# Patient Record
Sex: FEMALE | Race: WHITE | NOT HISPANIC OR LATINO | Employment: OTHER | URBAN - METROPOLITAN AREA
[De-identification: names, ages, dates, MRNs, and addresses within clinical notes are randomized per-mention and may not be internally consistent; named-entity substitution may affect disease eponyms.]

---

## 2023-04-07 LAB
BLOOD CULTURE: NORMAL
BLOOD CULTURE: NORMAL

## 2023-06-29 PROCEDURE — 99233 SBSQ HOSP IP/OBS HIGH 50: CPT | Performed by: INTERNAL MEDICINE

## 2023-06-30 PROCEDURE — 99233 SBSQ HOSP IP/OBS HIGH 50: CPT | Performed by: INTERNAL MEDICINE

## 2023-07-30 LAB
BLOOD CULTURE: NORMAL
BLOOD CULTURE: NORMAL

## 2023-09-30 ENCOUNTER — PHARMACY VISIT (OUTPATIENT)
Dept: PHARMACY | Facility: CLINIC | Age: 48
End: 2023-09-30

## 2023-11-09 PROBLEM — I25.10 CORONARY ARTERY DISEASE: Status: ACTIVE | Noted: 2023-11-09

## 2023-11-09 PROBLEM — J90 PLEURAL EFFUSION: Status: ACTIVE | Noted: 2023-11-09

## 2023-11-09 PROBLEM — F41.8 DEPRESSION WITH ANXIETY: Status: ACTIVE | Noted: 2023-11-09

## 2023-11-09 PROBLEM — R56.9 SEIZURE (MULTI): Status: ACTIVE | Noted: 2023-11-09

## 2023-11-09 PROBLEM — I72.9 PSEUDOANEURYSM (CMS-HCC): Status: ACTIVE | Noted: 2023-11-09

## 2023-11-09 PROBLEM — N28.9 ABNORMAL RENAL FUNCTION: Status: ACTIVE | Noted: 2023-11-09

## 2023-11-09 PROBLEM — I10 BENIGN ESSENTIAL HYPERTENSION: Status: ACTIVE | Noted: 2023-11-09

## 2023-11-09 PROBLEM — I38 ENDOCARDITIS: Status: ACTIVE | Noted: 2023-11-09

## 2023-11-09 PROBLEM — N18.6 END-STAGE RENAL DISEASE ON HEMODIALYSIS (MULTI): Status: ACTIVE | Noted: 2023-11-09

## 2023-11-09 PROBLEM — A41.9 SEPSIS (MULTI): Status: ACTIVE | Noted: 2023-11-09

## 2023-11-09 PROBLEM — K92.1 MELENA: Status: ACTIVE | Noted: 2023-11-09

## 2023-11-09 PROBLEM — D63.1 ANEMIA SECONDARY TO RENAL FAILURE: Status: ACTIVE | Noted: 2023-11-09

## 2023-11-09 PROBLEM — D63.8 ANEMIA OF CHRONIC DISEASE: Status: ACTIVE | Noted: 2023-11-09

## 2023-11-09 PROBLEM — I95.9 LOW BLOOD PRESSURE: Status: ACTIVE | Noted: 2023-11-09

## 2023-11-09 PROBLEM — E87.1 HYPONATREMIA: Status: ACTIVE | Noted: 2023-11-09

## 2023-11-09 PROBLEM — F41.9 ANXIETY: Status: ACTIVE | Noted: 2023-11-09

## 2023-11-09 PROBLEM — I10 HYPERTENSION: Status: ACTIVE | Noted: 2023-11-09

## 2023-11-09 PROBLEM — N18.9 ANEMIA SECONDARY TO RENAL FAILURE: Status: ACTIVE | Noted: 2023-11-09

## 2023-11-09 PROBLEM — E87.8 ELECTROLYTE IMBALANCE: Status: ACTIVE | Noted: 2023-11-09

## 2023-11-09 PROBLEM — Z95.2 S/P AORTIC VALVE REPLACEMENT: Status: ACTIVE | Noted: 2023-11-09

## 2023-11-09 PROBLEM — Z95.1 HISTORY OF CORONARY ARTERY BYPASS SURGERY: Status: ACTIVE | Noted: 2023-11-09

## 2023-11-09 PROBLEM — E87.5 HYPERKALEMIA: Status: ACTIVE | Noted: 2023-11-09

## 2023-11-09 PROBLEM — Z95.2 PRESENCE OF PROSTHETIC HEART VALVE: Status: ACTIVE | Noted: 2023-11-09

## 2023-11-09 PROBLEM — I25.810 ARTERIOSCLEROSIS OF CORONARY ARTERY BYPASS GRAFT: Status: ACTIVE | Noted: 2023-11-09

## 2023-11-09 PROBLEM — Z99.2 DEPENDENCE ON RENAL DIALYSIS (CMS-HCC): Status: ACTIVE | Noted: 2023-11-09

## 2023-11-09 PROBLEM — Z95.0 CARDIAC PACEMAKER IN SITU: Status: ACTIVE | Noted: 2023-11-09

## 2023-11-09 PROBLEM — R53.1 ASTHENIA: Status: ACTIVE | Noted: 2023-11-09

## 2023-11-09 PROBLEM — E83.51 HYPOCALCEMIA: Status: ACTIVE | Noted: 2023-11-09

## 2023-11-09 PROBLEM — Z99.2 END-STAGE RENAL DISEASE ON HEMODIALYSIS (MULTI): Status: ACTIVE | Noted: 2023-11-09

## 2023-11-09 PROBLEM — N18.6 END STAGE RENAL DISEASE (MULTI): Status: ACTIVE | Noted: 2023-11-09

## 2023-11-09 PROBLEM — D50.9 IRON DEFICIENCY ANEMIA: Status: ACTIVE | Noted: 2023-11-09

## 2023-11-09 PROBLEM — F33.3 MAJOR DEPRESSIVE DISORDER, RECURRENT, SEVERE WITH PSYCHOTIC SYMPTOMS (MULTI): Status: ACTIVE | Noted: 2023-11-09

## 2023-11-09 PROBLEM — I77.0 ARTERIOVENOUS FISTULA (CMS-HCC): Status: ACTIVE | Noted: 2023-11-09

## 2023-11-09 PROBLEM — G47.00 INSOMNIA: Status: ACTIVE | Noted: 2023-11-09

## 2023-11-09 PROBLEM — A49.02 MRSA (METHICILLIN RESISTANT STAPHYLOCOCCUS AUREUS) INFECTION: Status: ACTIVE | Noted: 2023-11-09

## 2023-11-09 PROBLEM — Z95.2 S/P MVR (MITRAL VALVE REPLACEMENT): Status: ACTIVE | Noted: 2023-11-09

## 2023-11-09 PROBLEM — I48.0 PAROXYSMAL ATRIAL FIBRILLATION (MULTI): Status: ACTIVE | Noted: 2023-11-09

## 2023-11-09 RX ORDER — ERGOCALCIFEROL 1.25 MG/1
1 CAPSULE ORAL
COMMUNITY
Start: 2023-08-08

## 2023-11-09 RX ORDER — PREGABALIN 25 MG/1
CAPSULE ORAL 2 TIMES DAILY
COMMUNITY
Start: 2023-05-03 | End: 2024-04-06 | Stop reason: HOSPADM

## 2023-11-09 RX ORDER — CLONIDINE HYDROCHLORIDE 0.3 MG/1
0.1 TABLET ORAL
COMMUNITY
End: 2024-01-15 | Stop reason: HOSPADM

## 2023-11-09 RX ORDER — ESZOPICLONE 1 MG/1
1 TABLET, FILM COATED ORAL NIGHTLY
COMMUNITY
End: 2024-01-15 | Stop reason: HOSPADM

## 2023-11-09 RX ORDER — CALCITRIOL 0.25 UG/1
0.5 CAPSULE ORAL DAILY
COMMUNITY
Start: 2023-04-26

## 2023-11-09 RX ORDER — ASPIRIN 81 MG/1
81 TABLET ORAL DAILY
COMMUNITY
Start: 2023-01-10 | End: 2024-01-08 | Stop reason: ALTCHOICE

## 2023-11-09 RX ORDER — OXYCODONE AND ACETAMINOPHEN 5; 325 MG/1; MG/1
1 TABLET ORAL 3 TIMES DAILY
COMMUNITY
Start: 2023-10-20 | End: 2023-11-19

## 2023-11-09 RX ORDER — HYDRALAZINE HYDROCHLORIDE 50 MG/1
50 TABLET, FILM COATED ORAL 3 TIMES DAILY
COMMUNITY

## 2023-11-09 RX ORDER — CARVEDILOL 12.5 MG/1
12.5 TABLET ORAL 2 TIMES DAILY
COMMUNITY
Start: 2023-01-10 | End: 2024-01-08 | Stop reason: SDUPTHER

## 2023-11-09 RX ORDER — ATORVASTATIN CALCIUM 40 MG/1
40 TABLET, FILM COATED ORAL DAILY
COMMUNITY
Start: 2023-01-10

## 2023-11-09 RX ORDER — BUPROPION HYDROCHLORIDE 100 MG/1
100 TABLET ORAL EVERY OTHER DAY
COMMUNITY
Start: 2023-04-26 | End: 2024-01-08 | Stop reason: ALTCHOICE

## 2023-11-09 RX ORDER — LEVETIRACETAM 500 MG/1
750 TABLET ORAL NIGHTLY
COMMUNITY

## 2023-11-09 RX ORDER — PROMETHAZINE HYDROCHLORIDE 25 MG/1
25 TABLET ORAL DAILY PRN
COMMUNITY
Start: 2023-08-30

## 2024-01-08 ENCOUNTER — APPOINTMENT (OUTPATIENT)
Dept: CARDIOLOGY | Facility: HOSPITAL | Age: 49
DRG: 698 | End: 2024-01-08
Payer: MEDICARE

## 2024-01-08 ENCOUNTER — APPOINTMENT (OUTPATIENT)
Dept: DIALYSIS | Facility: HOSPITAL | Age: 49
End: 2024-01-08
Payer: MEDICARE

## 2024-01-08 ENCOUNTER — HOSPITAL ENCOUNTER (INPATIENT)
Facility: HOSPITAL | Age: 49
LOS: 5 days | Discharge: HOME | DRG: 698 | End: 2024-01-15
Attending: EMERGENCY MEDICINE | Admitting: HOSPITALIST
Payer: MEDICARE

## 2024-01-08 ENCOUNTER — APPOINTMENT (OUTPATIENT)
Dept: RADIOLOGY | Facility: HOSPITAL | Age: 49
DRG: 698 | End: 2024-01-08
Payer: MEDICARE

## 2024-01-08 DIAGNOSIS — Z99.2 END-STAGE RENAL DISEASE ON HEMODIALYSIS (MULTI): ICD-10-CM

## 2024-01-08 DIAGNOSIS — D64.9 ANEMIA, UNSPECIFIED TYPE: ICD-10-CM

## 2024-01-08 DIAGNOSIS — I33.0 BACTERIAL ENDOCARDITIS, UNSPECIFIED CHRONICITY (HHS-HCC): ICD-10-CM

## 2024-01-08 DIAGNOSIS — B95.62 MRSA BACTEREMIA: Primary | ICD-10-CM

## 2024-01-08 DIAGNOSIS — Z99.2 DEPENDENCE ON RENAL DIALYSIS (CMS-HCC): ICD-10-CM

## 2024-01-08 DIAGNOSIS — I10 HYPERTENSION, UNSPECIFIED TYPE: ICD-10-CM

## 2024-01-08 DIAGNOSIS — B33.21: ICD-10-CM

## 2024-01-08 DIAGNOSIS — R78.81 MRSA BACTEREMIA: Primary | ICD-10-CM

## 2024-01-08 DIAGNOSIS — N18.6 END-STAGE RENAL DISEASE ON HEMODIALYSIS (MULTI): ICD-10-CM

## 2024-01-08 DIAGNOSIS — E87.5 HYPERKALEMIA: ICD-10-CM

## 2024-01-08 DIAGNOSIS — Z95.2 S/P MVR (MITRAL VALVE REPLACEMENT): ICD-10-CM

## 2024-01-08 DIAGNOSIS — Z95.2 S/P AORTIC VALVE REPLACEMENT: ICD-10-CM

## 2024-01-08 LAB
ALBUMIN SERPL-MCNC: 4 G/DL (ref 3.5–5)
ALP BLD-CCNC: 70 U/L (ref 35–125)
ALT SERPL-CCNC: 6 U/L (ref 5–40)
ANION GAP SERPL CALC-SCNC: >19 MMOL/L
ANION GAP SERPL CALC-SCNC: >19 MMOL/L
AST SERPL-CCNC: 14 U/L (ref 5–40)
ATRIAL RATE: 69 BPM
BASOPHILS # BLD AUTO: 0.04 X10*3/UL (ref 0–0.1)
BASOPHILS NFR BLD AUTO: 0.5 %
BILIRUB SERPL-MCNC: 0.3 MG/DL (ref 0.1–1.2)
BUN SERPL-MCNC: 56 MG/DL (ref 8–25)
BUN SERPL-MCNC: 59 MG/DL (ref 8–25)
CALCIUM SERPL-MCNC: 7 MG/DL (ref 8.5–10.4)
CALCIUM SERPL-MCNC: 7.2 MG/DL (ref 8.5–10.4)
CHLORIDE SERPL-SCNC: 88 MMOL/L (ref 97–107)
CHLORIDE SERPL-SCNC: 92 MMOL/L (ref 97–107)
CO2 SERPL-SCNC: 21 MMOL/L (ref 24–31)
CO2 SERPL-SCNC: 21 MMOL/L (ref 24–31)
CREAT SERPL-MCNC: 10.3 MG/DL (ref 0.4–1.6)
CREAT SERPL-MCNC: 10.7 MG/DL (ref 0.4–1.6)
EGFRCR SERPLBLD CKD-EPI 2021: 4 ML/MIN/1.73M*2
EOSINOPHIL # BLD AUTO: 0.32 X10*3/UL (ref 0–0.7)
EOSINOPHIL NFR BLD AUTO: 4.4 %
ERYTHROCYTE [DISTWIDTH] IN BLOOD BY AUTOMATED COUNT: 17.3 % (ref 11.5–14.5)
GFR SERPL CREATININE-BSD FRML MDRD: 4 ML/MIN/1.73M*2
GLUCOSE BLD MANUAL STRIP-MCNC: 71 MG/DL (ref 74–99)
GLUCOSE SERPL-MCNC: 101 MG/DL (ref 65–99)
GLUCOSE SERPL-MCNC: 26 MG/DL (ref 65–99)
HCT VFR BLD AUTO: 26.2 % (ref 36–46)
HGB BLD-MCNC: 7.7 G/DL (ref 12–16)
IMM GRANULOCYTES # BLD AUTO: 0.01 X10*3/UL (ref 0–0.7)
IMM GRANULOCYTES NFR BLD AUTO: 0.1 % (ref 0–0.9)
LYMPHOCYTES # BLD AUTO: 0.84 X10*3/UL (ref 1.2–4.8)
LYMPHOCYTES NFR BLD AUTO: 11.4 %
MCH RBC QN AUTO: 23.5 PG (ref 26–34)
MCHC RBC AUTO-ENTMCNC: 29.4 G/DL (ref 32–36)
MCV RBC AUTO: 80 FL (ref 80–100)
MONOCYTES # BLD AUTO: 0.42 X10*3/UL (ref 0.1–1)
MONOCYTES NFR BLD AUTO: 5.7 %
NEUTROPHILS # BLD AUTO: 5.71 X10*3/UL (ref 1.2–7.7)
NEUTROPHILS NFR BLD AUTO: 77.9 %
NRBC BLD-RTO: 0 /100 WBCS (ref 0–0)
P AXIS: 68 DEGREES
P OFFSET: 186 MS
P ONSET: 135 MS
PLATELET # BLD AUTO: 240 X10*3/UL (ref 150–450)
POTASSIUM SERPL-SCNC: 5 MMOL/L (ref 3.4–5.1)
POTASSIUM SERPL-SCNC: 6.9 MMOL/L (ref 3.4–5.1)
PR INTERVAL: 176 MS
PROT SERPL-MCNC: 8.2 G/DL (ref 5.9–7.9)
Q ONSET: 223 MS
QRS COUNT: 11 BEATS
QRS DURATION: 72 MS
QT INTERVAL: 418 MS
QTC CALCULATION(BAZETT): 447 MS
QTC FREDERICIA: 438 MS
R AXIS: 16 DEGREES
RBC # BLD AUTO: 3.28 X10*6/UL (ref 4–5.2)
SARS-COV-2 RNA RESP QL NAA+PROBE: NOT DETECTED
SODIUM SERPL-SCNC: 133 MMOL/L (ref 133–145)
SODIUM SERPL-SCNC: 136 MMOL/L (ref 133–145)
T AXIS: 80 DEGREES
T OFFSET: 432 MS
VENTRICULAR RATE: 69 BPM
WBC # BLD AUTO: 7.3 X10*3/UL (ref 4.4–11.3)

## 2024-01-08 PROCEDURE — 71046 X-RAY EXAM CHEST 2 VIEWS: CPT | Mod: FOREIGN READ | Performed by: RADIOLOGY

## 2024-01-08 PROCEDURE — 96366 THER/PROPH/DIAG IV INF ADDON: CPT

## 2024-01-08 PROCEDURE — 36415 COLL VENOUS BLD VENIPUNCTURE: CPT | Performed by: EMERGENCY MEDICINE

## 2024-01-08 PROCEDURE — 96365 THER/PROPH/DIAG IV INF INIT: CPT

## 2024-01-08 PROCEDURE — 87040 BLOOD CULTURE FOR BACTERIA: CPT | Mod: WESLAB | Performed by: HOSPITALIST

## 2024-01-08 PROCEDURE — 2500000001 HC RX 250 WO HCPCS SELF ADMINISTERED DRUGS (ALT 637 FOR MEDICARE OP): Performed by: HOSPITALIST

## 2024-01-08 PROCEDURE — 99285 EMERGENCY DEPT VISIT HI MDM: CPT | Performed by: EMERGENCY MEDICINE

## 2024-01-08 PROCEDURE — 96375 TX/PRO/DX INJ NEW DRUG ADDON: CPT

## 2024-01-08 PROCEDURE — 82947 ASSAY GLUCOSE BLOOD QUANT: CPT

## 2024-01-08 PROCEDURE — 36415 COLL VENOUS BLD VENIPUNCTURE: CPT | Performed by: HOSPITALIST

## 2024-01-08 PROCEDURE — 8010000001 HC DIALYSIS - HEMODIALYSIS PER DAY

## 2024-01-08 PROCEDURE — 2500000004 HC RX 250 GENERAL PHARMACY W/ HCPCS (ALT 636 FOR OP/ED)

## 2024-01-08 PROCEDURE — 2500000004 HC RX 250 GENERAL PHARMACY W/ HCPCS (ALT 636 FOR OP/ED): Performed by: INTERNAL MEDICINE

## 2024-01-08 PROCEDURE — 2500000004 HC RX 250 GENERAL PHARMACY W/ HCPCS (ALT 636 FOR OP/ED): Performed by: HOSPITALIST

## 2024-01-08 PROCEDURE — 93005 ELECTROCARDIOGRAM TRACING: CPT

## 2024-01-08 PROCEDURE — G0378 HOSPITAL OBSERVATION PER HR: HCPCS

## 2024-01-08 PROCEDURE — 2500000004 HC RX 250 GENERAL PHARMACY W/ HCPCS (ALT 636 FOR OP/ED): Performed by: EMERGENCY MEDICINE

## 2024-01-08 PROCEDURE — 5A1D70Z PERFORMANCE OF URINARY FILTRATION, INTERMITTENT, LESS THAN 6 HOURS PER DAY: ICD-10-PCS | Performed by: INTERNAL MEDICINE

## 2024-01-08 PROCEDURE — 85025 COMPLETE CBC W/AUTO DIFF WBC: CPT | Performed by: EMERGENCY MEDICINE

## 2024-01-08 PROCEDURE — 99223 1ST HOSP IP/OBS HIGH 75: CPT | Performed by: NURSE PRACTITIONER

## 2024-01-08 PROCEDURE — 87635 SARS-COV-2 COVID-19 AMP PRB: CPT | Performed by: EMERGENCY MEDICINE

## 2024-01-08 PROCEDURE — 80053 COMPREHEN METABOLIC PANEL: CPT | Performed by: EMERGENCY MEDICINE

## 2024-01-08 PROCEDURE — 2500000002 HC RX 250 W HCPCS SELF ADMINISTERED DRUGS (ALT 637 FOR MEDICARE OP, ALT 636 FOR OP/ED)

## 2024-01-08 PROCEDURE — 2500000005 HC RX 250 GENERAL PHARMACY W/O HCPCS: Performed by: EMERGENCY MEDICINE

## 2024-01-08 PROCEDURE — 71046 X-RAY EXAM CHEST 2 VIEWS: CPT | Mod: FR

## 2024-01-08 PROCEDURE — 96376 TX/PRO/DX INJ SAME DRUG ADON: CPT

## 2024-01-08 PROCEDURE — 84520 ASSAY OF UREA NITROGEN: CPT | Performed by: HOSPITALIST

## 2024-01-08 RX ORDER — CALCITRIOL 0.25 UG/1
0.5 CAPSULE ORAL DAILY
Status: DISCONTINUED | OUTPATIENT
Start: 2024-01-08 | End: 2024-01-15 | Stop reason: HOSPADM

## 2024-01-08 RX ORDER — OXYCODONE AND ACETAMINOPHEN 5; 325 MG/1; MG/1
1 TABLET ORAL EVERY 6 HOURS PRN
Status: DISCONTINUED | OUTPATIENT
Start: 2024-01-08 | End: 2024-01-15 | Stop reason: HOSPADM

## 2024-01-08 RX ORDER — METOPROLOL SUCCINATE 50 MG/1
50 TABLET, EXTENDED RELEASE ORAL 2 TIMES DAILY
Status: DISCONTINUED | OUTPATIENT
Start: 2024-01-08 | End: 2024-01-15 | Stop reason: HOSPADM

## 2024-01-08 RX ORDER — BUPROPION HYDROCHLORIDE 100 MG/1
100 TABLET ORAL EVERY OTHER DAY
Status: DISCONTINUED | OUTPATIENT
Start: 2024-01-09 | End: 2024-01-15 | Stop reason: HOSPADM

## 2024-01-08 RX ORDER — HYDRALAZINE HYDROCHLORIDE 50 MG/1
50 TABLET, FILM COATED ORAL 3 TIMES DAILY
Status: DISCONTINUED | OUTPATIENT
Start: 2024-01-08 | End: 2024-01-15 | Stop reason: HOSPADM

## 2024-01-08 RX ORDER — HEPARIN SODIUM 1000 [USP'U]/ML
1000 INJECTION, SOLUTION INTRAVENOUS; SUBCUTANEOUS
Status: DISCONTINUED | OUTPATIENT
Start: 2024-01-08 | End: 2024-01-15 | Stop reason: ALTCHOICE

## 2024-01-08 RX ORDER — HEPARIN SODIUM 5000 [USP'U]/ML
5000 INJECTION, SOLUTION INTRAVENOUS; SUBCUTANEOUS EVERY 8 HOURS SCHEDULED
Status: DISCONTINUED | OUTPATIENT
Start: 2024-01-08 | End: 2024-01-15 | Stop reason: HOSPADM

## 2024-01-08 RX ORDER — DIPHENHYDRAMINE HYDROCHLORIDE 50 MG/ML
50 INJECTION INTRAMUSCULAR; INTRAVENOUS
Status: COMPLETED | OUTPATIENT
Start: 2024-01-08 | End: 2024-01-08

## 2024-01-08 RX ORDER — CALCIUM GLUCONATE 20 MG/ML
1 INJECTION, SOLUTION INTRAVENOUS ONCE
Status: COMPLETED | OUTPATIENT
Start: 2024-01-08 | End: 2024-01-08

## 2024-01-08 RX ORDER — MORPHINE SULFATE 2 MG/ML
2 INJECTION, SOLUTION INTRAMUSCULAR; INTRAVENOUS EVERY 4 HOURS PRN
Status: DISCONTINUED | OUTPATIENT
Start: 2024-01-08 | End: 2024-01-15 | Stop reason: HOSPADM

## 2024-01-08 RX ORDER — ATORVASTATIN CALCIUM 40 MG/1
40 TABLET, FILM COATED ORAL NIGHTLY
Status: DISCONTINUED | OUTPATIENT
Start: 2024-01-08 | End: 2024-01-15 | Stop reason: HOSPADM

## 2024-01-08 RX ORDER — CALCIUM GLUCONATE 98 MG/ML
1 INJECTION, SOLUTION INTRAVENOUS ONCE
Status: DISCONTINUED | OUTPATIENT
Start: 2024-01-08 | End: 2024-01-08

## 2024-01-08 RX ORDER — PREGABALIN 25 MG/1
25 CAPSULE ORAL 2 TIMES DAILY
Status: DISCONTINUED | OUTPATIENT
Start: 2024-01-08 | End: 2024-01-15 | Stop reason: HOSPADM

## 2024-01-08 RX ORDER — CARVEDILOL 12.5 MG/1
12.5 TABLET ORAL 2 TIMES DAILY
Status: DISCONTINUED | OUTPATIENT
Start: 2024-01-08 | End: 2024-01-08

## 2024-01-08 RX ORDER — DEXTROSE 50 % IN WATER (D50W) INTRAVENOUS SYRINGE
25 ONCE
Status: COMPLETED | OUTPATIENT
Start: 2024-01-08 | End: 2024-01-08

## 2024-01-08 RX ORDER — MORPHINE SULFATE 2 MG/ML
2 INJECTION, SOLUTION INTRAMUSCULAR; INTRAVENOUS ONCE
Status: COMPLETED | OUTPATIENT
Start: 2024-01-08 | End: 2024-01-08

## 2024-01-08 RX ORDER — HYDROCODONE BITARTRATE AND ACETAMINOPHEN 5; 325 MG/1; MG/1
1 TABLET ORAL ONCE
Status: DISCONTINUED | OUTPATIENT
Start: 2024-01-08 | End: 2024-01-08

## 2024-01-08 RX ORDER — ASPIRIN 81 MG/1
81 TABLET ORAL DAILY
Status: DISCONTINUED | OUTPATIENT
Start: 2024-01-08 | End: 2024-01-15 | Stop reason: HOSPADM

## 2024-01-08 RX ORDER — ACETAMINOPHEN 325 MG/1
650 TABLET ORAL EVERY 6 HOURS PRN
Status: DISCONTINUED | OUTPATIENT
Start: 2024-01-08 | End: 2024-01-15 | Stop reason: HOSPADM

## 2024-01-08 RX ORDER — CLONIDINE HYDROCHLORIDE 0.1 MG/1
0.1 TABLET ORAL EVERY 8 HOURS SCHEDULED
Status: DISCONTINUED | OUTPATIENT
Start: 2024-01-08 | End: 2024-01-15 | Stop reason: HOSPADM

## 2024-01-08 RX ORDER — ACETAMINOPHEN 325 MG/1
650 TABLET ORAL ONCE
Status: COMPLETED | OUTPATIENT
Start: 2024-01-08 | End: 2024-01-08

## 2024-01-08 RX ORDER — LEVETIRACETAM 500 MG/1
500 TABLET ORAL NIGHTLY
Status: DISCONTINUED | OUTPATIENT
Start: 2024-01-08 | End: 2024-01-15 | Stop reason: HOSPADM

## 2024-01-08 RX ADMIN — INSULIN HUMAN 10 UNITS: 100 INJECTION, SOLUTION PARENTERAL at 08:39

## 2024-01-08 RX ADMIN — MORPHINE SULFATE 2 MG: 2 INJECTION, SOLUTION INTRAMUSCULAR; INTRAVENOUS at 10:34

## 2024-01-08 RX ADMIN — HEPARIN SODIUM 2100 UNITS: 1000 INJECTION INTRAVENOUS; SUBCUTANEOUS at 17:01

## 2024-01-08 RX ADMIN — METOPROLOL SUCCINATE 50 MG: 50 TABLET, EXTENDED RELEASE ORAL at 22:21

## 2024-01-08 RX ADMIN — LEVETIRACETAM 500 MG: 500 TABLET, FILM COATED ORAL at 22:21

## 2024-01-08 RX ADMIN — PREGABALIN 25 MG: 25 CAPSULE ORAL at 22:22

## 2024-01-08 RX ADMIN — DEXTROSE MONOHYDRATE 25 ML: 25 INJECTION, SOLUTION INTRAVENOUS at 08:38

## 2024-01-08 RX ADMIN — HYDRALAZINE HYDROCHLORIDE 50 MG: 50 TABLET ORAL at 18:06

## 2024-01-08 RX ADMIN — METOPROLOL SUCCINATE 50 MG: 50 TABLET, EXTENDED RELEASE ORAL at 11:42

## 2024-01-08 RX ADMIN — HYDRALAZINE HYDROCHLORIDE 50 MG: 50 TABLET ORAL at 11:42

## 2024-01-08 RX ADMIN — MORPHINE SULFATE 2 MG: 2 INJECTION, SOLUTION INTRAMUSCULAR; INTRAVENOUS at 22:21

## 2024-01-08 RX ADMIN — PROMETHAZINE HYDROCHLORIDE 12.5 MG: 50 INJECTION, SOLUTION INTRAMUSCULAR; INTRAVENOUS at 21:52

## 2024-01-08 RX ADMIN — CALCITRIOL CAPSULES 0.25 MCG 0.5 MCG: 0.25 CAPSULE ORAL at 12:24

## 2024-01-08 RX ADMIN — CALCIUM GLUCONATE 1 G: 20 INJECTION, SOLUTION INTRAVENOUS at 08:38

## 2024-01-08 RX ADMIN — ACETAMINOPHEN 650 MG: 325 TABLET ORAL at 07:11

## 2024-01-08 RX ADMIN — DIPHENHYDRAMINE HYDROCHLORIDE 50 MG: 50 INJECTION, SOLUTION INTRAMUSCULAR; INTRAVENOUS at 15:14

## 2024-01-08 RX ADMIN — CLONIDINE HYDROCHLORIDE 0.1 MG: 0.1 TABLET ORAL at 18:06

## 2024-01-08 RX ADMIN — ASPIRIN 81 MG: 81 TABLET, COATED ORAL at 11:42

## 2024-01-08 RX ADMIN — CLONIDINE HYDROCHLORIDE 0.1 MG: 0.1 TABLET ORAL at 22:22

## 2024-01-08 RX ADMIN — MORPHINE SULFATE 2 MG: 2 INJECTION, SOLUTION INTRAMUSCULAR; INTRAVENOUS at 18:06

## 2024-01-08 SDOH — ECONOMIC STABILITY: FOOD INSECURITY: WITHIN THE PAST 12 MONTHS, THE FOOD YOU BOUGHT JUST DIDN'T LAST AND YOU DIDN'T HAVE MONEY TO GET MORE.: NEVER TRUE

## 2024-01-08 SDOH — SOCIAL STABILITY: SOCIAL NETWORK: ARE YOU MARRIED, WIDOWED, DIVORCED, SEPARATED, NEVER MARRIED, OR LIVING WITH A PARTNER?: DIVORCED

## 2024-01-08 SDOH — SOCIAL STABILITY: SOCIAL INSECURITY: DO YOU FEEL UNSAFE GOING BACK TO THE PLACE WHERE YOU ARE LIVING?: NO

## 2024-01-08 SDOH — SOCIAL STABILITY: SOCIAL INSECURITY: ABUSE: ADULT

## 2024-01-08 SDOH — SOCIAL STABILITY: SOCIAL INSECURITY
WITHIN THE LAST YEAR, HAVE TO BEEN RAPED OR FORCED TO HAVE ANY KIND OF SEXUAL ACTIVITY BY YOUR PARTNER OR EX-PARTNER?: NO

## 2024-01-08 SDOH — SOCIAL STABILITY: SOCIAL INSECURITY: DO YOU FEEL ANYONE HAS EXPLOITED OR TAKEN ADVANTAGE OF YOU FINANCIALLY OR OF YOUR PERSONAL PROPERTY?: NO

## 2024-01-08 SDOH — SOCIAL STABILITY: SOCIAL INSECURITY: WITHIN THE LAST YEAR, HAVE YOU BEEN HUMILIATED OR EMOTIONALLY ABUSED IN OTHER WAYS BY YOUR PARTNER OR EX-PARTNER?: NO

## 2024-01-08 SDOH — SOCIAL STABILITY: SOCIAL INSECURITY
WITHIN THE LAST YEAR, HAVE YOU BEEN KICKED, HIT, SLAPPED, OR OTHERWISE PHYSICALLY HURT BY YOUR PARTNER OR EX-PARTNER?: NO

## 2024-01-08 SDOH — SOCIAL STABILITY: SOCIAL INSECURITY: WITHIN THE LAST YEAR, HAVE YOU BEEN AFRAID OF YOUR PARTNER OR EX-PARTNER?: NO

## 2024-01-08 SDOH — HEALTH STABILITY: MENTAL HEALTH
STRESS IS WHEN SOMEONE FEELS TENSE, NERVOUS, ANXIOUS, OR CAN'T SLEEP AT NIGHT BECAUSE THEIR MIND IS TROUBLED. HOW STRESSED ARE YOU?: NOT AT ALL

## 2024-01-08 SDOH — SOCIAL STABILITY: SOCIAL INSECURITY: HAS ANYONE EVER THREATENED TO HURT YOUR FAMILY OR YOUR PETS?: NO

## 2024-01-08 SDOH — SOCIAL STABILITY: SOCIAL INSECURITY: WERE YOU ABLE TO COMPLETE ALL THE BEHAVIORAL HEALTH SCREENINGS?: YES

## 2024-01-08 SDOH — ECONOMIC STABILITY: INCOME INSECURITY: IN THE PAST 12 MONTHS, HAS THE ELECTRIC, GAS, OIL, OR WATER COMPANY THREATENED TO SHUT OFF SERVICE IN YOUR HOME?: NO

## 2024-01-08 SDOH — SOCIAL STABILITY: SOCIAL NETWORK: HOW OFTEN DO YOU ATTENT MEETINGS OF THE CLUB OR ORGANIZATION YOU BELONG TO?: NEVER

## 2024-01-08 SDOH — SOCIAL STABILITY: SOCIAL NETWORK: HOW OFTEN DO YOU GET TOGETHER WITH FRIENDS OR RELATIVES?: MORE THAN THREE TIMES A WEEK

## 2024-01-08 SDOH — SOCIAL STABILITY: SOCIAL NETWORK
DO YOU BELONG TO ANY CLUBS OR ORGANIZATIONS SUCH AS CHURCH GROUPS UNIONS, FRATERNAL OR ATHLETIC GROUPS, OR SCHOOL GROUPS?: NO

## 2024-01-08 SDOH — SOCIAL STABILITY: SOCIAL NETWORK: HOW OFTEN DO YOU ATTEND CHURCH OR RELIGIOUS SERVICES?: MORE THAN 4 TIMES PER YEAR

## 2024-01-08 SDOH — SOCIAL STABILITY: SOCIAL INSECURITY: DOES ANYONE TRY TO KEEP YOU FROM HAVING/CONTACTING OTHER FRIENDS OR DOING THINGS OUTSIDE YOUR HOME?: NO

## 2024-01-08 SDOH — ECONOMIC STABILITY: FOOD INSECURITY: WITHIN THE PAST 12 MONTHS, YOU WORRIED THAT YOUR FOOD WOULD RUN OUT BEFORE YOU GOT MONEY TO BUY MORE.: NEVER TRUE

## 2024-01-08 SDOH — SOCIAL STABILITY: SOCIAL NETWORK
IN A TYPICAL WEEK, HOW MANY TIMES DO YOU TALK ON THE PHONE WITH FAMILY, FRIENDS, OR NEIGHBORS?: MORE THAN THREE TIMES A WEEK

## 2024-01-08 SDOH — SOCIAL STABILITY: SOCIAL INSECURITY: ARE YOU OR HAVE YOU BEEN THREATENED OR ABUSED PHYSICALLY, EMOTIONALLY, OR SEXUALLY BY ANYONE?: NO

## 2024-01-08 SDOH — SOCIAL STABILITY: SOCIAL INSECURITY: ARE THERE ANY APPARENT SIGNS OF INJURIES/BEHAVIORS THAT COULD BE RELATED TO ABUSE/NEGLECT?: NO

## 2024-01-08 SDOH — SOCIAL STABILITY: SOCIAL INSECURITY: HAVE YOU HAD THOUGHTS OF HARMING ANYONE ELSE?: NO

## 2024-01-08 ASSESSMENT — PAIN SCALES - GENERAL
PAINLEVEL_OUTOF10: 8
PAINLEVEL_OUTOF10: 8
PAINLEVEL_OUTOF10: 9
PAINLEVEL_OUTOF10: 0 - NO PAIN

## 2024-01-08 ASSESSMENT — COGNITIVE AND FUNCTIONAL STATUS - GENERAL
DRESSING REGULAR LOWER BODY CLOTHING: TOTAL
PATIENT BASELINE BEDBOUND: NO
HELP NEEDED FOR BATHING: TOTAL
TOILETING: TOTAL
STANDING UP FROM CHAIR USING ARMS: TOTAL
MOVING FROM LYING ON BACK TO SITTING ON SIDE OF FLAT BED WITH BEDRAILS: TOTAL
PERSONAL GROOMING: TOTAL
WALKING IN HOSPITAL ROOM: TOTAL
EATING MEALS: TOTAL
TURNING FROM BACK TO SIDE WHILE IN FLAT BAD: TOTAL
MOBILITY SCORE: 6
CLIMB 3 TO 5 STEPS WITH RAILING: TOTAL
DAILY ACTIVITIY SCORE: 6
DRESSING REGULAR UPPER BODY CLOTHING: TOTAL
MOVING TO AND FROM BED TO CHAIR: TOTAL

## 2024-01-08 ASSESSMENT — ACTIVITIES OF DAILY LIVING (ADL)
HEARING - RIGHT EAR: FUNCTIONAL
ADEQUATE_TO_COMPLETE_ADL: YES
FEEDING YOURSELF: INDEPENDENT
WALKS IN HOME: INDEPENDENT
LACK_OF_TRANSPORTATION: NO
GROOMING: INDEPENDENT
HEARING - LEFT EAR: FUNCTIONAL
TOILETING: INDEPENDENT
JUDGMENT_ADEQUATE_SAFELY_COMPLETE_DAILY_ACTIVITIES: YES
PATIENT'S MEMORY ADEQUATE TO SAFELY COMPLETE DAILY ACTIVITIES?: YES
DRESSING YOURSELF: INDEPENDENT
BATHING: INDEPENDENT

## 2024-01-08 ASSESSMENT — COLUMBIA-SUICIDE SEVERITY RATING SCALE - C-SSRS
6. HAVE YOU EVER DONE ANYTHING, STARTED TO DO ANYTHING, OR PREPARED TO DO ANYTHING TO END YOUR LIFE?: NO
2. HAVE YOU ACTUALLY HAD ANY THOUGHTS OF KILLING YOURSELF?: NO
1. IN THE PAST MONTH, HAVE YOU WISHED YOU WERE DEAD OR WISHED YOU COULD GO TO SLEEP AND NOT WAKE UP?: NO
1. IN THE PAST MONTH, HAVE YOU WISHED YOU WERE DEAD OR WISHED YOU COULD GO TO SLEEP AND NOT WAKE UP?: NO
2. HAVE YOU ACTUALLY HAD ANY THOUGHTS OF KILLING YOURSELF?: NO
6. HAVE YOU EVER DONE ANYTHING, STARTED TO DO ANYTHING, OR PREPARED TO DO ANYTHING TO END YOUR LIFE?: NO

## 2024-01-08 ASSESSMENT — PAIN - FUNCTIONAL ASSESSMENT
PAIN_FUNCTIONAL_ASSESSMENT: 0-10

## 2024-01-08 ASSESSMENT — LIFESTYLE VARIABLES
HOW MANY STANDARD DRINKS CONTAINING ALCOHOL DO YOU HAVE ON A TYPICAL DAY: PATIENT DOES NOT DRINK
AUDIT-C TOTAL SCORE: 0
HOW OFTEN DO YOU HAVE A DRINK CONTAINING ALCOHOL: NEVER
HOW OFTEN DO YOU HAVE 6 OR MORE DRINKS ON ONE OCCASION: NEVER
PRESCIPTION_ABUSE_PAST_12_MONTHS: NO
SKIP TO QUESTIONS 9-10: 1
SUBSTANCE_ABUSE_PAST_12_MONTHS: NO
AUDIT-C TOTAL SCORE: 0

## 2024-01-08 ASSESSMENT — PAIN DESCRIPTION - ORIENTATION: ORIENTATION: LEFT

## 2024-01-08 ASSESSMENT — PAIN DESCRIPTION - LOCATION: LOCATION: OTHER (COMMENT)

## 2024-01-08 ASSESSMENT — PATIENT HEALTH QUESTIONNAIRE - PHQ9
SUM OF ALL RESPONSES TO PHQ9 QUESTIONS 1 & 2: 0
1. LITTLE INTEREST OR PLEASURE IN DOING THINGS: NOT AT ALL
2. FEELING DOWN, DEPRESSED OR HOPELESS: NOT AT ALL

## 2024-01-08 ASSESSMENT — PAIN DESCRIPTION - FREQUENCY: FREQUENCY: CONSTANT/CONTINUOUS

## 2024-01-08 ASSESSMENT — PAIN DESCRIPTION - ONSET: ONSET: AWAKENED FROM SLEEP

## 2024-01-08 ASSESSMENT — PAIN DESCRIPTION - PROGRESSION: CLINICAL_PROGRESSION: NOT CHANGED

## 2024-01-08 ASSESSMENT — PAIN DESCRIPTION - DESCRIPTORS: DESCRIPTORS: ACHING

## 2024-01-08 ASSESSMENT — PAIN DESCRIPTION - PAIN TYPE: TYPE: ACUTE PAIN

## 2024-01-08 NOTE — H&P
History Of Present Illness  Batsheva Page is a 48 y.o. female presenting with history of ESRD on HD, HTN, CAD, recurrent endocarditis who presents to ED with dislodged tunneled dialysis catheter. Patient says she did not know there was a problem with her dialysis catheter. Says that when she went to HD today (for her regularly scheduled MWF HD), the dialysis tech accessed her line, then said it was too 'loose' to use. She was disconnected and sent to ED.    Patient reports her chest is now hurting at the site of the catheter. She also complains of pains in her BL legs. No fevers/chills, SOB, n/v.      Past Medical History  She has a past medical history of Aortic valve replaced (2022), Chronic pain, ESRD (end stage renal disease) (CMS/[facility]), H/O mitral valve replacement (2013), Heart disease, Hypertension, Mitral valve regurgitation, and Seizures (CMS/[facility]).    Surgical History  She has a past surgical history that includes IR CVC tunneled (09/09/2022); IR CVC tunneled (12/28/2022); US guided percutaneous placement (07/14/2022); Parathyroidectomy; Coronary artery bypass graft; Mitral valve replacement (2013); Aortic valve replacement (2020); and Mitral valve replacement (2020).     Social History  She reports that she has never smoked. She has never used smokeless tobacco. No history on file for alcohol use and drug use.    Family History  No family history on file.     Allergies  Kayexalate, Metoclopramide hcl, Prochlorperazine, and Ondansetron    Review of Systems     General: no fatigue, no malaise, no fevers/chills   HENT: no rhinorrhea, no sore throat, no ear pain   Eyes: no change in vision, denies eye pain or discharge   Lungs: no SOB, no cough, no hemoptysis   CV: no chest pain, no palpitations, no leg edema   Abd: no nausea/vomiting, no constipation/diarrhea, no abdominal pain   : no dysuria, no frequency, no nocturia, no flank pain   Endocrine: no polydipsia/polyuria, no hot or cold intolerance    Neuro: no headaches, no syncope, no seizures   MSK: no back pain, no neck pain, no joint problems   Psych: no anxiety, no depression, no hallucinations    Physical Exam  General: alert, no diaphoresis   HENT: mucous membranes moist, external ears normal, no rhinorrhea   Eyes: no icterus or injection, no discharge   Lungs: CTA BL   Heart: RRR, no LE edema BL   GI: abdomen soft, nontender, nondistended, BS present   MSK: no joint effusion or deformity   Skin: scabs noted on BL legs and pitting scars noted on UE and LE   Neuro: grossly normal cognition, motor strength, sensation       Last Recorded Vitals  BP (!) 196/52   Pulse 83   Temp 36.8 °C (98.2 °F) (Tympanic)   Resp 18   Wt 65 kg (143 lb 4.8 oz)   SpO2 97%     Relevant Results      Results for orders placed or performed during the hospital encounter of 01/08/24 (from the past 24 hour(s))   CBC and Auto Differential   Result Value Ref Range    WBC 7.3 4.4 - 11.3 x10*3/uL    nRBC 0.0 0.0 - 0.0 /100 WBCs    RBC 3.28 (L) 4.00 - 5.20 x10*6/uL    Hemoglobin 7.7 (L) 12.0 - 16.0 g/dL    Hematocrit 26.2 (L) 36.0 - 46.0 %    MCV 80 80 - 100 fL    MCH 23.5 (L) 26.0 - 34.0 pg    MCHC 29.4 (L) 32.0 - 36.0 g/dL    RDW 17.3 (H) 11.5 - 14.5 %    Platelets 240 150 - 450 x10*3/uL    Neutrophils % 77.9 40.0 - 80.0 %    Immature Granulocytes %, Automated 0.1 0.0 - 0.9 %    Lymphocytes % 11.4 13.0 - 44.0 %    Monocytes % 5.7 2.0 - 10.0 %    Eosinophils % 4.4 0.0 - 6.0 %    Basophils % 0.5 0.0 - 2.0 %    Neutrophils Absolute 5.71 1.20 - 7.70 x10*3/uL    Immature Granulocytes Absolute, Automated 0.01 0.00 - 0.70 x10*3/uL    Lymphocytes Absolute 0.84 (L) 1.20 - 4.80 x10*3/uL    Monocytes Absolute 0.42 0.10 - 1.00 x10*3/uL    Eosinophils Absolute 0.32 0.00 - 0.70 x10*3/uL    Basophils Absolute 0.04 0.00 - 0.10 x10*3/uL   Comprehensive metabolic panel   Result Value Ref Range    Glucose 101 (H) 65 - 99 mg/dL    Sodium 133 133 - 145 mmol/L    Potassium 6.9 (HH) 3.4 - 5.1 mmol/L     Chloride 88 (L) 97 - 107 mmol/L    Bicarbonate 21 (L) 24 - 31 mmol/L    Urea Nitrogen 56 (H) 8 - 25 mg/dL    Creatinine 10.30 (H) 0.40 - 1.60 mg/dL    eGFR 4 (L) >60 mL/min/1.73m*2    Calcium 7.2 (L) 8.5 - 10.4 mg/dL    Albumin 4.0 3.5 - 5.0 g/dL    Alkaline Phosphatase 70 35 - 125 U/L    Total Protein 8.2 (H) 5.9 - 7.9 g/dL    AST 14 5 - 40 U/L    Bilirubin, Total 0.3 0.1 - 1.2 mg/dL    ALT 6 5 - 40 U/L    Anion Gap >19 (H) <=19 mmol/L   SARS-CoV-2 RT PCR   Result Value Ref Range    Coronavirus 2019, PCR Not Detected Not Detected   ECG 12 lead   Result Value Ref Range    Ventricular Rate 69 BPM    Atrial Rate 69 BPM    MN Interval 176 ms    QRS Duration 72 ms    QT Interval 418 ms    QTC Calculation(Bazett) 447 ms    P Axis 68 degrees    R Axis 16 degrees    T Axis 80 degrees    QRS Count 11 beats    Q Onset 223 ms    P Onset 135 ms    P Offset 186 ms    T Offset 432 ms    QTC Fredericia 438 ms              Assessment/Plan   Principal Problem:    Hyperkalemia    Hyperkalemia  ESRD  Dislodged permacath  - Vascular surgery consulted, they stabilized the line and said okay to use. Recommend IR placing new HD line  - discussed with Dr. Pedersen. Patient needs fairly urgent HD. Can use the line now per vascular and then place the new line    H/o endocarditis  - with bioprosthetic valves, recently abx stopped. Normally follows with Dr. Soto.   - will send a set of surveillance blood cultures now-- patient with no signs/symptoms of bacteremia/endocarditis at the moment  - if shows signs of sepsis or has positive blood cultures-- will start abx and consult Dr. Soto    HTN  - continue home meds    Depression  - continue buproprion    CAD  - statin, aspirin, beta blocker    Chronic pain  - continue patient's normal pain meds    DVT ppx    CCT: 35 min         Kinza Weiner DO

## 2024-01-08 NOTE — CONSULTS
Reason for Consult   Dialysis catheter displacement  History Of Present Illness    This is a 48-year-old female with past medical history of end-stage renal disease on dialysis, hypertension, MRSA bacteremia associated with infected vascular catheters and endocarditis who presented to the emergency department for displacement of left chest dialysis catheter.  Patient seen at bedside in the emergency department with Dr. Rogers.  She went to undergo dialysis this morning and the dialysis tech noticed that the catheter was displaced, loose and falling out.  They told her they were unable to dialyze due to concern of displacement.  Dr. Rogers evaluated the catheter which does not have any sutures in place, patient states they were removed by the dialysis tech due to pain.  She endorses left chest pain secondary to dialysis and overall generalized muscle aches.  Denies chest pain or shortness of breath  No nausea, vomiting fever or chills.  Dr. Rogers evaluated the catheter and stated it is okay for use with dialysis, recommends IR placed dialysis catheter with sedation.     Past Medical History  No past medical history on file.      Surgical History  Past Surgical History:   Procedure Laterality Date    IR CVC TUNNELED  9/9/2022    IR CVC TUNNELED 9/9/2022 [facility] INPATIENT LEGACY    IR CVC TUNNELED  12/28/2022    IR CVC TUNNELED 12/28/2022 [facility] INPATIENT LEGACY    US GUIDED PERCUTANEOUS PLACEMENT  7/14/2022    US GUIDED PERCUTANEOUS PLACEMENT [facility] EMERGENCY LEGACY          Social History  Social History     Socioeconomic History    Marital status:      Spouse name: Not on file    Number of children: Not on file    Years of education: Not on file    Highest education level: Not on file   Occupational History    Not on file   Tobacco Use    Smoking status: Not on file    Smokeless tobacco: Not on file   Substance and Sexual Activity    Alcohol use: Not on file    Drug use: Not on file    Sexual activity: Not on file   Other  "Topics Concern    Not on file   Social History Narrative    Not on file     Social Determinants of Health     Financial Resource Strain: Not on file   Food Insecurity: Not on file   Transportation Needs: Not on file   Physical Activity: Not on file   Stress: Not on file   Social Connections: Not on file   Intimate Partner Violence: Not on file   Housing Stability: Not on file         Family History  No family history on file.       Allergies  Allergies   Allergen Reactions    Kayexalate Seizure    Metoclopramide Hcl Other     anxious, agitated, \"skin crawl    Prochlorperazine Other     anxious, agitated, \"skin crawl    Ondansetron Other and Headache         Relevant Results  Results for orders placed or performed during the hospital encounter of 01/08/24 (from the past 24 hour(s))   CBC and Auto Differential   Result Value Ref Range    WBC 7.3 4.4 - 11.3 x10*3/uL    nRBC 0.0 0.0 - 0.0 /100 WBCs    RBC 3.28 (L) 4.00 - 5.20 x10*6/uL    Hemoglobin 7.7 (L) 12.0 - 16.0 g/dL    Hematocrit 26.2 (L) 36.0 - 46.0 %    MCV 80 80 - 100 fL    MCH 23.5 (L) 26.0 - 34.0 pg    MCHC 29.4 (L) 32.0 - 36.0 g/dL    RDW 17.3 (H) 11.5 - 14.5 %    Platelets 240 150 - 450 x10*3/uL    Neutrophils % 77.9 40.0 - 80.0 %    Immature Granulocytes %, Automated 0.1 0.0 - 0.9 %    Lymphocytes % 11.4 13.0 - 44.0 %    Monocytes % 5.7 2.0 - 10.0 %    Eosinophils % 4.4 0.0 - 6.0 %    Basophils % 0.5 0.0 - 2.0 %    Neutrophils Absolute 5.71 1.20 - 7.70 x10*3/uL    Immature Granulocytes Absolute, Automated 0.01 0.00 - 0.70 x10*3/uL    Lymphocytes Absolute 0.84 (L) 1.20 - 4.80 x10*3/uL    Monocytes Absolute 0.42 0.10 - 1.00 x10*3/uL    Eosinophils Absolute 0.32 0.00 - 0.70 x10*3/uL    Basophils Absolute 0.04 0.00 - 0.10 x10*3/uL   Comprehensive metabolic panel   Result Value Ref Range    Glucose 101 (H) 65 - 99 mg/dL    Sodium 133 133 - 145 mmol/L    Potassium 6.9 (HH) 3.4 - 5.1 mmol/L    Chloride 88 (L) 97 - 107 mmol/L    Bicarbonate 21 (L) 24 - 31 mmol/L "    Urea Nitrogen 56 (H) 8 - 25 mg/dL    Creatinine 10.30 (H) 0.40 - 1.60 mg/dL    eGFR 4 (L) >60 mL/min/1.73m*2    Calcium 7.2 (L) 8.5 - 10.4 mg/dL    Albumin 4.0 3.5 - 5.0 g/dL    Alkaline Phosphatase 70 35 - 125 U/L    Total Protein 8.2 (H) 5.9 - 7.9 g/dL    AST 14 5 - 40 U/L    Bilirubin, Total 0.3 0.1 - 1.2 mg/dL    ALT 6 5 - 40 U/L    Anion Gap >19 (H) <=19 mmol/L   SARS-CoV-2 RT PCR   Result Value Ref Range    Coronavirus 2019, PCR Not Detected Not Detected   ECG 12 lead   Result Value Ref Range    Ventricular Rate 69 BPM    Atrial Rate 69 BPM    N[place] Interval 176 ms    QRS Duration 72 ms    QT Interval 418 ms    QTC Calculation(Bazett) 447 ms    P Axis 68 degrees    R Axis 16 degrees    T Axis 80 degrees    QRS Count 11 beats    Q Onset 223 ms    P Onset 135 ms    P Offset 186 ms    T Offset 432 ms    QTC Fredericia 438 ms     ECG 12 lead    Result Date: 1/8/2024  Sinus rhythm with Premature supraventricular complexes Otherwise normal ECG When compared with ECG of 01-JAN-2023 10:57, Nonspecific T wave abnormality no longer evident in Inferior leads Nonspecific T wave abnormality no longer evident in Lateral leads    XR chest 2 views    Result Date: 1/8/2024  STUDY: Chest Radiographs;  [INSERT month/day/year xx/x/xxxx and Time x:xx using AM or PM)] INDICATION: Portacath dislodgement, pain COMPARISON: 9/12/2023 XR Chest ACCESSION NUMBER(S): [id number] ORDERING CLINICIAN: SILVANO LOVE TECHNIQUE:  Frontal and lateral chest. FINDINGS: Both lungs are clear. The heart and mediastinum are of normal size and contour.  No pleural effusion.  No pneumothorax. No acute bony abnormality identified. Cerclage wires within the sternum. Aortic valve replacement. LEFT dialysis catheter tips RIGHT atrium. Vascular stents bilateral axilla. Retained pericardial leads.    LEFT dialysis catheter tips RIGHT atrium. No findings of an acute cardiopulmonary process. Signed by Washington Goldstein MD      Physical exam  Constitutional:   Alert and oriented to person, place, date/time in no acute distress.  HEENT:  Atraumatic, normocephalic. PERRL. EOMI.  Nares patent.  Mucous membranes moist.    Neck:  Trachea midline.  Respiratory:  Clear to auscultation.  Cardiac:  Regular rate and rhythm.  Left chest dialysis catheter, cuff is at skin wall, no surrounding periwound erythema, no drainage, catheter is functioning.  Cardiovascular:  No edema of the extremities.  Pulse exam: Radial and femoral pulses palpable bilateral.  Scars noted to bilateral upper extremities and left thigh secondary to multiple AV fistulas placements, nonfunctioning.  Diffuse scars noted to chest wall secondary to dialysis catheter placements.  Abdominal:  Soft, nontender, nondistended, bowel sounds present.  Musculoskeletal:  Moves extremities freely.  Dermatological: Clean and dry   Neurological: Alert and oriented to person, place, date/time  Psych:  Calm, cooperative    Assessment and Plan    Dialysis catheter displacement  End-stage renal disease on dialysis  Hyperkalemia  Mitral valve endocarditis, MRSA bacteremia    1.  Dialysis catheter displacement: Line secured with Steri-Strips, russell to use per Dr. Rogers who reached out to the nephrologist.  Dr. Rogers recommends catheter placement by IR under sedation.   Defer to primary to schedule.    2.  End-stage renal disease on dialysis: As per Dr. Rogers on 1/8 russell to use left chest tunneled dialysis catheter.    3.  Hyperkalemia: Nephrology following, needs dialysis    4.  Mitral valve endocarditis, MRSA bacteremia: Patient followed by infectious disease, Dr. Soto.  She completed IV antibiotics with dialysis.  Per Dr. Soto's note in September he advised likely plan for lifelong suppressive therapy.  I am not seeing an oral antibiotic as an outpatient, listed in the reconciliation .  Consider ID consult or discussion with ID regarding this.

## 2024-01-08 NOTE — Clinical Note
Patient Clipped and Prepped: chest. Prepped with ChloraPrep, a minimum of 3 minute dry time, longer if needed, no pooling noted, patient draped in sterile fashion.

## 2024-01-08 NOTE — ED PROVIDER NOTES
HPI   Chief Complaint   Patient presents with    Vascular Access Problem     Pt catheter is falling out       HPI                    No data recorded                Patient History   No past medical history on file.  Past Surgical History:   Procedure Laterality Date    IR CVC TUNNELED  9/9/2022    IR CVC TUNN[place] 9[place]20[place] [facility] [place]    IR CVC TUNNELED  12/28/2022    IR CVC TUNNELED 12[place]28/2022 [facility] [place]    US GUIDED PERCUTANEOUS PLACEMENT  7/14/2022    US GUIDED PERCUTANEOUS PLACEMENT [facility] EMERGENCY LEGACY     No family history on file.  Social History     Tobacco Use    Smoking status: Not on file    Smokeless tobacco: Not on file   Substance Use Topics    Alcohol use: Not on file    Drug use: Not on file       Physical Exam   ED Triage Vitals [01/08/24 0548]   Temp Heart Rate Resp BP   36.8 °C (98.2 °F) 83 18 --      SpO2 Temp Source Heart Rate Source Patient Position   97 % Tympanic Brachial Sitting      BP Location FiO2 (%)     Left leg --       Physical Exam    ED Course & MDM   Diagnoses as of 01/08/24 1623   End-stage renal disease on hemodialysis (CMS/[facility])   Hyperkalemia   Anemia, unspecified type   Hypertension, unspecified type       Medical Decision Making      The patient is a 48-year-old female presenting to the emergency department for evaluation of vascular access issues.  The patient does have a left internal jugular tunneled catheter for dialysis.  It was placed in June 2023 by Dr. Rogers, vascular surgery, she states that when she went to dialysis this morning they noticed it was loose and falling out.  She states that they sent her to the emergency room to have a replacement of the catheter.  She states that she has some pain just around the catheter site but does not have any other symptoms.  She does not recall any specific injury or trauma that caused the catheter did dislodge.  She denies any headache or visual changes.  No neck or back pain.  No palpitations or shortness of  breath.  No abdominal pain.  No nausea or vomiting.  No diarrhea or constipation.  No urinary complaints.  No focal weakness or numbness.  No vaginal discharge.  No fever or chills.  No cough or congestion.  All pertinent positives and negatives are recorded above.  All other systems reviewed and otherwise negative.  Vital signs with hypertension but otherwise within normal limits.  Physical exam with a well-nourished well-developed female in no acute distress.  HEENT exam within normal limits.  She has no evidence of airway compromise or respiratory distress.  Abdominal exam is benign.  She has no gross motor, neurologic or vascular deficits on exam.      EKG with sinus rhythm at 69 bpm, normal axis, normal voltage, normal ST segment, normal T waves      Oral acetaminophen and IV morphine ordered      Diagnostic labs with evidence of chronic renal failure, hyperkalemia and anemia but otherwise unremarkable.      IV calcium, IV insulin and IV dextrose ordered for treatment of her hyperkalemia      XR chest 2 views   Final Result   LEFT dialysis catheter tips RIGHT atrium.   No findings of an acute cardiopulmonary process.   Signed by Washington Goldstein MD             The patient does not have any evidence of ischemia on EKG.  No events on telemetry.  The diagnostic labs do show evidence of chronic renal failure with hyperkalemia and anemia.  Chest x-ray does not show any evidence of pneumonia or pneumothorax.  No evidence of CHF.      The case was discussed with vascular surgery, Dr. Rogers, he will arrange for replacement of the tunneled catheter and/or dialysis port placement.      Dr. Pedersen, nephrology, will arrange for dialysis      Patient was admitted for further management of her symptoms and for further management of her hyperkalemia.        Impression/diagnosis  End-stage renal disease on hemodialysis  Dislodgment of the left internal jugular tunneled catheter  3.  Hypertension, unspecified  4.  Anemia,  unspecified  5.  Electrolyte imbalance with hyperkalemia      Critical care time of  31 minutes billed for management of hyperkalemia with monitoring patient to telemetry, consultation with nephrology, consultation with vascular surgery, consultation with accepting provider, consultation with the patient regarding her results, initiation of IV dextrose, and IV insulin, and IV calcium..  This time excludes time for billable procedures.      I reviewed the results of the diagnostic labs and diagnostic imaging.  Formal radiology reading was completed by the radiologist    Procedure  Procedures     Africa Mcgowan MD  01/08/24 6916       Africa Mcgowan MD  01/08/24 8777

## 2024-01-08 NOTE — CONSULTS
.Reason For Consult  End-stage kidney disease and hyperkalemia    History Of Present Illness  Batsheva Page is a 48 y.o. female who is known to my service with underlying end-stage kidney disease on dialysis on Monday Wednesday and Friday went to have her dialysis early this morning got her dialysis catheter was partially dislodged so she was sent to the emergency room emergency room had contacted vascular surgery who looked at the catheter and stated that that catheter could be used until he fix this later the patient clinically feels fine however she was found to have severe hyperkalemia for which she was given IV calcium gluconate IV dextrose and insulin and emergency dialysis was scheduled     Review of Systems  10 points review of system was done all negative except was positive for the history of present illness    Past Medical History  She has a past medical history of Aortic valve replaced (2022), Chronic pain, ESRD (end stage renal disease) (CMS/[facility]), H/O mitral valve replacement (2013), Heart disease, Hypertension, Mitral valve regurgitation, and Seizures (CMS/[facility]).    Surgical History  She has a past surgical history that includes IR CVC tunneled (09/09/2022); IR CVC tunneled (12/28/2022); US guided percutaneous placement (07/14/2022); Parathyroidectomy; Coronary artery bypass graft; Mitral valve replacement (2013); Aortic valve replacement (2020); and Mitral valve replacement (2020).     Social History  She reports that she has never smoked. She has never used smokeless tobacco. No history on file for alcohol use and drug use.    Family History  No family history on file.     Current Facility-Administered Medications:     acetaminophen (Tylenol) tablet 650 mg, 650 mg, oral, q6h PRN, Kinza Weiner DO    aspirin EC tablet 81 mg, 81 mg, oral, Daily, Kinza Weiner DO, 81 mg at 01/08/24 1142    atorvastatin (Lipitor) tablet 40 mg, 40 mg, oral, Nightly, Kinza Weiner DO    [START ON  1/9/2024] buPROPion (Wellbutrin) tablet 100 mg, 100 mg, oral, Every other day, Kinza Weiner DO    calcitriol (Rocaltrol) capsule 0.5 mcg, 0.5 mcg, oral, Daily, Kinza Weiner DO, 0.5 mcg at 01/08/24 1224    cloNIDine (Catapres) tablet 0.1 mg, 0.1 mg, oral, q8h KIMBERLY, Kinza Weiner DO, 0.1 mg at 01/08/24 1806    heparin (porcine) injection 5,000 Units, 5,000 Units, subcutaneous, q8h KIMBERLY, Kinza Weiner DO    heparin 1,000 unit/mL injection 1,000 Units, 1,000 Units, intra-catheter, After Dialysis, Zhou Pedersen MD, 2,100 Units at 01/08/24 1701    heparin 1,000 unit/mL injection 1,000 Units, 1,000 Units, intra-catheter, After Dialysis, Zhou Pedersen MD, 2,100 Units at 01/08/24 1701    hydrALAZINE (Apresoline) tablet 50 mg, 50 mg, oral, TID, Kinza Weiner DO, 50 mg at 01/08/24 1806    levETIRAcetam (Keppra) tablet 500 mg, 500 mg, oral, Nightly, Kinza Weiner DO    metoprolol succinate XL (Toprol-XL) 24 hr tablet 50 mg, 50 mg, oral, BID, Kinza Weiner DO, 50 mg at 01/08/24 1142    morphine injection 2 mg, 2 mg, intravenous, q4h PRN, Kinza Weiner DO, 2 mg at 01/08/24 1806    oxyCODONE-acetaminophen (Percocet) 5-325 mg per tablet 1 tablet, 1 tablet, oral, q6h PRN, Kinza Weiner DO    pregabalin (Lyrica) capsule 25 mg, 25 mg, oral, BID, Kinza Weiner DO    promethazine (Phenergan) 12.5 mg in sodium chloride 0.9% 50 mL IV, 12.5 mg, intravenous, q6h PRN, Kinza Weiner DO    Current Outpatient Medications:     aspirin 81 mg EC tablet, Take 1 tablet (81 mg) by mouth once daily., Disp: , Rfl:     atorvastatin (Lipitor) 40 mg tablet, Take 1 tablet (40 mg) by mouth once daily., Disp: , Rfl:     B complex-vitamin C-folic acid (Nephrocaps) 1 mg capsule, Take 1 capsule by mouth once daily., Disp: , Rfl:     buPROPion (Wellbutrin) 100 mg tablet, Take 1 tablet (100 mg) by mouth every other day., Disp: , Rfl:     calcitriol (Rocaltrol) 0.25 mcg capsule, Take 2 capsules (0.5  mcg) by mouth once daily., Disp: , Rfl:     cloNIDine (Catapres) 0.3 mg tablet, Take 0.1 mg by mouth. FOUR TIMES DAILY ON DIALYSIS DAYS AND 3 TABLETS ON NON TREATMENT DAYS, Disp: , Rfl:     ergocalciferol (Vitamin D-2) 1.25 MG (20274 UT) capsule, Take 1 capsule (1,250 mcg) by mouth 1 (one) time per week., Disp: , Rfl:     eszopiclone (Lunesta) 1 mg tablet, Take 1 tablet (1 mg) by mouth once daily at bedtime., Disp: , Rfl:     hydrALAZINE (Apresoline) 50 mg tablet, Take 1 tablet (50 mg) by mouth 3 times a day., Disp: , Rfl:     levETIRAcetam (Keppra) 500 mg tablet, Take 1 tablet (500 mg) by mouth once daily at bedtime., Disp: , Rfl:     metoprolol succinate XL (Toprol-XL) 50 mg 24 hr tablet, TAKE 1 TABLET BY MOUTH TWICE DAILY, Disp: 60 tablet, Rfl: 0    pregabalin (Lyrica) 25 mg capsule, Take by mouth 2 times a day. at 7AM and 10PM, Disp: , Rfl:     promethazine (Phenergan) 25 mg tablet, Take 1 tablet (25 mg) by mouth once daily as needed., Disp: , Rfl:     sodium zirconium cyclosilicate (Lokelma) 10 gram packet, DISSOLVE 1 PACKET INTO 45ML OF WATER TAKE DAILY BEFORE DINNER. ADMINSTER OTHER MEDICTIONS AT LEAST 2 HOURS BEFORE OR 2 HOURS AFTER LOKELMA, Disp: 30 each, Rfl: 0   Allergies  Kayexalate, Metoclopramide hcl, Prochlorperazine, and Ondansetron         Physical Exam  Physical Exam  Constitutional:       General: She is not in acute distress.     Appearance: She is not toxic-appearing.   HENT:      Head: Normocephalic and atraumatic.   Eyes:      Extraocular Movements: Extraocular movements intact.      Pupils: Pupils are equal, round, and reactive to light.   Neck:      Vascular: No carotid bruit.   Cardiovascular:      Rate and Rhythm: Normal rate and regular rhythm.   Pulmonary:      Effort: No respiratory distress.      Breath sounds: No stridor. No wheezing, rhonchi or rales.   Chest:      Chest wall: No tenderness.   Abdominal:      General: There is no distension.      Palpations: There is no mass.       "Tenderness: There is no abdominal tenderness. There is no right CVA tenderness, left CVA tenderness or guarding.      Hernia: No hernia is present.   Musculoskeletal:         General: No swelling or tenderness.      Cervical back: No rigidity.      Right lower leg: No edema.      Left lower leg: No edema.   Lymphadenopathy:      Cervical: No cervical adenopathy.   Skin:     General: Skin is warm and dry.      Coloration: Skin is not jaundiced or pale.      Findings: No bruising or erythema.   Neurological:      General: No focal deficit present.      Mental Status: She is alert and oriented to person, place, and time.   Psychiatric:         Mood and Affect: Mood normal.         Behavior: Behavior normal.              I&O 24HR    Intake/Output Summary (Last 24 hours) at 1/8/2024 1859  Last data filed at 1/8/2024 1718  Gross per 24 hour   Intake 1200 ml   Output 3708 ml   Net -2508 ml       Vitals 24HR  Heart Rate:  [74-83]   Temp:  [35.9 °C (96.6 °F)-36.8 °C (98.2 °F)]   Resp:  [17-18]   BP: (114-196)/(47-52)   Height:  [167.6 cm (5' 6\")]   Weight:  [65 kg (143 lb 4.8 oz)]   SpO2:  [97 %-99 %]     Relevant Results        Results for orders placed or performed during the hospital encounter of 01/08/24 (from the past 96 hour(s))   CBC and Auto Differential   Result Value Ref Range    WBC 7.3 4.4 - 11.3 x10*3/uL    nRBC 0.0 0.0 - 0.0 /100 WBCs    RBC 3.28 (L) 4.00 - 5.20 x10*6/uL    Hemoglobin 7.7 (L) 12.0 - 16.0 g/dL    Hematocrit 26.2 (L) 36.0 - 46.0 %    MCV 80 80 - 100 fL    MCH 23.5 (L) 26.0 - 34.0 pg    MCHC 29.4 (L) 32.0 - 36.0 g/dL    RDW 17.3 (H) 11.5 - 14.5 %    Platelets 240 150 - 450 x10*3/uL    Neutrophils % 77.9 40.0 - 80.0 %    Immature Granulocytes %, Automated 0.1 0.0 - 0.9 %    Lymphocytes % 11.4 13.0 - 44.0 %    Monocytes % 5.7 2.0 - 10.0 %    Eosinophils % 4.4 0.0 - 6.0 %    Basophils % 0.5 0.0 - 2.0 %    Neutrophils Absolute 5.71 1.20 - 7.70 x10*3/uL    Immature Granulocytes Absolute, Automated 0.01 " 0.00 - 0.70 x10*3/uL    Lymphocytes Absolute 0.84 (L) 1.20 - 4.80 x10*3/uL    Monocytes Absolute 0.42 0.10 - 1.00 x10*3/uL    Eosinophils Absolute 0.32 0.00 - 0.70 x10*3/uL    Basophils Absolute 0.04 0.00 - 0.10 x10*3/uL   Comprehensive metabolic panel   Result Value Ref Range    Glucose 101 (H) 65 - 99 mg/dL    Sodium 133 133 - 145 mmol/L    Potassium 6.9 (HH) 3.4 - 5.1 mmol/L    Chloride 88 (L) 97 - 107 mmol/L    Bicarbonate 21 (L) 24 - 31 mmol/L    Urea Nitrogen 56 (H) 8 - 25 mg/dL    Creatinine 10.30 (H) 0.40 - 1.60 mg/dL    eGFR 4 (L) >60 mL/min/1.73m*2    Calcium 7.2 (L) 8.5 - 10.4 mg/dL    Albumin 4.0 3.5 - 5.0 g/dL    Alkaline Phosphatase 70 35 - 125 U/L    Total Protein 8.2 (H) 5.9 - 7.9 g/dL    AST 14 5 - 40 U/L    Bilirubin, Total 0.3 0.1 - 1.2 mg/dL    ALT 6 5 - 40 U/L    Anion Gap >19 (H) <=19 mmol/L   SARS-CoV-2 RT PCR   Result Value Ref Range    Coronavirus 2019, PCR Not Detected Not Detected   ECG 12 lead   Result Value Ref Range    Ventricular Rate 69 BPM    Atrial Rate 69 BPM    [place] Interval 176 ms    QRS Duration 72 ms    QT Interval 418 ms    QTC Calculation(Bazett) 447 ms    P Axis 68 degrees    R Axis 16 degrees    T Axis 80 degrees    QRS Count 11 beats    Q Onset 223 ms    P Onset 135 ms    P Offset 186 ms    T Offset 432 ms    QTC Fredericia 438 ms   Basic metabolic panel   Result Value Ref Range    Glucose 26 (LL) 65 - 99 mg/dL    Sodium 136 133 - 145 mmol/L    Potassium 5.0 3.4 - 5.1 mmol/L    Chloride 92 (L) 97 - 107 mmol/L    Bicarbonate 21 (L) 24 - 31 mmol/L    Urea Nitrogen 59 (H) 8 - 25 mg/dL    Creatinine 10.70 (H) 0.40 - 1.60 mg/dL    eGFR 4 (L) >60 mL/min/1.73m*2    Calcium 7.0 (L) 8.5 - 10.4 mg/dL    Anion Gap >19 (H) <=19 mmol/L   POCT GLUCOSE   Result Value Ref Range    POCT Glucose 71 (L) 74 - 99 mg/dL          Assessment/Plan     ECG 12 lead    Result Date: 1/8/2024  Sinus rhythm with Premature supraventricular complexes Otherwise normal ECG When compared with ECG of  01-JAN-2023 10:57, Nonspecific T wave abnormality no longer evident in Inferior leads Nonspecific T wave abnormality no longer evident in Lateral leads    XR chest 2 views    Result Date: 1/8/2024  STUDY: Chest Radiographs;  [INSERT month/day/year xx/x/xxxx and Time x:xx using AM or PM)] INDICATION: Portacath dislodgement, pain COMPARISON: 9/12/2023 XR Chest ACCESSION NUMBER(S): [id number] ORDERING CLINICIAN: SILVANO LOVE TECHNIQUE:  Frontal and lateral chest. FINDINGS: Both lungs are clear. The heart and mediastinum are of normal size and contour.  No pleural effusion.  No pneumothorax. No acute bony abnormality identified. Cerclage wires within the sternum. Aortic valve replacement. LEFT dialysis catheter tips RIGHT atrium. Vascular stents bilateral axilla. Retained pericardial leads.    LEFT dialysis catheter tips RIGHT atrium. No findings of an acute cardiopulmonary process. Signed by Washington Goldstein MD    Impression:  End-stage kidney disease  Hyperkalemia  Malfunctioning dialysis catheter  Hypertension  Anemia of chronic kidney disease  History of recurrent endocarditis    Recommendations:  Emergency dialysis  Vascular to change her dialysis catheter  Renal diet with low potassium diet  Resume home medications  Erythropoietin therapy for anemia        Zhou Pedersen, MDConsults

## 2024-01-08 NOTE — PROCEDURES
Report received from FARAZ Osorio  Patient transported to hemodialysis room via ED cart   Name and  verified verbally by patient.   Bed weight unable to obtain, no scale on cart. Weight reported as 65kg in ED today.  Patient is on ROOM AIR  Prescribed Hemodialysis orders are verified in the machine.  Hemodialysis orders are for 2 Liters of fluid removal.    Patient has a LEFT SUBCLAVIAN CVC  Dressing is CLEAN DRY AND INTACT.   Pt presented to ED today from dialysis center for catheter coming out. Dr. Rogers gave OK to use for dialysis after confirming placement with X ray. Catheter secured in place with steri strips and island dressing. Dressing not removed.     CVC accessed with aseptic technique.   Caps removed.  Arterial and venous lumens aspirated without difficulty.  Patency of both lumens checked with 2 (10) ml normal saline flushes.    Treatment initiated @ 1359    HD ended 30min early @ 1700 per pt demand. Dr. LIBIA Pedersen notified.   Net fluid loss of 1654cc post dialysis.  All blood returned. Catheter ports flushed with NS, 2.1cc heparin dwell instilled per MAR, ports capped securely.  VS WNL, 148/43 HR 75. Patient denies complaints.  Report given to FARAZ Osorio via secure chat, unable to reach by phone.  Patient returned to ED 06 via transport team.

## 2024-01-09 ENCOUNTER — APPOINTMENT (OUTPATIENT)
Dept: CARDIOLOGY | Facility: HOSPITAL | Age: 49
DRG: 698 | End: 2024-01-09
Payer: MEDICARE

## 2024-01-09 ENCOUNTER — APPOINTMENT (OUTPATIENT)
Dept: DIALYSIS | Facility: HOSPITAL | Age: 49
End: 2024-01-09
Payer: MEDICARE

## 2024-01-09 LAB
ANION GAP SERPL CALC-SCNC: 15 MMOL/L
BUN SERPL-MCNC: 27 MG/DL (ref 8–25)
CALCIUM SERPL-MCNC: 7.2 MG/DL (ref 8.5–10.4)
CHLORIDE SERPL-SCNC: 94 MMOL/L (ref 97–107)
CO2 SERPL-SCNC: 25 MMOL/L (ref 24–31)
CREAT SERPL-MCNC: 5.9 MG/DL (ref 0.4–1.6)
EGFRCR SERPLBLD CKD-EPI 2021: 8 ML/MIN/1.73M*2
ERYTHROCYTE [DISTWIDTH] IN BLOOD BY AUTOMATED COUNT: 17.3 % (ref 11.5–14.5)
GLUCOSE SERPL-MCNC: 92 MG/DL (ref 65–99)
HCT VFR BLD AUTO: 24.5 % (ref 36–46)
HGB BLD-MCNC: 7.4 G/DL (ref 12–16)
MCH RBC QN AUTO: 23.2 PG (ref 26–34)
MCHC RBC AUTO-ENTMCNC: 30.2 G/DL (ref 32–36)
MCV RBC AUTO: 77 FL (ref 80–100)
NRBC BLD-RTO: 0 /100 WBCS (ref 0–0)
PLATELET # BLD AUTO: 211 X10*3/UL (ref 150–450)
POTASSIUM SERPL-SCNC: 5 MMOL/L (ref 3.4–5.1)
POTASSIUM SERPL-SCNC: 6.3 MMOL/L (ref 3.4–5.1)
RBC # BLD AUTO: 3.19 X10*6/UL (ref 4–5.2)
SODIUM SERPL-SCNC: 134 MMOL/L (ref 133–145)
WBC # BLD AUTO: 6.7 X10*3/UL (ref 4.4–11.3)

## 2024-01-09 PROCEDURE — 96376 TX/PRO/DX INJ SAME DRUG ADON: CPT | Mod: 59

## 2024-01-09 PROCEDURE — 93005 ELECTROCARDIOGRAM TRACING: CPT

## 2024-01-09 PROCEDURE — G0378 HOSPITAL OBSERVATION PER HR: HCPCS

## 2024-01-09 PROCEDURE — 85027 COMPLETE CBC AUTOMATED: CPT | Performed by: HOSPITALIST

## 2024-01-09 PROCEDURE — 84132 ASSAY OF SERUM POTASSIUM: CPT | Performed by: HOSPITALIST

## 2024-01-09 PROCEDURE — 80048 BASIC METABOLIC PNL TOTAL CA: CPT | Performed by: HOSPITALIST

## 2024-01-09 PROCEDURE — 87040 BLOOD CULTURE FOR BACTERIA: CPT | Mod: WESLAB | Performed by: INTERNAL MEDICINE

## 2024-01-09 PROCEDURE — 2500000004 HC RX 250 GENERAL PHARMACY W/ HCPCS (ALT 636 FOR OP/ED)

## 2024-01-09 PROCEDURE — 36415 COLL VENOUS BLD VENIPUNCTURE: CPT | Performed by: INTERNAL MEDICINE

## 2024-01-09 PROCEDURE — 2500000004 HC RX 250 GENERAL PHARMACY W/ HCPCS (ALT 636 FOR OP/ED): Performed by: INTERNAL MEDICINE

## 2024-01-09 PROCEDURE — 36415 COLL VENOUS BLD VENIPUNCTURE: CPT | Performed by: HOSPITALIST

## 2024-01-09 PROCEDURE — 2500000004 HC RX 250 GENERAL PHARMACY W/ HCPCS (ALT 636 FOR OP/ED): Performed by: HOSPITALIST

## 2024-01-09 PROCEDURE — 2500000001 HC RX 250 WO HCPCS SELF ADMINISTERED DRUGS (ALT 637 FOR MEDICARE OP): Performed by: INTERNAL MEDICINE

## 2024-01-09 PROCEDURE — 2500000001 HC RX 250 WO HCPCS SELF ADMINISTERED DRUGS (ALT 637 FOR MEDICARE OP): Performed by: HOSPITALIST

## 2024-01-09 PROCEDURE — 90937 HEMODIALYSIS REPEATED EVAL: CPT

## 2024-01-09 RX ORDER — DIPHENHYDRAMINE HYDROCHLORIDE 50 MG/ML
50 INJECTION INTRAMUSCULAR; INTRAVENOUS ONCE
Status: COMPLETED | OUTPATIENT
Start: 2024-01-09 | End: 2024-01-09

## 2024-01-09 RX ORDER — VANCOMYCIN HYDROCHLORIDE 1 G/20ML
INJECTION, POWDER, LYOPHILIZED, FOR SOLUTION INTRAVENOUS DAILY PRN
Status: DISCONTINUED | OUTPATIENT
Start: 2024-01-10 | End: 2024-01-11

## 2024-01-09 RX ORDER — DIPHENHYDRAMINE HYDROCHLORIDE 50 MG/ML
25 INJECTION INTRAMUSCULAR; INTRAVENOUS EVERY 6 HOURS
Status: DISCONTINUED | OUTPATIENT
Start: 2024-01-09 | End: 2024-01-10

## 2024-01-09 RX ORDER — VANCOMYCIN 1 G/200ML
1000 INJECTION, SOLUTION INTRAVENOUS ONCE
Status: COMPLETED | OUTPATIENT
Start: 2024-01-09 | End: 2024-01-09

## 2024-01-09 RX ORDER — VANCOMYCIN 1 G/200ML
1 INJECTION, SOLUTION INTRAVENOUS ONCE
Status: COMPLETED | OUTPATIENT
Start: 2024-01-09 | End: 2024-01-09

## 2024-01-09 RX ORDER — HEPARIN SODIUM 1000 [USP'U]/ML
1000 INJECTION, SOLUTION INTRAVENOUS; SUBCUTANEOUS
Status: CANCELLED | OUTPATIENT
Start: 2024-01-09

## 2024-01-09 RX ORDER — RIFAMPIN 300 MG/1
300 CAPSULE ORAL EVERY 8 HOURS
Status: DISCONTINUED | OUTPATIENT
Start: 2024-01-09 | End: 2024-01-15 | Stop reason: HOSPADM

## 2024-01-09 RX ADMIN — DIPHENHYDRAMINE HYDROCHLORIDE 50 MG: 50 INJECTION, SOLUTION INTRAMUSCULAR; INTRAVENOUS at 10:35

## 2024-01-09 RX ADMIN — MORPHINE SULFATE 2 MG: 2 INJECTION, SOLUTION INTRAMUSCULAR; INTRAVENOUS at 02:32

## 2024-01-09 RX ADMIN — HEPARIN SODIUM 2100 UNITS: 1000 INJECTION INTRAVENOUS; SUBCUTANEOUS at 12:00

## 2024-01-09 RX ADMIN — CLONIDINE HYDROCHLORIDE 0.1 MG: 0.1 TABLET ORAL at 13:22

## 2024-01-09 RX ADMIN — VANCOMYCIN 1000 MG: 1 INJECTION, SOLUTION INTRAVENOUS at 12:10

## 2024-01-09 RX ADMIN — MORPHINE SULFATE 2 MG: 2 INJECTION, SOLUTION INTRAMUSCULAR; INTRAVENOUS at 06:34

## 2024-01-09 RX ADMIN — RIFAMPIN 300 MG: 300 CAPSULE ORAL at 17:21

## 2024-01-09 RX ADMIN — HYDRALAZINE HYDROCHLORIDE 50 MG: 50 TABLET ORAL at 14:07

## 2024-01-09 RX ADMIN — DIPHENHYDRAMINE HYDROCHLORIDE 25 MG: 50 INJECTION, SOLUTION INTRAMUSCULAR; INTRAVENOUS at 13:22

## 2024-01-09 RX ADMIN — MORPHINE SULFATE 2 MG: 2 INJECTION, SOLUTION INTRAMUSCULAR; INTRAVENOUS at 18:31

## 2024-01-09 RX ADMIN — HEPARIN SODIUM 2100 UNITS: 1000 INJECTION INTRAVENOUS; SUBCUTANEOUS at 12:01

## 2024-01-09 RX ADMIN — METOPROLOL SUCCINATE 50 MG: 50 TABLET, EXTENDED RELEASE ORAL at 21:58

## 2024-01-09 RX ADMIN — MORPHINE SULFATE 2 MG: 2 INJECTION, SOLUTION INTRAMUSCULAR; INTRAVENOUS at 13:22

## 2024-01-09 RX ADMIN — MORPHINE SULFATE 2 MG: 2 INJECTION, SOLUTION INTRAMUSCULAR; INTRAVENOUS at 22:43

## 2024-01-09 RX ADMIN — DIPHENHYDRAMINE HYDROCHLORIDE 25 MG: 50 INJECTION, SOLUTION INTRAMUSCULAR; INTRAVENOUS at 18:32

## 2024-01-09 RX ADMIN — VANCOMYCIN 1 G: 1 INJECTION, SOLUTION INTRAVENOUS at 17:21

## 2024-01-09 RX ADMIN — CLONIDINE HYDROCHLORIDE 0.1 MG: 0.1 TABLET ORAL at 06:40

## 2024-01-09 SDOH — SOCIAL STABILITY: SOCIAL INSECURITY: DO YOU FEEL UNSAFE GOING BACK TO THE PLACE WHERE YOU ARE LIVING?: NO

## 2024-01-09 SDOH — SOCIAL STABILITY: SOCIAL INSECURITY: ARE THERE ANY APPARENT SIGNS OF INJURIES/BEHAVIORS THAT COULD BE RELATED TO ABUSE/NEGLECT?: NO

## 2024-01-09 SDOH — SOCIAL STABILITY: SOCIAL INSECURITY: WERE YOU ABLE TO COMPLETE ALL THE BEHAVIORAL HEALTH SCREENINGS?: YES

## 2024-01-09 SDOH — SOCIAL STABILITY: SOCIAL INSECURITY: ARE YOU OR HAVE YOU BEEN THREATENED OR ABUSED PHYSICALLY, EMOTIONALLY, OR SEXUALLY BY ANYONE?: NO

## 2024-01-09 SDOH — SOCIAL STABILITY: SOCIAL INSECURITY: DO YOU FEEL ANYONE HAS EXPLOITED OR TAKEN ADVANTAGE OF YOU FINANCIALLY OR OF YOUR PERSONAL PROPERTY?: NO

## 2024-01-09 SDOH — SOCIAL STABILITY: SOCIAL INSECURITY: HAS ANYONE EVER THREATENED TO HURT YOUR FAMILY OR YOUR PETS?: NO

## 2024-01-09 SDOH — SOCIAL STABILITY: SOCIAL INSECURITY: ABUSE: ADULT

## 2024-01-09 SDOH — SOCIAL STABILITY: SOCIAL INSECURITY: HAVE YOU HAD THOUGHTS OF HARMING ANYONE ELSE?: NO

## 2024-01-09 SDOH — SOCIAL STABILITY: SOCIAL INSECURITY: DOES ANYONE TRY TO KEEP YOU FROM HAVING/CONTACTING OTHER FRIENDS OR DOING THINGS OUTSIDE YOUR HOME?: NO

## 2024-01-09 ASSESSMENT — ACTIVITIES OF DAILY LIVING (ADL)
HEARING - LEFT EAR: FUNCTIONAL
JUDGMENT_ADEQUATE_SAFELY_COMPLETE_DAILY_ACTIVITIES: YES
HEARING - RIGHT EAR: FUNCTIONAL
ADEQUATE_TO_COMPLETE_ADL: YES
BATHING: INDEPENDENT
WALKS IN HOME: INDEPENDENT
TOILETING: INDEPENDENT
DRESSING YOURSELF: INDEPENDENT
PATIENT'S MEMORY ADEQUATE TO SAFELY COMPLETE DAILY ACTIVITIES?: YES
FEEDING YOURSELF: INDEPENDENT
GROOMING: INDEPENDENT

## 2024-01-09 ASSESSMENT — LIFESTYLE VARIABLES
SUBSTANCE_ABUSE_PAST_12_MONTHS: NO
EVER FELT BAD OR GUILTY ABOUT YOUR DRINKING: NO
HAVE YOU EVER FELT YOU SHOULD CUT DOWN ON YOUR DRINKING: NO
REASON UNABLE TO ASSESS: NO
HOW OFTEN DO YOU HAVE A DRINK CONTAINING ALCOHOL: NEVER
HOW OFTEN DO YOU HAVE 6 OR MORE DRINKS ON ONE OCCASION: NEVER
SKIP TO QUESTIONS 9-10: 1
HOW MANY STANDARD DRINKS CONTAINING ALCOHOL DO YOU HAVE ON A TYPICAL DAY: PATIENT DOES NOT DRINK
AUDIT-C TOTAL SCORE: 0
PRESCIPTION_ABUSE_PAST_12_MONTHS: NO
HAVE PEOPLE ANNOYED YOU BY CRITICIZING YOUR DRINKING: NO
EVER HAD A DRINK FIRST THING IN THE MORNING TO STEADY YOUR NERVES TO GET RID OF A HANGOVER: NO
AUDIT-C TOTAL SCORE: 0

## 2024-01-09 ASSESSMENT — PATIENT HEALTH QUESTIONNAIRE - PHQ9
SUM OF ALL RESPONSES TO PHQ9 QUESTIONS 1 & 2: 0
2. FEELING DOWN, DEPRESSED OR HOPELESS: NOT AT ALL
1. LITTLE INTEREST OR PLEASURE IN DOING THINGS: NOT AT ALL

## 2024-01-09 ASSESSMENT — PAIN - FUNCTIONAL ASSESSMENT
PAIN_FUNCTIONAL_ASSESSMENT: 0-10

## 2024-01-09 ASSESSMENT — COGNITIVE AND FUNCTIONAL STATUS - GENERAL
CLIMB 3 TO 5 STEPS WITH RAILING: A LITTLE
MOBILITY SCORE: 23
DAILY ACTIVITIY SCORE: 24

## 2024-01-09 ASSESSMENT — PAIN DESCRIPTION - ORIENTATION: ORIENTATION: LEFT

## 2024-01-09 ASSESSMENT — PAIN SCALES - GENERAL
PAINLEVEL_OUTOF10: 0 - NO PAIN
PAINLEVEL_OUTOF10: 7
PAINLEVEL_OUTOF10: 8
PAINLEVEL_OUTOF10: 3
PAINLEVEL_OUTOF10: 9
PAINLEVEL_OUTOF10: 0 - NO PAIN

## 2024-01-09 ASSESSMENT — PAIN DESCRIPTION - LOCATION: LOCATION: OTHER (COMMENT)

## 2024-01-09 NOTE — PROGRESS NOTES
"Batsheva Page is a 48 y.o. female on day 0 of admission presenting with Hyperkalemia.    Subjective   Patient was admitted yesterday with malfunctioning dialysis catheter since she has severe hyperkalemia she was dialyzed yesterday however her potassium did jump up to 6.2 again today she feels fine she just had a positive blood cultures 1 g of vancomycin is ordered and ID was consulted       Objective     Physical Exam  Neck:      Vascular: No carotid bruit.   Cardiovascular:      Rate and Rhythm: Normal rate and regular rhythm.      Heart sounds: No murmur heard.     No friction rub. No gallop.   Pulmonary:      Breath sounds: No wheezing, rhonchi or rales.   Chest:      Chest wall: No tenderness.   Abdominal:      General: There is no distension.      Tenderness: There is no abdominal tenderness. There is no guarding or rebound.   Musculoskeletal:         General: No swelling or tenderness.      Cervical back: Neck supple.      Right lower leg: No edema.      Left lower leg: No edema.   Lymphadenopathy:      Cervical: No cervical adenopathy.         Last Recorded Vitals  Blood pressure (!) 90/47, pulse 64, temperature 35.8 °C (96.4 °F), temperature source Temporal, resp. rate 17, height 1.676 m (5' 5.98\"), weight 65 kg (143 lb 4.8 oz), SpO2 98 %.    Intake/Output last 3 Shifts:  I/O last 3 completed shifts:  In: 1200 (18.5 mL/kg) [I.V.:800 (12.3 mL/kg); Other:400]  Out: 3708 (57 mL/kg) [Other:3708]  Weight: 65 kg     Current Facility-Administered Medications:     acetaminophen (Tylenol) tablet 650 mg, 650 mg, oral, q6h PRN, Kinza Weiner DO    aspirin EC tablet 81 mg, 81 mg, oral, Daily, Kinza Weiner DO, 81 mg at 01/08/24 1142    atorvastatin (Lipitor) tablet 40 mg, 40 mg, oral, Nightly, Kinza Weiner DO    buPROPion (Wellbutrin) tablet 100 mg, 100 mg, oral, Every other day, Kinza Weiner DO    calcitriol (Rocaltrol) capsule 0.5 mcg, 0.5 mcg, oral, Daily, Kinza Weiner DO, 0.5 " mcg at 01/08/24 1224    cloNIDine (Catapres) tablet 0.1 mg, 0.1 mg, oral, q8h [facility], Kinza Weiner DO, 0.1 mg at 01/09/24 0640    diphenhydrAMINE (BENADryl) injection 50 mg, 50 mg, intravenous, Once, Zhou Pedersen MD    heparin (porcine) injection 5,000 Units, 5,000 Units, subcutaneous, q8h [facility], Kinza Weiner DO    heparin 1,000 unit/mL injection 1,000 Units, 1,000 Units, intra-catheter, After Dialysis, Zhou Pedersen MD, 2,100 Units at 01/08/24 1701    heparin 1,000 unit/mL injection 1,000 Units, 1,000 Units, intra-catheter, After Dialysis, Zhou Pedersen MD, 2,100 Units at 01/08/24 1701    hydrALAZINE (Apresoline) tablet 50 mg, 50 mg, oral, TID, Kinza Weiner DO, 50 mg at 01/08/24 1806    levETIRAcetam (Keppra) tablet 500 mg, 500 mg, oral, Nightly, Kinza Weiner DO, 500 mg at 01/08/24 2221    metoprolol succinate XL (Toprol-XL) 24 hr tablet 50 mg, 50 mg, oral, BID, Kinza Weiner DO, 50 mg at 01/08/24 2221    morphine injection 2 mg, 2 mg, intravenous, q4h PRN, Kinza Weiner DO, 2 mg at 01/09/24 0634    oxyCODONE-acetaminophen (Percocet) 5-325 mg per tablet 1 tablet, 1 tablet, oral, q6h PRN, Kinza Weiner DO    pregabalin (Lyrica) capsule 25 mg, 25 mg, oral, BID, Kinza Weiner DO, 25 mg at 01/08/24 2222    promethazine (Phenergan) 12.5 mg in sodium chloride 0.9% 50 mL IV, 12.5 mg, intravenous, q6h PRN, Kinza Weiner DO, 12.5 mg at 01/08/24 2152    vancomycin (Xellia) 1 g in 200 mL (Xellia) IVPB 1,000 mg, 1,000 mg, intravenous, Once, Kinza Weiner,     Current Outpatient Medications:     atorvastatin (Lipitor) 40 mg tablet, Take 1 tablet (40 mg) by mouth once daily., Disp: , Rfl:     calcitriol (Rocaltrol) 0.25 mcg capsule, Take 2 capsules (0.5 mcg) by mouth once daily., Disp: , Rfl:     cloNIDine (Catapres) 0.3 mg tablet, Take 0.1 mg by mouth. FOUR TIMES DAILY ON DIALYSIS DAYS AND 3 TABLETS ON NON TREATMENT DAYS, Disp: , Rfl:     ergocalciferol (Vitamin D-2)  1.25 MG (59935 UT) capsule, Take 1 capsule (1,250 mcg) by mouth 1 (one) time per week., Disp: , Rfl:     eszopiclone (Lunesta) 1 mg tablet, Take 1 tablet (1 mg) by mouth once daily at bedtime., Disp: , Rfl:     hydrALAZINE (Apresoline) 50 mg tablet, Take 1 tablet (50 mg) by mouth 3 times a day., Disp: , Rfl:     levETIRAcetam (Keppra) 500 mg tablet, Take 1 tablet (500 mg) by mouth once daily at bedtime., Disp: , Rfl:     metoprolol succinate XL (Toprol-XL) 50 mg 24 hr tablet, TAKE 1 TABLET BY MOUTH TWICE DAILY, Disp: 60 tablet, Rfl: 0    pregabalin (Lyrica) 25 mg capsule, Take by mouth 2 times a day. at 7AM and 10PM, Disp: , Rfl:     promethazine (Phenergan) 25 mg tablet, Take 1 tablet (25 mg) by mouth once daily as needed., Disp: , Rfl:     sodium zirconium cyclosilicate (Lokelma) 10 gram packet, DISSOLVE 1 PACKET INTO 45ML OF WATER TAKE DAILY BEFORE DINNER. ADMINSTER OTHER MEDICTIONS AT LEAST 2 HOURS BEFORE OR 2 HOURS AFTER LOKELMA, Disp: 30 each, Rfl: 0   Relevant Results    Results for orders placed or performed during the hospital encounter of 01/08/24 (from the past 96 hour(s))   CBC and Auto Differential   Result Value Ref Range    WBC 7.3 4.4 - 11.3 x10*3/uL    nRBC 0.0 0.0 - 0.0 /100 WBCs    RBC 3.28 (L) 4.00 - 5.20 x10*6/uL    Hemoglobin 7.7 (L) 12.0 - 16.0 g/dL    Hematocrit 26.2 (L) 36.0 - 46.0 %    MCV 80 80 - 100 fL    MCH 23.5 (L) 26.0 - 34.0 pg    MCHC 29.4 (L) 32.0 - 36.0 g/dL    RDW 17.3 (H) 11.5 - 14.5 %    Platelets 240 150 - 450 x10*3/uL    Neutrophils % 77.9 40.0 - 80.0 %    Immature Granulocytes %, Automated 0.1 0.0 - 0.9 %    Lymphocytes % 11.4 13.0 - 44.0 %    Monocytes % 5.7 2.0 - 10.0 %    Eosinophils % 4.4 0.0 - 6.0 %    Basophils % 0.5 0.0 - 2.0 %    Neutrophils Absolute 5.71 1.20 - 7.70 x10*3/uL    Immature Granulocytes Absolute, Automated 0.01 0.00 - 0.70 x10*3/uL    Lymphocytes Absolute 0.84 (L) 1.20 - 4.80 x10*3/uL    Monocytes Absolute 0.42 0.10 - 1.00 x10*3/uL    Eosinophils  Absolute 0.32 0.00 - 0.70 x10*3/uL    Basophils Absolute 0.04 0.00 - 0.10 x10*3/uL   Comprehensive metabolic panel   Result Value Ref Range    Glucose 101 (H) 65 - 99 mg/dL    Sodium 133 133 - 145 mmol/L    Potassium 6.9 (HH) 3.4 - 5.1 mmol/L    Chloride 88 (L) 97 - 107 mmol/L    Bicarbonate 21 (L) 24 - 31 mmol/L    Urea Nitrogen 56 (H) 8 - 25 mg/dL    Creatinine 10.30 (H) 0.40 - 1.60 mg/dL    eGFR 4 (L) >60 mL/min/1.73m*2    Calcium 7.2 (L) 8.5 - 10.4 mg/dL    Albumin 4.0 3.5 - 5.0 g/dL    Alkaline Phosphatase 70 35 - 125 U/L    Total Protein 8.2 (H) 5.9 - 7.9 g/dL    AST 14 5 - 40 U/L    Bilirubin, Total 0.3 0.1 - 1.2 mg/dL    ALT 6 5 - 40 U/L    Anion Gap >19 (H) <=19 mmol/L   SARS-CoV-2 RT PCR   Result Value Ref Range    Coronavirus 2019, PCR Not Detected Not Detected   ECG 12 lead   Result Value Ref Range    Ventricular Rate 69 BPM    Atrial Rate 69 BPM    FL Interval 176 ms    QRS Duration 72 ms    QT Interval 418 ms    QTC Calculation(Bazett) 447 ms    P Axis 68 degrees    R Axis 16 degrees    T Axis 80 degrees    QRS Count 11 beats    Q Onset 223 ms    P Onset 135 ms    P Offset 186 ms    T Offset 432 ms    QTC Fredericia 438 ms   Blood Culture    Specimen: Peripheral Venipuncture; Blood culture   Result Value Ref Range    Blood Culture       Identification and susceptibility testing to follow    Gram Stain Gram positive cocci, clusters (AA)    Blood Culture    Specimen: Peripheral Venipuncture; Blood culture   Result Value Ref Range    Blood Culture       Identification and susceptibility testing to follow    Gram Stain Gram positive cocci, clusters (AA)    Basic metabolic panel   Result Value Ref Range    Glucose 26 (LL) 65 - 99 mg/dL    Sodium 136 133 - 145 mmol/L    Potassium 5.0 3.4 - 5.1 mmol/L    Chloride 92 (L) 97 - 107 mmol/L    Bicarbonate 21 (L) 24 - 31 mmol/L    Urea Nitrogen 59 (H) 8 - 25 mg/dL    Creatinine 10.70 (H) 0.40 - 1.60 mg/dL    eGFR 4 (L) >60 mL/min/1.73m*2    Calcium 7.0 (L) 8.5 -  10.4 mg/dL    Anion Gap >19 (H) <=19 mmol/L   POCT GLUCOSE   Result Value Ref Range    POCT Glucose 71 (L) 74 - 99 mg/dL   Basic Metabolic Panel   Result Value Ref Range    Glucose 92 65 - 99 mg/dL    Sodium 134 133 - 145 mmol/L    Potassium 6.3 (HH) 3.4 - 5.1 mmol/L    Chloride 94 (L) 97 - 107 mmol/L    Bicarbonate 25 24 - 31 mmol/L    Urea Nitrogen 27 (H) 8 - 25 mg/dL    Creatinine 5.90 (H) 0.40 - 1.60 mg/dL    eGFR 8 (L) >60 mL/min/1.73m*2    Calcium 7.2 (L) 8.5 - 10.4 mg/dL    Anion Gap 15 <=19 mmol/L   CBC   Result Value Ref Range    WBC 6.7 4.4 - 11.3 x10*3/uL    nRBC 0.0 0.0 - 0.0 /100 WBCs    RBC 3.19 (L) 4.00 - 5.20 x10*6/uL    Hemoglobin 7.4 (L) 12.0 - 16.0 g/dL    Hematocrit 24.5 (L) 36.0 - 46.0 %    MCV 77 (L) 80 - 100 fL    MCH 23.2 (L) 26.0 - 34.0 pg    MCHC 30.2 (L) 32.0 - 36.0 g/dL    RDW 17.3 (H) 11.5 - 14.5 %    Platelets 211 150 - 450 x10*3/uL     Impression and plan;    End-stage kidney disease  Hyperkalemia  Malfunctioning dialysis catheter  Hypertension  Anemia of chronic kidney disease  History of recurrent endocarditis    Plan:  Proceed with dialysis today stat, consult infectious disease,  IV vancomycin until ID sees the patient, will need change of hemodialysis catheter                   Zhou Pedersen MD

## 2024-01-09 NOTE — ED NOTES
MD Pedersen and Husam notified of gram positive blood cultures and high K+ levels.      Catina Wu RN  01/09/24 0914

## 2024-01-09 NOTE — ED NOTES
Report has been given to dialysis nurse. She is leaving off the floor for treatment.      Catina Wu RN  01/09/24 0082

## 2024-01-09 NOTE — PROGRESS NOTES
Vancomycin Dosing by Pharmacy- INITIAL (HEMODIALYSIS)    Batsheva Page is a 48 y.o. year old female who Pharmacy has been consulted for vancomycin dosing for Vancomycin Indications: Endocarditis. Based on the patient's indication and renal status this patient will be dosed based on a Pre-HD level of 20-25 mcg/mL.     Patient is currently on hemodialysis.    Initial Loading Dosing:has already been given a loading dose of 1000 mg in ED 1/9 1200 yielding ~ 15 mg/kg/dose    Visit Vitals  /62   Pulse 73   Temp 35.6 °C (96.1 °F) (Temporal)   Resp 17           Lab Results   Component Value Date    CREATININE 5.90 (H) 01/09/2024    CREATININE 10.70 (H) 01/08/2024    CREATININE 10.30 (H) 01/08/2024    CREATININE 8.9 (H) 09/22/2023    CREATININE 7.4 (H) 09/21/2023    CREATININE 5.9 (H) 09/20/2023    CREATININE 8.8 (H) 09/19/2023       I/O last 3 completed shifts:  In: 1200 (18.5 mL/kg) [I.V.:800 (12.3 mL/kg); Other:400]  Out: 3708 (57 mL/kg) [Other:3708]  Weight: 65 kg     Assessment/Plan     Given proximity of dialysis following one-time, subtherapeutic vancomycin dose given today in ED, will add an addtl 1 gm this afternoon to ensure pt achieves ~25 mg/kg loading dose per  Vancomycin Dosing Guidelines.  Plan for 500 mg maintenance dose after each dialysis session, pending pre-HD level result.  Dialysis schedule unknown at this time. Spoke with RN in ED whom stated pt's outpt schedule is M/W/F. Dialyzed today due to hyperkalemia. Will assume dialysis scheduled for tomorrow and order pre-HD level to be obtained on 1/10 with AM labs. May be obtained sooner if clinically indicated.   Will continue to monitor renal function daily while on vancomycin and order serum creatinine at least every 48 hours if not already ordered.  Follow for continued vancomycin needs, clinical response, and signs/symptoms of toxicity.     Adama Bonner, PharmD

## 2024-01-09 NOTE — ED NOTES
Pt have refused all medications this morning. Stated she did not want to go to dialysis. She goes every other day stated she had a treatment yesterday. Md notified of abnormal morning results and low B/P.      Catina Wu RN  01/09/24 0823       Catina Wu RN  01/09/24 0828

## 2024-01-09 NOTE — ED NOTES
Pt came back to ER from dialysis 30mins early from treatment because she stated she felt weak. I have obtained VS and administered vanco per orders. Pt denied tylenol      Catina Wu RN  01/09/24 1209       Catina Wu RN  01/09/24 1215

## 2024-01-09 NOTE — ED NOTES
MD has been notified of new morning lab results. No new orders at this time. Will get dialysis today.      Catina Wu RN  01/09/24 0752

## 2024-01-09 NOTE — ED NOTES
Notified of Blood cultures. New orders are placed. Will not get a new dialysis catheter placed today until we can fix the infection. IR has been called and updated.      Catina Wu RN  01/09/24 1016

## 2024-01-09 NOTE — PROGRESS NOTES
Batsheva Page is a 48 y.o. female on day 0 of admission presenting with Hyperkalemia.      Subjective   Patient reports she has been having chills and body aches. Still with pain in left side chest wall at site of permacath.  Reports itching with vancomycin and requests benadryl IV.    Blood cultures positive       Objective     Last Recorded Vitals  /62   Pulse 73   Temp 35.6 °C (96.1 °F) (Temporal)   Resp 17   Wt 65 kg (143 lb 4.8 oz)   SpO2 97%   Intake/Output last 3 Shifts:    Intake/Output Summary (Last 24 hours) at 1/9/2024 1825  Last data filed at 1/9/2024 1150  Gross per 24 hour   Intake 1600 ml   Output 2349 ml   Net -749 ml       Admission Weight  Weight: 65 kg (143 lb 4.8 oz) (01/08/24 0548)    Daily Weight  01/08/24 : 65 kg (143 lb 4.8 oz)    Image Results  ECG 12 lead  Sinus rhythm with Premature supraventricular complexes  Otherwise normal ECG  When compared with ECG of 01-JAN-2023 10:57,  Nonspecific T wave abnormality no longer evident in Inferior leads  Nonspecific T wave abnormality no longer evident in Lateral leads  Confirmed by Tico Marie ([id number]) on 1/8/2024 10:28:06 PM  XR chest 2 views  Narrative: STUDY:  Chest Radiographs;  [INSERT month/day/year xx/x/xxxx and Time x:xx  using AM or PM)]  INDICATION:  Portacath dislodgement, pain  COMPARISON:  9/12/2023 XR Chest  ACCESSION NUMBER(S):  [id number]  ORDERING CLINICIAN:  SILVANO LOVE  TECHNIQUE:  Frontal and lateral chest.   FINDINGS:  Both lungs are clear. The heart and mediastinum are of normal size and  contour.  No pleural effusion.  No pneumothorax. No acute bony  abnormality identified. Cerclage wires within the sternum. Aortic  valve replacement. LEFT dialysis catheter tips RIGHT atrium. Vascular  stents bilateral axilla. Retained pericardial leads.  Impression: LEFT dialysis catheter tips RIGHT atrium.  No findings of an acute cardiopulmonary process.  Signed by Washington Goldstein MD      Physical Exam  General:  alert, no diaphoresis   Lungs: CTA BL   Heart: RRR, +murmur ,no LE edema BL   GI: abdomen soft, nontender, nondistended, BS present   MSK: no joint effusion or deformity   Skin: scabs noted on BL legs and pitting scars noted on UE and LE    Neuro: grossly normal cognition, motor strength, sensation    Relevant Results             Results for orders placed or performed during the hospital encounter of 01/08/24 (from the past 24 hour(s))   Basic Metabolic Panel   Result Value Ref Range    Glucose 92 65 - 99 mg/dL    Sodium 134 133 - 145 mmol/L    Potassium 6.3 (HH) 3.4 - 5.1 mmol/L    Chloride 94 (L) 97 - 107 mmol/L    Bicarbonate 25 24 - 31 mmol/L    Urea Nitrogen 27 (H) 8 - 25 mg/dL    Creatinine 5.90 (H) 0.40 - 1.60 mg/dL    eGFR 8 (L) >60 mL/min/1.73m*2    Calcium 7.2 (L) 8.5 - 10.4 mg/dL    Anion Gap 15 <=19 mmol/L   CBC   Result Value Ref Range    WBC 6.7 4.4 - 11.3 x10*3/uL    nRBC 0.0 0.0 - 0.0 /100 WBCs    RBC 3.19 (L) 4.00 - 5.20 x10*6/uL    Hemoglobin 7.4 (L) 12.0 - 16.0 g/dL    Hematocrit 24.5 (L) 36.0 - 46.0 %    MCV 77 (L) 80 - 100 fL    MCH 23.2 (L) 26.0 - 34.0 pg    MCHC 30.2 (L) 32.0 - 36.0 g/dL    RDW 17.3 (H) 11.5 - 14.5 %    Platelets 211 150 - 450 x10*3/uL     Scheduled medications  aspirin, 81 mg, oral, Daily  atorvastatin, 40 mg, oral, Nightly  buPROPion, 100 mg, oral, Every other day  calcitriol, 0.5 mcg, oral, Daily  cloNIDine, 0.1 mg, oral, q8h KIMBERLY  diphenhydrAMINE, 25 mg, intravenous, q6h  heparin (porcine), 5,000 Units, subcutaneous, q8h KIMBERLY  heparin, 1,000 Units, intra-catheter, After Dialysis  heparin, 1,000 Units, intra-catheter, After Dialysis  hydrALAZINE, 50 mg, oral, TID  levETIRAcetam, 500 mg, oral, Nightly  metoprolol succinate XL, 50 mg, oral, BID  pregabalin, 25 mg, oral, BID  rifAMPin, 300 mg, oral, q8h      Continuous medications     PRN medications  PRN medications: acetaminophen, morphine, oxyCODONE-acetaminophen, promethazine, [START ON 1/10/2024]  vancomycin      Assessment/Plan                  Principal Problem:    Hyperkalemia      Staph aureus bacteremia  - cultures positive this am and gave patient a dose of vanco. Vanco ordered. Now growing back Staph aureus. Discussed with Dr. Soto and Dr. Pedersen.   - Dr. Soto has added rifampin.  - consult cardiology for LAURA, patient with history of endocarditis of bioprosthetic valves    Hyperkalemia  ESRD  Dislodged permacath  - Vascular surgery consulted, they stabilized the line and said okay to use. Recommend IR placing new HD line  - Got tired of the Staph aureus bacteremia patient hyperkalemic again today.  She had another stat dialysis this morning.  Concernedly her blood cultures are positive and she has very limited access sites.  We will have to time appropriately when and if we can get the permacath out and changed to a new line.  Discussed extensively with Dr. Soto and Dr. Pedersen.     H/o endocarditis-- see above  - with bioprosthetic valves, recently abx stopped. Normally follows with Dr. Soto.   - will send a set of surveillance blood cultures now-- patient with no signs/symptoms of bacteremia/endocarditis at the moment     HTN  - continue home meds     Depression  - continue buproprion     CAD  - statin, aspirin, beta blocker     Chronic pain  - continue patient's normal pain meds    Itching  - benadryl PRN     DVT ppx              Kinza Weiner DO

## 2024-01-09 NOTE — ED NOTES
MD notified of blood culture results. No new orders at this time      Catina Wu, FARAZ  01/09/24 1856

## 2024-01-10 ENCOUNTER — APPOINTMENT (OUTPATIENT)
Dept: CARDIOLOGY | Facility: HOSPITAL | Age: 49
DRG: 698 | End: 2024-01-10
Payer: MEDICARE

## 2024-01-10 LAB
ABO GROUP (TYPE) IN BLOOD: NORMAL
ANION GAP SERPL CALC-SCNC: 15 MMOL/L
ANTIBODY SCREEN: NORMAL
BUN SERPL-MCNC: 22 MG/DL (ref 8–25)
CALCIUM SERPL-MCNC: 8.1 MG/DL (ref 8.5–10.4)
CHLORIDE SERPL-SCNC: 96 MMOL/L (ref 97–107)
CO2 SERPL-SCNC: 23 MMOL/L (ref 24–31)
CREAT SERPL-MCNC: 4.6 MG/DL (ref 0.4–1.6)
EGFRCR SERPLBLD CKD-EPI 2021: 11 ML/MIN/1.73M*2
ERYTHROCYTE [DISTWIDTH] IN BLOOD BY AUTOMATED COUNT: 17.3 % (ref 11.5–14.5)
GLUCOSE SERPL-MCNC: 93 MG/DL (ref 65–99)
HCT VFR BLD AUTO: 22.9 % (ref 36–46)
HGB BLD-MCNC: 6.7 G/DL (ref 12–16)
MCH RBC QN AUTO: 22.8 PG (ref 26–34)
MCHC RBC AUTO-ENTMCNC: 29.3 G/DL (ref 32–36)
MCV RBC AUTO: 78 FL (ref 80–100)
NRBC BLD-RTO: 0 /100 WBCS (ref 0–0)
PLATELET # BLD AUTO: 194 X10*3/UL (ref 150–450)
POTASSIUM SERPL-SCNC: 5 MMOL/L (ref 3.4–5.1)
RBC # BLD AUTO: 2.94 X10*6/UL (ref 4–5.2)
RH FACTOR (ANTIGEN D): NORMAL
SODIUM SERPL-SCNC: 134 MMOL/L (ref 133–145)
VANCOMYCIN SERPL-MCNC: 40.8 UG/ML (ref 10–20)
WBC # BLD AUTO: 6.1 X10*3/UL (ref 4.4–11.3)

## 2024-01-10 PROCEDURE — 99222 1ST HOSP IP/OBS MODERATE 55: CPT

## 2024-01-10 PROCEDURE — 2500000001 HC RX 250 WO HCPCS SELF ADMINISTERED DRUGS (ALT 637 FOR MEDICARE OP): Performed by: HOSPITALIST

## 2024-01-10 PROCEDURE — 86920 COMPATIBILITY TEST SPIN: CPT

## 2024-01-10 PROCEDURE — 86901 BLOOD TYPING SEROLOGIC RH(D): CPT | Performed by: INTERNAL MEDICINE

## 2024-01-10 PROCEDURE — 2500000001 HC RX 250 WO HCPCS SELF ADMINISTERED DRUGS (ALT 637 FOR MEDICARE OP): Performed by: INTERNAL MEDICINE

## 2024-01-10 PROCEDURE — 80048 BASIC METABOLIC PNL TOTAL CA: CPT | Performed by: HOSPITALIST

## 2024-01-10 PROCEDURE — 36415 COLL VENOUS BLD VENIPUNCTURE: CPT | Performed by: INTERNAL MEDICINE

## 2024-01-10 PROCEDURE — 2500000004 HC RX 250 GENERAL PHARMACY W/ HCPCS (ALT 636 FOR OP/ED): Performed by: HOSPITALIST

## 2024-01-10 PROCEDURE — 80202 ASSAY OF VANCOMYCIN: CPT

## 2024-01-10 PROCEDURE — 93306 TTE W/DOPPLER COMPLETE: CPT | Performed by: INTERNAL MEDICINE

## 2024-01-10 PROCEDURE — 6350000001 HC RX 635 EPOETIN >10,000 UNITS: Mod: JZ | Performed by: INTERNAL MEDICINE

## 2024-01-10 PROCEDURE — 93306 TTE W/DOPPLER COMPLETE: CPT

## 2024-01-10 PROCEDURE — 85027 COMPLETE CBC AUTOMATED: CPT | Performed by: HOSPITALIST

## 2024-01-10 PROCEDURE — 2060000001 HC INTERMEDIATE ICU ROOM DAILY

## 2024-01-10 PROCEDURE — 36415 COLL VENOUS BLD VENIPUNCTURE: CPT | Performed by: HOSPITALIST

## 2024-01-10 PROCEDURE — 99233 SBSQ HOSP IP/OBS HIGH 50: CPT | Performed by: NURSE PRACTITIONER

## 2024-01-10 RX ORDER — HEPARIN SODIUM 1000 [USP'U]/ML
1000 INJECTION, SOLUTION INTRAVENOUS; SUBCUTANEOUS
Status: CANCELLED | OUTPATIENT
Start: 2024-01-11

## 2024-01-10 RX ORDER — DIPHENHYDRAMINE HYDROCHLORIDE 50 MG/ML
25 INJECTION INTRAMUSCULAR; INTRAVENOUS EVERY 6 HOURS PRN
Status: DISCONTINUED | OUTPATIENT
Start: 2024-01-10 | End: 2024-01-10

## 2024-01-10 RX ORDER — DIPHENHYDRAMINE HYDROCHLORIDE 50 MG/ML
25 INJECTION INTRAMUSCULAR; INTRAVENOUS EVERY 4 HOURS PRN
Status: DISCONTINUED | OUTPATIENT
Start: 2024-01-10 | End: 2024-01-15 | Stop reason: HOSPADM

## 2024-01-10 RX ADMIN — DIPHENHYDRAMINE HYDROCHLORIDE 25 MG: 50 INJECTION, SOLUTION INTRAMUSCULAR; INTRAVENOUS at 20:48

## 2024-01-10 RX ADMIN — MORPHINE SULFATE 2 MG: 2 INJECTION, SOLUTION INTRAMUSCULAR; INTRAVENOUS at 20:49

## 2024-01-10 RX ADMIN — LEVETIRACETAM 500 MG: 500 TABLET, FILM COATED ORAL at 20:49

## 2024-01-10 RX ADMIN — DIPHENHYDRAMINE HYDROCHLORIDE 25 MG: 50 INJECTION, SOLUTION INTRAMUSCULAR; INTRAVENOUS at 12:08

## 2024-01-10 RX ADMIN — MORPHINE SULFATE 2 MG: 2 INJECTION, SOLUTION INTRAMUSCULAR; INTRAVENOUS at 15:51

## 2024-01-10 RX ADMIN — ATORVASTATIN CALCIUM 40 MG: 40 TABLET, FILM COATED ORAL at 20:49

## 2024-01-10 RX ADMIN — RIFAMPIN 300 MG: 300 CAPSULE ORAL at 00:19

## 2024-01-10 RX ADMIN — HYDRALAZINE HYDROCHLORIDE 50 MG: 50 TABLET ORAL at 20:50

## 2024-01-10 RX ADMIN — RIFAMPIN 300 MG: 300 CAPSULE ORAL at 23:36

## 2024-01-10 RX ADMIN — PREGABALIN 25 MG: 25 CAPSULE ORAL at 21:39

## 2024-01-10 RX ADMIN — DIPHENHYDRAMINE HYDROCHLORIDE 25 MG: 50 INJECTION, SOLUTION INTRAMUSCULAR; INTRAVENOUS at 15:51

## 2024-01-10 RX ADMIN — RIFAMPIN 300 MG: 300 CAPSULE ORAL at 15:48

## 2024-01-10 RX ADMIN — CLONIDINE HYDROCHLORIDE 0.1 MG: 0.1 TABLET ORAL at 21:39

## 2024-01-10 RX ADMIN — CALCITRIOL CAPSULES 0.25 MCG 0.5 MCG: 0.25 CAPSULE ORAL at 08:12

## 2024-01-10 RX ADMIN — RIFAMPIN 300 MG: 300 CAPSULE ORAL at 08:11

## 2024-01-10 RX ADMIN — MORPHINE SULFATE 2 MG: 2 INJECTION, SOLUTION INTRAMUSCULAR; INTRAVENOUS at 12:08

## 2024-01-10 RX ADMIN — METOPROLOL SUCCINATE 50 MG: 50 TABLET, EXTENDED RELEASE ORAL at 20:49

## 2024-01-10 RX ADMIN — MORPHINE SULFATE 2 MG: 2 INJECTION, SOLUTION INTRAMUSCULAR; INTRAVENOUS at 04:02

## 2024-01-10 RX ADMIN — DIPHENHYDRAMINE HYDROCHLORIDE 25 MG: 50 INJECTION, SOLUTION INTRAMUSCULAR; INTRAVENOUS at 00:19

## 2024-01-10 RX ADMIN — DIPHENHYDRAMINE HYDROCHLORIDE 25 MG: 50 INJECTION, SOLUTION INTRAMUSCULAR; INTRAVENOUS at 06:52

## 2024-01-10 RX ADMIN — MORPHINE SULFATE 2 MG: 2 INJECTION, SOLUTION INTRAMUSCULAR; INTRAVENOUS at 08:12

## 2024-01-10 RX ADMIN — EPOETIN ALFA-EPBX 6000 UNITS: 20000 INJECTION, SOLUTION INTRAVENOUS; SUBCUTANEOUS at 11:30

## 2024-01-10 ASSESSMENT — COGNITIVE AND FUNCTIONAL STATUS - GENERAL
DAILY ACTIVITIY SCORE: 24
MOBILITY SCORE: 23
CLIMB 3 TO 5 STEPS WITH RAILING: A LITTLE
CLIMB 3 TO 5 STEPS WITH RAILING: A LITTLE
MOBILITY SCORE: 23
DAILY ACTIVITIY SCORE: 24

## 2024-01-10 ASSESSMENT — PAIN SCALES - GENERAL
PAINLEVEL_OUTOF10: 4
PAINLEVEL_OUTOF10: 9
PAINLEVEL_OUTOF10: 0 - NO PAIN
PAINLEVEL_OUTOF10: 9
PAINLEVEL_OUTOF10: 9

## 2024-01-10 ASSESSMENT — PAIN - FUNCTIONAL ASSESSMENT
PAIN_FUNCTIONAL_ASSESSMENT: 0-10
PAIN_FUNCTIONAL_ASSESSMENT: 0-10
PAIN_FUNCTIONAL_ASSESSMENT: FLACC (FACE, LEGS, ACTIVITY, CRY, CONSOLABILITY)
PAIN_FUNCTIONAL_ASSESSMENT: 0-10

## 2024-01-10 ASSESSMENT — ENCOUNTER SYMPTOMS
CHILLS: 1
PALPITATIONS: 0
DYSPNEA ON EXERTION: 0

## 2024-01-10 ASSESSMENT — PAIN DESCRIPTION - LOCATION
LOCATION: LEG
LOCATION: GENERALIZED

## 2024-01-10 NOTE — CONSULTS
"Inpatient consult to Cardiology  Consult performed by: Jennifer Graham, ORI-CNP  Consult ordered by: Kinza Weiner DO  Reason for consult: Concern for Endocarditis        History Of Present Illness:    Batsheva Page is a 48 y.o. female presenting with displacement of her dialysis permacath.  Patient has a significant past medical history including MRSA septicemia, end-stage renal disease on dialysis, history of endocarditis, hypertension, coronary artery disease, bioprosthetic aortic and mitral valve replacements in September of 2022.  Patient presented the emergency department yesterday after her dialysis nurse stated that she could not proceed with dialysis as the patient's line was \"too far out\". Patient states that she has been having some discomfort around the catheter site, but no other symptoms.  Lab work in the emergency department showed sodium 133, very elevated potassium of 6.9, elevated creatinine of 10.30 and hemoglobin of 7.7.  Chest x-ray completed in the emergency department showed no acute cardiopulmonary processes.  EKG completed in the emergency department showed normal sinus rhythm with occasional PVCs but no acute ST or T wave changes.  Patient was started on IV vancomycin and was admitted for further assessment and management.    Review of Systems   Constitutional: Positive for chills.   Cardiovascular:  Negative for chest pain, dyspnea on exertion, leg swelling and palpitations.   All other systems reviewed and are negative.        Last Recorded Vitals:  Vitals:    01/09/24 2100 01/10/24 0025 01/10/24 0346 01/10/24 0906   BP: (!) 112/40 (!) 110/36 (!) 102/23 (!) 104/41   BP Location: Right leg Left leg Left leg Left arm   Patient Position: Lying Lying Lying Lying   Pulse: 71 65 61 62   Resp: 18 18 18 18   Temp: 37.4 °C (99.3 °F) 36.5 °C (97.7 °F) 36.5 °C (97.7 °F) 36.4 °C (97.5 °F)   TempSrc: Temporal Temporal Temporal Temporal   SpO2: 100% 100% 99% 100%   Weight: 64.4 kg (141 lb " "15.6 oz)      Height: 1.651 m (5' 5\")          Last Labs:  CBC - 1/10/2024:  4:29 AM  6.1 6.7 194    22.9      CMP - 1/10/2024:  4:29 AM  8.1 8.2 14 --- 0.3   5.8 4.0 6 70      PTT - 6/20/2023:  8:36 AM  1.1   11.7 24.6     Hemoglobin A1C   Date/Time Value Ref Range Status   12/23/2022 05:12 PM 4.6 % Final     Comment:          Diagnosis of Diabetes-Adults   Non-Diabetic: < or = 5.6%   Increased risk for developing diabetes: 5.7-6.4%   Diagnostic of diabetes: > or = 6.5%  .       Monitoring of Diabetes                Age (y)     Therapeutic Goal (%)   Adults:          >18           <7.0   Pediatrics:    13-18           <7.5                   7-12           <8.0                   0- 6            7.5-8.5   American Diabetes Association. Diabetes Care 33(S1), Jan 2010.     09/05/2022 03:04 AM 4.2 % Final     Comment:          Diagnosis of Diabetes-Adults   Non-Diabetic: < or = 5.6%   Increased risk for developing diabetes: 5.7-6.4%   Diagnostic of diabetes: > or = 6.5%  .       Monitoring of Diabetes                Age (y)     Therapeutic Goal (%)   Adults:          >18           <7.0   Pediatrics:    13-18           <7.5                   7-12           <8.0                   0- 6            7.5-8.5   American Diabetes Association. Diabetes Care 33(S1), Jan 2010.       VLDL   Date/Time Value Ref Range Status   09/05/2022 03:04 AM 45 (H) 0 - 40 mg/dL Final      Last I/O:  I/O last 3 completed shifts:  In: 1600 (24.8 mL/kg) [I.V.:1200 (18.6 mL/kg); Other:400]  Out: 2349 (36.5 mL/kg) [Other:2349]  Weight: 64.4 kg     Past Cardiology Tests (Last 3 Years):  EKG:  ECG 12 lead 01/08/2024    Echo:  No results found for this or any previous visit from the past 1095 days.    Ejection Fractions:  No results found for: \"EF\"  Cath:  No results found for this or any previous visit from the past 1095 days.    Stress Test:  No results found for this or any previous visit from the past 1095 days.    Cardiac Imaging:  No results found " for this or any previous visit from the past 1095 days.      Past Medical History:  She has a past medical history of Aortic valve replaced (2022), CHF (congestive heart failure) (CMS/[facility]), Chronic pain, Coronary artery disease, Disease of thyroid gland, ESRD (end stage renal disease) (CMS/[facility]), H/O mitral valve replacement (2013), Heart disease, History of transfusion, Hypertension, Mitral valve regurgitation, Seizures (CMS/[facility]), and Stroke (CMS/[facility]).    Past Surgical History:  She has a past surgical history that includes IR CVC tunneled (09/09/2022); IR CVC tunneled (12/28/2022); US guided percutaneous placement (07/14/2022); Parathyroidectomy; Coronary artery bypass graft; Mitral valve replacement (2013); Aortic valve replacement (2020); and Mitral valve replacement (2020).      Social History:  She reports that she has never smoked. She has never used smokeless tobacco. She reports that she does not drink alcohol and does not use drugs.    Family History:  Family History   Problem Relation Name Age of Onset    No Known Problems Mother      No Known Problems Father          Allergies:  Kayexalate, Metoclopramide hcl, Prochlorperazine, and Ondansetron    Inpatient Medications:  Scheduled medications   Medication Dose Route Frequency    aspirin  81 mg oral Daily    atorvastatin  40 mg oral Nightly    buPROPion  100 mg oral Every other day    calcitriol  0.5 mcg oral Daily    cloNIDine  0.1 mg oral q8h KIMBERLY    diphenhydrAMINE  25 mg intravenous q6h    heparin (porcine)  5,000 Units subcutaneous q8h KIMBERLY    heparin  1,000 Units intra-catheter After Dialysis    heparin  1,000 Units intra-catheter After Dialysis    hydrALAZINE  50 mg oral TID    levETIRAcetam  500 mg oral Nightly    metoprolol succinate XL  50 mg oral BID    pregabalin  25 mg oral BID    rifAMPin  300 mg oral q8h     PRN medications   Medication    acetaminophen    morphine    oxyCODONE-acetaminophen    promethazine    vancomycin     Continuous  Medications   Medication Dose Last Rate     Outpatient Medications:  Current Outpatient Medications   Medication Instructions    atorvastatin (LIPITOR) 40 mg, oral, Daily    calcitriol (ROCALTROL) 0.5 mcg, oral, Daily    cloNIDine (CATAPRES) 0.1 mg, oral, FOUR TIMES DAILY ON DIALYSIS DAYS AND 3 TABLETS ON NON TREATMENT DAYS<BR>    ergocalciferol (Vitamin D-2) 1.25 MG (05375 UT) capsule 1 capsule, oral, Weekly    eszopiclone (LUNESTA) 1 mg, oral, Nightly    hydrALAZINE (APRESOLINE) 50 mg, oral, 3 times daily    levETIRAcetam (KEPPRA) 500 mg, oral, Nightly    metoprolol succinate XL (Toprol-XL) 50 mg 24 hr tablet TAKE 1 TABLET BY MOUTH TWICE DAILY    pregabalin (Lyrica) 25 mg capsule oral, 2 times daily, at 7AM and 10PM    promethazine (PHENERGAN) 25 mg, oral, Daily PRN    sodium zirconium cyclosilicate (Lokelma) 10 gram packet DISSOLVE 1 PACKET INTO 45ML OF WATER TAKE DAILY BEFORE DINNER. ADMINSTER OTHER MEDICTIONS AT LEAST 2 HOURS BEFORE OR 2 HOURS AFTER LOKELMA       Physical Exam  Cardiovascular:      Rate and Rhythm: Normal rate and regular rhythm.      Heart sounds: Murmur heard.      Systolic murmur is present.   Pulmonary:      Effort: Pulmonary effort is normal.      Breath sounds: Normal breath sounds. No wheezing, rhonchi or rales.   Abdominal:      General: Bowel sounds are normal.   Musculoskeletal:      Right lower leg: No edema.      Left lower leg: No edema.   Skin:     General: Skin is warm and dry.   Neurological:      Mental Status: She is alert and oriented to person, place, and time.           Assessment/Plan   Hx of Endocarditis: with bioprosthetic valve replacements  Staph Aureus Bacteremia  Hyperkalemia  End Stage Renal Disease on Dialysis  Hypertensive Disorder  Depression  Coronary Artery Disease    Impression and Plan: 1/10 Presented the emergency department with concerns for displacement of her dialysis permacath.  Patient reports some pain at the site of the catheter but denies any other  symptoms.  Chest x-ray in the emergency department shows no acute cardiopulmonary processes.  Her EKG was a normal sinus rhythm with PVCs and no acute T wave or ST changes.  Blood cultures were drawn in the emergency department which were positive x 2 for gram-positive cocci.  Patient was started on vancomycin.  Patient does have a history of MRSA septicemia and was planned to be started on maintenance antibiotics however patient stopped taking her rifampin several weeks ago.  At the time of my exam patient is alert and oriented.  She is breathing comfortably on room air with pulse oximetry of 100%.  She sinus rhythm on telemetry without significant ectopy.  Blood pressures are low normal at 104/41.  Labs morning shows sodium 134, potassium 5.0, creatinine 4.60.  White blood cells are normal at 6.1.  Patient appears euvolemic on examination.  We were consulted to see the patient with concern for endocarditis and need for repeat LAURA.  Patient did have a recent LAURA in September 2023 when she presented to the hospital with similar symptoms.  This LAURA showed a normal ejection fraction of 55 to 60%.  Vegetation was noted on the mitral valve.  No aortic valve vegetation was visualized at that time. I will discuss the possibility of LAURA this admission further with Dr. Skaggs. I have also ordered a transthoracic echocardiogram to be completed today. At this time patient is refusing repeat valve replacements. Will continue to follow.     Peripheral IV 01/08/24 20 G Proximal;Right Forearm (Active)   Site Assessment Clean;Dry;Intact 01/10/24 0734   Dressing Status Clean;Dry;Occlusive 01/10/24 0734   Number of days: 2       Hemodialysis Cath Left Tunneled catheter Subclavian (Active)   Line Necessity Hemodialysis 01/10/24 0800   Site Assessment Clean;Dry;Intact 01/10/24 0800   Dressing Status Clean;Dry;Occlusive 01/10/24 0800   Number of days: 2       Code Status:  Full Code    I spent 60 minutes in the professional and  overall care of this patient.        Jennfier Graham, APRN-CNP

## 2024-01-10 NOTE — ED NOTES
Report called to floor. Will transport patient up to room.      Becky Cervantes RN  01/09/24 3902

## 2024-01-10 NOTE — PROGRESS NOTES
Batsheva Page is a 48 y.o. female on day 0 of admission presenting with Hyperkalemia.      Subjective   Patient reports she is feeling okay. Says benadryl is helping her itching. Pain well controlled. Very worried about her infection and what needs to be done next.       Objective     Last Recorded Vitals  BP (!) 114/44 (BP Location: Left leg, Patient Position: Lying)   Pulse 68   Temp 36.5 °C (97.7 °F) (Temporal)   Resp 18   Wt 64.4 kg (141 lb 15.6 oz)   SpO2 98%   Intake/Output last 3 Shifts:    Intake/Output Summary (Last 24 hours) at 1/10/2024 1508  Last data filed at 1/10/2024 1244  Gross per 24 hour   Intake 480 ml   Output --   Net 480 ml         Admission Weight  Weight: 65 kg (143 lb 4.8 oz) (01/08/24 0548)    Daily Weight  01/09/24 : 64.4 kg (141 lb 15.6 oz)    Image Results  ECG 12 lead  Sinus rhythm with Premature supraventricular complexes  Otherwise normal ECG  When compared with ECG of 01-JAN-2023 10:57,  Nonspecific T wave abnormality no longer evident in Inferior leads  Nonspecific T wave abnormality no longer evident in Lateral leads  Confirmed by Tico Marie ([id number]) on 1/8/2024 10:28:06 PM  XR chest 2 views  Narrative: STUDY:  Chest Radiographs;  [INSERT month/day/year xx/x/xxxx and Time x:xx  using AM or PM)]  INDICATION:  Portacath dislodgement, pain  COMPARISON:  9/12/2023 XR Chest  ACCESSION NUMBER(S):  [id number]  ORDERING CLINICIAN:  SILVANO LOVE  TECHNIQUE:  Frontal and lateral chest.   FINDINGS:  Both lungs are clear. The heart and mediastinum are of normal size and  contour.  No pleural effusion.  No pneumothorax. No acute bony  abnormality identified. Cerclage wires within the sternum. Aortic  valve replacement. LEFT dialysis catheter tips RIGHT atrium. Vascular  stents bilateral axilla. Retained pericardial leads.  Impression: LEFT dialysis catheter tips RIGHT atrium.  No findings of an acute cardiopulmonary process.  Signed by Washington Goldstein MD      Physical  Exam  General: alert, no diaphoresis   Lungs: CTA BL   Heart: RRR, +murmur ,no LE edema BL   GI: abdomen soft, nontender, nondistended, BS present   MSK: no joint effusion or deformity   Skin: scabs noted on BL legs and pitting scars noted on UE and LE    Neuro: grossly normal cognition, motor strength, sensation    Relevant Results             Results for orders placed or performed during the hospital encounter of 01/08/24 (from the past 24 hour(s))   Potassium   Result Value Ref Range    Potassium 5.0 3.4 - 5.1 mmol/L   Basic Metabolic Panel   Result Value Ref Range    Glucose 93 65 - 99 mg/dL    Sodium 134 133 - 145 mmol/L    Potassium 5.0 3.4 - 5.1 mmol/L    Chloride 96 (L) 97 - 107 mmol/L    Bicarbonate 23 (L) 24 - 31 mmol/L    Urea Nitrogen 22 8 - 25 mg/dL    Creatinine 4.60 (H) 0.40 - 1.60 mg/dL    eGFR 11 (L) >60 mL/min/1.73m*2    Calcium 8.1 (L) 8.5 - 10.4 mg/dL    Anion Gap 15 <=19 mmol/L   CBC   Result Value Ref Range    WBC 6.1 4.4 - 11.3 x10*3/uL    nRBC 0.0 0.0 - 0.0 /100 WBCs    RBC 2.94 (L) 4.00 - 5.20 x10*6/uL    Hemoglobin 6.7 (L) 12.0 - 16.0 g/dL    Hematocrit 22.9 (L) 36.0 - 46.0 %    MCV 78 (L) 80 - 100 fL    MCH 22.8 (L) 26.0 - 34.0 pg    MCHC 29.3 (L) 32.0 - 36.0 g/dL    RDW 17.3 (H) 11.5 - 14.5 %    Platelets 194 150 - 450 x10*3/uL   Vancomycin   Result Value Ref Range    Vancomycin 40.8 (H) 10.0 - 20.0 ug/mL   Prepare RBC: 1 Units   Result Value Ref Range    PRODUCT CODE S9198U11     Unit Number [id number]     Unit ABO A     Unit RH NEG     XM INTEP COMP     Dispense Status XM     Blood Expiration Date January 24, 2024 23:59 EST     PRODUCT BLOOD TYPE 0600     UNIT VOLUME 350    Type and screen   Result Value Ref Range    ABO TYPE A     Rh TYPE NEG     ANTIBODY SCREEN NEG    Transthoracic Echo (TTE) Complete   Result Value Ref Range    BSA 1.72 m2     Scheduled medications  aspirin, 81 mg, oral, Daily  atorvastatin, 40 mg, oral, Nightly  buPROPion, 100 mg, oral, Every other  day  calcitriol, 0.5 mcg, oral, Daily  cloNIDine, 0.1 mg, oral, q8h KIMBERLY  epoetin brandy or biosimilar, 100 Units/kg, subcutaneous, Once per day on Mon Wed Fri  heparin (porcine), 5,000 Units, subcutaneous, q8h KIMBERLY  heparin, 1,000 Units, intra-catheter, After Dialysis  heparin, 1,000 Units, intra-catheter, After Dialysis  hydrALAZINE, 50 mg, oral, TID  levETIRAcetam, 500 mg, oral, Nightly  metoprolol succinate XL, 50 mg, oral, BID  pregabalin, 25 mg, oral, BID  rifAMPin, 300 mg, oral, q8h  sulfur hexafluoride microsphr, 2 mL, intravenous, Once      Continuous medications     PRN medications  PRN medications: acetaminophen, diphenhydrAMINE, morphine, oxyCODONE-acetaminophen, promethazine, vancomycin      Assessment/Plan                  Principal Problem:    Hyperkalemia  Active Problems:    End-stage renal disease on hemodialysis (CMS/[facility])      MRSA bacteremia  - positive cultures 1/8 and 1/9  - concern for relapse of prosthetic valve endocarditis or recurrent infection of dialysis catheter, or both  - Dr. Soto has added rifampin. On vancomycin  - TTE pending. Discussed with Dr. Skaggs. Patient previously refused repeat prosthetic valve surgery during last episode of endocarditis, although she also admits to needing more information to make a decision about what to do next during this hospital stay  - permacath infected, will need changed out but timing complicated by her poor vascular access history. She has been told previously she has no other sites to use and if we were to pull this permacath, it would likely need exchanged over guidewire- however this does not allow good line clearance to try to clear blood cultures in meantime. Patient has had issues with PD in past, although unclear if candidate at this time. Timing of permacath replacement ongoing discussion between nephrology, ID, and vascular surgery/IR.    Hyperkalemia  ESRD  Dislodged permacath  - Vascular surgery consulted, they stabilized the line and  said okay to use. Recommend IR placing new HD line  - Got tired of the Staph aureus bacteremia patient hyperkalemic again today.  She had another stat dialysis this morning.  Concernedly her blood cultures are positive and she has very limited access sites.  We will have to time appropriately when and if we can get the permacath out and changed to a new line.       H/o endocarditis-- see above  - with bioprosthetic valves, recently abx stopped. Normally follows with Dr. Soto.   - will send a set of surveillance blood cultures now-- patient with no signs/symptoms of bacteremia/endocarditis at the moment     HTN  - continue home meds     Depression  - continue buproprion     CAD  - statin, aspirin, beta blocker     Chronic pain  - continue patient's normal pain meds    Itching  - benadryl PRN     Acute on chronic anemia of chronic renal disease  - 1 unit prbc with HD tomorrow    DVT ppx       TTE today       Kinza Weiner DO

## 2024-01-10 NOTE — PROGRESS NOTES
"Batsheva Page is a 48 y.o. female on day 0 of admission presenting with Hyperkalemia.      Subjective   Patient with no complaints, hemoglobin is down to 6.7 however she denies any bleeding or black stools.       Objective          Vitals 24HR  Heart Rate:  [61-78]   Temp:  [35.6 °C (96.1 °F)-37.4 °C (99.3 °F)]   Resp:  [12-18]   BP: (102-128)/(23-75)   Height:  [165.1 cm (5' 5\")]   Weight:  [64.4 kg (141 lb 15.6 oz)]   SpO2:  [96 %-100 %]       Intake/Output last 3 Shifts:    Intake/Output Summary (Last 24 hours) at 1/10/2024 1121  Last data filed at 1/10/2024 0906  Gross per 24 hour   Intake 1240 ml   Output 2349 ml   Net -1109 ml       Physical Exam  Constitutional:       General: She is awake. She is not in acute distress.  Neck:      Comments: Left IJ tunneled dialysis catheter with no erythema or drainage  Cardiovascular:      Rate and Rhythm: Regular rhythm.      Heart sounds:      No friction rub.   Pulmonary:      Effort: Pulmonary effort is normal.      Breath sounds: Normal breath sounds.   Abdominal:      General: Bowel sounds are normal.      Palpations: Abdomen is soft.      Tenderness: There is no guarding or rebound.   Musculoskeletal:      Right lower leg: No edema.      Left lower leg: No edema.   Neurological:      Mental Status: She is alert.         Relevant Results  Results for orders placed or performed during the hospital encounter of 01/08/24 (from the past 24 hour(s))   Blood Culture    Specimen: Peripheral Venipuncture; Blood culture   Result Value Ref Range    Blood Culture       Identification and susceptibility testing to follow    Gram Stain Gram positive cocci, clusters (AA)    Blood Culture    Specimen: Peripheral Venipuncture; Blood culture   Result Value Ref Range    Blood Culture       Identification and susceptibility testing to follow    Gram Stain Gram positive cocci, clusters (AA)    Potassium   Result Value Ref Range    Potassium 5.0 3.4 - 5.1 mmol/L   Basic Metabolic " Panel   Result Value Ref Range    Glucose 93 65 - 99 mg/dL    Sodium 134 133 - 145 mmol/L    Potassium 5.0 3.4 - 5.1 mmol/L    Chloride 96 (L) 97 - 107 mmol/L    Bicarbonate 23 (L) 24 - 31 mmol/L    Urea Nitrogen 22 8 - 25 mg/dL    Creatinine 4.60 (H) 0.40 - 1.60 mg/dL    eGFR 11 (L) >60 mL/min/1.73m*2    Calcium 8.1 (L) 8.5 - 10.4 mg/dL    Anion Gap 15 <=19 mmol/L   CBC   Result Value Ref Range    WBC 6.1 4.4 - 11.3 x10*3/uL    nRBC 0.0 0.0 - 0.0 /100 WBCs    RBC 2.94 (L) 4.00 - 5.20 x10*6/uL    Hemoglobin 6.7 (L) 12.0 - 16.0 g/dL    Hematocrit 22.9 (L) 36.0 - 46.0 %    MCV 78 (L) 80 - 100 fL    MCH 22.8 (L) 26.0 - 34.0 pg    MCHC 29.3 (L) 32.0 - 36.0 g/dL    RDW 17.3 (H) 11.5 - 14.5 %    Platelets 194 150 - 450 x10*3/uL   Vancomycin   Result Value Ref Range    Vancomycin 40.8 (H) 10.0 - 20.0 ug/mL            Assessment/Plan     End-stage kidney disease  Hyperkalemia  Malfunctioning dialysis catheter  Hypertension  Anemia of chronic kidney disease  History of recurrent endocarditis     Plan:  Next dialysis tomorrow, will transfuse 1 unit of blood and start Epogen.  Infectious disease on board  Antibiotics per infectious disease, will likely need exchange of dialysis catheter, follow-up on ID recommendations    Vu Pedersen MD

## 2024-01-10 NOTE — PROGRESS NOTES
"INFECTIOUS DISEASES PROGRESS NOTE    Consulted / following patient for:  Recurrent Staph aureus septicemia    Subjective   Interval History:   Feels okay, no acute complaints  She recognizes that the current dialysis catheter must be removed, that there is a risk of continuing or worsening prosthetic valve endocarditis and that this could be life-threatening and could optimally require surgical intervention.  She has previously stated that she is categorically opposed to having any further valvular surgery but today she seems a little more equivocal.  She is also willing to consider peritoneal dialysis as an option, although she had what she describes as \"life threatening\" complications of peritoneal dialysis previously.  Tolerating oral rifampin without difficulty       Objective   PHYSICAL EXAMINATION  Vital signs:  Visit Vitals  BP (!) 104/41 (BP Location: Left arm, Patient Position: Lying)   Pulse 62   Temp 36.4 °C (97.5 °F) (Temporal)   Resp 18      General: Alert, not toxic, no acute distress  HEENT:  No scleral icterus or conjunctival suffusion, oral mucosa moist  Lungs:  Clear to auscultation  Heart:  S1, S2 normal, no change in systolic murmur  Abdomen:  Soft, nontender. No palpable organs or masses  Skin and mucosa: No peripheral signs of infectious endocarditis      Relevant Results  WBC: 6100     Results from last 72 hours   Lab Units 01/10/24  0429   CREATININE mg/dL 4.60*   ANION GAP mmol/L 15   EGFR mL/min/1.73m*2 11*     Results from last 72 hours   Lab Units 01/08/24  0654   AST U/L 14   ALT U/L 6   ALK PHOS U/L 70   BILIRUBIN TOTAL mg/dL 0.3     Microbiology:  Blood (1/8): MRSA X2  Blood (1/9): GPC clusters X2    Imaging:  TTE: Pending      ASSESSMENT:  MRSA bacteremia  Patient has recurrent MRSA septicemia which either represents a relapse of her prosthetic valve endocarditis, recurrent infection of her dialysis catheter, or both.  There is no evidence for heart failure or peripheral embolic " disease and she is hemodynamically stable.  The Interventionalist has made the statement in the past that this catheter will have to be changed over a wire, which is obviously not optimal, and I defer to Nephrology and Interventional to discuss that further.  The possible option of switching to peritoneal dialysis also needs to be given serious consideration.  Finally, although the patient has stated categorically in the past that she would never consider further cardiac valvular surgery, today she is recognizing that she might have to re-think that position if we discover that she has recurrent prosthetic valve endocarditis     Prosthetic mitral valve endocarditis, September 2023  See above.  Rule out recurrence     Chronic renal failure  Patient is running out of viable options for placement of new dialysis catheters, and placement of an AV fistula is not a possibility.  Discussed in detail with Dr. AURE Pedersen        PLANS:  -   Vancomycin on board, being dosed by pharmacy  -   Continue rifampin 300 mg p.o. every 8 hours  -   Await TTE  -   Await dialysis catheter removal  -   Await further consideration of the option of peritoneal dialysis       Adama Soto MD  [facility]  Office:  [phone]

## 2024-01-10 NOTE — NURSING NOTE
Informed pt that she has an order for 1 unit blood transfusion. Pt stated that she wants to wait until tomorrow during dialysis. She states that Dr Pedersen also told her that was the plan. She is not willing to have blood transfusion today.

## 2024-01-10 NOTE — PROGRESS NOTES
Vancomycin Dosing by Pharmacy- FOLLOW-UP (HEMODIALYSIS)    aBtsheva Page is a 48 y.o. year old female who Pharmacy has been consulted for vancomycin dosing for Vancomycin Indications: Endocarditis. Based on the patient's indication and renal status this patient will be dosed based on a Pre-HD level of 20-25 mcg/mL.     Patient is currently on hemodialysis.    1/10 AM vancomycin level = 40.8      Visit Vitals  BP (!) 102/23 (BP Location: Left leg, Patient Position: Lying)   Pulse 61   Temp 36.5 °C (97.7 °F) (Temporal)   Resp 18           Lab Results   Component Value Date    CREATININE 4.60 (H) 01/10/2024    CREATININE 5.90 (H) 01/09/2024    CREATININE 10.70 (H) 01/08/2024    CREATININE 10.30 (H) 01/08/2024       I/O last 3 completed shifts:  In: 1600 (24.8 mL/kg) [I.V.:1200 (18.6 mL/kg); Other:400]  Out: 2349 (36.5 mL/kg) [Other:2349]  Weight: 64.4 kg     Assessment/Plan     Above goal random/trough level. Vancomycin will be held until next random level is obtained.  Level is above goal, random level with AM labs 1/10 will be obtained.   Will continue to monitor renal function daily while on vancomycin and order serum creatinine at least every 48 hours if not already ordered.  Follow for continued vancomycin needs, clinical response, and signs/symptoms of toxicity.     Radha Fallon, PharmD

## 2024-01-10 NOTE — PROGRESS NOTES
[facility] spoke with the pt at bedside. Pt lives with her friend in a one story house, she is independent for mobility, wears glasses. Pt does not drive, states her family provides transportation. Pt receives dialysis at [facility] M,W,F at 5am. Pt states she has a hx of a little anxiety however states it is under control. Denies smoking or drinking alcohol. Pt states she utilizes PCP services through her nephrologist Dr Zhou Pedersen, prescriptions filled through [facility] in [place]. Discussion around dc planning with pt denying any needs at dc.      01/10/24 4330   Discharge Planning   Patient expects to be discharged to: Home

## 2024-01-10 NOTE — CARE PLAN
The patient's goals for the shift include none    The clinical goals for the shift include monitor labs    Over the shift, the patient did not make progress toward the following goals. Barriers to progression include NA, pt new admission, potassium level improved since arrival. Recommendations to address these barriers include renal diet per order.

## 2024-01-10 NOTE — NURSING NOTE
Pt refuses several scheduled medications - see MAR. Pt also refuses available pain medication, states only morphine helps.

## 2024-01-10 NOTE — CARE PLAN
The patient's goals for the shift include none    The clinical goals for the shift include monitor labs    Over the shift, the patient did not make progress toward the following goals. Barriers to progression include n/a. Recommendations to address these barriers include n/a.

## 2024-01-11 ENCOUNTER — APPOINTMENT (OUTPATIENT)
Dept: DIALYSIS | Facility: HOSPITAL | Age: 49
End: 2024-01-11
Payer: MEDICARE

## 2024-01-11 LAB
ANION GAP SERPL CALC-SCNC: >19 MMOL/L
AORTIC VALVE MEAN GRADIENT: 19
AORTIC VALVE PEAK VELOCITY: 2.88
AV PEAK GRADIENT: 33.2
AVA (PEAK VEL): 1.22
AVA (VTI): 1.06
BACTERIA BLD AEROBE CULT: ABNORMAL
BACTERIA BLD CULT: ABNORMAL
BUN SERPL-MCNC: 36 MG/DL (ref 8–25)
CALCIUM SERPL-MCNC: 8.5 MG/DL (ref 8.5–10.4)
CHLORIDE SERPL-SCNC: 93 MMOL/L (ref 97–107)
CO2 SERPL-SCNC: 20 MMOL/L (ref 24–31)
CREAT SERPL-MCNC: 6.4 MG/DL (ref 0.4–1.6)
EGFRCR SERPLBLD CKD-EPI 2021: 7 ML/MIN/1.73M*2
EJECTION FRACTION APICAL 4 CHAMBER: 60.9
EJECTION FRACTION: 63
ERYTHROCYTE [DISTWIDTH] IN BLOOD BY AUTOMATED COUNT: 17.2 % (ref 11.5–14.5)
GLUCOSE SERPL-MCNC: 88 MG/DL (ref 65–99)
GRAM STN SPEC: ABNORMAL
HCT VFR BLD AUTO: 28.1 % (ref 36–46)
HGB BLD-MCNC: 8.3 G/DL (ref 12–16)
LEFT VENTRICLE INTERNAL DIMENSION DIASTOLE: 3.87 (ref 3.5–6)
LEFT VENTRICULAR OUTFLOW TRACT DIAMETER: 2
MCH RBC QN AUTO: 23.1 PG (ref 26–34)
MCHC RBC AUTO-ENTMCNC: 29.5 G/DL (ref 32–36)
MCV RBC AUTO: 78 FL (ref 80–100)
MITRAL VALVE E/A RATIO: 1.85
NRBC BLD-RTO: 0 /100 WBCS (ref 0–0)
PLATELET # BLD AUTO: 200 X10*3/UL (ref 150–450)
POTASSIUM SERPL-SCNC: 5.4 MMOL/L (ref 3.4–5.1)
RBC # BLD AUTO: 3.6 X10*6/UL (ref 4–5.2)
RIGHT VENTRICLE PEAK SYSTOLIC PRESSURE: 35.3
SODIUM SERPL-SCNC: 135 MMOL/L (ref 133–145)
VANCOMYCIN SERPL-MCNC: 26.1 UG/ML (ref 10–20)
VANCOMYCIN SERPL-MCNC: 40.4 UG/ML (ref 10–20)
WBC # BLD AUTO: 7.2 X10*3/UL (ref 4.4–11.3)

## 2024-01-11 PROCEDURE — 80048 BASIC METABOLIC PNL TOTAL CA: CPT | Performed by: HOSPITALIST

## 2024-01-11 PROCEDURE — 2500000002 HC RX 250 W HCPCS SELF ADMINISTERED DRUGS (ALT 637 FOR MEDICARE OP, ALT 636 FOR OP/ED): Performed by: HOSPITALIST

## 2024-01-11 PROCEDURE — 2060000001 HC INTERMEDIATE ICU ROOM DAILY

## 2024-01-11 PROCEDURE — 36415 COLL VENOUS BLD VENIPUNCTURE: CPT | Performed by: INTERNAL MEDICINE

## 2024-01-11 PROCEDURE — 80202 ASSAY OF VANCOMYCIN: CPT | Performed by: INTERNAL MEDICINE

## 2024-01-11 PROCEDURE — 2500000001 HC RX 250 WO HCPCS SELF ADMINISTERED DRUGS (ALT 637 FOR MEDICARE OP): Performed by: HOSPITALIST

## 2024-01-11 PROCEDURE — 2500000004 HC RX 250 GENERAL PHARMACY W/ HCPCS (ALT 636 FOR OP/ED): Performed by: HOSPITALIST

## 2024-01-11 PROCEDURE — 36415 COLL VENOUS BLD VENIPUNCTURE: CPT | Performed by: HOSPITALIST

## 2024-01-11 PROCEDURE — 85027 COMPLETE CBC AUTOMATED: CPT | Performed by: HOSPITALIST

## 2024-01-11 PROCEDURE — 99232 SBSQ HOSP IP/OBS MODERATE 35: CPT | Performed by: NURSE PRACTITIONER

## 2024-01-11 PROCEDURE — 87040 BLOOD CULTURE FOR BACTERIA: CPT | Mod: WESLAB | Performed by: INTERNAL MEDICINE

## 2024-01-11 PROCEDURE — 8010000001 HC DIALYSIS - HEMODIALYSIS PER DAY

## 2024-01-11 PROCEDURE — 2500000004 HC RX 250 GENERAL PHARMACY W/ HCPCS (ALT 636 FOR OP/ED): Performed by: INTERNAL MEDICINE

## 2024-01-11 PROCEDURE — 2500000001 HC RX 250 WO HCPCS SELF ADMINISTERED DRUGS (ALT 637 FOR MEDICARE OP): Performed by: INTERNAL MEDICINE

## 2024-01-11 RX ORDER — ZOLPIDEM TARTRATE 5 MG/1
5 TABLET ORAL NIGHTLY PRN
Status: DISCONTINUED | OUTPATIENT
Start: 2024-01-11 | End: 2024-01-15 | Stop reason: HOSPADM

## 2024-01-11 RX ADMIN — MORPHINE SULFATE 2 MG: 2 INJECTION, SOLUTION INTRAMUSCULAR; INTRAVENOUS at 03:18

## 2024-01-11 RX ADMIN — HYDRALAZINE HYDROCHLORIDE 50 MG: 50 TABLET ORAL at 21:26

## 2024-01-11 RX ADMIN — MORPHINE SULFATE 2 MG: 2 INJECTION, SOLUTION INTRAMUSCULAR; INTRAVENOUS at 16:02

## 2024-01-11 RX ADMIN — LEVETIRACETAM 500 MG: 500 TABLET, FILM COATED ORAL at 21:26

## 2024-01-11 RX ADMIN — METOPROLOL SUCCINATE 50 MG: 50 TABLET, EXTENDED RELEASE ORAL at 21:27

## 2024-01-11 RX ADMIN — PREGABALIN 25 MG: 25 CAPSULE ORAL at 21:27

## 2024-01-11 RX ADMIN — MORPHINE SULFATE 2 MG: 2 INJECTION, SOLUTION INTRAMUSCULAR; INTRAVENOUS at 21:07

## 2024-01-11 RX ADMIN — DIPHENHYDRAMINE HYDROCHLORIDE 25 MG: 50 INJECTION, SOLUTION INTRAMUSCULAR; INTRAVENOUS at 03:18

## 2024-01-11 RX ADMIN — MORPHINE SULFATE 2 MG: 2 INJECTION, SOLUTION INTRAMUSCULAR; INTRAVENOUS at 07:43

## 2024-01-11 RX ADMIN — ATORVASTATIN CALCIUM 40 MG: 40 TABLET, FILM COATED ORAL at 21:26

## 2024-01-11 RX ADMIN — HEPARIN SODIUM 2100 UNITS: 1000 INJECTION INTRAVENOUS; SUBCUTANEOUS at 11:48

## 2024-01-11 RX ADMIN — MORPHINE SULFATE 2 MG: 2 INJECTION, SOLUTION INTRAMUSCULAR; INTRAVENOUS at 11:58

## 2024-01-11 RX ADMIN — RIFAMPIN 300 MG: 300 CAPSULE ORAL at 23:05

## 2024-01-11 RX ADMIN — HEPARIN SODIUM 2100 UNITS: 1000 INJECTION INTRAVENOUS; SUBCUTANEOUS at 11:47

## 2024-01-11 RX ADMIN — CLONIDINE HYDROCHLORIDE 0.1 MG: 0.1 TABLET ORAL at 21:27

## 2024-01-11 RX ADMIN — DIPHENHYDRAMINE HYDROCHLORIDE 25 MG: 50 INJECTION, SOLUTION INTRAMUSCULAR; INTRAVENOUS at 21:08

## 2024-01-11 RX ADMIN — DIPHENHYDRAMINE HYDROCHLORIDE 25 MG: 50 INJECTION, SOLUTION INTRAMUSCULAR; INTRAVENOUS at 11:58

## 2024-01-11 RX ADMIN — CLONIDINE HYDROCHLORIDE 0.1 MG: 0.1 TABLET ORAL at 05:47

## 2024-01-11 RX ADMIN — DIPHENHYDRAMINE HYDROCHLORIDE 25 MG: 50 INJECTION, SOLUTION INTRAMUSCULAR; INTRAVENOUS at 16:02

## 2024-01-11 RX ADMIN — PREGABALIN 25 MG: 25 CAPSULE ORAL at 06:12

## 2024-01-11 RX ADMIN — ZOLPIDEM TARTRATE 5 MG: 5 TABLET ORAL at 23:05

## 2024-01-11 RX ADMIN — DIPHENHYDRAMINE HYDROCHLORIDE 25 MG: 50 INJECTION, SOLUTION INTRAMUSCULAR; INTRAVENOUS at 07:43

## 2024-01-11 RX ADMIN — RIFAMPIN 300 MG: 300 CAPSULE ORAL at 16:02

## 2024-01-11 ASSESSMENT — PAIN SCALES - GENERAL
PAINLEVEL_OUTOF10: 4
PAINLEVEL_OUTOF10: 4
PAINLEVEL_OUTOF10: 9
PAINLEVEL_OUTOF10: 0 - NO PAIN
PAINLEVEL_OUTOF10: 9

## 2024-01-11 ASSESSMENT — PAIN DESCRIPTION - LOCATION: LOCATION: LEG

## 2024-01-11 ASSESSMENT — PAIN - FUNCTIONAL ASSESSMENT
PAIN_FUNCTIONAL_ASSESSMENT: 0-10

## 2024-01-11 NOTE — PROGRESS NOTES
Vancomycin Dosing by Pharmacy- FOLLOW-UP (HEMODIALYSIS)    Batsheva Page is a 48 y.o. year old female who Pharmacy has been consulted for vancomycin dosing for Vancomycin Indications: Endocarditis. Based on the patient's indication and renal status this patient will be dosed based on a pre-HD level of 20-25 mcg/mL.     Patient is currently on hemodialysis.    Vancomycin maintenance dose: 500 mg after each dialysis session     1/11 vanco random level = 40.4    Visit Vitals  BP (!) 113/31 (BP Location: Left leg, Patient Position: Lying)   Pulse 64   Temp 35.8 °C (96.4 °F) (Temporal)   Resp 16           Lab Results   Component Value Date    CREATININE 6.40 (H) 01/11/2024    CREATININE 4.60 (H) 01/10/2024    CREATININE 5.90 (H) 01/09/2024    CREATININE 10.70 (H) 01/08/2024       I/O last 3 completed shifts:  In: 720 (11.2 mL/kg) [P.O.:720]  Out: - (0 mL/kg)   Weight: 64.4 kg     Assessment/Plan     Repeat random vanco level is 40.4, patient having dialysis today, will order to have 500 mg maintenance dose to be given after dialysis and order a pre-HD (AM) level for Saturday (1/13)  Follow for continued vancomycin needs, clinical response, and signs/symptoms of toxicity.     Radha Fallon, PharmD

## 2024-01-11 NOTE — PROGRESS NOTES
INFECTIOUS DISEASES PROGRESS NOTE    Consulted / following patient for:  Recurrent Staph aureus septicemia    Subjective   Interval History:   Feels okay, no acute complaints  She understands the plans for LAURA tomorrow, and is willing to consider repeat cardiac valvular surgery if that should prove necessary, with the understanding that this would be done at [facility] rather than        Objective   PHYSICAL EXAMINATION  Vital signs:  Visit Vitals  BP (!) 136/43 (BP Location: Left leg, Patient Position: Lying)   Pulse 77   Temp 36.5 °C (97.7 °F) (Temporal)   Resp 16      General: Alert, not toxic, no acute distress  HEENT:  No scleral icterus or conjunctival suffusion, oral mucosa moist  Lungs:  Clear to auscultation  Heart:  S1, S2 normal, no change in systolic murmur  Abdomen:  Soft, nontender. No palpable organs or masses  Skin and mucosa: No peripheral signs of infectious endocarditis      Relevant Results  WBC: 7200   Random vancomycin level 1/11: 40  Results from last 72 hours   Lab Units 01/11/24  0427   CREATININE mg/dL 6.40*   ANION GAP mmol/L >19*   EGFR mL/min/1.73m*2 7*           Microbiology:  Blood (1/8): MRSA X2  Blood (1/9): MRSA X2  Blood (1/11, peripheral): Ordered X2 for this evening    Imaging:  TTE: No vegetations  LAURA: Planned for 1/12      ASSESSMENT:  MRSA bacteremia  Patient has recurrent MRSA septicemia which either represents a relapse of her prosthetic valve endocarditis, recurrent infection of her dialysis catheter, or both.  There is no evidence for heart failure or peripheral embolic disease and she is hemodynamically stable.  Blood cultures remain positive.  Vascular Surgery has been consulted, and I have discussed in detail with Mr. Fitzgerald, as I believe that it is critical that this dialysis catheter be removed.  The possible option of switching to peritoneal dialysis also needs to be given serious consideration, but until the bloodstream is clear a peritoneal dialysis catheter cannot be  placed.       Prosthetic mitral valve endocarditis, September 2023  See above.  Rule out recurrence     Chronic renal failure  Patient is running out of viable options for placement of new dialysis catheters, and vascular surgery consultation is pending as is ongoing consideration for possible transition to peritoneal dialysis        PLANS:  -   Vancomycin on board, post-dialysis level ordered for 8 PM this evening  -   Continue rifampin 300 mg p.o. every 8 hours  -   Peripheral blood cultures, drawn at separate sites, at 8 PM this evening  -   Await LAURA  -   Await dialysis catheter removal  -   Await further consideration of the option of peritoneal dialysis    Discussed in detail with Dr. LIBIA Pedersen and with Mr. Amilcar Soto MD  [facility] [employer]  Office:  [phone]

## 2024-01-11 NOTE — CARE PLAN
The patient's goals for the shift include none    The clinical goals for the shift include pain management    Over the shift, the patient did not make progress toward the following goals. Barriers to progression include n/a. Recommendations to address these barriers include rest.

## 2024-01-11 NOTE — PROGRESS NOTES
Batsheva Page is a 48 y.o. female on day 1 of admission presenting with Hyperkalemia.      Subjective   Seen in dialysis today. She says she feels okay. Pain well controlled. Benadryl helping with the itching.           Objective     Last Recorded Vitals  BP (!) 113/31 (BP Location: Left leg, Patient Position: Lying)   Pulse 64   Temp 35.8 °C (96.4 °F) (Temporal)   Resp 16   Wt 64.4 kg (141 lb 15.6 oz)   SpO2 99%   Intake/Output last 3 Shifts:    Intake/Output Summary (Last 24 hours) at 1/11/2024 1043  Last data filed at 1/11/2024 1030  Gross per 24 hour   Intake 680 ml   Output --   Net 680 ml         Admission Weight  Weight: 65 kg (143 lb 4.8 oz) (01/08/24 0548)    Daily Weight  01/09/24 : 64.4 kg (141 lb 15.6 oz)    Image Results  ECG 12 lead  Sinus rhythm with Premature supraventricular complexes  Otherwise normal ECG  When compared with ECG of 01-JAN-2023 10:57,  Nonspecific T wave abnormality no longer evident in Inferior leads  Nonspecific T wave abnormality no longer evident in Lateral leads  Confirmed by Tico Marie ([id number]) on 1/8/2024 10:28:06 PM  XR chest 2 views  Narrative: STUDY:  Chest Radiographs;  [INSERT month/day/year xx/x/xxxx and Time x:xx  using AM or PM)]  INDICATION:  Portacath dislodgement, pain  COMPARISON:  9/12/2023 XR Chest  ACCESSION NUMBER(S):  [id number]  ORDERING CLINICIAN:  SILVANO LOVE  TECHNIQUE:  Frontal and lateral chest.   FINDINGS:  Both lungs are clear. The heart and mediastinum are of normal size and  contour.  No pleural effusion.  No pneumothorax. No acute bony  abnormality identified. Cerclage wires within the sternum. Aortic  valve replacement. LEFT dialysis catheter tips RIGHT atrium. Vascular  stents bilateral axilla. Retained pericardial leads.  Impression: LEFT dialysis catheter tips RIGHT atrium.  No findings of an acute cardiopulmonary process.  Signed by Washington Goldstein MD      Physical Exam  General: alert, no diaphoresis   Lungs: CTA BL   Heart:  RRR, +murmur ,no LE edema BL   GI: abdomen soft, nontender, nondistended, BS present   MSK: no joint effusion or deformity   Skin: scabs noted on BL legs and pitting scars noted on UE and LE    Neuro: grossly normal cognition, motor strength, sensation    Relevant Results             Results for orders placed or performed during the hospital encounter of 01/08/24 (from the past 24 hour(s))   Prepare RBC: 1 Units   Result Value Ref Range    PRODUCT CODE U8554S11     Unit Number [id number]     Unit ABO A     Unit RH NEG     XM INTEP COMP     Dispense Status XM     Blood Expiration Date January 24, 2024 23:59 EST     PRODUCT BLOOD TYPE 0600     UNIT VOLUME 350    Type and screen   Result Value Ref Range    ABO TYPE A     Rh TYPE NEG     ANTIBODY SCREEN NEG    Transthoracic Echo (TTE) Complete   Result Value Ref Range    BSA 1.72 m2   Basic Metabolic Panel   Result Value Ref Range    Glucose 88 65 - 99 mg/dL    Sodium 135 133 - 145 mmol/L    Potassium 5.4 (H) 3.4 - 5.1 mmol/L    Chloride 93 (L) 97 - 107 mmol/L    Bicarbonate 20 (L) 24 - 31 mmol/L    Urea Nitrogen 36 (H) 8 - 25 mg/dL    Creatinine 6.40 (H) 0.40 - 1.60 mg/dL    eGFR 7 (L) >60 mL/min/1.73m*2    Calcium 8.5 8.5 - 10.4 mg/dL    Anion Gap >19 (H) <=19 mmol/L   CBC   Result Value Ref Range    WBC 7.2 4.4 - 11.3 x10*3/uL    nRBC 0.0 0.0 - 0.0 /100 WBCs    RBC 3.60 (L) 4.00 - 5.20 x10*6/uL    Hemoglobin 8.3 (L) 12.0 - 16.0 g/dL    Hematocrit 28.1 (L) 36.0 - 46.0 %    MCV 78 (L) 80 - 100 fL    MCH 23.1 (L) 26.0 - 34.0 pg    MCHC 29.5 (L) 32.0 - 36.0 g/dL    RDW 17.2 (H) 11.5 - 14.5 %    Platelets 200 150 - 450 x10*3/uL   Vancomycin   Result Value Ref Range    Vancomycin 40.4 (H) 10.0 - 20.0 ug/mL     Scheduled medications  aspirin, 81 mg, oral, Daily  atorvastatin, 40 mg, oral, Nightly  buPROPion, 100 mg, oral, Every other day  calcitriol, 0.5 mcg, oral, Daily  cloNIDine, 0.1 mg, oral, q8h KIMBERLY  epoetin brandy or biosimilar, 100 Units/kg, subcutaneous, Once per  day on Mon Wed Fri  heparin (porcine), 5,000 Units, subcutaneous, q8h KIMBERLY  heparin, 1,000 Units, intra-catheter, After Dialysis  heparin, 1,000 Units, intra-catheter, After Dialysis  hydrALAZINE, 50 mg, oral, TID  levETIRAcetam, 500 mg, oral, Nightly  metoprolol succinate XL, 50 mg, oral, BID  pregabalin, 25 mg, oral, BID  rifAMPin, 300 mg, oral, q8h  sulfur hexafluoride microsphr, 2 mL, intravenous, Once      Continuous medications     PRN medications  PRN medications: acetaminophen, diphenhydrAMINE, morphine, oxyCODONE-acetaminophen, promethazine      Assessment/Plan                  Principal Problem:    Hyperkalemia  Active Problems:    End-stage renal disease on hemodialysis (CMS/[facility])      MRSA bacteremia  - positive cultures 1/8 and 1/9  - concern for relapse of prosthetic valve endocarditis or recurrent infection of dialysis catheter, or both  - Dr. Soto has added rifampin. On vancomycin IV  - TTE results still pending. Discussed with Dr. Skaggs. Patient has been refusing repeat prosthetic valve surgery   - permacath infected, will need changed out but timing complicated by her poor vascular access history. She has been told previously she has no other sites to use and if we were to pull this permacath, it would likely need exchanged over guidewire- however this does not allow good line clearance to try to clear blood cultures in meantime. Patient has had issues with PD in past, although unclear if candidate at this time. Timing of permacath replacement ongoing discussion between nephrology, ID, and vascular surgery/IR.    Hyperkalemia  ESRD  Dislodged permacath  - Vascular surgery consulted, they stabilized the line and said okay to use. Recommend IR placing new HD line but not sure what will happen give concern for infection of the line  - Got tired of the Staph aureus bacteremia patient hyperkalemic again today.  She had another stat dialysis this morning.  Concernedly her blood cultures are positive and  she has very limited access sites.  We will have to time appropriately when and if we can get the permacath out and changed to a new line.       H/o endocarditis-- see above  - with bioprosthetic valves, recently abx stopped. Normally follows with Dr. Soto.   - will send a set of surveillance blood cultures now-- patient with no signs/symptoms of bacteremia/endocarditis at the moment     HTN  - continue home meds-- add parameters today     Depression  - continue buproprion     CAD  - statin, aspirin, beta blocker     Chronic pain  - continue patient's normal pain meds    Itching  - benadryl PRN     Acute on chronic anemia of chronic renal disease  - s/p 1 unit prbc 1/10 with good improvement of hemoglobin    DVT ppx       TTE today       Kinza Weiner DO

## 2024-01-11 NOTE — NURSING NOTE
Assumed care of pt. Pt sleeping in bed. NAD. No signs of pain per the FLACC scale. RA. BSSR completed

## 2024-01-11 NOTE — PROGRESS NOTES
"Batsheva Page is a 48 y.o. female on day 1 of admission presenting with Hyperkalemia.    Subjective   The patient admitted to hospital with malfunctioning dialysis catheter now she has MRSA in the blood she had LAURA which was negative for vegetations she is on IV antibiotics discussed with the ID this catheter should come out vascular is consulted to see the patient today patient feels fine she was dialyzed yesterday without any issues       Objective     Physical Exam  Neck:      Vascular: No carotid bruit.   Cardiovascular:      Rate and Rhythm: Normal rate and regular rhythm.      Heart sounds: No murmur heard.     No friction rub. No gallop.   Pulmonary:      Breath sounds: No wheezing, rhonchi or rales.   Chest:      Chest wall: No tenderness.   Abdominal:      General: There is no distension.      Tenderness: There is no abdominal tenderness. There is no guarding or rebound.   Musculoskeletal:         General: No swelling or tenderness.      Cervical back: Neck supple.      Right lower leg: No edema.      Left lower leg: No edema.   Lymphadenopathy:      Cervical: No cervical adenopathy.         Last Recorded Vitals  Blood pressure (!) 136/43, pulse 77, temperature 36.5 °C (97.7 °F), temperature source Temporal, resp. rate 16, height 1.651 m (5' 5\"), weight 64.4 kg (141 lb 15.6 oz), SpO2 99 %.    Intake/Output last 3 Shifts:  I/O last 3 completed shifts:  In: 720 (11.2 mL/kg) [P.O.:720]  Out: - (0 mL/kg)   Weight: 64.4 kg     Current Facility-Administered Medications:     acetaminophen (Tylenol) tablet 650 mg, 650 mg, oral, q6h PRN, Kinza Weiner DO    aspirin EC tablet 81 mg, 81 mg, oral, Daily, Kinza Weiner DO, 81 mg at 01/08/24 1142    atorvastatin (Lipitor) tablet 40 mg, 40 mg, oral, Nightly, Kinza Weiner DO, 40 mg at 01/10/24 2049    buPROPion (Wellbutrin) tablet 100 mg, 100 mg, oral, Every other day, Kinza Weiner DO    calcitriol (Rocaltrol) capsule 0.5 mcg, 0.5 mcg, " oral, Daily, Kinza Weiner DO, 0.5 mcg at 01/10/24 0812    cloNIDine (Catapres) tablet 0.1 mg, 0.1 mg, oral, q8h [facility], Kinza Weiner DO, 0.1 mg at 01/11/24 0547    diphenhydrAMINE (BENADryl) injection 25 mg, 25 mg, intravenous, q4h PRN, Kinza Weiner DO, 25 mg at 01/11/24 1158    epoetin brandy-epbx (RETACRIT) injection 6,000 Units, 100 Units/kg, subcutaneous, Once per day on Mon Wed Fri, Vu Pedersen MD, 6,000 Units at 01/10/24 1130    heparin (porcine) injection 5,000 Units, 5,000 Units, subcutaneous, q8h [facility], Kinza Weiner DO    heparin 1,000 unit/mL injection 1,000 Units, 1,000 Units, intra-catheter, After Dialysis, Zhou Pedersen MD, 2,100 Units at 01/11/24 1148    heparin 1,000 unit/mL injection 1,000 Units, 1,000 Units, intra-catheter, After Dialysis, Zhou Pedersen MD, 2,100 Units at 01/11/24 1147    hydrALAZINE (Apresoline) tablet 50 mg, 50 mg, oral, TID, Kinza Weiner DO, 50 mg at 01/10/24 2050    levETIRAcetam (Keppra) tablet 500 mg, 500 mg, oral, Nightly, Kinza Weiner DO, 500 mg at 01/10/24 2049    metoprolol succinate XL (Toprol-XL) 24 hr tablet 50 mg, 50 mg, oral, BID, Kinza Weiner DO, 50 mg at 01/10/24 2049    morphine injection 2 mg, 2 mg, intravenous, q4h PRN, Kinza Weiner DO, 2 mg at 01/11/24 1158    oxyCODONE-acetaminophen (Percocet) 5-325 mg per tablet 1 tablet, 1 tablet, oral, q6h PRN, Kinza Weiner DO    pregabalin (Lyrica) capsule 25 mg, 25 mg, oral, BID, Kinza Weiner DO, 25 mg at 01/11/24 0612    promethazine (Phenergan) 12.5 mg in sodium chloride 0.9% 50 mL IV, 12.5 mg, intravenous, q6h PRN, Kinza Weiner DO, 12.5 mg at 01/08/24 2152    rifAMPin (Rifadin) capsule 300 mg, 300 mg, oral, q8h, Adama Soto MD, 300 mg at 01/10/24 2336    sulfur hexafluoride microsphr (Lumason) injection 24.28 mg, 2 mL, intravenous, Once, Jennifer Graham, APRN-CNP   Relevant Results    Results for orders placed or performed during the hospital  encounter of 01/08/24 (from the past 96 hour(s))   CBC and Auto Differential   Result Value Ref Range    WBC 7.3 4.4 - 11.3 x10*3/uL    nRBC 0.0 0.0 - 0.0 /100 WBCs    RBC 3.28 (L) 4.00 - 5.20 x10*6/uL    Hemoglobin 7.7 (L) 12.0 - 16.0 g/dL    Hematocrit 26.2 (L) 36.0 - 46.0 %    MCV 80 80 - 100 fL    MCH 23.5 (L) 26.0 - 34.0 pg    MCHC 29.4 (L) 32.0 - 36.0 g/dL    RDW 17.3 (H) 11.5 - 14.5 %    Platelets 240 150 - 450 x10*3/uL    Neutrophils % 77.9 40.0 - 80.0 %    Immature Granulocytes %, Automated 0.1 0.0 - 0.9 %    Lymphocytes % 11.4 13.0 - 44.0 %    Monocytes % 5.7 2.0 - 10.0 %    Eosinophils % 4.4 0.0 - 6.0 %    Basophils % 0.5 0.0 - 2.0 %    Neutrophils Absolute 5.71 1.20 - 7.70 x10*3/uL    Immature Granulocytes Absolute, Automated 0.01 0.00 - 0.70 x10*3/uL    Lymphocytes Absolute 0.84 (L) 1.20 - 4.80 x10*3/uL    Monocytes Absolute 0.42 0.10 - 1.00 x10*3/uL    Eosinophils Absolute 0.32 0.00 - 0.70 x10*3/uL    Basophils Absolute 0.04 0.00 - 0.10 x10*3/uL   Comprehensive metabolic panel   Result Value Ref Range    Glucose 101 (H) 65 - 99 mg/dL    Sodium 133 133 - 145 mmol/L    Potassium 6.9 (HH) 3.4 - 5.1 mmol/L    Chloride 88 (L) 97 - 107 mmol/L    Bicarbonate 21 (L) 24 - 31 mmol/L    Urea Nitrogen 56 (H) 8 - 25 mg/dL    Creatinine 10.30 (H) 0.40 - 1.60 mg/dL    eGFR 4 (L) >60 mL/min/1.73m*2    Calcium 7.2 (L) 8.5 - 10.4 mg/dL    Albumin 4.0 3.5 - 5.0 g/dL    Alkaline Phosphatase 70 35 - 125 U/L    Total Protein 8.2 (H) 5.9 - 7.9 g/dL    AST 14 5 - 40 U/L    Bilirubin, Total 0.3 0.1 - 1.2 mg/dL    ALT 6 5 - 40 U/L    Anion Gap >19 (H) <=19 mmol/L   SARS-CoV-2 RT PCR   Result Value Ref Range    Coronavirus 2019, PCR Not Detected Not Detected   ECG 12 lead   Result Value Ref Range    Ventricular Rate 69 BPM    Atrial Rate 69 BPM    WI Interval 176 ms    QRS Duration 72 ms    QT Interval 418 ms    QTC Calculation(Bazett) 447 ms    P Axis 68 degrees    R Axis 16 degrees    T Axis 80 degrees    QRS Count 11 beats     Q Onset 223 ms    P Onset 135 ms    P Offset 186 ms    T Offset 432 ms    QTC Fredericia 438 ms   Blood Culture    Specimen: Peripheral Venipuncture; Blood culture   Result Value Ref Range    Blood Culture Staphylococcus aureus (AA)     BLOOD CULTURE BOTTLE  Positive Anaerobic Bottle     Gram Stain Gram positive cocci, clusters (AA)     Gram Stain Gram positive cocci, clusters (AA)    Blood Culture    Specimen: Peripheral Venipuncture; Blood culture   Result Value Ref Range    Blood Culture Methicillin Resistant Staphylococcus aureus (MRSA) (AA)     BLOOD CULTURE BOTTLE  Positive Aerobic and Anaerobic Bottle     Gram Stain Gram positive cocci, clusters (AA)        Susceptibility    Methicillin Resistant Staphylococcus aureus (MRSA) - MICROSCAN     Clindamycin  Susceptible      Erythromycin  Susceptible      Oxacillin  Resistant      Tetracycline  Susceptible      Trimethoprim/Sulfamethoxazole  Susceptible      Vancomycin  Susceptible    Basic metabolic panel   Result Value Ref Range    Glucose 26 (LL) 65 - 99 mg/dL    Sodium 136 133 - 145 mmol/L    Potassium 5.0 3.4 - 5.1 mmol/L    Chloride 92 (L) 97 - 107 mmol/L    Bicarbonate 21 (L) 24 - 31 mmol/L    Urea Nitrogen 59 (H) 8 - 25 mg/dL    Creatinine 10.70 (H) 0.40 - 1.60 mg/dL    eGFR 4 (L) >60 mL/min/1.73m*2    Calcium 7.0 (L) 8.5 - 10.4 mg/dL    Anion Gap >19 (H) <=19 mmol/L   POCT GLUCOSE   Result Value Ref Range    POCT Glucose 71 (L) 74 - 99 mg/dL   Basic Metabolic Panel   Result Value Ref Range    Glucose 92 65 - 99 mg/dL    Sodium 134 133 - 145 mmol/L    Potassium 6.3 (HH) 3.4 - 5.1 mmol/L    Chloride 94 (L) 97 - 107 mmol/L    Bicarbonate 25 24 - 31 mmol/L    Urea Nitrogen 27 (H) 8 - 25 mg/dL    Creatinine 5.90 (H) 0.40 - 1.60 mg/dL    eGFR 8 (L) >60 mL/min/1.73m*2    Calcium 7.2 (L) 8.5 - 10.4 mg/dL    Anion Gap 15 <=19 mmol/L   CBC   Result Value Ref Range    WBC 6.7 4.4 - 11.3 x10*3/uL    nRBC 0.0 0.0 - 0.0 /100 WBCs    RBC 3.19 (L) 4.00 - 5.20 x10*6/uL     Hemoglobin 7.4 (L) 12.0 - 16.0 g/dL    Hematocrit 24.5 (L) 36.0 - 46.0 %    MCV 77 (L) 80 - 100 fL    MCH 23.2 (L) 26.0 - 34.0 pg    MCHC 30.2 (L) 32.0 - 36.0 g/dL    RDW 17.3 (H) 11.5 - 14.5 %    Platelets 211 150 - 450 x10*3/uL   Blood Culture    Specimen: Peripheral Venipuncture; Blood culture   Result Value Ref Range    Blood Culture Methicillin Resistant Staphylococcus aureus (MRSA) (AA)     BLOOD CULTURE BOTTLE  Positive Aerobic and Anaerobic Bottle     Gram Stain Gram positive cocci, clusters (AA)        Susceptibility    Methicillin Resistant Staphylococcus aureus (MRSA) - MICROSCAN     Clindamycin  Susceptible      Erythromycin  Susceptible      Oxacillin  Resistant      Tetracycline  Susceptible      Trimethoprim/Sulfamethoxazole  Susceptible      Vancomycin  Susceptible    Blood Culture    Specimen: Peripheral Venipuncture; Blood culture   Result Value Ref Range    Blood Culture       Identification and susceptibility testing to follow    Blood Culture Staphylococcus aureus (AA)     BLOOD CULTURE BOTTLE  Positive Aerobic Bottle     Gram Stain Gram positive cocci, clusters (AA)     Gram Stain Gram positive cocci, clusters (AA)    Potassium   Result Value Ref Range    Potassium 5.0 3.4 - 5.1 mmol/L   Basic Metabolic Panel   Result Value Ref Range    Glucose 93 65 - 99 mg/dL    Sodium 134 133 - 145 mmol/L    Potassium 5.0 3.4 - 5.1 mmol/L    Chloride 96 (L) 97 - 107 mmol/L    Bicarbonate 23 (L) 24 - 31 mmol/L    Urea Nitrogen 22 8 - 25 mg/dL    Creatinine 4.60 (H) 0.40 - 1.60 mg/dL    eGFR 11 (L) >60 mL/min/1.73m*2    Calcium 8.1 (L) 8.5 - 10.4 mg/dL    Anion Gap 15 <=19 mmol/L   CBC   Result Value Ref Range    WBC 6.1 4.4 - 11.3 x10*3/uL    nRBC 0.0 0.0 - 0.0 /100 WBCs    RBC 2.94 (L) 4.00 - 5.20 x10*6/uL    Hemoglobin 6.7 (L) 12.0 - 16.0 g/dL    Hematocrit 22.9 (L) 36.0 - 46.0 %    MCV 78 (L) 80 - 100 fL    MCH 22.8 (L) 26.0 - 34.0 pg    MCHC 29.3 (L) 32.0 - 36.0 g/dL    RDW 17.3 (H) 11.5 - 14.5 %     Platelets 194 150 - 450 x10*3/uL   Vancomycin   Result Value Ref Range    Vancomycin 40.8 (H) 10.0 - 20.0 ug/mL   Prepare RBC: 1 Units   Result Value Ref Range    PRODUCT CODE [id number]     Unit Number [id number]     Unit ABO A     Unit RH NEG     XM INTEP COMP     Dispense Status XM     Blood Expiration Date January 24, 2024 23:59 EST     PRODUCT BLOOD TYPE 0600     UNIT VOLUME 350    Type and screen   Result Value Ref Range    ABO TYPE A     Rh TYPE NEG     ANTIBODY SCREEN NEG    Transthoracic Echo (TTE) Complete   Result Value Ref Range    AV pk peggy 2.88     LVOT diam 2.00     AV mn grad 19.0     MV E/A ratio 1.85     LV biplane EF 63     RVSP 35.3     LVIDd 3.87     AV pk grad 33.2     Aortic Valve Area by Continuity of VTI 1.06     Aortic Valve Area by Continuity of Peak Velocity 1.22     LV A4C EF 60.9    Basic Metabolic Panel   Result Value Ref Range    Glucose 88 65 - 99 mg/dL    Sodium 135 133 - 145 mmol/L    Potassium 5.4 (H) 3.4 - 5.1 mmol/L    Chloride 93 (L) 97 - 107 mmol/L    Bicarbonate 20 (L) 24 - 31 mmol/L    Urea Nitrogen 36 (H) 8 - 25 mg/dL    Creatinine 6.40 (H) 0.40 - 1.60 mg/dL    eGFR 7 (L) >60 mL/min/1.73m*2    Calcium 8.5 8.5 - 10.4 mg/dL    Anion Gap >19 (H) <=19 mmol/L   CBC   Result Value Ref Range    WBC 7.2 4.4 - 11.3 x10*3/uL    nRBC 0.0 0.0 - 0.0 /100 WBCs    RBC 3.60 (L) 4.00 - 5.20 x10*6/uL    Hemoglobin 8.3 (L) 12.0 - 16.0 g/dL    Hematocrit 28.1 (L) 36.0 - 46.0 %    MCV 78 (L) 80 - 100 fL    MCH 23.1 (L) 26.0 - 34.0 pg    MCHC 29.5 (L) 32.0 - 36.0 g/dL    RDW 17.2 (H) 11.5 - 14.5 %    Platelets 200 150 - 450 x10*3/uL   Vancomycin   Result Value Ref Range    Vancomycin 40.4 (H) 10.0 - 20.0 ug/mL     Impression and plan;    End-stage kidney disease   MRSA bacteremia secondary to line infection  Hyperkalemia  Malfunctioning dialysis catheter  Hypertension  Anemia of chronic kidney disease  History of recurrent endocarditis    Plan:   continue antibiotics per infectious disease,  vascular consult is obtained to remove this dialysis catheter                    Zhou Pedersen MD

## 2024-01-11 NOTE — PROGRESS NOTES
"Batsheva Page is a 48 y.o. female on day 2 of admission presenting with Hyperkalemia.    Subjective   Alert and oriented x 3.  Denies complaints chest pain or pressure, palpitations or feeling rapid heart rate       Objective     Physical Exam  Vitals and nursing note reviewed.   Constitutional:       General: She is not in acute distress.     Appearance: She is ill-appearing. She is not toxic-appearing.   HENT:      Head: Normocephalic and atraumatic.      Nose: Nose normal.      Mouth/Throat:      Mouth: Mucous membranes are moist.   Cardiovascular:      Rate and Rhythm: Normal rate and regular rhythm.      Heart sounds: Murmur heard.      Systolic murmur is present with a grade of 1/6.   Pulmonary:      Effort: Pulmonary effort is normal.      Breath sounds: Normal breath sounds. No wheezing, rhonchi or rales.   Abdominal:      General: Abdomen is flat. Bowel sounds are normal.      Palpations: Abdomen is soft.   Musculoskeletal:      Right lower leg: No edema.      Left lower leg: No edema.   Skin:     General: Skin is warm.      Capillary Refill: Capillary refill takes less than 2 seconds.   Neurological:      Mental Status: She is alert and oriented to person, place, and time. Mental status is at baseline.   Psychiatric:         Mood and Affect: Mood normal.         Behavior: Behavior normal.         Thought Content: Thought content normal.         Judgment: Judgment normal.         Last Recorded Vitals  Blood pressure (!) 113/31, pulse 64, temperature 35.8 °C (96.4 °F), temperature source Temporal, resp. rate 16, height 1.651 m (5' 5\"), weight 64.4 kg (141 lb 15.6 oz), SpO2 99 %.  Intake/Output last 3 Shifts:  I/O last 3 completed shifts:  In: 720 (11.2 mL/kg) [P.O.:720]  Out: - (0 mL/kg)   Weight: 64.4 kg     Relevant Results  Results for orders placed or performed during the hospital encounter of 01/08/24 (from the past 24 hour(s))   Prepare RBC: 1 Units   Result Value Ref Range    PRODUCT CODE " [id number]     Unit Number [id number]     Unit ABO A     Unit RH NEG     XM INTEP COMP     Dispense Status XM     Blood Expiration Date January 24, 2024 23:59 EST     PRODUCT BLOOD TYPE 0600     UNIT VOLUME 350    Type and screen   Result Value Ref Range    ABO TYPE A     Rh TYPE NEG     ANTIBODY SCREEN NEG    Transthoracic Echo (TTE) Complete   Result Value Ref Range    BSA 1.72 m2   Basic Metabolic Panel   Result Value Ref Range    Glucose 88 65 - 99 mg/dL    Sodium 135 133 - 145 mmol/L    Potassium 5.4 (H) 3.4 - 5.1 mmol/L    Chloride 93 (L) 97 - 107 mmol/L    Bicarbonate 20 (L) 24 - 31 mmol/L    Urea Nitrogen 36 (H) 8 - 25 mg/dL    Creatinine 6.40 (H) 0.40 - 1.60 mg/dL    eGFR 7 (L) >60 mL/min/1.73m*2    Calcium 8.5 8.5 - 10.4 mg/dL    Anion Gap >19 (H) <=19 mmol/L   CBC   Result Value Ref Range    WBC 7.2 4.4 - 11.3 x10*3/uL    nRBC 0.0 0.0 - 0.0 /100 WBCs    RBC 3.60 (L) 4.00 - 5.20 x10*6/uL    Hemoglobin 8.3 (L) 12.0 - 16.0 g/dL    Hematocrit 28.1 (L) 36.0 - 46.0 %    MCV 78 (L) 80 - 100 fL    MCH 23.1 (L) 26.0 - 34.0 pg    MCHC 29.5 (L) 32.0 - 36.0 g/dL    RDW 17.2 (H) 11.5 - 14.5 %    Platelets 200 150 - 450 x10*3/uL   Vancomycin   Result Value Ref Range    Vancomycin 40.4 (H) 10.0 - 20.0 ug/mL       Assessment/Plan   Principal Problem:    Hyperkalemia  Active Problems:    End-stage renal disease on hemodialysis (CMS/HCC)    Hx of Endocarditis: with bioprosthetic valve replacements  Staph Aureus Bacteremia  Hyperkalemia  End Stage Renal Disease on Dialysis  Hypertensive Disorder  Depression  Coronary Artery Disease     Impression and Plan:     1/10: Presented the emergency department with concerns for displacement of her dialysis permacath.  Patient reports some pain at the site of the catheter but denies any other symptoms.  Chest x-ray in the emergency department shows no acute cardiopulmonary processes.  Her EKG was a normal sinus rhythm with PVCs and no acute T wave or ST changes.  Blood cultures  were drawn in the emergency department which were positive x 2 for gram-positive cocci.  Patient was started on vancomycin.  Patient does have a history of MRSA septicemia and was planned to be started on maintenance antibiotics however patient stopped taking her rifampin several weeks ago.  At the time of my exam patient is alert and oriented.  She is breathing comfortably on room air with pulse oximetry of 100%.  She sinus rhythm on telemetry without significant ectopy.  Blood pressures are low normal at 104/41.  Labs morning shows sodium 134, potassium 5.0, creatinine 4.60.  White blood cells are normal at 6.1.  Patient appears euvolemic on examination.  We were consulted to see the patient with concern for endocarditis and need for repeat LAURA.  Patient did have a recent LAURA in September 2023 when she presented to the hospital with similar symptoms.  This LAURA showed a normal ejection fraction of 55 to 60%.  Vegetation was noted on the mitral valve.  No aortic valve vegetation was visualized at that time. I will discuss the possibility of LAURA this admission further with Dr. Skaggs. I have also ordered a transthoracic echocardiogram to be completed today. At this time patient is refusing repeat valve replacements. Will continue to follow.     1/11: No significant changes over past 24 hours.  Patient was assessed at dialysis this morning.  He is chest pain-free and euvolemic on examination.  Breathing comfortably on room air.  Did undergo transthoracic echo yesterday, will be interpreted by cardiology team this morning.  Her blood pressure has remained stable with most recent 113/31.  Creatinine has risen to 6.4 and she is currently undergoing dialysis now.  Remains  anemic but stable with current hemoglobin of 8.3.  She continues on IV vancomycin as directed by infectious disease.  Dr. Skaggs will discuss with infectious disease and the necessity of LAURA.  Will follow with you.        I spent 35 minutes in the  professional and overall care of this patient.      Arsen Barclay, APRN-CNP

## 2024-01-11 NOTE — CONSULTS
Vancomycin Dosing by Pharmacy- Cessation of Therapy    Consult to pharmacy for vancomycin dosing has been discontinued by the prescriber, pharmacy will sign off at this time.    Please call pharmacy if there are further questions or re-enter a consult if vancomycin is resumed.     Radha Fallon, PharmD

## 2024-01-11 NOTE — NURSING NOTE
Report from Sending RN:    Report From: Hawa  Recent Surgery of Procedure: No  Baseline Level of Consciousness (LOC): A&O X's 4  Oxygen Use: No  Type: room air  Diabetic: No  Last BP Med Given Day of Dialysis: see EMAR  Last Pain Med Given: see EMAR  Lab Tests to be Obtained with Dialysis: No  Blood Transfusion to be Given During Dialysis: Yes  Available IV Access: No  Medications to be Administered During Dialysis: No  Continuous IV Infusion Running: No  Restraints on Currently or in the Last 24 Hours: No  Hand-Off Communication: Uneventful night.  Ready for dialysis

## 2024-01-12 ENCOUNTER — APPOINTMENT (OUTPATIENT)
Dept: CARDIOLOGY | Facility: HOSPITAL | Age: 49
DRG: 698 | End: 2024-01-12
Payer: MEDICARE

## 2024-01-12 ENCOUNTER — ANESTHESIA EVENT (OUTPATIENT)
Dept: CARDIOLOGY | Facility: HOSPITAL | Age: 49
DRG: 698 | End: 2024-01-12
Payer: MEDICARE

## 2024-01-12 ENCOUNTER — ANESTHESIA (OUTPATIENT)
Dept: CARDIOLOGY | Facility: HOSPITAL | Age: 49
DRG: 698 | End: 2024-01-12
Payer: MEDICARE

## 2024-01-12 ENCOUNTER — PREP FOR PROCEDURE (OUTPATIENT)
Dept: RADIOLOGY | Facility: HOSPITAL | Age: 49
End: 2024-01-12

## 2024-01-12 LAB
ANION GAP SERPL CALC-SCNC: 18 MMOL/L
BACTERIA BLD AEROBE CULT: ABNORMAL
BACTERIA BLD AEROBE CULT: ABNORMAL
BACTERIA BLD CULT: ABNORMAL
BACTERIA BLD CULT: ABNORMAL
BUN SERPL-MCNC: 22 MG/DL (ref 8–25)
CALCIUM SERPL-MCNC: 8.4 MG/DL (ref 8.5–10.4)
CHLORIDE SERPL-SCNC: 96 MMOL/L (ref 97–107)
CO2 SERPL-SCNC: 25 MMOL/L (ref 24–31)
CREAT SERPL-MCNC: 4.3 MG/DL (ref 0.4–1.6)
EGFRCR SERPLBLD CKD-EPI 2021: 12 ML/MIN/1.73M*2
ERYTHROCYTE [DISTWIDTH] IN BLOOD BY AUTOMATED COUNT: 17.1 % (ref 11.5–14.5)
GLUCOSE SERPL-MCNC: 97 MG/DL (ref 65–99)
GRAM STN SPEC: ABNORMAL
HCT VFR BLD AUTO: 28.1 % (ref 36–46)
HGB BLD-MCNC: 8.1 G/DL (ref 12–16)
MCH RBC QN AUTO: 22.8 PG (ref 26–34)
MCHC RBC AUTO-ENTMCNC: 28.8 G/DL (ref 32–36)
MCV RBC AUTO: 79 FL (ref 80–100)
NRBC BLD-RTO: 0 /100 WBCS (ref 0–0)
PLATELET # BLD AUTO: 205 X10*3/UL (ref 150–450)
POTASSIUM SERPL-SCNC: 4.3 MMOL/L (ref 3.4–5.1)
RBC # BLD AUTO: 3.56 X10*6/UL (ref 4–5.2)
SODIUM SERPL-SCNC: 139 MMOL/L (ref 133–145)
WBC # BLD AUTO: 5.7 X10*3/UL (ref 4.4–11.3)

## 2024-01-12 PROCEDURE — 2500000005 HC RX 250 GENERAL PHARMACY W/O HCPCS: Performed by: INTERNAL MEDICINE

## 2024-01-12 PROCEDURE — 2500000005 HC RX 250 GENERAL PHARMACY W/O HCPCS: Performed by: NURSE ANESTHETIST, CERTIFIED REGISTERED

## 2024-01-12 PROCEDURE — 36415 COLL VENOUS BLD VENIPUNCTURE: CPT | Performed by: HOSPITALIST

## 2024-01-12 PROCEDURE — 93318 ECHO TRANSESOPHAGEAL INTRAOP: CPT | Performed by: INTERNAL MEDICINE

## 2024-01-12 PROCEDURE — 0J2TXYZ CHANGE OTHER DEVICE IN TRUNK SUBCUTANEOUS TISSUE AND FASCIA, EXTERNAL APPROACH: ICD-10-PCS | Performed by: RADIOLOGY

## 2024-01-12 PROCEDURE — 2500000004 HC RX 250 GENERAL PHARMACY W/ HCPCS (ALT 636 FOR OP/ED): Performed by: NURSE ANESTHETIST, CERTIFIED REGISTERED

## 2024-01-12 PROCEDURE — B24BZZ4 ULTRASONOGRAPHY OF HEART WITH AORTA, TRANSESOPHAGEAL: ICD-10-PCS | Performed by: INTERNAL MEDICINE

## 2024-01-12 PROCEDURE — C1894 INTRO/SHEATH, NON-LASER: HCPCS | Performed by: RADIOLOGY

## 2024-01-12 PROCEDURE — 6350000001 HC RX 635 EPOETIN >10,000 UNITS: Mod: JW | Performed by: INTERNAL MEDICINE

## 2024-01-12 PROCEDURE — 2060000001 HC INTERMEDIATE ICU ROOM DAILY

## 2024-01-12 PROCEDURE — 93320 DOPPLER ECHO COMPLETE: CPT

## 2024-01-12 PROCEDURE — A93312 PR ECHO HEART,TRANSESOPHAGEAL,COMPLETE: Performed by: ANESTHESIOLOGY

## 2024-01-12 PROCEDURE — 2500000001 HC RX 250 WO HCPCS SELF ADMINISTERED DRUGS (ALT 637 FOR MEDICARE OP): Performed by: INTERNAL MEDICINE

## 2024-01-12 PROCEDURE — 2500000005 HC RX 250 GENERAL PHARMACY W/O HCPCS

## 2024-01-12 PROCEDURE — A36558 PR INSERT TUNNELED CV CATH W/O PORT OR PUMP: Performed by: NURSE ANESTHETIST, CERTIFIED REGISTERED

## 2024-01-12 PROCEDURE — 36556 INSERT NON-TUNNEL CV CATH: CPT | Performed by: RADIOLOGY

## 2024-01-12 PROCEDURE — 36573 INSJ PICC RS&I 5 YR+: CPT | Performed by: RADIOLOGY

## 2024-01-12 PROCEDURE — 2500000004 HC RX 250 GENERAL PHARMACY W/ HCPCS (ALT 636 FOR OP/ED): Performed by: INTERNAL MEDICINE

## 2024-01-12 PROCEDURE — A93312 PR ECHO HEART,TRANSESOPHAGEAL,COMPLETE: Performed by: NURSE ANESTHETIST, CERTIFIED REGISTERED

## 2024-01-12 PROCEDURE — 85027 COMPLETE CBC AUTOMATED: CPT | Performed by: HOSPITALIST

## 2024-01-12 PROCEDURE — 93312 ECHO TRANSESOPHAGEAL: CPT | Performed by: INTERNAL MEDICINE

## 2024-01-12 PROCEDURE — 93320 DOPPLER ECHO COMPLETE: CPT | Performed by: INTERNAL MEDICINE

## 2024-01-12 PROCEDURE — C1769 GUIDE WIRE: HCPCS | Performed by: RADIOLOGY

## 2024-01-12 PROCEDURE — 3700000001 HC GENERAL ANESTHESIA TIME - INITIAL BASE CHARGE: Performed by: RADIOLOGY

## 2024-01-12 PROCEDURE — 93325 DOPPLER ECHO COLOR FLOW MAPG: CPT | Performed by: INTERNAL MEDICINE

## 2024-01-12 PROCEDURE — 2720000007 HC OR 272 NO HCPCS: Performed by: RADIOLOGY

## 2024-01-12 PROCEDURE — 93318 ECHO TRANSESOPHAGEAL INTRAOP: CPT

## 2024-01-12 PROCEDURE — 80048 BASIC METABOLIC PNL TOTAL CA: CPT | Performed by: HOSPITALIST

## 2024-01-12 PROCEDURE — 2550000001 HC RX 255 CONTRASTS: Performed by: RADIOLOGY

## 2024-01-12 PROCEDURE — 2500000005 HC RX 250 GENERAL PHARMACY W/O HCPCS: Performed by: RADIOLOGY

## 2024-01-12 PROCEDURE — 2500000004 HC RX 250 GENERAL PHARMACY W/ HCPCS (ALT 636 FOR OP/ED): Performed by: HOSPITALIST

## 2024-01-12 PROCEDURE — A36558 PR INSERT TUNNELED CV CATH W/O PORT OR PUMP: Performed by: ANESTHESIOLOGY

## 2024-01-12 PROCEDURE — 2500000004 HC RX 250 GENERAL PHARMACY W/ HCPCS (ALT 636 FOR OP/ED): Performed by: ANESTHESIOLOGY

## 2024-01-12 PROCEDURE — 3700000001 HC GENERAL ANESTHESIA TIME - INITIAL BASE CHARGE

## 2024-01-12 PROCEDURE — 77001 FLUOROGUIDE FOR VEIN DEVICE: CPT | Performed by: RADIOLOGY

## 2024-01-12 PROCEDURE — 3700000002 HC GENERAL ANESTHESIA TIME - EACH INCREMENTAL 1 MINUTE

## 2024-01-12 PROCEDURE — 76937 US GUIDE VASCULAR ACCESS: CPT | Performed by: RADIOLOGY

## 2024-01-12 PROCEDURE — 3700000002 HC GENERAL ANESTHESIA TIME - EACH INCREMENTAL 1 MINUTE: Performed by: RADIOLOGY

## 2024-01-12 RX ORDER — OXYCODONE HYDROCHLORIDE 5 MG/1
5 TABLET ORAL EVERY 4 HOURS PRN
Status: DISPENSED | OUTPATIENT
Start: 2024-01-12 | End: 2024-01-12

## 2024-01-12 RX ORDER — LIDOCAINE HYDROCHLORIDE 10 MG/ML
INJECTION, SOLUTION EPIDURAL; INFILTRATION; INTRACAUDAL; PERINEURAL AS NEEDED
Status: DISCONTINUED | OUTPATIENT
Start: 2024-01-12 | End: 2024-01-15 | Stop reason: HOSPADM

## 2024-01-12 RX ORDER — PROPOFOL 10 MG/ML
INJECTION, EMULSION INTRAVENOUS AS NEEDED
Status: DISCONTINUED | OUTPATIENT
Start: 2024-01-12 | End: 2024-01-12

## 2024-01-12 RX ORDER — SODIUM CHLORIDE, SODIUM LACTATE, POTASSIUM CHLORIDE, CALCIUM CHLORIDE 600; 310; 30; 20 MG/100ML; MG/100ML; MG/100ML; MG/100ML
INJECTION, SOLUTION INTRAVENOUS CONTINUOUS PRN
Status: DISCONTINUED | OUTPATIENT
Start: 2024-01-12 | End: 2024-01-12

## 2024-01-12 RX ORDER — PROPOFOL 10 MG/ML
INJECTION, EMULSION INTRAVENOUS CONTINUOUS PRN
Status: DISCONTINUED | OUTPATIENT
Start: 2024-01-12 | End: 2024-01-12

## 2024-01-12 RX ORDER — FENTANYL CITRATE 50 UG/ML
50 INJECTION, SOLUTION INTRAMUSCULAR; INTRAVENOUS EVERY 5 MIN PRN
Status: DISCONTINUED | OUTPATIENT
Start: 2024-01-12 | End: 2024-01-15

## 2024-01-12 RX ORDER — FENTANYL CITRATE 50 UG/ML
25 INJECTION, SOLUTION INTRAMUSCULAR; INTRAVENOUS EVERY 5 MIN PRN
Status: ACTIVE | OUTPATIENT
Start: 2024-01-12 | End: 2024-01-12

## 2024-01-12 RX ORDER — FENTANYL CITRATE 50 UG/ML
INJECTION, SOLUTION INTRAMUSCULAR; INTRAVENOUS AS NEEDED
Status: DISCONTINUED | OUTPATIENT
Start: 2024-01-12 | End: 2024-01-12

## 2024-01-12 RX ORDER — VANCOMYCIN 750 MG/150ML
750 INJECTION, SOLUTION INTRAVENOUS
Status: DISCONTINUED | OUTPATIENT
Start: 2024-01-13 | End: 2024-01-15 | Stop reason: HOSPADM

## 2024-01-12 RX ORDER — LIDOCAINE HYDROCHLORIDE 10 MG/ML
0.1 INJECTION INFILTRATION; PERINEURAL ONCE
Status: DISCONTINUED | OUTPATIENT
Start: 2024-01-12 | End: 2024-01-15

## 2024-01-12 RX ORDER — SODIUM CHLORIDE, SODIUM LACTATE, POTASSIUM CHLORIDE, CALCIUM CHLORIDE 600; 310; 30; 20 MG/100ML; MG/100ML; MG/100ML; MG/100ML
100 INJECTION, SOLUTION INTRAVENOUS CONTINUOUS
Status: DISCONTINUED | OUTPATIENT
Start: 2024-01-12 | End: 2024-01-15 | Stop reason: HOSPADM

## 2024-01-12 RX ORDER — LIDOCAINE HYDROCHLORIDE 10 MG/ML
INJECTION INFILTRATION; PERINEURAL AS NEEDED
Status: DISCONTINUED | OUTPATIENT
Start: 2024-01-12 | End: 2024-01-12

## 2024-01-12 RX ORDER — LABETALOL HYDROCHLORIDE 5 MG/ML
5 INJECTION, SOLUTION INTRAVENOUS ONCE AS NEEDED
Status: DISCONTINUED | OUTPATIENT
Start: 2024-01-12 | End: 2024-01-15

## 2024-01-12 RX ORDER — MIDAZOLAM HYDROCHLORIDE 1 MG/ML
INJECTION, SOLUTION INTRAMUSCULAR; INTRAVENOUS AS NEEDED
Status: DISCONTINUED | OUTPATIENT
Start: 2024-01-12 | End: 2024-01-12

## 2024-01-12 RX ORDER — IODIXANOL 320 MG/ML
INJECTION, SOLUTION INTRAVASCULAR AS NEEDED
Status: DISCONTINUED | OUTPATIENT
Start: 2024-01-12 | End: 2024-01-15 | Stop reason: HOSPADM

## 2024-01-12 RX ORDER — KETAMINE HYDROCHLORIDE 10 MG/ML
INJECTION, SOLUTION INTRAMUSCULAR; INTRAVENOUS AS NEEDED
Status: DISCONTINUED | OUTPATIENT
Start: 2024-01-12 | End: 2024-01-12

## 2024-01-12 RX ORDER — SODIUM CHLORIDE 9 MG/ML
INJECTION, SOLUTION INTRAVENOUS CONTINUOUS PRN
Status: DISCONTINUED | OUTPATIENT
Start: 2024-01-12 | End: 2024-01-12

## 2024-01-12 RX ADMIN — CLONIDINE HYDROCHLORIDE 0.1 MG: 0.1 TABLET ORAL at 21:20

## 2024-01-12 RX ADMIN — HYDRALAZINE HYDROCHLORIDE 50 MG: 50 TABLET ORAL at 21:20

## 2024-01-12 RX ADMIN — MIDAZOLAM 2 MG: 1 INJECTION INTRAMUSCULAR; INTRAVENOUS at 16:28

## 2024-01-12 RX ADMIN — MORPHINE SULFATE 2 MG: 2 INJECTION, SOLUTION INTRAMUSCULAR; INTRAVENOUS at 06:55

## 2024-01-12 RX ADMIN — FENTANYL CITRATE 50 MCG: 0.05 INJECTION, SOLUTION INTRAMUSCULAR; INTRAVENOUS at 17:28

## 2024-01-12 RX ADMIN — FENTANYL CITRATE 50 MCG: 0.05 INJECTION, SOLUTION INTRAMUSCULAR; INTRAVENOUS at 17:19

## 2024-01-12 RX ADMIN — DIPHENHYDRAMINE HYDROCHLORIDE 25 MG: 50 INJECTION, SOLUTION INTRAMUSCULAR; INTRAVENOUS at 18:36

## 2024-01-12 RX ADMIN — MIDAZOLAM 2 MG: 1 INJECTION INTRAMUSCULAR; INTRAVENOUS at 08:35

## 2024-01-12 RX ADMIN — PROPOFOL 50 MG: 10 INJECTION, EMULSION INTRAVENOUS at 08:47

## 2024-01-12 RX ADMIN — LIDOCAINE HYDROCHLORIDE 5 ML: 10 INJECTION, SOLUTION EPIDURAL; INFILTRATION; INTRACAUDAL; PERINEURAL at 16:35

## 2024-01-12 RX ADMIN — MORPHINE SULFATE 2 MG: 2 INJECTION, SOLUTION INTRAMUSCULAR; INTRAVENOUS at 10:40

## 2024-01-12 RX ADMIN — PROPOFOL 50 MG: 10 INJECTION, EMULSION INTRAVENOUS at 08:35

## 2024-01-12 RX ADMIN — MORPHINE SULFATE 2 MG: 2 INJECTION, SOLUTION INTRAMUSCULAR; INTRAVENOUS at 18:43

## 2024-01-12 RX ADMIN — LIDOCAINE HYDROCHLORIDE 50 MG: 10 INJECTION, SOLUTION INFILTRATION; PERINEURAL at 16:28

## 2024-01-12 RX ADMIN — FENTANYL CITRATE 50 MCG: 50 INJECTION, SOLUTION INTRAMUSCULAR; INTRAVENOUS at 16:32

## 2024-01-12 RX ADMIN — RIFAMPIN 300 MG: 300 CAPSULE ORAL at 21:23

## 2024-01-12 RX ADMIN — SODIUM CHLORIDE, POTASSIUM CHLORIDE, SODIUM LACTATE AND CALCIUM CHLORIDE: 600; 310; 30; 20 INJECTION, SOLUTION INTRAVENOUS at 16:19

## 2024-01-12 RX ADMIN — MORPHINE SULFATE 2 MG: 2 INJECTION, SOLUTION INTRAMUSCULAR; INTRAVENOUS at 23:22

## 2024-01-12 RX ADMIN — PROPOFOL 50 MG: 10 INJECTION, EMULSION INTRAVENOUS at 08:42

## 2024-01-12 RX ADMIN — IODIXANOL 10 ML: 320 INJECTION, SOLUTION INTRAVASCULAR at 16:59

## 2024-01-12 RX ADMIN — LIDOCAINE HYDROCHLORIDE 5 ML: 10 INJECTION, SOLUTION INFILTRATION; PERINEURAL at 08:35

## 2024-01-12 RX ADMIN — MORPHINE SULFATE 2 MG: 2 INJECTION, SOLUTION INTRAMUSCULAR; INTRAVENOUS at 00:59

## 2024-01-12 RX ADMIN — DIPHENHYDRAMINE HYDROCHLORIDE 25 MG: 50 INJECTION, SOLUTION INTRAMUSCULAR; INTRAVENOUS at 23:22

## 2024-01-12 RX ADMIN — DIPHENHYDRAMINE HYDROCHLORIDE 25 MG: 50 INJECTION, SOLUTION INTRAMUSCULAR; INTRAVENOUS at 01:08

## 2024-01-12 RX ADMIN — Medication 3 L/MIN: at 08:31

## 2024-01-12 RX ADMIN — FENTANYL CITRATE 50 MCG: 50 INJECTION, SOLUTION INTRAMUSCULAR; INTRAVENOUS at 16:27

## 2024-01-12 RX ADMIN — SODIUM CHLORIDE: 9 INJECTION, SOLUTION INTRAVENOUS at 08:17

## 2024-01-12 RX ADMIN — PROPOFOL 75 MCG/KG/MIN: 10 INJECTION, EMULSION INTRAVENOUS at 16:26

## 2024-01-12 RX ADMIN — KETAMINE HYDROCHLORIDE 10 MG: 10 INJECTION INTRAMUSCULAR; INTRAVENOUS at 08:35

## 2024-01-12 RX ADMIN — DIPHENHYDRAMINE HYDROCHLORIDE 25 MG: 50 INJECTION, SOLUTION INTRAMUSCULAR; INTRAVENOUS at 09:33

## 2024-01-12 SDOH — HEALTH STABILITY: MENTAL HEALTH: CURRENT SMOKER: 1

## 2024-01-12 SDOH — HEALTH STABILITY: MENTAL HEALTH: CURRENT SMOKER: 0

## 2024-01-12 ASSESSMENT — COGNITIVE AND FUNCTIONAL STATUS - GENERAL
DAILY ACTIVITIY SCORE: 24
DAILY ACTIVITIY SCORE: 24
MOBILITY SCORE: 23
MOBILITY SCORE: 24
CLIMB 3 TO 5 STEPS WITH RAILING: A LITTLE

## 2024-01-12 ASSESSMENT — PAIN SCALES - GENERAL
PAINLEVEL_OUTOF10: 9
PAINLEVEL_OUTOF10: 9
PAINLEVEL_OUTOF10: 6
PAINLEVEL_OUTOF10: 7
PAINLEVEL_OUTOF10: 3
PAINLEVEL_OUTOF10: 0 - NO PAIN
PAINLEVEL_OUTOF10: 9

## 2024-01-12 ASSESSMENT — PAIN DESCRIPTION - LOCATION
LOCATION: LEG
LOCATION: LEG

## 2024-01-12 ASSESSMENT — PAIN - FUNCTIONAL ASSESSMENT
PAIN_FUNCTIONAL_ASSESSMENT: 0-10

## 2024-01-12 NOTE — POST-PROCEDURE NOTE
Physician Transition of Care Summary  Invasive Cardiovascular Lab    Procedure Date: 1/12/2024  Attending: * Surgery not found *  Resident/Fellow/Other Assistant: * Surgery not found *    Indications:   Persistent bacteremia    Post-procedure diagnosis:   Prosthetic mitral valve vegetation    Procedure(s):   Transesophageal echocardiogram    Procedure Findings:   The patient was brought to the Cath Lab.  Sedation was administered via anesthesia.  We were able to pass the probe without difficulty on first attempt.  All the views were taken at the mid esophageal level.  It is quite clear that there is a persistent mitral valve vegetation.  In comparison to the study in September it is smaller in size.  There was also a vegetation on the ventricular side of the valve seen in the September study that is no longer there.  There is a small perforation of the valve associated with the vegetation with very minimal/trace regurgitation.  Please see report for full details.    Complications:   None    Stents/Implants:   Cardiovascular Implants       Pacemaker    Lead, Pacing, Cardiac, Temporary, W/Suture, Flexon, 2-0, 24 Case [id number] - Implanted        Model/Cat number: [id number] : [facility] E-House    Implant Date: 9/26/2022 Description: Converted from [facility]. Please see archived information for full log information.      As of 9/1/2023       Status: Unknown                      Lead, Pacing, Myocardial, Bipolar, Temporary Case [id number] - Implanted        Model/Cat number: [id number] : [employer]    Implant Date: 9/26/2022 Description: Converted from [facility]. Please see archived information for full log information.      As of 9/1/2023       Status: Unknown                      Other Cardiac Implant    Valve, Aortic Inspiris Resilia 21mm Case [id number] - Implanted        Model/Cat number: [id number] Serial number: [id number]    : [employer]        As of 9/1/2023        Status: Unknown                      Valve, Mitral Epic, [employer] [place], 27mm Case [id number] - Implanted        Model/Cat number: [id number] Serial number: [id number]    : [employer]        As of 9/1/2023       Status: Unknown                              Anticoagulation/Antiplatelet Plan:   None    Estimated Blood Loss:   None  Disposition:   Return to floor      Electronically signed by: Fabian Skaggs DO, 1/12/2024 11:57 AM

## 2024-01-12 NOTE — ANESTHESIA PREPROCEDURE EVALUATION
Patient: Batsheva Page    Procedure Information       Date/Time: 01/12/24 1430    Procedure: IR CVC EXCHANGE    Location: [facility]            Relevant Problems   Cardiovascular   (+) Arteriosclerosis of coronary artery bypass graft   (+) Benign essential hypertension   (+) Cardiac pacemaker in situ   (+) Coronary artery disease   (+) Hypertension   (+) Paroxysmal atrial fibrillation (CMS/HCC)      Endocrine   (+) Hyponatremia      /Renal   (+) Anemia secondary to renal failure   (+) Dependence on renal dialysis (CMS/HCC)   (+) End stage renal disease (CMS/HCC)   (+) End-stage renal disease on hemodialysis (CMS/HCC)      Neuro/Psych   (+) Anxiety   (+) Depression with anxiety   (+) Major depressive disorder, recurrent, severe with psychotic symptoms (CMS/[facility])   (+) Seizure (CMS/[facility])      Hematology   (+) Anemia of chronic disease   (+) Anemia secondary to renal failure   (+) Iron deficiency anemia      Infectious Disease   (+) MRSA (methicillin resistant Staphylococcus aureus) infection       Clinical information reviewed:   Tobacco  Allergies  Meds  Problems  Med Hx  Surg Hx  OB Status    Fam Hx  Soc Hx        NPO Detail:  NPO/Void Status  Date of Last Liquid: 01/11/24  Time of Last Liquid: 0000  Date of Last Solid: 01/11/24         Physical Exam    Airway  Mallampati: II  TM distance: >3 FB  Neck ROM: full     Cardiovascular    Dental    Pulmonary    Abdominal            Anesthesia Plan    History of general anesthesia?: yes  History of complications of general anesthesia?: no    ASA 3     MAC     The patient is not a current smoker.    intravenous induction   Anesthetic plan and risks discussed with patient.  Use of blood products discussed with patient who.    Plan discussed with CRNA and attending.

## 2024-01-12 NOTE — PROGRESS NOTES
Pt with LAURA this morning.      01/12/24 5922   Discharge Planning   Patient expects to be discharged to: Home

## 2024-01-12 NOTE — PROGRESS NOTES
"Batsheva Page is a 48 y.o. female on day 2 of admission presenting with Hyperkalemia.    Subjective   Is doing well she has no fever or chills most recent blood cultures are still pending she is going down for dialysis catheter exchange today has no symptoms       Objective     Physical Exam  Neck:      Vascular: No carotid bruit.   Cardiovascular:      Rate and Rhythm: Normal rate and regular rhythm.      Heart sounds: No murmur heard.     No friction rub. No gallop.   Pulmonary:      Breath sounds: No wheezing, rhonchi or rales.   Chest:      Chest wall: No tenderness.   Abdominal:      General: There is no distension.      Tenderness: There is no abdominal tenderness. There is no guarding or rebound.   Musculoskeletal:         General: No swelling or tenderness.      Cervical back: Neck supple.      Right lower leg: No edema.      Left lower leg: No edema.   Lymphadenopathy:      Cervical: No cervical adenopathy.         Last Recorded Vitals  Blood pressure 140/53, pulse 91, temperature 36.1 °C (97 °F), temperature source Oral, resp. rate 18, height 1.651 m (5' 5\"), weight 64.4 kg (141 lb 15.6 oz), SpO2 100 %.    Intake/Output last 3 Shifts:  I/O last 3 completed shifts:  In: 1066 (16.6 mL/kg) [P.O.:666; I.V.:400 (6.2 mL/kg)]  Out: 1500 (23.3 mL/kg) [Other:1500]  Weight: 64.4 kg     Current Facility-Administered Medications:     acetaminophen (Tylenol) tablet 650 mg, 650 mg, oral, q6h PRN, Fabian Skaggs DO    aspirin EC tablet 81 mg, 81 mg, oral, Daily, Fabian Skaggs DO, 81 mg at 01/08/24 1142    atorvastatin (Lipitor) tablet 40 mg, 40 mg, oral, Nightly, Fabian Skaggs DO, 40 mg at 01/11/24 2126    buPROPion (Wellbutrin) tablet 100 mg, 100 mg, oral, Every other day, Fabian Skaggs DO    butamben-tetracaine-benzocaine (Cetacaine) spray, , , PRN, Fabian Skaggs DO, 1 spray at 01/12/24 0834    calcitriol (Rocaltrol) capsule 0.5 mcg, 0.5 mcg, oral, Daily, Fabian Skaggs DO, 0.5 mcg at 01/10/24 0812    " cloNIDine (Catapres) tablet 0.1 mg, 0.1 mg, oral, q8h KIMBERLY, Fabian Skaggs DO, 0.1 mg at 01/11/24 2127    diphenhydrAMINE (BENADryl) injection 25 mg, 25 mg, intravenous, q4h PRN, Fabian Skaggs DO, 25 mg at 01/12/24 0933    epoetin brandy-epbx (RETACRIT) injection 6,000 Units, 100 Units/kg, subcutaneous, Once per day on Mon Wed Fri, Fabian Skaggs DO, 6,000 Units at 01/10/24 1130    heparin (porcine) injection 5,000 Units, 5,000 Units, subcutaneous, q8h KIMBERLY, Fabian Skaggs DO    heparin 1,000 unit/mL injection 1,000 Units, 1,000 Units, intra-catheter, After Dialysis, Fabian Skaggs DO, 2,100 Units at 01/11/24 1148    heparin 1,000 unit/mL injection 1,000 Units, 1,000 Units, intra-catheter, After Dialysis, Fabian Skaggs DO, 2,100 Units at 01/11/24 1147    hydrALAZINE (Apresoline) tablet 50 mg, 50 mg, oral, TID, Fabian Skaggs DO, 50 mg at 01/11/24 2126    levETIRAcetam (Keppra) tablet 500 mg, 500 mg, oral, Nightly, Fabian Skaggs DO, 500 mg at 01/11/24 2126    metoprolol succinate XL (Toprol-XL) 24 hr tablet 50 mg, 50 mg, oral, BID, Fabian Skaggs DO, 50 mg at 01/11/24 2127    morphine injection 2 mg, 2 mg, intravenous, q4h PRN, Fabian Skaggs DO, 2 mg at 01/12/24 0655    oxyCODONE-acetaminophen (Percocet) 5-325 mg per tablet 1 tablet, 1 tablet, oral, q6h PRN, Fabian Skaggs DO    oxygen (O2) therapy, , , Continuous PRN, Fabian Skaggs DO, Last Rate: 900,000 mL/hr at 01/12/24 0839, 15 L/min at 01/12/24 0839    pregabalin (Lyrica) capsule 25 mg, 25 mg, oral, BID, Fabian Skaggs DO, 25 mg at 01/11/24 2127    promethazine (Phenergan) 12.5 mg in sodium chloride 0.9% 50 mL IV, 12.5 mg, intravenous, q6h PRN, Fabian Skaggs DO, 12.5 mg at 01/08/24 2152    rifAMPin (Rifadin) capsule 300 mg, 300 mg, oral, q8h, Fabian Skaggs DO, 300 mg at 01/11/24 2305    [START ON 1/13/2024] vancomycin (Vancocin) in dextrose 5 % water (D5W) 150 mL  mg, 750 mg, intravenous, After Dialysis, MD negrito Welshm  (Ambien) tablet 5 mg, 5 mg, oral, Nightly PRN, Fabian Skaggs, , 5 mg at 01/11/24 1173   Relevant Results    Results for orders placed or performed during the hospital encounter of 01/08/24 (from the past 96 hour(s))   Basic Metabolic Panel   Result Value Ref Range    Glucose 92 65 - 99 mg/dL    Sodium 134 133 - 145 mmol/L    Potassium 6.3 (HH) 3.4 - 5.1 mmol/L    Chloride 94 (L) 97 - 107 mmol/L    Bicarbonate 25 24 - 31 mmol/L    Urea Nitrogen 27 (H) 8 - 25 mg/dL    Creatinine 5.90 (H) 0.40 - 1.60 mg/dL    eGFR 8 (L) >60 mL/min/1.73m*2    Calcium 7.2 (L) 8.5 - 10.4 mg/dL    Anion Gap 15 <=19 mmol/L   CBC   Result Value Ref Range    WBC 6.7 4.4 - 11.3 x10*3/uL    nRBC 0.0 0.0 - 0.0 /100 WBCs    RBC 3.19 (L) 4.00 - 5.20 x10*6/uL    Hemoglobin 7.4 (L) 12.0 - 16.0 g/dL    Hematocrit 24.5 (L) 36.0 - 46.0 %    MCV 77 (L) 80 - 100 fL    MCH 23.2 (L) 26.0 - 34.0 pg    MCHC 30.2 (L) 32.0 - 36.0 g/dL    RDW 17.3 (H) 11.5 - 14.5 %    Platelets 211 150 - 450 x10*3/uL   Blood Culture    Specimen: Peripheral Venipuncture; Blood culture   Result Value Ref Range    Blood Culture Methicillin Resistant Staphylococcus aureus (MRSA) (AA)     BLOOD CULTURE BOTTLE  Positive Aerobic and Anaerobic Bottle     Gram Stain Gram positive cocci, clusters (AA)        Susceptibility    Methicillin Resistant Staphylococcus aureus (MRSA) - MICROSCAN     Clindamycin  Susceptible      Erythromycin  Susceptible      Oxacillin  Resistant      Tetracycline  Susceptible      Trimethoprim/Sulfamethoxazole  Susceptible      Vancomycin  Susceptible    Blood Culture    Specimen: Peripheral Venipuncture; Blood culture   Result Value Ref Range    Blood Culture       Identification and susceptibility testing to follow    Blood Culture Staphylococcus aureus (AA)     BLOOD CULTURE BOTTLE  Positive Aerobic Bottle     Gram Stain Gram positive cocci, clusters (AA)     Gram Stain Gram positive cocci, clusters (AA)    Potassium   Result Value Ref Range    Potassium  5.0 3.4 - 5.1 mmol/L   Basic Metabolic Panel   Result Value Ref Range    Glucose 93 65 - 99 mg/dL    Sodium 134 133 - 145 mmol/L    Potassium 5.0 3.4 - 5.1 mmol/L    Chloride 96 (L) 97 - 107 mmol/L    Bicarbonate 23 (L) 24 - 31 mmol/L    Urea Nitrogen 22 8 - 25 mg/dL    Creatinine 4.60 (H) 0.40 - 1.60 mg/dL    eGFR 11 (L) >60 mL/min/1.73m*2    Calcium 8.1 (L) 8.5 - 10.4 mg/dL    Anion Gap 15 <=19 mmol/L   CBC   Result Value Ref Range    WBC 6.1 4.4 - 11.3 x10*3/uL    nRBC 0.0 0.0 - 0.0 /100 WBCs    RBC 2.94 (L) 4.00 - 5.20 x10*6/uL    Hemoglobin 6.7 (L) 12.0 - 16.0 g/dL    Hematocrit 22.9 (L) 36.0 - 46.0 %    MCV 78 (L) 80 - 100 fL    MCH 22.8 (L) 26.0 - 34.0 pg    MCHC 29.3 (L) 32.0 - 36.0 g/dL    RDW 17.3 (H) 11.5 - 14.5 %    Platelets 194 150 - 450 x10*3/uL   Vancomycin   Result Value Ref Range    Vancomycin 40.8 (H) 10.0 - 20.0 ug/mL   Prepare RBC: 1 Units   Result Value Ref Range    PRODUCT CODE [id number]     Unit Number [id number]     Unit ABO A     Unit RH NEG     XM INTEP COMP     Dispense Status XM     Blood Expiration Date January 24, 2024 23:59 EST     PRODUCT BLOOD TYPE 0600     UNIT VOLUME 350    Type and screen   Result Value Ref Range    ABO TYPE A     Rh TYPE NEG     ANTIBODY SCREEN NEG    Transthoracic Echo (TTE) Complete   Result Value Ref Range    AV pk peggy 2.88     LVOT diam 2.00     AV mn grad 19.0     MV E/A ratio 1.85     LV biplane EF 63     RVSP 35.3     LVIDd 3.87     AV pk grad 33.2     Aortic Valve Area by Continuity of VTI 1.06     Aortic Valve Area by Continuity of Peak Velocity 1.22     LV A4C EF 60.9    Basic Metabolic Panel   Result Value Ref Range    Glucose 88 65 - 99 mg/dL    Sodium 135 133 - 145 mmol/L    Potassium 5.4 (H) 3.4 - 5.1 mmol/L    Chloride 93 (L) 97 - 107 mmol/L    Bicarbonate 20 (L) 24 - 31 mmol/L    Urea Nitrogen 36 (H) 8 - 25 mg/dL    Creatinine 6.40 (H) 0.40 - 1.60 mg/dL    eGFR 7 (L) >60 mL/min/1.73m*2    Calcium 8.5 8.5 - 10.4 mg/dL    Anion Gap >19 (H) <=19  mmol/L   CBC   Result Value Ref Range    WBC 7.2 4.4 - 11.3 x10*3/uL    nRBC 0.0 0.0 - 0.0 /100 WBCs    RBC 3.60 (L) 4.00 - 5.20 x10*6/uL    Hemoglobin 8.3 (L) 12.0 - 16.0 g/dL    Hematocrit 28.1 (L) 36.0 - 46.0 %    MCV 78 (L) 80 - 100 fL    MCH 23.1 (L) 26.0 - 34.0 pg    MCHC 29.5 (L) 32.0 - 36.0 g/dL    RDW 17.2 (H) 11.5 - 14.5 %    Platelets 200 150 - 450 x10*3/uL   Vancomycin   Result Value Ref Range    Vancomycin 40.4 (H) 10.0 - 20.0 ug/mL   Vancomycin   Result Value Ref Range    Vancomycin 26.1 (H) 10.0 - 20.0 ug/mL   Blood Culture    Specimen: Peripheral Venipuncture; Blood culture   Result Value Ref Range    Blood Culture Loaded on Instrument - Culture in progress    Blood Culture    Specimen: Peripheral Venipuncture; Blood culture   Result Value Ref Range    Blood Culture Loaded on Instrument - Culture in progress    Basic Metabolic Panel   Result Value Ref Range    Glucose 97 65 - 99 mg/dL    Sodium 139 133 - 145 mmol/L    Potassium 4.3 3.4 - 5.1 mmol/L    Chloride 96 (L) 97 - 107 mmol/L    Bicarbonate 25 24 - 31 mmol/L    Urea Nitrogen 22 8 - 25 mg/dL    Creatinine 4.30 (H) 0.40 - 1.60 mg/dL    eGFR 12 (L) >60 mL/min/1.73m*2    Calcium 8.4 (L) 8.5 - 10.4 mg/dL    Anion Gap 18 <=19 mmol/L   CBC   Result Value Ref Range    WBC 5.7 4.4 - 11.3 x10*3/uL    nRBC 0.0 0.0 - 0.0 /100 WBCs    RBC 3.56 (L) 4.00 - 5.20 x10*6/uL    Hemoglobin 8.1 (L) 12.0 - 16.0 g/dL    Hematocrit 28.1 (L) 36.0 - 46.0 %    MCV 79 (L) 80 - 100 fL    MCH 22.8 (L) 26.0 - 34.0 pg    MCHC 28.8 (L) 32.0 - 36.0 g/dL    RDW 17.1 (H) 11.5 - 14.5 %    Platelets 205 150 - 450 x10*3/uL     Impression and plan;    End-stage kidney disease   MRSA bacteremia secondary to line infection  Hyperkalemia  Malfunctioning dialysis catheter  Hypertension  Anemia of chronic kidney disease  History of recurrent endocarditis    Plan:  Dialysis tomorrow morning hopefully the dialysis catheter will be exchanged today awaiting interventional radiology input  continue antibiotic therapy, discussed with ID, check the LAURA results                   Zhou Pedersen MD

## 2024-01-12 NOTE — ANESTHESIA PREPROCEDURE EVALUATION
Patient: Batsheva Page    Procedure Information       Date/Time: 01/12/24 0800    Scheduled providers: Masoud Serrato MD; Fabian Skaggs DO; ORI Luz-CRNA    Procedure: TRANSESOPHAGEAL ECHO (LAURA)    [facility]: [facility]; [facility]            Relevant Problems   Cardiovascular   (+) Arteriosclerosis of coronary artery bypass graft   (+) Benign essential hypertension   (+) Cardiac pacemaker in situ   (+) Coronary artery disease   (+) Hypertension   (+) Paroxysmal atrial fibrillation (CMS/HCC)      Endocrine   (+) Hyponatremia      /Renal   (+) Anemia secondary to renal failure   (+) Dependence on renal dialysis (CMS/HCC)   (+) End stage renal disease (CMS/HCC)   (+) End-stage renal disease on hemodialysis (CMS/[facility])      Neuro/Psych   (+) Anxiety   (+) Depression with anxiety   (+) Major depressive disorder, recurrent, severe with psychotic symptoms (CMS/[facility])   (+) Seizure (CMS/[facility])      Hematology   (+) Anemia of chronic disease   (+) Anemia secondary to renal failure   (+) Iron deficiency anemia      Infectious Disease   (+) MRSA (methicillin resistant Staphylococcus aureus) infection     EF 60-65% per echo 1/10/24    Clinical information reviewed:   Tobacco  Allergies  Meds   Med Hx  Surg Hx  OB Status  Fam Hx  Soc   Hx        NPO Detail:  NPO/Void Status  Date of Last Liquid: 01/12/24  Time of Last Liquid: 0000  Date of Last Solid: 01/11/24         Physical Exam    Airway  Mallampati: II  TM distance: >3 FB  Neck ROM: full     Cardiovascular    Dental    Pulmonary    Abdominal            Anesthesia Plan    History of general anesthesia?: yes  History of complications of general anesthesia?: no    ASA 3     MAC     The patient is a current smoker.    intravenous induction   Anesthetic plan and risks discussed with patient.  Use of blood products discussed with patient who.    Plan discussed with CRNA and attending.

## 2024-01-12 NOTE — NURSING NOTE
"Pt back from scheduled LAURA, \"wants her benadryl and wants to eat.\"  Able to swallow without difficulty, alert   "

## 2024-01-12 NOTE — ANESTHESIA POSTPROCEDURE EVALUATION
Patient: Batsheva Page    Procedure Summary       Date: 01/12/24 Room / Location: [facility]    Anesthesia Start: 1626 Anesthesia Stop: 1718    Procedure: IR CVC EXCHANGE Diagnosis: (Infected dialysis catheter)    Scheduled Providers: Chris Lott MD Responsible Provider: Masoud Serrato MD    Anesthesia Type: MAC ASA Status: 3            Anesthesia Type: MAC    Vitals Value Taken Time   /67 01/12/24 1719   Temp 36.3 01/12/24 1720   Pulse 86 01/12/24 1719   Resp 13 01/12/24 1719   SpO2 100 % 01/12/24 1719   Vitals shown include unvalidated device data.    Anesthesia Post Evaluation    Patient location during evaluation: PACU  Patient participation: complete - patient participated  Level of consciousness: awake  Pain management: adequate  Airway patency: patent  Cardiovascular status: acceptable  Respiratory status: acceptable  Hydration status: acceptable  Postoperative Nausea and Vomiting: none        There were no known notable events for this encounter.

## 2024-01-12 NOTE — PROGRESS NOTES
Late entry from 1/11/2024.  Had extensive discussions with Dr. Soto, Dr. Pedersen and Dr. Rogers regarding this patient.  Patient has a history of recurrent staph bacteremia.  Patient has a tunneled dialysis catheter that will  need to be removed given her most recent positive blood cultures.  Given difficulty of access for dialysis, Dr. Rogers recommends IR exchanging the current catheter with a temporary dialysis catheter over a wire.  Once blood cultures have cleared, exchange temporary dialysis catheter with a tunneled permanent dialysis catheter over a wire.      John Fitzgerald, APRN-CNP

## 2024-01-12 NOTE — CARE PLAN
The patient's goals for the shift include none    The clinical goals for the shift include rest    Over the shift, the patient did not make progress toward the following goals. Barriers to progression include n/a. Recommendations to address these barriers include rest.

## 2024-01-12 NOTE — ANESTHESIA POSTPROCEDURE EVALUATION
Patient: Batsheva Page    Procedure Summary       Date: 01/12/24 Room / [facility]    Anesthesia Start: 0811 Anesthesia Stop: 0910    Procedure: TRANSESOPHAGEAL ECHO (LAURA) Diagnosis:       Bacterial endocarditis, unspecified chronicity      (Prosthetic Infectious Endocarditis)    Scheduled Providers: Masoud Serrato MD; Fabian Skagsg DO; ORI Luz-CRNA Responsible Provider: Masoud Serrato MD    Anesthesia Type: MAC ASA Status: 3            Anesthesia Type: MAC    Vitals Value Taken Time   /47 01/12/24 0909   Temp 36.0 01/12/24 1055   Pulse 87 01/12/24 1054   Resp 18 01/12/24 0909   SpO2 100 % 01/12/24 0909   Vitals shown include unvalidated device data.    Anesthesia Post Evaluation    Patient location during evaluation: PACU  Patient participation: complete - patient participated  Level of consciousness: awake  Pain management: adequate  Airway patency: patent  Cardiovascular status: acceptable  Respiratory status: acceptable  Hydration status: acceptable  Postoperative Nausea and Vomiting: none        There were no known notable events for this encounter.

## 2024-01-12 NOTE — PROGRESS NOTES
Batsheva Temple is a 48 y.o. female on day 2 of admission presenting with Hyperkalemia.      Subjective   Seen after LAURA. She is wondering about IR procedure today.  No other complaints.            Objective     Last Recorded Vitals  /58 (BP Location: Left arm, Patient Position: Lying)   Pulse 85   Temp 36 °C (96.8 °F) (Temporal)   Resp 18   Wt 64.4 kg (141 lb 15.6 oz)   SpO2 100%   Intake/Output last 3 Shifts:    Intake/Output Summary (Last 24 hours) at 1/12/2024 1301  Last data filed at 1/12/2024 0905  Gross per 24 hour   Intake 272 ml   Output 0 ml   Net 272 ml         Admission Weight  Weight: 65 kg (143 lb 4.8 oz) (01/08/24 0548)    Daily Weight  01/09/24 : 64.4 kg (141 lb 15.6 oz)    Image Results  Transthoracic Echo (TTE) [place] [address]             Phone [phone]    TRANSTHORACIC ECHOCARDIOGRAM REPORT       Patient Name:     BATSHEVA TEMPLE Reading Physician:   [id number] Skaggs DO  Study Date:       1/10/2024            Ordering Provider:   [id number] MASSIEL NARANJO  MRN/PID:          [id number]             Fellow:  Accession#:       [id number]         Nurse:  Date of           1975 / [birth year]      Sonographer:         Lety Ivy RDCS  Birth/Age:        years  Gender:           F                    Additional Staff:  Height:           165.10 cm            Admit Date:  Weight:           63.96 kg             Admission Status:    Inpatient - Routine  BSA:              1.71 m2              Department Location: [facility]  Blood Pressure: 110 /36 mmHg    Study Type:    TRANSTHORACIC ECHO (TTE) COMPLETE  Diagnosis/ICD: Presence of prosthetic heart valve-Z95.2  Indication:    bacteremia  CPT Codes:     Echo Complete w Full Doppler-85052    Patient History:  Pacer/Defib:       Permanent pacemaker  Pertinent History: HTN. s/p MVR, s/p AVR,bacteremia,  PAF,ESRD, Anemia,                     CABG,endocarditis.    Study Detail: The following Echo studies were performed: 2D, M-Mode, color flow                and Doppler. Technically challenging study due to dialysis cath lt                parasternal.       PHYSICIAN INTERPRETATION:  Left Ventricle: Left ventricular systolic function is normal, with an estimated ejection fraction of 60-65%. There are no regional wall motion abnormalities. The left ventricular cavity size is normal. Spectral Doppler shows a normal pattern of left ventricular diastolic filling.  Left Atrium: The left atrium is normal in size.  Right Ventricle: The right ventricle is normal in size. There is normal right ventricular global systolic function.  Right Atrium: The right atrium is normal in size.  Aortic Valve: There is no visualized aortic valve vegetation. There is a prosthetic aortic valve present. There is bioprosthetic aortic valve. There is no evidence of aortic valve regurgitation. The peak instantaneous gradient of the aortic valve is 33.2 mmHg. The mean gradient of the aortic valve is 19.0 mmHg.  Mitral Valve: There is a prosthetic mitral valve present. There is a normally functioning mitral valve prosthesis. There was no mitral valve vegetation visualized. There is trace mitral valve regurgitation.  Tricuspid Valve: The tricuspid valve is structurally normal. There is trace tricuspid regurgitation.  Pulmonic Valve: The pulmonic valve is not well visualized. The pulmonic valve regurgitation was not well visualized.  Pericardium: There is no pericardial effusion noted.  Aorta: The aortic root is normal.       CONCLUSIONS:   1. Left ventricular systolic function is normal with a 60-65% estimated ejection fraction.   2. No mitral valve vegetation visualized.   3. There is a normally functioning mitral valve prosthesis.   4. No aortic valve vegetation visualized.   5. There is a bioprosthetic aortic valve.    QUANTITATIVE DATA  SUMMARY:  2D MEASUREMENTS:                           Normal Ranges:  IVSd:          0.85 cm   (0.6-1.1cm)  LVPWd:         0.72 cm   (0.6-1.1cm)  LVIDd:         3.87 cm   (3.9-5.9cm)  LV Mass Index: 50.5 g/m2    LV SYSTOLIC FUNCTION BY 2D PLANIMETRY (MOD):                      Normal Ranges:  EF-A4C View: 60.9 % (>=55%)  EF-A2C View: 65.0 %  EF-Biplane:  62.9 %    LV DIASTOLIC FUNCTION:                      Normal Ranges:  MV Peak E: 1.87 m/s (0.7-1.2 m/s)  MV Peak A: 1.01 m/s (0.42-0.7 m/s)  E/A Ratio: 1.85     (1.0-2.2)    MITRAL VALVE:                        Normal Ranges:  MV Vmax:    1.78 m/s  (<=1.3m/s)  MV peak P.7 mmHg (<5mmHg)  MV mean P.0 mmHg  (<48mmHg)  MV DT:      320 msec  (150-240msec)    AORTIC VALVE:                                     Normal Ranges:  AoV Vmax:                2.88 m/s  (<=1.7m/s)  AoV Peak P.2 mmHg (<20mmHg)  AoV Mean P.0 mmHg (1.7-11.5mmHg)  LVOT Max Jamarcus:            1.12 m/s  (<=1.1m/s)  AoV VTI:                 62.50 cm  (18-25cm)  LVOT VTI:                21.00 cm  LVOT Diameter:           2.00 cm   (1.8-2.4cm)  AoV Area, VTI:           1.06 cm2  (2.5-5.5cm2)  AoV Area,Vmax:           1.22 cm2  (2.5-4.5cm2)  AoV Dimensionless Index: 0.34    TRICUSPID VALVE/RVSP:                              Normal Ranges:  Peak TR Velocity: 2.84 m/s  RV Syst Pressure: 35.3 mmHg (< 30mmHg)  IVC Diam:         1.28 cm       [id number] Fabian Skaggs DO  Electronically signed on 2024 at 11:38:48 AM       ** Final **      Physical Exam  General: alert, no diaphoresis   Lungs: CTA BL   Heart: RRR, +murmur ,no LE edema BL   GI: abdomen soft, nontender, nondistended, BS present   MSK: no joint effusion or deformity   Skin: scabs noted on BL legs and pitting scars noted on UE and LE    Neuro: grossly normal cognition, motor strength, sensation    Relevant Results             Results for orders placed or performed during the hospital encounter of 24 (from the  past 24 hour(s))   Vancomycin   Result Value Ref Range    Vancomycin 26.1 (H) 10.0 - 20.0 ug/mL   Blood Culture    Specimen: Peripheral Venipuncture; Blood culture   Result Value Ref Range    Blood Culture Loaded on Instrument - Culture in progress    Blood Culture    Specimen: Peripheral Venipuncture; Blood culture   Result Value Ref Range    Blood Culture Loaded on Instrument - Culture in progress    Basic Metabolic Panel   Result Value Ref Range    Glucose 97 65 - 99 mg/dL    Sodium 139 133 - 145 mmol/L    Potassium 4.3 3.4 - 5.1 mmol/L    Chloride 96 (L) 97 - 107 mmol/L    Bicarbonate 25 24 - 31 mmol/L    Urea Nitrogen 22 8 - 25 mg/dL    Creatinine 4.30 (H) 0.40 - 1.60 mg/dL    eGFR 12 (L) >60 mL/min/1.73m*2    Calcium 8.4 (L) 8.5 - 10.4 mg/dL    Anion Gap 18 <=19 mmol/L   CBC   Result Value Ref Range    WBC 5.7 4.4 - 11.3 x10*3/uL    nRBC 0.0 0.0 - 0.0 /100 WBCs    RBC 3.56 (L) 4.00 - 5.20 x10*6/uL    Hemoglobin 8.1 (L) 12.0 - 16.0 g/dL    Hematocrit 28.1 (L) 36.0 - 46.0 %    MCV 79 (L) 80 - 100 fL    MCH 22.8 (L) 26.0 - 34.0 pg    MCHC 28.8 (L) 32.0 - 36.0 g/dL    RDW 17.1 (H) 11.5 - 14.5 %    Platelets 205 150 - 450 x10*3/uL   Transesophageal Echo (LAURA)   Result Value Ref Range    BSA 1.72 m2     Scheduled medications  aspirin, 81 mg, oral, Daily  atorvastatin, 40 mg, oral, Nightly  buPROPion, 100 mg, oral, Every other day  calcitriol, 0.5 mcg, oral, Daily  cloNIDine, 0.1 mg, oral, q8h KIMBERLY  epoetin brandy or biosimilar, 100 Units/kg, subcutaneous, Once per day on Mon Wed Fri  heparin (porcine), 5,000 Units, subcutaneous, q8h KIMBERLY  heparin, 1,000 Units, intra-catheter, After Dialysis  heparin, 1,000 Units, intra-catheter, After Dialysis  hydrALAZINE, 50 mg, oral, TID  levETIRAcetam, 500 mg, oral, Nightly  metoprolol succinate XL, 50 mg, oral, BID  pregabalin, 25 mg, oral, BID  rifAMPin, 300 mg, oral, q8h  [START ON 1/13/2024] vancomycin, 750 mg, intravenous, After Dialysis      Continuous medications  oxygen, ,  Last Rate: 15 L/min (01/12/24 0852)    PRN medications  PRN medications: acetaminophen, butamben-tetracaine-benzocaine, diphenhydrAMINE, morphine, oxyCODONE-acetaminophen, oxygen, promethazine, zolpidem      Assessment/Plan                  Principal Problem:    Hyperkalemia  Active Problems:    End-stage renal disease on hemodialysis (CMS/[facility])      MRSA bacteremia  - positive cultures 1/8 and 1/9  - concern for relapse of prosthetic valve endocarditis or recurrent infection of dialysis catheter, or both  - Dr. Soto has added rifampin. On vancomycin IV  - discussed with Dr. Skaggs; LAURA today showing improved vegetation size from prior LAURA; there was previously another vegetation on the ventricular side of the valve seen in September that is now gone. Small perforation of the valve associated with the vegetation with minimal regurgitation.  - permacath infected, will need changed out but timing complicated by her poor vascular access history. She has been told previously she has no other sites to use and if we were to pull this permacath, it would likely need exchanged over guidewire- however this does not allow good line clearance to try to clear blood cultures in meantime. Nephrology, ID, IR, vascular surgery have been in communication with plan for IR procedure today for removal of dialysis line vs replacement with a temporary'      Hyperkalemia  ESRD  Dislodged permacath  - Vascular surgery consulted, they stabilized the line and said okay to use.   - plans for new line with IR, as above  - Dr. Pedersen managing     H/o endocarditis-- see above  - with bioprosthetic valves, recently abx stopped. Normally follows with Dr. Soto.      HTN  - continue home meds-- add parameters today     Depression  - continue buproprion     CAD  - statin, aspirin, beta blocker     Chronic pain  - continue patient's normal pain meds    Itching  - benadryl PRN     Acute on chronic anemia of chronic renal disease  - s/p 1 unit prbc 1/10  with good improvement of hemoglobin    DVT ppx              Kinza Weiner DO

## 2024-01-12 NOTE — PROGRESS NOTES
INFECTIOUS DISEASES PROGRESS NOTE    Consulted / following patient for:  Recurrent Staph aureus septicemia    Subjective   Interval History:   Feels okay, only complaint is pruritus, which she attributes to rifampin, but it is manageable with diphenhydramine and she does not want to stop rifampin       Objective   PHYSICAL EXAMINATION  Vital signs:  Visit Vitals  BP (!) 118/47 (BP Location: Left leg, Patient Position: Lying) Comment: Simultaneous filing. User may not have seen previous data.   Pulse 91 Comment: Simultaneous filing. User may not have seen previous data.   Temp 36.3 °C (97.3 °F) (Temporal)   Resp 18 Comment: Simultaneous filing. User may not have seen previous data.      General: Alert, not toxic, no acute distress  HEENT:  No scleral icterus or conjunctival suffusion, oral mucosa moist  Lungs:  Clear to auscultation  Heart:  S1, S2 normal, no change in systolic murmur  Abdomen:  Soft, nontender. No palpable organs or masses  Skin and mucosa: No peripheral signs of infectious endocarditis      Relevant Results  WBC: 7200   Post-dialysis vancomycin level 1/11: 26.7  Results from last 72 hours   Lab Units 01/12/24  0452   CREATININE mg/dL 4.30*   ANION GAP mmol/L 18   EGFR mL/min/1.73m*2 12*           Microbiology:  Blood (1/8): MRSA X2  Blood (1/9): MRSA X2  Blood (1/11, peripheral): Pending X2    Imaging:  TTE: No vegetations  LAURA: Preliminary per Dr. Skaggs no vegetation visualized on the dialysis catheter, some thickening of the mitral valve annulus but no definitive vegetation      ASSESSMENT:  MRSA bacteremia  Patient has recurrent MRSA septicemia which either represents a relapse of her prosthetic valve endocarditis, recurrent infection of her dialysis catheter, or both.  There is no evidence for heart failure or peripheral embolic disease and she is hemodynamically stable.  Blood cultures remain positive.  Vascular Surgery has been consulted, and I have discussed in detail with Mr. Fitzgerald, as I  believe that it is critical that this dialysis catheter be removed.  The possible option of switching to peritoneal dialysis also needs to be given serious consideration, but until the bloodstream is clear a peritoneal dialysis catheter cannot be placed.       Prosthetic mitral valve endocarditis, September 2023  See above.  No definitive evidence for recurrence, awaiting final LAURA interpretation and awaiting further incubation of peripheral blood cultures drawn on 1/11     Chronic renal failure  Patient is running out of viable options for placement of new dialysis catheters, and vascular surgery consultation is pending (regarding potential creation of an AV fistula) as is ongoing consideration for possible transition to peritoneal dialysis        PLANS:  -   Vancomycin 750 mg after every dialysis  -   Continue rifampin 300 mg p.o. every 8 hours  -   Wait further incubation of peripheral blood cultures drawn 1/11  -   Await final LAURA interpretation  -   Await dialysis catheter removal, will consult IR  -   Await further consideration of the option of peritoneal dialysis    Discussed in detail with Dr. Giles Soto MD  [facility] [employer]  Office:  [phone]

## 2024-01-13 ENCOUNTER — APPOINTMENT (OUTPATIENT)
Dept: DIALYSIS | Facility: HOSPITAL | Age: 49
End: 2024-01-13
Payer: MEDICARE

## 2024-01-13 LAB
ANION GAP SERPL CALC-SCNC: 18 MMOL/L
BACTERIA BLD AEROBE CULT: ABNORMAL
BACTERIA BLD CULT: ABNORMAL
BUN SERPL-MCNC: 30 MG/DL (ref 8–25)
CALCIUM SERPL-MCNC: 7.9 MG/DL (ref 8.5–10.4)
CHLORIDE SERPL-SCNC: 96 MMOL/L (ref 97–107)
CO2 SERPL-SCNC: 24 MMOL/L (ref 24–31)
CREAT SERPL-MCNC: 6.4 MG/DL (ref 0.4–1.6)
EGFRCR SERPLBLD CKD-EPI 2021: 7 ML/MIN/1.73M*2
ERYTHROCYTE [DISTWIDTH] IN BLOOD BY AUTOMATED COUNT: 17.2 % (ref 11.5–14.5)
GLUCOSE SERPL-MCNC: 90 MG/DL (ref 65–99)
GRAM STN SPEC: ABNORMAL
GRAM STN SPEC: ABNORMAL
HCT VFR BLD AUTO: 27.3 % (ref 36–46)
HGB BLD-MCNC: 7.7 G/DL (ref 12–16)
MCH RBC QN AUTO: 22.8 PG (ref 26–34)
MCHC RBC AUTO-ENTMCNC: 28.2 G/DL (ref 32–36)
MCV RBC AUTO: 81 FL (ref 80–100)
NRBC BLD-RTO: 0 /100 WBCS (ref 0–0)
PLATELET # BLD AUTO: 204 X10*3/UL (ref 150–450)
POTASSIUM SERPL-SCNC: 4.3 MMOL/L (ref 3.4–5.1)
RBC # BLD AUTO: 3.38 X10*6/UL (ref 4–5.2)
SODIUM SERPL-SCNC: 138 MMOL/L (ref 133–145)
WBC # BLD AUTO: 6.9 X10*3/UL (ref 4.4–11.3)

## 2024-01-13 PROCEDURE — 36415 COLL VENOUS BLD VENIPUNCTURE: CPT | Performed by: HOSPITALIST

## 2024-01-13 PROCEDURE — 85027 COMPLETE CBC AUTOMATED: CPT | Performed by: HOSPITALIST

## 2024-01-13 PROCEDURE — 99232 SBSQ HOSP IP/OBS MODERATE 35: CPT | Performed by: NURSE PRACTITIONER

## 2024-01-13 PROCEDURE — 36415 COLL VENOUS BLD VENIPUNCTURE: CPT | Performed by: INTERNAL MEDICINE

## 2024-01-13 PROCEDURE — 8010000001 HC DIALYSIS - HEMODIALYSIS PER DAY

## 2024-01-13 PROCEDURE — 2500000004 HC RX 250 GENERAL PHARMACY W/ HCPCS (ALT 636 FOR OP/ED): Performed by: INTERNAL MEDICINE

## 2024-01-13 PROCEDURE — 87040 BLOOD CULTURE FOR BACTERIA: CPT | Mod: WESLAB | Performed by: INTERNAL MEDICINE

## 2024-01-13 PROCEDURE — 1200000002 HC GENERAL ROOM WITH TELEMETRY DAILY

## 2024-01-13 PROCEDURE — 2500000001 HC RX 250 WO HCPCS SELF ADMINISTERED DRUGS (ALT 637 FOR MEDICARE OP): Performed by: INTERNAL MEDICINE

## 2024-01-13 PROCEDURE — 2500000004 HC RX 250 GENERAL PHARMACY W/ HCPCS (ALT 636 FOR OP/ED): Performed by: HOSPITALIST

## 2024-01-13 PROCEDURE — 80048 BASIC METABOLIC PNL TOTAL CA: CPT | Performed by: HOSPITALIST

## 2024-01-13 RX ORDER — HEPARIN SODIUM 1000 [USP'U]/ML
1000 INJECTION, SOLUTION INTRAVENOUS; SUBCUTANEOUS
Status: DISCONTINUED | OUTPATIENT
Start: 2024-01-13 | End: 2024-01-15 | Stop reason: ALTCHOICE

## 2024-01-13 RX ADMIN — MORPHINE SULFATE 2 MG: 2 INJECTION, SOLUTION INTRAMUSCULAR; INTRAVENOUS at 12:09

## 2024-01-13 RX ADMIN — MORPHINE SULFATE 2 MG: 2 INJECTION, SOLUTION INTRAMUSCULAR; INTRAVENOUS at 03:38

## 2024-01-13 RX ADMIN — DIPHENHYDRAMINE HYDROCHLORIDE 25 MG: 50 INJECTION, SOLUTION INTRAMUSCULAR; INTRAVENOUS at 08:22

## 2024-01-13 RX ADMIN — PREGABALIN 25 MG: 25 CAPSULE ORAL at 22:58

## 2024-01-13 RX ADMIN — MORPHINE SULFATE 2 MG: 2 INJECTION, SOLUTION INTRAMUSCULAR; INTRAVENOUS at 08:22

## 2024-01-13 RX ADMIN — ATORVASTATIN CALCIUM 40 MG: 40 TABLET, FILM COATED ORAL at 22:53

## 2024-01-13 RX ADMIN — DIPHENHYDRAMINE HYDROCHLORIDE 25 MG: 50 INJECTION, SOLUTION INTRAMUSCULAR; INTRAVENOUS at 03:38

## 2024-01-13 RX ADMIN — DIPHENHYDRAMINE HYDROCHLORIDE 25 MG: 50 INJECTION, SOLUTION INTRAMUSCULAR; INTRAVENOUS at 12:09

## 2024-01-13 RX ADMIN — DIPHENHYDRAMINE HYDROCHLORIDE 25 MG: 50 INJECTION, SOLUTION INTRAMUSCULAR; INTRAVENOUS at 19:53

## 2024-01-13 RX ADMIN — VANCOMYCIN 750 MG: 750 INJECTION, SOLUTION INTRAVENOUS at 20:24

## 2024-01-13 RX ADMIN — LEVETIRACETAM 500 MG: 500 TABLET, FILM COATED ORAL at 22:53

## 2024-01-13 RX ADMIN — MORPHINE SULFATE 2 MG: 2 INJECTION, SOLUTION INTRAMUSCULAR; INTRAVENOUS at 19:53

## 2024-01-13 RX ADMIN — METOPROLOL SUCCINATE 50 MG: 50 TABLET, EXTENDED RELEASE ORAL at 22:53

## 2024-01-13 ASSESSMENT — COGNITIVE AND FUNCTIONAL STATUS - GENERAL
MOBILITY SCORE: 24
MOBILITY SCORE: 24
DAILY ACTIVITIY SCORE: 24
DAILY ACTIVITIY SCORE: 24

## 2024-01-13 ASSESSMENT — PAIN - FUNCTIONAL ASSESSMENT
PAIN_FUNCTIONAL_ASSESSMENT: 0-10
PAIN_FUNCTIONAL_ASSESSMENT: FLACC (FACE, LEGS, ACTIVITY, CRY, CONSOLABILITY)

## 2024-01-13 ASSESSMENT — PAIN SCALES - GENERAL
PAINLEVEL_OUTOF10: 0 - NO PAIN
PAINLEVEL_OUTOF10: 9

## 2024-01-13 ASSESSMENT — PAIN DESCRIPTION - DESCRIPTORS: DESCRIPTORS: ACHING;SORE

## 2024-01-13 ASSESSMENT — PAIN DESCRIPTION - LOCATION: LOCATION: GENERALIZED

## 2024-01-13 NOTE — PROGRESS NOTES
INFECTIOUS DISEASES PROGRESS NOTE    Consulted / following patient for:  Recurrent Staph aureus septicemia    Subjective   Interval History:   Feels okay, no complaints       Objective   PHYSICAL EXAMINATION  Vital signs:  Visit Vitals  BP (!) 143/42 (BP Location: Left leg, Patient Position: Lying)   Pulse 88   Temp 36 °C (96.8 °F) (Temporal)   Resp 15      General: Alert, not toxic, no acute distress  Chest: Tunneled dialysis catheter was replaced over a wire on 1/12.  Site unremarkable.  Lungs clear  Heart:  S1, S2 normal, no change in systolic murmur  Abdomen:  Soft, nontender. No palpable organs or masses  Skin and mucosa: No peripheral signs of infectious endocarditis      Relevant Results  WBC: 6900   Post-dialysis vancomycin level 1/11: 26.7  Results from last 72 hours   Lab Units 01/13/24  0443   CREATININE mg/dL 6.40*   ANION GAP mmol/L 18   EGFR mL/min/1.73m*2 7*           Microbiology:  Blood (1/8): MRSA X2  Blood (1/9): MRSA X2  Blood (1/11, peripheral): Negative X2  Dialysis catheter tip (1/12): I requested that this be submitted for culture but it appears that was not done    Imaging:  TTE: No vegetations  LAURA: There is a mobile echodensity at the base of the anterior leaflet suspicious for vegetation, decreased in size in comparison to 9/2023 study. There is a small perforation of this leaflet associated but overall the valve integrity is fairly well preserved.  No aortic valve vegetation visualized.      ASSESSMENT:  MRSA bacteremia  Patient has recurrent MRSA septicemia which either represents a relapse of her prosthetic valve endocarditis, recurrent infection of her dialysis catheter, or both.  There is no evidence for heart failure or peripheral embolic disease and she is hemodynamically stable.  Blood cultures elected peripherally on 1/11 are sterile thus far.  Dialysis catheter was changed over a wire on 1/12.  At this point we need to determine whether we have achieved sterility of her  "bloodstream     Prosthetic mitral valve endocarditis, September 2023  See above.  Dynamically stable    Chronic renal failure  Patient is running out of viable options for placement of new dialysis catheters, and once we are able to demonstrate bloodstream sterility, would like to consider transition to peritoneal dialysis        PLANS:  -   Vancomycin 750 mg after every dialysis  -   Continue rifampin 300 mg p.o. every 8 hours  -   Wait further incubation of peripheral blood cultures drawn 1/11  -   Repeat blood cultures 1/13 through the \"new\" dialysis catheter  -   Await further consideration of the option of peritoneal dialysis     Adama Soto MD  [facility] [employer]  Office:  [phone]  "

## 2024-01-13 NOTE — CARE PLAN
Problem: Pain  Goal: Takes deep breaths with improved pain control throughout the shift  Outcome: Progressing  Goal: Turns in bed with improved pain control throughout the shift  Outcome: Progressing  Goal: Walks with improved pain control throughout the shift  Outcome: Progressing  Goal: Performs ADL's with improved pain control throughout shift  Outcome: Progressing  Goal: Participates in PT with improved pain control throughout the shift  Outcome: Progressing  Goal: Free from opioid side effects throughout the shift  Outcome: Progressing  Goal: Free from acute confusion related to pain meds throughout the shift  Outcome: Progressing     Problem: ESRD: Dialysis, CAPD  Goal: Electrolytes restored to baseline  Outcome: Progressing  Goal: Fatigue of chronic illness managed  Outcome: Progressing  Goal: Follows dialysis treatment plan  Outcome: Progressing  Goal: Follows fluid restrictions  Outcome: Progressing  Goal: Increase self care/family involvement  Outcome: Progressing  Goal: Participates in bowel regimen (CAPD)  Outcome: Progressing  Goal: Reports no abdominal pain/distension  Outcome: Progressing  Goal: Reports no shortness of breath  Outcome: Progressing     Problem: Pain - Adult  Goal: Verbalizes/displays adequate comfort level or baseline comfort level  Outcome: Progressing     Problem: Safety - Adult  Goal: Free from fall injury  Outcome: Progressing     Problem: Discharge Planning  Goal: Discharge to home or other facility with appropriate resources  Outcome: Progressing     Problem: Chronic Conditions and Co-morbidities  Goal: Patient's chronic conditions and co-morbidity symptoms are monitored and maintained or improved  Outcome: Progressing   The patient's goals for the shift include none    The clinical goals for the shift include rest    Over the shift, the patient did not make progress toward the following goals. Barriers to progression include n/a. Recommendations to address these barriers  include n/a.

## 2024-01-13 NOTE — NURSING NOTE
Assumed care of pt. Pt alert X4. Asking for her PRN pain meds, which she has been told they are not due until 0745. RA. NAD. No other needs. BSSR completed

## 2024-01-13 NOTE — PROCEDURES
Report from Sending RN:    Report From: JENNIFER MORENO  Recent Surgery of Procedure: Yes,   Baseline Level of Consciousness (LOC): A&OX4  Oxygen Use: No  Diabetic: No  Last BP Med Given Day of Dialysis:   Last Pain Med Given: MORPHINE BENADRYL  Lab Tests to be Obtained with Dialysis: Yes  Blood Transfusion to be Given During Dialysis: No  Available IV Access: Yes  Medications to be Administered During Dialysis: No  Continuous IV Infusion Running: No  Restraints on Currently or in the Last 24 Hours: No  Hand-Off Communication: Patient has a temp line that was placed yesterday, ok to come to dialysis

## 2024-01-13 NOTE — PROGRESS NOTES
Batsheva Temple is a 48 y.o. female on day 3 of admission presenting with Hyperkalemia.      Subjective   No complaints today. Chest a little sore after procedure. Had questions about LAURA results-- answered. No other complaints.           Objective     Last Recorded Vitals  /54 (BP Location: Left leg, Patient Position: Lying)   Pulse 86   Temp 35.6 °C (96.1 °F) (Temporal)   Resp 15   Wt 64.4 kg (141 lb 15.6 oz)   SpO2 99%   Intake/Output last 3 Shifts:    Intake/Output Summary (Last 24 hours) at 1/13/2024 1156  Last data filed at 1/12/2024 1704  Gross per 24 hour   Intake 300 ml   Output 15 ml   Net 285 ml         Admission Weight  Weight: 65 kg (143 lb 4.8 oz) (01/08/24 0548)    Daily Weight  01/09/24 : 64.4 kg (141 lb 15.6 oz)    Image Results  Transesophageal Echo (LAURA)             [place] [address]             Phone [phone]    TRANSESOPHAGEAL ECHOCARDIOGRAM REPORT       Patient Name:     BATSHEVA TEMPLE Reading Physician:   [id number] Flores DO  Study Date:       1/12/2024            Ordering Provider:   [id number] FLORES  MRN/PID:          [id number]             Fellow:  Accession#:       [id number]         Nurse:  Date of           1975 / [birth year]      Sonographer:         Dionte Vasquez RDCS  Birth/Age:        years  Gender:           F                    Additional Staff:  Height:           165.00 cm            Admit Date:  Weight:           65.00 kg             Admission Status:    Inpatient - Routine  BSA:              1.72 m2              Department Location: [facility]  Blood Pressure: 142 /50 mmHg    Study Type:    TRANSESOPHAGEAL ECHO (LAURA)  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 ESRD, Pacemaker, s/p                      Redo-sternotomy with TVr-ring repair, AVR #21 Inspirus, MVR                     #27 [facility] [employer] Epic Bioprosthesis.    Study Detail: The following Echo studies were performed: 2D, color flow and                Doppler.       PHYSICIAN INTERPRETATION:  LAURA Details: The LAURA probe used was [id number]. Technically adequate omniplane transesophageal echocardiogram performed. Color flow Doppler echo was performed to assess for the presence of a patent foramen ovale.  LAURA Medication: The patient was sedated by Anesthesia; please refer to anesthesia flow sheet for medications used.  LAURA Procedure: The probe was passed without difficulty.  Left Ventricle: Left ventricular systolic function is normal. There are no regional wall motion abnormalities. The left ventricular cavity size is normal. Left ventricular diastolic filling was not assessed.  Left Atrium: The left atrium is normal in size. There is a normal sized left atrial appendage, the left atrial appendage Doppler velocities are normal and there is no thrombus visualized in the left atrial appendage.  Right Ventricle: The right ventricle is normal in size. There is normal right ventricular global systolic function.  Right Atrium: The right atrium is normal in size.  Aortic Valve: There is no visualized aortic valve vegetation. There is a prosthetic aortic valve present. There is bioprosthetic aortic valve. There is no evidence of aortic valve regurgitation.  Mitral Valve: There is a prosthetic mitral valve present. There is a mitral valve bioprosthesis. There is trace mitral valve regurgitation. There is a mobile echodensity at the base of the anterior leaflet suspicious for vegetation, decreased in size in comparison to 9/2023 study. There is a small perforation of this leaflet associated but overall the valve integrity is fairly well preserved.  Tricuspid Valve: The tricuspid valve is structurally normal. There is trace tricuspid regurgitation.  Pulmonic Valve: The pulmonic  valve was not assessed. The pulmonic valve regurgitation was not assessed.  Pericardium: There is no pericardial effusion noted.  Aorta: The aortic root is normal.       CONCLUSIONS:   1. Left ventricular systolic function is normal.   2. There is a mobile echodensity at the base of the anterior leaflet suspicious for vegetation, decreased in size in comparison to 9/2023 study. There is a small perforation of this leaflet associated but overall the valve integrity is fairly well preserved.   3. No aortic valve vegetation visualized.   4. There is a bioprosthetic aortic valve.    QUANTITATIVE DATA SUMMARY:     [id number] Fabian Skaggs DO  Electronically signed on 1/12/2024 at 1:32:47 PM       ** Final **      Physical Exam  General: alert, no diaphoresis   Lungs: CTA BL   Heart: RRR, +murmur ,no LE edema BL   GI: abdomen soft, nontender, nondistended, BS present   MSK: no joint effusion or deformity   Skin: scabs noted on BL legs and pitting scars noted on UE and LE    Neuro: grossly normal cognition, motor strength, sensation    Relevant Results             Results for orders placed or performed during the hospital encounter of 01/08/24 (from the past 24 hour(s))   Basic Metabolic Panel   Result Value Ref Range    Glucose 90 65 - 99 mg/dL    Sodium 138 133 - 145 mmol/L    Potassium 4.3 3.4 - 5.1 mmol/L    Chloride 96 (L) 97 - 107 mmol/L    Bicarbonate 24 24 - 31 mmol/L    Urea Nitrogen 30 (H) 8 - 25 mg/dL    Creatinine 6.40 (H) 0.40 - 1.60 mg/dL    eGFR 7 (L) >60 mL/min/1.73m*2    Calcium 7.9 (L) 8.5 - 10.4 mg/dL    Anion Gap 18 <=19 mmol/L   CBC   Result Value Ref Range    WBC 6.9 4.4 - 11.3 x10*3/uL    nRBC 0.0 0.0 - 0.0 /100 WBCs    RBC 3.38 (L) 4.00 - 5.20 x10*6/uL    Hemoglobin 7.7 (L) 12.0 - 16.0 g/dL    Hematocrit 27.3 (L) 36.0 - 46.0 %    MCV 81 80 - 100 fL    MCH 22.8 (L) 26.0 - 34.0 pg    MCHC 28.2 (L) 32.0 - 36.0 g/dL    RDW 17.2 (H) 11.5 - 14.5 %    Platelets 204 150 - 450 x10*3/uL     Scheduled  medications  aspirin, 81 mg, oral, Daily  atorvastatin, 40 mg, oral, Nightly  buPROPion, 100 mg, oral, Every other day  calcitriol, 0.5 mcg, oral, Daily  cloNIDine, 0.1 mg, oral, q8h KIMBERLY  epoetin brandy or biosimilar, 100 Units/kg, subcutaneous, Once per day on Mon Wed Fri  heparin (porcine), 5,000 Units, subcutaneous, q8h KIMBERLY  heparin, 1,000 Units, intra-catheter, After Dialysis  heparin, 1,000 Units, intra-catheter, After Dialysis  heparin, 1,000 Units, intra-catheter, After Dialysis  heparin, 1,000 Units, intra-catheter, After Dialysis  hydrALAZINE, 50 mg, oral, TID  levETIRAcetam, 500 mg, oral, Nightly  lidocaine, 0.1 mL, subcutaneous, Once  metoprolol succinate XL, 50 mg, oral, BID  pregabalin, 25 mg, oral, BID  rifAMPin, 300 mg, oral, q8h  vancomycin, 750 mg, intravenous, After Dialysis      Continuous medications  lactated Ringer's, 100 mL/hr  oxygen, , Last Rate: 15 L/min (01/12/24 0839)    PRN medications  PRN medications: acetaminophen, butamben-tetracaine-benzocaine, diphenhydrAMINE, fentaNYL PF, iodixanol, labetaloL, lidocaine PF, morphine, oxyCODONE-acetaminophen, oxygen, promethazine, zolpidem      Assessment/Plan                  Principal Problem:    Hyperkalemia  Active Problems:    End-stage renal disease on hemodialysis (CMS/[facility])      MRSA bacteremia  - positive cultures 1/8 and 1/9. 1/11 NGTD. Repeat cultures to be drawn today from the new temporary line.  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 Soto has added rifampin. On vancomycin IV  - discussed with Dr. Skaggs; LAURA today showing improved vegetation size from prior LAURA; there was previously another vegetation on the ventricular side of the valve seen in September that is now gone. Small perforation of the valve associated with the vegetation with minimal regurgitation.  - patient's prior permacath was changed out 1/12 over guidewire, now with temporary catheter in that  location. New cultures to be done today.        Hyperkalemia  ESRD  - now with temporary dialysis line  - Dr. Pedersen managing     H/o endocarditis-- see above  - with bioprosthetic valves, recently abx stopped. Normally follows with Dr. Soto.      HTN  - continue home meds-- add parameters today     Depression  - continue buproprion     CAD  - statin, aspirin, beta blocker     Chronic pain  - continue patient's normal pain meds    Itching  - benadryl PRN     Acute on chronic anemia of chronic renal disease  - never received blood transfusion; her hemoglobin stabilized without transfusion. Consent in chart.    DVT ppx              Kinza Weiner DO

## 2024-01-13 NOTE — NURSING NOTE
Patient arrived to unit from dialysis. Patient is a transfer from [facility]. Patient admitted to 428B. Fall precautions reviewed and implemented. Bed brakes locked, bed in lowest position, call light within reach, bed alarm activated. Will continue to monitor patient to ensure patient safety. Patient care passed on to oncoming shift.

## 2024-01-13 NOTE — PROGRESS NOTES
"Batsheva Page is a 48 y.o. female on day 3 of admission presenting with Hyperkalemia.    Subjective   Patient seen for end-stage kidney disease she was admitted with bacteremia with MRSA which was a line infection yesterday she had to change her catheter for guidewire done by interventional radiology she has no fever she feels well going for dialysis today       Objective     Physical Exam  Neck:      Vascular: No carotid bruit.   Cardiovascular:      Rate and Rhythm: Normal rate and regular rhythm.      Heart sounds: No murmur heard.     No friction rub. No gallop.   Pulmonary:      Breath sounds: No wheezing, rhonchi or rales.   Chest:      Chest wall: No tenderness.   Abdominal:      General: There is no distension.      Tenderness: There is no abdominal tenderness. There is no guarding or rebound.   Musculoskeletal:         General: No swelling or tenderness.      Cervical back: Neck supple.      Right lower leg: No edema.      Left lower leg: No edema.   Lymphadenopathy:      Cervical: No cervical adenopathy.         Last Recorded Vitals  Blood pressure (!) 117/40, pulse 90, temperature 37.6 °C (99.7 °F), temperature source Temporal, resp. rate 16, height 1.651 m (5' 5\"), weight 64.4 kg (141 lb 15.6 oz), SpO2 100 %.    Intake/Output last 3 Shifts:  I/O last 3 completed shifts:  In: 572 (8.9 mL/kg) [P.O.:222; I.V.:350 (5.4 mL/kg)]  Out: 15 (0.2 mL/kg) [Blood:15]  Weight: 64.4 kg     Current Facility-Administered Medications:     acetaminophen (Tylenol) tablet 650 mg, 650 mg, oral, q6h PRN, Fabian Skaggs DO    aspirin EC tablet 81 mg, 81 mg, oral, Daily, Fabian Skaggs DO, 81 mg at 01/08/24 1142    atorvastatin (Lipitor) tablet 40 mg, 40 mg, oral, Nightly, Fabian Skaggs DO, 40 mg at 01/11/24 2126    buPROPion (Wellbutrin) tablet 100 mg, 100 mg, oral, Every other day, Fabian Skaggs DO    butamben-tetracaine-benzocaine (Cetacaine) spray, , , PRN, Fabian Skaggs DO, 1 spray at 01/12/24 0834    " calcitriol (Rocaltrol) capsule 0.5 mcg, 0.5 mcg, oral, Daily, Fabian Skaggs DO, 0.5 mcg at 01/10/24 0812    cloNIDine (Catapres) tablet 0.1 mg, 0.1 mg, oral, q8h KIMBERLY, Fabian Skaggs DO, 0.1 mg at 01/12/24 2120    diphenhydrAMINE (BENADryl) injection 25 mg, 25 mg, intravenous, q4h PRN, Fabian Skaggs DO, 25 mg at 01/13/24 1209    epoetin brandy-epbx (RETACRIT) injection 6,000 Units, 100 Units/kg, subcutaneous, Once per day on Mon Wed Fri, Fabian Skaggs DO, 6,000 Units at 01/10/24 1130    fentaNYL PF (Sublimaze) injection 50 mcg, 50 mcg, intravenous, q5 min PRN, Masoud Serrato MD, 50 mcg at 01/12/24 1728    heparin (porcine) injection 5,000 Units, 5,000 Units, subcutaneous, q8h KIMBERLY, Fabian Skaggs DO    heparin 1,000 unit/mL injection 1,000 Units, 1,000 Units, intra-catheter, After Dialysis, Fabian Skaggs DO, 2,100 Units at 01/11/24 1148    heparin 1,000 unit/mL injection 1,000 Units, 1,000 Units, intra-catheter, After Dialysis, Fabian Skaggs DO, 2,100 Units at 01/11/24 1147    heparin 1,000 unit/mL injection 1,000 Units, 1,000 Units, intra-catheter, After Dialysis, Zhou Pedersen MD    heparin 1,000 unit/mL injection 1,000 Units, 1,000 Units, intra-catheter, After Dialysis, Zhou Pedersen MD    hydrALAZINE (Apresoline) tablet 50 mg, 50 mg, oral, TID, Fabian Skaggs DO, 50 mg at 01/12/24 2120    iodixanol (VISIPaque) 320 mg iodine/mL injection, , , PRN, Chris Lott MD, 10 mL at 01/12/24 1659    labetaloL (Normodyne,Trandate) injection 5 mg, 5 mg, intravenous, Once PRN, Masoud Serrato MD    lactated Ringer's infusion, 100 mL/hr, intravenous, Continuous, Masoud Serrato MD    levETIRAcetam (Keppra) tablet 500 mg, 500 mg, oral, Nightly, Fabian Skaggs DO, 500 mg at 01/11/24 2126    lidocaine (Xylocaine) 10 mg/mL (1 %) injection 1 mg, 0.1 mL, subcutaneous, Once, Masoud Serrato MD    lidocaine PF (Xylocaine) 10 mg/mL (1 %) injection, , , PRN, Chris Lott MD, 5 mL at 01/12/24 7294     metoprolol succinate XL (Toprol-XL) 24 hr tablet 50 mg, 50 mg, oral, BID, Fabian Skaggs DO, 50 mg at 01/11/24 2127    morphine injection 2 mg, 2 mg, intravenous, q4h PRN, Fabian Skaggs DO, 2 mg at 01/13/24 1209    oxyCODONE-acetaminophen (Percocet) 5-325 mg per tablet 1 tablet, 1 tablet, oral, q6h PRN, Fabian Skaggs DO    oxygen (O2) therapy, , , Continuous PRN, Fabian Skaggs DO, Last Rate: 900,000 mL/hr at 01/12/24 0839, 15 L/min at 01/12/24 0839    pregabalin (Lyrica) capsule 25 mg, 25 mg, oral, BID, Fabian Skaggs DO, 25 mg at 01/11/24 2127    promethazine (Phenergan) 12.5 mg in sodium chloride 0.9% 50 mL IV, 12.5 mg, intravenous, q6h PRN, Fabian Skaggs DO, 12.5 mg at 01/08/24 2152    rifAMPin (Rifadin) capsule 300 mg, 300 mg, oral, q8h, Fabian Skaggs DO, 300 mg at 01/12/24 2123    vancomycin-diluent combo no.1 (Xellia) IVPB 750 mg, 750 mg, intravenous, After Dialysis, Kinza Weiner,     zolpidem (Ambien) tablet 5 mg, 5 mg, oral, Nightly PRN, Fabian Skaggs DO, 5 mg at 01/11/24 2305   Relevant Results    Results for orders placed or performed during the hospital encounter of 01/08/24 (from the past 96 hour(s))   Blood Culture    Specimen: Peripheral Venipuncture; Blood culture   Result Value Ref Range    Blood Culture Methicillin Resistant Staphylococcus aureus (MRSA) (AA)     BLOOD CULTURE BOTTLE  Positive Aerobic and Anaerobic Bottle     Gram Stain Gram positive cocci, clusters (AA)        Susceptibility    Methicillin Resistant Staphylococcus aureus (MRSA) - MICROSCAN     Clindamycin  Susceptible      Erythromycin  Susceptible      Oxacillin  Resistant      Tetracycline  Susceptible      Trimethoprim/Sulfamethoxazole  Susceptible      Vancomycin  Susceptible    Blood Culture    Specimen: Peripheral Venipuncture; Blood culture   Result Value Ref Range    Blood Culture       Identification and susceptibility testing to follow    Blood Culture Staphylococcus aureus (AA)     BLOOD CULTURE  BOTTLE  Positive Aerobic Bottle     Gram Stain Gram positive cocci, clusters (AA)     Gram Stain Gram positive cocci, clusters (AA)    Potassium   Result Value Ref Range    Potassium 5.0 3.4 - 5.1 mmol/L   Basic Metabolic Panel   Result Value Ref Range    Glucose 93 65 - 99 mg/dL    Sodium 134 133 - 145 mmol/L    Potassium 5.0 3.4 - 5.1 mmol/L    Chloride 96 (L) 97 - 107 mmol/L    Bicarbonate 23 (L) 24 - 31 mmol/L    Urea Nitrogen 22 8 - 25 mg/dL    Creatinine 4.60 (H) 0.40 - 1.60 mg/dL    eGFR 11 (L) >60 mL/min/1.73m*2    Calcium 8.1 (L) 8.5 - 10.4 mg/dL    Anion Gap 15 <=19 mmol/L   CBC   Result Value Ref Range    WBC 6.1 4.4 - 11.3 x10*3/uL    nRBC 0.0 0.0 - 0.0 /100 WBCs    RBC 2.94 (L) 4.00 - 5.20 x10*6/uL    Hemoglobin 6.7 (L) 12.0 - 16.0 g/dL    Hematocrit 22.9 (L) 36.0 - 46.0 %    MCV 78 (L) 80 - 100 fL    MCH 22.8 (L) 26.0 - 34.0 pg    MCHC 29.3 (L) 32.0 - 36.0 g/dL    RDW 17.3 (H) 11.5 - 14.5 %    Platelets 194 150 - 450 x10*3/uL   Vancomycin   Result Value Ref Range    Vancomycin 40.8 (H) 10.0 - 20.0 ug/mL   Prepare RBC: 1 Units   Result Value Ref Range    PRODUCT CODE E2854W16     Unit Number [id number]     Unit ABO A     Unit RH NEG     XM INTEP COMP     Dispense Status XM     Blood Expiration Date January 24, 2024 23:59 EST     PRODUCT BLOOD TYPE 0600     UNIT VOLUME 350    Type and screen   Result Value Ref Range    ABO TYPE A     Rh TYPE NEG     ANTIBODY SCREEN NEG    Transthoracic Echo (TTE) Complete   Result Value Ref Range    AV pk peggy 2.88     LVOT diam 2.00     AV mn grad 19.0     MV E/A ratio 1.85     LV biplane EF 63     RVSP 35.3     LVIDd 3.87     AV pk grad 33.2     Aortic Valve Area by Continuity of VTI 1.06     Aortic Valve Area by Continuity of Peak Velocity 1.22     LV A4C EF 60.9    Basic Metabolic Panel   Result Value Ref Range    Glucose 88 65 - 99 mg/dL    Sodium 135 133 - 145 mmol/L    Potassium 5.4 (H) 3.4 - 5.1 mmol/L    Chloride 93 (L) 97 - 107 mmol/L    Bicarbonate 20 (L)  24 - 31 mmol/L    Urea Nitrogen 36 (H) 8 - 25 mg/dL    Creatinine 6.40 (H) 0.40 - 1.60 mg/dL    eGFR 7 (L) >60 mL/min/1.73m*2    Calcium 8.5 8.5 - 10.4 mg/dL    Anion Gap >19 (H) <=19 mmol/L   CBC   Result Value Ref Range    WBC 7.2 4.4 - 11.3 x10*3/uL    nRBC 0.0 0.0 - 0.0 /100 WBCs    RBC 3.60 (L) 4.00 - 5.20 x10*6/uL    Hemoglobin 8.3 (L) 12.0 - 16.0 g/dL    Hematocrit 28.1 (L) 36.0 - 46.0 %    MCV 78 (L) 80 - 100 fL    MCH 23.1 (L) 26.0 - 34.0 pg    MCHC 29.5 (L) 32.0 - 36.0 g/dL    RDW 17.2 (H) 11.5 - 14.5 %    Platelets 200 150 - 450 x10*3/uL   Vancomycin   Result Value Ref Range    Vancomycin 40.4 (H) 10.0 - 20.0 ug/mL   Vancomycin   Result Value Ref Range    Vancomycin 26.1 (H) 10.0 - 20.0 ug/mL   Blood Culture    Specimen: Peripheral Venipuncture; Blood culture   Result Value Ref Range    Blood Culture No growth at 1 day    Blood Culture    Specimen: Peripheral Venipuncture; Blood culture   Result Value Ref Range    Blood Culture No growth at 1 day    Basic Metabolic Panel   Result Value Ref Range    Glucose 97 65 - 99 mg/dL    Sodium 139 133 - 145 mmol/L    Potassium 4.3 3.4 - 5.1 mmol/L    Chloride 96 (L) 97 - 107 mmol/L    Bicarbonate 25 24 - 31 mmol/L    Urea Nitrogen 22 8 - 25 mg/dL    Creatinine 4.30 (H) 0.40 - 1.60 mg/dL    eGFR 12 (L) >60 mL/min/1.73m*2    Calcium 8.4 (L) 8.5 - 10.4 mg/dL    Anion Gap 18 <=19 mmol/L   CBC   Result Value Ref Range    WBC 5.7 4.4 - 11.3 x10*3/uL    nRBC 0.0 0.0 - 0.0 /100 WBCs    RBC 3.56 (L) 4.00 - 5.20 x10*6/uL    Hemoglobin 8.1 (L) 12.0 - 16.0 g/dL    Hematocrit 28.1 (L) 36.0 - 46.0 %    MCV 79 (L) 80 - 100 fL    MCH 22.8 (L) 26.0 - 34.0 pg    MCHC 28.8 (L) 32.0 - 36.0 g/dL    RDW 17.1 (H) 11.5 - 14.5 %    Platelets 205 150 - 450 x10*3/uL   Basic Metabolic Panel   Result Value Ref Range    Glucose 90 65 - 99 mg/dL    Sodium 138 133 - 145 mmol/L    Potassium 4.3 3.4 - 5.1 mmol/L    Chloride 96 (L) 97 - 107 mmol/L    Bicarbonate 24 24 - 31 mmol/L    Urea Nitrogen 30  (H) 8 - 25 mg/dL    Creatinine 6.40 (H) 0.40 - 1.60 mg/dL    eGFR 7 (L) >60 mL/min/1.73m*2    Calcium 7.9 (L) 8.5 - 10.4 mg/dL    Anion Gap 18 <=19 mmol/L   CBC   Result Value Ref Range    WBC 6.9 4.4 - 11.3 x10*3/uL    nRBC 0.0 0.0 - 0.0 /100 WBCs    RBC 3.38 (L) 4.00 - 5.20 x10*6/uL    Hemoglobin 7.7 (L) 12.0 - 16.0 g/dL    Hematocrit 27.3 (L) 36.0 - 46.0 %    MCV 81 80 - 100 fL    MCH 22.8 (L) 26.0 - 34.0 pg    MCHC 28.2 (L) 32.0 - 36.0 g/dL    RDW 17.2 (H) 11.5 - 14.5 %    Platelets 204 150 - 450 x10*3/uL     Impression and plan;    End-stage kidney disease    MRSA bacteremia secondary to line infection  Hyperkalemia  Malfunctioning dialysis catheter  Hypertension  Anemia of chronic kidney disease  History of recurrent endocarditis    Plan:  Continue antibiotics per infectious disease we will draw 2 sets of blood cultures today on dialysis, if these are negative she could be discharged to continue antibiotics as an outpatient had spoken to her extensively regarding peritoneal dialysis since she is getting recurrent episodes of bacteremia from the lines and also she has no veins to create AV fistula or graft                   Zhou Pedersen MD

## 2024-01-13 NOTE — NURSING NOTE
Patient resting comfortably in bed at this time, no signs of pain and or discomfort observed. No need for interventions at this time.

## 2024-01-13 NOTE — NURSING NOTE
Assumed care of patient from dayshift nurse. Patient resting comfortably in bed at this time with no complaints of pain and or discomfort. Dialysis catheter replaced in upper left chest by IR today. Dressing in place with no signs of bleeding and or infection. Bed in lowest position and locked and call bell and personal belongings within reach. No further interventions at this time.

## 2024-01-13 NOTE — NURSING NOTE
Pt being transferred to room 428. Dialysis is complete, report given. 1L removed, as pt had asked to cut her treatment 1 hour short. Blood cultures were obtained during dialysis as ordered by Brittany. Belongings sent to room 428 and report given to RN.

## 2024-01-13 NOTE — PROGRESS NOTES
"Batsheva Page is a 48 y.o. female on day 3 of admission presenting with Hyperkalemia.    Subjective   Alert and oriented x 3.  Denies complaints chest pain or pressure, palpitations or feeling rapid heart rate.  Repeat LAURA reveals improvement in previous vegetation on mitral valve.       Objective     Physical Exam  Vitals and nursing note reviewed.   Constitutional:       Appearance: She is normal weight. She is ill-appearing.   HENT:      Head: Normocephalic and atraumatic.      Nose: Nose normal.      Mouth/Throat:      Mouth: Mucous membranes are moist.   Cardiovascular:      Rate and Rhythm: Regular rhythm. Tachycardia present.      Pulses: Normal pulses.   Pulmonary:      Effort: Pulmonary effort is normal.      Breath sounds: Normal breath sounds.   Abdominal:      General: Abdomen is flat.      Palpations: Abdomen is soft.   Musculoskeletal:      Cervical back: Normal range of motion.      Right lower leg: No edema.      Left lower leg: No edema.   Skin:     Capillary Refill: Capillary refill takes less than 2 seconds.   Neurological:      Mental Status: She is alert and oriented to person, place, and time.   Psychiatric:         Mood and Affect: Mood normal.         Behavior: Behavior normal.         Thought Content: Thought content normal.         Judgment: Judgment normal.         Last Recorded Vitals  Blood pressure (!) 117/40, pulse 90, temperature 37.6 °C (99.7 °F), temperature source Temporal, resp. rate 16, height 1.651 m (5' 5\"), weight 64.4 kg (141 lb 15.6 oz), SpO2 100 %.  Intake/Output last 3 Shifts:  I/O last 3 completed shifts:  In: 572 (8.9 mL/kg) [P.O.:222; I.V.:350 (5.4 mL/kg)]  Out: 15 (0.2 mL/kg) [Blood:15]  Weight: 64.4 kg     Relevant Results  Results for orders placed or performed during the hospital encounter of 01/08/24 (from the past 24 hour(s))   Basic Metabolic Panel   Result Value Ref Range    Glucose 90 65 - 99 mg/dL    Sodium 138 133 - 145 mmol/L    Potassium 4.3 3.4 - " 5.1 mmol/L    Chloride 96 (L) 97 - 107 mmol/L    Bicarbonate 24 24 - 31 mmol/L    Urea Nitrogen 30 (H) 8 - 25 mg/dL    Creatinine 6.40 (H) 0.40 - 1.60 mg/dL    eGFR 7 (L) >60 mL/min/1.73m*2    Calcium 7.9 (L) 8.5 - 10.4 mg/dL    Anion Gap 18 <=19 mmol/L   CBC   Result Value Ref Range    WBC 6.9 4.4 - 11.3 x10*3/uL    nRBC 0.0 0.0 - 0.0 /100 WBCs    RBC 3.38 (L) 4.00 - 5.20 x10*6/uL    Hemoglobin 7.7 (L) 12.0 - 16.0 g/dL    Hematocrit 27.3 (L) 36.0 - 46.0 %    MCV 81 80 - 100 fL    MCH 22.8 (L) 26.0 - 34.0 pg    MCHC 28.2 (L) 32.0 - 36.0 g/dL    RDW 17.2 (H) 11.5 - 14.5 %    Platelets 204 150 - 450 x10*3/uL     Transesophageal Echo (LAURA)    Result Date: 1/12/2024           [place] [address]            Phone [phone] TRANSESOPHAGEAL ECHOCARDIOGRAM REPORT  Patient Name:     RITA TEMPLE Reading Physician:   [id number] Skaggs DO Study Date:       1/12/2024            Ordering Provider:   [id number] SKAGGS MRN/PID:          [id number]             Fellow: Accession#:       [id number]         Nurse: Date of           1975 / [birth year]      Sonographer:         Dionte Vasquez RDCS Birth/Age:        years Gender:           F                    Additional Staff: Height:           165.00 cm            Admit Date: Weight:           65.00 kg             Admission Status:    Inpatient - Routine BSA:              1.72 m2              Department Location: [facility] Blood Pressure: 142 /50 mmHg Study Type:    TRANSESOPHAGEAL ECHO (LAURA) Diagnosis/ICD: Viral endocarditis-B33.21 Indication:    Endocarditis CPT Codes:     LAURA Complete-[id number] Patient History: Pertinent History: HTN, Hyperlipidemia, CAD and CHF. ESRD, Pacemaker, s/p                    Redo-sternotomy with TVr-ring repair, AVR #21 Inspirus, MVR                    #27 [address] [employer] Epic Bioprosthesis. Study Detail: The following Echo studies were  performed: 2D, color flow and               Doppler.  PHYSICIAN INTERPRETATION: LAURA Details: The LAURA probe used was [id number]. Technically adequate omniplane transesophageal echocardiogram performed. Color flow Doppler echo was performed to assess for the presence of a patent foramen ovale. LAURA Medication: The patient was sedated by Anesthesia; please refer to anesthesia flow sheet for medications used. LAURA Procedure: The probe was passed without difficulty. Left Ventricle: Left ventricular systolic function is normal. There are no regional wall motion abnormalities. The left ventricular cavity size is normal. Left ventricular diastolic filling was not assessed. Left Atrium: The left atrium is normal in size. There is a normal sized left atrial appendage, the left atrial appendage Doppler velocities are normal and there is no thrombus visualized in the left atrial appendage. Right Ventricle: The right ventricle is normal in size. There is normal right ventricular global systolic function. Right Atrium: The right atrium is normal in size. Aortic Valve: There is no visualized aortic valve vegetation. There is a prosthetic aortic valve present. There is bioprosthetic aortic valve. There is no evidence of aortic valve regurgitation. Mitral Valve: There is a prosthetic mitral valve present. There is a mitral valve bioprosthesis. There is trace mitral valve regurgitation. There is a mobile echodensity at the base of the anterior leaflet suspicious for vegetation, decreased in size in comparison to 9/2023 study. There is a small perforation of this leaflet associated but overall the valve integrity is fairly well preserved. Tricuspid Valve: The tricuspid valve is structurally normal. There is trace tricuspid regurgitation. Pulmonic Valve: The pulmonic valve was not assessed. The pulmonic valve regurgitation was not assessed. Pericardium: There is no pericardial effusion noted. Aorta: The aortic root is normal.  CONCLUSIONS:   1. Left ventricular systolic function is normal.  2. There is a mobile echodensity at the base of the anterior leaflet suspicious for vegetation, decreased in size in comparison to 9/2023 study. There is a small perforation of this leaflet associated but overall the valve integrity is fairly well preserved.  3. No aortic valve vegetation visualized.  4. There is a bioprosthetic aortic valve. QUANTITATIVE DATA SUMMARY:  [id number] Fabian Skaggs DO Electronically signed on 1/12/2024 at 1:32:47 PM  ** Final **     Transthoracic Echo (TTE) Complete    Result Date: 1/11/2024           [place] [address]            Phone [phone] TRANSTHORACIC ECHOCARDIOGRAM REPORT  Patient Name:     RITA SAMY TEMPLE Reading Physician:   [id number] Fabian Skaggs DO Study Date:       1/10/2024            Ordering Provider:   [id number] MASSIEL NARANJO MRN/PID:          [id number]             Fellow: Accession#:       [id number]         Nurse: Date of           1975 / 48      Sonographer:         Lety Ivy RDCS Birth/Age:        years Gender:           F                    Additional Staff: Height:           165.10 cm            Admit Date: Weight:           63.96 kg             Admission Status:    Inpatient - Routine BSA:              1.71 m2              Department Location: [facility] Blood Pressure: 110 /36 mmHg Study Type:    TRANSTHORACIC ECHO (TTE) COMPLETE Diagnosis/ICD: Presence of prosthetic heart valve-Z95.2 Indication:    bacteremia CPT Codes:     Echo Complete w Full Doppler-[id number] Patient History: Pacer/Defib:       Permanent pacemaker Pertinent History: HTN. s/p MVR, s/p AVR,bacteremia, PAF,ESRD, Anemia,                    CABG,endocarditis. Study Detail: The following Echo studies were performed: 2D, M-Mode, color flow               and Doppler. Technically challenging study due to dialysis cath lt                parasternal.  PHYSICIAN INTERPRETATION: Left Ventricle: Left ventricular systolic function is normal, with an estimated ejection fraction of 60-65%. There are no regional wall motion abnormalities. The left ventricular cavity size is normal. Spectral Doppler shows a normal pattern of left ventricular diastolic filling. Left Atrium: The left atrium is normal in size. Right Ventricle: The right ventricle is normal in size. There is normal right ventricular global systolic function. Right Atrium: The right atrium is normal in size. Aortic Valve: There is no visualized aortic valve vegetation. There is a prosthetic aortic valve present. There is bioprosthetic aortic valve. There is no evidence of aortic valve regurgitation. The peak instantaneous gradient of the aortic valve is 33.2 mmHg. The mean gradient of the aortic valve is 19.0 mmHg. Mitral Valve: There is a prosthetic mitral valve present. There is a normally functioning mitral valve prosthesis. There was no mitral valve vegetation visualized. There is trace mitral valve regurgitation. Tricuspid Valve: The tricuspid valve is structurally normal. There is trace tricuspid regurgitation. Pulmonic Valve: The pulmonic valve is not well visualized. The pulmonic valve regurgitation was not well visualized. Pericardium: There is no pericardial effusion noted. Aorta: The aortic root is normal.  CONCLUSIONS:  1. Left ventricular systolic function is normal with a 60-65% estimated ejection fraction.  2. No mitral valve vegetation visualized.  3. There is a normally functioning mitral valve prosthesis.  4. No aortic valve vegetation visualized.  5. There is a bioprosthetic aortic valve. QUANTITATIVE DATA SUMMARY: 2D MEASUREMENTS:                          Normal Ranges: IVSd:          0.85 cm   (0.6-1.1cm) LVPWd:         0.72 cm   (0.6-1.1cm) LVIDd:         3.87 cm   (3.9-5.9cm) LV Mass Index: 50.5 g/m2 LV SYSTOLIC FUNCTION BY 2D PLANIMETRY (MOD):                     Normal  Ranges: EF-A4C View: 60.9 % (>=55%) EF-A2C View: 65.0 % EF-Biplane:  62.9 % LV DIASTOLIC FUNCTION:                     Normal Ranges: MV Peak E: 1.87 m/s (0.7-1.2 m/s) MV Peak A: 1.01 m/s (0.42-0.7 m/s) E/A Ratio: 1.85     (1.0-2.2) MITRAL VALVE:                       Normal Ranges: MV Vmax:    1.78 m/s  (<=1.3m/s) MV peak P.7 mmHg (<5mmHg) MV mean P.0 mmHg  (<48mmHg) MV DT:      320 msec  (150-240msec) AORTIC VALVE:                                    Normal Ranges: AoV Vmax:                2.88 m/s  (<=1.7m/s) AoV Peak P.2 mmHg (<20mmHg) AoV Mean P.0 mmHg (1.7-11.5mmHg) LVOT Max Jamarcus:            1.12 m/s  (<=1.1m/s) AoV VTI:                 62.50 cm  (18-25cm) LVOT VTI:                21.00 cm LVOT Diameter:           2.00 cm   (1.8-2.4cm) AoV Area, VTI:           1.06 cm2  (2.5-5.5cm2) AoV Area,Vmax:           1.22 cm2  (2.5-4.5cm2) AoV Dimensionless Index: 0.34 TRICUSPID VALVE/RVSP:                             Normal Ranges: Peak TR Velocity: 2.84 m/s RV Syst Pressure: 35.3 mmHg (< 30mmHg) IVC Diam:         1.28 cm  [id number] Fabian Skaggs DO Electronically signed on 2024 at 11:38:48 AM  ** Final **        Assessment/Plan   Principal Problem:    Hyperkalemia  Active Problems:    End-stage renal disease on hemodialysis (CMS/HCC)    Hx of Endocarditis: with bioprosthetic valve replacements  Staph Aureus Bacteremia  Hyperkalemia  End Stage Renal Disease on Dialysis  Hypertensive Disorder  Depression  Coronary Artery Disease     Impression and Plan:      1/10: Presented the emergency department with concerns for displacement of her dialysis permacath.  Patient reports some pain at the site of the catheter but denies any other symptoms.  Chest x-ray in the emergency department shows no acute cardiopulmonary processes.  Her EKG was a normal sinus rhythm with PVCs and no acute T wave or ST changes.  Blood cultures were drawn in the emergency department which were positive x 2  for gram-positive cocci.  Patient was started on vancomycin.  Patient does have a history of MRSA septicemia and was planned to be started on maintenance antibiotics however patient stopped taking her rifampin several weeks ago.  At the time of my exam patient is alert and oriented.  She is breathing comfortably on room air with pulse oximetry of 100%.  She sinus rhythm on telemetry without significant ectopy.  Blood pressures are low normal at 104/41.  Labs morning shows sodium 134, potassium 5.0, creatinine 4.60.  White blood cells are normal at 6.1.  Patient appears euvolemic on examination.  We were consulted to see the patient with concern for endocarditis and need for repeat LAURA.  Patient did have a recent LAURA in September 2023 when she presented to the hospital with similar symptoms.  This LAURA showed a normal ejection fraction of 55 to 60%.  Vegetation was noted on the mitral valve.  No aortic valve vegetation was visualized at that time. I will discuss the possibility of LAURA this admission further with Dr. Skaggs. I have also ordered a transthoracic echocardiogram to be completed today. At this time patient is refusing repeat valve replacements. Will continue to follow.      1/11: No significant changes over past 24 hours.  Patient was assessed at dialysis this morning.  He is chest pain-free and euvolemic on examination.  Breathing comfortably on room air.  Did undergo transthoracic echo yesterday, will be interpreted by cardiology team this morning.  Her blood pressure has remained stable with most recent 113/31.  Creatinine has risen to 6.4 and she is currently undergoing dialysis now.  Remains  anemic but stable with current hemoglobin of 8.3.  She continues on IV vancomycin as directed by infectious disease.  Dr. Skaggs will discuss with infectious disease and the necessity of LAURA.  Will follow with you.       1/13: Stable overnight.  Denies complaints chest pain or pressure, palpitations or feeling  rapid heart rate.  Patient did undergo LAURA yesterday which did reveal improvement in previously known vegetation on mitral valve.  Some vegetation still persist.  Overall valve function is generally well-preserved. She is euvolemic on examination.  Will go for dialysis today.  Mildly  tachycardic this morning.  Does have some palpitations however she remained in a sinus rhythm without significant ectopy otherwise.  Pressure is stable with most recent of 117/40.  Patient is anemic which is likely contributing to her tachycardia.  Overall generally stable from a cardiac perspective.  Continues to receive IV antibiotics as directed by infectious disease      I spent 35 minutes in the professional and overall care of this patient.      Arsen Barclay, APRN-CNP

## 2024-01-13 NOTE — CARE PLAN
The patient's goals for the shift include none    The clinical goals for the shift include rest    Over the shift, the patient did not make progress toward the following goals. Barriers to progression include . Recommendations to address these barriers include .

## 2024-01-13 NOTE — NURSING NOTE
Rounded on pt. Pt resting in bed. No needs. NAD. No changes. Made aware that dialysis will take place early afternoon

## 2024-01-13 NOTE — CARE PLAN
The patient's goals for the shift include none    The clinical goals for the shift include rest    Over the shift, the patient did not make progress toward the following goals. Barriers to progression include n/a. Recommendations to address these barriers include n/a.

## 2024-01-13 NOTE — NURSING NOTE
"Cared for pt today, had LAURA and dialysis catheter exchanged for new one.  Attempted to give pt medications several times today, the only thing pt took for this nurse is morphine and IV benadryl, she \"wanted to hold off on taking other meds.\"  "

## 2024-01-14 LAB
BLOOD EXPIRATION DATE: NORMAL
DISPENSE STATUS: NORMAL
PRODUCT BLOOD TYPE: 600
PRODUCT CODE: NORMAL
UNIT ABO: NORMAL
UNIT NUMBER: NORMAL
UNIT RH: NORMAL
UNIT VOLUME: 350
XM INTEP: NORMAL

## 2024-01-14 PROCEDURE — 2500000004 HC RX 250 GENERAL PHARMACY W/ HCPCS (ALT 636 FOR OP/ED): Performed by: INTERNAL MEDICINE

## 2024-01-14 PROCEDURE — 2500000001 HC RX 250 WO HCPCS SELF ADMINISTERED DRUGS (ALT 637 FOR MEDICARE OP): Performed by: INTERNAL MEDICINE

## 2024-01-14 PROCEDURE — 99232 SBSQ HOSP IP/OBS MODERATE 35: CPT | Performed by: NURSE PRACTITIONER

## 2024-01-14 PROCEDURE — 1200000002 HC GENERAL ROOM WITH TELEMETRY DAILY

## 2024-01-14 RX ORDER — DIPHENHYDRAMINE HYDROCHLORIDE 50 MG/ML
50 INJECTION INTRAMUSCULAR; INTRAVENOUS DAILY PRN
Status: DISCONTINUED | OUTPATIENT
Start: 2024-01-14 | End: 2024-01-15 | Stop reason: HOSPADM

## 2024-01-14 RX ADMIN — DIPHENHYDRAMINE HYDROCHLORIDE 25 MG: 50 INJECTION, SOLUTION INTRAMUSCULAR; INTRAVENOUS at 21:51

## 2024-01-14 RX ADMIN — MORPHINE SULFATE 2 MG: 2 INJECTION, SOLUTION INTRAMUSCULAR; INTRAVENOUS at 08:54

## 2024-01-14 RX ADMIN — METOPROLOL SUCCINATE 50 MG: 50 TABLET, EXTENDED RELEASE ORAL at 22:30

## 2024-01-14 RX ADMIN — MORPHINE SULFATE 2 MG: 2 INJECTION, SOLUTION INTRAMUSCULAR; INTRAVENOUS at 13:00

## 2024-01-14 RX ADMIN — RIFAMPIN 300 MG: 300 CAPSULE ORAL at 08:57

## 2024-01-14 RX ADMIN — MORPHINE SULFATE 2 MG: 2 INJECTION, SOLUTION INTRAMUSCULAR; INTRAVENOUS at 17:23

## 2024-01-14 RX ADMIN — DIPHENHYDRAMINE HYDROCHLORIDE 25 MG: 50 INJECTION, SOLUTION INTRAMUSCULAR; INTRAVENOUS at 08:54

## 2024-01-14 RX ADMIN — DIPHENHYDRAMINE HYDROCHLORIDE 25 MG: 50 INJECTION, SOLUTION INTRAMUSCULAR; INTRAVENOUS at 04:21

## 2024-01-14 RX ADMIN — CLONIDINE HYDROCHLORIDE 0.1 MG: 0.1 TABLET ORAL at 22:30

## 2024-01-14 RX ADMIN — PREGABALIN 25 MG: 25 CAPSULE ORAL at 06:36

## 2024-01-14 RX ADMIN — RIFAMPIN 300 MG: 300 CAPSULE ORAL at 00:29

## 2024-01-14 RX ADMIN — MORPHINE SULFATE 2 MG: 2 INJECTION, SOLUTION INTRAMUSCULAR; INTRAVENOUS at 04:20

## 2024-01-14 RX ADMIN — CLONIDINE HYDROCHLORIDE 0.1 MG: 0.1 TABLET ORAL at 13:01

## 2024-01-14 RX ADMIN — MORPHINE SULFATE 2 MG: 2 INJECTION, SOLUTION INTRAMUSCULAR; INTRAVENOUS at 00:31

## 2024-01-14 RX ADMIN — DIPHENHYDRAMINE HYDROCHLORIDE 25 MG: 50 INJECTION, SOLUTION INTRAMUSCULAR; INTRAVENOUS at 17:24

## 2024-01-14 RX ADMIN — DIPHENHYDRAMINE HYDROCHLORIDE 25 MG: 50 INJECTION, SOLUTION INTRAMUSCULAR; INTRAVENOUS at 13:00

## 2024-01-14 RX ADMIN — DIPHENHYDRAMINE HYDROCHLORIDE 25 MG: 50 INJECTION, SOLUTION INTRAMUSCULAR; INTRAVENOUS at 00:34

## 2024-01-14 RX ADMIN — LEVETIRACETAM 500 MG: 500 TABLET, FILM COATED ORAL at 22:30

## 2024-01-14 RX ADMIN — HYDRALAZINE HYDROCHLORIDE 50 MG: 50 TABLET ORAL at 22:30

## 2024-01-14 RX ADMIN — PREGABALIN 25 MG: 25 CAPSULE ORAL at 22:30

## 2024-01-14 RX ADMIN — RIFAMPIN 300 MG: 300 CAPSULE ORAL at 17:24

## 2024-01-14 RX ADMIN — MORPHINE SULFATE 2 MG: 2 INJECTION, SOLUTION INTRAMUSCULAR; INTRAVENOUS at 21:51

## 2024-01-14 RX ADMIN — ATORVASTATIN CALCIUM 40 MG: 40 TABLET, FILM COATED ORAL at 22:30

## 2024-01-14 RX ADMIN — METOPROLOL SUCCINATE 50 MG: 50 TABLET, EXTENDED RELEASE ORAL at 08:55

## 2024-01-14 RX ADMIN — CLONIDINE HYDROCHLORIDE 0.1 MG: 0.1 TABLET ORAL at 06:36

## 2024-01-14 ASSESSMENT — COGNITIVE AND FUNCTIONAL STATUS - GENERAL
DAILY ACTIVITIY SCORE: 24
MOBILITY SCORE: 24

## 2024-01-14 ASSESSMENT — PAIN - FUNCTIONAL ASSESSMENT
PAIN_FUNCTIONAL_ASSESSMENT: FLACC (FACE, LEGS, ACTIVITY, CRY, CONSOLABILITY)
PAIN_FUNCTIONAL_ASSESSMENT: 0-10
PAIN_FUNCTIONAL_ASSESSMENT: FLACC (FACE, LEGS, ACTIVITY, CRY, CONSOLABILITY)
PAIN_FUNCTIONAL_ASSESSMENT: 0-10
PAIN_FUNCTIONAL_ASSESSMENT: 0-10

## 2024-01-14 ASSESSMENT — PAIN SCALES - GENERAL
PAINLEVEL_OUTOF10: 9
PAINLEVEL_OUTOF10: 0 - NO PAIN
PAINLEVEL_OUTOF10: 8
PAINLEVEL_OUTOF10: 0 - NO PAIN
PAINLEVEL_OUTOF10: 8
PAINLEVEL_OUTOF10: 0 - NO PAIN
PAINLEVEL_OUTOF10: 9
PAINLEVEL_OUTOF10: 9
PAINLEVEL_OUTOF10: 0 - NO PAIN
PAINLEVEL_OUTOF10: 0 - NO PAIN
PAINLEVEL_OUTOF10: 9

## 2024-01-14 NOTE — PROGRESS NOTES
Batsheva Temple is a 48 y.o. female on day 4 of admission presenting with Hyperkalemia.      Subjective   Patient reports that she is very itchy with vancomycin even with the Benadryl.  Asking for stronger dose of Benadryl.  When she gets her vancomycin.  No complaints of pain.  No other concerns.           Objective     Last Recorded Vitals  BP (!) 129/41 (BP Location: Left leg, Patient Position: Lying)   Pulse 95   Temp 37 °C (98.6 °F) (Oral)   Resp 16   Wt 64.4 kg (141 lb 15.6 oz)   SpO2 100%   Intake/Output last 3 Shifts:    Intake/Output Summary (Last 24 hours) at 1/14/2024 1328  Last data filed at 1/13/2024 2024  Gross per 24 hour   Intake 1750 ml   Output 2800 ml   Net -1050 ml         Admission Weight  Weight: 65 kg (143 lb 4.8 oz) (01/08/24 0548)    Daily Weight  01/09/24 : 64.4 kg (141 lb 15.6 oz)    Image Results  Transesophageal Echo (LAURA)             [place] [address]             Phone [phone]    TRANSESOPHAGEAL ECHOCARDIOGRAM REPORT       Patient Name:     BATSHEVA TEMPLE Reading Physician:   [id number] Flores DO  Study Date:       1/12/2024            Ordering Provider:   Magdaleno FLORES  MRN/PID:          [id number]             Fellow:  Accession#:       [id number]         Nurse:  Date of           1975 / 48      Sonographer:         Dionte Vasquez RDCS  Birth/Age:        years  Gender:           F                    Additional Staff:  Height:           165.00 cm            Admit Date:  Weight:           65.00 kg             Admission Status:    Inpatient - Routine  BSA:              1.72 m2              Department Location: [facility]  Blood Pressure: 142 /50 mmHg    Study Type:    TRANSESOPHAGEAL ECHO (LAURA)  Diagnosis/ICD: Viral endocarditis-B33.21  Indication:    Endocarditis  CPT Codes:     LAURA Complete-38643    Patient History:  Pertinent History:  HTN, Hyperlipidemia, CAD and CHF. ESRD, Pacemaker, s/p                     Redo-sternotomy with TVr-ring repair, AVR #21 Inspirus, MVR                     #27 [employer] Epic Bioprosthesis.    Study Detail: The following Echo studies were performed: 2D, color flow and                Doppler.       PHYSICIAN INTERPRETATION:  LAURA Details: The LAURA probe used was [id number]. Technically adequate omniplane transesophageal echocardiogram performed. Color flow Doppler echo was performed to assess for the presence of a patent foramen ovale.  LAURA Medication: The patient was sedated by Anesthesia; please refer to anesthesia flow sheet for medications used.  LAURA Procedure: The probe was passed without difficulty.  Left Ventricle: Left ventricular systolic function is normal. There are no regional wall motion abnormalities. The left ventricular cavity size is normal. Left ventricular diastolic filling was not assessed.  Left Atrium: The left atrium is normal in size. There is a normal sized left atrial appendage, the left atrial appendage Doppler velocities are normal and there is no thrombus visualized in the left atrial appendage.  Right Ventricle: The right ventricle is normal in size. There is normal right ventricular global systolic function.  Right Atrium: The right atrium is normal in size.  Aortic Valve: There is no visualized aortic valve vegetation. There is a prosthetic aortic valve present. There is bioprosthetic aortic valve. There is no evidence of aortic valve regurgitation.  Mitral Valve: There is a prosthetic mitral valve present. There is a mitral valve bioprosthesis. There is trace mitral valve regurgitation. There is a mobile echodensity at the base of the anterior leaflet suspicious for vegetation, decreased in size in comparison to 9/2023 study. There is a small perforation of this leaflet associated but overall the valve integrity is fairly well preserved.  Tricuspid Valve: The tricuspid valve is structurally  normal. There is trace tricuspid regurgitation.  Pulmonic Valve: The pulmonic valve was not assessed. The pulmonic valve regurgitation was not assessed.  Pericardium: There is no pericardial effusion noted.  Aorta: The aortic root is normal.       CONCLUSIONS:   1. Left ventricular systolic function is normal.   2. There is a mobile echodensity at the base of the anterior leaflet suspicious for vegetation, decreased in size in comparison to 9/2023 study. There is a small perforation of this leaflet associated but overall the valve integrity is fairly well preserved.   3. No aortic valve vegetation visualized.   4. There is a bioprosthetic aortic valve.    QUANTITATIVE DATA SUMMARY:     [id number] Fabian Skaggs DO  Electronically signed on 1/12/2024 at 1:32:47 PM       ** Final **      Physical Exam  General: alert, no diaphoresis   Lungs: CTA BL   Heart: RRR, +murmur ,no LE edema BL   GI: abdomen soft, nontender, nondistended, BS present   MSK: no joint effusion or deformity   Skin: scabs noted on BL legs and pitting scars noted on UE and LE    Neuro: grossly normal cognition, motor strength, sensation    Relevant Results             Results for orders placed or performed during the hospital encounter of 01/08/24 (from the past 24 hour(s))   Blood Culture    Specimen: Dialysis; Blood culture   Result Value Ref Range    Blood Culture Loaded on Instrument - Culture in progress    Blood Culture    Specimen: Dialysis; Blood culture   Result Value Ref Range    Blood Culture Loaded on Instrument - Culture in progress      Scheduled medications  aspirin, 81 mg, oral, Daily  atorvastatin, 40 mg, oral, Nightly  buPROPion, 100 mg, oral, Every other day  calcitriol, 0.5 mcg, oral, Daily  cloNIDine, 0.1 mg, oral, q8h KIMBERLY  epoetin brandy or biosimilar, 100 Units/kg, subcutaneous, Once per day on Mon Wed Fri  heparin (porcine), 5,000 Units, subcutaneous, q8h KIMBERLY  heparin, 1,000 Units, intra-catheter, After Dialysis  heparin, 1,000 Units,  intra-catheter, After Dialysis  heparin, 1,000 Units, intra-catheter, After Dialysis  heparin, 1,000 Units, intra-catheter, After Dialysis  hydrALAZINE, 50 mg, oral, TID  levETIRAcetam, 500 mg, oral, Nightly  lidocaine, 0.1 mL, subcutaneous, Once  metoprolol succinate XL, 50 mg, oral, BID  pregabalin, 25 mg, oral, BID  rifAMPin, 300 mg, oral, q8h  vancomycin, 750 mg, intravenous, After Dialysis      Continuous medications  lactated Ringer's, 100 mL/hr  oxygen, , Last Rate: 15 L/min (01/12/24 0839)    PRN medications  PRN medications: acetaminophen, butamben-tetracaine-benzocaine, diphenhydrAMINE, diphenhydrAMINE, fentaNYL PF, iodixanol, labetaloL, lidocaine PF, morphine, oxyCODONE-acetaminophen, oxygen, promethazine, zolpidem      Assessment/Plan                  Principal Problem:    Hyperkalemia  Active Problems:    End-stage renal disease on hemodialysis (CMS/[facility])      MRSA bacteremia  - positive cultures 1/8 and 1/9. 1/11, 1/13 NGTD.   - Dr. Soto has added rifampin. On vancomycin IV and rifampin  - discussed with Dr. Skaggs; LAURA today showing improved vegetation size from prior LAURA; there was previously another vegetation on the ventricular side of the valve seen in September that is now gone. Small perforation of the valve associated with the vegetation with minimal regurgitation.  - patient's prior permacath was changed out 1/12 over guidewire, now with temporary catheter in that location.  -Can have her temporary dialysis line replaced with a new permacath once cultures are clear and okay with ID.  Some discussion of possibly transitioning patient to peritoneal dialysis so that she can get better lying clearance given her recurrent bacteremia and recurrent endocarditis    Hyperkalemia  ESRD  - now with temporary dialysis line  - Dr. Pedersen managing     H/o endocarditis-- see above  - with bioprosthetic valves, recently abx stopped. Normally follows with Dr. Soto.      HTN  - continue home meds-- add parameters  today     Depression  - continue buproprion     CAD  - statin, aspirin, beta blocker     Chronic pain  - continue patient's normal pain meds    Itching  - benadryl PRN.  Will give a stronger dose when she gets vancomycin     Acute on chronic anemia of chronic renal disease  - never received blood transfusion; her hemoglobin stabilized without transfusion. Consent in chart.    DVT ppx              Kinza Weiner DO

## 2024-01-14 NOTE — CARE PLAN
The patient's goals for the shift include none    The clinical goals for the shift include Adequate sleep, pain control    Over the shift, the patient did not make progress toward the following goals. Barriers to progression include . Recommendations to address these barriers include .\      Problem: ESRD: Dialysis, CAPD  Goal: Electrolytes restored to baseline  Flowsheets (Taken 1/14/2024 0650)  Electolytes restored to baseline:   Monitor drug/nephrotic toxicity   Monitor electrolyte/acid base imbalance   Monitor I&O, weight, dietary intake  Goal: Fatigue of chronic illness managed  Flowsheets (Taken 1/14/2024 0650)  Fatigue of chronic illness managed:   Assess site/manage complications   Encourage activity/cluster to conserve energy  Goal: Follows dialysis treatment plan  Flowsheets (Taken 1/14/2024 0650)  Follows dialysis treatment plan:   Assess site/manage complications   Monitor drug/nephrotic toxicity   Monitor for infection  Goal: Follows fluid restrictions  Flowsheets (Taken 1/14/2024 0650)  Follows fluid restrictions: Adjust/anticipate fluid restriction/replacement  Goal: Increase self care/family involvement  Flowsheets (Taken 1/14/2024 0650)  Increase self care/family involvement:   Encourage activity/cluster to conserve energy   Encourage self mgt/monitor comorbid conditions  Goal: Participates in bowel regimen (CAPD)  Flowsheets (Taken 1/14/2024 0650)  Participates in bowel regimen (CAPD): Monitor daily BM for CAPD patient  Goal: Reports no abdominal pain/distension  Flowsheets (Taken 1/14/2024 0650)  Reports no abdominal pain/distension:   Monitor drug/nephrotic toxicity   Monitor for infection  Goal: Reports no shortness of breath  Flowsheets (Taken 1/14/2024 0650)  Reports no shortness of breath: Assess presence of edema     Problem: Pain - Adult  Goal: Verbalizes/displays adequate comfort level or baseline comfort level  Flowsheets (Taken 1/14/2024 0650)  Verbalizes/displays adequate comfort level  or baseline comfort level:   Encourage patient to monitor pain and request assistance   Administer analgesics based on type and severity of pain and evaluate response   Assess pain using appropriate pain scale   Implement non-pharmacological measures as appropriate and evaluate response     Problem: Safety - Adult  Goal: Free from fall injury  Flowsheets (Taken 1/14/2024 0650)  Free from fall injury: Instruct family/caregiver on patient safety     Problem: Discharge Planning  Goal: Discharge to home or other facility with appropriate resources  Flowsheets (Taken 1/14/2024 0650)  Discharge to home or other facility with appropriate resources:   Identify barriers to discharge with patient and caregiver   Identify discharge learning needs (meds, wound care, etc)   Refer to discharge planning if patient needs post-hospital services based on physician order or complex needs related to functional status, cognitive ability or social support system   Arrange for needed discharge resources and transportation as appropriate   Arrange for interpreters to assist at discharge as needed     Problem: Chronic Conditions and Co-morbidities  Goal: Patient's chronic conditions and co-morbidity symptoms are monitored and maintained or improved  Flowsheets (Taken 1/14/2024 0650)  Care Plan - Patient's Chronic Conditions and Co-Morbidity Symptoms are Monitored and Maintained or Improved:   Monitor and assess patient's chronic conditions and comorbid symptoms for stability, deterioration, or improvement   Update acute care plan with appropriate goals if chronic or comorbid symptoms are exacerbated and prevent overall improvement and discharge   Collaborate with multidisciplinary team to address chronic and comorbid conditions and prevent exacerbation or deterioration

## 2024-01-14 NOTE — NURSING NOTE
Patient alert and oriented x 4. Patient is in bed, watching television. Patient assessed at this time. Patient has clear lung sounds upon auscultation. Patient is on room air. Patient is on TELE. Patients abdomen is soft, non distended, non tender with active bowel sounds. Patient has weakness to bilateral lower extremities. Patient does not appear to be in any distress. Fall precautions reviewed and implemented. Bed brakes locked, bed in lowest position, call light within reach, bed alarm activated. Will continue to monitor patient to ensure patient safety.    Tarsorrhaphy Text: A tarsorrhaphy was performed using Frost sutures.

## 2024-01-14 NOTE — PROGRESS NOTES
"Batsheva Page is a 48 y.o. female on day 4 of admission presenting with Hyperkalemia.    Subjective   Patient seen for end-stage kidney disease she is being treated for staph aureus bacteremia methicillin-resistant feels fine no complaints on IV antibiotics per recommendation of ID       Objective     Physical Exam  Neck:      Vascular: No carotid bruit.   Cardiovascular:      Rate and Rhythm: Normal rate and regular rhythm.      Heart sounds: No murmur heard.     No friction rub. No gallop.   Pulmonary:      Breath sounds: No wheezing, rhonchi or rales.   Chest:      Chest wall: No tenderness.   Abdominal:      General: There is no distension.      Tenderness: There is no abdominal tenderness. There is no guarding or rebound.   Musculoskeletal:         General: No swelling or tenderness.      Cervical back: Neck supple.      Right lower leg: No edema.      Left lower leg: No edema.   Lymphadenopathy:      Cervical: No cervical adenopathy.         Last Recorded Vitals  Blood pressure (!) 122/43, pulse 90, temperature 37.1 °C (98.8 °F), temperature source Oral, resp. rate 16, height 1.651 m (5' 5\"), weight 64.4 kg (141 lb 15.6 oz), SpO2 100 %.    Intake/Output last 3 Shifts:  I/O last 3 completed shifts:  In: 1972 (30.6 mL/kg) [P.O.:222; I.V.:1200 (18.6 mL/kg); Other:400; IV Piggyback:150]  Out: 2800 (43.5 mL/kg) [Other:2800]  Weight: 64.4 kg     Current Facility-Administered Medications:     acetaminophen (Tylenol) tablet 650 mg, 650 mg, oral, q6h PRN, Fabian Skaggs DO    aspirin EC tablet 81 mg, 81 mg, oral, Daily, Fabian Skaggs DO, 81 mg at 01/08/24 1142    atorvastatin (Lipitor) tablet 40 mg, 40 mg, oral, Nightly, Fabian Skaggs DO, 40 mg at 01/13/24 2253    buPROPion (Wellbutrin) tablet 100 mg, 100 mg, oral, Every other day, Fabian Skaggs DO    butamben-tetracaine-benzocaine (Cetacaine) spray, , , PRN, Fabian Skaggs DO, 1 spray at 01/12/24 0834    calcitriol (Rocaltrol) capsule 0.5 mcg, 0.5 mcg, " oral, Daily, Fabian Skaggs DO, 0.5 mcg at 01/10/24 0812    cloNIDine (Catapres) tablet 0.1 mg, 0.1 mg, oral, q8h KIMBERLY, Fabian Skaggs DO, 0.1 mg at 01/14/24 0636    diphenhydrAMINE (BENADryl) injection 25 mg, 25 mg, intravenous, q4h PRN, Fabian Skaggs DO, 25 mg at 01/14/24 0854    epoetin brandy-epbx (RETACRIT) injection 6,000 Units, 100 Units/kg, subcutaneous, Once per day on Mon Wed Fri, Fabian Skaggs DO, 6,000 Units at 01/10/24 1130    fentaNYL PF (Sublimaze) injection 50 mcg, 50 mcg, intravenous, q5 min PRN, Masoud Serrato MD, 50 mcg at 01/12/24 1728    heparin (porcine) injection 5,000 Units, 5,000 Units, subcutaneous, q8h [facility], Fabian Skaggs DO    heparin 1,000 unit/mL injection 1,000 Units, 1,000 Units, intra-catheter, After Dialysis, Fabian Skaggs DO, 2,100 Units at 01/11/24 1148    heparin 1,000 unit/mL injection 1,000 Units, 1,000 Units, intra-catheter, After Dialysis, Fabian Skaggs DO, 2,100 Units at 01/11/24 1147    heparin 1,000 unit/mL injection 1,000 Units, 1,000 Units, intra-catheter, After Dialysis, Zhou Pedersen MD    heparin 1,000 unit/mL injection 1,000 Units, 1,000 Units, intra-catheter, After Dialysis, Zhou Pedersen MD    hydrALAZINE (Apresoline) tablet 50 mg, 50 mg, oral, TID, Fabian Skaggs DO, 50 mg at 01/12/24 2120    iodixanol (VISIPaque) 320 mg iodine/mL injection, , , PRN, Chris Lott MD, 10 mL at 01/12/24 1659    labetaloL (Normodyne,Trandate) injection 5 mg, 5 mg, intravenous, Once PRN, Masoud Serrato MD    lactated Ringer's infusion, 100 mL/hr, intravenous, Continuous, Masoud Serrato MD    levETIRAcetam (Keppra) tablet 500 mg, 500 mg, oral, Nightly, Fabian Skaggs DO, 500 mg at 01/13/24 2253    lidocaine (Xylocaine) 10 mg/mL (1 %) injection 1 mg, 0.1 mL, subcutaneous, Once, Masoud Serrato MD    lidocaine PF (Xylocaine) 10 mg/mL (1 %) injection, , , PRN, Chris Lott MD, 5 mL at 01/12/24 1635    metoprolol succinate XL (Toprol-XL) 24 hr tablet 50  mg, 50 mg, oral, BID, Fabian Skaggs DO, 50 mg at 01/14/24 0855    morphine injection 2 mg, 2 mg, intravenous, q4h PRN, Fabian Skaggs DO, 2 mg at 01/14/24 0854    oxyCODONE-acetaminophen (Percocet) 5-325 mg per tablet 1 tablet, 1 tablet, oral, q6h PRN, Fabian Skaggs DO    oxygen (O2) therapy, , , Continuous PRN, Fabian Skaggs DO, Last Rate: 900,000 mL/hr at 01/12/24 0839, 15 L/min at 01/12/24 0839    pregabalin (Lyrica) capsule 25 mg, 25 mg, oral, BID, Fabian Skaggs DO, 25 mg at 01/14/24 0636    promethazine (Phenergan) 12.5 mg in sodium chloride 0.9% 50 mL IV, 12.5 mg, intravenous, q6h PRN, Fabian Skaggs DO, 12.5 mg at 01/08/24 2152    rifAMPin (Rifadin) capsule 300 mg, 300 mg, oral, q8h, Fabian Skaggs DO, 300 mg at 01/14/24 0857    vancomycin-diluent combo no.1 (Xellia) IVPB 750 mg, 750 mg, intravenous, After Dialysis, Kinza Weiner DO, Stopped at 01/13/24 2109    zolpidem (Ambien) tablet 5 mg, 5 mg, oral, Nightly PRN, Fabian Skaggs DO, 5 mg at 01/11/24 2305   Relevant Results    Results for orders placed or performed during the hospital encounter of 01/08/24 (from the past 96 hour(s))   Prepare RBC: 1 Units   Result Value Ref Range    PRODUCT CODE U9016M50     Unit Number [id number]     Unit ABO A     Unit RH NEG     XM INTEP COMP     Dispense Status RE     Blood Expiration Date January 24, 2024 23:59 EST     PRODUCT BLOOD TYPE 0600     UNIT VOLUME 350    Type and screen   Result Value Ref Range    ABO TYPE A     Rh TYPE NEG     ANTIBODY SCREEN NEG    Transthoracic Echo (TTE) Complete   Result Value Ref Range    AV pk peggy 2.88     LVOT diam 2.00     AV mn grad 19.0     MV E/A ratio 1.85     LV biplane EF 63     RVSP 35.3     LVIDd 3.87     AV pk grad 33.2     Aortic Valve Area by Continuity of VTI 1.06     Aortic Valve Area by Continuity of Peak Velocity 1.22     LV A4C EF 60.9    Basic Metabolic Panel   Result Value Ref Range    Glucose 88 65 - 99 mg/dL    Sodium 135 133 - 145 mmol/L     Potassium 5.4 (H) 3.4 - 5.1 mmol/L    Chloride 93 (L) 97 - 107 mmol/L    Bicarbonate 20 (L) 24 - 31 mmol/L    Urea Nitrogen 36 (H) 8 - 25 mg/dL    Creatinine 6.40 (H) 0.40 - 1.60 mg/dL    eGFR 7 (L) >60 mL/min/1.73m*2    Calcium 8.5 8.5 - 10.4 mg/dL    Anion Gap >19 (H) <=19 mmol/L   CBC   Result Value Ref Range    WBC 7.2 4.4 - 11.3 x10*3/uL    nRBC 0.0 0.0 - 0.0 /100 WBCs    RBC 3.60 (L) 4.00 - 5.20 x10*6/uL    Hemoglobin 8.3 (L) 12.0 - 16.0 g/dL    Hematocrit 28.1 (L) 36.0 - 46.0 %    MCV 78 (L) 80 - 100 fL    MCH 23.1 (L) 26.0 - 34.0 pg    MCHC 29.5 (L) 32.0 - 36.0 g/dL    RDW 17.2 (H) 11.5 - 14.5 %    Platelets 200 150 - 450 x10*3/uL   Vancomycin   Result Value Ref Range    Vancomycin 40.4 (H) 10.0 - 20.0 ug/mL   Vancomycin   Result Value Ref Range    Vancomycin 26.1 (H) 10.0 - 20.0 ug/mL   Blood Culture    Specimen: Peripheral Venipuncture; Blood culture   Result Value Ref Range    Blood Culture No growth at 1 day    Blood Culture    Specimen: Peripheral Venipuncture; Blood culture   Result Value Ref Range    Blood Culture No growth at 1 day    Basic Metabolic Panel   Result Value Ref Range    Glucose 97 65 - 99 mg/dL    Sodium 139 133 - 145 mmol/L    Potassium 4.3 3.4 - 5.1 mmol/L    Chloride 96 (L) 97 - 107 mmol/L    Bicarbonate 25 24 - 31 mmol/L    Urea Nitrogen 22 8 - 25 mg/dL    Creatinine 4.30 (H) 0.40 - 1.60 mg/dL    eGFR 12 (L) >60 mL/min/1.73m*2    Calcium 8.4 (L) 8.5 - 10.4 mg/dL    Anion Gap 18 <=19 mmol/L   CBC   Result Value Ref Range    WBC 5.7 4.4 - 11.3 x10*3/uL    nRBC 0.0 0.0 - 0.0 /100 WBCs    RBC 3.56 (L) 4.00 - 5.20 x10*6/uL    Hemoglobin 8.1 (L) 12.0 - 16.0 g/dL    Hematocrit 28.1 (L) 36.0 - 46.0 %    MCV 79 (L) 80 - 100 fL    MCH 22.8 (L) 26.0 - 34.0 pg    MCHC 28.8 (L) 32.0 - 36.0 g/dL    RDW 17.1 (H) 11.5 - 14.5 %    Platelets 205 150 - 450 x10*3/uL   Basic Metabolic Panel   Result Value Ref Range    Glucose 90 65 - 99 mg/dL    Sodium 138 133 - 145 mmol/L    Potassium 4.3 3.4 - 5.1  mmol/L    Chloride 96 (L) 97 - 107 mmol/L    Bicarbonate 24 24 - 31 mmol/L    Urea Nitrogen 30 (H) 8 - 25 mg/dL    Creatinine 6.40 (H) 0.40 - 1.60 mg/dL    eGFR 7 (L) >60 mL/min/1.73m*2    Calcium 7.9 (L) 8.5 - 10.4 mg/dL    Anion Gap 18 <=19 mmol/L   CBC   Result Value Ref Range    WBC 6.9 4.4 - 11.3 x10*3/uL    nRBC 0.0 0.0 - 0.0 /100 WBCs    RBC 3.38 (L) 4.00 - 5.20 x10*6/uL    Hemoglobin 7.7 (L) 12.0 - 16.0 g/dL    Hematocrit 27.3 (L) 36.0 - 46.0 %    MCV 81 80 - 100 fL    MCH 22.8 (L) 26.0 - 34.0 pg    MCHC 28.2 (L) 32.0 - 36.0 g/dL    RDW 17.2 (H) 11.5 - 14.5 %    Platelets 204 150 - 450 x10*3/uL   Blood Culture    Specimen: Dialysis; Blood culture   Result Value Ref Range    Blood Culture Loaded on Instrument - Culture in progress    Blood Culture    Specimen: Dialysis; Blood culture   Result Value Ref Range    Blood Culture Loaded on Instrument - Culture in progress      Impression and plan;    End-stage kidney disease    MRSA bacteremia secondary to line infection  Hyperkalemia  Malfunctioning dialysis catheter  Hypertension  Anemia of chronic kidney disease  History of recurrent endocarditis    Plan:  Schedule dialysis tomorrow morning, continue antibiotics per infectious ID awaiting clearance to place a permacat                   Zhou Pedersen MD

## 2024-01-14 NOTE — NURSING NOTE
Noted patient's Dialysis catheter dressing fell off.  Sterile dressing done.  Patient well tolerated.   Kept patient comfortable on bed.  Safety measures ensured and call light within reach.  Plan of care ongoing.

## 2024-01-14 NOTE — PROGRESS NOTES
INFECTIOUS DISEASES PROGRESS NOTE    Consulted / following patient for:  Recurrent Staph aureus septicemia    Subjective   Interval History:   (Sleeping soundly, not awakened)       Objective   PHYSICAL EXAMINATION  Vital signs:  Visit Vitals  /57 (BP Location: Left leg)   Pulse 91   Temp 37 °C (98.6 °F) (Oral)   Resp 17      General: Sleep, not toxic, no acute distress  Chest: Tunneled dialysis catheter was replaced over a wire on 1/12.  Site unremarkable.  Lungs clear  Heart:  S1, S2 normal, no change in systolic murmur  Skin and mucosa: No peripheral signs of infectious endocarditis      Relevant Results  WBC: 6900   Post-dialysis vancomycin level 1/11: 26.7  Results from last 72 hours   Lab Units 01/13/24  0443   CREATININE mg/dL 6.40*   ANION GAP mmol/L 18   EGFR mL/min/1.73m*2 7*           Microbiology:  Blood (1/8): MRSA X2  Blood (1/9): MRSA X2  Blood (1/11, peripheral): Negative X2  Blood (1/13, dialysis): Pending X2  Dialysis catheter tip (1/12): I requested that this be submitted for culture but it appears that was not done    Imaging:  TTE: No vegetations  LAURA: There is a mobile echodensity at the base of the anterior leaflet suspicious for vegetation, decreased in size in comparison to 9/2023 study. There is a small perforation of this leaflet associated but overall the valve integrity is fairly well preserved.  No aortic valve vegetation visualized.      ASSESSMENT:  MRSA bacteremia  Patient has recurrent MRSA septicemia which either represents a relapse of her prosthetic valve endocarditis, recurrent infection of her dialysis catheter, or both.  There is no evidence for heart failure or peripheral embolic disease and she is hemodynamically stable.  Blood cultures elected peripherally on 1/11 remain sterile thus far.  Dialysis catheter was changed over a wire on 1/12, and thus far blood cultures collected on dialysis 1/13 are sterile.  We need to determine whether we have achieved sterility of  her bloodstream     Prosthetic mitral valve endocarditis, September 2023  See above.  Hemodynamically stable    Chronic renal failure  Patient is running out of viable options for placement of new dialysis catheters, and once we are able to demonstrate bloodstream sterility, would like to consider transition to peritoneal dialysis        PLANS:  -   Vancomycin 750 mg after every dialysis  -   Continue rifampin 300 mg p.o. every 8 hours  -   Wait further incubation of peripheral blood cultures drawn 1/11 and 1/13  -   Await further consideration of the option of peritoneal dialysis     Adama Soto MD  [facility]  Office:  [phone]

## 2024-01-14 NOTE — PROGRESS NOTES
"Batsheva Page is a 48 y.o. female on day 4 of admission presenting with Hyperkalemia.    Subjective   Alert and oriented x 3, denies complaints chest pain or pressure, palpitations or feeling rapid heart rate.        Objective     Physical Exam  Vitals and nursing note reviewed.   Constitutional:       General: She is not in acute distress.     Appearance: She is ill-appearing. She is not toxic-appearing.   HENT:      Head: Normocephalic and atraumatic.      Nose: Nose normal.      Mouth/Throat:      Mouth: Mucous membranes are moist.   Cardiovascular:      Rate and Rhythm: Normal rate and regular rhythm.      Pulses: Normal pulses.      Heart sounds: Normal heart sounds. No murmur heard.     No friction rub. No gallop.   Pulmonary:      Effort: Pulmonary effort is normal.      Breath sounds: Normal breath sounds.   Abdominal:      General: Abdomen is flat. Bowel sounds are normal.      Palpations: Abdomen is soft.   Musculoskeletal:      Cervical back: Normal range of motion.      Right lower leg: No edema.      Left lower leg: No edema.   Skin:     Capillary Refill: Capillary refill takes less than 2 seconds.   Neurological:      Mental Status: She is alert and oriented to person, place, and time.   Psychiatric:         Mood and Affect: Mood normal.         Behavior: Behavior normal.         Thought Content: Thought content normal.         Judgment: Judgment normal.         Last Recorded Vitals  Blood pressure (!) 122/43, pulse 90, temperature 37.1 °C (98.8 °F), temperature source Oral, resp. rate 16, height 1.651 m (5' 5\"), weight 64.4 kg (141 lb 15.6 oz), SpO2 100 %.  Intake/Output last 3 Shifts:  I/O last 3 completed shifts:  In: 1972 (30.6 mL/kg) [P.O.:222; I.V.:1200 (18.6 mL/kg); Other:400; IV Piggyback:150]  Out: 2800 (43.5 mL/kg) [Other:2800]  Weight: 64.4 kg     Relevant Results  Results for orders placed or performed during the hospital encounter of 01/08/24 (from the past 24 hour(s))   Blood " Culture    Specimen: Dialysis; Blood culture   Result Value Ref Range    Blood Culture Loaded on Instrument - Culture in progress    Blood Culture    Specimen: Dialysis; Blood culture   Result Value Ref Range    Blood Culture Loaded on Instrument - Culture in progress          Assessment/Plan   Principal Problem:    Hyperkalemia  Active Problems:    End-stage renal disease on hemodialysis (CMS/HCC)    Hx of Endocarditis: with bioprosthetic valve replacements  Staph Aureus Bacteremia  Hyperkalemia  End Stage Renal Disease on Dialysis  Hypertensive Disorder  Depression  Coronary Artery Disease     Impression and Plan:      1/10: Presented the emergency department with concerns for displacement of her dialysis permacath.  Patient reports some pain at the site of the catheter but denies any other symptoms.  Chest x-ray in the emergency department shows no acute cardiopulmonary processes.  Her EKG was a normal sinus rhythm with PVCs and no acute T wave or ST changes.  Blood cultures were drawn in the emergency department which were positive x 2 for gram-positive cocci.  Patient was started on vancomycin.  Patient does have a history of MRSA septicemia and was planned to be started on maintenance antibiotics however patient stopped taking her rifampin several weeks ago.  At the time of my exam patient is alert and oriented.  She is breathing comfortably on room air with pulse oximetry of 100%.  She sinus rhythm on telemetry without significant ectopy.  Blood pressures are low normal at 104/41.  Labs morning shows sodium 134, potassium 5.0, creatinine 4.60.  White blood cells are normal at 6.1.  Patient appears euvolemic on examination.  We were consulted to see the patient with concern for endocarditis and need for repeat LAURA.  Patient did have a recent LAURA in September 2023 when she presented to the hospital with similar symptoms.  This LAURA showed a normal ejection fraction of 55 to 60%.  Vegetation was noted on the  mitral valve.  No aortic valve vegetation was visualized at that time. I will discuss the possibility of LAURA this admission further with Dr. Skaggs. I have also ordered a transthoracic echocardiogram to be completed today. At this time patient is refusing repeat valve replacements. Will continue to follow.      1/11: No significant changes over past 24 hours.  Patient was assessed at dialysis this morning.  He is chest pain-free and euvolemic on examination.  Breathing comfortably on room air.  Did undergo transthoracic echo yesterday, will be interpreted by cardiology team this morning.  Her blood pressure has remained stable with most recent 113/31.  Creatinine has risen to 6.4 and she is currently undergoing dialysis now.  Remains  anemic but stable with current hemoglobin of 8.3.  She continues on IV vancomycin as directed by infectious disease.  Dr. Skaggs will discuss with infectious disease and the necessity of LAURA.  Will follow with you.        1/13: Stable overnight.  Denies complaints chest pain or pressure, palpitations or feeling rapid heart rate.  Patient did undergo LAURA yesterday which did reveal improvement in previously known vegetation on mitral valve.  Some vegetation still persist.  Overall valve function is generally well-preserved. She is euvolemic on examination.  Will go for dialysis today.  Mildly  tachycardic this morning.  Does have some palpitations however she remained in a sinus rhythm without significant ectopy otherwise.  Pressure is stable with most recent of 117/40.  Patient is anemic which is likely contributing to her tachycardia.  Overall generally stable from a cardiac perspective.  Continues to receive IV antibiotics as directed by infectious disease    1/14: No significant change over the past 24 hours.  Chest pain-free.  Euvolemic on examination.  Breathing comfortably room air.  she did have a mild sinus tachycardia yesterday which resolved after dialysis.  Blood pressure  remained stable with most recent of 122/43.  Room air pulse ox 100%.  Patient continues on IV antibiotics as directed by infectious disease.  No further recommendations at this time from a cardiac perspective.  Will sign off.  Patient to follow-up with Dr. Skaggs in the outpatient setting per current outpatient schedule.  Please feel free to reach out for further or new concerns.       I spent 35 minutes in the professional and overall care of this patient.      Arsen Barclay, APRN-CNP

## 2024-01-14 NOTE — CARE PLAN
Problem: Pain  Goal: Takes deep breaths with improved pain control throughout the shift  Outcome: Progressing  Goal: Turns in bed with improved pain control throughout the shift  Outcome: Progressing  Goal: Walks with improved pain control throughout the shift  Outcome: Progressing  Goal: Performs ADL's with improved pain control throughout shift  Outcome: Progressing  Goal: Participates in PT with improved pain control throughout the shift  Outcome: Progressing  Goal: Free from opioid side effects throughout the shift  Outcome: Progressing  Goal: Free from acute confusion related to pain meds throughout the shift  Outcome: Progressing     Problem: ESRD: Dialysis, CAPD  Goal: Electrolytes restored to baseline  Outcome: Progressing  Goal: Fatigue of chronic illness managed  Outcome: Progressing  Goal: Follows dialysis treatment plan  Outcome: Progressing  Goal: Follows fluid restrictions  Outcome: Progressing  Goal: Increase self care/family involvement  Outcome: Progressing  Goal: Participates in bowel regimen (CAPD)  Outcome: Progressing  Goal: Reports no abdominal pain/distension  Outcome: Progressing  Goal: Reports no shortness of breath  Outcome: Progressing     Problem: Pain - Adult  Goal: Verbalizes/displays adequate comfort level or baseline comfort level  Outcome: Progressing     Problem: Safety - Adult  Goal: Free from fall injury  Outcome: Progressing     Problem: Discharge Planning  Goal: Discharge to home or other facility with appropriate resources  Outcome: Progressing     Problem: Chronic Conditions and Co-morbidities  Goal: Patient's chronic conditions and co-morbidity symptoms are monitored and maintained or improved  Outcome: Progressing   The patient's goals for the shift include none    The clinical goals for the shift include Adequate sleep, pain control

## 2024-01-15 ENCOUNTER — APPOINTMENT (OUTPATIENT)
Dept: DIALYSIS | Facility: HOSPITAL | Age: 49
End: 2024-01-15
Payer: MEDICARE

## 2024-01-15 ENCOUNTER — PHARMACY VISIT (OUTPATIENT)
Dept: PHARMACY | Facility: CLINIC | Age: 49
End: 2024-01-15
Payer: COMMERCIAL

## 2024-01-15 VITALS
DIASTOLIC BLOOD PRESSURE: 75 MMHG | HEIGHT: 65 IN | BODY MASS INDEX: 23.65 KG/M2 | HEART RATE: 100 BPM | TEMPERATURE: 98.8 F | WEIGHT: 141.98 LBS | SYSTOLIC BLOOD PRESSURE: 117 MMHG | OXYGEN SATURATION: 95 % | RESPIRATION RATE: 18 BRPM

## 2024-01-15 PROBLEM — B95.62 MRSA BACTEREMIA: Status: ACTIVE | Noted: 2024-01-15

## 2024-01-15 PROBLEM — R78.81 MRSA BACTEREMIA: Status: ACTIVE | Noted: 2024-01-15

## 2024-01-15 PROCEDURE — RXMED WILLOW AMBULATORY MEDICATION CHARGE

## 2024-01-15 PROCEDURE — 97165 OT EVAL LOW COMPLEX 30 MIN: CPT | Mod: GO

## 2024-01-15 PROCEDURE — 2500000004 HC RX 250 GENERAL PHARMACY W/ HCPCS (ALT 636 FOR OP/ED): Performed by: INTERNAL MEDICINE

## 2024-01-15 PROCEDURE — 2500000001 HC RX 250 WO HCPCS SELF ADMINISTERED DRUGS (ALT 637 FOR MEDICARE OP): Performed by: INTERNAL MEDICINE

## 2024-01-15 PROCEDURE — 97161 PT EVAL LOW COMPLEX 20 MIN: CPT | Mod: GP

## 2024-01-15 PROCEDURE — 6350000001 HC RX 635 EPOETIN >10,000 UNITS: Mod: JW | Performed by: HOSPITALIST

## 2024-01-15 PROCEDURE — 2500000001 HC RX 250 WO HCPCS SELF ADMINISTERED DRUGS (ALT 637 FOR MEDICARE OP): Performed by: HOSPITALIST

## 2024-01-15 PROCEDURE — 2500000004 HC RX 250 GENERAL PHARMACY W/ HCPCS (ALT 636 FOR OP/ED): Performed by: HOSPITALIST

## 2024-01-15 PROCEDURE — 8010000001 HC DIALYSIS - HEMODIALYSIS PER DAY

## 2024-01-15 RX ORDER — HEPARIN SODIUM 1000 [USP'U]/ML
1000 INJECTION, SOLUTION INTRAVENOUS; SUBCUTANEOUS
Status: COMPLETED | OUTPATIENT
Start: 2024-01-15 | End: 2024-01-15

## 2024-01-15 RX ORDER — ACETAMINOPHEN 325 MG/1
650 TABLET ORAL EVERY 6 HOURS PRN
Qty: 30 TABLET | Refills: 0 | Status: SHIPPED | OUTPATIENT
Start: 2024-01-15

## 2024-01-15 RX ORDER — ASPIRIN 81 MG/1
81 TABLET ORAL DAILY
Qty: 30 TABLET | Refills: 2 | Status: SHIPPED | OUTPATIENT
Start: 2024-01-15

## 2024-01-15 RX ORDER — CLONIDINE HYDROCHLORIDE 0.1 MG/1
0.1 TABLET ORAL EVERY 8 HOURS SCHEDULED
Qty: 90 TABLET | Refills: 3 | Status: SHIPPED | OUTPATIENT
Start: 2024-01-15

## 2024-01-15 RX ORDER — BUPROPION HYDROCHLORIDE 100 MG/1
100 TABLET ORAL EVERY OTHER DAY
Qty: 30 TABLET | Refills: 3 | Status: SHIPPED | OUTPATIENT
Start: 2024-01-15 | End: 2024-05-07 | Stop reason: HOSPADM

## 2024-01-15 RX ORDER — RIFAMPIN 300 MG/1
300 CAPSULE ORAL EVERY 8 HOURS
Qty: 90 CAPSULE | Refills: 1 | Status: SHIPPED | OUTPATIENT
Start: 2024-01-15 | End: 2024-03-15

## 2024-01-15 RX ORDER — OXYCODONE AND ACETAMINOPHEN 5; 325 MG/1; MG/1
1 TABLET ORAL EVERY 6 HOURS PRN
Qty: 5 TABLET | Refills: 0 | Status: SHIPPED | OUTPATIENT
Start: 2024-01-15

## 2024-01-15 RX ADMIN — VANCOMYCIN 750 MG: 750 INJECTION, SOLUTION INTRAVENOUS at 13:31

## 2024-01-15 RX ADMIN — CLONIDINE HYDROCHLORIDE 0.1 MG: 0.1 TABLET ORAL at 06:09

## 2024-01-15 RX ADMIN — PREGABALIN 25 MG: 25 CAPSULE ORAL at 06:09

## 2024-01-15 RX ADMIN — DIPHENHYDRAMINE HYDROCHLORIDE 25 MG: 50 INJECTION, SOLUTION INTRAMUSCULAR; INTRAVENOUS at 13:31

## 2024-01-15 RX ADMIN — HEPARIN SODIUM 2100 UNITS: 1000 INJECTION INTRAVENOUS; SUBCUTANEOUS at 11:58

## 2024-01-15 RX ADMIN — HEPARIN SODIUM 2100 UNITS: 1000 INJECTION INTRAVENOUS; SUBCUTANEOUS at 11:59

## 2024-01-15 RX ADMIN — MORPHINE SULFATE 2 MG: 2 INJECTION, SOLUTION INTRAMUSCULAR; INTRAVENOUS at 05:13

## 2024-01-15 RX ADMIN — CLONIDINE HYDROCHLORIDE 0.1 MG: 0.1 TABLET ORAL at 13:31

## 2024-01-15 RX ADMIN — MORPHINE SULFATE 2 MG: 2 INJECTION, SOLUTION INTRAMUSCULAR; INTRAVENOUS at 13:31

## 2024-01-15 RX ADMIN — MORPHINE SULFATE 2 MG: 2 INJECTION, SOLUTION INTRAMUSCULAR; INTRAVENOUS at 09:29

## 2024-01-15 RX ADMIN — DIPHENHYDRAMINE HYDROCHLORIDE 25 MG: 50 INJECTION, SOLUTION INTRAMUSCULAR; INTRAVENOUS at 05:12

## 2024-01-15 RX ADMIN — EPOETIN ALFA-EPBX 6000 UNITS: 20000 INJECTION, SOLUTION INTRAVENOUS; SUBCUTANEOUS at 12:14

## 2024-01-15 RX ADMIN — DIPHENHYDRAMINE HYDROCHLORIDE 50 MG: 50 INJECTION, SOLUTION INTRAMUSCULAR; INTRAVENOUS at 09:28

## 2024-01-15 ASSESSMENT — PAIN SCALES - GENERAL
PAINLEVEL_OUTOF10: 6
PAINLEVEL_OUTOF10: 8
PAINLEVEL_OUTOF10: 4
PAINLEVEL_OUTOF10: 8
PAINLEVEL_OUTOF10: 8
PAINLEVEL_OUTOF10: 10 - WORST POSSIBLE PAIN
PAINLEVEL_OUTOF10: 4

## 2024-01-15 ASSESSMENT — COGNITIVE AND FUNCTIONAL STATUS - GENERAL
MOBILITY SCORE: 24
DAILY ACTIVITIY SCORE: 24
MOBILITY SCORE: 24
DAILY ACTIVITIY SCORE: 24
MOBILITY SCORE: 24
DAILY ACTIVITIY SCORE: 24

## 2024-01-15 ASSESSMENT — PAIN DESCRIPTION - DESCRIPTORS
DESCRIPTORS: SORE
DESCRIPTORS: ACHING;SORE

## 2024-01-15 ASSESSMENT — PAIN DESCRIPTION - LOCATION: LOCATION: GENERALIZED

## 2024-01-15 ASSESSMENT — ACTIVITIES OF DAILY LIVING (ADL)
ADLS_ADDRESSED: NO
ADL_ASSISTANCE: INDEPENDENT
ADL_ASSISTANCE: INDEPENDENT
BATHING_ASSISTANCE: INDEPENDENT

## 2024-01-15 ASSESSMENT — PAIN - FUNCTIONAL ASSESSMENT
PAIN_FUNCTIONAL_ASSESSMENT: 0-10
PAIN_FUNCTIONAL_ASSESSMENT: FLACC (FACE, LEGS, ACTIVITY, CRY, CONSOLABILITY)
PAIN_FUNCTIONAL_ASSESSMENT: 0-10

## 2024-01-15 NOTE — NURSING NOTE
Patient discharged home via wheelchair. Escorted patient out. VSS at time of discharge. IV out with tip intact. All belongings accounted for, discharge papers in hand, and medications brought up from pharmacy.

## 2024-01-15 NOTE — PROGRESS NOTES
INFECTIOUS DISEASES PROGRESS NOTE    Consulted / following patient for:  Recurrent Staph aureus septicemia    Subjective   Interval History:   Feels better  Tolerating rifampin  Hoping to be discharged soon    Objective   PHYSICAL EXAMINATION  Vital signs:  Visit Vitals  BP (!) 145/35 (BP Location: Left leg, Patient Position: Lying)   Pulse 89   Temp 35.9 °C (96.6 °F) (Temporal)   Resp 18      General: Awake and alert, not toxic, no distress  Chest: Tunneled dialysis catheter was replaced over a wire on 1/12.  Site unremarkable.  Lungs clear  Heart:  S1, S2 normal, no change in systolic murmur  Skin and mucosa: No peripheral signs of infectious endocarditis    Microbiology:  Blood (1/8): MRSA X2  Blood (1/9): MRSA X2  Blood (1/11, peripheral): Negative X2  Blood (1/13, dialysis): Negative X2  Dialysis catheter tip (1/12): I requested that this be submitted for culture but it appears that was not done    Imaging:  TTE: No vegetations  LAURA: There is a mobile echodensity at the base of the anterior leaflet suspicious for vegetation, decreased in size in comparison to 9/2023 study. There is a small perforation of this leaflet associated but overall the valve integrity is fairly well preserved.  No aortic valve vegetation visualized.      ASSESSMENT:  MRSA bacteremia  Patient has recurrent MRSA septicemia which either represents a relapse of her prosthetic valve endocarditis, recurrent infection of her dialysis catheter, or both.  There is no evidence for heart failure or peripheral embolic disease and she is hemodynamically stable.  Blood cultures elected peripherally on 1/11 remain sterile thus far.  Dialysis catheter was changed over a wire on 1/12, and blood cultures collected on dialysis 1/13 are sterile.       Prosthetic mitral valve endocarditis, September 2023  See above.  Hemodynamically stable, no embolic disease or significant valve dysfunction    Chronic renal failure  Patient is running out of viable options  "for placement of new dialysis catheters.  It appears that we have again achieved bloodstream sterility and we have a tunneled catheter in place.  Dr. LIBIA Pedersen and I are in agreement that the best option moving forward would be to transition to peritoneal dialysis, and the patient is generally agreeable and says that she has to \"get used to the idea.\"  I have spoken in detail with Dr. Pedersen regarding the plan and timing       PLANS:  -   Vancomycin 750 mg after every dialysis through 2/21/2024 (approximately 6 weeks since the last positive blood culture)  -   Continue rifampin 300 mg p.o. every 8 hours through 2/21/2024 (I will send this prescription to her community pharmacy)  -   Have asked Dr. Pedersen to arrange for a pre-dialysis vancomycin level on 1/19 to be faxed to me at [phone], to assure adequacy of vancomycin regimen  -   Hopefully, placement of a peritoneal dialysis catheter and arrangement for initiation of peritoneal dialysis can occur during the following 6 weeks while she is receiving intravenous vancomycin and oral rifampin    Dissipate discharge soon, follow-up with me 2/21/2024 at 10 AM     Adama Soto MD  [facility]  Office:  [phone]  "

## 2024-01-15 NOTE — NURSING NOTE
Report to Receiving RN:    Report To: Farheen  Time Report Called: [id number]  Hand-Off Communication: Patient cut Tx be 30 minutes and Dr. Nuvia jimenez  Removed 1.2 liters of fluid  Complications During Treatment: No  Ultrafiltration Treatment: No  Medications Administered During Dialysis: No  Blood Products Administered During Dialysis: No  Labs Sent During Dialysis: No  Heparin Drip Rate Changes: No    Electronic Signatures:  Deya Marley RN (Signed )   Authored:    (Signed )   Authored:     Last Updated: 12:52 PM by DEYA MARLEY

## 2024-01-15 NOTE — NURSING NOTE
Assumed care of pt, bedside nurse shift report received, pt alert and oriented,intact skin, watching TV in bed, no complain of discomfort, call light within reach.

## 2024-01-15 NOTE — PROGRESS NOTES
"Batsheva Page is a 48 y.o. female on day 5 of admission presenting with MRSA bacteremia.    Subjective   Seen and examined undergoing hemodialysis doing well blood cultures are reported as negative but waiting for final culture report today anticipated discharge tomorrow with ongoing post hemodialysis antibiotics per infectious disease have communicated with both nephrology and infectious disease for antibiotic regime at discharge       Objective     Physical Exam  Vitals and nursing note reviewed.   Constitutional:       Appearance: Normal appearance.   HENT:      Head: Normocephalic and atraumatic.      Nose: Nose normal.      Mouth/Throat:      Mouth: Mucous membranes are moist.   Eyes:      Extraocular Movements: Extraocular movements intact.      Pupils: Pupils are equal, round, and reactive to light.   Cardiovascular:      Rate and Rhythm: Normal rate and regular rhythm.      Pulses: Normal pulses.      Heart sounds: Normal heart sounds.   Pulmonary:      Effort: Pulmonary effort is normal.      Breath sounds: Normal breath sounds.   Abdominal:      Palpations: Abdomen is soft.   Musculoskeletal:         General: Normal range of motion.      Cervical back: Normal range of motion and neck supple.   Skin:     General: Skin is warm and dry.      Capillary Refill: Capillary refill takes less than 2 seconds.   Neurological:      General: No focal deficit present.      Mental Status: She is alert and oriented to person, place, and time. Mental status is at baseline.   Psychiatric:         Mood and Affect: Mood normal.         Last Recorded Vitals  Blood pressure (!) 145/35, pulse 78, temperature 36.3 °C (97.3 °F), temperature source Temporal, resp. rate 18, height 1.651 m (5' 5\"), weight 64.4 kg (141 lb 15.6 oz), SpO2 100 %.  Intake/Output last 3 Shifts:  I/O last 3 completed shifts:  In: 390 (6.1 mL/kg) [P.O.:240; IV Piggyback:150]  Out: - (0 mL/kg)   Weight: 64.4 kg     Relevant Results         No results " found for this or any previous visit (from the past 24 hour(s)).  Susceptibility data from last 90 days.  Collected Specimen Info Organism Clindamycin Erythromycin Oxacillin Tetracycline Trimethoprim/Sulfamethoxazole Vancomycin   01/09/24 Blood culture from Peripheral Venipuncture Staphylococcus aureus         01/09/24 Blood culture from Peripheral Venipuncture Methicillin Resistant Staphylococcus aureus (MRSA) S S R S S S   01/08/24 Blood culture from Peripheral Venipuncture Staphylococcus aureus         01/08/24 Blood culture from Peripheral Venipuncture Methicillin Resistant Staphylococcus aureus (MRSA) S S R S S S                         Assessment/Plan   Principal Problem:    MRSA bacteremia  Active Problems:    End-stage renal disease on hemodialysis (CMS/[facility])    Hyperkalemia    Discharge planning for outpatient IV antibiotics following hemodialysis discharge plan for tomorrow       I spent 30 minutes in the professional and overall care of this patient.      Washington Perdue DO

## 2024-01-15 NOTE — PROGRESS NOTES
MSW was informed pt is ready for dc and will need IV abx at dialysis. MSW spoke with Dr. Pedersen who stated he will arrange for that. Pt can dc home safely. MSW updated RN.    Safe dc plan secured.    Latricia CAMPOSA, LSW

## 2024-01-15 NOTE — PROGRESS NOTES
Dc plan remains home no needs. TCC to follow if pt will need IV abx.     Latricia Noriega MSSA, LSW

## 2024-01-15 NOTE — PROGRESS NOTES
"Batsheva Page is a 48 y.o. female on day 5 of admission presenting with Hyperkalemia.    Subjective   Patient seen and examined on dialysis therapy she feels fine she has no fever chills feels well no other symptoms rating procedure well and her dialysis catheter is working fairly well       Objective     Physical Exam  Neck:      Vascular: No carotid bruit.   Cardiovascular:      Rate and Rhythm: Normal rate and regular rhythm.      Heart sounds: No murmur heard.     No friction rub. No gallop.   Pulmonary:      Breath sounds: No wheezing, rhonchi or rales.   Chest:      Chest wall: No tenderness.   Abdominal:      General: There is no distension.      Tenderness: There is no abdominal tenderness. There is no guarding or rebound.   Musculoskeletal:         General: No swelling or tenderness.      Cervical back: Neck supple.      Right lower leg: No edema.      Left lower leg: No edema.   Lymphadenopathy:      Cervical: No cervical adenopathy.         Last Recorded Vitals  Blood pressure (!) 145/35, pulse 78, temperature 36.3 °C (97.3 °F), temperature source Temporal, resp. rate 18, height 1.651 m (5' 5\"), weight 64.4 kg (141 lb 15.6 oz), SpO2 100 %.    Intake/Output last 3 Shifts:  I/O last 3 completed shifts:  In: 390 (6.1 mL/kg) [P.O.:240; IV Piggyback:150]  Out: - (0 mL/kg)   Weight: 64.4 kg     Current Facility-Administered Medications:     acetaminophen (Tylenol) tablet 650 mg, 650 mg, oral, q6h PRN, Fabian Skaggs DO    aspirin EC tablet 81 mg, 81 mg, oral, Daily, Fabian Skaggs DO, 81 mg at 01/08/24 1142    atorvastatin (Lipitor) tablet 40 mg, 40 mg, oral, Nightly, Fabian Skaggs DO, 40 mg at 01/14/24 2230    buPROPion (Wellbutrin) tablet 100 mg, 100 mg, oral, Every other day, Fabian Skaggs DO    butamben-tetracaine-benzocaine (Cetacaine) spray, , , PRN, Fabian Skaggs DO, 1 spray at 01/12/24 0834    calcitriol (Rocaltrol) capsule 0.5 mcg, 0.5 mcg, oral, Daily, Fabian Skaggs DO, 0.5 mcg at " 01/10/24 0812    cloNIDine (Catapres) tablet 0.1 mg, 0.1 mg, oral, q8h [facility], Fabian Skaggs DO, 0.1 mg at 01/15/24 0609    diphenhydrAMINE (BENADryl) injection 25 mg, 25 mg, intravenous, q4h PRN, Fabian Skaggs DO, 25 mg at 01/15/24 0512    diphenhydrAMINE (BENADryl) injection 50 mg, 50 mg, intravenous, Daily PRN, Kinza Weiner DO, 50 mg at 01/15/24 0928    epoetin brandy-epbx (RETACRIT) injection 6,000 Units, 100 Units/kg, subcutaneous, Once per day on Mon Wed Fri, Fabian Skaggs DO, 6,000 Units at 01/10/24 1130    fentaNYL PF (Sublimaze) injection 50 mcg, 50 mcg, intravenous, q5 min PRN, Masoud Serrato MD, 50 mcg at 01/12/24 1728    heparin (porcine) injection 5,000 Units, 5,000 Units, subcutaneous, q8h [facility], Fabian Skaggs DO    heparin 1,000 unit/mL injection 1,000 Units, 1,000 Units, intra-catheter, After Dialysis, Fabian Skaggs DO, 2,100 Units at 01/11/24 1148    heparin 1,000 unit/mL injection 1,000 Units, 1,000 Units, intra-catheter, After Dialysis, Fabian Skaggs DO, 2,100 Units at 01/11/24 1147    heparin 1,000 unit/mL injection 1,000 Units, 1,000 Units, intra-catheter, After Dialysis, Zhou Pedersen MD    heparin 1,000 unit/mL injection 1,000 Units, 1,000 Units, intra-catheter, After Dialysis, Zhou Pedersen MD    hydrALAZINE (Apresoline) tablet 50 mg, 50 mg, oral, TID, Fabian Skaggs DO, 50 mg at 01/14/24 2230    iodixanol (VISIPaque) 320 mg iodine/mL injection, , , PRN, Chris Lott MD, 10 mL at 01/12/24 1659    labetaloL (Normodyne,Trandate) injection 5 mg, 5 mg, intravenous, Once PRN, Masoud Serrato MD    lactated Ringer's infusion, 100 mL/hr, intravenous, Continuous, Masoud Serrato MD    levETIRAcetam (Keppra) tablet 500 mg, 500 mg, oral, Nightly, Fabian Skaggs DO, 500 mg at 01/14/24 2230    lidocaine (Xylocaine) 10 mg/mL (1 %) injection 1 mg, 0.1 mL, subcutaneous, Once, Masoud Serrato MD    lidocaine PF (Xylocaine) 10 mg/mL (1 %) injection, , , PRN, Chris Lott,  MD, 5 mL at 01/12/24 1635    metoprolol succinate XL (Toprol-XL) 24 hr tablet 50 mg, 50 mg, oral, BID, Fabian Skaggs DO, 50 mg at 01/14/24 2230    morphine injection 2 mg, 2 mg, intravenous, q4h PRN, Fabian Skaggs DO, 2 mg at 01/15/24 0929    oxyCODONE-acetaminophen (Percocet) 5-325 mg per tablet 1 tablet, 1 tablet, oral, q6h PRN, Fabian Skaggs DO    oxygen (O2) therapy, , , Continuous PRN, Fabian Skaggs DO, Last Rate: 900,000 mL/hr at 01/12/24 0839, 15 L/min at 01/12/24 0839    pregabalin (Lyrica) capsule 25 mg, 25 mg, oral, BID, Fabian Skaggs DO, 25 mg at 01/15/24 0609    promethazine (Phenergan) 12.5 mg in sodium chloride 0.9% 50 mL IV, 12.5 mg, intravenous, q6h PRN, Fabian Skaggs DO, 12.5 mg at 01/08/24 2152    rifAMPin (Rifadin) capsule 300 mg, 300 mg, oral, q8h, Fabian Skaggs DO, 300 mg at 01/14/24 1724    vancomycin-diluent combo no.1 (Xellia) IVPB 750 mg, 750 mg, intravenous, After Dialysis, Kinza Weiner DO, Stopped at 01/13/24 2109    zolpidem (Ambien) tablet 5 mg, 5 mg, oral, Nightly PRN, Fabian Skaggs DO, 5 mg at 01/11/24 2305   Relevant Results    Results for orders placed or performed during the hospital encounter of 01/08/24 (from the past 96 hour(s))   Vancomycin   Result Value Ref Range    Vancomycin 26.1 (H) 10.0 - 20.0 ug/mL   Blood Culture    Specimen: Peripheral Venipuncture; Blood culture   Result Value Ref Range    Blood Culture No growth at 2 days    Blood Culture    Specimen: Peripheral Venipuncture; Blood culture   Result Value Ref Range    Blood Culture No growth at 2 days    Basic Metabolic Panel   Result Value Ref Range    Glucose 97 65 - 99 mg/dL    Sodium 139 133 - 145 mmol/L    Potassium 4.3 3.4 - 5.1 mmol/L    Chloride 96 (L) 97 - 107 mmol/L    Bicarbonate 25 24 - 31 mmol/L    Urea Nitrogen 22 8 - 25 mg/dL    Creatinine 4.30 (H) 0.40 - 1.60 mg/dL    eGFR 12 (L) >60 mL/min/1.73m*2    Calcium 8.4 (L) 8.5 - 10.4 mg/dL    Anion Gap 18 <=19 mmol/L   CBC   Result  Value Ref Range    WBC 5.7 4.4 - 11.3 x10*3/uL    nRBC 0.0 0.0 - 0.0 /100 WBCs    RBC 3.56 (L) 4.00 - 5.20 x10*6/uL    Hemoglobin 8.1 (L) 12.0 - 16.0 g/dL    Hematocrit 28.1 (L) 36.0 - 46.0 %    MCV 79 (L) 80 - 100 fL    MCH 22.8 (L) 26.0 - 34.0 pg    MCHC 28.8 (L) 32.0 - 36.0 g/dL    RDW 17.1 (H) 11.5 - 14.5 %    Platelets 205 150 - 450 x10*3/uL   Basic Metabolic Panel   Result Value Ref Range    Glucose 90 65 - 99 mg/dL    Sodium 138 133 - 145 mmol/L    Potassium 4.3 3.4 - 5.1 mmol/L    Chloride 96 (L) 97 - 107 mmol/L    Bicarbonate 24 24 - 31 mmol/L    Urea Nitrogen 30 (H) 8 - 25 mg/dL    Creatinine 6.40 (H) 0.40 - 1.60 mg/dL    eGFR 7 (L) >60 mL/min/1.73m*2    Calcium 7.9 (L) 8.5 - 10.4 mg/dL    Anion Gap 18 <=19 mmol/L   CBC   Result Value Ref Range    WBC 6.9 4.4 - 11.3 x10*3/uL    nRBC 0.0 0.0 - 0.0 /100 WBCs    RBC 3.38 (L) 4.00 - 5.20 x10*6/uL    Hemoglobin 7.7 (L) 12.0 - 16.0 g/dL    Hematocrit 27.3 (L) 36.0 - 46.0 %    MCV 81 80 - 100 fL    MCH 22.8 (L) 26.0 - 34.0 pg    MCHC 28.2 (L) 32.0 - 36.0 g/dL    RDW 17.2 (H) 11.5 - 14.5 %    Platelets 204 150 - 450 x10*3/uL   Blood Culture    Specimen: Dialysis; Blood culture   Result Value Ref Range    Blood Culture No growth at 1 day    Blood Culture    Specimen: Dialysis; Blood culture   Result Value Ref Range    Blood Culture No growth at 1 day      Impression and plan;    End-stage kidney disease   MRSA bacteremia secondary to line infection  Hyperkalemia which is resolved  Malfunctioning dialysis catheter  Hypertension  Anemia of chronic kidney disease  History of recurrent endocarditis    Plan:    We will continue antibiotic therapy per infectious disease, far her blood cultures are negative if the blood cultures are still negative by tomorrow possible discharge to receive IV antibiotics as an outpatient will pursue peritoneal dialysis options with the patient                 Zhou Pedersen MD

## 2024-01-15 NOTE — PROGRESS NOTES
Occupational Therapy    Evaluation    Patient Name: Batsheva Page  MRN: [id number]  Today's Date: 1/15/2024  Time Calculation  Start Time: 1340  Stop Time: 1355  Time Calculation (min): 15 min      Assessment:  OT Assessment: OT order received, chart reviewed, evaluation completed, pt demonstrsted independence in ADLs no acute OT needs identified.  Medical Staff Made Aware: Yes  End of Session Communication: Bedside nurse  End of Session Patient Position: Bed, 2 rail up, Alarm off, not on at start of session (needs in reach)  Medical Staff Made Aware: Yes  Plan:  No Skilled OT: Independent with ADLs  OT Frequency: OT eval only  OT Discharge Recommendations: No further acute OT  OT - OK to Discharge: Yes       Subjective     General:  General  Reason for Referral: activities of daily living  Referred By: Nette MENDEZ  Past Medical History Relevant to Rehab: ESRD on dialysis, HTN, Mitral valve replacement x2, seizure, aortic valve replacement, parathyroidectomy, CABG  Co-Treatment: PT  Co-Treatment Reason: optimize patient outcomes  Prior to Session Communication: Bedside nurse  Patient Position Received: Bed, 2 rail up, Alarm off, not on at start of session  Preferred Learning Style: verbal  Precautions:  Hearing/Visual Limitations: wears glasses  Medical Precautions: Fall precautions    Pain:  Pain Assessment  Pain Assessment: 0-10  Pain Score: 4  Pain Type: Chronic pain  Pain Location: Leg    Objective   Cognition:  Overall Cognitive Status: Within Functional Limits     Home Living:  Type of Home: House  Lives With: Alone  Home Adaptive Equipment: None  Home Layout: Two level, Able to live on main level with bedroom/bathroom  Home Access: Stairs to enter without rails  Entrance Stairs-Rails: None  Entrance Stairs-Number of Steps: 5  Home Living Comments: able to stay on first floor  Prior Function:  Level of [place]: Independent with ADLs and functional transfers  ADL Assistance: Independent  Homemaking  Assistance: Independent  Ambulatory Assistance: Independent    ADL:  Eating Assistance: Independent  Grooming Assistance: Independent  Bathing Assistance: Independent  UE Dressing Assistance: Independent  LE Dressing Assistance: Independent  LE Dressing Deficit: Don/doff R shoe, Don/doff L shoe  Toileting Assistance with Device: Not performed (pt declined)  Activity Tolerance:  Endurance: Decreased tolerance for upright activites  Bed Mobility/Transfers: Bed Mobility  Bed Mobility: Yes  Bed Mobility 1  Bed Mobility 1: Supine to sitting  Level of Assistance 1: Modified independent  Bed Mobility Comments 1: HOB elevated and use of bed rail  Bed Mobility 2  Bed Mobility  2: Sitting to supine  Level of Assistance 2: Modified independent  Bed Mobility Comments 2: returned to supine needs in reach    Transfers  Transfer: Yes  Transfer 1  Transfer From 1: Sit to  Transfer to 1: Stand  Technique 1: Sit to stand  Transfer Level of Assistance 1: Independent  Trials/Comments 1: Pt stood from bed independently and performed functional mobility extended household distance, mild balance deficits noted, pt reports this is baseline and has chronic leg issues  Transfers 2  Transfer From 2: Stand to  Transfer to 2: Sit  Technique 2: Stand to sit  Transfer Level of Assistance 2: Independent     Strength:  Strength Comments: grossly 4/5 B UEs observed functionally    Coordination:  Movements are Fluid and Coordinated: Yes (for upper body)   Hand Function:  Gross Grasp: Functional  Coordination: Impaired  Extremities: RUE   RUE : Within Functional Limits    Outcome Measures:[facility] Daily Activity  Putting on and taking off regular lower body clothing: None  Bathing (including washing, rinsing, drying): None  Putting on and taking off regular upper body clothing: None  Toileting, which includes using toilet, bedpan or urinal: None  Taking care of personal grooming such as brushing teeth: None  Eating Meals: None  Daily Activity - Total  Score: 24    Education Documentation  ADL Training, taught by Darcie Williamson OT at 1/15/2024  2:27 PM.  Learner: Patient  Readiness: Acceptance  Method: Explanation  Response: Verbalizes Understanding  Comment: edu on safety precautions and no OT needs    Education Comments  No comments found.

## 2024-01-15 NOTE — NURSING NOTE
Report received. Patient resting in bed. No distress noted. Denies needs. Call light within reach.

## 2024-01-15 NOTE — NURSING NOTE
Report from Sending RN:    Report From: Farheen Amaya  Recent Surgery of Procedure: No  Baseline Level of Consciousness (LOC): A&O X's 4  Oxygen Use: No  Type: room air  Diabetic: No  Last BP Med Given Day of Dialysis: see EMAR  Last Pain Med Given: see EMAR  Lab Tests to be Obtained with Dialysis: No  Blood Transfusion to be Given During Dialysis: No  Available IV Access: Yes  Medications to be Administered During Dialysis: No  Continuous IV Infusion Running: No  Restraints on Currently or in the Last 24 Hours: No  Hand-Off Communication: Uneventful night.  Ready for treatment.

## 2024-01-15 NOTE — NURSING NOTE
Patient back from dialysis. Patient requesting vancomycin to be given when she can have her benadryl and pain medication. Patient was given 50mg IV benadryl in dialysis. She does have 25mg IV q4hr PRN that she can have as well but she would like it with her pain medication. Retimed vanc.

## 2024-01-15 NOTE — CARE PLAN
Problem: Pain  Goal: Takes deep breaths with improved pain control throughout the shift  Outcome: Met  Goal: Turns in bed with improved pain control throughout the shift  Outcome: Met  Goal: Walks with improved pain control throughout the shift  Outcome: Met  Goal: Performs ADL's with improved pain control throughout shift  Outcome: Met  Goal: Participates in PT with improved pain control throughout the shift  Outcome: Met  Goal: Free from opioid side effects throughout the shift  Outcome: Met  Goal: Free from acute confusion related to pain meds throughout the shift  Outcome: Met     Problem: ESRD: Dialysis, CAPD  Goal: Electrolytes restored to baseline  Outcome: Met  Goal: Fatigue of chronic illness managed  Outcome: Met  Goal: Follows dialysis treatment plan  Outcome: Met  Goal: Follows fluid restrictions  Outcome: Met  Goal: Increase self care/family involvement  Outcome: Met  Goal: Participates in bowel regimen (CAPD)  Outcome: Met  Goal: Reports no abdominal pain/distension  Outcome: Met  Goal: Reports no shortness of breath  Outcome: Met     Problem: Pain - Adult  Goal: Verbalizes/displays adequate comfort level or baseline comfort level  Outcome: Met     Problem: Safety - Adult  Goal: Free from fall injury  Outcome: Met     Problem: Discharge Planning  Goal: Discharge to home or other facility with appropriate resources  Outcome: Met     Problem: Chronic Conditions and Co-morbidities  Goal: Patient's chronic conditions and co-morbidity symptoms are monitored and maintained or improved  Outcome: Met       Patient to be discharged today.

## 2024-01-15 NOTE — DISCHARGE SUMMARY
Discharge Diagnosis  MRSA bacteremia    Issues Requiring Follow-Up  Outpatient antibiotics vancomycin Monday Wednesday Friday at dialysis    Discharge Meds     Your medication list        START taking these medications        Instructions Last Dose Given Next Dose Due   acetaminophen 325 mg tablet  Commonly known as: Tylenol      Take 2 tablets (650 mg) by mouth every 6 hours if needed for mild pain (1 - 3).       epoetin brandy-epbx 20,000 unit/mL solution  Commonly known as: RETACRIT  Start taking on: January 17, 2024      Inject 6,440 Units under the skin 3 times a week. Do not start before January 17, 2024.       oxyCODONE-acetaminophen 5-325 mg tablet  Commonly known as: Percocet      Take 1 tablet by mouth every 6 hours if needed for moderate pain (4 - 6).       rifAMPin 300 mg capsule  Commonly known as: Rifadin      Take 1 capsule (300 mg) by mouth every 8 hours.              CHANGE how you take these medications        Instructions Last Dose Given Next Dose Due   cloNIDine 0.1 mg tablet  Commonly known as: Catapres  What changed:   medication strength  when to take this  additional instructions      Take 1 tablet (0.1 mg) by mouth every 8 hours.              CONTINUE taking these medications        Instructions Last Dose Given Next Dose Due   aspirin 81 mg EC tablet      Take 1 tablet (81 mg) by mouth once daily.       atorvastatin 40 mg tablet  Commonly known as: Lipitor           buPROPion 100 mg tablet  Commonly known as: Wellbutrin      Take 1 tablet (100 mg) by mouth every other day.       calcitriol 0.25 mcg capsule  Commonly known as: Rocaltrol           ergocalciferol 1.25 MG (45375 UT) capsule  Commonly known as: Vitamin D-2           hydrALAZINE 50 mg tablet  Commonly known as: Apresoline           levETIRAcetam 500 mg tablet  Commonly known as: Keppra           Lokelma 10 gram packet  Generic drug: sodium zirconium cyclosilicate      DISSOLVE 1 PACKET INTO 45ML OF WATER TAKE DAILY BEFORE DINNER.  ADMINSTER OTHER MEDICTIONS AT LEAST 2 HOURS BEFORE OR 2 HOURS AFTER LOKELMA       metoprolol succinate XL 50 mg 24 hr tablet  Commonly known as: Toprol-XL      TAKE 1 TABLET BY MOUTH TWICE DAILY       pregabalin 25 mg capsule  Commonly known as: Lyrica           promethazine 25 mg tablet  Commonly known as: Phenergan                  STOP taking these medications      Lunesta 1 mg tablet  Generic drug: eszopiclone                  Where to Get Your Medications        These medications were sent to [facility]  [id number] [address] [address]      Hours: 9 AM to 6 PM Mon-Fri, 9 AM to 1 PM Sat Phone: [phone]   acetaminophen 325 mg tablet  aspirin 81 mg EC tablet  buPROPion 100 mg tablet  cloNIDine 0.1 mg tablet  epoetin brandy-epbx 20,000 unit/mL solution  oxyCODONE-acetaminophen 5-325 mg tablet       These medications [id number] - [place], [place] - [address] AT [address] [address] [address] [address]      Phone: [phone]   rifAMPin 300 mg capsule         Test Results Pending At Discharge  Pending Labs       Order Current Status    Blood Culture Preliminary result    Blood Culture Preliminary result    Blood Culture Preliminary result    Blood Culture Preliminary result            Hospital Course   48-year-old  female with end-stage renal disease receiving hemodialysis presents with lethargy fatigue found to have MRSA bacteremia endocarditis evaluation was negative her dialysis catheter was subsequently removed new tunneled catheter was placed after sterilization of her blood with combination of IV vancomycin and oral rifampin infectious disease was consulted she is to continue on oral rifampin through the 21st with ongoing IV vancomycin following hemodialysis Monday Wednesday and Friday to complete a total of 6 weeks of therapy outpatient follow-up with infectious disease and nephrology with plans to eventually  transition to possible peritoneal dialysis.  Patient was evaluated by physical and Occupational Therapy she is medically stable for discharge with her antibiotics as previously detailed    Pertinent Physical Exam At Time of Discharge  Physical Exam  Vitals and nursing note reviewed.   Constitutional:       Appearance: Normal appearance.   HENT:      Head: Normocephalic and atraumatic.      Mouth/Throat:      Mouth: Mucous membranes are moist.   Eyes:      Extraocular Movements: Extraocular movements intact.      Pupils: Pupils are equal, round, and reactive to light.   Cardiovascular:      Rate and Rhythm: Normal rate and regular rhythm.      Pulses: Normal pulses.      Heart sounds: Normal heart sounds.   Pulmonary:      Effort: Pulmonary effort is normal.      Breath sounds: Normal breath sounds.   Abdominal:      General: Abdomen is flat.      Palpations: Abdomen is soft.   Musculoskeletal:         General: Normal range of motion.      Cervical back: Normal range of motion and neck supple.   Skin:     General: Skin is warm and dry.      Capillary Refill: Capillary refill takes less than 2 seconds.   Neurological:      General: No focal deficit present.      Mental Status: She is alert and oriented to person, place, and time. Mental status is at baseline.   Psychiatric:         Mood and Affect: Mood normal.         Outpatient Follow-Up  No future appointments.      Washington Perdue DO

## 2024-01-15 NOTE — PROGRESS NOTES
Physical Therapy    Physical Therapy Evaluation    Patient Name: Batsheva Page  MRN: [id number]  Today's Date: 1/15/2024   Time Calculation  Start Time: 1341  Stop Time: 1355  Time Calculation (min): 14 min    Assessment/Plan   PT Assessment  PT Assessment Results: Decreased endurance  Rehab Prognosis: Good  Barriers to Discharge: none  Evaluation/Treatment Tolerance: Patient tolerated treatment well  Medical Staff Made Aware: Yes  Strengths: Ability to acquire knowledge, Premorbid level of function  Barriers to Participation: Comorbidities  End of Session Communication: Bedside nurse  Assessment Comment: pt is functioning independently and does not demonstrate any further need for acute skilled physicla therapy services. Will discontinue PT order at this time.  End of Session Patient Position: Bed, 2 rail up, Alarm off, not on at start of session (needs in reach)  IP OR SWING BED PT PLAN  Inpatient or Swing Bed: Inpatient  PT Plan  PT Plan: PT Eval only  PT Eval Only Reason: No acute PT needs identified  PT Frequency: PT eval only  PT Discharge Recommendations: No further acute PT  PT Recommended Transfer Status: Independent  PT - OK to Discharge: Yes    Subjective   General Visit Information:  General  Reason for Referral: Impaired Mobility  Referred By: Washington Perdue DO  Past Medical History Relevant to Rehab: ESRD on dialysis, HTN, Mitral valve replacement x2, seizure, aortic valve replacement, parathyroidectomy, CABG  Family/Caregiver Present: No  Co-Treatment: OT  Co-Treatment Reason: optimize patient outcomes  Prior to Session Communication: Bedside nurse  Patient Position Received: Bed, 2 rail up, Alarm off, not on at start of session  Preferred Learning Style: verbal  Home Living:  Home Living  Type of Home: House  Lives With: Alone  Home Adaptive Equipment: None  Home Layout: Two level, Able to live on main level with bedroom/bathroom  Home Access: Stairs to enter without rails  Entrance  Stairs-Rails: None  Entrance Stairs-Number of Steps: 5  Home Living Comments: able to stay on first floo  Prior Level of Function:  Prior Function Per Pt/Caregiver Report  Level of [place]: Independent with ADLs and functional transfers  ADL Assistance: Independent  Homemaking Assistance: Independent  Ambulatory Assistance: Independent  Prior Function Comments: denies falls  Precautions:  Precautions  Hearing/Visual Limitations: wears glasses  Medical Precautions: Fall precautions    Objective   Pain:  Pain Assessment  Pain Assessment: FLACC (Face, Legs, Activity, Cry, Consolability)  Pain Score: 4  Pain Type: Chronic pain  Pain Location: Generalized  Cognition:  Cognition  Overall Cognitive Status: Within Functional Limits    General Assessments:  General Observation  General Observation: visible skin intact, dialysis access site not visualized.    Activity Tolerance  Endurance: Decreased tolerance for upright activites  Activity Tolerance Comments: fair    Sensation  Sensation Comment: denies paresthesias    Strength  Strength Comments: B LEs equal in strength, 4/5  Coordination  Coordination Comment: grossly intact, slightly increased time to complete activities, pt did have dialysis earlier    Postural Control  Posture Comment: WFL    Static Sitting Balance  Static Sitting-Balance Support: Feet supported  Static Sitting-Level of Assistance: Independent    Static Standing Balance  Static Standing-Balance Support: No upper extremity supported  Static Standing-Level of Assistance: Independent  Functional Assessments:  ADL  ADL's Addressed: No    Bed Mobility  Bed Mobility:  (supine<->sit with HOB elevated modified independent, no difficulty with transfer.)    Transfers  Transfer:  (sit<>stand independent, steady, no LOB.)    Ambulation/Gait Training  Ambulation/Gait Training Performed:  (25'x2, no AD, wide GIULIANA with decreased knee flexion bilaterally with ambulation.  Slightly antalgic gait noted with increased  single leg stance time on R LE.)    Stairs  Stairs: No  Extremity/Trunk Assessments:  RLE   RLE : Within Functional Limits  LLE   LLE : Within Functional Limits  Outcome Measures:  [facility] Basic Mobility  Turning from your back to your side while in a flat bed without using bedrails: None  Moving from lying on your back to sitting on the side of a flat bed without using bedrails: None  Moving to and from bed to chair (including a wheelchair): None  Standing up from a chair using your arms (e.g. wheelchair or bedside chair): None  To walk in hospital room: None  Climbing 3-5 steps with railing: None  Basic Mobility - Total Score: 24    Education Documentation  Mobility Training, taught by Katharine Medrano PT at 1/15/2024  2:01 PM.  Learner: Patient  Readiness: Acceptance  Method: Explanation  Response: Verbalizes Understanding    Education Comments  No comments found.

## 2024-01-16 LAB
BACTERIA BLD CULT: NORMAL
BACTERIA BLD CULT: NORMAL

## 2024-01-18 LAB
BACTERIA BLD CULT: NORMAL
BACTERIA BLD CULT: NORMAL

## 2024-02-08 ENCOUNTER — HOSPITAL ENCOUNTER (EMERGENCY)
Facility: HOSPITAL | Age: 49
Discharge: HOME | End: 2024-02-09
Payer: MEDICARE

## 2024-02-08 DIAGNOSIS — N18.6 END STAGE RENAL DISEASE (MULTI): Primary | ICD-10-CM

## 2024-02-08 DIAGNOSIS — D64.9 ANEMIA, UNSPECIFIED TYPE: ICD-10-CM

## 2024-02-08 LAB
BASOPHILS # BLD AUTO: 0.05 X10*3/UL (ref 0–0.1)
BASOPHILS NFR BLD AUTO: 0.6 %
EOSINOPHIL # BLD AUTO: 0.5 X10*3/UL (ref 0–0.7)
EOSINOPHIL NFR BLD AUTO: 6.5 %
ERYTHROCYTE [DISTWIDTH] IN BLOOD BY AUTOMATED COUNT: 17.9 % (ref 11.5–14.5)
HCT VFR BLD AUTO: 26.3 % (ref 36–46)
HGB BLD-MCNC: 7.6 G/DL (ref 12–16)
IMM GRANULOCYTES # BLD AUTO: 0.02 X10*3/UL (ref 0–0.7)
IMM GRANULOCYTES NFR BLD AUTO: 0.3 % (ref 0–0.9)
LYMPHOCYTES # BLD AUTO: 1.46 X10*3/UL (ref 1.2–4.8)
LYMPHOCYTES NFR BLD AUTO: 18.8 %
MCH RBC QN AUTO: 22.8 PG (ref 26–34)
MCHC RBC AUTO-ENTMCNC: 28.9 G/DL (ref 32–36)
MCV RBC AUTO: 79 FL (ref 80–100)
MONOCYTES # BLD AUTO: 0.6 X10*3/UL (ref 0.1–1)
MONOCYTES NFR BLD AUTO: 7.7 %
NEUTROPHILS # BLD AUTO: 5.12 X10*3/UL (ref 1.2–7.7)
NEUTROPHILS NFR BLD AUTO: 66.1 %
NRBC BLD-RTO: 0 /100 WBCS (ref 0–0)
PLATELET # BLD AUTO: 241 X10*3/UL (ref 150–450)
RBC # BLD AUTO: 3.33 X10*6/UL (ref 4–5.2)
WBC # BLD AUTO: 7.8 X10*3/UL (ref 4.4–11.3)

## 2024-02-08 PROCEDURE — 80048 BASIC METABOLIC PNL TOTAL CA: CPT

## 2024-02-08 PROCEDURE — 99283 EMERGENCY DEPT VISIT LOW MDM: CPT

## 2024-02-08 PROCEDURE — 86901 BLOOD TYPING SEROLOGIC RH(D): CPT

## 2024-02-08 PROCEDURE — 36415 COLL VENOUS BLD VENIPUNCTURE: CPT

## 2024-02-08 PROCEDURE — 85025 COMPLETE CBC W/AUTO DIFF WBC: CPT

## 2024-02-08 ASSESSMENT — COLUMBIA-SUICIDE SEVERITY RATING SCALE - C-SSRS
6. HAVE YOU EVER DONE ANYTHING, STARTED TO DO ANYTHING, OR PREPARED TO DO ANYTHING TO END YOUR LIFE?: NO
1. IN THE PAST MONTH, HAVE YOU WISHED YOU WERE DEAD OR WISHED YOU COULD GO TO SLEEP AND NOT WAKE UP?: NO
2. HAVE YOU ACTUALLY HAD ANY THOUGHTS OF KILLING YOURSELF?: NO

## 2024-02-08 ASSESSMENT — LIFESTYLE VARIABLES
EVER FELT BAD OR GUILTY ABOUT YOUR DRINKING: NO
HAVE PEOPLE ANNOYED YOU BY CRITICIZING YOUR DRINKING: NO
HAVE YOU EVER FELT YOU SHOULD CUT DOWN ON YOUR DRINKING: NO
EVER HAD A DRINK FIRST THING IN THE MORNING TO STEADY YOUR NERVES TO GET RID OF A HANGOVER: NO

## 2024-02-08 ASSESSMENT — PAIN SCALES - GENERAL: PAINLEVEL_OUTOF10: 8

## 2024-02-08 ASSESSMENT — PAIN - FUNCTIONAL ASSESSMENT: PAIN_FUNCTIONAL_ASSESSMENT: 0-10

## 2024-02-08 ASSESSMENT — PAIN DESCRIPTION - LOCATION: LOCATION: LEG

## 2024-02-09 VITALS
WEIGHT: 136.69 LBS | OXYGEN SATURATION: 99 % | DIASTOLIC BLOOD PRESSURE: 69 MMHG | HEART RATE: 87 BPM | RESPIRATION RATE: 17 BRPM | BODY MASS INDEX: 20.72 KG/M2 | TEMPERATURE: 99.3 F | HEIGHT: 68 IN | SYSTOLIC BLOOD PRESSURE: 138 MMHG

## 2024-02-09 LAB
ABO GROUP (TYPE) IN BLOOD: NORMAL
ANION GAP SERPL CALC-SCNC: >19 MMOL/L
ANTIBODY SCREEN: NORMAL
BUN SERPL-MCNC: 47 MG/DL (ref 8–25)
CALCIUM SERPL-MCNC: 7.8 MG/DL (ref 8.5–10.4)
CHLORIDE SERPL-SCNC: 88 MMOL/L (ref 97–107)
CO2 SERPL-SCNC: 26 MMOL/L (ref 24–31)
CREAT SERPL-MCNC: 8.2 MG/DL (ref 0.4–1.6)
EGFRCR SERPLBLD CKD-EPI 2021: 6 ML/MIN/1.73M*2
GLUCOSE SERPL-MCNC: 101 MG/DL (ref 65–99)
POTASSIUM SERPL-SCNC: 5.3 MMOL/L (ref 3.4–5.1)
RH FACTOR (ANTIGEN D): NORMAL
SODIUM SERPL-SCNC: 134 MMOL/L (ref 133–145)

## 2024-02-09 ASSESSMENT — PAIN SCALES - GENERAL: PAINLEVEL_OUTOF10: 8

## 2024-02-09 NOTE — ED PROVIDER NOTES
HPI   Chief Complaint   Patient presents with    Abnormal Lab     Pt reports low blood counts per Dr. Pedersen       Patient is a 48-year-old female with a history of end-stage renal disease on hemodialysis presenting to the emergency department for evaluation of abnormal labs.  Patient states she had lab work done and was called by Dr. Pedersen's nurse practitioner and advised to come to the emergency department due to a decrease in her hemoglobin.  Patient states she has been feeling tired over the past week as well as generalized weakness and pain in her legs bilaterally.  She denies any other symptoms.  She denies chest pain, shortness of breath, fever, chills, nausea, vomiting, abdominal pain, recent travel, recent illness, cough, congestion, headaches, numbness, tingling, constipation, diarrhea.                          [place] Coma Scale Score: 15                     Patient History   Past Medical History:   Diagnosis Date    Aortic valve replaced 2022    CHF (congestive heart failure) (CMS/[facility]ly [facility])     Chronic pain     Coronary artery disease     Disease of thyroid gland     ESRD (end stage renal disease) (CMS/[facility]ly [facility])     H/O mitral valve replacement 2013    and 2022    Heart disease     History of transfusion     Hypertension     Mitral valve regurgitation     Seizures (CMS/[facility])     Stroke (CMS/[facility])      Past Surgical History:   Procedure Laterality Date    AORTIC VALVE REPLACEMENT  2020    bioprosthetic    CORONARY ARTERY BYPASS GRAFT      IR CVC TUNNELED  09/09/2022    IR CVC TUNNELED 9/9/2022 [facility] INPATIENT LEGAC[facility]    IR CVC TUNNELED  12/28/2022    IR CVC TUNNELED 12/28/2022 [facility] INPATIENT LEGACY    MITRAL VALVE REPLACEMENT  2013    bioprosthetic    MITRAL VALVE REPLACEMENT  2020    bioprosthetic    PARATHYROIDECTOMY      US GUIDED PERCUTANEOUS PLACEMENT  07/14/2022    US GUIDED PERCUTANEOUS PLACEMENT [facility] EMERGENCY LEGACY     Family History   Problem Relation Name Age of Onset    No Known Problems Mother      No Known  Problems Father       Social History     Tobacco Use    Smoking status: Never    Smokeless tobacco: Never   Vaping Use    Vaping Use: Never used   Substance Use Topics    Alcohol use: Never    Drug use: Never       Physical Exam   ED Triage Vitals [02/08/24 2253]   Temperature Heart Rate Respirations BP   37.4 °C (99.3 °F) (!) 105 18 (!) 100/41      Pulse Ox Temp Source Heart Rate Source Patient Position   99 % Temporal -- Sitting      BP Location FiO2 (%)     Right leg --       Physical Exam  Vitals and nursing note reviewed.   Constitutional:       Appearance: Normal appearance. She is normal weight.   HENT:      Head: Normocephalic and atraumatic.      Nose: Nose normal.      Mouth/Throat:      Mouth: Mucous membranes are moist.   Eyes:      Extraocular Movements: Extraocular movements intact.      Conjunctiva/sclera: Conjunctivae normal.      Pupils: Pupils are equal, round, and reactive to light.   Cardiovascular:      Rate and Rhythm: Normal rate and regular rhythm.      Pulses: Normal pulses.      Heart sounds: Normal heart sounds. No murmur heard.     No friction rub. No gallop.   Pulmonary:      Effort: Pulmonary effort is normal.      Breath sounds: Normal breath sounds. No wheezing, rhonchi or rales.   Abdominal:      General: Abdomen is flat.      Palpations: Abdomen is soft.      Tenderness: There is no abdominal tenderness. There is no guarding or rebound.   Musculoskeletal:         General: No swelling, tenderness or deformity. Normal range of motion.      Right lower leg: No edema.      Left lower leg: No edema.   Skin:     General: Skin is warm and dry.   Neurological:      General: No focal deficit present.      Mental Status: She is alert and oriented to person, place, and time.   Psychiatric:         Mood and Affect: Mood normal.         Behavior: Behavior normal.         ED Course & MDM   ED Course as of 02/09/24 0051   Fri Feb 09, 2024   0044 Spoke with Dr. Pedersen and he said a transfusion is not  needed at this time as patient had a hemoglobin of 6.5 at dialysis and it is now 7.6.  [AJ]      ED Course User Index  [AJ] Taryn Pressley PA-C         Diagnoses as of 02/09/24 0051   End stage renal disease (CMS/HCC)   Anemia, unspecified type       Medical Decision Making  Parts of this chart have been completed using voice recognition software. Please excuse any errors of transcription. Despite the medical decision making time stamp above-my medical decision making has taken place during the patient's entire visit. My thought process and reason for plan has been formulated from the time that I saw the patient until the time of disposition and is not specific to one specific moment during their visit and furthermore my MDM encompasses this entire chart and not only this text box.    Evaluated this patient independently and my supervising physician was available for consultation.    HPI: Detailed above.    Exam: A medically appropriate exam performed, outlined above, given the known history and presentation.    History obtained from: Patient    Labs/Diagnostics:  Labs Reviewed   CBC WITH AUTO DIFFERENTIAL - Abnormal       Result Value    WBC 7.8      nRBC 0.0      RBC 3.33 (*)     Hemoglobin 7.6 (*)     Hematocrit 26.3 (*)     MCV 79 (*)     MCH 22.8 (*)     MCHC 28.9 (*)     RDW 17.9 (*)     Platelets 241      Neutrophils % 66.1      Immature Granulocytes %, Automated 0.3      Lymphocytes % 18.8      Monocytes % 7.7      Eosinophils % 6.5      Basophils % 0.6      Neutrophils Absolute 5.12      Immature Granulocytes Absolute, Automated 0.02      Lymphocytes Absolute 1.46      Monocytes Absolute 0.60      Eosinophils Absolute 0.50      Basophils Absolute 0.05     BASIC METABOLIC PANEL - Abnormal    Glucose 101 (*)     Sodium 134      Potassium 5.3 (*)     Chloride 88 (*)     Bicarbonate 26      Urea Nitrogen 47 (*)     Creatinine 8.20 (*)     eGFR 6 (*)     Calcium 7.8 (*)     Anion Gap >19 (*)    TYPE AND SCREEN  "   ABO TYPE A      Rh TYPE NEG      ANTIBODY SCREEN NEG      Narrative:     Review your Rh Negative female patient's potential need for Rh Immune Globulin (RhIg)administration.     EMERGENCY DEPARTMENT COURSE and DIFFERENTIAL DIAGNOSIS/MDM:  Patient is a 48-year-old female presenting to the emergency department for evaluation of decreased hemoglobin.  On physical exam vital signs remarkable for hypotension but otherwise stable and patient is in no acute distress.  Diagnostic labs ordered.  BMP remarkable for potassium of 5.3 and a chloride of 88 as well as a creatinine of 8.2 and EGFR of 6 however this is consistent with patient's end-stage renal disease.  She has dialysis tomorrow morning.  CBC showed a hemoglobin of 7.6 and therefore no transfusion necessary at this time.  Spoke with  who agreed with discharging the patient.  She will go to dialysis tomorrow as scheduled which will correct her potassium.  Patient discharged in stable condition.  She will follow-up with Dr. Pedersen outpatient.  She will return to the emergency department with any new or worsening symptoms.    Vitals:    Vitals:    02/08/24 2253 02/09/24 0044   BP: (!) 100/41 138/69   BP Location: Right leg    Patient Position: Sitting    Pulse: (!) 105 87   Resp: 18 17   Temp: 37.4 °C (99.3 °F)    TempSrc: Temporal    SpO2: 99%    Weight: 62 kg (136 lb 11 oz)    Height: 1.727 m (5' 8\")      History Limited by:    None    Independent history obtained from:    None      External records reviewed:    Outpatient Note nephrology      Diagnostics interpreted by me:    None      Discussions with other clinicians:    Nephrology Dr. Pedersen      Chronic conditions impacting care:    End-stage renal disease      Social determinants of health affecting care:    None    ED Medications managed:    Medications - No data to display      Prescription drugs considered:    None    Procedure  Procedures     Taryn Pressley PA-C  02/09/24 0052    "

## 2024-02-09 NOTE — ED NOTES
Per patient she states that Dr. Pedersen wanted her to come in for low blood counts. Patient states bilateral leg pain and feeling more tired than normal. Patient states she goes to dialysis MW and hasn't missed any.      Kory Mukherjee RN  02/08/24 3288

## 2024-02-09 NOTE — DISCHARGE INSTRUCTIONS
The emergency department today for evaluation of low hemoglobin.  Your hemoglobin today is 7.6.  We recommend you go to dialysis tomorrow.  I also recommend repeat labs within the next week.  Return to the emergency department at anytime with any new or worsening symptoms.  Follow-up with  within the next 1 to 2 days.

## 2024-04-04 ENCOUNTER — APPOINTMENT (OUTPATIENT)
Dept: CARDIOLOGY | Facility: HOSPITAL | Age: 49
DRG: 640 | End: 2024-04-04
Payer: MEDICARE

## 2024-04-04 ENCOUNTER — APPOINTMENT (OUTPATIENT)
Dept: DIALYSIS | Facility: HOSPITAL | Age: 49
End: 2024-04-04
Payer: MEDICARE

## 2024-04-04 ENCOUNTER — HOSPITAL ENCOUNTER (INPATIENT)
Facility: HOSPITAL | Age: 49
LOS: 2 days | Discharge: HOME | DRG: 640 | End: 2024-04-06
Attending: EMERGENCY MEDICINE | Admitting: INTERNAL MEDICINE
Payer: MEDICARE

## 2024-04-04 DIAGNOSIS — F41.8 DEPRESSION WITH ANXIETY: ICD-10-CM

## 2024-04-04 DIAGNOSIS — E87.5 HYPERKALEMIA: Primary | ICD-10-CM

## 2024-04-04 LAB
ALBUMIN SERPL-MCNC: 4.1 G/DL (ref 3.5–5)
ALP BLD-CCNC: 67 U/L (ref 35–125)
ALT SERPL-CCNC: 6 U/L (ref 5–40)
ANION GAP SERPL CALC-SCNC: 13 MMOL/L
AST SERPL-CCNC: 14 U/L (ref 5–40)
ATRIAL RATE: 60 BPM
BASOPHILS # BLD AUTO: 0.06 X10*3/UL (ref 0–0.1)
BASOPHILS NFR BLD AUTO: 0.9 %
BILIRUB SERPL-MCNC: 0.3 MG/DL (ref 0.1–1.2)
BUN SERPL-MCNC: 26 MG/DL (ref 8–25)
CALCIUM SERPL-MCNC: 8.6 MG/DL (ref 8.5–10.4)
CHLORIDE SERPL-SCNC: 89 MMOL/L (ref 97–107)
CO2 SERPL-SCNC: 29 MMOL/L (ref 24–31)
CREAT SERPL-MCNC: 6.1 MG/DL (ref 0.4–1.6)
EGFRCR SERPLBLD CKD-EPI 2021: 8 ML/MIN/1.73M*2
EOSINOPHIL # BLD AUTO: 0.38 X10*3/UL (ref 0–0.7)
EOSINOPHIL NFR BLD AUTO: 5.6 %
ERYTHROCYTE [DISTWIDTH] IN BLOOD BY AUTOMATED COUNT: 22.7 % (ref 11.5–14.5)
FLUAV RNA RESP QL NAA+PROBE: NOT DETECTED
FLUBV RNA RESP QL NAA+PROBE: NOT DETECTED
GLUCOSE BLD MANUAL STRIP-MCNC: 102 MG/DL (ref 74–99)
GLUCOSE SERPL-MCNC: 102 MG/DL (ref 65–99)
HCT VFR BLD AUTO: 31 % (ref 36–46)
HGB BLD-MCNC: 9.2 G/DL (ref 12–16)
HYPOCHROMIA BLD QL SMEAR: NORMAL
IMM GRANULOCYTES # BLD AUTO: 0.01 X10*3/UL (ref 0–0.7)
IMM GRANULOCYTES NFR BLD AUTO: 0.1 % (ref 0–0.9)
LACTATE BLDV-SCNC: 1.2 MMOL/L (ref 0.4–2)
LYMPHOCYTES # BLD AUTO: 1.22 X10*3/UL (ref 1.2–4.8)
LYMPHOCYTES NFR BLD AUTO: 18.1 %
MCH RBC QN AUTO: 26.8 PG (ref 26–34)
MCHC RBC AUTO-ENTMCNC: 29.7 G/DL (ref 32–36)
MCV RBC AUTO: 90 FL (ref 80–100)
MONOCYTES # BLD AUTO: 0.5 X10*3/UL (ref 0.1–1)
MONOCYTES NFR BLD AUTO: 7.4 %
NEUTROPHILS # BLD AUTO: 4.58 X10*3/UL (ref 1.2–7.7)
NEUTROPHILS NFR BLD AUTO: 67.9 %
NRBC BLD-RTO: 0 /100 WBCS (ref 0–0)
P AXIS: 110 DEGREES
P OFFSET: 191 MS
P ONSET: 136 MS
PLATELET # BLD AUTO: 207 X10*3/UL (ref 150–450)
POTASSIUM SERPL-SCNC: 6.3 MMOL/L (ref 3.4–5.1)
PR INTERVAL: 176 MS
PROT SERPL-MCNC: 8.2 G/DL (ref 5.9–7.9)
Q ONSET: 224 MS
QRS COUNT: 10 BEATS
QRS DURATION: 68 MS
QT INTERVAL: 418 MS
QTC CALCULATION(BAZETT): 418 MS
QTC FREDERICIA: 418 MS
R AXIS: 178 DEGREES
RBC # BLD AUTO: 3.43 X10*6/UL (ref 4–5.2)
RBC MORPH BLD: NORMAL
SARS-COV-2 RNA RESP QL NAA+PROBE: NOT DETECTED
SODIUM SERPL-SCNC: 131 MMOL/L (ref 133–145)
STOMATOCYTES BLD QL SMEAR: NORMAL
T AXIS: 130 DEGREES
T OFFSET: 433 MS
VENTRICULAR RATE: 60 BPM
WBC # BLD AUTO: 6.8 X10*3/UL (ref 4.4–11.3)

## 2024-04-04 PROCEDURE — 2500000004 HC RX 250 GENERAL PHARMACY W/ HCPCS (ALT 636 FOR OP/ED): Performed by: INTERNAL MEDICINE

## 2024-04-04 PROCEDURE — 2500000002 HC RX 250 W HCPCS SELF ADMINISTERED DRUGS (ALT 637 FOR MEDICARE OP, ALT 636 FOR OP/ED): Performed by: EMERGENCY MEDICINE

## 2024-04-04 PROCEDURE — 2500000005 HC RX 250 GENERAL PHARMACY W/O HCPCS: Performed by: EMERGENCY MEDICINE

## 2024-04-04 PROCEDURE — 36415 COLL VENOUS BLD VENIPUNCTURE: CPT | Performed by: EMERGENCY MEDICINE

## 2024-04-04 PROCEDURE — 85025 COMPLETE CBC W/AUTO DIFF WBC: CPT | Performed by: EMERGENCY MEDICINE

## 2024-04-04 PROCEDURE — 87040 BLOOD CULTURE FOR BACTERIA: CPT | Mod: WESLAB | Performed by: EMERGENCY MEDICINE

## 2024-04-04 PROCEDURE — 90937 HEMODIALYSIS REPEATED EVAL: CPT

## 2024-04-04 PROCEDURE — 2500000001 HC RX 250 WO HCPCS SELF ADMINISTERED DRUGS (ALT 637 FOR MEDICARE OP): Performed by: INTERNAL MEDICINE

## 2024-04-04 PROCEDURE — 2500000004 HC RX 250 GENERAL PHARMACY W/ HCPCS (ALT 636 FOR OP/ED): Performed by: EMERGENCY MEDICINE

## 2024-04-04 PROCEDURE — 96375 TX/PRO/DX INJ NEW DRUG ADDON: CPT | Mod: 59

## 2024-04-04 PROCEDURE — 3E1M39Z IRRIGATION OF PERITONEAL CAVITY USING DIALYSATE, PERCUTANEOUS APPROACH: ICD-10-PCS | Performed by: INTERNAL MEDICINE

## 2024-04-04 PROCEDURE — 83605 ASSAY OF LACTIC ACID: CPT | Performed by: EMERGENCY MEDICINE

## 2024-04-04 PROCEDURE — 93005 ELECTROCARDIOGRAM TRACING: CPT

## 2024-04-04 PROCEDURE — 2500000004 HC RX 250 GENERAL PHARMACY W/ HCPCS (ALT 636 FOR OP/ED): Performed by: HOSPITALIST

## 2024-04-04 PROCEDURE — 99291 CRITICAL CARE FIRST HOUR: CPT

## 2024-04-04 PROCEDURE — 8010000001 HC DIALYSIS - HEMODIALYSIS PER DAY

## 2024-04-04 PROCEDURE — 82947 ASSAY GLUCOSE BLOOD QUANT: CPT

## 2024-04-04 PROCEDURE — 2500000001 HC RX 250 WO HCPCS SELF ADMINISTERED DRUGS (ALT 637 FOR MEDICARE OP): Performed by: EMERGENCY MEDICINE

## 2024-04-04 PROCEDURE — 87636 SARSCOV2 & INF A&B AMP PRB: CPT | Performed by: EMERGENCY MEDICINE

## 2024-04-04 PROCEDURE — 80053 COMPREHEN METABOLIC PANEL: CPT | Performed by: EMERGENCY MEDICINE

## 2024-04-04 PROCEDURE — 2060000001 HC INTERMEDIATE ICU ROOM DAILY

## 2024-04-04 PROCEDURE — 96374 THER/PROPH/DIAG INJ IV PUSH: CPT | Mod: 59

## 2024-04-04 RX ORDER — ONDANSETRON 4 MG/1
4 TABLET, ORALLY DISINTEGRATING ORAL 4 TIMES DAILY PRN
Status: DISCONTINUED | OUTPATIENT
Start: 2024-04-04 | End: 2024-04-06 | Stop reason: HOSPADM

## 2024-04-04 RX ORDER — DIPHENHYDRAMINE HYDROCHLORIDE 50 MG/ML
25 INJECTION INTRAMUSCULAR; INTRAVENOUS ONCE
Status: COMPLETED | OUTPATIENT
Start: 2024-04-04 | End: 2024-04-04

## 2024-04-04 RX ORDER — DIPHENHYDRAMINE HCL 25 MG
25 TABLET ORAL EVERY 6 HOURS PRN
Status: DISCONTINUED | OUTPATIENT
Start: 2024-04-04 | End: 2024-04-04

## 2024-04-04 RX ORDER — ACETAMINOPHEN 325 MG/1
650 TABLET ORAL EVERY 6 HOURS PRN
Status: DISCONTINUED | OUTPATIENT
Start: 2024-04-04 | End: 2024-04-06 | Stop reason: HOSPADM

## 2024-04-04 RX ORDER — OXYCODONE AND ACETAMINOPHEN 5; 325 MG/1; MG/1
1 TABLET ORAL EVERY 6 HOURS PRN
Status: DISCONTINUED | OUTPATIENT
Start: 2024-04-04 | End: 2024-04-06 | Stop reason: HOSPADM

## 2024-04-04 RX ORDER — DEXTROSE 50 % IN WATER (D50W) INTRAVENOUS SYRINGE
25 ONCE
Status: COMPLETED | OUTPATIENT
Start: 2024-04-04 | End: 2024-04-04

## 2024-04-04 RX ORDER — HEPARIN SODIUM 1000 [USP'U]/ML
1000 INJECTION, SOLUTION INTRAVENOUS; SUBCUTANEOUS
Status: DISCONTINUED | OUTPATIENT
Start: 2024-04-05 | End: 2024-04-06 | Stop reason: HOSPADM

## 2024-04-04 RX ORDER — MORPHINE SULFATE 4 MG/ML
4 INJECTION, SOLUTION INTRAMUSCULAR; INTRAVENOUS ONCE
Status: COMPLETED | OUTPATIENT
Start: 2024-04-04 | End: 2024-04-04

## 2024-04-04 RX ORDER — VANCOMYCIN 1 G/200ML
1000 INJECTION, SOLUTION INTRAVENOUS ONCE
Status: DISCONTINUED | OUTPATIENT
Start: 2024-04-04 | End: 2024-04-05

## 2024-04-04 RX ORDER — CALCITRIOL 0.25 UG/1
0.5 CAPSULE ORAL DAILY
Status: DISCONTINUED | OUTPATIENT
Start: 2024-04-04 | End: 2024-04-06 | Stop reason: HOSPADM

## 2024-04-04 RX ORDER — BUPROPION HYDROCHLORIDE 100 MG/1
100 TABLET ORAL EVERY OTHER DAY
Status: DISCONTINUED | OUTPATIENT
Start: 2024-04-05 | End: 2024-04-06 | Stop reason: HOSPADM

## 2024-04-04 RX ORDER — PREGABALIN 25 MG/1
25 CAPSULE ORAL 2 TIMES DAILY
Status: DISCONTINUED | OUTPATIENT
Start: 2024-04-04 | End: 2024-04-06 | Stop reason: HOSPADM

## 2024-04-04 RX ORDER — CLONIDINE HYDROCHLORIDE 0.1 MG/1
0.1 TABLET ORAL EVERY 8 HOURS SCHEDULED
Status: DISCONTINUED | OUTPATIENT
Start: 2024-04-04 | End: 2024-04-06 | Stop reason: HOSPADM

## 2024-04-04 RX ORDER — HEPARIN SODIUM 5000 [USP'U]/ML
5000 INJECTION, SOLUTION INTRAVENOUS; SUBCUTANEOUS EVERY 12 HOURS
Status: DISCONTINUED | OUTPATIENT
Start: 2024-04-04 | End: 2024-04-06 | Stop reason: HOSPADM

## 2024-04-04 RX ORDER — DIPHENHYDRAMINE HYDROCHLORIDE 50 MG/ML
25 INJECTION INTRAMUSCULAR; INTRAVENOUS EVERY 6 HOURS
Status: DISCONTINUED | OUTPATIENT
Start: 2024-04-04 | End: 2024-04-06 | Stop reason: HOSPADM

## 2024-04-04 RX ORDER — HEPARIN SODIUM 1000 [USP'U]/ML
1000 INJECTION, SOLUTION INTRAVENOUS; SUBCUTANEOUS ONCE
Status: COMPLETED | OUTPATIENT
Start: 2024-04-04 | End: 2024-04-04

## 2024-04-04 RX ORDER — METOPROLOL SUCCINATE 50 MG/1
50 TABLET, EXTENDED RELEASE ORAL 2 TIMES DAILY
Status: DISCONTINUED | OUTPATIENT
Start: 2024-04-04 | End: 2024-04-06 | Stop reason: HOSPADM

## 2024-04-04 RX ORDER — MORPHINE SULFATE 2 MG/ML
2 INJECTION, SOLUTION INTRAMUSCULAR; INTRAVENOUS EVERY 4 HOURS PRN
Status: DISCONTINUED | OUTPATIENT
Start: 2024-04-04 | End: 2024-04-06 | Stop reason: HOSPADM

## 2024-04-04 RX ORDER — SODIUM BICARBONATE 1 MEQ/ML
50 SYRINGE (ML) INTRAVENOUS ONCE
Status: COMPLETED | OUTPATIENT
Start: 2024-04-04 | End: 2024-04-04

## 2024-04-04 RX ORDER — ATORVASTATIN CALCIUM 40 MG/1
40 TABLET, FILM COATED ORAL DAILY
Status: DISCONTINUED | OUTPATIENT
Start: 2024-04-04 | End: 2024-04-06 | Stop reason: HOSPADM

## 2024-04-04 RX ORDER — GUAIFENESIN 100 MG/5ML
200 SOLUTION ORAL EVERY 4 HOURS PRN
Status: DISCONTINUED | OUTPATIENT
Start: 2024-04-04 | End: 2024-04-06 | Stop reason: HOSPADM

## 2024-04-04 RX ORDER — OXYCODONE AND ACETAMINOPHEN 5; 325 MG/1; MG/1
1 TABLET ORAL ONCE
Status: COMPLETED | OUTPATIENT
Start: 2024-04-04 | End: 2024-04-04

## 2024-04-04 RX ORDER — ASPIRIN 81 MG/1
81 TABLET ORAL DAILY
Status: DISCONTINUED | OUTPATIENT
Start: 2024-04-04 | End: 2024-04-06 | Stop reason: HOSPADM

## 2024-04-04 RX ORDER — LEVETIRACETAM 500 MG/1
500 TABLET ORAL NIGHTLY
Status: DISCONTINUED | OUTPATIENT
Start: 2024-04-04 | End: 2024-04-05

## 2024-04-04 RX ORDER — ERGOCALCIFEROL 1.25 MG/1
1250 CAPSULE ORAL
Status: DISCONTINUED | OUTPATIENT
Start: 2024-04-10 | End: 2024-04-06 | Stop reason: HOSPADM

## 2024-04-04 RX ORDER — ACETAMINOPHEN 500 MG
5 TABLET ORAL NIGHTLY PRN
Status: DISCONTINUED | OUTPATIENT
Start: 2024-04-04 | End: 2024-04-06 | Stop reason: HOSPADM

## 2024-04-04 RX ORDER — DIPHENHYDRAMINE HYDROCHLORIDE 50 MG/ML
50 INJECTION INTRAMUSCULAR; INTRAVENOUS ONCE
Status: COMPLETED | OUTPATIENT
Start: 2024-04-04 | End: 2024-04-04

## 2024-04-04 RX ORDER — DIPHENHYDRAMINE HYDROCHLORIDE 50 MG/ML
25 INJECTION INTRAMUSCULAR; INTRAVENOUS ONCE
Status: DISCONTINUED | OUTPATIENT
Start: 2024-04-04 | End: 2024-04-04

## 2024-04-04 RX ORDER — ONDANSETRON HYDROCHLORIDE 2 MG/ML
4 INJECTION, SOLUTION INTRAVENOUS EVERY 6 HOURS PRN
Status: DISCONTINUED | OUTPATIENT
Start: 2024-04-04 | End: 2024-04-06 | Stop reason: HOSPADM

## 2024-04-04 RX ORDER — HYDRALAZINE HYDROCHLORIDE 50 MG/1
50 TABLET, FILM COATED ORAL 3 TIMES DAILY
Status: DISCONTINUED | OUTPATIENT
Start: 2024-04-04 | End: 2024-04-06 | Stop reason: HOSPADM

## 2024-04-04 RX ADMIN — MORPHINE SULFATE 2 MG: 2 INJECTION, SOLUTION INTRAMUSCULAR; INTRAVENOUS at 16:32

## 2024-04-04 RX ADMIN — DIPHENHYDRAMINE HYDROCHLORIDE 50 MG: 50 INJECTION, SOLUTION INTRAMUSCULAR; INTRAVENOUS at 07:32

## 2024-04-04 RX ADMIN — CLONIDINE HYDROCHLORIDE 0.1 MG: 0.1 TABLET ORAL at 21:07

## 2024-04-04 RX ADMIN — PREGABALIN 25 MG: 25 CAPSULE ORAL at 21:08

## 2024-04-04 RX ADMIN — OXYCODONE AND ACETAMINOPHEN 1 TABLET: 5; 325 TABLET ORAL at 15:21

## 2024-04-04 RX ADMIN — ATORVASTATIN CALCIUM 40 MG: 40 TABLET, FILM COATED ORAL at 12:08

## 2024-04-04 RX ADMIN — INSULIN HUMAN 5 UNITS: 100 INJECTION, SOLUTION PARENTERAL at 05:32

## 2024-04-04 RX ADMIN — HEPARIN SODIUM 2100 UNITS: 1000 INJECTION INTRAVENOUS; SUBCUTANEOUS at 10:46

## 2024-04-04 RX ADMIN — SODIUM BICARBONATE 50 MEQ: 84 INJECTION INTRAVENOUS at 05:32

## 2024-04-04 RX ADMIN — MORPHINE SULFATE 2 MG: 2 INJECTION, SOLUTION INTRAMUSCULAR; INTRAVENOUS at 21:10

## 2024-04-04 RX ADMIN — PIPERACILLIN SODIUM AND TAZOBACTAM SODIUM 2.25 G: 2; .25 INJECTION, SOLUTION INTRAVENOUS at 15:47

## 2024-04-04 RX ADMIN — DIPHENHYDRAMINE HYDROCHLORIDE 25 MG: 50 INJECTION, SOLUTION INTRAMUSCULAR; INTRAVENOUS at 21:10

## 2024-04-04 RX ADMIN — MORPHINE SULFATE 2 MG: 2 INJECTION, SOLUTION INTRAMUSCULAR; INTRAVENOUS at 12:07

## 2024-04-04 RX ADMIN — LEVETIRACETAM 500 MG: 500 TABLET, FILM COATED ORAL at 21:08

## 2024-04-04 RX ADMIN — MORPHINE SULFATE 4 MG: 4 INJECTION, SOLUTION INTRAMUSCULAR; INTRAVENOUS at 05:32

## 2024-04-04 RX ADMIN — METOPROLOL SUCCINATE 50 MG: 50 TABLET, EXTENDED RELEASE ORAL at 21:08

## 2024-04-04 RX ADMIN — CALCITRIOL CAPSULES 0.25 MCG 0.5 MCG: 0.25 CAPSULE ORAL at 12:08

## 2024-04-04 RX ADMIN — PIPERACILLIN SODIUM AND TAZOBACTAM SODIUM 2.25 G: 2; .25 INJECTION, SOLUTION INTRAVENOUS at 23:49

## 2024-04-04 RX ADMIN — DIPHENHYDRAMINE HYDROCHLORIDE 25 MG: 50 INJECTION, SOLUTION INTRAMUSCULAR; INTRAVENOUS at 15:47

## 2024-04-04 RX ADMIN — DEXTROSE MONOHYDRATE 25 G: 25 INJECTION, SOLUTION INTRAVENOUS at 05:32

## 2024-04-04 RX ADMIN — ASPIRIN 81 MG: 81 TABLET, COATED ORAL at 12:08

## 2024-04-04 RX ADMIN — OXYCODONE AND ACETAMINOPHEN 1 TABLET: 5; 325 TABLET ORAL at 04:04

## 2024-04-04 RX ADMIN — HYDRALAZINE HYDROCHLORIDE 50 MG: 50 TABLET ORAL at 21:16

## 2024-04-04 SDOH — SOCIAL STABILITY: SOCIAL INSECURITY: DOES ANYONE TRY TO KEEP YOU FROM HAVING/CONTACTING OTHER FRIENDS OR DOING THINGS OUTSIDE YOUR HOME?: NO

## 2024-04-04 SDOH — SOCIAL STABILITY: SOCIAL INSECURITY: DO YOU FEEL UNSAFE GOING BACK TO THE PLACE WHERE YOU ARE LIVING?: NO

## 2024-04-04 SDOH — SOCIAL STABILITY: SOCIAL INSECURITY: ARE THERE ANY APPARENT SIGNS OF INJURIES/BEHAVIORS THAT COULD BE RELATED TO ABUSE/NEGLECT?: NO

## 2024-04-04 SDOH — SOCIAL STABILITY: SOCIAL INSECURITY: ABUSE: ADULT

## 2024-04-04 SDOH — SOCIAL STABILITY: SOCIAL INSECURITY: HAVE YOU HAD THOUGHTS OF HARMING ANYONE ELSE?: NO

## 2024-04-04 SDOH — SOCIAL STABILITY: SOCIAL INSECURITY: DO YOU FEEL ANYONE HAS EXPLOITED OR TAKEN ADVANTAGE OF YOU FINANCIALLY OR OF YOUR PERSONAL PROPERTY?: NO

## 2024-04-04 SDOH — SOCIAL STABILITY: SOCIAL INSECURITY: HAS ANYONE EVER THREATENED TO HURT YOUR FAMILY OR YOUR PETS?: NO

## 2024-04-04 SDOH — SOCIAL STABILITY: SOCIAL INSECURITY: ARE YOU OR HAVE YOU BEEN THREATENED OR ABUSED PHYSICALLY, EMOTIONALLY, OR SEXUALLY BY ANYONE?: NO

## 2024-04-04 SDOH — SOCIAL STABILITY: SOCIAL INSECURITY: WERE YOU ABLE TO COMPLETE ALL THE BEHAVIORAL HEALTH SCREENINGS?: YES

## 2024-04-04 ASSESSMENT — LIFESTYLE VARIABLES
HOW MANY STANDARD DRINKS CONTAINING ALCOHOL DO YOU HAVE ON A TYPICAL DAY: PATIENT DOES NOT DRINK
HOW OFTEN DO YOU HAVE A DRINK CONTAINING ALCOHOL: NEVER
PRESCIPTION_ABUSE_PAST_12_MONTHS: NO
EVER HAD A DRINK FIRST THING IN THE MORNING TO STEADY YOUR NERVES TO GET RID OF A HANGOVER: NO
HAVE PEOPLE ANNOYED YOU BY CRITICIZING YOUR DRINKING: NO
TOTAL SCORE: 0
HOW OFTEN DO YOU HAVE A DRINK CONTAINING ALCOHOL: NEVER
AUDIT-C TOTAL SCORE: 0
SKIP TO QUESTIONS 9-10: 1
HOW OFTEN DO YOU HAVE 6 OR MORE DRINKS ON ONE OCCASION: NEVER
AUDIT-C TOTAL SCORE: 0
HOW MANY STANDARD DRINKS CONTAINING ALCOHOL DO YOU HAVE ON A TYPICAL DAY: PATIENT DOES NOT DRINK
AUDIT-C TOTAL SCORE: 0
SKIP TO QUESTIONS 9-10: 1
AUDIT-C TOTAL SCORE: 0
HAVE YOU EVER FELT YOU SHOULD CUT DOWN ON YOUR DRINKING: NO
HOW OFTEN DO YOU HAVE 6 OR MORE DRINKS ON ONE OCCASION: NEVER
EVER FELT BAD OR GUILTY ABOUT YOUR DRINKING: NO
SUBSTANCE_ABUSE_PAST_12_MONTHS: NO

## 2024-04-04 ASSESSMENT — ACTIVITIES OF DAILY LIVING (ADL)
DRESSING YOURSELF: INDEPENDENT
BATHING: INDEPENDENT
FEEDING YOURSELF: INDEPENDENT
PATIENT'S MEMORY ADEQUATE TO SAFELY COMPLETE DAILY ACTIVITIES?: YES
TOILETING: INDEPENDENT
GROOMING: INDEPENDENT
ASSISTIVE_DEVICE: CANE;EYEGLASSES
HEARING - RIGHT EAR: FUNCTIONAL
HEARING - LEFT EAR: FUNCTIONAL
LACK_OF_TRANSPORTATION: NO
JUDGMENT_ADEQUATE_SAFELY_COMPLETE_DAILY_ACTIVITIES: YES
WALKS IN HOME: INDEPENDENT
ADEQUATE_TO_COMPLETE_ADL: YES

## 2024-04-04 ASSESSMENT — COGNITIVE AND FUNCTIONAL STATUS - GENERAL
PATIENT BASELINE BEDBOUND: NO
MOBILITY SCORE: 24
DAILY ACTIVITIY SCORE: 24

## 2024-04-04 ASSESSMENT — PAIN SCALES - GENERAL
PAINLEVEL_OUTOF10: 6
PAINLEVEL_OUTOF10: 10 - WORST POSSIBLE PAIN
PAINLEVEL_OUTOF10: 7
PAINLEVEL_OUTOF10: 9
PAINLEVEL_OUTOF10: 10 - WORST POSSIBLE PAIN
PAINLEVEL_OUTOF10: 6
PAINLEVEL_OUTOF10: 0 - NO PAIN
PAINLEVEL_OUTOF10: 6
PAINLEVEL_OUTOF10: 6
PAINLEVEL_OUTOF10: 0 - NO PAIN

## 2024-04-04 ASSESSMENT — PATIENT HEALTH QUESTIONNAIRE - PHQ9
1. LITTLE INTEREST OR PLEASURE IN DOING THINGS: SEVERAL DAYS
SUM OF ALL RESPONSES TO PHQ9 QUESTIONS 1 & 2: 2
SUM OF ALL RESPONSES TO PHQ9 QUESTIONS 1 & 2: 2
1. LITTLE INTEREST OR PLEASURE IN DOING THINGS: SEVERAL DAYS
2. FEELING DOWN, DEPRESSED OR HOPELESS: SEVERAL DAYS
2. FEELING DOWN, DEPRESSED OR HOPELESS: SEVERAL DAYS

## 2024-04-04 ASSESSMENT — PAIN - FUNCTIONAL ASSESSMENT
PAIN_FUNCTIONAL_ASSESSMENT: FLACC (FACE, LEGS, ACTIVITY, CRY, CONSOLABILITY)
PAIN_FUNCTIONAL_ASSESSMENT: 0-10
PAIN_FUNCTIONAL_ASSESSMENT: FLACC (FACE, LEGS, ACTIVITY, CRY, CONSOLABILITY)
PAIN_FUNCTIONAL_ASSESSMENT: 0-10

## 2024-04-04 ASSESSMENT — ENCOUNTER SYMPTOMS
DIARRHEA: 0
NAUSEA: 1
ABDOMINAL PAIN: 0
ACTIVITY CHANGE: 1
BLOOD IN STOOL: 0
COUGH: 0
FATIGUE: 1
APPETITE CHANGE: 1
WHEEZING: 0
PALPITATIONS: 0
CONSTIPATION: 0
BACK PAIN: 0
FEVER: 0
HEADACHES: 0
SHORTNESS OF BREATH: 0
HEMATURIA: 0
NECK PAIN: 0
CHEST TIGHTNESS: 0
CHILLS: 0
VOMITING: 0
MYALGIAS: 1

## 2024-04-04 ASSESSMENT — PAIN DESCRIPTION - ORIENTATION: ORIENTATION: LEFT;RIGHT

## 2024-04-04 ASSESSMENT — PAIN DESCRIPTION - LOCATION
LOCATION: GENERALIZED
LOCATION: LEG

## 2024-04-04 NOTE — PROGRESS NOTES
.Reason For Consult  End stage kidney disease and dialysis therapy    History Of Present Illness  Batsheva Page is a 48 y.o. female who is very well-known to my practice with underlying end-stage kidney disease on dialysis therapy on Monday Wednesday Friday had recurrent mid dialysis catheter infections also bacteremia and history of endocarditis refused surgery in the past, history of neuropathy hypertension who basically presented to the emergency room with generalized weakness severe pain in lower extremities and not feeling right over she did not have any fever chills any shortness of breath chest pain she was found to be hyperkalemic patient was dialyzed this morning     Review of Systems  10 point review system was done all negative except was positive for the history of present illness    Past Medical History  She has a past medical history of Aortic valve replaced (2022), CHF (congestive heart failure) (CMS/[facility]ly [facility]), Chronic pain, Coronary artery disease, Disease of thyroid gland, ESRD (end stage renal disease) (CMS/[facility]), H/O mitral valve replacement (2013), Heart disease, History of transfusion, Hypertension, Mitral valve regurgitation, Seizures (CMS/[facility]ly [facility]), and Stroke (CMS/[facility]).    Surgical History  She has a past surgical history that includes IR CVC tunneled (09/09/2022); IR CVC tunneled (12/28/2022); US guided percutaneous placement (07/14/2022); Parathyroidectomy; Coronary artery bypass graft; Mitral valve replacement (2013); Aortic valve replacement (2020); and Mitral valve replacement (2020).     Social History  She reports that she has never smoked. She has never used smokeless tobacco. She reports that she does not drink alcohol and does not use drugs.    Family History  Family History   Problem Relation Name Age of Onset    No Known Problems Mother      No Known Problems Father          Current Facility-Administered Medications:     acetaminophen (Tylenol) tablet 650 mg, 650 mg, oral, q6h PRN,  Pamela Saldana MD    aspirin EC tablet 81 mg, 81 mg, oral, Daily, Pamela Saldana MD, 81 mg at 04/04/24 1208    atorvastatin (Lipitor) tablet 40 mg, 40 mg, oral, Daily, Pamela Saldana MD, 40 mg at 04/04/24 1208    [START ON 4/5/2024] buPROPion (Wellbutrin) tablet 100 mg, 100 mg, oral, Every other day, Pamela Saldana MD    calcitriol (Rocaltrol) capsule 0.5 mcg, 0.5 mcg, oral, Daily, Pamela Saldana MD, 0.5 mcg at 04/04/24 1208    cloNIDine (Catapres) tablet 0.1 mg, 0.1 mg, oral, q8h KIMBERLY, Pamela Saldana MD    [START ON 4/10/2024] ergocalciferol (Vitamin D-2) capsule 1,250 mcg, 1,250 mcg, oral, Weekly, Pamela Saldana MD    guaiFENesin (Robitussin) 100 mg/5 mL syrup 200 mg, 200 mg, oral, q4h PRN, Pamela Saldana MD    heparin (porcine) injection 5,000 Units, 5,000 Units, subcutaneous, q12h, Pamela Saldana MD    hydrALAZINE (Apresoline) tablet 50 mg, 50 mg, oral, TID, Pamela Saldana MD    levETIRAcetam (Keppra) tablet 500 mg, 500 mg, oral, Nightly, Pamela Saldana MD    melatonin tablet 5 mg, 5 mg, oral, Nightly PRN, Pamela Saldana MD    metoprolol succinate XL (Toprol-XL) 24 hr tablet 50 mg, 50 mg, oral, BID, Pamela Saldana MD    morphine injection 2 mg, 2 mg, intravenous, q4h PRN, Pamela Saldana MD, 2 mg at 04/04/24 1207    ondansetron (Zofran) injection 4 mg, 4 mg, intravenous, q6h PRN, Pamela Saldana MD    ondansetron ODT (Zofran-ODT) disintegrating tablet 4 mg, 4 mg, oral, 4x daily PRN, Pamela Saldana MD    oxyCODONE-acetaminophen (Percocet) 5-325 mg per tablet 1 tablet, 1 tablet, oral, q6h PRN, Pamela Saldana MD    piperacillin-tazobactam-dextrose (Zosyn) IV 2.25 g, 2.25 g, intravenous, Once, Regla Spaulding DO    piperacillin-tazobactam-dextrose (Zosyn) IV 2.25 g, 2.25 g, intravenous, q8h, Adama Soto MD    pregabalin (Lyrica) capsule 25 mg, 25 mg, oral, BID, Pamela Saldana MD    sodium chloride 0.9 % bolus 200 mL, 200 mL,  intravenous, q1h PRN, Vu Pedersen MD    sodium zirconium cyclosilicate (Lokelma) packet 10 g, 10 g, oral, Daily, Pamela Saldana MD    vancomycin (Xellia) 1 g in 200 mL (Xellia) IVPB 1,000 mg, 1,000 mg, intravenous, Once, Regla Spaulding, DO   Allergies  Kayexalate, Metoclopramide hcl, Prochlorperazine, Zofran [ondansetron hcl], and Ondansetron         Physical Exam  Physical Exam  Constitutional:       General: She is not in acute distress.     Appearance: She is not toxic-appearing.   HENT:      Head: Normocephalic and atraumatic.   Eyes:      Extraocular Movements: Extraocular movements intact.      Pupils: Pupils are equal, round, and reactive to light.   Neck:      Vascular: No carotid bruit.   Cardiovascular:      Rate and Rhythm: Normal rate and regular rhythm.   Pulmonary:      Effort: No respiratory distress.      Breath sounds: No stridor. No wheezing, rhonchi or rales.   Chest:      Chest wall: No tenderness.   Abdominal:      General: There is no distension.      Palpations: There is no mass.      Tenderness: There is no abdominal tenderness. There is no right CVA tenderness, left CVA tenderness or guarding.      Hernia: No hernia is present.   Musculoskeletal:         General: No swelling or tenderness.      Cervical back: No rigidity.      Right lower leg: No edema.      Left lower leg: No edema.   Lymphadenopathy:      Cervical: No cervical adenopathy.   Skin:     General: Skin is warm and dry.      Coloration: Skin is not jaundiced or pale.      Findings: No bruising or erythema.   Neurological:      General: No focal deficit present.      Mental Status: She is alert and oriented to person, place, and time.              I&O 24HR    Intake/Output Summary (Last 24 hours) at 4/4/2024 1308  Last data filed at 4/4/2024 1050  Gross per 24 hour   Intake 1400 ml   Output 3028 ml   Net -1628 ml       Vitals 24HR  Heart Rate:  [62-76]   Temp:  [35.7 °C (96.3 °F)-36.6 °C (97.9 °F)]   Resp:  [16]   BP:  "()/(41-65)   Height:  [172.7 cm (5' 8\")]   Weight:  [61.7 kg (136 lb)]   SpO2:  [100 %]     Relevant Results        Results for orders placed or performed during the hospital encounter of 04/04/24 (from the past 96 hour(s))   CBC and Auto Differential   Result Value Ref Range    WBC 6.8 4.4 - 11.3 x10*3/uL    nRBC 0.0 0.0 - 0.0 /100 WBCs    RBC 3.43 (L) 4.00 - 5.20 x10*6/uL    Hemoglobin 9.2 (L) 12.0 - 16.0 g/dL    Hematocrit 31.0 (L) 36.0 - 46.0 %    MCV 90 80 - 100 fL    MCH 26.8 26.0 - 34.0 pg    MCHC 29.7 (L) 32.0 - 36.0 g/dL    RDW 22.7 (H) 11.5 - 14.5 %    Platelets 207 150 - 450 x10*3/uL    Neutrophils % 67.9 40.0 - 80.0 %    Immature Granulocytes %, Automated 0.1 0.0 - 0.9 %    Lymphocytes % 18.1 13.0 - 44.0 %    Monocytes % 7.4 2.0 - 10.0 %    Eosinophils % 5.6 0.0 - 6.0 %    Basophils % 0.9 0.0 - 2.0 %    Neutrophils Absolute 4.58 1.20 - 7.70 x10*3/uL    Immature Granulocytes Absolute, Automated 0.01 0.00 - 0.70 x10*3/uL    Lymphocytes Absolute 1.22 1.20 - 4.80 x10*3/uL    Monocytes Absolute 0.50 0.10 - 1.00 x10*3/uL    Eosinophils Absolute 0.38 0.00 - 0.70 x10*3/uL    Basophils Absolute 0.06 0.00 - 0.10 x10*3/uL   Comprehensive metabolic panel   Result Value Ref Range    Glucose 102 (H) 65 - 99 mg/dL    Sodium 131 (L) 133 - 145 mmol/L    Potassium 6.3 (HH) 3.4 - 5.1 mmol/L    Chloride 89 (L) 97 - 107 mmol/L    Bicarbonate 29 24 - 31 mmol/L    Urea Nitrogen 26 (H) 8 - 25 mg/dL    Creatinine 6.10 (H) 0.40 - 1.60 mg/dL    eGFR 8 (L) >60 mL/min/1.73m*2    Calcium 8.6 8.5 - 10.4 mg/dL    Albumin 4.1 3.5 - 5.0 g/dL    Alkaline Phosphatase 67 35 - 125 U/L    Total Protein 8.2 (H) 5.9 - 7.9 g/dL    AST 14 5 - 40 U/L    Bilirubin, Total 0.3 0.1 - 1.2 mg/dL    ALT 6 5 - 40 U/L    Anion Gap 13 <=19 mmol/L   Blood Gas Lactic Acid, Venous   Result Value Ref Range    POCT Lactate, Venous 1.2 0.4 - 2.0 mmol/L   Blood Culture    Specimen: Peripheral Venipuncture; Blood culture   Result Value Ref Range    Blood " Culture Loaded on Instrument - Culture in progress    Blood Culture    Specimen: Peripheral Venipuncture; Blood culture   Result Value Ref Range    Blood Culture Loaded on Instrument - Culture in progress    Morphology   Result Value Ref Range    RBC Morphology See Below     Hypochromia Mild     Stomatocytes Few    Sars-CoV-2 and Influenza A/B PCR   Result Value Ref Range    Flu A Result Not Detected Not Detected    Flu B Result Not Detected Not Detected    Coronavirus 2019, PCR Not Detected Not Detected   ECG 12 lead   Result Value Ref Range    Ventricular Rate 60 BPM    Atrial Rate 60 BPM    VT Interval 176 ms    QRS Duration 68 ms    QT Interval 418 ms    QTC Calculation(Bazett) 418 ms    P Axis 110 degrees    R Axis 178 degrees    T Axis 130 degrees    QRS Count 10 beats    Q Onset 224 ms    P Onset 136 ms    P Offset 191 ms    T Offset 433 ms    QTC Fredericia 418 ms   POCT GLUCOSE   Result Value Ref Range    POCT Glucose 102 (H) 74 - 99 mg/dL   Blood Culture    Specimen: Central Line/Catheter (Specify below); Blood culture   Result Value Ref Range    Blood Culture Loaded on Instrument - Culture in progress           Assessment/Plan     ECG 12 lead    Result Date: 4/4/2024   Suspect arm lead reversal, interpretation assumes no reversal Normal sinus rhythm Left posterior fascicular block Abnormal ECG When compared with ECG of 08-JAN-2024 06:56, Premature supraventricular complexes are no longer Present Left posterior fascicular block is now Present T wave inversion now evident in Lateral leads Confirmed by Chava Zaidi ([id number]) on 4/4/2024 10:45:46 AM    Impression:  End-stage kidney disease  Hyperkalemia  Hypertension  Anemia of chronic kidney disease  Rule out infection which I doubt  Permacath    Recommendations:  Continue dialysis Monday Wednesday Friday I will schedule for dialysis again tomorrow to get her back on schedule  Blood cultures x 2  Epogen for anemia  Renal diet  Resume home  medications          Zhou Pedersen MD

## 2024-04-04 NOTE — NURSING NOTE
Assume care of patient. Pt lying quietly in bed, spouse at bedside. Pt denies pain or any needs at this time. Pt still working on eating dinner, pt informed that we can heat it up if she needs. Call light within reach. Continue plan of care.

## 2024-04-04 NOTE — POST-PROCEDURE NOTE
Report to Receiving RN:    Report To: Jesenia CALIX RN  Time Report Called: 1050  Hand-Off Communication: Treatment ended 42 minutes early, per patient request. 1.1 liters of fluid removed last bp 111/67 hr 62. A heparin dwell was administered. Caps secured.  Complications During Treatment: No  Ultrafiltration Treatment: Yes  Medications Administered During Dialysis: No  Blood Products Administered During Dialysis: No  Labs Sent During Dialysis: No  Heparin Drip Rate Changes: N/A  Dialysis Catheter Dressing: CHANGED TODAY DATED AND SIGNED 4/4/2024 NEEDS CHANGED 4/11/2024          Last Updated: 10:50 AM by DAVON WALSH

## 2024-04-04 NOTE — ED NOTES
Notified provider of critical lab value,potassium 6.3. Provider at patient bedside     Suleiman Yancey LPN  04/04/24 0513

## 2024-04-04 NOTE — H&P
history Of Present Illness  Batsheva Page is a 48 y.o. female presenting with malaise, generalized weakness, nausea.  Patient is an unfortunate 48-year-old white female well-known to our service.  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  She was treated with IV vancomycin and rifampin.  Patient had endocarditis of mitral and aortic valve.  Please see detailed note by Dr. Soto.  Currently, patient is being dialyzed through permacath located in the left upper chest.  Past, we had problems trying to find another vessel suitable for dialysis access.  Unfortunately we had to place permacath in the same location where she had infected catheter after he cultures were negative.   Patient presents with couple days of generalized weakness and malaise.  She is afebrile.  In the emergency department she had blood cultures drawn and she was started on vancomycin and Zosyn.  Patient was found to have potassium of 6.3 so she received medications for hyperkalemia and she already finished an emergent dialysis.  Past Medical History  She has a past medical history of Aortic valve replaced (2022), CHF (congestive heart failure) (CMS/[facility]), Chronic pain, Coronary artery disease, Disease of thyroid gland, ESRD (end stage renal disease) (CMS/[facility]), H/O mitral valve replacement (2013), Heart disease, History of transfusion, Hypertension, Mitral valve regurgitation, Seizures (CMS/[facility]), and Stroke (CMS/[facility]).  Recurrent MRSA sepsis/septicemia the most recent episode in January 2024.  Recurrent factious endocarditis  Surgical History  She has a past surgical history that includes IR CVC tunneled (09/09/2022); IR CVC tunneled (12/28/2022); US guided percutaneous placement (07/14/2022); Parathyroidectomy; Coronary artery bypass graft; Mitral valve replacement (2013); Aortic valve replacement (2020); and Mitral valve replacement  (2020).  IR CVC tunneled catheter placement in January 2024  Social History  She reports that she has never smoked. She has never used smokeless tobacco. She reports that she does not drink alcohol and does not use drugs.    Family History  Family History   Problem Relation Name Age of Onset    No Known Problems Mother      No Known Problems Father          Allergies  Kayexalate, Metoclopramide hcl, Prochlorperazine, Zofran [ondansetron hcl], and Ondansetron    Review of Systems   Constitutional:  Positive for activity change, appetite change and fatigue. Negative for chills and fever.   Respiratory:  Negative for cough, chest tightness, shortness of breath and wheezing.    Cardiovascular:  Negative for chest pain, palpitations and leg swelling.   Gastrointestinal:  Positive for nausea. Negative for abdominal pain, blood in stool, constipation, diarrhea and vomiting.   Genitourinary:  Negative for hematuria.   Musculoskeletal:  Positive for myalgias. Negative for back pain and neck pain.   Neurological:  Negative for syncope and headaches.        Physical Exam  Location: Room 446  Pi is in no apparent distress.   Cooperative with exam.  Nontoxic-appearing.  Comfortable at rest on room air.  Skin is clean, dry.  No skin lesions, rashes, ecchymosis.  Head is atraumatic, normocephalic.  Mouth mucosa is pink and moist.  No mucosal lesions.  No nasal discharge.  Musculoskeletal: No deformities, no muscle swelling.  No point tenderness to palpation.  Neck is supple, no JVD, no carotid bruits.  No lymphadenopathy.  Chest is atraumatic.  No tenderness to palpation.  There is a dialysis catheter in the left upper chest, no surrounding erythema, no crepitus, no tenderness to palpation.  Lungs are clear to auscultation bilaterally.  No wheezes, no rales, no rhonchi.  Heart: Regular rate and rhythm S1-S2.  Systolic murmur peripheral pulses are palpable in all extremities, equal.  Abdomen is soft, not tender, not distended.   Bowel sounds positive in all quadrants.  No rebound, no guarding.  Full exam deferred.  Extremities: No peripheral edema, cords, cyanosis, varices.  Neuro: Patient is alert, oriented x3.  Moves all extremities.  No gross focal neurological deficits.  Face is symmetrical.  Tongue is midline.  No visual abnormalities.  No dysarthria or aphasia.  Psychiatric: Patient is cooperative with exam, maintains good eye contact.  No evidence of psychosis, suicidal ideation or depression.   Last Recorded Vitals  BP (!) 120/41 (BP Location: Right leg, Patient Position: Lying)   Pulse 67   Temp 36.1 °C (97 °F) (Temporal)   Resp 16   Wt 61.7 kg (136 lb)   SpO2 100%     Relevant Results      Results for orders placed or performed during the hospital encounter of 04/04/24 (from the past 24 hour(s))   CBC and Auto Differential   Result Value Ref Range    WBC 6.8 4.4 - 11.3 x10*3/uL    nRBC 0.0 0.0 - 0.0 /100 WBCs    RBC 3.43 (L) 4.00 - 5.20 x10*6/uL    Hemoglobin 9.2 (L) 12.0 - 16.0 g/dL    Hematocrit 31.0 (L) 36.0 - 46.0 %    MCV 90 80 - 100 fL    MCH 26.8 26.0 - 34.0 pg    MCHC 29.7 (L) 32.0 - 36.0 g/dL    RDW 22.7 (H) 11.5 - 14.5 %    Platelets 207 150 - 450 x10*3/uL    Neutrophils % 67.9 40.0 - 80.0 %    Immature Granulocytes %, Automated 0.1 0.0 - 0.9 %    Lymphocytes % 18.1 13.0 - 44.0 %    Monocytes % 7.4 2.0 - 10.0 %    Eosinophils % 5.6 0.0 - 6.0 %    Basophils % 0.9 0.0 - 2.0 %    Neutrophils Absolute 4.58 1.20 - 7.70 x10*3/uL    Immature Granulocytes Absolute, Automated 0.01 0.00 - 0.70 x10*3/uL    Lymphocytes Absolute 1.22 1.20 - 4.80 x10*3/uL    Monocytes Absolute 0.50 0.10 - 1.00 x10*3/uL    Eosinophils Absolute 0.38 0.00 - 0.70 x10*3/uL    Basophils Absolute 0.06 0.00 - 0.10 x10*3/uL   Comprehensive metabolic panel   Result Value Ref Range    Glucose 102 (H) 65 - 99 mg/dL    Sodium 131 (L) 133 - 145 mmol/L    Potassium 6.3 (HH) 3.4 - 5.1 mmol/L    Chloride 89 (L) 97 - 107 mmol/L    Bicarbonate 29 24 - 31 mmol/L     Urea Nitrogen 26 (H) 8 - 25 mg/dL    Creatinine 6.10 (H) 0.40 - 1.60 mg/dL    eGFR 8 (L) >60 mL/min/1.73m*2    Calcium 8.6 8.5 - 10.4 mg/dL    Albumin 4.1 3.5 - 5.0 g/dL    Alkaline Phosphatase 67 35 - 125 U/L    Total Protein 8.2 (H) 5.9 - 7.9 g/dL    AST 14 5 - 40 U/L    Bilirubin, Total 0.3 0.1 - 1.2 mg/dL    ALT 6 5 - 40 U/L    Anion Gap 13 <=19 mmol/L   Blood Gas Lactic Acid, Venous   Result Value Ref Range    POCT Lactate, Venous 1.2 0.4 - 2.0 mmol/L   Blood Culture    Specimen: Peripheral Venipuncture; Blood culture   Result Value Ref Range    Blood Culture Loaded on Instrument - Culture in progress    Blood Culture    Specimen: Peripheral Venipuncture; Blood culture   Result Value Ref Range    Blood Culture Loaded on Instrument - Culture in progress    Morphology   Result Value Ref Range    RBC Morphology See Below     Hypochromia Mild     Stomatocytes Few    Sars-CoV-2 and Influenza A/B PCR   Result Value Ref Range    Flu A Result Not Detected Not Detected    Flu B Result Not Detected Not Detected    Coronavirus 2019, PCR Not Detected Not Detected   ECG 12 lead   Result Value Ref Range    Ventricular Rate 60 BPM    Atrial Rate 60 BPM    [place] Interval 176 ms    QRS Duration 68 ms    QT Interval 418 ms    QTC Calculation(Bazett) 418 ms    P Axis 110 degrees    R Axis 178 degrees    T Axis 130 degrees    QRS Count 10 beats    Q Onset 224 ms    P Onset 136 ms    P Offset 191 ms    T Offset 433 ms    QTC Fredericia 418 ms   POCT GLUCOSE   Result Value Ref Range    POCT Glucose 102 (H) 74 - 99 mg/dL   Blood Culture    Specimen: Central Line/Catheter (Specify below); Blood culture   Result Value Ref Range    Blood Culture Loaded on Instrument - Culture in progress         ECG 12 lead    Result Date: 4/4/2024   Suspect arm lead reversal, interpretation assumes no reversal Normal sinus rhythm Left posterior fascicular block Abnormal ECG When compared with ECG of 08-JAN-2024 06:56, Premature supraventricular complexes  are no longer Present Left posterior fascicular block is now Present T wave inversion now evident in Lateral leads Confirmed by Chava Zaidi ([id number]) on 4/4/2024 10:45:46 AM       Assessment/Plan   Principal Problem:    Hyperkalemia      Hyperkalemia.  Patient has a tendency to hyperkalemia.  She already had hemodialysis today.  Nephrology consulted and follows.  Low potassium diet.  Continue Lokelma.  Monitor electrolytes closely    2.  Patient has a history of recurrent MRSA septicemia/endocarditis.  With his symptoms we will address as possibility of recurrent infection.  Will treat with wide spectrum antibiotics, monitor cultures.  Discussed with Dr. Soto    3.   CODE STATUS, discussed with patient DNR okay for intubation.  No resuscitation    4.   Hypertension.  Continue medications    5.    DVT prophylaxis.  Heparin subcu      Critical care time spent with patient 52 minutes.    Pamela Saldana MD

## 2024-04-04 NOTE — ED PROVIDER NOTES
HPI   Chief Complaint   Patient presents with    Weakness, Gen       HPI  48-year-old female presents with complaint of not feeling well.  The patient is a dialysis patient.  States for the last couple days she has had generalized bodyaches, nausea.  She feels like there is an odor from her dialysis catheter in the left chest however she has not noticed any drainage.  No known fevers or chills.  She had dialysis yesterday without complication.  She contacted her nephrologist who recommended she come to the emergency department.  No vomiting or diarrhea.  No abdominal pain.  No dizziness or lightheadedness.  The patient does suffer from hypokalemia.  No other complaints.                  Anderson Coma Scale Score: 15                     Patient History   Past Medical History:   Diagnosis Date    Aortic valve replaced 2022    CHF (congestive heart failure) (CMS/[facility])     Chronic pain     Coronary artery disease     Disease of thyroid gland     ESRD (end stage renal disease) (CMS/[facility])     H/O mitral valve replacement 2013    and 2022    Heart disease     History of transfusion     Hypertension     Mitral valve regurgitation     Seizures (CMS/[facility])     Stroke (CMS/[facility])      Past Surgical History:   Procedure Laterality Date    AORTIC VALVE REPLACEMENT  2020    bioprosthetic    CORONARY ARTERY BYPASS GRAFT      IR CVC TUNNELED  09/09/2022    IR CVC TUNNELED 9/9/2022 [facility] INPATIENT LEGAC[facility]    IR CVC TUNNELED  12/28/2022    IR CVC TUNNELED 12/28/2022 [facility] INPATIENT LEGACY    MITRAL VALVE REPLACEMENT  2013    bioprosthetic    MITRAL VALVE REPLACEMENT  2020    bioprosthetic    PARATHYROIDECTOMY      US GUIDED PERCUTANEOUS PLACEMENT  07/14/2022    US GUIDED PERCUTANEOUS PLACEMENT [facility]     Family History   Problem Relation Name Age of Onset    No Known Problems Mother      No Known Problems Father       Social History     Tobacco Use    Smoking status: Never    Smokeless tobacco: Never   Vaping Use    Vaping Use:  Never used   Substance Use Topics    Alcohol use: Never    Drug use: Never       Physical Exam   ED Triage Vitals   Temperature Heart Rate Respirations BP   04/04/24 0242 04/04/24 0240 04/04/24 0240 04/04/24 0240   36.6 °C (97.9 °F) 76 16 108/65      Pulse Ox Temp Source Heart Rate Source Patient Position   04/04/24 0240 04/04/24 0242 04/04/24 0240 04/04/24 0240   100 % Temporal Monitor Sitting      BP Location FiO2 (%)     04/04/24 0240 --     Right arm        Physical Exam  General:  Awake, alert, no acute distress.  Head: Normocephalic, Atraumatic  Neck: Supple, trachea midline, no stridor  Skin: Warm and dry, no rashes.  Examination dialysis catheter left upper chest this is clear with no drainage, erythema, swelling, or any other signs of infection.  Lungs: Clear to auscultation bilaterally no acute respiratory distress, speaking in full sentences without difficulty  CV: Regular Rate Rhythm with no obvious murmurs gallops rubs noted, no jugular venous distention, no pedal edema   Abdomen: Soft, nontender, nondistended, positive bowel sounds, no peritoneal signs  Neuro:  No gross focal neurologic deficits, NIH is 0  Musculoskeletal:  Full range of motion in all 4 extremities  Psychiatric:  Alert oriented x 3, Good insight into condition.  ED Course & MDM   ED Course as of 04/04/24 2240   Thu Apr 04, 2024 0517 My EKG interpretation:  Sinus rhythm 60 bpm normal axis CO interval 176 QTc 418 no ectopy or acute ischemic changes noted [KW]      ED Course User Index  [KW] Chencho Gomez DO         Diagnoses as of 04/04/24 2240   Hyperkalemia       Medical Decision Making  Patient was treated with Percocet, morphine for pain.  She just had dialysis yesterday and her potassium is 6.3.  She has no acute EKG changes.  She was ordered insulin, D50, sodium bicarb.  I contacted her nephrologist Dr. Pedersen who will stat dialyze her.  I discussed the findings with the patient at bedside.  Contacted the hospitalist for  admission.    35 minutes of total critical care time includes review of laboratory data, radiology results, discussion with consultants, and monitoring for potential decompensation.  Interventions performed as documented above.  Total care time is exclusive of any separately billable procedures    Procedure  Procedures     Chencho Gomez,   04/04/24 9630       Chencho Gomez,   04/04/24 1472

## 2024-04-04 NOTE — NURSING NOTE
Pt A&O x3 arrived to floor from dialysis.  Complaints of generalized pain or discomfort noted.  Pt orientated to room, call light, and phone. Call light within reach.

## 2024-04-04 NOTE — PRE-PROCEDURE NOTE
Report from Sending RN:    Report From: Demetria Hector RN  Recent Surgery of Procedure: No  Baseline Level of Consciousness (LOC): A&Ox4  Oxygen Use: No  Type: n/a  Diabetic: No  Last BP Med Given Day of Dialysis: see EMAR  Last Pain Med Given: see EMAR, percocet and morphine  Lab Tests to be Obtained with Dialysis: Blood cultures x1 set  Blood Transfusion to be Given During Dialysis: No  Available IV Access: Yes  Medications to be Administered During Dialysis: No  Continuous IV Infusion Running: No  Restraints on Currently or in the Last 24 Hours: No  Hand-Off Communication: Pt c/o weakness, odor at catheter site, blood cultures already drawn, K of 6.3 but received K cocktail and stable vitals. Ok for stat treatment in dialysis room per Dr. LIBIA Pedersen  Dialysis Catheter Dressing: will assess when patient arrives  Last Dressing Change: will assess when patient arrives

## 2024-04-04 NOTE — ED NOTES
Pt presents through triage with c/o generalized weakness and pain. Pt states this started several days ago. Pt is on hemodialysis and states she noticed a bad odor coming from her port approx 2 days ago.      Xochitl Mir RN  04/04/24 0241

## 2024-04-04 NOTE — CONSULTS
"INFECTIOUS DISEASES CONSULTATION NOTE      Referred by Pamela Saldana MD    Reason For Consult  Rule out infected dialysis catheter    History Of Present Illness  Batsheva Page is a 48 y.o. female presenting with generalized malaise of several days duration    I have been closely involved in her care with recurrent episodes of MRSA septicemia.  She has chronic renal failure, previously had life-threatening peritonitis when she was on CAPD, has no available site for placement of an AV fistula, and has had recurrent episodes of MRSA bacteremia associated with infected hemodialysis catheters.  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  Since that time I been involved in her care twice for recurrent MRSA bacteremia, June 2023 in September 2023 in this hospital.  During the September 2023 admission, Dr. Skaggs performed a LAURA which demonstrated a new vegetation on the mitral valve.  Patient categorically refused any consideration for repeat cardiac valve surgery.  We are able to achieve bloodstream sterility with a combination of vancomycin and rifampin.  She tolerated rifampin after an initial period of pruritus, without any elevation of liver enzymes.  She completed 8 weeks of that regimen and the plan was for her to follow-up with me, and to consider a long-term suppressive prophylactic regimen, perhaps a \"vancomycin \"lock.\"  For various logistical reasons regarding her transportation, she was never able to keep several scheduled follow-up appointments with me, and she stopped taking rifampin.    She was admitted here again in January 2024 with another episode of sustained MRSA bacteremia and the dialysis catheter infection.  TTE showed a mobile density at the base of the anterior leaflet of the mitral valve, decreased in size compared with the September 2023 study and noting a small perforation of " "this leaflet but with preserved valve integrity.  No aortic valve vegetation.  Patient was not felt to be a candidate for valvular surgery, bloodstream sterility was achieved, and we had a lengthy conversation about initiating peritoneal dialysis.  The patient was reluctant and ultimately a new dialysis catheter was inserted over a wire in the left chest and she was discharged with the plan that she would begin peritoneal dialysis.  However, she is reluctant to pursue peritoneal dialysis and therefore, when she finished her course of vancomycin and rifampin, she was switched to a regimen of a \"vancomycin lock\" at the end of every dialysis session.  She has been doing that without difficulty for the last several weeks.    She underwent uncomplicated hemodialysis on schedule yesterday and 3 days ago.  No fever.  Her only complaint is a malodor from the dialysis catheter which she thinks might be related to the vancomycin lock.  However, she developed generalized myalgia and nausea and fatigue without fever and this has been going on for the last several days and she was directed to the emergency room.  She presented there early this morning and had no fever or leukocytosis or obvious drainage or malposition of the dialysis catheter, but because of her complex history and the recurrent episodes of catheter infection, she was admitted and started on broad-spectrum antibiotics following the collection of cultures.     Past Medical History  She has a past medical history of Aortic valve replaced (2022), CHF (congestive heart failure) (CMS/[facility]), Chronic pain, Coronary artery disease, Disease of thyroid gland, ESRD (end stage renal disease) (CMS/[facility]), H/O mitral valve replacement (2013), Heart disease, History of transfusion, Hypertension, Mitral valve regurgitation, Seizures (CMS/[facility]), and Stroke (CMS/[facility]).  Aortic and mitral valve replacements  Coronary artery disease   History of seizures  Chronic renal failure   "   Social History  Does not abuse tobacco or alcohol    Family History  Not pertinent to the current question    Allergies  Kayexalate, Metoclopramide hcl, Prochlorperazine, Zofran [ondansetron hcl], and Ondansetron     Review of Systems  Detailed review of systems completed.  No significant additional positives beyond what is mentioned above    Physical Exam  Vital signs:  Visit Vitals  /65 (BP Location: Right arm, Patient Position: Sitting)   Pulse 76   Temp 35.7 °C (96.3 °F) (Temporal)   Resp 16   Seen and examined in the hemodialysis unit  General: Awake, alert, not toxic  HEENT:  No scleral icterus or conjunctival suffusion, oral mucosa moist  Nodes:  Negative  Chest: Lungs clear to auscultation.  Tunneled dialysis catheter in the left chest without erythema or drainage or tenderness  Breasts:  Not examined  Heart:  S1, S2 normal, soft rumbling systolic ejection murmur.  Sharp prosthetic valve sounds.  No diastolic murmur  Abdomen:  Soft, nontender. No palpable organs or masses  Back:  No spinal or CVA tenderness  Genitalia:  Not examined  Extremities:  No cords, phlebitis, cellulitis  Neurologic:  Alert.  Grossly non-focal.  No meningismus  Skin: No mucocutaneous signs of infectious endocarditis    Relevant Results  Results from last 72 hours   Lab Units 04/04/24  0417   WBC AUTO x10*3/uL 6.8   HEMOGLOBIN g/dL 9.2*   HEMATOCRIT % 31.0*   PLATELETS AUTO x10*3/uL 207   NEUTROS PCT AUTO % 67.9   LYMPHS PCT AUTO % 18.1   MONOS PCT AUTO % 7.4   EOS PCT AUTO % 5.6     Results from last 72 hours   Lab Units 04/04/24  0417   CREATININE mg/dL 6.10*   ANION GAP mmol/L 13   EGFR mL/min/1.73m*2 8*     Results from last 72 hours   Lab Units 04/04/24  0417   AST U/L 14   ALT U/L 6   ALK PHOS U/L 67   BILIRUBIN TOTAL mg/dL 0.3     Microbiology:  Blood (4/4): 1 peripheral, 1 drawn on hemodialysis, pending  Imaging:        ASSESSMENT:  Rule out recurrent dialysis catheter infection  The patient is afebrile with a normal  white blood cell count, and an unremarkable examination of her dialysis catheter, and without any physical signs of infectious endocarditis.  Nonetheless, whenever she has systemic symptoms there must be a very high degree of suspicion for recurrent dialysis catheter infection and/or infectious endocarditis.  Cultures are pending      PLANS:  -   Will maintain therapeutic vancomycin and Zosyn pending further observation and incubation of blood cultures     THANK YOU FOR ASKING ME TO ASSIST YOU IN THE CARE OF YOUR PATIENT    Adama Soto MD  [facility]  Office:  [phone]

## 2024-04-05 ENCOUNTER — APPOINTMENT (OUTPATIENT)
Dept: DIALYSIS | Facility: HOSPITAL | Age: 49
End: 2024-04-05
Payer: MEDICARE

## 2024-04-05 LAB
ANION GAP SERPL CALC-SCNC: 14 MMOL/L
BUN SERPL-MCNC: 25 MG/DL (ref 8–25)
CALCIUM SERPL-MCNC: 7.4 MG/DL (ref 8.5–10.4)
CHLORIDE SERPL-SCNC: 93 MMOL/L (ref 97–107)
CO2 SERPL-SCNC: 26 MMOL/L (ref 24–31)
CREAT SERPL-MCNC: 4.9 MG/DL (ref 0.4–1.6)
EGFRCR SERPLBLD CKD-EPI 2021: 10 ML/MIN/1.73M*2
ERYTHROCYTE [DISTWIDTH] IN BLOOD BY AUTOMATED COUNT: 22.5 % (ref 11.5–14.5)
GLUCOSE SERPL-MCNC: 109 MG/DL (ref 65–99)
HCT VFR BLD AUTO: 28.8 % (ref 36–46)
HGB BLD-MCNC: 8.3 G/DL (ref 12–16)
MCH RBC QN AUTO: 26.3 PG (ref 26–34)
MCHC RBC AUTO-ENTMCNC: 28.8 G/DL (ref 32–36)
MCV RBC AUTO: 91 FL (ref 80–100)
NRBC BLD-RTO: 0 /100 WBCS (ref 0–0)
PLATELET # BLD AUTO: 193 X10*3/UL (ref 150–450)
POTASSIUM SERPL-SCNC: 6.3 MMOL/L (ref 3.4–5.1)
RBC # BLD AUTO: 3.16 X10*6/UL (ref 4–5.2)
SODIUM SERPL-SCNC: 133 MMOL/L (ref 133–145)
WBC # BLD AUTO: 6.3 X10*3/UL (ref 4.4–11.3)

## 2024-04-05 PROCEDURE — 2500000001 HC RX 250 WO HCPCS SELF ADMINISTERED DRUGS (ALT 637 FOR MEDICARE OP): Performed by: INTERNAL MEDICINE

## 2024-04-05 PROCEDURE — 2500000004 HC RX 250 GENERAL PHARMACY W/ HCPCS (ALT 636 FOR OP/ED): Performed by: HOSPITALIST

## 2024-04-05 PROCEDURE — 6350000001 HC RX 635 EPOETIN >10,000 UNITS: Mod: JZ | Performed by: INTERNAL MEDICINE

## 2024-04-05 PROCEDURE — 2500000004 HC RX 250 GENERAL PHARMACY W/ HCPCS (ALT 636 FOR OP/ED): Performed by: INTERNAL MEDICINE

## 2024-04-05 PROCEDURE — 8010000001 HC DIALYSIS - HEMODIALYSIS PER DAY

## 2024-04-05 PROCEDURE — 80048 BASIC METABOLIC PNL TOTAL CA: CPT | Performed by: INTERNAL MEDICINE

## 2024-04-05 PROCEDURE — 85027 COMPLETE CBC AUTOMATED: CPT | Performed by: INTERNAL MEDICINE

## 2024-04-05 PROCEDURE — 36415 COLL VENOUS BLD VENIPUNCTURE: CPT | Performed by: INTERNAL MEDICINE

## 2024-04-05 PROCEDURE — 2060000001 HC INTERMEDIATE ICU ROOM DAILY

## 2024-04-05 RX ORDER — DIPHENHYDRAMINE HCL 50 MG
50 CAPSULE ORAL ONCE
Status: DISCONTINUED | OUTPATIENT
Start: 2024-04-05 | End: 2024-04-06 | Stop reason: HOSPADM

## 2024-04-05 RX ORDER — LEVETIRACETAM 500 MG/1
500 TABLET ORAL NIGHTLY
Status: DISCONTINUED | OUTPATIENT
Start: 2024-04-06 | End: 2024-04-05

## 2024-04-05 RX ORDER — LEVETIRACETAM 500 MG/1
500 TABLET ORAL NIGHTLY
Status: DISCONTINUED | OUTPATIENT
Start: 2024-04-06 | End: 2024-04-06 | Stop reason: HOSPADM

## 2024-04-05 RX ORDER — DIPHENHYDRAMINE HYDROCHLORIDE 50 MG/ML
25 INJECTION INTRAMUSCULAR; INTRAVENOUS ONCE
Status: COMPLETED | OUTPATIENT
Start: 2024-04-05 | End: 2024-04-05

## 2024-04-05 RX ADMIN — DIPHENHYDRAMINE HYDROCHLORIDE 25 MG: 50 INJECTION, SOLUTION INTRAMUSCULAR; INTRAVENOUS at 03:00

## 2024-04-05 RX ADMIN — HEPARIN SODIUM 2100 UNITS: 1000 INJECTION INTRAVENOUS; SUBCUTANEOUS at 11:56

## 2024-04-05 RX ADMIN — HYDRALAZINE HYDROCHLORIDE 50 MG: 50 TABLET ORAL at 21:21

## 2024-04-05 RX ADMIN — PREGABALIN 25 MG: 25 CAPSULE ORAL at 21:36

## 2024-04-05 RX ADMIN — HYDRALAZINE HYDROCHLORIDE 50 MG: 50 TABLET ORAL at 14:34

## 2024-04-05 RX ADMIN — CALCITRIOL CAPSULES 0.25 MCG 0.5 MCG: 0.25 CAPSULE ORAL at 14:34

## 2024-04-05 RX ADMIN — DIPHENHYDRAMINE HYDROCHLORIDE 25 MG: 50 INJECTION, SOLUTION INTRAMUSCULAR; INTRAVENOUS at 07:55

## 2024-04-05 RX ADMIN — ATORVASTATIN CALCIUM 40 MG: 40 TABLET, FILM COATED ORAL at 14:34

## 2024-04-05 RX ADMIN — MORPHINE SULFATE 2 MG: 2 INJECTION, SOLUTION INTRAMUSCULAR; INTRAVENOUS at 21:19

## 2024-04-05 RX ADMIN — PIPERACILLIN SODIUM AND TAZOBACTAM SODIUM 2.25 G: 2; .25 INJECTION, SOLUTION INTRAVENOUS at 07:36

## 2024-04-05 RX ADMIN — DIPHENHYDRAMINE HYDROCHLORIDE 25 MG: 50 INJECTION, SOLUTION INTRAMUSCULAR; INTRAVENOUS at 21:20

## 2024-04-05 RX ADMIN — MORPHINE SULFATE 2 MG: 2 INJECTION, SOLUTION INTRAMUSCULAR; INTRAVENOUS at 16:56

## 2024-04-05 RX ADMIN — MORPHINE SULFATE 2 MG: 2 INJECTION, SOLUTION INTRAMUSCULAR; INTRAVENOUS at 01:24

## 2024-04-05 RX ADMIN — BUPROPION HYDROCHLORIDE 100 MG: 100 TABLET, FILM COATED ORAL at 14:33

## 2024-04-05 RX ADMIN — CLONIDINE HYDROCHLORIDE 0.1 MG: 0.1 TABLET ORAL at 05:52

## 2024-04-05 RX ADMIN — EPOETIN ALFA-EPBX 10000 UNITS: 10000 INJECTION, SOLUTION INTRAVENOUS; SUBCUTANEOUS at 14:32

## 2024-04-05 RX ADMIN — MORPHINE SULFATE 2 MG: 2 INJECTION, SOLUTION INTRAMUSCULAR; INTRAVENOUS at 05:52

## 2024-04-05 RX ADMIN — OXYCODONE AND ACETAMINOPHEN 1 TABLET: 5; 325 TABLET ORAL at 14:34

## 2024-04-05 RX ADMIN — PREGABALIN 25 MG: 25 CAPSULE ORAL at 14:33

## 2024-04-05 RX ADMIN — CLONIDINE HYDROCHLORIDE 0.1 MG: 0.1 TABLET ORAL at 21:21

## 2024-04-05 RX ADMIN — LEVETIRACETAM 500 MG: 500 TABLET, FILM COATED ORAL at 22:24

## 2024-04-05 RX ADMIN — CLONIDINE HYDROCHLORIDE 0.1 MG: 0.1 TABLET ORAL at 14:33

## 2024-04-05 RX ADMIN — DIPHENHYDRAMINE HYDROCHLORIDE 25 MG: 50 INJECTION, SOLUTION INTRAMUSCULAR; INTRAVENOUS at 14:32

## 2024-04-05 RX ADMIN — METOPROLOL SUCCINATE 50 MG: 50 TABLET, EXTENDED RELEASE ORAL at 21:21

## 2024-04-05 RX ADMIN — ASPIRIN 81 MG: 81 TABLET, COATED ORAL at 14:33

## 2024-04-05 RX ADMIN — MORPHINE SULFATE 2 MG: 2 INJECTION, SOLUTION INTRAMUSCULAR; INTRAVENOUS at 10:57

## 2024-04-05 ASSESSMENT — PAIN SCALES - GENERAL
PAINLEVEL_OUTOF10: 10 - WORST POSSIBLE PAIN
PAINLEVEL_OUTOF10: 7
PAINLEVEL_OUTOF10: 5 - MODERATE PAIN
PAINLEVEL_OUTOF10: 10 - WORST POSSIBLE PAIN
PAINLEVEL_OUTOF10: 0 - NO PAIN
PAINLEVEL_OUTOF10: 7
PAINLEVEL_OUTOF10: 0 - NO PAIN
PAINLEVEL_OUTOF10: 10 - WORST POSSIBLE PAIN

## 2024-04-05 ASSESSMENT — PAIN DESCRIPTION - DESCRIPTORS
DESCRIPTORS: BURNING;THROBBING
DESCRIPTORS: BURNING

## 2024-04-05 ASSESSMENT — PAIN DESCRIPTION - LOCATION
LOCATION: LEG
LOCATION: GENERALIZED
LOCATION: GENERALIZED

## 2024-04-05 NOTE — NURSING NOTE
Assume care of patient. Pt lying quietly in bed watching tv. Visitor at bedside. Pt states she has no needs at this time. Call light within reach. Continue with plan of care.

## 2024-04-05 NOTE — NURSING NOTE
Pt is back from dialysis. A&Ox4. Administered her morning meds that she had missed, along with medications that are due around this time. Pt stated she was starting to feel itchy and reports 10/10 generalized pain - medicated per orders.   Pt is requesting to talk about pain management options for when she is able to be discharged.  Pt denies any other needs at the moment. Belongings and call light within reach. Will be continuing to monitor

## 2024-04-05 NOTE — PROGRESS NOTES
"INFECTIOUS DISEASES PROGRESS NOTE    Consulted / following patient for:  Rule out recurrent dialysis catheter sepsis    Subjective   Interval History:   Still feels poorly in a nonspecific way, improving  Still complains of malodor which she thinks is coming from the region of her left chest tunneled dialysis catheter.  No tenderness or drainage there     Objective   PHYSICAL EXAMINATION  Vital signs:  Visit Vitals  BP (!) 108/48 (BP Location: Right leg, Patient Position: Lying) Comment (BP Location): thigh   Pulse 62   Temp 36.9 °C (98.4 °F) (Temporal)   Resp 16      General: Nontoxic, no distress  Lungs:  Clear to auscultation  Chest: Tunneled dialysis catheter left chest without erythema, suppuration, tenderness  Heart:  S1, S2 normal, soft rumbling systolic ejection murmur.  Sharp prosthetic valve sounds.  No diastolic murmur  Abdomen:  Soft, nontender. No palpable organs or masses  Extremities:  No cords, phlebitis, cellulitis  Neurologic:  Alert.  Grossly non-focal.  Skin: No mucocutaneous signs of infectious endocarditis    Relevant Results  WBC: 6300     Microbiology:  Blood (4/4: Negative X3, 2 peripheral and 1 through dialysis catheter    Imaging:        ASSESSMENT:  Rule out recurrent dialysis catheter infection  The patient is afebrile with a normal white blood cell count, and an unremarkable examination of her dialysis catheter, and without any physical signs of infectious endocarditis.  Nonetheless, whenever she has systemic symptoms there must be a very high degree of suspicion for recurrent dialysis catheter infection and/or infectious endocarditis.  Cultures are pending        PLANS:  -   Stop systemic antibiotics and monitor progress  -   Resume \"vancomycin lock\" with next hemodialysis session  -   Anticipate discharge soon    Discussed with Dr. LIBIA Pedersen  My colleagues will cover 4/6-4/14, and will see prn your call    Adama Soto MD  [facility]  Office:  [phone]  "

## 2024-04-05 NOTE — CARE PLAN
The patient's goals for the shift include feel better    The clinical goals for the shift include pain control    Over the shift, the patient did not make progress toward the following goals. Barriers to progression include chronic pain. Recommendations to address these barriers include administer medications/interventions as ordered.

## 2024-04-05 NOTE — NURSING NOTE
Assumed care of pt. Pt is scheduled for dialysis today. Denies any needs at the moment. Will continue to monitor

## 2024-04-05 NOTE — CARE PLAN
The patient's goals for the shift include feel better    The clinical goals for the shift include Pain management, dialysis    Over the shift, the patient did make progress toward the following goals. Pt went to dialysis today. Pt stated she wanted to talk about pain management options for when she is discharged to hopefully find a solution to keep the chronic pain level minimal

## 2024-04-05 NOTE — PRE-PROCEDURE NOTE
Report from Sending RN:    Report From: Chelle Rodriguez RN  Recent Surgery of Procedure: No  Baseline Level of Consciousness (LOC): A&Ox4  Oxygen Use: No  Type: n/a  Diabetic: No  Last BP Med Given Day of Dialysis: see EMAR  Last Pain Med Given: see EMAR  Lab Tests to be Obtained with Dialysis: Yes, pt refused morning labs, will be drawn with dialysis  Blood Transfusion to be Given During Dialysis: No  Available IV Access: Yes  Medications to be Administered During Dialysis: No  Continuous IV Infusion Running: No  Restraints on Currently or in the Last 24 Hours: No  Hand-Off Communication: Stable and ready for dialysis in HD room  Dialysis Catheter Dressing: will assess when patient arrives  Last Dressing Change: will assess when patient arrives

## 2024-04-05 NOTE — POST-PROCEDURE NOTE
Report to Receiving RN:    Report To: Chelle Rodriguez RN  Time Report Called: 1200  Hand-Off Communication: Dialysis complete. Last /55 HR 62. Removed 30cc. Catheter ports dwelled with 2.1cc heparin in each port. Capped securely with red curos caps.   Complications During Treatment: Yes, intradialytic hypotension, resolved  Ultrafiltration Treatment: No  Medications Administered During Dialysis: Yes, morphine given by FARAZ Chadwick  Blood Products Administered During Dialysis: No  Labs Sent During Dialysis: Yes  Heparin Drip Rate Changes: No  Dialysis Catheter Dressing: dry and intact  Last Dressing Change: changed today; dated, timed, initialed    Electronic Signatures:  Heidy HANNA

## 2024-04-05 NOTE — PROGRESS NOTES
Batsheva Page is a 48 y.o. female on day 1 of admission presenting with Hyperkalemia.      Subjective   No active complaints.  No fever or chills.       Objective     Last Recorded Vitals  BP (!) 108/48 (BP Location: Right leg, Patient Position: Lying) Comment (BP Location): thigh  Pulse 70   Temp 36.5 °C (97.7 °F) (Temporal)   Resp 16   Wt 69.8 kg (153 lb 14.1 oz)   SpO2 100%   Intake/Output last 3 Shifts:    Intake/Output Summary (Last 24 hours) at 4/5/2024 1206  Last data filed at 4/5/2024 1154  Gross per 24 hour   Intake 1100 ml   Output --   Net 1100 ml       Admission Weight  Weight: 61.7 kg (136 lb) (04/04/24 0240)    Daily Weight  04/05/24 : 69.8 kg (153 lb 14.1 oz)    Image Results      Physical Exam  Seen during hemodialysis.  Patient is stable, in no apparent distress.  Lungs are clear, diminished bilaterally.  Heart:: Regular S1-S2 with systolic murmur.  Abdomen is soft.  Bowel sounds positive.  Extremities: No peripheral edema, cords, cyanosis.  Face is symmetrical, moves all extremities.  Relevant Results             Results for orders placed or performed during the hospital encounter of 04/04/24 (from the past 24 hour(s))   Basic Metabolic Panel   Result Value Ref Range    Glucose 109 (H) 65 - 99 mg/dL    Sodium 133 133 - 145 mmol/L    Potassium 6.3 (HH) 3.4 - 5.1 mmol/L    Chloride 93 (L) 97 - 107 mmol/L    Bicarbonate 26 24 - 31 mmol/L    Urea Nitrogen 25 8 - 25 mg/dL    Creatinine 4.90 (H) 0.40 - 1.60 mg/dL    eGFR 10 (L) >60 mL/min/1.73m*2    Calcium 7.4 (L) 8.5 - 10.4 mg/dL    Anion Gap 14 <=19 mmol/L   CBC   Result Value Ref Range    WBC 6.3 4.4 - 11.3 x10*3/uL    nRBC 0.0 0.0 - 0.0 /100 WBCs    RBC 3.16 (L) 4.00 - 5.20 x10*6/uL    Hemoglobin 8.3 (L) 12.0 - 16.0 g/dL    Hematocrit 28.8 (L) 36.0 - 46.0 %    MCV 91 80 - 100 fL    MCH 26.3 26.0 - 34.0 pg    MCHC 28.8 (L) 32.0 - 36.0 g/dL    RDW 22.5 (H) 11.5 - 14.5 %    Platelets 193 150 - 450 x10*3/uL      Assessment/Plan                   Principal Problem:    Hyperkalemia    Hyperkalemia.  Patient has end-stage renal disease she has tendency to hyperkalemia.  She was dialyzed yesterday and potassium was 6.3 this morning, dialysis today.  Nephrology follows  History of recurrent MRSA infections in the bloodstream and infected cath in the past.  So far, blood cultures are negative.  Continue wide spectrum antibiotics for now.  ID follows  Hypertension, continue medications  DVT prophylaxis, heparin              Pamela Sadlana MD

## 2024-04-05 NOTE — PROGRESS NOTES
"Batsheva Page is a 48 y.o. female on day 1 of admission presenting with Hyperkalemia.    Subjective   Patient was admitted yesterday with hyperkalemia end-stage kidney disease generalized weakness she is seen and examined on dialysis therapy she feels little bit better no fever no chills blood cultures so far negative       Objective     Physical Exam  Constitutional:       General: She is not in acute distress.     Appearance: Normal appearance. She is not toxic-appearing.   Neck:      Vascular: No carotid bruit.   Cardiovascular:      Heart sounds: No murmur heard.     No friction rub. No gallop.   Pulmonary:      Breath sounds: No wheezing, rhonchi or rales.   Abdominal:      Tenderness: There is no abdominal tenderness. There is no right CVA tenderness, left CVA tenderness or rebound.   Musculoskeletal:         General: No tenderness.      Cervical back: Neck supple.      Right lower leg: No edema.      Left lower leg: No edema.   Lymphadenopathy:      Cervical: No cervical adenopathy.         Last Recorded Vitals  Blood pressure 112/58, pulse 71, temperature 37.1 °C (98.8 °F), temperature source Temporal, resp. rate 16, height 1.727 m (5' 8\"), weight 69.8 kg (153 lb 14.1 oz), SpO2 99 %.    Intake/Output last 3 Shifts:  I/O last 3 completed shifts:  In: 1700 (24.4 mL/kg) [P.O.:200; I.V.:1000 (14.3 mL/kg); Other:400; IV Piggyback:100]  Out: 3028 (43.4 mL/kg) [Other:3028]  Weight: 69.8 kg     Current Facility-Administered Medications:     acetaminophen (Tylenol) tablet 650 mg, 650 mg, oral, q6h PRN, Pamela Saldana MD    aspirin EC tablet 81 mg, 81 mg, oral, Daily, Pamela Saldana MD, 81 mg at 04/05/24 1433    atorvastatin (Lipitor) tablet 40 mg, 40 mg, oral, Daily, Pamela Saldana MD, 40 mg at 04/05/24 1434    buPROPion (Wellbutrin) tablet 100 mg, 100 mg, oral, Every other day, Pamela Saldana MD, 100 mg at 04/05/24 1433    calcitriol (Rocaltrol) capsule 0.5 mcg, 0.5 mcg, oral, Daily, " Pamela Saldana MD, 0.5 mcg at 04/05/24 1434    cloNIDine (Catapres) tablet 0.1 mg, 0.1 mg, oral, q8h KIMBERLY, Pamela Saldana MD, 0.1 mg at 04/05/24 1433    diphenhydrAMINE (BENADryl) capsule 50 mg, 50 mg, oral, Once, Pamela Saldana MD    diphenhydrAMINE (BENADryl) injection 25 mg, 25 mg, intravenous, q6h, Kinza Weiner DO, 25 mg at 04/05/24 1432    epoetin brandy-epbx (Retacrit) injection 10,000 Units, 10,000 Units, subcutaneous, Once per day on Mon Wed Fri, Zhou Pedersen MD, 10,000 Units at 04/05/24 1432    [START ON 4/10/2024] ergocalciferol (Vitamin D-2) capsule 1,250 mcg, 1,250 mcg, oral, Weekly, Pamela Saldana MD    guaiFENesin (Robitussin) 100 mg/5 mL syrup 200 mg, 200 mg, oral, q4h PRN, Pamela Saldana MD    heparin (porcine) injection 5,000 Units, 5,000 Units, subcutaneous, q12h, Pamela Saldana MD    heparin 1,000 unit/mL injection 1,000 Units, 1,000 Units, intra-catheter, After Dialysis, Zhou Pedersen MD, 2,100 Units at 04/05/24 1156    heparin 1,000 unit/mL injection 1,000 Units, 1,000 Units, intra-catheter, After Dialysis, Zhou Pedersen MD, 2,100 Units at 04/05/24 1156    hydrALAZINE (Apresoline) tablet 50 mg, 50 mg, oral, TID, Pamela Saldana MD, 50 mg at 04/05/24 1434    levETIRAcetam (Keppra) tablet 500 mg, 500 mg, oral, Nightly, Pamela Saldana MD, 500 mg at 04/04/24 2108    melatonin tablet 5 mg, 5 mg, oral, Nightly PRN, Pamela Saldana MD    metoprolol succinate XL (Toprol-XL) 24 hr tablet 50 mg, 50 mg, oral, BID, Pamela Saldana MD, 50 mg at 04/04/24 2108    morphine injection 2 mg, 2 mg, intravenous, q4h PRN, Pamela Saldana MD, 2 mg at 04/05/24 1057    ondansetron (Zofran) injection 4 mg, 4 mg, intravenous, q6h PRN, Pamela Saldana MD    ondansetron ODT (Zofran-ODT) disintegrating tablet 4 mg, 4 mg, oral, 4x daily PRN, Pamela Saldana MD    oxyCODONE-acetaminophen (Percocet) 5-325 mg per tablet 1 tablet, 1 tablet, oral, q6h PRN, Pamela  MD Shana, 1 tablet at 04/05/24 1434    piperacillin-tazobactam-dextrose (Zosyn) IV 2.25 g, 2.25 g, intravenous, Once, Regla Spaulding DO    pregabalin (Lyrica) capsule 25 mg, 25 mg, oral, BID, Pamela Saldana MD, 25 mg at 04/05/24 1433    sodium chloride 0.9 % bolus 200 mL, 200 mL, intravenous, q1h PRN, Vu Pedersen MD    sodium zirconium cyclosilicate (Lokelma) packet 10 g, 10 g, oral, Daily, Pamela Saldana MD   Relevant Results    Results for orders placed or performed during the hospital encounter of 04/04/24 (from the past 96 hour(s))   CBC and Auto Differential   Result Value Ref Range    WBC 6.8 4.4 - 11.3 x10*3/uL    nRBC 0.0 0.0 - 0.0 /100 WBCs    RBC 3.43 (L) 4.00 - 5.20 x10*6/uL    Hemoglobin 9.2 (L) 12.0 - 16.0 g/dL    Hematocrit 31.0 (L) 36.0 - 46.0 %    MCV 90 80 - 100 fL    MCH 26.8 26.0 - 34.0 pg    MCHC 29.7 (L) 32.0 - 36.0 g/dL    RDW 22.7 (H) 11.5 - 14.5 %    Platelets 207 150 - 450 x10*3/uL    Neutrophils % 67.9 40.0 - 80.0 %    Immature Granulocytes %, Automated 0.1 0.0 - 0.9 %    Lymphocytes % 18.1 13.0 - 44.0 %    Monocytes % 7.4 2.0 - 10.0 %    Eosinophils % 5.6 0.0 - 6.0 %    Basophils % 0.9 0.0 - 2.0 %    Neutrophils Absolute 4.58 1.20 - 7.70 x10*3/uL    Immature Granulocytes Absolute, Automated 0.01 0.00 - 0.70 x10*3/uL    Lymphocytes Absolute 1.22 1.20 - 4.80 x10*3/uL    Monocytes Absolute 0.50 0.10 - 1.00 x10*3/uL    Eosinophils Absolute 0.38 0.00 - 0.70 x10*3/uL    Basophils Absolute 0.06 0.00 - 0.10 x10*3/uL   Comprehensive metabolic panel   Result Value Ref Range    Glucose 102 (H) 65 - 99 mg/dL    Sodium 131 (L) 133 - 145 mmol/L    Potassium 6.3 (HH) 3.4 - 5.1 mmol/L    Chloride 89 (L) 97 - 107 mmol/L    Bicarbonate 29 24 - 31 mmol/L    Urea Nitrogen 26 (H) 8 - 25 mg/dL    Creatinine 6.10 (H) 0.40 - 1.60 mg/dL    eGFR 8 (L) >60 mL/min/1.73m*2    Calcium 8.6 8.5 - 10.4 mg/dL    Albumin 4.1 3.5 - 5.0 g/dL    Alkaline Phosphatase 67 35 - 125 U/L    Total Protein 8.2 (H)  5.9 - 7.9 g/dL    AST 14 5 - 40 U/L    Bilirubin, Total 0.3 0.1 - 1.2 mg/dL    ALT 6 5 - 40 U/L    Anion Gap 13 <=19 mmol/L   Blood Gas Lactic Acid, Venous   Result Value Ref Range    POCT Lactate, Venous 1.2 0.4 - 2.0 mmol/L   Blood Culture    Specimen: Peripheral Venipuncture; Blood culture   Result Value Ref Range    Blood Culture No growth at 1 day    Blood Culture    Specimen: Peripheral Venipuncture; Blood culture   Result Value Ref Range    Blood Culture No growth at 1 day    Morphology   Result Value Ref Range    RBC Morphology See Below     Hypochromia Mild     Stomatocytes Few    Sars-CoV-2 and Influenza A/B PCR   Result Value Ref Range    Flu A Result Not Detected Not Detected    Flu B Result Not Detected Not Detected    Coronavirus 2019, PCR Not Detected Not Detected   ECG 12 lead   Result Value Ref Range    Ventricular Rate 60 BPM    Atrial Rate 60 BPM    FL Interval 176 ms    QRS Duration 68 ms    QT Interval 418 ms    QTC Calculation(Bazett) 418 ms    P Axis 110 degrees    R Axis 178 degrees    T Axis 130 degrees    QRS Count 10 beats    Q Onset 224 ms    P Onset 136 ms    P Offset 191 ms    T Offset 433 ms    QTC Fredericia 418 ms   POCT GLUCOSE   Result Value Ref Range    POCT Glucose 102 (H) 74 - 99 mg/dL   Blood Culture    Specimen: Central Line/Catheter (Specify below); Blood culture   Result Value Ref Range    Blood Culture No growth at 1 day    Basic Metabolic Panel   Result Value Ref Range    Glucose 109 (H) 65 - 99 mg/dL    Sodium 133 133 - 145 mmol/L    Potassium 6.3 (HH) 3.4 - 5.1 mmol/L    Chloride 93 (L) 97 - 107 mmol/L    Bicarbonate 26 24 - 31 mmol/L    Urea Nitrogen 25 8 - 25 mg/dL    Creatinine 4.90 (H) 0.40 - 1.60 mg/dL    eGFR 10 (L) >60 mL/min/1.73m*2    Calcium 7.4 (L) 8.5 - 10.4 mg/dL    Anion Gap 14 <=19 mmol/L   CBC   Result Value Ref Range    WBC 6.3 4.4 - 11.3 x10*3/uL    nRBC 0.0 0.0 - 0.0 /100 WBCs    RBC 3.16 (L) 4.00 - 5.20 x10*6/uL    Hemoglobin 8.3 (L) 12.0 - 16.0 g/dL     Hematocrit 28.8 (L) 36.0 - 46.0 %    MCV 91 80 - 100 fL    MCH 26.3 26.0 - 34.0 pg    MCHC 28.8 (L) 32.0 - 36.0 g/dL    RDW 22.5 (H) 11.5 - 14.5 %    Platelets 193 150 - 450 x10*3/uL       Assessment/Plan   End-stage kidney disease continue dialysis Monday Wednesday Friday  Hyperkalemia  Hypertension  Anemia of chronic kidney disease  Rule out infection which I doubt  Permacath           Zhou Pedersen MD

## 2024-04-05 NOTE — PROGRESS NOTES
Pt lives in a one story house with a friend, she is independent for mobility. Pts family provides transportation. Pt wears glasses. Pt receives dialysis at [facility] M,W,F at 5am. Pt states she has a hx of a little anxiety however states it is under control. Denies smoking or drinking alcohol. Pt states she utilizes PCP services through her nephrologist Dr Zhou Pedersen, prescriptions filled through [facility] in [place].      04/05/24 6866   Discharge Planning   Patient expects to be discharged to: Home

## 2024-04-06 VITALS
WEIGHT: 153.88 LBS | BODY MASS INDEX: 23.32 KG/M2 | HEART RATE: 54 BPM | HEIGHT: 68 IN | TEMPERATURE: 97.9 F | OXYGEN SATURATION: 100 % | RESPIRATION RATE: 16 BRPM | SYSTOLIC BLOOD PRESSURE: 126 MMHG | DIASTOLIC BLOOD PRESSURE: 71 MMHG

## 2024-04-06 PROBLEM — E87.5 HYPERKALEMIA: Status: RESOLVED | Noted: 2023-11-09 | Resolved: 2024-04-06

## 2024-04-06 LAB
ANION GAP SERPL CALC-SCNC: 13 MMOL/L
BUN SERPL-MCNC: 21 MG/DL (ref 8–25)
CALCIUM SERPL-MCNC: 8.2 MG/DL (ref 8.5–10.4)
CHLORIDE SERPL-SCNC: 95 MMOL/L (ref 97–107)
CO2 SERPL-SCNC: 28 MMOL/L (ref 24–31)
CREAT SERPL-MCNC: 3.9 MG/DL (ref 0.4–1.6)
EGFRCR SERPLBLD CKD-EPI 2021: 14 ML/MIN/1.73M*2
GLUCOSE SERPL-MCNC: 78 MG/DL (ref 65–99)
HOLD SPECIMEN: NORMAL
POTASSIUM SERPL-SCNC: 4.4 MMOL/L (ref 3.4–5.1)
SODIUM SERPL-SCNC: 136 MMOL/L (ref 133–145)

## 2024-04-06 PROCEDURE — 2500000004 HC RX 250 GENERAL PHARMACY W/ HCPCS (ALT 636 FOR OP/ED): Performed by: INTERNAL MEDICINE

## 2024-04-06 PROCEDURE — 2500000001 HC RX 250 WO HCPCS SELF ADMINISTERED DRUGS (ALT 637 FOR MEDICARE OP): Performed by: INTERNAL MEDICINE

## 2024-04-06 PROCEDURE — 36415 COLL VENOUS BLD VENIPUNCTURE: CPT | Performed by: INTERNAL MEDICINE

## 2024-04-06 PROCEDURE — 80048 BASIC METABOLIC PNL TOTAL CA: CPT | Performed by: INTERNAL MEDICINE

## 2024-04-06 PROCEDURE — 2500000004 HC RX 250 GENERAL PHARMACY W/ HCPCS (ALT 636 FOR OP/ED): Performed by: HOSPITALIST

## 2024-04-06 RX ORDER — PREGABALIN 25 MG/1
50 CAPSULE ORAL 2 TIMES DAILY
Qty: 120 CAPSULE | Refills: 0 | Status: SHIPPED | OUTPATIENT
Start: 2024-04-06 | End: 2024-05-06

## 2024-04-06 RX ADMIN — MORPHINE SULFATE 2 MG: 2 INJECTION, SOLUTION INTRAMUSCULAR; INTRAVENOUS at 05:41

## 2024-04-06 RX ADMIN — DIPHENHYDRAMINE HYDROCHLORIDE 25 MG: 50 INJECTION, SOLUTION INTRAMUSCULAR; INTRAVENOUS at 03:36

## 2024-04-06 RX ADMIN — DIPHENHYDRAMINE HYDROCHLORIDE 25 MG: 50 INJECTION, SOLUTION INTRAMUSCULAR; INTRAVENOUS at 09:57

## 2024-04-06 RX ADMIN — MORPHINE SULFATE 2 MG: 2 INJECTION, SOLUTION INTRAMUSCULAR; INTRAVENOUS at 10:07

## 2024-04-06 RX ADMIN — CLONIDINE HYDROCHLORIDE 0.1 MG: 0.1 TABLET ORAL at 05:41

## 2024-04-06 RX ADMIN — ASPIRIN 81 MG: 81 TABLET, COATED ORAL at 09:57

## 2024-04-06 RX ADMIN — ATORVASTATIN CALCIUM 40 MG: 40 TABLET, FILM COATED ORAL at 09:57

## 2024-04-06 RX ADMIN — HYDRALAZINE HYDROCHLORIDE 50 MG: 50 TABLET ORAL at 09:58

## 2024-04-06 RX ADMIN — CALCITRIOL CAPSULES 0.25 MCG 0.5 MCG: 0.25 CAPSULE ORAL at 09:58

## 2024-04-06 RX ADMIN — METOPROLOL SUCCINATE 50 MG: 50 TABLET, EXTENDED RELEASE ORAL at 09:58

## 2024-04-06 RX ADMIN — PREGABALIN 25 MG: 25 CAPSULE ORAL at 09:58

## 2024-04-06 RX ADMIN — MORPHINE SULFATE 2 MG: 2 INJECTION, SOLUTION INTRAMUSCULAR; INTRAVENOUS at 01:29

## 2024-04-06 ASSESSMENT — COGNITIVE AND FUNCTIONAL STATUS - GENERAL
MOBILITY SCORE: 24
DAILY ACTIVITIY SCORE: 24

## 2024-04-06 ASSESSMENT — PAIN SCALES - GENERAL
PAINLEVEL_OUTOF10: 5 - MODERATE PAIN
PAINLEVEL_OUTOF10: 10 - WORST POSSIBLE PAIN
PAINLEVEL_OUTOF10: 10 - WORST POSSIBLE PAIN
PAINLEVEL_OUTOF10: 7
PAINLEVEL_OUTOF10: 0 - NO PAIN
PAINLEVEL_OUTOF10: 10 - WORST POSSIBLE PAIN

## 2024-04-06 ASSESSMENT — PAIN DESCRIPTION - DESCRIPTORS
DESCRIPTORS: BURNING

## 2024-04-06 ASSESSMENT — PAIN DESCRIPTION - ORIENTATION: ORIENTATION: RIGHT;LEFT

## 2024-04-06 ASSESSMENT — PAIN DESCRIPTION - LOCATION: LOCATION: LEG

## 2024-04-06 NOTE — NURSING NOTE
Assumed care of pt at this time. Resting comfortably in bed, no signs of distress. Denies any needs right now

## 2024-04-06 NOTE — PROGRESS NOTES
ID Update    Patient remains afebrile overnight.  Blood cultures x 3 sets drawn on 4/4 remain sterile.  She is off all antibiotics.  Would be okay for discharge anytime from an ID standpoint as it does not appear to be any evidence of bloodstream infection this admission.    Esa Hi MD  [facility]  [phone]

## 2024-04-06 NOTE — CARE PLAN
The patient's goals for the shift include feel better    The clinical goals for the shift include manage pain    Over the shift, the patient did make progress toward the following goals. Barriers to progression include -. Recommendations to address these barriers include -.    Pt getting discharged today. Pain management consult will be dealt with in outpatient setting after dc.

## 2024-04-06 NOTE — DISCHARGE SUMMARY
Discharge Diagnosis  Hyperkalemia    Issues Requiring Follow-Up  Hyperkalemia  End-stage renal disease  Chronic pain syndrome    Discharge Meds     Your medication list        CHANGE how you take these medications        Instructions Last Dose Given Next Dose Due   pregabalin 25 mg capsule  Commonly known as: Lyrica  What changed:   how much to take  additional instructions      Take 2 capsules (50 mg) by mouth 2 times a day.              CONTINUE taking these medications        Instructions Last Dose Given Next Dose Due   acetaminophen 325 mg tablet  Commonly known as: Tylenol      Take 2 tablets (650 mg) by mouth every 6 hours as needed for mild pain (1 - 3).       aspirin 81 mg EC tablet      Take 1 tablet (81 mg) by mouth once daily.       atorvastatin 40 mg tablet  Commonly known as: Lipitor           buPROPion 100 mg tablet  Commonly known as: Wellbutrin      Take 1 tablet (100 mg) by mouth every other day.       calcitriol 0.25 mcg capsule  Commonly known as: Rocaltrol           cloNIDine 0.1 mg tablet  Commonly known as: Catapres      Take 1 tablet (0.1 mg) by mouth every 8 hours.       epoetin brandy-epbx 20,000 unit/mL solution  Commonly known as: RETACRIT      Inject 6,440 Units under the skin 3 times a week. Do not start before January 17, 2024.       ergocalciferol 1.25 MG (45525 UT) capsule  Commonly known as: Vitamin D-2           hydrALAZINE 50 mg tablet  Commonly known as: Apresoline           levETIRAcetam 500 mg tablet  Commonly known as: Keppra           Lokelma 10 gram packet  Generic drug: sodium zirconium cyclosilicate      DISSOLVE 1 PACKET INTO 45ML OF WATER TAKE DAILY BEFORE DINNER. ADMINSTER OTHER MEDICTIONS AT LEAST 2 HOURS BEFORE OR 2 HOURS AFTER LOKELMA       metoprolol succinate XL 50 mg 24 hr tablet  Commonly known as: Toprol-XL      TAKE 1 TABLET BY MOUTH TWICE DAILY       oxyCODONE-acetaminophen 5-325 mg tablet  Commonly known as: Percocet      Take 1 tablet by mouth every 6 hours as  needed for moderate pain (4 - 6).       promethazine 25 mg tablet  Commonly known as: Phenergan                     Where to Get Your Medications        You can get these medications from any pharmacy    Bring a paper prescription for each of these medications  pregabalin 25 mg capsule         Test Results Pending At Discharge  Pending Labs       Order Current Status    Blood Culture Preliminary result    Blood Culture Preliminary result    Blood Culture Preliminary result            Hospital Course   History:Batsheva Page is a 48 y.o. female presenting with malaise, generalized weakness, nausea.  Patient is an unfortunate 48-year-old white female well-known to our service.  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  She was treated with IV vancomycin and rifampin.  Patient had endocarditis of mitral and aortic valve.  Please see detailed note by Dr. Soto.  Currently, patient is being dialyzed through permacath located in the left upper chest.  Past, we had problems trying to find another vessel suitable for dialysis access.  Unfortunately we had to place permacath in the same location where she had infected catheter after he cultures were negative.   Patient presents with couple days of generalized weakness and malaise.  She is afebrile.  In the emergency department she had blood cultures drawn and she was started on vancomycin and Zosyn.  Patient was found to have potassium of 6.3 so she received medications for hyperkalemia and she already finished an emergent dialysis.    Patient was admitted to the hospital, she was dialyzed twice on April 4 and 5.  Today Potassium level is 4.4.  Patient is stable hemodynamically.  No evidence of volume overload.  With regards to history of MRSA septicemia and endocarditis in the past, patient was treated with broad-spectrum antibiotics.  She was consulted by Dr. BILL  Brittany.  3 sets of blood cultures remained negative.  Patient was afebrile with normal white count.  We discontinued antibiotics yesterday and patient remained stable.  Impression was that patient did not have any evidence of septicemia at this time and she can be safely discharged home without antibiotics.  They will continue doing vancomycin lock after each dialysis at [facility] center.  Patient addressed increased pain in his legs especially during dialysis.  She wants to follow with pain management.  I will increase Lyrica to 50 mg twice a day.  Total time spent with this patient today coordinating discharge including exam, discussion, dictation, paperwork 31 minutes.  Patient is being discharged home in stable condition.    Pertinent Physical Exam At Time of Discharge  Physical Exam  Pt is NAD.  Cooperative with exam.  In no distress at rest.  A, Ox3.  Face is symmetrical.  Skin - no lesions.  Lungs: clear to auscultations B/L. No wheezes, rales, rhonchi.  Heart: regular S1S2.  Abdomen: soft, NT, ND. BS positive.  Extr.: no edema, cords, cyanosis.  Moves all extr.   Outpatient Follow-Up  No future appointments.  PCP  Nephrologist  Pain management    Pamela Saldana MD

## 2024-04-06 NOTE — PROGRESS NOTES
Spiritual Care Visit    Clinical Encounter Type  Visited With: Patient  Routine Visit: Introduction  Continue Visiting: Yes         Values/Beliefs  Spiritual Requests During Hospitalization: Anointed today    Sacramental Encounters  Sacrament of Sick-Anointing: Anointed     David Peguero

## 2024-04-06 NOTE — CARE PLAN
The patient's goals for the shift include feel better    The clinical goals for the shift include pain management    Over the shift, the patient did not make progress toward the following goals. Barriers to progression include chronic pain. Recommendations to address these barriers include administer medications/interventions as ordered.

## 2024-04-08 LAB
BACTERIA BLD CULT: NORMAL

## 2024-04-27 ENCOUNTER — APPOINTMENT (OUTPATIENT)
Dept: RADIOLOGY | Facility: HOSPITAL | Age: 49
DRG: 314 | End: 2024-04-27
Payer: MEDICARE

## 2024-04-27 ENCOUNTER — HOSPITAL ENCOUNTER (INPATIENT)
Facility: HOSPITAL | Age: 49
LOS: 9 days | Discharge: HOME | DRG: 314 | End: 2024-05-07
Attending: EMERGENCY MEDICINE | Admitting: INTERNAL MEDICINE
Payer: MEDICARE

## 2024-04-27 DIAGNOSIS — A41.9 SEPSIS, DUE TO UNSPECIFIED ORGANISM, UNSPECIFIED WHETHER ACUTE ORGAN DYSFUNCTION PRESENT (MULTI): Primary | ICD-10-CM

## 2024-04-27 DIAGNOSIS — A49.02 MRSA (METHICILLIN RESISTANT STAPHYLOCOCCUS AUREUS) INFECTION: ICD-10-CM

## 2024-04-27 DIAGNOSIS — I33.0 CHRONIC BACTERIAL ENDOCARDITIS (HHS-HCC): ICD-10-CM

## 2024-04-27 DIAGNOSIS — B95.62 MRSA BACTEREMIA: ICD-10-CM

## 2024-04-27 DIAGNOSIS — I38 ENDOCARDITIS, VALVE UNSPECIFIED: ICD-10-CM

## 2024-04-27 DIAGNOSIS — R78.81 MRSA BACTEREMIA: ICD-10-CM

## 2024-04-27 DIAGNOSIS — I33.0 ACUTE BACTERIAL ENDOCARDITIS (HHS-HCC): ICD-10-CM

## 2024-04-27 DIAGNOSIS — F41.9 ANXIETY: ICD-10-CM

## 2024-04-27 DIAGNOSIS — I95.9 HYPOTENSION, UNSPECIFIED HYPOTENSION TYPE: ICD-10-CM

## 2024-04-27 DIAGNOSIS — A41.9 SEPSIS (MULTI): ICD-10-CM

## 2024-04-27 DIAGNOSIS — I48.0 PAROXYSMAL ATRIAL FIBRILLATION (MULTI): ICD-10-CM

## 2024-04-27 DIAGNOSIS — N18.6 END STAGE RENAL DISEASE ON DIALYSIS (MULTI): ICD-10-CM
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
## 2024-04-27 DIAGNOSIS — Z99.2 END STAGE RENAL DISEASE ON DIALYSIS (MULTI): ICD-10-CM

## 2024-04-27 DIAGNOSIS — J18.9 HOSPITAL-ACQUIRED PNEUMONIA: ICD-10-CM

## 2024-04-27 DIAGNOSIS — E87.5 HYPERKALEMIA: ICD-10-CM

## 2024-04-27 DIAGNOSIS — B33.21: ICD-10-CM

## 2024-04-27 LAB
ALBUMIN SERPL-MCNC: 4.1 G/DL (ref 3.5–5)
ALP BLD-CCNC: 100 U/L (ref 35–125)
ALT SERPL-CCNC: 5 U/L (ref 5–40)
ANION GAP SERPL CALC-SCNC: >19 MMOL/L
AST SERPL-CCNC: 17 U/L (ref 5–40)
BASOPHILS # BLD AUTO: 0.06 X10*3/UL (ref 0–0.1)
BASOPHILS NFR BLD AUTO: 0.3 %
BILIRUB SERPL-MCNC: 0.4 MG/DL (ref 0.1–1.2)
BUN SERPL-MCNC: 31 MG/DL (ref 8–25)
CALCIUM SERPL-MCNC: 8.5 MG/DL (ref 8.5–10.4)
CHLORIDE SERPL-SCNC: 83 MMOL/L (ref 97–107)
CO2 SERPL-SCNC: 24 MMOL/L (ref 24–31)
CREAT SERPL-MCNC: 7.7 MG/DL (ref 0.4–1.6)
EGFRCR SERPLBLD CKD-EPI 2021: 6 ML/MIN/1.73M*2
EOSINOPHIL # BLD AUTO: 0.08 X10*3/UL (ref 0–0.7)
EOSINOPHIL NFR BLD AUTO: 0.4 %
ERYTHROCYTE [DISTWIDTH] IN BLOOD BY AUTOMATED COUNT: 18.9 % (ref 11.5–14.5)
FLUAV RNA RESP QL NAA+PROBE: NOT DETECTED
FLUBV RNA RESP QL NAA+PROBE: NOT DETECTED
GLUCOSE SERPL-MCNC: 99 MG/DL (ref 65–99)
HCT VFR BLD AUTO: 32.4 % (ref 36–46)
HGB BLD-MCNC: 9.6 G/DL (ref 12–16)
IMM GRANULOCYTES # BLD AUTO: 0.13 X10*3/UL (ref 0–0.7)
IMM GRANULOCYTES NFR BLD AUTO: 0.6 % (ref 0–0.9)
LACTATE BLDV-SCNC: 2.2 MMOL/L (ref 0.4–2)
LACTATE BLDV-SCNC: 2.5 MMOL/L (ref 0.4–2)
LYMPHOCYTES # BLD AUTO: 0.66 X10*3/UL (ref 1.2–4.8)
LYMPHOCYTES NFR BLD AUTO: 3.1 %
MCH RBC QN AUTO: 25.8 PG (ref 26–34)
MCHC RBC AUTO-ENTMCNC: 29.6 G/DL (ref 32–36)
MCV RBC AUTO: 87 FL (ref 80–100)
MONOCYTES # BLD AUTO: 0.87 X10*3/UL (ref 0.1–1)
MONOCYTES NFR BLD AUTO: 4 %
NEUTROPHILS # BLD AUTO: 19.74 X10*3/UL (ref 1.2–7.7)
NEUTROPHILS NFR BLD AUTO: 91.6 %
NRBC BLD-RTO: 0 /100 WBCS (ref 0–0)
PLATELET # BLD AUTO: 253 X10*3/UL (ref 150–450)
POTASSIUM SERPL-SCNC: 6.6 MMOL/L (ref 3.4–5.1)
PROT SERPL-MCNC: 9 G/DL (ref 5.9–7.9)
RBC # BLD AUTO: 3.72 X10*6/UL (ref 4–5.2)
SARS-COV-2 RNA RESP QL NAA+PROBE: NOT DETECTED
SODIUM SERPL-SCNC: 128 MMOL/L (ref 133–145)
WBC # BLD AUTO: 21.5 X10*3/UL (ref 4.4–11.3)

## 2024-04-27 PROCEDURE — 36415 COLL VENOUS BLD VENIPUNCTURE: CPT | Performed by: PHYSICIAN ASSISTANT

## 2024-04-27 PROCEDURE — 83605 ASSAY OF LACTIC ACID: CPT | Performed by: PHYSICIAN ASSISTANT

## 2024-04-27 PROCEDURE — 2500000004 HC RX 250 GENERAL PHARMACY W/ HCPCS (ALT 636 FOR OP/ED): Performed by: EMERGENCY MEDICINE

## 2024-04-27 PROCEDURE — 87040 BLOOD CULTURE FOR BACTERIA: CPT | Mod: 91,WESLAB | Performed by: PHYSICIAN ASSISTANT

## 2024-04-27 PROCEDURE — 85025 COMPLETE CBC W/AUTO DIFF WBC: CPT | Performed by: PHYSICIAN ASSISTANT

## 2024-04-27 PROCEDURE — 80053 COMPREHEN METABOLIC PANEL: CPT | Performed by: PHYSICIAN ASSISTANT

## 2024-04-27 PROCEDURE — 87636 SARSCOV2 & INF A&B AMP PRB: CPT | Performed by: PHYSICIAN ASSISTANT

## 2024-04-27 PROCEDURE — 96374 THER/PROPH/DIAG INJ IV PUSH: CPT

## 2024-04-27 PROCEDURE — 71046 X-RAY EXAM CHEST 2 VIEWS: CPT | Performed by: RADIOLOGY

## 2024-04-27 PROCEDURE — 96375 TX/PRO/DX INJ NEW DRUG ADDON: CPT

## 2024-04-27 PROCEDURE — 99291 CRITICAL CARE FIRST HOUR: CPT | Performed by: EMERGENCY MEDICINE

## 2024-04-27 PROCEDURE — 71046 X-RAY EXAM CHEST 2 VIEWS: CPT

## 2024-04-27 RX ORDER — ACETAMINOPHEN 325 MG/1
975 TABLET ORAL ONCE
Status: COMPLETED | OUTPATIENT
Start: 2024-04-27 | End: 2024-04-28

## 2024-04-27 RX ORDER — VANCOMYCIN 1.5 G/300ML
1.5 INJECTION, SOLUTION INTRAVENOUS ONCE
Status: DISCONTINUED | OUTPATIENT
Start: 2024-04-27 | End: 2024-04-28

## 2024-04-27 RX ORDER — NOREPINEPHRINE BITARTRATE/D5W 8 MG/250ML
.01-.5 PLASTIC BAG, INJECTION (ML) INTRAVENOUS CONTINUOUS
Status: DISCONTINUED | OUTPATIENT
Start: 2024-04-28 | End: 2024-04-29

## 2024-04-27 RX ORDER — CALCIUM GLUCONATE 20 MG/ML
2 INJECTION, SOLUTION INTRAVENOUS ONCE
Status: COMPLETED | OUTPATIENT
Start: 2024-04-27 | End: 2024-04-28

## 2024-04-27 RX ADMIN — PIPERACILLIN SODIUM AND TAZOBACTAM SODIUM 4.5 G: 4; .5 INJECTION, SOLUTION INTRAVENOUS at 23:10

## 2024-04-27 RX ADMIN — CALCIUM GLUCONATE 2 G: 20 INJECTION, SOLUTION INTRAVENOUS at 23:36

## 2024-04-27 RX ADMIN — SODIUM CHLORIDE 250 ML: 9 INJECTION, SOLUTION INTRAVENOUS at 23:10

## 2024-04-27 ASSESSMENT — PAIN DESCRIPTION - PAIN TYPE: TYPE: ACUTE PAIN

## 2024-04-27 ASSESSMENT — PAIN DESCRIPTION - ONSET: ONSET: ONGOING

## 2024-04-27 ASSESSMENT — PAIN DESCRIPTION - PROGRESSION: CLINICAL_PROGRESSION: NOT CHANGED

## 2024-04-27 ASSESSMENT — PAIN SCALES - GENERAL: PAINLEVEL_OUTOF10: 8

## 2024-04-27 ASSESSMENT — PAIN - FUNCTIONAL ASSESSMENT: PAIN_FUNCTIONAL_ASSESSMENT: 0-10

## 2024-04-27 ASSESSMENT — PAIN DESCRIPTION - DESCRIPTORS: DESCRIPTORS: ACHING

## 2024-04-27 ASSESSMENT — PAIN DESCRIPTION - FREQUENCY: FREQUENCY: CONSTANT/CONTINUOUS

## 2024-04-27 ASSESSMENT — COLUMBIA-SUICIDE SEVERITY RATING SCALE - C-SSRS
6. HAVE YOU EVER DONE ANYTHING, STARTED TO DO ANYTHING, OR PREPARED TO DO ANYTHING TO END YOUR LIFE?: NO
2. HAVE YOU ACTUALLY HAD ANY THOUGHTS OF KILLING YOURSELF?: NO
1. IN THE PAST MONTH, HAVE YOU WISHED YOU WERE DEAD OR WISHED YOU COULD GO TO SLEEP AND NOT WAKE UP?: NO

## 2024-04-27 ASSESSMENT — PAIN DESCRIPTION - LOCATION: LOCATION: GENERALIZED

## 2024-04-27 NOTE — Clinical Note
Patient Clipped and Prepped: left chest and left neck. Prepped with Betadine and draped in sterile fashion.

## 2024-04-27 NOTE — Clinical Note
[facility]    Medicine Progress Note - Hospitalist Service       Date of Admission:  2/17/2021  Assessment & Plan       Jose Vazquez is a 72-year-old male with a medical history of recent stroke who presented to the emergency department on 2/17/2021 after he was found in the community down after a fall.    Fall, likely in the setting of physical deconditioning  Patient not able to give any significant collateral history.  Based on his weakness and PT eval's, likely this was due to physical deconditioning.  -TTE without any valvular abnormalities, telemetry NSR, laboratory work-up negative including TSH, CK, LFTs, troponin  - tele NSR, discontinue  - working with OT/PT    Concern for cognitive impairment vs Acute encephalopathy (post stroke vs TBI vs other)  Concern for vulnerable adult  Patient previously living independently prior to his recent stroke.  After that he was discharged to a TCU which he left AMA from.  He does not recall the events leading to his AMA discharge.  Although he is oriented at times, significant concerns related to his decision-making ability. Unclear cause for this, likely progressive dementia with recent acute stroke worsening the situation.   This is a difficult situation with patient deemed not decisional on intial evaluation by psych and no next of kin or other surrogate decision makers. Patient states he has a neighbor who helps him, but is clear he has never had a medical power of . If he needed a surrogate decision maker, he would like it to be his neighbor.  -OT for cognitive eval (SLUMS 6/30)  -Social work consult, they will file vulnerable adult  -Psych consult 2/19 felt he was not decisional   -Re-consult psych to assist with level of decision making ability. Patient remains adamant he would like to discharge home, but can't safely weigh risk benefit of this. As he has no next of kin, concern may have to pursue commitment. Patient is not  Universal procedure checklist and airway assessment completed. decisional. If he attempts to leave, he is holdable.     Addendum:  Dr Ramos Spoke with neighbor Trent Escobar at [phone] on 2/22/21 . He is the son of neighbor Barron. Jose usually goes over to Barron's house daily to chat and will often spend most of the day there. Trent visits his dad every day and is primary caretaker for him. Trent wonders if Jose could ever come to live with his dad in the future, but would like him to complete a stay at TCU first. Trent did not fully commit to this plan, but seemed open to it. Trent will call Jose on 2/23 to encourage him to work with therapies and accept TCU placement. Discussed with care coordinator. If psych believes him decisional, may be able to appoint Trent as decision maker for major decisions and then arrange discharge to TCU.      Reconsult to Psychiatry 2/2321 indicates per Psychiatry,  felt patient had capacity of self decision making.  However Psychiatry note indicates that patient elects neighbor Barron as who he would want to assist with medical decisions.  Clarified with  2/24/2021 that patient elects the neighbor's son Trent as healthcare appointee to assist with decisions.  During this encounter with  present patient clearly identified Trent as who he would want to assist with medical decisions.  In addition patient acknowledged and was receptive to discharge to TCU.   to proceed with documentations regarding these plans.  Further discussed with  who  discussed execution of Patient's directive. See  note.   stated she would further discuss with Psychiatry to clarify who Patient directed as healthcare appointee  -Discussed with  today who states OT felt discharged home appropriate with Home Health Care based on current functon.  Patient takes only 2 medications dosed daily.   will speak with Health Care appointee;  Trent Escobar regarding these  plans.  Discussion with Trent previously was for patient to be discharged to TCU.    Hematuria  Noted on urinalysis for many years  -Follow-up with outpatient urology discharge    Concern for UTI  -Patient denies urinary symptoms  -Urine culture with no organisms  -Discontinue ceftriaxone    HTN  -Hold PTA amlodipine  -Blood pressure somewhat erratic, in general WNL.    -As needed's hydralazine and metoprolol available    Subacute left basal ganglia stroke with possible petechial hemorrhage within the above infarct  Chronic-appearing infarct in the inferior medial right cerebellum  Multiple probable chronic microhemorrhages within both cerebral hemispheres and cerebellum  Right carotid stenosis  Presented on 1/14/2021 with speech/language impairment and gait disturbance.  On review of chart patient was then offered TPA, he had declined.   A1c 5.6%, LDL 93. TTE - EF normal, normal sized atria  -Restart PTA aspirin 325 mg oral daily  -outpatient f/u with neurology for carotid stenosis  - resume statin     THADDEUS, pre-renal, resolved  During admission last month noted to have a creatinine of 1.41 that improved to 1.01.  Have again presents with creatinine of 1.22.  Avoid nephrotoxic medications.   -Last CR WNL.  Eating, taking in fluids.    Chronic obstructive pulmonary disease history  He smoked for over 40 years, a pack a day, but quit a few years ago.  Not requiring any inhalers.  Monitor        Diet: Combination Diet Low Saturated Fat Na <2400mg Diet    DVT Prophylaxis: Pneumatic Compression Devices  Shen Catheter: not present  Code Status: Full Code           Disposition Plan   Expected discharge: TBD, given complex neurocognitive/disposition issues needing HealthCare appointee to assist with medical decision making and discharge plananing see above.   Entered: Louis Delgado MD 02/25/2021, 1:12 PM    ______________________________________________________________________    Interval History   Patient was more  alert today, interactive, could answer his question appropriately.  Oriented to person and place, not time.  Nursing notes no acute behavioral issues.    Data reviewed today: I reviewed all medications, new labs and imaging results over the last 24 hours.     Physical Exam   Vital Signs: Temp: 97.4  F (36.3  C) Temp src: Oral BP: 119/82 Pulse: 75   Resp: 16 SpO2: 96 % O2 Device: None (Room air)    Weight: 171 lbs 4.76 oz  Constitutional:    NAD, alert, calm, cooperative    Eyes: Conjunctiva and pupils examined and normal.  HEENT: Moist mucous membranes, normal dentition.  Respiratory: Clear to auscultation bilaterally, no crackles or wheezing.  Cardiovascular: Regular rate and rhythm, normal S1 and S2, and no murmur noted.  GI: Soft, non-distended, non-tender, normal bowel sounds.  Skin: No rashes, no cyanosis, no edema noted on exposed skin.  Musculoskeletal: No joint swelling, erythema or tenderness. No gross bony abnormalities  Neuro.  Gross motor tested, nonfocal, sensory intact  Psych oriented, affect calm     I spent >35 minutes on the unit/floor in management of care today.  Over 50% of my time was spent  Coordinating Care and plan with Nursing and SW regarding behaviors s management and surveillance and discharge planning, see above..     Medications       aspirin  325 mg Oral or NG Tube Daily     atorvastatin  40 mg Oral QPM

## 2024-04-27 NOTE — Clinical Note
Patient Clipped and Prepped: left chest, left neck and left subclavian. Prepped with ChloraPrep, a minimum of 3 minute dry time, longer if needed, no pooling noted, patient draped in sterile fashion.

## 2024-04-27 NOTE — Clinical Note
Patient unable to continue procedure due to pain. Procedure stopped and will plan for anesthesia tomorrow

## 2024-04-28 PROBLEM — A41.9 SEPSIS, DUE TO UNSPECIFIED ORGANISM, UNSPECIFIED WHETHER ACUTE ORGAN DYSFUNCTION PRESENT (MULTI): Status: ACTIVE | Noted: 2024-04-28

## 2024-04-28 LAB
ALBUMIN SERPL-MCNC: 3.1 G/DL (ref 3.5–5)
ALP BLD-CCNC: 69 U/L (ref 35–125)
ALT SERPL-CCNC: <5 U/L (ref 5–40)
ANION GAP BLDV CALCULATED.4IONS-SCNC: 18 MMOL/L (ref 10–25)
ANION GAP SERPL CALC-SCNC: >19 MMOL/L
ANION GAP SERPL CALC-SCNC: >19 MMOL/L
APTT PPP: 32.6 SECONDS (ref 22–32.5)
AST SERPL-CCNC: 12 U/L (ref 5–40)
BASE EXCESS BLDV CALC-SCNC: -3.7 MMOL/L (ref -2–3)
BASOPHILS # BLD AUTO: 0.05 X10*3/UL (ref 0–0.1)
BASOPHILS NFR BLD AUTO: 0.4 %
BILIRUB SERPL-MCNC: 0.4 MG/DL (ref 0.1–1.2)
BODY TEMPERATURE: 37 DEGREES CELSIUS
BUN SERPL-MCNC: 35 MG/DL (ref 8–25)
BUN SERPL-MCNC: 40 MG/DL (ref 8–25)
CA-I BLDV-SCNC: 1.16 MMOL/L (ref 1.1–1.33)
CALCIUM SERPL-MCNC: 7.8 MG/DL (ref 8.5–10.4)
CALCIUM SERPL-MCNC: 8.9 MG/DL (ref 8.5–10.4)
CHLORIDE BLDV-SCNC: 93 MMOL/L (ref 98–107)
CHLORIDE SERPL-SCNC: 87 MMOL/L (ref 97–107)
CHLORIDE SERPL-SCNC: 88 MMOL/L (ref 97–107)
CK SERPL-CCNC: 47 U/L (ref 24–195)
CO2 SERPL-SCNC: 20 MMOL/L (ref 24–31)
CO2 SERPL-SCNC: 21 MMOL/L (ref 24–31)
CREAT SERPL-MCNC: 7.8 MG/DL (ref 0.4–1.6)
CREAT SERPL-MCNC: 8.4 MG/DL (ref 0.4–1.6)
EGFRCR SERPLBLD CKD-EPI 2021: 5 ML/MIN/1.73M*2
EGFRCR SERPLBLD CKD-EPI 2021: 6 ML/MIN/1.73M*2
EOSINOPHIL # BLD AUTO: 0.19 X10*3/UL (ref 0–0.7)
EOSINOPHIL NFR BLD AUTO: 1.3 %
ERYTHROCYTE [DISTWIDTH] IN BLOOD BY AUTOMATED COUNT: 19 % (ref 11.5–14.5)
GLUCOSE BLD MANUAL STRIP-MCNC: 119 MG/DL (ref 74–99)
GLUCOSE BLD MANUAL STRIP-MCNC: 75 MG/DL (ref 74–99)
GLUCOSE BLD MANUAL STRIP-MCNC: 77 MG/DL (ref 74–99)
GLUCOSE BLD MANUAL STRIP-MCNC: 83 MG/DL (ref 74–99)
GLUCOSE BLDV-MCNC: 84 MG/DL (ref 74–99)
GLUCOSE SERPL-MCNC: 60 MG/DL (ref 65–99)
GLUCOSE SERPL-MCNC: 90 MG/DL (ref 65–99)
HCO3 BLDV-SCNC: 22.2 MMOL/L (ref 22–26)
HCT VFR BLD AUTO: 26.7 % (ref 36–46)
HCT VFR BLD EST: 25 % (ref 36–46)
HGB BLD-MCNC: 7.9 G/DL (ref 12–16)
HGB BLDV-MCNC: 8.4 G/DL (ref 12–16)
IMM GRANULOCYTES # BLD AUTO: 0.08 X10*3/UL (ref 0–0.7)
IMM GRANULOCYTES NFR BLD AUTO: 0.6 % (ref 0–0.9)
INHALED O2 CONCENTRATION: 21 %
INR PPP: 1.4 (ref 0.9–1.2)
LACTATE BLDV-SCNC: 0.9 MMOL/L (ref 0.4–2)
LYMPHOCYTES # BLD AUTO: 0.77 X10*3/UL (ref 1.2–4.8)
LYMPHOCYTES NFR BLD AUTO: 5.5 %
MAGNESIUM SERPL-MCNC: 1.5 MG/DL (ref 1.6–3.1)
MAGNESIUM SERPL-MCNC: 2.4 MG/DL (ref 1.6–3.1)
MCH RBC QN AUTO: 25.9 PG (ref 26–34)
MCHC RBC AUTO-ENTMCNC: 29.6 G/DL (ref 32–36)
MCV RBC AUTO: 88 FL (ref 80–100)
MONOCYTES # BLD AUTO: 0.99 X10*3/UL (ref 0.1–1)
MONOCYTES NFR BLD AUTO: 7 %
MRSA DNA SPEC QL NAA+PROBE: DETECTED
NEUTROPHILS # BLD AUTO: 12.01 X10*3/UL (ref 1.2–7.7)
NEUTROPHILS NFR BLD AUTO: 85.2 %
NRBC BLD-RTO: 0 /100 WBCS (ref 0–0)
OXYHGB MFR BLDV: 69.4 % (ref 45–75)
PCO2 BLDV: 43 MM HG (ref 41–51)
PH BLDV: 7.32 PH (ref 7.33–7.43)
PHOSPHATE SERPL-MCNC: 2.7 MG/DL (ref 2.5–4.5)
PLATELET # BLD AUTO: 191 X10*3/UL (ref 150–450)
PO2 BLDV: 42 MM HG (ref 35–45)
POTASSIUM BLDV-SCNC: 5.7 MMOL/L (ref 3.5–5.3)
POTASSIUM SERPL-SCNC: 4.7 MMOL/L (ref 3.4–5.1)
POTASSIUM SERPL-SCNC: 5.7 MMOL/L (ref 3.4–5.1)
PROT SERPL-MCNC: 7.2 G/DL (ref 5.9–7.9)
PROTHROMBIN TIME: 14 SECONDS (ref 9.3–12.7)
RBC # BLD AUTO: 3.05 X10*6/UL (ref 4–5.2)
SAO2 % BLDV: 72 % (ref 45–75)
SODIUM BLDV-SCNC: 127 MMOL/L (ref 136–145)
SODIUM SERPL-SCNC: 133 MMOL/L (ref 133–145)
SODIUM SERPL-SCNC: 133 MMOL/L (ref 133–145)
T4 FREE SERPL-MCNC: 0.9 NG/DL (ref 0.9–1.7)
TROPONIN T SERPL-MCNC: 71 NG/L
TSH SERPL DL<=0.05 MIU/L-ACNC: 2.12 MIU/L (ref 0.27–4.2)
VANCOMYCIN SERPL-MCNC: 43.9 UG/ML (ref 10–20)
WBC # BLD AUTO: 14.1 X10*3/UL (ref 4.4–11.3)

## 2024-04-28 PROCEDURE — 2500000004 HC RX 250 GENERAL PHARMACY W/ HCPCS (ALT 636 FOR OP/ED): Performed by: INTERNAL MEDICINE

## 2024-04-28 PROCEDURE — 87641 MR-STAPH DNA AMP PROBE: CPT

## 2024-04-28 PROCEDURE — 85025 COMPLETE CBC W/AUTO DIFF WBC: CPT

## 2024-04-28 PROCEDURE — 36415 COLL VENOUS BLD VENIPUNCTURE: CPT | Performed by: STUDENT IN AN ORGANIZED HEALTH CARE EDUCATION/TRAINING PROGRAM

## 2024-04-28 PROCEDURE — 84075 ASSAY ALKALINE PHOSPHATASE: CPT | Performed by: STUDENT IN AN ORGANIZED HEALTH CARE EDUCATION/TRAINING PROGRAM

## 2024-04-28 PROCEDURE — 2500000004 HC RX 250 GENERAL PHARMACY W/ HCPCS (ALT 636 FOR OP/ED): Performed by: STUDENT IN AN ORGANIZED HEALTH CARE EDUCATION/TRAINING PROGRAM

## 2024-04-28 PROCEDURE — 80202 ASSAY OF VANCOMYCIN: CPT

## 2024-04-28 PROCEDURE — 2020000001 HC ICU ROOM DAILY

## 2024-04-28 PROCEDURE — 5A1D70Z PERFORMANCE OF URINARY FILTRATION, INTERMITTENT, LESS THAN 6 HOURS PER DAY: ICD-10-PCS | Performed by: STUDENT IN AN ORGANIZED HEALTH CARE EDUCATION/TRAINING PROGRAM

## 2024-04-28 PROCEDURE — 2500000004 HC RX 250 GENERAL PHARMACY W/ HCPCS (ALT 636 FOR OP/ED)

## 2024-04-28 PROCEDURE — 84132 ASSAY OF SERUM POTASSIUM: CPT | Mod: 91

## 2024-04-28 PROCEDURE — 84100 ASSAY OF PHOSPHORUS: CPT

## 2024-04-28 PROCEDURE — 99223 1ST HOSP IP/OBS HIGH 75: CPT | Performed by: STUDENT IN AN ORGANIZED HEALTH CARE EDUCATION/TRAINING PROGRAM

## 2024-04-28 PROCEDURE — 2500000006 HC RX 250 W HCPCS SELF ADMINISTERED DRUGS (ALT 637 FOR ALL PAYERS)

## 2024-04-28 PROCEDURE — 2500000005 HC RX 250 GENERAL PHARMACY W/O HCPCS

## 2024-04-28 PROCEDURE — 82947 ASSAY GLUCOSE BLOOD QUANT: CPT

## 2024-04-28 PROCEDURE — 36415 COLL VENOUS BLD VENIPUNCTURE: CPT

## 2024-04-28 PROCEDURE — 02HV33Z INSERTION OF INFUSION DEVICE INTO SUPERIOR VENA CAVA, PERCUTANEOUS APPROACH: ICD-10-PCS | Performed by: STUDENT IN AN ORGANIZED HEALTH CARE EDUCATION/TRAINING PROGRAM

## 2024-04-28 PROCEDURE — 0J2TXYZ CHANGE OTHER DEVICE IN TRUNK SUBCUTANEOUS TISSUE AND FASCIA, EXTERNAL APPROACH: ICD-10-PCS | Performed by: STUDENT IN AN ORGANIZED HEALTH CARE EDUCATION/TRAINING PROGRAM

## 2024-04-28 PROCEDURE — 84439 ASSAY OF FREE THYROXINE: CPT

## 2024-04-28 PROCEDURE — 82550 ASSAY OF CK (CPK): CPT

## 2024-04-28 PROCEDURE — 36556 INSERT NON-TUNNEL CV CATH: CPT | Performed by: STUDENT IN AN ORGANIZED HEALTH CARE EDUCATION/TRAINING PROGRAM

## 2024-04-28 PROCEDURE — 2500000002 HC RX 250 W HCPCS SELF ADMINISTERED DRUGS (ALT 637 FOR MEDICARE OP, ALT 636 FOR OP/ED)

## 2024-04-28 PROCEDURE — 84443 ASSAY THYROID STIM HORMONE: CPT

## 2024-04-28 PROCEDURE — 0JH63XZ INSERTION OF TUNNELED VASCULAR ACCESS DEVICE INTO CHEST SUBCUTANEOUS TISSUE AND FASCIA, PERCUTANEOUS APPROACH: ICD-10-PCS | Performed by: STUDENT IN AN ORGANIZED HEALTH CARE EDUCATION/TRAINING PROGRAM

## 2024-04-28 PROCEDURE — 99291 CRITICAL CARE FIRST HOUR: CPT

## 2024-04-28 PROCEDURE — 84484 ASSAY OF TROPONIN QUANT: CPT

## 2024-04-28 PROCEDURE — 83735 ASSAY OF MAGNESIUM: CPT | Mod: 91

## 2024-04-28 PROCEDURE — 37799 UNLISTED PX VASCULAR SURGERY: CPT | Performed by: STUDENT IN AN ORGANIZED HEALTH CARE EDUCATION/TRAINING PROGRAM

## 2024-04-28 PROCEDURE — 83735 ASSAY OF MAGNESIUM: CPT

## 2024-04-28 PROCEDURE — 85610 PROTHROMBIN TIME: CPT | Performed by: STUDENT IN AN ORGANIZED HEALTH CARE EDUCATION/TRAINING PROGRAM

## 2024-04-28 RX ORDER — HEPARIN SODIUM 5000 [USP'U]/ML
5000 INJECTION, SOLUTION INTRAVENOUS; SUBCUTANEOUS EVERY 8 HOURS SCHEDULED
Status: DISCONTINUED | OUTPATIENT
Start: 2024-04-28 | End: 2024-05-08 | Stop reason: HOSPADM

## 2024-04-28 RX ORDER — VANCOMYCIN 1.75 G/350ML
1250 INJECTION, SOLUTION INTRAVENOUS ONCE
Status: COMPLETED | OUTPATIENT
Start: 2024-04-28 | End: 2024-04-28

## 2024-04-28 RX ORDER — DEXTROSE 50 % IN WATER (D50W) INTRAVENOUS SYRINGE
12.5
Status: DISCONTINUED | OUTPATIENT
Start: 2024-04-28 | End: 2024-05-08 | Stop reason: HOSPADM

## 2024-04-28 RX ORDER — DIPHENHYDRAMINE HYDROCHLORIDE 50 MG/ML
25 INJECTION INTRAMUSCULAR; INTRAVENOUS ONCE
Status: COMPLETED | OUTPATIENT
Start: 2024-04-28 | End: 2024-04-28

## 2024-04-28 RX ORDER — HEPARIN SODIUM 1000 [USP'U]/ML
1000 INJECTION, SOLUTION INTRAVENOUS; SUBCUTANEOUS
Status: DISCONTINUED | OUTPATIENT
Start: 2024-04-28 | End: 2024-05-08 | Stop reason: HOSPADM

## 2024-04-28 RX ORDER — PROMETHAZINE HYDROCHLORIDE 25 MG/1
25 TABLET ORAL DAILY PRN
Status: DISCONTINUED | OUTPATIENT
Start: 2024-04-28 | End: 2024-05-08 | Stop reason: HOSPADM

## 2024-04-28 RX ORDER — OXYCODONE AND ACETAMINOPHEN 5; 325 MG/1; MG/1
1 TABLET ORAL EVERY 6 HOURS PRN
Status: DISCONTINUED | OUTPATIENT
Start: 2024-04-28 | End: 2024-05-01

## 2024-04-28 RX ORDER — LEVETIRACETAM 500 MG/1
500 TABLET ORAL NIGHTLY
Status: DISCONTINUED | OUTPATIENT
Start: 2024-04-28 | End: 2024-04-28

## 2024-04-28 RX ORDER — VANCOMYCIN HYDROCHLORIDE 1 G/20ML
INJECTION, POWDER, LYOPHILIZED, FOR SOLUTION INTRAVENOUS DAILY PRN
Status: DISCONTINUED | OUTPATIENT
Start: 2024-04-28 | End: 2024-04-29

## 2024-04-28 RX ORDER — METOPROLOL TARTRATE 1 MG/ML
5 INJECTION, SOLUTION INTRAVENOUS ONCE
Status: DISCONTINUED | OUTPATIENT
Start: 2024-04-28 | End: 2024-04-28

## 2024-04-28 RX ORDER — BUPROPION HYDROCHLORIDE 100 MG/1
100 TABLET ORAL EVERY OTHER DAY
Status: DISCONTINUED | OUTPATIENT
Start: 2024-04-28 | End: 2024-05-02

## 2024-04-28 RX ORDER — ACETAMINOPHEN 325 MG/1
650 TABLET ORAL EVERY 6 HOURS PRN
Status: DISCONTINUED | OUTPATIENT
Start: 2024-04-28 | End: 2024-05-08 | Stop reason: HOSPADM

## 2024-04-28 RX ORDER — PREGABALIN 50 MG/1
50 CAPSULE ORAL 2 TIMES DAILY
Status: DISCONTINUED | OUTPATIENT
Start: 2024-04-28 | End: 2024-05-08 | Stop reason: HOSPADM

## 2024-04-28 RX ORDER — LEVETIRACETAM 5 MG/ML
500 INJECTION INTRAVASCULAR NIGHTLY
Status: DISCONTINUED | OUTPATIENT
Start: 2024-04-28 | End: 2024-04-30

## 2024-04-28 RX ORDER — ASPIRIN 81 MG/1
81 TABLET ORAL DAILY
Status: DISCONTINUED | OUTPATIENT
Start: 2024-04-28 | End: 2024-05-08 | Stop reason: HOSPADM

## 2024-04-28 RX ORDER — METOPROLOL TARTRATE 1 MG/ML
5 INJECTION, SOLUTION INTRAVENOUS ONCE
Status: COMPLETED | OUTPATIENT
Start: 2024-04-28 | End: 2024-04-28

## 2024-04-28 RX ORDER — DIPHENHYDRAMINE HYDROCHLORIDE 50 MG/ML
50 INJECTION INTRAMUSCULAR; INTRAVENOUS ONCE
Status: DISCONTINUED | OUTPATIENT
Start: 2024-04-28 | End: 2024-04-30

## 2024-04-28 RX ORDER — MAGNESIUM SULFATE HEPTAHYDRATE 40 MG/ML
2 INJECTION, SOLUTION INTRAVENOUS ONCE
Status: COMPLETED | OUTPATIENT
Start: 2024-04-28 | End: 2024-04-28

## 2024-04-28 RX ORDER — ATORVASTATIN CALCIUM 40 MG/1
40 TABLET, FILM COATED ORAL NIGHTLY
Status: DISCONTINUED | OUTPATIENT
Start: 2024-04-28 | End: 2024-05-08 | Stop reason: HOSPADM

## 2024-04-28 RX ORDER — DEXTROSE 50 % IN WATER (D50W) INTRAVENOUS SYRINGE
25
Status: DISCONTINUED | OUTPATIENT
Start: 2024-04-28 | End: 2024-05-08 | Stop reason: HOSPADM

## 2024-04-28 RX ORDER — CALCITRIOL 0.25 UG/1
0.5 CAPSULE ORAL DAILY
Status: DISCONTINUED | OUTPATIENT
Start: 2024-04-28 | End: 2024-05-08 | Stop reason: HOSPADM

## 2024-04-28 RX ORDER — CALCIUM GLUCONATE 20 MG/ML
2 INJECTION, SOLUTION INTRAVENOUS ONCE
Status: COMPLETED | OUTPATIENT
Start: 2024-04-28 | End: 2024-04-28

## 2024-04-28 RX ORDER — LORAZEPAM 2 MG/ML
0.5 INJECTION INTRAMUSCULAR ONCE
Status: DISCONTINUED | OUTPATIENT
Start: 2024-04-28 | End: 2024-04-28

## 2024-04-28 RX ORDER — CALCIUM CHLORIDE INJECTION 100 MG/ML
1 INJECTION, SOLUTION INTRAVENOUS ONCE
Status: DISCONTINUED | OUTPATIENT
Start: 2024-04-28 | End: 2024-04-28

## 2024-04-28 RX ORDER — DEXTROSE 50 % IN WATER (D50W) INTRAVENOUS SYRINGE
25 ONCE
Status: COMPLETED | OUTPATIENT
Start: 2024-04-28 | End: 2024-04-28

## 2024-04-28 RX ORDER — DEXTROSE MONOHYDRATE 100 MG/ML
50 INJECTION, SOLUTION INTRAVENOUS CONTINUOUS
Status: DISCONTINUED | OUTPATIENT
Start: 2024-04-28 | End: 2024-04-28

## 2024-04-28 RX ORDER — METOPROLOL TARTRATE 1 MG/ML
10 INJECTION, SOLUTION INTRAVENOUS ONCE
Status: DISCONTINUED | OUTPATIENT
Start: 2024-04-28 | End: 2024-04-28

## 2024-04-28 RX ORDER — ERGOCALCIFEROL 1.25 MG/1
1250 CAPSULE ORAL
Status: DISCONTINUED | OUTPATIENT
Start: 2024-04-28 | End: 2024-05-08 | Stop reason: HOSPADM

## 2024-04-28 RX ORDER — POLYETHYLENE GLYCOL 3350 17 G/17G
17 POWDER, FOR SOLUTION ORAL DAILY PRN
Status: DISCONTINUED | OUTPATIENT
Start: 2024-04-28 | End: 2024-05-08 | Stop reason: HOSPADM

## 2024-04-28 RX ORDER — CALCIUM CHLORIDE INJECTION 100 MG/ML
0.5 INJECTION, SOLUTION INTRAVENOUS ONCE
Status: DISCONTINUED | OUTPATIENT
Start: 2024-04-28 | End: 2024-04-28

## 2024-04-28 RX ADMIN — METOPROLOL TARTRATE 5 MG: 5 INJECTION, SOLUTION INTRAVENOUS at 06:45

## 2024-04-28 RX ADMIN — DEXTROSE MONOHYDRATE 25 G: 25 INJECTION, SOLUTION INTRAVENOUS at 03:06

## 2024-04-28 RX ADMIN — DIPHENHYDRAMINE HYDROCHLORIDE 25 MG: 50 INJECTION INTRAMUSCULAR; INTRAVENOUS at 21:28

## 2024-04-28 RX ADMIN — PIPERACILLIN SODIUM AND TAZOBACTAM SODIUM 2.25 G: 2; .25 INJECTION, SOLUTION INTRAVENOUS at 06:06

## 2024-04-28 RX ADMIN — HYDROMORPHONE HYDROCHLORIDE 0.2 MG: 1 INJECTION, SOLUTION INTRAMUSCULAR; INTRAVENOUS; SUBCUTANEOUS at 18:14

## 2024-04-28 RX ADMIN — VANCOMYCIN 1250 MG: 1.75 INJECTION, SOLUTION INTRAVENOUS at 02:40

## 2024-04-28 RX ADMIN — PIPERACILLIN SODIUM AND TAZOBACTAM SODIUM 2.25 G: 2; .25 INJECTION, SOLUTION INTRAVENOUS at 14:44

## 2024-04-28 RX ADMIN — MAGNESIUM SULFATE HEPTAHYDRATE 2 G: 40 INJECTION, SOLUTION INTRAVENOUS at 06:44

## 2024-04-28 RX ADMIN — METOPROLOL TARTRATE 5 MG: 5 INJECTION INTRAVENOUS at 23:06

## 2024-04-28 RX ADMIN — LEVETIRACETAM 500 MG: 5 INJECTION INTRAVENOUS at 23:36

## 2024-04-28 RX ADMIN — HYDROMORPHONE HYDROCHLORIDE 0.2 MG: 1 INJECTION, SOLUTION INTRAMUSCULAR; INTRAVENOUS; SUBCUTANEOUS at 04:46

## 2024-04-28 RX ADMIN — HEPARIN SODIUM 1500 UNITS: 1000 INJECTION INTRAVENOUS; SUBCUTANEOUS at 22:28

## 2024-04-28 RX ADMIN — PROMETHAZINE HYDROCHLORIDE 12.5 MG: 25 INJECTION INTRAMUSCULAR; INTRAVENOUS at 05:30

## 2024-04-28 RX ADMIN — PIPERACILLIN SODIUM AND TAZOBACTAM SODIUM 2.25 G: 2; .25 INJECTION, SOLUTION INTRAVENOUS at 21:35

## 2024-04-28 RX ADMIN — DIPHENHYDRAMINE HYDROCHLORIDE 25 MG: 50 INJECTION, SOLUTION INTRAMUSCULAR; INTRAVENOUS at 04:46

## 2024-04-28 RX ADMIN — HYDROMORPHONE HYDROCHLORIDE 0.2 MG: 1 INJECTION, SOLUTION INTRAMUSCULAR; INTRAVENOUS; SUBCUTANEOUS at 11:51

## 2024-04-28 RX ADMIN — CALCIUM CHLORIDE 0.5 G: 100 INJECTION, SOLUTION INTRAVENOUS at 06:00

## 2024-04-28 RX ADMIN — HEPARIN SODIUM 1500 UNITS: 1000 INJECTION INTRAVENOUS; SUBCUTANEOUS at 22:27

## 2024-04-28 RX ADMIN — CALCIUM GLUCONATE 2 G: 20 INJECTION, SOLUTION INTRAVENOUS at 06:31

## 2024-04-28 RX ADMIN — HYDROMORPHONE HYDROCHLORIDE 0.2 MG: 1 INJECTION, SOLUTION INTRAMUSCULAR; INTRAVENOUS; SUBCUTANEOUS at 02:42

## 2024-04-28 RX ADMIN — ACETAMINOPHEN 975 MG: 325 TABLET ORAL at 02:39

## 2024-04-28 RX ADMIN — DIPHENHYDRAMINE HYDROCHLORIDE 25 MG: 50 INJECTION, SOLUTION INTRAMUSCULAR; INTRAVENOUS at 17:51

## 2024-04-28 RX ADMIN — INSULIN HUMAN 10 UNITS: 100 INJECTION, SOLUTION PARENTERAL at 03:05

## 2024-04-28 RX ADMIN — NOREPINEPHRINE BITARTRATE 0.01 MCG/KG/MIN: 8 INJECTION, SOLUTION INTRAVENOUS at 03:45

## 2024-04-28 SDOH — SOCIAL STABILITY: SOCIAL INSECURITY: HAS ANYONE EVER THREATENED TO HURT YOUR FAMILY OR YOUR PETS?: NO

## 2024-04-28 SDOH — SOCIAL STABILITY: SOCIAL INSECURITY: HAVE YOU HAD THOUGHTS OF HARMING ANYONE ELSE?: NO

## 2024-04-28 SDOH — SOCIAL STABILITY: SOCIAL INSECURITY: ARE THERE ANY APPARENT SIGNS OF INJURIES/BEHAVIORS THAT COULD BE RELATED TO ABUSE/NEGLECT?: NO

## 2024-04-28 SDOH — SOCIAL STABILITY: SOCIAL INSECURITY: WERE YOU ABLE TO COMPLETE ALL THE BEHAVIORAL HEALTH SCREENINGS?: YES

## 2024-04-28 SDOH — SOCIAL STABILITY: SOCIAL INSECURITY: HAVE YOU HAD ANY THOUGHTS OF HARMING ANYONE ELSE?: NO

## 2024-04-28 SDOH — SOCIAL STABILITY: SOCIAL INSECURITY: DO YOU FEEL ANYONE HAS EXPLOITED OR TAKEN ADVANTAGE OF YOU FINANCIALLY OR OF YOUR PERSONAL PROPERTY?: NO

## 2024-04-28 SDOH — SOCIAL STABILITY: SOCIAL INSECURITY: ABUSE: ADULT

## 2024-04-28 SDOH — SOCIAL STABILITY: SOCIAL INSECURITY: DOES ANYONE TRY TO KEEP YOU FROM HAVING/CONTACTING OTHER FRIENDS OR DOING THINGS OUTSIDE YOUR HOME?: NO

## 2024-04-28 SDOH — SOCIAL STABILITY: SOCIAL INSECURITY: ARE YOU OR HAVE YOU BEEN THREATENED OR ABUSED PHYSICALLY, EMOTIONALLY, OR SEXUALLY BY ANYONE?: NO

## 2024-04-28 SDOH — SOCIAL STABILITY: SOCIAL INSECURITY: DO YOU FEEL UNSAFE GOING BACK TO THE PLACE WHERE YOU ARE LIVING?: NO

## 2024-04-28 ASSESSMENT — ENCOUNTER SYMPTOMS
APPETITE CHANGE: 1
DIARRHEA: 0
SEIZURES: 0
SHORTNESS OF BREATH: 0
NAUSEA: 1
ARTHRALGIAS: 0
SINUS PRESSURE: 0
WOUND: 0
ADENOPATHY: 0
PHOTOPHOBIA: 0
COUGH: 0
SPEECH DIFFICULTY: 0
PALPITATIONS: 0
JOINT SWELLING: 0
FEVER: 1
BACK PAIN: 0
TREMORS: 0
NECK PAIN: 0
ABDOMINAL PAIN: 0
APNEA: 0
POLYPHAGIA: 0
CHILLS: 1
DYSURIA: 0
SLEEP DISTURBANCE: 0
SORE THROAT: 0
WHEEZING: 0
CONFUSION: 0
RECTAL PAIN: 0
HEADACHES: 0
MYALGIAS: 0
LIGHT-HEADEDNESS: 0
WEAKNESS: 0
HALLUCINATIONS: 0
NUMBNESS: 0
FREQUENCY: 0
BLOOD IN STOOL: 0
DIZZINESS: 0
BRUISES/BLEEDS EASILY: 0
COLOR CHANGE: 0
FATIGUE: 1
POLYDIPSIA: 0
VOMITING: 0
CONSTIPATION: 0
HEMATURIA: 0

## 2024-04-28 ASSESSMENT — ACTIVITIES OF DAILY LIVING (ADL)
GROOMING: INDEPENDENT
HEARING - RIGHT EAR: FUNCTIONAL
WALKS IN HOME: INDEPENDENT
DRESSING YOURSELF: INDEPENDENT
PATIENT'S MEMORY ADEQUATE TO SAFELY COMPLETE DAILY ACTIVITIES?: YES
BATHING: INDEPENDENT
HEARING - LEFT EAR: FUNCTIONAL
ASSISTIVE_DEVICE: EYEGLASSES;CANE
ADEQUATE_TO_COMPLETE_ADL: YES
TOILETING: INDEPENDENT
JUDGMENT_ADEQUATE_SAFELY_COMPLETE_DAILY_ACTIVITIES: YES
FEEDING YOURSELF: INDEPENDENT

## 2024-04-28 ASSESSMENT — PAIN DESCRIPTION - LOCATION
LOCATION: GENERALIZED

## 2024-04-28 ASSESSMENT — PATIENT HEALTH QUESTIONNAIRE - PHQ9
1. LITTLE INTEREST OR PLEASURE IN DOING THINGS: NOT AT ALL
2. FEELING DOWN, DEPRESSED OR HOPELESS: NOT AT ALL
SUM OF ALL RESPONSES TO PHQ9 QUESTIONS 1 & 2: 0
2. FEELING DOWN, DEPRESSED OR HOPELESS: NOT AT ALL
1. LITTLE INTEREST OR PLEASURE IN DOING THINGS: NOT AT ALL
SUM OF ALL RESPONSES TO PHQ9 QUESTIONS 1 & 2: 0

## 2024-04-28 ASSESSMENT — LIFESTYLE VARIABLES
HOW OFTEN DO YOU HAVE A DRINK CONTAINING ALCOHOL: NEVER
AUDIT-C TOTAL SCORE: 0
HOW OFTEN DO YOU HAVE 6 OR MORE DRINKS ON ONE OCCASION: NEVER
SUBSTANCE_ABUSE_PAST_12_MONTHS: NO
SKIP TO QUESTIONS 9-10: 1
AUDIT-C TOTAL SCORE: 0
HOW OFTEN DO YOU HAVE A DRINK CONTAINING ALCOHOL: NEVER
PRESCIPTION_ABUSE_PAST_12_MONTHS: NO
HOW OFTEN DO YOU HAVE 6 OR MORE DRINKS ON ONE OCCASION: NEVER
AUDIT-C TOTAL SCORE: 0
HOW MANY STANDARD DRINKS CONTAINING ALCOHOL DO YOU HAVE ON A TYPICAL DAY: PATIENT DOES NOT DRINK
SKIP TO QUESTIONS 9-10: 1
AUDIT-C TOTAL SCORE: 0
SUBSTANCE_ABUSE_PAST_12_MONTHS: NO
HOW MANY STANDARD DRINKS CONTAINING ALCOHOL DO YOU HAVE ON A TYPICAL DAY: PATIENT DOES NOT DRINK
PRESCIPTION_ABUSE_PAST_12_MONTHS: NO

## 2024-04-28 ASSESSMENT — COGNITIVE AND FUNCTIONAL STATUS - GENERAL
MOBILITY SCORE: 24
PATIENT BASELINE BEDBOUND: NO
DAILY ACTIVITIY SCORE: 24

## 2024-04-28 ASSESSMENT — PAIN SCALES - GENERAL
PAINLEVEL_OUTOF10: 10 - WORST POSSIBLE PAIN
PAINLEVEL_OUTOF10: 10 - WORST POSSIBLE PAIN
PAINLEVEL_OUTOF10: 5 - MODERATE PAIN
PAINLEVEL_OUTOF10: 10 - WORST POSSIBLE PAIN
PAINLEVEL_OUTOF10: 7
PAINLEVEL_OUTOF10: 5 - MODERATE PAIN
PAINLEVEL_OUTOF10: 2
PAINLEVEL_OUTOF10: 10 - WORST POSSIBLE PAIN
PAINLEVEL_OUTOF10: 0 - NO PAIN
PAINLEVEL_OUTOF10: 10 - WORST POSSIBLE PAIN

## 2024-04-28 ASSESSMENT — PAIN SCALES - WONG BAKER: WONGBAKER_NUMERICALRESPONSE: NO HURT

## 2024-04-28 ASSESSMENT — PAIN - FUNCTIONAL ASSESSMENT
PAIN_FUNCTIONAL_ASSESSMENT: 0-10

## 2024-04-28 ASSESSMENT — COLUMBIA-SUICIDE SEVERITY RATING SCALE - C-SSRS
1. IN THE PAST MONTH, HAVE YOU WISHED YOU WERE DEAD OR WISHED YOU COULD GO TO SLEEP AND NOT WAKE UP?: NO
2. HAVE YOU ACTUALLY HAD ANY THOUGHTS OF KILLING YOURSELF?: NO
6. HAVE YOU EVER DONE ANYTHING, STARTED TO DO ANYTHING, OR PREPARED TO DO ANYTHING TO END YOUR LIFE?: NO

## 2024-04-28 ASSESSMENT — PAIN DESCRIPTION - DESCRIPTORS
DESCRIPTORS: ACHING

## 2024-04-28 NOTE — CONSULTS
"Vancomycin Dosing by Pharmacy- INITIAL    Batsheva Page is a 48 y.o. year old female who Pharmacy has been consulted for vancomycin dosing for pneumonia. Based on the patient's indication and renal status this patient will be dosed based on a goal trough/random level of 15-20. Pr is HD patient.    Renal function is currently declining.    Visit Vitals  BP (!) 79/37 (BP Location: Right leg)   Pulse (!) 101   Temp 38 °C (100.4 °F)   Resp 18        Lab Results   Component Value Date    CREATININE 7.70 (H) 04/27/2024    CREATININE 3.90 (H) 04/06/2024    CREATININE 4.90 (H) 04/05/2024    CREATININE 6.10 (H) 04/04/2024        Patient weight is No results found for: \"PTWEIGHT\"    No results found for: \"CULTURE\"     No intake/output data recorded.  [unfilled]    Lab Results   Component Value Date    PATIENTTEMP 37.0 12/30/2022    PATIENTTEMP 37.0 09/27/2022    PATIENTTEMP 37.0 09/27/2022          Assessment/Plan     Patient will be given a loading dose of 1250 mg.    This dosing regimen is predicted by [employer] to result in the following pharmacokinetic parameters:      Follow-up level will be ordered on 04/28/24 at 05:00 unless clinically indicated sooner.  Will continue to monitor renal function daily while on vancomycin and order serum creatinine at least every 48 hours if not already ordered.  Follow for continued vancomycin needs, clinical response, and signs/symptoms of toxicity.       Sarah Norton, [facility]       "

## 2024-04-28 NOTE — CARE PLAN
Spoke with Dr Pedersen.  Per nephrology Dr Pedersen who also spoke with ID Dr Soto, winacath will remain in for today. Patient has end stage access. Will await blood culture results and reassess tomorrow. Dr Chew (intensivist) aware and also spoke with nephrology.  IR was called by myself to inform them line does not need removed today.

## 2024-04-28 NOTE — PROCEDURES
"TRIALYSIS CVC INSERTION    Site: Right femoral vein      Pre-procedure diagnosis: Hyperkalemia, urgent dialysis  Post-procedure diagnosis: Same  Indication(s):  Inadequate IV access, HD  Performed by:  Me  Assistant(s):  None  EBL:   Min    Consent obtained: Yes  Patient identified: Yes  Timeout performed: Yes  Sedation / analgesia: None  Sterility:   Hat & mask on myself and assistant(s).  Site prepped with 2% CHG and 70% IPA solution using applicator.  Sterile gloves, gown, and drape.  Landmarks identified: Yes  Ultrasound guided: Yes, with sterile probe cover.  Local anesthetic: 5 mL 1% lidocaine using supplied 22 or 25 Ga. hypodermic needle.    Vein punctured with echogenic 18 Ga. x 2.5\" XTW introducer needle.  Supplied 0.032\" diameter spring guidewire thread easily through introducer needle and needle removed.  Small skin incision made adjacent to guidewire using #11 scalpel.  Site dilated using serial tissue dilators over guidewire.  24 cm trialysis catheter then thready easily over guidewire and guidewire removed.  Ports aspirated to remove air, then flushed with sterile NS.  Catheter secured with suture x 2.    No complications.  Other details: None.  Chest x-ray: Not needed            Lane Haney MD   "

## 2024-04-28 NOTE — PROGRESS NOTES
"[facility]       Date:  4/28/2024  Patient:  Batsheva Page  YOB: 1975  MRN:  [id number]   Admit Date:  4/27/2024    Chief Complaint   Patient presents with    Fever     High fever, chills, thinks she has a blood infection. Hx of many blood infections. 102.7 F at home 1/2 hour ago. Reports taking a couple Asprin, but no Tylenol. Dialysis pt. Last dialysis was yesterday. Symptoms started around Tuesday. Pt reports that it's hard to get a good BP reading on her, dialysis center always has a hard time with it.    Flu Symptoms       Patient is a 48 y.o. female admitted on 4/27/2024  8:50 PM with the following indication(s) for ICU care Septic shock.   Hospital day 0 ICU day 9h     History of Present Illness:  48 y.o. 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   Patient also complained of generalized myalgias, pain at the cath insertion site and malodor from the cath site. Patient described her outpatient dialysis center as \"filthy\" and noted improper care and access of her HD cath on multiple occasions by the staff there.     ED course:  Patient was hypotensive in the ER 76/36, was administered 250 ml of crystalloids, and a loading dose of vancomycin and zosyn. 2 sets of blood cultures were drawn.     Labs: WBC 21K with Left shift. Creatinine 7, Lactate 2.4, K- 6.6  She was sent to the MICU with a peripheral IV but no central catheters and no vasopressors.  Nephrology consulted for urgent dialysis.     Interval ICU Events:  4/28: Patient went into a narrow complex tachycardia this AM with rates up to 200's.  Norepinephrine gtt had been started for hypotension, turned off when patient became " tachycardic.  IV metoprolol given.  Patient currently in NSR with MAP>60 off vasopressors.  Mg was 1.5, replaced and redraw pending.  K 4.7 this morning, repeat pending.     Medical History:  Past Medical History:   Diagnosis Date    Aortic valve replaced 2022    CHF (congestive heart failure) (Multi)     Chronic pain     Coronary artery disease     Disease of thyroid gland     ESRD (end stage renal disease) (Multi)     H/O mitral valve replacement 2013    and 2022    Heart disease     History of transfusion     Hypertension     Mitral valve regurgitation     Seizures (Multi)     Stroke (Multi)      Past Surgical History:   Procedure Laterality Date    AORTIC VALVE REPLACEMENT  2020    bioprosthetic    CORONARY ARTERY BYPASS GRAFT      IR CVC TUNNELED  09/09/2022    IR CVC TUNNELED 9/9/2022 [facility]    IR CVC TUNNELED  12/28/2022    IR CVC TUNNELED 12/28/2022 [facility] [place] INPATIENT [facility]GAC[facility]    MITRAL VALVE REPLACEMENT  2013    bioprosthetic    MITRAL VALVE REPLACEMENT  2020    bioprosthetic    PARATHYROIDECTOMY      US GUIDED PERCUTANEOUS PLACEMENT  07/14/2022    US GUIDED PERCUTANEOUS PLACEMENT [facility]     Medications Prior to Admission   Medication Sig Dispense Refill Last Dose    aspirin 81 mg EC tablet Take 1 tablet (81 mg) by mouth once daily. 30 tablet 2 4/27/2024    atorvastatin (Lipitor) 40 mg tablet Take 1 tablet (40 mg) by mouth once daily.   4/27/2024    buPROPion (Wellbutrin) 100 mg tablet Take 1 tablet (100 mg) by mouth every other day. 30 tablet 3 Past Week    calcitriol (Rocaltrol) 0.25 mcg capsule Take 2 capsules (0.5 mcg) by mouth once daily.   4/27/2024    cloNIDine (Catapres) 0.1 mg tablet Take 1 tablet (0.1 mg) by mouth every 8 hours. 90 tablet 3 4/27/2024    epoetin brandy-epbx (RETACRIT) 20,000 unit/mL solution Inject 6,440 Units under the skin 3 times a week. Do not start before January 17, 2024. 30 mL 2 Past Week    ergocalciferol (Vitamin D-2) 1.25 MG (24256 UT) capsule Take 1  capsule (1,250 mcg) by mouth 1 (one) time per week.   Past Week    hydrALAZINE (Apresoline) 50 mg tablet Take 1 tablet (50 mg) by mouth 3 times a day.   4/27/2024    levETIRAcetam (Keppra) 500 mg tablet Take 1.5 tablets (750 mg) by mouth once daily at bedtime.   4/27/2024    metoprolol succinate XL (Toprol-XL) 50 mg 24 hr tablet TAKE 1 TABLET BY MOUTH TWICE DAILY 60 tablet 0 4/27/2024    oxyCODONE-acetaminophen (Percocet) 5-325 mg tablet Take 1 tablet by mouth every 6 hours as needed for moderate pain (4 - 6). 5 tablet 0 4/27/2024    pregabalin (Lyrica) 25 mg capsule Take 2 capsules (50 mg) by mouth 2 times a day. 120 capsule 0 4/27/2024    promethazine (Phenergan) 25 mg tablet Take 1 tablet (25 mg) by mouth once daily as needed.   4/27/2024    sodium zirconium cyclosilicate (Lokelma) 10 gram packet DISSOLVE 1 PACKET INTO 45ML OF WATER TAKE DAILY BEFORE DINNER. ADMINSTER OTHER MEDICTIONS AT LEAST 2 HOURS BEFORE OR 2 HOURS AFTER LOKELMA 30 each 0 Past Month    acetaminophen (Tylenol) 325 mg tablet Take 2 tablets (650 mg) by mouth every 6 hours as needed for mild pain (1 - 3). 30 tablet 0 Unknown     Kayexalate, Metoclopramide hcl, Prochlorperazine, Zofran [ondansetron hcl], and Ondansetron  Social History     Tobacco Use    Smoking status: Never    Smokeless tobacco: Never   Vaping Use    Vaping status: Never Used   Substance Use Topics    Alcohol use: Never    Drug use: Never     Family History   Problem Relation Name Age of Onset    No Known Problems Mother      No Known Problems Father         Hospital Medications:    norepinephrine, 0.01-0.5 mcg/kg/min (Ideal), Last Rate: Stopped (04/28/24 0644)          Current Facility-Administered Medications:     acetaminophen (Tylenol) tablet 650 mg, 650 mg, oral, q6h PRN, Alex E Hipps, PA-C    aspirin EC tablet 81 mg, 81 mg, oral, Daily, Alex E Hipps, PA-C    atorvastatin (Lipitor) tablet 40 mg, 40 mg, oral, Nightly, Alex E Hipps, PA-C    buPROPion (Wellbutrin) tablet 100  mg, 100 mg, oral, Every other day, Alex Alcocer PA-C    calcitriol (Rocaltrol) capsule 0.5 mcg, 0.5 mcg, oral, Daily, Alex Alcocer PA-C    dextrose 50 % injection 12.5 g, 12.5 g, intravenous, q15 min PRN, ANGELA Zarate    dextrose 50 % injection 25 g, 25 g, intravenous, q15 min PRN, ANGELA Zarate    [START ON 4/29/2024] epoetin brandy-epbx (RETACRIT) injection 6,500 Units, 6,500 Units, subcutaneous, Once per day on Monday Wednesday Friday, Alex Alcocer PA-C    ergocalciferol (Vitamin D-2) capsule 1,250 mcg, 1,250 mcg, oral, Every Sunday, Alex Alcocer PA-C    glucagon (Glucagen) injection 1 mg, 1 mg, intramuscular, q15 min PRN, ANGELA Zarate    glucagon (Glucagen) injection 1 mg, 1 mg, intramuscular, q15 min PRN, ANGELA Zarate    heparin (porcine) injection 5,000 Units, 5,000 Units, subcutaneous, q8h KIMBERLY, Alex Alcocer PA-C    HYDROmorphone (Dilaudid) injection 0.2 mg, 0.2 mg, intravenous, q3h PRN, Lane Haney MD, 0.2 mg at 04/28/24 0242    levETIRAcetam (Keppra) tablet 500 mg, 500 mg, oral, Nightly, Alex Alcocer PA-C    norepinephrine (Levophed) 8 mg in dextrose 5% 250 mL (0.032 mg/mL) infusion (premix), 0.01-0.5 mcg/kg/min (Ideal), intravenous, Continuous, Alex Alcocer PA-C, Stopped at 04/28/24 0644    oxyCODONE-acetaminophen (Percocet) 5-325 mg per tablet 1 tablet, 1 tablet, oral, q6h PRN, Alex Alcocer PA-C    piperacillin-tazobactam-dextrose (Zosyn) IV 2.25 g, 2.25 g, intravenous, q8h, ROSA Jacinto-C, Stopped at 04/28/24 0636    pregabalin (Lyrica) capsule 50 mg, 50 mg, oral, BID, Alxe Alcocer PA-C    promethazine (Phenergan) tablet 25 mg, 25 mg, oral, Daily PRN, Alex Alcocer PA-C    sodium zirconium cyclosilicate (Lokelma) packet 10 g, 10 g, oral, q8h, Alex Alcocer PA-C    vancomycin (Vancocin) pharmacy to dose - pharmacy monitoring, , miscellaneous, Daily PRN, Alex Alcocer PA-C    Review of Systems:  14 point review of systems was completed  "and negative except for those specially mention in my HPI    Physical Exam:    Heart Rate:  []   Temp:  [36.8 °C (98.2 °F)-38.4 °C (101.1 °F)]   Resp:  [16-38]   BP: (58-98)/(25-62)   Height:  [172.7 cm (5' 8\")]   Weight:  [63.7 kg (140 lb 6.9 oz)-70.6 kg (155 lb 10.3 oz)]   SpO2:  [58 %-97 %]     Physical Exam  Constitutional:       General: She is not in acute distress.  HENT:      Head: Normocephalic and atraumatic.   Eyes:      Extraocular Movements: Extraocular movements intact.      Conjunctiva/sclera: Conjunctivae normal.   Cardiovascular:      Rate and Rhythm: Normal rate and regular rhythm.      Pulses:           Radial pulses are 2+ on the right side and 2+ on the left side.        Dorsalis pedis pulses are 1+ on the right side and 1+ on the left side.   Pulmonary:      Breath sounds: Normal breath sounds and air entry.   Abdominal:      General: Bowel sounds are normal.      Palpations: Abdomen is soft.   Musculoskeletal:      Cervical back: Normal range of motion and neck supple.      Right lower leg: No edema.      Left lower leg: No edema.      Comments: 5/5 strength to all extremities   Neurological:      Mental Status: She is oriented to person, place, and time. She is lethargic.      GCS: GCS eye subscore is 3. GCS verbal subscore is 5. GCS motor subscore is 6.      Cranial Nerves: Cranial nerves 2-12 are intact.   Psychiatric:         Behavior: Behavior is cooperative.         Objective:    I have reviewed all medications, laboratory results, and imaging pertinent for today's encounter.           Intake/Output Summary (Last 24 hours) at 4/28/2024 1038  Last data filed at 4/28/2024 0909  Gross per 24 hour   Intake 154.16 ml   Output --   Net 154.16 ml       Results for orders placed or performed during the hospital encounter of 04/27/24 (from the past 24 hour(s))   CBC and Auto Differential   Result Value Ref Range    WBC 21.5 (H) 4.4 - 11.3 x10*3/uL    nRBC 0.0 0.0 - 0.0 /100 WBCs    RBC 3.72 " (L) 4.00 - 5.20 x10*6/uL    Hemoglobin 9.6 (L) 12.0 - 16.0 g/dL    Hematocrit 32.4 (L) 36.0 - 46.0 %    MCV 87 80 - 100 fL    MCH 25.8 (L) 26.0 - 34.0 pg    MCHC 29.6 (L) 32.0 - 36.0 g/dL    RDW 18.9 (H) 11.5 - 14.5 %    Platelets 253 150 - 450 x10*3/uL    Neutrophils % 91.6 40.0 - 80.0 %    Immature Granulocytes %, Automated 0.6 0.0 - 0.9 %    Lymphocytes % 3.1 13.0 - 44.0 %    Monocytes % 4.0 2.0 - 10.0 %    Eosinophils % 0.4 0.0 - 6.0 %    Basophils % 0.3 0.0 - 2.0 %    Neutrophils Absolute 19.74 (H) 1.20 - 7.70 x10*3/uL    Immature Granulocytes Absolute, Automated 0.13 0.00 - 0.70 x10*3/uL    Lymphocytes Absolute 0.66 (L) 1.20 - 4.80 x10*3/uL    Monocytes Absolute 0.87 0.10 - 1.00 x10*3/uL    Eosinophils Absolute 0.08 0.00 - 0.70 x10*3/uL    Basophils Absolute 0.06 0.00 - 0.10 x10*3/uL   Comprehensive metabolic panel   Result Value Ref Range    Glucose 99 65 - 99 mg/dL    Sodium 128 (L) 133 - 145 mmol/L    Potassium 6.6 (HH) 3.4 - 5.1 mmol/L    Chloride 83 (L) 97 - 107 mmol/L    Bicarbonate 24 24 - 31 mmol/L    Urea Nitrogen 31 (H) 8 - 25 mg/dL    Creatinine 7.70 (H) 0.40 - 1.60 mg/dL    eGFR 6 (L) >60 mL/min/1.73m*2    Calcium 8.5 8.5 - 10.4 mg/dL    Albumin 4.1 3.5 - 5.0 g/dL    Alkaline Phosphatase 100 35 - 125 U/L    Total Protein 9.0 (H) 5.9 - 7.9 g/dL    AST 17 5 - 40 U/L    Bilirubin, Total 0.4 0.1 - 1.2 mg/dL    ALT 5 5 - 40 U/L    Anion Gap >19 (H) <=19 mmol/L   Blood Gas Lactic Acid, Venous   Result Value Ref Range    POCT Lactate, Venous 2.5 (H) 0.4 - 2.0 mmol/L   Sars-CoV-2 and Influenza A/B PCR   Result Value Ref Range    Flu A Result Not Detected Not Detected    Flu B Result Not Detected Not Detected    Coronavirus 2019, PCR Not Detected Not Detected   Blood Gas Lactic Acid, Venous   Result Value Ref Range    POCT Lactate, Venous 2.2 (H) 0.4 - 2.0 mmol/L   POCT GLUCOSE   Result Value Ref Range    POCT Glucose 119 (H) 74 - 99 mg/dL   CBC and Auto Differential   Result Value Ref Range    WBC 14.1 (H)  4.4 - 11.3 x10*3/uL    nRBC 0.0 0.0 - 0.0 /100 WBCs    RBC 3.05 (L) 4.00 - 5.20 x10*6/uL    Hemoglobin 7.9 (L) 12.0 - 16.0 g/dL    Hematocrit 26.7 (L) 36.0 - 46.0 %    MCV 88 80 - 100 fL    MCH 25.9 (L) 26.0 - 34.0 pg    MCHC 29.6 (L) 32.0 - 36.0 g/dL    RDW 19.0 (H) 11.5 - 14.5 %    Platelets 191 150 - 450 x10*3/uL    Neutrophils % 85.2 40.0 - 80.0 %    Immature Granulocytes %, Automated 0.6 0.0 - 0.9 %    Lymphocytes % 5.5 13.0 - 44.0 %    Monocytes % 7.0 2.0 - 10.0 %    Eosinophils % 1.3 0.0 - 6.0 %    Basophils % 0.4 0.0 - 2.0 %    Neutrophils Absolute 12.01 (H) 1.20 - 7.70 x10*3/uL    Immature Granulocytes Absolute, Automated 0.08 0.00 - 0.70 x10*3/uL    Lymphocytes Absolute 0.77 (L) 1.20 - 4.80 x10*3/uL    Monocytes Absolute 0.99 0.10 - 1.00 x10*3/uL    Eosinophils Absolute 0.19 0.00 - 0.70 x10*3/uL    Basophils Absolute 0.05 0.00 - 0.10 x10*3/uL   Magnesium   Result Value Ref Range    Magnesium 1.50 (L) 1.60 - 3.10 mg/dL   Vancomycin   Result Value Ref Range    Vancomycin 43.9 (H) 10.0 - 20.0 ug/mL   Comprehensive metabolic panel   Result Value Ref Range    Glucose 60 (L) 65 - 99 mg/dL    Sodium 133 133 - 145 mmol/L    Potassium 4.7 3.4 - 5.1 mmol/L    Chloride 88 (L) 97 - 107 mmol/L    Bicarbonate 21 (L) 24 - 31 mmol/L    Urea Nitrogen 35 (H) 8 - 25 mg/dL    Creatinine 7.80 (H) 0.40 - 1.60 mg/dL    eGFR 6 (L) >60 mL/min/1.73m*2    Calcium 7.8 (L) 8.5 - 10.4 mg/dL    Albumin 3.1 (L) 3.5 - 5.0 g/dL    Alkaline Phosphatase 69 35 - 125 U/L    Total Protein 7.2 5.9 - 7.9 g/dL    AST 12 5 - 40 U/L    Bilirubin, Total 0.4 0.1 - 1.2 mg/dL    ALT <5 (L) 5 - 40 U/L    Anion Gap >19 (H) <=19 mmol/L   Phosphorus   Result Value Ref Range    Phosphorus 2.7 2.5 - 4.5 mg/dL   Coagulation Screen   Result Value Ref Range    Protime 14.0 (H) 9.3 - 12.7 seconds    INR 1.4 (H) 0.9 - 1.2    aPTT 32.6 (H) 22.0 - 32.5 seconds   POCT GLUCOSE   Result Value Ref Range    POCT Glucose 77 74 - 99 mg/dL   Troponin T   Result Value Ref  Range    Troponin T, High Sensitivity 71 (HH) <=14 ng/L   Creatine Kinase   Result Value Ref Range    Creatine Kinase 47 24 - 195 U/L   MRSA Surveillance for Vancomycin De-escalation, PCR    Specimen: Anterior Nares; Swab   Result Value Ref Range    MRSA PCR Detected (A) Not Detected   BLOOD GAS VENOUS FULL PANEL   Result Value Ref Range    POCT pH, Venous 7.32 (L) 7.33 - 7.43 pH    POCT pCO2, Venous 43 41 - 51 mm Hg    POCT pO2, Venous 42 35 - 45 mm Hg    POCT SO2, Venous 72 45 - 75 %    POCT Oxy Hemoglobin, Venous 69.4 45.0 - 75.0 %    POCT Hematocrit Calculated, Venous 25.0 (L) 36.0 - 46.0 %    POCT Sodium, Venous 127 (L) 136 - 145 mmol/L    POCT Potassium, Venous 5.7 (H) 3.5 - 5.3 mmol/L    POCT Chloride, Venous 93 (L) 98 - 107 mmol/L    POCT Ionized Calicum, Venous 1.16 1.10 - 1.33 mmol/L    POCT Glucose, Venous 84 74 - 99 mg/dL    POCT Lactate, Venous 0.9 0.4 - 2.0 mmol/L    POCT Base Excess, Venous -3.7 (L) -2.0 - 3.0 mmol/L    POCT HCO3 Calculated, Venous 22.2 22.0 - 26.0 mmol/L    POCT Hemoglobin, Venous 8.4 (L) 12.0 - 16.0 g/dL    POCT Anion Gap, Venous 18.0 10.0 - 25.0 mmol/L    Patient Temperature 37.0 degrees Celsius    FiO2 21 %       Assessment/Plan:    I am currently managing this critically ill patient for the following problems:    Neuro/Psych/Pain Ctrl/Sedation:  #Chronic pain  #Hx seizures  -Neuro checks per protocol  -Patient is A&Ox's 3  -Continue home lyrica and PRN percocet  -PRN dilaudid if unable to take PO  -Continue home keppra  -Monitor CAM-ICU    Respiratory/ENT:  -Currently on RA  -Continuous pulse oximetry, maintain SPO2>92%  -Encourage IS  -OOB to chair    Cardiovascular:  #Septic shock  #Narrow complex tachycardia, likely SVT  #Hx endocarditis with aortic (2020) and mitral (2013/2020) valve replacements  #Hx HTN  -Continuous cardiac monitoring  -Maintain goal MAP>60  -Currently off pressors  -Currently in sinus rhythm  -ECHO ordered  -Monitor and optimize electrolytes, keep K>4,  Mg>2  -Home hydralazine, metoprolol, and clonidine on hold  -PRN IV metoprolol if tachycardia reoccurs  -Continue home Asa and statin    GI:  -Diet: NPO  -GI prophylaxis: none  -Bowel regimen: Miralax PRN    Renal/Volume Status (Intra & Extravascular):  #ESRD on HD  #Hyperkalemia  #AG metabolic acidosis 2/2 lactic acidosis  -Nephrology consulted for urgent dialysis  -Renal function: 35/7.80  -K 6.6>4.7>5.7  -Daily RFP, Mg, Phos  -Replace electrolytes PRN    Endocrine  #Hypoglycemia  -BS 60 this morning  -Monitor blood glucose Q4 while NPO  -Goal BS<180  -Hypoglycemic protocol  -TSH/T4 pending    Infectious Disease:  #Leukocytosis  #Hx MRSA dialysis catheter infection/endocarditis  -Tmax 38.0  -WBC 21.5>14.1  -Continue IV vanco/zosyn (4/28-**)  -BC pending  -ID consulted, appreciate recs  -Tunneled line to be removed today    Heme/Onc:  #Chronic anemia  -HH: 7.9/26.7, appears to be around baseline  -Daily CBC  -Continue home epoetin  -PT/INR 14/1.4 on admission  -Monitor for s/s of bleeding  -Transfuse of Hg <7  -VTE prophylaxis: Heparin SQ    Derm/MSK:  -PT/OT when stable  -ICU skin protocol    Ethics/Code Status:  -DNR/DNI    :  DVT Prophylaxis: Heparin SQ  GI Prophylaxis: none  Bowel Regimen: Miralax PRN  Diet: NPO  CVC: Rt groin trialysis, left subclavian tunneled line to be removed today by WALKER Archuleta: no  Mckinney: no  Restraints: no  Dispo: ICU    Plan discussed with: Dr Chew  Critical Care Time:  60 minutes  Ivory Puente, APRN-CNP  Pulmonary and Critical Care Medicine

## 2024-04-28 NOTE — ED PROVIDER NOTES
HPI   Chief Complaint   Patient presents with    Fever     High fever, chills, thinks she has a blood infection. Hx of many blood infections. 102.7 F at home 1/2 hour ago. Reports taking a couple Asprin, but no Tylenol. Dialysis pt. Last dialysis was yesterday. Symptoms started around Tuesday. Pt reports that it's hard to get a good BP reading on her, dialysis center always has a hard time with it.    Flu Symptoms       48-year-old female with a history of end-stage renal disease, CHF, chronic pain, CAD, and seizures is brought to the emergency department with a chief complaint of flulike symptoms including high fever, chills, thinks she has a blood infection. Hx of many blood infections. 102.7 F at home 1/2 hour ago. Reports taking a couple Asprin, but no Tylenol. Dialysis pt. Last dialysis was yesterday. Symptoms started around Tuesday. Pt reports that it's hard to get a good BP reading on her, dialysis center always has a hard time with it.  She states that she takes clonidine 3 times daily and that if she does not take it she gets the shakes and starts to withdraw.  She does not take this for withdrawal from opioids or alcohol.  She denies history of smoking.  She denies any sick contacts.  Patient took an aspirin in the early afternoon with minimal symptom relief.      History provided by:  Patient and significant other   used: No                        Anderson Coma Scale Score: 15                     Patient History   Past Medical History:   Diagnosis Date    Aortic valve replaced 2022    CHF (congestive heart failure) (Multi)     Chronic pain     Coronary artery disease     Disease of thyroid gland     ESRD (end stage renal disease) (Multi)     H/O mitral valve replacement 2013    and 2022    Heart disease     History of transfusion     Hypertension     Mitral valve regurgitation     Seizures (Multi)     Stroke (Multi)      Past Surgical History:   Procedure Laterality Date    AORTIC VALVE  REPLACEMENT  2020    bioprosthetic    CORONARY ARTERY BYPASS GRAFT      IR CVC TUNNELED  09/09/2022    IR CVC TUNNELED 9/9/2022 [facility] [place] [facility]    IR CVC TUNNELED  12/28/2022    IR CVC TUNNELED 12/28/2022 [facility] [place] [facility]    MITRAL VALVE REPLACEMENT  2013    bioprosthetic    MITRAL VALVE REPLACEMENT  2020    bioprosthetic    PARATHYROIDECTOMY      US GUIDED PERCUTANEOUS PLACEMENT  07/14/2022    US GUIDED PERCUTANEOUS PLACEMENT [facility]     Family History   Problem Relation Name Age of Onset    No Known Problems Mother      No Known Problems Father       Social History     Tobacco Use    Smoking status: Never    Smokeless tobacco: Never   Vaping Use    Vaping status: Never Used   Substance Use Topics    Alcohol use: Never    Drug use: Never       Physical Exam   ED Triage Vitals [04/27/24 2039]   Temperature Heart Rate Respirations BP   37.6 °C (99.7 °F) (!) 108 18 76/60      Pulse Ox Temp Source Heart Rate Source Patient Position   94 % Oral -- Sitting      BP Location FiO2 (%)     Left leg --       Physical Exam  Vitals and nursing note reviewed.   Constitutional:       General: She is not in acute distress.     Appearance: She is well-developed and normal weight. She is ill-appearing.   HENT:      Head: Normocephalic and atraumatic.      Mouth/Throat:      Mouth: Mucous membranes are dry.   Eyes:      Extraocular Movements: Extraocular movements intact.      Conjunctiva/sclera: Conjunctivae normal.      Pupils: Pupils are equal, round, and reactive to light.   Cardiovascular:      Rate and Rhythm: Regular rhythm. Tachycardia present.      Heart sounds: Murmur heard.   Pulmonary:      Effort: Pulmonary effort is normal. No respiratory distress.      Breath sounds: No stridor. Rhonchi and rales present. No wheezing.   Abdominal:      Palpations: Abdomen is soft.      Tenderness: There is no abdominal tenderness.   Musculoskeletal:         General: No swelling.      Cervical back: Neck supple.    Skin:     General: Skin is warm and dry.      Capillary Refill: Capillary refill takes less than 2 seconds.   Neurological:      General: No focal deficit present.      Mental Status: She is alert.   Psychiatric:         Mood and Affect: Mood normal.         ED Course & MDM   ED Course as of 04/28/24 0823   Sat Apr 27, 2024   2300 POTASSIUM(!!): 6.6 [EG]   2306 XR chest 2 views  MPRESSION:  Mild left basilar opacity and mild basilar infiltrate not excluded.   [EG]      ED Course User Index  [EG] Neha Mcleod MD         Diagnoses as of 04/28/24 0823   Sepsis, due to unspecified organism, unspecified whether acute organ dysfunction present (Multi)   End stage renal disease on dialysis (Multi)   Hyperkalemia   Hospital-acquired pneumonia   Hypotension, unspecified hypotension type       Medical Decision Making    HPI:  As Above  PMHx/PSHx/Meds/Allergies/SH/FH as per nursing documentation and reviewed.  Review of systems: Total of 10 systems reviewed and otherwise negative except as noted elsewhere    DDX: As described in MDM    If performed, radiology listed above interpreted by me and confirmed by the Radiologist.  Medications administered during this visit (name and route): see MAR  Social determinants of health considered for this visit: lives at home  If performed, EKG interpreted by me and detailed above    MDM Summary/considerations:  See ED course for interpreted studies.    48-year-old female presenting with fever and tachycardia and concern for sepsis.  Her workup is showing evidence of pneumonia.  She had a recent hospitalization for hyperkalemia and is being treated with IV antibiotics to cover for hospital-acquired pneumonia.  Although the patient has a history of hypotension, she has not had maps greater than 65.  She is alert and talkative.  She is ordered Levophed as aggressive IV fluid hydration could yield to flash pulmonary edema and worsening respiratory status.  Given the hypotension  and meeting SIRS criteria with a focus of hospital-acquired pneumonia and need for dialysis as she has intolerance to Kayexalate and does not produce urine and elimination cannot be safely done without dialysis.  Patient improved while in the emergency department and her pulse upon dispo is less than 90 but continues to have tachypnea.  She will be admitted to the ICU.    The patient was seen and triaged by our nursing/medic staff, their vitals were taken and the staff notes were reviewed.  If the patient arrived by an EMS squad or an outside agency, we discussed the case with transporting EMS medic, police, or other historians. My initial assessment was attention to their airway, breathing, and circulatory status.  We addressed any immediate or life threatening findings and completed a medical history and a physical exam if the patient or those legally responsible were in agreement with this.     **Disclaimer:  This note was dictated by speech recognition technology.  Minor errors in transcription may be present.  Please contact for clarification or corrections.      Amount and/or Complexity of Data Reviewed  External Data Reviewed: labs, radiology, ECG and notes.  Labs: ordered. Decision-making details documented in ED Course.  Radiology: ordered and independent interpretation performed. Decision-making details documented in ED Course.        Procedure  Critical Care    Performed by: Neha Mcleod MD  Authorized by: Neha Mcleod MD    Critical care provider statement:     Critical care time (minutes):  45    Critical care time was exclusive of:  Separately billable procedures and treating other patients    Critical care was necessary to treat or prevent imminent or life-threatening deterioration of the following conditions:  Shock and sepsis    Critical care was time spent personally by me on the following activities:  Development of treatment plan with patient or surrogate, discussions with  consultants, discussions with primary provider, evaluation of patient's response to treatment, examination of patient, obtaining history from patient or surrogate, ordering and performing treatments and interventions, ordering and review of laboratory studies, ordering and review of radiographic studies, pulse oximetry, re-evaluation of patient's condition and review of old charts    Care discussed with: admitting provider         Neha Mcleod MD  04/28/24 8567

## 2024-04-28 NOTE — CONSULTS
"INFECTIOUS DISEASES CONSULTATION NOTE      Referred by SANCHO Haney MD    Reason For Consult  EVAR, septic shock, leukocytosis, presumed recurrent Staph aureus bacteremia    History Of Present Illness  Batsheva Page is a 48 y.o. female with end-stage renal disease on chronic hemodialysis, presenting with several days of chills, fever, and weakness.  I am very well acquainted with her from multiple previous admissions to this hospital.  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  At the last occurrence, in January 2024 in this hospital, there were as such severe limitation of options for vascular access that the tunneled dialysis catheter in the left chest had to be replaced over a wire.  At that time there were extensive conversations with the patient about the advisability of transition to peritoneal dialysis, which she refused.  She was therefore begun on a regimen of \"vancomycin lock\" after each hemodialysis.    5 or 6 days ago she began having her typical symptoms of myalgia, discomfort at the catheter site, fever, weakness, and chills.  For several months she has been complaining of malodor or coming from the dialysis catheter, although she complained of that when she was admitted briefly here earlier this month with negative blood cultures and no sign of active infection at that time.  She ultimately presented to the emergency room last night and was hypotensive, febrile, and had marked leukocytosis.  Cultures were collected and she was started on vancomycin and Zosyn with plans for urgent dialysis because of hyperkalemia     Past Medical History  Aortic and mitral valve replacements  Coronary artery disease   History of seizures  Chronic renal failure     Social History  Does not abuse tobacco or alcohol     Family History  Not pertinent to the " current question     Allergies  Kayexalate, Metoclopramide hcl, Prochlorperazine, Zofran [ondansetron hcl], and Ondansetron      Review of Systems  Detailed review of systems completed.  No significant additional positives beyond what is mentioned above      Physical Exam  Vital signs:  Visit Vitals  BP 86/57   Pulse 93   Temp 36.8 °C (98.2 °F)   Resp 23      General: Appears tired and uncomfortable but not toxic, breathing comfortably on room air  HEENT:  No scleral icterus or conjunctival suffusion, oral mucosa moist  Nodes:  Negative  Chest: Tunneled dialysis catheter in the left subclavian position without erythema, suppuration, or tenderness.  Lungs clear to auscultation  Breasts:  Not examined  Heart:  S1, S2 crisp.  Prominent systolic ejection murmur both at the apex and the base.  No diastolic murmur appreciated  Abdomen:  Soft, nontender. No palpable organs or masses  Back:  No spinal or CVA tenderness  Genitalia:  Not examined  Extremities:  No cords, phlebitis, cellulitis.  Few excoriations in the distal left pretibial skin without cellulitis.  Triple-lumen dialysis catheter in the right groin, peripheral IV in the right antecubital fossa  Neurologic:  Alert.  Grossly non-focal.  No meningismus  Skin: No mucocutaneous signs of infectious endocarditis    Relevant Results  WBC 21,500 ---> 14,100  Hemoglobin: 7.9  Platelets: 191,000  Potassium: 6.6  Creatinine: 7.7  Anion gap: > 19    Results from last 72 hours   Lab Units 04/27/24  2058   AST U/L 17   ALT U/L 5   ALK PHOS U/L 100   BILIRUBIN TOTAL mg/dL 0.4     Microbiology:  Blood (4/27): Pending X2  Imaging:  CXR images personally reviewed: Vague density at the left heart border, doubt pneumonia      ASSESSMENT:  Fever/leukocytosis/history of MRSA dialysis catheter infection  Patient is admitted with fever, leukocytosis, hypotension, likely from recurrent dialysis catheter infection and bacteremia.  Remains at high risk for recurrent infectious  endocarditis, but no overt evidence at the current time.  Extremely limited options for dialysis access, as discussed in detail in multiple previous notes from prior admissions.        PLANS:  -   Empiric therapy with vancomycin and Zosyn  -   TTE  -   Await further observation and incubation of cultures     THANK YOU FOR ASKING ME TO ASSIST YOU AGAIN IN THE CARE OF YOUR PATIENT    Adama Soto MD  [facility] [employer]  Office:  [phone]

## 2024-04-28 NOTE — CARE PLAN
Problem: Pain  Goal: My pain/discomfort is manageable  Outcome: Progressing     Problem: Safety  Goal: Patient will be injury free during hospitalization  Outcome: Progressing  Goal: I will remain free of falls  Outcome: Progressing     Problem: Daily Care  Goal: Daily care needs are met  Outcome: Progressing     Problem: Psychosocial Needs  Goal: Demonstrates ability to cope with hospitalization/illness  Outcome: Progressing  Goal: Collaborate with me, my family, and caregiver to identify my specific goals  Outcome: Progressing     Problem: Discharge Barriers  Goal: My discharge needs are met  Outcome: Progressing     Problem: Skin  Goal: Decreased wound size/increased tissue granulation at next dressing change  Outcome: Progressing  Goal: Participates in plan/prevention/treatment measures  Outcome: Progressing  Goal: Prevent/manage excess moisture  Outcome: Progressing  Goal: Prevent/minimize sheer/friction injuries  Outcome: Progressing  Goal: Promote/optimize nutrition  Outcome: Progressing  Goal: Promote skin healing  Outcome: Progressing     Problem: Pain  Goal: Takes deep breaths with improved pain control throughout the shift  Outcome: Progressing  Goal: Turns in bed with improved pain control throughout the shift  Outcome: Progressing  Goal: Walks with improved pain control throughout the shift  Outcome: Progressing  Goal: Performs ADL's with improved pain control throughout shift  Outcome: Progressing  Goal: Participates in PT with improved pain control throughout the shift  Outcome: Progressing  Goal: Free from opioid side effects throughout the shift  Outcome: Progressing  Goal: Free from acute confusion related to pain meds throughout the shift  Outcome: Progressing     Problem: Fall/Injury  Goal: Not fall by end of shift  Outcome: Progressing  Goal: Be free from injury by end of the shift  Outcome: Progressing  Goal: Verbalize understanding of personal risk factors for fall in the hospital  Outcome:  Progressing  Goal: Verbalize understanding of risk factor reduction measures to prevent injury from fall in the home  Outcome: Progressing  Goal: Use assistive devices by end of the shift  Outcome: Progressing  Goal: Pace activities to prevent fatigue by end of the shift  Outcome: Progressing     Problem: Infection related to problem list condition  Goal: Infection will resolve through treatment  Outcome: Progressing   The patient's goals for the shift include      The clinical goals for the shift include Pt to remain hemodynamically stable    Over the shift, the patient did not make progress toward the following goals. Barriers to progression include pt having low blood pressures. Recommendations to address these barriers include monitor vitals and may have to add in medications.

## 2024-04-28 NOTE — H&P
"Critical Care Medicine:  History & Physical    History of Present Illness     Chief complaint: High grade fevers, chills, generalized weakness,  \"I think I have a blood infection\", hyperkalemia    48 y.o. 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   Patient also complained of generalized myalgias, pain at the cath insertion site and malodor from the cath site. Patient described her outpatient dialysis center as \"filthy\" and noted improper care and access of her HD cath on multiple occasions by the staff there.    ED course:  Patient was hypotensive in the ER 76/36, was administered 250 ml of crystalloids, and a loading dose of vancomycin and zosyn. 2 sets of blood cultures were drawn.    Labs: WBC 21K with Left shift. Creatinine 7, Lactate 2.4, K- 6.6  She was sent to the MICU with a peripheral IV but no central catheters and no vasopressors.  Nephrology consulted for urgent dialysis. Scheduled for am      Relevant Past history:  Patient has a history of recurrent MRSA CVC infections. She was previously treated with IV vancomycin and rifampin.  Patient also had endocarditis of mitral and aortic valves with a Aortic valve replacement in 2022.  Please see detailed note by Dr. Soto.  Currently, patient is being dialyzed through permacath located in the left upper chest.  In the past, treating teams had problems trying to find another vessel suitable for dialysis access.  Unfortunately they had to place permacath in the same location where she had infected catheter after the cultures were negative.       PMH:  Aortic valve replaced (2022), CHF , Chronic pain, Coronary artery disease, ESRD (end stage renal disease),  H/O mitral valve " "replacement (2013), Hypertension, Mitral valve regurgitation, Seizures, and Stroke, Recurrent MRSA sepsis/septicemia the most recent episode in January 2024.    PSH:  IR CVC tunneled (09/09/2022); IR CVC tunneled (12/28/2022); US guided percutaneous placement (07/14/2022); Parathyroidectomy; Coronary artery bypass graft; Mitral valve replacement (2013); Aortic valve replacement (2020); and Mitral valve replacement (2020).  IR CVC tunneled catheter placement in January 2024    SH:  never smoked. She has never used smokeless tobacco. She reports that she does not drink alcohol and does not use drugs.    FH: No relevant history      ROS:  Constitutional:  + chills and fever, + malaise, + decreased activity  Respiratory:  + shortness of breath and wheezing.    Cardiovascular:  Negative for chest pain, palpitations and leg swelling.   Gastrointestinal:  Positive for nausea. Negative for abdominal pain, blood in stool, constipation, diarrhea and vomiting.   Genitourinary:  Negative for hematuria.   Musculoskeletal:  + myalgias. Negative for back pain and neck pain.   Neurological:  Negative for syncope and headaches.     Objective   Previous History     Medical History  As above.    Surgical History  Past Surgical History:   Procedure Laterality Date    AORTIC VALVE REPLACEMENT  2020    bioprosthetic    CORONARY ARTERY BYPASS GRAFT      IR CVC TUNNELED  09/09/2022    IR CVC TUNNELED 9/9/2022 [facility]    IR CVC TUNNELED  12/28/2022    IR CVC TUNNELED 12/28/2022 [facility]    MITRAL VALVE REPLACEMENT  2013    bioprosthetic    MITRAL VALVE REPLACEMENT  2020    bioprosthetic    PARATHYROIDECTOMY      US GUIDED PERCUTANEOUS PLACEMENT  07/14/2022    US GUIDED PERCUTANEOUS PLACEMENT [facility]     Allergies  Allergies   Allergen Reactions    Kayexalate Seizure    Metoclopramide Hcl Other     anxious, agitated, \"skin crawl    Prochlorperazine Other     anxious, agitated, \"skin crawl    Zofran " [Ondansetron Hcl] Headache    Ondansetron Other and Headache       Home Medications  Current Outpatient Medications   Medication Instructions    acetaminophen (Tylenol) 325 mg tablet Take 2 tablets (650 mg) by mouth every 6 hours as needed for mild pain (1 - 3).    aspirin 81 mg, oral, Daily    atorvastatin (LIPITOR) 40 mg, oral, Daily    buPROPion (WELLBUTRIN) 100 mg, oral, Every other day    calcitriol (ROCALTROL) 0.5 mcg, oral, Daily    cloNIDine (CATAPRES) 0.1 mg, oral, Every 8 hours scheduled    epoetin brandy-epbx (RETACRIT) 100 Units/kg, subcutaneous, 3 times weekly    ergocalciferol (Vitamin D-2) 1.25 MG (29740 UT) capsule 1 capsule, oral, Once Weekly    hydrALAZINE (APRESOLINE) 50 mg, oral, 3 times daily    levETIRAcetam (KEPPRA) 500 mg, oral, Nightly    metoprolol succinate XL (Toprol-XL) 50 mg 24 hr tablet TAKE 1 TABLET BY MOUTH TWICE DAILY    oxyCODONE-acetaminophen (Percocet) 5-325 mg tablet Take 1 tablet by mouth every 6 hours as needed for moderate pain (4 - 6).    pregabalin (LYRICA) 50 mg, oral, 2 times daily    promethazine (PHENERGAN) 25 mg, oral, Daily PRN    sodium zirconium cyclosilicate (Lokelma) 10 gram packet DISSOLVE 1 PACKET INTO 45ML OF WATER TAKE DAILY BEFORE DINNER. ADMINSTER OTHER MEDICTIONS AT LEAST 2 HOURS BEFORE OR 2 HOURS AFTER LOKELMA     Social History  Social History    Tobacco Use      Smoking status: Never      Smokeless tobacco: Never      reports no history of alcohol use.    reports no history of drug use.     Family History  family history includes No Known Problems in her father and mother.    Objective     Vitals:    04/27/24 2039   Weight: 63.7 kg (140 lb 6.9 oz)   Body mass index is 21.35 kg/m².        4/5/2024     3:00 PM 4/5/2024     7:37 PM 4/6/2024    12:24 AM 4/6/2024     4:39 AM 4/6/2024     7:25 AM 4/27/2024     8:39 PM 4/27/2024    11:45 PM   Vitals   Systolic 112 102 109 117 126 76 75   Diastolic 58 58 56 62 71 60 34   Heart Rate 71 66  55 54 108 101   Temp 37.1  "°C (98.8 °F) 36.8 °C (98.2 °F) 36.6 °C (97.9 °F) 36.5 °C (97.7 °F) 36.6 °C (97.9 °F) 37.6 °C (99.7 °F)    Resp 16 18 16 16 16 18    Height (in)      1.727 m (5' 8\")    Weight (lb)      140.43    BMI      21.35 kg/m2    BSA (m2)      1.75 m2         Vent settings:     No intake or output data in the 24 hours ending 04/28/24 0126    Physical Examination  --------------------------  Cooperative with exam.  Nontoxic-appearing.  Comfortable at rest on room air.  Skin is clean, dry.  No skin lesions, rashes, ecchymosis.  Head is atraumatic, normocephalic.  Mouth mucosa is pink and moist.  No mucosal lesions.  No nasal discharge.  Musculoskeletal: No deformities, no muscle swelling.  No point tenderness to palpation.  Neck is supple, no JVD, no carotid bruits.  No lymphadenopathy.  Chest is atraumatic.  No tenderness to palpation.  There is a dialysis catheter in the left upper chest, no surrounding erythema, no crepitus, no tenderness to palpation.  Lungs are clear to auscultation bilaterally.  No wheezes, no rales, no rhonchi.  Heart: Regular rate and rhythm S1-S2.  Systolic murmur peripheral pulses are palpable in all extremities, equal.  Abdomen is soft, not tender, not distended.  Bowel sounds positive in all quadrants.  No rebound, no guarding.  Full exam deferred.  Extremities: No peripheral edema, cords, cyanosis, varices.  Neuro: Patient is alert, oriented x3.  Moves all extremities.  No gross focal neurological deficits.  Face is symmetrical.  Tongue is midline.  No visual abnormalities.  No dysarthria or aphasia.  Psychiatric: Patient is cooperative with exam, maintains good eye contact.  No evidence of psychosis, suicidal ideation or depression.     Medications     Scheduled Medications:   acetaminophen, 975 mg, oral, Once  vancomycin, 1.5 g, intravenous, Once       Continuous Medications:   norepinephrine, 0.01-0.5 mcg/kg/min (Ideal)       PRN Medications:     Labs     Results from last 72 hours   Lab Units " "04/27/24 2058   GLUCOSE mg/dL 99   SODIUM mmol/L 128*   POTASSIUM mmol/L 6.6*   CHLORIDE mmol/L 83*   CO2 mmol/L 24   BUN mg/dL 31*   CREATININE mg/dL 7.70*   EGFR mL/min/1.73m*2 6*   CALCIUM mg/dL 8.5   ALBUMIN g/dL 4.1     Results from last 72 hours   Lab Units 04/27/24 2058   ALK PHOS U/L 100   ALT U/L 5   AST U/L 17   BILIRUBIN TOTAL mg/dL 0.4   PROTEIN TOTAL g/dL 9.0*             Results from last 72 hours   Lab Units 04/27/24 2058   WBC AUTO x10*3/uL 21.5*   NRBC AUTO /100 WBCs 0.0   RBC AUTO x10*6/uL 3.72*   HEMOGLOBIN g/dL 9.6*   HEMATOCRIT % 32.4*   MCV fL 87   MCH pg 25.8*   MCHC g/dL 29.6*   RDW % 18.9*   PLATELETS AUTO x10*3/uL 253         Lab Results   Component Value Date    BLOODCULT No growth at 4 days -  FINAL REPORT 04/04/2024    GRAMSTAIN Gram positive cocci, clusters (AA) 01/09/2024    GRAMSTAIN Gram positive cocci, clusters (AA) 01/09/2024     No results found for: \"URINECULTURE\"    Imaging and Diagnostic Studies     Lab/Radiology/Diagnostic Review:  Results for orders placed or performed during the hospital encounter of 04/27/24 (from the past 24 hour(s))   CBC and Auto Differential   Result Value Ref Range    WBC 21.5 (H) 4.4 - 11.3 x10*3/uL    nRBC 0.0 0.0 - 0.0 /100 WBCs    RBC 3.72 (L) 4.00 - 5.20 x10*6/uL    Hemoglobin 9.6 (L) 12.0 - 16.0 g/dL    Hematocrit 32.4 (L) 36.0 - 46.0 %    MCV 87 80 - 100 fL    MCH 25.8 (L) 26.0 - 34.0 pg    MCHC 29.6 (L) 32.0 - 36.0 g/dL    RDW 18.9 (H) 11.5 - 14.5 %    Platelets 253 150 - 450 x10*3/uL    Neutrophils % 91.6 40.0 - 80.0 %    Immature Granulocytes %, Automated 0.6 0.0 - 0.9 %    Lymphocytes % 3.1 13.0 - 44.0 %    Monocytes % 4.0 2.0 - 10.0 %    Eosinophils % 0.4 0.0 - 6.0 %    Basophils % 0.3 0.0 - 2.0 %    Neutrophils Absolute 19.74 (H) 1.20 - 7.70 x10*3/uL    Immature Granulocytes Absolute, Automated 0.13 0.00 - 0.70 x10*3/uL    Lymphocytes Absolute 0.66 (L) 1.20 - 4.80 x10*3/uL    Monocytes Absolute 0.87 0.10 - 1.00 x10*3/uL    Eosinophils " Absolute 0.08 0.00 - 0.70 x10*3/uL    Basophils Absolute 0.06 0.00 - 0.10 x10*3/uL   Comprehensive metabolic panel   Result Value Ref Range    Glucose 99 65 - 99 mg/dL    Sodium 128 (L) 133 - 145 mmol/L    Potassium 6.6 (HH) 3.4 - 5.1 mmol/L    Chloride 83 (L) 97 - 107 mmol/L    Bicarbonate 24 24 - 31 mmol/L    Urea Nitrogen 31 (H) 8 - 25 mg/dL    Creatinine 7.70 (H) 0.40 - 1.60 mg/dL    eGFR 6 (L) >60 mL/min/1.73m*2    Calcium 8.5 8.5 - 10.4 mg/dL    Albumin 4.1 3.5 - 5.0 g/dL    Alkaline Phosphatase 100 35 - 125 U/L    Total Protein 9.0 (H) 5.9 - 7.9 g/dL    AST 17 5 - 40 U/L    Bilirubin, Total 0.4 0.1 - 1.2 mg/dL    ALT 5 5 - 40 U/L    Anion Gap >19 (H) <=19 mmol/L   Blood Gas Lactic Acid, Venous   Result Value Ref Range    POCT Lactate, Venous 2.5 (H) 0.4 - 2.0 mmol/L   Sars-CoV-2 and Influenza A/B PCR   Result Value Ref Range    Flu A Result Not Detected Not Detected    Flu B Result Not Detected Not Detected    Coronavirus 2019, PCR Not Detected Not Detected   Blood Gas Lactic Acid, Venous   Result Value Ref Range    POCT Lactate, Venous 2.2 (H) 0.4 - 2.0 mmol/L     XR chest 2 views    Result Date: 4/27/2024  Interpreted By:  Shane Townsend, STUDY: XR CHEST 2 VIEWS;  4/27/2024 8:54 pm   INDICATION: Signs/Symptoms:fever.   COMPARISON: 1/8/2024   ACCESSION NUMBER(S): [id number]   ORDERING CLINICIAN: LEIGH ANN SILVA   FINDINGS: Postsurgical change with sternotomy wires and cardiac valve prosthesis. There is left central venous catheter tip terminates at the level of the right atrium. The cardiac silhouette is stable in size. Mild left basilar opacity in basilar infiltrate not excluded. No significant pleural effusion. No pneumothorax.       Mild left basilar opacity and mild basilar infiltrate not excluded.   MACRO: None   Signed by: Shane Townsend 4/27/2024 9:20 PM Dictation workstation:   [id number]    ECG 12 lead    Result Date: 4/4/2024  Suspect arm lead reversal, interpretation assumes no reversal Normal sinus  rhythm Left posterior fascicular block Abnormal ECG When compared with ECG of 08-JAN-2024 06:56, Premature supraventricular complexes are no longer Present Left posterior fascicular block is now Present T wave inversion now evident in Lateral leads Confirmed by Chava Zaidi ([id number]) on 4/4/2024 10:45:46 AM          Assessment / Plan     48 y.o. 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      PROBLEM LIST:  ------------------------  - Septic shock sec to gram positive blood stream infection.  - Severe hyperkalemia needing urgent dialysis  - gram positive bacteremia  - ESRD with lt tunneled HD cath   - At risk for hemodynamic instability needing close ICU monitoring  - Lactic acidosis  - Pain management  - Subclavian stenosis and poor vascular access needing IR on previous admissions.      MANAGEMENT PLAN:  -----------------------------  - Needs medical management of hyperkalemia, received calcium gluconate, insulin, dextrose and lokelma  - Repeat CMP in 2 hours.  - POCUS exam : EF normal, hyperdynamic LV, thickened Mitral and aortic annulus, mild AR, no pericardial effusion, IVC Dmax 1.7 mm, Dmin 6 mm, >50% collapse with inspiration.  - Will bolus 1 L NS and reassess.  - Will place Trialysis cath in the right femoral site.   - Nephrology consulted for urgent HD  - IR consulted for tunneled CVC removal  - ID consult with Dr Soto for continuity of care  - Echocardiogram TTE, but will likely need LAURA if hemodynamics do not improve or with persistent bacteremia.  - Keep MAPS> 65  - Continue to renally dose vancomycin and continue Zosyn  - IV Dilaudid 0.2 q4h as needed for pain (7-10), percocet for mild to mod pain.    I have personally  interviewed and examined the patient.  I have personally verified elements of the exam listed above. Interval changes or irregularities are as noted.  I have personally and independently reviewed laboratory, radiographic and procedural data.  I have personally reviewed the problem list above and concur. Changes, if any, are noted.  I have personally reviewed the plan list above and concur. Changes, if any, are noted.    The patient has high probability of compromise including but not limited to organ system failure, mechanical respiratory and circulatory support, cardiac arrest and death. I have discussed this in detail with the family / caregivers.    I have personally spent 45 minutes of face to face critical care time (not including billable procedures billed separately) in taking care of the patient.  I have personally answered all questions to the satisfaction of the patient and / or family members to their satisfaction.        Lane Haney MD    This patient is critically ill/injured due to acute impairment in one or more vital organ systems, such that there is a probably of imminent or life-threatening deterioration of the patient's condition.  I spent 45 minutes in the direct delivery of medical care that involves high complexity decision making to treat single or multiple vital organ system failure and/or prevent further life-threatening deterioration of the patient's condition.  This time does not include separately billable procedures.

## 2024-04-28 NOTE — SIGNIFICANT EVENT
Pt asked for a call to be made to Chava [phone] and give an update of what was going on.  States Chava is a friend in which she lives with, and is okay with us telling him what is going on at the moment.

## 2024-04-28 NOTE — CONSULTS
.Reason For Consult  End-stage kidney disease and dialysis therapy    History Of Present Illness  Batsheva Page is a 48 y.o. female who is very well-known to my practice she has end-stage kidney disease she is on dialysis on Monday Wednesday Friday, she is known to have multiple episodes of bacteremia with MRSA fact she had a history of endocarditis in the past she has no vascular access left except for the permacath the patient had had seen and evaluated by multiple vascular surgeons in the past and no success in creating an AV fistula or graft patient recently had MRSA bacteremia from infected line that was cured with antibiotic therapy and changing the dialysis catheter and some affect we have been using vancomycin catheter lock for this patient apparently the patient presented again to the emergency room with severe fever chills nausea not feeling well patient was admitted to the intensive care unit because of hypotension and sepsis a right femoral dialysis catheter was placed by the intensivist overnight and there was an order from the intensivist for IR to remove the tunneled cath dialysis catheter she is awake and responsive     Review of Systems  Review of Systems   Constitutional:  Positive for appetite change, chills, fatigue and fever.   HENT:  Negative for sinus pressure, sore throat and tinnitus.    Eyes:  Negative for photophobia and visual disturbance.   Respiratory:  Negative for apnea, cough, shortness of breath and wheezing.    Cardiovascular:  Negative for chest pain, palpitations and leg swelling.   Gastrointestinal:  Positive for nausea. Negative for abdominal pain, blood in stool, constipation, diarrhea, rectal pain and vomiting.   Endocrine: Negative for cold intolerance, heat intolerance, polydipsia, polyphagia and polyuria.   Genitourinary:  Negative for decreased urine volume, dysuria, frequency, hematuria and urgency.   Musculoskeletal:  Negative for arthralgias, back pain, joint  swelling, myalgias and neck pain.   Skin:  Negative for color change, pallor, rash and wound.   Neurological:  Negative for dizziness, tremors, seizures, syncope, speech difficulty, weakness, light-headedness, numbness and headaches.   Hematological:  Negative for adenopathy. Does not bruise/bleed easily.   Psychiatric/Behavioral:  Negative for confusion, hallucinations, sleep disturbance and suicidal ideas.         Past Medical History  She has a past medical history of Aortic valve replaced (2022), CHF (congestive heart failure) (Multi), Chronic pain, Coronary artery disease, Disease of thyroid gland, ESRD (end stage renal disease) (Multi), H/O mitral valve replacement (2013), Heart disease, History of transfusion, Hypertension, Mitral valve regurgitation, Seizures (Multi), and Stroke (Multi).    Surgical History  She has a past surgical history that includes IR CVC tunneled (09/09/2022); IR CVC tunneled (12/28/2022); US guided percutaneous placement (07/14/2022); Parathyroidectomy; Coronary artery bypass graft; Mitral valve replacement (2013); Aortic valve replacement (2020); and Mitral valve replacement (2020).     Social History  She reports that she has never smoked. She has never used smokeless tobacco. She reports that she does not drink alcohol and does not use drugs.    Family History  Family History   Problem Relation Name Age of Onset    No Known Problems Mother      No Known Problems Father          Current Facility-Administered Medications:     acetaminophen (Tylenol) tablet 650 mg, 650 mg, oral, q6h PRN, Alex E Zachs, PA-C    aspirin EC tablet 81 mg, 81 mg, oral, Daily, Alxe E Zachs, PA-C    atorvastatin (Lipitor) tablet 40 mg, 40 mg, oral, Nightly, Alex E Hipps, PA-C    buPROPion (Wellbutrin) tablet 100 mg, 100 mg, oral, Every other day, Alex Alcocer, PA-C    calcitriol (Rocaltrol) capsule 0.5 mcg, 0.5 mcg, oral, Daily, Alex Alcocer, PA-C    dextrose 50 % injection 12.5 g, 12.5 g, intravenous, q15  min PRN, ANGELA Zarate    dextrose 50 % injection 25 g, 25 g, intravenous, q15 min PRN, ANGELA Zarate    diphenhydrAMINE (BENADryl) injection 25 mg, 25 mg, intravenous, Once, Zhou Pedersen MD    [START ON 4/29/2024] epoetin brandy-epbx (RETACRIT) injection 6,500 Units, 6,500 Units, subcutaneous, Once per day on Monday Wednesday Friday, Alex Alcocer PA-C    ergocalciferol (Vitamin D-2) capsule 1,250 mcg, 1,250 mcg, oral, Every Sunday, Alex Alcocer PA-C    glucagon (Glucagen) injection 1 mg, 1 mg, intramuscular, q15 min PRN, ANGELA Zarate    glucagon (Glucagen) injection 1 mg, 1 mg, intramuscular, q15 min PRN, ANGELA Zarate    heparin (porcine) injection 5,000 Units, 5,000 Units, subcutaneous, q8h KIMBERLY, Alex Alcocer PA-C    HYDROmorphone (Dilaudid) injection 0.2 mg, 0.2 mg, intravenous, q3h PRN, Lane Haney MD, 0.2 mg at 04/28/24 1151    levETIRAcetam (Keppra) tablet 500 mg, 500 mg, oral, Nightly, Alex Alcocer PA-C    norepinephrine (Levophed) 8 mg in dextrose 5% 250 mL (0.032 mg/mL) infusion (premix), 0.01-0.5 mcg/kg/min (Ideal), intravenous, Continuous, Alex Alcocer PA-C, Stopped at 04/28/24 0644    oxyCODONE-acetaminophen (Percocet) 5-325 mg per tablet 1 tablet, 1 tablet, oral, q6h PRN, Alex Alcocer PA-C    piperacillin-tazobactam-dextrose (Zosyn) IV 2.25 g, 2.25 g, intravenous, q8h, Alex Alcocer PA-C, Stopped at 04/28/24 0636    polyethylene glycol (Glycolax, Miralax) packet 17 g, 17 g, oral, Daily PRN, ANGELA Zarate    pregabalin (Lyrica) capsule 50 mg, 50 mg, oral, BID, Alex Alcocer PA-C    promethazine (Phenergan) tablet 25 mg, 25 mg, oral, Daily PRN, ROSA Jacinto-AIYANA    sodium zirconium cyclosilicate (Lokelma) packet 10 g, 10 g, oral, q8h, Alex Alcocer PA-C    vancomycin (Vancocin) pharmacy to dose - pharmacy monitoring, , miscellaneous, Daily PRN, ROSA Jacinto-AIYANA   Allergies  Kayexalate, Metoclopramide hcl, Prochlorperazine,  "Zofran [ondansetron hcl], and Ondansetron         Physical Exam  Physical Exam  Constitutional:       General: She is not in acute distress.     Appearance: She is not toxic-appearing.   HENT:      Head: Normocephalic and atraumatic.   Eyes:      Extraocular Movements: Extraocular movements intact.      Pupils: Pupils are equal, round, and reactive to light.   Neck:      Vascular: No carotid bruit.   Cardiovascular:      Rate and Rhythm: Normal rate and regular rhythm.      Heart sounds: Murmur heard.   Pulmonary:      Effort: No respiratory distress.      Breath sounds: No stridor. No wheezing, rhonchi or rales.   Chest:      Chest wall: No tenderness.   Abdominal:      General: There is no distension.      Palpations: There is no mass.      Tenderness: There is no abdominal tenderness. There is no right CVA tenderness, left CVA tenderness or guarding.      Hernia: No hernia is present.   Musculoskeletal:         General: No swelling or tenderness.      Cervical back: No rigidity.      Right lower leg: No edema.      Left lower leg: No edema.   Lymphadenopathy:      Cervical: No cervical adenopathy.   Skin:     General: Skin is warm and dry.      Coloration: Skin is not jaundiced or pale.      Findings: No bruising or erythema.   Neurological:      General: No focal deficit present.      Mental Status: She is alert and oriented to person, place, and time.   Psychiatric:         Mood and Affect: Mood normal.         Behavior: Behavior normal.              I&O 24HR    Intake/Output Summary (Last 24 hours) at 4/28/2024 1303  Last data filed at 4/28/2024 0909  Gross per 24 hour   Intake 154.16 ml   Output --   Net 154.16 ml       Vitals 24HR  Heart Rate:  []   Temp:  [36.8 °C (98.2 °F)-38.4 °C (101.1 °F)]   Resp:  [16-38]   BP: (58-98)/(25-62)   Height:  [172.7 cm (5' 8\")]   Weight:  [63.7 kg (140 lb 6.9 oz)-70.6 kg (155 lb 10.3 oz)]   SpO2:  [58 %-97 %]     Relevant Results        Results for orders placed or " performed during the hospital encounter of 04/27/24 (from the past 96 hour(s))   CBC and Auto Differential   Result Value Ref Range    WBC 21.5 (H) 4.4 - 11.3 x10*3/uL    nRBC 0.0 0.0 - 0.0 /100 WBCs    RBC 3.72 (L) 4.00 - 5.20 x10*6/uL    Hemoglobin 9.6 (L) 12.0 - 16.0 g/dL    Hematocrit 32.4 (L) 36.0 - 46.0 %    MCV 87 80 - 100 fL    MCH 25.8 (L) 26.0 - 34.0 pg    MCHC 29.6 (L) 32.0 - 36.0 g/dL    RDW 18.9 (H) 11.5 - 14.5 %    Platelets 253 150 - 450 x10*3/uL    Neutrophils % 91.6 40.0 - 80.0 %    Immature Granulocytes %, Automated 0.6 0.0 - 0.9 %    Lymphocytes % 3.1 13.0 - 44.0 %    Monocytes % 4.0 2.0 - 10.0 %    Eosinophils % 0.4 0.0 - 6.0 %    Basophils % 0.3 0.0 - 2.0 %    Neutrophils Absolute 19.74 (H) 1.20 - 7.70 x10*3/uL    Immature Granulocytes Absolute, Automated 0.13 0.00 - 0.70 x10*3/uL    Lymphocytes Absolute 0.66 (L) 1.20 - 4.80 x10*3/uL    Monocytes Absolute 0.87 0.10 - 1.00 x10*3/uL    Eosinophils Absolute 0.08 0.00 - 0.70 x10*3/uL    Basophils Absolute 0.06 0.00 - 0.10 x10*3/uL   Comprehensive metabolic panel   Result Value Ref Range    Glucose 99 65 - 99 mg/dL    Sodium 128 (L) 133 - 145 mmol/L    Potassium 6.6 (HH) 3.4 - 5.1 mmol/L    Chloride 83 (L) 97 - 107 mmol/L    Bicarbonate 24 24 - 31 mmol/L    Urea Nitrogen 31 (H) 8 - 25 mg/dL    Creatinine 7.70 (H) 0.40 - 1.60 mg/dL    eGFR 6 (L) >60 mL/min/1.73m*2    Calcium 8.5 8.5 - 10.4 mg/dL    Albumin 4.1 3.5 - 5.0 g/dL    Alkaline Phosphatase 100 35 - 125 U/L    Total Protein 9.0 (H) 5.9 - 7.9 g/dL    AST 17 5 - 40 U/L    Bilirubin, Total 0.4 0.1 - 1.2 mg/dL    ALT 5 5 - 40 U/L    Anion Gap >19 (H) <=19 mmol/L   Blood Gas Lactic Acid, Venous   Result Value Ref Range    POCT Lactate, Venous 2.5 (H) 0.4 - 2.0 mmol/L   Sars-CoV-2 and Influenza A/B PCR   Result Value Ref Range    Flu A Result Not Detected Not Detected    Flu B Result Not Detected Not Detected    Coronavirus 2019, PCR Not Detected Not Detected   Blood Gas Lactic Acid, Venous   Result  Value Ref Range    POCT Lactate, Venous 2.2 (H) 0.4 - 2.0 mmol/L   POCT GLUCOSE   Result Value Ref Range    POCT Glucose 119 (H) 74 - 99 mg/dL   CBC and Auto Differential   Result Value Ref Range    WBC 14.1 (H) 4.4 - 11.3 x10*3/uL    nRBC 0.0 0.0 - 0.0 /100 WBCs    RBC 3.05 (L) 4.00 - 5.20 x10*6/uL    Hemoglobin 7.9 (L) 12.0 - 16.0 g/dL    Hematocrit 26.7 (L) 36.0 - 46.0 %    MCV 88 80 - 100 fL    MCH 25.9 (L) 26.0 - 34.0 pg    MCHC 29.6 (L) 32.0 - 36.0 g/dL    RDW 19.0 (H) 11.5 - 14.5 %    Platelets 191 150 - 450 x10*3/uL    Neutrophils % 85.2 40.0 - 80.0 %    Immature Granulocytes %, Automated 0.6 0.0 - 0.9 %    Lymphocytes % 5.5 13.0 - 44.0 %    Monocytes % 7.0 2.0 - 10.0 %    Eosinophils % 1.3 0.0 - 6.0 %    Basophils % 0.4 0.0 - 2.0 %    Neutrophils Absolute 12.01 (H) 1.20 - 7.70 x10*3/uL    Immature Granulocytes Absolute, Automated 0.08 0.00 - 0.70 x10*3/uL    Lymphocytes Absolute 0.77 (L) 1.20 - 4.80 x10*3/uL    Monocytes Absolute 0.99 0.10 - 1.00 x10*3/uL    Eosinophils Absolute 0.19 0.00 - 0.70 x10*3/uL    Basophils Absolute 0.05 0.00 - 0.10 x10*3/uL   Magnesium   Result Value Ref Range    Magnesium 1.50 (L) 1.60 - 3.10 mg/dL   Vancomycin   Result Value Ref Range    Vancomycin 43.9 (H) 10.0 - 20.0 ug/mL   Comprehensive metabolic panel   Result Value Ref Range    Glucose 60 (L) 65 - 99 mg/dL    Sodium 133 133 - 145 mmol/L    Potassium 4.7 3.4 - 5.1 mmol/L    Chloride 88 (L) 97 - 107 mmol/L    Bicarbonate 21 (L) 24 - 31 mmol/L    Urea Nitrogen 35 (H) 8 - 25 mg/dL    Creatinine 7.80 (H) 0.40 - 1.60 mg/dL    eGFR 6 (L) >60 mL/min/1.73m*2    Calcium 7.8 (L) 8.5 - 10.4 mg/dL    Albumin 3.1 (L) 3.5 - 5.0 g/dL    Alkaline Phosphatase 69 35 - 125 U/L    Total Protein 7.2 5.9 - 7.9 g/dL    AST 12 5 - 40 U/L    Bilirubin, Total 0.4 0.1 - 1.2 mg/dL    ALT <5 (L) 5 - 40 U/L    Anion Gap >19 (H) <=19 mmol/L   Phosphorus   Result Value Ref Range    Phosphorus 2.7 2.5 - 4.5 mg/dL   Coagulation Screen   Result Value Ref  Range    Protime 14.0 (H) 9.3 - 12.7 seconds    INR 1.4 (H) 0.9 - 1.2    aPTT 32.6 (H) 22.0 - 32.5 seconds   POCT GLUCOSE   Result Value Ref Range    POCT Glucose 77 74 - 99 mg/dL   Troponin T   Result Value Ref Range    Troponin T, High Sensitivity 71 (HH) <=14 ng/L   Creatine Kinase   Result Value Ref Range    Creatine Kinase 47 24 - 195 U/L   MRSA Surveillance for Vancomycin De-escalation, PCR    Specimen: Anterior Nares; Swab   Result Value Ref Range    MRSA PCR Detected (A) Not Detected   BLOOD GAS VENOUS FULL PANEL   Result Value Ref Range    POCT pH, Venous 7.32 (L) 7.33 - 7.43 pH    POCT pCO2, Venous 43 41 - 51 mm Hg    POCT pO2, Venous 42 35 - 45 mm Hg    POCT SO2, Venous 72 45 - 75 %    POCT Oxy Hemoglobin, Venous 69.4 45.0 - 75.0 %    POCT Hematocrit Calculated, Venous 25.0 (L) 36.0 - 46.0 %    POCT Sodium, Venous 127 (L) 136 - 145 mmol/L    POCT Potassium, Venous 5.7 (H) 3.5 - 5.3 mmol/L    POCT Chloride, Venous 93 (L) 98 - 107 mmol/L    POCT Ionized Calicum, Venous 1.16 1.10 - 1.33 mmol/L    POCT Glucose, Venous 84 74 - 99 mg/dL    POCT Lactate, Venous 0.9 0.4 - 2.0 mmol/L    POCT Base Excess, Venous -3.7 (L) -2.0 - 3.0 mmol/L    POCT HCO3 Calculated, Venous 22.2 22.0 - 26.0 mmol/L    POCT Hemoglobin, Venous 8.4 (L) 12.0 - 16.0 g/dL    POCT Anion Gap, Venous 18.0 10.0 - 25.0 mmol/L    Patient Temperature 37.0 degrees Celsius    FiO2 21 %   Magnesium   Result Value Ref Range    Magnesium 2.40 1.60 - 3.10 mg/dL   Basic metabolic panel   Result Value Ref Range    Glucose 90 65 - 99 mg/dL    Sodium 133 133 - 145 mmol/L    Potassium 5.7 (H) 3.4 - 5.1 mmol/L    Chloride 87 (L) 97 - 107 mmol/L    Bicarbonate 20 (L) 24 - 31 mmol/L    Urea Nitrogen 40 (H) 8 - 25 mg/dL    Creatinine 8.40 (H) 0.40 - 1.60 mg/dL    eGFR 5 (L) >60 mL/min/1.73m*2    Calcium 8.9 8.5 - 10.4 mg/dL    Anion Gap >19 (H) <=19 mmol/L   TSH   Result Value Ref Range    Thyroid Stimulating Hormone 2.12 0.27 - 4.20 mIU/L   T4, free   Result  Value Ref Range    Thyroxine, Free 0.90 0.90 - 1.70 ng/dL   POCT GLUCOSE   Result Value Ref Range    POCT Glucose 83 74 - 99 mg/dL        XR chest 2 views    Result Date: 4/27/2024  Interpreted By:  Shane Townsend, STUDY: XR CHEST 2 VIEWS;  4/27/2024 8:54 pm   INDICATION: Signs/Symptoms:fever.   COMPARISON: 1/8/2024   ACCESSION NUMBER(S): [id number]   ORDERING CLINICIAN: LEIGH ANN SILVA   FINDINGS: Postsurgical change with sternotomy wires and cardiac valve prosthesis. There is left central venous catheter tip terminates at the level of the right atrium. The cardiac silhouette is stable in size. Mild left basilar opacity in basilar infiltrate not excluded. No significant pleural effusion. No pneumothorax.       Mild left basilar opacity and mild basilar infiltrate not excluded.   MACRO: None   Signed by: Shane Townsend 4/27/2024 9:20 PM Dictation workstation:   [id number]     Impression:  End Stage kidney disease  Hyperkalemia  Septic shock  Most likely line infection in her dialysis catheter  Anemia of chronic kidney disease  Hypertension  Aortic and mitral valve replacements    Recommendations:  Agree with IV Zosyn and vancomycin  ID consult  Dialyze the patient today because of hyperkalemia  Discussed with infectious disease in details patient basically has very poor veins and even placing a new dialysis catheter in the IJ or subclavian veins is extremely difficult the only way we can get into the vein and place another dialysis catheter per IR recommendations is change the catheter through a guidewire we will reevaluate the clinical condition and iron replacement tomorrow morning hopefully the blood cultures results will be back  Discussed the case in details with the intensivist and the  Infectious disease    I spent 45 minutes in the professional and overall care of this patient.      Zhou Pedersen MDInpatient consult to Nephrology  Consult performed by: Zhou Pedersen MD  Consult ordered by: Alex Alcocer,  GELACIO

## 2024-04-29 ENCOUNTER — APPOINTMENT (OUTPATIENT)
Dept: CARDIOLOGY | Facility: HOSPITAL | Age: 49
DRG: 314 | End: 2024-04-29
Payer: MEDICARE

## 2024-04-29 LAB
ALBUMIN SERPL-MCNC: 3.3 G/DL (ref 3.5–5)
ANION GAP SERPL CALC-SCNC: 13 MMOL/L
ANION GAP SERPL CALC-SCNC: 18 MMOL/L
AORTIC VALVE MEAN GRADIENT: 27 MMHG
AORTIC VALVE PEAK VELOCITY: 3.56 M/S
AV PEAK GRADIENT: 50.7 MMHG
BASOPHILS # BLD AUTO: 0.02 X10*3/UL (ref 0–0.1)
BASOPHILS # BLD AUTO: 0.03 X10*3/UL (ref 0–0.1)
BASOPHILS NFR BLD AUTO: 0.3 %
BASOPHILS NFR BLD AUTO: 0.4 %
BUN SERPL-MCNC: 13 MG/DL (ref 8–25)
BUN SERPL-MCNC: 19 MG/DL (ref 8–25)
CALCIUM SERPL-MCNC: 8.3 MG/DL (ref 8.5–10.4)
CALCIUM SERPL-MCNC: 9 MG/DL (ref 8.5–10.4)
CHLORIDE SERPL-SCNC: 92 MMOL/L (ref 97–107)
CHLORIDE SERPL-SCNC: 94 MMOL/L (ref 97–107)
CO2 SERPL-SCNC: 22 MMOL/L (ref 24–31)
CO2 SERPL-SCNC: 25 MMOL/L (ref 24–31)
CREAT SERPL-MCNC: 3.2 MG/DL (ref 0.4–1.6)
CREAT SERPL-MCNC: 4 MG/DL (ref 0.4–1.6)
EGFRCR SERPLBLD CKD-EPI 2021: 13 ML/MIN/1.73M*2
EGFRCR SERPLBLD CKD-EPI 2021: 17 ML/MIN/1.73M*2
EJECTION FRACTION APICAL 4 CHAMBER: 59.6
EOSINOPHIL # BLD AUTO: 0 X10*3/UL (ref 0–0.7)
EOSINOPHIL # BLD AUTO: 0.03 X10*3/UL (ref 0–0.7)
EOSINOPHIL NFR BLD AUTO: 0 %
EOSINOPHIL NFR BLD AUTO: 0.4 %
ERYTHROCYTE [DISTWIDTH] IN BLOOD BY AUTOMATED COUNT: 18.7 % (ref 11.5–14.5)
ERYTHROCYTE [DISTWIDTH] IN BLOOD BY AUTOMATED COUNT: 18.9 % (ref 11.5–14.5)
GLUCOSE BLD MANUAL STRIP-MCNC: 109 MG/DL (ref 74–99)
GLUCOSE BLD MANUAL STRIP-MCNC: 111 MG/DL (ref 74–99)
GLUCOSE BLD MANUAL STRIP-MCNC: 113 MG/DL (ref 74–99)
GLUCOSE BLD MANUAL STRIP-MCNC: 126 MG/DL (ref 74–99)
GLUCOSE BLD MANUAL STRIP-MCNC: 129 MG/DL (ref 74–99)
GLUCOSE BLD MANUAL STRIP-MCNC: 88 MG/DL (ref 74–99)
GLUCOSE BLD MANUAL STRIP-MCNC: 97 MG/DL (ref 74–99)
GLUCOSE SERPL-MCNC: 110 MG/DL (ref 65–99)
GLUCOSE SERPL-MCNC: 130 MG/DL (ref 65–99)
HCT VFR BLD AUTO: 24.9 % (ref 36–46)
HCT VFR BLD AUTO: 26.7 % (ref 36–46)
HGB BLD-MCNC: 7.4 G/DL (ref 12–16)
HGB BLD-MCNC: 8 G/DL (ref 12–16)
IMM GRANULOCYTES # BLD AUTO: 0.02 X10*3/UL (ref 0–0.7)
IMM GRANULOCYTES # BLD AUTO: 0.02 X10*3/UL (ref 0–0.7)
IMM GRANULOCYTES NFR BLD AUTO: 0.3 % (ref 0–0.9)
IMM GRANULOCYTES NFR BLD AUTO: 0.3 % (ref 0–0.9)
LEFT ATRIUM VOLUME AREA LENGTH INDEX BSA: 26.2 ML/M2
LEFT VENTRICLE INTERNAL DIMENSION DIASTOLE: 3.93 CM (ref 3.5–6)
LV EJECTION FRACTION BIPLANE: 62 %
LYMPHOCYTES # BLD AUTO: 0.43 X10*3/UL (ref 1.2–4.8)
LYMPHOCYTES # BLD AUTO: 0.66 X10*3/UL (ref 1.2–4.8)
LYMPHOCYTES NFR BLD AUTO: 6.5 %
LYMPHOCYTES NFR BLD AUTO: 9.4 %
MAGNESIUM SERPL-MCNC: 1.9 MG/DL (ref 1.6–3.1)
MAGNESIUM SERPL-MCNC: 2.4 MG/DL (ref 1.6–3.1)
MCH RBC QN AUTO: 25.5 PG (ref 26–34)
MCH RBC QN AUTO: 25.7 PG (ref 26–34)
MCHC RBC AUTO-ENTMCNC: 29.7 G/DL (ref 32–36)
MCHC RBC AUTO-ENTMCNC: 30 G/DL (ref 32–36)
MCV RBC AUTO: 85 FL (ref 80–100)
MCV RBC AUTO: 87 FL (ref 80–100)
MITRAL VALVE E/A RATIO: 0.99
MONOCYTES # BLD AUTO: 0.51 X10*3/UL (ref 0.1–1)
MONOCYTES # BLD AUTO: 0.81 X10*3/UL (ref 0.1–1)
MONOCYTES NFR BLD AUTO: 11.6 %
MONOCYTES NFR BLD AUTO: 7.7 %
NEUTROPHILS # BLD AUTO: 5.45 X10*3/UL (ref 1.2–7.7)
NEUTROPHILS # BLD AUTO: 5.66 X10*3/UL (ref 1.2–7.7)
NEUTROPHILS NFR BLD AUTO: 77.9 %
NEUTROPHILS NFR BLD AUTO: 85.2 %
NRBC BLD-RTO: 0 /100 WBCS (ref 0–0)
NRBC BLD-RTO: 0 /100 WBCS (ref 0–0)
PHOSPHATE SERPL-MCNC: 2.8 MG/DL (ref 2.5–4.5)
PHOSPHATE SERPL-MCNC: 4.3 MG/DL (ref 2.5–4.5)
PLATELET # BLD AUTO: 145 X10*3/UL (ref 150–450)
PLATELET # BLD AUTO: 151 X10*3/UL (ref 150–450)
POTASSIUM SERPL-SCNC: 3.7 MMOL/L (ref 3.4–5.1)
POTASSIUM SERPL-SCNC: 4.3 MMOL/L (ref 3.4–5.1)
RBC # BLD AUTO: 2.88 X10*6/UL (ref 4–5.2)
RBC # BLD AUTO: 3.14 X10*6/UL (ref 4–5.2)
RIGHT VENTRICLE FREE WALL PEAK S': 12.5 CM/S
RIGHT VENTRICLE PEAK SYSTOLIC PRESSURE: 29.6 MMHG
SODIUM SERPL-SCNC: 132 MMOL/L (ref 133–145)
SODIUM SERPL-SCNC: 132 MMOL/L (ref 133–145)
TRICUSPID ANNULAR PLANE SYSTOLIC EXCURSION: 1.8 CM
VANCOMYCIN SERPL-MCNC: 17.1 UG/ML (ref 10–20)
WBC # BLD AUTO: 6.6 X10*3/UL (ref 4.4–11.3)
WBC # BLD AUTO: 7 X10*3/UL (ref 4.4–11.3)

## 2024-04-29 PROCEDURE — 36415 COLL VENOUS BLD VENIPUNCTURE: CPT

## 2024-04-29 PROCEDURE — 93306 TTE W/DOPPLER COMPLETE: CPT

## 2024-04-29 PROCEDURE — 93005 ELECTROCARDIOGRAM TRACING: CPT

## 2024-04-29 PROCEDURE — 93306 TTE W/DOPPLER COMPLETE: CPT | Performed by: INTERNAL MEDICINE

## 2024-04-29 PROCEDURE — 85025 COMPLETE CBC W/AUTO DIFF WBC: CPT

## 2024-04-29 PROCEDURE — 80048 BASIC METABOLIC PNL TOTAL CA: CPT

## 2024-04-29 PROCEDURE — 2500000004 HC RX 250 GENERAL PHARMACY W/ HCPCS (ALT 636 FOR OP/ED): Performed by: STUDENT IN AN ORGANIZED HEALTH CARE EDUCATION/TRAINING PROGRAM

## 2024-04-29 PROCEDURE — 82947 ASSAY GLUCOSE BLOOD QUANT: CPT

## 2024-04-29 PROCEDURE — 83735 ASSAY OF MAGNESIUM: CPT | Mod: 91

## 2024-04-29 PROCEDURE — 83735 ASSAY OF MAGNESIUM: CPT

## 2024-04-29 PROCEDURE — 82947 ASSAY GLUCOSE BLOOD QUANT: CPT | Mod: 91,MUE

## 2024-04-29 PROCEDURE — 6350000001 HC RX 635 EPOETIN >10,000 UNITS

## 2024-04-29 PROCEDURE — 2500000004 HC RX 250 GENERAL PHARMACY W/ HCPCS (ALT 636 FOR OP/ED): Performed by: INTERNAL MEDICINE

## 2024-04-29 PROCEDURE — 85025 COMPLETE CBC W/AUTO DIFF WBC: CPT | Mod: 91

## 2024-04-29 PROCEDURE — 2500000001 HC RX 250 WO HCPCS SELF ADMINISTERED DRUGS (ALT 637 FOR MEDICARE OP)

## 2024-04-29 PROCEDURE — 2500000005 HC RX 250 GENERAL PHARMACY W/O HCPCS

## 2024-04-29 PROCEDURE — 2500000004 HC RX 250 GENERAL PHARMACY W/ HCPCS (ALT 636 FOR OP/ED): Mod: JZ

## 2024-04-29 PROCEDURE — 99291 CRITICAL CARE FIRST HOUR: CPT

## 2024-04-29 PROCEDURE — 84100 ASSAY OF PHOSPHORUS: CPT

## 2024-04-29 PROCEDURE — 2020000001 HC ICU ROOM DAILY

## 2024-04-29 PROCEDURE — 2500000002 HC RX 250 W HCPCS SELF ADMINISTERED DRUGS (ALT 637 FOR MEDICARE OP, ALT 636 FOR OP/ED)

## 2024-04-29 PROCEDURE — 80202 ASSAY OF VANCOMYCIN: CPT | Performed by: INTERNAL MEDICINE

## 2024-04-29 PROCEDURE — 36415 COLL VENOUS BLD VENIPUNCTURE: CPT | Performed by: INTERNAL MEDICINE

## 2024-04-29 PROCEDURE — 2500000004 HC RX 250 GENERAL PHARMACY W/ HCPCS (ALT 636 FOR OP/ED)

## 2024-04-29 PROCEDURE — 80048 BASIC METABOLIC PNL TOTAL CA: CPT | Mod: CCI

## 2024-04-29 RX ORDER — METOPROLOL TARTRATE 1 MG/ML
10 INJECTION, SOLUTION INTRAVENOUS ONCE
Status: COMPLETED | OUTPATIENT
Start: 2024-04-29 | End: 2024-04-29

## 2024-04-29 RX ORDER — MAGNESIUM SULFATE 1 G/100ML
1 INJECTION INTRAVENOUS ONCE
Status: COMPLETED | OUTPATIENT
Start: 2024-04-29 | End: 2024-04-29

## 2024-04-29 RX ORDER — CALCIUM GLUCONATE 20 MG/ML
2 INJECTION, SOLUTION INTRAVENOUS ONCE
Status: COMPLETED | OUTPATIENT
Start: 2024-04-29 | End: 2024-04-29

## 2024-04-29 RX ORDER — DIPHENHYDRAMINE HYDROCHLORIDE 50 MG/ML
25 INJECTION INTRAMUSCULAR; INTRAVENOUS EVERY 6 HOURS PRN
Status: DISCONTINUED | OUTPATIENT
Start: 2024-04-29 | End: 2024-05-03

## 2024-04-29 RX ORDER — VANCOMYCIN HYDROCHLORIDE 1 G/20ML
INJECTION, POWDER, LYOPHILIZED, FOR SOLUTION INTRAVENOUS DAILY PRN
Status: DISCONTINUED | OUTPATIENT
Start: 2024-04-29 | End: 2024-05-08 | Stop reason: HOSPADM

## 2024-04-29 RX ORDER — METHOCARBAMOL 500 MG/1
500 TABLET, FILM COATED ORAL ONCE
Status: COMPLETED | OUTPATIENT
Start: 2024-04-29 | End: 2024-04-29

## 2024-04-29 RX ADMIN — PROMETHAZINE HYDROCHLORIDE 25 MG: 25 TABLET ORAL at 18:09

## 2024-04-29 RX ADMIN — PIPERACILLIN SODIUM AND TAZOBACTAM SODIUM 2.25 G: 2; .25 INJECTION, SOLUTION INTRAVENOUS at 21:00

## 2024-04-29 RX ADMIN — METHOCARBAMOL 500 MG: 500 TABLET ORAL at 04:28

## 2024-04-29 RX ADMIN — HEPARIN SODIUM 5000 UNITS: 5000 INJECTION, SOLUTION INTRAVENOUS; SUBCUTANEOUS at 21:00

## 2024-04-29 RX ADMIN — HYDROMORPHONE HYDROCHLORIDE 0.2 MG: 1 INJECTION, SOLUTION INTRAMUSCULAR; INTRAVENOUS; SUBCUTANEOUS at 14:15

## 2024-04-29 RX ADMIN — HYDROMORPHONE HYDROCHLORIDE 0.2 MG: 1 INJECTION, SOLUTION INTRAMUSCULAR; INTRAVENOUS; SUBCUTANEOUS at 00:17

## 2024-04-29 RX ADMIN — EPOETIN ALFA-EPBX 6500 UNITS: 20000 INJECTION, SOLUTION INTRAVENOUS; SUBCUTANEOUS at 09:00

## 2024-04-29 RX ADMIN — CALCITRIOL CAPSULES 0.25 MCG 0.5 MCG: 0.25 CAPSULE ORAL at 09:42

## 2024-04-29 RX ADMIN — DIPHENHYDRAMINE HYDROCHLORIDE 25 MG: 50 INJECTION, SOLUTION INTRAMUSCULAR; INTRAVENOUS at 16:06

## 2024-04-29 RX ADMIN — PREGABALIN 50 MG: 50 CAPSULE ORAL at 20:35

## 2024-04-29 RX ADMIN — PREGABALIN 50 MG: 50 CAPSULE ORAL at 09:44

## 2024-04-29 RX ADMIN — ASPIRIN 81 MG: 81 TABLET, COATED ORAL at 09:44

## 2024-04-29 RX ADMIN — CALCIUM GLUCONATE 2 G: 20 INJECTION, SOLUTION INTRAVENOUS at 06:23

## 2024-04-29 RX ADMIN — LEVETIRACETAM 500 MG: 5 INJECTION INTRAVENOUS at 20:35

## 2024-04-29 RX ADMIN — PIPERACILLIN SODIUM AND TAZOBACTAM SODIUM 2.25 G: 2; .25 INJECTION, SOLUTION INTRAVENOUS at 05:22

## 2024-04-29 RX ADMIN — DIPHENHYDRAMINE HYDROCHLORIDE 25 MG: 50 INJECTION, SOLUTION INTRAMUSCULAR; INTRAVENOUS at 09:43

## 2024-04-29 RX ADMIN — HEPARIN SODIUM 2100 UNITS: 1000 INJECTION INTRAVENOUS; SUBCUTANEOUS at 00:24

## 2024-04-29 RX ADMIN — PROMETHAZINE HYDROCHLORIDE 12.5 MG: 25 INJECTION INTRAMUSCULAR; INTRAVENOUS at 10:28

## 2024-04-29 RX ADMIN — HYDROMORPHONE HYDROCHLORIDE 0.2 MG: 1 INJECTION, SOLUTION INTRAMUSCULAR; INTRAVENOUS; SUBCUTANEOUS at 17:27

## 2024-04-29 RX ADMIN — ATORVASTATIN CALCIUM 40 MG: 40 TABLET, FILM COATED ORAL at 20:35

## 2024-04-29 RX ADMIN — HYDROMORPHONE HYDROCHLORIDE 0.2 MG: 1 INJECTION, SOLUTION INTRAMUSCULAR; INTRAVENOUS; SUBCUTANEOUS at 03:23

## 2024-04-29 RX ADMIN — MAGNESIUM SULFATE HEPTAHYDRATE 1 G: 1 INJECTION, SOLUTION INTRAVENOUS at 03:38

## 2024-04-29 RX ADMIN — METOPROLOL TARTRATE 10 MG: 5 INJECTION INTRAVENOUS at 05:57

## 2024-04-29 RX ADMIN — HEPARIN SODIUM 2100 UNITS: 1000 INJECTION INTRAVENOUS; SUBCUTANEOUS at 00:27

## 2024-04-29 RX ADMIN — OXYCODONE AND ACETAMINOPHEN 1 TABLET: 325; 5 TABLET ORAL at 02:27

## 2024-04-29 RX ADMIN — HYDROMORPHONE HYDROCHLORIDE 0.2 MG: 1 INJECTION, SOLUTION INTRAMUSCULAR; INTRAVENOUS; SUBCUTANEOUS at 07:50

## 2024-04-29 RX ADMIN — HYDROMORPHONE HYDROCHLORIDE 0.2 MG: 1 INJECTION, SOLUTION INTRAMUSCULAR; INTRAVENOUS; SUBCUTANEOUS at 11:07

## 2024-04-29 RX ADMIN — PIPERACILLIN SODIUM AND TAZOBACTAM SODIUM 2.25 G: 2; .25 INJECTION, SOLUTION INTRAVENOUS at 14:09

## 2024-04-29 RX ADMIN — HEPARIN SODIUM 1500 UNITS: 1000 INJECTION INTRAVENOUS; SUBCUTANEOUS at 08:14

## 2024-04-29 SDOH — ECONOMIC STABILITY: INCOME INSECURITY: IN THE LAST 12 MONTHS, WAS THERE A TIME WHEN YOU WERE NOT ABLE TO PAY THE MORTGAGE OR RENT ON TIME?: NO

## 2024-04-29 SDOH — ECONOMIC STABILITY: TRANSPORTATION INSECURITY
IN THE PAST 12 MONTHS, HAS LACK OF TRANSPORTATION KEPT YOU FROM MEETINGS, WORK, OR FROM GETTING THINGS NEEDED FOR DAILY LIVING?: NO

## 2024-04-29 SDOH — SOCIAL STABILITY: SOCIAL INSECURITY: WITHIN THE LAST YEAR, HAVE YOU BEEN HUMILIATED OR EMOTIONALLY ABUSED IN OTHER WAYS BY YOUR PARTNER OR EX-PARTNER?: NO

## 2024-04-29 SDOH — SOCIAL STABILITY: SOCIAL INSECURITY: WITHIN THE LAST YEAR, HAVE YOU BEEN AFRAID OF YOUR PARTNER OR EX-PARTNER?: NO

## 2024-04-29 SDOH — SOCIAL STABILITY: SOCIAL NETWORK: HOW OFTEN DO YOU ATTENT MEETINGS OF THE CLUB OR ORGANIZATION YOU BELONG TO?: NEVER

## 2024-04-29 SDOH — HEALTH STABILITY: MENTAL HEALTH: HOW MANY STANDARD DRINKS CONTAINING ALCOHOL DO YOU HAVE ON A TYPICAL DAY?: PATIENT DOES NOT DRINK

## 2024-04-29 SDOH — ECONOMIC STABILITY: FOOD INSECURITY: WITHIN THE PAST 12 MONTHS, YOU WORRIED THAT YOUR FOOD WOULD RUN OUT BEFORE YOU GOT MONEY TO BUY MORE.: NEVER TRUE

## 2024-04-29 SDOH — ECONOMIC STABILITY: HOUSING INSECURITY
IN THE LAST 12 MONTHS, WAS THERE A TIME WHEN YOU DID NOT HAVE A STEADY PLACE TO SLEEP OR SLEPT IN A SHELTER (INCLUDING NOW)?: NO

## 2024-04-29 SDOH — ECONOMIC STABILITY: FOOD INSECURITY: WITHIN THE PAST 12 MONTHS, THE FOOD YOU BOUGHT JUST DIDN'T LAST AND YOU DIDN'T HAVE MONEY TO GET MORE.: NEVER TRUE

## 2024-04-29 SDOH — ECONOMIC STABILITY: TRANSPORTATION INSECURITY
IN THE PAST 12 MONTHS, HAS THE LACK OF TRANSPORTATION KEPT YOU FROM MEDICAL APPOINTMENTS OR FROM GETTING MEDICATIONS?: NO

## 2024-04-29 SDOH — SOCIAL STABILITY: SOCIAL NETWORK: ARE YOU MARRIED, WIDOWED, DIVORCED, SEPARATED, NEVER MARRIED, OR LIVING WITH A PARTNER?: DIVORCED

## 2024-04-29 SDOH — SOCIAL STABILITY: SOCIAL NETWORK: HOW OFTEN DO YOU ATTEND CHURCH OR RELIGIOUS SERVICES?: MORE THAN 4 TIMES PER YEAR

## 2024-04-29 SDOH — ECONOMIC STABILITY: HOUSING INSECURITY: IN THE LAST 12 MONTHS, HOW MANY PLACES HAVE YOU LIVED?: 1

## 2024-04-29 SDOH — HEALTH STABILITY: MENTAL HEALTH: HOW OFTEN DO YOU HAVE 6 OR MORE DRINKS ON ONE OCCASION?: NEVER

## 2024-04-29 SDOH — ECONOMIC STABILITY: INCOME INSECURITY: HOW HARD IS IT FOR YOU TO PAY FOR THE VERY BASICS LIKE FOOD, HOUSING, MEDICAL CARE, AND HEATING?: NOT VERY HARD

## 2024-04-29 SDOH — HEALTH STABILITY: MENTAL HEALTH: HOW OFTEN DO YOU HAVE A DRINK CONTAINING ALCOHOL?: NEVER

## 2024-04-29 SDOH — HEALTH STABILITY: PHYSICAL HEALTH: ON AVERAGE, HOW MANY MINUTES DO YOU ENGAGE IN EXERCISE AT THIS LEVEL?: 0 MIN

## 2024-04-29 SDOH — ECONOMIC STABILITY: INCOME INSECURITY: IN THE PAST 12 MONTHS, HAS THE ELECTRIC, GAS, OIL, OR WATER COMPANY THREATENED TO SHUT OFF SERVICE IN YOUR HOME?: NO

## 2024-04-29 SDOH — HEALTH STABILITY: MENTAL HEALTH
HOW OFTEN DO YOU NEED TO HAVE SOMEONE HELP YOU WHEN YOU READ INSTRUCTIONS, PAMPHLETS, OR OTHER WRITTEN MATERIAL FROM YOUR DOCTOR OR PHARMACY?: NEVER

## 2024-04-29 SDOH — SOCIAL STABILITY: SOCIAL NETWORK: HOW OFTEN DO YOU GET TOGETHER WITH FRIENDS OR RELATIVES?: MORE THAN THREE TIMES A WEEK

## 2024-04-29 SDOH — ECONOMIC STABILITY: INCOME INSECURITY: HOW HARD IS IT FOR YOU TO PAY FOR THE VERY BASICS LIKE FOOD, HOUSING, MEDICAL CARE, AND HEATING?: NOT HARD AT ALL

## 2024-04-29 SDOH — HEALTH STABILITY: PHYSICAL HEALTH: ON AVERAGE, HOW MANY DAYS PER WEEK DO YOU ENGAGE IN MODERATE TO STRENUOUS EXERCISE (LIKE A BRISK WALK)?: 0 DAYS

## 2024-04-29 ASSESSMENT — PAIN - FUNCTIONAL ASSESSMENT

## 2024-04-29 ASSESSMENT — LIFESTYLE VARIABLES
AUDIT-C TOTAL SCORE: 0
SKIP TO QUESTIONS 9-10: 1

## 2024-04-29 ASSESSMENT — PAIN DESCRIPTION - DESCRIPTORS
DESCRIPTORS: ACHING

## 2024-04-29 ASSESSMENT — PAIN DESCRIPTION - LOCATION
LOCATION: GENERALIZED

## 2024-04-29 ASSESSMENT — PAIN SCALES - GENERAL
PAINLEVEL_OUTOF10: 8
PAINLEVEL_OUTOF10: 10 - WORST POSSIBLE PAIN
PAINLEVEL_OUTOF10: 6
PAINLEVEL_OUTOF10: 2
PAINLEVEL_OUTOF10: 10 - WORST POSSIBLE PAIN
PAINLEVEL_OUTOF10: 6
PAINLEVEL_OUTOF10: 7
PAINLEVEL_OUTOF10: 6
PAINLEVEL_OUTOF10: 8
PAINLEVEL_OUTOF10: 7
PAINLEVEL_OUTOF10: 10 - WORST POSSIBLE PAIN
PAINLEVEL_OUTOF10: 7
PAINLEVEL_OUTOF10: 6
PAINLEVEL_OUTOF10: 10 - WORST POSSIBLE PAIN
PAINLEVEL_OUTOF10: 7
PAINLEVEL_OUTOF10: 10 - WORST POSSIBLE PAIN
PAINLEVEL_OUTOF10: 0 - NO PAIN
PAINLEVEL_OUTOF10: 8

## 2024-04-29 ASSESSMENT — PAIN DESCRIPTION - ORIENTATION
ORIENTATION: RIGHT;LEFT
ORIENTATION: RIGHT;LEFT;OTHER (COMMENT)
ORIENTATION: RIGHT;LEFT

## 2024-04-29 ASSESSMENT — ACTIVITIES OF DAILY LIVING (ADL): LACK_OF_TRANSPORTATION: NO

## 2024-04-29 ASSESSMENT — PAIN SCALES - WONG BAKER: WONGBAKER_NUMERICALRESPONSE: NO HURT

## 2024-04-29 NOTE — PROGRESS NOTES
"INFECTIOUS DISEASES PROGRESS NOTE    Consulted / following patient for:  Fever, sepsis, presumed dialysis catheter infection    Subjective   Interval History:   Patient states she still feels \"terrible,\" with myalgias, chills and intermittent fever.  Denies dyspnea or abdominal pain     Objective   PHYSICAL EXAMINATION  Vital signs:  Visit Vitals  BP (!) 141/102   Pulse 69   Temp 36.4 °C (97.5 °F) (Temporal)   Resp 14      General: Looks less toxic than she did on 4/28  HEENT:  No scleral icterus or conjunctival suffusion, oral mucosa moist  Nodes:  Negative  Chest: Tunneled dialysis catheter in the left subclavian position without erythema, suppuration, or tenderness.  Lungs clear to auscultation  Heart:  S1, S2 crisp.  Prominent systolic ejection murmur both at the apex and the base.  No diastolic murmur appreciated  Abdomen:  Soft, nontender. No palpable organs or masses  Extremities:  No cords, phlebitis, cellulitis.  Few excoriations in the distal left pretibial skin without cellulitis.  Triple-lumen dialysis catheter in the right groin  Neurologic:  Alert.  Grossly non-focal.   Skin: No mucocutaneous signs of infectious endocarditis    Relevant Results  WBC 21,500 ---> 14,100 ---> 6600  Hemoglobin: 7.9 ---> 7.4  Platelets: 191,000 ---> 145,000    Results from last 72 hours   Lab Units 04/28/24  0507   AST U/L 12   ALT U/L <5*   ALK PHOS U/L 69   BILIRUBIN TOTAL mg/dL 0.4     Microbiology:  Blood (4/27): Pending X2  Imaging:  CXR images personally reviewed: Vague density at the left heart border, doubt pneumonia  TTE: Pending        ASSESSMENT:  Fever/leukocytosis/history of MRSA dialysis catheter infection  Patient is admitted with fever, leukocytosis, hypotension, likely from recurrent dialysis catheter infection and bacteremia.  Remains at high risk for recurrent infectious endocarditis, but no overt evidence at the current time.  Extremely limited options for dialysis access, as discussed in detail in " multiple previous notes from prior admissions.  Temperature curve has improved, leukocytosis has resolved, and although she says she feels no better, she certainly looks better overall than she did 24 hours ago.  Awaiting further incubation of cultures.  Discussed in detail on 4/28 with Dr. LIBIA Pedersen        PLANS:  -   Empiric therapy with vancomycin and Zosyn  -   Await TTE  -   Await further observation and incubation of cultures    Adama Soto MD  [facility]  Office:  [phone]

## 2024-04-29 NOTE — NURSING NOTE
On rounding, R chest tunneled catheter dressing is current, dry and intact; decision to come regarding removal as a possible source of infection.  R femoral trialysis catheter dressing is current, dry and intact; pigtail flushes easily with brisk blood return; observed ICU RN drawing blood from the line. Infusion in progress was resumed with no acute issues reported or observed.

## 2024-04-29 NOTE — PROGRESS NOTES
"Batsheva Page is a 48 y.o. female on day 1 of admission presenting with Sepsis, due to unspecified organism, unspecified whether acute organ dysfunction present (Multi).    Subjective   Patient is admitted to hospital with fever chills 1 out of 4 blood cultures  is positive for gram-positive cocci the patient clinically is stable and she feels about the same no further fever or chills       Objective     Physical Exam  Neck:      Vascular: No carotid bruit.   Cardiovascular:      Rate and Rhythm: Normal rate and regular rhythm.      Heart sounds: Murmur heard.      No friction rub. No gallop.   Pulmonary:      Breath sounds: No wheezing, rhonchi or rales.   Chest:      Chest wall: No tenderness.   Abdominal:      General: There is no distension.      Tenderness: There is no abdominal tenderness. There is no guarding or rebound.   Musculoskeletal:         General: No swelling or tenderness.      Cervical back: Neck supple.      Right lower leg: No edema.      Left lower leg: No edema.   Lymphadenopathy:      Cervical: No cervical adenopathy.         Last Recorded Vitals  Blood pressure (!) 97/32, pulse 95, temperature 36.4 °C (97.5 °F), temperature source Temporal, resp. rate 18, height 1.727 m (5' 8\"), weight 68.1 kg (150 lb 2.1 oz), SpO2 98%.    Intake/Output last 3 Shifts:  I/O last 3 completed shifts:  In: 454.2 (6.6 mL/kg) [I.V.:54.2 (0.8 mL/kg); IV Piggyback:400]  Out: - (0 mL/kg)   Weight: 68.7 kg     Current Facility-Administered Medications:     acetaminophen (Tylenol) tablet 650 mg, 650 mg, oral, q6h PRN, Alex E Hipps, PA-C    aspirin EC tablet 81 mg, 81 mg, oral, Daily, Alex E Hipps, PA-C, 81 mg at 04/29/24 0944    atorvastatin (Lipitor) tablet 40 mg, 40 mg, oral, Nightly, Alex E Hipps, PA-C    buPROPion (Wellbutrin) tablet 100 mg, 100 mg, oral, Every other day, Alex E Hipps, PA-C    calcitriol (Rocaltrol) capsule 0.5 mcg, 0.5 mcg, oral, Daily, Alex E Hipps, PA-C, 0.5 mcg at 04/29/24 0942    " dextrose 50 % injection 12.5 g, 12.5 g, intravenous, q15 min PRN, ORI Zarate-CNP    dextrose 50 % injection 25 g, 25 g, intravenous, q15 min PRN, ORI Zarate-SAL    diphenhydrAMINE (BENADryl) injection 25 mg, 25 mg, intravenous, q6h PRN, ANGELA Zarate, 25 mg at 04/29/24 0943    diphenhydrAMINE (BENADryl) injection 50 mg, 50 mg, intravenous, Once, Zhou Pedersen MD    epoetin brandy-epbx (RETACRIT) injection 6,500 Units, 6,500 Units, subcutaneous, Once per day on Monday Wednesday Friday, Alex Alcocer PA-C, 6,500 Units at 04/29/24 0900    ergocalciferol (Vitamin D-2) capsule 1,250 mcg, 1,250 mcg, oral, Every Sunday, Alex Alcocer PA-C    glucagon (Glucagen) injection 1 mg, 1 mg, intramuscular, q15 min PRN, ANGELA Zarate    glucagon (Glucagen) injection 1 mg, 1 mg, intramuscular, q15 min PRN, ANGELA Zarate    heparin (porcine) injection 5,000 Units, 5,000 Units, subcutaneous, q8h KIMBERLY, Alex Alcocer PA-C    heparin 1,000 unit/mL injection 1,000 Units, 1,000 Units, intra-catheter, After Dialysis, Zhou Pedersen MD, 2,100 Units at 04/29/24 0027    heparin 1,000 unit/mL injection 1,000 Units, 1,000 Units, intra-catheter, After Dialysis, Zhou Pedersen MD, 1,500 Units at 04/29/24 0814    HYDROmorphone (Dilaudid) injection 0.2 mg, 0.2 mg, intravenous, q3h PRN, Lane Haney MD, 0.2 mg at 04/29/24 1107    levETIRAcetam in NaCl (iso-os) (Keppra)  mg, 500 mg, intravenous, Nightly, Alex Alcocer PA-C, Stopped at 04/28/24 2351    oxyCODONE-acetaminophen (Percocet) 5-325 mg per tablet 1 tablet, 1 tablet, oral, q6h PRN, Alex Alcocer PA-C, 1 tablet at 04/29/24 0227    piperacillin-tazobactam-dextrose (Zosyn) IV 2.25 g, 2.25 g, intravenous, q8h, Alex Alcocer PA-C, Stopped at 04/29/24 0552    polyethylene glycol (Glycolax, Miralax) packet 17 g, 17 g, oral, Daily PRN, ORI Zarate-CNP    pregabalin (Lyrica) capsule 50 mg, 50 mg, oral, BID, Alex Alcocer,  GELACIO, 50 mg at 04/29/24 0944    promethazine (Phenergan) 12.5 mg in sodium chloride 0.9% 50 mL IV, 12.5 mg, intravenous, q6h PRN, ORI Zarate-CNP, 12.5 mg at 04/29/24 1028    promethazine (Phenergan) tablet 25 mg, 25 mg, oral, Daily PRN, Alex Alcocer PA-C    sodium zirconium cyclosilicate (Lokelma) packet 10 g, 10 g, oral, q8h, Alex Alcocer PA-C    vancomycin (Vancocin) pharmacy to dose - pharmacy monitoring, , miscellaneous, Daily PRN, Adama Soto MD   Relevant Results    Results for orders placed or performed during the hospital encounter of 04/27/24 (from the past 96 hour(s))   CBC and Auto Differential   Result Value Ref Range    WBC 21.5 (H) 4.4 - 11.3 x10*3/uL    nRBC 0.0 0.0 - 0.0 /100 WBCs    RBC 3.72 (L) 4.00 - 5.20 x10*6/uL    Hemoglobin 9.6 (L) 12.0 - 16.0 g/dL    Hematocrit 32.4 (L) 36.0 - 46.0 %    MCV 87 80 - 100 fL    MCH 25.8 (L) 26.0 - 34.0 pg    MCHC 29.6 (L) 32.0 - 36.0 g/dL    RDW 18.9 (H) 11.5 - 14.5 %    Platelets 253 150 - 450 x10*3/uL    Neutrophils % 91.6 40.0 - 80.0 %    Immature Granulocytes %, Automated 0.6 0.0 - 0.9 %    Lymphocytes % 3.1 13.0 - 44.0 %    Monocytes % 4.0 2.0 - 10.0 %    Eosinophils % 0.4 0.0 - 6.0 %    Basophils % 0.3 0.0 - 2.0 %    Neutrophils Absolute 19.74 (H) 1.20 - 7.70 x10*3/uL    Immature Granulocytes Absolute, Automated 0.13 0.00 - 0.70 x10*3/uL    Lymphocytes Absolute 0.66 (L) 1.20 - 4.80 x10*3/uL    Monocytes Absolute 0.87 0.10 - 1.00 x10*3/uL    Eosinophils Absolute 0.08 0.00 - 0.70 x10*3/uL    Basophils Absolute 0.06 0.00 - 0.10 x10*3/uL   Comprehensive metabolic panel   Result Value Ref Range    Glucose 99 65 - 99 mg/dL    Sodium 128 (L) 133 - 145 mmol/L    Potassium 6.6 (HH) 3.4 - 5.1 mmol/L    Chloride 83 (L) 97 - 107 mmol/L    Bicarbonate 24 24 - 31 mmol/L    Urea Nitrogen 31 (H) 8 - 25 mg/dL    Creatinine 7.70 (H) 0.40 - 1.60 mg/dL    eGFR 6 (L) >60 mL/min/1.73m*2    Calcium 8.5 8.5 - 10.4 mg/dL    Albumin 4.1 3.5 - 5.0 g/dL    Alkaline  Phosphatase 100 35 - 125 U/L    Total Protein 9.0 (H) 5.9 - 7.9 g/dL    AST 17 5 - 40 U/L    Bilirubin, Total 0.4 0.1 - 1.2 mg/dL    ALT 5 5 - 40 U/L    Anion Gap >19 (H) <=19 mmol/L   Blood Culture    Specimen: Peripheral Venipuncture; Blood culture   Result Value Ref Range    Blood Culture       Identification and susceptibility testing to follow    Gram Stain Gram positive cocci, clusters (AA)    Blood Gas Lactic Acid, Venous   Result Value Ref Range    POCT Lactate, Venous 2.5 (H) 0.4 - 2.0 mmol/L   Blood Culture    Specimen: Peripheral Venipuncture; Blood culture   Result Value Ref Range    Blood Culture       Identification and susceptibility testing to follow    Gram Stain Gram positive cocci, clusters (AA)    Sars-CoV-2 and Influenza A/B PCR   Result Value Ref Range    Flu A Result Not Detected Not Detected    Flu B Result Not Detected Not Detected    Coronavirus 2019, PCR Not Detected Not Detected   Blood Gas Lactic Acid, Venous   Result Value Ref Range    POCT Lactate, Venous 2.2 (H) 0.4 - 2.0 mmol/L   POCT GLUCOSE   Result Value Ref Range    POCT Glucose 119 (H) 74 - 99 mg/dL   CBC and Auto Differential   Result Value Ref Range    WBC 14.1 (H) 4.4 - 11.3 x10*3/uL    nRBC 0.0 0.0 - 0.0 /100 WBCs    RBC 3.05 (L) 4.00 - 5.20 x10*6/uL    Hemoglobin 7.9 (L) 12.0 - 16.0 g/dL    Hematocrit 26.7 (L) 36.0 - 46.0 %    MCV 88 80 - 100 fL    MCH 25.9 (L) 26.0 - 34.0 pg    MCHC 29.6 (L) 32.0 - 36.0 g/dL    RDW 19.0 (H) 11.5 - 14.5 %    Platelets 191 150 - 450 x10*3/uL    Neutrophils % 85.2 40.0 - 80.0 %    Immature Granulocytes %, Automated 0.6 0.0 - 0.9 %    Lymphocytes % 5.5 13.0 - 44.0 %    Monocytes % 7.0 2.0 - 10.0 %    Eosinophils % 1.3 0.0 - 6.0 %    Basophils % 0.4 0.0 - 2.0 %    Neutrophils Absolute 12.01 (H) 1.20 - 7.70 x10*3/uL    Immature Granulocytes Absolute, Automated 0.08 0.00 - 0.70 x10*3/uL    Lymphocytes Absolute 0.77 (L) 1.20 - 4.80 x10*3/uL    Monocytes Absolute 0.99 0.10 - 1.00 x10*3/uL     Eosinophils Absolute 0.19 0.00 - 0.70 x10*3/uL    Basophils Absolute 0.05 0.00 - 0.10 x10*3/uL   Magnesium   Result Value Ref Range    Magnesium 1.50 (L) 1.60 - 3.10 mg/dL   Vancomycin   Result Value Ref Range    Vancomycin 43.9 (H) 10.0 - 20.0 ug/mL   Comprehensive metabolic panel   Result Value Ref Range    Glucose 60 (L) 65 - 99 mg/dL    Sodium 133 133 - 145 mmol/L    Potassium 4.7 3.4 - 5.1 mmol/L    Chloride 88 (L) 97 - 107 mmol/L    Bicarbonate 21 (L) 24 - 31 mmol/L    Urea Nitrogen 35 (H) 8 - 25 mg/dL    Creatinine 7.80 (H) 0.40 - 1.60 mg/dL    eGFR 6 (L) >60 mL/min/1.73m*2    Calcium 7.8 (L) 8.5 - 10.4 mg/dL    Albumin 3.1 (L) 3.5 - 5.0 g/dL    Alkaline Phosphatase 69 35 - 125 U/L    Total Protein 7.2 5.9 - 7.9 g/dL    AST 12 5 - 40 U/L    Bilirubin, Total 0.4 0.1 - 1.2 mg/dL    ALT <5 (L) 5 - 40 U/L    Anion Gap >19 (H) <=19 mmol/L   Phosphorus   Result Value Ref Range    Phosphorus 2.7 2.5 - 4.5 mg/dL   Coagulation Screen   Result Value Ref Range    Protime 14.0 (H) 9.3 - 12.7 seconds    INR 1.4 (H) 0.9 - 1.2    aPTT 32.6 (H) 22.0 - 32.5 seconds   POCT GLUCOSE   Result Value Ref Range    POCT Glucose 77 74 - 99 mg/dL   Troponin T   Result Value Ref Range    Troponin T, High Sensitivity 71 (HH) <=14 ng/L   Creatine Kinase   Result Value Ref Range    Creatine Kinase 47 24 - 195 U/L   MRSA Surveillance for Vancomycin De-escalation, PCR    Specimen: Anterior Nares; Swab   Result Value Ref Range    MRSA PCR Detected (A) Not Detected   BLOOD GAS VENOUS FULL PANEL   Result Value Ref Range    POCT pH, Venous 7.32 (L) 7.33 - 7.43 pH    POCT pCO2, Venous 43 41 - 51 mm Hg    POCT pO2, Venous 42 35 - 45 mm Hg    POCT SO2, Venous 72 45 - 75 %    POCT Oxy Hemoglobin, Venous 69.4 45.0 - 75.0 %    POCT Hematocrit Calculated, Venous 25.0 (L) 36.0 - 46.0 %    POCT Sodium, Venous 127 (L) 136 - 145 mmol/L    POCT Potassium, Venous 5.7 (H) 3.5 - 5.3 mmol/L    POCT Chloride, Venous 93 (L) 98 - 107 mmol/L    POCT Ionized  Calicum, Venous 1.16 1.10 - 1.33 mmol/L    POCT Glucose, Venous 84 74 - 99 mg/dL    POCT Lactate, Venous 0.9 0.4 - 2.0 mmol/L    POCT Base Excess, Venous -3.7 (L) -2.0 - 3.0 mmol/L    POCT HCO3 Calculated, Venous 22.2 22.0 - 26.0 mmol/L    POCT Hemoglobin, Venous 8.4 (L) 12.0 - 16.0 g/dL    POCT Anion Gap, Venous 18.0 10.0 - 25.0 mmol/L    Patient Temperature 37.0 degrees Celsius    FiO2 21 %   Magnesium   Result Value Ref Range    Magnesium 2.40 1.60 - 3.10 mg/dL   Basic metabolic panel   Result Value Ref Range    Glucose 90 65 - 99 mg/dL    Sodium 133 133 - 145 mmol/L    Potassium 5.7 (H) 3.4 - 5.1 mmol/L    Chloride 87 (L) 97 - 107 mmol/L    Bicarbonate 20 (L) 24 - 31 mmol/L    Urea Nitrogen 40 (H) 8 - 25 mg/dL    Creatinine 8.40 (H) 0.40 - 1.60 mg/dL    eGFR 5 (L) >60 mL/min/1.73m*2    Calcium 8.9 8.5 - 10.4 mg/dL    Anion Gap >19 (H) <=19 mmol/L   TSH   Result Value Ref Range    Thyroid Stimulating Hormone 2.12 0.27 - 4.20 mIU/L   T4, free   Result Value Ref Range    Thyroxine, Free 0.90 0.90 - 1.70 ng/dL   POCT GLUCOSE   Result Value Ref Range    POCT Glucose 83 74 - 99 mg/dL   POCT GLUCOSE   Result Value Ref Range    POCT Glucose 75 74 - 99 mg/dL   POCT GLUCOSE   Result Value Ref Range    POCT Glucose 88 74 - 99 mg/dL   CBC and Auto Differential   Result Value Ref Range    WBC 6.6 4.4 - 11.3 x10*3/uL    nRBC 0.0 0.0 - 0.0 /100 WBCs    RBC 2.88 (L) 4.00 - 5.20 x10*6/uL    Hemoglobin 7.4 (L) 12.0 - 16.0 g/dL    Hematocrit 24.9 (L) 36.0 - 46.0 %    MCV 87 80 - 100 fL    MCH 25.7 (L) 26.0 - 34.0 pg    MCHC 29.7 (L) 32.0 - 36.0 g/dL    RDW 18.7 (H) 11.5 - 14.5 %    Platelets 145 (L) 150 - 450 x10*3/uL    Neutrophils % 85.2 40.0 - 80.0 %    Immature Granulocytes %, Automated 0.3 0.0 - 0.9 %    Lymphocytes % 6.5 13.0 - 44.0 %    Monocytes % 7.7 2.0 - 10.0 %    Eosinophils % 0.0 0.0 - 6.0 %    Basophils % 0.3 0.0 - 2.0 %    Neutrophils Absolute 5.66 1.20 - 7.70 x10*3/uL    Immature Granulocytes Absolute, Automated  0.02 0.00 - 0.70 x10*3/uL    Lymphocytes Absolute 0.43 (L) 1.20 - 4.80 x10*3/uL    Monocytes Absolute 0.51 0.10 - 1.00 x10*3/uL    Eosinophils Absolute 0.00 0.00 - 0.70 x10*3/uL    Basophils Absolute 0.02 0.00 - 0.10 x10*3/uL   Basic Metabolic Panel   Result Value Ref Range    Glucose 130 (H) 65 - 99 mg/dL    Sodium 132 (L) 133 - 145 mmol/L    Potassium 3.7 3.4 - 5.1 mmol/L    Chloride 94 (L) 97 - 107 mmol/L    Bicarbonate 25 24 - 31 mmol/L    Urea Nitrogen 13 8 - 25 mg/dL    Creatinine 3.20 (H) 0.40 - 1.60 mg/dL    eGFR 17 (L) >60 mL/min/1.73m*2    Calcium 8.3 (L) 8.5 - 10.4 mg/dL    Anion Gap 13 <=19 mmol/L   Magnesium   Result Value Ref Range    Magnesium 1.90 1.60 - 3.10 mg/dL   Phosphorus   Result Value Ref Range    Phosphorus 2.8 2.5 - 4.5 mg/dL   POCT GLUCOSE   Result Value Ref Range    POCT Glucose 109 (H) 74 - 99 mg/dL   Vancomycin   Result Value Ref Range    Vancomycin 17.1 10.0 - 20.0 ug/mL   POCT GLUCOSE   Result Value Ref Range    POCT Glucose 97 74 - 99 mg/dL   CBC and Auto Differential   Result Value Ref Range    WBC 7.0 4.4 - 11.3 x10*3/uL    nRBC 0.0 0.0 - 0.0 /100 WBCs    RBC 3.14 (L) 4.00 - 5.20 x10*6/uL    Hemoglobin 8.0 (L) 12.0 - 16.0 g/dL    Hematocrit 26.7 (L) 36.0 - 46.0 %    MCV 85 80 - 100 fL    MCH 25.5 (L) 26.0 - 34.0 pg    MCHC 30.0 (L) 32.0 - 36.0 g/dL    RDW 18.9 (H) 11.5 - 14.5 %    Platelets 151 150 - 450 x10*3/uL    Neutrophils % 77.9 40.0 - 80.0 %    Immature Granulocytes %, Automated 0.3 0.0 - 0.9 %    Lymphocytes % 9.4 13.0 - 44.0 %    Monocytes % 11.6 2.0 - 10.0 %    Eosinophils % 0.4 0.0 - 6.0 %    Basophils % 0.4 0.0 - 2.0 %    Neutrophils Absolute 5.45 1.20 - 7.70 x10*3/uL    Immature Granulocytes Absolute, Automated 0.02 0.00 - 0.70 x10*3/uL    Lymphocytes Absolute 0.66 (L) 1.20 - 4.80 x10*3/uL    Monocytes Absolute 0.81 0.10 - 1.00 x10*3/uL    Eosinophils Absolute 0.03 0.00 - 0.70 x10*3/uL    Basophils Absolute 0.03 0.00 - 0.10 x10*3/uL   Renal function panel   Result  Value Ref Range    Glucose 110 (H) 65 - 99 mg/dL    Sodium 132 (L) 133 - 145 mmol/L    Potassium 4.3 3.4 - 5.1 mmol/L    Chloride 92 (L) 97 - 107 mmol/L    Bicarbonate 22 (L) 24 - 31 mmol/L    Urea Nitrogen 19 8 - 25 mg/dL    Creatinine 4.00 (H) 0.40 - 1.60 mg/dL    eGFR 13 (L) >60 mL/min/1.73m*2    Calcium 9.0 8.5 - 10.4 mg/dL    Phosphorus 4.3 2.5 - 4.5 mg/dL    Albumin 3.3 (L) 3.5 - 5.0 g/dL    Anion Gap 18 <=19 mmol/L   Magnesium   Result Value Ref Range    Magnesium 2.40 1.60 - 3.10 mg/dL   Transthoracic Echo (TTE) Complete   Result Value Ref Range    BSA 1.82 m2   POCT GLUCOSE   Result Value Ref Range    POCT Glucose 111 (H) 74 - 99 mg/dL       Assessment/Plan   Stage kidney disease I will schedule for dialysis tomorrow morning at the tablo  machine  Hyperkalemia resolved  Septic shock improving continue antibiotic therapy per infectious disease we will make a decision regarding removing catheters tomorrow when the final cultures are back also awaiting LAURA  Most likely line infection in her dialysis catheter  Anemia of chronic kidney disease  Hypertension  Aortic and mitral valve replacements                  Zhou Pedersen MD

## 2024-04-29 NOTE — NURSING NOTE
Pt's tablo alarming low arterial pressure.  Lines flushed and blood returned obtained.  Lines switched and tablo restarted.  Still alarming low arterial pressures.  Pt unable to lay flat or on her side.  Unable to get tablo to run without alarms.  Dr. LIBIA lane, aware of difficulty with line and tablo.  Will attempt to use pt's permacath.

## 2024-04-29 NOTE — CARE PLAN
Problem: Pain  Goal: My pain/discomfort is manageable  Outcome: Progressing     Problem: Safety  Goal: Patient will be injury free during hospitalization  Outcome: Progressing  Goal: I will remain free of falls  Outcome: Progressing     Problem: Daily Care  Goal: Daily care needs are met  Outcome: Progressing     Problem: Psychosocial Needs  Goal: Demonstrates ability to cope with hospitalization/illness  Outcome: Progressing  Goal: Collaborate with me, my family, and caregiver to identify my specific goals  Outcome: Progressing     Problem: Discharge Barriers  Goal: My discharge needs are met  Outcome: Progressing     Problem: Skin  Goal: Decreased wound size/increased tissue granulation at next dressing change  Outcome: Progressing  Flowsheets (Taken 4/29/2024 0410)  Decreased wound size/increased tissue granulation at next dressing change: Protective dressings over bony prominences  Goal: Participates in plan/prevention/treatment measures  Outcome: Progressing  Flowsheets (Taken 4/29/2024 0410)  Participates in plan/prevention/treatment measures: Elevate heels  Goal: Prevent/manage excess moisture  Outcome: Progressing  Flowsheets (Taken 4/29/2024 0410)  Prevent/manage excess moisture:   Moisturize dry skin   Monitor for/manage infection if present   Cleanse incontinence/protect with barrier cream  Goal: Prevent/minimize sheer/friction injuries  Outcome: Progressing  Flowsheets (Taken 4/29/2024 0410)  Prevent/minimize sheer/friction injuries:   Complete micro-shifts as needed if patient unable. Adjust patient position to relieve pressure points, not a full turn   Use pull sheet   HOB 30 degrees or less  Goal: Promote/optimize nutrition  Outcome: Progressing  Flowsheets (Taken 4/29/2024 0410)  Promote/optimize nutrition:   Monitor/record intake including meals   Offer water/supplements/favorite foods  Goal: Promote skin healing  Outcome: Progressing  Flowsheets (Taken 4/29/2024 0410)  Promote skin healing:    Protective dressings over bony prominences   Turn/reposition every 2 hours/use positioning/transfer devices     Problem: Pain  Goal: Takes deep breaths with improved pain control throughout the shift  Outcome: Progressing  Goal: Turns in bed with improved pain control throughout the shift  Outcome: Progressing  Goal: Walks with improved pain control throughout the shift  Outcome: Progressing  Goal: Performs ADL's with improved pain control throughout shift  Outcome: Progressing  Goal: Participates in PT with improved pain control throughout the shift  Outcome: Progressing  Goal: Free from opioid side effects throughout the shift  Outcome: Progressing  Goal: Free from acute confusion related to pain meds throughout the shift  Outcome: Progressing     Problem: Infection related to problem list condition  Goal: Infection will resolve through treatment  Outcome: Progressing     Problem: Fall/Injury  Goal: Not fall by end of shift  Outcome: Progressing  Goal: Be free from injury by end of the shift  Outcome: Progressing  Goal: Verbalize understanding of personal risk factors for fall in the hospital  Outcome: Progressing  Goal: Verbalize understanding of risk factor reduction measures to prevent injury from fall in the home  Outcome: Progressing  Goal: Use assistive devices by end of the shift  Outcome: Progressing  Goal: Pace activities to prevent fatigue by end of the shift  Outcome: Progressing   The patient's goals for the shift include  finish dislysis(Tablo)    The clinical goals for the shift include Remain hemodynaically stable    Over the shift, the patient did not make progress toward the following goals. Barriers to progression include. Recommendations to address these barriers include.

## 2024-04-29 NOTE — PROGRESS NOTES
04/29/24 1536   [facility] Disability Status   Are you deaf or do you have serious difficulty hearing? N   Are you blind or do you have serious difficulty seeing, even when wearing glasses? N   Because of a physical, mental, or emotional condition, do you have serious difficulty concentrating, remembering, or making decisions? (5 years old or older) N   Do you have serious difficulty walking or climbing stairs? N   Do you have serious difficulty dressing or bathing? N   Because of a physical, mental, or emotional condition, do you have serious difficulty doing errands alone such as visiting the doctor? N

## 2024-04-29 NOTE — PROGRESS NOTES
"[facility] Critical Care Medicine       Date:  4/29/2024  Patient:  Batsheva Page  YOB: 1975  MRN:  [id number]   Admit Date:  4/27/2024    Chief Complaint   Patient presents with    Fever     High fever, chills, thinks she has a blood infection. Hx of many blood infections. 102.7 F at home 1/2 hour ago. Reports taking a couple Asprin, but no Tylenol. Dialysis pt. Last dialysis was yesterday. Symptoms started around Tuesday. Pt reports that it's hard to get a good BP reading on her, dialysis center always has a hard time with it.    Flu Symptoms       Patient is a 48 y.o. female admitted on 4/27/2024  8:50 PM with the following indication(s) for ICU care Septic shock.   Hospital day 1 ICU day 1d 8h     History of Present Illness:  48 y.o. 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   Patient also complained of generalized myalgias, pain at the cath insertion site and malodor from the cath site. Patient described her outpatient dialysis center as \"filthy\" and noted improper care and access of her HD cath on multiple occasions by the staff there.     ED course:  Patient was hypotensive in the ER 76/36, was administered 250 ml of crystalloids, and a loading dose of vancomycin and zosyn. 2 sets of blood cultures were drawn.     Labs: WBC 21K with Left shift. Creatinine 7, Lactate 2.4, K- 6.6  She was sent to the MICU with a peripheral IV but no central catheters and no vasopressors.  Nephrology consulted for urgent dialysis.     Interval ICU Events:  4/28: Patient went into a narrow complex tachycardia this AM with rates up to 200's.  Norepinephrine gtt had been started for hypotension, turned off when patient " became tachycardic.  IV metoprolol given.  Patient currently in NSR with MAP>60 off vasopressors.  Mg was 1.5, replaced and redraw pending.  K 4.7 this morning, repeat pending.     4/29: Patient tolerated dialysis yesterday.  Remains off pressors with improvement in blood pressures.  Did have a couple episodes of non sustained SVT overnight. She is still having chronic pain issues, discussed medication regimen with patient and bedside RN, will adjust as needed. Spoke with ID Dr Soto this morning.  Will continue Vanco/Zosyn, monitor cultures, and await ECHO before making decision on tunneled catheter.     Medical History:  Past Medical History:   Diagnosis Date    Aortic valve replaced 2022    CHF (congestive heart failure) (Multi)     Chronic pain     Coronary artery disease     Disease of thyroid gland     ESRD (end stage renal disease) (Multi)     H/O mitral valve replacement 2013    and 2022    Heart disease     History of transfusion     Hypertension     Mitral valve regurgitation     Seizures (Multi)     Stroke (Multi)      Past Surgical History:   Procedure Laterality Date    AORTIC VALVE REPLACEMENT  2020    bioprosthetic    CORONARY ARTERY BYPASS GRAFT      IR CVC TUNNELED  09/09/2022    [place]UNN[place] [facility] [place]    IR CVC TUNNELED  12/28/2022    IR CVC TUNN[place] [facility] [place]    MITRAL VALVE REPLACEMENT  2013    bioprosthetic    MITRAL VALVE REPLACEMENT  2020    bioprosthetic    PARATHYROIDECTOMY      US GUIDED PERCUTANEOUS PLACEMENT  07/14/2022    US GUIDED PERCUTANEOUS PLACEMENT [facility]     Medications Prior to Admission   Medication Sig Dispense Refill Last Dose    aspirin 81 mg EC tablet Take 1 tablet (81 mg) by mouth once daily. 30 tablet 2 4/27/2024    atorvastatin (Lipitor) 40 mg tablet Take 1 tablet (40 mg) by mouth once daily.   4/27/2024    buPROPion (Wellbutrin) 100 mg tablet Take 1 tablet (100 mg) by mouth every other day. 30 tablet 3 Past Week     calcitriol (Rocaltrol) 0.25 mcg capsule Take 2 capsules (0.5 mcg) by mouth once daily.   4/27/2024    cloNIDine (Catapres) 0.1 mg tablet Take 1 tablet (0.1 mg) by mouth every 8 hours. 90 tablet 3 4/27/2024    epoetin brandy-epbx (RETACRIT) 20,000 unit/mL solution Inject 6,440 Units under the skin 3 times a week. Do not start before January 17, 2024. 30 mL 2 Past Week    ergocalciferol (Vitamin D-2) 1.25 MG (34202 UT) capsule Take 1 capsule (1,250 mcg) by mouth 1 (one) time per week.   Past Week    hydrALAZINE (Apresoline) 50 mg tablet Take 1 tablet (50 mg) by mouth 3 times a day.   4/27/2024    levETIRAcetam (Keppra) 500 mg tablet Take 1.5 tablets (750 mg) by mouth once daily at bedtime.   4/27/2024    metoprolol succinate XL (Toprol-XL) 50 mg 24 hr tablet TAKE 1 TABLET BY MOUTH TWICE DAILY 60 tablet 0 4/27/2024    oxyCODONE-acetaminophen (Percocet) 5-325 mg tablet Take 1 tablet by mouth every 6 hours as needed for moderate pain (4 - 6). 5 tablet 0 4/27/2024    pregabalin (Lyrica) 25 mg capsule Take 2 capsules (50 mg) by mouth 2 times a day. 120 capsule 0 4/27/2024    promethazine (Phenergan) 25 mg tablet Take 1 tablet (25 mg) by mouth once daily as needed.   4/27/2024    sodium zirconium cyclosilicate (Lokelma) 10 gram packet DISSOLVE 1 PACKET INTO 45ML OF WATER TAKE DAILY BEFORE DINNER. ADMINSTER OTHER MEDICTIONS AT LEAST 2 HOURS BEFORE OR 2 HOURS AFTER LOKELMA 30 each 0 Past Month    acetaminophen (Tylenol) 325 mg tablet Take 2 tablets (650 mg) by mouth every 6 hours as needed for mild pain (1 - 3). 30 tablet 0 Unknown     Kayexalate, Metoclopramide hcl, Prochlorperazine, Zofran [ondansetron hcl], and Ondansetron  Social History     Tobacco Use    Smoking status: Never    Smokeless tobacco: Never   Vaping Use    Vaping status: Never Used   Substance Use Topics    Alcohol use: Never    Drug use: Never     Family History   Problem Relation Name Age of Onset    No Known Problems Mother      No Known Problems Father          Hospital Medications:             Current Facility-Administered Medications:     acetaminophen (Tylenol) tablet 650 mg, 650 mg, oral, q6h PRN, Alex Alcocer PA-C    aspirin EC tablet 81 mg, 81 mg, oral, Daily, ROSA Jacinto-C, 81 mg at 04/29/24 0944    atorvastatin (Lipitor) tablet 40 mg, 40 mg, oral, Nightly, Alex Alcocer PA-C    buPROPion (Wellbutrin) tablet 100 mg, 100 mg, oral, Every other day, Alex Alcocer PA-C    calcitriol (Rocaltrol) capsule 0.5 mcg, 0.5 mcg, oral, Daily, ROSA Jacinto-C, 0.5 mcg at 04/29/24 0942    dextrose 50 % injection 12.5 g, 12.5 g, intravenous, q15 min PRN, ANGELA Zarate    dextrose 50 % injection 25 g, 25 g, intravenous, q15 min PRN, ANGELA Zarate    diphenhydrAMINE (BENADryl) injection 25 mg, 25 mg, intravenous, q6h PRN, ANGELA Zarate, 25 mg at 04/29/24 0943    diphenhydrAMINE (BENADryl) injection 50 mg, 50 mg, intravenous, Once, Zhou Pedersen MD    epoetin brandy-epbx (RETACRIT) injection 6,500 Units, 6,500 Units, subcutaneous, Once per day on Monday Wednesday Friday, Alex Alcocer PA-C, 6,500 Units at 04/29/24 0900    ergocalciferol (Vitamin D-2) capsule 1,250 mcg, 1,250 mcg, oral, Every Sunday, Alex Alcocer PA-C    glucagon (Glucagen) injection 1 mg, 1 mg, intramuscular, q15 min PRN, ANGELA Zarate    glucagon (Glucagen) injection 1 mg, 1 mg, intramuscular, q15 min PRN, ANGELA Zarate    heparin (porcine) injection 5,000 Units, 5,000 Units, subcutaneous, q8h KIMBERLY, Alex E Hipps, PA-C    heparin 1,000 unit/mL injection 1,000 Units, 1,000 Units, intra-catheter, After Dialysis, Zhou Pedersen MD, 2,100 Units at 04/29/24 0027    heparin 1,000 unit/mL injection 1,000 Units, 1,000 Units, intra-catheter, After Dialysis, Zhou Pedersen MD, 1,500 Units at 04/29/24 0814    HYDROmorphone (Dilaudid) injection 0.2 mg, 0.2 mg, intravenous, q3h PRN, Lane Haney MD, 0.2 mg at 04/29/24 0750    levETIRAcetam in NaCl  (iso-os) (Keppra)  mg, 500 mg, intravenous, Nightly, Alex Serranos, PA-C, Stopped at 04/28/24 2351    oxyCODONE-acetaminophen (Percocet) 5-325 mg per tablet 1 tablet, 1 tablet, oral, q6h PRN, Alex E Hipps, PA-C, 1 tablet at 04/29/24 0227    piperacillin-tazobactam-dextrose (Zosyn) IV 2.25 g, 2.25 g, intravenous, q8h, Alex Serranos, PA-C, Stopped at 04/29/24 0552    polyethylene glycol (Glycolax, Miralax) packet 17 g, 17 g, oral, Daily PRN, ORI Zarate-CNP    pregabalin (Lyrica) capsule 50 mg, 50 mg, oral, BID, Alex Serranos, PA-C, 50 mg at 04/29/24 0944    promethazine (Phenergan) 12.5 mg in sodium chloride 0.9% 50 mL IV, 12.5 mg, intravenous, q6h PRN, ORI Zarate-CNP    promethazine (Phenergan) tablet 25 mg, 25 mg, oral, Daily PRN, Alex Serranos, PA-C    sodium zirconium cyclosilicate (Lokelma) packet 10 g, 10 g, oral, q8h, Alex E Hipps, PA-C    vancomycin (Vancocin) pharmacy to dose - pharmacy monitoring, , miscellaneous, Daily PRN, Adama Soto MD    Review of Systems:  14 point review of systems was completed and negative except for those specially mention in my HPI    Physical Exam:    Heart Rate:  []   Temp:  [36.4 °C (97.5 °F)-37.3 °C (99.1 °F)]   Resp:  [11-26]   BP: ()/()   Weight:  [68.7 kg (151 lb 7.3 oz)-70.6 kg (155 lb 10.3 oz)]   SpO2:  [61 %-100 %]     Physical Exam  Constitutional:       General: She is not in acute distress.  HENT:      Head: Normocephalic and atraumatic.   Eyes:      Extraocular Movements: Extraocular movements intact.      Conjunctiva/sclera: Conjunctivae normal.   Cardiovascular:      Rate and Rhythm: Normal rate and regular rhythm.      Pulses:           Radial pulses are 2+ on the right side and 2+ on the left side.        Dorsalis pedis pulses are 1+ on the right side and 1+ on the left side.   Pulmonary:      Breath sounds: Normal breath sounds and air entry.   Abdominal:      General: Bowel sounds are normal.      Palpations: Abdomen  is soft.   Musculoskeletal:      Cervical back: Normal range of motion and neck supple.      Right lower leg: No edema.      Left lower leg: No edema.      Comments: 5/5 strength to all extremities   Skin:     General: Skin is warm and dry.      Capillary Refill: Capillary refill takes less than 2 seconds.   Neurological:      Mental Status: She is alert and oriented to person, place, and time.      GCS: GCS eye subscore is 4. GCS verbal subscore is 5. GCS motor subscore is 6.      Cranial Nerves: Cranial nerves 2-12 are intact.   Psychiatric:         Behavior: Behavior is cooperative.         Objective:    I have reviewed all medications, laboratory results, and imaging pertinent for today's encounter.           Intake/Output Summary (Last 24 hours) at 4/29/2024 0957  Last data filed at 4/29/2024 0723  Gross per 24 hour   Intake 400 ml   Output --   Net 400 ml       Results for orders placed or performed during the hospital encounter of 04/27/24 (from the past 24 hour(s))   BLOOD GAS VENOUS FULL PANEL   Result Value Ref Range    POCT pH, Venous 7.32 (L) 7.33 - 7.43 pH    POCT pCO2, Venous 43 41 - 51 mm Hg    POCT pO2, Venous 42 35 - 45 mm Hg    POCT SO2, Venous 72 45 - 75 %    POCT Oxy Hemoglobin, Venous 69.4 45.0 - 75.0 %    POCT Hematocrit Calculated, Venous 25.0 (L) 36.0 - 46.0 %    POCT Sodium, Venous 127 (L) 136 - 145 mmol/L    POCT Potassium, Venous 5.7 (H) 3.5 - 5.3 mmol/L    POCT Chloride, Venous 93 (L) 98 - 107 mmol/L    POCT Ionized Calicum, Venous 1.16 1.10 - 1.33 mmol/L    POCT Glucose, Venous 84 74 - 99 mg/dL    POCT Lactate, Venous 0.9 0.4 - 2.0 mmol/L    POCT Base Excess, Venous -3.7 (L) -2.0 - 3.0 mmol/L    POCT HCO3 Calculated, Venous 22.2 22.0 - 26.0 mmol/L    POCT Hemoglobin, Venous 8.4 (L) 12.0 - 16.0 g/dL    POCT Anion Gap, Venous 18.0 10.0 - 25.0 mmol/L    Patient Temperature 37.0 degrees Celsius    FiO2 21 %   Magnesium   Result Value Ref Range    Magnesium 2.40 1.60 - 3.10 mg/dL   Basic  metabolic panel   Result Value Ref Range    Glucose 90 65 - 99 mg/dL    Sodium 133 133 - 145 mmol/L    Potassium 5.7 (H) 3.4 - 5.1 mmol/L    Chloride 87 (L) 97 - 107 mmol/L    Bicarbonate 20 (L) 24 - 31 mmol/L    Urea Nitrogen 40 (H) 8 - 25 mg/dL    Creatinine 8.40 (H) 0.40 - 1.60 mg/dL    eGFR 5 (L) >60 mL/min/1.73m*2    Calcium 8.9 8.5 - 10.4 mg/dL    Anion Gap >19 (H) <=19 mmol/L   TSH   Result Value Ref Range    Thyroid Stimulating Hormone 2.12 0.27 - 4.20 mIU/L   T4, free   Result Value Ref Range    Thyroxine, Free 0.90 0.90 - 1.70 ng/dL   POCT GLUCOSE   Result Value Ref Range    POCT Glucose 83 74 - 99 mg/dL   POCT GLUCOSE   Result Value Ref Range    POCT Glucose 75 74 - 99 mg/dL   POCT GLUCOSE   Result Value Ref Range    POCT Glucose 88 74 - 99 mg/dL   CBC and Auto Differential   Result Value Ref Range    WBC 6.6 4.4 - 11.3 x10*3/uL    nRBC 0.0 0.0 - 0.0 /100 WBCs    RBC 2.88 (L) 4.00 - 5.20 x10*6/uL    Hemoglobin 7.4 (L) 12.0 - 16.0 g/dL    Hematocrit 24.9 (L) 36.0 - 46.0 %    MCV 87 80 - 100 fL    MCH 25.7 (L) 26.0 - 34.0 pg    MCHC 29.7 (L) 32.0 - 36.0 g/dL    RDW 18.7 (H) 11.5 - 14.5 %    Platelets 145 (L) 150 - 450 x10*3/uL    Neutrophils % 85.2 40.0 - 80.0 %    Immature Granulocytes %, Automated 0.3 0.0 - 0.9 %    Lymphocytes % 6.5 13.0 - 44.0 %    Monocytes % 7.7 2.0 - 10.0 %    Eosinophils % 0.0 0.0 - 6.0 %    Basophils % 0.3 0.0 - 2.0 %    Neutrophils Absolute 5.66 1.20 - 7.70 x10*3/uL    Immature Granulocytes Absolute, Automated 0.02 0.00 - 0.70 x10*3/uL    Lymphocytes Absolute 0.43 (L) 1.20 - 4.80 x10*3/uL    Monocytes Absolute 0.51 0.10 - 1.00 x10*3/uL    Eosinophils Absolute 0.00 0.00 - 0.70 x10*3/uL    Basophils Absolute 0.02 0.00 - 0.10 x10*3/uL   Basic Metabolic Panel   Result Value Ref Range    Glucose 130 (H) 65 - 99 mg/dL    Sodium 132 (L) 133 - 145 mmol/L    Potassium 3.7 3.4 - 5.1 mmol/L    Chloride 94 (L) 97 - 107 mmol/L    Bicarbonate 25 24 - 31 mmol/L    Urea Nitrogen 13 8 - 25 mg/dL     Creatinine 3.20 (H) 0.40 - 1.60 mg/dL    eGFR 17 (L) >60 mL/min/1.73m*2    Calcium 8.3 (L) 8.5 - 10.4 mg/dL    Anion Gap 13 <=19 mmol/L   Magnesium   Result Value Ref Range    Magnesium 1.90 1.60 - 3.10 mg/dL   Phosphorus   Result Value Ref Range    Phosphorus 2.8 2.5 - 4.5 mg/dL   POCT GLUCOSE   Result Value Ref Range    POCT Glucose 109 (H) 74 - 99 mg/dL   Vancomycin   Result Value Ref Range    Vancomycin 17.1 10.0 - 20.0 ug/mL   POCT GLUCOSE   Result Value Ref Range    POCT Glucose 97 74 - 99 mg/dL       Assessment/Plan:    I am currently managing this critically ill patient for the following problems:    Neuro/Psych/Pain Ctrl/Sedation:  #Chronic pain  #Hx seizures  -Neuro checks per protocol  -Patient is A&Ox's 3  -Continue home lyrica and PRN percocet  -PRN dilaudid   -Continue home keppra  -Monitor CAM-ICU    Respiratory/ENT:  -Currently on RA  -Continuous pulse oximetry, maintain SPO2>92%  -Encourage IS  -OOB to chair    Cardiovascular:  #Septic shock  #Narrow complex tachycardia, likely SVT  #Hx endocarditis with aortic (2020) and mitral (2013/2020) valve replacements  #Hx HTN  -Continuous cardiac monitoring  -Maintain goal MAP>60  -Currently off pressors  -Currently in sinus rhythm  -ECHO ordered  -Monitor and optimize electrolytes, keep K>4, Mg>2  -Home hydralazine, metoprolol, and clonidine on hold  -PRN IV metoprolol if tachycardia reoccurs  -Continue home Asa and statin    GI:  -Diet: Clear liquids  -PRN phenergan for nausea  -GI prophylaxis: none  -Bowel regimen: Miralax PRN  -BM today    Renal/Volume Status (Intra & Extravascular):  #ESRD on HD  #Hyperkalemia  #AG metabolic acidosis 2/2 lactic acidosis resovled  -Nephrology consulted for RRT  -Renal function: 35/7.80  -K 6.6>4.7>5.7>3.7  -Daily RFP, Mg, Phos  -Replace electrolytes PRN    Endocrine  #Hypoglycemia improving  -BS 60 this morning  -Monitor blood glucose Q4   -Goal BS<180  -Hypoglycemic protocol  -TSH/T4 WNL    Infectious  Disease:  #Leukocytosis  #Hx MRSA dialysis catheter infection/endocarditis  -Tmax 37.3  -WBC 21.5>14.1>7  -Continue IV vanco/zosyn (4/28-**)  -BC pending  -ID consulted, appreciate recs  -Per ID we will continue anbx as above, follow cultures, and await ECHO results. Tunneled line will remain in place for now  -Trialysis line inserted 4/28 due to pressor requirement, need for access    Heme/Onc:  #Chronic anemia  -HH: 8.0/26.7, appears to be around baseline  -Daily CBC  -Continue home epoetin  -PT/INR 14/1.4 on admission  -Monitor for s/s of bleeding  -Transfuse of Hg <7  -VTE prophylaxis: Heparin SQ    Derm/MSK:  -PT/OT when stable  -ICU skin protocol    Ethics/Code Status:  -DNR/DNI    :  DVT Prophylaxis: Heparin SQ  GI Prophylaxis: none  Bowel Regimen: Miralax PRN  Diet: NPO  CVC: Rt groin trialysis, left subclavian tunneled line  Kathe: no  Mckinney: no  Restraints: no  Dispo: ICU    Plan discussed with: Dr Tolentino  Critical Care Time:  45 minutes  Ivory Puente, APRN-CNP  Pulmonary and Critical Care Medicine

## 2024-04-29 NOTE — PROGRESS NOTES
Vancomycin Dosing by Pharmacy- FOLLOW-UP (HEMODIALYSIS)    Batsheva Page is a 48 y.o. year old female who Pharmacy has been consulted for vancomycin dosing for Vancomycin Indications: Endocarditis. Based on the patient's indication and renal status this patient will be dosed based on a pre-HD level of 20-25 mcg/mL.     Patient is currently on hemodialysis.    No vancomycin today. Random level = 17.1. Will re-assess on 4/30 based on dialysis plan.       Lab Results   Component Value Date    VANCORANDOM 17.1 04/29/2024    VANCOTROUGH 27.9 (HH) 09/26/2022       Visit Vitals  BP (!) 141/102   Pulse 69   Temp 36.4 °C (97.5 °F) (Temporal)   Resp 14        Lab Results   Component Value Date    CREATININE 3.20 (H) 04/29/2024    CREATININE 8.40 (H) 04/28/2024    CREATININE 7.80 (H) 04/28/2024    CREATININE 7.70 (H) 04/27/2024       I/O last 3 completed shifts:  In: 454.2 (6.6 mL/kg) [I.V.:54.2 (0.8 mL/kg); IV Piggyback:400]  Out: - (0 mL/kg)   Weight: 68.7 kg     Assessment/Plan     No vancomycin today. Will continue to follow based on dialysis plan.  Will continue to monitor renal function daily while on vancomycin and order serum creatinine at least every 48 hours if not already ordered.  Follow for continued vancomycin needs, clinical response, and signs/symptoms of toxicity.     Charlie Hannah, PharmD

## 2024-04-29 NOTE — PROGRESS NOTES
TCC met with patient. Assessment complete. Patient lives with a friend. Patient is independent. Patient does dialysis at [facility] in [place] on MWF. Patient uses Dr. Zhou Pedersen as nephrologist, no PCP. Patient fills prescriptions at [facility] in [place]. At the time of discharge, patient will return home. Will follow.      04/29/24 2587   Discharge Planning   Living Arrangements Friends   Support Systems Friends/neighbors   Type of Residence Private residence   Home or Post Acute Services None   Patient expects to be discharged to: Home   Does the patient need discharge transport arranged? No   Financial Resource Strain   How hard is it for you to pay for the very basics like food, housing, medical care, and heating? Not very   Housing Stability   In the last 12 months, was there a time when you were not able to pay the mortgage or rent on time? N   In the last 12 months, how many places have you lived? 1   In the last 12 months, was there a time when you did not have a steady place to sleep or slept in a shelter (including now)? N   Transportation Needs   In the past 12 months, has lack of transportation kept you from medical appointments or from getting medications? no   In the past 12 months, has lack of transportation kept you from meetings, work, or from getting things needed for daily living? No

## 2024-04-29 NOTE — PROGRESS NOTES
04/29/24 1532   Physical Activity   On average, how many days per week do you engage in moderate to strenuous exercise (like a brisk walk)? 0 days   On average, how many minutes do you engage in exercise at this level? 0 min   Financial Resource Strain   How hard is it for you to pay for the very basics like food, housing, medical care, and heating? Not very   Housing Stability   In the last 12 months, was there a time when you were not able to pay the mortgage or rent on time? N   In the last 12 months, how many places have you lived? 1   In the last 12 months, was there a time when you did not have a steady place to sleep or slept in a shelter (including now)? N   Transportation Needs   In the past 12 months, has lack of transportation kept you from medical appointments or from getting medications? no   In the past 12 months, has lack of transportation kept you from meetings, work, or from getting things needed for daily living? No   Food Insecurity   Within the past 12 months, you worried that your food would run out before you got the money to buy more. Never true   Within the past 12 months, the food you bought just didn't last and you didn't have money to get more. Never true   Stress   Do you feel stress - tense, restless, nervous, or anxious, or unable to sleep at night because your mind is troubled all the time - these days? Not at all   Social Connections   In a typical week, how many times do you talk on the phone with family, friends, or neighbors? More than 3   How often do you get together with friends or relatives? More than 3   How often do you attend [employer] or [employer] services? More than 4   Do you belong to any clubs or organizations such as [employer] groups, unions, fraternal or athletic groups, or school groups? No   How often do you attend meetings of the clubs or organizations you belong to? Never   Are you , , , , never , or living with a partner?     Intimate Partner Violence   Within the last year, have you been afraid of your partner or ex-partner? No   Within the last year, have you been humiliated or emotionally abused in other ways by your partner or ex-partner? No   Within the last year, have you been kicked, hit, slapped, or otherwise physically hurt by your partner or ex-partner? No   Within the last year, have you been raped or forced to have any kind of sexual activity by your partner or ex-partner? No   Alcohol Use   Q1: How often do you have a drink containing alcohol? Never   Q2: How many drinks containing alcohol do you have on a typical day when you are drinking? None   Q3: How often do you have six or more drinks on one occasion? Never   Utilities   In the past 12 months has the electric, gas, oil, or water company threatened to shut off services in your home? No   Health Literacy   How often do you need to have someone help you when you read instructions, pamphlets, or other written material from your doctor or pharmacy? Never

## 2024-04-30 ENCOUNTER — APPOINTMENT (OUTPATIENT)
Dept: CARDIOLOGY | Facility: HOSPITAL | Age: 49
DRG: 314 | End: 2024-04-30
Payer: MEDICARE

## 2024-04-30 LAB
ABO GROUP (TYPE) IN BLOOD: NORMAL
ANION GAP SERPL CALC-SCNC: 15 MMOL/L
ANTIBODY SCREEN: NORMAL
BASOPHILS # BLD AUTO: 0.03 X10*3/UL (ref 0–0.1)
BASOPHILS NFR BLD AUTO: 0.6 %
BLOOD EXPIRATION DATE: NORMAL
BUN SERPL-MCNC: 32 MG/DL (ref 8–25)
CA-I BLD-SCNC: 1.09 MMOL/L (ref 1.1–1.33)
CALCIUM SERPL-MCNC: 8.4 MG/DL (ref 8.5–10.4)
CHLORIDE SERPL-SCNC: 93 MMOL/L (ref 97–107)
CO2 SERPL-SCNC: 23 MMOL/L (ref 24–31)
CREAT SERPL-MCNC: 5.5 MG/DL (ref 0.4–1.6)
DISPENSE STATUS: NORMAL
EGFRCR SERPLBLD CKD-EPI 2021: 9 ML/MIN/1.73M*2
EOSINOPHIL # BLD AUTO: 0.18 X10*3/UL (ref 0–0.7)
EOSINOPHIL NFR BLD AUTO: 3.3 %
ERYTHROCYTE [DISTWIDTH] IN BLOOD BY AUTOMATED COUNT: 18.8 % (ref 11.5–14.5)
GLUCOSE BLD MANUAL STRIP-MCNC: 104 MG/DL (ref 74–99)
GLUCOSE BLD MANUAL STRIP-MCNC: 115 MG/DL (ref 74–99)
GLUCOSE SERPL-MCNC: 107 MG/DL (ref 65–99)
HCT VFR BLD AUTO: 23.8 % (ref 36–46)
HGB BLD-MCNC: 6.9 G/DL (ref 12–16)
HYPOCHROMIA BLD QL SMEAR: NORMAL
IMM GRANULOCYTES # BLD AUTO: 0.02 X10*3/UL (ref 0–0.7)
IMM GRANULOCYTES NFR BLD AUTO: 0.4 % (ref 0–0.9)
LYMPHOCYTES # BLD AUTO: 0.6 X10*3/UL (ref 1.2–4.8)
LYMPHOCYTES NFR BLD AUTO: 11.1 %
MAGNESIUM SERPL-MCNC: 2.3 MG/DL (ref 1.6–3.1)
MCH RBC QN AUTO: 25.6 PG (ref 26–34)
MCHC RBC AUTO-ENTMCNC: 29 G/DL (ref 32–36)
MCV RBC AUTO: 88 FL (ref 80–100)
MONOCYTES # BLD AUTO: 0.59 X10*3/UL (ref 0.1–1)
MONOCYTES NFR BLD AUTO: 10.9 %
NEUTROPHILS # BLD AUTO: 3.97 X10*3/UL (ref 1.2–7.7)
NEUTROPHILS NFR BLD AUTO: 73.7 %
NRBC BLD-RTO: 0 /100 WBCS (ref 0–0)
PHOSPHATE SERPL-MCNC: 3.9 MG/DL (ref 2.5–4.5)
PLATELET # BLD AUTO: 143 X10*3/UL (ref 150–450)
POLYCHROMASIA BLD QL SMEAR: NORMAL
POTASSIUM SERPL-SCNC: 4.4 MMOL/L (ref 3.4–5.1)
PRODUCT BLOOD TYPE: 600
PRODUCT CODE: NORMAL
RBC # BLD AUTO: 2.7 X10*6/UL (ref 4–5.2)
RBC MORPH BLD: NORMAL
RH FACTOR (ANTIGEN D): NORMAL
SODIUM SERPL-SCNC: 131 MMOL/L (ref 133–145)
UNIT ABO: NORMAL
UNIT NUMBER: NORMAL
UNIT RH: NORMAL
UNIT VOLUME: 350
WBC # BLD AUTO: 5.4 X10*3/UL (ref 4.4–11.3)
XM INTEP: NORMAL

## 2024-04-30 PROCEDURE — 2500000001 HC RX 250 WO HCPCS SELF ADMINISTERED DRUGS (ALT 637 FOR MEDICARE OP)

## 2024-04-30 PROCEDURE — 2500000004 HC RX 250 GENERAL PHARMACY W/ HCPCS (ALT 636 FOR OP/ED)

## 2024-04-30 PROCEDURE — 2500000004 HC RX 250 GENERAL PHARMACY W/ HCPCS (ALT 636 FOR OP/ED): Performed by: STUDENT IN AN ORGANIZED HEALTH CARE EDUCATION/TRAINING PROGRAM

## 2024-04-30 PROCEDURE — 99291 CRITICAL CARE FIRST HOUR: CPT

## 2024-04-30 PROCEDURE — 2500000004 HC RX 250 GENERAL PHARMACY W/ HCPCS (ALT 636 FOR OP/ED): Mod: JZ | Performed by: INTERNAL MEDICINE

## 2024-04-30 PROCEDURE — 36430 TRANSFUSION BLD/BLD COMPNT: CPT

## 2024-04-30 PROCEDURE — 82330 ASSAY OF CALCIUM: CPT

## 2024-04-30 PROCEDURE — 2500000004 HC RX 250 GENERAL PHARMACY W/ HCPCS (ALT 636 FOR OP/ED): Performed by: INTERNAL MEDICINE

## 2024-04-30 PROCEDURE — 84100 ASSAY OF PHOSPHORUS: CPT

## 2024-04-30 PROCEDURE — 86920 COMPATIBILITY TEST SPIN: CPT

## 2024-04-30 PROCEDURE — 2500000001 HC RX 250 WO HCPCS SELF ADMINISTERED DRUGS (ALT 637 FOR MEDICARE OP): Performed by: INTERNAL MEDICINE

## 2024-04-30 PROCEDURE — 83735 ASSAY OF MAGNESIUM: CPT

## 2024-04-30 PROCEDURE — 2020000001 HC ICU ROOM DAILY

## 2024-04-30 PROCEDURE — 36415 COLL VENOUS BLD VENIPUNCTURE: CPT

## 2024-04-30 PROCEDURE — P9040 RBC LEUKOREDUCED IRRADIATED: HCPCS

## 2024-04-30 PROCEDURE — 85025 COMPLETE CBC W/AUTO DIFF WBC: CPT

## 2024-04-30 PROCEDURE — 87040 BLOOD CULTURE FOR BACTERIA: CPT | Mod: WESLAB | Performed by: INTERNAL MEDICINE

## 2024-04-30 PROCEDURE — 80048 BASIC METABOLIC PNL TOTAL CA: CPT

## 2024-04-30 PROCEDURE — 99291 CRITICAL CARE FIRST HOUR: CPT | Performed by: INTERNAL MEDICINE

## 2024-04-30 PROCEDURE — 82947 ASSAY GLUCOSE BLOOD QUANT: CPT

## 2024-04-30 PROCEDURE — 86901 BLOOD TYPING SEROLOGIC RH(D): CPT

## 2024-04-30 RX ORDER — METOPROLOL TARTRATE 25 MG/1
12.5 TABLET, FILM COATED ORAL 2 TIMES DAILY
Status: DISCONTINUED | OUTPATIENT
Start: 2024-04-30 | End: 2024-05-02

## 2024-04-30 RX ORDER — RIFAMPIN 300 MG/1
300 CAPSULE ORAL EVERY 8 HOURS
Status: DISCONTINUED | OUTPATIENT
Start: 2024-04-30 | End: 2024-05-08 | Stop reason: HOSPADM

## 2024-04-30 RX ORDER — LEVETIRACETAM 500 MG/1
500 TABLET ORAL NIGHTLY
Status: DISCONTINUED | OUTPATIENT
Start: 2024-04-30 | End: 2024-05-08 | Stop reason: HOSPADM

## 2024-04-30 RX ORDER — CALCIUM GLUCONATE 20 MG/ML
2 INJECTION, SOLUTION INTRAVENOUS ONCE
Status: COMPLETED | OUTPATIENT
Start: 2024-04-30 | End: 2024-04-30

## 2024-04-30 RX ORDER — VANCOMYCIN 750 MG/150ML
750 INJECTION, SOLUTION INTRAVENOUS ONCE
Status: COMPLETED | OUTPATIENT
Start: 2024-04-30 | End: 2024-04-30

## 2024-04-30 RX ADMIN — CALCITRIOL CAPSULES 0.25 MCG 0.5 MCG: 0.25 CAPSULE ORAL at 10:04

## 2024-04-30 RX ADMIN — PREGABALIN 50 MG: 50 CAPSULE ORAL at 21:40

## 2024-04-30 RX ADMIN — DIPHENHYDRAMINE HYDROCHLORIDE 25 MG: 50 INJECTION, SOLUTION INTRAMUSCULAR; INTRAVENOUS at 21:40

## 2024-04-30 RX ADMIN — HEPARIN SODIUM 4200 UNITS: 1000 INJECTION INTRAVENOUS; SUBCUTANEOUS at 17:19

## 2024-04-30 RX ADMIN — HYDROMORPHONE HYDROCHLORIDE 0.2 MG: 1 INJECTION, SOLUTION INTRAMUSCULAR; INTRAVENOUS; SUBCUTANEOUS at 17:23

## 2024-04-30 RX ADMIN — BUPROPION HYDROCHLORIDE 100 MG: 100 TABLET, FILM COATED ORAL at 10:04

## 2024-04-30 RX ADMIN — HYDROMORPHONE HYDROCHLORIDE 0.2 MG: 1 INJECTION, SOLUTION INTRAMUSCULAR; INTRAVENOUS; SUBCUTANEOUS at 21:40

## 2024-04-30 RX ADMIN — HYDROMORPHONE HYDROCHLORIDE 0.2 MG: 1 INJECTION, SOLUTION INTRAMUSCULAR; INTRAVENOUS; SUBCUTANEOUS at 08:47

## 2024-04-30 RX ADMIN — RIFAMPIN 300 MG: 300 CAPSULE ORAL at 14:09

## 2024-04-30 RX ADMIN — ATORVASTATIN CALCIUM 40 MG: 40 TABLET, FILM COATED ORAL at 21:40

## 2024-04-30 RX ADMIN — PIPERACILLIN SODIUM AND TAZOBACTAM SODIUM 2.25 G: 2; .25 INJECTION, SOLUTION INTRAVENOUS at 05:35

## 2024-04-30 RX ADMIN — ASPIRIN 81 MG: 81 TABLET, COATED ORAL at 10:04

## 2024-04-30 RX ADMIN — LEVETIRACETAM 500 MG: 500 TABLET, FILM COATED ORAL at 21:40

## 2024-04-30 RX ADMIN — PREGABALIN 50 MG: 50 CAPSULE ORAL at 10:04

## 2024-04-30 RX ADMIN — HEPARIN SODIUM 3000 UNITS: 1000 INJECTION INTRAVENOUS; SUBCUTANEOUS at 12:37

## 2024-04-30 RX ADMIN — HEPARIN SODIUM 5000 UNITS: 5000 INJECTION, SOLUTION INTRAVENOUS; SUBCUTANEOUS at 05:35

## 2024-04-30 RX ADMIN — CALCIUM GLUCONATE 2 G: 20 INJECTION, SOLUTION INTRAVENOUS at 04:35

## 2024-04-30 RX ADMIN — DIPHENHYDRAMINE HYDROCHLORIDE 25 MG: 50 INJECTION, SOLUTION INTRAMUSCULAR; INTRAVENOUS at 10:05

## 2024-04-30 RX ADMIN — RIFAMPIN 300 MG: 300 CAPSULE ORAL at 21:40

## 2024-04-30 RX ADMIN — VANCOMYCIN 750 MG: 750 INJECTION, SOLUTION INTRAVENOUS at 17:16

## 2024-04-30 ASSESSMENT — PAIN SCALES - GENERAL
PAINLEVEL_OUTOF10: 10 - WORST POSSIBLE PAIN
PAINLEVEL_OUTOF10: 7
PAINLEVEL_OUTOF10: 7
PAINLEVEL_OUTOF10: 10 - WORST POSSIBLE PAIN
PAINLEVEL_OUTOF10: 7
PAINLEVEL_OUTOF10: 0 - NO PAIN
PAINLEVEL_OUTOF10: 0 - NO PAIN
PAINLEVEL_OUTOF10: 10 - WORST POSSIBLE PAIN

## 2024-04-30 ASSESSMENT — PAIN - FUNCTIONAL ASSESSMENT
PAIN_FUNCTIONAL_ASSESSMENT: 0-10

## 2024-04-30 ASSESSMENT — PAIN DESCRIPTION - LOCATION
LOCATION: GENERALIZED
LOCATION: GENERALIZED

## 2024-04-30 NOTE — CARE PLAN
Problem: Pain - Adult  Goal: Verbalizes/displays adequate comfort level or baseline comfort level  4/30/2024 1431 by Aracelis Cruz RN  Outcome: Progressing  Flowsheets (Taken 4/30/2024 1426)  Verbalizes/displays adequate comfort level or baseline comfort level:   Encourage patient to monitor pain and request assistance   Assess pain using appropriate pain scale   Administer analgesics based on type and severity of pain and evaluate response   Implement non-pharmacological measures as appropriate and evaluate response   Consider cultural and social influences on pain and pain management   Notify Licensed Independent Practitioner if interventions unsuccessful or patient reports new pain  4/30/2024 1426 by Aracelis Cruz RN  Outcome: Progressing  Flowsheets (Taken 4/30/2024 1426)  Verbalizes/displays adequate comfort level or baseline comfort level:   Encourage patient to monitor pain and request assistance   Assess pain using appropriate pain scale   Administer analgesics based on type and severity of pain and evaluate response   Implement non-pharmacological measures as appropriate and evaluate response   Consider cultural and social influences on pain and pain management   Notify Licensed Independent Practitioner if interventions unsuccessful or patient reports new pain     Problem: Safety - Adult  Goal: Free from fall injury  4/30/2024 1431 by Aracelis Cruz RN  Outcome: Progressing  Flowsheets (Taken 4/30/2024 1426)  Free from fall injury:   Instruct family/caregiver on patient safety   Based on caregiver fall risk screen, instruct family/caregiver to ask for assistance with transferring infant if caregiver noted to have fall risk factors  4/30/2024 1426 by Aracelis Cruz RN  Outcome: Progressing  Flowsheets (Taken 4/30/2024 1426)  Free from fall injury:   Instruct family/caregiver on patient safety   Based on caregiver fall risk screen, instruct family/caregiver to ask for assistance with transferring  infant if caregiver noted to have fall risk factors     Problem: Discharge Planning  Goal: Discharge to home or other facility with appropriate resources  4/30/2024 1431 by Aracelis Cruz RN  Outcome: Progressing  Flowsheets (Taken 4/30/2024 1426)  Discharge to home or other facility with appropriate resources:   Identify barriers to discharge with patient and caregiver   Arrange for needed discharge resources and transportation as appropriate   Identify discharge learning needs (meds, wound care, etc)   Refer to discharge planning if patient needs post-hospital services based on physician order or complex needs related to functional status, cognitive ability or social support system   Arrange for interpreters to assist at discharge as needed  4/30/2024 1426 by Aracelis Cruz RN  Outcome: Progressing  Flowsheets (Taken 4/30/2024 1426)  Discharge to home or other facility with appropriate resources:   Identify barriers to discharge with patient and caregiver   Arrange for needed discharge resources and transportation as appropriate   Identify discharge learning needs (meds, wound care, etc)   Refer to discharge planning if patient needs post-hospital services based on physician order or complex needs related to functional status, cognitive ability or social support system   Arrange for interpreters to assist at discharge as needed     Problem: Chronic Conditions and Co-morbidities  Goal: Patient's chronic conditions and co-morbidity symptoms are monitored and maintained or improved  4/30/2024 1431 by Aracelis Cruz RN  Outcome: Progressing  Flowsheets (Taken 4/30/2024 1426)  Care Plan - Patient's Chronic Conditions and Co-Morbidity Symptoms are Monitored and Maintained or Improved:   Monitor and assess patient's chronic conditions and comorbid symptoms for stability, deterioration, or improvement   Collaborate with multidisciplinary team to address chronic and comorbid conditions and prevent exacerbation or  deterioration   Update acute care plan with appropriate goals if chronic or comorbid symptoms are exacerbated and prevent overall improvement and discharge  4/30/2024 1426 by Aracelis Cruz, RN  Outcome: Progressing  Flowsheets (Taken 4/30/2024 1426)  Care Plan - Patient's Chronic Conditions and Co-Morbidity Symptoms are Monitored and Maintained or Improved:   Monitor and assess patient's chronic conditions and comorbid symptoms for stability, deterioration, or improvement   Collaborate with multidisciplinary team to address chronic and comorbid conditions and prevent exacerbation or deterioration   Update acute care plan with appropriate goals if chronic or comorbid symptoms are exacerbated and prevent overall improvement and discharge     Problem: Skin  Goal: Decreased wound size/increased tissue granulation at next dressing change  4/30/2024 1431 by Aracelis Cruz RN  Outcome: Progressing  Flowsheets (Taken 4/29/2024 1431)  Decreased wound size/increased tissue granulation at next dressing change:   Promote sleep for wound healing   Utilize specialty bed per algorithm   Protective dressings over bony prominences  4/30/2024 1426 by Aracelis Cruz RN  Outcome: Progressing  Flowsheets (Taken 4/30/2024 1426)  Decreased wound size/increased tissue granulation at next dressing change:   Promote sleep for wound healing   Utilize specialty bed per algorithm   Protective dressings over bony prominences  Goal: Participates in plan/prevention/treatment measures  4/30/2024 1431 by Aracelis Cruz RN  Outcome: Progressing  Flowsheets (Taken 4/30/2024 1426)  Participates in plan/prevention/treatment measures:   Elevate heels   Increase activity/out of bed for meals  4/30/2024 1426 by Aracelis Cruz, RN  Outcome: Progressing  Flowsheets (Taken 4/30/2024 1426)  Participates in plan/prevention/treatment measures:   Elevate heels   Increase activity/out of bed for meals  Goal: Prevent/manage excess moisture  4/30/2024 1431 by  Aracelis Cruz RN  Outcome: Progressing  Flowsheets (Taken 4/30/2024 1426)  Prevent/manage excess moisture:   Cleanse incontinence/protect with barrier cream   Moisturize dry skin   Follow provider orders for dressing changes   Monitor for/manage infection if present  4/30/2024 1426 by Aracelis Cruz RN  Outcome: Progressing  Flowsheets (Taken 4/30/2024 1426)  Prevent/manage excess moisture:   Cleanse incontinence/protect with barrier cream   Moisturize dry skin   Follow provider orders for dressing changes   Monitor for/manage infection if present  Goal: Prevent/minimize sheer/friction injuries  4/30/2024 1431 by Aracelis Cruz RN  Outcome: Progressing  Flowsheets (Taken 4/30/2024 1426)  Prevent/minimize sheer/friction injuries:   Complete micro-shifts as needed if patient unable. Adjust patient position to relieve pressure points, not a full turn   Increase activity/out of bed for meals   Use pull sheet   HOB 30 degrees or less   Turn/reposition every 2 hours/use positioning/transfer devices   Utilize specialty bed per algorithm  4/30/2024 1426 by Aracelis Cruz RN  Outcome: Progressing  Flowsheets (Taken 4/30/2024 1426)  Prevent/minimize sheer/friction injuries:   Complete micro-shifts as needed if patient unable. Adjust patient position to relieve pressure points, not a full turn   Increase activity/out of bed for meals   Use pull sheet   HOB 30 degrees or less   Turn/reposition every 2 hours/use positioning/transfer devices   Utilize specialty bed per algorithm  Goal: Promote/optimize nutrition  4/30/2024 1431 by Aracelis Cruz RN  Outcome: Progressing  Flowsheets (Taken 4/30/2024 1426)  Promote/optimize nutrition:   Monitor/record intake including meals   Offer water/supplements/favorite foods   Reassess MST if dietician not consulted  4/30/2024 1426 by Aracelis Cruz RN  Outcome: Progressing  Flowsheets (Taken 4/30/2024 1426)  Promote/optimize nutrition:   Monitor/record intake including meals   Offer  water/supplements/favorite foods   Reassess MST if dietician not consulted  Goal: Promote skin healing  4/30/2024 1431 by Aracelis Cruz RN  Outcome: Progressing  Flowsheets (Taken 4/30/2024 1426)  Promote skin healing:   Assess skin/pad under line(s)/device(s)   Protective dressings over bony prominences   Turn/reposition every 2 hours/use positioning/transfer devices   Ensure correct size (line/device) and apply per  instructions   Rotate device position/do not position patient on device  4/30/2024 1426 by Aracelis Cruz RN  Outcome: Progressing  Flowsheets (Taken 4/30/2024 1426)  Promote skin healing:   Assess skin/pad under line(s)/device(s)   Protective dressings over bony prominences   Turn/reposition every 2 hours/use positioning/transfer devices   Ensure correct size (line/device) and apply per  instructions   Rotate device position/do not position patient on device     Problem: Pain  Goal: Takes deep breaths with improved pain control throughout the shift  4/30/2024 1431 by Aracelis Cruz RN  Outcome: Progressing  4/30/2024 1426 by Aracelis Cruz RN  Outcome: Progressing  Goal: Turns in bed with improved pain control throughout the shift  4/30/2024 1431 by Aracelis Cruz RN  Outcome: Progressing  4/30/2024 1426 by Aracelis Cruz RN  Outcome: Progressing  Goal: Walks with improved pain control throughout the shift  4/30/2024 1431 by Aracelis Cruz RN  Outcome: Progressing  4/30/2024 1426 by Aracelis Cruz RN  Outcome: Progressing  Goal: Performs ADL's with improved pain control throughout shift  4/30/2024 1431 by Aracelis Cruz RN  Outcome: Progressing  4/30/2024 1426 by Aracelis Cruz RN  Outcome: Progressing  Goal: Participates in PT with improved pain control throughout the shift  4/30/2024 1431 by Aracelis Cruz RN  Outcome: Progressing  4/30/2024 1426 by Aracelis Cruz RN  Outcome: Progressing  Goal: Free from opioid side effects throughout the shift  4/30/2024 1431  by Aracelis M Cruz, RN  Outcome: Progressing  4/30/2024 1426 by Aracelis Cruz RN  Outcome: Progressing  Goal: Free from acute confusion related to pain meds throughout the shift  4/30/2024 1431 by Aracelis Cruz RN  Outcome: Progressing  4/30/2024 1426 by Aracelis Cruz RN  Outcome: Progressing   The patient's goals for the shift include      The clinical goals for the shift include Over the shift, the patient did not make progress toward the following goals. Barriers to progression include uncontrolled pain, refusal of treatment plan.  Recommendations to address these barriers include education

## 2024-04-30 NOTE — PROGRESS NOTES
"[facility] Critical Care Medicine       Date:  4/30/2024  Patient:  Batsheva Page  YOB: 1975  MRN:  [id number]   Admit Date:  4/27/2024    Chief Complaint   Patient presents with    Fever     High fever, chills, thinks she has a blood infection. Hx of many blood infections. 102.7 F at home 1/2 hour ago. Reports taking a couple Asprin, but no Tylenol. Dialysis pt. Last dialysis was yesterday. Symptoms started around Tuesday. Pt reports that it's hard to get a good BP reading on her, dialysis center always has a hard time with it.    Flu Symptoms       Patient is a 48 y.o. female admitted on 4/27/2024  8:50 PM with the following indication(s) for ICU care Septic shock.   Hospital day 2 ICU day 2d 8h     History of Present Illness:  48 y.o. 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   Patient also complained of generalized myalgias, pain at the cath insertion site and malodor from the cath site. Patient described her outpatient dialysis center as \"filthy\" and noted improper care and access of her HD cath on multiple occasions by the staff there.     ED course:  Patient was hypotensive in the ER 76/36, was administered 250 ml of crystalloids, and a loading dose of vancomycin and zosyn. 2 sets of blood cultures were drawn.     Labs: WBC 21K with Left shift. Creatinine 7, Lactate 2.4, K- 6.6  She was sent to the MICU with a peripheral IV but no central catheters and no vasopressors.  Nephrology consulted for urgent dialysis.     Interval ICU Events:  4/28: Patient went into a narrow complex tachycardia this AM with rates up to 200's.  Norepinephrine gtt had been started for hypotension, turned off when patient " became tachycardic.  IV metoprolol given.  Patient currently in NSR with MAP>60 off vasopressors.  Mg was 1.5, replaced and redraw pending.  K 4.7 this morning, repeat pending.     4/29: Patient tolerated dialysis yesterday.  Remains off pressors with improvement in blood pressures.  Did have a couple episodes of non sustained SVT overnight. She is still having chronic pain issues, discussed medication regimen with patient and bedside RN, will adjust as needed. Spoke with ID Dr Soto this morning.  Will continue Vanco/Zosyn, monitor cultures, and await ECHO before making decision on tunneled catheter.     4/30: No acute events overnight.  HDS.  Hg 6.9 today, will transfuse 1 unit PRBC. BC 2/2 positive for MRSA. LAURA ordered. Spoke with ID who will reach out to IR regarding removing tunneled line. Tablo unable to be run through femoral line.  Spoke with nephrology who states to use permacath in left subclavian for dialysis today.    Medical History:  Past Medical History:   Diagnosis Date    Aortic valve replaced 2022    CHF (congestive heart failure) (Multi)     Chronic pain     Coronary artery disease     Disease of thyroid gland     ESRD (end stage renal disease) (Multi)     H/O mitral valve replacement 2013    and 2022    Heart disease     History of transfusion     Hypertension     Mitral valve regurgitation     Seizures (Multi)     Stroke (Multi)      Past Surgical History:   Procedure Laterality Date    AORTIC VALVE REPLACEMENT  2020    bioprosthetic    CORONARY ARTERY BYPASS GRAFT      IR CVC TUNNELED  09/09/2022    IR CVC TUNNELED 9/9/2022 [facility] [place] INPATIENT LEGAC[facility]    IR CVC TUNNELED  12/28/2022    IR CVC TUNNELED 12/28/2022 [facility] [place] INPATIENT LEGACY    MITRAL VALVE REPLACEMENT  2013    bioprosthetic    MITRAL VALVE REPLACEMENT  2020    bioprosthetic    PARATHYROIDECTOMY      US GUIDED PERCUTANEOUS PLACEMENT  07/14/2022    US GUIDED PERCUTANEOUS PLACEMENT [facility]     Medications Prior to Admission    Medication Sig Dispense Refill Last Dose    aspirin 81 mg EC tablet Take 1 tablet (81 mg) by mouth once daily. 30 tablet 2 4/27/2024    atorvastatin (Lipitor) 40 mg tablet Take 1 tablet (40 mg) by mouth once daily.   4/27/2024    buPROPion (Wellbutrin) 100 mg tablet Take 1 tablet (100 mg) by mouth every other day. 30 tablet 3 Past Week    calcitriol (Rocaltrol) 0.25 mcg capsule Take 2 capsules (0.5 mcg) by mouth once daily.   4/27/2024    cloNIDine (Catapres) 0.1 mg tablet Take 1 tablet (0.1 mg) by mouth every 8 hours. 90 tablet 3 4/27/2024    epoetin brandy-epbx (RETACRIT) 20,000 unit/mL solution Inject 6,440 Units under the skin 3 times a week. Do not start before January 17, 2024. 30 mL 2 Past Week    ergocalciferol (Vitamin D-2) 1.25 MG (53494 UT) capsule Take 1 capsule (1,250 mcg) by mouth 1 (one) time per week.   Past Week    hydrALAZINE (Apresoline) 50 mg tablet Take 1 tablet (50 mg) by mouth 3 times a day.   4/27/2024    levETIRAcetam (Keppra) 500 mg tablet Take 1.5 tablets (750 mg) by mouth once daily at bedtime.   4/27/2024    metoprolol succinate XL (Toprol-XL) 50 mg 24 hr tablet TAKE 1 TABLET BY MOUTH TWICE DAILY 60 tablet 0 4/27/2024    oxyCODONE-acetaminophen (Percocet) 5-325 mg tablet Take 1 tablet by mouth every 6 hours as needed for moderate pain (4 - 6). 5 tablet 0 4/27/2024    pregabalin (Lyrica) 25 mg capsule Take 2 capsules (50 mg) by mouth 2 times a day. 120 capsule 0 4/27/2024    promethazine (Phenergan) 25 mg tablet Take 1 tablet (25 mg) by mouth once daily as needed.   4/27/2024    sodium zirconium cyclosilicate (Lokelma) 10 gram packet DISSOLVE 1 PACKET INTO 45ML OF WATER TAKE DAILY BEFORE DINNER. ADMINSTER OTHER MEDICTIONS AT LEAST 2 HOURS BEFORE OR 2 HOURS AFTER LOKELMA 30 each 0 Past Month    acetaminophen (Tylenol) 325 mg tablet Take 2 tablets (650 mg) by mouth every 6 hours as needed for mild pain (1 - 3). 30 tablet 0 Unknown     Kayexalate, Metoclopramide hcl, Prochlorperazine, Zofran  [ondansetron hcl], and Ondansetron  Social History     Tobacco Use    Smoking status: Never    Smokeless tobacco: Never   Vaping Use    Vaping status: Never Used   Substance Use Topics    Alcohol use: Never    Drug use: Never     Family History   Problem Relation Name Age of Onset    No Known Problems Mother      No Known Problems Father         Hospital Medications:             Current Facility-Administered Medications:     acetaminophen (Tylenol) tablet 650 mg, 650 mg, oral, q6h PRN, Alex Alcocer, PA-C    aspirin EC tablet 81 mg, 81 mg, oral, Daily, Alex Serranos, PA-C, 81 mg at 04/29/24 0944    atorvastatin (Lipitor) tablet 40 mg, 40 mg, oral, Nightly, Alex Serranos, PA-C, 40 mg at 04/29/24 2035    buPROPion (Wellbutrin) tablet 100 mg, 100 mg, oral, Every other day, Alex Alcocer PA-C    calcitriol (Rocaltrol) capsule 0.5 mcg, 0.5 mcg, oral, Daily, Alex Alcocer PA-C, 0.5 mcg at 04/29/24 0942    dextrose 50 % injection 12.5 g, 12.5 g, intravenous, q15 min PRN, ORI Zarate-CNP    dextrose 50 % injection 25 g, 25 g, intravenous, q15 min PRN, ORI Zarate-SAL    diphenhydrAMINE (BENADryl) injection 25 mg, 25 mg, intravenous, q6h PRN, ORI Zarate-CNP, 25 mg at 04/29/24 1606    epoetin brandy-epbx (RETACRIT) injection 6,500 Units, 6,500 Units, subcutaneous, Once per day on Monday Wednesday Friday, ROSA Jacinto-C, 6,500 Units at 04/29/24 0900    ergocalciferol (Vitamin D-2) capsule 1,250 mcg, 1,250 mcg, oral, Every Sunday, Alex Alcocer PA-C    glucagon (Glucagen) injection 1 mg, 1 mg, intramuscular, q15 min PRN, ANGELA Zarate    glucagon (Glucagen) injection 1 mg, 1 mg, intramuscular, q15 min PRN, Ivory Puente APRN-CNP    heparin (porcine) injection 5,000 Units, 5,000 Units, subcutaneous, q8h [facility], Alex Alcocer PA-C, 5,000 Units at 04/30/24 0535    heparin 1,000 unit/mL injection 1,000 Units, 1,000 Units, intra-catheter, After Dialysis, Zhou Pedersen MD, 2,100 Units at  04/29/24 0027    heparin 1,000 unit/mL injection 1,000 Units, 1,000 Units, intra-catheter, After Dialysis, Zhou Pedersen MD, 1,500 Units at 04/29/24 0814    HYDROmorphone (Dilaudid) injection 0.2 mg, 0.2 mg, intravenous, q4h PRN, ANGELA Zarate    levETIRAcetam (Keppra) tablet 500 mg, 500 mg, oral, Nightly, ANGELA Zarate    metoprolol tartrate (Lopressor) tablet 12.5 mg, 12.5 mg, oral, BID, ANGELA Zarate    oxyCODONE-acetaminophen (Percocet) 5-325 mg per tablet 1 tablet, 1 tablet, oral, q6h PRN, Alex E Hipps, PA-C, 1 tablet at 04/29/24 0227    piperacillin-tazobactam-dextrose (Zosyn) IV 2.25 g, 2.25 g, intravenous, q8h, Alex E Hipps, PA-C, Stopped at 04/30/24 0605    polyethylene glycol (Glycolax, Miralax) packet 17 g, 17 g, oral, Daily PRN, ANGELA Zarate    pregabalin (Lyrica) capsule 50 mg, 50 mg, oral, BID, Alex E Hipps, PA-C, 50 mg at 04/29/24 2035    promethazine (Phenergan) tablet 25 mg, 25 mg, oral, Daily PRN, Alex E Hipps, PA-C, 25 mg at 04/29/24 1809    vancomycin (Vancocin) pharmacy to dose - pharmacy monitoring, , miscellaneous, Daily PRN, Adama Soto MD    vancomycin-diluent combo no.1 (Xellia) IVPB 750 mg, 750 mg, intravenous, Once, Adama Soto MD    Review of Systems:  14 point review of systems was completed and negative except for those specially mention in my HPI    Physical Exam:    Heart Rate:  [66-97]   Temp:  [36.7 °C (98.1 °F)-37.3 °C (99.1 °F)]   Resp:  [10-26]   BP: ()/(29-77)   Weight:  [69.4 kg (153 lb)-69.9 kg (154 lb 1.6 oz)]   SpO2:  [79 %-100 %]     Physical Exam  Constitutional:       General: She is not in acute distress.  HENT:      Head: Normocephalic and atraumatic.   Eyes:      Extraocular Movements: Extraocular movements intact.      Conjunctiva/sclera: Conjunctivae normal.   Cardiovascular:      Rate and Rhythm: Normal rate and regular rhythm.      Pulses:           Radial pulses are 2+ on the right side and 2+ on the left  side.        Dorsalis pedis pulses are 1+ on the right side and 1+ on the left side.   Pulmonary:      Breath sounds: Normal breath sounds and air entry.   Abdominal:      General: Bowel sounds are normal.      Palpations: Abdomen is soft.   Musculoskeletal:      Cervical back: Normal range of motion and neck supple.      Right lower leg: No edema.      Left lower leg: No edema.      Comments: 5/5 strength to all extremities   Skin:     General: Skin is warm and dry.      Capillary Refill: Capillary refill takes less than 2 seconds.   Neurological:      Mental Status: She is alert and oriented to person, place, and time.      GCS: GCS eye subscore is 4. GCS verbal subscore is 5. GCS motor subscore is 6.      Cranial Nerves: Cranial nerves 2-12 are intact.   Psychiatric:         Behavior: Behavior is cooperative.         Objective:    I have reviewed all medications, laboratory results, and imaging pertinent for today's encounter.           Intake/Output Summary (Last 24 hours) at 4/30/2024 0946  Last data filed at 4/29/2024 1439  Gross per 24 hour   Intake 220.5 ml   Output --   Net 220.5 ml       Results for orders placed or performed during the hospital encounter of 04/27/24 (from the past 24 hour(s))   Transthoracic Echo (TTE) Complete   Result Value Ref Range    AV pk peggy 3.56 m/s    AV mn grad 27.0 mmHg    MV E/A ratio 0.99     Tricuspid annular plane systolic excursion 1.8 cm    LV Biplane EF 62 %    LA vol index A/L 26.2 ml/m2    RV free wall pk S' 12.50 cm/s    RVSP 29.6 mmHg    LVIDd 3.93 cm    AV pk grad 50.7 mmHg    LV A4C EF 59.6    POCT GLUCOSE   Result Value Ref Range    POCT Glucose 111 (H) 74 - 99 mg/dL   POCT GLUCOSE   Result Value Ref Range    POCT Glucose 113 (H) 74 - 99 mg/dL   POCT GLUCOSE   Result Value Ref Range    POCT Glucose 126 (H) 74 - 99 mg/dL   POCT GLUCOSE   Result Value Ref Range    POCT Glucose 129 (H) 74 - 99 mg/dL   POCT GLUCOSE   Result Value Ref Range    POCT Glucose 115 (H) 74 -  99 mg/dL   CBC and Auto Differential   Result Value Ref Range    WBC 5.4 4.4 - 11.3 x10*3/uL    nRBC 0.0 0.0 - 0.0 /100 WBCs    RBC 2.70 (L) 4.00 - 5.20 x10*6/uL    Hemoglobin 6.9 (L) 12.0 - 16.0 g/dL    Hematocrit 23.8 (L) 36.0 - 46.0 %    MCV 88 80 - 100 fL    MCH 25.6 (L) 26.0 - 34.0 pg    MCHC 29.0 (L) 32.0 - 36.0 g/dL    RDW 18.8 (H) 11.5 - 14.5 %    Platelets 143 (L) 150 - 450 x10*3/uL    Neutrophils % 73.7 40.0 - 80.0 %    Immature Granulocytes %, Automated 0.4 0.0 - 0.9 %    Lymphocytes % 11.1 13.0 - 44.0 %    Monocytes % 10.9 2.0 - 10.0 %    Eosinophils % 3.3 0.0 - 6.0 %    Basophils % 0.6 0.0 - 2.0 %    Neutrophils Absolute 3.97 1.20 - 7.70 x10*3/uL    Immature Granulocytes Absolute, Automated 0.02 0.00 - 0.70 x10*3/uL    Lymphocytes Absolute 0.60 (L) 1.20 - 4.80 x10*3/uL    Monocytes Absolute 0.59 0.10 - 1.00 x10*3/uL    Eosinophils Absolute 0.18 0.00 - 0.70 x10*3/uL    Basophils Absolute 0.03 0.00 - 0.10 x10*3/uL   Basic Metabolic Panel   Result Value Ref Range    Glucose 107 (H) 65 - 99 mg/dL    Sodium 131 (L) 133 - 145 mmol/L    Potassium 4.4 3.4 - 5.1 mmol/L    Chloride 93 (L) 97 - 107 mmol/L    Bicarbonate 23 (L) 24 - 31 mmol/L    Urea Nitrogen 32 (H) 8 - 25 mg/dL    Creatinine 5.50 (H) 0.40 - 1.60 mg/dL    eGFR 9 (L) >60 mL/min/1.73m*2    Calcium 8.4 (L) 8.5 - 10.4 mg/dL    Anion Gap 15 <=19 mmol/L   Magnesium   Result Value Ref Range    Magnesium 2.30 1.60 - 3.10 mg/dL   Phosphorus   Result Value Ref Range    Phosphorus 3.9 2.5 - 4.5 mg/dL   Calcium, ionized   Result Value Ref Range    POCT Calcium, Ionized 1.09 (L) 1.1 - 1.33 mmol/L   Morphology   Result Value Ref Range    RBC Morphology See Below     Polychromasia Mild     Hypochromia Mild    Type and screen   Result Value Ref Range    ABO TYPE A     Rh TYPE NEG     ANTIBODY SCREEN NEG    POCT GLUCOSE   Result Value Ref Range    POCT Glucose 104 (H) 74 - 99 mg/dL       Assessment/Plan:    I am currently managing this critically ill patient for  the following problems:    Neuro/Psych/Pain Ctrl/Sedation:  #Chronic pain  #Hx seizures  -Neuro checks per protocol  -Patient is A&Ox's 3  -Continue home lyrica and PRN percocet  -PRN dilaudid   -Continue home keppra  -Monitor CAM-ICU    Respiratory/ENT:  -Currently on RA  -Continuous pulse oximetry, maintain SPO2>92%  -Encourage IS  -OOB to chair    Cardiovascular:  #Septic shock resolving  #Narrow complex tachycardia, likely SVT  #Hx endocarditis with aortic (2020) and mitral (2013/2020) valve replacements  #Hx HTN  -ECHO 4/29:  1. Left ventricular systolic function is normal with a 65-70% estimated ejection fraction.   2. Spectral Doppler shows an impaired relaxation pattern of left ventricular diastolic filling.   3. There is a normally functioning mitral valve prosthesis.   4. There is a bioprosthetic aortic valve.  -Continuous cardiac monitoring  -Maintain goal MAP>60  -Currently off pressors  -Currently in sinus rhythm  -LAURA ordered  -Anbx as below  -Monitor and optimize electrolytes, keep K>4, Mg>2  -Start metoprolol tartrate at 12.5mg BID (takes 50XL BID at home)  -Home hydralazine and clonidine on hold  -Continue home Asa and statin    GI:  -Diet: Regular   -PRN phenergan for nausea  -GI prophylaxis: none  -Bowel regimen: Miralax PRN  -BM 4/29    Renal/Volume Status (Intra & Extravascular):  #ESRD on HD  #Hyperkalemia K 6.6 on admission  #AG metabolic acidosis 2/2 lactic acidosis resovled  -Nephrology consulted for RRT, okay to use tunneled line for dialysis per nephrology  -Renal function: 32/5.50  -K 4.4 today  -Daily RFP, Mg, Phos  -Replace electrolytes PRN    Endocrine  #Hypoglycemia improved  -Monitor blood glucose daily with labs and PRN  -Goal BS<180  -Hypoglycemic protocol  -TSH/T4 WNL    Infectious Disease:  #Leukocytosis  #Hx MRSA dialysis catheter infection/endocarditis  -Afebrile  -WBC 21.5>14.1>7  -Continue IV vanco/zosyn (4/28-**)  -BC pending  -ID consulted, appreciate recs  -Per ID we will  continue anbx as above, LAURA ordered, and ID reaching out to IR to make plan for tunneled line removal  -Trialysis line inserted 4/28 due to pressor requirement, need for access    Heme/Onc:  #Chronic anemia  -Baseline Hg~8.0  -HH: 6.9 today, 1 unit PRBCs ordered  -Daily CBC  -Continue home epoetin  -PT/INR 14/1.4 on admission  -Monitor for s/s of bleeding  -Transfuse of Hg <7  -VTE prophylaxis: Heparin SQ    Derm/MSK:  -PT/OT when stable  -ICU skin protocol    Ethics/Code Status:  -DNR/DNI    :  DVT Prophylaxis: Heparin SQ  GI Prophylaxis: none  Bowel Regimen: Miralax PRN  Diet: regular  CVC: Rt groin trialysis, left subclavian tunneled line  [place]: no  [place]: no  Restraints: no  Dispo: ICU    Plan discussed with: Dr Tolentino  Critical Care Time:  45 minutes  Ivory Puente APRN-CNP  Pulmonary and Critical Care Medicine

## 2024-04-30 NOTE — PROGRESS NOTES
Patient not medically clear. Patient followed by ID, cardiology and nephrology. Cultures positive for MRSA. At this time the discharge plan is to return home with no skilled services. Will follow.      04/30/24 6397   Discharge Planning   Home or Post Acute Services None   Patient expects to be discharged to: Home   Does the patient need discharge transport arranged? No

## 2024-04-30 NOTE — PROGRESS NOTES
INFECTIOUS DISEASES PROGRESS NOTE    Consulted / following patient for:  Fever, sepsis, presumed dialysis catheter infection    Subjective   Interval History:   Patient states she feels slightly better, still fatigued and generalized myalgia.  Is willing to once again consider peritoneal dialysis, but remains very reluctant.    Objective   PHYSICAL EXAMINATION  Vital signs:  Visit Vitals  /65   Pulse 68   Temp 36.5 °C (97.7 °F) (Oral)   Resp 15   No vasopressor agents in use  General: Not toxic, no acute distress  HEENT:  No scleral icterus or conjunctival suffusion, oral mucosa moist  Nodes:  Negative  Chest: Tunneled dialysis catheter in the left subclavian position without erythema, suppuration, or tenderness.  Lungs clear to auscultation  Heart:  S1, S2 crisp.  Prominent systolic ejection murmur both at the apex and the base.  No diastolic murmur appreciated  Abdomen:  Soft, nontender. No palpable organs or masses  Extremities:  No cords, phlebitis, cellulitis.  Triple-lumen dialysis catheter in the right groin  Neurologic:  Alert.  Grossly non-focal.   Skin: No mucocutaneous signs of infectious endocarditis    Relevant Results  WBC 21,500 ---> 14,100 ---> 6600 ---> 5400    Results from last 72 hours   Lab Units 04/28/24  0507   AST U/L 12   ALT U/L <5*   ALK PHOS U/L 69   BILIRUBIN TOTAL mg/dL 0.4     Microbiology:  Blood (4/27): MRSA X2  Blood (4/30): Ordered X2  Imaging:  CXR images personally reviewed: Vague density at the left heart border, doubt pneumonia  TTE: No mention of valvular vegetations  LAURA: Ordered        ASSESSMENT:  MRSA septicemia/history of MRSA dialysis catheter infection  As expected, blood cultures are once again growing MRSA.  LAURA has been ordered although there are no physical stigmata of infectious endocarditis.  Patient says she will consider peritoneal dialysis, but remains very reluctant because of remote adverse experience.  Achievement of vascular access for dialysis has  been extremely challenging, and I would like to temporize until Dr. Lott is available to discuss and explore alternatives.  At this point she is hemodynamically stable, afebrile, with resolved leukocytosis, and will add rifampin to her vancomycin and repeat blood cultures during dialysis today.  Plan discussed in detail with Ms Puente and Dr. Zhou Pedersen        PLANS:  -   Stop Zosyn  -   Continue vancomycin, pharmacy dosing  -   Add oral rifampin for synergy  -   Await LAURA  -   Repeat blood cultures on dialysis today, 1 from the right groin and one from the chest catheter  -    Will review 5/1 with Dr. Oren Soto MD  [facility]  Office:  [phone]

## 2024-04-30 NOTE — CONSULTS
Inpatient consult to Cardiology  Consult performed by: Fabian Skaggs DO  Consult ordered by: ORI Zarate-CNP  Reason for consult: Infectious endocarditis        History Of Present Illness:    Batsheva Page is a 48 y.o. female presenting with generalized malaise/fatigue.  She is well-known to our practice with bioprosthetic mitral and aortic valves and history of prosthetic mitral valve endocarditis treated with long-term antibiotics. She has previously been adamantly against a repeat sternotomy. She has a history of end-stage renal disease and is on hemodialysis.  She was hospitalized in January, had second thoughts about valve replacement if necessary.  She was here with a MRSA bacteremia thought to been from her dialysis line.  Unfortunately due to poor vascular access this line had to be replaced over a wire versus a clean stick.  We did a transesophageal echocardiogram which did show a small mobile echodensity on the mitral valve associated with a microperforation.  We thought this to be remnants of previous infection.  She was treated with antibiotics empirically.  She presented earlier this month with generalized malaise and fatigue, blood cultures were negative.  After electrolyte disturbances were corrected she was discharged.  She presents back with similar symptoms only more severe.  Her presentation was 1 of septic shock, blood cultures positive for MRSA.  Source is presumed to be the dialysis catheter.  We have been asked to see her to consider transesophageal echocardiogram     Last Recorded Vitals:  Vitals:    04/30/24 1345 04/30/24 1400 04/30/24 1415 04/30/24 1430   BP: 115/68 125/55 (!) 132/45 144/67   BP Location:       Patient Position:       Pulse: 73 73 82 85   Resp: 18 25 20 17   Temp:   36.7 °C (98.1 °F)    TempSrc:   Oral    SpO2: 99% 97% 99% 100%   Weight:       Height:           Last Labs:  CBC - 4/30/2024:  3:50 AM  5.4 6.9 143    23.8      CMP - 4/30/2024:  3:50 AM  8.4  7.2 12 --- 0.4   3.9 3.3 <5 69      PTT - 4/28/2024:  5:07 AM  1.4   14.0 32.6     Hemoglobin A1C   Date/Time Value Ref Range Status   12/23/2022 05:12 PM 4.6 % Final     Comment:          Diagnosis of Diabetes-Adults   Non-Diabetic: < or = 5.6%   Increased risk for developing diabetes: 5.7-6.4%   Diagnostic of diabetes: > or = 6.5%  .       Monitoring of Diabetes                Age (y)     Therapeutic Goal (%)   Adults:          >18           <7.0   Pediatrics:    13-18           <7.5                   7-12           <8.0                   0- 6            7.5-8.5   American Diabetes Association. Diabetes Care 33(S1), Jan 2010.     09/05/2022 03:04 AM 4.2 % Final     Comment:          Diagnosis of Diabetes-Adults   Non-Diabetic: < or = 5.6%   Increased risk for developing diabetes: 5.7-6.4%   Diagnostic of diabetes: > or = 6.5%  .       Monitoring of Diabetes                Age (y)     Therapeutic Goal (%)   Adults:          >18           <7.0   Pediatrics:    13-18           <7.5                   7-12           <8.0                   0- 6            7.5-8.5   American Diabetes Association. Diabetes Care 33(S1), Jan 2010.       VLDL   Date/Time Value Ref Range Status   09/05/2022 03:04 AM 45 (H) 0 - 40 mg/dL Final      Last I/O:  I/O last 3 completed shifts:  In: 520.5 (7.5 mL/kg) [P.O.:120; IV Piggyback:400.5]  Out: - (0 mL/kg)   Weight: 69.4 kg     Past Cardiology Tests (Last 3 Years):  EKG:  ECG 12 lead 04/04/2024      ECG 12 lead 01/08/2024    Echo:  Transthoracic Echo (TTE) Complete 04/29/2024      Transesophageal Echo (LAURA) 01/12/2024      Transthoracic Echo (TTE) Complete 01/10/2024    Ejection Fractions:  EF   Date/Time Value Ref Range Status   01/10/2024 02:35 PM 63       Cath:  No results found for this or any previous visit from the past 1095 days.    Stress Test:  No results found for this or any previous visit from the past 1095 days.    Cardiac Imaging:  No results found for this or any previous visit  from the past 1095 days.      Past Medical History:  She has a past medical history of Aortic valve replaced (2022), CHF (congestive heart failure) (Multi), Chronic pain, Coronary artery disease, Disease of thyroid gland, ESRD (end stage renal disease) (Multi), H/O mitral valve replacement (2013), Heart disease, History of transfusion, Hypertension, Mitral valve regurgitation, Seizures (Multi), and Stroke (Multi).    Past Surgical History:  She has a past surgical history that includes IR CVC tunneled (09/09/2022); IR CVC tunneled (12/28/2022); US guided percutaneous placement (07/14/2022); Parathyroidectomy; Coronary artery bypass graft; Mitral valve replacement (2013); Aortic valve replacement (2020); and Mitral valve replacement (2020).      Social History:  She reports that she has never smoked. She has never used smokeless tobacco. She reports that she does not drink alcohol and does not use drugs.    Family History:  Family History   Problem Relation Name Age of Onset    No Known Problems Mother      No Known Problems Father          Allergies:  Kayexalate, Metoclopramide hcl, Prochlorperazine, Zofran [ondansetron hcl], and Ondansetron    Inpatient Medications:  Scheduled medications   Medication Dose Route Frequency    aspirin  81 mg oral Daily    atorvastatin  40 mg oral Nightly    buPROPion  100 mg oral Every other day    calcitriol  0.5 mcg oral Daily    epoetin brandy-epbx  6,500 Units subcutaneous Once per day on Monday Wednesday Friday    ergocalciferol  1,250 mcg oral Every Sunday    heparin (porcine)  5,000 Units subcutaneous q8h [facility]    heparin  1,000 Units intra-catheter After Dialysis    heparin  1,000 Units intra-catheter After Dialysis    levETIRAcetam  500 mg oral Nightly    metoprolol tartrate  12.5 mg oral BID    pregabalin  50 mg oral BID    rifAMPin  300 mg oral q8h    vancomycin-diluent combo no.1  750 mg intravenous Once     PRN medications   Medication    acetaminophen    dextrose     dextrose    diphenhydrAMINE    glucagon    glucagon    HYDROmorphone    oxyCODONE-acetaminophen    polyethylene glycol    promethazine    vancomycin     Continuous Medications   Medication Dose Last Rate     Outpatient Medications:  Current Outpatient Medications   Medication Instructions    acetaminophen (Tylenol) 325 mg tablet Take 2 tablets (650 mg) by mouth every 6 hours as needed for mild pain (1 - 3).    aspirin 81 mg, oral, Daily    atorvastatin (LIPITOR) 40 mg, oral, Daily    buPROPion (WELLBUTRIN) 100 mg, oral, Every other day    calcitriol (ROCALTROL) 0.5 mcg, oral, Daily    cloNIDine (CATAPRES) 0.1 mg, oral, Every 8 hours scheduled    epoetin brandy-epbx (RETACRIT) 100 Units/kg, subcutaneous, 3 times weekly    ergocalciferol (Vitamin D-2) 1.25 MG (73855 UT) capsule 1 capsule, oral, Once Weekly    hydrALAZINE (APRESOLINE) 50 mg, oral, 3 times daily    levETIRAcetam (KEPPRA) 750 mg, oral, Nightly    metoprolol succinate XL (Toprol-XL) 50 mg 24 hr tablet TAKE 1 TABLET BY MOUTH TWICE DAILY    oxyCODONE-acetaminophen (Percocet) 5-325 mg tablet Take 1 tablet by mouth every 6 hours as needed for moderate pain (4 - 6).    pregabalin (LYRICA) 50 mg, oral, 2 times daily    promethazine (PHENERGAN) 25 mg, oral, Daily PRN    sodium zirconium cyclosilicate (Lokelma) 10 gram packet DISSOLVE 1 PACKET INTO 45ML OF WATER TAKE DAILY BEFORE DINNER. ADMINSTER OTHER MEDICTIONS AT LEAST 2 HOURS BEFORE OR 2 HOURS AFTER LOKELMA       Physical Exam:   Gen: NAD   Neck: no JVD, carotid upstroke is brisk and without delay   Heart: rrr, s1s2+ no mrg   Lungs: CTA   Ext: warm no edema     Assessment/Plan   Septic shock  MRSA bacteremia  End-stage renal disease on hemodialysis  Bioprosthetic MVR/AVR  History of prosthetic mitral valve endocarditis    Reasonable to consider another transesophageal echocardiogram to assess for bioprosthetic involvement.  Historically she has required general anesthesia for this.  She is unsure whether or  not she wants to go through with this again and wishes to think about it.  Will make her n.p.o. preemptively.  Will start to work on the logistics.  Site Assessment Clean;Dry;Intact 04/30/24 1200   Proximal Lumen Status Heparin locked;Capped 04/30/24 1200   Distal Lumen Status Heparin locked;Capped 04/30/24 1200   Dressing Type Antimicrobial patch;Transparent 04/30/24 1200   Dressing Status Clean;Dry;Occlusive 04/30/24 1200   Dressing Change Due 05/04/24 04/30/24 1200   Number of days: 113       Hemodialysis Cath 04/28/24 Triple lumen Right Femoral (Active)   Site Assessment Clean;Dry;Intact 04/30/24 1200   Proximal Lumen Status Heparin locked;Capped 04/30/24 1200   Medial Lumen Status Blood return noted;Flushed;Normal saline locked;Capped 04/30/24 1200   Distal Lumen Status Heparin locked;Capped 04/30/24 1200   Dressing Type Antimicrobial patch;Transparent 04/30/24 1200   Dressing Status Clean;Dry;Occlusive 04/30/24 1200   Dressing Change Due 05/05/24 04/30/24 1200   Number of days: 2       Code Status:  DNR and No Intubation    I spent 45 minutes in the professional and overall care of this patient.        Fabian Skaggs,

## 2024-04-30 NOTE — PROGRESS NOTES
"Batsheva Page is a 48 y.o. female on day 2 of admission presenting with Sepsis, due to unspecified organism, unspecified whether acute organ dysfunction present (Multi).    Subjective   Patient seen for end-stage kidney disease and dialysis therapy blood cultures came back MRSA clinically feels fine she is off pressors no fever no chills overnight events noted       Objective     Physical Exam  Neck:      Vascular: No carotid bruit.   Cardiovascular:      Rate and Rhythm: Normal rate and regular rhythm.      Heart sounds: Murmur heard.      No friction rub. No gallop.   Pulmonary:      Breath sounds: No wheezing, rhonchi or rales.   Chest:      Chest wall: No tenderness.   Abdominal:      General: There is no distension.      Tenderness: There is no abdominal tenderness. There is no guarding or rebound.   Musculoskeletal:         General: No swelling or tenderness.      Cervical back: Neck supple.      Right lower leg: No edema.      Left lower leg: No edema.   Lymphadenopathy:      Cervical: No cervical adenopathy.         Last Recorded Vitals  Blood pressure 115/65, pulse 68, temperature 36.5 °C (97.7 °F), temperature source Oral, resp. rate 15, height 1.727 m (5' 8\"), weight 69.9 kg (154 lb 1.6 oz), SpO2 100%.    Intake/Output last 3 Shifts:  I/O last 3 completed shifts:  In: 520.5 (7.5 mL/kg) [P.O.:120; IV Piggyback:400.5]  Out: - (0 mL/kg)   Weight: 69.4 kg     Current Facility-Administered Medications:     acetaminophen (Tylenol) tablet 650 mg, 650 mg, oral, q6h PRN, Alex E Hipps, PA-C    aspirin EC tablet 81 mg, 81 mg, oral, Daily, Alex E Hipps, PA-C, 81 mg at 04/30/24 1004    atorvastatin (Lipitor) tablet 40 mg, 40 mg, oral, Nightly, Alex E Hipps, PA-C, 40 mg at 04/29/24 2035    buPROPion (Wellbutrin) tablet 100 mg, 100 mg, oral, Every other day, Alex E Hipps, PA-C, 100 mg at 04/30/24 1004    calcitriol (Rocaltrol) capsule 0.5 mcg, 0.5 mcg, oral, Daily, Alex E Hipps, PA-C, 0.5 mcg at 04/30/24 1004    " dextrose 50 % injection 12.5 g, 12.5 g, intravenous, q15 min PRN, ANGELA Zarate    dextrose 50 % injection 25 g, 25 g, intravenous, q15 min PRN, ANGELA Zarate    diphenhydrAMINE (BENADryl) injection 25 mg, 25 mg, intravenous, q6h PRN, ANGELA Zarate, 25 mg at 04/30/24 1005    epoetin brandy-epbx (RETACRIT) injection 6,500 Units, 6,500 Units, subcutaneous, Once per day on Monday Wednesday Friday, Alex Alcocer PA-C, 6,500 Units at 04/29/24 0900    ergocalciferol (Vitamin D-2) capsule 1,250 mcg, 1,250 mcg, oral, Every Sunday, Alex Alcocer PA-C    glucagon (Glucagen) injection 1 mg, 1 mg, intramuscular, q15 min PRN, ANGELA Zarate    glucagon (Glucagen) injection 1 mg, 1 mg, intramuscular, q15 min PRN, ANGELA Zarate    heparin (porcine) injection 5,000 Units, 5,000 Units, subcutaneous, q8h KIMBERLY, Alex Alcocer PA-C, 5,000 Units at 04/30/24 0535    heparin 1,000 unit/mL injection 1,000 Units, 1,000 Units, intra-catheter, After Dialysis, Zhou Pedersen MD, 2,100 Units at 04/29/24 0027    heparin 1,000 unit/mL injection 1,000 Units, 1,000 Units, intra-catheter, After Dialysis, Zhou Pedersen MD, 3,000 Units at 04/30/24 1237    HYDROmorphone (Dilaudid) injection 0.2 mg, 0.2 mg, intravenous, q4h PRN, ANGELA Zarate    levETIRAcetam (Keppra) tablet 500 mg, 500 mg, oral, Nightly, ANGELA Zarate    metoprolol tartrate (Lopressor) tablet 12.5 mg, 12.5 mg, oral, BID, ORI Zarate-SAL    oxyCODONE-acetaminophen (Percocet) 5-325 mg per tablet 1 tablet, 1 tablet, oral, q6h PRN, Alex Alcocer PA-C, 1 tablet at 04/29/24 0227    polyethylene glycol (Glycolax, Miralax) packet 17 g, 17 g, oral, Daily PRN, ORI Zarate-CNP    pregabalin (Lyrica) capsule 50 mg, 50 mg, oral, BID, Alex Alcocer PA-C, 50 mg at 04/30/24 1004    promethazine (Phenergan) tablet 25 mg, 25 mg, oral, Daily PRN, Alex Alcocer PA-C, 25 mg at 04/29/24 1809    rifAMPin  (Rifadin) capsule 300 mg, 300 mg, oral, q8h, Adama Soto MD    vancomycin (Vancocin) pharmacy to dose - pharmacy monitoring, , miscellaneous, Daily PRN, Adama Soto MD    vancomycin-diluent combo no.1 (Xellia) IVPB 750 mg, 750 mg, intravenous, Once, Adama Soto MD   Relevant Results    Results for orders placed or performed during the hospital encounter of 04/27/24 (from the past 96 hour(s))   CBC and Auto Differential   Result Value Ref Range    WBC 21.5 (H) 4.4 - 11.3 x10*3/uL    nRBC 0.0 0.0 - 0.0 /100 WBCs    RBC 3.72 (L) 4.00 - 5.20 x10*6/uL    Hemoglobin 9.6 (L) 12.0 - 16.0 g/dL    Hematocrit 32.4 (L) 36.0 - 46.0 %    MCV 87 80 - 100 fL    MCH 25.8 (L) 26.0 - 34.0 pg    MCHC 29.6 (L) 32.0 - 36.0 g/dL    RDW 18.9 (H) 11.5 - 14.5 %    Platelets 253 150 - 450 x10*3/uL    Neutrophils % 91.6 40.0 - 80.0 %    Immature Granulocytes %, Automated 0.6 0.0 - 0.9 %    Lymphocytes % 3.1 13.0 - 44.0 %    Monocytes % 4.0 2.0 - 10.0 %    Eosinophils % 0.4 0.0 - 6.0 %    Basophils % 0.3 0.0 - 2.0 %    Neutrophils Absolute 19.74 (H) 1.20 - 7.70 x10*3/uL    Immature Granulocytes Absolute, Automated 0.13 0.00 - 0.70 x10*3/uL    Lymphocytes Absolute 0.66 (L) 1.20 - 4.80 x10*3/uL    Monocytes Absolute 0.87 0.10 - 1.00 x10*3/uL    Eosinophils Absolute 0.08 0.00 - 0.70 x10*3/uL    Basophils Absolute 0.06 0.00 - 0.10 x10*3/uL   Comprehensive metabolic panel   Result Value Ref Range    Glucose 99 65 - 99 mg/dL    Sodium 128 (L) 133 - 145 mmol/L    Potassium 6.6 (HH) 3.4 - 5.1 mmol/L    Chloride 83 (L) 97 - 107 mmol/L    Bicarbonate 24 24 - 31 mmol/L    Urea Nitrogen 31 (H) 8 - 25 mg/dL    Creatinine 7.70 (H) 0.40 - 1.60 mg/dL    eGFR 6 (L) >60 mL/min/1.73m*2    Calcium 8.5 8.5 - 10.4 mg/dL    Albumin 4.1 3.5 - 5.0 g/dL    Alkaline Phosphatase 100 35 - 125 U/L    Total Protein 9.0 (H) 5.9 - 7.9 g/dL    AST 17 5 - 40 U/L    Bilirubin, Total 0.4 0.1 - 1.2 mg/dL    ALT 5 5 - 40 U/L    Anion Gap >19 (H) <=19 mmol/L   Blood Culture     Specimen: Peripheral Venipuncture; Blood culture   Result Value Ref Range    Blood Culture Methicillin Resistant Staphylococcus aureus (MRSA) (AA)     BLOOD CULTURE BOTTLE  Positive Aerobic Bottle     Gram Stain Gram positive cocci, clusters (AA)        Susceptibility    Methicillin Resistant Staphylococcus aureus (MRSA) - MICROSCAN     Clindamycin  Susceptible      Erythromycin  Susceptible      Oxacillin  Resistant      Tetracycline  Susceptible      Trimethoprim/Sulfamethoxazole  Susceptible      Vancomycin  Susceptible    Blood Gas Lactic Acid, Venous   Result Value Ref Range    POCT Lactate, Venous 2.5 (H) 0.4 - 2.0 mmol/L   Blood Culture    Specimen: Peripheral Venipuncture; Blood culture   Result Value Ref Range    Blood Culture       Identification and susceptibility testing to follow    Blood Culture Staphylococcus aureus (AA)     BLOOD CULTURE BOTTLE  Positive Aerobic Bottle     Gram Stain Gram positive cocci, clusters (AA)     Gram Stain Gram positive cocci, clusters (AA)    Sars-CoV-2 and Influenza A/B PCR   Result Value Ref Range    Flu A Result Not Detected Not Detected    Flu B Result Not Detected Not Detected    Coronavirus 2019, PCR Not Detected Not Detected   Blood Gas Lactic Acid, Venous   Result Value Ref Range    POCT Lactate, Venous 2.2 (H) 0.4 - 2.0 mmol/L   POCT GLUCOSE   Result Value Ref Range    POCT Glucose 119 (H) 74 - 99 mg/dL   CBC and Auto Differential   Result Value Ref Range    WBC 14.1 (H) 4.4 - 11.3 x10*3/uL    nRBC 0.0 0.0 - 0.0 /100 WBCs    RBC 3.05 (L) 4.00 - 5.20 x10*6/uL    Hemoglobin 7.9 (L) 12.0 - 16.0 g/dL    Hematocrit 26.7 (L) 36.0 - 46.0 %    MCV 88 80 - 100 fL    MCH 25.9 (L) 26.0 - 34.0 pg    MCHC 29.6 (L) 32.0 - 36.0 g/dL    RDW 19.0 (H) 11.5 - 14.5 %    Platelets 191 150 - 450 x10*3/uL    Neutrophils % 85.2 40.0 - 80.0 %    Immature Granulocytes %, Automated 0.6 0.0 - 0.9 %    Lymphocytes % 5.5 13.0 - 44.0 %    Monocytes % 7.0 2.0 - 10.0 %    Eosinophils % 1.3 0.0 -  6.0 %    Basophils % 0.4 0.0 - 2.0 %    Neutrophils Absolute 12.01 (H) 1.20 - 7.70 x10*3/uL    Immature Granulocytes Absolute, Automated 0.08 0.00 - 0.70 x10*3/uL    Lymphocytes Absolute 0.77 (L) 1.20 - 4.80 x10*3/uL    Monocytes Absolute 0.99 0.10 - 1.00 x10*3/uL    Eosinophils Absolute 0.19 0.00 - 0.70 x10*3/uL    Basophils Absolute 0.05 0.00 - 0.10 x10*3/uL   Magnesium   Result Value Ref Range    Magnesium 1.50 (L) 1.60 - 3.10 mg/dL   Vancomycin   Result Value Ref Range    Vancomycin 43.9 (H) 10.0 - 20.0 ug/mL   Comprehensive metabolic panel   Result Value Ref Range    Glucose 60 (L) 65 - 99 mg/dL    Sodium 133 133 - 145 mmol/L    Potassium 4.7 3.4 - 5.1 mmol/L    Chloride 88 (L) 97 - 107 mmol/L    Bicarbonate 21 (L) 24 - 31 mmol/L    Urea Nitrogen 35 (H) 8 - 25 mg/dL    Creatinine 7.80 (H) 0.40 - 1.60 mg/dL    eGFR 6 (L) >60 mL/min/1.73m*2    Calcium 7.8 (L) 8.5 - 10.4 mg/dL    Albumin 3.1 (L) 3.5 - 5.0 g/dL    Alkaline Phosphatase 69 35 - 125 U/L    Total Protein 7.2 5.9 - 7.9 g/dL    AST 12 5 - 40 U/L    Bilirubin, Total 0.4 0.1 - 1.2 mg/dL    ALT <5 (L) 5 - 40 U/L    Anion Gap >19 (H) <=19 mmol/L   Phosphorus   Result Value Ref Range    Phosphorus 2.7 2.5 - 4.5 mg/dL   Coagulation Screen   Result Value Ref Range    Protime 14.0 (H) 9.3 - 12.7 seconds    INR 1.4 (H) 0.9 - 1.2    aPTT 32.6 (H) 22.0 - 32.5 seconds   POCT GLUCOSE   Result Value Ref Range    POCT Glucose 77 74 - 99 mg/dL   Troponin T   Result Value Ref Range    Troponin T, High Sensitivity 71 (HH) <=14 ng/L   Creatine Kinase   Result Value Ref Range    Creatine Kinase 47 24 - 195 U/L   MRSA Surveillance for Vancomycin De-escalation, PCR    Specimen: Anterior Nares; Swab   Result Value Ref Range    MRSA PCR Detected (A) Not Detected   BLOOD GAS VENOUS FULL PANEL   Result Value Ref Range    POCT pH, Venous 7.32 (L) 7.33 - 7.43 pH    POCT pCO2, Venous 43 41 - 51 mm Hg    POCT pO2, Venous 42 35 - 45 mm Hg    POCT SO2, Venous 72 45 - 75 %    POCT Oxy  Hemoglobin, Venous 69.4 45.0 - 75.0 %    POCT Hematocrit Calculated, Venous 25.0 (L) 36.0 - 46.0 %    POCT Sodium, Venous 127 (L) 136 - 145 mmol/L    POCT Potassium, Venous 5.7 (H) 3.5 - 5.3 mmol/L    POCT Chloride, Venous 93 (L) 98 - 107 mmol/L    POCT Ionized Calicum, Venous 1.16 1.10 - 1.33 mmol/L    POCT Glucose, Venous 84 74 - 99 mg/dL    POCT Lactate, Venous 0.9 0.4 - 2.0 mmol/L    POCT Base Excess, Venous -3.7 (L) -2.0 - 3.0 mmol/L    POCT HCO3 Calculated, Venous 22.2 22.0 - 26.0 mmol/L    POCT Hemoglobin, Venous 8.4 (L) 12.0 - 16.0 g/dL    POCT Anion Gap, Venous 18.0 10.0 - 25.0 mmol/L    Patient Temperature 37.0 degrees Celsius    FiO2 21 %   Magnesium   Result Value Ref Range    Magnesium 2.40 1.60 - 3.10 mg/dL   Basic metabolic panel   Result Value Ref Range    Glucose 90 65 - 99 mg/dL    Sodium 133 133 - 145 mmol/L    Potassium 5.7 (H) 3.4 - 5.1 mmol/L    Chloride 87 (L) 97 - 107 mmol/L    Bicarbonate 20 (L) 24 - 31 mmol/L    Urea Nitrogen 40 (H) 8 - 25 mg/dL    Creatinine 8.40 (H) 0.40 - 1.60 mg/dL    eGFR 5 (L) >60 mL/min/1.73m*2    Calcium 8.9 8.5 - 10.4 mg/dL    Anion Gap >19 (H) <=19 mmol/L   TSH   Result Value Ref Range    Thyroid Stimulating Hormone 2.12 0.27 - 4.20 mIU/L   T4, free   Result Value Ref Range    Thyroxine, Free 0.90 0.90 - 1.70 ng/dL   POCT GLUCOSE   Result Value Ref Range    POCT Glucose 83 74 - 99 mg/dL   POCT GLUCOSE   Result Value Ref Range    POCT Glucose 75 74 - 99 mg/dL   POCT GLUCOSE   Result Value Ref Range    POCT Glucose 88 74 - 99 mg/dL   CBC and Auto Differential   Result Value Ref Range    WBC 6.6 4.4 - 11.3 x10*3/uL    nRBC 0.0 0.0 - 0.0 /100 WBCs    RBC 2.88 (L) 4.00 - 5.20 x10*6/uL    Hemoglobin 7.4 (L) 12.0 - 16.0 g/dL    Hematocrit 24.9 (L) 36.0 - 46.0 %    MCV 87 80 - 100 fL    MCH 25.7 (L) 26.0 - 34.0 pg    MCHC 29.7 (L) 32.0 - 36.0 g/dL    RDW 18.7 (H) 11.5 - 14.5 %    Platelets 145 (L) 150 - 450 x10*3/uL    Neutrophils % 85.2 40.0 - 80.0 %    Immature  Granulocytes %, Automated 0.3 0.0 - 0.9 %    Lymphocytes % 6.5 13.0 - 44.0 %    Monocytes % 7.7 2.0 - 10.0 %    Eosinophils % 0.0 0.0 - 6.0 %    Basophils % 0.3 0.0 - 2.0 %    Neutrophils Absolute 5.66 1.20 - 7.70 x10*3/uL    Immature Granulocytes Absolute, Automated 0.02 0.00 - 0.70 x10*3/uL    Lymphocytes Absolute 0.43 (L) 1.20 - 4.80 x10*3/uL    Monocytes Absolute 0.51 0.10 - 1.00 x10*3/uL    Eosinophils Absolute 0.00 0.00 - 0.70 x10*3/uL    Basophils Absolute 0.02 0.00 - 0.10 x10*3/uL   Basic Metabolic Panel   Result Value Ref Range    Glucose 130 (H) 65 - 99 mg/dL    Sodium 132 (L) 133 - 145 mmol/L    Potassium 3.7 3.4 - 5.1 mmol/L    Chloride 94 (L) 97 - 107 mmol/L    Bicarbonate 25 24 - 31 mmol/L    Urea Nitrogen 13 8 - 25 mg/dL    Creatinine 3.20 (H) 0.40 - 1.60 mg/dL    eGFR 17 (L) >60 mL/min/1.73m*2    Calcium 8.3 (L) 8.5 - 10.4 mg/dL    Anion Gap 13 <=19 mmol/L   Magnesium   Result Value Ref Range    Magnesium 1.90 1.60 - 3.10 mg/dL   Phosphorus   Result Value Ref Range    Phosphorus 2.8 2.5 - 4.5 mg/dL   POCT GLUCOSE   Result Value Ref Range    POCT Glucose 109 (H) 74 - 99 mg/dL   Vancomycin   Result Value Ref Range    Vancomycin 17.1 10.0 - 20.0 ug/mL   POCT GLUCOSE   Result Value Ref Range    POCT Glucose 97 74 - 99 mg/dL   CBC and Auto Differential   Result Value Ref Range    WBC 7.0 4.4 - 11.3 x10*3/uL    nRBC 0.0 0.0 - 0.0 /100 WBCs    RBC 3.14 (L) 4.00 - 5.20 x10*6/uL    Hemoglobin 8.0 (L) 12.0 - 16.0 g/dL    Hematocrit 26.7 (L) 36.0 - 46.0 %    MCV 85 80 - 100 fL    MCH 25.5 (L) 26.0 - 34.0 pg    MCHC 30.0 (L) 32.0 - 36.0 g/dL    RDW 18.9 (H) 11.5 - 14.5 %    Platelets 151 150 - 450 x10*3/uL    Neutrophils % 77.9 40.0 - 80.0 %    Immature Granulocytes %, Automated 0.3 0.0 - 0.9 %    Lymphocytes % 9.4 13.0 - 44.0 %    Monocytes % 11.6 2.0 - 10.0 %    Eosinophils % 0.4 0.0 - 6.0 %    Basophils % 0.4 0.0 - 2.0 %    Neutrophils Absolute 5.45 1.20 - 7.70 x10*3/uL    Immature Granulocytes Absolute,  Automated 0.02 0.00 - 0.70 x10*3/uL    Lymphocytes Absolute 0.66 (L) 1.20 - 4.80 x10*3/uL    Monocytes Absolute 0.81 0.10 - 1.00 x10*3/uL    Eosinophils Absolute 0.03 0.00 - 0.70 x10*3/uL    Basophils Absolute 0.03 0.00 - 0.10 x10*3/uL   Renal function panel   Result Value Ref Range    Glucose 110 (H) 65 - 99 mg/dL    Sodium 132 (L) 133 - 145 mmol/L    Potassium 4.3 3.4 - 5.1 mmol/L    Chloride 92 (L) 97 - 107 mmol/L    Bicarbonate 22 (L) 24 - 31 mmol/L    Urea Nitrogen 19 8 - 25 mg/dL    Creatinine 4.00 (H) 0.40 - 1.60 mg/dL    eGFR 13 (L) >60 mL/min/1.73m*2    Calcium 9.0 8.5 - 10.4 mg/dL    Phosphorus 4.3 2.5 - 4.5 mg/dL    Albumin 3.3 (L) 3.5 - 5.0 g/dL    Anion Gap 18 <=19 mmol/L   Magnesium   Result Value Ref Range    Magnesium 2.40 1.60 - 3.10 mg/dL   Transthoracic Echo (TTE) Complete   Result Value Ref Range    AV pk peggy 3.56 m/s    AV mn grad 27.0 mmHg    MV E/A ratio 0.99     Tricuspid annular plane systolic excursion 1.8 cm    LV Biplane EF 62 %    LA vol index A/L 26.2 ml/m2    RV free wall pk S' 12.50 cm/s    RVSP 29.6 mmHg    LVIDd 3.93 cm    AV pk grad 50.7 mmHg    LV A4C EF 59.6    POCT GLUCOSE   Result Value Ref Range    POCT Glucose 111 (H) 74 - 99 mg/dL   POCT GLUCOSE   Result Value Ref Range    POCT Glucose 113 (H) 74 - 99 mg/dL   POCT GLUCOSE   Result Value Ref Range    POCT Glucose 126 (H) 74 - 99 mg/dL   POCT GLUCOSE   Result Value Ref Range    POCT Glucose 129 (H) 74 - 99 mg/dL   POCT GLUCOSE   Result Value Ref Range    POCT Glucose 115 (H) 74 - 99 mg/dL   CBC and Auto Differential   Result Value Ref Range    WBC 5.4 4.4 - 11.3 x10*3/uL    nRBC 0.0 0.0 - 0.0 /100 WBCs    RBC 2.70 (L) 4.00 - 5.20 x10*6/uL    Hemoglobin 6.9 (L) 12.0 - 16.0 g/dL    Hematocrit 23.8 (L) 36.0 - 46.0 %    MCV 88 80 - 100 fL    MCH 25.6 (L) 26.0 - 34.0 pg    MCHC 29.0 (L) 32.0 - 36.0 g/dL    RDW 18.8 (H) 11.5 - 14.5 %    Platelets 143 (L) 150 - 450 x10*3/uL    Neutrophils % 73.7 40.0 - 80.0 %    Immature Granulocytes  %, Automated 0.4 0.0 - 0.9 %    Lymphocytes % 11.1 13.0 - 44.0 %    Monocytes % 10.9 2.0 - 10.0 %    Eosinophils % 3.3 0.0 - 6.0 %    Basophils % 0.6 0.0 - 2.0 %    Neutrophils Absolute 3.97 1.20 - 7.70 x10*3/uL    Immature Granulocytes Absolute, Automated 0.02 0.00 - 0.70 x10*3/uL    Lymphocytes Absolute 0.60 (L) 1.20 - 4.80 x10*3/uL    Monocytes Absolute 0.59 0.10 - 1.00 x10*3/uL    Eosinophils Absolute 0.18 0.00 - 0.70 x10*3/uL    Basophils Absolute 0.03 0.00 - 0.10 x10*3/uL   Basic Metabolic Panel   Result Value Ref Range    Glucose 107 (H) 65 - 99 mg/dL    Sodium 131 (L) 133 - 145 mmol/L    Potassium 4.4 3.4 - 5.1 mmol/L    Chloride 93 (L) 97 - 107 mmol/L    Bicarbonate 23 (L) 24 - 31 mmol/L    Urea Nitrogen 32 (H) 8 - 25 mg/dL    Creatinine 5.50 (H) 0.40 - 1.60 mg/dL    eGFR 9 (L) >60 mL/min/1.73m*2    Calcium 8.4 (L) 8.5 - 10.4 mg/dL    Anion Gap 15 <=19 mmol/L   Magnesium   Result Value Ref Range    Magnesium 2.30 1.60 - 3.10 mg/dL   Phosphorus   Result Value Ref Range    Phosphorus 3.9 2.5 - 4.5 mg/dL   Calcium, ionized   Result Value Ref Range    POCT Calcium, Ionized 1.09 (L) 1.1 - 1.33 mmol/L   Morphology   Result Value Ref Range    RBC Morphology See Below     Polychromasia Mild     Hypochromia Mild    Type and screen   Result Value Ref Range    ABO TYPE A     Rh TYPE NEG     ANTIBODY SCREEN NEG    POCT GLUCOSE   Result Value Ref Range    POCT Glucose 104 (H) 74 - 99 mg/dL   Prepare RBC: 1 Units   Result Value Ref Range    PRODUCT CODE Y3088C53     Unit Number [id number]     Unit ABO A     Unit RH NEG     XM INTEP COMP     Dispense Status IS     Blood Expiration Date May 02, 2024 23:59 EDT     PRODUCT BLOOD TYPE 0600     UNIT VOLUME 350        Assessment/Plan   And stage kidney disease continue dialysis Tuesday Thursday Saturday  Hyperkalemia resolved  Septic shock improving continue antibiotic therapy per infectious disease we will make a decision regarding removing catheters tomorrow when the  final cultures are back also awaiting LAURA  Most likely line infection in her dialysis catheter  Anemia of chronic kidney disease  Hypertension  Aortic and mitral valve replacements                  Zhou Peedrsen MD

## 2024-04-30 NOTE — CARE PLAN
The patient's goals for the shift include      The clinical goals for the shift include Patient will tolerate TABLO without complications    Over the shift, the patient did make progress toward the following goals.   Problem: Pain - Adult  Goal: Verbalizes/displays adequate comfort level or baseline comfort level  Outcome: Progressing  Flowsheets (Taken 4/30/2024 1426)  Verbalizes/displays adequate comfort level or baseline comfort level:   Encourage patient to monitor pain and request assistance   Assess pain using appropriate pain scale   Administer analgesics based on type and severity of pain and evaluate response   Implement non-pharmacological measures as appropriate and evaluate response   Consider cultural and social influences on pain and pain management   Notify Licensed Independent Practitioner if interventions unsuccessful or patient reports new pain     Problem: Safety - Adult  Goal: Free from fall injury  Outcome: Progressing  Flowsheets (Taken 4/30/2024 1426)  Free from fall injury:   Instruct family/caregiver on patient safety   Based on caregiver fall risk screen, instruct family/caregiver to ask for assistance with transferring infant if caregiver noted to have fall risk factors     Problem: Discharge Planning  Goal: Discharge to home or other facility with appropriate resources  Outcome: Progressing  Flowsheets (Taken 4/30/2024 1426)  Discharge to home or other facility with appropriate resources:   Identify barriers to discharge with patient and caregiver   Arrange for needed discharge resources and transportation as appropriate   Identify discharge learning needs (meds, wound care, etc)   Refer to discharge planning if patient needs post-hospital services based on physician order or complex needs related to functional status, cognitive ability or social support system   Arrange for interpreters to assist at discharge as needed     Problem: Chronic Conditions and Co-morbidities  Goal: Patient's  chronic conditions and co-morbidity symptoms are monitored and maintained or improved  Outcome: Progressing  Flowsheets (Taken 4/30/2024 1426)  Care Plan - Patient's Chronic Conditions and Co-Morbidity Symptoms are Monitored and Maintained or Improved:   Monitor and assess patient's chronic conditions and comorbid symptoms for stability, deterioration, or improvement   Collaborate with multidisciplinary team to address chronic and comorbid conditions and prevent exacerbation or deterioration   Update acute care plan with appropriate goals if chronic or comorbid symptoms are exacerbated and prevent overall improvement and discharge     Problem: Skin  Goal: Decreased wound size/increased tissue granulation at next dressing change  Outcome: Progressing  Flowsheets (Taken 4/30/2024 1426)  Decreased wound size/increased tissue granulation at next dressing change:   Promote sleep for wound healing   Utilize specialty bed per algorithm   Protective dressings over bony prominences  Goal: Participates in plan/prevention/treatment measures  Outcome: Progressing  Flowsheets (Taken 4/30/2024 1426)  Participates in plan/prevention/treatment measures:   Elevate heels   Increase activity/out of bed for meals  Goal: Prevent/manage excess moisture  Outcome: Progressing  Flowsheets (Taken 4/30/2024 1426)  Prevent/manage excess moisture:   Cleanse incontinence/protect with barrier cream   Moisturize dry skin   Follow provider orders for dressing changes   Monitor for/manage infection if present  Goal: Prevent/minimize sheer/friction injuries  Outcome: Progressing  Flowsheets (Taken 4/30/2024 1426)  Prevent/minimize sheer/friction injuries:   Complete micro-shifts as needed if patient unable. Adjust patient position to relieve pressure points, not a full turn   Increase activity/out of bed for meals   Use pull sheet   HOB 30 degrees or less   Turn/reposition every 2 hours/use positioning/transfer devices   Utilize specialty bed per  algorithm  Goal: Promote/optimize nutrition  Outcome: Progressing  Flowsheets (Taken 4/30/2024 1426)  Promote/optimize nutrition:   Monitor/record intake including meals   Offer water/supplements/favorite foods   Reassess MST if dietician not consulted  Goal: Promote skin healing  Outcome: Progressing  Flowsheets (Taken 4/30/2024 1426)  Promote skin healing:   Assess skin/pad under line(s)/device(s)   Protective dressings over bony prominences   Turn/reposition every 2 hours/use positioning/transfer devices   Ensure correct size (line/device) and apply per  instructions   Rotate device position/do not position patient on device     Problem: Pain  Goal: Takes deep breaths with improved pain control throughout the shift  Outcome: Progressing  Goal: Turns in bed with improved pain control throughout the shift  Outcome: Progressing  Goal: Walks with improved pain control throughout the shift  Outcome: Progressing  Goal: Performs ADL's with improved pain control throughout shift  Outcome: Progressing  Goal: Participates in PT with improved pain control throughout the shift  Outcome: Progressing  Goal: Free from opioid side effects throughout the shift  Outcome: Progressing  Goal: Free from acute confusion related to pain meds throughout the shift  Outcome: Progressing

## 2024-05-01 ENCOUNTER — ANESTHESIA EVENT (OUTPATIENT)
Dept: CARDIOLOGY | Facility: HOSPITAL | Age: 49
DRG: 314 | End: 2024-05-01
Payer: MEDICARE

## 2024-05-01 ENCOUNTER — ANESTHESIA (OUTPATIENT)
Dept: CARDIOLOGY | Facility: HOSPITAL | Age: 49
DRG: 314 | End: 2024-05-01
Payer: MEDICARE

## 2024-05-01 ENCOUNTER — APPOINTMENT (OUTPATIENT)
Dept: CARDIOLOGY | Facility: HOSPITAL | Age: 49
DRG: 314 | End: 2024-05-01
Payer: MEDICARE

## 2024-05-01 LAB
ANION GAP SERPL CALC-SCNC: 14 MMOL/L
BASOPHILS # BLD AUTO: 0.02 X10*3/UL (ref 0–0.1)
BASOPHILS NFR BLD AUTO: 0.4 %
BUN SERPL-MCNC: 11 MG/DL (ref 8–25)
CALCIUM SERPL-MCNC: 8.5 MG/DL (ref 8.5–10.4)
CHLORIDE SERPL-SCNC: 96 MMOL/L (ref 97–107)
CO2 SERPL-SCNC: 24 MMOL/L (ref 24–31)
CREAT SERPL-MCNC: 3 MG/DL (ref 0.4–1.6)
EGFRCR SERPLBLD CKD-EPI 2021: 19 ML/MIN/1.73M*2
EOSINOPHIL # BLD AUTO: 0.19 X10*3/UL (ref 0–0.7)
EOSINOPHIL NFR BLD AUTO: 4 %
ERYTHROCYTE [DISTWIDTH] IN BLOOD BY AUTOMATED COUNT: 19.7 % (ref 11.5–14.5)
GLUCOSE SERPL-MCNC: 113 MG/DL (ref 65–99)
HCT VFR BLD AUTO: 27.2 % (ref 36–46)
HGB BLD-MCNC: 8.2 G/DL (ref 12–16)
IMM GRANULOCYTES # BLD AUTO: 0.03 X10*3/UL (ref 0–0.7)
IMM GRANULOCYTES NFR BLD AUTO: 0.6 % (ref 0–0.9)
LYMPHOCYTES # BLD AUTO: 0.74 X10*3/UL (ref 1.2–4.8)
LYMPHOCYTES NFR BLD AUTO: 15.4 %
MAGNESIUM SERPL-MCNC: 2 MG/DL (ref 1.6–3.1)
MCH RBC QN AUTO: 25.3 PG (ref 26–34)
MCHC RBC AUTO-ENTMCNC: 30.1 G/DL (ref 32–36)
MCV RBC AUTO: 84 FL (ref 80–100)
MONOCYTES # BLD AUTO: 0.67 X10*3/UL (ref 0.1–1)
MONOCYTES NFR BLD AUTO: 14 %
NEUTROPHILS # BLD AUTO: 3.14 X10*3/UL (ref 1.2–7.7)
NEUTROPHILS NFR BLD AUTO: 65.6 %
NRBC BLD-RTO: 0 /100 WBCS (ref 0–0)
PHOSPHATE SERPL-MCNC: 2.2 MG/DL (ref 2.5–4.5)
PLATELET # BLD AUTO: 151 X10*3/UL (ref 150–450)
POTASSIUM SERPL-SCNC: 3.6 MMOL/L (ref 3.4–5.1)
RBC # BLD AUTO: 3.24 X10*6/UL (ref 4–5.2)
SODIUM SERPL-SCNC: 134 MMOL/L (ref 133–145)
TROPONIN T SERPL-MCNC: 41 NG/L
TROPONIN T SERPL-MCNC: 42 NG/L
WBC # BLD AUTO: 4.8 X10*3/UL (ref 4.4–11.3)

## 2024-05-01 PROCEDURE — 2500000004 HC RX 250 GENERAL PHARMACY W/ HCPCS (ALT 636 FOR OP/ED)

## 2024-05-01 PROCEDURE — 93320 DOPPLER ECHO COMPLETE: CPT

## 2024-05-01 PROCEDURE — 80048 BASIC METABOLIC PNL TOTAL CA: CPT

## 2024-05-01 PROCEDURE — 2500000005 HC RX 250 GENERAL PHARMACY W/O HCPCS: Performed by: ANESTHESIOLOGIST ASSISTANT

## 2024-05-01 PROCEDURE — 99497 ADVNCD CARE PLAN 30 MIN: CPT | Performed by: NURSE PRACTITIONER

## 2024-05-01 PROCEDURE — 84100 ASSAY OF PHOSPHORUS: CPT

## 2024-05-01 PROCEDURE — 85025 COMPLETE CBC W/AUTO DIFF WBC: CPT

## 2024-05-01 PROCEDURE — 2500000001 HC RX 250 WO HCPCS SELF ADMINISTERED DRUGS (ALT 637 FOR MEDICARE OP): Performed by: INTERNAL MEDICINE

## 2024-05-01 PROCEDURE — 2500000001 HC RX 250 WO HCPCS SELF ADMINISTERED DRUGS (ALT 637 FOR MEDICARE OP)

## 2024-05-01 PROCEDURE — 36415 COLL VENOUS BLD VENIPUNCTURE: CPT

## 2024-05-01 PROCEDURE — A93312 PR ECHO HEART,TRANSESOPHAGEAL,COMPLETE: Performed by: ANESTHESIOLOGIST ASSISTANT

## 2024-05-01 PROCEDURE — 83735 ASSAY OF MAGNESIUM: CPT

## 2024-05-01 PROCEDURE — 3700000001 HC GENERAL ANESTHESIA TIME - INITIAL BASE CHARGE

## 2024-05-01 PROCEDURE — 3700000002 HC GENERAL ANESTHESIA TIME - EACH INCREMENTAL 1 MINUTE

## 2024-05-01 PROCEDURE — 2500000004 HC RX 250 GENERAL PHARMACY W/ HCPCS (ALT 636 FOR OP/ED): Performed by: ANESTHESIOLOGIST ASSISTANT

## 2024-05-01 PROCEDURE — A93312 PR ECHO HEART,TRANSESOPHAGEAL,COMPLETE: Performed by: ANESTHESIOLOGY

## 2024-05-01 PROCEDURE — 2060000001 HC INTERMEDIATE ICU ROOM DAILY

## 2024-05-01 PROCEDURE — 84484 ASSAY OF TROPONIN QUANT: CPT

## 2024-05-01 PROCEDURE — 93312 ECHO TRANSESOPHAGEAL: CPT | Performed by: INTERNAL MEDICINE

## 2024-05-01 PROCEDURE — 99223 1ST HOSP IP/OBS HIGH 75: CPT | Performed by: NURSE PRACTITIONER

## 2024-05-01 PROCEDURE — 6350000001 HC RX 635 EPOETIN >10,000 UNITS

## 2024-05-01 PROCEDURE — 93005 ELECTROCARDIOGRAM TRACING: CPT

## 2024-05-01 PROCEDURE — 99291 CRITICAL CARE FIRST HOUR: CPT

## 2024-05-01 RX ORDER — SODIUM CHLORIDE 9 MG/ML
INJECTION, SOLUTION INTRAVENOUS CONTINUOUS PRN
Status: DISCONTINUED | OUTPATIENT
Start: 2024-05-01 | End: 2024-05-01

## 2024-05-01 RX ORDER — HYDRALAZINE HYDROCHLORIDE 50 MG/1
50 TABLET, FILM COATED ORAL 3 TIMES DAILY
Status: DISCONTINUED | OUTPATIENT
Start: 2024-05-01 | End: 2024-05-08 | Stop reason: HOSPADM

## 2024-05-01 RX ORDER — PROPOFOL 10 MG/ML
INJECTION, EMULSION INTRAVENOUS AS NEEDED
Status: DISCONTINUED | OUTPATIENT
Start: 2024-05-01 | End: 2024-05-01

## 2024-05-01 RX ORDER — MIDAZOLAM HYDROCHLORIDE 1 MG/ML
INJECTION, SOLUTION INTRAMUSCULAR; INTRAVENOUS AS NEEDED
Status: DISCONTINUED | OUTPATIENT
Start: 2024-05-01 | End: 2024-05-01

## 2024-05-01 RX ORDER — VANCOMYCIN 750 MG/150ML
750 INJECTION, SOLUTION INTRAVENOUS
Status: DISCONTINUED | OUTPATIENT
Start: 2024-05-02 | End: 2024-05-04

## 2024-05-01 RX ORDER — OXYCODONE AND ACETAMINOPHEN 5; 325 MG/1; MG/1
1 TABLET ORAL EVERY 6 HOURS PRN
Status: DISCONTINUED | OUTPATIENT
Start: 2024-05-01 | End: 2024-05-08 | Stop reason: HOSPADM

## 2024-05-01 RX ORDER — HYDROXYZINE PAMOATE 25 MG/1
25 CAPSULE ORAL EVERY 8 HOURS PRN
Status: DISCONTINUED | OUTPATIENT
Start: 2024-05-01 | End: 2024-05-08 | Stop reason: HOSPADM

## 2024-05-01 RX ORDER — LORATADINE 10 MG/1
10 TABLET ORAL
Status: DISCONTINUED | OUTPATIENT
Start: 2024-05-01 | End: 2024-05-08 | Stop reason: HOSPADM

## 2024-05-01 RX ORDER — CLONIDINE HYDROCHLORIDE 0.1 MG/1
0.1 TABLET ORAL EVERY 8 HOURS SCHEDULED
Status: DISCONTINUED | OUTPATIENT
Start: 2024-05-01 | End: 2024-05-04

## 2024-05-01 RX ORDER — PRAMOXINE HYDROCHLORIDE 10 MG/ML
LOTION TOPICAL EVERY 8 HOURS PRN
Status: DISCONTINUED | OUTPATIENT
Start: 2024-05-01 | End: 2024-05-08 | Stop reason: HOSPADM

## 2024-05-01 RX ADMIN — ASPIRIN 81 MG: 81 TABLET, COATED ORAL at 08:31

## 2024-05-01 RX ADMIN — METOPROLOL TARTRATE 12.5 MG: 25 TABLET, FILM COATED ORAL at 21:04

## 2024-05-01 RX ADMIN — DIPHENHYDRAMINE HYDROCHLORIDE 25 MG: 50 INJECTION, SOLUTION INTRAMUSCULAR; INTRAVENOUS at 04:12

## 2024-05-01 RX ADMIN — PROPOFOL 30 MG: 10 INJECTION, EMULSION INTRAVENOUS at 14:55

## 2024-05-01 RX ADMIN — BENZOCAINE 1 SPRAY: 200 SPRAY DENTAL; ORAL; PERIODONTAL at 14:58

## 2024-05-01 RX ADMIN — OXYCODONE AND ACETAMINOPHEN 1 TABLET: 325; 5 TABLET ORAL at 08:31

## 2024-05-01 RX ADMIN — PROPOFOL 80 MG: 10 INJECTION, EMULSION INTRAVENOUS at 14:50

## 2024-05-01 RX ADMIN — PREGABALIN 50 MG: 50 CAPSULE ORAL at 21:03

## 2024-05-01 RX ADMIN — PREGABALIN 50 MG: 50 CAPSULE ORAL at 08:31

## 2024-05-01 RX ADMIN — DIPHENHYDRAMINE HYDROCHLORIDE 25 MG: 50 INJECTION, SOLUTION INTRAMUSCULAR; INTRAVENOUS at 21:18

## 2024-05-01 RX ADMIN — CLONIDINE HYDROCHLORIDE 0.1 MG: 0.1 TABLET ORAL at 21:03

## 2024-05-01 RX ADMIN — RIFAMPIN 300 MG: 300 CAPSULE ORAL at 06:17

## 2024-05-01 RX ADMIN — HYDRALAZINE HYDROCHLORIDE 50 MG: 50 TABLET ORAL at 21:03

## 2024-05-01 RX ADMIN — METOPROLOL TARTRATE 12.5 MG: 25 TABLET, FILM COATED ORAL at 08:31

## 2024-05-01 RX ADMIN — MIDAZOLAM HYDROCHLORIDE 2 MG: 1 INJECTION, SOLUTION INTRAMUSCULAR; INTRAVENOUS at 14:50

## 2024-05-01 RX ADMIN — EPOETIN ALFA-EPBX 6500 UNITS: 20000 INJECTION, SOLUTION INTRAVENOUS; SUBCUTANEOUS at 08:32

## 2024-05-01 RX ADMIN — HYDROMORPHONE HYDROCHLORIDE 0.2 MG: 1 INJECTION, SOLUTION INTRAMUSCULAR; INTRAVENOUS; SUBCUTANEOUS at 09:51

## 2024-05-01 RX ADMIN — HYDROMORPHONE HYDROCHLORIDE 0.2 MG: 1 INJECTION, SOLUTION INTRAMUSCULAR; INTRAVENOUS; SUBCUTANEOUS at 04:36

## 2024-05-01 RX ADMIN — HYDROMORPHONE HYDROCHLORIDE 0.2 MG: 1 INJECTION, SOLUTION INTRAMUSCULAR; INTRAVENOUS; SUBCUTANEOUS at 21:06

## 2024-05-01 RX ADMIN — CALCITRIOL CAPSULES 0.25 MCG 0.5 MCG: 0.25 CAPSULE ORAL at 08:31

## 2024-05-01 RX ADMIN — OXYCODONE AND ACETAMINOPHEN 1 TABLET: 325; 5 TABLET ORAL at 19:51

## 2024-05-01 RX ADMIN — ATORVASTATIN CALCIUM 40 MG: 40 TABLET, FILM COATED ORAL at 21:18

## 2024-05-01 RX ADMIN — LEVETIRACETAM 500 MG: 500 TABLET, FILM COATED ORAL at 21:00

## 2024-05-01 RX ADMIN — HYDROMORPHONE HYDROCHLORIDE 0.2 MG: 1 INJECTION, SOLUTION INTRAMUSCULAR; INTRAVENOUS; SUBCUTANEOUS at 01:45

## 2024-05-01 RX ADMIN — SODIUM CHLORIDE: 900 INJECTION, SOLUTION INTRAVENOUS at 14:39

## 2024-05-01 RX ADMIN — RIFAMPIN 300 MG: 300 CAPSULE ORAL at 21:02

## 2024-05-01 RX ADMIN — MIDAZOLAM HYDROCHLORIDE 2 MG: 1 INJECTION, SOLUTION INTRAMUSCULAR; INTRAVENOUS at 14:38

## 2024-05-01 RX ADMIN — Medication 20 MG: at 14:50

## 2024-05-01 SDOH — HEALTH STABILITY: MENTAL HEALTH: CURRENT SMOKER: 1

## 2024-05-01 ASSESSMENT — PAIN SCALES - GENERAL
PAINLEVEL_OUTOF10: 0 - NO PAIN
PAINLEVEL_OUTOF10: 10 - WORST POSSIBLE PAIN
PAINLEVEL_OUTOF10: 7
PAINLEVEL_OUTOF10: 10 - WORST POSSIBLE PAIN
PAINLEVEL_OUTOF10: 7
PAINLEVEL_OUTOF10: 10 - WORST POSSIBLE PAIN
PAINLEVEL_OUTOF10: 8
PAIN_LEVEL: 2
PAINLEVEL_OUTOF10: 8
PAINLEVEL_OUTOF10: 10 - WORST POSSIBLE PAIN

## 2024-05-01 ASSESSMENT — PAIN DESCRIPTION - LOCATION
LOCATION: GENERALIZED

## 2024-05-01 ASSESSMENT — PAIN DESCRIPTION - ORIENTATION: ORIENTATION: RIGHT;LEFT

## 2024-05-01 ASSESSMENT — PAIN DESCRIPTION - DESCRIPTORS
DESCRIPTORS: ACHING

## 2024-05-01 NOTE — NURSING NOTE
Pt son, Santos, called facility for update. He states he is supposed to be medical decision maker but there is no paperwork. Explained to son that without legal paperwork, decision making would default to patient's spouse, Rodrigo. Son states dad previously had stroke and does not understand enough to make decisions. I explained that dad would have to talk to a physician and defer decision making to son if that is what he wished to do. Son voices understanding

## 2024-05-01 NOTE — ANESTHESIA PREPROCEDURE EVALUATION
Patient: Batsheva Page    Procedure Information       Date/Time: 05/01/24 1400    Procedure: TRANSESOPHAGEAL ECHO (LAURA)    Location: [facility]            Relevant Problems   Anesthesia (within normal limits)      Cardiac  endocrditis   (+) Arteriosclerosis of coronary artery bypass graft   (+) Benign essential hypertension   (+) Cardiac pacemaker in situ   (+) Coronary artery disease   (+) Hypertension   (+) Paroxysmal atrial fibrillation (Multi)      Pulmonary (within normal limits)      Neuro   (+) Anxiety   (+) Depression with anxiety   (+) Major depressive disorder, recurrent, severe with psychotic symptoms (Multi)   (+) Seizure (Multi)      GI (within normal limits)      /Renal   (+) End stage renal disease (Multi)   (+) End-stage renal disease on hemodialysis (Multi)   (+) Hyponatremia      Liver (within normal limits)      Endocrine (within normal limits)      Hematology   (+) Anemia of chronic disease   (+) Anemia secondary to renal failure   (+) Iron deficiency anemia      Musculoskeletal (within normal limits)      HEENT (within normal limits)      ID   (+) MRSA (methicillin resistant Staphylococcus aureus) infection   (+) MRSA bacteremia      Skin (within normal limits)      GYN (within normal limits)       Clinical information reviewed:    Allergies  Meds               NPO Detail:  No data recorded     Physical Exam    Airway  Mallampati: II  TM distance: >3 FB  Neck ROM: full     Cardiovascular - normal exam     Dental   Comments: Implants   Pulmonary - normal exam     Abdominal - normal exam             Anesthesia Plan    History of general anesthesia?: yes  History of complications of general anesthesia?: no    ASA 3     MAC     The patient is a current smoker.  Patient was previously instructed to abstain from smoking on day of procedure.  Patient did not smoke on day of procedure.  Education provided regarding risk of obstructive sleep apnea.  intravenous induction    Anesthetic plan and risks discussed with patient.    Plan discussed with CRNA and CAA.

## 2024-05-01 NOTE — NURSING NOTE
Patient's  called for update and notified me that he wants his son, Santos, to be the primary decision maker for patient. Update provided and notified  that he would need to speak to a physician regarding this decision. Phone number verified and given to Dr. Tolentino so that he can contact .

## 2024-05-01 NOTE — NURSING NOTE
Wound rounds completed. Pt's skin intact aside from scattered scabs to BLE. Appetite is poor. Mepilex borders in place to bilateral heels and sacrum. Walks occasionally for short distances and is able to turn self in bed. Waffle overlay in place.

## 2024-05-01 NOTE — NURSING NOTE
Pt has a L chest dialysis catheter, drsg dry and intact (dated 4/30) bunched up slightly pt states drsg is in the way of her bra, without any redness, swelling or drainage. Pt also has a R femoral Mahurkar, drsg dry and intact, it does have dried up blood on top of drsg, RN states it may be removed today, if not she states she will change drsg. Pigtail has brisk blood return and flushes easily with NS, clamped and curos cap applied.

## 2024-05-01 NOTE — PROGRESS NOTES
Spiritual Care Visit    Clinical Encounter Type  Visited With: Patient  Routine Visit: Introduction  Continue Visiting: No         Values/Beliefs  Spiritual Requests During Hospitalization: [place] only    Sacramental Encounters  Communion: Does not want communion  Communion Given Indicator: No  Sacrament of Sick-Anointing: Patient declined anointing     David Peguero

## 2024-05-01 NOTE — PROGRESS NOTES
Patient not medically clear. Patient to have dialysis catheter removed tomorrow and will have a new one placed after a short break. At the time of discharge, patient will return home. TCC to assist with getting POA. Patient reports that there is one but at this time there is not one imaged into EPIC. Will follow.      05/01/24 8249   Discharge Planning   Home or Post Acute Services None   Patient expects to be discharged to: Home   Does the patient need discharge transport arranged? No

## 2024-05-01 NOTE — POST-PROCEDURE NOTE
Physician Transition of Care Summary  LAURA    Procedure Date: 5/1/2024  Attending: Fabian Skaggs DO  Resident/Fellow/Other Assistant: None    Indications:   Bacteremia    Post-procedure diagnosis:   Well seated AV and MV prostheses without evidence of vegetation    Procedure(s):   LAURA    Procedure Findings:   See above    Description of the Procedure:   The procedure was done at the bedside in the intensive care unit.  Sedation was administered via anesthesia.  A deep lidocaine spray was used for posterior oropharyngeal anesthesia.  The probe was passed without difficulty on the first attempt.  All of the necessary images were taken and mid esophageal view.  The probe was withdrawn the procedure was well-tolerated.    Complications:   None    Stents/Implants:   * No surgery found *    Anticoagulation/Antiplatelet Plan:   N/A    Estimated Blood Loss:   * No surgery found *    Anesthesia: * No surgery found * Anesthesia Staff: Anesthesia staff cannot be found from this context.    Any Specimen(s) Removed:   * Cannot find OR log *    Disposition:   ICU      Electronically signed by: Fabian Skaggs DO, 5/1/2024 3:09 PM

## 2024-05-01 NOTE — PROGRESS NOTES
"Batsheva Page is a 48 y.o. female on day 3 of admission presenting with Sepsis, due to unspecified organism, unspecified whether acute organ dysfunction present (Multi).    Subjective   Patient seen for end-stage kidney disease patient is admitted with MRSA bacteremia he is on antibiotic therapy she was dialyzed yesterday she feels okay today she is off pressors no fever no chills       Objective     Physical Exam  Neck:      Vascular: No carotid bruit.   Cardiovascular:      Rate and Rhythm: Normal rate and regular rhythm.      Heart sounds: Murmur heard.      No friction rub. No gallop.   Pulmonary:      Breath sounds: No wheezing, rhonchi or rales.   Chest:      Chest wall: No tenderness.   Abdominal:      General: There is no distension.      Tenderness: There is no abdominal tenderness. There is no guarding or rebound.   Musculoskeletal:         General: No swelling or tenderness.      Cervical back: Neck supple.      Right lower leg: No edema.      Left lower leg: No edema.   Lymphadenopathy:      Cervical: No cervical adenopathy.         Last Recorded Vitals  Blood pressure 172/76, pulse 76, temperature 36.6 °C (97.9 °F), temperature source Temporal, resp. rate 17, height 1.727 m (5' 8\"), weight 65.3 kg (143 lb 15.4 oz), SpO2 96%.    Intake/Output last 3 Shifts:  I/O last 3 completed shifts:  In: 906.7 (13 mL/kg) [P.O.:240; Blood:316.7; IV Piggyback:350]  Out: 1500 (21.6 mL/kg) [Other:1500]  Weight: 69.5 kg     Current Facility-Administered Medications:     acetaminophen (Tylenol) tablet 650 mg, 650 mg, oral, q6h PRN, Alex E Hipps, PA-C    aspirin EC tablet 81 mg, 81 mg, oral, Daily, Alex E Hipps, PA-C, 81 mg at 05/01/24 0831    atorvastatin (Lipitor) tablet 40 mg, 40 mg, oral, Nightly, Alex E Hipps, PA-C, 40 mg at 04/30/24 2140    buPROPion (Wellbutrin) tablet 100 mg, 100 mg, oral, Every other day, Alex E Hipps, PA-C, 100 mg at 04/30/24 1004    calcitriol (Rocaltrol) capsule 0.5 mcg, 0.5 mcg, oral, " Daily, Alex Alcocer PA-C, 0.5 mcg at 05/01/24 0831    dextrose 50 % injection 12.5 g, 12.5 g, intravenous, q15 min PRN, ANGELA Zarate    dextrose 50 % injection 25 g, 25 g, intravenous, q15 min PRN, ANGELA Zarate    diphenhydrAMINE (BENADryl) injection 25 mg, 25 mg, intravenous, q6h PRN, ANGELA Zarate, 25 mg at 05/01/24 0412    epoetin brandy-epbx (RETACRIT) injection 6,500 Units, 6,500 Units, subcutaneous, Once per day on Monday Wednesday Friday, Alex Alcocer PA-C, 6,500 Units at 05/01/24 0832    ergocalciferol (Vitamin D-2) capsule 1,250 mcg, 1,250 mcg, oral, Every Sunday, Alex Alcocer PA-C    glucagon (Glucagen) injection 1 mg, 1 mg, intramuscular, q15 min PRN, ANGELA Zarate    glucagon (Glucagen) injection 1 mg, 1 mg, intramuscular, q15 min PRN, ANGELA Zarate    heparin (porcine) injection 5,000 Units, 5,000 Units, subcutaneous, q8h KIMBERLY, Alex Alcocer PA-C, 5,000 Units at 04/30/24 0535    heparin 1,000 unit/mL injection 1,000 Units, 1,000 Units, intra-catheter, After Dialysis, Zhou Pedersen MD, 2,100 Units at 04/29/24 0027    heparin 1,000 unit/mL injection 1,000 Units, 1,000 Units, intra-catheter, After Dialysis, Zhou Pedersen MD, 4,200 Units at 04/30/24 1719    HYDROmorphone (Dilaudid) injection 0.2 mg, 0.2 mg, intravenous, q4h PRN, Brittany Krueger PA-C, 0.2 mg at 05/01/24 0951    hydrOXYzine pamoate (Vistaril) capsule 25 mg, 25 mg, oral, q8h PRN, ANGELA Douglass    levETIRAcetam (Keppra) tablet 500 mg, 500 mg, oral, Nightly, ANGELA Zarate, 500 mg at 04/30/24 2140    loratadine (Claritin) tablet 10 mg, 10 mg, oral, q48h PRN, ANGELA Douglass    metoprolol tartrate (Lopressor) tablet 12.5 mg, 12.5 mg, oral, BID, ANGELA Zarate, 12.5 mg at 05/01/24 0831    oxyCODONE-acetaminophen (Percocet) 5-325 mg per tablet 1 tablet, 1 tablet, oral, q6h PRN, Brittany Krueger PA-C, 1 tablet at 05/01/24 0831    polyethylene glycol  (Glycolax, Miralax) packet 17 g, 17 g, oral, Daily PRN, Ivory Puente, APRN-CNP    pramoxine (Sarna Sensitive) 1 % lotion, , Topical, q8h PRN, ORI Douglass-CNP    pregabalin (Lyrica) capsule 50 mg, 50 mg, oral, BID, Alex Serranos, PA-C, 50 mg at 05/01/24 0831    promethazine (Phenergan) tablet 25 mg, 25 mg, oral, Daily PRN, Alex Serranos, PA-C, 25 mg at 04/29/24 1809    rifAMPin (Rifadin) capsule 300 mg, 300 mg, oral, q8h, Adama Soto MD, 300 mg at 05/01/24 0617    vancomycin (Vancocin) pharmacy to dose - pharmacy monitoring, , miscellaneous, Daily PRN, Adama Soto MD    [START ON 5/2/2024] vancomycin-diluent combo no.1 (Xellia) IVPB 750 mg, 750 mg, intravenous, Once per day on Tuesday Thursday Saturday, Adama Soto MD   Relevant Results    Results for orders placed or performed during the hospital encounter of 04/27/24 (from the past 96 hour(s))   CBC and Auto Differential   Result Value Ref Range    WBC 21.5 (H) 4.4 - 11.3 x10*3/uL    nRBC 0.0 0.0 - 0.0 /100 WBCs    RBC 3.72 (L) 4.00 - 5.20 x10*6/uL    Hemoglobin 9.6 (L) 12.0 - 16.0 g/dL    Hematocrit 32.4 (L) 36.0 - 46.0 %    MCV 87 80 - 100 fL    MCH 25.8 (L) 26.0 - 34.0 pg    MCHC 29.6 (L) 32.0 - 36.0 g/dL    RDW 18.9 (H) 11.5 - 14.5 %    Platelets 253 150 - 450 x10*3/uL    Neutrophils % 91.6 40.0 - 80.0 %    Immature Granulocytes %, Automated 0.6 0.0 - 0.9 %    Lymphocytes % 3.1 13.0 - 44.0 %    Monocytes % 4.0 2.0 - 10.0 %    Eosinophils % 0.4 0.0 - 6.0 %    Basophils % 0.3 0.0 - 2.0 %    Neutrophils Absolute 19.74 (H) 1.20 - 7.70 x10*3/uL    Immature Granulocytes Absolute, Automated 0.13 0.00 - 0.70 x10*3/uL    Lymphocytes Absolute 0.66 (L) 1.20 - 4.80 x10*3/uL    Monocytes Absolute 0.87 0.10 - 1.00 x10*3/uL    Eosinophils Absolute 0.08 0.00 - 0.70 x10*3/uL    Basophils Absolute 0.06 0.00 - 0.10 x10*3/uL   Comprehensive metabolic panel   Result Value Ref Range    Glucose 99 65 - 99 mg/dL    Sodium 128 (L) 133 - 145 mmol/L    Potassium 6.6 (HH)  3.4 - 5.1 mmol/L    Chloride 83 (L) 97 - 107 mmol/L    Bicarbonate 24 24 - 31 mmol/L    Urea Nitrogen 31 (H) 8 - 25 mg/dL    Creatinine 7.70 (H) 0.40 - 1.60 mg/dL    eGFR 6 (L) >60 mL/min/1.73m*2    Calcium 8.5 8.5 - 10.4 mg/dL    Albumin 4.1 3.5 - 5.0 g/dL    Alkaline Phosphatase 100 35 - 125 U/L    Total Protein 9.0 (H) 5.9 - 7.9 g/dL    AST 17 5 - 40 U/L    Bilirubin, Total 0.4 0.1 - 1.2 mg/dL    ALT 5 5 - 40 U/L    Anion Gap >19 (H) <=19 mmol/L   Blood Culture    Specimen: Peripheral Venipuncture; Blood culture   Result Value Ref Range    Blood Culture Methicillin Resistant Staphylococcus aureus (MRSA) (AA)     BLOOD CULTURE BOTTLE  Positive Aerobic and Anaerobic Bottle     Gram Stain Gram positive cocci, clusters (AA)     Gram Stain Gram positive cocci, clusters (AA)        Susceptibility    Methicillin Resistant Staphylococcus aureus (MRSA) - MICROSCAN     Clindamycin  Susceptible      Erythromycin  Susceptible      Oxacillin  Resistant      Tetracycline  Susceptible      Trimethoprim/Sulfamethoxazole  Susceptible      Vancomycin  Susceptible    Blood Gas Lactic Acid, Venous   Result Value Ref Range    POCT Lactate, Venous 2.5 (H) 0.4 - 2.0 mmol/L   Blood Culture    Specimen: Peripheral Venipuncture; Blood culture   Result Value Ref Range    Blood Culture Staphylococcus aureus (AA)     BLOOD CULTURE BOTTLE  Positive Aerobic and Anaerobic Bottle     Gram Stain Gram positive cocci, clusters (AA)     Gram Stain Gram positive cocci, clusters (AA)    Sars-CoV-2 and Influenza A/B PCR   Result Value Ref Range    Flu A Result Not Detected Not Detected    Flu B Result Not Detected Not Detected    Coronavirus 2019, PCR Not Detected Not Detected   Blood Gas Lactic Acid, Venous   Result Value Ref Range    POCT Lactate, Venous 2.2 (H) 0.4 - 2.0 mmol/L   POCT GLUCOSE   Result Value Ref Range    POCT Glucose 119 (H) 74 - 99 mg/dL   CBC and Auto Differential   Result Value Ref Range    WBC 14.1 (H) 4.4 - 11.3 x10*3/uL    nRBC  0.0 0.0 - 0.0 /100 WBCs    RBC 3.05 (L) 4.00 - 5.20 x10*6/uL    Hemoglobin 7.9 (L) 12.0 - 16.0 g/dL    Hematocrit 26.7 (L) 36.0 - 46.0 %    MCV 88 80 - 100 fL    MCH 25.9 (L) 26.0 - 34.0 pg    MCHC 29.6 (L) 32.0 - 36.0 g/dL    RDW 19.0 (H) 11.5 - 14.5 %    Platelets 191 150 - 450 x10*3/uL    Neutrophils % 85.2 40.0 - 80.0 %    Immature Granulocytes %, Automated 0.6 0.0 - 0.9 %    Lymphocytes % 5.5 13.0 - 44.0 %    Monocytes % 7.0 2.0 - 10.0 %    Eosinophils % 1.3 0.0 - 6.0 %    Basophils % 0.4 0.0 - 2.0 %    Neutrophils Absolute 12.01 (H) 1.20 - 7.70 x10*3/uL    Immature Granulocytes Absolute, Automated 0.08 0.00 - 0.70 x10*3/uL    Lymphocytes Absolute 0.77 (L) 1.20 - 4.80 x10*3/uL    Monocytes Absolute 0.99 0.10 - 1.00 x10*3/uL    Eosinophils Absolute 0.19 0.00 - 0.70 x10*3/uL    Basophils Absolute 0.05 0.00 - 0.10 x10*3/uL   Magnesium   Result Value Ref Range    Magnesium 1.50 (L) 1.60 - 3.10 mg/dL   Vancomycin   Result Value Ref Range    Vancomycin 43.9 (H) 10.0 - 20.0 ug/mL   Comprehensive metabolic panel   Result Value Ref Range    Glucose 60 (L) 65 - 99 mg/dL    Sodium 133 133 - 145 mmol/L    Potassium 4.7 3.4 - 5.1 mmol/L    Chloride 88 (L) 97 - 107 mmol/L    Bicarbonate 21 (L) 24 - 31 mmol/L    Urea Nitrogen 35 (H) 8 - 25 mg/dL    Creatinine 7.80 (H) 0.40 - 1.60 mg/dL    eGFR 6 (L) >60 mL/min/1.73m*2    Calcium 7.8 (L) 8.5 - 10.4 mg/dL    Albumin 3.1 (L) 3.5 - 5.0 g/dL    Alkaline Phosphatase 69 35 - 125 U/L    Total Protein 7.2 5.9 - 7.9 g/dL    AST 12 5 - 40 U/L    Bilirubin, Total 0.4 0.1 - 1.2 mg/dL    ALT <5 (L) 5 - 40 U/L    Anion Gap >19 (H) <=19 mmol/L   Phosphorus   Result Value Ref Range    Phosphorus 2.7 2.5 - 4.5 mg/dL   Coagulation Screen   Result Value Ref Range    Protime 14.0 (H) 9.3 - 12.7 seconds    INR 1.4 (H) 0.9 - 1.2    aPTT 32.6 (H) 22.0 - 32.5 seconds   POCT GLUCOSE   Result Value Ref Range    POCT Glucose 77 74 - 99 mg/dL   Troponin T   Result Value Ref Range    Troponin T, High  Sensitivity 71 (HH) <=14 ng/L   Creatine Kinase   Result Value Ref Range    Creatine Kinase 47 24 - 195 U/L   MRSA Surveillance for Vancomycin De-escalation, PCR    Specimen: Anterior Nares; Swab   Result Value Ref Range    MRSA PCR Detected (A) Not Detected   BLOOD GAS VENOUS FULL PANEL   Result Value Ref Range    POCT pH, Venous 7.32 (L) 7.33 - 7.43 pH    POCT pCO2, Venous 43 41 - 51 mm Hg    POCT pO2, Venous 42 35 - 45 mm Hg    POCT SO2, Venous 72 45 - 75 %    POCT Oxy Hemoglobin, Venous 69.4 45.0 - 75.0 %    POCT Hematocrit Calculated, Venous 25.0 (L) 36.0 - 46.0 %    POCT Sodium, Venous 127 (L) 136 - 145 mmol/L    POCT Potassium, Venous 5.7 (H) 3.5 - 5.3 mmol/L    POCT Chloride, Venous 93 (L) 98 - 107 mmol/L    POCT Ionized Calicum, Venous 1.16 1.10 - 1.33 mmol/L    POCT Glucose, Venous 84 74 - 99 mg/dL    POCT Lactate, Venous 0.9 0.4 - 2.0 mmol/L    POCT Base Excess, Venous -3.7 (L) -2.0 - 3.0 mmol/L    POCT HCO3 Calculated, Venous 22.2 22.0 - 26.0 mmol/L    POCT Hemoglobin, Venous 8.4 (L) 12.0 - 16.0 g/dL    POCT Anion Gap, Venous 18.0 10.0 - 25.0 mmol/L    Patient Temperature 37.0 degrees Celsius    FiO2 21 %   Magnesium   Result Value Ref Range    Magnesium 2.40 1.60 - 3.10 mg/dL   Basic metabolic panel   Result Value Ref Range    Glucose 90 65 - 99 mg/dL    Sodium 133 133 - 145 mmol/L    Potassium 5.7 (H) 3.4 - 5.1 mmol/L    Chloride 87 (L) 97 - 107 mmol/L    Bicarbonate 20 (L) 24 - 31 mmol/L    Urea Nitrogen 40 (H) 8 - 25 mg/dL    Creatinine 8.40 (H) 0.40 - 1.60 mg/dL    eGFR 5 (L) >60 mL/min/1.73m*2    Calcium 8.9 8.5 - 10.4 mg/dL    Anion Gap >19 (H) <=19 mmol/L   TSH   Result Value Ref Range    Thyroid Stimulating Hormone 2.12 0.27 - 4.20 mIU/L   T4, free   Result Value Ref Range    Thyroxine, Free 0.90 0.90 - 1.70 ng/dL   POCT GLUCOSE   Result Value Ref Range    POCT Glucose 83 74 - 99 mg/dL   POCT GLUCOSE   Result Value Ref Range    POCT Glucose 75 74 - 99 mg/dL   POCT GLUCOSE   Result Value Ref  Range    POCT Glucose 88 74 - 99 mg/dL   CBC and Auto Differential   Result Value Ref Range    WBC 6.6 4.4 - 11.3 x10*3/uL    nRBC 0.0 0.0 - 0.0 /100 WBCs    RBC 2.88 (L) 4.00 - 5.20 x10*6/uL    Hemoglobin 7.4 (L) 12.0 - 16.0 g/dL    Hematocrit 24.9 (L) 36.0 - 46.0 %    MCV 87 80 - 100 fL    MCH 25.7 (L) 26.0 - 34.0 pg    MCHC 29.7 (L) 32.0 - 36.0 g/dL    RDW 18.7 (H) 11.5 - 14.5 %    Platelets 145 (L) 150 - 450 x10*3/uL    Neutrophils % 85.2 40.0 - 80.0 %    Immature Granulocytes %, Automated 0.3 0.0 - 0.9 %    Lymphocytes % 6.5 13.0 - 44.0 %    Monocytes % 7.7 2.0 - 10.0 %    Eosinophils % 0.0 0.0 - 6.0 %    Basophils % 0.3 0.0 - 2.0 %    Neutrophils Absolute 5.66 1.20 - 7.70 x10*3/uL    Immature Granulocytes Absolute, Automated 0.02 0.00 - 0.70 x10*3/uL    Lymphocytes Absolute 0.43 (L) 1.20 - 4.80 x10*3/uL    Monocytes Absolute 0.51 0.10 - 1.00 x10*3/uL    Eosinophils Absolute 0.00 0.00 - 0.70 x10*3/uL    Basophils Absolute 0.02 0.00 - 0.10 x10*3/uL   Basic Metabolic Panel   Result Value Ref Range    Glucose 130 (H) 65 - 99 mg/dL    Sodium 132 (L) 133 - 145 mmol/L    Potassium 3.7 3.4 - 5.1 mmol/L    Chloride 94 (L) 97 - 107 mmol/L    Bicarbonate 25 24 - 31 mmol/L    Urea Nitrogen 13 8 - 25 mg/dL    Creatinine 3.20 (H) 0.40 - 1.60 mg/dL    eGFR 17 (L) >60 mL/min/1.73m*2    Calcium 8.3 (L) 8.5 - 10.4 mg/dL    Anion Gap 13 <=19 mmol/L   Magnesium   Result Value Ref Range    Magnesium 1.90 1.60 - 3.10 mg/dL   Phosphorus   Result Value Ref Range    Phosphorus 2.8 2.5 - 4.5 mg/dL   POCT GLUCOSE   Result Value Ref Range    POCT Glucose 109 (H) 74 - 99 mg/dL   Vancomycin   Result Value Ref Range    Vancomycin 17.1 10.0 - 20.0 ug/mL   POCT GLUCOSE   Result Value Ref Range    POCT Glucose 97 74 - 99 mg/dL   CBC and Auto Differential   Result Value Ref Range    WBC 7.0 4.4 - 11.3 x10*3/uL    nRBC 0.0 0.0 - 0.0 /100 WBCs    RBC 3.14 (L) 4.00 - 5.20 x10*6/uL    Hemoglobin 8.0 (L) 12.0 - 16.0 g/dL    Hematocrit 26.7 (L) 36.0  - 46.0 %    MCV 85 80 - 100 fL    MCH 25.5 (L) 26.0 - 34.0 pg    MCHC 30.0 (L) 32.0 - 36.0 g/dL    RDW 18.9 (H) 11.5 - 14.5 %    Platelets 151 150 - 450 x10*3/uL    Neutrophils % 77.9 40.0 - 80.0 %    Immature Granulocytes %, Automated 0.3 0.0 - 0.9 %    Lymphocytes % 9.4 13.0 - 44.0 %    Monocytes % 11.6 2.0 - 10.0 %    Eosinophils % 0.4 0.0 - 6.0 %    Basophils % 0.4 0.0 - 2.0 %    Neutrophils Absolute 5.45 1.20 - 7.70 x10*3/uL    Immature Granulocytes Absolute, Automated 0.02 0.00 - 0.70 x10*3/uL    Lymphocytes Absolute 0.66 (L) 1.20 - 4.80 x10*3/uL    Monocytes Absolute 0.81 0.10 - 1.00 x10*3/uL    Eosinophils Absolute 0.03 0.00 - 0.70 x10*3/uL    Basophils Absolute 0.03 0.00 - 0.10 x10*3/uL   Renal function panel   Result Value Ref Range    Glucose 110 (H) 65 - 99 mg/dL    Sodium 132 (L) 133 - 145 mmol/L    Potassium 4.3 3.4 - 5.1 mmol/L    Chloride 92 (L) 97 - 107 mmol/L    Bicarbonate 22 (L) 24 - 31 mmol/L    Urea Nitrogen 19 8 - 25 mg/dL    Creatinine 4.00 (H) 0.40 - 1.60 mg/dL    eGFR 13 (L) >60 mL/min/1.73m*2    Calcium 9.0 8.5 - 10.4 mg/dL    Phosphorus 4.3 2.5 - 4.5 mg/dL    Albumin 3.3 (L) 3.5 - 5.0 g/dL    Anion Gap 18 <=19 mmol/L   Magnesium   Result Value Ref Range    Magnesium 2.40 1.60 - 3.10 mg/dL   Transthoracic Echo (TTE) Complete   Result Value Ref Range    AV pk peggy 3.56 m/s    AV mn grad 27.0 mmHg    MV E/A ratio 0.99     Tricuspid annular plane systolic excursion 1.8 cm    LV Biplane EF 62 %    LA vol index A/L 26.2 ml/m2    RV free wall pk S' 12.50 cm/s    RVSP 29.6 mmHg    LVIDd 3.93 cm    AV pk grad 50.7 mmHg    LV A4C EF 59.6    POCT GLUCOSE   Result Value Ref Range    POCT Glucose 111 (H) 74 - 99 mg/dL   POCT GLUCOSE   Result Value Ref Range    POCT Glucose 113 (H) 74 - 99 mg/dL   POCT GLUCOSE   Result Value Ref Range    POCT Glucose 126 (H) 74 - 99 mg/dL   POCT GLUCOSE   Result Value Ref Range    POCT Glucose 129 (H) 74 - 99 mg/dL   POCT GLUCOSE   Result Value Ref Range    POCT Glucose  115 (H) 74 - 99 mg/dL   CBC and Auto Differential   Result Value Ref Range    WBC 5.4 4.4 - 11.3 x10*3/uL    nRBC 0.0 0.0 - 0.0 /100 WBCs    RBC 2.70 (L) 4.00 - 5.20 x10*6/uL    Hemoglobin 6.9 (L) 12.0 - 16.0 g/dL    Hematocrit 23.8 (L) 36.0 - 46.0 %    MCV 88 80 - 100 fL    MCH 25.6 (L) 26.0 - 34.0 pg    MCHC 29.0 (L) 32.0 - 36.0 g/dL    RDW 18.8 (H) 11.5 - 14.5 %    Platelets 143 (L) 150 - 450 x10*3/uL    Neutrophils % 73.7 40.0 - 80.0 %    Immature Granulocytes %, Automated 0.4 0.0 - 0.9 %    Lymphocytes % 11.1 13.0 - 44.0 %    Monocytes % 10.9 2.0 - 10.0 %    Eosinophils % 3.3 0.0 - 6.0 %    Basophils % 0.6 0.0 - 2.0 %    Neutrophils Absolute 3.97 1.20 - 7.70 x10*3/uL    Immature Granulocytes Absolute, Automated 0.02 0.00 - 0.70 x10*3/uL    Lymphocytes Absolute 0.60 (L) 1.20 - 4.80 x10*3/uL    Monocytes Absolute 0.59 0.10 - 1.00 x10*3/uL    Eosinophils Absolute 0.18 0.00 - 0.70 x10*3/uL    Basophils Absolute 0.03 0.00 - 0.10 x10*3/uL   Basic Metabolic Panel   Result Value Ref Range    Glucose 107 (H) 65 - 99 mg/dL    Sodium 131 (L) 133 - 145 mmol/L    Potassium 4.4 3.4 - 5.1 mmol/L    Chloride 93 (L) 97 - 107 mmol/L    Bicarbonate 23 (L) 24 - 31 mmol/L    Urea Nitrogen 32 (H) 8 - 25 mg/dL    Creatinine 5.50 (H) 0.40 - 1.60 mg/dL    eGFR 9 (L) >60 mL/min/1.73m*2    Calcium 8.4 (L) 8.5 - 10.4 mg/dL    Anion Gap 15 <=19 mmol/L   Magnesium   Result Value Ref Range    Magnesium 2.30 1.60 - 3.10 mg/dL   Phosphorus   Result Value Ref Range    Phosphorus 3.9 2.5 - 4.5 mg/dL   Calcium, ionized   Result Value Ref Range    POCT Calcium, Ionized 1.09 (L) 1.1 - 1.33 mmol/L   Morphology   Result Value Ref Range    RBC Morphology See Below     Polychromasia Mild     Hypochromia Mild    Type and screen   Result Value Ref Range    ABO TYPE A     Rh TYPE NEG     ANTIBODY SCREEN NEG    POCT GLUCOSE   Result Value Ref Range    POCT Glucose 104 (H) 74 - 99 mg/dL   Prepare RBC: 1 Units   Result Value Ref Range    PRODUCT CODE  [id number]     Unit Number [id number]     Unit ABO A     Unit RH NEG     XM INTEP COMP     Dispense Status TR     Blood Expiration Date May 02, 2024 23:59 EDT     PRODUCT BLOOD TYPE 0600     UNIT VOLUME 350    Blood Culture    Specimen: Dialysis; Blood culture   Result Value Ref Range    Blood Culture Loaded on Instrument - Culture in progress    Blood Culture    Specimen: Central Line/Catheter (Specify below); Blood culture   Result Value Ref Range    Blood Culture Loaded on Instrument - Culture in progress    CBC and Auto Differential   Result Value Ref Range    WBC 4.8 4.4 - 11.3 x10*3/uL    nRBC 0.0 0.0 - 0.0 /100 WBCs    RBC 3.24 (L) 4.00 - 5.20 x10*6/uL    Hemoglobin 8.2 (L) 12.0 - 16.0 g/dL    Hematocrit 27.2 (L) 36.0 - 46.0 %    MCV 84 80 - 100 fL    MCH 25.3 (L) 26.0 - 34.0 pg    MCHC 30.1 (L) 32.0 - 36.0 g/dL    RDW 19.7 (H) 11.5 - 14.5 %    Platelets 151 150 - 450 x10*3/uL    Neutrophils % 65.6 40.0 - 80.0 %    Immature Granulocytes %, Automated 0.6 0.0 - 0.9 %    Lymphocytes % 15.4 13.0 - 44.0 %    Monocytes % 14.0 2.0 - 10.0 %    Eosinophils % 4.0 0.0 - 6.0 %    Basophils % 0.4 0.0 - 2.0 %    Neutrophils Absolute 3.14 1.20 - 7.70 x10*3/uL    Immature Granulocytes Absolute, Automated 0.03 0.00 - 0.70 x10*3/uL    Lymphocytes Absolute 0.74 (L) 1.20 - 4.80 x10*3/uL    Monocytes Absolute 0.67 0.10 - 1.00 x10*3/uL    Eosinophils Absolute 0.19 0.00 - 0.70 x10*3/uL    Basophils Absolute 0.02 0.00 - 0.10 x10*3/uL   Basic Metabolic Panel   Result Value Ref Range    Glucose 113 (H) 65 - 99 mg/dL    Sodium 134 133 - 145 mmol/L    Potassium 3.6 3.4 - 5.1 mmol/L    Chloride 96 (L) 97 - 107 mmol/L    Bicarbonate 24 24 - 31 mmol/L    Urea Nitrogen 11 8 - 25 mg/dL    Creatinine 3.00 (H) 0.40 - 1.60 mg/dL    eGFR 19 (L) >60 mL/min/1.73m*2    Calcium 8.5 8.5 - 10.4 mg/dL    Anion Gap 14 <=19 mmol/L   Magnesium   Result Value Ref Range    Magnesium 2.00 1.60 - 3.10 mg/dL   Phosphorus   Result Value Ref Range     Phosphorus 2.2 (L) 2.5 - 4.5 mg/dL   Troponin T   Result Value Ref Range    Troponin T, High Sensitivity 42 (HH) <=14 ng/L   Troponin T   Result Value Ref Range    Troponin T, High Sensitivity 41 (HH) <=14 ng/L       Assessment/Plan   End stage kidney disease we will plan dialysis through the permacath tomorrow morning before removal of the catheter  Hyperkalemia resolved  Septic shock improved hemodynamically continue antibiotics per infectious disease I had a long discussion with the infectious disease and interventional radiology we will plan on removing the catheter tomorrow leave guidewire in place give line holiday for few days and then reinsert another permacath since this is the only location at this time for her vascular access  Anemia of chronic kidney disease  Hypertension  Aortic and mitral valve replacements                   Zhou Pedersen MD

## 2024-05-01 NOTE — PROGRESS NOTES
Vancomycin Dosing by Pharmacy- FOLLOW-UP (HEMODIALYSIS)    Batsheva Page is a 48 y.o. year old female who Pharmacy has been consulted for vancomycin dosing for Vancomycin Indications: Endocarditis. Based on the patient's indication and renal status this patient will be dosed based on a pre-HD level of 20-25 mcg/mL.     Patient is currently on hemodialysis.    Vancomycin maintenance dose: 750 mg after each dialysis session Tues/Thurs/Sat     Vancomycin pre-HD level to be drawn 5/2 AM.     Lab Results   Component Value Date    VANCORANDOM 17.1 04/29/2024    VANCOTROUGH 27.9 (HH) 09/26/2022       Visit Vitals  BP (!) 126/47 (BP Location: Right leg, Patient Position: Lying)   Pulse 86   Temp 37.1 °C (98.7 °F) (Oral)   Resp 12        Lab Results   Component Value Date    CREATININE 3.00 (H) 05/01/2024    CREATININE 5.50 (H) 04/30/2024    CREATININE 4.00 (H) 04/29/2024    CREATININE 3.20 (H) 04/29/2024       I/O last 3 completed shifts:  In: 906.7 (13 mL/kg) [P.O.:240; Blood:316.7; IV Piggyback:350]  Out: 1500 (21.6 mL/kg) [Other:1500]  Weight: 69.5 kg     Assessment/Plan     Pre-HD level will be obtained on 5/2 at AM. May be obtained sooner if clinically indicated. If level is above goal, random level with AM labs the next morning will be obtained.   Will continue to monitor renal function daily while on vancomycin and order serum creatinine at least every 48 hours if not already ordered.  Follow for continued vancomycin needs, clinical response, and signs/symptoms of toxicity.     Radha Fallon, PharmD

## 2024-05-01 NOTE — CONSULTS
Inpatient consult to Palliative Care  Consult performed by: ORI Douglass-CNP  Consult ordered by: Alex Alcocer PA-C        History Of Present Illness  Batsheva Page is a 48 y.o. female with a very complex past medical history of end-stage renal disease on hemodialysis CABG aortic valve replacement 2020 mitral valve replacement 2013 and 2020 recurrent MRSA bacteremia is extremely poor vasculature for failed vascular accesses prior history of endocarditis.  Comes in with an acute worsening about 5 to 6 days ago started with general weakness had fever and chills for couple of days her left IJ hemodialysis permacath began hurting her she said there is drainage and malodor.    She is followed very closely by Dr. Pedersen and Dr. Soto.  Last hospitalization she was treated for MRSA septicemia her vascular access tunneled dialysis catheter in her left chest had to be replaced over wire.  Has been prior conversations about transitioning back to peritoneal dialysis she declined as she had very bad experience in the past.  And has been maintained on hemodialysis now for many years.    She tells me she was in her usual state of chronic health up until about 5 to 6 days ago she typically becomes very sick quickly when she has these staph bacteremias.  With systemic manifestations of body wide aching and pain chest discomfort feelings of malaise coupled with the aforementioned fever and chills.    She required urgent dialysis shortly after admit because of hyperkalemia.    This morning I had an extensive lengthy conversation with Dr. Pedersen regarding the patient's history and current predicament.    She is being treated for MRSA septicemia.  To be seen by interventional radiology this afternoon for hemodialysis catheter changed out over guidewire because the likelihood of obtaining another vascular access is nonexistent.  She then is to have a LAURA looking at her heart valves for possible vegetations.     Most recent lab  work reviewed showing a white count of 4.8 hemoglobin 8.2 hematocrit 27.2 platelets 251,000  Sodium 134 potassium 3.6 chloride 96 CO2 24 BUN 11 creatinine 3 gap 14 glucose 113 calcium 8.5 phosphorus 2.2 magnesium 2.0    Outpatient pain regimen consists of Percocet and Lyrica  OARRS was reviewed    She tells me she has been through multiple surgical procedures and has chronic pain as well as neuropathy of the lower extremities she has significant restless leg as well.  On a good day her symptoms are middle-of-the-road pain is 4-5 which is pretty good for her now significantly more which is very typical when she has these infections.    Palliative medicine has been consulted to assist with goals of care conversations per my conversation.  With the ICU nurse Aracelis she had a really rough night and was very stressed out borderline tearful about wanting to give up because she has been through so much.    This afternoon when I came to see her she was a little bit better.  Agreeable to continuing the treatment model of care for the time being waiting to obtain some other data.  She does not at all want to have any part of any further valve surgeries she tells me very clearly.  Remains open to ICU antibiotics and anything needed to attenuate the situation otherwise with the exception of CPR and intubation.     Symptoms (0 - 10, Best to Worst)  [place] Symptom Assessment System  Pain Score: 8    BM in last 48 hours? yes    Emotional/Psychological/Spiritual Needs  Over the past two weeks, how often has the patient been bothered by having little interest or pleasure in doing things?  occurrence (last two weeks): several days    Over the past two weeks, how often has the patient been bothered by feeling down, depressed, or hopeless?  occurrence (last two weeks): several days    Screening for spiritual needs?  No  Anglican and importance of Restorationist: no  Source for spiritual support/meaning: no    Spiritual Hx:  Are you  spiritual or Temple?   What’s your lidya background?  During difficult times in your life, what have you relied on for strength?    Music Hx:   Do you enjoy music? What type of music do you enjoy? na     Serious Illness Conversation  What is your understanding now of where you are with your illness: Very good understanding of her current illness  How much information about what is likely to be ahead with your illness  would you like from me: Full disclosure  What are your most important goals if your health situation worsens: Unclear at the moment requests better symptom control of her pain anxiety and itching  What are your biggest fears and worries about the future with your health: Finding the infection and my heart valves again  What gives you strength as you think about the future with your illness: Not sure  What abilities are so critical to your life that you can’t imagine living without them: Not sure  If you become sicker, how much are you willing to go through for the possibility of gaining more time: Unclear right now but most certainly does not want to go through heart valve surgery given she tells me that she would rather pass away then go through this  How much does your family know about your priorities and wishes: Has been there her significant other Chava as well as her daughter are aware of her wishes but she has to talk with them further    Personal/Social History   She reports that she has never smoked. She has never used smokeless tobacco. She reports that she does not drink alcohol and does not use drugs.    Functional Status  Up until recently at her usual functional capacity going to dialysis ADLs and IADLs    Caregiving/Caregiver Support  Does the patient require assistance in some or all components of his care, including coordination of medical care? Yes  If Yes, which person serves that role?  Daughter and significant other  Caregiver emotional or practical needs: Support as she is  anxious and depressed    Past Medical History  She has a past medical history of Aortic valve replaced (2022), CHF (congestive heart failure) (Multi), Chronic pain, Coronary artery disease, Disease of thyroid gland, ESRD (end stage renal disease) (Multi), H/O mitral valve replacement (2013), Heart disease, History of transfusion, Hypertension, Mitral valve regurgitation, Seizures (Multi), and Stroke (Multi).    Surgical History  She has a past surgical history that includes IR CVC tunneled (09/09/2022); IR CVC tunneled (12/28/2022); US guided percutaneous placement (07/14/2022); Parathyroidectomy; Coronary artery bypass graft; Mitral valve replacement (2013); Aortic valve replacement (2020); and Mitral valve replacement (2020).     Family History  Family History   Problem Relation Name Age of Onset    No Known Problems Mother      No Known Problems Father       Allergies  Kayexalate, Metoclopramide hcl, Prochlorperazine, Zofran [ondansetron hcl], and Ondansetron    Review of Systems  As per the HPI otherwise -10 systems reviewed     Physical Exam  Vitals and nursing note reviewed.   Constitutional:       General: She is not in acute distress.     Appearance: She is ill-appearing. She is not toxic-appearing or diaphoretic.   HENT:      Head: Normocephalic and atraumatic.      Nose: No congestion or rhinorrhea.      Mouth/Throat:      Mouth: Mucous membranes are moist.      Pharynx: No oropharyngeal exudate or posterior oropharyngeal erythema.   Eyes:      General: No scleral icterus.        Right eye: No discharge.         Left eye: No discharge.      Extraocular Movements: Extraocular movements intact.      Conjunctiva/sclera: Conjunctivae normal.      Pupils: Pupils are equal, round, and reactive to light.   Neck:      Vascular: No carotid bruit.      Comments: Seems to have faint transmitted murmur to the carotids  Cardiovascular:      Rate and Rhythm: Normal rate and regular rhythm.      Pulses: Normal pulses.       Heart sounds: Murmur heard.      No friction rub. No gallop.   Pulmonary:      Effort: Pulmonary effort is normal. No respiratory distress.      Breath sounds: Normal breath sounds. No stridor. No wheezing, rhonchi or rales.      Comments: No accessory muscle use no conversational dyspnea  Chest:      Chest wall: Tenderness (Somewhat reproducible mostly sternal little bit of radiation out from middle) present.      Comments: The upper chest permacath there looks to be a little bit of redness at the site no purulence at the time I thought however it is tender very slightly warm compared to surrounding skin  Abdominal:      General: There is no distension.      Palpations: There is no mass.      Tenderness: There is no abdominal tenderness. There is no right CVA tenderness, left CVA tenderness, guarding or rebound.      Hernia: No hernia is present.   Genitourinary:     Comments: Deferred    Anuric  Musculoskeletal:         General: No swelling, tenderness, deformity or signs of injury.      Cervical back: No rigidity or tenderness.      Right lower leg: No edema.      Left lower leg: No edema.      Comments: Supple calves no Homans' sign does have some chronic apathy to the lower extremities   Lymphadenopathy:      Cervical: No cervical adenopathy.   Skin:     General: Skin is warm and dry.      Capillary Refill: Capillary refill takes less than 2 seconds.      Coloration: Skin is not jaundiced or pale.      Findings: No bruising, erythema, lesion or rash.      Comments: Does not seem to have peripheral stigmata of endocarditis    Multiple areas of scarring on her arms and legs from sores due to chronic itching   Neurological:      General: No focal deficit present.      Mental Status: She is alert and oriented to person, place, and time.      Cranial Nerves: No cranial nerve deficit.      Sensory: Sensory deficit (Chronic neuropathy in her extremities, restless legs) present.      Comments: Able to move all  "extremities no focal weakness grasps are strong 5 out of 5 pedal push pulls 5 out of 5.  No facial asymmetry tongue protrudes midline no dysarthria equal shoulder shrug gait was not observed    Cranial nerves otherwise seem to be grossly intact to the extent that I can test hearing and vision are adequate   Psychiatric:      Comments: Admits some depression no SI or HI  Admits to anxiety and occasional attacks    Judgment is normal insight is very good  She admits to being overwhelmed at times given her \"terrible health\"         Last Recorded Vitals  Blood pressure 172/76, pulse 76, temperature 36.6 °C (97.9 °F), temperature source Temporal, resp. rate 17, height 1.727 m (5' 8\"), weight 65.3 kg (143 lb 15.4 oz), SpO2 96%.    Relevant Results  Results for orders placed or performed during the hospital encounter of 04/27/24 (from the past 24 hour(s))   Blood Culture    Specimen: Dialysis; Blood culture   Result Value Ref Range    Blood Culture Loaded on Instrument - Culture in progress    Blood Culture    Specimen: Central Line/Catheter (Specify below); Blood culture   Result Value Ref Range    Blood Culture Loaded on Instrument - Culture in progress    CBC and Auto Differential   Result Value Ref Range    WBC 4.8 4.4 - 11.3 x10*3/uL    nRBC 0.0 0.0 - 0.0 /100 WBCs    RBC 3.24 (L) 4.00 - 5.20 x10*6/uL    Hemoglobin 8.2 (L) 12.0 - 16.0 g/dL    Hematocrit 27.2 (L) 36.0 - 46.0 %    MCV 84 80 - 100 fL    MCH 25.3 (L) 26.0 - 34.0 pg    MCHC 30.1 (L) 32.0 - 36.0 g/dL    RDW 19.7 (H) 11.5 - 14.5 %    Platelets 151 150 - 450 x10*3/uL    Neutrophils % 65.6 40.0 - 80.0 %    Immature Granulocytes %, Automated 0.6 0.0 - 0.9 %    Lymphocytes % 15.4 13.0 - 44.0 %    Monocytes % 14.0 2.0 - 10.0 %    Eosinophils % 4.0 0.0 - 6.0 %    Basophils % 0.4 0.0 - 2.0 %    Neutrophils Absolute 3.14 1.20 - 7.70 x10*3/uL    Immature Granulocytes Absolute, Automated 0.03 0.00 - 0.70 x10*3/uL    Lymphocytes Absolute 0.74 (L) 1.20 - 4.80 x10*3/uL "    Monocytes Absolute 0.67 0.10 - 1.00 x10*3/uL    Eosinophils Absolute 0.19 0.00 - 0.70 x10*3/uL    Basophils Absolute 0.02 0.00 - 0.10 x10*3/uL   Basic Metabolic Panel   Result Value Ref Range    Glucose 113 (H) 65 - 99 mg/dL    Sodium 134 133 - 145 mmol/L    Potassium 3.6 3.4 - 5.1 mmol/L    Chloride 96 (L) 97 - 107 mmol/L    Bicarbonate 24 24 - 31 mmol/L    Urea Nitrogen 11 8 - 25 mg/dL    Creatinine 3.00 (H) 0.40 - 1.60 mg/dL    eGFR 19 (L) >60 mL/min/1.73m*2    Calcium 8.5 8.5 - 10.4 mg/dL    Anion Gap 14 <=19 mmol/L   Magnesium   Result Value Ref Range    Magnesium 2.00 1.60 - 3.10 mg/dL   Phosphorus   Result Value Ref Range    Phosphorus 2.2 (L) 2.5 - 4.5 mg/dL   Troponin T   Result Value Ref Range    Troponin T, High Sensitivity 42 (HH) <=14 ng/L   Troponin T   Result Value Ref Range    Troponin T, High Sensitivity 41 (HH) <=14 ng/L     No results found.  Transthoracic Echo (TTE) Complete    Result Date: 4/29/2024           [place] [address]            Phone [phone] TRANSTHORACIC ECHOCARDIOGRAM REPORT  Patient Name:      RITA PABLO MAVERICK Aguiar Physician:    [id number] Skaggs DO Study Date:        4/29/2024             Ordering Provider:    [id number] LIZAMA MRN/PID:           [id number]              Fellow: Accession#:        [id number]          Nurse: Date of Birth/Age: 1975 / 48 years Sonographer:          Dionte Vasquez RDCS Gender:            F                     Additional Staff: Height:            170.00 cm             Admit Date: Weight:            69.00 kg              Admission Status:     Inpatient -                                                                Routine BSA / BMI:         1.80 m2 / 23.88 kg/m2 Department Location:   [facility] ICU Blood Pressure: 144 /36 mmHg Study Type:    TRANSTHORACIC ECHO (TTE) COMPLETE Diagnosis/ICD: Endocarditis, valve unspecified-I38 Indication:    Endocarditis CPT Codes:     Echo Complete w Full Doppler-15309 Patient History: Pertinent History: CAD, Chest Pain, CHF, HTN and Hyperlipidemia. Pacemaker,                    ESRD, MRSA, TVr-2013/Ring, MVR-2022/#27 [facility], AVR-2022/#21                    Insp[employer]. Study Detail: The following Echo studies were performed: 2D, M-Mode, Doppler and               color flow. Technically challenging study due to poor acoustic               windows and patient lying in supine position.  PHYSICIAN INTERPRETATION: Left Ventricle: Left ventricular systolic function is normal, with an estimated ejection fraction of 65-70%. There are no regional wall motion abnormalities. The left ventricular cavity size is normal. Spectral Doppler shows an impaired relaxation pattern of left ventricular diastolic filling. Left Atrium: The left atrium is normal in size. Right Ventricle: The right ventricle is normal in size. There is normal right ventricular global systolic function. Right Atrium: The right atrium is normal in size. Aortic Valve: There is a prosthetic aortic valve present. There is bioprosthetic aortic valve. Echo findings are consistent with normal aortic valve prosthesis structure and function. There is no evidence of aortic valve regurgitation. The peak instantaneous gradient of the aortic valve is 50.7 mmHg. The mean gradient of the aortic valve is 27.0 mmHg. Mitral Valve: There is a prosthetic mitral valve present. There is a normally functioning mitral valve prosthesis. There is no evidence of mitral valve regurgitation. Tricuspid Valve: The tricuspid valve is structurally normal. There is trace tricuspid regurgitation. Pulmonic Valve: The pulmonic valve is not well visualized. The pulmonic valve regurgitation was not well visualized. Pericardium: There is no  pericardial effusion noted. Aorta: The aortic root is normal.  CONCLUSIONS:  1. Left ventricular systolic function is normal with a 65-70% estimated ejection fraction.  2. Spectral Doppler shows an impaired relaxation pattern of left ventricular diastolic filling.  3. There is a normally functioning mitral valve prosthesis.  4. There is a bioprosthetic aortic valve. QUANTITATIVE DATA SUMMARY: 2D MEASUREMENTS:                          Normal Ranges: LAs:           3.30 cm   (2.7-4.0cm) IVSd:          0.99 cm   (0.6-1.1cm) LVPWd:         0.96 cm   (0.6-1.1cm) LVIDd:         3.93 cm   (3.9-5.9cm) LVIDs:         2.52 cm LV Mass Index: 66.4 g/m2 LV % FS        35.9 % LA VOLUME:                               Normal Ranges: LA Vol A4C:        53.4 ml    (22+/-6mL/m2) LA Vol A2C:        38.7 ml LA Vol BP:         47.0 ml LA Vol Index A4C:  29.7ml/m2 LA Vol Index A2C:  21.5 ml/m2 LA Vol Index BP:   26.2 ml/m2 LA Area A4C:       17.0 cm2 LA Area A2C:       14.0 cm2 LA Major Axis A4C: 4.6 cm LA Major Axis A2C: 4.3 cm LA Volume Index:   25.0 ml/m2 LA Vol A4C:        49.0 ml LA Vol A2C:        37.0 ml RA VOLUME BY A/L METHOD:                       Normal Ranges: RA Area A4C: 16.0 cm2 LV SYSTOLIC FUNCTION BY 2D PLANIMETRY (MOD):                     Normal Ranges: EF-A4C View: 59.6 % (>=55%) EF-A2C View: 65.1 % EF-Biplane:  61.8 % LV DIASTOLIC FUNCTION:                     Normal Ranges: MV Peak E: 1.56 m/s (0.7-1.2 m/s) MV Peak A: 1.58 m/s (0.42-0.7 m/s) E/A Ratio: 0.99     (1.0-2.2) MITRAL VALVE:                       Normal Ranges: MV Vmax:    1.66 m/s  (<=1.3m/s) MV peak P.0 mmHg (<5mmHg) MV mean P.0 mmHg  (<48mmHg) MV DT:      254 msec  (150-240msec) AORTIC VALVE:                                    Normal Ranges: AoV Vmax:                3.56 m/s  (<=1.7m/s) AoV Peak P.7 mmHg (<20mmHg) AoV Mean P.0 mmHg (1.7-11.5mmHg) LVOT Max Jamarcus:            1.41 m/s  (<=1.1m/s) AoV VTI:                  64.40 cm  (18-25cm) LVOT VTI:                30.60 cm AoV Dimensionless Index: 0.48  RIGHT VENTRICLE: RV Basal 3.39 cm RV Mid   2.40 cm RV Major 6.2 cm TAPSE:   17.5 mm RV s'    0.12 m/s TRICUSPID VALVE/RVSP:                             Normal Ranges: Peak TR Velocity: 2.58 m/s RV Syst Pressure: 29.6 mmHg (< 30mmHg) IVC Diam:         1.41 cm PULMONIC VALVE:                         Normal Ranges: PV Accel Time: 92 msec  (>120ms) PV Max Jamarcus:    1.2 m/s  (0.6-0.9m/s) PV Max P.9 mmHg  [id number] Flores DO Electronically signed on 2024 at 2:07:09 PM  ** Final **     Transesophageal Echo (LAURA)    Result Date: 2024           [place] [address]            Phone [phone] TRANSESOPHAGEAL ECHOCARDIOGRAM REPORT  Patient Name:     RITA TEMPLE Reading Physician:   Magdaleno Flores DO Study Date:       2024            Ordering Provider:   [id number] FLORES MRN/PID:          [id number]             Fellow: Accession#:       [id number]         Nurse: Date of           1975      Sonographer:         Dionte Vasquez RDCS Birth/Age:        years Gender:           F                    Additional Staff: Height:           165.00 cm            Admit Date: Weight:           65.00 kg             Admission Status:    Inpatient - Routine BSA:              1.72 m2              Department Location: [facility] Lab Blood Pressure: 142 /50 mmHg Study Type:    TRANSESOPHAGEAL ECHO (LAURA) Diagnosis/ICD: Viral endocarditis-B33.21 Indication:    Endocarditis CPT Codes:     LAURA Complete-84189 Patient History: Pertinent History: HTN, Hyperlipidemia, CAD and CHF. ESRD, Pacemaker, s/p                    Redo-sternotomy with TVr-ring repair, AVR #21 Inspirus, MVR                    #27 [facility] [employer] Epic Bioprosthesis. Study Detail: The following Echo studies were performed: 2D, color flow and                Doppler.  PHYSICIAN INTERPRETATION: LAURA Details: The LAURA probe used was [id number]. Technically adequate omniplane transesophageal echocardiogram performed. Color flow Doppler echo was performed to assess for the presence of a patent foramen ovale. LAURA Medication: The patient was sedated by Anesthesia; please refer to anesthesia flow sheet for medications used. LAURA Procedure: The probe was passed without difficulty. Left Ventricle: Left ventricular systolic function is normal. There are no regional wall motion abnormalities. The left ventricular cavity size is normal. Left ventricular diastolic filling was not assessed. Left Atrium: The left atrium is normal in size. There is a normal sized left atrial appendage, the left atrial appendage Doppler velocities are normal and there is no thrombus visualized in the left atrial appendage. Right Ventricle: The right ventricle is normal in size. There is normal right ventricular global systolic function. Right Atrium: The right atrium is normal in size. Aortic Valve: There is no visualized aortic valve vegetation. There is a prosthetic aortic valve present. There is bioprosthetic aortic valve. There is no evidence of aortic valve regurgitation. Mitral Valve: There is a prosthetic mitral valve present. There is a mitral valve bioprosthesis. There is trace mitral valve regurgitation. There is a mobile echodensity at the base of the anterior leaflet suspicious for vegetation, decreased in size in comparison to 9/2023 study. There is a small perforation of this leaflet associated but overall the valve integrity is fairly well preserved. Tricuspid Valve: The tricuspid valve is structurally normal. There is trace tricuspid regurgitation. Pulmonic Valve: The pulmonic valve was not assessed. The pulmonic valve regurgitation was not assessed. Pericardium: There is no pericardial effusion noted. Aorta: The aortic root is normal.  CONCLUSIONS:  1. Left ventricular systolic function  is normal.  2. There is a mobile echodensity at the base of the anterior leaflet suspicious for vegetation, decreased in size in comparison to 9/2023 study. There is a small perforation of this leaflet associated but overall the valve integrity is fairly well preserved.  3. No aortic valve vegetation visualized.  4. There is a bioprosthetic aortic valve. QUANTITATIVE DATA SUMMARY:  [id number] Fabian Skaggs DO Electronically signed on 1/12/2024 at 1:32:47 PM  ** Final **     Transthoracic Echo (TTE) Complete    Result Date: 1/11/2024           [place] [address]            Phone [phone] TRANSTHORACIC ECHOCARDIOGRAM REPORT  Patient Name:     RITA SAMY TEMPLE Reading Physician:   [id number] Fabian Skaggs DO Study Date:       1/10/2024            Ordering Provider:   [id number] MASSIEL NARANJO MRN/PID:          [id number]             Fellow: Accession#:       [id number]         Nurse: Date of           1975 / 48      Sonographer:         Lety Ivy RDCS Birth/Age:        years Gender:           F                    Additional Staff: Height:           165.10 cm            Admit Date: Weight:           63.96 kg             Admission Status:    Inpatient - Routine BSA:              1.71 m2              Department Location: [facility] Blood Pressure: 110 /36 mmHg Study Type:    TRANSTHORACIC ECHO (TTE) COMPLETE Diagnosis/ICD: Presence of prosthetic heart valve-Z95.2 Indication:    bacteremia CPT Codes:     Echo Complete w Full Doppler-13517 Patient History: Pacer/Defib:       Permanent pacemaker Pertinent History: HTN. s/p MVR, s/p AVR,bacteremia, PAF,ESRD, Anemia,                    CABG,endocarditis. Study Detail: The following Echo studies were performed: 2D, M-Mode, color flow               and Doppler. Technically challenging study due to dialysis cath lt               parasternal.  PHYSICIAN INTERPRETATION:  Left Ventricle: Left ventricular systolic function is normal, with an estimated ejection fraction of 60-65%. There are no regional wall motion abnormalities. The left ventricular cavity size is normal. Spectral Doppler shows a normal pattern of left ventricular diastolic filling. Left Atrium: The left atrium is normal in size. Right Ventricle: The right ventricle is normal in size. There is normal right ventricular global systolic function. Right Atrium: The right atrium is normal in size. Aortic Valve: There is no visualized aortic valve vegetation. There is a prosthetic aortic valve present. There is bioprosthetic aortic valve. There is no evidence of aortic valve regurgitation. The peak instantaneous gradient of the aortic valve is 33.2 mmHg. The mean gradient of the aortic valve is 19.0 mmHg. Mitral Valve: There is a prosthetic mitral valve present. There is a normally functioning mitral valve prosthesis. There was no mitral valve vegetation visualized. There is trace mitral valve regurgitation. Tricuspid Valve: The tricuspid valve is structurally normal. There is trace tricuspid regurgitation. Pulmonic Valve: The pulmonic valve is not well visualized. The pulmonic valve regurgitation was not well visualized. Pericardium: There is no pericardial effusion noted. Aorta: The aortic root is normal.  CONCLUSIONS:  1. Left ventricular systolic function is normal with a 60-65% estimated ejection fraction.  2. No mitral valve vegetation visualized.  3. There is a normally functioning mitral valve prosthesis.  4. No aortic valve vegetation visualized.  5. There is a bioprosthetic aortic valve. QUANTITATIVE DATA SUMMARY: 2D MEASUREMENTS:                          Normal Ranges: IVSd:          0.85 cm   (0.6-1.1cm) LVPWd:         0.72 cm   (0.6-1.1cm) LVIDd:         3.87 cm   (3.9-5.9cm) LV Mass Index: 50.5 g/m2 LV SYSTOLIC FUNCTION BY 2D PLANIMETRY (MOD):                     Normal Ranges: EF-A4C View: 60.9 % (>=55%)  EF-A2C View: 65.0 % EF-Biplane:  62.9 % LV DIASTOLIC FUNCTION:                     Normal Ranges: MV Peak E: 1.87 m/s (0.7-1.2 m/s) MV Peak A: 1.01 m/s (0.42-0.7 m/s) E/A Ratio: 1.85     (1.0-2.2) MITRAL VALVE:                       Normal Ranges: MV Vmax:    1.78 m/s  (<=1.3m/s) MV peak P.7 mmHg (<5mmHg) MV mean P.0 mmHg  (<48mmHg) MV DT:      320 msec  (150-240msec) AORTIC VALVE:                                    Normal Ranges: AoV Vmax:                2.88 m/s  (<=1.7m/s) AoV Peak P.2 mmHg (<20mmHg) AoV Mean P.0 mmHg (1.7-11.5mmHg) LVOT Max Jamarcus:            1.12 m/s  (<=1.1m/s) AoV VTI:                 62.50 cm  (18-25cm) LVOT VTI:                21.00 cm LVOT Diameter:           2.00 cm   (1.8-2.4cm) AoV Area, VTI:           1.06 cm2  (2.5-5.5cm2) AoV Area,Vmax:           1.22 cm2  (2.5-4.5cm2) AoV Dimensionless Index: 0.34 TRICUSPID VALVE/RVSP:                             Normal Ranges: Peak TR Velocity: 2.84 m/s RV Syst Pressure: 35.3 mmHg (< 30mmHg) IVC Diam:         1.28 cm  [id number] Fabian Skaggs DO Electronically signed on 2024 at 11:38:48 AM  ** Final **      Current Facility-Administered Medications:     acetaminophen (Tylenol) tablet 650 mg, 650 mg, oral, q6h PRN, Alex E Hipps, PA-C    aspirin EC tablet 81 mg, 81 mg, oral, Daily, Alex E Hipps, PA-C, 81 mg at 24 0831    atorvastatin (Lipitor) tablet 40 mg, 40 mg, oral, Nightly, Alex E Hipps, PA-C, 40 mg at 24 2140    buPROPion (Wellbutrin) tablet 100 mg, 100 mg, oral, Every other day, ROSA Jacinto-C, 100 mg at 24 1004    calcitriol (Rocaltrol) capsule 0.5 mcg, 0.5 mcg, oral, Daily, ROSA Jacinto-C, 0.5 mcg at 24 0831    dextrose 50 % injection 12.5 g, 12.5 g, intravenous, q15 min PRN, ORI Zarate-CNP    dextrose 50 % injection 25 g, 25 g, intravenous, q15 min PRN, ORI Zarate-SAL    diphenhydrAMINE (BENADryl) injection 25 mg, 25 mg, intravenous, q6h PRN, Ivory  L Krihwan, APRN-CNP, 25 mg at 05/01/24 0412    epoetin brandy-epbx (RETACRIT) injection 6,500 Units, 6,500 Units, subcutaneous, Once per day on Monday Wednesday Friday, Alex Alcocer PA-C, 6,500 Units at 05/01/24 0832    ergocalciferol (Vitamin D-2) capsule 1,250 mcg, 1,250 mcg, oral, Every Sunday, Alex Alcocer PA-C    glucagon (Glucagen) injection 1 mg, 1 mg, intramuscular, q15 min PRN, ANGELA Zarate    glucagon (Glucagen) injection 1 mg, 1 mg, intramuscular, q15 min PRN, ANGELA Zarate    heparin (porcine) injection 5,000 Units, 5,000 Units, subcutaneous, q8h KIMBERLY, Alex Alcocer PA-C, 5,000 Units at 04/30/24 0535    heparin 1,000 unit/mL injection 1,000 Units, 1,000 Units, intra-catheter, After Dialysis, Zhou Pedersen MD, 2,100 Units at 04/29/24 0027    heparin 1,000 unit/mL injection 1,000 Units, 1,000 Units, intra-catheter, After Dialysis, Zhou Pedersen MD, 4,200 Units at 04/30/24 1719    HYDROmorphone (Dilaudid) injection 0.2 mg, 0.2 mg, intravenous, q4h PRN, Brittany Krueger PA-C, 0.2 mg at 05/01/24 0951    hydrOXYzine pamoate (Vistaril) capsule 25 mg, 25 mg, oral, q8h PRN, ANGELA Douglass    levETIRAcetam (Keppra) tablet 500 mg, 500 mg, oral, Nightly, ANGELA Zarate, 500 mg at 04/30/24 2140    loratadine (Claritin) tablet 10 mg, 10 mg, oral, q48h PRN, ANGELA Douglass    metoprolol tartrate (Lopressor) tablet 12.5 mg, 12.5 mg, oral, BID, ANGELA Zarate, 12.5 mg at 05/01/24 0831    oxyCODONE-acetaminophen (Percocet) 5-325 mg per tablet 1 tablet, 1 tablet, oral, q6h PRN, Brittany Krueger PA-C, 1 tablet at 05/01/24 0831    polyethylene glycol (Glycolax, Miralax) packet 17 g, 17 g, oral, Daily PRN, ANGELA Zarate    pramoxine (Sarna Sensitive) 1 % lotion, , Topical, q8h PRN, ANGELA Douglass    pregabalin (Lyrica) capsule 50 mg, 50 mg, oral, BID, Alex Alcocer PA-C, 50 mg at 05/01/24 0831    promethazine (Phenergan) tablet 25 mg, 25 mg,  oral, Daily PRN, Alex Alcocer PA-C, 25 mg at 04/29/24 1809    rifAMPin (Rifadin) capsule 300 mg, 300 mg, oral, q8h, Adama Soto MD, 300 mg at 05/01/24 0617    vancomycin (Vancocin) pharmacy to dose - pharmacy monitoring, , miscellaneous, Daily PRN, Adama Soto MD    [START ON 5/2/2024] vancomycin-diluent combo no.1 (Xellia) IVPB 750 mg, 750 mg, intravenous, Once per day on Tuesday Thursday Saturday, Adama Soto MD    Susceptibility data from last 90 days.  Collected Specimen Info Organism Clindamycin Erythromycin Oxacillin Tetracycline Trimethoprim/Sulfamethoxazole Vancomycin   04/27/24 Blood culture from Peripheral Venipuncture Methicillin Resistant Staphylococcus aureus (MRSA) S S R S S S   04/27/24 Blood culture from Peripheral Venipuncture Staphylococcus aureus             Symptom Management  Pain: Generalized yes worse when she is acutely sick  Medications recommended for pain?  No changes to the current pain regimen  Tiredness: Yes  Nausea: No  Depression: Yes  Anxiety: Yes with acute attacks   Drowsiness: Denied  Appetite: Fair  Wellbeing: poor  Dyspnea: Denied  Intervention recommended for dyspnea?  No  Other: Not applicable  Intervention recommended for constipation?  No    Provider estimate of survival: unknown entirely unclear awaiting more data  Patient Prognostic Awareness: Yes - congruent with provider estimation    Patient/proxy preference for information  Prefers full information    Goals of Care  Right now staying in the aggressive disease modifying model of care    Is the patient hospice-eligible?   No not in the present model of care  Was a discussion held re hospice services?   yes patient had questions about what it would look like if she decided to stop aggressive interventions we did explore this.  Right now staying in the aggressive treatment model of care  Was a decision made re hospice services?  No    Assessment/Plan   MRSA septicemia  -History of MRSA dialysis catheter  infection  ESRD on hemodialysis  Hyperkalemia, resolved  Chronic pain  Anemia of chronic disease  History of Bioprosthetic aortic and mitral valve replacements  Depression and anxiety  Palliative care  ACP conversations    CODE STATUS DNR CCA/DNI  Patient fully capable decision maker at this point states that her daughter Xiao is her primary healthcare POA with her significant other Chava being her alternate.   Will have care coordination obtain these documents for records.    Very medically complicated young lady with end-stage renal disease requiring hemodialysis with multiple MRSA bloodstream infections who presents again with the same constellation of symptoms blood cultures are positive being treated for MRSA septicemia she has extremely poor vasculature going this afternoon for IR permacath exchange over guidewire she is extremely high risk to lose us access with basically no chance of obtaining further skin or access for dialysis.  As per my conversation with the nurses then she is going to have a transesophageal echo trying to rule out valve involvement.     Lengthy goals of care ACP conversation with the patient today she had been having a really rough night last night was very conflicted about going forward but after speaking with myself Dr. Brittany Pedersen and Dr. Skaggs and the ICU team she wishes to continue in the present aggressive disease modifying model of care within the confines of the above specified DNR.  She does request to speak to psychiatry given her anxiety and depression which is longstanding but made worse by yet another hospitalization serious bout of septic shock from MRSA.    I have added as needed Vistaril as she is having anxiety attacks off of her smoldering anxiety.  Her typical pain regimen does seem to be working but her pain is usually worse when she is in the hospital I am leaving the IV Dilaudid as it is as this seems to be helping.  Continue with Lyrica and Percocet as  currently prescribed.  Added every 48 hour Claritin to see if we can help with her significant itching symptoms noted that she is also getting as needed Benadryl I think this is okay for now she does not seem to be having any untoward effects.  I have also added Sarna lotion topically to see if we can help these very bothersome symptoms.    I have placed a consult for psychiatric team to speak with the patient I did touch base with him personally this afternoon inform them of the consult.    Thank you very much for the consultation palliative medicine will follow again on goals of care and symptom management.  Presently no changes to the treatment plan we are waiting on obtaining more data.    Did bring up the topic of potentially withdrawing support which she wants to hold off on however she had many questions regarding hospice symptom control, where the services could be offered etc.    Would suggest we give it at least a couple of days before making any changes in terms of plan of care but it may not be unreasonable to set up an informational hospice meeting toward the end of the week.    She very emphatically tells me she is adamant that she will never undergoing valve surgery or heart surgery again, she feels that she has been through so much and that she has a little quality of life at the present, very low threshold to make a transition she tells me.  Doing that as it may she is okay with being aggressive for the time being.    I am hopeful since her white count dropped and clinically she is feeling much better from a couple days ago that we are at least treating infection to suppress it hopefully nothing serious shows up transesophageal echo can continue moving forward getting this woman better.    Total time was well over 120 minutes with 23 minutes of this directly on ACP.  Extensive conversation with the ICU team Dr. Pedersen early this morning.  I secure chatted of the ICU team as well regarding these  updates.    Thank very much for the consultation    ORI Douglass-CNP

## 2024-05-01 NOTE — ANESTHESIA POSTPROCEDURE EVALUATION
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    Anesthesia Post Evaluation    Patient location during evaluation: ICU  Patient participation: complete - patient participated  Level of consciousness: awake and alert  Pain score: 2  Pain management: adequate  Airway patency: patent  Cardiovascular status: acceptable  Respiratory status: acceptable and face mask  Hydration status: acceptable  Postoperative Nausea and Vomiting: none        No notable events documented.

## 2024-05-01 NOTE — PROGRESS NOTES
"[facility] Critical Care Medicine       Date:  5/1/2024  Patient:  Batsheva Page  YOB: 1975  MRN:  [id number]   Admit Date:  4/27/2024    Chief Complaint   Patient presents with    Fever     High fever, chills, thinks she has a blood infection. Hx of many blood infections. 102.7 F at home 1/2 hour ago. Reports taking a couple Asprin, but no Tylenol. Dialysis pt. Last dialysis was yesterday. Symptoms started around Tuesday. Pt reports that it's hard to get a good BP reading on her, dialysis center always has a hard time with it.    Flu Symptoms       Patient is a 48 y.o. female admitted on 4/27/2024  8:50 PM with the following indication(s) for ICU care Septic shock.   Hospital day 3 ICU day 3d 6h     History of Present Illness:  48 y.o. 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   Patient also complained of generalized myalgias, pain at the cath insertion site and malodor from the cath site. Patient described her outpatient dialysis center as \"filthy\" and noted improper care and access of her HD cath on multiple occasions by the staff there.     ED course:  Patient was hypotensive in the ER 76/36, was administered 250 ml of crystalloids, and a loading dose of vancomycin and zosyn. 2 sets of blood cultures were drawn.     Labs: WBC 21K with Left shift. Creatinine 7, Lactate 2.4, K- 6.6  She was sent to the MICU with a peripheral IV but no central catheters and no vasopressors.  Nephrology consulted for urgent dialysis.     Interval ICU Events:  4/28: Patient went into a narrow complex tachycardia this AM with rates up to 200's.  Norepinephrine gtt had been started for hypotension, turned off when patient " became tachycardic.  IV metoprolol given.  Patient currently in NSR with MAP>60 off vasopressors.  Mg was 1.5, replaced and redraw pending.  K 4.7 this morning, repeat pending.     4/29: Patient tolerated dialysis yesterday.  Remains off pressors with improvement in blood pressures.  Did have a couple episodes of non sustained SVT overnight. She is still having chronic pain issues, discussed medication regimen with patient and bedside RN, will adjust as needed. Spoke with ID Dr Soto this morning.  Will continue Vanco/Zosyn, monitor cultures, and await ECHO before making decision on tunneled catheter.     4/30: No acute events overnight.  HDS.  Hg 6.9 today, will transfuse 1 unit PRBC. BC 2/2 positive for MRSA. LAURA ordered. Spoke with ID who will reach out to IR regarding removing tunneled line. Tablo unable to be run through femoral line.  Spoke with nephrology who states to use permacath in left subclavian for dialysis today.    5/1: LAURA to be done today. Will obtain more IV access to remove femoral central line. Cont vanc and zosyn. ID, IR and neph following. If patient is still stable will transfer to step down.     Medical History:  Past Medical History:   Diagnosis Date    Aortic valve replaced 2022    CHF (congestive heart failure) (Multi)     Chronic pain     Coronary artery disease     Disease of thyroid gland     ESRD (end stage renal disease) (Multi)     H/O mitral valve replacement 2013    and 2022    Heart disease     History of transfusion     Hypertension     Mitral valve regurgitation     Seizures (Multi)     Stroke (Multi)      Past Surgical History:   Procedure Laterality Date    AORTIC VALVE REPLACEMENT  2020    bioprosthetic    CORONARY ARTERY BYPASS GRAFT      IR CVC TUNNELED  09/09/2022    IR CVC TUNNELED 9/9/2022 [facility] IN[facility]IENT LEGACY    IR CVC TUNNELED  12/28/2022    IR CVC TUNNELED 12/28/2022 [facility] INPATIENT LEGACY    MITRAL VALVE REPLACEMENT  2013    bioprosthetic    MITRAL VALVE REPLACEMENT   2020    bioprosthetic    PARATHYROIDECTOMY      US GUIDED PERCUTANEOUS PLACEMENT  07/14/2022    US GUIDED PERCUTANEOUS PLACEMENT [facility]     Medications Prior to Admission   Medication Sig Dispense Refill Last Dose    aspirin 81 mg EC tablet Take 1 tablet (81 mg) by mouth once daily. 30 tablet 2 4/27/2024    atorvastatin (Lipitor) 40 mg tablet Take 1 tablet (40 mg) by mouth once daily.   4/27/2024    buPROPion (Wellbutrin) 100 mg tablet Take 1 tablet (100 mg) by mouth every other day. 30 tablet 3 Past Week    calcitriol (Rocaltrol) 0.25 mcg capsule Take 2 capsules (0.5 mcg) by mouth once daily.   4/27/2024    cloNIDine (Catapres) 0.1 mg tablet Take 1 tablet (0.1 mg) by mouth every 8 hours. 90 tablet 3 4/27/2024    epoetin brandy-epbx (RETACRIT) 20,000 unit/mL solution Inject 6,440 Units under the skin 3 times a week. Do not start before January 17, 2024. 30 mL 2 Past Week    ergocalciferol (Vitamin D-2) 1.25 MG (86435 UT) capsule Take 1 capsule (1,250 mcg) by mouth 1 (one) time per week.   Past Week    hydrALAZINE (Apresoline) 50 mg tablet Take 1 tablet (50 mg) by mouth 3 times a day.   4/27/2024    levETIRAcetam (Keppra) 500 mg tablet Take 1.5 tablets (750 mg) by mouth once daily at bedtime.   4/27/2024    metoprolol succinate XL (Toprol-XL) 50 mg 24 hr tablet TAKE 1 TABLET BY MOUTH TWICE DAILY 60 tablet 0 4/27/2024    oxyCODONE-acetaminophen (Percocet) 5-325 mg tablet Take 1 tablet by mouth every 6 hours as needed for moderate pain (4 - 6). 5 tablet 0 4/27/2024    pregabalin (Lyrica) 25 mg capsule Take 2 capsules (50 mg) by mouth 2 times a day. 120 capsule 0 4/27/2024    promethazine (Phenergan) 25 mg tablet Take 1 tablet (25 mg) by mouth once daily as needed.   4/27/2024    sodium zirconium cyclosilicate (Lokelma) 10 gram packet DISSOLVE 1 PACKET INTO 45ML OF WATER TAKE DAILY BEFORE DINNER. ADMINSTER OTHER MEDICTIONS AT LEAST 2 HOURS BEFORE OR 2 HOURS AFTER LOKELMA 30 each 0 Past Month    acetaminophen  (Tylenol) 325 mg tablet Take 2 tablets (650 mg) by mouth every 6 hours as needed for mild pain (1 - 3). 30 tablet 0 Unknown     Kayexalate, Metoclopramide hcl, Prochlorperazine, Zofran [ondansetron hcl], and Ondansetron  Social History     Tobacco Use    Smoking status: Never    Smokeless tobacco: Never   Vaping Use    Vaping status: Never Used   Substance Use Topics    Alcohol use: Never    Drug use: Never     Family History   Problem Relation Name Age of Onset    No Known Problems Mother      No Known Problems Father         Hospital Medications:             Current Facility-Administered Medications:     acetaminophen (Tylenol) tablet 650 mg, 650 mg, oral, q6h PRN, Alex CONTI Hipps, PA-C    aspirin EC tablet 81 mg, 81 mg, oral, Daily, Alex E Hipps, PA-C, 81 mg at 04/30/24 1004    atorvastatin (Lipitor) tablet 40 mg, 40 mg, oral, Nightly, Alex CONTI Hipps, PA-C, 40 mg at 04/30/24 2140    buPROPion (Wellbutrin) tablet 100 mg, 100 mg, oral, Every other day, Alex E Hipps, PA-C, 100 mg at 04/30/24 1004    calcitriol (Rocaltrol) capsule 0.5 mcg, 0.5 mcg, oral, Daily, Alex E Zachs, PA-C, 0.5 mcg at 04/30/24 1004    dextrose 50 % injection 12.5 g, 12.5 g, intravenous, q15 min PRN, Ivory Puente APRN-CNP    dextrose 50 % injection 25 g, 25 g, intravenous, q15 min PRN, Ivory Puente APRN-CNP    diphenhydrAMINE (BENADryl) injection 25 mg, 25 mg, intravenous, q6h PRN, ORI Zarate-CNP, 25 mg at 05/01/24 0412    epoetin brandy-epbx (RETACRIT) injection 6,500 Units, 6,500 Units, subcutaneous, Once per day on Monday Wednesday Friday, Alex Alcocer, PA-C, 6,500 Units at 04/29/24 0900    ergocalciferol (Vitamin D-2) capsule 1,250 mcg, 1,250 mcg, oral, Every Sunday, Alex Alcocer PA-C    glucagon (Glucagen) injection 1 mg, 1 mg, intramuscular, q15 min PRN, ANGELA Zarate    glucagon (Glucagen) injection 1 mg, 1 mg, intramuscular, q15 min PRN, ANGELA Zarate    heparin (porcine) injection 5,000 Units, 5,000  Units, subcutaneous, q8h KIMBERLY, Alex Alcocer PA-C, 5,000 Units at 04/30/24 0535    heparin 1,000 unit/mL injection 1,000 Units, 1,000 Units, intra-catheter, After Dialysis, Zhou Pedersen MD, 2,100 Units at 04/29/24 0027    heparin 1,000 unit/mL injection 1,000 Units, 1,000 Units, intra-catheter, After Dialysis, Zhou Pedersen MD, 4,200 Units at 04/30/24 1719    HYDROmorphone (Dilaudid) injection 0.2 mg, 0.2 mg, intravenous, q4h PRN, ANGELA Zarate, 0.2 mg at 05/01/24 0145    levETIRAcetam (Keppra) tablet 500 mg, 500 mg, oral, Nightly, ANGELA Zarate, 500 mg at 04/30/24 2140    metoprolol tartrate (Lopressor) tablet 12.5 mg, 12.5 mg, oral, BID, ORI Zarate-SAL    oxyCODONE-acetaminophen (Percocet) 5-325 mg per tablet 1 tablet, 1 tablet, oral, q6h PRN, Alex Alcocer PA-C, 1 tablet at 04/29/24 0227    polyethylene glycol (Glycolax, Miralax) packet 17 g, 17 g, oral, Daily PRN, ANGELA Zarate    pregabalin (Lyrica) capsule 50 mg, 50 mg, oral, BID, Alex Alcocer, PA-C, 50 mg at 04/30/24 2140    promethazine (Phenergan) tablet 25 mg, 25 mg, oral, Daily PRN, Alex Alcocer, PA-C, 25 mg at 04/29/24 1809    rifAMPin (Rifadin) capsule 300 mg, 300 mg, oral, q8h, Adama Soto MD, 300 mg at 05/01/24 0617    vancomycin (Vancocin) pharmacy to dose - pharmacy monitoring, , miscellaneous, Daily PRN, Adama Soto MD    Review of Systems:  14 point review of systems was completed and negative except for those specially mention in my HPI    Physical Exam:    Heart Rate:  []   Temp:  [36.5 °C (97.7 °F)-37.3 °C (99.2 °F)]   Resp:  [14-33]   BP: ()/()   Weight:  [69.5 kg (153 lb 3.5 oz)-69.9 kg (154 lb 1.6 oz)]   SpO2:  [71 %-100 %]     Physical Exam  Constitutional:       General: She is not in acute distress.  HENT:      Head: Normocephalic and atraumatic.   Eyes:      Extraocular Movements: Extraocular movements intact.      Conjunctiva/sclera: Conjunctivae normal.    Cardiovascular:      Rate and Rhythm: Normal rate and regular rhythm.      Pulses:           Radial pulses are 2+ on the right side and 2+ on the left side.        Dorsalis pedis pulses are 1+ on the right side and 1+ on the left side.   Pulmonary:      Breath sounds: Normal breath sounds and air entry.   Abdominal:      General: Bowel sounds are normal.      Palpations: Abdomen is soft.   Musculoskeletal:      Cervical back: Normal range of motion and neck supple.      Right lower leg: No edema.      Left lower leg: No edema.      Comments: 5/5 strength to all extremities   Skin:     General: Skin is warm and dry.      Capillary Refill: Capillary refill takes less than 2 seconds.   Neurological:      Mental Status: She is alert and oriented to person, place, and time.      GCS: GCS eye subscore is 4. GCS verbal subscore is 5. GCS motor subscore is 6.      Cranial Nerves: Cranial nerves 2-12 are intact.   Psychiatric:         Behavior: Behavior is cooperative.       Objective:    I have reviewed all medications, laboratory results, and imaging pertinent for today's encounter.           Intake/Output Summary (Last 24 hours) at 5/1/2024 0728  Last data filed at 4/30/2024 1801  Gross per 24 hour   Intake 906.67 ml   Output 1500 ml   Net -593.33 ml       Results for orders placed or performed during the hospital encounter of 04/27/24 (from the past 24 hour(s))   POCT GLUCOSE   Result Value Ref Range    POCT Glucose 104 (H) 74 - 99 mg/dL   Prepare RBC: 1 Units   Result Value Ref Range    PRODUCT CODE N1386Q02     Unit Number [id number]     Unit ABO A     Unit RH NEG     XM INTEP COMP     Dispense Status TR     Blood Expiration Date May 02, 2024 23:59 EDT     PRODUCT BLOOD TYPE 0600     UNIT VOLUME 350    Blood Culture    Specimen: Dialysis; Blood culture   Result Value Ref Range    Blood Culture Loaded on Instrument - Culture in progress    Blood Culture    Specimen: Central Line/Catheter (Specify below); Blood  culture   Result Value Ref Range    Blood Culture Loaded on Instrument - Culture in progress    CBC and Auto Differential   Result Value Ref Range    WBC 4.8 4.4 - 11.3 x10*3/uL    nRBC 0.0 0.0 - 0.0 /100 WBCs    RBC 3.24 (L) 4.00 - 5.20 x10*6/uL    Hemoglobin 8.2 (L) 12.0 - 16.0 g/dL    Hematocrit 27.2 (L) 36.0 - 46.0 %    MCV 84 80 - 100 fL    MCH 25.3 (L) 26.0 - 34.0 pg    MCHC 30.1 (L) 32.0 - 36.0 g/dL    RDW 19.7 (H) 11.5 - 14.5 %    Platelets 151 150 - 450 x10*3/uL    Neutrophils % 65.6 40.0 - 80.0 %    Immature Granulocytes %, Automated 0.6 0.0 - 0.9 %    Lymphocytes % 15.4 13.0 - 44.0 %    Monocytes % 14.0 2.0 - 10.0 %    Eosinophils % 4.0 0.0 - 6.0 %    Basophils % 0.4 0.0 - 2.0 %    Neutrophils Absolute 3.14 1.20 - 7.70 x10*3/uL    Immature Granulocytes Absolute, Automated 0.03 0.00 - 0.70 x10*3/uL    Lymphocytes Absolute 0.74 (L) 1.20 - 4.80 x10*3/uL    Monocytes Absolute 0.67 0.10 - 1.00 x10*3/uL    Eosinophils Absolute 0.19 0.00 - 0.70 x10*3/uL    Basophils Absolute 0.02 0.00 - 0.10 x10*3/uL   Basic Metabolic Panel   Result Value Ref Range    Glucose 113 (H) 65 - 99 mg/dL    Sodium 134 133 - 145 mmol/L    Potassium 3.6 3.4 - 5.1 mmol/L    Chloride 96 (L) 97 - 107 mmol/L    Bicarbonate 24 24 - 31 mmol/L    Urea Nitrogen 11 8 - 25 mg/dL    Creatinine 3.00 (H) 0.40 - 1.60 mg/dL    eGFR 19 (L) >60 mL/min/1.73m*2    Calcium 8.5 8.5 - 10.4 mg/dL    Anion Gap 14 <=19 mmol/L   Magnesium   Result Value Ref Range    Magnesium 2.00 1.60 - 3.10 mg/dL   Phosphorus   Result Value Ref Range    Phosphorus 2.2 (L) 2.5 - 4.5 mg/dL   Troponin T   Result Value Ref Range    Troponin T, High Sensitivity 42 (HH) <=14 ng/L   Troponin T   Result Value Ref Range    Troponin T, High Sensitivity 41 (HH) <=14 ng/L       Assessment/Plan:    I am currently managing this critically ill patient for the following problems:    Neuro/Psych/Pain Ctrl/Sedation:  #Chronic pain  #Hx seizures  -Neuro checks per protocol  -Patient is A&Ox's  3  -Continue home lyrica and PRN percocet  -PRN dilaudid   -Continue home keppra  -Monitor CAM-ICU    Respiratory/ENT:  -Currently on RA  -Continuous pulse oximetry, maintain SPO2>92%  -Encourage IS  -OOB to chair    Cardiovascular:  #Septic shock resolving  #Narrow complex tachycardia, likely SVT  #Hx endocarditis with aortic (2020) and mitral (2013/2020) valve replacements  #Hx HTN  -ECHO 4/29:  1. Left ventricular systolic function is normal with a 65-70% estimated ejection fraction.   2. Spectral Doppler shows an impaired relaxation pattern of left ventricular diastolic filling.   3. There is a normally functioning mitral valve prosthesis.   4. There is a bioprosthetic aortic valve.  -Continuous cardiac monitoring  -Maintain goal MAP>60  -Currently off pressors  -Currently in sinus rhythm  -LAURA today  -Anbx as below  -Monitor and optimize electrolytes, keep K>4, Mg>2  -Start metoprolol tartrate at 12.5mg BID (takes 50XL BID at home)  -Home hydralazine and clonidine on hold  -Continue home Asa and statin    GI:  -Diet: Regular   -PRN phenergan for nausea  -GI prophylaxis: none  -Bowel regimen: Miralax PRN    Renal/Volume Status (Intra & Extravascular):  #ESRD on HD  #Hyperkalemia K 6.6 on admission  #AG metabolic acidosis 2/2 lactic acidosis resovled  -Nephrology consulted for RRT, okay to use tunneled line for dialysis per nephrology  -Renal function: 11/3.0  -K 3.6 today  -Daily RFP, Mg, Phos  -Replace electrolytes PRN    Endocrine  #Hypoglycemia improved  -Monitor blood glucose daily with labs and PRN  -Goal BS<180  -Hypoglycemic protocol  -TSH/T4 WNL    Infectious Disease:  #Leukocytosis  #Hx MRSA dialysis catheter infection/endocarditis  -Afebrile  -WBC 21.5>14.1>7  -Continue IV vanco/zosyn (4/28-**)  -Repeat BC pending  --- MRSA 4/27  -ID consulted, appreciate recs  -Per ID we will continue anbx as above, LUARA ordered, and ID reaching out to IR to make plan for tunneled line removal    Heme/Onc:  #Chronic  anemia  -Baseline Hg~8.0  -HH: 8.2  -Daily CBC  -Continue home epoetin  -PT/INR 14/1.4 on admission  -Monitor for s/s of bleeding  -Transfuse of Hg <7  -VTE prophylaxis: Heparin SQ    Derm/MSK:  -PT/OT when stable  -ICU skin protocol    Ethics/Code Status:  -DNR/DNI    :  DVT Prophylaxis: Heparin SQ  GI Prophylaxis: none  Bowel Regimen: Miralax PRN  Diet: regular  CVC: Rt groin trialysis (removing 5/1) , left subclavian tunneled line  [place]: no  [place]: no  Restraints: no  Dispo: DC to step down    Plan discussed with: Dr Tolentino  Critical Care Time:  45 minutes  Brittany Krueger PA-C  Pulmonary and Critical Care Medicine

## 2024-05-01 NOTE — CARE PLAN
Problem: Skin  Goal: Decreased wound size/increased tissue granulation at next dressing change  Outcome: Progressing  Goal: Participates in plan/prevention/treatment measures  Outcome: Progressing  Goal: Prevent/manage excess moisture  Outcome: Progressing  Goal: Prevent/minimize sheer/friction injuries  Outcome: Progressing  Goal: Promote/optimize nutrition  Outcome: Progressing  Goal: Promote skin healing  Outcome: Progressing     Problem: Pain  Goal: Takes deep breaths with improved pain control throughout the shift  Outcome: Progressing  Goal: Turns in bed with improved pain control throughout the shift  Outcome: Progressing  Goal: Walks with improved pain control throughout the shift  Outcome: Progressing  Goal: Performs ADL's with improved pain control throughout shift  Outcome: Progressing  Goal: Participates in PT with improved pain control throughout the shift  Outcome: Progressing  Goal: Free from opioid side effects throughout the shift  Outcome: Progressing  Goal: Free from acute confusion related to pain meds throughout the shift  Outcome: Progressing     Problem: Pain - Adult  Goal: Verbalizes/displays adequate comfort level or baseline comfort level  Outcome: Progressing     Problem: Safety - Adult  Goal: Free from fall injury  Outcome: Progressing     Problem: Discharge Planning  Goal: Discharge to home or other facility with appropriate resources  Outcome: Progressing     Problem: Chronic Conditions and Co-morbidities  Goal: Patient's chronic conditions and co-morbidity symptoms are monitored and maintained or improved  Outcome: Progressing   The patient's goals for the shift include      The clinical goals for the shift include Pt will report decrease in pain and use alternatives interventions to pain medicine.

## 2024-05-01 NOTE — PROGRESS NOTES
INFECTIOUS DISEASES PROGRESS NOTE    Consulted / following patient for:  MRSA sepsis, presumed dialysis catheter infection    Subjective   Interval History:   Patient stated on 4/30 that she was ready to discontinue all hemodialysis.  I had a lengthy conversation with her today.  She is despondent with her multiple episodes of catheter infection and hospital admission, but today acknowledges that she is not prepared to die and again indicates that she is willing to consider peritoneal dialysis.      I reviewed the available medical record from [facility].  She was on hemodialysis and was having a lot of difficulty with migraine headaches and gastroparesis and the decision was made to try peritoneal dialysis in December 2006.  She had multiple admissions in early 2007 with abdominal pain and one episode of possible peritonitis.  She then had a parathyroidectomy and subsequently developed severe hypocalcemia and associated metabolic problems, and therefore resumed hemodialysis late in 2007.    She has consented to a LAURA to be done this afternoon    Objective   PHYSICAL EXAMINATION  Vital signs:  Visit Vitals  BP (!) 126/47 (BP Location: Right leg, Patient Position: Lying)   Pulse 86   Temp 37.1 °C (98.7 °F) (Oral)   Resp 12   No vasopressor agents in use  General: Not toxic  HEENT:  No scleral icterus or conjunctival suffusion, oral mucosa moist  Nodes:  Negative  Chest: Tunneled dialysis catheter in the left subclavian position without erythema, suppuration, or tenderness.  Lungs clear to auscultation  Heart:  S1, S2 crisp.  Prominent systolic ejection murmur both at the apex and the base.  No diastolic murmur appreciated  Abdomen:  Soft, nontender. No palpable organs or masses  Extremities:  No cords, phlebitis, cellulitis.  Triple-lumen dialysis catheter in the right groin  Neurologic:  Alert.  Grossly non-focal.   Skin: No mucocutaneous signs of infectious endocarditis    Relevant Results  WBC 21,500 ---> ---> 4800           Microbiology:  Blood (4/27): MRSA X2  Blood (4/30): Pending X2  Imaging:  CXR images personally reviewed: Vague density at the left heart border, doubt pneumonia  TTE: No mention of valvular vegetations  LAURA: To be done today        ASSESSMENT:  MRSA septicemia/history of MRSA dialysis catheter infection  As expected, blood cultures are once again growing MRSA.  LAURA has been ordered although there are no physical stigmata of infectious endocarditis.  Patient says she will consider peritoneal dialysis, but remains very reluctant because of remote adverse experience.  Achievement of vascular access for dialysis has been extremely challenging, and Dr. Lott will be available at noon today and I will review the options with him.  Suspect our best option will be to change this tunneled dialysis catheter over a wire, and work toward peritoneal dialysis.  At this point she is hemodynamically stable, afebrile, with resolved leukocytosis, and is tolerating rifampin and vancomycin      PLANS:  -   Continue vancomycin (pharmacy dosing) and rifampin  -   Await LAURA  -   Await further incubation of blood cultures done during dialysis 4/30, 1 from the right groin and one from the chest catheter  -    Will review with Dr. Lott this afternoon.  Remove/replace tunneled catheter?    Discussed in detail with Dr. Zhou Soto MD  [facility]  Office:  [phone]

## 2024-05-01 NOTE — CARE PLAN
The patient's goals for the shift include      The clinical goals for the shift include Patient will have adequate pain control    Over the shift, the patient did not make progress toward the following goals. Barriers to progression include persistent chronic pain and refusal to try additional interventions.  Recommendations to address these barriers include education, reinforcement by medical team   Problem: Pain - Adult  Goal: Verbalizes/displays adequate comfort level or baseline comfort level  Outcome: Progressing  Flowsheets (Taken 4/30/2024 1426)  Verbalizes/displays adequate comfort level or baseline comfort level:   Encourage patient to monitor pain and request assistance   Assess pain using appropriate pain scale   Administer analgesics based on type and severity of pain and evaluate response   Implement non-pharmacological measures as appropriate and evaluate response   Consider cultural and social influences on pain and pain management   Notify Licensed Independent Practitioner if interventions unsuccessful or patient reports new pain     Problem: Safety - Adult  Goal: Free from fall injury  Outcome: Progressing  Flowsheets (Taken 4/30/2024 1426)  Free from fall injury:   Instruct family/caregiver on patient safety   Based on caregiver fall risk screen, instruct family/caregiver to ask for assistance with transferring infant if caregiver noted to have fall risk factors     Problem: Discharge Planning  Goal: Discharge to home or other facility with appropriate resources  Outcome: Progressing  Flowsheets (Taken 4/30/2024 1426)  Discharge to home or other facility with appropriate resources:   Identify barriers to discharge with patient and caregiver   Arrange for needed discharge resources and transportation as appropriate   Identify discharge learning needs (meds, wound care, etc)   Refer to discharge planning if patient needs post-hospital services based on physician order or complex needs related to  functional status, cognitive ability or social support system   Arrange for interpreters to assist at discharge as needed     Problem: Chronic Conditions and Co-morbidities  Goal: Patient's chronic conditions and co-morbidity symptoms are monitored and maintained or improved  Outcome: Progressing  Flowsheets (Taken 4/30/2024 1426)  Care Plan - Patient's Chronic Conditions and Co-Morbidity Symptoms are Monitored and Maintained or Improved:   Monitor and assess patient's chronic conditions and comorbid symptoms for stability, deterioration, or improvement   Collaborate with multidisciplinary team to address chronic and comorbid conditions and prevent exacerbation or deterioration   Update acute care plan with appropriate goals if chronic or comorbid symptoms are exacerbated and prevent overall improvement and discharge     Problem: Skin  Goal: Decreased wound size/increased tissue granulation at next dressing change  Outcome: Progressing  Flowsheets (Taken 5/1/2024 1724)  Decreased wound size/increased tissue granulation at next dressing change:   Promote sleep for wound healing   Utilize specialty bed per algorithm   Protective dressings over bony prominences  Goal: Participates in plan/prevention/treatment measures  Outcome: Progressing  Flowsheets (Taken 5/1/2024 1724)  Participates in plan/prevention/treatment measures:   Elevate heels   Increase activity/out of bed for meals  Goal: Prevent/manage excess moisture  Outcome: Progressing  Flowsheets (Taken 5/1/2024 1724)  Prevent/manage excess moisture:   Cleanse incontinence/protect with barrier cream   Moisturize dry skin   Follow provider orders for dressing changes   Monitor for/manage infection if present  Goal: Prevent/minimize sheer/friction injuries  Outcome: Progressing  Flowsheets (Taken 5/1/2024 1724)  Prevent/minimize sheer/friction injuries:   Complete micro-shifts as needed if patient unable. Adjust patient position to relieve pressure points, not a  full turn   Increase activity/out of bed for meals   Use pull sheet   HOB 30 degrees or less   Turn/reposition every 2 hours/use positioning/transfer devices   Utilize specialty bed per algorithm  Goal: Promote/optimize nutrition  Outcome: Progressing  Flowsheets (Taken 5/1/2024 1724)  Promote/optimize nutrition:   Assist with feeding   Monitor/record intake including meals   Consume > 50% meals/supplements   Offer water/supplements/favorite foods   Reassess MST if dietician not consulted  Goal: Promote skin healing  Outcome: Progressing  Flowsheets (Taken 5/1/2024 1724)  Promote skin healing:   Assess skin/pad under line(s)/device(s)   Protective dressings over bony prominences   Turn/reposition every 2 hours/use positioning/transfer devices   Ensure correct size (line/device) and apply per  instructions   Rotate device position/do not position patient on device     Problem: Pain  Goal: Takes deep breaths with improved pain control throughout the shift  Outcome: Progressing  Goal: Turns in bed with improved pain control throughout the shift  Outcome: Progressing  Goal: Walks with improved pain control throughout the shift  Outcome: Progressing  Goal: Performs ADL's with improved pain control throughout shift  Outcome: Progressing  Goal: Participates in PT with improved pain control throughout the shift  Outcome: Progressing  Goal: Free from opioid side effects throughout the shift  Outcome: Progressing  Goal: Free from acute confusion related to pain meds throughout the shift  Outcome: Progressing     Problem: Fall/Injury  Goal: Not fall by end of shift  Outcome: Progressing  Goal: Be free from injury by end of the shift  Outcome: Progressing  Goal: Verbalize understanding of personal risk factors for fall in the hospital  Outcome: Progressing  Goal: Verbalize understanding of risk factor reduction measures to prevent injury from fall in the home  Outcome: Progressing  Goal: Pace activities to prevent  fatigue by end of the shift  Outcome: Progressing

## 2024-05-02 ENCOUNTER — APPOINTMENT (OUTPATIENT)
Dept: CARDIOLOGY | Facility: HOSPITAL | Age: 49
DRG: 314 | End: 2024-05-02
Payer: MEDICARE

## 2024-05-02 ENCOUNTER — APPOINTMENT (OUTPATIENT)
Dept: DIALYSIS | Facility: HOSPITAL | Age: 49
End: 2024-05-02
Payer: MEDICARE

## 2024-05-02 LAB
ANION GAP SERPL CALC-SCNC: 18 MMOL/L
BACTERIA BLD AEROBE CULT: ABNORMAL
BACTERIA BLD AEROBE CULT: ABNORMAL
BACTERIA BLD CULT: ABNORMAL
BACTERIA BLD CULT: ABNORMAL
BASOPHILS # BLD AUTO: 0.02 X10*3/UL (ref 0–0.1)
BASOPHILS NFR BLD AUTO: 0.3 %
BUN SERPL-MCNC: 17 MG/DL (ref 8–25)
CALCIUM SERPL-MCNC: 8.7 MG/DL (ref 8.5–10.4)
CHLORIDE SERPL-SCNC: 96 MMOL/L (ref 97–107)
CO2 SERPL-SCNC: 22 MMOL/L (ref 24–31)
CREAT SERPL-MCNC: 5.3 MG/DL (ref 0.4–1.6)
EGFRCR SERPLBLD CKD-EPI 2021: 9 ML/MIN/1.73M*2
EOSINOPHIL # BLD AUTO: 0.29 X10*3/UL (ref 0–0.7)
EOSINOPHIL NFR BLD AUTO: 4.9 %
ERYTHROCYTE [DISTWIDTH] IN BLOOD BY AUTOMATED COUNT: 19.9 % (ref 11.5–14.5)
GLUCOSE SERPL-MCNC: 121 MG/DL (ref 65–99)
GRAM STN SPEC: ABNORMAL
HCT VFR BLD AUTO: 31.8 % (ref 36–46)
HGB BLD-MCNC: 9.2 G/DL (ref 12–16)
IMM GRANULOCYTES # BLD AUTO: 0.08 X10*3/UL (ref 0–0.7)
IMM GRANULOCYTES NFR BLD AUTO: 1.3 % (ref 0–0.9)
LYMPHOCYTES # BLD AUTO: 0.63 X10*3/UL (ref 1.2–4.8)
LYMPHOCYTES NFR BLD AUTO: 10.6 %
MAGNESIUM SERPL-MCNC: 2.5 MG/DL (ref 1.6–3.1)
MCH RBC QN AUTO: 25.1 PG (ref 26–34)
MCHC RBC AUTO-ENTMCNC: 28.9 G/DL (ref 32–36)
MCV RBC AUTO: 87 FL (ref 80–100)
MONOCYTES # BLD AUTO: 0.59 X10*3/UL (ref 0.1–1)
MONOCYTES NFR BLD AUTO: 9.9 %
NEUTROPHILS # BLD AUTO: 4.35 X10*3/UL (ref 1.2–7.7)
NEUTROPHILS NFR BLD AUTO: 73 %
NRBC BLD-RTO: 0 /100 WBCS (ref 0–0)
PHOSPHATE SERPL-MCNC: 2.8 MG/DL (ref 2.5–4.5)
PLATELET # BLD AUTO: 179 X10*3/UL (ref 150–450)
POTASSIUM SERPL-SCNC: 3.4 MMOL/L (ref 3.4–5.1)
RBC # BLD AUTO: 3.67 X10*6/UL (ref 4–5.2)
SODIUM SERPL-SCNC: 136 MMOL/L (ref 133–145)
VANCOMYCIN SERPL-MCNC: 25.3 UG/ML (ref 10–20)
WBC # BLD AUTO: 6 X10*3/UL (ref 4.4–11.3)

## 2024-05-02 PROCEDURE — 80048 BASIC METABOLIC PNL TOTAL CA: CPT

## 2024-05-02 PROCEDURE — 84100 ASSAY OF PHOSPHORUS: CPT

## 2024-05-02 PROCEDURE — 2500000004 HC RX 250 GENERAL PHARMACY W/ HCPCS (ALT 636 FOR OP/ED): Performed by: STUDENT IN AN ORGANIZED HEALTH CARE EDUCATION/TRAINING PROGRAM

## 2024-05-02 PROCEDURE — 2500000001 HC RX 250 WO HCPCS SELF ADMINISTERED DRUGS (ALT 637 FOR MEDICARE OP)

## 2024-05-02 PROCEDURE — 8010000001 HC DIALYSIS - HEMODIALYSIS PER DAY

## 2024-05-02 PROCEDURE — 2500000004 HC RX 250 GENERAL PHARMACY W/ HCPCS (ALT 636 FOR OP/ED): Performed by: RADIOLOGY

## 2024-05-02 PROCEDURE — 85025 COMPLETE CBC W/AUTO DIFF WBC: CPT

## 2024-05-02 PROCEDURE — 36415 COLL VENOUS BLD VENIPUNCTURE: CPT | Performed by: HOSPITALIST

## 2024-05-02 PROCEDURE — 2720000007 HC OR 272 NO HCPCS

## 2024-05-02 PROCEDURE — 2500000004 HC RX 250 GENERAL PHARMACY W/ HCPCS (ALT 636 FOR OP/ED)

## 2024-05-02 PROCEDURE — C1769 GUIDE WIRE: HCPCS

## 2024-05-02 PROCEDURE — 83735 ASSAY OF MAGNESIUM: CPT

## 2024-05-02 PROCEDURE — 2060000001 HC INTERMEDIATE ICU ROOM DAILY

## 2024-05-02 PROCEDURE — 2500000005 HC RX 250 GENERAL PHARMACY W/O HCPCS: Performed by: RADIOLOGY

## 2024-05-02 PROCEDURE — 99223 1ST HOSP IP/OBS HIGH 75: CPT

## 2024-05-02 PROCEDURE — 2500000004 HC RX 250 GENERAL PHARMACY W/ HCPCS (ALT 636 FOR OP/ED): Performed by: HOSPITALIST

## 2024-05-02 PROCEDURE — 2500000005 HC RX 250 GENERAL PHARMACY W/O HCPCS: Performed by: INTERNAL MEDICINE

## 2024-05-02 PROCEDURE — C1887 CATHETER, GUIDING: HCPCS

## 2024-05-02 PROCEDURE — 99152 MOD SED SAME PHYS/QHP 5/>YRS: CPT

## 2024-05-02 PROCEDURE — G0316 PR PROLONGED INPATIENT/OBSERVATION EM SVC EA 15 MIN: HCPCS

## 2024-05-02 PROCEDURE — 80202 ASSAY OF VANCOMYCIN: CPT | Performed by: INTERNAL MEDICINE

## 2024-05-02 PROCEDURE — 2780000003 HC OR 278 NO HCPCS

## 2024-05-02 PROCEDURE — 36589 REMOVAL TUNNELED CV CATH: CPT | Performed by: RADIOLOGY

## 2024-05-02 PROCEDURE — 2500000001 HC RX 250 WO HCPCS SELF ADMINISTERED DRUGS (ALT 637 FOR MEDICARE OP): Performed by: INTERNAL MEDICINE

## 2024-05-02 PROCEDURE — 99232 SBSQ HOSP IP/OBS MODERATE 35: CPT | Performed by: NURSE PRACTITIONER

## 2024-05-02 PROCEDURE — 2500000004 HC RX 250 GENERAL PHARMACY W/ HCPCS (ALT 636 FOR OP/ED): Mod: JZ

## 2024-05-02 PROCEDURE — 87186 SC STD MICRODIL/AGAR DIL: CPT

## 2024-05-02 PROCEDURE — 87070 CULTURE OTHR SPECIMN AEROBIC: CPT

## 2024-05-02 PROCEDURE — 36581 REPLACE TUNNELED CV CATH: CPT | Performed by: RADIOLOGY

## 2024-05-02 PROCEDURE — 2500000002 HC RX 250 W HCPCS SELF ADMINISTERED DRUGS (ALT 637 FOR MEDICARE OP, ALT 636 FOR OP/ED)

## 2024-05-02 PROCEDURE — 99153 MOD SED SAME PHYS/QHP EA: CPT

## 2024-05-02 PROCEDURE — 36415 COLL VENOUS BLD VENIPUNCTURE: CPT

## 2024-05-02 PROCEDURE — 2500000001 HC RX 250 WO HCPCS SELF ADMINISTERED DRUGS (ALT 637 FOR MEDICARE OP): Performed by: HOSPITALIST

## 2024-05-02 RX ORDER — CYANOCOBALAMIN (VITAMIN B-12) 500 MCG
1 TABLET ORAL NIGHTLY
Status: DISCONTINUED | OUTPATIENT
Start: 2024-05-02 | End: 2024-05-08 | Stop reason: HOSPADM

## 2024-05-02 RX ORDER — FENTANYL CITRATE 50 UG/ML
INJECTION, SOLUTION INTRAMUSCULAR; INTRAVENOUS AS NEEDED
Status: DISCONTINUED | OUTPATIENT
Start: 2024-05-02 | End: 2024-05-08 | Stop reason: HOSPADM

## 2024-05-02 RX ORDER — METOPROLOL TARTRATE 1 MG/ML
10 INJECTION, SOLUTION INTRAVENOUS ONCE
Status: COMPLETED | OUTPATIENT
Start: 2024-05-02 | End: 2024-05-02

## 2024-05-02 RX ORDER — POTASSIUM CHLORIDE 14.9 MG/ML
20 INJECTION INTRAVENOUS ONCE
Status: COMPLETED | OUTPATIENT
Start: 2024-05-02 | End: 2024-05-02

## 2024-05-02 RX ORDER — DIPHENHYDRAMINE HYDROCHLORIDE 50 MG/ML
INJECTION INTRAMUSCULAR; INTRAVENOUS AS NEEDED
Status: DISCONTINUED | OUTPATIENT
Start: 2024-05-02 | End: 2024-05-08 | Stop reason: HOSPADM

## 2024-05-02 RX ORDER — HEPARIN SODIUM 1000 [USP'U]/ML
1000 INJECTION, SOLUTION INTRAVENOUS; SUBCUTANEOUS
Status: DISCONTINUED | OUTPATIENT
Start: 2024-05-02 | End: 2024-05-08 | Stop reason: HOSPADM

## 2024-05-02 RX ORDER — MIRTAZAPINE 15 MG/1
15 TABLET, FILM COATED ORAL NIGHTLY
Status: DISCONTINUED | OUTPATIENT
Start: 2024-05-02 | End: 2024-05-08 | Stop reason: HOSPADM

## 2024-05-02 RX ORDER — POTASSIUM CHLORIDE 1.5 G/1.58G
40 POWDER, FOR SOLUTION ORAL ONCE
Status: DISCONTINUED | OUTPATIENT
Start: 2024-05-02 | End: 2024-05-02

## 2024-05-02 RX ORDER — METOPROLOL TARTRATE 25 MG/1
25 TABLET, FILM COATED ORAL 2 TIMES DAILY
Status: DISCONTINUED | OUTPATIENT
Start: 2024-05-02 | End: 2024-05-08 | Stop reason: HOSPADM

## 2024-05-02 RX ORDER — CALCIUM GLUCONATE 20 MG/ML
2 INJECTION, SOLUTION INTRAVENOUS ONCE
Status: DISCONTINUED | OUTPATIENT
Start: 2024-05-02 | End: 2024-05-02

## 2024-05-02 RX ORDER — MAGNESIUM SULFATE HEPTAHYDRATE 40 MG/ML
2 INJECTION, SOLUTION INTRAVENOUS ONCE
Status: DISCONTINUED | OUTPATIENT
Start: 2024-05-02 | End: 2024-05-02

## 2024-05-02 RX ORDER — MIDAZOLAM HYDROCHLORIDE 1 MG/ML
INJECTION, SOLUTION INTRAMUSCULAR; INTRAVENOUS AS NEEDED
Status: DISCONTINUED | OUTPATIENT
Start: 2024-05-02 | End: 2024-05-08 | Stop reason: HOSPADM

## 2024-05-02 RX ORDER — DIPHENHYDRAMINE HYDROCHLORIDE 50 MG/ML
25 INJECTION INTRAMUSCULAR; INTRAVENOUS ONCE
Status: COMPLETED | OUTPATIENT
Start: 2024-05-02 | End: 2024-05-02

## 2024-05-02 RX ORDER — CALCIUM GLUCONATE 98 MG/ML
2 INJECTION, SOLUTION INTRAVENOUS ONCE
Status: DISCONTINUED | OUTPATIENT
Start: 2024-05-02 | End: 2024-05-02 | Stop reason: CLARIF

## 2024-05-02 RX ADMIN — HYDRALAZINE HYDROCHLORIDE 50 MG: 50 TABLET ORAL at 21:34

## 2024-05-02 RX ADMIN — DIPHENHYDRAMINE HYDROCHLORIDE 25 MG: 50 INJECTION, SOLUTION INTRAMUSCULAR; INTRAVENOUS at 05:09

## 2024-05-02 RX ADMIN — CALCITRIOL CAPSULES 0.25 MCG 0.5 MCG: 0.25 CAPSULE ORAL at 11:53

## 2024-05-02 RX ADMIN — RIFAMPIN 300 MG: 300 CAPSULE ORAL at 13:36

## 2024-05-02 RX ADMIN — HEPARIN SODIUM 2100 UNITS: 1000 INJECTION INTRAVENOUS; SUBCUTANEOUS at 11:33

## 2024-05-02 RX ADMIN — PROMETHAZINE HYDROCHLORIDE 25 MG: 25 TABLET ORAL at 12:23

## 2024-05-02 RX ADMIN — CLONIDINE HYDROCHLORIDE 0.1 MG: 0.1 TABLET ORAL at 21:34

## 2024-05-02 RX ADMIN — MIDAZOLAM 3 MG: 1 INJECTION INTRAMUSCULAR; INTRAVENOUS at 15:35

## 2024-05-02 RX ADMIN — DIPHENHYDRAMINE HYDROCHLORIDE 25 MG: 50 INJECTION, SOLUTION INTRAMUSCULAR; INTRAVENOUS at 22:33

## 2024-05-02 RX ADMIN — HYDROMORPHONE HYDROCHLORIDE 0.2 MG: 1 INJECTION, SOLUTION INTRAMUSCULAR; INTRAVENOUS; SUBCUTANEOUS at 05:13

## 2024-05-02 RX ADMIN — HYDRALAZINE HYDROCHLORIDE 50 MG: 50 TABLET ORAL at 16:26

## 2024-05-02 RX ADMIN — RIFAMPIN 300 MG: 300 CAPSULE ORAL at 05:06

## 2024-05-02 RX ADMIN — CLONIDINE HYDROCHLORIDE 0.1 MG: 0.1 TABLET ORAL at 13:36

## 2024-05-02 RX ADMIN — HYDROMORPHONE HYDROCHLORIDE 0.2 MG: 1 INJECTION, SOLUTION INTRAMUSCULAR; INTRAVENOUS; SUBCUTANEOUS at 01:28

## 2024-05-02 RX ADMIN — DIPHENHYDRAMINE HYDROCHLORIDE 50 MG: 50 INJECTION, SOLUTION INTRAMUSCULAR; INTRAVENOUS at 15:37

## 2024-05-02 RX ADMIN — MAGNESIUM SULFATE HEPTAHYDRATE 2 G: 40 INJECTION, SOLUTION INTRAVENOUS at 03:47

## 2024-05-02 RX ADMIN — ATORVASTATIN CALCIUM 40 MG: 40 TABLET, FILM COATED ORAL at 21:34

## 2024-05-02 RX ADMIN — VANCOMYCIN 750 MG: 750 INJECTION, SOLUTION INTRAVENOUS at 18:40

## 2024-05-02 RX ADMIN — METOPROLOL TARTRATE 25 MG: 25 TABLET, FILM COATED ORAL at 21:35

## 2024-05-02 RX ADMIN — PREGABALIN 50 MG: 50 CAPSULE ORAL at 11:53

## 2024-05-02 RX ADMIN — METOPROLOL TARTRATE 12.5 MG: 25 TABLET, FILM COATED ORAL at 11:53

## 2024-05-02 RX ADMIN — Medication 3 L/MIN: at 15:05

## 2024-05-02 RX ADMIN — RIFAMPIN 300 MG: 300 CAPSULE ORAL at 21:34

## 2024-05-02 RX ADMIN — HYDRALAZINE HYDROCHLORIDE 50 MG: 50 TABLET ORAL at 11:53

## 2024-05-02 RX ADMIN — HYDROMORPHONE HYDROCHLORIDE 0.2 MG: 1 INJECTION, SOLUTION INTRAMUSCULAR; INTRAVENOUS; SUBCUTANEOUS at 13:36

## 2024-05-02 RX ADMIN — ASPIRIN 81 MG: 81 TABLET, COATED ORAL at 11:54

## 2024-05-02 RX ADMIN — BUPROPION HYDROCHLORIDE 100 MG: 100 TABLET, FILM COATED ORAL at 11:53

## 2024-05-02 RX ADMIN — FENTANYL CITRATE 50 MCG: 50 INJECTION, SOLUTION INTRAMUSCULAR; INTRAVENOUS at 15:38

## 2024-05-02 RX ADMIN — POTASSIUM CHLORIDE 20 MEQ: 14.9 INJECTION, SOLUTION INTRAVENOUS at 03:47

## 2024-05-02 RX ADMIN — HYDROMORPHONE HYDROCHLORIDE 0.2 MG: 1 INJECTION, SOLUTION INTRAMUSCULAR; INTRAVENOUS; SUBCUTANEOUS at 09:30

## 2024-05-02 RX ADMIN — DIPHENHYDRAMINE HYDROCHLORIDE 25 MG: 50 INJECTION, SOLUTION INTRAMUSCULAR; INTRAVENOUS at 08:07

## 2024-05-02 RX ADMIN — FENTANYL CITRATE 50 MCG: 50 INJECTION, SOLUTION INTRAMUSCULAR; INTRAVENOUS at 15:35

## 2024-05-02 RX ADMIN — METOPROLOL TARTRATE 10 MG: 5 INJECTION INTRAVENOUS at 02:42

## 2024-05-02 RX ADMIN — CLONIDINE HYDROCHLORIDE 0.1 MG: 0.1 TABLET ORAL at 05:06

## 2024-05-02 RX ADMIN — Medication 1 MG: at 21:35

## 2024-05-02 RX ADMIN — PREGABALIN 50 MG: 50 CAPSULE ORAL at 21:35

## 2024-05-02 RX ADMIN — DIPHENHYDRAMINE HYDROCHLORIDE 25 MG: 50 INJECTION, SOLUTION INTRAMUSCULAR; INTRAVENOUS at 08:05

## 2024-05-02 RX ADMIN — HYDROMORPHONE HYDROCHLORIDE 0.2 MG: 1 INJECTION, SOLUTION INTRAMUSCULAR; INTRAVENOUS; SUBCUTANEOUS at 22:23

## 2024-05-02 RX ADMIN — MIDAZOLAM 2 MG: 1 INJECTION INTRAMUSCULAR; INTRAVENOUS at 15:43

## 2024-05-02 RX ADMIN — HYDROMORPHONE HYDROCHLORIDE 0.2 MG: 1 INJECTION, SOLUTION INTRAMUSCULAR; INTRAVENOUS; SUBCUTANEOUS at 18:39

## 2024-05-02 RX ADMIN — LEVETIRACETAM 500 MG: 500 TABLET, FILM COATED ORAL at 21:35

## 2024-05-02 RX ADMIN — MIRTAZAPINE 15 MG: 15 TABLET, FILM COATED ORAL at 21:35

## 2024-05-02 RX ADMIN — METOPROLOL TARTRATE 10 MG: 5 INJECTION INTRAVENOUS at 06:20

## 2024-05-02 RX ADMIN — OXYCODONE AND ACETAMINOPHEN 1 TABLET: 325; 5 TABLET ORAL at 07:03

## 2024-05-02 ASSESSMENT — PAIN - FUNCTIONAL ASSESSMENT
PAIN_FUNCTIONAL_ASSESSMENT: 0-10
PAIN_FUNCTIONAL_ASSESSMENT: 0-10
PAIN_FUNCTIONAL_ASSESSMENT: CPOT (CRITICAL CARE PAIN OBSERVATION TOOL)
PAIN_FUNCTIONAL_ASSESSMENT: CPOT (CRITICAL CARE PAIN OBSERVATION TOOL)
PAIN_FUNCTIONAL_ASSESSMENT: 0-10
PAIN_FUNCTIONAL_ASSESSMENT: CPOT (CRITICAL CARE PAIN OBSERVATION TOOL)
PAIN_FUNCTIONAL_ASSESSMENT: 0-10
PAIN_FUNCTIONAL_ASSESSMENT: CPOT (CRITICAL CARE PAIN OBSERVATION TOOL)

## 2024-05-02 ASSESSMENT — ENCOUNTER SYMPTOMS
MYALGIAS: 1
SEIZURES: 1
NERVOUS/ANXIOUS: 1
HALLUCINATIONS: 0
FATIGUE: 1
CONFUSION: 0
ARTHRALGIAS: 1
HYPERACTIVE: 0
AGITATION: 0
WEAKNESS: 1
ACTIVITY CHANGE: 1
DECREASED CONCENTRATION: 0
SLEEP DISTURBANCE: 1
NAUSEA: 1
DYSPHORIC MOOD: 1

## 2024-05-02 ASSESSMENT — PAIN SCALES - GENERAL
PAINLEVEL_OUTOF10: 0 - NO PAIN
PAINLEVEL_OUTOF10: 10 - WORST POSSIBLE PAIN
PAINLEVEL_OUTOF10: 7
PAINLEVEL_OUTOF10: 7
PAINLEVEL_OUTOF10: 10 - WORST POSSIBLE PAIN
PAINLEVEL_OUTOF10: 10 - WORST POSSIBLE PAIN
PAINLEVEL_OUTOF10: 7
PAINLEVEL_OUTOF10: 0 - NO PAIN
PAINLEVEL_OUTOF10: 7
PAINLEVEL_OUTOF10: 0 - NO PAIN
PAINLEVEL_OUTOF10: 10 - WORST POSSIBLE PAIN

## 2024-05-02 ASSESSMENT — PAIN DESCRIPTION - LOCATION
LOCATION: GENERALIZED

## 2024-05-02 ASSESSMENT — PAIN DESCRIPTION - ORIENTATION
ORIENTATION: RIGHT;LEFT

## 2024-05-02 ASSESSMENT — PAIN SCALES - WONG BAKER
WONGBAKER_NUMERICALRESPONSE: NO HURT
WONGBAKER_NUMERICALRESPONSE: NO HURT

## 2024-05-02 ASSESSMENT — PAIN DESCRIPTION - DESCRIPTORS
DESCRIPTORS: ACHING

## 2024-05-02 NOTE — PROGRESS NOTES
Batsheva Temple is a 48 y.o. female on day 4 of admission presenting with Sepsis, due to unspecified organism, unspecified whether acute organ dysfunction present (Multi).      Subjective   Patient with palpitations this morning. Had several episodes of rapid HR that were short lived. Read as a mix of VT and SVT on the monitor. No change in BP's. Got metoprolol for this, 10 mg IV once. Also received some IV K for this by ICU physician. IV mag ordered but stopped.     Patient reports she had palpitations with this. Says her pain has been bad with the infection but currently well controlled. Requests IV benadryl this morning with HD.       Objective     Last Recorded Vitals  /75   Pulse (!) 136   Temp 37.5 °C (99.5 °F) (Temporal)   Resp 21   Wt 67.8 kg (149 lb 7.6 oz)   SpO2 96%   Intake/Output last 3 Shifts:    Intake/Output Summary (Last 24 hours) at 5/2/2024 0754  Last data filed at 5/1/2024 1655  Gross per 24 hour   Intake 200 ml   Output 0 ml   Net 200 ml       Admission Weight  Weight: 63.7 kg (140 lb 6.9 oz) (04/27/24 2039)    Daily Weight  05/02/24 : 67.8 kg (149 lb 7.6 oz)    Image Results  Transesophageal Echo ([facility])             [place] [address]             Phone [phone]    TRANSESOPHAGEAL ECHOCARDIOGRAM REPORT       Patient Name:     BATSHEVA TEMPLE Reading Physician:   [id number] Flores DO  Study Date:       5/1/2024             Ordering Provider:   [id number] FLORES  MRN/PID:          [id number]             Fellow:  Accession#:       [id number]         Nurse:  Date of           1975 / [birth year]      Sonographer:         Dionte Vasquez RDCS  Birth/Age:        years  Gender:           F                    Additional Staff:  Height:           172.00 cm            Admit Date:  Weight:           70.31 kg             Admission Status:    Inpatient - Routine  BSA / BMI:         1.83 m2 / 23.77      Department Location: [facility] ICU                    kg/m2  Blood Pressure: 153 /72 mmHg    Study Type:    TRANSESOPHAGEAL ECHO ([facility])  Diagnosis/ICD: Viral endocarditis-B33.21  Indication:    Endocarditis  CPT Codes:     [facility] Complete-23133   Study Detail: The following Echo studies were performed: 2D, M-Mode and color                flow.       PHYSICIAN INTERPRETATION:  [facility] Details: The [facility]URA probe used was B21HOD. Technically adequate omniplane transesophageal echocardiogram performed.  [facility] Medication: The patient was sedated by Anesthesia; please refer to anesthesia flow sheet for medications used.  LAURA Procedure: The probe was passed without difficulty. The following complication was encountered during the procedure: Patient tolerated the procedure well without any apparent complications.  Left Ventricle: Left ventricular systolic function is normal. There are no regional wall motion abnormalities. The left ventricular cavity size is normal. Left ventricular diastolic filling was indeterminate.  Left Atrium: The left atrium is normal in size. There is a normal sized left atrial appendage, there is no thrombus visualized in the left atrial appendage and the left atrial appendage Doppler velocities are normal.  Right Ventricle: The right ventricle is normal in size. There is normal right ventricular global systolic function.  Right Atrium: The right atrium was not well visualized.  Aortic Valve: There is a prosthetic aortic valve present. Echo findings are consistent with normal aortic valve prosthesis structure and function. There is no evidence of aortic valve regurgitation.  Mitral Valve: There is a prosthetic mitral valve present. Echo findings are consistent with normal mitral valve prosthesis structure and function. There is trace mitral valve regurgitation. Trace prosthetic mitral regurgitation. Mobile echodensity previously seen is not visualized.  Tricuspid Valve: The tricuspid valve  was not well visualized. Tricuspid regurgitation was not assessed.  Pulmonic Valve: The pulmonic valve is not well visualized. The pulmonic valve regurgitation was not well visualized.  Pericardium: There is no pericardial effusion noted.  Aorta: The aortic root is normal.       CONCLUSIONS:   1. Left ventricular systolic function is normal.    QUANTITATIVE DATA SUMMARY:     [id number] Fabian Skaggs DO  Electronically signed on 5/1/2024 at 3:47:59 PM       ** Final **      Physical Exam  General: alert, no diaphoresis   Lungs: CTA BL   Heart: regular and tachy with ectopy, no LE edema BL   GI: abdomen soft, nontender, nondistended, BS present   MSK: no joint effusion or deformity   Skin: scabbed lesions noted on BL legs. No concerning areas of erythema   Neuro: grossly normal cognition, motor strength, sensation      Relevant Results               Assessment/Plan                  Principal Problem:    Sepsis, due to unspecified organism, unspecified whether acute organ dysfunction present (Multi)    MRSA bacteremia  Septic shock-- now resolved  - with history of recurrent MRSA bacteremia and infective endocarditis (aortic valve in 2020 and mitral in 2013 and 2020 replacements)  - on vancomycin- antibiotic course per Dr. Soto  - had a temporary dialysis line placed on admission in R groin- this was removed yesterday. She has a permacath in left chest wall but she has scarce venous access, and IR/vascular surgery have said that if we lose this line we will likely not be able to get a new one in. After dialysis it seems plan is to place a wire through permacath, remove the current permacath, and leave the wire in place. She will get a few days of line holiday and then new permacath to be placed over the guidewire.  - LAURA shows no vegetations  - rifampin    ESRD on HD  - as above-- HD today.     SVT  - runs of tachycardia early this am. Got IV lopressor with improvement. These cause palpitations for patient. Discussed with  Dr. Skaggs this morning, he reviewed tele and recommends increasing metoprolol-- I increased to 25 BID with plan to keep increasing if necessary  - patient with borderline hypokalemia and did get a dose of IV K this am but headed to dialysis shortly    Myalgias, body pain  - prn dilaudid which is helping    Pruritus  - continue benadryl, claritin, topicals    Anemia of chronic renal disease  - stable, no bleeding    Depression  - continue bupropion    HTN  - continue clonidine, hydralazine, PO lopressor    DVT ppx              Kinza Weiner,

## 2024-05-02 NOTE — PROGRESS NOTES
"Batsheva Page is a 48 y.o. female on day 4 of admission presenting with Sepsis, due to unspecified organism, unspecified whether acute organ dysfunction present (Multi).    Subjective   Patient seen for end-stage kidney disease patient is admitted with MRSA bacteremia he is on antibiotic therapy she is seen and examined on dialysis therapy tolerating procedure very well patient to go to interventional radiology department for removal of her dialysis catheter this afternoon fever no chills       Objective     Physical Exam  Neck:      Vascular: No carotid bruit.   Cardiovascular:      Rate and Rhythm: Normal rate and regular rhythm.      Heart sounds: Murmur heard.      No friction rub. No gallop.   Pulmonary:      Breath sounds: No wheezing, rhonchi or rales.   Chest:      Chest wall: No tenderness.   Abdominal:      General: There is no distension.      Tenderness: There is no abdominal tenderness. There is no guarding or rebound.   Musculoskeletal:         General: No swelling or tenderness.      Cervical back: Neck supple.      Right lower leg: No edema.      Left lower leg: No edema.   Lymphadenopathy:      Cervical: No cervical adenopathy.         Last Recorded Vitals  Blood pressure 170/90, pulse 83, temperature 36.1 °C (97 °F), temperature source Temporal, resp. rate 20, height 1.727 m (5' 8\"), weight 67.8 kg (149 lb 7.6 oz), SpO2 98%.    Intake/Output last 3 Shifts:  I/O last 3 completed shifts:  In: 200 (2.9 mL/kg) [I.V.:200 (2.9 mL/kg)]  Out: 0 (0 mL/kg)   Weight: 67.8 kg     Current Facility-Administered Medications:     acetaminophen (Tylenol) tablet 650 mg, 650 mg, oral, q6h PRN, Brittany Krueger PA-C    aspirin EC tablet 81 mg, 81 mg, oral, Daily, Brittany Krueger PA-C, 81 mg at 05/02/24 1154    atorvastatin (Lipitor) tablet 40 mg, 40 mg, oral, Nightly, Brittany Krueger PA-C, 40 mg at 05/01/24 2118    buPROPion (Wellbutrin) tablet 100 mg, 100 mg, oral, Every other day, Brittany Krueger PA-C, 100 mg " at 05/02/24 1153    calcitriol (Rocaltrol) capsule 0.5 mcg, 0.5 mcg, oral, Daily, Brittany Krueger PA-C, 0.5 mcg at 05/02/24 1153    cloNIDine (Catapres) tablet 0.1 mg, 0.1 mg, oral, q8h KIMBERLY, Brittany Krueger PA-C, 0.1 mg at 05/02/24 0506    dextrose 50 % injection 12.5 g, 12.5 g, intravenous, q15 min PRN, Brittany Krueger PA-C    dextrose 50 % injection 25 g, 25 g, intravenous, q15 min PRN, Brittany Krueger PA-C    diphenhydrAMINE (BENADryl) injection 25 mg, 25 mg, intravenous, q6h PRN, Brittany Krueger PA-C, 25 mg at 05/02/24 0805    epoetin brandy-epbx (RETACRIT) injection 6,500 Units, 6,500 Units, subcutaneous, Once per day on Monday Wednesday Friday, Brittany Krueger PA-C, 6,500 Units at 05/01/24 0832    ergocalciferol (Vitamin D-2) capsule 1,250 mcg, 1,250 mcg, oral, Every Sunday, Brittany Krueger PA-C    glucagon (Glucagen) injection 1 mg, 1 mg, intramuscular, q15 min PRN, Brittany Krueger PA-C    glucagon (Glucagen) injection 1 mg, 1 mg, intramuscular, q15 min PRN, Brittany Krueger PA-C    heparin (porcine) injection 5,000 Units, 5,000 Units, subcutaneous, q8h KIMBERLY, Brittany Krueger PA-C, 5,000 Units at 04/30/24 0535    heparin 1,000 unit/mL injection 1,000 Units, 1,000 Units, intra-catheter, After Dialysis, Brittany Krueger PA-C, 2,100 Units at 05/02/24 1133    heparin 1,000 unit/mL injection 1,000 Units, 1,000 Units, intra-catheter, After Dialysis, Brittany Krueger PA-C, 2,100 Units at 05/02/24 1133    heparin 1,000 unit/mL injection 1,000 Units, 1,000 Units, intra-catheter, After Dialysis, Kinza Weiner DO    heparin 1,000 unit/mL injection 1,000 Units, 1,000 Units, intra-catheter, After Dialysis, Kinza Weiner DO    hydrALAZINE (Apresoline) tablet 50 mg, 50 mg, oral, TID, Brittany Krueger PA-C, 50 mg at 05/02/24 1153    HYDROmorphone (Dilaudid) injection 0.2 mg, 0.2 mg, intravenous, q4h PRN, Brittany Krueger PA-C, 0.2 mg at 05/02/24 0930    hydrOXYzine pamoate (Vistaril) capsule 25 mg, 25 mg, oral, q8h PRN,  Brittany Krueger PA-C    levETIRAcetam (Keppra) tablet 500 mg, 500 mg, oral, Nightly, Brittany Krueger PA-C, 500 mg at 05/01/24 2100    loratadine (Claritin) tablet 10 mg, 10 mg, oral, q48h PRN, Brittany Krueger PA-C    metoprolol tartrate (Lopressor) tablet 12.5 mg, 12.5 mg, oral, BID, Brittany Krueger PA-C, 12.5 mg at 05/02/24 1153    oxyCODONE-acetaminophen (Percocet) 5-325 mg per tablet 1 tablet, 1 tablet, oral, q6h PRN, Brittany Krueger PA-C, 1 tablet at 05/02/24 0703    polyethylene glycol (Glycolax, Miralax) packet 17 g, 17 g, oral, Daily PRN, Brittany Krueger PA-C    pramoxine (Sarna Sensitive) 1 % lotion, , Topical, q8h PRN, Brittany Krueger PA-C    pregabalin (Lyrica) capsule 50 mg, 50 mg, oral, BID, Brittany Krueger PA-C, 50 mg at 05/02/24 1153    promethazine (Phenergan) tablet 25 mg, 25 mg, oral, Daily PRN, Brittany Krueger PA-C, 25 mg at 05/02/24 1223    rifAMPin (Rifadin) capsule 300 mg, 300 mg, oral, q8h, Brittany Krueger PA-C, 300 mg at 05/02/24 0506    vancomycin (Vancocin) pharmacy to dose - pharmacy monitoring, , miscellaneous, Daily PRN, Brittany Krueger PA-C    vancomycin-diluent combo no.1 (Xellia) IVPB 750 mg, 750 mg, intravenous, Once per day on Tuesday Thursday Saturday, Brittany Krueger PA-C   Relevant Results    Results for orders placed or performed during the hospital encounter of 04/27/24 (from the past 96 hour(s))   POCT GLUCOSE   Result Value Ref Range    POCT Glucose 75 74 - 99 mg/dL   POCT GLUCOSE   Result Value Ref Range    POCT Glucose 88 74 - 99 mg/dL   CBC and Auto Differential   Result Value Ref Range    WBC 6.6 4.4 - 11.3 x10*3/uL    nRBC 0.0 0.0 - 0.0 /100 WBCs    RBC 2.88 (L) 4.00 - 5.20 x10*6/uL    Hemoglobin 7.4 (L) 12.0 - 16.0 g/dL    Hematocrit 24.9 (L) 36.0 - 46.0 %    MCV 87 80 - 100 fL    MCH 25.7 (L) 26.0 - 34.0 pg    MCHC 29.7 (L) 32.0 - 36.0 g/dL    RDW 18.7 (H) 11.5 - 14.5 %    Platelets 145 (L) 150 - 450 x10*3/uL    Neutrophils % 85.2 40.0 - 80.0 %    Immature Granulocytes  %, Automated 0.3 0.0 - 0.9 %    Lymphocytes % 6.5 13.0 - 44.0 %    Monocytes % 7.7 2.0 - 10.0 %    Eosinophils % 0.0 0.0 - 6.0 %    Basophils % 0.3 0.0 - 2.0 %    Neutrophils Absolute 5.66 1.20 - 7.70 x10*3/uL    Immature Granulocytes Absolute, Automated 0.02 0.00 - 0.70 x10*3/uL    Lymphocytes Absolute 0.43 (L) 1.20 - 4.80 x10*3/uL    Monocytes Absolute 0.51 0.10 - 1.00 x10*3/uL    Eosinophils Absolute 0.00 0.00 - 0.70 x10*3/uL    Basophils Absolute 0.02 0.00 - 0.10 x10*3/uL   Basic Metabolic Panel   Result Value Ref Range    Glucose 130 (H) 65 - 99 mg/dL    Sodium 132 (L) 133 - 145 mmol/L    Potassium 3.7 3.4 - 5.1 mmol/L    Chloride 94 (L) 97 - 107 mmol/L    Bicarbonate 25 24 - 31 mmol/L    Urea Nitrogen 13 8 - 25 mg/dL    Creatinine 3.20 (H) 0.40 - 1.60 mg/dL    eGFR 17 (L) >60 mL/min/1.73m*2    Calcium 8.3 (L) 8.5 - 10.4 mg/dL    Anion Gap 13 <=19 mmol/L   Magnesium   Result Value Ref Range    Magnesium 1.90 1.60 - 3.10 mg/dL   Phosphorus   Result Value Ref Range    Phosphorus 2.8 2.5 - 4.5 mg/dL   POCT GLUCOSE   Result Value Ref Range    POCT Glucose 109 (H) 74 - 99 mg/dL   Vancomycin   Result Value Ref Range    Vancomycin 17.1 10.0 - 20.0 ug/mL   POCT GLUCOSE   Result Value Ref Range    POCT Glucose 97 74 - 99 mg/dL   CBC and Auto Differential   Result Value Ref Range    WBC 7.0 4.4 - 11.3 x10*3/uL    nRBC 0.0 0.0 - 0.0 /100 WBCs    RBC 3.14 (L) 4.00 - 5.20 x10*6/uL    Hemoglobin 8.0 (L) 12.0 - 16.0 g/dL    Hematocrit 26.7 (L) 36.0 - 46.0 %    MCV 85 80 - 100 fL    MCH 25.5 (L) 26.0 - 34.0 pg    MCHC 30.0 (L) 32.0 - 36.0 g/dL    RDW 18.9 (H) 11.5 - 14.5 %    Platelets 151 150 - 450 x10*3/uL    Neutrophils % 77.9 40.0 - 80.0 %    Immature Granulocytes %, Automated 0.3 0.0 - 0.9 %    Lymphocytes % 9.4 13.0 - 44.0 %    Monocytes % 11.6 2.0 - 10.0 %    Eosinophils % 0.4 0.0 - 6.0 %    Basophils % 0.4 0.0 - 2.0 %    Neutrophils Absolute 5.45 1.20 - 7.70 x10*3/uL    Immature Granulocytes Absolute, Automated 0.02  0.00 - 0.70 x10*3/uL    Lymphocytes Absolute 0.66 (L) 1.20 - 4.80 x10*3/uL    Monocytes Absolute 0.81 0.10 - 1.00 x10*3/uL    Eosinophils Absolute 0.03 0.00 - 0.70 x10*3/uL    Basophils Absolute 0.03 0.00 - 0.10 x10*3/uL   Renal function panel   Result Value Ref Range    Glucose 110 (H) 65 - 99 mg/dL    Sodium 132 (L) 133 - 145 mmol/L    Potassium 4.3 3.4 - 5.1 mmol/L    Chloride 92 (L) 97 - 107 mmol/L    Bicarbonate 22 (L) 24 - 31 mmol/L    Urea Nitrogen 19 8 - 25 mg/dL    Creatinine 4.00 (H) 0.40 - 1.60 mg/dL    eGFR 13 (L) >60 mL/min/1.73m*2    Calcium 9.0 8.5 - 10.4 mg/dL    Phosphorus 4.3 2.5 - 4.5 mg/dL    Albumin 3.3 (L) 3.5 - 5.0 g/dL    Anion Gap 18 <=19 mmol/L   Magnesium   Result Value Ref Range    Magnesium 2.40 1.60 - 3.10 mg/dL   Transthoracic Echo (TTE) Complete   Result Value Ref Range    AV pk peggy 3.56 m/s    AV mn grad 27.0 mmHg    MV E/A ratio 0.99     Tricuspid annular plane systolic excursion 1.8 cm    LV Biplane EF 62 %    LA vol index A/L 26.2 ml/m2    RV free wall pk S' 12.50 cm/s    RVSP 29.6 mmHg    LVIDd 3.93 cm    AV pk grad 50.7 mmHg    LV A4C EF 59.6    POCT GLUCOSE   Result Value Ref Range    POCT Glucose 111 (H) 74 - 99 mg/dL   POCT GLUCOSE   Result Value Ref Range    POCT Glucose 113 (H) 74 - 99 mg/dL   POCT GLUCOSE   Result Value Ref Range    POCT Glucose 126 (H) 74 - 99 mg/dL   POCT GLUCOSE   Result Value Ref Range    POCT Glucose 129 (H) 74 - 99 mg/dL   POCT GLUCOSE   Result Value Ref Range    POCT Glucose 115 (H) 74 - 99 mg/dL   CBC and Auto Differential   Result Value Ref Range    WBC 5.4 4.4 - 11.3 x10*3/uL    nRBC 0.0 0.0 - 0.0 /100 WBCs    RBC 2.70 (L) 4.00 - 5.20 x10*6/uL    Hemoglobin 6.9 (L) 12.0 - 16.0 g/dL    Hematocrit 23.8 (L) 36.0 - 46.0 %    MCV 88 80 - 100 fL    MCH 25.6 (L) 26.0 - 34.0 pg    MCHC 29.0 (L) 32.0 - 36.0 g/dL    RDW 18.8 (H) 11.5 - 14.5 %    Platelets 143 (L) 150 - 450 x10*3/uL    Neutrophils % 73.7 40.0 - 80.0 %    Immature Granulocytes %, Automated  0.4 0.0 - 0.9 %    Lymphocytes % 11.1 13.0 - 44.0 %    Monocytes % 10.9 2.0 - 10.0 %    Eosinophils % 3.3 0.0 - 6.0 %    Basophils % 0.6 0.0 - 2.0 %    Neutrophils Absolute 3.97 1.20 - 7.70 x10*3/uL    Immature Granulocytes Absolute, Automated 0.02 0.00 - 0.70 x10*3/uL    Lymphocytes Absolute 0.60 (L) 1.20 - 4.80 x10*3/uL    Monocytes Absolute 0.59 0.10 - 1.00 x10*3/uL    Eosinophils Absolute 0.18 0.00 - 0.70 x10*3/uL    Basophils Absolute 0.03 0.00 - 0.10 x10*3/uL   Basic Metabolic Panel   Result Value Ref Range    Glucose 107 (H) 65 - 99 mg/dL    Sodium 131 (L) 133 - 145 mmol/L    Potassium 4.4 3.4 - 5.1 mmol/L    Chloride 93 (L) 97 - 107 mmol/L    Bicarbonate 23 (L) 24 - 31 mmol/L    Urea Nitrogen 32 (H) 8 - 25 mg/dL    Creatinine 5.50 (H) 0.40 - 1.60 mg/dL    eGFR 9 (L) >60 mL/min/1.73m*2    Calcium 8.4 (L) 8.5 - 10.4 mg/dL    Anion Gap 15 <=19 mmol/L   Magnesium   Result Value Ref Range    Magnesium 2.30 1.60 - 3.10 mg/dL   Phosphorus   Result Value Ref Range    Phosphorus 3.9 2.5 - 4.5 mg/dL   Calcium, ionized   Result Value Ref Range    POCT Calcium, Ionized 1.09 (L) 1.1 - 1.33 mmol/L   Morphology   Result Value Ref Range    RBC Morphology See Below     Polychromasia Mild     Hypochromia Mild    Type and screen   Result Value Ref Range    ABO TYPE A     Rh TYPE NEG     ANTIBODY SCREEN NEG    POCT GLUCOSE   Result Value Ref Range    POCT Glucose 104 (H) 74 - 99 mg/dL   Prepare RBC: 1 Units   Result Value Ref Range    PRODUCT CODE V5028G83     Unit Number [id number]     Unit ABO A     Unit RH NEG     XM INTEP COMP     Dispense Status TR     Blood Expiration Date May 02, 2024 23:59 EDT     PRODUCT BLOOD TYPE 0600     UNIT VOLUME 350    Blood Culture    Specimen: Dialysis; Blood culture   Result Value Ref Range    Blood Culture No growth at 1 day    Blood Culture    Specimen: Central Line/Catheter (Specify below); Blood culture   Result Value Ref Range    Blood Culture No growth at 1 day    CBC and Auto  Differential   Result Value Ref Range    WBC 4.8 4.4 - 11.3 x10*3/uL    nRBC 0.0 0.0 - 0.0 /100 WBCs    RBC 3.24 (L) 4.00 - 5.20 x10*6/uL    Hemoglobin 8.2 (L) 12.0 - 16.0 g/dL    Hematocrit 27.2 (L) 36.0 - 46.0 %    MCV 84 80 - 100 fL    MCH 25.3 (L) 26.0 - 34.0 pg    MCHC 30.1 (L) 32.0 - 36.0 g/dL    RDW 19.7 (H) 11.5 - 14.5 %    Platelets 151 150 - 450 x10*3/uL    Neutrophils % 65.6 40.0 - 80.0 %    Immature Granulocytes %, Automated 0.6 0.0 - 0.9 %    Lymphocytes % 15.4 13.0 - 44.0 %    Monocytes % 14.0 2.0 - 10.0 %    Eosinophils % 4.0 0.0 - 6.0 %    Basophils % 0.4 0.0 - 2.0 %    Neutrophils Absolute 3.14 1.20 - 7.70 x10*3/uL    Immature Granulocytes Absolute, Automated 0.03 0.00 - 0.70 x10*3/uL    Lymphocytes Absolute 0.74 (L) 1.20 - 4.80 x10*3/uL    Monocytes Absolute 0.67 0.10 - 1.00 x10*3/uL    Eosinophils Absolute 0.19 0.00 - 0.70 x10*3/uL    Basophils Absolute 0.02 0.00 - 0.10 x10*3/uL   Basic Metabolic Panel   Result Value Ref Range    Glucose 113 (H) 65 - 99 mg/dL    Sodium 134 133 - 145 mmol/L    Potassium 3.6 3.4 - 5.1 mmol/L    Chloride 96 (L) 97 - 107 mmol/L    Bicarbonate 24 24 - 31 mmol/L    Urea Nitrogen 11 8 - 25 mg/dL    Creatinine 3.00 (H) 0.40 - 1.60 mg/dL    eGFR 19 (L) >60 mL/min/1.73m*2    Calcium 8.5 8.5 - 10.4 mg/dL    Anion Gap 14 <=19 mmol/L   Magnesium   Result Value Ref Range    Magnesium 2.00 1.60 - 3.10 mg/dL   Phosphorus   Result Value Ref Range    Phosphorus 2.2 (L) 2.5 - 4.5 mg/dL   Troponin T   Result Value Ref Range    Troponin T, High Sensitivity 42 (HH) <=14 ng/L   Troponin T   Result Value Ref Range    Troponin T, High Sensitivity 41 (HH) <=14 ng/L   CBC and Auto Differential   Result Value Ref Range    WBC 6.0 4.4 - 11.3 x10*3/uL    nRBC 0.0 0.0 - 0.0 /100 WBCs    RBC 3.67 (L) 4.00 - 5.20 x10*6/uL    Hemoglobin 9.2 (L) 12.0 - 16.0 g/dL    Hematocrit 31.8 (L) 36.0 - 46.0 %    MCV 87 80 - 100 fL    MCH 25.1 (L) 26.0 - 34.0 pg    MCHC 28.9 (L) 32.0 - 36.0 g/dL    RDW 19.9  (H) 11.5 - 14.5 %    Platelets 179 150 - 450 x10*3/uL    Neutrophils % 73.0 40.0 - 80.0 %    Immature Granulocytes %, Automated 1.3 (H) 0.0 - 0.9 %    Lymphocytes % 10.6 13.0 - 44.0 %    Monocytes % 9.9 2.0 - 10.0 %    Eosinophils % 4.9 0.0 - 6.0 %    Basophils % 0.3 0.0 - 2.0 %    Neutrophils Absolute 4.35 1.20 - 7.70 x10*3/uL    Immature Granulocytes Absolute, Automated 0.08 0.00 - 0.70 x10*3/uL    Lymphocytes Absolute 0.63 (L) 1.20 - 4.80 x10*3/uL    Monocytes Absolute 0.59 0.10 - 1.00 x10*3/uL    Eosinophils Absolute 0.29 0.00 - 0.70 x10*3/uL    Basophils Absolute 0.02 0.00 - 0.10 x10*3/uL   Basic Metabolic Panel   Result Value Ref Range    Glucose 121 (H) 65 - 99 mg/dL    Sodium 136 133 - 145 mmol/L    Potassium 3.4 3.4 - 5.1 mmol/L    Chloride 96 (L) 97 - 107 mmol/L    Bicarbonate 22 (L) 24 - 31 mmol/L    Urea Nitrogen 17 8 - 25 mg/dL    Creatinine 5.30 (H) 0.40 - 1.60 mg/dL    eGFR 9 (L) >60 mL/min/1.73m*2    Calcium 8.7 8.5 - 10.4 mg/dL    Anion Gap 18 <=19 mmol/L   Magnesium   Result Value Ref Range    Magnesium 2.50 1.60 - 3.10 mg/dL   Phosphorus   Result Value Ref Range    Phosphorus 2.8 2.5 - 4.5 mg/dL   Vancomycin   Result Value Ref Range    Vancomycin 25.3 (H) 10.0 - 20.0 ug/mL       Assessment/Plan   End stage kidney disease continue dialysis on Tuesday Thursday Saturday  Hyperkalemia resolved  Septic shock improved going for removal of her dialysis catheter today continue antibiotics per infectious disease  Anemia of chronic kidney disease  Hypertension  Aortic and mitral valve replacements                   Zhou Pedersen MD

## 2024-05-02 NOTE — PROGRESS NOTES
"Palliative Care Progress Note    Date of Admission: 4/27/2024    Patient is a 48 y.o. female admitted with Sepsis, due to unspecified organism, unspecified whether acute organ dysfunction present (Multi). Had a long night, bouts of palpitations, read as mix of SVT and VT on monitor. Resolved with IV lopressor and potassium. Feeling tired today as a result. Planned for dialysis catheter exchange this afternoon.     Mental/Cognitive Status: anxious    Respiratory Status: denies difficulty with breathing    Pain Assessment: \"all over\" but states adequately controlled with prn Dilaudid    Pertinent Symptoms: anxiety    Diet/Nutrition: poor appetite    Bowel Regimen: prn Miralax    Patient's current condition/Anticipated Prognosis: poor.  Family/Healthcare Proxy involvement: pt capable, daughter Xiao is stated POA.    Scheduled medications  aspirin, 81 mg, oral, Daily  atorvastatin, 40 mg, oral, Nightly  buPROPion, 100 mg, oral, Every other day  calcitriol, 0.5 mcg, oral, Daily  cloNIDine, 0.1 mg, oral, q8h KIMBERLY  epoetin brandy-epbx, 6,500 Units, subcutaneous, Once per day on Monday Wednesday Friday  ergocalciferol, 1,250 mcg, oral, Every Sunday  heparin (porcine), 5,000 Units, subcutaneous, q8h KIMBERLY  heparin, 1,000 Units, intra-catheter, After Dialysis  heparin, 1,000 Units, intra-catheter, After Dialysis  heparin, 1,000 Units, intra-catheter, After Dialysis  heparin, 1,000 Units, intra-catheter, After Dialysis  hydrALAZINE, 50 mg, oral, TID  levETIRAcetam, 500 mg, oral, Nightly  metoprolol tartrate, 12.5 mg, oral, BID  pregabalin, 50 mg, oral, BID  rifAMPin, 300 mg, oral, q8h  vancomycin-diluent combo no.1, 750 mg, intravenous, Once per day on Tuesday Thursday Saturday      Continuous medications     PRN medications  PRN medications: acetaminophen, dextrose, dextrose, diphenhydrAMINE, glucagon, glucagon, HYDROmorphone, hydrOXYzine pamoate, loratadine, oxyCODONE-acetaminophen, polyethylene glycol, pramoxine, " promethazine, vancomycin     Results for orders placed or performed during the hospital encounter of 04/27/24 (from the past 24 hour(s))   CBC and Auto Differential   Result Value Ref Range    WBC 6.0 4.4 - 11.3 x10*3/uL    nRBC 0.0 0.0 - 0.0 /100 WBCs    RBC 3.67 (L) 4.00 - 5.20 x10*6/uL    Hemoglobin 9.2 (L) 12.0 - 16.0 g/dL    Hematocrit 31.8 (L) 36.0 - 46.0 %    MCV 87 80 - 100 fL    MCH 25.1 (L) 26.0 - 34.0 pg    MCHC 28.9 (L) 32.0 - 36.0 g/dL    RDW 19.9 (H) 11.5 - 14.5 %    Platelets 179 150 - 450 x10*3/uL    Neutrophils % 73.0 40.0 - 80.0 %    Immature Granulocytes %, Automated 1.3 (H) 0.0 - 0.9 %    Lymphocytes % 10.6 13.0 - 44.0 %    Monocytes % 9.9 2.0 - 10.0 %    Eosinophils % 4.9 0.0 - 6.0 %    Basophils % 0.3 0.0 - 2.0 %    Neutrophils Absolute 4.35 1.20 - 7.70 x10*3/uL    Immature Granulocytes Absolute, Automated 0.08 0.00 - 0.70 x10*3/uL    Lymphocytes Absolute 0.63 (L) 1.20 - 4.80 x10*3/uL    Monocytes Absolute 0.59 0.10 - 1.00 x10*3/uL    Eosinophils Absolute 0.29 0.00 - 0.70 x10*3/uL    Basophils Absolute 0.02 0.00 - 0.10 x10*3/uL   Basic Metabolic Panel   Result Value Ref Range    Glucose 121 (H) 65 - 99 mg/dL    Sodium 136 133 - 145 mmol/L    Potassium 3.4 3.4 - 5.1 mmol/L    Chloride 96 (L) 97 - 107 mmol/L    Bicarbonate 22 (L) 24 - 31 mmol/L    Urea Nitrogen 17 8 - 25 mg/dL    Creatinine 5.30 (H) 0.40 - 1.60 mg/dL    eGFR 9 (L) >60 mL/min/1.73m*2    Calcium 8.7 8.5 - 10.4 mg/dL    Anion Gap 18 <=19 mmol/L   Magnesium   Result Value Ref Range    Magnesium 2.50 1.60 - 3.10 mg/dL   Phosphorus   Result Value Ref Range    Phosphorus 2.8 2.5 - 4.5 mg/dL   Vancomycin   Result Value Ref Range    Vancomycin 25.3 (H) 10.0 - 20.0 ug/mL        Transesophageal Echo (LAURA)  Result Date: 5/1/2024  PHYSICIAN INTERPRETATION: LAURA Details: The LAURA probe used was [id number]. Technically adequate omniplane transesophageal echocardiogram performed. LAURA Medication: The patient was sedated by Anesthesia; please refer to  anesthesia flow sheet for medications used. LAURA Procedure: The probe was passed without difficulty. The following complication was encountered during the procedure: Patient tolerated the procedure well without any apparent complications. Left Ventricle: Left ventricular systolic function is normal. There are no regional wall motion abnormalities. The left ventricular cavity size is normal. Left ventricular diastolic filling was indeterminate. Left Atrium: The left atrium is normal in size. There is a normal sized left atrial appendage, there is no thrombus visualized in the left atrial appendage and the left atrial appendage Doppler velocities are normal. Right Ventricle: The right ventricle is normal in size. There is normal right ventricular global systolic function. Right Atrium: The right atrium was not well visualized. Aortic Valve: There is a prosthetic aortic valve present. Echo findings are consistent with normal aortic valve prosthesis structure and function. There is no evidence of aortic valve regurgitation. Mitral Valve: There is a prosthetic mitral valve present. Echo findings are consistent with normal mitral valve prosthesis structure and function. There is trace mitral valve regurgitation. Trace prosthetic mitral regurgitation. Mobile echodensity previously seen is not visualized. Tricuspid Valve: The tricuspid valve was not well visualized. Tricuspid regurgitation was not assessed. Pulmonic Valve: The pulmonic valve is not well visualized. The pulmonic valve regurgitation was not well visualized. Pericardium: There is no pericardial effusion noted. Aorta: The aortic root is normal.  CONCLUSIONS:  1. Left ventricular systolic function is normal. QUANTITATIVE DATA SUMMARY:  [id number] Fabian Skaggs DO Electronically signed on 5/1/2024 at 3:47:59 PM  ** Final **     Transthoracic Echo (TTE) Complete  Result Date: 4/29/2024  PHYSICIAN INTERPRETATION: Left Ventricle: Left ventricular systolic function is  normal, with an estimated ejection fraction of 65-70%. There are no regional wall motion abnormalities. The left ventricular cavity size is normal. Spectral Doppler shows an impaired relaxation pattern of left ventricular diastolic filling. Left Atrium: The left atrium is normal in size. Right Ventricle: The right ventricle is normal in size. There is normal right ventricular global systolic function. Right Atrium: The right atrium is normal in size. Aortic Valve: There is a prosthetic aortic valve present. There is bioprosthetic aortic valve. Echo findings are consistent with normal aortic valve prosthesis structure and function. There is no evidence of aortic valve regurgitation. The peak instantaneous gradient of the aortic valve is 50.7 mmHg. The mean gradient of the aortic valve is 27.0 mmHg. Mitral Valve: There is a prosthetic mitral valve present. There is a normally functioning mitral valve prosthesis. There is no evidence of mitral valve regurgitation. Tricuspid Valve: The tricuspid valve is structurally normal. There is trace tricuspid regurgitation. Pulmonic Valve: The pulmonic valve is not well visualized. The pulmonic valve regurgitation was not well visualized. Pericardium: There is no pericardial effusion noted. Aorta: The aortic root is normal.  CONCLUSIONS:  1. Left ventricular systolic function is normal with a 65-70% estimated ejection fraction.  2. Spectral Doppler shows an impaired relaxation pattern of left ventricular diastolic filling.  3. There is a normally functioning mitral valve prosthesis.  4. There is a bioprosthetic aortic valve.     Vitals:    05/02/24 1336   BP: 153/77   Pulse:    Resp:    Temp:    SpO2:        Physical Exam  Vitals and nursing note reviewed.   Constitutional:       General: She is not in acute distress.     Appearance: Normal appearance. She is not toxic-appearing.   HENT:      Head: Normocephalic and atraumatic.      Mouth/Throat:      Mouth: Mucous membranes are  moist.      Pharynx: Oropharynx is clear.   Eyes:      General: No scleral icterus.     Extraocular Movements: Extraocular movements intact.      Conjunctiva/sclera: Conjunctivae normal.   Neck:      Comments: Neck symmetrical  Cardiovascular:      Rate and Rhythm: Normal rate and regular rhythm.      Comments: On tele  Pulmonary:      Effort: Pulmonary effort is normal. No respiratory distress.      Breath sounds: No wheezing.   Skin:     General: Skin is warm and dry.   Neurological:      General: No focal deficit present.      Mental Status: She is alert and oriented to person, place, and time.   Psychiatric:         Mood and Affect: Mood normal.         Behavior: Behavior normal.         Thought Content: Thought content normal.         Judgment: Judgment normal.          Assessment/Plan   Sepsis, due to unspecified organism, unspecified whether acute organ dysfunction present (Multi)   IMP:    MRSA septicemia - 2/2 HD cath infected line. ID is following. LAURA negative vegetation. She is going down later today for exchange of tunneled catheter.  ESRD on HD - dialysis Tues/Thurs/Sat, Dr. Pedersen following  Chronic pain - prn dilaudid has been effective per the patient adequately controlled  Anxiety - has order for Vistaril PRN, she has not used. Encouraged her to use if she feels needed. Cont bupropion; BH consult ordered, await their input  Valve disease - history of replacement aortic and mitral valves    Palliative Care Encounter  DNR-CCA-DNI  Pt is capable decision maker.  Daughter, Xiao is hc-POA but we do not have this documentation in record. Care coordination to facilitate for chart entry.  Pt wishes to continue in aggressive disease modifying model of care at this time with plan for IR today for dialysis catheter exchange.   No changes today from palliative standpoint.    Advance Directives Info: No copy of hc-POA in EMR.  Discharge Planning: not stable for discharge at this time  Palliative Care Team  will continue to follow patient.      Karina Wu, APRN-CNP

## 2024-05-02 NOTE — CONSULTS
Inpatient consult to Psychiatry  Consult performed by: ORI Samuels-SAL  Consult ordered by: Brittany Krueger PA-C  Reason for consult: depression anxiety coping very medically complicated          History Of Present Illness  Batsheva Page is a 48 y.o. female presenting with Sepsis, due to unspecified organism, unspecified whether acute organ dysfunction present (Multi).      During today's face-to-face interaction with the patient, we obtained permission and explained that any statements that could jeopardize patient safety or the safety of others would need to be reported appropriately, otherwise the interaction would remain confidential. The patient appeared to be alert and oriented times four with no apparent distress (NAD).    The patient, who has been on dialysis for 20 years, expressed that everything that could go wrong has gone wrong in her health journey. She mentioned that her condition has worsened due to an infection and shared that she has had two open-heart surgeries on top of that. Additionally, she disclosed that she has experienced a stroke.    The patient expressed that she is currently experiencing difficulty walking and that treatments have been taking a toll on her body, resulting in overall pain. She reported feeling frustrated with a pain clinic physician who was unable to provide her with sufficient help for pain management. This frustration, coupled with ongoing feelings of hopelessness, has contributed to her depressive state which has been present for the past six months. However, the patient emphasized that she will never do anything to harm her daughter, whom she loves, or her partner, with whom she has been together for 20 years.    The patient denied having mood swings or feelings of euphoria, but did report irritability, especially regarding cardiac issues and lab work at night. She shared that she has considered discontinuing dialysis due to feeling like a burden to her  family. Seizures have caused her to stop driving, which has made her dependent on others, leading to concerns about exhausting her loved ones. The patient expressed high levels of anxiety, but no panic attacks or phobias were observed.    Sleep quality was reported as poor, with instances of sleeping only two hours or even going without sleep for four consecutive days. The patient stated that her appetite is fine and denied any significant weight gain or loss, although her weight fluctuates. She reported low energy levels but demonstrated good concentration and impulse control. The patient denied having any auditory or visual hallucinations, as well as any current suicidal or homicidal ideation or plans. There was no history of past suicide attempts.    The patient described her pain as constant and rated it as 10 out of 10, with a generalized distribution throughout her body. However, she expressed appreciation for the management of her pain while here. Nausea was also reported, and the nurse provided treatment for it towards the end of the interview. The patient lives with her boyfriend of 20 years named Chava, states she feels safe and that he looks after her and states when she is very sick he even carries her and makes sure she does not miss her medical appointments.  She denies smoking, alcohol or drug use. There was no history of psychiatric issues, abuse, or trauma, whether physical, sexual, or emotional. The patient stated that she feels safe at home and has access to firearms, but has never had any trouble with the law.    We explained to the patient that will try Remeron as Wellbutrin is contraindicated due to history of seizures . We also educated the patient to inform the nurse if her anxiety worsens, as she has hydroxyzine prescribed as needed every eight hours. Additionally, we offered to start the patient on melatonin after providing proper education. The patient was given the opportunity to ask  questions and voice concerns, which we believe were addressed satisfactorily. We assured the patient that we would continue to follow up with her again tomorrow.    Past Medical History  She has a past medical history of Aortic valve replaced (2022), CHF (congestive heart failure) (Multi), Chronic pain, Coronary artery disease, Disease of thyroid gland, ESRD (end stage renal disease) (Multi), H/O mitral valve replacement (2013), Heart disease, History of transfusion, Hypertension, Mitral valve regurgitation, Seizures (Multi), and Stroke (Multi).    Surgical History  She has a past surgical history that includes IR CVC tunneled (09/09/2022); IR CVC tunneled (12/28/2022); US guided percutaneous placement (07/14/2022); Parathyroidectomy; Coronary artery bypass graft; Mitral valve replacement (2013); Aortic valve replacement (2020); and Mitral valve replacement (2020).     Social History  She reports that she has never smoked. She has never used smokeless tobacco. She reports that she does not drink alcohol and does not use drugs.    Abuse/Trauma  none    Past Treatment   Inpatient: None    Outpatient: medication      Family Psychiatric History  There has been no family history of psychological problems    Past Medications  Wellbutrin     Substance Abuse  Denied by patient        Access to Fire Arms and/or Weapons: Denies    Legal Issues: Denies    Allergies  Kayexalate, Metoclopramide hcl, Prochlorperazine, Zofran [ondansetron hcl], and Ondansetron    Review of Systems   Constitutional:  Positive for activity change and fatigue.   Gastrointestinal:  Positive for nausea.   Musculoskeletal:  Positive for arthralgias and myalgias.   Neurological:  Positive for seizures and weakness.   Psychiatric/Behavioral:  Positive for dysphoric mood and sleep disturbance. Negative for agitation, behavioral problems, confusion, decreased concentration, hallucinations, self-injury and suicidal ideas. The patient is nervous/anxious. The  patient is not hyperactive.          Mental Status Exam  General: alert   Appearance: Well groomed, appropriate eye contact.  Attitude/Behavior:Cooperative, conversant, engaged, and with good eye contact.  Motor Activity: No psychomotor agitation or retardation, no tremor or other abnormal movements.  Speech: normal rate, tone and rhythm  Mood: anxious, depressed, and irritable  Affect: normal and mood-congruent  Thought Process: linear, goal directed and circumstantial  Thought Content: Within normal limits  Thought Perception: No perceptual abnormalities noted  Cognition: Cognitively intact to conversational testing with respect to attention, orientation, fund of knowledge, recent and remote memory, and language.  Insight: intact  Judgement: Intact    Psychiatric Risk Assessment  Violence Risk Assessment: none  Acute Risk of Harm to Others is Considered: low   Suicide Risk Assessment: , chronic medical illness, and chronic pain  Protective Factors against Suicide: adherence to  treatment, marriage/partnership, moral objections to suicide, positive family relationships, and sense of responsibility toward family  Acute Risk of Harm to Self is Considered: low    Current Medications    Scheduled medications  aspirin, 81 mg, oral, Daily  atorvastatin, 40 mg, oral, Nightly  buPROPion, 100 mg, oral, Every other day  calcitriol, 0.5 mcg, oral, Daily  cloNIDine, 0.1 mg, oral, q8h KIMBERLY  epoetin brandy-epbx, 6,500 Units, subcutaneous, Once per day on Monday Wednesday Friday  ergocalciferol, 1,250 mcg, oral, Every Sunday  heparin (porcine), 5,000 Units, subcutaneous, q8h KIMBERLY  heparin, 1,000 Units, intra-catheter, After Dialysis  heparin, 1,000 Units, intra-catheter, After Dialysis  heparin, 1,000 Units, intra-catheter, After Dialysis  heparin, 1,000 Units, intra-catheter, After Dialysis  hydrALAZINE, 50 mg, oral, TID  levETIRAcetam, 500 mg, oral, Nightly  metoprolol tartrate, 25 mg, oral, BID  pregabalin, 50 mg, oral,  BID  rifAMPin, 300 mg, oral, q8h  vancomycin-diluent combo no.1, 750 mg, intravenous, Once per day on Tuesday Thursday Saturday      Continuous medications     PRN medications  PRN medications: acetaminophen, dextrose, dextrose, diphenhydrAMINE, glucagon, glucagon, HYDROmorphone, hydrOXYzine pamoate, loratadine, oxyCODONE-acetaminophen, polyethylene glycol, pramoxine, promethazine, vancomycin         Relevant Results        @[facility]@    Relevant Results  Results for orders placed or performed during the hospital encounter of 04/27/24 (from the past 24 hour(s))   CBC and Auto Differential   Result Value Ref Range    WBC 6.0 4.4 - 11.3 x10*3/uL    nRBC 0.0 0.0 - 0.0 /100 WBCs    RBC 3.67 (L) 4.00 - 5.20 x10*6/uL    Hemoglobin 9.2 (L) 12.0 - 16.0 g/dL    Hematocrit 31.8 (L) 36.0 - 46.0 %    MCV 87 80 - 100 fL    MCH 25.1 (L) 26.0 - 34.0 pg    MCHC 28.9 (L) 32.0 - 36.0 g/dL    RDW 19.9 (H) 11.5 - 14.5 %    Platelets 179 150 - 450 x10*3/uL    Neutrophils % 73.0 40.0 - 80.0 %    Immature Granulocytes %, Automated 1.3 (H) 0.0 - 0.9 %    Lymphocytes % 10.6 13.0 - 44.0 %    Monocytes % 9.9 2.0 - 10.0 %    Eosinophils % 4.9 0.0 - 6.0 %    Basophils % 0.3 0.0 - 2.0 %    Neutrophils Absolute 4.35 1.20 - 7.70 x10*3/uL    Immature Granulocytes Absolute, Automated 0.08 0.00 - 0.70 x10*3/uL    Lymphocytes Absolute 0.63 (L) 1.20 - 4.80 x10*3/uL    Monocytes Absolute 0.59 0.10 - 1.00 x10*3/uL    Eosinophils Absolute 0.29 0.00 - 0.70 x10*3/uL    Basophils Absolute 0.02 0.00 - 0.10 x10*3/uL   Basic Metabolic Panel   Result Value Ref Range    Glucose 121 (H) 65 - 99 mg/dL    Sodium 136 133 - 145 mmol/L    Potassium 3.4 3.4 - 5.1 mmol/L    Chloride 96 (L) 97 - 107 mmol/L    Bicarbonate 22 (L) 24 - 31 mmol/L    Urea Nitrogen 17 8 - 25 mg/dL    Creatinine 5.30 (H) 0.40 - 1.60 mg/dL    eGFR 9 (L) >60 mL/min/1.73m*2    Calcium 8.7 8.5 - 10.4 mg/dL    Anion Gap 18 <=19 mmol/L   Magnesium   Result Value Ref Range    Magnesium 2.50 1.60 - 3.10  mg/dL   Phosphorus   Result Value Ref Range    Phosphorus 2.8 2.5 - 4.5 mg/dL   Vancomycin   Result Value Ref Range    Vancomycin 25.3 (H) 10.0 - 20.0 ug/mL      Reviewed     Assessment/Plan   Principal Problem:    Sepsis, due to unspecified organism, unspecified whether acute organ dysfunction present (Multi)    I spent 90 minutes in the professional and overall care of this patient.    Plan/Recommendations    Depression    - Discontinue Wellbutrin 100 mg PO Daily, Contraindicated due to history of seizures.   - Start Remeron 15 mg PO at bedtime   2.   Anxiety             - Continue Hydroxyzine 25 mg PO PRN every 8 hours  3.   Insomnia   - Sleep hygiene education provided   - Start Melatonin 1 mg PO at bedtime     The patient does not meet the criteria for the inpatient psychiatric treatment. Appreciate Discharge Dispo or next steps from the Care Coordinator. Thank you for allowing us to participate in the care of this patient. We will continue to follow.  Medication Consent    Medication Consent: risks, benefits, side effects reviewed for all ordered meds and patient expressed understanding and consent obtained    Parts of this chart have been completed using voice recognition software.  Please excuse any errors of transcription.  Despite the medical decision making time stamp, my medical decision making has taken place during the patient's entire visit.  Thought process and reason for plan has been formulated from the time that I saw the patient until the time of disposition and is not specific to one specific moment during their visit and furthermore the medical decision making encompasses the entire chart and not only that represented in this note.    Elva Mireles, ORI-CNP

## 2024-05-02 NOTE — CARE PLAN
Problem: Skin  Goal: Decreased wound size/increased tissue granulation at next dressing change  Recent Flowsheet Documentation  Taken 5/2/2024 0033 by Karlo Hurley RN  Decreased wound size/increased tissue granulation at next dressing change: Protective dressings over bony prominences  Goal: Participates in plan/prevention/treatment measures  Recent Flowsheet Documentation  Taken 5/2/2024 0033 by Karlo Hurley RN  Participates in plan/prevention/treatment measures: Elevate heels  Goal: Prevent/manage excess moisture  Recent Flowsheet Documentation  Taken 5/2/2024 0033 by Karlo Hurley RN  Prevent/manage excess moisture:   Moisturize dry skin   Monitor for/manage infection if present  Goal: Prevent/minimize sheer/friction injuries  Recent Flowsheet Documentation  Taken 5/2/2024 0033 by Karlo Hurley RN  Prevent/minimize sheer/friction injuries:   Complete micro-shifts as needed if patient unable. Adjust patient position to relieve pressure points, not a full turn   Use pull sheet   HOB 30 degrees or less  Goal: Promote/optimize nutrition  Recent Flowsheet Documentation  Taken 5/2/2024 0033 by Karlo Hurley RN  Promote/optimize nutrition:   Offer water/supplements/favorite foods   Monitor/record intake including meals  Goal: Promote skin healing  Recent Flowsheet Documentation  Taken 5/2/2024 0033 by Karlo Hurley RN  Promote skin healing: Protective dressings over bony prominences     Problem: Skin  Goal: Decreased wound size/increased tissue granulation at next dressing change  Outcome: Progressing  Flowsheets (Taken 5/2/2024 0033)  Decreased wound size/increased tissue granulation at next dressing change: Protective dressings over bony prominences  Goal: Participates in plan/prevention/treatment measures  Outcome: Progressing  Flowsheets (Taken 5/2/2024 0033)  Participates in plan/prevention/treatment measures: Elevate heels  Goal: Prevent/manage excess moisture  Outcome: Progressing  Flowsheets (Taken 5/2/2024  0033)  Prevent/manage excess moisture:   Moisturize dry skin   Monitor for/manage infection if present  Goal: Prevent/minimize sheer/friction injuries  Outcome: Progressing  Flowsheets (Taken 5/2/2024 0033)  Prevent/minimize sheer/friction injuries:   Complete micro-shifts as needed if patient unable. Adjust patient position to relieve pressure points, not a full turn   Use pull sheet   HOB 30 degrees or less  Goal: Promote/optimize nutrition  Outcome: Progressing  Flowsheets (Taken 5/2/2024 0033)  Promote/optimize nutrition:   Offer water/supplements/favorite foods   Monitor/record intake including meals  Goal: Promote skin healing  Outcome: Progressing  Flowsheets (Taken 5/2/2024 0033)  Promote skin healing: Protective dressings over bony prominences     Problem: Pain  Goal: Takes deep breaths with improved pain control throughout the shift  Outcome: Progressing  Goal: Turns in bed with improved pain control throughout the shift  Outcome: Progressing  Goal: Walks with improved pain control throughout the shift  Outcome: Progressing  Goal: Performs ADL's with improved pain control throughout shift  Outcome: Progressing  Goal: Participates in PT with improved pain control throughout the shift  Outcome: Progressing  Goal: Free from opioid side effects throughout the shift  Outcome: Progressing  Goal: Free from acute confusion related to pain meds throughout the shift  Outcome: Progressing     Problem: Fall/Injury  Goal: Not fall by end of shift  Outcome: Progressing  Goal: Be free from injury by end of the shift  Outcome: Progressing  Goal: Verbalize understanding of personal risk factors for fall in the hospital  Outcome: Progressing  Goal: Verbalize understanding of risk factor reduction measures to prevent injury from fall in the home  Outcome: Progressing  Goal: Pace activities to prevent fatigue by end of the shift  Outcome: Progressing     Problem: Pain - Adult  Goal: Verbalizes/displays adequate comfort level  or baseline comfort level  Outcome: Progressing     Problem: Safety - Adult  Goal: Free from fall injury  Outcome: Progressing     Problem: Discharge Planning  Goal: Discharge to home or other facility with appropriate resources  Outcome: Progressing     Problem: Chronic Conditions and Co-morbidities  Goal: Patient's chronic conditions and co-morbidity symptoms are monitored and maintained or improved  Outcome: Progressing   The patient's goals for the shift include  manage pain    The clinical goals for the shift include Remain hemidynamically stable    Over the shift, the patient did not make progress toward the following goals. Barriers to progression include. Recommendations to address these barriers include.

## 2024-05-02 NOTE — PROGRESS NOTES
Vancomycin Dosing by Pharmacy- FOLLOW-UP (HEMODIALYSIS)    Batsheva Page is a 48 y.o. year old female who Pharmacy has been consulted for vancomycin dosing for Vancomycin Indications: Bacteremia (MRSA), infected dialysis catheter. Based on the patient's indication and renal status this patient will be dosed based on a pre-HD level of 20-25 mcg/mL.     Patient is currently on hemodialysis.    Vancomycin maintenance dose: 750 mg after each dialysis session Tues/Thurs/Sat     Vancomycin pre-HD level 25.3    Lab Results   Component Value Date    VANCORANDOM 25.3 (H) 05/02/2024    VANCOTROUGH 27.9 (HH) 09/26/2022       Visit Vitals  /69   Pulse 92   Temp 37.2 °C (99 °F) (Temporal)   Resp 17        Lab Results   Component Value Date    CREATININE 5.30 (H) 05/02/2024    CREATININE 3.00 (H) 05/01/2024    CREATININE 5.50 (H) 04/30/2024    CREATININE 4.00 (H) 04/29/2024       I/O last 3 completed shifts:  In: 200 (2.9 mL/kg) [I.V.:200 (2.9 mL/kg)]  Out: 0 (0 mL/kg)   Weight: 67.8 kg     Assessment/Plan     Within goal pre-HD level  Pre-HD level will be obtained on 5/4 at AM labs. May be obtained sooner if clinically indicated. If level is above goal, random level with AM labs the next morning will be obtained.   Will continue to monitor renal function daily while on vancomycin and order serum creatinine at least every 48 hours if not already ordered.  Follow for continued vancomycin needs, clinical response, and signs/symptoms of toxicity.     Radha Fallon, PharmD

## 2024-05-02 NOTE — CARE PLAN
Problem: Pain - Adult  Goal: Verbalizes/displays adequate comfort level or baseline comfort level  Outcome: Progressing     Problem: Safety - Adult  Goal: Free from fall injury  Outcome: Progressing     Problem: Discharge Planning  Goal: Discharge to home or other facility with appropriate resources  Outcome: Progressing     Problem: Chronic Conditions and Co-morbidities  Goal: Patient's chronic conditions and co-morbidity symptoms are monitored and maintained or improved  Outcome: Progressing     Problem: Skin  Goal: Decreased wound size/increased tissue granulation at next dressing change  Outcome: Progressing  Goal: Participates in plan/prevention/treatment measures  Outcome: Progressing  Goal: Prevent/manage excess moisture  Outcome: Progressing  Goal: Prevent/minimize sheer/friction injuries  Outcome: Progressing  Goal: Promote/optimize nutrition  Outcome: Progressing  Goal: Promote skin healing  Outcome: Progressing     Problem: Pain  Goal: Takes deep breaths with improved pain control throughout the shift  Outcome: Progressing  Goal: Turns in bed with improved pain control throughout the shift  Outcome: Progressing  Goal: Walks with improved pain control throughout the shift  Outcome: Progressing  Goal: Performs ADL's with improved pain control throughout shift  Outcome: Progressing  Goal: Participates in PT with improved pain control throughout the shift  Outcome: Progressing  Goal: Free from opioid side effects throughout the shift  Outcome: Progressing  Goal: Free from acute confusion related to pain meds throughout the shift  Outcome: Progressing     Problem: Fall/Injury  Goal: Not fall by end of shift  Outcome: Progressing  Goal: Be free from injury by end of the shift  Outcome: Progressing  Goal: Verbalize understanding of personal risk factors for fall in the hospital  Outcome: Progressing  Goal: Verbalize understanding of risk factor reduction measures to prevent injury from fall in the  home  Outcome: Progressing  Goal: Pace activities to prevent fatigue by end of the shift  Outcome: Progressing           The patient's goals for the shift include      The clinical goals for the shift include remain hemodynamically stable

## 2024-05-02 NOTE — PRE-PROCEDURE NOTE
Report from Sending RN:    Report From: RN Karlo Hurley/FARAZ Srivastava  Recent Surgery of Procedure: Yes, TDC to be removed post dialysis  Baseline Level of Consciousness (LOC): A&Ox4  Oxygen Use: No  Type: n/a  Diabetic: No  Last BP Med Given Day of Dialysis: see EMAR  Last Pain Med Given: see EMAR  Lab Tests to be Obtained with Dialysis: No  Blood Transfusion to be Given During Dialysis: No  Available IV Access: Yes  Medications to be Administered During Dialysis: No  Continuous IV Infusion Running: No  Restraints on Currently or in the Last 24 Hours: No  Hand-Off Communication: stable and ready for HD in dialysis room  Dialysis Catheter Dressing: will assess when patient arrives  Last Dressing Change: will assess when patient arrives

## 2024-05-02 NOTE — PROGRESS NOTES
"INFECTIOUS DISEASES PROGRESS NOTE    Consulted / following patient for:  MRSA sepsis, presumed dialysis catheter infection    Subjective   Interval History:   Agreed with LAURA and plan for hemodialysis through tunneled catheter after the current one is replaced and sterility of bloodstream assured.    Discussed in detail 5/1 with Dr Lott and Dr LIBIA Pedersen, plan for removal of catheter after dialysis today, with guidewire left in place.    Objective   PHYSICAL EXAMINATION  Vital signs:  Visit Vitals  /90   Pulse 83   Temp 36.1 °C (97 °F) (Temporal)   Resp 20   General: Not toxic.  Seen and examined during dialysis  HEENT:  No scleral icterus or conjunctival suffusion, oral mucosa moist  Chest: Tunneled dialysis catheter in the left subclavian position without erythema, suppuration, or tenderness.  Lungs clear to auscultation  Heart:  S1, S2 crisp.  Prominent systolic ejection murmur both at the apex and the base.  No diastolic murmur appreciated  Abdomen:  Soft, nontender. No palpable organs or masses  Extremities:  No cords, phlebitis, cellulitis.  Triple-lumen dialysis catheter removed from the right groin  Neurologic:  Alert.  Grossly non-focal.   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 Lott will remove this catheter later today, leave a guidewire in place, and replace when sterility of bloodstream is assured.  Patient says she will " consider peritoneal dialysis, but remains very reluctant because of remote adverse experience.      PLANS:  -   Continue vancomycin (pharmacy dosing) and rifampin  -   Await further incubation of blood cultures done during dialysis 4/30, 1 from the right groin and one from the chest catheter  -    Dr. Lott to remove dialysis catheter today, leaving guidewire in place.   -    If blood cultures from 4/30 remain sterile 5/3, OK for placement of new tunneled catheter    Reviewed with Dr LIBIA Soto MD  [facility]  Office:  [phone]

## 2024-05-02 NOTE — POST-PROCEDURE NOTE
Report to Receiving RN:    Report To: FARAZ Srivastava  Time Report Called: 1136  Hand-Off Communication: Tolerated 3.0hr dialysis treatment without difficulty. BP rising at end of treatment, 181/76  HR 83. 2L removed. Catheter ports heparinized per MAR, 2.1cc each port, capped securely.  Complications During Treatment: No  Ultrafiltration Treatment: No  Medications Administered During Dialysis: Yes, by floor nurse Paty CHUN  Blood Products Administered During Dialysis: No  Labs Sent During Dialysis: No  Heparin Drip Rate Changes: N/A  Dialysis Catheter Dressing: dry and intact  Last Dressing Change: 4/30/24    Electronic Signatures:  Heidy Brown

## 2024-05-02 NOTE — SIGNIFICANT EVENT
Called by the nurse: Patient has frequent episodes of runs of narrow complex tachycardia with heart rate up to 118 terminating spontaneously.  During exam, patient complains of palpitations.  Monitor demonstrates irregular narrow complex rhythm, either A-fib with RVR versus multifocal atrial extrasystole with occasional ventricular extrasystole.  Patient is alert, her blood pressure is stable.  Heart rate sustained above 125.  Ordered 1 dose of 10 mg of IV metoprolol.  Patient tolerated medicine well.  She remains in regular rate and more likely resembling sinus rhythm with frequent multifocal PACs with heart rate mostly between 101 10.

## 2024-05-03 ENCOUNTER — LAB REQUISITION (OUTPATIENT)
Dept: LAB | Facility: HOSPITAL | Age: 49
DRG: 314 | End: 2024-05-03
Payer: MEDICARE

## 2024-05-03 LAB
ANION GAP SERPL CALC-SCNC: 17 MMOL/L
BASOPHILS # BLD AUTO: 0.03 X10*3/UL (ref 0–0.1)
BASOPHILS NFR BLD AUTO: 0.5 %
BUN SERPL-MCNC: 13 MG/DL (ref 8–25)
CALCIUM SERPL-MCNC: 8.4 MG/DL (ref 8.5–10.4)
CHLORIDE SERPL-SCNC: 98 MMOL/L (ref 97–107)
CO2 SERPL-SCNC: 23 MMOL/L (ref 24–31)
CREAT SERPL-MCNC: 4.2 MG/DL (ref 0.4–1.6)
EGFRCR SERPLBLD CKD-EPI 2021: 12 ML/MIN/1.73M*2
EOSINOPHIL # BLD AUTO: 0.24 X10*3/UL (ref 0–0.7)
EOSINOPHIL NFR BLD AUTO: 3.6 %
ERYTHROCYTE [DISTWIDTH] IN BLOOD BY AUTOMATED COUNT: 20 % (ref 11.5–14.5)
GLUCOSE SERPL-MCNC: 102 MG/DL (ref 65–99)
HCT VFR BLD AUTO: 35.5 % (ref 36–46)
HGB BLD-MCNC: 10.3 G/DL (ref 12–16)
IMM GRANULOCYTES # BLD AUTO: 0.12 X10*3/UL (ref 0–0.7)
IMM GRANULOCYTES NFR BLD AUTO: 1.8 % (ref 0–0.9)
LYMPHOCYTES # BLD AUTO: 0.9 X10*3/UL (ref 1.2–4.8)
LYMPHOCYTES NFR BLD AUTO: 13.6 %
MAGNESIUM SERPL-MCNC: 2.1 MG/DL (ref 1.6–3.1)
MCH RBC QN AUTO: 25.4 PG (ref 26–34)
MCHC RBC AUTO-ENTMCNC: 29 G/DL (ref 32–36)
MCV RBC AUTO: 88 FL (ref 80–100)
MONOCYTES # BLD AUTO: 0.55 X10*3/UL (ref 0.1–1)
MONOCYTES NFR BLD AUTO: 8.3 %
NEUTROPHILS # BLD AUTO: 4.8 X10*3/UL (ref 1.2–7.7)
NEUTROPHILS NFR BLD AUTO: 72.2 %
NRBC BLD-RTO: 0 /100 WBCS (ref 0–0)
PHOSPHATE SERPL-MCNC: 2.1 MG/DL (ref 2.5–4.5)
PLATELET # BLD AUTO: 231 X10*3/UL (ref 150–450)
POTASSIUM SERPL-SCNC: 3.6 MMOL/L (ref 3.4–5.1)
RBC # BLD AUTO: 4.05 X10*6/UL (ref 4–5.2)
SARS-COV-2 RNA RESP QL NAA+PROBE: NOT DETECTED
SODIUM SERPL-SCNC: 138 MMOL/L (ref 133–145)
WBC # BLD AUTO: 6.6 X10*3/UL (ref 4.4–11.3)

## 2024-05-03 PROCEDURE — 99232 SBSQ HOSP IP/OBS MODERATE 35: CPT | Performed by: NURSE PRACTITIONER

## 2024-05-03 PROCEDURE — 36415 COLL VENOUS BLD VENIPUNCTURE: CPT

## 2024-05-03 PROCEDURE — 99232 SBSQ HOSP IP/OBS MODERATE 35: CPT

## 2024-05-03 PROCEDURE — 82374 ASSAY BLOOD CARBON DIOXIDE: CPT

## 2024-05-03 PROCEDURE — 84100 ASSAY OF PHOSPHORUS: CPT

## 2024-05-03 PROCEDURE — 2060000001 HC INTERMEDIATE ICU ROOM DAILY

## 2024-05-03 PROCEDURE — 2500000001 HC RX 250 WO HCPCS SELF ADMINISTERED DRUGS (ALT 637 FOR MEDICARE OP)

## 2024-05-03 PROCEDURE — 87635 SARS-COV-2 COVID-19 AMP PRB: CPT | Performed by: HOSPITALIST

## 2024-05-03 PROCEDURE — 6350000001 HC RX 635 EPOETIN >10,000 UNITS

## 2024-05-03 PROCEDURE — 2500000001 HC RX 250 WO HCPCS SELF ADMINISTERED DRUGS (ALT 637 FOR MEDICARE OP): Performed by: HOSPITALIST

## 2024-05-03 PROCEDURE — 83735 ASSAY OF MAGNESIUM: CPT

## 2024-05-03 PROCEDURE — 85025 COMPLETE CBC W/AUTO DIFF WBC: CPT

## 2024-05-03 PROCEDURE — 2500000004 HC RX 250 GENERAL PHARMACY W/ HCPCS (ALT 636 FOR OP/ED)

## 2024-05-03 PROCEDURE — 2500000004 HC RX 250 GENERAL PHARMACY W/ HCPCS (ALT 636 FOR OP/ED): Performed by: HOSPITALIST

## 2024-05-03 RX ORDER — DIPHENHYDRAMINE HYDROCHLORIDE 50 MG/ML
25 INJECTION INTRAMUSCULAR; INTRAVENOUS EVERY 4 HOURS PRN
Status: DISCONTINUED | OUTPATIENT
Start: 2024-05-03 | End: 2024-05-08 | Stop reason: HOSPADM

## 2024-05-03 RX ADMIN — EPOETIN ALFA-EPBX 6500 UNITS: 20000 INJECTION, SOLUTION INTRAVENOUS; SUBCUTANEOUS at 15:36

## 2024-05-03 RX ADMIN — RIFAMPIN 300 MG: 300 CAPSULE ORAL at 21:36

## 2024-05-03 RX ADMIN — DIPHENHYDRAMINE HYDROCHLORIDE 25 MG: 50 INJECTION, SOLUTION INTRAMUSCULAR; INTRAVENOUS at 19:46

## 2024-05-03 RX ADMIN — ACETAMINOPHEN 650 MG: 325 TABLET ORAL at 08:54

## 2024-05-03 RX ADMIN — HYDRALAZINE HYDROCHLORIDE 50 MG: 50 TABLET ORAL at 21:36

## 2024-05-03 RX ADMIN — DIPHENHYDRAMINE HYDROCHLORIDE 25 MG: 50 INJECTION, SOLUTION INTRAMUSCULAR; INTRAVENOUS at 15:18

## 2024-05-03 RX ADMIN — RIFAMPIN 300 MG: 300 CAPSULE ORAL at 06:15

## 2024-05-03 RX ADMIN — HYDRALAZINE HYDROCHLORIDE 50 MG: 50 TABLET ORAL at 15:36

## 2024-05-03 RX ADMIN — CLONIDINE HYDROCHLORIDE 0.1 MG: 0.1 TABLET ORAL at 15:36

## 2024-05-03 RX ADMIN — CLONIDINE HYDROCHLORIDE 0.1 MG: 0.1 TABLET ORAL at 21:36

## 2024-05-03 RX ADMIN — HYDROMORPHONE HYDROCHLORIDE 0.2 MG: 1 INJECTION, SOLUTION INTRAMUSCULAR; INTRAVENOUS; SUBCUTANEOUS at 15:21

## 2024-05-03 RX ADMIN — HYDROMORPHONE HYDROCHLORIDE 0.2 MG: 1 INJECTION, SOLUTION INTRAMUSCULAR; INTRAVENOUS; SUBCUTANEOUS at 02:20

## 2024-05-03 RX ADMIN — HYDROMORPHONE HYDROCHLORIDE 0.2 MG: 1 INJECTION, SOLUTION INTRAMUSCULAR; INTRAVENOUS; SUBCUTANEOUS at 06:15

## 2024-05-03 RX ADMIN — CALCITRIOL CAPSULES 0.25 MCG 0.5 MCG: 0.25 CAPSULE ORAL at 08:54

## 2024-05-03 RX ADMIN — HYDROMORPHONE HYDROCHLORIDE 0.2 MG: 1 INJECTION, SOLUTION INTRAMUSCULAR; INTRAVENOUS; SUBCUTANEOUS at 10:18

## 2024-05-03 RX ADMIN — DIPHENHYDRAMINE HYDROCHLORIDE 25 MG: 50 INJECTION, SOLUTION INTRAMUSCULAR; INTRAVENOUS at 10:22

## 2024-05-03 RX ADMIN — ATORVASTATIN CALCIUM 40 MG: 40 TABLET, FILM COATED ORAL at 21:36

## 2024-05-03 RX ADMIN — RIFAMPIN 300 MG: 300 CAPSULE ORAL at 15:36

## 2024-05-03 RX ADMIN — METOPROLOL TARTRATE 25 MG: 25 TABLET, FILM COATED ORAL at 21:36

## 2024-05-03 RX ADMIN — ASPIRIN 81 MG: 81 TABLET, COATED ORAL at 08:54

## 2024-05-03 RX ADMIN — PREGABALIN 50 MG: 50 CAPSULE ORAL at 21:36

## 2024-05-03 RX ADMIN — DIPHENHYDRAMINE HYDROCHLORIDE 25 MG: 50 INJECTION, SOLUTION INTRAMUSCULAR; INTRAVENOUS at 23:48

## 2024-05-03 RX ADMIN — HYDRALAZINE HYDROCHLORIDE 50 MG: 50 TABLET ORAL at 08:54

## 2024-05-03 RX ADMIN — MIRTAZAPINE 15 MG: 15 TABLET, FILM COATED ORAL at 21:36

## 2024-05-03 RX ADMIN — METOPROLOL TARTRATE 25 MG: 25 TABLET, FILM COATED ORAL at 08:54

## 2024-05-03 RX ADMIN — HYDROMORPHONE HYDROCHLORIDE 0.2 MG: 1 INJECTION, SOLUTION INTRAMUSCULAR; INTRAVENOUS; SUBCUTANEOUS at 23:48

## 2024-05-03 RX ADMIN — HYDROMORPHONE HYDROCHLORIDE 0.2 MG: 1 INJECTION, SOLUTION INTRAMUSCULAR; INTRAVENOUS; SUBCUTANEOUS at 19:46

## 2024-05-03 RX ADMIN — PREGABALIN 50 MG: 50 CAPSULE ORAL at 08:54

## 2024-05-03 RX ADMIN — DIPHENHYDRAMINE HYDROCHLORIDE 25 MG: 50 INJECTION, SOLUTION INTRAMUSCULAR; INTRAVENOUS at 06:28

## 2024-05-03 RX ADMIN — CLONIDINE HYDROCHLORIDE 0.1 MG: 0.1 TABLET ORAL at 06:15

## 2024-05-03 ASSESSMENT — COGNITIVE AND FUNCTIONAL STATUS - GENERAL
DAILY ACTIVITIY SCORE: 24
WALKING IN HOSPITAL ROOM: A LITTLE
CLIMB 3 TO 5 STEPS WITH RAILING: A LITTLE
MOVING FROM LYING ON BACK TO SITTING ON SIDE OF FLAT BED WITH BEDRAILS: TOTAL
STANDING UP FROM CHAIR USING ARMS: A LITTLE
MOBILITY SCORE: 21

## 2024-05-03 ASSESSMENT — PAIN DESCRIPTION - LOCATION
LOCATION: GENERALIZED
LOCATION: GENERALIZED

## 2024-05-03 ASSESSMENT — PAIN SCALES - GENERAL
PAINLEVEL_OUTOF10: 7
PAINLEVEL_OUTOF10: 10 - WORST POSSIBLE PAIN
PAINLEVEL_OUTOF10: 10 - WORST POSSIBLE PAIN
PAINLEVEL_OUTOF10: 5 - MODERATE PAIN
PAINLEVEL_OUTOF10: 7
PAINLEVEL_OUTOF10: 10 - WORST POSSIBLE PAIN
PAINLEVEL_OUTOF10: 7
PAINLEVEL_OUTOF10: 10 - WORST POSSIBLE PAIN
PAINLEVEL_OUTOF10: 0 - NO PAIN
PAINLEVEL_OUTOF10: 7
PAINLEVEL_OUTOF10: 10 - WORST POSSIBLE PAIN
PAINLEVEL_OUTOF10: 10 - WORST POSSIBLE PAIN

## 2024-05-03 ASSESSMENT — PAIN DESCRIPTION - DESCRIPTORS
DESCRIPTORS: ACHING

## 2024-05-03 ASSESSMENT — PAIN - FUNCTIONAL ASSESSMENT
PAIN_FUNCTIONAL_ASSESSMENT: 0-10
PAIN_FUNCTIONAL_ASSESSMENT: CPOT (CRITICAL CARE PAIN OBSERVATION TOOL)
PAIN_FUNCTIONAL_ASSESSMENT: 0-10

## 2024-05-03 ASSESSMENT — PAIN SCALES - WONG BAKER: WONGBAKER_NUMERICALRESPONSE: NO HURT

## 2024-05-03 ASSESSMENT — PAIN DESCRIPTION - ORIENTATION
ORIENTATION: RIGHT;LEFT
ORIENTATION: RIGHT;LEFT

## 2024-05-03 NOTE — PROGRESS NOTES
"Batsheva Page is a 48 y.o. female on day 5 of admission presenting with Sepsis, due to unspecified organism, unspecified whether acute organ dysfunction present (Multi).      Subjective     During today's interaction with the patient, prior to meeting with them, we reviewed their charts and discussed the case with FARAZ Moody, who was also assigned to the same patient yesterday. No issues were reported today, and the patient's behavior remained appropriate. There was a slight change in their overall mood, as they stated feeling less depressed or anxious today.    We discussed with the patient the medication change and offered them the opportunity to ask questions or voice any concerns. The patient mentioned that the Remeron medication had been beneficial, and they have already noticed an improvement in their appetite. They also reported having a good night's sleep. The patient denied experiencing any auditory or visual hallucinations, as well as any suicidal or homicidal thoughts. They mentioned feeling slightly better today compared to when we met with them yesterday. We assured the patient that they could call us or let RN know if they needed anything further from us.    We appreciate being involved in the patient's care and will continue to follow up with them when our service returns on Monday. If the patient remains in the hospital in the following days next week, we may consider increasing the dose of Remeron. However, we will advise to avoid restarting Wellbutrin, as it could potentially exacerbate their seizures. The patient expressed agreement with the current plan.       Objective     Last Recorded Vitals  Blood pressure (!) 101/30, pulse 87, temperature 36.4 °C (97.5 °F), temperature source Temporal, resp. rate 19, height 1.727 m (5' 8\"), weight 66.2 kg (145 lb 15.1 oz), SpO2 97%.    Review of Systems  Constitutional:  Positive for activity change and fatigue.   Gastrointestinal:  Positive for " "nausea.   Musculoskeletal:  Positive for arthralgias and myalgias.   Neurological:  Positive for seizures and weakness.   Psychiatric/Behavioral:  Positive for dysphoric mood and sleep disturbance. Negative for agitation, behavioral problems, confusion, decreased concentration, hallucinations, self-injury and suicidal ideas. The patient is nervous/anxious. The patient is not hyperactive.      Psychiatric ROS - Adult  Anxiety: decreased anxiety  Depression: energy   Delirium: negative  Psychosis: negative  Suzanne: negative  Safety Issues: none  Psychiatric ROS Comment: As noted      Mental Status Exam  General: alert  and awake in hospital attire  Appearance: Well groomed, appropriate eye contact.  Attitude/Behavior:Cooperative, conversant, engaged, and with good eye contact.  Motor Activity: No psychomotor agitation or retardation, no tremor or other abnormal movements.  Speech: normal rate, tone and rhythm  Mood: \"better  Affect: normal and mood-congruent  Thought Process: linear, goal directed and circumstantial  Thought Content: Within normal limits  Thought Perception: No perceptual abnormalities noted  Cognition: Cognitively intact to conversational testing with respect to attention, orientation, fund of knowledge, recent and remote memory, and language.  Insight: intact  Judgement: Intact     Psychiatric Risk Assessment  Violence Risk Assessment: none  Acute Risk of Harm to Others is Considered: low   Suicide Risk Assessment: , chronic medical illness, and chronic pain  Protective Factors against Suicide: adherence to  treatment, marriage/partnership, moral objections to suicide, positive family relationships, and sense of responsibility toward family  Acute Risk of Harm to Self is Considered: low    Current Medications    Scheduled medications  aspirin, 81 mg, oral, Daily  atorvastatin, 40 mg, oral, Nightly  calcitriol, 0.5 mcg, oral, Daily  cloNIDine, 0.1 mg, oral, q8h KIMBERLY  epoetin brandy-epbx, 6,500 " Units, subcutaneous, Once per day on Monday Wednesday Friday  ergocalciferol, 1,250 mcg, oral, Every Sunday  heparin (porcine), 5,000 Units, subcutaneous, q8h KIMBERLY  heparin, 1,000 Units, intra-catheter, After Dialysis  heparin, 1,000 Units, intra-catheter, After Dialysis  heparin, 1,000 Units, intra-catheter, After Dialysis  heparin, 1,000 Units, intra-catheter, After Dialysis  hydrALAZINE, 50 mg, oral, TID  levETIRAcetam, 500 mg, oral, Nightly  melatonin, 1 mg, oral, Nightly  metoprolol tartrate, 25 mg, oral, BID  mirtazapine, 15 mg, oral, Nightly  pregabalin, 50 mg, oral, BID  rifAMPin, 300 mg, oral, q8h  vancomycin-diluent combo no.1, 750 mg, intravenous, Once per day on Tuesday Thursday Saturday      Continuous medications  oxygen, , Last Rate: 1 L/min (05/02/24 1521)      PRN medications  PRN medications: acetaminophen, dextrose, dextrose, diphenhydrAMINE, diphenhydrAMINE, fentaNYL PF, glucagon, glucagon, HYDROmorphone, hydrOXYzine pamoate, loratadine, midazolam, oxyCODONE-acetaminophen, oxygen, polyethylene glycol, pramoxine, promethazine, vancomycin       Medication efficacy: Yes  Patient reporting any side effects? No  Any observed side effects? No      Relevant Results  Results for orders placed or performed during the hospital encounter of 04/27/24 (from the past 24 hour(s))   CBC and Auto Differential   Result Value Ref Range    WBC 6.6 4.4 - 11.3 x10*3/uL    nRBC 0.0 0.0 - 0.0 /100 WBCs    RBC 4.05 4.00 - 5.20 x10*6/uL    Hemoglobin 10.3 (L) 12.0 - 16.0 g/dL    Hematocrit 35.5 (L) 36.0 - 46.0 %    MCV 88 80 - 100 fL    MCH 25.4 (L) 26.0 - 34.0 pg    MCHC 29.0 (L) 32.0 - 36.0 g/dL    RDW 20.0 (H) 11.5 - 14.5 %    Platelets 231 150 - 450 x10*3/uL    Neutrophils % 72.2 40.0 - 80.0 %    Immature Granulocytes %, Automated 1.8 (H) 0.0 - 0.9 %    Lymphocytes % 13.6 13.0 - 44.0 %    Monocytes % 8.3 2.0 - 10.0 %    Eosinophils % 3.6 0.0 - 6.0 %    Basophils % 0.5 0.0 - 2.0 %    Neutrophils Absolute 4.80 1.20 -  7.70 x10*3/uL    Immature Granulocytes Absolute, Automated 0.12 0.00 - 0.70 x10*3/uL    Lymphocytes Absolute 0.90 (L) 1.20 - 4.80 x10*3/uL    Monocytes Absolute 0.55 0.10 - 1.00 x10*3/uL    Eosinophils Absolute 0.24 0.00 - 0.70 x10*3/uL    Basophils Absolute 0.03 0.00 - 0.10 x10*3/uL   Basic Metabolic Panel   Result Value Ref Range    Glucose 102 (H) 65 - 99 mg/dL    Sodium 138 133 - 145 mmol/L    Potassium 3.6 3.4 - 5.1 mmol/L    Chloride 98 97 - 107 mmol/L    Bicarbonate 23 (L) 24 - 31 mmol/L    Urea Nitrogen 13 8 - 25 mg/dL    Creatinine 4.20 (H) 0.40 - 1.60 mg/dL    eGFR 12 (L) >60 mL/min/1.73m*2    Calcium 8.4 (L) 8.5 - 10.4 mg/dL    Anion Gap 17 <=19 mmol/L   Magnesium   Result Value Ref Range    Magnesium 2.10 1.60 - 3.10 mg/dL   Phosphorus   Result Value Ref Range    Phosphorus 2.1 (L) 2.5 - 4.5 mg/dL   Sars-CoV-2 PCR   Result Value Ref Range    Coronavirus 2019, PCR Not Detected Not Detected               Reviewed    Assessment/Plan   Principal Problem:    Sepsis, due to unspecified organism, unspecified whether acute organ dysfunction present (Multi)    Plan/Recommendations     Depression              - Discontinue Wellbutrin 100 mg PO Daily, Contraindicated due to history of seizures.   - Continue  Remeron 15 mg PO at bedtime   2.   Anxiety             - Continue Hydroxyzine 25 mg PO PRN every 8 hours  3.   Insomnia             - Continue Melatonin 1 mg PO at bedtime      The patient does not meet the criteria for the inpatient psychiatric treatment. Appreciate Discharge Dispo or next steps from the Care Coordinator. Thank you for allowing us to participate in the care of this patient. We will continue to follow.          I spent 35 minutes in the professional and overall care of this patient.    Parts of this chart have been completed using voice recognition software.  Please excuse any errors of transcription.  Despite the medical decision making time stamp, my medical decision making has taken place  during the patient's entire visit.  Thought process and reason for plan has been formulated from the time that I saw the patient until the time of disposition and is not specific to one specific moment during their visit and furthermore the medical decision making encompasses the entire chart and not only that represented in this note.     Elva Mireles, APRN-CNP

## 2024-05-03 NOTE — PROGRESS NOTES
"INFECTIOUS DISEASES PROGRESS NOTE    Consulted / following patient for:  MRSA sepsis, presumed dialysis catheter infection    Subjective   Interval History:   Reported overnight development of cough rhinorrhea and sore throat.  Not dyspneic.  Being tested for influenza and COVID-19    Objective   PHYSICAL EXAMINATION  Vital signs:  Visit Vitals  /79   Pulse 100   Temp 37.4 °C (99.3 °F) (Temporal)   Resp 23   General: Not toxic.   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 Lott removed the tunneled catheter on 5/2 and left a guidewire in place.  Blood cultures remain sterile and she is it is okay for placement of a new tunneled catheter over the guidewire.  Patient says she will consider peritoneal dialysis, but remains very reluctant because of remote adverse experience.      Cough and nasal congestion  Influenza and COVID-19 testing ordered    PLANS:  -   Continue vancomycin (pharmacy dosing) and rifampin  -   From ID viewpoint, okay for placement of a new tunneled catheter for dialysis  -   Await testing for influenza and COVID-19    Adama Soto MD  [facility]  Office:  [phone]  "

## 2024-05-03 NOTE — CARE PLAN
Problem: Skin  Goal: Decreased wound size/increased tissue granulation at next dressing change  Recent Flowsheet Documentation  Taken 5/2/2024 2031 by Annamaria Lynne RN  Decreased wound size/increased tissue granulation at next dressing change:   Promote sleep for wound healing   Utilize specialty bed per algorithm   Protective dressings over bony prominences  Goal: Participates in plan/prevention/treatment measures  Recent Flowsheet Documentation  Taken 5/2/2024 2031 by Annamaria Lynne RN  Participates in plan/prevention/treatment measures:   Discuss with provider PT/OT consult   Elevate heels   Increase activity/out of bed for meals  Goal: Prevent/manage excess moisture  Recent Flowsheet Documentation  Taken 5/2/2024 2031 by Annamaria Lynne RN  Prevent/manage excess moisture:   Cleanse incontinence/protect with barrier cream   Moisturize dry skin   Monitor for/manage infection if present  Goal: Prevent/minimize sheer/friction injuries  Recent Flowsheet Documentation  Taken 5/2/2024 2031 by Annamaria Lynne RN  Prevent/minimize sheer/friction injuries:   Use pull sheet   Turn/reposition every 2 hours/use positioning/transfer devices   HOB 30 degrees or less  Goal: Promote/optimize nutrition  Recent Flowsheet Documentation  Taken 5/2/2024 2031 by Annamaria Lynne RN  Promote/optimize nutrition:   Offer water/supplements/favorite foods   Monitor/record intake including meals  Goal: Promote skin healing  Recent Flowsheet Documentation  Taken 5/2/2024 2031 by Annamaria Lynne RN  Promote skin healing:   Assess skin/pad under line(s)/device(s)   Protective dressings over bony prominences     Problem: Pain - Adult  Goal: Verbalizes/displays adequate comfort level or baseline comfort level  Recent Flowsheet Documentation  Taken 5/2/2024 2031 by Annamaria Lynne RN  Verbalizes/displays adequate comfort level or baseline comfort level:   Assess pain using appropriate pain scale   Administer analgesics based on  type and severity of pain and evaluate response   Implement non-pharmacological measures as appropriate and evaluate response   Consider cultural and social influences on pain and pain management     Problem: Safety - Adult  Goal: Free from fall injury  Recent Flowsheet Documentation  Taken 5/2/2024 2031 by Annamraia Lynne RN  Free from fall injury:   Instruct family/caregiver on patient safety   Based on caregiver fall risk screen, instruct family/caregiver to ask for assistance with transferring infant if caregiver noted to have fall risk factors     Problem: Discharge Planning  Goal: Discharge to home or other facility with appropriate resources  Recent Flowsheet Documentation  Taken 5/2/2024 2031 by Annamaria Lynne RN  Discharge to home or other facility with appropriate resources:   Arrange for needed discharge resources and transportation as appropriate   Identify discharge learning needs (meds, wound care, etc)   Arrange for interpreters to assist at discharge as needed     Problem: Chronic Conditions and Co-morbidities  Goal: Patient's chronic conditions and co-morbidity symptoms are monitored and maintained or improved  Recent Flowsheet Documentation  Taken 5/2/2024 2031 by Annamaria Lynne RN  Care Plan - Patient's Chronic Conditions and Co-Morbidity Symptoms are Monitored and Maintained or Improved:   Monitor and assess patient's chronic conditions and comorbid symptoms for stability, deterioration, or improvement   Collaborate with multidisciplinary team to address chronic and comorbid conditions and prevent exacerbation or deterioration   Update acute care plan with appropriate goals if chronic or comorbid symptoms are exacerbated and prevent overall improvement and discharge     Problem: Pain - Adult  Goal: Verbalizes/displays adequate comfort level or baseline comfort level  Flowsheets (Taken 5/2/2024 2031)  Verbalizes/displays adequate comfort level or baseline comfort level:   Assess pain using  appropriate pain scale   Administer analgesics based on type and severity of pain and evaluate response   Implement non-pharmacological measures as appropriate and evaluate response   Consider cultural and social influences on pain and pain management     Problem: Safety - Adult  Goal: Free from fall injury  Flowsheets (Taken 5/2/2024 2031)  Free from fall injury:   Instruct family/caregiver on patient safety   Based on caregiver fall risk screen, instruct family/caregiver to ask for assistance with transferring infant if caregiver noted to have fall risk factors     Problem: Discharge Planning  Goal: Discharge to home or other facility with appropriate resources  Flowsheets (Taken 5/2/2024 2031)  Discharge to home or other facility with appropriate resources:   Arrange for needed discharge resources and transportation as appropriate   Identify discharge learning needs (meds, wound care, etc)   Arrange for interpreters to assist at discharge as needed     Problem: Chronic Conditions and Co-morbidities  Goal: Patient's chronic conditions and co-morbidity symptoms are monitored and maintained or improved  Flowsheets (Taken 5/2/2024 2031)  Care Plan - Patient's Chronic Conditions and Co-Morbidity Symptoms are Monitored and Maintained or Improved:   Monitor and assess patient's chronic conditions and comorbid symptoms for stability, deterioration, or improvement   Collaborate with multidisciplinary team to address chronic and comorbid conditions and prevent exacerbation or deterioration   Update acute care plan with appropriate goals if chronic or comorbid symptoms are exacerbated and prevent overall improvement and discharge     Problem: Skin  Goal: Decreased wound size/increased tissue granulation at next dressing change  Flowsheets (Taken 5/2/2024 2031)  Decreased wound size/increased tissue granulation at next dressing change:   Promote sleep for wound healing   Utilize specialty bed per algorithm   Protective  dressings over bony prominences  Goal: Participates in plan/prevention/treatment measures  Flowsheets (Taken 5/2/2024 2031)  Participates in plan/prevention/treatment measures:   Discuss with provider PT/OT consult   Elevate heels   Increase activity/out of bed for meals  Goal: Prevent/manage excess moisture  Flowsheets (Taken 5/2/2024 2031)  Prevent/manage excess moisture:   Cleanse incontinence/protect with barrier cream   Moisturize dry skin   Monitor for/manage infection if present  Goal: Prevent/minimize sheer/friction injuries  Flowsheets (Taken 5/2/2024 2031)  Prevent/minimize sheer/friction injuries:   Use pull sheet   Turn/reposition every 2 hours/use positioning/transfer devices   HOB 30 degrees or less  Goal: Promote/optimize nutrition  Flowsheets (Taken 5/2/2024 2031)  Promote/optimize nutrition:   Offer water/supplements/favorite foods   Monitor/record intake including meals  Goal: Promote skin healing  Flowsheets (Taken 5/2/2024 2031)  Promote skin healing:   Assess skin/pad under line(s)/device(s)   Protective dressings over bony prominences   The patient's goals for the shift include      The clinical goals for the shift include remain hemodynamically stable    Over the shift, the patient did not make progress toward the following goals. Barriers to progression include . Recommendations to address these barriers include .

## 2024-05-03 NOTE — PROGRESS NOTES
"Batsheva Page is a 48 y.o. female on day 5 of admission presenting with Sepsis, due to unspecified organism, unspecified whether acute organ dysfunction present (Multi).    Subjective   Patient seen for end-stage kidney disease patient is admitted with MRSA bacteremia he is on antibiotic therapy patient dialysis catheter was removed by interventional radiology yesterday a guidewire was left place for future dialysis catheter today she is complaining of flulike symptoms nasal congestion and cough she is being tested for COVID-19 and influenza       Objective     Physical Exam  Neck:      Vascular: No carotid bruit.   Cardiovascular:      Rate and Rhythm: Normal rate and regular rhythm.      Heart sounds: Murmur heard.      No friction rub. No gallop.   Pulmonary:      Breath sounds: No wheezing, rhonchi or rales.   Chest:      Chest wall: No tenderness.   Abdominal:      General: There is no distension.      Tenderness: There is no abdominal tenderness. There is no guarding or rebound.   Musculoskeletal:         General: No swelling or tenderness.      Cervical back: Neck supple.      Right lower leg: No edema.      Left lower leg: No edema.   Lymphadenopathy:      Cervical: No cervical adenopathy.         Last Recorded Vitals  Blood pressure 113/79, pulse 100, temperature 37.4 °C (99.3 °F), temperature source Temporal, resp. rate 23, height 1.727 m (5' 8\"), weight 66.2 kg (145 lb 15.1 oz), SpO2 92%.    Intake/Output last 3 Shifts:  I/O last 3 completed shifts:  In: 1500 (22.7 mL/kg) [I.V.:800 (12.1 mL/kg); Other:400; IV Piggyback:300]  Out: 4403 (66.5 mL/kg) [Other:4400; Blood:3]  Weight: 66.2 kg     Current Facility-Administered Medications:     acetaminophen (Tylenol) tablet 650 mg, 650 mg, oral, q6h PRN, Brittany Krueger PA-C, 650 mg at 05/03/24 0854    aspirin EC tablet 81 mg, 81 mg, oral, Daily, Brittany Krueger PA-C, 81 mg at 05/03/24 0854    atorvastatin (Lipitor) tablet 40 mg, 40 mg, oral, Nightly, " Brittany Krueger PA-C, 40 mg at 05/02/24 2134    calcitriol (Rocaltrol) capsule 0.5 mcg, 0.5 mcg, oral, Daily, Brittany Krueger PA-C, 0.5 mcg at 05/03/24 0854    cloNIDine (Catapres) tablet 0.1 mg, 0.1 mg, oral, q8h KIMBERLY, Brittany Krueger PA-C, 0.1 mg at 05/03/24 0615    dextrose 50 % injection 12.5 g, 12.5 g, intravenous, q15 min PRN, Brittany Krueger PA-C    dextrose 50 % injection 25 g, 25 g, intravenous, q15 min PRN, Brittany Krueger PA-C    diphenhydrAMINE (BENADryl) injection 25 mg, 25 mg, intravenous, q4h PRN, Kinza Weiner DO    diphenhydrAMINE (BENADryl) injection, , , PRN, Chris Lott MD, 50 mg at 05/02/24 1537    epoetin brandy-epbx (RETACRIT) injection 6,500 Units, 6,500 Units, subcutaneous, Once per day on Monday Wednesday Friday, Britatny Krueger PA-C, 6,500 Units at 05/03/24 0854    ergocalciferol (Vitamin D-2) capsule 1,250 mcg, 1,250 mcg, oral, Every Sunday, Brittany Krueger PA-C    fentaNYL PF (Sublimaze) injection, , , PRN, Chris Lott MD, 50 mcg at 05/02/24 1538    glucagon (Glucagen) injection 1 mg, 1 mg, intramuscular, q15 min PRN, Brittany Krueger PA-C    glucagon (Glucagen) injection 1 mg, 1 mg, intramuscular, q15 min PRN, Brittany Krueger PA-C    heparin (porcine) injection 5,000 Units, 5,000 Units, subcutaneous, q8h KIMBERLY, Brittany Krueger PA-C, 5,000 Units at 04/30/24 0535    heparin 1,000 unit/mL injection 1,000 Units, 1,000 Units, intra-catheter, After Dialysis, Brittany Krueger PA-C, 2,100 Units at 05/02/24 1133    heparin 1,000 unit/mL injection 1,000 Units, 1,000 Units, intra-catheter, After Dialysis, Brittany Krueger PA-C, 2,100 Units at 05/02/24 1133    heparin 1,000 unit/mL injection 1,000 Units, 1,000 Units, intra-catheter, After Dialysis, Kinza Weiner DO    heparin 1,000 unit/mL injection 1,000 Units, 1,000 Units, intra-catheter, After Dialysis, Kinza Weiner DO    hydrALAZINE (Apresoline) tablet 50 mg, 50 mg, oral, TID, Brittany Krueger PA-C, 50 mg at 05/03/24 0891     HYDROmorphone (Dilaudid) injection 0.2 mg, 0.2 mg, intravenous, q4h PRN, Brittany Krueger PA-C, 0.2 mg at 05/03/24 0615    hydrOXYzine pamoate (Vistaril) capsule 25 mg, 25 mg, oral, q8h PRN, Brittany Krueger PA-C    levETIRAcetam (Keppra) tablet 500 mg, 500 mg, oral, Nightly, Brittany Krueger PA-C, 500 mg at 05/02/24 2135    loratadine (Claritin) tablet 10 mg, 10 mg, oral, q48h PRN, Brittany Krueger PA-C    melatonin tablet 1 mg, 1 mg, oral, Nightly, Ariol TafaORI-CNP, 1 mg at 05/02/24 2135    metoprolol tartrate (Lopressor) tablet 25 mg, 25 mg, oral, BID, Kinza Weiner DO, 25 mg at 05/03/24 0854    midazolam (Versed) injection, , , PRN, Chris Lott MD, 2 mg at 05/02/24 1543    mirtazapine (Remeron) tablet 15 mg, 15 mg, oral, Nightly, Ariol TafORI jc-CNP, 15 mg at 05/02/24 2135    oxyCODONE-acetaminophen (Percocet) 5-325 mg per tablet 1 tablet, 1 tablet, oral, q6h PRN, Brittany Krueger PA-C, 1 tablet at 05/02/24 0703    oxygen (O2) therapy, , , Continuous PRN, Chris Lott MD, Last Rate: 60,000 mL/hr at 05/02/24 1521, 1 L/min at 05/02/24 1521    polyethylene glycol (Glycolax, Miralax) packet 17 g, 17 g, oral, Daily PRN, Brittany Krueger PA-C    pramoxine (Sarna Sensitive) 1 % lotion, , Topical, q8h PRN, Brittany Krueger PA-C    pregabalin (Lyrica) capsule 50 mg, 50 mg, oral, BID, Brittany Krueger PA-C, 50 mg at 05/03/24 0854    promethazine (Phenergan) tablet 25 mg, 25 mg, oral, Daily PRN, Brittany Krueger PA-C, 25 mg at 05/02/24 1223    rifAMPin (Rifadin) capsule 300 mg, 300 mg, oral, q8h, Brittany rKueger PA-C, 300 mg at 05/03/24 0615    vancomycin (Vancocin) pharmacy to dose - pharmacy monitoring, , miscellaneous, Daily PRN, Brittany Krueger PA-C    vancomycin-diluent combo no.1 (Xellia) IVPB 750 mg, 750 mg, intravenous, Once per day on Tuesday Thursday Saturday, Brittany Krueger PA-C, Stopped at 05/02/24 1925   Relevant Results    Results for orders placed or performed during the hospital encounter of  04/27/24 (from the past 96 hour(s))   CBC and Auto Differential   Result Value Ref Range    WBC 7.0 4.4 - 11.3 x10*3/uL    nRBC 0.0 0.0 - 0.0 /100 WBCs    RBC 3.14 (L) 4.00 - 5.20 x10*6/uL    Hemoglobin 8.0 (L) 12.0 - 16.0 g/dL    Hematocrit 26.7 (L) 36.0 - 46.0 %    MCV 85 80 - 100 fL    MCH 25.5 (L) 26.0 - 34.0 pg    MCHC 30.0 (L) 32.0 - 36.0 g/dL    RDW 18.9 (H) 11.5 - 14.5 %    Platelets 151 150 - 450 x10*3/uL    Neutrophils % 77.9 40.0 - 80.0 %    Immature Granulocytes %, Automated 0.3 0.0 - 0.9 %    Lymphocytes % 9.4 13.0 - 44.0 %    Monocytes % 11.6 2.0 - 10.0 %    Eosinophils % 0.4 0.0 - 6.0 %    Basophils % 0.4 0.0 - 2.0 %    Neutrophils Absolute 5.45 1.20 - 7.70 x10*3/uL    Immature Granulocytes Absolute, Automated 0.02 0.00 - 0.70 x10*3/uL    Lymphocytes Absolute 0.66 (L) 1.20 - 4.80 x10*3/uL    Monocytes Absolute 0.81 0.10 - 1.00 x10*3/uL    Eosinophils Absolute 0.03 0.00 - 0.70 x10*3/uL    Basophils Absolute 0.03 0.00 - 0.10 x10*3/uL   Renal function panel   Result Value Ref Range    Glucose 110 (H) 65 - 99 mg/dL    Sodium 132 (L) 133 - 145 mmol/L    Potassium 4.3 3.4 - 5.1 mmol/L    Chloride 92 (L) 97 - 107 mmol/L    Bicarbonate 22 (L) 24 - 31 mmol/L    Urea Nitrogen 19 8 - 25 mg/dL    Creatinine 4.00 (H) 0.40 - 1.60 mg/dL    eGFR 13 (L) >60 mL/min/1.73m*2    Calcium 9.0 8.5 - 10.4 mg/dL    Phosphorus 4.3 2.5 - 4.5 mg/dL    Albumin 3.3 (L) 3.5 - 5.0 g/dL    Anion Gap 18 <=19 mmol/L   Magnesium   Result Value Ref Range    Magnesium 2.40 1.60 - 3.10 mg/dL   Transthoracic Echo (TTE) Complete   Result Value Ref Range    AV pk peggy 3.56 m/s    AV mn grad 27.0 mmHg    MV E/A ratio 0.99     Tricuspid annular plane systolic excursion 1.8 cm    LV Biplane EF 62 %    LA vol index A/L 26.2 ml/m2    RV free wall pk S' 12.50 cm/s    RVSP 29.6 mmHg    LVIDd 3.93 cm    AV pk grad 50.7 mmHg    LV A4C EF 59.6    POCT GLUCOSE   Result Value Ref Range    POCT Glucose 111 (H) 74 - 99 mg/dL   POCT GLUCOSE   Result Value Ref  Range    POCT Glucose 113 (H) 74 - 99 mg/dL   POCT GLUCOSE   Result Value Ref Range    POCT Glucose 126 (H) 74 - 99 mg/dL   POCT GLUCOSE   Result Value Ref Range    POCT Glucose 129 (H) 74 - 99 mg/dL   POCT GLUCOSE   Result Value Ref Range    POCT Glucose 115 (H) 74 - 99 mg/dL   CBC and Auto Differential   Result Value Ref Range    WBC 5.4 4.4 - 11.3 x10*3/uL    nRBC 0.0 0.0 - 0.0 /100 WBCs    RBC 2.70 (L) 4.00 - 5.20 x10*6/uL    Hemoglobin 6.9 (L) 12.0 - 16.0 g/dL    Hematocrit 23.8 (L) 36.0 - 46.0 %    MCV 88 80 - 100 fL    MCH 25.6 (L) 26.0 - 34.0 pg    MCHC 29.0 (L) 32.0 - 36.0 g/dL    RDW 18.8 (H) 11.5 - 14.5 %    Platelets 143 (L) 150 - 450 x10*3/uL    Neutrophils % 73.7 40.0 - 80.0 %    Immature Granulocytes %, Automated 0.4 0.0 - 0.9 %    Lymphocytes % 11.1 13.0 - 44.0 %    Monocytes % 10.9 2.0 - 10.0 %    Eosinophils % 3.3 0.0 - 6.0 %    Basophils % 0.6 0.0 - 2.0 %    Neutrophils Absolute 3.97 1.20 - 7.70 x10*3/uL    Immature Granulocytes Absolute, Automated 0.02 0.00 - 0.70 x10*3/uL    Lymphocytes Absolute 0.60 (L) 1.20 - 4.80 x10*3/uL    Monocytes Absolute 0.59 0.10 - 1.00 x10*3/uL    Eosinophils Absolute 0.18 0.00 - 0.70 x10*3/uL    Basophils Absolute 0.03 0.00 - 0.10 x10*3/uL   Basic Metabolic Panel   Result Value Ref Range    Glucose 107 (H) 65 - 99 mg/dL    Sodium 131 (L) 133 - 145 mmol/L    Potassium 4.4 3.4 - 5.1 mmol/L    Chloride 93 (L) 97 - 107 mmol/L    Bicarbonate 23 (L) 24 - 31 mmol/L    Urea Nitrogen 32 (H) 8 - 25 mg/dL    Creatinine 5.50 (H) 0.40 - 1.60 mg/dL    eGFR 9 (L) >60 mL/min/1.73m*2    Calcium 8.4 (L) 8.5 - 10.4 mg/dL    Anion Gap 15 <=19 mmol/L   Magnesium   Result Value Ref Range    Magnesium 2.30 1.60 - 3.10 mg/dL   Phosphorus   Result Value Ref Range    Phosphorus 3.9 2.5 - 4.5 mg/dL   Calcium, ionized   Result Value Ref Range    POCT Calcium, Ionized 1.09 (L) 1.1 - 1.33 mmol/L   Morphology   Result Value Ref Range    RBC Morphology See Below     Polychromasia Mild      Hypochromia Mild    Type and screen   Result Value Ref Range    ABO TYPE A     Rh TYPE NEG     ANTIBODY SCREEN NEG    POCT GLUCOSE   Result Value Ref Range    POCT Glucose 104 (H) 74 - 99 mg/dL   Prepare RBC: 1 Units   Result Value Ref Range    PRODUCT CODE I0853I44     Unit Number [id number]     Unit ABO A     Unit RH NEG     XM INTEP COMP     Dispense Status TR     Blood Expiration Date May 02, 2024 23:59 EDT     PRODUCT BLOOD TYPE 0600     UNIT VOLUME 350    Blood Culture    Specimen: Dialysis; Blood culture   Result Value Ref Range    Blood Culture No growth at 2 days    Blood Culture    Specimen: Central Line/Catheter (Specify below); Blood culture   Result Value Ref Range    Blood Culture No growth at 2 days    CBC and Auto Differential   Result Value Ref Range    WBC 4.8 4.4 - 11.3 x10*3/uL    nRBC 0.0 0.0 - 0.0 /100 WBCs    RBC 3.24 (L) 4.00 - 5.20 x10*6/uL    Hemoglobin 8.2 (L) 12.0 - 16.0 g/dL    Hematocrit 27.2 (L) 36.0 - 46.0 %    MCV 84 80 - 100 fL    MCH 25.3 (L) 26.0 - 34.0 pg    MCHC 30.1 (L) 32.0 - 36.0 g/dL    RDW 19.7 (H) 11.5 - 14.5 %    Platelets 151 150 - 450 x10*3/uL    Neutrophils % 65.6 40.0 - 80.0 %    Immature Granulocytes %, Automated 0.6 0.0 - 0.9 %    Lymphocytes % 15.4 13.0 - 44.0 %    Monocytes % 14.0 2.0 - 10.0 %    Eosinophils % 4.0 0.0 - 6.0 %    Basophils % 0.4 0.0 - 2.0 %    Neutrophils Absolute 3.14 1.20 - 7.70 x10*3/uL    Immature Granulocytes Absolute, Automated 0.03 0.00 - 0.70 x10*3/uL    Lymphocytes Absolute 0.74 (L) 1.20 - 4.80 x10*3/uL    Monocytes Absolute 0.67 0.10 - 1.00 x10*3/uL    Eosinophils Absolute 0.19 0.00 - 0.70 x10*3/uL    Basophils Absolute 0.02 0.00 - 0.10 x10*3/uL   Basic Metabolic Panel   Result Value Ref Range    Glucose 113 (H) 65 - 99 mg/dL    Sodium 134 133 - 145 mmol/L    Potassium 3.6 3.4 - 5.1 mmol/L    Chloride 96 (L) 97 - 107 mmol/L    Bicarbonate 24 24 - 31 mmol/L    Urea Nitrogen 11 8 - 25 mg/dL    Creatinine 3.00 (H) 0.40 - 1.60 mg/dL     eGFR 19 (L) >60 mL/min/1.73m*2    Calcium 8.5 8.5 - 10.4 mg/dL    Anion Gap 14 <=19 mmol/L   Magnesium   Result Value Ref Range    Magnesium 2.00 1.60 - 3.10 mg/dL   Phosphorus   Result Value Ref Range    Phosphorus 2.2 (L) 2.5 - 4.5 mg/dL   Troponin T   Result Value Ref Range    Troponin T, High Sensitivity 42 (HH) <=14 ng/L   Troponin T   Result Value Ref Range    Troponin T, High Sensitivity 41 (HH) <=14 ng/L   CBC and Auto Differential   Result Value Ref Range    WBC 6.0 4.4 - 11.3 x10*3/uL    nRBC 0.0 0.0 - 0.0 /100 WBCs    RBC 3.67 (L) 4.00 - 5.20 x10*6/uL    Hemoglobin 9.2 (L) 12.0 - 16.0 g/dL    Hematocrit 31.8 (L) 36.0 - 46.0 %    MCV 87 80 - 100 fL    MCH 25.1 (L) 26.0 - 34.0 pg    MCHC 28.9 (L) 32.0 - 36.0 g/dL    RDW 19.9 (H) 11.5 - 14.5 %    Platelets 179 150 - 450 x10*3/uL    Neutrophils % 73.0 40.0 - 80.0 %    Immature Granulocytes %, Automated 1.3 (H) 0.0 - 0.9 %    Lymphocytes % 10.6 13.0 - 44.0 %    Monocytes % 9.9 2.0 - 10.0 %    Eosinophils % 4.9 0.0 - 6.0 %    Basophils % 0.3 0.0 - 2.0 %    Neutrophils Absolute 4.35 1.20 - 7.70 x10*3/uL    Immature Granulocytes Absolute, Automated 0.08 0.00 - 0.70 x10*3/uL    Lymphocytes Absolute 0.63 (L) 1.20 - 4.80 x10*3/uL    Monocytes Absolute 0.59 0.10 - 1.00 x10*3/uL    Eosinophils Absolute 0.29 0.00 - 0.70 x10*3/uL    Basophils Absolute 0.02 0.00 - 0.10 x10*3/uL   Basic Metabolic Panel   Result Value Ref Range    Glucose 121 (H) 65 - 99 mg/dL    Sodium 136 133 - 145 mmol/L    Potassium 3.4 3.4 - 5.1 mmol/L    Chloride 96 (L) 97 - 107 mmol/L    Bicarbonate 22 (L) 24 - 31 mmol/L    Urea Nitrogen 17 8 - 25 mg/dL    Creatinine 5.30 (H) 0.40 - 1.60 mg/dL    eGFR 9 (L) >60 mL/min/1.73m*2    Calcium 8.7 8.5 - 10.4 mg/dL    Anion Gap 18 <=19 mmol/L   Magnesium   Result Value Ref Range    Magnesium 2.50 1.60 - 3.10 mg/dL   Phosphorus   Result Value Ref Range    Phosphorus 2.8 2.5 - 4.5 mg/dL   Vancomycin   Result Value Ref Range    Vancomycin 25.3 (H) 10.0 - 20.0  ug/mL   CBC and Auto Differential   Result Value Ref Range    WBC 6.6 4.4 - 11.3 x10*3/uL    nRBC 0.0 0.0 - 0.0 /100 WBCs    RBC 4.05 4.00 - 5.20 x10*6/uL    Hemoglobin 10.3 (L) 12.0 - 16.0 g/dL    Hematocrit 35.5 (L) 36.0 - 46.0 %    MCV 88 80 - 100 fL    MCH 25.4 (L) 26.0 - 34.0 pg    MCHC 29.0 (L) 32.0 - 36.0 g/dL    RDW 20.0 (H) 11.5 - 14.5 %    Platelets 231 150 - 450 x10*3/uL    Neutrophils % 72.2 40.0 - 80.0 %    Immature Granulocytes %, Automated 1.8 (H) 0.0 - 0.9 %    Lymphocytes % 13.6 13.0 - 44.0 %    Monocytes % 8.3 2.0 - 10.0 %    Eosinophils % 3.6 0.0 - 6.0 %    Basophils % 0.5 0.0 - 2.0 %    Neutrophils Absolute 4.80 1.20 - 7.70 x10*3/uL    Immature Granulocytes Absolute, Automated 0.12 0.00 - 0.70 x10*3/uL    Lymphocytes Absolute 0.90 (L) 1.20 - 4.80 x10*3/uL    Monocytes Absolute 0.55 0.10 - 1.00 x10*3/uL    Eosinophils Absolute 0.24 0.00 - 0.70 x10*3/uL    Basophils Absolute 0.03 0.00 - 0.10 x10*3/uL   Basic Metabolic Panel   Result Value Ref Range    Glucose 102 (H) 65 - 99 mg/dL    Sodium 138 133 - 145 mmol/L    Potassium 3.6 3.4 - 5.1 mmol/L    Chloride 98 97 - 107 mmol/L    Bicarbonate 23 (L) 24 - 31 mmol/L    Urea Nitrogen 13 8 - 25 mg/dL    Creatinine 4.20 (H) 0.40 - 1.60 mg/dL    eGFR 12 (L) >60 mL/min/1.73m*2    Calcium 8.4 (L) 8.5 - 10.4 mg/dL    Anion Gap 17 <=19 mmol/L   Magnesium   Result Value Ref Range    Magnesium 2.10 1.60 - 3.10 mg/dL   Phosphorus   Result Value Ref Range    Phosphorus 2.1 (L) 2.5 - 4.5 mg/dL       Assessment/Plan   End stage kidney disease next dialysis will be when the new catheter is placed  Hyperkalemia resolved  Septic shock improved going for removal of her dialysis catheter today continue antibiotics per infectious disease  Anemia of chronic kidney disease  Hypertension  Aortic and mitral valve replacements                   Zhou Pedersen MD

## 2024-05-03 NOTE — PROGRESS NOTES
"Batsheva Page is a 48 y.o. female on day 5 of admission presenting with Sepsis, due to unspecified organism, unspecified whether acute organ dysfunction present (Multi).    Subjective   Symptoms (0 - 10, Best to Worst)  [place] Symptom Assessment System  Pain Score: 0 - No pain  No acute events overnight. Guidewire placed through Left chestwall permacath, then permacath removed. Guidewire remains in place for line holiday. Resting comfortably in bed currently.  No further runs of SVT.        Objective     Physical Exam    Last Recorded Vitals  Blood pressure (!) 101/30, pulse 87, temperature 36.4 °C (97.5 °F), temperature source Temporal, resp. rate 19, height 1.727 m (5' 8\"), weight 66.2 kg (145 lb 15.1 oz), SpO2 97%.  Intake/Output last 3 Shifts:  I/O last 3 completed shifts:  In: 1500 (22.7 mL/kg) [I.V.:800 (12.1 mL/kg); Other:400; IV Piggyback:300]  Out: 4403 (66.5 mL/kg) [Other:4400; Blood:3]  Weight: 66.2 kg     Relevant Results                 Assessment/Plan   IMP:    MRSA septicemia - 2/2 HD cath infected line. ID is following. LAURA negative vegetation. Guidewire in place for line holiday.   ESRD on HD - dialysis Tues/Thurs/Sat, Dr. Pedersen following  Chronic pain - prn dilaudid has been effective per the patient adequately controlled  Anxiety - has order for Vistaril PRN, she has not used. Encouraged her to use if she feels needed. Cont bupropion;  consult ordered, await their input  Valve disease - history of replacement aortic and mitral valves     Palliative Care Encounter  DNR-CCA-DNI  Pt is capable decision maker.  Daughter, Xiao is hc-POA but we do not have this documentation in record.     5/3  Treatment model of care, pain improved with dilaudid.  following for anxiety. No discharge plans at this time. NO changes to plan from palliative perspective.     I spent 20 minutes in the professional and overall care of this patient.      Elzbieta Wood, APRN-CNP      "

## 2024-05-03 NOTE — CARE PLAN
Problem: Pain - Adult  Goal: Verbalizes/displays adequate comfort level or baseline comfort level  Outcome: Progressing     Problem: Safety - Adult  Goal: Free from fall injury  Outcome: Progressing     Problem: Discharge Planning  Goal: Discharge to home or other facility with appropriate resources  Outcome: Progressing     Problem: Chronic Conditions and Co-morbidities  Goal: Patient's chronic conditions and co-morbidity symptoms are monitored and maintained or improved  Outcome: Progressing     Problem: Skin  Goal: Decreased wound size/increased tissue granulation at next dressing change  Outcome: Progressing  Goal: Participates in plan/prevention/treatment measures  Outcome: Progressing  Goal: Prevent/manage excess moisture  Outcome: Progressing  Goal: Prevent/minimize sheer/friction injuries  Outcome: Progressing  Goal: Promote/optimize nutrition  Outcome: Progressing  Goal: Promote skin healing  Outcome: Progressing     Problem: Pain  Goal: Takes deep breaths with improved pain control throughout the shift  Outcome: Progressing  Goal: Turns in bed with improved pain control throughout the shift  Outcome: Progressing  Goal: Walks with improved pain control throughout the shift  Outcome: Progressing  Goal: Performs ADL's with improved pain control throughout shift  Outcome: Progressing  Goal: Participates in PT with improved pain control throughout the shift  Outcome: Progressing  Goal: Free from opioid side effects throughout the shift  Outcome: Progressing  Goal: Free from acute confusion related to pain meds throughout the shift  Outcome: Progressing     Problem: Fall/Injury  Goal: Not fall by end of shift  Outcome: Progressing  Goal: Be free from injury by end of the shift  Outcome: Progressing  Goal: Verbalize understanding of personal risk factors for fall in the hospital  Outcome: Progressing  Goal: Verbalize understanding of risk factor reduction measures to prevent injury from fall in the  home  Outcome: Progressing  Goal: Pace activities to prevent fatigue by end of the shift  Outcome: Progressing           The patient's goals for the shift include      The clinical goals for the shift include pain control, remain hemodynamically stable

## 2024-05-03 NOTE — PROGRESS NOTES
Batsheva Page is a 48 y.o. female on day 5 of admission presenting with Sepsis, due to unspecified organism, unspecified whether acute organ dysfunction present (Multi).      Subjective   Patient reports that she feels sick today. Says she has a runny nose, sore throat, cough. Says this started overnight. Otherwise, she slept well and pain is fairly well controlled. She is requesting benadryl to be q4 with her dilaudid.       Objective     Last Recorded Vitals  /57   Pulse 99   Temp 37.4 °C (99.3 °F) (Temporal)   Resp 14   Wt 66.2 kg (145 lb 15.1 oz)   SpO2 96%   Intake/Output last 3 Shifts:    Intake/Output Summary (Last 24 hours) at 5/3/2024 0825  Last data filed at 5/2/2024 1925  Gross per 24 hour   Intake 1300 ml   Output 4403 ml   Net -3103 ml       Admission Weight  Weight: 63.7 kg (140 lb 6.9 oz) (04/27/24 2039)    Daily Weight  05/03/24 : 66.2 kg (145 lb 15.1 oz)    Image Results  IR CVC exchange  Narrative: Interpreted By:  Chris Lott,   STUDY:  IR CVC EXCHANGE; 5/2/2024 4:13 pm      INDICATION:  Septic shock due to MRSA bacteremia. Referred for removal of tunneled  central venous catheter. Poor upper body central venous access.      COMPARISON:  None      ACCESSION NUMBER(S):  [id number]      ORDERING CLINICIAN:  MARCIO BECERRA      TECHNIQUE:  Conscious sedation: 30 minutes.  Exchange of tunneled central venous catheter.  Fluoroscopy time 1.1 minute, images 3, dose 4.59 mGy air kerma.      FINDINGS:  Informed consent obtained. Patient positioned supine. All elements of  maximal sterile barrier were utilized including cap, mask, sterile  gown, sterile gloves, large sterile drape, hand scrub and 2%  chlorhexidine for cleaning. Left upper chest wall and indwelling  catheter prepped and draped.      Indwelling tunneled central venous hemodialysis catheter was removed  over Amplatz wire. A 4 [place] Leija catheter was advanced over  the wire to retained access for subsequent  reinsertion of  hemodialysis catheter, few days after continued clear blood cultures.  This was secured with sutures and covered with sterile dressing. The  tip of the removed catheter was sent for culture. Patient tolerated  procedure well.      Impression: Removal of tunneled central venous dialysis catheter over wire. A  small caliber catheter was left within the tunneled tract/central  venous system to retain access for subsequent reinsertion of  hemodialysis catheter, given patient's lack of patent central venous  access via upper chest.          Signed by: Chris Lott 5/3/2024 8:20 AM  Dictation workstation:   [id number]      Physical Exam  General: alert, no diaphoresis   Lungs: CTA BL   Heart: RRR with ectopy, no LE edema BL   GI: abdomen soft, nontender, nondistended, BS present   MSK: no joint effusion or deformity   Skin: scabbed lesions noted on BL legs. No concerning areas of erythema   Neuro: grossly normal cognition, motor strength, sensation      Relevant Results               Assessment/Plan                  Principal Problem:    Sepsis, due to unspecified organism, unspecified whether acute organ dysfunction present (Multi)    MRSA bacteremia  Septic shock-- now resolved  - with history of recurrent MRSA bacteremia and infective endocarditis (aortic valve in 2020 and mitral in 2013 and 2020 replacements)  - on vancomycin- antibiotic course per Dr. Soto  - had a temporary dialysis line placed on admission in R groin- this was removed yesterday. She has a permacath in left chest wall but she has scarce venous access, and IR/vascular surgery have said that if we lose this line we will likely not be able to get a new one in. Yesterday a guidewire was placed through her left chest wall permacath, and the permacath was removed. Guidewire remains in place for a 'line holiday'.   - LAURA shows no vegetations  - rifampin    ESRD on HD  - as above-- HD today.     SVT  - improved with increased dose of  metoprolol. We can continue to increase metoprolol if further issues but currently rates are better and less issues with arrhythmia.    Myalgias, body pain  - prn dilaudid which is helping    Pruritus  - continue benadryl, claritin, topicals    Anemia of chronic renal disease  - stable, no bleeding    Depression  - continue bupropion    HTN  - continue clonidine, hydralazine, PO lopressor    Cold symptoms  - currently refusing prn meds.  - check covid 19    DVT ppx              Kinza Weiner DO

## 2024-05-04 LAB
ANION GAP SERPL CALC-SCNC: 18 MMOL/L
BACTERIA BLD CULT: NORMAL
BACTERIA BLD CULT: NORMAL
BASOPHILS # BLD AUTO: 0.02 X10*3/UL (ref 0–0.1)
BASOPHILS NFR BLD AUTO: 0.3 %
BUN SERPL-MCNC: 30 MG/DL (ref 8–25)
CALCIUM SERPL-MCNC: 7.7 MG/DL (ref 8.5–10.4)
CHLORIDE SERPL-SCNC: 98 MMOL/L (ref 97–107)
CO2 SERPL-SCNC: 20 MMOL/L (ref 24–31)
CREAT SERPL-MCNC: 6.7 MG/DL (ref 0.4–1.6)
EGFRCR SERPLBLD CKD-EPI 2021: 7 ML/MIN/1.73M*2
EOSINOPHIL # BLD AUTO: 0.15 X10*3/UL (ref 0–0.7)
EOSINOPHIL NFR BLD AUTO: 2.6 %
ERYTHROCYTE [DISTWIDTH] IN BLOOD BY AUTOMATED COUNT: 19.9 % (ref 11.5–14.5)
GLUCOSE SERPL-MCNC: 113 MG/DL (ref 65–99)
HCT VFR BLD AUTO: 30.2 % (ref 36–46)
HGB BLD-MCNC: 9 G/DL (ref 12–16)
IMM GRANULOCYTES # BLD AUTO: 0.11 X10*3/UL (ref 0–0.7)
IMM GRANULOCYTES NFR BLD AUTO: 1.9 % (ref 0–0.9)
LYMPHOCYTES # BLD AUTO: 1.03 X10*3/UL (ref 1.2–4.8)
LYMPHOCYTES NFR BLD AUTO: 17.9 %
MCH RBC QN AUTO: 25.5 PG (ref 26–34)
MCHC RBC AUTO-ENTMCNC: 29.8 G/DL (ref 32–36)
MCV RBC AUTO: 86 FL (ref 80–100)
MONOCYTES # BLD AUTO: 0.42 X10*3/UL (ref 0.1–1)
MONOCYTES NFR BLD AUTO: 7.3 %
NEUTROPHILS # BLD AUTO: 4.01 X10*3/UL (ref 1.2–7.7)
NEUTROPHILS NFR BLD AUTO: 70 %
NRBC BLD-RTO: 0 /100 WBCS (ref 0–0)
PLATELET # BLD AUTO: 250 X10*3/UL (ref 150–450)
POTASSIUM SERPL-SCNC: 3.5 MMOL/L (ref 3.4–5.1)
RBC # BLD AUTO: 3.53 X10*6/UL (ref 4–5.2)
SODIUM SERPL-SCNC: 136 MMOL/L (ref 133–145)
VANCOMYCIN SERPL-MCNC: 28.2 UG/ML (ref 10–20)
WBC # BLD AUTO: 5.7 X10*3/UL (ref 4.4–11.3)

## 2024-05-04 PROCEDURE — 2500000001 HC RX 250 WO HCPCS SELF ADMINISTERED DRUGS (ALT 637 FOR MEDICARE OP): Performed by: HOSPITALIST

## 2024-05-04 PROCEDURE — 2500000004 HC RX 250 GENERAL PHARMACY W/ HCPCS (ALT 636 FOR OP/ED)

## 2024-05-04 PROCEDURE — 80202 ASSAY OF VANCOMYCIN: CPT | Performed by: INTERNAL MEDICINE

## 2024-05-04 PROCEDURE — 36415 COLL VENOUS BLD VENIPUNCTURE: CPT

## 2024-05-04 PROCEDURE — 2500000004 HC RX 250 GENERAL PHARMACY W/ HCPCS (ALT 636 FOR OP/ED): Performed by: HOSPITALIST

## 2024-05-04 PROCEDURE — 2060000001 HC INTERMEDIATE ICU ROOM DAILY

## 2024-05-04 PROCEDURE — 2500000001 HC RX 250 WO HCPCS SELF ADMINISTERED DRUGS (ALT 637 FOR MEDICARE OP)

## 2024-05-04 PROCEDURE — 85025 COMPLETE CBC W/AUTO DIFF WBC: CPT

## 2024-05-04 PROCEDURE — 80048 BASIC METABOLIC PNL TOTAL CA: CPT

## 2024-05-04 PROCEDURE — 99232 SBSQ HOSP IP/OBS MODERATE 35: CPT | Performed by: NURSE PRACTITIONER

## 2024-05-04 RX ORDER — CLONIDINE HYDROCHLORIDE 0.1 MG/1
0.1 TABLET ORAL EVERY 12 HOURS
Status: DISCONTINUED | OUTPATIENT
Start: 2024-05-04 | End: 2024-05-08 | Stop reason: HOSPADM

## 2024-05-04 RX ADMIN — HYDRALAZINE HYDROCHLORIDE 50 MG: 50 TABLET ORAL at 08:35

## 2024-05-04 RX ADMIN — DIPHENHYDRAMINE HYDROCHLORIDE 25 MG: 50 INJECTION, SOLUTION INTRAMUSCULAR; INTRAVENOUS at 21:15

## 2024-05-04 RX ADMIN — HYDROMORPHONE HYDROCHLORIDE 0.2 MG: 1 INJECTION, SOLUTION INTRAMUSCULAR; INTRAVENOUS; SUBCUTANEOUS at 08:36

## 2024-05-04 RX ADMIN — HYDROMORPHONE HYDROCHLORIDE 0.2 MG: 1 INJECTION, SOLUTION INTRAMUSCULAR; INTRAVENOUS; SUBCUTANEOUS at 16:57

## 2024-05-04 RX ADMIN — DIPHENHYDRAMINE HYDROCHLORIDE 25 MG: 50 INJECTION, SOLUTION INTRAMUSCULAR; INTRAVENOUS at 16:57

## 2024-05-04 RX ADMIN — MIRTAZAPINE 15 MG: 15 TABLET, FILM COATED ORAL at 21:02

## 2024-05-04 RX ADMIN — DIPHENHYDRAMINE HYDROCHLORIDE 25 MG: 50 INJECTION, SOLUTION INTRAMUSCULAR; INTRAVENOUS at 12:47

## 2024-05-04 RX ADMIN — ASPIRIN 81 MG: 81 TABLET, COATED ORAL at 08:35

## 2024-05-04 RX ADMIN — METOPROLOL TARTRATE 25 MG: 25 TABLET, FILM COATED ORAL at 08:35

## 2024-05-04 RX ADMIN — LEVETIRACETAM 500 MG: 500 TABLET, FILM COATED ORAL at 21:01

## 2024-05-04 RX ADMIN — CALCITRIOL CAPSULES 0.25 MCG 0.5 MCG: 0.25 CAPSULE ORAL at 08:35

## 2024-05-04 RX ADMIN — HYDRALAZINE HYDROCHLORIDE 50 MG: 50 TABLET ORAL at 21:00

## 2024-05-04 RX ADMIN — DIPHENHYDRAMINE HYDROCHLORIDE 25 MG: 50 INJECTION, SOLUTION INTRAMUSCULAR; INTRAVENOUS at 08:35

## 2024-05-04 RX ADMIN — RIFAMPIN 300 MG: 300 CAPSULE ORAL at 21:05

## 2024-05-04 RX ADMIN — CLONIDINE HYDROCHLORIDE 0.1 MG: 0.1 TABLET ORAL at 06:14

## 2024-05-04 RX ADMIN — PREGABALIN 50 MG: 50 CAPSULE ORAL at 21:02

## 2024-05-04 RX ADMIN — RIFAMPIN 300 MG: 300 CAPSULE ORAL at 06:14

## 2024-05-04 RX ADMIN — HYDROMORPHONE HYDROCHLORIDE 0.2 MG: 1 INJECTION, SOLUTION INTRAMUSCULAR; INTRAVENOUS; SUBCUTANEOUS at 12:47

## 2024-05-04 RX ADMIN — Medication 1 MG: at 21:01

## 2024-05-04 RX ADMIN — HYDROMORPHONE HYDROCHLORIDE 0.2 MG: 1 INJECTION, SOLUTION INTRAMUSCULAR; INTRAVENOUS; SUBCUTANEOUS at 03:54

## 2024-05-04 RX ADMIN — HYDROMORPHONE HYDROCHLORIDE 0.2 MG: 1 INJECTION, SOLUTION INTRAMUSCULAR; INTRAVENOUS; SUBCUTANEOUS at 21:15

## 2024-05-04 RX ADMIN — CLONIDINE HYDROCHLORIDE 0.1 MG: 0.1 TABLET ORAL at 17:00

## 2024-05-04 RX ADMIN — RIFAMPIN 300 MG: 300 CAPSULE ORAL at 14:57

## 2024-05-04 RX ADMIN — ATORVASTATIN CALCIUM 40 MG: 40 TABLET, FILM COATED ORAL at 21:00

## 2024-05-04 RX ADMIN — PREGABALIN 50 MG: 50 CAPSULE ORAL at 08:35

## 2024-05-04 RX ADMIN — DIPHENHYDRAMINE HYDROCHLORIDE 25 MG: 50 INJECTION, SOLUTION INTRAMUSCULAR; INTRAVENOUS at 03:54

## 2024-05-04 RX ADMIN — HEPARIN SODIUM 5000 UNITS: 5000 INJECTION, SOLUTION INTRAVENOUS; SUBCUTANEOUS at 21:02

## 2024-05-04 ASSESSMENT — PAIN SCALES - PAIN ASSESSMENT IN ADVANCED DEMENTIA (PAINAD)
TOTALSCORE: 0
BODYLANGUAGE: RELAXED
BREATHING: NORMAL
TOTALSCORE: MEDICATION (SEE MAR);REPOSITIONED
CONSOLABILITY: NO NEED TO CONSOLE
FACIALEXPRESSION: SMILING OR INEXPRESSIVE

## 2024-05-04 ASSESSMENT — PAIN SCALES - GENERAL
PAINLEVEL_OUTOF10: 7
PAINLEVEL_OUTOF10: 10 - WORST POSSIBLE PAIN
PAINLEVEL_OUTOF10: 8
PAINLEVEL_OUTOF10: 0 - NO PAIN
PAINLEVEL_OUTOF10: 10 - WORST POSSIBLE PAIN
PAINLEVEL_OUTOF10: 6
PAINLEVEL_OUTOF10: 0 - NO PAIN
PAINLEVEL_OUTOF10: 7
PAINLEVEL_OUTOF10: 6

## 2024-05-04 ASSESSMENT — COGNITIVE AND FUNCTIONAL STATUS - GENERAL
MOBILITY SCORE: 21
DAILY ACTIVITIY SCORE: 24
MOBILITY SCORE: 21
CLIMB 3 TO 5 STEPS WITH RAILING: A LITTLE
WALKING IN HOSPITAL ROOM: A LITTLE
STANDING UP FROM CHAIR USING ARMS: A LITTLE
DAILY ACTIVITIY SCORE: 24
CLIMB 3 TO 5 STEPS WITH RAILING: A LITTLE
WALKING IN HOSPITAL ROOM: A LITTLE
STANDING UP FROM CHAIR USING ARMS: A LITTLE

## 2024-05-04 ASSESSMENT — PAIN DESCRIPTION - DESCRIPTORS
DESCRIPTORS: ACHING

## 2024-05-04 ASSESSMENT — PAIN SCALES - WONG BAKER: WONGBAKER_NUMERICALRESPONSE: HURTS LITTLE MORE

## 2024-05-04 ASSESSMENT — PAIN DESCRIPTION - ORIENTATION: ORIENTATION: RIGHT;LEFT

## 2024-05-04 NOTE — PROGRESS NOTES
"Batsheva Page is a 48 y.o. female on day 6 of admission presenting with Sepsis, due to unspecified organism, unspecified whether acute organ dysfunction present (Multi).    Subjective   Symptoms (0 - 10, Best to Worst)  [place] Symptom Assessment System  Pain Score: 8  Worried about catheter replacement on Monday. Severe anticipatory anxiety with any procedures. NO pain currently. Afebrile. Appetite decent.        Objective     Physical Exam    Last Recorded Vitals  Blood pressure (!) 93/45, pulse 86, temperature 36.7 °C (98.1 °F), temperature source Temporal, resp. rate (!) 27, height 1.727 m (5' 8\"), weight 67.4 kg (148 lb 9.4 oz), SpO2 96%.  Intake/Output last 3 Shifts:  I/O last 3 completed shifts:  In: 850 (12.7 mL/kg) [P.O.:700; IV Piggyback:150]  Out: - (0 mL/kg)   Weight: 67 kg     Relevant Results  Results for orders placed or performed during the hospital encounter of 04/27/24 (from the past 24 hour(s))   Vancomycin   Result Value Ref Range    Vancomycin 28.2 (H) 10.0 - 20.0 ug/mL   CBC and Auto Differential   Result Value Ref Range    WBC 5.7 4.4 - 11.3 x10*3/uL    nRBC 0.0 0.0 - 0.0 /100 WBCs    RBC 3.53 (L) 4.00 - 5.20 x10*6/uL    Hemoglobin 9.0 (L) 12.0 - 16.0 g/dL    Hematocrit 30.2 (L) 36.0 - 46.0 %    MCV 86 80 - 100 fL    MCH 25.5 (L) 26.0 - 34.0 pg    MCHC 29.8 (L) 32.0 - 36.0 g/dL    RDW 19.9 (H) 11.5 - 14.5 %    Platelets 250 150 - 450 x10*3/uL    Neutrophils % 70.0 40.0 - 80.0 %    Immature Granulocytes %, Automated 1.9 (H) 0.0 - 0.9 %    Lymphocytes % 17.9 13.0 - 44.0 %    Monocytes % 7.3 2.0 - 10.0 %    Eosinophils % 2.6 0.0 - 6.0 %    Basophils % 0.3 0.0 - 2.0 %    Neutrophils Absolute 4.01 1.20 - 7.70 x10*3/uL    Immature Granulocytes Absolute, Automated 0.11 0.00 - 0.70 x10*3/uL    Lymphocytes Absolute 1.03 (L) 1.20 - 4.80 x10*3/uL    Monocytes Absolute 0.42 0.10 - 1.00 x10*3/uL    Eosinophils Absolute 0.15 0.00 - 0.70 x10*3/uL    Basophils Absolute 0.02 0.00 - 0.10 x10*3/uL "   Basic Metabolic Panel   Result Value Ref Range    Glucose 113 (H) 65 - 99 mg/dL    Sodium 136 133 - 145 mmol/L    Potassium 3.5 3.4 - 5.1 mmol/L    Chloride 98 97 - 107 mmol/L    Bicarbonate 20 (L) 24 - 31 mmol/L    Urea Nitrogen 30 (H) 8 - 25 mg/dL    Creatinine 6.70 (H) 0.40 - 1.60 mg/dL    eGFR 7 (L) >60 mL/min/1.73m*2    Calcium 7.7 (L) 8.5 - 10.4 mg/dL    Anion Gap 18 <=19 mmol/L          Assessment/Plan   IMP:    MRSA septicemia - 2/2 HD cath infected line. ID is following. LAURA negative vegetation. Guidewire in place for line holiday.   ESRD on HD - dialysis Tues/Thurs/Sat, Dr. Pedersen following  Chronic pain - prn dilaudid has been effective per the patient adequately controlled  Anxiety -   Valve disease - history of replacement aortic and mitral valves     Palliative Care Encounter  DNR-CCA-DNI  Pt is capable decision maker.  Daughter, Xiao is hc-POA but we do not have this documentation in record.     5/3  Treatment model of care, pain improved with dilaudid.  following for anxiety. No discharge plans at this time. NO changes to plan from palliative perspective.     5/4  Pain controlled,  following for anxiety. IR to address meds during catheter replacement for anxiety. Patient did again state that she has been through so much and is not sure how much more she can handle. We discussed, and she is not ready at this time to consider hospice involvement but stated that she feels that her goals are slowly starting to focus more on quality of life and symptom control. She wants to continue all treatments at this time however.     I spent 30 minutes in the professional and overall care of this patient.      Elzbieta Wood, APRN-CNP       today

## 2024-05-04 NOTE — PROGRESS NOTES
Batsheva Page is a 48 y.o. female on day 6 of admission presenting with Sepsis, due to unspecified organism, unspecified whether acute organ dysfunction present (Multi).      Subjective   Patient reports soreness around the site of the guidewire otherwise she has no complaints.       Objective          Vitals 24HR  Heart Rate:  []   Temp:  [36.5 °C (97.7 °F)-36.9 °C (98.5 °F)]   Resp:  [19-34]   BP: ()/()   Weight:  [67 kg (147 lb 11.3 oz)-67.4 kg (148 lb 9.4 oz)]   SpO2:  [87 %-98 %]       Intake/Output last 3 Shifts:    Intake/Output Summary (Last 24 hours) at 5/4/2024 1132  Last data filed at 5/4/2024 0802  Gross per 24 hour   Intake 700 ml   Output 0 ml   Net 700 ml       Physical Exam  Constitutional:       General: She is awake. She is not in acute distress.  Cardiovascular:      Rate and Rhythm: Regular rhythm.      Heart sounds:      No friction rub.   Pulmonary:      Effort: Pulmonary effort is normal.      Comments: Diminished breath sounds in the left lung field anteriorly  Abdominal:      General: Bowel sounds are normal.      Palpations: Abdomen is soft.      Tenderness: There is no guarding or rebound.   Musculoskeletal:      Right lower leg: No edema.      Left lower leg: No edema.   Neurological:      Mental Status: She is alert.         Relevant Results  Results for orders placed or performed during the hospital encounter of 04/27/24 (from the past 24 hour(s))   Vancomycin   Result Value Ref Range    Vancomycin 28.2 (H) 10.0 - 20.0 ug/mL   CBC and Auto Differential   Result Value Ref Range    WBC 5.7 4.4 - 11.3 x10*3/uL    nRBC 0.0 0.0 - 0.0 /100 WBCs    RBC 3.53 (L) 4.00 - 5.20 x10*6/uL    Hemoglobin 9.0 (L) 12.0 - 16.0 g/dL    Hematocrit 30.2 (L) 36.0 - 46.0 %    MCV 86 80 - 100 fL    MCH 25.5 (L) 26.0 - 34.0 pg    MCHC 29.8 (L) 32.0 - 36.0 g/dL    RDW 19.9 (H) 11.5 - 14.5 %    Platelets 250 150 - 450 x10*3/uL    Neutrophils % 70.0 40.0 - 80.0 %    Immature Granulocytes %,  Automated 1.9 (H) 0.0 - 0.9 %    Lymphocytes % 17.9 13.0 - 44.0 %    Monocytes % 7.3 2.0 - 10.0 %    Eosinophils % 2.6 0.0 - 6.0 %    Basophils % 0.3 0.0 - 2.0 %    Neutrophils Absolute 4.01 1.20 - 7.70 x10*3/uL    Immature Granulocytes Absolute, Automated 0.11 0.00 - 0.70 x10*3/uL    Lymphocytes Absolute 1.03 (L) 1.20 - 4.80 x10*3/uL    Monocytes Absolute 0.42 0.10 - 1.00 x10*3/uL    Eosinophils Absolute 0.15 0.00 - 0.70 x10*3/uL    Basophils Absolute 0.02 0.00 - 0.10 x10*3/uL   Basic Metabolic Panel   Result Value Ref Range    Glucose 113 (H) 65 - 99 mg/dL    Sodium 136 133 - 145 mmol/L    Potassium 3.5 3.4 - 5.1 mmol/L    Chloride 98 97 - 107 mmol/L    Bicarbonate 20 (L) 24 - 31 mmol/L    Urea Nitrogen 30 (H) 8 - 25 mg/dL    Creatinine 6.70 (H) 0.40 - 1.60 mg/dL    eGFR 7 (L) >60 mL/min/1.73m*2    Calcium 7.7 (L) 8.5 - 10.4 mg/dL    Anion Gap 18 <=19 mmol/L            Assessment/Plan   End stage kidney disease -noted she has been cleared for new tunneled dialysis catheter by infectious disease, will plan for this on Monday morning by interventional radiology and then dialysis afterwards on Monday  Hyperkalemia resolved  Septic shock improved, has been on line holiday, antibiotics per infectious disease, see plan above  Anemia of chronic kidney disease - on Epogen  Hypertension  Aortic and mitral valve replacements     Vu Pedersen MD

## 2024-05-04 NOTE — PROGRESS NOTES
"INFECTIOUS DISEASES PROGRESS NOTE    Consulted / following patient for:  MRSA sepsis, presumed dialysis catheter infection    Subjective   Interval History:   (Sleeping soundly, not awakened)  Nurse reports she has had a quiet night, no new complaints, does not seem to be complaining of rhinorrhea or cough    Objective   PHYSICAL EXAMINATION  Vital signs:  Visit Vitals  /52   Pulse 90   Temp 36.5 °C (97.7 °F) (Temporal)   Resp 25   General: Sleeping soundly  Lungs: Clear  Chest: Dressing over prior dialysis catheter site  Heart: S1, S2 normal  Abdomen: Soft    Relevant Results  WBC     Microbiology:  Blood (4/27): MRSA X2  Blood (4/30): Negative X2  COVID-19 PCR (5/3): Negative  (Not tested for influenza)  Imaging:  CXR images personally reviewed: Vague density at the left heart border, doubt pneumonia  TTE: No mention of valvular vegetations  LAURA: No vegetations        ASSESSMENT:  MRSA septicemia/history of MRSA dialysis catheter infection  Recurrent MRSA septicemia, no evidence for recurrent endocarditis, likely due to infected dialysis catheter despite use of \"vancomycin lock.\"  Achievement of vascular access for dialysis has been extremely challenging, and Dr. Lott removed the tunneled catheter on 5/2 and left a guidewire in place.  Blood cultures remain sterile and she is it is okay for placement of a new tunneled catheter over the guidewire, which apparently is now planned for Monday 5/6.  Patient says she will consider peritoneal dialysis, but remains very reluctant because of remote adverse experience.      Cough and nasal congestion  COVID-19 negative, influenza not tested but clinically and epidemiologically seems unlikely    PLANS:  -   Continue vancomycin (pharmacy dosing) and rifampin  -   From ID viewpoint, okay for placement of a new tunneled catheter for dialysis    I will see 5/5 prn your call    Adama Soto MD  [facility]  Office:  [phone]  "

## 2024-05-04 NOTE — PROGRESS NOTES
Batsheva Page is a 48 y.o. female on day 6 of admission presenting with Sepsis, due to unspecified organism, unspecified whether acute organ dysfunction present (Multi).      Subjective   Patient reports that the guidewire is very painful. Still with myalgias. No SOB. Still with coughing and stuffy nose.     Objective     Last Recorded Vitals  /51   Pulse 88   Temp 36.6 °C (97.9 °F) (Temporal)   Resp 22   Wt 67 kg (147 lb 11.3 oz)   SpO2 90%   Intake/Output last 3 Shifts:    Intake/Output Summary (Last 24 hours) at 5/4/2024 0830  Last data filed at 5/4/2024 0400  Gross per 24 hour   Intake 700 ml   Output --   Net 700 ml       Admission Weight  Weight: 63.7 kg (140 lb 6.9 oz) (04/27/24 2039)    Daily Weight  05/04/24 : 67 kg (147 lb 11.3 oz)    Image Results  IR CVC exchange  Narrative: Interpreted By:  Chris Lott,   STUDY:  IR CVC EXCHANGE; 5/2/2024 4:13 pm      INDICATION:  Septic shock due to MRSA bacteremia. Referred for removal of tunneled  central venous catheter. Poor upper body central venous access.      COMPARISON:  None      ACCESSION NUMBER(S):  [id number]      ORDERING CLINICIAN:  MARCIO BECERRA      TECHNIQUE:  Conscious sedation: 30 minutes.  Exchange of tunneled central venous catheter.  Fluoroscopy time 1.1 minute, images 3, dose 4.59 mGy air kerma.      FINDINGS:  Informed consent obtained. Patient positioned supine. All elements of  maximal sterile barrier were utilized including cap, mask, sterile  gown, sterile gloves, large sterile drape, hand scrub and 2%  chlorhexidine for cleaning. Left upper chest wall and indwelling  catheter prepped and draped.      Indwelling tunneled central venous hemodialysis catheter was removed  over Amplatz wire. A 4 [place] Leija catheter was advanced over  the wire to retained access for subsequent reinsertion of  hemodialysis catheter, few days after continued clear blood cultures.  This was secured with sutures and covered with  sterile dressing. The  tip of the removed catheter was sent for culture. Patient tolerated  procedure well.      Impression: Removal of tunneled central venous dialysis catheter over wire. A  small caliber catheter was left within the tunneled tract/central  venous system to retain access for subsequent reinsertion of  hemodialysis catheter, given patient's lack of patent central venous  access via upper chest.          Signed by: Chris Lott 5/3/2024 8:20 AM  Dictation workstation:   [id number]      Physical Exam  General: alert, no diaphoresis   Lungs: CTA BL   Heart: RRR, trace LE edema BL   GI: abdomen soft, nontender, nondistended, BS present   MSK: no joint effusion or deformity   Skin: scabbed lesions noted on BL legs. No concerning areas of erythema   Neuro: grossly normal cognition, motor strength, sensation      Relevant Results               Assessment/Plan                  Principal Problem:    Sepsis, due to unspecified organism, unspecified whether acute organ dysfunction present (Multi)    MRSA bacteremia  Septic shock-- now resolved  - with history of recurrent MRSA bacteremia and infective endocarditis (aortic valve in 2020 and mitral in 2013 and 2020 replacements)  - on vancomycin- antibiotic course per Dr. Soto  - had a temporary dialysis line placed on admission in R groin- this was removed yesterday. She has a permacath in left chest wall but she has scarce venous access, and IR/vascular surgery have said that if we lose this line we will likely not be able to get a new one in. Thursday a guidewire was placed through her left chest wall permacath, and the permacath was removed. Guidewire remains in place for a 'line holiday'. Plan for permacath placement Monday.  - LAURA shows no vegetations  - rifampin    ESRD on HD  - as above-- HD today.     SVT  - improved with increased dose of metoprolol. We can continue to increase metoprolol if further issues but currently rates are better and less  issues with arrhythmia.    Myalgias, body pain  - prn dilaudid which is helping    Pruritus  - continue benadryl, claritin, topicals    Anemia of chronic renal disease  - stable, no bleeding    Depression  - continue bupropion    HTN  - continue clonidine, hydralazine, PO lopressor    Cold symptoms  - currently refusing prn meds right now.  - check covid 19    DVT ppx              Kinza Weiner DO

## 2024-05-04 NOTE — CARE PLAN
The patient's goals for the shift include  control pain    The clinical goals for the shift include control pain and remain hemodynamicallty stable        Problem: Pain - Adult  Goal: Verbalizes/displays adequate comfort level or baseline comfort level  Outcome: Progressing     Problem: Safety - Adult  Goal: Free from fall injury  Outcome: Progressing     Problem: Discharge Planning  Goal: Discharge to home or other facility with appropriate resources  Outcome: Progressing     Problem: Chronic Conditions and Co-morbidities  Goal: Patient's chronic conditions and co-morbidity symptoms are monitored and maintained or improved  Outcome: Progressing     Problem: Skin  Goal: Decreased wound size/increased tissue granulation at next dressing change  Outcome: Progressing  Flowsheets (Taken 5/3/2024 2204)  Decreased wound size/increased tissue granulation at next dressing change:   Promote sleep for wound healing   Protective dressings over bony prominences   Utilize specialty bed per algorithm  Goal: Participates in plan/prevention/treatment measures  Outcome: Progressing  Flowsheets (Taken 5/3/2024 2204)  Participates in plan/prevention/treatment measures:   Discuss with provider PT/OT consult   Elevate heels   Increase activity/out of bed for meals  Goal: Prevent/manage excess moisture  Outcome: Progressing  Flowsheets (Taken 5/3/2024 2204)  Prevent/manage excess moisture:   Cleanse incontinence/protect with barrier cream   Monitor for/manage infection if present   Moisturize dry skin  Goal: Prevent/minimize sheer/friction injuries  Outcome: Progressing  Flowsheets (Taken 5/3/2024 2204)  Prevent/minimize sheer/friction injuries:   Complete micro-shifts as needed if patient unable. Adjust patient position to relieve pressure points, not a full turn   Increase activity/out of bed for meals   Use pull sheet   Utilize specialty bed per algorithm  Goal: Promote/optimize nutrition  Outcome: Progressing  Flowsheets (Taken  5/3/2024 2204)  Promote/optimize nutrition:   Offer water/supplements/favorite foods   Monitor/record intake including meals   Consume > 50% meals/supplements  Goal: Promote skin healing  Outcome: Progressing  Flowsheets (Taken 5/3/2024 2204)  Promote skin healing:   Assess skin/pad under line(s)/device(s)   Protective dressings over bony prominences   Turn/reposition every 2 hours/use positioning/transfer devices     Problem: Pain  Goal: Takes deep breaths with improved pain control throughout the shift  Outcome: Progressing  Goal: Turns in bed with improved pain control throughout the shift  Outcome: Progressing  Goal: Walks with improved pain control throughout the shift  Outcome: Progressing  Goal: Performs ADL's with improved pain control throughout shift  Outcome: Progressing  Goal: Participates in PT with improved pain control throughout the shift  Outcome: Progressing  Goal: Free from opioid side effects throughout the shift  Outcome: Progressing  Goal: Free from acute confusion related to pain meds throughout the shift  Outcome: Progressing     Problem: Fall/Injury  Goal: Not fall by end of shift  Outcome: Progressing  Goal: Be free from injury by end of the shift  Outcome: Progressing  Goal: Verbalize understanding of personal risk factors for fall in the hospital  Outcome: Progressing  Goal: Verbalize understanding of risk factor reduction measures to prevent injury from fall in the home  Outcome: Progressing  Goal: Pace activities to prevent fatigue by end of the shift  Outcome: Progressing

## 2024-05-04 NOTE — PROGRESS NOTES
Vancomycin Dosing by Pharmacy- FOLLOW-UP (HEMODIALYSIS)    Batsheva Page is a 48 y.o. year old female who Pharmacy has been consulted for vancomycin dosing for Vancomycin Indications: Bacteremia, MRSA infected dialysis cath. Based on the patient's indication and renal status this patient will be dosed based on a pre-HD level of 20-25 mcg/mL.     Patient is currently on hemodialysis.    Vancomycin maintenance dose: 750 mg after each dialysis session Tues/Thurs/Sat, order is stopped for now, will re-evaluate on Monday 5/6     Vancomycin pre-HD level 28.2, will re-draw pre-HD Monday 5-6    Lab Results   Component Value Date    VANCORANDOM 28.2 (H) 05/04/2024    VANCOTROUGH 27.9 (HH) 09/26/2022       Visit Vitals  BP (!) 106/40   Pulse 88   Temp 36.6 °C (97.9 °F) (Temporal)   Resp 19        Lab Results   Component Value Date    CREATININE 6.70 (H) 05/04/2024    CREATININE 4.20 (H) 05/03/2024    CREATININE 5.30 (H) 05/02/2024    CREATININE 3.00 (H) 05/01/2024       I/O last 3 completed shifts:  In: 850 (12.7 mL/kg) [P.O.:700; IV Piggyback:150]  Out: - (0 mL/kg)   Weight: 67 kg     Assessment/Plan     Above goal random/trough level. Vancomycin will be held until next random level is obtained.  Pre-HD level will be obtained on 5/6 at 5:00. May be obtained sooner if clinically indicated. If level is above goal, random level with AM labs the next morning will be obtained.   Will continue to monitor renal function daily while on vancomycin and order serum creatinine at least every 48 hours if not already ordered.  Follow for continued vancomycin needs, clinical response, and signs/symptoms of toxicity.     Maurice García, CezarD

## 2024-05-05 LAB
ANION GAP SERPL CALC-SCNC: >19 MMOL/L
BASOPHILS # BLD AUTO: 0.02 X10*3/UL (ref 0–0.1)
BASOPHILS NFR BLD AUTO: 0.4 %
BUN SERPL-MCNC: 41 MG/DL (ref 8–25)
CALCIUM SERPL-MCNC: 7.8 MG/DL (ref 8.5–10.4)
CHLORIDE SERPL-SCNC: 96 MMOL/L (ref 97–107)
CO2 SERPL-SCNC: 19 MMOL/L (ref 24–31)
CREAT SERPL-MCNC: 8.8 MG/DL (ref 0.4–1.6)
EGFRCR SERPLBLD CKD-EPI 2021: 5 ML/MIN/1.73M*2
EOSINOPHIL # BLD AUTO: 0.38 X10*3/UL (ref 0–0.7)
EOSINOPHIL NFR BLD AUTO: 7.2 %
ERYTHROCYTE [DISTWIDTH] IN BLOOD BY AUTOMATED COUNT: 19.4 % (ref 11.5–14.5)
GLUCOSE SERPL-MCNC: 104 MG/DL (ref 65–99)
HCT VFR BLD AUTO: 30.6 % (ref 36–46)
HGB BLD-MCNC: 8.9 G/DL (ref 12–16)
IMM GRANULOCYTES # BLD AUTO: 0.07 X10*3/UL (ref 0–0.7)
IMM GRANULOCYTES NFR BLD AUTO: 1.3 % (ref 0–0.9)
LYMPHOCYTES # BLD AUTO: 1.02 X10*3/UL (ref 1.2–4.8)
LYMPHOCYTES NFR BLD AUTO: 19.4 %
MCH RBC QN AUTO: 25 PG (ref 26–34)
MCHC RBC AUTO-ENTMCNC: 29.1 G/DL (ref 32–36)
MCV RBC AUTO: 86 FL (ref 80–100)
MONOCYTES # BLD AUTO: 0.33 X10*3/UL (ref 0.1–1)
MONOCYTES NFR BLD AUTO: 6.3 %
NEUTROPHILS # BLD AUTO: 3.45 X10*3/UL (ref 1.2–7.7)
NEUTROPHILS NFR BLD AUTO: 65.4 %
NRBC BLD-RTO: 0 /100 WBCS (ref 0–0)
PLATELET # BLD AUTO: 285 X10*3/UL (ref 150–450)
POTASSIUM SERPL-SCNC: 3.8 MMOL/L (ref 3.4–5.1)
RBC # BLD AUTO: 3.56 X10*6/UL (ref 4–5.2)
SODIUM SERPL-SCNC: 137 MMOL/L (ref 133–145)
WBC # BLD AUTO: 5.3 X10*3/UL (ref 4.4–11.3)

## 2024-05-05 PROCEDURE — 36415 COLL VENOUS BLD VENIPUNCTURE: CPT | Performed by: HOSPITALIST

## 2024-05-05 PROCEDURE — 2500000001 HC RX 250 WO HCPCS SELF ADMINISTERED DRUGS (ALT 637 FOR MEDICARE OP): Performed by: HOSPITALIST

## 2024-05-05 PROCEDURE — 2500000004 HC RX 250 GENERAL PHARMACY W/ HCPCS (ALT 636 FOR OP/ED): Performed by: HOSPITALIST

## 2024-05-05 PROCEDURE — 80048 BASIC METABOLIC PNL TOTAL CA: CPT | Performed by: HOSPITALIST

## 2024-05-05 PROCEDURE — 85025 COMPLETE CBC W/AUTO DIFF WBC: CPT | Performed by: HOSPITALIST

## 2024-05-05 PROCEDURE — 2060000001 HC INTERMEDIATE ICU ROOM DAILY

## 2024-05-05 PROCEDURE — 99232 SBSQ HOSP IP/OBS MODERATE 35: CPT | Performed by: NURSE PRACTITIONER

## 2024-05-05 RX ORDER — HEPARIN SODIUM 1000 [USP'U]/ML
1000 INJECTION, SOLUTION INTRAVENOUS; SUBCUTANEOUS
Status: CANCELLED | OUTPATIENT
Start: 2024-05-06

## 2024-05-05 RX ADMIN — DIPHENHYDRAMINE HYDROCHLORIDE 25 MG: 50 INJECTION, SOLUTION INTRAMUSCULAR; INTRAVENOUS at 14:04

## 2024-05-05 RX ADMIN — METOPROLOL TARTRATE 25 MG: 25 TABLET, FILM COATED ORAL at 20:41

## 2024-05-05 RX ADMIN — HYDROMORPHONE HYDROCHLORIDE 0.2 MG: 1 INJECTION, SOLUTION INTRAMUSCULAR; INTRAVENOUS; SUBCUTANEOUS at 14:00

## 2024-05-05 RX ADMIN — PREGABALIN 50 MG: 50 CAPSULE ORAL at 09:09

## 2024-05-05 RX ADMIN — HYDROMORPHONE HYDROCHLORIDE 0.2 MG: 1 INJECTION, SOLUTION INTRAMUSCULAR; INTRAVENOUS; SUBCUTANEOUS at 05:44

## 2024-05-05 RX ADMIN — DIPHENHYDRAMINE HYDROCHLORIDE 25 MG: 50 INJECTION, SOLUTION INTRAMUSCULAR; INTRAVENOUS at 09:58

## 2024-05-05 RX ADMIN — ATORVASTATIN CALCIUM 40 MG: 40 TABLET, FILM COATED ORAL at 20:41

## 2024-05-05 RX ADMIN — PREGABALIN 50 MG: 50 CAPSULE ORAL at 20:41

## 2024-05-05 RX ADMIN — CLONIDINE HYDROCHLORIDE 0.1 MG: 0.1 TABLET ORAL at 18:12

## 2024-05-05 RX ADMIN — DIPHENHYDRAMINE HYDROCHLORIDE 25 MG: 50 INJECTION, SOLUTION INTRAMUSCULAR; INTRAVENOUS at 05:43

## 2024-05-05 RX ADMIN — RIFAMPIN 300 MG: 300 CAPSULE ORAL at 23:00

## 2024-05-05 RX ADMIN — HYDROMORPHONE HYDROCHLORIDE 0.2 MG: 1 INJECTION, SOLUTION INTRAMUSCULAR; INTRAVENOUS; SUBCUTANEOUS at 09:59

## 2024-05-05 RX ADMIN — RIFAMPIN 300 MG: 300 CAPSULE ORAL at 05:52

## 2024-05-05 RX ADMIN — ASPIRIN 81 MG: 81 TABLET, COATED ORAL at 09:09

## 2024-05-05 RX ADMIN — HYDROMORPHONE HYDROCHLORIDE 0.2 MG: 1 INJECTION, SOLUTION INTRAMUSCULAR; INTRAVENOUS; SUBCUTANEOUS at 18:12

## 2024-05-05 RX ADMIN — HEPARIN SODIUM 5000 UNITS: 5000 INJECTION, SOLUTION INTRAVENOUS; SUBCUTANEOUS at 05:32

## 2024-05-05 RX ADMIN — ERGOCALCIFEROL 1250 MCG: 1.25 CAPSULE ORAL at 09:09

## 2024-05-05 RX ADMIN — HYDRALAZINE HYDROCHLORIDE 50 MG: 50 TABLET ORAL at 09:09

## 2024-05-05 RX ADMIN — METOPROLOL TARTRATE 25 MG: 25 TABLET, FILM COATED ORAL at 09:09

## 2024-05-05 RX ADMIN — Medication 1 MG: at 20:41

## 2024-05-05 RX ADMIN — MIRTAZAPINE 15 MG: 15 TABLET, FILM COATED ORAL at 20:42

## 2024-05-05 RX ADMIN — CLONIDINE HYDROCHLORIDE 0.1 MG: 0.1 TABLET ORAL at 05:31

## 2024-05-05 RX ADMIN — RIFAMPIN 300 MG: 300 CAPSULE ORAL at 14:00

## 2024-05-05 RX ADMIN — HYDROMORPHONE HYDROCHLORIDE 0.2 MG: 1 INJECTION, SOLUTION INTRAMUSCULAR; INTRAVENOUS; SUBCUTANEOUS at 01:35

## 2024-05-05 RX ADMIN — DIPHENHYDRAMINE HYDROCHLORIDE 25 MG: 50 INJECTION, SOLUTION INTRAMUSCULAR; INTRAVENOUS at 23:14

## 2024-05-05 RX ADMIN — HYDRALAZINE HYDROCHLORIDE 50 MG: 50 TABLET ORAL at 20:42

## 2024-05-05 RX ADMIN — HYDROMORPHONE HYDROCHLORIDE 0.2 MG: 1 INJECTION, SOLUTION INTRAMUSCULAR; INTRAVENOUS; SUBCUTANEOUS at 23:14

## 2024-05-05 RX ADMIN — DIPHENHYDRAMINE HYDROCHLORIDE 25 MG: 50 INJECTION, SOLUTION INTRAMUSCULAR; INTRAVENOUS at 18:12

## 2024-05-05 RX ADMIN — CALCITRIOL CAPSULES 0.25 MCG 0.5 MCG: 0.25 CAPSULE ORAL at 09:09

## 2024-05-05 RX ADMIN — DIPHENHYDRAMINE HYDROCHLORIDE 25 MG: 50 INJECTION, SOLUTION INTRAMUSCULAR; INTRAVENOUS at 01:34

## 2024-05-05 ASSESSMENT — COGNITIVE AND FUNCTIONAL STATUS - GENERAL
MOBILITY SCORE: 21
CLIMB 3 TO 5 STEPS WITH RAILING: A LITTLE
MOBILITY SCORE: 21
STANDING UP FROM CHAIR USING ARMS: A LITTLE
WALKING IN HOSPITAL ROOM: A LITTLE
WALKING IN HOSPITAL ROOM: A LITTLE
STANDING UP FROM CHAIR USING ARMS: A LITTLE
DAILY ACTIVITIY SCORE: 24
CLIMB 3 TO 5 STEPS WITH RAILING: A LITTLE
DAILY ACTIVITIY SCORE: 24

## 2024-05-05 ASSESSMENT — PAIN SCALES - PAIN ASSESSMENT IN ADVANCED DEMENTIA (PAINAD)
CONSOLABILITY: NO NEED TO CONSOLE
TOTALSCORE: 0
TOTALSCORE: MEDICATION (SEE MAR);REPOSITIONED
TOTALSCORE: MEDICATION (SEE MAR);REPOSITIONED
FACIALEXPRESSION: SMILING OR INEXPRESSIVE
BODYLANGUAGE: RELAXED
CONSOLABILITY: NO NEED TO CONSOLE
BREATHING: NORMAL
BREATHING: NORMAL
BODYLANGUAGE: RELAXED

## 2024-05-05 ASSESSMENT — PAIN DESCRIPTION - LOCATION
LOCATION: GENERALIZED
LOCATION: OTHER (COMMENT)

## 2024-05-05 ASSESSMENT — PAIN - FUNCTIONAL ASSESSMENT
PAIN_FUNCTIONAL_ASSESSMENT: 0-10
PAIN_FUNCTIONAL_ASSESSMENT: FLACC (FACE, LEGS, ACTIVITY, CRY, CONSOLABILITY)
PAIN_FUNCTIONAL_ASSESSMENT: 0-10
PAIN_FUNCTIONAL_ASSESSMENT: FLACC (FACE, LEGS, ACTIVITY, CRY, CONSOLABILITY)
PAIN_FUNCTIONAL_ASSESSMENT: 0-10
PAIN_FUNCTIONAL_ASSESSMENT: 0-10

## 2024-05-05 ASSESSMENT — PAIN SCALES - GENERAL
PAINLEVEL_OUTOF10: 8
PAINLEVEL_OUTOF10: 7
PAINLEVEL_OUTOF10: 0 - NO PAIN
PAINLEVEL_OUTOF10: 2
PAINLEVEL_OUTOF10: 0 - NO PAIN
PAINLEVEL_OUTOF10: 2
PAINLEVEL_OUTOF10: 7
PAINLEVEL_OUTOF10: 10 - WORST POSSIBLE PAIN

## 2024-05-05 ASSESSMENT — PAIN SCALES - WONG BAKER
WONGBAKER_NUMERICALRESPONSE: HURTS LITTLE MORE
WONGBAKER_NUMERICALRESPONSE: HURTS LITTLE BIT

## 2024-05-05 NOTE — PROGRESS NOTES
BRIEF  ID  CHART  REVIEW  NOTE      Chart reviewed  Vital signs stable, transferred to SDU  April 30 blood cultures remain negative  Plan for new tunneled catheter 5/6  Would still consider transition to peritoneal dialysis  Will reassess 5/6, please call sooner prn    Adama Soto MD  [facility] [facility]  Office:  [phone]

## 2024-05-05 NOTE — PROGRESS NOTES
Batsheva Page is a 48 y.o. female on day 7 of admission presenting with Sepsis, due to unspecified organism, unspecified whether acute organ dysfunction present (Multi).      Subjective   Still very uncomfortable regarding the guidewire.    No other complaints. Cold symptoms a little better.     Says she is looking forward to having new permacath placed.    Objective     Last Recorded Vitals  BP (!) 102/42 (BP Location: Right leg, Patient Position: Lying)   Pulse 75   Temp 36.8 °C (98.2 °F) (Oral)   Resp 18   Wt 64.8 kg (142 lb 13.7 oz)   SpO2 97%   Intake/Output last 3 Shifts:    Intake/Output Summary (Last 24 hours) at 5/5/2024 0909  Last data filed at 5/5/2024 0400  Gross per 24 hour   Intake 220 ml   Output 1 ml   Net 219 ml       Admission Weight  Weight: 63.7 kg (140 lb 6.9 oz) (04/27/24 2039)    Daily Weight  05/05/24 : 64.8 kg (142 lb 13.7 oz)    Image Results  IR CVC exchange  Narrative: Interpreted By:  Chris Lott,   STUDY:  IR CVC EXCHANGE; 5/2/2024 4:13 pm      INDICATION:  Septic shock due to MRSA bacteremia. Referred for removal of tunneled  central venous catheter. Poor upper body central venous access.      COMPARISON:  None      ACCESSION NUMBER(S):  [id number]      ORDERING CLINICIAN:  MARCIO BECERRA      TECHNIQUE:  Conscious sedation: 30 minutes.  Exchange of tunneled central venous catheter.  Fluoroscopy time 1.1 minute, images 3, dose 4.59 mGy air kerma.      FINDINGS:  Informed consent obtained. Patient positioned supine. All elements of  maximal sterile barrier were utilized including cap, mask, sterile  gown, sterile gloves, large sterile drape, hand scrub and 2%  chlorhexidine for cleaning. Left upper chest wall and indwelling  catheter prepped and draped.      Indwelling tunneled central venous hemodialysis catheter was removed  over Amplatz wire. A 4 Persian Leija catheter was advanced over  the wire to retained access for subsequent reinsertion of  hemodialysis  catheter, few days after continued clear blood cultures.  This was secured with sutures and covered with sterile dressing. The  tip of the removed catheter was sent for culture. Patient tolerated  procedure well.      Impression: Removal of tunneled central venous dialysis catheter over wire. A  small caliber catheter was left within the tunneled tract/central  venous system to retain access for subsequent reinsertion of  hemodialysis catheter, given patient's lack of patent central venous  access via upper chest.          Signed by: Chris Lott 5/3/2024 8:20 AM  Dictation workstation:   [id number]      Physical Exam  General: alert, no diaphoresis   Lungs: CTA BL   Heart: RRR, trace LE edema BL   GI: abdomen soft, nontender, nondistended, BS present   MSK: no joint effusion or deformity   Skin: scabbed lesions noted on BL legs. No concerning areas of erythema   Neuro: grossly normal cognition, motor strength, sensation      Relevant Results               Assessment/Plan                  Principal Problem:    Sepsis, due to unspecified organism, unspecified whether acute organ dysfunction present (Multi)    MRSA bacteremia  Septic shock-- now resolved  - with history of recurrent MRSA bacteremia and infective endocarditis (aortic valve in 2020 and mitral in 2013 and 2020 replacements)  - on vancomycin- antibiotic course per Dr. Soto; and rifampin  - had a temporary dialysis line placed on admission in R groin- this was removed yesterday. She has a permacath in left chest wall but she has scarce venous access, and IR/vascular surgery have said that if we lose this line we will likely not be able to get a new one in. Thursday a guidewire was placed through her left chest wall permacath, and the permacath was removed. Guidewire remains in place for a 'line holiday'. Plan for permacath placement Monday.  - LAURA shows no vegetations    ESRD on HD  - as above-- HD today.   - Dr Soto recommending peritoneal dialysis. I  had a long conversation with the patient today regarding PD. She says that she had been on it many years ago, when she was just starting out with ESRD, and under a different nephrologist's care. Says she got very sick and was hospitalized, though it turned out her dialysate solution had been incorrect a tthe time. During hospitalization she was switched to HD, and she has never been back on PD since. She says she would consider PD if she was a candidate for it; says she'll keep talking to Dr. Pedersen about it.     SVT  - no further issues on this dose of lopressor. Vitals recorded show bradycardia, but I reviewed all of telemetry from overnight and there is no documented bradycardia. Will continue to monitor.    Myalgias, body pain  - prn dilaudid which is helping    Pruritus  - continue benadryl, claritin, topicals    Anemia of chronic renal disease  - stable, no bleeding    Depression  - continue bupropion    HTN  - continue clonidine, hydralazine, PO lopressor    Cold symptoms  - currently refusing prn meds  - check covid 19    DVT ppx              Kinza Weiner DO

## 2024-05-05 NOTE — CARE PLAN
Problem: Pain - Adult  Goal: Verbalizes/displays adequate comfort level or baseline comfort level  5/5/2024 0030 by Ruthie Ayoub RN  Outcome: Progressing  5/5/2024 0029 by Ruthie Ayoub RN  Outcome: Progressing     Problem: Safety - Adult  Goal: Free from fall injury  5/5/2024 0030 by Ruthie Ayoub RN  Outcome: Progressing  5/5/2024 0029 by Ruthie Ayoub RN  Outcome: Progressing     Problem: Discharge Planning  Goal: Discharge to home or other facility with appropriate resources  5/5/2024 0030 by Ruthie Ayoub RN  Outcome: Progressing  5/5/2024 0029 by Ruthie Ayoub RN  Outcome: Progressing     Problem: Chronic Conditions and Co-morbidities  Goal: Patient's chronic conditions and co-morbidity symptoms are monitored and maintained or improved  5/5/2024 0030 by Ruthie Ayoub RN  Outcome: Progressing  5/5/2024 0029 by Ruthie Ayoub RN  Outcome: Progressing     Problem: Skin  Goal: Decreased wound size/increased tissue granulation at next dressing change  5/5/2024 0030 by Ruthie Ayoub RN  Outcome: Progressing  Flowsheets (Taken 5/4/2024 1945)  Decreased wound size/increased tissue granulation at next dressing change:   Promote sleep for wound healing   Protective dressings over bony prominences   Utilize specialty bed per algorithm  5/5/2024 0029 by Ruthie Ayoub RN  Outcome: Progressing  Flowsheets (Taken 5/4/2024 1945)  Decreased wound size/increased tissue granulation at next dressing change:   Promote sleep for wound healing   Protective dressings over bony prominences   Utilize specialty bed per algorithm  Goal: Participates in plan/prevention/treatment measures  5/5/2024 0030 by Ruthie Ayoub RN  Outcome: Progressing  Flowsheets (Taken 5/4/2024 1945)  Participates in plan/prevention/treatment measures: Discuss with provider PT/OT consult  5/5/2024 0029 by Ruthie Ayoub RN  Outcome:  Progressing  Flowsheets (Taken 5/4/2024 1945)  Participates in plan/prevention/treatment measures: Discuss with provider PT/OT consult  Goal: Prevent/manage excess moisture  5/5/2024 0030 by Ruthie Ayoub RN  Outcome: Progressing  Flowsheets (Taken 5/4/2024 1945)  Prevent/manage excess moisture:   Cleanse incontinence/protect with barrier cream   Monitor for/manage infection if present   Moisturize dry skin  5/5/2024 0029 by Ruthie Ayoub RN  Outcome: Progressing  Flowsheets (Taken 5/4/2024 1945)  Prevent/manage excess moisture:   Cleanse incontinence/protect with barrier cream   Monitor for/manage infection if present   Moisturize dry skin  Goal: Prevent/minimize sheer/friction injuries  5/5/2024 0030 by Ruthie Ayoub RN  Outcome: Progressing  Flowsheets (Taken 5/4/2024 1945)  Prevent/minimize sheer/friction injuries:   Increase activity/out of bed for meals   Use pull sheet   HOB 30 degrees or less   Turn/reposition every 2 hours/use positioning/transfer devices  5/5/2024 0029 by Ruthie Ayoub RN  Outcome: Progressing  Flowsheets (Taken 5/4/2024 1945)  Prevent/minimize sheer/friction injuries:   Increase activity/out of bed for meals   Use pull sheet   HOB 30 degrees or less   Turn/reposition every 2 hours/use positioning/transfer devices  Goal: Promote/optimize nutrition  5/5/2024 0030 by Ruthie Ayoub RN  Outcome: Progressing  Flowsheets (Taken 5/4/2024 1945)  Promote/optimize nutrition: Monitor/record intake including meals  5/5/2024 0029 by Ruthie Ayoub RN  Outcome: Progressing  Flowsheets (Taken 5/4/2024 1945)  Promote/optimize nutrition: Monitor/record intake including meals  Goal: Promote skin healing  5/5/2024 0030 by Ruthie Ayoub RN  Outcome: Progressing  Flowsheets (Taken 5/4/2024 1945)  Promote skin healing:   Assess skin/pad under line(s)/device(s)   Protective dressings over bony prominences   Turn/reposition every 2 hours/use  positioning/transfer devices  5/5/2024 0029 by Ruthie Ayoub RN  Outcome: Progressing  Flowsheets (Taken 5/4/2024 1945)  Promote skin healing:   Assess skin/pad under line(s)/device(s)   Protective dressings over bony prominences   Turn/reposition every 2 hours/use positioning/transfer devices     Problem: Pain  Goal: Takes deep breaths with improved pain control throughout the shift  5/5/2024 0030 by Ruthie Ayoub RN  Outcome: Progressing  5/5/2024 0029 by Ruthie Ayoub RN  Outcome: Progressing  Goal: Turns in bed with improved pain control throughout the shift  5/5/2024 0030 by Ruthie Ayoub RN  Outcome: Progressing  5/5/2024 0029 by Ruthie Ayoub RN  Outcome: Progressing  Goal: Walks with improved pain control throughout the shift  5/5/2024 0030 by Ruthie Ayoub, RN  Outcome: Progressing  5/5/2024 0029 by Ruthie Ayoub RN  Outcome: Progressing  Goal: Performs ADL's with improved pain control throughout shift  5/5/2024 0030 by Ruthie Ayoub, RN  Outcome: Progressing  5/5/2024 0029 by Ruthie Ayoub RN  Outcome: Progressing  Goal: Participates in PT with improved pain control throughout the shift  5/5/2024 0030 by Ruthie Ayoub, RN  Outcome: Progressing  5/5/2024 0029 by Ruthie Ayoub RN  Outcome: Progressing  Goal: Free from opioid side effects throughout the shift  5/5/2024 0030 by Ruthie Ayoub RN  Outcome: Progressing  5/5/2024 0029 by Ruthie Ayoub RN  Outcome: Progressing  Goal: Free from acute confusion related to pain meds throughout the shift  5/5/2024 0030 by Ruthie Ayoub, RN  Outcome: Progressing  5/5/2024 0029 by Ruthie Ayoub RN  Outcome: Progressing     Problem: Fall/Injury  Goal: Not fall by end of shift  5/5/2024 0030 by Ruthie Ayoub RN  Outcome: Progressing  5/5/2024 0029 by Ruthie Ayoub RN  Outcome: Progressing  Goal: Be free from  injury by end of the shift  5/5/2024 0030 by Ruthie Ayoub RN  Outcome: Progressing  5/5/2024 0029 by Ruthie Ayoub RN  Outcome: Progressing  Goal: Verbalize understanding of personal risk factors for fall in the hospital  5/5/2024 0030 by Ruthie Ayoub RN  Outcome: Progressing  5/5/2024 0029 by Ruthie Ayoub RN  Outcome: Progressing  Goal: Verbalize understanding of risk factor reduction measures to prevent injury from fall in the home  5/5/2024 0030 by Ruthie Ayoub RN  Outcome: Progressing  5/5/2024 0029 by Ruthie Ayoub RN  Outcome: Progressing  Goal: Pace activities to prevent fatigue by end of the shift  5/5/2024 0030 by Ruthie Ayoub RN  Outcome: Progressing  5/5/2024 0029 by Ruthie Ayoub RN  Outcome: Progressing  Goal: Use assistive devices by end of the shift  5/5/2024 0030 by Ruthie Ayoub RN  Outcome: Progressing  5/5/2024 0029 by Ruthie Ayoub RN  Outcome: Progressing   The patient's goals for the shift include      The clinical goals for the shift include Patient will have adequate pain control    Over the shift, the patient did not make progress toward the following goals. Barriers to progression include . Recommendations to address these barriers include .

## 2024-05-05 NOTE — CARE PLAN
Problem: Pain - Adult  Goal: Verbalizes/displays adequate comfort level or baseline comfort level  Outcome: Progressing     Problem: Safety - Adult  Goal: Free from fall injury  Outcome: Progressing     Problem: Discharge Planning  Goal: Discharge to home or other facility with appropriate resources  Outcome: Progressing     Problem: Chronic Conditions and Co-morbidities  Goal: Patient's chronic conditions and co-morbidity symptoms are monitored and maintained or improved  Outcome: Progressing     Problem: Skin  Goal: Decreased wound size/increased tissue granulation at next dressing change  Outcome: Progressing  Goal: Participates in plan/prevention/treatment measures  Outcome: Progressing  Goal: Prevent/manage excess moisture  Outcome: Progressing  Goal: Prevent/minimize sheer/friction injuries  Outcome: Progressing  Goal: Promote/optimize nutrition  Outcome: Progressing  Goal: Promote skin healing  Outcome: Progressing     Problem: Pain  Goal: Takes deep breaths with improved pain control throughout the shift  Outcome: Progressing  Goal: Turns in bed with improved pain control throughout the shift  Outcome: Progressing  Goal: Walks with improved pain control throughout the shift  Outcome: Progressing  Goal: Performs ADL's with improved pain control throughout shift  Outcome: Progressing  Goal: Participates in PT with improved pain control throughout the shift  Outcome: Progressing  Goal: Free from opioid side effects throughout the shift  Outcome: Progressing  Goal: Free from acute confusion related to pain meds throughout the shift  Outcome: Progressing     Problem: Fall/Injury  Goal: Not fall by end of shift  Outcome: Progressing  Goal: Be free from injury by end of the shift  Outcome: Progressing  Goal: Verbalize understanding of personal risk factors for fall in the hospital  Outcome: Progressing  Goal: Verbalize understanding of risk factor reduction measures to prevent injury from fall in the  home  Outcome: Progressing  Goal: Pace activities to prevent fatigue by end of the shift  Outcome: Progressing  Goal: Use assistive devices by end of the shift  Outcome: Progressing   The patient's goals for the shift include      The clinical goals for the shift include Patient will have adequate pain control    Over the shift, the patient did not make progress toward the following goals. Barriers to progression include . Recommendations to address these barriers include .

## 2024-05-05 NOTE — CARE PLAN
The patient's goals for the shift include      The clinical goals for the shift include Pain Free, No Falls        Problem: Pain - Adult  Goal: Verbalizes/displays adequate comfort level or baseline comfort level  Outcome: Progressing     Problem: Safety - Adult  Goal: Free from fall injury  Outcome: Progressing     Problem: Discharge Planning  Goal: Discharge to home or other facility with appropriate resources  Outcome: Progressing     Problem: Chronic Conditions and Co-morbidities  Goal: Patient's chronic conditions and co-morbidity symptoms are monitored and maintained or improved  Outcome: Progressing     Problem: Skin  Goal: Decreased wound size/increased tissue granulation at next dressing change  Outcome: Progressing  Goal: Participates in plan/prevention/treatment measures  Outcome: Progressing  Goal: Prevent/manage excess moisture  Outcome: Progressing  Goal: Prevent/minimize sheer/friction injuries  Outcome: Progressing  Goal: Promote/optimize nutrition  Outcome: Progressing  Goal: Promote skin healing  Outcome: Progressing     Problem: Pain  Goal: Takes deep breaths with improved pain control throughout the shift  Outcome: Progressing  Goal: Turns in bed with improved pain control throughout the shift  Outcome: Progressing  Goal: Walks with improved pain control throughout the shift  Outcome: Progressing  Goal: Performs ADL's with improved pain control throughout shift  Outcome: Progressing  Goal: Participates in PT with improved pain control throughout the shift  Outcome: Progressing  Goal: Free from opioid side effects throughout the shift  Outcome: Progressing  Goal: Free from acute confusion related to pain meds throughout the shift  Outcome: Progressing     Problem: Fall/Injury  Goal: Not fall by end of shift  Outcome: Progressing  Goal: Be free from injury by end of the shift  Outcome: Progressing  Goal: Verbalize understanding of personal risk factors for fall in the hospital  Outcome:  Progressing  Goal: Verbalize understanding of risk factor reduction measures to prevent injury from fall in the home  Outcome: Progressing  Goal: Pace activities to prevent fatigue by end of the shift  Outcome: Progressing  Goal: Use assistive devices by end of the shift  Outcome: Progressing

## 2024-05-05 NOTE — PROGRESS NOTES
Batsheva Page is a 48 y.o. female on day 7 of admission presenting with Sepsis, due to unspecified organism, unspecified whether acute organ dysfunction present (Multi).    Subjective   Symptoms (0 - 10, Best to Worst)  [facility] Symptom Assessment System  Pain Score: 2  No acute events overnight. Pain controlled on current regimen. Some discomfort from guidewire L CW.        Objective     Physical Exam  Vitals and nursing note reviewed.   Constitutional:       General: She is not in acute distress.     Appearance: She is not ill-appearing.   HENT:      Head: Normocephalic and atraumatic.      Nose: Nose normal.      Mouth/Throat:      Mouth: Mucous membranes are moist.      Pharynx: Oropharynx is clear.   Eyes:      Extraocular Movements: Extraocular movements intact.      Pupils: Pupils are equal, round, and reactive to light.   Cardiovascular:      Rate and Rhythm: Normal rate and regular rhythm.      Pulses: Normal pulses.      Heart sounds: No murmur heard.     Comments: LCW guidewire with dressing intact.   Pulmonary:      Effort: Pulmonary effort is normal. No respiratory distress.      Breath sounds: Normal breath sounds.   Abdominal:      General: Abdomen is flat. Bowel sounds are normal. There is no distension.      Palpations: Abdomen is soft.      Tenderness: There is no abdominal tenderness.   Musculoskeletal:         General: No swelling, tenderness, deformity or signs of injury. Normal range of motion.      Cervical back: Normal range of motion and neck supple.   Skin:     General: Skin is warm and dry.      Capillary Refill: Capillary refill takes 2 to 3 seconds.   Neurological:      General: No focal deficit present.      Mental Status: She is alert and oriented to person, place, and time.   Psychiatric:         Mood and Affect: Mood normal.         Last Recorded Vitals  Blood pressure 90/54, pulse 81, temperature 36.9 °C (98.4 °F), temperature source Oral, resp. rate 17, height 1.727 m (5'  "8\"), weight 64.8 kg (142 lb 13.7 oz), SpO2 95%.  Intake/Output last 3 Shifts:  I/O last 3 completed shifts:  In: 820 (12.7 mL/kg) [P.O.:820]  Out: 1 (0 mL/kg) [Urine:1 (0 mL/kg/hr)]  Weight: 64.8 kg     Relevant Results  Results for orders placed or performed during the hospital encounter of 04/27/24 (from the past 24 hour(s))   CBC and Auto Differential   Result Value Ref Range    WBC 5.3 4.4 - 11.3 x10*3/uL    nRBC 0.0 0.0 - 0.0 /100 WBCs    RBC 3.56 (L) 4.00 - 5.20 x10*6/uL    Hemoglobin 8.9 (L) 12.0 - 16.0 g/dL    Hematocrit 30.6 (L) 36.0 - 46.0 %    MCV 86 80 - 100 fL    MCH 25.0 (L) 26.0 - 34.0 pg    MCHC 29.1 (L) 32.0 - 36.0 g/dL    RDW 19.4 (H) 11.5 - 14.5 %    Platelets 285 150 - 450 x10*3/uL    Neutrophils % 65.4 40.0 - 80.0 %    Immature Granulocytes %, Automated 1.3 (H) 0.0 - 0.9 %    Lymphocytes % 19.4 13.0 - 44.0 %    Monocytes % 6.3 2.0 - 10.0 %    Eosinophils % 7.2 0.0 - 6.0 %    Basophils % 0.4 0.0 - 2.0 %    Neutrophils Absolute 3.45 1.20 - 7.70 x10*3/uL    Immature Granulocytes Absolute, Automated 0.07 0.00 - 0.70 x10*3/uL    Lymphocytes Absolute 1.02 (L) 1.20 - 4.80 x10*3/uL    Monocytes Absolute 0.33 0.10 - 1.00 x10*3/uL    Eosinophils Absolute 0.38 0.00 - 0.70 x10*3/uL    Basophils Absolute 0.02 0.00 - 0.10 x10*3/uL   Basic Metabolic Panel   Result Value Ref Range    Glucose 104 (H) 65 - 99 mg/dL    Sodium 137 133 - 145 mmol/L    Potassium 3.8 3.4 - 5.1 mmol/L    Chloride 96 (L) 97 - 107 mmol/L    Bicarbonate 19 (L) 24 - 31 mmol/L    Urea Nitrogen 41 (H) 8 - 25 mg/dL    Creatinine 8.80 (H) 0.40 - 1.60 mg/dL    eGFR 5 (L) >60 mL/min/1.73m*2    Calcium 7.8 (L) 8.5 - 10.4 mg/dL    Anion Gap >19 (H) <=19 mmol/L          Assessment/Plan   IMP:    MRSA septicemia - 2/2 HD cath infected line. ID is following. LAURA negative vegetation. Guidewire in place for line holiday.   ESRD on HD - dialysis Tues/Thurs/Sat, Dr. Pedersen following  Chronic pain - prn dilaudid has been effective per the patient adequately " controlled  Anxiety -   Valve disease - history of replacement aortic and mitral valves     Palliative Care Encounter  DNR-CCA-DNI  Pt is capable decision maker.  Daughter, Xiao is hc-POA but we do not have this documentation in record.     5/3  Treatment model of care, pain improved with dilaudid.  following for anxiety. No discharge plans at this time. NO changes to plan from palliative perspective.     5/4  Pain controlled,  following for anxiety. IR to address meds during catheter replacement for anxiety. Patient did again state that she has been through so much and is not sure how much more she can handle. We discussed, and she is not ready at this time to consider hospice involvement but stated that she feels that her goals are slowly starting to focus more on quality of life and symptom control. She wants to continue all treatments at this time however.     5/5  Symptoms stable currently. Goals established, no acute palliative needs, Will sign off at this time.     I spent 20 minutes in the professional and overall care of this patient.      Elzbieta Wood, APRN-CNP

## 2024-05-06 ENCOUNTER — APPOINTMENT (OUTPATIENT)
Dept: CARDIOLOGY | Facility: HOSPITAL | Age: 49
DRG: 314 | End: 2024-05-06
Payer: MEDICARE

## 2024-05-06 LAB
ANION GAP SERPL CALC-SCNC: >19 MMOL/L
BACTERIA CATH TIP CULT: ABNORMAL
BASOPHILS # BLD AUTO: 0.02 X10*3/UL (ref 0–0.1)
BASOPHILS NFR BLD AUTO: 0.3 %
BUN SERPL-MCNC: 55 MG/DL (ref 8–25)
CALCIUM SERPL-MCNC: 7.8 MG/DL (ref 8.5–10.4)
CHLORIDE SERPL-SCNC: 97 MMOL/L (ref 97–107)
CO2 SERPL-SCNC: 19 MMOL/L (ref 24–31)
CREAT SERPL-MCNC: 10.3 MG/DL (ref 0.4–1.6)
EGFRCR SERPLBLD CKD-EPI 2021: 4 ML/MIN/1.73M*2
EOSINOPHIL # BLD AUTO: 0.73 X10*3/UL (ref 0–0.7)
EOSINOPHIL NFR BLD AUTO: 11.9 %
ERYTHROCYTE [DISTWIDTH] IN BLOOD BY AUTOMATED COUNT: 19.5 % (ref 11.5–14.5)
GLUCOSE SERPL-MCNC: 102 MG/DL (ref 65–99)
HCT VFR BLD AUTO: 30 % (ref 36–46)
HGB BLD-MCNC: 8.8 G/DL (ref 12–16)
IMM GRANULOCYTES # BLD AUTO: 0.05 X10*3/UL (ref 0–0.7)
IMM GRANULOCYTES NFR BLD AUTO: 0.8 % (ref 0–0.9)
LYMPHOCYTES # BLD AUTO: 1.21 X10*3/UL (ref 1.2–4.8)
LYMPHOCYTES NFR BLD AUTO: 19.7 %
MCH RBC QN AUTO: 25.2 PG (ref 26–34)
MCHC RBC AUTO-ENTMCNC: 29.3 G/DL (ref 32–36)
MCV RBC AUTO: 86 FL (ref 80–100)
MONOCYTES # BLD AUTO: 0.32 X10*3/UL (ref 0.1–1)
MONOCYTES NFR BLD AUTO: 5.2 %
NEUTROPHILS # BLD AUTO: 3.8 X10*3/UL (ref 1.2–7.7)
NEUTROPHILS NFR BLD AUTO: 62.1 %
NRBC BLD-RTO: 0 /100 WBCS (ref 0–0)
PLATELET # BLD AUTO: 331 X10*3/UL (ref 150–450)
POTASSIUM SERPL-SCNC: 4 MMOL/L (ref 3.4–5.1)
RBC # BLD AUTO: 3.49 X10*6/UL (ref 4–5.2)
SODIUM SERPL-SCNC: 137 MMOL/L (ref 133–145)
VANCOMYCIN SERPL-MCNC: 26 UG/ML (ref 10–20)
WBC # BLD AUTO: 6.1 X10*3/UL (ref 4.4–11.3)

## 2024-05-06 PROCEDURE — 2720000007 HC OR 272 NO HCPCS

## 2024-05-06 PROCEDURE — 2500000001 HC RX 250 WO HCPCS SELF ADMINISTERED DRUGS (ALT 637 FOR MEDICARE OP): Performed by: STUDENT IN AN ORGANIZED HEALTH CARE EDUCATION/TRAINING PROGRAM

## 2024-05-06 PROCEDURE — C1894 INTRO/SHEATH, NON-LASER: HCPCS

## 2024-05-06 PROCEDURE — 2550000001 HC RX 255 CONTRASTS: Performed by: RADIOLOGY

## 2024-05-06 PROCEDURE — 2500000005 HC RX 250 GENERAL PHARMACY W/O HCPCS: Performed by: RADIOLOGY

## 2024-05-06 PROCEDURE — 6350000001 HC RX 635 EPOETIN >10,000 UNITS: Performed by: INTERNAL MEDICINE

## 2024-05-06 PROCEDURE — 2500000004 HC RX 250 GENERAL PHARMACY W/ HCPCS (ALT 636 FOR OP/ED): Performed by: HOSPITALIST

## 2024-05-06 PROCEDURE — 1200000002 HC GENERAL ROOM WITH TELEMETRY DAILY

## 2024-05-06 PROCEDURE — 37248 TRLUML BALO ANGIOP 1ST VEIN: CPT

## 2024-05-06 PROCEDURE — 75820 VEIN X-RAY ARM/LEG: CPT

## 2024-05-06 PROCEDURE — 2500000004 HC RX 250 GENERAL PHARMACY W/ HCPCS (ALT 636 FOR OP/ED): Performed by: STUDENT IN AN ORGANIZED HEALTH CARE EDUCATION/TRAINING PROGRAM

## 2024-05-06 PROCEDURE — 2500000004 HC RX 250 GENERAL PHARMACY W/ HCPCS (ALT 636 FOR OP/ED): Performed by: RADIOLOGY

## 2024-05-06 PROCEDURE — 99153 MOD SED SAME PHYS/QHP EA: CPT

## 2024-05-06 PROCEDURE — 77001 FLUOROGUIDE FOR VEIN DEVICE: CPT

## 2024-05-06 PROCEDURE — 99152 MOD SED SAME PHYS/QHP 5/>YRS: CPT

## 2024-05-06 PROCEDURE — 85025 COMPLETE CBC W/AUTO DIFF WBC: CPT | Performed by: HOSPITALIST

## 2024-05-06 PROCEDURE — 2500000001 HC RX 250 WO HCPCS SELF ADMINISTERED DRUGS (ALT 637 FOR MEDICARE OP): Performed by: HOSPITALIST

## 2024-05-06 PROCEDURE — 36415 COLL VENOUS BLD VENIPUNCTURE: CPT | Performed by: HOSPITALIST

## 2024-05-06 PROCEDURE — 80202 ASSAY OF VANCOMYCIN: CPT | Performed by: HOSPITALIST

## 2024-05-06 PROCEDURE — 36581 REPLACE TUNNELED CV CATH: CPT | Performed by: RADIOLOGY

## 2024-05-06 PROCEDURE — 80048 BASIC METABOLIC PNL TOTAL CA: CPT | Performed by: HOSPITALIST

## 2024-05-06 PROCEDURE — C1725 CATH, TRANSLUMIN NON-LASER: HCPCS

## 2024-05-06 PROCEDURE — C1769 GUIDE WIRE: HCPCS

## 2024-05-06 PROCEDURE — 75820 VEIN X-RAY ARM/LEG: CPT | Performed by: RADIOLOGY

## 2024-05-06 PROCEDURE — 99232 SBSQ HOSP IP/OBS MODERATE 35: CPT

## 2024-05-06 RX ORDER — LIDOCAINE HYDROCHLORIDE 10 MG/ML
INJECTION, SOLUTION EPIDURAL; INFILTRATION; INTRACAUDAL; PERINEURAL AS NEEDED
Status: DISCONTINUED | OUTPATIENT
Start: 2024-05-06 | End: 2024-05-08 | Stop reason: HOSPADM

## 2024-05-06 RX ADMIN — DIPHENHYDRAMINE HYDROCHLORIDE 25 MG: 50 INJECTION, SOLUTION INTRAMUSCULAR; INTRAVENOUS at 08:02

## 2024-05-06 RX ADMIN — MIDAZOLAM 2 MG: 1 INJECTION INTRAMUSCULAR; INTRAVENOUS at 16:04

## 2024-05-06 RX ADMIN — Medication 3 L/MIN: at 15:07

## 2024-05-06 RX ADMIN — MIRTAZAPINE 15 MG: 15 TABLET, FILM COATED ORAL at 20:38

## 2024-05-06 RX ADMIN — HYDROMORPHONE HYDROCHLORIDE 0.2 MG: 1 INJECTION, SOLUTION INTRAMUSCULAR; INTRAVENOUS; SUBCUTANEOUS at 21:11

## 2024-05-06 RX ADMIN — MIDAZOLAM 1 MG: 1 INJECTION INTRAMUSCULAR; INTRAVENOUS at 15:12

## 2024-05-06 RX ADMIN — DIPHENHYDRAMINE HYDROCHLORIDE 50 MG: 50 INJECTION, SOLUTION INTRAMUSCULAR; INTRAVENOUS at 15:07

## 2024-05-06 RX ADMIN — MIDAZOLAM 1 MG: 1 INJECTION INTRAMUSCULAR; INTRAVENOUS at 15:57

## 2024-05-06 RX ADMIN — HYDRALAZINE HYDROCHLORIDE 50 MG: 50 TABLET ORAL at 20:38

## 2024-05-06 RX ADMIN — DIPHENHYDRAMINE HYDROCHLORIDE 25 MG: 50 INJECTION, SOLUTION INTRAMUSCULAR; INTRAVENOUS at 21:11

## 2024-05-06 RX ADMIN — MIDAZOLAM 1 MG: 1 INJECTION INTRAMUSCULAR; INTRAVENOUS at 15:25

## 2024-05-06 RX ADMIN — FENTANYL CITRATE 50 MCG: 50 INJECTION, SOLUTION INTRAMUSCULAR; INTRAVENOUS at 15:21

## 2024-05-06 RX ADMIN — DIPHENHYDRAMINE HYDROCHLORIDE 25 MG: 50 INJECTION, SOLUTION INTRAMUSCULAR; INTRAVENOUS at 15:21

## 2024-05-06 RX ADMIN — HYDROMORPHONE HYDROCHLORIDE 0.2 MG: 1 INJECTION, SOLUTION INTRAMUSCULAR; INTRAVENOUS; SUBCUTANEOUS at 17:05

## 2024-05-06 RX ADMIN — ATORVASTATIN CALCIUM 40 MG: 40 TABLET, FILM COATED ORAL at 20:38

## 2024-05-06 RX ADMIN — HYDROMORPHONE HYDROCHLORIDE 0.2 MG: 1 INJECTION, SOLUTION INTRAMUSCULAR; INTRAVENOUS; SUBCUTANEOUS at 03:42

## 2024-05-06 RX ADMIN — HYDROMORPHONE HYDROCHLORIDE 0.2 MG: 1 INJECTION, SOLUTION INTRAMUSCULAR; INTRAVENOUS; SUBCUTANEOUS at 08:03

## 2024-05-06 RX ADMIN — DIPHENHYDRAMINE HYDROCHLORIDE 50 MG: 50 INJECTION, SOLUTION INTRAMUSCULAR; INTRAVENOUS at 16:12

## 2024-05-06 RX ADMIN — MIDAZOLAM 3 MG: 1 INJECTION INTRAMUSCULAR; INTRAVENOUS at 15:06

## 2024-05-06 RX ADMIN — RIFAMPIN 300 MG: 300 CAPSULE ORAL at 21:12

## 2024-05-06 RX ADMIN — FENTANYL CITRATE 50 MCG: 50 INJECTION, SOLUTION INTRAMUSCULAR; INTRAVENOUS at 15:06

## 2024-05-06 RX ADMIN — IODIXANOL 10 ML: 320 INJECTION, SOLUTION INTRAVASCULAR at 16:00

## 2024-05-06 RX ADMIN — DIPHENHYDRAMINE HYDROCHLORIDE 25 MG: 50 INJECTION, SOLUTION INTRAMUSCULAR; INTRAVENOUS at 15:30

## 2024-05-06 RX ADMIN — FENTANYL CITRATE 50 MCG: 50 INJECTION, SOLUTION INTRAMUSCULAR; INTRAVENOUS at 15:44

## 2024-05-06 RX ADMIN — CLONIDINE HYDROCHLORIDE 0.1 MG: 0.1 TABLET ORAL at 17:05

## 2024-05-06 RX ADMIN — METOPROLOL TARTRATE 25 MG: 25 TABLET, FILM COATED ORAL at 20:39

## 2024-05-06 RX ADMIN — FENTANYL CITRATE 50 MCG: 50 INJECTION, SOLUTION INTRAMUSCULAR; INTRAVENOUS at 15:12

## 2024-05-06 RX ADMIN — LIDOCAINE HYDROCHLORIDE 10 ML: 10 INJECTION, SOLUTION EPIDURAL; INFILTRATION; INTRACAUDAL; PERINEURAL at 15:15

## 2024-05-06 RX ADMIN — DIPHENHYDRAMINE HYDROCHLORIDE 25 MG: 50 INJECTION, SOLUTION INTRAMUSCULAR; INTRAVENOUS at 03:43

## 2024-05-06 RX ADMIN — PREGABALIN 50 MG: 50 CAPSULE ORAL at 20:38

## 2024-05-06 ASSESSMENT — COGNITIVE AND FUNCTIONAL STATUS - GENERAL
STANDING UP FROM CHAIR USING ARMS: A LITTLE
DAILY ACTIVITIY SCORE: 24
WALKING IN HOSPITAL ROOM: A LITTLE
MOBILITY SCORE: 21
CLIMB 3 TO 5 STEPS WITH RAILING: A LITTLE

## 2024-05-06 ASSESSMENT — PAIN - FUNCTIONAL ASSESSMENT
PAIN_FUNCTIONAL_ASSESSMENT: 0-10
PAIN_FUNCTIONAL_ASSESSMENT: 0-10
PAIN_FUNCTIONAL_ASSESSMENT: FLACC (FACE, LEGS, ACTIVITY, CRY, CONSOLABILITY)
PAIN_FUNCTIONAL_ASSESSMENT: 0-10
PAIN_FUNCTIONAL_ASSESSMENT: 0-10
PAIN_FUNCTIONAL_ASSESSMENT: FLACC (FACE, LEGS, ACTIVITY, CRY, CONSOLABILITY)

## 2024-05-06 ASSESSMENT — PAIN DESCRIPTION - DESCRIPTORS
DESCRIPTORS: ACHING
DESCRIPTORS: ACHING

## 2024-05-06 ASSESSMENT — PAIN DESCRIPTION - LOCATION
LOCATION: OTHER (COMMENT)
LOCATION: CHEST
LOCATION: OTHER (COMMENT)

## 2024-05-06 ASSESSMENT — PAIN SCALES - GENERAL
PAINLEVEL_OUTOF10: 10 - WORST POSSIBLE PAIN
PAINLEVEL_OUTOF10: 10 - WORST POSSIBLE PAIN
PAINLEVEL_OUTOF10: 4
PAINLEVEL_OUTOF10: 2
PAINLEVEL_OUTOF10: 3
PAINLEVEL_OUTOF10: 10 - WORST POSSIBLE PAIN

## 2024-05-06 ASSESSMENT — PAIN SCALES - PAIN ASSESSMENT IN ADVANCED DEMENTIA (PAINAD): TOTALSCORE: MEDICATION (SEE MAR)

## 2024-05-06 ASSESSMENT — PAIN DESCRIPTION - ORIENTATION: ORIENTATION: LEFT

## 2024-05-06 NOTE — PROGRESS NOTES
Patient has been cleared by ID for new tunneled dialysis catheter. IR consulted for placement of catheter. Next HD tomorrow after catheter placement.    Vu Pedersen MD

## 2024-05-06 NOTE — PROGRESS NOTES
Batsheva Page is a 48 y.o. female on day 8 of admission presenting with Sepsis, due to unspecified organism, unspecified whether acute organ dysfunction present (Multi).      Subjective   States she feels okay this morning. Anxious to get her catheter replaced today. Tolerating PO but had decreased appetite yesterday. Last BM was 2 days ago. Does not feel constipated.        Objective     Last Recorded Vitals  BP (!) 109/91 (BP Location: Right leg, Patient Position: Lying)   Pulse 83   Temp 37.4 °C (99.3 °F) (Oral)   Resp 17   Wt 68.1 kg (150 lb 2.1 oz)   SpO2 99%   Intake/Output last 3 Shifts:    Intake/Output Summary (Last 24 hours) at 5/6/2024 1132  Last data filed at 5/6/2024 0815  Gross per 24 hour   Intake 0 ml   Output 0 ml   Net 0 ml       Admission Weight  Weight: 63.7 kg (140 lb 6.9 oz) (04/27/24 2039)    Daily Weight  05/06/24 : 68.1 kg (150 lb 2.1 oz)    Image Results  IR CVC exchange  Narrative: Interpreted By:  Chris Lott,   STUDY:  IR CVC EXCHANGE; 5/2/2024 4:13 pm      INDICATION:  Septic shock due to MRSA bacteremia. Referred for removal of tunneled  central venous catheter. Poor upper body central venous access.      COMPARISON:  None      ACCESSION NUMBER(S):  [id number]      ORDERING CLINICIAN:  MARCIO BECERRA      TECHNIQUE:  Conscious sedation: 30 minutes.  Exchange of tunneled central venous catheter.  Fluoroscopy time 1.1 minute, images 3, dose 4.59 mGy air kerma.      FINDINGS:  Informed consent obtained. Patient positioned supine. All elements of  maximal sterile barrier were utilized including cap, mask, sterile  gown, sterile gloves, large sterile drape, hand scrub and 2%  chlorhexidine for cleaning. Left upper chest wall and indwelling  catheter prepped and draped.      Indwelling tunneled central venous hemodialysis catheter was removed  over Amplatz wire. A 4 [place] Leija catheter was advanced over  the wire to retained access for subsequent reinsertion  of  hemodialysis catheter, few days after continued clear blood cultures.  This was secured with sutures and covered with sterile dressing. The  tip of the removed catheter was sent for culture. Patient tolerated  procedure well.      Impression: Removal of tunneled central venous dialysis catheter over wire. A  small caliber catheter was left within the tunneled tract/central  venous system to retain access for subsequent reinsertion of  hemodialysis catheter, given patient's lack of patent central venous  access via upper chest.          Signed by: Chris Lott 5/3/2024 8:20 AM  Dictation workstation:   [id number]      Physical Exam  Constitutional:       General: She is not in acute distress.     Appearance: She is not toxic-appearing.   HENT:      Head: Normocephalic.      Mouth/Throat:      Pharynx: Oropharynx is clear.   Eyes:      General: No scleral icterus.  Cardiovascular:      Rate and Rhythm: Normal rate.   Pulmonary:      Effort: No respiratory distress.      Breath sounds: No wheezing.   Abdominal:      General: There is no distension.      Palpations: Abdomen is soft.   Musculoskeletal:      Right lower leg: No edema.      Left lower leg: No edema.   Neurological:      Mental Status: She is alert.   Psychiatric:         Behavior: Behavior normal.         Relevant Results               Assessment/Plan        Principal Problem:    Sepsis, due to unspecified organism, unspecified whether acute organ dysfunction present (Multi)    MRSA bacteremia  Septic shock, resolved  Hx of recurrent MRSA bacteremia and infective endocarditis (s/p aortic valve 2020 and mitral 2013 and 2020 replacements)  ID following  LAURA negative for vegetations  IR consulted for permacath replacement today  Blood cultures 4/30/24 negative  Continue vancomycin and rifampin per ID    ESRD on HD  Nephrology following  Dr. Soto recommending peritoneal dialysis   Will defer further discussions to nephrology    SVT  Continue  lopressor    Myalgias  Continue dilaudid PRN    Pruritus   Continue benadryl, claritin, and topicals    Anemia of chronic renal disease  H&H stable  No overt signs of bleeding  Transfuse Hgb <7    Depression  Continue bupropion    HTN  Continue clonidine, hydralazine, and lopressor      Dispo: awaiting permacath replacement by IR today. Will need dialysis following line replacement. Awaiting final antibiotic recommendations. Anticipate discharge home +/- C when medically cleared.             Keeley Murdock MD

## 2024-05-06 NOTE — PROGRESS NOTES
Vancomycin Dosing by Pharmacy- FOLLOW-UP (HEMODIALYSIS)    Batsheva Page is a 48 y.o. year old female who Pharmacy has been consulted for vancomycin dosing for Vancomycin Indications: Other: infected dialysis catheter . Based on the patient's indication and renal status this patient will be dosed based on a pre-HD level of 20-25 mcg/mL.     Patient is currently on hemodialysis.    Vancomycin maintenance dose: 750 mg after each dialysis session User Schedule (TBD)- usually Tues, Thurs, Sat but HD scheduled for Mon     Vancomycin pre-HD level 26    Lab Results   Component Value Date    VANCORANDOM 26.0 (H) 05/06/2024    VANCOTROUGH 27.9 (HH) 09/26/2022       Visit Vitals  BP (!) 109/91 (BP Location: Right leg, Patient Position: Lying)   Pulse 83   Temp 37.4 °C (99.3 °F) (Oral)   Resp 17        Lab Results   Component Value Date    CREATININE 10.30 (H) 05/06/2024    CREATININE 8.80 (H) 05/05/2024    CREATININE 6.70 (H) 05/04/2024    CREATININE 4.20 (H) 05/03/2024       I/O last 3 completed shifts:  In: 220 (3.2 mL/kg) [P.O.:220]  Out: 1 (0 mL/kg) [Urine:1 (0 mL/kg/hr)]  Weight: 68.1 kg     Assessment/Plan     Above goal random/trough level. Vancomycin will be held until next random level is obtained.  Pre-HD level will be obtained prior to next HD (possibly Tuesday)?. May be obtained sooner if clinically indicated. If level is above goal, random level with AM labs the next morning will be obtained.   Will continue to monitor renal function daily while on vancomycin and order serum creatinine at least every 48 hours if not already ordered.  Follow for continued vancomycin needs, clinical response, and signs/symptoms of toxicity.     BROCK LANDIS, PharmD

## 2024-05-06 NOTE — PROGRESS NOTES
"BRIEF  ID  CHART  REVIEW  NOTE      Chart reviewed  Vital signs stable  Patient is off the floor getting a new tunneled dialysis catheter    Anticipate that she will be discharged on a regimen of post-dialysis vancomycin 750 mg after every dialysis through 6/7 or 6/8, continuing the \"vancomycin lock\" and followed by the collection of 2 blood cultures on 6/14 or 6/15  Again, would urge the patient to consider beginning peritoneal dialysis during the next 6 weeks    Will reassess 5/7, please call sooner pralejandro Soto MD  [facility]  Office:  [phone]   "

## 2024-05-06 NOTE — CARE PLAN
The patient's goals for the shift include      The clinical goals for the shift include maintain hemodynamic stability

## 2024-05-06 NOTE — PROGRESS NOTES
"Batsheva Page is a 48 y.o. female on day 8 of admission presenting with Sepsis, due to unspecified organism, unspecified whether acute organ dysfunction present (Multi).      Subjective     The patient's charts have been reviewed, and the case has been discussed with the assigned RN. The patient getting a new dialysis catheter today, which means the patient may be going home after and that  has resulted in improved mood. The patient denies any suicidal or homicidal ideation (SI/HI) and auditory or visual hallucinations (AVH). Furthermore, their appetite and sleep are showing signs of improvement. The patient reports no side effects from the recent medication change. The weekend overall was good and uneventful.  We appreciate being involved in the patient's care. We will now sign off. Please feel free to reach out if there is anything else we can be off assistance.    Objective     Last Recorded Vitals  Blood pressure 167/50, pulse 80, temperature 37.1 °C (98.8 °F), temperature source Oral, resp. rate 18, height 1.727 m (5' 8\"), weight 68.1 kg (150 lb 2.1 oz), SpO2 96%.    Review of Systems  Constitutional:  Positive for activity change and fatigue.   Gastrointestinal:  Positive for nausea.   Musculoskeletal:  Positive for arthralgias and myalgias.   Neurological:  Positive for seizures and weakness.   Psychiatric/Behavioral:  Positive for dysphoric mood and sleep disturbance. Negative for agitation, behavioral problems, confusion, decreased concentration, hallucinations, self-injury and suicidal ideas. The patient is nervous/anxious. The patient is not hyperactive.      Psychiatric ROS - Adult  Anxiety: decreased anxiety  Depression: energy   Delirium: negative  Psychosis: negative  Suzanne: negative  Safety Issues: none  Psychiatric ROS Comment: As noted      Mental Status Exam  General: alert  and awake in hospital attire  Appearance: Well groomed, appropriate eye contact.  Attitude/Behavior:Cooperative, " "conversant, engaged, and with good eye contact.  Motor Activity: No psychomotor agitation or retardation, no tremor or other abnormal movements.  Speech: normal rate, tone and rhythm  Mood: \"better  Affect: normal and mood-congruent  Thought Process: linear, goal directed and circumstantial  Thought Content: Within normal limits  Thought Perception: No perceptual abnormalities noted  Cognition: Cognitively intact to conversational testing with respect to attention, orientation, fund of knowledge, recent and remote memory, and language.  Insight: intact  Judgement: Intact     Psychiatric Risk Assessment  Violence Risk Assessment: none  Acute Risk of Harm to Others is Considered: low   Suicide Risk Assessment: , chronic medical illness, and chronic pain  Protective Factors against Suicide: adherence to  treatment, marriage/partnership, moral objections to suicide, positive family relationships, and sense of responsibility toward family  Acute Risk of Harm to Self is Considered: low    Current Medications    Scheduled medications  aspirin, 81 mg, oral, Daily  atorvastatin, 40 mg, oral, Nightly  calcitriol, 0.5 mcg, oral, Daily  cloNIDine, 0.1 mg, oral, q12h  epoetin brandy-epbx, 6,500 Units, subcutaneous, Once per day on Monday Wednesday Friday  ergocalciferol, 1,250 mcg, oral, Every Sunday  heparin (porcine), 5,000 Units, subcutaneous, q8h KIMBERLY  heparin, 1,000 Units, intra-catheter, After Dialysis  heparin, 1,000 Units, intra-catheter, After Dialysis  heparin, 1,000 Units, intra-catheter, After Dialysis  heparin, 1,000 Units, intra-catheter, After Dialysis  hydrALAZINE, 50 mg, oral, TID  levETIRAcetam, 500 mg, oral, Nightly  melatonin, 1 mg, oral, Nightly  metoprolol tartrate, 25 mg, oral, BID  mirtazapine, 15 mg, oral, Nightly  pregabalin, 50 mg, oral, BID  rifAMPin, 300 mg, oral, q8h      Continuous medications  oxygen, , Last Rate: 1 L/min (05/02/24 1521)      PRN medications  PRN medications: acetaminophen, " dextrose, dextrose, diphenhydrAMINE, diphenhydrAMINE, fentaNYL PF, glucagon, glucagon, HYDROmorphone, hydrOXYzine pamoate, loratadine, midazolam, oxyCODONE-acetaminophen, oxygen, polyethylene glycol, pramoxine, promethazine, vancomycin       Medication efficacy: Yes  Patient reporting any side effects? No  Any observed side effects? No      Relevant Results  Results for orders placed or performed during the hospital encounter of 04/27/24 (from the past 24 hour(s))   CBC and Auto Differential   Result Value Ref Range    WBC 6.1 4.4 - 11.3 x10*3/uL    nRBC 0.0 0.0 - 0.0 /100 WBCs    RBC 3.49 (L) 4.00 - 5.20 x10*6/uL    Hemoglobin 8.8 (L) 12.0 - 16.0 g/dL    Hematocrit 30.0 (L) 36.0 - 46.0 %    MCV 86 80 - 100 fL    MCH 25.2 (L) 26.0 - 34.0 pg    MCHC 29.3 (L) 32.0 - 36.0 g/dL    RDW 19.5 (H) 11.5 - 14.5 %    Platelets 331 150 - 450 x10*3/uL    Neutrophils % 62.1 40.0 - 80.0 %    Immature Granulocytes %, Automated 0.8 0.0 - 0.9 %    Lymphocytes % 19.7 13.0 - 44.0 %    Monocytes % 5.2 2.0 - 10.0 %    Eosinophils % 11.9 0.0 - 6.0 %    Basophils % 0.3 0.0 - 2.0 %    Neutrophils Absolute 3.80 1.20 - 7.70 x10*3/uL    Immature Granulocytes Absolute, Automated 0.05 0.00 - 0.70 x10*3/uL    Lymphocytes Absolute 1.21 1.20 - 4.80 x10*3/uL    Monocytes Absolute 0.32 0.10 - 1.00 x10*3/uL    Eosinophils Absolute 0.73 (H) 0.00 - 0.70 x10*3/uL    Basophils Absolute 0.02 0.00 - 0.10 x10*3/uL   Basic Metabolic Panel   Result Value Ref Range    Glucose 102 (H) 65 - 99 mg/dL    Sodium 137 133 - 145 mmol/L    Potassium 4.0 3.4 - 5.1 mmol/L    Chloride 97 97 - 107 mmol/L    Bicarbonate 19 (L) 24 - 31 mmol/L    Urea Nitrogen 55 (H) 8 - 25 mg/dL    Creatinine 10.30 (H) 0.40 - 1.60 mg/dL    eGFR 4 (L) >60 mL/min/1.73m*2    Calcium 7.8 (L) 8.5 - 10.4 mg/dL    Anion Gap >19 (H) <=19 mmol/L   Vancomycin   Result Value Ref Range    Vancomycin 26.0 (H) 10.0 - 20.0 ug/mL               Reviewed    Assessment/Plan   Principal Problem:    Sepsis, due  to unspecified organism, unspecified whether acute organ dysfunction present (Multi)    Plan/Recommendations     Depression              - Continue  Remeron 15 mg PO at bedtime   2.   Anxiety             - Continue Hydroxyzine 25 mg PO PRN every 8 hours  3.   Insomnia             - Continue Melatonin 1 mg PO at bedtime      The patient does not meet the criteria for the inpatient psychiatric treatment. Appreciate Discharge Dispo or next steps from the Care Coordinator. Thank you for allowing us to participate in the care of this patient. We will sign off for now pl,ease re consult if necessary.         I spent 25 minutes in the professional and overall care of this patient.    Parts of this chart have been completed using voice recognition software.  Please excuse any errors of transcription.  Despite the medical decision making time stamp, my medical decision making has taken place during the patient's entire visit.  Thought process and reason for plan has been formulated from the time that I saw the patient until the time of disposition and is not specific to one specific moment during their visit and furthermore the medical decision making encompasses the entire chart and not only that represented in this note.     Elva Mireles, APRN-CNP

## 2024-05-06 NOTE — PROGRESS NOTES
"Batsheva Page is a 48 y.o. female on day 8 of admission presenting with Sepsis, due to unspecified organism, unspecified whether acute organ dysfunction present (Multi).    Subjective   Patient seen for end-stage kidney disease patient is admitted with MRSA bacteremia he is on antibiotic therapy patient dialysis catheter was removed by interventional radiology patient is resting comfortably she has no new complaints for insertion of a new permacath today       Objective     Physical Exam  Neck:      Vascular: No carotid bruit.   Cardiovascular:      Rate and Rhythm: Normal rate and regular rhythm.      Heart sounds: Murmur heard.      No friction rub. No gallop.   Pulmonary:      Breath sounds: No wheezing, rhonchi or rales.   Chest:      Chest wall: No tenderness.   Abdominal:      General: There is no distension.      Tenderness: There is no abdominal tenderness. There is no guarding or rebound.   Musculoskeletal:         General: No swelling or tenderness.      Cervical back: Neck supple.      Right lower leg: No edema.      Left lower leg: No edema.   Lymphadenopathy:      Cervical: No cervical adenopathy.         Last Recorded Vitals  Blood pressure (!) 109/91, pulse 83, temperature 37.4 °C (99.3 °F), temperature source Oral, resp. rate 17, height 1.727 m (5' 8\"), weight 68.1 kg (150 lb 2.1 oz), SpO2 99%.    Intake/Output last 3 Shifts:  I/O last 3 completed shifts:  In: 220 (3.2 mL/kg) [P.O.:220]  Out: 1 (0 mL/kg) [Urine:1 (0 mL/kg/hr)]  Weight: 68.1 kg     Current Facility-Administered Medications:     acetaminophen (Tylenol) tablet 650 mg, 650 mg, oral, q6h PRN, Kinza Cruzich, DO, 650 mg at 05/03/24 0854    aspirin EC tablet 81 mg, 81 mg, oral, Daily, Kinzamatt Weiner, DO, 81 mg at 05/05/24 0909    atorvastatin (Lipitor) tablet 40 mg, 40 mg, oral, Nightly, Kinzamatt Weiner, DO, 40 mg at 05/05/24 2041    calcitriol (Rocaltrol) capsule 0.5 mcg, 0.5 mcg, oral, Daily, Kinza Weiner, DO, 0.5 " mcg at 05/05/24 0909    cloNIDine (Catapres) tablet 0.1 mg, 0.1 mg, oral, q12h, Kinza Weiner DO, 0.1 mg at 05/05/24 1812    dextrose 50 % injection 12.5 g, 12.5 g, intravenous, q15 min PRN, Kinza Weiner DO    dextrose 50 % injection 25 g, 25 g, intravenous, q15 min PRN, Kinza Weiner DO    diphenhydrAMINE (BENADryl) injection 25 mg, 25 mg, intravenous, q4h PRN, Kinza Weiner DO, 25 mg at 05/06/24 0802    diphenhydrAMINE (BENADryl) injection, , , PRN, Chris Lott MD, 50 mg at 05/02/24 1537    epoetin brandy-epbx (RETACRIT) injection 6,500 Units, 6,500 Units, subcutaneous, Once per day on Monday Wednesday Friday, Vu Pedersen MD, 6,500 Units at 05/03/24 1536    ergocalciferol (Vitamin D-2) capsule 1,250 mcg, 1,250 mcg, oral, Every Sunday, Kinza Weiner DO, 1,250 mcg at 05/05/24 0909    fentaNYL PF (Sublimaze) injection, , , PRN, Chris Lott MD, 50 mcg at 05/02/24 1538    glucagon (Glucagen) injection 1 mg, 1 mg, intramuscular, q15 min PRN, Kinza Weiner DO    glucagon (Glucagen) injection 1 mg, 1 mg, intramuscular, q15 min PRN, Kinza Weiner DO    heparin (porcine) injection 5,000 Units, 5,000 Units, subcutaneous, q8h KIMBERLY, Kinza Weiner DO, 5,000 Units at 05/05/24 0532    heparin 1,000 unit/mL injection 1,000 Units, 1,000 Units, intra-catheter, After Dialysis, Kinza Weiner DO, 2,100 Units at 05/02/24 1133    heparin 1,000 unit/mL injection 1,000 Units, 1,000 Units, intra-catheter, After Dialysis, Kinza Weiner DO, 2,100 Units at 05/02/24 1133    heparin 1,000 unit/mL injection 1,000 Units, 1,000 Units, intra-catheter, After Dialysis, Kinza Weiner DO    heparin 1,000 unit/mL injection 1,000 Units, 1,000 Units, intra-catheter, After Dialysis, Kinza Weiner DO    hydrALAZINE (Apresoline) tablet 50 mg, 50 mg, oral, TID, Kinza Weiner DO, 50 mg at 05/05/24 2042    HYDROmorphone (Dilaudid) injection 0.2 mg, 0.2 mg, intravenous, q4h PRN,  Kinza Weiner, DO, 0.2 mg at 05/06/24 0803    hydrOXYzine pamoate (Vistaril) capsule 25 mg, 25 mg, oral, q8h PRN, Kinza Weiner DO    levETIRAcetam (Keppra) tablet 500 mg, 500 mg, oral, Nightly, Kinza Weiner, DO, 500 mg at 05/04/24 2101    loratadine (Claritin) tablet 10 mg, 10 mg, oral, q48h PRN, Kinza Weiner DO    melatonin tablet 1 mg, 1 mg, oral, Nightly, Kinza Weiner, DO, 1 mg at 05/05/24 2041    metoprolol tartrate (Lopressor) tablet 25 mg, 25 mg, oral, BID, Kinza Weiner, , 25 mg at 05/05/24 2041    midazolam (Versed) injection, , , PRN, Chris Lott MD, 2 mg at 05/02/24 1543    mirtazapine (Remeron) tablet 15 mg, 15 mg, oral, Nightly, Kinza Weiner DO, 15 mg at 05/05/24 2042    oxyCODONE-acetaminophen (Percocet) 5-325 mg per tablet 1 tablet, 1 tablet, oral, q6h PRN, Kinza Weiner DO, 1 tablet at 05/02/24 0703    oxygen (O2) therapy, , , Continuous PRN, Chris Lott MD, Last Rate: 60,000 mL/hr at 05/02/24 1521, 1 L/min at 05/02/24 1521    polyethylene glycol (Glycolax, Miralax) packet 17 g, 17 g, oral, Daily PRN, Kinza Weiner DO    pramoxine (Sarna Sensitive) 1 % lotion, , Topical, q8h PRN, Kinza Weiner DO    pregabalin (Lyrica) capsule 50 mg, 50 mg, oral, BID, Kinza Weiner, , 50 mg at 05/05/24 2041    promethazine (Phenergan) tablet 25 mg, 25 mg, oral, Daily PRN, Kinza Weiner DO, 25 mg at 05/02/24 1223    rifAMPin (Rifadin) capsule 300 mg, 300 mg, oral, q8h, Kinza Weiner DO, 300 mg at 05/05/24 2300    vancomycin (Vancocin) pharmacy to dose - pharmacy monitoring, , miscellaneous, Daily PRN, Kinza Weiner DO   Relevant Results    Results for orders placed or performed during the hospital encounter of 04/27/24 (from the past 96 hour(s))   CBC and Auto Differential   Result Value Ref Range    WBC 6.6 4.4 - 11.3 x10*3/uL    nRBC 0.0 0.0 - 0.0 /100 WBCs    RBC 4.05 4.00 - 5.20 x10*6/uL    Hemoglobin 10.3 (L) 12.0 - 16.0  g/dL    Hematocrit 35.5 (L) 36.0 - 46.0 %    MCV 88 80 - 100 fL    MCH 25.4 (L) 26.0 - 34.0 pg    MCHC 29.0 (L) 32.0 - 36.0 g/dL    RDW 20.0 (H) 11.5 - 14.5 %    Platelets 231 150 - 450 x10*3/uL    Neutrophils % 72.2 40.0 - 80.0 %    Immature Granulocytes %, Automated 1.8 (H) 0.0 - 0.9 %    Lymphocytes % 13.6 13.0 - 44.0 %    Monocytes % 8.3 2.0 - 10.0 %    Eosinophils % 3.6 0.0 - 6.0 %    Basophils % 0.5 0.0 - 2.0 %    Neutrophils Absolute 4.80 1.20 - 7.70 x10*3/uL    Immature Granulocytes Absolute, Automated 0.12 0.00 - 0.70 x10*3/uL    Lymphocytes Absolute 0.90 (L) 1.20 - 4.80 x10*3/uL    Monocytes Absolute 0.55 0.10 - 1.00 x10*3/uL    Eosinophils Absolute 0.24 0.00 - 0.70 x10*3/uL    Basophils Absolute 0.03 0.00 - 0.10 x10*3/uL   Basic Metabolic Panel   Result Value Ref Range    Glucose 102 (H) 65 - 99 mg/dL    Sodium 138 133 - 145 mmol/L    Potassium 3.6 3.4 - 5.1 mmol/L    Chloride 98 97 - 107 mmol/L    Bicarbonate 23 (L) 24 - 31 mmol/L    Urea Nitrogen 13 8 - 25 mg/dL    Creatinine 4.20 (H) 0.40 - 1.60 mg/dL    eGFR 12 (L) >60 mL/min/1.73m*2    Calcium 8.4 (L) 8.5 - 10.4 mg/dL    Anion Gap 17 <=19 mmol/L   Magnesium   Result Value Ref Range    Magnesium 2.10 1.60 - 3.10 mg/dL   Phosphorus   Result Value Ref Range    Phosphorus 2.1 (L) 2.5 - 4.5 mg/dL   Sars-CoV-2 PCR   Result Value Ref Range    Coronavirus 2019, PCR Not Detected Not Detected   Vancomycin   Result Value Ref Range    Vancomycin 28.2 (H) 10.0 - 20.0 ug/mL   CBC and Auto Differential   Result Value Ref Range    WBC 5.7 4.4 - 11.3 x10*3/uL    nRBC 0.0 0.0 - 0.0 /100 WBCs    RBC 3.53 (L) 4.00 - 5.20 x10*6/uL    Hemoglobin 9.0 (L) 12.0 - 16.0 g/dL    Hematocrit 30.2 (L) 36.0 - 46.0 %    MCV 86 80 - 100 fL    MCH 25.5 (L) 26.0 - 34.0 pg    MCHC 29.8 (L) 32.0 - 36.0 g/dL    RDW 19.9 (H) 11.5 - 14.5 %    Platelets 250 150 - 450 x10*3/uL    Neutrophils % 70.0 40.0 - 80.0 %    Immature Granulocytes %, Automated 1.9 (H) 0.0 - 0.9 %    Lymphocytes % 17.9  13.0 - 44.0 %    Monocytes % 7.3 2.0 - 10.0 %    Eosinophils % 2.6 0.0 - 6.0 %    Basophils % 0.3 0.0 - 2.0 %    Neutrophils Absolute 4.01 1.20 - 7.70 x10*3/uL    Immature Granulocytes Absolute, Automated 0.11 0.00 - 0.70 x10*3/uL    Lymphocytes Absolute 1.03 (L) 1.20 - 4.80 x10*3/uL    Monocytes Absolute 0.42 0.10 - 1.00 x10*3/uL    Eosinophils Absolute 0.15 0.00 - 0.70 x10*3/uL    Basophils Absolute 0.02 0.00 - 0.10 x10*3/uL   Basic Metabolic Panel   Result Value Ref Range    Glucose 113 (H) 65 - 99 mg/dL    Sodium 136 133 - 145 mmol/L    Potassium 3.5 3.4 - 5.1 mmol/L    Chloride 98 97 - 107 mmol/L    Bicarbonate 20 (L) 24 - 31 mmol/L    Urea Nitrogen 30 (H) 8 - 25 mg/dL    Creatinine 6.70 (H) 0.40 - 1.60 mg/dL    eGFR 7 (L) >60 mL/min/1.73m*2    Calcium 7.7 (L) 8.5 - 10.4 mg/dL    Anion Gap 18 <=19 mmol/L   CBC and Auto Differential   Result Value Ref Range    WBC 5.3 4.4 - 11.3 x10*3/uL    nRBC 0.0 0.0 - 0.0 /100 WBCs    RBC 3.56 (L) 4.00 - 5.20 x10*6/uL    Hemoglobin 8.9 (L) 12.0 - 16.0 g/dL    Hematocrit 30.6 (L) 36.0 - 46.0 %    MCV 86 80 - 100 fL    MCH 25.0 (L) 26.0 - 34.0 pg    MCHC 29.1 (L) 32.0 - 36.0 g/dL    RDW 19.4 (H) 11.5 - 14.5 %    Platelets 285 150 - 450 x10*3/uL    Neutrophils % 65.4 40.0 - 80.0 %    Immature Granulocytes %, Automated 1.3 (H) 0.0 - 0.9 %    Lymphocytes % 19.4 13.0 - 44.0 %    Monocytes % 6.3 2.0 - 10.0 %    Eosinophils % 7.2 0.0 - 6.0 %    Basophils % 0.4 0.0 - 2.0 %    Neutrophils Absolute 3.45 1.20 - 7.70 x10*3/uL    Immature Granulocytes Absolute, Automated 0.07 0.00 - 0.70 x10*3/uL    Lymphocytes Absolute 1.02 (L) 1.20 - 4.80 x10*3/uL    Monocytes Absolute 0.33 0.10 - 1.00 x10*3/uL    Eosinophils Absolute 0.38 0.00 - 0.70 x10*3/uL    Basophils Absolute 0.02 0.00 - 0.10 x10*3/uL   Basic Metabolic Panel   Result Value Ref Range    Glucose 104 (H) 65 - 99 mg/dL    Sodium 137 133 - 145 mmol/L    Potassium 3.8 3.4 - 5.1 mmol/L    Chloride 96 (L) 97 - 107 mmol/L    Bicarbonate 19  (L) 24 - 31 mmol/L    Urea Nitrogen 41 (H) 8 - 25 mg/dL    Creatinine 8.80 (H) 0.40 - 1.60 mg/dL    eGFR 5 (L) >60 mL/min/1.73m*2    Calcium 7.8 (L) 8.5 - 10.4 mg/dL    Anion Gap >19 (H) <=19 mmol/L   CBC and Auto Differential   Result Value Ref Range    WBC 6.1 4.4 - 11.3 x10*3/uL    nRBC 0.0 0.0 - 0.0 /100 WBCs    RBC 3.49 (L) 4.00 - 5.20 x10*6/uL    Hemoglobin 8.8 (L) 12.0 - 16.0 g/dL    Hematocrit 30.0 (L) 36.0 - 46.0 %    MCV 86 80 - 100 fL    MCH 25.2 (L) 26.0 - 34.0 pg    MCHC 29.3 (L) 32.0 - 36.0 g/dL    RDW 19.5 (H) 11.5 - 14.5 %    Platelets 331 150 - 450 x10*3/uL    Neutrophils % 62.1 40.0 - 80.0 %    Immature Granulocytes %, Automated 0.8 0.0 - 0.9 %    Lymphocytes % 19.7 13.0 - 44.0 %    Monocytes % 5.2 2.0 - 10.0 %    Eosinophils % 11.9 0.0 - 6.0 %    Basophils % 0.3 0.0 - 2.0 %    Neutrophils Absolute 3.80 1.20 - 7.70 x10*3/uL    Immature Granulocytes Absolute, Automated 0.05 0.00 - 0.70 x10*3/uL    Lymphocytes Absolute 1.21 1.20 - 4.80 x10*3/uL    Monocytes Absolute 0.32 0.10 - 1.00 x10*3/uL    Eosinophils Absolute 0.73 (H) 0.00 - 0.70 x10*3/uL    Basophils Absolute 0.02 0.00 - 0.10 x10*3/uL   Basic Metabolic Panel   Result Value Ref Range    Glucose 102 (H) 65 - 99 mg/dL    Sodium 137 133 - 145 mmol/L    Potassium 4.0 3.4 - 5.1 mmol/L    Chloride 97 97 - 107 mmol/L    Bicarbonate 19 (L) 24 - 31 mmol/L    Urea Nitrogen 55 (H) 8 - 25 mg/dL    Creatinine 10.30 (H) 0.40 - 1.60 mg/dL    eGFR 4 (L) >60 mL/min/1.73m*2    Calcium 7.8 (L) 8.5 - 10.4 mg/dL    Anion Gap >19 (H) <=19 mmol/L   Vancomycin   Result Value Ref Range    Vancomycin 26.0 (H) 10.0 - 20.0 ug/mL       Assessment/Plan   End stage kidney disease patient to be dialyzed today after insertion of a new dialysis catheter by interventional radiology today  Hyperkalemia resolved  Septic shock improved going for removal of her dialysis catheter today continue antibiotics per infectious disease  Anemia of chronic kidney disease  Hypertension  Aortic  and mitral valve replacements                   Zhou Pedersen MD

## 2024-05-06 NOTE — HOSPITAL COURSE
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 Found to be hypotensive in the ED. Labs notable for leukocytosis, hyperkalemia, and elevated lactic acid. Admitted to the ICU for septic shock and severe hyperkalemia requiring emergent dialysis. Nephrology and ID were consulted. Blood cultures positive for MRSA. TTE (4/29) and LAURA (5/1) negative for endocarditis. Palliative care consulted - GOC and code status discussed - patient agreeable to aggressive treatment but does not want to undergo another valve/heart surgery ever again. DNRCCA, DNI, ok for ICU transfer with low threshold for hospice consult if she were to decline. Transferred out of the ICU on 5/2/24. Hospital course complicated by an episode of SVT which was treated with metoprolol. Given difficulty with vascular access, patient's left permacath was unable to be removed; however was exchanged by IR on 5/6/24. Repeat blood cultures NGTD. Given recurrent line infections, ID recommended switching from HD to peritoneal dialysis; however, patient remains unsure about this decision. Will discharge on IV vancomycin 750mg after every dialysis session through 6/7 or 6/8 with repeat blood cultures on 6/14 or 6/15 per ID recommendations.

## 2024-05-06 NOTE — PROGRESS NOTES
Music Therapy Note    Batsheva Page was referred by     Therapy Session  Referral Type: New referral this admission  Visit Type: New visit  Session Start Time: 1313  Conflict of Service: Off unit               Treatment/Interventions                 Education Documentation  No documentation found.

## 2024-05-06 NOTE — CARE PLAN
The clinical goals for the shift include maintain hemodynamic stability    Over the shift, no significant assessment changes were identified. Pain control was achieved through use of PRN medication as ordered. See MAR for details. No significant vital signs changes were assessed. The patient was NPO since midnight per order.  Over the shift, the patient did make progress toward the following goals.        Problem: Pain - Adult  Goal: Verbalizes/displays adequate comfort level or baseline comfort level  Outcome: Progressing     Problem: Safety - Adult  Goal: Free from fall injury  Outcome: Progressing     Problem: Discharge Planning  Goal: Discharge to home or other facility with appropriate resources  Outcome: Progressing     Problem: Skin  Goal: Promote skin healing  Outcome: Progressing     Problem: Pain  Goal: Free from opioid side effects throughout the shift  Outcome: Progressing  Goal: Free from acute confusion related to pain meds throughout the shift  Outcome: Progressing     Problem: Fall/Injury  Goal: Not fall by end of shift  Outcome: Progressing  Goal: Be free from injury by end of the shift  Outcome: Progressing  Goal: Verbalize understanding of personal risk factors for fall in the hospital  Outcome: Progressing

## 2024-05-07 ENCOUNTER — ANESTHESIA (OUTPATIENT)
Dept: CARDIOLOGY | Facility: HOSPITAL | Age: 49
DRG: 314 | End: 2024-05-07
Payer: MEDICARE

## 2024-05-07 ENCOUNTER — APPOINTMENT (OUTPATIENT)
Dept: CARDIOLOGY | Facility: HOSPITAL | Age: 49
DRG: 314 | End: 2024-05-07
Payer: MEDICARE

## 2024-05-07 ENCOUNTER — APPOINTMENT (OUTPATIENT)
Dept: DIALYSIS | Facility: HOSPITAL | Age: 49
End: 2024-05-07
Payer: MEDICARE

## 2024-05-07 ENCOUNTER — PHARMACY VISIT (OUTPATIENT)
Dept: PHARMACY | Facility: CLINIC | Age: 49
End: 2024-05-07
Payer: COMMERCIAL

## 2024-05-07 ENCOUNTER — ANESTHESIA EVENT (OUTPATIENT)
Dept: CARDIOLOGY | Facility: HOSPITAL | Age: 49
DRG: 314 | End: 2024-05-07
Payer: MEDICARE

## 2024-05-07 VITALS
OXYGEN SATURATION: 92 % | TEMPERATURE: 97.7 F | SYSTOLIC BLOOD PRESSURE: 169 MMHG | RESPIRATION RATE: 18 BRPM | DIASTOLIC BLOOD PRESSURE: 69 MMHG | WEIGHT: 145.94 LBS | BODY MASS INDEX: 22.12 KG/M2 | HEART RATE: 81 BPM | HEIGHT: 68 IN

## 2024-05-07 PROCEDURE — C1769 GUIDE WIRE: HCPCS

## 2024-05-07 PROCEDURE — A36581 PR REPLACE CV CATH, COMPLETE, TUNNELED, W/O SUBQ PORT OR PUMP: Performed by: STUDENT IN AN ORGANIZED HEALTH CARE EDUCATION/TRAINING PROGRAM

## 2024-05-07 PROCEDURE — 2500000004 HC RX 250 GENERAL PHARMACY W/ HCPCS (ALT 636 FOR OP/ED): Performed by: ANESTHESIOLOGIST ASSISTANT

## 2024-05-07 PROCEDURE — 2720000007 HC OR 272 NO HCPCS

## 2024-05-07 PROCEDURE — 2500000004 HC RX 250 GENERAL PHARMACY W/ HCPCS (ALT 636 FOR OP/ED): Mod: JZ | Performed by: STUDENT IN AN ORGANIZED HEALTH CARE EDUCATION/TRAINING PROGRAM

## 2024-05-07 PROCEDURE — 2500000005 HC RX 250 GENERAL PHARMACY W/O HCPCS: Performed by: ANESTHESIOLOGIST ASSISTANT

## 2024-05-07 PROCEDURE — 2500000004 HC RX 250 GENERAL PHARMACY W/ HCPCS (ALT 636 FOR OP/ED): Performed by: INTERNAL MEDICINE

## 2024-05-07 PROCEDURE — 2500000004 HC RX 250 GENERAL PHARMACY W/ HCPCS (ALT 636 FOR OP/ED): Performed by: RADIOLOGY

## 2024-05-07 PROCEDURE — 2500000001 HC RX 250 WO HCPCS SELF ADMINISTERED DRUGS (ALT 637 FOR MEDICARE OP): Performed by: STUDENT IN AN ORGANIZED HEALTH CARE EDUCATION/TRAINING PROGRAM

## 2024-05-07 PROCEDURE — 2500000004 HC RX 250 GENERAL PHARMACY W/ HCPCS (ALT 636 FOR OP/ED): Performed by: STUDENT IN AN ORGANIZED HEALTH CARE EDUCATION/TRAINING PROGRAM

## 2024-05-07 PROCEDURE — 3700000001 HC GENERAL ANESTHESIA TIME - INITIAL BASE CHARGE

## 2024-05-07 PROCEDURE — RXMED WILLOW AMBULATORY MEDICATION CHARGE

## 2024-05-07 PROCEDURE — C1894 INTRO/SHEATH, NON-LASER: HCPCS

## 2024-05-07 PROCEDURE — A36581 PR REPLACE CV CATH, COMPLETE, TUNNELED, W/O SUBQ PORT OR PUMP: Performed by: ANESTHESIOLOGIST ASSISTANT

## 2024-05-07 PROCEDURE — 36581 REPLACE TUNNELED CV CATH: CPT

## 2024-05-07 PROCEDURE — 77001 FLUOROGUIDE FOR VEIN DEVICE: CPT

## 2024-05-07 PROCEDURE — 36581 REPLACE TUNNELED CV CATH: CPT | Performed by: RADIOLOGY

## 2024-05-07 PROCEDURE — 8010000001 HC DIALYSIS - HEMODIALYSIS PER DAY

## 2024-05-07 PROCEDURE — 2780000003 HC OR 278 NO HCPCS

## 2024-05-07 PROCEDURE — 3700000002 HC GENERAL ANESTHESIA TIME - EACH INCREMENTAL 1 MINUTE

## 2024-05-07 PROCEDURE — C1887 CATHETER, GUIDING: HCPCS

## 2024-05-07 RX ORDER — HEPARIN SODIUM 1000 [USP'U]/ML
2100 INJECTION, SOLUTION INTRAVENOUS; SUBCUTANEOUS
Status: DISCONTINUED | OUTPATIENT
Start: 2024-05-07 | End: 2024-05-08 | Stop reason: HOSPADM

## 2024-05-07 RX ORDER — DEXTROSE MONOHYDRATE AND SODIUM CHLORIDE 5; .9 G/100ML; G/100ML
INJECTION, SOLUTION INTRAVENOUS CONTINUOUS PRN
Status: DISCONTINUED | OUTPATIENT
Start: 2024-05-07 | End: 2024-05-07

## 2024-05-07 RX ORDER — IODIXANOL 320 MG/ML
INJECTION, SOLUTION INTRAVASCULAR AS NEEDED
Status: DISCONTINUED | OUTPATIENT
Start: 2024-05-06 | End: 2024-05-08 | Stop reason: HOSPADM

## 2024-05-07 RX ORDER — HEPARIN SODIUM 1000 [USP'U]/ML
1000 INJECTION, SOLUTION INTRAVENOUS; SUBCUTANEOUS
OUTPATIENT
Start: 2024-05-07

## 2024-05-07 RX ORDER — ACETAMINOPHEN 325 MG/1
650 TABLET ORAL EVERY 4 HOURS PRN
Status: DISPENSED | OUTPATIENT
Start: 2024-05-07 | End: 2024-05-07

## 2024-05-07 RX ORDER — METOPROLOL TARTRATE 25 MG/1
25 TABLET, FILM COATED ORAL 2 TIMES DAILY
Qty: 60 TABLET | Refills: 0 | Status: SHIPPED | OUTPATIENT
Start: 2024-05-07

## 2024-05-07 RX ORDER — MIRTAZAPINE 15 MG/1
15 TABLET, FILM COATED ORAL NIGHTLY
Qty: 30 TABLET | Refills: 0 | Status: SHIPPED | OUTPATIENT
Start: 2024-05-07

## 2024-05-07 RX ORDER — SODIUM CHLORIDE, SODIUM LACTATE, POTASSIUM CHLORIDE, CALCIUM CHLORIDE 600; 310; 30; 20 MG/100ML; MG/100ML; MG/100ML; MG/100ML
100 INJECTION, SOLUTION INTRAVENOUS CONTINUOUS
Status: ACTIVE | OUTPATIENT
Start: 2024-05-07 | End: 2024-05-07

## 2024-05-07 RX ORDER — PROPOFOL 10 MG/ML
INJECTION, EMULSION INTRAVENOUS CONTINUOUS PRN
Status: DISCONTINUED | OUTPATIENT
Start: 2024-05-07 | End: 2024-05-07

## 2024-05-07 RX ORDER — OXYCODONE HYDROCHLORIDE 5 MG/1
5 TABLET ORAL EVERY 4 HOURS PRN
Status: DISPENSED | OUTPATIENT
Start: 2024-05-07 | End: 2024-05-07

## 2024-05-07 RX ORDER — VANCOMYCIN 750 MG/150ML
750 INJECTION, SOLUTION INTRAVENOUS
Status: DISCONTINUED | OUTPATIENT
Start: 2024-05-07 | End: 2024-05-08 | Stop reason: HOSPADM

## 2024-05-07 RX ORDER — ALBUTEROL SULFATE 0.83 MG/ML
2.5 SOLUTION RESPIRATORY (INHALATION) ONCE AS NEEDED
Status: DISCONTINUED | OUTPATIENT
Start: 2024-05-07 | End: 2024-05-07 | Stop reason: HOSPADM

## 2024-05-07 RX ORDER — RIFAMPIN 300 MG/1
300 CAPSULE ORAL EVERY 8 HOURS
Qty: 96 CAPSULE | Refills: 0 | Status: SHIPPED | OUTPATIENT
Start: 2024-05-07 | End: 2024-06-08

## 2024-05-07 RX ORDER — OXYCODONE HYDROCHLORIDE 5 MG/1
10 TABLET ORAL EVERY 4 HOURS PRN
Status: DISPENSED | OUTPATIENT
Start: 2024-05-07 | End: 2024-05-07

## 2024-05-07 RX ORDER — DIPHENHYDRAMINE HYDROCHLORIDE 50 MG/ML
50 INJECTION INTRAMUSCULAR; INTRAVENOUS ONCE
Status: COMPLETED | OUTPATIENT
Start: 2024-05-07 | End: 2024-05-07

## 2024-05-07 RX ADMIN — HYDROMORPHONE HYDROCHLORIDE 0.2 MG: 1 INJECTION, SOLUTION INTRAMUSCULAR; INTRAVENOUS; SUBCUTANEOUS at 05:17

## 2024-05-07 RX ADMIN — HYDROMORPHONE HYDROCHLORIDE 0.2 MG: 0.2 INJECTION, SOLUTION INTRAMUSCULAR; INTRAVENOUS; SUBCUTANEOUS at 11:23

## 2024-05-07 RX ADMIN — HEPARIN SODIUM 1000 UNITS: 1000 INJECTION INTRAVENOUS; SUBCUTANEOUS at 16:28

## 2024-05-07 RX ADMIN — DIPHENHYDRAMINE HYDROCHLORIDE 50 MG: 50 INJECTION, SOLUTION INTRAMUSCULAR; INTRAVENOUS at 12:40

## 2024-05-07 RX ADMIN — RIFAMPIN 300 MG: 300 CAPSULE ORAL at 05:25

## 2024-05-07 RX ADMIN — DIPHENHYDRAMINE HYDROCHLORIDE 25 MG: 50 INJECTION, SOLUTION INTRAMUSCULAR; INTRAVENOUS at 05:18

## 2024-05-07 RX ADMIN — DIPHENHYDRAMINE HYDROCHLORIDE 25 MG: 50 INJECTION, SOLUTION INTRAMUSCULAR; INTRAVENOUS at 01:01

## 2024-05-07 RX ADMIN — HYDROMORPHONE HYDROCHLORIDE 0.2 MG: 1 INJECTION, SOLUTION INTRAMUSCULAR; INTRAVENOUS; SUBCUTANEOUS at 01:01

## 2024-05-07 RX ADMIN — Medication 20 MG: at 10:38

## 2024-05-07 RX ADMIN — CLONIDINE HYDROCHLORIDE 0.1 MG: 0.1 TABLET ORAL at 17:06

## 2024-05-07 RX ADMIN — DIPHENHYDRAMINE HYDROCHLORIDE 50 MG: 50 INJECTION, SOLUTION INTRAMUSCULAR; INTRAVENOUS at 17:30

## 2024-05-07 RX ADMIN — Medication 20 MG: at 10:48

## 2024-05-07 RX ADMIN — Medication 10 MG: at 10:44

## 2024-05-07 RX ADMIN — DEXTROSE AND SODIUM CHLORIDE: 5; 900 INJECTION, SOLUTION INTRAVENOUS at 10:35

## 2024-05-07 RX ADMIN — HYDRALAZINE HYDROCHLORIDE 50 MG: 50 TABLET ORAL at 17:05

## 2024-05-07 RX ADMIN — HYDROMORPHONE HYDROCHLORIDE 0.2 MG: 1 INJECTION, SOLUTION INTRAMUSCULAR; INTRAVENOUS; SUBCUTANEOUS at 17:11

## 2024-05-07 RX ADMIN — VANCOMYCIN 750 MG: 750 INJECTION, SOLUTION INTRAVENOUS at 17:07

## 2024-05-07 RX ADMIN — CLONIDINE HYDROCHLORIDE 0.1 MG: 0.1 TABLET ORAL at 05:25

## 2024-05-07 RX ADMIN — PROPOFOL 100 MCG/KG/MIN: 10 INJECTION, EMULSION INTRAVENOUS at 10:29

## 2024-05-07 ASSESSMENT — PAIN DESCRIPTION - ORIENTATION
ORIENTATION: LEFT
ORIENTATION: LEFT

## 2024-05-07 ASSESSMENT — PAIN SCALES - GENERAL
PAINLEVEL_OUTOF10: 1
PAINLEVEL_OUTOF10: 5 - MODERATE PAIN
PAINLEVEL_OUTOF10: 10 - WORST POSSIBLE PAIN
PAINLEVEL_OUTOF10: 0 - NO PAIN
PAIN_LEVEL: 2
PAINLEVEL_OUTOF10: 6
PAINLEVEL_OUTOF10: 10 - WORST POSSIBLE PAIN
PAINLEVEL_OUTOF10: 2
PAINLEVEL_OUTOF10: 5 - MODERATE PAIN
PAINLEVEL_OUTOF10: 10 - WORST POSSIBLE PAIN

## 2024-05-07 ASSESSMENT — PAIN DESCRIPTION - DESCRIPTORS
DESCRIPTORS: ACHING
DESCRIPTORS: SHARP;ACHING

## 2024-05-07 ASSESSMENT — PAIN - FUNCTIONAL ASSESSMENT
PAIN_FUNCTIONAL_ASSESSMENT: 0-10
PAIN_FUNCTIONAL_ASSESSMENT: CPOT (CRITICAL CARE PAIN OBSERVATION TOOL)
PAIN_FUNCTIONAL_ASSESSMENT: CPOT (CRITICAL CARE PAIN OBSERVATION TOOL)
PAIN_FUNCTIONAL_ASSESSMENT: 0-10

## 2024-05-07 ASSESSMENT — PAIN DESCRIPTION - LOCATION
LOCATION: CHEST
LOCATION: CHEST

## 2024-05-07 NOTE — DISCHARGE INSTRUCTIONS
The following medication changes were made during your hospital stay:    START: metoprolol (lopressor) 25mg twice a daily. This medication is to help with your heart rate.   START: mirtazapine (remeron) 15mg nightly. This medication is to help with anxiety/depression.   START: rifampin 300mg every 8 hours until 6/8/24. This medication is to prevent infective endocarditis.     STOP: wellbutrin. This medication is not recommended in patient who have hx of seizures.   STOP: metoprolol succinate (toprol XL). You were switched to a different version of metoprolol.     You will need to get repeat blood cultures drawn on 6/15/24.

## 2024-05-07 NOTE — NURSING NOTE
Pt continues to refuse percocet for pain management, pain protocol and medications discussed with pt, will update md regarding first line medication

## 2024-05-07 NOTE — NURSING NOTE
Received bedside shift report, patient sitting up in bed, visitor at bedside, left chest site with guidewire in place, dressing c/d/I, vss, denies any needs at this time, call light within reach

## 2024-05-07 NOTE — PROGRESS NOTES
Vancomycin Dosing by Pharmacy- FOLLOW-UP (HEMODIALYSIS)    Batsheva Page is a 48 y.o. year old female who Pharmacy has been consulted for vancomycin dosing for Bacteremia. Based on the patient's indication and renal status this patient will be dosed based on a pre-HD level of 20-25 mcg/mL.     Patient is currently on hemodialysis.    Vancomycin maintenance dose: 750 mg after each dialysis session Tues/Thurs/Sat     Vancomycin pre-HD level 26 -Level taken 5/6    Lab Results   Component Value Date    VANCORANDOM 26.0 (H) 05/06/2024    VANCOTROUGH 27.9 (HH) 09/26/2022       Visit Vitals  /69 (BP Location: Right leg, Patient Position: Lying)   Pulse 84   Temp 36.4 °C (97.5 °F) (Temporal)   Resp 18        Lab Results   Component Value Date    CREATININE 10.30 (H) 05/06/2024    CREATININE 8.80 (H) 05/05/2024    CREATININE 6.70 (H) 05/04/2024    CREATININE 4.20 (H) 05/03/2024       I/O last 3 completed shifts:  In: 390 (5.9 mL/kg) [P.O.:390]  Out: 15 (0.2 mL/kg) [Blood:15]  Weight: 66.2 kg     Assessment/Plan     Vancomycin level slightly above goal and taken day before today's HD  Pre-HD level will be obtained on 5/9 at 0500. May be obtained sooner if clinically indicated. If level is above goal, random level with AM labs the next morning will be obtained.   Will continue to monitor renal function daily while on vancomycin and order serum creatinine at least every 48 hours if not already ordered.  Follow for continued vancomycin needs, clinical response, and signs/symptoms of toxicity.     Hiro Wallis [facility]

## 2024-05-07 NOTE — CARE PLAN
The patient's goals for the shift include      The clinical goals for the shift include pain management    Over the shift, the patient did make progress toward the following goals.   Problem: Safety - Adult  Goal: Free from fall injury  Outcome: Progressing     Problem: Chronic Conditions and Co-morbidities  Goal: Patient's chronic conditions and co-morbidity symptoms are monitored and maintained or improved  Outcome: Progressing     Problem: Pain  Goal: Takes deep breaths with improved pain control throughout the shift  Outcome: Progressing     Problem: Pain  Goal: Turns in bed with improved pain control throughout the shift  Outcome: Progressing     Problem: Pain  Goal: Walks with improved pain control throughout the shift  Outcome: Progressing     Problem: Pain  Goal: Performs ADL's with improved pain control throughout shift  Outcome: Progressing     Problem: Pain  Goal: Free from opioid side effects throughout the shift  Outcome: Progressing     Problem: Pain  Goal: Free from acute confusion related to pain meds throughout the shift  Outcome: Progressing

## 2024-05-07 NOTE — DISCHARGE SUMMARY
Discharge Diagnosis  Sepsis, due to unspecified organism, unspecified whether acute organ dysfunction present (Multi)    Issues Requiring Follow-Up  MRSA bacteremia    Discharge Meds     Your medication list        ASK your doctor about these medications        Instructions Last Dose Given Next Dose Due   acetaminophen 325 mg tablet  Commonly known as: Tylenol      Take 2 tablets (650 mg) by mouth every 6 hours as needed for mild pain (1 - 3).       aspirin 81 mg EC tablet      Take 1 tablet (81 mg) by mouth once daily.       atorvastatin 40 mg tablet  Commonly known as: Lipitor           buPROPion 100 mg tablet  Commonly known as: Wellbutrin      Take 1 tablet (100 mg) by mouth every other day.       calcitriol 0.25 mcg capsule  Commonly known as: Rocaltrol           cloNIDine 0.1 mg tablet  Commonly known as: Catapres      Take 1 tablet (0.1 mg) by mouth every 8 hours.       epoetin brandy-epbx 20,000 unit/mL solution  Commonly known as: RETACRIT      Inject 6,440 Units under the skin 3 times a week. Do not start before January 17, 2024.       ergocalciferol 1.25 MG (89874 UT) capsule  Commonly known as: Vitamin D-2           hydrALAZINE 50 mg tablet  Commonly known as: Apresoline           levETIRAcetam 500 mg tablet  Commonly known as: Keppra           Lokelma 10 gram packet  Generic drug: sodium zirconium cyclosilicate      DISSOLVE 1 PACKET INTO 45ML OF WATER TAKE DAILY BEFORE DINNER. ADMINSTER OTHER MEDICTIONS AT LEAST 2 HOURS BEFORE OR 2 HOURS AFTER LOKELMA       metoprolol succinate XL 50 mg 24 hr tablet  Commonly known as: Toprol-XL      TAKE 1 TABLET BY MOUTH TWICE DAILY       oxyCODONE-acetaminophen 5-325 mg tablet  Commonly known as: Percocet      Take 1 tablet by mouth every 6 hours as needed for moderate pain (4 - 6).       pregabalin 25 mg capsule  Commonly known as: Lyrica      Take 2 capsules (50 mg) by mouth 2 times a day.       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 Found to be hypotensive in the ED. Labs notable for leukocytosis, hyperkalemia, and elevated lactic acid. Admitted to the ICU for septic shock and severe hyperkalemia requiring emergent dialysis. Nephrology and ID were consulted. Blood cultures positive for MRSA. TTE (4/29) and LAURA (5/1) negative for endocarditis. Palliative care consulted - GOC and code status discussed - patient agreeable to aggressive treatment but does not want to undergo another valve/heart surgery ever again. DNRCCA, DNI, ok for ICU transfer with low threshold for hospice consult if she were to decline. Transferred out of the ICU on 5/2/24. Hospital course complicated by an episode of SVT which was treated with metoprolol. Given difficulty with vascular access, patient's left permacath was unable to be removed; however was exchanged by IR on 5/7/24. Repeat blood cultures NGTD. Given recurrent line infections, ID recommended switching from HD to peritoneal dialysis; however, patient remains unsure about this decision. Will discharge on IV vancomycin 750mg after every dialysis session and rifampin through 6/8 with repeat blood cultures on 6/15 per ID recommendations. Patient will need to follow up with ID in 4-6 weeks.     Pertinent Physical Exam At Time of Discharge  Physical Exam    Outpatient Follow-Up  No future appointments.    Time spent on discharge: 35 minutes    Keeley Murdock MD

## 2024-05-07 NOTE — ANESTHESIA PREPROCEDURE EVALUATION
"Patient: Batsheva Page    Procedure Information       Date/Time: 05/07/24 1015    Scheduled providers: Chris Lott MD    Procedure: IR CVC EXCHANGE    Location: [facility]          Past Medical History:   Diagnosis Date    Aortic valve replaced 2022    CHF (congestive heart failure) (Multi)     Chronic pain     Coronary artery disease     Disease of thyroid gland     ESRD (end stage renal disease) (Multi)     H/O mitral valve replacement 2013    and 2022    Heart disease     History of transfusion     Hypertension     Mitral valve regurgitation     Seizures (Multi)     Stroke (Multi)      Past Surgical History:   Procedure Laterality Date    AORTIC VALVE REPLACEMENT  2020    bioprosthetic    CORONARY ARTERY BYPASS GRAFT      IR CVC TUNNELED  09/09/2022    IR CVC TUNNELED 9/9/2022 [facility]GAC[facility]    IR CVC TUNNELED  12/28/2022    IR CVC TUNNELED 12/28/2022 [facility] INPATIENT LEGACY    MITRAL VALVE REPLACEMENT  2013    bioprosthetic    MITRAL VALVE REPLACEMENT  2020    bioprosthetic    PARATHYROIDECTOMY      US GUIDED PERCUTANEOUS PLACEMENT  07/14/2022    US GUIDED PERCUTANEOUS PLACEMENT [facility]     Allergies   Allergen Reactions    Kayexalate Seizure    Metoclopramide Hcl Other     anxious, agitated, \"skin crawl    Prochlorperazine Other     anxious, agitated, \"skin crawl    Zofran [Ondansetron Hcl] Headache    Ondansetron Other and Headache       Relevant Problems   Cardiac   (+) Arteriosclerosis of coronary artery bypass graft   (+) Benign essential hypertension   (+) Cardiac pacemaker in situ   (+) Coronary artery disease   (+) Hypertension   (+) Paroxysmal atrial fibrillation (Multi)      Neuro   (+) Anxiety   (+) Depression with anxiety   (+) Major depressive disorder, recurrent, severe with psychotic symptoms (Multi)   (+) Seizure (Multi)      /Renal   (+) End stage renal disease (Multi)   (+) End-stage renal disease on hemodialysis (Multi)   (+) Hyponatremia    "   Hematology   (+) Anemia of chronic disease   (+) Anemia secondary to renal failure   (+) Iron deficiency anemia      ID   (+) MRSA (methicillin resistant Staphylococcus aureus) infection   (+) MRSA bacteremia       Clinical information reviewed:    Allergies  Meds               NPO Detail:  NPO/Void Status  Date of Last Liquid: 05/05/24  Time of Last Liquid: 1900  Date of Last Solid: 05/05/24  Time of Last Solid: 1900         Physical Exam    Airway  Mallampati: II  TM distance: >3 FB  Neck ROM: full     Cardiovascular   Comments: deferred   Dental    Pulmonary   Comments: deferred   Abdominal            Anesthesia Plan    History of general anesthesia?: yes  History of complications of general anesthesia?: no    ASA 3     MAC     intravenous induction   Postoperative administration of opioids is intended.  Anesthetic plan and risks discussed with patient.  Use of blood products discussed with patient who.

## 2024-05-07 NOTE — NURSING NOTE
Assumed care of pt, pt is awake lying in bed, HR is 82 SR on tele, pt c/o pain at her cath site, on RA, bedside shift report given by FARAZ Lamb, call light w/in reach.

## 2024-05-07 NOTE — PROGRESS NOTES
Batsheva Page is a 48 y.o. female on day 9 of admission presenting with Sepsis, due to unspecified organism, unspecified whether acute organ dysfunction present (Multi).      Subjective   Received permacath exchange this morning with IR. Seen while in dialysis. States she feels okay, tired from the procedure. Would like to go home today if possible.        Objective     Last Recorded Vitals  /69 (BP Location: Right leg, Patient Position: Lying)   Pulse 82   Temp 36 °C (96.8 °F) (Temporal)   Resp 18   Wt 66.2 kg (145 lb 15.1 oz)   SpO2 92%   Intake/Output last 3 Shifts:    Intake/Output Summary (Last 24 hours) at 5/7/2024 1244  Last data filed at 5/7/2024 1105  Gross per 24 hour   Intake 590 ml   Output 1 ml   Net 589 ml       Admission Weight  Weight: 63.7 kg (140 lb 6.9 oz) (04/27/24 2039)    Daily Weight  05/07/24 : 66.2 kg (145 lb 15.1 oz)    Image Results  IR CVC tunneled  Narrative: Interpreted By:  Chris Lott,   STUDY:  IR CVC TUNNELED; 5/6/2024 4:57 pm      INDICATION:  End-stage renal disease on hemodialysis. Presents for insertion of  tunneled central venous catheter      COMPARISON:  None      ACCESSION NUMBER(S):  [id number]      ORDERING CLINICIAN:  CATE BAUTISTA      TECHNIQUE:  Conscious sedation: 86 minutes  Insertion of tunneled central venous catheter via existing tract  (exchange of tunneled central venous catheter) Central (left  brachiocephalic/SVC) venogram Fluoroscopy time: 6.26 minutes  Images: 3  Dose: 193.9 mm air kerma      FINDINGS:  Informed consent obtained. Patient positioned supine and connected  physiologic monitoring. Conscious sedation provided with Versed,  fentanyl and Benadryl. Left upper chest and indwelling 4 Croatian  Leija catheter were prepped and draped. The indwelling small  caliber catheter was removed over Amplatz wire. The subcutaneous  tract was serially dilated to 12 Croatian. An attempt at advancing a  14.5 Croatian dual-lumen hemodialysis catheter  over the wire resulted  in resistance at the near the level of the clavicular head. Venogram  demonstrates narrowed brachiocephalic vein with fibrin sheath.  Despite large amounts of side of medications, the patient could not  tolerate the completion of exam due to extreme anxiety and pain upon  trying to further dilate the tract. Decision was made to defer  procedure till next day with anesthesia. An 11 [place] Islander sheath was left  to maintain central venous access, secured with suture and covered  with sterile dressing.      Impression: Left brachiocephalic venogram revealing narrowed vein with diffuse  fibrin sheath. Patient could not tolerate advancement of a tunneled  central venous catheter over the wire with conscious sedation.  Procedure will be rescheduled with monitored anesthesia care.      Signed by: Chris Lott 5/7/2024 9:12 AM  Dictation workstation:   [id number]      Physical Exam  Constitutional:       General: She is not in acute distress.     Appearance: She is not toxic-appearing.   HENT:      Head: Normocephalic.      Mouth/Throat:      Pharynx: Oropharynx is clear.   Eyes:      General: No scleral icterus.  Cardiovascular:      Rate and Rhythm: Normal rate.   Pulmonary:      Effort: No respiratory distress.      Breath sounds: No wheezing.   Abdominal:      General: There is no distension.      Palpations: Abdomen is soft.   Musculoskeletal:      Right lower leg: No edema.      Left lower leg: No edema.   Neurological:      Mental Status: She is alert.   Psychiatric:         Behavior: Behavior normal.         Relevant Results               Assessment/Plan        Principal Problem:    Sepsis, due to unspecified organism, unspecified whether acute organ dysfunction present (Multi)    MRSA bacteremia  Septic shock, resolved  Hx of recurrent MRSA bacteremia and infective endocarditis (s/p aortic valve 2020 and mitral 2013 and 2020 replacements)  ID following  LAURA negative for vegetations  S/p  permacath exchange 5/7/24  Blood cultures 4/30/24 negative  Continue vancomycin and rifampin until 6/8/24 per ID  Will need repeat blood cultures on 6/15/24     ESRD on HD  Nephrology following  Dr. Soto recommending peritoneal dialysis   Will defer further discussions to nephrology     SVT  Continue lopressor     Myalgias  Continue dilaudid PRN     Pruritus   Continue benadryl, claritin, and topicals     Anemia of chronic renal disease  H&H stable  No overt signs of bleeding  Transfuse Hgb <7     Depression  Continue bupropion     HTN  Continue clonidine, hydralazine, and lopressor     Case discussed with Dr. Pedersen and Dr. Soto - plan for discharge home later today. IV vanc will be arranged by Dr. Pedersen. Continue IV vanc and rifampin until 6/8/24 per Dr. Soto. Will need repeat blood cultures drawn on 6/15/24.      Dispo: discharge home today.               Keeley Murdock MD

## 2024-05-07 NOTE — CARE PLAN
The patient's goals for the shift include      The clinical goals for the shift include pain management      Problem: Pain - Adult  Goal: Verbalizes/displays adequate comfort level or baseline comfort level  Outcome: Progressing     Problem: Safety - Adult  Goal: Free from fall injury  Outcome: Progressing     Problem: Discharge Planning  Goal: Discharge to home or other facility with appropriate resources  Outcome: Progressing     Problem: Chronic Conditions and Co-morbidities  Goal: Patient's chronic conditions and co-morbidity symptoms are monitored and maintained or improved  Outcome: Progressing     Problem: Skin  Goal: Decreased wound size/increased tissue granulation at next dressing change  Outcome: Progressing  Goal: Participates in plan/prevention/treatment measures  Outcome: Progressing  Goal: Prevent/manage excess moisture  Outcome: Progressing  Goal: Prevent/minimize sheer/friction injuries  Outcome: Progressing  Goal: Promote/optimize nutrition  Outcome: Progressing  Goal: Promote skin healing  Outcome: Progressing     Problem: Pain  Goal: Takes deep breaths with improved pain control throughout the shift  Outcome: Progressing  Goal: Turns in bed with improved pain control throughout the shift  Outcome: Progressing  Goal: Walks with improved pain control throughout the shift  Outcome: Progressing  Goal: Performs ADL's with improved pain control throughout shift  Outcome: Progressing  Goal: Participates in PT with improved pain control throughout the shift  Outcome: Progressing  Goal: Free from opioid side effects throughout the shift  Outcome: Progressing  Goal: Free from acute confusion related to pain meds throughout the shift  Outcome: Progressing     Problem: Fall/Injury  Goal: Not fall by end of shift  Outcome: Progressing  Goal: Be free from injury by end of the shift  Outcome: Progressing  Goal: Verbalize understanding of personal risk factors for fall in the hospital  Outcome:  Progressing  Goal: Verbalize understanding of risk factor reduction measures to prevent injury from fall in the home  Outcome: Progressing  Goal: Pace activities to prevent fatigue by end of the shift  Outcome: Progressing  Goal: Use assistive devices by end of the shift  Outcome: Progressing

## 2024-05-07 NOTE — NURSING NOTE
Pt refusing percocet for pain management, pt only agreeable to take dilaudid stating that percocet only works for her restless leg syndrome but not incisional pain

## 2024-05-07 NOTE — NURSING NOTE
Report from Sending RN:    Report From: Kerry in PACU  Recent Surgery of Procedure: Yes  Baseline Level of Consciousness (LOC): A&OX's 4  Oxygen Use: No  Type: room air  Diabetic: No  Last BP Med Given Day of Dialysis: see EMAR  Last Pain Med Given: 0.2 dialudid  Lab Tests to be Obtained with Dialysis: No  Blood Transfusion to be Given During Dialysis: No  Available IV Access: Yes  Medications to be Administered During Dialysis: No  Continuous IV Infusion Running: No  Restraints on Currently or in the Last 24 Hours: No  Hand-Off Communication: Patient tolerated procedure of having left tunnel cath placed.  Patient refusing any oxygen.    Dialysis Catheter Dressing: will access when patient arrives  Last Dressing Change: will access when patient arrives

## 2024-05-07 NOTE — PROGRESS NOTES
"Batsheva Page is a 48 y.o. female on day 9 of admission presenting with Sepsis, due to unspecified organism, unspecified whether acute organ dysfunction present (Multi).    Subjective   Patient seen for end-stage kidney disease admitted with MRSA bacteremia she just had her catheter changed by interventional etiology seen and examined on dialysis okay no new complaints       Objective     Physical Exam  Neck:      Vascular: No carotid bruit.   Cardiovascular:      Rate and Rhythm: Normal rate and regular rhythm.      Heart sounds: Murmur heard.      No friction rub. No gallop.   Pulmonary:      Breath sounds: No wheezing, rhonchi or rales.   Chest:      Chest wall: No tenderness.   Abdominal:      General: There is no distension.      Tenderness: There is no abdominal tenderness. There is no guarding or rebound.   Musculoskeletal:         General: No swelling or tenderness.      Cervical back: Neck supple.      Right lower leg: No edema.      Left lower leg: No edema.   Lymphadenopathy:      Cervical: No cervical adenopathy.         Last Recorded Vitals  Blood pressure 169/69, pulse 82, temperature 36 °C (96.8 °F), temperature source Temporal, resp. rate 18, height 1.727 m (5' 8\"), weight 66.2 kg (145 lb 15.1 oz), SpO2 92%.    Intake/Output last 3 Shifts:  I/O last 3 completed shifts:  In: 390 (5.9 mL/kg) [P.O.:390]  Out: 0 (0 mL/kg)   Weight: 66.2 kg     Current Facility-Administered Medications:     acetaminophen (Tylenol) tablet 650 mg, 650 mg, oral, q6h PRN, Keeley Murdock MD, 650 mg at 05/03/24 0854    acetaminophen (Tylenol) tablet 650 mg, 650 mg, oral, q4h PRN, Nohemy Boyd MD    albuterol 2.5 mg /3 mL (0.083 %) nebulizer solution 2.5 mg, 2.5 mg, nebulization, Once PRN, Nohemy Boyd MD    aspirin EC tablet 81 mg, 81 mg, oral, Daily, Keeley Murdock MD, 81 mg at 05/05/24 0909    atorvastatin (Lipitor) tablet 40 mg, 40 mg, oral, Nightly, Keeley Murdock MD, 40 mg at 05/06/24 2038    calcitriol " (Rocaltrol) capsule 0.5 mcg, 0.5 mcg, oral, Daily, Keeley Murdock MD, 0.5 mcg at 05/05/24 0909    cloNIDine (Catapres) tablet 0.1 mg, 0.1 mg, oral, q12h, Keeley Murdock MD, 0.1 mg at 05/07/24 0525    dextrose 50 % injection 12.5 g, 12.5 g, intravenous, q15 min PRN, Keeley Murdock MD    dextrose 50 % injection 25 g, 25 g, intravenous, q15 min PRN, Keeley Murdock MD    diphenhydrAMINE (BENADryl) injection 25 mg, 25 mg, intravenous, q4h PRN, Keeley Murdock MD, 25 mg at 05/07/24 0518    diphenhydrAMINE (BENADryl) injection 50 mg, 50 mg, intravenous, Once, Zhou Pedersen MD    diphenhydrAMINE (BENADryl) injection, , , PRN, Chris Lott MD, 50 mg at 05/06/24 1612    epoetin brandy-epbx (RETACRIT) injection 6,500 Units, 6,500 Units, subcutaneous, Once per day on Monday Wednesday Friday, Keeley Murdock MD, 6,500 Units at 05/03/24 1536    ergocalciferol (Vitamin D-2) capsule 1,250 mcg, 1,250 mcg, oral, Every Sunday, Keeley Murdock MD, 1,250 mcg at 05/05/24 0909    fentaNYL PF (Sublimaze) injection, , , PRN, Chris Lott MD, 50 mcg at 05/06/24 1544    glucagon (Glucagen) injection 1 mg, 1 mg, intramuscular, q15 min PRN, Keeley Murdock MD    glucagon (Glucagen) injection 1 mg, 1 mg, intramuscular, q15 min PRN, Keeley Murdock MD    heparin (porcine) injection 5,000 Units, 5,000 Units, subcutaneous, q8h KIMBERLY, Keeley Murdock MD, 5,000 Units at 05/05/24 0532    heparin 1,000 unit/mL injection 1,000 Units, 1,000 Units, intra-catheter, After Dialysis, Keeley Murdock MD, 2,100 Units at 05/02/24 1133    heparin 1,000 unit/mL injection 1,000 Units, 1,000 Units, intra-catheter, After Dialysis, Keeley Murdock MD, 2,100 Units at 05/02/24 1133    heparin 1,000 unit/mL injection 1,000 Units, 1,000 Units, intra-catheter, After Dialysis, Keeley Murdock MD    heparin 1,000 unit/mL injection 1,000 Units, 1,000 Units, intra-catheter, After Dialysis, Keeley Murdcok MD    hydrALAZINE (Apresoline) tablet 50 mg, 50 mg, oral, TID,  Keeley Murdock MD, 50 mg at 05/06/24 2038    HYDROmorphone (Dilaudid) injection 0.2 mg, 0.2 mg, intravenous, q4h PRN, Keeley Murdock MD, 0.2 mg at 05/07/24 0517    HYDROmorphone (Dilaudid) injection 0.4 mg, 0.4 mg, intravenous, q5 min PRN, Nohemy Boyd MD    HYDROmorphone PF (Dilaudid) injection 0.2 mg, 0.2 mg, intravenous, q5 min PRN, Nohemy Boyd MD, 0.2 mg at 05/07/24 1123    hydrOXYzine pamoate (Vistaril) capsule 25 mg, 25 mg, oral, q8h PRN, Keeley Murdock MD    lactated Ringer's infusion, 100 mL/hr, intravenous, Continuous, Nohemy Boyd MD, Last Rate: 100 mL/hr at 05/07/24 1145, 100 mL/hr at 05/07/24 1145    levETIRAcetam (Keppra) tablet 500 mg, 500 mg, oral, Nightly, Keeley Murdock MD, 500 mg at 05/04/24 2101    lidocaine PF (Xylocaine) 10 mg/mL (1 %) injection, , , PRN, Chris Lott MD, 10 mL at 05/06/24 1515    loratadine (Claritin) tablet 10 mg, 10 mg, oral, q48h PRN, Keeley Murdock MD    melatonin tablet 1 mg, 1 mg, oral, Nightly, Keeley Murdock MD, 1 mg at 05/05/24 2041    metoprolol tartrate (Lopressor) tablet 25 mg, 25 mg, oral, BID, Keeley Murdock MD, 25 mg at 05/06/24 2039    midazolam (Versed) injection, , , PRN, Chris Lott MD, 2 mg at 05/06/24 1604    mirtazapine (Remeron) tablet 15 mg, 15 mg, oral, Nightly, Keeley Murdock MD, 15 mg at 05/06/24 2038    oxyCODONE (Roxicodone) immediate release tablet 10 mg, 10 mg, oral, q4h PRN, Nohemy Boyd MD    oxyCODONE (Roxicodone) immediate release tablet 5 mg, 5 mg, oral, q4h PRN, Nohemy Boyd MD    oxyCODONE-acetaminophen (Percocet) 5-325 mg per tablet 1 tablet, 1 tablet, oral, q6h PRN, Keeley Murdock MD, 1 tablet at 05/02/24 0703    oxygen (O2) therapy, , , Continuous PRN, Chris Lott MD, Stopped at 05/06/24 1633    oxygen (O2) therapy, , inhalation, Continuous PRN - O2/gases, Nohemy Boyd MD    polyethylene glycol (Glycolax, Miralax) packet 17 g, 17 g, oral, Daily PRN, Keeley Murdock MD    pramoxine (Sarna  Sensitive) 1 % lotion, , Topical, q8h PRN, Keeley Murdock MD    pregabalin (Lyrica) capsule 50 mg, 50 mg, oral, BID, Keeley Murdock MD, 50 mg at 05/06/24 2038    promethazine (Phenergan) tablet 25 mg, 25 mg, oral, Daily PRN, Keeley Murdock MD, 25 mg at 05/02/24 1223    rifAMPin (Rifadin) capsule 300 mg, 300 mg, oral, q8h, Keeley Murdock MD, 300 mg at 05/07/24 0525    vancomycin (Vancocin) pharmacy to dose - pharmacy monitoring, , miscellaneous, Daily PRN, Keeley Murdock MD   Relevant Results    Results for orders placed or performed during the hospital encounter of 04/27/24 (from the past 96 hour(s))   Vancomycin   Result Value Ref Range    Vancomycin 28.2 (H) 10.0 - 20.0 ug/mL   CBC and Auto Differential   Result Value Ref Range    WBC 5.7 4.4 - 11.3 x10*3/uL    nRBC 0.0 0.0 - 0.0 /100 WBCs    RBC 3.53 (L) 4.00 - 5.20 x10*6/uL    Hemoglobin 9.0 (L) 12.0 - 16.0 g/dL    Hematocrit 30.2 (L) 36.0 - 46.0 %    MCV 86 80 - 100 fL    MCH 25.5 (L) 26.0 - 34.0 pg    MCHC 29.8 (L) 32.0 - 36.0 g/dL    RDW 19.9 (H) 11.5 - 14.5 %    Platelets 250 150 - 450 x10*3/uL    Neutrophils % 70.0 40.0 - 80.0 %    Immature Granulocytes %, Automated 1.9 (H) 0.0 - 0.9 %    Lymphocytes % 17.9 13.0 - 44.0 %    Monocytes % 7.3 2.0 - 10.0 %    Eosinophils % 2.6 0.0 - 6.0 %    Basophils % 0.3 0.0 - 2.0 %    Neutrophils Absolute 4.01 1.20 - 7.70 x10*3/uL    Immature Granulocytes Absolute, Automated 0.11 0.00 - 0.70 x10*3/uL    Lymphocytes Absolute 1.03 (L) 1.20 - 4.80 x10*3/uL    Monocytes Absolute 0.42 0.10 - 1.00 x10*3/uL    Eosinophils Absolute 0.15 0.00 - 0.70 x10*3/uL    Basophils Absolute 0.02 0.00 - 0.10 x10*3/uL   Basic Metabolic Panel   Result Value Ref Range    Glucose 113 (H) 65 - 99 mg/dL    Sodium 136 133 - 145 mmol/L    Potassium 3.5 3.4 - 5.1 mmol/L    Chloride 98 97 - 107 mmol/L    Bicarbonate 20 (L) 24 - 31 mmol/L    Urea Nitrogen 30 (H) 8 - 25 mg/dL    Creatinine 6.70 (H) 0.40 - 1.60 mg/dL    eGFR 7 (L) >60 mL/min/1.73m*2     Calcium 7.7 (L) 8.5 - 10.4 mg/dL    Anion Gap 18 <=19 mmol/L   CBC and Auto Differential   Result Value Ref Range    WBC 5.3 4.4 - 11.3 x10*3/uL    nRBC 0.0 0.0 - 0.0 /100 WBCs    RBC 3.56 (L) 4.00 - 5.20 x10*6/uL    Hemoglobin 8.9 (L) 12.0 - 16.0 g/dL    Hematocrit 30.6 (L) 36.0 - 46.0 %    MCV 86 80 - 100 fL    MCH 25.0 (L) 26.0 - 34.0 pg    MCHC 29.1 (L) 32.0 - 36.0 g/dL    RDW 19.4 (H) 11.5 - 14.5 %    Platelets 285 150 - 450 x10*3/uL    Neutrophils % 65.4 40.0 - 80.0 %    Immature Granulocytes %, Automated 1.3 (H) 0.0 - 0.9 %    Lymphocytes % 19.4 13.0 - 44.0 %    Monocytes % 6.3 2.0 - 10.0 %    Eosinophils % 7.2 0.0 - 6.0 %    Basophils % 0.4 0.0 - 2.0 %    Neutrophils Absolute 3.45 1.20 - 7.70 x10*3/uL    Immature Granulocytes Absolute, Automated 0.07 0.00 - 0.70 x10*3/uL    Lymphocytes Absolute 1.02 (L) 1.20 - 4.80 x10*3/uL    Monocytes Absolute 0.33 0.10 - 1.00 x10*3/uL    Eosinophils Absolute 0.38 0.00 - 0.70 x10*3/uL    Basophils Absolute 0.02 0.00 - 0.10 x10*3/uL   Basic Metabolic Panel   Result Value Ref Range    Glucose 104 (H) 65 - 99 mg/dL    Sodium 137 133 - 145 mmol/L    Potassium 3.8 3.4 - 5.1 mmol/L    Chloride 96 (L) 97 - 107 mmol/L    Bicarbonate 19 (L) 24 - 31 mmol/L    Urea Nitrogen 41 (H) 8 - 25 mg/dL    Creatinine 8.80 (H) 0.40 - 1.60 mg/dL    eGFR 5 (L) >60 mL/min/1.73m*2    Calcium 7.8 (L) 8.5 - 10.4 mg/dL    Anion Gap >19 (H) <=19 mmol/L   CBC and Auto Differential   Result Value Ref Range    WBC 6.1 4.4 - 11.3 x10*3/uL    nRBC 0.0 0.0 - 0.0 /100 WBCs    RBC 3.49 (L) 4.00 - 5.20 x10*6/uL    Hemoglobin 8.8 (L) 12.0 - 16.0 g/dL    Hematocrit 30.0 (L) 36.0 - 46.0 %    MCV 86 80 - 100 fL    MCH 25.2 (L) 26.0 - 34.0 pg    MCHC 29.3 (L) 32.0 - 36.0 g/dL    RDW 19.5 (H) 11.5 - 14.5 %    Platelets 331 150 - 450 x10*3/uL    Neutrophils % 62.1 40.0 - 80.0 %    Immature Granulocytes %, Automated 0.8 0.0 - 0.9 %    Lymphocytes % 19.7 13.0 - 44.0 %    Monocytes % 5.2 2.0 - 10.0 %    Eosinophils %  11.9 0.0 - 6.0 %    Basophils % 0.3 0.0 - 2.0 %    Neutrophils Absolute 3.80 1.20 - 7.70 x10*3/uL    Immature Granulocytes Absolute, Automated 0.05 0.00 - 0.70 x10*3/uL    Lymphocytes Absolute 1.21 1.20 - 4.80 x10*3/uL    Monocytes Absolute 0.32 0.10 - 1.00 x10*3/uL    Eosinophils Absolute 0.73 (H) 0.00 - 0.70 x10*3/uL    Basophils Absolute 0.02 0.00 - 0.10 x10*3/uL   Basic Metabolic Panel   Result Value Ref Range    Glucose 102 (H) 65 - 99 mg/dL    Sodium 137 133 - 145 mmol/L    Potassium 4.0 3.4 - 5.1 mmol/L    Chloride 97 97 - 107 mmol/L    Bicarbonate 19 (L) 24 - 31 mmol/L    Urea Nitrogen 55 (H) 8 - 25 mg/dL    Creatinine 10.30 (H) 0.40 - 1.60 mg/dL    eGFR 4 (L) >60 mL/min/1.73m*2    Calcium 7.8 (L) 8.5 - 10.4 mg/dL    Anion Gap >19 (H) <=19 mmol/L   Vancomycin   Result Value Ref Range    Vancomycin 26.0 (H) 10.0 - 20.0 ug/mL       Assessment/Plan   End stage kidney disease dialysis today if catheter works fine she probably could be discharged home tomorrow to receive IV antibiotics as an outpatient  Hyperkalemia resolved  Septic shock improved going for removal of her dialysis catheter today continue antibiotics per infectious disease  Anemia of chronic kidney disease  Hypertension  Aortic and mitral valve replacements                   Zhou Pedersen MD

## 2024-05-07 NOTE — NURSING NOTE
Report to Receiving RN:    Report To: Kayy Molina  Time Report Called: 0103  Hand-Off Communication: Mrs Young completed a 3.5 hour treatment. Her catheter worked great. She removed 2000cc andlast BP was 181/57   Complications During Treatment: No  Ultrafiltration Treatment: No  Medications Administered During Dialysis: No  Blood Products Administered During Dialysis: No  Labs Sent During Dialysis: No  Heparin Drip Rate Changes: No  Dialysis Catheter Dressing: Clean dry and intact  Last Dressing Change: 5/7/2024    Electronic Signatures:  Deya Marley RN       Last Updated: 4:32 PM by DEYA MARLEY

## 2024-05-07 NOTE — PROGRESS NOTES
"INFECTIOUS DISEASES PROGRESS NOTE    Consulted / following patient for:  MRSA sepsis, presumed dialysis catheter infection    Subjective   Interval History:   Complains of severe pain during the attempted insertion of a new tunneled catheter on 5/6.  Planning for line replacement under anesthesia today.  States that she will speak with Dr. Pedersen regarding transition to peritoneal dialysis    Objective   PHYSICAL EXAMINATION  Vital signs:  Visit Vitals  BP (!) 136/43 (BP Location: Left leg)   Pulse 80   Temp 36.9 °C (98.4 °F) (Oral)   Resp 18   General: Not toxic  Chest: Guidewire present under dressing over the left chest  Heart: S1, S2 with sharp prosthetic valve sounds.  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  Lanzer apparently for placement of a new tunneled catheter over the guidewire under anesthesia later today.    Cough and nasal congestion  Resolving    PLANS:  -   Continue vancomycin (pharmacy dosing) and rifampin  -   Anticipate that she will be discharged on a regimen of post-dialysis vancomycin 750 mg after every dialysis through 6/7 or 6/8, continuing the \"vancomycin lock\" and followed by the collection of 2 blood cultures on 6/14 or 6/15  -    Again, would urge the patient to consider beginning peritoneal dialysis during the next 6 weeks    Adama Soto MD  [facility]  Office:  [phone]  "

## 2024-05-07 NOTE — ANESTHESIA POSTPROCEDURE EVALUATION
Patient: Batsheva Page    Procedure Summary       Date: 05/07/24 Room / Location: [facility]    Anesthesia Start: 1021 Anesthesia Stop: 1115    Procedure: IR CVC EXCHANGE Diagnosis: (ESRD)    Scheduled Providers: Chris Lott MD Responsible Provider: Nohemy Boyd MD    Anesthesia Type: MAC ASA Status: 3            Anesthesia Type: MAC    Vitals Value Taken Time   /48 05/07/24 1116   Temp 35.8 °C (96.4 °F) 05/07/24 1115   Pulse 85 05/07/24 1118   Resp 16 05/07/24 1118   SpO2 94 % 05/07/24 1118   Vitals shown include unfiled device data.    Anesthesia Post Evaluation    Patient location during evaluation: PACU  Patient participation: complete - patient participated  Level of consciousness: awake and alert  Pain score: 2  Pain management: adequate  Airway patency: patent  Cardiovascular status: acceptable  Respiratory status: acceptable  Hydration status: acceptable  Postoperative Nausea and Vomiting: none        No notable events documented.

## 2024-05-08 ENCOUNTER — PATIENT OUTREACH (OUTPATIENT)
Dept: CARE COORDINATION | Facility: CLINIC | Age: 49
End: 2024-05-08
Payer: MEDICARE

## 2024-05-08 NOTE — PROGRESS NOTES
[facility]  Admitted 4/27/2024  Discharged 5/7/2024  Dx: Sepsis, MRSA Bacteremia, SVT, ESRD-HD-MWF  Required emergent dialysis     Outreach call to patient to support a smooth transition of care from recent admission.  Patient states, she id doing okay. States, she had chills and then was hot last night but okay today. Denies fever. Denies palpitations. Reviewed discharge medications, discharge instructions, assessed social needs, and provided education on importance of follow-up appointment with provider.      Discussed scheduling follow up with Infectious Disease, Nephrology, and PCP    Patient to have blood cultures drawn on 6/15/2024    Will continue to monitor through transition period.    Engagement  Call Start Time: 1022 (5/8/2024 10:22 AM)    Medications  Medications reviewed with patient/caregiver?: Yes (5/8/2024 10:22 AM)  Is the patient having any side effects they believe may be caused by any medication additions or changes?: No (5/8/2024 10:22 AM)  Does the patient have all medications ordered at discharge?: Yes (5/8/2024 10:22 AM)  Care Management Interventions: No intervention needed (5/8/2024 10:22 AM)  Prescription Comments: Discharged on po Metoprolol, po Mirtazepine, po Rifampin, IV Vancomycin after dialysis (5/8/2024 10:22 AM)  Is the patient taking all medications as directed (includes completed medication regime)?: Yes (5/8/2024 10:22 AM)    Appointments  Does the patient have a primary care provider?: Yes (5/8/2024 10:22 AM)  Care Management Interventions: Educated patient on importance of making appointment; Advised patient to make appointment (5/8/2024 10:22 AM)  Care Management Interventions: Advised to schedule with specialist (Patient to follow up with infectious disease and nephrologist) (5/8/2024 10:22 AM)    Self Management  What is the home health agency?: not applicable (5/8/2024 10:22 AM)  What Durable Medical Equipment (DME) was ordered?: not applicable (5/8/2024  10:22 AM)    Patient Teaching  Does the patient have access to their discharge instructions?: Yes (5/8/2024 10:22 AM)  Care Management Interventions: Reviewed instructions with patient (5/8/2024 10:22 AM)  What is the patient's perception of their health status since discharge?: Improving (5/8/2024 10:22 AM)  Is the patient/caregiver able to teach back the hierarchy of who to call/visit for symptoms/problems? PCP, Specialist, Home Health nurse, Urgent Care, ED, 911: Yes (5/8/2024 10:22 AM)    Demetria Martinez RN/CM  [facility]  [phone]

## 2024-05-24 ENCOUNTER — PATIENT OUTREACH (OUTPATIENT)
Dept: CARE COORDINATION | Facility: CLINIC | Age: 49
End: 2024-05-24
Payer: MEDICARE

## 2024-05-24 NOTE — PROGRESS NOTES
Outreach call to patient to check in 2 weeks after hospital discharge to support smooth transition of care. No answer and unable to leave a message, mailbox is full.  Will continue to follow.      Demetria Martinez RN/CM  [facility]  [phone]

## 2024-06-20 ENCOUNTER — PATIENT OUTREACH (OUTPATIENT)
Dept: CARE COORDINATION | Facility: CLINIC | Age: 49
End: 2024-06-20
Payer: MEDICARE

## 2024-06-20 NOTE — PROGRESS NOTES
Outreach call to patient to check in 30 days after hospital discharge to support smooth transition of care. Mailbox if full and unable to leave a message. No additional outreach needed at this time.     Second outreach attempt with mailbox being full.  CM will close case    Demetria Martinez RN/CM  [facility]  [phone]

## 2024-08-18 ENCOUNTER — HOSPITAL ENCOUNTER (INPATIENT)
Facility: HOSPITAL | Age: 49
LOS: 11 days | Discharge: HOME | DRG: 314 | End: 2024-08-29
Attending: INTERNAL MEDICINE | Admitting: INTERNAL MEDICINE
Payer: MEDICARE

## 2024-08-18 DIAGNOSIS — L02.91 ABSCESS: Primary | ICD-10-CM

## 2024-08-18 DIAGNOSIS — N18.6 ESRD (END STAGE RENAL DISEASE) (MULTI): ICD-10-CM

## 2024-08-18 LAB
ALBUMIN SERPL-MCNC: 3.5 G/DL (ref 3.5–5)
ALP BLD-CCNC: 82 U/L (ref 35–125)
ALT SERPL-CCNC: 7 U/L (ref 5–40)
ANION GAP SERPL CALC-SCNC: >19 MMOL/L
AST SERPL-CCNC: 15 U/L (ref 5–40)
BASOPHILS # BLD AUTO: 0.02 X10*3/UL (ref 0–0.1)
BASOPHILS NFR BLD AUTO: 0.3 %
BILIRUB SERPL-MCNC: 0.4 MG/DL (ref 0.1–1.2)
BUN SERPL-MCNC: 54 MG/DL (ref 8–25)
CALCIUM SERPL-MCNC: 8.1 MG/DL (ref 8.5–10.4)
CHLORIDE SERPL-SCNC: 84 MMOL/L (ref 97–107)
CO2 SERPL-SCNC: 21 MMOL/L (ref 24–31)
CREAT SERPL-MCNC: 9.7 MG/DL (ref 0.4–1.6)
EGFRCR SERPLBLD CKD-EPI 2021: 5 ML/MIN/1.73M*2
EOSINOPHIL # BLD AUTO: 0.12 X10*3/UL (ref 0–0.7)
EOSINOPHIL NFR BLD AUTO: 1.6 %
ERYTHROCYTE [DISTWIDTH] IN BLOOD BY AUTOMATED COUNT: 19.6 % (ref 11.5–14.5)
GLUCOSE SERPL-MCNC: 104 MG/DL (ref 65–99)
HCT VFR BLD AUTO: 31.4 % (ref 36–46)
HGB BLD-MCNC: 9.8 G/DL (ref 12–16)
IMM GRANULOCYTES # BLD AUTO: 0.02 X10*3/UL (ref 0–0.7)
IMM GRANULOCYTES NFR BLD AUTO: 0.3 % (ref 0–0.9)
LACTATE BLDV-SCNC: 1.8 MMOL/L (ref 0.4–2)
LYMPHOCYTES # BLD AUTO: 0.78 X10*3/UL (ref 1.2–4.8)
LYMPHOCYTES NFR BLD AUTO: 10.3 %
MAGNESIUM SERPL-MCNC: 1.9 MG/DL (ref 1.6–3.1)
MCH RBC QN AUTO: 27.7 PG (ref 26–34)
MCHC RBC AUTO-ENTMCNC: 31.2 G/DL (ref 32–36)
MCV RBC AUTO: 89 FL (ref 80–100)
MONOCYTES # BLD AUTO: 0.7 X10*3/UL (ref 0.1–1)
MONOCYTES NFR BLD AUTO: 9.3 %
NEUTROPHILS # BLD AUTO: 5.91 X10*3/UL (ref 1.2–7.7)
NEUTROPHILS NFR BLD AUTO: 78.2 %
NRBC BLD-RTO: 0 /100 WBCS (ref 0–0)
PLATELET # BLD AUTO: 165 X10*3/UL (ref 150–450)
POTASSIUM SERPL-SCNC: 3.7 MMOL/L (ref 3.4–5.1)
PROT SERPL-MCNC: 7.8 G/DL (ref 5.9–7.9)
RBC # BLD AUTO: 3.54 X10*6/UL (ref 4–5.2)
SARS-COV-2 RNA RESP QL NAA+PROBE: NOT DETECTED
SODIUM SERPL-SCNC: 130 MMOL/L (ref 133–145)
WBC # BLD AUTO: 7.6 X10*3/UL (ref 4.4–11.3)

## 2024-08-18 PROCEDURE — 87635 SARS-COV-2 COVID-19 AMP PRB: CPT | Performed by: INTERNAL MEDICINE

## 2024-08-18 PROCEDURE — 99285 EMERGENCY DEPT VISIT HI MDM: CPT

## 2024-08-18 PROCEDURE — 85025 COMPLETE CBC W/AUTO DIFF WBC: CPT

## 2024-08-18 PROCEDURE — 36415 COLL VENOUS BLD VENIPUNCTURE: CPT

## 2024-08-18 PROCEDURE — 83605 ASSAY OF LACTIC ACID: CPT

## 2024-08-18 PROCEDURE — 2500000001 HC RX 250 WO HCPCS SELF ADMINISTERED DRUGS (ALT 637 FOR MEDICARE OP)

## 2024-08-18 PROCEDURE — 1200000002 HC GENERAL ROOM WITH TELEMETRY DAILY

## 2024-08-18 PROCEDURE — 96365 THER/PROPH/DIAG IV INF INIT: CPT

## 2024-08-18 PROCEDURE — 87040 BLOOD CULTURE FOR BACTERIA: CPT | Mod: WESLAB

## 2024-08-18 PROCEDURE — 83735 ASSAY OF MAGNESIUM: CPT

## 2024-08-18 PROCEDURE — 80053 COMPREHEN METABOLIC PANEL: CPT

## 2024-08-18 PROCEDURE — 2500000004 HC RX 250 GENERAL PHARMACY W/ HCPCS (ALT 636 FOR OP/ED): Performed by: INTERNAL MEDICINE

## 2024-08-18 PROCEDURE — 96375 TX/PRO/DX INJ NEW DRUG ADDON: CPT

## 2024-08-18 PROCEDURE — 2500000004 HC RX 250 GENERAL PHARMACY W/ HCPCS (ALT 636 FOR OP/ED)

## 2024-08-18 RX ORDER — ACETAMINOPHEN 160 MG/5ML
650 SOLUTION ORAL EVERY 4 HOURS PRN
Status: DISCONTINUED | OUTPATIENT
Start: 2024-08-18 | End: 2024-08-28

## 2024-08-18 RX ORDER — HEPARIN SODIUM 5000 [USP'U]/ML
5000 INJECTION, SOLUTION INTRAVENOUS; SUBCUTANEOUS EVERY 8 HOURS
Status: DISCONTINUED | OUTPATIENT
Start: 2024-08-18 | End: 2024-08-29 | Stop reason: HOSPADM

## 2024-08-18 RX ORDER — METOPROLOL TARTRATE 25 MG/1
25 TABLET, FILM COATED ORAL 2 TIMES DAILY
Status: DISCONTINUED | OUTPATIENT
Start: 2024-08-18 | End: 2024-08-29 | Stop reason: HOSPADM

## 2024-08-18 RX ORDER — ACETAMINOPHEN 650 MG/1
650 SUPPOSITORY RECTAL EVERY 4 HOURS PRN
Status: DISCONTINUED | OUTPATIENT
Start: 2024-08-18 | End: 2024-08-28

## 2024-08-18 RX ORDER — HYDROMORPHONE HYDROCHLORIDE 1 MG/ML
1 INJECTION, SOLUTION INTRAMUSCULAR; INTRAVENOUS; SUBCUTANEOUS ONCE
Status: COMPLETED | OUTPATIENT
Start: 2024-08-18 | End: 2024-08-18

## 2024-08-18 RX ORDER — ATORVASTATIN CALCIUM 40 MG/1
40 TABLET, FILM COATED ORAL NIGHTLY
Status: DISCONTINUED | OUTPATIENT
Start: 2024-08-18 | End: 2024-08-29 | Stop reason: HOSPADM

## 2024-08-18 RX ORDER — GUAIFENESIN 600 MG/1
600 TABLET, EXTENDED RELEASE ORAL EVERY 12 HOURS PRN
Status: DISCONTINUED | OUTPATIENT
Start: 2024-08-18 | End: 2024-08-29 | Stop reason: HOSPADM

## 2024-08-18 RX ORDER — VANCOMYCIN HYDROCHLORIDE 1 G/20ML
INJECTION, POWDER, LYOPHILIZED, FOR SOLUTION INTRAVENOUS DAILY PRN
Status: DISCONTINUED | OUTPATIENT
Start: 2024-08-18 | End: 2024-08-29 | Stop reason: HOSPADM

## 2024-08-18 RX ORDER — CALCITRIOL 0.25 UG/1
0.5 CAPSULE ORAL DAILY
Status: DISCONTINUED | OUTPATIENT
Start: 2024-08-19 | End: 2024-08-29 | Stop reason: HOSPADM

## 2024-08-18 RX ORDER — LEVETIRACETAM 750 MG/1
750 TABLET ORAL NIGHTLY
Status: DISCONTINUED | OUTPATIENT
Start: 2024-08-18 | End: 2024-08-29 | Stop reason: HOSPADM

## 2024-08-18 RX ORDER — VANCOMYCIN 2 G/400ML
2 INJECTION, SOLUTION INTRAVENOUS ONCE
Status: COMPLETED | OUTPATIENT
Start: 2024-08-18 | End: 2024-08-18

## 2024-08-18 RX ORDER — OXYCODONE AND ACETAMINOPHEN 5; 325 MG/1; MG/1
2 TABLET ORAL ONCE
Status: COMPLETED | OUTPATIENT
Start: 2024-08-18 | End: 2024-08-18

## 2024-08-18 RX ORDER — MIRTAZAPINE 15 MG/1
15 TABLET, FILM COATED ORAL NIGHTLY
Status: DISCONTINUED | OUTPATIENT
Start: 2024-08-18 | End: 2024-08-29 | Stop reason: HOSPADM

## 2024-08-18 RX ORDER — ONDANSETRON 4 MG/1
4 TABLET, ORALLY DISINTEGRATING ORAL EVERY 8 HOURS PRN
Status: DISCONTINUED | OUTPATIENT
Start: 2024-08-18 | End: 2024-08-29 | Stop reason: HOSPADM

## 2024-08-18 RX ORDER — ONDANSETRON HYDROCHLORIDE 2 MG/ML
4 INJECTION, SOLUTION INTRAVENOUS EVERY 8 HOURS PRN
Status: DISCONTINUED | OUTPATIENT
Start: 2024-08-18 | End: 2024-08-29 | Stop reason: HOSPADM

## 2024-08-18 RX ORDER — ACETAMINOPHEN 500 MG
5 TABLET ORAL NIGHTLY PRN
Status: DISCONTINUED | OUTPATIENT
Start: 2024-08-18 | End: 2024-08-29 | Stop reason: HOSPADM

## 2024-08-18 RX ORDER — GUAIFENESIN/DEXTROMETHORPHAN 100-10MG/5
5 SYRUP ORAL EVERY 4 HOURS PRN
Status: DISCONTINUED | OUTPATIENT
Start: 2024-08-18 | End: 2024-08-29 | Stop reason: HOSPADM

## 2024-08-18 RX ORDER — PREGABALIN 25 MG/1
50 CAPSULE ORAL 2 TIMES DAILY
Status: DISCONTINUED | OUTPATIENT
Start: 2024-08-18 | End: 2024-08-29 | Stop reason: HOSPADM

## 2024-08-18 RX ORDER — ACETAMINOPHEN 325 MG/1
650 TABLET ORAL EVERY 4 HOURS PRN
Status: DISCONTINUED | OUTPATIENT
Start: 2024-08-18 | End: 2024-08-28

## 2024-08-18 RX ADMIN — HYDROMORPHONE HYDROCHLORIDE 1 MG: 1 INJECTION, SOLUTION INTRAMUSCULAR; INTRAVENOUS; SUBCUTANEOUS at 20:53

## 2024-08-18 RX ADMIN — VANCOMYCIN 2 G: 2 INJECTION, SOLUTION INTRAVENOUS at 20:56

## 2024-08-18 RX ADMIN — OXYCODONE HYDROCHLORIDE AND ACETAMINOPHEN 2 TABLET: 5; 325 TABLET ORAL at 20:04

## 2024-08-18 RX ADMIN — HYDROMORPHONE HYDROCHLORIDE 0.4 MG: 1 INJECTION, SOLUTION INTRAMUSCULAR; INTRAVENOUS; SUBCUTANEOUS at 23:22

## 2024-08-18 RX ADMIN — PIPERACILLIN SODIUM AND TAZOBACTAM SODIUM 4.5 G: 4; .5 INJECTION, SOLUTION INTRAVENOUS at 20:04

## 2024-08-18 SDOH — ECONOMIC STABILITY: HOUSING INSECURITY: DO YOU FEEL UNSAFE GOING BACK TO THE PLACE WHERE YOU LIVE?: NO

## 2024-08-18 SDOH — SOCIAL STABILITY: SOCIAL INSECURITY: HAVE YOU HAD ANY THOUGHTS OF HARMING ANYONE ELSE?: NO

## 2024-08-18 SDOH — SOCIAL STABILITY: SOCIAL INSECURITY: ARE THERE ANY APPARENT SIGNS OF INJURIES/BEHAVIORS THAT COULD BE RELATED TO ABUSE/NEGLECT?: NO

## 2024-08-18 SDOH — SOCIAL STABILITY: SOCIAL INSECURITY: DOES ANYONE TRY TO KEEP YOU FROM HAVING/CONTACTING OTHER FRIENDS OR DOING THINGS OUTSIDE YOUR HOME?: NO

## 2024-08-18 SDOH — SOCIAL STABILITY: SOCIAL INSECURITY: DO YOU FEEL UNSAFE GOING BACK TO THE PLACE WHERE YOU ARE LIVING?: NO

## 2024-08-18 SDOH — SOCIAL STABILITY: SOCIAL INSECURITY: DO YOU FEEL ANYONE HAS EXPLOITED OR TAKEN ADVANTAGE OF YOU FINANCIALLY OR OF YOUR PERSONAL PROPERTY?: NO

## 2024-08-18 SDOH — SOCIAL STABILITY: SOCIAL INSECURITY: ABUSE: ADULT

## 2024-08-18 SDOH — SOCIAL STABILITY: SOCIAL INSECURITY: HAS ANYONE EVER THREATENED TO HURT YOUR FAMILY OR YOUR PETS?: NO

## 2024-08-18 SDOH — SOCIAL STABILITY: SOCIAL INSECURITY: WERE YOU ABLE TO COMPLETE ALL THE BEHAVIORAL HEALTH SCREENINGS?: YES

## 2024-08-18 SDOH — SOCIAL STABILITY: SOCIAL INSECURITY: HAVE YOU HAD THOUGHTS OF HARMING ANYONE ELSE?: NO

## 2024-08-18 SDOH — SOCIAL STABILITY: SOCIAL INSECURITY: ARE YOU OR HAVE YOU BEEN THREATENED OR ABUSED PHYSICALLY, EMOTIONALLY, OR SEXUALLY BY ANYONE?: NO

## 2024-08-18 ASSESSMENT — ACTIVITIES OF DAILY LIVING (ADL)
HEARING - LEFT EAR: FUNCTIONAL
BATHING: INDEPENDENT
DRESSING YOURSELF: INDEPENDENT
LACK_OF_TRANSPORTATION: NO
GROOMING: INDEPENDENT
TOILETING: INDEPENDENT
JUDGMENT_ADEQUATE_SAFELY_COMPLETE_DAILY_ACTIVITIES: YES
ASSISTIVE_DEVICE: EYEGLASSES;CANE
WALKS IN HOME: INDEPENDENT
PATIENT'S MEMORY ADEQUATE TO SAFELY COMPLETE DAILY ACTIVITIES?: YES
FEEDING YOURSELF: INDEPENDENT
HEARING - RIGHT EAR: FUNCTIONAL
ADEQUATE_TO_COMPLETE_ADL: YES

## 2024-08-18 ASSESSMENT — COLUMBIA-SUICIDE SEVERITY RATING SCALE - C-SSRS
2. HAVE YOU ACTUALLY HAD ANY THOUGHTS OF KILLING YOURSELF?: NO
6. HAVE YOU EVER DONE ANYTHING, STARTED TO DO ANYTHING, OR PREPARED TO DO ANYTHING TO END YOUR LIFE?: NO

## 2024-08-18 ASSESSMENT — COGNITIVE AND FUNCTIONAL STATUS - GENERAL
PATIENT BASELINE BEDBOUND: NO
DAILY ACTIVITIY SCORE: 24
MOBILITY SCORE: 24

## 2024-08-18 ASSESSMENT — LIFESTYLE VARIABLES
HOW OFTEN DO YOU HAVE 6 OR MORE DRINKS ON ONE OCCASION: NEVER
AUDIT-C TOTAL SCORE: 0
HAVE PEOPLE ANNOYED YOU BY CRITICIZING YOUR DRINKING: NO
TOTAL SCORE: 0
HOW OFTEN DO YOU HAVE A DRINK CONTAINING ALCOHOL: NEVER
AUDIT-C TOTAL SCORE: 0
EVER FELT BAD OR GUILTY ABOUT YOUR DRINKING: NO
HOW MANY STANDARD DRINKS CONTAINING ALCOHOL DO YOU HAVE ON A TYPICAL DAY: PATIENT DOES NOT DRINK
SKIP TO QUESTIONS 9-10: 1
HAVE YOU EVER FELT YOU SHOULD CUT DOWN ON YOUR DRINKING: NO
EVER HAD A DRINK FIRST THING IN THE MORNING TO STEADY YOUR NERVES TO GET RID OF A HANGOVER: NO

## 2024-08-18 ASSESSMENT — PAIN SCALES - GENERAL
PAINLEVEL_OUTOF10: 10 - WORST POSSIBLE PAIN
PAINLEVEL_OUTOF10: 6
PAINLEVEL_OUTOF10: 10 - WORST POSSIBLE PAIN

## 2024-08-18 ASSESSMENT — PAIN - FUNCTIONAL ASSESSMENT
PAIN_FUNCTIONAL_ASSESSMENT: 0-10

## 2024-08-18 ASSESSMENT — PATIENT HEALTH QUESTIONNAIRE - PHQ9
2. FEELING DOWN, DEPRESSED OR HOPELESS: NOT AT ALL
1. LITTLE INTEREST OR PLEASURE IN DOING THINGS: NOT AT ALL
SUM OF ALL RESPONSES TO PHQ9 QUESTIONS 1 & 2: 0

## 2024-08-18 ASSESSMENT — PAIN DESCRIPTION - LOCATION
LOCATION: CHEST
LOCATION: CHEST

## 2024-08-18 ASSESSMENT — PAIN DESCRIPTION - ORIENTATION
ORIENTATION: LEFT
ORIENTATION: LEFT

## 2024-08-18 ASSESSMENT — PAIN DESCRIPTION - DESCRIPTORS: DESCRIPTORS: ACHING;SHARP;SORE

## 2024-08-18 ASSESSMENT — PAIN DESCRIPTION - PAIN TYPE: TYPE: ACUTE PAIN

## 2024-08-18 NOTE — ED PROVIDER NOTES
HPI   Chief Complaint   Patient presents with    infected catheter     Pt stated her left chest dialysis catheter has been infected with pain since Thursday. Site is red, pussy and POP.       HPI  Patient is a 48-year-old female with history of ESRD, sepsis, recurrent line infections who presents to ED for infected left-sided dialysis catheter.  Patient states catheter was removed since the pain started.  She complains of severe pain, redness, purulent discharge.  She denies any fevers.  Denies any headache or dizziness, cough congestion, chest pain or shortness of breath, abdominal pain or NVD, urinary symptoms.  Patient was recently hospitalized in May for sepsis.  Patient does have a history of recurrent line infections.  She does have a large abscess on the left side of her chest.  She is otherwise not ill-appearing and in no acute distress.      Patient History   Past Medical History:   Diagnosis Date    Aortic valve replaced 2022    CHF (congestive heart failure) (Multi)     Chronic pain     Coronary artery disease     Disease of thyroid gland     ESRD (end stage renal disease) (Multi)     H/O mitral valve replacement 2013    and 2022    Heart disease     History of transfusion     Hypertension     Mitral valve regurgitation     Seizures (Multi)     Stroke (Multi)      Past Surgical History:   Procedure Laterality Date    AORTIC VALVE REPLACEMENT  2020    bioprosthetic    CORONARY ARTERY BYPASS GRAFT      IR CVC TUNNELED  09/09/2022    IR CVC TUNNELED 9/9/2022 [facility]    IR CVC TUNNELED  12/28/2022    IR CVC TUNNELED 12/28/2022 [facility]    MITRAL VALVE REPLACEMENT  2013    bioprosthetic    MITRAL VALVE REPLACEMENT  2020    bioprosthetic    PARATHYROIDECTOMY      US GUIDED PERCUTANEOUS PLACEMENT  07/14/2022    US GUIDED PERCUTANEOUS PLACEMENT [facility]     Family History   Problem Relation Name Age of Onset    No Known Problems Mother      No Known Problems Father        Social History     Tobacco Use    Smoking status: Never    Smokeless tobacco: Never   Vaping Use    Vaping status: Never Used   Substance Use Topics    Alcohol use: Never    Drug use: Never       Physical Exam   ED Triage Vitals [08/18/24 1910]   Temperature Heart Rate Respirations BP   36.8 °C (98.2 °F) (!) 109 18 126/63      Pulse Ox Temp Source Heart Rate Source Patient Position   100 % Oral -- --      BP Location FiO2 (%)     -- --       Physical Exam  Vitals reviewed.   Constitutional:       General: She is not in acute distress.     Appearance: Normal appearance. She is not ill-appearing.   HENT:      Head: Normocephalic and atraumatic.   Eyes:      Extraocular Movements: Extraocular movements intact.   Cardiovascular:      Rate and Rhythm: Normal rate and regular rhythm.   Pulmonary:      Effort: Pulmonary effort is normal.      Breath sounds: Normal breath sounds.   Abdominal:      General: Abdomen is flat.      Palpations: Abdomen is soft.      Tenderness: There is no abdominal tenderness.   Musculoskeletal:         General: Normal range of motion.      Cervical back: Normal range of motion and neck supple.   Skin:     Findings: Lesion (Large abscess on the left subclavian region of the chest) present.   Neurological:      General: No focal deficit present.      Mental Status: She is alert and oriented to person, place, and time.   Psychiatric:         Mood and Affect: Mood normal.         Behavior: Behavior normal.           ED Course & MDM   Diagnoses as of 08/18/24 2224   Abscess                 No data recorded                                 Medical Decision Making  Parts of this chart have been completed using voice recognition software. Please excuse any errors of transcription.  My thought process and reason for plan has been formulated from the time that I saw the patient until the time of disposition and is not specific to one specific moment during their visit and furthermore my MDM  encompasses this entire chart and not only this text box.    HPI:   Detailed above.    Exam:   A medically appropriate exam performed, outlined above, given the known history and presentation.    History obtained from:   Patient    EKG/Cardiac monitor:     Social Determinants of Health considered during this visit:       Medications given during visit:  Medications   vancomycin (Xellia) 2 g in diluent combination  mL (2 g intravenous New Bag 8/18/24 2056)   oxyCODONE-acetaminophen (Percocet) 5-325 mg per tablet 2 tablet (2 tablets oral Given 8/18/24 2004)   piperacillin-tazobactam (Zosyn) 4.5 g in dextrose (iso)  mL (0 g intravenous Stopped 8/18/24 2034)   HYDROmorphone (Dilaudid) injection 1 mg (1 mg intravenous Given 8/18/24 2053)        Diagnostic/tests:  Labs Reviewed   CBC WITH AUTO DIFFERENTIAL - Abnormal       Result Value    WBC 7.6      nRBC 0.0      RBC 3.54 (*)     Hemoglobin 9.8 (*)     Hematocrit 31.4 (*)     MCV 89      MCH 27.7      MCHC 31.2 (*)     RDW 19.6 (*)     Platelets 165      Neutrophils % 78.2      Immature Granulocytes %, Automated 0.3      Lymphocytes % 10.3      Monocytes % 9.3      Eosinophils % 1.6      Basophils % 0.3      Neutrophils Absolute 5.91      Immature Granulocytes Absolute, Automated 0.02      Lymphocytes Absolute 0.78 (*)     Monocytes Absolute 0.70      Eosinophils Absolute 0.12      Basophils Absolute 0.02     COMPREHENSIVE METABOLIC PANEL - Abnormal    Glucose 104 (*)     Sodium 130 (*)     Potassium 3.7      Chloride 84 (*)     Bicarbonate 21 (*)     Urea Nitrogen 54 (*)     Creatinine 9.70 (*)     eGFR 5 (*)     Calcium 8.1 (*)     Albumin 3.5      Alkaline Phosphatase 82      Total Protein 7.8      AST 15      Bilirubin, Total 0.4      ALT 7      Anion Gap >19 (*)    MAGNESIUM - Normal    Magnesium 1.90     BLOOD GAS LACTIC ACID, VENOUS - Normal    POCT Lactate, Venous 1.8     BLOOD CULTURE   BLOOD CULTURE   BLOOD CULTURE   SARS-COV-2 PCR   RENAL FUNCTION  PANEL   CBC WITH AUTO DIFFERENTIAL      No orders to display        Differential Diagnosis:       Consultations:      Procedures:      Critical Care:      Referrals:      Discharge Prescriptions:      MDM Summary:  Patient will require admission inpatient medical service for further management of her dialysis catheter infection.  Patient is currently not meeting sepsis criteria at this time but will empirically start her on vancomycin and Zosyn.  Vital signs are all within normal limits, patient is afebrile.  There is no leukocytosis.  Patient's hemoglobin is 9.8 today.  No major electrolyte abnormality.  Patient's lactic acid is within normal limits.  Patient treated for pain with oxycodone, she reported little to no relief of pain, patient was given Dilaudid and reports significant relief of pain.  I spoke with the admitting physician Dr. Lopez. We thoroughly discussed the patient's medical history, physical exam, laboratory and imaging studies, as well as, emergency department course. This also includes the patient's comorbidities, surgical history, medications, and living situation. Based upon that discussion, we've decided to admit for further management and evaluation of their infection. The patient will be admitted to RNF unit as determined by myself and the admitting physician. The admitting physician has been fully informed regarding this case and agreed to place the admission order for the patient. Please see the admission H&P authored by Dr. Lopez for further detail.    Procedure  Procedures     John Garcia PA-C  08/18/24 6659

## 2024-08-18 NOTE — Clinical Note
Patient Clipped and Prepped: chest and neck. Prepped with ChloraPrep, a minimum of 3 minute dry time, longer if needed, no pooling noted, patient draped in sterile fashion.

## 2024-08-19 ENCOUNTER — APPOINTMENT (OUTPATIENT)
Dept: DIALYSIS | Facility: HOSPITAL | Age: 49
End: 2024-08-19
Payer: MEDICARE

## 2024-08-19 LAB
ALBUMIN SERPL-MCNC: 3.3 G/DL (ref 3.5–5)
ANION GAP SERPL CALC-SCNC: >19 MMOL/L
BASOPHILS # BLD AUTO: 0.03 X10*3/UL (ref 0–0.1)
BASOPHILS NFR BLD AUTO: 0.5 %
BUN SERPL-MCNC: 58 MG/DL (ref 8–25)
CALCIUM SERPL-MCNC: 8 MG/DL (ref 8.5–10.4)
CHLORIDE SERPL-SCNC: 87 MMOL/L (ref 97–107)
CO2 SERPL-SCNC: 19 MMOL/L (ref 24–31)
CREAT SERPL-MCNC: 10.4 MG/DL (ref 0.4–1.6)
EGFRCR SERPLBLD CKD-EPI 2021: 4 ML/MIN/1.73M*2
EOSINOPHIL # BLD AUTO: 0.29 X10*3/UL (ref 0–0.7)
EOSINOPHIL NFR BLD AUTO: 4.4 %
ERYTHROCYTE [DISTWIDTH] IN BLOOD BY AUTOMATED COUNT: 19.9 % (ref 11.5–14.5)
GLUCOSE SERPL-MCNC: 99 MG/DL (ref 65–99)
HCT VFR BLD AUTO: 32.3 % (ref 36–46)
HGB BLD-MCNC: 9.8 G/DL (ref 12–16)
IMM GRANULOCYTES # BLD AUTO: 0.01 X10*3/UL (ref 0–0.7)
IMM GRANULOCYTES NFR BLD AUTO: 0.2 % (ref 0–0.9)
LYMPHOCYTES # BLD AUTO: 1.23 X10*3/UL (ref 1.2–4.8)
LYMPHOCYTES NFR BLD AUTO: 18.6 %
MCH RBC QN AUTO: 27.4 PG (ref 26–34)
MCHC RBC AUTO-ENTMCNC: 30.3 G/DL (ref 32–36)
MCV RBC AUTO: 90 FL (ref 80–100)
MONOCYTES # BLD AUTO: 0.75 X10*3/UL (ref 0.1–1)
MONOCYTES NFR BLD AUTO: 11.3 %
NEUTROPHILS # BLD AUTO: 4.32 X10*3/UL (ref 1.2–7.7)
NEUTROPHILS NFR BLD AUTO: 65 %
NRBC BLD-RTO: 0 /100 WBCS (ref 0–0)
PHOSPHATE SERPL-MCNC: 6.1 MG/DL (ref 2.5–4.5)
PLATELET # BLD AUTO: 165 X10*3/UL (ref 150–450)
POTASSIUM SERPL-SCNC: 4.2 MMOL/L (ref 3.4–5.1)
RBC # BLD AUTO: 3.58 X10*6/UL (ref 4–5.2)
SODIUM SERPL-SCNC: 132 MMOL/L (ref 133–145)
WBC # BLD AUTO: 6.6 X10*3/UL (ref 4.4–11.3)

## 2024-08-19 PROCEDURE — 85025 COMPLETE CBC W/AUTO DIFF WBC: CPT | Performed by: INTERNAL MEDICINE

## 2024-08-19 PROCEDURE — 2500000004 HC RX 250 GENERAL PHARMACY W/ HCPCS (ALT 636 FOR OP/ED): Performed by: INTERNAL MEDICINE

## 2024-08-19 PROCEDURE — 36415 COLL VENOUS BLD VENIPUNCTURE: CPT | Performed by: INTERNAL MEDICINE

## 2024-08-19 PROCEDURE — 1200000002 HC GENERAL ROOM WITH TELEMETRY DAILY

## 2024-08-19 PROCEDURE — 2500000001 HC RX 250 WO HCPCS SELF ADMINISTERED DRUGS (ALT 637 FOR MEDICARE OP): Performed by: INTERNAL MEDICINE

## 2024-08-19 PROCEDURE — 87040 BLOOD CULTURE FOR BACTERIA: CPT | Mod: WESLAB | Performed by: INTERNAL MEDICINE

## 2024-08-19 PROCEDURE — 80069 RENAL FUNCTION PANEL: CPT | Performed by: INTERNAL MEDICINE

## 2024-08-19 PROCEDURE — 87070 CULTURE OTHR SPECIMN AEROBIC: CPT | Mod: WESLAB | Performed by: INTERNAL MEDICINE

## 2024-08-19 PROCEDURE — 99232 SBSQ HOSP IP/OBS MODERATE 35: CPT | Performed by: INTERNAL MEDICINE

## 2024-08-19 PROCEDURE — 6350000001 HC RX 635 EPOETIN >10,000 UNITS: Performed by: INTERNAL MEDICINE

## 2024-08-19 PROCEDURE — 8010000001 HC DIALYSIS - HEMODIALYSIS PER DAY

## 2024-08-19 RX ORDER — DIPHENHYDRAMINE HCL 25 MG
25 TABLET ORAL EVERY 6 HOURS PRN
Status: DISCONTINUED | OUTPATIENT
Start: 2024-08-19 | End: 2024-08-19

## 2024-08-19 RX ORDER — DIPHENHYDRAMINE HYDROCHLORIDE 50 MG/ML
25 INJECTION INTRAMUSCULAR; INTRAVENOUS EVERY 6 HOURS PRN
Status: DISCONTINUED | OUTPATIENT
Start: 2024-08-19 | End: 2024-08-26

## 2024-08-19 RX ORDER — DIPHENHYDRAMINE HYDROCHLORIDE 50 MG/ML
50 INJECTION INTRAMUSCULAR; INTRAVENOUS ONCE
Status: COMPLETED | OUTPATIENT
Start: 2024-08-19 | End: 2024-08-19

## 2024-08-19 RX ADMIN — DIPHENHYDRAMINE HYDROCHLORIDE 25 MG: 50 INJECTION, SOLUTION INTRAMUSCULAR; INTRAVENOUS at 17:42

## 2024-08-19 RX ADMIN — PREGABALIN 50 MG: 25 CAPSULE ORAL at 08:46

## 2024-08-19 RX ADMIN — DIPHENHYDRAMINE HYDROCHLORIDE 25 MG: 50 INJECTION, SOLUTION INTRAMUSCULAR; INTRAVENOUS at 11:26

## 2024-08-19 RX ADMIN — HYDROMORPHONE HYDROCHLORIDE 0.4 MG: 1 INJECTION, SOLUTION INTRAMUSCULAR; INTRAVENOUS; SUBCUTANEOUS at 16:01

## 2024-08-19 RX ADMIN — CALCITRIOL CAPSULES 0.25 MCG 0.5 MCG: 0.25 CAPSULE ORAL at 08:46

## 2024-08-19 RX ADMIN — VANCOMYCIN HYDROCHLORIDE 500 MG: 500 INJECTION, POWDER, LYOPHILIZED, FOR SOLUTION INTRAVENOUS at 17:42

## 2024-08-19 RX ADMIN — PIPERACILLIN SODIUM AND TAZOBACTAM SODIUM 2.25 G: 2; .25 INJECTION, SOLUTION INTRAVENOUS at 05:39

## 2024-08-19 RX ADMIN — HYDROMORPHONE HYDROCHLORIDE 0.4 MG: 1 INJECTION, SOLUTION INTRAMUSCULAR; INTRAVENOUS; SUBCUTANEOUS at 11:26

## 2024-08-19 RX ADMIN — HYDROMORPHONE HYDROCHLORIDE 0.4 MG: 1 INJECTION, SOLUTION INTRAMUSCULAR; INTRAVENOUS; SUBCUTANEOUS at 03:30

## 2024-08-19 RX ADMIN — HYDROMORPHONE HYDROCHLORIDE 0.4 MG: 1 INJECTION, SOLUTION INTRAMUSCULAR; INTRAVENOUS; SUBCUTANEOUS at 07:22

## 2024-08-19 RX ADMIN — HYDROMORPHONE HYDROCHLORIDE 0.4 MG: 1 INJECTION, SOLUTION INTRAMUSCULAR; INTRAVENOUS; SUBCUTANEOUS at 20:53

## 2024-08-19 RX ADMIN — EPOETIN ALFA-EPBX 6500 UNITS: 20000 INJECTION, SOLUTION INTRAVENOUS; SUBCUTANEOUS at 09:00

## 2024-08-19 RX ADMIN — PIPERACILLIN SODIUM AND TAZOBACTAM SODIUM 2.25 G: 2; .25 INJECTION, SOLUTION INTRAVENOUS at 11:26

## 2024-08-19 RX ADMIN — DIPHENHYDRAMINE HYDROCHLORIDE 50 MG: 50 INJECTION, SOLUTION INTRAMUSCULAR; INTRAVENOUS at 01:35

## 2024-08-19 RX ADMIN — PIPERACILLIN SODIUM AND TAZOBACTAM SODIUM 2.25 G: 2; .25 INJECTION, SOLUTION INTRAVENOUS at 20:57

## 2024-08-19 ASSESSMENT — PAIN SCALES - GENERAL
PAINLEVEL_OUTOF10: 0 - NO PAIN
PAINLEVEL_OUTOF10: 0 - NO PAIN
PAINLEVEL_OUTOF10: 10 - WORST POSSIBLE PAIN
PAINLEVEL_OUTOF10: 6
PAINLEVEL_OUTOF10: 10 - WORST POSSIBLE PAIN
PAINLEVEL_OUTOF10: 6
PAINLEVEL_OUTOF10: 0 - NO PAIN

## 2024-08-19 ASSESSMENT — COGNITIVE AND FUNCTIONAL STATUS - GENERAL
DAILY ACTIVITIY SCORE: 24
DAILY ACTIVITIY SCORE: 24
MOBILITY SCORE: 24
MOBILITY SCORE: 24

## 2024-08-19 ASSESSMENT — PAIN DESCRIPTION - ORIENTATION
ORIENTATION: LEFT
ORIENTATION: LEFT
ORIENTATION: LEFT;UPPER
ORIENTATION: LEFT;UPPER
ORIENTATION: LEFT

## 2024-08-19 ASSESSMENT — PAIN - FUNCTIONAL ASSESSMENT
PAIN_FUNCTIONAL_ASSESSMENT: 0-10

## 2024-08-19 ASSESSMENT — PAIN DESCRIPTION - LOCATION
LOCATION: CHEST

## 2024-08-19 ASSESSMENT — PAIN DESCRIPTION - DESCRIPTORS
DESCRIPTORS: ACHING
DESCRIPTORS: ACHING
DESCRIPTORS: ACHING;SORE
DESCRIPTORS: SHARP
DESCRIPTORS: ACHING

## 2024-08-19 ASSESSMENT — PAIN SCALES - PAIN ASSESSMENT IN ADVANCED DEMENTIA (PAINAD): TOTALSCORE: MEDICATION (SEE MAR);REPOSITIONED

## 2024-08-19 NOTE — PROGRESS NOTES
"Batsheva Page is a 48 y.o. female on day 1 of admission presenting drainage from dialysis catheter site on the left side of the chest.     Subjective   She reports pain at the dialysis catheter site. She was last dialyzed last Friday 8/16.  He has had previous dialysis catheter infections in the past and also has a history of mitral and aortic valve endocarditis requiring mitral and aortic valve replacements.  She had had a catheter exchanged over a guidewire on 8/14.    Objective     Physical Exam  General: awake, alert, oriented, responsive  Cardiovascular: regular, normal S1 and S2  Chest: there was tenderness and redness at the left chest dialysis catheter insertion site.   Lungs: good air entry bilaterally, clear to auscultation  Abdomen: soft, nontender, bowel sounds present, normoactive  Extremities: no peripheral cyanosis, no pedal edema  Neuro: alert, oriented x 3, no focal weakness      Last Recorded Vitals  Blood pressure (!) 87/40, pulse 65, temperature 36.5 °C (97.7 °F), temperature source Oral, resp. rate 14, height 1.727 m (5' 8\"), weight 70 kg (154 lb 5.2 oz), SpO2 100%.  Intake/Output last 3 Shifts:  I/O last 3 completed shifts:  In: 150 (2.1 mL/kg) [IV Piggyback:150]  Out: - (0 mL/kg)   Weight: 70 kg     Relevant Results  Lab Results   Component Value Date    WBC 6.6 08/19/2024    HGB 9.8 (L) 08/19/2024    HCT 32.3 (L) 08/19/2024    MCV 90 08/19/2024     08/19/2024     Lab Results   Component Value Date    GLUCOSE 99 08/19/2024    CALCIUM 8.0 (L) 08/19/2024     (L) 08/19/2024    K 4.2 08/19/2024    CO2 19 (L) 08/19/2024    CL 87 (L) 08/19/2024    BUN 58 (H) 08/19/2024    CREATININE 10.40 (H) 08/19/2024     Scheduled medications  atorvastatin, 40 mg, oral, Nightly  calcitriol, 0.5 mcg, oral, Daily  epoetin brandy-epbx, 6,500 Units, subcutaneous, Once per day on Monday Wednesday Friday  heparin (porcine), 5,000 Units, subcutaneous, q8h  levETIRAcetam, 750 mg, oral, " Nightly  metoprolol tartrate, 25 mg, oral, BID  mirtazapine, 15 mg, oral, Nightly  piperacillin-tazobactam, 2.25 g, intravenous, q8h  pregabalin, 50 mg, oral, BID  vancomycin (Vancocin) 500 mg in dextrose 5% 100 mL IV, 500 mg, intravenous, Every Mon/Wed/Fri      Continuous medications     PRN medications  PRN medications: acetaminophen **OR** acetaminophen **OR** acetaminophen, benzocaine-menthol, dextromethorphan-guaifenesin, diphenhydrAMINE, guaiFENesin, HYDROmorphone, HYDROmorphone, melatonin, ondansetron ODT **OR** ondansetron, vancomycin      Assessment/Plan   Tunneled dialysis catheter site infection, 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   Recurrent event.  She was admitted in 5/2024 with a dialysis catheter associated infection.  She has very difficult IV access.  The dialysis catheter was removed at that time over a guidewire which was left in place until a new catheter was placed.  Blood cultures are in progress  She is on IV Zosyn and vancomycin.  ID following.    End-stage renal disease  On hemodialysis per nephrology.    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    Seizure disorder  On Keppra.      Jamil Steinberg MD

## 2024-08-19 NOTE — PROGRESS NOTES
Renal addendum    IR is not able to remove dialysis catheter today here at [facility] I discussed the case with the interventional radiologist on-call Dr. Chava Phillip who also said it is impossible to pull the catheter out today and place a small catheter to access her vein and he recommended that patient to be transferred downtown to the [facility] so he can take care of that discussed the case with Dr. May we will set up for transfer [facility] to [facility] I did emphasize to IR that the catheter needs to be removed as soon as possible to avoid septic shock and endocarditis

## 2024-08-19 NOTE — POST-PROCEDURE NOTE
..Report to Receiving RN:    Report To: Kareen NAVA RN  Time Report Called: 2204  Hand-Off Communication: Pt requested to end dialysis early due to pain at catheter site, Dr. Nuvia chew, pt removed 0.8L fluid, Post /37 HR 65.  Complications During Treatment: No  Ultrafiltration Treatment: No  Medications Administered During Dialysis: No  Blood Products Administered During Dialysis: No  Labs Sent During Dialysis: Yes, blood cultures  Heparin Drip Rate Changes: No  Dialysis Catheter Dressing:  yellow drainage noted  Last Dressing Change: 8/19/24    Electronic Signatures:   (Signed)   Authored:    (Signed )   Authored:     Last Updated: 3:41 PM by GERARD FRANKLIN

## 2024-08-19 NOTE — CARE PLAN
I was called by Dr. Nuvia pinedo on the case to initiate transfer of the patient to [facility].  Transfer center was called.  Discussed the case in detail with internal medicine Dr. Manuel Schaefer.  He wanted to discuss the case with Dr. Pedersen, transfer center try to reach Dr. Pedersen on the phone unsuccessfully.  [facility] IR Dr. Chava Phillip was called by transfer center to discuss the case with Dr. Manuel Schaefer.  Plan was to transfer the patient to [facility] to place small catheter for IV access and dialysis as well.  Patient had history of endocarditis.  Both physicians think case is not emergent to be transfer to [facility] despite our concern.  Dr. Phillip  is aware there is no interventional radiologist at [facility] [place] on 8/20/2024 to remove dialysis access.I talked to Dr. Pedersen nephjacob on case.  He is aware patient will not get transferred tonight.

## 2024-08-19 NOTE — H&P
History Of Present Illness  Batsheva Page is a 48 y.o. female presenting with left-sided tunneled HD line pain.  Patient has a history of multiple episodes of sepsis, most recently earlier this spring, in fact being in septic shock requiring ICU care; blood cultures from April 27 grew MRSA at this point.  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  On August 14, the patient had a line exchange with Dr. Boyd at the access center, and she states that it felt a little bit different going in the normal.  The day after, she noticed more pain at the site, severe enough that she discussed with her nephrologist Dr. LIBIA Pedersen who ordered vancomycin for her normal Friday dialysis.  She has had worsening pain in the area with drainage which is malodorous.  Chills yesterday.  No objective fever.  Slight nausea.  In the emergency department, she was given vancomycin and Zosyn.     Past Medical History  She has a past medical history of Aortic valve replaced (2022), CHF (congestive heart failure) (Multi), Chronic pain, Coronary artery disease, Disease of thyroid gland, ESRD (end stage renal disease) (Multi), H/O mitral valve replacement (2013), Heart disease, History of transfusion, Hypertension, Mitral valve regurgitation, Seizures (Multi), and Stroke (Multi).    Surgical History  She has a past surgical history that includes IR CVC tunneled (09/09/2022); IR CVC tunneled (12/28/2022); US guided percutaneous placement (07/14/2022); Parathyroidectomy; Coronary artery bypass graft; Mitral valve replacement (2013); Aortic valve replacement (2020); and Mitral valve replacement (2020).     Social History  She reports that she has never smoked. She has never used smokeless tobacco. She reports that she does not drink alcohol and does not use drugs.    Family History  Family History   Problem Relation Name Age of Onset    No Known Problems Mother      No Known  Problems Father          Allergies  Kayexalate, Metoclopramide hcl, Prochlorperazine, Zofran [ondansetron hcl], and Ondansetron    Review of Systems  Per HPI  Physical Exam  General: [place] female, nontoxic  Eyes: Clear sclera neck: No JVD  Cardio: Regular rate rhythm  Respiratory: Clear to auscultation abdomen: Soft nondistended extremities: No extremity edema currently.  Neuro: Oriented sensory  Skin: Area of erythema edema over the left chest tunneled HD line, very tender to touch.  ENT: Moist mucous membranes  Psychiatric: Appropriate mood and affect  Last Recorded Vitals  /63   Pulse 87   Temp 36.8 °C (98.2 °F) (Oral)   Resp 16   Wt 65 kg (143 lb 4.8 oz)   SpO2 99%     Relevant Results        Results for orders placed or performed during the hospital encounter of 08/18/24 (from the past 24 hour(s))   Magnesium   Result Value Ref Range    Magnesium 1.90 1.60 - 3.10 mg/dL   Blood Gas Lactic Acid, Venous   Result Value Ref Range    POCT Lactate, Venous 1.8 0.4 - 2.0 mmol/L   CBC and Auto Differential   Result Value Ref Range    WBC 7.6 4.4 - 11.3 x10*3/uL    nRBC 0.0 0.0 - 0.0 /100 WBCs    RBC 3.54 (L) 4.00 - 5.20 x10*6/uL    Hemoglobin 9.8 (L) 12.0 - 16.0 g/dL    Hematocrit 31.4 (L) 36.0 - 46.0 %    MCV 89 80 - 100 fL    MCH 27.7 26.0 - 34.0 pg    MCHC 31.2 (L) 32.0 - 36.0 g/dL    RDW 19.6 (H) 11.5 - 14.5 %    Platelets 165 150 - 450 x10*3/uL    Neutrophils % 78.2 40.0 - 80.0 %    Immature Granulocytes %, Automated 0.3 0.0 - 0.9 %    Lymphocytes % 10.3 13.0 - 44.0 %    Monocytes % 9.3 2.0 - 10.0 %    Eosinophils % 1.6 0.0 - 6.0 %    Basophils % 0.3 0.0 - 2.0 %    Neutrophils Absolute 5.91 1.20 - 7.70 x10*3/uL    Immature Granulocytes Absolute, Automated 0.02 0.00 - 0.70 x10*3/uL    Lymphocytes Absolute 0.78 (L) 1.20 - 4.80 x10*3/uL    Monocytes Absolute 0.70 0.10 - 1.00 x10*3/uL    Eosinophils Absolute 0.12 0.00 - 0.70 x10*3/uL    Basophils Absolute 0.02 0.00 - 0.10 x10*3/uL   Comprehensive metabolic  panel   Result Value Ref Range    Glucose 104 (H) 65 - 99 mg/dL    Sodium 130 (L) 133 - 145 mmol/L    Potassium 3.7 3.4 - 5.1 mmol/L    Chloride 84 (L) 97 - 107 mmol/L    Bicarbonate 21 (L) 24 - 31 mmol/L    Urea Nitrogen 54 (H) 8 - 25 mg/dL    Creatinine 9.70 (H) 0.40 - 1.60 mg/dL    eGFR 5 (L) >60 mL/min/1.73m*2    Calcium 8.1 (L) 8.5 - 10.4 mg/dL    Albumin 3.5 3.5 - 5.0 g/dL    Alkaline Phosphatase 82 35 - 125 U/L    Total Protein 7.8 5.9 - 7.9 g/dL    AST 15 5 - 40 U/L    Bilirubin, Total 0.4 0.1 - 1.2 mg/dL    ALT 7 5 - 40 U/L    Anion Gap >19 (H) <=19 mmol/L         Assessment/Plan   Assessment & Plan  Abscess  Physical exam of the site is consistent with infection  Blood cultures have been drawn.  However, 1 was not drawn from the HD line I have ordereded.   Continue vancomycin pharmacy dosing  [facility]  Infectious disease consult    ESRD (end stage renal disease) (Multi)  Monday Wednesday Friday  Labs reviewed, no emergent need for dialysis tonight  Dr. BRITTANY Pedersen is aware of the patient already per ER  Hyponatremia  Expect to improve with dialysis tomorrow  HTN (hypertension)  With the patient's history of septic shock and somewhat borderline low blood pressure currently, I will continue the metoprolol but hold hydralazine and clonidine    Discussed patient's DO NOT RESUSCITATE DO NOT INTUBATE which was established last time, and she again affirms that she does not want to be resuscitated or intubated.  Order has been placed       Everett Lopez, DO

## 2024-08-19 NOTE — CARE PLAN
Problem: Pain - Adult  Goal: Verbalizes/displays adequate comfort level or baseline comfort level  Outcome: Progressing     Problem: Safety - Adult  Goal: Free from fall injury  Outcome: Progressing     Problem: Chronic Conditions and Co-morbidities  Goal: Patient's chronic conditions and co-morbidity symptoms are monitored and maintained or improved  Outcome: Progressing     Problem: Fall/Injury  Goal: Not fall by end of shift  Outcome: Progressing  Goal: Be free from injury by end of the shift  Outcome: Progressing  Goal: Verbalize understanding of personal risk factors for fall in the hospital  Outcome: Progressing  Goal: Verbalize understanding of risk factor reduction measures to prevent injury from fall in the home  Outcome: Progressing  Goal: Use assistive devices by end of the shift  Outcome: Progressing  Goal: Pace activities to prevent fatigue by end of the shift  Outcome: Progressing     Problem: Pain  Goal: Takes deep breaths with improved pain control throughout the shift  Outcome: Progressing  Goal: Turns in bed with improved pain control throughout the shift  Outcome: Progressing  Goal: Walks with improved pain control throughout the shift  Outcome: Progressing  Goal: Performs ADL's with improved pain control throughout shift  Outcome: Progressing  Goal: Participates in PT with improved pain control throughout the shift  Outcome: Progressing  Goal: Free from opioid side effects throughout the shift  Outcome: Progressing  Goal: Free from acute confusion related to pain meds throughout the shift  Outcome: Progressing   The patient's goals for the shift include      The clinical goals for the shift include pain management

## 2024-08-19 NOTE — CONSULTS
INFECTIOUS DISEASES CONSULT NOTE    Referring Physician: Jamil Steinberg  Reason For Consult: TDC tunnel infection  Date of Consult: 8/19/24 at 1033    History Of Present Illness  Patient is a 48-year-old female with end-stage renal disease on chronic hemodialysis through a left IJ TDC which is her last remaining IV access.  Patient has extensive history of recurrent MRSA septicemia related to this dialysis catheter as well as mitral and aortic valve endocarditis requiring aortic and mitral valve replacements.  Patient states on 8/14/24 she had her left TDC exchanged over a guidewire.  Within 24 hours she began to develop increasing pain and swelling and redness over the tunnel site.  Over the weekend she began draining gross pus from the tunnel site and presented to the [facility] emergency room on 8/18/2024.  On arrival she was afebrile at 36.8 with a white blood cell count of 6.6.  Blood cultures x 2 sets were obtained peripherally and 1 set was drawn from her line.  No swab culture was obtained from drainage around the tunnel site.  Patient was started on Zosyn and vancomycin.     Past Medical History  Recurrent MRSA line associated septicemia  Aortic and mitral valve replacements  Coronary artery disease   History of seizures  Chronic renal failure with extremely limited IV access    Surgical History  She has a past surgical history that includes IR CVC tunneled (09/09/2022); IR CVC tunneled (12/28/2022); US guided percutaneous placement (07/14/2022); Parathyroidectomy; Coronary artery bypass graft; Mitral valve replacement (2013); Aortic valve replacement (2020); and Mitral valve replacement (2020).     Social History  Denies smoking, alcohol, drug use    Family History  No family exposure to known communicable illnesses  No family history of tuberculosis    Allergies  Kayexalate, Metoclopramide hcl, Prochlorperazine, Zofran [ondansetron hcl], and Ondansetron     Medications  Zosyn D1  Vanco D1  See Epic  for remaining medications.    Review of Systems  Detailed review of systems completed.  No significant additional positives beyond what is mentioned above    Physical Exam  Vital signs:  Visit Vitals  BP (!) 87/40 (BP Location: Right leg, Patient Position: Lying) Comment: nurse notified   Pulse 65   Temp 36.5 °C (97.7 °F) (Oral)   Resp 14      General: Middle-age female resting comfortably no distress.  HEENT:  No scleral icterus or conjunctival suffusion, throat clear, neck supple, no cervical LAD.  Left IJ TDC with significant erythema and tenderness.  There is gross purulence draining from the tunnel site.  Lungs:  Clear to auscultation bilaterally  Heart:  RRR, S1, S2 normal, no extra sounds or murmurs.  Abdomen:  Normoactive BS, soft, NT/ND. No palpable organs or masses.  No peritoneal signs.  Extremities:  No erythema/rash    Relevant Lab Results  Results from last 72 hours   Lab Units 08/19/24  0548   WBC AUTO x10*3/uL 6.6   HEMOGLOBIN g/dL 9.8*   HEMATOCRIT % 32.3*   PLATELETS AUTO x10*3/uL 165     Results from last 72 hours   Lab Units 08/19/24  0548   CREATININE mg/dL 10.40*   EGFR mL/min/1.73m*2 4*     Results from last 72 hours   Lab Units 08/18/24  2058   AST U/L 15   ALT U/L 7   ALK PHOS U/L 82   BILIRUBIN TOTAL mg/dL 0.4     Cultures  Blood culture (8/18): X 2 sets peripherally)-NGTD  Blood culture (8/18 from TDC): X 1 set-pending    Impression:  1.  TDC associated tunnel infection  -- Patient recently had her TDC changed over guidewire on 8/14/2024.  Shortly thereafter she began to develop increasing pain, erythema and more recently gross pus draining from the tunnel site.  -- This is the patient's last available access site for dialysis catheter.    Plan:  1.  Will continue Zosyn.  Monitor for adverse antibiotic events.  2.  Continue vancomycin with dialysis.  Monitor creatinine and vancomycin levels toxicity.  3.  Will obtain a swab culture of gross purulence from the TDC tunnel site.  4.  Patient  will need removal of TDC today with irrigation of tunnel site given gross purulent drainage.  5.  Await pending blood cultures  6.  Patient's condition is guarded given lack of IV access sites for ongoing dialysis and remaining site is grossly infected at this time.  Discussed at length with Dr. Zhou Pedersen.  Will follow.      Esa Hi MD  [facility]  [phone]

## 2024-08-19 NOTE — ASSESSMENT & PLAN NOTE
With the patient's history of septic shock and somewhat borderline low blood pressure currently, I will continue the metoprolol but hold hydralazine and clonidine

## 2024-08-19 NOTE — CONSULTS
Vancomycin Dosing by Pharmacy- INITIAL (HEMODIALYSIS)    Batsheva Page is a 48 y.o. year old female who Pharmacy has been consulted for vancomycin dosing for Vancomycin Indications: Other: line infection . Based on the patient's indication and renal status this patient will be dosed based on a Pre-HD level of 20-25 mcg/mL.     Patient is currently on hemodialysis MWF     Visit Vitals  /65   Pulse 87   Temp 36.8 °C (98.2 °F) (Oral)   Resp 16           Lab Results   Component Value Date    CREATININE 9.70 (H) 08/18/2024    CREATININE 10.30 (H) 05/06/2024    CREATININE 8.80 (H) 05/05/2024    CREATININE 6.70 (H) 05/04/2024       No intake/output data recorded.    Assessment/Plan     Initial Loading Dosing:has already been given a loading dose of 2000 mg   Vancomycin maintenance dose: 500 mg after each dialysis session MWF  Pre-HD level will be obtained on 8/21/24 at 0500. May be obtained sooner if clinically indicated.   Will continue to monitor renal function daily while on vancomycin and order serum creatinine at least every 48 hours if not already ordered.  Follow for continued vancomycin needs, clinical response, and signs/symptoms of toxicity.     Sarah Norton, [facility]

## 2024-08-19 NOTE — ASSESSMENT & PLAN NOTE
Detail Level: Zone Physical exam of the site is consistent with infection  Blood cultures have been drawn.  However, 1 was not drawn from the HD line I have ordereded.   Continue vancomycin pharmacy dosing  [facility]  Infectious disease consult

## 2024-08-19 NOTE — ASSESSMENT & PLAN NOTE
Monday Wednesday Friday  Labs reviewed, no emergent need for dialysis tonight  Dr. BRITTANY Pedersen is aware of the patient already per ER

## 2024-08-19 NOTE — PRE-PROCEDURE NOTE
..Report from Sending RN:    Report From: Kareen NAVA RN  Recent Surgery of Procedure: No  Baseline Level of Consciousness (LOC): A&O  Oxygen Use: No  Type: n/a  Diabetic: No  Last BP Med Given Day of Dialysis: see MAR  Last Pain Med Given: see MAR  Lab Tests to be Obtained with Dialysis: yes  Blood Transfusion to be Given During Dialysis: No  Available IV Access: Yes  Medications to be Administered During Dialysis: No  Continuous IV Infusion Running: No  Restraints on Currently or in the Last 24 Hours: No  Hand-Off Communication: pt ok to come for dialysis  Dialysis Catheter Dressing: will access  Last Dressing Change: will access

## 2024-08-19 NOTE — CONSULTS
.Reason For Consult  End-stage kidney disease and dialysis therapy    History Of Present Illness  Batsheva Page is a 48 y.o. female who is very known to my service with end-stage kidney disease on dialysis Monday Wednesday Friday, she also history of aortic valve replacement, congestive heart failure and coronary artery disease, she had multiple admissions to the hospital because of bacteremia secondary to line infection unfortunately all her veins are not valid for use anymore as far as dialysis catheters insertions she only has left IJ left for catheter insertion and dialysis currently the patient had pulled the half of the catheter out went for a catheter exchange on Thursday she had a gram of vancomycin afterwards however she noted that the site was becoming swollen red and started draining pus she called me yesterday to her immediately go to the emergency room started on IV antibiotics ID consult was obtained the patient is complaining of severe pain on the side of the catheter however there is no fever no chills and no leukocytosis     Review of Systems  10 point review of system was done all negative except was medical history present illness    Past Medical History  She has a past medical history of Aortic valve replaced (2022), CHF (congestive heart failure) (Multi), Chronic pain, Coronary artery disease, Disease of thyroid gland, ESRD (end stage renal disease) (Multi), H/O mitral valve replacement (2013), Heart disease, History of transfusion, Hypertension, Mitral valve regurgitation, Seizures (Multi), and Stroke (Multi).    Surgical History  She has a past surgical history that includes IR CVC tunneled (09/09/2022); IR CVC tunneled (12/28/2022); US guided percutaneous placement (07/14/2022); Parathyroidectomy; Coronary artery bypass graft; Mitral valve replacement (2013); Aortic valve replacement (2020); and Mitral valve replacement (2020).     Social History  She reports that she has never smoked.  She has never used smokeless tobacco. She reports that she does not drink alcohol and does not use drugs.    Family History  Family History   Problem Relation Name Age of Onset    No Known Problems Mother      No Known Problems Father          Current Facility-Administered Medications:     acetaminophen (Tylenol) tablet 650 mg, 650 mg, oral, q4h PRN **OR** acetaminophen (Tylenol) oral liquid 650 mg, 650 mg, nasogastric tube, q4h PRN **OR** acetaminophen (Tylenol) suppository 650 mg, 650 mg, rectal, q4h PRN, Everett Lopez DO    atorvastatin (Lipitor) tablet 40 mg, 40 mg, oral, Nightly, Everett Lopez DO    benzocaine-menthol (Cepastat Sore Throat) lozenge 1 lozenge, 1 lozenge, Mouth/Throat, q2h PRN, Everett Lopez DO    calcitriol (Rocaltrol) capsule 0.5 mcg, 0.5 mcg, oral, Daily, Everett Lopez DO, 0.5 mcg at 08/19/24 0846    dextromethorphan-guaifenesin (Robitussin DM)  mg/5 mL oral liquid 5 mL, 5 mL, oral, q4h PRN, Everett Lopez DO    diphenhydrAMINE (BENADryl) injection 25 mg, 25 mg, intravenous, q6h PRN, Jamil Steinberg MD, 25 mg at 08/19/24 1126    epoetin brandy-epbx (RETACRIT) injection 6,500 Units, 6,500 Units, subcutaneous, Once per day on Monday Wednesday Friday, Everett Lopez DO, 6,500 Units at 08/19/24 0900    guaiFENesin (Mucinex) 12 hr tablet 600 mg, 600 mg, oral, q12h PRN, Everett Lopez DO    heparin (porcine) injection 5,000 Units, 5,000 Units, subcutaneous, q8h, Everett Lopez DO    HYDROmorphone (Dilaudid) injection 0.2 mg, 0.2 mg, intravenous, q3h PRN, Everett Lopez DO    HYDROmorphone (Dilaudid) injection 0.4 mg, 0.4 mg, intravenous, q3h PRN, Everett Lopez, , 0.4 mg at 08/19/24 1126    levETIRAcetam (Keppra) tablet 750 mg, 750 mg, oral, Nightly, Everett Lopez,     melatonin tablet 5 mg, 5 mg, oral, Nightly PRN, Everett Lopez, DO    metoprolol tartrate (Lopressor) tablet 25 mg, 25 mg, oral, BID, Everett Lopez,  DO    mirtazapine (Remeron) tablet 15 mg, 15 mg, oral, Nightly, Everett Manleyte, DO    ondansetron ODT (Zofran-ODT) disintegrating tablet 4 mg, 4 mg, oral, q8h PRN **OR** ondansetron (Zofran) injection 4 mg, 4 mg, intravenous, q8h PRN, Everett Manleyte, DO    piperacillin-tazobactam (Zosyn) 2.25 g in dextrose (iso) IV 50 mL, 2.25 g, intravenous, q8h, Everett Cabellolotte, DO, Last Rate: 0 mL/hr at 08/19/24 0609, 2.25 g at 08/19/24 1126    pregabalin (Lyrica) capsule 50 mg, 50 mg, oral, BID, Everett Lopez, DO, 50 mg at 08/19/24 0846    vancomycin (Vancocin) 500 mg in dextrose 5% 100 mL IV, 500 mg, intravenous, Every Mon/Wed/Fri, Everett Lopez, DO    vancomycin (Vancocin) pharmacy to dose - pharmacy monitoring, , miscellaneous, Daily PRN, Everett Manleyte, DO   Allergies  Kayexalate, Metoclopramide hcl, Prochlorperazine, Zofran [ondansetron hcl], and Ondansetron         Physical Exam  Physical Exam  Constitutional:       General: She is not in acute distress.     Appearance: She is not toxic-appearing.   HENT:      Head: Normocephalic and atraumatic.   Eyes:      Extraocular Movements: Extraocular movements intact.      Pupils: Pupils are equal, round, and reactive to light.   Neck:      Vascular: No carotid bruit.   Cardiovascular:      Rate and Rhythm: Normal rate and regular rhythm.   Pulmonary:      Effort: No respiratory distress.      Breath sounds: No stridor. No wheezing, rhonchi or rales.   Chest:      Chest wall: No tenderness.   Abdominal:      General: There is no distension.      Palpations: There is no mass.      Tenderness: There is no abdominal tenderness. There is no right CVA tenderness, left CVA tenderness or guarding.      Hernia: No hernia is present.   Musculoskeletal:         General: No swelling or tenderness.      Cervical back: No rigidity.      Right lower leg: No edema.      Left lower leg: No edema.   Lymphadenopathy:      Cervical: No cervical adenopathy.   Skin:      "General: Skin is warm and dry.      Coloration: Skin is not jaundiced or pale.      Findings: No bruising or erythema.      Comments: The skin over the dialysis catheter is red and there is a purulent drainage and tender   Neurological:      General: No focal deficit present.      Mental Status: She is alert.              I&O 24HR    Intake/Output Summary (Last 24 hours) at 8/19/2024 1132  Last data filed at 8/19/2024 1033  Gross per 24 hour   Intake 270 ml   Output --   Net 270 ml       Vitals 24HR  Heart Rate:  []   Temp:  [36.4 °C (97.5 °F)-36.8 °C (98.2 °F)]   Resp:  [14-18]   BP: ()/(37-65)   Height:  [172.7 cm (5' 8\")]   Weight:  [65 kg (143 lb 4.8 oz)-70 kg (154 lb 5.2 oz)]   SpO2:  [99 %-100 %]     Relevant Results        Results for orders placed or performed during the hospital encounter of 08/18/24 (from the past 96 hour(s))   Magnesium   Result Value Ref Range    Magnesium 1.90 1.60 - 3.10 mg/dL   Blood Gas Lactic Acid, Venous   Result Value Ref Range    POCT Lactate, Venous 1.8 0.4 - 2.0 mmol/L   Blood Culture    Specimen: Peripheral Venipuncture; Blood culture   Result Value Ref Range    Blood Culture Loaded on Instrument - Culture in progress    Blood Culture    Specimen: Peripheral Venipuncture; Blood culture   Result Value Ref Range    Blood Culture Loaded on Instrument - Culture in progress    Sars-CoV-2 PCR   Result Value Ref Range    Coronavirus 2019, PCR Not Detected Not Detected   CBC and Auto Differential   Result Value Ref Range    WBC 7.6 4.4 - 11.3 x10*3/uL    nRBC 0.0 0.0 - 0.0 /100 WBCs    RBC 3.54 (L) 4.00 - 5.20 x10*6/uL    Hemoglobin 9.8 (L) 12.0 - 16.0 g/dL    Hematocrit 31.4 (L) 36.0 - 46.0 %    MCV 89 80 - 100 fL    MCH 27.7 26.0 - 34.0 pg    MCHC 31.2 (L) 32.0 - 36.0 g/dL    RDW 19.6 (H) 11.5 - 14.5 %    Platelets 165 150 - 450 x10*3/uL    Neutrophils % 78.2 40.0 - 80.0 %    Immature Granulocytes %, Automated 0.3 0.0 - 0.9 %    Lymphocytes % 10.3 13.0 - 44.0 %    " Monocytes % 9.3 2.0 - 10.0 %    Eosinophils % 1.6 0.0 - 6.0 %    Basophils % 0.3 0.0 - 2.0 %    Neutrophils Absolute 5.91 1.20 - 7.70 x10*3/uL    Immature Granulocytes Absolute, Automated 0.02 0.00 - 0.70 x10*3/uL    Lymphocytes Absolute 0.78 (L) 1.20 - 4.80 x10*3/uL    Monocytes Absolute 0.70 0.10 - 1.00 x10*3/uL    Eosinophils Absolute 0.12 0.00 - 0.70 x10*3/uL    Basophils Absolute 0.02 0.00 - 0.10 x10*3/uL   Comprehensive metabolic panel   Result Value Ref Range    Glucose 104 (H) 65 - 99 mg/dL    Sodium 130 (L) 133 - 145 mmol/L    Potassium 3.7 3.4 - 5.1 mmol/L    Chloride 84 (L) 97 - 107 mmol/L    Bicarbonate 21 (L) 24 - 31 mmol/L    Urea Nitrogen 54 (H) 8 - 25 mg/dL    Creatinine 9.70 (H) 0.40 - 1.60 mg/dL    eGFR 5 (L) >60 mL/min/1.73m*2    Calcium 8.1 (L) 8.5 - 10.4 mg/dL    Albumin 3.5 3.5 - 5.0 g/dL    Alkaline Phosphatase 82 35 - 125 U/L    Total Protein 7.8 5.9 - 7.9 g/dL    AST 15 5 - 40 U/L    Bilirubin, Total 0.4 0.1 - 1.2 mg/dL    ALT 7 5 - 40 U/L    Anion Gap >19 (H) <=19 mmol/L   Renal Function Panel   Result Value Ref Range    Glucose 99 65 - 99 mg/dL    Sodium 132 (L) 133 - 145 mmol/L    Potassium 4.2 3.4 - 5.1 mmol/L    Chloride 87 (L) 97 - 107 mmol/L    Bicarbonate 19 (L) 24 - 31 mmol/L    Urea Nitrogen 58 (H) 8 - 25 mg/dL    Creatinine 10.40 (H) 0.40 - 1.60 mg/dL    eGFR 4 (L) >60 mL/min/1.73m*2    Calcium 8.0 (L) 8.5 - 10.4 mg/dL    Phosphorus 6.1 (H) 2.5 - 4.5 mg/dL    Albumin 3.3 (L) 3.5 - 5.0 g/dL    Anion Gap >19 (H) <=19 mmol/L   CBC and Auto Differential   Result Value Ref Range    WBC 6.6 4.4 - 11.3 x10*3/uL    nRBC 0.0 0.0 - 0.0 /100 WBCs    RBC 3.58 (L) 4.00 - 5.20 x10*6/uL    Hemoglobin 9.8 (L) 12.0 - 16.0 g/dL    Hematocrit 32.3 (L) 36.0 - 46.0 %    MCV 90 80 - 100 fL    MCH 27.4 26.0 - 34.0 pg    MCHC 30.3 (L) 32.0 - 36.0 g/dL    RDW 19.9 (H) 11.5 - 14.5 %    Platelets 165 150 - 450 x10*3/uL    Neutrophils % 65.0 40.0 - 80.0 %    Immature Granulocytes %, Automated 0.2 0.0 - 0.9 %     Lymphocytes % 18.6 13.0 - 44.0 %    Monocytes % 11.3 2.0 - 10.0 %    Eosinophils % 4.4 0.0 - 6.0 %    Basophils % 0.5 0.0 - 2.0 %    Neutrophils Absolute 4.32 1.20 - 7.70 x10*3/uL    Immature Granulocytes Absolute, Automated 0.01 0.00 - 0.70 x10*3/uL    Lymphocytes Absolute 1.23 1.20 - 4.80 x10*3/uL    Monocytes Absolute 0.75 0.10 - 1.00 x10*3/uL    Eosinophils Absolute 0.29 0.00 - 0.70 x10*3/uL    Basophils Absolute 0.03 0.00 - 0.10 x10*3/uL      Impression:  End-stage kidney disease dialysis Monday Wednesday Friday  Infected dialysis catheter with abscess formation  Status post aortic valve placement  History of endocarditis  History of recurrent admissions for bacteremia secondary to line infection  Anemia of chronic kidney disease  Hypertension    Recommendations:  Dialysis catheter has to be removed today I will consult interventional radiology Dr. Soler who is very familiar with her case because if she needs her catheter removed she needs a guidewire to be placed because that is the only access the patient could have for dialysis  I will dialyze the patient today for 3 hours then remove the catheter and give a line holiday for 3 to 4 days  Continue home medications  Epogen therapy  Infectious disease to handle antibiotic treatment cussed with Dr. Michelle covering for Dr. Brittany Pedersen MDInpatient consult to [facility]  Consult performed by: Zhou Pedersen MD  Consult ordered by: Everett Lopez DO

## 2024-08-19 NOTE — CARE PLAN
Interventional Radiology:  I was consulted to remove infected dialysis catheter on this patient for infection. Patient is currently clinically and hemodynamically stable, not currently septic although at high risk for that based on medical history. Purulent discharge is noted for dialysis catheter insertion site.   Patient also is known to have difficult IV access. Discussed case with Dr. Zhou Pedersen, the nephrologist taking care for patient and we agree that best plan for patient is to exchange dialysis catheter for a small bore catheter while on IV antibiotics to address infection and also maintain IV access for future dialysis catheter replacement.  We put patient on call although patient needs MAC anesthesia and is on dialysis for the next 2 hours.   Dr. Zhou Pedersen think thinks this needs to be emergently done as per medical history and doesn't think we should postpone which is understandable. I will sign out case Dr. Siddhartha Phillip, the IR on call to be addressed after hours.     Thank you again for consultation and thanks for allowing me to participate in [facility] care of this patient.

## 2024-08-19 NOTE — CARE PLAN
The patient's goals for the shift include      The clinical goals for the shift include pain management    Over the shift, the patient did not make progress toward the following goals.   Problem: Pain - Adult  Goal: Verbalizes/displays adequate comfort level or baseline comfort level  Outcome: Progressing     Problem: Safety - Adult  Goal: Free from fall injury  Outcome: Progressing     Problem: Discharge Planning  Goal: Discharge to home or other facility with appropriate resources  Outcome: Progressing     Problem: Chronic Conditions and Co-morbidities  Goal: Patient's chronic conditions and co-morbidity symptoms are monitored and maintained or improved  Outcome: Progressing     Problem: Fall/Injury  Goal: Not fall by end of shift  Outcome: Progressing  Goal: Be free from injury by end of the shift  Outcome: Progressing  Goal: Verbalize understanding of personal risk factors for fall in the hospital  Outcome: Progressing  Goal: Verbalize understanding of risk factor reduction measures to prevent injury from fall in the home  Outcome: Progressing  Goal: Use assistive devices by end of the shift  Outcome: Progressing  Goal: Pace activities to prevent fatigue by end of the shift  Outcome: Progressing     Problem: Pain  Goal: Takes deep breaths with improved pain control throughout the shift  Outcome: Progressing  Goal: Turns in bed with improved pain control throughout the shift  Outcome: Progressing  Goal: Walks with improved pain control throughout the shift  Outcome: Progressing  Goal: Performs ADL's with improved pain control throughout shift  Outcome: Progressing  Goal: Participates in PT with improved pain control throughout the shift  Outcome: Progressing  Goal: Free from opioid side effects throughout the shift  Outcome: Progressing  Goal: Free from acute confusion related to pain meds throughout the shift  Outcome: Progressing

## 2024-08-20 ENCOUNTER — APPOINTMENT (OUTPATIENT)
Dept: CARDIOLOGY | Facility: HOSPITAL | Age: 49
DRG: 314 | End: 2024-08-20
Payer: MEDICARE

## 2024-08-20 ENCOUNTER — ANESTHESIA EVENT (OUTPATIENT)
Dept: CARDIOLOGY | Facility: HOSPITAL | Age: 49
End: 2024-08-20
Payer: MEDICARE

## 2024-08-20 ENCOUNTER — ANESTHESIA (OUTPATIENT)
Dept: CARDIOLOGY | Facility: HOSPITAL | Age: 49
End: 2024-08-20
Payer: MEDICARE

## 2024-08-20 LAB
ALBUMIN SERPL-MCNC: 3.3 G/DL (ref 3.5–5)
ANION GAP SERPL CALC-SCNC: >19 MMOL/L
BASOPHILS # BLD AUTO: 0.04 X10*3/UL (ref 0–0.1)
BASOPHILS NFR BLD AUTO: 0.7 %
BUN SERPL-MCNC: 40 MG/DL (ref 8–25)
CALCIUM SERPL-MCNC: 8.5 MG/DL (ref 8.5–10.4)
CHLORIDE SERPL-SCNC: 93 MMOL/L (ref 97–107)
CO2 SERPL-SCNC: 22 MMOL/L (ref 24–31)
CREAT SERPL-MCNC: 7.9 MG/DL (ref 0.4–1.6)
EGFRCR SERPLBLD CKD-EPI 2021: 6 ML/MIN/1.73M*2
EOSINOPHIL # BLD AUTO: 0.33 X10*3/UL (ref 0–0.7)
EOSINOPHIL NFR BLD AUTO: 6.1 %
ERYTHROCYTE [DISTWIDTH] IN BLOOD BY AUTOMATED COUNT: 19.9 % (ref 11.5–14.5)
GLUCOSE SERPL-MCNC: 91 MG/DL (ref 65–99)
HCT VFR BLD AUTO: 34.8 % (ref 36–46)
HGB BLD-MCNC: 10.9 G/DL (ref 12–16)
IMM GRANULOCYTES # BLD AUTO: 0.02 X10*3/UL (ref 0–0.7)
IMM GRANULOCYTES NFR BLD AUTO: 0.4 % (ref 0–0.9)
LYMPHOCYTES # BLD AUTO: 0.93 X10*3/UL (ref 1.2–4.8)
LYMPHOCYTES NFR BLD AUTO: 17.1 %
MCH RBC QN AUTO: 27.9 PG (ref 26–34)
MCHC RBC AUTO-ENTMCNC: 31.3 G/DL (ref 32–36)
MCV RBC AUTO: 89 FL (ref 80–100)
MONOCYTES # BLD AUTO: 0.61 X10*3/UL (ref 0.1–1)
MONOCYTES NFR BLD AUTO: 11.2 %
NEUTROPHILS # BLD AUTO: 3.52 X10*3/UL (ref 1.2–7.7)
NEUTROPHILS NFR BLD AUTO: 64.5 %
NRBC BLD-RTO: 0 /100 WBCS (ref 0–0)
PHOSPHATE SERPL-MCNC: 5.3 MG/DL (ref 2.5–4.5)
PLATELET # BLD AUTO: 159 X10*3/UL (ref 150–450)
POTASSIUM SERPL-SCNC: 5 MMOL/L (ref 3.4–5.1)
RBC # BLD AUTO: 3.91 X10*6/UL (ref 4–5.2)
SODIUM SERPL-SCNC: 136 MMOL/L (ref 133–145)
WBC # BLD AUTO: 5.5 X10*3/UL (ref 4.4–11.3)

## 2024-08-20 PROCEDURE — 3700000001 HC GENERAL ANESTHESIA TIME - INITIAL BASE CHARGE

## 2024-08-20 PROCEDURE — 36556 INSERT NON-TUNNEL CV CATH: CPT

## 2024-08-20 PROCEDURE — 2500000005 HC RX 250 GENERAL PHARMACY W/O HCPCS: Performed by: STUDENT IN AN ORGANIZED HEALTH CARE EDUCATION/TRAINING PROGRAM

## 2024-08-20 PROCEDURE — 36589 REMOVAL TUNNELED CV CATH: CPT | Performed by: STUDENT IN AN ORGANIZED HEALTH CARE EDUCATION/TRAINING PROGRAM

## 2024-08-20 PROCEDURE — 99232 SBSQ HOSP IP/OBS MODERATE 35: CPT | Performed by: INTERNAL MEDICINE

## 2024-08-20 PROCEDURE — 3700000002 HC GENERAL ANESTHESIA TIME - EACH INCREMENTAL 1 MINUTE

## 2024-08-20 PROCEDURE — 2500000004 HC RX 250 GENERAL PHARMACY W/ HCPCS (ALT 636 FOR OP/ED): Performed by: INTERNAL MEDICINE

## 2024-08-20 PROCEDURE — 2780000003 HC OR 278 NO HCPCS

## 2024-08-20 PROCEDURE — 7100000002 HC RECOVERY ROOM TIME - EACH INCREMENTAL 1 MINUTE

## 2024-08-20 PROCEDURE — 2500000002 HC RX 250 W HCPCS SELF ADMINISTERED DRUGS (ALT 637 FOR MEDICARE OP, ALT 636 FOR OP/ED): Performed by: INTERNAL MEDICINE

## 2024-08-20 PROCEDURE — 36415 COLL VENOUS BLD VENIPUNCTURE: CPT | Performed by: INTERNAL MEDICINE

## 2024-08-20 PROCEDURE — 36590 REMOVAL TUNNELED CV CATH: CPT | Performed by: STUDENT IN AN ORGANIZED HEALTH CARE EDUCATION/TRAINING PROGRAM

## 2024-08-20 PROCEDURE — 1200000002 HC GENERAL ROOM WITH TELEMETRY DAILY

## 2024-08-20 PROCEDURE — 36558 INSERT TUNNELED CV CATH: CPT | Performed by: STUDENT IN AN ORGANIZED HEALTH CARE EDUCATION/TRAINING PROGRAM

## 2024-08-20 PROCEDURE — 85025 COMPLETE CBC W/AUTO DIFF WBC: CPT | Performed by: INTERNAL MEDICINE

## 2024-08-20 PROCEDURE — 2720000007 HC OR 272 NO HCPCS

## 2024-08-20 PROCEDURE — 2500000004 HC RX 250 GENERAL PHARMACY W/ HCPCS (ALT 636 FOR OP/ED): Performed by: ANESTHESIOLOGIST ASSISTANT

## 2024-08-20 PROCEDURE — 77001 FLUOROGUIDE FOR VEIN DEVICE: CPT

## 2024-08-20 PROCEDURE — 7100000001 HC RECOVERY ROOM TIME - INITIAL BASE CHARGE

## 2024-08-20 PROCEDURE — 36556 INSERT NON-TUNNEL CV CATH: CPT | Performed by: STUDENT IN AN ORGANIZED HEALTH CARE EDUCATION/TRAINING PROGRAM

## 2024-08-20 PROCEDURE — C1752 CATH,HEMODIALYSIS,SHORT-TERM: HCPCS

## 2024-08-20 PROCEDURE — 2500000004 HC RX 250 GENERAL PHARMACY W/ HCPCS (ALT 636 FOR OP/ED): Performed by: NURSE PRACTITIONER

## 2024-08-20 PROCEDURE — 87070 CULTURE OTHR SPECIMN AEROBIC: CPT | Mod: WESLAB | Performed by: INTERNAL MEDICINE

## 2024-08-20 PROCEDURE — 2500000005 HC RX 250 GENERAL PHARMACY W/O HCPCS: Performed by: ANESTHESIOLOGIST ASSISTANT

## 2024-08-20 PROCEDURE — 76937 US GUIDE VASCULAR ACCESS: CPT | Performed by: STUDENT IN AN ORGANIZED HEALTH CARE EDUCATION/TRAINING PROGRAM

## 2024-08-20 PROCEDURE — C1894 INTRO/SHEATH, NON-LASER: HCPCS

## 2024-08-20 PROCEDURE — 77001 FLUOROGUIDE FOR VEIN DEVICE: CPT | Performed by: STUDENT IN AN ORGANIZED HEALTH CARE EDUCATION/TRAINING PROGRAM

## 2024-08-20 PROCEDURE — 2500000001 HC RX 250 WO HCPCS SELF ADMINISTERED DRUGS (ALT 637 FOR MEDICARE OP): Performed by: INTERNAL MEDICINE

## 2024-08-20 PROCEDURE — 2500000004 HC RX 250 GENERAL PHARMACY W/ HCPCS (ALT 636 FOR OP/ED): Performed by: STUDENT IN AN ORGANIZED HEALTH CARE EDUCATION/TRAINING PROGRAM

## 2024-08-20 PROCEDURE — 80069 RENAL FUNCTION PANEL: CPT | Performed by: INTERNAL MEDICINE

## 2024-08-20 PROCEDURE — 36590 REMOVAL TUNNELED CV CATH: CPT

## 2024-08-20 PROCEDURE — 76937 US GUIDE VASCULAR ACCESS: CPT

## 2024-08-20 PROCEDURE — C1769 GUIDE WIRE: HCPCS

## 2024-08-20 RX ORDER — HYDROMORPHONE HYDROCHLORIDE 0.2 MG/ML
0.2 INJECTION INTRAMUSCULAR; INTRAVENOUS; SUBCUTANEOUS EVERY 5 MIN PRN
Status: DISCONTINUED | OUTPATIENT
Start: 2024-08-20 | End: 2024-08-21 | Stop reason: WASHOUT

## 2024-08-20 RX ORDER — FENTANYL CITRATE 50 UG/ML
50 INJECTION, SOLUTION INTRAMUSCULAR; INTRAVENOUS EVERY 5 MIN PRN
Status: DISCONTINUED | OUTPATIENT
Start: 2024-08-20 | End: 2024-08-21 | Stop reason: WASHOUT

## 2024-08-20 RX ORDER — ALBUTEROL SULFATE 0.83 MG/ML
2.5 SOLUTION RESPIRATORY (INHALATION) EVERY 30 MIN PRN
Status: DISCONTINUED | OUTPATIENT
Start: 2024-08-20 | End: 2024-08-21 | Stop reason: WASHOUT

## 2024-08-20 RX ORDER — IPRATROPIUM BROMIDE 0.5 MG/2.5ML
500 SOLUTION RESPIRATORY (INHALATION) EVERY 30 MIN PRN
Status: DISCONTINUED | OUTPATIENT
Start: 2024-08-20 | End: 2024-08-21 | Stop reason: WASHOUT

## 2024-08-20 RX ORDER — LABETALOL HYDROCHLORIDE 5 MG/ML
5 INJECTION, SOLUTION INTRAVENOUS EVERY 5 MIN PRN
Status: DISCONTINUED | OUTPATIENT
Start: 2024-08-20 | End: 2024-08-21 | Stop reason: WASHOUT

## 2024-08-20 RX ORDER — PROPOFOL 10 MG/ML
INJECTION, EMULSION INTRAVENOUS CONTINUOUS PRN
Status: DISCONTINUED | OUTPATIENT
Start: 2024-08-20 | End: 2024-08-20

## 2024-08-20 RX ORDER — PHENYLEPHRINE HCL IN 0.9% NACL 1 MG/10 ML
SYRINGE (ML) INTRAVENOUS AS NEEDED
Status: DISCONTINUED | OUTPATIENT
Start: 2024-08-20 | End: 2024-08-20

## 2024-08-20 RX ORDER — MIDAZOLAM HYDROCHLORIDE 1 MG/ML
INJECTION, SOLUTION INTRAMUSCULAR; INTRAVENOUS AS NEEDED
Status: DISCONTINUED | OUTPATIENT
Start: 2024-08-20 | End: 2024-08-20

## 2024-08-20 RX ORDER — LIDOCAINE HYDROCHLORIDE 10 MG/ML
INJECTION, SOLUTION EPIDURAL; INFILTRATION; INTRACAUDAL; PERINEURAL AS NEEDED
Status: DISCONTINUED | OUTPATIENT
Start: 2024-08-20 | End: 2024-08-29 | Stop reason: HOSPADM

## 2024-08-20 RX ORDER — DEXTROSE MONOHYDRATE AND SODIUM CHLORIDE 5; .45 G/100ML; G/100ML
50 INJECTION, SOLUTION INTRAVENOUS CONTINUOUS
Status: DISCONTINUED | OUTPATIENT
Start: 2024-08-20 | End: 2024-08-21 | Stop reason: WASHOUT

## 2024-08-20 RX ORDER — HEPARIN SODIUM 1000 [USP'U]/ML
INJECTION, SOLUTION INTRAVENOUS; SUBCUTANEOUS AS NEEDED
Status: DISCONTINUED | OUTPATIENT
Start: 2024-08-20 | End: 2024-08-29 | Stop reason: HOSPADM

## 2024-08-20 RX ORDER — SODIUM CHLORIDE 9 MG/ML
INJECTION, SOLUTION INTRAVENOUS CONTINUOUS PRN
Status: DISCONTINUED | OUTPATIENT
Start: 2024-08-20 | End: 2024-08-20

## 2024-08-20 RX ORDER — SODIUM CHLORIDE, SODIUM LACTATE, POTASSIUM CHLORIDE, CALCIUM CHLORIDE 600; 310; 30; 20 MG/100ML; MG/100ML; MG/100ML; MG/100ML
40 INJECTION, SOLUTION INTRAVENOUS CONTINUOUS
Status: DISCONTINUED | OUTPATIENT
Start: 2024-08-20 | End: 2024-08-20

## 2024-08-20 RX ADMIN — LIDOCAINE HYDROCHLORIDE 10 ML: 10 INJECTION, SOLUTION EPIDURAL; INFILTRATION; INTRACAUDAL; PERINEURAL at 11:51

## 2024-08-20 RX ADMIN — HYDROMORPHONE HYDROCHLORIDE 0.4 MG: 1 INJECTION, SOLUTION INTRAMUSCULAR; INTRAVENOUS; SUBCUTANEOUS at 23:37

## 2024-08-20 RX ADMIN — DIPHENHYDRAMINE HYDROCHLORIDE 25 MG: 50 INJECTION, SOLUTION INTRAMUSCULAR; INTRAVENOUS at 14:19

## 2024-08-20 RX ADMIN — HYDROMORPHONE HYDROCHLORIDE 0.4 MG: 1 INJECTION, SOLUTION INTRAMUSCULAR; INTRAVENOUS; SUBCUTANEOUS at 00:33

## 2024-08-20 RX ADMIN — PIPERACILLIN SODIUM AND TAZOBACTAM SODIUM 2.25 G: 2; .25 INJECTION, SOLUTION INTRAVENOUS at 20:31

## 2024-08-20 RX ADMIN — DIPHENHYDRAMINE HYDROCHLORIDE 25 MG: 50 INJECTION, SOLUTION INTRAMUSCULAR; INTRAVENOUS at 20:30

## 2024-08-20 RX ADMIN — PIPERACILLIN SODIUM AND TAZOBACTAM SODIUM 2.25 G: 2; .25 INJECTION, SOLUTION INTRAVENOUS at 05:44

## 2024-08-20 RX ADMIN — DIPHENHYDRAMINE HYDROCHLORIDE 25 MG: 50 INJECTION, SOLUTION INTRAMUSCULAR; INTRAVENOUS at 00:06

## 2024-08-20 RX ADMIN — HYDROMORPHONE HYDROCHLORIDE 0.2 MG: 1 INJECTION, SOLUTION INTRAMUSCULAR; INTRAVENOUS; SUBCUTANEOUS at 09:56

## 2024-08-20 RX ADMIN — HYDROMORPHONE HYDROCHLORIDE 0.4 MG: 1 INJECTION, SOLUTION INTRAMUSCULAR; INTRAVENOUS; SUBCUTANEOUS at 05:43

## 2024-08-20 RX ADMIN — HYDROMORPHONE HYDROCHLORIDE 0.4 MG: 1 INJECTION, SOLUTION INTRAMUSCULAR; INTRAVENOUS; SUBCUTANEOUS at 16:41

## 2024-08-20 RX ADMIN — METOPROLOL TARTRATE 25 MG: 25 TABLET, FILM COATED ORAL at 08:21

## 2024-08-20 RX ADMIN — CALCITRIOL CAPSULES 0.25 MCG 0.5 MCG: 0.25 CAPSULE ORAL at 08:21

## 2024-08-20 RX ADMIN — HEPARIN SODIUM 2000 UNITS: 1000 INJECTION INTRAVENOUS; SUBCUTANEOUS at 12:17

## 2024-08-20 RX ADMIN — METOPROLOL TARTRATE 25 MG: 25 TABLET, FILM COATED ORAL at 20:30

## 2024-08-20 RX ADMIN — DIPHENHYDRAMINE HYDROCHLORIDE 25 MG: 50 INJECTION, SOLUTION INTRAMUSCULAR; INTRAVENOUS at 08:21

## 2024-08-20 RX ADMIN — HYDROMORPHONE HYDROCHLORIDE 0.4 MG: 1 INJECTION, SOLUTION INTRAMUSCULAR; INTRAVENOUS; SUBCUTANEOUS at 20:30

## 2024-08-20 RX ADMIN — PREGABALIN 50 MG: 25 CAPSULE ORAL at 08:21

## 2024-08-20 RX ADMIN — HYDROMORPHONE HYDROCHLORIDE 0.4 MG: 1 INJECTION, SOLUTION INTRAMUSCULAR; INTRAVENOUS; SUBCUTANEOUS at 13:33

## 2024-08-20 RX ADMIN — PIPERACILLIN SODIUM AND TAZOBACTAM SODIUM 2.25 G: 2; .25 INJECTION, SOLUTION INTRAVENOUS at 13:32

## 2024-08-20 RX ADMIN — Medication 21 PERCENT: at 13:55

## 2024-08-20 RX ADMIN — SODIUM ZIRCONIUM CYCLOSILICATE 10 G: 10 POWDER, FOR SUSPENSION ORAL at 16:40

## 2024-08-20 SDOH — HEALTH STABILITY: MENTAL HEALTH: CURRENT SMOKER: 0

## 2024-08-20 ASSESSMENT — PAIN - FUNCTIONAL ASSESSMENT
PAIN_FUNCTIONAL_ASSESSMENT: FLACC (FACE, LEGS, ACTIVITY, CRY, CONSOLABILITY)
PAIN_FUNCTIONAL_ASSESSMENT: FLACC (FACE, LEGS, ACTIVITY, CRY, CONSOLABILITY)
PAIN_FUNCTIONAL_ASSESSMENT: 0-10
PAIN_FUNCTIONAL_ASSESSMENT: FLACC (FACE, LEGS, ACTIVITY, CRY, CONSOLABILITY)
PAIN_FUNCTIONAL_ASSESSMENT: FLACC (FACE, LEGS, ACTIVITY, CRY, CONSOLABILITY)
PAIN_FUNCTIONAL_ASSESSMENT: 0-10
PAIN_FUNCTIONAL_ASSESSMENT: CPOT (CRITICAL CARE PAIN OBSERVATION TOOL)
PAIN_FUNCTIONAL_ASSESSMENT: 0-10
PAIN_FUNCTIONAL_ASSESSMENT: CPOT (CRITICAL CARE PAIN OBSERVATION TOOL)
PAIN_FUNCTIONAL_ASSESSMENT: 0-10

## 2024-08-20 ASSESSMENT — PAIN DESCRIPTION - DESCRIPTORS
DESCRIPTORS: BURNING

## 2024-08-20 ASSESSMENT — COGNITIVE AND FUNCTIONAL STATUS - GENERAL
DAILY ACTIVITIY SCORE: 24
MOBILITY SCORE: 22
CLIMB 3 TO 5 STEPS WITH RAILING: A LITTLE
MOBILITY SCORE: 24
WALKING IN HOSPITAL ROOM: A LITTLE
DAILY ACTIVITIY SCORE: 24

## 2024-08-20 ASSESSMENT — PAIN SCALES - GENERAL
PAINLEVEL_OUTOF10: 5 - MODERATE PAIN
PAINLEVEL_OUTOF10: 0 - NO PAIN
PAINLEVEL_OUTOF10: 0 - NO PAIN
PAINLEVEL_OUTOF10: 10 - WORST POSSIBLE PAIN
PAINLEVEL_OUTOF10: 10 - WORST POSSIBLE PAIN
PAINLEVEL_OUTOF10: 9
PAINLEVEL_OUTOF10: 0 - NO PAIN
PAINLEVEL_OUTOF10: 0 - NO PAIN
PAIN_LEVEL: 2
PAINLEVEL_OUTOF10: 6
PAINLEVEL_OUTOF10: 10 - WORST POSSIBLE PAIN
PAINLEVEL_OUTOF10: 10 - WORST POSSIBLE PAIN
PAINLEVEL_OUTOF10: 9
PAINLEVEL_OUTOF10: 10 - WORST POSSIBLE PAIN
PAINLEVEL_OUTOF10: 0 - NO PAIN

## 2024-08-20 ASSESSMENT — PAIN DESCRIPTION - LOCATION
LOCATION: CHEST

## 2024-08-20 ASSESSMENT — PAIN DESCRIPTION - ORIENTATION
ORIENTATION: LEFT
ORIENTATION: LEFT

## 2024-08-20 NOTE — PROGRESS NOTES
Seeing patient for left TDC tunnel site infection and complex end-stage renal disease patient on hemodialysis.  Her current access site is her only remaining IV access.  IR was unable to remove catheter yesterday.  There are no IR services at [facility] today.    36.4   36.2  Chest: Left IJ TDC with continued erythema, tenderness, purulent drainage    WBC: 5.5  Creatinine: 7.9    Blood culture (8/19 peripherally): X 2-NGTD  Blood culture (8/19 TDC): X 1-NGTD  Tunnel abscess site culture (8/19): Pending    Impression:  1.  TDC associated tunnel infection  -- Patient recently had her TDC changed over guidewire on 8/14/2024.  Shortly thereafter she began to develop increasing pain, erythema and more recently gross pus draining from the tunnel site.  -- This is the patient's last available access site for dialysis catheter.  -- IR unable to remove catheter yesterday.  Apparently there is no IR services available today at the hospital.    Plan:  1.  Continue Zosyn.  Monitor for adverse antibiotic events.  2.  Continue vancomycin with dialysis.  3.  Await pending blood and tunnel site culture results.  4.  Patient needs TDC removed ASAP.  Per discussion with Dr. Zhou Pedersen plan is for catheter removal today at 11 AM at [facility].    Esa Hi MD  [facility]  [phone]

## 2024-08-20 NOTE — PROGRESS NOTES
"Batsheva Page is a 48 y.o. female on day 2 of admission presenting with Abscess.    Subjective    patient was admitted to hospital with infected dialysis catheter with pus coming out the patient just came back after she had the catheter removed she is awake and responsive no fever       Objective     Physical Exam  Neck:      Vascular: No carotid bruit.   Cardiovascular:      Rate and Rhythm: Normal rate and regular rhythm.      Heart sounds: No murmur heard.     No friction rub. No gallop.   Pulmonary:      Breath sounds: No wheezing, rhonchi or rales.   Chest:      Chest wall: No tenderness.   Abdominal:      General: There is no distension.      Tenderness: There is no abdominal tenderness. There is no guarding or rebound.   Musculoskeletal:         General: No swelling or tenderness.      Cervical back: Neck supple.      Right lower leg: No edema.      Left lower leg: No edema.   Lymphadenopathy:      Cervical: No cervical adenopathy.         Last Recorded Vitals  Blood pressure 138/50, pulse 74, temperature 36.1 °C (97 °F), temperature source Temporal, resp. rate 18, height 1.676 m (5' 6\"), weight 65 kg (143 lb 4.8 oz), SpO2 100%.    Intake/Output last 3 Shifts:  I/O last 3 completed shifts:  In: 1270 (18.1 mL/kg) [P.O.:120; I.V.:600 (8.6 mL/kg); Other:400; IV Piggyback:150]  Out: 1220 (17.4 mL/kg) [Other:1220]  Weight: 70 kg     Current Facility-Administered Medications:     acetaminophen (Tylenol) tablet 650 mg, 650 mg, oral, q4h PRN **OR** acetaminophen (Tylenol) oral liquid 650 mg, 650 mg, nasogastric tube, q4h PRN **OR** acetaminophen (Tylenol) suppository 650 mg, 650 mg, rectal, q4h PRN, Everett Lopez,     albuterol 2.5 mg /3 mL (0.083 %) nebulizer solution 2.5 mg, 2.5 mg, nebulization, q30 min PRN, Lane Jean, DO    atorvastatin (Lipitor) tablet 40 mg, 40 mg, oral, Nightly, Everett Lopez DO    benzocaine-menthol (Cepastat Sore Throat) lozenge 1 lozenge, 1 lozenge, " Mouth/Throat, q2h PRN, Everett Lopez DO    calcitriol (Rocaltrol) capsule 0.5 mcg, 0.5 mcg, oral, Daily, Everett Lopez DO, 0.5 mcg at 08/20/24 0821    dextromethorphan-guaifenesin (Robitussin DM)  mg/5 mL oral liquid 5 mL, 5 mL, oral, q4h PRN, Everett Lopez DO    dextrose 5%-0.45 % sodium chloride infusion, 50 mL/hr, intravenous, Continuous, Anjana Vee, APRN-CNP    diphenhydrAMINE (BENADryl) injection 25 mg, 25 mg, intravenous, q6h PRN, Jamil Steinberg MD, 25 mg at 08/20/24 0821    epoetin brandy-epbx (RETACRIT) injection 6,500 Units, 6,500 Units, subcutaneous, Once per day on Monday Wednesday Friday, Everett Lopez DO, 6,500 Units at 08/19/24 0900    fentaNYL PF (Sublimaze) injection 50 mcg, 50 mcg, intravenous, q5 min PRN, Lane Jean DO    guaiFENesin (Mucinex) 12 hr tablet 600 mg, 600 mg, oral, q12h PRN, Everett Lopez DO    heparin (porcine) injection 5,000 Units, 5,000 Units, subcutaneous, q8h, Everett Lopez DO    heparin 1,000 unit/mL injection, , , PRN, Melvin Medina MD, 2,000 Units at 08/20/24 1217    HYDROmorphone (Dilaudid) injection 0.2 mg, 0.2 mg, intravenous, q3h PRN, Everett Lopez DO, 0.2 mg at 08/20/24 0956    HYDROmorphone (Dilaudid) injection 0.4 mg, 0.4 mg, intravenous, q3h PRN, Everett Lopez DO, 0.4 mg at 08/20/24 1333    HYDROmorphone PF (Dilaudid) injection 0.2 mg, 0.2 mg, intravenous, q5 min PRN, Lane Jean DO    ipratropium (Atrovent) 0.02 % nebulizer solution 500 mcg, 500 mcg, nebulization, q30 min PRN, Lane eJan DO    labetaloL (Normodyne,Trandate) injection 5 mg, 5 mg, intravenous, q5 min PRN, Lane Jean DO    lactated Ringer's infusion, 40 mL/hr, intravenous, Continuous, Lane Jean, , Stopped at 08/20/24 1330    levETIRAcetam (Keppra) tablet 750 mg, 750 mg, oral, Nightly, Everett Lopez,     lidocaine PF (Xylocaine) 10 mg/mL (1 %) injection, , , PRN, Melvin REZA  MD Adam, 10 mL at 08/20/24 1151    melatonin tablet 5 mg, 5 mg, oral, Nightly PRN, Everett Lopez DO    meperidine (PF) (Demerol) injection 12.5 mg, 12.5 mg, intravenous, q10 min PRN, Lane Jean DO    metoprolol tartrate (Lopressor) tablet 25 mg, 25 mg, oral, BID, Everett Lopez DO, 25 mg at 08/20/24 0821    mirtazapine (Remeron) tablet 15 mg, 15 mg, oral, Nightly, Everett Lopez DO    ondansetron ODT (Zofran-ODT) disintegrating tablet 4 mg, 4 mg, oral, q8h PRN **OR** ondansetron (Zofran) injection 4 mg, 4 mg, intravenous, q8h PRN, Everett Lopez DO    oxygen (O2) therapy, , inhalation, Continuous - 02/gases, Lane Jean DO    piperacillin-tazobactam (Zosyn) 2.25 g in dextrose (iso) IV 50 mL, 2.25 g, intravenous, q8h, Everett Lopez DO, Stopped at 08/20/24 1402    pregabalin (Lyrica) capsule 50 mg, 50 mg, oral, BID, Everett Lopez DO, 50 mg at 08/20/24 0821    vancomycin (Vancocin) 500 mg in dextrose 5% 100 mL IV, 500 mg, intravenous, Every Mon/Wed/Fri, Everett Lopez DO, Stopped at 08/19/24 1812    vancomycin (Vancocin) pharmacy to dose - pharmacy monitoring, , miscellaneous, Daily PRN, Everett Lopez DO   Relevant Results    Results for orders placed or performed during the hospital encounter of 08/18/24 (from the past 96 hour(s))   Magnesium   Result Value Ref Range    Magnesium 1.90 1.60 - 3.10 mg/dL   Blood Gas Lactic Acid, Venous   Result Value Ref Range    POCT Lactate, Venous 1.8 0.4 - 2.0 mmol/L   Blood Culture    Specimen: Peripheral Venipuncture; Blood culture   Result Value Ref Range    Blood Culture No growth at 1 day    Blood Culture    Specimen: Peripheral Venipuncture; Blood culture   Result Value Ref Range    Blood Culture No growth at 1 day    Sars-CoV-2 PCR   Result Value Ref Range    Coronavirus 2019, PCR Not Detected Not Detected   CBC and Auto Differential   Result Value Ref Range    WBC 7.6 4.4 - 11.3 x10*3/uL    nRBC 0.0 0.0 -  0.0 /100 WBCs    RBC 3.54 (L) 4.00 - 5.20 x10*6/uL    Hemoglobin 9.8 (L) 12.0 - 16.0 g/dL    Hematocrit 31.4 (L) 36.0 - 46.0 %    MCV 89 80 - 100 fL    MCH 27.7 26.0 - 34.0 pg    MCHC 31.2 (L) 32.0 - 36.0 g/dL    RDW 19.6 (H) 11.5 - 14.5 %    Platelets 165 150 - 450 x10*3/uL    Neutrophils % 78.2 40.0 - 80.0 %    Immature Granulocytes %, Automated 0.3 0.0 - 0.9 %    Lymphocytes % 10.3 13.0 - 44.0 %    Monocytes % 9.3 2.0 - 10.0 %    Eosinophils % 1.6 0.0 - 6.0 %    Basophils % 0.3 0.0 - 2.0 %    Neutrophils Absolute 5.91 1.20 - 7.70 x10*3/uL    Immature Granulocytes Absolute, Automated 0.02 0.00 - 0.70 x10*3/uL    Lymphocytes Absolute 0.78 (L) 1.20 - 4.80 x10*3/uL    Monocytes Absolute 0.70 0.10 - 1.00 x10*3/uL    Eosinophils Absolute 0.12 0.00 - 0.70 x10*3/uL    Basophils Absolute 0.02 0.00 - 0.10 x10*3/uL   Comprehensive metabolic panel   Result Value Ref Range    Glucose 104 (H) 65 - 99 mg/dL    Sodium 130 (L) 133 - 145 mmol/L    Potassium 3.7 3.4 - 5.1 mmol/L    Chloride 84 (L) 97 - 107 mmol/L    Bicarbonate 21 (L) 24 - 31 mmol/L    Urea Nitrogen 54 (H) 8 - 25 mg/dL    Creatinine 9.70 (H) 0.40 - 1.60 mg/dL    eGFR 5 (L) >60 mL/min/1.73m*2    Calcium 8.1 (L) 8.5 - 10.4 mg/dL    Albumin 3.5 3.5 - 5.0 g/dL    Alkaline Phosphatase 82 35 - 125 U/L    Total Protein 7.8 5.9 - 7.9 g/dL    AST 15 5 - 40 U/L    Bilirubin, Total 0.4 0.1 - 1.2 mg/dL    ALT 7 5 - 40 U/L    Anion Gap >19 (H) <=19 mmol/L   Renal Function Panel   Result Value Ref Range    Glucose 99 65 - 99 mg/dL    Sodium 132 (L) 133 - 145 mmol/L    Potassium 4.2 3.4 - 5.1 mmol/L    Chloride 87 (L) 97 - 107 mmol/L    Bicarbonate 19 (L) 24 - 31 mmol/L    Urea Nitrogen 58 (H) 8 - 25 mg/dL    Creatinine 10.40 (H) 0.40 - 1.60 mg/dL    eGFR 4 (L) >60 mL/min/1.73m*2    Calcium 8.0 (L) 8.5 - 10.4 mg/dL    Phosphorus 6.1 (H) 2.5 - 4.5 mg/dL    Albumin 3.3 (L) 3.5 - 5.0 g/dL    Anion Gap >19 (H) <=19 mmol/L   CBC and Auto Differential   Result Value Ref Range    WBC  6.6 4.4 - 11.3 x10*3/uL    nRBC 0.0 0.0 - 0.0 /100 WBCs    RBC 3.58 (L) 4.00 - 5.20 x10*6/uL    Hemoglobin 9.8 (L) 12.0 - 16.0 g/dL    Hematocrit 32.3 (L) 36.0 - 46.0 %    MCV 90 80 - 100 fL    MCH 27.4 26.0 - 34.0 pg    MCHC 30.3 (L) 32.0 - 36.0 g/dL    RDW 19.9 (H) 11.5 - 14.5 %    Platelets 165 150 - 450 x10*3/uL    Neutrophils % 65.0 40.0 - 80.0 %    Immature Granulocytes %, Automated 0.2 0.0 - 0.9 %    Lymphocytes % 18.6 13.0 - 44.0 %    Monocytes % 11.3 2.0 - 10.0 %    Eosinophils % 4.4 0.0 - 6.0 %    Basophils % 0.5 0.0 - 2.0 %    Neutrophils Absolute 4.32 1.20 - 7.70 x10*3/uL    Immature Granulocytes Absolute, Automated 0.01 0.00 - 0.70 x10*3/uL    Lymphocytes Absolute 1.23 1.20 - 4.80 x10*3/uL    Monocytes Absolute 0.75 0.10 - 1.00 x10*3/uL    Eosinophils Absolute 0.29 0.00 - 0.70 x10*3/uL    Basophils Absolute 0.03 0.00 - 0.10 x10*3/uL   Tissue/Wound Culture/Smear    Specimen: Other (specify in comments); Tissue/Biopsy   Result Value Ref Range    Tissue/Wound Culture/Smear Culture in progress    Blood Culture    Specimen: Dialysis; Blood culture   Result Value Ref Range    Blood Culture Loaded on Instrument - Culture in progress    Renal Function Panel   Result Value Ref Range    Glucose 91 65 - 99 mg/dL    Sodium 136 133 - 145 mmol/L    Potassium 5.0 3.4 - 5.1 mmol/L    Chloride 93 (L) 97 - 107 mmol/L    Bicarbonate 22 (L) 24 - 31 mmol/L    Urea Nitrogen 40 (H) 8 - 25 mg/dL    Creatinine 7.90 (H) 0.40 - 1.60 mg/dL    eGFR 6 (L) >60 mL/min/1.73m*2    Calcium 8.5 8.5 - 10.4 mg/dL    Phosphorus 5.3 (H) 2.5 - 4.5 mg/dL    Albumin 3.3 (L) 3.5 - 5.0 g/dL    Anion Gap >19 (H) <=19 mmol/L   CBC and Auto Differential   Result Value Ref Range    WBC 5.5 4.4 - 11.3 x10*3/uL    nRBC 0.0 0.0 - 0.0 /100 WBCs    RBC 3.91 (L) 4.00 - 5.20 x10*6/uL    Hemoglobin 10.9 (L) 12.0 - 16.0 g/dL    Hematocrit 34.8 (L) 36.0 - 46.0 %    MCV 89 80 - 100 fL    MCH 27.9 26.0 - 34.0 pg    MCHC 31.3 (L) 32.0 - 36.0 g/dL    RDW 19.9 (H)  11.5 - 14.5 %    Platelets 159 150 - 450 x10*3/uL    Neutrophils % 64.5 40.0 - 80.0 %    Immature Granulocytes %, Automated 0.4 0.0 - 0.9 %    Lymphocytes % 17.1 13.0 - 44.0 %    Monocytes % 11.2 2.0 - 10.0 %    Eosinophils % 6.1 0.0 - 6.0 %    Basophils % 0.7 0.0 - 2.0 %    Neutrophils Absolute 3.52 1.20 - 7.70 x10*3/uL    Immature Granulocytes Absolute, Automated 0.02 0.00 - 0.70 x10*3/uL    Lymphocytes Absolute 0.93 (L) 1.20 - 4.80 x10*3/uL    Monocytes Absolute 0.61 0.10 - 1.00 x10*3/uL    Eosinophils Absolute 0.33 0.00 - 0.70 x10*3/uL    Basophils Absolute 0.04 0.00 - 0.10 x10*3/uL       Assessment/Plan   End-stage kidney disease  dialysis catheter is removed because of infection for few days we have to monitor her potassium very carefully  Infected dialysis catheter with abscess formation catheter was discontinued by IR continue antibiotic therapy per infectious disease  Status post aortic valve placement  History of endocarditis  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

## 2024-08-20 NOTE — ANESTHESIA POSTPROCEDURE EVALUATION
Patient: Batsheva Page    Procedure Summary       Date: 08/20/24 Room / Location: [facility]    Anesthesia Start: 1120 Anesthesia Stop: 1247    Procedure: IR CVC REMOVAL Diagnosis: (Infected tunneled dialysis catheter with abscess needs to remove catheter as soon as possible and place a guidewire)    Scheduled Providers: Melvin Medina MD Responsible Provider: Lane Jean DO    Anesthesia Type: MAC ASA Status: 4 - Emergent            Anesthesia Type: MAC    Vitals Value Taken Time   /61 08/20/24 1244   Temp 36.1 °C (97 °F) 08/20/24 1244   Pulse 81 08/20/24 1244   Resp 13 08/20/24 1244   SpO2 100 % 08/20/24 1244       Anesthesia Post Evaluation    Patient location during evaluation: PACU  Patient participation: complete - patient participated  Level of consciousness: awake and alert  Pain score: 2  Pain management: adequate  Airway patency: patent  Cardiovascular status: acceptable  Respiratory status: acceptable  Hydration status: acceptable  Postoperative Nausea and Vomiting: none    No notable events documented.

## 2024-08-20 NOTE — PRE-PROCEDURE NOTE
Interventional Radiology Preprocedure Note    Indication for procedure: The encounter diagnosis was Abscess.    Relevant review of systems: NA    Relevant Labs:   Lab Results   Component Value Date    CREATININE 7.90 (H) 08/20/2024    EGFR 6 (L) 08/20/2024    INR 1.4 (H) 04/28/2024    PROTIME 14.0 (H) 04/28/2024       Planned Sedation/Anesthesia: Deep    Airway assessment: normal    Directed physical examination:    GENERAL: awake, no acute distress  HEENT: AT/NC  NECK: swelling and redness overlying the internal jugular venotomy site  CV: RRR  PULM: non-labored breathing  ABDOMEN: soft, ND  NEURO: AAOx3  MSK: full ROM  SKIN: swelling redness and pus at the skin entry site of the catheter    Benefits, risks and alternatives of procedure and planned sedation have been discussed with the patient and/or their representative. All questions answered and they agree to proceed.

## 2024-08-20 NOTE — PROGRESS NOTES
"Batsheva Page is a 48 y.o. female on day 2 of admission presenting drainage from dialysis catheter site on the left side of the chest.     Subjective   She reports pain at the dialysis catheter site. She was last dialyzed last Friday 8/16.  He has had previous dialysis catheter infections in the past and also has a history of mitral and aortic valve endocarditis requiring mitral and aortic valve replacements.  She had had a catheter exchanged over a guidewire on 8/14.  There has been difficulty obtaining IV access for dialysis.  She was to have her dialysis catheter removed yesterday, this could not be accomplished.  She has pain at the dialysis catheter insertion site, relieved by pain medication    Objective     Physical Exam  General: awake, alert, oriented, responsive  Cardiovascular: regular, normal S1 and S2  Chest: there was tenderness and redness at the left chest dialysis catheter insertion site.   Lungs: good air entry bilaterally, clear to auscultation  Abdomen: soft, nontender, bowel sounds present, normoactive  Extremities: no peripheral cyanosis, no pedal edema  Neuro: alert, oriented x 3, no focal weakness      Last Recorded Vitals  Blood pressure 122/68, pulse 75, temperature 36.6 °C (97.9 °F), temperature source Oral, resp. rate 18, height 1.727 m (5' 8\"), weight 70 kg (154 lb 5.2 oz), SpO2 98%.  Intake/Output last 3 Shifts:  I/O last 3 completed shifts:  In: 1270 (18.1 mL/kg) [P.O.:120; I.V.:600 (8.6 mL/kg); Other:400; IV Piggyback:150]  Out: 1220 (17.4 mL/kg) [Other:1220]  Weight: 70 kg     Relevant Results  Lab Results   Component Value Date    WBC 5.5 08/20/2024    HGB 10.9 (L) 08/20/2024    HCT 34.8 (L) 08/20/2024    MCV 89 08/20/2024     08/20/2024     Lab Results   Component Value Date    GLUCOSE 91 08/20/2024    CALCIUM 8.5 08/20/2024     08/20/2024    K 5.0 08/20/2024    CO2 22 (L) 08/20/2024    CL 93 (L) 08/20/2024    BUN 40 (H) 08/20/2024    CREATININE 7.90 (H) " 08/20/2024     Scheduled medications  atorvastatin, 40 mg, oral, Nightly  calcitriol, 0.5 mcg, oral, Daily  epoetin brandy-epbx, 6,500 Units, subcutaneous, Once per day on Monday Wednesday Friday  heparin (porcine), 5,000 Units, subcutaneous, q8h  levETIRAcetam, 750 mg, oral, Nightly  metoprolol tartrate, 25 mg, oral, BID  mirtazapine, 15 mg, oral, Nightly  piperacillin-tazobactam, 2.25 g, intravenous, q8h  pregabalin, 50 mg, oral, BID  vancomycin (Vancocin) 500 mg in dextrose 5% 100 mL IV, 500 mg, intravenous, Every Mon/Wed/Fri      Continuous medications     PRN medications  PRN medications: acetaminophen **OR** acetaminophen **OR** acetaminophen, benzocaine-menthol, dextromethorphan-guaifenesin, diphenhydrAMINE, guaiFENesin, HYDROmorphone, HYDROmorphone, melatonin, ondansetron ODT **OR** ondansetron, vancomycin      Assessment/Plan   Tunneled dialysis catheter site infection, 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   Recurrent event.  She was admitted in 5/2024 with a dialysis catheter associated infection.  She has very difficult IV access.  The dialysis catheter was removed at that time over a guidewire which was left in place until a new catheter was placed.  Blood cultures from 8/18 are in progress  Blood culture from 8/19 is in progress  She is on IV Zosyn and vancomycin.  ID following.    End-stage renal disease  On hemodialysis per nephrology.    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    Seizure disorder  On Keppra.    Plan  Awaiting removal of dialysis catheter  Antibiotics per ID    Jamil Steinberg MD

## 2024-08-20 NOTE — ANESTHESIA PREPROCEDURE EVALUATION
"Patient: Batsheva Page    Procedure Information       Date/Time: 05/07/24 1015    Scheduled providers: Chris Lott MD    Procedure: IR CVC EXCHANGE    Location: [facility]          Past Medical History:   Diagnosis Date    Aortic valve replaced 2022    CHF (congestive heart failure) (Multi)     Chronic pain     Coronary artery disease     Disease of thyroid gland     ESRD (end stage renal disease) (Multi)     H/O mitral valve replacement 2013    and 2022    Heart disease     History of transfusion     Hypertension     Mitral valve regurgitation     Seizures (Multi)     Stroke (Multi)      Past Surgical History:   Procedure Laterality Date    AORTIC VALVE REPLACEMENT  2020    bioprosthetic    CORONARY ARTERY BYPASS GRAFT      IR CVC TUNNELED  09/09/2022    IR CVC TUNNELED 9/9/2022 [facility] [place] [facility]    IR CVC TUNNELED  12/28/2022    IR CVC TUNNELED 12/28/2022 [facility] [place] [facility]    MITRAL VALVE REPLACEMENT  2013    bioprosthetic    MITRAL VALVE REPLACEMENT  2020    bioprosthetic    PARATHYROIDECTOMY      US GUIDED PERCUTANEOUS PLACEMENT  07/14/2022    US GUIDED PERCUTANEOUS PLACEMENT [facility]     Allergies   Allergen Reactions    Kayexalate Seizure    Metoclopramide Hcl Other     anxious, agitated, \"skin crawl    Prochlorperazine Other     anxious, agitated, \"skin crawl    Zofran [Ondansetron Hcl] Headache    Ondansetron Other and Headache       Relevant Problems   Anesthesia (within normal limits)      Cardiac   (+) Arteriosclerosis of coronary artery bypass graft   (+) Benign essential hypertension   (+) Cardiac pacemaker in situ   (+) Coronary artery disease   (+) HTN (hypertension)   (+) Paroxysmal atrial fibrillation (Multi)      Neuro   (+) Anxiety   (+) Depression with anxiety   (+) Major depressive disorder, recurrent, severe with psychotic symptoms (Multi)   (+) Seizure (Multi)      /Renal   (+) ESRD (end stage renal disease) (Multi)   (+) End-stage renal disease " on hemodialysis (Multi)   (+) Hyponatremia      Hematology   (+) Anemia of chronic disease   (+) Anemia secondary to renal failure   (+) Iron deficiency anemia      ID   (+) MRSA (methicillin resistant Staphylococcus aureus) infection   (+) MRSA bacteremia       Clinical information reviewed:   Tobacco  Allergies  Meds   Med Hx  Surg Hx   Fam Hx  Soc Hx        NPO Detail:  NPO/Void Status  Date of Last Liquid: 08/19/24  Date of Last Solid: 08/19/24         Physical Exam    Airway  Mallampati: II  TM distance: >3 FB  Neck ROM: full     Cardiovascular   Comments: deferred   Dental    Pulmonary   Comments: deferred   Abdominal            Anesthesia Plan    History of general anesthesia?: yes  History of complications of general anesthesia?: no    ASA 4     MAC   (Had discussion with patient regarding perioperative code status.  She said she was ok with intubation and resuscitation if needed perioperatively.)  The patient is not a current smoker.  Patient was not previously instructed to abstain from smoking on day of procedure.  Patient did not smoke on day of procedure.  Education provided regarding risk of obstructive sleep apnea.  intravenous induction   Postoperative administration of opioids is intended.  Anesthetic plan and risks discussed with patient.  Use of blood products discussed with patient who consented to blood products.    Plan discussed with CRNA and CAA.

## 2024-08-20 NOTE — POST-PROCEDURE NOTE
Interventional Radiology Brief Postprocedure Note    Attending: Manuel Medina    Diagnosis: Superficial infection involving the left internal jugular tunneled catheter    Description of procedure: Placement of nontunneled right EJ dialysis catheter, Removal of left internal jugular TDC     Anesthesia:  MAC    Complications: None    Estimated Blood Loss: minimal    Medications  As of 08/20/24 1232      oxyCODONE-acetaminophen (Percocet) 5-325 mg per tablet 2 tablet (tablet) Total dose:  2 tablet Dosing weight:  65      Date/Time Rate/Dose/Volume Action       08/18/24 2004 2 tablet Given               piperacillin-tazobactam (Zosyn) 4.5 g in dextrose (iso)  mL (g) Total dose:  4.5 g* Dosing weight:  65   *From user-documented volume     Date/Time Rate/Dose/Volume Action       08/18/24 2004 4.5 g (over 30 min) New Bag      2034 100 mL Stopped               vancomycin (Xellia) 2 g in diluent combination  mL (mL/hr) Total volume:  Not documented* Dosing weight:  65   *Total volume has not been documented. View each administration to see the amount administered.     Date/Time Rate/Dose/Volume Action       08/18/24 2056 2 g - 200 mL/hr (over 120 min) New Bag      2256  (over 120 min) Stopped               HYDROmorphone (Dilaudid) injection 1 mg (mg) Total dose:  1 mg Dosing weight:  65      Date/Time Rate/Dose/Volume Action       08/18/24 2053 1 mg Given               atorvastatin (Lipitor) tablet 40 mg (mg) Total dose:  0 mg*   *Administration not included in total     Date/Time Rate/Dose/Volume Action       08/18/24  2315 *40 mg Missed     08/19/24  2100 *40 mg Missed               calcitriol (Rocaltrol) capsule 0.5 mcg (mcg) Total dose:  1 mcg      Date/Time Rate/Dose/Volume Action       08/19/24  0846 0.5 mcg Given     08/20/24  0821 0.5 mcg Given               epoetin brandy-epbx (RETACRIT) injection 6,500 Units (Units) Total dose:  6,500 Units Dosing weight:  70      Date/Time Rate/Dose/Volume Action        08/19/24  0900 6,500 Units Given               levETIRAcetam (Keppra) tablet 750 mg (mg) Total dose:  0 mg*   *Administration not included in total     Date/Time Rate/Dose/Volume Action       08/18/24  2330 *750 mg Missed     08/19/24  2100 *750 mg Missed               metoprolol tartrate (Lopressor) tablet 25 mg (mg) Total dose:  25 mg* Dosing weight:  65   *Administration not included in total     Date/Time Rate/Dose/Volume Action       08/18/24  2330 *25 mg Missed     08/19/24  0846 *25 mg Missed      2100 *25 mg Missed     08/20/24  0821 25 mg Given               mirtazapine (Remeron) tablet 15 mg (mg) Total dose:  0 mg* Dosing weight:  65   *Administration not included in total     Date/Time Rate/Dose/Volume Action       08/18/24  2330 *15 mg Missed     08/19/24  2100 *15 mg Missed               pregabalin (Lyrica) capsule 50 mg (mg) Total dose:  100 mg*   *Administration not included in total     Date/Time Rate/Dose/Volume Action       08/18/24  2330 *50 mg Missed     08/19/24  0846 50 mg Given      2100 *50 mg Missed     08/20/24  0821 50 mg Given               heparin (porcine) injection 5,000 Units (Units) Total dose:  0 Units* Dosing weight:  65   *Administration not included in total     Date/Time Rate/Dose/Volume Action       08/19/24  0600 *5,000 Units Missed      1600 *5,000 Units Missed      2200 *5,000 Units Missed     08/20/24  0600 *5,000 Units Missed               piperacillin-tazobactam (Zosyn) 2.25 g in dextrose (iso) IV 50 mL (g) Total dose:  9 g* Dosing weight:  65   *From user-documented volume     Date/Time Rate/Dose/Volume Action       08/19/24  0539 2.25 g (over 30 min) New Bag      0609 50 mL Stopped      1126 2.25 g (over 30 min) New Bag      1156  (over 30 min) Stopped      2057 2.25 g (over 30 min) New Bag      2127  (over 30 min) Stopped     08/20/24  0544 2.25 g (over 30 min) New Bag      0614  (over 30 min) Stopped      1200 150 mL                HYDROmorphone (Dilaudid)  injection 0.2 mg (mg) Total dose:  0.2 mg Dosing weight:  65      Date/Time Rate/Dose/Volume Action       08/20/24  0956 0.2 mg Given               HYDROmorphone (Dilaudid) injection 0.4 mg (mg) Total dose:  3.2 mg Dosing weight:  65      Date/Time Rate/Dose/Volume Action       08/18/24  2322 0.4 mg Given     08/19/24  0330 0.4 mg Given      0722 0.4 mg Given      1126 0.4 mg Given      1601 0.4 mg Given      2053 0.4 mg Given     08/20/24  0033 0.4 mg Given      0543 0.4 mg Given               vancomycin (Vancocin) 500 mg in dextrose 5% 100 mL IV (mL/hr) Total dose:  500 mg* Dosing weight:  65   *From user-documented volume     Date/Time Rate/Dose/Volume Action       08/19/24  0609 0 mL [vol]       1742 500 mg - 200 mL/hr (over 30 min) New Bag      1812  (over 30 min) Stopped     08/20/24  1200 100 mL [vol]                diphenhydrAMINE (BENADryl) injection 50 mg (mg) Total dose:  50 mg Dosing weight:  70      Date/Time Rate/Dose/Volume Action       08/19/24  0135 50 mg Given               diphenhydrAMINE (BENADryl) injection 25 mg (mg) Total dose:  100 mg Dosing weight:  70      Date/Time Rate/Dose/Volume Action       08/19/24  1126 25 mg Given      1742 25 mg Given     08/20/24  0006 25 mg Given      0821 25 mg Given               dextrose 5%-0.45 % sodium chloride infusion (mL/hr) Total volume:  0 mL* Dosing weight:  65   *From user-documented volume     Date/Time Rate/Dose/Volume Action       08/20/24  1200 0 mL                lidocaine PF (Xylocaine) 10 mg/mL (1 %) injection (mL) Total volume:  10 mL      Date/Time Rate/Dose/Volume Action       08/20/24  1151 10 mL Given               heparin 1,000 unit/mL injection (Units) Total dose:  2,000 Units      Date/Time Rate/Dose/Volume Action       08/20/24  1217 2,000 Units Given                   Pus from left internal jugular venotomy site, Catheter tip sent for culture      See detailed result report with images in PACS.    The patient tolerated the procedure  well without incident or complication and is in stable condition.

## 2024-08-20 NOTE — PROGRESS NOTES
08/20/24 1226   Discharge Planning   Who is requesting discharge planning? Provider   Home or Post Acute Services None   Expected Discharge Disposition Home     No identified dc needs at this time. [facility] will continue to follow ongoing medical workup.    Safe dc plan secured home at this time.

## 2024-08-20 NOTE — CARE PLAN
Problem: Pain - Adult  Goal: Verbalizes/displays adequate comfort level or baseline comfort level  Outcome: Progressing     Problem: Safety - Adult  Goal: Free from fall injury  Outcome: Progressing     Problem: Discharge Planning  Goal: Discharge to home or other facility with appropriate resources  Outcome: Progressing     Problem: Chronic Conditions and Co-morbidities  Goal: Patient's chronic conditions and co-morbidity symptoms are monitored and maintained or improved  Outcome: Progressing     Problem: Fall/Injury  Goal: Not fall by end of shift  Outcome: Progressing  Goal: Be free from injury by end of the shift  Outcome: Progressing  Goal: Verbalize understanding of personal risk factors for fall in the hospital  Outcome: Progressing  Goal: Verbalize understanding of risk factor reduction measures to prevent injury from fall in the home  Outcome: Progressing  Goal: Use assistive devices by end of the shift  Outcome: Progressing  Goal: Pace activities to prevent fatigue by end of the shift  Outcome: Progressing   The patient's goals for the shift include      The clinical goals for the shift include pain management

## 2024-08-21 ENCOUNTER — APPOINTMENT (OUTPATIENT)
Dept: CARDIOLOGY | Facility: HOSPITAL | Age: 49
DRG: 314 | End: 2024-08-21
Payer: MEDICARE

## 2024-08-21 ENCOUNTER — APPOINTMENT (OUTPATIENT)
Dept: DIALYSIS | Facility: HOSPITAL | Age: 49
End: 2024-08-21
Payer: MEDICARE

## 2024-08-21 LAB
ALBUMIN SERPL-MCNC: 3.3 G/DL (ref 3.5–5)
ANION GAP SERPL CALC-SCNC: >19 MMOL/L
BASOPHILS # BLD AUTO: 0.02 X10*3/UL (ref 0–0.1)
BASOPHILS NFR BLD AUTO: 0.4 %
BUN SERPL-MCNC: 48 MG/DL (ref 8–25)
CALCIUM SERPL-MCNC: 7.8 MG/DL (ref 8.5–10.4)
CHLORIDE SERPL-SCNC: 95 MMOL/L (ref 97–107)
CO2 SERPL-SCNC: 22 MMOL/L (ref 24–31)
CREAT SERPL-MCNC: 9.3 MG/DL (ref 0.4–1.6)
EGFRCR SERPLBLD CKD-EPI 2021: 5 ML/MIN/1.73M*2
EOSINOPHIL # BLD AUTO: 0.36 X10*3/UL (ref 0–0.7)
EOSINOPHIL NFR BLD AUTO: 7.8 %
ERYTHROCYTE [DISTWIDTH] IN BLOOD BY AUTOMATED COUNT: 19.8 % (ref 11.5–14.5)
GLUCOSE SERPL-MCNC: 132 MG/DL (ref 65–99)
HCT VFR BLD AUTO: 32.2 % (ref 36–46)
HGB BLD-MCNC: 9.6 G/DL (ref 12–16)
IMM GRANULOCYTES # BLD AUTO: 0.02 X10*3/UL (ref 0–0.7)
IMM GRANULOCYTES NFR BLD AUTO: 0.4 % (ref 0–0.9)
LYMPHOCYTES # BLD AUTO: 0.93 X10*3/UL (ref 1.2–4.8)
LYMPHOCYTES NFR BLD AUTO: 20 %
MCH RBC QN AUTO: 27.5 PG (ref 26–34)
MCHC RBC AUTO-ENTMCNC: 29.8 G/DL (ref 32–36)
MCV RBC AUTO: 92 FL (ref 80–100)
MONOCYTES # BLD AUTO: 0.35 X10*3/UL (ref 0.1–1)
MONOCYTES NFR BLD AUTO: 7.5 %
NEUTROPHILS # BLD AUTO: 2.96 X10*3/UL (ref 1.2–7.7)
NEUTROPHILS NFR BLD AUTO: 63.9 %
NRBC BLD-RTO: 0 /100 WBCS (ref 0–0)
PHOSPHATE SERPL-MCNC: 6.2 MG/DL (ref 2.5–4.5)
PLATELET # BLD AUTO: 165 X10*3/UL (ref 150–450)
POTASSIUM SERPL-SCNC: 4.1 MMOL/L (ref 3.4–5.1)
RBC # BLD AUTO: 3.49 X10*6/UL (ref 4–5.2)
SODIUM SERPL-SCNC: 137 MMOL/L (ref 133–145)
VANCOMYCIN SERPL-MCNC: 47.5 UG/ML (ref 10–20)
WBC # BLD AUTO: 4.6 X10*3/UL (ref 4.4–11.3)

## 2024-08-21 PROCEDURE — 85025 COMPLETE CBC W/AUTO DIFF WBC: CPT | Performed by: INTERNAL MEDICINE

## 2024-08-21 PROCEDURE — 36415 COLL VENOUS BLD VENIPUNCTURE: CPT | Performed by: INTERNAL MEDICINE

## 2024-08-21 PROCEDURE — 93005 ELECTROCARDIOGRAM TRACING: CPT

## 2024-08-21 PROCEDURE — 99232 SBSQ HOSP IP/OBS MODERATE 35: CPT | Performed by: INTERNAL MEDICINE

## 2024-08-21 PROCEDURE — 6350000001 HC RX 635 EPOETIN >10,000 UNITS: Performed by: INTERNAL MEDICINE

## 2024-08-21 PROCEDURE — 2500000004 HC RX 250 GENERAL PHARMACY W/ HCPCS (ALT 636 FOR OP/ED): Performed by: INTERNAL MEDICINE

## 2024-08-21 PROCEDURE — 8010000001 HC DIALYSIS - HEMODIALYSIS PER DAY

## 2024-08-21 PROCEDURE — 2500000001 HC RX 250 WO HCPCS SELF ADMINISTERED DRUGS (ALT 637 FOR MEDICARE OP): Performed by: INTERNAL MEDICINE

## 2024-08-21 PROCEDURE — 2500000002 HC RX 250 W HCPCS SELF ADMINISTERED DRUGS (ALT 637 FOR MEDICARE OP, ALT 636 FOR OP/ED): Performed by: INTERNAL MEDICINE

## 2024-08-21 PROCEDURE — 1200000002 HC GENERAL ROOM WITH TELEMETRY DAILY

## 2024-08-21 PROCEDURE — 80069 RENAL FUNCTION PANEL: CPT | Performed by: INTERNAL MEDICINE

## 2024-08-21 PROCEDURE — 80202 ASSAY OF VANCOMYCIN: CPT | Performed by: INTERNAL MEDICINE

## 2024-08-21 RX ORDER — DIPHENHYDRAMINE HYDROCHLORIDE 50 MG/ML
50 INJECTION INTRAMUSCULAR; INTRAVENOUS ONCE
Status: COMPLETED | OUTPATIENT
Start: 2024-08-21 | End: 2024-08-21

## 2024-08-21 RX ORDER — HEPARIN SODIUM 1000 [USP'U]/ML
1000 INJECTION, SOLUTION INTRAVENOUS; SUBCUTANEOUS
Status: DISCONTINUED | OUTPATIENT
Start: 2024-08-21 | End: 2024-08-28 | Stop reason: SDUPTHER

## 2024-08-21 RX ADMIN — HYDROMORPHONE HYDROCHLORIDE 0.4 MG: 1 INJECTION, SOLUTION INTRAMUSCULAR; INTRAVENOUS; SUBCUTANEOUS at 20:37

## 2024-08-21 RX ADMIN — HYDROMORPHONE HYDROCHLORIDE 0.4 MG: 1 INJECTION, SOLUTION INTRAMUSCULAR; INTRAVENOUS; SUBCUTANEOUS at 23:34

## 2024-08-21 RX ADMIN — DIPHENHYDRAMINE HYDROCHLORIDE 25 MG: 50 INJECTION, SOLUTION INTRAMUSCULAR; INTRAVENOUS at 21:57

## 2024-08-21 RX ADMIN — EPOETIN ALFA-EPBX 6500 UNITS: 20000 INJECTION, SOLUTION INTRAVENOUS; SUBCUTANEOUS at 10:02

## 2024-08-21 RX ADMIN — DIPHENHYDRAMINE HYDROCHLORIDE 50 MG: 50 INJECTION, SOLUTION INTRAMUSCULAR; INTRAVENOUS at 15:55

## 2024-08-21 RX ADMIN — METOPROLOL TARTRATE 25 MG: 25 TABLET, FILM COATED ORAL at 09:25

## 2024-08-21 RX ADMIN — PIPERACILLIN SODIUM AND TAZOBACTAM SODIUM 2.25 G: 2; .25 INJECTION, SOLUTION INTRAVENOUS at 12:57

## 2024-08-21 RX ADMIN — HEPARIN SODIUM 1200 UNITS: 1000 INJECTION, SOLUTION INTRAVENOUS; SUBCUTANEOUS at 19:22

## 2024-08-21 RX ADMIN — SODIUM ZIRCONIUM CYCLOSILICATE 10 G: 10 POWDER, FOR SUSPENSION ORAL at 09:25

## 2024-08-21 RX ADMIN — HYDROMORPHONE HYDROCHLORIDE 0.4 MG: 1 INJECTION, SOLUTION INTRAMUSCULAR; INTRAVENOUS; SUBCUTANEOUS at 15:55

## 2024-08-21 RX ADMIN — HYDROMORPHONE HYDROCHLORIDE 0.4 MG: 1 INJECTION, SOLUTION INTRAMUSCULAR; INTRAVENOUS; SUBCUTANEOUS at 02:30

## 2024-08-21 RX ADMIN — HEPARIN SODIUM 1200 UNITS: 1000 INJECTION, SOLUTION INTRAVENOUS; SUBCUTANEOUS at 19:23

## 2024-08-21 RX ADMIN — HYDROMORPHONE HYDROCHLORIDE 0.4 MG: 1 INJECTION, SOLUTION INTRAMUSCULAR; INTRAVENOUS; SUBCUTANEOUS at 05:41

## 2024-08-21 RX ADMIN — PIPERACILLIN SODIUM AND TAZOBACTAM SODIUM 2.25 G: 2; .25 INJECTION, SOLUTION INTRAVENOUS at 20:35

## 2024-08-21 RX ADMIN — HYDROMORPHONE HYDROCHLORIDE 0.4 MG: 1 INJECTION, SOLUTION INTRAMUSCULAR; INTRAVENOUS; SUBCUTANEOUS at 08:59

## 2024-08-21 RX ADMIN — HYDROMORPHONE HYDROCHLORIDE 0.4 MG: 1 INJECTION, SOLUTION INTRAMUSCULAR; INTRAVENOUS; SUBCUTANEOUS at 12:56

## 2024-08-21 RX ADMIN — CALCITRIOL CAPSULES 0.25 MCG 0.5 MCG: 0.25 CAPSULE ORAL at 09:24

## 2024-08-21 RX ADMIN — PIPERACILLIN SODIUM AND TAZOBACTAM SODIUM 2.25 G: 2; .25 INJECTION, SOLUTION INTRAVENOUS at 04:49

## 2024-08-21 RX ADMIN — DIPHENHYDRAMINE HYDROCHLORIDE 25 MG: 50 INJECTION, SOLUTION INTRAMUSCULAR; INTRAVENOUS at 08:59

## 2024-08-21 RX ADMIN — ATORVASTATIN CALCIUM 40 MG: 40 TABLET, FILM COATED ORAL at 20:37

## 2024-08-21 RX ADMIN — METOPROLOL TARTRATE 25 MG: 25 TABLET, FILM COATED ORAL at 20:37

## 2024-08-21 ASSESSMENT — PAIN SCALES - GENERAL
PAINLEVEL_OUTOF10: 7
PAINLEVEL_OUTOF10: 10 - WORST POSSIBLE PAIN
PAINLEVEL_OUTOF10: 7
PAINLEVEL_OUTOF10: 6
PAINLEVEL_OUTOF10: 6
PAINLEVEL_OUTOF10: 10 - WORST POSSIBLE PAIN
PAINLEVEL_OUTOF10: 7
PAINLEVEL_OUTOF10: 7
PAINLEVEL_OUTOF10: 10 - WORST POSSIBLE PAIN
PAINLEVEL_OUTOF10: 7

## 2024-08-21 ASSESSMENT — COGNITIVE AND FUNCTIONAL STATUS - GENERAL
MOBILITY SCORE: 24
DAILY ACTIVITIY SCORE: 24
MOBILITY SCORE: 24
DAILY ACTIVITIY SCORE: 24

## 2024-08-21 ASSESSMENT — PAIN DESCRIPTION - DESCRIPTORS
DESCRIPTORS: BURNING;ACHING
DESCRIPTORS: ACHING;BURNING
DESCRIPTORS: ACHING
DESCRIPTORS: BURNING;ACHING
DESCRIPTORS: ACHING;SORE
DESCRIPTORS: ACHING;BURNING
DESCRIPTORS: BURNING
DESCRIPTORS: ACHING;BURNING

## 2024-08-21 ASSESSMENT — PAIN DESCRIPTION - ORIENTATION
ORIENTATION: LEFT
ORIENTATION: RIGHT;LEFT
ORIENTATION: LEFT
ORIENTATION: RIGHT;LEFT

## 2024-08-21 ASSESSMENT — PAIN DESCRIPTION - LOCATION
LOCATION: CHEST
LOCATION: CHEST
LOCATION: GENERALIZED

## 2024-08-21 NOTE — PROGRESS NOTES
Spiritual Care Visit    Clinical Encounter Type  Visited With: Patient  Routine Visit: Introduction  Continue Visiting: No         Values/Beliefs  Spiritual Requests During Hospitalization: No sacraments wanted by the patient or vsits    Sacramental Encounters  Communion: Does not want communion  Communion Given Indicator: No  Sacrament of Sick-Anointing: Patient declined anointing     David Peguero

## 2024-08-21 NOTE — PROGRESS NOTES
Vancomycin Dosing by Pharmacy- FOLLOW-UP (HEMODIALYSIS)    Batsheva Page is a 48 y.o. year old female who Pharmacy has been consulted for vancomycin dosing for Vancomycin Indications: Other: line infection . Based on the patient's indication and renal status this patient will be dosed based on a pre-HD level of 20-25 mcg/mL.     Patient is currently on hemodialysis MWF.    Current vancomycin regimen or maintenance dose:  500 mg after each dialysis session     Vancomycin pre-HD level 47.5 mcg/mL    Lab Results   Component Value Date    VANCORANDOM 47.5 (H) 08/21/2024    VANCOTROUGH 27.9 (HH) 09/26/2022       Visit Vitals  /70 (BP Location: Left arm, Patient Position: Lying)   Pulse 57   Temp 36.7 °C (98.1 °F) (Oral)   Resp 16        Lab Results   Component Value Date    CREATININE  08/21/2024      Comment:      Pending.    CREATININE 7.90 (H) 08/20/2024    CREATININE 10.40 (H) 08/19/2024    CREATININE 9.70 (H) 08/18/2024       I/O last 3 completed shifts:  In: 606.9 (9.3 mL/kg) [P.O.:265; I.V.:41.9 (0.6 mL/kg); IV Piggyback:300]  Out: 10 (0.2 mL/kg) [Blood:10]  Weight: 65 kg     Assessment/Plan     Level is above target trough goal  Hold post-HD Vancomycin dose today  Next pre-HD level will be obtained on 8/23 at 0500. May be obtained sooner if clinically indicated.    Will continue to monitor renal function daily while on vancomycin and order serum creatinine at least every 48 hours if not already ordered.  Follow for continued vancomycin needs, clinical response, and signs/symptoms of toxicity.     Michelle Patel, [facility]

## 2024-08-21 NOTE — PROGRESS NOTES
"Batsheva Page is a 48 y.o. female on day 3 of admission presenting drainage from dialysis catheter site on the left side of the chest.     Subjective   She had no acute complaints today.  A right neck dialysis catheter was placed yesterday and the dialysis catheter in the left side of the chest was removed.    Objective     Physical Exam  General: awake, alert, oriented, responsive  Cardiovascular: regular, normal S1 and S2  Neck: there was right neck dialysis catheter in place.   Abdomen: soft, nontender, bowel sounds present, normoactive  Extremities: no peripheral cyanosis, no pedal edema  Neuro: alert, oriented x 3, no focal weakness      Last Recorded Vitals  Blood pressure 124/70, pulse 57, temperature 36.7 °C (98.1 °F), temperature source Oral, resp. rate 16, height 1.676 m (5' 6\"), weight 65 kg (143 lb 4.8 oz), SpO2 100%.  Intake/Output last 3 Shifts:  I/O last 3 completed shifts:  In: 606.9 (9.3 mL/kg) [P.O.:265; I.V.:41.9 (0.6 mL/kg); IV Piggyback:300]  Out: 10 (0.2 mL/kg) [Blood:10]  Weight: 65 kg     Relevant Results  Lab Results   Component Value Date    WBC 4.6 08/21/2024    HGB 9.6 (L) 08/21/2024    HCT 32.2 (L) 08/21/2024    MCV 92 08/21/2024     08/21/2024     Lab Results   Component Value Date    GLUCOSE 132 (H) 08/21/2024    CALCIUM 7.8 (L) 08/21/2024     08/21/2024    K 4.1 08/21/2024    CO2 22 (L) 08/21/2024    CL 95 (L) 08/21/2024    BUN 48 (H) 08/21/2024    CREATININE 9.30 (H) 08/21/2024     Scheduled medications  atorvastatin, 40 mg, oral, Nightly  calcitriol, 0.5 mcg, oral, Daily  epoetin brandy-epbx, 6,500 Units, subcutaneous, Once per day on Monday Wednesday Friday  heparin (porcine), 5,000 Units, subcutaneous, q8h  levETIRAcetam, 750 mg, oral, Nightly  metoprolol tartrate, 25 mg, oral, BID  mirtazapine, 15 mg, oral, Nightly  piperacillin-tazobactam, 2.25 g, intravenous, q8h  pregabalin, 50 mg, oral, BID  sodium zirconium cyclosilicate, 10 g, oral, Daily      Continuous " medications  dextrose 5%-0.45 % sodium chloride, 50 mL/hr      PRN medications  PRN medications: acetaminophen **OR** acetaminophen **OR** acetaminophen, albuterol, benzocaine-menthol, dextromethorphan-guaifenesin, diphenhydrAMINE, fentaNYL PF, guaiFENesin, heparin, HYDROmorphone, HYDROmorphone, HYDROmorphone, ipratropium, labetaloL, lidocaine PF, melatonin, meperidine, ondansetron ODT **OR** ondansetron, oxygen, vancomycin      Assessment/Plan   Tunneled dialysis catheter site infection, 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   Right neck dialysis catheter was placed by IR on 8/20 and the left sided dialysis catheter was removed.  Blood cultures from 8/18 are in progress  Blood culture from 8/19 is in progress  She is on IV Zosyn and vancomycin.  ID following.    End-stage renal disease  On hemodialysis per nephrology.    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    Seizure disorder  On Keppra.    Plan  Antibiotics per ID  Dialysis per nephrology    Jamil Steinberg MD

## 2024-08-21 NOTE — NURSING NOTE
Patient with Rt internal jugular leenaurkar, dressing D&I, pigtail flushes easily and with positive blood return, clamped and curos cap applied.

## 2024-08-21 NOTE — POST-PROCEDURE NOTE
.Report to Receiving RN:    Report To:Mariam Oconnor    Time Report Called: 1925  Hand-Off Communication: Pt treatment ended 14 minutes early to complaint of pain in her neck from catheter placement. Pt rated the pain a 10 from scale 1-10. Pt removed 1.3 liters of fluid. Post vitals are 147/75 HR 54 temp 36.0.   Complications During Treatment: No  Ultrafiltration Treatment: No  Medications Administered During Dialysis: No  Blood Products Administered During Dialysis: No  Labs Sent During Dialysis: No  Heparin Drip Rate Changes: No  Dialysis Catheter Dressing: Yes  Last Dressing Change: 8/21/2024    Electronic Signatures:   (Signed Jeferson Fairbanks    Last Updated: 7:25 PM by JEFERSON FAIRBANKS

## 2024-08-21 NOTE — CARE PLAN
The patient's goals for the shift include      The clinical goals for the shift include control pain      Problem: Pain - Adult  Goal: Verbalizes/displays adequate comfort level or baseline comfort level  Flowsheets (Taken 8/20/2024 2243)  Verbalizes/displays adequate comfort level or baseline comfort level:   Encourage patient to monitor pain and request assistance   Assess pain using appropriate pain scale   Administer analgesics based on type and severity of pain and evaluate response   Implement non-pharmacological measures as appropriate and evaluate response     Problem: Safety - Adult  Goal: Free from fall injury  Flowsheets (Taken 8/20/2024 2243)  Free from fall injury:   Instruct family/caregiver on patient safety   Based on caregiver fall risk screen, instruct family/caregiver to ask for assistance with transferring infant if caregiver noted to have fall risk factors     Problem: Discharge Planning  Goal: Discharge to home or other facility with appropriate resources  Flowsheets (Taken 8/20/2024 2243)  Discharge to home or other facility with appropriate resources:   Identify barriers to discharge with patient and caregiver   Identify discharge learning needs (meds, wound care, etc)   Refer to discharge planning if patient needs post-hospital services based on physician order or complex needs related to functional status, cognitive ability or social support system     Problem: Chronic Conditions and Co-morbidities  Goal: Patient's chronic conditions and co-morbidity symptoms are monitored and maintained or improved  Flowsheets (Taken 8/20/2024 2243)  Care Plan - Patient's Chronic Conditions and Co-Morbidity Symptoms are Monitored and Maintained or Improved:   Monitor and assess patient's chronic conditions and comorbid symptoms for stability, deterioration, or improvement   Collaborate with multidisciplinary team to address chronic and comorbid conditions and prevent exacerbation or deterioration   Update  acute care plan with appropriate goals if chronic or comorbid symptoms are exacerbated and prevent overall improvement and discharge

## 2024-08-21 NOTE — PROCEDURES
Report from Sending RN:    Report From: Alex David  Recent Surgery of Procedure: Yes, Old dialysis catheter removed and a temporary dialysis catheter placed yesterday.   Baseline Level of Consciousness (LOC): A&Ox4  Oxygen Use: No  Type: RA  Diabetic: No  Last BP Med Given Day of Dialysis: See MAR  Last Pain Med Given: See MAR  Lab Tests to be Obtained with Dialysis: No  Blood Transfusion to be Given During Dialysis: No  Available IV Access: Yes  Medications to be Administered During Dialysis: No  Continuous IV Infusion Running: No  Restraints on Currently or in the Last 24 Hours: No  Hand-Off Communication: DNR DNI; Floor RN to give her pain meds pre HD, Pt refusing blood thinners.

## 2024-08-21 NOTE — PROGRESS NOTES
Seeing patient for left TDC tunnel site infection in complex end-stage renal disease patient on hemodialysis.  IR removed the infected left neck TDC on 8/20/24 and a temporary dialysis line was able to be place in the right internal jugular position.  She is resting comfortably today.     36.7   36.7  Chest: Left IJ TDC removed with clean dressing.  Still some left chest wall tendereness and erythema.  New temporary dialysis catheter in the right internal jugular vein.     WBC: 4.6  Creatinine: 7.9     Blood culture (8/19 peripherally): X 2-NGTD  Blood culture (8/19 TDC): X 1-NGTD  Tunnel abscess site culture (8/19): NGTD     Impression:  1.  TDC associated tunnel infection  -- Patient recently had her TDC changed over guidewire on 8/14/2024.  Shortly thereafter she began to develop increasing pain, erythema and more recently gross pus draining from the tunnel site.  -- This is the patient's last available access site for dialysis catheter.  -- IR removed left internal jugular TDC and placed new right temporary internal jugular dialysis catheter on 8/20/24.     Plan:  1.  Continue Zosyn.  Monitor for adverse antibiotic events.  2.  Continue vancomycin with dialysis.  3.  Await pending blood and tunnel site culture results.  4.  If cultures remain sterile tomorrow would be OK with placing new right chest TDC.  Discussed with Dr. BRITTANY Pedersen.    Dr. Soto to assume care on 8/22/24.     Esa Hi MD  [facility]  [phone]

## 2024-08-21 NOTE — PROGRESS NOTES
08/21/24 1338   Discharge Planning   Expected Discharge Disposition Home     On IV antibiotics. Cultures pending. ID and nephrology are following.  Will need a new tunneled dialysis catheter placed prior to discharge. [facility] score is 24/24.  Will discharge home no skilled needs when medically ready. Will continue to follow.  Please notify Care Coordination if needs arise.

## 2024-08-21 NOTE — CARE PLAN
The patient's goals for the shift include      The clinical goals for the shift include pain control    Over the shift, the patient did not make progress toward the following goals. Barriers to progression include timely administration of pain meds. Recommendations to address these barriers include hourly rounding.

## 2024-08-22 ENCOUNTER — DOCUMENTATION (OUTPATIENT)
Dept: RESEARCH | Age: 49
End: 2024-08-22
Payer: MEDICARE

## 2024-08-22 LAB
ALBUMIN SERPL-MCNC: 3.1 G/DL (ref 3.5–5)
ANION GAP SERPL CALC-SCNC: 15 MMOL/L
B-LACTAMASE ORGANISM ISLT: POSITIVE
BACTERIA SPEC CULT: ABNORMAL
BACTERIA SPEC CULT: ABNORMAL
BASOPHILS # BLD AUTO: 0.02 X10*3/UL (ref 0–0.1)
BASOPHILS NFR BLD AUTO: 0.6 %
BUN SERPL-MCNC: 20 MG/DL (ref 8–25)
CALCIUM SERPL-MCNC: 7.8 MG/DL (ref 8.5–10.4)
CHLORIDE SERPL-SCNC: 98 MMOL/L (ref 97–107)
CO2 SERPL-SCNC: 25 MMOL/L (ref 24–31)
CREAT SERPL-MCNC: 5.2 MG/DL (ref 0.4–1.6)
EGFRCR SERPLBLD CKD-EPI 2021: 10 ML/MIN/1.73M*2
EOSINOPHIL # BLD AUTO: 0.37 X10*3/UL (ref 0–0.7)
EOSINOPHIL NFR BLD AUTO: 10.6 %
ERYTHROCYTE [DISTWIDTH] IN BLOOD BY AUTOMATED COUNT: 19.5 % (ref 11.5–14.5)
GLUCOSE SERPL-MCNC: 121 MG/DL (ref 65–99)
GRAM STN SPEC: ABNORMAL
GRAM STN SPEC: ABNORMAL
HCT VFR BLD AUTO: 31.9 % (ref 36–46)
HGB BLD-MCNC: 9.5 G/DL (ref 12–16)
IMM GRANULOCYTES # BLD AUTO: 0.01 X10*3/UL (ref 0–0.7)
IMM GRANULOCYTES NFR BLD AUTO: 0.3 % (ref 0–0.9)
LYMPHOCYTES # BLD AUTO: 0.89 X10*3/UL (ref 1.2–4.8)
LYMPHOCYTES NFR BLD AUTO: 25.6 %
MCH RBC QN AUTO: 27.5 PG (ref 26–34)
MCHC RBC AUTO-ENTMCNC: 29.8 G/DL (ref 32–36)
MCV RBC AUTO: 93 FL (ref 80–100)
MONOCYTES # BLD AUTO: 0.23 X10*3/UL (ref 0.1–1)
MONOCYTES NFR BLD AUTO: 6.6 %
NEUTROPHILS # BLD AUTO: 1.96 X10*3/UL (ref 1.2–7.7)
NEUTROPHILS NFR BLD AUTO: 56.3 %
NRBC BLD-RTO: 0 /100 WBCS (ref 0–0)
PHOSPHATE SERPL-MCNC: 4.4 MG/DL (ref 2.5–4.5)
PLATELET # BLD AUTO: 168 X10*3/UL (ref 150–450)
POTASSIUM SERPL-SCNC: 3.6 MMOL/L (ref 3.4–5.1)
RBC # BLD AUTO: 3.45 X10*6/UL (ref 4–5.2)
SODIUM SERPL-SCNC: 138 MMOL/L (ref 133–145)
WBC # BLD AUTO: 3.5 X10*3/UL (ref 4.4–11.3)

## 2024-08-22 PROCEDURE — 99232 SBSQ HOSP IP/OBS MODERATE 35: CPT | Performed by: INTERNAL MEDICINE

## 2024-08-22 PROCEDURE — 2500000004 HC RX 250 GENERAL PHARMACY W/ HCPCS (ALT 636 FOR OP/ED): Performed by: INTERNAL MEDICINE

## 2024-08-22 PROCEDURE — 2500000001 HC RX 250 WO HCPCS SELF ADMINISTERED DRUGS (ALT 637 FOR MEDICARE OP): Performed by: INTERNAL MEDICINE

## 2024-08-22 PROCEDURE — 1200000002 HC GENERAL ROOM WITH TELEMETRY DAILY

## 2024-08-22 PROCEDURE — 85025 COMPLETE CBC W/AUTO DIFF WBC: CPT | Performed by: INTERNAL MEDICINE

## 2024-08-22 PROCEDURE — 84100 ASSAY OF PHOSPHORUS: CPT | Performed by: INTERNAL MEDICINE

## 2024-08-22 PROCEDURE — 36415 COLL VENOUS BLD VENIPUNCTURE: CPT | Performed by: INTERNAL MEDICINE

## 2024-08-22 PROCEDURE — 2500000002 HC RX 250 W HCPCS SELF ADMINISTERED DRUGS (ALT 637 FOR MEDICARE OP, ALT 636 FOR OP/ED): Performed by: INTERNAL MEDICINE

## 2024-08-22 RX ORDER — HEPARIN SODIUM 1000 [USP'U]/ML
1000 INJECTION, SOLUTION INTRAVENOUS; SUBCUTANEOUS
Status: CANCELLED | OUTPATIENT
Start: 2024-08-22

## 2024-08-22 RX ORDER — DIPHENHYDRAMINE HYDROCHLORIDE 50 MG/ML
50 INJECTION INTRAMUSCULAR; INTRAVENOUS ONCE
Status: COMPLETED | OUTPATIENT
Start: 2024-08-23 | End: 2024-08-23

## 2024-08-22 RX ADMIN — PREGABALIN 50 MG: 25 CAPSULE ORAL at 21:47

## 2024-08-22 RX ADMIN — CALCITRIOL CAPSULES 0.25 MCG 0.5 MCG: 0.25 CAPSULE ORAL at 09:38

## 2024-08-22 RX ADMIN — PIPERACILLIN SODIUM AND TAZOBACTAM SODIUM 2.25 G: 2; .25 INJECTION, SOLUTION INTRAVENOUS at 11:18

## 2024-08-22 RX ADMIN — DIPHENHYDRAMINE HYDROCHLORIDE 25 MG: 50 INJECTION, SOLUTION INTRAMUSCULAR; INTRAVENOUS at 23:58

## 2024-08-22 RX ADMIN — METOPROLOL TARTRATE 25 MG: 25 TABLET, FILM COATED ORAL at 09:39

## 2024-08-22 RX ADMIN — PREGABALIN 50 MG: 25 CAPSULE ORAL at 09:39

## 2024-08-22 RX ADMIN — HYDROMORPHONE HYDROCHLORIDE 0.4 MG: 1 INJECTION, SOLUTION INTRAMUSCULAR; INTRAVENOUS; SUBCUTANEOUS at 11:18

## 2024-08-22 RX ADMIN — MIRTAZAPINE 15 MG: 15 TABLET, FILM COATED ORAL at 21:47

## 2024-08-22 RX ADMIN — PIPERACILLIN SODIUM AND TAZOBACTAM SODIUM 2.25 G: 2; .25 INJECTION, SOLUTION INTRAVENOUS at 04:19

## 2024-08-22 RX ADMIN — ATORVASTATIN CALCIUM 40 MG: 40 TABLET, FILM COATED ORAL at 21:48

## 2024-08-22 RX ADMIN — HYDROMORPHONE HYDROCHLORIDE 0.4 MG: 1 INJECTION, SOLUTION INTRAMUSCULAR; INTRAVENOUS; SUBCUTANEOUS at 21:23

## 2024-08-22 RX ADMIN — LEVETIRACETAM 750 MG: 750 TABLET, FILM COATED ORAL at 21:00

## 2024-08-22 RX ADMIN — METOPROLOL TARTRATE 25 MG: 25 TABLET, FILM COATED ORAL at 21:47

## 2024-08-22 RX ADMIN — DIPHENHYDRAMINE HYDROCHLORIDE 25 MG: 50 INJECTION, SOLUTION INTRAMUSCULAR; INTRAVENOUS at 08:00

## 2024-08-22 RX ADMIN — DIPHENHYDRAMINE HYDROCHLORIDE 25 MG: 50 INJECTION, SOLUTION INTRAMUSCULAR; INTRAVENOUS at 17:12

## 2024-08-22 RX ADMIN — HYDROMORPHONE HYDROCHLORIDE 0.4 MG: 1 INJECTION, SOLUTION INTRAMUSCULAR; INTRAVENOUS; SUBCUTANEOUS at 15:47

## 2024-08-22 RX ADMIN — SODIUM ZIRCONIUM CYCLOSILICATE 10 G: 10 POWDER, FOR SUSPENSION ORAL at 09:39

## 2024-08-22 RX ADMIN — HYDROMORPHONE HYDROCHLORIDE 0.4 MG: 1 INJECTION, SOLUTION INTRAMUSCULAR; INTRAVENOUS; SUBCUTANEOUS at 08:00

## 2024-08-22 ASSESSMENT — PAIN - FUNCTIONAL ASSESSMENT
PAIN_FUNCTIONAL_ASSESSMENT: 0-10
PAIN_FUNCTIONAL_ASSESSMENT: 0-10
PAIN_FUNCTIONAL_ASSESSMENT: FLACC (FACE, LEGS, ACTIVITY, CRY, CONSOLABILITY)

## 2024-08-22 ASSESSMENT — PAIN SCALES - GENERAL
PAINLEVEL_OUTOF10: 10 - WORST POSSIBLE PAIN
PAINLEVEL_OUTOF10: 7
PAINLEVEL_OUTOF10: 7
PAINLEVEL_OUTOF10: 10 - WORST POSSIBLE PAIN
PAINLEVEL_OUTOF10: 10 - WORST POSSIBLE PAIN

## 2024-08-22 ASSESSMENT — COGNITIVE AND FUNCTIONAL STATUS - GENERAL
MOBILITY SCORE: 24
DAILY ACTIVITIY SCORE: 24

## 2024-08-22 ASSESSMENT — PAIN DESCRIPTION - LOCATION
LOCATION: NECK
LOCATION: NECK

## 2024-08-22 ASSESSMENT — PAIN DESCRIPTION - ORIENTATION
ORIENTATION: RIGHT;LEFT
ORIENTATION: RIGHT;LEFT

## 2024-08-22 ASSESSMENT — PAIN DESCRIPTION - DESCRIPTORS: DESCRIPTORS: ACHING

## 2024-08-22 NOTE — PROGRESS NOTES
Seeing patient for left TDC tunnel site infection in complex end-stage renal disease patient on hemodialysis.  IR removed the infected left neck TDC on 8/20/24 and a temporary dialysis line was able to be place in the right internal jugular position.  She is resting comfortably today.     Examination:  Afebrile, VSS  Chest: Left chest wall erythema and tenderness have resolved.  Minimal drainage.  New temporary dialysis catheter in the right internal jugular vein.     Laboratory:  WBC: 3500     Blood culture (8/19 peripherally): X 2-NGTD  Blood culture (8/19 TDC): X 1-NGTD  Tunnel abscess site culture (8/19): Rare Staph aureus     Impression:  1.  TDC associated tunnel infection  -- Patient recently had her TDC changed over guidewire on 8/14/2024.  Shortly thereafter she began to develop increasing pain, erythema and more recently gross pus draining from the tunnel site.  IR removed left internal jugular TDC and placed new right temporary internal jugular dialysis catheter on 8/20/24.  Cultures remain sterile and the wound is growing Staph aureus, CYNTHIA pending     Plan:  1.  Stop Zosyn  2.  Continue vancomycin with dialysis.  3.  Await final blood and tunnel site culture results.  4.  OK for placement of new tunneled dialysis catheter     Adama Soto MD  [facility]  [phone]

## 2024-08-22 NOTE — CARE PLAN
The patient's goals for the shift include      The clinical goals for the shift include pain management    Problem: Pain - Adult  Goal: Verbalizes/displays adequate comfort level or baseline comfort level  Outcome: Progressing     Problem: Safety - Adult  Goal: Free from fall injury  Outcome: Progressing     Problem: Discharge Planning  Goal: Discharge to home or other facility with appropriate resources  Outcome: Progressing     Problem: Chronic Conditions and Co-morbidities  Goal: Patient's chronic conditions and co-morbidity symptoms are monitored and maintained or improved  Outcome: Progressing     Problem: Fall/Injury  Goal: Not fall by end of shift  Outcome: Progressing  Goal: Be free from injury by end of the shift  Outcome: Progressing  Goal: Verbalize understanding of personal risk factors for fall in the hospital  Outcome: Progressing  Goal: Verbalize understanding of risk factor reduction measures to prevent injury from fall in the home  Outcome: Progressing  Goal: Use assistive devices by end of the shift  Outcome: Progressing  Goal: Pace activities to prevent fatigue by end of the shift  Outcome: Progressing     Problem: Pain  Goal: Takes deep breaths with improved pain control throughout the shift  Outcome: Progressing  Goal: Turns in bed with improved pain control throughout the shift  Outcome: Progressing  Goal: Walks with improved pain control throughout the shift  Outcome: Progressing  Goal: Performs ADL's with improved pain control throughout shift  Outcome: Progressing  Goal: Participates in PT with improved pain control throughout the shift  Outcome: Progressing  Goal: Free from opioid side effects throughout the shift  Outcome: Progressing  Goal: Free from acute confusion related to pain meds throughout the shift  Outcome: Progressing

## 2024-08-22 NOTE — PROGRESS NOTES
08/22/24 1435   Discharge Planning   Expected Discharge Disposition Home     Anticipate discharge soon. ID and nephrology following.  Needs a new tunneled dialysis catheter placed prior to discharge. IV antibiotics discontinued.  Will receive Vancomycin with dialysis. Will discharge home with no skilled needs.  Will continue to follow. Please notify Care Coordination if needs arise.

## 2024-08-22 NOTE — PROGRESS NOTES
"Batsheva Page is a 48 y.o. female on day 4 of admission presenting drainage from dialysis catheter site on the left side of the chest.     Subjective   She still has pain at the left chest previous dialysis catheter site and has some discomfort on the right neck site.     Objective     Physical Exam  General: awake, alert, oriented, responsive  Cardiovascular: regular, normal S1 and S2  Neck: there was right neck dialysis catheter in place.. No erythema or bleeding or warmth.  There was some tenderness at the site, no swelling  Chest: There was a dressing over the left upper chest previous dialysis catheter site.  No bleeding or drainage noted.  Extremities: no peripheral cyanosis, no pedal edema  Neuro: alert, oriented x 3, no focal weakness      Last Recorded Vitals  Blood pressure 147/55, pulse 57, temperature 36.7 °C (98.1 °F), temperature source Oral, resp. rate 16, height 1.676 m (5' 6\"), weight 65 kg (143 lb 4.8 oz), SpO2 100%.  Intake/Output last 3 Shifts:  I/O last 3 completed shifts:  In: 2150 (33.1 mL/kg) [P.O.:850; I.V.:800 (12.3 mL/kg); Other:400; IV Piggyback:100]  Out: 1756 (27 mL/kg) [Other:1756]  Weight: 65 kg     Relevant Results  Lab Results   Component Value Date    WBC 3.5 (L) 08/22/2024    HGB 9.5 (L) 08/22/2024    HCT 31.9 (L) 08/22/2024    MCV 93 08/22/2024     08/22/2024     Lab Results   Component Value Date    GLUCOSE 121 (H) 08/22/2024    CALCIUM 7.8 (L) 08/22/2024     08/22/2024    K 3.6 08/22/2024    CO2 25 08/22/2024    CL 98 08/22/2024    BUN 20 08/22/2024    CREATININE 5.20 (H) 08/22/2024     Scheduled medications  atorvastatin, 40 mg, oral, Nightly  calcitriol, 0.5 mcg, oral, Daily  epoetin brandy-epbx, 6,500 Units, subcutaneous, Once per day on Monday Wednesday Friday  heparin (porcine), 5,000 Units, subcutaneous, q8h  heparin, 1,000 Units, intra-catheter, After Dialysis  heparin, 1,000 Units, intra-catheter, After Dialysis  levETIRAcetam, 750 mg, oral, " Nightly  metoprolol tartrate, 25 mg, oral, BID  mirtazapine, 15 mg, oral, Nightly  piperacillin-tazobactam, 2.25 g, intravenous, q8h  pregabalin, 50 mg, oral, BID  sodium zirconium cyclosilicate, 10 g, oral, Daily      Continuous medications       PRN medications  PRN medications: acetaminophen **OR** acetaminophen **OR** acetaminophen, benzocaine-menthol, dextromethorphan-guaifenesin, diphenhydrAMINE, guaiFENesin, heparin, HYDROmorphone, HYDROmorphone, lidocaine PF, melatonin, ondansetron ODT **OR** ondansetron, oxygen, vancomycin      Assessment/Plan   Tunneled dialysis catheter site infection, 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   Right neck dialysis catheter was placed by IR on 8/20 and the left sided dialysis catheter was removed.  Blood cultures from 8/18 are in progress  Blood culture from 8/19 is in progress  Wound culture from 8/19 was positive for rare Staphylococcus aureus  She is on IV Zosyn and vancomycin.  ID following.  Pain medication as needed.    End-stage renal disease  On hemodialysis per nephrology.    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    Seizure disorder  On Keppra.    Plan  Antibiotics per ID  Dialysis per nephrology  New tunneled dialysis catheter to be placed when okay with ID.    Jamil Steinberg MD

## 2024-08-22 NOTE — CARE PLAN
Problem: Pain - Adult  Goal: Verbalizes/displays adequate comfort level or baseline comfort level  Outcome: Progressing     Problem: Safety - Adult  Goal: Free from fall injury  Outcome: Progressing     Problem: Discharge Planning  Goal: Discharge to home or other facility with appropriate resources  Outcome: Progressing     Problem: Chronic Conditions and Co-morbidities  Goal: Patient's chronic conditions and co-morbidity symptoms are monitored and maintained or improved  Outcome: Progressing     Problem: Fall/Injury  Goal: Not fall by end of shift  Outcome: Progressing  Goal: Be free from injury by end of the shift  Outcome: Progressing  Goal: Verbalize understanding of personal risk factors for fall in the hospital  Outcome: Progressing  Goal: Verbalize understanding of risk factor reduction measures to prevent injury from fall in the home  Outcome: Progressing  Goal: Use assistive devices by end of the shift  Outcome: Progressing  Goal: Pace activities to prevent fatigue by end of the shift  Outcome: Progressing     Problem: Pain  Goal: Takes deep breaths with improved pain control throughout the shift  Outcome: Progressing  Goal: Turns in bed with improved pain control throughout the shift  Outcome: Progressing  Goal: Walks with improved pain control throughout the shift  Outcome: Progressing  Goal: Performs ADL's with improved pain control throughout shift  Outcome: Progressing  Goal: Participates in PT with improved pain control throughout the shift  Outcome: Progressing  Goal: Free from opioid side effects throughout the shift  Outcome: Progressing  Goal: Free from acute confusion related to pain meds throughout the shift  Outcome: Progressing   The patient's goals for the shift include      The clinical goals for the shift include adequate pain control    Over the shift, the patient did not make progress toward the following goals. Barriers to progression include . Recommendations to address these  barriers include .

## 2024-08-22 NOTE — PROGRESS NOTES
"Batsheva Page is a 48 y.o. female on day 4 of admission presenting with Abscess.    Subjective    patient was admitted to hospital with infected dialysis catheter which was removed dialyzed yesterday tolerated procedure well she feels well no fever no chills       Objective     Physical Exam  Neck:      Vascular: No carotid bruit.   Cardiovascular:      Rate and Rhythm: Normal rate and regular rhythm.      Heart sounds: No murmur heard.     No friction rub. No gallop.   Pulmonary:      Breath sounds: No wheezing, rhonchi or rales.   Chest:      Chest wall: No tenderness.   Abdominal:      General: There is no distension.      Tenderness: There is no abdominal tenderness. There is no guarding or rebound.   Musculoskeletal:         General: No swelling or tenderness.      Cervical back: Neck supple.      Right lower leg: No edema.      Left lower leg: No edema.   Lymphadenopathy:      Cervical: No cervical adenopathy.         Last Recorded Vitals  Blood pressure (!) 124/39, pulse 55, temperature 36.9 °C (98.4 °F), temperature source Oral, resp. rate 17, height 1.676 m (5' 6\"), weight 65 kg (143 lb 4.8 oz), SpO2 99%.    Intake/Output last 3 Shifts:  I/O last 3 completed shifts:  In: 2150 (33.1 mL/kg) [P.O.:850; I.V.:800 (12.3 mL/kg); Other:400; IV Piggyback:100]  Out: 1756 (27 mL/kg) [Other:1756]  Weight: 65 kg     Current Facility-Administered Medications:     acetaminophen (Tylenol) tablet 650 mg, 650 mg, oral, q4h PRN **OR** acetaminophen (Tylenol) oral liquid 650 mg, 650 mg, nasogastric tube, q4h PRN **OR** acetaminophen (Tylenol) suppository 650 mg, 650 mg, rectal, q4h PRN, Everett Lopez DO    atorvastatin (Lipitor) tablet 40 mg, 40 mg, oral, Nightly, Everett Lopez DO, 40 mg at 08/21/24 2037    benzocaine-menthol (Cepastat Sore Throat) lozenge 1 lozenge, 1 lozenge, Mouth/Throat, q2h PRN, Everett Lopez DO    calcitriol (Rocaltrol) capsule 0.5 mcg, 0.5 mcg, oral, Daily, Everett Lopez, " DO, 0.5 mcg at 08/22/24 0938    dextromethorphan-guaifenesin (Robitussin DM)  mg/5 mL oral liquid 5 mL, 5 mL, oral, q4h PRN, Everett Lopez DO    diphenhydrAMINE (BENADryl) injection 25 mg, 25 mg, intravenous, q6h PRN, Jamil Steinberg MD, 25 mg at 08/22/24 0800    epoetin brandy-epbx (RETACRIT) injection 6,500 Units, 6,500 Units, subcutaneous, Once per day on Monday Wednesday Friday, Everett Lopez DO, 6,500 Units at 08/21/24 1002    guaiFENesin (Mucinex) 12 hr tablet 600 mg, 600 mg, oral, q12h PRN, Everett Lopez DO    heparin (porcine) injection 5,000 Units, 5,000 Units, subcutaneous, q8h, Everett Lopez DO    heparin 1,000 unit/mL injection 1,000 Units, 1,000 Units, intra-catheter, After Dialysis, Zhou Pedersen MD, 1,200 Units at 08/21/24 1923    heparin 1,000 unit/mL injection 1,000 Units, 1,000 Units, intra-catheter, After Dialysis, Zhou Pedersen MD, 1,200 Units at 08/21/24 1922    heparin 1,000 unit/mL injection, , , PRN, Melvin Medina MD, 2,000 Units at 08/20/24 1217    HYDROmorphone (Dilaudid) injection 0.2 mg, 0.2 mg, intravenous, q3h PRN, Everett Lopez DO, 0.2 mg at 08/20/24 0956    HYDROmorphone (Dilaudid) injection 0.4 mg, 0.4 mg, intravenous, q3h PRN, Everett Lopez DO, 0.4 mg at 08/22/24 1118    levETIRAcetam (Keppra) tablet 750 mg, 750 mg, oral, Nightly, Everett Lopez DO    lidocaine PF (Xylocaine) 10 mg/mL (1 %) injection, , , PRN, Melvin Medina MD, 10 mL at 08/20/24 1151    melatonin tablet 5 mg, 5 mg, oral, Nightly PRN, Everett Lopez DO    metoprolol tartrate (Lopressor) tablet 25 mg, 25 mg, oral, BID, Everett Lopez DO, 25 mg at 08/22/24 0939    mirtazapine (Remeron) tablet 15 mg, 15 mg, oral, Nightly, Everett Lopez,     ondansetron ODT (Zofran-ODT) disintegrating tablet 4 mg, 4 mg, oral, q8h PRN **OR** ondansetron (Zofran) injection 4 mg, 4 mg, intravenous, q8h PRN, Everett Lopez DO    oxygen (O2) therapy, ,  inhalation, Continuous PRN - O2/gases, Everett Lopez DO    piperacillin-tazobactam (Zosyn) 2.25 g in dextrose (iso) IV 50 mL, 2.25 g, intravenous, q8h, Everett Lopez DO, Stopped at 08/22/24 1148    pregabalin (Lyrica) capsule 50 mg, 50 mg, oral, BID, Everett Lopez DO, 50 mg at 08/22/24 0939    sodium zirconium cyclosilicate (Lokelma) packet 10 g, 10 g, oral, Daily, Zhou Pedersen MD, 10 g at 08/22/24 0939    vancomycin (Vancocin) pharmacy to dose - pharmacy monitoring, , miscellaneous, Daily PRN, Everett Lopez DO   Relevant Results    Results for orders placed or performed during the hospital encounter of 08/18/24 (from the past 96 hour(s))   Magnesium   Result Value Ref Range    Magnesium 1.90 1.60 - 3.10 mg/dL   Blood Gas Lactic Acid, Venous   Result Value Ref Range    POCT Lactate, Venous 1.8 0.4 - 2.0 mmol/L   Blood Culture    Specimen: Peripheral Venipuncture; Blood culture   Result Value Ref Range    Blood Culture No growth at 3 days    Blood Culture    Specimen: Peripheral Venipuncture; Blood culture   Result Value Ref Range    Blood Culture No growth at 3 days    Sars-CoV-2 PCR   Result Value Ref Range    Coronavirus 2019, PCR Not Detected Not Detected   CBC and Auto Differential   Result Value Ref Range    WBC 7.6 4.4 - 11.3 x10*3/uL    nRBC 0.0 0.0 - 0.0 /100 WBCs    RBC 3.54 (L) 4.00 - 5.20 x10*6/uL    Hemoglobin 9.8 (L) 12.0 - 16.0 g/dL    Hematocrit 31.4 (L) 36.0 - 46.0 %    MCV 89 80 - 100 fL    MCH 27.7 26.0 - 34.0 pg    MCHC 31.2 (L) 32.0 - 36.0 g/dL    RDW 19.6 (H) 11.5 - 14.5 %    Platelets 165 150 - 450 x10*3/uL    Neutrophils % 78.2 40.0 - 80.0 %    Immature Granulocytes %, Automated 0.3 0.0 - 0.9 %    Lymphocytes % 10.3 13.0 - 44.0 %    Monocytes % 9.3 2.0 - 10.0 %    Eosinophils % 1.6 0.0 - 6.0 %    Basophils % 0.3 0.0 - 2.0 %    Neutrophils Absolute 5.91 1.20 - 7.70 x10*3/uL    Immature Granulocytes Absolute, Automated 0.02 0.00 - 0.70 x10*3/uL    Lymphocytes Absolute  0.78 (L) 1.20 - 4.80 x10*3/uL    Monocytes Absolute 0.70 0.10 - 1.00 x10*3/uL    Eosinophils Absolute 0.12 0.00 - 0.70 x10*3/uL    Basophils Absolute 0.02 0.00 - 0.10 x10*3/uL   Comprehensive metabolic panel   Result Value Ref Range    Glucose 104 (H) 65 - 99 mg/dL    Sodium 130 (L) 133 - 145 mmol/L    Potassium 3.7 3.4 - 5.1 mmol/L    Chloride 84 (L) 97 - 107 mmol/L    Bicarbonate 21 (L) 24 - 31 mmol/L    Urea Nitrogen 54 (H) 8 - 25 mg/dL    Creatinine 9.70 (H) 0.40 - 1.60 mg/dL    eGFR 5 (L) >60 mL/min/1.73m*2    Calcium 8.1 (L) 8.5 - 10.4 mg/dL    Albumin 3.5 3.5 - 5.0 g/dL    Alkaline Phosphatase 82 35 - 125 U/L    Total Protein 7.8 5.9 - 7.9 g/dL    AST 15 5 - 40 U/L    Bilirubin, Total 0.4 0.1 - 1.2 mg/dL    ALT 7 5 - 40 U/L    Anion Gap >19 (H) <=19 mmol/L   Renal Function Panel   Result Value Ref Range    Glucose 99 65 - 99 mg/dL    Sodium 132 (L) 133 - 145 mmol/L    Potassium 4.2 3.4 - 5.1 mmol/L    Chloride 87 (L) 97 - 107 mmol/L    Bicarbonate 19 (L) 24 - 31 mmol/L    Urea Nitrogen 58 (H) 8 - 25 mg/dL    Creatinine 10.40 (H) 0.40 - 1.60 mg/dL    eGFR 4 (L) >60 mL/min/1.73m*2    Calcium 8.0 (L) 8.5 - 10.4 mg/dL    Phosphorus 6.1 (H) 2.5 - 4.5 mg/dL    Albumin 3.3 (L) 3.5 - 5.0 g/dL    Anion Gap >19 (H) <=19 mmol/L   CBC and Auto Differential   Result Value Ref Range    WBC 6.6 4.4 - 11.3 x10*3/uL    nRBC 0.0 0.0 - 0.0 /100 WBCs    RBC 3.58 (L) 4.00 - 5.20 x10*6/uL    Hemoglobin 9.8 (L) 12.0 - 16.0 g/dL    Hematocrit 32.3 (L) 36.0 - 46.0 %    MCV 90 80 - 100 fL    MCH 27.4 26.0 - 34.0 pg    MCHC 30.3 (L) 32.0 - 36.0 g/dL    RDW 19.9 (H) 11.5 - 14.5 %    Platelets 165 150 - 450 x10*3/uL    Neutrophils % 65.0 40.0 - 80.0 %    Immature Granulocytes %, Automated 0.2 0.0 - 0.9 %    Lymphocytes % 18.6 13.0 - 44.0 %    Monocytes % 11.3 2.0 - 10.0 %    Eosinophils % 4.4 0.0 - 6.0 %    Basophils % 0.5 0.0 - 2.0 %    Neutrophils Absolute 4.32 1.20 - 7.70 x10*3/uL    Immature Granulocytes Absolute, Automated 0.01 0.00  - 0.70 x10*3/uL    Lymphocytes Absolute 1.23 1.20 - 4.80 x10*3/uL    Monocytes Absolute 0.75 0.10 - 1.00 x10*3/uL    Eosinophils Absolute 0.29 0.00 - 0.70 x10*3/uL    Basophils Absolute 0.03 0.00 - 0.10 x10*3/uL   Tissue/Wound Culture/Smear    Specimen: Other (specify in comments); Tissue/Biopsy   Result Value Ref Range    Tissue/Wound Culture/Smear (2+) Few Mixed Aerobic and Anaerobic Bacteria     Beta Lactamase (Cefinase) Positive     Tissue/Wound Culture/Smear (1+) Rare Staphylococcus aureus (A)     Gram Stain No polymorphonuclear leukocytes seen     Gram Stain No organisms seen    Blood Culture    Specimen: Dialysis; Blood culture   Result Value Ref Range    Blood Culture No growth at 2 days    Renal Function Panel   Result Value Ref Range    Glucose 91 65 - 99 mg/dL    Sodium 136 133 - 145 mmol/L    Potassium 5.0 3.4 - 5.1 mmol/L    Chloride 93 (L) 97 - 107 mmol/L    Bicarbonate 22 (L) 24 - 31 mmol/L    Urea Nitrogen 40 (H) 8 - 25 mg/dL    Creatinine 7.90 (H) 0.40 - 1.60 mg/dL    eGFR 6 (L) >60 mL/min/1.73m*2    Calcium 8.5 8.5 - 10.4 mg/dL    Phosphorus 5.3 (H) 2.5 - 4.5 mg/dL    Albumin 3.3 (L) 3.5 - 5.0 g/dL    Anion Gap >19 (H) <=19 mmol/L   CBC and Auto Differential   Result Value Ref Range    WBC 5.5 4.4 - 11.3 x10*3/uL    nRBC 0.0 0.0 - 0.0 /100 WBCs    RBC 3.91 (L) 4.00 - 5.20 x10*6/uL    Hemoglobin 10.9 (L) 12.0 - 16.0 g/dL    Hematocrit 34.8 (L) 36.0 - 46.0 %    MCV 89 80 - 100 fL    MCH 27.9 26.0 - 34.0 pg    MCHC 31.3 (L) 32.0 - 36.0 g/dL    RDW 19.9 (H) 11.5 - 14.5 %    Platelets 159 150 - 450 x10*3/uL    Neutrophils % 64.5 40.0 - 80.0 %    Immature Granulocytes %, Automated 0.4 0.0 - 0.9 %    Lymphocytes % 17.1 13.0 - 44.0 %    Monocytes % 11.2 2.0 - 10.0 %    Eosinophils % 6.1 0.0 - 6.0 %    Basophils % 0.7 0.0 - 2.0 %    Neutrophils Absolute 3.52 1.20 - 7.70 x10*3/uL    Immature Granulocytes Absolute, Automated 0.02 0.00 - 0.70 x10*3/uL    Lymphocytes Absolute 0.93 (L) 1.20 - 4.80 x10*3/uL     Monocytes Absolute 0.61 0.10 - 1.00 x10*3/uL    Eosinophils Absolute 0.33 0.00 - 0.70 x10*3/uL    Basophils Absolute 0.04 0.00 - 0.10 x10*3/uL   Renal Function Panel   Result Value Ref Range    Glucose 132 (H) 65 - 99 mg/dL    Sodium 137 133 - 145 mmol/L    Potassium 4.1 3.4 - 5.1 mmol/L    Chloride 95 (L) 97 - 107 mmol/L    Bicarbonate 22 (L) 24 - 31 mmol/L    Urea Nitrogen 48 (H) 8 - 25 mg/dL    Creatinine 9.30 (H) 0.40 - 1.60 mg/dL    eGFR 5 (L) >60 mL/min/1.73m*2    Calcium 7.8 (L) 8.5 - 10.4 mg/dL    Phosphorus 6.2 (H) 2.5 - 4.5 mg/dL    Albumin 3.3 (L) 3.5 - 5.0 g/dL    Anion Gap >19 (H) <=19 mmol/L   CBC and Auto Differential   Result Value Ref Range    WBC 4.6 4.4 - 11.3 x10*3/uL    nRBC 0.0 0.0 - 0.0 /100 WBCs    RBC 3.49 (L) 4.00 - 5.20 x10*6/uL    Hemoglobin 9.6 (L) 12.0 - 16.0 g/dL    Hematocrit 32.2 (L) 36.0 - 46.0 %    MCV 92 80 - 100 fL    MCH 27.5 26.0 - 34.0 pg    MCHC 29.8 (L) 32.0 - 36.0 g/dL    RDW 19.8 (H) 11.5 - 14.5 %    Platelets 165 150 - 450 x10*3/uL    Neutrophils % 63.9 40.0 - 80.0 %    Immature Granulocytes %, Automated 0.4 0.0 - 0.9 %    Lymphocytes % 20.0 13.0 - 44.0 %    Monocytes % 7.5 2.0 - 10.0 %    Eosinophils % 7.8 0.0 - 6.0 %    Basophils % 0.4 0.0 - 2.0 %    Neutrophils Absolute 2.96 1.20 - 7.70 x10*3/uL    Immature Granulocytes Absolute, Automated 0.02 0.00 - 0.70 x10*3/uL    Lymphocytes Absolute 0.93 (L) 1.20 - 4.80 x10*3/uL    Monocytes Absolute 0.35 0.10 - 1.00 x10*3/uL    Eosinophils Absolute 0.36 0.00 - 0.70 x10*3/uL    Basophils Absolute 0.02 0.00 - 0.10 x10*3/uL   Vancomycin   Result Value Ref Range    Vancomycin 47.5 (H) 10.0 - 20.0 ug/mL   Renal Function Panel   Result Value Ref Range    Glucose 121 (H) 65 - 99 mg/dL    Sodium 138 133 - 145 mmol/L    Potassium 3.6 3.4 - 5.1 mmol/L    Chloride 98 97 - 107 mmol/L    Bicarbonate 25 24 - 31 mmol/L    Urea Nitrogen 20 8 - 25 mg/dL    Creatinine 5.20 (H) 0.40 - 1.60 mg/dL    eGFR 10 (L) >60 mL/min/1.73m*2    Calcium 7.8 (L)  8.5 - 10.4 mg/dL    Phosphorus 4.4 2.5 - 4.5 mg/dL    Albumin 3.1 (L) 3.5 - 5.0 g/dL    Anion Gap 15 <=19 mmol/L   CBC and Auto Differential   Result Value Ref Range    WBC 3.5 (L) 4.4 - 11.3 x10*3/uL    nRBC 0.0 0.0 - 0.0 /100 WBCs    RBC 3.45 (L) 4.00 - 5.20 x10*6/uL    Hemoglobin 9.5 (L) 12.0 - 16.0 g/dL    Hematocrit 31.9 (L) 36.0 - 46.0 %    MCV 93 80 - 100 fL    MCH 27.5 26.0 - 34.0 pg    MCHC 29.8 (L) 32.0 - 36.0 g/dL    RDW 19.5 (H) 11.5 - 14.5 %    Platelets 168 150 - 450 x10*3/uL    Neutrophils % 56.3 40.0 - 80.0 %    Immature Granulocytes %, Automated 0.3 0.0 - 0.9 %    Lymphocytes % 25.6 13.0 - 44.0 %    Monocytes % 6.6 2.0 - 10.0 %    Eosinophils % 10.6 0.0 - 6.0 %    Basophils % 0.6 0.0 - 2.0 %    Neutrophils Absolute 1.96 1.20 - 7.70 x10*3/uL    Immature Granulocytes Absolute, Automated 0.01 0.00 - 0.70 x10*3/uL    Lymphocytes Absolute 0.89 (L) 1.20 - 4.80 x10*3/uL    Monocytes Absolute 0.23 0.10 - 1.00 x10*3/uL    Eosinophils Absolute 0.37 0.00 - 0.70 x10*3/uL    Basophils Absolute 0.02 0.00 - 0.10 x10*3/uL       Assessment/Plan   End-stage kidney disease phosphorus tomorrow morning  Infected dialysis catheter with abscess formation catheter was discontinued by IR continue antibiotic therapy per infectious disease  Status post aortic valve placement  History of endocarditis  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

## 2024-08-23 ENCOUNTER — APPOINTMENT (OUTPATIENT)
Dept: DIALYSIS | Facility: HOSPITAL | Age: 49
End: 2024-08-23
Payer: MEDICARE

## 2024-08-23 LAB
ALBUMIN SERPL-MCNC: 3.2 G/DL (ref 3.5–5)
ANION GAP SERPL CALC-SCNC: 16 MMOL/L
BACTERIA BLD CULT: NORMAL
BASOPHILS # BLD AUTO: 0.02 X10*3/UL (ref 0–0.1)
BASOPHILS NFR BLD AUTO: 0.5 %
BUN SERPL-MCNC: 30 MG/DL (ref 8–25)
CALCIUM SERPL-MCNC: 7.9 MG/DL (ref 8.5–10.4)
CHLORIDE SERPL-SCNC: 99 MMOL/L (ref 97–107)
CO2 SERPL-SCNC: 23 MMOL/L (ref 24–31)
CREAT SERPL-MCNC: 7 MG/DL (ref 0.4–1.6)
EGFRCR SERPLBLD CKD-EPI 2021: 7 ML/MIN/1.73M*2
EOSINOPHIL # BLD AUTO: 0.32 X10*3/UL (ref 0–0.7)
EOSINOPHIL NFR BLD AUTO: 8.6 %
ERYTHROCYTE [DISTWIDTH] IN BLOOD BY AUTOMATED COUNT: 19.1 % (ref 11.5–14.5)
GLUCOSE SERPL-MCNC: 110 MG/DL (ref 65–99)
HCT VFR BLD AUTO: 32.5 % (ref 36–46)
HGB BLD-MCNC: 9.8 G/DL (ref 12–16)
IMM GRANULOCYTES # BLD AUTO: 0.02 X10*3/UL (ref 0–0.7)
IMM GRANULOCYTES NFR BLD AUTO: 0.5 % (ref 0–0.9)
LYMPHOCYTES # BLD AUTO: 1.05 X10*3/UL (ref 1.2–4.8)
LYMPHOCYTES NFR BLD AUTO: 28.4 %
MCH RBC QN AUTO: 28 PG (ref 26–34)
MCHC RBC AUTO-ENTMCNC: 30.2 G/DL (ref 32–36)
MCV RBC AUTO: 93 FL (ref 80–100)
MONOCYTES # BLD AUTO: 0.26 X10*3/UL (ref 0.1–1)
MONOCYTES NFR BLD AUTO: 7 %
NEUTROPHILS # BLD AUTO: 2.03 X10*3/UL (ref 1.2–7.7)
NEUTROPHILS NFR BLD AUTO: 55 %
NRBC BLD-RTO: 0 /100 WBCS (ref 0–0)
PHOSPHATE SERPL-MCNC: 6.1 MG/DL (ref 2.5–4.5)
PLATELET # BLD AUTO: 182 X10*3/UL (ref 150–450)
POTASSIUM SERPL-SCNC: 3.7 MMOL/L (ref 3.4–5.1)
RBC # BLD AUTO: 3.5 X10*6/UL (ref 4–5.2)
SODIUM SERPL-SCNC: 138 MMOL/L (ref 133–145)
VANCOMYCIN SERPL-MCNC: 32 UG/ML (ref 10–20)
WBC # BLD AUTO: 3.7 X10*3/UL (ref 4.4–11.3)

## 2024-08-23 PROCEDURE — 1200000002 HC GENERAL ROOM WITH TELEMETRY DAILY

## 2024-08-23 PROCEDURE — 99232 SBSQ HOSP IP/OBS MODERATE 35: CPT | Performed by: INTERNAL MEDICINE

## 2024-08-23 PROCEDURE — 2500000004 HC RX 250 GENERAL PHARMACY W/ HCPCS (ALT 636 FOR OP/ED): Performed by: INTERNAL MEDICINE

## 2024-08-23 PROCEDURE — 84100 ASSAY OF PHOSPHORUS: CPT | Performed by: INTERNAL MEDICINE

## 2024-08-23 PROCEDURE — 0JH63XZ INSERTION OF TUNNELED VASCULAR ACCESS DEVICE INTO CHEST SUBCUTANEOUS TISSUE AND FASCIA, PERCUTANEOUS APPROACH: ICD-10-PCS | Performed by: FAMILY MEDICINE

## 2024-08-23 PROCEDURE — 6350000001 HC RX 635 EPOETIN >10,000 UNITS: Performed by: INTERNAL MEDICINE

## 2024-08-23 PROCEDURE — 5A1D70Z PERFORMANCE OF URINARY FILTRATION, INTERMITTENT, LESS THAN 6 HOURS PER DAY: ICD-10-PCS | Performed by: FAMILY MEDICINE

## 2024-08-23 PROCEDURE — 8010000001 HC DIALYSIS - HEMODIALYSIS PER DAY

## 2024-08-23 PROCEDURE — 80202 ASSAY OF VANCOMYCIN: CPT

## 2024-08-23 PROCEDURE — 2500000001 HC RX 250 WO HCPCS SELF ADMINISTERED DRUGS (ALT 637 FOR MEDICARE OP): Performed by: INTERNAL MEDICINE

## 2024-08-23 PROCEDURE — 2500000002 HC RX 250 W HCPCS SELF ADMINISTERED DRUGS (ALT 637 FOR MEDICARE OP, ALT 636 FOR OP/ED): Performed by: INTERNAL MEDICINE

## 2024-08-23 PROCEDURE — 36415 COLL VENOUS BLD VENIPUNCTURE: CPT | Performed by: INTERNAL MEDICINE

## 2024-08-23 PROCEDURE — 85025 COMPLETE CBC W/AUTO DIFF WBC: CPT | Performed by: INTERNAL MEDICINE

## 2024-08-23 PROCEDURE — 02HV33Z INSERTION OF INFUSION DEVICE INTO SUPERIOR VENA CAVA, PERCUTANEOUS APPROACH: ICD-10-PCS | Performed by: FAMILY MEDICINE

## 2024-08-23 RX ORDER — OXYCODONE HYDROCHLORIDE 5 MG/1
5 TABLET ORAL EVERY 4 HOURS PRN
Status: DISCONTINUED | OUTPATIENT
Start: 2024-08-23 | End: 2024-08-29 | Stop reason: HOSPADM

## 2024-08-23 RX ADMIN — SODIUM ZIRCONIUM CYCLOSILICATE 10 G: 10 POWDER, FOR SUSPENSION ORAL at 12:20

## 2024-08-23 RX ADMIN — ATORVASTATIN CALCIUM 40 MG: 40 TABLET, FILM COATED ORAL at 20:21

## 2024-08-23 RX ADMIN — CALCITRIOL CAPSULES 0.25 MCG 0.5 MCG: 0.25 CAPSULE ORAL at 12:21

## 2024-08-23 RX ADMIN — METOPROLOL TARTRATE 25 MG: 25 TABLET, FILM COATED ORAL at 12:21

## 2024-08-23 RX ADMIN — HEPARIN SODIUM 1000 UNITS: 1000 INJECTION, SOLUTION INTRAVENOUS; SUBCUTANEOUS at 11:35

## 2024-08-23 RX ADMIN — HYDROMORPHONE HYDROCHLORIDE 0.4 MG: 1 INJECTION, SOLUTION INTRAMUSCULAR; INTRAVENOUS; SUBCUTANEOUS at 23:33

## 2024-08-23 RX ADMIN — HYDROMORPHONE HYDROCHLORIDE 0.4 MG: 1 INJECTION, SOLUTION INTRAMUSCULAR; INTRAVENOUS; SUBCUTANEOUS at 20:21

## 2024-08-23 RX ADMIN — HYDROMORPHONE HYDROCHLORIDE 0.4 MG: 1 INJECTION, SOLUTION INTRAMUSCULAR; INTRAVENOUS; SUBCUTANEOUS at 02:34

## 2024-08-23 RX ADMIN — EPOETIN ALFA-EPBX 6500 UNITS: 20000 INJECTION, SOLUTION INTRAVENOUS; SUBCUTANEOUS at 12:29

## 2024-08-23 RX ADMIN — DIPHENHYDRAMINE HYDROCHLORIDE 25 MG: 50 INJECTION, SOLUTION INTRAMUSCULAR; INTRAVENOUS at 22:29

## 2024-08-23 RX ADMIN — PREGABALIN 50 MG: 25 CAPSULE ORAL at 12:20

## 2024-08-23 RX ADMIN — MIRTAZAPINE 15 MG: 15 TABLET, FILM COATED ORAL at 20:21

## 2024-08-23 RX ADMIN — HEPARIN SODIUM 1000 UNITS: 1000 INJECTION, SOLUTION INTRAVENOUS; SUBCUTANEOUS at 11:36

## 2024-08-23 RX ADMIN — METOPROLOL TARTRATE 25 MG: 25 TABLET, FILM COATED ORAL at 20:21

## 2024-08-23 RX ADMIN — DIPHENHYDRAMINE HYDROCHLORIDE 50 MG: 50 INJECTION, SOLUTION INTRAMUSCULAR; INTRAVENOUS at 08:32

## 2024-08-23 RX ADMIN — PREGABALIN 50 MG: 25 CAPSULE ORAL at 20:20

## 2024-08-23 RX ADMIN — HYDROMORPHONE HYDROCHLORIDE 0.4 MG: 1 INJECTION, SOLUTION INTRAMUSCULAR; INTRAVENOUS; SUBCUTANEOUS at 16:41

## 2024-08-23 RX ADMIN — HYDROMORPHONE HYDROCHLORIDE 0.4 MG: 1 INJECTION, SOLUTION INTRAMUSCULAR; INTRAVENOUS; SUBCUTANEOUS at 12:22

## 2024-08-23 RX ADMIN — DIPHENHYDRAMINE HYDROCHLORIDE 25 MG: 50 INJECTION, SOLUTION INTRAMUSCULAR; INTRAVENOUS at 15:38

## 2024-08-23 ASSESSMENT — PAIN - FUNCTIONAL ASSESSMENT
PAIN_FUNCTIONAL_ASSESSMENT: 0-10
PAIN_FUNCTIONAL_ASSESSMENT: NO/DENIES PAIN
PAIN_FUNCTIONAL_ASSESSMENT: 0-10

## 2024-08-23 ASSESSMENT — PAIN SCALES - GENERAL
PAINLEVEL_OUTOF10: 7
PAINLEVEL_OUTOF10: 0 - NO PAIN
PAINLEVEL_OUTOF10: 10 - WORST POSSIBLE PAIN
PAINLEVEL_OUTOF10: 0 - NO PAIN
PAINLEVEL_OUTOF10: 10 - WORST POSSIBLE PAIN
PAINLEVEL_OUTOF10: 0 - NO PAIN
PAINLEVEL_OUTOF10: 7

## 2024-08-23 ASSESSMENT — PAIN DESCRIPTION - DESCRIPTORS
DESCRIPTORS: ACHING

## 2024-08-23 ASSESSMENT — PAIN DESCRIPTION - LOCATION
LOCATION: NECK

## 2024-08-23 ASSESSMENT — PAIN DESCRIPTION - ORIENTATION
ORIENTATION: RIGHT;LEFT
ORIENTATION: LEFT;RIGHT

## 2024-08-23 NOTE — PROGRESS NOTES
Reason for consultation:  Left TDC tunnel site infection     Subjective:  Feels okay  Continues to complain of tenderness over the clavicular insertion of the left sternocleidomastoid muscle  Anxious about placement of a new tunneled catheter, which she has been told will take place on Monday 8/26    Examination:  Afebrile, VSS  Chest: Left chest wall erythema resolved, remains tender over the clavicular insertion of the left sternocleidomastoid muscle.  No drainage.  Temporary dialysis catheter in the right internal jugular vein.     Laboratory:  WBC: 3700     Blood culture (8/19 peripherally): X 2-NGTD  Blood culture (8/19 TDC): X 1-NGTD  Tunnel abscess site culture (8/19): Rare MRSA     Impression:  1.  TDC associated tunnel infection  -- Patient recently had her TDC changed over guidewire on 8/14/2024.  Shortly thereafter she began to develop increasing pain, erythema and more recently gross pus draining from the tunnel site.  IR removed left internal jugular TDC and placed new right temporary internal jugular dialysis catheter on 8/20/24.  Cultures remain sterile and the wound is growing rare MRSA      Plan:  1.  Continue vancomycin with dialysis.  2.  Await final blood and tunnel site culture results.  3.  OK for placement of new tunneled dialysis catheter, apparently will be done by Vascular Surgery on 8/26    I will reassess 8/26, and will see sooner prn your call     Adama Soto MD  [facility]  [phone]

## 2024-08-23 NOTE — PRE-PROCEDURE NOTE
..Report from Sending RN:    Report From: Bethanie Jaimes RN  Recent Surgery of Procedure: No  Baseline Level of Consciousness (LOC): A&Ox4  Oxygen Use: No  Type: room air  Diabetic: No  Last BP Med Given Day of Dialysis: see MAR  Last Pain Med Given: see MAR  Lab Tests to be Obtained with Dialysis: No  Blood Transfusion to be Given During Dialysis: No  Available IV Access: Yes  Medications to be Administered During Dialysis: No  Continuous IV Infusion Running: No  Restraints on Currently or in the Last 24 Hours: No  Hand-Off Communication: pt is cleared to come to dialysis.  Dialysis Catheter Dressing: will access  Last Dressing Change: will access

## 2024-08-23 NOTE — PROGRESS NOTES
08/23/24 1842   Discharge Planning   Expected Discharge Disposition Home     Plan is for a new tunneled dialysis catheter to be placed on 8/26.  Patient receives dialysis at [facility] on M-W-F.   Will need to confirm with [facility] prior to discharge that order for [facility] with dialysis has been received .Will discharge home with no skilled needs.

## 2024-08-23 NOTE — POST-PROCEDURE NOTE
..Report to Receiving RN:    Report To: Bethanie Jaimes RN  Time Report Called: 1138  Hand-Off Communication: Pt removed 1L fluid, post /83, HR 64, pt tolerated dialysis with no issues.  Complications During Treatment: No  Ultrafiltration Treatment: No  Medications Administered During Dialysis: Yes, pt received 50mg diphenhydramine  Blood Products Administered During Dialysis: No  Labs Sent During Dialysis: No  Heparin Drip Rate Changes: No  Dialysis Catheter Dressing: clean, dry and intact  Last Dressing Change: 8/21/24    Electronic Signatures:   (Signed )   Authored:    (Signed )   Authored:     Last Updated: 11:39 AM by GERARD FRANKLIN

## 2024-08-23 NOTE — CARE PLAN
The patient's goals for the shift include      The clinical goals for the shift include pain management    Over the shift, the patient did not make progress toward the following goals. Barriers to progression include . Recommendations to address these barriers include .    Problem: Pain - Adult  Goal: Verbalizes/displays adequate comfort level or baseline comfort level  Outcome: Progressing     Problem: Safety - Adult  Goal: Free from fall injury  Outcome: Progressing     Problem: Discharge Planning  Goal: Discharge to home or other facility with appropriate resources  Outcome: Progressing     Problem: Chronic Conditions and Co-morbidities  Goal: Patient's chronic conditions and co-morbidity symptoms are monitored and maintained or improved  Outcome: Progressing     Problem: Fall/Injury  Goal: Not fall by end of shift  Outcome: Progressing  Goal: Be free from injury by end of the shift  Outcome: Progressing  Goal: Verbalize understanding of personal risk factors for fall in the hospital  Outcome: Progressing  Goal: Verbalize understanding of risk factor reduction measures to prevent injury from fall in the home  Outcome: Progressing  Goal: Use assistive devices by end of the shift  Outcome: Progressing  Goal: Pace activities to prevent fatigue by end of the shift  Outcome: Progressing     Problem: Pain  Goal: Takes deep breaths with improved pain control throughout the shift  Outcome: Progressing  Goal: Turns in bed with improved pain control throughout the shift  Outcome: Progressing  Goal: Walks with improved pain control throughout the shift  Outcome: Progressing  Goal: Performs ADL's with improved pain control throughout shift  Outcome: Progressing  Goal: Participates in PT with improved pain control throughout the shift  Outcome: Progressing  Goal: Free from opioid side effects throughout the shift  Outcome: Progressing  Goal: Free from acute confusion related to pain meds throughout the shift  Outcome:  Progressing

## 2024-08-23 NOTE — RESEARCH NOTES
Artificial Intelligence Monitoring in Nursing (AIMS Nursing) Study    Principle Investigator - Dr. Jcarlos Wright  Research Coordinator - JAIME Perez     Patient Name - Batsheva Page  Date - 8/22/2024 10:55 PM  Location - [facility]    Batsheva Page was approached by JAIME Perez to talk about participating in the AIMS Nursing Study. The patient declined to participate in the study. Study protocol was followed and patient was given study contact information.     JAIME Perez      Reason for Call:  Medication or medication refill:    Do you use a [facility]?  Name of the pharmacy and phone number for the current request:     [id number] - [place] [id number] AT [facility] Otilia Barron

## 2024-08-23 NOTE — NURSING NOTE
Upon rounding right internal jugular Mahurkar with current CHG dressing dry and intact, pigtail with brisk blood return and flushes easily, clamped and Curos cap intact. PIV removed.

## 2024-08-23 NOTE — PROGRESS NOTES
"Batsheva Page is a 48 y.o. female on day 5 of admission presenting drainage from dialysis catheter site on the left side of the chest.     Subjective   She was seen in dialysis. She was awake and alert    Objective     Physical Exam  General: awake, alert, oriented, responsive  Cardiovascular: regular, normal S1 and S2  Neck: there was right neck dialysis catheter in place.. No erythema or bleeding or warmth.    Chest: The left side of the chest at the previous dialysis catheter site showed no bleeding or drainage.   Extremities: no peripheral cyanosis, no pedal edema  Neuro: alert, oriented x 3, no focal weakness      Last Recorded Vitals  Blood pressure 144/61, pulse 64, temperature 36.1 °C (97 °F), temperature source Tympanic, resp. rate 15, height 1.676 m (5' 6\"), weight 65 kg (143 lb 4.8 oz), SpO2 100%.  Intake/Output last 3 Shifts:  I/O last 3 completed shifts:  In: 1950 (30 mL/kg) [P.O.:1000; I.V.:400 (6.2 mL/kg); Other:400; IV Piggyback:150]  Out: 1757 (27 mL/kg) [Urine:1 (0 mL/kg/hr); Other:1756]  Weight: 65 kg     Relevant Results  Lab Results   Component Value Date    WBC 3.7 (L) 08/23/2024    HGB 9.8 (L) 08/23/2024    HCT 32.5 (L) 08/23/2024    MCV 93 08/23/2024     08/23/2024     Lab Results   Component Value Date    GLUCOSE 110 (H) 08/23/2024    CALCIUM 7.9 (L) 08/23/2024     08/23/2024    K 3.7 08/23/2024    CO2 23 (L) 08/23/2024    CL 99 08/23/2024    BUN 30 (H) 08/23/2024    CREATININE 7.00 (H) 08/23/2024     Scheduled medications  atorvastatin, 40 mg, oral, Nightly  calcitriol, 0.5 mcg, oral, Daily  epoetin brandy-epbx, 6,500 Units, subcutaneous, Once per day on Monday Wednesday Friday  heparin (porcine), 5,000 Units, subcutaneous, q8h  heparin, 1,000 Units, intra-catheter, After Dialysis  heparin, 1,000 Units, intra-catheter, After Dialysis  levETIRAcetam, 750 mg, oral, Nightly  metoprolol tartrate, 25 mg, oral, BID  mirtazapine, 15 mg, oral, Nightly  pregabalin, 50 mg, oral, " BID  sodium zirconium cyclosilicate, 10 g, oral, Daily      Continuous medications       PRN medications  PRN medications: acetaminophen **OR** acetaminophen **OR** acetaminophen, benzocaine-menthol, dextromethorphan-guaifenesin, diphenhydrAMINE, guaiFENesin, heparin, HYDROmorphone, HYDROmorphone, lidocaine PF, melatonin, ondansetron ODT **OR** ondansetron, oxyCODONE, oxygen, vancomycin      Assessment/Plan   Tunneled dialysis catheter site infection, 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   Right neck dialysis catheter was placed by IR on 8/20 and the left sided dialysis catheter was removed.  Blood cultures from 8/18 are in progress  Blood culture from 8/19 is in progress  Wound culture from 8/19 was positive for rare Staphylococcus aureus  She is on IV vancomycin.  ID following.  Pain medication as needed.    End-stage renal disease  On hemodialysis per nephrology.    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    Seizure disorder  On Keppra.    Plan  Antibiotics per ID  Pain medication as needed  Dialysis per nephrology  New tunneled dialysis catheter to be placed     Jamil Steinberg MD

## 2024-08-23 NOTE — PROGRESS NOTES
Vancomycin Dosing by Pharmacy- FOLLOW-UP (HEMODIALYSIS)    Batsheva Page is a 48 y.o. year old female who Pharmacy has been consulted for vancomycin dosing for line infection. Based on the patient's indication and renal status this patient will be dosed based on a pre-HD level of 20-25 mcg/mL.     Patient is currently on hemodialysis MWF.    Current vancomycin regimen or maintenance dose:  500 mg after each dialysis session     Vancomycin pre-HD level 27.9 mcg/mL    Lab Results   Component Value Date    VANCORANDOM 32.0 (H) 08/23/2024    VANCOTROUGH 27.9 (HH) 09/26/2022       Visit Vitals  /61 (BP Location: Left leg, Patient Position: Lying)   Pulse 60   Temp 35.9 °C (96.6 °F) (Tympanic)   Resp 15        Lab Results   Component Value Date    CREATININE 7.00 (H) 08/23/2024    CREATININE 5.20 (H) 08/22/2024    CREATININE 9.30 (H) 08/21/2024    CREATININE 7.90 (H) 08/20/2024       I/O last 3 completed shifts:  In: 1950 (30 mL/kg) [P.O.:1000; I.V.:400 (6.2 mL/kg); Other:400; IV Piggyback:150]  Out: 1757 (27 mL/kg) [Urine:1 (0 mL/kg/hr); Other:1756]  Weight: 65 kg     Assessment/Plan     Level is above target trough goal  Do not administer after dialysis today. Will re-draw level prior to next dialysis  Next pre-HD level will be obtained on 8/26 at 0500. May be obtained sooner if clinically indicated.    Will continue to monitor renal function daily while on vancomycin and order serum creatinine at least every 48 hours if not already ordered.  Follow for continued vancomycin needs, clinical response, and signs/symptoms of toxicity.     Amy Castro, PharmD

## 2024-08-23 NOTE — PROGRESS NOTES
"Batsheva Page is a 48 y.o. female on day 5 of admission presenting with Abscess.    Subjective    patient was admitted to hospital with infected dialysis catheter which was removed patient is seen and examined on dialysis therapy she is tolerating procedure very well no fever or chills    Objective     Physical Exam  Neck:      Vascular: No carotid bruit.   Cardiovascular:      Rate and Rhythm: Normal rate and regular rhythm.      Heart sounds: No murmur heard.     No friction rub. No gallop.   Pulmonary:      Breath sounds: No wheezing, rhonchi or rales.   Chest:      Chest wall: No tenderness.   Abdominal:      General: There is no distension.      Tenderness: There is no abdominal tenderness. There is no guarding or rebound.   Musculoskeletal:         General: No swelling or tenderness.      Cervical back: Neck supple.      Right lower leg: No edema.      Left lower leg: No edema.   Lymphadenopathy:      Cervical: No cervical adenopathy.         Last Recorded Vitals  Blood pressure 144/61, pulse 60, temperature 35.9 °C (96.6 °F), temperature source Tympanic, resp. rate 15, height 1.676 m (5' 6\"), weight 65 kg (143 lb 4.8 oz), SpO2 96%.    Intake/Output last 3 Shifts:  I/O last 3 completed shifts:  In: 1950 (30 mL/kg) [P.O.:1000; I.V.:400 (6.2 mL/kg); Other:400; IV Piggyback:150]  Out: 1757 (27 mL/kg) [Urine:1 (0 mL/kg/hr); Other:1756]  Weight: 65 kg     Current Facility-Administered Medications:     acetaminophen (Tylenol) tablet 650 mg, 650 mg, oral, q4h PRN **OR** acetaminophen (Tylenol) oral liquid 650 mg, 650 mg, nasogastric tube, q4h PRN **OR** acetaminophen (Tylenol) suppository 650 mg, 650 mg, rectal, q4h PRN, Everett Lopez DO    atorvastatin (Lipitor) tablet 40 mg, 40 mg, oral, Nightly, Everett Lopez DO, 40 mg at 08/22/24 2148    benzocaine-menthol (Cepastat Sore Throat) lozenge 1 lozenge, 1 lozenge, Mouth/Throat, q2h PRN, Everett R Scharlotte, DO    calcitriol (Rocaltrol) capsule 0.5 " mcg, 0.5 mcg, oral, Daily, Everett Lopez DO, 0.5 mcg at 08/22/24 0938    dextromethorphan-guaifenesin (Robitussin DM)  mg/5 mL oral liquid 5 mL, 5 mL, oral, q4h PRN, Everett Manleyte, DO    diphenhydrAMINE (BENADryl) injection 25 mg, 25 mg, intravenous, q6h PRN, Jamil Steinberg MD, 25 mg at 08/22/24 2358    epoetin brandy-epbx (RETACRIT) injection 6,500 Units, 6,500 Units, subcutaneous, Once per day on Monday Wednesday Friday, Everett Lopez DO, 6,500 Units at 08/21/24 1002    guaiFENesin (Mucinex) 12 hr tablet 600 mg, 600 mg, oral, q12h PRN, Everett Lopez DO    heparin (porcine) injection 5,000 Units, 5,000 Units, subcutaneous, q8h, Everett Lopez DO    heparin 1,000 unit/mL injection 1,000 Units, 1,000 Units, intra-catheter, After Dialysis, Zhou Pedersen MD, 1,200 Units at 08/21/24 1923    heparin 1,000 unit/mL injection 1,000 Units, 1,000 Units, intra-catheter, After Dialysis, Zhou Pedersen MD, 1,200 Units at 08/21/24 1922    heparin 1,000 unit/mL injection, , , PRN, Melvin Medina MD, 2,000 Units at 08/20/24 1217    HYDROmorphone (Dilaudid) injection 0.2 mg, 0.2 mg, intravenous, q3h PRN, Everett Lopez DO, 0.2 mg at 08/20/24 0956    HYDROmorphone (Dilaudid) injection 0.4 mg, 0.4 mg, intravenous, q3h PRN, Everett Lopez DO, 0.4 mg at 08/23/24 0234    levETIRAcetam (Keppra) tablet 750 mg, 750 mg, oral, Nightly, Everett Lopez DO, 750 mg at 08/22/24 2100    lidocaine PF (Xylocaine) 10 mg/mL (1 %) injection, , , PRN, Melvin Medina MD, 10 mL at 08/20/24 1151    melatonin tablet 5 mg, 5 mg, oral, Nightly PRN, Everett Lopez DO    metoprolol tartrate (Lopressor) tablet 25 mg, 25 mg, oral, BID, Everett Lopez DO, 25 mg at 08/22/24 2147    mirtazapine (Remeron) tablet 15 mg, 15 mg, oral, Nightly, Everett Lopez DO, 15 mg at 08/22/24 2147    ondansetron ODT (Zofran-ODT) disintegrating tablet 4 mg, 4 mg, oral, q8h PRN **OR** ondansetron (Zofran)  injection 4 mg, 4 mg, intravenous, q8h PRN, Everett Lopez DO    oxyCODONE (Roxicodone) immediate release tablet 5 mg, 5 mg, oral, q4h PRN, Jamil Steinberg MD    oxygen (O2) therapy, , inhalation, Continuous PRN - O2/gases, Everett Lopez DO    pregabalin (Lyrica) capsule 50 mg, 50 mg, oral, BID, Everett Lopez DO, 50 mg at 08/22/24 2145    sodium zirconium cyclosilicate (Lokelma) packet 10 g, 10 g, oral, Daily, Zhou Pedersen MD, 10 g at 08/22/24 0939    vancomycin (Vancocin) pharmacy to dose - pharmacy monitoring, , miscellaneous, Daily PRN, Everett Lopez DO   Relevant Results    Results for orders placed or performed during the hospital encounter of 08/18/24 (from the past 96 hour(s))   Tissue/Wound Culture/Smear    Specimen: Other (specify in comments); Tissue/Biopsy   Result Value Ref Range    Tissue/Wound Culture/Smear (2+) Few Mixed Aerobic and Anaerobic Bacteria     Beta Lactamase (Cefinase) Positive     Tissue/Wound Culture/Smear (A)      (1+) Rare Methicillin Resistant Staphylococcus aureus (MRSA)    Gram Stain No polymorphonuclear leukocytes seen     Gram Stain No organisms seen        Susceptibility    Methicillin Resistant Staphylococcus aureus (MRSA) - MICROSCAN     Clindamycin  Susceptible ug/mL     Erythromycin  Susceptible ug/mL     Oxacillin  Resistant ug/mL     Tetracycline  Susceptible ug/mL     Trimethoprim/Sulfamethoxazole  Susceptible ug/mL     Vancomycin  Susceptible ug/mL   Blood Culture    Specimen: Dialysis; Blood culture   Result Value Ref Range    Blood Culture No growth at 3 days    Renal Function Panel   Result Value Ref Range    Glucose 91 65 - 99 mg/dL    Sodium 136 133 - 145 mmol/L    Potassium 5.0 3.4 - 5.1 mmol/L    Chloride 93 (L) 97 - 107 mmol/L    Bicarbonate 22 (L) 24 - 31 mmol/L    Urea Nitrogen 40 (H) 8 - 25 mg/dL    Creatinine 7.90 (H) 0.40 - 1.60 mg/dL    eGFR 6 (L) >60 mL/min/1.73m*2    Calcium 8.5 8.5 - 10.4 mg/dL    Phosphorus 5.3 (H) 2.5 -  4.5 mg/dL    Albumin 3.3 (L) 3.5 - 5.0 g/dL    Anion Gap >19 (H) <=19 mmol/L   CBC and Auto Differential   Result Value Ref Range    WBC 5.5 4.4 - 11.3 x10*3/uL    nRBC 0.0 0.0 - 0.0 /100 WBCs    RBC 3.91 (L) 4.00 - 5.20 x10*6/uL    Hemoglobin 10.9 (L) 12.0 - 16.0 g/dL    Hematocrit 34.8 (L) 36.0 - 46.0 %    MCV 89 80 - 100 fL    MCH 27.9 26.0 - 34.0 pg    MCHC 31.3 (L) 32.0 - 36.0 g/dL    RDW 19.9 (H) 11.5 - 14.5 %    Platelets 159 150 - 450 x10*3/uL    Neutrophils % 64.5 40.0 - 80.0 %    Immature Granulocytes %, Automated 0.4 0.0 - 0.9 %    Lymphocytes % 17.1 13.0 - 44.0 %    Monocytes % 11.2 2.0 - 10.0 %    Eosinophils % 6.1 0.0 - 6.0 %    Basophils % 0.7 0.0 - 2.0 %    Neutrophils Absolute 3.52 1.20 - 7.70 x10*3/uL    Immature Granulocytes Absolute, Automated 0.02 0.00 - 0.70 x10*3/uL    Lymphocytes Absolute 0.93 (L) 1.20 - 4.80 x10*3/uL    Monocytes Absolute 0.61 0.10 - 1.00 x10*3/uL    Eosinophils Absolute 0.33 0.00 - 0.70 x10*3/uL    Basophils Absolute 0.04 0.00 - 0.10 x10*3/uL   Renal Function Panel   Result Value Ref Range    Glucose 132 (H) 65 - 99 mg/dL    Sodium 137 133 - 145 mmol/L    Potassium 4.1 3.4 - 5.1 mmol/L    Chloride 95 (L) 97 - 107 mmol/L    Bicarbonate 22 (L) 24 - 31 mmol/L    Urea Nitrogen 48 (H) 8 - 25 mg/dL    Creatinine 9.30 (H) 0.40 - 1.60 mg/dL    eGFR 5 (L) >60 mL/min/1.73m*2    Calcium 7.8 (L) 8.5 - 10.4 mg/dL    Phosphorus 6.2 (H) 2.5 - 4.5 mg/dL    Albumin 3.3 (L) 3.5 - 5.0 g/dL    Anion Gap >19 (H) <=19 mmol/L   CBC and Auto Differential   Result Value Ref Range    WBC 4.6 4.4 - 11.3 x10*3/uL    nRBC 0.0 0.0 - 0.0 /100 WBCs    RBC 3.49 (L) 4.00 - 5.20 x10*6/uL    Hemoglobin 9.6 (L) 12.0 - 16.0 g/dL    Hematocrit 32.2 (L) 36.0 - 46.0 %    MCV 92 80 - 100 fL    MCH 27.5 26.0 - 34.0 pg    MCHC 29.8 (L) 32.0 - 36.0 g/dL    RDW 19.8 (H) 11.5 - 14.5 %    Platelets 165 150 - 450 x10*3/uL    Neutrophils % 63.9 40.0 - 80.0 %    Immature Granulocytes %, Automated 0.4 0.0 - 0.9 %     Lymphocytes % 20.0 13.0 - 44.0 %    Monocytes % 7.5 2.0 - 10.0 %    Eosinophils % 7.8 0.0 - 6.0 %    Basophils % 0.4 0.0 - 2.0 %    Neutrophils Absolute 2.96 1.20 - 7.70 x10*3/uL    Immature Granulocytes Absolute, Automated 0.02 0.00 - 0.70 x10*3/uL    Lymphocytes Absolute 0.93 (L) 1.20 - 4.80 x10*3/uL    Monocytes Absolute 0.35 0.10 - 1.00 x10*3/uL    Eosinophils Absolute 0.36 0.00 - 0.70 x10*3/uL    Basophils Absolute 0.02 0.00 - 0.10 x10*3/uL   Vancomycin   Result Value Ref Range    Vancomycin 47.5 (H) 10.0 - 20.0 ug/mL   Renal Function Panel   Result Value Ref Range    Glucose 121 (H) 65 - 99 mg/dL    Sodium 138 133 - 145 mmol/L    Potassium 3.6 3.4 - 5.1 mmol/L    Chloride 98 97 - 107 mmol/L    Bicarbonate 25 24 - 31 mmol/L    Urea Nitrogen 20 8 - 25 mg/dL    Creatinine 5.20 (H) 0.40 - 1.60 mg/dL    eGFR 10 (L) >60 mL/min/1.73m*2    Calcium 7.8 (L) 8.5 - 10.4 mg/dL    Phosphorus 4.4 2.5 - 4.5 mg/dL    Albumin 3.1 (L) 3.5 - 5.0 g/dL    Anion Gap 15 <=19 mmol/L   CBC and Auto Differential   Result Value Ref Range    WBC 3.5 (L) 4.4 - 11.3 x10*3/uL    nRBC 0.0 0.0 - 0.0 /100 WBCs    RBC 3.45 (L) 4.00 - 5.20 x10*6/uL    Hemoglobin 9.5 (L) 12.0 - 16.0 g/dL    Hematocrit 31.9 (L) 36.0 - 46.0 %    MCV 93 80 - 100 fL    MCH 27.5 26.0 - 34.0 pg    MCHC 29.8 (L) 32.0 - 36.0 g/dL    RDW 19.5 (H) 11.5 - 14.5 %    Platelets 168 150 - 450 x10*3/uL    Neutrophils % 56.3 40.0 - 80.0 %    Immature Granulocytes %, Automated 0.3 0.0 - 0.9 %    Lymphocytes % 25.6 13.0 - 44.0 %    Monocytes % 6.6 2.0 - 10.0 %    Eosinophils % 10.6 0.0 - 6.0 %    Basophils % 0.6 0.0 - 2.0 %    Neutrophils Absolute 1.96 1.20 - 7.70 x10*3/uL    Immature Granulocytes Absolute, Automated 0.01 0.00 - 0.70 x10*3/uL    Lymphocytes Absolute 0.89 (L) 1.20 - 4.80 x10*3/uL    Monocytes Absolute 0.23 0.10 - 1.00 x10*3/uL    Eosinophils Absolute 0.37 0.00 - 0.70 x10*3/uL    Basophils Absolute 0.02 0.00 - 0.10 x10*3/uL   Renal Function Panel   Result Value Ref  Range    Glucose 110 (H) 65 - 99 mg/dL    Sodium 138 133 - 145 mmol/L    Potassium 3.7 3.4 - 5.1 mmol/L    Chloride 99 97 - 107 mmol/L    Bicarbonate 23 (L) 24 - 31 mmol/L    Urea Nitrogen 30 (H) 8 - 25 mg/dL    Creatinine 7.00 (H) 0.40 - 1.60 mg/dL    eGFR 7 (L) >60 mL/min/1.73m*2    Calcium 7.9 (L) 8.5 - 10.4 mg/dL    Phosphorus 6.1 (H) 2.5 - 4.5 mg/dL    Albumin 3.2 (L) 3.5 - 5.0 g/dL    Anion Gap 16 <=19 mmol/L   CBC and Auto Differential   Result Value Ref Range    WBC 3.7 (L) 4.4 - 11.3 x10*3/uL    nRBC 0.0 0.0 - 0.0 /100 WBCs    RBC 3.50 (L) 4.00 - 5.20 x10*6/uL    Hemoglobin 9.8 (L) 12.0 - 16.0 g/dL    Hematocrit 32.5 (L) 36.0 - 46.0 %    MCV 93 80 - 100 fL    MCH 28.0 26.0 - 34.0 pg    MCHC 30.2 (L) 32.0 - 36.0 g/dL    RDW 19.1 (H) 11.5 - 14.5 %    Platelets 182 150 - 450 x10*3/uL    Neutrophils % 55.0 40.0 - 80.0 %    Immature Granulocytes %, Automated 0.5 0.0 - 0.9 %    Lymphocytes % 28.4 13.0 - 44.0 %    Monocytes % 7.0 2.0 - 10.0 %    Eosinophils % 8.6 0.0 - 6.0 %    Basophils % 0.5 0.0 - 2.0 %    Neutrophils Absolute 2.03 1.20 - 7.70 x10*3/uL    Immature Granulocytes Absolute, Automated 0.02 0.00 - 0.70 x10*3/uL    Lymphocytes Absolute 1.05 (L) 1.20 - 4.80 x10*3/uL    Monocytes Absolute 0.26 0.10 - 1.00 x10*3/uL    Eosinophils Absolute 0.32 0.00 - 0.70 x10*3/uL    Basophils Absolute 0.02 0.00 - 0.10 x10*3/uL   Vancomycin   Result Value Ref Range    Vancomycin 32.0 (H) 10.0 - 20.0 ug/mL       Assessment/Plan   End-stage kidney disease continue dialysis on Monday Wednesday Friday  Infected dialysis catheter with abscess formation catheter was discontinued by IR continue antibiotic therapy per infectious disease  Status post aortic valve placement  History of endocarditis  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

## 2024-08-24 LAB
ALBUMIN SERPL-MCNC: 3.3 G/DL (ref 3.5–5)
ANION GAP SERPL CALC-SCNC: 13 MMOL/L
BASOPHILS # BLD AUTO: 0.03 X10*3/UL (ref 0–0.1)
BASOPHILS NFR BLD AUTO: 0.6 %
BUN SERPL-MCNC: 24 MG/DL (ref 8–25)
CALCIUM SERPL-MCNC: 8.1 MG/DL (ref 8.5–10.4)
CHLORIDE SERPL-SCNC: 100 MMOL/L (ref 97–107)
CO2 SERPL-SCNC: 25 MMOL/L (ref 24–31)
CREAT SERPL-MCNC: 5.2 MG/DL (ref 0.4–1.6)
EGFRCR SERPLBLD CKD-EPI 2021: 10 ML/MIN/1.73M*2
EOSINOPHIL # BLD AUTO: 0.3 X10*3/UL (ref 0–0.7)
EOSINOPHIL NFR BLD AUTO: 5.9 %
ERYTHROCYTE [DISTWIDTH] IN BLOOD BY AUTOMATED COUNT: 18.9 % (ref 11.5–14.5)
GLUCOSE SERPL-MCNC: 119 MG/DL (ref 65–99)
HCT VFR BLD AUTO: 33.3 % (ref 36–46)
HGB BLD-MCNC: 9.9 G/DL (ref 12–16)
IMM GRANULOCYTES # BLD AUTO: 0.05 X10*3/UL (ref 0–0.7)
IMM GRANULOCYTES NFR BLD AUTO: 1 % (ref 0–0.9)
LYMPHOCYTES # BLD AUTO: 1.12 X10*3/UL (ref 1.2–4.8)
LYMPHOCYTES NFR BLD AUTO: 22.1 %
MCH RBC QN AUTO: 27.5 PG (ref 26–34)
MCHC RBC AUTO-ENTMCNC: 29.7 G/DL (ref 32–36)
MCV RBC AUTO: 93 FL (ref 80–100)
MONOCYTES # BLD AUTO: 0.32 X10*3/UL (ref 0.1–1)
MONOCYTES NFR BLD AUTO: 6.3 %
NEUTROPHILS # BLD AUTO: 3.24 X10*3/UL (ref 1.2–7.7)
NEUTROPHILS NFR BLD AUTO: 64.1 %
NRBC BLD-RTO: 0 /100 WBCS (ref 0–0)
PHOSPHATE SERPL-MCNC: 4.7 MG/DL (ref 2.5–4.5)
PLATELET # BLD AUTO: 202 X10*3/UL (ref 150–450)
POTASSIUM SERPL-SCNC: 3.4 MMOL/L (ref 3.4–5.1)
RBC # BLD AUTO: 3.6 X10*6/UL (ref 4–5.2)
SODIUM SERPL-SCNC: 138 MMOL/L (ref 133–145)
WBC # BLD AUTO: 5.1 X10*3/UL (ref 4.4–11.3)

## 2024-08-24 PROCEDURE — 36415 COLL VENOUS BLD VENIPUNCTURE: CPT | Performed by: INTERNAL MEDICINE

## 2024-08-24 PROCEDURE — 1200000002 HC GENERAL ROOM WITH TELEMETRY DAILY

## 2024-08-24 PROCEDURE — 80069 RENAL FUNCTION PANEL: CPT | Performed by: INTERNAL MEDICINE

## 2024-08-24 PROCEDURE — 85025 COMPLETE CBC W/AUTO DIFF WBC: CPT | Performed by: INTERNAL MEDICINE

## 2024-08-24 PROCEDURE — 2500000001 HC RX 250 WO HCPCS SELF ADMINISTERED DRUGS (ALT 637 FOR MEDICARE OP): Performed by: INTERNAL MEDICINE

## 2024-08-24 PROCEDURE — 2500000004 HC RX 250 GENERAL PHARMACY W/ HCPCS (ALT 636 FOR OP/ED): Performed by: INTERNAL MEDICINE

## 2024-08-24 PROCEDURE — 99232 SBSQ HOSP IP/OBS MODERATE 35: CPT | Performed by: INTERNAL MEDICINE

## 2024-08-24 RX ORDER — POTASSIUM CHLORIDE 1.5 G/1.58G
40 POWDER, FOR SOLUTION ORAL ONCE
Status: COMPLETED | OUTPATIENT
Start: 2024-08-24 | End: 2024-08-24

## 2024-08-24 RX ADMIN — METOPROLOL TARTRATE 25 MG: 25 TABLET, FILM COATED ORAL at 20:27

## 2024-08-24 RX ADMIN — CALCITRIOL CAPSULES 0.25 MCG 0.5 MCG: 0.25 CAPSULE ORAL at 08:09

## 2024-08-24 RX ADMIN — DIPHENHYDRAMINE HYDROCHLORIDE 25 MG: 50 INJECTION, SOLUTION INTRAMUSCULAR; INTRAVENOUS at 08:04

## 2024-08-24 RX ADMIN — ATORVASTATIN CALCIUM 40 MG: 40 TABLET, FILM COATED ORAL at 20:26

## 2024-08-24 RX ADMIN — PREGABALIN 50 MG: 25 CAPSULE ORAL at 08:09

## 2024-08-24 RX ADMIN — POTASSIUM CHLORIDE 40 MEQ: 1.5 FOR SOLUTION ORAL at 08:09

## 2024-08-24 RX ADMIN — HYDROMORPHONE HYDROCHLORIDE 0.4 MG: 1 INJECTION, SOLUTION INTRAMUSCULAR; INTRAVENOUS; SUBCUTANEOUS at 09:03

## 2024-08-24 RX ADMIN — HYDROMORPHONE HYDROCHLORIDE 0.4 MG: 1 INJECTION, SOLUTION INTRAMUSCULAR; INTRAVENOUS; SUBCUTANEOUS at 18:16

## 2024-08-24 RX ADMIN — HYDROMORPHONE HYDROCHLORIDE 0.4 MG: 1 INJECTION, SOLUTION INTRAMUSCULAR; INTRAVENOUS; SUBCUTANEOUS at 05:58

## 2024-08-24 RX ADMIN — DIPHENHYDRAMINE HYDROCHLORIDE 25 MG: 50 INJECTION, SOLUTION INTRAMUSCULAR; INTRAVENOUS at 20:23

## 2024-08-24 RX ADMIN — HYDROMORPHONE HYDROCHLORIDE 0.4 MG: 1 INJECTION, SOLUTION INTRAMUSCULAR; INTRAVENOUS; SUBCUTANEOUS at 15:16

## 2024-08-24 RX ADMIN — HYDROMORPHONE HYDROCHLORIDE 0.4 MG: 1 INJECTION, SOLUTION INTRAMUSCULAR; INTRAVENOUS; SUBCUTANEOUS at 11:55

## 2024-08-24 RX ADMIN — HYDROMORPHONE HYDROCHLORIDE 0.4 MG: 1 INJECTION, SOLUTION INTRAMUSCULAR; INTRAVENOUS; SUBCUTANEOUS at 21:34

## 2024-08-24 RX ADMIN — PREGABALIN 50 MG: 25 CAPSULE ORAL at 20:26

## 2024-08-24 RX ADMIN — DIPHENHYDRAMINE HYDROCHLORIDE 25 MG: 50 INJECTION, SOLUTION INTRAMUSCULAR; INTRAVENOUS at 14:06

## 2024-08-24 RX ADMIN — METOPROLOL TARTRATE 25 MG: 25 TABLET, FILM COATED ORAL at 08:09

## 2024-08-24 ASSESSMENT — PAIN DESCRIPTION - DESCRIPTORS
DESCRIPTORS: ACHING

## 2024-08-24 ASSESSMENT — PAIN SCALES - GENERAL
PAINLEVEL_OUTOF10: 7
PAINLEVEL_OUTOF10: 10 - WORST POSSIBLE PAIN
PAINLEVEL_OUTOF10: 0 - NO PAIN
PAINLEVEL_OUTOF10: 7
PAINLEVEL_OUTOF10: 10 - WORST POSSIBLE PAIN
PAINLEVEL_OUTOF10: 6
PAINLEVEL_OUTOF10: 10 - WORST POSSIBLE PAIN
PAINLEVEL_OUTOF10: 7
PAINLEVEL_OUTOF10: 10 - WORST POSSIBLE PAIN
PAINLEVEL_OUTOF10: 7
PAINLEVEL_OUTOF10: 7

## 2024-08-24 ASSESSMENT — COGNITIVE AND FUNCTIONAL STATUS - GENERAL
DAILY ACTIVITIY SCORE: 24
MOBILITY SCORE: 24

## 2024-08-24 ASSESSMENT — PAIN DESCRIPTION - ORIENTATION
ORIENTATION: RIGHT;LEFT

## 2024-08-24 ASSESSMENT — PAIN DESCRIPTION - LOCATION
LOCATION: SHOULDER
LOCATION: SHOULDER
LOCATION: NECK
LOCATION: SHOULDER
LOCATION: SHOULDER

## 2024-08-24 NOTE — NURSING NOTE
Assumed care of patient, patient is in bed awake and Is having pain 10/10 in neck will be giving IV dilaudid and patient has call light and possessions within reach.

## 2024-08-24 NOTE — CARE PLAN
The patient's goals for the shift include no pain  Problem: Pain - Adult  Goal: Verbalizes/displays adequate comfort level or baseline comfort level  Outcome: Progressing     Problem: Safety - Adult  Goal: Free from fall injury  Outcome: Progressing     Problem: Discharge Planning  Goal: Discharge to home or other facility with appropriate resources  Outcome: Progressing     Problem: Chronic Conditions and Co-morbidities  Goal: Patient's chronic conditions and co-morbidity symptoms are monitored and maintained or improved  Outcome: Progressing     Problem: Fall/Injury  Goal: Not fall by end of shift  Outcome: Progressing  Goal: Be free from injury by end of the shift  Outcome: Progressing  Goal: Verbalize understanding of personal risk factors for fall in the hospital  Outcome: Progressing  Goal: Verbalize understanding of risk factor reduction measures to prevent injury from fall in the home  Outcome: Progressing  Goal: Use assistive devices by end of the shift  Outcome: Progressing  Goal: Pace activities to prevent fatigue by end of the shift  Outcome: Progressing     Problem: Pain  Goal: Takes deep breaths with improved pain control throughout the shift  Outcome: Progressing  Goal: Turns in bed with improved pain control throughout the shift  Outcome: Progressing  Goal: Walks with improved pain control throughout the shift  Outcome: Progressing  Goal: Performs ADL's with improved pain control throughout shift  Outcome: Progressing  Goal: Participates in PT with improved pain control throughout the shift  Outcome: Progressing  Goal: Free from opioid side effects throughout the shift  Outcome: Progressing  Goal: Free from acute confusion related to pain meds throughout the shift  Outcome: Progressing       The clinical goals for the shift include pain management

## 2024-08-24 NOTE — CARE PLAN
The patient's goals for the shift include  pain control    The clinical goals for the shift include control pain

## 2024-08-24 NOTE — PROGRESS NOTES
"Batsheva Page is a 48 y.o. female on day 6 of admission presenting drainage from dialysis catheter site on the left side of the chest.     Subjective   She still reports some pain in the right side of the neck.  She has temporary dialysis catheter in place.  There is no swelling or redness or drainage at the insertion site.    Objective     Physical Exam  General: awake, alert, oriented, responsive  Cardiovascular: regular, normal S1 and S2  Neck: there was right neck dialysis catheter in place.. No erythema or bleeding or warmth or drainage  Chest: The left side of the chest at the previous dialysis catheter site showed no bleeding or drainage.   Extremities: no peripheral cyanosis, no pedal edema  Neuro: alert, oriented x 3, no focal weakness      Last Recorded Vitals  Blood pressure 143/86, pulse 64, temperature 36.5 °C (97.7 °F), temperature source Oral, resp. rate 17, height 1.676 m (5' 6\"), weight 65 kg (143 lb 4.8 oz), SpO2 92%.  Intake/Output last 3 Shifts:  I/O last 3 completed shifts:  In: 1000 (15.4 mL/kg) [I.V.:600 (9.2 mL/kg); Other:400]  Out: 1900 (29.2 mL/kg) [Other:1900]  Weight: 65 kg     Relevant Results  Lab Results   Component Value Date    WBC 5.1 08/24/2024    HGB 9.9 (L) 08/24/2024    HCT 33.3 (L) 08/24/2024    MCV 93 08/24/2024     08/24/2024     Lab Results   Component Value Date    GLUCOSE 119 (H) 08/24/2024    CALCIUM 8.1 (L) 08/24/2024     08/24/2024    K 3.4 08/24/2024    CO2 25 08/24/2024     08/24/2024    BUN 24 08/24/2024    CREATININE 5.20 (H) 08/24/2024     Scheduled medications  atorvastatin, 40 mg, oral, Nightly  calcitriol, 0.5 mcg, oral, Daily  epoetin brandy-epbx, 6,500 Units, subcutaneous, Once per day on Monday Wednesday Friday  heparin (porcine), 5,000 Units, subcutaneous, q8h  heparin, 1,000 Units, intra-catheter, After Dialysis  heparin, 1,000 Units, intra-catheter, After Dialysis  levETIRAcetam, 750 mg, oral, Nightly  metoprolol tartrate, 25 mg, " oral, BID  mirtazapine, 15 mg, oral, Nightly  pregabalin, 50 mg, oral, BID  [Held by provider] sodium zirconium cyclosilicate, 10 g, oral, Daily      Continuous medications       PRN medications  PRN medications: acetaminophen **OR** acetaminophen **OR** acetaminophen, benzocaine-menthol, dextromethorphan-guaifenesin, diphenhydrAMINE, guaiFENesin, heparin, HYDROmorphone, HYDROmorphone, lidocaine PF, melatonin, ondansetron ODT **OR** ondansetron, oxyCODONE, oxygen, vancomycin      Assessment/Plan   Tunneled dialysis catheter site infection, 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   Right neck dialysis catheter was placed by IR on 8/20 and the left sided dialysis catheter was removed.  Blood cultures from 8/18 are in progress  Blood culture from 8/19 is in progress  Wound culture from 8/19 was positive for rare MRSA  She is on IV vancomycin.  ID following.  Pain medication as needed.    End-stage renal disease  On hemodialysis per nephrology.    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    Seizure disorder  On Keppra.    Plan  Antibiotics per ID  Pain medication as needed  Dialysis per nephrology  New tunneled dialysis catheter to be placed Monday 8/26    Jamil Steinberg MD

## 2024-08-25 LAB
ALBUMIN SERPL-MCNC: 3.1 G/DL (ref 3.5–5)
ANION GAP SERPL CALC-SCNC: 15 MMOL/L
BASOPHILS # BLD AUTO: 0.04 X10*3/UL (ref 0–0.1)
BASOPHILS NFR BLD AUTO: 0.6 %
BUN SERPL-MCNC: 38 MG/DL (ref 8–25)
CALCIUM SERPL-MCNC: 8.1 MG/DL (ref 8.5–10.4)
CHLORIDE SERPL-SCNC: 101 MMOL/L (ref 97–107)
CO2 SERPL-SCNC: 22 MMOL/L (ref 24–31)
CREAT SERPL-MCNC: 6.6 MG/DL (ref 0.4–1.6)
EGFRCR SERPLBLD CKD-EPI 2021: 7 ML/MIN/1.73M*2
EOSINOPHIL # BLD AUTO: 0.42 X10*3/UL (ref 0–0.7)
EOSINOPHIL NFR BLD AUTO: 6.6 %
ERYTHROCYTE [DISTWIDTH] IN BLOOD BY AUTOMATED COUNT: 18.6 % (ref 11.5–14.5)
GLUCOSE SERPL-MCNC: 132 MG/DL (ref 65–99)
HCT VFR BLD AUTO: 33.1 % (ref 36–46)
HGB BLD-MCNC: 9.6 G/DL (ref 12–16)
IMM GRANULOCYTES # BLD AUTO: 0.07 X10*3/UL (ref 0–0.7)
IMM GRANULOCYTES NFR BLD AUTO: 1.1 % (ref 0–0.9)
LYMPHOCYTES # BLD AUTO: 1.32 X10*3/UL (ref 1.2–4.8)
LYMPHOCYTES NFR BLD AUTO: 20.6 %
MCH RBC QN AUTO: 27.7 PG (ref 26–34)
MCHC RBC AUTO-ENTMCNC: 29 G/DL (ref 32–36)
MCV RBC AUTO: 95 FL (ref 80–100)
MONOCYTES # BLD AUTO: 0.37 X10*3/UL (ref 0.1–1)
MONOCYTES NFR BLD AUTO: 5.8 %
NEUTROPHILS # BLD AUTO: 4.18 X10*3/UL (ref 1.2–7.7)
NEUTROPHILS NFR BLD AUTO: 65.3 %
NRBC BLD-RTO: 0 /100 WBCS (ref 0–0)
PHOSPHATE SERPL-MCNC: 5.7 MG/DL (ref 2.5–4.5)
PLATELET # BLD AUTO: 217 X10*3/UL (ref 150–450)
POTASSIUM SERPL-SCNC: 3.5 MMOL/L (ref 3.4–5.1)
RBC # BLD AUTO: 3.47 X10*6/UL (ref 4–5.2)
SODIUM SERPL-SCNC: 138 MMOL/L (ref 133–145)
WBC # BLD AUTO: 6.4 X10*3/UL (ref 4.4–11.3)

## 2024-08-25 PROCEDURE — 80069 RENAL FUNCTION PANEL: CPT | Performed by: INTERNAL MEDICINE

## 2024-08-25 PROCEDURE — 36415 COLL VENOUS BLD VENIPUNCTURE: CPT | Performed by: INTERNAL MEDICINE

## 2024-08-25 PROCEDURE — 85025 COMPLETE CBC W/AUTO DIFF WBC: CPT | Performed by: INTERNAL MEDICINE

## 2024-08-25 PROCEDURE — 2500000004 HC RX 250 GENERAL PHARMACY W/ HCPCS (ALT 636 FOR OP/ED): Performed by: INTERNAL MEDICINE

## 2024-08-25 PROCEDURE — 1200000002 HC GENERAL ROOM WITH TELEMETRY DAILY

## 2024-08-25 PROCEDURE — 2500000001 HC RX 250 WO HCPCS SELF ADMINISTERED DRUGS (ALT 637 FOR MEDICARE OP): Performed by: INTERNAL MEDICINE

## 2024-08-25 PROCEDURE — 99232 SBSQ HOSP IP/OBS MODERATE 35: CPT | Performed by: INTERNAL MEDICINE

## 2024-08-25 RX ORDER — HEPARIN SODIUM 1000 [USP'U]/ML
1000 INJECTION, SOLUTION INTRAVENOUS; SUBCUTANEOUS
OUTPATIENT
Start: 2024-08-26

## 2024-08-25 RX ADMIN — PREGABALIN 50 MG: 25 CAPSULE ORAL at 20:39

## 2024-08-25 RX ADMIN — HYDROMORPHONE HYDROCHLORIDE 0.4 MG: 1 INJECTION, SOLUTION INTRAMUSCULAR; INTRAVENOUS; SUBCUTANEOUS at 11:00

## 2024-08-25 RX ADMIN — DIPHENHYDRAMINE HYDROCHLORIDE 25 MG: 50 INJECTION, SOLUTION INTRAMUSCULAR; INTRAVENOUS at 02:30

## 2024-08-25 RX ADMIN — METOPROLOL TARTRATE 25 MG: 25 TABLET, FILM COATED ORAL at 20:47

## 2024-08-25 RX ADMIN — METOPROLOL TARTRATE 25 MG: 25 TABLET, FILM COATED ORAL at 08:01

## 2024-08-25 RX ADMIN — HYDROMORPHONE HYDROCHLORIDE 0.4 MG: 1 INJECTION, SOLUTION INTRAMUSCULAR; INTRAVENOUS; SUBCUTANEOUS at 04:50

## 2024-08-25 RX ADMIN — ATORVASTATIN CALCIUM 40 MG: 40 TABLET, FILM COATED ORAL at 20:39

## 2024-08-25 RX ADMIN — DIPHENHYDRAMINE HYDROCHLORIDE 25 MG: 50 INJECTION, SOLUTION INTRAMUSCULAR; INTRAVENOUS at 08:49

## 2024-08-25 RX ADMIN — PREGABALIN 50 MG: 25 CAPSULE ORAL at 08:01

## 2024-08-25 RX ADMIN — DIPHENHYDRAMINE HYDROCHLORIDE 25 MG: 50 INJECTION, SOLUTION INTRAMUSCULAR; INTRAVENOUS at 23:10

## 2024-08-25 RX ADMIN — CALCITRIOL CAPSULES 0.25 MCG 0.5 MCG: 0.25 CAPSULE ORAL at 08:01

## 2024-08-25 RX ADMIN — HYDROMORPHONE HYDROCHLORIDE 0.4 MG: 1 INJECTION, SOLUTION INTRAMUSCULAR; INTRAVENOUS; SUBCUTANEOUS at 08:00

## 2024-08-25 RX ADMIN — HYDROMORPHONE HYDROCHLORIDE 0.4 MG: 1 INJECTION, SOLUTION INTRAMUSCULAR; INTRAVENOUS; SUBCUTANEOUS at 00:28

## 2024-08-25 RX ADMIN — HYDROMORPHONE HYDROCHLORIDE 0.4 MG: 1 INJECTION, SOLUTION INTRAMUSCULAR; INTRAVENOUS; SUBCUTANEOUS at 14:00

## 2024-08-25 RX ADMIN — HYDROMORPHONE HYDROCHLORIDE 0.4 MG: 1 INJECTION, SOLUTION INTRAMUSCULAR; INTRAVENOUS; SUBCUTANEOUS at 20:38

## 2024-08-25 RX ADMIN — HYDROMORPHONE HYDROCHLORIDE 0.4 MG: 1 INJECTION, SOLUTION INTRAMUSCULAR; INTRAVENOUS; SUBCUTANEOUS at 17:00

## 2024-08-25 RX ADMIN — Medication 5 MG: at 20:39

## 2024-08-25 RX ADMIN — DIPHENHYDRAMINE HYDROCHLORIDE 25 MG: 50 INJECTION, SOLUTION INTRAMUSCULAR; INTRAVENOUS at 16:45

## 2024-08-25 RX ADMIN — MIRTAZAPINE 15 MG: 15 TABLET, FILM COATED ORAL at 20:39

## 2024-08-25 RX ADMIN — HYDROMORPHONE HYDROCHLORIDE 0.4 MG: 1 INJECTION, SOLUTION INTRAMUSCULAR; INTRAVENOUS; SUBCUTANEOUS at 23:10

## 2024-08-25 ASSESSMENT — PAIN - FUNCTIONAL ASSESSMENT
PAIN_FUNCTIONAL_ASSESSMENT: 0-10

## 2024-08-25 ASSESSMENT — PAIN SCALES - GENERAL
PAINLEVEL_OUTOF10: 10 - WORST POSSIBLE PAIN
PAINLEVEL_OUTOF10: 5 - MODERATE PAIN
PAINLEVEL_OUTOF10: 7
PAINLEVEL_OUTOF10: 10 - WORST POSSIBLE PAIN
PAINLEVEL_OUTOF10: 7
PAINLEVEL_OUTOF10: 10 - WORST POSSIBLE PAIN
PAINLEVEL_OUTOF10: 7
PAINLEVEL_OUTOF10: 10 - WORST POSSIBLE PAIN
PAINLEVEL_OUTOF10: 8
PAINLEVEL_OUTOF10: 10 - WORST POSSIBLE PAIN
PAINLEVEL_OUTOF10: 7
PAINLEVEL_OUTOF10: 0 - NO PAIN
PAINLEVEL_OUTOF10: 8
PAINLEVEL_OUTOF10: 10 - WORST POSSIBLE PAIN
PAINLEVEL_OUTOF10: 0 - NO PAIN

## 2024-08-25 ASSESSMENT — COGNITIVE AND FUNCTIONAL STATUS - GENERAL
DAILY ACTIVITIY SCORE: 24
MOBILITY SCORE: 24

## 2024-08-25 ASSESSMENT — PAIN DESCRIPTION - DESCRIPTORS
DESCRIPTORS: ACHING

## 2024-08-25 ASSESSMENT — PAIN DESCRIPTION - LOCATION
LOCATION: SHOULDER

## 2024-08-25 ASSESSMENT — PAIN DESCRIPTION - ORIENTATION
ORIENTATION: RIGHT;LEFT

## 2024-08-25 NOTE — CARE PLAN
The patient's goals for the shift include patient pain control under 3/10    The clinical goals for the shift include no fall or injury by the end of shift    Problem: Pain - Adult  Goal: Verbalizes/displays adequate comfort level or baseline comfort level  Outcome: Progressing     Problem: Safety - Adult  Goal: Free from fall injury  Outcome: Progressing     Problem: Fall/Injury  Goal: Not fall by end of shift  Outcome: Progressing     Problem: Fall/Injury  Goal: Verbalize understanding of personal risk factors for fall in the hospital  Outcome: Progressing

## 2024-08-25 NOTE — PROGRESS NOTES
"Batsheva Page is a 48 y.o. female on day 7 of admission presenting drainage from dialysis catheter site on the left side of the chest.     Subjective   She has no acute complaints.  There is some discomfort on the right side of the neck where she has a temporary dialysis catheter.  No associated swelling, redness, drainage or bleeding    Objective     Physical Exam  General: awake, alert, oriented, responsive  Cardiovascular: regular, normal S1 and S2  Neck: there was right neck dialysis catheter in place.. No erythema or bleeding or warmth or drainage  Chest: The left side of the chest at the previous dialysis catheter site showed no bleeding or drainage.   Extremities: no peripheral cyanosis, no pedal edema  Neuro: alert, oriented x 3, no focal weakness      Last Recorded Vitals  Blood pressure 107/77, pulse 59, temperature 36.6 °C (97.9 °F), temperature source Oral, resp. rate 16, height 1.676 m (5' 6\"), weight 65 kg (143 lb 4.8 oz), SpO2 100%.  Intake/Output last 3 Shifts:  I/O last 3 completed shifts:  In: 840 (12.9 mL/kg) [P.O.:840]  Out: 0 (0 mL/kg)   Weight: 65 kg     Relevant Results  Lab Results   Component Value Date    WBC 6.4 08/25/2024    HGB 9.6 (L) 08/25/2024    HCT 33.1 (L) 08/25/2024    MCV 95 08/25/2024     08/25/2024     Lab Results   Component Value Date    GLUCOSE 132 (H) 08/25/2024    CALCIUM 8.1 (L) 08/25/2024     08/25/2024    K 3.5 08/25/2024    CO2 22 (L) 08/25/2024     08/25/2024    BUN 38 (H) 08/25/2024    CREATININE 6.60 (H) 08/25/2024     Scheduled medications  atorvastatin, 40 mg, oral, Nightly  calcitriol, 0.5 mcg, oral, Daily  epoetin brandy-epbx, 6,500 Units, subcutaneous, Once per day on Monday Wednesday Friday  heparin (porcine), 5,000 Units, subcutaneous, q8h  heparin, 1,000 Units, intra-catheter, After Dialysis  heparin, 1,000 Units, intra-catheter, After Dialysis  levETIRAcetam, 750 mg, oral, Nightly  metoprolol tartrate, 25 mg, oral, BID  mirtazapine, " 15 mg, oral, Nightly  pregabalin, 50 mg, oral, BID  [Held by provider] sodium zirconium cyclosilicate, 10 g, oral, Daily      Continuous medications       PRN medications  PRN medications: acetaminophen **OR** acetaminophen **OR** acetaminophen, benzocaine-menthol, dextromethorphan-guaifenesin, diphenhydrAMINE, guaiFENesin, heparin, HYDROmorphone, HYDROmorphone, lidocaine PF, melatonin, ondansetron ODT **OR** ondansetron, oxyCODONE, oxygen, vancomycin      Assessment/Plan   Tunneled dialysis catheter site infection, 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   Right neck dialysis catheter was placed by IR on 8/20 and the left sided dialysis catheter was removed.  Blood cultures from 8/18 are in progress  Blood culture from 8/19 is in progress  Wound culture from 8/19 was positive for rare MRSA  She is on IV vancomycin.  ID following.  Pain medication as needed.    End-stage renal disease  On hemodialysis per nephrology.    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    Seizure disorder  On Keppra.    Plan  Antibiotics per ID  Pain medication as needed  Dialysis per nephrology  New tunneled dialysis catheter to be placed Monday 8/26    Jamil Steinberg MD

## 2024-08-26 ENCOUNTER — ANESTHESIA (OUTPATIENT)
Dept: CARDIOLOGY | Facility: HOSPITAL | Age: 49
DRG: 314 | End: 2024-08-26
Payer: MEDICARE

## 2024-08-26 ENCOUNTER — ANESTHESIA EVENT (OUTPATIENT)
Dept: CARDIOLOGY | Facility: HOSPITAL | Age: 49
DRG: 314 | End: 2024-08-26
Payer: MEDICARE

## 2024-08-26 ENCOUNTER — APPOINTMENT (OUTPATIENT)
Dept: CARDIOLOGY | Facility: HOSPITAL | Age: 49
DRG: 314 | End: 2024-08-26
Payer: MEDICARE

## 2024-08-26 ENCOUNTER — APPOINTMENT (OUTPATIENT)
Dept: DIALYSIS | Facility: HOSPITAL | Age: 49
End: 2024-08-26
Payer: MEDICARE

## 2024-08-26 PROBLEM — Z86.79 HISTORY OF ENDOCARDITIS: Status: ACTIVE | Noted: 2024-08-26

## 2024-08-26 LAB
ANION GAP SERPL CALC-SCNC: 15 MMOL/L
BUN SERPL-MCNC: 50 MG/DL (ref 8–25)
CALCIUM SERPL-MCNC: 8.2 MG/DL (ref 8.5–10.4)
CHLORIDE SERPL-SCNC: 100 MMOL/L (ref 97–107)
CO2 SERPL-SCNC: 21 MMOL/L (ref 24–31)
CREAT SERPL-MCNC: 8.2 MG/DL (ref 0.4–1.6)
EGFRCR SERPLBLD CKD-EPI 2021: 6 ML/MIN/1.73M*2
ERYTHROCYTE [DISTWIDTH] IN BLOOD BY AUTOMATED COUNT: 18.6 % (ref 11.5–14.5)
GLUCOSE BLD MANUAL STRIP-MCNC: 103 MG/DL (ref 74–99)
GLUCOSE SERPL-MCNC: 111 MG/DL (ref 65–99)
HCT VFR BLD AUTO: 34 % (ref 36–46)
HGB BLD-MCNC: 10.2 G/DL (ref 12–16)
MCH RBC QN AUTO: 27.6 PG (ref 26–34)
MCHC RBC AUTO-ENTMCNC: 30 G/DL (ref 32–36)
MCV RBC AUTO: 92 FL (ref 80–100)
NRBC BLD-RTO: 0 /100 WBCS (ref 0–0)
PLATELET # BLD AUTO: 235 X10*3/UL (ref 150–450)
POTASSIUM SERPL-SCNC: 3.9 MMOL/L (ref 3.4–5.1)
RBC # BLD AUTO: 3.7 X10*6/UL (ref 4–5.2)
SODIUM SERPL-SCNC: 136 MMOL/L (ref 133–145)
VANCOMYCIN SERPL-MCNC: 21.9 UG/ML (ref 10–20)
WBC # BLD AUTO: 9.2 X10*3/UL (ref 4.4–11.3)

## 2024-08-26 PROCEDURE — 82374 ASSAY BLOOD CARBON DIOXIDE: CPT | Performed by: INTERNAL MEDICINE

## 2024-08-26 PROCEDURE — 7100000001 HC RECOVERY ROOM TIME - INITIAL BASE CHARGE

## 2024-08-26 PROCEDURE — 3700000002 HC GENERAL ANESTHESIA TIME - EACH INCREMENTAL 1 MINUTE

## 2024-08-26 PROCEDURE — 36558 INSERT TUNNELED CV CATH: CPT

## 2024-08-26 PROCEDURE — 6350000001 HC RX 635 EPOETIN >10,000 UNITS: Performed by: INTERNAL MEDICINE

## 2024-08-26 PROCEDURE — 85027 COMPLETE CBC AUTOMATED: CPT | Performed by: INTERNAL MEDICINE

## 2024-08-26 PROCEDURE — 2500000001 HC RX 250 WO HCPCS SELF ADMINISTERED DRUGS (ALT 637 FOR MEDICARE OP): Performed by: INTERNAL MEDICINE

## 2024-08-26 PROCEDURE — 7100000002 HC RECOVERY ROOM TIME - EACH INCREMENTAL 1 MINUTE

## 2024-08-26 PROCEDURE — 2500000004 HC RX 250 GENERAL PHARMACY W/ HCPCS (ALT 636 FOR OP/ED): Performed by: NURSE PRACTITIONER

## 2024-08-26 PROCEDURE — 93010 ELECTROCARDIOGRAM REPORT: CPT | Performed by: INTERNAL MEDICINE

## 2024-08-26 PROCEDURE — 76937 US GUIDE VASCULAR ACCESS: CPT

## 2024-08-26 PROCEDURE — A36558 PR INSERT TUNNELED CV CATH W/O PORT OR PUMP: Performed by: ANESTHESIOLOGIST ASSISTANT

## 2024-08-26 PROCEDURE — 1200000002 HC GENERAL ROOM WITH TELEMETRY DAILY

## 2024-08-26 PROCEDURE — 2500000004 HC RX 250 GENERAL PHARMACY W/ HCPCS (ALT 636 FOR OP/ED): Performed by: ANESTHESIOLOGIST ASSISTANT

## 2024-08-26 PROCEDURE — 2720000007 HC OR 272 NO HCPCS

## 2024-08-26 PROCEDURE — A36558 PR INSERT TUNNELED CV CATH W/O PORT OR PUMP: Performed by: ANESTHESIOLOGY

## 2024-08-26 PROCEDURE — 77001 FLUOROGUIDE FOR VEIN DEVICE: CPT | Performed by: STUDENT IN AN ORGANIZED HEALTH CARE EDUCATION/TRAINING PROGRAM

## 2024-08-26 PROCEDURE — 2500000004 HC RX 250 GENERAL PHARMACY W/ HCPCS (ALT 636 FOR OP/ED): Performed by: INTERNAL MEDICINE

## 2024-08-26 PROCEDURE — 36558 INSERT TUNNELED CV CATH: CPT | Performed by: STUDENT IN AN ORGANIZED HEALTH CARE EDUCATION/TRAINING PROGRAM

## 2024-08-26 PROCEDURE — 76937 US GUIDE VASCULAR ACCESS: CPT | Performed by: STUDENT IN AN ORGANIZED HEALTH CARE EDUCATION/TRAINING PROGRAM

## 2024-08-26 PROCEDURE — 99232 SBSQ HOSP IP/OBS MODERATE 35: CPT | Performed by: INTERNAL MEDICINE

## 2024-08-26 PROCEDURE — 2500000005 HC RX 250 GENERAL PHARMACY W/O HCPCS: Performed by: ANESTHESIOLOGIST ASSISTANT

## 2024-08-26 PROCEDURE — 2500000005 HC RX 250 GENERAL PHARMACY W/O HCPCS: Performed by: STUDENT IN AN ORGANIZED HEALTH CARE EDUCATION/TRAINING PROGRAM

## 2024-08-26 PROCEDURE — 82947 ASSAY GLUCOSE BLOOD QUANT: CPT

## 2024-08-26 PROCEDURE — C1769 GUIDE WIRE: HCPCS

## 2024-08-26 PROCEDURE — 77001 FLUOROGUIDE FOR VEIN DEVICE: CPT

## 2024-08-26 PROCEDURE — 3700000001 HC GENERAL ANESTHESIA TIME - INITIAL BASE CHARGE

## 2024-08-26 PROCEDURE — 36415 COLL VENOUS BLD VENIPUNCTURE: CPT | Performed by: INTERNAL MEDICINE

## 2024-08-26 PROCEDURE — 80202 ASSAY OF VANCOMYCIN: CPT | Performed by: INTERNAL MEDICINE

## 2024-08-26 RX ORDER — FENTANYL CITRATE 50 UG/ML
50 INJECTION, SOLUTION INTRAMUSCULAR; INTRAVENOUS EVERY 5 MIN PRN
Status: DISCONTINUED | OUTPATIENT
Start: 2024-08-26 | End: 2024-08-27

## 2024-08-26 RX ORDER — ALBUTEROL SULFATE 0.83 MG/ML
2.5 SOLUTION RESPIRATORY (INHALATION) ONCE AS NEEDED
Status: DISCONTINUED | OUTPATIENT
Start: 2024-08-26 | End: 2024-08-27

## 2024-08-26 RX ORDER — MIDAZOLAM HYDROCHLORIDE 1 MG/ML
INJECTION, SOLUTION INTRAMUSCULAR; INTRAVENOUS AS NEEDED
Status: DISCONTINUED | OUTPATIENT
Start: 2024-08-26 | End: 2024-08-26

## 2024-08-26 RX ORDER — SODIUM CHLORIDE, SODIUM LACTATE, POTASSIUM CHLORIDE, CALCIUM CHLORIDE 600; 310; 30; 20 MG/100ML; MG/100ML; MG/100ML; MG/100ML
100 INJECTION, SOLUTION INTRAVENOUS CONTINUOUS
Status: DISCONTINUED | OUTPATIENT
Start: 2024-08-26 | End: 2024-08-27

## 2024-08-26 RX ORDER — IPRATROPIUM BROMIDE 0.5 MG/2.5ML
500 SOLUTION RESPIRATORY (INHALATION) ONCE AS NEEDED
Status: DISCONTINUED | OUTPATIENT
Start: 2024-08-26 | End: 2024-08-27

## 2024-08-26 RX ORDER — MEPERIDINE HYDROCHLORIDE 25 MG/ML
12.5 INJECTION INTRAMUSCULAR; INTRAVENOUS; SUBCUTANEOUS EVERY 10 MIN PRN
Status: DISCONTINUED | OUTPATIENT
Start: 2024-08-26 | End: 2024-08-27

## 2024-08-26 RX ORDER — DEXTROSE MONOHYDRATE AND SODIUM CHLORIDE 5; .45 G/100ML; G/100ML
25 INJECTION, SOLUTION INTRAVENOUS CONTINUOUS
Status: DISCONTINUED | OUTPATIENT
Start: 2024-08-26 | End: 2024-08-27

## 2024-08-26 RX ORDER — DIPHENHYDRAMINE HCL 25 MG
25 TABLET ORAL EVERY 6 HOURS PRN
Status: DISCONTINUED | OUTPATIENT
Start: 2024-08-26 | End: 2024-08-27

## 2024-08-26 RX ORDER — HYDROMORPHONE HYDROCHLORIDE 2 MG/1
4 TABLET ORAL EVERY 4 HOURS PRN
Status: DISCONTINUED | OUTPATIENT
Start: 2024-08-26 | End: 2024-08-27

## 2024-08-26 RX ORDER — HYDROMORPHONE HYDROCHLORIDE 2 MG/1
2 TABLET ORAL EVERY 4 HOURS PRN
Status: DISCONTINUED | OUTPATIENT
Start: 2024-08-26 | End: 2024-08-26

## 2024-08-26 RX ORDER — PROPOFOL 10 MG/ML
INJECTION, EMULSION INTRAVENOUS CONTINUOUS PRN
Status: DISCONTINUED | OUTPATIENT
Start: 2024-08-26 | End: 2024-08-26

## 2024-08-26 RX ORDER — ONDANSETRON HYDROCHLORIDE 2 MG/ML
4 INJECTION, SOLUTION INTRAVENOUS ONCE AS NEEDED
Status: DISCONTINUED | OUTPATIENT
Start: 2024-08-26 | End: 2024-08-27

## 2024-08-26 RX ADMIN — HYDROMORPHONE HYDROCHLORIDE 0.4 MG: 1 INJECTION, SOLUTION INTRAMUSCULAR; INTRAVENOUS; SUBCUTANEOUS at 09:57

## 2024-08-26 RX ADMIN — PREGABALIN 50 MG: 25 CAPSULE ORAL at 09:49

## 2024-08-26 RX ADMIN — METOPROLOL TARTRATE 25 MG: 25 TABLET, FILM COATED ORAL at 09:49

## 2024-08-26 RX ADMIN — HYDROMORPHONE HYDROCHLORIDE 0.4 MG: 1 INJECTION, SOLUTION INTRAMUSCULAR; INTRAVENOUS; SUBCUTANEOUS at 03:19

## 2024-08-26 RX ADMIN — DIPHENHYDRAMINE HYDROCHLORIDE 25 MG: 50 INJECTION, SOLUTION INTRAMUSCULAR; INTRAVENOUS at 05:04

## 2024-08-26 RX ADMIN — HYDROMORPHONE HYDROCHLORIDE 4 MG: 2 TABLET ORAL at 22:43

## 2024-08-26 RX ADMIN — LIDOCAINE HYDROCHLORIDE 5 ML: 10 INJECTION, SOLUTION EPIDURAL; INFILTRATION; INTRACAUDAL; PERINEURAL at 14:24

## 2024-08-26 RX ADMIN — EPOETIN ALFA-EPBX 6500 UNITS: 20000 INJECTION, SOLUTION INTRAVENOUS; SUBCUTANEOUS at 09:00

## 2024-08-26 RX ADMIN — DIPHENHYDRAMINE HYDROCHLORIDE 25 MG: 50 INJECTION, SOLUTION INTRAMUSCULAR; INTRAVENOUS at 11:51

## 2024-08-26 RX ADMIN — HYDROMORPHONE HYDROCHLORIDE 0.4 MG: 1 INJECTION, SOLUTION INTRAMUSCULAR; INTRAVENOUS; SUBCUTANEOUS at 12:38

## 2024-08-26 RX ADMIN — CALCITRIOL CAPSULES 0.25 MCG 0.5 MCG: 0.25 CAPSULE ORAL at 09:49

## 2024-08-26 RX ADMIN — VANCOMYCIN HYDROCHLORIDE 500 MG: 500 INJECTION, POWDER, LYOPHILIZED, FOR SOLUTION INTRAVENOUS at 16:11

## 2024-08-26 RX ADMIN — HYDROMORPHONE HYDROCHLORIDE 0.4 MG: 1 INJECTION, SOLUTION INTRAMUSCULAR; INTRAVENOUS; SUBCUTANEOUS at 06:26

## 2024-08-26 SDOH — HEALTH STABILITY: MENTAL HEALTH: CURRENT SMOKER: 0

## 2024-08-26 ASSESSMENT — PAIN SCALES - GENERAL
PAINLEVEL_OUTOF10: 4
PAINLEVEL_OUTOF10: 10 - WORST POSSIBLE PAIN
PAINLEVEL_OUTOF10: 0 - NO PAIN
PAINLEVEL_OUTOF10: 0 - NO PAIN
PAINLEVEL_OUTOF10: 10 - WORST POSSIBLE PAIN
PAINLEVEL_OUTOF10: 7
PAINLEVEL_OUTOF10: 10 - WORST POSSIBLE PAIN
PAIN_LEVEL: 0
PAINLEVEL_OUTOF10: 10 - WORST POSSIBLE PAIN
PAINLEVEL_OUTOF10: 10 - WORST POSSIBLE PAIN

## 2024-08-26 ASSESSMENT — COGNITIVE AND FUNCTIONAL STATUS - GENERAL
MOBILITY SCORE: 23
CLIMB 3 TO 5 STEPS WITH RAILING: A LITTLE
MOBILITY SCORE: 24
DAILY ACTIVITIY SCORE: 24
DAILY ACTIVITIY SCORE: 24

## 2024-08-26 ASSESSMENT — PAIN - FUNCTIONAL ASSESSMENT
PAIN_FUNCTIONAL_ASSESSMENT: CPOT (CRITICAL CARE PAIN OBSERVATION TOOL)
PAIN_FUNCTIONAL_ASSESSMENT: 0-10
PAIN_FUNCTIONAL_ASSESSMENT: 0-10
PAIN_FUNCTIONAL_ASSESSMENT: CPOT (CRITICAL CARE PAIN OBSERVATION TOOL)
PAIN_FUNCTIONAL_ASSESSMENT: 0-10
PAIN_FUNCTIONAL_ASSESSMENT: 0-10
PAIN_FUNCTIONAL_ASSESSMENT: WONG-BAKER FACES
PAIN_FUNCTIONAL_ASSESSMENT: 0-10
PAIN_FUNCTIONAL_ASSESSMENT: WONG-BAKER FACES
PAIN_FUNCTIONAL_ASSESSMENT: 0-10
PAIN_FUNCTIONAL_ASSESSMENT: CPOT (CRITICAL CARE PAIN OBSERVATION TOOL)
PAIN_FUNCTIONAL_ASSESSMENT: 0-10
PAIN_FUNCTIONAL_ASSESSMENT: CPOT (CRITICAL CARE PAIN OBSERVATION TOOL)

## 2024-08-26 ASSESSMENT — ACTIVITIES OF DAILY LIVING (ADL)
EFFECT OF PAIN ON DAILY ACTIVITIES: YES

## 2024-08-26 ASSESSMENT — COLUMBIA-SUICIDE SEVERITY RATING SCALE - C-SSRS
2. HAVE YOU ACTUALLY HAD ANY THOUGHTS OF KILLING YOURSELF?: NO
6. HAVE YOU EVER DONE ANYTHING, STARTED TO DO ANYTHING, OR PREPARED TO DO ANYTHING TO END YOUR LIFE?: NO
1. IN THE PAST MONTH, HAVE YOU WISHED YOU WERE DEAD OR WISHED YOU COULD GO TO SLEEP AND NOT WAKE UP?: NO

## 2024-08-26 ASSESSMENT — PAIN DESCRIPTION - LOCATION
LOCATION: CHEST
LOCATION: NECK
LOCATION: NECK

## 2024-08-26 ASSESSMENT — PAIN DESCRIPTION - DESCRIPTORS
DESCRIPTORS: ACHING
DESCRIPTORS: STABBING

## 2024-08-26 ASSESSMENT — PAIN DESCRIPTION - ORIENTATION
ORIENTATION: RIGHT;LEFT
ORIENTATION: RIGHT;LEFT

## 2024-08-26 ASSESSMENT — PAIN SCALES - WONG BAKER
WONGBAKER_NUMERICALRESPONSE: NO HURT
WONGBAKER_NUMERICALRESPONSE: NO HURT

## 2024-08-26 NOTE — NURSING NOTE
:I attempted to talk to pt about pain meds and she said she is leaving bcz they changed her IV meds to PO and removed IV access.

## 2024-08-26 NOTE — NURSING NOTE
Patient with Rt internal jugular dialysis catheter, dressing D&I, pigtail flushes easily and with positive blood return, clamped and curos cap applied.

## 2024-08-26 NOTE — CARE PLAN
The patient's goals for the shift include      The clinical goals for the shift include have adequate pain control    Over the shift, the patient did not make progress toward the following goals. Barriers to progression include   Problem: Pain - Adult  Goal: Verbalizes/displays adequate comfort level or baseline comfort level  Outcome: Progressing     Problem: Chronic Conditions and Co-morbidities  Goal: Patient's chronic conditions and co-morbidity symptoms are monitored and maintained or improved  Outcome: Progressing   . Recommendations to address these barriers include .

## 2024-08-26 NOTE — PRE-PROCEDURE NOTE
..Report from Sending RN:    Report From: Erlinda Jara RN  Recent Surgery of Procedure: Yes, tunnel catheter placed 8/26/24  Baseline Level of Consciousness (LOC): A7Ox4  Oxygen Use: No  Type: room air  Diabetic: No  Last BP Med Given Day of Dialysis: see MAR  Last Pain Med Given: see MAR  Lab Tests to be Obtained with Dialysis: No  Blood Transfusion to be Given During Dialysis: No  Available IV Access: Yes  Medications to be Administered During Dialysis: No  Continuous IV Infusion Running: No  Restraints on Currently or in the Last 24 Hours: No  Hand-Off Communication: pt is stable to come for dialysis.  Dialysis Catheter Dressing: will access upon arrival  Last Dressing Change: 8/26/24

## 2024-08-26 NOTE — ANESTHESIA POSTPROCEDURE EVALUATION
Patient: Batsheva Page    Procedure Summary       Date: 08/26/24 Room / Location: [facility]    Anesthesia Start: 1407 Anesthesia Stop: 1450    Procedure: IR CVC TUNNELED Diagnosis: (Insertion of a permanent tunneled dialysis catheter and removal of the temporary catheter)    Scheduled Providers: Thu Perdomo MD; Akua Denton MD; DANIELLA Giang Responsible Provider: Karlo De La Vega MD    Anesthesia Type: MAC ASA Status: 4            Anesthesia Type: MAC    Vitals Value Taken Time   /76 08/26/24 1455   Temp 36.5 °C (97.7 °F) 08/26/24 1449   Pulse 73 08/26/24 1455   Resp 19 08/26/24 1455   SpO2 100 % 08/26/24 1455       Anesthesia Post Evaluation    Patient location during evaluation: PACU  Patient participation: complete - patient participated  Level of consciousness: awake  Pain score: 0  Pain management: adequate  Multimodal analgesia pain management approach  Airway patency: patent  Two or more strategies used to mitigate risk of obstructive sleep apnea  Cardiovascular status: acceptable  Respiratory status: acceptable  Hydration status: acceptable  Postoperative Nausea and Vomiting: none        There were no known notable events for this encounter.

## 2024-08-26 NOTE — POST-PROCEDURE NOTE
Interventional Radiology Brief Postprocedure Note    Attending: Thu Perdomo MD      Assistant: none    Diagnosis: renal failure    Description of procedure: conversion of right temporary dialysis catheter for a new tunneled dialysis catheter     Anesthesia:  MAC    Complications: None    Estimated Blood Loss: none    Medications  As of 08/26/24 1400      oxyCODONE-acetaminophen (Percocet) 5-325 mg per tablet 2 tablet (tablet) Total dose:  2 tablet Dosing weight:  65      Date/Time Rate/Dose/Volume Action       08/18/24 2004 2 tablet Given               piperacillin-tazobactam (Zosyn) 4.5 g in dextrose (iso)  mL (g) Total dose:  4.5 g* Dosing weight:  65   *From user-documented volume     Date/Time Rate/Dose/Volume Action       08/18/24 2004 4.5 g (over 30 min) New Bag      2034 100 mL Stopped               vancomycin (Xellia) 2 g in diluent combination  mL (mL/hr) Total volume:  Not documented* Dosing weight:  65   *Total volume has not been documented. View each administration to see the amount administered.     Date/Time Rate/Dose/Volume Action       08/18/24 2056 2 g - 200 mL/hr (over 120 min) New Bag      2256  (over 120 min) Stopped               HYDROmorphone (Dilaudid) injection 1 mg (mg) Total dose:  1 mg Dosing weight:  65      Date/Time Rate/Dose/Volume Action       08/18/24 2053 1 mg Given               atorvastatin (Lipitor) tablet 40 mg (mg) Total dose:  200 mg*   *Administration not included in total     Date/Time Rate/Dose/Volume Action       08/18/24  2315 *40 mg Missed     08/19/24  2100 *40 mg Missed     08/20/24  2100 *40 mg Missed     08/21/24 2037 40 mg Given     08/22/24 2148 40 mg Given     08/23/24 2021 40 mg Given     08/24/24 2026 40 mg Given     08/25/24 2039 40 mg Given               calcitriol (Rocaltrol) capsule 0.5 mcg (mcg) Total dose:  4 mcg      Date/Time Rate/Dose/Volume Action       08/19/24  0846 0.5 mcg Given     08/20/24  0821 0.5 mcg Given      08/21/24  0924 0.5 mcg Given     08/22/24  0938 0.5 mcg Given     08/23/24  1221 0.5 mcg Given     08/24/24  0809 0.5 mcg Given     08/25/24  0801 0.5 mcg Given     08/26/24  0949 0.5 mcg Given               epoetin brandy-epbx (RETACRIT) injection 6,500 Units (Units) Total dose:  26,000 Units Dosing weight:  70      Date/Time Rate/Dose/Volume Action       08/19/24  0900 6,500 Units Given     08/21/24  1002 6,500 Units Given     08/23/24  1229 6,500 Units Given     08/26/24  0900 6,500 Units Given               levETIRAcetam (Keppra) tablet 750 mg (mg) Total dose:  750 mg*   *Administration not included in total     Date/Time Rate/Dose/Volume Action       08/18/24  2330 *750 mg Missed     08/19/24  2100 *750 mg Missed     08/20/24  2100 *750 mg Missed     08/21/24  2100 *750 mg Missed     08/22/24  2100 750 mg Given During Downtime     08/23/24 2022 *750 mg Missed     08/24/24 2027 *750 mg Missed     08/25/24  2100 *750 mg Missed               metoprolol tartrate (Lopressor) tablet 25 mg (mg) Total dose:  325 mg* Dosing weight:  65   *Administration not included in total     Date/Time Rate/Dose/Volume Action       08/18/24  2330 *25 mg Missed     08/19/24  0846 *25 mg Missed      2100 *25 mg Missed     08/20/24  0821 25 mg Given      2030 25 mg Given     08/21/24  0925 25 mg Given      2037 25 mg Given     08/22/24  0939 25 mg Given      2147 25 mg Given     08/23/24  1221 25 mg Given      2021 25 mg Given     08/24/24  0809 25 mg Given      2027 25 mg Given     08/25/24  0801 25 mg Given      2047 25 mg Given     08/26/24  0949 25 mg Given               mirtazapine (Remeron) tablet 15 mg (mg) Total dose:  45 mg* Dosing weight:  65   *Administration not included in total     Date/Time Rate/Dose/Volume Action       08/18/24  2330 *15 mg Missed     08/19/24  2100 *15 mg Missed     08/20/24  2100 *15 mg Missed     08/21/24 2100 *15 mg Missed     08/22/24 2147 15 mg Given     08/23/24 2021 15 mg Given     08/24/24   2026 *15 mg Missed     08/25/24 2039 15 mg Given               pregabalin (Lyrica) capsule 50 mg (mg) Total dose:  550 mg*   *Administration not included in total     Date/Time Rate/Dose/Volume Action       08/18/24  2330 *50 mg Missed     08/19/24  0846 50 mg Given      2100 *50 mg Missed     08/20/24  0821 50 mg Given      2100 *50 mg Missed     08/21/24  0900 *50 mg Missed      2100 *50 mg Missed     08/22/24  0939 50 mg Given      2147 50 mg Given     08/23/24  1220 50 mg Given      2020 50 mg Given     08/24/24  0809 50 mg Given      2026 50 mg Given     08/25/24  0801 50 mg Given      2039 50 mg Given     08/26/24  0949 50 mg Given               heparin (porcine) injection 5,000 Units (Units) Total dose:  0 Units* Dosing weight:  65   *Administration not included in total     Date/Time Rate/Dose/Volume Action       08/19/24  0600 *5,000 Units Missed      1600 *5,000 Units Missed      2200 *5,000 Units Missed     08/20/24  0600 *5,000 Units Missed      1400 *5,000 Units Missed      2200 *5,000 Units Missed     08/21/24  0600 *5,000 Units Missed      1400 *5,000 Units Missed      2200 *5,000 Units Missed     08/22/24  0600 *5,000 Units Missed      1400 *5,000 Units Missed      2147 *5,000 Units Missed     08/23/24  0600 *5,000 Units Missed      1400 *5,000 Units Missed      2200 *5,000 Units Missed     08/24/24  0600 *5,000 Units Missed      1400 *5,000 Units Missed      2134 *5,000 Units Missed     08/25/24  0600 *5,000 Units Missed      1400 *5,000 Units Missed      2200 *5,000 Units Missed     08/26/24  0600 *5,000 Units Missed               melatonin tablet 5 mg (mg) Total dose:  5 mg* Dosing weight:  65   *Administration not included in total     Date/Time Rate/Dose/Volume Action       08/23/24  2015 *5 mg Missed     08/25/24 2039 5 mg Given               piperacillin-tazobactam (Zosyn) 2.25 g in dextrose (iso) IV 50 mL (g) Total dose:  18 g* Dosing weight:  65   *From user-documented volume     Date/Time  Rate/Dose/Volume Action       08/19/24  0539 2.25 g (over 30 min) New Bag      0609 50 mL Stopped      1126 2.25 g (over 30 min) New Bag      1156  (over 30 min) Stopped      2057 2.25 g (over 30 min) New Bag      2127  (over 30 min) Stopped     08/20/24  0544 2.25 g (over 30 min) New Bag      0614  (over 30 min) Stopped      1200 150 mL       1332 2.25 g (over 30 min) - 50 mL New Bag      1402  (over 30 min) Stopped      2031 2.25 g (over 30 min) New Bag      2101  (over 30 min) Stopped     08/21/24  0449 2.25 g (over 30 min) New Bag      0519  (over 30 min) Stopped      1257 2.25 g (over 30 min) New Bag      1327  (over 30 min) Stopped      2035 2.25 g (over 30 min) New Bag      2105 50 mL Stopped     08/22/24  0419 2.25 g (over 30 min) New Bag      0449 50 mL Stopped      1118 2.25 g (over 30 min) - 50 mL New Bag      1148  (over 30 min) Stopped               HYDROmorphone (Dilaudid) injection 0.2 mg (mg) Total dose:  0.2 mg* Dosing weight:  65   *Administration not included in total     Date/Time Rate/Dose/Volume Action       08/20/24  0956 0.2 mg Given     08/21/24  2324 *0.2 mg Missed               HYDROmorphone (Dilaudid) injection 0.4 mg (mg) Total dose:  18.4 mg* Dosing weight:  65   *Administration not included in total     Date/Time Rate/Dose/Volume Action       08/18/24  2322 0.4 mg Given     08/19/24  0330 0.4 mg Given      0722 0.4 mg Given      1126 0.4 mg Given      1601 0.4 mg Given      2053 0.4 mg Given     08/20/24  0033 0.4 mg Given      0543 0.4 mg Given      1333 0.4 mg Given      1641 0.4 mg Given      2030 0.4 mg Given      2337 0.4 mg Given     08/21/24  0230 0.4 mg Given      0541 0.4 mg Given      0859 0.4 mg Given      1256 0.4 mg Given      1555 0.4 mg Given      2037 0.4 mg Given      2334 0.4 mg Given     08/22/24  0800 0.4 mg Given      1059 *0.4 mg Missed      1118 0.4 mg Given      1547 0.4 mg Given      2123 0.4 mg Given     08/23/24  0234 0.4 mg Given      1222 0.4 mg Given       1641 0.4 mg Given      2021 0.4 mg Given      2333 0.4 mg Given     08/24/24  0558 0.4 mg Given      0903 0.4 mg Given      1155 0.4 mg Given      1516 0.4 mg Given      1816 0.4 mg Given      2134 0.4 mg Given     08/25/24  0028 0.4 mg Given      0450 0.4 mg Given      0800 0.4 mg Given      1100 0.4 mg Given      1400 0.4 mg Given      1700 0.4 mg Given      2038 0.4 mg Given      2310 0.4 mg Given     08/26/24  0319 0.4 mg Given      0626 0.4 mg Given      0957 0.4 mg Given      1238 0.4 mg Given               vancomycin (Vancocin) 500 mg in dextrose 5% 100 mL IV (mL/hr) Total dose:  500 mg* Dosing weight:  65   *From user-documented volume     Date/Time Rate/Dose/Volume Action       08/19/24  0609 0 mL [vol]       1742 500 mg - 200 mL/hr (over 30 min) New Bag      1812  (over 30 min) Stopped     08/20/24  1200 100 mL [vol]                diphenhydrAMINE (BENADryl) injection 50 mg (mg) Total dose:  50 mg Dosing weight:  70      Date/Time Rate/Dose/Volume Action       08/19/24  0135 50 mg Given               diphenhydrAMINE (BENADryl) injection 25 mg (mg) Total dose:  550 mg* Dosing weight:  70   *Administration not included in total     Date/Time Rate/Dose/Volume Action       08/19/24  1126 25 mg Given      1742 25 mg Given     08/20/24  0006 25 mg Given      0821 25 mg Given      1419 25 mg Given      2030 25 mg Given     08/21/24  0859 25 mg Given      2157 25 mg Given     08/22/24  0800 25 mg Given      1059 *25 mg Missed      1712 25 mg Given      2358 25 mg Given     08/23/24  1538 25 mg Given      2229 25 mg Given     08/24/24  0804 25 mg Given      1406 25 mg Given      2023 25 mg Given     08/25/24  0230 25 mg Given      0849 25 mg Given      1645 25 mg Given      2310 25 mg Given     08/26/24  0504 25 mg Given      1151 25 mg Given               diphenhydrAMINE (BENADryl) injection 50 mg (mg) Total dose:  50 mg Dosing weight:  65      Date/Time Rate/Dose/Volume Action       08/21/24  1555 50 mg Given                diphenhydrAMINE (BENADryl) injection 50 mg (mg) Total dose:  50 mg Dosing weight:  65      Date/Time Rate/Dose/Volume Action       08/23/24  0832 50 mg Given               dextrose 5%-0.45 % sodium chloride infusion (mL/hr) Total volume:  0 mL* Dosing weight:  65   *From user-documented volume     Date/Time Rate/Dose/Volume Action       08/20/24  1200 0 mL       1332 *50 mL/hr Missed               lidocaine PF (Xylocaine) 10 mg/mL (1 %) injection (mL) Total volume:  10 mL      Date/Time Rate/Dose/Volume Action       08/20/24  1151 10 mL Given               heparin 1,000 unit/mL injection (Units) Total dose:  2,000 Units      Date/Time Rate/Dose/Volume Action       08/20/24  1217 2,000 Units Given               heparin 1,000 unit/mL injection 1,000 Units (Units) Total dose:  2,200 Units* Dosing weight:  65   *Administration not included in total     Date/Time Rate/Dose/Volume Action       08/21/24  1923 1,200 Units Given     08/22/24  1715 *1,000 Units Missed     08/23/24  1136 1,000 Units Given      1715 *1,000 Units Missed     08/24/24  1715 *1,000 Units Missed     08/25/24  1715 *1,000 Units Missed               heparin 1,000 unit/mL injection 1,000 Units (Units) Total dose:  2,200 Units* Dosing weight:  65   *Administration not included in total     Date/Time Rate/Dose/Volume Action       08/21/24  1922 1,200 Units Given     08/22/24  1715 *1,000 Units Missed     08/23/24  1135 1,000 Units Given      1715 *1,000 Units Missed     08/24/24  1715 *1,000 Units Missed     08/25/24  1715 *1,000 Units Missed               oxygen (O2) therapy (percent) Total dose:  21 percent Dosing weight:  65      Date/Time Rate/Dose/Volume Action       08/20/24  1329  Stopped      1341  Stopped      1355 21 percent Start               lactated Ringer's infusion (mL/hr) Total volume:  Not documented* Dosing weight:  65   *Total volume has not been documented. View each administration to see the amount administered.      Date/Time Rate/Dose/Volume Action       08/20/24  1330  Stopped               sodium zirconium cyclosilicate (Lokelma) packet 10 g (g) Total dose:  40 g* Dosing weight:  65   *Administration not included in total     Date/Time Rate/Dose/Volume Action       08/20/24  1640 10 g Given     08/21/24  0925 10 g Given     08/22/24  0939 10 g Given     08/23/24  1220 10 g Given     08/24/24  0757 *Not included in total Held by provider      0900 *10 g Missed     08/25/24  0900 *10 g Missed     08/26/24  0900 *Not included in total Automatically Held               oxyCODONE (Roxicodone) immediate release tablet 5 mg (mg) Total dose:  0 mg* Dosing weight:  65   *Administration not included in total     Date/Time Rate/Dose/Volume Action       08/23/24  1152 *5 mg Missed               potassium chloride (Klor-Con) packet 40 mEq (mEq) Total dose:  40 mEq Dosing weight:  65      Date/Time Rate/Dose/Volume Action       08/24/24  0809 40 mEq Given                   No specimens collected      See detailed result report with images in PACS.    The patient tolerated the procedure well without incident or complication and is in stable condition.

## 2024-08-26 NOTE — ANESTHESIA PREPROCEDURE EVALUATION
Patient: Batsheva Page    Procedure Information       Date/Time: 08/26/24 1300    Scheduled providers: Thu Perdomo MD; Akua Denton MD; DANIELLA Giang    Procedure: IR CVC TUNNELED    Location: [facility]            Relevant Problems   Cardiac   (+) HTN (hypertension)   (+) Paroxysmal atrial fibrillation (Multi)      Neuro   (+) Anxiety   (+) Depression with anxiety   (+) Major depressive disorder, recurrent, severe with psychotic symptoms (Multi)   (+) Seizure (Multi)      /Renal   (+) End-stage renal disease on hemodialysis (Multi)      Hematology   (+) Anemia secondary to renal failure      ID   (+) MRSA (methicillin resistant Staphylococcus aureus) infection      Circulatory   (+) Arteriovenous fistula (CMS-HCC)   (+) S/P MVR (mitral valve replacement)   (+) S/P aortic valve replacement      Cardiac and Vasculature   (+) History of endocarditis     Pt with hx of recurrent line infections and sepsis, has had multiple CVCs placed and removed.   Past Surgical History:   Procedure Laterality Date   • AORTIC VALVE REPLACEMENT  09/26/2022    bioprosthetic   • CORONARY ARTERY BYPASS GRAFT     • IR CVC  01/12/2024    exchange   • IR CVC  05/07/2024    exchange   • IR CVC  08/20/2024    removal   • IR CVC TUNNELED  09/09/2022    IR CVC TUNNELED 9/9/2022 [facility] INPATIENT LEGAC[facility]   • IR CVC TUNNELED  12/28/2022    IR CVC TUNNELED 12/28/2022 [facility] INPATIENT LEGACY   • MITRAL VALVE REPAIR  2013   • MITRAL VALVE REPLACEMENT  09/26/2022    bioprosthetic   • PARATHYROIDECTOMY     • TRICUSPID VALVULOPLASTY  2013   • US GUIDED PERCUTANEOUS PLACEMENT  07/14/2022    US GUIDED PERCUTANEOUS PLACEMENT [facility]         Clinical information reviewed:                   NPO Detail:  No data recorded     Physical Exam    Airway  Mallampati: IV  TM distance: >3 FB  Neck ROM: limited     Cardiovascular    Dental    Pulmonary    Abdominal        Anesthesia Plan    History of general  anesthesia?: yes  History of complications of general anesthesia?: no    ASA 4     MAC     The patient is not a current smoker.  Education provided regarding risk of obstructive sleep apnea.  intravenous induction   Anesthetic plan and risks discussed with patient.    Plan discussed with [facility]AA.

## 2024-08-26 NOTE — PROGRESS NOTES
Vancomycin Dosing by Pharmacy- FOLLOW-UP (HEMODIALYSIS)    Batsheva Page is a 48 y.o. year old female who Pharmacy has been consulted for vancomycin dosing for line infection. Based on the patient's indication and renal status this patient will be dosed based on a pre-HD level of 20-25 mcg/mL.     Patient is currently on hemodialysis MWF.    Current vancomycin regimen or maintenance dose: 500mg post-dialysis     Vancomycin pre-HD level 21 mcg/mL    Lab Results   Component Value Date    VANCORANDOM 21.9 (H) 08/26/2024    VANCOTROUGH 27.9 (HH) 09/26/2022       Visit Vitals  /68 (BP Location: Left leg, Patient Position: Lying)   Pulse 65   Temp 36.8 °C (98.2 °F) (Oral)   Resp 18        Lab Results   Component Value Date    CREATININE 8.20 (H) 08/26/2024    CREATININE 6.60 (H) 08/25/2024    CREATININE 5.20 (H) 08/24/2024    CREATININE 7.00 (H) 08/23/2024       No intake/output data recorded.    Assessment/Plan     Level is within target trough goal  Continue current regimen  Next pre-HD level will be obtained on 8/28 at 0500. May be obtained sooner if clinically indicated.    Will continue to monitor renal function daily while on vancomycin and order serum creatinine at least every 48 hours if not already ordered.  Follow for continued vancomycin needs, clinical response, and signs/symptoms of toxicity.     Amy Castro, PharmD

## 2024-08-26 NOTE — PROGRESS NOTES
Plan is for a new tunneled dialysis catheter to be today.  Patient receives dialysis at [facility] on M-W-F.   Will need to confirm with [facility] prior to discharge that order for Vanco with dialysis has been received .Will discharge home with no skilled needs.        08/26/24 1044   Current Planned Discharge Disposition   Current Planned Discharge Disposition Home

## 2024-08-26 NOTE — NURSING NOTE
The patient is in her room. She is crying. She said she wants to go home, she doesn't want to be here anymore. The new dialysis catheter does not work and they were unable to do her dialysis. Notified attending and consults.

## 2024-08-26 NOTE — PROGRESS NOTES
Spiritual Care Visit     Annotation: Volunteers met with patient today to pray with them in their room.      David Peguero

## 2024-08-26 NOTE — NURSING NOTE
"Notified by dialysis RN patient requesting Dilaudid and Benadryl. Report to dialysis with oral Dilaudid and oral Benadryl. Patient asks, \"What's this?\" Inform patient I have brought the Dilaudid and Benadryl she requested. Patient states she wants the IV form. Inform patient that she no longer has IV access and her physician ordered the medications in oral tablets. Patient states she does not want the tablets and asks if I can administer the IV meds through her dialysis catheter. Inform the patient that I cannot administer these meds through her dialysis catheter. Patient states she will talk to the doctor about that. Inform the patient that the doctor is aware she has no IV access, discontinued the IV Dilaudid and Benadryl and ordered these meds to be given orally. Patient states she does not want to take the medications orally and she will wait to talk to the doctor.  "

## 2024-08-26 NOTE — PROGRESS NOTES
Reason for consultation:  Left TDC tunnel site infection     Subjective:  Feels okay  Continues to complain of tenderness over the clavicular insertion of the left sternocleidomastoid muscle, and along the track of the recent tunneled catheter    Examination:  Afebrile, VSS  Chest: Left chest wall erythema resolved, remains tender over the clavicular insertion of the left sternocleidomastoid muscle and along the track of the recent tunneled catheter.  No drainage.  Temporary dialysis catheter in the right internal jugular vein.     Laboratory:  WBC: 9200     Blood culture (8/19 peripherally): X 2-NGTD  Blood culture (8/19 TDC): X 1-NGTD  Tunnel abscess site culture (8/19): Rare MRSA     Impression:  1.  TDC associated tunnel infection  -- Patient recently had her TDC changed over guidewire on 8/14/2024.  Shortly thereafter she began to develop increasing pain, erythema and more recently gross pus draining from the tunnel site.  IR removed left internal jugular TDC and placed new right temporary internal jugular dialysis catheter on 8/20/24.  Cultures remain sterile and the wound is growing rare MRSA.  There is no longer any purulent drainage, but the patient remains quite tender along the track of the recent cath     Plan:  1.  Continue vancomycin with dialysis, suggest continuing this for 2 weeks after placement of a new tunneled catheter.  2.  Await placement of new tunneled dialysis catheter and exploration of old track for retained pus, per Vascular Surgery      Adama Soto MD  [facility]  [phone]

## 2024-08-26 NOTE — PROGRESS NOTES
Batsheva Page is a 48 y.o. female on day 8 of admission presenting with Abscess.      Subjective   No complaints, to have new tunneled dialysis catheter placed today then HD this afternoon.        Objective          Vitals 24HR  Heart Rate:  [58-87]   Temp:  [36.5 °C (97.7 °F)-36.8 °C (98.2 °F)]   Resp:  [16-18]   BP: (123-186)/(43-71)   SpO2:  [93 %-100 %]       Intake/Output last 3 Shifts:    Intake/Output Summary (Last 24 hours) at 8/26/2024 1330  Last data filed at 8/26/2024 0949  Gross per 24 hour   Intake 120 ml   Output 0 ml   Net 120 ml       Physical Exam  Constitutional:       General: She is awake. She is not in acute distress.  Neck:      Comments: Right internal jugular temporary dialysis catheter present  Cardiovascular:      Rate and Rhythm: Regular rhythm.      Heart sounds:      No friction rub.   Pulmonary:      Effort: Pulmonary effort is normal.      Comments: Mildly diminished breath sounds left lung field  Abdominal:      General: Bowel sounds are normal.      Palpations: Abdomen is soft.      Tenderness: There is no guarding or rebound.   Musculoskeletal:      Right lower leg: No edema.      Left lower leg: No edema.   Neurological:      Mental Status: She is alert.         Relevant Results  Results for orders placed or performed during the hospital encounter of 08/18/24 (from the past 24 hour(s))   Vancomycin   Result Value Ref Range    Vancomycin 21.9 (H) 10.0 - 20.0 ug/mL   Basic Metabolic Panel   Result Value Ref Range    Glucose 111 (H) 65 - 99 mg/dL    Sodium 136 133 - 145 mmol/L    Potassium 3.9 3.4 - 5.1 mmol/L    Chloride 100 97 - 107 mmol/L    Bicarbonate 21 (L) 24 - 31 mmol/L    Urea Nitrogen 50 (H) 8 - 25 mg/dL    Creatinine 8.20 (H) 0.40 - 1.60 mg/dL    eGFR 6 (L) >60 mL/min/1.73m*2    Calcium 8.2 (L) 8.5 - 10.4 mg/dL    Anion Gap 15 <=19 mmol/L   CBC   Result Value Ref Range    WBC 9.2 4.4 - 11.3 x10*3/uL    nRBC 0.0 0.0 - 0.0 /100 WBCs    RBC 3.70 (L) 4.00 - 5.20 x10*6/uL     Hemoglobin 10.2 (L) 12.0 - 16.0 g/dL    Hematocrit 34.0 (L) 36.0 - 46.0 %    MCV 92 80 - 100 fL    MCH 27.6 26.0 - 34.0 pg    MCHC 30.0 (L) 32.0 - 36.0 g/dL    RDW 18.6 (H) 11.5 - 14.5 %    Platelets 235 150 - 450 x10*3/uL            Assessment/Plan   End-stage kidney disease - to have dialysis today after new tunneled cathter placement, continue dialysis on Monday Wednesday Friday  Infected dialysis catheter with abscess formation - continue antibiotic therapy per infectious disease  Status post aortic valve placement  History of endocarditis  History of recurrent admissions for bacteremia secondary to line infection  Anemia of chronic kidney disease  Hypertension    Vu Pedersen MD

## 2024-08-26 NOTE — PROGRESS NOTES
Batsheva Page is a 48 y.o. female on day 8 of admission presenting with Abscess.      Subjective   Patient was seen and examined at bedside.  Patient had her dialysis catheter replaced today under anesthesia and lost IV access     Objective     Last Recorded Vitals  /81   Pulse 70   Temp 36.5 °C (97.7 °F) (Temporal)   Resp 16   Wt 65 kg (143 lb 4.8 oz)   SpO2 96%   Intake/Output last 3 Shifts:    Intake/Output Summary (Last 24 hours) at 8/26/2024 1626  Last data filed at 8/26/2024 1450  Gross per 24 hour   Intake 170 ml   Output 10 ml   Net 160 ml       Admission Weight  Weight: 65 kg (143 lb 4.8 oz) (08/18/24 1910)    Daily Weight  08/20/24 : 65 kg (143 lb 4.8 oz)    Image Results  IR CVC tunneled  Narrative: Interpreted By:  Thu Perdomo,   STUDY:  IR CVC TUNNELED;  8/26/2024 2:51 pm      INDICATION:  Signs/Symptoms:Insertion of a permanent tunneled dialysis catheter  and removal of the temporary catheter.      COMPARISON:  None.      ACCESSION NUMBER(S):  [id number]      ORDERING CLINICIAN:  SADIE BAUTISTA      TECHNIQUE:  INTERVENTIONALIST(S):  Thu Perdomo MD      CONSENT:  The patient was informed of the nature of the proposed procedure. The  purposes, alternatives, risks, and benefits were explained and  discussed. All questions were answered and consent was obtained.      RADIATION EXPOSURE:  Fluoroscopy time: 0.1 min.  Dose: 0.12 mGy.  Dose Area Product (DAP): 522      Anesthesia:  Mac sedation by anesthesiology.      MEDICATION/CONTRAST:  No additional      TIME OUT:  A time out was performed immediately prior to procedure start with  the interventional team, correctly identifying the patient name, date  of birth, MRN, procedure, anatomy (including marking of site and  side), patient position, procedure consent form, relevant laboratory  and imaging test results, antibiotic administration, safety  precautions, and procedure-specific equipment needs.      COMPLICATIONS:  No  immediate adverse events identified.      FINDINGS:  In the recombinant position, the patient was positioned on the  angiography table.  The patient has an existing  right internal  jugular venous temporary  dialysis catheter in place.  The adjacent  cutaneous tissues and existing catheter were prepared and draped in  usual sterile manner.      After appropriate subcutaneous instillation of Lidocaine 1% local  anesthesia, the securing sutures of the existing temporary dialysis  catheter were removed.  The loaded heparin was aspirated from the  catheter ports.  A [address] guidewire was inserted through the  existing  dialysis catheter.  Utilizing intermittent fluoroscopic  guidance, the guidewire was advanced into the inferior vena cava to  secure access.  The existing catheter was removed with the guidewire  left in place.      Local anesthesia was achieved with subcutaneous Lidocaine 1%.  A  tunnel tract was created from the  right infraclavicular chest to the  existing venotomy site.      A  15 Ethiopian x 23 cm  dual-lumen catheter was selected for placement.  Venotomy site soft tissue dilation was performed to eventual  accommodation of a  15-Ethiopian dilator peel-away coaxial sheath  system.  The catheter was then advanced over the guidewire with the  tips to reside at the caval-atrial junction.  The ports were  aspirated without resistance and flushed with normal saline.  Heparinized saline was then loaded into the catheter ports.  The  external portions of the catheter were secured in place with  polypropylene suture.  The venotomy and tunnel sites were closed with  discontinuous and pursestring sutures, respectively.  Sterile  dressings were then applied.      The patient tolerated the procedure without complication.      Impression: 1. Technically successful conversion and placement of a  15 Ethiopian x  23 cm indwelling  hemodialysis catheter from a temporary  hemodialysis catheter.  2. The catheter tip  overlies the cavoatrial junction and is ready for  immediate use.      I was present for and/or performed the critical portions of the  procedure and immediately available throughout the entire procedure.      I personally reviewed the image(s) / study and resident  interpretation. I agree with the findings as stated.      Dictated at [facility].      MACRO:  None      Signed by: Thu Perdomo 8/26/2024 3:02 PM  Dictation workstation:   [id number]      Physical Exam  General: awake, alert, oriented, responsive  Cardiovascular: regular, normal S1 and S2  Neck: there was right neck dialysis catheter in place.. No erythema or bleeding or warmth or drainage  Chest: The left side of the chest at the previous dialysis catheter site showed no bleeding or drainage.   Extremities: no peripheral cyanosis, no pedal edema  Neuro: alert, oriented x 3, no focal weakness  Relevant Results      Assessment/Plan   Tunneled dialysis catheter site infection, suspected line sepsis/Right neck dialysis catheter was placed by IR on 8/20 and the left sided dialysis catheter was removed.        1. Blood cultures finalized with no growth         2. Tunneled placed today         3. ID recs vancomycin for 2 weeks with dialysis        2. End-stage renal disease      1. on hemodialysis per nephrology.     3. Hypertension      1. On metoprolol 25 mg bid     4. Status post aortic and mitral valve replacements  Of aortic and mitral valve endocarditis.     5. Seizure disorder      1. on Keppra.     Disposition: possible dc later today       Shy Remy DO

## 2024-08-26 NOTE — CARE PLAN
The patient's goals for the shift include REMOVE INFECTED DIALYSIS CATHETER.     The clinical goals for the shift include MANAGE PAIN    Over the shift, the patient did make progress toward the PLAN OF CARE goals.

## 2024-08-26 NOTE — NURSING NOTE
"Received handoff report via telephone from RN in the cath lab. I was informed the new dialysis catheter was placed in the same vessel path as the dialysis catheter removed today. I asked about a pigtail IV access on the new dialysis catheter for drawing labs and administering IV medications. The cath lab RN informed me the IR physician did not provide this access as they were told the patient was being discharged today and would not need IV access. Verified with cath lab RN that patient does not have IV access other than the dialysis catheter and received verbal confirmation of this. Cath lab RN then informs me patient is being transported to PACU at this time to recover prior to returned to the regular nursing unit as patient was \"heavily sedated\" at this time. Notify the cath lab RN that patient needs to be transported to dialysis from PACU.  Telephone call to attending physician, Dr. Hu, and inform her of the no IV access conversation with the cath lab RN. Dr. Hu states she will address this. Inform Dr. Hu patient being transported to PACU then to dialysis once recovered.  "

## 2024-08-27 ENCOUNTER — APPOINTMENT (OUTPATIENT)
Dept: CARDIOLOGY | Facility: HOSPITAL | Age: 49
DRG: 314 | End: 2024-08-27
Payer: MEDICARE

## 2024-08-27 LAB
ALBUMIN SERPL-MCNC: 3 G/DL (ref 3.5–5)
ANION GAP SERPL CALC-SCNC: >19 MMOL/L
BASOPHILS # BLD AUTO: 0.04 X10*3/UL (ref 0–0.1)
BASOPHILS NFR BLD AUTO: 0.4 %
BUN SERPL-MCNC: 54 MG/DL (ref 8–25)
CALCIUM SERPL-MCNC: 7.9 MG/DL (ref 8.5–10.4)
CHLORIDE SERPL-SCNC: 99 MMOL/L (ref 97–107)
CO2 SERPL-SCNC: 13 MMOL/L (ref 24–31)
CREAT SERPL-MCNC: 10.1 MG/DL (ref 0.4–1.6)
EGFRCR SERPLBLD CKD-EPI 2021: 4 ML/MIN/1.73M*2
EOSINOPHIL # BLD AUTO: 0.07 X10*3/UL (ref 0–0.7)
EOSINOPHIL NFR BLD AUTO: 0.6 %
ERYTHROCYTE [DISTWIDTH] IN BLOOD BY AUTOMATED COUNT: 18.6 % (ref 11.5–14.5)
GLUCOSE SERPL-MCNC: 92 MG/DL (ref 65–99)
HCT VFR BLD AUTO: 34.9 % (ref 36–46)
HGB BLD-MCNC: 10.2 G/DL (ref 12–16)
IMM GRANULOCYTES # BLD AUTO: 0.05 X10*3/UL (ref 0–0.7)
IMM GRANULOCYTES NFR BLD AUTO: 0.5 % (ref 0–0.9)
LYMPHOCYTES # BLD AUTO: 0.64 X10*3/UL (ref 1.2–4.8)
LYMPHOCYTES NFR BLD AUTO: 5.8 %
MAGNESIUM SERPL-MCNC: 1.9 MG/DL (ref 1.6–3.1)
MCH RBC QN AUTO: 28.3 PG (ref 26–34)
MCHC RBC AUTO-ENTMCNC: 29.2 G/DL (ref 32–36)
MCV RBC AUTO: 97 FL (ref 80–100)
MONOCYTES # BLD AUTO: 0.41 X10*3/UL (ref 0.1–1)
MONOCYTES NFR BLD AUTO: 3.7 %
NEUTROPHILS # BLD AUTO: 9.75 X10*3/UL (ref 1.2–7.7)
NEUTROPHILS NFR BLD AUTO: 89 %
NRBC BLD-RTO: 0 /100 WBCS (ref 0–0)
PHOSPHATE SERPL-MCNC: 6.4 MG/DL (ref 2.5–4.5)
PLATELET # BLD AUTO: 221 X10*3/UL (ref 150–450)
POTASSIUM SERPL-SCNC: 3.9 MMOL/L (ref 3.4–5.1)
RBC # BLD AUTO: 3.61 X10*6/UL (ref 4–5.2)
SODIUM SERPL-SCNC: 135 MMOL/L (ref 133–145)
WBC # BLD AUTO: 11 X10*3/UL (ref 4.4–11.3)

## 2024-08-27 PROCEDURE — 84100 ASSAY OF PHOSPHORUS: CPT | Performed by: INTERNAL MEDICINE

## 2024-08-27 PROCEDURE — 82310 ASSAY OF CALCIUM: CPT | Performed by: INTERNAL MEDICINE

## 2024-08-27 PROCEDURE — 36415 COLL VENOUS BLD VENIPUNCTURE: CPT | Performed by: INTERNAL MEDICINE

## 2024-08-27 PROCEDURE — 99233 SBSQ HOSP IP/OBS HIGH 50: CPT | Performed by: INTERNAL MEDICINE

## 2024-08-27 PROCEDURE — 2500000004 HC RX 250 GENERAL PHARMACY W/ HCPCS (ALT 636 FOR OP/ED): Performed by: INTERNAL MEDICINE

## 2024-08-27 PROCEDURE — 2500000001 HC RX 250 WO HCPCS SELF ADMINISTERED DRUGS (ALT 637 FOR MEDICARE OP): Performed by: INTERNAL MEDICINE

## 2024-08-27 PROCEDURE — 85025 COMPLETE CBC W/AUTO DIFF WBC: CPT | Performed by: INTERNAL MEDICINE

## 2024-08-27 PROCEDURE — 83735 ASSAY OF MAGNESIUM: CPT | Performed by: INTERNAL MEDICINE

## 2024-08-27 PROCEDURE — 1200000002 HC GENERAL ROOM WITH TELEMETRY DAILY

## 2024-08-27 RX ORDER — HYDRALAZINE HYDROCHLORIDE 50 MG/1
50 TABLET, FILM COATED ORAL 3 TIMES DAILY
Status: DISCONTINUED | OUTPATIENT
Start: 2024-08-27 | End: 2024-08-27

## 2024-08-27 RX ORDER — HYDROMORPHONE HYDROCHLORIDE 1 MG/ML
0.6 INJECTION, SOLUTION INTRAMUSCULAR; INTRAVENOUS; SUBCUTANEOUS
Status: DISCONTINUED | OUTPATIENT
Start: 2024-08-27 | End: 2024-08-29 | Stop reason: HOSPADM

## 2024-08-27 RX ORDER — OXYCODONE AND ACETAMINOPHEN 5; 325 MG/1; MG/1
1 TABLET ORAL EVERY 6 HOURS PRN
Status: DISCONTINUED | OUTPATIENT
Start: 2024-08-27 | End: 2024-08-29 | Stop reason: HOSPADM

## 2024-08-27 RX ORDER — DIPHENHYDRAMINE HYDROCHLORIDE 50 MG/ML
12.5 INJECTION INTRAMUSCULAR; INTRAVENOUS EVERY 6 HOURS PRN
Status: DISCONTINUED | OUTPATIENT
Start: 2024-08-27 | End: 2024-08-29 | Stop reason: HOSPADM

## 2024-08-27 RX ORDER — LORAZEPAM 0.5 MG/1
0.5 TABLET ORAL EVERY 6 HOURS PRN
Status: DISCONTINUED | OUTPATIENT
Start: 2024-08-27 | End: 2024-08-29 | Stop reason: HOSPADM

## 2024-08-27 RX ORDER — CLONIDINE HYDROCHLORIDE 0.1 MG/1
0.1 TABLET ORAL EVERY 8 HOURS SCHEDULED
Status: DISCONTINUED | OUTPATIENT
Start: 2024-08-27 | End: 2024-08-29 | Stop reason: HOSPADM

## 2024-08-27 RX ADMIN — HYDRALAZINE HYDROCHLORIDE 75 MG: 50 TABLET ORAL at 20:30

## 2024-08-27 RX ADMIN — METOPROLOL TARTRATE 25 MG: 25 TABLET, FILM COATED ORAL at 15:32

## 2024-08-27 RX ADMIN — CLONIDINE HYDROCHLORIDE 0.1 MG: 0.1 TABLET ORAL at 20:51

## 2024-08-27 RX ADMIN — PREGABALIN 50 MG: 25 CAPSULE ORAL at 20:29

## 2024-08-27 RX ADMIN — HYDROMORPHONE HYDROCHLORIDE 4 MG: 2 TABLET ORAL at 09:22

## 2024-08-27 RX ADMIN — HYDROMORPHONE HYDROCHLORIDE 0.6 MG: 1 INJECTION, SOLUTION INTRAMUSCULAR; INTRAVENOUS; SUBCUTANEOUS at 13:40

## 2024-08-27 RX ADMIN — HYDROMORPHONE HYDROCHLORIDE 0.6 MG: 1 INJECTION, SOLUTION INTRAMUSCULAR; INTRAVENOUS; SUBCUTANEOUS at 16:48

## 2024-08-27 RX ADMIN — MIRTAZAPINE 15 MG: 15 TABLET, FILM COATED ORAL at 21:00

## 2024-08-27 RX ADMIN — DIPHENHYDRAMINE HYDROCHLORIDE 12.5 MG: 50 INJECTION, SOLUTION INTRAMUSCULAR; INTRAVENOUS at 23:50

## 2024-08-27 RX ADMIN — HYDROMORPHONE HYDROCHLORIDE 4 MG: 2 TABLET ORAL at 03:41

## 2024-08-27 RX ADMIN — DIPHENHYDRAMINE HYDROCHLORIDE 12.5 MG: 50 INJECTION, SOLUTION INTRAMUSCULAR; INTRAVENOUS at 18:05

## 2024-08-27 RX ADMIN — ATORVASTATIN CALCIUM 40 MG: 40 TABLET, FILM COATED ORAL at 20:30

## 2024-08-27 RX ADMIN — DIPHENHYDRAMINE HYDROCHLORIDE 12.5 MG: 50 INJECTION, SOLUTION INTRAMUSCULAR; INTRAVENOUS at 11:46

## 2024-08-27 RX ADMIN — METOPROLOL TARTRATE 25 MG: 25 TABLET, FILM COATED ORAL at 20:30

## 2024-08-27 RX ADMIN — HYDROMORPHONE HYDROCHLORIDE 0.6 MG: 1 INJECTION, SOLUTION INTRAMUSCULAR; INTRAVENOUS; SUBCUTANEOUS at 20:29

## 2024-08-27 RX ADMIN — Medication 5 MG: at 20:30

## 2024-08-27 RX ADMIN — HYDROMORPHONE HYDROCHLORIDE 0.6 MG: 1 INJECTION, SOLUTION INTRAMUSCULAR; INTRAVENOUS; SUBCUTANEOUS at 23:50

## 2024-08-27 ASSESSMENT — COGNITIVE AND FUNCTIONAL STATUS - GENERAL
MOBILITY SCORE: 24
DAILY ACTIVITIY SCORE: 24

## 2024-08-27 ASSESSMENT — PAIN SCALES - GENERAL
PAINLEVEL_OUTOF10: 10 - WORST POSSIBLE PAIN
PAINLEVEL_OUTOF10: 8
PAINLEVEL_OUTOF10: 10 - WORST POSSIBLE PAIN
PAINLEVEL_OUTOF10: 4
PAINLEVEL_OUTOF10: 10 - WORST POSSIBLE PAIN
PAINLEVEL_OUTOF10: 0 - NO PAIN

## 2024-08-27 ASSESSMENT — PAIN DESCRIPTION - DESCRIPTORS
DESCRIPTORS: ACHING
DESCRIPTORS: ACHING
DESCRIPTORS: DISCOMFORT;ACHING
DESCRIPTORS: ACHING
DESCRIPTORS: DISCOMFORT
DESCRIPTORS: ACHING;DISCOMFORT

## 2024-08-27 ASSESSMENT — PAIN - FUNCTIONAL ASSESSMENT
PAIN_FUNCTIONAL_ASSESSMENT: 0-10

## 2024-08-27 ASSESSMENT — PAIN DESCRIPTION - LOCATION
LOCATION: CHEST
LOCATION: CHEST

## 2024-08-27 NOTE — CARE PLAN
The patient's goals for the shift include      The clinical goals for the shift include NO PAIN      Problem: Pain - Adult  Goal: Verbalizes/displays adequate comfort level or baseline comfort level  Outcome: Progressing     Problem: Safety - Adult  Goal: Free from fall injury  Outcome: Progressing     Problem: Discharge Planning  Goal: Discharge to home or other facility with appropriate resources  Outcome: Progressing     Problem: Chronic Conditions and Co-morbidities  Goal: Patient's chronic conditions and co-morbidity symptoms are monitored and maintained or improved  Outcome: Progressing     Problem: Fall/Injury  Goal: Not fall by end of shift  Outcome: Progressing  Goal: Be free from injury by end of the shift  Outcome: Progressing  Goal: Verbalize understanding of personal risk factors for fall in the hospital  Outcome: Progressing  Goal: Verbalize understanding of risk factor reduction measures to prevent injury from fall in the home  Outcome: Progressing  Goal: Pace activities to prevent fatigue by end of the shift  Outcome: Progressing     Problem: Pain  Goal: Takes deep breaths with improved pain control throughout the shift  Outcome: Progressing  Goal: Turns in bed with improved pain control throughout the shift  Outcome: Progressing  Goal: Walks with improved pain control throughout the shift  Outcome: Progressing  Goal: Performs ADL's with improved pain control throughout shift  Outcome: Progressing  Goal: Free from opioid side effects throughout the shift  Outcome: Progressing  Goal: Free from acute confusion related to pain meds throughout the shift  Outcome: Progressing

## 2024-08-27 NOTE — NURSING NOTE
Pt has a R chest dialysis catheter, drsg intact, there is a moderate amt of dried bloody drainage under drsg. Pt is getting the dialysis catheter replaced today, is not functioning.

## 2024-08-27 NOTE — NURSING NOTE
"Patient extremely anxious. Patient states she is \"swelling up\". Patient does have swelling in her bilateral upper extremities. Patient upset about dialysis catheter not functioning and need for replacement catheter. Contacted hospitalist team regarding swelling and to please come to the bedside. Vital signs obtained.   "

## 2024-08-27 NOTE — NURSING NOTE
Hospitalist team and Dr Pedersen notified that [facility] unable to exchange dialysis catheter due to shortage of anesthesia staff. Patient is very upset.

## 2024-08-27 NOTE — PROGRESS NOTES
Batsheva Page is a 48 y.o. female on day 9 of admission presenting with Abscess.      Subjective   Patient reports feeling puffy, otherwise no specific symptoms however yesterday her dialysis catheter was not functioning properly and she has been very worried and anxious about this. She is scheduled this afternoon to have a new tunneled dialysis catheter placed.       Objective          Vitals 24HR  Heart Rate:  [68-97]   Temp:  [36.5 °C (97.7 °F)-37.3 °C (99.1 °F)]   Resp:  [13-20]   BP: (117-186)/()   SpO2:  [93 %-100 %]       Intake/Output last 3 Shifts:    Intake/Output Summary (Last 24 hours) at 8/27/2024 1138  Last data filed at 8/27/2024 0926  Gross per 24 hour   Intake 156.42 ml   Output 10 ml   Net 146.42 ml       Physical Exam  Constitutional:       General: She is awake. She is not in acute distress.  Neck:      Comments: Right internal jugular tunneled dialysis catheter present  Cardiovascular:      Rate and Rhythm: Regular rhythm.      Heart sounds:      No friction rub.   Pulmonary:      Effort: Pulmonary effort is normal.      Comments: Clear breath sounds   Abdominal:      General: Bowel sounds are normal.      Palpations: Abdomen is soft.      Tenderness: There is no guarding or rebound.   Musculoskeletal:      Right lower leg: No edema.      Left lower leg: No edema.     Relevant Results  Results for orders placed or performed during the hospital encounter of 08/18/24 (from the past 24 hour(s))   POCT GLUCOSE   Result Value Ref Range    POCT Glucose 103 (H) 74 - 99 mg/dL   CBC and Auto Differential   Result Value Ref Range    WBC 11.0 4.4 - 11.3 x10*3/uL    nRBC 0.0 0.0 - 0.0 /100 WBCs    RBC 3.61 (L) 4.00 - 5.20 x10*6/uL    Hemoglobin 10.2 (L) 12.0 - 16.0 g/dL    Hematocrit 34.9 (L) 36.0 - 46.0 %    MCV 97 80 - 100 fL    MCH 28.3 26.0 - 34.0 pg    MCHC 29.2 (L) 32.0 - 36.0 g/dL    RDW 18.6 (H) 11.5 - 14.5 %    Platelets 221 150 - 450 x10*3/uL    Neutrophils % 89.0 40.0 - 80.0 %     Immature Granulocytes %, Automated 0.5 0.0 - 0.9 %    Lymphocytes % 5.8 13.0 - 44.0 %    Monocytes % 3.7 2.0 - 10.0 %    Eosinophils % 0.6 0.0 - 6.0 %    Basophils % 0.4 0.0 - 2.0 %    Neutrophils Absolute 9.75 (H) 1.20 - 7.70 x10*3/uL    Immature Granulocytes Absolute, Automated 0.05 0.00 - 0.70 x10*3/uL    Lymphocytes Absolute 0.64 (L) 1.20 - 4.80 x10*3/uL    Monocytes Absolute 0.41 0.10 - 1.00 x10*3/uL    Eosinophils Absolute 0.07 0.00 - 0.70 x10*3/uL    Basophils Absolute 0.04 0.00 - 0.10 x10*3/uL   Renal Function Panel   Result Value Ref Range    Glucose 92 65 - 99 mg/dL    Sodium 135 133 - 145 mmol/L    Potassium 3.9 3.4 - 5.1 mmol/L    Chloride 99 97 - 107 mmol/L    Bicarbonate 13 (L) 24 - 31 mmol/L    Urea Nitrogen 54 (H) 8 - 25 mg/dL    Creatinine 10.10 (H) 0.40 - 1.60 mg/dL    eGFR 4 (L) >60 mL/min/1.73m*2    Calcium 7.9 (L) 8.5 - 10.4 mg/dL    Phosphorus 6.4 (H) 2.5 - 4.5 mg/dL    Albumin 3.0 (L) 3.5 - 5.0 g/dL    Anion Gap >19 (H) <=19 mmol/L   Magnesium   Result Value Ref Range    Magnesium 1.90 1.60 - 3.10 mg/dL            Assessment/Plan   End-stage kidney disease - schedule dialysis today after new tunneled catheter placement since she was not able to have dialysis yesterday due to malfunctioning catheter, then will eventually get her back on her regular Monday Wednesday Friday schedule  Infected dialysis catheter with abscess formation - continue antibiotic therapy per infectious disease  Status post aortic valve placement  History of endocarditis  History of recurrent admissions for bacteremia secondary to line infection  Anemia of chronic kidney disease  Hypertension    Vu Pedersen MD

## 2024-08-27 NOTE — NURSING NOTE
Patient returned from [facility] - they were unable to exchange dialysis cath at this time. Hospitalist contacted for diet order. Metoprolol administered for elevated heart rate.

## 2024-08-27 NOTE — PROGRESS NOTES
Spiritual Care Visit    Clinical Encounter Type  Routine Visit: Follow-up  Continue Visiting: Yes         Values/Beliefs  Spiritual Requests During Hospitalization: Tried to met with Batsheva today. [place] from someone there may have been a death in her afmily and wanted to talk to her about this. Will do this tomorrow.     David Peguero

## 2024-08-27 NOTE — CARE PLAN
Problem: Pain - Adult  Goal: Verbalizes/displays adequate comfort level or baseline comfort level  Outcome: Progressing     Problem: Safety - Adult  Goal: Free from fall injury  Outcome: Progressing     Problem: Discharge Planning  Goal: Discharge to home or other facility with appropriate resources  Outcome: Progressing     Problem: Chronic Conditions and Co-morbidities  Goal: Patient's chronic conditions and co-morbidity symptoms are monitored and maintained or improved  Outcome: Progressing     Problem: Fall/Injury  Goal: Not fall by end of shift  Outcome: Progressing  Goal: Be free from injury by end of the shift  Outcome: Progressing  Goal: Verbalize understanding of personal risk factors for fall in the hospital  Outcome: Progressing  Goal: Verbalize understanding of risk factor reduction measures to prevent injury from fall in the home  Outcome: Progressing  Goal: Pace activities to prevent fatigue by end of the shift  Outcome: Progressing     Problem: Pain  Goal: Takes deep breaths with improved pain control throughout the shift  Outcome: Progressing  Goal: Turns in bed with improved pain control throughout the shift  Outcome: Progressing  Goal: Walks with improved pain control throughout the shift  Outcome: Progressing  Goal: Performs ADL's with improved pain control throughout shift  Outcome: Progressing  Goal: Free from opioid side effects throughout the shift  Outcome: Progressing  Goal: Free from acute confusion related to pain meds throughout the shift  Outcome: Progressing   The patient's goals for the shift include      The clinical goals for the shift include NO PAIN    Over the shift, the patient did not make progress toward the following goals. Barriers to progression include . Recommendations to address these barriers include .

## 2024-08-27 NOTE — PROGRESS NOTES
Pt had dialysis catheter placed yesterday. Not functioning properly this morning. Pt was taken to [facility] to have replacement dialysis cath but was unsuccessful. Patient receives dialysis at [facility] on M-W-F. Will need to confirm with [facility] prior to discharge that order for Vanco with dialysis has been received .Will discharge home with no skilled needs. [facility] following.     DISCHARGE PLAN TO RETURN HOME NO SKILLED NEEDS WHEN MED READY.        08/27/24 1612   Current Planned Discharge Disposition   Current Planned Discharge Disposition Home

## 2024-08-27 NOTE — PROGRESS NOTES
Batsheva Page is a 48 y.o. female on day 9 of admission presenting with Abscess.      Subjective   Patient is lying on the bed, complaining of generalized pain and also generalized swelling.  No fever chills or rigors.  No other significant overnight events.    Objective     Last Recorded Vitals  /74 (BP Location: Right leg, Patient Position: Lying)   Pulse 94   Temp 36.7 °C (98.1 °F) (Oral)   Resp 20   Wt 65 kg (143 lb 4.8 oz)   SpO2 97%   Intake/Output last 3 Shifts:    Intake/Output Summary (Last 24 hours) at 8/27/2024 1213  Last data filed at 8/27/2024 0926  Gross per 24 hour   Intake 156.42 ml   Output 10 ml   Net 146.42 ml       Admission Weight  Weight: 65 kg (143 lb 4.8 oz) (08/18/24 1910)    Daily Weight  08/20/24 : 65 kg (143 lb 4.8 oz)      Physical Exam  General: No distress.  Neck: Right internal dialysis catheter is in place, tender around the catheter.  HEENT: Normocephalic atraumatic pupils equal acuity  Heart: S1-S2 heard, tachycardic  Lungs: Clear to auscultation bilaterally  Neuro: No focal deficits    Relevant Results  Scheduled medications  atorvastatin, 40 mg, oral, Nightly  calcitriol, 0.5 mcg, oral, Daily  cloNIDine, 0.1 mg, oral, q8h KIMBERLY  epoetin brandy-epbx, 6,500 Units, subcutaneous, Once per day on Monday Wednesday Friday  heparin (porcine), 5,000 Units, subcutaneous, q8h  heparin, 1,000 Units, intra-catheter, After Dialysis  heparin, 1,000 Units, intra-catheter, After Dialysis  hydrALAZINE, 75 mg, oral, TID  levETIRAcetam, 750 mg, oral, Nightly  metoprolol tartrate, 25 mg, oral, BID  mirtazapine, 15 mg, oral, Nightly  pregabalin, 50 mg, oral, BID  [Held by provider] sodium zirconium cyclosilicate, 10 g, oral, Daily  vancomycin, 500 mg, intravenous, Once per day on Monday Wednesday Friday      Continuous medications     PRN medications  PRN medications: acetaminophen **OR** acetaminophen **OR** acetaminophen, benzocaine-menthol, dextromethorphan-guaifenesin,  diphenhydrAMINE, diphenhydrAMINE, guaiFENesin, heparin, HYDROmorphone, lidocaine PF, LORazepam, melatonin, ondansetron ODT **OR** ondansetron, oxyCODONE, oxyCODONE-acetaminophen, oxygen, oxygen, oxygen, vancomycin    Results for orders placed or performed during the hospital encounter of 08/18/24 (from the past 24 hour(s))   POCT GLUCOSE   Result Value Ref Range    POCT Glucose 103 (H) 74 - 99 mg/dL   CBC and Auto Differential   Result Value Ref Range    WBC 11.0 4.4 - 11.3 x10*3/uL    nRBC 0.0 0.0 - 0.0 /100 WBCs    RBC 3.61 (L) 4.00 - 5.20 x10*6/uL    Hemoglobin 10.2 (L) 12.0 - 16.0 g/dL    Hematocrit 34.9 (L) 36.0 - 46.0 %    MCV 97 80 - 100 fL    MCH 28.3 26.0 - 34.0 pg    MCHC 29.2 (L) 32.0 - 36.0 g/dL    RDW 18.6 (H) 11.5 - 14.5 %    Platelets 221 150 - 450 x10*3/uL    Neutrophils % 89.0 40.0 - 80.0 %    Immature Granulocytes %, Automated 0.5 0.0 - 0.9 %    Lymphocytes % 5.8 13.0 - 44.0 %    Monocytes % 3.7 2.0 - 10.0 %    Eosinophils % 0.6 0.0 - 6.0 %    Basophils % 0.4 0.0 - 2.0 %    Neutrophils Absolute 9.75 (H) 1.20 - 7.70 x10*3/uL    Immature Granulocytes Absolute, Automated 0.05 0.00 - 0.70 x10*3/uL    Lymphocytes Absolute 0.64 (L) 1.20 - 4.80 x10*3/uL    Monocytes Absolute 0.41 0.10 - 1.00 x10*3/uL    Eosinophils Absolute 0.07 0.00 - 0.70 x10*3/uL    Basophils Absolute 0.04 0.00 - 0.10 x10*3/uL   Renal Function Panel   Result Value Ref Range    Glucose 92 65 - 99 mg/dL    Sodium 135 133 - 145 mmol/L    Potassium 3.9 3.4 - 5.1 mmol/L    Chloride 99 97 - 107 mmol/L    Bicarbonate 13 (L) 24 - 31 mmol/L    Urea Nitrogen 54 (H) 8 - 25 mg/dL    Creatinine 10.10 (H) 0.40 - 1.60 mg/dL    eGFR 4 (L) >60 mL/min/1.73m*2    Calcium 7.9 (L) 8.5 - 10.4 mg/dL    Phosphorus 6.4 (H) 2.5 - 4.5 mg/dL    Albumin 3.0 (L) 3.5 - 5.0 g/dL    Anion Gap >19 (H) <=19 mmol/L   Magnesium   Result Value Ref Range    Magnesium 1.90 1.60 - 3.10 mg/dL           Assessment/Plan   Sepsis.  Infected tunneled dialysis catheter    History  of:  End-stage renal disease on hemodialysis Monday Wednesday Friday.  Endocarditis.  Status post aortic and mitral valve replacement.  Seizure disorder.  Hypertension    Plan:  Signs of sepsis improved.  Status post removal of left-sided dialysis catheter and temporary right side hemodialysis catheter was placed on 8/20.  S/p conversion of temporary dialysis catheter to a tunneled dialysis catheter on 8/26.  Unable to perform hemodialysis on 8/26 due to malfunction of the catheter.  Patient is scheduled for revision/exchange of right tunneled dialysis catheter today.  Nephrology is planning for hemodialysis after the procedure  Infectious disease recommended to continue vancomycin for 2 more weeks with the dialysis.  Blood pressure is controlled after resuming home medications per  Continue Keppra.  Follow-up with CBC BMP ordered.    DVT prophylaxis:  Heparin subcu.    Disposition:  Possible discharge in next 24 to 48 hours, After the procedure and hemodialysis.

## 2024-08-27 NOTE — PROGRESS NOTES
BRIEF  ID  CHART  REVIEW  NOTE      Chart reviewed  Patient is off the unit undergoing placement of a new dialysis catheter    Suggest maintaining post-dialysis vancomycin for total of 2 weeks, pharmacy dosing, discussed with Radha Fallon, Pharm.D.    Will reassess 8/28, please call sooner nelly Soto MD  [facility] [employer]  Office:  [phone]

## 2024-08-27 NOTE — NURSING NOTE
Physician notified of patient concern regarding cath. They are aware and have seen patient and address her concerns multiple times will continue to monitor.

## 2024-08-28 ENCOUNTER — ANESTHESIA EVENT (OUTPATIENT)
Dept: CARDIOLOGY | Facility: HOSPITAL | Age: 49
DRG: 314 | End: 2024-08-28
Payer: MEDICARE

## 2024-08-28 ENCOUNTER — APPOINTMENT (OUTPATIENT)
Dept: DIALYSIS | Facility: HOSPITAL | Age: 49
End: 2024-08-28
Payer: MEDICARE

## 2024-08-28 ENCOUNTER — ANESTHESIA (OUTPATIENT)
Dept: CARDIOLOGY | Facility: HOSPITAL | Age: 49
DRG: 314 | End: 2024-08-28
Payer: MEDICARE

## 2024-08-28 ENCOUNTER — APPOINTMENT (OUTPATIENT)
Dept: CARDIOLOGY | Facility: HOSPITAL | Age: 49
DRG: 314 | End: 2024-08-28
Payer: MEDICARE

## 2024-08-28 LAB
ALBUMIN SERPL-MCNC: 3.2 G/DL (ref 3.5–5)
ANION GAP SERPL CALC-SCNC: 17 MMOL/L
BUN SERPL-MCNC: 53 MG/DL (ref 8–25)
CALCIUM SERPL-MCNC: 7.8 MG/DL (ref 8.5–10.4)
CHLORIDE SERPL-SCNC: 98 MMOL/L (ref 97–107)
CO2 SERPL-SCNC: 20 MMOL/L (ref 24–31)
CREAT SERPL-MCNC: 9.2 MG/DL (ref 0.4–1.6)
EGFRCR SERPLBLD CKD-EPI 2021: 5 ML/MIN/1.73M*2
ERYTHROCYTE [DISTWIDTH] IN BLOOD BY AUTOMATED COUNT: 18.8 % (ref 11.5–14.5)
GLUCOSE SERPL-MCNC: 97 MG/DL (ref 65–99)
HCT VFR BLD AUTO: 30.8 % (ref 36–46)
HGB BLD-MCNC: 9.6 G/DL (ref 12–16)
HOLD SPECIMEN: NORMAL
MCH RBC QN AUTO: 28.1 PG (ref 26–34)
MCHC RBC AUTO-ENTMCNC: 31.2 G/DL (ref 32–36)
MCV RBC AUTO: 90 FL (ref 80–100)
NRBC BLD-RTO: 0 /100 WBCS (ref 0–0)
PHOSPHATE SERPL-MCNC: 5.9 MG/DL (ref 2.5–4.5)
PLATELET # BLD AUTO: 224 X10*3/UL (ref 150–450)
POTASSIUM SERPL-SCNC: 4.3 MMOL/L (ref 3.4–5.1)
RBC # BLD AUTO: 3.42 X10*6/UL (ref 4–5.2)
SODIUM SERPL-SCNC: 135 MMOL/L (ref 133–145)
VANCOMYCIN SERPL-MCNC: 19.1 UG/ML (ref 10–20)
WBC # BLD AUTO: 7.6 X10*3/UL (ref 4.4–11.3)

## 2024-08-28 PROCEDURE — 99233 SBSQ HOSP IP/OBS HIGH 50: CPT | Performed by: FAMILY MEDICINE

## 2024-08-28 PROCEDURE — 1200000002 HC GENERAL ROOM WITH TELEMETRY DAILY

## 2024-08-28 PROCEDURE — 36597 REPOSITION VENOUS CATHETER: CPT

## 2024-08-28 PROCEDURE — 36581 REPLACE TUNNELED CV CATH: CPT | Performed by: RADIOLOGY

## 2024-08-28 PROCEDURE — 76937 US GUIDE VASCULAR ACCESS: CPT | Performed by: RADIOLOGY

## 2024-08-28 PROCEDURE — C1769 GUIDE WIRE: HCPCS

## 2024-08-28 PROCEDURE — 84100 ASSAY OF PHOSPHORUS: CPT | Performed by: INTERNAL MEDICINE

## 2024-08-28 PROCEDURE — 2500000005 HC RX 250 GENERAL PHARMACY W/O HCPCS: Performed by: ANESTHESIOLOGIST ASSISTANT

## 2024-08-28 PROCEDURE — 2500000004 HC RX 250 GENERAL PHARMACY W/ HCPCS (ALT 636 FOR OP/ED): Performed by: INTERNAL MEDICINE

## 2024-08-28 PROCEDURE — 36415 COLL VENOUS BLD VENIPUNCTURE: CPT | Performed by: INTERNAL MEDICINE

## 2024-08-28 PROCEDURE — 2720000007 HC OR 272 NO HCPCS

## 2024-08-28 PROCEDURE — 85027 COMPLETE CBC AUTOMATED: CPT | Performed by: INTERNAL MEDICINE

## 2024-08-28 PROCEDURE — 2500000005 HC RX 250 GENERAL PHARMACY W/O HCPCS: Performed by: INTERNAL MEDICINE

## 2024-08-28 PROCEDURE — C1894 INTRO/SHEATH, NON-LASER: HCPCS

## 2024-08-28 PROCEDURE — 3700000002 HC GENERAL ANESTHESIA TIME - EACH INCREMENTAL 1 MINUTE

## 2024-08-28 PROCEDURE — 2500000001 HC RX 250 WO HCPCS SELF ADMINISTERED DRUGS (ALT 637 FOR MEDICARE OP): Performed by: INTERNAL MEDICINE

## 2024-08-28 PROCEDURE — 7100000002 HC RECOVERY ROOM TIME - EACH INCREMENTAL 1 MINUTE

## 2024-08-28 PROCEDURE — 2780000003 HC OR 278 NO HCPCS

## 2024-08-28 PROCEDURE — 7100000001 HC RECOVERY ROOM TIME - INITIAL BASE CHARGE

## 2024-08-28 PROCEDURE — 8010000001 HC DIALYSIS - HEMODIALYSIS PER DAY

## 2024-08-28 PROCEDURE — 3700000001 HC GENERAL ANESTHESIA TIME - INITIAL BASE CHARGE

## 2024-08-28 PROCEDURE — 2500000004 HC RX 250 GENERAL PHARMACY W/ HCPCS (ALT 636 FOR OP/ED): Performed by: STUDENT IN AN ORGANIZED HEALTH CARE EDUCATION/TRAINING PROGRAM

## 2024-08-28 PROCEDURE — 2500000004 HC RX 250 GENERAL PHARMACY W/ HCPCS (ALT 636 FOR OP/ED): Performed by: ANESTHESIOLOGIST ASSISTANT

## 2024-08-28 PROCEDURE — 76937 US GUIDE VASCULAR ACCESS: CPT

## 2024-08-28 PROCEDURE — 2500000005 HC RX 250 GENERAL PHARMACY W/O HCPCS: Performed by: STUDENT IN AN ORGANIZED HEALTH CARE EDUCATION/TRAINING PROGRAM

## 2024-08-28 PROCEDURE — 6350000001 HC RX 635 EPOETIN >10,000 UNITS: Performed by: INTERNAL MEDICINE

## 2024-08-28 PROCEDURE — 76000 FLUOROSCOPY <1 HR PHYS/QHP: CPT

## 2024-08-28 PROCEDURE — 80202 ASSAY OF VANCOMYCIN: CPT | Performed by: INTERNAL MEDICINE

## 2024-08-28 PROCEDURE — 36597 REPOSITION VENOUS CATHETER: CPT | Performed by: RADIOLOGY

## 2024-08-28 PROCEDURE — 1210000001 HC SEMI-PRIVATE ROOM DAILY

## 2024-08-28 RX ORDER — PROPOFOL 10 MG/ML
INJECTION, EMULSION INTRAVENOUS CONTINUOUS PRN
Status: DISCONTINUED | OUTPATIENT
Start: 2024-08-28 | End: 2024-08-28

## 2024-08-28 RX ORDER — HYDRALAZINE HYDROCHLORIDE 20 MG/ML
5 INJECTION INTRAMUSCULAR; INTRAVENOUS EVERY 30 MIN PRN
Status: ACTIVE | OUTPATIENT
Start: 2024-08-28 | End: 2024-08-28

## 2024-08-28 RX ORDER — ACETAMINOPHEN 160 MG/5ML
650 SOLUTION ORAL EVERY 4 HOURS PRN
Status: DISCONTINUED | OUTPATIENT
Start: 2024-08-28 | End: 2024-08-29 | Stop reason: HOSPADM

## 2024-08-28 RX ORDER — HEPARIN SODIUM 1000 [USP'U]/ML
1000 INJECTION, SOLUTION INTRAVENOUS; SUBCUTANEOUS
Status: DISCONTINUED | OUTPATIENT
Start: 2024-08-28 | End: 2024-08-29 | Stop reason: HOSPADM

## 2024-08-28 RX ORDER — DEXMEDETOMIDINE HYDROCHLORIDE 100 UG/ML
INJECTION, SOLUTION INTRAVENOUS AS NEEDED
Status: DISCONTINUED | OUTPATIENT
Start: 2024-08-28 | End: 2024-08-28

## 2024-08-28 RX ORDER — ALBUTEROL SULFATE 0.83 MG/ML
2.5 SOLUTION RESPIRATORY (INHALATION) ONCE AS NEEDED
Status: ACTIVE | OUTPATIENT
Start: 2024-08-28 | End: 2024-08-28

## 2024-08-28 RX ORDER — ACETAMINOPHEN 650 MG/1
650 SUPPOSITORY RECTAL EVERY 4 HOURS PRN
Status: DISCONTINUED | OUTPATIENT
Start: 2024-08-28 | End: 2024-08-29 | Stop reason: HOSPADM

## 2024-08-28 RX ORDER — SODIUM CHLORIDE, SODIUM LACTATE, POTASSIUM CHLORIDE, CALCIUM CHLORIDE 600; 310; 30; 20 MG/100ML; MG/100ML; MG/100ML; MG/100ML
100 INJECTION, SOLUTION INTRAVENOUS CONTINUOUS
Status: ACTIVE | OUTPATIENT
Start: 2024-08-28 | End: 2024-08-28

## 2024-08-28 RX ORDER — MIDAZOLAM HYDROCHLORIDE 1 MG/ML
INJECTION, SOLUTION INTRAMUSCULAR; INTRAVENOUS AS NEEDED
Status: DISCONTINUED | OUTPATIENT
Start: 2024-08-28 | End: 2024-08-28

## 2024-08-28 RX ORDER — LIDOCAINE HYDROCHLORIDE 10 MG/ML
0.1 INJECTION INFILTRATION; PERINEURAL ONCE
Status: DISCONTINUED | OUTPATIENT
Start: 2024-08-28 | End: 2024-08-29 | Stop reason: HOSPADM

## 2024-08-28 RX ORDER — ACETAMINOPHEN 325 MG/1
650 TABLET ORAL EVERY 4 HOURS PRN
Status: DISCONTINUED | OUTPATIENT
Start: 2024-08-28 | End: 2024-08-29 | Stop reason: HOSPADM

## 2024-08-28 RX ADMIN — CLONIDINE HYDROCHLORIDE 0.1 MG: 0.1 TABLET ORAL at 06:06

## 2024-08-28 RX ADMIN — METOPROLOL TARTRATE 25 MG: 25 TABLET, FILM COATED ORAL at 20:24

## 2024-08-28 RX ADMIN — HYDROMORPHONE HYDROCHLORIDE 0.6 MG: 1 INJECTION, SOLUTION INTRAMUSCULAR; INTRAVENOUS; SUBCUTANEOUS at 04:25

## 2024-08-28 RX ADMIN — OXYCODONE HYDROCHLORIDE 5 MG: 5 TABLET ORAL at 07:26

## 2024-08-28 RX ADMIN — OXYCODONE HYDROCHLORIDE 5 MG: 5 TABLET ORAL at 17:48

## 2024-08-28 RX ADMIN — HEPARIN SODIUM 3800 UNITS: 1000 INJECTION INTRAVENOUS; SUBCUTANEOUS at 11:53

## 2024-08-28 RX ADMIN — METOPROLOL TARTRATE 25 MG: 25 TABLET, FILM COATED ORAL at 08:02

## 2024-08-28 RX ADMIN — ONDANSETRON 4 MG: 4 TABLET, ORALLY DISINTEGRATING ORAL at 13:57

## 2024-08-28 RX ADMIN — DIPHENHYDRAMINE HYDROCHLORIDE 12.5 MG: 50 INJECTION, SOLUTION INTRAMUSCULAR; INTRAVENOUS at 06:07

## 2024-08-28 RX ADMIN — HYDROMORPHONE HYDROCHLORIDE 0.6 MG: 1 INJECTION, SOLUTION INTRAMUSCULAR; INTRAVENOUS; SUBCUTANEOUS at 23:34

## 2024-08-28 RX ADMIN — ATORVASTATIN CALCIUM 40 MG: 40 TABLET, FILM COATED ORAL at 20:25

## 2024-08-28 RX ADMIN — LIDOCAINE HYDROCHLORIDE 5 ML: 10 INJECTION, SOLUTION EPIDURAL; INFILTRATION; INTRACAUDAL; PERINEURAL at 11:27

## 2024-08-28 RX ADMIN — HYDROMORPHONE HYDROCHLORIDE 0.6 MG: 1 INJECTION, SOLUTION INTRAMUSCULAR; INTRAVENOUS; SUBCUTANEOUS at 07:28

## 2024-08-28 RX ADMIN — HYDRALAZINE HYDROCHLORIDE 75 MG: 50 TABLET ORAL at 20:24

## 2024-08-28 RX ADMIN — HEPARIN SODIUM 1900 UNITS: 1000 INJECTION, SOLUTION INTRAVENOUS; SUBCUTANEOUS at 17:37

## 2024-08-28 RX ADMIN — ONDANSETRON 4 MG: 4 TABLET, ORALLY DISINTEGRATING ORAL at 07:26

## 2024-08-28 RX ADMIN — HYDROMORPHONE HYDROCHLORIDE 0.6 MG: 1 INJECTION, SOLUTION INTRAMUSCULAR; INTRAVENOUS; SUBCUTANEOUS at 20:23

## 2024-08-28 RX ADMIN — PREGABALIN 50 MG: 25 CAPSULE ORAL at 08:02

## 2024-08-28 RX ADMIN — HEPARIN SODIUM 1900 UNITS: 1000 INJECTION, SOLUTION INTRAVENOUS; SUBCUTANEOUS at 17:36

## 2024-08-28 RX ADMIN — CALCITRIOL CAPSULES 0.25 MCG 0.5 MCG: 0.25 CAPSULE ORAL at 08:03

## 2024-08-28 RX ADMIN — DIPHENHYDRAMINE HYDROCHLORIDE 12.5 MG: 50 INJECTION, SOLUTION INTRAMUSCULAR; INTRAVENOUS at 20:25

## 2024-08-28 RX ADMIN — HYDROMORPHONE HYDROCHLORIDE 0.6 MG: 1 INJECTION, SOLUTION INTRAMUSCULAR; INTRAVENOUS; SUBCUTANEOUS at 16:38

## 2024-08-28 RX ADMIN — VANCOMYCIN HYDROCHLORIDE 500 MG: 500 INJECTION, POWDER, LYOPHILIZED, FOR SOLUTION INTRAVENOUS at 17:48

## 2024-08-28 RX ADMIN — EPOETIN ALFA-EPBX 6500 UNITS: 20000 INJECTION, SOLUTION INTRAVENOUS; SUBCUTANEOUS at 08:03

## 2024-08-28 RX ADMIN — HYDRALAZINE HYDROCHLORIDE 75 MG: 50 TABLET ORAL at 08:02

## 2024-08-28 RX ADMIN — ACETAMINOPHEN 650 MG: 325 TABLET ORAL at 23:51

## 2024-08-28 RX ADMIN — LORAZEPAM 0.5 MG: 0.5 TABLET ORAL at 07:33

## 2024-08-28 RX ADMIN — OXYCODONE HYDROCHLORIDE 5 MG: 5 TABLET ORAL at 13:57

## 2024-08-28 RX ADMIN — DIPHENHYDRAMINE HYDROCHLORIDE 12.5 MG: 50 INJECTION, SOLUTION INTRAMUSCULAR; INTRAVENOUS at 13:59

## 2024-08-28 RX ADMIN — CLONIDINE HYDROCHLORIDE 0.1 MG: 0.1 TABLET ORAL at 16:38

## 2024-08-28 RX ADMIN — PREGABALIN 50 MG: 25 CAPSULE ORAL at 20:24

## 2024-08-28 RX ADMIN — HYDRALAZINE HYDROCHLORIDE 75 MG: 50 TABLET ORAL at 16:38

## 2024-08-28 RX ADMIN — MIRTAZAPINE 15 MG: 15 TABLET, FILM COATED ORAL at 20:25

## 2024-08-28 SDOH — HEALTH STABILITY: MENTAL HEALTH: CURRENT SMOKER: 0

## 2024-08-28 ASSESSMENT — COGNITIVE AND FUNCTIONAL STATUS - GENERAL
DAILY ACTIVITIY SCORE: 24
DAILY ACTIVITIY SCORE: 24
MOBILITY SCORE: 24
MOBILITY SCORE: 24

## 2024-08-28 ASSESSMENT — PAIN SCALES - GENERAL
PAINLEVEL_OUTOF10: 10 - WORST POSSIBLE PAIN
PAINLEVEL_OUTOF10: 10 - WORST POSSIBLE PAIN
PAINLEVEL_OUTOF10: 9
PAINLEVEL_OUTOF10: 8
PAINLEVEL_OUTOF10: 7
PAINLEVEL_OUTOF10: 7
PAINLEVEL_OUTOF10: 10 - WORST POSSIBLE PAIN
PAINLEVEL_OUTOF10: 10 - WORST POSSIBLE PAIN
PAINLEVEL_OUTOF10: 0 - NO PAIN
PAINLEVEL_OUTOF10: 0 - NO PAIN
PAINLEVEL_OUTOF10: 10 - WORST POSSIBLE PAIN
PAINLEVEL_OUTOF10: 7
PAINLEVEL_OUTOF10: 3
PAINLEVEL_OUTOF10: 8
PAINLEVEL_OUTOF10: 10 - WORST POSSIBLE PAIN

## 2024-08-28 ASSESSMENT — PAIN - FUNCTIONAL ASSESSMENT
PAIN_FUNCTIONAL_ASSESSMENT: 0-10
PAIN_FUNCTIONAL_ASSESSMENT: CPOT (CRITICAL CARE PAIN OBSERVATION TOOL)
PAIN_FUNCTIONAL_ASSESSMENT: 0-10
PAIN_FUNCTIONAL_ASSESSMENT: CPOT (CRITICAL CARE PAIN OBSERVATION TOOL)
PAIN_FUNCTIONAL_ASSESSMENT: 0-10
PAIN_FUNCTIONAL_ASSESSMENT: CPOT (CRITICAL CARE PAIN OBSERVATION TOOL)
PAIN_FUNCTIONAL_ASSESSMENT: 0-10
PAIN_FUNCTIONAL_ASSESSMENT: CPOT (CRITICAL CARE PAIN OBSERVATION TOOL)
PAIN_FUNCTIONAL_ASSESSMENT: 0-10

## 2024-08-28 ASSESSMENT — PAIN DESCRIPTION - ORIENTATION
ORIENTATION: RIGHT;LEFT
ORIENTATION: RIGHT;LEFT
ORIENTATION: LEFT;RIGHT
ORIENTATION: LEFT;RIGHT
ORIENTATION: MID
ORIENTATION: RIGHT;LEFT

## 2024-08-28 ASSESSMENT — PAIN DESCRIPTION - LOCATION
LOCATION: CHEST

## 2024-08-28 ASSESSMENT — PAIN DESCRIPTION - DESCRIPTORS
DESCRIPTORS: DISCOMFORT
DESCRIPTORS: ACHING;SHARP;SORE
DESCRIPTORS: DISCOMFORT
DESCRIPTORS: ACHING;SHARP;SORE
DESCRIPTORS: ACHING;SHARP
DESCRIPTORS: ACHING;HEAVINESS
DESCRIPTORS: ACHING;SHARP;SORE
DESCRIPTORS: ACHING;SHARP
DESCRIPTORS: ACHING;SORE
DESCRIPTORS: ACHING;SHARP
DESCRIPTORS: ACHING;SHARP;SORE

## 2024-08-28 NOTE — PROGRESS NOTES
Batsheva Page is a 48 y.o. female on day 10 of admission presenting with Abscess.      Subjective   Patient is lying on the bed, complaining of generalized pain   No fever chills or rigors.  No other significant overnight events.    Objective     Last Recorded Vitals  /86   Pulse 67   Temp 36 °C (96.8 °F) (Temporal)   Resp 12   Wt 65 kg (143 lb 4.8 oz)   SpO2 98%   Intake/Output last 3 Shifts:    Intake/Output Summary (Last 24 hours) at 8/28/2024 1248  Last data filed at 8/28/2024 1200  Gross per 24 hour   Intake 730 ml   Output 5 ml   Net 725 ml       Admission Weight  Weight: 65 kg (143 lb 4.8 oz) (08/18/24 1910)    Daily Weight  08/20/24 : 65 kg (143 lb 4.8 oz)      Physical Exam  General: No distress.  Neck: Right internal dialysis catheter is in place, tender around the catheter.  HEENT: Normocephalic atraumatic pupils equal acuity  Heart: S1-S2 heard, tachycardic  Lungs: Clear to auscultation bilaterally  Neuro: No focal deficits    Relevant Results  Scheduled medications  atorvastatin, 40 mg, oral, Nightly  calcitriol, 0.5 mcg, oral, Daily  cloNIDine, 0.1 mg, oral, q8h KIMBERLY  epoetin brandy-epbx, 6,500 Units, subcutaneous, Once per day on Monday Wednesday Friday  heparin (porcine), 5,000 Units, subcutaneous, q8h  heparin, 1,000 Units, intra-catheter, After Dialysis  heparin, 1,000 Units, intra-catheter, After Dialysis  hydrALAZINE, 75 mg, oral, TID  levETIRAcetam, 750 mg, oral, Nightly  lidocaine, 0.1 mL, subcutaneous, Once  metoprolol tartrate, 25 mg, oral, BID  mirtazapine, 15 mg, oral, Nightly  pregabalin, 50 mg, oral, BID  [Held by provider] sodium zirconium cyclosilicate, 10 g, oral, Daily  vancomycin, 500 mg, intravenous, Once per day on Monday Wednesday Friday      Continuous medications  lactated Ringer's, 100 mL/hr      PRN medications  PRN medications: acetaminophen **OR** acetaminophen **OR** acetaminophen, albuterol, benzocaine-menthol, dextromethorphan-guaifenesin, diphenhydrAMINE,  guaiFENesin, heparin, hydrALAZINE, HYDROmorphone, HYDROmorphone, HYDROmorphone, lidocaine PF, LORazepam, melatonin, ondansetron ODT **OR** ondansetron, oxyCODONE, oxyCODONE-acetaminophen, oxygen, oxygen, oxygen, oxygen, vancomycin    Results for orders placed or performed during the hospital encounter of 08/18/24 (from the past 24 hour(s))   Lavender Top   Result Value Ref Range    Extra Tube Hold for add-ons.    Vancomycin   Result Value Ref Range    Vancomycin 19.1 10.0 - 20.0 ug/mL           Assessment/Plan   Sepsis.  Infected tunneled dialysis catheter    History of:  End-stage renal disease on hemodialysis Monday Wednesday Friday.  Endocarditis.  Status post aortic and mitral valve replacement.  Seizure disorder.  Hypertension    Plan:  She had dialysis catheter exchange today  She will have hemodialysis today  Infectious disease recommended to continue vancomycin for total two weeks with the dialysis.  Blood pressure is controlled after resuming home medications per  Continue Keppra.  CBC BMP tomorrow    DVT prophylaxis:  Heparin subcu.    Disposition:  Anticipate discharge tomorrow morning

## 2024-08-28 NOTE — CONSULTS
"Nutrition Assessement Note    Nutrition Assessment    Reason for Assessment: Length of stay    Pt is scheduled for TDR line placement today. Pt had left dialysis catheter removed and temporary catheter placed on right arm on 8/20. Temporary catheter was converted into tunneled dialysis catheter on 8/26. Will continue to monitor PO intakes.    Reason for Hospital Admission:  Batsheva Page is a 48 y.o. female who is admitted for Abscess.    Past Medical History:   Diagnosis Date    Aortic valve replaced 2022    CHF (congestive heart failure) (Multi)     Chronic pain     Coronary artery disease     Disease of thyroid gland     ESRD (end stage renal disease) (Multi)     H/O mitral valve replacement 2013    and 2022    Heart disease     History of transfusion     Hypertension     Mitral valve regurgitation     Seizures (Multi)     Stroke (Multi)       Past Surgical History:   Procedure Laterality Date    AORTIC VALVE REPLACEMENT  09/26/2022    bioprosthetic    CORONARY ARTERY BYPASS GRAFT      IR CVC  01/12/2024    exchange    IR CVC  05/07/2024    exchange    IR CVC  08/20/2024    removal    IR CVC TUNNELED  09/09/2022    IR CVC TUNNELED 9/9/2022 [facility]PATIENT LEGACY    IR CVC TUNNELED  12/28/2022    IR CVC TUNNELED 12/28/2022 [facility] INPATIENT LEGAC[facility]    MITRAL VALVE REPAIR  2013    MITRAL VALVE REPLACEMENT  09/26/2022    bioprosthetic    PARATHYROIDECTOMY      TRICUSPID VALVULOPLASTY  2013    US GUIDED PERCUTANEOUS PLACEMENT  07/14/2022    US GUIDED PERCUTANEOUS PLACEMENT [facility]       Nutrition History:     Energy Intake: Good > 75 % (one meal documented at 100% consumed)  Food Allergies/Intolerances:  None      Anthropometrics:  Ht: 167.6 cm (5' 6\"), Wt: 65 kg (143 lb 4.8 oz), BMI: 23.14             Weight Change:  Daily Weight  08/20/24 : 65 kg (143 lb 4.8 oz)  05/07/24 : 66.2 kg (145 lb 15.1 oz)  04/05/24 : 69.8 kg (153 lb 14.1 oz)  02/08/24 : 62 kg (136 lb 11 oz)  01/09/24 : 64.4 kg (141 lb 15.6 " oz)     Weight History / % Weight Change: weight has remained stable since January 2024             Nutrition Focused Physical Exam Findings:                       Nutrition Significant Labs:  Lab Results   Component Value Date    WBC 11.0 08/27/2024    HGB 10.2 (L) 08/27/2024    HCT 34.9 (L) 08/27/2024     08/27/2024    CHOL 168 09/05/2022    TRIG 71 12/31/2022    HDL 35.9 (A) 09/05/2022    ALT 7 08/18/2024    AST 15 08/18/2024     08/27/2024    K 3.9 08/27/2024    CL 99 08/27/2024    CREATININE 10.10 (H) 08/27/2024    BUN 54 (H) 08/27/2024    CO2 13 (L) 08/27/2024    TSH 2.12 04/28/2024    INR 1.4 (H) 04/28/2024    HGBA1C 4.6 12/23/2022     Nutrition Specific Medications:  atorvastatin, 40 mg, oral, Nightly  calcitriol, 0.5 mcg, oral, Daily  cloNIDine, 0.1 mg, oral, q8h KIMBERLY  epoetin brandy-epbx, 6,500 Units, subcutaneous, Once per day on Monday Wednesday Friday  heparin (porcine), 5,000 Units, subcutaneous, q8h  heparin, 1,000 Units, intra-catheter, After Dialysis  heparin, 1,000 Units, intra-catheter, After Dialysis  hydrALAZINE, 75 mg, oral, TID  levETIRAcetam, 750 mg, oral, Nightly  metoprolol tartrate, 25 mg, oral, BID  mirtazapine, 15 mg, oral, Nightly  pregabalin, 50 mg, oral, BID  [Held by provider] sodium zirconium cyclosilicate, 10 g, oral, Daily  vancomycin, 500 mg, intravenous, Once per day on Monday Wednesday Friday        Dietary Orders (From admission, onward)       Start     Ordered    08/28/24 0957  Adult diet Regular, Renal; Potassium Restricted 2 gm (50mEq); 2 - 3 grams Sodium; 1500 mL fluid  Diet effective now        Question Answer Comment   Diet type Regular    Diet type Renal    Potassium restriction: Potassium Restricted 2 gm (50mEq)    Sodium restriction: 2 - 3 grams Sodium    Dietary fluid restriction / 24h: 1500 mL fluid        08/28/24 0958    08/18/24 2341  May Participate in Room Service  Once        Question:  .  Answer:  Yes    08/18/24 2513                     Estimated  Needs:   Estimated Energy Needs  Total Energy Estimated Needs (kCal):  (9956-3658)  Total Estimated Energy Need per Day (kCal/kg):  (30-35)  Method for Estimating Needs: actual wt    Estimated Protein Needs  Total Protein Estimated Needs (g):  (78-85)  Total Protein Estimated Needs (g/kg):  (1.2-1.3)  Method for Estimating Needs: actual wt    Estimated Fluid Needs  Total Fluid Estimated Needs (mL): 1500 mL  Method for Estimating Needs: fluid restriction        Nutrition Diagnosis   Nutrition Diagnosis:       Nutrition Diagnosis  Patient has Nutrition Diagnosis: Yes  Diagnosis Status (1): New  Nutrition Diagnosis 1: Increased nutrient needs  Related to (1): increased demand for nutrient  As Evidenced by (1): hemodialysis       Nutrition Interventions/Recommendations   Nutrition Interventions and Recommendations:    Nutrition Prescription:  Individualized Nutrition Prescription Provided for : 3937-8423 kcals, 78-85 g protein via diet    Nutrition Interventions:   Food and/or Nutrient Delivery Interventions  Interventions: Meals and snacks  Meals and Snacks: Mineral-modified diet  Goal: provide diet as ordered    Education Documentation  No documentation found.           Nutrition Monitoring and Evaluation   Monitoring/Evaluation:   Food/Nutrient Related History Monitoring  Monitoring and Evaluation Plan: Energy intake  Energy Intake: Estimated energy intake  Criteria: pt to consume >/= 75% estimated needs         Time Spent/Follow-up:   Follow Up  Time Spent (min): 30 minutes  Last Date of Nutrition Visit: 08/28/24  Nutrition Follow-Up Needed?: 5-7 days  Follow up Comment: 9/3/24

## 2024-08-28 NOTE — ANESTHESIA PREPROCEDURE EVALUATION
Patient: Batsheva Page    Procedure Information       Date/Time: 08/28/24 1015    Scheduled providers: Marc Rosa MD; Chris Lott MD; DANIELLA Ritter    Procedure: IR CVC EXCHANGE    Location: [facility]          Past Medical History:   Diagnosis Date    Aortic valve replaced 2022    CHF (congestive heart failure) (Multi)     Chronic pain     Coronary artery disease     Disease of thyroid gland     ESRD (end stage renal disease) (Multi)     H/O mitral valve replacement 2013    and 2022    Heart disease     History of transfusion     Hypertension     Mitral valve regurgitation     Seizures (Multi)     Stroke (Multi)         Relevant Problems   Cardiac   (+) Arteriosclerosis of coronary artery bypass graft   (+) Benign essential hypertension   (+) HTN (hypertension)   (+) Paroxysmal atrial fibrillation (Multi)      Neuro   (+) Anxiety   (+) Depression with anxiety   (+) Major depressive disorder, recurrent, severe with psychotic symptoms (Multi)   (+) Seizure (Multi)      /Renal   (+) ESRD (end stage renal disease) (Multi)   (+) End-stage renal disease on hemodialysis (Multi)   (+) Hyponatremia      Hematology   (+) Anemia of chronic disease   (+) Anemia secondary to renal failure   (+) Iron deficiency anemia      ID   (+) MRSA (methicillin resistant Staphylococcus aureus) infection   (+) MRSA bacteremia     Past Surgical History:   Procedure Laterality Date    AORTIC VALVE REPLACEMENT  09/26/2022    bioprosthetic    CORONARY ARTERY BYPASS GRAFT      IR CVC  01/12/2024    exchange    IR CVC  05/07/2024    exchange    IR CVC  08/20/2024    removal    IR CVC TUNNELED  09/09/2022    IR CVC TUNNELED 9/9/2022 [facility] INPATIENT LEGACY    IR CVC TUNNELED  12/28/2022    IR CVC TUNNELED 12/28/2022 [facility] INPATIENT LEGACY    MITRAL VALVE REPAIR  2013    MITRAL VALVE REPLACEMENT  09/26/2022    bioprosthetic    PARATHYROIDECTOMY      TRICUSPID VALVULOPLASTY  2013    US GUIDED PERCUTANEOUS  PLACEMENT  07/14/2022    US GUIDED PERCUTANEOUS PLACEMENT [facility]      Clinical information reviewed:   Tobacco  Allergies  Meds   Med Hx  Surg Hx   Fam Hx  Soc Hx        NPO Detail:  NPO/Void Status  Date of Last Liquid: 08/27/24  Date of Last Solid: 08/27/24         Physical Exam    Airway  Mallampati: II  TM distance: >3 FB  Neck ROM: full     Cardiovascular    Dental    Pulmonary    Abdominal            Anesthesia Plan    History of general anesthesia?: yes  History of complications of general anesthesia?: no    ASA 3     MAC     The patient is not a current smoker.  Patient was not previously instructed to abstain from smoking on day of procedure.  Patient did not smoke on day of procedure.    intravenous induction   Postoperative administration of opioids is intended.  Anesthetic plan and risks discussed with patient.    Plan discussed with attending.

## 2024-08-28 NOTE — ANESTHESIA POSTPROCEDURE EVALUATION
Patient: Batsheva Page    Procedure Summary       Date: 08/28/24 Room / Location: [facility]    Anesthesia Start: 1045 Anesthesia Stop: 1210    Procedure: IR CVC EXCHANGE Diagnosis: (EXCHANGE TDC)    Scheduled Providers: Marc Rosa MD; Chris Lott MD; DANIELLA Ritter Responsible Provider: Marc Rosa MD    Anesthesia Type: MAC ASA Status: 3            Anesthesia Type: MAC    Vitals Value Taken Time   /88 08/28/24 1225   Temp 36 °C (96.8 °F) 08/28/24 1210   Pulse 69 08/28/24 1225   Resp 15 08/28/24 1225   SpO2 96 % 08/28/24 1225       Anesthesia Post Evaluation    Patient location during evaluation: PACU  Patient participation: complete - patient participated  Level of consciousness: awake and alert  Pain management: adequate  Airway patency: patent  Cardiovascular status: acceptable  Respiratory status: acceptable  Hydration status: acceptable  Postoperative Nausea and Vomiting: none        There were no known notable events for this encounter.

## 2024-08-28 NOTE — PROGRESS NOTES
I attempted to see the patient today however she was not in the room, is down having a new tunneled dialysis catheter placed as the prior catheter was nonfunctional.  Dialysis orders in place, she is to have dialysis this afternoon.    Vu Pedersen MD

## 2024-08-28 NOTE — PROGRESS NOTES
Spiritual Care Visit    Clinical Encounter Type  Visited With: Patient not available  Routine Visit: Follow-up  Continue Visiting: Yes     David Peguero

## 2024-08-28 NOTE — PROGRESS NOTES
08/28/24 1501   Discharge Planning   Expected Discharge Disposition Home     New tunneled dialysis catheter placed today.  Currently in dialysis. Anticipate discharge tomorrow.  Will confirm with [facility] tomorrow morning that Vanco order has been received.

## 2024-08-28 NOTE — PRE-PROCEDURE NOTE
Report from Sending RN:    Report From: Alex DAILEY  Recent Surgery of Procedure: TDC line placement schedule at 930 am   Baseline Level of Consciousness (LOC): 4  Oxygen Use: No  Type: room air  Diabetic: No  Last BP Med Given Day of Dialysis: mar   Last Pain Med Given: mar  Lab Tests to be Obtained with Dialysis: No  Blood Transfusion to be Given During Dialysis: No  Available IV Access: Yes  Medications to be Administered During Dialysis: benadryl and pain medication at start of HD   Continuous IV Infusion Running: No  Restraints on Currently or in the Last 24 Hours: No  Hand-Off Communication: stable for hd rn aware to have cathlab place ok to use line when they call report   Dialysis Catheter Dressing: will assess   Last Dressing Change:      Report to Receiving RN:    Report To: Alex Quintero Report Called: [id number]  Hand-Off Communication: mango signed catheter ran well , elevated /102/93  Complications During Treatment: No  Ultrafiltration Treatment: No  Medications Administered During Dialysis: Yes rn medicated   Blood Products Administered During Dialysis: No  Labs Sent During Dialysis: Yes,   Heparin Drip Rate Changes: n/a  Dialysis Catheter Dressing: dry intact   Last Dressing Change: 8/28/24    Electronic Signatures:  Jesenia rizzo     Last Updated: 5:13 PM by JESENIA RIZZO

## 2024-08-28 NOTE — PROGRESS NOTES
Reason for consultation:  Left TDC tunnel site infection     Subjective:  (Sleeping soundly on hemodialysis, not awakened)    Examination:  Afebrile, VSS  Sleeping soundly, undergoing hemodialysis  New tunneled dialysis catheter functioning in the right chest  Decreased cord over the site of the prior tunneled catheter in the left chest     Laboratory:  WBC: 7600     Blood culture (8/19 peripherally): X 2-NGTD  Blood culture (8/19 TDC): X 1-NGTD  Tunnel abscess site culture (8/19): Rare MRSA     Impression:  1.  TDC associated tunnel infection  -- Patient recently had her TDC changed over guidewire on 8/14/2024.  Shortly thereafter she began to develop increasing pain, erythema and more recently gross pus draining from the tunnel site.  IR removed left internal jugular TDC and placed new right temporary internal jugular dialysis catheter on 8/20/24.  Cultures remain sterile and the wound is growing rare MRSA.  Has finally undergone insertion of a new tunneled catheter in the right chest     Plan:  1.  Continue vancomycin with dialysis, suggest continuing this for 2 additional weeks      Adama Soto MD  [facility]  [phone]

## 2024-08-28 NOTE — CARE PLAN
The patient's goals for the shift include      The clinical goals for the shift include pain management and safety    Over the shift, the patient did not make progress toward the following goals. Barriers to progression include . Recommendations to address these barriers include   Problem: Pain - Adult  Goal: Verbalizes/displays adequate comfort level or baseline comfort level  Outcome: Progressing     Problem: Safety - Adult  Goal: Free from fall injury  Outcome: Progressing     Problem: Discharge Planning  Goal: Discharge to home or other facility with appropriate resources  Outcome: Progressing     Problem: Chronic Conditions and Co-morbidities  Goal: Patient's chronic conditions and co-morbidity symptoms are monitored and maintained or improved  Outcome: Progressing     Problem: Fall/Injury  Goal: Not fall by end of shift  Outcome: Progressing  Goal: Be free from injury by end of the shift  Outcome: Progressing  Goal: Verbalize understanding of personal risk factors for fall in the hospital  Outcome: Progressing  Goal: Verbalize understanding of risk factor reduction measures to prevent injury from fall in the home  Outcome: Progressing  Goal: Pace activities to prevent fatigue by end of the shift  Outcome: Progressing     Problem: Pain  Goal: Takes deep breaths with improved pain control throughout the shift  Outcome: Progressing  Goal: Turns in bed with improved pain control throughout the shift  Outcome: Progressing  Goal: Walks with improved pain control throughout the shift  Outcome: Progressing  Goal: Performs ADL's with improved pain control throughout shift  Outcome: Progressing  Goal: Free from opioid side effects throughout the shift  Outcome: Progressing  Goal: Free from acute confusion related to pain meds throughout the shift  Outcome: Progressing   .

## 2024-08-29 VITALS
DIASTOLIC BLOOD PRESSURE: 48 MMHG | WEIGHT: 143.3 LBS | RESPIRATION RATE: 18 BRPM | SYSTOLIC BLOOD PRESSURE: 150 MMHG | HEIGHT: 66 IN | OXYGEN SATURATION: 99 % | TEMPERATURE: 97.7 F | HEART RATE: 68 BPM | BODY MASS INDEX: 23.03 KG/M2

## 2024-08-29 LAB
ALBUMIN SERPL-MCNC: 2.9 G/DL (ref 3.5–5)
ANION GAP SERPL CALC-SCNC: 16 MMOL/L
ATRIAL RATE: 84 BPM
BUN SERPL-MCNC: 32 MG/DL (ref 8–25)
CALCIUM SERPL-MCNC: 7.8 MG/DL (ref 8.5–10.4)
CHLORIDE SERPL-SCNC: 99 MMOL/L (ref 97–107)
CO2 SERPL-SCNC: 22 MMOL/L (ref 24–31)
CREAT SERPL-MCNC: 6.2 MG/DL (ref 0.4–1.6)
EGFRCR SERPLBLD CKD-EPI 2021: 8 ML/MIN/1.73M*2
ERYTHROCYTE [DISTWIDTH] IN BLOOD BY AUTOMATED COUNT: 19 % (ref 11.5–14.5)
GLUCOSE SERPL-MCNC: 111 MG/DL (ref 65–99)
HCT VFR BLD AUTO: 31 % (ref 36–46)
HGB BLD-MCNC: 9.6 G/DL (ref 12–16)
MCH RBC QN AUTO: 27.9 PG (ref 26–34)
MCHC RBC AUTO-ENTMCNC: 31 G/DL (ref 32–36)
MCV RBC AUTO: 90 FL (ref 80–100)
NRBC BLD-RTO: 0 /100 WBCS (ref 0–0)
P AXIS: 58 DEGREES
P OFFSET: 202 MS
P ONSET: 152 MS
PHOSPHATE SERPL-MCNC: 4.5 MG/DL (ref 2.5–4.5)
PLATELET # BLD AUTO: 217 X10*3/UL (ref 150–450)
POTASSIUM SERPL-SCNC: 4.2 MMOL/L (ref 3.4–5.1)
PR INTERVAL: 144 MS
Q ONSET: 224 MS
QRS COUNT: 14 BEATS
QRS DURATION: 64 MS
QT INTERVAL: 364 MS
QTC CALCULATION(BAZETT): 430 MS
QTC FREDERICIA: 407 MS
R AXIS: -4 DEGREES
RBC # BLD AUTO: 3.44 X10*6/UL (ref 4–5.2)
SODIUM SERPL-SCNC: 137 MMOL/L (ref 133–145)
T AXIS: 50 DEGREES
T OFFSET: 406 MS
VENTRICULAR RATE: 84 BPM
WBC # BLD AUTO: 7.7 X10*3/UL (ref 4.4–11.3)

## 2024-08-29 PROCEDURE — 2500000001 HC RX 250 WO HCPCS SELF ADMINISTERED DRUGS (ALT 637 FOR MEDICARE OP): Performed by: INTERNAL MEDICINE

## 2024-08-29 PROCEDURE — 36415 COLL VENOUS BLD VENIPUNCTURE: CPT | Performed by: INTERNAL MEDICINE

## 2024-08-29 PROCEDURE — 85027 COMPLETE CBC AUTOMATED: CPT | Performed by: INTERNAL MEDICINE

## 2024-08-29 PROCEDURE — 80069 RENAL FUNCTION PANEL: CPT | Performed by: INTERNAL MEDICINE

## 2024-08-29 PROCEDURE — 2500000004 HC RX 250 GENERAL PHARMACY W/ HCPCS (ALT 636 FOR OP/ED): Performed by: INTERNAL MEDICINE

## 2024-08-29 PROCEDURE — 99239 HOSP IP/OBS DSCHRG MGMT >30: CPT | Performed by: FAMILY MEDICINE

## 2024-08-29 RX ADMIN — DIPHENHYDRAMINE HYDROCHLORIDE 12.5 MG: 50 INJECTION, SOLUTION INTRAMUSCULAR; INTRAVENOUS at 06:02

## 2024-08-29 RX ADMIN — HYDROMORPHONE HYDROCHLORIDE 0.6 MG: 1 INJECTION, SOLUTION INTRAMUSCULAR; INTRAVENOUS; SUBCUTANEOUS at 09:51

## 2024-08-29 RX ADMIN — PREGABALIN 50 MG: 25 CAPSULE ORAL at 09:50

## 2024-08-29 RX ADMIN — HYDROMORPHONE HYDROCHLORIDE 0.6 MG: 1 INJECTION, SOLUTION INTRAMUSCULAR; INTRAVENOUS; SUBCUTANEOUS at 05:50

## 2024-08-29 RX ADMIN — HYDRALAZINE HYDROCHLORIDE 75 MG: 50 TABLET ORAL at 09:50

## 2024-08-29 RX ADMIN — CLONIDINE HYDROCHLORIDE 0.1 MG: 0.1 TABLET ORAL at 05:50

## 2024-08-29 RX ADMIN — METOPROLOL TARTRATE 25 MG: 25 TABLET, FILM COATED ORAL at 09:50

## 2024-08-29 ASSESSMENT — PAIN DESCRIPTION - DESCRIPTORS
DESCRIPTORS: DISCOMFORT;HEAVINESS
DESCRIPTORS: SHARP
DESCRIPTORS: DISCOMFORT

## 2024-08-29 ASSESSMENT — PAIN SCALES - GENERAL
PAINLEVEL_OUTOF10: 10 - WORST POSSIBLE PAIN
PAINLEVEL_OUTOF10: 0 - NO PAIN
PAINLEVEL_OUTOF10: 0 - NO PAIN
PAINLEVEL_OUTOF10: 3
PAINLEVEL_OUTOF10: 10 - WORST POSSIBLE PAIN
PAINLEVEL_OUTOF10: 7

## 2024-08-29 ASSESSMENT — PAIN DESCRIPTION - LOCATION
LOCATION: CHEST
LOCATION: SHOULDER

## 2024-08-29 ASSESSMENT — PAIN - FUNCTIONAL ASSESSMENT
PAIN_FUNCTIONAL_ASSESSMENT: 0-10
PAIN_FUNCTIONAL_ASSESSMENT: FLACC (FACE, LEGS, ACTIVITY, CRY, CONSOLABILITY)
PAIN_FUNCTIONAL_ASSESSMENT: 0-10
PAIN_FUNCTIONAL_ASSESSMENT: 0-10

## 2024-08-29 ASSESSMENT — PAIN DESCRIPTION - ORIENTATION
ORIENTATION: RIGHT
ORIENTATION: RIGHT;LEFT

## 2024-08-29 NOTE — PROGRESS NOTES
Batsheva Page is a 48 y.o. female on day 11 of admission presenting with Abscess.      Subjective   Patient is doing well today, her new dialysis catheter worked yesterday and she is being discharged today.       Objective          Vitals 24HR  Heart Rate:  []   Temp:  [36 °C (96.8 °F)-38.7 °C (101.7 °F)]   Resp:  [11-22]   BP: (111-175)/(48-89)   SpO2:  [90 %-100 %]       Intake/Output last 3 Shifts:    Intake/Output Summary (Last 24 hours) at 8/29/2024 1132  Last data filed at 8/28/2024 1920  Gross per 24 hour   Intake 1390 ml   Output 1505 ml   Net -115 ml       Physical Exam  Constitutional:       General: She is awake. She is not in acute distress.  Neck:      Comments: Tunneled dialysis catheter present  Cardiovascular:      Rate and Rhythm: Regular rhythm.      Heart sounds:      No friction rub.   Pulmonary:      Effort: Pulmonary effort is normal.      Breath sounds: Normal breath sounds.   Abdominal:      General: Bowel sounds are normal.      Palpations: Abdomen is soft.      Tenderness: There is no guarding or rebound.   Musculoskeletal:      Right lower leg: No edema.      Left lower leg: No edema.   Neurological:      Mental Status: She is alert.         Relevant Results  Results for orders placed or performed during the hospital encounter of 08/18/24 (from the past 24 hour(s))   Renal Function Panel   Result Value Ref Range    Glucose 97 65 - 99 mg/dL    Sodium 135 133 - 145 mmol/L    Potassium 4.3 3.4 - 5.1 mmol/L    Chloride 98 97 - 107 mmol/L    Bicarbonate 20 (L) 24 - 31 mmol/L    Urea Nitrogen 53 (H) 8 - 25 mg/dL    Creatinine 9.20 (H) 0.40 - 1.60 mg/dL    eGFR 5 (L) >60 mL/min/1.73m*2    Calcium 7.8 (L) 8.5 - 10.4 mg/dL    Phosphorus 5.9 (H) 2.5 - 4.5 mg/dL    Albumin 3.2 (L) 3.5 - 5.0 g/dL    Anion Gap 17 <=19 mmol/L   CBC   Result Value Ref Range    WBC 7.6 4.4 - 11.3 x10*3/uL    nRBC 0.0 0.0 - 0.0 /100 WBCs    RBC 3.42 (L) 4.00 - 5.20 x10*6/uL    Hemoglobin 9.6 (L) 12.0 - 16.0  g/dL    Hematocrit 30.8 (L) 36.0 - 46.0 %    MCV 90 80 - 100 fL    MCH 28.1 26.0 - 34.0 pg    MCHC 31.2 (L) 32.0 - 36.0 g/dL    RDW 18.8 (H) 11.5 - 14.5 %    Platelets 224 150 - 450 x10*3/uL   Renal Function Panel   Result Value Ref Range    Glucose 111 (H) 65 - 99 mg/dL    Sodium 137 133 - 145 mmol/L    Potassium 4.2 3.4 - 5.1 mmol/L    Chloride 99 97 - 107 mmol/L    Bicarbonate 22 (L) 24 - 31 mmol/L    Urea Nitrogen 32 (H) 8 - 25 mg/dL    Creatinine 6.20 (H) 0.40 - 1.60 mg/dL    eGFR 8 (L) >60 mL/min/1.73m*2    Calcium 7.8 (L) 8.5 - 10.4 mg/dL    Phosphorus 4.5 2.5 - 4.5 mg/dL    Albumin 2.9 (L) 3.5 - 5.0 g/dL    Anion Gap 16 <=19 mmol/L   CBC   Result Value Ref Range    WBC 7.7 4.4 - 11.3 x10*3/uL    nRBC 0.0 0.0 - 0.0 /100 WBCs    RBC 3.44 (L) 4.00 - 5.20 x10*6/uL    Hemoglobin 9.6 (L) 12.0 - 16.0 g/dL    Hematocrit 31.0 (L) 36.0 - 46.0 %    MCV 90 80 - 100 fL    MCH 27.9 26.0 - 34.0 pg    MCHC 31.0 (L) 32.0 - 36.0 g/dL    RDW 19.0 (H) 11.5 - 14.5 %    Platelets 217 150 - 450 x10*3/uL            Assessment/Plan   End-stage kidney disease -underwent dialysis yesterday after new tunneled catheter placement and she is being discharged today, to go back to her regular Monday Wednesday Friday schedule.  Infected dialysis catheter with abscess formation - continue antibiotic therapy per infectious disease.  I did call in the IV vancomycin 500 mg with each dialysis to her outpatient dialysis facility [facility] for 2 additional weeks per Dr. 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

## 2024-08-29 NOTE — PROGRESS NOTES
08/29/24 1831   Discharge Planning   Expected Discharge Disposition Home     Confirmed with Sylvia at the [facility] that [facility] has received Vanco orders. Patient receives dialysis M-W-F at the [facility].

## 2024-08-29 NOTE — PROGRESS NOTES
Reason for consultation:  Left TDC tunnel site infection     Subjective:  Feels better and is anticipating discharge today  Decreasing tenderness over the track from the prior left-sided tunneled dialysis catheter    Examination:  Afebrile, VSS  Nontoxic, no distress  New tunneled dialysis catheter functioning in the right chest  Decreased cord and tenderness over the site of the prior tunneled catheter in the left chest, no drainage     Laboratory:  WBC: 7700     Blood culture (8/19 peripherally): X 2-NGTD  Blood culture (8/19 TDC): X 1-NGTD  Tunnel abscess site culture (8/19): Rare MRSA     Impression:  1.  TDC associated tunnel infection  -- Patient recently had her TDC changed over guidewire on 8/14/2024.  Shortly thereafter she began to develop increasing pain, erythema and more recently gross pus draining from the tunnel site.  IR removed left internal jugular TDC and placed new right temporary internal jugular dialysis catheter on 8/20/24.  Cultures remain sterile and the wound is growing rare MRSA.  Has finally undergone insertion of a new tunneled catheter in the right chest, and appears ready for discharge     Plan:  Continue vancomycin with dialysis, suggest continuing this for 2 additional weeks     No further suggestions  Follow-up with me prn    WILL SIGN OFF.  PLEASE RE-CONSULT PRN.  THANK YOU.     Adama Soto MD  [facility]  [phone]

## 2024-08-29 NOTE — CARE PLAN
The patient's goals for the shift include      The clinical goals for the shift include pain management    Over the shift, the patient did not make progress toward the following goals. Barriers to progression include . Recommendations to address these barriers include   Problem: Pain - Adult  Goal: Verbalizes/displays adequate comfort level or baseline comfort level  Outcome: Progressing     Problem: Safety - Adult  Goal: Free from fall injury  Outcome: Progressing     Problem: Discharge Planning  Goal: Discharge to home or other facility with appropriate resources  Outcome: Progressing     Problem: Chronic Conditions and Co-morbidities  Goal: Patient's chronic conditions and co-morbidity symptoms are monitored and maintained or improved  Outcome: Progressing     Problem: Fall/Injury  Goal: Not fall by end of shift  Outcome: Progressing  Goal: Be free from injury by end of the shift  Outcome: Progressing  Goal: Verbalize understanding of personal risk factors for fall in the hospital  Outcome: Progressing  Goal: Verbalize understanding of risk factor reduction measures to prevent injury from fall in the home  Outcome: Progressing  Goal: Pace activities to prevent fatigue by end of the shift  Outcome: Progressing     Problem: Pain  Goal: Takes deep breaths with improved pain control throughout the shift  Outcome: Progressing  Goal: Turns in bed with improved pain control throughout the shift  Outcome: Progressing  Goal: Walks with improved pain control throughout the shift  Outcome: Progressing  Goal: Performs ADL's with improved pain control throughout shift  Outcome: Progressing  Goal: Free from opioid side effects throughout the shift  Outcome: Progressing  Goal: Free from acute confusion related to pain meds throughout the shift  Outcome: Progressing     Problem: Nutrition  Goal: Oral intake greater 75%  Outcome: Progressing   .

## 2024-08-29 NOTE — DISCHARGE SUMMARY
Discharge Diagnosis  End-stage renal disease on hemodialysis Monday Wednesday Friday.  TDC associated tunnel infection   Status post aortic and mitral valve replacement.  Seizure disorder.  Hypertension    Discharge Meds     Your medication list        CONTINUE taking these medications        Instructions Last Dose Given Next Dose Due   acetaminophen 325 mg tablet  Commonly known as: Tylenol      Take 2 tablets (650 mg) by mouth every 6 hours as needed for mild pain (1 - 3).       aspirin 81 mg EC tablet      Take 1 tablet (81 mg) by mouth once daily.       atorvastatin 40 mg tablet  Commonly known as: Lipitor           calcitriol 0.25 mcg capsule  Commonly known as: Rocaltrol           cloNIDine 0.1 mg tablet  Commonly known as: Catapres      Take 1 tablet (0.1 mg) by mouth every 8 hours.       epoetin brandy-epbx 20,000 unit/mL solution  Commonly known as: RETACRIT      Inject 6,440 Units under the skin 3 times a week. Do not start before January 17, 2024.       ergocalciferol 1.25 MG (24360 UT) capsule  Commonly known as: Vitamin D-2           hydrALAZINE 50 mg tablet  Commonly known as: Apresoline           levETIRAcetam 500 mg tablet  Commonly known as: Keppra           metoprolol tartrate 25 mg tablet  Commonly known as: Lopressor      Take 1 tablet (25 mg) by mouth 2 times a day.       mirtazapine 15 mg tablet  Commonly known as: Remeron      Take 1 tablet (15 mg) by mouth once daily at bedtime.       oxyCODONE-acetaminophen 5-325 mg tablet  Commonly known as: Percocet      Take 1 tablet by mouth every 6 hours as needed for moderate pain (4 - 6).       pregabalin 25 mg capsule  Commonly known as: Lyrica      Take 2 capsules (50 mg) by mouth 2 times a day.       promethazine 25 mg tablet  Commonly known as: Phenergan                    Test Results Pending At Discharge  Pending Labs       No current pending labs.            Hospital Course  Batsheva PABLO Depparishwiadriel is a 48 y.o. female presenting with left-sided  tunneled HD line pain.  Patient has a history of multiple episodes of sepsis, most recently earlier this spring, in fact being in septic shock requiring ICU care; blood cultures from April 27 grew MRSA at this point.  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  On August 14, the patient had her TDC changed over guidewire on 8/14/2024.  Shortly thereafter she began to develop increasing pain, erythema and more recently gross pus draining from the tunnel site.  IR removed left internal jugular TDC and placed new right temporary internal jugular dialysis catheter on 8/20/24.  Cultures remain sterile and the wound is growing rare MRSA.  Has finally undergone insertion of a new tunneled catheter in the right chest, and appears ready for discharge.  She will be receiving vancomycin with dialysis sessions for 2 more weeks.  She is discharged in a stable condition with close follow-up with nephrology    Pertinent Physical Exam At Time of Discharge  Physical Exam  General: No distress.  Neck: Right internal dialysis catheter is in place, tender around the catheter.  HEENT: Normocephalic atraumatic pupils equal acuity  Heart: S1-S2 heard, tachycardic  Lungs: Clear to auscultation bilaterally  Neuro: No focal deficits    Total time spent on discharge was 35 minutes    Andrew Jalloh MD

## 2024-08-29 NOTE — CARE PLAN
The patient's goals for the shift include  discharge during shift    The clinical goals for the shift include pain control

## 2024-08-30 ENCOUNTER — PATIENT OUTREACH (OUTPATIENT)
Dept: CARE COORDINATION | Facility: CLINIC | Age: 49
End: 2024-08-30
Payer: MEDICARE

## 2024-08-30 NOTE — PROGRESS NOTES
[facility]  Admitted 8/18/2024  Discharged 8/29/2024  Dx: Tunneled Dialysis Catheter infection, ESRD-HD-MWF  S/P: removal of TDC  S/p : new right side chest unneled Dialysis Catheter placed    Outreach call to patient to support a smooth transition of care from recent admission.  Spoke with daughter Xiao, Batsheva is not available. CM advised calling to check in on Batsheva to see how things are going. Left message my contact information. Will continue to monitor through transition period.    Demetria Martinez RN/CM   [facility]  [phone]

## 2024-09-19 ENCOUNTER — PRE-ADMISSION TESTING (OUTPATIENT)
Dept: PREADMISSION TESTING | Facility: HOSPITAL | Age: 49
End: 2024-09-19
Payer: MEDICARE

## 2024-09-19 ENCOUNTER — TELEPHONE (OUTPATIENT)
Dept: CARDIOLOGY | Facility: HOSPITAL | Age: 49
End: 2024-09-19

## 2024-09-19 ENCOUNTER — PATIENT OUTREACH (OUTPATIENT)
Dept: CARE COORDINATION | Facility: CLINIC | Age: 49
End: 2024-09-19

## 2024-09-19 VITALS
SYSTOLIC BLOOD PRESSURE: 134 MMHG | HEART RATE: 70 BPM | BODY MASS INDEX: 25.05 KG/M2 | TEMPERATURE: 98.6 F | WEIGHT: 155.9 LBS | OXYGEN SATURATION: 98 % | HEIGHT: 66 IN | DIASTOLIC BLOOD PRESSURE: 92 MMHG

## 2024-09-19 PROCEDURE — 99203 OFFICE O/P NEW LOW 30 MIN: CPT

## 2024-09-19 ASSESSMENT — DUKE ACTIVITY SCORE INDEX (DASI)
DASI METS SCORE: 4.7
CAN YOU HAVE SEXUAL RELATIONS: YES
CAN YOU DO HEAVY WORK AROUND THE HOUSE LIKE SCRUBBING FLOORS OR LIFTING AND MOVING HEAVY FURNITURE: NO
CAN YOU TAKE CARE OF YOURSELF (EAT, DRESS, BATHE, OR USE TOILET): YES
TOTAL_SCORE: 15.95
CAN YOU PARTICIPATE IN STRENOUS SPORTS LIKE SWIMMING, SINGLES TENNIS, FOOTBALL, BASKETBALL, OR SKIING: NO
CAN YOU PARTICIPATE IN MODERATE RECREATIONAL ACTIVITIES LIKE GOLF, BOWLING, DANCING, DOUBLES TENNIS OR THROWING A BASEBALL OR FOOTBALL: NO
CAN YOU WALK INDOORS, SUCH AS AROUND YOUR HOUSE: YES
CAN YOU WALK A BLOCK OR TWO ON LEVEL GROUND: NO
CAN YOU CLIMB A FLIGHT OF STAIRS OR WALK UP A HILL: NO
CAN YOU RUN A SHORT DISTANCE: NO
CAN YOU DO YARD WORK LIKE RAKING LEAVES, WEEDING OR PUSHING A MOWER: NO
CAN YOU DO MODERATE WORK AROUND THE HOUSE LIKE VACUUMING, SWEEPING FLOORS OR CARRYING GROCERIES: YES
CAN YOU DO LIGHT WORK AROUND THE HOUSE LIKE DUSTING OR WASHING DISHES: YES

## 2024-09-19 ASSESSMENT — PAIN SCALES - GENERAL: PAINLEVEL_OUTOF10: 0 - NO PAIN

## 2024-09-19 ASSESSMENT — ENCOUNTER SYMPTOMS
ALLERGIC/IMMUNOLOGIC NEGATIVE: 1
ENDOCRINE NEGATIVE: 1
FATIGUE: 1
MUSCULOSKELETAL NEGATIVE: 1
EYES NEGATIVE: 1
PSYCHIATRIC NEGATIVE: 1
RESPIRATORY NEGATIVE: 1
HEMATOLOGIC/LYMPHATIC NEGATIVE: 1
NEUROLOGICAL NEGATIVE: 1
ABDOMINAL DISTENTION: 1

## 2024-09-19 ASSESSMENT — PAIN - FUNCTIONAL ASSESSMENT: PAIN_FUNCTIONAL_ASSESSMENT: 0-10

## 2024-09-19 NOTE — CPM/PAT H&P
CPM/PAT Evaluation       Name: Batsheva Page (Batsheva Page)  /Age: 1975/48 y.o.     In-Person       Chief Complaint: ESRD    HPI: Batsheva Page is a 48 year old female with an extensive past medical history. She has ESRD and has been on dialysis for 20 years. She has a history of two failed fistulas. She was recently admitted 3 weeks ago for an infected tunneled dialysis catheter. She states her dialysis days are Mon, Wed, Fri. She states she received her last dose of antibiotics last Friday at dialysis and states Friday was her last dialysis. She is scheduled for a CVC exchange 24 and the plan is for her to have dialysis after. She denies fever, chills, nausea, vomiting, chest pain, sob, dizziness, or palpitations.    Past Medical History:   Diagnosis Date    Aortic valve replaced     CHF (congestive heart failure) (Multi)     Chronic pain     Coronary artery disease     Disease of thyroid gland     ESRD (end stage renal disease) (Multi)     H/O mitral valve replacement     and     Heart disease     History of transfusion     Hypertension     Mitral valve regurgitation     Seizures (Multi)     Stroke (Multi)        Past Surgical History:   Procedure Laterality Date    AORTIC VALVE REPLACEMENT  2022    bioprosthetic    CORONARY ARTERY BYPASS GRAFT      IR CVC  2024    exchange    IR CVC  2024    exchange    IR CVC  2024    removal    IR CVC TUNNELED  2022    IR CVC TUNNELED 2022 [facility]    IR CVC TUNNELED  2022    IR CVC TUNNELED 2022 [facility]    MITRAL VALVE REPAIR  2013    MITRAL VALVE REPLACEMENT  2022    bioprosthetic    PARATHYROIDECTOMY      TRICUSPID VALVULOPLASTY  2013    US GUIDED PERCUTANEOUS PLACEMENT  2022    US GUIDED PERCUTANEOUS PLACEMENT [facility]       Social History     Tobacco Use    Smoking status: Never    Smokeless tobacco: Never   Substance Use Topics     "Alcohol use: Never       Social History     Substance and Sexual Activity   Drug Use Never         Family History   Problem Relation Name Age of Onset    No Known Problems Mother      No Known Problems Father         Allergies   Allergen Reactions    Kayexalate Seizure    Metoclopramide Hcl Other     anxious, agitated, \"skin crawl    Prochlorperazine Other     anxious, agitated, \"skin crawl    Zofran [Ondansetron Hcl] Headache    Ondansetron Other and Headache       Current Outpatient Medications   Medication Sig Dispense Refill    acetaminophen (Tylenol) 325 mg tablet Take 2 tablets (650 mg) by mouth every 6 hours as needed for mild pain (1 - 3). 30 tablet 0    aspirin 81 mg EC tablet Take 1 tablet (81 mg) by mouth once daily. 30 tablet 2    atorvastatin (Lipitor) 40 mg tablet Take 1 tablet (40 mg) by mouth once daily.      calcitriol (Rocaltrol) 0.25 mcg capsule Take 2 capsules (0.5 mcg) by mouth once daily.      cloNIDine (Catapres) 0.1 mg tablet Take 1 tablet (0.1 mg) by mouth every 8 hours. 90 tablet 3    epoetin brandy-epbx (RETACRIT) 20,000 unit/mL solution Inject 6,440 Units under the skin 3 times a week. Do not start before January 17, 2024. 30 mL 2    ergocalciferol (Vitamin D-2) 1.25 MG (82897 UT) capsule Take 1 capsule (1,250 mcg) by mouth 1 (one) time per week.      hydrALAZINE (Apresoline) 50 mg tablet Take 1 tablet (50 mg) by mouth 3 times a day.      levETIRAcetam (Keppra) 500 mg tablet Take 1.5 tablets (750 mg) by mouth once daily at bedtime.      metoprolol tartrate (Lopressor) 25 mg tablet Take 1 tablet (25 mg) by mouth 2 times a day. 60 tablet 0    mirtazapine (Remeron) 15 mg tablet Take 1 tablet (15 mg) by mouth once daily at bedtime. (Patient not taking: Reported on 9/19/2024) 30 tablet 0    oxyCODONE-acetaminophen (Percocet) 5-325 mg tablet Take 1 tablet by mouth every 6 hours as needed for moderate pain (4 - 6). 5 tablet 0    pregabalin (Lyrica) 25 mg capsule Take 2 capsules (50 mg) by mouth 2 " times a day. 120 capsule 0    promethazine (Phenergan) 25 mg tablet Take 1 tablet (25 mg) by mouth once daily as needed.       No current facility-administered medications for this visit.         Review of Systems   Constitutional:  Positive for fatigue.   HENT: Negative.     Eyes: Negative.    Respiratory: Negative.     Cardiovascular:  Positive for leg swelling.   Gastrointestinal:  Positive for abdominal distention.   Endocrine: Negative.    Genitourinary:         ESRD   Musculoskeletal: Negative.    Skin: Negative.    Allergic/Immunologic: Negative.    Neurological: Negative.    Hematological: Negative.    Psychiatric/Behavioral: Negative.                Physical Exam  Vitals reviewed.   Constitutional:       Appearance: She is ill-appearing.   HENT:      Head: Normocephalic and atraumatic.      Nose: Nose normal.      Mouth/Throat:      Mouth: Mucous membranes are moist.      Pharynx: Oropharynx is clear.   Eyes:      Extraocular Movements: Extraocular movements intact.      Conjunctiva/sclera: Conjunctivae normal.   Cardiovascular:      Rate and Rhythm: Normal rate and regular rhythm.      Pulses: Normal pulses.      Heart sounds: Normal heart sounds.   Pulmonary:      Effort: Pulmonary effort is normal.      Breath sounds: Normal breath sounds.   Abdominal:      General: Bowel sounds are normal.      Palpations: Abdomen is soft.   Genitourinary:     Comments: Assessment referred to physician  Musculoskeletal:         General: Swelling present. Normal range of motion.      Cervical back: Normal range of motion and neck supple.      Right lower leg: Edema present.      Left lower leg: Edema present.   Skin:     General: Skin is warm and dry.   Neurological:      General: No focal deficit present.      Mental Status: She is alert and oriented to person, place, and time.   Psychiatric:         Mood and Affect: Mood normal.         Behavior: Behavior normal.         Thought Content: Thought content normal.          "Judgment: Judgment normal.          PAT AIRWAY:   Airway:     Mallampati::  III    TM distance::  >3 FB    Neck ROM::  Full   implants    BP (!) 134/92   Pulse 70   Temp 37 °C (98.6 °F) (Temporal)   Ht 1.676 m (5' 6\")   Wt 70.7 kg (155 lb 14.4 oz)   SpO2 98%   BMI 25.16 kg/m²     ASA: III  HAYDEE: 8.5%  RCRI: 6.6%      DASI Risk Score      Flowsheet Row Most Recent Value   DASI SCORE 15.95   METS Score (Will be calculated only when all the questions are answered) 4.7          Caprini DVT Assessment    No data to display       Modified Frailty Index    No data to display       CHADS2 Stroke Risk  Current as of 13 minutes ago        5.9% 3 to 100%: High Risk   2 to < 3%: Medium Risk   0 to < 2%: Low Risk     Last Change:           This score determines the patient's risk of having a stroke if the patient has atrial fibrillation.          Points Metrics   0 Has Congestive Heart Failure:  No     Patients with congestive heart failure get 1 point.    Current as of 13 minutes ago   1 Has Hypertension:  Yes     Patients with hypertension get 1 point.    Current as of 13 minutes ago   0 Age:  48     Patients who are 75 years of age or older get 1 point.    Current as of 13 minutes ago   0 Has Diabetes:  No     Patients with diabetes get 1 point.    Current as of 13 minutes ago   2 Had Stroke:  Yes  Had TIA:  No  Had Thromboembolism:  No     Patients who have had a stroke, TIA, or thromboembolism get 2 points.    Current as of 13 minutes ago             Revised Cardiac Risk Index      Flowsheet Row Most Recent Value   Revised Cardiac Risk Calculator 2          Apfel Simplified Score    No data to display       Risk Analysis Index Results This Encounter    No data found in the last 1 encounters.       Stop Bang Score      Flowsheet Row Most Recent Value   Do you snore loudly? 0   Do you often feel tired or fatigued after your sleep? 0   Has anyone ever observed you stop breathing in your sleep? 0   Do you have or are you " being treated for high blood pressure? 1   Recent BMI (Calculated) 25.2   Is BMI greater than 35 kg/m2? 0=No   Age older than 50 years old? 0=No   Is your neck circumference greater than 17 inches (Male) or 16 inches (Female)? 0   Gender - Male 0=No   STOP-BANG Total Score 1            Assessment and Plan:     ESRD: Dialysis   CAD: hx of mitral and aortic valve replacement  HTN  Hx of Stroke  CHF      Paty Hinds, APRN-CNP

## 2024-09-19 NOTE — PREPROCEDURE INSTRUCTIONS
Medication List            Accurate as of September 19, 2024  7:31 AM. Always use your most recent med list.                acetaminophen 325 mg tablet  Commonly known as: Tylenol  Take 2 tablets (650 mg) by mouth every 6 hours as needed for mild pain (1 - 3).     aspirin 81 mg EC tablet  Take 1 tablet (81 mg) by mouth once daily.     atorvastatin 40 mg tablet  Commonly known as: Lipitor     calcitriol 0.25 mcg capsule  Commonly known as: Rocaltrol     cloNIDine 0.1 mg tablet  Commonly known as: Catapres  Take 1 tablet (0.1 mg) by mouth every 8 hours.     epoetin brandy-epbx 20,000 unit/mL solution  Commonly known as: RETACRIT  Inject 6,440 Units under the skin 3 times a week. Do not start before January 17, 2024.     ergocalciferol 1.25 MG (24462 UT) capsule  Commonly known as: Vitamin D-2     hydrALAZINE 50 mg tablet  Commonly known as: Apresoline     levETIRAcetam 500 mg tablet  Commonly known as: Keppra     metoprolol tartrate 25 mg tablet  Commonly known as: Lopressor  Take 1 tablet (25 mg) by mouth 2 times a day.     mirtazapine 15 mg tablet  Commonly known as: Remeron  Take 1 tablet (15 mg) by mouth once daily at bedtime.     oxyCODONE-acetaminophen 5-325 mg tablet  Commonly known as: Percocet  Take 1 tablet by mouth every 6 hours as needed for moderate pain (4 - 6).     pregabalin 25 mg capsule  Commonly known as: Lyrica  Take 2 capsules (50 mg) by mouth 2 times a day.     promethazine 25 mg tablet  Commonly known as: Phenergan                              NPO Instructions:    Do not eat any food after midnight the night before your surgery/procedure.    Additional Instructions:     Day of Surgery:  Review your medication instructions, take indicated medications  Wear  comfortable loose fitting clothing  Do not use moisturizers, creams, lotions or perfume  All jewelry and valuables should be left at home          Why must I stop eating and drinking near surgery time?  With sedation, food or liquid in your  stomach can enter your lungs causing serious complications  Increases nausea and vomiting    When do I need to stop eating and drinking before my surgery?   Do not eat or drink after midnight the night before your surgery/procedure.  You may have small sips of water to take your medication.      FOLLOW ALL INSTRUCTIONS PROVIDED BY INTERVENTIONAL RADIOLOGY      CALL HEART AND VASCULAR DEPARTMENT 2ND FLOOR WITH ANY QUESTIONS OR CONCERNS  [phone]

## 2024-09-19 NOTE — PROGRESS NOTES
Outreach call to patient to check in after hospital discharge to support smooth transition of care. Patient states, she is doing good. She completed the IV Vancomycin. Patient states, she is scheduled to get her dialysis cath placed tomorrow. Patient with no questions at this time.  Will continue to follow.    .    Demetria Martinez RN/CM  [facility]  [phone]

## 2024-09-19 NOTE — H&P (VIEW-ONLY)
CPM/PAT Evaluation       Name: Batsheva Page (Batsheva Page)  /Age: 1975/48 y.o.     In-Person       Chief Complaint: ESRD    HPI: Batsheva Page is a 48 year old female with an extensive past medical history. She has ESRD and has been on dialysis for 20 years. She has a history of two failed fistulas. She was recently admitted 3 weeks ago for an infected tunneled dialysis catheter. She states her dialysis days are Mon, Wed, Fri. She states she received her last dose of antibiotics last Friday at dialysis and states Friday was her last dialysis. She is scheduled for a CVC exchange 24 and the plan is for her to have dialysis after. She denies fever, chills, nausea, vomiting, chest pain, sob, dizziness, or palpitations.    Past Medical History:   Diagnosis Date    Aortic valve replaced     CHF (congestive heart failure) (Multi)     Chronic pain     Coronary artery disease     Disease of thyroid gland     ESRD (end stage renal disease) (Multi)     H/O mitral valve replacement     and     Heart disease     History of transfusion     Hypertension     Mitral valve regurgitation     Seizures (Multi)     Stroke (Multi)        Past Surgical History:   Procedure Laterality Date    AORTIC VALVE REPLACEMENT  2022    bioprosthetic    CORONARY ARTERY BYPASS GRAFT      IR CVC  2024    exchange    IR CVC  2024    exchange    IR CVC  2024    removal    IR CVC TUNNELED  2022    IR CVC TUNNELED 2022 [facility] [place] [place] INPATIENT LEGACY    IR CVC TUNNELED  2022    IR CVC TUNNELED 2022 [facility] [place] [place] INPATIENT LEGACY    MITRAL VALVE REPAIR  2013    MITRAL VALVE REPLACEMENT  2022    bioprosthetic    PARATHYROIDECTOMY      TRICUSPID VALVULOPLASTY  2013    US GUIDED PERCUTANEOUS PLACEMENT  2022    US GUIDED PERCUTANEOUS PLACEMENT [facility]       Social History     Tobacco Use    Smoking status: Never    Smokeless tobacco: Never   Substance Use Topics     "Alcohol use: Never       Social History     Substance and Sexual Activity   Drug Use Never         Family History   Problem Relation Name Age of Onset    No Known Problems Mother      No Known Problems Father         Allergies   Allergen Reactions    Kayexalate Seizure    Metoclopramide Hcl Other     anxious, agitated, \"skin crawl    Prochlorperazine Other     anxious, agitated, \"skin crawl    Zofran [Ondansetron Hcl] Headache    Ondansetron Other and Headache       Current Outpatient Medications   Medication Sig Dispense Refill    acetaminophen (Tylenol) 325 mg tablet Take 2 tablets (650 mg) by mouth every 6 hours as needed for mild pain (1 - 3). 30 tablet 0    aspirin 81 mg EC tablet Take 1 tablet (81 mg) by mouth once daily. 30 tablet 2    atorvastatin (Lipitor) 40 mg tablet Take 1 tablet (40 mg) by mouth once daily.      calcitriol (Rocaltrol) 0.25 mcg capsule Take 2 capsules (0.5 mcg) by mouth once daily.      cloNIDine (Catapres) 0.1 mg tablet Take 1 tablet (0.1 mg) by mouth every 8 hours. 90 tablet 3    epoetin brandy-epbx (RETACRIT) 20,000 unit/mL solution Inject 6,440 Units under the skin 3 times a week. Do not start before January 17, 2024. 30 mL 2    ergocalciferol (Vitamin D-2) 1.25 MG (70522 UT) capsule Take 1 capsule (1,250 mcg) by mouth 1 (one) time per week.      hydrALAZINE (Apresoline) 50 mg tablet Take 1 tablet (50 mg) by mouth 3 times a day.      levETIRAcetam (Keppra) 500 mg tablet Take 1.5 tablets (750 mg) by mouth once daily at bedtime.      metoprolol tartrate (Lopressor) 25 mg tablet Take 1 tablet (25 mg) by mouth 2 times a day. 60 tablet 0    mirtazapine (Remeron) 15 mg tablet Take 1 tablet (15 mg) by mouth once daily at bedtime. (Patient not taking: Reported on 9/19/2024) 30 tablet 0    oxyCODONE-acetaminophen (Percocet) 5-325 mg tablet Take 1 tablet by mouth every 6 hours as needed for moderate pain (4 - 6). 5 tablet 0    pregabalin (Lyrica) 25 mg capsule Take 2 capsules (50 mg) by mouth 2 " times a day. 120 capsule 0    promethazine (Phenergan) 25 mg tablet Take 1 tablet (25 mg) by mouth once daily as needed.       No current facility-administered medications for this visit.         Review of Systems   Constitutional:  Positive for fatigue.   HENT: Negative.     Eyes: Negative.    Respiratory: Negative.     Cardiovascular:  Positive for leg swelling.   Gastrointestinal:  Positive for abdominal distention.   Endocrine: Negative.    Genitourinary:         ESRD   Musculoskeletal: Negative.    Skin: Negative.    Allergic/Immunologic: Negative.    Neurological: Negative.    Hematological: Negative.    Psychiatric/Behavioral: Negative.                Physical Exam  Vitals reviewed.   Constitutional:       Appearance: She is ill-appearing.   HENT:      Head: Normocephalic and atraumatic.      Nose: Nose normal.      Mouth/Throat:      Mouth: Mucous membranes are moist.      Pharynx: Oropharynx is clear.   Eyes:      Extraocular Movements: Extraocular movements intact.      Conjunctiva/sclera: Conjunctivae normal.   Cardiovascular:      Rate and Rhythm: Normal rate and regular rhythm.      Pulses: Normal pulses.      Heart sounds: Normal heart sounds.   Pulmonary:      Effort: Pulmonary effort is normal.      Breath sounds: Normal breath sounds.   Abdominal:      General: Bowel sounds are normal.      Palpations: Abdomen is soft.   Genitourinary:     Comments: Assessment referred to physician  Musculoskeletal:         General: Swelling present. Normal range of motion.      Cervical back: Normal range of motion and neck supple.      Right lower leg: Edema present.      Left lower leg: Edema present.   Skin:     General: Skin is warm and dry.   Neurological:      General: No focal deficit present.      Mental Status: She is alert and oriented to person, place, and time.   Psychiatric:         Mood and Affect: Mood normal.         Behavior: Behavior normal.         Thought Content: Thought content normal.          "Judgment: Judgment normal.          PAT AIRWAY:   Airway:     Mallampati::  III    TM distance::  >3 FB    Neck ROM::  Full   implants    BP (!) 134/92   Pulse 70   Temp 37 °C (98.6 °F) (Temporal)   Ht 1.676 m (5' 6\")   Wt 70.7 kg (155 lb 14.4 oz)   SpO2 98%   BMI 25.16 kg/m²     ASA: III  HAYDEE: 8.5%  RCRI: 6.6%      DASI Risk Score      Flowsheet Row Most Recent Value   DASI SCORE 15.95   METS Score (Will be calculated only when all the questions are answered) 4.7          Caprini DVT Assessment    No data to display       Modified Frailty Index    No data to display       CHADS2 Stroke Risk  Current as of 13 minutes ago        5.9% 3 to 100%: High Risk   2 to < 3%: Medium Risk   0 to < 2%: Low Risk     Last Change:           This score determines the patient's risk of having a stroke if the patient has atrial fibrillation.          Points Metrics   0 Has Congestive Heart Failure:  No     Patients with congestive heart failure get 1 point.    Current as of 13 minutes ago   1 Has Hypertension:  Yes     Patients with hypertension get 1 point.    Current as of 13 minutes ago   0 Age:  48     Patients who are 75 years of age or older get 1 point.    Current as of 13 minutes ago   0 Has Diabetes:  No     Patients with diabetes get 1 point.    Current as of 13 minutes ago   2 Had Stroke:  Yes  Had TIA:  No  Had Thromboembolism:  No     Patients who have had a stroke, TIA, or thromboembolism get 2 points.    Current as of 13 minutes ago             Revised Cardiac Risk Index      Flowsheet Row Most Recent Value   Revised Cardiac Risk Calculator 2          Apfel Simplified Score    No data to display       Risk Analysis Index Results This Encounter    No data found in the last 1 encounters.       Stop Bang Score      Flowsheet Row Most Recent Value   Do you snore loudly? 0   Do you often feel tired or fatigued after your sleep? 0   Has anyone ever observed you stop breathing in your sleep? 0   Do you have or are you " being treated for high blood pressure? 1   Recent BMI (Calculated) 25.2   Is BMI greater than 35 kg/m2? 0=No   Age older than 50 years old? 0=No   Is your neck circumference greater than 17 inches (Male) or 16 inches (Female)? 0   Gender - Male 0=No   STOP-BANG Total Score 1            Assessment and Plan:     ESRD: Dialysis   CAD: hx of mitral and aortic valve replacement  HTN  Hx of Stroke  CHF      Paty Hinds, APRN-CNP

## 2024-09-20 ENCOUNTER — ANESTHESIA (OUTPATIENT)
Dept: CARDIOLOGY | Facility: HOSPITAL | Age: 49
End: 2024-09-20
Payer: MEDICARE

## 2024-09-20 ENCOUNTER — HOSPITAL ENCOUNTER (OUTPATIENT)
Dept: CARDIOLOGY | Facility: HOSPITAL | Age: 49
Discharge: HOME | End: 2024-09-20
Payer: MEDICARE

## 2024-09-20 ENCOUNTER — ANESTHESIA EVENT (OUTPATIENT)
Dept: CARDIOLOGY | Facility: HOSPITAL | Age: 49
End: 2024-09-20
Payer: MEDICARE

## 2024-09-20 VITALS
RESPIRATION RATE: 16 BRPM | OXYGEN SATURATION: 94 % | DIASTOLIC BLOOD PRESSURE: 57 MMHG | HEART RATE: 71 BPM | TEMPERATURE: 96.8 F | SYSTOLIC BLOOD PRESSURE: 151 MMHG

## 2024-09-20 DIAGNOSIS — N18.6 END STAGE RENAL DISEASE (MULTI): ICD-10-CM

## 2024-09-20 LAB
GLUCOSE BLD MANUAL STRIP-MCNC: 110 MG/DL (ref 74–99)
GLUCOSE BLD MANUAL STRIP-MCNC: 77 MG/DL (ref 74–99)
POTASSIUM SERPL-SCNC: 3.9 MMOL/L (ref 3.5–5.3)
POTASSIUM SERPL-SCNC: 5.9 MMOL/L (ref 3.5–5.3)

## 2024-09-20 PROCEDURE — C1769 GUIDE WIRE: HCPCS

## 2024-09-20 PROCEDURE — 77001 FLUOROGUIDE FOR VEIN DEVICE: CPT

## 2024-09-20 PROCEDURE — 2500000004 HC RX 250 GENERAL PHARMACY W/ HCPCS (ALT 636 FOR OP/ED)

## 2024-09-20 PROCEDURE — 3700000002 HC GENERAL ANESTHESIA TIME - EACH INCREMENTAL 1 MINUTE

## 2024-09-20 PROCEDURE — 84132 ASSAY OF SERUM POTASSIUM: CPT | Performed by: STUDENT IN AN ORGANIZED HEALTH CARE EDUCATION/TRAINING PROGRAM

## 2024-09-20 PROCEDURE — 7100000009 HC PHASE TWO TIME - INITIAL BASE CHARGE

## 2024-09-20 PROCEDURE — 36415 COLL VENOUS BLD VENIPUNCTURE: CPT | Performed by: STUDENT IN AN ORGANIZED HEALTH CARE EDUCATION/TRAINING PROGRAM

## 2024-09-20 PROCEDURE — 2500000004 HC RX 250 GENERAL PHARMACY W/ HCPCS (ALT 636 FOR OP/ED): Performed by: STUDENT IN AN ORGANIZED HEALTH CARE EDUCATION/TRAINING PROGRAM

## 2024-09-20 PROCEDURE — 2500000002 HC RX 250 W HCPCS SELF ADMINISTERED DRUGS (ALT 637 FOR MEDICARE OP, ALT 636 FOR OP/ED): Performed by: STUDENT IN AN ORGANIZED HEALTH CARE EDUCATION/TRAINING PROGRAM

## 2024-09-20 PROCEDURE — 2500000005 HC RX 250 GENERAL PHARMACY W/O HCPCS: Performed by: STUDENT IN AN ORGANIZED HEALTH CARE EDUCATION/TRAINING PROGRAM

## 2024-09-20 PROCEDURE — 2780000003 HC OR 278 NO HCPCS

## 2024-09-20 PROCEDURE — 7100000010 HC PHASE TWO TIME - EACH INCREMENTAL 1 MINUTE

## 2024-09-20 PROCEDURE — 2500000005 HC RX 250 GENERAL PHARMACY W/O HCPCS: Performed by: RADIOLOGY

## 2024-09-20 PROCEDURE — 3700000001 HC GENERAL ANESTHESIA TIME - INITIAL BASE CHARGE

## 2024-09-20 PROCEDURE — 93005 ELECTROCARDIOGRAM TRACING: CPT

## 2024-09-20 PROCEDURE — 36581 REPLACE TUNNELED CV CATH: CPT

## 2024-09-20 PROCEDURE — 2720000007 HC OR 272 NO HCPCS

## 2024-09-20 PROCEDURE — 82947 ASSAY GLUCOSE BLOOD QUANT: CPT

## 2024-09-20 RX ORDER — MIDAZOLAM HYDROCHLORIDE 1 MG/ML
INJECTION, SOLUTION INTRAMUSCULAR; INTRAVENOUS AS NEEDED
Status: DISCONTINUED | OUTPATIENT
Start: 2024-09-20 | End: 2024-09-20

## 2024-09-20 RX ORDER — SODIUM CHLORIDE 9 MG/ML
100 INJECTION, SOLUTION INTRAVENOUS CONTINUOUS
Status: DISCONTINUED | OUTPATIENT
Start: 2024-09-20 | End: 2024-09-21 | Stop reason: HOSPADM

## 2024-09-20 RX ORDER — LIDOCAINE HYDROCHLORIDE 10 MG/ML
INJECTION, SOLUTION EPIDURAL; INFILTRATION; INTRACAUDAL; PERINEURAL AS NEEDED
Status: DISCONTINUED | OUTPATIENT
Start: 2024-09-20 | End: 2024-09-21 | Stop reason: HOSPADM

## 2024-09-20 RX ORDER — ALBUTEROL SULFATE 0.83 MG/ML
20 SOLUTION RESPIRATORY (INHALATION) ONCE
Status: DISCONTINUED | OUTPATIENT
Start: 2024-09-20 | End: 2024-09-20

## 2024-09-20 RX ORDER — DEXTROSE MONOHYDRATE 100 MG/ML
50 INJECTION, SOLUTION INTRAVENOUS CONTINUOUS
Status: ACTIVE | OUTPATIENT
Start: 2024-09-20 | End: 2024-09-20

## 2024-09-20 RX ORDER — KETAMINE HYDROCHLORIDE 50 MG/ML
INJECTION, SOLUTION INTRAMUSCULAR; INTRAVENOUS AS NEEDED
Status: DISCONTINUED | OUTPATIENT
Start: 2024-09-20 | End: 2024-09-20

## 2024-09-20 RX ORDER — ALBUTEROL SULFATE 0.83 MG/ML
2.5 SOLUTION RESPIRATORY (INHALATION) ONCE
Status: DISCONTINUED | OUTPATIENT
Start: 2024-09-20 | End: 2024-09-21 | Stop reason: HOSPADM

## 2024-09-20 RX ORDER — DEXTROSE 50 % IN WATER (D50W) INTRAVENOUS SYRINGE
25 ONCE
Status: COMPLETED | OUTPATIENT
Start: 2024-09-20 | End: 2024-09-20

## 2024-09-20 SDOH — HEALTH STABILITY: MENTAL HEALTH: CURRENT SMOKER: 0

## 2024-09-20 ASSESSMENT — PAIN SCALES - GENERAL: PAINLEVEL_OUTOF10: 0 - NO PAIN

## 2024-09-20 ASSESSMENT — PAIN - FUNCTIONAL ASSESSMENT: PAIN_FUNCTIONAL_ASSESSMENT: 0-10

## 2024-09-20 NOTE — DISCHARGE INSTRUCTIONS
Follow these Guidelines for a Safer Recovery:    Activity:  ? Rest by sitting up for 4 - 6 hours.  ? Limit activity for 24 hours after the procedure.  ? No driving for 24 hours.  ? Do not do any heavy lifting, over 10 pounds, for 48 hours.  ? Avoid intense exercise and contact sports for 48 hours.    Diet:  ? You may resume your normal diet.    Medicines:  ? If you take blood thinning medications, ask your doctor when you can take these again.  ? You may take your other medicines as ordered by your doctor.  ? Bleeding from the procedure site.    If you go home after the procedure:  ? You must have someone drive you home after the test. You will not be allowed to drive  or travel home alone in a cab or on a bus. You should not drive for 24 hours after the  test.  ? Someone should stay with you through the night.  ? Resume your regular diet and medicines.  ? Rest until morning and avoid strenuous activities for the next 2 days.  ? Avoid getting the site wet if it is accessed with a needle or not completely healed after the  procedure.    Call Your Doctor Right Away:  ? Bleeding from the procedure site.  ? Shortness of breath or trouble breathing.  ? Increase in pain at the site.  ? Dizziness or fainting.  ? Shortness of breath or trouble breathing.  ? Increased pain at the procedure site.  ? Dizziness or fainting,  If you are not able to contact your doctor, call 911 and go to the nearest Emergency Room.    Also, Call your Doctor:  ? If the end caps come off. Be sure the line is clamped.  ? If there is any bleeding around the tube.  ? If there is any signs of infection including:  o Redness, swelling or pus-like drainage at the biopsy site.  o Night sweats.  o Pain or tenderness at the procedure site.  o Fever of 100.4 degrees F or higher.  o Shaking or chills.  ? If the tube is damaged.  ? If you develop swelling in the arm, chest or neck on the tube side.  ? If the tube comes out.  o Do not try to push it back in.  Apply pressure for 10 minutes where the tube came  out with a clean gauze or clean towel. If the bleeding does not stop, continue to  apply pressure and have someone take you to the nearest emergency room. If you  have trouble, call 911 and lie on your left side with your head down.  ? If you have any questions.

## 2024-09-20 NOTE — ANESTHESIA PREPROCEDURE EVALUATION
"Patient: Batsheva Page    Procedure Information       Date/Time: 05/07/24 1015    Scheduled providers: Chris Lott MD    Procedure: IR CVC EXCHANGE    Location: [facility]          Past Medical History:   Diagnosis Date    Aortic valve replaced 2022    CHF (congestive heart failure) (Multi)     Chronic pain     Coronary artery disease     Disease of thyroid gland     ESRD (end stage renal disease) (Multi)     H/O mitral valve replacement 2013    and 2022    Heart disease     History of transfusion     Hypertension     Mitral valve regurgitation     Seizures (Multi)     Stroke (Multi)      Past Surgical History:   Procedure Laterality Date    AORTIC VALVE REPLACEMENT  09/26/2022    bioprosthetic    CORONARY ARTERY BYPASS GRAFT      IR CVC  01/12/2024    exchange    IR CVC  05/07/2024    exchange    IR CVC  08/20/2024    removal    IR CVC TUNNELED  09/09/2022    IR CVC TUNNELED 9/9/2022 [facility] [place] [facility]    IR CVC TUNNELED  12/28/2022    IR CVC TUNNELED 12/28/2022 [facility] [place] [facility]    MITRAL VALVE REPAIR  2013    MITRAL VALVE REPLACEMENT  09/26/2022    bioprosthetic    PARATHYROIDECTOMY      TRICUSPID VALVULOPLASTY  2013    US GUIDED PERCUTANEOUS PLACEMENT  07/14/2022    US GUIDED PERCUTANEOUS PLACEMENT [facility]     Allergies   Allergen Reactions    Kayexalate Seizure    Metoclopramide Hcl Other     anxious, agitated, \"skin crawl    Prochlorperazine Other     anxious, agitated, \"skin crawl    Zofran [Ondansetron Hcl] Headache    Ondansetron Other and Headache       Relevant Problems   Anesthesia (within normal limits)      Cardiac   (+) Arteriosclerosis of coronary artery bypass graft   (+) Benign essential hypertension   (+) HTN (hypertension)   (+) Paroxysmal atrial fibrillation (Multi)      Neuro   (+) Anxiety   (+) Depression with anxiety   (+) Major depressive disorder, recurrent, severe with psychotic symptoms (Multi)   (+) Seizure (Multi)      /Renal  Has been 1 " week since last HD.  They refused to do HD with her HD line.  K elevated 5.9   (+) ESRD (end stage renal disease) (Multi)   (+) End-stage renal disease on hemodialysis (Multi)   (+) Hyponatremia      Hematology   (+) Anemia of chronic disease   (+) Anemia secondary to renal failure   (+) Iron deficiency anemia      ID   (+) MRSA (methicillin resistant Staphylococcus aureus) infection   (+) MRSA bacteremia       Clinical information reviewed:                   NPO Detail:  No data recorded       Physical Exam    Airway  Mallampati: II  TM distance: >3 FB  Neck ROM: full     Cardiovascular   Comments: deferred   Dental    Pulmonary   Comments: deferred   Abdominal            Anesthesia Plan    History of general anesthesia?: yes  History of complications of general anesthesia?: no    ASA 4     MAC   (Pt refusing local anesthesia.  Discussion with patient and surgeon regarding risks of proceeding with sedation while hyperkalemic.  Discussed possibility of cardiac arrest with patient.  She wants to proceed with sedation.  Informed patient, I would only be able to give minimal sedation.  Preop EKG shows no peaked T waves, or other signs of hyperkalemia.  Will administer preop albuterol, and insulin.)  The patient is not a current smoker.  Patient was not previously instructed to abstain from smoking on day of procedure.  Patient did not smoke on day of procedure.  Education provided regarding risk of obstructive sleep apnea.  intravenous induction   Postoperative administration of opioids is intended.  Anesthetic plan and risks discussed with patient.  Use of blood products discussed with patient who consented to blood products.    Plan discussed with CRNA and CAA.

## 2024-09-20 NOTE — ANESTHESIA POSTPROCEDURE EVALUATION
Patient: Batsheva Page    Procedure Summary       Date: 09/20/24 Room / Location: [facility]    Anesthesia Start: 1450 Anesthesia Stop: 1535    Procedures:       IR CVC EXCHANGE      ECG 12-LEAD Diagnosis:       End stage renal disease (Multi)      (DIALYSIS CATHETER EXCHANGE WITH MAC ANESTHESIA)      (hyperkalemia)    Scheduled Providers: Lane Jean DO; Chris Lott MD; DANIELLA Madrigal Responsible Provider: Lane Jean DO    Anesthesia Type: MAC ASA Status: 4            Anesthesia Type: MAC    Vitals Value Taken Time   /100 09/20/24 1537   Temp 36 09/20/24 1537   Pulse 83 09/20/24 1537   Resp 16 09/20/24 1537   SpO2 98 09/20/24 1537       Anesthesia Post Evaluation    Patient location during evaluation: bedside  Patient participation: complete - patient participated  Level of consciousness: awake and alert  Pain management: adequate  Multimodal analgesia pain management approach  Airway patency: patent  Two or more strategies used to mitigate risk of obstructive sleep apnea  Cardiovascular status: acceptable  Respiratory status: acceptable  Hydration status: acceptable  Postoperative Nausea and Vomiting: none    Discussion was had with Dr Lott about admitting the patient for urgent HD due to elevated potassium.  The patient states that she has an appointment to get HD at 1715 this evening.  Dr Lott called Dr Pedersen who said it would be ok to discharge her to keep her HD appointment rather than admit her for HD.  Dr Lott agrees to discharge her to obtain her HD today.  Pt will have potassium tested prior to discharge.  Will not be discharge if potassium is higher than it was preprocedure.  Patient made aware of risks including death due to hyperkalemia if she does not obtain urgent HD.  Patient understand and wants to be discharged to keep her appointment.  Repeat K was 3.9.  Of note prior K of 5.9 was hydrolyzed.  Pt will be discharged for her scheduled  HD today.    There were no known notable events for this encounter.

## 2024-09-20 NOTE — Clinical Note
Anesthesia monitoring patient Pap/HPV collected  Mammogram ordered  Colonoscopy current    Vulvovaginal atrophy - otc lubricants vs vaginal estrogen discussed  Safe and effective use of estrogen discussed  Encourage healthy diet, exercise, Calcium 1200mg per day and at least 800 iu Vitamin D daily

## 2024-09-26 LAB
ATRIAL RATE: 63 BPM
P AXIS: 52 DEGREES
P OFFSET: 188 MS
P ONSET: 133 MS
PR INTERVAL: 176 MS
Q ONSET: 221 MS
QRS COUNT: 10 BEATS
QRS DURATION: 74 MS
QT INTERVAL: 428 MS
QTC CALCULATION(BAZETT): 437 MS
QTC FREDERICIA: 435 MS
R AXIS: -12 DEGREES
T AXIS: 40 DEGREES
T OFFSET: 435 MS
VENTRICULAR RATE: 63 BPM

## 2024-10-03 ENCOUNTER — PATIENT OUTREACH (OUTPATIENT)
Dept: CARE COORDINATION | Facility: CLINIC | Age: 49
End: 2024-10-03
Payer: MEDICARE

## 2024-10-03 NOTE — PROGRESS NOTES
Outreach call to patient to check in 30 days after hospital discharge to support smooth transition of care. Patient states, she is doing good. Patient with no additional needs noted. No additional outreach needed at this time. CM will close case.    Demetria Martinez RN/BRENDA  [facility] [place] [facility]  [phone]]

## 2024-12-02 ENCOUNTER — LAB REQUISITION (OUTPATIENT)
Dept: LAB | Facility: HOSPITAL | Age: 49
End: 2024-12-02
Payer: MEDICARE

## 2024-12-02 ENCOUNTER — APPOINTMENT (OUTPATIENT)
Dept: CARDIOLOGY | Facility: HOSPITAL | Age: 49
End: 2024-12-02
Payer: MEDICARE

## 2024-12-02 ENCOUNTER — APPOINTMENT (OUTPATIENT)
Dept: RADIOLOGY | Facility: HOSPITAL | Age: 49
End: 2024-12-02
Payer: MEDICARE

## 2024-12-02 ENCOUNTER — HOSPITAL ENCOUNTER (EMERGENCY)
Facility: HOSPITAL | Age: 49
Discharge: ANOTHER HOSPITAL | End: 2024-12-02
Attending: STUDENT IN AN ORGANIZED HEALTH CARE EDUCATION/TRAINING PROGRAM
Payer: MEDICARE

## 2024-12-02 DIAGNOSIS — J18.9 PNEUMONIA OF RIGHT LOWER LOBE DUE TO INFECTIOUS ORGANISM: Primary | ICD-10-CM

## 2024-12-02 LAB
ALBUMIN SERPL BCP-MCNC: 3.9 G/DL (ref 3.4–5)
ALP SERPL-CCNC: 106 U/L (ref 33–110)
ALT SERPL W P-5'-P-CCNC: 8 U/L (ref 7–45)
ANION GAP SERPL CALCULATED.3IONS-SCNC: 19 MMOL/L (ref 10–20)
AST SERPL W P-5'-P-CCNC: 18 U/L (ref 9–39)
B-HCG SERPL-ACNC: 3 MIU/ML
BASOPHILS # BLD AUTO: 0.02 X10*3/UL (ref 0–0.1)
BASOPHILS NFR BLD AUTO: 0.1 %
BILIRUB SERPL-MCNC: 0.7 MG/DL (ref 0–1.2)
BUN SERPL-MCNC: 36 MG/DL (ref 6–23)
CALCIUM SERPL-MCNC: 9 MG/DL (ref 8.6–10.3)
CHLORIDE SERPL-SCNC: 90 MMOL/L (ref 98–107)
CO2 SERPL-SCNC: 26 MMOL/L (ref 21–32)
CREAT SERPL-MCNC: 6.82 MG/DL (ref 0.5–1.05)
EGFRCR SERPLBLD CKD-EPI 2021: 7 ML/MIN/1.73M*2
EOSINOPHIL # BLD AUTO: 0 X10*3/UL (ref 0–0.7)
EOSINOPHIL NFR BLD AUTO: 0 %
ERYTHROCYTE [DISTWIDTH] IN BLOOD BY AUTOMATED COUNT: 17.2 % (ref 11.5–14.5)
FLUAV RNA RESP QL NAA+PROBE: NOT DETECTED
FLUBV RNA RESP QL NAA+PROBE: NOT DETECTED
GLUCOSE SERPL-MCNC: 106 MG/DL (ref 74–99)
HCT VFR BLD AUTO: 35.9 % (ref 36–46)
HGB BLD-MCNC: 11.1 G/DL (ref 12–16)
IMM GRANULOCYTES # BLD AUTO: 0.22 X10*3/UL (ref 0–0.7)
IMM GRANULOCYTES NFR BLD AUTO: 1.2 % (ref 0–0.9)
LACTATE SERPL-SCNC: 1.4 MMOL/L (ref 0.4–2)
LIPASE SERPL-CCNC: <3 U/L (ref 9–82)
LYMPHOCYTES # BLD AUTO: 0.43 X10*3/UL (ref 1.2–4.8)
LYMPHOCYTES NFR BLD AUTO: 2.4 %
MAGNESIUM SERPL-MCNC: 1.64 MG/DL (ref 1.6–2.4)
MCH RBC QN AUTO: 28.6 PG (ref 26–34)
MCHC RBC AUTO-ENTMCNC: 30.9 G/DL (ref 32–36)
MCV RBC AUTO: 93 FL (ref 80–100)
MONOCYTES # BLD AUTO: 0.63 X10*3/UL (ref 0.1–1)
MONOCYTES NFR BLD AUTO: 3.6 %
NEUTROPHILS # BLD AUTO: 16.43 X10*3/UL (ref 1.2–7.7)
NEUTROPHILS NFR BLD AUTO: 92.7 %
NRBC BLD-RTO: 0 /100 WBCS (ref 0–0)
PLATELET # BLD AUTO: 188 X10*3/UL (ref 150–450)
POTASSIUM SERPL-SCNC: 4.1 MMOL/L (ref 3.5–5.3)
PROT SERPL-MCNC: 8.5 G/DL (ref 6.4–8.2)
RBC # BLD AUTO: 3.88 X10*6/UL (ref 4–5.2)
SARS-COV-2 RNA RESP QL NAA+PROBE: NOT DETECTED
SODIUM SERPL-SCNC: 131 MMOL/L (ref 136–145)
WBC # BLD AUTO: 17.7 X10*3/UL (ref 4.4–11.3)

## 2024-12-02 PROCEDURE — 96365 THER/PROPH/DIAG IV INF INIT: CPT

## 2024-12-02 PROCEDURE — 84702 CHORIONIC GONADOTROPIN TEST: CPT | Performed by: PHYSICIAN ASSISTANT

## 2024-12-02 PROCEDURE — 2500000001 HC RX 250 WO HCPCS SELF ADMINISTERED DRUGS (ALT 637 FOR MEDICARE OP): Performed by: PHYSICIAN ASSISTANT

## 2024-12-02 PROCEDURE — 87077 CULTURE AEROBIC IDENTIFY: CPT | Mod: WESLAB | Performed by: PHYSICIAN ASSISTANT

## 2024-12-02 PROCEDURE — 99285 EMERGENCY DEPT VISIT HI MDM: CPT | Mod: 25 | Performed by: STUDENT IN AN ORGANIZED HEALTH CARE EDUCATION/TRAINING PROGRAM

## 2024-12-02 PROCEDURE — 2500000004 HC RX 250 GENERAL PHARMACY W/ HCPCS (ALT 636 FOR OP/ED): Mod: JZ | Performed by: STUDENT IN AN ORGANIZED HEALTH CARE EDUCATION/TRAINING PROGRAM

## 2024-12-02 PROCEDURE — 87040 BLOOD CULTURE FOR BACTERIA: CPT

## 2024-12-02 PROCEDURE — 87075 CULTR BACTERIA EXCEPT BLOOD: CPT

## 2024-12-02 PROCEDURE — 83690 ASSAY OF LIPASE: CPT | Performed by: PHYSICIAN ASSISTANT

## 2024-12-02 PROCEDURE — 87077 CULTURE AEROBIC IDENTIFY: CPT

## 2024-12-02 PROCEDURE — 36415 COLL VENOUS BLD VENIPUNCTURE: CPT | Performed by: PHYSICIAN ASSISTANT

## 2024-12-02 PROCEDURE — 83605 ASSAY OF LACTIC ACID: CPT | Performed by: PHYSICIAN ASSISTANT

## 2024-12-02 PROCEDURE — 71045 X-RAY EXAM CHEST 1 VIEW: CPT

## 2024-12-02 PROCEDURE — 2500000004 HC RX 250 GENERAL PHARMACY W/ HCPCS (ALT 636 FOR OP/ED): Performed by: EMERGENCY MEDICINE

## 2024-12-02 PROCEDURE — 85025 COMPLETE CBC W/AUTO DIFF WBC: CPT | Performed by: PHYSICIAN ASSISTANT

## 2024-12-02 PROCEDURE — 2500000004 HC RX 250 GENERAL PHARMACY W/ HCPCS (ALT 636 FOR OP/ED): Performed by: PHYSICIAN ASSISTANT

## 2024-12-02 PROCEDURE — 87186 SC STD MICRODIL/AGAR DIL: CPT

## 2024-12-02 PROCEDURE — 96375 TX/PRO/DX INJ NEW DRUG ADDON: CPT

## 2024-12-02 PROCEDURE — 74176 CT ABD & PELVIS W/O CONTRAST: CPT

## 2024-12-02 PROCEDURE — 71045 X-RAY EXAM CHEST 1 VIEW: CPT | Performed by: RADIOLOGY

## 2024-12-02 PROCEDURE — 96366 THER/PROPH/DIAG IV INF ADDON: CPT

## 2024-12-02 PROCEDURE — 87205 SMEAR GRAM STAIN: CPT

## 2024-12-02 PROCEDURE — 96361 HYDRATE IV INFUSION ADD-ON: CPT

## 2024-12-02 PROCEDURE — 87636 SARSCOV2 & INF A&B AMP PRB: CPT | Performed by: PHYSICIAN ASSISTANT

## 2024-12-02 PROCEDURE — 83735 ASSAY OF MAGNESIUM: CPT | Performed by: PHYSICIAN ASSISTANT

## 2024-12-02 PROCEDURE — 80053 COMPREHEN METABOLIC PANEL: CPT | Performed by: PHYSICIAN ASSISTANT

## 2024-12-02 PROCEDURE — 93005 ELECTROCARDIOGRAM TRACING: CPT

## 2024-12-02 PROCEDURE — 96376 TX/PRO/DX INJ SAME DRUG ADON: CPT

## 2024-12-02 PROCEDURE — 74176 CT ABD & PELVIS W/O CONTRAST: CPT | Performed by: STUDENT IN AN ORGANIZED HEALTH CARE EDUCATION/TRAINING PROGRAM

## 2024-12-02 RX ORDER — VANCOMYCIN 1.25 G/250ML
1750 INJECTION, SOLUTION INTRAVENOUS ONCE
Status: COMPLETED | OUTPATIENT
Start: 2024-12-02 | End: 2024-12-02

## 2024-12-02 RX ORDER — MORPHINE SULFATE 2 MG/ML
2 INJECTION, SOLUTION INTRAMUSCULAR; INTRAVENOUS ONCE
Status: COMPLETED | OUTPATIENT
Start: 2024-12-02 | End: 2024-12-02

## 2024-12-02 RX ORDER — VANCOMYCIN HYDROCHLORIDE 1 G/20ML
INJECTION, POWDER, LYOPHILIZED, FOR SOLUTION INTRAVENOUS ONCE
Status: DISCONTINUED | OUTPATIENT
Start: 2024-12-02 | End: 2024-12-02

## 2024-12-02 RX ORDER — ACETAMINOPHEN 500 MG
1000 TABLET ORAL ONCE
Status: COMPLETED | OUTPATIENT
Start: 2024-12-02 | End: 2024-12-02

## 2024-12-02 RX ORDER — ORPHENADRINE CITRATE 30 MG/ML
60 INJECTION INTRAMUSCULAR; INTRAVENOUS ONCE
Status: DISCONTINUED | OUTPATIENT
Start: 2024-12-02 | End: 2024-12-03 | Stop reason: HOSPADM

## 2024-12-02 RX ORDER — DIAZEPAM 5 MG/ML
5 INJECTION, SOLUTION INTRAMUSCULAR; INTRAVENOUS ONCE
Status: COMPLETED | OUTPATIENT
Start: 2024-12-02 | End: 2024-12-02

## 2024-12-02 RX ORDER — VANCOMYCIN 1 G/200ML
1 INJECTION, SOLUTION INTRAVENOUS ONCE
Status: DISCONTINUED | OUTPATIENT
Start: 2024-12-02 | End: 2024-12-02

## 2024-12-02 ASSESSMENT — LIFESTYLE VARIABLES
EVER HAD A DRINK FIRST THING IN THE MORNING TO STEADY YOUR NERVES TO GET RID OF A HANGOVER: NO
EVER FELT BAD OR GUILTY ABOUT YOUR DRINKING: NO
HAVE PEOPLE ANNOYED YOU BY CRITICIZING YOUR DRINKING: NO
HAVE YOU EVER FELT YOU SHOULD CUT DOWN ON YOUR DRINKING: NO
TOTAL SCORE: 0

## 2024-12-02 ASSESSMENT — PAIN DESCRIPTION - PAIN TYPE: TYPE: ACUTE PAIN

## 2024-12-02 ASSESSMENT — PAIN DESCRIPTION - PROGRESSION: CLINICAL_PROGRESSION: GRADUALLY IMPROVING

## 2024-12-02 ASSESSMENT — PAIN SCALES - GENERAL
PAINLEVEL_OUTOF10: 10 - WORST POSSIBLE PAIN
PAINLEVEL_OUTOF10: 8

## 2024-12-02 ASSESSMENT — PAIN - FUNCTIONAL ASSESSMENT
PAIN_FUNCTIONAL_ASSESSMENT: 0-10
PAIN_FUNCTIONAL_ASSESSMENT: 0-10

## 2024-12-02 NOTE — CONSULTS
Vancomycin consult in ED - one time dose of 25 mg/kg since hemodialysis patient for loading dose of 1750 mg x 1 in ER only.   Jesenia Lee, CezarD

## 2024-12-02 NOTE — ED PROVIDER NOTES
HPI   Chief Complaint   Patient presents with    Flu Symptoms     Body aches, chills, low grade fever, confusion, fatigue x 1 week. Pt dialysis M-W-F, pt had dialysis today. Pt alert and oriented x3       This is a 49-year-old female with end-stage renal disease presenting to the emergency department due to subjective fever, chills, and bodyaches x 1 day.  Patient states yesterday she was feeling mild bodyaches and today this has worsened.  She denies any coughing, headache, nasal congestion, vomiting, diarrhea, chest pain, shortness of breath, and she does endorse diffuse mild abdominal pain.  Patient did have dialysis today.  She does have a port for dialysis.  She states that she has had infections/sepsis prior due to her port.      Please see HPI for pertinent positive and negative ROS.         Patient History   Past Medical History:   Diagnosis Date    Aortic valve replaced 2022    CHF (congestive heart failure) (Multi)     Chronic pain     Coronary artery disease     Disease of thyroid gland     ESRD (end stage renal disease) (Multi)     H/O mitral valve replacement 2013    and 2022    Heart disease     History of transfusion     Hypertension     Mitral valve regurgitation     Seizures (Multi)     Stroke (Multi)      Past Surgical History:   Procedure Laterality Date    AORTIC VALVE REPLACEMENT  09/26/2022    bioprosthetic    CORONARY ARTERY BYPASS GRAFT      IR CVC  01/12/2024    exchange    IR CVC  05/07/2024    exchange    IR CVC  08/20/2024    removal    IR CVC TUNNELED  09/09/2022    IR CVC TUNNELED 9/9/2022 [facility]    IR CVC TUNNELED  12/28/2022    IR CVC TUNNELED 12/28/2022 [facility] INPATIENT [facility]GAC[facility]    MITRAL VALVE REPAIR  2013    MITRAL VALVE REPLACEMENT  09/26/2022    bioprosthetic    PARATHYROIDECTOMY      TRICUSPID VALVULOPLASTY  2013    US GUIDED PERCUTANEOUS PLACEMENT  07/14/2022    US GUIDED PERCUTANEOUS PLACEMENT [facility]     Family History   Problem Relation Name Age of  Onset    No Known Problems Mother      No Known Problems Father       Social History     Tobacco Use    Smoking status: Never    Smokeless tobacco: Never   Vaping Use    Vaping status: Never Used   Substance Use Topics    Alcohol use: Never    Drug use: Never       Physical Exam   ED Triage Vitals [12/02/24 1409]   Temperature Heart Rate Respirations BP   36.7 °C (98.1 °F) 82 16 82/53      Pulse Ox Temp Source Heart Rate Source Patient Position   98 % Temporal -- Sitting      BP Location FiO2 (%)     Right leg --       Physical Exam  GENERAL APPEARANCE: Awake and alert. No acute respiratory distress.   VITAL SIGNS: As per the nurses' triage record.  HEENT: Normocephalic, atraumatic.   NECK: Soft, nontender and supple  CHEST: Nontender to palpation. Clear to auscultation bilaterally. No rales, rhonchi, or wheezing. Symmetric rise and fall of chest wall.   HEART: Clear S1 and S2. Regular rate and rhythm.  Strong and equal pulses in the extremities.  ABDOMEN: Soft, diffuse tenderness to palpation, no guarding, rigidity, or rebound tenderness to suggest acute abdomen nondistended, positive bowel sounds, no palpable masses.  MUSCULOSKELETAL: The calves are nontender to palpation. Full gross active range of motion. Ambulating on own with no acute difficulties  NEUROLOGICAL: Awake, alert and oriented x 3. Motor power intact in the upper and lower extremities. Sensation is intact to light touch in the upper and lower extremities. Patient answering questions appropriately.   IMMUNOLOGICAL: No lymphatic streaking noted  DERMATOLOGIC: Warm and dry   PYSCH: Cooperative with appropriate mood and affect.    ED Course & MDM   ED Course as of 12/04/24 1748   Mon Dec 02, 2024   1502 EKG on my interpretation shows normal sinus rhythm, rate of 71 beats minute.  Normal axis.  QTc 456 ms, [place] interval 176.  No ST elevation or depression, no acute ischemic pattern.  No STEMI.  Normal EKG. [NT]      ED Course User Index  [NT] Antonio WOMACK  DO Isidoro         Diagnoses as of 12/04/24 1748   Pneumonia of right lower lobe due to infectious organism                 No data recorded     [place] Coma Scale Score: 15 (12/02/24 1405 : Christa Ocampo RN)                           Medical Decision Making  Parts of this chart have been completed using voice recognition software. Please excuse any errors of transcription.  My thought process and reason for plan has been formulated from the time that I saw the patient until the time of disposition and is not specific to one specific moment during their visit and furthermore my MDM encompasses this entire chart and not only this text box.      HPI: Detailed above.    Exam: A medically appropriate exam performed, outlined above, given the known history and presentation.    History obtained from: Patient    EKG: See my supervising physician's EKG interpretation    Medications given during visit:  Medications   vancomycin (Xellia) 1,750 mg in diluent combination  mL (1,750 mg intravenous New Bag 12/2/24 1703)   sodium chloride 0.9 % bolus 2,040 mL (2,000 mL intravenous New Bag 12/2/24 1557)   piperacillin-tazobactam (Zosyn) 3.375 g in dextrose (iso) IV 50 mL (0 g intravenous Stopped 12/2/24 1644)   acetaminophen (Tylenol) tablet 1,000 mg (1,000 mg oral Given 12/2/24 1606)        Diagnostic/tests  Labs Reviewed   CBC WITH AUTO DIFFERENTIAL - Abnormal       Result Value    WBC 17.7 (*)     nRBC 0.0      RBC 3.88 (*)     Hemoglobin 11.1 (*)     Hematocrit 35.9 (*)     MCV 93      MCH 28.6      MCHC 30.9 (*)     RDW 17.2 (*)     Platelets 188      Neutrophils % 92.7      Immature Granulocytes %, Automated 1.2 (*)     Lymphocytes % 2.4      Monocytes % 3.6      Eosinophils % 0.0      Basophils % 0.1      Neutrophils Absolute 16.43 (*)     Immature Granulocytes Absolute, Automated 0.22      Lymphocytes Absolute 0.43 (*)     Monocytes Absolute 0.63      Eosinophils Absolute 0.00      Basophils Absolute 0.02      COMPREHENSIVE METABOLIC PANEL - Abnormal    Glucose 106 (*)     Sodium 131 (*)     Potassium 4.1      Chloride 90 (*)     Bicarbonate 26      Anion Gap 19      Urea Nitrogen 36 (*)     Creatinine 6.82 (*)     eGFR 7 (*)     Calcium 9.0      Albumin 3.9      Alkaline Phosphatase 106      Total Protein 8.5 (*)     AST 18      Bilirubin, Total 0.7      ALT 8     LIPASE - Abnormal    Lipase <3 (*)     Narrative:     Venipuncture immediately after or during the administration of Metamizole may lead to falsely low results. Testing should be performed immediately prior to Metamizole dosing.   MAGNESIUM - Normal    Magnesium 1.64     HUMAN CHORIONIC GONADOTROPIN, SERUM QUANTITATIVE - Normal    HCG, Beta-Quantitative 3      Narrative:      Total HCG measurement is performed using the [employer] Access   Immunoassay which detects intact HCG and free beta HCG subunit.    This test is not indicated for use as a tumor marker.   HCG testing is performed using a different test methodology at [facility] than other [facility]. Direct result comparison   should only be made within the same method.       SARS-COV-2 PCR - Normal    Coronavirus 2019, PCR Not Detected      Narrative:     This assay has received FDA Emergency Use Authorization (EUA) and is only authorized for the duration of time that circumstances exist to justify the authorization of the emergency use of in vitro diagnostic tests for the detection of SARS-CoV-2 virus and/or diagnosis of COVID-19 infection under section 564(b)(1) of the Act, 21 U.S.C. 360bbb-3(b)(1). This assay is an in vitro diagnostic nucleic acid amplification test for the qualitative detection of SARS-CoV-2 from nasopharyngeal specimens and has been validated for use at [facility]. Negative results do not preclude COVID-19 infections and should not be used as the sole basis for diagnosis, treatment, or other management decisions.     INFLUENZA A  AND B PCR - Normal    Flu A Result Not Detected      Flu B Result Not Detected      Narrative:     This assay is an in vitro diagnostic multiplex nucleic acid amplification test for the detection and discrimination of Influenza A & B from nasopharyngeal specimens, and has been validated for use at [facility]. Negative results do not preclude Influenza A/B infections, and should not be used as the sole basis for diagnosis, treatment, or other management decisions. If Influenza A/B and RSV PCR results are negative, testing for Parainfluenza virus, Adenovirus and Metapneumovirus is routinely performed for [facility] pediatric oncology and intensive care inpatients, and is available on other patients by placing an add-on request.   LACTATE - Normal    Lactate 1.4      Narrative:     Venipuncture immediately after or during the administration of Metamizole may lead to falsely low results. Testing should be performed immediately prior to Metamizole dosing.   BLOOD CULTURE   BLOOD CULTURE   URINALYSIS WITH REFLEX CULTURE AND MICROSCOPIC    Narrative:     The following orders were created for panel order Urinalysis with Reflex Culture and Microscopic.  Procedure                               Abnormality         Status                     ---------                               -----------         ------                     Urinalysis with Reflex C...[[id number]]                                                 Extra Urine Gray Tube[[id number]]                                                         Please view results for these tests on the individual orders.   URINALYSIS WITH REFLEX CULTURE AND MICROSCOPIC   EXTRA URINE GRAY TUBE      XR chest 1 view   Final Result   1.  Developing right basal infiltrate. See discussion above. Possible   developing retrocardiac left basal infiltrate.                  MACRO:   None        Signed by: Joseph Schoenberger 12/2/2024 3:59 PM   Dictation workstation:   [id number]       CT abdomen pelvis wo IV contrast    (Results Pending)        Considerations/further MDM:  Patient was seen in conjucntion with my supervising physician,  Dr. Chaudhry. Please refer to his note.    Differential diagnosis includes was not limited to sepsis versus electrolyte disturbance versus viral syndrome    30 mL/kg IV fluid bolus ordered, IV Zosyn, IV vancomycin, lactate, and blood cultures x 2.  Chest x-ray does reveal right basilar infiltrate.  CBC does show leukocytosis.  CMP does show renal function consistent with end-stage renal disease.  Patient did receive dialysis today.  Negative viral swabs.  Lactate 1.4.  At the end my shift, patient CT abdomen pelvis without IV contrast was pending.  Plan for admission after CT imaging was back.  Please refer to my supervising physician for further management and disposition of this patient while she remained in the emergency department.  End of my shift 12/2/2024 at 1800.      Procedure  Procedures     Farideh Seo PA-C  12/04/24 9099

## 2024-12-03 ENCOUNTER — HOSPITAL ENCOUNTER (INPATIENT)
Facility: HOSPITAL | Age: 49
End: 2024-12-03
Attending: INTERNAL MEDICINE | Admitting: INTERNAL MEDICINE
Payer: MEDICARE

## 2024-12-03 VITALS
TEMPERATURE: 98.1 F | HEART RATE: 80 BPM | SYSTOLIC BLOOD PRESSURE: 118 MMHG | DIASTOLIC BLOOD PRESSURE: 69 MMHG | WEIGHT: 149.91 LBS | RESPIRATION RATE: 20 BRPM | OXYGEN SATURATION: 96 % | HEIGHT: 66 IN | BODY MASS INDEX: 24.09 KG/M2

## 2024-12-03 DIAGNOSIS — Z86.79 HISTORY OF ENDOCARDITIS: ICD-10-CM

## 2024-12-03 DIAGNOSIS — B95.62 MRSA BACTEREMIA: ICD-10-CM

## 2024-12-03 DIAGNOSIS — A41.9 SEPSIS (MULTI): Primary | ICD-10-CM

## 2024-12-03 DIAGNOSIS — R78.81 BACTEREMIA ASSOCIATED WITH INTRAVASCULAR LINE, INITIAL ENCOUNTER (CMS-HCC): ICD-10-CM

## 2024-12-03 DIAGNOSIS — R78.81 MRSA BACTEREMIA: ICD-10-CM

## 2024-12-03 DIAGNOSIS — T82.7XXA BACTEREMIA ASSOCIATED WITH INTRAVASCULAR LINE, INITIAL ENCOUNTER (CMS-HCC): ICD-10-CM

## 2024-12-03 LAB
ANION GAP SERPL CALC-SCNC: 22 MMOL/L (ref 10–20)
BUN SERPL-MCNC: 42 MG/DL (ref 6–23)
CALCIUM SERPL-MCNC: 7.6 MG/DL (ref 8.6–10.3)
CHLORIDE SERPL-SCNC: 95 MMOL/L (ref 98–107)
CO2 SERPL-SCNC: 20 MMOL/L (ref 21–32)
CREAT SERPL-MCNC: 7.6 MG/DL (ref 0.5–1.05)
CRP SERPL-MCNC: 27.39 MG/DL
EGFRCR SERPLBLD CKD-EPI 2021: 6 ML/MIN/1.73M*2
ERYTHROCYTE [DISTWIDTH] IN BLOOD BY AUTOMATED COUNT: 17.4 % (ref 11.5–14.5)
ERYTHROCYTE [SEDIMENTATION RATE] IN BLOOD BY WESTERGREN METHOD: 48 MM/H (ref 0–20)
GLUCOSE SERPL-MCNC: 108 MG/DL (ref 74–99)
HCT VFR BLD AUTO: 33.2 % (ref 36–46)
HGB BLD-MCNC: 9.7 G/DL (ref 12–16)
MCH RBC QN AUTO: 28 PG (ref 26–34)
MCHC RBC AUTO-ENTMCNC: 29.2 G/DL (ref 32–36)
MCV RBC AUTO: 96 FL (ref 80–100)
NRBC BLD-RTO: 0 /100 WBCS (ref 0–0)
PLATELET # BLD AUTO: 142 X10*3/UL (ref 150–450)
POTASSIUM SERPL-SCNC: 4.5 MMOL/L (ref 3.5–5.3)
RBC # BLD AUTO: 3.47 X10*6/UL (ref 4–5.2)
SODIUM SERPL-SCNC: 132 MMOL/L (ref 136–145)
WBC # BLD AUTO: 13.3 X10*3/UL (ref 4.4–11.3)

## 2024-12-03 PROCEDURE — 85027 COMPLETE CBC AUTOMATED: CPT | Performed by: INTERNAL MEDICINE

## 2024-12-03 PROCEDURE — 36415 COLL VENOUS BLD VENIPUNCTURE: CPT | Performed by: INTERNAL MEDICINE

## 2024-12-03 PROCEDURE — 87077 CULTURE AEROBIC IDENTIFY: CPT | Mod: GEALAB | Performed by: STUDENT IN AN ORGANIZED HEALTH CARE EDUCATION/TRAINING PROGRAM

## 2024-12-03 PROCEDURE — 99223 1ST HOSP IP/OBS HIGH 75: CPT | Performed by: INTERNAL MEDICINE

## 2024-12-03 PROCEDURE — 87081 CULTURE SCREEN ONLY: CPT | Mod: GEALAB | Performed by: INTERNAL MEDICINE

## 2024-12-03 PROCEDURE — 80048 BASIC METABOLIC PNL TOTAL CA: CPT | Performed by: INTERNAL MEDICINE

## 2024-12-03 PROCEDURE — 86140 C-REACTIVE PROTEIN: CPT | Performed by: INTERNAL MEDICINE

## 2024-12-03 PROCEDURE — 2500000001 HC RX 250 WO HCPCS SELF ADMINISTERED DRUGS (ALT 637 FOR MEDICARE OP): Performed by: INTERNAL MEDICINE

## 2024-12-03 PROCEDURE — 2500000004 HC RX 250 GENERAL PHARMACY W/ HCPCS (ALT 636 FOR OP/ED): Performed by: INTERNAL MEDICINE

## 2024-12-03 PROCEDURE — 2500000004 HC RX 250 GENERAL PHARMACY W/ HCPCS (ALT 636 FOR OP/ED)

## 2024-12-03 PROCEDURE — 2060000001 HC INTERMEDIATE ICU ROOM DAILY

## 2024-12-03 PROCEDURE — 85652 RBC SED RATE AUTOMATED: CPT | Performed by: INTERNAL MEDICINE

## 2024-12-03 RX ORDER — DOCUSATE SODIUM 100 MG/1
100 CAPSULE, LIQUID FILLED ORAL 2 TIMES DAILY
Status: DISPENSED | OUTPATIENT
Start: 2024-12-03

## 2024-12-03 RX ORDER — HYDROXYZINE HYDROCHLORIDE 25 MG/1
25 TABLET, FILM COATED ORAL EVERY 6 HOURS PRN
Status: ACTIVE | OUTPATIENT
Start: 2024-12-03

## 2024-12-03 RX ORDER — DIPHENHYDRAMINE HYDROCHLORIDE 50 MG/ML
INJECTION INTRAMUSCULAR; INTRAVENOUS
Status: COMPLETED
Start: 2024-12-03 | End: 2024-12-03

## 2024-12-03 RX ORDER — LACTULOSE 10 G/15ML
20 SOLUTION ORAL 3 TIMES DAILY
Status: DISPENSED | OUTPATIENT
Start: 2024-12-03

## 2024-12-03 RX ORDER — DIPHENHYDRAMINE HYDROCHLORIDE 50 MG/ML
25 INJECTION INTRAMUSCULAR; INTRAVENOUS EVERY 6 HOURS PRN
Status: DISCONTINUED | OUTPATIENT
Start: 2024-12-03 | End: 2024-12-04

## 2024-12-03 RX ORDER — LEVETIRACETAM 500 MG/1
750 TABLET ORAL NIGHTLY
Status: DISPENSED | OUTPATIENT
Start: 2024-12-03

## 2024-12-03 RX ORDER — ASPIRIN 81 MG/1
81 TABLET ORAL DAILY
Status: DISPENSED | OUTPATIENT
Start: 2024-12-03

## 2024-12-03 RX ORDER — DIPHENHYDRAMINE HCL 25 MG
25 CAPSULE ORAL EVERY 6 HOURS PRN
Status: DISCONTINUED | OUTPATIENT
Start: 2024-12-03 | End: 2024-12-03

## 2024-12-03 RX ORDER — ACETAMINOPHEN 325 MG/1
650 TABLET ORAL EVERY 6 HOURS PRN
Status: DISCONTINUED | OUTPATIENT
Start: 2024-12-03 | End: 2024-12-05

## 2024-12-03 RX ORDER — GUAIFENESIN 600 MG/1
600 TABLET, EXTENDED RELEASE ORAL EVERY 12 HOURS PRN
Status: ACTIVE | OUTPATIENT
Start: 2024-12-03

## 2024-12-03 RX ORDER — DIPHENHYDRAMINE HYDROCHLORIDE 50 MG/ML
25 INJECTION INTRAMUSCULAR; INTRAVENOUS ONCE
Status: COMPLETED | OUTPATIENT
Start: 2024-12-03 | End: 2024-12-03

## 2024-12-03 RX ORDER — ATORVASTATIN CALCIUM 40 MG/1
40 TABLET, FILM COATED ORAL DAILY
Status: DISPENSED | OUTPATIENT
Start: 2024-12-03

## 2024-12-03 RX ORDER — VANCOMYCIN HYDROCHLORIDE 750 MG/150ML
750 INJECTION, SOLUTION INTRAVENOUS
Status: DISCONTINUED | OUTPATIENT
Start: 2024-12-04 | End: 2024-12-05

## 2024-12-03 RX ORDER — VANCOMYCIN HYDROCHLORIDE 1 G/20ML
INJECTION, POWDER, LYOPHILIZED, FOR SOLUTION INTRAVENOUS DAILY PRN
Status: DISCONTINUED | OUTPATIENT
Start: 2024-12-03 | End: 2024-12-05

## 2024-12-03 RX ORDER — PROMETHAZINE HYDROCHLORIDE 25 MG/1
25 SUPPOSITORY RECTAL EVERY 12 HOURS PRN
Status: ACTIVE | OUTPATIENT
Start: 2024-12-03

## 2024-12-03 RX ORDER — PROMETHAZINE HYDROCHLORIDE 25 MG/1
25 TABLET ORAL EVERY 6 HOURS PRN
Status: DISPENSED | OUTPATIENT
Start: 2024-12-03

## 2024-12-03 RX ORDER — ERGOCALCIFEROL 1.25 MG/1
1250 CAPSULE ORAL
Status: DISPENSED | OUTPATIENT
Start: 2024-12-08

## 2024-12-03 RX ORDER — OXYCODONE AND ACETAMINOPHEN 5; 325 MG/1; MG/1
1 TABLET ORAL EVERY 6 HOURS PRN
Status: DISCONTINUED | OUTPATIENT
Start: 2024-12-03 | End: 2024-12-05

## 2024-12-03 RX ORDER — MORPHINE SULFATE 2 MG/ML
1 INJECTION, SOLUTION INTRAMUSCULAR; INTRAVENOUS
Status: DISCONTINUED | OUTPATIENT
Start: 2024-12-03 | End: 2024-12-05

## 2024-12-03 RX ORDER — CALCITRIOL 0.25 UG/1
0.5 CAPSULE ORAL DAILY
Status: DISPENSED | OUTPATIENT
Start: 2024-12-03

## 2024-12-03 RX ORDER — PREGABALIN 50 MG/1
50 CAPSULE ORAL 2 TIMES DAILY
Status: DISPENSED | OUTPATIENT
Start: 2024-12-03

## 2024-12-03 RX ORDER — GUAIFENESIN/DEXTROMETHORPHAN 100-10MG/5
5 SYRUP ORAL EVERY 4 HOURS PRN
Status: ACTIVE | OUTPATIENT
Start: 2024-12-03

## 2024-12-03 SDOH — SOCIAL STABILITY: SOCIAL INSECURITY: WERE YOU ABLE TO COMPLETE ALL THE BEHAVIORAL HEALTH SCREENINGS?: YES

## 2024-12-03 SDOH — ECONOMIC STABILITY: INCOME INSECURITY: IN THE PAST 12 MONTHS HAS THE ELECTRIC, GAS, OIL, OR WATER COMPANY THREATENED TO SHUT OFF SERVICES IN YOUR HOME?: NO

## 2024-12-03 SDOH — SOCIAL STABILITY: SOCIAL INSECURITY: ARE THERE ANY APPARENT SIGNS OF INJURIES/BEHAVIORS THAT COULD BE RELATED TO ABUSE/NEGLECT?: NO

## 2024-12-03 SDOH — HEALTH STABILITY: MENTAL HEALTH
DO YOU FEEL STRESS - TENSE, RESTLESS, NERVOUS, OR ANXIOUS, OR UNABLE TO SLEEP AT NIGHT BECAUSE YOUR MIND IS TROUBLED ALL THE TIME - THESE DAYS?: NOT AT ALL

## 2024-12-03 SDOH — ECONOMIC STABILITY: FOOD INSECURITY: WITHIN THE PAST 12 MONTHS, THE FOOD YOU BOUGHT JUST DIDN'T LAST AND YOU DIDN'T HAVE MONEY TO GET MORE.: NEVER TRUE

## 2024-12-03 SDOH — SOCIAL STABILITY: SOCIAL INSECURITY: DO YOU FEEL UNSAFE GOING BACK TO THE PLACE WHERE YOU ARE LIVING?: NO

## 2024-12-03 SDOH — SOCIAL STABILITY: SOCIAL INSECURITY: WITHIN THE LAST YEAR, HAVE YOU BEEN AFRAID OF YOUR PARTNER OR EX-PARTNER?: NO

## 2024-12-03 SDOH — SOCIAL STABILITY: SOCIAL INSECURITY: ABUSE: ADULT

## 2024-12-03 SDOH — SOCIAL STABILITY: SOCIAL INSECURITY: HAVE YOU HAD ANY THOUGHTS OF HARMING ANYONE ELSE?: NO

## 2024-12-03 SDOH — SOCIAL STABILITY: SOCIAL INSECURITY: HAVE YOU HAD THOUGHTS OF HARMING ANYONE ELSE?: NO

## 2024-12-03 SDOH — HEALTH STABILITY: PHYSICAL HEALTH: ON AVERAGE, HOW MANY DAYS PER WEEK DO YOU ENGAGE IN MODERATE TO STRENUOUS EXERCISE (LIKE A BRISK WALK)?: 0 DAYS

## 2024-12-03 SDOH — ECONOMIC STABILITY: HOUSING INSECURITY: IN THE LAST 12 MONTHS, WAS THERE A TIME WHEN YOU WERE NOT ABLE TO PAY THE MORTGAGE OR RENT ON TIME?: NO

## 2024-12-03 SDOH — SOCIAL STABILITY: SOCIAL INSECURITY
WITHIN THE LAST YEAR, HAVE YOU BEEN RAPED OR FORCED TO HAVE ANY KIND OF SEXUAL ACTIVITY BY YOUR PARTNER OR EX-PARTNER?: NO

## 2024-12-03 SDOH — ECONOMIC STABILITY: FOOD INSECURITY: WITHIN THE PAST 12 MONTHS, YOU WORRIED THAT YOUR FOOD WOULD RUN OUT BEFORE YOU GOT THE MONEY TO BUY MORE.: NEVER TRUE

## 2024-12-03 SDOH — ECONOMIC STABILITY: FOOD INSECURITY: HOW HARD IS IT FOR YOU TO PAY FOR THE VERY BASICS LIKE FOOD, HOUSING, MEDICAL CARE, AND HEATING?: NOT VERY HARD

## 2024-12-03 SDOH — ECONOMIC STABILITY: TRANSPORTATION INSECURITY: IN THE PAST 12 MONTHS, HAS LACK OF TRANSPORTATION KEPT YOU FROM MEDICAL APPOINTMENTS OR FROM GETTING MEDICATIONS?: NO

## 2024-12-03 SDOH — ECONOMIC STABILITY: HOUSING INSECURITY: AT ANY TIME IN THE PAST 12 MONTHS, WERE YOU HOMELESS OR LIVING IN A SHELTER (INCLUDING NOW)?: NO

## 2024-12-03 SDOH — HEALTH STABILITY: PHYSICAL HEALTH: ON AVERAGE, HOW MANY MINUTES DO YOU ENGAGE IN EXERCISE AT THIS LEVEL?: 0 MIN

## 2024-12-03 SDOH — SOCIAL STABILITY: SOCIAL INSECURITY: ARE YOU OR HAVE YOU BEEN THREATENED OR ABUSED PHYSICALLY, EMOTIONALLY, OR SEXUALLY BY ANYONE?: NO

## 2024-12-03 SDOH — SOCIAL STABILITY: SOCIAL INSECURITY: DOES ANYONE TRY TO KEEP YOU FROM HAVING/CONTACTING OTHER FRIENDS OR DOING THINGS OUTSIDE YOUR HOME?: NO

## 2024-12-03 SDOH — SOCIAL STABILITY: SOCIAL INSECURITY: WITHIN THE LAST YEAR, HAVE YOU BEEN HUMILIATED OR EMOTIONALLY ABUSED IN OTHER WAYS BY YOUR PARTNER OR EX-PARTNER?: NO

## 2024-12-03 SDOH — SOCIAL STABILITY: SOCIAL INSECURITY: DO YOU FEEL ANYONE HAS EXPLOITED OR TAKEN ADVANTAGE OF YOU FINANCIALLY OR OF YOUR PERSONAL PROPERTY?: NO

## 2024-12-03 SDOH — SOCIAL STABILITY: SOCIAL INSECURITY: HAS ANYONE EVER THREATENED TO HURT YOUR FAMILY OR YOUR PETS?: NO

## 2024-12-03 SDOH — ECONOMIC STABILITY: HOUSING INSECURITY: IN THE PAST 12 MONTHS, HOW MANY TIMES HAVE YOU MOVED WHERE YOU WERE LIVING?: 0

## 2024-12-03 ASSESSMENT — PAIN SCALES - GENERAL
PAINLEVEL_OUTOF10: 10 - WORST POSSIBLE PAIN
PAINLEVEL_OUTOF10: 8
PAINLEVEL_OUTOF10: 8
PAINLEVEL_OUTOF10: 10 - WORST POSSIBLE PAIN
PAINLEVEL_OUTOF10: 10 - WORST POSSIBLE PAIN
PAINLEVEL_OUTOF10: 9
PAINLEVEL_OUTOF10: 4
PAINLEVEL_OUTOF10: 10 - WORST POSSIBLE PAIN

## 2024-12-03 ASSESSMENT — ENCOUNTER SYMPTOMS
CONFUSION: 1
APPETITE CHANGE: 1
FATIGUE: 0
DIFFICULTY URINATING: 0
HEMATOLOGIC/LYMPHATIC NEGATIVE: 1
MYALGIAS: 1
FEVER: 0
ALLERGIC/IMMUNOLOGIC NEGATIVE: 1
CHILLS: 1
ABDOMINAL PAIN: 0
VOMITING: 1
EYES NEGATIVE: 1
FEVER: 1
FATIGUE: 1
NAUSEA: 1
RESPIRATORY NEGATIVE: 1
GASTROINTESTINAL NEGATIVE: 1
ENDOCRINE NEGATIVE: 1
CARDIOVASCULAR NEGATIVE: 1
SHORTNESS OF BREATH: 0

## 2024-12-03 ASSESSMENT — ACTIVITIES OF DAILY LIVING (ADL)
TOILETING: INDEPENDENT
HEARING - RIGHT EAR: FUNCTIONAL
FEEDING YOURSELF: INDEPENDENT
LACK_OF_TRANSPORTATION: NO
BATHING: INDEPENDENT
PATIENT'S MEMORY ADEQUATE TO SAFELY COMPLETE DAILY ACTIVITIES?: YES
HEARING - LEFT EAR: FUNCTIONAL
ASSISTIVE_DEVICE: EYEGLASSES;CANE
LACK_OF_TRANSPORTATION: NO
WALKS IN HOME: INDEPENDENT
JUDGMENT_ADEQUATE_SAFELY_COMPLETE_DAILY_ACTIVITIES: YES
GROOMING: INDEPENDENT
DRESSING YOURSELF: INDEPENDENT
ADEQUATE_TO_COMPLETE_ADL: YES

## 2024-12-03 ASSESSMENT — PAIN - FUNCTIONAL ASSESSMENT
PAIN_FUNCTIONAL_ASSESSMENT: 0-10
PAIN_FUNCTIONAL_ASSESSMENT: CPOT (CRITICAL CARE PAIN OBSERVATION TOOL)

## 2024-12-03 ASSESSMENT — COGNITIVE AND FUNCTIONAL STATUS - GENERAL
MOBILITY SCORE: 18
PERSONAL GROOMING: A LITTLE
HELP NEEDED FOR BATHING: A LITTLE
PATIENT BASELINE BEDBOUND: NO
TURNING FROM BACK TO SIDE WHILE IN FLAT BAD: A LITTLE
STANDING UP FROM CHAIR USING ARMS: A LITTLE
HELP NEEDED FOR BATHING: A LITTLE
STANDING UP FROM CHAIR USING ARMS: A LITTLE
MOVING FROM LYING ON BACK TO SITTING ON SIDE OF FLAT BED WITH BEDRAILS: A LITTLE
WALKING IN HOSPITAL ROOM: A LITTLE
DAILY ACTIVITIY SCORE: 19
MOVING FROM LYING ON BACK TO SITTING ON SIDE OF FLAT BED WITH BEDRAILS: A LITTLE
TOILETING: A LITTLE
CLIMB 3 TO 5 STEPS WITH RAILING: A LITTLE
DRESSING REGULAR LOWER BODY CLOTHING: A LITTLE
DRESSING REGULAR LOWER BODY CLOTHING: A LITTLE
DRESSING REGULAR UPPER BODY CLOTHING: A LITTLE
MOBILITY SCORE: 18
TOILETING: A LITTLE
DRESSING REGULAR UPPER BODY CLOTHING: A LITTLE
MOVING TO AND FROM BED TO CHAIR: A LITTLE
WALKING IN HOSPITAL ROOM: A LITTLE
MOVING TO AND FROM BED TO CHAIR: A LITTLE
CLIMB 3 TO 5 STEPS WITH RAILING: A LITTLE
DAILY ACTIVITIY SCORE: 19
PERSONAL GROOMING: A LITTLE
TURNING FROM BACK TO SIDE WHILE IN FLAT BAD: A LITTLE

## 2024-12-03 ASSESSMENT — LIFESTYLE VARIABLES
AUDIT-C TOTAL SCORE: 0
HOW OFTEN DO YOU HAVE 6 OR MORE DRINKS ON ONE OCCASION: NEVER
SKIP TO QUESTIONS 9-10: 1
AUDIT-C TOTAL SCORE: 0
HOW OFTEN DO YOU HAVE A DRINK CONTAINING ALCOHOL: NEVER
HOW MANY STANDARD DRINKS CONTAINING ALCOHOL DO YOU HAVE ON A TYPICAL DAY: PATIENT DOES NOT DRINK

## 2024-12-03 ASSESSMENT — PAIN DESCRIPTION - LOCATION
LOCATION: LEG
LOCATION: GENERALIZED

## 2024-12-03 ASSESSMENT — PAIN DESCRIPTION - ORIENTATION: ORIENTATION: RIGHT;LEFT

## 2024-12-03 NOTE — Clinical Note
Sheath was exchanged in the right femoral vein with CATHETER SET, HEMODIALYSIS, RAMIREZ SPLIT II, DOUBLE LUMEN, W/CUFF, 14 FR, 32 CM. Peel away from TDC kit

## 2024-12-03 NOTE — H&P
History Of Present Illness  Batsheva Page is a 49 y.o. female with history of multiple medical problems including end-stage renal disease on hemodialysis: Monday, Wednesday, and Friday presenting with mental status changes, fever, and chills. She reports developing confusion with difficulty remembering things, disorientation, fever (Tmax 102), chills, malaise and generalized body aches 3-4 days ago. Her last HD session was yesterday and the dialysis center enoch blood cultures (currently showing gram +ve cocci in clusters) and wanted to send her directly to the ED of nearby [facility] but she declined because she preferred to go to [facility] hospital. Pt called EMS upon getting home and she was taken to [facility]. Her labs in the ED were remarkable mainly for a wbc of 17.7. CXR was read as showing a developing right basal infiltrate.  She was given a dose each of vancomycin and zosyn and was transferred to [facility] for further care and management.     Past Medical History  Past Medical History:   Diagnosis Date    Aortic valve replaced 2022    CHF (congestive heart failure) (Multi)     Chronic pain     Coronary artery disease     Disease of thyroid gland     ESRD (end stage renal disease) (Multi)     H/O mitral valve replacement 2013    and 2022    Heart disease     History of transfusion     Hypertension     Mitral valve regurgitation     Seizures (Multi)     Stroke (Multi)        Past Surgical History  Past Surgical History:   Procedure Laterality Date    AORTIC VALVE REPLACEMENT  09/26/2022    bioprosthetic    CORONARY ARTERY BYPASS GRAFT      IR CVC  01/12/2024    exchange    IR CVC  05/07/2024    exchange    IR CVC  08/20/2024    removal    IR CVC TUNNELED  09/09/2022    IR CVC TUNNELED 9/9/2022 [facility]    IR CVC TUNNELED  12/28/2022    IR CVC TUNNELED 12/28/2022 [facility]    MITRAL VALVE REPAIR  2013    MITRAL VALVE REPLACEMENT  09/26/2022     bioprosthetic    PARATHYROIDECTOMY      TRICUSPID VALVULOPLASTY  2013    US GUIDED PERCUTANEOUS PLACEMENT  07/14/2022    US GUIDED PERCUTANEOUS PLACEMENT [facility]        Social History  She reports that she has never smoked. She has never used smokeless tobacco. She reports that she does not drink alcohol and does not use drugs.    Family History  Family History   Problem Relation Name Age of Onset    No Known Problems Mother      No Known Problems Father          Allergies  Kayexalate, Metoclopramide hcl, Prochlorperazine, Zofran [ondansetron hcl], and Ondansetron    Medications  Current Facility-Administered Medications on File Prior to Encounter   Medication Dose Route Frequency Provider Last Rate Last Admin    [COMPLETED] acetaminophen (Tylenol) tablet 1,000 mg  1,000 mg oral Once Farideh LANETTE GELACIO Seo   1,000 mg at 12/02/24 1606    [COMPLETED] diazePAM (Valium) injection 5 mg  5 mg intravenous Once Chencho Gomez, DO   5 mg at 12/02/24 2351    [COMPLETED] morphine injection 2 mg  2 mg intravenous Once Antonio Chaudhry DO   2 mg at 12/02/24 1719    [COMPLETED] morphine injection 2 mg  2 mg intravenous Once Antonio Chaudhry, DO   2 mg at 12/02/24 2220    [COMPLETED] piperacillin-tazobactam (Zosyn) 3.375 g in dextrose (iso) IV 50 mL  3.375 g intravenous Once Farideh Seo PA-C   Stopped at 12/02/24 1644    [COMPLETED] sodium chloride 0.9 % bolus 2,040 mL  30 mL/kg intravenous Once Faridehbabita Seo PA-C   Stopped at 12/02/24 1849    [COMPLETED] sodium chloride 0.9 % bolus 500 mL  500 mL intravenous Once Antonio Chaudhry  mL/hr at 12/02/24 2156 500 mL at 12/02/24 2156    [COMPLETED] vancomycin (Xellia) 1,750 mg in diluent combination  mL  1,750 mg intravenous Once Antonio VU Ramirezzeciak DO   Stopped at 12/02/24 1850    [DISCONTINUED] orphenadrine (Norflex) injection 60 mg  60 mg intravenous Once Antonio VU Chatmanciak, DO        [DISCONTINUED] vancomycin (Vancocin) pharmacy to dose -  pharmacy monitoring   miscellaneous Once Farideh Seo PA-C        [DISCONTINUED] vancomycin (Xellia) 1 g in diluent combination  mL  1 g intravenous Once Antonio Chaudhry, DO         Current Outpatient Medications on File Prior to Encounter   Medication Sig Dispense Refill    acetaminophen (Tylenol) 325 mg tablet Take 2 tablets (650 mg) by mouth every 6 hours as needed for mild pain (1 - 3). 30 tablet 0    aspirin 81 mg EC tablet Take 1 tablet (81 mg) by mouth once daily. 30 tablet 2    atorvastatin (Lipitor) 40 mg tablet Take 1 tablet (40 mg) by mouth once daily.      calcitriol (Rocaltrol) 0.25 mcg capsule Take 2 capsules (0.5 mcg) by mouth once daily.      cloNIDine (Catapres) 0.1 mg tablet Take 1 tablet (0.1 mg) by mouth every 8 hours. 90 tablet 3    epoetin brandy-epbx (RETACRIT) 20,000 unit/mL solution Inject 6,440 Units under the skin 3 times a week. Do not start before January 17, 2024. 30 mL 2    ergocalciferol (Vitamin D-2) 1.25 MG (29662 UT) capsule Take 1 capsule (1,250 mcg) by mouth 1 (one) time per week.      hydrALAZINE (Apresoline) 50 mg tablet Take 1 tablet (50 mg) by mouth 3 times a day.      levETIRAcetam (Keppra) 500 mg tablet Take 1.5 tablets (750 mg) by mouth once daily at bedtime.      metoprolol tartrate (Lopressor) 25 mg tablet Take 1 tablet (25 mg) by mouth 2 times a day. 60 tablet 0    mirtazapine (Remeron) 15 mg tablet Take 1 tablet (15 mg) by mouth once daily at bedtime. (Patient not taking: Reported on 9/19/2024) 30 tablet 0    oxyCODONE-acetaminophen (Percocet) 5-325 mg tablet Take 1 tablet by mouth every 6 hours as needed for moderate pain (4 - 6). 5 tablet 0    pregabalin (Lyrica) 25 mg capsule Take 2 capsules (50 mg) by mouth 2 times a day. 120 capsule 0    promethazine (Phenergan) 25 mg tablet Take 1 tablet (25 mg) by mouth once daily as needed.         Review of Systems   Constitutional:  Positive for appetite change, chills, fatigue and fever.        See HPI   HENT:  Negative.     Eyes: Negative.    Respiratory: Negative.     Cardiovascular: Negative.    Gastrointestinal: Negative.    Endocrine: Negative.    Genitourinary:         Has ESRD   Musculoskeletal:  Positive for myalgias.   Skin:  Positive for rash.   Allergic/Immunologic: Negative.    Neurological:         See HPI   Hematological: Negative.    Psychiatric/Behavioral:  Positive for confusion.         Physical Exam  Constitutional:       Appearance: She is ill-appearing. She is not toxic-appearing or diaphoretic.   HENT:      Head: Normocephalic and atraumatic.      Right Ear: External ear normal.      Left Ear: External ear normal.      Nose: Nose normal.      Mouth/Throat:      Mouth: Mucous membranes are dry.      Pharynx: Oropharynx is clear. No oropharyngeal exudate or posterior oropharyngeal erythema.   Eyes:      General: No scleral icterus.        Right eye: No discharge.         Left eye: No discharge.      Conjunctiva/sclera: Conjunctivae normal.   Cardiovascular:      Rate and Rhythm: Normal rate and regular rhythm.      Heart sounds: Murmur heard.      Comments: Loud systolic murmur  Pulmonary:      Breath sounds: No wheezing, rhonchi or rales.   Abdominal:      Palpations: There is no mass.      Tenderness: There is no abdominal tenderness. There is no right CVA tenderness, left CVA tenderness, guarding or rebound.   Musculoskeletal:      Cervical back: Neck supple.      Right lower leg: No edema.      Left lower leg: No edema.   Lymphadenopathy:      Cervical: No cervical adenopathy.   Skin:     General: Skin is warm and dry.      Findings: Rash present.      Comments: Areas of excoriation and scabbing on both LE   Neurological:      General: No focal deficit present.      Mental Status: She is alert and oriented to person, place, and time.   Psychiatric:         Mood and Affect: Mood normal.         Behavior: Behavior normal.          Last Recorded Vitals  Blood pressure (!) 87/46, pulse 78, temperature  "(!) 39.4 °C (103 °F), temperature source Temporal, resp. rate 22, height 1.676 m (5' 6\"), weight 71.5 kg (157 lb 10.1 oz), SpO2 99%.    Relevant Results   Latest Reference Range & Units 12/02/24 14:48 12/02/24 16:00   GLUCOSE 74 - 99 mg/dL 106 (H)    SODIUM 136 - 145 mmol/L 131 (L)    POTASSIUM 3.5 - 5.3 mmol/L 4.1    CHLORIDE 98 - 107 mmol/L 90 (L)    Bicarbonate 21 - 32 mmol/L 26    Anion Gap 10 - 20 mmol/L 19    Blood Urea Nitrogen 6 - 23 mg/dL 36 (H)    Creatinine 0.50 - 1.05 mg/dL 6.82 (H)    EGFR >60 mL/min/1.73m*2 7 (L)    Calcium 8.6 - 10.3 mg/dL 9.0    Albumin 3.4 - 5.0 g/dL 3.9    Alkaline Phosphatase 33 - 110 U/L 106    ALT 7 - 45 U/L 8    AST 9 - 39 U/L 18    Bilirubin Total 0.0 - 1.2 mg/dL 0.7    Total Protein 6.4 - 8.2 g/dL 8.5 (H)    MAGNESIUM 1.60 - 2.40 mg/dL 1.64    Lactate 0.4 - 2.0 mmol/L  1.4   LIPASE 9 - 82 U/L <3 (L)    HCG, Beta-Quantitative <5 mIU/mL 3    WBC 4.4 - 11.3 x10*3/uL 17.7 (H)    nRBC 0.0 - 0.0 /100 WBCs 0.0    RBC 4.00 - 5.20 x10*6/uL 3.88 (L)    HEMOGLOBIN 12.0 - 16.0 g/dL 11.1 (L)    HEMATOCRIT 36.0 - 46.0 % 35.9 (L)    MCV 80 - 100 fL 93    MCH 26.0 - 34.0 pg 28.6    MCHC 32.0 - 36.0 g/dL 30.9 (L)    RED CELL DISTRIBUTION WIDTH 11.5 - 14.5 % 17.2 (H)    Platelets 150 - 450 x10*3/uL 188    Neutrophils % 40.0 - 80.0 % 92.7    Immature Granulocytes %, Automated 0.0 - 0.9 % 1.2 (H)    Lymphocytes % 13.0 - 44.0 % 2.4    Monocytes % 2.0 - 10.0 % 3.6    Eosinophils % 0.0 - 6.0 % 0.0    Basophils % 0.0 - 2.0 % 0.1    Neutrophils Absolute 1.20 - 7.70 x10*3/uL 16.43 (H)    Immature Granulocytes Absolute, Automated 0.00 - 0.70 x10*3/uL 0.22    Lymphocytes Absolute 1.20 - 4.80 x10*3/uL 0.43 (L)    Monocytes Absolute 0.10 - 1.00 x10*3/uL 0.63    Eosinophils Absolute 0.00 - 0.70 x10*3/uL 0.00    Basophils Absolute 0.00 - 0.10 x10*3/uL 0.02    BLOOD CULTURE   Rpt (P)  Rpt (P)   Flu A Result Not Detected  Not Detected    Flu B Result Not Detected  Not Detected    Coronavirus 2019, PCR Not " Detected  Not Detected    (H): Data is abnormally high  (L): Data is abnormally low  (P): Preliminary  Rpt: View report in Results Review for more information    XR CHEST:     IMPRESSION:  1.  Developing right basal infiltrate. See discussion above. Possible  developing retrocardiac left basal infiltrate.    CT ABD/PELVIS:     IMPRESSION:  1.  Large amount of formed stool throughout the colon without wall  thickening or inflammatory change suggestive of constipation.  Clinical correlation recommended.  2. Nonspecific gallbladder distention without radiopaque calculi.  Unchanged mild splenomegaly. Correlation with liver enzymes is  recommended.  3. Unchanged 4.4 x 3.9 cm right ovarian cyst. Follow-up outpatient  ultrasound recommended.     Assessment/Plan   Acute metabolic encephalopathy  - Most likely secondary to bacteremia with line sepsis  - Treatment of underlying etiology  - Her mental status is currently improved  - Monitor    Sepsis  - Pt with gram +ve cocci bacteremia and likely line sepsis (has tunneled left subclavian HD catheter)  - Continue empiric antibiotic therapy with vancomycin and zosyn  - Volume resuscitation  - ID consulted    Possible pneumonia  - CXR read as showing a developing right basal infiltrate and a possible developing retrocardiac left basal infiltrate  - Pt has no respiratory symptoms  - Will await final blood culture ID and sensitivity and will check urine for legionella and strep antigens  - On vancomycin and zosyn    ESRD on HD  - Pt is on the Monday, Wednesday, Friday schedule  - Nephrology consulted    Hypertension  - BP meds will be held for now because BP is currently low due to #2  - Monitor     Constipation/fecal impaction  - Stool softeners and laxatives       I spent 75 minutes in the professional and overall care of this patient.      Roselia Farais MD

## 2024-12-03 NOTE — PROGRESS NOTES
Vancomycin Dosing by Pharmacy- INITIAL    Batsheva Page is a 49 y.o. year old female who Pharmacy has been consulted for vancomycin dosing for line infections. Based on the patient's indication and renal status this patient will be dosed based on a goal pre-HD level of 20-25.     Renal function is currently declining.    Visit Vitals  BP (!) 87/46 (BP Location: Left leg, Patient Position: Lying) Comment: rn aware   Pulse 78   Temp (!) 39.4 °C (103 °F) (Temporal) Comment: rn aware   Resp 22        Lab Results   Component Value Date    CREATININE 6.82 (H) 2024    CREATININE 6.20 (H) 2024    CREATININE 9.20 (H) 2024    CREATININE 10.10 (H) 2024        Patient weight is as follows:   Vitals:    24 0102   Weight: 71.5 kg (157 lb 10.1 oz)       Cultures:  No results found for the encounter in last 14 days.        No intake/output data recorded.  I/O during current shift:  No intake/output data recorded.    Temp (24hrs), Av.6 °C (99.7 °F), Min:36.7 °C (98.1 °F), Max:39.4 °C (103 °F)         Assessment/Plan     Patient has already been given a loading dose of 1750 mg.  Will initiate vancomycin maintenance, 750mg after dialysis.  Follow-up level will be ordered on  at 5a unless clinically indicated sooner.  Will continue to monitor renal function daily while on vancomycin and order serum creatinine at least every 48 hours if not already ordered.  Follow for continued vancomycin needs, clinical response, and signs/symptoms of toxicity.       Hilario Razo, PharmD

## 2024-12-03 NOTE — PROGRESS NOTES
12/03/24 0953   Discharge Planning   Living Arrangements Spouse/significant other   Support Systems Spouse/significant other;Children;Family members   Assistance Needed Patient is A&OX3, independent in ADLs, ambulates without assistive devices unless a lot of walking will use a cane, does not drive but family able to transport, goes to dialysis M, W, F at 0500 at [facility] [place], no active HC agency, No O2, CPAP or Bipap at baseline.   Type of Residence Private residence   Number of Stairs to Enter Residence 4   Number of Stairs Within Residence 0   Do you have animals or pets at home? No   Who is requesting discharge planning? Provider   Home or Post Acute Services None   Expected Discharge Disposition Home  (Home, No needs-denied need for HHC)   Does the patient need discharge transport arranged? No   Financial Resource Strain   How hard is it for you to pay for the very basics like food, housing, medical care, and heating? Not very   Housing Stability   In the last 12 months, was there a time when you were not able to pay the mortgage or rent on time? N   In the past 12 months, how many times have you moved where you were living? 0   At any time in the past 12 months, were you homeless or living in a shelter (including now)? N   Transportation Needs   In the past 12 months, has lack of transportation kept you from medical appointments or from getting medications? no   In the past 12 months, has lack of transportation kept you from meetings, work, or from getting things needed for daily living? No   Stroke Family Assessment   Stroke Family Assessment Needed No   Intensity of Service   Intensity of Service 0-30 min

## 2024-12-03 NOTE — ED PROVIDER NOTES
This is a 49-year-old female with a past medical history of ESRD on dialysis, HLD, seizure disorder, chronic anemia, anxiety and depression, history of endocarditis and multiple prior hospital admissions for dialysis line infections and bacteremia presenting to the ED today for evaluation of bodyaches, chills, fatigue for 1 week.  She has dialysis Monday Wednesday and Friday and had dialysis this morning.  Patient's  states that she seemed confused this morning which has since resolved, she complains of diffuse bodyaches and general weakness, intermittent subjective chills and fevers but no measured fevers at home.  She denies any bleeding or drainage, redness around the dialysis site which she has had previously with dialysis port infections.  She denies any vomiting or diarrhea, any chest pain or shortness of breath associated with her symptoms.  She has had a cough which has been nonproductive for the past few days as well.    Patient arrives to the ED with borderline blood pressures in the 80s over 50s, she states her baseline blood pressures are usually in the 100s systolic.  Given her body aches and systemic signs of illness, sepsis workup was ordered and she was given 30 cc/KG sepsis fluid bolus.  Her blood pressure did improve with systolic BPs in the 100s after IV fluids.  During her stay in the ED she did get an additional 500 cc of fluid when her blood pressure dipped again with improvement back up into the 1 teens.    She was treated twice with IV morphine 2 mg for severe diffuse bodyaches.  She was covered empirically with Zosyn for antibiotic coverage and was found to have leukocytosis of 17 with evidence of pneumonia on chest x-ray.  I personally reviewed the chest x-ray showing right lower lobe infiltrate and radiology was concerned for possible retrocardiac infiltrate developing as well.  Given that she was complaining of some diffuse abdominal cramping and abdominal CT was obtained as well  in the ED which was unremarkable for constipation, chronic and unchanged right ovarian cyst and splenomegaly, nonspecific gallbladder distention without radiopaque calculi.  She has no significant right upper quadrant tenderness, no rigidity or guarding on exam, no elevation in LFTs or bilirubin.  I do not feel that this likely represents cholecystitis clinically.    Sepsis reperfusion exam was performed at 20:26 after her initial fluid bolus with improvement of her blood pressure. Given lack of bed availability at this hospital she was transferred to [facility] for further treatment of pneumonia with concern for possible underlying bacteremia given her history.    ED Course as of 12/03/24 1512   Mon Dec 02, 2024   1502 EKG on my interpretation shows normal sinus rhythm, rate of 71 beats minute.  Normal axis.  QTc 456 ms, TN interval 176.  No ST elevation or depression, no acute ischemic pattern.  No STEMI.  Normal EKG. [NT]      ED Course User Index  [NT] Antonio Chaudhry DO         Diagnoses as of 12/03/24 1512   Pneumonia of right lower lobe due to infectious organism         Physical Exam  General: well developed, well nourished 49-year-old female who is awake and alert, oriented x 4, ill-appearing, in no apparent distress.  Eyes: sclera clear bilaterally, PERRL, EOMI  HENT: normocephalic, atraumatic. Pharynx without erythema or exudates, uvula midline.  CV: regular rate and rhythm, no murmur, no gallops, or rubs.  Patient has bilateral upper extremity fistulas.  Resp: clear to ascultation bilaterally, no wheezes, rales, or rhonchi  GI: abdomen soft, mild diffuse tenderness to palpation without rigidity or guarding, no peritoneal signs, abdomen is nondistended, no masses palpated  MSK: no swelling of the extremities.  Psych: appropriate mood and affect, cooperative with exam.  Dialysis catheter in right upper chest wall is without drainage or erythema, without tenderness surrounding the area.  Clean dressing  is and placed on my evaluation.  Skin: warm, dry, without evidence of rash or abrasions    CT abdomen pelvis wo IV contrast   Final Result   1.  Large amount of formed stool throughout the colon without wall   thickening or inflammatory change suggestive of constipation.   Clinical correlation recommended.   2. Nonspecific gallbladder distention without radiopaque calculi.   Unchanged mild splenomegaly. Correlation with liver enzymes is   recommended.   3. Unchanged 4.4 x 3.9 cm right ovarian cyst. Follow-up outpatient   ultrasound recommended.             Signed by: Harinder De La Rosa 12/2/2024 8:03 PM   Dictation workstation:   [id number]      XR chest 1 view   Final Result   1.  Developing right basal infiltrate. See discussion above. Possible   developing retrocardiac left basal infiltrate.                  MACRO:   None        Signed by: Joseph Schoenberger 12/2/2024 3:59 PM   Dictation workstation:   [id number]        Labs Reviewed   BLOOD CULTURE - Abnormal       Result Value    Blood Culture        Value: Identification and susceptibility testing to follow    Gram Stain Gram positive cocci, clusters (*)    BLOOD CULTURE - Abnormal    Blood Culture        Value: Identification and susceptibility testing to follow    Gram Stain Gram positive cocci, clusters (*)    CBC WITH AUTO DIFFERENTIAL - Abnormal    WBC 17.7 (*)     nRBC 0.0      RBC 3.88 (*)     Hemoglobin 11.1 (*)     Hematocrit 35.9 (*)     MCV 93      MCH 28.6      MCHC 30.9 (*)     RDW 17.2 (*)     Platelets 188      Neutrophils % 92.7      Immature Granulocytes %, Automated 1.2 (*)     Lymphocytes % 2.4      Monocytes % 3.6      Eosinophils % 0.0      Basophils % 0.1      Neutrophils Absolute 16.43 (*)     Immature Granulocytes Absolute, Automated 0.22      Lymphocytes Absolute 0.43 (*)     Monocytes Absolute 0.63      Eosinophils Absolute 0.00      Basophils Absolute 0.02     COMPREHENSIVE METABOLIC PANEL - Abnormal    Glucose 106 (*)     Sodium 131  (*)     Potassium 4.1      Chloride 90 (*)     Bicarbonate 26      Anion Gap 19      Urea Nitrogen 36 (*)     Creatinine 6.82 (*)     eGFR 7 (*)     Calcium 9.0      Albumin 3.9      Alkaline Phosphatase 106      Total Protein 8.5 (*)     AST 18      Bilirubin, Total 0.7      ALT 8     LIPASE - Abnormal    Lipase <3 (*)     Narrative:     Venipuncture immediately after or during the administration of Metamizole may lead to falsely low results. Testing should be performed immediately prior to Metamizole dosing.   MAGNESIUM - Normal    Magnesium 1.64     HUMAN CHORIONIC GONADOTROPIN, SERUM QUANTITATIVE - Normal    HCG, Beta-Quantitative 3      Narrative:      Total HCG measurement is performed using the [employer] Access   Immunoassay which detects intact HCG and free beta HCG subunit.    This test is not indicated for use as a tumor marker.   HCG testing is performed using a different test methodology at [facility] than other [facility]. Direct result comparison   should only be made within the same method.       SARS-COV-2 PCR - Normal    Coronavirus 2019, PCR Not Detected      Narrative:     This assay has received FDA Emergency Use Authorization (EUA) and is only authorized for the duration of time that circumstances exist to justify the authorization of the emergency use of in vitro diagnostic tests for the detection of SARS-CoV-2 virus and/or diagnosis of COVID-19 infection under section 564(b)(1) of the Act, 21 U.S.C. 360bbb-3(b)(1). This assay is an in vitro diagnostic nucleic acid amplification test for the qualitative detection of SARS-CoV-2 from nasopharyngeal specimens and has been validated for use at [facility]. Negative results do not preclude COVID-19 infections and should not be used as the sole basis for diagnosis, treatment, or other management decisions.     INFLUENZA A AND B PCR - Normal    Flu A Result Not Detected      Flu B Result Not Detected       Narrative:     This assay is an in vitro diagnostic multiplex nucleic acid amplification test for the detection and discrimination of Influenza A & B from nasopharyngeal specimens, and has been validated for use at [facility]. Negative results do not preclude Influenza A/B infections, and should not be used as the sole basis for diagnosis, treatment, or other management decisions. If Influenza A/B and RSV PCR results are negative, testing for Parainfluenza virus, Adenovirus and Metapneumovirus is routinely performed for [facility] pediatric oncology and intensive care inpatients, and is available on other patients by placing an add-on request.   LACTATE - Normal    Lactate 1.4      Narrative:     Venipuncture immediately after or during the administration of Metamizole may lead to falsely low results. Testing should be performed immediately prior to Metamizole dosing.   BLOOD CULTURE          Antonio Chaudhry DO  12/03/24 1514

## 2024-12-03 NOTE — CONSULTS
Consults    Reason For Consult  CLABSI    History Of Present Illness  Batsheva Page is a 49 y.o. female w/ PMH of ESRD on HD (M,W,F), multiple line associated infections and endocarditis s/p bioprosthetic valve replacements presented with acute mental status changes, fever, chills, nausea/vomiting, malaise and generalized body aches. Blood cultures show gram +ve cocci in clusters. She is currently on Vancomycin and Zosyn being treated for CLABSI. CXR also suggests developing pneumonia but is less concerning. No chest pain, SOB, abdominal pain, diarrhea, constipation or urinary symptoms.     Past Medical History  She has a past medical history of Aortic valve replaced (2022), CHF (congestive heart failure), Chronic pain, Coronary artery disease, Disease of thyroid gland, ESRD (end stage renal disease) (Multi), H/O mitral valve replacement (2013), Heart disease, History of transfusion, Hypertension, Mitral valve regurgitation, Seizures (Multi), and Stroke (Multi).    Surgical History  She has a past surgical history that includes IR CVC tunneled (09/09/2022); IR CVC tunneled (12/28/2022); US guided percutaneous placement (07/14/2022); Parathyroidectomy; Coronary artery bypass graft; Aortic valve replacement (09/26/2022); Mitral valve replacement (09/26/2022); Mitral valve repair (2013); Tricuspid valvuloplasty (2013); IR CVC (01/12/2024); IR CVC (05/07/2024); and IR CVC (08/20/2024).     Social History  She reports that she has never smoked. She has never used smokeless tobacco. She reports that she does not drink alcohol and does not use drugs.  Influenza vaccine is planned  Depression screen is negative    Family History  Family History   Problem Relation Name Age of Onset    No Known Problems Mother      No Known Problems Father          Allergies  Kayexalate, Metoclopramide hcl, Prochlorperazine, Zofran [ondansetron hcl], and Ondansetron    Review of Systems  Review of Systems   Constitutional:  Negative  for fatigue and fever.   Respiratory:  Negative for shortness of breath.    Cardiovascular:  Negative for chest pain.   Gastrointestinal:  Positive for nausea and vomiting. Negative for abdominal pain.   Genitourinary:  Negative for difficulty urinating.      Physical Exam  Vitals and nursing note reviewed.   HENT:      Head: Normocephalic.      Nose: Nose normal.      Mouth/Throat:      Mouth: Mucous membranes are moist.   Eyes:      Pupils: Pupils are equal, round, and reactive to light.   Cardiovascular:      Rate and Rhythm: Normal rate and regular rhythm.      Pulses: Normal pulses.      Heart sounds: Murmur heard.   Pulmonary:      Effort: Pulmonary effort is normal.   Abdominal:      General: Bowel sounds are normal.      Palpations: Abdomen is soft.   Skin:     General: Skin is warm and dry.      Findings: Rash present.      Comments: Areas of excoriation and scabbing on both LE   Neurological:      General: No focal deficit present.      Mental Status: She is alert.       Last Recorded Vitals  /88 (BP Location: Right leg, Patient Position: Lying)   Pulse 61   Temp 35.8 °C (96.5 °F) (Temporal)   Resp 19   Wt 71.5 kg (157 lb 10.1 oz)   SpO2 98%   Nutritional consult    Relevant Results  Results from last 7 days   Lab Units 12/03/24  0533   WBC AUTO x10*3/uL 13.3*   HEMOGLOBIN g/dL 9.7*   HEMATOCRIT % 33.2*   PLATELETS AUTO x10*3/uL 142*     Results from last 7 days   Lab Units 12/03/24  0533   SODIUM mmol/L 132*   POTASSIUM mmol/L 4.5   CHLORIDE mmol/L 95*   CO2 mmol/L 20*   BUN mg/dL 42*   CREATININE mg/dL 7.60*   EGFR mL/min/1.73m*2 6*   GLUCOSE mg/dL 108*   CALCIUM mg/dL 7.6*     Results from last 7 days   Lab Units 12/02/24  1448   ALK PHOS U/L 106   BILIRUBIN TOTAL mg/dL 0.7   PROTEIN TOTAL g/dL 8.5*   ALT U/L 8   AST U/L 18        Assessment/Plan   49 y.o. female w/ PMH of ESRD on HD (M,W,F), multiple line associated infections and endocarditis s/p bioprosthetic valve replacements presented  with symptoms of sepsis likely 2/2 CLABSI. Blood cultures show gram positive cocci in clusters. Currently on Vancomycin and Zosyn but can deescalate to just Vancomycin. Less concern for pneumonia on review on CXR. CT does not show concerns for pneumonia in lower lobes.    #Acute Metabolic Encephalopathy  #Sepsis  #CLABSI  - Continue Vancomycin  - Reports itching due to vancomycin - Areas of excoriation and scabbing noted in bilateral lower extremities. Can continue Benadryl which patient usually takes   - Discontinue Zosyn as Vancomycin coverage is sufficient    Rachid Lee MD  Internal Medicine, PGY-2          Attending note : the patient was evaluated, the note was reviewed and updated  Sepsis / bacteremia, likely dialysis line related, she has AVR / MVR  Atelectasis  Recommendations :  Continue Vancomycin  Repeat the BC  Echo  Chest PT  Inflammatory markers    Richard Gardiner M.D

## 2024-12-03 NOTE — CARE PLAN
The patient's goals for the shift include      The clinical goals for the shift include remain hds    Over the shift, the patient did not make progress toward the following goals. Barriers to progression include patient has a fever and is currently being treated for sepsis. Recommendations to address these barriers include continue with plan of care.

## 2024-12-03 NOTE — CONSULTS
Reason For Consult  ESRD management    History Of Present Illness  Batsheva Page is a 49 y.o. female presenting with symptoms of sepsis concerning for CLABSI-recurrent issues.    Patient was seen and examined today in her room.  She is TTS schedule under Dr. Ronny macias.  Did not miss dialysis.  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  Very hard stick.  Anuric.  Not again for kidney transplantation     NEPHROLOGY NEW CONSULT NOTE     Patient ID: Batsheva Page is a 49 y.o. female.     Reason for consult: ESRD-HD    No chief complaint on file.       HPI  Batsheva Page is a 49 y.o. female   - With past medical Hx as below   - Admitted for sepsis/infection  - Nephrology was consulted for ESRD management     Past Medical History:   Diagnosis Date    Aortic valve replaced 2022    CHF (congestive heart failure)     Chronic pain     Coronary artery disease     Disease of thyroid gland     ESRD (end stage renal disease) (Multi)     H/O mitral valve replacement 2013    and 2022    Heart disease     History of transfusion     Hypertension     Mitral valve regurgitation     Seizures (Multi)     Stroke (Multi)       Past Surgical History:   Procedure Laterality Date    AORTIC VALVE REPLACEMENT  09/26/2022    bioprosthetic    CORONARY ARTERY BYPASS GRAFT      IR CVC  01/12/2024    exchange    IR CVC  05/07/2024    exchange    IR CVC  08/20/2024    removal    IR CVC TUNNELED  09/09/2022    IR CVC TUNNELED 9/9/2022 [facility]    IR CVC TUNNELED  12/28/2022    IR CVC TUNNELED 12/28/2022 [facility] [place]    MITRAL VALVE REPAIR  2013    MITRAL VALVE REPLACEMENT  09/26/2022    bioprosthetic    PARATHYROIDECTOMY      TRICUSPID VALVULOPLASTY  2013    US GUIDED PERCUTANEOUS PLACEMENT  07/14/2022    US GUIDED PERCUTANEOUS PLACEMENT [facility]      Family History   Problem Relation Name Age of Onset    No Known Problems Mother      No Known Problems Father       Social History      Socioeconomic History    Marital status:      Spouse name: Not on file    Number of children: Not on file    Years of education: Not on file    Highest education level: Not on file   Occupational History    Not on file   Tobacco Use    Smoking status: Never    Smokeless tobacco: Never   Vaping Use    Vaping status: Never Used   Substance and Sexual Activity    Alcohol use: Never    Drug use: Never    Sexual activity: Not on file     Comment: post menopausal from dialysys   Other Topics Concern    Not on file   Social History Narrative    Not on file     Social Drivers of Health     Financial Resource Strain: Low Risk  (12/3/2024)    Overall Financial Resource Strain (CARDIA)     Difficulty of Paying Living Expenses: Not very hard   Food Insecurity: No Food Insecurity (12/3/2024)    Hunger Vital Sign     Worried About Running Out of Food in the Last Year: Never true     Ran Out of Food in the Last Year: Never true   Transportation Needs: No Transportation Needs (12/3/2024)    PRAPARE - Transportation     Lack of Transportation (Medical): No     Lack of Transportation (Non-Medical): No   Physical Activity: Inactive (12/3/2024)    Exercise Vital Sign     Days of Exercise per Week: 0 days     Minutes of Exercise per Session: 0 min   Stress: No Stress Concern Present (12/3/2024)    [employer] [place] [employer] - Occupational Stress Questionnaire     Feeling of Stress : Not at all   Social Connections: Moderately Isolated (4/29/2024)    Social Connection and Isolation Panel [NHANES]     Frequency of Communication with Friends and Family: More than three times a week     Frequency of Social Gatherings with Friends and Family: More than three times a week     Attends Church Services: More than 4 times per year     Active Member of Clubs or Organizations: No     Attends Club or Organization Meetings: Never     Marital Status:    Intimate Partner Violence: Not At Risk (12/3/2024)     "Humiliation, Afraid, Rape, and Kick questionnaire     Fear of Current or Ex-Partner: No     Emotionally Abused: No     Physically Abused: No     Sexually Abused: No   Housing Stability: Low Risk  (12/3/2024)    Housing Stability Vital Sign     Unable to Pay for Housing in the Last Year: No     Number of Times Moved in the Last Year: 0     Homeless in the Last Year: No     Allergies   Allergen Reactions    Kayexalate Seizure    Metoclopramide Hcl Other     anxious, agitated, \"skin crawl    Prochlorperazine Other     anxious, agitated, \"skin crawl    Zofran [Ondansetron Hcl] Headache    Ondansetron Other and Headache        Scheduled medications  aspirin, 81 mg, oral, Daily  atorvastatin, 40 mg, oral, Daily  calcitriol, 0.5 mcg, oral, Daily  docusate sodium, 100 mg, oral, BID  [START ON 12/8/2024] ergocalciferol, 1,250 mcg, oral, Every Sunday  lactulose, 20 g, oral, TID  levETIRAcetam, 750 mg, oral, Nightly  piperacillin-tazobactam, 2.25 g, intravenous, q8h  pregabalin, 50 mg, oral, BID  [START ON 12/4/2024] vancomycin, 750 mg, intravenous, Every Mon/Wed/Fri      Continuous medications     PRN medications  PRN medications: acetaminophen, dextromethorphan-guaifenesin, diphenhydrAMINE, guaiFENesin, hydrOXYzine HCL, morphine, oxyCODONE-acetaminophen, promethazine **OR** promethazine, vancomycin   Meds:   aspirin, 81 mg, Daily  atorvastatin, 40 mg, Daily  calcitriol, 0.5 mcg, Daily  docusate sodium, 100 mg, BID  [START ON 12/8/2024] ergocalciferol, 1,250 mcg, Every Sunday  lactulose, 20 g, TID  levETIRAcetam, 750 mg, Nightly  piperacillin-tazobactam, 2.25 g, q8h  pregabalin, 50 mg, BID  [START ON 12/4/2024] vancomycin, 750 mg, Every Mon/Wed/Fri         acetaminophen, 650 mg, q6h PRN  dextromethorphan-guaifenesin, 5 mL, q4h PRN  diphenhydrAMINE, 25 mg, q6h PRN  guaiFENesin, 600 mg, q12h PRN  hydrOXYzine HCL, 25 mg, q6h PRN  morphine, 1 mg, q3h PRN  oxyCODONE-acetaminophen, 1 tablet, q6h PRN  promethazine, 25 mg, q6h PRN   " "Or  promethazine, 25 mg, q12h PRN  vancomycin, , Daily PRN        Heart Rate:  [59-80]   Temp:  [35.8 °C (96.5 °F)-39.4 °C (103 °F)]   Resp:  [16-22]   BP: ()/(46-88)   Height:  [167.6 cm (5' 6\")]   Weight:  [71.5 kg (157 lb 10.1 oz)]   SpO2:  [94 %-100 %]    Weight: 71.5 kg (157 lb 10.1 oz)       General appearance: Awake and alert, oriented, . No distress  HEENT: supple, moist oral mucosa, no mouth ulcers  Neck: No JVD  Skin: no apparent rash  Heart: heart sounds 1 & 2 present and normal, no murmurs heard or friction rub  Lungs: Adequate air entry,  no wheezing/crackles  Abdomen: soft, non tender, no masses palpated, no flank tenderness  Extremities: No  edema, no joint swelling,  : deferred  Neuro: No FND, no asterixis   ACCESS: Tunneled hemodialysis catheter in place           Results from last 72 hours   Lab Units 12/03/24  0533 12/02/24  1448   SODIUM mmol/L 132* 131*   POTASSIUM mmol/L 4.5 4.1   CO2 mmol/L 20* 26   BUN mg/dL 42* 36*   CREATININE mg/dL 7.60* 6.82*   CALCIUM mg/dL 7.6* 9.0   ALBUMIN g/dL  --  3.9   GLUCOSE mg/dL 108* 106*   WBC AUTO x10*3/uL 13.3* 17.7*      Results for orders placed or performed during the hospital encounter of 12/03/24 (from the past 24 hours)   Basic metabolic panel   Result Value Ref Range    Glucose 108 (H) 74 - 99 mg/dL    Sodium 132 (L) 136 - 145 mmol/L    Potassium 4.5 3.5 - 5.3 mmol/L    Chloride 95 (L) 98 - 107 mmol/L    Bicarbonate 20 (L) 21 - 32 mmol/L    Anion Gap 22 (H) 10 - 20 mmol/L    Urea Nitrogen 42 (H) 6 - 23 mg/dL    Creatinine 7.60 (H) 0.50 - 1.05 mg/dL    eGFR 6 (L) >60 mL/min/1.73m*2    Calcium 7.6 (L) 8.6 - 10.3 mg/dL   CBC   Result Value Ref Range    WBC 13.3 (H) 4.4 - 11.3 x10*3/uL    nRBC 0.0 0.0 - 0.0 /100 WBCs    RBC 3.47 (L) 4.00 - 5.20 x10*6/uL    Hemoglobin 9.7 (L) 12.0 - 16.0 g/dL    Hematocrit 33.2 (L) 36.0 - 46.0 %    MCV 96 80 - 100 fL    MCH 28.0 26.0 - 34.0 pg    MCHC 29.2 (L) 32.0 - 36.0 g/dL    RDW 17.4 (H) 11.5 - 14.5 %    " Platelets 142 (L) 150 - 450 x10*3/uL       Results from last 7 days   Lab Units 12/03/24  0533 12/02/24  1448   WBC AUTO x10*3/uL 13.3* 17.7*   HEMOGLOBIN g/dL 9.7* 11.1*   HEMATOCRIT % 33.2* 35.9*   PLATELETS AUTO x10*3/uL 142* 188         A/P  ESRD on TTS chedule, @[facility], under care of Dr Jefferson  -HD admitted with CLABSI/sepsis.  Currently on vancomycin.  ID on board.  Plan for hemodialysis catheter exchange-discussed with ID today possibly over a guidewire as she is a very hard stick.  Pending repeat blood cultures    Plan HD tomorrow per submitted orders, UF   as tolerated  MBD -not on phosphate binders anemia of CKD: EPO to be given as an outpatient x 3 per week   Access: Infection  BP: acceptable during treatment   Renal Diet   Daily renal MVI   Continue 3 x per week hemodialysis; SW to ensure availability of o/p HD chair at discharge  Please communicate any antibiotic needs prior to discharge directly with the dialysis unit    Magdi Dahl MD

## 2024-12-03 NOTE — PROGRESS NOTES
[facility] Hospitalist Progress Note       [id number]: Please page me for patient care issues.  [phone]: Please page night hospitalist for any issues.     Batsheva Page  :  1975(49 y.o.)  MRN:  [id number]  PCP: Zhou Pedersen MD      Principal Problem:    Sepsis (Multi)      Assessment and Plan:     Batsheva Page is a 49 y.o. 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 Patient presented to the ED due to AMS fever (102oF) and rigor. Patient's  last HD session was 24 and the dialysis center enoch blood cultures was positive for gram +ve cocci in clusters  In the ED were remarkable mainly for a wbc of 17.7. CXR was read as showing a developing right basal infiltrate.  She was given a dose each of vancomycin and zosyn and was transferred to [facility] for further care and management.     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  #ESRD on HD (MWF)   #RLL infiltrate on CXR, asymptomatic  #Acute metabolic encephalopathy, resolved  #Overweight BMI 25-29.9, Body mass index is 25.44 kg/m².  -Vanc/zosyn->vanc pending repeat Bcx  -resume rest of home meds as indicated  -CS notes reviewed and recs appreciated: ID, nephrology     Disposition: await test results, await consultant recommendations, and await clinical improvement    DVT Prophylaxis: Subq Heparin    Code status: Full Code  Diet: Adult diet Regular, Renal; Potassium Restricted 2 gm (50mEq); 2 - 3 grams Sodium    Level of MDM:  High    patient and family updated, I personally examined the patient, and I personally reviewed chart, data, labs radiology reports    Family Communication  Number :   Name of Designated Family Representative:       Total time spent: 35 minutes, of total time providing  "counseling or in coordination of care. Total time on this day of visit includes record and documentation review before and after visit including documentation and time not explicitly included on EMR time stamp      Subjective:   Interval History:  HPI  The patient complains of bacteremia  The patient feels their symptoms areunchanged  no events or new concerns    Scheduled Meds:aspirin, 81 mg, oral, Daily  atorvastatin, 40 mg, oral, Daily  calcitriol, 0.5 mcg, oral, Daily  docusate sodium, 100 mg, oral, BID  [START ON 2024] ergocalciferol, 1,250 mcg, oral, Every   lactulose, 20 g, oral, TID  levETIRAcetam, 750 mg, oral, Nightly  piperacillin-tazobactam, 2.25 g, intravenous, q8h  pregabalin, 50 mg, oral, BID  [START ON 2024] vancomycin, 750 mg, intravenous, Every Mon/Wed/Fri      Continuous Infusions:   PRN Meds:PRN medications: acetaminophen, dextromethorphan-guaifenesin, diphenhydrAMINE, guaiFENesin, hydrOXYzine HCL, morphine, oxyCODONE-acetaminophen, promethazine **OR** promethazine, vancomycin    Review of Systems   All other systems reviewed and are negative.    Interval Pertinent History:  Social History     Tobacco Use    Smoking status: Never    Smokeless tobacco: Never   Substance Use Topics    Alcohol use: Never         Objective:   Patient Vitals for the past 24 hrs:   BP Temp Temp src Pulse Resp SpO2 Height Weight   24 1356 116/70 35.8 °C (96.5 °F) Temporal 62 18 96 % -- --   24 1004 135/88 35.8 °C (96.5 °F) Temporal 61 19 98 % -- --   24 0900 136/73 35.9 °C (96.6 °F) -- 63 18 97 % -- --   24 0520 119/59 36.9 °C (98.4 °F) -- 69 18 96 % -- --   24 0317 104/68 -- -- -- -- -- -- --   24 0245 (!) 85/48 -- -- -- -- -- -- --   24 0102 (!) 87/46 (!) 39.4 °C (103 °F) Temporal 78 22 99 % 1.676 m (5' 6\") 71.5 kg (157 lb 10.1 oz)       Average, Min, and Max for last 24 hours Vitals:  TEMPERATURE:  Temp  Av.8 °C (98.2 °F)  Min: 35.8 °C (96.5 °F)  Max: " 39.4 °C (103 °F)    RESPIRATIONS RANGE: Resp  Av.8  Min: 16  Max: 22    PULSE RANGE: Pulse  Av.1  Min: 59  Max: 80    BLOOD PRESSURE RANGE:  Systolic (24hrs), Av , Min:75 , Max:136   ; Diastolic (24hrs), Av, Min:46, Max:88      PULSE OXIMETRY RANGE: SpO2  Av.5 %  Min: 94 %  Max: 100 %  Body mass index is 25.44 kg/m².    No intake/output data recorded.  Weight change:      Physical Exam  Vitals and nursing note reviewed.   Constitutional:       Appearance: Normal appearance.   HENT:      Head: Normocephalic and atraumatic.      Right Ear: External ear normal.      Left Ear: External ear normal.      Nose: Nose normal.      Mouth/Throat:      Mouth: Mucous membranes are moist.   Eyes:      General: No scleral icterus.        Right eye: No discharge.         Left eye: No discharge.      Extraocular Movements: Extraocular movements intact.      Conjunctiva/sclera: Conjunctivae normal.      Pupils: Pupils are equal, round, and reactive to light.   Cardiovascular:      Rate and Rhythm: Normal rate and regular rhythm.   Pulmonary:      Effort: Pulmonary effort is normal.      Breath sounds: Normal breath sounds.   Chest:      Comments: Right anterior chest wall dialysis cath in place  Abdominal:      General: Abdomen is flat. Bowel sounds are normal.      Palpations: Abdomen is soft.   Musculoskeletal:         General: Normal range of motion.      Right lower leg: No edema.      Left lower leg: No edema.   Skin:     General: Skin is warm and dry.      Capillary Refill: Capillary refill takes less than 2 seconds.   Neurological:      General: No focal deficit present.   Psychiatric:         Mood and Affect: Mood normal.         Thought Content: Thought content normal.         Judgment: Judgment normal.         Lab Results   Component Value Date     (L) 2024    K 4.5 2024    CL 95 (L) 2024    CO2 20 (L) 2024    BUN 42 (H) 2024    CREATININE 7.60 (H) 2024     GLUCOSE 108 (H) 12/03/2024    CALCIUM 7.6 (L) 12/03/2024    PROT 8.5 (H) 12/02/2024    BILITOT 0.7 12/02/2024    ALKPHOS 106 12/02/2024    AST 18 12/02/2024    ALT 8 12/02/2024    GLOB 4.1 (H) 09/21/2023       Lab Results   Component Value Date    WBC 13.3 (H) 12/03/2024    HGB 9.7 (L) 12/03/2024    HCT 33.2 (L) 12/03/2024    MCV 96 12/03/2024     (L) 12/03/2024    LYMPHOPCT 2.4 12/02/2024    RBC 3.47 (L) 12/03/2024    MCH 28.0 12/03/2024    MCHC 29.2 (L) 12/03/2024    RDW 17.4 (H) 12/03/2024    CRP 9.90 (A) 12/25/2022       Lab Results   Component Value Date    TSH 2.12 04/28/2024     Lab Results   Component Value Date    LACTATE 1.4 12/02/2024    DDIMER 3.81 (H) 10/26/2022    INR 1.4 (H) 04/28/2024       IMAGES:  Encounter Date: 12/02/24   ECG 12 Lead   Result Value    Ventricular Rate 71    Atrial Rate 71    NJ Interval 176    QRS Duration 72    QT Interval 420    QTC Calculation(Bazett) 456    P Axis 65    R Axis 50    T Axis 55    QRS Count 12    Q Onset 221    P Onset 133    P Offset 190    T Offset 431    QTC Fredericia 444    Narrative    Normal sinus rhythm  Normal ECG  When compared with ECG of 20-SEP-2024 14:11,  Minimal criteria for Inferior infarct are no longer Present  T wave amplitude has decreased in Anterolateral leads        Transesophageal Echo (LAURA)    Result Date: 5/1/2024           [place] [address]            Phone [phone] TRANSESOPHAGEAL ECHOCARDIOGRAM REPORT  Patient Name:     RITASUSHILA Aguiar Physician:   [id number] Flores DO Study Date:       5/1/2024             Ordering Provider:   [id number] FLORES MRN/PID:          [id number]             Fellow: Accession#:       [id number]         Nurse: Date of           1975 / 48      Sonographer:         Dionte Vasquez RDCS Birth/Age:        years Gender:           F                    Additional Staff:  Height:           172.00 cm            Admit Date: Weight:           70.31 kg             Admission Status:    Inpatient - Routine BSA / BMI:        1.83 m2 / 23.77      Department Location: [facility] ICU                   kg/m2 Blood Pressure: 153 /72 mmHg Study Type:    TRANSESOPHAGEAL ECHO (LAURA) Diagnosis/ICD: Viral endocarditis-B33.21 Indication:    Endocarditis CPT Codes:     LAURA Complete-22116  Study Detail: The following Echo studies were performed: 2D, M-Mode and color               flow.  PHYSICIAN INTERPRETATION: LAURA Details: The LAURA probe used was [id number]. Technically adequate omniplane transesophageal echocardiogram performed. LAURA Medication: The patient was sedated by Anesthesia; please refer to anesthesia flow sheet for medications used. LAURA Procedure: The probe was passed without difficulty. The following complication was encountered during the procedure: Patient tolerated the procedure well without any apparent complications. Left Ventricle: Left ventricular systolic function is normal. There are no regional wall motion abnormalities. The left ventricular cavity size is normal. Left ventricular diastolic filling was indeterminate. Left Atrium: The left atrium is normal in size. There is a normal sized left atrial appendage, there is no thrombus visualized in the left atrial appendage and the left atrial appendage Doppler velocities are normal. Right Ventricle: The right ventricle is normal in size. There is normal right ventricular global systolic function. Right Atrium: The right atrium was not well visualized. Aortic Valve: There is a prosthetic aortic valve present. Echo findings are consistent with normal aortic valve prosthesis structure and function. There is no evidence of aortic valve regurgitation. Mitral Valve: There is a prosthetic mitral valve present. Echo findings are consistent with normal mitral valve prosthesis structure and function. There is trace mitral valve regurgitation. Trace  prosthetic mitral regurgitation. Mobile echodensity previously seen is not visualized. Tricuspid Valve: The tricuspid valve was not well visualized. Tricuspid regurgitation was not assessed. Pulmonic Valve: The pulmonic valve is not well visualized. The pulmonic valve regurgitation was not well visualized. Pericardium: There is no pericardial effusion noted. Aorta: The aortic root is normal.  CONCLUSIONS:  1. Left ventricular systolic function is normal. QUANTITATIVE DATA SUMMARY:  [id number] Fabian Skaggs DO Electronically signed on 5/1/2024 at 3:47:59 PM  ** Final **     Transthoracic Echo (TTE) Complete    Result Date: 4/29/2024           [place] [address]            Phone [phone] TRANSTHORACIC ECHOCARDIOGRAM REPORT  Patient Name:      RITA SAMY TEMPLE  Reading Physician:    [id number] Fabian Skaggs DO Study Date:        4/29/2024             Ordering Provider:    [id number] LIZAMA MRN/PID:           [id number]              Fellow: Accession#:        [id number]          Nurse: Date of Birth/Age: 1975 / 48 years Sonographer:          Dionte Vasquez RDCS Gender:            F                     Additional Staff: Height:            170.00 cm             Admit Date: Weight:            69.00 kg              Admission Status:     Inpatient -                                                                Routine BSA / BMI:         1.80 m2 / 23.88 kg/m2 Department Location:  [facility] ICU Blood Pressure: 144 /36 mmHg Study Type:    TRANSTHORACIC ECHO (TTE) COMPLETE Diagnosis/ICD: Endocarditis, valve unspecified-I38 Indication:    Endocarditis CPT Codes:     Echo Complete w Full Doppler-00576 Patient History: Pertinent History: CAD, Chest Pain, CHF, HTN and Hyperlipidemia.  Pacemaker,                    ESRD, MRSA, TVr-2013/Ring, MVR-2022/#27 [facility] [employer], AVR-2022/#21                    Insp[employer]. Study Detail: The following Echo studies were performed: 2D, M-Mode, Doppler and               color flow. Technically challenging study due to poor acoustic               windows and patient lying in supine position.  PHYSICIAN INTERPRETATION: Left Ventricle: Left ventricular systolic function is normal, with an estimated ejection fraction of 65-70%. There are no regional wall motion abnormalities. The left ventricular cavity size is normal. Spectral Doppler shows an impaired relaxation pattern of left ventricular diastolic filling. Left Atrium: The left atrium is normal in size. Right Ventricle: The right ventricle is normal in size. There is normal right ventricular global systolic function. Right Atrium: The right atrium is normal in size. Aortic Valve: There is a prosthetic aortic valve present. There is bioprosthetic aortic valve. Echo findings are consistent with normal aortic valve prosthesis structure and function. There is no evidence of aortic valve regurgitation. The peak instantaneous gradient of the aortic valve is 50.7 mmHg. The mean gradient of the aortic valve is 27.0 mmHg. Mitral Valve: There is a prosthetic mitral valve present. There is a normally functioning mitral valve prosthesis. There is no evidence of mitral valve regurgitation. Tricuspid Valve: The tricuspid valve is structurally normal. There is trace tricuspid regurgitation. Pulmonic Valve: The pulmonic valve is not well visualized. The pulmonic valve regurgitation was not well visualized. Pericardium: There is no pericardial effusion noted. Aorta: The aortic root is normal.  CONCLUSIONS:  1. Left ventricular systolic function is normal with a 65-70% estimated ejection fraction.  2. Spectral Doppler shows an impaired relaxation pattern of left ventricular diastolic filling.  3. There is a normally functioning mitral  valve prosthesis.  4. There is a bioprosthetic aortic valve. QUANTITATIVE DATA SUMMARY: 2D MEASUREMENTS:                          Normal Ranges: LAs:           3.30 cm   (2.7-4.0cm) IVSd:          0.99 cm   (0.6-1.1cm) LVPWd:         0.96 cm   (0.6-1.1cm) LVIDd:         3.93 cm   (3.9-5.9cm) LVIDs:         2.52 cm LV Mass Index: 66.4 g/m2 LV % FS        35.9 % LA VOLUME:                               Normal Ranges: LA Vol A4C:        53.4 ml    (22+/-6mL/m2) LA Vol A2C:        38.7 ml LA Vol BP:         47.0 ml LA Vol Index A4C:  29.7ml/m2 LA Vol Index A2C:  21.5 ml/m2 LA Vol Index BP:   26.2 ml/m2 LA Area A4C:       17.0 cm2 LA Area A2C:       14.0 cm2 LA Major Axis A4C: 4.6 cm LA Major Axis A2C: 4.3 cm LA Volume Index:   25.0 ml/m2 LA Vol A4C:        49.0 ml LA Vol A2C:        37.0 ml RA VOLUME BY A/L METHOD:                       Normal Ranges: RA Area A4C: 16.0 cm2 LV SYSTOLIC FUNCTION BY 2D PLANIMETRY (MOD):                     Normal Ranges: EF-A4C View: 59.6 % (>=55%) EF-A2C View: 65.1 % EF-Biplane:  61.8 % LV DIASTOLIC FUNCTION:                     Normal Ranges: MV Peak E: 1.56 m/s (0.7-1.2 m/s) MV Peak A: 1.58 m/s (0.42-0.7 m/s) E/A Ratio: 0.99     (1.0-2.2) MITRAL VALVE:                       Normal Ranges: MV Vmax:    1.66 m/s  (<=1.3m/s) MV peak P.0 mmHg (<5mmHg) MV mean P.0 mmHg  (<48mmHg) MV DT:      254 msec  (150-240msec) AORTIC VALVE:                                    Normal Ranges: AoV Vmax:                3.56 m/s  (<=1.7m/s) AoV Peak P.7 mmHg (<20mmHg) AoV Mean P.0 mmHg (1.7-11.5mmHg) LVOT Max Jamarcus:            1.41 m/s  (<=1.1m/s) AoV VTI:                 64.40 cm  (18-25cm) LVOT VTI:                30.60 cm AoV Dimensionless Index: 0.48  RIGHT VENTRICLE: RV Basal 3.39 cm RV Mid   2.40 cm RV Major 6.2 cm TAPSE:   17.5 mm RV s'    0.12 m/s TRICUSPID VALVE/RVSP:                             Normal Ranges: Peak TR Velocity: 2.58 m/s RV Syst Pressure: 29.6  mmHg (< 30mmHg) IVC Diam:         1.41 cm PULMONIC VALVE:                         Normal Ranges: PV Accel Time: 92 msec  (>120ms) PV Max Jamarcus:    1.2 m/s  (0.6-0.9m/s) PV Max P.9 mmHg  [id number] Flores DO Electronically signed on 2024 at 2:07:09 PM  ** Final **     Transesophageal Echo (LAURA)    Result Date: 2024           [place] [id number]            Phone [phone] TRANSESOPHAGEAL ECHOCARDIOGRAM REPORT  Patient Name:     RITA PABLO MAVERICK Reading Physician:   [id number] Flores DO Study Date:       2024            Ordering Provider:   [id number] FLORES MRN/PID:          [id number]             Fellow: Accession#:       [id number]         Nurse: Date of           1975      Sonographer:         Dionte Vasquez RDCS Birth/Age:        years Gender:           F                    Additional Staff: Height:           165.00 cm            Admit Date: Weight:           65.00 kg             Admission Status:    Inpatient - Routine BSA:              1.72 m2              Department Location: [facility] Blood Pressure: 142 /50 mmHg Study Type:    TRANSESOPHAGEAL ECHO (LAURA) Diagnosis/ICD: Viral endocarditis-B33.21 Indication:    Endocarditis CPT Codes:     LAURA Complete-32215 Patient History: Pertinent History: HTN, Hyperlipidemia, CAD and CHF. ESRD, Pacemaker, s/p                    Redo-sternotomy with TVr-ring repair, AVR #21 Inspirus, MVR                    #27 [facility] [employer] Epic Bioprosthesis. Study Detail: The following Echo studies were performed: 2D, color flow and               Doppler.  PHYSICIAN INTERPRETATION: LAURA Details: The LAURA probe used was [id number]. Technically adequate omniplane transesophageal echocardiogram performed. Color flow Doppler echo was performed to assess for the presence of a patent foramen ovale. LAURA Medication: The patient was sedated by Anesthesia;  please refer to anesthesia flow sheet for medications used. LAURA Procedure: The probe was passed without difficulty. Left Ventricle: Left ventricular systolic function is normal. There are no regional wall motion abnormalities. The left ventricular cavity size is normal. Left ventricular diastolic filling was not assessed. Left Atrium: The left atrium is normal in size. There is a normal sized left atrial appendage, the left atrial appendage Doppler velocities are normal and there is no thrombus visualized in the left atrial appendage. Right Ventricle: The right ventricle is normal in size. There is normal right ventricular global systolic function. Right Atrium: The right atrium is normal in size. Aortic Valve: There is no visualized aortic valve vegetation. There is a prosthetic aortic valve present. There is bioprosthetic aortic valve. There is no evidence of aortic valve regurgitation. Mitral Valve: There is a prosthetic mitral valve present. There is a mitral valve bioprosthesis. There is trace mitral valve regurgitation. There is a mobile echodensity at the base of the anterior leaflet suspicious for vegetation, decreased in size in comparison to 9/2023 study. There is a small perforation of this leaflet associated but overall the valve integrity is fairly well preserved. Tricuspid Valve: The tricuspid valve is structurally normal. There is trace tricuspid regurgitation. Pulmonic Valve: The pulmonic valve was not assessed. The pulmonic valve regurgitation was not assessed. Pericardium: There is no pericardial effusion noted. Aorta: The aortic root is normal.  CONCLUSIONS:  1. Left ventricular systolic function is normal.  2. There is a mobile echodensity at the base of the anterior leaflet suspicious for vegetation, decreased in size in comparison to 9/2023 study. There is a small perforation of this leaflet associated but overall the valve integrity is fairly well preserved.  3. No aortic valve vegetation  visualized.  4. There is a bioprosthetic aortic valve. QUANTITATIVE DATA SUMMARY:  [id number] Fabian Skaggs DO Electronically signed on 1/12/2024 at 1:32:47 PM  ** Final **     Transthoracic Echo (TTE) Complete    Result Date: 1/11/2024           [place] [id number]            Phone [phone] TRANSTHORACIC ECHOCARDIOGRAM REPORT  Patient Name:     RITA PABLO MAVERICK Reading Physician:   [id number] Fabian Skaggs DO Study Date:       1/10/2024            Ordering Provider:   [id number] MASSIEL NARANJO MRN/PID:          [id number]             Fellow: Accession#:       [id number]         Nurse: Date of           1975 / 48      Sonographer:         Lety Ivy RDCS Birth/Age:        years Gender:           F                    Additional Staff: Height:           165.10 cm            Admit Date: Weight:           63.96 kg             Admission Status:    Inpatient - Routine BSA:              1.71 m2              Department Location: [facility] Blood Pressure: 110 /36 mmHg Study Type:    TRANSTHORACIC ECHO (TTE) COMPLETE Diagnosis/ICD: Presence of prosthetic heart valve-Z95.2 Indication:    bacteremia CPT Codes:     Echo Complete w Full Doppler-87918 Patient History: Pacer/Defib:       Permanent pacemaker Pertinent History: HTN. s/p MVR, s/p AVR,bacteremia, PAF,ESRD, Anemia,                    CABG,endocarditis. Study Detail: The following Echo studies were performed: 2D, M-Mode, color flow               and Doppler. Technically challenging study due to dialysis cath lt               parasternal.  PHYSICIAN INTERPRETATION: Left Ventricle: Left ventricular systolic function is normal, with an estimated ejection fraction of 60-65%. There are no regional wall motion abnormalities. The left ventricular cavity size is normal. Spectral Doppler shows a normal pattern of left ventricular diastolic filling. Left Atrium: The left  atrium is normal in size. Right Ventricle: The right ventricle is normal in size. There is normal right ventricular global systolic function. Right Atrium: The right atrium is normal in size. Aortic Valve: There is no visualized aortic valve vegetation. There is a prosthetic aortic valve present. There is bioprosthetic aortic valve. There is no evidence of aortic valve regurgitation. The peak instantaneous gradient of the aortic valve is 33.2 mmHg. The mean gradient of the aortic valve is 19.0 mmHg. Mitral Valve: There is a prosthetic mitral valve present. There is a normally functioning mitral valve prosthesis. There was no mitral valve vegetation visualized. There is trace mitral valve regurgitation. Tricuspid Valve: The tricuspid valve is structurally normal. There is trace tricuspid regurgitation. Pulmonic Valve: The pulmonic valve is not well visualized. The pulmonic valve regurgitation was not well visualized. Pericardium: There is no pericardial effusion noted. Aorta: The aortic root is normal.  CONCLUSIONS:  1. Left ventricular systolic function is normal with a 60-65% estimated ejection fraction.  2. No mitral valve vegetation visualized.  3. There is a normally functioning mitral valve prosthesis.  4. No aortic valve vegetation visualized.  5. There is a bioprosthetic aortic valve. QUANTITATIVE DATA SUMMARY: 2D MEASUREMENTS:                          Normal Ranges: IVSd:          0.85 cm   (0.6-1.1cm) LVPWd:         0.72 cm   (0.6-1.1cm) LVIDd:         3.87 cm   (3.9-5.9cm) LV Mass Index: 50.5 g/m2 LV SYSTOLIC FUNCTION BY 2D PLANIMETRY (MOD):                     Normal Ranges: EF-A4C View: 60.9 % (>=55%) EF-A2C View: 65.0 % EF-Biplane:  62.9 % LV DIASTOLIC FUNCTION:                     Normal Ranges: MV Peak E: 1.87 m/s (0.7-1.2 m/s) MV Peak A: 1.01 m/s (0.42-0.7 m/s) E/A Ratio: 1.85     (1.0-2.2) MITRAL VALVE:                       Normal Ranges: MV Vmax:    1.78 m/s  (<=1.3m/s) MV peak P.7 mmHg  (<5mmHg) MV mean P.0 mmHg  (<48mmHg) MV DT:      320 msec  (150-240msec) AORTIC VALVE:                                    Normal Ranges: AoV Vmax:                2.88 m/s  (<=1.7m/s) AoV Peak P.2 mmHg (<20mmHg) AoV Mean P.0 mmHg (1.7-11.5mmHg) LVOT Max Jamarcus:            1.12 m/s  (<=1.1m/s) AoV VTI:                 62.50 cm  (18-25cm) LVOT VTI:                21.00 cm LVOT Diameter:           2.00 cm   (1.8-2.4cm) AoV Area, VTI:           1.06 cm2  (2.5-5.5cm2) AoV Area,Vmax:           1.22 cm2  (2.5-4.5cm2) AoV Dimensionless Index: 0.34 TRICUSPID VALVE/RVSP:                             Normal Ranges: Peak TR Velocity: 2.84 m/s RV Syst Pressure: 35.3 mmHg (< 30mmHg) IVC Diam:         1.28 cm  [id number] Fabian Skaggs DO Electronically signed on 2024 at 11:38:48 AM  ** Final **    === 24 ===    XR CHEST 1 VIEW    - Impression -  1.  Developing right basal infiltrate. See discussion above. Possible  developing retrocardiac left basal infiltrate.        MACRO:  None    Signed by: Joseph Schoenberger 2024 3:59 PM  Dictation workstation:   [id number]  === 24 ===    CT ABDOMEN PELVIS WO IV CONTRAST    - Impression -  1.  Large amount of formed stool throughout the colon without wall  thickening or inflammatory change suggestive of constipation.  Clinical correlation recommended.  2. Nonspecific gallbladder distention without radiopaque calculi.  Unchanged mild splenomegaly. Correlation with liver enzymes is  recommended.  3. Unchanged 4.4 x 3.9 cm right ovarian cyst. Follow-up outpatient  ultrasound recommended.      Signed by: Harinder De La Rosa 2024 8:03 PM  Dictation workstation:   [id number]  === 24 ===    XR CHEST 1 VIEW    - Impression -  1.  Developing right basal infiltrate. See discussion above. Possible  developing retrocardiac left basal infiltrate.        MACRO:  None    Signed by: Joseph Schoenberger 2024 3:59 PM  Dictation workstation:    [id number]      Additional results of the last 24 hours have been reviewed.    Dictated using [employer] Version 2.4  Proof read however unrecognized voice recognition errors may have occurred     Electronically signed by Blayne East DO on 12/03/24 at 2:21 PM

## 2024-12-03 NOTE — ED PROCEDURE NOTE
Procedure  Critical Care    Performed by: Antonio Chaudhry DO  Authorized by: Antonio Chaudhry DO    Critical care provider statement:     Critical care time (minutes):  26    Critical care time was exclusive of:  Separately billable procedures and treating other patients    Critical care was necessary to treat or prevent imminent or life-threatening deterioration of the following conditions:  Sepsis    Critical care was time spent personally by me on the following activities:  Ordering and review of laboratory studies, ordering and review of radiographic studies, ordering and performing treatments and interventions, re-evaluation of patient's condition, review of old charts, evaluation of patient's response to treatment, development of treatment plan with patient or surrogate, obtaining history from patient or surrogate and examination of patient    Care discussed with: accepting provider at another facility                 Antonio Chaudhry DO  12/03/24 [id number]

## 2024-12-04 ENCOUNTER — APPOINTMENT (OUTPATIENT)
Dept: DIALYSIS | Facility: HOSPITAL | Age: 49
End: 2024-12-04
Payer: MEDICARE

## 2024-12-04 ENCOUNTER — APPOINTMENT (OUTPATIENT)
Dept: CARDIOLOGY | Facility: HOSPITAL | Age: 49
End: 2024-12-04
Payer: MEDICARE

## 2024-12-04 LAB
ALBUMIN SERPL BCP-MCNC: 3.1 G/DL (ref 3.4–5)
ANION GAP SERPL CALC-SCNC: 26 MMOL/L (ref 10–20)
AORTIC VALVE MEAN GRADIENT: 18 MMHG
AORTIC VALVE PEAK VELOCITY: 2.87 M/S
AV PEAK GRADIENT: 33 MMHG
AVA (PEAK VEL): 1 CM2
AVA (VTI): 1.11 CM2
BASOPHILS # BLD AUTO: 0.03 X10*3/UL (ref 0–0.1)
BASOPHILS NFR BLD AUTO: 0.4 %
BUN SERPL-MCNC: 65 MG/DL (ref 6–23)
CALCIUM SERPL-MCNC: 7.5 MG/DL (ref 8.6–10.3)
CHLORIDE SERPL-SCNC: 94 MMOL/L (ref 98–107)
CO2 SERPL-SCNC: 18 MMOL/L (ref 21–32)
CREAT SERPL-MCNC: 9.33 MG/DL (ref 0.5–1.05)
EGFRCR SERPLBLD CKD-EPI 2021: 5 ML/MIN/1.73M*2
EJECTION FRACTION APICAL 4 CHAMBER: 61.7
EJECTION FRACTION: 68 %
EOSINOPHIL # BLD AUTO: 0.28 X10*3/UL (ref 0–0.7)
EOSINOPHIL NFR BLD AUTO: 3.7 %
ERYTHROCYTE [DISTWIDTH] IN BLOOD BY AUTOMATED COUNT: 17.5 % (ref 11.5–14.5)
GLUCOSE SERPL-MCNC: 91 MG/DL (ref 74–99)
HCT VFR BLD AUTO: 34 % (ref 36–46)
HGB BLD-MCNC: 10.6 G/DL (ref 12–16)
IMM GRANULOCYTES # BLD AUTO: 0.02 X10*3/UL (ref 0–0.7)
IMM GRANULOCYTES NFR BLD AUTO: 0.3 % (ref 0–0.9)
LEFT ATRIUM VOLUME AREA LENGTH INDEX BSA: 17.6 ML/M2
LEFT VENTRICLE INTERNAL DIMENSION DIASTOLE: 3.23 CM (ref 3.5–6)
LEFT VENTRICULAR OUTFLOW TRACT DIAMETER: 1.76 CM
LYMPHOCYTES # BLD AUTO: 0.76 X10*3/UL (ref 1.2–4.8)
LYMPHOCYTES NFR BLD AUTO: 10.1 %
MAGNESIUM SERPL-MCNC: 2.02 MG/DL (ref 1.6–2.4)
MCH RBC QN AUTO: 28.2 PG (ref 26–34)
MCHC RBC AUTO-ENTMCNC: 31.2 G/DL (ref 32–36)
MCV RBC AUTO: 90 FL (ref 80–100)
MITRAL VALVE E/A RATIO: 1.9
MONOCYTES # BLD AUTO: 0.58 X10*3/UL (ref 0.1–1)
MONOCYTES NFR BLD AUTO: 7.7 %
NEUTROPHILS # BLD AUTO: 5.83 X10*3/UL (ref 1.2–7.7)
NEUTROPHILS NFR BLD AUTO: 77.8 %
NRBC BLD-RTO: 0 /100 WBCS (ref 0–0)
PHOSPHATE SERPL-MCNC: 5.5 MG/DL (ref 2.5–4.9)
PLATELET # BLD AUTO: 146 X10*3/UL (ref 150–450)
POTASSIUM SERPL-SCNC: 4.7 MMOL/L (ref 3.5–5.3)
RBC # BLD AUTO: 3.76 X10*6/UL (ref 4–5.2)
RIGHT VENTRICLE FREE WALL PEAK S': 10 CM/S
RIGHT VENTRICLE PEAK SYSTOLIC PRESSURE: 35.5 MMHG
SODIUM SERPL-SCNC: 133 MMOL/L (ref 136–145)
TRICUSPID ANNULAR PLANE SYSTOLIC EXCURSION: 1.3 CM
WBC # BLD AUTO: 7.5 X10*3/UL (ref 4.4–11.3)

## 2024-12-04 PROCEDURE — 93005 ELECTROCARDIOGRAM TRACING: CPT

## 2024-12-04 PROCEDURE — 36415 COLL VENOUS BLD VENIPUNCTURE: CPT | Performed by: INTERNAL MEDICINE

## 2024-12-04 PROCEDURE — 2060000001 HC INTERMEDIATE ICU ROOM DAILY

## 2024-12-04 PROCEDURE — 5A1D70Z PERFORMANCE OF URINARY FILTRATION, INTERMITTENT, LESS THAN 6 HOURS PER DAY: ICD-10-PCS | Performed by: INTERNAL MEDICINE

## 2024-12-04 PROCEDURE — 2500000001 HC RX 250 WO HCPCS SELF ADMINISTERED DRUGS (ALT 637 FOR MEDICARE OP): Performed by: INTERNAL MEDICINE

## 2024-12-04 PROCEDURE — 2500000004 HC RX 250 GENERAL PHARMACY W/ HCPCS (ALT 636 FOR OP/ED): Performed by: INTERNAL MEDICINE

## 2024-12-04 PROCEDURE — 93306 TTE W/DOPPLER COMPLETE: CPT

## 2024-12-04 PROCEDURE — 85025 COMPLETE CBC W/AUTO DIFF WBC: CPT | Performed by: INTERNAL MEDICINE

## 2024-12-04 PROCEDURE — 83735 ASSAY OF MAGNESIUM: CPT | Performed by: INTERNAL MEDICINE

## 2024-12-04 PROCEDURE — 2500000004 HC RX 250 GENERAL PHARMACY W/ HCPCS (ALT 636 FOR OP/ED): Performed by: NURSE PRACTITIONER

## 2024-12-04 PROCEDURE — 93010 ELECTROCARDIOGRAM REPORT: CPT | Performed by: INTERNAL MEDICINE

## 2024-12-04 PROCEDURE — 93306 TTE W/DOPPLER COMPLETE: CPT | Performed by: STUDENT IN AN ORGANIZED HEALTH CARE EDUCATION/TRAINING PROGRAM

## 2024-12-04 PROCEDURE — 99232 SBSQ HOSP IP/OBS MODERATE 35: CPT | Performed by: INTERNAL MEDICINE

## 2024-12-04 PROCEDURE — 87040 BLOOD CULTURE FOR BACTERIA: CPT | Mod: GEALAB | Performed by: INTERNAL MEDICINE

## 2024-12-04 PROCEDURE — 87077 CULTURE AEROBIC IDENTIFY: CPT | Mod: GEALAB | Performed by: INTERNAL MEDICINE

## 2024-12-04 PROCEDURE — 8010000001 HC DIALYSIS - HEMODIALYSIS PER DAY

## 2024-12-04 PROCEDURE — 84100 ASSAY OF PHOSPHORUS: CPT | Performed by: INTERNAL MEDICINE

## 2024-12-04 RX ORDER — DIPHENHYDRAMINE HYDROCHLORIDE 50 MG/ML
25 INJECTION INTRAMUSCULAR; INTRAVENOUS ONCE
Status: COMPLETED | OUTPATIENT
Start: 2024-12-04 | End: 2024-12-04

## 2024-12-04 RX ORDER — MORPHINE SULFATE 2 MG/ML
1 INJECTION, SOLUTION INTRAMUSCULAR; INTRAVENOUS ONCE
Status: COMPLETED | OUTPATIENT
Start: 2024-12-04 | End: 2024-12-04

## 2024-12-04 RX ORDER — DIPHENHYDRAMINE HYDROCHLORIDE 50 MG/ML
50 INJECTION INTRAMUSCULAR; INTRAVENOUS DAILY PRN
Status: DISCONTINUED | OUTPATIENT
Start: 2024-12-04 | End: 2024-12-05

## 2024-12-04 RX ORDER — HEPARIN SODIUM 1000 [USP'U]/ML
1900 INJECTION, SOLUTION INTRAVENOUS; SUBCUTANEOUS
Status: DISPENSED | OUTPATIENT
Start: 2024-12-04

## 2024-12-04 RX ORDER — DIPHENHYDRAMINE HCL 25 MG
50 CAPSULE ORAL AS NEEDED
Status: DISCONTINUED | OUTPATIENT
Start: 2024-12-04 | End: 2024-12-04

## 2024-12-04 RX ORDER — DIPHENHYDRAMINE HCL 25 MG
25 CAPSULE ORAL ONCE
Status: DISCONTINUED | OUTPATIENT
Start: 2024-12-04 | End: 2024-12-04

## 2024-12-04 ASSESSMENT — PAIN SCALES - GENERAL
PAINLEVEL_OUTOF10: 9
PAINLEVEL_OUTOF10: 8
PAINLEVEL_OUTOF10: 10 - WORST POSSIBLE PAIN
PAINLEVEL_OUTOF10: 9
PAINLEVEL_OUTOF10: 10 - WORST POSSIBLE PAIN
PAINLEVEL_OUTOF10: 3
PAINLEVEL_OUTOF10: 8

## 2024-12-04 ASSESSMENT — COGNITIVE AND FUNCTIONAL STATUS - GENERAL
DAILY ACTIVITIY SCORE: 24
DRESSING REGULAR UPPER BODY CLOTHING: A LITTLE
TURNING FROM BACK TO SIDE WHILE IN FLAT BAD: A LITTLE
WALKING IN HOSPITAL ROOM: A LITTLE
MOVING TO AND FROM BED TO CHAIR: A LITTLE
MOVING FROM LYING ON BACK TO SITTING ON SIDE OF FLAT BED WITH BEDRAILS: A LITTLE
DRESSING REGULAR LOWER BODY CLOTHING: A LITTLE
TOILETING: A LITTLE
CLIMB 3 TO 5 STEPS WITH RAILING: A LITTLE
CLIMB 3 TO 5 STEPS WITH RAILING: A LITTLE
PERSONAL GROOMING: A LITTLE
HELP NEEDED FOR BATHING: A LITTLE
MOBILITY SCORE: 23
STANDING UP FROM CHAIR USING ARMS: A LITTLE
DAILY ACTIVITIY SCORE: 19
MOBILITY SCORE: 18

## 2024-12-04 ASSESSMENT — PAIN - FUNCTIONAL ASSESSMENT
PAIN_FUNCTIONAL_ASSESSMENT: 0-10

## 2024-12-04 ASSESSMENT — PAIN DESCRIPTION - LOCATION
LOCATION: GENERALIZED
LOCATION: LEG
LOCATION: LEG

## 2024-12-04 ASSESSMENT — PAIN DESCRIPTION - DESCRIPTORS: DESCRIPTORS: DISCOMFORT

## 2024-12-04 ASSESSMENT — PAIN DESCRIPTION - ORIENTATION
ORIENTATION: RIGHT;LEFT
ORIENTATION: LEFT;RIGHT

## 2024-12-04 NOTE — PROGRESS NOTES
Batsheva Page is a 49 y.o. female on day 1 of admission presenting with Sepsis (Multi).      Subjective   No acute overnight events. Still feels fatigued. Overall feels the same but able to think and talk coherently. Reduced appetite, regular BM's, no urinary symptoms.    Objective     Last Recorded Vitals  /74 (BP Location: Left leg, Patient Position: Lying)   Pulse 63   Temp 35.7 °C (96.3 °F) (Temporal)   Resp 16   Wt 74 kg (163 lb 3.2 oz)   SpO2 98%   Intake/Output last 3 Shifts:    Intake/Output Summary (Last 24 hours) at 12/4/2024 1303  Last data filed at 12/3/2024 1900  Gross per 24 hour   Intake 60 ml   Output --   Net 60 ml       Admission Weight  Weight: 71.5 kg (157 lb 10.1 oz) (12/03/24 0102)    Daily Weight  12/04/24 : 74 kg (163 lb 3.2 oz)    Image Results  Transthoracic Echo (TTE) [facility], [address] [place] [address]                Tel [phone] and Fax [phone]    TRANSTHORACIC ECHOCARDIOGRAM REPORT       Patient Name:       BATSHEVA PABLO          [facility] Physician:    [id number] Lyndsey Moncada MD  Study Date:         12/4/2024           Ordering Provider:    [id number] VICENTE FRANCO  MRN/PID:            [id number]            Fellow:  Accession#:         [id number]        Nurse:  Date of Birth/Age:  1975 / 49     Sonographer:          Yun hirsch RDCS  Gender assigned at  F                   Additional Staff:  Birth:  Height:             167.64 cm           Admit Date:           12/3/2024  Weight:             71.21 kg            Admission Status:     Inpatient -                                                                Routine  BSA / BMI:          1.80 m2 / 25.34     Encounter#:           [id number]                      kg/m2  Blood Pressure:      145/74 mmHg         Department Location:  [facility] Non                                                                Invasive    Study Type:    TRANSTHORACIC ECHO (TTE) COMPLETE  Diagnosis/ICD: 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  Pertinent History: Fever, CAD and ESRD,AV= Inspiris 21mm, MV= EPIC [place]                     27mm.    Study Detail: The following Echo studies were performed: 2D, M-Mode, Doppler and                color flow. Patient has a pacemaker.                The patient was awake.       PHYSICIAN INTERPRETATION:  Left Ventricle: The left ventricular systolic function is normal, with a visually estimated ejection fraction of 65-70%. The left ventricular cavity size is normal. There is mildly increased septal and mildly increased posterior left ventricular wall thickness. There is left ventricular concentric remodeling. Abnormal (paradoxical) septal motion consistent with post-operative status. Left ventricular diastolic filling cannot be determined, due to mitral valve repair/replacement.  Left Atrium: The left atrium was not well visualized.  Right Ventricle: The right ventricle is mildly enlarged. There is normal right ventricular global systolic function. RV free wall is not well visualized.  Right Atrium: The right atrium is normal in size.  Aortic Valve: There is a prosthetic aortic valve present. The aortic valve dimensionless index is 0.46. There is Inspiris bioprosthetic type aortic valve bioprosthesis with a 21 mm reported size. Echo findings are  consistent with normal aortic valve prosthesis structure and function. There is no evidence of aortic valve regurgitation. The peak instantaneous gradient of the aortic valve is 33 mmHg. The mean gradient of the aortic valve is 18 mmHg. The valve is not well visualized in the short axis views. No obvious vegetation.  Mitral Valve: There is a prosthetic mitral valve present. There is a [place]. Devonte bioprosthetic type mitral valve prosthesis with a 27 mm reported size. The peak and mean gradients are 13 mmHg and 6 mmHg respectively. The peak instantaneous gradient of the mitral valve is 13 mmHg. Echo findings are consistent with normal mitral valve prosthesis structure and function. There is trace mitral valve regurgitation. No obvious vegetation.  Tricuspid Valve: Status post tricuspid valve repair. There is trace to mild tricuspid regurgitation. The Doppler estimated RVSP is mildly elevated at 35.5 mmHg. Mean gradient is 2mmHg. No tricuspid stenosis. No obvious vegetation.  Pulmonic Valve: The pulmonic valve is not well visualized. There is physiologic pulmonic valve regurgitation.  Pericardium: There is no pericardial effusion noted.  Aorta: The aortic root is normal.  Systemic Veins: The inferior vena cava appears small in size, with IVC inspiratory collapse greater than 50%.       CONCLUSIONS:   1. Poorly visualized anatomical structures due to suboptimal image quality.   2. The left ventricular systolic function is normal, with a visually estimated ejection fraction of 65-70%.   3. Abnormal septal motion consistent with post-operative status.   4. There is normal right ventricular global systolic function.   5. Mildly enlarged right ventricle.   6. There is a [place]. Devonte bioprosthetic type mitral valve prosthesis with a 27 mm reported size. The peak and mean gradients are 13 mmHg and 6 mmHg respectively.   7. Status post tricuspid valve repair.   8. Mildly elevated right ventricular systolic pressure.   9. There is  Inspiris bioprosthetic type aortic valve bioprosthesis with a 21 mm reported size.  10. Echo findings are consistent with normal aortic valve prosthesis structure and function.  11. Echo findings are consistent with normal mitral valve prosthesis structure and function.    QUANTITATIVE DATA SUMMARY:     2D MEASUREMENTS:          Normal Ranges:  IVSd:            0.98 cm  (0.6-1.1cm)  LVPWd:           1.02 cm  (0.6-1.1cm)  LVIDd:           3.23 cm  (3.9-5.9cm)  LVIDs:           2.33 cm  LV Mass Index:   51 g/m2  LVEDV Index:     41 ml/m2  LV % FS          27.9 %       LA VOLUME:                    Normal Ranges:  LA Vol A4C:        32.3 ml    (22+/-6mL/m2)  LA Vol A2C:        25.1 ml  LA Vol BP:         31.8 ml  LA Vol Index A4C:  17.9 ml/m2  LA Vol Index A2C:  13.9 ml/m2  LA Vol Index BP:   17.6 ml/m2  LA Area A4C:       13.5 cm2  LA Area A2C:       10.7 cm2  LA Major Axis A4C: 4.8 cm  LA Major Axis A2C: 3.8 cm  LA Vol A4C:        27.3 ml  LA Vol A2C:        22.0 ml  LA Vol Index BSA:  13.7 ml/m2       RA VOLUME BY A/L METHOD:          Normal Ranges:  RA Area A4C:             16.4 cm2       AORTA MEASUREMENTS:         Normal Ranges:  Ao Sinus, d:        2.00 cm (2.1-3.5cm)  Asc Ao, d:          2.60 cm (2.1-3.4cm)       LV SYSTOLIC FUNCTION BY 2D PLANIMETRY (MOD):                       Normal Ranges:  EF-A4C View:    62 % (>=55%)  EF-A2C View:    72 %  EF-Biplane:     67 %  EF-Visual:      68 %  LV EF Reported: 68 %       LV DIASTOLIC FUNCTION:             Normal Ranges:  MV Peak E:             1.92 m/s    (0.7-1.2 m/s)  MV Peak A:             1.01 m/s    (0.42-0.7 m/s)  E/A Ratio:             1.90        (1.0-2.2)  MV e'                  0.060 m/s   (>8.0)  MV lateral e'          0.06 m/s  MV medial e'           0.06 m/s  MV A Dur:              117.03 msec  E/e' Ratio:            32.04       (<8.0)       MITRAL VALVE:           Normal Ranges:  MV Vmax:      1.79 m/s  (<=1.3m/s)  MV peak P.9 mmHg (<5mmHg)  MV  mean P.8 mmHg  (<2mmHg)  MV VTI:       49.37 cm  (10-13cm)  MV DT:        238 msec  (150-240msec)       AORTIC VALVE:                      Normal Ranges:  AoV Vmax:                2.87 m/s  (<=1.7m/s)  AoV Peak P.8 mmHg (<20mmHg)  AoV Mean P.2 mmHg (1.7-11.5mmHg)  LVOT Max Jamarcus:            1.18 m/s  (<=1.1m/s)  AoV VTI:                 53.97 cm  (18-25cm)  LVOT VTI:                24.65 cm  LVOT Diameter:           1.76 cm   (1.8-2.4cm)  AoV Area, VTI:           1.11 cm2  (2.5-5.5cm2)  AoV Area,Vmax:           1.00 cm2  (2.5-4.5cm2)  AoV Dimensionless Index: 0.46       RIGHT VENTRICLE:  RV Basal 4.20 cm  RV Mid   3.40 cm  RV Major 8.7 cm  TAPSE:   13.0 mm  RV s'    0.10 m/s       TRICUSPID VALVE/RVSP:          Normal Ranges:  Peak TR Velocity:     2.85 m/s  RV Syst Pressure:     36 mmHg  (< 30mmHg)  IVC Diam:             1.00 cm       PULMONIC VALVE:          Normal Ranges:  PV Max Jamarcus:     1.0 m/s  (0.6-0.9m/s)  PV Max PG:      3.8 mmHg       AORTA:  Asc Ao Diam 2.55 cm       [id number] Lyndsey Moncada MD  Electronically signed on 2024 at 12:54:13 PM       ** Final **      Physical Exam  Vitals and nursing note reviewed.   Constitutional:       Appearance: She is ill-appearing.   HENT:      Head: Normocephalic.      Nose: Nose normal.      Mouth/Throat:      Mouth: Mucous membranes are moist.   Eyes:      Pupils: Pupils are equal, round, and reactive to light.   Cardiovascular:      Rate and Rhythm: Normal rate and regular rhythm.      Pulses: Normal pulses.      Heart sounds: Murmur heard.   Pulmonary:      Effort: Pulmonary effort is normal.   Abdominal:      General: Bowel sounds are normal.      Palpations: Abdomen is soft.   Skin:     General: Skin is warm and dry.   Neurological:      General: No focal deficit present.      Mental Status: She is alert.         Relevant Results  Results from last 7 days   Lab Units 12/04/24  0623   WBC AUTO x10*3/uL 7.5   HEMOGLOBIN g/dL  10.6*   HEMATOCRIT % 34.0*   PLATELETS AUTO x10*3/uL 146*     Results from last 7 days   Lab Units 12/04/24  0623   SODIUM mmol/L 133*   POTASSIUM mmol/L 4.7   CHLORIDE mmol/L 94*   CO2 mmol/L 18*   BUN mg/dL 65*   CREATININE mg/dL 9.33*   EGFR mL/min/1.73m*2 5*   GLUCOSE mg/dL 91   CALCIUM mg/dL 7.5*   PHOSPHORUS mg/dL 5.5*     Results from last 7 days   Lab Units 12/02/24  1448   ALK PHOS U/L 106   BILIRUBIN TOTAL mg/dL 0.7   PROTEIN TOTAL g/dL 8.5*   ALT U/L 8   AST U/L 18     Assessment/Plan      49 y.o. 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 Blood cultures show gram positive cocci in clusters. Currently on Vancomycin.     #Acute Metabolic Encephalopathy  #Sepsis  #CLABSI  - Continue Vancomycin  - Cultures NGTD x 2 days  - Hemodialysis scheduled today  - Echo pending    Rachid Lee MD  Internal Medicine, PGY-2          Attending note : the patient was evaluated, the note was reviewed and updated  Sepsis / bacteremia, likely dialysis line related, she has AVR / MVR, MRSA, the repeat BC is positive, TTE without vegetations  Atelectasis  Recommendations :  Continue Vancomycin  Will likely need a LAURA  Discussed with the medical team     Richard Gardiner M.D

## 2024-12-04 NOTE — PRE-PROCEDURE NOTE
..Report from Sending RN:    Report From: FARAZ Rojas  Recent Surgery of Procedure: No  Baseline Level of Consciousness (LOC): x4  Oxygen Use: No  Type: room air  Diabetic: No  Last BP Med Given Day of Dialysis: SEE MAR  Last Pain Med Given: SEE MAR  Lab Tests to be Obtained with Dialysis: Yes, blood cultures  Blood Transfusion to be Given During Dialysis: No  Available IV Access: Yes  Medications to be Administered During Dialysis: No  Continuous IV Infusion Running: No  Restraints on Currently or in the Last 24 Hours: No  Hand-Off Communication: patient is stable and ready for tx.  Dialysis Catheter Dressing: clean and coming off  Last Dressing Change: 12/03/24

## 2024-12-04 NOTE — PROGRESS NOTES
12/04/24 1305   Discharge Planning   Living Arrangements Spouse/significant other   Support Systems Spouse/significant other;Children;Family members   Assistance Needed Patient is A&OX3, independent in ADLs, ambulates without assistive devices unless a lot of walking will use a cane, does not drive but family able to transport, goes to dialysis M, W, F at 0500 at [facility], no active HC agency, No O2, CPAP or Bipap at baseline.   Type of Residence Private residence   Number of Stairs to Enter Residence 4   Number of Stairs Within Residence 0   Do you have animals or pets at home? No   Who is requesting discharge planning? Provider   Home or Post Acute Services None   Expected Discharge Disposition Home  (denies any [facility] needs, here with sepsis/bacteremia, receiving IV antibiotics, repeat blood cultures pending.)   Does the patient need discharge transport arranged? No   Stroke Family Assessment   Stroke Family Assessment Needed No   Intensity of Service   Intensity of Service 0-30 min

## 2024-12-04 NOTE — POST-PROCEDURE NOTE
..Report to Receiving RN:    Report To: FARAZ Rojas  Time Report Called: 1540  Hand-Off Communication: Patient vitals 170/95 HR 95, temp 99.2 and fluid removal of 1L  Complications During Treatment: No  Ultrafiltration Treatment: No  Medications Administered During Dialysis: Yes, SEE MAR  Blood Products Administered During Dialysis: No  Labs Sent During Dialysis: Yes  Heparin Drip Rate Changes: N/A  Dialysis Catheter Dressing: clean and intact  Last Dressing Change: 12/04/24    Electronic Signatures:   (Signed )   Authored:    (Signed )   Authored:     Last Updated: 5:39 PM by PERFECTO AVILA

## 2024-12-04 NOTE — CARE PLAN
The patient's goals for the shift include      The clinical goals for the shift include Patient will have decreased pain throughout shift

## 2024-12-04 NOTE — PROGRESS NOTES
[facility] Hospitalist Progress Note       [phone]: Please page me for patient care issues.  [phone]: Please page night hospitalist for any issues.     Batsheva Page  :  1975(49 y.o.)  MRN:  [id number]  PCP: Zhou Pedersen MD      Principal Problem:    Sepsis (Multi)      Assessment and Plan:     Batsheva Page is a 49 y.o. 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 Patient presented to the ED due to AMS fever (102oF) and rigor. Patient's  last HD session was 24 and the dialysis center enoch blood cultures was positive for gram +ve cocci in clusters  In the ED were remarkable mainly for a wbc of 17.7. CXR was read as showing a developing right basal infiltrate.  She was given a dose each of vancomycin and zosyn and was transferred to [facility] for further care and management.     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  #ESRD on HD (MWF)   #RLL infiltrate on CXR, asymptomatic  #Acute metabolic encephalopathy, resolved  #Overweight BMI 25-29.9, Body mass index is 26.34 kg/m².  -Vanc/zosyn->vanc pending repeat Bcx  -TTE (24) w/o e/o of endocarditis. Will consider cards CS w/ LAURA if repeat Bcx is positive  -resume rest of home meds as indicated  -CS notes reviewed and recs appreciated: ID, nephrology     Disposition: await test results, await consultant recommendations, and await clinical improvement    DVT Prophylaxis: Subq Heparin    Code status: Full Code  Diet: Adult diet Regular, Renal; Potassium Restricted 2 gm (50mEq); 2 - 3 grams Sodium    Level of MDM:  High    patient and family updated, I personally examined the patient, and I personally reviewed chart, data, labs radiology reports    Family Communication  Number :   Name of  Designated Family Representative:       Total time spent: 35 minutes, of total time providing counseling or in coordination of care. Total time on this day of visit includes record and documentation review before and after visit including documentation and time not explicitly included on EMR time stamp      Subjective:   Interval History:  HPI  The patient complains of bacteremia  The patient feels their symptoms areunchanged  no events or new concerns    Scheduled Meds:aspirin, 81 mg, oral, Daily  atorvastatin, 40 mg, oral, Daily  calcitriol, 0.5 mcg, oral, Daily  docusate sodium, 100 mg, oral, BID  [START ON 2024] ergocalciferol, 1,250 mcg, oral, Every   heparin, 1,900 Units, intra-catheter, After Dialysis  heparin, 1,900 Units, intra-catheter, After Dialysis  lactulose, 20 g, oral, TID  levETIRAcetam, 750 mg, oral, Nightly  pregabalin, 50 mg, oral, BID  vancomycin, 750 mg, intravenous, Every Mon/Wed/Fri      Continuous Infusions:   PRN Meds:PRN medications: acetaminophen, dextromethorphan-guaifenesin, diphenhydrAMINE, guaiFENesin, hydrOXYzine HCL, morphine, oxyCODONE-acetaminophen, promethazine **OR** promethazine, vancomycin    Review of Systems   All other systems reviewed and are negative.    Interval Pertinent History:  Social History     Tobacco Use    Smoking status: Never    Smokeless tobacco: Never   Substance Use Topics    Alcohol use: Never         Objective:   Patient Vitals for the past 24 hrs:   BP Temp Temp src Pulse Resp SpO2 Weight   24 1731 -- 37.3 °C (99.2 °F) Temporal 95 -- -- --   24 1507 -- 37.4 °C (99.3 °F) Temporal 78 -- 98 % --   24 1320 -- 36.8 °C (98.3 °F) Temporal 79 -- -- --   24 0533 145/74 35.7 °C (96.3 °F) Temporal 63 16 -- 74 kg (163 lb 3.2 oz)   24 1900 129/84 36.5 °C (97.7 °F) Temporal 71 18 98 % --       Average, Min, and Max for last 24 hours Vitals:  TEMPERATURE:  Temp  Av.8 °C (98.2 °F)  Min: 35.7 °C (96.3 °F)  Max: 37.4 °C (99.3  °F)    RESPIRATIONS RANGE: Resp  Av  Min: 16  Max: 18    PULSE RANGE: Pulse  Av.2  Min: 63  Max: 95    BLOOD PRESSURE RANGE:  Systolic (24hrs), Av , Min:129 , Max:145   ; Diastolic (24hrs), Av, Min:74, Max:84      PULSE OXIMETRY RANGE: SpO2  Av %  Min: 98 %  Max: 98 %  Body mass index is 26.34 kg/m².    I/O last 3 completed shifts:  In: 180 (2.4 mL/kg) [P.O.:180]  Out: - (0 mL/kg)   Weight: 74 kg   Weight change: 2.527 kg (5 lb 9.1 oz)     Physical Exam  Vitals and nursing note reviewed.   Constitutional:       Appearance: Normal appearance.   HENT:      Head: Normocephalic and atraumatic.      Right Ear: External ear normal.      Left Ear: External ear normal.      Nose: Nose normal.      Mouth/Throat:      Mouth: Mucous membranes are moist.   Eyes:      General: No scleral icterus.        Right eye: No discharge.         Left eye: No discharge.      Extraocular Movements: Extraocular movements intact.      Conjunctiva/sclera: Conjunctivae normal.      Pupils: Pupils are equal, round, and reactive to light.   Cardiovascular:      Rate and Rhythm: Normal rate and regular rhythm.      Heart sounds: Murmur heard.   Pulmonary:      Effort: Pulmonary effort is normal.      Breath sounds: Normal breath sounds.   Chest:      Comments: Right anterior chest wall dialysis cath in place  Abdominal:      General: Abdomen is flat. Bowel sounds are normal.      Palpations: Abdomen is soft.   Musculoskeletal:         General: Normal range of motion.      Right lower leg: No edema.      Left lower leg: No edema.   Skin:     General: Skin is warm and dry.      Capillary Refill: Capillary refill takes less than 2 seconds.   Neurological:      General: No focal deficit present.   Psychiatric:         Mood and Affect: Mood normal.         Thought Content: Thought content normal.         Judgment: Judgment normal.         Lab Results   Component Value Date     (L) 2024    K 4.7 2024    CL 94  (L) 12/04/2024    CO2 18 (L) 12/04/2024    BUN 65 (H) 12/04/2024    CREATININE 9.33 (H) 12/04/2024    GLUCOSE 91 12/04/2024    CALCIUM 7.5 (L) 12/04/2024    PROT 8.5 (H) 12/02/2024    BILITOT 0.7 12/02/2024    ALKPHOS 106 12/02/2024    AST 18 12/02/2024    ALT 8 12/02/2024    GLOB 4.1 (H) 09/21/2023       Lab Results   Component Value Date    WBC 7.5 12/04/2024    HGB 10.6 (L) 12/04/2024    HCT 34.0 (L) 12/04/2024    MCV 90 12/04/2024     (L) 12/04/2024    LYMPHOPCT 10.1 12/04/2024    RBC 3.76 (L) 12/04/2024    MCH 28.2 12/04/2024    MCHC 31.2 (L) 12/04/2024    RDW 17.5 (H) 12/04/2024    CRP 27.39 (H) 12/03/2024       Lab Results   Component Value Date    TSH 2.12 04/28/2024     Lab Results   Component Value Date    LACTATE 1.4 12/02/2024    DDIMER 3.81 (H) 10/26/2022    INR 1.4 (H) 04/28/2024       IMAGES:  Encounter Date: 12/02/24   ECG 12 Lead   Result Value    Ventricular Rate 71    Atrial Rate 71    TN Interval 176    QRS Duration 72    QT Interval 420    QTC Calculation(Bazett) 456    P Axis 65    R Axis 50    T Axis 55    QRS Count 12    Q Onset 221    P Onset 133    P Offset 190    T Offset 431    QTC Fredericia 444    Narrative    Normal sinus rhythm  Normal ECG  When compared with ECG of 20-SEP-2024 14:11,  Minimal criteria for Inferior infarct are no longer Present  T wave amplitude has decreased in Anterolateral leads        Transthoracic Echo (TTE) Complete    Result Date: 12/4/2024   [facility], [address] [address]               Tel [phone] and Fax [phone] TRANSTHORACIC ECHOCARDIOGRAM REPORT  Patient Name:       RITA SAMY Aguiar Physician:    [id number] Lyndsey Moncada MD Study Date:         12/4/2024           Ordering Provider:    [id number] VICENTE FRANCO MRN/PID:            [id number]            Fellow: Accession#:          [id number]        Nurse: Date of Birth/Age:  1975 / 49     Sonographer:          Yun hirsch                                     RDCS Gender assigned at  F                   Additional Staff: Birth: Height:             167.64 cm           Admit Date:           12/3/2024 Weight:             71.21 kg            Admission Status:     Inpatient -                                                               Routine BSA / BMI:          1.80 m2 / 25.34     Encounter#:           [id number]                     kg/m2 Blood Pressure:     145/74 mmHg         Department Location:  [facility] Non                                                               Invasive Study Type:    TRANSTHORACIC ECHO (TTE) COMPLETE Diagnosis/ICD: 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 Pertinent History: Fever, CAD and ESRD,AV= Inspiris 21mm, MV= EPIC St. Devonte                    27mm. Study Detail: The following Echo studies were performed: 2D, M-Mode, Doppler and               color flow. Patient has a pacemaker.               The patient was awake.  PHYSICIAN INTERPRETATION: Left Ventricle: The left ventricular systolic function is normal, with a visually estimated ejection fraction of 65-70%. The left ventricular cavity size is normal. There is mildly increased septal and mildly increased posterior left ventricular wall thickness. There is left ventricular concentric remodeling. Abnormal (paradoxical) septal motion consistent with post-operative status. Left ventricular diastolic  filling cannot be determined, due to mitral valve repair/replacement. Left Atrium: The left atrium was not well visualized. Right Ventricle: The right ventricle is mildly enlarged. There is normal right ventricular global systolic function. RV free wall is not well visualized. Right Atrium: The right atrium is normal in size. Aortic Valve: There is a prosthetic aortic valve present. The aortic valve dimensionless index is 0.46. There is Inspiris bioprosthetic type aortic valve bioprosthesis with a 21 mm reported size. Echo findings are consistent with normal aortic valve prosthesis structure and function. There is no evidence of aortic valve regurgitation. The peak instantaneous gradient of the aortic valve is 33 mmHg. The mean gradient of the aortic valve is 18 mmHg. The valve is not well visualized in the short axis views. No obvious vegetation. Mitral Valve: There is a prosthetic mitral valve present. There is a [place]. Devonte bioprosthetic type mitral valve prosthesis with a 27 mm reported size. The peak and mean gradients are 13 mmHg and 6 mmHg respectively. The peak instantaneous gradient of the mitral valve is 13 mmHg. Echo findings are consistent with normal mitral valve prosthesis structure and function. There is trace mitral valve regurgitation. No obvious vegetation. Tricuspid Valve: Status post tricuspid valve repair. There is trace to mild tricuspid regurgitation. The Doppler estimated RVSP is mildly elevated at 35.5 mmHg. Mean gradient is 2mmHg. No tricuspid stenosis. No obvious vegetation. Pulmonic Valve: The pulmonic valve is not well visualized. There is physiologic pulmonic valve regurgitation. Pericardium: There is no pericardial effusion noted. Aorta: The aortic root is normal. Systemic Veins: The inferior vena cava appears small in size, with IVC inspiratory collapse greater than 50%.  CONCLUSIONS:  1. Poorly visualized anatomical structures due to suboptimal image quality.  2. The left ventricular  systolic function is normal, with a visually estimated ejection fraction of 65-70%.  3. Abnormal septal motion consistent with post-operative status.  4. There is normal right ventricular global systolic function.  5. Mildly enlarged right ventricle.  6. There is a St. Devonte bioprosthetic type mitral valve prosthesis with a 27 mm reported size. The peak and mean gradients are 13 mmHg and 6 mmHg respectively.  7. Status post tricuspid valve repair.  8. Mildly elevated right ventricular systolic pressure.  9. There is Inspiris bioprosthetic type aortic valve bioprosthesis with a 21 mm reported size. 10. Echo findings are consistent with normal aortic valve prosthesis structure and function. 11. Echo findings are consistent with normal mitral valve prosthesis structure and function. QUANTITATIVE DATA SUMMARY:  2D MEASUREMENTS:          Normal Ranges: IVSd:            0.98 cm  (0.6-1.1cm) LVPWd:           1.02 cm  (0.6-1.1cm) LVIDd:           3.23 cm  (3.9-5.9cm) LVIDs:           2.33 cm LV Mass Index:   51 g/m2 LVEDV Index:     41 ml/m2 LV % FS          27.9 %  LA VOLUME:                    Normal Ranges: LA Vol A4C:        32.3 ml    (22+/-6mL/m2) LA Vol A2C:        25.1 ml LA Vol BP:         31.8 ml LA Vol Index A4C:  17.9 ml/m2 LA Vol Index A2C:  13.9 ml/m2 LA Vol Index BP:   17.6 ml/m2 LA Area A4C:       13.5 cm2 LA Area A2C:       10.7 cm2 LA Major Axis A4C: 4.8 cm LA Major Axis A2C: 3.8 cm LA Vol A4C:        27.3 ml LA Vol A2C:        22.0 ml LA Vol Index BSA:  13.7 ml/m2  RA VOLUME BY A/L METHOD:          Normal Ranges: RA Area A4C:             16.4 cm2  AORTA MEASUREMENTS:         Normal Ranges: Ao Sinus, d:        2.00 cm (2.1-3.5cm) Asc Ao, d:          2.60 cm (2.1-3.4cm)  LV SYSTOLIC FUNCTION BY 2D PLANIMETRY (MOD):                      Normal Ranges: EF-A4C View:    62 % (>=55%) EF-A2C View:    72 % EF-Biplane:     67 % EF-Visual:      68 % LV EF Reported: 68 %  LV DIASTOLIC FUNCTION:             Normal  Ranges: MV Peak E:             1.92 m/s    (0.7-1.2 m/s) MV Peak A:             1.01 m/s    (0.42-0.7 m/s) E/A Ratio:             1.90        (1.0-2.2) MV e'                  0.060 m/s   (>8.0) MV lateral e'          0.06 m/s MV medial e'           0.06 m/s MV A Dur:              117.03 msec E/e' Ratio:            32.04       (<8.0)  MITRAL VALVE:           Normal Ranges: MV Vmax:      1.79 m/s  (<=1.3m/s) MV peak P.9 mmHg (<5mmHg) MV mean P.8 mmHg  (<2mmHg) MV VTI:       49.37 cm  (10-13cm) MV DT:        238 msec  (150-240msec)  AORTIC VALVE:                      Normal Ranges: AoV Vmax:                2.87 m/s  (<=1.7m/s) AoV Peak P.8 mmHg (<20mmHg) AoV Mean P.2 mmHg (1.7-11.5mmHg) LVOT Max Jamarcus:            1.18 m/s  (<=1.1m/s) AoV VTI:                 53.97 cm  (18-25cm) LVOT VTI:                24.65 cm LVOT Diameter:           1.76 cm   (1.8-2.4cm) AoV Area, VTI:           1.11 cm2  (2.5-5.5cm2) AoV Area,Vmax:           1.00 cm2  (2.5-4.5cm2) AoV Dimensionless Index: 0.46  RIGHT VENTRICLE: RV Basal 4.20 cm RV Mid   3.40 cm RV Major 8.7 cm TAPSE:   13.0 mm RV s'    0.10 m/s  TRICUSPID VALVE/RVSP:          Normal Ranges: Peak TR Velocity:     2.85 m/s RV Syst Pressure:     36 mmHg  (< 30mmHg) IVC Diam:             1.00 cm  PULMONIC VALVE:          Normal Ranges: PV Max Jamarcus:     1.0 m/s  (0.6-0.9m/s) PV Max PG:      3.8 mmHg  AORTA: Asc Ao Diam 2.55 cm  [id number] Lyndsey Moncada MD Electronically signed on 2024 at 12:54:13 PM  ** Final **     Transesophageal Echo (LAURA)    Result Date: 2024           [place] [address]            Phone [phone] TRANSESOPHAGEAL ECHOCARDIOGRAM REPORT  Patient Name:     RITA SAMY TEMPLE Reading Physician:   [id number] Skaggs DO Study Date:       2024             Ordering Provider:   [id number] SKAGGS MRN/PID:           [id number]             Fellow: Accession#:       [id number]         Nurse: Date of           1975 / 48      Sonographer:         Dionte Vasquez RDCS Birth/Age:        years Gender:           F                    Additional Staff: Height:           172.00 cm            Admit Date: Weight:           70.31 kg             Admission Status:    Inpatient - Routine BSA / BMI:        1.83 m2 / 23.77      Department Location: [facility]                   kg/m2 Blood Pressure: 153 /72 mmHg Study Type:    TRANSESOPHAGEAL ECHO (LAURA) Diagnosis/ICD: Viral endocarditis-B33.21 Indication:    Endocarditis CPT Codes:     LAURA Complete-[id number]  Study Detail: The following Echo studies were performed: 2D, M-Mode and color               flow.  PHYSICIAN INTERPRETATION: LAURA Details: The LAURA probe used was B21HOD. Technically adequate omniplane transesophageal echocardiogram performed. LAURA Medication: The patient was sedated by Anesthesia; please refer to anesthesia flow sheet for medications used. LAURA Procedure: The probe was passed without difficulty. The following complication was encountered during the procedure: Patient tolerated the procedure well without any apparent complications. Left Ventricle: Left ventricular systolic function is normal. There are no regional wall motion abnormalities. The left ventricular cavity size is normal. Left ventricular diastolic filling was indeterminate. Left Atrium: The left atrium is normal in size. There is a normal sized left atrial appendage, there is no thrombus visualized in the left atrial appendage and the left atrial appendage Doppler velocities are normal. Right Ventricle: The right ventricle is normal in size. There is normal right ventricular global systolic function. Right Atrium: The right atrium was not well visualized. Aortic Valve: There is a prosthetic aortic valve present. Echo findings are consistent with normal aortic valve prosthesis structure and function. There is no  evidence of aortic valve regurgitation. Mitral Valve: There is a prosthetic mitral valve present. Echo findings are consistent with normal mitral valve prosthesis structure and function. There is trace mitral valve regurgitation. Trace prosthetic mitral regurgitation. Mobile echodensity previously seen is not visualized. Tricuspid Valve: The tricuspid valve was not well visualized. Tricuspid regurgitation was not assessed. Pulmonic Valve: The pulmonic valve is not well visualized. The pulmonic valve regurgitation was not well visualized. Pericardium: There is no pericardial effusion noted. Aorta: The aortic root is normal.  CONCLUSIONS:  1. Left ventricular systolic function is normal. QUANTITATIVE DATA SUMMARY:  [id number] Fabian Skaggs DO Electronically signed on 5/1/2024 at 3:47:59 PM  ** Final **     Transthoracic Echo (TTE) Complete    Result Date: 4/29/2024           [place] [address]            Phone [phone] TRANSTHORACIC ECHOCARDIOGRAM REPORT  Patient Name:      RITA PABLO MAVERICK  Reading Physician:    [id number] Fabian Skaggs DO Study Date:        4/29/2024             Ordering Provider:    [id number] LIZAMA MRN/PID:           [id number]              Fellow: Accession#:        [id number]          Nurse: Date of Birth/Age: 1975 / 48 years Sonographer:          Dionte Vasquez RDCS Gender:            F                     Additional Staff: Height:            170.00 cm             Admit Date: Weight:            69.00 kg              Admission Status:     Inpatient -                                                                Routine BSA / BMI:         1.80 m2 / 23.88 kg/m2 Department Location:  [facility] Blood Pressure: 144 /36 mmHg Study Type:     TRANSTHORACIC ECHO (TTE) COMPLETE Diagnosis/ICD: Endocarditis, valve unspecified-I38 Indication:    Endocarditis CPT Codes:     Echo Complete w Full Doppler-[id number] Patient History: Pertinent History: CAD, Chest Pain, CHF, HTN and Hyperlipidemia. Pacemaker,                    ESRD, MRSA, TVr-2013/Ring, MVR-2022/#27 St Devonte, AVR-2022/#21                    Inspirus. Study Detail: The following Echo studies were performed: 2D, M-Mode, Doppler and               color flow. Technically challenging study due to poor acoustic               windows and patient lying in supine position.  PHYSICIAN INTERPRETATION: Left Ventricle: Left ventricular systolic function is normal, with an estimated ejection fraction of 65-70%. There are no regional wall motion abnormalities. The left ventricular cavity size is normal. Spectral Doppler shows an impaired relaxation pattern of left ventricular diastolic filling. Left Atrium: The left atrium is normal in size. Right Ventricle: The right ventricle is normal in size. There is normal right ventricular global systolic function. Right Atrium: The right atrium is normal in size. Aortic Valve: There is a prosthetic aortic valve present. There is bioprosthetic aortic valve. Echo findings are consistent with normal aortic valve prosthesis structure and function. There is no evidence of aortic valve regurgitation. The peak instantaneous gradient of the aortic valve is 50.7 mmHg. The mean gradient of the aortic valve is 27.0 mmHg. Mitral Valve: There is a prosthetic mitral valve present. There is a normally functioning mitral valve prosthesis. There is no evidence of mitral valve regurgitation. Tricuspid Valve: The tricuspid valve is structurally normal. There is trace tricuspid regurgitation. Pulmonic Valve: The pulmonic valve is not well visualized. The pulmonic valve regurgitation was not well visualized. Pericardium: There is no pericardial effusion noted. Aorta: The aortic root is normal.   CONCLUSIONS:  1. Left ventricular systolic function is normal with a 65-70% estimated ejection fraction.  2. Spectral Doppler shows an impaired relaxation pattern of left ventricular diastolic filling.  3. There is a normally functioning mitral valve prosthesis.  4. There is a bioprosthetic aortic valve. QUANTITATIVE DATA SUMMARY: 2D MEASUREMENTS:                          Normal Ranges: LAs:           3.30 cm   (2.7-4.0cm) IVSd:          0.99 cm   (0.6-1.1cm) LVPWd:         0.96 cm   (0.6-1.1cm) LVIDd:         3.93 cm   (3.9-5.9cm) LVIDs:         2.52 cm LV Mass Index: 66.4 g/m2 LV % FS        35.9 % LA VOLUME:                               Normal Ranges: LA Vol A4C:        53.4 ml    (22+/-6mL/m2) LA Vol A2C:        38.7 ml LA Vol BP:         47.0 ml LA Vol Index A4C:  29.7ml/m2 LA Vol Index A2C:  21.5 ml/m2 LA Vol Index BP:   26.2 ml/m2 LA Area A4C:       17.0 cm2 LA Area A2C:       14.0 cm2 LA Major Axis A4C: 4.6 cm LA Major Axis A2C: 4.3 cm LA Volume Index:   25.0 ml/m2 LA Vol A4C:        49.0 ml LA Vol A2C:        37.0 ml RA VOLUME BY A/L METHOD:                       Normal Ranges: RA Area A4C: 16.0 cm2 LV SYSTOLIC FUNCTION BY 2D PLANIMETRY (MOD):                     Normal Ranges: EF-A4C View: 59.6 % (>=55%) EF-A2C View: 65.1 % EF-Biplane:  61.8 % LV DIASTOLIC FUNCTION:                     Normal Ranges: MV Peak E: 1.56 m/s (0.7-1.2 m/s) MV Peak A: 1.58 m/s (0.42-0.7 m/s) E/A Ratio: 0.99     (1.0-2.2) MITRAL VALVE:                       Normal Ranges: MV Vmax:    1.66 m/s  (<=1.3m/s) MV peak P.0 mmHg (<5mmHg) MV mean P.0 mmHg  (<48mmHg) MV DT:      254 msec  (150-240msec) AORTIC VALVE:                                    Normal Ranges: AoV Vmax:                3.56 m/s  (<=1.7m/s) AoV Peak P.7 mmHg (<20mmHg) AoV Mean P.0 mmHg (1.7-11.5mmHg) LVOT Max Jamarcus:            1.41 m/s  (<=1.1m/s) AoV VTI:                 64.40 cm  (18-25cm) LVOT VTI:                30.60 cm AoV  Dimensionless Index: 0.48  RIGHT VENTRICLE: RV Basal 3.39 cm RV Mid   2.40 cm RV Major 6.2 cm TAPSE:   17.5 mm RV s'    0.12 m/s TRICUSPID VALVE/RVSP:                             Normal Ranges: Peak TR Velocity: 2.58 m/s RV Syst Pressure: 29.6 mmHg (< 30mmHg) IVC Diam:         1.41 cm PULMONIC VALVE:                         Normal Ranges: PV Accel Time: 92 msec  (>120ms) PV Max Jamarcus:    1.2 m/s  (0.6-0.9m/s) PV Max P.9 mmHg  [id number] Flores DO Electronically signed on 2024 at 2:07:09 PM  ** Final **     Transesophageal Echo (LAURA)    Result Date: 2024           [place] [address]            Phone [phone] TRANSESOPHAGEAL ECHOCARDIOGRAM REPORT  Patient Name:     RITA SAMY TEMPLE Reading Physician:   [id number] Flores DO Study Date:       2024            Ordering Provider:   [id number] FLORES MRN/PID:          [id number]             Fellow: Accession#:       [id number]         Nurse: Date of           1975      Sonographer:         Dionte Vasquez RDCS Birth/Age:        years Gender:           F                    Additional Staff: Height:           165.00 cm            Admit Date: Weight:           65.00 kg             Admission Status:    Inpatient - Routine BSA:              1.72 m2              Department Location: [facility] [facility] Blood Pressure: 142 /50 mmHg Study Type:    TRANSESOPHAGEAL ECHO (LAURA) Diagnosis/ICD: Viral endocarditis-B33.21 Indication:    Endocarditis CPT Codes:     LAURA Complete-[id number] Patient History: Pertinent History: HTN, Hyperlipidemia, CAD and CHF. ESRD, Pacemaker, s/p                    Redo-sternotomy with TVr-ring repair, AVR #21 Inspirus, MVR                    #27 [facility] [place] Epic Bioprosthesis. Study Detail: The following Echo studies were performed: 2D, color flow and               Doppler.  PHYSICIAN INTERPRETATION: LAURA Details: The  LAURA probe used was [id number]. Technically adequate omniplane transesophageal echocardiogram performed. Color flow Doppler echo was performed to assess for the presence of a patent foramen ovale. LAURA Medication: The patient was sedated by Anesthesia; please refer to anesthesia flow sheet for medications used. LAURA Procedure: The probe was passed without difficulty. Left Ventricle: Left ventricular systolic function is normal. There are no regional wall motion abnormalities. The left ventricular cavity size is normal. Left ventricular diastolic filling was not assessed. Left Atrium: The left atrium is normal in size. There is a normal sized left atrial appendage, the left atrial appendage Doppler velocities are normal and there is no thrombus visualized in the left atrial appendage. Right Ventricle: The right ventricle is normal in size. There is normal right ventricular global systolic function. Right Atrium: The right atrium is normal in size. Aortic Valve: There is no visualized aortic valve vegetation. There is a prosthetic aortic valve present. There is bioprosthetic aortic valve. There is no evidence of aortic valve regurgitation. Mitral Valve: There is a prosthetic mitral valve present. There is a mitral valve bioprosthesis. There is trace mitral valve regurgitation. There is a mobile echodensity at the base of the anterior leaflet suspicious for vegetation, decreased in size in comparison to 9/2023 study. There is a small perforation of this leaflet associated but overall the valve integrity is fairly well preserved. Tricuspid Valve: The tricuspid valve is structurally normal. There is trace tricuspid regurgitation. Pulmonic Valve: The pulmonic valve was not assessed. The pulmonic valve regurgitation was not assessed. Pericardium: There is no pericardial effusion noted. Aorta: The aortic root is normal.  CONCLUSIONS:  1. Left ventricular systolic function is normal.  2. There is a mobile echodensity at the base of  the anterior leaflet suspicious for vegetation, decreased in size in comparison to 9/2023 study. There is a small perforation of this leaflet associated but overall the valve integrity is fairly well preserved.  3. No aortic valve vegetation visualized.  4. There is a bioprosthetic aortic valve. QUANTITATIVE DATA SUMMARY:  [id number] Fabian Skaggs DO Electronically signed on 1/12/2024 at 1:32:47 PM  ** Final **     Transthoracic Echo (TTE) Complete    Result Date: 1/11/2024           [place] [id number]            Phone [phone] TRANSTHORACIC ECHOCARDIOGRAM REPORT  Patient Name:     RITA PABLO MAVERICK Reading Physician:   [id number] Fabian Skaggs DO Study Date:       1/10/2024            Ordering Provider:   [id number] MASSIEL NARANJO MRN/PID:          [id number]             Fellow: Accession#:       [id number]         Nurse: Date of           1975 / 48      Sonographer:         Lety Ivy RDCS Birth/Age:        years Gender:           F                    Additional Staff: Height:           165.10 cm            Admit Date: Weight:           63.96 kg             Admission Status:    Inpatient - Routine BSA:              1.71 m2              Department Location: [facility] Blood Pressure: 110 /36 mmHg Study Type:    TRANSTHORACIC ECHO (TTE) COMPLETE Diagnosis/ICD: Presence of prosthetic heart valve-Z95.2 Indication:    bacteremia CPT Codes:     Echo Complete w Full Doppler-[id number] Patient History: Pacer/Defib:       Permanent pacemaker Pertinent History: HTN. s/p MVR, s/p AVR,bacteremia, PAF,ESRD, Anemia,                    CABG,endocarditis. Study Detail: The following Echo studies were performed: 2D, M-Mode, color flow               and Doppler. Technically challenging study due to dialysis cath lt               parasternal.  PHYSICIAN INTERPRETATION: Left Ventricle: Left ventricular systolic function is normal,  with an estimated ejection fraction of 60-65%. There are no regional wall motion abnormalities. The left ventricular cavity size is normal. Spectral Doppler shows a normal pattern of left ventricular diastolic filling. Left Atrium: The left atrium is normal in size. Right Ventricle: The right ventricle is normal in size. There is normal right ventricular global systolic function. Right Atrium: The right atrium is normal in size. Aortic Valve: There is no visualized aortic valve vegetation. There is a prosthetic aortic valve present. There is bioprosthetic aortic valve. There is no evidence of aortic valve regurgitation. The peak instantaneous gradient of the aortic valve is 33.2 mmHg. The mean gradient of the aortic valve is 19.0 mmHg. Mitral Valve: There is a prosthetic mitral valve present. There is a normally functioning mitral valve prosthesis. There was no mitral valve vegetation visualized. There is trace mitral valve regurgitation. Tricuspid Valve: The tricuspid valve is structurally normal. There is trace tricuspid regurgitation. Pulmonic Valve: The pulmonic valve is not well visualized. The pulmonic valve regurgitation was not well visualized. Pericardium: There is no pericardial effusion noted. Aorta: The aortic root is normal.  CONCLUSIONS:  1. Left ventricular systolic function is normal with a 60-65% estimated ejection fraction.  2. No mitral valve vegetation visualized.  3. There is a normally functioning mitral valve prosthesis.  4. No aortic valve vegetation visualized.  5. There is a bioprosthetic aortic valve. QUANTITATIVE DATA SUMMARY: 2D MEASUREMENTS:                          Normal Ranges: IVSd:          0.85 cm   (0.6-1.1cm) LVPWd:         0.72 cm   (0.6-1.1cm) LVIDd:         3.87 cm   (3.9-5.9cm) LV Mass Index: 50.5 g/m2 LV SYSTOLIC FUNCTION BY 2D PLANIMETRY (MOD):                     Normal Ranges: EF-A4C View: 60.9 % (>=55%) EF-A2C View: 65.0 % EF-Biplane:  62.9 % LV DIASTOLIC FUNCTION:                      Normal Ranges: MV Peak E: 1.87 m/s (0.7-1.2 m/s) MV Peak A: 1.01 m/s (0.42-0.7 m/s) E/A Ratio: 1.85     (1.0-2.2) MITRAL VALVE:                       Normal Ranges: MV Vmax:    1.78 m/s  (<=1.3m/s) MV peak P.7 mmHg (<5mmHg) MV mean P.0 mmHg  (<48mmHg) MV DT:      320 msec  (150-240msec) AORTIC VALVE:                                    Normal Ranges: AoV Vmax:                2.88 m/s  (<=1.7m/s) AoV Peak P.2 mmHg (<20mmHg) AoV Mean P.0 mmHg (1.7-11.5mmHg) LVOT Max Jamarcus:            1.12 m/s  (<=1.1m/s) AoV VTI:                 62.50 cm  (18-25cm) LVOT VTI:                21.00 cm LVOT Diameter:           2.00 cm   (1.8-2.4cm) AoV Area, VTI:           1.06 cm2  (2.5-5.5cm2) AoV Area,Vmax:           1.22 cm2  (2.5-4.5cm2) AoV Dimensionless Index: 0.34 TRICUSPID VALVE/RVSP:                             Normal Ranges: Peak TR Velocity: 2.84 m/s RV Syst Pressure: 35.3 mmHg (< 30mmHg) IVC Diam:         1.28 cm  [id number] Fabian Skaggs DO Electronically signed on 2024 at 11:38:48 AM  ** Final **    === 24 ===    XR CHEST 1 VIEW    - Impression -  1.  Developing right basal infiltrate. See discussion above. Possible  developing retrocardiac left basal infiltrate.        MACRO:  None    Signed by: Joseph Schoenberger 2024 3:59 PM  Dictation workstation:   [id number]  === 24 ===    CT ABDOMEN PELVIS WO IV CONTRAST    - Impression -  1.  Large amount of formed stool throughout the colon without wall  thickening or inflammatory change suggestive of constipation.  Clinical correlation recommended.  2. Nonspecific gallbladder distention without radiopaque calculi.  Unchanged mild splenomegaly. Correlation with liver enzymes is  recommended.  3. Unchanged 4.4 x 3.9 cm right ovarian cyst. Follow-up outpatient  ultrasound recommended.      Signed by: Harinder eD La Rosa 2024 8:03 PM  Dictation workstation:   [id number]  === 24 ===    XR CHEST 1 VIEW    -  Impression -  1.  Developing right basal infiltrate. See discussion above. Possible  developing retrocardiac left basal infiltrate.        MACRO:  None    Signed by: Joseph Schoenberger 12/2/2024 3:59 PM  Dictation workstation:   [id number]      Additional results of the last 24 hours have been reviewed.    Dictated using [employer] Version 2.4  Proof read however unrecognized voice recognition errors may have occurred     Electronically signed by Blayne East DO on 12/04/24 at 6:06 PM

## 2024-12-04 NOTE — PROGRESS NOTES
NEPHROLOGY NEW CONSULT NOTE   Patient ID: Batsheva Page is a 49 y.o. female.     Reason for consult: ESRD-HD    No chief complaint on file.       HPI  Batsheva Page is a 49 y.o. female   - With past medical Hx as below   - Admitted for sepsis/possible CLABSI  - Nephrology was consulted for ESRD management     Past Medical History:   Diagnosis Date    Aortic valve replaced 2022    CHF (congestive heart failure)     Chronic pain     Coronary artery disease     Disease of thyroid gland     ESRD (end stage renal disease) (Multi)     H/O mitral valve replacement 2013    and 2022    Heart disease     History of transfusion     Hypertension     Mitral valve regurgitation     Seizures (Multi)     Stroke (Multi)       Past Surgical History:   Procedure Laterality Date    AORTIC VALVE REPLACEMENT  09/26/2022    bioprosthetic    CORONARY ARTERY BYPASS GRAFT      IR CVC  01/12/2024    exchange    IR CVC  05/07/2024    exchange    IR CVC  08/20/2024    removal    IR CVC TUNNELED  09/09/2022    IR CVC TUNNELED 9/9/2022 [facility]    IR CVC TUNNELED  12/28/2022    IR CVC TUNNELED 12/28/2022 [facility] INPATIENT [facility]GAC[facility]    MITRAL VALVE REPAIR  2013    MITRAL VALVE REPLACEMENT  09/26/2022    bioprosthetic    PARATHYROIDECTOMY      TRICUSPID VALVULOPLASTY  2013    US GUIDED PERCUTANEOUS PLACEMENT  07/14/2022    US GUIDED PERCUTANEOUS PLACEMENT [facility]      Family History   Problem Relation Name Age of Onset    No Known Problems Mother      No Known Problems Father       Social History     Socioeconomic History    Marital status:      Spouse name: Not on file    Number of children: Not on file    Years of education: Not on file    Highest education level: Not on file   Occupational History    Not on file   Tobacco Use    Smoking status: Never    Smokeless tobacco: Never   Vaping Use    Vaping status: Never Used   Substance and Sexual Activity    Alcohol use: Never    Drug use: Never    Sexual  activity: Not on file     Comment: post menopausal from dialysys   Other Topics Concern    Not on file   Social History Narrative    Not on file     Social Drivers of Health     Financial Resource Strain: Low Risk  (12/3/2024)    Overall Financial Resource Strain (CARDIA)     Difficulty of Paying Living Expenses: Not very hard   Food Insecurity: No Food Insecurity (12/3/2024)    Hunger Vital Sign     Worried About Running Out of Food in the Last Year: Never true     Ran Out of Food in the Last Year: Never true   Transportation Needs: No Transportation Needs (12/3/2024)    PRAPARE - Transportation     Lack of Transportation (Medical): No     Lack of Transportation (Non-Medical): No   Physical Activity: Inactive (12/3/2024)    Exercise Vital Sign     Days of Exercise per Week: 0 days     Minutes of Exercise per Session: 0 min   Stress: No Stress Concern Present (12/3/2024)    [place] [employer] - Occupational Stress Questionnaire     Feeling of Stress : Not at all   Social Connections: Moderately Isolated (4/29/2024)    Social Connection and Isolation Panel [NHANES]     Frequency of Communication with Friends and Family: More than three times a week     Frequency of Social Gatherings with Friends and Family: More than three times a week     Attends Judaism Services: More than 4 times per year     Active Member of Clubs or Organizations: No     Attends Club or Organization Meetings: Never     Marital Status:    Intimate Partner Violence: Not At Risk (12/3/2024)    Humiliation, Afraid, Rape, and Kick questionnaire     Fear of Current or Ex-Partner: No     Emotionally Abused: No     Physically Abused: No     Sexually Abused: No   Housing Stability: Low Risk  (12/3/2024)    Housing Stability Vital Sign     Unable to Pay for Housing in the Last Year: No     Number of Times Moved in the Last Year: 0     Homeless in the Last Year: No     Allergies   Allergen Reactions    Kayexalate Seizure     "Metoclopramide Hcl Other     anxious, agitated, \"skin crawl    Prochlorperazine Other     anxious, agitated, \"skin crawl    Zofran [Ondansetron Hcl] Headache    Ondansetron Other and Headache        Scheduled medications  aspirin, 81 mg, oral, Daily  atorvastatin, 40 mg, oral, Daily  calcitriol, 0.5 mcg, oral, Daily  docusate sodium, 100 mg, oral, BID  [START ON 12/8/2024] ergocalciferol, 1,250 mcg, oral, Every Sunday  lactulose, 20 g, oral, TID  levETIRAcetam, 750 mg, oral, Nightly  pregabalin, 50 mg, oral, BID  vancomycin, 750 mg, intravenous, Every Mon/Wed/Fri      Continuous medications     PRN medications  PRN medications: acetaminophen, dextromethorphan-guaifenesin, diphenhydrAMINE, guaiFENesin, hydrOXYzine HCL, morphine, oxyCODONE-acetaminophen, promethazine **OR** promethazine, vancomycin   Meds:   aspirin, 81 mg, Daily  atorvastatin, 40 mg, Daily  calcitriol, 0.5 mcg, Daily  docusate sodium, 100 mg, BID  [START ON 12/8/2024] ergocalciferol, 1,250 mcg, Every Sunday  lactulose, 20 g, TID  levETIRAcetam, 750 mg, Nightly  pregabalin, 50 mg, BID  vancomycin, 750 mg, Every Mon/Wed/Fri         acetaminophen, 650 mg, q6h PRN  dextromethorphan-guaifenesin, 5 mL, q4h PRN  diphenhydrAMINE, 50 mg, Daily PRN  guaiFENesin, 600 mg, q12h PRN  hydrOXYzine HCL, 25 mg, q6h PRN  morphine, 1 mg, q3h PRN  oxyCODONE-acetaminophen, 1 tablet, q6h PRN  promethazine, 25 mg, q6h PRN   Or  promethazine, 25 mg, q12h PRN  vancomycin, , Daily PRN        Heart Rate:  [63-79]   Temp:  [35.7 °C (96.3 °F)-37.4 °C (99.3 °F)]   Resp:  [16-18]   BP: (129-145)/(74-84)   Weight:  [74 kg (163 lb 3.2 oz)]   SpO2:  [98 %]    Weight: 71.5 kg (157 lb 10.1 oz)     General appearance: Awake and alert, oriented, . No distress  HEENT: supple, moist oral mucosa, no mouth ulcers  Neck: No JVD  Skin: no apparent rash  Heart: heart sounds 1 & 2 present and normal, no murmurs heard or friction rub  Lungs: Adequate air entry,  no wheezing/crackles  Abdomen: " soft, non tender, no masses palpated, no flank tenderness  Extremities: No  edema, no joint swelling,  : deferred  Neuro: No FND, no asterixis   ACCESS: Tunneled hemodialysis catheter in place      Results from last 72 hours   Lab Units 12/04/24  0623   SODIUM mmol/L 133*   POTASSIUM mmol/L 4.7   CO2 mmol/L 18*   BUN mg/dL 65*   CREATININE mg/dL 9.33*   PHOSPHORUS mg/dL 5.5*   CALCIUM mg/dL 7.5*   ALBUMIN g/dL 3.1*   GLUCOSE mg/dL 91   WBC AUTO x10*3/uL 7.5        Results from last 7 days   Lab Units 12/04/24  0623 12/03/24  0533 12/02/24  1448   WBC AUTO x10*3/uL 7.5 13.3* 17.7*   HEMOGLOBIN g/dL 10.6* 9.7* 11.1*   HEMATOCRIT % 34.0* 33.2* 35.9*   PLATELETS AUTO x10*3/uL 146* 142* 188         A/P  ESRD on TTS chesim, @[facility] [place], under care of Dr Jefferson  -HD admitted with CLABSI/sepsis.  Currently on vancomycin.  ID on board.  Plan for hemodialysis catheter exchange-discussed with ID today possibly over a guidewire as she is a very hard stick.  Pending repeat blood cultures     Plan HD today per submitted orders, UF   as tolerated  MBD -not on phosphate binders anemia of CKD: EPO to be given as an outpatient x 3 per week   Access: Infection  BP: acceptable during treatment   Renal Diet   Daily renal MVI   Continue 3 x per week hemodialysis; SW to ensure availability of o/p HD chair at discharge  Please communicate any antibiotic needs prior to discharge directly with the dialysis unit     Magdi Dahl MD

## 2024-12-04 NOTE — CARE PLAN
The patient's goals for the shift include  get some sleep    The clinical goals for the shift include pt. will have itching and pain controlled overnight    Over the shift, the patient did not make progress toward the following goals. Barriers to progression include frequent checks. Recommendations to address these barriers include grouping nursing care.

## 2024-12-05 ENCOUNTER — APPOINTMENT (OUTPATIENT)
Dept: CARDIOLOGY | Facility: HOSPITAL | Age: 49
End: 2024-12-05
Payer: MEDICARE

## 2024-12-05 ENCOUNTER — APPOINTMENT (OUTPATIENT)
Dept: DIALYSIS | Facility: HOSPITAL | Age: 49
End: 2024-12-05
Payer: MEDICARE

## 2024-12-05 LAB
ALBUMIN SERPL BCP-MCNC: 3.2 G/DL (ref 3.4–5)
ANION GAP SERPL CALC-SCNC: 20 MMOL/L (ref 10–20)
ATRIAL RATE: 71 BPM
ATRIAL RATE: 92 BPM
BACTERIA BLD AEROBE CULT: ABNORMAL
BACTERIA BLD AEROBE CULT: ABNORMAL
BACTERIA BLD CULT: ABNORMAL
BACTERIA BLD CULT: ABNORMAL
BASOPHILS # BLD AUTO: 0.02 X10*3/UL (ref 0–0.1)
BASOPHILS NFR BLD AUTO: 0.5 %
BUN SERPL-MCNC: 28 MG/DL (ref 6–23)
CALCIUM SERPL-MCNC: 7.8 MG/DL (ref 8.6–10.3)
CHLORIDE SERPL-SCNC: 94 MMOL/L (ref 98–107)
CK SERPL-CCNC: 28 U/L (ref 0–215)
CO2 SERPL-SCNC: 25 MMOL/L (ref 21–32)
CREAT SERPL-MCNC: 4.99 MG/DL (ref 0.5–1.05)
EGFRCR SERPLBLD CKD-EPI 2021: 10 ML/MIN/1.73M*2
EOSINOPHIL # BLD AUTO: 0.12 X10*3/UL (ref 0–0.7)
EOSINOPHIL NFR BLD AUTO: 3 %
ERYTHROCYTE [DISTWIDTH] IN BLOOD BY AUTOMATED COUNT: 17.1 % (ref 11.5–14.5)
GLUCOSE SERPL-MCNC: 90 MG/DL (ref 74–99)
GRAM STN SPEC: ABNORMAL
HCT VFR BLD AUTO: 33.7 % (ref 36–46)
HGB BLD-MCNC: 10.1 G/DL (ref 12–16)
IMM GRANULOCYTES # BLD AUTO: 0.01 X10*3/UL (ref 0–0.7)
IMM GRANULOCYTES NFR BLD AUTO: 0.3 % (ref 0–0.9)
LYMPHOCYTES # BLD AUTO: 0.86 X10*3/UL (ref 1.2–4.8)
LYMPHOCYTES NFR BLD AUTO: 21.8 %
MAGNESIUM SERPL-MCNC: 2.03 MG/DL (ref 1.6–2.4)
MCH RBC QN AUTO: 28 PG (ref 26–34)
MCHC RBC AUTO-ENTMCNC: 30 G/DL (ref 32–36)
MCV RBC AUTO: 93 FL (ref 80–100)
MONOCYTES # BLD AUTO: 0.51 X10*3/UL (ref 0.1–1)
MONOCYTES NFR BLD AUTO: 12.9 %
NEUTROPHILS # BLD AUTO: 2.43 X10*3/UL (ref 1.2–7.7)
NEUTROPHILS NFR BLD AUTO: 61.5 %
NRBC BLD-RTO: 0 /100 WBCS (ref 0–0)
P AXIS: 51 DEGREES
P AXIS: 65 DEGREES
P OFFSET: 190 MS
P ONSET: 133 MS
PHOSPHATE SERPL-MCNC: 3.7 MG/DL (ref 2.5–4.9)
PLATELET # BLD AUTO: 142 X10*3/UL (ref 150–450)
POTASSIUM SERPL-SCNC: 4.2 MMOL/L (ref 3.5–5.3)
PR INTERVAL: 144 MS
PR INTERVAL: 176 MS
Q ONSET: 221 MS
Q ONSET: 221 MS
QRS COUNT: 12 BEATS
QRS COUNT: 15 BEATS
QRS DURATION: 72 MS
QRS DURATION: 74 MS
QT INTERVAL: 338 MS
QT INTERVAL: 420 MS
QTC CALCULATION(BAZETT): 417 MS
QTC CALCULATION(BAZETT): 456 MS
QTC FREDERICIA: 389 MS
QTC FREDERICIA: 444 MS
R AXIS: -8 DEGREES
R AXIS: 50 DEGREES
RBC # BLD AUTO: 3.61 X10*6/UL (ref 4–5.2)
SODIUM SERPL-SCNC: 135 MMOL/L (ref 136–145)
STAPHYLOCOCCUS SPEC CULT: ABNORMAL
T AXIS: 53 DEGREES
T AXIS: 55 DEGREES
T OFFSET: 390 MS
T OFFSET: 431 MS
VENTRICULAR RATE: 71 BPM
VENTRICULAR RATE: 92 BPM
WBC # BLD AUTO: 4 X10*3/UL (ref 4.4–11.3)

## 2024-12-05 PROCEDURE — 80069 RENAL FUNCTION PANEL: CPT | Performed by: INTERNAL MEDICINE

## 2024-12-05 PROCEDURE — 2500000004 HC RX 250 GENERAL PHARMACY W/ HCPCS (ALT 636 FOR OP/ED): Performed by: INTERNAL MEDICINE

## 2024-12-05 PROCEDURE — 87040 BLOOD CULTURE FOR BACTERIA: CPT | Mod: GEALAB | Performed by: INTERNAL MEDICINE

## 2024-12-05 PROCEDURE — 2500000001 HC RX 250 WO HCPCS SELF ADMINISTERED DRUGS (ALT 637 FOR MEDICARE OP): Performed by: INTERNAL MEDICINE

## 2024-12-05 PROCEDURE — 83735 ASSAY OF MAGNESIUM: CPT | Performed by: INTERNAL MEDICINE

## 2024-12-05 PROCEDURE — 2060000001 HC INTERMEDIATE ICU ROOM DAILY

## 2024-12-05 PROCEDURE — 36415 COLL VENOUS BLD VENIPUNCTURE: CPT | Performed by: INTERNAL MEDICINE

## 2024-12-05 PROCEDURE — 2500000001 HC RX 250 WO HCPCS SELF ADMINISTERED DRUGS (ALT 637 FOR MEDICARE OP): Performed by: NURSE PRACTITIONER

## 2024-12-05 PROCEDURE — 93005 ELECTROCARDIOGRAM TRACING: CPT

## 2024-12-05 PROCEDURE — 8010000001 HC DIALYSIS - HEMODIALYSIS PER DAY

## 2024-12-05 PROCEDURE — 99232 SBSQ HOSP IP/OBS MODERATE 35: CPT | Performed by: INTERNAL MEDICINE

## 2024-12-05 PROCEDURE — 85025 COMPLETE CBC W/AUTO DIFF WBC: CPT | Performed by: INTERNAL MEDICINE

## 2024-12-05 PROCEDURE — 82550 ASSAY OF CK (CPK): CPT | Performed by: INTERNAL MEDICINE

## 2024-12-05 PROCEDURE — 2500000004 HC RX 250 GENERAL PHARMACY W/ HCPCS (ALT 636 FOR OP/ED): Performed by: STUDENT IN AN ORGANIZED HEALTH CARE EDUCATION/TRAINING PROGRAM

## 2024-12-05 PROCEDURE — 99223 1ST HOSP IP/OBS HIGH 75: CPT | Performed by: STUDENT IN AN ORGANIZED HEALTH CARE EDUCATION/TRAINING PROGRAM

## 2024-12-05 RX ORDER — OXYCODONE HYDROCHLORIDE 5 MG/1
10 TABLET ORAL EVERY 6 HOURS PRN
Status: DISPENSED | OUTPATIENT
Start: 2024-12-05

## 2024-12-05 RX ORDER — DIPHENHYDRAMINE HYDROCHLORIDE 50 MG/ML
12.5 INJECTION INTRAMUSCULAR; INTRAVENOUS AS NEEDED
Status: DISCONTINUED | OUTPATIENT
Start: 2024-12-05 | End: 2024-12-05

## 2024-12-05 RX ORDER — DIPHENHYDRAMINE HYDROCHLORIDE 50 MG/ML
50 INJECTION INTRAMUSCULAR; INTRAVENOUS DAILY PRN
Status: ACTIVE | OUTPATIENT
Start: 2024-12-05

## 2024-12-05 RX ORDER — METOPROLOL TARTRATE 50 MG/1
50 TABLET ORAL 2 TIMES DAILY
Status: DISPENSED | OUTPATIENT
Start: 2024-12-05

## 2024-12-05 RX ORDER — DIPHENHYDRAMINE HYDROCHLORIDE 50 MG/ML
25 INJECTION INTRAMUSCULAR; INTRAVENOUS ONCE AS NEEDED
Status: COMPLETED | OUTPATIENT
Start: 2024-12-05 | End: 2024-12-05

## 2024-12-05 RX ORDER — DIPHENHYDRAMINE HYDROCHLORIDE 50 MG/ML
25 INJECTION INTRAMUSCULAR; INTRAVENOUS ONCE
Status: DISCONTINUED | OUTPATIENT
Start: 2024-12-05 | End: 2024-12-05

## 2024-12-05 RX ORDER — OXYCODONE HYDROCHLORIDE 5 MG/1
5 TABLET ORAL EVERY 6 HOURS PRN
Status: ACTIVE | OUTPATIENT
Start: 2024-12-05

## 2024-12-05 RX ORDER — DIPHENHYDRAMINE HCL 25 MG
25 CAPSULE ORAL EVERY 6 HOURS PRN
Status: DISCONTINUED | OUTPATIENT
Start: 2024-12-05 | End: 2024-12-06

## 2024-12-05 RX ORDER — ACETAMINOPHEN 325 MG/1
975 TABLET ORAL EVERY 8 HOURS
Status: DISPENSED | OUTPATIENT
Start: 2024-12-05

## 2024-12-05 RX ORDER — DIPHENHYDRAMINE HYDROCHLORIDE 50 MG/ML
50 INJECTION INTRAMUSCULAR; INTRAVENOUS AS NEEDED
Status: DISPENSED | OUTPATIENT
Start: 2024-12-05

## 2024-12-05 RX ORDER — DIPHENHYDRAMINE HYDROCHLORIDE 50 MG/ML
25 INJECTION INTRAMUSCULAR; INTRAVENOUS ONCE
Status: COMPLETED | OUTPATIENT
Start: 2024-12-05 | End: 2024-12-05

## 2024-12-05 ASSESSMENT — COGNITIVE AND FUNCTIONAL STATUS - GENERAL
DAILY ACTIVITIY SCORE: 24
CLIMB 3 TO 5 STEPS WITH RAILING: A LITTLE
MOBILITY SCORE: 23

## 2024-12-05 ASSESSMENT — PAIN SCALES - GENERAL
PAINLEVEL_OUTOF10: 10 - WORST POSSIBLE PAIN
PAINLEVEL_OUTOF10: 6
PAINLEVEL_OUTOF10: 10 - WORST POSSIBLE PAIN
PAINLEVEL_OUTOF10: 10 - WORST POSSIBLE PAIN
PAINLEVEL_OUTOF10: 6
PAINLEVEL_OUTOF10: 10 - WORST POSSIBLE PAIN
PAINLEVEL_OUTOF10: 6
PAINLEVEL_OUTOF10: 10 - WORST POSSIBLE PAIN

## 2024-12-05 ASSESSMENT — ENCOUNTER SYMPTOMS
PALPITATIONS: 1
CHEST TIGHTNESS: 1

## 2024-12-05 ASSESSMENT — PAIN - FUNCTIONAL ASSESSMENT
PAIN_FUNCTIONAL_ASSESSMENT: 0-10
PAIN_FUNCTIONAL_ASSESSMENT: NO/DENIES PAIN

## 2024-12-05 ASSESSMENT — PAIN DESCRIPTION - LOCATION: LOCATION: GENERALIZED

## 2024-12-05 ASSESSMENT — PAIN SCALES - PAIN ASSESSMENT IN ADVANCED DEMENTIA (PAINAD): TOTALSCORE: MEDICATION (SEE MAR)

## 2024-12-05 NOTE — PROGRESS NOTES
Vancomycin Dosing by Pharmacy- Cessation of Therapy    Consult to pharmacy for vancomycin dosing has been discontinued by the prescriber, pharmacy will sign off at this time.    Please call pharmacy if there are further questions or re-enter a consult if vancomycin is resumed.     Johanna Escalante, PharmD

## 2024-12-05 NOTE — SIGNIFICANT EVENT
Recurrent episodes of SVT, nonsustained VT and sustained VT throughout the night.  Magnesium is within normal limit.  Patient is a dialysis patient.  She was seen and examined.  She was started on amiodarone drip.  Cardiac arrhythmias improved.  Will follow-up with cardiology team.

## 2024-12-05 NOTE — DOCUMENTATION CLARIFICATION NOTE
"    PATIENT:               RITA TEMPLE  ACCT #:                  [id number]  MRN:                       [id number]  :                       1975  ADMIT DATE:       12/3/2024 12:53 AM  DISCH DATE:  RESPONDING PROVIDER #:        [id number]          PROVIDER RESPONSE TEXT:    Sepsis with neurological organ dysfunction of metabolic encephalopathy    CDI QUERY TEXT:    Clarification    Instruction:    Based on your assessment of the patient and the clinical information, please provide the requested documentation by clicking on the appropriate radio button and enter any additional information if prompted.    Question: Please further clarify if a relationship exists between the Sepsis and acute organ dysfunction    When answering this query, please exercise your independent professional judgment. The fact that a question is being asked, does not imply that any particular answer is desired or expected.    The patient's clinical indicators include:  Clinical Information: 49 year old female, BMI 25.45, presenting with sepsis, likely CLABSI, metabolic encephalopathy, and ESRD on HD.    Clinical Indicators:  Per VS (12/3-):  Temp 39.4  HR 95, 92, 92  RR 22  BP 87/46, 85/48 (12/3)  MAP 60, 60 (12/3)    Per Labs (12/3-):  WBC 13.3, 7.5, 4.0     and  BC: Not yet resulted    Per 12/3 H/P: \"Acute metabolic encephalopathy...Sepsis...Pt with gram +ve cocci bacteremia and likely line sepsis (has tunneled left subclavian HD catheter)\"    Treatment:  ID consult, Vanco 750mg IV qMon/Wed/Fri (), Zosyn 3.375g IV x1 (), Zosyn 2.25g IV q8hrs (12/3), NS IV bolus 2500ml x1 (), NS IV bolus 250ml x1 (12/3), repeated VS, repeated labs, supportive care.    Risk Factors: 49 year old female being treated for sepsis and metabolic encephalopathy.  Options provided:  -- Sepsis with neurological organ dysfunction of metabolic encephalopathy  -- Sepsis with other organ dysfunction- Please Specify, Please specify " sepsis associated organ dysfunction below  -- Other - I will add my own diagnosis  -- Refer to Clinical Documentation Reviewer    Query created by: Courtney Shaffer on 12/5/2024 10:20 AM      Electronically signed by:  LEONARDO HERNANDEZ DO 12/5/2024 12:33 PM

## 2024-12-05 NOTE — PROGRESS NOTES
[facility] Hospitalist Progress Note       [id number]: Please page me for patient care issues.  [phone]: Please page night hospitalist for any issues.     Batsheva Page  :  1975(49 y.o.)  MRN:  [id number]  PCP: Zhou Pedersen MD      Principal Problem:    Sepsis (Multi)      Assessment and Plan:     Batsheva Page is a 49 y.o. 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 Patient presented to the ED due to AMS fever (102oF) and rigor. Patient's  last HD session was 24 and the dialysis center enoch blood cultures was positive for gram +ve cocci in clusters  In the ED were remarkable mainly for a wbc of 17.7. CXR was read as showing a developing right basal infiltrate.  She was given a dose each of vancomycin and zosyn and was transferred to [facility] for further care and management.     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  #ESRD on HD (MWF)   #RLL infiltrate on CXR, asymptomatic  #Acute metabolic encephalopathy, resolved  #Overweight BMI 25-29.9, Body mass index is 26.34 kg/m².  -Vanc/zosyn->vanc->Daptomycin pending repeat Bcx  -TTE (24) w/o e/o of endocarditis. LAURA by cards on 24  -CS surgery for HD line removal under sedation 2/2 severe pain from scarring 2/2 multiple prior HD line. Will need line holiday pending neg repeat Bcx.   -New line to be placed by IR under sedation on the week of  pending neg repeat BCx  -resume rest of home meds as indicated  -CS notes reviewed and recs appreciated: ID, nephrology     Disposition: await test results, await consultant recommendations, and await clinical improvement    DVT Prophylaxis: Subq Heparin    Code status: Full Code  Diet: Adult diet Regular, Renal;  Potassium Restricted 2 gm (50mEq); 2 - 3 grams Sodium  NPO Diet; Effective midnight    Level of MDM:  High    patient and family updated, I personally examined the patient, and I personally reviewed chart, data, labs radiology reports    Family Communication  Number :   Name of Designated Family Representative:       Total time spent: 35 minutes, of total time providing counseling or in coordination of care. Total time on this day of visit includes record and documentation review before and after visit including documentation and time not explicitly included on EMR time stamp      Subjective:   Interval History:  HPI  The patient complains of bacteremia  The patient feels their symptoms areunchanged  no events or new concerns    Scheduled Meds:acetaminophen, 975 mg, oral, q8h  aspirin, 81 mg, oral, Daily  atorvastatin, 40 mg, oral, Daily  calcitriol, 0.5 mcg, oral, Daily  ceftaroline, 200 mg, intravenous, q12h  daptomycin, 6 mg/kg (Adjusted), intravenous, q48h  docusate sodium, 100 mg, oral, BID  [START ON 12/8/2024] ergocalciferol, 1,250 mcg, oral, Every Sunday  heparin, 1,900 Units, intra-catheter, After Dialysis  heparin, 1,900 Units, intra-catheter, After Dialysis  lactulose, 20 g, oral, TID  levETIRAcetam, 750 mg, oral, Nightly  metoprolol tartrate, 50 mg, oral, BID  pregabalin, 50 mg, oral, BID      Continuous Infusions:   PRN Meds:PRN medications: dextromethorphan-guaifenesin, diphenhydrAMINE, guaiFENesin, HYDROmorphone, hydrOXYzine HCL, oxyCODONE **OR** oxyCODONE, promethazine **OR** promethazine    Review of Systems   All other systems reviewed and are negative.    Interval Pertinent History:  Social History     Tobacco Use    Smoking status: Never    Smokeless tobacco: Never   Substance Use Topics    Alcohol use: Never         Objective:   Patient Vitals for the past 24 hrs:   BP Temp Temp src Pulse Resp SpO2   12/05/24 1202 (!) 177/102 36.2 °C (97.1 °F) Temporal 73 16 97 %   12/05/24 0749 (!) 175/95 -- --  -- -- --   24 0618 (!) 168/98 -- -- -- -- --   24 0517 (!) 162/97 36.5 °C (97.7 °F) Temporal 70 18 100 %   24 2344 (!) 151/97 36.9 °C (98.4 °F) Temporal 92 18 96 %   245 115/80 36.3 °C (97.4 °F) Temporal 92 18 96 %   24 1731 -- 37.3 °C (99.2 °F) Temporal 95 -- --       Average, Min, and Max for last 24 hours Vitals:  TEMPERATURE:  Temp  Av.7 °C (98 °F)  Min: 36.2 °C (97.1 °F)  Max: 37.3 °C (99.2 °F)    RESPIRATIONS RANGE: Resp  Av.5  Min: 16  Max: 18    PULSE RANGE: Pulse  Av.4  Min: 70  Max: 95    BLOOD PRESSURE RANGE:  Systolic (24hrs), Av , Min:115 , Max:177   ; Diastolic (24hrs), Av, Min:80, Max:102      PULSE OXIMETRY RANGE: SpO2  Av.3 %  Min: 96 %  Max: 100 %  Body mass index is 26.34 kg/m².    I/O last 3 completed shifts:  In: 1120 (15.1 mL/kg) [P.O.:120; I.V.:600 (8.1 mL/kg); Other:400]  Out: 1400 (18.9 mL/kg) [Other:1400]  Weight: 74 kg   Weight change:      Physical Exam  Vitals and nursing note reviewed.   Constitutional:       Appearance: Normal appearance.   HENT:      Head: Normocephalic and atraumatic.      Right Ear: External ear normal.      Left Ear: External ear normal.      Nose: Nose normal.      Mouth/Throat:      Mouth: Mucous membranes are moist.   Eyes:      General: No scleral icterus.        Right eye: No discharge.         Left eye: No discharge.      Extraocular Movements: Extraocular movements intact.      Conjunctiva/sclera: Conjunctivae normal.      Pupils: Pupils are equal, round, and reactive to light.   Cardiovascular:      Rate and Rhythm: Normal rate and regular rhythm.      Heart sounds: Murmur heard.   Pulmonary:      Effort: Pulmonary effort is normal.      Breath sounds: Normal breath sounds.   Chest:      Comments: Right anterior chest wall dialysis cath in place  Abdominal:      General: Abdomen is flat. Bowel sounds are normal.      Palpations: Abdomen is soft.   Musculoskeletal:         General: Normal range  of motion.      Right lower leg: No edema.      Left lower leg: No edema.   Skin:     General: Skin is warm and dry.      Capillary Refill: Capillary refill takes less than 2 seconds.   Neurological:      General: No focal deficit present.   Psychiatric:         Mood and Affect: Mood normal.         Thought Content: Thought content normal.         Judgment: Judgment normal.         Lab Results   Component Value Date     (L) 12/05/2024    K 4.2 12/05/2024    CL 94 (L) 12/05/2024    CO2 25 12/05/2024    BUN 28 (H) 12/05/2024    CREATININE 4.99 (H) 12/05/2024    GLUCOSE 90 12/05/2024    CALCIUM 7.8 (L) 12/05/2024    PROT 8.5 (H) 12/02/2024    BILITOT 0.7 12/02/2024    ALKPHOS 106 12/02/2024    AST 18 12/02/2024    ALT 8 12/02/2024    GLOB 4.1 (H) 09/21/2023       Lab Results   Component Value Date    WBC 4.0 (L) 12/05/2024    HGB 10.1 (L) 12/05/2024    HCT 33.7 (L) 12/05/2024    MCV 93 12/05/2024     (L) 12/05/2024    LYMPHOPCT 21.8 12/05/2024    RBC 3.61 (L) 12/05/2024    MCH 28.0 12/05/2024    MCHC 30.0 (L) 12/05/2024    RDW 17.1 (H) 12/05/2024    CRP 27.39 (H) 12/03/2024       Lab Results   Component Value Date    TSH 2.12 04/28/2024     Lab Results   Component Value Date    LACTATE 1.4 12/02/2024    DDIMER 3.81 (H) 10/26/2022    INR 1.4 (H) 04/28/2024       IMAGES:  Encounter Date: 12/03/24   ECG 12 lead   Result Value    Ventricular Rate 92    Atrial Rate 92    GA Interval 144    QRS Duration 74    QT Interval 338    QTC Calculation(Bazett) 417    P Axis 51    R Axis -8    T Axis 53    QRS Count 15    Q Onset 221    T Offset 390    QTC Fredericia 389    Narrative    Sinus rhythm with Premature atrial complexes  Possible Inferior infarct , age undetermined  Cannot rule out Anterior infarct , age undetermined  Abnormal ECG  When compared with ECG of 02-DEC-2024 14:59, (unconfirmed)  Premature atrial complexes are now Present  Borderline criteria for Inferior infarct are now Present        Transthoracic  Echo (TTE) Complete    Result Date: 12/4/2024   [facility], [address] [address]               Tel [phone] and Fax [phone] TRANSTHORACIC ECHOCARDIOGRAM REPORT  Patient Name:       RITA PABLO          Cosme Physician:    [id number] Lyndsey Moncada MD Study Date:         12/4/2024           Ordering Provider:    [id number] FRANCO MRN/PID:            [id number]            Fellow: Accession#:         [id number]        Nurse: Date of Birth/Age:  1975 / 49     Sonographer:          Yun hirsch RDCS Gender assigned at  F                   Additional Staff: Birth: Height:             167.64 cm           Admit Date:           12/3/2024 Weight:             71.21 kg            Admission Status:     Inpatient -                                                               Routine BSA / BMI:          1.80 m2 / 25.34     Encounter#:           [id number]                     kg/m2 Blood Pressure:     145/74 mmHg         Department Location:  [facility] Non                                                               Invasive Study Type:    TRANSTHORACIC ECHO (TTE) COMPLETE Diagnosis/ICD: 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 Pertinent History: Fever, CAD and ESRD,AV= Inspiris 21mm, MV= EPIC St. Devonte                    27mm. Study Detail: The following Echo studies were  performed: 2D, M-Mode, Doppler and               color flow. Patient has a pacemaker.               The patient was awake.  PHYSICIAN INTERPRETATION: Left Ventricle: The left ventricular systolic function is normal, with a visually estimated ejection fraction of 65-70%. The left ventricular cavity size is normal. There is mildly increased septal and mildly increased posterior left ventricular wall thickness. There is left ventricular concentric remodeling. Abnormal (paradoxical) septal motion consistent with post-operative status. Left ventricular diastolic filling cannot be determined, due to mitral valve repair/replacement. Left Atrium: The left atrium was not well visualized. Right Ventricle: The right ventricle is mildly enlarged. There is normal right ventricular global systolic function. RV free wall is not well visualized. Right Atrium: The right atrium is normal in size. Aortic Valve: There is a prosthetic aortic valve present. The aortic valve dimensionless index is 0.46. There is Inspiris bioprosthetic type aortic valve bioprosthesis with a 21 mm reported size. Echo findings are consistent with normal aortic valve prosthesis structure and function. There is no evidence of aortic valve regurgitation. The peak instantaneous gradient of the aortic valve is 33 mmHg. The mean gradient of the aortic valve is 18 mmHg. The valve is not well visualized in the short axis views. No obvious vegetation. Mitral Valve: There is a prosthetic mitral valve present. There is a [place]. Devonte bioprosthetic type mitral valve prosthesis with a 27 mm reported size. The peak and mean gradients are 13 mmHg and 6 mmHg respectively. The peak instantaneous gradient of the mitral valve is 13 mmHg. Echo findings are consistent with normal mitral valve prosthesis structure and function. There is trace mitral valve regurgitation. No obvious vegetation. Tricuspid Valve: Status post tricuspid valve repair. There is trace to mild tricuspid  regurgitation. The Doppler estimated RVSP is mildly elevated at 35.5 mmHg. Mean gradient is 2mmHg. No tricuspid stenosis. No obvious vegetation. Pulmonic Valve: The pulmonic valve is not well visualized. There is physiologic pulmonic valve regurgitation. Pericardium: There is no pericardial effusion noted. Aorta: The aortic root is normal. Systemic Veins: The inferior vena cava appears small in size, with IVC inspiratory collapse greater than 50%.  CONCLUSIONS:  1. Poorly visualized anatomical structures due to suboptimal image quality.  2. The left ventricular systolic function is normal, with a visually estimated ejection fraction of 65-70%.  3. Abnormal septal motion consistent with post-operative status.  4. There is normal right ventricular global systolic function.  5. Mildly enlarged right ventricle.  6. There is a St. Devonte bioprosthetic type mitral valve prosthesis with a 27 mm reported size. The peak and mean gradients are 13 mmHg and 6 mmHg respectively.  7. Status post tricuspid valve repair.  8. Mildly elevated right ventricular systolic pressure.  9. There is Inspiris bioprosthetic type aortic valve bioprosthesis with a 21 mm reported size. 10. Echo findings are consistent with normal aortic valve prosthesis structure and function. 11. Echo findings are consistent with normal mitral valve prosthesis structure and function. QUANTITATIVE DATA SUMMARY:  2D MEASUREMENTS:          Normal Ranges: IVSd:            0.98 cm  (0.6-1.1cm) LVPWd:           1.02 cm  (0.6-1.1cm) LVIDd:           3.23 cm  (3.9-5.9cm) LVIDs:           2.33 cm LV Mass Index:   51 g/m2 LVEDV Index:     41 ml/m2 LV % FS          27.9 %  LA VOLUME:                    Normal Ranges: LA Vol A4C:        32.3 ml    (22+/-6mL/m2) LA Vol A2C:        25.1 ml LA Vol BP:         31.8 ml LA Vol Index A4C:  17.9 ml/m2 LA Vol Index A2C:  13.9 ml/m2 LA Vol Index BP:   17.6 ml/m2 LA Area A4C:       13.5 cm2 LA Area A2C:       10.7 cm2 LA Major Axis A4C:  4.8 cm LA Major Axis A2C: 3.8 cm LA Vol A4C:        27.3 ml LA Vol A2C:        22.0 ml LA Vol Index BSA:  13.7 ml/m2  RA VOLUME BY A/L METHOD:          Normal Ranges: RA Area A4C:             16.4 cm2  AORTA MEASUREMENTS:         Normal Ranges: Ao Sinus, d:        2.00 cm (2.1-3.5cm) Asc Ao, d:          2.60 cm (2.1-3.4cm)  LV SYSTOLIC FUNCTION BY 2D PLANIMETRY (MOD):                      Normal Ranges: EF-A4C View:    62 % (>=55%) EF-A2C View:    72 % EF-Biplane:     67 % EF-Visual:      68 % LV EF Reported: 68 %  LV DIASTOLIC FUNCTION:             Normal Ranges: MV Peak E:             1.92 m/s    (0.7-1.2 m/s) MV Peak A:             1.01 m/s    (0.42-0.7 m/s) E/A Ratio:             1.90        (1.0-2.2) MV e'                  0.060 m/s   (>8.0) MV lateral e'          0.06 m/s MV medial e'           0.06 m/s MV A Dur:              117.03 msec E/e' Ratio:            32.04       (<8.0)  MITRAL VALVE:           Normal Ranges: MV Vmax:      1.79 m/s  (<=1.3m/s) MV peak P.9 mmHg (<5mmHg) MV mean P.8 mmHg  (<2mmHg) MV VTI:       49.37 cm  (10-13cm) MV DT:        238 msec  (150-240msec)  AORTIC VALVE:                      Normal Ranges: AoV Vmax:                2.87 m/s  (<=1.7m/s) AoV Peak P.8 mmHg (<20mmHg) AoV Mean P.2 mmHg (1.7-11.5mmHg) LVOT Max Jamarcus:            1.18 m/s  (<=1.1m/s) AoV VTI:                 53.97 cm  (18-25cm) LVOT VTI:                24.65 cm LVOT Diameter:           1.76 cm   (1.8-2.4cm) AoV Area, VTI:           1.11 cm2  (2.5-5.5cm2) AoV Area,Vmax:           1.00 cm2  (2.5-4.5cm2) AoV Dimensionless Index: 0.46  RIGHT VENTRICLE: RV Basal 4.20 cm RV Mid   3.40 cm RV Major 8.7 cm TAPSE:   13.0 mm RV s'    0.10 m/s  TRICUSPID VALVE/RVSP:          Normal Ranges: Peak TR Velocity:     2.85 m/s RV Syst Pressure:     36 mmHg  (< 30mmHg) IVC Diam:             1.00 cm  PULMONIC VALVE:          Normal Ranges: PV Max Jamarcus:     1.0 m/s  (0.6-0.9m/s) PV Max PG:       3.8 mmHg  AORTA: Asc Ao Diam 2.55 cm  [id number] Lyndsey Moncada MD Electronically signed on 12/4/2024 at 12:54:13 PM  ** Final **     Transesophageal Echo (LAURA)    Result Date: 5/1/2024           [address] [id number]            Phone [phone] TRANSESOPHAGEAL ECHOCARDIOGRAM REPORT  Patient Name:     RITA PABLO MAVERICK Reading Physician:   [id number] Flores DO Study Date:       5/1/2024             Ordering Provider:   [id number] FLORES MRN/PID:          [id number]             Fellow: Accession#:       [id number]         Nurse: Date of           1975 / 48      Sonographer:         Dionte Vasquez RDCS Birth/Age:        years Gender:           F                    Additional Staff: Height:           172.00 cm            Admit Date: Weight:           70.31 kg             Admission Status:    Inpatient - Routine BSA / BMI:        1.83 m2 / 23.77      Department Location: [facility] ICU                   kg/m2 Blood Pressure: 153 /72 mmHg Study Type:    TRANSESOPHAGEAL ECHO (LAURA) Diagnosis/ICD: Viral endocarditis-B33.21 Indication:    Endocarditis CPT Codes:     LAURA Complete-[id number]  Study Detail: The following Echo studies were performed: 2D, M-Mode and color               flow.  PHYSICIAN INTERPRETATION: LAURA Details: The LAURA probe used was [id number]. Technically adequate omniplane transesophageal echocardiogram performed. LAURA Medication: The patient was sedated by Anesthesia; please refer to anesthesia flow sheet for medications used. LAURA Procedure: The probe was passed without difficulty. The following complication was encountered during the procedure: Patient tolerated the procedure well without any apparent complications. Left Ventricle: Left ventricular systolic function is normal. There are no regional wall motion abnormalities. The left ventricular cavity size is normal. Left ventricular diastolic filling  was indeterminate. Left Atrium: The left atrium is normal in size. There is a normal sized left atrial appendage, there is no thrombus visualized in the left atrial appendage and the left atrial appendage Doppler velocities are normal. Right Ventricle: The right ventricle is normal in size. There is normal right ventricular global systolic function. Right Atrium: The right atrium was not well visualized. Aortic Valve: There is a prosthetic aortic valve present. Echo findings are consistent with normal aortic valve prosthesis structure and function. There is no evidence of aortic valve regurgitation. Mitral Valve: There is a prosthetic mitral valve present. Echo findings are consistent with normal mitral valve prosthesis structure and function. There is trace mitral valve regurgitation. Trace prosthetic mitral regurgitation. Mobile echodensity previously seen is not visualized. Tricuspid Valve: The tricuspid valve was not well visualized. Tricuspid regurgitation was not assessed. Pulmonic Valve: The pulmonic valve is not well visualized. The pulmonic valve regurgitation was not well visualized. Pericardium: There is no pericardial effusion noted. Aorta: The aortic root is normal.  CONCLUSIONS:  1. Left ventricular systolic function is normal. QUANTITATIVE DATA SUMMARY:  [id number] Fabian Skaggs DO Electronically signed on 5/1/2024 at 3:47:59 PM  ** Final **     Transthoracic Echo (TTE) Complete    Result Date: 4/29/2024           [place] [address]            Phone [phone] TRANSTHORACIC ECHOCARDIOGRAM REPORT  Patient Name:      RITA PABLO MAVERICK  Reading Physician:    [id number] Fabian Skaggs DO Study Date:        4/29/2024             Ordering Provider:    [id number] LIZAMA MRN/PID:           [id number]              Fellow: Accession#:         [id number]          Nurse: Date of Birth/Age: 1975 / 48 years Sonographer:          Dionte Vasquez RDCS Gender:            F                     Additional Staff: Height:            170.00 cm             Admit Date: Weight:            69.00 kg              Admission Status:     Inpatient -                                                                Routine BSA / BMI:         1.80 m2 / 23.88 kg/m2 Department Location:  [facility] Blood Pressure: 144 /36 mmHg Study Type:    TRANSTHORACIC ECHO (TTE) COMPLETE Diagnosis/ICD: Endocarditis, valve unspecified-I38 Indication:    Endocarditis CPT Codes:     Echo Complete w Full Doppler-[id number] Patient History: Pertinent History: CAD, Chest Pain, CHF, HTN and Hyperlipidemia. Pacemaker,                    ESRD, MRSA, TVr-2013/Ring, MVR-2022/#27 St Devonte, AVR-2022/#21                    Inspirus. Study Detail: The following Echo studies were performed: 2D, M-Mode, Doppler and               color flow. Technically challenging study due to poor acoustic               windows and patient lying in supine position.  PHYSICIAN INTERPRETATION: Left Ventricle: Left ventricular systolic function is normal, with an estimated ejection fraction of 65-70%. There are no regional wall motion abnormalities. The left ventricular cavity size is normal. Spectral Doppler shows an impaired relaxation pattern of left ventricular diastolic filling. Left Atrium: The left atrium is normal in size. Right Ventricle: The right ventricle is normal in size. There is normal right ventricular global systolic function. Right Atrium: The right atrium is normal in size. Aortic Valve: There is a prosthetic aortic valve present. There is bioprosthetic aortic valve. Echo findings are consistent with normal aortic valve prosthesis structure and function. There is no evidence of aortic valve regurgitation. The peak instantaneous gradient of the  aortic valve is 50.7 mmHg. The mean gradient of the aortic valve is 27.0 mmHg. Mitral Valve: There is a prosthetic mitral valve present. There is a normally functioning mitral valve prosthesis. There is no evidence of mitral valve regurgitation. Tricuspid Valve: The tricuspid valve is structurally normal. There is trace tricuspid regurgitation. Pulmonic Valve: The pulmonic valve is not well visualized. The pulmonic valve regurgitation was not well visualized. Pericardium: There is no pericardial effusion noted. Aorta: The aortic root is normal.  CONCLUSIONS:  1. Left ventricular systolic function is normal with a 65-70% estimated ejection fraction.  2. Spectral Doppler shows an impaired relaxation pattern of left ventricular diastolic filling.  3. There is a normally functioning mitral valve prosthesis.  4. There is a bioprosthetic aortic valve. QUANTITATIVE DATA SUMMARY: 2D MEASUREMENTS:                          Normal Ranges: LAs:           3.30 cm   (2.7-4.0cm) IVSd:          0.99 cm   (0.6-1.1cm) LVPWd:         0.96 cm   (0.6-1.1cm) LVIDd:         3.93 cm   (3.9-5.9cm) LVIDs:         2.52 cm LV Mass Index: 66.4 g/m2 LV % FS        35.9 % LA VOLUME:                               Normal Ranges: LA Vol A4C:        53.4 ml    (22+/-6mL/m2) LA Vol A2C:        38.7 ml LA Vol BP:         47.0 ml LA Vol Index A4C:  29.7ml/m2 LA Vol Index A2C:  21.5 ml/m2 LA Vol Index BP:   26.2 ml/m2 LA Area A4C:       17.0 cm2 LA Area A2C:       14.0 cm2 LA Major Axis A4C: 4.6 cm LA Major Axis A2C: 4.3 cm LA Volume Index:   25.0 ml/m2 LA Vol A4C:        49.0 ml LA Vol A2C:        37.0 ml RA VOLUME BY A/L METHOD:                       Normal Ranges: RA Area A4C: 16.0 cm2 LV SYSTOLIC FUNCTION BY 2D PLANIMETRY (MOD):                     Normal Ranges: EF-A4C View: 59.6 % (>=55%) EF-A2C View: 65.1 % EF-Biplane:  61.8 % LV DIASTOLIC FUNCTION:                     Normal Ranges: MV Peak E: 1.56 m/s (0.7-1.2 m/s) MV Peak A: 1.58 m/s (0.42-0.7  m/s) E/A Ratio: 0.99     (1.0-2.2) MITRAL VALVE:                       Normal Ranges: MV Vmax:    1.66 m/s  (<=1.3m/s) MV peak P.0 mmHg (<5mmHg) MV mean P.0 mmHg  (<48mmHg) MV DT:      254 msec  (150-240msec) AORTIC VALVE:                                    Normal Ranges: AoV Vmax:                3.56 m/s  (<=1.7m/s) AoV Peak P.7 mmHg (<20mmHg) AoV Mean P.0 mmHg (1.7-11.5mmHg) LVOT Max Jamarcus:            1.41 m/s  (<=1.1m/s) AoV VTI:                 64.40 cm  (18-25cm) LVOT VTI:                30.60 cm AoV Dimensionless Index: 0.48  RIGHT VENTRICLE: RV Basal 3.39 cm RV Mid   2.40 cm RV Major 6.2 cm TAPSE:   17.5 mm RV s'    0.12 m/s TRICUSPID VALVE/RVSP:                             Normal Ranges: Peak TR Velocity: 2.58 m/s RV Syst Pressure: 29.6 mmHg (< 30mmHg) IVC Diam:         1.41 cm PULMONIC VALVE:                         Normal Ranges: PV Accel Time: 92 msec  (>120ms) PV Max Jamarcus:    1.2 m/s  (0.6-0.9m/s) PV Max P.9 mmHg  Magdaleno lFores DO Electronically signed on 2024 at 2:07:09 PM  ** Final **     Transesophageal Echo (LAURA)    Result Date: 2024           [place] [address]            Phone [phone] TRANSESOPHAGEAL ECHOCARDIOGRAM REPORT  Patient Name:     RITA TEMPLE Reading Physician:   Magdaleno Flores DO Study Date:       2024            Ordering Provider:   Magdaleno FLORES MRN/PID:          [id number]             Fellow: Accession#:       [id number]         Nurse: Date of           1975      Sonographer:         Dionte Vasquez RDCS Birth/Age:        years Gender:           F                    Additional Staff: Height:           165.00 cm            Admit Date: Weight:           65.00 kg             Admission Status:    Inpatient - Routine BSA:              1.72 m2              Department Location: [facility] Blood Pressure: 142 /50 mmHg Study Type:    TRANSESOPHAGEAL ECHO (LAURA) Diagnosis/ICD: 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 ESRD, Pacemaker, s/p                    Redo-sternotomy with TVr-ring repair, AVR #21 Inspirus, MVR                    #27 [employer] Devonte Epic Bioprosthesis. Study Detail: The following Echo studies were performed: 2D, color flow and               Doppler.  PHYSICIAN INTERPRETATION: LAURA Details: The LAURA probe used was [id number]. Technically adequate omniplane transesophageal echocardiogram performed. Color flow Doppler echo was performed to assess for the presence of a patent foramen ovale. LAURA Medication: The patient was sedated by Anesthesia; please refer to anesthesia flow sheet for medications used. LAURA Procedure: The probe was passed without difficulty. Left Ventricle: Left ventricular systolic function is normal. There are no regional wall motion abnormalities. The left ventricular cavity size is normal. Left ventricular diastolic filling was not assessed. Left Atrium: The left atrium is normal in size. There is a normal sized left atrial appendage, the left atrial appendage Doppler velocities are normal and there is no thrombus visualized in the left atrial appendage. Right Ventricle: The right ventricle is normal in size. There is normal right ventricular global systolic function. Right Atrium: The right atrium is normal in size. Aortic Valve: There is no visualized aortic valve vegetation. There is a prosthetic aortic valve present. There is bioprosthetic aortic valve. There is no evidence of aortic valve regurgitation. Mitral Valve: There is a prosthetic mitral valve present. There is a mitral valve bioprosthesis. There is trace mitral valve regurgitation. There is a mobile echodensity at the base of the anterior leaflet suspicious for vegetation, decreased in  size in comparison to 9/2023 study. There is a small perforation of this leaflet associated but overall the valve integrity is fairly well preserved. Tricuspid Valve: The tricuspid valve is structurally normal. There is trace tricuspid regurgitation. Pulmonic Valve: The pulmonic valve was not assessed. The pulmonic valve regurgitation was not assessed. Pericardium: There is no pericardial effusion noted. Aorta: The aortic root is normal.  CONCLUSIONS:  1. Left ventricular systolic function is normal.  2. There is a mobile echodensity at the base of the anterior leaflet suspicious for vegetation, decreased in size in comparison to 9/2023 study. There is a small perforation of this leaflet associated but overall the valve integrity is fairly well preserved.  3. No aortic valve vegetation visualized.  4. There is a bioprosthetic aortic valve. QUANTITATIVE DATA SUMMARY:  [id number] Skaggs DO Electronically signed on 1/12/2024 at 1:32:47 PM  ** Final **     Transthoracic Echo (TTE) Complete    Result Date: 1/11/2024           [place] [address]            Phone [phone] TRANSTHORACIC ECHOCARDIOGRAM REPORT  Patient Name:     RITA PABLO MAVERICK Reading Physician:   [id number] Skaggs DO Study Date:       1/10/2024            Ordering Provider:   [id number] MASSIEL NARANJO MRN/PID:          [id number]             Fellow: Accession#:       [id number]         Nurse: Date of           1975 / 48      Sonographer:         Lety Ivy RDCS Birth/Age:        years Gender:           F                    Additional Staff: Height:           165.10 cm            Admit Date: Weight:           63.96 kg             Admission Status:    Inpatient - Routine BSA:              1.71 m2              Department Location: [facility] Blood Pressure: 110 /36 mmHg Study Type:    TRANSTHORACIC ECHO (TTE) COMPLETE Diagnosis/ICD:  Presence of prosthetic heart valve-Z95.2 Indication:    bacteremia CPT Codes:     Echo Complete w Full Doppler-03254 Patient History: Pacer/Defib:       Permanent pacemaker Pertinent History: HTN. s/p MVR, s/p AVR,bacteremia, PAF,ESRD, Anemia,                    CABG,endocarditis. Study Detail: The following Echo studies were performed: 2D, M-Mode, color flow               and Doppler. Technically challenging study due to dialysis cath lt               parasternal.  PHYSICIAN INTERPRETATION: Left Ventricle: Left ventricular systolic function is normal, with an estimated ejection fraction of 60-65%. There are no regional wall motion abnormalities. The left ventricular cavity size is normal. Spectral Doppler shows a normal pattern of left ventricular diastolic filling. Left Atrium: The left atrium is normal in size. Right Ventricle: The right ventricle is normal in size. There is normal right ventricular global systolic function. Right Atrium: The right atrium is normal in size. Aortic Valve: There is no visualized aortic valve vegetation. There is a prosthetic aortic valve present. There is bioprosthetic aortic valve. There is no evidence of aortic valve regurgitation. The peak instantaneous gradient of the aortic valve is 33.2 mmHg. The mean gradient of the aortic valve is 19.0 mmHg. Mitral Valve: There is a prosthetic mitral valve present. There is a normally functioning mitral valve prosthesis. There was no mitral valve vegetation visualized. There is trace mitral valve regurgitation. Tricuspid Valve: The tricuspid valve is structurally normal. There is trace tricuspid regurgitation. Pulmonic Valve: The pulmonic valve is not well visualized. The pulmonic valve regurgitation was not well visualized. Pericardium: There is no pericardial effusion noted. Aorta: The aortic root is normal.  CONCLUSIONS:  1. Left ventricular systolic function is normal with a 60-65% estimated ejection fraction.  2. No mitral valve  vegetation visualized.  3. There is a normally functioning mitral valve prosthesis.  4. No aortic valve vegetation visualized.  5. There is a bioprosthetic aortic valve. QUANTITATIVE DATA SUMMARY: 2D MEASUREMENTS:                          Normal Ranges: IVSd:          0.85 cm   (0.6-1.1cm) LVPWd:         0.72 cm   (0.6-1.1cm) LVIDd:         3.87 cm   (3.9-5.9cm) LV Mass Index: 50.5 g/m2 LV SYSTOLIC FUNCTION BY 2D PLANIMETRY (MOD):                     Normal Ranges: EF-A4C View: 60.9 % (>=55%) EF-A2C View: 65.0 % EF-Biplane:  62.9 % LV DIASTOLIC FUNCTION:                     Normal Ranges: MV Peak E: 1.87 m/s (0.7-1.2 m/s) MV Peak A: 1.01 m/s (0.42-0.7 m/s) E/A Ratio: 1.85     (1.0-2.2) MITRAL VALVE:                       Normal Ranges: MV Vmax:    1.78 m/s  (<=1.3m/s) MV peak P.7 mmHg (<5mmHg) MV mean P.0 mmHg  (<48mmHg) MV DT:      320 msec  (150-240msec) AORTIC VALVE:                                    Normal Ranges: AoV Vmax:                2.88 m/s  (<=1.7m/s) AoV Peak P.2 mmHg (<20mmHg) AoV Mean P.0 mmHg (1.7-11.5mmHg) LVOT Max Jamarcus:            1.12 m/s  (<=1.1m/s) AoV VTI:                 62.50 cm  (18-25cm) LVOT VTI:                21.00 cm LVOT Diameter:           2.00 cm   (1.8-2.4cm) AoV Area, VTI:           1.06 cm2  (2.5-5.5cm2) AoV Area,Vmax:           1.22 cm2  (2.5-4.5cm2) AoV Dimensionless Index: 0.34 TRICUSPID VALVE/RVSP:                             Normal Ranges: Peak TR Velocity: 2.84 m/s RV Syst Pressure: 35.3 mmHg (< 30mmHg) IVC Diam:         1.28 cm  [id number] Fabian Zellers DO Electronically signed on 2024 at 11:38:48 AM  ** Final **    === 24 ===    XR CHEST 1 VIEW    - Impression -  1.  Developing right basal infiltrate. See discussion above. Possible  developing retrocardiac left basal infiltrate.        MACRO:  None    Signed by: Joseph Schoenberger 2024 3:59 PM  Dictation workstation:   [id number]  === 24 ===    CT ABDOMEN PELVIS  WO IV CONTRAST    - Impression -  1.  Large amount of formed stool throughout the colon without wall  thickening or inflammatory change suggestive of constipation.  Clinical correlation recommended.  2. Nonspecific gallbladder distention without radiopaque calculi.  Unchanged mild splenomegaly. Correlation with liver enzymes is  recommended.  3. Unchanged 4.4 x 3.9 cm right ovarian cyst. Follow-up outpatient  ultrasound recommended.      Signed by: Harinder De La Rosa 12/2/2024 8:03 PM  Dictation workstation:   [id number]  === 12/02/24 ===    XR CHEST 1 VIEW    - Impression -  1.  Developing right basal infiltrate. See discussion above. Possible  developing retrocardiac left basal infiltrate.        MACRO:  None    Signed by: Joseph Schoenberger 12/2/2024 3:59 PM  Dictation workstation:   [id number]      Additional results of the last 24 hours have been reviewed.    Dictated using Truver[employer] Version 2.4  Proof read however unrecognized voice recognition errors may have occurred     Electronically signed by Blayne East DO on 12/05/24 at 3:37 PM

## 2024-12-05 NOTE — CONSULTS
Inpatient consult to cardiology  Consult performed by: Danny Chiang MD  Consult ordered by: Yasir Cox, APRN-CNP  Reason for consult: Arrhythmias        History Of Present Illness:    Batsheva Page is a 49 y.o. 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 Patient was admitted for sepsis. BC positive for MRSA. EKG shows NSR with PACs. TTE with LVEF 65-70% and normal aortic and mitral valve prosthesis structure and function.  LHC 9/2022 showed mild nonobstructive CAD.     EP was consulted for episodes of SVT/NSVT. She was started on amiodarone drip. This was later discontinued and metoprolol tartrate 50 mg po BID was started. Potassium and magnesium at normal levels. After reviewing her telemetry strips of the episodes, I observed paroxysmal episodes of SVT at rates of about 200 bpm and at times conducting with aberrancy. The patient reported palpitations correlating with the episodes. She denies associated lightheadedness, near syncope, syncope or CP. She states that these symptoms are not new and always happen during and/or after her dialysis sessions. Her baseline ECG (12/02/2024) is normal.    Last Recorded Vitals:  Vitals:    12/05/24 0618 12/05/24 0749 12/05/24 1202 12/05/24 1544   BP: (!) 168/98 (!) 175/95 (!) 177/102 (!) 181/122   BP Location: Right leg Right arm Right leg Right leg   Patient Position: Lying Lying Lying Lying   Pulse:   73 75   Resp:   16 18   Temp:   36.2 °C (97.1 °F)    TempSrc:   Temporal    SpO2:   97% 94%   Weight:       Height:           Last  Labs:  CBC - 12/5/2024:  6:15 AM  4.0 10.1 142    33.7      CMP - 12/5/2024:  6:15 AM  7.8 8.5 18 --- 0.7   3.7 3.2 8 106      PTT - 4/28/2024:  5:07 AM  1.4   14.0 32.6     Hemoglobin A1C   Date/Time Value Ref Range Status   12/23/2022 05:12 PM 4.6 % Final     Comment:          Diagnosis of Diabetes-Adults   Non-Diabetic: < or = 5.6%   Increased risk for developing diabetes: 5.7-6.4%   Diagnostic of diabetes: > or = 6.5%  .       Monitoring of Diabetes                Age (y)     Therapeutic Goal (%)   Adults:          >18           <7.0   Pediatrics:    13-18           <7.5                   7-12           <8.0                   0- 6            7.5-8.5   American Diabetes Association. Diabetes Care 33(S1), Jan 2010.     09/05/2022 03:04 AM 4.2 % Final     Comment:          Diagnosis of Diabetes-Adults   Non-Diabetic: < or = 5.6%   Increased risk for developing diabetes: 5.7-6.4%   Diagnostic of diabetes: > or = 6.5%  .       Monitoring of Diabetes                Age (y)     Therapeutic Goal (%)   Adults:          >18           <7.0   Pediatrics:    13-18           <7.5                   7-12           <8.0                   0- 6            7.5-8.5   American Diabetes Association. Diabetes Care 33(S1), Jan 2010.       VLDL   Date/Time Value Ref Range Status   09/05/2022 03:04 AM 45 (H) 0 - 40 mg/dL Final      Last I/O:  I/O last 3 completed shifts:  In: 1120 (15.1 mL/kg) [P.O.:120; I.V.:600 (8.1 mL/kg); Other:400]  Out: 1400 (18.9 mL/kg) [Other:1400]  Weight: 74 kg     Past Cardiology Tests (Last 3 Years):      Echo:  Transthoracic Echo (TTE) Complete 12/04/2024    PHYSICIAN INTERPRETATION:  Left Ventricle: The left ventricular systolic function is normal, with a visually estimated ejection fraction of 65-70%. The left ventricular cavity size is normal. There is mildly increased septal and mildly increased posterior left ventricular wall thickness. There is left ventricular concentric remodeling. Abnormal  (paradoxical) septal motion consistent with post-operative status. Left ventricular diastolic filling cannot be determined, due to mitral valve repair/replacement.  Left Atrium: The left atrium was not well visualized.  Right Ventricle: The right ventricle is mildly enlarged. There is normal right ventricular global systolic function. RV free wall is not well visualized.  Right Atrium: The right atrium is normal in size.  Aortic Valve: There is a prosthetic aortic valve present. The aortic valve dimensionless index is 0.46. There is Inspiris bioprosthetic type aortic valve bioprosthesis with a 21 mm reported size. Echo findings are consistent with normal aortic valve prosthesis structure and function. There is no evidence of aortic valve regurgitation. The peak instantaneous gradient of the aortic valve is 33 mmHg. The mean gradient of the aortic valve is 18 mmHg. The valve is not well visualized in the short axis views. No obvious vegetation.  Mitral Valve: There is a prosthetic mitral valve present. There is a [employer]. Devonte bioprosthetic type mitral valve prosthesis with a 27 mm reported size. The peak and mean gradients are 13 mmHg and 6 mmHg respectively. The peak instantaneous gradient of the mitral valve is 13 mmHg. Echo findings are consistent with normal mitral valve prosthesis structure and function. There is trace mitral valve regurgitation. No obvious vegetation.  Tricuspid Valve: Status post tricuspid valve repair. There is trace to mild tricuspid regurgitation. The Doppler estimated RVSP is mildly elevated at 35.5 mmHg. Mean gradient is 2mmHg. No tricuspid stenosis. No obvious vegetation.  Pulmonic Valve: The pulmonic valve is not well visualized. There is physiologic pulmonic valve regurgitation.  Pericardium: There is no pericardial effusion noted.  Aorta: The aortic root is normal.  Systemic Veins: The inferior vena cava appears small in size, with IVC inspiratory collapse greater than 50%.         CONCLUSIONS:   1. Poorly visualized anatomical structures due to suboptimal image quality.   2. The left ventricular systolic function is normal, with a visually estimated ejection fraction of 65-70%.   3. Abnormal septal motion consistent with post-operative status.   4. There is normal right ventricular global systolic function.   5. Mildly enlarged right ventricle.   6. There is a [employer]. Devonte bioprosthetic type mitral valve prosthesis with a 27 mm reported size. The peak and mean gradients are 13 mmHg and 6 mmHg respectively.   7. Status post tricuspid valve repair.   8. Mildly elevated right ventricular systolic pressure.   9. There is Inspiris bioprosthetic type aortic valve bioprosthesis with a 21 mm reported size.  10. Echo findings are consistent with normal aortic valve prosthesis structure and function.  11. Echo findings are consistent with normal mitral valve prosthesis structure and function.    Ejection Fractions:  EF   Date/Time Value Ref Range Status   12/04/2024 11:00 AM 68 %    01/10/2024 02:35 PM 63           Past Medical History:  She has a past medical history of Aortic valve replaced (2022), CHF (congestive heart failure), Chronic pain, Coronary artery disease, Disease of thyroid gland, ESRD (end stage renal disease) (Multi), H/O mitral valve replacement (2013), Heart disease, History of transfusion, Hypertension, Mitral valve regurgitation, Seizures (Multi), and Stroke (Multi).    Past Surgical History:  She has a past surgical history that includes IR CVC tunneled (09/09/2022); IR CVC tunneled (12/28/2022); US guided percutaneous placement (07/14/2022); Parathyroidectomy; Coronary artery bypass graft; Aortic valve replacement (09/26/2022); Mitral valve replacement (09/26/2022); Mitral valve repair (2013); Tricuspid valvuloplasty (2013); IR CVC (01/12/2024); IR CVC (05/07/2024); and IR CVC (08/20/2024).      Social History:  She reports that she has never smoked. She has never used smokeless  tobacco. She reports that she does not drink alcohol and does not use drugs.    Family History:  Family History   Problem Relation Name Age of Onset    No Known Problems Mother      No Known Problems Father          Allergies:  Kayexalate, Metoclopramide hcl, Prochlorperazine, Zofran [ondansetron hcl], and Ondansetron    Inpatient Medications:  Scheduled medications   Medication Dose Route Frequency    acetaminophen  975 mg oral q8h    aspirin  81 mg oral Daily    atorvastatin  40 mg oral Daily    calcitriol  0.5 mcg oral Daily    ceftaroline  200 mg intravenous q12h    daptomycin  6 mg/kg (Adjusted) intravenous q48h    docusate sodium  100 mg oral BID    [START ON 12/8/2024] ergocalciferol  1,250 mcg oral Every Sunday    heparin  1,900 Units intra-catheter After Dialysis    heparin  1,900 Units intra-catheter After Dialysis    lactulose  20 g oral TID    levETIRAcetam  750 mg oral Nightly    metoprolol tartrate  50 mg oral BID    pregabalin  50 mg oral BID     PRN medications   Medication    dextromethorphan-guaifenesin    diphenhydrAMINE    diphenhydrAMINE    guaiFENesin    HYDROmorphone    hydrOXYzine HCL    oxyCODONE    Or    oxyCODONE    promethazine    Or    promethazine     Continuous Medications   Medication Dose Last Rate     Outpatient Medications:  Current Outpatient Medications   Medication Instructions    acetaminophen (Tylenol) 325 mg tablet Take 2 tablets (650 mg) by mouth every 6 hours as needed for mild pain (1 - 3).    aspirin 81 mg, oral, Daily    atorvastatin (LIPITOR) 40 mg, oral, Daily    calcitriol (ROCALTROL) 0.5 mcg, oral, Daily    cloNIDine (CATAPRES) 0.1 mg, oral, Every 8 hours scheduled    epoetin brandy-epbx (RETACRIT) 100 Units/kg, subcutaneous, 3 times weekly    ergocalciferol (Vitamin D-2) 1.25 MG (77379 UT) capsule 1 capsule, oral, Once Weekly    hydrALAZINE (APRESOLINE) 50 mg, oral, 3 times daily    levETIRAcetam (KEPPRA) 750 mg, oral, Nightly    metoprolol tartrate (LOPRESSOR) 25 mg,  oral, 2 times daily    mirtazapine (REMERON) 15 mg, oral, Nightly    oxyCODONE-acetaminophen (Percocet) 5-325 mg tablet Take 1 tablet by mouth every 6 hours as needed for moderate pain (4 - 6).    pregabalin (LYRICA) 50 mg, oral, 2 times daily    promethazine (PHENERGAN) 25 mg, oral, Daily PRN       Physical Exam  Constitutional:       Appearance: She is ill-appearing.   Cardiovascular:      Rate and Rhythm: Normal rate and regular rhythm.      Heart sounds: No murmur heard.     No friction rub. No gallop.   Pulmonary:      Effort: Pulmonary effort is normal.      Breath sounds: Normal breath sounds.   Abdominal:      Palpations: Abdomen is soft.   Musculoskeletal:      Cervical back: Neck supple.   Neurological:      Mental Status: She is alert.   Psychiatric:         Mood and Affect: Mood normal.         Behavior: Behavior normal.           Assessment/Plan   EP was consulted for episodes of SVT/NSVT. She was started on amiodarone drip. This was later discontinued and metoprolol tartrate 50 mg po BID was started. Potassium and magnesium at normal levels. After reviewing her telemetry strips of the episodes, I observed paroxysmal episodes of SVT at rates of about 200 bpm and at times conducting with aberrancy. The patient reported palpitations correlating with the episodes. She denies associated lightheadedness, near syncope, syncope or CP. She states that these symptoms are not new and always happen during and/or after her dialysis sessions.     My impression is that her episodes of SVT are likely being triggered by not only rapid changes in electrolytes and fluid levels, but mainly by the catheter in her right atrium and the direct high flow rates during dialysis. For this reason, I would suggest in her specific situation, if possible, reducing flow rates during dialysis. I agree with beta blockade strategy with metoprolol.    Peripheral IV 12/02/24 20 G Right;Anterior Forearm (Active)   Site Assessment  Clean;Dry;Intact 12/05/24 0900   Dressing Status Clean;Dry 12/05/24 0900   Number of days: 3       Peripheral IV 12/02/24 20 G 5.7 cm Left Antecubital (Active)   Site Assessment Clean;Dry;Intact 12/05/24 0900   Dressing Status Clean;Dry 12/05/24 0900   Number of days: 3       Hemodialysis Cath 08/26/24 Double lumen Right Tunneled catheter Chest (Active)   Site Assessment Clean;Dry;Intact 12/05/24 0900   Dressing Status Clean;Dry;Occlusive 12/05/24 0900   Number of days: 101       Code Status:  Full Code            Danny Tran MD

## 2024-12-05 NOTE — PROGRESS NOTES
Batsheva Page is a 49 y.o. female on day 2 of admission presenting with Sepsis (Multi).    Subjective   Interval History: no fever, she had some SVT, no new complaints        Review of Systems    Objective   Range of Vitals (last 24 hours)  Heart Rate:  [70-95]   Temp:  [36.3 °C (97.4 °F)-37.4 °C (99.3 °F)]   Resp:  [18]   BP: (115-175)/(80-98)   SpO2:  [96 %-100 %]   Daily Weight  12/04/24 : 74 kg (163 lb 3.2 oz)    Body mass index is 26.34 kg/m².    Physical Exam  Constitutional:       Appearance: Normal appearance.   HENT:      Head: Normocephalic and atraumatic.      Mouth/Throat:      Mouth: Mucous membranes are moist.      Pharynx: Oropharynx is clear.   Eyes:      Pupils: Pupils are equal, round, and reactive to light.   Cardiovascular:      Rate and Rhythm: Normal rate and regular rhythm.      Heart sounds: Normal heart sounds.   Pulmonary:      Effort: Pulmonary effort is normal.      Breath sounds: Normal breath sounds.      Comments: Rt sided line without tenderness or drainage  Abdominal:      General: Abdomen is flat. Bowel sounds are normal.      Palpations: Abdomen is soft.   Musculoskeletal:      Cervical back: Normal range of motion.   Neurological:      Mental Status: She is alert.         Antibiotics  vancomycin - 750 mg/150 mL    Relevant Results  Labs  Results from last 72 hours   Lab Units 12/05/24  0615 12/04/24  0623 12/03/24  0533 12/02/24  1448   WBC AUTO x10*3/uL 4.0* 7.5 13.3* 17.7*   HEMOGLOBIN g/dL 10.1* 10.6* 9.7* 11.1*   HEMATOCRIT % 33.7* 34.0* 33.2* 35.9*   PLATELETS AUTO x10*3/uL 142* 146* 142* 188   NEUTROS PCT AUTO % 61.5 77.8  --  92.7   LYMPHS PCT AUTO % 21.8 10.1  --  2.4   MONOS PCT AUTO % 12.9 7.7  --  3.6   EOS PCT AUTO % 3.0 3.7  --  0.0     Results from last 72 hours   Lab Units 12/05/24  0615 12/04/24 0623 12/03/24  0533   SODIUM mmol/L 135* 133* 132*   POTASSIUM mmol/L 4.2 4.7 4.5   CHLORIDE mmol/L 94* 94* 95*   CO2 mmol/L 25 18* 20*   BUN mg/dL 28* 65* 42*    CREATININE mg/dL 4.99* 9.33* 7.60*   GLUCOSE mg/dL 90 91 108*   CALCIUM mg/dL 7.8* 7.5* 7.6*   ANION GAP mmol/L 20 26* 22*   EGFR mL/min/1.73m*2 10* 5* 6*   PHOSPHORUS mg/dL 3.7 5.5*  --      Results from last 72 hours   Lab Units 12/05/24  0615 12/04/24  0623 12/02/24  1448   ALK PHOS U/L  --   --  106   BILIRUBIN TOTAL mg/dL  --   --  0.7   PROTEIN TOTAL g/dL  --   --  8.5*   ALT U/L  --   --  8   AST U/L  --   --  18   ALBUMIN g/dL 3.2* 3.1* 3.9     Estimated Creatinine Clearance: 13.9 mL/min (A) (by C-G formula based on SCr of 4.99 mg/dL (H)).  C-Reactive Protein   Date Value Ref Range Status   12/03/2024 27.39 (H) <1.00 mg/dL Final     CRP   Date Value Ref Range Status   12/25/2022 9.90 (A) mg/dL Final     Comment:     REF VALUE  < 1.00     12/23/2022 23.46 (A) mg/dL Final     Comment:     REF VALUE  < 1.00       Microbiology  Susceptibility data from last 14 days.  Collected Specimen Info Organism Clindamycin Erythromycin Oxacillin Tetracycline Trimethoprim/Sulfamethoxazole Vancomycin   12/03/24 Swab from Anterior Nares Methicillin Susceptible Staphylococcus aureus (MSSA)         12/03/24 Blood culture from Dialysis Staphylococcus aureus         12/02/24 Blood culture from Peripheral Venipuncture Staphylococcus aureus         12/02/24 Blood culture from Peripheral Venipuncture Staphylococcus aureus         12/02/24 Blood culture from Peripheral Venipuncture Staphylococcus aureus         12/02/24 Blood culture from Peripheral Venipuncture Methicillin Resistant Staphylococcus aureus (MRSA)  S  S  R  S  S  S   Imaging  Reviewed       Assessment/Plan   Sepsis / bacteremia, likely dialysis line related, she has AVR / MVR, MRSA, the repeat BC is positive, TTE without vegetations  Atelectasis  Recommendations :  Change the antibiotics to Ceftaroline / Daptomycin  Likely will need the line removed  Discussed with the medical team     I spent minutes in the professional and overall care of this patient.      Richard  MD [place]

## 2024-12-05 NOTE — CONSULTS
Inpatient consult to Cardiology  Consult performed by: ORI Clements-CNP  Consult ordered by: Rosalio Nelson MD        History Of Present Illness:    Batsheva Page is a 49 y.o. 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 In the ED were remarkable mainly for a wbc of 17.7. CXR was read as showing a developing right basal infiltrate.  She was given a dose each of vancomycin and zosyn and was transferred to [facility] for further care and management. EKG shows NSR with PACs. BC positive for MRSA. TTE with LVEF 65-70% and normal aortic and mitral valve prosthesis structure and function. Recurrent episodes of SVT, nonsustained VT throughout the night. She was started on amiodarone drip. Mag WNL. C 9/2022 showed mild nonobstructive CAD.      Review of Systems   Respiratory:  Positive for chest tightness.    Cardiovascular:  Positive for palpitations.   Genitourinary:  Positive for decreased urine volume.   Neurological:  Negative for syncope.   All other systems reviewed and are negative.      Last Recorded Vitals:  Vitals:    12/04/24 2344 12/05/24 0517 12/05/24 0618 12/05/24 0749   BP: (!) 151/97 (!) 162/97 (!) 168/98 (!) 175/95   BP Location: Right leg Right leg Right leg Right arm   Patient Position: Lying Lying Lying Lying   Pulse: 92 70     Resp: 18 18     Temp: 36.9 °C (98.4 °F) 36.5 °C (97.7 °F)     TempSrc: Temporal Temporal     SpO2: 96% 100%     Weight:       Height:           Last Labs:  CBC - 12/5/2024:  6:15 AM  4.0 10.1 142    33.7      CMP - 12/5/2024:  6:15 AM  7.8 8.5 18 --- 0.7   3.7 3.2 8 106      PTT - 4/28/2024:  5:07 AM  1.4   14.0 32.6     Hemoglobin A1C   Date/Time Value Ref Range Status   12/23/2022 05:12 PM 4.6 % Final     Comment:          Diagnosis of Diabetes-Adults   Non-Diabetic: < or = 5.6%    Increased risk for developing diabetes: 5.7-6.4%   Diagnostic of diabetes: > or = 6.5%  .       Monitoring of Diabetes                Age (y)     Therapeutic Goal (%)   Adults:          >18           <7.0   Pediatrics:    13-18           <7.5                   7-12           <8.0                   0- 6            7.5-8.5   American Diabetes Association. Diabetes Care 33(S1), Jan 2010.     09/05/2022 03:04 AM 4.2 % Final     Comment:          Diagnosis of Diabetes-Adults   Non-Diabetic: < or = 5.6%   Increased risk for developing diabetes: 5.7-6.4%   Diagnostic of diabetes: > or = 6.5%  .       Monitoring of Diabetes                Age (y)     Therapeutic Goal (%)   Adults:          >18           <7.0   Pediatrics:    13-18           <7.5                   7-12           <8.0                   0- 6            7.5-8.5   American Diabetes Association. Diabetes Care 33(S1), Jan 2010.       VLDL   Date/Time Value Ref Range Status   09/05/2022 03:04 AM 45 (H) 0 - 40 mg/dL Final      Last I/O:  I/O last 3 completed shifts:  In: 1120 (15.1 mL/kg) [P.O.:120; I.V.:600 (8.1 mL/kg); Other:400]  Out: 1400 (18.9 mL/kg) [Other:1400]  Weight: 74 kg     Past Cardiology Tests (Last 3 Years):  EKG:  ECG 12 Lead 12/02/2024 (Preliminary)      ECG 12 Lead 09/23/2024      Electrocardiogram, 12-lead PRN ACS symptoms 08/21/2024      ECG 12 lead 04/04/2024      ECG 12 lead 01/08/2024    Echo:  Transthoracic Echo (TTE) Complete 12/04/2024  [facility], [id number] [place] [address]                Tel [phone] and Fax [phone]     TRANSTHORACIC ECHOCARDIOGRAM REPORT        Patient Name:       RITA Aguiar Physician:    [id number] Lyndsey Moncada MD  Study Date:         12/4/2024           Ordering Provider:    [id number] VICENTE FRANCO  MRN/PID:            [id number]             Fellow:  Accession#:         [id number]        Nurse:  Date of Birth/Age:  1975 / 49     Sonographer:          Yun hirsch                                     RDCS  Gender assigned at  F                   Additional Staff:  Birth:  Height:             167.64 cm           Admit Date:           12/3/2024  Weight:             71.21 kg            Admission Status:     Inpatient -                                                                Routine  BSA / BMI:          1.80 m2 / 25.34     Encounter#:           [id number]                      kg/m2  Blood Pressure:     145/74 mmHg         Department Location:  [facility] Non                                                                Invasive     Study Type:    TRANSTHORACIC ECHO (TTE) COMPLETE  Diagnosis/ICD: 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  Pertinent History: Fever, CAD and ESRD,AV= Inspiris 21mm, MV= EPIC St. Devonte                     27mm.     Study Detail: The following Echo studies were performed: 2D, M-Mode, Doppler and                color flow. Patient has a pacemaker.                The patient was awake.        PHYSICIAN INTERPRETATION:  Left Ventricle: The left ventricular systolic function is normal, with a visually estimated ejection fraction of 65-70%. The left ventricular cavity size is normal. There is mildly increased septal and mildly increased posterior left ventricular wall thickness. There is left ventricular concentric remodeling. Abnormal (paradoxical) septal motion  consistent with post-operative status. Left ventricular diastolic filling cannot be determined, due to mitral valve repair/replacement.  Left Atrium: The left atrium was not well visualized.  Right Ventricle: The right ventricle is mildly enlarged. There is normal right ventricular global systolic function. RV free wall is not well visualized.  Right Atrium: The right atrium is normal in size.  Aortic Valve: There is a prosthetic aortic valve present. The aortic valve dimensionless index is 0.46. There is Inspiris bioprosthetic type aortic valve bioprosthesis with a 21 mm reported size. Echo findings are consistent with normal aortic valve prosthesis structure and function. There is no evidence of aortic valve regurgitation. The peak instantaneous gradient of the aortic valve is 33 mmHg. The mean gradient of the aortic valve is 18 mmHg. The valve is not well visualized in the short axis views. No obvious vegetation.  Mitral Valve: There is a prosthetic mitral valve present. There is a St. Devonte bioprosthetic type mitral valve prosthesis with a 27 mm reported size. The peak and mean gradients are 13 mmHg and 6 mmHg respectively. The peak instantaneous gradient of the mitral valve is 13 mmHg. Echo findings are consistent with normal mitral valve prosthesis structure and function. There is trace mitral valve regurgitation. No obvious vegetation.  Tricuspid Valve: Status post tricuspid valve repair. There is trace to mild tricuspid regurgitation. The Doppler estimated RVSP is mildly elevated at 35.5 mmHg. Mean gradient is 2mmHg. No tricuspid stenosis. No obvious vegetation.  Pulmonic Valve: The pulmonic valve is not well visualized. There is physiologic pulmonic valve regurgitation.  Pericardium: There is no pericardial effusion noted.  Aorta: The aortic root is normal.  Systemic Veins: The inferior vena cava appears small in size, with IVC inspiratory collapse greater than 50%.        CONCLUSIONS:   1. Poorly visualized  anatomical structures due to suboptimal image quality.   2. The left ventricular systolic function is normal, with a visually estimated ejection fraction of 65-70%.   3. Abnormal septal motion consistent with post-operative status.   4. There is normal right ventricular global systolic function.   5. Mildly enlarged right ventricle.   6. There is a St. Devonte bioprosthetic type mitral valve prosthesis with a 27 mm reported size. The peak and mean gradients are 13 mmHg and 6 mmHg respectively.   7. Status post tricuspid valve repair.   8. Mildly elevated right ventricular systolic pressure.   9. There is Inspiris bioprosthetic type aortic valve bioprosthesis with a 21 mm reported size.  10. Echo findings are consistent with normal aortic valve prosthesis structure and function.  11. Echo findings are consistent with normal mitral valve prosthesis structure and function.    Transesophageal Echo (LAURA) 05/01/2024         [place] [address]             Phone [phone]     TRANSESOPHAGEAL ECHOCARDIOGRAM REPORT        Patient Name:     RITA SAMY TEMPLE Reading Physician:   [id number] Skaggs DO  Study Date:       5/1/2024             Ordering Provider:   [id number] SKAGGS  MRN/PID:          [id number]             Fellow:  Accession#:       [id number]         Nurse:  Date of           1975 / 48      Sonographer:         Dionte Vasquez RDCS  Birth/Age:        years  Gender:           F                    Additional Staff:  Height:           172.00 cm            Admit Date:  Weight:           70.31 kg             Admission Status:    Inpatient - Routine  BSA / BMI:        1.83 m2 / 23.77      Department Location: [facility] ICU                    kg/m2  Blood Pressure: 153 /72 mmHg     Study Type:    TRANSESOPHAGEAL ECHO (LAURA)  Diagnosis/ICD: 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        PHYSICIAN INTERPRETATION:  LAURA Details: The LAURA probe used was B21HOD. Technically adequate omniplane transesophageal echocardiogram performed.  LAURA Medication: The patient was sedated by Anesthesia; please refer to anesthesia flow sheet for medications used.  LAURA Procedure: The probe was passed without difficulty. The following complication was encountered during the procedure: Patient tolerated the procedure well without any apparent complications.  Left Ventricle: Left ventricular systolic function is normal. There are no regional wall motion abnormalities. The left ventricular cavity size is normal. Left ventricular diastolic filling was indeterminate.  Left Atrium: The left atrium is normal in size. There is a normal sized left atrial appendage, there is no thrombus visualized in the left atrial appendage and the left atrial appendage Doppler velocities are normal.  Right Ventricle: The right ventricle is normal in size. There is normal right ventricular global systolic function.  Right Atrium: The right atrium was not well visualized.  Aortic Valve: There is a prosthetic aortic valve present. Echo findings are consistent with normal aortic valve prosthesis structure and function. There is no evidence of aortic valve regurgitation.  Mitral Valve: There is a prosthetic mitral valve present. Echo findings are consistent with normal mitral valve prosthesis structure and function. There is trace mitral valve regurgitation. Trace prosthetic mitral regurgitation. Mobile echodensity previously seen is not visualized.  Tricuspid Valve: The tricuspid valve was not well visualized. Tricuspid regurgitation was not assessed.  Pulmonic Valve: The pulmonic valve is not well visualized. The pulmonic valve regurgitation was not well visualized.  Pericardium: There is no pericardial effusion  noted.  Aorta: The aortic root is normal.        CONCLUSIONS:   1. Left ventricular systolic function is normal.      Ejection Fractions:  EF   Date/Time Value Ref Range Status   12/04/2024 11:00 AM 68 %    01/10/2024 02:35 PM 63       Cath:       [facility], Cath Lab, [address] [place] [address]     Cardiovascular Catheterization Report     Patient Name:     RITA TEMPLE Performing Physician:  [id number] Bro Wren MD  Study Date:       9/12/2022          Verifying Physician:   [id number] Bro Wren MD  MRN/PID:          [id number]           Cardiologist/Co-scrub:  Accession/Order#: [id number]          Fellow:                [id number] Reese Maxwell MD  YOB: 1975         Fellow:  Gender:           F                  Referring Physician:   ALEJANDRA PASCUAL  Admit Date:                          Referring Physician:   [id number] Robbie Powell MD  Surgeon:                             Referring Physician:   ABRAM        Study: Left Heart Catheterization        Indications:  RITA TEMPLE is a 47 year old female who presents with renal failure, hypertension and dyslipidemia. Valvular disease, with an asymptomatic chest pain assessment. Study performed as an elective cath procedure.     Appropriate Use Criteria:  Preoperative assessment before valvular surgery; AUC score = 7.     Medical History:  Stress test performed: No. CTA performed: No. Agatston accessed: No. LVEF Assessed: Yes. LVEF = 65%.     Procedure Description:  After infiltration with 2% Lidocaine, the right radial artery was cannulated with a modified Seldinger technique. Subsequently a 5 Telugu sheath was placed retrograde in the right radial artery. Selective coronary catheterization  was performed using a 5 Fr catheter(s) exchanged over a guide wire to cannulate the coronary arteries. A JR 4 tip catheter was used for right coronary injections.  Additional catheter(s) used to visualize the coronary arteries were: Tiger 4.0. Multiple injections of contrast were made into the left and right coronary arteries with angiograms recorded in multiple projections. After completion of the procedure, the arterial sheath was pulled and a TR Band Radial Compression Device was utilized to obtain patent hemostasis.     Coronary Angiography:  The coronary circulation is co-dominant.     Left Main Coronary Artery:  The left main coronary artery is a normal caliber vessel. The left main arises normally from the left coronary sinus of Valsalva and bifurcates into the LAD and circumflex coronary arteries. The left main coronary artery showed no significant disease or stenosis greater than 30%.     Left Anterior Descending Coronary Artery Distribution:  The left anterior descending coronary artery is a normal caliber vessel. The LAD arises normally from the left main coronary artery. The LAD demonstrated no significant disease or stenosis greater than 30%. The 1st diagonal branch showed no significant disease or stenosis greater than 30%. The 2nd diagonal branch demonstrated no significant disease or stenosis greater than 30%. The 3rd diagonal branch revealed no significant disease or stenosis greater than 30%.     Circumflex Coronary Artery Distribution:  The circumflex coronary artery is a normal caliber vessel. The circumflex arises normally from the left main coronary artery and terminates in the AV groove. The circumflex revealed no significant disease or stenosis greater than 30%. The 1st obtuse marginal branch showed no significant disease or stenosis greater than 30%. The 2nd obtuse marginal branch demonstrated no significant disease or stenosis greater than 30%. The 3rd obtuse marginal branch revealed no  significant disease or stenosis greater than 30%. The left posterolateral branch showed no significant disease or stenosis greater than 30%. The left posterior descending artery showed no significant disease or stenosis greater than 30%.     Ramus Intermedius:  The ramus intermedius arises normally from the left main coronary artery.     Right Coronary Artery Distribution:     The right coronary artery is a normal caliber vessel. The RCA arises normally from the right sinus of Valsalva. The RCA showed no significant disease or stenosis greater than 30%. The acute marginal branch showed no significant disease or stenosis greater than 30%.  The right posterolateral branch showed no significant disease or stenosis greater than 30%. The right posterior descending artery showed no significant disease or stenosis greater than 30%.     Complications:  No in-lab complications observed.     Cardiac Cath Transition of Care Summary:  Post Procedure Diagnosis: Endocarditis.  Findings:                 Grossly normal anatomy.  Blood Loss:               Estimated blood loss during the procedure was 5 mls.  Specimens Removed:        Number of specimen(s) removed: none.        Recommendations:  Furhter management as per primary and CT surgery teams.     ____________________________________________________________________________________  CONCLUSIONS:   1. Mild CAD in a codominant system.    Stress Test:  No results found for this or any previous visit from the past 1095 days.    Cardiac Imaging:  No results found for this or any previous visit from the past 1095 days.      Past Medical History:  She has a past medical history of Aortic valve replaced (2022), CHF (congestive heart failure), Chronic pain, Coronary artery disease, Disease of thyroid gland, ESRD (end stage renal disease) (Multi), H/O mitral valve replacement (2013), Heart disease, History of transfusion, Hypertension, Mitral valve regurgitation, Seizures (Multi), and  Stroke (Multi).    Past Surgical History:  She has a past surgical history that includes IR CVC tunneled (09/09/2022); IR CVC tunneled (12/28/2022); US guided percutaneous placement (07/14/2022); Parathyroidectomy; Coronary artery bypass graft; Aortic valve replacement (09/26/2022); Mitral valve replacement (09/26/2022); Mitral valve repair (2013); Tricuspid valvuloplasty (2013); IR CVC (01/12/2024); IR CVC (05/07/2024); and IR CVC (08/20/2024).      Social History:  She reports that she has never smoked. She has never used smokeless tobacco. She reports that she does not drink alcohol and does not use drugs.    Family History:  Family History   Problem Relation Name Age of Onset    No Known Problems Mother      No Known Problems Father          Allergies:  Kayexalate, Metoclopramide hcl, Prochlorperazine, Zofran [ondansetron hcl], and Ondansetron    Inpatient Medications:  Scheduled medications   Medication Dose Route Frequency    aspirin  81 mg oral Daily    atorvastatin  40 mg oral Daily    calcitriol  0.5 mcg oral Daily    docusate sodium  100 mg oral BID    [START ON 12/8/2024] ergocalciferol  1,250 mcg oral Every Sunday    heparin  1,900 Units intra-catheter After Dialysis    heparin  1,900 Units intra-catheter After Dialysis    lactulose  20 g oral TID    levETIRAcetam  750 mg oral Nightly    pregabalin  50 mg oral BID    vancomycin  750 mg intravenous Every Mon/Wed/Fri     PRN medications   Medication    acetaminophen    dextromethorphan-guaifenesin    diphenhydrAMINE    guaiFENesin    hydrOXYzine HCL    morphine    oxyCODONE-acetaminophen    promethazine    Or    promethazine    vancomycin     Continuous Medications   Medication Dose Last Rate    amiodarone  0.5 mg/min 0.5 mg/min (12/05/24 0845)     Outpatient Medications:  Current Outpatient Medications   Medication Instructions    acetaminophen (Tylenol) 325 mg tablet Take 2 tablets (650 mg) by mouth every 6 hours as needed for mild pain (1 - 3).     aspirin 81 mg, oral, Daily    atorvastatin (LIPITOR) 40 mg, oral, Daily    calcitriol (ROCALTROL) 0.5 mcg, oral, Daily    cloNIDine (CATAPRES) 0.1 mg, oral, Every 8 hours scheduled    epoetin brandy-epbx (RETACRIT) 100 Units/kg, subcutaneous, 3 times weekly    ergocalciferol (Vitamin D-2) 1.25 MG (59518 UT) capsule 1 capsule, oral, Once Weekly    hydrALAZINE (APRESOLINE) 50 mg, oral, 3 times daily    levETIRAcetam (KEPPRA) 750 mg, oral, Nightly    metoprolol tartrate (LOPRESSOR) 25 mg, oral, 2 times daily    mirtazapine (REMERON) 15 mg, oral, Nightly    oxyCODONE-acetaminophen (Percocet) 5-325 mg tablet Take 1 tablet by mouth every 6 hours as needed for moderate pain (4 - 6).    pregabalin (LYRICA) 50 mg, oral, 2 times daily    promethazine (PHENERGAN) 25 mg, oral, Daily PRN     Physical Exam  Vitals reviewed.   Constitutional:       Appearance: Normal appearance. She is normal weight. She is ill-appearing.   HENT:      Head: Normocephalic and atraumatic.   Neck:      Vascular: No carotid bruit or JVD.   Cardiovascular:      Rate and Rhythm: Normal rate and regular rhythm.      Pulses: Normal pulses.      Heart sounds: Murmur heard.      Systolic murmur is present with a grade of 3/6.   Pulmonary:      Effort: Pulmonary effort is normal.      Breath sounds: Normal breath sounds.   Chest:      Chest wall: No tenderness.      Comments: Right sided tunneled dialysis catheter  Abdominal:      General: Abdomen is flat. Bowel sounds are normal.      Palpations: Abdomen is soft.   Skin:     General: Skin is warm and dry.   Neurological:      General: No focal deficit present.      Mental Status: She is alert and oriented to person, place, and time. Mental status is at baseline.   Psychiatric:         Mood and Affect: Mood normal.         Behavior: Behavior normal.       Assessment/Plan   Assessment  49 y.o. 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 EKG shows NSR with PACs. BC positive for MRSA. TTE with LVEF 65-70% and normal aortic and mitral valve prosthesis structure and function. Recurrent episodes of SVT, nonsustained VT throughout the night. She was started on amiodarone drip. Mag WNL. LHC 9/2022 showed mild nonobstructive CAD.     Recurrent MRSA bacteremia - likely related to tunneled dialysis catheter per ID   Bioprosthetic AVR and MVR  Tricuspid valve ring repair  ESRD on hemodialysis  HTN  Paroxysmal SVT  Nonsustained VT    Plan  -TTE (12/4/24) without endocarditis  -NPO after midnight for LAURA tomorrow  -Stop IV amiodarone, begin metoprolol tartrate 50 mg po BID  -EP consult  -Cardiology will follow     Peripheral IV 12/02/24 20 G Right;Anterior Forearm (Active)   Site Assessment Clean;Dry;Intact 12/04/24 2007   Dressing Type Transparent 12/04/24 2007   Line Status Flushed 12/04/24 2007   Dressing Status Clean;Dry;Occlusive 12/04/24 2007   Number of days: 3       Peripheral IV 12/02/24 20 G 5.7 cm Left Antecubital (Active)   Site Assessment Dry;Intact;Clean 12/04/24 2007   Dressing Type Transparent 12/04/24 2007   Line Status Flushed 12/04/24 2007   Dressing Status Clean;Dry;Occlusive 12/04/24 2007   Number of days: 3       Hemodialysis Cath 08/26/24 Double lumen Right Tunneled catheter Chest (Active)   Line Necessity Hemodialysis 12/04/24 2008   Site Assessment Clean;Dry;Intact 12/04/24 2008   Dressing Type Antimicrobial patch 12/04/24 2008   Dressing Status Clean;Dry;Occlusive 12/04/24 2008   Number of days: 101       Code Status:  Full Code    I spent minutes in the professional and overall care of this patient.        Yasir Cox, APRN-CNP

## 2024-12-06 ENCOUNTER — APPOINTMENT (OUTPATIENT)
Dept: CARDIOLOGY | Facility: HOSPITAL | Age: 49
End: 2024-12-06
Payer: MEDICARE

## 2024-12-06 ENCOUNTER — APPOINTMENT (OUTPATIENT)
Dept: RADIOLOGY | Facility: HOSPITAL | Age: 49
End: 2024-12-06
Payer: MEDICARE

## 2024-12-06 LAB
ALBUMIN SERPL BCP-MCNC: 3.5 G/DL (ref 3.4–5)
ANION GAP SERPL CALC-SCNC: 18 MMOL/L (ref 10–20)
BASOPHILS # BLD AUTO: 0.03 X10*3/UL (ref 0–0.1)
BASOPHILS NFR BLD AUTO: 0.7 %
BUN SERPL-MCNC: 15 MG/DL (ref 6–23)
CALCIUM SERPL-MCNC: 8.4 MG/DL (ref 8.6–10.3)
CHLORIDE SERPL-SCNC: 95 MMOL/L (ref 98–107)
CO2 SERPL-SCNC: 24 MMOL/L (ref 21–32)
CREAT SERPL-MCNC: 3.56 MG/DL (ref 0.5–1.05)
EGFRCR SERPLBLD CKD-EPI 2021: 15 ML/MIN/1.73M*2
EJECTION FRACTION: 68 %
EOSINOPHIL # BLD AUTO: 0.21 X10*3/UL (ref 0–0.7)
EOSINOPHIL NFR BLD AUTO: 4.9 %
ERYTHROCYTE [DISTWIDTH] IN BLOOD BY AUTOMATED COUNT: 17 % (ref 11.5–14.5)
GLUCOSE SERPL-MCNC: 82 MG/DL (ref 74–99)
HCT VFR BLD AUTO: 39.9 % (ref 36–46)
HGB BLD-MCNC: 11.5 G/DL (ref 12–16)
IMM GRANULOCYTES # BLD AUTO: 0.02 X10*3/UL (ref 0–0.7)
IMM GRANULOCYTES NFR BLD AUTO: 0.5 % (ref 0–0.9)
LYMPHOCYTES # BLD AUTO: 0.76 X10*3/UL (ref 1.2–4.8)
LYMPHOCYTES NFR BLD AUTO: 17.6 %
MAGNESIUM SERPL-MCNC: 2.06 MG/DL (ref 1.6–2.4)
MCH RBC QN AUTO: 27.3 PG (ref 26–34)
MCHC RBC AUTO-ENTMCNC: 28.8 G/DL (ref 32–36)
MCV RBC AUTO: 95 FL (ref 80–100)
MONOCYTES # BLD AUTO: 0.44 X10*3/UL (ref 0.1–1)
MONOCYTES NFR BLD AUTO: 10.2 %
NEUTROPHILS # BLD AUTO: 2.86 X10*3/UL (ref 1.2–7.7)
NEUTROPHILS NFR BLD AUTO: 66.1 %
NRBC BLD-RTO: 0 /100 WBCS (ref 0–0)
PHOSPHATE SERPL-MCNC: 2.9 MG/DL (ref 2.5–4.9)
PLATELET # BLD AUTO: 163 X10*3/UL (ref 150–450)
POTASSIUM SERPL-SCNC: 4 MMOL/L (ref 3.5–5.3)
RBC # BLD AUTO: 4.22 X10*6/UL (ref 4–5.2)
RIGHT VENTRICLE PEAK SYSTOLIC PRESSURE: 19 MMHG
SODIUM SERPL-SCNC: 133 MMOL/L (ref 136–145)
WBC # BLD AUTO: 4.3 X10*3/UL (ref 4.4–11.3)

## 2024-12-06 PROCEDURE — 99152 MOD SED SAME PHYS/QHP 5/>YRS: CPT

## 2024-12-06 PROCEDURE — 2500000001 HC RX 250 WO HCPCS SELF ADMINISTERED DRUGS (ALT 637 FOR MEDICARE OP): Performed by: INTERNAL MEDICINE

## 2024-12-06 PROCEDURE — 80069 RENAL FUNCTION PANEL: CPT | Performed by: INTERNAL MEDICINE

## 2024-12-06 PROCEDURE — 2500000004 HC RX 250 GENERAL PHARMACY W/ HCPCS (ALT 636 FOR OP/ED): Performed by: STUDENT IN AN ORGANIZED HEALTH CARE EDUCATION/TRAINING PROGRAM

## 2024-12-06 PROCEDURE — 99152 MOD SED SAME PHYS/QHP 5/>YRS: CPT | Performed by: STUDENT IN AN ORGANIZED HEALTH CARE EDUCATION/TRAINING PROGRAM

## 2024-12-06 PROCEDURE — 83735 ASSAY OF MAGNESIUM: CPT | Performed by: INTERNAL MEDICINE

## 2024-12-06 PROCEDURE — 99221 1ST HOSP IP/OBS SF/LOW 40: CPT | Performed by: SURGERY

## 2024-12-06 PROCEDURE — 2500000005 HC RX 250 GENERAL PHARMACY W/O HCPCS: Performed by: STUDENT IN AN ORGANIZED HEALTH CARE EDUCATION/TRAINING PROGRAM

## 2024-12-06 PROCEDURE — 99153 MOD SED SAME PHYS/QHP EA: CPT | Performed by: STUDENT IN AN ORGANIZED HEALTH CARE EDUCATION/TRAINING PROGRAM

## 2024-12-06 PROCEDURE — 93320 DOPPLER ECHO COMPLETE: CPT

## 2024-12-06 PROCEDURE — 99232 SBSQ HOSP IP/OBS MODERATE 35: CPT | Performed by: INTERNAL MEDICINE

## 2024-12-06 PROCEDURE — 2060000001 HC INTERMEDIATE ICU ROOM DAILY

## 2024-12-06 PROCEDURE — 36415 COLL VENOUS BLD VENIPUNCTURE: CPT | Performed by: INTERNAL MEDICINE

## 2024-12-06 PROCEDURE — 71045 X-RAY EXAM CHEST 1 VIEW: CPT

## 2024-12-06 PROCEDURE — 84100 ASSAY OF PHOSPHORUS: CPT | Performed by: INTERNAL MEDICINE

## 2024-12-06 PROCEDURE — 93312 ECHO TRANSESOPHAGEAL: CPT | Performed by: STUDENT IN AN ORGANIZED HEALTH CARE EDUCATION/TRAINING PROGRAM

## 2024-12-06 PROCEDURE — 2500000004 HC RX 250 GENERAL PHARMACY W/ HCPCS (ALT 636 FOR OP/ED): Performed by: INTERNAL MEDICINE

## 2024-12-06 PROCEDURE — 2500000004 HC RX 250 GENERAL PHARMACY W/ HCPCS (ALT 636 FOR OP/ED)

## 2024-12-06 PROCEDURE — 2500000001 HC RX 250 WO HCPCS SELF ADMINISTERED DRUGS (ALT 637 FOR MEDICARE OP): Performed by: NURSE PRACTITIONER

## 2024-12-06 PROCEDURE — 71045 X-RAY EXAM CHEST 1 VIEW: CPT | Performed by: RADIOLOGY

## 2024-12-06 PROCEDURE — 7100000009 HC PHASE TWO TIME - INITIAL BASE CHARGE

## 2024-12-06 PROCEDURE — 93325 DOPPLER ECHO COLOR FLOW MAPG: CPT | Performed by: STUDENT IN AN ORGANIZED HEALTH CARE EDUCATION/TRAINING PROGRAM

## 2024-12-06 PROCEDURE — 99232 SBSQ HOSP IP/OBS MODERATE 35: CPT | Performed by: STUDENT IN AN ORGANIZED HEALTH CARE EDUCATION/TRAINING PROGRAM

## 2024-12-06 PROCEDURE — 2500000004 HC RX 250 GENERAL PHARMACY W/ HCPCS (ALT 636 FOR OP/ED): Mod: JZ | Performed by: INTERNAL MEDICINE

## 2024-12-06 PROCEDURE — B24BZZ4 ULTRASONOGRAPHY OF HEART WITH AORTA, TRANSESOPHAGEAL: ICD-10-PCS | Performed by: STUDENT IN AN ORGANIZED HEALTH CARE EDUCATION/TRAINING PROGRAM

## 2024-12-06 PROCEDURE — 7100000010 HC PHASE TWO TIME - EACH INCREMENTAL 1 MINUTE

## 2024-12-06 PROCEDURE — 85025 COMPLETE CBC W/AUTO DIFF WBC: CPT | Performed by: INTERNAL MEDICINE

## 2024-12-06 PROCEDURE — 93319 3D ECHO IMG CGEN CAR ANOMAL: CPT

## 2024-12-06 PROCEDURE — 93320 DOPPLER ECHO COMPLETE: CPT | Performed by: STUDENT IN AN ORGANIZED HEALTH CARE EDUCATION/TRAINING PROGRAM

## 2024-12-06 PROCEDURE — 99153 MOD SED SAME PHYS/QHP EA: CPT

## 2024-12-06 RX ORDER — MIDAZOLAM HYDROCHLORIDE 1 MG/ML
1 INJECTION, SOLUTION INTRAMUSCULAR; INTRAVENOUS ONCE
Status: DISCONTINUED | OUTPATIENT
Start: 2024-12-06 | End: 2024-12-06

## 2024-12-06 RX ORDER — MIDAZOLAM HYDROCHLORIDE 5 MG/ML
INJECTION INTRAMUSCULAR; INTRAVENOUS
Status: COMPLETED
Start: 2024-12-06 | End: 2024-12-06

## 2024-12-06 RX ORDER — MIDAZOLAM HYDROCHLORIDE 5 MG/ML
5 INJECTION INTRAMUSCULAR; INTRAVENOUS ONCE
Status: COMPLETED | OUTPATIENT
Start: 2024-12-06 | End: 2024-12-06

## 2024-12-06 RX ORDER — MIDAZOLAM HYDROCHLORIDE 1 MG/ML
INJECTION, SOLUTION INTRAMUSCULAR; INTRAVENOUS AS NEEDED
Status: DISCONTINUED | OUTPATIENT
Start: 2024-12-06 | End: 2024-12-06 | Stop reason: HOSPADM

## 2024-12-06 RX ORDER — DIPHENHYDRAMINE HYDROCHLORIDE 50 MG/ML
12.5 INJECTION INTRAMUSCULAR; INTRAVENOUS ONCE
Status: DISPENSED | OUTPATIENT
Start: 2024-12-06

## 2024-12-06 RX ORDER — LIDOCAINE HYDROCHLORIDE 20 MG/ML
SOLUTION OROPHARYNGEAL AS NEEDED
Status: DISCONTINUED | OUTPATIENT
Start: 2024-12-06 | End: 2024-12-06 | Stop reason: HOSPADM

## 2024-12-06 RX ORDER — FENTANYL CITRATE 50 UG/ML
INJECTION, SOLUTION INTRAMUSCULAR; INTRAVENOUS AS NEEDED
Status: DISCONTINUED | OUTPATIENT
Start: 2024-12-06 | End: 2024-12-06 | Stop reason: HOSPADM

## 2024-12-06 RX ORDER — FENTANYL CITRATE 50 UG/ML
50 INJECTION, SOLUTION INTRAMUSCULAR; INTRAVENOUS ONCE
Status: DISCONTINUED | OUTPATIENT
Start: 2024-12-06 | End: 2024-12-06

## 2024-12-06 RX ORDER — DIPHENHYDRAMINE HYDROCHLORIDE 50 MG/ML
12.5 INJECTION INTRAMUSCULAR; INTRAVENOUS EVERY 12 HOURS PRN
Status: DISPENSED | OUTPATIENT
Start: 2024-12-06

## 2024-12-06 ASSESSMENT — PAIN SCALES - GENERAL
PAINLEVEL_OUTOF10: 10 - WORST POSSIBLE PAIN
PAINLEVEL_OUTOF10: 10 - WORST POSSIBLE PAIN
PAINLEVEL_OUTOF10: 0 - NO PAIN
PAINLEVEL_OUTOF10: 10 - WORST POSSIBLE PAIN
PAINLEVEL_OUTOF10: 10 - WORST POSSIBLE PAIN
PAINLEVEL_OUTOF10: 0 - NO PAIN
PAINLEVEL_OUTOF10: 10 - WORST POSSIBLE PAIN
PAINLEVEL_OUTOF10: 0 - NO PAIN

## 2024-12-06 ASSESSMENT — PAIN - FUNCTIONAL ASSESSMENT
PAIN_FUNCTIONAL_ASSESSMENT: 0-10

## 2024-12-06 ASSESSMENT — COGNITIVE AND FUNCTIONAL STATUS - GENERAL
CLIMB 3 TO 5 STEPS WITH RAILING: A LITTLE
DAILY ACTIVITIY SCORE: 24
DAILY ACTIVITIY SCORE: 24
MOBILITY SCORE: 23
CLIMB 3 TO 5 STEPS WITH RAILING: A LITTLE
MOBILITY SCORE: 23

## 2024-12-06 ASSESSMENT — PAIN DESCRIPTION - LOCATION
LOCATION: GENERALIZED
LOCATION: GENERALIZED

## 2024-12-06 ASSESSMENT — PAIN SCALES - PAIN ASSESSMENT IN ADVANCED DEMENTIA (PAINAD): TOTALSCORE: MEDICATION (SEE MAR)

## 2024-12-06 NOTE — H&P
"History Of Present Illness:    Batsheva Page is a 49 y.o. female presenting for ramon   No dysphagia or bleeding       Last Recorded Vitals:  Vitals:    12/05/24 1544 12/05/24 2017 12/05/24 2341 12/06/24 0630   BP: (!) 181/122   127/64   BP Location: Right leg   Right leg   Patient Position: Lying   Lying   Pulse: 75 79 76 64   Resp: 18   18   Temp:  36.5 °C (97.7 °F) 36.2 °C (97.2 °F) 36.6 °C (97.9 °F)   TempSrc:  Temporal Temporal Temporal   SpO2: 94%   100%   Weight:    71.9 kg (158 lb 9.6 oz)   Height:  1.676 m (5' 5.98\")         Last Labs:  CBC - 12/6/2024:  5:58 AM  4.3 11.5 163    39.9      CMP - 12/6/2024:  5:58 AM  8.4 8.5 18 --- 0.7   2.9 3.5 8 106      PTT - 4/28/2024:  5:07 AM  1.4   14.0 32.6     Hemoglobin A1C   Date/Time Value Ref Range Status   12/23/2022 05:12 PM 4.6 % Final     Comment:          Diagnosis of Diabetes-Adults   Non-Diabetic: < or = 5.6%   Increased risk for developing diabetes: 5.7-6.4%   Diagnostic of diabetes: > or = 6.5%  .       Monitoring of Diabetes                Age (y)     Therapeutic Goal (%)   Adults:          >18           <7.0   Pediatrics:    13-18           <7.5                   7-12           <8.0                   0- 6            7.5-8.5   American Diabetes Association. Diabetes Care 33(S1), Jan 2010.     09/05/2022 03:04 AM 4.2 % Final     Comment:          Diagnosis of Diabetes-Adults   Non-Diabetic: < or = 5.6%   Increased risk for developing diabetes: 5.7-6.4%   Diagnostic of diabetes: > or = 6.5%  .       Monitoring of Diabetes                Age (y)     Therapeutic Goal (%)   Adults:          >18           <7.0   Pediatrics:    13-18           <7.5                   7-12           <8.0                   0- 6            7.5-8.5   American Diabetes Association. Diabetes Care 33(S1), Jan 2010.       VLDL   Date/Time Value Ref Range Status   09/05/2022 03:04 AM 45 (H) 0 - 40 mg/dL Final      Last I/O:  I/O last 3 completed shifts:  In: 1440 (20 mL/kg) " [P.O.:240; I.V.:800 (11.1 mL/kg); Other:400]  Out: 2400 (33.4 mL/kg) [Other:2400]  Weight: 71.9 kg     Past Cardiology Tests (Last 3 Years):  EKG:  ECG 12 lead 12/04/2024 (Preliminary)      ECG 12 Lead 12/02/2024      ECG 12 Lead 09/23/2024      Electrocardiogram, 12-lead PRN ACS symptoms 08/21/2024      ECG 12 lead 04/04/2024      ECG 12 lead 01/08/2024    Echo:  Transthoracic Echo (TTE) Complete 12/04/2024      Transesophageal Echo (LAURA) 05/01/2024      Transthoracic Echo (TTE) Complete 04/29/2024      Transesophageal Echo (LAURA) 01/12/2024      Transthoracic Echo (TTE) Complete 01/10/2024    Ejection Fractions:  EF   Date/Time Value Ref Range Status   12/04/2024 11:00 AM 68 %    01/10/2024 02:35 PM 63       Cath:  No results found for this or any previous visit from the past 1095 days.    Stress Test:  No results found for this or any previous visit from the past 1095 days.    Cardiac Imaging:  No results found for this or any previous visit from the past 1095 days.      Past Medical History:  She has a past medical history of Aortic valve replaced (2022), CHF (congestive heart failure), Chronic pain, Coronary artery disease, Disease of thyroid gland, ESRD (end stage renal disease) (Multi), H/O mitral valve replacement (2013), Heart disease, History of transfusion, Hypertension, Mitral valve regurgitation, Seizures (Multi), and Stroke (Multi).    Past Surgical History:  She has a past surgical history that includes IR CVC tunneled (09/09/2022); IR CVC tunneled (12/28/2022); US guided percutaneous placement (07/14/2022); Parathyroidectomy; Coronary artery bypass graft; Aortic valve replacement (09/26/2022); Mitral valve replacement (09/26/2022); Mitral valve repair (2013); Tricuspid valvuloplasty (2013); IR CVC (01/12/2024); IR CVC (05/07/2024); and IR CVC (08/20/2024).      Social History:  She reports that she has never smoked. She has never used smokeless tobacco. She reports that she does not drink alcohol and  does not use drugs.    Family History:  Family History   Problem Relation Name Age of Onset    No Known Problems Mother      No Known Problems Father          Allergies:  Kayexalate, Metoclopramide hcl, Prochlorperazine, Zofran [ondansetron hcl], and Ondansetron    Inpatient Medications:  Scheduled medications   Medication Dose Route Frequency    acetaminophen  975 mg oral q8h    aspirin  81 mg oral Daily    atorvastatin  40 mg oral Daily    calcitriol  0.5 mcg oral Daily    ceftaroline  200 mg intravenous q12h    daptomycin  6 mg/kg (Adjusted) intravenous q48h    docusate sodium  100 mg oral BID    [START ON 12/8/2024] ergocalciferol  1,250 mcg oral Every Sunday    heparin  1,900 Units intra-catheter After Dialysis    heparin  1,900 Units intra-catheter After Dialysis    lactulose  20 g oral TID    levETIRAcetam  750 mg oral Nightly    metoprolol tartrate  50 mg oral BID    pregabalin  50 mg oral BID     PRN medications   Medication    dextromethorphan-guaifenesin    diphenhydrAMINE    diphenhydrAMINE    diphenhydrAMINE    guaiFENesin    HYDROmorphone    hydrOXYzine HCL    oxyCODONE    Or    oxyCODONE    promethazine    Or    promethazine     Continuous Medications   Medication Dose Last Rate     Outpatient Medications:  Current Outpatient Medications   Medication Instructions    acetaminophen (Tylenol) 325 mg tablet Take 2 tablets (650 mg) by mouth every 6 hours as needed for mild pain (1 - 3).    aspirin 81 mg, oral, Daily    atorvastatin (LIPITOR) 40 mg, oral, Daily    calcitriol (ROCALTROL) 0.5 mcg, oral, Daily    cloNIDine (CATAPRES) 0.1 mg, oral, Every 8 hours scheduled    epoetin brandy-epbx (RETACRIT) 100 Units/kg, subcutaneous, 3 times weekly    ergocalciferol (Vitamin D-2) 1.25 MG (20816 UT) capsule 1 capsule, oral, Once Weekly    hydrALAZINE (APRESOLINE) 50 mg, oral, 3 times daily    levETIRAcetam (KEPPRA) 750 mg, oral, Nightly    metoprolol tartrate (LOPRESSOR) 25 mg, oral, 2 times daily    mirtazapine  (REMERON) 15 mg, oral, Nightly    oxyCODONE-acetaminophen (Percocet) 5-325 mg tablet Take 1 tablet by mouth every 6 hours as needed for moderate pain (4 - 6).    pregabalin (LYRICA) 50 mg, oral, 2 times daily    promethazine (PHENERGAN) 25 mg, oral, Daily PRN       Physical Exam:  +ve murmur     Assessment/Plan   Proceed with ramon r/o endocarditis  Peripheral IV 12/02/24 20 G 5.7 cm Left Antecubital (Active)   Site Assessment Clean;Dry;Intact 12/05/24 2027   Dressing Type Transparent 12/05/24 2027   Line Status Flushed 12/05/24 2027   Dressing Status Clean;Dry;Occlusive 12/05/24 2027   Number of days: 4       Hemodialysis Cath 08/26/24 Double lumen Right Tunneled catheter Chest (Active)   Line Necessity Hemodialysis 12/05/24 2341   Site Assessment Clean;Dry;Intact 12/05/24 2341   Proximal Lumen Status Blood return noted;Flushed;Heparin locked;Capped 12/05/24 2341   Distal Lumen Status Blood return noted;Flushed;Heparin locked;Capped 12/05/24 2341   Line Care Connections checked and tightened;Cap changed 12/05/24 2341   Cap Change Cap changed 12/05/24 2341   Dressing Type Antimicrobial patch 12/05/24 2341   Dressing Status Clean;Occlusive;Dry 12/05/24 2341   Dressing Change Due 12/11/24 12/05/24 2341   Number of days: 102       Code Status:  Full Code    I spent  minutes in the professional and overall care of this patient.        Lyndsey Moncada MD

## 2024-12-06 NOTE — CONSULTS
General/Trauma Surgery History and Physical      History Of Present Illness  Batsheva Page is a 49 y.o. female with history of ESRD with multiple episodes of sepsis requiring HD line placements and removals who presented on 12/3/24 for fevers, chills and altered mental status. She reported confusion, difficulty remembering things and a Tmax of 102 for 3-4 days prior to arrival. She had a culture pulled from her HD line prior to presentation and it grew gram positive cocci in clusters. She went to [facility] who then transferred here as there were no bed availability. Echocardiogram done without evidence of endocarditis. Repeated blood cultures again with positive MSSA x2. Surgery is now consulted for a line holiday. Patient has had numerous lines placed and refuses bedside removal due to pain from the last removal.       Past Medical History  ESRD on dialysis MWF  CHF  CAD  Thyroid disease  Hypertension  Aortic and mitral valve bioprosthesis   Endocarditis   MRSA bacteremia history     Surgical History  Aortic and mitral valve bioprosthesis  Tricuspid valvuloplasty  Parathyroidectomy  Multiple tunneled CVC placements and removals        Social History   reports that she has never smoked. She has never used smokeless tobacco. She reports that she does not drink alcohol and does not use drugs. Lives at home.    Family History  family history includes No Known Problems in her father and mother.     Allergies  Kayexalate, Metoclopramide hcl, Prochlorperazine, Zofran [ondansetron hcl], and Ondansetron    Meds  Current Outpatient Medications   Medication Instructions    acetaminophen (Tylenol) 325 mg tablet Take 2 tablets (650 mg) by mouth every 6 hours as needed for mild pain (1 - 3).    aspirin 81 mg, oral, Daily    atorvastatin (LIPITOR) 40 mg, oral, Daily    calcitriol (ROCALTROL) 0.5 mcg, oral, Daily    cloNIDine (CATAPRES) 0.1 mg, oral, Every 8 hours scheduled    epoetin brandy-epbx (RETACRIT) 100 Units/kg,  "subcutaneous, 3 times weekly    ergocalciferol (Vitamin D-2) 1.25 MG (97160 UT) capsule 1 capsule, oral, Once Weekly    hydrALAZINE (APRESOLINE) 50 mg, oral, 3 times daily    levETIRAcetam (KEPPRA) 750 mg, oral, Nightly    metoprolol tartrate (LOPRESSOR) 25 mg, oral, 2 times daily    mirtazapine (REMERON) 15 mg, oral, Nightly    oxyCODONE-acetaminophen (Percocet) 5-325 mg tablet Take 1 tablet by mouth every 6 hours as needed for moderate pain (4 - 6).    pregabalin (LYRICA) 50 mg, oral, 2 times daily    promethazine (PHENERGAN) 25 mg, oral, Daily PRN        Review of Systems   A complete 10 point review of systems was performed and is negative except as noted in the history of present illness.     Last Recorded Vitals  Blood pressure 140/70, pulse 69, temperature 36.7 °C (98.1 °F), temperature source Temporal, resp. rate 15, height 1.676 m (5' 5.98\"), weight 71.9 kg (158 lb 9.6 oz), SpO2 94%.    0-10 (Numeric) Pain Score: 0 - No pain     Physical Exam  Constitutional: No acute distress. Sitting up in bed.  Neuro: Alert, oriented. Follows commands.   Eyes: Extraocular motions intact. No scleral icterus. Conjunctiva pink.   EENT: Mucous membranes moist. Normal dentition. Hears normal speaking voice.  Neck: Supple. No masses. Right internal jugular tunneled CVC with no erythema or drainage.  Cardiovascular: Regular rate. Palpable pulses bilaterally. No pitting edema.   Respiratory: No increased work of breathing or audible wheeze. Equal expansion.  Abdomen: Soft, non obese abdomen.   MSK: Moves all extremities. No atrophy.  Lymphatic: No palpable lymph nodes. No lymphedema.   Skin: Warm, dry, intact. No rashes or lesions.   Psychological: Appropriate mood and behavior.           Relevant Results  Laboratory Results:  CBC:   Lab Results   Component Value Date    WBC 4.3 (L) 12/06/2024    RBC 4.22 12/06/2024    HGB 11.5 (L) 12/06/2024    HCT 39.9 12/06/2024     12/06/2024       RFP:   Lab Results   Component Value " Date     (L) 12/06/2024    K 4.0 12/06/2024    CL 95 (L) 12/06/2024    CO2 24 12/06/2024    BUN 15 12/06/2024    CREATININE 3.56 (H) 12/06/2024    CALCIUM 8.4 (L) 12/06/2024    MG 2.06 12/06/2024    PHOS 2.9 12/06/2024        LFTs:   Lab Results   Component Value Date    ALBUMIN 3.5 12/06/2024         Imaging:  XR chest 1 view 12/02/2024    Narrative  Interpreted By:  Schoenberger, Joseph,  STUDY:  XR CHEST 1 VIEW;  12/2/2024 3:37 pm    INDICATION:  Signs/Symptoms:cough.      COMPARISON:  04/27/2024    ACCESSION NUMBER(S):  [id number]    ORDERING CLINICIAN:  TOMEKA CASON    FINDINGS:  In the interval since the prior exam, the left jugular chronic  hemodialysis catheter has been removed and a right likely external  jugular chronic hemodialysis catheter is been placed. The tip is in  the upper right atrium in good position and there is no pneumothorax.        CARDIOMEDIASTINAL SILHOUETTE:  Cardiomediastinal silhouette is normal in size and configuration.    LUNGS:  There is likely a new mild infiltrate developing inferior to the  right hilum. Of note this region is partially obscured by the hubs of  the patient's dialysis catheter. The there is no boby lobar  consolidation. Additionally, in the retrocardiac medial left lung  base there may be a few air bronchograms indicating developing left  basal infiltrate.    ABDOMEN:  No remarkable upper abdominal findings.    BONES:  No acute osseous changes.    Impression  1.  Developing right basal infiltrate. See discussion above. Possible  developing retrocardiac left basal infiltrate.        MACRO:  None    Signed by: Joseph Schoenberger 12/2/2024 3:59 PM  Dictation workstation:   [id number]         Assessment/Plan   This is a 49 y.o. female with ESRD on HD admitted for bacteremia. History of multiple tunneled CVC lines, scarring. Surgery consulted for HD line removal under sedation for line holiday.    Plan:  -- Will plan to remove in OR under MAC today  -- NPO  for procedure, anticipate late afternoon  -- Plan for replacement next week with IR          Seen and discussed with Dr. Murillo who is in agreement with plan. Please secure chat with questions.    Ivett Bland PA-C

## 2024-12-06 NOTE — PROGRESS NOTES
"[facility] Hospitalist Progress Note       [id number]: Please page me for patient care issues.  [id number]: Please page night hospitalist for any issues.     Batsheva Page  :  1975(49 y.o.)  MRN:  [id number]  PCP: Zhou Pedersen MD      Principal Problem:    Sepsis (Multi)      Assessment and Plan:     Batsheva Page is a 49 y.o. 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 Patient presented to the ED due to AMS fever (102oF) and rigor. Patient's  last HD session was 24 and the dialysis center enoch blood cultures was positive for gram +ve cocci in clusters  In the ED were remarkable mainly for a wbc of 17.7. CXR was read as showing a developing right basal infiltrate.  She was given a dose each of vancomycin and zosyn and was transferred to [facility] for further care and management.     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  #ESRD on HD (MWF)   #RLL infiltrate on CXR, asymptomatic  #Chronic pruritus requiring benadryl  #Acute metabolic encephalopathy, resolved  #Overweight BMI 25-29.9, Body mass index is 25.61 kg/m².  -Vanc/zosyn->vanc->Daptomycin pending repeat Bcx  -TTE (24) w/o e/o of endocarditis.   -s/p LAURA (24) w/o e/o endocarditis but positive for HC cath vegetation  -surgery to remove HD line removal under sedation on 24 2/2 severe pain from scarring 2/2 multiple prior HD line.  -Will need line holiday pending neg repeat Bcx.   -New line to be placed by IR (12/10/24) under sedation on the week of  pending neg repeat (24) Bcx. IR line placement outside of suggested timeframe will require pt to be \"[place]\" to  [place] for line placement  -resume rest of home meds as indicated  -CS " notes reviewed and recs appreciated: ID, nephrology     Disposition: await test results, await consultant recommendations, and await clinical improvement    DVT Prophylaxis: Subq Heparin    Code status: Full Code  Diet: NPO Diet; Effective midnight    Level of MDM:  High    patient and family updated, I personally examined the patient, and I personally reviewed chart, data, labs radiology reports    Family Communication  Number :   Name of Designated Family Representative:       Total time spent: 35 minutes, of total time providing counseling or in coordination of care. Total time on this day of visit includes record and documentation review before and after visit including documentation and time not explicitly included on EMR time stamp      Subjective:   Interval History:  HPI  The patient complains of bacteremia  The patient feels their symptoms areunchanged  no events or new concerns    Scheduled Meds:acetaminophen, 975 mg, oral, q8h  aspirin, 81 mg, oral, Daily  atorvastatin, 40 mg, oral, Daily  calcitriol, 0.5 mcg, oral, Daily  ceftaroline, 200 mg, intravenous, q12h  daptomycin, 6 mg/kg (Adjusted), intravenous, q48h  docusate sodium, 100 mg, oral, BID  [START ON 12/8/2024] ergocalciferol, 1,250 mcg, oral, Every Sunday  heparin, 1,900 Units, intra-catheter, After Dialysis  heparin, 1,900 Units, intra-catheter, After Dialysis  lactulose, 20 g, oral, TID  levETIRAcetam, 750 mg, oral, Nightly  metoprolol tartrate, 50 mg, oral, BID  pregabalin, 50 mg, oral, BID      Continuous Infusions:   PRN Meds:PRN medications: dextromethorphan-guaifenesin, diphenhydrAMINE, diphenhydrAMINE, diphenhydrAMINE, guaiFENesin, HYDROmorphone, hydrOXYzine HCL, oxyCODONE **OR** oxyCODONE, promethazine **OR** promethazine    Review of Systems   All other systems reviewed and are negative.    Interval Pertinent History:  Social History     Tobacco Use    Smoking status: Never    Smokeless tobacco: Never   Substance Use Topics    Alcohol  "use: Never         Objective:   Patient Vitals for the past 24 hrs:   BP Temp Temp src Pulse Resp SpO2 Height Weight   24 0945 127/60 -- -- 71 11 96 % -- --   24 0930 120/50 -- -- 76 13 96 % -- --   24 0917 104/90 -- -- 78 18 94 % -- --   24 0906 122/53 -- -- 76 12 96 % -- --   24 0902 (!) 138/106 -- -- 79 15 99 % -- --   24 0850 100/72 -- -- 82 15 99 % -- --   24 0843 100/84 -- -- 84 16 98 % -- --   24 0630 127/64 36.6 °C (97.9 °F) Temporal 64 18 100 % -- 71.9 kg (158 lb 9.6 oz)   24 -- 36.2 °C (97.2 °F) Temporal 76 -- -- -- --   24 -- 36.5 °C (97.7 °F) Temporal 79 -- -- 1.676 m (5' 5.98\") --   24 1544 (!) 181/122 -- -- 75 18 94 % -- --   24 1202 (!) 177/102 36.2 °C (97.1 °F) Temporal 73 16 97 % -- --       Average, Min, and Max for last 24 hours Vitals:  TEMPERATURE:  Temp  Av.4 °C (97.5 °F)  Min: 36.2 °C (97.1 °F)  Max: 36.6 °C (97.9 °F)    RESPIRATIONS RANGE: Resp  Avg: 15.2  Min: 11  Max: 18    PULSE RANGE: Pulse  Av.1  Min: 64  Max: 84    BLOOD PRESSURE RANGE:  Systolic (24hrs), Av , Min:100 , Max:181   ; Diastolic (24hrs), Av, Min:50, Max:122      PULSE OXIMETRY RANGE: SpO2  Av.9 %  Min: 94 %  Max: 100 %  Body mass index is 25.61 kg/m².    I/O last 3 completed shifts:  In: 1440 (20 mL/kg) [P.O.:240; I.V.:800 (11.1 mL/kg); Other:400]  Out: 2400 (33.4 mL/kg) [Other:2400]  Weight: 71.9 kg   Weight change:      Physical Exam  Vitals and nursing note reviewed.   Constitutional:       Appearance: Normal appearance.   HENT:      Head: Normocephalic and atraumatic.      Right Ear: External ear normal.      Left Ear: External ear normal.      Nose: Nose normal.      Mouth/Throat:      Mouth: Mucous membranes are moist.   Eyes:      General: No scleral icterus.        Right eye: No discharge.         Left eye: No discharge.      Extraocular Movements: Extraocular movements intact.      Conjunctiva/sclera: " Conjunctivae normal.      Pupils: Pupils are equal, round, and reactive to light.   Cardiovascular:      Rate and Rhythm: Normal rate and regular rhythm.      Heart sounds: Murmur heard.   Pulmonary:      Effort: Pulmonary effort is normal.      Breath sounds: Normal breath sounds.   Chest:      Comments: Right anterior chest wall dialysis cath in place  Abdominal:      General: Abdomen is flat. Bowel sounds are normal.      Palpations: Abdomen is soft.   Musculoskeletal:         General: Normal range of motion.      Right lower leg: No edema.      Left lower leg: No edema.   Skin:     General: Skin is warm and dry.      Capillary Refill: Capillary refill takes less than 2 seconds.   Neurological:      General: No focal deficit present.   Psychiatric:         Mood and Affect: Mood normal.         Thought Content: Thought content normal.         Judgment: Judgment normal.         Lab Results   Component Value Date     (L) 12/06/2024    K 4.0 12/06/2024    CL 95 (L) 12/06/2024    CO2 24 12/06/2024    BUN 15 12/06/2024    CREATININE 3.56 (H) 12/06/2024    GLUCOSE 82 12/06/2024    CALCIUM 8.4 (L) 12/06/2024    PROT 8.5 (H) 12/02/2024    BILITOT 0.7 12/02/2024    ALKPHOS 106 12/02/2024    AST 18 12/02/2024    ALT 8 12/02/2024    GLOB 4.1 (H) 09/21/2023       Lab Results   Component Value Date    WBC 4.3 (L) 12/06/2024    HGB 11.5 (L) 12/06/2024    HCT 39.9 12/06/2024    MCV 95 12/06/2024     12/06/2024    LYMPHOPCT 17.6 12/06/2024    RBC 4.22 12/06/2024    MCH 27.3 12/06/2024    MCHC 28.8 (L) 12/06/2024    RDW 17.0 (H) 12/06/2024    CRP 27.39 (H) 12/03/2024       Lab Results   Component Value Date    TSH 2.12 04/28/2024     Lab Results   Component Value Date    LACTATE 1.4 12/02/2024    DDIMER 3.81 (H) 10/26/2022    INR 1.4 (H) 04/28/2024       IMAGES:  Encounter Date: 12/03/24   ECG 12 lead   Result Value    Ventricular Rate 92    Atrial Rate 92    UT Interval 144    QRS Duration 74    QT Interval 338     QTC Calculation(Bazett) 417    P Axis 51    R Axis -8    T Axis 53    QRS Count 15    Q Onset 221    T Offset 390    QTC Fredericia 389    Narrative    Sinus rhythm with Premature atrial complexes  Possible Inferior infarct , age undetermined  Cannot rule out Anterior infarct , age undetermined  Abnormal ECG  When compared with ECG of 02-DEC-2024 14:59, (unconfirmed)  Premature atrial complexes are now Present  Borderline criteria for Inferior infarct are now Present        Transthoracic Echo (TTE) Complete    Result Date: 12/4/2024   [facility], [address] [place] [address]               Tel [phone] and Fax [phone] TRANSTHORACIC ECHOCARDIOGRAM REPORT  Patient Name:       RITA PABLO          [place]me Physician:    [id number] Lyndsey Moncada MD Study Date:         12/4/2024           Ordering Provider:    [id number] VICENTE FRANCO MRN/PID:            [id number]            Fellow: Accession#:         [id number]        Nurse: Date of Birth/Age:  1975 / 49     Sonographer:          Yun hirsch RDCS Gender assigned at  F                   Additional Staff: Birth: Height:             167.64 cm           Admit Date:           12/3/2024 Weight:             71.21 kg            Admission Status:     Inpatient -                                                               Routine BSA / BMI:          1.80 m2 / 25.34     Encounter#:           [id number]                     kg/m2 Blood Pressure:     145/74 mmHg         Department Location:  [facility] Non                                                               Invasive Study Type:    TRANSTHORACIC ECHO (TTE) COMPLETE Diagnosis/ICD: 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 Pertinent History: Fever, CAD and ESRD,AV= Inspiris 21mm, MV= EPIC [place]                    27mm. Study Detail: The following Echo studies were performed: 2D, M-Mode, Doppler and               color flow. Patient has a pacemaker.               The patient was awake.  PHYSICIAN INTERPRETATION: Left Ventricle: The left ventricular systolic function is normal, with a visually estimated ejection fraction of 65-70%. The left ventricular cavity size is normal. There is mildly increased septal and mildly increased posterior left ventricular wall thickness. There is left ventricular concentric remodeling. Abnormal (paradoxical) septal motion consistent with post-operative status. Left ventricular diastolic filling cannot be determined, due to mitral valve repair/replacement. Left Atrium: The left atrium was not well visualized. Right Ventricle: The right ventricle is mildly enlarged. There is normal right ventricular global systolic function. RV free wall is not well visualized. Right Atrium: The right atrium is normal in size. Aortic Valve: There is a prosthetic aortic valve present. The aortic valve dimensionless index is 0.46. There is Inspiris bioprosthetic type aortic valve bioprosthesis with a 21 mm reported size. Echo findings are consistent with normal aortic valve prosthesis structure and function. There is no evidence of aortic valve regurgitation. The peak instantaneous gradient of the aortic valve is 33 mmHg. The mean gradient of the aortic valve is 18 mmHg. The valve is not well visualized in the short axis views. No obvious vegetation. Mitral Valve: There is a prosthetic mitral  valve present. There is a [place] Devonte bioprosthetic type mitral valve prosthesis with a 27 mm reported size. The peak and mean gradients are 13 mmHg and 6 mmHg respectively. The peak instantaneous gradient of the mitral valve is 13 mmHg. Echo findings are consistent with normal mitral valve prosthesis structure and function. There is trace mitral valve regurgitation. No obvious vegetation. Tricuspid Valve: Status post tricuspid valve repair. There is trace to mild tricuspid regurgitation. The Doppler estimated RVSP is mildly elevated at 35.5 mmHg. Mean gradient is 2mmHg. No tricuspid stenosis. No obvious vegetation. Pulmonic Valve: The pulmonic valve is not well visualized. There is physiologic pulmonic valve regurgitation. Pericardium: There is no pericardial effusion noted. Aorta: The aortic root is normal. Systemic Veins: The inferior vena cava appears small in size, with IVC inspiratory collapse greater than 50%.  CONCLUSIONS:  1. Poorly visualized anatomical structures due to suboptimal image quality.  2. The left ventricular systolic function is normal, with a visually estimated ejection fraction of 65-70%.  3. Abnormal septal motion consistent with post-operative status.  4. There is normal right ventricular global systolic function.  5. Mildly enlarged right ventricle.  6. There is a [place]. Devonte bioprosthetic type mitral valve prosthesis with a 27 mm reported size. The peak and mean gradients are 13 mmHg and 6 mmHg respectively.  7. Status post tricuspid valve repair.  8. Mildly elevated right ventricular systolic pressure.  9. There is Inspiris bioprosthetic type aortic valve bioprosthesis with a 21 mm reported size. 10. Echo findings are consistent with normal aortic valve prosthesis structure and function. 11. Echo findings are consistent with normal mitral valve prosthesis structure and function. QUANTITATIVE DATA SUMMARY:  2D MEASUREMENTS:          Normal Ranges: IVSd:            0.98 cm  (0.6-1.1cm) LVPWd:            1.02 cm  (0.6-1.1cm) LVIDd:           3.23 cm  (3.9-5.9cm) LVIDs:           2.33 cm LV Mass Index:   51 g/m2 LVEDV Index:     41 ml/m2 LV % FS          27.9 %  LA VOLUME:                    Normal Ranges: LA Vol A4C:        32.3 ml    (22+/-6mL/m2) LA Vol A2C:        25.1 ml LA Vol BP:         31.8 ml LA Vol Index A4C:  17.9 ml/m2 LA Vol Index A2C:  13.9 ml/m2 LA Vol Index BP:   17.6 ml/m2 LA Area A4C:       13.5 cm2 LA Area A2C:       10.7 cm2 LA Major Axis A4C: 4.8 cm LA Major Axis A2C: 3.8 cm LA Vol A4C:        27.3 ml LA Vol A2C:        22.0 ml LA Vol Index BSA:  13.7 ml/m2  RA VOLUME BY A/L METHOD:          Normal Ranges: RA Area A4C:             16.4 cm2  AORTA MEASUREMENTS:         Normal Ranges: Ao Sinus, d:        2.00 cm (2.1-3.5cm) Asc Ao, d:          2.60 cm (2.1-3.4cm)  LV SYSTOLIC FUNCTION BY 2D PLANIMETRY (MOD):                      Normal Ranges: EF-A4C View:    62 % (>=55%) EF-A2C View:    72 % EF-Biplane:     67 % EF-Visual:      68 % LV EF Reported: 68 %  LV DIASTOLIC FUNCTION:             Normal Ranges: MV Peak E:             1.92 m/s    (0.7-1.2 m/s) MV Peak A:             1.01 m/s    (0.42-0.7 m/s) E/A Ratio:             1.90        (1.0-2.2) MV e'                  0.060 m/s   (>8.0) MV lateral e'          0.06 m/s MV medial e'           0.06 m/s MV A Dur:              117.03 msec E/e' Ratio:            32.04       (<8.0)  MITRAL VALVE:           Normal Ranges: MV Vmax:      1.79 m/s  (<=1.3m/s) MV peak P.9 mmHg (<5mmHg) MV mean P.8 mmHg  (<2mmHg) MV VTI:       49.37 cm  (10-13cm) MV DT:        238 msec  (150-240msec)  AORTIC VALVE:                      Normal Ranges: AoV Vmax:                2.87 m/s  (<=1.7m/s) AoV Peak P.8 mmHg (<20mmHg) AoV Mean P.2 mmHg (1.7-11.5mmHg) LVOT Max Jamarcus:            1.18 m/s  (<=1.1m/s) AoV VTI:                 53.97 cm  (18-25cm) LVOT VTI:                24.65 cm LVOT Diameter:           1.76 cm    (1.8-2.4cm) AoV Area, VTI:           1.11 cm2  (2.5-5.5cm2) AoV Area,Vmax:           1.00 cm2  (2.5-4.5cm2) AoV Dimensionless Index: 0.46  RIGHT VENTRICLE: RV Basal 4.20 cm RV Mid   3.40 cm RV Major 8.7 cm TAPSE:   13.0 mm RV s'    0.10 m/s  TRICUSPID VALVE/RVSP:          Normal Ranges: Peak TR Velocity:     2.85 m/s RV Syst Pressure:     36 mmHg  (< 30mmHg) IVC Diam:             1.00 cm  PULMONIC VALVE:          Normal Ranges: PV Max Jamarcus:     1.0 m/s  (0.6-0.9m/s) PV Max PG:      3.8 mmHg  AORTA: Asc Ao Diam 2.55 cm  [id number] Lyndsey Moncada MD Electronically signed on 12/4/2024 at 12:54:13 PM  ** Final **     Transesophageal Echo (LAURA)    Result Date: 5/1/2024           [place] [address]            Phone [phone] TRANSESOPHAGEAL ECHOCARDIOGRAM REPORT  Patient Name:     RITA PABLO MAVERICK Reading Physician:   [id number] Flores DO Study Date:       5/1/2024             Ordering Provider:   [id number] FLORES MRN/PID:          [id number]             Fellow: Accession#:       [id number]         Nurse: Date of           1975 / 48      Sonographer:         Dionte Vasquez RDCS Birth/Age:        years Gender:           F                    Additional Staff: Height:           172.00 cm            Admit Date: Weight:           70.31 kg             Admission Status:    Inpatient - Routine BSA / BMI:        1.83 m2 / 23.77      Department Location: [facility] ICU                   kg/m2 Blood Pressure: 153 /72 mmHg Study Type:    TRANSESOPHAGEAL ECHO (LAURA) Diagnosis/ICD: Viral endocarditis-B33.21 Indication:    Endocarditis CPT Codes:     LAURA Complete-19413  Study Detail: The following Echo studies were performed: 2D, M-Mode and color               flow.  PHYSICIAN INTERPRETATION: LAURA Details: The LAURA probe used was [id number]. Technically adequate omniplane transesophageal echocardiogram performed. LAURA Medication:  The patient was sedated by Anesthesia; please refer to anesthesia flow sheet for medications used. LAURA Procedure: The probe was passed without difficulty. The following complication was encountered during the procedure: Patient tolerated the procedure well without any apparent complications. Left Ventricle: Left ventricular systolic function is normal. There are no regional wall motion abnormalities. The left ventricular cavity size is normal. Left ventricular diastolic filling was indeterminate. Left Atrium: The left atrium is normal in size. There is a normal sized left atrial appendage, there is no thrombus visualized in the left atrial appendage and the left atrial appendage Doppler velocities are normal. Right Ventricle: The right ventricle is normal in size. There is normal right ventricular global systolic function. Right Atrium: The right atrium was not well visualized. Aortic Valve: There is a prosthetic aortic valve present. Echo findings are consistent with normal aortic valve prosthesis structure and function. There is no evidence of aortic valve regurgitation. Mitral Valve: There is a prosthetic mitral valve present. Echo findings are consistent with normal mitral valve prosthesis structure and function. There is trace mitral valve regurgitation. Trace prosthetic mitral regurgitation. Mobile echodensity previously seen is not visualized. Tricuspid Valve: The tricuspid valve was not well visualized. Tricuspid regurgitation was not assessed. Pulmonic Valve: The pulmonic valve is not well visualized. The pulmonic valve regurgitation was not well visualized. Pericardium: There is no pericardial effusion noted. Aorta: The aortic root is normal.  CONCLUSIONS:  1. Left ventricular systolic function is normal. QUANTITATIVE DATA SUMMARY:  [id number] Fabian Skaggs DO Electronically signed on 5/1/2024 at 3:47:59 PM  ** Final **     Transthoracic Echo (TTE) Complete    Result Date: 4/29/2024           [facility] [address]            Phone [phone] TRANSTHORACIC ECHOCARDIOGRAM REPORT  Patient Name:      RITA PABLO MAVERICK  Reading Physician:    [id number] Skaggs DO Study Date:        4/29/2024             Ordering Provider:    [id number] SHINE                                                                LORIALE LIZAMA MRN/PID:           [id number]              Fellow: Accession#:        [id number]          Nurse: Date of Birth/Age: 1975 / 48 years Sonographer:          Dionte Vasquez RDCS Gender:            F                     Additional Staff: Height:            170.00 cm             Admit Date: Weight:            69.00 kg              Admission Status:     Inpatient -                                                                Routine BSA / BMI:         1.80 m2 / 23.88 kg/m2 Department Location:  [facility] Blood Pressure: 144 /36 mmHg Study Type:    TRANSTHORACIC ECHO (TTE) COMPLETE Diagnosis/ICD: Endocarditis, valve unspecified-I38 Indication:    Endocarditis CPT Codes:     Echo Complete w Full Doppler-40370 Patient History: Pertinent History: CAD, Chest Pain, CHF, HTN and Hyperlipidemia. Pacemaker,                    ESRD, MRSA, TVr-2013/Ring, MVR-2022/#27 St Devonte, AVR-2022/#21                    Inspirus. Study Detail: The following Echo studies were performed: 2D, M-Mode, Doppler and               color flow. Technically challenging study due to poor acoustic               windows and patient lying in supine position.  PHYSICIAN INTERPRETATION: Left Ventricle: Left ventricular systolic function is normal, with an estimated ejection fraction of 65-70%. There are no regional wall motion abnormalities. The left ventricular cavity size is normal. Spectral Doppler shows an impaired relaxation pattern of left ventricular diastolic filling. Left  Atrium: The left atrium is normal in size. Right Ventricle: The right ventricle is normal in size. There is normal right ventricular global systolic function. Right Atrium: The right atrium is normal in size. Aortic Valve: There is a prosthetic aortic valve present. There is bioprosthetic aortic valve. Echo findings are consistent with normal aortic valve prosthesis structure and function. There is no evidence of aortic valve regurgitation. The peak instantaneous gradient of the aortic valve is 50.7 mmHg. The mean gradient of the aortic valve is 27.0 mmHg. Mitral Valve: There is a prosthetic mitral valve present. There is a normally functioning mitral valve prosthesis. There is no evidence of mitral valve regurgitation. Tricuspid Valve: The tricuspid valve is structurally normal. There is trace tricuspid regurgitation. Pulmonic Valve: The pulmonic valve is not well visualized. The pulmonic valve regurgitation was not well visualized. Pericardium: There is no pericardial effusion noted. Aorta: The aortic root is normal.  CONCLUSIONS:  1. Left ventricular systolic function is normal with a 65-70% estimated ejection fraction.  2. Spectral Doppler shows an impaired relaxation pattern of left ventricular diastolic filling.  3. There is a normally functioning mitral valve prosthesis.  4. There is a bioprosthetic aortic valve. QUANTITATIVE DATA SUMMARY: 2D MEASUREMENTS:                          Normal Ranges: LAs:           3.30 cm   (2.7-4.0cm) IVSd:          0.99 cm   (0.6-1.1cm) LVPWd:         0.96 cm   (0.6-1.1cm) LVIDd:         3.93 cm   (3.9-5.9cm) LVIDs:         2.52 cm LV Mass Index: 66.4 g/m2 LV % FS        35.9 % LA VOLUME:                               Normal Ranges: LA Vol A4C:        53.4 ml    (22+/-6mL/m2) LA Vol A2C:        38.7 ml LA Vol BP:         47.0 ml LA Vol Index A4C:  29.7ml/m2 LA Vol Index A2C:  21.5 ml/m2 LA Vol Index BP:   26.2 ml/m2 LA Area A4C:       17.0 cm2 LA Area A2C:       14.0 cm2 LA  Major Axis A4C: 4.6 cm LA Major Axis A2C: 4.3 cm LA Volume Index:   25.0 ml/m2 LA Vol A4C:        49.0 ml LA Vol A2C:        37.0 ml RA VOLUME BY A/L METHOD:                       Normal Ranges: RA Area A4C: 16.0 cm2 LV SYSTOLIC FUNCTION BY 2D PLANIMETRY (MOD):                     Normal Ranges: EF-A4C View: 59.6 % (>=55%) EF-A2C View: 65.1 % EF-Biplane:  61.8 % LV DIASTOLIC FUNCTION:                     Normal Ranges: MV Peak E: 1.56 m/s (0.7-1.2 m/s) MV Peak A: 1.58 m/s (0.42-0.7 m/s) E/A Ratio: 0.99     (1.0-2.2) MITRAL VALVE:                       Normal Ranges: MV Vmax:    1.66 m/s  (<=1.3m/s) MV peak P.0 mmHg (<5mmHg) MV mean P.0 mmHg  (<48mmHg) MV DT:      254 msec  (150-240msec) AORTIC VALVE:                                    Normal Ranges: AoV Vmax:                3.56 m/s  (<=1.7m/s) AoV Peak P.7 mmHg (<20mmHg) AoV Mean P.0 mmHg (1.7-11.5mmHg) LVOT Max Jamarcus:            1.41 m/s  (<=1.1m/s) AoV VTI:                 64.40 cm  (18-25cm) LVOT VTI:                30.60 cm AoV Dimensionless Index: 0.48  RIGHT VENTRICLE: RV Basal 3.39 cm RV Mid   2.40 cm RV Major 6.2 cm TAPSE:   17.5 mm RV s'    0.12 m/s TRICUSPID VALVE/RVSP:                             Normal Ranges: Peak TR Velocity: 2.58 m/s RV Syst Pressure: 29.6 mmHg (< 30mmHg) IVC Diam:         1.41 cm PULMONIC VALVE:                         Normal Ranges: PV Accel Time: 92 msec  (>120ms) PV Max Jamarcus:    1.2 m/s  (0.6-0.9m/s) PV Max P.9 mmHg  [id number] Fabian Skaggs DO Electronically signed on 2024 at 2:07:09 PM  ** Final **     Transesophageal Echo (LAURA)    Result Date: 2024           [place] [address]            Phone [phone] TRANSESOPHAGEAL ECHOCARDIOGRAM REPORT  Patient Name:     RITASUSHILA TEMPLE Reading Physician:   [id number] Fabian Skaggs DO Study Date:       2024             Ordering Provider:   [id number] POLLY FLORES MRN/PID:          [id number]             Fellow: Accession#:       [id number]         Nurse: Date of           1975 / [birth year]      Sonographer:         Dionte Vasquez RDCS Birth/Age:        years Gender:           F                    Additional Staff: Height:           165.00 cm            Admit Date: Weight:           65.00 kg             Admission Status:    Inpatient - Routine BSA:              1.72 m2              Department Location: [facility] Lab Blood Pressure: 142 /50 mmHg Study Type:    TRANSESOPHAGEAL ECHO (LAURA) Diagnosis/ICD: 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 ESRD, Pacemaker, s/p                    Redo-sternotomy with TVr-ring repair, AVR #21 Inspirus, MVR                    #27 [facility] [place] Epic Bioprosthesis. Study Detail: The following Echo studies were performed: 2D, color flow and               Doppler.  PHYSICIAN INTERPRETATION: LAURA Details: The LAURA probe used was [id number]. Technically adequate omniplane transesophageal echocardiogram performed. Color flow Doppler echo was performed to assess for the presence of a patent foramen ovale. LAURA Medication: The patient was sedated by Anesthesia; please refer to anesthesia flow sheet for medications used. LAURA Procedure: The probe was passed without difficulty. Left Ventricle: Left ventricular systolic function is normal. There are no regional wall motion abnormalities. The left ventricular cavity size is normal. Left ventricular diastolic filling was not assessed. Left Atrium: The left atrium is normal in size. There is a normal sized left atrial appendage, the left atrial appendage Doppler velocities are normal and there is no thrombus visualized in the left atrial appendage. Right Ventricle: The right ventricle is normal in size. There is normal right ventricular global systolic function.  Right Atrium: The right atrium is normal in size. Aortic Valve: There is no visualized aortic valve vegetation. There is a prosthetic aortic valve present. There is bioprosthetic aortic valve. There is no evidence of aortic valve regurgitation. Mitral Valve: There is a prosthetic mitral valve present. There is a mitral valve bioprosthesis. There is trace mitral valve regurgitation. There is a mobile echodensity at the base of the anterior leaflet suspicious for vegetation, decreased in size in comparison to 9/2023 study. There is a small perforation of this leaflet associated but overall the valve integrity is fairly well preserved. Tricuspid Valve: The tricuspid valve is structurally normal. There is trace tricuspid regurgitation. Pulmonic Valve: The pulmonic valve was not assessed. The pulmonic valve regurgitation was not assessed. Pericardium: There is no pericardial effusion noted. Aorta: The aortic root is normal.  CONCLUSIONS:  1. Left ventricular systolic function is normal.  2. There is a mobile echodensity at the base of the anterior leaflet suspicious for vegetation, decreased in size in comparison to 9/2023 study. There is a small perforation of this leaflet associated but overall the valve integrity is fairly well preserved.  3. No aortic valve vegetation visualized.  4. There is a bioprosthetic aortic valve. QUANTITATIVE DATA SUMMARY:  [id number] Fabian Skaggs DO Electronically signed on 1/12/2024 at 1:32:47 PM  ** Final **     Transthoracic Echo (TTE) Complete    Result Date: 1/11/2024           [place] [address]            Phone [phone] TRANSTHORACIC ECHOCARDIOGRAM REPORT  Patient Name:     RITA TEMPLE Reading Physician:   [id number] Fabian Skaggs DO Study Date:       1/10/2024            Ordering Provider:   [id number] MASSIEL NARANJO MRN/PID:          [id number]             Fellow: Accession#:        [id number]         Nurse: Date of           1975 / 48      Sonographer:         Lety Ivy ANJANA Birth/Age:        years Gender:           F                    Additional Staff: Height:           165.10 cm            Admit Date: Weight:           63.96 kg             Admission Status:    Inpatient - Routine BSA:              1.71 m2              Department Location: [facility] Blood Pressure: 110 /36 mmHg Study Type:    TRANSTHORACIC ECHO (TTE) COMPLETE Diagnosis/ICD: Presence of prosthetic heart valve-Z95.2 Indication:    bacteremia CPT Codes:     Echo Complete w Full Doppler-52771 Patient History: Pacer/Defib:       Permanent pacemaker Pertinent History: HTN. s/p MVR, s/p AVR,bacteremia, PAF,ESRD, Anemia,                    CABG,endocarditis. Study Detail: The following Echo studies were performed: 2D, M-Mode, color flow               and Doppler. Technically challenging study due to dialysis cath lt               parasternal.  PHYSICIAN INTERPRETATION: Left Ventricle: Left ventricular systolic function is normal, with an estimated ejection fraction of 60-65%. There are no regional wall motion abnormalities. The left ventricular cavity size is normal. Spectral Doppler shows a normal pattern of left ventricular diastolic filling. Left Atrium: The left atrium is normal in size. Right Ventricle: The right ventricle is normal in size. There is normal right ventricular global systolic function. Right Atrium: The right atrium is normal in size. Aortic Valve: There is no visualized aortic valve vegetation. There is a prosthetic aortic valve present. There is bioprosthetic aortic valve. There is no evidence of aortic valve regurgitation. The peak instantaneous gradient of the aortic valve is 33.2 mmHg. The mean gradient of the aortic valve is 19.0 mmHg. Mitral Valve: There is a prosthetic mitral valve present. There is a normally functioning mitral valve prosthesis. There was no mitral valve vegetation  visualized. There is trace mitral valve regurgitation. Tricuspid Valve: The tricuspid valve is structurally normal. There is trace tricuspid regurgitation. Pulmonic Valve: The pulmonic valve is not well visualized. The pulmonic valve regurgitation was not well visualized. Pericardium: There is no pericardial effusion noted. Aorta: The aortic root is normal.  CONCLUSIONS:  1. Left ventricular systolic function is normal with a 60-65% estimated ejection fraction.  2. No mitral valve vegetation visualized.  3. There is a normally functioning mitral valve prosthesis.  4. No aortic valve vegetation visualized.  5. There is a bioprosthetic aortic valve. QUANTITATIVE DATA SUMMARY: 2D MEASUREMENTS:                          Normal Ranges: IVSd:          0.85 cm   (0.6-1.1cm) LVPWd:         0.72 cm   (0.6-1.1cm) LVIDd:         3.87 cm   (3.9-5.9cm) LV Mass Index: 50.5 g/m2 LV SYSTOLIC FUNCTION BY 2D PLANIMETRY (MOD):                     Normal Ranges: EF-A4C View: 60.9 % (>=55%) EF-A2C View: 65.0 % EF-Biplane:  62.9 % LV DIASTOLIC FUNCTION:                     Normal Ranges: MV Peak E: 1.87 m/s (0.7-1.2 m/s) MV Peak A: 1.01 m/s (0.42-0.7 m/s) E/A Ratio: 1.85     (1.0-2.2) MITRAL VALVE:                       Normal Ranges: MV Vmax:    1.78 m/s  (<=1.3m/s) MV peak P.7 mmHg (<5mmHg) MV mean P.0 mmHg  (<48mmHg) MV DT:      320 msec  (150-240msec) AORTIC VALVE:                                    Normal Ranges: AoV Vmax:                2.88 m/s  (<=1.7m/s) AoV Peak P.2 mmHg (<20mmHg) AoV Mean P.0 mmHg (1.7-11.5mmHg) LVOT Max Jamarcus:            1.12 m/s  (<=1.1m/s) AoV VTI:                 62.50 cm  (18-25cm) LVOT VTI:                21.00 cm LVOT Diameter:           2.00 cm   (1.8-2.4cm) AoV Area, VTI:           1.06 cm2  (2.5-5.5cm2) AoV Area,Vmax:           1.22 cm2  (2.5-4.5cm2) AoV Dimensionless Index: 0.34 TRICUSPID VALVE/RVSP:                             Normal Ranges: Peak TR Velocity:  2.84 m/s RV Syst Pressure: 35.3 mmHg (< 30mmHg) IVC Diam:         1.28 cm  [id number] Fabian Skaggs DO Electronically signed on 1/11/2024 at 11:38:48 AM  ** Final **    === 12/02/24 ===    XR CHEST 1 VIEW    - Impression -  1.  Developing right basal infiltrate. See discussion above. Possible  developing retrocardiac left basal infiltrate.        MACRO:  None    Signed by: Joseph Schoenberger 12/2/2024 3:59 PM  Dictation workstation:   [id number]  === 12/02/24 ===    CT ABDOMEN PELVIS WO IV CONTRAST    - Impression -  1.  Large amount of formed stool throughout the colon without wall  thickening or inflammatory change suggestive of constipation.  Clinical correlation recommended.  2. Nonspecific gallbladder distention without radiopaque calculi.  Unchanged mild splenomegaly. Correlation with liver enzymes is  recommended.  3. Unchanged 4.4 x 3.9 cm right ovarian cyst. Follow-up outpatient  ultrasound recommended.      Signed by: Harinder De La Rosa 12/2/2024 8:03 PM  Dictation workstation:   [id number]  === 12/02/24 ===    XR CHEST 1 VIEW    - Impression -  1.  Developing right basal infiltrate. See discussion above. Possible  developing retrocardiac left basal infiltrate.        MACRO:  None    Signed by: Joseph Schoenberger 12/2/2024 3:59 PM  Dictation workstation:   [id number]      Additional results of the last 24 hours have been reviewed.    Dictated using [employer] Version 2.4  Proof read however unrecognized voice recognition errors may have occurred     Electronically signed by Blayne East DO on 12/06/24 at 9:59 AM

## 2024-12-06 NOTE — PRE-PROCEDURE NOTE
.Report from Sending RN:    Report From: FARAZ Maldonado  Recent Surgery of Procedure: No  Baseline Level of Consciousness (LOC): A/O x's 4  Oxygen Use: No  Type: Room air  Diabetic: No  Last BP Med Given Day of Dialysis: See Mar  Last Pain Med Given: See Mar  Lab Tests to be Obtained with Dialysis: No  Blood Transfusion to be Given During Dialysis: No  Available IV Access: Yes  Medications to be Administered During Dialysis: Yes  Continuous IV Infusion Running: No  Restraints on Currently or in the Last 24 Hours: No  Hand-Off Communication: Pt was resting and eating dinner upon arrival. Pt stated she didn't understand why she was getting another treatment because she is M,W, F patient. Waited for start of treatment per [facility] due to her finding out why she needed treatment. Per [facility] patient is getting line removed due to infected catheter and to move forward with treatment due to line holiday.  Dialysis Catheter Dressing: Yes   Last Dressing Change: 12/4/2024

## 2024-12-06 NOTE — PROGRESS NOTES
NEPHROLOGY NEW CONSULT NOTE   Patient ID: Batsheva Page is a 49 y.o. female.     Reason for consult: ESRD-HD    No chief complaint on file.       HPI  Batsheva Page is a 49 y.o. female   - With past medical Hx as below   - Admitted for sepsis/possible CLABSI  - Nephrology was consulted for ESRD management     Past Medical History:   Diagnosis Date    Aortic valve replaced 2022    CHF (congestive heart failure)     Chronic pain     Coronary artery disease     Disease of thyroid gland     ESRD (end stage renal disease) (Multi)     H/O mitral valve replacement 2013    and 2022    Heart disease     History of transfusion     Hypertension     Mitral valve regurgitation     Seizures (Multi)     Stroke (Multi)       Past Surgical History:   Procedure Laterality Date    AORTIC VALVE REPLACEMENT  09/26/2022    bioprosthetic    CORONARY ARTERY BYPASS GRAFT      IR CVC  01/12/2024    exchange    IR CVC  05/07/2024    exchange    IR CVC  08/20/2024    removal    IR CVC TUNNELED  09/09/2022    IR CVC TUNNELED 9/9/2022 [facility]    IR CVC TUNNELED  12/28/2022    IR CVC TUNNELED 12/28/2022 [facility] IN[facility]    MITRAL VALVE REPAIR  2013    MITRAL VALVE REPLACEMENT  09/26/2022    bioprosthetic    PARATHYROIDECTOMY      TRICUSPID VALVULOPLASTY  2013    US GUIDED PERCUTANEOUS PLACEMENT  07/14/2022    US GUIDED PERCUTANEOUS PLACEMENT [facility]      Family History   Problem Relation Name Age of Onset    No Known Problems Mother      No Known Problems Father       Social History     Socioeconomic History    Marital status:      Spouse name: Not on file    Number of children: Not on file    Years of education: Not on file    Highest education level: Not on file   Occupational History    Not on file   Tobacco Use    Smoking status: Never    Smokeless tobacco: Never   Vaping Use    Vaping status: Never Used   Substance and Sexual Activity    Alcohol use: Never    Drug use: Never    Sexual  activity: Not on file     Comment: post menopausal from dialysys   Other Topics Concern    Not on file   Social History Narrative    Not on file     Social Drivers of Health     Financial Resource Strain: Low Risk  (12/3/2024)    Overall Financial Resource Strain (CARDIA)     Difficulty of Paying Living Expenses: Not very hard   Food Insecurity: No Food Insecurity (12/3/2024)    Hunger Vital Sign     Worried About Running Out of Food in the Last Year: Never true     Ran Out of Food in the Last Year: Never true   Transportation Needs: No Transportation Needs (12/3/2024)    PRAPARE - Transportation     Lack of Transportation (Medical): No     Lack of Transportation (Non-Medical): No   Physical Activity: Inactive (12/3/2024)    Exercise Vital Sign     Days of Exercise per Week: 0 days     Minutes of Exercise per Session: 0 min   Stress: No Stress Concern Present (12/3/2024)    [place] [employer] - Occupational Stress Questionnaire     Feeling of Stress : Not at all   Social Connections: Moderately Isolated (4/29/2024)    Social Connection and Isolation Panel [NHANES]     Frequency of Communication with Friends and Family: More than three times a week     Frequency of Social Gatherings with Friends and Family: More than three times a week     Attends Nondenominational Services: More than 4 times per year     Active Member of Clubs or Organizations: No     Attends Club or Organization Meetings: Never     Marital Status:    Intimate Partner Violence: Not At Risk (12/3/2024)    Humiliation, Afraid, Rape, and Kick questionnaire     Fear of Current or Ex-Partner: No     Emotionally Abused: No     Physically Abused: No     Sexually Abused: No   Housing Stability: Low Risk  (12/3/2024)    Housing Stability Vital Sign     Unable to Pay for Housing in the Last Year: No     Number of Times Moved in the Last Year: 0     Homeless in the Last Year: No     Allergies   Allergen Reactions    Kayexalate Seizure     "Metoclopramide Hcl Other     anxious, agitated, \"skin crawl    Prochlorperazine Other     anxious, agitated, \"skin crawl    Zofran [Ondansetron Hcl] Headache    Ondansetron Other and Headache        Scheduled medications  acetaminophen, 975 mg, oral, q8h  aspirin, 81 mg, oral, Daily  atorvastatin, 40 mg, oral, Daily  calcitriol, 0.5 mcg, oral, Daily  ceftaroline, 200 mg, intravenous, q12h  daptomycin, 6 mg/kg (Adjusted), intravenous, q48h  docusate sodium, 100 mg, oral, BID  [START ON 12/8/2024] ergocalciferol, 1,250 mcg, oral, Every Edinson  fentaNYL, 50 mcg, intravenous, Once  heparin, 1,900 Units, intra-catheter, After Dialysis  heparin, 1,900 Units, intra-catheter, After Dialysis  lactulose, 20 g, oral, TID  levETIRAcetam, 750 mg, oral, Nightly  metoprolol tartrate, 50 mg, oral, BID  midazolam, 1 mg, intravenous, Once  midazolam, , ,   pregabalin, 50 mg, oral, BID      Continuous medications     PRN medications  PRN medications: dextromethorphan-guaifenesin, diphenhydrAMINE, diphenhydrAMINE, diphenhydrAMINE, guaiFENesin, HYDROmorphone, hydrOXYzine HCL, midazolam, oxyCODONE **OR** oxyCODONE, promethazine **OR** promethazine   Meds:   acetaminophen, 975 mg, q8h  aspirin, 81 mg, Daily  atorvastatin, 40 mg, Daily  calcitriol, 0.5 mcg, Daily  ceftaroline, 200 mg, q12h  daptomycin, 6 mg/kg (Adjusted), q48h  docusate sodium, 100 mg, BID  [START ON 12/8/2024] ergocalciferol, 1,250 mcg, Every Edinson  fentaNYL, 50 mcg, Once  heparin, 1,900 Units, After Dialysis  heparin, 1,900 Units, After Dialysis  lactulose, 20 g, TID  levETIRAcetam, 750 mg, Nightly  metoprolol tartrate, 50 mg, BID  midazolam, 1 mg, Once  midazolam, ,   pregabalin, 50 mg, BID         dextromethorphan-guaifenesin, 5 mL, q4h PRN  diphenhydrAMINE, 12.5 mg, q12h PRN  diphenhydrAMINE, 50 mg, Daily PRN  diphenhydrAMINE, 50 mg, PRN  guaiFENesin, 600 mg, q12h PRN  HYDROmorphone, 0.2 mg, q3h PRN  hydrOXYzine HCL, 25 mg, q6h PRN  midazolam, ,   oxyCODONE, 5 mg, " "q6h PRN   Or  oxyCODONE, 10 mg, q6h PRN  promethazine, 25 mg, q6h PRN   Or  promethazine, 25 mg, q12h PRN        Heart Rate:  [63-84]   Temp:  [36.2 °C (97.2 °F)-36.9 °C (98.4 °F)]   Resp:  [11-18]   BP: (100-140)/()   Height:  [167.6 cm (5' 5.98\")]   Weight:  [71.9 kg (158 lb 9.6 oz)]   SpO2:  [94 %-100 %]    Weight: 71.5 kg (157 lb 10.1 oz)     General appearance: Awake and alert, oriented, . No distress  HEENT: supple, moist oral mucosa, no mouth ulcers  Neck: No JVD  Skin: no apparent rash  Heart: heart sounds 1 & 2 present and normal, no murmurs heard or friction rub  Lungs: Adequate air entry,  no wheezing/crackles  Abdomen: soft, non tender, no masses palpated, no flank tenderness  Extremities: No  edema, no joint swelling,  : deferred  Neuro: No FND, no asterixis   ACCESS: Tunneled hemodialysis catheter in place      Results from last 72 hours   Lab Units 12/06/24  0558   SODIUM mmol/L 133*   POTASSIUM mmol/L 4.0   CO2 mmol/L 24   BUN mg/dL 15   CREATININE mg/dL 3.56*   PHOSPHORUS mg/dL 2.9   CALCIUM mg/dL 8.4*   ALBUMIN g/dL 3.5   GLUCOSE mg/dL 82   WBC AUTO x10*3/uL 4.3*        Results from last 7 days   Lab Units 12/06/24  0558 12/05/24  0615 12/04/24  0623   WBC AUTO x10*3/uL 4.3* 4.0* 7.5   HEMOGLOBIN g/dL 11.5* 10.1* 10.6*   HEMATOCRIT % 39.9 33.7* 34.0*   PLATELETS AUTO x10*3/uL 163 142* 146*         A/P  ESRD on TTS chedule, @[facility], under care of Dr Jefferson  -HD admitted with CLABSI/sepsis-recurrent.  Currently on vancomycin--> ceftaroline/daptomycin.  ID on board.  Plan for hemodialysis catheter holiday then reinsertion of new hemodialysis catheter.  Noted, patient is hard stick.  We will evaluate daily for any urgent need for dialysis.  Tentatively resume dialysis next week after hemodialysis catheter insertion under sedation per patient preference  MBD -not on phosphate binders  Anemia of CKD: EPO to be given as an outpatient x 3 per week   Access: Infection  BP: acceptable during " treatment   Renal Diet   Daily renal MVI   Continue 3 x per week hemodialysis; SW to ensure availability of o/p HD chair at discharge  Please communicate any antibiotic needs prior to discharge directly with the dialysis unit      Dr. JOHNSON will cover over the weekend for urgent needs      Magdi Dahl MD

## 2024-12-06 NOTE — PROGRESS NOTES
"Batsheva Page is a 49 y.o. female on day 3 of admission presenting with Sepsis (Multi).    Subjective   Interval History: no fever, no new complaints        Review of Systems    Objective   Range of Vitals (last 24 hours)  Heart Rate:  [64-84]   Temp:  [36.2 °C (97.1 °F)-36.6 °C (97.9 °F)]   Resp:  [11-18]   BP: (100-181)/()   Height:  [167.6 cm (5' 5.98\")]   Weight:  [71.9 kg (158 lb 9.6 oz)]   SpO2:  [94 %-100 %]   Daily Weight  12/06/24 : 71.9 kg (158 lb 9.6 oz)    Body mass index is 25.61 kg/m².    Physical Exam  Constitutional:       Appearance: Normal appearance.   HENT:      Head: Normocephalic and atraumatic.      Mouth/Throat:      Mouth: Mucous membranes are moist.      Pharynx: Oropharynx is clear.   Eyes:      Pupils: Pupils are equal, round, and reactive to light.   Cardiovascular:      Rate and Rhythm: Normal rate and regular rhythm.      Heart sounds: Normal heart sounds.   Pulmonary:      Effort: Pulmonary effort is normal.      Breath sounds: Normal breath sounds.      Comments: Rt sided line without tenderness or drainage  Abdominal:      General: Abdomen is flat. Bowel sounds are normal.      Palpations: Abdomen is soft.   Musculoskeletal:      Cervical back: Normal range of motion.   Neurological:      Mental Status: She is alert.         Antibiotics  ceftaroline (Teflaro) IV in 50 mL D5W  DAPTOmycin (Cubicin) IV in 50 mL NS    Relevant Results  Labs  Results from last 72 hours   Lab Units 12/06/24  0558 12/05/24  0615 12/04/24  0623   WBC AUTO x10*3/uL 4.3* 4.0* 7.5   HEMOGLOBIN g/dL 11.5* 10.1* 10.6*   HEMATOCRIT % 39.9 33.7* 34.0*   PLATELETS AUTO x10*3/uL 163 142* 146*   NEUTROS PCT AUTO % 66.1 61.5 77.8   LYMPHS PCT AUTO % 17.6 21.8 10.1   MONOS PCT AUTO % 10.2 12.9 7.7   EOS PCT AUTO % 4.9 3.0 3.7     Results from last 72 hours   Lab Units 12/06/24  0558 12/05/24  0615 12/04/24  0623   SODIUM mmol/L 133* 135* 133*   POTASSIUM mmol/L 4.0 4.2 4.7   CHLORIDE mmol/L 95* 94* 94* "   CO2 mmol/L 24 25 18*   BUN mg/dL 15 28* 65*   CREATININE mg/dL 3.56* 4.99* 9.33*   GLUCOSE mg/dL 82 90 91   CALCIUM mg/dL 8.4* 7.8* 7.5*   ANION GAP mmol/L 18 20 26*   EGFR mL/min/1.73m*2 15* 10* 5*   PHOSPHORUS mg/dL 2.9 3.7 5.5*     Results from last 72 hours   Lab Units 12/06/24  0558 12/05/24  0615 12/04/24  0623   ALBUMIN g/dL 3.5 3.2* 3.1*     Estimated Creatinine Clearance: 19.2 mL/min (A) (by C-G formula based on SCr of 3.56 mg/dL (H)).  C-Reactive Protein   Date Value Ref Range Status   12/03/2024 27.39 (H) <1.00 mg/dL Final     CRP   Date Value Ref Range Status   12/25/2022 9.90 (A) mg/dL Final     Comment:     REF VALUE  < 1.00     12/23/2022 23.46 (A) mg/dL Final     Comment:     REF VALUE  < 1.00       Microbiology  Susceptibility data from last 14 days.  Collected Specimen Info Organism Clindamycin Erythromycin Oxacillin Tetracycline Trimethoprim/Sulfamethoxazole Vancomycin   12/03/24 Swab from Anterior Nares Methicillin Susceptible Staphylococcus aureus (MSSA)         12/03/24 Blood culture from Dialysis Staphylococcus aureus         12/02/24 Blood culture from Peripheral Venipuncture Staphylococcus aureus         12/02/24 Blood culture from Peripheral Venipuncture Staphylococcus aureus         12/02/24 Blood culture from Peripheral Venipuncture Staphylococcus aureus         12/02/24 Blood culture from Peripheral Venipuncture Methicillin Resistant Staphylococcus aureus (MRSA)  S  S  R  S  S  S   Imaging  Reviewed       Assessment/Plan   Sepsis / bacteremia, likely dialysis line related, she has AVR / MVR, MRSA, the repeat BC is positive, TTE without vegetations  Atelectasis  Recommendations :  Continue  Ceftaroline / Daptomycin  Discussed with the medical team     I spent minutes in the professional and overall care of this patient.      Richard Gardiner MD

## 2024-12-06 NOTE — PROGRESS NOTES
Subjective Data:  No events  She feels good  No chest pain or sob  No arrhthymias        Objective Data:  Last Recorded Vitals:  Vitals:    12/06/24 0945 12/06/24 1012 12/06/24 1031 12/06/24 1046   BP: 127/60 125/54 (!) 122/46 140/70   BP Location:  Right leg Right leg    Patient Position:  Lying Lying    Pulse: 71 73 73 69   Resp: 11 16 15 15   Temp:  36.6 °C (97.9 °F) 36.2 °C (97.2 °F) 36.7 °C (98.1 °F)   TempSrc:  Temporal Temporal Temporal   SpO2: 96% 94% 95% 94%   Weight:       Height:           Last Labs:  CBC - 12/6/2024:  5:58 AM  4.3 11.5 163    39.9      CMP - 12/6/2024:  5:58 AM  8.4 8.5 18 --- 0.7   2.9 3.5 8 106      PTT - 4/28/2024:  5:07 AM  1.4   14.0 32.6     HGBA1C   Date/Time Value Ref Range Status   12/23/2022 05:12 PM 4.6 % Final     Comment:          Diagnosis of Diabetes-Adults   Non-Diabetic: < or = 5.6%   Increased risk for developing diabetes: 5.7-6.4%   Diagnostic of diabetes: > or = 6.5%  .       Monitoring of Diabetes                Age (y)     Therapeutic Goal (%)   Adults:          >18           <7.0   Pediatrics:    13-18           <7.5                   7-12           <8.0                   0- 6            7.5-8.5   American Diabetes Association. Diabetes Care 33(S1), Jan 2010.     09/05/2022 03:04 AM 4.2 % Final     Comment:          Diagnosis of Diabetes-Adults   Non-Diabetic: < or = 5.6%   Increased risk for developing diabetes: 5.7-6.4%   Diagnostic of diabetes: > or = 6.5%  .       Monitoring of Diabetes                Age (y)     Therapeutic Goal (%)   Adults:          >18           <7.0   Pediatrics:    13-18           <7.5                   7-12           <8.0                   0- 6            7.5-8.5   American Diabetes Association. Diabetes Care 33(S1), Jan 2010.       VLDL   Date/Time Value Ref Range Status   09/05/2022 03:04 AM 45 0 - 40 mg/dL Final      Last I/O:  I/O last 3 completed shifts:  In: 1440 (20 mL/kg) [P.O.:240; I.V.:800 (11.1 mL/kg); Other:400]  Out: 2400  (33.4 mL/kg) [Other:2400]  Weight: 71.9 kg     Past Cardiology Tests (Last 3 Years):  EKG:  ECG 12 lead 12/04/2024 (Preliminary)      ECG 12 Lead 12/02/2024      ECG 12 Lead 09/23/2024      Electrocardiogram, 12-lead PRN ACS symptoms 08/21/2024      ECG 12 lead 04/04/2024      ECG 12 lead 01/08/2024    Echo:  Transthoracic Echo (TTE) Complete 12/04/2024      Transesophageal Echo (LAURA) 05/01/2024      Transthoracic Echo (TTE) Complete 04/29/2024      Transesophageal Echo (LAURA) 01/12/2024      Transthoracic Echo (TTE) Complete 01/10/2024    Ejection Fractions:  EF   Date/Time Value Ref Range Status   12/04/2024 11:00 AM 68 %    01/10/2024 02:35 PM 63       Cath:  No results found for this or any previous visit from the past 1095 days.    Stress Test:  No results found for this or any previous visit from the past 1095 days.    Cardiac Imaging:  No results found for this or any previous visit from the past 1095 days.      Inpatient Medications:  Scheduled medications   Medication Dose Route Frequency    acetaminophen  975 mg oral q8h    aspirin  81 mg oral Daily    atorvastatin  40 mg oral Daily    calcitriol  0.5 mcg oral Daily    ceftaroline  200 mg intravenous q12h    daptomycin  6 mg/kg (Adjusted) intravenous q48h    docusate sodium  100 mg oral BID    [START ON 12/8/2024] ergocalciferol  1,250 mcg oral Every Sunday    heparin  1,900 Units intra-catheter After Dialysis    heparin  1,900 Units intra-catheter After Dialysis    lactulose  20 g oral TID    levETIRAcetam  750 mg oral Nightly    metoprolol tartrate  50 mg oral BID    pregabalin  50 mg oral BID     PRN medications   Medication    dextromethorphan-guaifenesin    diphenhydrAMINE    diphenhydrAMINE    diphenhydrAMINE    guaiFENesin    HYDROmorphone    hydrOXYzine HCL    oxyCODONE    Or    oxyCODONE    promethazine    Or    promethazine     Continuous Medications   Medication Dose Last Rate       Physical Exam:  Vitals:    12/06/24 1046   BP: 140/70   Pulse:  69   Resp: 15   Temp: 36.7 °C (98.1 °F)   SpO2: 94%     Constitutional:       Appearance: Normal appearance. She is normal weight. She is ill-appearing.   HENT:      Head: Normocephalic and atraumatic.   Neck:      Vascular: No carotid bruit or JVD.   Cardiovascular:      Rate and Rhythm: Normal rate and regular rhythm.      Pulses: Normal pulses.      Heart sounds: Murmur heard.      Systolic murmur is present with a grade of 3/6.   Pulmonary:      Effort: Pulmonary effort is normal.      Breath sounds: Normal breath sounds.   Chest:      Chest wall: No tenderness.      Comments: Right sided tunneled dialysis catheter  Abdominal:      General: Abdomen is flat. Bowel sounds are normal.      Palpations: Abdomen is soft.   Skin:     General: Skin is warm and dry.   Neurological:      General: No focal deficit present.      Mental Status: She is alert and oriented to person, place, and time. Mental status is at baseline.   Psychiatric:         Mood and Affect: Mood normal.         Behavior: Behavior normal.        Assessment/Plan   49 y.o. 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 EKG shows NSR with PACs. BC positive for MRSA. TTE with LVEF 65-70% and normal aortic and mitral valve prosthesis structure and function. Recurrent episodes of SVT, nonsustained VT throughout the night. She was started on amiodarone drip. Mag WNL. C 9/2022 showed mild nonobstructive CAD.      Recurrent MRSA bacteremia - likely related to tunneled dialysis catheter per ID, LAURA today with infection of the HD catheter. No evidence of endocarditis  Bioprosthetic AVR and MVR  Tricuspid valve ring repair  ESRD on hemodialysis  HTN  Paroxysmal SVT  Nonsustained VT    Plan  Would recommend catheter removal.   Rest of management per 1ry team and ID  Continue metoprolol  Regular follow  up with [facility] cardiologist     Will sign off  Thank you      Peripheral IV 12/02/24 20 G 5.7 cm Left Antecubital (Active)   Site Assessment Clean;Dry;Intact 12/06/24 0900   Dressing Status Clean;Dry;Occlusive 12/06/24 0900   Number of days: 4       Hemodialysis Cath 08/26/24 Double lumen Right Tunneled catheter Chest (Active)   Site Assessment Clean;Dry;Intact 12/06/24 0900   Dressing Status Clean;Dry;Occlusive 12/06/24 0900   Number of days: 102       Code Status:  Full Code    I spent  minutes in the professional and overall care of this patient.        Lyndsey Moncada MD

## 2024-12-06 NOTE — PROGRESS NOTES
12/06/24 1236   Discharge Planning   Living Arrangements Spouse/significant other   Support Systems Spouse/significant other;Children;Family members   Assistance Needed Patient is A&OX3, independent in ADLs, ambulates without assistive devices unless a lot of walking will use a cane, does not drive but family able to transport, goes to dialysis M, W, F at 0500 at [facility] [place], no active HC agency, No O2, CPAP or Bipap at baseline.   Type of Residence Private residence   Number of Stairs to Enter Residence 4   Number of Stairs Within Residence 0   Do you have animals or pets at home? No   Who is requesting discharge planning? Provider   Home or Post Acute Services None   Expected Discharge Disposition Home  (denies any [facility] needs)   Does the patient need discharge transport arranged? No   Stroke Family Assessment   Stroke Family Assessment Needed No   Intensity of Service   Intensity of Service 0-30 min

## 2024-12-06 NOTE — CARE PLAN
The patient's goals for the shift include      The clinical goals for the shift include Pt will have pain controlled this shift.    Over the shift, the patient did make progress toward the following goals. Barriers to progression include complications of comorbidities. Recommendations to address these barriers include monitor and medicate as ordered this shift. Pt had HD tunneled cath removed at bedside by Dr Merino. Site and dressing are clean and dry, no sign of hematoma, VS stable. Pt reports slight tenderness at site, as expected. Monitored and medicated as ordered.

## 2024-12-06 NOTE — POST-PROCEDURE NOTE
.Report to Receiving RN:    Report To: FARAZ Maldonado  Time Report Called: [id number]  Hand-Off Communication: Pt removed 2 liters of fluid pt current vitals are 146/58 Hr 76 temp 97.2  Complications During Treatment: No  Ultrafiltration Treatment: No  Medications Administered During Dialysis: No  Blood Products Administered During Dialysis: No  Labs Sent During Dialysis: No  Heparin Drip Rate Changes: No  Dialysis Catheter Dressing: YES   Last Dressing Change: 12/4/2024    Electronic Signatures:  (Signed Jeferson Fairbanks)    Last Updated: 11:50 PM by JEFERSON FAIRBANKS

## 2024-12-06 NOTE — POST-PROCEDURE NOTE
Tunneled catheter removal    Batsheva Page is a 49 y.o. female with recurrent MRSA bacteremia requiring removal of likely infected tunneled central line. Patient requesting anesthesia for procedure as she has had difficult experiences in the past. Discussed with the patient expectations, risks, and benefits of administration of IV sedation in the room. Patient agreeable to removal at bedside.     Patient was in supine position. The right side of the neck and catheter were prepped and draped in typical sterile fashion. Continuous pulse ox, BP, and HR monitoring. Supplemental O2 via nasal cannula. Jesusita Yan CRNA administered 1mg of midazolam x5 over 10 minutes for a total of 5mg to achieve appropriate sedation. After patient was comfortable, 2% lidocaine was injected subcutaneously around the catheter tract. Blunt dissection of the tract was performed and the catheter was then removed without complications.    The patient was then placed in an upright position and pressure was held over the entry site in the right internal jugular vein and hemostasis was achieved. Dressing placed over the skin wound on the subclavicular region. The patient tolerated the procedure well. No immediate complications. Vitals remained stable.     Will order f/u chest xray.

## 2024-12-07 LAB
ALBUMIN SERPL BCP-MCNC: 3.3 G/DL (ref 3.4–5)
ANION GAP SERPL CALC-SCNC: 20 MMOL/L (ref 10–20)
BACTERIA BLD AEROBE CULT: ABNORMAL
BACTERIA BLD AEROBE CULT: ABNORMAL
BACTERIA BLD CULT: ABNORMAL
BACTERIA BLD CULT: ABNORMAL
BASOPHILS # BLD AUTO: 0.03 X10*3/UL (ref 0–0.1)
BASOPHILS NFR BLD AUTO: 0.6 %
BUN SERPL-MCNC: 29 MG/DL (ref 6–23)
CALCIUM SERPL-MCNC: 8.2 MG/DL (ref 8.6–10.3)
CHLORIDE SERPL-SCNC: 98 MMOL/L (ref 98–107)
CO2 SERPL-SCNC: 22 MMOL/L (ref 21–32)
CREAT SERPL-MCNC: 5.46 MG/DL (ref 0.5–1.05)
EGFRCR SERPLBLD CKD-EPI 2021: 9 ML/MIN/1.73M*2
EOSINOPHIL # BLD AUTO: 0.64 X10*3/UL (ref 0–0.7)
EOSINOPHIL NFR BLD AUTO: 12.2 %
ERYTHROCYTE [DISTWIDTH] IN BLOOD BY AUTOMATED COUNT: 16.8 % (ref 11.5–14.5)
GLUCOSE SERPL-MCNC: 98 MG/DL (ref 74–99)
GRAM STN SPEC: ABNORMAL
HCT VFR BLD AUTO: 39 % (ref 36–46)
HGB BLD-MCNC: 11.6 G/DL (ref 12–16)
IMM GRANULOCYTES # BLD AUTO: 0.04 X10*3/UL (ref 0–0.7)
IMM GRANULOCYTES NFR BLD AUTO: 0.8 % (ref 0–0.9)
LYMPHOCYTES # BLD AUTO: 0.92 X10*3/UL (ref 1.2–4.8)
LYMPHOCYTES NFR BLD AUTO: 17.6 %
MAGNESIUM SERPL-MCNC: 1.98 MG/DL (ref 1.6–2.4)
MCH RBC QN AUTO: 27.8 PG (ref 26–34)
MCHC RBC AUTO-ENTMCNC: 29.7 G/DL (ref 32–36)
MCV RBC AUTO: 93 FL (ref 80–100)
MONOCYTES # BLD AUTO: 0.42 X10*3/UL (ref 0.1–1)
MONOCYTES NFR BLD AUTO: 8 %
NEUTROPHILS # BLD AUTO: 3.18 X10*3/UL (ref 1.2–7.7)
NEUTROPHILS NFR BLD AUTO: 60.8 %
NRBC BLD-RTO: 0 /100 WBCS (ref 0–0)
PHOSPHATE SERPL-MCNC: 4 MG/DL (ref 2.5–4.9)
PLATELET # BLD AUTO: 177 X10*3/UL (ref 150–450)
POTASSIUM SERPL-SCNC: 4 MMOL/L (ref 3.5–5.3)
RBC # BLD AUTO: 4.18 X10*6/UL (ref 4–5.2)
SODIUM SERPL-SCNC: 136 MMOL/L (ref 136–145)
WBC # BLD AUTO: 5.2 X10*3/UL (ref 4.4–11.3)

## 2024-12-07 PROCEDURE — 83735 ASSAY OF MAGNESIUM: CPT | Performed by: INTERNAL MEDICINE

## 2024-12-07 PROCEDURE — 2060000001 HC INTERMEDIATE ICU ROOM DAILY

## 2024-12-07 PROCEDURE — 2500000001 HC RX 250 WO HCPCS SELF ADMINISTERED DRUGS (ALT 637 FOR MEDICARE OP): Performed by: NURSE PRACTITIONER

## 2024-12-07 PROCEDURE — 36415 COLL VENOUS BLD VENIPUNCTURE: CPT | Performed by: INTERNAL MEDICINE

## 2024-12-07 PROCEDURE — 2500000004 HC RX 250 GENERAL PHARMACY W/ HCPCS (ALT 636 FOR OP/ED): Performed by: INTERNAL MEDICINE

## 2024-12-07 PROCEDURE — 85025 COMPLETE CBC W/AUTO DIFF WBC: CPT | Performed by: INTERNAL MEDICINE

## 2024-12-07 PROCEDURE — 80069 RENAL FUNCTION PANEL: CPT | Performed by: INTERNAL MEDICINE

## 2024-12-07 PROCEDURE — 94760 N-INVAS EAR/PLS OXIMETRY 1: CPT

## 2024-12-07 PROCEDURE — 99232 SBSQ HOSP IP/OBS MODERATE 35: CPT | Performed by: INTERNAL MEDICINE

## 2024-12-07 PROCEDURE — 2500000001 HC RX 250 WO HCPCS SELF ADMINISTERED DRUGS (ALT 637 FOR MEDICARE OP): Performed by: INTERNAL MEDICINE

## 2024-12-07 ASSESSMENT — COGNITIVE AND FUNCTIONAL STATUS - GENERAL
CLIMB 3 TO 5 STEPS WITH RAILING: A LITTLE
MOBILITY SCORE: 19
MOVING TO AND FROM BED TO CHAIR: A LITTLE
DAILY ACTIVITIY SCORE: 24
STANDING UP FROM CHAIR USING ARMS: A LITTLE
WALKING IN HOSPITAL ROOM: A LITTLE
MOBILITY SCORE: 23
CLIMB 3 TO 5 STEPS WITH RAILING: A LITTLE
DAILY ACTIVITIY SCORE: 24
TURNING FROM BACK TO SIDE WHILE IN FLAT BAD: A LITTLE

## 2024-12-07 ASSESSMENT — PAIN DESCRIPTION - LOCATION
LOCATION: GENERALIZED
LOCATION: GENERALIZED

## 2024-12-07 ASSESSMENT — PAIN SCALES - GENERAL
PAINLEVEL_OUTOF10: 10 - WORST POSSIBLE PAIN

## 2024-12-07 ASSESSMENT — PAIN - FUNCTIONAL ASSESSMENT: PAIN_FUNCTIONAL_ASSESSMENT: 0-10

## 2024-12-07 NOTE — PROGRESS NOTES
Batsheva Page is a 49 y.o. female on day 4 of admission presenting with Sepsis (Multi).    Subjective   Interval History: no fever, no new complaints        Review of Systems    Objective   Range of Vitals (last 24 hours)  Heart Rate:  [62-73]   Temp:  [36.2 °C (97.2 °F)-36.9 °C (98.4 °F)]   Resp:  [15-18]   BP: (120-160)/(46-83)   SpO2:  [94 %-99 %]   Daily Weight  12/06/24 : 71.9 kg (158 lb 9.6 oz)    Body mass index is 25.61 kg/m².    Physical Exam  Constitutional:       Appearance: Normal appearance.   HENT:      Head: Normocephalic and atraumatic.      Mouth/Throat:      Mouth: Mucous membranes are moist.      Pharynx: Oropharynx is clear.   Eyes:      Pupils: Pupils are equal, round, and reactive to light.   Cardiovascular:      Rate and Rhythm: Normal rate and regular rhythm.      Heart sounds: Normal heart sounds.   Pulmonary:      Effort: Pulmonary effort is normal.      Breath sounds: Normal breath sounds.      Comments: Rt sided line was removed  Abdominal:      General: Abdomen is flat. Bowel sounds are normal.      Palpations: Abdomen is soft.   Musculoskeletal:      Cervical back: Normal range of motion.   Neurological:      Mental Status: She is alert.         Antibiotics  ceftaroline (Teflaro) IV in 50 mL D5W  DAPTOmycin (Cubicin) IV in 50 mL NS    Relevant Results  Labs  Results from last 72 hours   Lab Units 12/07/24  0604 12/06/24  0558 12/05/24  0615   WBC AUTO x10*3/uL 5.2 4.3* 4.0*   HEMOGLOBIN g/dL 11.6* 11.5* 10.1*   HEMATOCRIT % 39.0 39.9 33.7*   PLATELETS AUTO x10*3/uL 177 163 142*   NEUTROS PCT AUTO % 60.8 66.1 61.5   LYMPHS PCT AUTO % 17.6 17.6 21.8   MONOS PCT AUTO % 8.0 10.2 12.9   EOS PCT AUTO % 12.2 4.9 3.0     Results from last 72 hours   Lab Units 12/07/24  0604 12/06/24  0558 12/05/24  0615   SODIUM mmol/L 136 133* 135*   POTASSIUM mmol/L 4.0 4.0 4.2   CHLORIDE mmol/L 98 95* 94*   CO2 mmol/L 22 24 25   BUN mg/dL 29* 15 28*   CREATININE mg/dL 5.46* 3.56* 4.99*   GLUCOSE mg/dL  98 82 90   CALCIUM mg/dL 8.2* 8.4* 7.8*   ANION GAP mmol/L 20 18 20   EGFR mL/min/1.73m*2 9* 15* 10*   PHOSPHORUS mg/dL 4.0 2.9 3.7     Results from last 72 hours   Lab Units 12/07/24  0604 12/06/24  0558 12/05/24  0615   ALBUMIN g/dL 3.3* 3.5 3.2*     Estimated Creatinine Clearance: 12.5 mL/min (A) (by C-G formula based on SCr of 5.46 mg/dL (H)).  C-Reactive Protein   Date Value Ref Range Status   12/03/2024 27.39 (H) <1.00 mg/dL Final     CRP   Date Value Ref Range Status   12/25/2022 9.90 (A) mg/dL Final     Comment:     REF VALUE  < 1.00     12/23/2022 23.46 (A) mg/dL Final     Comment:     REF VALUE  < 1.00       Microbiology  Susceptibility data from last 14 days.  Collected Specimen Info Organism Clindamycin Erythromycin Oxacillin Tetracycline Trimethoprim/Sulfamethoxazole Vancomycin   12/03/24 Swab from Anterior Nares Methicillin Susceptible Staphylococcus aureus (MSSA)         12/03/24 Blood culture from Dialysis Staphylococcus aureus         12/02/24 Blood culture from Peripheral Venipuncture Staphylococcus aureus         12/02/24 Blood culture from Peripheral Venipuncture Staphylococcus aureus         12/02/24 Blood culture from Peripheral Venipuncture Staphylococcus aureus         12/02/24 Blood culture from Peripheral Venipuncture Methicillin Resistant Staphylococcus aureus (MRSA)  S  S  R  S  S  S   Imaging  Reviewed       Assessment/Plan   Sepsis / bacteremia, likely dialysis line related, she has AVR / MVR, MRSA, the last positive BC 12/3, TTE without vegetations, LAURA with vegetation on the line   Atelectasis  Recommendations :  Continue  Ceftaroline / Daptomycin  Discussed with the medical team     I spent minutes in the professional and overall care of this patient.      Richard Gardiner MD

## 2024-12-07 NOTE — CARE PLAN
The patient's goals for the shift include      The clinical goals for the shift include will have decreased pain throughout shift      Problem: Pain - Adult  Goal: Verbalizes/displays adequate comfort level or baseline comfort level  Outcome: Progressing

## 2024-12-08 VITALS
BODY MASS INDEX: 25.49 KG/M2 | OXYGEN SATURATION: 96 % | DIASTOLIC BLOOD PRESSURE: 62 MMHG | HEART RATE: 64 BPM | WEIGHT: 158.6 LBS | TEMPERATURE: 98.3 F | SYSTOLIC BLOOD PRESSURE: 143 MMHG | HEIGHT: 66 IN | RESPIRATION RATE: 20 BRPM

## 2024-12-08 LAB
ALBUMIN SERPL BCP-MCNC: 3.2 G/DL (ref 3.4–5)
ANION GAP SERPL CALC-SCNC: 22 MMOL/L (ref 10–20)
BACTERIA BLD AEROBE CULT: ABNORMAL
BACTERIA BLD AEROBE CULT: ABNORMAL
BACTERIA BLD CULT: ABNORMAL
BACTERIA BLD CULT: ABNORMAL
BACTERIA BLD CULT: NORMAL
BASOPHILS # BLD AUTO: 0.03 X10*3/UL (ref 0–0.1)
BASOPHILS NFR BLD AUTO: 0.4 %
BUN SERPL-MCNC: 39 MG/DL (ref 6–23)
CALCIUM SERPL-MCNC: 7.8 MG/DL (ref 8.6–10.3)
CHLORIDE SERPL-SCNC: 97 MMOL/L (ref 98–107)
CO2 SERPL-SCNC: 21 MMOL/L (ref 21–32)
CREAT SERPL-MCNC: 6.89 MG/DL (ref 0.5–1.05)
EGFRCR SERPLBLD CKD-EPI 2021: 7 ML/MIN/1.73M*2
EOSINOPHIL # BLD AUTO: 0.66 X10*3/UL (ref 0–0.7)
EOSINOPHIL NFR BLD AUTO: 9.3 %
ERYTHROCYTE [DISTWIDTH] IN BLOOD BY AUTOMATED COUNT: 16.7 % (ref 11.5–14.5)
GLUCOSE SERPL-MCNC: 92 MG/DL (ref 74–99)
GRAM STN SPEC: ABNORMAL
GRAM STN SPEC: ABNORMAL
HCT VFR BLD AUTO: 40.6 % (ref 36–46)
HGB BLD-MCNC: 12.2 G/DL (ref 12–16)
IMM GRANULOCYTES # BLD AUTO: 0.04 X10*3/UL (ref 0–0.7)
IMM GRANULOCYTES NFR BLD AUTO: 0.6 % (ref 0–0.9)
LYMPHOCYTES # BLD AUTO: 1.01 X10*3/UL (ref 1.2–4.8)
LYMPHOCYTES NFR BLD AUTO: 14.2 %
MAGNESIUM SERPL-MCNC: 1.89 MG/DL (ref 1.6–2.4)
MCH RBC QN AUTO: 27.9 PG (ref 26–34)
MCHC RBC AUTO-ENTMCNC: 30 G/DL (ref 32–36)
MCV RBC AUTO: 93 FL (ref 80–100)
MONOCYTES # BLD AUTO: 0.48 X10*3/UL (ref 0.1–1)
MONOCYTES NFR BLD AUTO: 6.8 %
NEUTROPHILS # BLD AUTO: 4.88 X10*3/UL (ref 1.2–7.7)
NEUTROPHILS NFR BLD AUTO: 68.7 %
NRBC BLD-RTO: 0 /100 WBCS (ref 0–0)
PHOSPHATE SERPL-MCNC: 5 MG/DL (ref 2.5–4.9)
PLATELET # BLD AUTO: 199 X10*3/UL (ref 150–450)
POTASSIUM SERPL-SCNC: 4.4 MMOL/L (ref 3.5–5.3)
RBC # BLD AUTO: 4.38 X10*6/UL (ref 4–5.2)
SODIUM SERPL-SCNC: 136 MMOL/L (ref 136–145)
WBC # BLD AUTO: 7.1 X10*3/UL (ref 4.4–11.3)

## 2024-12-08 PROCEDURE — 2500000004 HC RX 250 GENERAL PHARMACY W/ HCPCS (ALT 636 FOR OP/ED): Mod: JZ | Performed by: INTERNAL MEDICINE

## 2024-12-08 PROCEDURE — 2500000001 HC RX 250 WO HCPCS SELF ADMINISTERED DRUGS (ALT 637 FOR MEDICARE OP): Performed by: INTERNAL MEDICINE

## 2024-12-08 PROCEDURE — 2500000004 HC RX 250 GENERAL PHARMACY W/ HCPCS (ALT 636 FOR OP/ED): Performed by: INTERNAL MEDICINE

## 2024-12-08 PROCEDURE — 2500000001 HC RX 250 WO HCPCS SELF ADMINISTERED DRUGS (ALT 637 FOR MEDICARE OP): Performed by: NURSE PRACTITIONER

## 2024-12-08 PROCEDURE — 36415 COLL VENOUS BLD VENIPUNCTURE: CPT | Performed by: INTERNAL MEDICINE

## 2024-12-08 PROCEDURE — 83735 ASSAY OF MAGNESIUM: CPT | Performed by: INTERNAL MEDICINE

## 2024-12-08 PROCEDURE — 99232 SBSQ HOSP IP/OBS MODERATE 35: CPT | Performed by: INTERNAL MEDICINE

## 2024-12-08 PROCEDURE — 85025 COMPLETE CBC W/AUTO DIFF WBC: CPT | Performed by: INTERNAL MEDICINE

## 2024-12-08 PROCEDURE — 87040 BLOOD CULTURE FOR BACTERIA: CPT | Mod: GEALAB | Performed by: INTERNAL MEDICINE

## 2024-12-08 PROCEDURE — 84100 ASSAY OF PHOSPHORUS: CPT | Performed by: INTERNAL MEDICINE

## 2024-12-08 PROCEDURE — 2060000001 HC INTERMEDIATE ICU ROOM DAILY

## 2024-12-08 PROCEDURE — 87075 CULTR BACTERIA EXCEPT BLOOD: CPT | Mod: GEALAB | Performed by: INTERNAL MEDICINE

## 2024-12-08 PROCEDURE — 94760 N-INVAS EAR/PLS OXIMETRY 1: CPT

## 2024-12-08 ASSESSMENT — COGNITIVE AND FUNCTIONAL STATUS - GENERAL
TURNING FROM BACK TO SIDE WHILE IN FLAT BAD: A LITTLE
PERSONAL GROOMING: A LITTLE
MOVING TO AND FROM BED TO CHAIR: A LITTLE
TOILETING: A LITTLE
DRESSING REGULAR LOWER BODY CLOTHING: A LITTLE
HELP NEEDED FOR BATHING: A LITTLE
MOBILITY SCORE: 18
STANDING UP FROM CHAIR USING ARMS: A LITTLE
MOVING FROM LYING ON BACK TO SITTING ON SIDE OF FLAT BED WITH BEDRAILS: A LITTLE
EATING MEALS: A LITTLE
CLIMB 3 TO 5 STEPS WITH RAILING: A LITTLE
WALKING IN HOSPITAL ROOM: A LITTLE
DRESSING REGULAR UPPER BODY CLOTHING: A LITTLE
DAILY ACTIVITIY SCORE: 18

## 2024-12-08 ASSESSMENT — PAIN SCALES - GENERAL
PAINLEVEL_OUTOF10: 10 - WORST POSSIBLE PAIN

## 2024-12-08 ASSESSMENT — PAIN DESCRIPTION - LOCATION: LOCATION: LEG

## 2024-12-08 NOTE — CARE PLAN
The patient's goals for the shift include      The clinical goals for the shift include patient will report tolerable pain level during shift    Over the shift, the patient did not make progress toward the following goals. Barriers to progression include patient complaining of 10/10 pain during shift. Continued to medicate. Recommendations to address these barriers include continue with plan of care.

## 2024-12-08 NOTE — CARE PLAN
The patient's goals for the shift include      The clinical goals for the shift include patient to report decreased pain throughout shift

## 2024-12-08 NOTE — PROGRESS NOTES
[facility] Hospitalist Progress Note       [id number]: Please page me for patient care issues.  [id number]: Please page night hospitalist for any issues.     Batsheva Page  :  1975(49 y.o.)  MRN:  [id number]  PCP: Zhou Pedersen MD      Principal Problem:    Sepsis (Multi)      Assessment and Plan:     Batsheva Page is a 49 y.o. 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 Patient presented to the ED due to AMS fever (102oF) and rigor. Patient's  last HD session was 24 and the dialysis center enoch blood cultures was positive for gram +ve cocci in clusters  In the ED were remarkable mainly for a wbc of 17.7. CXR was read as showing a developing right basal infiltrate.  She was given a dose each of vancomycin and zosyn and was transferred to [facility] for further care and management.     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  TTE (24) w/o e/o of endocarditis.   -s/p LAURA (24) w/o e/o endocarditis but positive for HC cath vegetation  -surgery to remove HD line removal under sedation on 24 2/2 severe pain from scarring 2/2 multiple prior HD line.  Hemodialysis is on hold.  Scheduled for hemodialysis catheter placement tomorrow, resuming hemodialysis as per nephrology.    #ESRD on HD (MWF)     #RLL infiltrate on CXR, asymptomatic    #Chronic pruritus requiring benadryl    #Acute metabolic encephalopathy, resolved    #Overweight BMI 25-29.9, Body mass index is 25.61 kg/m².      Disposition: Pending hemodialysis catheter placement tomorrow.  Repeat blood cultures results.  Possible discharge in next 1 to 2 days    DVT Prophylaxis: Subq Heparin    Code status: Full Code  Diet: Adult diet Regular, Renal;  Potassium Restricted 2 gm (50mEq); 2 - 3 grams Sodium    Level of MDM:  High    patient and family updated, I personally examined the patient, and I personally reviewed chart, data, labs radiology reports    Family Communication  Number :   Name of Designated Family Representative:       Total time spent: 35 minutes, of total time providing counseling or in coordination of care. Total time on this day of visit includes record and documentation review before and after visit including documentation and time not explicitly included on EMR time stamp      Subjective:   Interval History:  HPI  The patient complains of bacteremia  The patient feels their symptoms areunchanged  no events or new concerns    Scheduled Meds:acetaminophen, 975 mg, oral, q8h  aspirin, 81 mg, oral, Daily  atorvastatin, 40 mg, oral, Daily  calcitriol, 0.5 mcg, oral, Daily  ceftaroline, 200 mg, intravenous, q12h  daptomycin, 6 mg/kg (Adjusted), intravenous, q48h  diphenhydrAMINE, 12.5 mg, intravenous, Once  docusate sodium, 100 mg, oral, BID  ergocalciferol, 1,250 mcg, oral, Every Sunday  heparin, 1,900 Units, intra-catheter, After Dialysis  heparin, 1,900 Units, intra-catheter, After Dialysis  lactulose, 20 g, oral, TID  levETIRAcetam, 750 mg, oral, Nightly  metoprolol tartrate, 50 mg, oral, BID  pregabalin, 50 mg, oral, BID      Continuous Infusions:   PRN Meds:PRN medications: dextromethorphan-guaifenesin, diphenhydrAMINE, diphenhydrAMINE, diphenhydrAMINE, guaiFENesin, HYDROmorphone, hydrOXYzine HCL, oxyCODONE **OR** oxyCODONE, promethazine **OR** promethazine    Review of Systems   All other systems reviewed and are negative.    Interval Pertinent History:  Social History     Tobacco Use    Smoking status: Never    Smokeless tobacco: Never   Substance Use Topics    Alcohol use: Never         Objective:   Patient Vitals for the past 24 hrs:   BP Temp Temp src Pulse Resp SpO2   12/08/24 1024 (!) 141/95 36.7 °C (98.1 °F) Temporal 83 21 95 %    24 0834 163/83 -- -- 66 -- --   24 0500 148/54 36.2 °C (97.2 °F) Temporal 64 20 97 %   24 -- -- -- -- -- 97 %   24 1900 141/68 37 °C (98.6 °F) Temporal 69 20 99 %   24 1404 126/73 36.3 °C (97.3 °F) Temporal 64 20 95 %       Average, Min, and Max for last 24 hours Vitals:  TEMPERATURE:  Temp  Av.6 °C (97.8 °F)  Min: 36.2 °C (97.2 °F)  Max: 37 °C (98.6 °F)    RESPIRATIONS RANGE: Resp  Av.3  Min: 20  Max: 21    PULSE RANGE: Pulse  Av.2  Min: 64  Max: 83    BLOOD PRESSURE RANGE:  Systolic (24hrs), Av , Min:126 , Max:163   ; Diastolic (24hrs), Av, Min:54, Max:95      PULSE OXIMETRY RANGE: SpO2  Av.6 %  Min: 95 %  Max: 99 %  Body mass index is 25.61 kg/m².    I/O last 3 completed shifts:  In: 650 (9 mL/kg) [P.O.:650]  Out: 0 (0 mL/kg)   Weight: 71.9 kg   Weight change:      Physical Exam  Vitals and nursing note reviewed.   Constitutional:       Appearance: Normal appearance.   HENT:      Head: Normocephalic and atraumatic.      Right Ear: External ear normal.      Left Ear: External ear normal.      Nose: Nose normal.      Mouth/Throat:      Mouth: Mucous membranes are moist.   Eyes:      General: No scleral icterus.        Right eye: No discharge.         Left eye: No discharge.      Extraocular Movements: Extraocular movements intact.      Conjunctiva/sclera: Conjunctivae normal.      Pupils: Pupils are equal, round, and reactive to light.   Cardiovascular:      Rate and Rhythm: Normal rate and regular rhythm.      Heart sounds: Murmur heard.   Pulmonary:      Effort: Pulmonary effort is normal.      Breath sounds: Normal breath sounds.   Chest:      Comments: Right anterior chest wall dialysis cath in place  Abdominal:      General: Abdomen is flat. Bowel sounds are normal.      Palpations: Abdomen is soft.   Musculoskeletal:         General: Normal range of motion.      Right lower leg: No edema.      Left lower leg: No edema.   Skin:     General: Skin  is warm and dry.      Capillary Refill: Capillary refill takes less than 2 seconds.   Neurological:      General: No focal deficit present.   Psychiatric:         Mood and Affect: Mood normal.         Thought Content: Thought content normal.         Judgment: Judgment normal.         Lab Results   Component Value Date     12/08/2024    K 4.4 12/08/2024    CL 97 (L) 12/08/2024    CO2 21 12/08/2024    BUN 39 (H) 12/08/2024    CREATININE 6.89 (H) 12/08/2024    GLUCOSE 92 12/08/2024    CALCIUM 7.8 (L) 12/08/2024    PROT 8.5 (H) 12/02/2024    BILITOT 0.7 12/02/2024    ALKPHOS 106 12/02/2024    AST 18 12/02/2024    ALT 8 12/02/2024    GLOB 4.1 (H) 09/21/2023       Lab Results   Component Value Date    WBC 7.1 12/08/2024    HGB 12.2 12/08/2024    HCT 40.6 12/08/2024    MCV 93 12/08/2024     12/08/2024    LYMPHOPCT 14.2 12/08/2024    RBC 4.38 12/08/2024    MCH 27.9 12/08/2024    MCHC 30.0 (L) 12/08/2024    RDW 16.7 (H) 12/08/2024    CRP 27.39 (H) 12/03/2024       Lab Results   Component Value Date    TSH 2.12 04/28/2024     Lab Results   Component Value Date    LACTATE 1.4 12/02/2024    DDIMER 3.81 (H) 10/26/2022    INR 1.4 (H) 04/28/2024       IMAGES:  Encounter Date: 12/03/24   ECG 12 lead   Result Value    Ventricular Rate 92    Atrial Rate 92    VA Interval 144    QRS Duration 74    QT Interval 338    QTC Calculation(Bazett) 417    P Axis 51    R Axis -8    T Axis 53    QRS Count 15    Q Onset 221    T Offset 390    QTC Fredericia 389    Narrative    Sinus rhythm with Premature atrial complexes  Possible Inferior infarct , age undetermined  Cannot rule out Anterior infarct , age undetermined  Abnormal ECG  When compared with ECG of 02-DEC-2024 14:59, (unconfirmed)  Premature atrial complexes are now Present  Borderline criteria for Inferior infarct are now Present        Transesophageal Echo (LAURA)    Result Date: 12/6/2024   [facility], [address] [address]               Tel  [phone] and Fax [phone] TRANSESOPHAGEAL ECHOCARDIOGRAM REPORT  Patient Name:       RITA PABLO          Reading Physician:    [id number] Wilhelm MD Study Date:         12/6/2024           Ordering Provider:    Eden WILHELM MRN/PID:            [id number]            Fellow: Accession#:         [id number]        Nurse:                Luis Taylor RN Date of Birth/Age:  1975 / 49     Sonographer:          Hawa hirsch                                     RDCS Gender assigned at  F                   Additional Staff: Birth: Height:             167.64 cm           Admit Date:           12/3/2024 Weight:             73.94 kg            Admission Status:     Inpatient -                                                               Routine BSA / BMI:          1.83 m2 / 26.31     Encounter#:           [id number]                     kg/m2 Blood Pressure:     162/97 mmHg         Department Location:  [facility]                                                               Lab Study Type:    TRANSESOPHAGEAL ECHO (ALURA) Diagnosis/ICD: Bacteremia-R78.81 Indication:    Bacteremia CPT Code:      LAURA Complete-50583; Doppler Limited-93759; Color Doppler-75273;                Echocardiography, LAURA add on 3D post processing-95044; Moderate                Sedation Services initial 15 minutes patient >5 years-04066;                Moderate Sedation Services 1st additional 15 minutes patient >5                years-86046 Patient History: CABG:              Mulitivessel CABG. Valve Disorders:   Tricuspid Valve Repair. Pertinent History: HTN. Study Detail: The following Echo studies were performed: 2D, Doppler, color flow               and 3D.  PHYSICIAN INTERPRETATION: LAURA Details: The LAURA probe used was Elizabeth X8. Technically adequate omniplane  transesophageal echocardiogram performed. Color flow Doppler echo was performed to assess for the presence of a patent foramen ovale. LAURA Medication: The pharynx was anesthetized with Cetacaine spray and viscous lidocaine. The patient was sedated using moderate sedation. Total intraservice time for moderate sedation was 15 minutes. Midazolam and Fentanyl was used to sedate the patient for this exam. LAURA Procedure: The probe was passed without difficulty. Complications encountered during procedure: Patient tolerated the procedure well without any apparent complications. Left Ventricle: The left ventricular systolic function is normal, with a visually estimated ejection fraction of 65-70%. The left ventricular cavity size was not assessed. Left ventricular diastolic filling was not assessed. Left Atrium: The left atrium is normal in size. There is no thrombus visualized in the left atrial appendage. Interatrial septum is surgically closed. Right Ventricle: The right ventricle was not well visualized. Right ventricular systolic function not assessed. Right Atrium: The right atrium is normal in size. There is a mobile small right atrial mass. Mass seen attached to the SVC catheter, concerning for infection. There is a device seen from the superior vena cava into the right atrium at the superior vena cava and right atrial junction. Aortic Valve: There is a prosthetic aortic valve present. There is Inspirus bioprosthetic type aortic valve bioprosthesis with a 21 mm reported size. Echo findings are consistent with normal aortic valve prosthesis structure and function. There is no evidence of aortic valve regurgitation. No perivalvular leak. No vegetation. Mitral Valve: There is a prosthetic mitral valve present. There is a St. Devonte bioprosthetic type mitral valve prosthesis with a 27 mm reported size. Echo findings are consistent with normal mitral valve prosthesis structure and function. There is trace mitral valve  regurgitation. No perivalvular leak. No vegetation. Tricuspid Valve: Status post tricuspid valve repair. There is mild tricuspid regurgitation. The Doppler estimated RVSP is within normal limits at 19.0 mmHg. No perivalvular leak. Pulmonic Valve: The pulmonic valve is structurally normal. There is physiologic pulmonic valve regurgitation. Pericardium: There is no pericardial effusion noted. Aorta: The aortic root is normal. There is plaque visualized in the descending aorta which is classified as a Grade 2 [mild (focal or diffuse) intimal thickening of 2-3 mm] atherosclerosis. Pulmonary Veins: The pulmonary veins appear normal and return normally to the left atrium.  CONCLUSIONS:  1. The left ventricular systolic function is normal, with a visually estimated ejection fraction of 65-70%.  2. There is a St. Devonte bioprosthetic type mitral valve prosthesis with a 27 mm reported size.  3. Status post tricuspid valve repair.  4. Right ventricular systolic pressure is within normal limits.  5. There is Inspirus bioprosthetic type aortic valve bioprosthesis with a 21 mm reported size.  6. Echo findings are consistent with normal aortic valve prosthesis structure and function.  7. Echo findings are consistent with normal mitral valve prosthesis structure and function.  8. Mass seen attached to the SVC catheter, concerning for infection.  9. There is plaque visualized in the descending aorta. QUANTITATIVE DATA SUMMARY:  LV SYSTOLIC FUNCTION BY 2D PLANIMETRY (MOD):                      Normal Ranges: EF-Visual:      68 % LV EF Reported: 68 %  TRICUSPID VALVE/RVSP:          Normal Ranges: Peak TR Velocity:     2.00 m/s RV Syst Pressure:     19 mmHg  (< 30mmHg)  [id number] Lyndsey Moncada MD Electronically signed on 12/6/2024 at 12:40:24 PM  ** Final (Updated) **     Transthoracic Echo (TTE) Complete    Result Date: 12/4/2024   [facility], [address] [address]               Tel [phone] and Fax  [phone] TRANSTHORACIC ECHOCARDIOGRAM REPORT  Patient Name:       RITA PABLO          Reading Physician:    [id number] Lyndsey Moncada MD Study Date:         12/4/2024           Ordering Provider:    [id number] VICENTE FRANCO MRN/PID:            [id number]            Fellow: Accession#:         [id number]        Nurse: Date of Birth/Age:  1975 / 49     Sonographer:          Yun hirsch                                     RDANJANA Gender assigned at  F                   Additional Staff: Birth: Height:             167.64 cm           Admit Date:           12/3/2024 Weight:             71.21 kg            Admission Status:     Inpatient -                                                               Routine BSA / BMI:          1.80 m2 / 25.34     Encounter#:           [id number]                     kg/m2 Blood Pressure:     145/74 mmHg         Department Location:  [facility]                                                               Invasive Study Type:    TRANSTHORACIC ECHO (TTE) COMPLETE Diagnosis/ICD: 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 Pertinent History: Fever, CAD and ESRD,AV= Inspiris 21mm, MV= EPIC St. Devonte                    27mm. Study Detail: The following Echo studies were performed: 2D, M-Mode, Doppler and               color flow. Patient has a pacemaker.               The patient was awake.  PHYSICIAN INTERPRETATION: Left  Ventricle: The left ventricular systolic function is normal, with a visually estimated ejection fraction of 65-70%. The left ventricular cavity size is normal. There is mildly increased septal and mildly increased posterior left ventricular wall thickness. There is left ventricular concentric remodeling. Abnormal (paradoxical) septal motion consistent with post-operative status. Left ventricular diastolic filling cannot be determined, due to mitral valve repair/replacement. Left Atrium: The left atrium was not well visualized. Right Ventricle: The right ventricle is mildly enlarged. There is normal right ventricular global systolic function. RV free wall is not well visualized. Right Atrium: The right atrium is normal in size. Aortic Valve: There is a prosthetic aortic valve present. The aortic valve dimensionless index is 0.46. There is Inspiris bioprosthetic type aortic valve bioprosthesis with a 21 mm reported size. Echo findings are consistent with normal aortic valve prosthesis structure and function. There is no evidence of aortic valve regurgitation. The peak instantaneous gradient of the aortic valve is 33 mmHg. The mean gradient of the aortic valve is 18 mmHg. The valve is not well visualized in the short axis views. No obvious vegetation. Mitral Valve: There is a prosthetic mitral valve present. There is a St. Devonte bioprosthetic type mitral valve prosthesis with a 27 mm reported size. The peak and mean gradients are 13 mmHg and 6 mmHg respectively. The peak instantaneous gradient of the mitral valve is 13 mmHg. Echo findings are consistent with normal mitral valve prosthesis structure and function. There is trace mitral valve regurgitation. No obvious vegetation. Tricuspid Valve: Status post tricuspid valve repair. There is trace to mild tricuspid regurgitation. The Doppler estimated RVSP is mildly elevated at 35.5 mmHg. Mean gradient is 2mmHg. No tricuspid stenosis. No obvious vegetation. Pulmonic Valve:  The pulmonic valve is not well visualized. There is physiologic pulmonic valve regurgitation. Pericardium: There is no pericardial effusion noted. Aorta: The aortic root is normal. Systemic Veins: The inferior vena cava appears small in size, with IVC inspiratory collapse greater than 50%.  CONCLUSIONS:  1. Poorly visualized anatomical structures due to suboptimal image quality.  2. The left ventricular systolic function is normal, with a visually estimated ejection fraction of 65-70%.  3. Abnormal septal motion consistent with post-operative status.  4. There is normal right ventricular global systolic function.  5. Mildly enlarged right ventricle.  6. There is a St. Devonte bioprosthetic type mitral valve prosthesis with a 27 mm reported size. The peak and mean gradients are 13 mmHg and 6 mmHg respectively.  7. Status post tricuspid valve repair.  8. Mildly elevated right ventricular systolic pressure.  9. There is Inspiris bioprosthetic type aortic valve bioprosthesis with a 21 mm reported size. 10. Echo findings are consistent with normal aortic valve prosthesis structure and function. 11. Echo findings are consistent with normal mitral valve prosthesis structure and function. QUANTITATIVE DATA SUMMARY:  2D MEASUREMENTS:          Normal Ranges: IVSd:            0.98 cm  (0.6-1.1cm) LVPWd:           1.02 cm  (0.6-1.1cm) LVIDd:           3.23 cm  (3.9-5.9cm) LVIDs:           2.33 cm LV Mass Index:   51 g/m2 LVEDV Index:     41 ml/m2 LV % FS          27.9 %  LA VOLUME:                    Normal Ranges: LA Vol A4C:        32.3 ml    (22+/-6mL/m2) LA Vol A2C:        25.1 ml LA Vol BP:         31.8 ml LA Vol Index A4C:  17.9 ml/m2 LA Vol Index A2C:  13.9 ml/m2 LA Vol Index BP:   17.6 ml/m2 LA Area A4C:       13.5 cm2 LA Area A2C:       10.7 cm2 LA Major Axis A4C: 4.8 cm LA Major Axis A2C: 3.8 cm LA Vol A4C:        27.3 ml LA Vol A2C:        22.0 ml LA Vol Index BSA:  13.7 ml/m2  RA VOLUME BY A/L METHOD:          Normal  Ranges: RA Area A4C:             16.4 cm2  AORTA MEASUREMENTS:         Normal Ranges: Ao Sinus, d:        2.00 cm (2.1-3.5cm) Asc Ao, d:          2.60 cm (2.1-3.4cm)  LV SYSTOLIC FUNCTION BY 2D PLANIMETRY (MOD):                      Normal Ranges: EF-A4C View:    62 % (>=55%) EF-A2C View:    72 % EF-Biplane:     67 % EF-Visual:      68 % LV EF Reported: 68 %  LV DIASTOLIC FUNCTION:             Normal Ranges: MV Peak E:             1.92 m/s    (0.7-1.2 m/s) MV Peak A:             1.01 m/s    (0.42-0.7 m/s) E/A Ratio:             1.90        (1.0-2.2) MV e'                  0.060 m/s   (>8.0) MV lateral e'          0.06 m/s MV medial e'           0.06 m/s MV A Dur:              117.03 msec E/e' Ratio:            32.04       (<8.0)  MITRAL VALVE:           Normal Ranges: MV Vmax:      1.79 m/s  (<=1.3m/s) MV peak P.9 mmHg (<5mmHg) MV mean P.8 mmHg  (<2mmHg) MV VTI:       49.37 cm  (10-13cm) MV DT:        238 msec  (150-240msec)  AORTIC VALVE:                      Normal Ranges: AoV Vmax:                2.87 m/s  (<=1.7m/s) AoV Peak P.8 mmHg (<20mmHg) AoV Mean P.2 mmHg (1.7-11.5mmHg) LVOT Max Jamarcus:            1.18 m/s  (<=1.1m/s) AoV VTI:                 53.97 cm  (18-25cm) LVOT VTI:                24.65 cm LVOT Diameter:           1.76 cm   (1.8-2.4cm) AoV Area, VTI:           1.11 cm2  (2.5-5.5cm2) AoV Area,Vmax:           1.00 cm2  (2.5-4.5cm2) AoV Dimensionless Index: 0.46  RIGHT VENTRICLE: RV Basal 4.20 cm RV Mid   3.40 cm RV Major 8.7 cm TAPSE:   13.0 mm RV s'    0.10 m/s  TRICUSPID VALVE/RVSP:          Normal Ranges: Peak TR Velocity:     2.85 m/s RV Syst Pressure:     36 mmHg  (< 30mmHg) IVC Diam:             1.00 cm  PULMONIC VALVE:          Normal Ranges: PV Max Jamarcus:     1.0 m/s  (0.6-0.9m/s) PV Max PG:      3.8 mmHg  AORTA: Asc Ao Diam 2.55 cm  [id number] Lyndsey Moncada MD Electronically signed on 2024 at 12:54:13 PM  ** Final **     Transesophageal Echo  (LAURA)    Result Date: 5/1/2024           [facility] [address] [place], [place] [id number]            Phone [phone] TRANSESOPHAGEAL ECHOCARDIOGRAM REPORT  Patient Name:     RITA PABLO MAVERICK Reading Physician:   [id number] Flores DO Study Date:       5/1/2024             Ordering Provider:   [id number] POLLY FLORES MRN/PID:          [id number]             Fellow: Accession#:       [id number]         Nurse: Date of           1975 / 48      Sonographer:         Dionte Vasquez RDCS Birth/Age:        years Gender:           F                    Additional Staff: Height:           172.00 cm            Admit Date: Weight:           70.31 kg             Admission Status:    Inpatient - Routine BSA / BMI:        1.83 m2 / 23.77      Department Location: [facility]                   kg/m2 Blood Pressure: 153 /72 mmHg Study Type:    TRANSESOPHAGEAL ECHO (LAURA) Diagnosis/ICD: Viral endocarditis-B33.21 Indication:    Endocarditis CPT Codes:     LAURA Complete-61404  Study Detail: The following Echo studies were performed: 2D, M-Mode and color               flow.  PHYSICIAN INTERPRETATION: LAURA Details: The LAURA probe used was [id number]. Technically adequate omniplane transesophageal echocardiogram performed. LAURA Medication: The patient was sedated by Anesthesia; please refer to anesthesia flow sheet for medications used. LAURA Procedure: The probe was passed without difficulty. The following complication was encountered during the procedure: Patient tolerated the procedure well without any apparent complications. Left Ventricle: Left ventricular systolic function is normal. There are no regional wall motion abnormalities. The left ventricular cavity size is normal. Left ventricular diastolic filling was indeterminate. Left Atrium: The left atrium is normal in size. There is a normal sized left atrial appendage, there is no thrombus visualized in  the left atrial appendage and the left atrial appendage Doppler velocities are normal. Right Ventricle: The right ventricle is normal in size. There is normal right ventricular global systolic function. Right Atrium: The right atrium was not well visualized. Aortic Valve: There is a prosthetic aortic valve present. Echo findings are consistent with normal aortic valve prosthesis structure and function. There is no evidence of aortic valve regurgitation. Mitral Valve: There is a prosthetic mitral valve present. Echo findings are consistent with normal mitral valve prosthesis structure and function. There is trace mitral valve regurgitation. Trace prosthetic mitral regurgitation. Mobile echodensity previously seen is not visualized. Tricuspid Valve: The tricuspid valve was not well visualized. Tricuspid regurgitation was not assessed. Pulmonic Valve: The pulmonic valve is not well visualized. The pulmonic valve regurgitation was not well visualized. Pericardium: There is no pericardial effusion noted. Aorta: The aortic root is normal.  CONCLUSIONS:  1. Left ventricular systolic function is normal. QUANTITATIVE DATA SUMMARY:  [id number] Fabian Skaggs DO Electronically signed on 5/1/2024 at 3:47:59 PM  ** Final **     Transthoracic Echo (TTE) Complete    Result Date: 4/29/2024           [place] [id number]            Phone [phone] TRANSTHORACIC ECHOCARDIOGRAM REPORT  Patient Name:      RITA SAMY TEMPLE  Reading Physician:    [id number] Fabian Skaggs DO Study Date:        4/29/2024             Ordering Provider:    [id number] LIZAMA MRN/PID:           [id number]              Fellow: Accession#:        [id number]          Nurse: Date of Birth/Age: 1975 / 48 years Sonographer:          Dionte Vasquez                                                                 [facility] Gender:            F                     Additional Staff: Height:            170.00 cm             Admit Date: Weight:            69.00 kg              Admission Status:     Inpatient -                                                                Routine BSA / BMI:         1.80 m2 / 23.88 kg/m2 Department Location:  [facility] ICU Blood Pressure: 144 /36 mmHg Study Type:    TRANSTHORACIC ECHO (TTE) COMPLETE Diagnosis/ICD: Endocarditis, valve unspecified-I38 Indication:    Endocarditis CPT Codes:     Echo Complete w Full Doppler-[id number] Patient History: Pertinent History: CAD, Chest Pain, CHF, HTN and Hyperlipidemia. Pacemaker,                    ESRD, MRSA, TVr-2013/Ring, MVR-2022/#27 [facility] [employer], AVR-2022/#21                    Insp[employer]. Study Detail: The following Echo studies were performed: 2D, M-Mode, Doppler and               color flow. Technically challenging study due to poor acoustic               windows and patient lying in supine position.  PHYSICIAN INTERPRETATION: Left Ventricle: Left ventricular systolic function is normal, with an estimated ejection fraction of 65-70%. There are no regional wall motion abnormalities. The left ventricular cavity size is normal. Spectral Doppler shows an impaired relaxation pattern of left ventricular diastolic filling. Left Atrium: The left atrium is normal in size. Right Ventricle: The right ventricle is normal in size. There is normal right ventricular global systolic function. Right Atrium: The right atrium is normal in size. Aortic Valve: There is a prosthetic aortic valve present. There is bioprosthetic aortic valve. Echo findings are consistent with normal aortic valve prosthesis structure and function. There is no evidence of aortic valve regurgitation. The peak instantaneous gradient of the aortic valve is 50.7 mmHg. The mean gradient of the aortic valve is 27.0 mmHg. Mitral Valve: There is a prosthetic mitral valve present. There is a  normally functioning mitral valve prosthesis. There is no evidence of mitral valve regurgitation. Tricuspid Valve: The tricuspid valve is structurally normal. There is trace tricuspid regurgitation. Pulmonic Valve: The pulmonic valve is not well visualized. The pulmonic valve regurgitation was not well visualized. Pericardium: There is no pericardial effusion noted. Aorta: The aortic root is normal.  CONCLUSIONS:  1. Left ventricular systolic function is normal with a 65-70% estimated ejection fraction.  2. Spectral Doppler shows an impaired relaxation pattern of left ventricular diastolic filling.  3. There is a normally functioning mitral valve prosthesis.  4. There is a bioprosthetic aortic valve. QUANTITATIVE DATA SUMMARY: 2D MEASUREMENTS:                          Normal Ranges: LAs:           3.30 cm   (2.7-4.0cm) IVSd:          0.99 cm   (0.6-1.1cm) LVPWd:         0.96 cm   (0.6-1.1cm) LVIDd:         3.93 cm   (3.9-5.9cm) LVIDs:         2.52 cm LV Mass Index: 66.4 g/m2 LV % FS        35.9 % LA VOLUME:                               Normal Ranges: LA Vol A4C:        53.4 ml    (22+/-6mL/m2) LA Vol A2C:        38.7 ml LA Vol BP:         47.0 ml LA Vol Index A4C:  29.7ml/m2 LA Vol Index A2C:  21.5 ml/m2 LA Vol Index BP:   26.2 ml/m2 LA Area A4C:       17.0 cm2 LA Area A2C:       14.0 cm2 LA Major Axis A4C: 4.6 cm LA Major Axis A2C: 4.3 cm LA Volume Index:   25.0 ml/m2 LA Vol A4C:        49.0 ml LA Vol A2C:        37.0 ml RA VOLUME BY A/L METHOD:                       Normal Ranges: RA Area A4C: 16.0 cm2 LV SYSTOLIC FUNCTION BY 2D PLANIMETRY (MOD):                     Normal Ranges: EF-A4C View: 59.6 % (>=55%) EF-A2C View: 65.1 % EF-Biplane:  61.8 % LV DIASTOLIC FUNCTION:                     Normal Ranges: MV Peak E: 1.56 m/s (0.7-1.2 m/s) MV Peak A: 1.58 m/s (0.42-0.7 m/s) E/A Ratio: 0.99     (1.0-2.2) MITRAL VALVE:                       Normal Ranges: MV Vmax:    1.66 m/s  (<=1.3m/s) MV peak P.0 mmHg  (<5mmHg) MV mean P.0 mmHg  (<48mmHg) MV DT:      254 msec  (150-240msec) AORTIC VALVE:                                    Normal Ranges: AoV Vmax:                3.56 m/s  (<=1.7m/s) AoV Peak P.7 mmHg (<20mmHg) AoV Mean P.0 mmHg (1.7-11.5mmHg) LVOT Max Jamarcus:            1.41 m/s  (<=1.1m/s) AoV VTI:                 64.40 cm  (18-25cm) LVOT VTI:                30.60 cm AoV Dimensionless Index: 0.48  RIGHT VENTRICLE: RV Basal 3.39 cm RV Mid   2.40 cm RV Major 6.2 cm TAPSE:   17.5 mm RV s'    0.12 m/s TRICUSPID VALVE/RVSP:                             Normal Ranges: Peak TR Velocity: 2.58 m/s RV Syst Pressure: 29.6 mmHg (< 30mmHg) IVC Diam:         1.41 cm PULMONIC VALVE:                         Normal Ranges: PV Accel Time: 92 msec  (>120ms) PV Max Jamarcus:    1.2 m/s  (0.6-0.9m/s) PV Max P.9 mmHg  Magdaleno Flores DO Electronically signed on 2024 at 2:07:09 PM  ** Final **     Transesophageal Echo (LAURA)    Result Date: 2024           [place] [address]            Phone [phone] TRANSESOPHAGEAL ECHOCARDIOGRAM REPORT  Patient Name:     RITA SAMY TEMPLE Reading Physician:   [id number] Flores DO Study Date:       2024            Ordering Provider:   [id number] FLORES MRN/PID:          [id number]             Fellow: Accession#:       [id number]         Nurse: Date of           1975      Sonographer:         Dionte Vasquez RDCS Birth/Age:        years Gender:           F                    Additional Staff: Height:           165.00 cm            Admit Date: Weight:           65.00 kg             Admission Status:    Inpatient - Routine BSA:              1.72 m2              Department Location: [facility] Blood Pressure: 142 /50 mmHg Study Type:    TRANSESOPHAGEAL ECHO (LAURA) Diagnosis/ICD: 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 ESRD, Pacemaker, s/p                    Redo-sternotomy with TVr-ring repair, AVR #21 Inspirus, MVR                    #27 St [employer]onte Epic Bioprosthesis. Study Detail: The following Echo studies were performed: 2D, color flow and               Doppler.  PHYSICIAN INTERPRETATION: LAURA Details: The LAURA probe used was [id number]. Technically adequate omniplane transesophageal echocardiogram performed. Color flow Doppler echo was performed to assess for the presence of a patent foramen ovale. LAURA Medication: The patient was sedated by Anesthesia; please refer to anesthesia flow sheet for medications used. LAURA Procedure: The probe was passed without difficulty. Left Ventricle: Left ventricular systolic function is normal. There are no regional wall motion abnormalities. The left ventricular cavity size is normal. Left ventricular diastolic filling was not assessed. Left Atrium: The left atrium is normal in size. There is a normal sized left atrial appendage, the left atrial appendage Doppler velocities are normal and there is no thrombus visualized in the left atrial appendage. Right Ventricle: The right ventricle is normal in size. There is normal right ventricular global systolic function. Right Atrium: The right atrium is normal in size. Aortic Valve: There is no visualized aortic valve vegetation. There is a prosthetic aortic valve present. There is bioprosthetic aortic valve. There is no evidence of aortic valve regurgitation. Mitral Valve: There is a prosthetic mitral valve present. There is a mitral valve bioprosthesis. There is trace mitral valve regurgitation. There is a mobile echodensity at the base of the anterior leaflet suspicious for vegetation, decreased in size in comparison to 9/2023 study. There is a small perforation of this leaflet associated but overall the valve integrity is fairly well  preserved. Tricuspid Valve: The tricuspid valve is structurally normal. There is trace tricuspid regurgitation. Pulmonic Valve: The pulmonic valve was not assessed. The pulmonic valve regurgitation was not assessed. Pericardium: There is no pericardial effusion noted. Aorta: The aortic root is normal.  CONCLUSIONS:  1. Left ventricular systolic function is normal.  2. There is a mobile echodensity at the base of the anterior leaflet suspicious for vegetation, decreased in size in comparison to 9/2023 study. There is a small perforation of this leaflet associated but overall the valve integrity is fairly well preserved.  3. No aortic valve vegetation visualized.  4. There is a bioprosthetic aortic valve. QUANTITATIVE DATA SUMMARY:  [id number] Fabian Skaggs DO Electronically signed on 1/12/2024 at 1:32:47 PM  ** Final **     Transthoracic Echo (TTE) Complete    Result Date: 1/11/2024           [place] [address]            Phone [phone] TRANSTHORACIC ECHOCARDIOGRAM REPORT  Patient Name:     RITA SAMY TEMPLE Reading Physician:   [id number] Fabian Skaggs DO Study Date:       1/10/2024            Ordering Provider:   [id number] MASSIEL NARANJO MRN/PID:          [id number]             Fellow: Accession#:       [id number]         Nurse: Date of           1975 / 48      Sonographer:         Lety Ivy RDCS Birth/Age:        years Gender:           F                    Additional Staff: Height:           165.10 cm            Admit Date: Weight:           63.96 kg             Admission Status:    Inpatient - Routine BSA:              1.71 m2              Department Location: [facility] Blood Pressure: 110 /36 mmHg Study Type:    TRANSTHORACIC ECHO (TTE) COMPLETE Diagnosis/ICD: Presence of prosthetic heart valve-Z95.2 Indication:    bacteremia CPT Codes:     Echo Complete w Full Doppler-[id number] Patient History:  Pacer/Defib:       Permanent pacemaker Pertinent History: HTN. s/p MVR, s/p AVR,bacteremia, PAF,ESRD, Anemia,                    CABG,endocarditis. Study Detail: The following Echo studies were performed: 2D, M-Mode, color flow               and Doppler. Technically challenging study due to dialysis cath lt               parasternal.  PHYSICIAN INTERPRETATION: Left Ventricle: Left ventricular systolic function is normal, with an estimated ejection fraction of 60-65%. There are no regional wall motion abnormalities. The left ventricular cavity size is normal. Spectral Doppler shows a normal pattern of left ventricular diastolic filling. Left Atrium: The left atrium is normal in size. Right Ventricle: The right ventricle is normal in size. There is normal right ventricular global systolic function. Right Atrium: The right atrium is normal in size. Aortic Valve: There is no visualized aortic valve vegetation. There is a prosthetic aortic valve present. There is bioprosthetic aortic valve. There is no evidence of aortic valve regurgitation. The peak instantaneous gradient of the aortic valve is 33.2 mmHg. The mean gradient of the aortic valve is 19.0 mmHg. Mitral Valve: There is a prosthetic mitral valve present. There is a normally functioning mitral valve prosthesis. There was no mitral valve vegetation visualized. There is trace mitral valve regurgitation. Tricuspid Valve: The tricuspid valve is structurally normal. There is trace tricuspid regurgitation. Pulmonic Valve: The pulmonic valve is not well visualized. The pulmonic valve regurgitation was not well visualized. Pericardium: There is no pericardial effusion noted. Aorta: The aortic root is normal.  CONCLUSIONS:  1. Left ventricular systolic function is normal with a 60-65% estimated ejection fraction.  2. No mitral valve vegetation visualized.  3. There is a normally functioning mitral valve prosthesis.  4. No aortic valve vegetation visualized.  5. There is  a bioprosthetic aortic valve. QUANTITATIVE DATA SUMMARY: 2D MEASUREMENTS:                          Normal Ranges: IVSd:          0.85 cm   (0.6-1.1cm) LVPWd:         0.72 cm   (0.6-1.1cm) LVIDd:         3.87 cm   (3.9-5.9cm) LV Mass Index: 50.5 g/m2 LV SYSTOLIC FUNCTION BY 2D PLANIMETRY (MOD):                     Normal Ranges: EF-A4C View: 60.9 % (>=55%) EF-A2C View: 65.0 % EF-Biplane:  62.9 % LV DIASTOLIC FUNCTION:                     Normal Ranges: MV Peak E: 1.87 m/s (0.7-1.2 m/s) MV Peak A: 1.01 m/s (0.42-0.7 m/s) E/A Ratio: 1.85     (1.0-2.2) MITRAL VALVE:                       Normal Ranges: MV Vmax:    1.78 m/s  (<=1.3m/s) MV peak P.7 mmHg (<5mmHg) MV mean P.0 mmHg  (<48mmHg) MV DT:      320 msec  (150-240msec) AORTIC VALVE:                                    Normal Ranges: AoV Vmax:                2.88 m/s  (<=1.7m/s) AoV Peak P.2 mmHg (<20mmHg) AoV Mean P.0 mmHg (1.7-11.5mmHg) LVOT Max Jamarcus:            1.12 m/s  (<=1.1m/s) AoV VTI:                 62.50 cm  (18-25cm) LVOT VTI:                21.00 cm LVOT Diameter:           2.00 cm   (1.8-2.4cm) AoV Area, VTI:           1.06 cm2  (2.5-5.5cm2) AoV Area,Vmax:           1.22 cm2  (2.5-4.5cm2) AoV Dimensionless Index: 0.34 TRICUSPID VALVE/RVSP:                             Normal Ranges: Peak TR Velocity: 2.84 m/s RV Syst Pressure: 35.3 mmHg (< 30mmHg) IVC Diam:         1.28 cm  [id number] Fabian Skaggs DO Electronically signed on 2024 at 11:38:48 AM  ** Final **    === 24 ===    XR CHEST 1 VIEW    - Impression -  1.  Developing right basal infiltrate. See discussion above. Possible  developing retrocardiac left basal infiltrate.        MACRO:  None    Signed by: Joseph Schoenberger 2024 3:59 PM  Dictation workstation:   [id number]  === 24 ===    CT ABDOMEN PELVIS WO IV CONTRAST    - Impression -  1.  Large amount of formed stool throughout the colon without wall  thickening or inflammatory change  suggestive of constipation.  Clinical correlation recommended.  2. Nonspecific gallbladder distention without radiopaque calculi.  Unchanged mild splenomegaly. Correlation with liver enzymes is  recommended.  3. Unchanged 4.4 x 3.9 cm right ovarian cyst. Follow-up outpatient  ultrasound recommended.      Signed by: Harinder De La Rosa 12/2/2024 8:03 PM  Dictation workstation:   [id number]  === 12/02/24 ===    XR CHEST 1 VIEW    - Impression -  1.  Developing right basal infiltrate. See discussion above. Possible  developing retrocardiac left basal infiltrate.        MACRO:  None    Signed by: Joseph Schoenberger 12/2/2024 3:59 PM  Dictation workstation:   [id number]      Additional results of the last 24 hours have been reviewed.    Dictated using [employer] Version 2.4  Proof read however unrecognized voice recognition errors may have occurred     Electronically signed by David Oconnor MD on 12/08/24 at 1:10 PM

## 2024-12-08 NOTE — PROGRESS NOTES
[facility] Hospitalist Progress Note       [id number]: Please page me for patient care issues.  [phone]: Please page night hospitalist for any issues.     Batsheva Page  :  1975(49 y.o.)  MRN:  [id number]  PCP: Zhou Pedersen MD      Principal Problem:    Sepsis (Multi)      Assessment and Plan:     Batsheva Page is a 49 y.o. 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 Patient presented to the ED due to AMS fever (102oF) and rigor. Patient's  last HD session was 24 and the dialysis center enoch blood cultures was positive for gram +ve cocci in clusters  In the ED were remarkable mainly for a wbc of 17.7. CXR was read as showing a developing right basal infiltrate.  She was given a dose each of vancomycin and zosyn and was transferred to [facility] for further care and management.     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  TTE (24) w/o e/o of endocarditis.   -s/p LAURA (24) w/o e/o endocarditis but positive for HC cath vegetation  -surgery to remove HD line removal under sedation on 24 2/2 severe pain from scarring 2/2 multiple prior HD line.  Hemodialysis is on hold.  Scheduled for hemodialysis catheter placement tomorrow, resuming hemodialysis as per nephrology.    #ESRD on HD (MWF)     #RLL infiltrate on CXR, asymptomatic    #Chronic pruritus requiring benadryl    #Acute metabolic encephalopathy, resolved    #Overweight BMI 25-29.9, Body mass index is 25.61 kg/m².      Disposition: Pending hemodialysis catheter placement tomorrow.  Repeat blood cultures results.  Possible discharge in next 1 to 2 days    DVT Prophylaxis: Subq Heparin    Code status: Full Code  Diet: Adult diet Regular, Renal;  Potassium Restricted 2 gm (50mEq); 2 - 3 grams Sodium    Level of MDM:  High    patient and family updated, I personally examined the patient, and I personally reviewed chart, data, labs radiology reports    Family Communication  Number :   Name of Designated Family Representative:       Total time spent: 35 minutes, of total time providing counseling or in coordination of care. Total time on this day of visit includes record and documentation review before and after visit including documentation and time not explicitly included on EMR time stamp      Subjective:   Interval History:  HPI  The patient complains of bacteremia  The patient feels their symptoms areunchanged  no events or new concerns    Scheduled Meds:acetaminophen, 975 mg, oral, q8h  aspirin, 81 mg, oral, Daily  atorvastatin, 40 mg, oral, Daily  calcitriol, 0.5 mcg, oral, Daily  ceftaroline, 200 mg, intravenous, q12h  daptomycin, 6 mg/kg (Adjusted), intravenous, q48h  diphenhydrAMINE, 12.5 mg, intravenous, Once  docusate sodium, 100 mg, oral, BID  ergocalciferol, 1,250 mcg, oral, Every Sunday  heparin, 1,900 Units, intra-catheter, After Dialysis  heparin, 1,900 Units, intra-catheter, After Dialysis  lactulose, 20 g, oral, TID  levETIRAcetam, 750 mg, oral, Nightly  metoprolol tartrate, 50 mg, oral, BID  pregabalin, 50 mg, oral, BID      Continuous Infusions:   PRN Meds:PRN medications: dextromethorphan-guaifenesin, diphenhydrAMINE, diphenhydrAMINE, diphenhydrAMINE, guaiFENesin, HYDROmorphone, hydrOXYzine HCL, oxyCODONE **OR** oxyCODONE, promethazine **OR** promethazine    Review of Systems   All other systems reviewed and are negative.    Interval Pertinent History:  Social History     Tobacco Use    Smoking status: Never    Smokeless tobacco: Never   Substance Use Topics    Alcohol use: Never         Objective:   Patient Vitals for the past 24 hrs:   BP Temp Temp src Pulse Resp SpO2   12/08/24 1024 (!) 141/95 36.7 °C (98.1 °F) Temporal 83 21 95 %    24 0834 163/83 -- -- 66 -- --   24 0500 148/54 36.2 °C (97.2 °F) Temporal 64 20 97 %   24 -- -- -- -- -- 97 %   24 1900 141/68 37 °C (98.6 °F) Temporal 69 20 99 %   24 1404 126/73 36.3 °C (97.3 °F) Temporal 64 20 95 %       Average, Min, and Max for last 24 hours Vitals:  TEMPERATURE:  Temp  Av.6 °C (97.8 °F)  Min: 36.2 °C (97.2 °F)  Max: 37 °C (98.6 °F)    RESPIRATIONS RANGE: Resp  Av.3  Min: 20  Max: 21    PULSE RANGE: Pulse  Av.2  Min: 64  Max: 83    BLOOD PRESSURE RANGE:  Systolic (24hrs), Av , Min:126 , Max:163   ; Diastolic (24hrs), Av, Min:54, Max:95      PULSE OXIMETRY RANGE: SpO2  Av.6 %  Min: 95 %  Max: 99 %  Body mass index is 25.61 kg/m².    I/O last 3 completed shifts:  In: 650 (9 mL/kg) [P.O.:650]  Out: 0 (0 mL/kg)   Weight: 71.9 kg   Weight change:      Physical Exam  Vitals and nursing note reviewed.   Constitutional:       Appearance: Normal appearance.   HENT:      Head: Normocephalic and atraumatic.      Right Ear: External ear normal.      Left Ear: External ear normal.      Nose: Nose normal.      Mouth/Throat:      Mouth: Mucous membranes are moist.   Eyes:      General: No scleral icterus.        Right eye: No discharge.         Left eye: No discharge.      Extraocular Movements: Extraocular movements intact.      Conjunctiva/sclera: Conjunctivae normal.      Pupils: Pupils are equal, round, and reactive to light.   Cardiovascular:      Rate and Rhythm: Normal rate and regular rhythm.      Heart sounds: Murmur heard.   Pulmonary:      Effort: Pulmonary effort is normal.      Breath sounds: Normal breath sounds.   Chest:      Comments: Right anterior chest wall dialysis cath in place  Abdominal:      General: Abdomen is flat. Bowel sounds are normal.      Palpations: Abdomen is soft.   Musculoskeletal:         General: Normal range of motion.      Right lower leg: No edema.      Left lower leg: No edema.   Skin:     General: Skin  is warm and dry.      Capillary Refill: Capillary refill takes less than 2 seconds.   Neurological:      General: No focal deficit present.   Psychiatric:         Mood and Affect: Mood normal.         Thought Content: Thought content normal.         Judgment: Judgment normal.         Lab Results   Component Value Date     12/08/2024    K 4.4 12/08/2024    CL 97 (L) 12/08/2024    CO2 21 12/08/2024    BUN 39 (H) 12/08/2024    CREATININE 6.89 (H) 12/08/2024    GLUCOSE 92 12/08/2024    CALCIUM 7.8 (L) 12/08/2024    PROT 8.5 (H) 12/02/2024    BILITOT 0.7 12/02/2024    ALKPHOS 106 12/02/2024    AST 18 12/02/2024    ALT 8 12/02/2024    GLOB 4.1 (H) 09/21/2023       Lab Results   Component Value Date    WBC 7.1 12/08/2024    HGB 12.2 12/08/2024    HCT 40.6 12/08/2024    MCV 93 12/08/2024     12/08/2024    LYMPHOPCT 14.2 12/08/2024    RBC 4.38 12/08/2024    MCH 27.9 12/08/2024    MCHC 30.0 (L) 12/08/2024    RDW 16.7 (H) 12/08/2024    CRP 27.39 (H) 12/03/2024       Lab Results   Component Value Date    TSH 2.12 04/28/2024     Lab Results   Component Value Date    LACTATE 1.4 12/02/2024    DDIMER 3.81 (H) 10/26/2022    INR 1.4 (H) 04/28/2024       IMAGES:  Encounter Date: 12/03/24   ECG 12 lead   Result Value    Ventricular Rate 92    Atrial Rate 92    MI Interval 144    QRS Duration 74    QT Interval 338    QTC Calculation(Bazett) 417    P Axis 51    R Axis -8    T Axis 53    QRS Count 15    Q Onset 221    T Offset 390    QTC Fredericia 389    Narrative    Sinus rhythm with Premature atrial complexes  Possible Inferior infarct , age undetermined  Cannot rule out Anterior infarct , age undetermined  Abnormal ECG  When compared with ECG of 02-DEC-2024 14:59, (unconfirmed)  Premature atrial complexes are now Present  Borderline criteria for Inferior infarct are now Present        Transesophageal Echo (LAURA)    Result Date: 12/6/2024   [facility], [address] [address]               Tel  [phone] and Fax [phone] TRANSESOPHAGEAL ECHOCARDIOGRAM REPORT  Patient Name:       RITA PABLO          Reading Physician:    [id number] Wilhelm MD Study Date:         12/6/2024           Ordering Provider:    Eden WILHELM MRN/PID:            [id number]            Fellow: Accession#:         [id number]        Nurse:                Luis Taylor RN Date of Birth/Age:  1975 / 49     Sonographer:          Hawa hirsch                                     RDCS Gender assigned at  F                   Additional Staff: Birth: Height:             167.64 cm           Admit Date:           12/3/2024 Weight:             73.94 kg            Admission Status:     Inpatient -                                                               Routine BSA / BMI:          1.83 m2 / 26.31     Encounter#:           [id number]                     kg/m2 Blood Pressure:     162/97 mmHg         Department Location:  [facility] Cath                                                               Lab Study Type:    TRANSESOPHAGEAL ECHO (LAURA) Diagnosis/ICD: Bacteremia-R78.81 Indication:    Bacteremia CPT Code:      LAURA Complete-99629; Doppler Limited-44620; Color Doppler-76163;                Echocardiography, LAURA add on 3D post processing-33484; Moderate                Sedation Services initial 15 minutes patient >5 years-84271;                Moderate Sedation Services 1st additional 15 minutes patient >5                years-01246 Patient History: CABG:              Mulitivessel CABG. Valve Disorders:   Tricuspid Valve Repair. Pertinent History: HTN. Study Detail: The following Echo studies were performed: 2D, Doppler, color flow               and 3D.  PHYSICIAN INTERPRETATION: LAURA Details: The LAURA probe used was Elizabeth X8. Technically adequate omniplane  transesophageal echocardiogram performed. Color flow Doppler echo was performed to assess for the presence of a patent foramen ovale. LAURA Medication: The pharynx was anesthetized with Cetacaine spray and viscous lidocaine. The patient was sedated using moderate sedation. Total intraservice time for moderate sedation was 15 minutes. Midazolam and Fentanyl was used to sedate the patient for this exam. LAURA Procedure: The probe was passed without difficulty. Complications encountered during procedure: Patient tolerated the procedure well without any apparent complications. Left Ventricle: The left ventricular systolic function is normal, with a visually estimated ejection fraction of 65-70%. The left ventricular cavity size was not assessed. Left ventricular diastolic filling was not assessed. Left Atrium: The left atrium is normal in size. There is no thrombus visualized in the left atrial appendage. Interatrial septum is surgically closed. Right Ventricle: The right ventricle was not well visualized. Right ventricular systolic function not assessed. Right Atrium: The right atrium is normal in size. There is a mobile small right atrial mass. Mass seen attached to the SVC catheter, concerning for infection. There is a device seen from the superior vena cava into the right atrium at the superior vena cava and right atrial junction. Aortic Valve: There is a prosthetic aortic valve present. There is Inspirus bioprosthetic type aortic valve bioprosthesis with a 21 mm reported size. Echo findings are consistent with normal aortic valve prosthesis structure and function. There is no evidence of aortic valve regurgitation. No perivalvular leak. No vegetation. Mitral Valve: There is a prosthetic mitral valve present. There is a St. Devonte bioprosthetic type mitral valve prosthesis with a 27 mm reported size. Echo findings are consistent with normal mitral valve prosthesis structure and function. There is trace mitral valve  regurgitation. No perivalvular leak. No vegetation. Tricuspid Valve: Status post tricuspid valve repair. There is mild tricuspid regurgitation. The Doppler estimated RVSP is within normal limits at 19.0 mmHg. No perivalvular leak. Pulmonic Valve: The pulmonic valve is structurally normal. There is physiologic pulmonic valve regurgitation. Pericardium: There is no pericardial effusion noted. Aorta: The aortic root is normal. There is plaque visualized in the descending aorta which is classified as a Grade 2 [mild (focal or diffuse) intimal thickening of 2-3 mm] atherosclerosis. Pulmonary Veins: The pulmonary veins appear normal and return normally to the left atrium.  CONCLUSIONS:  1. The left ventricular systolic function is normal, with a visually estimated ejection fraction of 65-70%.  2. There is a St. Devonte bioprosthetic type mitral valve prosthesis with a 27 mm reported size.  3. Status post tricuspid valve repair.  4. Right ventricular systolic pressure is within normal limits.  5. There is Inspirus bioprosthetic type aortic valve bioprosthesis with a 21 mm reported size.  6. Echo findings are consistent with normal aortic valve prosthesis structure and function.  7. Echo findings are consistent with normal mitral valve prosthesis structure and function.  8. Mass seen attached to the SVC catheter, concerning for infection.  9. There is plaque visualized in the descending aorta. QUANTITATIVE DATA SUMMARY:  LV SYSTOLIC FUNCTION BY 2D PLANIMETRY (MOD):                      Normal Ranges: EF-Visual:      68 % LV EF Reported: 68 %  TRICUSPID VALVE/RVSP:          Normal Ranges: Peak TR Velocity:     2.00 m/s RV Syst Pressure:     19 mmHg  (< 30mmHg)  [id number] Lyndsey Moncada MD Electronically signed on 12/6/2024 at 12:40:24 PM  ** Final (Updated) **     Transthoracic Echo (TTE) Complete    Result Date: 12/4/2024   [facility], [address] [address]               Tel [phone] and Fax  [phone] TRANSTHORACIC ECHOCARDIOGRAM REPORT  Patient Name:       RITA PABLO          Reading Physician:    [id number] Lyndsey Moncada MD Study Date:         12/4/2024           Ordering Provider:    [id number] VICENTE FRANCO MRN/PID:            [id number]            Fellow: Accession#:         [id number]        Nurse: Date of Birth/Age:  1975 / 49     Sonographer:          Yun hirsch                                     RDANJANA Gender assigned at  F                   Additional Staff: Birth: Height:             167.64 cm           Admit Date:           12/3/2024 Weight:             71.21 kg            Admission Status:     Inpatient -                                                               Routine BSA / BMI:          1.80 m2 / 25.34     Encounter#:           [id number]                     kg/m2 Blood Pressure:     145/74 mmHg         Department Location:  [facility] Non                                                               Invasive Study Type:    TRANSTHORACIC ECHO (TTE) COMPLETE Diagnosis/ICD: 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 Pertinent History: Fever, CAD and ESRD,AV= Inspiris 21mm, MV= EPIC St. Devonte                    27mm. Study Detail: The following Echo studies were performed: 2D, M-Mode, Doppler and               color flow. Patient has a pacemaker.               The patient was awake.  PHYSICIAN INTERPRETATION: Left  Ventricle: The left ventricular systolic function is normal, with a visually estimated ejection fraction of 65-70%. The left ventricular cavity size is normal. There is mildly increased septal and mildly increased posterior left ventricular wall thickness. There is left ventricular concentric remodeling. Abnormal (paradoxical) septal motion consistent with post-operative status. Left ventricular diastolic filling cannot be determined, due to mitral valve repair/replacement. Left Atrium: The left atrium was not well visualized. Right Ventricle: The right ventricle is mildly enlarged. There is normal right ventricular global systolic function. RV free wall is not well visualized. Right Atrium: The right atrium is normal in size. Aortic Valve: There is a prosthetic aortic valve present. The aortic valve dimensionless index is 0.46. There is Inspiris bioprosthetic type aortic valve bioprosthesis with a 21 mm reported size. Echo findings are consistent with normal aortic valve prosthesis structure and function. There is no evidence of aortic valve regurgitation. The peak instantaneous gradient of the aortic valve is 33 mmHg. The mean gradient of the aortic valve is 18 mmHg. The valve is not well visualized in the short axis views. No obvious vegetation. Mitral Valve: There is a prosthetic mitral valve present. There is a St. Devonte bioprosthetic type mitral valve prosthesis with a 27 mm reported size. The peak and mean gradients are 13 mmHg and 6 mmHg respectively. The peak instantaneous gradient of the mitral valve is 13 mmHg. Echo findings are consistent with normal mitral valve prosthesis structure and function. There is trace mitral valve regurgitation. No obvious vegetation. Tricuspid Valve: Status post tricuspid valve repair. There is trace to mild tricuspid regurgitation. The Doppler estimated RVSP is mildly elevated at 35.5 mmHg. Mean gradient is 2mmHg. No tricuspid stenosis. No obvious vegetation. Pulmonic Valve:  The pulmonic valve is not well visualized. There is physiologic pulmonic valve regurgitation. Pericardium: There is no pericardial effusion noted. Aorta: The aortic root is normal. Systemic Veins: The inferior vena cava appears small in size, with IVC inspiratory collapse greater than 50%.  CONCLUSIONS:  1. Poorly visualized anatomical structures due to suboptimal image quality.  2. The left ventricular systolic function is normal, with a visually estimated ejection fraction of 65-70%.  3. Abnormal septal motion consistent with post-operative status.  4. There is normal right ventricular global systolic function.  5. Mildly enlarged right ventricle.  6. There is a St. Devonte bioprosthetic type mitral valve prosthesis with a 27 mm reported size. The peak and mean gradients are 13 mmHg and 6 mmHg respectively.  7. Status post tricuspid valve repair.  8. Mildly elevated right ventricular systolic pressure.  9. There is Inspiris bioprosthetic type aortic valve bioprosthesis with a 21 mm reported size. 10. Echo findings are consistent with normal aortic valve prosthesis structure and function. 11. Echo findings are consistent with normal mitral valve prosthesis structure and function. QUANTITATIVE DATA SUMMARY:  2D MEASUREMENTS:          Normal Ranges: IVSd:            0.98 cm  (0.6-1.1cm) LVPWd:           1.02 cm  (0.6-1.1cm) LVIDd:           3.23 cm  (3.9-5.9cm) LVIDs:           2.33 cm LV Mass Index:   51 g/m2 LVEDV Index:     41 ml/m2 LV % FS          27.9 %  LA VOLUME:                    Normal Ranges: LA Vol A4C:        32.3 ml    (22+/-6mL/m2) LA Vol A2C:        25.1 ml LA Vol BP:         31.8 ml LA Vol Index A4C:  17.9 ml/m2 LA Vol Index A2C:  13.9 ml/m2 LA Vol Index BP:   17.6 ml/m2 LA Area A4C:       13.5 cm2 LA Area A2C:       10.7 cm2 LA Major Axis A4C: 4.8 cm LA Major Axis A2C: 3.8 cm LA Vol A4C:        27.3 ml LA Vol A2C:        22.0 ml LA Vol Index BSA:  13.7 ml/m2  RA VOLUME BY A/L METHOD:          Normal  Ranges: RA Area A4C:             16.4 cm2  AORTA MEASUREMENTS:         Normal Ranges: Ao Sinus, d:        2.00 cm (2.1-3.5cm) Asc Ao, d:          2.60 cm (2.1-3.4cm)  LV SYSTOLIC FUNCTION BY 2D PLANIMETRY (MOD):                      Normal Ranges: EF-A4C View:    62 % (>=55%) EF-A2C View:    72 % EF-Biplane:     67 % EF-Visual:      68 % LV EF Reported: 68 %  LV DIASTOLIC FUNCTION:             Normal Ranges: MV Peak E:             1.92 m/s    (0.7-1.2 m/s) MV Peak A:             1.01 m/s    (0.42-0.7 m/s) E/A Ratio:             1.90        (1.0-2.2) MV e'                  0.060 m/s   (>8.0) MV lateral e'          0.06 m/s MV medial e'           0.06 m/s MV A Dur:              117.03 msec E/e' Ratio:            32.04       (<8.0)  MITRAL VALVE:           Normal Ranges: MV Vmax:      1.79 m/s  (<=1.3m/s) MV peak P.9 mmHg (<5mmHg) MV mean P.8 mmHg  (<2mmHg) MV VTI:       49.37 cm  (10-13cm) MV DT:        238 msec  (150-240msec)  AORTIC VALVE:                      Normal Ranges: AoV Vmax:                2.87 m/s  (<=1.7m/s) AoV Peak P.8 mmHg (<20mmHg) AoV Mean P.2 mmHg (1.7-11.5mmHg) LVOT Max Jamarcus:            1.18 m/s  (<=1.1m/s) AoV VTI:                 53.97 cm  (18-25cm) LVOT VTI:                24.65 cm LVOT Diameter:           1.76 cm   (1.8-2.4cm) AoV Area, VTI:           1.11 cm2  (2.5-5.5cm2) AoV Area,Vmax:           1.00 cm2  (2.5-4.5cm2) AoV Dimensionless Index: 0.46  RIGHT VENTRICLE: RV Basal 4.20 cm RV Mid   3.40 cm RV Major 8.7 cm TAPSE:   13.0 mm RV s'    0.10 m/s  TRICUSPID VALVE/RVSP:          Normal Ranges: Peak TR Velocity:     2.85 m/s RV Syst Pressure:     36 mmHg  (< 30mmHg) IVC Diam:             1.00 cm  PULMONIC VALVE:          Normal Ranges: PV Max Jamarcus:     1.0 m/s  (0.6-0.9m/s) PV Max PG:      3.8 mmHg  AORTA: Asc Ao Diam 2.55 cm  [id number] Lyndsey Moncada MD Electronically signed on 2024 at 12:54:13 PM  ** Final **     Transesophageal Echo  (LAURA)    Result Date: 5/1/2024           [facility] [address] [place], [place] [address]            Phone [phone] TRANSESOPHAGEAL ECHOCARDIOGRAM REPORT  Patient Name:     RITA PABLO MAVERICK Reading Physician:   [id number] Flores DO Study Date:       5/1/2024             Ordering Provider:   [id number] POLLY FLORES MRN/PID:          [id number]             Fellow: Accession#:       [id number]         Nurse: Date of           1975 / 48      Sonographer:         Dionte Vasquez RDCS Birth/Age:        years Gender:           F                    Additional Staff: Height:           172.00 cm            Admit Date: Weight:           70.31 kg             Admission Status:    Inpatient - Routine BSA / BMI:        1.83 m2 / 23.77      Department Location: [facility]                   kg/m2 Blood Pressure: 153 /72 mmHg Study Type:    TRANSESOPHAGEAL ECHO (LAURA) Diagnosis/ICD: Viral endocarditis-B33.21 Indication:    Endocarditis CPT Codes:     LAURA Complete-32624  Study Detail: The following Echo studies were performed: 2D, M-Mode and color               flow.  PHYSICIAN INTERPRETATION: LAURA Details: The LAURA probe used was [id number]. Technically adequate omniplane transesophageal echocardiogram performed. LAURA Medication: The patient was sedated by Anesthesia; please refer to anesthesia flow sheet for medications used. LAURA Procedure: The probe was passed without difficulty. The following complication was encountered during the procedure: Patient tolerated the procedure well without any apparent complications. Left Ventricle: Left ventricular systolic function is normal. There are no regional wall motion abnormalities. The left ventricular cavity size is normal. Left ventricular diastolic filling was indeterminate. Left Atrium: The left atrium is normal in size. There is a normal sized left atrial appendage, there is no thrombus visualized in  the left atrial appendage and the left atrial appendage Doppler velocities are normal. Right Ventricle: The right ventricle is normal in size. There is normal right ventricular global systolic function. Right Atrium: The right atrium was not well visualized. Aortic Valve: There is a prosthetic aortic valve present. Echo findings are consistent with normal aortic valve prosthesis structure and function. There is no evidence of aortic valve regurgitation. Mitral Valve: There is a prosthetic mitral valve present. Echo findings are consistent with normal mitral valve prosthesis structure and function. There is trace mitral valve regurgitation. Trace prosthetic mitral regurgitation. Mobile echodensity previously seen is not visualized. Tricuspid Valve: The tricuspid valve was not well visualized. Tricuspid regurgitation was not assessed. Pulmonic Valve: The pulmonic valve is not well visualized. The pulmonic valve regurgitation was not well visualized. Pericardium: There is no pericardial effusion noted. Aorta: The aortic root is normal.  CONCLUSIONS:  1. Left ventricular systolic function is normal. QUANTITATIVE DATA SUMMARY:  [id number] Fabian Skaggs DO Electronically signed on 5/1/2024 at 3:47:59 PM  ** Final **     Transthoracic Echo (TTE) Complete    Result Date: 4/29/2024           [place] [id number]            Phone [phone] TRANSTHORACIC ECHOCARDIOGRAM REPORT  Patient Name:      RITA SAMY TEMPLE  Reading Physician:    [id number] Fabian Skaggs DO Study Date:        4/29/2024             Ordering Provider:    [id number] LIZAMA MRN/PID:           [id number]              Fellow: Accession#:        [id number]          Nurse: Date of Birth/Age: 1975 / 48 years Sonographer:          Dionte Vasquez                                                                 [facility] Gender:            F                     Additional Staff: Height:            170.00 cm             Admit Date: Weight:            69.00 kg              Admission Status:     Inpatient -                                                                Routine BSA / BMI:         1.80 m2 / 23.88 kg/m2 Department Location:  [facility] ICU Blood Pressure: 144 /36 mmHg Study Type:    TRANSTHORACIC ECHO (TTE) COMPLETE Diagnosis/ICD: Endocarditis, valve unspecified-I38 Indication:    Endocarditis CPT Codes:     Echo Complete w Full Doppler-51884 Patient History: Pertinent History: CAD, Chest Pain, CHF, HTN and Hyperlipidemia. Pacemaker,                    ESRD, MRSA, TVr-2013/Ring, MVR-2022/#27 St [place], AVR-2022/#21                    Inspirus. Study Detail: The following Echo studies were performed: 2D, M-Mode, Doppler and               color flow. Technically challenging study due to poor acoustic               windows and patient lying in supine position.  PHYSICIAN INTERPRETATION: Left Ventricle: Left ventricular systolic function is normal, with an estimated ejection fraction of 65-70%. There are no regional wall motion abnormalities. The left ventricular cavity size is normal. Spectral Doppler shows an impaired relaxation pattern of left ventricular diastolic filling. Left Atrium: The left atrium is normal in size. Right Ventricle: The right ventricle is normal in size. There is normal right ventricular global systolic function. Right Atrium: The right atrium is normal in size. Aortic Valve: There is a prosthetic aortic valve present. There is bioprosthetic aortic valve. Echo findings are consistent with normal aortic valve prosthesis structure and function. There is no evidence of aortic valve regurgitation. The peak instantaneous gradient of the aortic valve is 50.7 mmHg. The mean gradient of the aortic valve is 27.0 mmHg. Mitral Valve: There is a prosthetic mitral valve present. There is a  normally functioning mitral valve prosthesis. There is no evidence of mitral valve regurgitation. Tricuspid Valve: The tricuspid valve is structurally normal. There is trace tricuspid regurgitation. Pulmonic Valve: The pulmonic valve is not well visualized. The pulmonic valve regurgitation was not well visualized. Pericardium: There is no pericardial effusion noted. Aorta: The aortic root is normal.  CONCLUSIONS:  1. Left ventricular systolic function is normal with a 65-70% estimated ejection fraction.  2. Spectral Doppler shows an impaired relaxation pattern of left ventricular diastolic filling.  3. There is a normally functioning mitral valve prosthesis.  4. There is a bioprosthetic aortic valve. QUANTITATIVE DATA SUMMARY: 2D MEASUREMENTS:                          Normal Ranges: LAs:           3.30 cm   (2.7-4.0cm) IVSd:          0.99 cm   (0.6-1.1cm) LVPWd:         0.96 cm   (0.6-1.1cm) LVIDd:         3.93 cm   (3.9-5.9cm) LVIDs:         2.52 cm LV Mass Index: 66.4 g/m2 LV % FS        35.9 % LA VOLUME:                               Normal Ranges: LA Vol A4C:        53.4 ml    (22+/-6mL/m2) LA Vol A2C:        38.7 ml LA Vol BP:         47.0 ml LA Vol Index A4C:  29.7ml/m2 LA Vol Index A2C:  21.5 ml/m2 LA Vol Index BP:   26.2 ml/m2 LA Area A4C:       17.0 cm2 LA Area A2C:       14.0 cm2 LA Major Axis A4C: 4.6 cm LA Major Axis A2C: 4.3 cm LA Volume Index:   25.0 ml/m2 LA Vol A4C:        49.0 ml LA Vol A2C:        37.0 ml RA VOLUME BY A/L METHOD:                       Normal Ranges: RA Area A4C: 16.0 cm2 LV SYSTOLIC FUNCTION BY 2D PLANIMETRY (MOD):                     Normal Ranges: EF-A4C View: 59.6 % (>=55%) EF-A2C View: 65.1 % EF-Biplane:  61.8 % LV DIASTOLIC FUNCTION:                     Normal Ranges: MV Peak E: 1.56 m/s (0.7-1.2 m/s) MV Peak A: 1.58 m/s (0.42-0.7 m/s) E/A Ratio: 0.99     (1.0-2.2) MITRAL VALVE:                       Normal Ranges: MV Vmax:    1.66 m/s  (<=1.3m/s) MV peak P.0 mmHg  (<5mmHg) MV mean P.0 mmHg  (<48mmHg) MV DT:      254 msec  (150-240msec) AORTIC VALVE:                                    Normal Ranges: AoV Vmax:                3.56 m/s  (<=1.7m/s) AoV Peak P.7 mmHg (<20mmHg) AoV Mean P.0 mmHg (1.7-11.5mmHg) LVOT Max Jamarcus:            1.41 m/s  (<=1.1m/s) AoV VTI:                 64.40 cm  (18-25cm) LVOT VTI:                30.60 cm AoV Dimensionless Index: 0.48  RIGHT VENTRICLE: RV Basal 3.39 cm RV Mid   2.40 cm RV Major 6.2 cm TAPSE:   17.5 mm RV s'    0.12 m/s TRICUSPID VALVE/RVSP:                             Normal Ranges: Peak TR Velocity: 2.58 m/s RV Syst Pressure: 29.6 mmHg (< 30mmHg) IVC Diam:         1.41 cm PULMONIC VALVE:                         Normal Ranges: PV Accel Time: 92 msec  (>120ms) PV Max Jamarcus:    1.2 m/s  (0.6-0.9m/s) PV Max P.9 mmHg  Magdaleno Flores DO Electronically signed on 2024 at 2:07:09 PM  ** Final **     Transesophageal Echo (LAURA)    Result Date: 2024           [place] [address]            Phone [phone] TRANSESOPHAGEAL ECHOCARDIOGRAM REPORT  Patient Name:     RITA SAMY TEMPLE Reading Physician:   [id number] Flores DO Study Date:       2024            Ordering Provider:   [id number] FLORES MRN/PID:          [id number]             Fellow: Accession#:       [id number]         Nurse: Date of           1975      Sonographer:         Dionte Vasquez RDCS Birth/Age:        years Gender:           F                    Additional Staff: Height:           165.00 cm            Admit Date: Weight:           65.00 kg             Admission Status:    Inpatient - Routine BSA:              1.72 m2              Department Location: 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 ESRD, Pacemaker, s/p                    Redo-sternotomy with TVr-ring repair, AVR #21 Inspirus, MVR                    #27 St [place]onte Epic Bioprosthesis. Study Detail: The following Echo studies were performed: 2D, color flow and               Doppler.  PHYSICIAN INTERPRETATION: LAURA Details: The LAURA probe used was [id number]. Technically adequate omniplane transesophageal echocardiogram performed. Color flow Doppler echo was performed to assess for the presence of a patent foramen ovale. LAURA Medication: The patient was sedated by Anesthesia; please refer to anesthesia flow sheet for medications used. LAURA Procedure: The probe was passed without difficulty. Left Ventricle: Left ventricular systolic function is normal. There are no regional wall motion abnormalities. The left ventricular cavity size is normal. Left ventricular diastolic filling was not assessed. Left Atrium: The left atrium is normal in size. There is a normal sized left atrial appendage, the left atrial appendage Doppler velocities are normal and there is no thrombus visualized in the left atrial appendage. Right Ventricle: The right ventricle is normal in size. There is normal right ventricular global systolic function. Right Atrium: The right atrium is normal in size. Aortic Valve: There is no visualized aortic valve vegetation. There is a prosthetic aortic valve present. There is bioprosthetic aortic valve. There is no evidence of aortic valve regurgitation. Mitral Valve: There is a prosthetic mitral valve present. There is a mitral valve bioprosthesis. There is trace mitral valve regurgitation. There is a mobile echodensity at the base of the anterior leaflet suspicious for vegetation, decreased in size in comparison to 9/2023 study. There is a small perforation of this leaflet associated but overall the valve integrity is fairly well  preserved. Tricuspid Valve: The tricuspid valve is structurally normal. There is trace tricuspid regurgitation. Pulmonic Valve: The pulmonic valve was not assessed. The pulmonic valve regurgitation was not assessed. Pericardium: There is no pericardial effusion noted. Aorta: The aortic root is normal.  CONCLUSIONS:  1. Left ventricular systolic function is normal.  2. There is a mobile echodensity at the base of the anterior leaflet suspicious for vegetation, decreased in size in comparison to 9/2023 study. There is a small perforation of this leaflet associated but overall the valve integrity is fairly well preserved.  3. No aortic valve vegetation visualized.  4. There is a bioprosthetic aortic valve. QUANTITATIVE DATA SUMMARY:  [id number] Fabian Skaggs DO Electronically signed on 1/12/2024 at 1:32:47 PM  ** Final **     Transthoracic Echo (TTE) Complete    Result Date: 1/11/2024           [place] [address]            Phone [phone] TRANSTHORACIC ECHOCARDIOGRAM REPORT  Patient Name:     RITA SAMY TEMPLE Reading Physician:   [id number] Fabian Skaggs DO Study Date:       1/10/2024            Ordering Provider:   [id number] MASSIEL NARANJO MRN/PID:          [id number]             Fellow: Accession#:       [id number]         Nurse: Date of           1975 / 48      Sonographer:         Lety Ivy RDCS Birth/Age:        years Gender:           F                    Additional Staff: Height:           165.10 cm            Admit Date: Weight:           63.96 kg             Admission Status:    Inpatient - Routine BSA:              1.71 m2              Department Location: [facility] Blood Pressure: 110 /36 mmHg Study Type:    TRANSTHORACIC ECHO (TTE) COMPLETE Diagnosis/ICD: Presence of prosthetic heart valve-Z95.2 Indication:    bacteremia CPT Codes:     Echo Complete w Full Doppler-17236 Patient History:  Pacer/Defib:       Permanent pacemaker Pertinent History: HTN. s/p MVR, s/p AVR,bacteremia, PAF,ESRD, Anemia,                    CABG,endocarditis. Study Detail: The following Echo studies were performed: 2D, M-Mode, color flow               and Doppler. Technically challenging study due to dialysis cath lt               parasternal.  PHYSICIAN INTERPRETATION: Left Ventricle: Left ventricular systolic function is normal, with an estimated ejection fraction of 60-65%. There are no regional wall motion abnormalities. The left ventricular cavity size is normal. Spectral Doppler shows a normal pattern of left ventricular diastolic filling. Left Atrium: The left atrium is normal in size. Right Ventricle: The right ventricle is normal in size. There is normal right ventricular global systolic function. Right Atrium: The right atrium is normal in size. Aortic Valve: There is no visualized aortic valve vegetation. There is a prosthetic aortic valve present. There is bioprosthetic aortic valve. There is no evidence of aortic valve regurgitation. The peak instantaneous gradient of the aortic valve is 33.2 mmHg. The mean gradient of the aortic valve is 19.0 mmHg. Mitral Valve: There is a prosthetic mitral valve present. There is a normally functioning mitral valve prosthesis. There was no mitral valve vegetation visualized. There is trace mitral valve regurgitation. Tricuspid Valve: The tricuspid valve is structurally normal. There is trace tricuspid regurgitation. Pulmonic Valve: The pulmonic valve is not well visualized. The pulmonic valve regurgitation was not well visualized. Pericardium: There is no pericardial effusion noted. Aorta: The aortic root is normal.  CONCLUSIONS:  1. Left ventricular systolic function is normal with a 60-65% estimated ejection fraction.  2. No mitral valve vegetation visualized.  3. There is a normally functioning mitral valve prosthesis.  4. No aortic valve vegetation visualized.  5. There is  a bioprosthetic aortic valve. QUANTITATIVE DATA SUMMARY: 2D MEASUREMENTS:                          Normal Ranges: IVSd:          0.85 cm   (0.6-1.1cm) LVPWd:         0.72 cm   (0.6-1.1cm) LVIDd:         3.87 cm   (3.9-5.9cm) LV Mass Index: 50.5 g/m2 LV SYSTOLIC FUNCTION BY 2D PLANIMETRY (MOD):                     Normal Ranges: EF-A4C View: 60.9 % (>=55%) EF-A2C View: 65.0 % EF-Biplane:  62.9 % LV DIASTOLIC FUNCTION:                     Normal Ranges: MV Peak E: 1.87 m/s (0.7-1.2 m/s) MV Peak A: 1.01 m/s (0.42-0.7 m/s) E/A Ratio: 1.85     (1.0-2.2) MITRAL VALVE:                       Normal Ranges: MV Vmax:    1.78 m/s  (<=1.3m/s) MV peak P.7 mmHg (<5mmHg) MV mean P.0 mmHg  (<48mmHg) MV DT:      320 msec  (150-240msec) AORTIC VALVE:                                    Normal Ranges: AoV Vmax:                2.88 m/s  (<=1.7m/s) AoV Peak P.2 mmHg (<20mmHg) AoV Mean P.0 mmHg (1.7-11.5mmHg) LVOT Max Jamarcus:            1.12 m/s  (<=1.1m/s) AoV VTI:                 62.50 cm  (18-25cm) LVOT VTI:                21.00 cm LVOT Diameter:           2.00 cm   (1.8-2.4cm) AoV Area, VTI:           1.06 cm2  (2.5-5.5cm2) AoV Area,Vmax:           1.22 cm2  (2.5-4.5cm2) AoV Dimensionless Index: 0.34 TRICUSPID VALVE/RVSP:                             Normal Ranges: Peak TR Velocity: 2.84 m/s RV Syst Pressure: 35.3 mmHg (< 30mmHg) IVC Diam:         1.28 cm  [id number] Fabian Skaggs DO Electronically signed on 2024 at 11:38:48 AM  ** Final **    === 24 ===    XR CHEST 1 VIEW    - Impression -  1.  Developing right basal infiltrate. See discussion above. Possible  developing retrocardiac left basal infiltrate.        MACRO:  None    Signed by: Joseph Schoenberger 2024 3:59 PM  Dictation workstation:   [id number]  === 24 ===    CT ABDOMEN PELVIS WO IV CONTRAST    - Impression -  1.  Large amount of formed stool throughout the colon without wall  thickening or inflammatory change  suggestive of constipation.  Clinical correlation recommended.  2. Nonspecific gallbladder distention without radiopaque calculi.  Unchanged mild splenomegaly. Correlation with liver enzymes is  recommended.  3. Unchanged 4.4 x 3.9 cm right ovarian cyst. Follow-up outpatient  ultrasound recommended.      Signed by: Harinder De La Rosa 12/2/2024 8:03 PM  Dictation workstation:   [id number]  === 12/02/24 ===    XR CHEST 1 VIEW    - Impression -  1.  Developing right basal infiltrate. See discussion above. Possible  developing retrocardiac left basal infiltrate.        MACRO:  None    Signed by: Joseph Schoenberger 12/2/2024 3:59 PM  Dictation workstation:   [id number]      Additional results of the last 24 hours have been reviewed.    Dictated using ViewReple Version 2.4  Proof read however unrecognized voice recognition errors may have occurred     Electronically signed by David Oconnor MD on 12/08/24 at 1:13 PM

## 2024-12-08 NOTE — PROGRESS NOTES
Batsheva Page is a 49 y.o. female on day 5 of admission presenting with Sepsis (Multi).    Subjective   Interval History: no fever, no new complaints        Review of Systems    Objective   Range of Vitals (last 24 hours)  Heart Rate:  [64-69]   Temp:  [36.2 °C (97.2 °F)-37 °C (98.6 °F)]   Resp:  [20-21]   BP: (103-163)/(51-83)   SpO2:  [95 %-99 %]   Daily Weight  12/06/24 : 71.9 kg (158 lb 9.6 oz)    Body mass index is 25.61 kg/m².    Physical Exam  Constitutional:       Appearance: Normal appearance.   HENT:      Head: Normocephalic and atraumatic.      Mouth/Throat:      Mouth: Mucous membranes are moist.      Pharynx: Oropharynx is clear.   Eyes:      Pupils: Pupils are equal, round, and reactive to light.   Cardiovascular:      Rate and Rhythm: Normal rate and regular rhythm.      Heart sounds: Normal heart sounds.   Pulmonary:      Effort: Pulmonary effort is normal.      Breath sounds: Normal breath sounds.      Comments: Rt sided line was removed  Abdominal:      General: Abdomen is flat. Bowel sounds are normal.      Palpations: Abdomen is soft.   Musculoskeletal:      Cervical back: Normal range of motion.   Neurological:      Mental Status: She is alert.         Antibiotics  ceftaroline (Teflaro) IV in 50 mL D5W  DAPTOmycin (Cubicin) IV in 50 mL NS    Relevant Results  Labs  Results from last 72 hours   Lab Units 12/08/24  0540 12/07/24  0604 12/06/24  0558   WBC AUTO x10*3/uL 7.1 5.2 4.3*   HEMOGLOBIN g/dL 12.2 11.6* 11.5*   HEMATOCRIT % 40.6 39.0 39.9   PLATELETS AUTO x10*3/uL 199 177 163   NEUTROS PCT AUTO % 68.7 60.8 66.1   LYMPHS PCT AUTO % 14.2 17.6 17.6   MONOS PCT AUTO % 6.8 8.0 10.2   EOS PCT AUTO % 9.3 12.2 4.9     Results from last 72 hours   Lab Units 12/08/24  0540 12/07/24  0604 12/06/24  0558   SODIUM mmol/L 136 136 133*   POTASSIUM mmol/L 4.4 4.0 4.0   CHLORIDE mmol/L 97* 98 95*   CO2 mmol/L 21 22 24   BUN mg/dL 39* 29* 15   CREATININE mg/dL 6.89* 5.46* 3.56*   GLUCOSE mg/dL 92 98  82   CALCIUM mg/dL 7.8* 8.2* 8.4*   ANION GAP mmol/L 22* 20 18   EGFR mL/min/1.73m*2 7* 9* 15*   PHOSPHORUS mg/dL 5.0* 4.0 2.9     Results from last 72 hours   Lab Units 12/08/24  0540 12/07/24  0604 12/06/24  0558   ALBUMIN g/dL 3.2* 3.3* 3.5     Estimated Creatinine Clearance: 9.9 mL/min (A) (by C-G formula based on SCr of 6.89 mg/dL (H)).  C-Reactive Protein   Date Value Ref Range Status   12/03/2024 27.39 (H) <1.00 mg/dL Final     CRP   Date Value Ref Range Status   12/25/2022 9.90 (A) mg/dL Final     Comment:     REF VALUE  < 1.00     12/23/2022 23.46 (A) mg/dL Final     Comment:     REF VALUE  < 1.00       Microbiology  Susceptibility data from last 14 days.  Collected Specimen Info Organism Clindamycin Erythromycin Oxacillin Tetracycline Trimethoprim/Sulfamethoxazole Vancomycin   12/04/24 Blood culture from Dialysis Staphylococcus aureus         12/03/24 Swab from Anterior Nares Methicillin Susceptible Staphylococcus aureus (MSSA)         12/03/24 Blood culture from Dialysis Staphylococcus aureus         12/02/24 Blood culture from Peripheral Venipuncture Staphylococcus aureus         12/02/24 Blood culture from Peripheral Venipuncture Staphylococcus aureus         12/02/24 Blood culture from Peripheral Venipuncture Staphylococcus aureus         12/02/24 Blood culture from Peripheral Venipuncture Methicillin Resistant Staphylococcus aureus (MRSA)  S  S  R  S  S  S   Imaging  Reviewed       Assessment/Plan   Sepsis / bacteremia, likely dialysis line related, she has AVR / MVR, MRSA, the last positive BC 12/4, TTE without vegetations, LAURA with vegetation on the line sp removal   Atelectasis  Recommendations :  Continue  Ceftaroline / Daptomycin  Increase the activity  Discussed with the medical team     I spent minutes in the professional and overall care of this patient.      Richard Gardiner MD

## 2024-12-09 LAB
ALBUMIN SERPL BCP-MCNC: 3.2 G/DL (ref 3.4–5)
ANION GAP SERPL CALC-SCNC: 24 MMOL/L (ref 10–20)
BACTERIA BLD AEROBE CULT: ABNORMAL
BACTERIA BLD CULT: ABNORMAL
BACTERIA BLD CULT: NORMAL
BASOPHILS # BLD AUTO: 0.03 X10*3/UL (ref 0–0.1)
BASOPHILS NFR BLD AUTO: 0.4 %
BUN SERPL-MCNC: 45 MG/DL (ref 6–23)
CALCIUM SERPL-MCNC: 7.9 MG/DL (ref 8.6–10.3)
CHLORIDE SERPL-SCNC: 97 MMOL/L (ref 98–107)
CO2 SERPL-SCNC: 20 MMOL/L (ref 21–32)
CREAT SERPL-MCNC: 8.16 MG/DL (ref 0.5–1.05)
EGFRCR SERPLBLD CKD-EPI 2021: 6 ML/MIN/1.73M*2
EOSINOPHIL # BLD AUTO: 0.59 X10*3/UL (ref 0–0.7)
EOSINOPHIL NFR BLD AUTO: 8 %
ERYTHROCYTE [DISTWIDTH] IN BLOOD BY AUTOMATED COUNT: 16.5 % (ref 11.5–14.5)
GLUCOSE SERPL-MCNC: 79 MG/DL (ref 74–99)
GRAM STN SPEC: ABNORMAL
HCT VFR BLD AUTO: 39.7 % (ref 36–46)
HGB BLD-MCNC: 11.8 G/DL (ref 12–16)
IMM GRANULOCYTES # BLD AUTO: 0.05 X10*3/UL (ref 0–0.7)
IMM GRANULOCYTES NFR BLD AUTO: 0.7 % (ref 0–0.9)
LYMPHOCYTES # BLD AUTO: 1.04 X10*3/UL (ref 1.2–4.8)
LYMPHOCYTES NFR BLD AUTO: 14.1 %
MAGNESIUM SERPL-MCNC: 1.8 MG/DL (ref 1.6–2.4)
MCH RBC QN AUTO: 27.6 PG (ref 26–34)
MCHC RBC AUTO-ENTMCNC: 29.7 G/DL (ref 32–36)
MCV RBC AUTO: 93 FL (ref 80–100)
MONOCYTES # BLD AUTO: 0.42 X10*3/UL (ref 0.1–1)
MONOCYTES NFR BLD AUTO: 5.7 %
NEUTROPHILS # BLD AUTO: 5.23 X10*3/UL (ref 1.2–7.7)
NEUTROPHILS NFR BLD AUTO: 71.1 %
NRBC BLD-RTO: 0 /100 WBCS (ref 0–0)
PHOSPHATE SERPL-MCNC: 5.6 MG/DL (ref 2.5–4.9)
PLATELET # BLD AUTO: 199 X10*3/UL (ref 150–450)
POTASSIUM SERPL-SCNC: 4.5 MMOL/L (ref 3.5–5.3)
RBC # BLD AUTO: 4.27 X10*6/UL (ref 4–5.2)
SODIUM SERPL-SCNC: 136 MMOL/L (ref 136–145)
WBC # BLD AUTO: 7.4 X10*3/UL (ref 4.4–11.3)

## 2024-12-09 PROCEDURE — 2060000001 HC INTERMEDIATE ICU ROOM DAILY

## 2024-12-09 PROCEDURE — 2500000001 HC RX 250 WO HCPCS SELF ADMINISTERED DRUGS (ALT 637 FOR MEDICARE OP): Performed by: NURSE PRACTITIONER

## 2024-12-09 PROCEDURE — 99233 SBSQ HOSP IP/OBS HIGH 50: CPT | Performed by: STUDENT IN AN ORGANIZED HEALTH CARE EDUCATION/TRAINING PROGRAM

## 2024-12-09 PROCEDURE — 84520 ASSAY OF UREA NITROGEN: CPT | Performed by: INTERNAL MEDICINE

## 2024-12-09 PROCEDURE — 36415 COLL VENOUS BLD VENIPUNCTURE: CPT | Performed by: INTERNAL MEDICINE

## 2024-12-09 PROCEDURE — 2500000004 HC RX 250 GENERAL PHARMACY W/ HCPCS (ALT 636 FOR OP/ED): Performed by: INTERNAL MEDICINE

## 2024-12-09 PROCEDURE — 2500000001 HC RX 250 WO HCPCS SELF ADMINISTERED DRUGS (ALT 637 FOR MEDICARE OP): Performed by: INTERNAL MEDICINE

## 2024-12-09 PROCEDURE — 83735 ASSAY OF MAGNESIUM: CPT | Performed by: INTERNAL MEDICINE

## 2024-12-09 PROCEDURE — 2500000004 HC RX 250 GENERAL PHARMACY W/ HCPCS (ALT 636 FOR OP/ED): Mod: JZ | Performed by: INTERNAL MEDICINE

## 2024-12-09 PROCEDURE — 85025 COMPLETE CBC W/AUTO DIFF WBC: CPT | Performed by: INTERNAL MEDICINE

## 2024-12-09 PROCEDURE — 94760 N-INVAS EAR/PLS OXIMETRY 1: CPT

## 2024-12-09 ASSESSMENT — COGNITIVE AND FUNCTIONAL STATUS - GENERAL
MOBILITY SCORE: 24
DAILY ACTIVITIY SCORE: 24
MOBILITY SCORE: 24
DAILY ACTIVITIY SCORE: 24

## 2024-12-09 ASSESSMENT — PAIN SCALES - GENERAL
PAINLEVEL_OUTOF10: 10 - WORST POSSIBLE PAIN
PAINLEVEL_OUTOF10: 8
PAINLEVEL_OUTOF10: 10 - WORST POSSIBLE PAIN

## 2024-12-09 ASSESSMENT — ENCOUNTER SYMPTOMS
ABDOMINAL PAIN: 0
FATIGUE: 0
SHORTNESS OF BREATH: 0
DIFFICULTY URINATING: 0
FEVER: 0

## 2024-12-09 ASSESSMENT — PAIN - FUNCTIONAL ASSESSMENT
PAIN_FUNCTIONAL_ASSESSMENT: 0-10
PAIN_FUNCTIONAL_ASSESSMENT: 0-10

## 2024-12-09 ASSESSMENT — PAIN DESCRIPTION - LOCATION: LOCATION: LEG

## 2024-12-09 NOTE — PROGRESS NOTES
Batsheva Page is a 49 y.o. female on day 6 of admission presenting with Sepsis (Multi).    Subjective   Interval History: no fever, no new complaints        Review of Systems   Constitutional:  Negative for fatigue and fever.   Respiratory:  Negative for shortness of breath.    Cardiovascular:  Negative for chest pain.   Gastrointestinal:  Negative for abdominal pain.   Genitourinary:  Negative for difficulty urinating.       Objective   Range of Vitals (last 24 hours)  Heart Rate:  [61-69]   Temp:  [36.6 °C (97.9 °F)-37 °C (98.6 °F)]   Resp:  [19-21]   BP: ()/(62-74)   SpO2:  [94 %-96 %]   Daily Weight  12/06/24 : 71.9 kg (158 lb 9.6 oz)    Body mass index is 25.61 kg/m².    Physical Exam  Constitutional:       Appearance: Normal appearance.   HENT:      Head: Normocephalic and atraumatic.      Mouth/Throat:      Mouth: Mucous membranes are moist.      Pharynx: Oropharynx is clear.   Eyes:      Pupils: Pupils are equal, round, and reactive to light.   Cardiovascular:      Rate and Rhythm: Normal rate and regular rhythm.      Heart sounds: Normal heart sounds.   Pulmonary:      Effort: Pulmonary effort is normal.      Breath sounds: Normal breath sounds.      Comments: Rt sided line was removed  Abdominal:      General: Abdomen is flat. Bowel sounds are normal.      Palpations: Abdomen is soft.   Musculoskeletal:      Cervical back: Normal range of motion.   Neurological:      Mental Status: She is alert.         Antibiotics  ceftaroline (Teflaro) IV in 50 mL D5W  DAPTOmycin (Cubicin) IV in 50 mL NS    Relevant Results  Labs  Results from last 72 hours   Lab Units 12/09/24  0550 12/08/24  0540 12/07/24  0604   WBC AUTO x10*3/uL 7.4 7.1 5.2   HEMOGLOBIN g/dL 11.8* 12.2 11.6*   HEMATOCRIT % 39.7 40.6 39.0   PLATELETS AUTO x10*3/uL 199 199 177   NEUTROS PCT AUTO % 71.1 68.7 60.8   LYMPHS PCT AUTO % 14.1 14.2 17.6   MONOS PCT AUTO % 5.7 6.8 8.0   EOS PCT AUTO % 8.0 9.3 12.2     Results from last 72 hours    Lab Units 12/09/24  0550 12/08/24  0540 12/07/24  0604   SODIUM mmol/L 136 136 136   POTASSIUM mmol/L 4.5 4.4 4.0   CHLORIDE mmol/L 97* 97* 98   CO2 mmol/L 20* 21 22   BUN mg/dL 45* 39* 29*   CREATININE mg/dL 8.16* 6.89* 5.46*   GLUCOSE mg/dL 79 92 98   CALCIUM mg/dL 7.9* 7.8* 8.2*   ANION GAP mmol/L 24* 22* 20   EGFR mL/min/1.73m*2 6* 7* 9*   PHOSPHORUS mg/dL 5.6* 5.0* 4.0     Results from last 72 hours   Lab Units 12/09/24  0550 12/08/24  0540 12/07/24  0604   ALBUMIN g/dL 3.2* 3.2* 3.3*     Estimated Creatinine Clearance: 8.4 mL/min (A) (by C-G formula based on SCr of 8.16 mg/dL (H)).  C-Reactive Protein   Date Value Ref Range Status   12/03/2024 27.39 (H) <1.00 mg/dL Final     CRP   Date Value Ref Range Status   12/25/2022 9.90 (A) mg/dL Final     Comment:     REF VALUE  < 1.00     12/23/2022 23.46 (A) mg/dL Final     Comment:     REF VALUE  < 1.00       Microbiology  Susceptibility data from last 14 days.  Collected Specimen Info Organism Clindamycin Erythromycin Oxacillin Tetracycline Trimethoprim/Sulfamethoxazole Vancomycin   12/04/24 Blood culture from Dialysis Staphylococcus aureus         12/03/24 Swab from Anterior Nares Methicillin Susceptible Staphylococcus aureus (MSSA)         12/03/24 Blood culture from Dialysis Staphylococcus aureus         12/02/24 Blood culture from Peripheral Venipuncture Staphylococcus aureus         12/02/24 Blood culture from Peripheral Venipuncture Staphylococcus aureus         12/02/24 Blood culture from Peripheral Venipuncture Staphylococcus aureus         12/02/24 Blood culture from Peripheral Venipuncture Methicillin Resistant Staphylococcus aureus (MRSA)  S  S  R  S  S  S   Imaging  Reviewed       Assessment/Plan   49 y.o. 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 Blood cultures show gram positive cocci in clusters. Currently on  Vancomycin.     #Sepsis/bacteremia  #CLABSI  - She has AVR/MVR, MRSA +ve, LAURA w/ vegetation on the line s/p removal  - Continue Ceftaroline / Daptomycin  - Line placement by IR to be done possibly tomorrow    Rachid Lee MD  Internal Medicine, PGY-2          Attending note : the patient was evaluated, the note was reviewed and updated  Sepsis / bacteremia, likely dialysis line related, she has AVR / MVR, MRSA, the last positive BC 12/4, TTE without vegetations, LAURA with vegetation on the line sp removal   Atelectasis  Recommendations :  Continue  Ceftaroline / Daptomycin  Discussed with the medical team        I spent minutes in the professional and overall care of this patient.        Richard Gardiner MD

## 2024-12-09 NOTE — CARE PLAN
The patient's goals for the shift include      The clinical goals for the shift include patient will have better pain control

## 2024-12-09 NOTE — PROGRESS NOTES
"Batsheva Page is a 49 y.o. female on day 6 of admission presenting with Sepsis (Multi).    Subjective   Pt is complaining pain all over. She requesting sedation for dialysis line placement.        Objective     Physical Exam  Vitals and nursing note reviewed.   Constitutional:       General: She is not in acute distress.     Appearance: Normal appearance. She is not ill-appearing or toxic-appearing.   HENT:      Head: Normocephalic and atraumatic.      Nose: Nose normal.      Mouth/Throat:      Mouth: Mucous membranes are moist.   Eyes:      Extraocular Movements: Extraocular movements intact.      Conjunctiva/sclera: Conjunctivae normal.      Pupils: Pupils are equal, round, and reactive to light.   Cardiovascular:      Rate and Rhythm: Normal rate and regular rhythm.      Heart sounds: Murmur heard.      No gallop.   Pulmonary:      Effort: Pulmonary effort is normal. No respiratory distress.      Breath sounds: Normal breath sounds. No wheezing, rhonchi or rales.   Abdominal:      General: Abdomen is flat. Bowel sounds are normal. There is no distension.      Palpations: Abdomen is soft. There is no mass.      Tenderness: There is no abdominal tenderness.   Musculoskeletal:         General: No swelling or tenderness. Normal range of motion.      Cervical back: Normal range of motion and neck supple.   Skin:     General: Skin is warm and dry.   Neurological:      General: No focal deficit present.      Mental Status: She is alert and oriented to person, place, and time. Mental status is at baseline.   Psychiatric:         Mood and Affect: Mood normal.         Behavior: Behavior normal.         Thought Content: Thought content normal.         Judgment: Judgment normal.         Last Recorded Vitals:  /63   Pulse 61   Temp 36.7 °C (98.1 °F) (Temporal)   Resp 20   Ht 1.676 m (5' 5.98\")   Wt 71.9 kg (158 lb 9.6 oz)   SpO2 95%   BMI 25.61 kg/m²      Scheduled medications:  acetaminophen, 975 mg, " oral, q8h  aspirin, 81 mg, oral, Daily  atorvastatin, 40 mg, oral, Daily  calcitriol, 0.5 mcg, oral, Daily  ceftaroline, 200 mg, intravenous, q12h  daptomycin, 6 mg/kg (Adjusted), intravenous, q48h  diphenhydrAMINE, 12.5 mg, intravenous, Once  docusate sodium, 100 mg, oral, BID  ergocalciferol, 1,250 mcg, oral, Every Sunday  heparin, 1,900 Units, intra-catheter, After Dialysis  heparin, 1,900 Units, intra-catheter, After Dialysis  lactulose, 20 g, oral, TID  levETIRAcetam, 750 mg, oral, Nightly  metoprolol tartrate, 50 mg, oral, BID  pregabalin, 50 mg, oral, BID      Continuous medications:     PRN medications:  PRN medications: dextromethorphan-guaifenesin, diphenhydrAMINE, diphenhydrAMINE, diphenhydrAMINE, guaiFENesin, HYDROmorphone, hydrOXYzine HCL, oxyCODONE **OR** oxyCODONE, promethazine **OR** promethazine     Relevant Results:  Results for orders placed or performed during the hospital encounter of 12/03/24 (from the past 24 hours)   Renal Function Panel   Result Value Ref Range    Glucose 79 74 - 99 mg/dL    Sodium 136 136 - 145 mmol/L    Potassium 4.5 3.5 - 5.3 mmol/L    Chloride 97 (L) 98 - 107 mmol/L    Bicarbonate 20 (L) 21 - 32 mmol/L    Anion Gap 24 (H) 10 - 20 mmol/L    Urea Nitrogen 45 (H) 6 - 23 mg/dL    Creatinine 8.16 (H) 0.50 - 1.05 mg/dL    eGFR 6 (L) >60 mL/min/1.73m*2    Calcium 7.9 (L) 8.6 - 10.3 mg/dL    Phosphorus 5.6 (H) 2.5 - 4.9 mg/dL    Albumin 3.2 (L) 3.4 - 5.0 g/dL   Magnesium   Result Value Ref Range    Magnesium 1.80 1.60 - 2.40 mg/dL   CBC and Auto Differential   Result Value Ref Range    WBC 7.4 4.4 - 11.3 x10*3/uL    nRBC 0.0 0.0 - 0.0 /100 WBCs    RBC 4.27 4.00 - 5.20 x10*6/uL    Hemoglobin 11.8 (L) 12.0 - 16.0 g/dL    Hematocrit 39.7 36.0 - 46.0 %    MCV 93 80 - 100 fL    MCH 27.6 26.0 - 34.0 pg    MCHC 29.7 (L) 32.0 - 36.0 g/dL    RDW 16.5 (H) 11.5 - 14.5 %    Platelets 199 150 - 450 x10*3/uL    Neutrophils % 71.1 40.0 - 80.0 %    Immature Granulocytes %, Automated 0.7 0.0 -  0.9 %    Lymphocytes % 14.1 13.0 - 44.0 %    Monocytes % 5.7 2.0 - 10.0 %    Eosinophils % 8.0 0.0 - 6.0 %    Basophils % 0.4 0.0 - 2.0 %    Neutrophils Absolute 5.23 1.20 - 7.70 x10*3/uL    Immature Granulocytes Absolute, Automated 0.05 0.00 - 0.70 x10*3/uL    Lymphocytes Absolute 1.04 (L) 1.20 - 4.80 x10*3/uL    Monocytes Absolute 0.42 0.10 - 1.00 x10*3/uL    Eosinophils Absolute 0.59 0.00 - 0.70 x10*3/uL    Basophils Absolute 0.03 0.00 - 0.10 x10*3/uL       No results found.                   Assessment/Plan   Principal Problem:    Sepsis (Multi)    Batsheva Page is a 49 y.o. 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 Patient presented to the ED due to AMS fever (102oF) and rigor. Patient's  last HD session was 12/2/24 and the dialysis center [facility] blood cultures was positive for gram +ve cocci in clusters  In the ED were remarkable mainly for a wbc of 17.7. CXR was read as showing a developing right basal infiltrate.  She was given a dose each of vancomycin and zosyn and was transferred to [facility] for further care and management.      Recurrent MRSA bacteremia 2/2 infected dialysis line meeting sepsis for metabolic encephalopathy  Back to baseline mentation  -hx recurrent MRSA BSI 2/2 dialysis cath infections and aortic valve endocarditis requiring aortic and mitral valve replacements  -s/p LAURA (12/6/24) without endocarditis but positive for HC cath vegetation  -surgery removed HD line removal under sedation on 12/6/24 2/2 severe pain from scarring 2/2 multiple prior HD line.  - Hemodialysis is on hold.  - consulted IR to place dialysis cath under sedation, Dr Allen can possibly place it tomorrow  - ceftaroline  - daptomycin  - lyrica    ESRD on HD (MWF)   - nephro following  - holding dialysis  - calcitriol    Normocytic anemia  - monitor    RLL infiltrate on CXR,  asymptomatic    HTN  - metoprolol     Seizure  - keppra    HLD  - statin   - ASA    Chronic pruritus   - benadryl     Dispo: monitor clinically, continue IV abx, IR to place dialysis cath tomorrow         Neha Pulido MD  Hospitalist

## 2024-12-09 NOTE — SIGNIFICANT EVENT
Patient requested to be n.p.o. after midnight for dialysis catheter.  She is requesting to be sedated for the procedure.  Will keep her n.p.o.  Will need to verify who was going to place the catheter.

## 2024-12-09 NOTE — PROGRESS NOTES
12/09/24 1157   Discharge Planning   Living Arrangements Spouse/significant other   Support Systems Spouse/significant other;Children;Family members   Assistance Needed Patient is A&OX3, independent in ADLs, ambulates without assistive devices unless a lot of walking will use a cane, does not drive but family able to transport, [facility] W[facility] [place], no active HC agency, No O2, CPAP or Bipap at baseline.   Type of Residence Private residence   Number of Stairs to Enter Residence 4   Number of Stairs Within Residence 0   Do you have animals or pets at home? No   Who is requesting discharge planning? Provider   Home or Post Acute Services None   Expected Discharge Disposition Home  (denies any C needs. Dialysis line has been removed, currently on line holiday, needs dialysis line replaced prior to d/c)   Does the patient need discharge transport arranged? No   Stroke Family Assessment   Stroke Family Assessment Needed No   Intensity of Service   Intensity of Service 0-30 min

## 2024-12-10 ENCOUNTER — ANESTHESIA (OUTPATIENT)
Dept: CARDIOLOGY | Facility: HOSPITAL | Age: 49
End: 2024-12-10
Payer: MEDICARE

## 2024-12-10 ENCOUNTER — ANESTHESIA EVENT (OUTPATIENT)
Dept: CARDIOLOGY | Facility: HOSPITAL | Age: 49
End: 2024-12-10
Payer: MEDICARE

## 2024-12-10 ENCOUNTER — APPOINTMENT (OUTPATIENT)
Dept: CARDIOLOGY | Facility: HOSPITAL | Age: 49
End: 2024-12-10
Payer: MEDICARE

## 2024-12-10 LAB
ALBUMIN SERPL BCP-MCNC: 3.5 G/DL (ref 3.4–5)
ALP SERPL-CCNC: 154 U/L (ref 33–110)
ALT SERPL W P-5'-P-CCNC: 9 U/L (ref 7–45)
ANION GAP SERPL CALC-SCNC: 28 MMOL/L (ref 10–20)
APTT PPP: 39 SECONDS (ref 27–38)
AST SERPL W P-5'-P-CCNC: 15 U/L (ref 9–39)
BACTERIA BLD CULT: NORMAL
BACTERIA BLD CULT: NORMAL
BASOPHILS # BLD AUTO: 0.05 X10*3/UL (ref 0–0.1)
BASOPHILS NFR BLD AUTO: 0.6 %
BILIRUB SERPL-MCNC: 0.5 MG/DL (ref 0–1.2)
BUN SERPL-MCNC: 55 MG/DL (ref 6–23)
CALCIUM SERPL-MCNC: 7.6 MG/DL (ref 8.6–10.3)
CHLORIDE SERPL-SCNC: 97 MMOL/L (ref 98–107)
CO2 SERPL-SCNC: 15 MMOL/L (ref 21–32)
CREAT SERPL-MCNC: 9.33 MG/DL (ref 0.5–1.05)
EGFRCR SERPLBLD CKD-EPI 2021: 5 ML/MIN/1.73M*2
EOSINOPHIL # BLD AUTO: 0.62 X10*3/UL (ref 0–0.7)
EOSINOPHIL NFR BLD AUTO: 7.9 %
ERYTHROCYTE [DISTWIDTH] IN BLOOD BY AUTOMATED COUNT: 16.3 % (ref 11.5–14.5)
ERYTHROCYTE [DISTWIDTH] IN BLOOD BY AUTOMATED COUNT: 16.5 % (ref 11.5–14.5)
GLUCOSE SERPL-MCNC: 78 MG/DL (ref 74–99)
HCT VFR BLD AUTO: 37.7 % (ref 36–46)
HCT VFR BLD AUTO: 39.6 % (ref 36–46)
HGB BLD-MCNC: 11.6 G/DL (ref 12–16)
HGB BLD-MCNC: 11.9 G/DL (ref 12–16)
IMM GRANULOCYTES # BLD AUTO: 0.07 X10*3/UL (ref 0–0.7)
IMM GRANULOCYTES NFR BLD AUTO: 0.9 % (ref 0–0.9)
INR PPP: 1.4 (ref 0.9–1.1)
LYMPHOCYTES # BLD AUTO: 1.02 X10*3/UL (ref 1.2–4.8)
LYMPHOCYTES NFR BLD AUTO: 13 %
MAGNESIUM SERPL-MCNC: 1.87 MG/DL (ref 1.6–2.4)
MCH RBC QN AUTO: 27.4 PG (ref 26–34)
MCH RBC QN AUTO: 27.8 PG (ref 26–34)
MCHC RBC AUTO-ENTMCNC: 30.1 G/DL (ref 32–36)
MCHC RBC AUTO-ENTMCNC: 30.8 G/DL (ref 32–36)
MCV RBC AUTO: 90 FL (ref 80–100)
MCV RBC AUTO: 91 FL (ref 80–100)
MONOCYTES # BLD AUTO: 0.41 X10*3/UL (ref 0.1–1)
MONOCYTES NFR BLD AUTO: 5.2 %
NEUTROPHILS # BLD AUTO: 5.67 X10*3/UL (ref 1.2–7.7)
NEUTROPHILS NFR BLD AUTO: 72.4 %
NRBC BLD-RTO: 0 /100 WBCS (ref 0–0)
NRBC BLD-RTO: 0 /100 WBCS (ref 0–0)
PLATELET # BLD AUTO: 201 X10*3/UL (ref 150–450)
PLATELET # BLD AUTO: 210 X10*3/UL (ref 150–450)
POTASSIUM SERPL-SCNC: 4.8 MMOL/L (ref 3.5–5.3)
PROT SERPL-MCNC: 7.9 G/DL (ref 6.4–8.2)
PROTHROMBIN TIME: 15.6 SECONDS (ref 9.8–12.8)
RBC # BLD AUTO: 4.18 X10*6/UL (ref 4–5.2)
RBC # BLD AUTO: 4.34 X10*6/UL (ref 4–5.2)
SODIUM SERPL-SCNC: 135 MMOL/L (ref 136–145)
WBC # BLD AUTO: 7.8 X10*3/UL (ref 4.4–11.3)
WBC # BLD AUTO: 8.4 X10*3/UL (ref 4.4–11.3)

## 2024-12-10 PROCEDURE — 2500000004 HC RX 250 GENERAL PHARMACY W/ HCPCS (ALT 636 FOR OP/ED): Performed by: INTERNAL MEDICINE

## 2024-12-10 PROCEDURE — C1750 CATH, HEMODIALYSIS,LONG-TERM: HCPCS

## 2024-12-10 PROCEDURE — 36561 INSERT TUNNELED CV CATH: CPT | Mod: RT

## 2024-12-10 PROCEDURE — 99233 SBSQ HOSP IP/OBS HIGH 50: CPT | Performed by: STUDENT IN AN ORGANIZED HEALTH CARE EDUCATION/TRAINING PROGRAM

## 2024-12-10 PROCEDURE — 2500000004 HC RX 250 GENERAL PHARMACY W/ HCPCS (ALT 636 FOR OP/ED): Performed by: ANESTHESIOLOGY

## 2024-12-10 PROCEDURE — 36556 INSERT NON-TUNNEL CV CATH: CPT

## 2024-12-10 PROCEDURE — 83735 ASSAY OF MAGNESIUM: CPT | Performed by: INTERNAL MEDICINE

## 2024-12-10 PROCEDURE — 2060000001 HC INTERMEDIATE ICU ROOM DAILY

## 2024-12-10 PROCEDURE — 80053 COMPREHEN METABOLIC PANEL: CPT | Performed by: STUDENT IN AN ORGANIZED HEALTH CARE EDUCATION/TRAINING PROGRAM

## 2024-12-10 PROCEDURE — 76942 ECHO GUIDE FOR BIOPSY: CPT

## 2024-12-10 PROCEDURE — 36415 COLL VENOUS BLD VENIPUNCTURE: CPT | Performed by: INTERNAL MEDICINE

## 2024-12-10 PROCEDURE — 85027 COMPLETE CBC AUTOMATED: CPT | Performed by: INTERNAL MEDICINE

## 2024-12-10 PROCEDURE — 2720000007 HC OR 272 NO HCPCS

## 2024-12-10 PROCEDURE — 85730 THROMBOPLASTIN TIME PARTIAL: CPT | Performed by: INTERNAL MEDICINE

## 2024-12-10 PROCEDURE — 2500000004 HC RX 250 GENERAL PHARMACY W/ HCPCS (ALT 636 FOR OP/ED): Performed by: RADIOLOGY

## 2024-12-10 PROCEDURE — 2500000001 HC RX 250 WO HCPCS SELF ADMINISTERED DRUGS (ALT 637 FOR MEDICARE OP): Performed by: INTERNAL MEDICINE

## 2024-12-10 PROCEDURE — 3700000001 HC GENERAL ANESTHESIA TIME - INITIAL BASE CHARGE

## 2024-12-10 PROCEDURE — 3700000002 HC GENERAL ANESTHESIA TIME - EACH INCREMENTAL 1 MINUTE

## 2024-12-10 PROCEDURE — 85025 COMPLETE CBC W/AUTO DIFF WBC: CPT | Performed by: INTERNAL MEDICINE

## 2024-12-10 PROCEDURE — 0JHL3XZ INSERTION OF TUNNELED VASCULAR ACCESS DEVICE INTO RIGHT UPPER LEG SUBCUTANEOUS TISSUE AND FASCIA, PERCUTANEOUS APPROACH: ICD-10-PCS | Performed by: RADIOLOGY

## 2024-12-10 PROCEDURE — 2500000001 HC RX 250 WO HCPCS SELF ADMINISTERED DRUGS (ALT 637 FOR MEDICARE OP): Performed by: NURSE PRACTITIONER

## 2024-12-10 PROCEDURE — 85610 PROTHROMBIN TIME: CPT | Performed by: INTERNAL MEDICINE

## 2024-12-10 PROCEDURE — C1769 GUIDE WIRE: HCPCS

## 2024-12-10 PROCEDURE — C1894 INTRO/SHEATH, NON-LASER: HCPCS

## 2024-12-10 PROCEDURE — 94760 N-INVAS EAR/PLS OXIMETRY 1: CPT

## 2024-12-10 PROCEDURE — 36556 INSERT NON-TUNNEL CV CATH: CPT | Performed by: RADIOLOGY

## 2024-12-10 PROCEDURE — 2500000004 HC RX 250 GENERAL PHARMACY W/ HCPCS (ALT 636 FOR OP/ED): Performed by: NURSE ANESTHETIST, CERTIFIED REGISTERED

## 2024-12-10 PROCEDURE — 06H033Z INSERTION OF INFUSION DEVICE INTO INFERIOR VENA CAVA, PERCUTANEOUS APPROACH: ICD-10-PCS | Performed by: RADIOLOGY

## 2024-12-10 RX ORDER — ONDANSETRON HYDROCHLORIDE 2 MG/ML
8 INJECTION, SOLUTION INTRAVENOUS ONCE
Status: DISCONTINUED | OUTPATIENT
Start: 2024-12-10 | End: 2024-12-12 | Stop reason: HOSPADM

## 2024-12-10 RX ORDER — PROPOFOL 10 MG/ML
INJECTION, EMULSION INTRAVENOUS AS NEEDED
Status: DISCONTINUED | OUTPATIENT
Start: 2024-12-10 | End: 2024-12-10

## 2024-12-10 RX ORDER — LIDOCAINE HYDROCHLORIDE 20 MG/ML
INJECTION, SOLUTION INFILTRATION; PERINEURAL AS NEEDED
Status: DISCONTINUED | OUTPATIENT
Start: 2024-12-10 | End: 2024-12-10 | Stop reason: HOSPADM

## 2024-12-10 RX ORDER — HYDRALAZINE HYDROCHLORIDE 20 MG/ML
5 INJECTION INTRAMUSCULAR; INTRAVENOUS ONCE
Status: COMPLETED | OUTPATIENT
Start: 2024-12-10 | End: 2024-12-10

## 2024-12-10 RX ORDER — ALBUTEROL SULFATE 0.83 MG/ML
2.5 SOLUTION RESPIRATORY (INHALATION) ONCE AS NEEDED
Status: DISCONTINUED | OUTPATIENT
Start: 2024-12-10 | End: 2024-12-10

## 2024-12-10 RX ORDER — LIDOCAINE HCL/PF 100 MG/5ML
SYRINGE (ML) INTRAVENOUS AS NEEDED
Status: DISCONTINUED | OUTPATIENT
Start: 2024-12-10 | End: 2024-12-10

## 2024-12-10 RX ORDER — HEPARIN SODIUM 1000 [USP'U]/ML
INJECTION, SOLUTION INTRAVENOUS; SUBCUTANEOUS AS NEEDED
Status: DISCONTINUED | OUTPATIENT
Start: 2024-12-10 | End: 2024-12-10 | Stop reason: HOSPADM

## 2024-12-10 RX ORDER — ACETAMINOPHEN 325 MG/1
650 TABLET ORAL EVERY 4 HOURS PRN
Status: DISCONTINUED | OUTPATIENT
Start: 2024-12-10 | End: 2024-12-12 | Stop reason: HOSPADM

## 2024-12-10 SDOH — HEALTH STABILITY: MENTAL HEALTH: CURRENT SMOKER: 0

## 2024-12-10 ASSESSMENT — COGNITIVE AND FUNCTIONAL STATUS - GENERAL
DAILY ACTIVITIY SCORE: 24
MOBILITY SCORE: 24

## 2024-12-10 ASSESSMENT — PAIN - FUNCTIONAL ASSESSMENT
PAIN_FUNCTIONAL_ASSESSMENT: 0-10

## 2024-12-10 ASSESSMENT — PAIN DESCRIPTION - LOCATION
LOCATION: GENERALIZED

## 2024-12-10 ASSESSMENT — PAIN SCALES - GENERAL
PAINLEVEL_OUTOF10: 10 - WORST POSSIBLE PAIN
PAINLEVEL_OUTOF10: 10 - WORST POSSIBLE PAIN
PAINLEVEL_OUTOF10: 5 - MODERATE PAIN
PAIN_LEVEL: 2
PAINLEVEL_OUTOF10: 10 - WORST POSSIBLE PAIN
PAINLEVEL_OUTOF10: 0 - NO PAIN
PAINLEVEL_OUTOF10: 10 - WORST POSSIBLE PAIN

## 2024-12-10 ASSESSMENT — ENCOUNTER SYMPTOMS
ABDOMINAL PAIN: 0
FATIGUE: 0
SHORTNESS OF BREATH: 0
DIFFICULTY URINATING: 0
FEVER: 0

## 2024-12-10 NOTE — CARE PLAN
Problem: Fall/Injury  Goal: Not fall by end of shift  Outcome: Progressing     Problem: Pain - Adult  Goal: Verbalizes/displays adequate comfort level or baseline comfort level  Outcome: Progressing   The patient's goals for the shift include      The clinical goals for the shift include patient will state a decrease in pain level this shift  Patient alert and oriented, but constantly complaining of generalized pain. Dilaudid given. Patient to be NPO at midnight for HD cath placement in AM

## 2024-12-10 NOTE — NURSING NOTE
Pt noted to have bleeding from R groin HD cath site. Dressing removed, area cleaned and rufino/tegaderm/4x4's with tape applied over site. Will continue to monitor. Dr Pulido made aware.

## 2024-12-10 NOTE — POST-PROCEDURE NOTE
Interventional Radiology Brief Postprocedure Note    Attending: Zachery Allen MD      Assistant: none    Diagnosis: esrd    Description of procedure: tdc placement     Anesthesia:  MAC    Complications: None    Estimated Blood Loss: minimal    No access in the neck identified  Right femoral TDC placed      See detailed result report with images in PACS.    The patient tolerated the procedure well without incident or complication.

## 2024-12-10 NOTE — CARE PLAN
The clinical goals for the shift include Pt will have HD line placed and receive treatment today

## 2024-12-10 NOTE — PROGRESS NOTES
"Batsheva Page is a 49 y.o. female on day 7 of admission presenting with Sepsis (Multi).    Subjective   Pt is complaining pain all over. She is more jittery today.        Objective     Physical Exam  Vitals and nursing note reviewed.   Constitutional:       General: She is not in acute distress.     Appearance: Normal appearance. She is not ill-appearing or toxic-appearing.   HENT:      Head: Normocephalic and atraumatic.      Nose: Nose normal.      Mouth/Throat:      Mouth: Mucous membranes are moist.   Eyes:      Extraocular Movements: Extraocular movements intact.      Conjunctiva/sclera: Conjunctivae normal.      Pupils: Pupils are equal, round, and reactive to light.   Cardiovascular:      Rate and Rhythm: Normal rate and regular rhythm.      Heart sounds: Murmur heard.      No gallop.   Pulmonary:      Effort: Pulmonary effort is normal. No respiratory distress.      Breath sounds: Normal breath sounds. No wheezing, rhonchi or rales.   Abdominal:      General: Abdomen is flat. Bowel sounds are normal. There is no distension.      Palpations: Abdomen is soft. There is no mass.      Tenderness: There is no abdominal tenderness.   Musculoskeletal:         General: No swelling or tenderness. Normal range of motion.      Cervical back: Normal range of motion and neck supple.   Skin:     General: Skin is warm and dry.   Neurological:      Mental Status: She is alert and oriented to person, place, and time.      Comments: Lots of jitter/tremor in all extremities   Psychiatric:         Mood and Affect: Mood normal.         Behavior: Behavior normal.         Thought Content: Thought content normal.         Judgment: Judgment normal.         Last Recorded Vitals:  /75 (BP Location: Right leg, Patient Position: Lying)   Pulse 65   Temp 36.8 °C (98.2 °F) (Temporal)   Resp 18   Ht 1.676 m (5' 5.98\")   Wt 71.9 kg (158 lb 9.6 oz)   SpO2 92%   BMI 25.61 kg/m²      Scheduled medications:  acetaminophen, " 975 mg, oral, q8h  aspirin, 81 mg, oral, Daily  atorvastatin, 40 mg, oral, Daily  calcitriol, 0.5 mcg, oral, Daily  ceftaroline, 200 mg, intravenous, q12h  daptomycin, 6 mg/kg (Adjusted), intravenous, q48h  diphenhydrAMINE, 12.5 mg, intravenous, Once  docusate sodium, 100 mg, oral, BID  ergocalciferol, 1,250 mcg, oral, Every Sunday  heparin, 1,900 Units, intra-catheter, After Dialysis  heparin, 1,900 Units, intra-catheter, After Dialysis  lactulose, 20 g, oral, TID  levETIRAcetam, 750 mg, oral, Nightly  metoprolol tartrate, 50 mg, oral, BID  pregabalin, 50 mg, oral, BID      Continuous medications:     PRN medications:  PRN medications: dextromethorphan-guaifenesin, diphenhydrAMINE, diphenhydrAMINE, diphenhydrAMINE, guaiFENesin, HYDROmorphone, hydrOXYzine HCL, oxyCODONE **OR** oxyCODONE, promethazine **OR** promethazine     Relevant Results:  Results for orders placed or performed during the hospital encounter of 12/03/24 (from the past 24 hours)   Magnesium   Result Value Ref Range    Magnesium 1.87 1.60 - 2.40 mg/dL   CBC and Auto Differential   Result Value Ref Range    WBC 7.8 4.4 - 11.3 x10*3/uL    nRBC 0.0 0.0 - 0.0 /100 WBCs    RBC 4.34 4.00 - 5.20 x10*6/uL    Hemoglobin 11.9 (L) 12.0 - 16.0 g/dL    Hematocrit 39.6 36.0 - 46.0 %    MCV 91 80 - 100 fL    MCH 27.4 26.0 - 34.0 pg    MCHC 30.1 (L) 32.0 - 36.0 g/dL    RDW 16.3 (H) 11.5 - 14.5 %    Platelets 210 150 - 450 x10*3/uL    Neutrophils % 72.4 40.0 - 80.0 %    Immature Granulocytes %, Automated 0.9 0.0 - 0.9 %    Lymphocytes % 13.0 13.0 - 44.0 %    Monocytes % 5.2 2.0 - 10.0 %    Eosinophils % 7.9 0.0 - 6.0 %    Basophils % 0.6 0.0 - 2.0 %    Neutrophils Absolute 5.67 1.20 - 7.70 x10*3/uL    Immature Granulocytes Absolute, Automated 0.07 0.00 - 0.70 x10*3/uL    Lymphocytes Absolute 1.02 (L) 1.20 - 4.80 x10*3/uL    Monocytes Absolute 0.41 0.10 - 1.00 x10*3/uL    Eosinophils Absolute 0.62 0.00 - 0.70 x10*3/uL    Basophils Absolute 0.05 0.00 - 0.10 x10*3/uL    Comprehensive Metabolic Panel   Result Value Ref Range    Glucose 78 74 - 99 mg/dL    Sodium 135 (L) 136 - 145 mmol/L    Potassium 4.8 3.5 - 5.3 mmol/L    Chloride 97 (L) 98 - 107 mmol/L    Bicarbonate 15 (L) 21 - 32 mmol/L    Anion Gap 28 (H) 10 - 20 mmol/L    Urea Nitrogen 55 (H) 6 - 23 mg/dL    Creatinine 9.33 (H) 0.50 - 1.05 mg/dL    eGFR 5 (L) >60 mL/min/1.73m*2    Calcium 7.6 (L) 8.6 - 10.3 mg/dL    Albumin 3.5 3.4 - 5.0 g/dL    Alkaline Phosphatase 154 (H) 33 - 110 U/L    Total Protein 7.9 6.4 - 8.2 g/dL    AST 15 9 - 39 U/L    Bilirubin, Total 0.5 0.0 - 1.2 mg/dL    ALT 9 7 - 45 U/L       No results found.                   Assessment/Plan   Principal Problem:    Sepsis (Multi)    Batsheva Page is a 49 y.o. 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 Patient presented to the ED due to AMS fever (102oF) and rigor. Patient's  last HD session was 12/2/24 and the dialysis center [facility] blood cultures was positive for gram +ve cocci in clusters  In the ED were remarkable mainly for a wbc of 17.7. CXR was read as showing a developing right basal infiltrate.  She was given a dose each of vancomycin and zosyn and was transferred to [facility] for further care and management.      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  - Hemodialysis is on hold until new line placement  - consulted IR to place dialysis cath under sedation, Dr Allen can  possibly place it today  - ceftaroline  - daptomycin  - lyrica    ESRD on HD (MWF)  Cr is worsening  - nephro following  - dialysis when there is a new line  - calcitriol    Normocytic anemia  - monitor    RLL infiltrate on CXR, asymptomatic    HTN  - metoprolol     Seizure  - keppra    HLD  - statin   - ASA    Chronic pruritus   - benadryl     Dispo: monitor clinically, continue IV abx, IR to place dialysis cath today         Neha Pulido MD  Hospitalist

## 2024-12-10 NOTE — ANESTHESIA PROCEDURE NOTES
Airway  Date/Time: 12/10/2024 2:54 PM  Urgency: elective    Airway not difficult    Staffing  Performed: CRNA   Authorized by: Antonio Cr MD    Performed by: ORI Samano-TINO  Patient location during procedure: OR    Indications and Patient Condition  Indications for airway management: anesthesia  Spontaneous Ventilation: absent  Sedation level: deep  Preoxygenated: yes  Mask difficulty assessment: 0 - not attempted    Final Airway Details  Final airway type: supraglottic airway      Successful airway: Supraglottic airway: iGel.  Size 4     Number of attempts at approach: 1

## 2024-12-10 NOTE — PROGRESS NOTES
Batsheva Page is a 49 y.o. female on day 7 of admission presenting with Sepsis (Multi).      Subjective   No new issues. Plan for TDC  today. Reviewed with IR - will require a groin line       Objective   Awake and laert       Vitals 24HR  Heart Rate:  [64-65]   Temp:  [36.5 °C (97.7 °F)-37 °C (98.6 °F)]   Resp:  [18-19]   BP: (136-159)/(67-76)   SpO2:  [92 %-95 %]     Intake/Output last 3 Shifts:    Intake/Output Summary (Last 24 hours) at 12/10/2024 0940  Last data filed at 12/10/2024 0227  Gross per 24 hour   Intake 50 ml   Output --   Net 50 ml       Physical Exam    Relevant Results               Assessment/Plan      ESRD on HD  HTNsive CKD  CLABSI        Assessment & Plan  Sepsis (Multi)    HD today 2hrs then resume MWF. Will need to clarify home going Abx. I will tpuch base with her chronic HD unit re possible dapto          I spent 35 minutes in the professional and overall care of this patient.      Juliane Richardson MD

## 2024-12-10 NOTE — PROGRESS NOTES
12/10/24 1321   Discharge Planning   Expected Discharge Disposition Home  (Home with spouse, denies any HHC needs, Dialysis M/W/F,  new dialysis line to be placed 12/10, ADOD 12/11, A&0x4, room air)

## 2024-12-10 NOTE — PROGRESS NOTES
Batsheva Page is a 49 y.o. female on day 7 of admission presenting with Sepsis (Multi).    Subjective   Interval History: no fever, no new complaints        Review of Systems   Constitutional:  Negative for fatigue and fever.   Respiratory:  Negative for shortness of breath.    Cardiovascular:  Negative for chest pain.   Gastrointestinal:  Negative for abdominal pain.   Genitourinary:  Negative for difficulty urinating.       Objective   Range of Vitals (last 24 hours)  Heart Rate:  [65]   Temp:  [36.5 °C (97.7 °F)-36.9 °C (98.4 °F)]   Resp:  [18]   BP: (144-159)/(67-76)   SpO2:  [92 %-95 %]   Daily Weight  12/06/24 : 71.9 kg (158 lb 9.6 oz)    Body mass index is 25.61 kg/m².    Physical Exam  Constitutional:       Appearance: Normal appearance.   HENT:      Head: Normocephalic and atraumatic.      Mouth/Throat:      Mouth: Mucous membranes are moist.      Pharynx: Oropharynx is clear.   Eyes:      Pupils: Pupils are equal, round, and reactive to light.   Cardiovascular:      Rate and Rhythm: Normal rate and regular rhythm.      Heart sounds: Normal heart sounds.   Pulmonary:      Effort: Pulmonary effort is normal.      Breath sounds: Normal breath sounds.      Comments: Rt sided line was removed  Abdominal:      General: Abdomen is flat. Bowel sounds are normal.      Palpations: Abdomen is soft.   Musculoskeletal:      Cervical back: Normal range of motion.   Neurological:      Mental Status: She is alert.         Antibiotics  ceftaroline (Teflaro) IV in 50 mL D5W  DAPTOmycin (Cubicin) IV in 50 mL NS    Relevant Results  Labs  Results from last 72 hours   Lab Units 12/10/24  0527 12/09/24  0550 12/08/24  0540   WBC AUTO x10*3/uL 7.8 7.4 7.1   HEMOGLOBIN g/dL 11.9* 11.8* 12.2   HEMATOCRIT % 39.6 39.7 40.6   PLATELETS AUTO x10*3/uL 210 199 199   NEUTROS PCT AUTO % 72.4 71.1 68.7   LYMPHS PCT AUTO % 13.0 14.1 14.2   MONOS PCT AUTO % 5.2 5.7 6.8   EOS PCT AUTO % 7.9 8.0 9.3     Results from last 72 hours   Lab  Units 12/10/24  0527 12/09/24  0550 12/08/24  0540   SODIUM mmol/L 135* 136 136   POTASSIUM mmol/L 4.8 4.5 4.4   CHLORIDE mmol/L 97* 97* 97*   CO2 mmol/L 15* 20* 21   BUN mg/dL 55* 45* 39*   CREATININE mg/dL 9.33* 8.16* 6.89*   GLUCOSE mg/dL 78 79 92   CALCIUM mg/dL 7.6* 7.9* 7.8*   ANION GAP mmol/L 28* 24* 22*   EGFR mL/min/1.73m*2 5* 6* 7*   PHOSPHORUS mg/dL  --  5.6* 5.0*     Results from last 72 hours   Lab Units 12/10/24  0527 12/09/24  0550 12/08/24  0540   ALK PHOS U/L 154*  --   --    BILIRUBIN TOTAL mg/dL 0.5  --   --    PROTEIN TOTAL g/dL 7.9  --   --    ALT U/L 9  --   --    AST U/L 15  --   --    ALBUMIN g/dL 3.5 3.2* 3.2*     Estimated Creatinine Clearance: 7.3 mL/min (A) (by C-G formula based on SCr of 9.33 mg/dL (H)).  C-Reactive Protein   Date Value Ref Range Status   12/03/2024 27.39 (H) <1.00 mg/dL Final     CRP   Date Value Ref Range Status   12/25/2022 9.90 (A) mg/dL Final     Comment:     REF VALUE  < 1.00     12/23/2022 23.46 (A) mg/dL Final     Comment:     REF VALUE  < 1.00       Microbiology  Susceptibility data from last 14 days.  Collected Specimen Info Organism Clindamycin Erythromycin Oxacillin Tetracycline Trimethoprim/Sulfamethoxazole Vancomycin   12/04/24 Blood culture from Dialysis Staphylococcus aureus         12/03/24 Swab from Anterior Nares Methicillin Susceptible Staphylococcus aureus (MSSA)         12/03/24 Blood culture from Dialysis Staphylococcus aureus         12/02/24 Blood culture from Peripheral Venipuncture Staphylococcus aureus         12/02/24 Blood culture from Peripheral Venipuncture Staphylococcus aureus         12/02/24 Blood culture from Peripheral Venipuncture Staphylococcus aureus         12/02/24 Blood culture from Peripheral Venipuncture Methicillin Resistant Staphylococcus aureus (MRSA)  S  S  R  S  S  S   Imaging  Reviewed       Assessment/Plan   49 y.o. female w/ PMH of ESRD on HD (M,W,F), multiple line associated infections and endocarditis s/p  bioprosthetic valve replacements presented with symptoms of sepsis likely 2/2 CLABSI. Blood cultures show gram positive cocci in clusters. Currently on Vancomycin.     #Sepsis/bacteremia  #CLABSI  - She has AVR/MVR, MRSA +ve, LAURA w/ vegetation on the line s/p removal  - Continue Ceftaroline / Daptomycin  - Line placement by IR to be done today  - Can be discharged with IV vancomycin after line placement    Rachid Lee MD  Internal Medicine, PGY-2          Attending note : the patient was evaluated, the note was reviewed and updated  Sepsis / bacteremia, likely dialysis line related, she has AVR / MVR, MRSA, the last positive BC 12/4, TTE without vegetations, LAURA with vegetation on the line sp removal   Atelectasis  Recommendations :  Continue Daptomycin, plan on Vancomycin with dialysis for 6 weeks from 12/5  Discussed with the medical team        I spent minutes in the professional and overall care of this patient.        Richard Gardiner MD

## 2024-12-10 NOTE — ANESTHESIA POSTPROCEDURE EVALUATION
Patient: Batsheva Page    Procedure Summary       Date: 12/10/24 Room / Location: [facility]    Anesthesia Start: 1443 Anesthesia Stop: 1532    Procedure: IR CVC TUNNELED Diagnosis: (need dialysis cath)    Scheduled Providers: Zachery Allen MD Responsible Provider: Antonio Cr MD    Anesthesia Type: general ASA Status: 4            Anesthesia Type: general    Vitals Value Taken Time   /64 12/10/24 1550   Temp  12/10/24 1725   Pulse 62 12/10/24 1550   Resp 26 12/10/24 1550   SpO2 93 % 12/10/24 1538       Anesthesia Post Evaluation    Patient location during evaluation: PACU  Patient participation: complete - patient participated  Level of consciousness: awake  Pain score: 2  Pain management: adequate  Multimodal analgesia pain management approach  Airway patency: patent  Two or more strategies used to mitigate risk of obstructive sleep apnea  Cardiovascular status: acceptable  Respiratory status: acceptable  Hydration status: acceptable  Postoperative Nausea and Vomiting: none      No notable events documented.

## 2024-12-10 NOTE — PROGRESS NOTES
Batsheva Page is a 49 y.o. female on day 7 of admission presenting with Sepsis (Multi).      Subjective   Following for ESRD. Here for CLABSI. Gets HD ar [facility] under Dr Pedersen. Has no c/o       Objective   Looks okay. Awake and alert and conversant. No edema       Vitals 24HR  Heart Rate:  [64-65]   Temp:  [36.5 °C (97.7 °F)-37 °C (98.6 °F)]   Resp:  [18-19]   BP: (136-159)/(67-76)   SpO2:  [92 %-95 %]       Intake/Output last 3 Shifts:    Intake/Output Summary (Last 24 hours) at 12/10/2024 0912  Last data filed at 12/10/2024 0227  Gross per 24 hour   Intake 50 ml   Output --   Net 50 ml       Physical Exam    Relevant Results               Assessment/Plan              Assessment & Plan  Sepsis (Multi)    ESRD on HD  HTNsive CKD  CLABSI    - HD when line available - no urgent need today  - D/w Dr Ashok Richardson MD

## 2024-12-11 ENCOUNTER — APPOINTMENT (OUTPATIENT)
Dept: DIALYSIS | Facility: HOSPITAL | Age: 49
End: 2024-12-11
Payer: MEDICARE

## 2024-12-11 LAB
ALBUMIN SERPL BCP-MCNC: 3.5 G/DL (ref 3.4–5)
ALP SERPL-CCNC: 178 U/L (ref 33–110)
ALT SERPL W P-5'-P-CCNC: 10 U/L (ref 7–45)
ANION GAP SERPL CALC-SCNC: 33 MMOL/L (ref 10–20)
AST SERPL W P-5'-P-CCNC: 20 U/L (ref 9–39)
ATRIAL RATE: 92 BPM
BASOPHILS # BLD AUTO: 0.04 X10*3/UL (ref 0–0.1)
BASOPHILS NFR BLD AUTO: 0.4 %
BILIRUB SERPL-MCNC: 0.5 MG/DL (ref 0–1.2)
BUN SERPL-MCNC: 67 MG/DL (ref 6–23)
CALCIUM SERPL-MCNC: 7.3 MG/DL (ref 8.6–10.3)
CHLORIDE SERPL-SCNC: 93 MMOL/L (ref 98–107)
CO2 SERPL-SCNC: 11 MMOL/L (ref 21–32)
CREAT SERPL-MCNC: 10.54 MG/DL (ref 0.5–1.05)
EGFRCR SERPLBLD CKD-EPI 2021: 4 ML/MIN/1.73M*2
EOSINOPHIL # BLD AUTO: 0.21 X10*3/UL (ref 0–0.7)
EOSINOPHIL NFR BLD AUTO: 2.3 %
ERYTHROCYTE [DISTWIDTH] IN BLOOD BY AUTOMATED COUNT: 16.7 % (ref 11.5–14.5)
GLUCOSE SERPL-MCNC: 62 MG/DL (ref 74–99)
HCT VFR BLD AUTO: 38 % (ref 36–46)
HGB BLD-MCNC: 11.4 G/DL (ref 12–16)
IMM GRANULOCYTES # BLD AUTO: 0.06 X10*3/UL (ref 0–0.7)
IMM GRANULOCYTES NFR BLD AUTO: 0.7 % (ref 0–0.9)
LYMPHOCYTES # BLD AUTO: 0.88 X10*3/UL (ref 1.2–4.8)
LYMPHOCYTES NFR BLD AUTO: 9.8 %
MAGNESIUM SERPL-MCNC: 2.29 MG/DL (ref 1.6–2.4)
MCH RBC QN AUTO: 27.5 PG (ref 26–34)
MCHC RBC AUTO-ENTMCNC: 30 G/DL (ref 32–36)
MCV RBC AUTO: 92 FL (ref 80–100)
MONOCYTES # BLD AUTO: 0.43 X10*3/UL (ref 0.1–1)
MONOCYTES NFR BLD AUTO: 4.8 %
NEUTROPHILS # BLD AUTO: 7.39 X10*3/UL (ref 1.2–7.7)
NEUTROPHILS NFR BLD AUTO: 82 %
NRBC BLD-RTO: 0 /100 WBCS (ref 0–0)
P AXIS: 51 DEGREES
PLATELET # BLD AUTO: 205 X10*3/UL (ref 150–450)
POTASSIUM SERPL-SCNC: 6.2 MMOL/L (ref 3.5–5.3)
PR INTERVAL: 144 MS
PROT SERPL-MCNC: 7.6 G/DL (ref 6.4–8.2)
Q ONSET: 221 MS
QRS COUNT: 15 BEATS
QRS DURATION: 74 MS
QT INTERVAL: 338 MS
QTC CALCULATION(BAZETT): 417 MS
QTC FREDERICIA: 389 MS
R AXIS: -8 DEGREES
RBC # BLD AUTO: 4.14 X10*6/UL (ref 4–5.2)
SODIUM SERPL-SCNC: 131 MMOL/L (ref 136–145)
T AXIS: 53 DEGREES
T OFFSET: 390 MS
VENTRICULAR RATE: 92 BPM
WBC # BLD AUTO: 9 X10*3/UL (ref 4.4–11.3)

## 2024-12-11 PROCEDURE — 36415 COLL VENOUS BLD VENIPUNCTURE: CPT | Performed by: INTERNAL MEDICINE

## 2024-12-11 PROCEDURE — 80053 COMPREHEN METABOLIC PANEL: CPT | Performed by: STUDENT IN AN ORGANIZED HEALTH CARE EDUCATION/TRAINING PROGRAM

## 2024-12-11 PROCEDURE — 83735 ASSAY OF MAGNESIUM: CPT | Performed by: INTERNAL MEDICINE

## 2024-12-11 PROCEDURE — 36415 COLL VENOUS BLD VENIPUNCTURE: CPT | Performed by: STUDENT IN AN ORGANIZED HEALTH CARE EDUCATION/TRAINING PROGRAM

## 2024-12-11 PROCEDURE — 2500000004 HC RX 250 GENERAL PHARMACY W/ HCPCS (ALT 636 FOR OP/ED): Mod: JZ | Performed by: INTERNAL MEDICINE

## 2024-12-11 PROCEDURE — 2060000001 HC INTERMEDIATE ICU ROOM DAILY

## 2024-12-11 PROCEDURE — 2500000001 HC RX 250 WO HCPCS SELF ADMINISTERED DRUGS (ALT 637 FOR MEDICARE OP): Performed by: INTERNAL MEDICINE

## 2024-12-11 PROCEDURE — 99232 SBSQ HOSP IP/OBS MODERATE 35: CPT | Performed by: STUDENT IN AN ORGANIZED HEALTH CARE EDUCATION/TRAINING PROGRAM

## 2024-12-11 PROCEDURE — 2500000004 HC RX 250 GENERAL PHARMACY W/ HCPCS (ALT 636 FOR OP/ED): Performed by: INTERNAL MEDICINE

## 2024-12-11 PROCEDURE — 36558 INSERT TUNNELED CV CATH: CPT | Performed by: RADIOLOGY

## 2024-12-11 PROCEDURE — 8010000001 HC DIALYSIS - HEMODIALYSIS PER DAY

## 2024-12-11 PROCEDURE — 85025 COMPLETE CBC W/AUTO DIFF WBC: CPT | Performed by: INTERNAL MEDICINE

## 2024-12-11 PROCEDURE — 2500000002 HC RX 250 W HCPCS SELF ADMINISTERED DRUGS (ALT 637 FOR MEDICARE OP, ALT 636 FOR OP/ED): Performed by: INTERNAL MEDICINE

## 2024-12-11 PROCEDURE — 2500000001 HC RX 250 WO HCPCS SELF ADMINISTERED DRUGS (ALT 637 FOR MEDICARE OP): Performed by: NURSE PRACTITIONER

## 2024-12-11 PROCEDURE — 99232 SBSQ HOSP IP/OBS MODERATE 35: CPT | Performed by: PHYSICIAN ASSISTANT

## 2024-12-11 PROCEDURE — 77001 FLUOROGUIDE FOR VEIN DEVICE: CPT | Performed by: RADIOLOGY

## 2024-12-11 ASSESSMENT — COGNITIVE AND FUNCTIONAL STATUS - GENERAL
DAILY ACTIVITIY SCORE: 24
MOBILITY SCORE: 24
MOBILITY SCORE: 24
DAILY ACTIVITIY SCORE: 24

## 2024-12-11 ASSESSMENT — PAIN - FUNCTIONAL ASSESSMENT
PAIN_FUNCTIONAL_ASSESSMENT: 0-10

## 2024-12-11 ASSESSMENT — PAIN SCALES - WONG BAKER
WONGBAKER_NUMERICALRESPONSE: HURTS LITTLE BIT
WONGBAKER_NUMERICALRESPONSE: NO HURT
WONGBAKER_NUMERICALRESPONSE: HURTS LITTLE BIT

## 2024-12-11 ASSESSMENT — ENCOUNTER SYMPTOMS
FATIGUE: 0
DIFFICULTY URINATING: 0
FEVER: 0
ABDOMINAL PAIN: 0
SHORTNESS OF BREATH: 0

## 2024-12-11 ASSESSMENT — PAIN SCALES - GENERAL
PAINLEVEL_OUTOF10: 10 - WORST POSSIBLE PAIN
PAINLEVEL_OUTOF10: 3
PAINLEVEL_OUTOF10: 10 - WORST POSSIBLE PAIN
PAINLEVEL_OUTOF10: 10 - WORST POSSIBLE PAIN
PAINLEVEL_OUTOF10: 0 - NO PAIN
PAINLEVEL_OUTOF10: 6
PAINLEVEL_OUTOF10: 10 - WORST POSSIBLE PAIN

## 2024-12-11 NOTE — PROGRESS NOTES
Batsheva Page is a 49 y.o. female on day 8 of admission presenting with Sepsis (Multi).      Subjective   Seen on HD. Had leg TDC placed yesterday. Has been having myoclonic jerks. K this am 6.2. On Lyrica       Objective   Sleepy. Has unrelenting myclonic jerks.        Vitals 24HR  Heart Rate:  [62-78]   Temp:  [36 °C (96.8 °F)-37.2 °C (99 °F)]   Resp:  [12-26]   BP: ()/()   SpO2:  [90 %-93 %]       Intake/Output last 3 Shifts:    Intake/Output Summary (Last 24 hours) at 12/11/2024 1216  Last data filed at 12/11/2024 0906  Gross per 24 hour   Intake 100 ml   Output --   Net 100 ml       Physical Exam    Relevant Results               Assessment/Plan   ESRD on HD  HTNsive ESRD  CLABSI  Encephalopathy toxic - likely related to Lyrica and no HD for over a week. Her myoclonus are typically of gabapentin/pregaba toxicity    - Complete HD as ordered  - Will plan for another run of HD tomorrow  - D/w Dr Pulido          Assessment & Plan  Sepsis (Multi)                    Juliane Richardson MD

## 2024-12-11 NOTE — PROGRESS NOTES
Batsheva Page is a 49 y.o. female on day 8 of admission presenting with Sepsis (Multi).    Subjective   Interval History: no fever, no new complaints        Review of Systems   Constitutional:  Negative for fatigue and fever.   Respiratory:  Negative for shortness of breath.    Cardiovascular:  Negative for chest pain.   Gastrointestinal:  Negative for abdominal pain.   Genitourinary:  Negative for difficulty urinating.       Objective   Range of Vitals (last 24 hours)  Heart Rate:  [62-78]   Temp:  [36 °C (96.8 °F)-37.2 °C (99 °F)]   Resp:  [12-26]   BP: ()/()   SpO2:  [90 %-93 %]   Daily Weight  12/06/24 : 71.9 kg (158 lb 9.6 oz)    Body mass index is 25.61 kg/m².    Physical Exam  Constitutional:       Appearance: Normal appearance.   HENT:      Head: Normocephalic and atraumatic.      Mouth/Throat:      Mouth: Mucous membranes are moist.      Pharynx: Oropharynx is clear.   Eyes:      Pupils: Pupils are equal, round, and reactive to light.   Cardiovascular:      Rate and Rhythm: Normal rate and regular rhythm.      Heart sounds: Normal heart sounds.   Pulmonary:      Effort: Pulmonary effort is normal.      Breath sounds: Normal breath sounds.      Comments: Rt sided line was removed  Abdominal:      General: Abdomen is flat. Bowel sounds are normal.      Palpations: Abdomen is soft.   Musculoskeletal:      Cervical back: Normal range of motion.   Neurological:      Mental Status: She is alert.         Antibiotics  DAPTOmycin (Cubicin) IV in 50 mL NS    Relevant Results  Labs  Results from last 72 hours   Lab Units 12/11/24  0658 12/10/24  2120 12/10/24  0527 12/09/24  0550   WBC AUTO x10*3/uL 9.0 8.4 7.8 7.4   HEMOGLOBIN g/dL 11.4* 11.6* 11.9* 11.8*   HEMATOCRIT % 38.0 37.7 39.6 39.7   PLATELETS AUTO x10*3/uL 205 201 210 199   NEUTROS PCT AUTO % 82.0  --  72.4 71.1   LYMPHS PCT AUTO % 9.8  --  13.0 14.1   MONOS PCT AUTO % 4.8  --  5.2 5.7   EOS PCT AUTO % 2.3  --  7.9 8.0     Results from  last 72 hours   Lab Units 12/11/24  0544 12/10/24  0527 12/09/24  0550   SODIUM mmol/L 131* 135* 136   POTASSIUM mmol/L 6.2* 4.8 4.5   CHLORIDE mmol/L 93* 97* 97*   CO2 mmol/L 11* 15* 20*   BUN mg/dL 67* 55* 45*   CREATININE mg/dL 10.54* 9.33* 8.16*   GLUCOSE mg/dL 62* 78 79   CALCIUM mg/dL 7.3* 7.6* 7.9*   ANION GAP mmol/L 33* 28* 24*   EGFR mL/min/1.73m*2 4* 5* 6*   PHOSPHORUS mg/dL  --   --  5.6*     Results from last 72 hours   Lab Units 12/11/24  0544 12/10/24  0527 12/09/24  0550   ALK PHOS U/L 178* 154*  --    BILIRUBIN TOTAL mg/dL 0.5 0.5  --    PROTEIN TOTAL g/dL 7.6 7.9  --    ALT U/L 10 9  --    AST U/L 20 15  --    ALBUMIN g/dL 3.5 3.5 3.2*     Estimated Creatinine Clearance: 6.5 mL/min (A) (by C-G formula based on SCr of 10.54 mg/dL (H)).  C-Reactive Protein   Date Value Ref Range Status   12/03/2024 27.39 (H) <1.00 mg/dL Final     CRP   Date Value Ref Range Status   12/25/2022 9.90 (A) mg/dL Final     Comment:     REF VALUE  < 1.00     12/23/2022 23.46 (A) mg/dL Final     Comment:     REF VALUE  < 1.00       Microbiology  Susceptibility data from last 14 days.  Collected Specimen Info Organism Clindamycin Erythromycin Oxacillin Tetracycline Trimethoprim/Sulfamethoxazole Vancomycin   12/04/24 Blood culture from Dialysis Staphylococcus aureus         12/03/24 Swab from Anterior Nares Methicillin Susceptible Staphylococcus aureus (MSSA)         12/03/24 Blood culture from Dialysis Staphylococcus aureus         12/02/24 Blood culture from Peripheral Venipuncture Staphylococcus aureus         12/02/24 Blood culture from Peripheral Venipuncture Staphylococcus aureus         12/02/24 Blood culture from Peripheral Venipuncture Staphylococcus aureus         12/02/24 Blood culture from Peripheral Venipuncture Methicillin Resistant Staphylococcus aureus (MRSA)  S  S  R  S  S  S   Imaging  Reviewed       Assessment/Plan   49 y.o. female w/ PMH of ESRD on HD (M,W,F), multiple line associated infections and  endocarditis s/p bioprosthetic valve replacements presented with symptoms of sepsis likely 2/2 CLABSI. Blood cultures show gram positive cocci in clusters.    #Sepsis/bacteremia  #CLABSI  - She has AVR/MVR, MRSA +ve, LAURA w/ vegetation on the line s/p removal  - Continue Ceftaroline / Daptomycin  - Line placement done by IR  - Plan on Vancomycin with dialysis for 6 weeks from 12/5    Rachid Lee MD  Internal Medicine, PGY-2        Attending note : the patient was evaluated, the note was reviewed and updated  Sepsis / bacteremia, likely dialysis line related, she has AVR / MVR, MRSA, the last positive BC 12/4, TTE without vegetations, LAURA with vegetation on the line sp removal   Atelectasis  Recommendations :  Continue Daptomycin, plan on Vancomycin with dialysis for 6 weeks from 12/5  Apply pressure on the groin line site until the bleeding is resolved  Discussed with the medical team        I spent minutes in the professional and overall care of this patient.        Richard Gardiner MD

## 2024-12-11 NOTE — CARE PLAN
The patient's goals for the shift include  rest.    The clinical goals for the shift include Pt will remain HDS throughout shift.

## 2024-12-11 NOTE — POST-PROCEDURE NOTE
Report to Receiving RN:    Report To: Tamika RUTH   Time Report Called: 1300  Hand-Off Communication: stable 2.5 liters net removed no issues. Bp hard to get accurate reading due to patients severe body tremors.   Complications During Treatment: No  Ultrafiltration Treatment: No  Medications Administered During Dialysis: No  Blood Products Administered During Dialysis: No  Labs Sent During Dialysis: No  Heparin Drip Rate Changes: No  Dialysis Catheter Dressing: old drainage, clotted blood. Pressure dressing   Last Dressing Change: 12/10/24    Electronic Signatures:  Eliel Macario RN  (Signed )   Authored:    (Signed )   Authored:    Last Updated: 12:59 PM by ELIEL MACARIO

## 2024-12-11 NOTE — PROGRESS NOTES
Batsheva Page is a 49 y.o. female on day 8 of admission presenting with Sepsis (Multi).    Subjective   Per nursing, pt has been oozing blood from her groin cath that was placed since yesterday. They have been putting pressure on it and had changed dressing several times. Pt continues to have pain all over and not feel well.        Objective     Physical Exam  Vitals and nursing note reviewed.   Constitutional:       General: She is not in acute distress.     Appearance: Normal appearance. She is not ill-appearing or toxic-appearing.   HENT:      Head: Normocephalic and atraumatic.      Nose: Nose normal.      Mouth/Throat:      Mouth: Mucous membranes are moist.   Eyes:      Extraocular Movements: Extraocular movements intact.      Conjunctiva/sclera: Conjunctivae normal.      Pupils: Pupils are equal, round, and reactive to light.   Cardiovascular:      Rate and Rhythm: Normal rate and regular rhythm.      Heart sounds: Murmur heard.      No gallop.   Pulmonary:      Effort: Pulmonary effort is normal. No respiratory distress.      Breath sounds: Normal breath sounds. No wheezing, rhonchi or rales.   Abdominal:      General: Abdomen is flat. Bowel sounds are normal. There is no distension.      Palpations: Abdomen is soft. There is no mass.      Tenderness: There is no abdominal tenderness.   Musculoskeletal:         General: No swelling or tenderness. Normal range of motion.      Cervical back: Normal range of motion and neck supple.   Skin:     General: Skin is warm and dry.      Comments: Blood oozing at site of groin catheter   Neurological:      Mental Status: She is alert and oriented to person, place, and time.      Comments: Lots of jitter/tremor in all extremities   Psychiatric:         Mood and Affect: Mood normal.         Behavior: Behavior normal.         Thought Content: Thought content normal.         Judgment: Judgment normal.         Last Recorded Vitals:  BP 97/65   Pulse 78   Temp 36.7  "°C (98 °F)   Resp 20   Ht 1.676 m (5' 5.98\")   Wt 71.9 kg (158 lb 9.6 oz)   SpO2 90%   BMI 25.61 kg/m²      Scheduled medications:  acetaminophen, 975 mg, oral, q8h  aspirin, 81 mg, oral, Daily  atorvastatin, 40 mg, oral, Daily  calcitriol, 0.5 mcg, oral, Daily  ceftaroline, 200 mg, intravenous, q12h  daptomycin, 6 mg/kg (Adjusted), intravenous, q48h  diphenhydrAMINE, 12.5 mg, intravenous, Once  docusate sodium, 100 mg, oral, BID  ergocalciferol, 1,250 mcg, oral, Every Sunday  heparin, 1,900 Units, intra-catheter, After Dialysis  heparin, 1,900 Units, intra-catheter, After Dialysis  lactulose, 20 g, oral, TID  levETIRAcetam, 750 mg, oral, Nightly  metoprolol tartrate, 50 mg, oral, BID  ondansetron, 8 mg, intravenous, Once  pregabalin, 50 mg, oral, BID      Continuous medications:     PRN medications:  PRN medications: acetaminophen, dextromethorphan-guaifenesin, diphenhydrAMINE, diphenhydrAMINE, diphenhydrAMINE, guaiFENesin, HYDROmorphone, HYDROmorphone, HYDROmorphone, hydrOXYzine HCL, oxyCODONE **OR** oxyCODONE, promethazine, promethazine **OR** promethazine     Relevant Results:  Results for orders placed or performed during the hospital encounter of 12/03/24 (from the past 24 hours)   CBC   Result Value Ref Range    WBC 8.4 4.4 - 11.3 x10*3/uL    nRBC 0.0 0.0 - 0.0 /100 WBCs    RBC 4.18 4.00 - 5.20 x10*6/uL    Hemoglobin 11.6 (L) 12.0 - 16.0 g/dL    Hematocrit 37.7 36.0 - 46.0 %    MCV 90 80 - 100 fL    MCH 27.8 26.0 - 34.0 pg    MCHC 30.8 (L) 32.0 - 36.0 g/dL    RDW 16.5 (H) 11.5 - 14.5 %    Platelets 201 150 - 450 x10*3/uL   Protime-INR   Result Value Ref Range    Protime 15.6 (H) 9.8 - 12.8 seconds    INR 1.4 (H) 0.9 - 1.1   APTT   Result Value Ref Range    aPTT 39 (H) 27 - 38 seconds   Magnesium   Result Value Ref Range    Magnesium 2.29 1.60 - 2.40 mg/dL   Comprehensive Metabolic Panel   Result Value Ref Range    Glucose 62 (L) 74 - 99 mg/dL    Sodium 131 (L) 136 - 145 mmol/L    Potassium 6.2 (HH) 3.5 - " 5.3 mmol/L    Chloride 93 (L) 98 - 107 mmol/L    Bicarbonate 11 (L) 21 - 32 mmol/L    Anion Gap 33 (H) 10 - 20 mmol/L    Urea Nitrogen 67 (H) 6 - 23 mg/dL    Creatinine 10.54 (H) 0.50 - 1.05 mg/dL    eGFR 4 (L) >60 mL/min/1.73m*2    Calcium 7.3 (L) 8.6 - 10.3 mg/dL    Albumin 3.5 3.4 - 5.0 g/dL    Alkaline Phosphatase 178 (H) 33 - 110 U/L    Total Protein 7.6 6.4 - 8.2 g/dL    AST 20 9 - 39 U/L    Bilirubin, Total 0.5 0.0 - 1.2 mg/dL    ALT 10 7 - 45 U/L   CBC and Auto Differential   Result Value Ref Range    WBC 9.0 4.4 - 11.3 x10*3/uL    nRBC 0.0 0.0 - 0.0 /100 WBCs    RBC 4.14 4.00 - 5.20 x10*6/uL    Hemoglobin 11.4 (L) 12.0 - 16.0 g/dL    Hematocrit 38.0 36.0 - 46.0 %    MCV 92 80 - 100 fL    MCH 27.5 26.0 - 34.0 pg    MCHC 30.0 (L) 32.0 - 36.0 g/dL    RDW 16.7 (H) 11.5 - 14.5 %    Platelets 205 150 - 450 x10*3/uL    Neutrophils % 82.0 40.0 - 80.0 %    Immature Granulocytes %, Automated 0.7 0.0 - 0.9 %    Lymphocytes % 9.8 13.0 - 44.0 %    Monocytes % 4.8 2.0 - 10.0 %    Eosinophils % 2.3 0.0 - 6.0 %    Basophils % 0.4 0.0 - 2.0 %    Neutrophils Absolute 7.39 1.20 - 7.70 x10*3/uL    Immature Granulocytes Absolute, Automated 0.06 0.00 - 0.70 x10*3/uL    Lymphocytes Absolute 0.88 (L) 1.20 - 4.80 x10*3/uL    Monocytes Absolute 0.43 0.10 - 1.00 x10*3/uL    Eosinophils Absolute 0.21 0.00 - 0.70 x10*3/uL    Basophils Absolute 0.04 0.00 - 0.10 x10*3/uL       No results found.                   Assessment/Plan   Principal Problem:    Sepsis (Multi)    Batsheva Page is a 49 y.o. 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 Patient presented to the ED due to AMS fever (102oF) and rigor. Patient's  last HD session was 12/2/24 and the dialysis center enoch blood cultures was positive for gram +ve cocci in clusters  In the ED were remarkable mainly for a wbc of 17.7. CXR was read as showing a developing  right basal infiltrate.  She was given a dose each of vancomycin and zosyn and was transferred to [facility] for further care and management.      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  - IR placed groin dialysis cath on 12/10  - dialysis per nephro  - daptomycin  - will change to vanc with dialysis upon dc for 6 weeks from 12/5 with end date 1/16  - lyrica    Oozing from groin dialysis cath  - will try to get surg to possibly place a stitch, continue pressure dressing    ESRD on HD (MWF)  Cr is worsening  - nephro following  - dialysis when there is a new line  - calcitriol    Normocytic anemia  - monitor    RLL infiltrate on CXR, asymptomatic    HTN  - metoprolol     Seizure  - keppra    HLD  - statin   - ASA    Chronic pruritus   - benadryl     Dispo: monitor clinically, continue IV abx, get bleeding controlled, see if pt tolerates dialysis  Possible dc in 24-48 hours         Neha Pulido MD  Hospitalist

## 2024-12-11 NOTE — PRE-PROCEDURE NOTE
Could be from meds but usually see diarrhea few more days into treatment    Would cont ab's    Add probiotics tid and progress am   Report from Sending RN:    Report From: Tamika RUTH   Recent Surgery of Procedure: yes hd line placement right fem with bleeding at site. Pressure dressing was applied.   Baseline Level of Consciousness (LOC): x4  Oxygen Use: No  Type: na  Diabetic: No  Last BP Med Given Day of Dialysis: see emar   Last Pain Med Given: see emar   Lab Tests to be Obtained with Dialysis: No  Blood Transfusion to be Given During Dialysis: No  Available IV Access: Yes  Medications to be Administered During Dialysis: No  Continuous IV Infusion Running: No  Restraints on Currently or in the Last 24 Hours: No  Hand-Off Communication: stable for hd no issues  Dialysis Catheter Dressing: bleeding. Old clott formed under bandage. Dont change until later today/tomorrow to help site clot and stop bleeding   Last Dressing Change: 12/10/24

## 2024-12-11 NOTE — PROGRESS NOTES
"General/Trauma Surgery Daily Progress Note    Subjective   Had right femoral tunneled line placed by IR yesterday. Called to bedside for ongoing bleeding overnight at the insertion site. Patient received HD this morning without issue. She has missed several days of dialysis and is more somnolent and twitching compared to exam a few days prior. She does not interact much during exam today.        Objective   Last Recorded Vitals:  Blood pressure 137/79, pulse 70, temperature 36.3 °C (97.4 °F), temperature source Temporal, resp. rate 20, height 1.676 m (5' 5.98\"), weight 71.9 kg (158 lb 9.6 oz), SpO2 93%.    Intake/Output last 3 Shifts:  I/O last 3 completed shifts:  In: 50 (0.7 mL/kg) [IV Piggyback:50]  Out: - (0 mL/kg)   Weight: 71.9 kg     Pain Score:  0-10 (Numeric) Pain Score: 10 - Worst possible pain  Critical-Care Pain Observation Score:  [2]   Snyder-Baker FACES Pain Rating: Hurts little bit     Physical Exam:  Constitutional: No acute distress, whole body twitches throughout exam.  Neuro: Opens eyes to voice on occasional. Difficult to arouse. Localizes to pain.  Eyes:  No scleral icterus. Conjunctiva pink.  ENT: Dry, chapped lips with dark staining from purple pop drink.   Heart: Regular rate.  Respiratory: No increased work of breathing or audible wheeze.  Abdomen: Soft, nondistended.    MSK: MARCUM with twitching.   Vascular: Palpable pulses throughout. Right femoral tunneled HD catheter with saturated dressing of bright red blood and clot. Dressing removed, bright red blood with steady but weak ooze from HD insertion site. Ecchymosis noted to right groin. Insertion incision with glue intact.  Skin: Dry skin throughout. Poor turgor.  Psychological: Somnolent.          Relevant Results  Laboratory Results:  CBC:   Lab Results   Component Value Date    WBC 9.0 12/11/2024    RBC 4.14 12/11/2024    HGB 11.4 (L) 12/11/2024    HCT 38.0 12/11/2024     12/11/2024       RFP:   Lab Results   Component Value Date "     (L) 12/11/2024    K 6.2 (HH) 12/11/2024    CL 93 (L) 12/11/2024    CO2 11 (L) 12/11/2024    BUN 67 (H) 12/11/2024    CREATININE 10.54 (H) 12/11/2024    CALCIUM 7.3 (L) 12/11/2024    MG 2.29 12/11/2024    PHOS 5.6 (H) 12/09/2024        LFTs:   Lab Results   Component Value Date    PROT 7.6 12/11/2024    ALBUMIN 3.5 12/11/2024    BILITOT 0.5 12/11/2024    ALKPHOS 178 (H) 12/11/2024    AST 20 12/11/2024    ALT 10 12/11/2024         Imaging:  IR CVC tunneled    Result Date: 12/11/2024  Interpreted By:  Zachery Allen, STUDY: IR CVC TUNNELED;  12/11/2024 10:54 am   INDICATION: Signs/Symptoms:need dialysis cath.   COMPARISON: None.   ACCESSION NUMBER(S): [id number]   ORDERING CLINICIAN: GOPAL JAVIER   TECHNIQUE: TUNNELLED DIALYSIS CATHETER PLACEMENT WITH ULTRASOUND AND FLUOROSCOPIC GUIDANCE   The history and physical exam pertinent to the procedure were reviewed and no updates were made.   After discussing the procedure and possible complications with the patient, informed consent was obtained.   The patient was placed on the Special Procedures table.  The operative area was sterilely prepared using 2% chlorhexidine and then draped with sterile towels and a body-sized sterile drape.  All personnel in the Special Procedures Room donned caps and surgical masks.  In addition, the  and first assistant donned sterile gowns and gloves after proper hand cleansing.   A pre-procedure time out was performed in order to assure the correct patient and procedure.  Local anesthetic was administered.   The bilateral neck regions were evaluated for suitable vascular access as patient has had many prior catheters and known to have internal jugular vein thrombosis. Both internal jugular veins are thrombosed. A suitable left external jugular vein was not identified. The right superior external jugular vein is patent and dilated, however, there is apparent obstruction just above the clavicle. The right groin was then  evaluated showing a patent common femoral vein. The site was chosen for catheter placement. The right groin was prepped and draped in usual sterile fashion. The skin was anesthetized using lidocaine solution. Small skin nick was made. Micropuncture access was then gained into the right common femoral vein using live ultrasound guidance. Microwire was then advanced into the inferior vena cava under fluoroscopic guidance. A micro introducer was then advanced over the wire to secure positioning within the femoral vein. A skin tract was then anesthetized and formed from the inferolateral position of the access site. A dialysis catheter was then pulled through the tract and exited through the access site. A stiff Glidewire was then advanced through the micro introducer and taken into the inferior vena cava under fluoroscopic guidance. The tract was dilated to accommodate a 14 Turkmen peel-away sheath which was placed. The catheter was then advanced through the peel-away in the peel-away removed. Imaging documents the distal tips of the dialysis catheter to be within the IVC/right common iliac vein. Functionality of the catheter was confirmed and the ports were flushed with saline and loaded with heparin. The access site was secured with skin glue. A single suture was used to secure the external portion of the catheter to the thigh. Sterile dressings applied. No immediate complications encountered.   FINDINGS: As above.       1. Successful right femoral tunneled hemodialysis catheter placement. Catheter ready for immediate use. 2. No suitable access site identified within the neck as above.   MACRO: None.   Signed by: Zachery Allen 12/11/2024 12:01 PM Dictation workstation:   [id number]          Assessment/Plan   This is a 49 y.o. female with ESRD and right femoral tunneled HD line placed by IR yesterday. Received dialysis this morning without issue, but surgery called to bedside for active ooze from catheter site. I was  able to place a 3-0 prolene around the insertion site to offer more compression. I do suspect she will develop a hematoma in the area. I discussed with medicine attending.       Plan:   -- Can use sandbag to offer gentle pressure to groin to help prevent hematoma subQ  -- Prolene 3-0 secured at skin entry site to offer compression to control oozing  -- Hold NSAIDs, blood thinners   -- Keep line covered, at risk for pulling line in current state  -- Dialysis through line as needed      Discussed with Dr. Madrigal who is in agreement with plan. Please secure chat with questions.      Ivett Bland PA-C

## 2024-12-11 NOTE — CARE PLAN
The patient's goals for the shift include      The clinical goals for the shift include Dialysis catheter site will stop bleeding      Problem: Fall/Injury  Goal: Not fall by end of shift  Outcome: Progressing  Goal: Be free from injury by end of the shift  Outcome: Progressing  Goal: Verbalize understanding of personal risk factors for fall in the hospital  Outcome: Progressing  Goal: Verbalize understanding of risk factor reduction measures to prevent injury from fall in the home  Outcome: Progressing  Goal: Use assistive devices by end of the shift  Outcome: Progressing  Goal: Pace activities to prevent fatigue by end of the shift  Outcome: Progressing     Problem: Pain - Adult  Goal: Verbalizes/displays adequate comfort level or baseline comfort level  Outcome: Progressing     Problem: Safety - Adult  Goal: Free from fall injury  Outcome: Progressing     Problem: Discharge Planning  Goal: Discharge to home or other facility with appropriate resources  Outcome: Progressing     Problem: Chronic Conditions and Co-morbidities  Goal: Patient's chronic conditions and co-morbidity symptoms are monitored and maintained or improved  Outcome: Progressing     Problem: Infection related to problem list condition  Goal: Infection will resolve through treatment  Outcome: Progressing     Problem: Pain  Goal: Takes deep breaths with improved pain control throughout the shift  Outcome: Progressing  Goal: Turns in bed with improved pain control throughout the shift  Outcome: Progressing  Goal: Walks with improved pain control throughout the shift  Outcome: Progressing  Goal: Performs ADL's with improved pain control throughout shift  Outcome: Progressing  Goal: Participates in PT with improved pain control throughout the shift  Outcome: Progressing  Goal: Free from opioid side effects throughout the shift  Outcome: Progressing  Goal: Free from acute confusion related to pain meds throughout the shift  Outcome: Progressing      Problem: Skin  Goal: Decreased wound size/increased tissue granulation at next dressing change  Outcome: Progressing  Flowsheets (Taken 12/11/2024 1040)  Decreased wound size/increased tissue granulation at next dressing change: Promote sleep for wound healing  Goal: Participates in plan/prevention/treatment measures  Outcome: Progressing  Flowsheets (Taken 12/11/2024 1040)  Participates in plan/prevention/treatment measures:   Discuss with provider PT/OT consult   Elevate heels   Increase activity/out of bed for meals  Goal: Prevent/manage excess moisture  Outcome: Progressing  Flowsheets (Taken 12/11/2024 1040)  Prevent/manage excess moisture: Monitor for/manage infection if present  Goal: Prevent/minimize sheer/friction injuries  Outcome: Progressing  Flowsheets (Taken 12/11/2024 1040)  Prevent/minimize sheer/friction injuries:   Complete micro-shifts as needed if patient unable. Adjust patient position to relieve pressure points, not a full turn   Increase activity/out of bed for meals   Use pull sheet   HOB 30 degrees or less  Goal: Promote/optimize nutrition  Outcome: Progressing  Flowsheets (Taken 12/11/2024 1040)  Promote/optimize nutrition:   Monitor/record intake including meals   Offer water/supplements/favorite foods  Goal: Promote skin healing  Outcome: Progressing  Flowsheets (Taken 12/10/2024 2304 by Sabrina Gardner RN)  Promote skin healing: Assess skin/pad under line(s)/device(s)

## 2024-12-11 NOTE — PROGRESS NOTES
12/11/24 0849   Discharge Planning   Living Arrangements Spouse/significant other   Support Systems Spouse/significant other;Children;Family members   Assistance Needed Patient is A&OX3, independent in ADLs, ambulates without assistive devices unless a lot of walking will use a cane, does not drive but family able to transport, goes to dialysis M, W, F at 0500 at [facility], no active HC agency, No O2, CPAP or Bipap at baseline-currently room air   Type of Residence Private residence   Number of Stairs to Enter Residence 4   Number of Stairs Within Residence 0   Do you have animals or pets at home? No   Who is requesting discharge planning? Provider   Home or Post Acute Services None   Expected Discharge Disposition Home  (denies any C needs. Dialysis line has been removed, currently on line holiday, needs dialysis line replaced prior to d/c)   Does the patient need discharge transport arranged? Yes   RoundTrip coordination needed? Yes   Has discharge transport been arranged? No

## 2024-12-12 ENCOUNTER — TELEPHONE (OUTPATIENT)
Dept: INTERNAL MEDICINE | Facility: HOSPITAL | Age: 49
End: 2024-12-12

## 2024-12-12 ENCOUNTER — APPOINTMENT (OUTPATIENT)
Dept: DIALYSIS | Facility: HOSPITAL | Age: 49
End: 2024-12-12
Payer: MEDICARE

## 2024-12-12 VITALS
TEMPERATURE: 97.7 F | WEIGHT: 158.6 LBS | OXYGEN SATURATION: 94 % | BODY MASS INDEX: 25.49 KG/M2 | HEART RATE: 68 BPM | HEIGHT: 66 IN | RESPIRATION RATE: 16 BRPM | DIASTOLIC BLOOD PRESSURE: 85 MMHG | SYSTOLIC BLOOD PRESSURE: 149 MMHG

## 2024-12-12 LAB
ALBUMIN SERPL BCP-MCNC: 3.5 G/DL (ref 3.4–5)
ALP SERPL-CCNC: 217 U/L (ref 33–110)
ALT SERPL W P-5'-P-CCNC: 9 U/L (ref 7–45)
ANION GAP SERPL CALC-SCNC: 25 MMOL/L (ref 10–20)
AST SERPL W P-5'-P-CCNC: 15 U/L (ref 9–39)
BACTERIA BLD CULT: NORMAL
BACTERIA BLD CULT: NORMAL
BASOPHILS # BLD AUTO: 0.03 X10*3/UL (ref 0–0.1)
BASOPHILS NFR BLD AUTO: 0.5 %
BILIRUB SERPL-MCNC: 0.6 MG/DL (ref 0–1.2)
BUN SERPL-MCNC: 33 MG/DL (ref 6–23)
CALCIUM SERPL-MCNC: 7.8 MG/DL (ref 8.6–10.3)
CHLORIDE SERPL-SCNC: 92 MMOL/L (ref 98–107)
CO2 SERPL-SCNC: 21 MMOL/L (ref 21–32)
CREAT SERPL-MCNC: 6.28 MG/DL (ref 0.5–1.05)
EGFRCR SERPLBLD CKD-EPI 2021: 8 ML/MIN/1.73M*2
EOSINOPHIL # BLD AUTO: 0.16 X10*3/UL (ref 0–0.7)
EOSINOPHIL NFR BLD AUTO: 2.4 %
ERYTHROCYTE [DISTWIDTH] IN BLOOD BY AUTOMATED COUNT: 16.7 % (ref 11.5–14.5)
GLUCOSE SERPL-MCNC: 70 MG/DL (ref 74–99)
HCT VFR BLD AUTO: 38.1 % (ref 36–46)
HGB BLD-MCNC: 11.7 G/DL (ref 12–16)
IMM GRANULOCYTES # BLD AUTO: 0.03 X10*3/UL (ref 0–0.7)
IMM GRANULOCYTES NFR BLD AUTO: 0.5 % (ref 0–0.9)
LYMPHOCYTES # BLD AUTO: 0.82 X10*3/UL (ref 1.2–4.8)
LYMPHOCYTES NFR BLD AUTO: 12.4 %
MAGNESIUM SERPL-MCNC: 1.99 MG/DL (ref 1.6–2.4)
MCH RBC QN AUTO: 27.6 PG (ref 26–34)
MCHC RBC AUTO-ENTMCNC: 30.7 G/DL (ref 32–36)
MCV RBC AUTO: 90 FL (ref 80–100)
MONOCYTES # BLD AUTO: 0.46 X10*3/UL (ref 0.1–1)
MONOCYTES NFR BLD AUTO: 7 %
NEUTROPHILS # BLD AUTO: 5.1 X10*3/UL (ref 1.2–7.7)
NEUTROPHILS NFR BLD AUTO: 77.2 %
NRBC BLD-RTO: 0 /100 WBCS (ref 0–0)
PLATELET # BLD AUTO: 198 X10*3/UL (ref 150–450)
POTASSIUM SERPL-SCNC: 4.7 MMOL/L (ref 3.5–5.3)
PROT SERPL-MCNC: 8 G/DL (ref 6.4–8.2)
RBC # BLD AUTO: 4.24 X10*6/UL (ref 4–5.2)
SODIUM SERPL-SCNC: 133 MMOL/L (ref 136–145)
WBC # BLD AUTO: 6.6 X10*3/UL (ref 4.4–11.3)

## 2024-12-12 PROCEDURE — 80053 COMPREHEN METABOLIC PANEL: CPT | Performed by: STUDENT IN AN ORGANIZED HEALTH CARE EDUCATION/TRAINING PROGRAM

## 2024-12-12 PROCEDURE — 85025 COMPLETE CBC W/AUTO DIFF WBC: CPT | Performed by: INTERNAL MEDICINE

## 2024-12-12 PROCEDURE — 2500000004 HC RX 250 GENERAL PHARMACY W/ HCPCS (ALT 636 FOR OP/ED): Performed by: INTERNAL MEDICINE

## 2024-12-12 PROCEDURE — 2500000001 HC RX 250 WO HCPCS SELF ADMINISTERED DRUGS (ALT 637 FOR MEDICARE OP): Performed by: INTERNAL MEDICINE

## 2024-12-12 PROCEDURE — 36415 COLL VENOUS BLD VENIPUNCTURE: CPT | Performed by: INTERNAL MEDICINE

## 2024-12-12 PROCEDURE — 99239 HOSP IP/OBS DSCHRG MGMT >30: CPT | Performed by: STUDENT IN AN ORGANIZED HEALTH CARE EDUCATION/TRAINING PROGRAM

## 2024-12-12 PROCEDURE — 8010000001 HC DIALYSIS - HEMODIALYSIS PER DAY

## 2024-12-12 PROCEDURE — 83735 ASSAY OF MAGNESIUM: CPT | Performed by: INTERNAL MEDICINE

## 2024-12-12 PROCEDURE — 2500000001 HC RX 250 WO HCPCS SELF ADMINISTERED DRUGS (ALT 637 FOR MEDICARE OP): Performed by: NURSE PRACTITIONER

## 2024-12-12 ASSESSMENT — ENCOUNTER SYMPTOMS
ABDOMINAL PAIN: 0
SHORTNESS OF BREATH: 0
FATIGUE: 0
DIFFICULTY URINATING: 0
FEVER: 0

## 2024-12-12 ASSESSMENT — COGNITIVE AND FUNCTIONAL STATUS - GENERAL
CLIMB 3 TO 5 STEPS WITH RAILING: A LITTLE
DAILY ACTIVITIY SCORE: 23
TOILETING: A LITTLE
MOBILITY SCORE: 20
MOVING TO AND FROM BED TO CHAIR: A LITTLE
WALKING IN HOSPITAL ROOM: A LITTLE
STANDING UP FROM CHAIR USING ARMS: A LITTLE

## 2024-12-12 ASSESSMENT — PAIN SCALES - GENERAL
PAINLEVEL_OUTOF10: 10 - WORST POSSIBLE PAIN
PAINLEVEL_OUTOF10: 4
PAINLEVEL_OUTOF10: 2
PAINLEVEL_OUTOF10: 10 - WORST POSSIBLE PAIN
PAINLEVEL_OUTOF10: 9
PAINLEVEL_OUTOF10: 10 - WORST POSSIBLE PAIN

## 2024-12-12 ASSESSMENT — PAIN - FUNCTIONAL ASSESSMENT
PAIN_FUNCTIONAL_ASSESSMENT: 0-10

## 2024-12-12 ASSESSMENT — PAIN SCALES - WONG BAKER: WONGBAKER_NUMERICALRESPONSE: HURTS LITTLE BIT

## 2024-12-12 NOTE — PROGRESS NOTES
Batsheva Page is a 49 y.o. female on day 9 of admission presenting with Sepsis (Multi).    Subjective   Interval History: no fever, no new complaints        Review of Systems   Constitutional:  Negative for fatigue and fever.   Respiratory:  Negative for shortness of breath.    Cardiovascular:  Negative for chest pain.   Gastrointestinal:  Negative for abdominal pain.   Genitourinary:  Negative for difficulty urinating.       Objective   Range of Vitals (last 24 hours)  Heart Rate:  [68-76]   Temp:  [35.8 °C (96.4 °F)-36.6 °C (97.9 °F)]   Resp:  [16-20]   BP: (137-170)/(71-85)   SpO2:  [93 %-96 %]   Daily Weight  12/06/24 : 71.9 kg (158 lb 9.6 oz)    Body mass index is 25.61 kg/m².    Physical Exam  Constitutional:       Appearance: Normal appearance.   HENT:      Head: Normocephalic and atraumatic.      Mouth/Throat:      Mouth: Mucous membranes are moist.      Pharynx: Oropharynx is clear.   Eyes:      Pupils: Pupils are equal, round, and reactive to light.   Cardiovascular:      Rate and Rhythm: Normal rate and regular rhythm.      Heart sounds: Normal heart sounds.   Pulmonary:      Effort: Pulmonary effort is normal.      Breath sounds: Normal breath sounds.      Comments: Rt sided line was removed  Abdominal:      General: Abdomen is flat. Bowel sounds are normal.      Palpations: Abdomen is soft.   Musculoskeletal:      Cervical back: Normal range of motion.   Neurological:      Mental Status: She is alert.         Antibiotics  DAPTOmycin (Cubicin) IV in 50 mL NS    Relevant Results  Labs  Results from last 72 hours   Lab Units 12/12/24  0551 12/11/24  0658 12/10/24  2120 12/10/24  0527   WBC AUTO x10*3/uL 6.6 9.0 8.4 7.8   HEMOGLOBIN g/dL 11.7* 11.4* 11.6* 11.9*   HEMATOCRIT % 38.1 38.0 37.7 39.6   PLATELETS AUTO x10*3/uL 198 205 201 210   NEUTROS PCT AUTO % 77.2 82.0  --  72.4   LYMPHS PCT AUTO % 12.4 9.8  --  13.0   MONOS PCT AUTO % 7.0 4.8  --  5.2   EOS PCT AUTO % 2.4 2.3  --  7.9     Results from  last 72 hours   Lab Units 12/12/24  0551 12/11/24  0544 12/10/24  0527   SODIUM mmol/L 133* 131* 135*   POTASSIUM mmol/L 4.7 6.2* 4.8   CHLORIDE mmol/L 92* 93* 97*   CO2 mmol/L 21 11* 15*   BUN mg/dL 33* 67* 55*   CREATININE mg/dL 6.28* 10.54* 9.33*   GLUCOSE mg/dL 70* 62* 78   CALCIUM mg/dL 7.8* 7.3* 7.6*   ANION GAP mmol/L 25* 33* 28*   EGFR mL/min/1.73m*2 8* 4* 5*     Results from last 72 hours   Lab Units 12/12/24  0551 12/11/24  0544 12/10/24  0527   ALK PHOS U/L 217* 178* 154*   BILIRUBIN TOTAL mg/dL 0.6 0.5 0.5   PROTEIN TOTAL g/dL 8.0 7.6 7.9   ALT U/L 9 10 9   AST U/L 15 20 15   ALBUMIN g/dL 3.5 3.5 3.5     Estimated Creatinine Clearance: 10.9 mL/min (A) (by C-G formula based on SCr of 6.28 mg/dL (H)).  C-Reactive Protein   Date Value Ref Range Status   12/03/2024 27.39 (H) <1.00 mg/dL Final     CRP   Date Value Ref Range Status   12/25/2022 9.90 (A) mg/dL Final     Comment:     REF VALUE  < 1.00     12/23/2022 23.46 (A) mg/dL Final     Comment:     REF VALUE  < 1.00       Microbiology  Susceptibility data from last 14 days.  Collected Specimen Info Organism Clindamycin Erythromycin Oxacillin Tetracycline Trimethoprim/Sulfamethoxazole Vancomycin   12/04/24 Blood culture from Dialysis Staphylococcus aureus         12/03/24 Swab from Anterior Nares Methicillin Susceptible Staphylococcus aureus (MSSA)         12/03/24 Blood culture from Dialysis Staphylococcus aureus         12/02/24 Blood culture from Peripheral Venipuncture Staphylococcus aureus         12/02/24 Blood culture from Peripheral Venipuncture Staphylococcus aureus         12/02/24 Blood culture from Peripheral Venipuncture Staphylococcus aureus         12/02/24 Blood culture from Peripheral Venipuncture Methicillin Resistant Staphylococcus aureus (MRSA)  S  S  R  S  S  S   Imaging  Reviewed       Assessment/Plan   49 y.o. female w/ PMH of ESRD on HD (M,W,F), multiple line associated infections and endocarditis s/p bioprosthetic valve replacements  presented with symptoms of sepsis likely 2/2 CLABSI. Blood cultures show gram positive cocci in clusters.    #Sepsis/bacteremia  #CLABSI  - She has AVR/MVR, MRSA +ve, LAURA w/ vegetation on the line s/p removal  - Continue Daptomycin  - Plan on Vancomycin with dialysis for 6 weeks from 12/5  - Communicate with nephrology regarding appropriate antibiotic use during dialysis    Rachid Lee MD  Internal Medicine, PGY-2          Attending note : the patient was evaluated, the note was reviewed and updated  Sepsis / bacteremia, likely dialysis line related, she has AVR / MVR, MRSA, the last positive BC 12/4, TTE without vegetations, LAURA with vegetation on the line sp removal   Atelectasis  Recommendations :  Plan on Vancomycin with dialysis for 6 weeks from 12/5  Discussed with the medical team        I spent minutes in the professional and overall care of this patient.        Richard Gardiner MD

## 2024-12-12 NOTE — PRE-PROCEDURE NOTE
Report from Sending RN:    Report From: Ying Berman  Recent Surgery of Procedure: No  Baseline Level of Consciousness (LOC): A&O X's 3  Oxygen Use: No  Type: room air  Diabetic: No  Last BP Med Given Day of Dialysis: metoprolol  Last Pain Med Given: see EMAR  Lab Tests to be Obtained with Dialysis: No  Blood Transfusion to be Given During Dialysis: No  Available IV Access: Yes  Medications to be Administered During Dialysis: No  Continuous IV Infusion Running: No  Restraints on Currently or in the Last 24 Hours: No  Hand-Off Communication: she is just going to get her metoprolol, BP usually runs high during dialysis. myoclonic jerks that are improving from gabapentin/lyrica toxicity. 20 left AC. room air   Dialysis Catheter Dressing: Will assess upon arrival  Last Dressing Change: Will assess upon arrival

## 2024-12-12 NOTE — DISCHARGE SUMMARY
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   aspirin 81 mg EC tablet; Take 1 tablet (81 mg) by mouth once daily.   atorvastatin 40 mg tablet; Commonly known as: Lipitor   calcitriol 0.25 mcg capsule; Commonly known as: Rocaltrol   cloNIDine 0.1 mg tablet; Commonly known as: Catapres; Take 1 tablet (0.1   mg) by mouth every 8 hours.   epoetin brandy-epbx 20,000 unit/mL solution; Commonly known as: Retacrit;   Inject 6,440 Units under the skin 3 times a week. Do not start before   January 17, 2024.   ergocalciferol 1.25 MG (81498 UT) capsule; Commonly known as: Vitamin   D-2   levETIRAcetam 500 mg tablet; Commonly known as: Keppra   metoprolol tartrate 25 mg tablet; Commonly known as: Lopressor; Take 1   tablet (25 mg) by mouth 2 times a day.   oxyCODONE-acetaminophen 5-325 mg tablet; Commonly known as: Percocet;   Take 1 tablet by mouth every 6 hours as needed for moderate pain (4 - 6).   pregabalin 25 mg capsule; Commonly known as: Lyrica; Take 2 capsules (50   mg) by mouth 2 times a day.   promethazine 25 mg tablet; Commonly known as: Phenergan     STOP taking these medications     hydrALAZINE 50 mg tablet; Commonly known as: Apresoline   mirtazapine 15 mg tablet; Commonly known as: Remeron       Test Results Pending At Discharge  Pending Labs       Order Current Status    Blood Culture Preliminary result    Blood Culture Preliminary result            Hospital Course   Batsheva PABLO Depparishwiadriel is a 49 y.o. 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 Patient  presented to the ED due to AMS fever (102oF) and rigor. Patient's  last HD session was 12/2/24 and the dialysis center [facility] blood cultures was positive for gram +ve cocci in clusters  In the ED were remarkable mainly for a wbc of 17.7. CXR was read as showing a developing right basal infiltrate.  She was given a dose each of vancomycin and zosyn and was transferred to [facility] for further care and management.      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  - IR placed groin dialysis cath on 12/10  - dialysis per nephro  - daptomycin  - will change to vanc with dialysis upon dc for 6 weeks from 12/5 with end date 1/16  - lyrica     Oozing from groin dialysis cath  Sp stitch from surg  - now oozing has resolved    ESRD on HD (MWF)  - nephro following  - dialysis per nephro  - calcitriol     Normocytic anemia  - monitor     RLL infiltrate on CXR, asymptomatic     HTN  - metoprolol     Seizure  - keppra     HLD  - statin   - ASA     Chronic pruritus   - benadryl    Pt is hemodynamically stable for discharge at this time with close outpatient follow ups.     The patient was discharged in satisfactory condition.   Medications and side effect profile reviewed with patient.  More than 30 minutes were spent in coordinating patient discharge     Pertinent Physical Exam At Time of Discharge  Physical Exam  Vitals and nursing note reviewed.   Constitutional:       General: She is not in acute distress.     Appearance: Normal appearance. She is not ill-appearing or  toxic-appearing.   HENT:      Head: Normocephalic and atraumatic.      Nose: Nose normal.      Mouth/Throat:      Mouth: Mucous membranes are moist.   Eyes:      Extraocular Movements: Extraocular movements intact.      Conjunctiva/sclera: Conjunctivae normal.      Pupils: Pupils are equal, round, and reactive to light.   Cardiovascular:      Rate and Rhythm: Normal rate and regular rhythm.      Heart sounds: Murmur heard.      No gallop.   Pulmonary:      Effort: Pulmonary effort is normal. No respiratory distress.      Breath sounds: Normal breath sounds. No wheezing, rhonchi or rales.   Abdominal:      General: Abdomen is flat. Bowel sounds are normal. There is no distension.      Palpations: Abdomen is soft. There is no mass.      Tenderness: There is no abdominal tenderness.   Musculoskeletal:         General: No swelling or tenderness. Normal range of motion.      Cervical back: Normal range of motion and neck supple.   Skin:     General: Skin is warm and dry.      Neurological:      Mental Status: She is alert and oriented to person, place, and time.   Psychiatric:         Mood and Affect: Mood normal.         Behavior: Behavior normal.         Thought Content: Thought content normal.         Judgment: Judgment normal.     Outpatient Follow-Up  No future appointments.      Neha Pulido MD  Hospitalist

## 2024-12-12 NOTE — CARE PLAN
Pt discharged today.  Faxed DC Summary, dialysis info, ID note, and AVS to [facility]. Called to [facility] to verify they are aware of order for vanco with dialysis.  They have the order.

## 2024-12-12 NOTE — POST-PROCEDURE NOTE
Report to Receiving RN:    Report To: Ying Magdalena  Time Report Called: [id number]  Hand-Off Communication: Srinivas Page is returning to you after 1 hour and 40 minutes of treatment. She said she was uncomfortable and that her leg where new catheter was placed was sore. Called Dr Richardson and he agreed to take her of. She had been resting comfortably. No tremors noted. She removed 500 ml and her last BP was 119/741   Complications During Treatment: No  Ultrafiltration Treatment: No  Medications Administered During Dialysis: No  Blood Products Administered During Dialysis: No  Labs Sent During Dialysis: No  Heparin Drip Rate Changes: No  Dialysis Catheter Dressing: Dry and intact  Last Dressing Change: 12/10/2024    Electronic Signatures:  Xochitl Marley RN       Last Updated: 11:28 AM by XOCHITL MARLEY

## 2024-12-13 ENCOUNTER — PATIENT OUTREACH (OUTPATIENT)
Dept: CARE COORDINATION | Facility: CLINIC | Age: 49
End: 2024-12-13
Payer: MEDICARE

## 2024-12-13 NOTE — PROGRESS NOTES
[facility]  Admitted 12/3/2024  Discharged 12/12/2024  Dx: Sepsis, Recurrent MRSA bacteremia 2/2 infected dialysis line, metabolic encephalopathy , ESRD-HD-MWF    12/10/2024 Dialysis catheter placed in the groin    Vancomycin with dialysis days 12/5/2024 - 1/16/2024    Outreach call to patient to support a smooth transition of care from recent admission. No answer and the voicemail box is full. Will continue to monitor through transition period.    Demetria Martinez RN/CM   [facility]  [phone]

## 2024-12-14 ENCOUNTER — APPOINTMENT (OUTPATIENT)
Dept: RADIOLOGY | Facility: HOSPITAL | Age: 49
DRG: 314 | End: 2024-12-14
Payer: MEDICARE

## 2024-12-14 ENCOUNTER — HOSPITAL ENCOUNTER (OUTPATIENT)
Dept: CARDIOLOGY | Facility: HOSPITAL | Age: 49
Discharge: HOME | DRG: 314 | End: 2024-12-14
Payer: MEDICARE

## 2024-12-14 ENCOUNTER — HOSPITAL ENCOUNTER (INPATIENT)
Facility: HOSPITAL | Age: 49
DRG: 314 | End: 2024-12-14
Attending: EMERGENCY MEDICINE | Admitting: SURGERY
Payer: MEDICARE

## 2024-12-14 ENCOUNTER — APPOINTMENT (OUTPATIENT)
Dept: CARDIOLOGY | Facility: HOSPITAL | Age: 49
DRG: 314 | End: 2024-12-14
Payer: MEDICARE

## 2024-12-14 DIAGNOSIS — I48.0 PAROXYSMAL ATRIAL FIBRILLATION (MULTI): ICD-10-CM

## 2024-12-14 DIAGNOSIS — F41.9 ANXIETY: ICD-10-CM

## 2024-12-14 DIAGNOSIS — N18.6 ESRD (END STAGE RENAL DISEASE) (MULTI): ICD-10-CM

## 2024-12-14 DIAGNOSIS — A41.9 SEPTIC SHOCK (MULTI): Primary | ICD-10-CM

## 2024-12-14 DIAGNOSIS — R65.21 SEPTIC SHOCK (MULTI): Primary | ICD-10-CM

## 2024-12-14 LAB
ALBUMIN SERPL BCP-MCNC: 3.1 G/DL (ref 3.4–5)
ALBUMIN SERPL BCP-MCNC: 3.5 G/DL (ref 3.4–5)
ALP SERPL-CCNC: 184 U/L (ref 33–110)
ALP SERPL-CCNC: 233 U/L (ref 33–110)
ALT SERPL W P-5'-P-CCNC: 10 U/L (ref 7–45)
ALT SERPL W P-5'-P-CCNC: 7 U/L (ref 7–45)
ANION GAP SERPL CALCULATED.3IONS-SCNC: 18 MMOL/L (ref 10–20)
ANION GAP SERPL CALCULATED.3IONS-SCNC: 24 MMOL/L (ref 10–20)
AST SERPL W P-5'-P-CCNC: 14 U/L (ref 9–39)
AST SERPL W P-5'-P-CCNC: 19 U/L (ref 9–39)
BASOPHILS # BLD AUTO: 0.08 X10*3/UL (ref 0–0.1)
BASOPHILS NFR BLD AUTO: 0.4 %
BILIRUB SERPL-MCNC: 0.5 MG/DL (ref 0–1.2)
BILIRUB SERPL-MCNC: 0.6 MG/DL (ref 0–1.2)
BUN SERPL-MCNC: 41 MG/DL (ref 6–23)
BUN SERPL-MCNC: 45 MG/DL (ref 6–23)
CALCIUM SERPL-MCNC: 6.8 MG/DL (ref 8.6–10.3)
CALCIUM SERPL-MCNC: 7.9 MG/DL (ref 8.6–10.3)
CHLORIDE SERPL-SCNC: 91 MMOL/L (ref 98–107)
CHLORIDE SERPL-SCNC: 98 MMOL/L (ref 98–107)
CO2 SERPL-SCNC: 21 MMOL/L (ref 21–32)
CO2 SERPL-SCNC: 22 MMOL/L (ref 21–32)
CREAT SERPL-MCNC: 5.92 MG/DL (ref 0.5–1.05)
CREAT SERPL-MCNC: 6.59 MG/DL (ref 0.5–1.05)
EGFRCR SERPLBLD CKD-EPI 2021: 7 ML/MIN/1.73M*2
EGFRCR SERPLBLD CKD-EPI 2021: 8 ML/MIN/1.73M*2
EOSINOPHIL # BLD AUTO: 0.05 X10*3/UL (ref 0–0.7)
EOSINOPHIL NFR BLD AUTO: 0.2 %
ERYTHROCYTE [DISTWIDTH] IN BLOOD BY AUTOMATED COUNT: 16.7 % (ref 11.5–14.5)
ERYTHROCYTE [DISTWIDTH] IN BLOOD BY AUTOMATED COUNT: 16.9 % (ref 11.5–14.5)
GLUCOSE SERPL-MCNC: 116 MG/DL (ref 74–99)
GLUCOSE SERPL-MCNC: 132 MG/DL (ref 74–99)
HCT VFR BLD AUTO: 32.6 % (ref 36–46)
HCT VFR BLD AUTO: 35.3 % (ref 36–46)
HGB BLD-MCNC: 10.9 G/DL (ref 12–16)
HGB BLD-MCNC: 9.9 G/DL (ref 12–16)
IMM GRANULOCYTES # BLD AUTO: 0.2 X10*3/UL (ref 0–0.7)
IMM GRANULOCYTES NFR BLD AUTO: 0.9 % (ref 0–0.9)
LACTATE SERPL-SCNC: 1.2 MMOL/L (ref 0.4–2)
LACTATE SERPL-SCNC: 2.5 MMOL/L (ref 0.4–2)
LYMPHOCYTES # BLD AUTO: 0.34 X10*3/UL (ref 1.2–4.8)
LYMPHOCYTES NFR BLD AUTO: 1.5 %
MAGNESIUM SERPL-MCNC: 1.91 MG/DL (ref 1.6–2.4)
MCH RBC QN AUTO: 28.3 PG (ref 26–34)
MCH RBC QN AUTO: 28.5 PG (ref 26–34)
MCHC RBC AUTO-ENTMCNC: 30.4 G/DL (ref 32–36)
MCHC RBC AUTO-ENTMCNC: 30.9 G/DL (ref 32–36)
MCV RBC AUTO: 92 FL (ref 80–100)
MCV RBC AUTO: 93 FL (ref 80–100)
MONOCYTES # BLD AUTO: 0.44 X10*3/UL (ref 0.1–1)
MONOCYTES NFR BLD AUTO: 2 %
NEUTROPHILS # BLD AUTO: 21.32 X10*3/UL (ref 1.2–7.7)
NEUTROPHILS NFR BLD AUTO: 95 %
NRBC BLD-RTO: 0 /100 WBCS (ref 0–0)
NRBC BLD-RTO: 0 /100 WBCS (ref 0–0)
PHOSPHATE SERPL-MCNC: 4.2 MG/DL (ref 2.5–4.9)
PLATELET # BLD AUTO: 157 X10*3/UL (ref 150–450)
PLATELET # BLD AUTO: 201 X10*3/UL (ref 150–450)
POTASSIUM SERPL-SCNC: 4.2 MMOL/L (ref 3.5–5.3)
POTASSIUM SERPL-SCNC: 4.5 MMOL/L (ref 3.5–5.3)
PROT SERPL-MCNC: 6.6 G/DL (ref 6.4–8.2)
PROT SERPL-MCNC: 8.4 G/DL (ref 6.4–8.2)
RBC # BLD AUTO: 3.5 X10*6/UL (ref 4–5.2)
RBC # BLD AUTO: 3.82 X10*6/UL (ref 4–5.2)
SODIUM SERPL-SCNC: 132 MMOL/L (ref 136–145)
SODIUM SERPL-SCNC: 133 MMOL/L (ref 136–145)
WBC # BLD AUTO: 16.4 X10*3/UL (ref 4.4–11.3)
WBC # BLD AUTO: 22.4 X10*3/UL (ref 4.4–11.3)

## 2024-12-14 PROCEDURE — 2500000004 HC RX 250 GENERAL PHARMACY W/ HCPCS (ALT 636 FOR OP/ED)

## 2024-12-14 PROCEDURE — 2500000001 HC RX 250 WO HCPCS SELF ADMINISTERED DRUGS (ALT 637 FOR MEDICARE OP)

## 2024-12-14 PROCEDURE — 93010 ELECTROCARDIOGRAM REPORT: CPT | Performed by: INTERNAL MEDICINE

## 2024-12-14 PROCEDURE — 70450 CT HEAD/BRAIN W/O DYE: CPT

## 2024-12-14 PROCEDURE — 36415 COLL VENOUS BLD VENIPUNCTURE: CPT | Performed by: EMERGENCY MEDICINE

## 2024-12-14 PROCEDURE — 83605 ASSAY OF LACTIC ACID: CPT | Performed by: EMERGENCY MEDICINE

## 2024-12-14 PROCEDURE — 85027 COMPLETE CBC AUTOMATED: CPT

## 2024-12-14 PROCEDURE — 96365 THER/PROPH/DIAG IV INF INIT: CPT

## 2024-12-14 PROCEDURE — 2500000004 HC RX 250 GENERAL PHARMACY W/ HCPCS (ALT 636 FOR OP/ED): Performed by: EMERGENCY MEDICINE

## 2024-12-14 PROCEDURE — 96375 TX/PRO/DX INJ NEW DRUG ADDON: CPT

## 2024-12-14 PROCEDURE — 99291 CRITICAL CARE FIRST HOUR: CPT

## 2024-12-14 PROCEDURE — 93005 ELECTROCARDIOGRAM TRACING: CPT

## 2024-12-14 PROCEDURE — 2020000001 HC ICU ROOM DAILY

## 2024-12-14 PROCEDURE — 83735 ASSAY OF MAGNESIUM: CPT

## 2024-12-14 PROCEDURE — 71045 X-RAY EXAM CHEST 1 VIEW: CPT

## 2024-12-14 PROCEDURE — 87040 BLOOD CULTURE FOR BACTERIA: CPT | Mod: WESLAB | Performed by: EMERGENCY MEDICINE

## 2024-12-14 PROCEDURE — 96374 THER/PROPH/DIAG INJ IV PUSH: CPT | Mod: 59

## 2024-12-14 PROCEDURE — 2500000005 HC RX 250 GENERAL PHARMACY W/O HCPCS: Performed by: EMERGENCY MEDICINE

## 2024-12-14 PROCEDURE — 80053 COMPREHEN METABOLIC PANEL: CPT | Performed by: EMERGENCY MEDICINE

## 2024-12-14 PROCEDURE — 84075 ASSAY ALKALINE PHOSPHATASE: CPT

## 2024-12-14 PROCEDURE — 99285 EMERGENCY DEPT VISIT HI MDM: CPT | Mod: 25 | Performed by: EMERGENCY MEDICINE

## 2024-12-14 PROCEDURE — 71045 X-RAY EXAM CHEST 1 VIEW: CPT | Performed by: RADIOLOGY

## 2024-12-14 PROCEDURE — 70450 CT HEAD/BRAIN W/O DYE: CPT | Performed by: RADIOLOGY

## 2024-12-14 PROCEDURE — 2500000001 HC RX 250 WO HCPCS SELF ADMINISTERED DRUGS (ALT 637 FOR MEDICARE OP): Performed by: SURGERY

## 2024-12-14 PROCEDURE — 85025 COMPLETE CBC W/AUTO DIFF WBC: CPT | Performed by: EMERGENCY MEDICINE

## 2024-12-14 PROCEDURE — 84100 ASSAY OF PHOSPHORUS: CPT

## 2024-12-14 RX ORDER — HYDROXYZINE HYDROCHLORIDE 25 MG/1
25 TABLET, FILM COATED ORAL EVERY 6 HOURS PRN
Status: DISCONTINUED | OUTPATIENT
Start: 2024-12-14 | End: 2024-12-16 | Stop reason: HOSPADM

## 2024-12-14 RX ORDER — FENTANYL CITRATE 50 UG/ML
50 INJECTION, SOLUTION INTRAMUSCULAR; INTRAVENOUS ONCE
Status: COMPLETED | OUTPATIENT
Start: 2024-12-14 | End: 2024-12-14

## 2024-12-14 RX ORDER — VANCOMYCIN HYDROCHLORIDE 1 G/20ML
INJECTION, POWDER, LYOPHILIZED, FOR SOLUTION INTRAVENOUS DAILY PRN
Status: DISCONTINUED | OUTPATIENT
Start: 2024-12-14 | End: 2024-12-16 | Stop reason: HOSPADM

## 2024-12-14 RX ORDER — METOPROLOL TARTRATE 50 MG/1
50 TABLET ORAL 2 TIMES DAILY
Status: DISCONTINUED | OUTPATIENT
Start: 2024-12-14 | End: 2024-12-16 | Stop reason: HOSPADM

## 2024-12-14 RX ORDER — ERGOCALCIFEROL 1.25 MG/1
1250 CAPSULE ORAL
Status: DISCONTINUED | OUTPATIENT
Start: 2024-12-15 | End: 2024-12-16 | Stop reason: HOSPADM

## 2024-12-14 RX ORDER — DIPHENHYDRAMINE HYDROCHLORIDE 50 MG/ML
50 INJECTION INTRAMUSCULAR; INTRAVENOUS ONCE
Status: COMPLETED | OUTPATIENT
Start: 2024-12-14 | End: 2024-12-14

## 2024-12-14 RX ORDER — ACETAMINOPHEN 325 MG/1
975 TABLET ORAL EVERY 8 HOURS PRN
Status: DISCONTINUED | OUTPATIENT
Start: 2024-12-14 | End: 2024-12-14

## 2024-12-14 RX ORDER — ACETAMINOPHEN 160 MG/5ML
650 SOLUTION ORAL EVERY 4 HOURS PRN
Status: DISCONTINUED | OUTPATIENT
Start: 2024-12-14 | End: 2024-12-16 | Stop reason: HOSPADM

## 2024-12-14 RX ORDER — CLONIDINE HYDROCHLORIDE 0.1 MG/1
0.1 TABLET ORAL EVERY 8 HOURS SCHEDULED
Status: DISCONTINUED | OUTPATIENT
Start: 2024-12-14 | End: 2024-12-16 | Stop reason: HOSPADM

## 2024-12-14 RX ORDER — CETIRIZINE HYDROCHLORIDE 10 MG/1
10 TABLET ORAL ONCE
Status: COMPLETED | OUTPATIENT
Start: 2024-12-14 | End: 2024-12-14

## 2024-12-14 RX ORDER — VANCOMYCIN 2 G/400ML
2 INJECTION, SOLUTION INTRAVENOUS ONCE
Status: DISCONTINUED | OUTPATIENT
Start: 2024-12-14 | End: 2024-12-14

## 2024-12-14 RX ORDER — DOCUSATE SODIUM 100 MG/1
100 CAPSULE, LIQUID FILLED ORAL 2 TIMES DAILY
Status: DISCONTINUED | OUTPATIENT
Start: 2024-12-14 | End: 2024-12-16 | Stop reason: HOSPADM

## 2024-12-14 RX ORDER — HEPARIN SODIUM 1000 [USP'U]/ML
1000 INJECTION, SOLUTION INTRAVENOUS; SUBCUTANEOUS
Status: DISCONTINUED | OUTPATIENT
Start: 2024-12-15 | End: 2024-12-16 | Stop reason: HOSPADM

## 2024-12-14 RX ORDER — ACETAMINOPHEN 325 MG/1
650 TABLET ORAL EVERY 4 HOURS PRN
Status: DISCONTINUED | OUTPATIENT
Start: 2024-12-14 | End: 2024-12-16 | Stop reason: HOSPADM

## 2024-12-14 RX ORDER — ASPIRIN 81 MG/1
81 TABLET ORAL DAILY
Status: DISCONTINUED | OUTPATIENT
Start: 2024-12-14 | End: 2024-12-16 | Stop reason: HOSPADM

## 2024-12-14 RX ORDER — METHOCARBAMOL 500 MG/1
750 TABLET, FILM COATED ORAL EVERY 6 HOURS PRN
Status: DISCONTINUED | OUTPATIENT
Start: 2024-12-14 | End: 2024-12-16 | Stop reason: HOSPADM

## 2024-12-14 RX ORDER — ACETAMINOPHEN 325 MG/1
650 TABLET ORAL EVERY 8 HOURS PRN
Status: DISCONTINUED | OUTPATIENT
Start: 2024-12-14 | End: 2024-12-16 | Stop reason: HOSPADM

## 2024-12-14 RX ORDER — OXYCODONE AND ACETAMINOPHEN 5; 325 MG/1; MG/1
1 TABLET ORAL EVERY 6 HOURS PRN
Status: DISCONTINUED | OUTPATIENT
Start: 2024-12-14 | End: 2024-12-16 | Stop reason: HOSPADM

## 2024-12-14 RX ORDER — CALCITRIOL 0.25 UG/1
0.5 CAPSULE ORAL DAILY
Status: DISCONTINUED | OUTPATIENT
Start: 2024-12-14 | End: 2024-12-16 | Stop reason: HOSPADM

## 2024-12-14 RX ORDER — ATORVASTATIN CALCIUM 40 MG/1
40 TABLET, FILM COATED ORAL NIGHTLY
Status: DISCONTINUED | OUTPATIENT
Start: 2024-12-14 | End: 2024-12-16 | Stop reason: HOSPADM

## 2024-12-14 RX ORDER — ACETAMINOPHEN 325 MG/1
650 TABLET ORAL EVERY 4 HOURS PRN
Status: DISCONTINUED | OUTPATIENT
Start: 2024-12-14 | End: 2024-12-14

## 2024-12-14 RX ORDER — ACETAMINOPHEN 650 MG/1
650 SUPPOSITORY RECTAL EVERY 4 HOURS PRN
Status: DISCONTINUED | OUTPATIENT
Start: 2024-12-14 | End: 2024-12-16 | Stop reason: HOSPADM

## 2024-12-14 RX ORDER — ACETAMINOPHEN 325 MG/1
975 TABLET ORAL EVERY 8 HOURS
Status: DISCONTINUED | OUTPATIENT
Start: 2024-12-14 | End: 2024-12-14

## 2024-12-14 RX ADMIN — FENTANYL CITRATE 50 MCG: 50 INJECTION INTRAMUSCULAR; INTRAVENOUS at 01:37

## 2024-12-14 RX ADMIN — SODIUM CHLORIDE 1000 ML: 900 INJECTION, SOLUTION INTRAVENOUS at 01:25

## 2024-12-14 RX ADMIN — PIPERACILLIN SODIUM AND TAZOBACTAM SODIUM 2.25 G: 2; .25 INJECTION, SOLUTION INTRAVENOUS at 13:39

## 2024-12-14 RX ADMIN — ACETAMINOPHEN 650 MG: 325 TABLET ORAL at 09:46

## 2024-12-14 RX ADMIN — PIPERACILLIN SODIUM AND TAZOBACTAM SODIUM 4.5 G: 4; .5 INJECTION, SOLUTION INTRAVENOUS at 01:29

## 2024-12-14 RX ADMIN — SODIUM CHLORIDE 2070 ML: 9 INJECTION, SOLUTION INTRAVENOUS at 01:49

## 2024-12-14 RX ADMIN — CALCITRIOL CAPSULES 0.25 MCG 0.5 MCG: 0.25 CAPSULE ORAL at 09:46

## 2024-12-14 RX ADMIN — DOCUSATE SODIUM 100 MG: 100 CAPSULE, LIQUID FILLED ORAL at 09:46

## 2024-12-14 RX ADMIN — DOCUSATE SODIUM 100 MG: 100 CAPSULE, LIQUID FILLED ORAL at 20:31

## 2024-12-14 RX ADMIN — METHOCARBAMOL TABLETS 750 MG: 500 TABLET, COATED ORAL at 22:20

## 2024-12-14 RX ADMIN — CETIRIZINE HYDROCHLORIDE 10 MG: 10 TABLET, FILM COATED ORAL at 22:04

## 2024-12-14 RX ADMIN — VANCOMYCIN HYDROCHLORIDE 2 G: 5 INJECTION, POWDER, LYOPHILIZED, FOR SOLUTION INTRAVENOUS at 03:03

## 2024-12-14 RX ADMIN — ATORVASTATIN CALCIUM 40 MG: 40 TABLET, FILM COATED ORAL at 20:31

## 2024-12-14 RX ADMIN — ACETAMINOPHEN 975 MG: 325 TABLET ORAL at 05:15

## 2024-12-14 RX ADMIN — OXYCODONE AND ACETAMINOPHEN 1 TABLET: 5; 325 TABLET ORAL at 18:49

## 2024-12-14 RX ADMIN — DIPHENHYDRAMINE HYDROCHLORIDE 50 MG: 50 INJECTION, SOLUTION INTRAMUSCULAR; INTRAVENOUS at 01:37

## 2024-12-14 RX ADMIN — ACETAMINOPHEN 975 MG: 325 TABLET ORAL at 05:10

## 2024-12-14 RX ADMIN — ASPIRIN 81 MG: 81 TABLET, COATED ORAL at 09:46

## 2024-12-14 RX ADMIN — PIPERACILLIN SODIUM AND TAZOBACTAM SODIUM 2.25 G: 2; .25 INJECTION, SOLUTION INTRAVENOUS at 20:31

## 2024-12-14 RX ADMIN — LEVETIRACETAM 750 MG: 500 TABLET, FILM COATED ORAL at 20:31

## 2024-12-14 SDOH — ECONOMIC STABILITY: INCOME INSECURITY: IN THE PAST 12 MONTHS HAS THE ELECTRIC, GAS, OIL, OR WATER COMPANY THREATENED TO SHUT OFF SERVICES IN YOUR HOME?: NO

## 2024-12-14 SDOH — SOCIAL STABILITY: SOCIAL NETWORK
DO YOU BELONG TO ANY CLUBS OR ORGANIZATIONS SUCH AS CHURCH GROUPS, UNIONS, FRATERNAL OR ATHLETIC GROUPS, OR SCHOOL GROUPS?: NO

## 2024-12-14 SDOH — HEALTH STABILITY: PHYSICAL HEALTH: ON AVERAGE, HOW MANY MINUTES DO YOU ENGAGE IN EXERCISE AT THIS LEVEL?: 0 MIN

## 2024-12-14 SDOH — SOCIAL STABILITY: SOCIAL INSECURITY: ARE YOU OR HAVE YOU BEEN THREATENED OR ABUSED PHYSICALLY, EMOTIONALLY, OR SEXUALLY BY ANYONE?: NO

## 2024-12-14 SDOH — SOCIAL STABILITY: SOCIAL INSECURITY: DO YOU FEEL UNSAFE GOING BACK TO THE PLACE WHERE YOU ARE LIVING?: NO

## 2024-12-14 SDOH — SOCIAL STABILITY: SOCIAL INSECURITY: ARE YOU MARRIED, WIDOWED, DIVORCED, SEPARATED, NEVER MARRIED, OR LIVING WITH A PARTNER?: DIVORCED

## 2024-12-14 SDOH — SOCIAL STABILITY: SOCIAL NETWORK: HOW OFTEN DO YOU ATTEND MEETINGS OF THE CLUBS OR ORGANIZATIONS YOU BELONG TO?: NEVER

## 2024-12-14 SDOH — ECONOMIC STABILITY: FOOD INSECURITY: WITHIN THE PAST 12 MONTHS, YOU WORRIED THAT YOUR FOOD WOULD RUN OUT BEFORE YOU GOT THE MONEY TO BUY MORE.: NEVER TRUE

## 2024-12-14 SDOH — SOCIAL STABILITY: SOCIAL INSECURITY: HAVE YOU HAD THOUGHTS OF HARMING ANYONE ELSE?: NO

## 2024-12-14 SDOH — ECONOMIC STABILITY: FOOD INSECURITY: WITHIN THE PAST 12 MONTHS, THE FOOD YOU BOUGHT JUST DIDN'T LAST AND YOU DIDN'T HAVE MONEY TO GET MORE.: NEVER TRUE

## 2024-12-14 SDOH — SOCIAL STABILITY: SOCIAL INSECURITY: ABUSE: ADULT

## 2024-12-14 SDOH — ECONOMIC STABILITY: HOUSING INSECURITY: AT ANY TIME IN THE PAST 12 MONTHS, WERE YOU HOMELESS OR LIVING IN A SHELTER (INCLUDING NOW)?: NO

## 2024-12-14 SDOH — SOCIAL STABILITY: SOCIAL NETWORK: HOW OFTEN DO YOU ATTEND CHURCH OR RELIGIOUS SERVICES?: MORE THAN 4 TIMES PER YEAR

## 2024-12-14 SDOH — SOCIAL STABILITY: SOCIAL INSECURITY: WITHIN THE LAST YEAR, HAVE YOU BEEN AFRAID OF YOUR PARTNER OR EX-PARTNER?: NO

## 2024-12-14 SDOH — ECONOMIC STABILITY: TRANSPORTATION INSECURITY: IN THE PAST 12 MONTHS, HAS LACK OF TRANSPORTATION KEPT YOU FROM MEDICAL APPOINTMENTS OR FROM GETTING MEDICATIONS?: NO

## 2024-12-14 SDOH — SOCIAL STABILITY: SOCIAL INSECURITY: DO YOU FEEL ANYONE HAS EXPLOITED OR TAKEN ADVANTAGE OF YOU FINANCIALLY OR OF YOUR PERSONAL PROPERTY?: NO

## 2024-12-14 SDOH — SOCIAL STABILITY: SOCIAL INSECURITY: HAS ANYONE EVER THREATENED TO HURT YOUR FAMILY OR YOUR PETS?: NO

## 2024-12-14 SDOH — SOCIAL STABILITY: SOCIAL NETWORK: HOW OFTEN DO YOU GET TOGETHER WITH FRIENDS OR RELATIVES?: MORE THAN THREE TIMES A WEEK

## 2024-12-14 SDOH — SOCIAL STABILITY: SOCIAL INSECURITY: ARE THERE ANY APPARENT SIGNS OF INJURIES/BEHAVIORS THAT COULD BE RELATED TO ABUSE/NEGLECT?: NO

## 2024-12-14 SDOH — SOCIAL STABILITY: SOCIAL INSECURITY: DOES ANYONE TRY TO KEEP YOU FROM HAVING/CONTACTING OTHER FRIENDS OR DOING THINGS OUTSIDE YOUR HOME?: NO

## 2024-12-14 SDOH — ECONOMIC STABILITY: HOUSING INSECURITY: IN THE LAST 12 MONTHS, WAS THERE A TIME WHEN YOU WERE NOT ABLE TO PAY THE MORTGAGE OR RENT ON TIME?: NO

## 2024-12-14 SDOH — SOCIAL STABILITY: SOCIAL INSECURITY: WITHIN THE LAST YEAR, HAVE YOU BEEN HUMILIATED OR EMOTIONALLY ABUSED IN OTHER WAYS BY YOUR PARTNER OR EX-PARTNER?: NO

## 2024-12-14 SDOH — HEALTH STABILITY: PHYSICAL HEALTH: ON AVERAGE, HOW MANY DAYS PER WEEK DO YOU ENGAGE IN MODERATE TO STRENUOUS EXERCISE (LIKE A BRISK WALK)?: 0 DAYS

## 2024-12-14 SDOH — SOCIAL STABILITY: SOCIAL INSECURITY: HAVE YOU HAD ANY THOUGHTS OF HARMING ANYONE ELSE?: NO

## 2024-12-14 SDOH — SOCIAL STABILITY: SOCIAL INSECURITY: WERE YOU ABLE TO COMPLETE ALL THE BEHAVIORAL HEALTH SCREENINGS?: YES

## 2024-12-14 SDOH — ECONOMIC STABILITY: FOOD INSECURITY: HOW HARD IS IT FOR YOU TO PAY FOR THE VERY BASICS LIKE FOOD, HOUSING, MEDICAL CARE, AND HEATING?: NOT HARD AT ALL

## 2024-12-14 SDOH — ECONOMIC STABILITY: HOUSING INSECURITY: IN THE PAST 12 MONTHS, HOW MANY TIMES HAVE YOU MOVED WHERE YOU WERE LIVING?: 0

## 2024-12-14 ASSESSMENT — ACTIVITIES OF DAILY LIVING (ADL)
ASSISTIVE_DEVICE: EYEGLASSES;CANE
LACK_OF_TRANSPORTATION: NO
JUDGMENT_ADEQUATE_SAFELY_COMPLETE_DAILY_ACTIVITIES: YES
FEEDING YOURSELF: INDEPENDENT
GROOMING: INDEPENDENT
PATIENT'S MEMORY ADEQUATE TO SAFELY COMPLETE DAILY ACTIVITIES?: YES
BATHING: INDEPENDENT
LACK_OF_TRANSPORTATION: NO
HEARING - RIGHT EAR: FUNCTIONAL
HEARING - LEFT EAR: FUNCTIONAL
TOILETING: INDEPENDENT
WALKS IN HOME: INDEPENDENT
ADEQUATE_TO_COMPLETE_ADL: YES
DRESSING YOURSELF: INDEPENDENT

## 2024-12-14 ASSESSMENT — COGNITIVE AND FUNCTIONAL STATUS - GENERAL
DAILY ACTIVITIY SCORE: 18
EATING MEALS: A LITTLE
MOVING TO AND FROM BED TO CHAIR: A LITTLE
HELP NEEDED FOR BATHING: A LITTLE
MOBILITY SCORE: 18
STANDING UP FROM CHAIR USING ARMS: A LITTLE
PERSONAL GROOMING: A LITTLE
TURNING FROM BACK TO SIDE WHILE IN FLAT BAD: A LITTLE
TOILETING: A LITTLE
DRESSING REGULAR LOWER BODY CLOTHING: A LITTLE
CLIMB 3 TO 5 STEPS WITH RAILING: A LITTLE
DRESSING REGULAR UPPER BODY CLOTHING: A LITTLE
MOVING FROM LYING ON BACK TO SITTING ON SIDE OF FLAT BED WITH BEDRAILS: A LITTLE
PATIENT BASELINE BEDBOUND: NO
WALKING IN HOSPITAL ROOM: A LITTLE

## 2024-12-14 ASSESSMENT — LIFESTYLE VARIABLES
EVER HAD A DRINK FIRST THING IN THE MORNING TO STEADY YOUR NERVES TO GET RID OF A HANGOVER: NO
AUDIT-C TOTAL SCORE: 0
HOW OFTEN DO YOU HAVE A DRINK CONTAINING ALCOHOL: NEVER
SKIP TO QUESTIONS 9-10: 1
HOW OFTEN DO YOU HAVE 6 OR MORE DRINKS ON ONE OCCASION: NEVER
HAVE PEOPLE ANNOYED YOU BY CRITICIZING YOUR DRINKING: NO
AUDIT-C TOTAL SCORE: 0
HAVE YOU EVER FELT YOU SHOULD CUT DOWN ON YOUR DRINKING: NO
HOW MANY STANDARD DRINKS CONTAINING ALCOHOL DO YOU HAVE ON A TYPICAL DAY: PATIENT DOES NOT DRINK
TOTAL SCORE: 0
EVER FELT BAD OR GUILTY ABOUT YOUR DRINKING: NO

## 2024-12-14 ASSESSMENT — PAIN SCALES - GENERAL
PAINLEVEL_OUTOF10: 10 - WORST POSSIBLE PAIN
PAINLEVEL_OUTOF10: 0 - NO PAIN
PAINLEVEL_OUTOF10: 0 - NO PAIN
PAINLEVEL_OUTOF10: 10 - WORST POSSIBLE PAIN

## 2024-12-14 ASSESSMENT — PAIN - FUNCTIONAL ASSESSMENT
PAIN_FUNCTIONAL_ASSESSMENT: 0-10

## 2024-12-14 ASSESSMENT — PAIN DESCRIPTION - ORIENTATION
ORIENTATION: RIGHT;LEFT
ORIENTATION: RIGHT;LEFT

## 2024-12-14 ASSESSMENT — PAIN DESCRIPTION - DESCRIPTORS
DESCRIPTORS: ACHING

## 2024-12-14 ASSESSMENT — PAIN DESCRIPTION - LOCATION
LOCATION: LEG
LOCATION: LEG

## 2024-12-14 ASSESSMENT — PAIN DESCRIPTION - PROGRESSION: CLINICAL_PROGRESSION: NOT CHANGED

## 2024-12-14 ASSESSMENT — PAIN SCALES - PAIN ASSESSMENT IN ADVANCED DEMENTIA (PAINAD)
TOTALSCORE: MEDICATION (SEE MAR)
TOTALSCORE: MEDICATION (SEE MAR);REPOSITIONED

## 2024-12-14 ASSESSMENT — PAIN DESCRIPTION - PAIN TYPE: TYPE: CHRONIC PAIN

## 2024-12-14 NOTE — PROGRESS NOTES
Occupational Therapy                 Therapy Communication Note    Patient Name: Batsheva Page  MRN: [id number]  Department: [facility] 3 E ICU  Room: 18/18-A  Today's Date: 12/14/2024     Discipline: Occupational Therapy          Missed Visit Reason: Missed Visit Reason: Cancel  Patient awaiting EKG and with low HR this AM in the 40s. Will hold eval.     Missed Time: Cancel    Comment:

## 2024-12-14 NOTE — SIGNIFICANT EVENT
"Attending note    Patient seen and examined.  Vital signs reviewed.  Called by RN regarding complaint of 10 out of 10 pain \"all over\" by patient.  Patient is admitted for suspicion of line sepsis with respect to her dialysis catheter.    Physical exam  BP 92/57   Pulse 51   Temp 36.3 °C (97.4 °F)   Resp 12   Ht 1.651 m (5' 5\")   Wt 66.9 kg (147 lb 7.8 oz)   SpO2 95%   BMI 24.54 kg/m²     Alert and appropriate in conversation  Crackles bilateral bases of lungs  Heart murmur related to replaced valves  Abdomen soft nontender nondistended   Pulses not palpable DP position of feet  Trace to 1+ lower extremity edema    Assessment and plan  49-year-old woman with multiple medical problems, including end-stage renal disease on dialysis, MSSA/MRSA bacteremia, endocarditis status post aortic valve replacement and mitral valve replacement, hypertension, hyperlipidemia, seizures, anemia and hyponatremia who presents with suspected dialysis catheter infection.    Line sepsis: Continue antibiotics. Discuss line management with nephrology. Patient has had difficult access history.  2.   Pain: Will address with prn meds. Starting with tylenol. Will add additional prn medications if needed.  3.   Weakness: Physical therapy consult    Lianne Ibarra MD  12/14/2024  8:57 AM      "

## 2024-12-14 NOTE — CARE PLAN
The patient's goals for the shift include      The clinical goals for the shift include hemodynamically stable      Problem: Pain - Adult  Goal: Verbalizes/displays adequate comfort level or baseline comfort level  Outcome: Progressing     Problem: Safety - Adult  Goal: Free from fall injury  Outcome: Progressing     Problem: Discharge Planning  Goal: Discharge to home or other facility with appropriate resources  Outcome: Progressing     Problem: Chronic Conditions and Co-morbidities  Goal: Patient's chronic conditions and co-morbidity symptoms are monitored and maintained or improved  Outcome: Progressing     Problem: Fall/Injury  Goal: Not fall by end of shift  Outcome: Progressing  Goal: Be free from injury by end of the shift  Outcome: Progressing  Goal: Verbalize understanding of personal risk factors for fall in the hospital  Outcome: Progressing  Goal: Verbalize understanding of risk factor reduction measures to prevent injury from fall in the home  Outcome: Progressing  Goal: Use assistive devices by end of the shift  Outcome: Progressing  Goal: Pace activities to prevent fatigue by end of the shift  Outcome: Progressing     Problem: Skin  Goal: Decreased wound size/increased tissue granulation at next dressing change  Outcome: Progressing  Flowsheets (Taken 12/14/2024 1007)  Decreased wound size/increased tissue granulation at next dressing change:   Promote sleep for wound healing   Protective dressings over bony prominences   Utilize specialty bed per algorithm  Goal: Participates in plan/prevention/treatment measures  Outcome: Progressing  Flowsheets (Taken 12/14/2024 1007)  Participates in plan/prevention/treatment measures:   Discuss with provider PT/OT consult   Elevate heels   Increase activity/out of bed for meals  Goal: Prevent/manage excess moisture  Outcome: Progressing  Flowsheets (Taken 12/14/2024 1007)  Prevent/manage excess moisture:   Cleanse incontinence/protect with barrier cream    Monitor for/manage infection if present   Follow provider orders for dressing changes   Moisturize dry skin  Goal: Prevent/minimize sheer/friction injuries  Outcome: Progressing  Flowsheets (Taken 12/14/2024 1007)  Prevent/minimize sheer/friction injuries:   Complete micro-shifts as needed if patient unable. Adjust patient position to relieve pressure points, not a full turn   Increase activity/out of bed for meals   Use pull sheet   Turn/reposition every 2 hours/use positioning/transfer devices   Utilize specialty bed per algorithm  Goal: Promote/optimize nutrition  Outcome: Progressing  Flowsheets (Taken 12/14/2024 1007)  Promote/optimize nutrition:   Assist with feeding   Monitor/record intake including meals   Consume > 50% meals/supplements   Discuss with provider if NPO > 2 days  Goal: Promote skin healing  Outcome: Progressing  Flowsheets (Taken 12/14/2024 1007)  Promote skin healing:   Turn/reposition every 2 hours/use positioning/transfer devices   Ensure correct size (line/device) and apply per  instructions   Assess skin/pad under line(s)/device(s)   Protective dressings over bony prominences

## 2024-12-14 NOTE — H&P
[facility] Critical Care Medicine       Date:  12/14/2024  Patient:  Batsheva Page  YOB: 1975  MRN:  [id number]   Admit Date:  12/14/2024    Chief Complaint   Patient presents with    Extremity Weakness    Weakness, Gen     Generalized weakness, hot and cold flashes.  Recent surgical procedure. Hx of sepsis.           History of Present Illness:  Batsheva Page is a 49 y.o. year old female patient with Past Medical History of End-stage renal disease on hemodialysis, recurrent MSSA/MRSA bacteremia, endocarditis s/p aortic valve replacement and mitral valve replacement, hypertension, HLD, Seizures, anemia, hyponatremia, presented to the emergency department today with lightheadedness and generalized malaise which she reports is how she feels when she is getting septic.  She was recently admitted to the ICU at [facility] for MRSA bacteremia secondary to infected dialysis line placement.  Her original dialysis line was in her right subclavian, was moved to the right femoral.  Her new dialysis line was placed on 12/10 by IR.  She was discharged with instructions to have vancomycin with dialysis upon discharge for 6 weeks with an end date of 1/16/2025.  The patient was discharged from [facility] on 12/12.  She had dialysis on Wednesday and Thursday, she did not go on Friday which is her normal day.      Upon arrival to the emergency department patient was found to be hypotensive with a systolic blood pressure in the 70s.  She is afebrile at that time.  No significant increase in her leukocytosis from time of discharge.  Her WBCs today are 22.4, hemoglobin stable, lactate 2.5, sodium 132, creatinine 6.59, BUN 45.  She received approximately 3.5 L of fluids in the emergency department as well as vancomycin and Zosyn.      Interval ICU Events:  12/14: Admitted to the ICU for septic shock    Medical History:  Past Medical History:   Diagnosis Date    Aortic valve replaced 2022    CHF (congestive  heart failure)     Chronic pain     Coronary artery disease     Disease of thyroid gland     ESRD (end stage renal disease) (Multi)     H/O mitral valve replacement 2013    and 2022    Heart disease     History of transfusion     Hypertension     Mitral valve regurgitation     Seizures (Multi)     Stroke (Multi)      Past Surgical History:   Procedure Laterality Date    AORTIC VALVE REPLACEMENT  09/26/2022    bioprosthetic    CORONARY ARTERY BYPASS GRAFT      IR CVC  01/12/2024    exchange    IR CVC  05/07/2024    exchange    IR CVC  08/20/2024    removal    IR CVC TUNNELED  09/09/2022    IR CVC TUNNELED 9/9/2022 [facility]    IR CVC TUNNELED  12/28/2022    IR CVC TUNNELED 12/28/2022 [facility] INPATIENT [facility]GAC[facility]    MITRAL VALVE REPAIR  2013    MITRAL VALVE REPLACEMENT  09/26/2022    bioprosthetic    PARATHYROIDECTOMY      TRICUSPID VALVULOPLASTY  2013    US GUIDED PERCUTANEOUS PLACEMENT  07/14/2022    US GUIDED PERCUTANEOUS PLACEMENT [facility]     Medications Prior to Admission   Medication Sig Dispense Refill Last Dose/Taking    acetaminophen (Tylenol) 325 mg tablet Take 2 tablets (650 mg) by mouth every 6 hours as needed for mild pain (1 - 3). 30 tablet 0     aspirin 81 mg EC tablet Take 1 tablet (81 mg) by mouth once daily. 30 tablet 2     atorvastatin (Lipitor) 40 mg tablet Take 1 tablet (40 mg) by mouth once daily.       calcitriol (Rocaltrol) 0.25 mcg capsule Take 2 capsules (0.5 mcg) by mouth once daily.       cloNIDine (Catapres) 0.1 mg tablet Take 1 tablet (0.1 mg) by mouth every 8 hours. 90 tablet 3     epoetin brandy-epbx (RETACRIT) 20,000 unit/mL solution Inject 6,440 Units under the skin 3 times a week. Do not start before January 17, 2024. 30 mL 2     ergocalciferol (Vitamin D-2) 1.25 MG (56093 UT) capsule Take 1 capsule (1,250 mcg) by mouth 1 (one) time per week.       levETIRAcetam (Keppra) 500 mg tablet Take 1.5 tablets (750 mg) by mouth once daily at bedtime.       metoprolol tartrate  "(Lopressor) 25 mg tablet Take 1 tablet (25 mg) by mouth 2 times a day. 60 tablet 0     oxyCODONE-acetaminophen (Percocet) 5-325 mg tablet Take 1 tablet by mouth every 6 hours as needed for moderate pain (4 - 6). 5 tablet 0     pregabalin (Lyrica) 25 mg capsule Take 2 capsules (50 mg) by mouth 2 times a day. 120 capsule 0     promethazine (Phenergan) 25 mg tablet Take 1 tablet (25 mg) by mouth once daily as needed.        Kayexalate, Metoclopramide hcl, Prochlorperazine, Zofran [ondansetron hcl], and Ondansetron  Social History     Tobacco Use    Smoking status: Never    Smokeless tobacco: Never   Vaping Use    Vaping status: Never Used   Substance Use Topics    Alcohol use: Never    Drug use: Never     Family History   Problem Relation Name Age of Onset    No Known Problems Mother      No Known Problems Father         Review of Systems:  14 point review of systems was completed and negative except for those specially mention in my HPI    Physical Exam:    Heart Rate:  [51-82]   Temp:  [36.2 °C (97.2 °F)-36.4 °C (97.5 °F)]   Resp:  [7-18]   BP: (71-91)/(54-66)   Height:  [165.1 cm (5' 5\")]   Weight:  [69 kg (152 lb 1.6 oz)]   SpO2:  [94 %-96 %]     Physical Exam  Vitals reviewed.   Constitutional:       Appearance: She is ill-appearing.   HENT:      Head: Normocephalic and atraumatic.      Right Ear: External ear normal.      Left Ear: External ear normal.      Nose: Nose normal.      Mouth/Throat:      Mouth: Mucous membranes are moist.      Pharynx: Oropharynx is clear.   Eyes:      Pupils: Pupils are equal, round, and reactive to light.      Comments: Blurred vision    Cardiovascular:      Rate and Rhythm: Regular rhythm. Bradycardia present.      Pulses: Normal pulses.      Heart sounds: Murmur heard.   Pulmonary:      Effort: Pulmonary effort is normal.      Breath sounds: Normal breath sounds.   Abdominal:      General: Bowel sounds are decreased.      Palpations: Abdomen is soft.      Tenderness: There is no " abdominal tenderness.   Musculoskeletal:      Right lower leg: No edema.      Left lower leg: No edema.      Comments: Right femoral dialysis access    Skin:     General: Skin is warm and dry.      Capillary Refill: Capillary refill takes less than 2 seconds.   Neurological:      General: No focal deficit present.      Mental Status: She is easily aroused. She is lethargic.   Psychiatric:         Speech: Speech normal.         Behavior: Behavior is cooperative.         Objective:    I have reviewed all medications, laboratory results, and imaging pertinent for today's encounter    Assessment/Plan:    I am currently managing this critically ill patient for the following problems:    Neuro/Psych/Pain Ctrl/Sedation:  #Blurred vision  #Lightheadedness  #Hx seizure  -Obtain CT head  -[facility] ICU/delirium protocol  -Tylenol as needed  -Lightheadedness may be secondary to hypotension  -Continue home Keppra    Respiratory/ENT:  -No acute issues  -Maintain oxygen saturation greater than 92%    Cardiovascular:  #Septic shock  #Hx endocarditis s/p aortic valve replacement and mitral valve replacement  #Hx Hypertension, HLD  -Received approximately 3.5 L in the emergency department  -LUARA from 12/5 with a EF of 65 to 70%.  There was a small mobile right atrial mass seen on exam, that was likely attached to the SVC catheter in place at the time, was concerning for infection.  No vegetation seen on the valves.  -Continuous cardiac monitoring  -Hold BP meds  -MAP greater than 65  -Continue ASA and statin    GI:  -No acute issues  -Renal diet  -Colace bowel regimen    Renal/Volume Status (Intra & Extravascular):  #Hyponatremia  #End-stage renal disease-HD MWF  -Consult nephrology  -Renal doses were indicated  -Strict I & O  -Monitor and replete electrolytes as needed  -CMP    Endocrine  -No acute issues  -Monitor for S/S hypo/hyperglycemia    Infectious Disease:  #Septic shock likely 2/2 MSSA bacteremia  -Pharmacy to dose  vancomycin  -Start Zosyn  -Follow blood cultures  -Consult infectious disease  -Repeat lactate, originally 2.5  -Leukocytosis originally 22.4 improved to 16.4 on repeat labs    Heme/Onc:  #Anemia  -Baseline around 11  -Daily CBC  -Monitor for S/S bleeding    OBGYN/MSK/SKIN:  #Chronic pruritus  -Hydroxyzine as needed  -PT OT evaluation    Ethics/Code Status:  FULL     :  DVT Prophylaxis: SCD  GI Prophylaxis: None  Bowel Regimen: Colace   Diet: Renal  CVC: Dialysis line right femoral   [place]: none  [place]: none  Restraints: none  Dispo: ICU    Critical Care Time:  60 minutes spent in preparing to see patient (I.e. review of medical records), evaluation of diagnostics (I.e. labs, imaging, etc.), documentation, discussing plan of care with patient/ family/ caregiver, and/ or coordination of care with multidisciplinary team. Time does not include completion of procedure time.       ORI Zayas-CNP  Critical Care Medicine  [facility]

## 2024-12-14 NOTE — SIGNIFICANT EVENT
Called to bedside again to speak with patient who now is stating that she would like to leave AMA.  Patient has been complaining that she gets itching with all antibiotics unless she is given benadryl. Additionally, she is  complaining of pain.  Patient cannot state where her pain is, just that it is all over.  After further discussion patient's daughter stated that she is on oxycodone at home.  Patient states she takes oxycodone 5mg about 4 times a day.  It is written as an as needed medication, but it seems as though she is taking it on a regular basis.  The order is in the chart and the nurse had released it, when she called me back to state that patient wants to leave.    Patient states that she does not think she is going to need to get any more antibiotics for a few days and that she can be more comfortable at home and that she will be fine at home and that she is in the hospital frequently and she does not want to be here.  I did explain to the patient that we would be monitoring her cultures and her white count and other things and adjusting medications and antibiotics as needed.  I told her that I did not think it was safe for her to go home.  I told her that I thought she can get sick and die at home.  I also told her that the plan that she had did not align with medical practice.  Patient is A&O x 4 and stated understanding of her condition.    I asked the patient if she had any history of anxiety or was currently getting treatment for anxiety.  She states that she does have anxiety but she is not currently getting treatment.  She did not seem interested in getting treatment, but I did tell her that I think that her anxiety was getting in the way of her medical care and that it is something that should be addressed.    I advised the patient that she should not leave, but if she decided to leave there is paperwork to be signed for her to leave AGAINST MEDICAL ADVICE.    Lianne Ibarra,  MD  12/14/2024  5:45 PM

## 2024-12-14 NOTE — CONSULTS
Vancomycin Dosing by Pharmacy- INITIAL (HEMODIALYSIS)    Batsheva Page is a 49 y.o. year old female who Pharmacy has been consulted for vancomycin dosing for sepsis/line infection. Based on the patient's indication and renal status this patient will be dosed based on a Pre-HD level of 20-25 mcg/mL.     Patient is currently on hemodialysis MWF. They missed their Friday session, so they may receive a session on Saturday or Sunday over the weekend. Will follow the renal plan daily and adjust dosing as needed.    Visit Vitals  BP 98/61   Pulse 52   Temp 36.4 °C (97.5 °F) (Oral)   Resp 11           Lab Results   Component Value Date    CREATININE 6.59 (H) 12/14/2024    CREATININE 6.28 (H) 12/12/2024    CREATININE 10.54 (H) 12/11/2024    CREATININE 9.33 (H) 12/10/2024       No intake/output data recorded.    Assessment/Plan     Initial Loading Dosing: has already been given a loading dose of 2000 mg   Vancomycin maintenance dose: 500 mg after each dialysis session MWF  Pre-HD level will be obtained on 12/16 at 0500. May be obtained sooner if clinically indicated.   Will continue to monitor renal function daily while on vancomycin and order serum creatinine at least every 48 hours if not already ordered.  Follow for continued vancomycin needs, clinical response, and signs/symptoms of toxicity.     Sushil Pressley, PharmD

## 2024-12-14 NOTE — PROGRESS NOTES
Physical Therapy                 Therapy Communication Note    Patient Name: Batsheva Page  MRN: [id number]  Department: [facility] 3 E ICU  Room: 18/18-A  Today's Date: 12/14/2024     Discipline: Physical Therapy    PT Missed Visit: Yes     Missed Visit Reason: Cancel   (Patient awaiting EKG and with low HR this AM in the 40s. Will hold eval.)    Missed Time: Cancel    Comment:

## 2024-12-14 NOTE — ED PROVIDER NOTES
EMERGENCY DEPARTMENT ENCOUNTER      Pt Name: Batsheva Page  MRN: [id number]  Birthdate 1975  Date of evaluation: 12/14/2024  ED Provider: Regla Spaulding DO     CHIEF COMPLAINT       Chief Complaint   Patient presents with    Extremity Weakness    Weakness, Gen     Generalized weakness, hot and cold flashes.  Recent surgical procedure. Hx of sepsis.         HISTORY OF PRESENT ILLNESS    Batsheva Page is a 49 y.o. who presents to the emergency department via private vehicle with concern for recurrent sepsis.  She was recently discharged yesterday, 12/12, from [facility] after being admitted with sepsis from dialysis catheter.  She has a history of frequent bloodstream infections from catheters.  She has been on hemodialysis for greater than the past 20 years secondary to renal failure of uncertain etiology.  She states that she felt well at the time of discharge however later on the day she felt slightly lightheaded.  This morning she had a lightheadedness that worsened significantly with movement.  She began to have foggy vision and generalized malaise which she states happens when she has sepsis.  She did receive a hemodialysis session on Wednesday and Thursday.  She did not go today given the recent sessions.  She denies any shortness of breath or chest pain.  She does have chronic pain at her numerous scarred sites from previous lines.  She had a dialysis catheter in her neck removed and one placed into her groin prior to her discharge.    REVIEW OF SYSTEMS     Focused ROS performed and negative other than as listed in HPI    PAST MEDICAL HISTORY     Past Medical History:   Diagnosis Date    Aortic valve replaced 2022    CHF (congestive heart failure)     Chronic pain     Coronary artery disease     Disease of thyroid gland     ESRD (end stage renal disease) (Multi)     H/O mitral valve replacement 2013    and 2022    Heart disease     History of transfusion     Hypertension     Mitral valve  regurgitation     Seizures (Multi)     Stroke (Multi)        SURGICAL HISTORY       Past Surgical History:   Procedure Laterality Date    AORTIC VALVE REPLACEMENT  09/26/2022    bioprosthetic    CORONARY ARTERY BYPASS GRAFT      IR CVC  01/12/2024    exchange    IR CVC  05/07/2024    exchange    IR CVC  08/20/2024    removal    IR CVC TUNNELED  09/09/2022    [place] CVC TUNNELED 9[place]9/20[place] [facility]    IR CVC TUNNELED  12/28/2022    IR CVC TUNNELED 12/28/2022 [facility]    MITRAL VALVE REPAIR  2013    MITRAL VALVE REPLACEMENT  09/26/2022    bioprosthetic    PARATHYROIDECTOMY      TRICUSPID VALVULOPLASTY  2013    US GUIDED PERCUTANEOUS PLACEMENT  07/14/2022    US GUIDED PERCUTANEOUS PLACEMENT [facility]       CURRENT MEDICATIONS       Previous Medications    ACETAMINOPHEN (TYLENOL) 325 MG TABLET    Take 2 tablets (650 mg) by mouth every 6 hours as needed for mild pain (1 - 3).    ASPIRIN 81 MG EC TABLET    Take 1 tablet (81 mg) by mouth once daily.    ATORVASTATIN (LIPITOR) 40 MG TABLET    Take 1 tablet (40 mg) by mouth once daily.    CALCITRIOL (ROCALTROL) 0.25 MCG CAPSULE    Take 2 capsules (0.5 mcg) by mouth once daily.    CLONIDINE (CATAPRES) 0.1 MG TABLET    Take 1 tablet (0.1 mg) by mouth every 8 hours.    EPOETIN REFUGIO-EPBX (RETACRIT) 20,000 UNIT/ML SOLUTION    Inject 6,440 Units under the skin 3 times a week. Do not start before January 17, 2024.    ERGOCALCIFEROL (VITAMIN D-2) 1.25 MG (58941 UT) CAPSULE    Take 1 capsule (1,250 mcg) by mouth 1 (one) time per week.    LEVETIRACETAM (KEPPRA) 500 MG TABLET    Take 1.5 tablets (750 mg) by mouth once daily at bedtime.    METOPROLOL TARTRATE (LOPRESSOR) 25 MG TABLET    Take 1 tablet (25 mg) by mouth 2 times a day.    OXYCODONE-ACETAMINOPHEN (PERCOCET) 5-325 MG TABLET    Take 1 tablet by mouth every 6 hours as needed for moderate pain (4 - 6).    PREGABALIN (LYRICA) 25 MG CAPSULE    Take 2 capsules (50 mg) by mouth 2 times a day.     PROMETHAZINE (PHENERGAN) 25 MG TABLET    Take 1 tablet (25 mg) by mouth once daily as needed.       ALLERGIES     Kayexalate, Metoclopramide hcl, Prochlorperazine, Zofran [ondansetron hcl], and Ondansetron    FAMILY HISTORY       Family History   Problem Relation Name Age of Onset    No Known Problems Mother      No Known Problems Father          SOCIAL HISTORY       Social History     Socioeconomic History    Marital status:    Tobacco Use    Smoking status: Never    Smokeless tobacco: Never   Vaping Use    Vaping status: Never Used   Substance and Sexual Activity    Alcohol use: Never    Drug use: Never     Social Drivers of Health     Financial Resource Strain: Low Risk  (12/3/2024)    Overall Financial Resource Strain (CARDIA)     Difficulty of Paying Living Expenses: Not very hard   Food Insecurity: No Food Insecurity (12/3/2024)    Hunger Vital Sign     Worried About Running Out of Food in the Last Year: Never true     Ran Out of Food in the Last Year: Never true   Transportation Needs: No Transportation Needs (12/3/2024)    PRAPARE - Transportation     Lack of Transportation (Medical): No     Lack of Transportation (Non-Medical): No   Physical Activity: Inactive (12/3/2024)    Exercise Vital Sign     Days of Exercise per Week: 0 days     Minutes of Exercise per Session: 0 min   Stress: No Stress Concern Present (12/3/2024)    [employer] - Occupational Stress Questionnaire     Feeling of Stress : Not at all   Social Connections: Moderately Isolated (4/29/2024)    Social Connection and Isolation Panel [NHANES]     Frequency of Communication with Friends and Family: More than three times a week     Frequency of Social Gatherings with Friends and Family: More than three times a week     Attends Jain Services: More than 4 times per year     Active Member of Clubs or Organizations: No     Attends Club or Organization Meetings: Never     Marital Status:    Intimate  Partner Violence: Not At Risk (12/3/2024)    Humiliation, Afraid, Rape, and Kick questionnaire     Fear of Current or Ex-Partner: No     Emotionally Abused: No     Physically Abused: No     Sexually Abused: No   Housing Stability: Low Risk  (12/3/2024)    Housing Stability Vital Sign     Unable to Pay for Housing in the Last Year: No     Number of Times Moved in the Last Year: 0     Homeless in the Last Year: No       PHYSICAL EXAM       ED Triage Vitals [12/14/24 0045]   Temperature Heart Rate Respirations BP   36.2 °C (97.2 °F) 82 18 71/60      Pulse Ox Temp Source Heart Rate Source Patient Position   96 % Tympanic -- --      BP Location FiO2 (%)     -- --        General: Appears chronically ill, fatigued, no acute distress, alert  Head: Head atraumatic; normocephalic  Eyes: normal inspection; no icterus  ENT: mucosa moist without lesion  Neck: Normal inspection, no meningeal signs  Resp: Normal breath sounds, no wheeze or crackles; No respiratory distress  Chest Wall: no tenderness or deformity  Heart: Heart rate and rhythm regular; 4 out of 6 systolic murmur left upper sternal border  Abdomen: Soft, Non-tender; No distention, guarding, rigidity, or rebound  MSK: Normal appearance; Moves all extremities; No Pedal edema; dialysis catheter right upper thigh with surrounding ecchymosis without erythema, the area is tender to palpation, there is no fluctuance, firmness, induration  Neuro: Alert; no focal deficits, moves all extremities  Psych: Mood and Affect normal  Skin: Color appropriate; warm; Dry    DIAGNOSTIC RESULTS   Lab and radiology results are independently interpreted unless noted below.  RADIOLOGY (Per Emergency Physician):     Interpretation per the Radiologist below, if available at the time of this note:  XR chest 1 view   Final Result   Cardiomegaly with possible pulmonary vascular congestion.        No focal consolidation identified.        MACRO:   None        Signed by: Mikayla Bautista 12/14/2024  1:57 AM   Dictation workstation:   [id number]      CT head wo IV contrast    (Results Pending)       LABS:  Abnormal Labs Reviewed   CBC WITH AUTO DIFFERENTIAL - Abnormal; Notable for the following components:       Result Value    WBC 22.4 (*)     RBC 3.82 (*)     Hemoglobin 10.9 (*)     Hematocrit 35.3 (*)     MCHC 30.9 (*)     RDW 16.7 (*)     Neutrophils Absolute 21.32 (*)     Lymphocytes Absolute 0.34 (*)     All other components within normal limits   COMPREHENSIVE METABOLIC PANEL - Abnormal; Notable for the following components:    Glucose 132 (*)     Sodium 132 (*)     Chloride 91 (*)     Anion Gap 24 (*)     Urea Nitrogen 45 (*)     Creatinine 6.59 (*)     eGFR 7 (*)     Calcium 7.9 (*)     Alkaline Phosphatase 233 (*)     Total Protein 8.4 (*)     All other components within normal limits   LACTATE - Abnormal; Notable for the following components:    Lactate 2.5 (*)     All other components within normal limits    Narrative:     Venipuncture immediately after or during the administration of Metamizole may lead to falsely low results. Testing should be performed immediately prior to Metamizole dosing.       All other labs were within normal range or not returned as of this dictation.    EKG:  Personally interpreted by Regla Spaulding, DO  0059: Normal sinus rhythm with ventricular at 70 normal axis and intervals no acute ischemic changes    EMERGENCY DEPARTMENT COURSE/MDM   Patient presents with concern for recurrent sepsis.  She did recently have a new hemodialysis catheter placed.  Previous notes are reviewed showing admission for pneumonia and line sepsis with a vegetation on the line visualized in the superior vena cava on LAURA.  No evidence of an acute endocarditis.  She was discharged on vancomycin to be given after dialysis.  Septic workup is initiated.  Her blood pressure is low here.  It is normally in the low 100s per her report.  Will try fluid resuscitation first.  She is empirically started on  antibiotics for line sepsis.  She is agreeable with this plan of care.      ED Course as of 12/14/24 0407   Sat Dec 14, 2024   0240 White count returned elevated from 2 days ago at 22.4.  Lactate is 2.5.  Patient is biochemically stable. [EF]   0338 Patient's blood pressure did improve after the 30 cc/kg bolus.  Though her systolic is still low her MAP is greater than 65.  Will allow her to run low at this time.  Case discussed with the ICU team and they accept the patient for admission.  She is admitted in fair condition. [EF]      ED Course User Index  [EF] Regla Spaulding, DO         Diagnoses as of 12/14/24 0407   Septic shock (Multi)   ESRD (end stage renal disease) (Multi)       Meds Administered:  Medications   vancomycin (Vancocin) 2 g in sodium chloride 0.9% 500 mL IV (2 g intravenous New Bag 12/14/24 0303)   sodium chloride 0.9 % bolus 1,000 mL (0 mL intravenous Stopped 12/14/24 0149)   piperacillin-tazobactam (Zosyn) 4.5 g in dextrose (iso)  mL (0 g intravenous Stopped 12/14/24 0256)   diphenhydrAMINE (BENADryl) injection 50 mg (50 mg intravenous Given 12/14/24 0137)   fentaNYL PF (Sublimaze) injection 50 mcg (50 mcg intravenous Given 12/14/24 0137)   sodium chloride 0.9 % bolus 2,070 mL (2,070 mL intravenous New Bag 12/14/24 0149)       PROCEDURES   Unless otherwise noted below, none  Procedures      FINAL IMPRESSION      1. Septic shock (Multi)    2. ESRD (end stage renal disease) (Multi)          DISPOSITION    Admit 12/14/2024 03:38:44 AM  As a result of their workup, the patient will require admission to the hospital.  The patient was informed of her diagnosis.  The patient was given the opportunity to ask questions and I answered them. The patient agreed to be admitted to the hospital.    Critical Care time:   Critical Care Time   TOTAL CRITICAL CARE TIME (EXCLUDING SEPARATE BILLABLE PROCEDURES): 35 min  CC time assessed for complex medical decision making, coordination of care between providers  and specialists, discussion with family (if patient unable to contribute), documentation of findings, and interpretation of labwork and imaging.       (Comment: Please note this report has been produced using speech recognition software and may contain errors related to that system including errors in grammar, punctuation, and spelling, as well as words and phrases that may be inappropriate.  If there are any questions or concerns please feel free to contact the dictating provider for clarification.)    Regla Spaulding DO (electronically signed)  Emergency Medicine Physician    History provided by: Patient and Family Member  Limitations to History: Altered Mental Status  External Records Reviewed with Brief Summary: Discharge Summary from 12/12/24 which showed admission for pneumonia and line sepsis, discharged on IV vancomycin with dialysis  Social Determinants of Health which Significantly Impact Care: None identified   EKG Independent Interpretation: EKG interpreted by myself. Please see ED Course for full interpretation.  Independent Result Review and Interpretation: Relevant laboratory and radiographic results were reviewed and independently interpreted by myself.  As necessary, they are commented on in the ED Course.  Chronic conditions affecting the patient's care: As documented above in [facility]  The patient was discussed with the following consultants/services:   ICU APPwho accepted the patient for admission on behalf of attending physician Dr Ibarra  Care Considerations: As documented above in [facility]               Regla Spaulding DO  12/14/24 0405

## 2024-12-14 NOTE — PROGRESS NOTES
Spiritual Care Visit  Spiritual Care Request    Reason for Visit:  Routine Visit: Introduction     Request Received From:       Focus of Care:  Visited With: Patient         Refer to :          Spiritual Care Assessment    Spiritual Assessment:                      Care Provided:       Sense of Community and or Episcopal Affiliation:  Evangelical   Values/Beliefs  Spiritual Requests During Hospitalization: I gave Batsheva a blessing today while she was sleeping.     Addressed Needs/Concerns and/or Willie Through:          Outcome:        Advance Directives:         Spiritual Care Annotation    Annotation:  I gave Brie a blessing while she was sleeping today.  David Peguero

## 2024-12-14 NOTE — CARE PLAN
Problem: Pain - Adult  Goal: Verbalizes/displays adequate comfort level or baseline comfort level  Flowsheets (Taken 12/14/2024 0457)  Verbalizes/displays adequate comfort level or baseline comfort level:   Encourage patient to monitor pain and request assistance   Consider cultural and social influences on pain and pain management   Administer analgesics based on type and severity of pain and evaluate response   Assess pain using appropriate pain scale   Implement non-pharmacological measures as appropriate and evaluate response   Notify Licensed Independent Practitioner if interventions unsuccessful or patient reports new pain     Problem: Safety - Adult  Goal: Free from fall injury  Flowsheets (Taken 12/14/2024 0457)  Free from fall injury:   Instruct family/caregiver on patient safety   Based on caregiver fall risk screen, instruct family/caregiver to ask for assistance with transferring infant if caregiver noted to have fall risk factors     Problem: Discharge Planning  Goal: Discharge to home or other facility with appropriate resources  Flowsheets (Taken 12/14/2024 0457)  Discharge to home or other facility with appropriate resources:   Identify barriers to discharge with patient and caregiver   Identify discharge learning needs (meds, wound care, etc)   Refer to discharge planning if patient needs post-hospital services based on physician order or complex needs related to functional status, cognitive ability or social support system   Arrange for interpreters to assist at discharge as needed   Arrange for needed discharge resources and transportation as appropriate     Problem: Chronic Conditions and Co-morbidities  Goal: Patient's chronic conditions and co-morbidity symptoms are monitored and maintained or improved  Flowsheets (Taken 12/14/2024 0457)  Care Plan - Patient's Chronic Conditions and Co-Morbidity Symptoms are Monitored and Maintained or Improved:   Monitor and assess patient's chronic  conditions and comorbid symptoms for stability, deterioration, or improvement   Collaborate with multidisciplinary team to address chronic and comorbid conditions and prevent exacerbation or deterioration   Update acute care plan with appropriate goals if chronic or comorbid symptoms are exacerbated and prevent overall improvement and discharge     Problem: Skin  Goal: Decreased wound size/increased tissue granulation at next dressing change  Flowsheets (Taken 12/14/2024 0457)  Decreased wound size/increased tissue granulation at next dressing change:   Promote sleep for wound healing   Protective dressings over bony prominences   Utilize specialty bed per algorithm  Goal: Participates in plan/prevention/treatment measures  Flowsheets (Taken 12/14/2024 0457)  Participates in plan/prevention/treatment measures:   Elevate heels   Increase activity/out of bed for meals  Goal: Prevent/manage excess moisture  Flowsheets (Taken 12/14/2024 0457)  Prevent/manage excess moisture:   Cleanse incontinence/protect with barrier cream   Use wicking fabric (obtain order)   Follow provider orders for dressing changes   Moisturize dry skin   Monitor for/manage infection if present  Goal: Prevent/minimize sheer/friction injuries  Flowsheets (Taken 12/14/2024 0457)  Prevent/minimize sheer/friction injuries:   Complete micro-shifts as needed if patient unable. Adjust patient position to relieve pressure points, not a full turn   Increase activity/out of bed for meals   Use pull sheet   HOB 30 degrees or less   Turn/reposition every 2 hours/use positioning/transfer devices   Utilize specialty bed per algorithm  Goal: Promote/optimize nutrition  Flowsheets (Taken 12/14/2024 0457)  Promote/optimize nutrition:   Offer water/supplements/favorite foods   Monitor/record intake including meals  Goal: Promote skin healing  Flowsheets (Taken 12/14/2024 0457)  Promote skin healing:   Assess skin/pad under line(s)/device(s)   Protective dressings  over bony prominences   Turn/reposition every 2 hours/use positioning/transfer devices   Ensure correct size (line/device) and apply per  instructions   Rotate device position/do not position patient on device   The patient's goals for the shift include      The clinical goals for the shift include hemodynamically stable    Over the shift, the patient did not make progress toward the following goals. Barriers to progression include . Recommendations to address these barriers include .

## 2024-12-14 NOTE — CONSULTS
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  Patient cleared blood cultures by 12/4/2024 on daptomycin/ceftaroline.  She had a LAURA which showed no vegetations on known prosthetic aortic and mitral valves.  Dialysis catheter was noted to have thrombus/vegetation on tip of the catheter.  This was removed.  Prior to discharge a tunneled right groin TDC was placed on 12/9/2024.  Patient was discharged from the hospital on vancomycin postdialysis with a planned stop date of 1/16/2025 (6 weeks).  Patient now presents to [facility] emergency room on 12/14 with hypotension and.  She is afebrile.  Her white blood cell count is 22.4.  She was pancultured.  Fluid resuscitated.  And started on broad-spectrum antibiotics and admitted to the ICU.     Past Medical History  She has a past medical history of Aortic valve replaced (2022), CHF (congestive heart failure), Chronic pain, Coronary artery disease, Disease of thyroid gland, ESRD (end stage renal disease) (Multi), H/O mitral valve replacement (2013), Heart disease, History of transfusion, Hypertension, Mitral valve regurgitation, Seizures (Multi), and Stroke (Multi).    Surgical History  She has a past surgical history that includes IR CVC tunneled (09/09/2022); IR CVC tunneled (12/28/2022); US guided percutaneous placement (07/14/2022); Parathyroidectomy; Coronary artery bypass graft; Aortic valve replacement (09/26/2022); Mitral valve replacement (09/26/2022); Mitral valve repair (2013); Tricuspid valvuloplasty (2013); IR CVC  (01/12/2024); IR CVC (05/07/2024); and IR CVC (08/20/2024).     Social History  Denies smoking, alcohol or drug use    Family History  No family exposure to known communicable illnesses  No family history of tuberculosis    Allergies  Kayexalate, Metoclopramide hcl, Prochlorperazine, Zofran [ondansetron hcl], and Ondansetron     Medications  Zosyn D1  Vanco D1  See Epic for remaining medications.    Review of Systems  Detailed review of systems completed.  No significant additional positives beyond what is mentioned above    Physical Exam  Vital signs:  Visit Vitals  /59   Pulse (!) 48   Temp 36.4 °C (97.5 °F)   Resp 10      General: Elderly female resting comfortably no distress.  HEENT:  No scleral icterus or conjunctival suffusion, throat clear, neck supple, no cervical LAD  Lungs:  Clear to auscultation bilaterally  Heart:  RRR, S1, S2 normal, no extra sounds or murmurs.  Abdomen:  Normoactive BS, soft, NT/ND. No palpable organs or masses.  No peritoneal signs.  Extremities:  No erythema/rash    Relevant Lab Results  Results from last 72 hours   Lab Units 12/14/24  0503   WBC AUTO x10*3/uL 16.4*   HEMOGLOBIN g/dL 9.9*   HEMATOCRIT % 32.6*   PLATELETS AUTO x10*3/uL 157     Results from last 72 hours   Lab Units 12/14/24  0503   CREATININE mg/dL 5.92*   EGFR mL/min/1.73m*2 8*     Results from last 72 hours   Lab Units 12/14/24  0503   AST U/L 14   ALT U/L 7   ALK PHOS U/L 184*   BILIRUBIN TOTAL mg/dL 0.5       Cultures  Blood culture (12/2): 4/4 sets-MRSA  Blood culture (12/3): 1 set-MRSA  Blood culture (12/4): 1/2 sets-MRSA  Blood culture (12/5): X 2 sets/NGTD  Blood culture (12/8): X 2 sets-NGTD  Blood culture (12/14): X 3 sets-pending    Imaging  Chest x-ray (12/14): No infiltrates    Impression:  1.  Recent MRSA septicemia secondary to dialysis catheter  -- Hospitalized at [facility] from 12/2/2024 through 12/12/2024.  LAURA was negative for endocarditis of prosthetic aortic and mitral valves.   Patient cleared blood cultures on 12/5/2024.  New right groin tunneled dialysis catheter was placed 12/9/2024.  Patient was discharged on postdialysis vancomycin with a planned stop date of 1/16/2025.  2.  Hypotension/leukocytosis  -- Need to rule out underlying sepsis.  Patient is afebrile.  --Blood cultures x 2 sets pending.  Chest x-ray clear.    Plan:  1.  Continue Zosyn.  Monitor for adverse antibiotic events.  2.  Continue vancomycin 500 mg IV after each dialysis with a planned stop date of 1/16/2025.  3.  Await pending blood cultures obtained on this admission.  4.  Following.      Esa Hi MD  [facility]  [phone]

## 2024-12-15 ENCOUNTER — APPOINTMENT (OUTPATIENT)
Dept: DIALYSIS | Facility: HOSPITAL | Age: 49
End: 2024-12-15
Payer: MEDICARE

## 2024-12-15 VITALS
HEIGHT: 65 IN | OXYGEN SATURATION: 100 % | RESPIRATION RATE: 15 BRPM | TEMPERATURE: 97.2 F | BODY MASS INDEX: 25.64 KG/M2 | DIASTOLIC BLOOD PRESSURE: 76 MMHG | HEART RATE: 71 BPM | WEIGHT: 153.88 LBS | SYSTOLIC BLOOD PRESSURE: 167 MMHG

## 2024-12-15 LAB
ALBUMIN SERPL BCP-MCNC: 3.1 G/DL (ref 3.4–5)
ALP SERPL-CCNC: 151 U/L (ref 33–110)
ALT SERPL W P-5'-P-CCNC: 7 U/L (ref 7–45)
ANION GAP SERPL CALCULATED.3IONS-SCNC: 22 MMOL/L (ref 10–20)
AST SERPL W P-5'-P-CCNC: 12 U/L (ref 9–39)
BACTERIA BLD CULT: NORMAL
BACTERIA BLD CULT: NORMAL
BASOPHILS # BLD AUTO: 0.06 X10*3/UL (ref 0–0.1)
BASOPHILS NFR BLD AUTO: 0.8 %
BILIRUB SERPL-MCNC: 0.4 MG/DL (ref 0–1.2)
BUN SERPL-MCNC: 52 MG/DL (ref 6–23)
CALCIUM SERPL-MCNC: 6.9 MG/DL (ref 8.6–10.3)
CHLORIDE SERPL-SCNC: 97 MMOL/L (ref 98–107)
CO2 SERPL-SCNC: 19 MMOL/L (ref 21–32)
CREAT SERPL-MCNC: 7.5 MG/DL (ref 0.5–1.05)
EGFRCR SERPLBLD CKD-EPI 2021: 6 ML/MIN/1.73M*2
EOSINOPHIL # BLD AUTO: 0.47 X10*3/UL (ref 0–0.7)
EOSINOPHIL NFR BLD AUTO: 6.5 %
ERYTHROCYTE [DISTWIDTH] IN BLOOD BY AUTOMATED COUNT: 17.2 % (ref 11.5–14.5)
GLUCOSE SERPL-MCNC: 82 MG/DL (ref 74–99)
HCT VFR BLD AUTO: 31.8 % (ref 36–46)
HGB BLD-MCNC: 9.7 G/DL (ref 12–16)
IMM GRANULOCYTES # BLD AUTO: 0.03 X10*3/UL (ref 0–0.7)
IMM GRANULOCYTES NFR BLD AUTO: 0.4 % (ref 0–0.9)
LYMPHOCYTES # BLD AUTO: 1.3 X10*3/UL (ref 1.2–4.8)
LYMPHOCYTES NFR BLD AUTO: 18.1 %
MAGNESIUM SERPL-MCNC: 1.97 MG/DL (ref 1.6–2.4)
MCH RBC QN AUTO: 28.3 PG (ref 26–34)
MCHC RBC AUTO-ENTMCNC: 30.5 G/DL (ref 32–36)
MCV RBC AUTO: 93 FL (ref 80–100)
MONOCYTES # BLD AUTO: 0.44 X10*3/UL (ref 0.1–1)
MONOCYTES NFR BLD AUTO: 6.1 %
NEUTROPHILS # BLD AUTO: 4.88 X10*3/UL (ref 1.2–7.7)
NEUTROPHILS NFR BLD AUTO: 68.1 %
NRBC BLD-RTO: 0 /100 WBCS (ref 0–0)
PHOSPHATE SERPL-MCNC: 5.3 MG/DL (ref 2.5–4.9)
PLATELET # BLD AUTO: 147 X10*3/UL (ref 150–450)
POTASSIUM SERPL-SCNC: 5.1 MMOL/L (ref 3.5–5.3)
PROT SERPL-MCNC: 7.2 G/DL (ref 6.4–8.2)
RBC # BLD AUTO: 3.43 X10*6/UL (ref 4–5.2)
SODIUM SERPL-SCNC: 133 MMOL/L (ref 136–145)
VANCOMYCIN SERPL-MCNC: 40.1 UG/ML (ref 5–20)
WBC # BLD AUTO: 7.2 X10*3/UL (ref 4.4–11.3)

## 2024-12-15 PROCEDURE — 90937 HEMODIALYSIS REPEATED EVAL: CPT

## 2024-12-15 PROCEDURE — 2500000004 HC RX 250 GENERAL PHARMACY W/ HCPCS (ALT 636 FOR OP/ED)

## 2024-12-15 PROCEDURE — 84100 ASSAY OF PHOSPHORUS: CPT

## 2024-12-15 PROCEDURE — 83735 ASSAY OF MAGNESIUM: CPT

## 2024-12-15 PROCEDURE — 2500000001 HC RX 250 WO HCPCS SELF ADMINISTERED DRUGS (ALT 637 FOR MEDICARE OP): Performed by: INTERNAL MEDICINE

## 2024-12-15 PROCEDURE — 87040 BLOOD CULTURE FOR BACTERIA: CPT | Mod: WESLAB

## 2024-12-15 PROCEDURE — 84075 ASSAY ALKALINE PHOSPHATASE: CPT

## 2024-12-15 PROCEDURE — 36415 COLL VENOUS BLD VENIPUNCTURE: CPT

## 2024-12-15 PROCEDURE — 80202 ASSAY OF VANCOMYCIN: CPT

## 2024-12-15 PROCEDURE — 85025 COMPLETE CBC W/AUTO DIFF WBC: CPT

## 2024-12-15 PROCEDURE — 5A1D70Z PERFORMANCE OF URINARY FILTRATION, INTERMITTENT, LESS THAN 6 HOURS PER DAY: ICD-10-PCS | Performed by: INTERNAL MEDICINE

## 2024-12-15 PROCEDURE — 2060000001 HC INTERMEDIATE ICU ROOM DAILY

## 2024-12-15 PROCEDURE — 87040 BLOOD CULTURE FOR BACTERIA: CPT | Mod: WESLAB | Performed by: SURGERY

## 2024-12-15 PROCEDURE — 99233 SBSQ HOSP IP/OBS HIGH 50: CPT | Performed by: SURGERY

## 2024-12-15 PROCEDURE — 2500000004 HC RX 250 GENERAL PHARMACY W/ HCPCS (ALT 636 FOR OP/ED): Performed by: INTERNAL MEDICINE

## 2024-12-15 PROCEDURE — 2500000001 HC RX 250 WO HCPCS SELF ADMINISTERED DRUGS (ALT 637 FOR MEDICARE OP)

## 2024-12-15 RX ORDER — DIPHENHYDRAMINE HYDROCHLORIDE 50 MG/ML
50 INJECTION INTRAMUSCULAR; INTRAVENOUS ONCE
Status: COMPLETED | OUTPATIENT
Start: 2024-12-15 | End: 2024-12-15

## 2024-12-15 RX ORDER — HEPARIN SODIUM 5000 [USP'U]/ML
5000 INJECTION, SOLUTION INTRAVENOUS; SUBCUTANEOUS EVERY 12 HOURS
Status: DISCONTINUED | OUTPATIENT
Start: 2024-12-15 | End: 2024-12-16 | Stop reason: HOSPADM

## 2024-12-15 RX ORDER — AMOXICILLIN 250 MG
1 CAPSULE ORAL NIGHTLY
Status: DISCONTINUED | OUTPATIENT
Start: 2024-12-15 | End: 2024-12-16 | Stop reason: HOSPADM

## 2024-12-15 RX ADMIN — DIPHENHYDRAMINE HYDROCHLORIDE 50 MG: 50 INJECTION, SOLUTION INTRAMUSCULAR; INTRAVENOUS at 12:47

## 2024-12-15 RX ADMIN — OXYCODONE AND ACETAMINOPHEN 1 TABLET: 5; 325 TABLET ORAL at 00:38

## 2024-12-15 RX ADMIN — HEPARIN SODIUM 1000 UNITS: 1000 INJECTION, SOLUTION INTRAVENOUS; SUBCUTANEOUS at 17:22

## 2024-12-15 RX ADMIN — PIPERACILLIN SODIUM AND TAZOBACTAM SODIUM 2.25 G: 2; .25 INJECTION, SOLUTION INTRAVENOUS at 13:09

## 2024-12-15 RX ADMIN — OXYCODONE AND ACETAMINOPHEN 1 TABLET: 5; 325 TABLET ORAL at 11:55

## 2024-12-15 RX ADMIN — CLONIDINE HYDROCHLORIDE 0.1 MG: 0.1 TABLET ORAL at 20:49

## 2024-12-15 RX ADMIN — DOCUSATE SODIUM 100 MG: 100 CAPSULE, LIQUID FILLED ORAL at 09:07

## 2024-12-15 RX ADMIN — PIPERACILLIN SODIUM AND TAZOBACTAM SODIUM 2.25 G: 2; .25 INJECTION, SOLUTION INTRAVENOUS at 04:08

## 2024-12-15 RX ADMIN — ASPIRIN 81 MG: 81 TABLET, COATED ORAL at 09:06

## 2024-12-15 RX ADMIN — HYDROXYZINE HYDROCHLORIDE 25 MG: 25 TABLET ORAL at 20:48

## 2024-12-15 RX ADMIN — HEPARIN SODIUM 1000 UNITS: 1000 INJECTION, SOLUTION INTRAVENOUS; SUBCUTANEOUS at 17:21

## 2024-12-15 RX ADMIN — OXYCODONE AND ACETAMINOPHEN 1 TABLET: 5; 325 TABLET ORAL at 20:47

## 2024-12-15 RX ADMIN — PIPERACILLIN SODIUM AND TAZOBACTAM SODIUM 2.25 G: 2; .25 INJECTION, SOLUTION INTRAVENOUS at 21:51

## 2024-12-15 RX ADMIN — HYDROXYZINE HYDROCHLORIDE 25 MG: 25 TABLET ORAL at 05:01

## 2024-12-15 RX ADMIN — METOPROLOL TARTRATE 50 MG: 50 TABLET, FILM COATED ORAL at 20:47

## 2024-12-15 RX ADMIN — CALCITRIOL CAPSULES 0.25 MCG 0.5 MCG: 0.25 CAPSULE ORAL at 09:06

## 2024-12-15 ASSESSMENT — PAIN - FUNCTIONAL ASSESSMENT
PAIN_FUNCTIONAL_ASSESSMENT: 0-10
PAIN_FUNCTIONAL_ASSESSMENT: CPOT (CRITICAL CARE PAIN OBSERVATION TOOL)
PAIN_FUNCTIONAL_ASSESSMENT: 0-10
PAIN_FUNCTIONAL_ASSESSMENT: 0-10

## 2024-12-15 ASSESSMENT — COGNITIVE AND FUNCTIONAL STATUS - GENERAL
CLIMB 3 TO 5 STEPS WITH RAILING: A LOT
DAILY ACTIVITIY SCORE: 19
MOVING FROM LYING ON BACK TO SITTING ON SIDE OF FLAT BED WITH BEDRAILS: A LITTLE
MOVING TO AND FROM BED TO CHAIR: A LITTLE
DRESSING REGULAR UPPER BODY CLOTHING: A LITTLE
DRESSING REGULAR LOWER BODY CLOTHING: A LITTLE
PERSONAL GROOMING: A LITTLE
WALKING IN HOSPITAL ROOM: A LOT
STANDING UP FROM CHAIR USING ARMS: A LITTLE
TURNING FROM BACK TO SIDE WHILE IN FLAT BAD: A LITTLE
MOBILITY SCORE: 16
HELP NEEDED FOR BATHING: A LITTLE
TOILETING: A LITTLE

## 2024-12-15 ASSESSMENT — PAIN DESCRIPTION - DESCRIPTORS
DESCRIPTORS: ACHING

## 2024-12-15 ASSESSMENT — PAIN SCALES - GENERAL
PAINLEVEL_OUTOF10: 5 - MODERATE PAIN
PAINLEVEL_OUTOF10: 0 - NO PAIN
PAINLEVEL_OUTOF10: 10 - WORST POSSIBLE PAIN
PAINLEVEL_OUTOF10: 5 - MODERATE PAIN

## 2024-12-15 ASSESSMENT — PAIN SCALES - PAIN ASSESSMENT IN ADVANCED DEMENTIA (PAINAD): TOTALSCORE: MEDICATION (SEE MAR)

## 2024-12-15 ASSESSMENT — PAIN DESCRIPTION - LOCATION: LOCATION: GENERALIZED

## 2024-12-15 NOTE — NURSING NOTE
Patient arrived from ICU. Very pleasant and cooperative. Got to bed from  with standby assist. Call light within reach.

## 2024-12-15 NOTE — CARE PLAN
Problem: Pain - Adult  Goal: Verbalizes/displays adequate comfort level or baseline comfort level  Outcome: Progressing  Flowsheets (Taken 12/14/2024 1957)  Verbalizes/displays adequate comfort level or baseline comfort level:   Encourage patient to monitor pain and request assistance   Assess pain using appropriate pain scale   Administer analgesics based on type and severity of pain and evaluate response   Implement non-pharmacological measures as appropriate and evaluate response   Consider cultural and social influences on pain and pain management   Notify Licensed Independent Practitioner if interventions unsuccessful or patient reports new pain     Problem: Safety - Adult  Goal: Free from fall injury  Outcome: Progressing  Flowsheets (Taken 12/14/2024 1957)  Free from fall injury:   Instruct family/caregiver on patient safety   Based on caregiver fall risk screen, instruct family/caregiver to ask for assistance with transferring infant if caregiver noted to have fall risk factors     Problem: Discharge Planning  Goal: Discharge to home or other facility with appropriate resources  Outcome: Progressing  Flowsheets (Taken 12/14/2024 1957)  Discharge to home or other facility with appropriate resources:   Identify barriers to discharge with patient and caregiver   Arrange for needed discharge resources and transportation as appropriate   Identify discharge learning needs (meds, wound care, etc)   Arrange for interpreters to assist at discharge as needed   Refer to discharge planning if patient needs post-hospital services based on physician order or complex needs related to functional status, cognitive ability or social support system     Problem: Chronic Conditions and Co-morbidities  Goal: Patient's chronic conditions and co-morbidity symptoms are monitored and maintained or improved  Outcome: Progressing  Flowsheets (Taken 12/14/2024 1957)  Care Plan - Patient's Chronic Conditions and Co-Morbidity Symptoms  are Monitored and Maintained or Improved:   Monitor and assess patient's chronic conditions and comorbid symptoms for stability, deterioration, or improvement   Collaborate with multidisciplinary team to address chronic and comorbid conditions and prevent exacerbation or deterioration   Update acute care plan with appropriate goals if chronic or comorbid symptoms are exacerbated and prevent overall improvement and discharge     Problem: Fall/Injury  Goal: Not fall by end of shift  Outcome: Progressing  Goal: Be free from injury by end of the shift  Outcome: Progressing  Goal: Verbalize understanding of personal risk factors for fall in the hospital  Outcome: Progressing  Goal: Verbalize understanding of risk factor reduction measures to prevent injury from fall in the home  Outcome: Progressing  Goal: Use assistive devices by end of the shift  Outcome: Progressing  Goal: Pace activities to prevent fatigue by end of the shift  Outcome: Progressing     Problem: Skin  Goal: Decreased wound size/increased tissue granulation at next dressing change  Outcome: Progressing  Flowsheets (Taken 12/14/2024 1957)  Decreased wound size/increased tissue granulation at next dressing change:   Utilize specialty bed per algorithm   Protective dressings over bony prominences   Promote sleep for wound healing  Goal: Participates in plan/prevention/treatment measures  Outcome: Progressing  Flowsheets (Taken 12/14/2024 1957)  Participates in plan/prevention/treatment measures:   Discuss with provider PT/OT consult   Increase activity/out of bed for meals   Elevate heels  Goal: Prevent/manage excess moisture  Outcome: Progressing  Flowsheets (Taken 12/14/2024 1957)  Prevent/manage excess moisture:   Use wicking fabric (obtain order)   Cleanse incontinence/protect with barrier cream   Moisturize dry skin   Follow provider orders for dressing changes   Monitor for/manage infection if present  Goal: Prevent/minimize sheer/friction  injuries  Outcome: Progressing  Flowsheets (Taken 12/14/2024 1957)  Prevent/minimize sheer/friction injuries:   Complete micro-shifts as needed if patient unable. Adjust patient position to relieve pressure points, not a full turn   HOB 30 degrees or less   Turn/reposition every 2 hours/use positioning/transfer devices   Use pull sheet   Utilize specialty bed per algorithm   Increase activity/out of bed for meals  Goal: Promote/optimize nutrition  Outcome: Progressing  Flowsheets (Taken 12/14/2024 1957)  Promote/optimize nutrition:   Offer water/supplements/favorite foods   Monitor/record intake including meals  Goal: Promote skin healing  Outcome: Progressing  Flowsheets (Taken 12/14/2024 1957)  Promote skin healing:   Assess skin/pad under line(s)/device(s)   Ensure correct size (line/device) and apply per  instructions   Rotate device position/do not position patient on device   Protective dressings over bony prominences   Turn/reposition every 2 hours/use positioning/transfer devices   The patient's goals for the shift include      The clinical goals for the shift include hemodynamically stable    Over the shift, the patient did not make progress toward the following goals. Barriers to progression include . Recommendations to address these barriers include .

## 2024-12-15 NOTE — PROGRESS NOTES
Medical Intensive Care - Daily Progress Note   Subjective    Batsheva Page is a 49 y.o. year old female patient admitted on 12/14/2024 with following ICU needs: Septic shock presumed line sepsis    HPI: Batsheva Page is a 49 y.o. year old female patient with Past Medical History of End-stage renal disease on hemodialysis, recurrent MSSA/MRSA bacteremia, endocarditis s/p aortic valve replacement and mitral valve replacement, hypertension, HLD, Seizures, anemia, hyponatremia, presented to the emergency department today with lightheadedness and generalized malaise which she reports is how she feels when she is getting septic.  She was recently admitted to the ICU at [facility] for MRSA bacteremia secondary to infected dialysis line placement.  Her original dialysis line was in her right subclavian, was moved to the right femoral.  Her new dialysis line was placed on 12/10 by IR.  She was discharged with instructions to have vancomycin with dialysis upon discharge for 6 weeks with an end date of 1/16/2025.  The patient was discharged from [facility] on 12/12.  She had dialysis on Wednesday and Thursday, she did not go on Friday which is her normal day.       Upon arrival to the emergency department patient was found to be hypotensive with a systolic blood pressure in the 70s.  She is afebrile at that time.  No significant increase in her leukocytosis from time of discharge.  Her WBCs today are 22.4, hemoglobin stable, lactate 2.5, sodium 132, creatinine 6.59, BUN 45.  She received approximately 3.5 L of fluids in the emergency department as well as vancomycin and Zosyn.    Interval History:  No issues overnight    Assessment and Plan      Problem/Injury Exam & Plan Resolved   Neuro Hx blurred vision, lightheadedness and seizure  Anxiety Patient was reported to be lethargic on presentation. This resolved on hospital day 1.   Patient restarted on home pain medication regimen  Recommended outpatient counseling  "for anxiety    Pulmonary Crackles Patient is on HD.  She missed her last session.  She will have tablo today    CV HTN  HLD  Hx of AV and MV replacements for endocarditis Home meds    GI No issues Diet as tolerated    /FEN ESRD on HD (MWF)  Hyponatremia  Mild Hyperkalemia Tablo today    ID Septic Shock Patient now with stable vital signs.    Leukocytosis has resolved.    Continue Zosyn at HD doses and vancomycin, pharmacy to dose    Endo  Blood sugars within acceptable limits    Heme Anemia Chronic, though this is slightly lower than usual.  Will monitor    MSK Chronic pruritus  Hydroxyzine. Patient requesting benedryl, stating that she gets itching with all antibiotics.  I did not order at this time.    Prophylaxis  Subcu heparin started.  GI prophylaxis not indicated.    Dispo  Patient will likely be able to leave ICU after she completes her tablo therapy    Radiology Summary of dick imaging results from the last 24 hours  No new images   Lines Line Date Line Date                     Meds    Scheduled medications  aspirin, 81 mg, oral, Daily  atorvastatin, 40 mg, oral, Nightly  calcitriol, 0.5 mcg, oral, Daily  [Held by provider] cloNIDine, 0.1 mg, oral, q8h KIMBERLY  docusate sodium, 100 mg, oral, BID  ergocalciferol, 1,250 mcg, oral, Every Sunday  heparin, 1,000 Units, intra-catheter, After Dialysis  heparin, 1,000 Units, intra-catheter, After Dialysis  levETIRAcetam, 750 mg, oral, Nightly  [Held by provider] metoprolol tartrate, 50 mg, oral, BID  piperacillin-tazobactam, 2.25 g, intravenous, q8h  vancomycin, 500 mg, intravenous, After Dialysis      Continuous medications     PRN medications  PRN medications: acetaminophen **OR** acetaminophen **OR** acetaminophen, acetaminophen, hydrOXYzine HCL, methocarbamol, oxyCODONE-acetaminophen, vancomycin     Objective    Blood pressure 126/72, pulse 57, temperature 36.4 °C (97.5 °F), temperature source Oral, resp. rate 16, height 1.651 m (5' 5\"), weight 66.9 kg (147 lb 7.8 " oz), SpO2 97%.     Physical Exam  Constitutional:       General: She is not in acute distress.     Appearance: Normal appearance. She is normal weight. She is not ill-appearing, toxic-appearing or diaphoretic.   HENT:      Head: Normocephalic and atraumatic.   Eyes:      General: No scleral icterus.  Cardiovascular:      Rate and Rhythm: Normal rate and regular rhythm.      Heart sounds: Murmur heard.      Comments: Patient is status post valve replacement x 2  DP pulses are faint.  Feet are warm.  Pulmonary:      Effort: Pulmonary effort is normal. No respiratory distress.      Breath sounds: Rales present. No wheezing or rhonchi.      Comments: Crackles present at bilateral bases  Abdominal:      General: Abdomen is flat. There is no distension.      Palpations: Abdomen is soft.      Tenderness: There is no abdominal tenderness.   Musculoskeletal:      Right lower leg: No edema.      Left lower leg: No edema.   Skin:     General: Skin is warm and dry.   Neurological:      Mental Status: She is alert.   Psychiatric:         Behavior: Behavior normal.            Intake/Output Summary (Last 24 hours) at 12/15/2024 0825  Last data filed at 12/15/2024 0500  Gross per 24 hour   Intake 520 ml   Output --   Net 520 ml     Labs:   Results from last 72 hours   Lab Units 12/15/24  0427 12/14/24  0503 12/14/24  0125   SODIUM mmol/L 133* 133* 132*   POTASSIUM mmol/L 5.1 4.2 4.5   CHLORIDE mmol/L 97* 98 91*   CO2 mmol/L 19* 21 22   BUN mg/dL 52* 41* 45*   CREATININE mg/dL 7.50* 5.92* 6.59*   GLUCOSE mg/dL 82 116* 132*   CALCIUM mg/dL 6.9* 6.8* 7.9*   ANION GAP mmol/L 22* 18 24*   EGFR mL/min/1.73m*2 6* 8* 7*   PHOSPHORUS mg/dL 5.3* 4.2  --       Results from last 72 hours   Lab Units 12/15/24  0427 12/14/24  0503 12/14/24  0125   WBC AUTO x10*3/uL 7.2 16.4* 22.4*   HEMOGLOBIN g/dL 9.7* 9.9* 10.9*   HEMATOCRIT % 31.8* 32.6* 35.3*   PLATELETS AUTO x10*3/uL 147* 157 201   NEUTROS PCT AUTO % 68.1  --  95.0   LYMPHS PCT AUTO % 18.1   --  1.5   MONOS PCT AUTO % 6.1  --  2.0   EOS PCT AUTO % 6.5  --  0.2                 Micro/ID:     Lab Results   Component Value Date    BLOODCULT Loaded on Instrument - Culture in progress 12/14/2024    BLOODCULT Loaded on Instrument - Culture in progress 12/14/2024         ICU Check List       ICU Liberation: Intervention:   Assess, Prevent, Manage Pain CPOT  Patient can be assessed on 1 to 10 scale   Both SAT and SBT [] SAT  [] SBT 30-60 min [] Extubate to NC  [] Extubate to NIV  [] Discuss Trach   Choice of analgesia and sedation RASS: N/a  none N/a   Delirium: Assess, prevent and manage CAM  N/a   Early Mobility and Exercise  [] PT /OT consult   Family Engagement and Empowerment []Family updated []SW consult     FEN  Fluids: None  Electrolytes: K 5.1  Nutrition: Diet ordered  Prophylaxis:  DVT ppx: start sqh  GI ppx: not indicated  Bowel care: start pericolace  Hardware:         Hemodialysis Cath 12/10/24 Double lumen Right Tunneled catheter Femoral (Active)   Placement Date/Time: 12/10/24 1516   Hand Hygiene Completed: Yes  Site Prep: Chlorhexidine   Site Prep Agent has Completely Dried Before Insertion: Yes  All 5 Sterile Barriers Used (Gloves, Gown, Cap, Mask, Large Sterile Drape): Yes  Local Anesthetic:...   Number of days: 4       Social:  Code: Full Code    HPOA:   Disposition: Patient can be discharged from ICU to regular nursing floor once she has done with Katerine Ibarra MD   12/15/24 at 8:25 AM     Disclaimer: Documentation completed with the information available at the time of input. The times in the chart may not be reflective of actual patient care times, interventions, or procedures. Documentation occurs after the physical care of the patient. This critically ill patient continues to be at-risk for deterioration / failure due to the above  mentioned dysfunctional unstable organ systems.  I have reviewed, evaluated, identified and managed all critical medical problems.  Assessment,  impressions and plans are reflected in the note above as well as the orders.  Critical care time is spent at bedside includes review of diagnostic tests, labs, and radiographs, serial assessments and management of hemodynamics, respiratory status, ventilation and coordination of care.  Teaching and any separately billable procedures are not included in the time calculation.

## 2024-12-15 NOTE — CARE PLAN
The patient's goals for the shift include      The clinical goals for the shift include hemodynamically stable      Problem: Pain - Adult  Goal: Verbalizes/displays adequate comfort level or baseline comfort level  Outcome: Progressing     Problem: Safety - Adult  Goal: Free from fall injury  Outcome: Progressing     Problem: Discharge Planning  Goal: Discharge to home or other facility with appropriate resources  Outcome: Progressing     Problem: Chronic Conditions and Co-morbidities  Goal: Patient's chronic conditions and co-morbidity symptoms are monitored and maintained or improved  Outcome: Progressing     Problem: Fall/Injury  Goal: Not fall by end of shift  Outcome: Progressing  Goal: Be free from injury by end of the shift  Outcome: Progressing  Goal: Verbalize understanding of personal risk factors for fall in the hospital  Outcome: Progressing  Goal: Verbalize understanding of risk factor reduction measures to prevent injury from fall in the home  Outcome: Progressing  Goal: Use assistive devices by end of the shift  Outcome: Progressing  Goal: Pace activities to prevent fatigue by end of the shift  Outcome: Progressing     Problem: Skin  Goal: Decreased wound size/increased tissue granulation at next dressing change  Outcome: Progressing  Flowsheets (Taken 12/15/2024 0955)  Decreased wound size/increased tissue granulation at next dressing change:   Promote sleep for wound healing   Protective dressings over bony prominences   Utilize specialty bed per algorithm  Goal: Participates in plan/prevention/treatment measures  Outcome: Progressing  Flowsheets (Taken 12/15/2024 0955)  Participates in plan/prevention/treatment measures:   Discuss with provider PT/OT consult   Elevate heels   Increase activity/out of bed for meals  Goal: Prevent/manage excess moisture  Outcome: Progressing  Flowsheets (Taken 12/15/2024 0955)  Prevent/manage excess moisture:   Cleanse incontinence/protect with barrier cream    Monitor for/manage infection if present   Follow provider orders for dressing changes  Goal: Prevent/minimize sheer/friction injuries  Outcome: Progressing  Flowsheets (Taken 12/15/2024 0955)  Prevent/minimize sheer/friction injuries:   Complete micro-shifts as needed if patient unable. Adjust patient position to relieve pressure points, not a full turn   Use pull sheet   Increase activity/out of bed for meals   Utilize specialty bed per algorithm  Goal: Promote/optimize nutrition  Outcome: Progressing  Flowsheets (Taken 12/15/2024 0955)  Promote/optimize nutrition:   Offer water/supplements/favorite foods   Monitor/record intake including meals  Goal: Promote skin healing  Outcome: Progressing  Flowsheets (Taken 12/15/2024 0955)  Promote skin healing:   Turn/reposition every 2 hours/use positioning/transfer devices   Protective dressings over bony prominences   Ensure correct size (line/device) and apply per  instructions

## 2024-12-15 NOTE — PROGRESS NOTES
Batsheva Page is a 49 y.o. female on day 1 of admission presenting with Septic shock (Multi).    Patient lives with a friend in a single level house with four steps to enter. She uses a cane as needed. She is independent with ADLs. She does not drive, friend or daughter provide transportation. She goes to [facility] for dialysis. PCP is Dr Zhou Pedersen.  ADOD 12/16/2024  PT/OT cancelled eval yesterday d/t HR in 40s and pending EKG.   blood cultures are pending  Vanco stop date is 12/16/2024  Plan is to discharge home with no needs. Patient states if she needs anything, her friend and daughter are available to assist.     Nerissa Goins RN

## 2024-12-15 NOTE — NURSING NOTE
HD line seeping bloody drainage. Dr. Buckner made aware. Pressure dressing applied. Night Nurse made aware and assessed site.

## 2024-12-15 NOTE — CONSULTS
Reason For Consult  ESRD on HD    History Of Present Illness  Batsheva Page is a 49 y.o. female with a past medical history of end-stage renal disease on hemodialysis Monday Wednesday Friday who was recently admitted and discharged from [facility] with bacteremia secondary to infected dialysis catheter, had her tunneled dialysis catheter removed and then subsequently right femoral tunneled dialysis catheter placed with clear cultures and was discharged to receive vancomycin with dialysis however patient became hypotensive and febrile with elevated white blood cell count and is now readmitted.  She reports she was having diarrhea which improved as well as numbness and weakness in her legs which is also improved otherwise denies any symptoms other than itching she says she gets with antibiotics.  Says her last dialysis session was on Thursday, she said she received dialysis on Wednesday and Thursday.  We are consulted for management of end-stage renal disease.     Past Medical History  She has a past medical history of Aortic valve replaced (2022), CHF (congestive heart failure), Chronic pain, Coronary artery disease, Disease of thyroid gland, ESRD (end stage renal disease) (Multi), H/O mitral valve replacement (2013), Heart disease, History of transfusion, Hypertension, Mitral valve regurgitation, Seizures (Multi), and Stroke (Multi).    Surgical History  She has a past surgical history that includes IR CVC tunneled (09/09/2022); IR CVC tunneled (12/28/2022); US guided percutaneous placement (07/14/2022); Parathyroidectomy; Coronary artery bypass graft; Aortic valve replacement (09/26/2022); Mitral valve replacement (09/26/2022); Mitral valve repair (2013); Tricuspid valvuloplasty (2013); IR CVC (01/12/2024); IR CVC (05/07/2024); and IR CVC (08/20/2024).     Social History  She reports that she has never smoked. She has never used smokeless tobacco. She reports that she does not drink alcohol and does not  "use drugs.    Family History  Family History   Problem Relation Name Age of Onset    No Known Problems Mother      No Known Problems Father          Allergies  Kayexalate, Metoclopramide hcl, Prochlorperazine, Zofran [ondansetron hcl], and Ondansetron    Review of Systems  10 point review of systems was obtained and is negative other than what is indicated above in the HPI     Physical Exam  General: Awake, alert, no acute distress  Head/ears/nose/throat:  Normocephalic, atraumatic  Neck:  No jugular venous distention, neck supple  Respiratory:  Diminished breath sounds left lung field, normal respiratory effort  Cardiovascular:  S1 and S2, soft systolic murmur appreciated, no rubs  Gastrointestinal:  Soft, positive bowel sounds, no rebound or guarding  Extremities:  No edema, cyanosis  Musculoskeletal:  No injury or deformity noted  Neurologic:  Alert, responsive, cooperative with exam  Skin:  No ulcers noted, dry         I&O 24HR    Intake/Output Summary (Last 24 hours) at 12/14/2024 2321  Last data filed at 12/14/2024 2100  Gross per 24 hour   Intake 3720 ml   Output --   Net 3720 ml       Vitals 24HR  Heart Rate:  [48-82]   Temp:  [36.2 °C (97.2 °F)-36.5 °C (97.7 °F)]   Resp:  [7-20]   BP: ()/(54-92)   Height:  [165.1 cm (5' 5\")]   Weight:  [66.9 kg (147 lb 7.8 oz)-69 kg (152 lb 1.6 oz)]   SpO2:  [93 %-100 %]       Relevant Results  Results for orders placed or performed during the hospital encounter of 12/14/24 (from the past 24 hours)   CBC and Auto Differential   Result Value Ref Range    WBC 22.4 (H) 4.4 - 11.3 x10*3/uL    nRBC 0.0 0.0 - 0.0 /100 WBCs    RBC 3.82 (L) 4.00 - 5.20 x10*6/uL    Hemoglobin 10.9 (L) 12.0 - 16.0 g/dL    Hematocrit 35.3 (L) 36.0 - 46.0 %    MCV 92 80 - 100 fL    MCH 28.5 26.0 - 34.0 pg    MCHC 30.9 (L) 32.0 - 36.0 g/dL    RDW 16.7 (H) 11.5 - 14.5 %    Platelets 201 150 - 450 x10*3/uL    Neutrophils % 95.0 40.0 - 80.0 %    Immature Granulocytes %, Automated 0.9 0.0 - 0.9 %    " Lymphocytes % 1.5 13.0 - 44.0 %    Monocytes % 2.0 2.0 - 10.0 %    Eosinophils % 0.2 0.0 - 6.0 %    Basophils % 0.4 0.0 - 2.0 %    Neutrophils Absolute 21.32 (H) 1.20 - 7.70 x10*3/uL    Immature Granulocytes Absolute, Automated 0.20 0.00 - 0.70 x10*3/uL    Lymphocytes Absolute 0.34 (L) 1.20 - 4.80 x10*3/uL    Monocytes Absolute 0.44 0.10 - 1.00 x10*3/uL    Eosinophils Absolute 0.05 0.00 - 0.70 x10*3/uL    Basophils Absolute 0.08 0.00 - 0.10 x10*3/uL   Comprehensive Metabolic Panel   Result Value Ref Range    Glucose 132 (H) 74 - 99 mg/dL    Sodium 132 (L) 136 - 145 mmol/L    Potassium 4.5 3.5 - 5.3 mmol/L    Chloride 91 (L) 98 - 107 mmol/L    Bicarbonate 22 21 - 32 mmol/L    Anion Gap 24 (H) 10 - 20 mmol/L    Urea Nitrogen 45 (H) 6 - 23 mg/dL    Creatinine 6.59 (H) 0.50 - 1.05 mg/dL    eGFR 7 (L) >60 mL/min/1.73m*2    Calcium 7.9 (L) 8.6 - 10.3 mg/dL    Albumin 3.5 3.4 - 5.0 g/dL    Alkaline Phosphatase 233 (H) 33 - 110 U/L    Total Protein 8.4 (H) 6.4 - 8.2 g/dL    AST 19 9 - 39 U/L    Bilirubin, Total 0.6 0.0 - 1.2 mg/dL    ALT 10 7 - 45 U/L   Lactate   Result Value Ref Range    Lactate 2.5 (H) 0.4 - 2.0 mmol/L   Blood Culture    Specimen: Peripheral Venipuncture; Blood culture   Result Value Ref Range    Blood Culture Loaded on Instrument - Culture in progress    Blood Culture    Specimen: Peripheral Venipuncture; Blood culture   Result Value Ref Range    Blood Culture Loaded on Instrument - Culture in progress    Lactate   Result Value Ref Range    Lactate 1.2 0.4 - 2.0 mmol/L   CBC   Result Value Ref Range    WBC 16.4 (H) 4.4 - 11.3 x10*3/uL    nRBC 0.0 0.0 - 0.0 /100 WBCs    RBC 3.50 (L) 4.00 - 5.20 x10*6/uL    Hemoglobin 9.9 (L) 12.0 - 16.0 g/dL    Hematocrit 32.6 (L) 36.0 - 46.0 %    MCV 93 80 - 100 fL    MCH 28.3 26.0 - 34.0 pg    MCHC 30.4 (L) 32.0 - 36.0 g/dL    RDW 16.9 (H) 11.5 - 14.5 %    Platelets 157 150 - 450 x10*3/uL   Comprehensive metabolic panel   Result Value Ref Range    Glucose 116 (H) 74 -  99 mg/dL    Sodium 133 (L) 136 - 145 mmol/L    Potassium 4.2 3.5 - 5.3 mmol/L    Chloride 98 98 - 107 mmol/L    Bicarbonate 21 21 - 32 mmol/L    Anion Gap 18 10 - 20 mmol/L    Urea Nitrogen 41 (H) 6 - 23 mg/dL    Creatinine 5.92 (H) 0.50 - 1.05 mg/dL    eGFR 8 (L) >60 mL/min/1.73m*2    Calcium 6.8 (L) 8.6 - 10.3 mg/dL    Albumin 3.1 (L) 3.4 - 5.0 g/dL    Alkaline Phosphatase 184 (H) 33 - 110 U/L    Total Protein 6.6 6.4 - 8.2 g/dL    AST 14 9 - 39 U/L    Bilirubin, Total 0.5 0.0 - 1.2 mg/dL    ALT 7 7 - 45 U/L   Magnesium   Result Value Ref Range    Magnesium 1.91 1.60 - 2.40 mg/dL   Phosphorus   Result Value Ref Range    Phosphorus 4.2 2.5 - 4.9 mg/dL          Assessment/Plan   ESRD on HD MWF  Recent Bacteremia  Sepsis  Hypocalcemia    Plan: Will plan for tablo dialysis tomorrow in the ICU and eventually get her back on her regular Monday Wednesday Friday schedule.  Monitor culture results, infectious disease on board, renally dose antibiotics for dialysis.  Renal diet.  Thank you for your consultation.    Vu Pedersen MD

## 2024-12-16 ENCOUNTER — APPOINTMENT (OUTPATIENT)
Dept: RADIOLOGY | Facility: HOSPITAL | Age: 49
DRG: 314 | End: 2024-12-16
Payer: MEDICARE

## 2024-12-16 ENCOUNTER — PHARMACY VISIT (OUTPATIENT)
Dept: PHARMACY | Facility: CLINIC | Age: 49
End: 2024-12-16
Payer: COMMERCIAL

## 2024-12-16 VITALS
HEART RATE: 58 BPM | HEIGHT: 65 IN | SYSTOLIC BLOOD PRESSURE: 158 MMHG | TEMPERATURE: 98.2 F | WEIGHT: 149.03 LBS | DIASTOLIC BLOOD PRESSURE: 79 MMHG | OXYGEN SATURATION: 96 % | RESPIRATION RATE: 17 BRPM | BODY MASS INDEX: 24.83 KG/M2

## 2024-12-16 PROBLEM — T82.838A: Status: ACTIVE | Noted: 2024-12-16

## 2024-12-16 PROBLEM — Z99.2 ESRD (END STAGE RENAL DISEASE) ON DIALYSIS (MULTI): Status: ACTIVE | Noted: 2023-11-09

## 2024-12-16 LAB
ALBUMIN SERPL BCP-MCNC: 3.3 G/DL (ref 3.4–5)
ALP SERPL-CCNC: 174 U/L (ref 33–110)
ALT SERPL W P-5'-P-CCNC: 8 U/L (ref 7–45)
ANION GAP SERPL CALCULATED.3IONS-SCNC: 16 MMOL/L (ref 10–20)
AST SERPL W P-5'-P-CCNC: 16 U/L (ref 9–39)
ATRIAL RATE: 70 BPM
BASOPHILS # BLD AUTO: 0.04 X10*3/UL (ref 0–0.1)
BASOPHILS NFR BLD AUTO: 0.7 %
BILIRUB SERPL-MCNC: 0.5 MG/DL (ref 0–1.2)
BUN SERPL-MCNC: 20 MG/DL (ref 6–23)
CALCIUM SERPL-MCNC: 7.6 MG/DL (ref 8.6–10.3)
CHLORIDE SERPL-SCNC: 95 MMOL/L (ref 98–107)
CO2 SERPL-SCNC: 25 MMOL/L (ref 21–32)
CREAT SERPL-MCNC: 4.22 MG/DL (ref 0.5–1.05)
EGFRCR SERPLBLD CKD-EPI 2021: 12 ML/MIN/1.73M*2
EOSINOPHIL # BLD AUTO: 0.29 X10*3/UL (ref 0–0.7)
EOSINOPHIL NFR BLD AUTO: 5.3 %
ERYTHROCYTE [DISTWIDTH] IN BLOOD BY AUTOMATED COUNT: 16.8 % (ref 11.5–14.5)
GLUCOSE SERPL-MCNC: 87 MG/DL (ref 74–99)
HCT VFR BLD AUTO: 33.3 % (ref 36–46)
HGB BLD-MCNC: 10.2 G/DL (ref 12–16)
IMM GRANULOCYTES # BLD AUTO: 0.02 X10*3/UL (ref 0–0.7)
IMM GRANULOCYTES NFR BLD AUTO: 0.4 % (ref 0–0.9)
LYMPHOCYTES # BLD AUTO: 0.78 X10*3/UL (ref 1.2–4.8)
LYMPHOCYTES NFR BLD AUTO: 14.2 %
MAGNESIUM SERPL-MCNC: 1.97 MG/DL (ref 1.6–2.4)
MCH RBC QN AUTO: 27.9 PG (ref 26–34)
MCHC RBC AUTO-ENTMCNC: 30.6 G/DL (ref 32–36)
MCV RBC AUTO: 91 FL (ref 80–100)
MONOCYTES # BLD AUTO: 0.4 X10*3/UL (ref 0.1–1)
MONOCYTES NFR BLD AUTO: 7.3 %
NEUTROPHILS # BLD AUTO: 3.96 X10*3/UL (ref 1.2–7.7)
NEUTROPHILS NFR BLD AUTO: 72.1 %
NRBC BLD-RTO: 0 /100 WBCS (ref 0–0)
P AXIS: 61 DEGREES
P OFFSET: 204 MS
P ONSET: 157 MS
PHOSPHATE SERPL-MCNC: 4.2 MG/DL (ref 2.5–4.9)
PLATELET # BLD AUTO: 156 X10*3/UL (ref 150–450)
POTASSIUM SERPL-SCNC: 3.9 MMOL/L (ref 3.5–5.3)
PR INTERVAL: 130 MS
PROT SERPL-MCNC: 8 G/DL (ref 6.4–8.2)
Q ONSET: 222 MS
QRS COUNT: 11 BEATS
QRS DURATION: 72 MS
QT INTERVAL: 410 MS
QTC CALCULATION(BAZETT): 442 MS
QTC FREDERICIA: 431 MS
R AXIS: 12 DEGREES
RBC # BLD AUTO: 3.65 X10*6/UL (ref 4–5.2)
SODIUM SERPL-SCNC: 132 MMOL/L (ref 136–145)
T AXIS: 97 DEGREES
T OFFSET: 427 MS
VANCOMYCIN SERPL-MCNC: 30 UG/ML (ref 5–20)
VENTRICULAR RATE: 70 BPM
WBC # BLD AUTO: 5.5 X10*3/UL (ref 4.4–11.3)

## 2024-12-16 PROCEDURE — 83735 ASSAY OF MAGNESIUM: CPT | Performed by: INTERNAL MEDICINE

## 2024-12-16 PROCEDURE — 2500000001 HC RX 250 WO HCPCS SELF ADMINISTERED DRUGS (ALT 637 FOR MEDICARE OP): Performed by: INTERNAL MEDICINE

## 2024-12-16 PROCEDURE — 85025 COMPLETE CBC W/AUTO DIFF WBC: CPT | Performed by: INTERNAL MEDICINE

## 2024-12-16 PROCEDURE — 36415 COLL VENOUS BLD VENIPUNCTURE: CPT | Performed by: INTERNAL MEDICINE

## 2024-12-16 PROCEDURE — 6350000001 HC RX 635 EPOETIN >10,000 UNITS: Performed by: INTERNAL MEDICINE

## 2024-12-16 PROCEDURE — 80202 ASSAY OF VANCOMYCIN: CPT | Performed by: INTERNAL MEDICINE

## 2024-12-16 PROCEDURE — 97165 OT EVAL LOW COMPLEX 30 MIN: CPT | Mod: GO

## 2024-12-16 PROCEDURE — 80053 COMPREHEN METABOLIC PANEL: CPT | Performed by: INTERNAL MEDICINE

## 2024-12-16 PROCEDURE — 2500000004 HC RX 250 GENERAL PHARMACY W/ HCPCS (ALT 636 FOR OP/ED): Performed by: INTERNAL MEDICINE

## 2024-12-16 PROCEDURE — RXMED WILLOW AMBULATORY MEDICATION CHARGE

## 2024-12-16 PROCEDURE — 2500000004 HC RX 250 GENERAL PHARMACY W/ HCPCS (ALT 636 FOR OP/ED): Performed by: STUDENT IN AN ORGANIZED HEALTH CARE EDUCATION/TRAINING PROGRAM

## 2024-12-16 PROCEDURE — 36575 REPAIR TUNNELED CV CATH: CPT

## 2024-12-16 PROCEDURE — 36575 REPAIR TUNNELED CV CATH: CPT | Performed by: RADIOLOGY

## 2024-12-16 PROCEDURE — 97161 PT EVAL LOW COMPLEX 20 MIN: CPT | Mod: GP | Performed by: PHYSICAL THERAPIST

## 2024-12-16 PROCEDURE — 99239 HOSP IP/OBS DSCHRG MGMT >30: CPT | Performed by: STUDENT IN AN ORGANIZED HEALTH CARE EDUCATION/TRAINING PROGRAM

## 2024-12-16 PROCEDURE — 84100 ASSAY OF PHOSPHORUS: CPT | Performed by: INTERNAL MEDICINE

## 2024-12-16 RX ORDER — HYDROXYZINE HYDROCHLORIDE 25 MG/1
25 TABLET, FILM COATED ORAL EVERY 6 HOURS PRN
Qty: 28 TABLET | Refills: 0 | Status: SHIPPED | OUTPATIENT
Start: 2024-12-16 | End: 2024-12-23

## 2024-12-16 RX ORDER — HYDROMORPHONE HYDROCHLORIDE 0.2 MG/ML
0.2 INJECTION INTRAMUSCULAR; INTRAVENOUS; SUBCUTANEOUS EVERY 4 HOURS PRN
Status: DISCONTINUED | OUTPATIENT
Start: 2024-12-16 | End: 2024-12-16 | Stop reason: HOSPADM

## 2024-12-16 RX ORDER — METHOCARBAMOL 750 MG/1
750 TABLET, FILM COATED ORAL EVERY 6 HOURS PRN
Qty: 28 TABLET | Refills: 0 | Status: SHIPPED | OUTPATIENT
Start: 2024-12-16 | End: 2024-12-23

## 2024-12-16 RX ADMIN — OXYCODONE AND ACETAMINOPHEN 1 TABLET: 5; 325 TABLET ORAL at 16:07

## 2024-12-16 RX ADMIN — CALCITRIOL CAPSULES 0.25 MCG 0.5 MCG: 0.25 CAPSULE ORAL at 08:27

## 2024-12-16 RX ADMIN — ASPIRIN 81 MG: 81 TABLET, COATED ORAL at 08:27

## 2024-12-16 RX ADMIN — PIPERACILLIN SODIUM AND TAZOBACTAM SODIUM 2.25 G: 2; .25 INJECTION, SOLUTION INTRAVENOUS at 05:00

## 2024-12-16 RX ADMIN — CLONIDINE HYDROCHLORIDE 0.1 MG: 0.1 TABLET ORAL at 05:00

## 2024-12-16 RX ADMIN — OXYCODONE AND ACETAMINOPHEN 1 TABLET: 5; 325 TABLET ORAL at 03:02

## 2024-12-16 RX ADMIN — METHOCARBAMOL TABLETS 750 MG: 500 TABLET, COATED ORAL at 07:37

## 2024-12-16 RX ADMIN — HYDROMORPHONE HYDROCHLORIDE 0.2 MG: 0.2 INJECTION, SOLUTION INTRAMUSCULAR; INTRAVENOUS; SUBCUTANEOUS at 12:28

## 2024-12-16 RX ADMIN — CLONIDINE HYDROCHLORIDE 0.1 MG: 0.1 TABLET ORAL at 14:13

## 2024-12-16 RX ADMIN — EPOETIN ALFA-EPBX 6000 UNITS: 20000 INJECTION, SOLUTION INTRAVENOUS; SUBCUTANEOUS at 14:14

## 2024-12-16 RX ADMIN — HYDROMORPHONE HYDROCHLORIDE 0.2 MG: 0.2 INJECTION, SOLUTION INTRAMUSCULAR; INTRAVENOUS; SUBCUTANEOUS at 08:27

## 2024-12-16 RX ADMIN — HYDROXYZINE HYDROCHLORIDE 25 MG: 25 TABLET ORAL at 04:38

## 2024-12-16 ASSESSMENT — PAIN - FUNCTIONAL ASSESSMENT
PAIN_FUNCTIONAL_ASSESSMENT: 0-10
PAIN_FUNCTIONAL_ASSESSMENT: FLACC (FACE, LEGS, ACTIVITY, CRY, CONSOLABILITY)
PAIN_FUNCTIONAL_ASSESSMENT: 0-10
PAIN_FUNCTIONAL_ASSESSMENT: FLACC (FACE, LEGS, ACTIVITY, CRY, CONSOLABILITY)

## 2024-12-16 ASSESSMENT — COGNITIVE AND FUNCTIONAL STATUS - GENERAL
DRESSING REGULAR LOWER BODY CLOTHING: A LITTLE
STANDING UP FROM CHAIR USING ARMS: A LITTLE
TURNING FROM BACK TO SIDE WHILE IN FLAT BAD: A LITTLE
HELP NEEDED FOR BATHING: A LITTLE
TOILETING: A LITTLE
MOVING TO AND FROM BED TO CHAIR: A LITTLE
DRESSING REGULAR LOWER BODY CLOTHING: A LITTLE
MOVING FROM LYING ON BACK TO SITTING ON SIDE OF FLAT BED WITH BEDRAILS: A LITTLE
CLIMB 3 TO 5 STEPS WITH RAILING: A LITTLE
MOBILITY SCORE: 20
WALKING IN HOSPITAL ROOM: A LITTLE
WALKING IN HOSPITAL ROOM: A LITTLE
DAILY ACTIVITIY SCORE: 21
CLIMB 3 TO 5 STEPS WITH RAILING: A LITTLE
STANDING UP FROM CHAIR USING ARMS: A LITTLE
DRESSING REGULAR UPPER BODY CLOTHING: A LITTLE
MOVING TO AND FROM BED TO CHAIR: A LITTLE
DAILY ACTIVITIY SCORE: 20
DRESSING REGULAR UPPER BODY CLOTHING: A LITTLE
MOBILITY SCORE: 18
HELP NEEDED FOR BATHING: A LITTLE

## 2024-12-16 ASSESSMENT — PAIN SCALES - GENERAL
PAINLEVEL_OUTOF10: 10 - WORST POSSIBLE PAIN
PAINLEVEL_OUTOF10: 2
PAINLEVEL_OUTOF10: 10 - WORST POSSIBLE PAIN
PAINLEVEL_OUTOF10: 0 - NO PAIN
PAINLEVEL_OUTOF10: 10 - WORST POSSIBLE PAIN
PAINLEVEL_OUTOF10: 4
PAINLEVEL_OUTOF10: 0 - NO PAIN
PAINLEVEL_OUTOF10: 2
PAINLEVEL_OUTOF10: 0 - NO PAIN
PAINLEVEL_OUTOF10: 0 - NO PAIN

## 2024-12-16 ASSESSMENT — PAIN DESCRIPTION - ORIENTATION
ORIENTATION: RIGHT

## 2024-12-16 ASSESSMENT — PAIN DESCRIPTION - LOCATION
LOCATION: GROIN

## 2024-12-16 ASSESSMENT — ACTIVITIES OF DAILY LIVING (ADL)
BATHING_ASSISTANCE: MINIMAL
ADL_ASSISTANCE: INDEPENDENT
ADL_ASSISTANCE: INDEPENDENT

## 2024-12-16 ASSESSMENT — PAIN DESCRIPTION - DESCRIPTORS
DESCRIPTORS: JABBING
DESCRIPTORS: ACHING
DESCRIPTORS: ACHING

## 2024-12-16 NOTE — DISCHARGE SUMMARY
Discharge Diagnosis  Septic shock (Multi)    Issues Requiring Follow-Up  Bacteremia     Discharge Meds     Medication List      START taking these medications     hydrOXYzine HCL 25 mg tablet; Commonly known as: Atarax; Take 1 tablet   (25 mg) by mouth every 6 hours if needed for itching (1st line) for up to   7 days.   methocarbamol 750 mg tablet; Commonly known as: Robaxin; Take 1 tablet   (750 mg) by mouth every 6 hours if needed for muscle spasms for up to 7   days.     CONTINUE taking these medications     acetaminophen 325 mg tablet; Commonly known as: Tylenol; Take 2 tablets   (650 mg) by mouth every 6 hours as needed for mild pain (1 - 3).   aspirin 81 mg EC tablet; Take 1 tablet (81 mg) by mouth once daily.   atorvastatin 40 mg tablet; Commonly known as: Lipitor   calcitriol 0.25 mcg capsule; Commonly known as: Rocaltrol   cloNIDine 0.1 mg tablet; Commonly known as: Catapres; Take 1 tablet (0.1   mg) by mouth every 8 hours.   epoetin brandy-epbx 20,000 unit/mL solution; Commonly known as: Retacrit;   Inject 6,440 Units under the skin 3 times a week. Do not start before   January 17, 2024.   ergocalciferol 1.25 MG (16084 UT) capsule; Commonly known as: Vitamin   D-2   levETIRAcetam 500 mg tablet; Commonly known as: Keppra   metoprolol tartrate 25 mg tablet; Commonly known as: Lopressor; Take 1   tablet (25 mg) by mouth 2 times a day.   oxyCODONE-acetaminophen 5-325 mg tablet; Commonly known as: Percocet;   Take 1 tablet by mouth every 6 hours as needed for moderate pain (4 - 6).   pregabalin 25 mg capsule; Commonly known as: Lyrica; Take 2 capsules (50   mg) by mouth 2 times a day.   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 She was recently admitted to the ICU at [facility] for MRSA bacteremia secondary to infected dialysis line placement.  Her original dialysis line was in her right subclavian, was moved to the right femoral.  Her new dialysis line was placed on 12/10 by IR.  She was discharged with instructions to have vancomycin with dialysis upon discharge for 6 weeks with an end date of 1/16/2025.  The patient was discharged from [facility] on 12/12.  She had dialysis on Wednesday and Thursday, she did not go on Friday which is her normal day.       Upon arrival to the emergency department patient was found to be hypotensive with a systolic blood pressure in the 70s.  She is afebrile at that time.  No significant increase in her leukocytosis from time of discharge.  Her WBCs today are 22.4, hemoglobin stable, lactate 2.5, sodium 132, creatinine 6.59, BUN 45.  She received approximately 3.5 L of fluids in the emergency department as well as vancomycin and Zosyn.    Admitted to the ICU for septic shock, likely due to MRSA bacteremia. ID and nephrology consulted. Started on pressor support. Blood cultures negative. Vancomycin continued per ID. Blood pressure stabilized and patient was weaned off pressor support. Hospital course complicated by oozing at the HD site. Evaluated by IR who did not recommend catheter removal and replacement - dressings adjusted and bleeding stopped. Per ID patient is to continue IV vancomycin 750mg after every dialysis through 1/16/25 with repeat blood cultures following final IV vancomycin dose which was arranged with patient's outpatient HD center. Cleared for discharge by ID and nephrology.     Pertinent Physical Exam At Time of Discharge  Physical Exam  Constitutional:       General: She is not in acute  distress.     Appearance: She is not toxic-appearing.   HENT:      Head: Normocephalic.      Mouth/Throat:      Pharynx: Oropharynx is clear.   Eyes:      General: No scleral icterus.  Cardiovascular:      Rate and Rhythm: Normal rate.   Pulmonary:      Effort: No respiratory distress.      Breath sounds: No wheezing.   Abdominal:      General: There is no distension.   Musculoskeletal:      Right lower leg: No edema.      Left lower leg: No edema.   Skin:     Comments: Right fem HD catheter with surrounding bruising    Neurological:      Mental Status: She is alert and oriented to person, place, and time.         Outpatient Follow-Up  No future appointments.    Time spent on discharge: 35 minutes     Keeley Murdock MD

## 2024-12-16 NOTE — PROGRESS NOTES
Batsheva Page is a 49 y.o. female on day 1 of admission presenting with Septic shock (Multi).      Subjective   Patient was undergoing tablo dialysis tolerating well with no complaints.       Objective          Vitals 24HR  Heart Rate:  [51-71]   Temp:  [36.2 °C (97.2 °F)-36.5 °C (97.7 °F)]   Resp:  [7-19]   BP: ()/(64-92)   Weight:  [66.9 kg (147 lb 7.8 oz)-70.4 kg (155 lb 2.6 oz)]   SpO2:  [93 %-100 %]     Intake/Output last 3 Shifts:    Intake/Output Summary (Last 24 hours) at 12/15/2024 1951  Last data filed at 12/15/2024 1700  Gross per 24 hour   Intake 1120 ml   Output 2000 ml   Net -880 ml       Physical Exam  General: Awake, alert, no acute distress  Respiratory:  Clear breath sounds, normal respiratory effort  Cardiovascular: systolic murmur appreciated, no rubs  Gastrointestinal:  Soft, positive bowel sounds, no rebound or guarding  Extremities:  No edema, cyanosis    Relevant Results  Results for orders placed or performed during the hospital encounter of 12/14/24 (from the past 24 hours)   Comprehensive metabolic panel   Result Value Ref Range    Glucose 82 74 - 99 mg/dL    Sodium 133 (L) 136 - 145 mmol/L    Potassium 5.1 3.5 - 5.3 mmol/L    Chloride 97 (L) 98 - 107 mmol/L    Bicarbonate 19 (L) 21 - 32 mmol/L    Anion Gap 22 (H) 10 - 20 mmol/L    Urea Nitrogen 52 (H) 6 - 23 mg/dL    Creatinine 7.50 (H) 0.50 - 1.05 mg/dL    eGFR 6 (L) >60 mL/min/1.73m*2    Calcium 6.9 (L) 8.6 - 10.3 mg/dL    Albumin 3.1 (L) 3.4 - 5.0 g/dL    Alkaline Phosphatase 151 (H) 33 - 110 U/L    Total Protein 7.2 6.4 - 8.2 g/dL    AST 12 9 - 39 U/L    Bilirubin, Total 0.4 0.0 - 1.2 mg/dL    ALT 7 7 - 45 U/L   Magnesium   Result Value Ref Range    Magnesium 1.97 1.60 - 2.40 mg/dL   Phosphorus   Result Value Ref Range    Phosphorus 5.3 (H) 2.5 - 4.9 mg/dL   Vancomycin   Result Value Ref Range    Vancomycin 40.1 (H) 5.0 - 20.0 ug/mL   CBC and Auto Differential   Result Value Ref Range    WBC 7.2 4.4 - 11.3 x10*3/uL     nRBC 0.0 0.0 - 0.0 /100 WBCs    RBC 3.43 (L) 4.00 - 5.20 x10*6/uL    Hemoglobin 9.7 (L) 12.0 - 16.0 g/dL    Hematocrit 31.8 (L) 36.0 - 46.0 %    MCV 93 80 - 100 fL    MCH 28.3 26.0 - 34.0 pg    MCHC 30.5 (L) 32.0 - 36.0 g/dL    RDW 17.2 (H) 11.5 - 14.5 %    Platelets 147 (L) 150 - 450 x10*3/uL    Neutrophils % 68.1 40.0 - 80.0 %    Immature Granulocytes %, Automated 0.4 0.0 - 0.9 %    Lymphocytes % 18.1 13.0 - 44.0 %    Monocytes % 6.1 2.0 - 10.0 %    Eosinophils % 6.5 0.0 - 6.0 %    Basophils % 0.8 0.0 - 2.0 %    Neutrophils Absolute 4.88 1.20 - 7.70 x10*3/uL    Immature Granulocytes Absolute, Automated 0.03 0.00 - 0.70 x10*3/uL    Lymphocytes Absolute 1.30 1.20 - 4.80 x10*3/uL    Monocytes Absolute 0.44 0.10 - 1.00 x10*3/uL    Eosinophils Absolute 0.47 0.00 - 0.70 x10*3/uL    Basophils Absolute 0.06 0.00 - 0.10 x10*3/uL            Assessment/Plan   ESRD on HD MWF  Recent Bacteremia, so far blood cultures on this admission negative at 1 day  Sepsis  Hypocalcemia     Plan: Having TABLO dialysis in the ICU today, next dialysis to be Tuesday and then eventually get her back on her regular Monday Wednesday Friday schedule.  Monitor culture results, infectious disease on board, renally dose antibiotics for dialysis.  Renal diet.      Vu Pedersen MD

## 2024-12-16 NOTE — NURSING NOTE
Assumed care of pt, pt resting quietly in bed, blood oozing at dialysis site rt groin , call light within reach

## 2024-12-16 NOTE — NURSING NOTE
"Vitals obtained at this time. Patient still does not want to take Keppra dose. Sleeping upon arrival to room and appears to be in no distress. Asked patient how often she skips her Keppra dose and patient responded, \"Once in awhile.\" Will reach out to hospitalist to notify.   "

## 2024-12-16 NOTE — PROGRESS NOTES
Occupational Therapy    Evaluation    Patient Name: Batsheva Page  MRN: [id number]  Department: [facility]  Room: [facility]-A  Today's Date: 12/16/2024  Time Calculation  Start Time: 1331  Stop Time: 1341  Time Calculation (min): 10 min    Assessment  IP OT Assessment  OT Assessment: OT order received, chart reviewed, evaluation completed. Pt demonstrated mild ADL deficits due to dialysis catheter in groin. Pt would benefit from acute OT services to facilitate return to [facility].  Prognosis: Good  Barriers to Discharge Home: Physical needs  Physical Needs: Intermittent ADL assistance needed  Evaluation/Treatment Tolerance: Patient tolerated treatment well  Medical Staff Made Aware: Yes  End of Session Communication: Bedside nurse  End of Session Patient Position: Bed, 2 rail up, Alarm off, not on at start of session (RN okay for no alarm, safety plan discussed with pt and call to get up. Pt and RN agreeable.)  Plan:  Treatment Interventions: ADL retraining, Functional transfer training, Endurance training, Patient/family training, Equipment evaluation/education, Compensatory technique education  OT Frequency: One time follow up visit  OT Discharge Recommendations: No OT needed after discharge  OT Recommended Transfer Status: Minimal assist  OT - OK to Discharge: Yes    Subjective   Current Problem:  1. Septic shock (Multi)        2. ESRD (end stage renal disease) (Multi)        3. Paroxysmal atrial fibrillation (Multi)        4. Anxiety  methocarbamol (Robaxin) 750 mg tablet    hydrOXYzine HCL (Atarax) 25 mg tablet        General:  General  Reason for Referral: activities of daily living, This 49 year old was admitted for septic shock on 12/14/24 then developed bleeding at HD catheter site. Of note, she was admitted at [facility] 12/3-12/12/24 for recurrent MRSA bacteremia 2° to infected HD catheter & metabolic encephalopathy. Pt s/p R groin/femroal HD catheter placement on 12/10/24.  Past Medical History Relevant to  Rehab: CAD s/p CABG, chronic pain, CHF, CVA, ESRD on HD (MWF), endocarditis s/p AVR & MVR (2022), HTN, parathyroidectomy, seizures  Family/Caregiver Present: No  Co-Treatment: PT (co-eval for maximizing therapeutic outcomes in a pt with relatively new femoral catheter placement who had desired to leave AMA)  Prior to Session Communication: Bedside nurse  Patient Position Received: Bed, 2 rail up, Alarm off, not on at start of session  General Comment: Cleared by RN for participation. Pt agreed to session and was fully engaged throughout.  Precautions:  Hearing/Visual Limitations: Hearing WFL. Glasses donned.  Medical Precautions: Fall precautions  Precautions Comment: Telemetry. R femoral HD catheter, BUE IV (hep-locked).    Vital Sign (Past 2hrs)                Pain:  Pain Assessment  Pain Assessment: 0-10  0-10 (Numeric) Pain Score: 0 - No pain (at rest. FLACC 1/10 acute pain at R groin catheter site during ambulation.)  Pain Interventions: Repositioned  Response to Interventions: Content/relaxed    Objective   Cognition:  Overall Cognitive Status: Within Functional Limits  Orientation Level: Oriented X4     Home Living:  Type of Home: House  Lives With: Friends  Home Adaptive Equipment: Sock aid, Reacher, Cane  Home Layout: One level, Laundry main level  Entrance Stairs-Number of Steps: 4 stairs with unilatreal rail  Bathroom Shower/Tub: Tub/shower unit  Bathroom Equipment: Grab bars in shower  Home Living Comments: Friend and dtr both worked (different hours) and could provide some physical assist, if needed.   Prior Function:  Level of [place]: Independent with ADLs and functional transfers, Independent with homemaking with ambulation  Receives Help From: Family  ADL Assistance: Independent  Homemaking Assistance: Independent  Ambulatory Assistance:  (Denied any falls within the past 6 months)  Transfers: Independent  Gait: Independent (cane PRN)  Prior Function Comments: (-) driving, friend or dtr  "provided transportation  IADL History:     ADL:  Eating Assistance: Independent  Grooming Assistance: Independent  Bathing Assistance: Minimal  Bathing Deficit:  (Assist due to groin catheter limiting flexion)  UE Dressing Assistance: Minimal  UE Dressing Deficit: Thread RUE, Thread LUE, Pull around back  LE Dressing Assistance: Moderate  LE Dressing Deficit: Don/doff R sock, Don/doff L sock (due to new dialyais catheter in groin)  Toileting Assistance with Device: Stand by  Toileting Deficit:  (projected, declined to perform)  Activity Tolerance:  Endurance: Endurance does not limit participation in activity (pain at R groin catheter site limited ambulation distance)  Bed Mobility/Transfers: Bed Mobility  Bed Mobility: Yes  Bed Mobility 1  Bed Mobility 1: Supine to sitting, Sitting to supine  Level of Assistance 1: Modified independent (HOB elevated >/=40°, toward L side)  Bed Mobility Comments 1: use of bed rail    Transfers  Transfer: Yes  Transfer 1  Technique 1: Sit to stand, Stand to sit  Transfer Level of Assistance 1: Close supervision  Trials/Comments 1: at bedside, close S for safety      Functional Mobility:  Functional Mobility  Functional Mobility Performed: Yes  Functional Mobility 1  Surface 1: Level tile  Device 1: No device  Assistance 1: Close supervision  Comments 1: pt performed functional mobility extended household distance with close S, wide stance and lateral sway due to dialysis catheter \"pinching\" in groin. Returned to bed in room.    Sensation:  Light Touch: No apparent deficits  Sensation Comment: Not tested; pt denied paresthesias now and at baseline  Strength:  Strength Comments: B UEs WFL    Coordination:  Movements are Fluid and Coordinated: Yes   Hand Function:  Hand Function  Gross Grasp: Functional  Coordination: Functional  Extremities: RUE   RUE : Within Functional Limits and LUE   LUE: Within Functional Limits    Outcome Measures: [facility] Daily Activity  Putting on and taking off " regular lower body clothing: A little  Bathing (including washing, rinsing, drying): A little  Putting on and taking off regular upper body clothing: A little  Toileting, which includes using toilet, bedpan or urinal: None  Taking care of personal grooming such as brushing teeth: None  Eating Meals: None  Daily Activity - Total Score: 21      Education Documentation  ADL Training, taught by Darcie Williamson OT at 12/16/2024  2:41 PM.  Learner: Patient  Readiness: Acceptance  Method: Explanation  Response: Verbalizes Understanding  Comment: Pt edu on OT POC    Education Comments  Pt educated on occupational therapy plan of care, safety/fall precautions, call light use.         Goals:   Encounter Problems       Encounter Problems (Active)       OT Goals       ADLs       Start:  12/16/24    Expected End:  12/31/24       Pt will complete ADL tasks with Mod I, using AE as needed, in order to complete self-care tasks.              OT Goals       Functional mobility       Start:  12/16/24    Expected End:  12/31/24       Pt will perform functional mobility household distance independently

## 2024-12-16 NOTE — PROGRESS NOTES
Physical Therapy                 Therapy Communication Note    Patient Name: Batsheva Page  MRN: [id number]  Department: [address]  Room: [address] [place] [address]  Today's Date: 12/16/2024     Discipline: Physical Therapy    PT Missed Visit: Yes     Missed Visit Reason: Cancel    Missed Time: Cancelled at 09:40.    Comment: Order received and chart reviewed. Hold eval per bedside RN due to blood oozing from R femoral HD catheter site. Interventional radiology practitioner currently at bedside.

## 2024-12-16 NOTE — NURSING NOTE
Dr ibanez notified about pts bleeding at dialysis site under rt groin jailene, she will be up to see pt, ir notified as well

## 2024-12-16 NOTE — NURSING NOTE
Called into patients room for bleeding around HD catheter site. Dressings removed, more than 1 noted. Area around catheter site with clotted blood, see media pic for reference. New dressing placed and secure chat sent to Dr. Walls to make aware.

## 2024-12-16 NOTE — PROGRESS NOTES
Batsheva Page is a 49 y.o. female on day 2 of admission presenting with Septic shock (Multi).      Subjective   Patient is being seen and examined in her bed.  She is up at the edge of the bed, she looks much better.  Blood pressure has improved up to 140s to 160s systolic.  She had TABLO HD yesterday so she can go back on IHD.  She is eager to go home.  Next hemodialysis will be on Wednesday.    Was seen by ID and plan is to continue vancomycin 750 mg after each 13/16 in addition to vancomycin catheter lock hemodialysis       Objective          Vitals 24HR  Heart Rate:  [54-71]   Temp:  [36.2 °C (97.2 °F)-36.9 °C (98.5 °F)]   Resp:  [11-17]   BP: (142-168)/(71-87)   Weight:  [67.6 kg (149 lb 0.5 oz)-69.8 kg (153 lb 14.1 oz)]   SpO2:  [94 %-100 %]         Intake/Output last 3 Shifts:    Intake/Output Summary (Last 24 hours) at 12/16/2024 1618  Last data filed at 12/16/2024 1400  Gross per 24 hour   Intake 660 ml   Output 2000 ml   Net -1340 ml       Physical Exam  Constitutional:       Appearance: Normal appearance.   HENT:      Mouth/Throat:      Mouth: Mucous membranes are moist.   Cardiovascular:      Rate and Rhythm: Normal rate and regular rhythm.   Pulmonary:      Effort: Pulmonary effort is normal. No respiratory distress.      Breath sounds: No wheezing or rales.   Abdominal:      General: There is no distension.      Tenderness: There is no abdominal tenderness. There is no guarding.   Musculoskeletal:      Right lower leg: No edema.      Left lower leg: No edema.      Comments: Right femoral tunneled hemodialysis catheter   Skin:     General: Skin is warm and dry.      Coloration: Skin is not pale.   Neurological:      General: No focal deficit present.      Mental Status: She is alert and oriented to person, place, and time.   Psychiatric:         Mood and Affect: Mood normal.         Thought Content: Thought content normal.             Relevant Results    Results for orders placed or performed  during the hospital encounter of 12/14/24 (from the past 24 hours)   Comprehensive metabolic panel   Result Value Ref Range    Glucose 87 74 - 99 mg/dL    Sodium 132 (L) 136 - 145 mmol/L    Potassium 3.9 3.5 - 5.3 mmol/L    Chloride 95 (L) 98 - 107 mmol/L    Bicarbonate 25 21 - 32 mmol/L    Anion Gap 16 10 - 20 mmol/L    Urea Nitrogen 20 6 - 23 mg/dL    Creatinine 4.22 (H) 0.50 - 1.05 mg/dL    eGFR 12 (L) >60 mL/min/1.73m*2    Calcium 7.6 (L) 8.6 - 10.3 mg/dL    Albumin 3.3 (L) 3.4 - 5.0 g/dL    Alkaline Phosphatase 174 (H) 33 - 110 U/L    Total Protein 8.0 6.4 - 8.2 g/dL    AST 16 9 - 39 U/L    Bilirubin, Total 0.5 0.0 - 1.2 mg/dL    ALT 8 7 - 45 U/L   Magnesium   Result Value Ref Range    Magnesium 1.97 1.60 - 2.40 mg/dL   Phosphorus   Result Value Ref Range    Phosphorus 4.2 2.5 - 4.9 mg/dL   Vancomycin   Result Value Ref Range    Vancomycin 30.0 (H) 5.0 - 20.0 ug/mL   CBC and Auto Differential   Result Value Ref Range    WBC 5.5 4.4 - 11.3 x10*3/uL    nRBC 0.0 0.0 - 0.0 /100 WBCs    RBC 3.65 (L) 4.00 - 5.20 x10*6/uL    Hemoglobin 10.2 (L) 12.0 - 16.0 g/dL    Hematocrit 33.3 (L) 36.0 - 46.0 %    MCV 91 80 - 100 fL    MCH 27.9 26.0 - 34.0 pg    MCHC 30.6 (L) 32.0 - 36.0 g/dL    RDW 16.8 (H) 11.5 - 14.5 %    Platelets 156 150 - 450 x10*3/uL    Neutrophils % 72.1 40.0 - 80.0 %    Immature Granulocytes %, Automated 0.4 0.0 - 0.9 %    Lymphocytes % 14.2 13.0 - 44.0 %    Monocytes % 7.3 2.0 - 10.0 %    Eosinophils % 5.3 0.0 - 6.0 %    Basophils % 0.7 0.0 - 2.0 %    Neutrophils Absolute 3.96 1.20 - 7.70 x10*3/uL    Immature Granulocytes Absolute, Automated 0.02 0.00 - 0.70 x10*3/uL    Lymphocytes Absolute 0.78 (L) 1.20 - 4.80 x10*3/uL    Monocytes Absolute 0.40 0.10 - 1.00 x10*3/uL    Eosinophils Absolute 0.29 0.00 - 0.70 x10*3/uL    Basophils Absolute 0.04 0.00 - 0.10 x10*3/uL       Assessment/Plan   ESRD on hemodialysis on MWF schedule at [facility] [place].  Recurrent bacteremia, MRSA related to hemodialysis catheter  infection, patient currently on vancomycin per ID recommendation.  Hypertension: Controlled with ultrafiltration and medications.  Patient on Toprol and clonidine  Anemia and chronic kidney disease: Hemoglobin at target 9.5 to 11 g/dL on Epogen    Would like to go home today.  She had full hemodialysis yesterday and potassium is normal.  Patient can be [facility]  Next hemodialysis will be on Wednesday.  Continue vancomycin 750 mg IV after each hemodialysis till January 16 per ID recommendation.  She also will get vancomycin catheter lock 5 mL after each hemodialysis.  Patient will get surveillance blood culture after 1 week.    Vancomycin order was called to her [facility] [place] dialysis unit.             Linda Robison MD

## 2024-12-16 NOTE — PROGRESS NOTES
"BRIEF  ID  CHART  REVIEW  NOTE      Chart reviewed  Patient seen and examined  Full note to follow  Will discontinue Zosyn and anticipate continuing vancomycin 500 mg with every dialysis through 1/16/2025  Should continue vancomycin \"dwells\" after each dialysis session    Adama Soto MD  [facility]  Office:  [phone]   "

## 2024-12-16 NOTE — PROGRESS NOTES
"INFECTIOUS DISEASES PROGRESS NOTE    Consulted / following patient for:  Recent MRSA catheter-associated bacteremia  Leukocytosis    Subjective   Interval History:   Feels \"back to normal\" and very anxious to be discharged   Complains of some weeping from right femoral dialysis catheter site    Objective   PHYSICAL EXAMINATION  Vital signs:  Visit Vitals  /80 (BP Location: Left arm, Patient Position: Lying)   Pulse 58   Temp 36.9 °C (98.4 °F) (Oral)   Resp 17      General:  No acute distress  HEENT:  No scleral icterus or conjunctival suffusion, oral mucosa moist  Lungs:  Clear to auscultation  Heart:  S1, S2 normal, no pathologic murmur appreciated  Abdomen:  Soft, nontender. No palpable organs or masses  Extremities:  No cords, phlebitis, cellulitis.  Right femoral dialysis catheter, no suppuration  Neurologic:  Alert.  Grossly non-focal.  meningismus  Skin:  No peripheral signs of endocarditis     Relevant Results  WBC:  63950-->53284 --> 7200 --> 5500    Results from last 72 hours   Lab Units 12/16/24  0510   CREATININE mg/dL 4.22*   ANION GAP mmol/L 16   EGFR mL/min/1.73m*2 12*     Results from last 72 hours   Lab Units 12/16/24  0510   AST U/L 16   ALT U/L 8   ALK PHOS U/L 174*   BILIRUBIN TOTAL mg/dL 0.5     Microbiology:  Blood culture (12/2): 4/4 sets-MRSA  Blood culture (12/3): 1 set-MRSA  Blood culture (12/4): 1/2 sets-MRSA  Blood culture (12/5): X 2 sets/NGTD  Blood culture (12/8): X 2 sets-NGTD  Blood culture (12/14): X 2 sets-NGTD  Blood culture (12/15): X 2 sets-NGTD    Imaging:  CXR (12/14(: No infiltrates      ASSESSMENT:  Recent MRSA septicemia secondary to dialysis catheter  -- Hospitalized at [facility] from 12/2/2024 through 12/12/2024.  LAURA was negative for endocarditis of prosthetic aortic and mitral valves.  Patient cleared blood cultures on 12/5/2024.  New right groin tunneled dialysis catheter was placed 12/9/2024.  Patient was discharged on postdialysis vancomycin with a planned " "stop date of 1/16/2025.  No evidence now for recurrent MRSA sepsis  2.  Hypotension/leukocytosis  -- Leukocytosis has resolved and cultures are negative, exam unremarkable, feeling well and anxious for discharge    PLANS:  -   Stop Zosyn  -   Continue vancomycin 750 mg after every dialysis through 1/16/25   -   Continue prior regimen of \"vancomycin lock,\" 5mg/ml solution to dwell in dialysis catheter between dialysis sessions  -   Blood cultures X 2 one week following final IV vancomycin dose    No additional suggestions  Chat message will be sent to primary and nephrology   Follow-up with cindy Soto MD  [email or web address]  Office:  [phone]  "

## 2024-12-16 NOTE — PROGRESS NOTES
Vancomycin Dosing by Pharmacy- FOLLOW-UP (HEMODIALYSIS)    Batsheva Page is a 49 y.o. year old female who Pharmacy has been consulted for vancomycin dosing for sepsis/line infection. Based on the patient's indication and renal status this patient will be dosed based on a pre-HD level of 20-25 mcg/mL.     Patient is currently on hemodialysis User Schedule (TBD). Outpatient schedule MWF but currently off schedule. Received dialysis Sunday, next dialysis will be Tuesday 12/17 per nephrology.    Current vancomycin regimen or maintenance dose: none due to level of 30      Vancomycin pre-HD level 30.0 mcg/mL today but not having dialysis today.    Lab Results   Component Value Date    VANCORANDOM 30.0 (H) 12/16/2024    VANCOTROUGH 27.9 (HH) 09/26/2022       Visit Vitals  /87 (BP Location: Left arm, Patient Position: Lying)   Pulse 54   Temp 36.9 °C (98.5 °F) (Oral)   Resp 16        Lab Results   Component Value Date    CREATININE 4.22 (H) 12/16/2024    CREATININE 7.50 (H) 12/15/2024    CREATININE 5.92 (H) 12/14/2024    CREATININE 6.59 (H) 12/14/2024       I/O last 3 completed shifts:  In: 1220 (18 mL/kg) [P.O.:1020; IV Piggyback:200]  Out: 2000 (29.6 mL/kg) [Other:2000]  Weight: 67.6 kg     Assessment/Plan     Level is above target trough goal  No maintenance dose currently ordered, dose based on pre-HD level 12/17.  Next pre-HD level will be obtained on 12/17 at 0500. May be obtained sooner if clinically indicated.    Will continue to monitor renal function daily while on vancomycin and order serum creatinine at least every 48 hours if not already ordered.  Follow for continued vancomycin needs, clinical response, and signs/symptoms of toxicity.     Jesenia Lee, PharmD

## 2024-12-16 NOTE — ASSESSMENT & PLAN NOTE
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

## 2024-12-16 NOTE — PROGRESS NOTES
Physical Therapy    Physical Therapy Evaluation    Patient Name: Batsheva Page  MRN: [id number]  Department: [facility]  Room: [address] [place] [address]A  Today's Date: 12/16/2024   Time Calculation  Start Time: 1332  Stop Time: 1340  Time Calculation (min): 8 min    Assessment/Plan   PT Assessment  PT Assessment Results: Decreased strength, Decreased mobility  Rehab Prognosis: Good  Strengths: Support of extended family/friends, Premorbid level of function, Living arrangement secure, Attitude of self  Barriers to Participation: Comorbidities  End of Session Communication: Bedside nurse (OT spoke with RN who cleared pt to remain in bed without an alarm.)  Assessment Comment: She presented with the above listed impairments (see Assessment Results). Pt required S level assist during functional mobility compared to her reported baseline of indep. Pt would benefit from 1 additonal  PT visit to ensure maximizing independence and safety prior to discharge. Anticipate no skilled PT needs after d/c.  End of Session Patient Position: Bed, 2 rail up; alarm off, not on at start of session. Call light & needs within reach.  IP OR SWING BED PT PLAN  Inpatient or Swing Bed: Inpatient  PT Plan  Frequency: 1 additional visit the discontinue PT  Treatment/Interventions: Bed mobility, Transfer training, Gait training, Strengthening, Therapeutic exercise, Therapeutic activity  PT Plan: Ongoing PT  PT - OK to Discharge: Yes    Subjective   General Visit Information:  General  Reason for Referral: Impaired functional mobility. This 49 year old was admitted for septic shock on 12/14/24 then developed bleeding at HD catheter site. Of note, she was admitted at [facility] 12/3-12/12/24 for recurrent MRSA bacteremia 2° to infected HD catheter & metabolic encephalopathy. Pt s/p R groin/femroal HD catheter placement on 12/10/24.  Past Medical History Relevant to Rehab: CAD s/p CABG, chronic pain, CHF, CVA, ESRD on HD (MWF), endocarditis s/p AVR & MVR  (2022), HTN, parathyroidectomy, seizures  PT Missed Visit: Yes  Missed Visit Reason: Cancel  Family/Caregiver Present: No  Co-Treatment: OT (co-eval for maximizing therapeutic outcomes in a pt with relatively new femoral catheter placement who had desired to leave AMA)  Prior to Session Communication: Bedside nurse  Patient Position Received: Bed, 2 rail up, Alarm off, not on at start of session  General Comment: Cleared by RN for participation. Pt agreed to session and was fully engaged throughout.    Home Living:  Home Living  Type of Home: House  Lives With: Friends (1 friend)  Home Adaptive Equipment: Sock aid, Reacher (straight cane)  Home Layout: One level, Laundry main level  Entrance Stairs-Number of Steps: 4 stairs with unilatreal rail  Bathroom Shower/Tub: Tub/shower unit  Bathroom Equipment: Grab bars in shower  Home Living Comments: Friend and dtr both worked (different hours) and could provide some physical assist, if needed.    Prior Level of Function:  Prior Function Per Pt/Caregiver Report  ADL Assistance: Independent  Homemaking Assistance: Independent  Ambulatory Assistance:  (Denied any falls within the past 6 months)  Transfers: Independent  Gait: Independent (rare cane use PRN)  Stairs:  (Mod I with rail)  Prior Function Comments: (-) driving, friend or dtr provided transportation    Precautions:  Precautions  Hearing/Visual Limitations: Hearing WFL. Glasses donned.  Medical Precautions: Fall precautions  Precautions Comment: Telemetry. R femoral HD catheter, BUE IV (hep-locked).          Objective   Pain:  Pain Assessment  0-10 (Numeric) Pain Score: 0 - No pain (at rest. FLACC 1/10 acute pain at R groin catheter site during ambulation.)  Pain Interventions: Rest    Cognition:  Cognition  Overall Cognitive Status: Within Functional Limits (per observation. Per chart review A&O x4.)    General Assessments:  Activity Tolerance  Endurance: Endurance does not limit participation in activity (pain at  R groin catheter site limited ambulation distance)    Sensation  Sensation Comment: Not tested; pt denied paresthesias now and at baseline    ROM  BLE AROM: grossly WFL    Strength  Strength Comments: BLE: grossly >/=3+/5    Motor Control   Not formally assessed.  Movements are Fluid and Coordinated: Yes    Postural Control: Within Functional Limits    Static Sitting Balance   (No UE or LE support: indep)  Dynamic Sitting Balance    (No UE or LE support: close S engaging in ADL activity with OT)    Static Standing Balance   No upper extremity supported (indep)  Dynamic Standing Balance  No upper extremity supported (close S)      Functional Assessments:  Bed Mobility  Bed Mobility: Yes  Bed Mobility 1  Bed Mobility 1: Supine to sitting, Sitting to supine  Level of Assistance 1: Modified independent (HOB elevated >/=40°, toward L side)  Bed Mobility 2  Bed Mobility  2: Scooting (fwd while seated EOB)  Level of Assistance 2: Modified independent (used BUE)      Transfers  Transfer: Yes  Transfer 1  Technique 1: Sit to stand, Stand to sit  Transfer Device 1:  (BUE supported on EOB)  Transfer Level of Assistance 1: Close supervision  Trials/Comments 1: x1 trial each      Ambulation/Gait Training  Ambulation/Gait Training Performed: Yes  Ambulation/Gait Training 1  Surface 1: Level tile  Device 1: No device  Assistance 1: Close supervision (progressed from initial CGA.)  Quality of Gait 1: Wide base of support (increased lateral sway she attributed to R groin pain; slowed to adequate velocity; continuous movement.)  Comments/Distance (ft) 1: >/= 100 ft using step through pattern. No threat for LOB.       Outcome Measures:  [facility] Basic Mobility  Turning from your back to your side while in a flat bed without using bedrails: A little  Moving from lying on your back to sitting on the side of a flat bed without using bedrails: A little  Moving to and from bed to chair (including a wheelchair): A little  Standing up from a  chair using your arms (e.g. wheelchair or bedside chair): A little  To walk in hospital room: A little  Climbing 3-5 steps with railing: A little  Basic Mobility - Total Score: 18    Encounter Problems       Encounter Problems (Active)       PT Problem       Pt will transition supine<>sit at a flat bed with mod I.        Start:  12/16/24    Expected End:  12/29/24            Pt will transfer sit<>stand with mod I.        Start:  12/16/24    Expected End:  12/29/24            Pt will ambulate >/=100 ft independently.       Start:  12/16/24    Expected End:  12/29/24                   Education Documentation  Precautions, taught by Allie Escobar, PT at 12/16/2024  2:20 PM.  Learner: Patient  Readiness: Acceptance  Method: Explanation  Response: Verbalizes Understanding, Needs Reinforcement  Comment: Fall precautions.    Mobility Training, taught by Allie Escobar, PT at 12/16/2024  2:20 PM.  Learner: Patient  Readiness: Acceptance  Method: Explanation  Response: Verbalizes Understanding, Needs Reinforcement  Comment: Fall precautions.    Education Comments  -Educated pt in PT role and fall precautions (use of call light for nursing staff assist/supervision with mobility). She verbalized understanding.

## 2024-12-16 NOTE — CARE PLAN
The patient's goals for the shift include      The clinical goals for the shift include Hemodynamically stable    Problem: Pain - Adult  Goal: Verbalizes/displays adequate comfort level or baseline comfort level  Outcome: Progressing     Problem: Safety - Adult  Goal: Free from fall injury  Outcome: Progressing     Problem: Discharge Planning  Goal: Discharge to home or other facility with appropriate resources  Outcome: Progressing     Problem: Chronic Conditions and Co-morbidities  Goal: Patient's chronic conditions and co-morbidity symptoms are monitored and maintained or improved  Outcome: Progressing     Problem: Fall/Injury  Goal: Not fall by end of shift  Outcome: Progressing  Goal: Be free from injury by end of the shift  Outcome: Progressing  Goal: Verbalize understanding of personal risk factors for fall in the hospital  Outcome: Progressing  Goal: Verbalize understanding of risk factor reduction measures to prevent injury from fall in the home  Outcome: Progressing  Goal: Use assistive devices by end of the shift  Outcome: Progressing  Goal: Pace activities to prevent fatigue by end of the shift  Outcome: Progressing     Problem: Skin  Goal: Decreased wound size/increased tissue granulation at next dressing change  Outcome: Progressing  Goal: Participates in plan/prevention/treatment measures  Outcome: Progressing  Goal: Prevent/manage excess moisture  Outcome: Progressing  Goal: Prevent/minimize sheer/friction injuries  Outcome: Progressing  Goal: Promote/optimize nutrition  Outcome: Progressing  Goal: Promote skin healing  Outcome: Progressing

## 2024-12-16 NOTE — NURSING NOTE
"Patient refusing Keppra at this time, stating she feels a little nauseous and would like to wait. Informed patient I could reach out to physician to get her something to help with the nausea but she declined stating, \"I don't think it's that bad.\" Keppra dose moved to later in evening and will attempt to administer then, per patient request.   "

## 2024-12-16 NOTE — NURSING NOTE
Spoke to Dr. Walls regarding the Keppra dose and current refusal. He is willing to give the patient her Keppra dose IV, but patient still refusing. Patient educated on the importance of taking this medication.

## 2024-12-16 NOTE — CARE PLAN
The patient's goals for the shift include      The clinical goals for the shift include stable VS, pain control

## 2024-12-16 NOTE — PROGRESS NOTES
Pt anticipated to dc home without further skilled needs.      12/16/24 2721   Discharge Planning   Expected Discharge Disposition Home

## 2024-12-16 NOTE — PROGRESS NOTES
Batsheva Page is a 49 y.o. female on day 2 of admission presenting with Septic shock (Multi).      Subjective   Having pain and oozing at right fem HD site. Patient states she feels frustrated that she's been in the hospital for so long and really wants to go home.        Objective     Last Recorded Vitals  /80 (BP Location: Left arm, Patient Position: Lying)   Pulse 54   Temp 36.7 °C (98.1 °F) (Oral)   Resp 17   Wt 67.6 kg (149 lb 0.5 oz)   SpO2 98%   Intake/Output last 3 Shifts:    Intake/Output Summary (Last 24 hours) at 12/16/2024 0842  Last data filed at 12/16/2024 0530  Gross per 24 hour   Intake 700 ml   Output 2000 ml   Net -1300 ml       Admission Weight  Weight: 69 kg (152 lb 1.6 oz) (12/14/24 0045)    Daily Weight  12/16/24 : 67.6 kg (149 lb 0.5 oz)    Image Results  ECG 12 Lead  Normal sinus rhythm  Normal ECG  When compared with ECG of 04-DEC-2024 21:19,  Premature atrial complexes are no longer Present  Borderline criteria for Inferior infarct are no longer Present  T wave inversion now evident in Lateral leads  Confirmed by Francis Ramon ([id number]) on 12/16/2024 8:10:28 AM      Physical Exam  Constitutional:       General: She is not in acute distress.     Appearance: She is not toxic-appearing.      Comments: Uncomfortable appearing   HENT:      Head: Normocephalic.      Mouth/Throat:      Pharynx: Oropharynx is clear.   Eyes:      General: No scleral icterus.  Cardiovascular:      Rate and Rhythm: Normal rate.      Heart sounds: Murmur heard.   Pulmonary:      Effort: No respiratory distress.      Breath sounds: No wheezing.   Abdominal:      General: There is no distension.      Palpations: Abdomen is soft.   Musculoskeletal:         General: Tenderness (around right fem HD site) present.      Right lower leg: No edema.      Left lower leg: No edema.   Skin:     Findings: Bruising (right upper thigh around HD site) present.      Comments: Bandaging soaked with blood and oozing  around right fem HD site.    Neurological:      Mental Status: She is alert.         Relevant Results               Assessment/Plan        This patient has a central line   Reason for the central line remaining today? 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 and aortic valve endocarditis s/p aortic and mitral valve replacements   ID following  Continue antibiotics per ID  ESRD (end stage renal disease) on dialysis (Multi)  Nephrology following  HD MWF  Continue calcitriol   Primary hypertension  Continue clonidine and metoprolol  Seizure (Multi)  Continue keppra   Bleeding due to dialysis catheter placement ([facility])  Oozing from right HD fem site  IR consulted   Hold subQ heparin for now     Plan:  Increased oozing and pain around right fem HD site -> likely needs to be removed and re-placed, IR consulted; hold subQ heparin for now  Follow up blood cultures from 12/15, continue antibiotics per ID  HD per nephrology   Anticipate discharge home with no needs when cleared by consultants                 Keeley Murdock MD

## 2024-12-17 ENCOUNTER — PATIENT OUTREACH (OUTPATIENT)
Dept: CARE COORDINATION | Facility: CLINIC | Age: 49
End: 2024-12-17
Payer: MEDICARE

## 2024-12-17 NOTE — PROGRESS NOTES
[facility]  Admitted 12/14/2024  Discharged 12/16/2024  Dx: Septic Shock, Hypotension, MRSA Bacteremia, Metabolic encephalopathy    Continue Vancomycin on dialysis days end date 1/16/2024    Outreach call to patient to support a smooth transition of care from recent admission.  Patient states, she is doing okay. Patient states, she did have symptoms that her BP was low. Patient states, her legs felt like jello but that has resolved. Patient states, she does not check her BP at but it does get checked on dialysis days. Reviewed discharge medications, discharge instructions, assessed social needs, and provided education on importance of follow-up appointment with provider.        Engagement  Call Start Time: 1646 (12/17/2024  4:46 PM)    Medications  Medications reviewed with patient/caregiver?: Yes (12/17/2024  4:46 PM)  Is the patient having any side effects they believe may be caused by any medication additions or changes?: No (12/17/2024  4:46 PM)  Does the patient have all medications ordered at discharge?: Yes (12/17/2024  4:46 PM)  Care Management Interventions: No intervention needed (12/17/2024  4:46 PM)  Prescription Comments: Discharged on Methocarbimol and Hydroxyzine (12/17/2024  4:46 PM)  Is the patient taking all medications as directed (includes completed medication regime)?: Yes (12/17/2024  4:46 PM)    Appointments  Does the patient have a primary care provider?: Yes (12/17/2024  4:46 PM)    Self Management  What is the home health agency?: not applicable (12/17/2024  4:46 PM)  What Durable Medical Equipment (DME) was ordered?: not applicable (12/17/2024  4:46 PM)    Patient Teaching  Care Management Interventions: Reviewed instructions with patient (12/17/2024  4:46 PM)  What is the patient's perception of their health status since discharge?: Improving (12/17/2024  4:46 PM)  Is the patient/caregiver able to teach back the hierarchy of who to call/visit for symptoms/problems? PCP,  Specialist, Home Health nurse, Urgent Care, ED, 911: Yes (12/17/2024  4:46 PM)    Will continue to monitor through transition period.    Demetria Martinez RN/CM  [facility] [place] [facility]  [phone]

## 2024-12-18 LAB
BACTERIA BLD CULT: NORMAL
BACTERIA BLD CULT: NORMAL

## 2024-12-19 LAB
ATRIAL RATE: 51 BPM
P AXIS: 57 DEGREES
P OFFSET: 200 MS
P ONSET: 137 MS
PR INTERVAL: 164 MS
Q ONSET: 219 MS
QRS COUNT: 9 BEATS
QRS DURATION: 78 MS
QT INTERVAL: 526 MS
QTC CALCULATION(BAZETT): 484 MS
QTC FREDERICIA: 498 MS
R AXIS: 6 DEGREES
T AXIS: 76 DEGREES
T OFFSET: 482 MS
VENTRICULAR RATE: 51 BPM

## 2024-12-20 LAB
BACTERIA BLD CULT: NORMAL
BACTERIA BLD CULT: NORMAL

## 2024-12-31 ENCOUNTER — PATIENT OUTREACH (OUTPATIENT)
Dept: CARE COORDINATION | Facility: CLINIC | Age: 49
End: 2024-12-31
Payer: MEDICARE

## 2024-12-31 NOTE — PROGRESS NOTES
Outreach call to patient to check in 2 weeks after hospital discharge to support smooth transition of care.  No answer and mailbox is full.  Will continue to follow.      Demetria Martinez RN/CM  [facility]  [phone]

## 2025-01-15 ENCOUNTER — PATIENT OUTREACH (OUTPATIENT)
Dept: CARE COORDINATION | Facility: CLINIC | Age: 50
End: 2025-01-15
Payer: MEDICARE

## 2025-01-17 ENCOUNTER — PATIENT OUTREACH (OUTPATIENT)
Dept: CARE COORDINATION | Facility: CLINIC | Age: 50
End: 2025-01-17
Payer: MEDICARE

## 2025-01-17 NOTE — PROGRESS NOTES
Outreach call to patient to check in 30 days after hospital discharge to support smooth transition of care.  Patient states, she is doing good. Denies any lightheadedness or dizziness. She states. Dialysis in monitoring her blood pressure. Patient with no additional needs noted. No additional outreach needed at this time. CM will close case.    Demetria Martinez RN/CM  [facility] [place] [facility]  [phone]

## 2025-02-11 NOTE — NURSING NOTE
Pt to be dc home, no bleeding noted to rt groin cath site   Patient/Caregiver requests family/friend to interpret.

## 2025-03-04 LAB
Q ONSET: 221 MS
QRS COUNT: 16 BEATS
QRS DURATION: 76 MS
QT INTERVAL: 320 MS
QTC CALCULATION(BAZETT): 404 MS
QTC FREDERICIA: 374 MS
R AXIS: -5 DEGREES
T AXIS: 35 DEGREES
T OFFSET: 381 MS
VENTRICULAR RATE: 96 BPM

## 2025-04-16 ENCOUNTER — LAB REQUISITION (OUTPATIENT)
Dept: LAB | Facility: HOSPITAL | Age: 50
End: 2025-04-16
Payer: MEDICARE

## 2025-04-16 PROCEDURE — 87075 CULTR BACTERIA EXCEPT BLOOD: CPT

## 2025-04-16 PROCEDURE — 87040 BLOOD CULTURE FOR BACTERIA: CPT

## 2025-04-17 ENCOUNTER — APPOINTMENT (OUTPATIENT)
Dept: DIALYSIS | Facility: HOSPITAL | Age: 50
End: 2025-04-17
Payer: MEDICARE

## 2025-04-17 ENCOUNTER — HOSPITAL ENCOUNTER (INPATIENT)
Facility: HOSPITAL | Age: 50
DRG: 919 | End: 2025-04-17
Attending: EMERGENCY MEDICINE | Admitting: INTERNAL MEDICINE
Payer: MEDICARE

## 2025-04-17 ENCOUNTER — APPOINTMENT (OUTPATIENT)
Dept: CARDIOLOGY | Facility: HOSPITAL | Age: 50
DRG: 919 | End: 2025-04-17
Payer: MEDICARE

## 2025-04-17 DIAGNOSIS — T82.9XXA COMPLICATION ASSOCIATED WITH DIALYSIS CATHETER: ICD-10-CM

## 2025-04-17 DIAGNOSIS — I10 PRIMARY HYPERTENSION: ICD-10-CM

## 2025-04-17 DIAGNOSIS — E87.5 HYPERKALEMIA: Primary | ICD-10-CM

## 2025-04-17 DIAGNOSIS — Z95.2 S/P MVR (MITRAL VALVE REPLACEMENT): ICD-10-CM

## 2025-04-17 DIAGNOSIS — N18.6 ESRD (END STAGE RENAL DISEASE) (MULTI): ICD-10-CM

## 2025-04-17 PROBLEM — L08.9 RIGHT FOOT INFECTION: Status: ACTIVE | Noted: 2025-04-17

## 2025-04-17 LAB
ALBUMIN SERPL BCP-MCNC: 3.8 G/DL (ref 3.4–5)
ALP SERPL-CCNC: 68 U/L (ref 33–110)
ALT SERPL W P-5'-P-CCNC: 9 U/L (ref 7–45)
ANION GAP SERPL CALCULATED.3IONS-SCNC: 19 MMOL/L (ref 10–20)
ANION GAP SERPL CALCULATED.3IONS-SCNC: 19 MMOL/L (ref 10–20)
AST SERPL W P-5'-P-CCNC: 17 U/L (ref 9–39)
BASOPHILS # BLD AUTO: 0.04 X10*3/UL (ref 0–0.1)
BASOPHILS NFR BLD AUTO: 0.7 %
BILIRUB SERPL-MCNC: 0.5 MG/DL (ref 0–1.2)
BUN SERPL-MCNC: 41 MG/DL (ref 6–23)
BUN SERPL-MCNC: 41 MG/DL (ref 6–23)
CALCIUM SERPL-MCNC: 6.9 MG/DL (ref 8.6–10.3)
CALCIUM SERPL-MCNC: 7.2 MG/DL (ref 8.6–10.3)
CHLORIDE SERPL-SCNC: 90 MMOL/L (ref 98–107)
CHLORIDE SERPL-SCNC: 92 MMOL/L (ref 98–107)
CO2 SERPL-SCNC: 29 MMOL/L (ref 21–32)
CO2 SERPL-SCNC: 30 MMOL/L (ref 21–32)
CREAT SERPL-MCNC: 6.94 MG/DL (ref 0.5–1.05)
CREAT SERPL-MCNC: 6.95 MG/DL (ref 0.5–1.05)
EGFRCR SERPLBLD CKD-EPI 2021: 7 ML/MIN/1.73M*2
EGFRCR SERPLBLD CKD-EPI 2021: 7 ML/MIN/1.73M*2
EOSINOPHIL # BLD AUTO: 0.56 X10*3/UL (ref 0–0.7)
EOSINOPHIL NFR BLD AUTO: 9.6 %
ERYTHROCYTE [DISTWIDTH] IN BLOOD BY AUTOMATED COUNT: 16.5 % (ref 11.5–14.5)
GLUCOSE BLD MANUAL STRIP-MCNC: 82 MG/DL (ref 74–99)
GLUCOSE SERPL-MCNC: 82 MG/DL (ref 74–99)
GLUCOSE SERPL-MCNC: 85 MG/DL (ref 74–99)
HCT VFR BLD AUTO: 35.4 % (ref 36–46)
HGB BLD-MCNC: 11.1 G/DL (ref 12–16)
IMM GRANULOCYTES # BLD AUTO: 0.01 X10*3/UL (ref 0–0.7)
IMM GRANULOCYTES NFR BLD AUTO: 0.2 % (ref 0–0.9)
LACTATE SERPL-SCNC: 0.6 MMOL/L (ref 0.4–2)
LYMPHOCYTES # BLD AUTO: 1.09 X10*3/UL (ref 1.2–4.8)
LYMPHOCYTES NFR BLD AUTO: 18.7 %
MCH RBC QN AUTO: 30.8 PG (ref 26–34)
MCHC RBC AUTO-ENTMCNC: 31.4 G/DL (ref 32–36)
MCV RBC AUTO: 98 FL (ref 80–100)
MONOCYTES # BLD AUTO: 0.36 X10*3/UL (ref 0.1–1)
MONOCYTES NFR BLD AUTO: 6.2 %
NEUTROPHILS # BLD AUTO: 3.77 X10*3/UL (ref 1.2–7.7)
NEUTROPHILS NFR BLD AUTO: 64.6 %
NRBC BLD-RTO: 0 /100 WBCS (ref 0–0)
PLATELET # BLD AUTO: 166 X10*3/UL (ref 150–450)
POTASSIUM SERPL-SCNC: 7.6 MMOL/L (ref 3.5–5.3)
POTASSIUM SERPL-SCNC: 8.2 MMOL/L (ref 3.5–5.3)
PROT SERPL-MCNC: 8.6 G/DL (ref 6.4–8.2)
RBC # BLD AUTO: 3.6 X10*6/UL (ref 4–5.2)
SODIUM SERPL-SCNC: 131 MMOL/L (ref 136–145)
SODIUM SERPL-SCNC: 132 MMOL/L (ref 136–145)
WBC # BLD AUTO: 5.8 X10*3/UL (ref 4.4–11.3)

## 2025-04-17 PROCEDURE — 83605 ASSAY OF LACTIC ACID: CPT

## 2025-04-17 PROCEDURE — 80053 COMPREHEN METABOLIC PANEL: CPT

## 2025-04-17 PROCEDURE — 2500000005 HC RX 250 GENERAL PHARMACY W/O HCPCS

## 2025-04-17 PROCEDURE — 96375 TX/PRO/DX INJ NEW DRUG ADDON: CPT

## 2025-04-17 PROCEDURE — 2500000004 HC RX 250 GENERAL PHARMACY W/ HCPCS (ALT 636 FOR OP/ED): Mod: JZ | Performed by: EMERGENCY MEDICINE

## 2025-04-17 PROCEDURE — 99285 EMERGENCY DEPT VISIT HI MDM: CPT | Mod: 25 | Performed by: EMERGENCY MEDICINE

## 2025-04-17 PROCEDURE — 93005 ELECTROCARDIOGRAM TRACING: CPT

## 2025-04-17 PROCEDURE — 80048 BASIC METABOLIC PNL TOTAL CA: CPT | Mod: CCI

## 2025-04-17 PROCEDURE — 99291 CRITICAL CARE FIRST HOUR: CPT | Mod: 25

## 2025-04-17 PROCEDURE — 99291 CRITICAL CARE FIRST HOUR: CPT

## 2025-04-17 PROCEDURE — 36415 COLL VENOUS BLD VENIPUNCTURE: CPT

## 2025-04-17 PROCEDURE — 82947 ASSAY GLUCOSE BLOOD QUANT: CPT

## 2025-04-17 PROCEDURE — 5A1D70Z PERFORMANCE OF URINARY FILTRATION, INTERMITTENT, LESS THAN 6 HOURS PER DAY: ICD-10-PCS | Performed by: INTERNAL MEDICINE

## 2025-04-17 PROCEDURE — 96374 THER/PROPH/DIAG INJ IV PUSH: CPT

## 2025-04-17 PROCEDURE — 2500000001 HC RX 250 WO HCPCS SELF ADMINISTERED DRUGS (ALT 637 FOR MEDICARE OP)

## 2025-04-17 PROCEDURE — 2500000004 HC RX 250 GENERAL PHARMACY W/ HCPCS (ALT 636 FOR OP/ED): Mod: JZ

## 2025-04-17 PROCEDURE — 02HV33Z INSERTION OF INFUSION DEVICE INTO SUPERIOR VENA CAVA, PERCUTANEOUS APPROACH: ICD-10-PCS | Performed by: SURGERY

## 2025-04-17 PROCEDURE — 87081 CULTURE SCREEN ONLY: CPT | Mod: WESLAB | Performed by: SURGERY

## 2025-04-17 PROCEDURE — 2500000002 HC RX 250 W HCPCS SELF ADMINISTERED DRUGS (ALT 637 FOR MEDICARE OP, ALT 636 FOR OP/ED): Performed by: INTERNAL MEDICINE

## 2025-04-17 PROCEDURE — 85025 COMPLETE CBC W/AUTO DIFF WBC: CPT

## 2025-04-17 PROCEDURE — 93010 ELECTROCARDIOGRAM REPORT: CPT | Performed by: INTERNAL MEDICINE

## 2025-04-17 PROCEDURE — 2500000005 HC RX 250 GENERAL PHARMACY W/O HCPCS: Performed by: INTERNAL MEDICINE

## 2025-04-17 PROCEDURE — 87040 BLOOD CULTURE FOR BACTERIA: CPT | Mod: WESLAB

## 2025-04-17 PROCEDURE — 2500000004 HC RX 250 GENERAL PHARMACY W/ HCPCS (ALT 636 FOR OP/ED): Mod: JZ | Performed by: SURGERY

## 2025-04-17 PROCEDURE — 2020000001 HC ICU ROOM DAILY

## 2025-04-17 PROCEDURE — 8010000001 HC DIALYSIS - HEMODIALYSIS PER DAY

## 2025-04-17 PROCEDURE — 36556 INSERT NON-TUNNEL CV CATH: CPT | Performed by: SURGERY

## 2025-04-17 PROCEDURE — 2500000004 HC RX 250 GENERAL PHARMACY W/ HCPCS (ALT 636 FOR OP/ED): Mod: JZ | Performed by: INTERNAL MEDICINE

## 2025-04-17 RX ORDER — ASPIRIN 81 MG/1
81 TABLET ORAL DAILY
Status: DISCONTINUED | OUTPATIENT
Start: 2025-04-17 | End: 2025-04-17 | Stop reason: SDUPTHER

## 2025-04-17 RX ORDER — METHOCARBAMOL 500 MG/1
750 TABLET, FILM COATED ORAL EVERY 6 HOURS PRN
Status: DISCONTINUED | OUTPATIENT
Start: 2025-04-17 | End: 2025-04-25

## 2025-04-17 RX ORDER — LIDOCAINE HYDROCHLORIDE 10 MG/ML
10 INJECTION, SOLUTION EPIDURAL; INFILTRATION; INTRACAUDAL; PERINEURAL ONCE
Status: DISCONTINUED | OUTPATIENT
Start: 2025-04-17 | End: 2025-04-17

## 2025-04-17 RX ORDER — PREGABALIN 50 MG/1
50 CAPSULE ORAL 2 TIMES DAILY
Status: DISCONTINUED | OUTPATIENT
Start: 2025-04-17 | End: 2025-04-27 | Stop reason: HOSPADM

## 2025-04-17 RX ORDER — CALCIUM GLUCONATE 20 MG/ML
2 INJECTION, SOLUTION INTRAVENOUS ONCE
Status: COMPLETED | OUTPATIENT
Start: 2025-04-17 | End: 2025-04-17

## 2025-04-17 RX ORDER — LIDOCAINE HYDROCHLORIDE 10 MG/ML
50 INJECTION, SOLUTION INFILTRATION; PERINEURAL ONCE
Status: COMPLETED | OUTPATIENT
Start: 2025-04-17 | End: 2025-04-17

## 2025-04-17 RX ORDER — HEPARIN SODIUM 1000 [USP'U]/ML
1000 INJECTION, SOLUTION INTRAVENOUS; SUBCUTANEOUS
Status: DISCONTINUED | OUTPATIENT
Start: 2025-04-17 | End: 2025-04-19

## 2025-04-17 RX ORDER — ATORVASTATIN CALCIUM 40 MG/1
40 TABLET, FILM COATED ORAL DAILY
Status: DISCONTINUED | OUTPATIENT
Start: 2025-04-17 | End: 2025-04-17 | Stop reason: SDUPTHER

## 2025-04-17 RX ORDER — LEVETIRACETAM 500 MG/1
750 TABLET ORAL NIGHTLY
Status: DISCONTINUED | OUTPATIENT
Start: 2025-04-17 | End: 2025-04-17 | Stop reason: SDUPTHER

## 2025-04-17 RX ORDER — CLONIDINE HYDROCHLORIDE 0.1 MG/1
0.1 TABLET ORAL EVERY 8 HOURS SCHEDULED
Status: DISCONTINUED | OUTPATIENT
Start: 2025-04-18 | End: 2025-04-22

## 2025-04-17 RX ORDER — DIPHENHYDRAMINE HYDROCHLORIDE 50 MG/ML
50 INJECTION, SOLUTION INTRAMUSCULAR; INTRAVENOUS ONCE
Status: COMPLETED | OUTPATIENT
Start: 2025-04-17 | End: 2025-04-17

## 2025-04-17 RX ORDER — FENTANYL CITRATE 50 UG/ML
50 INJECTION, SOLUTION INTRAMUSCULAR; INTRAVENOUS ONCE
Status: COMPLETED | OUTPATIENT
Start: 2025-04-17 | End: 2025-04-17

## 2025-04-17 RX ORDER — HYDROMORPHONE HYDROCHLORIDE 1 MG/ML
1 INJECTION, SOLUTION INTRAMUSCULAR; INTRAVENOUS; SUBCUTANEOUS ONCE
Status: COMPLETED | OUTPATIENT
Start: 2025-04-17 | End: 2025-04-17

## 2025-04-17 RX ORDER — OXYCODONE AND ACETAMINOPHEN 5; 325 MG/1; MG/1
1 TABLET ORAL EVERY 6 HOURS PRN
Refills: 0 | Status: DISCONTINUED | OUTPATIENT
Start: 2025-04-17 | End: 2025-04-17 | Stop reason: SDUPTHER

## 2025-04-17 RX ORDER — CLONIDINE HYDROCHLORIDE 0.1 MG/1
0.1 TABLET ORAL EVERY 8 HOURS SCHEDULED
Status: DISCONTINUED | OUTPATIENT
Start: 2025-04-17 | End: 2025-04-17 | Stop reason: SDUPTHER

## 2025-04-17 RX ORDER — PREGABALIN 50 MG/1
50 CAPSULE ORAL 2 TIMES DAILY
Status: DISCONTINUED | OUTPATIENT
Start: 2025-04-17 | End: 2025-04-17 | Stop reason: SDUPTHER

## 2025-04-17 RX ORDER — HEPARIN SODIUM 1000 [USP'U]/ML
1000 INJECTION, SOLUTION INTRAVENOUS; SUBCUTANEOUS
Status: CANCELLED | OUTPATIENT
Start: 2025-04-17

## 2025-04-17 RX ORDER — CLONIDINE HYDROCHLORIDE 0.1 MG/1
0.1 TABLET ORAL EVERY 8 HOURS SCHEDULED
Status: DISCONTINUED | OUTPATIENT
Start: 2025-04-17 | End: 2025-04-17

## 2025-04-17 RX ORDER — HYDROXYZINE HYDROCHLORIDE 25 MG/1
25 TABLET, FILM COATED ORAL EVERY 6 HOURS PRN
Status: DISCONTINUED | OUTPATIENT
Start: 2025-04-17 | End: 2025-04-27 | Stop reason: HOSPADM

## 2025-04-17 RX ORDER — HYDROMORPHONE HYDROCHLORIDE 0.2 MG/ML
0.2 INJECTION INTRAMUSCULAR; INTRAVENOUS; SUBCUTANEOUS EVERY 4 HOURS PRN
Status: DISCONTINUED | OUTPATIENT
Start: 2025-04-17 | End: 2025-04-18

## 2025-04-17 RX ORDER — DEXTROSE MONOHYDRATE 100 MG/ML
50 INJECTION, SOLUTION INTRAVENOUS CONTINUOUS
Status: DISCONTINUED | OUTPATIENT
Start: 2025-04-17 | End: 2025-04-17

## 2025-04-17 RX ORDER — ASPIRIN 81 MG/1
81 TABLET ORAL DAILY
Status: DISCONTINUED | OUTPATIENT
Start: 2025-04-18 | End: 2025-04-27 | Stop reason: HOSPADM

## 2025-04-17 RX ORDER — VANCOMYCIN 1.25 G/250ML
1750 INJECTION, SOLUTION INTRAVENOUS ONCE
Status: COMPLETED | OUTPATIENT
Start: 2025-04-17 | End: 2025-04-18

## 2025-04-17 RX ORDER — OXYCODONE AND ACETAMINOPHEN 5; 325 MG/1; MG/1
1 TABLET ORAL EVERY 6 HOURS PRN
Refills: 0 | Status: DISCONTINUED | OUTPATIENT
Start: 2025-04-17 | End: 2025-04-18

## 2025-04-17 RX ORDER — HEPARIN SODIUM 5000 [USP'U]/ML
5000 INJECTION, SOLUTION INTRAVENOUS; SUBCUTANEOUS EVERY 8 HOURS SCHEDULED
Status: DISCONTINUED | OUTPATIENT
Start: 2025-04-17 | End: 2025-04-17

## 2025-04-17 RX ORDER — ACETAMINOPHEN 325 MG/1
650 TABLET ORAL EVERY 6 HOURS PRN
Status: DISCONTINUED | OUTPATIENT
Start: 2025-04-17 | End: 2025-04-18

## 2025-04-17 RX ORDER — SODIUM BICARBONATE 1 MEQ/ML
50 SYRINGE (ML) INTRAVENOUS ONCE
Status: COMPLETED | OUTPATIENT
Start: 2025-04-17 | End: 2025-04-17

## 2025-04-17 RX ORDER — MIDAZOLAM HYDROCHLORIDE 1 MG/ML
2 INJECTION, SOLUTION INTRAMUSCULAR; INTRAVENOUS ONCE
Status: COMPLETED | OUTPATIENT
Start: 2025-04-17 | End: 2025-04-17

## 2025-04-17 RX ORDER — PROMETHAZINE HYDROCHLORIDE 25 MG/1
25 TABLET ORAL DAILY PRN
Status: DISCONTINUED | OUTPATIENT
Start: 2025-04-17 | End: 2025-04-21

## 2025-04-17 RX ORDER — ATORVASTATIN CALCIUM 40 MG/1
40 TABLET, FILM COATED ORAL DAILY
Status: DISCONTINUED | OUTPATIENT
Start: 2025-04-18 | End: 2025-04-27 | Stop reason: HOSPADM

## 2025-04-17 RX ORDER — VANCOMYCIN 1 G/200ML
1000 INJECTION, SOLUTION INTRAVENOUS ONCE
Status: DISCONTINUED | OUTPATIENT
Start: 2025-04-17 | End: 2025-04-17

## 2025-04-17 RX ORDER — METOPROLOL TARTRATE 25 MG/1
25 TABLET, FILM COATED ORAL 2 TIMES DAILY
Status: DISCONTINUED | OUTPATIENT
Start: 2025-04-17 | End: 2025-04-17 | Stop reason: SDUPTHER

## 2025-04-17 RX ORDER — DEXTROSE 50 % IN WATER (D50W) INTRAVENOUS SYRINGE
25 ONCE
Status: COMPLETED | OUTPATIENT
Start: 2025-04-17 | End: 2025-04-17

## 2025-04-17 RX ORDER — DEXTROSE 50 % IN WATER (D50W) INTRAVENOUS SYRINGE
25 ONCE
Status: DISCONTINUED | OUTPATIENT
Start: 2025-04-17 | End: 2025-04-17

## 2025-04-17 RX ORDER — DEXTROSE 50 % IN WATER (D50W) INTRAVENOUS SYRINGE
Status: COMPLETED
Start: 2025-04-17 | End: 2025-04-17

## 2025-04-17 RX ORDER — OXYCODONE AND ACETAMINOPHEN 5; 325 MG/1; MG/1
2 TABLET ORAL ONCE
Refills: 0 | Status: COMPLETED | OUTPATIENT
Start: 2025-04-17 | End: 2025-04-17

## 2025-04-17 RX ORDER — METOPROLOL TARTRATE 25 MG/1
25 TABLET, FILM COATED ORAL 2 TIMES DAILY
Status: DISCONTINUED | OUTPATIENT
Start: 2025-04-17 | End: 2025-04-26

## 2025-04-17 RX ORDER — HYDROMORPHONE HYDROCHLORIDE 0.2 MG/ML
0.2 INJECTION INTRAMUSCULAR; INTRAVENOUS; SUBCUTANEOUS EVERY 4 HOURS PRN
Status: DISCONTINUED | OUTPATIENT
Start: 2025-04-17 | End: 2025-04-17

## 2025-04-17 RX ORDER — CALCITRIOL 0.25 UG/1
0.5 CAPSULE ORAL DAILY
Status: DISCONTINUED | OUTPATIENT
Start: 2025-04-17 | End: 2025-04-17 | Stop reason: SDUPTHER

## 2025-04-17 RX ORDER — ERGOCALCIFEROL 1.25 MG/1
1.25 CAPSULE ORAL
Status: DISCONTINUED | OUTPATIENT
Start: 2025-04-20 | End: 2025-04-27 | Stop reason: HOSPADM

## 2025-04-17 RX ORDER — DIPHENHYDRAMINE HYDROCHLORIDE 50 MG/ML
25 INJECTION, SOLUTION INTRAMUSCULAR; INTRAVENOUS ONCE
Status: COMPLETED | OUTPATIENT
Start: 2025-04-17 | End: 2025-04-17

## 2025-04-17 RX ORDER — CALCITRIOL 0.25 UG/1
0.5 CAPSULE ORAL DAILY
Status: DISCONTINUED | OUTPATIENT
Start: 2025-04-18 | End: 2025-04-27 | Stop reason: HOSPADM

## 2025-04-17 RX ORDER — VANCOMYCIN HYDROCHLORIDE 1 G/20ML
INJECTION, POWDER, LYOPHILIZED, FOR SOLUTION INTRAVENOUS DAILY PRN
Status: DISCONTINUED | OUTPATIENT
Start: 2025-04-17 | End: 2025-04-24

## 2025-04-17 RX ADMIN — MIDAZOLAM HYDROCHLORIDE 2 MG: 1 INJECTION, SOLUTION INTRAMUSCULAR; INTRAVENOUS at 20:09

## 2025-04-17 RX ADMIN — SODIUM BICARBONATE 50 MEQ: 84 INJECTION INTRAVENOUS at 17:24

## 2025-04-17 RX ADMIN — DIPHENHYDRAMINE HYDROCHLORIDE 25 MG: 50 INJECTION, SOLUTION INTRAMUSCULAR; INTRAVENOUS at 14:38

## 2025-04-17 RX ADMIN — DEXTROSE 50 ML/HR: 10 SOLUTION INTRAVENOUS at 17:25

## 2025-04-17 RX ADMIN — HYDROMORPHONE HYDROCHLORIDE 1 MG: 1 INJECTION, SOLUTION INTRAMUSCULAR; INTRAVENOUS; SUBCUTANEOUS at 14:38

## 2025-04-17 RX ADMIN — MIDAZOLAM HYDROCHLORIDE 2 MG: 1 INJECTION, SOLUTION INTRAMUSCULAR; INTRAVENOUS at 20:30

## 2025-04-17 RX ADMIN — INSULIN HUMAN 10 UNITS: 100 INJECTION, SOLUTION PARENTERAL at 17:23

## 2025-04-17 RX ADMIN — DIPHENHYDRAMINE HYDROCHLORIDE 50 MG: 50 INJECTION, SOLUTION INTRAMUSCULAR; INTRAVENOUS at 17:47

## 2025-04-17 RX ADMIN — DEXTROSE 50 % IN WATER (D50W) INTRAVENOUS SYRINGE 25 G: at 17:41

## 2025-04-17 RX ADMIN — PIPERACILLIN SODIUM AND TAZOBACTAM SODIUM 4.5 G: 4; .5 INJECTION, SOLUTION INTRAVENOUS at 14:45

## 2025-04-17 RX ADMIN — FENTANYL CITRATE 50 MCG: 0.05 INJECTION, SOLUTION INTRAMUSCULAR; INTRAVENOUS at 20:16

## 2025-04-17 RX ADMIN — CALCIUM GLUCONATE 2 G: 20 INJECTION, SOLUTION INTRAVENOUS at 17:25

## 2025-04-17 RX ADMIN — DEXTROSE MONOHYDRATE 25 G: 25 INJECTION, SOLUTION INTRAVENOUS at 17:41

## 2025-04-17 RX ADMIN — MIDAZOLAM 2 MG: 1 INJECTION INTRAMUSCULAR; INTRAVENOUS at 19:57

## 2025-04-17 RX ADMIN — LIDOCAINE HYDROCHLORIDE 50 MG: 10 INJECTION, SOLUTION EPIDURAL; INFILTRATION; INTRACAUDAL; PERINEURAL at 20:50

## 2025-04-17 RX ADMIN — HYDROMORPHONE HYDROCHLORIDE 0.2 MG: 0.2 INJECTION, SOLUTION INTRAMUSCULAR; INTRAVENOUS; SUBCUTANEOUS at 17:42

## 2025-04-17 RX ADMIN — HYDROMORPHONE HYDROCHLORIDE 0.2 MG: 0.2 INJECTION, SOLUTION INTRAMUSCULAR; INTRAVENOUS; SUBCUTANEOUS at 21:43

## 2025-04-17 RX ADMIN — DIPHENHYDRAMINE HYDROCHLORIDE 50 MG: 50 INJECTION, SOLUTION INTRAMUSCULAR; INTRAVENOUS at 21:43

## 2025-04-17 RX ADMIN — OXYCODONE HYDROCHLORIDE AND ACETAMINOPHEN 2 TABLET: 5; 325 TABLET ORAL at 13:24

## 2025-04-17 SDOH — SOCIAL STABILITY: SOCIAL INSECURITY: WITHIN THE LAST YEAR, HAVE YOU BEEN AFRAID OF YOUR PARTNER OR EX-PARTNER?: NO

## 2025-04-17 SDOH — ECONOMIC STABILITY: FOOD INSECURITY: WITHIN THE PAST 12 MONTHS, THE FOOD YOU BOUGHT JUST DIDN'T LAST AND YOU DIDN'T HAVE MONEY TO GET MORE.: NEVER TRUE

## 2025-04-17 SDOH — ECONOMIC STABILITY: FOOD INSECURITY: WITHIN THE PAST 12 MONTHS, YOU WORRIED THAT YOUR FOOD WOULD RUN OUT BEFORE YOU GOT THE MONEY TO BUY MORE.: NEVER TRUE

## 2025-04-17 SDOH — HEALTH STABILITY: MENTAL HEALTH: HOW OFTEN DO YOU HAVE SIX OR MORE DRINKS ON ONE OCCASION?: NEVER

## 2025-04-17 SDOH — SOCIAL STABILITY: SOCIAL INSECURITY: WITHIN THE LAST YEAR, HAVE YOU BEEN HUMILIATED OR EMOTIONALLY ABUSED IN OTHER WAYS BY YOUR PARTNER OR EX-PARTNER?: NO

## 2025-04-17 SDOH — SOCIAL STABILITY: SOCIAL INSECURITY: ABUSE: ADULT

## 2025-04-17 SDOH — ECONOMIC STABILITY: HOUSING INSECURITY: AT ANY TIME IN THE PAST 12 MONTHS, WERE YOU HOMELESS OR LIVING IN A SHELTER (INCLUDING NOW)?: NO

## 2025-04-17 SDOH — ECONOMIC STABILITY: TRANSPORTATION INSECURITY: IN THE PAST 12 MONTHS, HAS LACK OF TRANSPORTATION KEPT YOU FROM MEDICAL APPOINTMENTS OR FROM GETTING MEDICATIONS?: NO

## 2025-04-17 SDOH — ECONOMIC STABILITY: HOUSING INSECURITY: IN THE LAST 12 MONTHS, WAS THERE A TIME WHEN YOU WERE NOT ABLE TO PAY THE MORTGAGE OR RENT ON TIME?: NO

## 2025-04-17 SDOH — SOCIAL STABILITY: SOCIAL INSECURITY: HAS ANYONE EVER THREATENED TO HURT YOUR FAMILY OR YOUR PETS?: NO

## 2025-04-17 SDOH — SOCIAL STABILITY: SOCIAL INSECURITY: DO YOU FEEL UNSAFE GOING BACK TO THE PLACE WHERE YOU ARE LIVING?: NO

## 2025-04-17 SDOH — ECONOMIC STABILITY: FOOD INSECURITY: HOW HARD IS IT FOR YOU TO PAY FOR THE VERY BASICS LIKE FOOD, HOUSING, MEDICAL CARE, AND HEATING?: NOT HARD AT ALL

## 2025-04-17 SDOH — ECONOMIC STABILITY: INCOME INSECURITY: IN THE PAST 12 MONTHS HAS THE ELECTRIC, GAS, OIL, OR WATER COMPANY THREATENED TO SHUT OFF SERVICES IN YOUR HOME?: NO

## 2025-04-17 SDOH — SOCIAL STABILITY: SOCIAL INSECURITY: WERE YOU ABLE TO COMPLETE ALL THE BEHAVIORAL HEALTH SCREENINGS?: YES

## 2025-04-17 SDOH — SOCIAL STABILITY: SOCIAL INSECURITY: DO YOU FEEL ANYONE HAS EXPLOITED OR TAKEN ADVANTAGE OF YOU FINANCIALLY OR OF YOUR PERSONAL PROPERTY?: NO

## 2025-04-17 SDOH — HEALTH STABILITY: MENTAL HEALTH: HOW MANY DRINKS CONTAINING ALCOHOL DO YOU HAVE ON A TYPICAL DAY WHEN YOU ARE DRINKING?: PATIENT DOES NOT DRINK

## 2025-04-17 SDOH — HEALTH STABILITY: MENTAL HEALTH: HOW OFTEN DO YOU HAVE A DRINK CONTAINING ALCOHOL?: NEVER

## 2025-04-17 SDOH — SOCIAL STABILITY: SOCIAL INSECURITY: HAVE YOU HAD THOUGHTS OF HARMING ANYONE ELSE?: NO

## 2025-04-17 SDOH — SOCIAL STABILITY: SOCIAL INSECURITY: ARE THERE ANY APPARENT SIGNS OF INJURIES/BEHAVIORS THAT COULD BE RELATED TO ABUSE/NEGLECT?: NO

## 2025-04-17 SDOH — ECONOMIC STABILITY: HOUSING INSECURITY: IN THE PAST 12 MONTHS, HOW MANY TIMES HAVE YOU MOVED WHERE YOU WERE LIVING?: 0

## 2025-04-17 SDOH — SOCIAL STABILITY: SOCIAL INSECURITY: HAVE YOU HAD ANY THOUGHTS OF HARMING ANYONE ELSE?: NO

## 2025-04-17 SDOH — SOCIAL STABILITY: SOCIAL INSECURITY: DOES ANYONE TRY TO KEEP YOU FROM HAVING/CONTACTING OTHER FRIENDS OR DOING THINGS OUTSIDE YOUR HOME?: NO

## 2025-04-17 SDOH — SOCIAL STABILITY: SOCIAL INSECURITY: ARE YOU OR HAVE YOU BEEN THREATENED OR ABUSED PHYSICALLY, EMOTIONALLY, OR SEXUALLY BY ANYONE?: NO

## 2025-04-17 ASSESSMENT — COGNITIVE AND FUNCTIONAL STATUS - GENERAL
DAILY ACTIVITIY SCORE: 24
MOBILITY SCORE: 24
PATIENT BASELINE BEDBOUND: NO

## 2025-04-17 ASSESSMENT — PAIN DESCRIPTION - DESCRIPTORS
DESCRIPTORS: ACHING
DESCRIPTORS: ACHING
DESCRIPTORS: SHARP
DESCRIPTORS: ACHING
DESCRIPTORS: SHARP

## 2025-04-17 ASSESSMENT — PAIN SCALES - GENERAL
PAINLEVEL_OUTOF10: 5 - MODERATE PAIN
PAINLEVEL_OUTOF10: 0 - NO PAIN
PAINLEVEL_OUTOF10: 5 - MODERATE PAIN
PAINLEVEL_OUTOF10: 10 - WORST POSSIBLE PAIN
PAINLEVEL_OUTOF10: 6
PAINLEVEL_OUTOF10: 0 - NO PAIN
PAINLEVEL_OUTOF10: 9
PAINLEVEL_OUTOF10: 6
PAINLEVEL_OUTOF10: 10 - WORST POSSIBLE PAIN

## 2025-04-17 ASSESSMENT — ACTIVITIES OF DAILY LIVING (ADL)
ADEQUATE_TO_COMPLETE_ADL: YES
WALKS IN HOME: INDEPENDENT
LACK_OF_TRANSPORTATION: NO
FEEDING YOURSELF: INDEPENDENT
EFFECT OF PAIN ON DAILY ACTIVITIES: UNABLE TO GET COMFORTABLE
HEARING - LEFT EAR: FUNCTIONAL
TOILETING: INDEPENDENT
EFFECT OF PAIN ON DAILY ACTIVITIES: UNABLE TO REST
DRESSING YOURSELF: INDEPENDENT
JUDGMENT_ADEQUATE_SAFELY_COMPLETE_DAILY_ACTIVITIES: YES
BATHING: INDEPENDENT
HEARING - RIGHT EAR: FUNCTIONAL
GROOMING: INDEPENDENT
PATIENT'S MEMORY ADEQUATE TO SAFELY COMPLETE DAILY ACTIVITIES?: YES

## 2025-04-17 ASSESSMENT — LIFESTYLE VARIABLES
SKIP TO QUESTIONS 9-10: 1
HAVE YOU EVER FELT YOU SHOULD CUT DOWN ON YOUR DRINKING: NO
HOW MANY STANDARD DRINKS CONTAINING ALCOHOL DO YOU HAVE ON A TYPICAL DAY: PATIENT DOES NOT DRINK
HOW OFTEN DO YOU HAVE A DRINK CONTAINING ALCOHOL: NEVER
HOW OFTEN DO YOU HAVE 6 OR MORE DRINKS ON ONE OCCASION: NEVER
HAVE PEOPLE ANNOYED YOU BY CRITICIZING YOUR DRINKING: NO
EVER FELT BAD OR GUILTY ABOUT YOUR DRINKING: NO
AUDIT-C TOTAL SCORE: 0
AUDIT-C TOTAL SCORE: 0
SKIP TO QUESTIONS 9-10: 1
TOTAL SCORE: 0
EVER HAD A DRINK FIRST THING IN THE MORNING TO STEADY YOUR NERVES TO GET RID OF A HANGOVER: NO
AUDIT-C TOTAL SCORE: 0

## 2025-04-17 ASSESSMENT — PATIENT HEALTH QUESTIONNAIRE - PHQ9
2. FEELING DOWN, DEPRESSED OR HOPELESS: NOT AT ALL
SUM OF ALL RESPONSES TO PHQ9 QUESTIONS 1 & 2: 0
1. LITTLE INTEREST OR PLEASURE IN DOING THINGS: NOT AT ALL

## 2025-04-17 ASSESSMENT — PAIN DESCRIPTION - ORIENTATION
ORIENTATION: RIGHT

## 2025-04-17 ASSESSMENT — PAIN DESCRIPTION - LOCATION
LOCATION: GROIN
LOCATION: OTHER (COMMENT)
LOCATION: GROIN

## 2025-04-17 NOTE — POST-PROCEDURE NOTE
..Report to Receiving RN:    Report To: Chelle PETERSON RN  Time Report Called: 1630  Hand-Off Communication: patient only received 33mins dialysis post /84, HR 75  Complications During Treatment: Yes, catheter was only able to run at 200 BFR there was increased arterial blood pressure, catheter wouldn't run regular or reversed, physician aware.  Ultrafiltration Treatment: No  Medications Administered During Dialysis: No  Blood Products Administered During Dialysis: No  Labs Sent During Dialysis: No  Heparin Drip Rate Changes: N/A  Dialysis Catheter Dressing: clean, dry, and intact, new dressing applied.  Last Dressing Change: 4/17/25    Electronic Signatures:   (Signed )   Authored:    (Signed )   Authored:     Last Updated: 4:45 PM by GERARD FRANKLIN

## 2025-04-17 NOTE — CONSULTS
Reason For Consult  End-stage renal disease on hemodialysis    History Of Present Illness  Batsheva Page is a 49 y.o. female with past medical history of end-stage renal disease on hemodialysis, hypertension who presented to the hospital with erythema, tenderness and drainage from her femoral tunneled dialysis catheter site.  The patient denies any other symptoms other than anxiety.  She was found to have potassium of 8.2 even though she was dialyzed yesterday.  She was given hyperkalemia treatment and stat dialysis orders were placed and she was taken to dialysis right away however her dialysis catheter is not functioning at this time.  We are consulted for management of end-stage renal disease.     Past Medical History  She has a past medical history of Aortic valve replaced (2022), CHF (congestive heart failure), Chronic pain, Coronary artery disease, Disease of thyroid gland, ESRD (end stage renal disease) (Multi), H/O mitral valve replacement (2013), Heart disease, History of transfusion, Hypertension, Mitral valve regurgitation, Seizures (Multi), and Stroke (Multi).    Surgical History  She has a past surgical history that includes IR CVC tunneled (09/09/2022); IR CVC tunneled (12/28/2022); US guided percutaneous placement (07/14/2022); Parathyroidectomy; Coronary artery bypass graft; Aortic valve replacement (09/26/2022); Mitral valve replacement (09/26/2022); Mitral valve repair (2013); Tricuspid valvuloplasty (2013); IR CVC (01/12/2024); IR CVC (05/07/2024); and IR CVC (08/20/2024).     Social History  She reports that she has never smoked. She has never used smokeless tobacco. She reports that she does not drink alcohol and does not use drugs.    Family History  Family History[1]     Allergies  Kayexalate, Metoclopramide hcl, Prochlorperazine, Zofran [ondansetron hcl], and Ondansetron    Review of Systems  10 point review of systems was obtained and is negative other than what is indicated above in  "the HPI     Physical Exam  General: Awake, alert, no acute distress  Head/ears/nose/throat:  Normocephalic, atraumatic  Neck:  No jugular venous distention, neck supple  Respiratory:  Clear to auscultation bilaterally, normal respiratory effort  Cardiovascular:  S1 and S2, no rubs  Gastrointestinal:  Soft, positive bowel sounds, no rebound or guarding  Extremities:  Mild edema and erythema of lower extremities. Has R femoral tunneled HD catheter  Musculoskeletal:  No injury or deformity noted  Neurologic:  Alert, responsive, cooperative with exam  Psychiatric: anxious mood and affect         I&O 24HR    Intake/Output Summary (Last 24 hours) at 4/17/2025 1650  Last data filed at 4/17/2025 1634  Gross per 24 hour   Intake 600 ml   Output 267 ml   Net 333 ml       Vitals 24HR  Heart Rate:  [71-76]   Temp:  [36.3 °C (97.3 °F)-36.5 °C (97.7 °F)]   Resp:  [17]   BP: (120-182)/(69-99)   Height:  [165.1 cm (5' 5\")]   Weight:  [67 kg (147 lb 11.3 oz)]   SpO2:  [100 %]       Relevant Results  Results for orders placed or performed during the hospital encounter of 04/17/25 (from the past 24 hours)   CBC and Auto Differential   Result Value Ref Range    WBC 5.8 4.4 - 11.3 x10*3/uL    nRBC 0.0 0.0 - 0.0 /100 WBCs    RBC 3.60 (L) 4.00 - 5.20 x10*6/uL    Hemoglobin 11.1 (L) 12.0 - 16.0 g/dL    Hematocrit 35.4 (L) 36.0 - 46.0 %    MCV 98 80 - 100 fL    MCH 30.8 26.0 - 34.0 pg    MCHC 31.4 (L) 32.0 - 36.0 g/dL    RDW 16.5 (H) 11.5 - 14.5 %    Platelets 166 150 - 450 x10*3/uL    Neutrophils % 64.6 40.0 - 80.0 %    Immature Granulocytes %, Automated 0.2 0.0 - 0.9 %    Lymphocytes % 18.7 13.0 - 44.0 %    Monocytes % 6.2 2.0 - 10.0 %    Eosinophils % 9.6 0.0 - 6.0 %    Basophils % 0.7 0.0 - 2.0 %    Neutrophils Absolute 3.77 1.20 - 7.70 x10*3/uL    Immature Granulocytes Absolute, Automated 0.01 0.00 - 0.70 x10*3/uL    Lymphocytes Absolute 1.09 (L) 1.20 - 4.80 x10*3/uL    Monocytes Absolute 0.36 0.10 - 1.00 x10*3/uL    Eosinophils " Absolute 0.56 0.00 - 0.70 x10*3/uL    Basophils Absolute 0.04 0.00 - 0.10 x10*3/uL   Comprehensive metabolic panel   Result Value Ref Range    Glucose 82 74 - 99 mg/dL    Sodium 131 (L) 136 - 145 mmol/L    Potassium 8.2 (HH) 3.5 - 5.3 mmol/L    Chloride 90 (L) 98 - 107 mmol/L    Bicarbonate 30 21 - 32 mmol/L    Anion Gap 19 10 - 20 mmol/L    Urea Nitrogen 41 (H) 6 - 23 mg/dL    Creatinine 6.94 (H) 0.50 - 1.05 mg/dL    eGFR 7 (L) >60 mL/min/1.73m*2    Calcium 7.2 (L) 8.6 - 10.3 mg/dL    Albumin 3.8 3.4 - 5.0 g/dL    Alkaline Phosphatase 68 33 - 110 U/L    Total Protein 8.6 (H) 6.4 - 8.2 g/dL    AST 17 9 - 39 U/L    Bilirubin, Total 0.5 0.0 - 1.2 mg/dL    ALT 9 7 - 45 U/L   Lactate   Result Value Ref Range    Lactate 0.6 0.4 - 2.0 mmol/L   Basic metabolic panel   Result Value Ref Range    Glucose 85 74 - 99 mg/dL    Sodium 132 (L) 136 - 145 mmol/L    Potassium 7.6 (HH) 3.5 - 5.3 mmol/L    Chloride 92 (L) 98 - 107 mmol/L    Bicarbonate 29 21 - 32 mmol/L    Anion Gap 19 10 - 20 mmol/L    Urea Nitrogen 41 (H) 6 - 23 mg/dL    Creatinine 6.95 (H) 0.50 - 1.05 mg/dL    eGFR 7 (L) >60 mL/min/1.73m*2    Calcium 6.9 (L) 8.6 - 10.3 mg/dL   POCT GLUCOSE   Result Value Ref Range    POCT Glucose 82 74 - 99 mg/dL          Assessment & Plan  Right foot infection    Hyperkalemia    End-stage renal disease on hemodialysis  Hyperkalemia  Infection of dialysis catheter site and malfunctioning dialysis catheter    Plan: Was treated with IV calcium gluconate, dextrose and insulin and she was put on for stat dialysis with a 2K bath however her dialysis catheter is not functioning.  Because of her multiple vascular issues and difficulties in the past with dialysis catheter placement, usually her dialysis catheter issues are handled by IR.  I spoke to Dr. Maurer in the ICU who is going to coordinate stat interventional radiology consult to hopefully get her dialysis catheter exchanged at this time and then she will be admitted to the ICU for  emergent dialysis today.  Monitor culture results.  Will need infectious disease consult.  Renal diet.  Check renal function panel again 1 hour after dialysis complete.  Thank you for your consultation.    Vu Pedersen MD         [1]   Family History  Problem Relation Name Age of Onset    No Known Problems Mother      No Known Problems Father

## 2025-04-17 NOTE — Clinical Note
Letter to patient to inform them of Bloodwork that need to be completed before next visit. Patient ID band present and verified.

## 2025-04-17 NOTE — ED TRIAGE NOTES
Pt states that her dialysis catheter may be infected, pt states the site is hurting, its red, and she notices a drainage

## 2025-04-17 NOTE — PRE-PROCEDURE NOTE
..Report from Sending RN:    Report From: Neha PABLO RN  Recent Surgery of Procedure: No  Baseline Level of Consciousness (LOC): A&Ox4  Oxygen Use: No  Type: room air  Diabetic: Yes  Last BP Med Given Day of Dialysis: see MAR  Last Pain Med Given: see MAR  Lab Tests to be Obtained with Dialysis: No  Blood Transfusion to be Given During Dialysis: No  Available IV Access: Yes  Medications to be Administered During Dialysis: No  Continuous IV Infusion Running: No  Restraints on Currently or in the Last 24 Hours: No  Hand-Off Communication: pt is stable for STAT dialysis treatment  Dialysis Catheter Dressing: will access upon arrival  Last Dressing Change: will access upon arrival

## 2025-04-17 NOTE — ED PROVIDER NOTES
HPI   Chief Complaint   Patient presents with    Vascular Access Problem       HPI  Patient is a 49-year-old female who presents to ED for chief complaint of possible line infection in the right femoral region.  Patient has a dialysis catheter which has purulent drainage and erythema around the insertion site.  Patient denies any fever or chills, nausea or vomiting, chest pain or shortness of breath.  She states she gets dialysis Monday Wednesday Friday and was able to get a session yesterday and received a full session.  She denies any other acute complaints.      Patient History   Medical History[1]  Surgical History[2]  Family History[3]  Social History[4]    Physical Exam   ED Triage Vitals [04/17/25 1226]   Temperature Heart Rate Respirations BP   36.5 °C (97.7 °F) 76 17 157/88      Pulse Ox Temp Source Heart Rate Source Patient Position   100 % Temporal -- Sitting      BP Location FiO2 (%)     Left arm --       Physical Exam  Skin:     Findings: Erythema (See attached photo) present.     Vitals reviewed.   Constitutional:       General: She is not in acute distress.     Appearance: Normal appearance. She is not ill-appearing.   HENT:      Head: Normocephalic and atraumatic.   Eyes:      Extraocular Movements: Extraocular movements intact.   Cardiovascular:      Rate and Rhythm: Normal rate and regular rhythm.      Heart sounds: Normal heart sounds.   Pulmonary:      Effort: Pulmonary effort is normal.      Breath sounds: Normal breath sounds.   Abdominal:      Palpations: Abdomen is soft.      Tenderness: There is no abdominal tenderness.   Musculoskeletal:         General: Normal range of motion.      Cervical back: Normal range of motion and neck supple.   Skin:     General: Skin is warm and dry.   Neurological:      General: No focal deficit present.      Mental Status: She is alert and oriented to person, place, and time.   Psychiatric:         Mood and Affect: Mood normal.         Behavior: Behavior  normal.        ED Course & MDM   ED Course as of 04/19/25 1922   Thu Apr 17, 2025   1455 EKG personally interpreted by me performed at 1435  Normal sinus rhythm with ventricular rate 73  normal axis slight hyperacute T waves no acute ischemic changes [EF]      ED Course User Index  [EF] Regla Spaulding,          Diagnoses as of 04/19/25 1922   Hyperkalemia   ESRD (end stage renal disease) (Multi)   Complication associated with dialysis catheter                 No data recorded                       Parts of this chart have been completed using voice recognition software. Please excuse any errors of transcription.  My thought process and reason for plan has been formulated from the time that I saw the patient until the time of disposition and is not specific to one specific moment during their visit and furthermore my MDM encompasses this entire chart and not only this text box.    HPI:   A medically appropriate HPI was obtained, outlined above.    Batsheva Page is a  49 y.o. female    Chief Complaint   Patient presents with    Vascular Access Problem       Medical History[5]    Surgical History[6]    Social History[7]    Family History[8]    Allergies[9]    Current Outpatient Medications   Medication Instructions    acetaminophen (Tylenol) 325 mg tablet Take 2 tablets (650 mg) by mouth every 6 hours as needed for mild pain (1 - 3).    aspirin 81 mg, oral, Daily    atorvastatin (LIPITOR) 40 mg, Daily    calcitriol (ROCALTROL) 0.5 mcg, Daily    cloNIDine (CATAPRES) 0.1 mg, oral, Every 8 hours scheduled    epoetin brandy-epbx (RETACRIT) 100 Units/kg, subcutaneous, 3 times weekly    ergocalciferol (Vitamin D-2) 1.25 MG (00305 UT) capsule 1 capsule, Once Weekly    hydrOXYzine HCL (ATARAX) 25 mg, oral, Every 6 hours PRN    levETIRAcetam (KEPPRA) 750 mg, Nightly    methocarbamol (ROBAXIN) 750 mg, oral, Every 6 hours PRN    metoprolol tartrate (LOPRESSOR) 25 mg, oral, 2 times daily    oxyCODONE-acetaminophen  (Percocet) 5-325 mg tablet Take 1 tablet by mouth every 6 hours as needed for moderate pain (4 - 6).    pregabalin (LYRICA) 50 mg, oral, 2 times daily    promethazine (PHENERGAN) 25 mg, Daily PRN   for details    Exam:   Patient Vitals for the past 24 hrs:   BP Temp Temp src Pulse Resp SpO2 Weight   04/19/25 1900 144/87 36.8 °C (98.2 °F) Oral -- 17 99 % --   04/19/25 1641 140/77 36.6 °C (97.9 °F) Oral 68 18 96 % --   04/19/25 1302 (!) 140/114 36.5 °C (97.7 °F) Oral 84 19 100 % --   04/19/25 1225 (!) 189/93 36.4 °C (97.5 °F) Temporal 72 -- -- --   04/19/25 1145 (!) 183/91 -- -- 59 -- -- --   04/19/25 1115 164/90 -- -- 60 -- -- --   04/19/25 1045 (!) 178/96 -- -- 58 -- -- --   04/19/25 1015 160/87 -- -- -- -- -- --   04/19/25 0945 146/85 -- -- 62 -- -- --   04/19/25 0915 (!) 156/97 -- -- 64 -- -- --   04/19/25 0900 -- 36.2 °C (97.2 °F) Temporal 68 -- -- --   04/19/25 0830 138/77 36.6 °C (97.9 °F) Oral 63 17 99 % --   04/19/25 0600 -- -- -- -- -- -- 71 kg (156 lb 8.4 oz)   04/19/25 0307 165/80 36.5 °C (97.7 °F) Oral -- 16 100 % --   04/19/25 0035 143/76 36.6 °C (97.9 °F) Oral -- 16 100 % --       A medically appropriate exam performed, outlined above, given the known history and presentation.    EKG/Cardiac monitor:   If EKG was done and, it was interpreted by attending physician, see their note for ED course for more detail.    Medications given during visit:  Medications   sodium zirconium cyclosilicate (Lokelma) packet 10 g (10 g oral Not Given 4/19/25 0903)   aspirin EC tablet 81 mg (81 mg oral Not Given 4/19/25 0903)   atorvastatin (Lipitor) tablet 40 mg (40 mg oral Not Given 4/19/25 0903)   calcitriol (Rocaltrol) capsule 0.5 mcg (0.5 mcg oral Not Given 4/19/25 0903)   ergocalciferol (Vitamin D-2) capsule 1.25 mg ( oral MAR Unhold 4/18/25 1635)   hydrOXYzine HCL (Atarax) tablet 25 mg ( oral MAR Unhold 4/18/25 1635)   levETIRAcetam (Keppra) tablet 750 mg ( oral Not Given 4/19/25 2110)   methocarbamol (Robaxin)  tablet 750 mg ( oral Held by provider 4/17/25 1704)   metoprolol tartrate (Lopressor) tablet 25 mg (25 mg oral Given 4/19/25 2110)   pregabalin (Lyrica) capsule 50 mg (50 mg oral Not Given 4/19/25 2110)   promethazine (Phenergan) tablet 25 mg ( oral MAR Unhold 4/18/25 1635)   epoetin brandy-epbx (Retacrit) injection 6,440 Units (6,440 Units subcutaneous Given 4/18/25 1850)   vancomycin (Vancocin) pharmacy to dose - pharmacy monitoring (has no administration in time range)   piperacillin-tazobactam (Zosyn) 2.25 g in dextrose (iso) IV 50 mL (2.25 g intravenous New Bag 4/19/25 2111)   cloNIDine (Catapres) tablet 0.1 mg (0.1 mg oral Given 4/19/25 2110)   oxyCODONE-acetaminophen (Percocet)  mg per tablet 1 tablet ( oral MAR Unhold 4/18/25 1636)   oxyCODONE-acetaminophen (Percocet) 5-325 mg per tablet 1 tablet ( oral MAR Unhold 4/18/25 1636)   acetaminophen (Tylenol) tablet 650 mg ( oral MAR Unhold 4/18/25 1723)   HYDROmorphone (Dilaudid) injection 0.6 mg (0.6 mg intravenous Given 4/19/25 1930)   diphenhydrAMINE (BENADryl) injection 25 mg (25 mg intravenous Given 4/19/25 1933)   sodium chloride 0.9 % bolus 200 mL (has no administration in time range)   heparin 1,000 unit/mL injection 1,000 Units (has no administration in time range)   heparin 1,000 unit/mL injection 1,000 Units (1,000 Units intra-catheter Not Given 4/19/25 1101)   mirtazapine (Remeron) tablet 7.5 mg (7.5 mg oral Given 4/19/25 2110)   oxyCODONE-acetaminophen (Percocet) 5-325 mg per tablet 2 tablet (2 tablets oral Given 4/17/25 1324)   piperacillin-tazobactam (Zosyn) 4.5 g in dextrose (iso)  mL (0 g intravenous Stopped 4/17/25 1720)   diphenhydrAMINE (BENADryl) injection 25 mg (25 mg intravenous Given 4/17/25 1438)   HYDROmorphone (Dilaudid) injection 1 mg (1 mg intravenous Given 4/17/25 1438)   calcium gluconate 2 g in sodium chloride (iso)  mL (0 g intravenous Stopped 4/17/25 1831)   diphenhydrAMINE (BENADryl) injection 50 mg (50 mg  intravenous Given 4/17/25 1747)   insulin regular (HumuLIN R,NovoLIN R) injection 10 Units (10 Units intravenous Given 4/17/25 1723)     Followed by   dextrose 50 % injection 25 g (25 g intravenous Given 4/17/25 1741)   sodium bicarbonate 8.4 % (1 mEq/mL) 50 mEq (50 mEq intravenous Given 4/17/25 1724)   midazolam (Versed) injection 2 mg (2 mg intravenous Given 4/17/25 1957)   midazolam (Versed) injection 2 mg (2 mg intravenous Given 4/17/25 2009)   fentaNYL PF (Sublimaze) injection 50 mcg (50 mcg intravenous Given 4/17/25 2016)   midazolam (Versed) injection 2 mg (2 mg intravenous Given 4/17/25 2030)   lidocaine (Xylocaine) 10 mg/mL (1 %) injection 50 mg (50 mg infiltration Given 4/17/25 2050)   diphenhydrAMINE (BENADryl) injection 50 mg (50 mg intravenous Given 4/17/25 2143)   vancomycin (Xellia) 1,750 mg in diluent combination  mL (0 mg intravenous Stopped 4/18/25 0234)   promethazine (Phenergan) 12.5 mg in sodium chloride 0.9% 50 mL IV (12.5 mg intravenous Given 4/18/25 1300)   vancomycin (Xellia) 750 mg in diluent combination  mL (0 mg intravenous Stopped 4/19/25 1758)   diphenhydrAMINE (BENADryl) injection 25 mg (25 mg intravenous Given 4/19/25 0916)        Diagnostic/tests:  Labs Reviewed   STAPHYLOCOCCUS AUREUS/MRSA COLONIZATION, CULTURE - Abnormal       Result Value    Staph/MRSA Screen Culture   (*)     Value: Isolated: Methicillin Susceptible Staphylococcus aureus (MSSA)   CBC WITH AUTO DIFFERENTIAL - Abnormal    WBC 5.8      nRBC 0.0      RBC 3.60 (*)     Hemoglobin 11.1 (*)     Hematocrit 35.4 (*)     MCV 98      MCH 30.8      MCHC 31.4 (*)     RDW 16.5 (*)     Platelets 166      Neutrophils % 64.6      Immature Granulocytes %, Automated 0.2      Lymphocytes % 18.7      Monocytes % 6.2      Eosinophils % 9.6      Basophils % 0.7      Neutrophils Absolute 3.77      Immature Granulocytes Absolute, Automated 0.01      Lymphocytes Absolute 1.09 (*)     Monocytes Absolute 0.36      Eosinophils  Absolute 0.56      Basophils Absolute 0.04     COMPREHENSIVE METABOLIC PANEL - Abnormal    Glucose 82      Sodium 131 (*)     Potassium 8.2 (*)     Chloride 90 (*)     Bicarbonate 30      Anion Gap 19      Urea Nitrogen 41 (*)     Creatinine 6.94 (*)     eGFR 7 (*)     Calcium 7.2 (*)     Albumin 3.8      Alkaline Phosphatase 68      Total Protein 8.6 (*)     AST 17      Bilirubin, Total 0.5      ALT 9     BASIC METABOLIC PANEL - Abnormal    Glucose 85      Sodium 132 (*)     Potassium 7.6 (*)     Chloride 92 (*)     Bicarbonate 29      Anion Gap 19      Urea Nitrogen 41 (*)     Creatinine 6.95 (*)     eGFR 7 (*)     Calcium 6.9 (*)    CBC - Abnormal    WBC 5.4      nRBC 0.0      RBC 3.25 (*)     Hemoglobin 10.0 (*)     Hematocrit 31.8 (*)     MCV 98      MCH 30.8      MCHC 31.4 (*)     RDW 16.3 (*)     Platelets 129 (*)    RENAL FUNCTION PANEL - Abnormal    Glucose 117 (*)     Sodium 133 (*)     Potassium 4.2      Chloride 94 (*)     Bicarbonate 29      Anion Gap 14      Urea Nitrogen 9      Creatinine 2.18 (*)     eGFR 27 (*)     Calcium 7.8 (*)     Phosphorus 2.5      Albumin 3.3 (*)    CBC - Abnormal    WBC 4.9      nRBC 0.0      RBC 3.17 (*)     Hemoglobin 9.7 (*)     Hematocrit 31.5 (*)     MCV 99      MCH 30.6      MCHC 30.8 (*)     RDW 16.7 (*)     Platelets 129 (*)    RENAL FUNCTION PANEL - Abnormal    Glucose 84      Sodium 135 (*)     Potassium 6.1 (*)     Chloride 97 (*)     Bicarbonate 26      Anion Gap 18      Urea Nitrogen 26 (*)     Creatinine 4.72 (*)     eGFR 11 (*)     Calcium 6.8 (*)     Phosphorus 5.2 (*)     Albumin 3.1 (*)    BLOOD CULTURE - Normal    Blood Culture No growth at 2 days     BLOOD CULTURE - Normal    Blood Culture No growth at 2 days     LACTATE - Normal    Lactate 0.6      Narrative:     Venipuncture immediately after or during the administration of Metamizole may lead to falsely low results. Testing should be performed immediately prior to Metamizole dosing.   MAGNESIUM -  Normal    Magnesium 1.62     MAGNESIUM - Normal    Magnesium 1.73     POCT GLUCOSE - Normal    POCT Glucose 82     CBC   RENAL FUNCTION PANEL   MAGNESIUM   POCT GLUCOSE METER        Consult to Interventional Radiology    (Results Pending)          MDM Summary:  CMP shows potassium of 8.2, hyperkalemia protocol was initiated and nephrology was consulted, emergent dialysis orders were placed, ICU and general surgery were consulted.  Patient was admitted for further management.  Lab work otherwise shows no significant leukocytosis.  Patient's vital signs are within normal limits.          Medical Decision Making      Procedure  Critical Care    Performed by: John Garcia PA-C  Authorized by: Regla Spaulding DO    Critical care provider statement:     Critical care time (minutes):  45    Critical care time was exclusive of:  Separately billable procedures and treating other patients    Critical care was necessary to treat or prevent imminent or life-threatening deterioration of the following conditions:  Metabolic crisis    Critical care was time spent personally by me on the following activities:  Development of treatment plan with patient or surrogate, discussions with consultants, evaluation of patient's response to treatment, examination of patient, obtaining history from patient or surrogate, ordering and performing treatments and interventions, ordering and review of laboratory studies, ordering and review of radiographic studies, re-evaluation of patient's condition and review of old charts    Care discussed with: admitting provider             [1]   Past Medical History:  Diagnosis Date    Aortic valve replaced 2022    CHF (congestive heart failure)     Chronic pain     Coronary artery disease     Disease of thyroid gland     ESRD (end stage renal disease) (Multi)     H/O mitral valve replacement 2013    and 2022    Heart disease     History of transfusion     Hypertension     Mitral valve regurgitation      Seizures (Multi)     Stroke (Multi)    [2]   Past Surgical History:  Procedure Laterality Date    AORTIC VALVE REPLACEMENT  09/26/2022    bioprosthetic    CORONARY ARTERY BYPASS GRAFT      IR CVC  01/12/2024    exchange    IR CVC  05/07/2024    exchange    IR CVC  08/20/2024    removal    IR CVC TUNNELED  09/09/2022    IR CVC TUNNELED 9/9/2022 [facility] [place] [facility]    IR CVC TUNNELED  12/28/2022    IR CVC TUNNELED 12/28/2022 [facility] [place] [facility]    MITRAL VALVE REPAIR  2013    MITRAL VALVE REPLACEMENT  09/26/2022    bioprosthetic    PARATHYROIDECTOMY      TRICUSPID VALVULOPLASTY  2013    US GUIDED PERCUTANEOUS PLACEMENT  07/14/2022    US GUIDED PERCUTANEOUS PLACEMENT [facility]   [3]   Family History  Problem Relation Name Age of Onset    No Known Problems Mother      No Known Problems Father     [4]   Social History  Tobacco Use    Smoking status: Never    Smokeless tobacco: Never   Vaping Use    Vaping status: Never Used   Substance Use Topics    Alcohol use: Never    Drug use: Never   [5]   Past Medical History:  Diagnosis Date    Aortic valve replaced 2022    CHF (congestive heart failure)     Chronic pain     Coronary artery disease     Disease of thyroid gland     ESRD (end stage renal disease) (Multi)     H/O mitral valve replacement 2013    and 2022    Heart disease     History of transfusion     Hypertension     Mitral valve regurgitation     Seizures (Multi)     Stroke (Multi)    [6]   Past Surgical History:  Procedure Laterality Date    AORTIC VALVE REPLACEMENT  09/26/2022    bioprosthetic    CORONARY ARTERY BYPASS GRAFT      IR CVC  01/12/2024    exchange    IR CVC  05/07/2024    exchange    IR CVC  08/20/2024    removal    IR CVC TUNNELED  09/09/2022    IR CVC TUNNELED 9/9/2022 [facility] [place] [facility]    IR CVC TUNNELED  12/28/2022    IR CVC TUNNELED 12/28/2022 [facility] [place] [facility]    MITRAL VALVE REPAIR  2013    MITRAL VALVE REPLACEMENT  09/26/2022    bioprosthetic    PARATHYROIDECTOMY       "TRICUSPID VALVULOPLASTY  2013    US GUIDED PERCUTANEOUS PLACEMENT  07/14/2022    US GUIDED PERCUTANEOUS PLACEMENT [facility]   [7]   Social History  Tobacco Use    Smoking status: Never    Smokeless tobacco: Never   Vaping Use    Vaping status: Never Used   Substance Use Topics    Alcohol use: Never    Drug use: Never   [8]   Family History  Problem Relation Name Age of Onset    No Known Problems Mother      No Known Problems Father     [9]   Allergies  Allergen Reactions    Kayexalate Seizure    Metoclopramide Hcl Other     anxious, agitated, \"skin crawl    Prochlorperazine Other     anxious, agitated, \"skin crawl    Zofran [Ondansetron Hcl] Headache    Ondansetron Other and Headache        John Garcia PA-C  04/19/25 2135    "

## 2025-04-18 LAB
ALBUMIN SERPL BCP-MCNC: 3.3 G/DL (ref 3.4–5)
ANION GAP SERPL CALCULATED.3IONS-SCNC: 14 MMOL/L (ref 10–20)
ATRIAL RATE: 73 BPM
BUN SERPL-MCNC: 9 MG/DL (ref 6–23)
CALCIUM SERPL-MCNC: 7.8 MG/DL (ref 8.6–10.3)
CHLORIDE SERPL-SCNC: 94 MMOL/L (ref 98–107)
CO2 SERPL-SCNC: 29 MMOL/L (ref 21–32)
CREAT SERPL-MCNC: 2.18 MG/DL (ref 0.5–1.05)
EGFRCR SERPLBLD CKD-EPI 2021: 27 ML/MIN/1.73M*2
ERYTHROCYTE [DISTWIDTH] IN BLOOD BY AUTOMATED COUNT: 16.3 % (ref 11.5–14.5)
GLUCOSE SERPL-MCNC: 117 MG/DL (ref 74–99)
HCT VFR BLD AUTO: 31.8 % (ref 36–46)
HGB BLD-MCNC: 10 G/DL (ref 12–16)
MAGNESIUM SERPL-MCNC: 1.62 MG/DL (ref 1.6–2.4)
MCH RBC QN AUTO: 30.8 PG (ref 26–34)
MCHC RBC AUTO-ENTMCNC: 31.4 G/DL (ref 32–36)
MCV RBC AUTO: 98 FL (ref 80–100)
NRBC BLD-RTO: 0 /100 WBCS (ref 0–0)
P AXIS: 55 DEGREES
P OFFSET: 178 MS
P ONSET: 122 MS
PHOSPHATE SERPL-MCNC: 2.5 MG/DL (ref 2.5–4.9)
PLATELET # BLD AUTO: 129 X10*3/UL (ref 150–450)
POTASSIUM SERPL-SCNC: 4.2 MMOL/L (ref 3.5–5.3)
PR INTERVAL: 202 MS
Q ONSET: 223 MS
QRS COUNT: 13 BEATS
QRS DURATION: 74 MS
QT INTERVAL: 392 MS
QTC CALCULATION(BAZETT): 431 MS
QTC FREDERICIA: 418 MS
R AXIS: -12 DEGREES
RBC # BLD AUTO: 3.25 X10*6/UL (ref 4–5.2)
SODIUM SERPL-SCNC: 133 MMOL/L (ref 136–145)
T AXIS: 55 DEGREES
T OFFSET: 419 MS
VENTRICULAR RATE: 73 BPM
WBC # BLD AUTO: 5.4 X10*3/UL (ref 4.4–11.3)

## 2025-04-18 PROCEDURE — 90792 PSYCH DIAG EVAL W/MED SRVCS: CPT

## 2025-04-18 PROCEDURE — 85027 COMPLETE CBC AUTOMATED: CPT

## 2025-04-18 PROCEDURE — 2500000001 HC RX 250 WO HCPCS SELF ADMINISTERED DRUGS (ALT 637 FOR MEDICARE OP): Performed by: NURSE PRACTITIONER

## 2025-04-18 PROCEDURE — 2500000002 HC RX 250 W HCPCS SELF ADMINISTERED DRUGS (ALT 637 FOR MEDICARE OP, ALT 636 FOR OP/ED): Performed by: INTERNAL MEDICINE

## 2025-04-18 PROCEDURE — 2500000004 HC RX 250 GENERAL PHARMACY W/ HCPCS (ALT 636 FOR OP/ED): Mod: JZ | Performed by: SURGERY

## 2025-04-18 PROCEDURE — 2500000001 HC RX 250 WO HCPCS SELF ADMINISTERED DRUGS (ALT 637 FOR MEDICARE OP): Performed by: INTERNAL MEDICINE

## 2025-04-18 PROCEDURE — 2060000001 HC INTERMEDIATE ICU ROOM DAILY

## 2025-04-18 PROCEDURE — 80069 RENAL FUNCTION PANEL: CPT | Performed by: INTERNAL MEDICINE

## 2025-04-18 PROCEDURE — 2500000004 HC RX 250 GENERAL PHARMACY W/ HCPCS (ALT 636 FOR OP/ED): Mod: JZ | Performed by: STUDENT IN AN ORGANIZED HEALTH CARE EDUCATION/TRAINING PROGRAM

## 2025-04-18 PROCEDURE — 2500000004 HC RX 250 GENERAL PHARMACY W/ HCPCS (ALT 636 FOR OP/ED): Mod: JZ | Performed by: NURSE PRACTITIONER

## 2025-04-18 PROCEDURE — 83735 ASSAY OF MAGNESIUM: CPT

## 2025-04-18 PROCEDURE — 2500000004 HC RX 250 GENERAL PHARMACY W/ HCPCS (ALT 636 FOR OP/ED): Performed by: INTERNAL MEDICINE

## 2025-04-18 PROCEDURE — 2500000004 HC RX 250 GENERAL PHARMACY W/ HCPCS (ALT 636 FOR OP/ED): Performed by: NURSE PRACTITIONER

## 2025-04-18 PROCEDURE — 2500000001 HC RX 250 WO HCPCS SELF ADMINISTERED DRUGS (ALT 637 FOR MEDICARE OP)

## 2025-04-18 PROCEDURE — 6350000001 HC RX 635 EPOETIN >10,000 UNITS: Performed by: NURSE PRACTITIONER

## 2025-04-18 PROCEDURE — 2500000004 HC RX 250 GENERAL PHARMACY W/ HCPCS (ALT 636 FOR OP/ED)

## 2025-04-18 PROCEDURE — 8010000001 HC DIALYSIS - HEMODIALYSIS PER DAY

## 2025-04-18 RX ORDER — HEPARIN SODIUM 1000 [USP'U]/ML
1000 INJECTION, SOLUTION INTRAVENOUS; SUBCUTANEOUS
Status: DISCONTINUED | OUTPATIENT
Start: 2025-04-19 | End: 2025-04-20 | Stop reason: SDUPTHER

## 2025-04-18 RX ORDER — HYDROMORPHONE HYDROCHLORIDE 1 MG/ML
0.6 INJECTION, SOLUTION INTRAMUSCULAR; INTRAVENOUS; SUBCUTANEOUS
Status: DISCONTINUED | OUTPATIENT
Start: 2025-04-18 | End: 2025-04-27 | Stop reason: HOSPADM

## 2025-04-18 RX ORDER — ACETAMINOPHEN 325 MG/1
650 TABLET ORAL EVERY 6 HOURS PRN
Status: DISCONTINUED | OUTPATIENT
Start: 2025-04-18 | End: 2025-04-27 | Stop reason: HOSPADM

## 2025-04-18 RX ORDER — VANCOMYCIN 750 MG/150ML
750 INJECTION, SOLUTION INTRAVENOUS ONCE
Status: COMPLETED | OUTPATIENT
Start: 2025-04-19 | End: 2025-04-19

## 2025-04-18 RX ORDER — VANCOMYCIN 750 MG/150ML
750 INJECTION, SOLUTION INTRAVENOUS
Status: DISCONTINUED | OUTPATIENT
Start: 2025-04-19 | End: 2025-04-18

## 2025-04-18 RX ORDER — DIPHENHYDRAMINE HYDROCHLORIDE 50 MG/ML
25 INJECTION, SOLUTION INTRAMUSCULAR; INTRAVENOUS EVERY 6 HOURS PRN
Status: DISCONTINUED | OUTPATIENT
Start: 2025-04-18 | End: 2025-04-18

## 2025-04-18 RX ORDER — DIPHENHYDRAMINE HYDROCHLORIDE 50 MG/ML
25 INJECTION, SOLUTION INTRAMUSCULAR; INTRAVENOUS
Status: DISCONTINUED | OUTPATIENT
Start: 2025-04-18 | End: 2025-04-20

## 2025-04-18 RX ORDER — HYDROMORPHONE HYDROCHLORIDE 0.2 MG/ML
0.2 INJECTION INTRAMUSCULAR; INTRAVENOUS; SUBCUTANEOUS
Status: DISCONTINUED | OUTPATIENT
Start: 2025-04-18 | End: 2025-04-18

## 2025-04-18 RX ORDER — OXYCODONE AND ACETAMINOPHEN 5; 325 MG/1; MG/1
1 TABLET ORAL EVERY 6 HOURS PRN
Refills: 0 | Status: DISCONTINUED | OUTPATIENT
Start: 2025-04-18 | End: 2025-04-27 | Stop reason: HOSPADM

## 2025-04-18 RX ORDER — OXYCODONE AND ACETAMINOPHEN 10; 325 MG/1; MG/1
1 TABLET ORAL EVERY 6 HOURS PRN
Refills: 0 | Status: DISCONTINUED | OUTPATIENT
Start: 2025-04-18 | End: 2025-04-27 | Stop reason: HOSPADM

## 2025-04-18 RX ORDER — MIRTAZAPINE 7.5 MG/1
7.5 TABLET, FILM COATED ORAL NIGHTLY
Status: DISCONTINUED | OUTPATIENT
Start: 2025-04-18 | End: 2025-04-21

## 2025-04-18 RX ADMIN — HYDROMORPHONE HYDROCHLORIDE 0.6 MG: 1 INJECTION, SOLUTION INTRAMUSCULAR; INTRAVENOUS; SUBCUTANEOUS at 14:33

## 2025-04-18 RX ADMIN — HYDROMORPHONE HYDROCHLORIDE 0.2 MG: 0.2 INJECTION, SOLUTION INTRAMUSCULAR; INTRAVENOUS; SUBCUTANEOUS at 06:53

## 2025-04-18 RX ADMIN — PROMETHAZINE HYDROCHLORIDE 12.5 MG: 25 INJECTION INTRAMUSCULAR; INTRAVENOUS at 13:00

## 2025-04-18 RX ADMIN — PREGABALIN 50 MG: 50 CAPSULE ORAL at 21:25

## 2025-04-18 RX ADMIN — PIPERACILLIN SODIUM AND TAZOBACTAM SODIUM 2.25 G: 2; .25 INJECTION, SOLUTION INTRAVENOUS at 18:21

## 2025-04-18 RX ADMIN — DIPHENHYDRAMINE HYDROCHLORIDE 25 MG: 50 INJECTION, SOLUTION INTRAMUSCULAR; INTRAVENOUS at 18:21

## 2025-04-18 RX ADMIN — HYDROMORPHONE HYDROCHLORIDE 0.6 MG: 1 INJECTION, SOLUTION INTRAMUSCULAR; INTRAVENOUS; SUBCUTANEOUS at 21:30

## 2025-04-18 RX ADMIN — DIPHENHYDRAMINE HYDROCHLORIDE 25 MG: 50 INJECTION, SOLUTION INTRAMUSCULAR; INTRAVENOUS at 08:04

## 2025-04-18 RX ADMIN — METOPROLOL TARTRATE 25 MG: 25 TABLET, FILM COATED ORAL at 21:26

## 2025-04-18 RX ADMIN — OXYCODONE HYDROCHLORIDE AND ACETAMINOPHEN 1 TABLET: 5; 325 TABLET ORAL at 01:58

## 2025-04-18 RX ADMIN — LEVETIRACETAM 750 MG: 500 TABLET, FILM COATED ORAL at 21:25

## 2025-04-18 RX ADMIN — CLONIDINE HYDROCHLORIDE 0.1 MG: 0.2 TABLET ORAL at 05:09

## 2025-04-18 RX ADMIN — HEPARIN SODIUM 1.2 UNITS: 1000 INJECTION, SOLUTION INTRAVENOUS; SUBCUTANEOUS at 01:54

## 2025-04-18 RX ADMIN — MIRTAZAPINE 7.5 MG: 7.5 TABLET, FILM COATED ORAL at 21:25

## 2025-04-18 RX ADMIN — PROMETHAZINE HYDROCHLORIDE 25 MG: 25 TABLET ORAL at 05:14

## 2025-04-18 RX ADMIN — ASPIRIN 81 MG: 81 TABLET, COATED ORAL at 09:41

## 2025-04-18 RX ADMIN — HYDROMORPHONE HYDROCHLORIDE 0.2 MG: 0.2 INJECTION, SOLUTION INTRAMUSCULAR; INTRAVENOUS; SUBCUTANEOUS at 13:00

## 2025-04-18 RX ADMIN — CLONIDINE HYDROCHLORIDE 0.1 MG: 0.2 TABLET ORAL at 21:25

## 2025-04-18 RX ADMIN — EPOETIN ALFA-EPBX 6440 UNITS: 20000 INJECTION, SOLUTION INTRAVENOUS; SUBCUTANEOUS at 18:50

## 2025-04-18 RX ADMIN — VANCOMYCIN 1750 MG: 1.25 INJECTION, SOLUTION INTRAVENOUS at 00:49

## 2025-04-18 RX ADMIN — HYDROMORPHONE HYDROCHLORIDE 0.2 MG: 0.2 INJECTION, SOLUTION INTRAMUSCULAR; INTRAVENOUS; SUBCUTANEOUS at 09:48

## 2025-04-18 RX ADMIN — HYDROMORPHONE HYDROCHLORIDE 0.6 MG: 1 INJECTION, SOLUTION INTRAMUSCULAR; INTRAVENOUS; SUBCUTANEOUS at 18:21

## 2025-04-18 RX ADMIN — DIPHENHYDRAMINE HYDROCHLORIDE 25 MG: 50 INJECTION, SOLUTION INTRAMUSCULAR; INTRAVENOUS at 21:30

## 2025-04-18 RX ADMIN — PREGABALIN 50 MG: 50 CAPSULE ORAL at 09:41

## 2025-04-18 RX ADMIN — DIPHENHYDRAMINE HYDROCHLORIDE 25 MG: 50 INJECTION, SOLUTION INTRAMUSCULAR; INTRAVENOUS at 14:33

## 2025-04-18 RX ADMIN — CLONIDINE HYDROCHLORIDE 0.1 MG: 0.2 TABLET ORAL at 14:34

## 2025-04-18 RX ADMIN — PIPERACILLIN SODIUM AND TAZOBACTAM SODIUM 2.25 G: 2; .25 INJECTION, SOLUTION INTRAVENOUS at 09:43

## 2025-04-18 RX ADMIN — PIPERACILLIN SODIUM AND TAZOBACTAM SODIUM 2.25 G: 2; .25 INJECTION, SOLUTION INTRAVENOUS at 01:50

## 2025-04-18 RX ADMIN — METOPROLOL TARTRATE 25 MG: 25 TABLET, FILM COATED ORAL at 09:41

## 2025-04-18 RX ADMIN — HYDROMORPHONE HYDROCHLORIDE 0.2 MG: 0.2 INJECTION, SOLUTION INTRAMUSCULAR; INTRAVENOUS; SUBCUTANEOUS at 02:40

## 2025-04-18 RX ADMIN — ATORVASTATIN CALCIUM 40 MG: 40 TABLET, FILM COATED ORAL at 09:41

## 2025-04-18 RX ADMIN — DIPHENHYDRAMINE HYDROCHLORIDE 25 MG: 50 INJECTION, SOLUTION INTRAMUSCULAR; INTRAVENOUS at 01:58

## 2025-04-18 RX ADMIN — OXYCODONE AND ACETAMINOPHEN 1 TABLET: 10; 325 TABLET ORAL at 06:32

## 2025-04-18 ASSESSMENT — COGNITIVE AND FUNCTIONAL STATUS - GENERAL
DAILY ACTIVITIY SCORE: 24
MOBILITY SCORE: 24
MOBILITY SCORE: 24
DAILY ACTIVITIY SCORE: 24

## 2025-04-18 ASSESSMENT — PAIN - FUNCTIONAL ASSESSMENT

## 2025-04-18 ASSESSMENT — PAIN SCALES - GENERAL
PAINLEVEL_OUTOF10: 6
PAINLEVEL_OUTOF10: 5 - MODERATE PAIN
PAINLEVEL_OUTOF10: 10 - WORST POSSIBLE PAIN
PAINLEVEL_OUTOF10: 2
PAINLEVEL_OUTOF10: 0 - NO PAIN
PAINLEVEL_OUTOF10: 3
PAINLEVEL_OUTOF10: 5 - MODERATE PAIN
PAINLEVEL_OUTOF10: 10 - WORST POSSIBLE PAIN
PAINLEVEL_OUTOF10: 0 - NO PAIN
PAINLEVEL_OUTOF10: 10 - WORST POSSIBLE PAIN
PAINLEVEL_OUTOF10: 0 - NO PAIN
PAINLEVEL_OUTOF10: 10 - WORST POSSIBLE PAIN

## 2025-04-18 ASSESSMENT — PAIN DESCRIPTION - DESCRIPTORS
DESCRIPTORS: ACHING
DESCRIPTORS: ACHING;DISCOMFORT
DESCRIPTORS: ACHING;DISCOMFORT;SHARP
DESCRIPTORS: DISCOMFORT
DESCRIPTORS: ACHING;DISCOMFORT
DESCRIPTORS: DISCOMFORT;THROBBING
DESCRIPTORS: ACHING;DISCOMFORT
DESCRIPTORS: THROBBING
DESCRIPTORS: ACHING;DISCOMFORT

## 2025-04-18 ASSESSMENT — ACTIVITIES OF DAILY LIVING (ADL)
EFFECT OF PAIN ON DAILY ACTIVITIES: UNABLE TO GET COMFORTABLE
EFFECT OF PAIN ON DAILY ACTIVITIES: UNABLE TO FALL ASLEEP
EFFECT OF PAIN ON DAILY ACTIVITIES: UNABLE TO GET COMFORTABLE

## 2025-04-18 ASSESSMENT — PAIN DESCRIPTION - LOCATION
LOCATION: GENERALIZED
LOCATION: GROIN

## 2025-04-18 ASSESSMENT — PAIN DESCRIPTION - ORIENTATION: ORIENTATION: RIGHT;LEFT;MID

## 2025-04-18 NOTE — NURSING NOTE
Patient arrived to floor. Alert and oriented. Patient has right groin dialysis catheter site with some redness. Has left IV groin dialysis site.

## 2025-04-18 NOTE — PRE-PROCEDURE NOTE
..Report from Sending RN:    Report From: Tayla Lowe Rn  Recent Surgery of Procedure: Yes  Baseline Level of Consciousness (LOC): x4  Oxygen Use: No    Diabetic: No  Last BP Med Given Day of Dialysis: See Mar  Last Pain Med Given: See Mar  Lab Tests to be Obtained with Dialysis: No  Blood Transfusion to be Given During Dialysis: No  Available IV Access: Yes  Medications to be Administered During Dialysis: No  Continuous IV Infusion Running: No  Restraints on Currently or in the Last 24 Hours: No  Hand-Off Communication: Pt. Needs Emergency dialysis  Dialysis Catheter Dressing: Clean dry intact  Last Dressing Change: 4/17/2025

## 2025-04-18 NOTE — H&P
[facility] Critical Care Medicine H&P      Date:  4/17/2025  Patient:  Batsheva Page  YOB: 1975  MRN:  [id number]   Admit Date:  4/17/2025      Chief Complaint   Patient presents with    Vascular Access Problem       History of Present Illness:  Batsheva Page is a 49 y.o. year old female patient with past medical history of ESRD on hemodialysis, recurrent MSSA/MRSA bacteremia, endocarditis s/p aortic valve replacement and mitral valve replacement, HTN, HLD, seizure disorder, anemia, hyponatremia.  Patient presented to [facility], [place], operated by [facility] emergency department 4/17 with complaints that her dialysis catheter may be infected.  She complained of hurting at the site, redness, drainage.  She did report receiving dialysis 4/16, however stated that her catheter has not been working intermittently and her dialysis sessions have been being interrupted.  She denies any significant fevers.  Complains of fatigue.  Denies any chest pain or shortness of breath.  Denies any significant nausea or vomiting.    ED Course:  On arrival to ED patient was afebrile T36.5.  Normotensive.  Normal sinus rhythm.  Oxygen saturation 100% on room air.  CBC unremarkable, no significant leukocytosis.  Chemistry showed elevated creatinine near baseline at 6.95, BUN 41.  Significant hyperkalemia with potassium 8.2.  Lactate normal.  Hyperkalemia was treated with insulin, dextrose, calcium and improved to 7.6.  Patient with right femoral tunneled dialysis catheter, did have redness and drainage at the site concerning for site infection therefore no urgent dialysis was initiated through this catheter.  She was given vancomycin and Zosyn due to concern for infection.  Patient was admitted to the ICU for new temporary dialysis catheter placement and emergent dialysis.    Interval ICU Events:  4/17: Admitted to the ICU with needs for emergent dialysis.  Right femoral tunneled dialysis catheter in place, but reportedly not working  "adequately.  Left femoral temporary dialysis catheter placed by ICU attending.  Patient afebrile, hemodynamically stable.  Possible cellulitis at the right femoral catheter site, but no signs of systemic infection.  Will get emergent dialysis tonight.  Can continue vancomycin and Zosyn for now, follow blood cultures and if negative at 48 hours can likely de-escalate or discontinue antibiotics.  Consult placed to IR for tunneled dialysis catheter exchange.      Medical History:  Medical History[1]  Surgical History[2]  Prescriptions Prior to Admission[3]  Kayexalate, Metoclopramide hcl, Prochlorperazine, Zofran [ondansetron hcl], and Ondansetron  Social History[4]  Family History[5]    Hospital Medications:    Continuous Medications[6]    Current Medications[7]    Review of Systems:  14 point review of systems was completed and negative except for those specially mention in my HPI    Physical Exam:    Heart Rate:  []   Temp:  [36.2 °C (97.2 °F)-36.5 °C (97.7 °F)]   Resp:  [12-18]   BP: (120-182)/(69-99)   Height:  [165.1 cm (5' 5\")-172.7 cm (5' 8\")]   Weight:  [67 kg (147 lb 11.3 oz)-70 kg (154 lb 5.2 oz)]   SpO2:  [99 %-100 %]     Physical Exam  Constitutional:       Appearance: She is ill-appearing.   HENT:      Head: Normocephalic and atraumatic.      Mouth/Throat:      Mouth: Mucous membranes are moist.      Pharynx: Oropharynx is clear. No oropharyngeal exudate.   Eyes:      Extraocular Movements: Extraocular movements intact.      Pupils: Pupils are equal, round, and reactive to light.   Cardiovascular:      Rate and Rhythm: Normal rate and regular rhythm.      Pulses: Normal pulses.      Heart sounds: Normal heart sounds. No murmur heard.     No friction rub.   Pulmonary:      Effort: Pulmonary effort is normal. No respiratory distress.      Breath sounds: Normal breath sounds. No wheezing or rales.   Abdominal:      General: There is no distension.      Palpations: Abdomen is soft.   Skin:     General: " Skin is warm and dry.      Capillary Refill: Capillary refill takes less than 2 seconds.   Neurological:      General: No focal deficit present.      Mental Status: She is alert. Mental status is at baseline.         Objective:    I have reviewed all medications, laboratory results, and imaging pertinent for today's encounter.           Intake/Output Summary (Last 24 hours) at 4/17/2025 2147  Last data filed at 4/17/2025 2110  Gross per 24 hour   Intake 987.5 ml   Output 267 ml   Net 720.5 ml         Assessment/Plan:    Batsheva Page is a 49 y.o. year old female patient with past medical history of ESRD on hemodialysis, recurrent MSSA/MRSA bacteremia, endocarditis s/p aortic valve replacement and mitral valve replacement, HTN, HLD, seizure disorder, anemia, hyponatremia who was admitted to [facility] ICU 4/17 due to malfunctioning right femoral tunneled dialysis catheter.  Left femoral temporary dialysis catheter placed and she underwent emergent dialysis.    Neuro/Psych/Pain Ctrl/Sedation:  #History of seizure disorder  #History of anxiety  -- Neurochecks, CAM assessment, delirium precaution  -- Tylenol, Percocet, Dilaudid for pain control  -- Receives Benadryl prior to HD sessions    Respiratory/ENT:  No acute respiratory concerns, on room air    Cardiovascular:  #History of HTN, HLD  -- Continue home aspirin and statin  -- Continue home metoprolol and clonidine as BP allows    GI:  No acute GI issues  -- Regular diet  -- Bowel regimen as needed    Renal/Volume Status (Intra & Extravascular):  #ESRD on IHD  #Malfunctioning right femoral tunneled dialysis catheter  -- Underwent temporary left femoral HD catheter placement 4/17  -- Stat HD 4/17 for hyperkalemia and then can resume normal schedule  -- Continue home Retacrit, Calcitrol  -- IR consulted for exchange of tunneled dialysis catheter  -- Nephrology following    Endocrine  No history of diabetes or thyroid dysfunction.  Maintain  euglycemia    Infectious Disease:  #Concern for dialysis catheter site infection  -- Right femoral dialysis catheter with redness, pain, drainage.  However afebrile, no leukocytosis, less concern for systemic infection  -- Blood cultures 4/16: NGTD  -- Blood cultures 4/17: Pending  -- Currently on vancomycin and Zosyn, follow cultures, if negative at 48 hours can likely de-escalate or discontinue antibiotics    Heme/Onc:  #Anemia of chronic renal disease  -- Continue home Retacrit  -- Daily CBC, transfuse for Hb >7    ORTHO/MSK:  -- Independent, no PT/OT needs    Ethics/Code Status:  FULL CODE    :  DVT Prophylaxis: Refusing chemical prophylaxis  GI Prophylaxis: No indication  Bowel Regimen: As needed  Diet: Renal  CVC: Right fem tunneled HD catheter, left femoral temp HD catheter  Kathe: no  Mckinney: no  Restraints: no  Dispo: ICU    Critical Care Time:  45 minutes spent in preparing to see patient (I.e. review of medical records), evaluation of diagnostics (I.e. labs, imaging, etc.), documentation, discussing plan of care with patient/ family/ caregiver, and/ or coordination of care with multidisciplinary team. Time does not include completion of procedure time.     Ubaldo Erazo, APRN-CNP  Pulmonary & Critical Care Medicine  [facility]         [1]   Past Medical History:  Diagnosis Date    Aortic valve replaced 2022    CHF (congestive heart failure)     Chronic pain     Coronary artery disease     Disease of thyroid gland     ESRD (end stage renal disease) (Multi)     H/O mitral valve replacement 2013    and 2022    Heart disease     History of transfusion     Hypertension     Mitral valve regurgitation     Seizures (Multi)     Stroke (Multi)    [2]   Past Surgical History:  Procedure Laterality Date    AORTIC VALVE REPLACEMENT  09/26/2022    bioprosthetic    CORONARY ARTERY BYPASS GRAFT      IR CVC  01/12/2024    exchange    IR CVC  05/07/2024    exchange    IR CVC  08/20/2024     removal    IR CVC TUNNELED  09/09/2022    IR CVC TUNNELED 9/9/2022 [facility] INPATIENT LEGAC[facility]    IR CVC TUNNELED  12/28/2022    IR CVC TUNNELED 12/28/2022 [facility] INPATIENT LEGACY    MITRAL VALVE REPAIR  2013    MITRAL VALVE REPLACEMENT  09/26/2022    bioprosthetic    PARATHYROIDECTOMY      TRICUSPID VALVULOPLASTY  2013    US GUIDED PERCUTANEOUS PLACEMENT  07/14/2022    US GUIDED PERCUTANEOUS PLACEMENT [facility]   [3]   Medications Prior to Admission   Medication Sig Dispense Refill Last Dose/Taking    acetaminophen (Tylenol) 325 mg tablet Take 2 tablets (650 mg) by mouth every 6 hours as needed for mild pain (1 - 3). 30 tablet 0 4/17/2025    aspirin 81 mg EC tablet Take 1 tablet (81 mg) by mouth once daily. 30 tablet 2 4/17/2025    atorvastatin (Lipitor) 40 mg tablet Take 1 tablet (40 mg) by mouth once daily.   4/17/2025    calcitriol (Rocaltrol) 0.25 mcg capsule Take 2 capsules (0.5 mcg) by mouth once daily.   4/17/2025    cloNIDine (Catapres) 0.1 mg tablet Take 1 tablet (0.1 mg) by mouth every 8 hours. 90 tablet 3 4/17/2025    epoetin brandy-epbx (RETACRIT) 20,000 unit/mL solution Inject 6,440 Units under the skin 3 times a week. Do not start before January 17, 2024. 30 mL 2 4/17/2025    ergocalciferol (Vitamin D-2) 1.25 MG (88845 UT) capsule Take 1 capsule (1.25 mg) by mouth 1 (one) time per week.   4/17/2025    levETIRAcetam (Keppra) 500 mg tablet Take 1.5 tablets (750 mg) by mouth once daily at bedtime.   4/17/2025    metoprolol tartrate (Lopressor) 25 mg tablet Take 1 tablet (25 mg) by mouth 2 times a day. 60 tablet 0 4/17/2025    oxyCODONE-acetaminophen (Percocet) 5-325 mg tablet Take 1 tablet by mouth every 6 hours as needed for moderate pain (4 - 6). 5 tablet 0 4/17/2025    promethazine (Phenergan) 25 mg tablet Take 1 tablet (25 mg) by mouth once daily as needed.   4/17/2025    hydrOXYzine HCL (Atarax) 25 mg tablet Take 1 tablet (25 mg) by mouth every 6 hours if needed for itching (1st line) for up to 7  days. 28 tablet 0     methocarbamol (Robaxin) 750 mg tablet Take 1 tablet (750 mg) by mouth every 6 hours if needed for muscle spasms for up to 7 days. 28 tablet 0     pregabalin (Lyrica) 25 mg capsule Take 2 capsules (50 mg) by mouth 2 times a day. 120 capsule 0    [4]   Social History  Tobacco Use    Smoking status: Never    Smokeless tobacco: Never   Vaping Use    Vaping status: Never Used   Substance Use Topics    Alcohol use: Never    Drug use: Never   [5]   Family History  Problem Relation Name Age of Onset    No Known Problems Mother      No Known Problems Father     [6]    [7]   Current Facility-Administered Medications:     acetaminophen (Tylenol) tablet 650 mg, 650 mg, oral, q6h PRN, Dean Maurer MD    [START ON 4/18/2025] aspirin EC tablet 81 mg, 81 mg, oral, Daily, Dean Maurer MD    [START ON 4/18/2025] atorvastatin (Lipitor) tablet 40 mg, 40 mg, oral, Daily, Dean Maurer MD    [START ON 4/18/2025] calcitriol (Rocaltrol) capsule 0.5 mcg, 0.5 mcg, oral, Daily, Dean Maurer MD    [Held by provider] cloNIDine (Catapres) tablet 0.1 mg, 0.1 mg, oral, q8h KIMBERLY, Dean Maurer MD    diphenhydrAMINE (BENADryl) injection 50 mg, 50 mg, intravenous, Once, Ubaldo Erazo, APRN-CNP    [START ON 4/18/2025] epoetin brandy-epbx (Retacrit) injection 6,440 Units, 100 Units/kg, subcutaneous, Once per day on Monday Wednesday Friday, Dean Maurer MD    [Held by provider] ergocalciferol (Vitamin D-2) capsule 1.25 mg, 1.25 mg, oral, Every Sunday, Dean Maurer MD    heparin 1,000 unit/mL injection 1,000 Units, 1,000 Units, intra-catheter, After Dialysis, Dean Maurer MD    HYDROmorphone PF (Dilaudid) injection 0.2 mg, 0.2 mg, intravenous, q4h PRN, Lianne Ibarra MD, 0.2 mg at 04/17/25 1742    hydrOXYzine HCL (Atarax) tablet 25 mg, 25 mg, oral, q6h PRN, Dean Maurer MD    [START ON 4/18/2025] levETIRAcetam (Keppra) tablet 750 mg, 750 mg, oral, Nightly, Ubaldo Erazo, APRN-CNP    [Held by  provider] methocarbamol (Robaxin) tablet 750 mg, 750 mg, oral, q6h PRN, Dean Maurer MD    [Held by provider] metoprolol tartrate (Lopressor) tablet 25 mg, 25 mg, oral, BID, Dean Maurer MD    oxyCODONE-acetaminophen (Percocet) 5-325 mg per tablet 1 tablet, 1 tablet, oral, q6h PRN, Dean Maurer MD    piperacillin-tazobactam (Zosyn) 3.375 g in dextrose (iso) IV 50 mL, 3.375 g, intravenous, q8h, ANGELA Padilla    pregabalin (Lyrica) capsule 50 mg, 50 mg, oral, BID, Dean Maurer MD    promethazine (Phenergan) tablet 25 mg, 25 mg, oral, Daily PRN, Dean Maurer MD    sodium zirconium cyclosilicate (Lokelma) packet 10 g, 10 g, oral, q8h, Dean Maurer MD    vancomycin (Vancocin) pharmacy to dose - pharmacy monitoring, , miscellaneous, Daily PRN, ANGELA Padilla    vancomycin (Xellia) 1,750 mg in diluent combination  mL, 1,750 mg, intravenous, Once, ANGELA Padilla

## 2025-04-18 NOTE — CARE PLAN
The patient's goals for the shift include  rest    The clinical goals for the shift include manage pain, remove tunneled HD cath

## 2025-04-18 NOTE — CARE PLAN
Problem: Pain - Adult  Goal: Verbalizes/displays adequate comfort level or baseline comfort level  Outcome: Progressing     Problem: Safety - Adult  Goal: Free from fall injury  Outcome: Progressing     Problem: Discharge Planning  Goal: Discharge to home or other facility with appropriate resources  Outcome: Progressing     Problem: Chronic Conditions and Co-morbidities  Goal: Patient's chronic conditions and co-morbidity symptoms are monitored and maintained or improved  Outcome: Progressing     Problem: Nutrition  Goal: Nutrient intake appropriate for maintaining nutritional needs  Outcome: Progressing     Problem: Pain  Goal: Takes deep breaths with improved pain control throughout the shift  Outcome: Progressing  Goal: Turns in bed with improved pain control throughout the shift  Outcome: Progressing  Goal: Walks with improved pain control throughout the shift  Outcome: Progressing  Goal: Performs ADL's with improved pain control throughout shift  Outcome: Progressing  Goal: Participates in PT with improved pain control throughout the shift  Outcome: Progressing  Goal: Free from opioid side effects throughout the shift  Outcome: Progressing  Goal: Free from acute confusion related to pain meds throughout the shift  Outcome: Progressing   The patient's goals for the shift include  rest, sleep, have less pain    The clinical goals for the shift include Patient will have adequate pain control and no arrythmias, tolerate HD treatment    Over the shift, the patient did make progress toward the following goals.

## 2025-04-18 NOTE — PROGRESS NOTES
Patient has no case management needs at this time. Patient does dialysis at [facility] in [place] on MWF. Patient will return home with no skilled services. If services are needed place consult.       Katharine Gonzalez RN

## 2025-04-18 NOTE — NURSING NOTE
Upon rounding right femoral permacath with current dressing dry and intact. Left femoral dialysis catheter also with current dressing dry and intact, pigtail with IVF infusing.

## 2025-04-18 NOTE — CONSULTS
Vancomycin Dosing by Pharmacy- INITIAL (HEMODIALYSIS)    Batsheva Page is a 49 y.o. year old female who Pharmacy has been consulted for vancomycin dosing for cellulitis/skin and soft tissue infection. Based on the patient's indication and renal status this patient will be dosed based on a Pre-HD level of 20-25 mcg/mL.     Patient currently appears to be on hemodialysis Tues/Thurs/Sat     Visit Vitals  BP (!) 143/95   Pulse 78   Temp 36.2 °C (97.2 °F) (Temporal)   Resp 17           Lab Results   Component Value Date    CREATININE 6.95 (H) 04/17/2025    CREATININE 6.94 (H) 04/17/2025    CREATININE 4.22 (H) 12/16/2024    CREATININE 7.50 (H) 12/15/2024       I/O last 3 completed shifts:  In: 800 (11.9 mL/kg) [I.V.:400 (6 mL/kg); Other:200; IV Piggyback:200]  Out: 267 (4 mL/kg) [Other:267]  Weight: 67 kg     Assessment/Plan     Initial Loading Dosing:will be given a loading dose of 1750 mg   Vancomycin maintenance dose: 750 mg after each dialysis session Tues/Thurs/Sat  Pre-HD level will be obtained on 4/22 at 0500. May be obtained sooner if clinically indicated.   Will continue to monitor renal function daily while on vancomycin and order serum creatinine at least every 48 hours if not already ordered.  Follow for continued vancomycin needs, clinical response, and signs/symptoms of toxicity.     Sushil Pressley, PharmD

## 2025-04-18 NOTE — PROCEDURES
TEMPORARY HEMODIALYSIS CATHETER PLACEMENT     Indication: Acute renal failure with associated or anticipated acid-base and/or electrolyte abnormality.     Site:  - Left femoral vein     Catheter:  -13F, 3 lumen, 15cm Bard power trialysis short-term, curved extension dialysis catheter   Sedation: Total of 5 of versed and 50 of fentanyl. Patient was awake and on an end tidal monitor and pulse ox.  Patient positioned supine   Sterility: Chlorhexidine skin prep. Hat and mask on myself and assistant(s). Antiseptic hand foam. Sterile gown, gloves and drape.  Local anesthesia: 5 mL of 1% lidocaine subcutaneously.  Ultrasound-guided insertion:  -YES (with sterile ultrasound probe cover)     The vessel was accessed on the first attempt however the wire would not thread.  I held pressure in the area.  There was good hemostasis.  I moved about 1-1/2 cm proximal.    -Seldinger technique with 18 Ga x 2.5 in introducer needle. Guidewire thread easily through introducer needle.  -Small skin incision adjacent to guidewire with #11 scalpel.  -Serial tissue dilation with 11 Fr and 13 Fr tissue dilators over guidewire.  Catheter thread easily over guidewire and guidewire removed. All ports aspirated for complete removal of air, then flushed with sterile NS. Catheter secured with sutures.  Transparent film dressing with CHG.  Sterility maintained throughout procedure. No complications. Patient is very anxious and was reassured throughout the procedure.     Other details: None.     Lianne Ibarra MD  4/17/2025  9:00 PM

## 2025-04-18 NOTE — PROGRESS NOTES
"Batsheva Page is a 49 y.o. female on day 1 of admission presenting with Right foot infection.      Subjective   Patient underwent urgent dialysis last night after a temporary dialysis catheter was placed and her potassium normalized.  Today she reports pain at dialysis catheter site, diffuse pain everywhere and anxiety.         Objective          Vitals 24HR  Heart Rate:  []   Temp:  [36.2 °C (97.1 °F)-37.1 °C (98.8 °F)]   Resp:  [11-33]   BP: (122-178)/()   Height:  [172.7 cm (5' 8\")]   Weight:  [70 kg (154 lb 5.2 oz)-70.7 kg (155 lb 13.8 oz)]   SpO2:  [95 %-100 %]     Intake/Output last 3 Shifts:    Intake/Output Summary (Last 24 hours) at 4/18/2025 1654  Last data filed at 4/18/2025 1015  Gross per 24 hour   Intake 1887.5 ml   Output 1900 ml   Net -12.5 ml       Physical Exam  Constitutional:       General: She is awake. She is not in acute distress.  Cardiovascular:      Rate and Rhythm: Regular rhythm.      Heart sounds:      No friction rub.   Pulmonary:      Effort: Pulmonary effort is normal.      Breath sounds: Normal breath sounds.   Abdominal:      General: Bowel sounds are normal.      Palpations: Abdomen is soft.      Tenderness: There is no guarding or rebound.   Musculoskeletal:      Comments: Trace edema   Neurological:      Mental Status: She is alert.         Relevant Results  Results for orders placed or performed during the hospital encounter of 04/17/25 (from the past 24 hours)   CBC   Result Value Ref Range    WBC 5.4 4.4 - 11.3 x10*3/uL    nRBC 0.0 0.0 - 0.0 /100 WBCs    RBC 3.25 (L) 4.00 - 5.20 x10*6/uL    Hemoglobin 10.0 (L) 12.0 - 16.0 g/dL    Hematocrit 31.8 (L) 36.0 - 46.0 %    MCV 98 80 - 100 fL    MCH 30.8 26.0 - 34.0 pg    MCHC 31.4 (L) 32.0 - 36.0 g/dL    RDW 16.3 (H) 11.5 - 14.5 %    Platelets 129 (L) 150 - 450 x10*3/uL   Magnesium   Result Value Ref Range    Magnesium 1.62 1.60 - 2.40 mg/dL   Renal Function Panel   Result Value Ref Range    Glucose 117 (H) 74 - 99 " mg/dL    Sodium 133 (L) 136 - 145 mmol/L    Potassium 4.2 3.5 - 5.3 mmol/L    Chloride 94 (L) 98 - 107 mmol/L    Bicarbonate 29 21 - 32 mmol/L    Anion Gap 14 10 - 20 mmol/L    Urea Nitrogen 9 6 - 23 mg/dL    Creatinine 2.18 (H) 0.50 - 1.05 mg/dL    eGFR 27 (L) >60 mL/min/1.73m*2    Calcium 7.8 (L) 8.6 - 10.3 mg/dL    Phosphorus 2.5 2.5 - 4.9 mg/dL    Albumin 3.3 (L) 3.4 - 5.0 g/dL            Assessment & Plan  Right foot infection    Hyperkalemia    End-stage renal disease on hemodialysis  Hyperkalemia, resolved  Infection of dialysis catheter site and malfunctioning dialysis catheter     Plan: Underwent urgent dialysis overnight in the ICU after a temporary dialysis catheter was placed by the ICU team.  She has a nonfunctional tunneled femoral dialysis catheter which will need to be exchanged by interventional radiology as soon as able.  Infectious disease on consult.  Monitor culture results.  Renal diet.     Vu Pedersen MD

## 2025-04-18 NOTE — CONSULTS
"Inpatient consult to Psychiatry  Consult performed by: ORI Michael-CNP  Consult ordered by: Lianne Ibarra MD  Reason for consult: \"severe anxiety, inhibiting care\"      PSYCHIATRY CONSULT NOTE      Visit type: Face to face evaluation       HISTORY OF PRESENT ILLNESS:     Batsheva Page is a 49 y.o. female with a past psychiatric history of depression and anxiety and a past medical history of ESRD on hemodialysis, recurrent MSSA/MRSA bacteremia, endocarditis s/p aortic valve replacement and mitral valve replacement, HTN, HLD, seizure disorder, anemia, hyponatremia presented to [facility] emergency department 4/17 with complaints that her dialysis catheter may be infected. She complained of hurting at the site, redness, drainage.     Patient's case was discussed with the nursing staff, who noted that patient is currently taking multiple pain medications for her discomfort and has also been experiencing nausea. She is not agitated or combative and remains cooperative with care as well as compliant with her medications.    Patient was evaluated in her assigned room, she was awake, lying in bed. When asked what brought her to the hospital, she stated that she was concerned her dialysis catheter was infected. Upon further questioning regarding her mental health, patient disclosed that she has experienced depression and anxiety for the past few months. She described having minimal sleep--reporting that after dialysis she is able to sleep briefly, but otherwise she struggles with poor sleep. She also noted a poor appetite, low energy, irritability, and a decreased interest in activities she once enjoyed. Patient recognized that her numerous medical issues have contributed to both her physical pain and anxiety. She denied panic attacks, current suicidal or homicidal ideation, auditory or visual hallucinations, and reported no past suicide attempts. She stated that she lives with a friend and feels safe at " "home. Patient expressed willingness to try a medication that may help with her depression while also addressing issues with sleep, appetite, and anxiety. Remeron was discussed as an option, and patient agreed to initiate treatment.    Past Psychiatric History  Current/Previous Diagnoses: Depression, anxiety  Current Psychiatrist/Provider: Denies  Current Therapist: Denies  Other Providers / Agencies: Denies  Outpatient Treatment History: Denies  Past Medication Trials:  Per chart review, Lexapro, Venlafaxine, Remeron, Wellbutrin, atarax  Inpatient Hospitalizations: Denies  Suicide Attempts: Denies  Homicide attempts/Violence: Denies  Self Harm/Self Injurious: Denies    Substance Abuse History  Tobacco use history: Denies  Alcohol use history: Denies  Cannabis use history: Denies  Illicit Drug Use History: Denies    Social History  She reports that she has never smoked. She has never used smokeless tobacco. She reports that she does not drink alcohol and does not use drugs.  Household: lives \"with a friend\"  Legal hx: Denies  History of trauma/abuse: Denies  Weapons at home and access to lethal means: Denies    OARRS REVIEW  OARRS checked: yes   OARRS comments: score 330, Oxycodone-Acetaminophen 5-325 (04/15/2025), Pregabalin 25 Mg Capsule (04/04/2025).    Past Medical History  She has a past medical history of Aortic valve replaced (2022), CHF (congestive heart failure), Chronic pain, Coronary artery disease, Disease of thyroid gland, ESRD (end stage renal disease) (Multi), H/O mitral valve replacement (2013), Heart disease, History of transfusion, Hypertension, Mitral valve regurgitation, Seizures (Multi), and Stroke (Multi).      ALLERGIES  Kayexalate, Metoclopramide hcl, Prochlorperazine, Zofran [ondansetron hcl], and Ondansetron    Surgical History  She has a past surgical history that includes IR CVC tunneled (09/09/2022); IR CVC tunneled (12/28/2022); US guided percutaneous placement (07/14/2022); " "Parathyroidectomy; Coronary artery bypass graft; Aortic valve replacement (09/26/2022); Mitral valve replacement (09/26/2022); Mitral valve repair (2013); Tricuspid valvuloplasty (2013); IR CVC (01/12/2024); IR CVC (05/07/2024); and IR CVC (08/20/2024).    FAMILY HISTORY  Family History[1]   Family Psychiatric History: patient denies    PSYCHIATRIC REVIEW OF SYSTEMS  Depression: fatigue or loss of energy, markedly diminished interest or pleasure in all or most activities, and changes in appetite or weight leading to significant weight loss or gain unintentionally   Anxiety: restlessness or feeling keyed up or on edge and irritability  Suzanne: negative  Psychosis: negative  Delirium: negative   Trauma: negative    OBJECTIVE:     VITALS      4/18/2025     6:00 AM 4/18/2025     6:32 AM 4/18/2025     6:53 AM 4/18/2025     7:00 AM 4/18/2025     8:00 AM 4/18/2025     9:25 AM 4/18/2025    11:35 AM   Vitals   Systolic 164   153 178 158 166   Diastolic 98   87 97 92 96   Heart Rate 85 93 92 90 94 91 83   Temp     36.8 °C (98.3 °F)  36.9 °C (98.4 °F)   Resp 18 15 23 11 33 20 18      Body mass index is 23.7 kg/m².  Facility age limit for growth %marco is 20 years.  Wt Readings from Last 4 Encounters:   04/18/25 70.7 kg (155 lb 13.8 oz)   12/16/24 67.6 kg (149 lb 0.5 oz)   12/06/24 71.9 kg (158 lb 9.6 oz)   12/02/24 68 kg (149 lb 14.6 oz)       Mental Status Exam  General: 49 years old female, lying in bed comfortably during interview.  Appearance: Appeared as age stated; appropriately dressed/groomed.  Attitude:  calm, cooperative  Behavior: Fair EC; overall responding appropriately  Motor Activity: No notable boby PMAR  Speech: Clear, with fair phonation, and no lisp nor dysarthria.   Mood: \"ok\"  Affect: Dysthymic; constricted range/intensity; appropriate and congruent  Thought Process: Linear and logical; not perseverating   Thought Content: Denied SI/HI. Not voicing/endorsing delusions.  Thought Perception: Did not appear to " be responding to internal stimuli. Not endorsing AVH  Cognition: Grossly intact; A&O x4/4 to self, place, date, and context.  Insight: Good as evidenced by patient's awareness and understanding of symptoms and benefit of treatment  Judgement: good, compliant with her scheduled medications    HOME MEDICATIONS  Medication Documentation Review Audit       Reviewed by Dean Maurer MD (Physician) on 25 at 1657      Medication Order Taking? Sig Documenting Provider Last Dose Status   acetaminophen (Tylenol) 325 mg tablet [id number] Yes Take 2 tablets (650 mg) by mouth every 6 hours as needed for mild pain (1 - 3). Washington Perdue DO 2025 Active   aspirin 81 mg EC tablet [id number] Yes Take 1 tablet (81 mg) by mouth once daily. Washington Perdue DO 2025 Active   atorvastatin (Lipitor) 40 mg tablet [id number] Yes Take 1 tablet (40 mg) by mouth once daily. Historical Provider, MD 2025 Active   calcitriol (Rocaltrol) 0.25 mcg capsule [id number] Yes Take 2 capsules (0.5 mcg) by mouth once daily. Historical Provider, MD 2025 Active   cloNIDine (Catapres) 0.1 mg tablet [id number] Yes Take 1 tablet (0.1 mg) by mouth every 8 hours. Washington Perdue DO 2025 Active   epoetin brandy-epbx (RETACRIT) 20,000 unit/mL solution [id number] Yes Inject 6,440 Units under the skin 3 times a week. Do not start before 2024. Washington Perdue DO 2025 Active   ergocalciferol (Vitamin D-2) 1.25 MG (94716 UT) capsule [id number] Yes Take 1 capsule (1.25 mg) by mouth 1 (one) time per week. Historical Provider, MD 2025 Active   hydrOXYzine HCL (Atarax) 25 mg tablet [id number]  Take 1 tablet (25 mg) by mouth every 6 hours if needed for itching (1st line) for up to 7 days. Keeley Murdock MD   24 [id number]   levETIRAcetam (Keppra) 500 mg tablet [id number] Yes Take 1.5 tablets (750 mg) by mouth once daily at bedtime. Historical Provider, MD 2025 Active   methocarbamol (Robaxin)  750 mg tablet [id number]  Take 1 tablet (750 mg) by mouth every 6 hours if needed for muscle spasms for up to 7 days. Keeley Murdock MD   24   metoprolol tartrate (Lopressor) 25 mg tablet [id number] Yes Take 1 tablet (25 mg) by mouth 2 times a day. Keeley Murdock MD 2025 Active   oxyCODONE-acetaminophen (Percocet) 5-325 mg tablet [id number] Yes Take 1 tablet by mouth every 6 hours as needed for moderate pain (4 - 6). Washington Perdue DO 2025 Active   pregabalin (Lyrica) 25 mg capsule [id number]  Take 2 capsules (50 mg) by mouth 2 times a day. Pamela Saldana MD   24   promethazine (Phenergan) 25 mg tablet [id number] Yes Take 1 tablet (25 mg) by mouth once daily as needed. Alfredo Meza MD 2025 Active                     CURRENT MEDICATIONS  Scheduled medications  Scheduled Medications[2]    Continuous medications  Continuous Medications[3]    PRN medications  PRN Medications[4]     LABS  Admission on 2025   Component Date Value Ref Range Status    Blood Culture 2025 Loaded on Instrument - Culture in progress   Preliminary    Blood Culture 2025 Loaded on Instrument - Culture in progress   Preliminary    WBC 2025 5.8  4.4 - 11.3 x10*3/uL Final    nRBC 2025 0.0  0.0 - 0.0 /100 WBCs Final    RBC 2025 3.60 (L)  4.00 - 5.20 x10*6/uL Final    Hemoglobin 2025 11.1 (L)  12.0 - 16.0 g/dL Final    Hematocrit 2025 35.4 (L)  36.0 - 46.0 % Final    MCV 2025 98  80 - 100 fL Final    MCH 2025 30.8  26.0 - 34.0 pg Final    MCHC 2025 31.4 (L)  32.0 - 36.0 g/dL Final    RDW 2025 16.5 (H)  11.5 - 14.5 % Final    Platelets 2025 166  150 - 450 x10*3/uL Final    Neutrophils % 2025 64.6  40.0 - 80.0 % Final    Immature Granulocytes %, Automated 2025 0.2  0.0 - 0.9 % Final    Immature Granulocyte Count (IG) includes promyelocytes, myelocytes and metamyelocytes but does not include bands.  Percent differential counts (%) should be interpreted in the context of the absolute cell counts (cells/UL).    Lymphocytes % 04/17/2025 18.7  13.0 - 44.0 % Final    Monocytes % 04/17/2025 6.2  2.0 - 10.0 % Final    Eosinophils % 04/17/2025 9.6  0.0 - 6.0 % Final    Basophils % 04/17/2025 0.7  0.0 - 2.0 % Final    Neutrophils Absolute 04/17/2025 3.77  1.20 - 7.70 x10*3/uL Final    Percent differential counts (%) should be interpreted in the context of the absolute cell counts (cells/uL).    Immature Granulocytes Absolute, Au* 04/17/2025 0.01  0.00 - 0.70 x10*3/uL Final    Lymphocytes Absolute 04/17/2025 1.09 (L)  1.20 - 4.80 x10*3/uL Final    Monocytes Absolute 04/17/2025 0.36  0.10 - 1.00 x10*3/uL Final    Eosinophils Absolute 04/17/2025 0.56  0.00 - 0.70 x10*3/uL Final    Basophils Absolute 04/17/2025 0.04  0.00 - 0.10 x10*3/uL Final    Glucose 04/17/2025 82  74 - 99 mg/dL Final    Sodium 04/17/2025 131 (L)  136 - 145 mmol/L Final    Potassium 04/17/2025 8.2 (HH)  3.5 - 5.3 mmol/L Final    SPOKE TO GOPAL GUTIÉRREZ ABOUT THE K patient is on dialysis    Chloride 04/17/2025 90 (L)  98 - 107 mmol/L Final    Bicarbonate 04/17/2025 30  21 - 32 mmol/L Final    Anion Gap 04/17/2025 19  10 - 20 mmol/L Final    Urea Nitrogen 04/17/2025 41 (H)  6 - 23 mg/dL Final    Creatinine 04/17/2025 6.94 (H)  0.50 - 1.05 mg/dL Final    eGFR 04/17/2025 7 (L)  >60 mL/min/1.73m*2 Final    Calculations of estimated GFR are performed using the 2021 CKD-EPI Study Refit equation without the race variable for the IDMS-Traceable creatinine methods.  https://jasn.asnjournals.org/content/early/2021/09/22/ASN.7456719597    Calcium 04/17/2025 7.2 (L)  8.6 - 10.3 mg/dL Final    Albumin 04/17/2025 3.8  3.4 - 5.0 g/dL Final    Alkaline Phosphatase 04/17/2025 68  33 - 110 U/L Final    Total Protein 04/17/2025 8.6 (H)  6.4 - 8.2 g/dL Final    AST 04/17/2025 17  9 - 39 U/L Final    Bilirubin, Total 04/17/2025 0.5  0.0 - 1.2 mg/dL Final    ALT 04/17/2025 9   7 - 45 U/L Final    Patients treated with Sulfasalazine may generate falsely decreased results for ALT.    Lactate 04/17/2025 0.6  0.4 - 2.0 mmol/L Final    Glucose 04/17/2025 85  74 - 99 mg/dL Final    Sodium 04/17/2025 132 (L)  136 - 145 mmol/L Final    Potassium 04/17/2025 7.6 (HH)  3.5 - 5.3 mmol/L Final    Chloride 04/17/2025 92 (L)  98 - 107 mmol/L Final    Bicarbonate 04/17/2025 29  21 - 32 mmol/L Final    Anion Gap 04/17/2025 19  10 - 20 mmol/L Final    Urea Nitrogen 04/17/2025 41 (H)  6 - 23 mg/dL Final    Creatinine 04/17/2025 6.95 (H)  0.50 - 1.05 mg/dL Final    eGFR 04/17/2025 7 (L)  >60 mL/min/1.73m*2 Final    Calculations of estimated GFR are performed using the 2021 CKD-EPI Study Refit equation without the race variable for the IDMS-Traceable creatinine methods.  https://jasn.asnjournals.org/content/early/2021/09/22/ASN.8829070962    Calcium 04/17/2025 6.9 (L)  8.6 - 10.3 mg/dL Final    Ventricular Rate 04/17/2025 73  BPM Final    Atrial Rate 04/17/2025 73  BPM Final    WV Interval 04/17/2025 202  ms Final    QRS Duration 04/17/2025 74  ms Final    QT Interval 04/17/2025 392  ms Final    QTC Calculation(Bazett) 04/17/2025 431  ms Final    P Axis 04/17/2025 55  degrees Final    R Axis 04/17/2025 -12  degrees Final    T Axis 04/17/2025 55  degrees Final    QRS Count 04/17/2025 13  beats Final    Q Onset 04/17/2025 223  ms Final    P Onset 04/17/2025 122  ms Final    P Offset 04/17/2025 178  ms Final    T Offset 04/17/2025 419  ms Final    QTC Fredericia 04/17/2025 418  ms Final    POCT Glucose 04/17/2025 82  74 - 99 mg/dL Final    WBC 04/18/2025 5.4  4.4 - 11.3 x10*3/uL Final    nRBC 04/18/2025 0.0  0.0 - 0.0 /100 WBCs Final    RBC 04/18/2025 3.25 (L)  4.00 - 5.20 x10*6/uL Final    Hemoglobin 04/18/2025 10.0 (L)  12.0 - 16.0 g/dL Final    Hematocrit 04/18/2025 31.8 (L)  36.0 - 46.0 % Final    MCV 04/18/2025 98  80 - 100 fL Final    MCH 04/18/2025 30.8  26.0 - 34.0 pg Final    MCHC 04/18/2025 31.4 (L)   32.0 - 36.0 g/dL Final    RDW 04/18/2025 16.3 (H)  11.5 - 14.5 % Final    Platelets 04/18/2025 129 (L)  150 - 450 x10*3/uL Final    Platelet count verified by smear review. Sample integrity checked.    Magnesium 04/18/2025 1.62  1.60 - 2.40 mg/dL Final    Glucose 04/18/2025 117 (H)  74 - 99 mg/dL Final    Sodium 04/18/2025 133 (L)  136 - 145 mmol/L Final    Potassium 04/18/2025 4.2  3.5 - 5.3 mmol/L Final    Chloride 04/18/2025 94 (L)  98 - 107 mmol/L Final    Bicarbonate 04/18/2025 29  21 - 32 mmol/L Final    Anion Gap 04/18/2025 14  10 - 20 mmol/L Final    Urea Nitrogen 04/18/2025 9  6 - 23 mg/dL Final    Creatinine 04/18/2025 2.18 (H)  0.50 - 1.05 mg/dL Final    eGFR 04/18/2025 27 (L)  >60 mL/min/1.73m*2 Final    Calculations of estimated GFR are performed using the 2021 CKD-EPI Study Refit equation without the race variable for the IDMS-Traceable creatinine methods.  https://jasn.asnjournals.org/content/early/2021/09/22/ASN.6353177539    Calcium 04/18/2025 7.8 (L)  8.6 - 10.3 mg/dL Final    Phosphorus 04/18/2025 2.5  2.5 - 4.9 mg/dL Final    The performance characteristics of phosphorus testing in heparinized plasma have been validated by the individual  laboratory site where testing is performed. Testing on heparinized plasma is not approved by the FDA; however, such approval is not necessary.    Albumin 04/18/2025 3.3 (L)  3.4 - 5.0 g/dL Final   Pertinent labs were reviewed      ASSESSMENT:     PSYCHIATRIC RISK ASSESSMENT  Violence Risk Factors:  current psychiatric illness and stress/destabilizers  Acute Risk of Harm to Others is Considered: Low  Suicide Risk Factors: chronic medical illness, chronic pain, and current psychiatric illness  Protective Factors: social support/connectedness and hopefulness/future-orientation  Acute Risk of Harm to Self is Considered: Low    DIAGNOSIS  Assessment & Plan  Right foot infection    Hyperkalemia      Depression  Anxiety    IMPRESSION  49-year-old female with a  complex medical history including ESRD on hemodialysis, multiple cardiac valve replacements, recurrent bacteremia, and seizure disorder, presenting with concern for dialysis catheter infection. She reports worsening depressive and anxiety symptoms over the past several months, including poor sleep, appetite, low energy, and anhedonia. These symptoms appear to be compounded by her chronic medical conditions and ongoing physical discomfort. She denies suicidal or homicidal ideation, hallucinations, or past suicide attempts, and reports feeling safe at home. Patient was previously evaluated by psychiatric services in May 2024 and started on Remeron 15 mg at bedtime. She does not recall taking this medication or the reason for its discontinuation. Given her current symptoms and willingness to engage in treatment, a re-initiation of Remeron was discussed and agreed upon.     PLAN/ RECOMMENDATIONS:     -Start Remeron 7.5 mg po nightly to help with sleep, appetite, and mood/depression. Will titrate up based on response and tolerance   -Atarax 25 mg po every 6 hours as needed ordered for anxiety    - Patient does not currently meet criteria for inpatient psychiatric admission.     Medication Consent  Medication Consent: risks, benefits, side effects reviewed for all ordered meds and patient/caregiver expressed understanding and consent obtained.    -Thank you for allowing us to participate in the care of this patient. Psychiatry will continue to follow.    I personally spent 79 minutes today providing care for this patient, including preparation, face to face time, documentation and other services such as review of medical records, diagnostic result, patient education, counseling, coordination of care as specified in the encounter.            [1]   Family History  Problem Relation Name Age of Onset    No Known Problems Mother      No Known Problems Father     [2] aspirin, 81 mg, oral, Daily  atorvastatin, 40 mg, oral,  Daily  calcitriol, 0.5 mcg, oral, Daily  cloNIDine, 0.1 mg, oral, q8h KIMBERLY  epoetin brandy-epbx, 100 Units/kg, subcutaneous, Once per day on Monday Wednesday Friday  [START ON 4/20/2025] ergocalciferol, 1.25 mg, oral, Every Sunday  heparin, 1,000 Units, intra-catheter, After Dialysis  levETIRAcetam, 750 mg, oral, Nightly  metoprolol tartrate, 25 mg, oral, BID  piperacillin-tazobactam, 2.25 g, intravenous, q8h  pregabalin, 50 mg, oral, BID  sodium zirconium cyclosilicate, 10 g, oral, q8h  [START ON 4/19/2025] vancomycin, 750 mg, intravenous, Once  [3]    [4] PRN medications: acetaminophen, diphenhydrAMINE, HYDROmorphone, hydrOXYzine HCL, [Held by provider] methocarbamol, oxyCODONE-acetaminophen, oxyCODONE-acetaminophen, promethazine, vancomycin

## 2025-04-18 NOTE — POST-PROCEDURE NOTE
..Report to Receiving RN:    Report To: Tayla Lowe Rn.  Time Report given  Hand-Off Communication: Pt. Removed 1.5 liters of fluid, was on machine for 4 hours  Complications During Treatment: No  Ultrafiltration Treatment: No  Medications Administered During Dialysis: Yes  Blood Products Administered During Dialysis: No  Labs Sent During Dialysis: No  Heparin Drip Rate Changes: No  Dialysis Catheter Dressing: clean dry intact  Last Dressing Change: 4/17/2025                Last Updated: 1:32 AM by CAROLINA LOVE

## 2025-04-18 NOTE — PROGRESS NOTES
"Medical Intensive Care - Daily Progress Note   Subjective    Batsheva Page is a 49 y.o. year old female patient admitted on 4/17/2025 with following ICU needs: ***    HPI:     Interval History:  ***    Assessment and Plan      Problem/Injury Exam & Plan Resolved   Neuro Anxiety   Hx seizure disorder Will discuss psychiatric consultation with patient    Pulmonary      CV Hx HTN, HLD     GI      /FEN Severe hyperkalemia  ESRD on HD  Malfunctioning right femoral tunneled dialysis catheter  Concern for dialysis catheter site infection Patient is s/p HD overnight with resolution of hyperkalemia    ID      Endo      Heme Anemia of chronic renal disease     MSK      Prophylaxis      Dispo      Radiology Summary of dick imaging results from the last 24 hours  ***   Lines Line Date Line Date                     Meds    Scheduled medications  Scheduled Medications[1]  Continuous medications  Continuous Medications[2]  PRN medications  PRN Medications[3]     Objective    Blood pressure (!) 164/98, pulse 92, temperature 37.1 °C (98.8 °F), temperature source Temporal, resp. rate 23, height 1.727 m (5' 8\"), weight 70.7 kg (155 lb 13.8 oz), SpO2 99%.     Physical Exam       Intake/Output Summary (Last 24 hours) at 4/18/2025 0809  Last data filed at 4/18/2025 0632  Gross per 24 hour   Intake 2437.5 ml   Output 2167 ml   Net 270.5 ml       CBC, elctrolytes/BMP and pertinant inaging *** reviewed.  Labs:   Results from last 72 hours   Lab Units 04/18/25  0253 04/17/25  1425 04/17/25  1334   SODIUM mmol/L 133* 132* 131*   POTASSIUM mmol/L 4.2 7.6* 8.2*   CHLORIDE mmol/L 94* 92* 90*   CO2 mmol/L 29 29 30   BUN mg/dL 9 41* 41*   CREATININE mg/dL 2.18* 6.95* 6.94*   GLUCOSE mg/dL 117* 85 82   CALCIUM mg/dL 7.8* 6.9* 7.2*   ANION GAP mmol/L 14 19 19   EGFR mL/min/1.73m*2 27* 7* 7*   PHOSPHORUS mg/dL 2.5  --   --       Results from last 72 hours   Lab Units 04/18/25  0253 04/17/25  1334   WBC AUTO x10*3/uL 5.4 5.8   HEMOGLOBIN g/dL " 10.0* 11.1*   HEMATOCRIT % 31.8* 35.4*   PLATELETS AUTO x10*3/uL 129* 166   NEUTROS PCT AUTO %  --  64.6   LYMPHS PCT AUTO %  --  18.7   MONOS PCT AUTO %  --  6.2   EOS PCT AUTO %  --  9.6                 Micro/ID:     Lab Results   Component Value Date    BLOODCULT Loaded on Instrument - Culture in progress 04/17/2025    BLOODCULT Loaded on Instrument - Culture in progress 04/17/2025         ICU Check List       ICU Liberation: Intervention:   Assess, Prevent, Manage Pain CPOT {+/-:43485} ***   Both SAT and SBT [] SAT  [] SBT 30-60 min [] Extubate to NC  [] Extubate to NIV  [] Discuss Trach   Choice of analgesia and sedation RASS: ***  {sedation:10530} ***   Delirium: Assess, prevent and manage CAM {+/-:35881} ***   Early Mobility and Exercise  [] PT /OT consult   Family Engagement and Empowerment []Family updated []SW consult     FEN  Fluids: ***  Electrolytes: ***  Nutrition: ***  Prophylaxis:  DVT ppx: ***  GI ppx: ***  Bowel care: ***  Hardware:         Hemodialysis Cath 12/10/24 Double lumen Right Tunneled catheter Femoral (Active)   Placement Date/Time: 12/10/24 1516   Hand Hygiene Completed: Yes  Site Prep: Chlorhexidine   Site Prep Agent has Completely Dried Before Insertion: Yes  All 5 Sterile Barriers Used (Gloves, Gown, Cap, Mask, Large Sterile Drape): Yes  Local Anesthetic:...   Number of days: 128       Hemodialysis Cath 04/17/25 Left Femoral (Active)   Placement Date/Time: 04/17/25 2050   Hand Hygiene Completed: Yes  Site Prep: Usual sterile procedure followed  Site Prep Agent has Completely Dried Before Insertion: Yes  All 5 Sterile Barriers Used (Gloves, Gown, Cap, Mask, Large Sterile Drape): Yes ...   Number of days: 0       Social:  Code: Full Code    HPOA: ***  Disposition: ***    Lianne Ibarra MD   04/18/25 at 8:09 AM     Disclaimer: Documentation completed with the information available at the time of input. The times in the chart may not be reflective of actual patient care times,  interventions, or procedures. Documentation occurs after the physical care of the patient. This critically ill patient continues to be at-risk for deterioration / failure due to the above  mentioned dysfunctional unstable organ systems.  I have reviewed, evaluated, identified and managed all critical medical problems.  Assessment, impressions and plans are reflected in the note above as well as the orders.  Critical care time is spent at bedside includes review of diagnostic tests, labs, and radiographs, serial assessments and management of hemodynamics, respiratory status, ventilation and coordination of care.  Teaching and any separately billable procedures are not included in the time calculation.     Billing Provider Critical Care Time:  *** minutes           [1] aspirin, 81 mg, oral, Daily  atorvastatin, 40 mg, oral, Daily  calcitriol, 0.5 mcg, oral, Daily  cloNIDine, 0.1 mg, oral, q8h KIMBERLY  epoetin brandy-epbx, 100 Units/kg, subcutaneous, Once per day on Monday Wednesday Friday  [START ON 4/20/2025] ergocalciferol, 1.25 mg, oral, Every Sunday  heparin, 1,000 Units, intra-catheter, After Dialysis  levETIRAcetam, 750 mg, oral, Nightly  metoprolol tartrate, 25 mg, oral, BID  piperacillin-tazobactam, 2.25 g, intravenous, q8h  pregabalin, 50 mg, oral, BID  sodium zirconium cyclosilicate, 10 g, oral, q8h  [START ON 4/19/2025] vancomycin, 750 mg, intravenous, After Dialysis  [2]    [3] PRN medications: acetaminophen, diphenhydrAMINE, HYDROmorphone, hydrOXYzine HCL, [Held by provider] methocarbamol, oxyCODONE-acetaminophen, oxyCODONE-acetaminophen, promethazine, vancomycin

## 2025-04-19 ENCOUNTER — APPOINTMENT (OUTPATIENT)
Dept: DIALYSIS | Facility: HOSPITAL | Age: 50
End: 2025-04-19
Payer: MEDICARE

## 2025-04-19 LAB
ALBUMIN SERPL BCP-MCNC: 3.1 G/DL (ref 3.4–5)
ANION GAP SERPL CALCULATED.3IONS-SCNC: 18 MMOL/L (ref 10–20)
BUN SERPL-MCNC: 26 MG/DL (ref 6–23)
CALCIUM SERPL-MCNC: 6.8 MG/DL (ref 8.6–10.3)
CHLORIDE SERPL-SCNC: 97 MMOL/L (ref 98–107)
CO2 SERPL-SCNC: 26 MMOL/L (ref 21–32)
CREAT SERPL-MCNC: 4.72 MG/DL (ref 0.5–1.05)
EGFRCR SERPLBLD CKD-EPI 2021: 11 ML/MIN/1.73M*2
ERYTHROCYTE [DISTWIDTH] IN BLOOD BY AUTOMATED COUNT: 16.7 % (ref 11.5–14.5)
GLUCOSE SERPL-MCNC: 84 MG/DL (ref 74–99)
HCT VFR BLD AUTO: 31.5 % (ref 36–46)
HGB BLD-MCNC: 9.7 G/DL (ref 12–16)
MAGNESIUM SERPL-MCNC: 1.73 MG/DL (ref 1.6–2.4)
MCH RBC QN AUTO: 30.6 PG (ref 26–34)
MCHC RBC AUTO-ENTMCNC: 30.8 G/DL (ref 32–36)
MCV RBC AUTO: 99 FL (ref 80–100)
NRBC BLD-RTO: 0 /100 WBCS (ref 0–0)
PHOSPHATE SERPL-MCNC: 5.2 MG/DL (ref 2.5–4.9)
PLATELET # BLD AUTO: 129 X10*3/UL (ref 150–450)
POTASSIUM SERPL-SCNC: 6.1 MMOL/L (ref 3.5–5.3)
RBC # BLD AUTO: 3.17 X10*6/UL (ref 4–5.2)
SODIUM SERPL-SCNC: 135 MMOL/L (ref 136–145)
STAPHYLOCOCCUS SPEC CULT: ABNORMAL
WBC # BLD AUTO: 4.9 X10*3/UL (ref 4.4–11.3)

## 2025-04-19 PROCEDURE — 99231 SBSQ HOSP IP/OBS SF/LOW 25: CPT

## 2025-04-19 PROCEDURE — 2500000004 HC RX 250 GENERAL PHARMACY W/ HCPCS (ALT 636 FOR OP/ED): Performed by: NURSE PRACTITIONER

## 2025-04-19 PROCEDURE — 80069 RENAL FUNCTION PANEL: CPT | Performed by: NURSE PRACTITIONER

## 2025-04-19 PROCEDURE — 8010000001 HC DIALYSIS - HEMODIALYSIS PER DAY

## 2025-04-19 PROCEDURE — 2060000001 HC INTERMEDIATE ICU ROOM DAILY

## 2025-04-19 PROCEDURE — 85027 COMPLETE CBC AUTOMATED: CPT | Performed by: NURSE PRACTITIONER

## 2025-04-19 PROCEDURE — 36415 COLL VENOUS BLD VENIPUNCTURE: CPT | Performed by: NURSE PRACTITIONER

## 2025-04-19 PROCEDURE — 99232 SBSQ HOSP IP/OBS MODERATE 35: CPT | Performed by: NURSE PRACTITIONER

## 2025-04-19 PROCEDURE — 83735 ASSAY OF MAGNESIUM: CPT | Performed by: NURSE PRACTITIONER

## 2025-04-19 PROCEDURE — 2500000004 HC RX 250 GENERAL PHARMACY W/ HCPCS (ALT 636 FOR OP/ED): Mod: JZ | Performed by: INTERNAL MEDICINE

## 2025-04-19 PROCEDURE — 2500000001 HC RX 250 WO HCPCS SELF ADMINISTERED DRUGS (ALT 637 FOR MEDICARE OP): Performed by: NURSE PRACTITIONER

## 2025-04-19 PROCEDURE — 2500000002 HC RX 250 W HCPCS SELF ADMINISTERED DRUGS (ALT 637 FOR MEDICARE OP, ALT 636 FOR OP/ED)

## 2025-04-19 PROCEDURE — 2500000001 HC RX 250 WO HCPCS SELF ADMINISTERED DRUGS (ALT 637 FOR MEDICARE OP)

## 2025-04-19 PROCEDURE — 2500000004 HC RX 250 GENERAL PHARMACY W/ HCPCS (ALT 636 FOR OP/ED): Mod: JZ | Performed by: NURSE PRACTITIONER

## 2025-04-19 RX ORDER — DIPHENHYDRAMINE HYDROCHLORIDE 50 MG/ML
50 INJECTION, SOLUTION INTRAMUSCULAR; INTRAVENOUS ONCE
Status: DISCONTINUED | OUTPATIENT
Start: 2025-04-19 | End: 2025-04-19

## 2025-04-19 RX ORDER — HEPARIN SODIUM 1000 [USP'U]/ML
1000 INJECTION, SOLUTION INTRAVENOUS; SUBCUTANEOUS
Status: DISCONTINUED | OUTPATIENT
Start: 2025-04-20 | End: 2025-04-20 | Stop reason: SDUPTHER

## 2025-04-19 RX ORDER — DIPHENHYDRAMINE HYDROCHLORIDE 50 MG/ML
25 INJECTION, SOLUTION INTRAMUSCULAR; INTRAVENOUS ONCE
Status: COMPLETED | OUTPATIENT
Start: 2025-04-19 | End: 2025-04-19

## 2025-04-19 RX ORDER — DEXTROSE 50 % IN WATER (D50W) INTRAVENOUS SYRINGE
25 ONCE
Status: DISCONTINUED | OUTPATIENT
Start: 2025-04-19 | End: 2025-04-19

## 2025-04-19 RX ORDER — CALCIUM GLUCONATE 20 MG/ML
2 INJECTION, SOLUTION INTRAVENOUS ONCE
Status: DISCONTINUED | OUTPATIENT
Start: 2025-04-19 | End: 2025-04-19

## 2025-04-19 RX ADMIN — PIPERACILLIN SODIUM AND TAZOBACTAM SODIUM 2.25 G: 2; .25 INJECTION, SOLUTION INTRAVENOUS at 13:01

## 2025-04-19 RX ADMIN — DIPHENHYDRAMINE HYDROCHLORIDE 25 MG: 50 INJECTION, SOLUTION INTRAMUSCULAR; INTRAVENOUS at 22:30

## 2025-04-19 RX ADMIN — HYDROMORPHONE HYDROCHLORIDE 0.6 MG: 1 INJECTION, SOLUTION INTRAMUSCULAR; INTRAVENOUS; SUBCUTANEOUS at 19:30

## 2025-04-19 RX ADMIN — DIPHENHYDRAMINE HYDROCHLORIDE 25 MG: 50 INJECTION, SOLUTION INTRAMUSCULAR; INTRAVENOUS at 16:16

## 2025-04-19 RX ADMIN — CLONIDINE HYDROCHLORIDE 0.1 MG: 0.2 TABLET ORAL at 06:59

## 2025-04-19 RX ADMIN — HYDROMORPHONE HYDROCHLORIDE 0.6 MG: 1 INJECTION, SOLUTION INTRAMUSCULAR; INTRAVENOUS; SUBCUTANEOUS at 07:51

## 2025-04-19 RX ADMIN — PIPERACILLIN SODIUM AND TAZOBACTAM SODIUM 2.25 G: 2; .25 INJECTION, SOLUTION INTRAVENOUS at 01:05

## 2025-04-19 RX ADMIN — CLONIDINE HYDROCHLORIDE 0.1 MG: 0.2 TABLET ORAL at 21:10

## 2025-04-19 RX ADMIN — VANCOMYCIN 750 MG: 750 INJECTION, SOLUTION INTRAVENOUS at 17:09

## 2025-04-19 RX ADMIN — HYDROMORPHONE HYDROCHLORIDE 0.6 MG: 1 INJECTION, SOLUTION INTRAMUSCULAR; INTRAVENOUS; SUBCUTANEOUS at 00:46

## 2025-04-19 RX ADMIN — CLONIDINE HYDROCHLORIDE 0.1 MG: 0.2 TABLET ORAL at 13:01

## 2025-04-19 RX ADMIN — DIPHENHYDRAMINE HYDROCHLORIDE 25 MG: 50 INJECTION, SOLUTION INTRAMUSCULAR; INTRAVENOUS at 07:51

## 2025-04-19 RX ADMIN — DIPHENHYDRAMINE HYDROCHLORIDE 25 MG: 50 INJECTION, SOLUTION INTRAMUSCULAR; INTRAVENOUS at 19:33

## 2025-04-19 RX ADMIN — DIPHENHYDRAMINE HYDROCHLORIDE 25 MG: 50 INJECTION, SOLUTION INTRAMUSCULAR; INTRAVENOUS at 09:16

## 2025-04-19 RX ADMIN — HYDROMORPHONE HYDROCHLORIDE 0.6 MG: 1 INJECTION, SOLUTION INTRAMUSCULAR; INTRAVENOUS; SUBCUTANEOUS at 16:16

## 2025-04-19 RX ADMIN — DIPHENHYDRAMINE HYDROCHLORIDE 25 MG: 50 INJECTION, SOLUTION INTRAMUSCULAR; INTRAVENOUS at 04:26

## 2025-04-19 RX ADMIN — HYDROMORPHONE HYDROCHLORIDE 0.6 MG: 1 INJECTION, SOLUTION INTRAMUSCULAR; INTRAVENOUS; SUBCUTANEOUS at 22:30

## 2025-04-19 RX ADMIN — DIPHENHYDRAMINE HYDROCHLORIDE 25 MG: 50 INJECTION, SOLUTION INTRAMUSCULAR; INTRAVENOUS at 00:46

## 2025-04-19 RX ADMIN — METOPROLOL TARTRATE 25 MG: 25 TABLET, FILM COATED ORAL at 21:10

## 2025-04-19 RX ADMIN — HYDROMORPHONE HYDROCHLORIDE 0.6 MG: 1 INJECTION, SOLUTION INTRAMUSCULAR; INTRAVENOUS; SUBCUTANEOUS at 13:01

## 2025-04-19 RX ADMIN — MIRTAZAPINE 7.5 MG: 7.5 TABLET, FILM COATED ORAL at 21:10

## 2025-04-19 RX ADMIN — DIPHENHYDRAMINE HYDROCHLORIDE 25 MG: 50 INJECTION, SOLUTION INTRAMUSCULAR; INTRAVENOUS at 13:01

## 2025-04-19 RX ADMIN — PIPERACILLIN SODIUM AND TAZOBACTAM SODIUM 2.25 G: 2; .25 INJECTION, SOLUTION INTRAVENOUS at 21:11

## 2025-04-19 RX ADMIN — HYDROMORPHONE HYDROCHLORIDE 0.6 MG: 1 INJECTION, SOLUTION INTRAMUSCULAR; INTRAVENOUS; SUBCUTANEOUS at 04:26

## 2025-04-19 ASSESSMENT — COGNITIVE AND FUNCTIONAL STATUS - GENERAL
MOBILITY SCORE: 24
DAILY ACTIVITIY SCORE: 24
MOBILITY SCORE: 24
DAILY ACTIVITIY SCORE: 24

## 2025-04-19 ASSESSMENT — PAIN SCALES - GENERAL
PAINLEVEL_OUTOF10: 6
PAINLEVEL_OUTOF10: 10 - WORST POSSIBLE PAIN
PAINLEVEL_OUTOF10: 6
PAINLEVEL_OUTOF10: 10 - WORST POSSIBLE PAIN
PAINLEVEL_OUTOF10: 6
PAINLEVEL_OUTOF10: 10 - WORST POSSIBLE PAIN
PAINLEVEL_OUTOF10: 7
PAINLEVEL_OUTOF10: 7
PAINLEVEL_OUTOF10: 10 - WORST POSSIBLE PAIN
PAINLEVEL_OUTOF10: 5 - MODERATE PAIN
PAINLEVEL_OUTOF10: 10 - WORST POSSIBLE PAIN
PAINLEVEL_OUTOF10: 6

## 2025-04-19 ASSESSMENT — PAIN DESCRIPTION - DESCRIPTORS
DESCRIPTORS: DISCOMFORT
DESCRIPTORS: DISCOMFORT
DESCRIPTORS: ACHING;DISCOMFORT
DESCRIPTORS: DISCOMFORT
DESCRIPTORS: ACHING;DISCOMFORT
DESCRIPTORS: ACHING;DISCOMFORT
DESCRIPTORS: DISCOMFORT

## 2025-04-19 ASSESSMENT — PAIN DESCRIPTION - LOCATION
LOCATION: GENERALIZED
LOCATION: GROIN
LOCATION: GENERALIZED
LOCATION: GROIN
LOCATION: GROIN

## 2025-04-19 ASSESSMENT — PAIN DESCRIPTION - ORIENTATION
ORIENTATION: LEFT;RIGHT
ORIENTATION: LEFT;RIGHT
ORIENTATION: RIGHT;LEFT;MID
ORIENTATION: LEFT;RIGHT

## 2025-04-19 ASSESSMENT — PAIN SCALES - PAIN ASSESSMENT IN ADVANCED DEMENTIA (PAINAD): TOTALSCORE: MEDICATION (SEE MAR)

## 2025-04-19 NOTE — NURSING NOTE
End of shift, bedside shift report given. Patient laying in bed resting comfortably, with call light within reach. Patient verbalizes no new concerns at this time.  Bed at lowest position.

## 2025-04-19 NOTE — POST-PROCEDURE NOTE
Report to Receiving RN:    Report To: Zainab Lu  Time Report Called: [id number]  Hand-Off Communication: Abhi  Complications During Treatment: No  Ultrafiltration Treatment: No  Medications Administered During Dialysis: Yes Benadryl  Blood Products Administered During Dialysis: No  Labs Sent During Dialysis: No  Heparin Drip Rate Changes: No  Dialysis Catheter Dressing: Nereida, dry, and intact  Last Dressing Change: 4/19/2025    Electronic Signatures:  Rhett Santo    Last Updated: 12:52 PM by RHETT SANTO

## 2025-04-19 NOTE — CARE PLAN
Problem: Pain - Adult  Goal: Verbalizes/displays adequate comfort level or baseline comfort level  Outcome: Progressing     Problem: Safety - Adult  Goal: Free from fall injury  Outcome: Progressing     Problem: Discharge Planning  Goal: Discharge to home or other facility with appropriate resources  Outcome: Progressing     Problem: Chronic Conditions and Co-morbidities  Goal: Patient's chronic conditions and co-morbidity symptoms are monitored and maintained or improved  Outcome: Progressing     Problem: Nutrition  Goal: Nutrient intake appropriate for maintaining nutritional needs  Outcome: Progressing     Problem: Pain  Goal: Takes deep breaths with improved pain control throughout the shift  Outcome: Progressing  Goal: Turns in bed with improved pain control throughout the shift  Outcome: Progressing  Goal: Walks with improved pain control throughout the shift  Outcome: Progressing  Goal: Performs ADL's with improved pain control throughout shift  Outcome: Progressing  Goal: Participates in PT with improved pain control throughout the shift  Outcome: Progressing  Goal: Free from opioid side effects throughout the shift  Outcome: Progressing  Goal: Free from acute confusion related to pain meds throughout the shift  Outcome: Progressing   The patient's goals for the shift include      The clinical goals for the shift include manage pain, remove tunneled HD cath

## 2025-04-19 NOTE — PROGRESS NOTES
Batsheva Page is a 49 y.o. female on day 2 of admission with a PMHx of ESRD on dialysis for 21 years, recurrent MSSA/MRSA bacteremia, endocarditis s/p aortic and mitral valve replacement, HTN, HLD, seizure disorder, and hyponatremia presenting with concern of dialysis catheter infection. Patient noted her dialysis catheter has not been working intermittently and her sessions have been being interrupted.     In ED: Patient with significant hyperkalemia, Potassium 8.2 despite being dialyzed yesterday. Afebrile and normotensive. CBC unremarkable, no leukocytosis. Lactate normal. Hyperkalemia treated initially with insulin, dextrose, and calcium and improved to 7.6. Patient taken to dialysis emergently, though could only run for ~30 minutes due to right femoral tunneled line malfunctioning. Vanc/Zosyn given due to concern for infection. Patient admitted to ICU for temporary hemodialysis catheter placement. Consult placed to IR for tunneled dialysis catheter exchange.     4/18: Patient [facility] to [facility].           Subjective   Patient seen and examined, resting in bed after dialysis. Patient reported having mild headache believing to be related to her blood pressure being elevated. Reports her nurse gave her medication for pain recently. Patient describing generalized pain, rating it an 8/10 currently. Reports difficulty sleeping and decreased appetite. Denies fever, chills, nausea, vomiting, diarrhea, chest pain, and shortness of breath.      Objective     Last Recorded Vitals  BP (!) 140/114 (BP Location: Right arm, Patient Position: Lying) Comment: FARAZ Torres aware of patient's high bp reading  Pulse 84   Temp 36.5 °C (97.7 °F) (Oral)   Resp 19   Wt 71 kg (156 lb 8.4 oz)   SpO2 100%   Intake/Output last 3 Shifts:    Intake/Output Summary (Last 24 hours) at 4/19/2025 1433  Last data filed at 4/19/2025 1348  Gross per 24 hour   Intake 1042 ml   Output 2656 ml   Net -1614 ml       Admission Weight  Weight: 67  kg (147 lb 11.3 oz) (04/17/25 1226)    Daily Weight  04/19/25 : 71 kg (156 lb 8.4 oz)    Image Results  ECG 12 lead  Normal sinus rhythm  Anterior infarct , age undetermined  Abnormal ECG  When compared with ECG of 14-DEC-2024 09:53,  Anterior infarct is now Present  T wave amplitude has increased in Lateral leads  Confirmed by Sade Solomon ([id number]) on 4/18/2025 1:03:27 PM      Physical Exam  Constitutional:       Appearance: She is not ill-appearing.   HENT:      Head: Normocephalic and atraumatic.      Mouth/Throat:      Mouth: Mucous membranes are moist.   Eyes:      Extraocular Movements: Extraocular movements intact.      Pupils: Pupils are equal, round, and reactive to light.   Cardiovascular:      Rate and Rhythm: Normal rate and regular rhythm.   Pulmonary:      Effort: Pulmonary effort is normal.      Breath sounds: Normal breath sounds.   Abdominal:      General: Bowel sounds are normal.   Musculoskeletal:         General: Normal range of motion.      Cervical back: Normal range of motion.   Skin:     General: Skin is warm and dry.      Comments: Right groin dialysis catheter with edema, erythema, and tenderness. Left groin HD site without signs of infection   Neurological:      Mental Status: She is alert and oriented to person, place, and time.   Psychiatric:         Mood and Affect: Mood normal.         Behavior: Behavior normal.         Relevant Results  Lab Results   Component Value Date    GLUCOSE 84 04/19/2025    CALCIUM 6.8 (L) 04/19/2025     (L) 04/19/2025    K 6.1 (HH) 04/19/2025    CO2 26 04/19/2025    CL 97 (L) 04/19/2025    BUN 26 (H) 04/19/2025    CREATININE 4.72 (H) 04/19/2025      Lab Results   Component Value Date    WBC 4.9 04/19/2025    HGB 9.7 (L) 04/19/2025    HCT 31.5 (L) 04/19/2025    MCV 99 04/19/2025     (L) 04/19/2025                This patient has a central line   Reason for the central line remaining today? Dialysis/Hemapheresis            Assessment &  Plan  Hyperkalemia    Dialysis catheter site infection  Patient does have a history of bacteremia and endocarditis  Right groin dialysis catheter with erythema, edema, tenderness, drainage  Blood cultures negative to date  Continue IV antibiotics  Follow cultures  Right groin dialysis catheter in place however nonfunctioning and infected, will need removed  Temporary catheter has been placed in the left groin without signs or symptoms of infection currently.  This will need exchanged with permanent dialysis catheter prior to discharge.  ID recommending resuming vancomycin dwell after each dialysis session this  Pain management    ESRD on HD  Right groin dialysis catheter infected and nonfunctioning, will need removed prior to discharge  Left femoral temporary HD catheter placed 4/17  Potassium 6.1 this morning - STAT HD ordered  Continue home Retacrit, Calcitrol  IR consulted for tunneled line exchange  Nephrology consulted  Receives Benadryl prior to HD for itching, continue    HTN  Continue home metoprolol and clonidine  Monitor blood pressure close    Coronary artery disease  Continue aspirin and statin    Seizure  Seizure precautions  Continue Keppra     Anxiety/depression  Psych consulted, appreciate recs  Remeron and Atarax ordered    Anemia secondary to chronic renal disease  Continue home Retacrit  Transfuse for Hgb > 7        PLAN:  DVT prophylaxis: SCDs, patient had declined pharmacologic prophylaxis  Renal diet  Antibiotics  Follow cultures  IR consulted for tunneled line exchange  CBC and BMP in the morning  Monitor on telemetry  Hemodialysis per nephrology    CODE STATUS: FULL CODE      Vania Guevara, RN, APRN student     NP Attestation: I was present with the NPstudent who participated in the documentation of this note. I have personally seen and re-examined the patient and performed the medical decision-making components (assessment and plan of care). I have reviewed the NP student documentation  and verified the findings in the note as written with additions or exceptions as stated in the body of this note.

## 2025-04-19 NOTE — CONSULTS
INFECTIOUS DISEASES CONSULTATION NOTE      Referred by SAMY Greco MD    Reason For Consult  Infected dialysis catheter    History Of Present Illness  Batsheva Page is a 49 y.o. female with whom I am very well acquainted from her multiple previous admissions to this hospital related to infected dialysis catheter and Staph aureus bacteremia.  She has previously undergone both aortic and mitral valve replacement.  She has extremely limited options for vascular access and her most recent episode of catheter sepsis occurred in December 2024 when she was treated at [facility].  At that time a new tunneled dialysis catheter was placed in the right femoral position.  She was admitted to this hospital on 4/17 complaining of pain and swelling around the dialysis catheter and subsequently catheter dysfunction and ineffective dialysis.  She had hyperkalemia.  A new temporary dialysis catheter was placed in the left femoral position.  She remains free of fever and chills and she has not had any positive blood cultures during this admission.  Apparently because of scheduling and logistical issues, the non-functioning right femoral dialysis catheter remains in place.    Past Medical History  Aortic and mitral valve replacements  Coronary artery disease   History of seizures  Chronic renal failure     Social History  Does not abuse alcohol or tobacco    Family History  Not pertinent to the current question    Allergies  Kayexalate, Metoclopramide hcl, Prochlorperazine, Zofran [ondansetron hcl], and Ondansetron     Review of Systems  Detailed review of systems completed.  No significant additional positives beyond what is mentioned above    Physical Exam  Vital signs:  Visit Vitals  /80 (BP Location: Right arm, Patient Position: Lying)   Pulse 77   Temp 36.5 °C (97.7 °F) (Oral)   Resp 16      General: Resting comfortably, no acute distress  HEENT:  No scleral icterus or conjunctival suffusion, oral mucosa  "moist  Nodes:  Negative  Lungs:  Clear to auscultation  Breasts:  Not examined  Heart:  S1, S2 crisp, systolic ejection murmur appreciated at apex and base.  No diastolic murmur  Abdomen:  Soft, nontender. No palpable organs or masses  Back:  No spinal or CVA tenderness  Genitalia:  Not examined  Extremities: Nonfunctioning tunneled dialysis catheter in the right femoral position.  Mild erythema, induration, tenderness.  Temporary left femoral dialysis catheter in place  Neurologic:  Alert.  Grossly non-focal.     Relevant Results  Results from last 72 hours   Lab Units 04/19/25  0430 04/18/25  0253 04/17/25  1334   WBC AUTO x10*3/uL 4.9   < > 5.8   HEMOGLOBIN g/dL 9.7*   < > 11.1*   HEMATOCRIT % 31.5*   < > 35.4*   PLATELETS AUTO x10*3/uL 129*   < > 166   NEUTROS PCT AUTO %  --   --  64.6   LYMPHS PCT AUTO %  --   --  18.7   MONOS PCT AUTO %  --   --  6.2   EOS PCT AUTO %  --   --  9.6    < > = values in this interval not displayed.     Results from last 72 hours   Lab Units 04/19/25  0430   CREATININE mg/dL 4.72*   ANION GAP mmol/L 18   EGFR mL/min/1.73m*2 11*     Results from last 72 hours   Lab Units 04/17/25  1334   AST U/L 17   ALT U/L 9   ALK PHOS U/L 68   BILIRUBIN TOTAL mg/dL 0.5     Microbiology:  Blood (4/16): Negative X2  Blood (4/17): Negative X2      ASSESSMENT:  Nonfunctioning femoral dialysis catheter  Patient has an extensive history of nonfunctioning and infected dialysis catheters and has had multiple episodes of high-grade MRSA bacteremia.  Most recently, she has been treated with a 5 mg/mL vancomycin \"dwell\" after each dialysis session.  At this time there is no evidence for catheter-associated bacteremia    PLANS:  -   Continue Zosyn and post-dialysis vancomycin pending removal of the nonfunctioning right femoral dialysis catheter  -   Suggest continuing vancomycin 5 mg/mL \"12\" after each dialysis session     THANK YOU FOR ASKING ME TO ASSIST YOU AGAIN IN THE CARE OF YOUR PATIENT    Adama PABLO " MD Brittany  [facility] [employer]  Office:  [phone]

## 2025-04-19 NOTE — ASSESSMENT & PLAN NOTE
Dialysis catheter site infection  Patient does have a history of bacteremia and endocarditis  Right groin dialysis catheter with erythema, edema, tenderness, drainage  Blood cultures negative to date  Continue IV antibiotics  Follow cultures  Right groin dialysis catheter in place however nonfunctioning and infected, will need removed  Temporary catheter has been placed in the left groin without signs or symptoms of infection currently.  This will need exchanged with permanent dialysis catheter prior to discharge.  ID recommending resuming vancomycin dwell after each dialysis session this  Pain management    ESRD on HD  Right groin dialysis catheter infected and nonfunctioning, will need removed prior to discharge  Left femoral temporary HD catheter placed 4/17  Potassium 6.1 this morning - STAT HD ordered  Continue home Retacrit, Calcitrol  IR consulted for tunneled line exchange  Nephrology consulted  Receives Benadryl prior to HD for itching, continue    HTN  Continue home metoprolol and clonidine  Monitor blood pressure close    Coronary artery disease  Continue aspirin and statin    Seizure  Seizure precautions  Continue Keppra     Anxiety/depression  Psych consulted, appreciate recs  Remeron and Atarax ordered    Anemia secondary to chronic renal disease  Continue home Retacrit  Transfuse for Hgb > 7        PLAN:  DVT prophylaxis: SCDs, patient had declined pharmacologic prophylaxis  Renal diet  Antibiotics  Follow cultures  IR consulted for tunneled line exchange  CBC and BMP in the morning  Monitor on telemetry  Hemodialysis per nephrology    CODE STATUS: FULL CODE

## 2025-04-19 NOTE — PROGRESS NOTES
"Batsheva Page is a 49 y.o. female on day 2 of admission presenting with Right foot infection.      Subjective   Patient's chart was reviewed and case was discussed with nursing. Nursing reported that patient has to go for stat dialysis because her potassium level was 6.1. Nursing noted that patient is calm, cooperative and compliant with her medications. Patient was seen for follow up while receiving dialysis, lying in bed. Patient continues to deny suicidal/homicidal ideations as well as auditory and visual hallucinations. Her reported mood is \"ok.\" Patient mentioned she slept well last night. No adverse reactions form Remeron reported.        Objective     Last Recorded Vitals  Blood pressure (!) 183/91, pulse 59, temperature 36.2 °C (97.2 °F), temperature source Temporal, resp. rate 17, height 1.727 m (5' 8\"), weight 71 kg (156 lb 8.4 oz), SpO2 99%.    Sleep Log  No Safety Checks orders active in given range     Review of Systems    Psychiatric ROS - Adult  Depression: fatigue or loss of energy, markedly diminished interest or pleasure in all or most activities, and changes in appetite or weight leading to significant weight loss or gain unintentionally   Anxiety: restlessness or feeling keyed up or on edge and irritability  Suzanne: negative  Psychosis: negative  Delirium: negative   Trauma: negative    Physical Exam    Mental Status Exam  General: 49 years old female, lying in bed during interview.  Appearance: Appeared as age stated; appropriately dressed/groomed.  Attitude:  calm, cooperative  Behavior: Fair EC; overall responding appropriately  Motor Activity: No notable boby PMAR  Speech: Clear, with fair phonation, and no lisp nor dysarthria.   Mood: \"ok\"  Affect: Dysthymic; constricted range/intensity; appropriate and congruent  Thought Process: Linear and logical; not perseverating   Thought Content: Denied SI/HI. Not voicing/endorsing delusions.  Thought Perception: Did not appear to be responding " to internal stimuli. Not endorsing AVH  Cognition: Grossly intact; A&O x4/4 to self, place, date, and context.  Insight: Good as evidenced by patient's awareness and understanding of symptoms and benefit of treatment  Judgement: good, compliant with her scheduled medications    Psychiatric Risk Assessment  Violence Risk Factors:  current psychiatric illness and stress/destabilizers  Acute Risk of Harm to Others is Considered: Low  Suicide Risk Factors: chronic medical illness, chronic pain, and current psychiatric illness  Protective Factors: social support/connectedness and hopefulness/future-orientation  Acute Risk of Harm to Self is Considered: Low     Relevant Results  Results for orders placed or performed during the hospital encounter of 04/17/25 (from the past 96 hours)   Blood Culture    Specimen: Peripheral Venipuncture; Blood culture   Result Value Ref Range    Blood Culture No growth at 1 day    Blood Culture    Specimen: Peripheral Venipuncture; Blood culture   Result Value Ref Range    Blood Culture No growth at 1 day    CBC and Auto Differential   Result Value Ref Range    WBC 5.8 4.4 - 11.3 x10*3/uL    nRBC 0.0 0.0 - 0.0 /100 WBCs    RBC 3.60 (L) 4.00 - 5.20 x10*6/uL    Hemoglobin 11.1 (L) 12.0 - 16.0 g/dL    Hematocrit 35.4 (L) 36.0 - 46.0 %    MCV 98 80 - 100 fL    MCH 30.8 26.0 - 34.0 pg    MCHC 31.4 (L) 32.0 - 36.0 g/dL    RDW 16.5 (H) 11.5 - 14.5 %    Platelets 166 150 - 450 x10*3/uL    Neutrophils % 64.6 40.0 - 80.0 %    Immature Granulocytes %, Automated 0.2 0.0 - 0.9 %    Lymphocytes % 18.7 13.0 - 44.0 %    Monocytes % 6.2 2.0 - 10.0 %    Eosinophils % 9.6 0.0 - 6.0 %    Basophils % 0.7 0.0 - 2.0 %    Neutrophils Absolute 3.77 1.20 - 7.70 x10*3/uL    Immature Granulocytes Absolute, Automated 0.01 0.00 - 0.70 x10*3/uL    Lymphocytes Absolute 1.09 (L) 1.20 - 4.80 x10*3/uL    Monocytes Absolute 0.36 0.10 - 1.00 x10*3/uL    Eosinophils Absolute 0.56 0.00 - 0.70 x10*3/uL    Basophils Absolute 0.04  0.00 - 0.10 x10*3/uL   Comprehensive metabolic panel   Result Value Ref Range    Glucose 82 74 - 99 mg/dL    Sodium 131 (L) 136 - 145 mmol/L    Potassium 8.2 (HH) 3.5 - 5.3 mmol/L    Chloride 90 (L) 98 - 107 mmol/L    Bicarbonate 30 21 - 32 mmol/L    Anion Gap 19 10 - 20 mmol/L    Urea Nitrogen 41 (H) 6 - 23 mg/dL    Creatinine 6.94 (H) 0.50 - 1.05 mg/dL    eGFR 7 (L) >60 mL/min/1.73m*2    Calcium 7.2 (L) 8.6 - 10.3 mg/dL    Albumin 3.8 3.4 - 5.0 g/dL    Alkaline Phosphatase 68 33 - 110 U/L    Total Protein 8.6 (H) 6.4 - 8.2 g/dL    AST 17 9 - 39 U/L    Bilirubin, Total 0.5 0.0 - 1.2 mg/dL    ALT 9 7 - 45 U/L   Lactate   Result Value Ref Range    Lactate 0.6 0.4 - 2.0 mmol/L   Basic metabolic panel   Result Value Ref Range    Glucose 85 74 - 99 mg/dL    Sodium 132 (L) 136 - 145 mmol/L    Potassium 7.6 (HH) 3.5 - 5.3 mmol/L    Chloride 92 (L) 98 - 107 mmol/L    Bicarbonate 29 21 - 32 mmol/L    Anion Gap 19 10 - 20 mmol/L    Urea Nitrogen 41 (H) 6 - 23 mg/dL    Creatinine 6.95 (H) 0.50 - 1.05 mg/dL    eGFR 7 (L) >60 mL/min/1.73m*2    Calcium 6.9 (L) 8.6 - 10.3 mg/dL   POCT GLUCOSE   Result Value Ref Range    POCT Glucose 82 74 - 99 mg/dL   ECG 12 lead   Result Value Ref Range    Ventricular Rate 73 BPM    Atrial Rate 73 BPM    CO Interval 202 ms    QRS Duration 74 ms    QT Interval 392 ms    QTC Calculation(Bazett) 431 ms    P Axis 55 degrees    R Axis -12 degrees    T Axis 55 degrees    QRS Count 13 beats    Q Onset 223 ms    P Onset 122 ms    P Offset 178 ms    T Offset 419 ms    QTC Fredericia 418 ms   CBC   Result Value Ref Range    WBC 5.4 4.4 - 11.3 x10*3/uL    nRBC 0.0 0.0 - 0.0 /100 WBCs    RBC 3.25 (L) 4.00 - 5.20 x10*6/uL    Hemoglobin 10.0 (L) 12.0 - 16.0 g/dL    Hematocrit 31.8 (L) 36.0 - 46.0 %    MCV 98 80 - 100 fL    MCH 30.8 26.0 - 34.0 pg    MCHC 31.4 (L) 32.0 - 36.0 g/dL    RDW 16.3 (H) 11.5 - 14.5 %    Platelets 129 (L) 150 - 450 x10*3/uL   Magnesium   Result Value Ref Range    Magnesium 1.62 1.60 -  2.40 mg/dL   Renal Function Panel   Result Value Ref Range    Glucose 117 (H) 74 - 99 mg/dL    Sodium 133 (L) 136 - 145 mmol/L    Potassium 4.2 3.5 - 5.3 mmol/L    Chloride 94 (L) 98 - 107 mmol/L    Bicarbonate 29 21 - 32 mmol/L    Anion Gap 14 10 - 20 mmol/L    Urea Nitrogen 9 6 - 23 mg/dL    Creatinine 2.18 (H) 0.50 - 1.05 mg/dL    eGFR 27 (L) >60 mL/min/1.73m*2    Calcium 7.8 (L) 8.6 - 10.3 mg/dL    Phosphorus 2.5 2.5 - 4.9 mg/dL    Albumin 3.3 (L) 3.4 - 5.0 g/dL   CBC   Result Value Ref Range    WBC 4.9 4.4 - 11.3 x10*3/uL    nRBC 0.0 0.0 - 0.0 /100 WBCs    RBC 3.17 (L) 4.00 - 5.20 x10*6/uL    Hemoglobin 9.7 (L) 12.0 - 16.0 g/dL    Hematocrit 31.5 (L) 36.0 - 46.0 %    MCV 99 80 - 100 fL    MCH 30.6 26.0 - 34.0 pg    MCHC 30.8 (L) 32.0 - 36.0 g/dL    RDW 16.7 (H) 11.5 - 14.5 %    Platelets 129 (L) 150 - 450 x10*3/uL   Renal Function Panel   Result Value Ref Range    Glucose 84 74 - 99 mg/dL    Sodium 135 (L) 136 - 145 mmol/L    Potassium 6.1 (HH) 3.5 - 5.3 mmol/L    Chloride 97 (L) 98 - 107 mmol/L    Bicarbonate 26 21 - 32 mmol/L    Anion Gap 18 10 - 20 mmol/L    Urea Nitrogen 26 (H) 6 - 23 mg/dL    Creatinine 4.72 (H) 0.50 - 1.05 mg/dL    eGFR 11 (L) >60 mL/min/1.73m*2    Calcium 6.8 (L) 8.6 - 10.3 mg/dL    Phosphorus 5.2 (H) 2.5 - 4.9 mg/dL    Albumin 3.1 (L) 3.4 - 5.0 g/dL   Magnesium   Result Value Ref Range    Magnesium 1.73 1.60 - 2.40 mg/dL   Pertinent labs were reviewed    Scheduled medications  Scheduled Medications[1]  Continuous medications  Continuous Medications[2]  PRN medications  PRN Medications[3]    Assessment & Plan  Right foot infection    Hyperkalemia    Depression  Anxiety     IMPRESSION  49-year-old female with a complex medical history including ESRD on hemodialysis, multiple cardiac valve replacements, recurrent bacteremia, and seizure disorder, presenting with concern for dialysis catheter infection. She reports worsening depressive and anxiety symptoms over the past several months,  including poor sleep, appetite, low energy, and anhedonia. These symptoms appear to be compounded by her chronic medical conditions and ongoing physical discomfort. She denies suicidal or homicidal ideation, hallucinations, or past suicide attempts, and reports feeling safe at home. Patient was previously evaluated by psychiatric services in May 2024 and started on Remeron 15 mg at bedtime. She does not recall taking this medication or the reason for its discontinuation. Given her current symptoms and willingness to engage in treatment, a re-initiation of Remeron was discussed and agreed upon.   04/19/25 Denies current SI/HI, AH/VH. Reports sleeping well last night. No adverse reactions from Remeron reported.      PLAN/ RECOMMENDATIONS:      -Continue Remeron 7.5 mg po nightly to help with sleep, appetite, and mood/depression. Will titrate up based on response and tolerance   -Atarax 25 mg po every 6 hours as needed ordered for anxiety     - Patient does not currently meet criteria for inpatient psychiatric admission.      Medication Consent  Medication Consent: risks, benefits, side effects reviewed for all ordered meds and patient/caregiver expressed understanding and consent obtained.     -Thank you for allowing us to participate in the care of this patient. Psychiatry will continue to follow.         I spent 30 minutes in the professional and overall care of this patient.      ORI Michael-CNP           [1] aspirin, 81 mg, oral, Daily  atorvastatin, 40 mg, oral, Daily  calcitriol, 0.5 mcg, oral, Daily  cloNIDine, 0.1 mg, oral, q8h KIMBERLY  epoetin brandy-epbx, 100 Units/kg, subcutaneous, Once per day on Monday Wednesday Friday  [START ON 4/20/2025] ergocalciferol, 1.25 mg, oral, Every Sunday  [START ON 4/20/2025] heparin, 1,000 Units, intra-catheter, After Dialysis  heparin, 1,000 Units, intra-catheter, After Dialysis  levETIRAcetam, 750 mg, oral, Nightly  metoprolol tartrate, 25 mg, oral, BID  mirtazapine, 7.5  mg, oral, Nightly  piperacillin-tazobactam, 2.25 g, intravenous, q8h  pregabalin, 50 mg, oral, BID  sodium zirconium cyclosilicate, 10 g, oral, q8h  vancomycin, 750 mg, intravenous, Once  [2]    [3] PRN medications: acetaminophen, diphenhydrAMINE, HYDROmorphone, hydrOXYzine HCL, [Held by provider] methocarbamol, oxyCODONE-acetaminophen, oxyCODONE-acetaminophen, promethazine, sodium chloride, vancomycin

## 2025-04-19 NOTE — PROGRESS NOTES
"Batsheva Page is a 49 y.o. female on day 2 of admission presenting with Right foot infection.      Subjective   Patient is being seen for ESRD, hyperkalemia and infected femoral tunneled dialysis catheter on admission. Received urgent hemodialysis yesterday, currently also receiving dialysis with no issues. Has no complains.       Objective     Physical Exam  Constitutional:       General: She is awake. She is not in acute distress.  Cardiovascular:      Rate and Rhythm: Regular rhythm.      Heart sounds:      No friction rub.   Pulmonary:      Effort: Pulmonary effort is normal.      Breath sounds: Normal breath sounds.   Abdominal:      General: Bowel sounds are normal.      Palpations: Abdomen is soft.      Tenderness: There is no guarding or rebound.   Musculoskeletal:      Comments: Trace edema   Neurological:      Mental Status: She is alert.        Intake/Output last 3 Shifts:    Intake/Output Summary (Last 24 hours) at 4/19/2025 1023  Last data filed at 4/19/2025 0900  Gross per 24 hour   Intake 622 ml   Output --   Net 622 ml     Visit Vitals  /85   Pulse 62   Temp 36.2 °C (97.2 °F) (Temporal)   Resp 17   Ht 1.727 m (5' 8\")   Wt 71 kg (156 lb 8.4 oz)   SpO2 99%   BMI 23.80 kg/m²   OB Status Menopausal   Smoking Status Never   BSA 1.85 m²      Current Medications[1]     Results for orders placed or performed during the hospital encounter of 04/17/25 (from the past 24 hours)   CBC   Result Value Ref Range    WBC 4.9 4.4 - 11.3 x10*3/uL    nRBC 0.0 0.0 - 0.0 /100 WBCs    RBC 3.17 (L) 4.00 - 5.20 x10*6/uL    Hemoglobin 9.7 (L) 12.0 - 16.0 g/dL    Hematocrit 31.5 (L) 36.0 - 46.0 %    MCV 99 80 - 100 fL    MCH 30.6 26.0 - 34.0 pg    MCHC 30.8 (L) 32.0 - 36.0 g/dL    RDW 16.7 (H) 11.5 - 14.5 %    Platelets 129 (L) 150 - 450 x10*3/uL   Renal Function Panel   Result Value Ref Range    Glucose 84 74 - 99 mg/dL    Sodium 135 (L) 136 - 145 mmol/L    Potassium 6.1 (HH) 3.5 - 5.3 mmol/L    Chloride 97 (L) 98 " - 107 mmol/L    Bicarbonate 26 21 - 32 mmol/L    Anion Gap 18 10 - 20 mmol/L    Urea Nitrogen 26 (H) 6 - 23 mg/dL    Creatinine 4.72 (H) 0.50 - 1.05 mg/dL    eGFR 11 (L) >60 mL/min/1.73m*2    Calcium 6.8 (L) 8.6 - 10.3 mg/dL    Phosphorus 5.2 (H) 2.5 - 4.9 mg/dL    Albumin 3.1 (L) 3.4 - 5.0 g/dL   Magnesium   Result Value Ref Range    Magnesium 1.73 1.60 - 2.40 mg/dL      Assessment & Plan    End-stage renal disease on hemodialysis-  receiving dialysis today. Ensure renal diet.   Hyperkalemia-potassium 6.1 today,, receiving dialysis, also on lokelma 10 grams every eight hours for 6 doses. Monitor levels closely, ensure renal diet.    Infection of dialysis catheter site and malfunctioning dialysis catheter- IR consulted for tunneled dialysis catheter exchange, please exchange as soon as possible         ANGELA Cheatham           [1]   Current Facility-Administered Medications:     acetaminophen (Tylenol) tablet 650 mg, 650 mg, oral, q6h PRN, ANGELA Kincaid    aspirin EC tablet 81 mg, 81 mg, oral, Daily, ORI Kincaid-CNP, 81 mg at 04/18/25 0941    atorvastatin (Lipitor) tablet 40 mg, 40 mg, oral, Daily, ORI Kincaid-CNP, 40 mg at 04/18/25 0941    calcitriol (Rocaltrol) capsule 0.5 mcg, 0.5 mcg, oral, Daily, ORI Kincaid-CNP    cloNIDine (Catapres) tablet 0.1 mg, 0.1 mg, oral, q8h KIMBERLY, ORI Kincaid-CNP, 0.1 mg at 04/19/25 0659    diphenhydrAMINE (BENADryl) injection 25 mg, 25 mg, intravenous, q3h PRN, ORI Kincaid-CNP, 25 mg at 04/19/25 0751    epoetin brandy-epbx (Retacrit) injection 6,440 Units, 100 Units/kg, subcutaneous, Once per day on Monday Wednesday Friday, ANGELA Kincaid, 6,440 Units at 04/18/25 1850    [START ON 4/20/2025] ergocalciferol (Vitamin D-2) capsule 1.25 mg, 1.25 mg, oral, Every Sunday, ANGELA Kincaid    [START ON 4/20/2025] heparin 1,000 unit/mL injection 1,000 Units, 1,000 Units, intra-catheter, After  Dialysis, Vu Pedersen MD    heparin 1,000 unit/mL injection 1,000 Units, 1,000 Units, intra-catheter, After Dialysis, ANGELA Kincaid    HYDROmorphone (Dilaudid) injection 0.6 mg, 0.6 mg, intravenous, q3h PRN, ORI Kincaid-CNP, 0.6 mg at 04/19/25 0751    hydrOXYzine HCL (Atarax) tablet 25 mg, 25 mg, oral, q6h PRN, ANGELA Michael    levETIRAcetam (Keppra) tablet 750 mg, 750 mg, oral, Nightly, ORI Kincaid-CNP, 750 mg at 04/18/25 2125    [Held by provider] methocarbamol (Robaxin) tablet 750 mg, 750 mg, oral, q6h PRN, Gloria Carrion PA-C    metoprolol tartrate (Lopressor) tablet 25 mg, 25 mg, oral, BID, ORI Kincaid-CNP, 25 mg at 04/18/25 2126    mirtazapine (Remeron) tablet 7.5 mg, 7.5 mg, oral, Nightly, ANGELA Michael, 7.5 mg at 04/18/25 2125    oxyCODONE-acetaminophen (Percocet)  mg per tablet 1 tablet, 1 tablet, oral, q6h PRN, ORI Kincaid-CNP, 1 tablet at 04/18/25 0632    oxyCODONE-acetaminophen (Percocet) 5-325 mg per tablet 1 tablet, 1 tablet, oral, q6h PRN, ORI Kincaid-CNP    piperacillin-tazobactam (Zosyn) 2.25 g in dextrose (iso) IV 50 mL, 2.25 g, intravenous, q8h, ORI Kincaid-CNP, Stopped at 04/19/25 0108    pregabalin (Lyrica) capsule 50 mg, 50 mg, oral, BID, ORI Kincaid-CNP, 50 mg at 04/18/25 2125    promethazine (Phenergan) tablet 25 mg, 25 mg, oral, Daily PRN, ANGELA Kincaid, 25 mg at 04/18/25 0514    sodium chloride 0.9 % bolus 200 mL, 200 mL, intravenous, q1h PRN, Vu Pedersen MD    sodium zirconium cyclosilicate (Lokelma) packet 10 g, 10 g, oral, q8h, Gloria Carrion PA-C    vancomycin (Vancocin) pharmacy to dose - pharmacy monitoring, , miscellaneous, Daily PRN, ORI Kincaid-CNP    vancomycin (Xellia) 750 mg in diluent combination  mL, 750 mg, intravenous, Once, ORI Kincaid-SAL     Double Island Pedicle Flap Text: The defect edges were debeveled with a #15 scalpel blade.  Given the location of the defect, shape of the defect and the proximity to free margins a double island pedicle advancement flap was deemed most appropriate.  Using a sterile surgical marker, an appropriate advancement flap was drawn incorporating the defect, outlining the appropriate donor tissue and placing the expected incisions within the relaxed skin tension lines where possible.    The area thus outlined was incised deep to adipose tissue with a #15 scalpel blade.  The skin margins were undermined to an appropriate distance in all directions around the primary defect and laterally outward around the island pedicle utilizing iris scissors.  There was minimal undermining beneath the pedicle flap.

## 2025-04-19 NOTE — PRE-PROCEDURE NOTE
Report from Sending RN:    Report From: Zainab  Recent Surgery of Procedure: No  Baseline Level of Consciousness (LOC): Alert and Dania x4  Oxygen Use: No  Type: NA  Diabetic: No  Last BP Med Given Day of Dialysis: na  Last Pain Med Given: na  Lab Tests to be Obtained with Dialysis: No  Blood Transfusion to be Given During Dialysis: No  Available IV Access: Yes  Medications to be Administered During Dialysis: Yes Benadryl  Continuous IV Infusion Running: No  Restraints on Currently or in the Last 24 Hours: No  Hand-Off Communication: riki  Dialysis Catheter Dressing: Clean, dry and intact  Last Dressing Change: 4/19/2025

## 2025-04-20 VITALS
BODY MASS INDEX: 23.59 KG/M2 | WEIGHT: 155.65 LBS | OXYGEN SATURATION: 98 % | TEMPERATURE: 97.9 F | HEART RATE: 71 BPM | SYSTOLIC BLOOD PRESSURE: 178 MMHG | DIASTOLIC BLOOD PRESSURE: 94 MMHG | RESPIRATION RATE: 18 BRPM | HEIGHT: 68 IN

## 2025-04-20 LAB
ALBUMIN SERPL BCP-MCNC: 3.1 G/DL (ref 3.4–5)
ANION GAP SERPL CALCULATED.3IONS-SCNC: 16 MMOL/L (ref 10–20)
BACTERIA BLD CULT: NORMAL
BUN SERPL-MCNC: 22 MG/DL (ref 6–23)
CALCIUM SERPL-MCNC: 7 MG/DL (ref 8.6–10.3)
CHLORIDE SERPL-SCNC: 98 MMOL/L (ref 98–107)
CO2 SERPL-SCNC: 27 MMOL/L (ref 21–32)
CREAT SERPL-MCNC: 3.9 MG/DL (ref 0.5–1.05)
EGFRCR SERPLBLD CKD-EPI 2021: 13 ML/MIN/1.73M*2
ERYTHROCYTE [DISTWIDTH] IN BLOOD BY AUTOMATED COUNT: 16.8 % (ref 11.5–14.5)
GLUCOSE SERPL-MCNC: 113 MG/DL (ref 74–99)
HCT VFR BLD AUTO: 31 % (ref 36–46)
HGB BLD-MCNC: 9.5 G/DL (ref 12–16)
MAGNESIUM SERPL-MCNC: 1.72 MG/DL (ref 1.6–2.4)
MCH RBC QN AUTO: 30.4 PG (ref 26–34)
MCHC RBC AUTO-ENTMCNC: 30.6 G/DL (ref 32–36)
MCV RBC AUTO: 99 FL (ref 80–100)
NRBC BLD-RTO: 0 /100 WBCS (ref 0–0)
PHOSPHATE SERPL-MCNC: 4.5 MG/DL (ref 2.5–4.9)
PLATELET # BLD AUTO: 135 X10*3/UL (ref 150–450)
POTASSIUM SERPL-SCNC: 5 MMOL/L (ref 3.5–5.3)
RBC # BLD AUTO: 3.12 X10*6/UL (ref 4–5.2)
SODIUM SERPL-SCNC: 136 MMOL/L (ref 136–145)
WBC # BLD AUTO: 5 X10*3/UL (ref 4.4–11.3)

## 2025-04-20 PROCEDURE — 85027 COMPLETE CBC AUTOMATED: CPT | Performed by: NURSE PRACTITIONER

## 2025-04-20 PROCEDURE — 2060000001 HC INTERMEDIATE ICU ROOM DAILY

## 2025-04-20 PROCEDURE — 83735 ASSAY OF MAGNESIUM: CPT | Performed by: NURSE PRACTITIONER

## 2025-04-20 PROCEDURE — 36415 COLL VENOUS BLD VENIPUNCTURE: CPT | Performed by: NURSE PRACTITIONER

## 2025-04-20 PROCEDURE — 99231 SBSQ HOSP IP/OBS SF/LOW 25: CPT

## 2025-04-20 PROCEDURE — 2500000001 HC RX 250 WO HCPCS SELF ADMINISTERED DRUGS (ALT 637 FOR MEDICARE OP)

## 2025-04-20 PROCEDURE — 2500000004 HC RX 250 GENERAL PHARMACY W/ HCPCS (ALT 636 FOR OP/ED): Mod: JZ | Performed by: NURSE PRACTITIONER

## 2025-04-20 PROCEDURE — 80069 RENAL FUNCTION PANEL: CPT | Performed by: NURSE PRACTITIONER

## 2025-04-20 PROCEDURE — 2500000001 HC RX 250 WO HCPCS SELF ADMINISTERED DRUGS (ALT 637 FOR MEDICARE OP): Performed by: NURSE PRACTITIONER

## 2025-04-20 PROCEDURE — 99232 SBSQ HOSP IP/OBS MODERATE 35: CPT | Performed by: NURSE PRACTITIONER

## 2025-04-20 RX ORDER — HEPARIN SODIUM 1000 [USP'U]/ML
1000 INJECTION, SOLUTION INTRAVENOUS; SUBCUTANEOUS
Status: DISCONTINUED | OUTPATIENT
Start: 2025-04-21 | End: 2025-04-27 | Stop reason: HOSPADM

## 2025-04-20 RX ORDER — DIPHENHYDRAMINE HYDROCHLORIDE 50 MG/ML
50 INJECTION, SOLUTION INTRAMUSCULAR; INTRAVENOUS
Status: DISCONTINUED | OUTPATIENT
Start: 2025-04-20 | End: 2025-04-27 | Stop reason: HOSPADM

## 2025-04-20 RX ORDER — VANCOMYCIN 750 MG/150ML
750 INJECTION, SOLUTION INTRAVENOUS ONCE
Status: DISCONTINUED | OUTPATIENT
Start: 2025-04-21 | End: 2025-04-21

## 2025-04-20 RX ADMIN — METOPROLOL TARTRATE 25 MG: 25 TABLET, FILM COATED ORAL at 08:14

## 2025-04-20 RX ADMIN — ASPIRIN 81 MG: 81 TABLET, COATED ORAL at 08:14

## 2025-04-20 RX ADMIN — PIPERACILLIN SODIUM AND TAZOBACTAM SODIUM 2.25 G: 2; .25 INJECTION, SOLUTION INTRAVENOUS at 04:46

## 2025-04-20 RX ADMIN — CLONIDINE HYDROCHLORIDE 0.1 MG: 0.2 TABLET ORAL at 04:46

## 2025-04-20 RX ADMIN — CLONIDINE HYDROCHLORIDE 0.1 MG: 0.2 TABLET ORAL at 22:44

## 2025-04-20 RX ADMIN — PIPERACILLIN SODIUM AND TAZOBACTAM SODIUM 2.25 G: 2; .25 INJECTION, SOLUTION INTRAVENOUS at 22:44

## 2025-04-20 RX ADMIN — DIPHENHYDRAMINE HYDROCHLORIDE 50 MG: 50 INJECTION, SOLUTION INTRAMUSCULAR; INTRAVENOUS at 14:28

## 2025-04-20 RX ADMIN — MIRTAZAPINE 7.5 MG: 7.5 TABLET, FILM COATED ORAL at 20:32

## 2025-04-20 RX ADMIN — ATORVASTATIN CALCIUM 40 MG: 40 TABLET, FILM COATED ORAL at 08:14

## 2025-04-20 RX ADMIN — HYDROMORPHONE HYDROCHLORIDE 0.6 MG: 1 INJECTION, SOLUTION INTRAMUSCULAR; INTRAVENOUS; SUBCUTANEOUS at 11:17

## 2025-04-20 RX ADMIN — METOPROLOL TARTRATE 25 MG: 25 TABLET, FILM COATED ORAL at 20:31

## 2025-04-20 RX ADMIN — CLONIDINE HYDROCHLORIDE 0.1 MG: 0.2 TABLET ORAL at 14:05

## 2025-04-20 RX ADMIN — DIPHENHYDRAMINE HYDROCHLORIDE 25 MG: 50 INJECTION, SOLUTION INTRAMUSCULAR; INTRAVENOUS at 11:17

## 2025-04-20 RX ADMIN — PIPERACILLIN SODIUM AND TAZOBACTAM SODIUM 2.25 G: 2; .25 INJECTION, SOLUTION INTRAVENOUS at 14:05

## 2025-04-20 RX ADMIN — HYDROMORPHONE HYDROCHLORIDE 0.6 MG: 1 INJECTION, SOLUTION INTRAMUSCULAR; INTRAVENOUS; SUBCUTANEOUS at 17:29

## 2025-04-20 RX ADMIN — HYDROMORPHONE HYDROCHLORIDE 0.6 MG: 1 INJECTION, SOLUTION INTRAMUSCULAR; INTRAVENOUS; SUBCUTANEOUS at 20:31

## 2025-04-20 RX ADMIN — CALCITRIOL CAPSULES 0.25 MCG 0.5 MCG: 0.25 CAPSULE ORAL at 08:14

## 2025-04-20 RX ADMIN — DIPHENHYDRAMINE HYDROCHLORIDE 50 MG: 50 INJECTION, SOLUTION INTRAMUSCULAR; INTRAVENOUS at 20:31

## 2025-04-20 RX ADMIN — PREGABALIN 50 MG: 50 CAPSULE ORAL at 08:14

## 2025-04-20 RX ADMIN — HYDROMORPHONE HYDROCHLORIDE 0.6 MG: 1 INJECTION, SOLUTION INTRAMUSCULAR; INTRAVENOUS; SUBCUTANEOUS at 14:28

## 2025-04-20 RX ADMIN — DIPHENHYDRAMINE HYDROCHLORIDE 25 MG: 50 INJECTION, SOLUTION INTRAMUSCULAR; INTRAVENOUS at 01:37

## 2025-04-20 RX ADMIN — DIPHENHYDRAMINE HYDROCHLORIDE 50 MG: 50 INJECTION, SOLUTION INTRAMUSCULAR; INTRAVENOUS at 17:29

## 2025-04-20 RX ADMIN — HYDROMORPHONE HYDROCHLORIDE 0.6 MG: 1 INJECTION, SOLUTION INTRAMUSCULAR; INTRAVENOUS; SUBCUTANEOUS at 08:14

## 2025-04-20 RX ADMIN — HYDROMORPHONE HYDROCHLORIDE 0.6 MG: 1 INJECTION, SOLUTION INTRAMUSCULAR; INTRAVENOUS; SUBCUTANEOUS at 01:37

## 2025-04-20 RX ADMIN — DIPHENHYDRAMINE HYDROCHLORIDE 25 MG: 50 INJECTION, SOLUTION INTRAMUSCULAR; INTRAVENOUS at 08:14

## 2025-04-20 RX ADMIN — HYDROMORPHONE HYDROCHLORIDE 0.6 MG: 1 INJECTION, SOLUTION INTRAMUSCULAR; INTRAVENOUS; SUBCUTANEOUS at 04:45

## 2025-04-20 RX ADMIN — DIPHENHYDRAMINE HYDROCHLORIDE 25 MG: 50 INJECTION, SOLUTION INTRAMUSCULAR; INTRAVENOUS at 04:45

## 2025-04-20 RX ADMIN — DIPHENHYDRAMINE HYDROCHLORIDE 50 MG: 50 INJECTION, SOLUTION INTRAMUSCULAR; INTRAVENOUS at 23:29

## 2025-04-20 RX ADMIN — ERGOCALCIFEROL 1.25 MG: 1.25 CAPSULE ORAL at 08:14

## 2025-04-20 RX ADMIN — HYDROMORPHONE HYDROCHLORIDE 0.6 MG: 1 INJECTION, SOLUTION INTRAMUSCULAR; INTRAVENOUS; SUBCUTANEOUS at 23:29

## 2025-04-20 ASSESSMENT — PAIN DESCRIPTION - LOCATION
LOCATION: GENERALIZED

## 2025-04-20 ASSESSMENT — PAIN DESCRIPTION - DESCRIPTORS
DESCRIPTORS: DISCOMFORT
DESCRIPTORS: ACHING
DESCRIPTORS: ACHING;DISCOMFORT
DESCRIPTORS: DISCOMFORT
DESCRIPTORS: ACHING;DISCOMFORT
DESCRIPTORS: ACHING;DISCOMFORT
DESCRIPTORS: DISCOMFORT
DESCRIPTORS: ACHING
DESCRIPTORS: DISCOMFORT

## 2025-04-20 ASSESSMENT — PAIN - FUNCTIONAL ASSESSMENT
PAIN_FUNCTIONAL_ASSESSMENT: 0-10

## 2025-04-20 ASSESSMENT — PAIN SCALES - GENERAL
PAINLEVEL_OUTOF10: 10 - WORST POSSIBLE PAIN
PAINLEVEL_OUTOF10: 7
PAINLEVEL_OUTOF10: 10 - WORST POSSIBLE PAIN
PAINLEVEL_OUTOF10: 7
PAINLEVEL_OUTOF10: 8
PAINLEVEL_OUTOF10: 7
PAINLEVEL_OUTOF10: 10 - WORST POSSIBLE PAIN
PAINLEVEL_OUTOF10: 8
PAINLEVEL_OUTOF10: 7
PAINLEVEL_OUTOF10: 3
PAINLEVEL_OUTOF10: 10 - WORST POSSIBLE PAIN
PAINLEVEL_OUTOF10: 8
PAINLEVEL_OUTOF10: 4

## 2025-04-20 ASSESSMENT — COGNITIVE AND FUNCTIONAL STATUS - GENERAL
MOBILITY SCORE: 24
MOBILITY SCORE: 24
DAILY ACTIVITIY SCORE: 24
DAILY ACTIVITIY SCORE: 24

## 2025-04-20 NOTE — NURSING NOTE
Patient took pain meds. Had long chat with this nurse regarding dialysis and thoughts of maybe not wanting to continue on. Hoping HD line will be placed in chest but knows unlikely. States she feels like a burden to her family as they have to drive her and pick her up from HD. Sadness over continued blood infections from caths. States she knows she can stop HD anytime and die quickly with dignity but not sure if she's ready. States she has been doing HD over 20 years and other health ailments keep her hospitalized several times a year. Nursing offered emotional support and encouraged patient not to make any decisions while she is not feeling well. Patient agrees she is not suicidal and not at risk of harming herself. Plans to get catheter exchanged Monday as planned but is stating she's starting to think about stopping HD in the future. Call light within reach.

## 2025-04-20 NOTE — CARE PLAN
The patient's goals for the shift include      The clinical goals for the shift include adequate pain control    Over the shift, the patient did not make progress toward the following goals. Barriers to progression include pain . Recommendations to address these barriers include medicate for pain as needed .

## 2025-04-20 NOTE — ASSESSMENT & PLAN NOTE
Dialysis catheter site infection  Patient does have a history of bacteremia and endocarditis  Right groin dialysis catheter with erythema, edema, tenderness, drainage  Blood cultures negative to date  Continue IV antibiotics  Follow cultures  Right groin dialysis catheter in place however nonfunctioning and infected, will need removed  Temporary catheter has been placed in the left groin without signs or symptoms of infection currently.  This will need exchanged with permanent dialysis catheter prior to discharge.  ID recommending resuming vancomycin dwell after each dialysis session this  Pain management    ESRD on HD  Right groin dialysis catheter infected and nonfunctioning, will need removed prior to discharge  Left femoral temporary HD catheter placed 4/17  Potassium 6.1 this morning - STAT HD ordered  Continue home Retacrit, Calcitrol  IR consulted for tunneled line exchange  Nephrology consulted  Receives Benadryl prior to HD for itching, continue    HTN  Continue home metoprolol and clonidine  Monitor blood pressure close    Coronary artery disease  Continue aspirin and statin    Seizure  Seizure precautions  Continue Keppra     Anxiety/depression  Psych consulted, appreciate recs  Remeron and Atarax ordered    Anemia secondary to chronic renal disease  Continue home Retacrit  Transfuse for Hgb > 7        PLAN:  DVT prophylaxis: SCDs, patient had declined pharmacologic prophylaxis  Renal diet  Antibiotics  Follow cultures  IR consulted for tunneled line exchange  Will keep NPO after midnight for anticipated placement of tunneled HD cath tomorrow  CBC, PT, and BMP in the morning  Monitor on telemetry  Hemodialysis per nephrology    Please continue current pain regimen: Dilaudid 0.6mg Q 3 hours, Benadryl 50mg IV Q 3 hours. May be given together. She has been tolerating and this is the regimen that has worked for her on previous admissions.     Can consider pall/med consult if pain becomes unmanageable      CODE STATUS: FULL CODE

## 2025-04-20 NOTE — NURSING NOTE
Brie medicated with dilaudid and benadryl per request  Lengthy conversation expresses frustration with illness support given call light in reach

## 2025-04-20 NOTE — PROGRESS NOTES
BRIEF  ID  CHART  REVIEW  NOTE      Chart reviewed  Vital signs stable  Blood cultures remain negative  Awaiting removal of right femoral dialysis catheter and placement of new tunneled dialysis catheter    Will reassess 4/21, please call sooner prn    Adama Soto MD  [facility]  Office:  [phone]

## 2025-04-20 NOTE — PROGRESS NOTES
Batsheva Page is a 49 y.o. female on day 3 of admission with a PMHx of ESRD on dialysis for 21 years, recurrent MSSA/MRSA bacteremia, endocarditis s/p aortic and mitral valve replacement, HTN, HLD, seizure disorder, and hyponatremia presenting with concern of dialysis catheter infection. Patient noted her dialysis catheter has not been working intermittently and her sessions had been interrupted. Temporary HD cath has been placed, awaiting removal of tunneled catheter and replacement of permanent HD catheter.         Subjective   Patient seen and examined, resting in bed after dialysis. Denies fever, chills, nausea, vomiting, diarrhea, chest pain, and shortness of breath. Still having pain to old HD sight with erythema and swelling, complaining of increased itching with HD. Benadryl dose increased. Patient typically takes 50mg benadryl at home PRN       Objective     Last Recorded Vitals  /85 (BP Location: Right arm, Patient Position: Lying)   Pulse 62   Temp 36.5 °C (97.7 °F) (Oral)   Resp 16   Wt 70.6 kg (155 lb 10.3 oz)   SpO2 100%   Intake/Output last 3 Shifts:    Intake/Output Summary (Last 24 hours) at 4/20/2025 1315  Last data filed at 4/20/2025 0525  Gross per 24 hour   Intake 1490 ml   Output 0 ml   Net 1490 ml       Admission Weight  Weight: 67 kg (147 lb 11.3 oz) (04/17/25 1226)    Daily Weight  04/20/25 : 70.6 kg (155 lb 10.3 oz)    Image Results  ECG 12 lead  Normal sinus rhythm  Anterior infarct , age undetermined  Abnormal ECG  When compared with ECG of 14-DEC-2024 09:53,  Anterior infarct is now Present  T wave amplitude has increased in Lateral leads  Confirmed by Sade Solomon ([id number]) on 4/18/2025 1:03:27 PM      Physical Exam  Constitutional:       Appearance: She is not ill-appearing.   HENT:      Head: Normocephalic and atraumatic.      Mouth/Throat:      Mouth: Mucous membranes are moist.   Eyes:      Extraocular Movements: Extraocular movements intact.      Pupils: Pupils are  equal, round, and reactive to light.   Cardiovascular:      Rate and Rhythm: Normal rate and regular rhythm.   Pulmonary:      Effort: Pulmonary effort is normal.      Breath sounds: Normal breath sounds.   Abdominal:      General: Bowel sounds are normal.   Musculoskeletal:         General: Normal range of motion.      Cervical back: Normal range of motion.   Skin:     General: Skin is warm and dry.      Comments: Right groin dialysis catheter with edema, erythema, and tenderness. Left groin HD site without signs of infection   Neurological:      Mental Status: She is alert and oriented to person, place, and time.   Psychiatric:         Mood and Affect: Mood normal.         Behavior: Behavior normal.         Relevant Results  Lab Results   Component Value Date    GLUCOSE 113 (H) 04/20/2025    CALCIUM 7.0 (L) 04/20/2025     04/20/2025    K 5.0 04/20/2025    CO2 27 04/20/2025    CL 98 04/20/2025    BUN 22 04/20/2025    CREATININE 3.90 (H) 04/20/2025      Lab Results   Component Value Date    WBC 5.0 04/20/2025    HGB 9.5 (L) 04/20/2025    HCT 31.0 (L) 04/20/2025    MCV 99 04/20/2025     (L) 04/20/2025                This patient has a central line   Reason for the central line remaining today? Dialysis/Hemapheresis            Assessment & Plan  Hyperkalemia    Dialysis catheter site infection  Patient does have a history of bacteremia and endocarditis  Right groin dialysis catheter with erythema, edema, tenderness, drainage  Blood cultures negative to date  Continue IV antibiotics  Follow cultures  Right groin dialysis catheter in place however nonfunctioning and infected, will need removed  Temporary catheter has been placed in the left groin without signs or symptoms of infection currently.  This will need exchanged with permanent dialysis catheter prior to discharge.  ID recommending resuming vancomycin dwell after each dialysis session this  Pain management    ESRD on HD  Right groin dialysis  catheter infected and nonfunctioning, will need removed prior to discharge  Left femoral temporary HD catheter placed 4/17  Potassium 6.1 this morning - STAT HD ordered  Continue home Retacrit, Calcitrol  IR consulted for tunneled line exchange  Nephrology consulted  Receives Benadryl prior to HD for itching, continue    HTN  Continue home metoprolol and clonidine  Monitor blood pressure close    Coronary artery disease  Continue aspirin and statin    Seizure  Seizure precautions  Continue Keppra     Anxiety/depression  Psych consulted, appreciate recs  Remeron and Atarax ordered    Anemia secondary to chronic renal disease  Continue home Retacrit  Transfuse for Hgb > 7        PLAN:  DVT prophylaxis: SCDs, patient had declined pharmacologic prophylaxis  Renal diet  Antibiotics  Follow cultures  IR consulted for tunneled line exchange  Will keep NPO after midnight for anticipated placement of tunneled HD cath tomorrow  CBC, PT, and BMP in the morning  Monitor on telemetry  Hemodialysis per nephrology    Please continue current pain regimen: Dilaudid 0.6mg Q 3 hours, Benadryl 50mg IV Q 3 hours. May be given together. She has been tolerating and this is the regimen that has worked for her on previous admissions.     Can consider pall/med consult if pain becomes unmanageable     CODE STATUS: FULL CODE

## 2025-04-20 NOTE — LACTATION NOTE
Assumed care of patient. Patient pleasant and cooperative during BSSR. Asking for pain meds. Will medicate shortly. Call light within reach.

## 2025-04-20 NOTE — PROGRESS NOTES
"Batsheva Page is a 49 y.o. female on day 3 of admission presenting with Right foot infection.      Subjective   Patient's chart was reviewed and case was discussed with nursing. Nursing reported patient is calm, cooperative, compliant with her medications. Patient was seen in her room, watching TV. She was pleasant upon approach. Denies current suicidal/homicidal, auditory/visual hallucinations. Her reported mood is \"good.\"       Objective     Last Recorded Vitals  Blood pressure (!) 170/99, pulse 73, temperature 36.7 °C (98.1 °F), temperature source Oral, resp. rate 16, height 1.727 m (5' 8\"), weight 70.6 kg (155 lb 10.3 oz), SpO2 98%.    Sleep Log  No Safety Checks orders active in given range     Review of Systems    Psychiatric ROS - Adult  Depression: fatigue or loss of energy, markedly diminished interest or pleasure in all or most activities, and changes in appetite or weight leading to significant weight loss or gain unintentionally   Anxiety: restlessness or feeling keyed up or on edge and irritability  Suzanne: negative  Psychosis: negative  Delirium: negative   Trauma: negative     Physical Exam     Mental Status Exam  General: 49 years old female, lying in bed during interview.  Appearance: Appeared as age stated; appropriately dressed/groomed.  Attitude:  calm, cooperative  Behavior: Fair EC; overall responding appropriately  Motor Activity: No notable boby PMAR  Speech: Clear, with fair phonation, and no lisp nor dysarthria.   Mood: \"good\"  Affect: appropriate and mood congruent  Thought Process: Linear and logical; not perseverating   Thought Content: Denied SI/HI. Not voicing/endorsing delusions.  Thought Perception: Did not appear to be responding to internal stimuli. Not endorsing AVH  Cognition: Grossly intact; A&O x4/4 to self, place, date, and context.  Insight: Good as evidenced by patient's awareness and understanding of symptoms and benefit of treatment  Judgement: good, compliant with " her scheduled medications     Psychiatric Risk Assessment  Violence Risk Factors:  current psychiatric illness and stress/destabilizers  Acute Risk of Harm to Others is Considered: Low  Suicide Risk Factors: chronic medical illness, chronic pain, and current psychiatric illness  Protective Factors: social support/connectedness and hopefulness/future-orientation  Acute Risk of Harm to Self is Considered: Low    Relevant Results  Results for orders placed or performed during the hospital encounter of 04/17/25 (from the past 96 hours)   Blood Culture    Specimen: Peripheral Venipuncture; Blood culture   Result Value Ref Range    Blood Culture No growth at 2 days    Blood Culture    Specimen: Peripheral Venipuncture; Blood culture   Result Value Ref Range    Blood Culture No growth at 2 days    CBC and Auto Differential   Result Value Ref Range    WBC 5.8 4.4 - 11.3 x10*3/uL    nRBC 0.0 0.0 - 0.0 /100 WBCs    RBC 3.60 (L) 4.00 - 5.20 x10*6/uL    Hemoglobin 11.1 (L) 12.0 - 16.0 g/dL    Hematocrit 35.4 (L) 36.0 - 46.0 %    MCV 98 80 - 100 fL    MCH 30.8 26.0 - 34.0 pg    MCHC 31.4 (L) 32.0 - 36.0 g/dL    RDW 16.5 (H) 11.5 - 14.5 %    Platelets 166 150 - 450 x10*3/uL    Neutrophils % 64.6 40.0 - 80.0 %    Immature Granulocytes %, Automated 0.2 0.0 - 0.9 %    Lymphocytes % 18.7 13.0 - 44.0 %    Monocytes % 6.2 2.0 - 10.0 %    Eosinophils % 9.6 0.0 - 6.0 %    Basophils % 0.7 0.0 - 2.0 %    Neutrophils Absolute 3.77 1.20 - 7.70 x10*3/uL    Immature Granulocytes Absolute, Automated 0.01 0.00 - 0.70 x10*3/uL    Lymphocytes Absolute 1.09 (L) 1.20 - 4.80 x10*3/uL    Monocytes Absolute 0.36 0.10 - 1.00 x10*3/uL    Eosinophils Absolute 0.56 0.00 - 0.70 x10*3/uL    Basophils Absolute 0.04 0.00 - 0.10 x10*3/uL   Comprehensive metabolic panel   Result Value Ref Range    Glucose 82 74 - 99 mg/dL    Sodium 131 (L) 136 - 145 mmol/L    Potassium 8.2 (HH) 3.5 - 5.3 mmol/L    Chloride 90 (L) 98 - 107 mmol/L    Bicarbonate 30 21 - 32 mmol/L     Anion Gap 19 10 - 20 mmol/L    Urea Nitrogen 41 (H) 6 - 23 mg/dL    Creatinine 6.94 (H) 0.50 - 1.05 mg/dL    eGFR 7 (L) >60 mL/min/1.73m*2    Calcium 7.2 (L) 8.6 - 10.3 mg/dL    Albumin 3.8 3.4 - 5.0 g/dL    Alkaline Phosphatase 68 33 - 110 U/L    Total Protein 8.6 (H) 6.4 - 8.2 g/dL    AST 17 9 - 39 U/L    Bilirubin, Total 0.5 0.0 - 1.2 mg/dL    ALT 9 7 - 45 U/L   Lactate   Result Value Ref Range    Lactate 0.6 0.4 - 2.0 mmol/L   Basic metabolic panel   Result Value Ref Range    Glucose 85 74 - 99 mg/dL    Sodium 132 (L) 136 - 145 mmol/L    Potassium 7.6 (HH) 3.5 - 5.3 mmol/L    Chloride 92 (L) 98 - 107 mmol/L    Bicarbonate 29 21 - 32 mmol/L    Anion Gap 19 10 - 20 mmol/L    Urea Nitrogen 41 (H) 6 - 23 mg/dL    Creatinine 6.95 (H) 0.50 - 1.05 mg/dL    eGFR 7 (L) >60 mL/min/1.73m*2    Calcium 6.9 (L) 8.6 - 10.3 mg/dL   POCT GLUCOSE   Result Value Ref Range    POCT Glucose 82 74 - 99 mg/dL   ECG 12 lead   Result Value Ref Range    Ventricular Rate 73 BPM    Atrial Rate 73 BPM    MD Interval 202 ms    QRS Duration 74 ms    QT Interval 392 ms    QTC Calculation(Bazett) 431 ms    P Axis 55 degrees    R Axis -12 degrees    T Axis 55 degrees    QRS Count 13 beats    Q Onset 223 ms    P Onset 122 ms    P Offset 178 ms    T Offset 419 ms    QTC Fredericia 418 ms   Staphylococcus aureus/MRSA colonization, Culture    Specimen: Anterior Nares; Swab   Result Value Ref Range    Staph/MRSA Screen Culture (A)      Isolated: Methicillin Susceptible Staphylococcus aureus (MSSA)   CBC   Result Value Ref Range    WBC 5.4 4.4 - 11.3 x10*3/uL    nRBC 0.0 0.0 - 0.0 /100 WBCs    RBC 3.25 (L) 4.00 - 5.20 x10*6/uL    Hemoglobin 10.0 (L) 12.0 - 16.0 g/dL    Hematocrit 31.8 (L) 36.0 - 46.0 %    MCV 98 80 - 100 fL    MCH 30.8 26.0 - 34.0 pg    MCHC 31.4 (L) 32.0 - 36.0 g/dL    RDW 16.3 (H) 11.5 - 14.5 %    Platelets 129 (L) 150 - 450 x10*3/uL   Magnesium   Result Value Ref Range    Magnesium 1.62 1.60 - 2.40 mg/dL   Renal Function Panel    Result Value Ref Range    Glucose 117 (H) 74 - 99 mg/dL    Sodium 133 (L) 136 - 145 mmol/L    Potassium 4.2 3.5 - 5.3 mmol/L    Chloride 94 (L) 98 - 107 mmol/L    Bicarbonate 29 21 - 32 mmol/L    Anion Gap 14 10 - 20 mmol/L    Urea Nitrogen 9 6 - 23 mg/dL    Creatinine 2.18 (H) 0.50 - 1.05 mg/dL    eGFR 27 (L) >60 mL/min/1.73m*2    Calcium 7.8 (L) 8.6 - 10.3 mg/dL    Phosphorus 2.5 2.5 - 4.9 mg/dL    Albumin 3.3 (L) 3.4 - 5.0 g/dL   CBC   Result Value Ref Range    WBC 4.9 4.4 - 11.3 x10*3/uL    nRBC 0.0 0.0 - 0.0 /100 WBCs    RBC 3.17 (L) 4.00 - 5.20 x10*6/uL    Hemoglobin 9.7 (L) 12.0 - 16.0 g/dL    Hematocrit 31.5 (L) 36.0 - 46.0 %    MCV 99 80 - 100 fL    MCH 30.6 26.0 - 34.0 pg    MCHC 30.8 (L) 32.0 - 36.0 g/dL    RDW 16.7 (H) 11.5 - 14.5 %    Platelets 129 (L) 150 - 450 x10*3/uL   Renal Function Panel   Result Value Ref Range    Glucose 84 74 - 99 mg/dL    Sodium 135 (L) 136 - 145 mmol/L    Potassium 6.1 (HH) 3.5 - 5.3 mmol/L    Chloride 97 (L) 98 - 107 mmol/L    Bicarbonate 26 21 - 32 mmol/L    Anion Gap 18 10 - 20 mmol/L    Urea Nitrogen 26 (H) 6 - 23 mg/dL    Creatinine 4.72 (H) 0.50 - 1.05 mg/dL    eGFR 11 (L) >60 mL/min/1.73m*2    Calcium 6.8 (L) 8.6 - 10.3 mg/dL    Phosphorus 5.2 (H) 2.5 - 4.9 mg/dL    Albumin 3.1 (L) 3.4 - 5.0 g/dL   Magnesium   Result Value Ref Range    Magnesium 1.73 1.60 - 2.40 mg/dL   CBC   Result Value Ref Range    WBC 5.0 4.4 - 11.3 x10*3/uL    nRBC 0.0 0.0 - 0.0 /100 WBCs    RBC 3.12 (L) 4.00 - 5.20 x10*6/uL    Hemoglobin 9.5 (L) 12.0 - 16.0 g/dL    Hematocrit 31.0 (L) 36.0 - 46.0 %    MCV 99 80 - 100 fL    MCH 30.4 26.0 - 34.0 pg    MCHC 30.6 (L) 32.0 - 36.0 g/dL    RDW 16.8 (H) 11.5 - 14.5 %    Platelets 135 (L) 150 - 450 x10*3/uL   Renal Function Panel   Result Value Ref Range    Glucose 113 (H) 74 - 99 mg/dL    Sodium 136 136 - 145 mmol/L    Potassium 5.0 3.5 - 5.3 mmol/L    Chloride 98 98 - 107 mmol/L    Bicarbonate 27 21 - 32 mmol/L    Anion Gap 16 10 - 20 mmol/L    Urea  Nitrogen 22 6 - 23 mg/dL    Creatinine 3.90 (H) 0.50 - 1.05 mg/dL    eGFR 13 (L) >60 mL/min/1.73m*2    Calcium 7.0 (L) 8.6 - 10.3 mg/dL    Phosphorus 4.5 2.5 - 4.9 mg/dL    Albumin 3.1 (L) 3.4 - 5.0 g/dL   Magnesium   Result Value Ref Range    Magnesium 1.72 1.60 - 2.40 mg/dL   Pertinent labs were reviewed    Scheduled medications  Scheduled Medications[1]  Continuous medications  Continuous Medications[2]  PRN medications  PRN Medications[3]    Assessment & Plan  Right foot infection    Hyperkalemia    Depression  Anxiety     IMPRESSION  49-year-old female with a complex medical history including ESRD on hemodialysis, multiple cardiac valve replacements, recurrent bacteremia, and seizure disorder, presenting with concern for dialysis catheter infection. She reports worsening depressive and anxiety symptoms over the past several months, including poor sleep, appetite, low energy, and anhedonia. These symptoms appear to be compounded by her chronic medical conditions and ongoing physical discomfort. She denies suicidal or homicidal ideation, hallucinations, or past suicide attempts, and reports feeling safe at home. Patient was previously evaluated by psychiatric services in May 2024 and started on Remeron 15 mg at bedtime. She does not recall taking this medication or the reason for its discontinuation. Given her current symptoms and willingness to engage in treatment, a re-initiation of Remeron was discussed and agreed upon.   04/19/25 Denies current SI/HI, AH/VH. Reports sleeping well last night. No adverse reactions from Remeron reported.   04/2025 patient is calm, compliant with meds, pleasant upon approach today. Denies SI/HI, AH/VH.      PLAN/ RECOMMENDATIONS:      -Continue Remeron 7.5 mg po nightly to help with sleep, appetite, and mood/depression. Will titrate up based on response and tolerance   -Atarax 25 mg po every 6 hours as needed ordered for anxiety     - Patient does not currently meet criteria for  inpatient psychiatric admission.      Medication Consent  Medication Consent: risks, benefits, side effects reviewed for all ordered meds and patient/caregiver expressed understanding and consent obtained.     -Thank you for allowing us to participate in the care of this patient. Psychiatry will continue to follow.           I spent 26 minutes in the professional and overall care of this patient.      Sterling Asif, APRN-CNP           [1] aspirin, 81 mg, oral, Daily  atorvastatin, 40 mg, oral, Daily  calcitriol, 0.5 mcg, oral, Daily  cloNIDine, 0.1 mg, oral, q8h KIMBERLY  epoetin brandy-epbx, 100 Units/kg, subcutaneous, Once per day on Monday Wednesday Friday  ergocalciferol, 1.25 mg, oral, Every Sunday  [START ON 4/21/2025] heparin, 1,000 Units, intra-catheter, After Dialysis  levETIRAcetam, 750 mg, oral, Nightly  metoprolol tartrate, 25 mg, oral, BID  mirtazapine, 7.5 mg, oral, Nightly  piperacillin-tazobactam, 2.25 g, intravenous, q8h  pregabalin, 50 mg, oral, BID  [START ON 4/21/2025] vancomycin, 750 mg, intravenous, Once  [2]    [3] PRN medications: acetaminophen, diphenhydrAMINE, HYDROmorphone, hydrOXYzine HCL, [Held by provider] methocarbamol, oxyCODONE-acetaminophen, oxyCODONE-acetaminophen, promethazine, sodium chloride, vancomycin

## 2025-04-20 NOTE — PROGRESS NOTES
"Batsheva Page is a 49 y.o. female on day 3 of admission presenting with Right foot infection.      Subjective   Patient is being seen for ESRD, hyperkalemia and infected right femoral tunneled dialysis catheter on admission. Received urgent hemodialysis and was dialyzed yesterday with no issues, has  no complains today.     Objective     Physical Exam  Constitutional:       General: She is awake. She is not in acute distress.  Cardiovascular:      Rate and Rhythm: Regular rhythm.      Heart sounds:      No friction rub.   Pulmonary:      Effort: Pulmonary effort is normal.      Breath sounds: Normal breath sounds.   Abdominal:      General: Bowel sounds are normal.      Palpations: Abdomen is soft.      Tenderness: There is no guarding or rebound.   Musculoskeletal:      Comments: Trace edema   Neurological:      Mental Status: She is alert.        Intake/Output last 3 Shifts:    Intake/Output Summary (Last 24 hours) at 4/20/2025 1042  Last data filed at 4/20/2025 0525  Gross per 24 hour   Intake 1740 ml   Output 2656 ml   Net -916 ml     Visit Vitals  /85 (BP Location: Right arm, Patient Position: Lying)   Pulse 62   Temp 36.5 °C (97.7 °F) (Oral)   Resp 16   Ht 1.727 m (5' 8\")   Wt 70.6 kg (155 lb 10.3 oz)   SpO2 100%   BMI 23.67 kg/m²   OB Status Menopausal   Smoking Status Never   BSA 1.84 m²      Current Medications[1]     Results for orders placed or performed during the hospital encounter of 04/17/25 (from the past 24 hours)   CBC   Result Value Ref Range    WBC 5.0 4.4 - 11.3 x10*3/uL    nRBC 0.0 0.0 - 0.0 /100 WBCs    RBC 3.12 (L) 4.00 - 5.20 x10*6/uL    Hemoglobin 9.5 (L) 12.0 - 16.0 g/dL    Hematocrit 31.0 (L) 36.0 - 46.0 %    MCV 99 80 - 100 fL    MCH 30.4 26.0 - 34.0 pg    MCHC 30.6 (L) 32.0 - 36.0 g/dL    RDW 16.8 (H) 11.5 - 14.5 %    Platelets 135 (L) 150 - 450 x10*3/uL   Renal Function Panel   Result Value Ref Range    Glucose 113 (H) 74 - 99 mg/dL    Sodium 136 136 - 145 mmol/L    " Potassium 5.0 3.5 - 5.3 mmol/L    Chloride 98 98 - 107 mmol/L    Bicarbonate 27 21 - 32 mmol/L    Anion Gap 16 10 - 20 mmol/L    Urea Nitrogen 22 6 - 23 mg/dL    Creatinine 3.90 (H) 0.50 - 1.05 mg/dL    eGFR 13 (L) >60 mL/min/1.73m*2    Calcium 7.0 (L) 8.6 - 10.3 mg/dL    Phosphorus 4.5 2.5 - 4.9 mg/dL    Albumin 3.1 (L) 3.4 - 5.0 g/dL   Magnesium   Result Value Ref Range    Magnesium 1.72 1.60 - 2.40 mg/dL      Assessment & Plan    End-stage renal disease on hemodialysis-  received HD yesterday with  no issues.  Ensure renal diet. Awaiting IR exchange for tunneled dialysis catheter. Plans for HD tomorrow.   Hyperkalemia-improving, also on lokelma 10 grams every eight hours for 6 doses. Monitor levels closely, ensure renal diet.  Plans for HD tomorrow.   Infection of dialysis catheter site and malfunctioning dialysis catheter- IR consulted for tunneled dialysis catheter exchange, please exchange as soon as possible         ANGELA Cheatham           [1]   Current Facility-Administered Medications:     acetaminophen (Tylenol) tablet 650 mg, 650 mg, oral, q6h PRN, ORI Kincaid-CNP    aspirin EC tablet 81 mg, 81 mg, oral, Daily, ORI Kincaid-CNP, 81 mg at 04/20/25 0814    atorvastatin (Lipitor) tablet 40 mg, 40 mg, oral, Daily, ORI Kincaid-CNP, 40 mg at 04/20/25 0814    calcitriol (Rocaltrol) capsule 0.5 mcg, 0.5 mcg, oral, Daily, ORI Kincaid-CNP, 0.5 mcg at 04/20/25 0814    cloNIDine (Catapres) tablet 0.1 mg, 0.1 mg, oral, q8h KIMBERLY, ORI Kincaid-CNP, 0.1 mg at 04/20/25 0446    diphenhydrAMINE (BENADryl) injection 25 mg, 25 mg, intravenous, q3h PRN, ORI Kincaid-CNP, 25 mg at 04/20/25 0814    epoetin brandy-epbx (Retacrit) injection 6,440 Units, 100 Units/kg, subcutaneous, Once per day on Monday Wednesday Friday, ANGELA Kincaid, 6,440 Units at 04/18/25 1850    ergocalciferol (Vitamin D-2) capsule 1.25 mg, 1.25 mg, oral, Every Sunday, Noelle NAVA  ORI Doss-CNP, 1.25 mg at 04/20/25 0814    heparin 1,000 unit/mL injection 1,000 Units, 1,000 Units, intra-catheter, After Dialysis, Vu Pedersen MD    heparin 1,000 unit/mL injection 1,000 Units, 1,000 Units, intra-catheter, After Dialysis, ANGELA Kincaid    HYDROmorphone (Dilaudid) injection 0.6 mg, 0.6 mg, intravenous, q3h PRN, ORI Kincaid-CNP, 0.6 mg at 04/20/25 0814    hydrOXYzine HCL (Atarax) tablet 25 mg, 25 mg, oral, q6h PRN, ANGELA Michael    levETIRAcetam (Keppra) tablet 750 mg, 750 mg, oral, Nightly, ORI Kincaid-CNP, 750 mg at 04/18/25 2125    [Held by provider] methocarbamol (Robaxin) tablet 750 mg, 750 mg, oral, q6h PRN, Gloria Carrion PA-C    metoprolol tartrate (Lopressor) tablet 25 mg, 25 mg, oral, BID, ANGELA Kincaid, 25 mg at 04/20/25 0814    mirtazapine (Remeron) tablet 7.5 mg, 7.5 mg, oral, Nightly, ANGELA Michael, 7.5 mg at 04/19/25 2110    oxyCODONE-acetaminophen (Percocet)  mg per tablet 1 tablet, 1 tablet, oral, q6h PRN, ORI Kincaid-CNP, 1 tablet at 04/18/25 0632    oxyCODONE-acetaminophen (Percocet) 5-325 mg per tablet 1 tablet, 1 tablet, oral, q6h PRN, ORI Kincaid-CNP    piperacillin-tazobactam (Zosyn) 2.25 g in dextrose (iso) IV 50 mL, 2.25 g, intravenous, q8h, ANGELA Kincaid, Stopped at 04/20/25 0525    pregabalin (Lyrica) capsule 50 mg, 50 mg, oral, BID, ORI Kincaid-CNP, 50 mg at 04/20/25 0814    promethazine (Phenergan) tablet 25 mg, 25 mg, oral, Daily PRN, ANGELA Kincaid, 25 mg at 04/18/25 0514    sodium chloride 0.9 % bolus 200 mL, 200 mL, intravenous, q1h PRN, Vu Pedersen MD    vancomycin (Vancocin) pharmacy to dose - pharmacy monitoring, , miscellaneous, Daily PRN, ORI Kincaid-CNP

## 2025-04-20 NOTE — NURSING NOTE
"Patient medicated for pain per request. Very pleasant. Labs sent. Patient again reports feeling like a burden in asking for pain meds. States she understands she gets high doses frequently and feels some doctors label her a \"drug seeker\". Patient reports she just wants to feel comfortable and requires high doses. States she understands she cannot go home on IV meds but while in hospital and sick, she would like to continue what works and this regimen is working. Nursing listened and offered emotional support. Call light within reach.   "

## 2025-04-21 ENCOUNTER — APPOINTMENT (OUTPATIENT)
Dept: DIALYSIS | Facility: HOSPITAL | Age: 50
End: 2025-04-21
Payer: MEDICARE

## 2025-04-21 LAB
ALBUMIN SERPL BCP-MCNC: 3.3 G/DL (ref 3.4–5)
ANION GAP SERPL CALCULATED.3IONS-SCNC: 20 MMOL/L (ref 10–20)
BACTERIA BLD CULT: NORMAL
BACTERIA BLD CULT: NORMAL
BUN SERPL-MCNC: 31 MG/DL (ref 6–23)
CALCIUM SERPL-MCNC: 7 MG/DL (ref 8.6–10.3)
CHLORIDE SERPL-SCNC: 97 MMOL/L (ref 98–107)
CO2 SERPL-SCNC: 24 MMOL/L (ref 21–32)
CREAT SERPL-MCNC: 5.58 MG/DL (ref 0.5–1.05)
EGFRCR SERPLBLD CKD-EPI 2021: 9 ML/MIN/1.73M*2
ERYTHROCYTE [DISTWIDTH] IN BLOOD BY AUTOMATED COUNT: 16.7 % (ref 11.5–14.5)
GLUCOSE SERPL-MCNC: 97 MG/DL (ref 74–99)
HCT VFR BLD AUTO: 32.4 % (ref 36–46)
HGB BLD-MCNC: 10.1 G/DL (ref 12–16)
INR PPP: 1.1 (ref 0.9–1.2)
MAGNESIUM SERPL-MCNC: 1.74 MG/DL (ref 1.6–2.4)
MCH RBC QN AUTO: 30.6 PG (ref 26–34)
MCHC RBC AUTO-ENTMCNC: 31.2 G/DL (ref 32–36)
MCV RBC AUTO: 98 FL (ref 80–100)
NRBC BLD-RTO: 0 /100 WBCS (ref 0–0)
PHOSPHATE SERPL-MCNC: 5.5 MG/DL (ref 2.5–4.9)
PLATELET # BLD AUTO: 145 X10*3/UL (ref 150–450)
POTASSIUM SERPL-SCNC: 5.3 MMOL/L (ref 3.5–5.3)
PROTHROMBIN TIME: 11.7 SECONDS (ref 9.3–12.7)
RBC # BLD AUTO: 3.3 X10*6/UL (ref 4–5.2)
SODIUM SERPL-SCNC: 136 MMOL/L (ref 136–145)
VANCOMYCIN SERPL-MCNC: 42.6 UG/ML (ref 5–20)
WBC # BLD AUTO: 8.1 X10*3/UL (ref 4.4–11.3)

## 2025-04-21 PROCEDURE — 99231 SBSQ HOSP IP/OBS SF/LOW 25: CPT

## 2025-04-21 PROCEDURE — 99232 SBSQ HOSP IP/OBS MODERATE 35: CPT | Performed by: NURSE PRACTITIONER

## 2025-04-21 PROCEDURE — 2500000004 HC RX 250 GENERAL PHARMACY W/ HCPCS (ALT 636 FOR OP/ED): Performed by: NURSE PRACTITIONER

## 2025-04-21 PROCEDURE — 85610 PROTHROMBIN TIME: CPT | Performed by: NURSE PRACTITIONER

## 2025-04-21 PROCEDURE — 2500000002 HC RX 250 W HCPCS SELF ADMINISTERED DRUGS (ALT 637 FOR MEDICARE OP, ALT 636 FOR OP/ED): Performed by: NURSE PRACTITIONER

## 2025-04-21 PROCEDURE — 2500000004 HC RX 250 GENERAL PHARMACY W/ HCPCS (ALT 636 FOR OP/ED): Mod: JZ | Performed by: NURSE PRACTITIONER

## 2025-04-21 PROCEDURE — 85027 COMPLETE CBC AUTOMATED: CPT | Performed by: NURSE PRACTITIONER

## 2025-04-21 PROCEDURE — 6350000001 HC RX 635 EPOETIN >10,000 UNITS: Performed by: NURSE PRACTITIONER

## 2025-04-21 PROCEDURE — 80069 RENAL FUNCTION PANEL: CPT | Performed by: NURSE PRACTITIONER

## 2025-04-21 PROCEDURE — 2500000001 HC RX 250 WO HCPCS SELF ADMINISTERED DRUGS (ALT 637 FOR MEDICARE OP)

## 2025-04-21 PROCEDURE — 8010000001 HC DIALYSIS - HEMODIALYSIS PER DAY

## 2025-04-21 PROCEDURE — 2500000001 HC RX 250 WO HCPCS SELF ADMINISTERED DRUGS (ALT 637 FOR MEDICARE OP): Performed by: NURSE PRACTITIONER

## 2025-04-21 PROCEDURE — 2060000001 HC INTERMEDIATE ICU ROOM DAILY

## 2025-04-21 PROCEDURE — 2500000005 HC RX 250 GENERAL PHARMACY W/O HCPCS: Performed by: NURSE PRACTITIONER

## 2025-04-21 PROCEDURE — 36415 COLL VENOUS BLD VENIPUNCTURE: CPT | Performed by: NURSE PRACTITIONER

## 2025-04-21 PROCEDURE — 2500000004 HC RX 250 GENERAL PHARMACY W/ HCPCS (ALT 636 FOR OP/ED)

## 2025-04-21 PROCEDURE — 80202 ASSAY OF VANCOMYCIN: CPT | Performed by: INTERNAL MEDICINE

## 2025-04-21 PROCEDURE — 83735 ASSAY OF MAGNESIUM: CPT | Performed by: NURSE PRACTITIONER

## 2025-04-21 RX ORDER — MUPIROCIN 20 MG/G
OINTMENT TOPICAL 3 TIMES DAILY
Status: DISCONTINUED | OUTPATIENT
Start: 2025-04-21 | End: 2025-04-27 | Stop reason: HOSPADM

## 2025-04-21 RX ORDER — MIRTAZAPINE 15 MG/1
15 TABLET, FILM COATED ORAL NIGHTLY
Status: DISCONTINUED | OUTPATIENT
Start: 2025-04-21 | End: 2025-04-27 | Stop reason: HOSPADM

## 2025-04-21 RX ORDER — HEPARIN SODIUM 1000 [USP'U]/ML
1000 INJECTION, SOLUTION INTRAVENOUS; SUBCUTANEOUS
Status: DISCONTINUED | OUTPATIENT
Start: 2025-04-22 | End: 2025-04-27 | Stop reason: HOSPADM

## 2025-04-21 RX ORDER — PROMETHAZINE HYDROCHLORIDE 25 MG/1
25 TABLET ORAL 2 TIMES DAILY PRN
Status: DISCONTINUED | OUTPATIENT
Start: 2025-04-21 | End: 2025-04-21

## 2025-04-21 RX ORDER — PROMETHAZINE HYDROCHLORIDE 25 MG/1
12.5 TABLET ORAL EVERY 6 HOURS PRN
Status: DISCONTINUED | OUTPATIENT
Start: 2025-04-21 | End: 2025-04-27 | Stop reason: HOSPADM

## 2025-04-21 RX ADMIN — HYDROMORPHONE HYDROCHLORIDE 0.6 MG: 1 INJECTION, SOLUTION INTRAMUSCULAR; INTRAVENOUS; SUBCUTANEOUS at 18:34

## 2025-04-21 RX ADMIN — PIPERACILLIN SODIUM AND TAZOBACTAM SODIUM 2.25 G: 2; .25 INJECTION, SOLUTION INTRAVENOUS at 13:31

## 2025-04-21 RX ADMIN — HYDROMORPHONE HYDROCHLORIDE 0.6 MG: 1 INJECTION, SOLUTION INTRAMUSCULAR; INTRAVENOUS; SUBCUTANEOUS at 15:15

## 2025-04-21 RX ADMIN — MUPIROCIN: 20 OINTMENT TOPICAL at 18:01

## 2025-04-21 RX ADMIN — METOPROLOL TARTRATE 25 MG: 25 TABLET, FILM COATED ORAL at 21:40

## 2025-04-21 RX ADMIN — HYDROMORPHONE HYDROCHLORIDE 0.6 MG: 1 INJECTION, SOLUTION INTRAMUSCULAR; INTRAVENOUS; SUBCUTANEOUS at 21:40

## 2025-04-21 RX ADMIN — DIPHENHYDRAMINE HYDROCHLORIDE 50 MG: 50 INJECTION, SOLUTION INTRAMUSCULAR; INTRAVENOUS at 02:33

## 2025-04-21 RX ADMIN — PROMETHAZINE HYDROCHLORIDE 12.5 MG: 25 TABLET ORAL at 20:51

## 2025-04-21 RX ADMIN — HEPARIN SODIUM 1200 UNITS: 1000 INJECTION INTRAVENOUS; SUBCUTANEOUS at 17:42

## 2025-04-21 RX ADMIN — DIPHENHYDRAMINE HYDROCHLORIDE 50 MG: 50 INJECTION, SOLUTION INTRAMUSCULAR; INTRAVENOUS at 21:40

## 2025-04-21 RX ADMIN — DIPHENHYDRAMINE HYDROCHLORIDE 50 MG: 50 INJECTION, SOLUTION INTRAMUSCULAR; INTRAVENOUS at 18:35

## 2025-04-21 RX ADMIN — CLONIDINE HYDROCHLORIDE 0.1 MG: 0.2 TABLET ORAL at 05:31

## 2025-04-21 RX ADMIN — EPOETIN ALFA-EPBX 6440 UNITS: 20000 INJECTION, SOLUTION INTRAVENOUS; SUBCUTANEOUS at 18:42

## 2025-04-21 RX ADMIN — PIPERACILLIN SODIUM AND TAZOBACTAM SODIUM 2.25 G: 2; .25 INJECTION, SOLUTION INTRAVENOUS at 22:23

## 2025-04-21 RX ADMIN — HYDROXYZINE HYDROCHLORIDE 25 MG: 25 TABLET, FILM COATED ORAL at 13:32

## 2025-04-21 RX ADMIN — DIPHENHYDRAMINE HYDROCHLORIDE 50 MG: 50 INJECTION, SOLUTION INTRAMUSCULAR; INTRAVENOUS at 15:15

## 2025-04-21 RX ADMIN — HYDROMORPHONE HYDROCHLORIDE 0.6 MG: 1 INJECTION, SOLUTION INTRAMUSCULAR; INTRAVENOUS; SUBCUTANEOUS at 08:49

## 2025-04-21 RX ADMIN — MUPIROCIN: 20 OINTMENT TOPICAL at 11:00

## 2025-04-21 RX ADMIN — HYDROMORPHONE HYDROCHLORIDE 0.6 MG: 1 INJECTION, SOLUTION INTRAMUSCULAR; INTRAVENOUS; SUBCUTANEOUS at 02:33

## 2025-04-21 RX ADMIN — CLONIDINE HYDROCHLORIDE 0.1 MG: 0.2 TABLET ORAL at 13:13

## 2025-04-21 RX ADMIN — DIPHENHYDRAMINE HYDROCHLORIDE 50 MG: 50 INJECTION, SOLUTION INTRAMUSCULAR; INTRAVENOUS at 05:29

## 2025-04-21 RX ADMIN — DIPHENHYDRAMINE HYDROCHLORIDE 50 MG: 50 INJECTION, SOLUTION INTRAMUSCULAR; INTRAVENOUS at 11:45

## 2025-04-21 RX ADMIN — DIPHENHYDRAMINE HYDROCHLORIDE 50 MG: 50 INJECTION, SOLUTION INTRAMUSCULAR; INTRAVENOUS at 08:49

## 2025-04-21 RX ADMIN — PROMETHAZINE HYDROCHLORIDE 12.5 MG: 25 TABLET ORAL at 13:26

## 2025-04-21 RX ADMIN — CLONIDINE HYDROCHLORIDE 0.1 MG: 0.2 TABLET ORAL at 21:40

## 2025-04-21 RX ADMIN — MIRTAZAPINE 15 MG: 15 TABLET, FILM COATED ORAL at 21:40

## 2025-04-21 RX ADMIN — PROMETHAZINE HYDROCHLORIDE 25 MG: 25 TABLET ORAL at 07:05

## 2025-04-21 RX ADMIN — HYDROMORPHONE HYDROCHLORIDE 0.6 MG: 1 INJECTION, SOLUTION INTRAMUSCULAR; INTRAVENOUS; SUBCUTANEOUS at 05:29

## 2025-04-21 RX ADMIN — HYDROMORPHONE HYDROCHLORIDE 0.6 MG: 1 INJECTION, SOLUTION INTRAMUSCULAR; INTRAVENOUS; SUBCUTANEOUS at 11:45

## 2025-04-21 RX ADMIN — PIPERACILLIN SODIUM AND TAZOBACTAM SODIUM 2.25 G: 2; .25 INJECTION, SOLUTION INTRAVENOUS at 05:31

## 2025-04-21 RX ADMIN — HYDROXYZINE HYDROCHLORIDE 25 MG: 25 TABLET, FILM COATED ORAL at 19:38

## 2025-04-21 ASSESSMENT — COGNITIVE AND FUNCTIONAL STATUS - GENERAL
MOBILITY SCORE: 24
DAILY ACTIVITIY SCORE: 24

## 2025-04-21 ASSESSMENT — PAIN SCALES - GENERAL
PAINLEVEL_OUTOF10: 10 - WORST POSSIBLE PAIN
PAINLEVEL_OUTOF10: 7
PAINLEVEL_OUTOF10: 10 - WORST POSSIBLE PAIN
PAINLEVEL_OUTOF10: 7
PAINLEVEL_OUTOF10: 7
PAINLEVEL_OUTOF10: 4
PAINLEVEL_OUTOF10: 4
PAINLEVEL_OUTOF10: 7
PAINLEVEL_OUTOF10: 10 - WORST POSSIBLE PAIN
PAINLEVEL_OUTOF10: 4
PAINLEVEL_OUTOF10: 10 - WORST POSSIBLE PAIN
PAINLEVEL_OUTOF10: 7

## 2025-04-21 ASSESSMENT — PAIN DESCRIPTION - LOCATION
LOCATION: GENERALIZED
LOCATION: GENERALIZED

## 2025-04-21 ASSESSMENT — PAIN DESCRIPTION - DESCRIPTORS
DESCRIPTORS: ACHING;DISCOMFORT
DESCRIPTORS: BURNING
DESCRIPTORS: ACHING;DISCOMFORT
DESCRIPTORS: BURNING
DESCRIPTORS: ACHING;DISCOMFORT
DESCRIPTORS: ACHING;DISCOMFORT
DESCRIPTORS: BURNING
DESCRIPTORS: ACHING;DISCOMFORT

## 2025-04-21 NOTE — NURSING NOTE
"Melina from IR calls, states they can take the patient now to place dialysis catheter if patient is able to do procedure without anesthesia, nurse spoke with patient, she stated, \"absolutely not.\"  \"That doesn't work for me.\"  The next available date would not be until the 24th, pt was also aware of this and still declined  "

## 2025-04-21 NOTE — TREATMENT PLAN
IR Progress Note    49-year-old female with a complex medical history including ESRD on hemodialysis, multiple cardiac valve replacements, recurrent bacteremia, and seizure disorder who presented with concern for dialysis catheter infection. Area around the right femoral tunneled catheter has chronic skin changes without evidence of tunnelitis. Currently the right TDC does not work and had a nontunneled catheter placed in the left groin while in the ICU. Blood cultures have so far been negative. Tunneled catheter exchange has been attempted to be completed on Friday and today, however patient refuses to complete procedure without anesthesia support. Due to resource availability the soonest cath lab and anesthesia are available for support of the case is 4/24 at 3PM.

## 2025-04-21 NOTE — PROGRESS NOTES
Vancomycin Dosing by Pharmacy- FOLLOW-UP (HEMODIALYSIS)    Batsheva Page is a 49 y.o. year old female who Pharmacy has been consulted for vancomycin dosing for cellulitis/skin and soft tissue infection. Based on the patient's indication and renal status this patient will be dosed based on a pre-HD level of 20-25 mcg/mL.     Patient is currently on hemodialysis User Schedule (TBD).    Current vancomycin regimen or maintenance dose:  750 mg after each dialysis session     Vancomycin pre-HD level 42.6 mcg/mL    Lab Results   Component Value Date    VANCORANDOM 42.6 (H) 04/21/2025    VANCOTROUGH 27.9 (HH) 09/26/2022       Visit Vitals  BP (!) 147/96 (BP Location: Right arm, Patient Position: Lying)   Pulse 73   Temp 36.4 °C (97.5 °F) (Oral)   Resp 19        Lab Results   Component Value Date    CREATININE 5.58 (H) 04/21/2025    CREATININE 3.90 (H) 04/20/2025    CREATININE 4.72 (H) 04/19/2025    CREATININE 2.18 (H) 04/18/2025       I/O last 3 completed shifts:  In: 970 (13.6 mL/kg) [P.O.:720; IV Piggyback:250]  Out: 0 (0 mL/kg)   Weight: 71.4 kg     Assessment/Plan     Level is above target trough goal  Hold Vancomycin today after dialysis  Next pre-HD level will be obtained on 4/22 at 0500. May be obtained sooner if clinically indicated.    Will continue to monitor renal function daily while on vancomycin and order serum creatinine at least every 48 hours if not already ordered.  Follow for continued vancomycin needs, clinical response, and signs/symptoms of toxicity.     Hiro Wallis RP

## 2025-04-21 NOTE — POST-PROCEDURE NOTE
Report to Receiving RN:    Report To: Florida Simpson RN  Time Report Called: [id number]  Hand-Off Communication: 1.1L removed with HD; Post HD BP is 167/85 p70.  Complications During Treatment: No  Ultrafiltration Treatment: No  Medications Administered During Dialysis: Yes  Blood Products Administered During Dialysis: No  Labs Sent During Dialysis: No  Heparin Drip Rate Changes: N/A  Dialysis Catheter Dressing: D&I  Last Dressing Change: 4/21/25    Electronic Signatures:   (Signed Jose Fields)     Last Updated: 5:58 PM by JOSE FIELDS

## 2025-04-21 NOTE — PROGRESS NOTES
"Batsheva Page is a 49 y.o. female on day 4 of admission presenting with Right foot infection.    Subjective   Patient is seen for end-stage kidney disease and hyperkalemia admitted with malfunctioning right femoral dialysis catheter to replace the cath by IR the patient was dialyzed through a left temporary dialysis catheter in the groin she feels well she has no complaints       Objective     Physical Exam  Constitutional:       General: She is not in acute distress.     Appearance: Normal appearance. She is not toxic-appearing.   Neck:      Vascular: No carotid bruit.   Cardiovascular:      Heart sounds: No murmur heard.     No friction rub. No gallop.   Pulmonary:      Breath sounds: No wheezing, rhonchi or rales.   Abdominal:      Tenderness: There is no abdominal tenderness. There is no right CVA tenderness, left CVA tenderness or rebound.   Musculoskeletal:         General: No tenderness.      Cervical back: Neck supple.      Right lower leg: No edema.      Left lower leg: No edema.   Lymphadenopathy:      Cervical: No cervical adenopathy.         Last Recorded Vitals  Blood pressure (!) 147/96, pulse 73, temperature 36.4 °C (97.5 °F), temperature source Oral, resp. rate 19, height 1.727 m (5' 8\"), weight 71.4 kg (157 lb 6.5 oz), SpO2 94%.    Intake/Output last 3 Shifts:  I/O last 3 completed shifts:  In: 970 (13.6 mL/kg) [P.O.:720; IV Piggyback:250]  Out: 0 (0 mL/kg)   Weight: 71.4 kg   Current Medications[1]   Relevant Results    Results for orders placed or performed during the hospital encounter of 04/17/25 (from the past 96 hours)   Blood Culture    Specimen: Peripheral Venipuncture; Blood culture   Result Value Ref Range    Blood Culture No growth at 3 days    Blood Culture    Specimen: Peripheral Venipuncture; Blood culture   Result Value Ref Range    Blood Culture No growth at 3 days    CBC and Auto Differential   Result Value Ref Range    WBC 5.8 4.4 - 11.3 x10*3/uL    nRBC 0.0 0.0 - 0.0 /100 " WBCs    RBC 3.60 (L) 4.00 - 5.20 x10*6/uL    Hemoglobin 11.1 (L) 12.0 - 16.0 g/dL    Hematocrit 35.4 (L) 36.0 - 46.0 %    MCV 98 80 - 100 fL    MCH 30.8 26.0 - 34.0 pg    MCHC 31.4 (L) 32.0 - 36.0 g/dL    RDW 16.5 (H) 11.5 - 14.5 %    Platelets 166 150 - 450 x10*3/uL    Neutrophils % 64.6 40.0 - 80.0 %    Immature Granulocytes %, Automated 0.2 0.0 - 0.9 %    Lymphocytes % 18.7 13.0 - 44.0 %    Monocytes % 6.2 2.0 - 10.0 %    Eosinophils % 9.6 0.0 - 6.0 %    Basophils % 0.7 0.0 - 2.0 %    Neutrophils Absolute 3.77 1.20 - 7.70 x10*3/uL    Immature Granulocytes Absolute, Automated 0.01 0.00 - 0.70 x10*3/uL    Lymphocytes Absolute 1.09 (L) 1.20 - 4.80 x10*3/uL    Monocytes Absolute 0.36 0.10 - 1.00 x10*3/uL    Eosinophils Absolute 0.56 0.00 - 0.70 x10*3/uL    Basophils Absolute 0.04 0.00 - 0.10 x10*3/uL   Comprehensive metabolic panel   Result Value Ref Range    Glucose 82 74 - 99 mg/dL    Sodium 131 (L) 136 - 145 mmol/L    Potassium 8.2 (HH) 3.5 - 5.3 mmol/L    Chloride 90 (L) 98 - 107 mmol/L    Bicarbonate 30 21 - 32 mmol/L    Anion Gap 19 10 - 20 mmol/L    Urea Nitrogen 41 (H) 6 - 23 mg/dL    Creatinine 6.94 (H) 0.50 - 1.05 mg/dL    eGFR 7 (L) >60 mL/min/1.73m*2    Calcium 7.2 (L) 8.6 - 10.3 mg/dL    Albumin 3.8 3.4 - 5.0 g/dL    Alkaline Phosphatase 68 33 - 110 U/L    Total Protein 8.6 (H) 6.4 - 8.2 g/dL    AST 17 9 - 39 U/L    Bilirubin, Total 0.5 0.0 - 1.2 mg/dL    ALT 9 7 - 45 U/L   Lactate   Result Value Ref Range    Lactate 0.6 0.4 - 2.0 mmol/L   Basic metabolic panel   Result Value Ref Range    Glucose 85 74 - 99 mg/dL    Sodium 132 (L) 136 - 145 mmol/L    Potassium 7.6 (HH) 3.5 - 5.3 mmol/L    Chloride 92 (L) 98 - 107 mmol/L    Bicarbonate 29 21 - 32 mmol/L    Anion Gap 19 10 - 20 mmol/L    Urea Nitrogen 41 (H) 6 - 23 mg/dL    Creatinine 6.95 (H) 0.50 - 1.05 mg/dL    eGFR 7 (L) >60 mL/min/1.73m*2    Calcium 6.9 (L) 8.6 - 10.3 mg/dL   POCT GLUCOSE   Result Value Ref Range    POCT Glucose 82 74 - 99 mg/dL   ECG  12 lead   Result Value Ref Range    Ventricular Rate 73 BPM    Atrial Rate 73 BPM    ND Interval 202 ms    QRS Duration 74 ms    QT Interval 392 ms    QTC Calculation(Bazett) 431 ms    P Axis 55 degrees    R Axis -12 degrees    T Axis 55 degrees    QRS Count 13 beats    Q Onset 223 ms    P Onset 122 ms    P Offset 178 ms    T Offset 419 ms    QTC Fredericia 418 ms   Staphylococcus aureus/MRSA colonization, Culture    Specimen: Anterior Nares; Swab   Result Value Ref Range    Staph/MRSA Screen Culture (A)      Isolated: Methicillin Susceptible Staphylococcus aureus (MSSA)   CBC   Result Value Ref Range    WBC 5.4 4.4 - 11.3 x10*3/uL    nRBC 0.0 0.0 - 0.0 /100 WBCs    RBC 3.25 (L) 4.00 - 5.20 x10*6/uL    Hemoglobin 10.0 (L) 12.0 - 16.0 g/dL    Hematocrit 31.8 (L) 36.0 - 46.0 %    MCV 98 80 - 100 fL    MCH 30.8 26.0 - 34.0 pg    MCHC 31.4 (L) 32.0 - 36.0 g/dL    RDW 16.3 (H) 11.5 - 14.5 %    Platelets 129 (L) 150 - 450 x10*3/uL   Magnesium   Result Value Ref Range    Magnesium 1.62 1.60 - 2.40 mg/dL   Renal Function Panel   Result Value Ref Range    Glucose 117 (H) 74 - 99 mg/dL    Sodium 133 (L) 136 - 145 mmol/L    Potassium 4.2 3.5 - 5.3 mmol/L    Chloride 94 (L) 98 - 107 mmol/L    Bicarbonate 29 21 - 32 mmol/L    Anion Gap 14 10 - 20 mmol/L    Urea Nitrogen 9 6 - 23 mg/dL    Creatinine 2.18 (H) 0.50 - 1.05 mg/dL    eGFR 27 (L) >60 mL/min/1.73m*2    Calcium 7.8 (L) 8.6 - 10.3 mg/dL    Phosphorus 2.5 2.5 - 4.9 mg/dL    Albumin 3.3 (L) 3.4 - 5.0 g/dL   CBC   Result Value Ref Range    WBC 4.9 4.4 - 11.3 x10*3/uL    nRBC 0.0 0.0 - 0.0 /100 WBCs    RBC 3.17 (L) 4.00 - 5.20 x10*6/uL    Hemoglobin 9.7 (L) 12.0 - 16.0 g/dL    Hematocrit 31.5 (L) 36.0 - 46.0 %    MCV 99 80 - 100 fL    MCH 30.6 26.0 - 34.0 pg    MCHC 30.8 (L) 32.0 - 36.0 g/dL    RDW 16.7 (H) 11.5 - 14.5 %    Platelets 129 (L) 150 - 450 x10*3/uL   Renal Function Panel   Result Value Ref Range    Glucose 84 74 - 99 mg/dL    Sodium 135 (L) 136 - 145 mmol/L     Potassium 6.1 (HH) 3.5 - 5.3 mmol/L    Chloride 97 (L) 98 - 107 mmol/L    Bicarbonate 26 21 - 32 mmol/L    Anion Gap 18 10 - 20 mmol/L    Urea Nitrogen 26 (H) 6 - 23 mg/dL    Creatinine 4.72 (H) 0.50 - 1.05 mg/dL    eGFR 11 (L) >60 mL/min/1.73m*2    Calcium 6.8 (L) 8.6 - 10.3 mg/dL    Phosphorus 5.2 (H) 2.5 - 4.9 mg/dL    Albumin 3.1 (L) 3.4 - 5.0 g/dL   Magnesium   Result Value Ref Range    Magnesium 1.73 1.60 - 2.40 mg/dL   CBC   Result Value Ref Range    WBC 5.0 4.4 - 11.3 x10*3/uL    nRBC 0.0 0.0 - 0.0 /100 WBCs    RBC 3.12 (L) 4.00 - 5.20 x10*6/uL    Hemoglobin 9.5 (L) 12.0 - 16.0 g/dL    Hematocrit 31.0 (L) 36.0 - 46.0 %    MCV 99 80 - 100 fL    MCH 30.4 26.0 - 34.0 pg    MCHC 30.6 (L) 32.0 - 36.0 g/dL    RDW 16.8 (H) 11.5 - 14.5 %    Platelets 135 (L) 150 - 450 x10*3/uL   Renal Function Panel   Result Value Ref Range    Glucose 113 (H) 74 - 99 mg/dL    Sodium 136 136 - 145 mmol/L    Potassium 5.0 3.5 - 5.3 mmol/L    Chloride 98 98 - 107 mmol/L    Bicarbonate 27 21 - 32 mmol/L    Anion Gap 16 10 - 20 mmol/L    Urea Nitrogen 22 6 - 23 mg/dL    Creatinine 3.90 (H) 0.50 - 1.05 mg/dL    eGFR 13 (L) >60 mL/min/1.73m*2    Calcium 7.0 (L) 8.6 - 10.3 mg/dL    Phosphorus 4.5 2.5 - 4.9 mg/dL    Albumin 3.1 (L) 3.4 - 5.0 g/dL   Magnesium   Result Value Ref Range    Magnesium 1.72 1.60 - 2.40 mg/dL   CBC   Result Value Ref Range    WBC 8.1 4.4 - 11.3 x10*3/uL    nRBC 0.0 0.0 - 0.0 /100 WBCs    RBC 3.30 (L) 4.00 - 5.20 x10*6/uL    Hemoglobin 10.1 (L) 12.0 - 16.0 g/dL    Hematocrit 32.4 (L) 36.0 - 46.0 %    MCV 98 80 - 100 fL    MCH 30.6 26.0 - 34.0 pg    MCHC 31.2 (L) 32.0 - 36.0 g/dL    RDW 16.7 (H) 11.5 - 14.5 %    Platelets 145 (L) 150 - 450 x10*3/uL   Renal Function Panel   Result Value Ref Range    Glucose 97 74 - 99 mg/dL    Sodium 136 136 - 145 mmol/L    Potassium 5.3 3.5 - 5.3 mmol/L    Chloride 97 (L) 98 - 107 mmol/L    Bicarbonate 24 21 - 32 mmol/L    Anion Gap 20 10 - 20 mmol/L    Urea Nitrogen 31 (H) 6 - 23  mg/dL    Creatinine 5.58 (H) 0.50 - 1.05 mg/dL    eGFR 9 (L) >60 mL/min/1.73m*2    Calcium 7.0 (L) 8.6 - 10.3 mg/dL    Phosphorus 5.5 (H) 2.5 - 4.9 mg/dL    Albumin 3.3 (L) 3.4 - 5.0 g/dL   Magnesium   Result Value Ref Range    Magnesium 1.74 1.60 - 2.40 mg/dL   Vancomycin   Result Value Ref Range    Vancomycin 42.6 (H) 5.0 - 20.0 ug/mL   Protime-INR   Result Value Ref Range    Protime 11.7 9.3 - 12.7 seconds    INR 1.1 0.9 - 1.2         Assessment & Plan:     End-stage renal disease to dialyze the patient today through the left femoral temporary dialysis catheter  Hyperkalemia solved  Infection of dialysis catheter site and malfunctioning dialysis catheter- IR consulted for tunneled dialysis catheter exchange    Zhou Pedersen MD         [1]   Current Facility-Administered Medications:     acetaminophen (Tylenol) tablet 650 mg, 650 mg, oral, q6h PRN, Noelle Doss APRN-CNP    aspirin EC tablet 81 mg, 81 mg, oral, Daily, Noelle Doss APRN-CNP, 81 mg at 04/20/25 0814    atorvastatin (Lipitor) tablet 40 mg, 40 mg, oral, Daily, Noelle Doss APRN-CNP, 40 mg at 04/20/25 0814    calcitriol (Rocaltrol) capsule 0.5 mcg, 0.5 mcg, oral, Daily, ORI Kincaid-CNP, 0.5 mcg at 04/20/25 0814    cloNIDine (Catapres) tablet 0.1 mg, 0.1 mg, oral, q8h KIMBERLY, Noelle Doss APRN-CNP, 0.1 mg at 04/21/25 0531    diphenhydrAMINE (BENADryl) injection 50 mg, 50 mg, intravenous, q3h PRN, Noelle Doss APRN-CNP, 50 mg at 04/21/25 0849    epoetin brandy-epbx (Retacrit) injection 6,440 Units, 100 Units/kg, subcutaneous, Once per day on Monday Wednesday Friday, ANGELA Kincaid, 6,440 Units at 04/18/25 1850    ergocalciferol (Vitamin D-2) capsule 1.25 mg, 1.25 mg, oral, Every Sunday, ANGELA Kincaid, 1.25 mg at 04/20/25 0814    heparin 1,000 unit/mL injection 1,000 Units, 1,000 Units, intra-catheter, After Dialysis, ANGELA Cheatham    HYDROmorphone (Dilaudid) injection 0.6 mg, 0.6 mg,  intravenous, q3h PRN, ORI Kincaid-CNP, 0.6 mg at 04/21/25 0849    hydrOXYzine HCL (Atarax) tablet 25 mg, 25 mg, oral, q6h PRN, ANGELA Michael    levETIRAcetam (Keppra) tablet 750 mg, 750 mg, oral, Nightly, ANGELA Kincaid, 750 mg at 04/18/25 2125    [Held by provider] methocarbamol (Robaxin) tablet 750 mg, 750 mg, oral, q6h PRN, Gloria Carrion PA-C    metoprolol tartrate (Lopressor) tablet 25 mg, 25 mg, oral, BID, ANGELA Kincaid, 25 mg at 04/20/25 2031    mirtazapine (Remeron) tablet 7.5 mg, 7.5 mg, oral, Nightly, ANGELA Michael, 7.5 mg at 04/20/25 2032    mupirocin (Bactroban) 2 % ointment, , Topical, TID, ANGELA Huynh    oxyCODONE-acetaminophen (Percocet)  mg per tablet 1 tablet, 1 tablet, oral, q6h PRN, ANGELA Kincaid, 1 tablet at 04/18/25 0632    oxyCODONE-acetaminophen (Percocet) 5-325 mg per tablet 1 tablet, 1 tablet, oral, q6h PRN, ANGELA Kincaid    piperacillin-tazobactam (Zosyn) 2.25 g in dextrose (iso) IV 50 mL, 2.25 g, intravenous, q8h, ANGELA Kincaid, Stopped at 04/21/25 0607    pregabalin (Lyrica) capsule 50 mg, 50 mg, oral, BID, ORI Kincaid-CNP, 50 mg at 04/20/25 0814    promethazine (Phenergan) tablet 25 mg, 25 mg, oral, Daily PRN, ANGELA Kincaid, 25 mg at 04/21/25 0705    sodium chloride 0.9 % bolus 200 mL, 200 mL, intravenous, q1h PRN, Vu Pedersen MD    vancomycin (Vancocin) pharmacy to dose - pharmacy monitoring, , miscellaneous, Daily PRN, Noelle Doss, APRN-CNP

## 2025-04-21 NOTE — PROGRESS NOTES
"Batsheva Page is a 49 y.o. female on day 4 of admission presenting with Right foot infection.    Subjective   Patient resting in bed. Denies chest pain, shortness of breath, abdominal pain. Has pain at dialysis catheter sites. Current regimen is keeping pain manageable.        Objective     Physical Exam  Constitutional:       Appearance: She is ill-appearing.   HENT:      Head: Normocephalic and atraumatic.      Mouth/Throat:      Mouth: Mucous membranes are moist.      Pharynx: Oropharynx is clear.   Eyes:      Extraocular Movements: Extraocular movements intact.      Conjunctiva/sclera: Conjunctivae normal.      Pupils: Pupils are equal, round, and reactive to light.   Cardiovascular:      Rate and Rhythm: Normal rate and regular rhythm.      Pulses: Normal pulses.      Heart sounds: Normal heart sounds.   Pulmonary:      Effort: Pulmonary effort is normal.      Breath sounds: Normal breath sounds.   Abdominal:      General: Bowel sounds are normal.      Palpations: Abdomen is soft.      Tenderness: There is no abdominal tenderness.   Musculoskeletal:         General: Normal range of motion.      Cervical back: Normal range of motion and neck supple.   Skin:     General: Skin is warm and dry.      Capillary Refill: Capillary refill takes less than 2 seconds.   Neurological:      General: No focal deficit present.      Mental Status: She is alert and oriented to person, place, and time.   Psychiatric:         Mood and Affect: Mood normal.         Behavior: Behavior normal.         Last Recorded Vitals  Blood pressure 166/89, pulse 69, temperature 35.7 °C (96.2 °F), temperature source Temporal, resp. rate 20, height 1.727 m (5' 8\"), weight 71.4 kg (157 lb 6.5 oz), SpO2 97%.  Intake/Output last 3 Shifts:  I/O last 3 completed shifts:  In: 970 (13.6 mL/kg) [P.O.:720; IV Piggyback:250]  Out: 0 (0 mL/kg)   Weight: 71.4 kg     Relevant Results  Results for orders placed or performed during the hospital encounter " of 04/17/25 (from the past 24 hours)   CBC   Result Value Ref Range    WBC 8.1 4.4 - 11.3 x10*3/uL    nRBC 0.0 0.0 - 0.0 /100 WBCs    RBC 3.30 (L) 4.00 - 5.20 x10*6/uL    Hemoglobin 10.1 (L) 12.0 - 16.0 g/dL    Hematocrit 32.4 (L) 36.0 - 46.0 %    MCV 98 80 - 100 fL    MCH 30.6 26.0 - 34.0 pg    MCHC 31.2 (L) 32.0 - 36.0 g/dL    RDW 16.7 (H) 11.5 - 14.5 %    Platelets 145 (L) 150 - 450 x10*3/uL   Renal Function Panel   Result Value Ref Range    Glucose 97 74 - 99 mg/dL    Sodium 136 136 - 145 mmol/L    Potassium 5.3 3.5 - 5.3 mmol/L    Chloride 97 (L) 98 - 107 mmol/L    Bicarbonate 24 21 - 32 mmol/L    Anion Gap 20 10 - 20 mmol/L    Urea Nitrogen 31 (H) 6 - 23 mg/dL    Creatinine 5.58 (H) 0.50 - 1.05 mg/dL    eGFR 9 (L) >60 mL/min/1.73m*2    Calcium 7.0 (L) 8.6 - 10.3 mg/dL    Phosphorus 5.5 (H) 2.5 - 4.9 mg/dL    Albumin 3.3 (L) 3.4 - 5.0 g/dL   Magnesium   Result Value Ref Range    Magnesium 1.74 1.60 - 2.40 mg/dL   Vancomycin   Result Value Ref Range    Vancomycin 42.6 (H) 5.0 - 20.0 ug/mL   Protime-INR   Result Value Ref Range    Protime 11.7 9.3 - 12.7 seconds    INR 1.1 0.9 - 1.2         Assessment & Plan  Hyperkalemia    Dialysis catheter site infection  Patient does have a history of bacteremia and endocarditis  Right groin dialysis catheter with erythema, edema, tenderness, drainage  Blood cultures negative to date  Continue IV antibiotics - per ID recs   Follow cultures - blood cultures negative to date   Right groin dialysis catheter in place however nonfunctioning and infected, will need removed  Temporary catheter has been placed in the left groin without signs or symptoms of infection currently.  This will need exchanged with permanent dialysis catheter prior to discharge.  - Anesthesia coverage available in IR on Thursday 4/24. Plan for procedure to remove and replace dialysis cath   ID recommending resuming vancomycin dwell after each dialysis session this  Pain management    ESRD on HD  Right groin  dialysis catheter infected and nonfunctioning, will need removed prior to discharge  Left femoral temporary HD catheter placed 4/17  Potassium 6.1 on arrival - stat dialysis done   - improved   Continue home Retacrit, Calcitrol  IR consulted for tunneled line exchange  Nephrology consulted  Receives Benadryl prior to HD for itching, continue    HTN  Continue home metoprolol and clonidine  Monitor blood pressure close    Coronary artery disease  Continue aspirin and statin    Seizure  Seizure precautions  Continue Keppra     Anxiety/depression  Psych consulted, appreciate recs  Remeron and Atarax ordered    Anemia secondary to chronic renal disease  Continue home Retacrit  Transfuse for Hgb > 7        PLAN:  DVT prophylaxis: SCDs, patient had declined pharmacologic prophylaxis  Renal diet  Antibiotics  Follow cultures  IR consulted for tunneled line exchange  Plan for NPO after midnight 4/24  CBC, PT, and BMP in the morning  Monitor on telemetry  Hemodialysis per nephrology    Please continue current pain regimen: Dilaudid 0.6mg Q 3 hours, Benadryl 50mg IV Q 3 hours. May be given together. She has been tolerating and this is the regimen that has worked for her on previous admissions.     Can consider pall/med consult if pain becomes unmanageable     CODE STATUS: FULL CODE        Vania Escalante, APRN-CNP

## 2025-04-21 NOTE — NURSING NOTE
Vascular access note    Patient with Lt femoral gustavo, dressing changed today, blood noted on chg disc, pigtail flushes easily and with positive blood return, clamped and curos cap applied.

## 2025-04-21 NOTE — PROGRESS NOTES
"INFECTIOUS DISEASES PROGRESS NOTE    Consulted / following patient for:  Infected vascular catheter    Subjective   Interval History:   Complains of pain at the nonfunctioning right femoral dialysis catheter site.  Exasperated by the need to wait several more days in order to have the procedure done under general anesthesia, and declines to have it done without general anesthesia     Objective   PHYSICAL EXAMINATION  Vital signs:  Visit Vitals  /77 (BP Location: Right arm, Patient Position: Lying)   Pulse 69   Temp 36.3 °C (97.3 °F) (Oral)   Resp 19      General: Not toxic  Extremities: No change in the appearance of the nonfunctioning right femoral dialysis catheter.  Temporary left femoral dialysis catheter in place    Relevant Results  WBC: 8100    Microbiology:  Blood: Negative X4    ASSESSMENT:  Nonfunctioning femoral dialysis catheter  Patient has an extensive history of nonfunctioning and infected dialysis catheters and has had multiple episodes of high-grade MRSA bacteremia.  Most recently, she has been treated with a 5 mg/mL vancomycin \"dwell\" after each dialysis session.  At this time there is no evidence for catheter-associated bacteremia     PLANS:  -   Continue Zosyn and post-dialysis vancomycin pending removal of the nonfunctioning right femoral dialysis catheter  -   Suggest continuing vancomycin 5 mg/mL \"dwell\" after each dialysis session    Adama Soto MD  [email or web address]  Office:  [phone]  "

## 2025-04-21 NOTE — NURSING NOTE
Blood pressure slightly elevated, pt stated she was having a lot of pain, she was just medicated about 15 minutes ago with pain medication see MAR, pt denies any needs at this time, bed locked and low position. Belongings in reach. Call light in reach. Plan of care ongoing.

## 2025-04-21 NOTE — PROGRESS NOTES
"Batsheva Page is a 49 y.o. female on day 4 of admission presenting with Right foot infection.      Subjective   Patient’s chart was reviewed, and her case was discussed with the nursing staff. Per nursing report, patient is cooperative, and compliant with her medications.  Patient was seen in her assigned room, sitting on the bed, and was engaged throughout the encounter. Patient expressed concerns regarding the replacement of her dialysis catheter, stating that she does not wish to undergo the procedure without general anesthesia. She reported that general anesthesia services will not be available until Thursday, and she is therefore awaiting the procedure until then. Patient denied current suicidal or homicidal ideation, as well as auditory or visual hallucinations. She described her mood as “ok.” She reported poor sleep the previous night but noted that her appetite is \"so-so to ok.' No adverse effects were reported from Remeron. In light of her ongoing symptoms, Remeron dose will be increased to 15 mg starting tonight.       Objective     Last Recorded Vitals  Blood pressure (!) 147/96, pulse 73, temperature 36.4 °C (97.5 °F), temperature source Oral, resp. rate 19, height 1.727 m (5' 8\"), weight 71.4 kg (157 lb 6.5 oz), SpO2 94%.    Sleep Log  No Safety Checks orders active in given range     Review of Systems    Psychiatric ROS - Adult  Depression: fatigue or loss of energy, markedly diminished interest or pleasure in all or most activities, and changes in appetite or weight leading to significant weight loss or gain unintentionally   Anxiety: restlessness or feeling keyed up or on edge and irritability  Suzanne: negative  Psychosis: negative  Delirium: negative   Trauma: negative     Physical Exam     Mental Status Exam  General: 49 years old female, lying in bed during interview.  Appearance: Appeared as age stated; appropriately dressed/groomed.  Attitude:  calm, cooperative  Behavior: Fair EC; overall " "responding appropriately  Motor Activity: No notable boby PMAR  Speech: Clear, with fair phonation, and no lisp nor dysarthria.   Mood: \"ok\"  Affect: appropriate and mood congruent  Thought Process: Linear and logical; not perseverating   Thought Content: Denied SI/HI. Not voicing/endorsing delusions.  Thought Perception: Did not appear to be responding to internal stimuli. Not endorsing AVH  Cognition: Grossly intact; A&O x4/4 to self, place, date, and context.  Insight: Good as evidenced by patient's awareness and understanding of symptoms and benefit of treatment  Judgement: good, compliant with her scheduled medications     Psychiatric Risk Assessment  Violence Risk Factors:  current psychiatric illness and stress/destabilizers  Acute Risk of Harm to Others is Considered: Low  Suicide Risk Factors: chronic medical illness, chronic pain, and current psychiatric illness  Protective Factors: social support/connectedness and hopefulness/future-orientation  Acute Risk of Harm to Self is Considered: Low    Relevant Results  Results for orders placed or performed during the hospital encounter of 04/17/25 (from the past 96 hours)   Blood Culture    Specimen: Peripheral Venipuncture; Blood culture   Result Value Ref Range    Blood Culture No growth at 3 days    Blood Culture    Specimen: Peripheral Venipuncture; Blood culture   Result Value Ref Range    Blood Culture No growth at 3 days    CBC and Auto Differential   Result Value Ref Range    WBC 5.8 4.4 - 11.3 x10*3/uL    nRBC 0.0 0.0 - 0.0 /100 WBCs    RBC 3.60 (L) 4.00 - 5.20 x10*6/uL    Hemoglobin 11.1 (L) 12.0 - 16.0 g/dL    Hematocrit 35.4 (L) 36.0 - 46.0 %    MCV 98 80 - 100 fL    MCH 30.8 26.0 - 34.0 pg    MCHC 31.4 (L) 32.0 - 36.0 g/dL    RDW 16.5 (H) 11.5 - 14.5 %    Platelets 166 150 - 450 x10*3/uL    Neutrophils % 64.6 40.0 - 80.0 %    Immature Granulocytes %, Automated 0.2 0.0 - 0.9 %    Lymphocytes % 18.7 13.0 - 44.0 %    Monocytes % 6.2 2.0 - 10.0 %    " Eosinophils % 9.6 0.0 - 6.0 %    Basophils % 0.7 0.0 - 2.0 %    Neutrophils Absolute 3.77 1.20 - 7.70 x10*3/uL    Immature Granulocytes Absolute, Automated 0.01 0.00 - 0.70 x10*3/uL    Lymphocytes Absolute 1.09 (L) 1.20 - 4.80 x10*3/uL    Monocytes Absolute 0.36 0.10 - 1.00 x10*3/uL    Eosinophils Absolute 0.56 0.00 - 0.70 x10*3/uL    Basophils Absolute 0.04 0.00 - 0.10 x10*3/uL   Comprehensive metabolic panel   Result Value Ref Range    Glucose 82 74 - 99 mg/dL    Sodium 131 (L) 136 - 145 mmol/L    Potassium 8.2 (HH) 3.5 - 5.3 mmol/L    Chloride 90 (L) 98 - 107 mmol/L    Bicarbonate 30 21 - 32 mmol/L    Anion Gap 19 10 - 20 mmol/L    Urea Nitrogen 41 (H) 6 - 23 mg/dL    Creatinine 6.94 (H) 0.50 - 1.05 mg/dL    eGFR 7 (L) >60 mL/min/1.73m*2    Calcium 7.2 (L) 8.6 - 10.3 mg/dL    Albumin 3.8 3.4 - 5.0 g/dL    Alkaline Phosphatase 68 33 - 110 U/L    Total Protein 8.6 (H) 6.4 - 8.2 g/dL    AST 17 9 - 39 U/L    Bilirubin, Total 0.5 0.0 - 1.2 mg/dL    ALT 9 7 - 45 U/L   Lactate   Result Value Ref Range    Lactate 0.6 0.4 - 2.0 mmol/L   Basic metabolic panel   Result Value Ref Range    Glucose 85 74 - 99 mg/dL    Sodium 132 (L) 136 - 145 mmol/L    Potassium 7.6 (HH) 3.5 - 5.3 mmol/L    Chloride 92 (L) 98 - 107 mmol/L    Bicarbonate 29 21 - 32 mmol/L    Anion Gap 19 10 - 20 mmol/L    Urea Nitrogen 41 (H) 6 - 23 mg/dL    Creatinine 6.95 (H) 0.50 - 1.05 mg/dL    eGFR 7 (L) >60 mL/min/1.73m*2    Calcium 6.9 (L) 8.6 - 10.3 mg/dL   POCT GLUCOSE   Result Value Ref Range    POCT Glucose 82 74 - 99 mg/dL   ECG 12 lead   Result Value Ref Range    Ventricular Rate 73 BPM    Atrial Rate 73 BPM    UT Interval 202 ms    QRS Duration 74 ms    QT Interval 392 ms    QTC Calculation(Bazett) 431 ms    P Axis 55 degrees    R Axis -12 degrees    T Axis 55 degrees    QRS Count 13 beats    Q Onset 223 ms    P Onset 122 ms    P Offset 178 ms    T Offset 419 ms    QTC Fredericia 418 ms   Staphylococcus aureus/MRSA colonization, Culture     Specimen: Anterior Nares; Swab   Result Value Ref Range    Staph/MRSA Screen Culture (A)      Isolated: Methicillin Susceptible Staphylococcus aureus (MSSA)   CBC   Result Value Ref Range    WBC 5.4 4.4 - 11.3 x10*3/uL    nRBC 0.0 0.0 - 0.0 /100 WBCs    RBC 3.25 (L) 4.00 - 5.20 x10*6/uL    Hemoglobin 10.0 (L) 12.0 - 16.0 g/dL    Hematocrit 31.8 (L) 36.0 - 46.0 %    MCV 98 80 - 100 fL    MCH 30.8 26.0 - 34.0 pg    MCHC 31.4 (L) 32.0 - 36.0 g/dL    RDW 16.3 (H) 11.5 - 14.5 %    Platelets 129 (L) 150 - 450 x10*3/uL   Magnesium   Result Value Ref Range    Magnesium 1.62 1.60 - 2.40 mg/dL   Renal Function Panel   Result Value Ref Range    Glucose 117 (H) 74 - 99 mg/dL    Sodium 133 (L) 136 - 145 mmol/L    Potassium 4.2 3.5 - 5.3 mmol/L    Chloride 94 (L) 98 - 107 mmol/L    Bicarbonate 29 21 - 32 mmol/L    Anion Gap 14 10 - 20 mmol/L    Urea Nitrogen 9 6 - 23 mg/dL    Creatinine 2.18 (H) 0.50 - 1.05 mg/dL    eGFR 27 (L) >60 mL/min/1.73m*2    Calcium 7.8 (L) 8.6 - 10.3 mg/dL    Phosphorus 2.5 2.5 - 4.9 mg/dL    Albumin 3.3 (L) 3.4 - 5.0 g/dL   CBC   Result Value Ref Range    WBC 4.9 4.4 - 11.3 x10*3/uL    nRBC 0.0 0.0 - 0.0 /100 WBCs    RBC 3.17 (L) 4.00 - 5.20 x10*6/uL    Hemoglobin 9.7 (L) 12.0 - 16.0 g/dL    Hematocrit 31.5 (L) 36.0 - 46.0 %    MCV 99 80 - 100 fL    MCH 30.6 26.0 - 34.0 pg    MCHC 30.8 (L) 32.0 - 36.0 g/dL    RDW 16.7 (H) 11.5 - 14.5 %    Platelets 129 (L) 150 - 450 x10*3/uL   Renal Function Panel   Result Value Ref Range    Glucose 84 74 - 99 mg/dL    Sodium 135 (L) 136 - 145 mmol/L    Potassium 6.1 (HH) 3.5 - 5.3 mmol/L    Chloride 97 (L) 98 - 107 mmol/L    Bicarbonate 26 21 - 32 mmol/L    Anion Gap 18 10 - 20 mmol/L    Urea Nitrogen 26 (H) 6 - 23 mg/dL    Creatinine 4.72 (H) 0.50 - 1.05 mg/dL    eGFR 11 (L) >60 mL/min/1.73m*2    Calcium 6.8 (L) 8.6 - 10.3 mg/dL    Phosphorus 5.2 (H) 2.5 - 4.9 mg/dL    Albumin 3.1 (L) 3.4 - 5.0 g/dL   Magnesium   Result Value Ref Range    Magnesium 1.73 1.60 - 2.40  mg/dL   CBC   Result Value Ref Range    WBC 5.0 4.4 - 11.3 x10*3/uL    nRBC 0.0 0.0 - 0.0 /100 WBCs    RBC 3.12 (L) 4.00 - 5.20 x10*6/uL    Hemoglobin 9.5 (L) 12.0 - 16.0 g/dL    Hematocrit 31.0 (L) 36.0 - 46.0 %    MCV 99 80 - 100 fL    MCH 30.4 26.0 - 34.0 pg    MCHC 30.6 (L) 32.0 - 36.0 g/dL    RDW 16.8 (H) 11.5 - 14.5 %    Platelets 135 (L) 150 - 450 x10*3/uL   Renal Function Panel   Result Value Ref Range    Glucose 113 (H) 74 - 99 mg/dL    Sodium 136 136 - 145 mmol/L    Potassium 5.0 3.5 - 5.3 mmol/L    Chloride 98 98 - 107 mmol/L    Bicarbonate 27 21 - 32 mmol/L    Anion Gap 16 10 - 20 mmol/L    Urea Nitrogen 22 6 - 23 mg/dL    Creatinine 3.90 (H) 0.50 - 1.05 mg/dL    eGFR 13 (L) >60 mL/min/1.73m*2    Calcium 7.0 (L) 8.6 - 10.3 mg/dL    Phosphorus 4.5 2.5 - 4.9 mg/dL    Albumin 3.1 (L) 3.4 - 5.0 g/dL   Magnesium   Result Value Ref Range    Magnesium 1.72 1.60 - 2.40 mg/dL   CBC   Result Value Ref Range    WBC 8.1 4.4 - 11.3 x10*3/uL    nRBC 0.0 0.0 - 0.0 /100 WBCs    RBC 3.30 (L) 4.00 - 5.20 x10*6/uL    Hemoglobin 10.1 (L) 12.0 - 16.0 g/dL    Hematocrit 32.4 (L) 36.0 - 46.0 %    MCV 98 80 - 100 fL    MCH 30.6 26.0 - 34.0 pg    MCHC 31.2 (L) 32.0 - 36.0 g/dL    RDW 16.7 (H) 11.5 - 14.5 %    Platelets 145 (L) 150 - 450 x10*3/uL   Renal Function Panel   Result Value Ref Range    Glucose 97 74 - 99 mg/dL    Sodium 136 136 - 145 mmol/L    Potassium 5.3 3.5 - 5.3 mmol/L    Chloride 97 (L) 98 - 107 mmol/L    Bicarbonate 24 21 - 32 mmol/L    Anion Gap 20 10 - 20 mmol/L    Urea Nitrogen 31 (H) 6 - 23 mg/dL    Creatinine 5.58 (H) 0.50 - 1.05 mg/dL    eGFR 9 (L) >60 mL/min/1.73m*2    Calcium 7.0 (L) 8.6 - 10.3 mg/dL    Phosphorus 5.5 (H) 2.5 - 4.9 mg/dL    Albumin 3.3 (L) 3.4 - 5.0 g/dL   Magnesium   Result Value Ref Range    Magnesium 1.74 1.60 - 2.40 mg/dL   Vancomycin   Result Value Ref Range    Vancomycin 42.6 (H) 5.0 - 20.0 ug/mL   Protime-INR   Result Value Ref Range    Protime 11.7 9.3 - 12.7 seconds    INR 1.1  "0.9 - 1.2   Pertinent labs were reviewed    Scheduled medications  Scheduled Medications[1]  Continuous medications  Continuous Medications[2]  PRN medications  PRN Medications[3]    Assessment & Plan  Right foot infection    Hyperkalemia    Depression  Anxiety     IMPRESSION  49-year-old female with a complex medical history including ESRD on hemodialysis, multiple cardiac valve replacements, recurrent bacteremia, and seizure disorder, presenting with concern for dialysis catheter infection. She reports worsening depressive and anxiety symptoms over the past several months, including poor sleep, appetite, low energy, and anhedonia. These symptoms appear to be compounded by her chronic medical conditions and ongoing physical discomfort. She denies suicidal or homicidal ideation, hallucinations, or past suicide attempts, and reports feeling safe at home. Patient was previously evaluated by psychiatric services in May 2024 and started on Remeron 15 mg at bedtime. She does not recall taking this medication or the reason for its discontinuation. Given her current symptoms and willingness to engage in treatment, a re-initiation of Remeron was discussed and agreed upon.   04/19/25 Denies current SI/HI, AH/VH. Reports sleeping well last night. No adverse reactions from Remeron reported.   04/20/2025 patient is calm, compliant with meds, pleasant upon approach today. Denies SI/HI, AH/VH.   04/21/24 Patient reports she currently awaiting dialysis catheter replacement, which she postponed until general anesthesia services are available. Patient remains cooperative and compliant with medications, denies suicidal or homicidal ideation and perceptual disturbances. Notable for poor sleep and \"so-so to ok\" appetite. No adverse effects noted from Remeron. Plan to increase Remeron to 15 mg nightly to address ongoing symptoms.      PLAN/ RECOMMENDATIONS:      -Increased Remeron to 15 mg po nightly to help with sleep, appetite, and " mood/depression.   -Atarax 25 mg po every 6 hours as needed ordered for anxiety     - Patient does not currently meet criteria for inpatient psychiatric admission.      Medication Consent  Medication Consent: risks, benefits, side effects reviewed for all ordered meds and patient/caregiver expressed understanding and consent obtained.     -Thank you for allowing us to participate in the care of this patient. Psychiatry will continue to follow.       I spent 28 minutes in the professional and overall care of this patient.      Sterling Asif, ORI-CNP           [1] aspirin, 81 mg, oral, Daily  atorvastatin, 40 mg, oral, Daily  calcitriol, 0.5 mcg, oral, Daily  cloNIDine, 0.1 mg, oral, q8h KIMBERLY  epoetin brandy-epbx, 100 Units/kg, subcutaneous, Once per day on Monday Wednesday Friday  ergocalciferol, 1.25 mg, oral, Every Sunday  heparin, 1,000 Units, intra-catheter, After Dialysis  levETIRAcetam, 750 mg, oral, Nightly  metoprolol tartrate, 25 mg, oral, BID  mirtazapine, 15 mg, oral, Nightly  mupirocin, , Topical, TID  piperacillin-tazobactam, 2.25 g, intravenous, q8h  pregabalin, 50 mg, oral, BID  [2]    [3] PRN medications: acetaminophen, diphenhydrAMINE, HYDROmorphone, hydrOXYzine HCL, [Held by provider] methocarbamol, oxyCODONE-acetaminophen, oxyCODONE-acetaminophen, promethazine, sodium chloride, vancomycin

## 2025-04-21 NOTE — PROGRESS NOTES
Batsheva Page is a 49 y.o. female on day 4 of admission presenting with Right foot infection.    Patient to have replacement of tunneled dialysis catheter. She does not want to do it without anesthesia, no service available until Thu 04/24.   Plan is to discharge home with no skilled needs, family will transport.     Nerissa Goins RN

## 2025-04-21 NOTE — ASSESSMENT & PLAN NOTE
Dialysis catheter site infection  Patient does have a history of bacteremia and endocarditis  Right groin dialysis catheter with erythema, edema, tenderness, drainage  Blood cultures negative to date  Continue IV antibiotics - per ID recs   Follow cultures - blood cultures negative to date   Right groin dialysis catheter in place however nonfunctioning and infected, will need removed  Temporary catheter has been placed in the left groin without signs or symptoms of infection currently.  This will need exchanged with permanent dialysis catheter prior to discharge.  - Anesthesia coverage available in IR on Thursday 4/24. Plan for procedure to remove and replace dialysis cath   ID recommending resuming vancomycin dwell after each dialysis session this  Pain management    ESRD on HD  Right groin dialysis catheter infected and nonfunctioning, will need removed prior to discharge  Left femoral temporary HD catheter placed 4/17  Potassium 6.1 on arrival - stat dialysis done   - improved   Continue home Retacrit, Calcitrol  IR consulted for tunneled line exchange  Nephrology consulted  Receives Benadryl prior to HD for itching, continue    HTN  Continue home metoprolol and clonidine  Monitor blood pressure close    Coronary artery disease  Continue aspirin and statin    Seizure  Seizure precautions  Continue Keppra     Anxiety/depression  Psych consulted, appreciate recs  Remeron and Atarax ordered    Anemia secondary to chronic renal disease  Continue home Retacrit  Transfuse for Hgb > 7        PLAN:  DVT prophylaxis: SCDs, patient had declined pharmacologic prophylaxis  Renal diet  Antibiotics  Follow cultures  IR consulted for tunneled line exchange  Plan for NPO after midnight 4/24  CBC, PT, and BMP in the morning  Monitor on telemetry  Hemodialysis per nephrology    Please continue current pain regimen: Dilaudid 0.6mg Q 3 hours, Benadryl 50mg IV Q 3 hours. May be given together. She has been tolerating and this is  the regimen that has worked for her on previous admissions.     Can consider pall/med consult if pain becomes unmanageable     CODE STATUS: FULL CODE

## 2025-04-21 NOTE — PRE-PROCEDURE NOTE
"Report from Sending RN:    Report From: Florida Simpson RN  Recent Surgery of Procedure: No  Baseline Level of Consciousness (LOC): AOx3  Oxygen Use: No  Type: N/A  Diabetic: No  Last BP Med Given Day of Dialysis: see EMR  Last Pain Med Given: see EMR  Lab Tests to be Obtained with Dialysis: No  Blood Transfusion to be Given During Dialysis: No  Available IV Access: Yes  Medications to be Administered During Dialysis: No  Continuous IV Infusion Running: No  Restraints on Currently or in the Last 24 Hours: No  Hand-Off Communication: Pt is \"OK\" to come to the HD room. Vitals were 184/92 while in her room. She was medicated for hypertension and pain before leaving the unit  Dialysis Catheter Dressing: Clean dry and intact  Last Dressing Change: 4/21/25   "

## 2025-04-22 LAB
ALBUMIN SERPL BCP-MCNC: 3.3 G/DL (ref 3.4–5)
ANION GAP SERPL CALCULATED.3IONS-SCNC: 17 MMOL/L (ref 10–20)
BUN SERPL-MCNC: 17 MG/DL (ref 6–23)
CALCIUM SERPL-MCNC: 7.5 MG/DL (ref 8.6–10.3)
CHLORIDE SERPL-SCNC: 95 MMOL/L (ref 98–107)
CO2 SERPL-SCNC: 29 MMOL/L (ref 21–32)
CREAT SERPL-MCNC: 3.88 MG/DL (ref 0.5–1.05)
EGFRCR SERPLBLD CKD-EPI 2021: 14 ML/MIN/1.73M*2
ERYTHROCYTE [DISTWIDTH] IN BLOOD BY AUTOMATED COUNT: 16.5 % (ref 11.5–14.5)
GLUCOSE SERPL-MCNC: 99 MG/DL (ref 74–99)
HCT VFR BLD AUTO: 31.4 % (ref 36–46)
HGB BLD-MCNC: 10 G/DL (ref 12–16)
MAGNESIUM SERPL-MCNC: 1.84 MG/DL (ref 1.6–2.4)
MCH RBC QN AUTO: 30.7 PG (ref 26–34)
MCHC RBC AUTO-ENTMCNC: 31.8 G/DL (ref 32–36)
MCV RBC AUTO: 96 FL (ref 80–100)
NRBC BLD-RTO: 0 /100 WBCS (ref 0–0)
PHOSPHATE SERPL-MCNC: 4.8 MG/DL (ref 2.5–4.9)
PLATELET # BLD AUTO: 144 X10*3/UL (ref 150–450)
POTASSIUM SERPL-SCNC: 4.4 MMOL/L (ref 3.5–5.3)
RBC # BLD AUTO: 3.26 X10*6/UL (ref 4–5.2)
SODIUM SERPL-SCNC: 137 MMOL/L (ref 136–145)
VANCOMYCIN SERPL-MCNC: 31.8 UG/ML (ref 5–20)
WBC # BLD AUTO: 5.4 X10*3/UL (ref 4.4–11.3)

## 2025-04-22 PROCEDURE — 2500000001 HC RX 250 WO HCPCS SELF ADMINISTERED DRUGS (ALT 637 FOR MEDICARE OP)

## 2025-04-22 PROCEDURE — 85027 COMPLETE CBC AUTOMATED: CPT | Performed by: NURSE PRACTITIONER

## 2025-04-22 PROCEDURE — 36591 DRAW BLOOD OFF VENOUS DEVICE: CPT | Performed by: NURSE PRACTITIONER

## 2025-04-22 PROCEDURE — 99233 SBSQ HOSP IP/OBS HIGH 50: CPT

## 2025-04-22 PROCEDURE — 2500000004 HC RX 250 GENERAL PHARMACY W/ HCPCS (ALT 636 FOR OP/ED)

## 2025-04-22 PROCEDURE — 2500000004 HC RX 250 GENERAL PHARMACY W/ HCPCS (ALT 636 FOR OP/ED): Mod: JZ | Performed by: NURSE PRACTITIONER

## 2025-04-22 PROCEDURE — 2500000001 HC RX 250 WO HCPCS SELF ADMINISTERED DRUGS (ALT 637 FOR MEDICARE OP): Performed by: INTERNAL MEDICINE

## 2025-04-22 PROCEDURE — 83735 ASSAY OF MAGNESIUM: CPT | Performed by: NURSE PRACTITIONER

## 2025-04-22 PROCEDURE — 80069 RENAL FUNCTION PANEL: CPT | Performed by: NURSE PRACTITIONER

## 2025-04-22 PROCEDURE — 80202 ASSAY OF VANCOMYCIN: CPT | Performed by: NURSE PRACTITIONER

## 2025-04-22 PROCEDURE — 2500000004 HC RX 250 GENERAL PHARMACY W/ HCPCS (ALT 636 FOR OP/ED): Performed by: NURSE PRACTITIONER

## 2025-04-22 PROCEDURE — 2060000001 HC INTERMEDIATE ICU ROOM DAILY

## 2025-04-22 PROCEDURE — 2500000002 HC RX 250 W HCPCS SELF ADMINISTERED DRUGS (ALT 637 FOR MEDICARE OP, ALT 636 FOR OP/ED): Performed by: NURSE PRACTITIONER

## 2025-04-22 PROCEDURE — 99232 SBSQ HOSP IP/OBS MODERATE 35: CPT | Performed by: NURSE PRACTITIONER

## 2025-04-22 PROCEDURE — 99497 ADVNCD CARE PLAN 30 MIN: CPT

## 2025-04-22 PROCEDURE — 2500000001 HC RX 250 WO HCPCS SELF ADMINISTERED DRUGS (ALT 637 FOR MEDICARE OP): Performed by: NURSE PRACTITIONER

## 2025-04-22 RX ORDER — HEPARIN SODIUM 1000 [USP'U]/ML
1000 INJECTION, SOLUTION INTRAVENOUS; SUBCUTANEOUS
Status: CANCELLED | OUTPATIENT
Start: 2025-04-23

## 2025-04-22 RX ORDER — CLONIDINE HYDROCHLORIDE 0.2 MG/1
0.2 TABLET ORAL EVERY 8 HOURS SCHEDULED
Status: DISCONTINUED | OUTPATIENT
Start: 2025-04-22 | End: 2025-04-25

## 2025-04-22 RX ORDER — HYDRALAZINE HYDROCHLORIDE 20 MG/ML
5 INJECTION INTRAMUSCULAR; INTRAVENOUS EVERY 6 HOURS PRN
Status: DISCONTINUED | OUTPATIENT
Start: 2025-04-22 | End: 2025-04-27 | Stop reason: HOSPADM

## 2025-04-22 RX ADMIN — HYDROMORPHONE HYDROCHLORIDE 0.6 MG: 1 INJECTION, SOLUTION INTRAMUSCULAR; INTRAVENOUS; SUBCUTANEOUS at 22:39

## 2025-04-22 RX ADMIN — CLONIDINE HYDROCHLORIDE 0.2 MG: 0.2 TABLET ORAL at 16:30

## 2025-04-22 RX ADMIN — HYDROMORPHONE HYDROCHLORIDE 0.6 MG: 1 INJECTION, SOLUTION INTRAMUSCULAR; INTRAVENOUS; SUBCUTANEOUS at 03:57

## 2025-04-22 RX ADMIN — CLONIDINE HYDROCHLORIDE 0.1 MG: 0.2 TABLET ORAL at 06:44

## 2025-04-22 RX ADMIN — METOPROLOL TARTRATE 25 MG: 25 TABLET, FILM COATED ORAL at 21:50

## 2025-04-22 RX ADMIN — HYDROXYZINE HYDROCHLORIDE 25 MG: 25 TABLET, FILM COATED ORAL at 12:37

## 2025-04-22 RX ADMIN — HYDROMORPHONE HYDROCHLORIDE 0.6 MG: 1 INJECTION, SOLUTION INTRAMUSCULAR; INTRAVENOUS; SUBCUTANEOUS at 13:09

## 2025-04-22 RX ADMIN — DIPHENHYDRAMINE HYDROCHLORIDE 50 MG: 50 INJECTION, SOLUTION INTRAMUSCULAR; INTRAVENOUS at 03:57

## 2025-04-22 RX ADMIN — HYDROMORPHONE HYDROCHLORIDE 0.6 MG: 1 INJECTION, SOLUTION INTRAMUSCULAR; INTRAVENOUS; SUBCUTANEOUS at 19:29

## 2025-04-22 RX ADMIN — DIPHENHYDRAMINE HYDROCHLORIDE 50 MG: 50 INJECTION, SOLUTION INTRAMUSCULAR; INTRAVENOUS at 00:42

## 2025-04-22 RX ADMIN — DIPHENHYDRAMINE HYDROCHLORIDE 50 MG: 50 INJECTION, SOLUTION INTRAMUSCULAR; INTRAVENOUS at 22:39

## 2025-04-22 RX ADMIN — METOPROLOL TARTRATE 25 MG: 25 TABLET, FILM COATED ORAL at 12:39

## 2025-04-22 RX ADMIN — MIRTAZAPINE 15 MG: 15 TABLET, FILM COATED ORAL at 21:50

## 2025-04-22 RX ADMIN — HYDROMORPHONE HYDROCHLORIDE 0.6 MG: 1 INJECTION, SOLUTION INTRAMUSCULAR; INTRAVENOUS; SUBCUTANEOUS at 16:40

## 2025-04-22 RX ADMIN — MUPIROCIN: 20 OINTMENT TOPICAL at 21:53

## 2025-04-22 RX ADMIN — PIPERACILLIN SODIUM AND TAZOBACTAM SODIUM 2.25 G: 2; .25 INJECTION, SOLUTION INTRAVENOUS at 06:47

## 2025-04-22 RX ADMIN — PROMETHAZINE HYDROCHLORIDE 12.5 MG: 25 TABLET ORAL at 17:51

## 2025-04-22 RX ADMIN — HYDROMORPHONE HYDROCHLORIDE 0.6 MG: 1 INJECTION, SOLUTION INTRAMUSCULAR; INTRAVENOUS; SUBCUTANEOUS at 06:44

## 2025-04-22 RX ADMIN — PROMETHAZINE HYDROCHLORIDE 12.5 MG: 25 INJECTION INTRAMUSCULAR; INTRAVENOUS at 11:13

## 2025-04-22 RX ADMIN — PROMETHAZINE HYDROCHLORIDE 12.5 MG: 25 TABLET ORAL at 03:57

## 2025-04-22 RX ADMIN — HYDROMORPHONE HYDROCHLORIDE 0.6 MG: 1 INJECTION, SOLUTION INTRAMUSCULAR; INTRAVENOUS; SUBCUTANEOUS at 09:57

## 2025-04-22 RX ADMIN — PIPERACILLIN SODIUM AND TAZOBACTAM SODIUM 2.25 G: 2; .25 INJECTION, SOLUTION INTRAVENOUS at 22:41

## 2025-04-22 RX ADMIN — PIPERACILLIN SODIUM AND TAZOBACTAM SODIUM 2.25 G: 2; .25 INJECTION, SOLUTION INTRAVENOUS at 13:10

## 2025-04-22 RX ADMIN — HYDROXYZINE HYDROCHLORIDE 25 MG: 25 TABLET, FILM COATED ORAL at 01:40

## 2025-04-22 RX ADMIN — DIPHENHYDRAMINE HYDROCHLORIDE 50 MG: 50 INJECTION, SOLUTION INTRAMUSCULAR; INTRAVENOUS at 16:35

## 2025-04-22 RX ADMIN — HYDROMORPHONE HYDROCHLORIDE 0.6 MG: 1 INJECTION, SOLUTION INTRAMUSCULAR; INTRAVENOUS; SUBCUTANEOUS at 00:42

## 2025-04-22 RX ADMIN — PROMETHAZINE HYDROCHLORIDE 12.5 MG: 25 TABLET ORAL at 08:43

## 2025-04-22 RX ADMIN — CLONIDINE HYDROCHLORIDE 0.1 MG: 0.2 TABLET ORAL at 13:10

## 2025-04-22 RX ADMIN — DIPHENHYDRAMINE HYDROCHLORIDE 50 MG: 50 INJECTION, SOLUTION INTRAMUSCULAR; INTRAVENOUS at 13:09

## 2025-04-22 RX ADMIN — DIPHENHYDRAMINE HYDROCHLORIDE 50 MG: 50 INJECTION, SOLUTION INTRAMUSCULAR; INTRAVENOUS at 19:29

## 2025-04-22 RX ADMIN — DIPHENHYDRAMINE HYDROCHLORIDE 50 MG: 50 INJECTION, SOLUTION INTRAMUSCULAR; INTRAVENOUS at 06:44

## 2025-04-22 RX ADMIN — HYDROXYZINE HYDROCHLORIDE 25 MG: 25 TABLET, FILM COATED ORAL at 21:50

## 2025-04-22 RX ADMIN — DIPHENHYDRAMINE HYDROCHLORIDE 50 MG: 50 INJECTION, SOLUTION INTRAMUSCULAR; INTRAVENOUS at 09:57

## 2025-04-22 ASSESSMENT — COGNITIVE AND FUNCTIONAL STATUS - GENERAL
DAILY ACTIVITIY SCORE: 18
DAILY ACTIVITIY SCORE: 24
DRESSING REGULAR LOWER BODY CLOTHING: A LITTLE
STANDING UP FROM CHAIR USING ARMS: A LITTLE
PERSONAL GROOMING: A LITTLE
TOILETING: A LITTLE
MOBILITY SCORE: 24
HELP NEEDED FOR BATHING: A LITTLE
EATING MEALS: A LITTLE
MOBILITY SCORE: 21
DRESSING REGULAR UPPER BODY CLOTHING: A LITTLE
WALKING IN HOSPITAL ROOM: A LITTLE
CLIMB 3 TO 5 STEPS WITH RAILING: A LITTLE

## 2025-04-22 ASSESSMENT — PAIN DESCRIPTION - DESCRIPTORS
DESCRIPTORS: ACHING;DISCOMFORT

## 2025-04-22 ASSESSMENT — PAIN SCALES - GENERAL
PAINLEVEL_OUTOF10: 7
PAINLEVEL_OUTOF10: 10 - WORST POSSIBLE PAIN
PAINLEVEL_OUTOF10: 8
PAINLEVEL_OUTOF10: 10 - WORST POSSIBLE PAIN
PAINLEVEL_OUTOF10: 7
PAINLEVEL_OUTOF10: 6
PAINLEVEL_OUTOF10: 10 - WORST POSSIBLE PAIN
PAINLEVEL_OUTOF10: 7
PAINLEVEL_OUTOF10: 6
PAINLEVEL_OUTOF10: 4
PAINLEVEL_OUTOF10: 7
PAINLEVEL_OUTOF10: 10 - WORST POSSIBLE PAIN
PAINLEVEL_OUTOF10: 10 - WORST POSSIBLE PAIN

## 2025-04-22 ASSESSMENT — ENCOUNTER SYMPTOMS
FATIGUE: 1
NEUROLOGICAL NEGATIVE: 1
ACTIVITY CHANGE: 1
RESPIRATORY NEGATIVE: 1
EYES NEGATIVE: 1

## 2025-04-22 NOTE — PROGRESS NOTES
"Batsheva Page is a 49 y.o. female on day 5 of admission presenting with Right foot infection.    Subjective   Patient resting in bed. States pain manageable with current regimen. Nausea improved with phenergan.        Objective     Physical Exam  Constitutional:       Appearance: Normal appearance.   HENT:      Head: Normocephalic and atraumatic.      Mouth/Throat:      Mouth: Mucous membranes are moist.      Pharynx: Oropharynx is clear.   Eyes:      Extraocular Movements: Extraocular movements intact.      Conjunctiva/sclera: Conjunctivae normal.      Pupils: Pupils are equal, round, and reactive to light.   Cardiovascular:      Rate and Rhythm: Normal rate and regular rhythm.      Pulses: Normal pulses.      Heart sounds: Normal heart sounds.   Pulmonary:      Effort: Pulmonary effort is normal.      Breath sounds: Normal breath sounds.   Abdominal:      General: Bowel sounds are normal.      Palpations: Abdomen is soft.      Tenderness: There is no abdominal tenderness.   Musculoskeletal:         General: Normal range of motion.      Cervical back: Normal range of motion and neck supple.   Skin:     General: Skin is warm and dry.      Capillary Refill: Capillary refill takes less than 2 seconds.   Neurological:      General: No focal deficit present.      Mental Status: She is alert and oriented to person, place, and time.   Psychiatric:         Mood and Affect: Mood normal.         Behavior: Behavior normal.         Last Recorded Vitals  Blood pressure (!) 159/97, pulse 81, temperature 36.8 °C (98.2 °F), temperature source Oral, resp. rate 16, height 1.727 m (5' 8\"), weight 73.8 kg (162 lb 11.2 oz), SpO2 95%.  Intake/Output last 3 Shifts:  I/O last 3 completed shifts:  In: 1730 (23.4 mL/kg) [P.O.:680; I.V.:400 (5.4 mL/kg); Other:400; IV Piggyback:250]  Out: 1548 (21 mL/kg) [Other:1548]  Weight: 73.8 kg     Relevant Results  Results for orders placed or performed during the hospital encounter of 04/17/25 " (from the past 24 hours)   CBC   Result Value Ref Range    WBC 5.4 4.4 - 11.3 x10*3/uL    nRBC 0.0 0.0 - 0.0 /100 WBCs    RBC 3.26 (L) 4.00 - 5.20 x10*6/uL    Hemoglobin 10.0 (L) 12.0 - 16.0 g/dL    Hematocrit 31.4 (L) 36.0 - 46.0 %    MCV 96 80 - 100 fL    MCH 30.7 26.0 - 34.0 pg    MCHC 31.8 (L) 32.0 - 36.0 g/dL    RDW 16.5 (H) 11.5 - 14.5 %    Platelets 144 (L) 150 - 450 x10*3/uL   Renal Function Panel   Result Value Ref Range    Glucose 99 74 - 99 mg/dL    Sodium 137 136 - 145 mmol/L    Potassium 4.4 3.5 - 5.3 mmol/L    Chloride 95 (L) 98 - 107 mmol/L    Bicarbonate 29 21 - 32 mmol/L    Anion Gap 17 10 - 20 mmol/L    Urea Nitrogen 17 6 - 23 mg/dL    Creatinine 3.88 (H) 0.50 - 1.05 mg/dL    eGFR 14 (L) >60 mL/min/1.73m*2    Calcium 7.5 (L) 8.6 - 10.3 mg/dL    Phosphorus 4.8 2.5 - 4.9 mg/dL    Albumin 3.3 (L) 3.4 - 5.0 g/dL   Magnesium   Result Value Ref Range    Magnesium 1.84 1.60 - 2.40 mg/dL   Vancomycin   Result Value Ref Range    Vancomycin 31.8 (H) 5.0 - 20.0 ug/mL       This patient has a central line   Reason for the central line remaining today? Dialysis/Hemapheresis      Assessment & Plan  Hyperkalemia    Dialysis catheter site infection  Patient does have a history of bacteremia and endocarditis  Right groin dialysis catheter with erythema, edema, tenderness, drainage on admission   - Now, small nodule noted at site, but no redness, drainage from site noted   Blood cultures negative to date  Continue IV antibiotics - per ID recs   Follow cultures - blood cultures negative to date   Right groin dialysis catheter in place however nonfunctioning, will need removed  Temporary catheter has been placed in the left groin without signs or symptoms of infection currently.  This will need exchanged with permanent dialysis catheter prior to discharge.  - Anesthesia coverage available in IR on Thursday 4/24. Plan for procedure to remove and replace dialysis cath   ID recommending resuming vancomycin dwell after  "each dialysis session  Pain management    ESRD on HD  Right groin dialysis catheter infected and nonfunctioning, will need removed prior to discharge  Left femoral temporary HD catheter placed 4/17  Potassium 6.1 on arrival - stat dialysis done   - improved   Continue home Retacrit, Calcitrol  IR consulted for tunneled line exchange  Nephrology consulted  Receives Benadryl prior to HD for itching, continue    HTN  Continue home metoprolol and clonidine  Monitor blood pressure close    Coronary artery disease  Continue aspirin and statin    Seizure  Seizure precautions  Continue Keppra     Anxiety/depression  Psych consulted, appreciate recs  Remeron and Atarax ordered    Anemia secondary to chronic renal disease  Continue home Retacrit  Transfuse for Hgb > 7  Stable     PLAN:  DVT prophylaxis: SCDs, patient had declined pharmacologic prophylaxis  Renal diet  Antibiotics  Follow cultures  IR consulted for tunneled line exchange  Plan for NPO after midnight 4/24  CBC, PT, and BMP in the morning  Monitor on telemetry  Hemodialysis per nephrology    Please continue current pain regimen: Dilaudid 0.6mg Q 3 hours, Benadryl 50mg IV Q 3 hours. May be given together. She has been tolerating and this is the regimen that has worked for her on previous admissions.     CODE STATUS: FULL CODE    After visit, patient became nauseated shortly after taking morning pills. When I went back in the room, she states she wants to stop dialysis. Feels like the recurrent infections and frequent hospital stays are \"killing me.\" She agrees to palliative medicine consult to discuss improved symptom control and establish goals of care.         Vania Escalante, APRN-CNP    "

## 2025-04-22 NOTE — PROGRESS NOTES
BRIEF  ID  CHART  REVIEW  NOTE      Chart reviewed  Vital signs stable  Surjit on empiric vancomycin and Zosyn awaiting removal of right femoral tunneled dialysis catheter on 4/24  Will reassess 4/23, please call sooner prn    Adama Soto MD  [facility]  Office:  [phone]

## 2025-04-22 NOTE — NURSING NOTE
Dressing to right groin dialysis catheter is barely attached, dressing removed, new sterile dressing placed

## 2025-04-22 NOTE — ASSESSMENT & PLAN NOTE
Dialysis catheter site infection  Patient does have a history of bacteremia and endocarditis  Right groin dialysis catheter with erythema, edema, tenderness, drainage on admission   - Now, small nodule noted at site, but no redness, drainage from site noted   Blood cultures negative to date  Continue IV antibiotics - per ID recs   Follow cultures - blood cultures negative to date   Right groin dialysis catheter in place however nonfunctioning, will need removed  Temporary catheter has been placed in the left groin without signs or symptoms of infection currently.  This will need exchanged with permanent dialysis catheter prior to discharge.  - Anesthesia coverage available in IR on Thursday 4/24. Plan for procedure to remove and replace dialysis cath   ID recommending resuming vancomycin dwell after each dialysis session  Pain management    ESRD on HD  Right groin dialysis catheter infected and nonfunctioning, will need removed prior to discharge  Left femoral temporary HD catheter placed 4/17  Potassium 6.1 on arrival - stat dialysis done   - improved   Continue home Retacrit, Calcitrol  IR consulted for tunneled line exchange  Nephrology consulted  Receives Benadryl prior to HD for itching, continue    HTN  Continue home metoprolol and clonidine  Monitor blood pressure close    Coronary artery disease  Continue aspirin and statin    Seizure  Seizure precautions  Continue Keppra     Anxiety/depression  Psych consulted, appreciate recs  Remeron and Atarax ordered    Anemia secondary to chronic renal disease  Continue home Retacrit  Transfuse for Hgb > 7  Stable     PLAN:  DVT prophylaxis: SCDs, patient had declined pharmacologic prophylaxis  Renal diet  Antibiotics  Follow cultures  IR consulted for tunneled line exchange  Plan for NPO after midnight 4/24  CBC, PT, and BMP in the morning  Monitor on telemetry  Hemodialysis per nephrology    Please continue current pain regimen: Dilaudid 0.6mg Q 3 hours, Benadryl  "50mg IV Q 3 hours. May be given together. She has been tolerating and this is the regimen that has worked for her on previous admissions.     CODE STATUS: FULL CODE    After visit, patient became nauseated shortly after taking morning pills. When I went back in the room, she states she wants to stop dialysis. Feels like the recurrent infections and frequent hospital stays are \"killing me.\" She agrees to palliative medicine consult to discuss improved symptom control and establish goals of care.     "

## 2025-04-22 NOTE — PROGRESS NOTES
"Batsheva Page is a 49 y.o. female on day 5 of admission presenting with Right foot infection.    Subjective   Patient is seen for end-stage kidney disease and hyperkalemia admitted with malfunctioning right femoral permacath patient is continuing to have nausea Phenergan no fever or chills patient is being dialyzed through a temporary left femoral dialysis catheter at this point and scheduled for replacement of her permacath on Thursday       Objective     Physical Exam  Constitutional:       General: She is not in acute distress.     Appearance: Normal appearance. She is not toxic-appearing.   Neck:      Vascular: No carotid bruit.   Cardiovascular:      Heart sounds: No murmur heard.     No friction rub. No gallop.   Pulmonary:      Breath sounds: No wheezing, rhonchi or rales.   Abdominal:      Tenderness: There is no abdominal tenderness. There is no right CVA tenderness, left CVA tenderness or rebound.   Musculoskeletal:         General: No tenderness.      Cervical back: Neck supple.      Right lower leg: No edema.      Left lower leg: No edema.   Lymphadenopathy:      Cervical: No cervical adenopathy.         Last Recorded Vitals  Blood pressure (!) 159/97, pulse 81, temperature 36.8 °C (98.2 °F), temperature source Oral, resp. rate 16, height 1.727 m (5' 8\"), weight 73.8 kg (162 lb 11.2 oz), SpO2 95%.    Intake/Output last 3 Shifts:  I/O last 3 completed shifts:  In: 1730 (23.4 mL/kg) [P.O.:680; I.V.:400 (5.4 mL/kg); Other:400; IV Piggyback:250]  Out: 1548 (21 mL/kg) [Other:1548]  Weight: 73.8 kg   Current Medications[1]   Relevant Results    Results for orders placed or performed during the hospital encounter of 04/17/25 (from the past 96 hours)   CBC   Result Value Ref Range    WBC 4.9 4.4 - 11.3 x10*3/uL    nRBC 0.0 0.0 - 0.0 /100 WBCs    RBC 3.17 (L) 4.00 - 5.20 x10*6/uL    Hemoglobin 9.7 (L) 12.0 - 16.0 g/dL    Hematocrit 31.5 (L) 36.0 - 46.0 %    MCV 99 80 - 100 fL    MCH 30.6 26.0 - 34.0 pg    " MCHC 30.8 (L) 32.0 - 36.0 g/dL    RDW 16.7 (H) 11.5 - 14.5 %    Platelets 129 (L) 150 - 450 x10*3/uL   Renal Function Panel   Result Value Ref Range    Glucose 84 74 - 99 mg/dL    Sodium 135 (L) 136 - 145 mmol/L    Potassium 6.1 (HH) 3.5 - 5.3 mmol/L    Chloride 97 (L) 98 - 107 mmol/L    Bicarbonate 26 21 - 32 mmol/L    Anion Gap 18 10 - 20 mmol/L    Urea Nitrogen 26 (H) 6 - 23 mg/dL    Creatinine 4.72 (H) 0.50 - 1.05 mg/dL    eGFR 11 (L) >60 mL/min/1.73m*2    Calcium 6.8 (L) 8.6 - 10.3 mg/dL    Phosphorus 5.2 (H) 2.5 - 4.9 mg/dL    Albumin 3.1 (L) 3.4 - 5.0 g/dL   Magnesium   Result Value Ref Range    Magnesium 1.73 1.60 - 2.40 mg/dL   CBC   Result Value Ref Range    WBC 5.0 4.4 - 11.3 x10*3/uL    nRBC 0.0 0.0 - 0.0 /100 WBCs    RBC 3.12 (L) 4.00 - 5.20 x10*6/uL    Hemoglobin 9.5 (L) 12.0 - 16.0 g/dL    Hematocrit 31.0 (L) 36.0 - 46.0 %    MCV 99 80 - 100 fL    MCH 30.4 26.0 - 34.0 pg    MCHC 30.6 (L) 32.0 - 36.0 g/dL    RDW 16.8 (H) 11.5 - 14.5 %    Platelets 135 (L) 150 - 450 x10*3/uL   Renal Function Panel   Result Value Ref Range    Glucose 113 (H) 74 - 99 mg/dL    Sodium 136 136 - 145 mmol/L    Potassium 5.0 3.5 - 5.3 mmol/L    Chloride 98 98 - 107 mmol/L    Bicarbonate 27 21 - 32 mmol/L    Anion Gap 16 10 - 20 mmol/L    Urea Nitrogen 22 6 - 23 mg/dL    Creatinine 3.90 (H) 0.50 - 1.05 mg/dL    eGFR 13 (L) >60 mL/min/1.73m*2    Calcium 7.0 (L) 8.6 - 10.3 mg/dL    Phosphorus 4.5 2.5 - 4.9 mg/dL    Albumin 3.1 (L) 3.4 - 5.0 g/dL   Magnesium   Result Value Ref Range    Magnesium 1.72 1.60 - 2.40 mg/dL   CBC   Result Value Ref Range    WBC 8.1 4.4 - 11.3 x10*3/uL    nRBC 0.0 0.0 - 0.0 /100 WBCs    RBC 3.30 (L) 4.00 - 5.20 x10*6/uL    Hemoglobin 10.1 (L) 12.0 - 16.0 g/dL    Hematocrit 32.4 (L) 36.0 - 46.0 %    MCV 98 80 - 100 fL    MCH 30.6 26.0 - 34.0 pg    MCHC 31.2 (L) 32.0 - 36.0 g/dL    RDW 16.7 (H) 11.5 - 14.5 %    Platelets 145 (L) 150 - 450 x10*3/uL   Renal Function Panel   Result Value Ref Range    Glucose  97 74 - 99 mg/dL    Sodium 136 136 - 145 mmol/L    Potassium 5.3 3.5 - 5.3 mmol/L    Chloride 97 (L) 98 - 107 mmol/L    Bicarbonate 24 21 - 32 mmol/L    Anion Gap 20 10 - 20 mmol/L    Urea Nitrogen 31 (H) 6 - 23 mg/dL    Creatinine 5.58 (H) 0.50 - 1.05 mg/dL    eGFR 9 (L) >60 mL/min/1.73m*2    Calcium 7.0 (L) 8.6 - 10.3 mg/dL    Phosphorus 5.5 (H) 2.5 - 4.9 mg/dL    Albumin 3.3 (L) 3.4 - 5.0 g/dL   Magnesium   Result Value Ref Range    Magnesium 1.74 1.60 - 2.40 mg/dL   Vancomycin   Result Value Ref Range    Vancomycin 42.6 (H) 5.0 - 20.0 ug/mL   Protime-INR   Result Value Ref Range    Protime 11.7 9.3 - 12.7 seconds    INR 1.1 0.9 - 1.2   CBC   Result Value Ref Range    WBC 5.4 4.4 - 11.3 x10*3/uL    nRBC 0.0 0.0 - 0.0 /100 WBCs    RBC 3.26 (L) 4.00 - 5.20 x10*6/uL    Hemoglobin 10.0 (L) 12.0 - 16.0 g/dL    Hematocrit 31.4 (L) 36.0 - 46.0 %    MCV 96 80 - 100 fL    MCH 30.7 26.0 - 34.0 pg    MCHC 31.8 (L) 32.0 - 36.0 g/dL    RDW 16.5 (H) 11.5 - 14.5 %    Platelets 144 (L) 150 - 450 x10*3/uL   Renal Function Panel   Result Value Ref Range    Glucose 99 74 - 99 mg/dL    Sodium 137 136 - 145 mmol/L    Potassium 4.4 3.5 - 5.3 mmol/L    Chloride 95 (L) 98 - 107 mmol/L    Bicarbonate 29 21 - 32 mmol/L    Anion Gap 17 10 - 20 mmol/L    Urea Nitrogen 17 6 - 23 mg/dL    Creatinine 3.88 (H) 0.50 - 1.05 mg/dL    eGFR 14 (L) >60 mL/min/1.73m*2    Calcium 7.5 (L) 8.6 - 10.3 mg/dL    Phosphorus 4.8 2.5 - 4.9 mg/dL    Albumin 3.3 (L) 3.4 - 5.0 g/dL   Magnesium   Result Value Ref Range    Magnesium 1.84 1.60 - 2.40 mg/dL   Vancomycin   Result Value Ref Range    Vancomycin 31.8 (H) 5.0 - 20.0 ug/mL         Assessment & Plan:     End-stage renal disease plan dialysis tomorrow morning and hopefully a new permacath on Thursday  Hyperkalemia solved  Infection of dialysis catheter site and malfunctioning dialysis catheter- IR consulted for tunneled dialysis catheter exchange    Zhou Pedersen MD         [1]   Current  Facility-Administered Medications:     acetaminophen (Tylenol) tablet 650 mg, 650 mg, oral, q6h PRN, ORI Kincaid-CNP    aspirin EC tablet 81 mg, 81 mg, oral, Daily, ORI Kincaid-CNP, 81 mg at 04/20/25 0814    atorvastatin (Lipitor) tablet 40 mg, 40 mg, oral, Daily, ORI Kincaid-CNP, 40 mg at 04/20/25 0814    calcitriol (Rocaltrol) capsule 0.5 mcg, 0.5 mcg, oral, Daily, ORI Kincaid-CNP, 0.5 mcg at 04/20/25 0814    cloNIDine (Catapres) tablet 0.1 mg, 0.1 mg, oral, q8h KIMBERLY, ORI Kincaid-CNP, 0.1 mg at 04/22/25 0644    diphenhydrAMINE (BENADryl) injection 50 mg, 50 mg, intravenous, q3h PRN, ORI Kincaid-CNP, 50 mg at 04/22/25 0957    epoetin brandy-epbx (Retacrit) injection 6,440 Units, 100 Units/kg, subcutaneous, Once per day on Monday Wednesday Friday, ANGELA Kincaid, 6,440 Units at 04/21/25 1842    ergocalciferol (Vitamin D-2) capsule 1.25 mg, 1.25 mg, oral, Every Sunday, ORI Kincaid-CNP, 1.25 mg at 04/20/25 0814    heparin 1,000 unit/mL injection 1,000 Units, 1,000 Units, intra-catheter, After Dialysis, ORI Cheatham-CNP    heparin 1,000 unit/mL injection 1,000 Units, 1,000 Units, intra-catheter, After Dialysis, ORI Cheatham-CNP, 1,200 Units at 04/21/25 1742    HYDROmorphone (Dilaudid) injection 0.6 mg, 0.6 mg, intravenous, q3h PRN, ORI Kincaid-CNP, 0.6 mg at 04/22/25 0957    hydrOXYzine HCL (Atarax) tablet 25 mg, 25 mg, oral, q6h PRN, ANGELA Michael, 25 mg at 04/22/25 0140    levETIRAcetam (Keppra) tablet 750 mg, 750 mg, oral, Nightly, ANGELA Kincaid, 750 mg at 04/18/25 2125    [Held by provider] methocarbamol (Robaxin) tablet 750 mg, 750 mg, oral, q6h PRN, Gloria Carrion PA-C    metoprolol tartrate (Lopressor) tablet 25 mg, 25 mg, oral, BID, ANGELA Kincaid, 25 mg at 04/22/25 0821    mirtazapine (Remeron) tablet 15 mg, 15 mg, oral, Nightly, ANGELA Michael, 15 mg at  04/21/25 2140    mupirocin (Bactroban) 2 % ointment, , Topical, TID, ANGELA Huynh, Given at 04/21/25 1801    oxyCODONE-acetaminophen (Percocet)  mg per tablet 1 tablet, 1 tablet, oral, q6h PRN, ANGELA Kincaid, 1 tablet at 04/18/25 0632    oxyCODONE-acetaminophen (Percocet) 5-325 mg per tablet 1 tablet, 1 tablet, oral, q6h PRN, ANGELA Kincaid    piperacillin-tazobactam (Zosyn) 2.25 g in dextrose (iso) IV 50 mL, 2.25 g, intravenous, q8h, ANGELA Kincaid, Stopped at 04/22/25 0729    pregabalin (Lyrica) capsule 50 mg, 50 mg, oral, BID, ANGELA Kincaid, 50 mg at 04/20/25 0814    promethazine (Phenergan) tablet 12.5 mg, 12.5 mg, oral, q6h PRN, ANGELA Huynh, 12.5 mg at 04/22/25 0843    sodium chloride 0.9 % bolus 200 mL, 200 mL, intravenous, q1h PRN, Vu Pedersen MD    vancomycin (Vancocin) pharmacy to dose - pharmacy monitoring, , miscellaneous, Daily PRN, ANGELA Kincaid

## 2025-04-22 NOTE — NURSING NOTE
Rounded on pt. Pt laying in bed, eyes closed, respirations even and unlabored. Pt appears to be sleeping and in no apparent pain or distress. Pt awakes spontaneously as I approach the bed. Pt is receptive to the benadryl and dilaudid that I available. I inform her I will have to return to give her vistaril as we are too early. Pt understands and is agreeable to plan. Pt requests another Sprite and peanut butter crackers which I provide after medicating her. Pt has no other needs or complaints at this time. Pt oriented to call bell and it and belongings are placed in reach. Telemetry leads connected and reading on monitor. Bed alarm set. Continuing to monitor.

## 2025-04-22 NOTE — CONSULTS
Inpatient consult to Palliative Care  Consult performed by: ANGELA Vargas  Consult ordered by: ANGELA Huynh  Reason for consult: GOC and discussion regarding continued hemodialysis      History Of Present Illness  Batsheva Page is 49 years old presenting to [facility] ED, 4/17/25 with concern that her dialysis catheter might be infected.  She complained of pain at site (R femoral groin), with redness and drainage.  Her dialysis schedule at [facility] is MWF.    Past medical history significant for ESRD on hemodialysis (for 20 years), recurrent MSSA/MRSA bacteremia, endocarditis, s/p aortic valve replacement and mitral valve replacement, HTN, HLD, seizure disorder, anemia, hyponatremia, chronic pain syndrome with RLS.     ED Course 4/17/2024:  On arrival to ED patient was afebrile T36.5.  Normotensive.  Normal sinus rhythm.  Oxygen saturation 100% on room air.  CBC unremarkable, no significant leukocytosis.  Chemistry showed elevated creatinine near baseline at 6.95, BUN 41.  Significant hyperkalemia with potassium 8.2.  Lactate normal.  Hyperkalemia was treated with insulin, dextrose, calcium and improved to 7.6.  Patient with right femoral tunneled dialysis catheter, did have redness and drainage at the site concerning for site infection therefore no urgent dialysis was initiated through this catheter.  She was given vancomycin and Zosyn due to concern for infection.  Patient was admitted to the ICU for new temporary dialysis catheter placement and emergent dialysis.    Patient is now on fourth floor, awaiting replacement HD catheter and removal of infected catheter (to undergo with anesthesia) - tentative plan is Thursday, 4/24/2025.  Review of labs today - Creatinine 3.88, BUN 17, eGFR 14, Albumin 3.3, Calcium 7.5.  Hemoglobin 10.0/Hematocrit 31.4.  Blood cultures negative.    Patient lives with her boyfriend, Chava Saleem (of 20 years) and daughter, Joy (who is 29 y/o).  She  "is on disability, independent in ADLs at home.  Family provides transportation to and from dialysis. Patient's mother  suddenly in 2024 of massive MI (at the time patient was hospitalized at [facility], [place] -unable to see her mother at [facility], \"unable to say goodbye\").  This was a significant loss for patient as her mom was her primary support during frequent hospital stays.    Outpatient subspecialty providers include:   Dr. Zhou Pedersen, Nephrologist; [facility] Neurologist, Dr. Jarad Porter, [facility] cardiologist Dr. Robby Staples.  No PCP.     Symptoms (0 - 10, Best to Worst)  [place] Symptom Assessment System  0-10 (Numeric) Pain Score: 10 - Worst possible pain  Patient reports significant nausea, with 1 episode of emesis.  Was unable to hold down oral Phenergan.  One-time IV dose of Phenergan ordered.  Overall patient reports feeling depressed, worn down by repeated hospitalizations (with bacteremias) and ongoing ESRD.  Restless leg syndrome pain management while hospitalized is a combination of IV Benadryl/IV hydromorphone (and remains effective).  Updates provided and reviewed with bedside RN, [place] RO in last 48 hours? unknown    Emotional/Psychological/Spiritual Needs  Over the past two weeks, how often has the patient been bothered by having little interest or pleasure in doing things?  occurrence (last two weeks): every day    Over the past two weeks, how often has the patient been bothered by feeling down, depressed, or hopeless?  occurrence (last two weeks): every day    Screening for spiritual needs?  Yes  Orthodoxy and importance of Lutheran: No current practice  Source for spiritual support/meaning: Pt. feeling worn down by significant serious illness    Spiritual Hx:  Are you spiritual or Christianity? Not at this time  What’s your lidya background? Restorationism lidya  During difficult times in your life, what have you relied on for strength?  Relies on family, at this time " "boyfriend, Chava and daughterJoy        Serious Illness Conversation  What is your understanding now of where you are with your illness: Patient has keen awareness of her serious illness (ESRD -having been on hemodialysis for 21 years -since age 29) with multiple setbacks -bacteremia and valvular disease  How much information about what is likely to be ahead with your illness  would you like from me: Patient wants full disclosure and is a capable decision maker  What are your most important goals if your health situation worsens: Patient does not want to suffer wound to her significant pain/symptom burden.  She would like to have some decision making control and discontinuing dialysis  What are your biggest fears and worries about the future with your health: Suffering, fear for her family being left without her  What gives you strength as you think about the future with your illness: Chava, boyfriend of 20 years and daughterJoy (age 28) provide support.  Patient suffered a significant loss -mother  of sudden MI in 2024 (patient at that time was hospitalized and could not go to the hospital ([facility]) where her mother was to \"say goodbye\"  What abilities are so critical to your life that you can’t imagine living without them: Does not want to be on long-term life support, does not want Trach or PEG, he is open to full code resuscitation measures at this time  If you become sicker, how much are you willing to go through for the possibility of gaining more time: As above  How much does your family know about your priorities and wishes: Initial conversation, per patient, needs to spend more time communicating her wishes to family    Personal/Social History  She reports that she has never smoked. She has never used smokeless tobacco. She reports that she does not drink alcohol and does not use drugs.    Functional Status  Activities of Daily Living:  Basic ADLs: (I= independent, A= " assistance, D= dependent)  Bathing: I, Dressing: I, Toileting: I, Transferring: I, Continence: I, Feeding: I    T[place] Index:  6      Caregiving/Caregiver Support  Does the patient require assistance in some or all components of his care, including coordination of medical care? Yes  If Yes, which person serves that role?  partner   Caregiver emotional or practical needs:      Past Medical History  Diagnosis Date   Aortic valve replaced    CHF (congestive heart failure)     Chronic pain     Coronary artery disease     Disease of thyroid gland     ESRD (end stage renal disease) (Multi)     H/O mitral valve replacement    Heart disease     History of transfusion     Hypertension     Mitral valve regurgitation     Seizures (Multi)     Stroke (Multi)      Surgical History  Procedure  Date   AORTIC VALVE REPLACEMENT   2022   bioprosthetic   CORONARY ARTERY BYPASS GRAFT       IR CVC   2024   exchange   IR CVC   2024   exchange   IR CVC   2024   removal   IR CVC TUNNELED   2022   IR CVC TUNNELED 2022 [facility]GAC[facility]   IR CVC TUNNELED   2022   IR CVC TUNNELED 2022 [facility] INPATIENT [facility]GAC[facility]   MITRAL VALVE REPAIR   2013   MITRAL VALVE REPLACEMENT   2022   bioprosthetic   PARATHYROIDECTOMY       TRICUSPID VALVULOPLASTY   2013   US GUIDED PERCUTANEOUS PLACEMENT   2022   US GUIDED PERCUTANEOUS PLACEMENT [facility]     Family History  Mother -  (Acute MI )    Allergies  Kayexalate, Metoclopramide hcl, Prochlorperazine, Zofran [ondansetron hcl], and Ondansetron    Review of Systems   Constitutional:  Positive for activity change and fatigue.   HENT: Negative.     Eyes: Negative.    Respiratory: Negative.     Cardiovascular:         Recent history of endocarditis, underwent MV and AV replacement surgeries   Musculoskeletal:         Chronic restless leg syndrome   Skin:  Positive for pallor.   Neurological: Negative.    Psychiatric/Behavioral:    "       Reports depression (situational)        Physical Exam  Vitals and nursing note reviewed.   Constitutional:       Appearance: She is ill-appearing.   HENT:      Head: Normocephalic and atraumatic.      Nose: Nose normal.      Mouth/Throat:      Mouth: Mucous membranes are moist.      Pharynx: Oropharynx is clear.   Eyes:      Extraocular Movements: Extraocular movements intact.      Conjunctiva/sclera: Conjunctivae normal.      Pupils: Pupils are equal, round, and reactive to light.   Cardiovascular:      Rate and Rhythm: Normal rate and regular rhythm.      Pulses: Normal pulses.      Heart sounds: Normal heart sounds.      Comments: Right groin CVC HD catheter (to be removed due to infection), temporary Left groin CVC HD catheter  Pulmonary:      Effort: Pulmonary effort is normal.      Breath sounds: Normal breath sounds.      Comments: Lung sounds diminished bilaterally in bases  Abdominal:      General: Abdomen is flat. Bowel sounds are normal.      Palpations: Abdomen is soft.   Genitourinary:     Comments: Anuric  Musculoskeletal:         General: Normal range of motion.      Cervical back: Neck supple.   Skin:     General: Skin is warm and dry.   Neurological:      General: No focal deficit present.      Mental Status: She is oriented to person, place, and time.      Motor: Weakness present.   Psychiatric:         Mood and Affect: Mood normal.         Behavior: Behavior normal.         Thought Content: Thought content normal.         Judgment: Judgment normal.      Comments: Reports depressed mood         Last Recorded Vitals  Blood pressure (!) 182/88, pulse 71, temperature 36.9 °C (98.4 °F), temperature source Oral, resp. rate 18, height 1.727 m (5' 8\"), weight 73.8 kg (162 lb 11.2 oz), SpO2 96%.    Relevant Results  Results for orders placed or performed during the hospital encounter of 04/17/25 (from the past 24 hours)   CBC   Result Value Ref Range    WBC 5.4 4.4 - 11.3 x10*3/uL    nRBC 0.0 0.0 - " "0.0 /100 WBCs    RBC 3.26 (L) 4.00 - 5.20 x10*6/uL    Hemoglobin 10.0 (L) 12.0 - 16.0 g/dL    Hematocrit 31.4 (L) 36.0 - 46.0 %    MCV 96 80 - 100 fL    MCH 30.7 26.0 - 34.0 pg    MCHC 31.8 (L) 32.0 - 36.0 g/dL    RDW 16.5 (H) 11.5 - 14.5 %    Platelets 144 (L) 150 - 450 x10*3/uL   Renal Function Panel   Result Value Ref Range    Glucose 99 74 - 99 mg/dL    Sodium 137 136 - 145 mmol/L    Potassium 4.4 3.5 - 5.3 mmol/L    Chloride 95 (L) 98 - 107 mmol/L    Bicarbonate 29 21 - 32 mmol/L    Anion Gap 17 10 - 20 mmol/L    Urea Nitrogen 17 6 - 23 mg/dL    Creatinine 3.88 (H) 0.50 - 1.05 mg/dL    eGFR 14 (L) >60 mL/min/1.73m*2    Calcium 7.5 (L) 8.6 - 10.3 mg/dL    Phosphorus 4.8 2.5 - 4.9 mg/dL    Albumin 3.3 (L) 3.4 - 5.0 g/dL   Magnesium   Result Value Ref Range    Magnesium 1.84 1.60 - 2.40 mg/dL   Vancomycin   Result Value Ref Range    Vancomycin 31.8 (H) 5.0 - 20.0 ug/mL      ECG 12 Lead    Height: 1.651 m (5' 5\")   Weight: 67 kg (147 lb 11.3 oz)   Blood Pressure: Not recorded    Date of Study: 4/17/25   Ordering Provider: Dean Maurer MD    Clinical Indications: Hyperkalemia       Reading Physicians  Performing Staff   Cardiology: Sade Solomon MD     No performing staff assigned to study.         Indications  Priority: STAT  Hyperkalemia     Interpretation Summary  Normal sinus rhythm  When compared with ECG of 14-DEC-2024 09:53,  Anterior infarct is now Present  T wave amplitude has increased in Lateral leads  Confirmed by Sade Solomon ([id number]) on 4/18/2025    Scheduled medications  Scheduled Medications[1]  Continuous medications  Continuous Medications[2]  PRN medications  PRN Medications[3]     Assessment/Plan   R femoral dialysis catheter infection - Non-working dialysis catheter.  Patient has had a significant history of infected dialysis catheters, with multiple episodes of high-grade MRSA bacteria.  Most recently, patient treated with 5 mg/ml vancomycin \"dwell\" after each dialysis session.  Patient " "proactive in seeking ED evaluation (4/17/25), blood cultures negative  ESRD on HD -  Dr. Zhou Pedersen, Nephrologist - patient has been on HD since 1995 (20 years), at one point was on renal transplant list (\"unable to move forward due to multiple complications\").  Patient acknowledges that she is having a more difficult time with HD and wants to be able to make a \"decision regarding the right time to discontinue HD\"(likely within the next 3 to 6 months)  Coronary artery disease - [facility] cardiologist Dr. Robby Staples, Mitral valve ring with tricuspid valvuloplasty in 2013. No CABG. Redo median sternotomy for AVR and MVR bio-prostheic valve Fall 2022.  Developed endocarditis in 2022.  Patient has been advised to undergo an additional MV and AV replacement -but she has declined this.  12/2024 LAURA LVEF 65-70% w/normal systolic function is normal.St.Devonte bioprosthetic type mitral valve prosthesis, status post tricuspid valve repair, Inspirus bioprosthetic type aortic valve.  History of seizures-on Keppra 750 mg hs, seizure precautions.  No seizures during this hospital stay to date, 4/22.  Restless leg syndrome - [facility] Neurologist, Dr. Jarad Porter manages.  Home regimen: Percocet 5 mg/375 mg as needed; Inpatient regimen: IV Benadryl and Dilaudid.  Patient is also on Lyrica 50 mg twice daily for RLS/Omalley Ekbom syndrome/Chronic pain syndrome.  Dr. Porter has recommended consideration of methadone (outpatient), patient hesitant to begin methadone  Depression/Anxiety -self disclosed worsening depression related to poor physical function; recent loss of mother (11/24), who was a source of support, especially during hospital stays.   Patient has been receptive to psychiatry during this admission (Sterling REHMAN-CNP).  Remeron increased to 15 mg nightly (to help with mood/depression, sleep and appetite)  Palliative Care - GOC and discussion regarding patient's decision -discontinue HD    CODE STATUS -FULL " CODE  Capable decision maker  No Advance Directives -does want to put these in place during this hospital stay  Daughter - Joy and Boyfriend/Partner - Chava are primary support  Currently in a disease modifying model of treatment    Discussed with patient her initial decision making process to discontinue HD based on repeated bacteremias, complex and prolonged hospital stays,open heart surgeries (which now includes a recommendation to replace MV and AV - history of endocarditis), and more difficulty undergoing HD.  She has had discussions with both her daughter, Joy and partner, Chava.  Discussed end-of-life considerations if patient discontinues HD, including access to hospice level of care.  Batsheva (pt.) wants to put Advance Directives in place.  [facility] provided to patient.  Recommended home-based palliative care as an additional layer of support.  Reviewed both [facility] and [facility] palliative care programs.  Patient will give this some consideration and provide her decision in the next several days.     Symptom Management  Pain: Present -restless leg syndrome  Medications recommended for pain?  Yes  Tiredness: Severe  Nausea: Severe  Depression: Moderate  Anxiety: Moderate  Drowsiness: Moderate  Appetite: Fair  Wellbeing: Poor  Dyspnea: Denies    I spent 110 non-continuous minutes in the professional and overall care of this patient, including 20 minutes related to ACP.      Loree Klein, ORI-CNP       [1] aspirin, 81 mg, oral, Daily  atorvastatin, 40 mg, oral, Daily  calcitriol, 0.5 mcg, oral, Daily  cloNIDine, 0.1 mg, oral, q8h KIMBERLY  epoetin brandy-epbx, 100 Units/kg, subcutaneous, Once per day on Monday Wednesday Friday  ergocalciferol, 1.25 mg, oral, Every Sunday  heparin, 1,000 Units, intra-catheter, After Dialysis  heparin, 1,000 Units, intra-catheter, After Dialysis  levETIRAcetam, 750 mg, oral, Nightly  metoprolol tartrate, 25 mg, oral,  BID  mirtazapine, 15 mg, oral, Nightly  mupirocin, , Topical, TID  piperacillin-tazobactam, 2.25 g, intravenous, q8h  pregabalin, 50 mg, oral, BID  [2]    [3] PRN medications: acetaminophen, diphenhydrAMINE, HYDROmorphone, hydrOXYzine HCL, [Held by provider] methocarbamol, oxyCODONE-acetaminophen, oxyCODONE-acetaminophen, promethazine, sodium chloride, vancomycin

## 2025-04-22 NOTE — PROCEDURES
Vascular Access Team Procedure Note     Visit Date: 4/22/2025      Patient Name: Batsheva Page         MRN: [id number]             Procedure: Pt has a L femoral Mahurkar, drsg dry and intact dated 4/21, there is a moderate amt of dried bloody drainage under drsg, no active bleeding noted, will change drsg. Drsg has been changed using sterile technique, site without any redness, swelling or drainage, blue cap changed on pigtail, brisk blood return noted and flushes easily with NS, clamped and curos cap applied.                           Yesenia Damico, RN  4/22/2025  12:11 PM

## 2025-04-22 NOTE — NURSING NOTE
Took over care of pt at this time. Pt laying in bed, alert, aox4. Pt acknowledge chronic pain at baseline with pain worse in right leg during this admission. Pt slightly improved with last pain intervention. Pt has no other needs or complaints at this time. Pt oriented to call bell and it and belongings are placed in reach. Telemetry leads connected and reading on monitor. Bed alarm set. Continuing to monitor.

## 2025-04-22 NOTE — NURSING NOTE
Went and picked up patient from dialysis d/t lack of transport, medicated with pain medication and benadryl, as requested, when pt was back in room, ordered dinner, comfortable at this time

## 2025-04-22 NOTE — NURSING NOTE
Am rounds, assumed care.  Blood pressure of 198/87 (taken at 0519 this am) noted, pt received scheduled clonidine at 0644 this am, denies headache

## 2025-04-22 NOTE — NURSING NOTE
Rounded on pt. Pt laying in bed, eyes closed, respirations even and unlabored. Pt appears to be sleeping and in no apparent pain or distress. Pt awakes spontaneously as I approach the bed. Pt is receptive to the benadryl and dilaudid that I available. Pt has no other needs or complaints at this time. Besides regular pain intervention, uneventful night with no changes since prior assessment. Pt oriented to call bell and it and belongings are placed in reach. Telemetry leads connected and reading on monitor. Bed alarm set. Continuing to monitor.

## 2025-04-23 ENCOUNTER — APPOINTMENT (OUTPATIENT)
Dept: DIALYSIS | Facility: HOSPITAL | Age: 50
End: 2025-04-23
Payer: MEDICARE

## 2025-04-23 LAB
ALBUMIN SERPL BCP-MCNC: 3.2 G/DL (ref 3.4–5)
ANION GAP SERPL CALCULATED.3IONS-SCNC: 19 MMOL/L (ref 10–20)
B-HCG SERPL-ACNC: 2 MIU/ML
BUN SERPL-MCNC: 29 MG/DL (ref 6–23)
CALCIUM SERPL-MCNC: 6.9 MG/DL (ref 8.6–10.3)
CHLORIDE SERPL-SCNC: 96 MMOL/L (ref 98–107)
CO2 SERPL-SCNC: 27 MMOL/L (ref 21–32)
CREAT SERPL-MCNC: 5.76 MG/DL (ref 0.5–1.05)
EGFRCR SERPLBLD CKD-EPI 2021: 8 ML/MIN/1.73M*2
ERYTHROCYTE [DISTWIDTH] IN BLOOD BY AUTOMATED COUNT: 16.6 % (ref 11.5–14.5)
GLUCOSE SERPL-MCNC: 89 MG/DL (ref 74–99)
HCT VFR BLD AUTO: 29.6 % (ref 36–46)
HGB BLD-MCNC: 9.5 G/DL (ref 12–16)
MAGNESIUM SERPL-MCNC: 1.94 MG/DL (ref 1.6–2.4)
MCH RBC QN AUTO: 31 PG (ref 26–34)
MCHC RBC AUTO-ENTMCNC: 32.1 G/DL (ref 32–36)
MCV RBC AUTO: 97 FL (ref 80–100)
NRBC BLD-RTO: 0 /100 WBCS (ref 0–0)
PHOSPHATE SERPL-MCNC: 5.8 MG/DL (ref 2.5–4.9)
PLATELET # BLD AUTO: 163 X10*3/UL (ref 150–450)
POTASSIUM SERPL-SCNC: 4.5 MMOL/L (ref 3.5–5.3)
RBC # BLD AUTO: 3.06 X10*6/UL (ref 4–5.2)
SODIUM SERPL-SCNC: 137 MMOL/L (ref 136–145)
VANCOMYCIN SERPL-MCNC: 28.6 UG/ML (ref 5–20)
WBC # BLD AUTO: 6.1 X10*3/UL (ref 4.4–11.3)

## 2025-04-23 PROCEDURE — 84100 ASSAY OF PHOSPHORUS: CPT | Performed by: NURSE PRACTITIONER

## 2025-04-23 PROCEDURE — 8010000001 HC DIALYSIS - HEMODIALYSIS PER DAY

## 2025-04-23 PROCEDURE — 2500000004 HC RX 250 GENERAL PHARMACY W/ HCPCS (ALT 636 FOR OP/ED)

## 2025-04-23 PROCEDURE — 2500000002 HC RX 250 W HCPCS SELF ADMINISTERED DRUGS (ALT 637 FOR MEDICARE OP, ALT 636 FOR OP/ED): Performed by: NURSE PRACTITIONER

## 2025-04-23 PROCEDURE — 2500000004 HC RX 250 GENERAL PHARMACY W/ HCPCS (ALT 636 FOR OP/ED): Mod: JZ | Performed by: NURSE PRACTITIONER

## 2025-04-23 PROCEDURE — 2500000001 HC RX 250 WO HCPCS SELF ADMINISTERED DRUGS (ALT 637 FOR MEDICARE OP): Performed by: NURSE PRACTITIONER

## 2025-04-23 PROCEDURE — 84702 CHORIONIC GONADOTROPIN TEST: CPT | Performed by: NURSE PRACTITIONER

## 2025-04-23 PROCEDURE — 2060000001 HC INTERMEDIATE ICU ROOM DAILY

## 2025-04-23 PROCEDURE — 6350000001 HC RX 635 EPOETIN >10,000 UNITS: Performed by: NURSE PRACTITIONER

## 2025-04-23 PROCEDURE — 99232 SBSQ HOSP IP/OBS MODERATE 35: CPT | Performed by: NURSE PRACTITIONER

## 2025-04-23 PROCEDURE — 2500000004 HC RX 250 GENERAL PHARMACY W/ HCPCS (ALT 636 FOR OP/ED): Performed by: NURSE PRACTITIONER

## 2025-04-23 PROCEDURE — 99231 SBSQ HOSP IP/OBS SF/LOW 25: CPT

## 2025-04-23 PROCEDURE — 83735 ASSAY OF MAGNESIUM: CPT | Performed by: NURSE PRACTITIONER

## 2025-04-23 PROCEDURE — 2500000001 HC RX 250 WO HCPCS SELF ADMINISTERED DRUGS (ALT 637 FOR MEDICARE OP)

## 2025-04-23 PROCEDURE — 80202 ASSAY OF VANCOMYCIN: CPT | Performed by: INTERNAL MEDICINE

## 2025-04-23 PROCEDURE — 2500000001 HC RX 250 WO HCPCS SELF ADMINISTERED DRUGS (ALT 637 FOR MEDICARE OP): Performed by: INTERNAL MEDICINE

## 2025-04-23 PROCEDURE — 85027 COMPLETE CBC AUTOMATED: CPT | Performed by: NURSE PRACTITIONER

## 2025-04-23 PROCEDURE — 99232 SBSQ HOSP IP/OBS MODERATE 35: CPT

## 2025-04-23 RX ORDER — DIPHENHYDRAMINE HYDROCHLORIDE 50 MG/ML
50 INJECTION, SOLUTION INTRAMUSCULAR; INTRAVENOUS ONCE
Status: COMPLETED | OUTPATIENT
Start: 2025-04-23 | End: 2025-04-23

## 2025-04-23 RX ORDER — HYDROMORPHONE HYDROCHLORIDE 1 MG/ML
0.6 INJECTION, SOLUTION INTRAMUSCULAR; INTRAVENOUS; SUBCUTANEOUS ONCE
Status: COMPLETED | OUTPATIENT
Start: 2025-04-23 | End: 2025-04-23

## 2025-04-23 RX ADMIN — HYDROMORPHONE HYDROCHLORIDE 0.6 MG: 1 INJECTION, SOLUTION INTRAMUSCULAR; INTRAVENOUS; SUBCUTANEOUS at 01:42

## 2025-04-23 RX ADMIN — HYDROMORPHONE HYDROCHLORIDE 0.6 MG: 1 INJECTION, SOLUTION INTRAMUSCULAR; INTRAVENOUS; SUBCUTANEOUS at 23:43

## 2025-04-23 RX ADMIN — DIPHENHYDRAMINE HYDROCHLORIDE 50 MG: 50 INJECTION, SOLUTION INTRAMUSCULAR; INTRAVENOUS at 19:50

## 2025-04-23 RX ADMIN — DIPHENHYDRAMINE HYDROCHLORIDE 50 MG: 50 INJECTION, SOLUTION INTRAMUSCULAR; INTRAVENOUS at 12:53

## 2025-04-23 RX ADMIN — CLONIDINE HYDROCHLORIDE 0.2 MG: 0.2 TABLET ORAL at 06:37

## 2025-04-23 RX ADMIN — HYDROXYZINE HYDROCHLORIDE 25 MG: 25 TABLET, FILM COATED ORAL at 04:24

## 2025-04-23 RX ADMIN — PROMETHAZINE HYDROCHLORIDE 12.5 MG: 25 TABLET ORAL at 20:01

## 2025-04-23 RX ADMIN — DIPHENHYDRAMINE HYDROCHLORIDE 50 MG: 50 INJECTION, SOLUTION INTRAMUSCULAR; INTRAVENOUS at 16:18

## 2025-04-23 RX ADMIN — MUPIROCIN: 20 OINTMENT TOPICAL at 20:00

## 2025-04-23 RX ADMIN — HEPARIN SODIUM 1000 UNITS: 1000 INJECTION INTRAVENOUS; SUBCUTANEOUS at 16:00

## 2025-04-23 RX ADMIN — DIPHENHYDRAMINE HYDROCHLORIDE 50 MG: 50 INJECTION, SOLUTION INTRAMUSCULAR; INTRAVENOUS at 23:43

## 2025-04-23 RX ADMIN — DIPHENHYDRAMINE HYDROCHLORIDE 50 MG: 50 INJECTION, SOLUTION INTRAMUSCULAR; INTRAVENOUS at 07:58

## 2025-04-23 RX ADMIN — HYDRALAZINE HYDROCHLORIDE 5 MG: 20 INJECTION INTRAMUSCULAR; INTRAVENOUS at 16:18

## 2025-04-23 RX ADMIN — PREGABALIN 50 MG: 50 CAPSULE ORAL at 20:01

## 2025-04-23 RX ADMIN — PROMETHAZINE HYDROCHLORIDE 12.5 MG: 25 TABLET ORAL at 00:27

## 2025-04-23 RX ADMIN — EPOETIN ALFA-EPBX 6440 UNITS: 20000 INJECTION, SOLUTION INTRAVENOUS; SUBCUTANEOUS at 18:03

## 2025-04-23 RX ADMIN — METOPROLOL TARTRATE 25 MG: 25 TABLET, FILM COATED ORAL at 20:01

## 2025-04-23 RX ADMIN — PIPERACILLIN SODIUM AND TAZOBACTAM SODIUM 2.25 G: 2; .25 INJECTION, SOLUTION INTRAVENOUS at 21:04

## 2025-04-23 RX ADMIN — HYDROMORPHONE HYDROCHLORIDE 0.6 MG: 1 INJECTION, SOLUTION INTRAMUSCULAR; INTRAVENOUS; SUBCUTANEOUS at 12:53

## 2025-04-23 RX ADMIN — CLONIDINE HYDROCHLORIDE 0.2 MG: 0.2 TABLET ORAL at 18:02

## 2025-04-23 RX ADMIN — CLONIDINE HYDROCHLORIDE 0.2 MG: 0.2 TABLET ORAL at 21:04

## 2025-04-23 RX ADMIN — METOPROLOL TARTRATE 25 MG: 25 TABLET, FILM COATED ORAL at 08:06

## 2025-04-23 RX ADMIN — HYDROMORPHONE HYDROCHLORIDE 0.6 MG: 1 INJECTION, SOLUTION INTRAMUSCULAR; INTRAVENOUS; SUBCUTANEOUS at 04:34

## 2025-04-23 RX ADMIN — PIPERACILLIN SODIUM AND TAZOBACTAM SODIUM 2.25 G: 2; .25 INJECTION, SOLUTION INTRAVENOUS at 06:38

## 2025-04-23 RX ADMIN — PROMETHAZINE HYDROCHLORIDE 12.5 MG: 25 TABLET ORAL at 06:37

## 2025-04-23 RX ADMIN — MIRTAZAPINE 15 MG: 15 TABLET, FILM COATED ORAL at 20:01

## 2025-04-23 RX ADMIN — DIPHENHYDRAMINE HYDROCHLORIDE 50 MG: 50 INJECTION, SOLUTION INTRAMUSCULAR; INTRAVENOUS at 04:34

## 2025-04-23 RX ADMIN — DIPHENHYDRAMINE HYDROCHLORIDE 50 MG: 50 INJECTION, SOLUTION INTRAMUSCULAR; INTRAVENOUS at 01:42

## 2025-04-23 RX ADMIN — HYDROMORPHONE HYDROCHLORIDE 0.6 MG: 1 INJECTION, SOLUTION INTRAMUSCULAR; INTRAVENOUS; SUBCUTANEOUS at 07:46

## 2025-04-23 RX ADMIN — CLONIDINE HYDROCHLORIDE 0.2 MG: 0.2 TABLET ORAL at 00:27

## 2025-04-23 RX ADMIN — HYDROMORPHONE HYDROCHLORIDE 0.6 MG: 1 INJECTION, SOLUTION INTRAMUSCULAR; INTRAVENOUS; SUBCUTANEOUS at 19:50

## 2025-04-23 RX ADMIN — HYDROMORPHONE HYDROCHLORIDE 0.6 MG: 1 INJECTION, SOLUTION INTRAMUSCULAR; INTRAVENOUS; SUBCUTANEOUS at 10:51

## 2025-04-23 RX ADMIN — DIPHENHYDRAMINE HYDROCHLORIDE 50 MG: 50 INJECTION, SOLUTION INTRAMUSCULAR; INTRAVENOUS at 10:51

## 2025-04-23 RX ADMIN — HYDROMORPHONE HYDROCHLORIDE 0.6 MG: 1 INJECTION, SOLUTION INTRAMUSCULAR; INTRAVENOUS; SUBCUTANEOUS at 16:18

## 2025-04-23 ASSESSMENT — COGNITIVE AND FUNCTIONAL STATUS - GENERAL
DAILY ACTIVITIY SCORE: 24
MOBILITY SCORE: 24

## 2025-04-23 ASSESSMENT — PAIN DESCRIPTION - ORIENTATION: ORIENTATION: RIGHT;LEFT

## 2025-04-23 ASSESSMENT — PAIN DESCRIPTION - DESCRIPTORS
DESCRIPTORS: ACHING;DISCOMFORT
DESCRIPTORS: SHARP
DESCRIPTORS: ACHING
DESCRIPTORS: ACHING;DISCOMFORT
DESCRIPTORS: SHARP

## 2025-04-23 ASSESSMENT — PAIN SCALES - GENERAL
PAINLEVEL_OUTOF10: 7
PAINLEVEL_OUTOF10: 10 - WORST POSSIBLE PAIN
PAINLEVEL_OUTOF10: 0 - NO PAIN
PAINLEVEL_OUTOF10: 10 - WORST POSSIBLE PAIN
PAINLEVEL_OUTOF10: 10 - WORST POSSIBLE PAIN
PAINLEVEL_OUTOF10: 7
PAINLEVEL_OUTOF10: 10 - WORST POSSIBLE PAIN
PAINLEVEL_OUTOF10: 10 - WORST POSSIBLE PAIN
PAINLEVEL_OUTOF10: 5 - MODERATE PAIN

## 2025-04-23 ASSESSMENT — PAIN DESCRIPTION - LOCATION: LOCATION: GENERALIZED

## 2025-04-23 NOTE — NURSING NOTE
Pt blood pressure continues to be elevated with no scheduled blood pressure medications available.  Dr berna davey aware, new orders

## 2025-04-23 NOTE — POST-PROCEDURE NOTE
Report to Receiving RN:    Report To: KATHARINE DUNHAM RN  Time Report Called: [id number]  Hand-Off Communication: PATIENT ENDED TX EARLY DUE TO PAINFUL CATH.  Complications During Treatment: No  Ultrafiltration Treatment: No  Medications Administered During Dialysis: No  Blood Products Administered During Dialysis: No  Labs Sent During Dialysis: No  Heparin Drip Rate Changes: No  Dialysis Catheter Dressing: CDI   Last Dressing Change: 4/23/2025    Electronic Signatures:  HELDER TRINIDAD OCDT    Last Updated: 4:02 PM by HELDER TRINIDAD

## 2025-04-23 NOTE — NURSING NOTE
Pt accepted prn pain medication, prn IV benadryl and scheduled PO metoprolol. Kindly refused all other scheduled morning medications (see MAR) stating she has been nauseous and does not want to upset her stomach.

## 2025-04-23 NOTE — PROGRESS NOTES
"Batsheva Page is a 49 y.o. female on day 6 of admission presenting with Right foot infection.      Subjective   Patient’s chart was reviewed, and her case was discussed with the nursing staff. Nursing reported that patient is behaviorally stable, eating and sleeping adequately, and has not exhibited any signs of agitation or combativeness.    Patient was evaluated during dialysis and was alert and responsive upon approach. She reported that her mood is \"good\" and denied any current suicidal or homicidal ideation, as well as auditory or visual hallucinations. She stated that she slept well last night. Although she described her appetite as good, she noted feeling nauseated today, which led to decreased oral intake. Patient denied experiencing any side effects from the increased dose of Remeron.       Objective     Last Recorded Vitals  Blood pressure (!) 177/91, pulse 63, temperature 36.4 °C (97.5 °F), temperature source Temporal, resp. rate 16, height 1.727 m (5' 8\"), weight 73.4 kg (161 lb 13.1 oz), SpO2 93%.    Sleep Log  No Safety Checks orders active in given range     Review of Systems    Psychiatric ROS - Adult  Depression: fatigue or loss of energy, markedly diminished interest or pleasure in all or most activities, and changes in appetite or weight leading to significant weight loss or gain unintentionally-improving  Anxiety: restlessness or feeling keyed up or on edge and irritability-improving  Suzanne: negative  Psychosis: negative  Delirium: negative   Trauma: negative     Physical Exam     Mental Status Exam  General: 49 years old female, lying in bed during interview.  Appearance: Appeared as age stated; appropriately dressed/groomed.  Attitude:  calm, cooperative  Behavior: Fair EC; overall responding appropriately  Motor Activity: No notable boby PMAR  Speech: Clear, with fair phonation, and no lisp nor dysarthria.   Mood: \"good\"  Affect: appropriate and mood congruent  Thought Process: Linear " and logical; not perseverating   Thought Content: Denied SI/HI. Not voicing/endorsing delusions.  Thought Perception: Did not appear to be responding to internal stimuli. Not endorsing AVH  Cognition: Grossly intact; A&O x4/4 to self, place, date, and context.  Insight: Good as evidenced by patient's awareness and understanding of symptoms and benefit of treatment  Judgement: good, compliant with her scheduled medications     Psychiatric Risk Assessment  Violence Risk Factors:  current psychiatric illness and stress/destabilizers  Acute Risk of Harm to Others is Considered: Low  Suicide Risk Factors: chronic medical illness, chronic pain, and current psychiatric illness  Protective Factors: social support/connectedness and hopefulness/future-orientation  Acute Risk of Harm to Self is Considered: Low    Relevant Results  Results for orders placed or performed during the hospital encounter of 04/17/25 (from the past 96 hours)   CBC   Result Value Ref Range    WBC 5.0 4.4 - 11.3 x10*3/uL    nRBC 0.0 0.0 - 0.0 /100 WBCs    RBC 3.12 (L) 4.00 - 5.20 x10*6/uL    Hemoglobin 9.5 (L) 12.0 - 16.0 g/dL    Hematocrit 31.0 (L) 36.0 - 46.0 %    MCV 99 80 - 100 fL    MCH 30.4 26.0 - 34.0 pg    MCHC 30.6 (L) 32.0 - 36.0 g/dL    RDW 16.8 (H) 11.5 - 14.5 %    Platelets 135 (L) 150 - 450 x10*3/uL   Renal Function Panel   Result Value Ref Range    Glucose 113 (H) 74 - 99 mg/dL    Sodium 136 136 - 145 mmol/L    Potassium 5.0 3.5 - 5.3 mmol/L    Chloride 98 98 - 107 mmol/L    Bicarbonate 27 21 - 32 mmol/L    Anion Gap 16 10 - 20 mmol/L    Urea Nitrogen 22 6 - 23 mg/dL    Creatinine 3.90 (H) 0.50 - 1.05 mg/dL    eGFR 13 (L) >60 mL/min/1.73m*2    Calcium 7.0 (L) 8.6 - 10.3 mg/dL    Phosphorus 4.5 2.5 - 4.9 mg/dL    Albumin 3.1 (L) 3.4 - 5.0 g/dL   Magnesium   Result Value Ref Range    Magnesium 1.72 1.60 - 2.40 mg/dL   CBC   Result Value Ref Range    WBC 8.1 4.4 - 11.3 x10*3/uL    nRBC 0.0 0.0 - 0.0 /100 WBCs    RBC 3.30 (L) 4.00 - 5.20  x10*6/uL    Hemoglobin 10.1 (L) 12.0 - 16.0 g/dL    Hematocrit 32.4 (L) 36.0 - 46.0 %    MCV 98 80 - 100 fL    MCH 30.6 26.0 - 34.0 pg    MCHC 31.2 (L) 32.0 - 36.0 g/dL    RDW 16.7 (H) 11.5 - 14.5 %    Platelets 145 (L) 150 - 450 x10*3/uL   Renal Function Panel   Result Value Ref Range    Glucose 97 74 - 99 mg/dL    Sodium 136 136 - 145 mmol/L    Potassium 5.3 3.5 - 5.3 mmol/L    Chloride 97 (L) 98 - 107 mmol/L    Bicarbonate 24 21 - 32 mmol/L    Anion Gap 20 10 - 20 mmol/L    Urea Nitrogen 31 (H) 6 - 23 mg/dL    Creatinine 5.58 (H) 0.50 - 1.05 mg/dL    eGFR 9 (L) >60 mL/min/1.73m*2    Calcium 7.0 (L) 8.6 - 10.3 mg/dL    Phosphorus 5.5 (H) 2.5 - 4.9 mg/dL    Albumin 3.3 (L) 3.4 - 5.0 g/dL   Magnesium   Result Value Ref Range    Magnesium 1.74 1.60 - 2.40 mg/dL   Vancomycin   Result Value Ref Range    Vancomycin 42.6 (H) 5.0 - 20.0 ug/mL   Protime-INR   Result Value Ref Range    Protime 11.7 9.3 - 12.7 seconds    INR 1.1 0.9 - 1.2   CBC   Result Value Ref Range    WBC 5.4 4.4 - 11.3 x10*3/uL    nRBC 0.0 0.0 - 0.0 /100 WBCs    RBC 3.26 (L) 4.00 - 5.20 x10*6/uL    Hemoglobin 10.0 (L) 12.0 - 16.0 g/dL    Hematocrit 31.4 (L) 36.0 - 46.0 %    MCV 96 80 - 100 fL    MCH 30.7 26.0 - 34.0 pg    MCHC 31.8 (L) 32.0 - 36.0 g/dL    RDW 16.5 (H) 11.5 - 14.5 %    Platelets 144 (L) 150 - 450 x10*3/uL   Renal Function Panel   Result Value Ref Range    Glucose 99 74 - 99 mg/dL    Sodium 137 136 - 145 mmol/L    Potassium 4.4 3.5 - 5.3 mmol/L    Chloride 95 (L) 98 - 107 mmol/L    Bicarbonate 29 21 - 32 mmol/L    Anion Gap 17 10 - 20 mmol/L    Urea Nitrogen 17 6 - 23 mg/dL    Creatinine 3.88 (H) 0.50 - 1.05 mg/dL    eGFR 14 (L) >60 mL/min/1.73m*2    Calcium 7.5 (L) 8.6 - 10.3 mg/dL    Phosphorus 4.8 2.5 - 4.9 mg/dL    Albumin 3.3 (L) 3.4 - 5.0 g/dL   Magnesium   Result Value Ref Range    Magnesium 1.84 1.60 - 2.40 mg/dL   Vancomycin   Result Value Ref Range    Vancomycin 31.8 (H) 5.0 - 20.0 ug/mL   CBC   Result Value Ref Range    WBC 6.1  4.4 - 11.3 x10*3/uL    nRBC 0.0 0.0 - 0.0 /100 WBCs    RBC 3.06 (L) 4.00 - 5.20 x10*6/uL    Hemoglobin 9.5 (L) 12.0 - 16.0 g/dL    Hematocrit 29.6 (L) 36.0 - 46.0 %    MCV 97 80 - 100 fL    MCH 31.0 26.0 - 34.0 pg    MCHC 32.1 32.0 - 36.0 g/dL    RDW 16.6 (H) 11.5 - 14.5 %    Platelets 163 150 - 450 x10*3/uL   Renal Function Panel   Result Value Ref Range    Glucose 89 74 - 99 mg/dL    Sodium 137 136 - 145 mmol/L    Potassium 4.5 3.5 - 5.3 mmol/L    Chloride 96 (L) 98 - 107 mmol/L    Bicarbonate 27 21 - 32 mmol/L    Anion Gap 19 10 - 20 mmol/L    Urea Nitrogen 29 (H) 6 - 23 mg/dL    Creatinine 5.76 (H) 0.50 - 1.05 mg/dL    eGFR 8 (L) >60 mL/min/1.73m*2    Calcium 6.9 (L) 8.6 - 10.3 mg/dL    Phosphorus 5.8 (H) 2.5 - 4.9 mg/dL    Albumin 3.2 (L) 3.4 - 5.0 g/dL   Magnesium   Result Value Ref Range    Magnesium 1.94 1.60 - 2.40 mg/dL   Vancomycin   Result Value Ref Range    Vancomycin 28.6 (H) 5.0 - 20.0 ug/mL   hCG, quantitative, pregnancy   Result Value Ref Range    HCG, Beta-Quantitative 2 <5 mIU/mL   Pertinent labs were reviewed    Scheduled medications  Scheduled Medications[1]  Continuous medications  Continuous Medications[2]  PRN medications  PRN Medications[3]    Assessment & Plan  Right foot infection    Hyperkalemia    Depression  Anxiety     IMPRESSION  49-year-old female with a complex medical history including ESRD on hemodialysis, multiple cardiac valve replacements, recurrent bacteremia, and seizure disorder, presenting with concern for dialysis catheter infection. She reports worsening depressive and anxiety symptoms over the past several months, including poor sleep, appetite, low energy, and anhedonia. These symptoms appear to be compounded by her chronic medical conditions and ongoing physical discomfort. She denies suicidal or homicidal ideation, hallucinations, or past suicide attempts, and reports feeling safe at home. Patient was previously evaluated by psychiatric services in May 2024 and  "started on Remeron 15 mg at bedtime. She does not recall taking this medication or the reason for its discontinuation. Given her current symptoms and willingness to engage in treatment, a re-initiation of Remeron was discussed and agreed upon.   04/19/25 Denies current SI/HI, AH/VH. Reports sleeping well last night. No adverse reactions from Remeron reported.   04/20/2025 patient is calm, compliant with meds, pleasant upon approach today. Denies SI/HI, AH/VH.   04/21/24 Patient reports she currently awaiting dialysis catheter replacement, which she postponed until general anesthesia services are available. Patient remains cooperative and compliant with medications, denies suicidal or homicidal ideation and perceptual disturbances. Notable for poor sleep and \"so-so to ok\" appetite. No adverse effects noted from Remeron. Plan to increase Remeron to 15 mg nightly to address ongoing symptoms.  04/23/25 Patient remains behaviorally stable with no signs of agitation or safety concerns. She reports improved mood and tolerates the recent Remeron dose increase without adverse effects. Denies SI/HI, AH/VH, anxiety symptoms today     PLAN/ RECOMMENDATIONS:      -Continue Remeron to 15 mg po nightly to help with sleep, appetite, and mood/depression.   -Atarax 25 mg po every 6 hours as needed ordered for anxiety     - Patient does not currently meet criteria for inpatient psychiatric admission.      Medication Consent  Medication Consent: risks, benefits, side effects reviewed for all ordered meds and patient/caregiver expressed understanding and consent obtained.     -Thank you for allowing us to participate in the care of this patient. Psychiatry will sign off at this time. Please re consult if needed.       I spent 25 minutes in the professional and overall care of this patient.      Sterling Asif, ORI-CNP           [1] aspirin, 81 mg, oral, Daily  atorvastatin, 40 mg, oral, Daily  calcitriol, 0.5 mcg, oral, " Daily  cloNIDine, 0.2 mg, oral, q8h KIMBERLY  epoetin brandy-epbx, 100 Units/kg, subcutaneous, Once per day on Monday Wednesday Friday  ergocalciferol, 1.25 mg, oral, Every Sunday  heparin, 1,000 Units, intra-catheter, After Dialysis  heparin, 1,000 Units, intra-catheter, After Dialysis  levETIRAcetam, 750 mg, oral, Nightly  metoprolol tartrate, 25 mg, oral, BID  mirtazapine, 15 mg, oral, Nightly  mupirocin, , Topical, TID  piperacillin-tazobactam, 2.25 g, intravenous, q8h  pregabalin, 50 mg, oral, BID  vancomycin 5 mg/mL in NS lock, 2 mL, intra-catheter, Once  [2]    [3] PRN medications: acetaminophen, diphenhydrAMINE, hydrALAZINE, HYDROmorphone, hydrOXYzine HCL, [Held by provider] methocarbamol, oxyCODONE-acetaminophen, oxyCODONE-acetaminophen, promethazine, sodium chloride, vancomycin

## 2025-04-23 NOTE — ASSESSMENT & PLAN NOTE
Dialysis catheter site infection  Patient does have a history of bacteremia and endocarditis  Right groin dialysis catheter with erythema, edema, tenderness, drainage on admission   - Now, small nodule noted at site, but no redness or drainage from site noted   - non-functioning   - plan for removal tomorrow   Blood cultures negative to date  Continue IV antibiotics - per ID recs   Temporary catheter has been placed in the left groin without signs or symptoms of infection currently. Plan for tunneled cath tomorrow   Plan for anesthesia during cath removal/replacement   ID recommending resuming vancomycin dwell after each dialysis session  Pain management    ESRD on HD  Right groin dialysis catheter infected and nonfunctioning, will need removed prior to discharge  Left femoral temporary HD catheter placed 4/17  Potassium 6.1 on arrival - stat dialysis done   - improved   Continue home Retacrit, Calcitrol  IR consulted for tunneled line exchange  Nephrology consulted  Receives Benadryl prior to HD for itching, continue    HTN  Continue metoprolol and clonidine  PRN IV hydralazine for SBP > 180  Monitor blood pressure close    Coronary artery disease  Continue aspirin and statin    Seizure  Seizure precautions  Continue Keppra     Anxiety/depression  Psych consulted, appreciate recs  Remeron and Atarax ordered    Anemia secondary to chronic renal disease  Continue home Retacrit  Transfuse for Hgb > 7  Stable     PLAN:  DVT prophylaxis: SCDs, patient had declined pharmacologic prophylaxis  Renal diet  Antibiotics  IR consulted for tunneled line exchange  NPO after midnight  CBC, PT, and BMP in the morning  Monitor on telemetry  Hemodialysis per nephrology    Please continue current pain regimen: Dilaudid 0.6mg Q 3 hours, Benadryl 50mg IV Q 3 hours. May be given together. She has been tolerating and this is the regimen that has worked for her on previous admissions.     CODE STATUS: FULL CODE

## 2025-04-23 NOTE — PROGRESS NOTES
Vancomycin Dosing by Pharmacy- FOLLOW-UP (HEMODIALYSIS)    Batsheva Page is a 49 y.o. year old female who Pharmacy has been consulted for vancomycin dosing for  empiric coverage for dialysis catheter . Based on the patient's indication and renal status this patient will be dosed based on a pre-HD level of 20-25 mcg/mL.     Patient is currently on hemodialysis User Schedule (TBD).    Current vancomycin regimen or maintenance dose: Held     Vancomycin pre-HD level 28.6 mcg/mL    Lab Results   Component Value Date    VANCORANDOM 28.6 (H) 04/23/2025    VANCOTROUGH 27.9 (HH) 09/26/2022       Visit Vitals  BP (!) 184/98 (BP Location: Right arm, Patient Position: Lying)   Pulse 63   Temp 36.7 °C (98.1 °F) (Oral)   Resp 17        Lab Results   Component Value Date    CREATININE 5.76 (H) 04/23/2025    CREATININE 3.88 (H) 04/22/2025    CREATININE 5.58 (H) 04/21/2025    CREATININE 3.90 (H) 04/20/2025       I/O last 3 completed shifts:  In: 730.5 (10 mL/kg) [P.O.:480; IV Piggyback:250.5]  Out: - (0 mL/kg)   Weight: 73.4 kg     Assessment/Plan     Level is above target trough goal  Hold postHD vanco  Next pre-HD level will be obtained prior to next HD session.  May be obtained sooner if clinically indicated.    Will continue to monitor renal function daily while on vancomycin and order serum creatinine at least every 48 hours if not already ordered.  Follow for continued vancomycin needs, clinical response, and signs/symptoms of toxicity.     Radha Fallon, PharmD

## 2025-04-23 NOTE — NURSING NOTE
Took over care of pt at this time. Pt laying in bed, alert, aox4. Male visitor at bedside. Pt acknowledge chronic pain at baseline with pain worse in right leg during this admission. Pt currently receiving pain medication from dayshift nurse. Pt has no other needs or complaints at this time. Pt oriented to call bell and it and belongings are placed in reach. Telemetry leads connected and reading on monitor. Bed alarm set. Continuing to monitor.

## 2025-04-23 NOTE — NURSING NOTE
Rounded on pt. Pt laying in bed, eyes closed, respirations even and unlabored. Pt appears to be sleeping and in no apparent pain or distress. Besides regular pain interventions and other PRN treatments (ie: nausea and anxiety/itching), uneventful night with no changes since prior assessment. Call bell and belongings are placed in reach. Telemetry leads connected and reading on monitor. Bed alarm set. Continuing to monitor.

## 2025-04-23 NOTE — PROGRESS NOTES
"Batsheva Page is a 49 y.o. female on day 6 of admission presenting with Right foot infection.    Subjective   Patient is seen for end-stage kidney disease and hyperkalemia admitted with malfunctioning right femoral permacath patient seen and examined on dialysis therapy tolerating procedure well she has no further nausea vomiting       Objective     Physical Exam  Constitutional:       General: She is not in acute distress.     Appearance: Normal appearance. She is not toxic-appearing.   Neck:      Vascular: No carotid bruit.   Cardiovascular:      Heart sounds: No murmur heard.     No friction rub. No gallop.   Pulmonary:      Breath sounds: No wheezing, rhonchi or rales.   Abdominal:      Tenderness: There is no abdominal tenderness. There is no right CVA tenderness, left CVA tenderness or rebound.   Musculoskeletal:         General: No tenderness.      Cervical back: Neck supple.      Right lower leg: No edema.      Left lower leg: No edema.   Lymphadenopathy:      Cervical: No cervical adenopathy.         Last Recorded Vitals  Blood pressure (!) 185/98, pulse 63, temperature 36.4 °C (97.5 °F), temperature source Temporal, resp. rate 16, height 1.727 m (5' 8\"), weight 73.4 kg (161 lb 13.1 oz), SpO2 93%.    Intake/Output last 3 Shifts:  I/O last 3 completed shifts:  In: 730.5 (10 mL/kg) [P.O.:480; IV Piggyback:250.5]  Out: - (0 mL/kg)   Weight: 73.4 kg   Current Medications[1]   Relevant Results    Results for orders placed or performed during the hospital encounter of 04/17/25 (from the past 96 hours)   CBC   Result Value Ref Range    WBC 5.0 4.4 - 11.3 x10*3/uL    nRBC 0.0 0.0 - 0.0 /100 WBCs    RBC 3.12 (L) 4.00 - 5.20 x10*6/uL    Hemoglobin 9.5 (L) 12.0 - 16.0 g/dL    Hematocrit 31.0 (L) 36.0 - 46.0 %    MCV 99 80 - 100 fL    MCH 30.4 26.0 - 34.0 pg    MCHC 30.6 (L) 32.0 - 36.0 g/dL    RDW 16.8 (H) 11.5 - 14.5 %    Platelets 135 (L) 150 - 450 x10*3/uL   Renal Function Panel   Result Value Ref Range    " Glucose 113 (H) 74 - 99 mg/dL    Sodium 136 136 - 145 mmol/L    Potassium 5.0 3.5 - 5.3 mmol/L    Chloride 98 98 - 107 mmol/L    Bicarbonate 27 21 - 32 mmol/L    Anion Gap 16 10 - 20 mmol/L    Urea Nitrogen 22 6 - 23 mg/dL    Creatinine 3.90 (H) 0.50 - 1.05 mg/dL    eGFR 13 (L) >60 mL/min/1.73m*2    Calcium 7.0 (L) 8.6 - 10.3 mg/dL    Phosphorus 4.5 2.5 - 4.9 mg/dL    Albumin 3.1 (L) 3.4 - 5.0 g/dL   Magnesium   Result Value Ref Range    Magnesium 1.72 1.60 - 2.40 mg/dL   CBC   Result Value Ref Range    WBC 8.1 4.4 - 11.3 x10*3/uL    nRBC 0.0 0.0 - 0.0 /100 WBCs    RBC 3.30 (L) 4.00 - 5.20 x10*6/uL    Hemoglobin 10.1 (L) 12.0 - 16.0 g/dL    Hematocrit 32.4 (L) 36.0 - 46.0 %    MCV 98 80 - 100 fL    MCH 30.6 26.0 - 34.0 pg    MCHC 31.2 (L) 32.0 - 36.0 g/dL    RDW 16.7 (H) 11.5 - 14.5 %    Platelets 145 (L) 150 - 450 x10*3/uL   Renal Function Panel   Result Value Ref Range    Glucose 97 74 - 99 mg/dL    Sodium 136 136 - 145 mmol/L    Potassium 5.3 3.5 - 5.3 mmol/L    Chloride 97 (L) 98 - 107 mmol/L    Bicarbonate 24 21 - 32 mmol/L    Anion Gap 20 10 - 20 mmol/L    Urea Nitrogen 31 (H) 6 - 23 mg/dL    Creatinine 5.58 (H) 0.50 - 1.05 mg/dL    eGFR 9 (L) >60 mL/min/1.73m*2    Calcium 7.0 (L) 8.6 - 10.3 mg/dL    Phosphorus 5.5 (H) 2.5 - 4.9 mg/dL    Albumin 3.3 (L) 3.4 - 5.0 g/dL   Magnesium   Result Value Ref Range    Magnesium 1.74 1.60 - 2.40 mg/dL   Vancomycin   Result Value Ref Range    Vancomycin 42.6 (H) 5.0 - 20.0 ug/mL   Protime-INR   Result Value Ref Range    Protime 11.7 9.3 - 12.7 seconds    INR 1.1 0.9 - 1.2   CBC   Result Value Ref Range    WBC 5.4 4.4 - 11.3 x10*3/uL    nRBC 0.0 0.0 - 0.0 /100 WBCs    RBC 3.26 (L) 4.00 - 5.20 x10*6/uL    Hemoglobin 10.0 (L) 12.0 - 16.0 g/dL    Hematocrit 31.4 (L) 36.0 - 46.0 %    MCV 96 80 - 100 fL    MCH 30.7 26.0 - 34.0 pg    MCHC 31.8 (L) 32.0 - 36.0 g/dL    RDW 16.5 (H) 11.5 - 14.5 %    Platelets 144 (L) 150 - 450 x10*3/uL   Renal Function Panel   Result Value Ref Range     Glucose 99 74 - 99 mg/dL    Sodium 137 136 - 145 mmol/L    Potassium 4.4 3.5 - 5.3 mmol/L    Chloride 95 (L) 98 - 107 mmol/L    Bicarbonate 29 21 - 32 mmol/L    Anion Gap 17 10 - 20 mmol/L    Urea Nitrogen 17 6 - 23 mg/dL    Creatinine 3.88 (H) 0.50 - 1.05 mg/dL    eGFR 14 (L) >60 mL/min/1.73m*2    Calcium 7.5 (L) 8.6 - 10.3 mg/dL    Phosphorus 4.8 2.5 - 4.9 mg/dL    Albumin 3.3 (L) 3.4 - 5.0 g/dL   Magnesium   Result Value Ref Range    Magnesium 1.84 1.60 - 2.40 mg/dL   Vancomycin   Result Value Ref Range    Vancomycin 31.8 (H) 5.0 - 20.0 ug/mL   CBC   Result Value Ref Range    WBC 6.1 4.4 - 11.3 x10*3/uL    nRBC 0.0 0.0 - 0.0 /100 WBCs    RBC 3.06 (L) 4.00 - 5.20 x10*6/uL    Hemoglobin 9.5 (L) 12.0 - 16.0 g/dL    Hematocrit 29.6 (L) 36.0 - 46.0 %    MCV 97 80 - 100 fL    MCH 31.0 26.0 - 34.0 pg    MCHC 32.1 32.0 - 36.0 g/dL    RDW 16.6 (H) 11.5 - 14.5 %    Platelets 163 150 - 450 x10*3/uL   Renal Function Panel   Result Value Ref Range    Glucose 89 74 - 99 mg/dL    Sodium 137 136 - 145 mmol/L    Potassium 4.5 3.5 - 5.3 mmol/L    Chloride 96 (L) 98 - 107 mmol/L    Bicarbonate 27 21 - 32 mmol/L    Anion Gap 19 10 - 20 mmol/L    Urea Nitrogen 29 (H) 6 - 23 mg/dL    Creatinine 5.76 (H) 0.50 - 1.05 mg/dL    eGFR 8 (L) >60 mL/min/1.73m*2    Calcium 6.9 (L) 8.6 - 10.3 mg/dL    Phosphorus 5.8 (H) 2.5 - 4.9 mg/dL    Albumin 3.2 (L) 3.4 - 5.0 g/dL   Magnesium   Result Value Ref Range    Magnesium 1.94 1.60 - 2.40 mg/dL   Vancomycin   Result Value Ref Range    Vancomycin 28.6 (H) 5.0 - 20.0 ug/mL   hCG, quantitative, pregnancy   Result Value Ref Range    HCG, Beta-Quantitative 2 <5 mIU/mL         Assessment & Plan:     End-stage renal disease dialysis on Tuesday Thursday Saturday patient to have a new permacath placed on Friday  Hyperkalemia solved  Infection of dialysis catheter site and malfunctioning dialysis catheter- IR consulted for tunneled dialysis catheter exchange    Zhou Pedersen MD         [1]   Current  Facility-Administered Medications:     acetaminophen (Tylenol) tablet 650 mg, 650 mg, oral, q6h PRN, ORI Kincaid-CNP    aspirin EC tablet 81 mg, 81 mg, oral, Daily, ORI Kincaid-CNP, 81 mg at 04/20/25 0814    atorvastatin (Lipitor) tablet 40 mg, 40 mg, oral, Daily, ORI Kincaid-CNP, 40 mg at 04/20/25 0814    calcitriol (Rocaltrol) capsule 0.5 mcg, 0.5 mcg, oral, Daily, ORI Kincaid-CNP, 0.5 mcg at 04/20/25 0814    cloNIDine (Catapres) tablet 0.2 mg, 0.2 mg, oral, q8h KIMBERLY, Zhou Pedersen MD, 0.2 mg at 04/23/25 0637    diphenhydrAMINE (BENADryl) injection 50 mg, 50 mg, intravenous, q3h PRN, ORI Kincaid-CNP, 50 mg at 04/23/25 1051    epoetin brandy-epbx (Retacrit) injection 6,440 Units, 100 Units/kg, subcutaneous, Once per day on Monday Wednesday Friday, ORI Kincaid-CNP, 6,440 Units at 04/21/25 1842    ergocalciferol (Vitamin D-2) capsule 1.25 mg, 1.25 mg, oral, Every Sunday, ORI Kincaid-CNP, 1.25 mg at 04/20/25 0814    heparin 1,000 unit/mL injection 1,000 Units, 1,000 Units, intra-catheter, After Dialysis, ORI Cheatham-CNP    heparin 1,000 unit/mL injection 1,000 Units, 1,000 Units, intra-catheter, After Dialysis, ORI Cheatham-CNP, 1,200 Units at 04/21/25 1742    hydrALAZINE (Apresoline) injection 5 mg, 5 mg, intravenous, q6h PRN, ORI Huynh-CNP    HYDROmorphone (Dilaudid) injection 0.6 mg, 0.6 mg, intravenous, q3h PRN, ANGELA Kincaid, 0.6 mg at 04/23/25 1051    hydrOXYzine HCL (Atarax) tablet 25 mg, 25 mg, oral, q6h PRN, ANGELA Michael, 25 mg at 04/23/25 0424    levETIRAcetam (Keppra) tablet 750 mg, 750 mg, oral, Nightly, ANGELA Kincaid, 750 mg at 04/18/25 2125    [Held by provider] methocarbamol (Robaxin) tablet 750 mg, 750 mg, oral, q6h PRN, Gloria Carrion PA-C    metoprolol tartrate (Lopressor) tablet 25 mg, 25 mg, oral, BID, ANGELA Kincaid, 25 mg at 04/23/25 0806     mirtazapine (Remeron) tablet 15 mg, 15 mg, oral, Nightly, Sterling Asif, ANGELA, 15 mg at 04/22/25 2150    mupirocin (Bactroban) 2 % ointment, , Topical, TID, ANGELA Huynh, Given at 04/22/25 2153    oxyCODONE-acetaminophen (Percocet)  mg per tablet 1 tablet, 1 tablet, oral, q6h PRN, ANGELA Kincaid, 1 tablet at 04/18/25 0632    oxyCODONE-acetaminophen (Percocet) 5-325 mg per tablet 1 tablet, 1 tablet, oral, q6h PRN, ORI Kincaid-CNP    piperacillin-tazobactam (Zosyn) 2.25 g in dextrose (iso) IV 50 mL, 2.25 g, intravenous, q8h, ANGELA Kincaid, Stopped at 04/23/25 0739    pregabalin (Lyrica) capsule 50 mg, 50 mg, oral, BID, ANGELA Kincaid, 50 mg at 04/20/25 0814    promethazine (Phenergan) tablet 12.5 mg, 12.5 mg, oral, q6h PRN, ANGELA Huynh, 12.5 mg at 04/23/25 0637    sodium chloride 0.9 % bolus 200 mL, 200 mL, intravenous, q1h PRN, Vu Pedersen MD    vancomycin (Vancocin) pharmacy to dose - pharmacy monitoring, , miscellaneous, Daily PRN, ORI Kincaid-CNP    vancomycin 5 mg/mL in NS lock, 2 mL, intra-catheter, Once, Adama Soto MD

## 2025-04-23 NOTE — NURSING NOTE
Assumed care. Pt is awake in bed alert x4. Pt has current 10/10 complaints of generalized pains, will medicate per MAR. Pt to have HD today, no time scheduled as of yet. Call light is in reach. Will continue with POC.

## 2025-04-23 NOTE — NURSING NOTE
This writer called Dialysis to get estimated time for pt session which is roughly 1400. Pt made aware. Pt daughter now at bedside and brought in lunch for pt. Pt was medicated again for pain, see MAR.

## 2025-04-23 NOTE — NURSING NOTE
Rounded on pt. Pt laying in bed, alert, aox4. Pt acknowledge chronic pain at baseline with pain worse in right leg during this admission. Pain slightly improved with last pain intervention. Pt receptive to phenergan which is available and which I proceed to administer. Clonidine given as initial time was too close to last administration and the delay was timed for after midnight vitals were collected. Pt has no other needs or complaints at this time. Pt oriented to call bell and it and belongings are placed in reach. Telemetry leads connected and reading on monitor. Bed alarm set. Continuing to monitor.

## 2025-04-23 NOTE — CARE PLAN
The patient's goals for the shift include  manage pain    The clinical goals for the shift include manage pain    Problem: Pain - Adult  Goal: Verbalizes/displays adequate comfort level or baseline comfort level  Outcome: Progressing     Problem: Safety - Adult  Goal: Free from fall injury  Outcome: Progressing     Problem: Discharge Planning  Goal: Discharge to home or other facility with appropriate resources  Outcome: Progressing     Problem: Chronic Conditions and Co-morbidities  Goal: Patient's chronic conditions and co-morbidity symptoms are monitored and maintained or improved  Outcome: Progressing     Problem: Nutrition  Goal: Nutrient intake appropriate for maintaining nutritional needs  Outcome: Progressing

## 2025-04-23 NOTE — PROGRESS NOTES
"INFECTIOUS DISEASES PROGRESS NOTE    Consulted / following patient for:  Infected vascular catheter    Subjective   Interval History:   Yesterday she was talking about stopping all dialysis, but today she is in a more stable frame of mind and volunteers that she should defer such a decision until sometime in the future when she is not dealing with hospitalization and the acute issues with vascular access.    Objective   PHYSICAL EXAMINATION  Vital signs:  Visit Vitals  BP (!) 155/94 (BP Location: Right arm, Patient Position: Lying)   Pulse 69   Temp 36.8 °C (98.2 °F) (Oral)   Resp 16      General: Not toxic  Extremities: No change in the appearance of the nonfunctioning right femoral dialysis catheter.  Temporary left femoral dialysis catheter in place    Relevant Results  WBC: 6100    Microbiology:  Blood: Negative X4  Nasal swab: MSSA    ASSESSMENT:  Nonfunctioning femoral dialysis catheter  Patient has an extensive history of nonfunctioning and infected dialysis catheters and has had multiple episodes of high-grade MRSA bacteremia.  Most recently, she has been treated with a 5 mg/mL vancomycin \"dwell\" after each dialysis session.  At this time there is no evidence for catheter-associated bacteremia     PLANS:  -   Continue Zosyn and post-dialysis vancomycin pending removal of the nonfunctioning right femoral dialysis catheter  -   Suggest continuing vancomycin 5 mg/mL \"dwell\" after each dialysis session    Adama Soto MD  [facility]  Office:  [phone]  "

## 2025-04-23 NOTE — PROGRESS NOTES
"Batsheva Page is a 49 y.o. female on day 6 of admission presenting with Right foot infection.    Subjective   Patient resting in bed. States pain controlled with current regimen. Denies chest pain, shortness of breath, abdominal pain. States nausea is improved today        Objective     Physical Exam  Constitutional:       Appearance: Normal appearance.   HENT:      Head: Normocephalic and atraumatic.      Mouth/Throat:      Mouth: Mucous membranes are moist.      Pharynx: Oropharynx is clear.   Eyes:      Extraocular Movements: Extraocular movements intact.      Conjunctiva/sclera: Conjunctivae normal.      Pupils: Pupils are equal, round, and reactive to light.   Cardiovascular:      Rate and Rhythm: Normal rate and regular rhythm.      Pulses: Normal pulses.      Heart sounds: Normal heart sounds.   Pulmonary:      Effort: Pulmonary effort is normal.      Breath sounds: Normal breath sounds.   Abdominal:      General: Bowel sounds are normal.      Palpations: Abdomen is soft.      Tenderness: There is no abdominal tenderness.   Musculoskeletal:         General: Normal range of motion.      Cervical back: Normal range of motion and neck supple.   Skin:     General: Skin is warm and dry.      Capillary Refill: Capillary refill takes less than 2 seconds.   Neurological:      General: No focal deficit present.      Mental Status: She is alert and oriented to person, place, and time.   Psychiatric:         Mood and Affect: Mood normal.         Behavior: Behavior normal.         Last Recorded Vitals  Blood pressure (!) 178/107, pulse 71, temperature 36.6 °C (97.9 °F), temperature source Oral, resp. rate 18, height 1.727 m (5' 8\"), weight 73.4 kg (161 lb 13.1 oz), SpO2 94%.  Intake/Output last 3 Shifts:  I/O last 3 completed shifts:  In: 730.5 (10 mL/kg) [P.O.:480; IV Piggyback:250.5]  Out: - (0 mL/kg)   Weight: 73.4 kg     Relevant Results  Results for orders placed or performed during the hospital encounter of " 04/17/25 (from the past 24 hours)   CBC   Result Value Ref Range    WBC 6.1 4.4 - 11.3 x10*3/uL    nRBC 0.0 0.0 - 0.0 /100 WBCs    RBC 3.06 (L) 4.00 - 5.20 x10*6/uL    Hemoglobin 9.5 (L) 12.0 - 16.0 g/dL    Hematocrit 29.6 (L) 36.0 - 46.0 %    MCV 97 80 - 100 fL    MCH 31.0 26.0 - 34.0 pg    MCHC 32.1 32.0 - 36.0 g/dL    RDW 16.6 (H) 11.5 - 14.5 %    Platelets 163 150 - 450 x10*3/uL   Renal Function Panel   Result Value Ref Range    Glucose 89 74 - 99 mg/dL    Sodium 137 136 - 145 mmol/L    Potassium 4.5 3.5 - 5.3 mmol/L    Chloride 96 (L) 98 - 107 mmol/L    Bicarbonate 27 21 - 32 mmol/L    Anion Gap 19 10 - 20 mmol/L    Urea Nitrogen 29 (H) 6 - 23 mg/dL    Creatinine 5.76 (H) 0.50 - 1.05 mg/dL    eGFR 8 (L) >60 mL/min/1.73m*2    Calcium 6.9 (L) 8.6 - 10.3 mg/dL    Phosphorus 5.8 (H) 2.5 - 4.9 mg/dL    Albumin 3.2 (L) 3.4 - 5.0 g/dL   Magnesium   Result Value Ref Range    Magnesium 1.94 1.60 - 2.40 mg/dL   Vancomycin   Result Value Ref Range    Vancomycin 28.6 (H) 5.0 - 20.0 ug/mL   hCG, quantitative, pregnancy   Result Value Ref Range    HCG, Beta-Quantitative 2 <5 mIU/mL       Assessment & Plan  Hyperkalemia    Dialysis catheter site infection  Patient does have a history of bacteremia and endocarditis  Right groin dialysis catheter with erythema, edema, tenderness, drainage on admission   - Now, small nodule noted at site, but no redness or drainage from site noted   - non-functioning   - plan for removal tomorrow   Blood cultures negative to date  Continue IV antibiotics - per ID recs   Temporary catheter has been placed in the left groin without signs or symptoms of infection currently. Plan for tunneled cath tomorrow   Plan for anesthesia during cath removal/replacement   ID recommending resuming vancomycin dwell after each dialysis session  Pain management    ESRD on HD  Right groin dialysis catheter infected and nonfunctioning, will need removed prior to discharge  Left femoral temporary HD catheter placed  4/17  Potassium 6.1 on arrival - stat dialysis done   - improved   Continue home Retacrit, Calcitrol  IR consulted for tunneled line exchange  Nephrology consulted  Receives Benadryl prior to HD for itching, continue    HTN  Continue metoprolol and clonidine  PRN IV hydralazine for SBP > 180  Monitor blood pressure close    Coronary artery disease  Continue aspirin and statin    Seizure  Seizure precautions  Continue Keppra     Anxiety/depression  Psych consulted, appreciate recs  Remeron and Atarax ordered    Anemia secondary to chronic renal disease  Continue home Retacrit  Transfuse for Hgb > 7  Stable     PLAN:  DVT prophylaxis: SCDs, patient had declined pharmacologic prophylaxis  Renal diet  Antibiotics  IR consulted for tunneled line exchange  NPO after midnight  CBC, PT, and BMP in the morning  Monitor on telemetry  Hemodialysis per nephrology    Please continue current pain regimen: Dilaudid 0.6mg Q 3 hours, Benadryl 50mg IV Q 3 hours. May be given together. She has been tolerating and this is the regimen that has worked for her on previous admissions.     CODE STATUS: FULL CODE          Vania Escalante, APRN-CNP

## 2025-04-23 NOTE — CARE PLAN
Problem: Pain - Adult  Goal: Verbalizes/displays adequate comfort level or baseline comfort level  Outcome: Progressing     Problem: Safety - Adult  Goal: Free from fall injury  Outcome: Progressing     Problem: Discharge Planning  Goal: Discharge to home or other facility with appropriate resources  Outcome: Progressing     Problem: Chronic Conditions and Co-morbidities  Goal: Patient's chronic conditions and co-morbidity symptoms are monitored and maintained or improved  Outcome: Progressing     Problem: Nutrition  Goal: Nutrient intake appropriate for maintaining nutritional needs  Outcome: Progressing     Problem: Pain  Goal: Takes deep breaths with improved pain control throughout the shift  Outcome: Progressing  Goal: Turns in bed with improved pain control throughout the shift  Outcome: Progressing  Goal: Walks with improved pain control throughout the shift  Outcome: Progressing  Goal: Performs ADL's with improved pain control throughout shift  Outcome: Progressing  Goal: Participates in PT with improved pain control throughout the shift  Outcome: Progressing  Goal: Free from opioid side effects throughout the shift  Outcome: Progressing  Goal: Free from acute confusion related to pain meds throughout the shift  Outcome: Progressing   The patient's goals for the shift include      The clinical goals for the shift include manage pain    Over the shift, the patient did make progress toward the goals.

## 2025-04-23 NOTE — PRE-PROCEDURE NOTE
Report from Sending RN:    Report From: Tayla Navarro RN  Recent Surgery of Procedure: No  Baseline Level of Consciousness (LOC): a+ox4  Oxygen Use: No  Type: room air  Diabetic: No  Last BP Med Given Day of Dialysis: see emr  Last Pain Med Given: see emr  Lab Tests to be Obtained with Dialysis: No  Blood Transfusion to be Given During Dialysis: No  Available IV Access: Yes  Medications to be Administered During Dialysis: No  Continuous IV Infusion Running: No  Restraints on Currently or in the Last 24 Hours: No  Hand-Off Communication: Full code  Dialysis Catheter Dressing: cdi  Last Dressing Change: will assess

## 2025-04-23 NOTE — NURSING NOTE
Late Entry: Evening rounds, bedside shift report.  Pt with eventful day, hard time keeping blood pressure and nausea under control which led to not being able to administer ordered, scheduled medications.  Currently she is laying in bed, appears somewhat comfortable, significant other has brought her something from outside restaurant which she is going to attempt to eat.  Requests not to have on sequential teds right now but states she will try later (she is aware of risks for potential blood clots)  overall despite her chronic illness and fatigue, pt did cooperate with nursing and attempts were made to do what she could.

## 2025-04-23 NOTE — PROGRESS NOTES
"Batsheva Page is a 49 y.o. female on day 6 of admission presenting with Right foot infection.    Subjective   Symptoms (0 - 10, Best to Worst)  [place] Symptom Assessment System  0-10 (Numeric) Pain Score: 7  Patient is sitting up in bed shared today's nausea is much improved.  RLS pain ranges from 6-10, acceptable pain level 6.  Current pain level 8    Scheduled for hemodialysis this afternoon (MWF schedule)     Objective     Physical Exam  Vitals and nursing note reviewed.   HENT:      Head: Normocephalic and atraumatic.      Nose: Nose normal.      Mouth/Throat:      Mouth: Mucous membranes are dry.      Pharynx: Oropharynx is clear.   Eyes:      Extraocular Movements: Extraocular movements intact.      Conjunctiva/sclera: Conjunctivae normal.      Pupils: Pupils are equal, round, and reactive to light.   Cardiovascular:      Rate and Rhythm: Normal rate and regular rhythm.      Pulses: Normal pulses.      Heart sounds: Normal heart sounds.      Comments: R femoral HD catheter (to be removed)  Temporary L femoral HD catheter  Pulmonary:      Effort: Pulmonary effort is normal.      Breath sounds: Normal breath sounds.   Abdominal:      General: Abdomen is flat. Bowel sounds are normal.      Palpations: Abdomen is soft.   Genitourinary:     Comments: Anuric  Musculoskeletal:         General: Normal range of motion.      Cervical back: Neck supple.   Skin:     General: Skin is warm and dry.      Capillary Refill: Capillary refill takes less than 2 seconds.      Comments: Evidence of L & R AV fistula removal, scarring on bilateral arms   Neurological:      Mental Status: She is alert and oriented to person, place, and time.   Psychiatric:         Mood and Affect: Mood normal.         Behavior: Behavior normal.       Last Recorded Vitals  Blood pressure (!) 191/92, pulse 63, temperature 36.4 °C (97.5 °F), temperature source Temporal, resp. rate 16, height 1.727 m (5' 8\"), weight 73.4 kg (161 lb 13.1 oz), SpO2 " "93%.  Intake/Output last 3 Shifts:  I/O last 3 completed shifts:  In: 730.5 (10 mL/kg) [P.O.:480; IV Piggyback:250.5]  Out: - (0 mL/kg)   Weight: 73.4 kg     Relevant Results  Results for orders placed or performed during the hospital encounter of 04/17/25 (from the past 24 hours)   CBC   Result Value Ref Range    WBC 6.1 4.4 - 11.3 x10*3/uL    nRBC 0.0 0.0 - 0.0 /100 WBCs    RBC 3.06 (L) 4.00 - 5.20 x10*6/uL    Hemoglobin 9.5 (L) 12.0 - 16.0 g/dL    Hematocrit 29.6 (L) 36.0 - 46.0 %    MCV 97 80 - 100 fL    MCH 31.0 26.0 - 34.0 pg    MCHC 32.1 32.0 - 36.0 g/dL    RDW 16.6 (H) 11.5 - 14.5 %    Platelets 163 150 - 450 x10*3/uL   Renal Function Panel   Result Value Ref Range    Glucose 89 74 - 99 mg/dL    Sodium 137 136 - 145 mmol/L    Potassium 4.5 3.5 - 5.3 mmol/L    Chloride 96 (L) 98 - 107 mmol/L    Bicarbonate 27 21 - 32 mmol/L    Anion Gap 19 10 - 20 mmol/L    Urea Nitrogen 29 (H) 6 - 23 mg/dL    Creatinine 5.76 (H) 0.50 - 1.05 mg/dL    eGFR 8 (L) >60 mL/min/1.73m*2    Calcium 6.9 (L) 8.6 - 10.3 mg/dL    Phosphorus 5.8 (H) 2.5 - 4.9 mg/dL    Albumin 3.2 (L) 3.4 - 5.0 g/dL   Magnesium   Result Value Ref Range    Magnesium 1.94 1.60 - 2.40 mg/dL   Vancomycin   Result Value Ref Range    Vancomycin 28.6 (H) 5.0 - 20.0 ug/mL   hCG, quantitative, pregnancy   Result Value Ref Range    HCG, Beta-Quantitative 2 <5 mIU/mL         Scheduled medications  Scheduled Medications[1]  Continuous medications  Continuous Medications[2]  PRN medications  PRN Medications[3]     Assessment/Plan   R femoral dialysis catheter infection - Non-working dialysis catheter.  Patient has had a significant history of infected dialysis catheters, with multiple episodes of high-grade MRSA bacteria.  Most recently, patient treated with 5 mg/ml vancomycin \"dwell\" after each dialysis session.  Patient proactive in seeking ED evaluation (4/17/25), blood cultures negative. HD catheter replacement scheduled for Th, 4/24/25  ESRD on HD -  Dr. Epperson " "[place], Nephrologist - patient has been on HD since 1995 (20 years), at one point was on renal transplant list (\"unable to move forward due to multiple complications\").  Patient acknowledges that she is having a more difficult time with HD and wants to be able to make a \"decision regarding the right time to discontinue HD\"(likely within the next 3 to 6 months)  Coronary artery disease - [facility] cardiologist Dr. Robby Staples, Mitral valve ring with tricuspid valvuloplasty in 2013. No CABG. Redo median sternotomy for AVR and MVR bio-prostheic valve Fall 2022.  Developed endocarditis in 2022.  Patient has been advised to undergo an additional MV and AV replacement -but she has declined this.  12/2024 LAURA LVEF 65-70% w/normal systolic function is normal.St.Devonte bioprosthetic type mitral valve prosthesis, status post tricuspid valve repair, Inspirus bioprosthetic type aortic valve.  History of seizures-on Keppra 750 mg hs, seizure precautions.  No seizures during this hospital stay to date, 4/22.  Restless leg syndrome - [facility] Neurologist, Dr. Jarad Porter manages.  Home regimen: Percocet 5 mg/375 mg as needed; Inpatient regimen: IV Benadryl and Dilaudid.  Patient is also on Lyrica 50 mg twice daily for RLS/Omalley Ekbom syndrome/Chronic pain syndrome.  Dr. Porter has recommended consideration of methadone (outpatient), patient hesitant to begin methadone  Depression/Anxiety -self disclosed worsening depression related to poor physical function; recent loss of mother (11/24), who was a source of support, especially during hospital stays.   Patient has been receptive to psychiatry during this admission (Sterling REHMAN-CNP).  Remeron increased to 15 mg nightly (to help with mood/depression, sleep and appetite)  Palliative Care - GOC and discussion regarding patient's decision -discontinue HD     CODE STATUS -FULL CODE  Capable decision maker  No Advance Directives - does want to put these in place during this " "hospital stay  Daughter - Joy and Boyfriend/Partner - Chava are primary support  Currently in a disease modifying model of treatment    4/23/2025  Patient and daughter reviewed Advance Directive material and home palliative care program options.  Brie shared that she wants Chava, boyfriend to have input this evening (during his visit) - plan still to complete Advance Directive POAHC/Living Will while in hospital.  Shared that Palliative Medicine would be happy to initiate outpatient palliative medicine referral (patient to consider this).  Again, pt. shared her desire to \"weigh options/move past the anger she currently feels\"- elect to discontinue HD in the next 3-6 months.  Offered supportive listening and acknowledgment of patient's feelings.  No palliative related symptoms at present.  Palliative Medicine will follow over the next day.    I spent 41 non-continuous minutes in the professional and overall care of this patient.      Loree Klein, APRN-CNP           [1] aspirin, 81 mg, oral, Daily  atorvastatin, 40 mg, oral, Daily  calcitriol, 0.5 mcg, oral, Daily  cloNIDine, 0.2 mg, oral, q8h KIMBERLY  epoetin brandy-epbx, 100 Units/kg, subcutaneous, Once per day on Monday Wednesday Friday  ergocalciferol, 1.25 mg, oral, Every Sunday  heparin, 1,000 Units, intra-catheter, After Dialysis  heparin, 1,000 Units, intra-catheter, After Dialysis  levETIRAcetam, 750 mg, oral, Nightly  metoprolol tartrate, 25 mg, oral, BID  mirtazapine, 15 mg, oral, Nightly  mupirocin, , Topical, TID  piperacillin-tazobactam, 2.25 g, intravenous, q8h  pregabalin, 50 mg, oral, BID  vancomycin 5 mg/mL in NS lock, 2 mL, intra-catheter, Once  [2]    [3] PRN medications: acetaminophen, diphenhydrAMINE, hydrALAZINE, HYDROmorphone, hydrOXYzine HCL, [Held by provider] methocarbamol, oxyCODONE-acetaminophen, oxyCODONE-acetaminophen, promethazine, sodium chloride, vancomycin    "

## 2025-04-24 ENCOUNTER — ANESTHESIA EVENT (OUTPATIENT)
Dept: CARDIOLOGY | Facility: HOSPITAL | Age: 50
End: 2025-04-24
Payer: MEDICARE

## 2025-04-24 ENCOUNTER — ANESTHESIA (OUTPATIENT)
Dept: CARDIOLOGY | Facility: HOSPITAL | Age: 50
End: 2025-04-24
Payer: MEDICARE

## 2025-04-24 ENCOUNTER — APPOINTMENT (OUTPATIENT)
Dept: CARDIOLOGY | Facility: HOSPITAL | Age: 50
DRG: 919 | End: 2025-04-24
Payer: MEDICARE

## 2025-04-24 LAB
ALBUMIN SERPL BCP-MCNC: 3.4 G/DL (ref 3.4–5)
ANION GAP SERPL CALCULATED.3IONS-SCNC: 17 MMOL/L (ref 10–20)
BUN SERPL-MCNC: 23 MG/DL (ref 6–23)
CALCIUM SERPL-MCNC: 7.3 MG/DL (ref 8.6–10.3)
CHLORIDE SERPL-SCNC: 98 MMOL/L (ref 98–107)
CO2 SERPL-SCNC: 27 MMOL/L (ref 21–32)
CREAT SERPL-MCNC: 4.9 MG/DL (ref 0.5–1.05)
EGFRCR SERPLBLD CKD-EPI 2021: 10 ML/MIN/1.73M*2
ERYTHROCYTE [DISTWIDTH] IN BLOOD BY AUTOMATED COUNT: 16.7 % (ref 11.5–14.5)
GLUCOSE SERPL-MCNC: 87 MG/DL (ref 74–99)
HCT VFR BLD AUTO: 32.5 % (ref 36–46)
HGB BLD-MCNC: 10.2 G/DL (ref 12–16)
MAGNESIUM SERPL-MCNC: 1.93 MG/DL (ref 1.6–2.4)
MCH RBC QN AUTO: 30.3 PG (ref 26–34)
MCHC RBC AUTO-ENTMCNC: 31.4 G/DL (ref 32–36)
MCV RBC AUTO: 96 FL (ref 80–100)
NRBC BLD-RTO: 0 /100 WBCS (ref 0–0)
PHOSPHATE SERPL-MCNC: 4.4 MG/DL (ref 2.5–4.9)
PLATELET # BLD AUTO: 194 X10*3/UL (ref 150–450)
POTASSIUM SERPL-SCNC: 4.4 MMOL/L (ref 3.5–5.3)
RBC # BLD AUTO: 3.37 X10*6/UL (ref 4–5.2)
SODIUM SERPL-SCNC: 138 MMOL/L (ref 136–145)
WBC # BLD AUTO: 6.1 X10*3/UL (ref 4.4–11.3)

## 2025-04-24 PROCEDURE — 2500000004 HC RX 250 GENERAL PHARMACY W/ HCPCS (ALT 636 FOR OP/ED): Mod: JZ | Performed by: NURSE PRACTITIONER

## 2025-04-24 PROCEDURE — 36581 REPLACE TUNNELED CV CATH: CPT | Mod: RT

## 2025-04-24 PROCEDURE — 2500000001 HC RX 250 WO HCPCS SELF ADMINISTERED DRUGS (ALT 637 FOR MEDICARE OP): Performed by: INTERNAL MEDICINE

## 2025-04-24 PROCEDURE — 99232 SBSQ HOSP IP/OBS MODERATE 35: CPT | Performed by: NURSE PRACTITIONER

## 2025-04-24 PROCEDURE — 2500000004 HC RX 250 GENERAL PHARMACY W/ HCPCS (ALT 636 FOR OP/ED): Performed by: NURSE PRACTITIONER

## 2025-04-24 PROCEDURE — 7100000001 HC RECOVERY ROOM TIME - INITIAL BASE CHARGE

## 2025-04-24 PROCEDURE — 2500000004 HC RX 250 GENERAL PHARMACY W/ HCPCS (ALT 636 FOR OP/ED): Performed by: ANESTHESIOLOGIST ASSISTANT

## 2025-04-24 PROCEDURE — 80069 RENAL FUNCTION PANEL: CPT | Performed by: NURSE PRACTITIONER

## 2025-04-24 PROCEDURE — 85027 COMPLETE CBC AUTOMATED: CPT | Performed by: NURSE PRACTITIONER

## 2025-04-24 PROCEDURE — 36581 REPLACE TUNNELED CV CATH: CPT | Mod: RT | Performed by: RADIOLOGY

## 2025-04-24 PROCEDURE — 83735 ASSAY OF MAGNESIUM: CPT | Performed by: NURSE PRACTITIONER

## 2025-04-24 PROCEDURE — C1894 INTRO/SHEATH, NON-LASER: HCPCS

## 2025-04-24 PROCEDURE — 0J2TXYZ CHANGE OTHER DEVICE IN TRUNK SUBCUTANEOUS TISSUE AND FASCIA, EXTERNAL APPROACH: ICD-10-PCS | Performed by: RADIOLOGY

## 2025-04-24 PROCEDURE — 2500000004 HC RX 250 GENERAL PHARMACY W/ HCPCS (ALT 636 FOR OP/ED): Mod: JZ | Performed by: INTERNAL MEDICINE

## 2025-04-24 PROCEDURE — 36581 REPLACE TUNNELED CV CATH: CPT | Performed by: RADIOLOGY

## 2025-04-24 PROCEDURE — 2720000007 HC OR 272 NO HCPCS

## 2025-04-24 PROCEDURE — C1769 GUIDE WIRE: HCPCS

## 2025-04-24 PROCEDURE — 3700000002 HC GENERAL ANESTHESIA TIME - EACH INCREMENTAL 1 MINUTE

## 2025-04-24 PROCEDURE — 2500000001 HC RX 250 WO HCPCS SELF ADMINISTERED DRUGS (ALT 637 FOR MEDICARE OP): Performed by: NURSE PRACTITIONER

## 2025-04-24 PROCEDURE — 2060000001 HC INTERMEDIATE ICU ROOM DAILY

## 2025-04-24 PROCEDURE — 2500000001 HC RX 250 WO HCPCS SELF ADMINISTERED DRUGS (ALT 637 FOR MEDICARE OP)

## 2025-04-24 PROCEDURE — 7100000002 HC RECOVERY ROOM TIME - EACH INCREMENTAL 1 MINUTE

## 2025-04-24 PROCEDURE — 77001 FLUOROGUIDE FOR VEIN DEVICE: CPT | Performed by: RADIOLOGY

## 2025-04-24 PROCEDURE — 3700000001 HC GENERAL ANESTHESIA TIME - INITIAL BASE CHARGE

## 2025-04-24 PROCEDURE — 36415 COLL VENOUS BLD VENIPUNCTURE: CPT | Performed by: NURSE PRACTITIONER

## 2025-04-24 PROCEDURE — 2500000004 HC RX 250 GENERAL PHARMACY W/ HCPCS (ALT 636 FOR OP/ED): Performed by: RADIOLOGY

## 2025-04-24 RX ORDER — HEPARIN SODIUM 1000 [USP'U]/ML
1000 INJECTION, SOLUTION INTRAVENOUS; SUBCUTANEOUS
Status: CANCELLED | OUTPATIENT
Start: 2025-04-25

## 2025-04-24 RX ORDER — LIDOCAINE HYDROCHLORIDE 10 MG/ML
INJECTION, SOLUTION EPIDURAL; INFILTRATION; INTRACAUDAL; PERINEURAL AS NEEDED
Status: DISCONTINUED | OUTPATIENT
Start: 2025-04-24 | End: 2025-04-24 | Stop reason: HOSPADM

## 2025-04-24 RX ORDER — ONDANSETRON HYDROCHLORIDE 2 MG/ML
4 INJECTION, SOLUTION INTRAVENOUS ONCE AS NEEDED
Status: COMPLETED | OUTPATIENT
Start: 2025-04-24 | End: 2025-04-25

## 2025-04-24 RX ORDER — SODIUM CHLORIDE, SODIUM LACTATE, POTASSIUM CHLORIDE, CALCIUM CHLORIDE 600; 310; 30; 20 MG/100ML; MG/100ML; MG/100ML; MG/100ML
100 INJECTION, SOLUTION INTRAVENOUS CONTINUOUS
Status: DISCONTINUED | OUTPATIENT
Start: 2025-04-24 | End: 2025-04-27

## 2025-04-24 RX ORDER — DEXMEDETOMIDINE HYDROCHLORIDE 100 UG/ML
INJECTION, SOLUTION INTRAVENOUS AS NEEDED
Status: DISCONTINUED | OUTPATIENT
Start: 2025-04-24 | End: 2025-04-24

## 2025-04-24 RX ORDER — LIDOCAINE HYDROCHLORIDE 10 MG/ML
0.1 INJECTION, SOLUTION INFILTRATION; PERINEURAL ONCE
Status: DISCONTINUED | OUTPATIENT
Start: 2025-04-24 | End: 2025-04-27 | Stop reason: HOSPADM

## 2025-04-24 RX ORDER — FENTANYL CITRATE 50 UG/ML
50 INJECTION, SOLUTION INTRAMUSCULAR; INTRAVENOUS EVERY 5 MIN PRN
Status: DISCONTINUED | OUTPATIENT
Start: 2025-04-24 | End: 2025-04-26

## 2025-04-24 RX ORDER — HYDRALAZINE HYDROCHLORIDE 20 MG/ML
5 INJECTION INTRAMUSCULAR; INTRAVENOUS EVERY 30 MIN PRN
Status: DISCONTINUED | OUTPATIENT
Start: 2025-04-24 | End: 2025-04-26

## 2025-04-24 RX ORDER — KETAMINE HYDROCHLORIDE 50 MG/ML
INJECTION, SOLUTION INTRAMUSCULAR; INTRAVENOUS AS NEEDED
Status: DISCONTINUED | OUTPATIENT
Start: 2025-04-24 | End: 2025-04-24

## 2025-04-24 RX ORDER — PROPOFOL 10 MG/ML
INJECTION, EMULSION INTRAVENOUS AS NEEDED
Status: DISCONTINUED | OUTPATIENT
Start: 2025-04-24 | End: 2025-04-24

## 2025-04-24 RX ORDER — MIDAZOLAM HYDROCHLORIDE 1 MG/ML
1 INJECTION, SOLUTION INTRAMUSCULAR; INTRAVENOUS ONCE AS NEEDED
Status: DISCONTINUED | OUTPATIENT
Start: 2025-04-24 | End: 2025-04-26

## 2025-04-24 RX ORDER — GLYCOPYRROLATE 0.2 MG/ML
INJECTION INTRAMUSCULAR; INTRAVENOUS AS NEEDED
Status: DISCONTINUED | OUTPATIENT
Start: 2025-04-24 | End: 2025-04-24

## 2025-04-24 RX ORDER — ALBUTEROL SULFATE 0.83 MG/ML
2.5 SOLUTION RESPIRATORY (INHALATION) ONCE AS NEEDED
Status: DISCONTINUED | OUTPATIENT
Start: 2025-04-24 | End: 2025-04-27 | Stop reason: HOSPADM

## 2025-04-24 RX ORDER — MIDAZOLAM HYDROCHLORIDE 1 MG/ML
INJECTION, SOLUTION INTRAMUSCULAR; INTRAVENOUS AS NEEDED
Status: DISCONTINUED | OUTPATIENT
Start: 2025-04-24 | End: 2025-04-24

## 2025-04-24 RX ORDER — FENTANYL CITRATE 50 UG/ML
INJECTION, SOLUTION INTRAMUSCULAR; INTRAVENOUS AS NEEDED
Status: DISCONTINUED | OUTPATIENT
Start: 2025-04-24 | End: 2025-04-24

## 2025-04-24 RX ORDER — HEPARIN SODIUM 1000 [USP'U]/ML
INJECTION, SOLUTION INTRAVENOUS; SUBCUTANEOUS AS NEEDED
Status: DISCONTINUED | OUTPATIENT
Start: 2025-04-24 | End: 2025-04-24 | Stop reason: HOSPADM

## 2025-04-24 RX ADMIN — DIPHENHYDRAMINE HYDROCHLORIDE 50 MG: 50 INJECTION, SOLUTION INTRAMUSCULAR; INTRAVENOUS at 12:33

## 2025-04-24 RX ADMIN — HYDRALAZINE HYDROCHLORIDE 5 MG: 20 INJECTION INTRAMUSCULAR; INTRAVENOUS at 12:33

## 2025-04-24 RX ADMIN — DEXMEDETOMIDINE HCL 20 MCG: 100 INJECTION INTRAVENOUS at 16:28

## 2025-04-24 RX ADMIN — GLYCOPYRROLATE 0.2 MG: 0.2 INJECTION INTRAMUSCULAR; INTRAVENOUS at 15:53

## 2025-04-24 RX ADMIN — HYDROMORPHONE HYDROCHLORIDE 0.6 MG: 1 INJECTION, SOLUTION INTRAMUSCULAR; INTRAVENOUS; SUBCUTANEOUS at 21:22

## 2025-04-24 RX ADMIN — MIRTAZAPINE 15 MG: 15 TABLET, FILM COATED ORAL at 21:14

## 2025-04-24 RX ADMIN — DEXMEDETOMIDINE HCL 20 MCG: 100 INJECTION INTRAVENOUS at 15:54

## 2025-04-24 RX ADMIN — FENTANYL CITRATE 100 MCG: 50 INJECTION, SOLUTION INTRAMUSCULAR; INTRAVENOUS at 15:53

## 2025-04-24 RX ADMIN — DEXMEDETOMIDINE HCL 20 MCG: 100 INJECTION INTRAVENOUS at 16:01

## 2025-04-24 RX ADMIN — LIDOCAINE HYDROCHLORIDE 5 ML: 10 INJECTION, SOLUTION EPIDURAL; INFILTRATION; INTRACAUDAL; PERINEURAL at 16:19

## 2025-04-24 RX ADMIN — PIPERACILLIN SODIUM AND TAZOBACTAM SODIUM 2.25 G: 2; .25 INJECTION, SOLUTION INTRAVENOUS at 21:14

## 2025-04-24 RX ADMIN — DIPHENHYDRAMINE HYDROCHLORIDE 50 MG: 50 INJECTION, SOLUTION INTRAMUSCULAR; INTRAVENOUS at 06:14

## 2025-04-24 RX ADMIN — SODIUM CHLORIDE: 9 INJECTION, SOLUTION INTRAVENOUS at 15:45

## 2025-04-24 RX ADMIN — HYDROMORPHONE HYDROCHLORIDE 0.6 MG: 1 INJECTION, SOLUTION INTRAMUSCULAR; INTRAVENOUS; SUBCUTANEOUS at 12:33

## 2025-04-24 RX ADMIN — LEVETIRACETAM 750 MG: 500 TABLET, FILM COATED ORAL at 21:14

## 2025-04-24 RX ADMIN — DIPHENHYDRAMINE HYDROCHLORIDE 50 MG: 50 INJECTION, SOLUTION INTRAMUSCULAR; INTRAVENOUS at 21:22

## 2025-04-24 RX ADMIN — KETAMINE HYDROCHLORIDE 50 MG: 50 INJECTION INTRAMUSCULAR; INTRAVENOUS at 15:58

## 2025-04-24 RX ADMIN — HYDROXYZINE HYDROCHLORIDE 25 MG: 25 TABLET, FILM COATED ORAL at 08:31

## 2025-04-24 RX ADMIN — HYDROMORPHONE HYDROCHLORIDE 0.6 MG: 1 INJECTION, SOLUTION INTRAMUSCULAR; INTRAVENOUS; SUBCUTANEOUS at 02:58

## 2025-04-24 RX ADMIN — HYDROMORPHONE HYDROCHLORIDE 0.6 MG: 1 INJECTION, SOLUTION INTRAMUSCULAR; INTRAVENOUS; SUBCUTANEOUS at 06:14

## 2025-04-24 RX ADMIN — CLONIDINE HYDROCHLORIDE 0.2 MG: 0.2 TABLET ORAL at 05:04

## 2025-04-24 RX ADMIN — HYDRALAZINE HYDROCHLORIDE 5 MG: 20 INJECTION INTRAMUSCULAR; INTRAVENOUS at 23:20

## 2025-04-24 RX ADMIN — DIPHENHYDRAMINE HYDROCHLORIDE 50 MG: 50 INJECTION, SOLUTION INTRAMUSCULAR; INTRAVENOUS at 02:58

## 2025-04-24 RX ADMIN — PROPOFOL 50 MCG/KG/MIN: 10 INJECTION, EMULSION INTRAVENOUS at 16:07

## 2025-04-24 RX ADMIN — MIDAZOLAM 2 MG: 1 INJECTION INTRAMUSCULAR; INTRAVENOUS at 15:53

## 2025-04-24 RX ADMIN — METOPROLOL TARTRATE 25 MG: 25 TABLET, FILM COATED ORAL at 08:31

## 2025-04-24 RX ADMIN — CLONIDINE HYDROCHLORIDE 0.2 MG: 0.2 TABLET ORAL at 21:14

## 2025-04-24 RX ADMIN — DIPHENHYDRAMINE HYDROCHLORIDE 50 MG: 50 INJECTION, SOLUTION INTRAMUSCULAR; INTRAVENOUS at 09:34

## 2025-04-24 RX ADMIN — HYDRALAZINE HYDROCHLORIDE 5 MG: 20 INJECTION INTRAMUSCULAR; INTRAVENOUS at 05:04

## 2025-04-24 RX ADMIN — HYDROMORPHONE HYDROCHLORIDE 0.6 MG: 1 INJECTION, SOLUTION INTRAMUSCULAR; INTRAVENOUS; SUBCUTANEOUS at 09:33

## 2025-04-24 RX ADMIN — KETAMINE HYDROCHLORIDE 50 MG: 50 INJECTION INTRAMUSCULAR; INTRAVENOUS at 16:03

## 2025-04-24 RX ADMIN — PIPERACILLIN SODIUM AND TAZOBACTAM SODIUM 2.25 G: 2; .25 INJECTION, SOLUTION INTRAVENOUS at 05:04

## 2025-04-24 RX ADMIN — METOPROLOL TARTRATE 25 MG: 25 TABLET, FILM COATED ORAL at 21:14

## 2025-04-24 RX ADMIN — HEPARIN SODIUM 5200 UNITS: 1000 INJECTION INTRAVENOUS; SUBCUTANEOUS at 16:39

## 2025-04-24 RX ADMIN — KETAMINE HYDROCHLORIDE 50 MG: 50 INJECTION INTRAMUSCULAR; INTRAVENOUS at 16:07

## 2025-04-24 RX ADMIN — PROPOFOL 50 MG: 10 INJECTION, EMULSION INTRAVENOUS at 16:06

## 2025-04-24 RX ADMIN — CLONIDINE HYDROCHLORIDE 0.2 MG: 0.2 TABLET ORAL at 13:55

## 2025-04-24 SDOH — HEALTH STABILITY: MENTAL HEALTH: CURRENT SMOKER: 0

## 2025-04-24 ASSESSMENT — COGNITIVE AND FUNCTIONAL STATUS - GENERAL
STANDING UP FROM CHAIR USING ARMS: A LITTLE
WALKING IN HOSPITAL ROOM: A LOT
PERSONAL GROOMING: A LITTLE
DRESSING REGULAR LOWER BODY CLOTHING: A LITTLE
TOILETING: A LITTLE
HELP NEEDED FOR BATHING: A LITTLE
MOVING TO AND FROM BED TO CHAIR: A LITTLE
STANDING UP FROM CHAIR USING ARMS: A LITTLE
MOVING TO AND FROM BED TO CHAIR: A LITTLE
WALKING IN HOSPITAL ROOM: A LOT
HELP NEEDED FOR BATHING: A LITTLE
DRESSING REGULAR UPPER BODY CLOTHING: A LITTLE
CLIMB 3 TO 5 STEPS WITH RAILING: A LOT
DRESSING REGULAR LOWER BODY CLOTHING: A LITTLE
DAILY ACTIVITIY SCORE: 19
MOBILITY SCORE: 17
DRESSING REGULAR UPPER BODY CLOTHING: A LITTLE
DAILY ACTIVITIY SCORE: 20
TURNING FROM BACK TO SIDE WHILE IN FLAT BAD: A LITTLE
CLIMB 3 TO 5 STEPS WITH RAILING: A LOT
TOILETING: A LITTLE
MOBILITY SCORE: 18

## 2025-04-24 ASSESSMENT — PAIN SCALES - GENERAL
PAINLEVEL_OUTOF10: 10 - WORST POSSIBLE PAIN
PAINLEVEL_OUTOF10: 0 - NO PAIN
PAIN_LEVEL: 2
PAINLEVEL_OUTOF10: 10 - WORST POSSIBLE PAIN
PAINLEVEL_OUTOF10: 10 - WORST POSSIBLE PAIN
PAINLEVEL_OUTOF10: 0 - NO PAIN
PAINLEVEL_OUTOF10: 10 - WORST POSSIBLE PAIN
PAINLEVEL_OUTOF10: 7

## 2025-04-24 ASSESSMENT — PAIN DESCRIPTION - ORIENTATION
ORIENTATION: RIGHT;LEFT
ORIENTATION: RIGHT;LEFT

## 2025-04-24 ASSESSMENT — PAIN - FUNCTIONAL ASSESSMENT
PAIN_FUNCTIONAL_ASSESSMENT: 0-10
PAIN_FUNCTIONAL_ASSESSMENT: FLACC (FACE, LEGS, ACTIVITY, CRY, CONSOLABILITY)
PAIN_FUNCTIONAL_ASSESSMENT: FLACC (FACE, LEGS, ACTIVITY, CRY, CONSOLABILITY)
PAIN_FUNCTIONAL_ASSESSMENT: 0-10
PAIN_FUNCTIONAL_ASSESSMENT: FLACC (FACE, LEGS, ACTIVITY, CRY, CONSOLABILITY)

## 2025-04-24 ASSESSMENT — PAIN DESCRIPTION - DESCRIPTORS
DESCRIPTORS: SHARP

## 2025-04-24 ASSESSMENT — PAIN DESCRIPTION - LOCATION
LOCATION: GROIN
LOCATION: GENERALIZED
LOCATION: LEG

## 2025-04-24 ASSESSMENT — PAIN SCALES - WONG BAKER: WONGBAKER_NUMERICALRESPONSE: NO HURT

## 2025-04-24 NOTE — PROGRESS NOTES
"Batsheva Page is a 49 y.o. female on day 7 of admission presenting with Right foot infection.    Subjective   Patient is seen for end-stage kidney disease and hyperkalemia admitted with malfunctioning right femoral permacath he was dialyzed yesterday however since she shorten her treatment no overnight events noted and clinically stable       Objective     Physical Exam  Constitutional:       General: She is not in acute distress.     Appearance: Normal appearance. She is not toxic-appearing.   Neck:      Vascular: No carotid bruit.   Cardiovascular:      Heart sounds: No murmur heard.     No friction rub. No gallop.   Pulmonary:      Breath sounds: No wheezing, rhonchi or rales.   Abdominal:      Tenderness: There is no abdominal tenderness. There is no right CVA tenderness, left CVA tenderness or rebound.   Musculoskeletal:         General: No tenderness.      Cervical back: Neck supple.      Right lower leg: No edema.      Left lower leg: No edema.   Lymphadenopathy:      Cervical: No cervical adenopathy.         Last Recorded Vitals  Blood pressure (!) 135/93, pulse 79, temperature 36.7 °C (98.1 °F), temperature source Oral, resp. rate 16, height 1.727 m (5' 8\"), weight 72.4 kg (159 lb 9.8 oz), SpO2 99%.    Intake/Output last 3 Shifts:  I/O last 3 completed shifts:  In: 1700 (23.5 mL/kg) [P.O.:700; I.V.:400 (5.5 mL/kg); Other:400; IV Piggyback:200]  Out: 1400 (19.3 mL/kg) [Other:1400]  Weight: 72.4 kg   Current Medications[1]   Relevant Results    Results for orders placed or performed during the hospital encounter of 04/17/25 (from the past 96 hours)   CBC   Result Value Ref Range    WBC 8.1 4.4 - 11.3 x10*3/uL    nRBC 0.0 0.0 - 0.0 /100 WBCs    RBC 3.30 (L) 4.00 - 5.20 x10*6/uL    Hemoglobin 10.1 (L) 12.0 - 16.0 g/dL    Hematocrit 32.4 (L) 36.0 - 46.0 %    MCV 98 80 - 100 fL    MCH 30.6 26.0 - 34.0 pg    MCHC 31.2 (L) 32.0 - 36.0 g/dL    RDW 16.7 (H) 11.5 - 14.5 %    Platelets 145 (L) 150 - 450 x10*3/uL " Physical Therapy Visit    Visit Type: Daily Treatment Note  Visit: 2  Referring Provider: Niurka Jeffries CNP  Medical Diagnosis (from order): M54.16 - Lumbar radiculopathy  M48.061 - Spinal stenosis of lumbar region   Chart reviewed at time of initial evaluation (relevant co-morbidities, allergies, tests and medications listed):   Past Medical History:  No date: Arthritis  No date: DM2 (diabetes mellitus, type 2)  (CMD)  No date: Glaucoma  No date: HTN (hypertension)  No date: Irregular heartbeat  No date: Peripheral neuropathy        SUBJECTIVE                                                                                                               Visit 2    Pain 0/10   \"I have been doing good with my back, but when I got sick I got set back a little bit.\"  ---------------------------------------------------------------------------------------------------------  Lumbar pain which refers into bilat feet described as sharp.  Patient also has intermittent cervical & upper thoracic pain which she has been seen & is being monitored for.  Pain is chronic.  Pain ranges from 6-10/10.  Patient currently reports difficulty w running a lot of errands, cleaning, cooking & other tasks.  Patient reports having to take a lot of breaks.  Patient does not work at this time.  Patient is right hand dominant.      Patient Goals: decreased pain.      OBJECTIVE                                                                                                                                       Treatment     Neuromuscular Re-Education  - Lower Trunk Rotation (x 5)   - Pelvic Tilt (3 x 10)  - Straight Leg Raise (RLE 2 x 6, LLE 2 x 10)   - Bridges (2 x 10)  - Supine Hip Adductor Ball Squeeze (3 x 10 w 3 second hold ea)   - Sidelying Hip Abduction (2 x 12)   - HEP Reviewed     Skilled input: verbal instruction/cues    Writer verbally educated and received verbal consent for hand placement, positioning of patient, and techniques to be    Renal Function Panel   Result Value Ref Range    Glucose 97 74 - 99 mg/dL    Sodium 136 136 - 145 mmol/L    Potassium 5.3 3.5 - 5.3 mmol/L    Chloride 97 (L) 98 - 107 mmol/L    Bicarbonate 24 21 - 32 mmol/L    Anion Gap 20 10 - 20 mmol/L    Urea Nitrogen 31 (H) 6 - 23 mg/dL    Creatinine 5.58 (H) 0.50 - 1.05 mg/dL    eGFR 9 (L) >60 mL/min/1.73m*2    Calcium 7.0 (L) 8.6 - 10.3 mg/dL    Phosphorus 5.5 (H) 2.5 - 4.9 mg/dL    Albumin 3.3 (L) 3.4 - 5.0 g/dL   Magnesium   Result Value Ref Range    Magnesium 1.74 1.60 - 2.40 mg/dL   Vancomycin   Result Value Ref Range    Vancomycin 42.6 (H) 5.0 - 20.0 ug/mL   Protime-INR   Result Value Ref Range    Protime 11.7 9.3 - 12.7 seconds    INR 1.1 0.9 - 1.2   CBC   Result Value Ref Range    WBC 5.4 4.4 - 11.3 x10*3/uL    nRBC 0.0 0.0 - 0.0 /100 WBCs    RBC 3.26 (L) 4.00 - 5.20 x10*6/uL    Hemoglobin 10.0 (L) 12.0 - 16.0 g/dL    Hematocrit 31.4 (L) 36.0 - 46.0 %    MCV 96 80 - 100 fL    MCH 30.7 26.0 - 34.0 pg    MCHC 31.8 (L) 32.0 - 36.0 g/dL    RDW 16.5 (H) 11.5 - 14.5 %    Platelets 144 (L) 150 - 450 x10*3/uL   Renal Function Panel   Result Value Ref Range    Glucose 99 74 - 99 mg/dL    Sodium 137 136 - 145 mmol/L    Potassium 4.4 3.5 - 5.3 mmol/L    Chloride 95 (L) 98 - 107 mmol/L    Bicarbonate 29 21 - 32 mmol/L    Anion Gap 17 10 - 20 mmol/L    Urea Nitrogen 17 6 - 23 mg/dL    Creatinine 3.88 (H) 0.50 - 1.05 mg/dL    eGFR 14 (L) >60 mL/min/1.73m*2    Calcium 7.5 (L) 8.6 - 10.3 mg/dL    Phosphorus 4.8 2.5 - 4.9 mg/dL    Albumin 3.3 (L) 3.4 - 5.0 g/dL   Magnesium   Result Value Ref Range    Magnesium 1.84 1.60 - 2.40 mg/dL   Vancomycin   Result Value Ref Range    Vancomycin 31.8 (H) 5.0 - 20.0 ug/mL   CBC   Result Value Ref Range    WBC 6.1 4.4 - 11.3 x10*3/uL    nRBC 0.0 0.0 - 0.0 /100 WBCs    RBC 3.06 (L) 4.00 - 5.20 x10*6/uL    Hemoglobin 9.5 (L) 12.0 - 16.0 g/dL    Hematocrit 29.6 (L) 36.0 - 46.0 %    MCV 97 80 - 100 fL    MCH 31.0 26.0 - 34.0 pg    MCHC 32.1 32.0 - 36.0  performed today from patient   Home Exercise Program  Access Code: [email or web address]  URL: [email or web address]  Date: 01/30/2025  Prepared by: Benito Baker, PT, DPT, Cert. MDT, GTS    Program Notes  Princess Stiven Kaur    Exercises  - Seated Cervical Retraction and Extension  - 5 x daily - 2 sets - 10 reps  - Seated Scapular Retraction  - 2 x daily - 3 sets - 10 reps - 3 hold  - Seated Shoulder Shrug Circles AROM Backward  - 1 x daily - 3 sets - 10 reps  - Seated Shoulder Flexion Full Range  - 1 x daily - 3 sets - 10 reps  - Seated Shoulder Abduction Full Range  - 1 x daily - 3 sets - 10 reps  - Doorway Pec Stretch at 60 Elevation  - 2 x daily - 1 sets - 6 reps - 15 hold  - Doorway Pec Stretch at 90 Degrees Abduction  - 2 x daily - 1 sets - 6 reps - 15 hold  - Doorway Pec Stretch at 120 Degrees Abduction  - 1 x daily - 3 sets - 10 reps  - Corner Pec Minor Stretch  - 2 x daily - 1 sets - 6 reps - 15 hold  - Seated Upper Trapezius Stretch  - 2 x daily - 1 sets - 6 reps - 10 hold  - Seated Levator Scapulae Stretch  - 2 x daily - 1 sets - 6 reps - 10 hold  - Supine Lower Trunk Rotation  - 2 x daily - 2 sets - 10 reps - 4 hold  - Hooklying Single Knee to Chest Stretch with Towel  - 2 x daily - 1 sets - 8 reps - 10 hold  - Bent Knee Fallouts  - 2 x daily - 3 sets - 10 reps - 3 sec hold  - Prone Press Up on Elbows (Rep - Prone Lying in Ext)  - 5 x daily - 2 sets - 10 reps  - Supine Pelvic Tilt  - 1 x daily - 3 sets - 10 reps  - Supine Bridge  - 1 x daily - 3 sets - 10 reps  - Active Straight Leg Raise with Quad Set  - 1 x daily - 3 sets - 12 reps  - Sidelying Hip Abduction  - 1 x daily - 3 sets - 12 reps      ASSESSMENT                                                                                                            Patient missed last appt d/t being ill.  Patient cont to have some nausea though wanted to try.  Exercises performed in supine & sidelying only as patient unable to lay prone d/t illness.   Patient demo good tolerance for session today though requiring increased time to perform exercises d/t illness.  Patient demo impaired strength in BLE's R > L w mod fatigue during hip strengthening exercises.  Patient reports improved strain in low back as exercises progressed.  Education:   - Results of above outlined education: Verbalizes understanding    PLAN                                                                                                                           Suggestions for next session as indicated: Progress per plan of care       Therapy procedure time and total treatment time can be found documented on the Time Entry flowsheet     g/dL    RDW 16.6 (H) 11.5 - 14.5 %    Platelets 163 150 - 450 x10*3/uL   Renal Function Panel   Result Value Ref Range    Glucose 89 74 - 99 mg/dL    Sodium 137 136 - 145 mmol/L    Potassium 4.5 3.5 - 5.3 mmol/L    Chloride 96 (L) 98 - 107 mmol/L    Bicarbonate 27 21 - 32 mmol/L    Anion Gap 19 10 - 20 mmol/L    Urea Nitrogen 29 (H) 6 - 23 mg/dL    Creatinine 5.76 (H) 0.50 - 1.05 mg/dL    eGFR 8 (L) >60 mL/min/1.73m*2    Calcium 6.9 (L) 8.6 - 10.3 mg/dL    Phosphorus 5.8 (H) 2.5 - 4.9 mg/dL    Albumin 3.2 (L) 3.4 - 5.0 g/dL   Magnesium   Result Value Ref Range    Magnesium 1.94 1.60 - 2.40 mg/dL   Vancomycin   Result Value Ref Range    Vancomycin 28.6 (H) 5.0 - 20.0 ug/mL   hCG, quantitative, pregnancy   Result Value Ref Range    HCG, Beta-Quantitative 2 <5 mIU/mL   CBC   Result Value Ref Range    WBC 6.1 4.4 - 11.3 x10*3/uL    nRBC 0.0 0.0 - 0.0 /100 WBCs    RBC 3.37 (L) 4.00 - 5.20 x10*6/uL    Hemoglobin 10.2 (L) 12.0 - 16.0 g/dL    Hematocrit 32.5 (L) 36.0 - 46.0 %    MCV 96 80 - 100 fL    MCH 30.3 26.0 - 34.0 pg    MCHC 31.4 (L) 32.0 - 36.0 g/dL    RDW 16.7 (H) 11.5 - 14.5 %    Platelets 194 150 - 450 x10*3/uL   Renal Function Panel   Result Value Ref Range    Glucose 87 74 - 99 mg/dL    Sodium 138 136 - 145 mmol/L    Potassium 4.4 3.5 - 5.3 mmol/L    Chloride 98 98 - 107 mmol/L    Bicarbonate 27 21 - 32 mmol/L    Anion Gap 17 10 - 20 mmol/L    Urea Nitrogen 23 6 - 23 mg/dL    Creatinine 4.90 (H) 0.50 - 1.05 mg/dL    eGFR 10 (L) >60 mL/min/1.73m*2    Calcium 7.3 (L) 8.6 - 10.3 mg/dL    Phosphorus 4.4 2.5 - 4.9 mg/dL    Albumin 3.4 3.4 - 5.0 g/dL   Magnesium   Result Value Ref Range    Magnesium 1.93 1.60 - 2.40 mg/dL         Assessment & Plan:     End-stage renal disease and dialysis therapy my plan is to dialyze her tomorrow morning  Hyperkalemia solved  Infection of dialysis catheter site and malfunctioning dialysis catheter- IR consulted for tunneled dialysis catheter exchange today    Zhou Pedersen MD          [1]   Current Facility-Administered Medications:     acetaminophen (Tylenol) tablet 650 mg, 650 mg, oral, q6h PRN, ORI Kincaid-CNP    aspirin EC tablet 81 mg, 81 mg, oral, Daily, ORI Kincaid-CNP, 81 mg at 04/20/25 0814    atorvastatin (Lipitor) tablet 40 mg, 40 mg, oral, Daily, ORI Kincaid-CNP, 40 mg at 04/20/25 0814    calcitriol (Rocaltrol) capsule 0.5 mcg, 0.5 mcg, oral, Daily, ORI Kincaid-CNP, 0.5 mcg at 04/20/25 0814    cloNIDine (Catapres) tablet 0.2 mg, 0.2 mg, oral, q8h KIMBERLY, Zhou Pedersen MD, 0.2 mg at 04/24/25 0504    diphenhydrAMINE (BENADryl) injection 50 mg, 50 mg, intravenous, q3h PRN, ORI Kincaid-CNP, 50 mg at 04/24/25 0614    epoetin brandy-epbx (Retacrit) injection 6,440 Units, 100 Units/kg, subcutaneous, Once per day on Monday Wednesday Friday, ORI Kincaid-CNP, 6,440 Units at 04/23/25 1803    ergocalciferol (Vitamin D-2) capsule 1.25 mg, 1.25 mg, oral, Every Sunday, ORI Kincaid-CNP, 1.25 mg at 04/20/25 0814    heparin 1,000 unit/mL injection 1,000 Units, 1,000 Units, intra-catheter, After Dialysis, Josr Asif APRN-CNP    heparin 1,000 unit/mL injection 1,000 Units, 1,000 Units, intra-catheter, After Dialysis, ORI Cheatham-CNP, 1,000 Units at 04/23/25 1600    hydrALAZINE (Apresoline) injection 5 mg, 5 mg, intravenous, q6h PRN, ORI Huynh-CNP, 5 mg at 04/24/25 0504    HYDROmorphone (Dilaudid) injection 0.6 mg, 0.6 mg, intravenous, q3h PRN, Noelle Doss, APRN-CNP, 0.6 mg at 04/24/25 0614    hydrOXYzine HCL (Atarax) tablet 25 mg, 25 mg, oral, q6h PRN, ORI Michael-CNP, 25 mg at 04/24/25 0831    levETIRAcetam (Keppra) tablet 750 mg, 750 mg, oral, Nightly, ORI Kincaid-CNP, 750 mg at 04/18/25 2125    [Held by provider] methocarbamol (Robaxin) tablet 750 mg, 750 mg, oral, q6h PRN, Gloria Carrion PA-C    metoprolol tartrate (Lopressor) tablet 25 mg, 25 mg, oral, BID, Noelle NAVA  ORI Doss-CNP, 25 mg at 04/24/25 0831    mirtazapine (Remeron) tablet 15 mg, 15 mg, oral, Nightly, Sterling AsifORI-CNP, 15 mg at 04/23/25 2001    mupirocin (Bactroban) 2 % ointment, , Topical, TID, ANGELA Huynh, Given at 04/23/25 2000    oxyCODONE-acetaminophen (Percocet)  mg per tablet 1 tablet, 1 tablet, oral, q6h PRN, ORI Kincaid-CNP, 1 tablet at 04/18/25 0632    oxyCODONE-acetaminophen (Percocet) 5-325 mg per tablet 1 tablet, 1 tablet, oral, q6h PRN, ORI Kincaid-CNP    piperacillin-tazobactam (Zosyn) 2.25 g in dextrose (iso) IV 50 mL, 2.25 g, intravenous, q8h, ANGELA Kincaid, Stopped at 04/24/25 0508    pregabalin (Lyrica) capsule 50 mg, 50 mg, oral, BID, ANGELA Kincaid, 50 mg at 04/23/25 2001    promethazine (Phenergan) tablet 12.5 mg, 12.5 mg, oral, q6h PRN, ANGELA Huynh, 12.5 mg at 04/23/25 2001    sodium chloride 0.9 % bolus 200 mL, 200 mL, intravenous, q1h PRN, Vu Pedersen MD    vancomycin (Vancocin) pharmacy to dose - pharmacy monitoring, , miscellaneous, Daily PRN, ANGELA Kincaid    vancomycin 5 mg/mL in NS lock, 2 mL, intra-catheter, Once, Adama Soto MD

## 2025-04-24 NOTE — CARE PLAN
The patient's goals for the shift include      The clinical goals for the shift include keep pain at acceptable level    Over the shift, the patient did not make progress toward the following goals. Barriers to progression include chronic pain. Recommendations to address these barriers include prn pain meds.

## 2025-04-24 NOTE — NURSING NOTE
Pt resting in bed. No complaints or concerns. Call light within reach. Pt continues on atb per order. Dialysis cath in place to kam groin.  Bed alarm in place for pt safety.

## 2025-04-24 NOTE — NURSING NOTE
Pt A&O x4 currently resting in bed. No complaints or concerns. Call light within reach.  Pt continues on atb per order.  Pt npo for procedure today.

## 2025-04-24 NOTE — CARE PLAN
The patient's goals for the shift include  rest    The clinical goals for the shift include keep pain at acceptable level

## 2025-04-24 NOTE — INDIVIDUALIZED OVERALL PLAN OF CARE NOTE
Pt about to go down for exchange of tunneled dialysis catheter. BP elevated. C/o pain. RN aware. She would like me to return tomorrow to help completing Advanced Directives. Wants to talk to her spouse and daughter this evening.

## 2025-04-24 NOTE — POST-PROCEDURE NOTE
Interventional Radiology Brief Postprocedure Note    Attending: Chris Lott MD     Assistant: none    Diagnosis: ESRD, displaced TDC    Description of procedure: Exchange of TDC right upper thigh     Anesthesia:  MAC Deep    Complications: None    Estimated Blood Loss: none    Medications  As of 04/24/25 1643      oxyCODONE-acetaminophen (Percocet) 5-325 mg per tablet 2 tablet (tablet) Total dose:  2 tablet Dosing weight:  67      Date/Time Rate/Dose/Volume Action       04/17/25  1324 2 tablet Given               oxyCODONE-acetaminophen (Percocet) 5-325 mg per tablet 1 tablet (tablet) Total dose:  1 tablet Dosing weight:  67      Date/Time Rate/Dose/Volume Action       04/18/25  0158 1 tablet Given               oxyCODONE-acetaminophen (Percocet) 5-325 mg per tablet 1 tablet (tablet) Total dose:  Cannot be calculated* Dosing weight:  70.7   *Administration dose not documented     Date/Time Rate/Dose/Volume Action       04/18/25  1635 *Not included in total MAR Hold      1636 *Not included in total MAR Unhold               piperacillin-tazobactam (Zosyn) 4.5 g in dextrose (iso)  mL (g) Total dose:  4.5 g* Dosing weight:  67   *From user-documented volume     Date/Time Rate/Dose/Volume Action       04/17/25  1445 4.5 g (over 30 min) New Bag      1720 100 mL Stopped               vancomycin (Xellia) 1,000 mg in diluent combination  mL (mg) Total dose:  0 mg* Dosing weight:  67   *Administration not included in total     Date/Time Rate/Dose/Volume Action       04/17/25  2155 *1,000 mg - 200 mL/hr (over 60 min) Missed               diphenhydrAMINE (BENADryl) injection 25 mg (mg) Total dose:  25 mg Dosing weight:  67      Date/Time Rate/Dose/Volume Action       04/17/25  1438 25 mg Given               diphenhydrAMINE (BENADryl) injection 50 mg (mg) Total dose:  50 mg Dosing weight:  67      Date/Time Rate/Dose/Volume Action       04/17/25  1747 50 mg Given               diphenhydrAMINE (BENADryl) injection  50 mg (mg) Total dose:  50 mg Dosing weight:  67      Date/Time Rate/Dose/Volume Action       04/17/25  2143 50 mg Given               diphenhydrAMINE (BENADryl) injection 25 mg (mg) Total dose:  50 mg Dosing weight:  70      Date/Time Rate/Dose/Volume Action       04/18/25  0158 25 mg Given      0804 25 mg Given               diphenhydrAMINE (BENADryl) injection 25 mg (mg) Total dose:  350 mg Dosing weight:  70.7      Date/Time Rate/Dose/Volume Action       04/18/25  1433 25 mg Given      1635 *Not included in total MAR Hold      1723 *Not included in total MAR Unhold      1821 25 mg Given      2130 25 mg Given     04/19/25  0046 25 mg Given      0426 25 mg Given      0751 25 mg Given      1301 25 mg Given      1616 25 mg Given      1933 25 mg Given      2230 25 mg Given     04/20/25  0137 25 mg Given      0445 25 mg Given      0814 25 mg Given      1117 25 mg Given               diphenhydrAMINE (BENADryl) injection 25 mg (mg) Total dose:  25 mg Dosing weight:  71      Date/Time Rate/Dose/Volume Action       04/19/25  0916 25 mg Given               diphenhydrAMINE (BENADryl) injection 50 mg (mg) Total dose:  1,500 mg Dosing weight:  70.6      Date/Time Rate/Dose/Volume Action       04/20/25  1428 50 mg Given      1729 50 mg Given      2031 50 mg Given      2329 50 mg Given     04/21/25  0233 50 mg Given      0529 50 mg Given      0849 50 mg Given      1145 50 mg Given      1515 50 mg Given      1835 50 mg Given      2140 50 mg Given     04/22/25  0042 50 mg Given      0357 50 mg Given      0644 50 mg Given      0957 50 mg Given      1309 50 mg Given      1635 50 mg Given      1929 50 mg Given      2239 50 mg Given     04/23/25  0142 50 mg Given      0434 50 mg Given      0758 50 mg Given      1051 50 mg Given      1618 50 mg Given      1950 50 mg Given      2343 50 mg Given     04/24/25  0258 50 mg Given      0614 50 mg Given      0934 50 mg Given      1233 50 mg Given               diphenhydrAMINE (BENADryl)  injection 50 mg (mg) Total dose:  50 mg Dosing weight:  73.4      Date/Time Rate/Dose/Volume Action       04/23/25  1253 50 mg Given               HYDROmorphone (Dilaudid) injection 1 mg (mg) Total dose:  1 mg Dosing weight:  67      Date/Time Rate/Dose/Volume Action       04/17/25  1438 1 mg Given               HYDROmorphone (Dilaudid) injection 0.6 mg (mg) Total dose:  26.4 mg Dosing weight:  70.7      Date/Time Rate/Dose/Volume Action       04/18/25  1433 0.6 mg Given      1635 *Not included in total MAR Hold      1723 *Not included in total MAR Unhold      1821 0.6 mg Given      2130 0.6 mg Given     04/19/25  0046 0.6 mg Given      0426 0.6 mg Given      0751 0.6 mg Given      1301 0.6 mg Given      1616 0.6 mg Given      1930 0.6 mg Given      2230 0.6 mg Given     04/20/25  0137 0.6 mg Given      0445 0.6 mg Given      0814 0.6 mg Given      1117 0.6 mg Given      1428 0.6 mg Given      1729 0.6 mg Given      2031 0.6 mg Given      2329 0.6 mg Given     04/21/25  0233 0.6 mg Given      0529 0.6 mg Given      0849 0.6 mg Given      1145 0.6 mg Given      1515 0.6 mg Given      1834 0.6 mg Given      2140 0.6 mg Given     04/22/25  0042 0.6 mg Given      0357 0.6 mg Given      0644 0.6 mg Given      0957 0.6 mg Given      1309 0.6 mg Given      1640 0.6 mg Given      1929 0.6 mg Given      2239 0.6 mg Given     04/23/25  0142 0.6 mg Given      0434 0.6 mg Given      0746 0.6 mg Given      1051 0.6 mg Given      1618 0.6 mg Given      1950 0.6 mg Given      2343 0.6 mg Given     04/24/25  0258 0.6 mg Given      0614 0.6 mg Given      0933 0.6 mg Given      1233 0.6 mg Given               HYDROmorphone (Dilaudid) injection 0.6 mg (mg) Total dose:  0.6 mg Dosing weight:  73.4      Date/Time Rate/Dose/Volume Action       04/23/25  1253 0.6 mg Given               dextrose 50 % injection 25 g (g) Total dose:  Cannot be calculated* Dosing weight:  67   *Administration dose not documented     Date/Time Rate/Dose/Volume  Action       04/17/25  1649 *Not included in total MAR Hold      1653 *Not included in total MAR Unhold      1741 *25 g Missed               dextrose 50 % injection 25 g (g) Total dose:  25 g Dosing weight:  67      Date/Time Rate/Dose/Volume Action       04/17/25  1741 25 g (over 5 min) Given               dextrose injection 50 %  - Omnicell Override Pull (g) Total dose:  25 g      Date/Time Rate/Dose/Volume Action       04/17/25  1741 25 g (over 5 min) Given               dextrose 50 % injection 25 g (g) Total dose:  0 g* Dosing weight:  71   *Administration not included in total     Date/Time Rate/Dose/Volume Action       04/19/25  0905 *25 g (over 5 min) Missed               insulin regular (HumuLIN R,NovoLIN R) injection 5 Units (Units) Total dose:  Cannot be calculated* Dosing weight:  67   *Administration dose not documented     Date/Time Rate/Dose/Volume Action       04/17/25 1649 *Not included in total MAR Hold      1653 *Not included in total MAR Unhold      1741 *5 Units Missed               insulin regular (HumuLIN R,NovoLIN R) injection 10 Units (Units) Total dose:  10 Units Dosing weight:  67      Date/Time Rate/Dose/Volume Action       04/17/25  1723 10 Units (over 1 min) Given               insulin regular (HumuLIN R,NovoLIN R) injection 6 Units (Units) Total dose:  0 Units* Dosing weight:  71   *Administration not included in total     Date/Time Rate/Dose/Volume Action       04/19/25  0904 *6 Units (over 1 min) Missed               calcium gluconate 2 g in sodium chloride (iso)  mL (g) Total dose:  0 g* Dosing weight:  71   *Administration not included in total     Date/Time Rate/Dose/Volume Action       04/19/25  0904 *2 g - 600 mL/hr (over 10 min) Missed               calcium gluconate 2 g in sodium chloride (iso)  mL (mL/hr) Total dose:  2 g* Dosing weight:  67   *From user-documented volume     Date/Time Rate/Dose/Volume Action       04/17/25 1649 *Not included in total MAR Hold       1653 *Not included in total MAR Unhold      1725 2 g - 600 mL/hr (over 10 min) New Bag      1831 100 mL Stopped               sodium zirconium cyclosilicate (Lokelma) packet 10 g (g) Total dose:  Cannot be calculated* Dosing weight:  67   *Administration dose not documented     Date/Time Rate/Dose/Volume Action       04/17/25  1649 *Not included in total MAR Hold      1653 *Not included in total MAR Unhold      1741 *10 g Missed               sodium zirconium cyclosilicate (Lokelma) packet 10 g (g) Total dose:  Cannot be calculated* Dosing weight:  67   *Administration dose not documented     Date/Time Rate/Dose/Volume Action       04/17/25  1725 *10 g Missed     04/18/25  0145 *10 g Missed      0942 *10 g Missed      1635 *Not included in total MAR Hold      1635 *Not included in total MAR Unhold      1720 *10 g Missed     04/19/25  0046 *10 g Missed      0903 *10 g Missed               heparin 1,000 unit/mL injection 1,000 Units (Units) Total dose:  1.2 Units* Dosing weight:  67   *Administration not included in total     Date/Time Rate/Dose/Volume Action       04/17/25  1649 *Not included in total MAR Hold      1653 *Not included in total MAR Unhold      1742 *1,000 Units Missed     04/18/25  0154 1.2 Units Given      1635 *Not included in total MAR Hold      1635 *Not included in total MAR Unhold      1637 *1,000 Units Missed               heparin 1,000 unit/mL injection 1,000 Units (Units) Total dose:  0 Units* Dosing weight:  70.7   *Administration not included in total     Date/Time Rate/Dose/Volume Action       04/19/25  1101 *1,000 Units Missed     04/20/25  0001 *1,000 Units Missed      1106 *1,000 Units Missed               heparin 1,000 unit/mL injection 1,000 Units (Units) Total dose:  2,200 Units* Dosing weight:  70.6   *Administration not included in total     Date/Time Rate/Dose/Volume Action       04/21/25  1742 1,200 Units Given     04/22/25  1101 *1,000 Units Missed     04/23/25  1600 1,000  Units Given     04/24/25  0827 *1,000 Units Missed               heparin 1,000 unit/mL injection (Units) Total dose:  5,200 Units      Date/Time Rate/Dose/Volume Action       04/24/25  1639 5,200 Units Given               dextrose 10 % in water (D10W) infusion (mL/hr) Total volume:  187.5 mL* Dosing weight:  67   *From user-documented volume     Date/Time Rate/Dose/Volume Action       04/17/25  1725 50 mL/hr New Bag      1925 50 mL/hr - 100 mL Rate Verify      2110 87.5 mL Stopped               sodium bicarbonate 8.4 % (1 mEq/mL) 50 mEq (mEq) Total dose:  50 mEq Dosing weight:  67      Date/Time Rate/Dose/Volume Action       04/17/25  1724 50 mEq Given               aspirin EC tablet 81 mg (mg) Total dose:  162 mg*   *Administration not included in total     Date/Time Rate/Dose/Volume Action       04/18/25  0941 81 mg Given      1635 *Not included in total MAR Hold      1635 *Not included in total MAR Unhold     04/19/25  0903 *81 mg Missed     04/20/25  0814 81 mg Given     04/21/25  1306 *81 mg Missed     04/22/25  1246 *81 mg Missed     04/23/25  0805 *81 mg Missed     04/24/25  0827 *81 mg Missed               atorvastatin (Lipitor) tablet 40 mg (mg) Total dose:  80 mg*   *Administration not included in total     Date/Time Rate/Dose/Volume Action       04/18/25  0941 40 mg Given      1635 *Not included in total MAR Hold      1635 *Not included in total MAR Unhold     04/19/25  0903 *40 mg Missed     04/20/25  0814 40 mg Given     04/21/25  1314 *40 mg Missed     04/22/25  1245 *40 mg Missed     04/23/25  0805 *40 mg Missed     04/24/25  0827 *40 mg Missed               calcitriol (Rocaltrol) capsule 0.5 mcg (mcg) Total dose:  0.5 mcg*   *Administration not included in total     Date/Time Rate/Dose/Volume Action       04/18/25  0941 *0.5 mcg Missed      1635 *Not included in total MAR Hold      1635 *Not included in total MAR Unhold     04/19/25  0903 *0.5 mcg Missed     04/20/25  0814 0.5 mcg Given     04/21/25   1314 *0.5 mcg Missed     04/22/25  1245 *0.5 mcg Missed     04/23/25  0805 *0.5 mcg Missed     04/24/25  0827 *0.5 mcg Missed               cloNIDine (Catapres) tablet 0.1 mg (mg) Total dose:  Cannot be calculated*   *Administration dose not documented     Date/Time Rate/Dose/Volume Action       04/17/25  1704 *Not included in total Held by provider      1730 *0.1 mg Missed      2151 *Not included in total Unheld by provider               cloNIDine (Catapres) tablet 0.1 mg (mg) Total dose:  1.4 mg      Date/Time Rate/Dose/Volume Action       04/18/25  0509 0.1 mg Given      1434 0.1 mg Given      1635 *Not included in total MAR Hold      1636 *Not included in total MAR Unhold      2125 0.1 mg Given     04/19/25  0659 0.1 mg Given      1301 0.1 mg Given      2110 0.1 mg Given     04/20/25  0446 0.1 mg Given      1405 0.1 mg Given      2244 0.1 mg Given     04/21/25  0531 0.1 mg Given      1313 0.1 mg Given      2140 0.1 mg Given     04/22/25  0644 0.1 mg Given      1310 0.1 mg Given               epoetin brandy-epbx (Retacrit) injection 6,440 Units (Units/kg) Total dose:  Cannot be calculated* Dosing weight:  64.4   *Administration dose not documented     Date/Time Rate/Dose/Volume Action       04/17/25  1704 *Not included in total Held by provider      1717 *Not included in total Unheld by provider               epoetin brandy-epbx (Retacrit) injection 6,440 Units (Units) Total dose:  19,320 Units* Dosing weight:  64.4   *Administration not included in total     Date/Time Rate/Dose/Volume Action       04/18/25  1635 *Not included in total MAR Hold      1635 *Not included in total MAR Unhold      1753 *6,440 Units Missed      1850 6,440 Units Given     04/21/25  1842 6,440 Units Given     04/23/25  1803 6,440 Units Given               ergocalciferol (Vitamin D-2) capsule 1.25 mg (mg) Total dose:  1.25 mg      Date/Time Rate/Dose/Volume Action       04/17/25  1704 *Not included in total Held by provider      7503 *Not  included in total Unheld by provider     04/18/25  1635 *Not included in total MAR Hold      1635 *Not included in total MAR Unhold     04/20/25  0814 1.25 mg Given               hydrOXYzine HCL (Atarax) tablet 25 mg (mg) Total dose:  175 mg Dosing weight:  67      Date/Time Rate/Dose/Volume Action       04/18/25  1635 *Not included in total MAR Hold      1635 *Not included in total MAR Unhold     04/21/25  1332 25 mg Given      1938 25 mg Given     04/22/25  0140 25 mg Given      1237 25 mg Given      2150 25 mg Given     04/23/25  0424 25 mg Given     04/24/25  0831 25 mg Given               levETIRAcetam (Keppra) tablet 750 mg (mg) Total volume:  Not documented*   *Total volume has not been documented. View each administration to see the amount administered.     Date/Time Rate/Dose/Volume Action       04/17/25  1704 *Not included in total Held by provider      2105 *Not included in total Unheld by provider     04/18/25  1635 *Not included in total MAR Hold      1635 *Not included in total MAR Unhold      2125 750 mg Given     04/19/25  2110 *Not included in total Missed     04/20/25  2300 *750 mg Missed     04/21/25 2200 *750 mg Missed     04/22/25 2151 *750 mg Missed     04/23/25 1956 *750 mg Missed               methocarbamol (Robaxin) tablet 750 mg (mg) Total dose:  Cannot be calculated* Dosing weight:  67   *Administration dose not documented     Date/Time Rate/Dose/Volume Action       04/17/25  1704 *Not included in total Held by provider               metoprolol tartrate (Lopressor) tablet 25 mg (mg) Total dose:  275 mg* Dosing weight:  67   *Administration not included in total     Date/Time Rate/Dose/Volume Action       04/17/25  1704 *Not included in total Held by provider      2100 *Not included in total Automatically Held      2151 *Not included in total Unheld by provider      2200 *25 mg Missed     04/18/25  0941 25 mg Given      1635 *Not included in total MAR Hold      1635 *Not included in  total MAR Unhold      2126 25 mg Given     04/19/25  0903 *25 mg Missed      2110 25 mg Given     04/20/25  0814 25 mg Given      2031 25 mg Given     04/21/25  1322 *25 mg Missed      2140 25 mg Given     04/22/25  1239 25 mg Given      2150 25 mg Given     04/23/25  0806 25 mg Given      2001 25 mg Given     04/24/25  0831 25 mg Given               pregabalin (Lyrica) capsule 50 mg (mg) Total dose:  200 mg*   *Administration not included in total     Date/Time Rate/Dose/Volume Action       04/17/25  2100 *50 mg Missed     04/18/25  0941 50 mg Given      1635 *Not included in total MAR Hold      1635 *Not included in total MAR Unhold      2125 50 mg Given     04/19/25  0903 *50 mg Missed      2110 *50 mg Missed     04/20/25  0814 50 mg Given      2301 *50 mg Missed     04/21/25  1333 *50 mg Missed      2200 *50 mg Missed     04/22/25  1316 *50 mg Missed      2158 *50 mg Missed     04/23/25  0805 *50 mg Missed      2001 50 mg Given     04/24/25  0831 *50 mg Missed               promethazine (Phenergan) tablet 25 mg (mg) Total dose:  50 mg*   *Administration not included in total     Date/Time Rate/Dose/Volume Action       04/18/25  0514 25 mg Given      1635 *Not included in total MAR Hold      1635 *Not included in total MAR Unhold     04/21/25  0705 25 mg Given      1259 *25 mg Missed               promethazine (Phenergan) tablet 12.5 mg (mg) Total dose:  100 mg      Date/Time Rate/Dose/Volume Action       04/21/25  1326 12.5 mg Given      2051 12.5 mg Given     04/22/25  0357 12.5 mg Given      0843 12.5 mg Given      1751 12.5 mg Given     04/23/25  0027 12.5 mg Given      0637 12.5 mg Given      2001 12.5 mg Given               HYDROmorphone PF (Dilaudid) injection 0.2 mg (mg) Total dose:  0.6 mg Dosing weight:  67      Date/Time Rate/Dose/Volume Action       04/17/25  1742 0.2 mg Given      2143 0.2 mg Given     04/18/25  0240 0.2 mg Given               HYDROmorphone PF (Dilaudid) injection 0.2 mg (mg) Total  dose:  0.6 mg Dosing weight:  70.7      Date/Time Rate/Dose/Volume Action       04/18/25  0653 0.2 mg Given      0948 0.2 mg Given      1300 0.2 mg Given               midazolam (Versed) injection 2 mg (mg) Total dose:  6 mg Dosing weight:  67      Date/Time Rate/Dose/Volume Action       04/17/25 1957 2 mg Given      2009 2 mg Given      2030 2 mg Given               fentaNYL PF (Sublimaze) injection 50 mcg (mcg) Total dose:  50 mcg Dosing weight:  67      Date/Time Rate/Dose/Volume Action       04/17/25 2016 50 mcg Given               lidocaine (Xylocaine) 10 mg/mL (1 %) injection 50 mg (mg) Total dose:  50 mg Dosing weight:  67      Date/Time Rate/Dose/Volume Action       04/17/25 2050 50 mg Given               piperacillin-tazobactam (Zosyn) 3.375 g in dextrose (iso) IV 50 mL (g) Total dose:  0 g* Dosing weight:  67   *Administration not included in total     Date/Time Rate/Dose/Volume Action       04/17/25 2154 *3.375 g (over 30 min) Missed               heparin (porcine) injection 5,000 Units (Units) Total dose:  0 Units* Dosing weight:  67   *Administration not included in total     Date/Time Rate/Dose/Volume Action       04/17/25 2134 *5,000 Units Missed               vancomycin (Xellia) 1,750 mg in diluent combination  mL (mL/hr) Total dose:  1,750 mg* Dosing weight:  70   *From user-documented volume     Date/Time Rate/Dose/Volume Action       04/18/25  0049 1,750 mg - 200 mL/hr (over 105 min) New Bag      0234 350 mL Stopped               piperacillin-tazobactam (Zosyn) 2.25 g in dextrose (iso) IV 50 mL (g) Total dose:  36 g* Dosing weight:  70   *From user-documented volume     Date/Time Rate/Dose/Volume Action       04/18/25  0150 2.25 g (over 30 min) New Bag      0220 50 mL Stopped      0943 2.25 g (over 30 min) New Bag      1015 50 mL Stopped      1821 2.25 g (over 30 min) New Bag      1917  (over 30 min) Stopped     04/19/25  0105 2.25 g (over 30 min) New Bag      0108  (over 30 min)  Stopped      1301 2.25 g (over 30 min) - 50 mL New Bag      1330  (over 30 min) Stopped      2111 2.25 g (over 30 min) New Bag      2231 50 mL Stopped     04/20/25  0446 2.25 g (over 30 min) New Bag      0525 50 mL Stopped      1405 2.25 g (over 30 min) New Bag      1440  (over 30 min) Stopped      2244 2.25 g (over 30 min) New Bag      2336 100 mL Stopped     04/21/25  0531 2.25 g (over 30 min) New Bag      0607 50 mL Stopped      1331 2.25 g (over 30 min) New Bag      1431 50 mL Stopped      2223 2.25 g (over 30 min) New Bag      2253 50 mL Stopped     04/22/25  0647 2.25 g (over 30 min) New Bag      0729  (over 30 min) Stopped      1310 2.25 g (over 30 min) New Bag      1420  (over 30 min) Stopped      1500 100 mL       2241 2.25 g (over 30 min) New Bag      2311 50 mL Stopped     04/23/25  0638 2.25 g (over 30 min) New Bag      0739  (over 30 min) Stopped      1503 *2.25 g (over 30 min) Missed      2104 2.25 g (over 30 min) New Bag      2121  (over 30 min) Stopped      2233 100 mL      04/24/25  0504 2.25 g (over 30 min) New Bag      0508 50 mL Stopped               oxyCODONE-acetaminophen (Percocet)  mg per tablet 1 tablet (tablet) Total dose:  1 tablet Dosing weight:  70.7      Date/Time Rate/Dose/Volume Action       04/18/25  0632 1 tablet Given      1635 *Not included in total MAR Hold      1636 *Not included in total MAR Unhold               promethazine (Phenergan) 12.5 mg in sodium chloride 0.9% 50 mL IV (mg) Total dose:  12.5 mg Dosing weight:  70.7      Date/Time Rate/Dose/Volume Action       04/18/25  1300 12.5 mg (over 15 min) Given               promethazine (Phenergan) 12.5 mg in sodium chloride 0.9% 50 mL IV (mg) Total dose:  12.5 mg* Dosing weight:  73.8   *From user-documented volume     Date/Time Rate/Dose/Volume Action       04/22/25  1113 12.5 mg (over 15 min) Given      1500 50.5 mL                vancomycin (Xellia) 750 mg in diluent combination  mL (mL/hr) Total dose:  750 mg*  Dosing weight:  70.7   *From user-documented volume     Date/Time Rate/Dose/Volume Action       04/19/25  1709 750 mg - 200 mL/hr (over 45 min) - 150 mL New Bag      1758  (over 45 min) Stopped               acetaminophen (Tylenol) tablet 650 mg (mg) Total dose:  Cannot be calculated* Dosing weight:  70.7   *Administration dose not documented     Date/Time Rate/Dose/Volume Action       04/18/25  1635 *Not included in total MAR Hold      1723 *Not included in total MAR Unhold               mirtazapine (Remeron) tablet 7.5 mg (mg) Total dose:  22.5 mg Dosing weight:  70.7      Date/Time Rate/Dose/Volume Action       04/18/25  2125 7.5 mg Given     04/19/25  2110 7.5 mg Given     04/20/25  2032 7.5 mg Given               mupirocin (Bactroban) 2 % ointment Total dose:  Cannot be calculated* Dosing weight:  71.4   *Administration does not have dose documented     Date/Time Rate/Dose/Volume Action       04/21/25  1100  Given      1801  Given      2200 *Not included in total Missed     04/22/25  1241 *Not included in total Missed      1752 *Not included in total Missed      2153  Given     04/23/25  0810 *Not included in total Missed      1504 *Not included in total Missed      2000  Given     04/24/25  0827 *Not included in total Missed      1438 *Not included in total Missed               mirtazapine (Remeron) tablet 15 mg (mg) Total dose:  45 mg Dosing weight:  71.4      Date/Time Rate/Dose/Volume Action       04/21/25  2140 15 mg Given     04/22/25  2150 15 mg Given     04/23/25  2001 15 mg Given               hydrALAZINE (Apresoline) injection 5 mg (mg) Total dose:  15 mg Dosing weight:  73.8      Date/Time Rate/Dose/Volume Action       04/23/25  1618 5 mg Given     04/24/25  0504 5 mg Given      1233 5 mg Given               cloNIDine (Catapres) tablet 0.2 mg (mg) Total dose:  1.4 mg*   *Administration not included in total     Date/Time Rate/Dose/Volume Action       04/22/25  1630 0.2 mg Given     04/23/25  0027 0.2  mg Given      0637 0.2 mg Given      1320 *0.2 mg Missed      1802 0.2 mg Given      2104 0.2 mg Given     04/24/25  0504 0.2 mg Given      1355 0.2 mg Given               vancomycin 5 mg/mL in NS lock (mL) Total volume:  Not documented* Dosing weight:  73.4   *Total volume has not been documented. View each administration to see the amount administered.     Date/Time Rate/Dose/Volume Action       04/23/25  1809 *2 mL Missed               lidocaine PF (Xylocaine) 10 mg/mL (1 %) injection (mL) Total volume:  5 mL      Date/Time Rate/Dose/Volume Action       04/24/25  1619 5 mL Given                   No specimens collected      See detailed result report with images in PACS.    The patient tolerated the procedure well without incident or complication and is in stable condition.

## 2025-04-24 NOTE — CARE PLAN
The patient's goals for the shift include      The clinical goals for the shift include pain control, Monitor dialysis catheter site

## 2025-04-24 NOTE — PROGRESS NOTES
"Batsheva Page is a 49 y.o. female on day 7 of admission presenting with Right foot infection.    Subjective   Patient resting in bed. Feels panicky today. Worried about the procedure scheduled. Feels pain is stable and denies other new complaints.        Objective     Physical Exam  Constitutional:       Appearance: She is ill-appearing.   HENT:      Head: Normocephalic and atraumatic.      Mouth/Throat:      Mouth: Mucous membranes are moist.      Pharynx: Oropharynx is clear.   Eyes:      Extraocular Movements: Extraocular movements intact.      Conjunctiva/sclera: Conjunctivae normal.      Pupils: Pupils are equal, round, and reactive to light.   Cardiovascular:      Rate and Rhythm: Normal rate and regular rhythm.      Pulses: Normal pulses.      Heart sounds: Normal heart sounds.   Pulmonary:      Effort: Pulmonary effort is normal.      Breath sounds: Normal breath sounds.   Abdominal:      General: Bowel sounds are normal.      Palpations: Abdomen is soft.      Tenderness: There is no abdominal tenderness.   Musculoskeletal:         General: Normal range of motion.      Cervical back: Normal range of motion and neck supple.   Skin:     General: Skin is warm and dry.      Capillary Refill: Capillary refill takes less than 2 seconds.   Neurological:      General: No focal deficit present.      Mental Status: She is alert and oriented to person, place, and time.   Psychiatric:         Mood and Affect: Mood normal.         Behavior: Behavior normal.         Last Recorded Vitals  Blood pressure (!) 187/94, pulse 78, temperature 36.6 °C (97.9 °F), temperature source Oral, resp. rate 16, height 1.727 m (5' 8\"), weight 72.4 kg (159 lb 9.8 oz), SpO2 96%.  Intake/Output last 3 Shifts:  I/O last 3 completed shifts:  In: 1700 (23.5 mL/kg) [P.O.:700; I.V.:400 (5.5 mL/kg); Other:400; IV Piggyback:200]  Out: 1400 (19.3 mL/kg) [Other:1400]  Weight: 72.4 kg     Relevant Results  Results for orders placed or performed " during the hospital encounter of 04/17/25 (from the past 24 hours)   CBC   Result Value Ref Range    WBC 6.1 4.4 - 11.3 x10*3/uL    nRBC 0.0 0.0 - 0.0 /100 WBCs    RBC 3.37 (L) 4.00 - 5.20 x10*6/uL    Hemoglobin 10.2 (L) 12.0 - 16.0 g/dL    Hematocrit 32.5 (L) 36.0 - 46.0 %    MCV 96 80 - 100 fL    MCH 30.3 26.0 - 34.0 pg    MCHC 31.4 (L) 32.0 - 36.0 g/dL    RDW 16.7 (H) 11.5 - 14.5 %    Platelets 194 150 - 450 x10*3/uL   Renal Function Panel   Result Value Ref Range    Glucose 87 74 - 99 mg/dL    Sodium 138 136 - 145 mmol/L    Potassium 4.4 3.5 - 5.3 mmol/L    Chloride 98 98 - 107 mmol/L    Bicarbonate 27 21 - 32 mmol/L    Anion Gap 17 10 - 20 mmol/L    Urea Nitrogen 23 6 - 23 mg/dL    Creatinine 4.90 (H) 0.50 - 1.05 mg/dL    eGFR 10 (L) >60 mL/min/1.73m*2    Calcium 7.3 (L) 8.6 - 10.3 mg/dL    Phosphorus 4.4 2.5 - 4.9 mg/dL    Albumin 3.4 3.4 - 5.0 g/dL   Magnesium   Result Value Ref Range    Magnesium 1.93 1.60 - 2.40 mg/dL          This patient has a central line   Reason for the central line remaining today? Dialysis/Hemapheresis      Assessment & Plan  Hyperkalemia    Dialysis catheter site infection  Patient does have a history of bacteremia and endocarditis  Right groin dialysis catheter with erythema, edema, tenderness, drainage on admission   - Now, small nodule noted at site, but no redness or drainage from site noted   - non-functioning   - plan for removal   Blood cultures negative to date  Continue IV antibiotics - per ID recs   Temporary catheter has been placed in the left groin without signs or symptoms of infection currently. Plan for tunneled cath placement today   Plan for anesthesia during cath removal/replacement   ID recommending resuming vancomycin dwell after each dialysis session  Pain management    ESRD on HD  Right groin dialysis catheter infected and nonfunctioning, will need removed prior to discharge  Left femoral temporary HD catheter placed 4/17  Potassium 6.1 on arrival - stat  dialysis done   - improved   Continue home Retacrit, Calcitrol  IR consulted for tunneled line exchange  Nephrology consulted  Receives Benadryl prior to HD for itching, continue    HTN  Continue metoprolol and clonidine  PRN IV hydralazine for SBP > 180  Monitor blood pressure close    Coronary artery disease  Continue aspirin and statin    Seizure  Seizure precautions  Continue Keppra     Anxiety/depression  Psych consulted, appreciate recs  Remeron and Atarax ordered    Anemia secondary to chronic renal disease  Continue home Retacrit  Transfuse for Hgb > 7  Stable     PLAN:  DVT prophylaxis: SCDs, patient had declined pharmacologic prophylaxis  Renal diet - NPO for procedure today   Antibiotics per ID   IR consulted for tunneled line exchange with anesthesia   NPO after midnight  CBC, PT, and BMP in the morning  Monitor on telemetry  Hemodialysis per nephrology    Please continue current pain regimen: Dilaudid 0.6mg Q 3 hours, Benadryl 50mg IV Q 3 hours. May be given together. She has been tolerating and this is the regimen that has worked for her on previous admissions.     CODE STATUS: FULL CODE          Vania Escalante, APRN-CNP

## 2025-04-24 NOTE — ANESTHESIA PREPROCEDURE EVALUATION
Patient: Batsheva Page    Procedure Information       Date/Time: 04/24/25 1500    Scheduled providers: Chris Lott MD    Procedure: IR CVC TUNNELED    Location: [facility]            Relevant Problems   Cardiac   (+) Arteriosclerosis of coronary artery bypass graft   (+) Benign essential hypertension   (+) Paroxysmal atrial fibrillation (Multi)   (+) Primary hypertension      Neuro   (+) Anxiety   (+) Depression with anxiety   (+) Major depressive disorder, recurrent, severe with psychotic symptoms (Multi)   (+) Seizure (Multi)      /Renal   (+) ESRD (end stage renal disease) on dialysis (Multi)   (+) End-stage renal disease on hemodialysis (Multi)   (+) Hyponatremia      Hematology   (+) Anemia of chronic disease   (+) Anemia secondary to renal failure   (+) Iron deficiency anemia      ID   (+) MRSA (methicillin resistant Staphylococcus aureus) infection   (+) MRSA bacteremia   (+) Right foot infection   (+) Septic shock (Multi)       Clinical information reviewed:    Allergies  Meds  Problems    OB Status           NPO Detail:  NPO/Void Status  Date of Last Liquid: 04/23/25  Time of Last Liquid: 2300  Date of Last Solid: 04/23/25  Time of Last Solid: 2300         Physical Exam    Airway  Mallampati: II  TM distance: >3 FB  Neck ROM: full  Mouth opening: 3 or more finger widths     Cardiovascular - normal exam   Dental - normal exam     Pulmonary - normal exam   Abdominal - normal exam           Anesthesia Plan    History of general anesthesia?: yes  History of complications of general anesthesia?: no    ASA 3     MAC     The patient is not a current smoker.  Patient was not previously instructed to abstain from smoking on day of procedure.  Patient did not smoke on day of procedure.  Education provided regarding risk of obstructive sleep apnea.  intravenous induction   Postoperative pain plan includes opioids.  Trial extubation is planned.  Anesthetic plan and risks discussed with  patient.  Use of blood products discussed with patient who consented to blood products.    Plan discussed with CAA, attending and CRNA.

## 2025-04-24 NOTE — NURSING NOTE
"0200-Pt called for pain medication at approx 0500am. Pt drifting off mid conversation. Pt falling asleep.   0500am pt called for pain meds. Nurse entered room knocked on door, entered room. Pt was observed sleeping. Nurse called pts name and pt did not respond. Nurse woke pt up with touch. Pt did wake up and stated she was in 10/10 pain. Nurse admin AM meds and pt was unable to stay away for more then 3 mins. Pt kept drifting to sleep stating she had pain.  0530am- aide went to complete vitals and pt was observed sleeping and only woke up with touch. Pt was reported to be observed drifting off to sleep and stating \" tell my nurse my pain meds are due at 0600am and needs to be here at 0600am.\"        "

## 2025-04-24 NOTE — ASSESSMENT & PLAN NOTE
Dialysis catheter site infection  Patient does have a history of bacteremia and endocarditis  Right groin dialysis catheter with erythema, edema, tenderness, drainage on admission   - Now, small nodule noted at site, but no redness or drainage from site noted   - non-functioning   - plan for removal   Blood cultures negative to date  Continue IV antibiotics - per ID recs   Temporary catheter has been placed in the left groin without signs or symptoms of infection currently. Plan for tunneled cath placement today   Plan for anesthesia during cath removal/replacement   ID recommending resuming vancomycin dwell after each dialysis session  Pain management    ESRD on HD  Right groin dialysis catheter infected and nonfunctioning, will need removed prior to discharge  Left femoral temporary HD catheter placed 4/17  Potassium 6.1 on arrival - stat dialysis done   - improved   Continue home Retacrit, Calcitrol  IR consulted for tunneled line exchange  Nephrology consulted  Receives Benadryl prior to HD for itching, continue    HTN  Continue metoprolol and clonidine  PRN IV hydralazine for SBP > 180  Monitor blood pressure close    Coronary artery disease  Continue aspirin and statin    Seizure  Seizure precautions  Continue Keppra     Anxiety/depression  Psych consulted, appreciate recs  Remeron and Atarax ordered    Anemia secondary to chronic renal disease  Continue home Retacrit  Transfuse for Hgb > 7  Stable     PLAN:  DVT prophylaxis: SCDs, patient had declined pharmacologic prophylaxis  Renal diet - NPO for procedure today   Antibiotics per ID   IR consulted for tunneled line exchange with anesthesia   NPO after midnight  CBC, PT, and BMP in the morning  Monitor on telemetry  Hemodialysis per nephrology    Please continue current pain regimen: Dilaudid 0.6mg Q 3 hours, Benadryl 50mg IV Q 3 hours. May be given together. She has been tolerating and this is the regimen that has worked for her on previous admissions.      CODE STATUS: FULL CODE

## 2025-04-24 NOTE — PROGRESS NOTES
BRIEF  ID  CHART  REVIEW  NOTE      Chart reviewed  Vital signs stable  Awaiting removal of old catheter and placement of a new tunneled catheter  Remains on vancomycin and Zosyn, which should be discontinued after procedures are concluded    Adama Soto MD  [facility]  Office:  [phone]

## 2025-04-25 ENCOUNTER — APPOINTMENT (OUTPATIENT)
Dept: DIALYSIS | Facility: HOSPITAL | Age: 50
End: 2025-04-25
Payer: MEDICARE

## 2025-04-25 LAB
ALBUMIN SERPL BCP-MCNC: 3.2 G/DL (ref 3.4–5)
ANION GAP SERPL CALCULATED.3IONS-SCNC: 22 MMOL/L (ref 10–20)
BUN SERPL-MCNC: 36 MG/DL (ref 6–23)
CALCIUM SERPL-MCNC: 6.8 MG/DL (ref 8.6–10.3)
CHLORIDE SERPL-SCNC: 98 MMOL/L (ref 98–107)
CO2 SERPL-SCNC: 23 MMOL/L (ref 21–32)
CREAT SERPL-MCNC: 6.92 MG/DL (ref 0.5–1.05)
EGFRCR SERPLBLD CKD-EPI 2021: 7 ML/MIN/1.73M*2
ERYTHROCYTE [DISTWIDTH] IN BLOOD BY AUTOMATED COUNT: 16.6 % (ref 11.5–14.5)
GLUCOSE SERPL-MCNC: 71 MG/DL (ref 74–99)
HCT VFR BLD AUTO: 31.3 % (ref 36–46)
HGB BLD-MCNC: 9.9 G/DL (ref 12–16)
MAGNESIUM SERPL-MCNC: 1.96 MG/DL (ref 1.6–2.4)
MCH RBC QN AUTO: 30.5 PG (ref 26–34)
MCHC RBC AUTO-ENTMCNC: 31.6 G/DL (ref 32–36)
MCV RBC AUTO: 96 FL (ref 80–100)
NRBC BLD-RTO: 0 /100 WBCS (ref 0–0)
PHOSPHATE SERPL-MCNC: 6 MG/DL (ref 2.5–4.9)
PLATELET # BLD AUTO: 199 X10*3/UL (ref 150–450)
POTASSIUM SERPL-SCNC: 4.6 MMOL/L (ref 3.5–5.3)
RBC # BLD AUTO: 3.25 X10*6/UL (ref 4–5.2)
SODIUM SERPL-SCNC: 138 MMOL/L (ref 136–145)
WBC # BLD AUTO: 6.6 X10*3/UL (ref 4.4–11.3)

## 2025-04-25 PROCEDURE — 2500000001 HC RX 250 WO HCPCS SELF ADMINISTERED DRUGS (ALT 637 FOR MEDICARE OP): Performed by: NURSE PRACTITIONER

## 2025-04-25 PROCEDURE — 2500000001 HC RX 250 WO HCPCS SELF ADMINISTERED DRUGS (ALT 637 FOR MEDICARE OP): Performed by: INTERNAL MEDICINE

## 2025-04-25 PROCEDURE — 2500000004 HC RX 250 GENERAL PHARMACY W/ HCPCS (ALT 636 FOR OP/ED): Mod: JZ | Performed by: NURSE PRACTITIONER

## 2025-04-25 PROCEDURE — 99232 SBSQ HOSP IP/OBS MODERATE 35: CPT | Performed by: NURSE PRACTITIONER

## 2025-04-25 PROCEDURE — 84100 ASSAY OF PHOSPHORUS: CPT | Performed by: NURSE PRACTITIONER

## 2025-04-25 PROCEDURE — 2500000004 HC RX 250 GENERAL PHARMACY W/ HCPCS (ALT 636 FOR OP/ED): Performed by: NURSE PRACTITIONER

## 2025-04-25 PROCEDURE — 36415 COLL VENOUS BLD VENIPUNCTURE: CPT | Performed by: NURSE PRACTITIONER

## 2025-04-25 PROCEDURE — 2500000001 HC RX 250 WO HCPCS SELF ADMINISTERED DRUGS (ALT 637 FOR MEDICARE OP)

## 2025-04-25 PROCEDURE — 83735 ASSAY OF MAGNESIUM: CPT | Performed by: NURSE PRACTITIONER

## 2025-04-25 PROCEDURE — 2500000004 HC RX 250 GENERAL PHARMACY W/ HCPCS (ALT 636 FOR OP/ED): Performed by: ANESTHESIOLOGY

## 2025-04-25 PROCEDURE — 6350000001 HC RX 635 EPOETIN >10,000 UNITS: Performed by: NURSE PRACTITIONER

## 2025-04-25 PROCEDURE — 8010000001 HC DIALYSIS - HEMODIALYSIS PER DAY

## 2025-04-25 PROCEDURE — 2060000001 HC INTERMEDIATE ICU ROOM DAILY

## 2025-04-25 PROCEDURE — 85027 COMPLETE CBC AUTOMATED: CPT | Performed by: NURSE PRACTITIONER

## 2025-04-25 RX ORDER — CLONIDINE HYDROCHLORIDE 0.3 MG/1
0.3 TABLET ORAL EVERY 8 HOURS SCHEDULED
Status: DISCONTINUED | OUTPATIENT
Start: 2025-04-25 | End: 2025-04-27 | Stop reason: HOSPADM

## 2025-04-25 RX ADMIN — HYDROXYZINE HYDROCHLORIDE 25 MG: 25 TABLET, FILM COATED ORAL at 13:14

## 2025-04-25 RX ADMIN — HYDROMORPHONE HYDROCHLORIDE 0.6 MG: 1 INJECTION, SOLUTION INTRAMUSCULAR; INTRAVENOUS; SUBCUTANEOUS at 14:08

## 2025-04-25 RX ADMIN — METOPROLOL TARTRATE 25 MG: 25 TABLET, FILM COATED ORAL at 20:21

## 2025-04-25 RX ADMIN — ACETAMINOPHEN 650 MG: 325 TABLET ORAL at 15:40

## 2025-04-25 RX ADMIN — HYDROMORPHONE HYDROCHLORIDE 0.6 MG: 1 INJECTION, SOLUTION INTRAMUSCULAR; INTRAVENOUS; SUBCUTANEOUS at 00:22

## 2025-04-25 RX ADMIN — HYDRALAZINE HYDROCHLORIDE 5 MG: 20 INJECTION INTRAMUSCULAR; INTRAVENOUS at 11:20

## 2025-04-25 RX ADMIN — DIPHENHYDRAMINE HYDROCHLORIDE 50 MG: 50 INJECTION, SOLUTION INTRAMUSCULAR; INTRAVENOUS at 14:08

## 2025-04-25 RX ADMIN — HYDROMORPHONE HYDROCHLORIDE 0.6 MG: 1 INJECTION, SOLUTION INTRAMUSCULAR; INTRAVENOUS; SUBCUTANEOUS at 11:08

## 2025-04-25 RX ADMIN — DIPHENHYDRAMINE HYDROCHLORIDE 50 MG: 50 INJECTION, SOLUTION INTRAMUSCULAR; INTRAVENOUS at 17:16

## 2025-04-25 RX ADMIN — EPOETIN ALFA-EPBX 6500 UNITS: 20000 INJECTION, SOLUTION INTRAVENOUS; SUBCUTANEOUS at 18:01

## 2025-04-25 RX ADMIN — CLONIDINE HYDROCHLORIDE 0.3 MG: 0.3 TABLET ORAL at 21:04

## 2025-04-25 RX ADMIN — DIPHENHYDRAMINE HYDROCHLORIDE 50 MG: 50 INJECTION, SOLUTION INTRAMUSCULAR; INTRAVENOUS at 11:08

## 2025-04-25 RX ADMIN — MIRTAZAPINE 15 MG: 15 TABLET, FILM COATED ORAL at 20:21

## 2025-04-25 RX ADMIN — CLONIDINE HYDROCHLORIDE 0.2 MG: 0.2 TABLET ORAL at 05:52

## 2025-04-25 RX ADMIN — HYDROMORPHONE HYDROCHLORIDE 0.6 MG: 1 INJECTION, SOLUTION INTRAMUSCULAR; INTRAVENOUS; SUBCUTANEOUS at 06:44

## 2025-04-25 RX ADMIN — HYDROMORPHONE HYDROCHLORIDE 0.6 MG: 1 INJECTION, SOLUTION INTRAMUSCULAR; INTRAVENOUS; SUBCUTANEOUS at 20:20

## 2025-04-25 RX ADMIN — CLONIDINE HYDROCHLORIDE 0.2 MG: 0.2 TABLET ORAL at 13:14

## 2025-04-25 RX ADMIN — HYDROMORPHONE HYDROCHLORIDE 0.6 MG: 1 INJECTION, SOLUTION INTRAMUSCULAR; INTRAVENOUS; SUBCUTANEOUS at 03:42

## 2025-04-25 RX ADMIN — METOPROLOL TARTRATE 25 MG: 25 TABLET, FILM COATED ORAL at 11:12

## 2025-04-25 RX ADMIN — DIPHENHYDRAMINE HYDROCHLORIDE 50 MG: 50 INJECTION, SOLUTION INTRAMUSCULAR; INTRAVENOUS at 06:44

## 2025-04-25 RX ADMIN — DIPHENHYDRAMINE HYDROCHLORIDE 50 MG: 50 INJECTION, SOLUTION INTRAMUSCULAR; INTRAVENOUS at 00:22

## 2025-04-25 RX ADMIN — ONDANSETRON 4 MG: 2 INJECTION, SOLUTION INTRAMUSCULAR; INTRAVENOUS at 12:04

## 2025-04-25 RX ADMIN — HYDROMORPHONE HYDROCHLORIDE 0.6 MG: 1 INJECTION, SOLUTION INTRAMUSCULAR; INTRAVENOUS; SUBCUTANEOUS at 23:24

## 2025-04-25 RX ADMIN — DIPHENHYDRAMINE HYDROCHLORIDE 50 MG: 50 INJECTION, SOLUTION INTRAMUSCULAR; INTRAVENOUS at 20:20

## 2025-04-25 RX ADMIN — DIPHENHYDRAMINE HYDROCHLORIDE 50 MG: 50 INJECTION, SOLUTION INTRAMUSCULAR; INTRAVENOUS at 23:23

## 2025-04-25 RX ADMIN — DIPHENHYDRAMINE HYDROCHLORIDE 50 MG: 50 INJECTION, SOLUTION INTRAMUSCULAR; INTRAVENOUS at 03:42

## 2025-04-25 RX ADMIN — CALCITRIOL CAPSULES 0.25 MCG 0.5 MCG: 0.25 CAPSULE ORAL at 11:17

## 2025-04-25 RX ADMIN — HYDROMORPHONE HYDROCHLORIDE 0.6 MG: 1 INJECTION, SOLUTION INTRAMUSCULAR; INTRAVENOUS; SUBCUTANEOUS at 17:16

## 2025-04-25 ASSESSMENT — COGNITIVE AND FUNCTIONAL STATUS - GENERAL
HELP NEEDED FOR BATHING: A LITTLE
CLIMB 3 TO 5 STEPS WITH RAILING: A LITTLE
CLIMB 3 TO 5 STEPS WITH RAILING: A LOT
TOILETING: A LITTLE
DAILY ACTIVITIY SCORE: 21
MOBILITY SCORE: 21
DAILY ACTIVITIY SCORE: 21
MOVING TO AND FROM BED TO CHAIR: A LITTLE
HELP NEEDED FOR BATHING: A LITTLE
DRESSING REGULAR LOWER BODY CLOTHING: A LITTLE
MOBILITY SCORE: 20
WALKING IN HOSPITAL ROOM: A LITTLE
WALKING IN HOSPITAL ROOM: A LITTLE
MOVING TO AND FROM BED TO CHAIR: A LITTLE
DRESSING REGULAR LOWER BODY CLOTHING: A LITTLE
TOILETING: A LITTLE

## 2025-04-25 ASSESSMENT — PAIN DESCRIPTION - DESCRIPTORS
DESCRIPTORS: SHARP
DESCRIPTORS: ACHING;SHARP
DESCRIPTORS: SHARP
DESCRIPTORS: SHARP
DESCRIPTORS: ACHING
DESCRIPTORS: SHARP

## 2025-04-25 ASSESSMENT — PAIN SCALES - GENERAL
PAINLEVEL_OUTOF10: 6
PAINLEVEL_OUTOF10: 10 - WORST POSSIBLE PAIN
PAINLEVEL_OUTOF10: 8
PAINLEVEL_OUTOF10: 7
PAINLEVEL_OUTOF10: 0 - NO PAIN
PAINLEVEL_OUTOF10: 10 - WORST POSSIBLE PAIN
PAINLEVEL_OUTOF10: 6
PAINLEVEL_OUTOF10: 6
PAINLEVEL_OUTOF10: 5 - MODERATE PAIN

## 2025-04-25 ASSESSMENT — PAIN - FUNCTIONAL ASSESSMENT
PAIN_FUNCTIONAL_ASSESSMENT: 0-10
PAIN_FUNCTIONAL_ASSESSMENT: NO/DENIES PAIN
PAIN_FUNCTIONAL_ASSESSMENT: 0-10
PAIN_FUNCTIONAL_ASSESSMENT: 0-10

## 2025-04-25 ASSESSMENT — PAIN DESCRIPTION - LOCATION
LOCATION: GENERALIZED
LOCATION: LEG
LOCATION: LEG
LOCATION: GENERALIZED
LOCATION: GENERALIZED

## 2025-04-25 ASSESSMENT — PAIN DESCRIPTION - ORIENTATION
ORIENTATION: RIGHT
ORIENTATION: RIGHT

## 2025-04-25 NOTE — PROGRESS NOTES
"Batsheva Page is a 49 y.o. female on day 8 of admission presenting with Right foot infection.    Subjective   Patient resting in bed. She states she has pain at new catheter site and anxiety about discharging. Denies chest pain, abdominal pain, fevers, chills, but anxiety making her feel like she's not breathing well.        Objective     Physical Exam  Constitutional:       Appearance: She is ill-appearing.   HENT:      Head: Normocephalic and atraumatic.      Mouth/Throat:      Mouth: Mucous membranes are moist.      Pharynx: Oropharynx is clear.   Eyes:      Extraocular Movements: Extraocular movements intact.      Conjunctiva/sclera: Conjunctivae normal.      Pupils: Pupils are equal, round, and reactive to light.   Cardiovascular:      Rate and Rhythm: Normal rate and regular rhythm.      Pulses: Normal pulses.      Heart sounds: Normal heart sounds.   Pulmonary:      Effort: Pulmonary effort is normal.      Breath sounds: Normal breath sounds.   Abdominal:      General: Bowel sounds are normal.      Palpations: Abdomen is soft.      Tenderness: There is no abdominal tenderness.   Musculoskeletal:         General: Normal range of motion.      Cervical back: Normal range of motion and neck supple.   Skin:     General: Skin is warm and dry.      Capillary Refill: Capillary refill takes less than 2 seconds.   Neurological:      General: No focal deficit present.      Mental Status: She is alert and oriented to person, place, and time.   Psychiatric:         Mood and Affect: Mood normal.         Behavior: Behavior normal.         Last Recorded Vitals  Blood pressure 149/77, pulse 78, temperature 37.1 °C (98.8 °F), temperature source Oral, resp. rate 20, height 1.651 m (5' 5\"), weight 70.8 kg (156 lb 1.4 oz), SpO2 92%.  Intake/Output last 3 Shifts:  I/O last 3 completed shifts:  In: 350 (4.9 mL/kg) [IV Piggyback:350]  Out: 20 (0.3 mL/kg) [Blood:20]  Weight: 70.8 kg     Relevant Results  Results for orders " placed or performed during the hospital encounter of 04/17/25 (from the past 24 hours)   CBC   Result Value Ref Range    WBC 6.6 4.4 - 11.3 x10*3/uL    nRBC 0.0 0.0 - 0.0 /100 WBCs    RBC 3.25 (L) 4.00 - 5.20 x10*6/uL    Hemoglobin 9.9 (L) 12.0 - 16.0 g/dL    Hematocrit 31.3 (L) 36.0 - 46.0 %    MCV 96 80 - 100 fL    MCH 30.5 26.0 - 34.0 pg    MCHC 31.6 (L) 32.0 - 36.0 g/dL    RDW 16.6 (H) 11.5 - 14.5 %    Platelets 199 150 - 450 x10*3/uL   Renal Function Panel   Result Value Ref Range    Glucose 71 (L) 74 - 99 mg/dL    Sodium 138 136 - 145 mmol/L    Potassium 4.6 3.5 - 5.3 mmol/L    Chloride 98 98 - 107 mmol/L    Bicarbonate 23 21 - 32 mmol/L    Anion Gap 22 (H) 10 - 20 mmol/L    Urea Nitrogen 36 (H) 6 - 23 mg/dL    Creatinine 6.92 (H) 0.50 - 1.05 mg/dL    eGFR 7 (L) >60 mL/min/1.73m*2    Calcium 6.8 (L) 8.6 - 10.3 mg/dL    Phosphorus 6.0 (H) 2.5 - 4.9 mg/dL    Albumin 3.2 (L) 3.4 - 5.0 g/dL   Magnesium   Result Value Ref Range    Magnesium 1.96 1.60 - 2.40 mg/dL       Assessment & Plan  Hyperkalemia    Dialysis catheter site infection  Patient does have a history of bacteremia and endocarditis  Right groin dialysis catheter with erythema, edema, tenderness, drainage on admission   - Removed 4/24  - New catheter placed 4/24  Blood cultures negative to date  IV antibiotics stopped after line removal   Temporary catheter has been placed in the left groin without signs or symptoms of infection currently.   - Remove prior to discharge as this is her IV access for medications while admitted   ID recommending resuming vancomycin dwell after each dialysis session  Pain management    ESRD on HD  Left femoral temporary HD catheter placed 4/17  Potassium 6.1 on arrival - stat dialysis done   - improved   Continue home Retacrit, Calcitrol  Nephrology consulted  Receives Benadryl prior to HD for itching, continue    HTN  Continue metoprolol and clonidine  PRN IV hydralazine for SBP > 180  Monitor blood pressure  close    Coronary artery disease  Continue aspirin and statin    Seizure  Seizure precautions  Continue Keppra     Anxiety/depression  Psych consulted, appreciate recs  Remeron and Atarax ordered    Anemia secondary to chronic renal disease  Continue home Retacrit  Transfuse for Hgb > 7  Stable     PLAN:  DVT prophylaxis: SCDs, patient had declined pharmacologic prophylaxis  Renal diet   CBC, PT, and BMP in the morning  Monitor on telemetry  Plan for discharge to home with no needs tomorrow if BP remains controlled     Please continue current pain regimen: Dilaudid 0.6mg Q 3 hours, Benadryl 50mg IV Q 3 hours. May be given together. She has been tolerating and this is the regimen that has worked for her on previous admissions.     CODE STATUS: FULL CODE          Vania Escalante, APRN-CNP

## 2025-04-25 NOTE — PROGRESS NOTES
POA and MICAH ppwk completed with the pt, 2 copies of each along with originals given back to pt. Copies sent to be scanned into system. Pt will return home at [place].      04/25/25 9392   Discharge Planning   Expected Discharge Disposition Home

## 2025-04-25 NOTE — ANESTHESIA POSTPROCEDURE EVALUATION
Patient: Batsheva Page    Procedure Summary       Date: 04/24/25 Room / Location: [facility]    Anesthesia Start: 1549 Anesthesia Stop: 1648    Procedure: IR CVC TUNNELED Diagnosis: (dialysis access)    Scheduled Providers: Chris Lott MD Responsible Provider: Jorge Suggs MD    Anesthesia Type: MAC ASA Status: 3            Anesthesia Type: MAC    Vitals Value Taken Time   /87 04/24/25 17:30   Temp 36.1 °C (97 °F) 04/24/25 16:50   Pulse 63 04/24/25 17:30   Resp 13 04/24/25 17:30   SpO2 100 % 04/24/25 17:30       Anesthesia Post Evaluation    Patient location during evaluation: PACU  Patient participation: complete - patient participated  Level of consciousness: sleepy but conscious  Pain score: 2  Pain management: adequate  Multimodal analgesia pain management approach  Airway patency: patent  Cardiovascular status: acceptable  Respiratory status: acceptable  Hydration status: acceptable  Postoperative Nausea and Vomiting: none        No notable events documented.

## 2025-04-25 NOTE — PROGRESS NOTES
"INFECTIOUS DISEASES PROGRESS NOTE    Consulted / following patient for:  Infected vascular catheter    Subjective   Interval History:   Yesterday afternoon she was taken to IR for removal of the nonfunctioning tunneled dialysis catheter in the right femoral position, and replacement with a new one.  I spoke with Dr. Lott before the procedure.  There is no formal narrative note in the chart but the procedure was performed.  The temporary left femoral dialysis catheter is still in place, apparently for IV access.  Vancomycin was instilled after today's dialysis, but only in one of the two channels in the dialysis catheter.    Patient complains of feeling anxious and ill at ease, and she says that she feels much the same way that she did when she was at the [facility] many years ago and a tunneled dialysis catheter was removed and she was told that it \"exploded and it went everywhere in my body.\"  She feels the same way now.    Objective   PHYSICAL EXAMINATION  Vital signs:  Visit Vitals  BP (!) 191/100 (BP Location: Right arm, Patient Position: Lying)   Pulse 71   Temp 37 °C (98.6 °F) (Oral)   Resp 18      General: Anxious, not toxic  Lungs: Clear  Heart: S1, S2 normal  Abdomen: Soft, nontender  Extremities: Tender to palpation over the new tunneled dialysis catheter in the right femoral position.  Temporary left femoral dialysis catheter in place, no inflammation    Relevant Results  WBC: 6600    Microbiology:  Blood: Negative X4  Nasal swab: MSSA    ASSESSMENT:  Nonfunctioning femoral dialysis catheter  Patient has an extensive history of nonfunctioning and infected dialysis catheters and has had multiple episodes of high-grade MRSA bacteremia.  Most recently, she has been treated with a 5 mg/mL vancomycin \"dwell\" after each dialysis session.  During this admission there has been no evidence for catheter-associated bacteremia, and that catheter has now been removed.  She is anxious and " "hypertensive but her examination is otherwise unremarkable now     PLANS:  -   Systemic antibiotics have been discontinued  -   5 mg/mL vancomycin \"dwell\" each channel of the tunneled dialysis catheter  -   Remove temporary dialysis catheter from the left femoral position  -   Anticipate discharge later today, no further ID recommendations    DR. GAN COVERING 4/26-4/27 AND WILL SEE PRN YOUR CALL    Adama Soto MD  [facility]  Office:  [phone]  "

## 2025-04-25 NOTE — INDIVIDUALIZED OVERALL PLAN OF CARE NOTE
Pt at dialysis. She asked someone follow up with her today to complete advanced directives. Discussed with Luci Stone RN and she agrees to facilitate this upon pt return to floor. Goals of care established. Palliative will sign off at this time. Please feel free to re-consult if there are changes in condition or other symptom management needs arise.

## 2025-04-25 NOTE — CONSULTS
Vancomycin Dosing by Pharmacy- Cessation of Therapy    Consult to pharmacy for vancomycin dosing has been discontinued by the prescriber, pharmacy will sign off at this time.    Please call pharmacy if there are further questions or re-enter a consult if vancomycin is resumed.     Sarah Norton, [facility]

## 2025-04-25 NOTE — NURSING NOTE
This RN made Dr. Soto aware that antimicrobial dwell to HD catheter was administered but only in one Lumen because that is how the order was placed. Medication was administered to the arterial lumen and labeled appropriately.  Dr. Soto stated another order will be placed for venous lumen.   Tissue Cultured Epidermal Autograft Text: The defect edges were debeveled with a #15 scalpel blade.  Given the location of the defect, shape of the defect and the proximity to free margins a tissue cultured epidermal autograft was deemed most appropriate.  The graft was then trimmed to fit the size of the defect.  The graft was then placed in the primary defect and oriented appropriately.

## 2025-04-25 NOTE — PROGRESS NOTES
"Batsheva Page is a 49 y.o. female on day 8 of admission presenting with Right foot infection.    Subjective   Patient is seen for end-stage kidney disease and hyperkalemia admitted with malfunctioning right femoral permacath which was changed patient had dialysis with it this morning without any issues blood pressure is elevated he did not have a blood premedications prior to dialysis and she was just given that       Objective     Physical Exam  Constitutional:       General: She is not in acute distress.     Appearance: Normal appearance. She is not toxic-appearing.   Neck:      Vascular: No carotid bruit.   Cardiovascular:      Heart sounds: No murmur heard.     No friction rub. No gallop.   Pulmonary:      Breath sounds: No wheezing, rhonchi or rales.   Abdominal:      Tenderness: There is no abdominal tenderness. There is no right CVA tenderness, left CVA tenderness or rebound.   Musculoskeletal:         General: No tenderness.      Cervical back: Neck supple.      Right lower leg: No edema.      Left lower leg: No edema.   Lymphadenopathy:      Cervical: No cervical adenopathy.         Last Recorded Vitals  Blood pressure (!) 153/101, pulse 91, temperature 37 °C (98.6 °F), temperature source Oral, resp. rate 18, height 1.651 m (5' 5\"), weight 70.8 kg (156 lb 1.4 oz), SpO2 96%.    Intake/Output last 3 Shifts:  I/O last 3 completed shifts:  In: 350 (4.9 mL/kg) [IV Piggyback:350]  Out: 20 (0.3 mL/kg) [Blood:20]  Weight: 70.8 kg   Current Medications[1]   Relevant Results    Results for orders placed or performed during the hospital encounter of 04/17/25 (from the past 96 hours)   CBC   Result Value Ref Range    WBC 5.4 4.4 - 11.3 x10*3/uL    nRBC 0.0 0.0 - 0.0 /100 WBCs    RBC 3.26 (L) 4.00 - 5.20 x10*6/uL    Hemoglobin 10.0 (L) 12.0 - 16.0 g/dL    Hematocrit 31.4 (L) 36.0 - 46.0 %    MCV 96 80 - 100 fL    MCH 30.7 26.0 - 34.0 pg    MCHC 31.8 (L) 32.0 - 36.0 g/dL    RDW 16.5 (H) 11.5 - 14.5 %    Platelets " 144 (L) 150 - 450 x10*3/uL   Renal Function Panel   Result Value Ref Range    Glucose 99 74 - 99 mg/dL    Sodium 137 136 - 145 mmol/L    Potassium 4.4 3.5 - 5.3 mmol/L    Chloride 95 (L) 98 - 107 mmol/L    Bicarbonate 29 21 - 32 mmol/L    Anion Gap 17 10 - 20 mmol/L    Urea Nitrogen 17 6 - 23 mg/dL    Creatinine 3.88 (H) 0.50 - 1.05 mg/dL    eGFR 14 (L) >60 mL/min/1.73m*2    Calcium 7.5 (L) 8.6 - 10.3 mg/dL    Phosphorus 4.8 2.5 - 4.9 mg/dL    Albumin 3.3 (L) 3.4 - 5.0 g/dL   Magnesium   Result Value Ref Range    Magnesium 1.84 1.60 - 2.40 mg/dL   Vancomycin   Result Value Ref Range    Vancomycin 31.8 (H) 5.0 - 20.0 ug/mL   CBC   Result Value Ref Range    WBC 6.1 4.4 - 11.3 x10*3/uL    nRBC 0.0 0.0 - 0.0 /100 WBCs    RBC 3.06 (L) 4.00 - 5.20 x10*6/uL    Hemoglobin 9.5 (L) 12.0 - 16.0 g/dL    Hematocrit 29.6 (L) 36.0 - 46.0 %    MCV 97 80 - 100 fL    MCH 31.0 26.0 - 34.0 pg    MCHC 32.1 32.0 - 36.0 g/dL    RDW 16.6 (H) 11.5 - 14.5 %    Platelets 163 150 - 450 x10*3/uL   Renal Function Panel   Result Value Ref Range    Glucose 89 74 - 99 mg/dL    Sodium 137 136 - 145 mmol/L    Potassium 4.5 3.5 - 5.3 mmol/L    Chloride 96 (L) 98 - 107 mmol/L    Bicarbonate 27 21 - 32 mmol/L    Anion Gap 19 10 - 20 mmol/L    Urea Nitrogen 29 (H) 6 - 23 mg/dL    Creatinine 5.76 (H) 0.50 - 1.05 mg/dL    eGFR 8 (L) >60 mL/min/1.73m*2    Calcium 6.9 (L) 8.6 - 10.3 mg/dL    Phosphorus 5.8 (H) 2.5 - 4.9 mg/dL    Albumin 3.2 (L) 3.4 - 5.0 g/dL   Magnesium   Result Value Ref Range    Magnesium 1.94 1.60 - 2.40 mg/dL   Vancomycin   Result Value Ref Range    Vancomycin 28.6 (H) 5.0 - 20.0 ug/mL   hCG, quantitative, pregnancy   Result Value Ref Range    HCG, Beta-Quantitative 2 <5 mIU/mL   CBC   Result Value Ref Range    WBC 6.1 4.4 - 11.3 x10*3/uL    nRBC 0.0 0.0 - 0.0 /100 WBCs    RBC 3.37 (L) 4.00 - 5.20 x10*6/uL    Hemoglobin 10.2 (L) 12.0 - 16.0 g/dL    Hematocrit 32.5 (L) 36.0 - 46.0 %    MCV 96 80 - 100 fL    MCH 30.3 26.0 - 34.0 pg     MCHC 31.4 (L) 32.0 - 36.0 g/dL    RDW 16.7 (H) 11.5 - 14.5 %    Platelets 194 150 - 450 x10*3/uL   Renal Function Panel   Result Value Ref Range    Glucose 87 74 - 99 mg/dL    Sodium 138 136 - 145 mmol/L    Potassium 4.4 3.5 - 5.3 mmol/L    Chloride 98 98 - 107 mmol/L    Bicarbonate 27 21 - 32 mmol/L    Anion Gap 17 10 - 20 mmol/L    Urea Nitrogen 23 6 - 23 mg/dL    Creatinine 4.90 (H) 0.50 - 1.05 mg/dL    eGFR 10 (L) >60 mL/min/1.73m*2    Calcium 7.3 (L) 8.6 - 10.3 mg/dL    Phosphorus 4.4 2.5 - 4.9 mg/dL    Albumin 3.4 3.4 - 5.0 g/dL   Magnesium   Result Value Ref Range    Magnesium 1.93 1.60 - 2.40 mg/dL   CBC   Result Value Ref Range    WBC 6.6 4.4 - 11.3 x10*3/uL    nRBC 0.0 0.0 - 0.0 /100 WBCs    RBC 3.25 (L) 4.00 - 5.20 x10*6/uL    Hemoglobin 9.9 (L) 12.0 - 16.0 g/dL    Hematocrit 31.3 (L) 36.0 - 46.0 %    MCV 96 80 - 100 fL    MCH 30.5 26.0 - 34.0 pg    MCHC 31.6 (L) 32.0 - 36.0 g/dL    RDW 16.6 (H) 11.5 - 14.5 %    Platelets 199 150 - 450 x10*3/uL   Renal Function Panel   Result Value Ref Range    Glucose 71 (L) 74 - 99 mg/dL    Sodium 138 136 - 145 mmol/L    Potassium 4.6 3.5 - 5.3 mmol/L    Chloride 98 98 - 107 mmol/L    Bicarbonate 23 21 - 32 mmol/L    Anion Gap 22 (H) 10 - 20 mmol/L    Urea Nitrogen 36 (H) 6 - 23 mg/dL    Creatinine 6.92 (H) 0.50 - 1.05 mg/dL    eGFR 7 (L) >60 mL/min/1.73m*2    Calcium 6.8 (L) 8.6 - 10.3 mg/dL    Phosphorus 6.0 (H) 2.5 - 4.9 mg/dL    Albumin 3.2 (L) 3.4 - 5.0 g/dL   Magnesium   Result Value Ref Range    Magnesium 1.96 1.60 - 2.40 mg/dL         Assessment & Plan:     End-stage renal disease continue with dialysis on Monday Wednesday Friday okay to discharge from renal point review  Hyperkalemia solved  Infection of dialysis catheter site and malfunctioning dialysis catheter that was exchanged  Hypertension increase clonidine to 0.3 mg 3 times daily    Zhou Pedersen MD         [1]   Current Facility-Administered Medications:     acetaminophen (Tylenol) tablet 650 mg, 650  mg, oral, q6h PRN, ORI Kincaid-CNP    albuterol 2.5 mg /3 mL (0.083 %) nebulizer solution 2.5 mg, 2.5 mg, nebulization, Once PRN, Jorge Suggs MD    aspirin EC tablet 81 mg, 81 mg, oral, Daily, ORI Kincaid-CNP, 81 mg at 04/20/25 0814    atorvastatin (Lipitor) tablet 40 mg, 40 mg, oral, Daily, ORI Kincaid-CNP, 40 mg at 04/20/25 0814    calcitriol (Rocaltrol) capsule 0.5 mcg, 0.5 mcg, oral, Daily, ORI Kincaid-CNP, 0.5 mcg at 04/25/25 1117    cloNIDine (Catapres) tablet 0.2 mg, 0.2 mg, oral, q8h KIMBERLY, Zhou Pedersen MD, 0.2 mg at 04/25/25 1314    diphenhydrAMINE (BENADryl) injection 50 mg, 50 mg, intravenous, q3h PRN, ORI Kincaid-CNP, 50 mg at 04/25/25 1108    epoetin brandy-epbx (Retacrit) injection 6,500 Units, 6,500 Units, subcutaneous, Once per day on Monday Wednesday Friday, ANGELA Kincaid    ergocalciferol (Vitamin D-2) capsule 1.25 mg, 1.25 mg, oral, Every Sunday, ORI Kincaid-CNP, 1.25 mg at 04/20/25 0814    fentaNYL PF (Sublimaze) injection 50 mcg, 50 mcg, intravenous, q5 min PRN, Jorge Suggs MD    heparin 1,000 unit/mL injection 1,000 Units, 1,000 Units, intra-catheter, After Dialysis, ANGELA Cheatham    heparin 1,000 unit/mL injection 1,000 Units, 1,000 Units, intra-catheter, After Dialysis, ANGELA Cheatham, 1,000 Units at 04/23/25 1600    hydrALAZINE (Apresoline) injection 5 mg, 5 mg, intravenous, q6h PRN, Vania Escalante, APRN-CNP, 5 mg at 04/25/25 1120    hydrALAZINE (Apresoline) injection 5 mg, 5 mg, intravenous, q30 min PRN, Jorge Suggs MD    HYDROmorphone (Dilaudid) injection 0.4 mg, 0.4 mg, intravenous, q5 min PRN, Jorge Suggs MD    HYDROmorphone (Dilaudid) injection 0.6 mg, 0.6 mg, intravenous, q3h PRN, Noelle Doss, APRN-CNP, 0.6 mg at 04/25/25 1108    hydrOXYzine HCL (Atarax) tablet 25 mg, 25 mg, oral, q6h PRN, Sterling Asif, APRN-CNP, 25 mg at 04/25/25 1314    lactated Ringer's infusion,  100 mL/hr, intravenous, Continuous, Jorge Suggs MD    levETIRAcetam (Keppra) tablet 750 mg, 750 mg, oral, Nightly, ANGELA Kincaid, 750 mg at 04/24/25 2114    lidocaine (Xylocaine) 10 mg/mL (1 %) injection 0.1 mL, 0.1 mL, subcutaneous, Once, Jorge Suggs MD    meperidine (PF) (Demerol) injection 12.5 mg, 12.5 mg, intravenous, q10 min PRN, Jorge Suggs MD    metoprolol tartrate (Lopressor) tablet 25 mg, 25 mg, oral, BID, ANGELA Kincaid, 25 mg at 04/25/25 1112    midazolam (Versed) injection 1 mg, 1 mg, intravenous, Once PRN, Jorge Suggs MD    mirtazapine (Remeron) tablet 15 mg, 15 mg, oral, Nightly, ANGELA Michael, 15 mg at 04/24/25 2114    mupirocin (Bactroban) 2 % ointment, , Topical, TID, ANGELA Huynh, Given at 04/23/25 2000    oxyCODONE-acetaminophen (Percocet)  mg per tablet 1 tablet, 1 tablet, oral, q6h PRN, ANGELA Kincaid, 1 tablet at 04/18/25 0632    oxyCODONE-acetaminophen (Percocet) 5-325 mg per tablet 1 tablet, 1 tablet, oral, q6h PRN, ANGELA Kincaid    pregabalin (Lyrica) capsule 50 mg, 50 mg, oral, BID, ANGELA Kincaid, 50 mg at 04/23/25 2001    promethazine (Phenergan) tablet 12.5 mg, 12.5 mg, oral, q6h PRN, ANGELA Huynh, 12.5 mg at 04/23/25 2001    sodium chloride 0.9 % bolus 200 mL, 200 mL, intravenous, q1h PRN, Vu Pedersen MD, Stopped at 04/24/25 1640    vancomycin 5 mg/mL in NS lock, 2 mL, intra-catheter, Once, Adama Soto MD, Restarted at 04/25/25 5550

## 2025-04-25 NOTE — POST-PROCEDURE NOTE
Report to Receiving RN:    Report To: KATHARINE DUNHAM   Time Report Called: [id number]  Hand-Off Communication: PATIENT STABLE; PATIENT COMPLETED HD TX; PATIENT AWAITING VANCO LOCKS;LAST VITALS  Complications During Treatment: No  Ultrafiltration Treatment: No  Medications Administered During Dialysis: No  Blood Products Administered During Dialysis: No  Labs Sent During Dialysis: No  Heparin Drip Rate Changes: No  Dialysis Catheter Dressing: CDI  Last Dressing Change: 4/24/2025    Electronic Signatures:  HELDER TRINIDAD OCDT    Last Updated: 10:29 AM by HELDER TRINIDAD

## 2025-04-25 NOTE — CARE PLAN
The patient's goals for the shift include rest , less pain    The clinical goals for the shift include monitor bp, pain control    Problem: Pain - Adult  Goal: Verbalizes/displays adequate comfort level or baseline comfort level  Outcome: Progressing     Problem: Safety - Adult  Goal: Free from fall injury  Outcome: Progressing     Problem: Discharge Planning  Goal: Discharge to home or other facility with appropriate resources  Outcome: Progressing     Problem: Chronic Conditions and Co-morbidities  Goal: Patient's chronic conditions and co-morbidity symptoms are monitored and maintained or improved  Outcome: Progressing     Problem: Nutrition  Goal: Nutrient intake appropriate for maintaining nutritional needs  Outcome: Progressing     Problem: Skin  Goal: Decreased wound size/increased tissue granulation at next dressing change  Outcome: Progressing  Goal: Participates in plan/prevention/treatment measures  Outcome: Progressing  Goal: Prevent/manage excess moisture  Outcome: Progressing  Goal: Prevent/minimize sheer/friction injuries  Outcome: Progressing  Goal: Promote/optimize nutrition  Outcome: Progressing  Goal: Promote skin healing  Outcome: Progressing

## 2025-04-25 NOTE — PRE-PROCEDURE NOTE
..Report from Sending RN:    Report From: Jamaal Guzman Rn.  Recent Surgery of Procedure: No  Baseline Level of Consciousness (LOC): x4  Oxygen Use: No    Diabetic: No  Last BP Med Given Day of Dialysis: See Mar  Last Pain Med Given: See Mar  Lab Tests to be Obtained with Dialysis: No  Blood Transfusion to be Given During Dialysis: No  Available IV Access: Yes  Medications to be Administered During Dialysis: No  Continuous IV Infusion Running: No  Restraints on Currently or in the Last 24 Hours: No  Hand-Off Communication: Verbal  Dialysis Catheter Dressing: clean dry Intact  Last Dressing Change:

## 2025-04-26 LAB
ALBUMIN SERPL BCP-MCNC: 3.4 G/DL (ref 3.4–5)
ANION GAP SERPL CALCULATED.3IONS-SCNC: 17 MMOL/L (ref 10–20)
BUN SERPL-MCNC: 28 MG/DL (ref 6–23)
CALCIUM SERPL-MCNC: 7.4 MG/DL (ref 8.6–10.3)
CHLORIDE SERPL-SCNC: 97 MMOL/L (ref 98–107)
CO2 SERPL-SCNC: 27 MMOL/L (ref 21–32)
CREAT SERPL-MCNC: 5.11 MG/DL (ref 0.5–1.05)
EGFRCR SERPLBLD CKD-EPI 2021: 10 ML/MIN/1.73M*2
ERYTHROCYTE [DISTWIDTH] IN BLOOD BY AUTOMATED COUNT: 16.9 % (ref 11.5–14.5)
GLUCOSE SERPL-MCNC: 118 MG/DL (ref 74–99)
HCT VFR BLD AUTO: 33 % (ref 36–46)
HGB BLD-MCNC: 10.8 G/DL (ref 12–16)
MAGNESIUM SERPL-MCNC: 1.96 MG/DL (ref 1.6–2.4)
MCH RBC QN AUTO: 31.7 PG (ref 26–34)
MCHC RBC AUTO-ENTMCNC: 32.7 G/DL (ref 32–36)
MCV RBC AUTO: 97 FL (ref 80–100)
NRBC BLD-RTO: 0 /100 WBCS (ref 0–0)
PHOSPHATE SERPL-MCNC: 5.4 MG/DL (ref 2.5–4.9)
PLATELET # BLD AUTO: 250 X10*3/UL (ref 150–450)
POTASSIUM SERPL-SCNC: 4.1 MMOL/L (ref 3.5–5.3)
RBC # BLD AUTO: 3.41 X10*6/UL (ref 4–5.2)
SODIUM SERPL-SCNC: 137 MMOL/L (ref 136–145)
WBC # BLD AUTO: 7.2 X10*3/UL (ref 4.4–11.3)

## 2025-04-26 PROCEDURE — 2500000001 HC RX 250 WO HCPCS SELF ADMINISTERED DRUGS (ALT 637 FOR MEDICARE OP): Performed by: NURSE PRACTITIONER

## 2025-04-26 PROCEDURE — 84100 ASSAY OF PHOSPHORUS: CPT | Performed by: NURSE PRACTITIONER

## 2025-04-26 PROCEDURE — 85027 COMPLETE CBC AUTOMATED: CPT | Performed by: NURSE PRACTITIONER

## 2025-04-26 PROCEDURE — 2060000001 HC INTERMEDIATE ICU ROOM DAILY

## 2025-04-26 PROCEDURE — 99232 SBSQ HOSP IP/OBS MODERATE 35: CPT | Performed by: NURSE PRACTITIONER

## 2025-04-26 PROCEDURE — 2500000001 HC RX 250 WO HCPCS SELF ADMINISTERED DRUGS (ALT 637 FOR MEDICARE OP): Performed by: INTERNAL MEDICINE

## 2025-04-26 PROCEDURE — 2500000004 HC RX 250 GENERAL PHARMACY W/ HCPCS (ALT 636 FOR OP/ED): Mod: JZ | Performed by: NURSE PRACTITIONER

## 2025-04-26 PROCEDURE — 83735 ASSAY OF MAGNESIUM: CPT | Performed by: NURSE PRACTITIONER

## 2025-04-26 PROCEDURE — 2500000004 HC RX 250 GENERAL PHARMACY W/ HCPCS (ALT 636 FOR OP/ED): Performed by: NURSE PRACTITIONER

## 2025-04-26 PROCEDURE — 36415 COLL VENOUS BLD VENIPUNCTURE: CPT | Performed by: NURSE PRACTITIONER

## 2025-04-26 PROCEDURE — 2500000001 HC RX 250 WO HCPCS SELF ADMINISTERED DRUGS (ALT 637 FOR MEDICARE OP)

## 2025-04-26 RX ORDER — CARVEDILOL 6.25 MG/1
6.25 TABLET ORAL 2 TIMES DAILY
Status: DISCONTINUED | OUTPATIENT
Start: 2025-04-26 | End: 2025-04-27

## 2025-04-26 RX ADMIN — DIPHENHYDRAMINE HYDROCHLORIDE 50 MG: 50 INJECTION, SOLUTION INTRAMUSCULAR; INTRAVENOUS at 13:04

## 2025-04-26 RX ADMIN — MIRTAZAPINE 15 MG: 15 TABLET, FILM COATED ORAL at 20:53

## 2025-04-26 RX ADMIN — HYDROMORPHONE HYDROCHLORIDE 0.6 MG: 1 INJECTION, SOLUTION INTRAMUSCULAR; INTRAVENOUS; SUBCUTANEOUS at 19:43

## 2025-04-26 RX ADMIN — CLONIDINE HYDROCHLORIDE 0.3 MG: 0.3 TABLET ORAL at 05:12

## 2025-04-26 RX ADMIN — HYDROMORPHONE HYDROCHLORIDE 0.6 MG: 1 INJECTION, SOLUTION INTRAMUSCULAR; INTRAVENOUS; SUBCUTANEOUS at 16:09

## 2025-04-26 RX ADMIN — HYDROMORPHONE HYDROCHLORIDE 0.6 MG: 1 INJECTION, SOLUTION INTRAMUSCULAR; INTRAVENOUS; SUBCUTANEOUS at 22:46

## 2025-04-26 RX ADMIN — HYDROMORPHONE HYDROCHLORIDE 0.6 MG: 1 INJECTION, SOLUTION INTRAMUSCULAR; INTRAVENOUS; SUBCUTANEOUS at 13:04

## 2025-04-26 RX ADMIN — CLONIDINE HYDROCHLORIDE 0.3 MG: 0.3 TABLET ORAL at 22:45

## 2025-04-26 RX ADMIN — DIPHENHYDRAMINE HYDROCHLORIDE 50 MG: 50 INJECTION, SOLUTION INTRAMUSCULAR; INTRAVENOUS at 09:45

## 2025-04-26 RX ADMIN — CLONIDINE HYDROCHLORIDE 0.3 MG: 0.3 TABLET ORAL at 13:04

## 2025-04-26 RX ADMIN — ATORVASTATIN CALCIUM 40 MG: 40 TABLET, FILM COATED ORAL at 09:45

## 2025-04-26 RX ADMIN — HYDROMORPHONE HYDROCHLORIDE 0.6 MG: 1 INJECTION, SOLUTION INTRAMUSCULAR; INTRAVENOUS; SUBCUTANEOUS at 05:08

## 2025-04-26 RX ADMIN — DIPHENHYDRAMINE HYDROCHLORIDE 50 MG: 50 INJECTION, SOLUTION INTRAMUSCULAR; INTRAVENOUS at 22:45

## 2025-04-26 RX ADMIN — DIPHENHYDRAMINE HYDROCHLORIDE 50 MG: 50 INJECTION, SOLUTION INTRAMUSCULAR; INTRAVENOUS at 16:09

## 2025-04-26 RX ADMIN — METOPROLOL TARTRATE 25 MG: 25 TABLET, FILM COATED ORAL at 09:44

## 2025-04-26 RX ADMIN — CARVEDILOL 6.25 MG: 6.25 TABLET, FILM COATED ORAL at 20:54

## 2025-04-26 RX ADMIN — ASPIRIN 81 MG: 81 TABLET, COATED ORAL at 09:44

## 2025-04-26 RX ADMIN — HYDROMORPHONE HYDROCHLORIDE 0.6 MG: 1 INJECTION, SOLUTION INTRAMUSCULAR; INTRAVENOUS; SUBCUTANEOUS at 09:45

## 2025-04-26 RX ADMIN — ACETAMINOPHEN 650 MG: 325 TABLET ORAL at 16:08

## 2025-04-26 RX ADMIN — CALCITRIOL CAPSULES 0.25 MCG 0.5 MCG: 0.25 CAPSULE ORAL at 09:45

## 2025-04-26 RX ADMIN — DIPHENHYDRAMINE HYDROCHLORIDE 50 MG: 50 INJECTION, SOLUTION INTRAMUSCULAR; INTRAVENOUS at 05:07

## 2025-04-26 RX ADMIN — DIPHENHYDRAMINE HYDROCHLORIDE 50 MG: 50 INJECTION, SOLUTION INTRAMUSCULAR; INTRAVENOUS at 19:43

## 2025-04-26 RX ADMIN — PREGABALIN 50 MG: 50 CAPSULE ORAL at 09:45

## 2025-04-26 ASSESSMENT — PAIN DESCRIPTION - DESCRIPTORS
DESCRIPTORS: ACHING
DESCRIPTORS: SHARP
DESCRIPTORS: ACHING
DESCRIPTORS: SHARP
DESCRIPTORS: SHARP
DESCRIPTORS: ACHING
DESCRIPTORS: ACHING

## 2025-04-26 ASSESSMENT — COGNITIVE AND FUNCTIONAL STATUS - GENERAL
DAILY ACTIVITIY SCORE: 24
DAILY ACTIVITIY SCORE: 24
MOBILITY SCORE: 24
MOBILITY SCORE: 24

## 2025-04-26 ASSESSMENT — PAIN SCALES - GENERAL
PAINLEVEL_OUTOF10: 10 - WORST POSSIBLE PAIN
PAINLEVEL_OUTOF10: 7
PAINLEVEL_OUTOF10: 6
PAINLEVEL_OUTOF10: 5 - MODERATE PAIN
PAINLEVEL_OUTOF10: 7
PAINLEVEL_OUTOF10: 10 - WORST POSSIBLE PAIN
PAINLEVEL_OUTOF10: 8
PAINLEVEL_OUTOF10: 7

## 2025-04-26 ASSESSMENT — PAIN - FUNCTIONAL ASSESSMENT
PAIN_FUNCTIONAL_ASSESSMENT: 0-10

## 2025-04-26 ASSESSMENT — PAIN DESCRIPTION - ORIENTATION
ORIENTATION: RIGHT

## 2025-04-26 ASSESSMENT — PAIN DESCRIPTION - LOCATION
LOCATION: GROIN
LOCATION: GROIN
LOCATION: LEG
LOCATION: LEG

## 2025-04-26 NOTE — ASSESSMENT & PLAN NOTE
Dialysis catheter site infection  Patient does have a history of bacteremia and endocarditis  Right groin dialysis catheter with erythema, edema, tenderness, drainage on admission   - Removed 4/24  - New catheter placed 4/24  Blood cultures negative to date  IV antibiotics stopped after line removal   Temporary catheter has been placed in the left groin without signs or symptoms of infection currently.   - Remove prior to discharge as this is her IV access for medications while admitted   ID recommending resuming vancomycin dwell after each dialysis session  Pain management    ESRD on HD  Left femoral temporary HD catheter placed 4/17  Potassium 6.1 on arrival - stat dialysis done   - improved   Continue home Retacrit, Calcitrol  Nephrology consulted  Receives Benadryl prior to HD for itching, continue    HTN  Continue metoprolol and clonidine  PRN IV hydralazine for SBP > 180  Monitor blood pressure close    Coronary artery disease  Continue aspirin and statin    Seizure  Seizure precautions  Continue Keppra     Anxiety/depression  Psych consulted, appreciate recs  Remeron and Atarax ordered    Anemia secondary to chronic renal disease  Continue home Retacrit  Transfuse for Hgb > 7  Stable     PLAN:  DVT prophylaxis: SCDs, patient had declined pharmacologic prophylaxis  Renal diet   CBC, PT, and BMP in the morning  Monitor on telemetry  Plan for discharge to home with no needs tomorrow if BP remains controlled       Addendum: Blood pressure remains high with a systolic in the 180s, patient still complaining of headache. Changed metoprolol to coreg BID with parameters. Will continue to monitor blood pressure overnight. Hopeful discharge home tomorrow if blood pressure stable.     Please continue current pain regimen: Dilaudid 0.6mg Q 3 hours, Benadryl 50mg IV Q 3 hours. May be given together. She has been tolerating and this is the regimen that has worked for her on previous admissions.     CODE STATUS: FULL  CODE

## 2025-04-26 NOTE — PROGRESS NOTES
"Batsheva Page is a 49 y.o. female on day 9 of admission presenting with Right foot infection.    Subjective   Patient is seen for end-stage kidney disease and hyperkalemia admitted with malfunctioning right femoral permacath which was changed patient was dialyzed yesterday she feels well she wants to go home blood pressure was high this morning however she just had her blood pressure medications       Objective     Physical Exam  Constitutional:       General: She is not in acute distress.     Appearance: Normal appearance. She is not toxic-appearing.   Neck:      Vascular: No carotid bruit.   Cardiovascular:      Heart sounds: No murmur heard.     No friction rub. No gallop.   Pulmonary:      Breath sounds: No wheezing, rhonchi or rales.   Abdominal:      Tenderness: There is no abdominal tenderness. There is no right CVA tenderness, left CVA tenderness or rebound.   Musculoskeletal:         General: No tenderness.      Cervical back: Neck supple.      Right lower leg: No edema.      Left lower leg: No edema.   Lymphadenopathy:      Cervical: No cervical adenopathy.         Last Recorded Vitals  Blood pressure (!) 179/94, pulse 73, temperature 37.4 °C (99.3 °F), temperature source Axillary, resp. rate 16, height 1.651 m (5' 5\"), weight 69.4 kg (153 lb), SpO2 95%.    Intake/Output last 3 Shifts:  I/O last 3 completed shifts:  In: 1175 (16.9 mL/kg) [P.O.:372; I.V.:403 (5.8 mL/kg); Other:400]  Out: 17031 (273.8 mL/kg) [Other:76252]  Weight: 69.4 kg   Current Medications[1]   Relevant Results    Results for orders placed or performed during the hospital encounter of 04/17/25 (from the past 96 hours)   CBC   Result Value Ref Range    WBC 6.1 4.4 - 11.3 x10*3/uL    nRBC 0.0 0.0 - 0.0 /100 WBCs    RBC 3.06 (L) 4.00 - 5.20 x10*6/uL    Hemoglobin 9.5 (L) 12.0 - 16.0 g/dL    Hematocrit 29.6 (L) 36.0 - 46.0 %    MCV 97 80 - 100 fL    MCH 31.0 26.0 - 34.0 pg    MCHC 32.1 32.0 - 36.0 g/dL    RDW 16.6 (H) 11.5 - 14.5 %    " Platelets 163 150 - 450 x10*3/uL   Renal Function Panel   Result Value Ref Range    Glucose 89 74 - 99 mg/dL    Sodium 137 136 - 145 mmol/L    Potassium 4.5 3.5 - 5.3 mmol/L    Chloride 96 (L) 98 - 107 mmol/L    Bicarbonate 27 21 - 32 mmol/L    Anion Gap 19 10 - 20 mmol/L    Urea Nitrogen 29 (H) 6 - 23 mg/dL    Creatinine 5.76 (H) 0.50 - 1.05 mg/dL    eGFR 8 (L) >60 mL/min/1.73m*2    Calcium 6.9 (L) 8.6 - 10.3 mg/dL    Phosphorus 5.8 (H) 2.5 - 4.9 mg/dL    Albumin 3.2 (L) 3.4 - 5.0 g/dL   Magnesium   Result Value Ref Range    Magnesium 1.94 1.60 - 2.40 mg/dL   Vancomycin   Result Value Ref Range    Vancomycin 28.6 (H) 5.0 - 20.0 ug/mL   hCG, quantitative, pregnancy   Result Value Ref Range    HCG, Beta-Quantitative 2 <5 mIU/mL   CBC   Result Value Ref Range    WBC 6.1 4.4 - 11.3 x10*3/uL    nRBC 0.0 0.0 - 0.0 /100 WBCs    RBC 3.37 (L) 4.00 - 5.20 x10*6/uL    Hemoglobin 10.2 (L) 12.0 - 16.0 g/dL    Hematocrit 32.5 (L) 36.0 - 46.0 %    MCV 96 80 - 100 fL    MCH 30.3 26.0 - 34.0 pg    MCHC 31.4 (L) 32.0 - 36.0 g/dL    RDW 16.7 (H) 11.5 - 14.5 %    Platelets 194 150 - 450 x10*3/uL   Renal Function Panel   Result Value Ref Range    Glucose 87 74 - 99 mg/dL    Sodium 138 136 - 145 mmol/L    Potassium 4.4 3.5 - 5.3 mmol/L    Chloride 98 98 - 107 mmol/L    Bicarbonate 27 21 - 32 mmol/L    Anion Gap 17 10 - 20 mmol/L    Urea Nitrogen 23 6 - 23 mg/dL    Creatinine 4.90 (H) 0.50 - 1.05 mg/dL    eGFR 10 (L) >60 mL/min/1.73m*2    Calcium 7.3 (L) 8.6 - 10.3 mg/dL    Phosphorus 4.4 2.5 - 4.9 mg/dL    Albumin 3.4 3.4 - 5.0 g/dL   Magnesium   Result Value Ref Range    Magnesium 1.93 1.60 - 2.40 mg/dL   CBC   Result Value Ref Range    WBC 6.6 4.4 - 11.3 x10*3/uL    nRBC 0.0 0.0 - 0.0 /100 WBCs    RBC 3.25 (L) 4.00 - 5.20 x10*6/uL    Hemoglobin 9.9 (L) 12.0 - 16.0 g/dL    Hematocrit 31.3 (L) 36.0 - 46.0 %    MCV 96 80 - 100 fL    MCH 30.5 26.0 - 34.0 pg    MCHC 31.6 (L) 32.0 - 36.0 g/dL    RDW 16.6 (H) 11.5 - 14.5 %    Platelets 199  150 - 450 x10*3/uL   Renal Function Panel   Result Value Ref Range    Glucose 71 (L) 74 - 99 mg/dL    Sodium 138 136 - 145 mmol/L    Potassium 4.6 3.5 - 5.3 mmol/L    Chloride 98 98 - 107 mmol/L    Bicarbonate 23 21 - 32 mmol/L    Anion Gap 22 (H) 10 - 20 mmol/L    Urea Nitrogen 36 (H) 6 - 23 mg/dL    Creatinine 6.92 (H) 0.50 - 1.05 mg/dL    eGFR 7 (L) >60 mL/min/1.73m*2    Calcium 6.8 (L) 8.6 - 10.3 mg/dL    Phosphorus 6.0 (H) 2.5 - 4.9 mg/dL    Albumin 3.2 (L) 3.4 - 5.0 g/dL   Magnesium   Result Value Ref Range    Magnesium 1.96 1.60 - 2.40 mg/dL   CBC   Result Value Ref Range    WBC 7.2 4.4 - 11.3 x10*3/uL    nRBC 0.0 0.0 - 0.0 /100 WBCs    RBC 3.41 (L) 4.00 - 5.20 x10*6/uL    Hemoglobin 10.8 (L) 12.0 - 16.0 g/dL    Hematocrit 33.0 (L) 36.0 - 46.0 %    MCV 97 80 - 100 fL    MCH 31.7 26.0 - 34.0 pg    MCHC 32.7 32.0 - 36.0 g/dL    RDW 16.9 (H) 11.5 - 14.5 %    Platelets 250 150 - 450 x10*3/uL   Renal Function Panel   Result Value Ref Range    Glucose 118 (H) 74 - 99 mg/dL    Sodium 137 136 - 145 mmol/L    Potassium 4.1 3.5 - 5.3 mmol/L    Chloride 97 (L) 98 - 107 mmol/L    Bicarbonate 27 21 - 32 mmol/L    Anion Gap 17 10 - 20 mmol/L    Urea Nitrogen 28 (H) 6 - 23 mg/dL    Creatinine 5.11 (H) 0.50 - 1.05 mg/dL    eGFR 10 (L) >60 mL/min/1.73m*2    Calcium 7.4 (L) 8.6 - 10.3 mg/dL    Phosphorus 5.4 (H) 2.5 - 4.9 mg/dL    Albumin 3.4 3.4 - 5.0 g/dL   Magnesium   Result Value Ref Range    Magnesium 1.96 1.60 - 2.40 mg/dL         Assessment & Plan:     End-stage renal disease okay discharge from renal point of view if her blood pressure is better today  Hyperkalemia solved  Infection of dialysis catheter site and malfunctioning dialysis catheter that was exchanged  Hypertension increase clonidine to 0.3 mg 3 times daily    Zhou Pedersen MD         [1]   Current Facility-Administered Medications:     acetaminophen (Tylenol) tablet 650 mg, 650 mg, oral, q6h PRN, Noelle Doss, APRN-CNP, 650 mg at 04/25/25 8772     albuterol 2.5 mg /3 mL (0.083 %) nebulizer solution 2.5 mg, 2.5 mg, nebulization, Once PRN, Jorge Suggs MD    aspirin EC tablet 81 mg, 81 mg, oral, Daily, ORI Kincaid-CNP, 81 mg at 04/26/25 0944    atorvastatin (Lipitor) tablet 40 mg, 40 mg, oral, Daily, ANGELA Kincaid, 40 mg at 04/26/25 0945    calcitriol (Rocaltrol) capsule 0.5 mcg, 0.5 mcg, oral, Daily, ANGELA Kincaid, 0.5 mcg at 04/26/25 0945    cloNIDine (Catapres) tablet 0.3 mg, 0.3 mg, oral, q8h KIMBERLY, Zhou Pedersen MD, 0.3 mg at 04/26/25 0512    diphenhydrAMINE (BENADryl) injection 50 mg, 50 mg, intravenous, q3h PRN, ANGELA Kincaid, 50 mg at 04/26/25 0945    epoetin brandy-epbx (Retacrit) injection 6,500 Units, 6,500 Units, subcutaneous, Once per day on Monday Wednesday Friday, ANGELA Kincaid, 6,500 Units at 04/25/25 1801    ergocalciferol (Vitamin D-2) capsule 1.25 mg, 1.25 mg, oral, Every Sunday, ANGELA Kincaid, 1.25 mg at 04/20/25 0814    fentaNYL PF (Sublimaze) injection 50 mcg, 50 mcg, intravenous, q5 min PRN, Jorge Suggs MD    heparin 1,000 unit/mL injection 1,000 Units, 1,000 Units, intra-catheter, After Dialysis, ANGELA Cheatham    heparin 1,000 unit/mL injection 1,000 Units, 1,000 Units, intra-catheter, After Dialysis, ANGELA Cheatham, 1,000 Units at 04/23/25 1600    hydrALAZINE (Apresoline) injection 5 mg, 5 mg, intravenous, q6h PRN, Vania Escalante, APRN-CNP, 5 mg at 04/25/25 1120    hydrALAZINE (Apresoline) injection 5 mg, 5 mg, intravenous, q30 min PRN, Jorge Suggs MD    HYDROmorphone (Dilaudid) injection 0.4 mg, 0.4 mg, intravenous, q5 min PRN, Jorge Suggs MD    HYDROmorphone (Dilaudid) injection 0.6 mg, 0.6 mg, intravenous, q3h PRN, Noelle Doss, APRN-CNP, 0.6 mg at 04/26/25 0945    hydrOXYzine HCL (Atarax) tablet 25 mg, 25 mg, oral, q6h PRN, ORI Michael-CNP, 25 mg at 04/25/25 1314    lactated Ringer's infusion, 100 mL/hr,  intravenous, Continuous, Jorge Suggs MD    levETIRAcetam (Keppra) tablet 750 mg, 750 mg, oral, Nightly, ANGELA Kincaid, 750 mg at 04/24/25 2114    lidocaine (Xylocaine) 10 mg/mL (1 %) injection 0.1 mL, 0.1 mL, subcutaneous, Once, Jorge Suggs MD    meperidine (PF) (Demerol) injection 12.5 mg, 12.5 mg, intravenous, q10 min PRN, Jorge Suggs MD    metoprolol tartrate (Lopressor) tablet 25 mg, 25 mg, oral, BID, ANGELA Kincaid, 25 mg at 04/26/25 0944    midazolam (Versed) injection 1 mg, 1 mg, intravenous, Once PRN, Jorge Suggs MD    mirtazapine (Remeron) tablet 15 mg, 15 mg, oral, Nightly, ANGELA Michael, 15 mg at 04/25/25 2021    mupirocin (Bactroban) 2 % ointment, , Topical, TID, ANGELA Huynh, Given at 04/23/25 2000    oxyCODONE-acetaminophen (Percocet)  mg per tablet 1 tablet, 1 tablet, oral, q6h PRN, ANGELA Kincaid, 1 tablet at 04/18/25 0632    oxyCODONE-acetaminophen (Percocet) 5-325 mg per tablet 1 tablet, 1 tablet, oral, q6h PRN, ANGELA Kincaid    pregabalin (Lyrica) capsule 50 mg, 50 mg, oral, BID, ANGELA Kincaid, 50 mg at 04/26/25 0945    promethazine (Phenergan) tablet 12.5 mg, 12.5 mg, oral, q6h PRN, ANGELA Huynh, 12.5 mg at 04/23/25 2001    sodium chloride 0.9 % bolus 200 mL, 200 mL, intravenous, q1h PRN, Vu Pedersen MD, Stopped at 04/24/25 1640    vancomycin 5 mg/mL in NS lock, 2 mL, intra-catheter, Once, Adama Soto MD, Restarted at 04/25/25 6364

## 2025-04-26 NOTE — PROGRESS NOTES
"Batsheva Page is a 49 y.o. female on day 9 of admission presenting with Right foot infection.    Subjective   Patient resting in bed. Awake/alert/oriented. Denies chest pain, shortness of breath, fevers, chills, nausea, or vomiting. Does still have pain to HD cath site. Hypertensive this morning. Wants to go home.        Objective     Physical Exam  HENT:      Head: Normocephalic and atraumatic.   Eyes:      Extraocular Movements: Extraocular movements intact.      Conjunctiva/sclera: Conjunctivae normal.   Cardiovascular:      Rate and Rhythm: Normal rate and regular rhythm.      Pulses: Normal pulses.      Heart sounds: Normal heart sounds.   Pulmonary:      Effort: Pulmonary effort is normal.      Breath sounds: Normal breath sounds.   Abdominal:      General: Bowel sounds are normal.      Palpations: Abdomen is soft.      Tenderness: There is no abdominal tenderness.   Musculoskeletal:         General: Normal range of motion.   Skin:     General: Skin is dry.   Neurological:      General: No focal deficit present.      Mental Status: She is alert and oriented to person, place, and time.         Last Recorded Vitals  Blood pressure (!) 179/94, pulse 73, temperature 37.4 °C (99.3 °F), temperature source Axillary, resp. rate 16, height 1.651 m (5' 5\"), weight 69.4 kg (153 lb), SpO2 95%.  Intake/Output last 3 Shifts:  I/O last 3 completed shifts:  In: 1175 (16.9 mL/kg) [P.O.:372; I.V.:403 (5.8 mL/kg); Other:400]  Out: 19725 (273.8 mL/kg) [Other:91760]  Weight: 69.4 kg     Relevant Results  Lab Results   Component Value Date    GLUCOSE 118 (H) 04/26/2025    CALCIUM 7.4 (L) 04/26/2025     04/26/2025    K 4.1 04/26/2025    CO2 27 04/26/2025    CL 97 (L) 04/26/2025    BUN 28 (H) 04/26/2025    CREATININE 5.11 (H) 04/26/2025      Lab Results   Component Value Date    WBC 7.2 04/26/2025    HGB 10.8 (L) 04/26/2025    HCT 33.0 (L) 04/26/2025    MCV 97 04/26/2025     04/26/2025    ECG 12 lead  Result Date: " 4/18/2025  Normal sinus rhythm Anterior infarct , age undetermined Abnormal ECG When compared with ECG of 14-DEC-2024 09:53, Anterior infarct is now Present T wave amplitude has increased in Lateral leads Confirmed by Sade Solomon ([id number]) on 4/18/2025 1:03:27 PM     BMP and CBC reviewed, Vitals reviewed-hypertenisve. Will repeat manual BP this afternoon, if stable, discharge later today    Assessment & Plan  Hyperkalemia    Dialysis catheter site infection  Patient does have a history of bacteremia and endocarditis  Right groin dialysis catheter with erythema, edema, tenderness, drainage on admission   - Removed 4/24  - New catheter placed 4/24  Blood cultures negative to date  IV antibiotics stopped after line removal   Temporary catheter has been placed in the left groin without signs or symptoms of infection currently.   - Remove prior to discharge as this is her IV access for medications while admitted   ID recommending resuming vancomycin dwell after each dialysis session  Pain management    ESRD on HD  Left femoral temporary HD catheter placed 4/17  Potassium 6.1 on arrival - stat dialysis done   - improved   Continue home Retacrit, Calcitrol  Nephrology consulted  Receives Benadryl prior to HD for itching, continue    HTN  Continue metoprolol and clonidine  PRN IV hydralazine for SBP > 180  Monitor blood pressure close    Coronary artery disease  Continue aspirin and statin    Seizure  Seizure precautions  Continue Keppra     Anxiety/depression  Psych consulted, appreciate recs  Remeron and Atarax ordered    Anemia secondary to chronic renal disease  Continue home Retacrit  Transfuse for Hgb > 7  Stable     PLAN:  DVT prophylaxis: SCDs, patient had declined pharmacologic prophylaxis  Renal diet   CBC, PT, and BMP in the morning  Monitor on telemetry  Plan for discharge to home with no needs tomorrow if BP remains controlled       Addendum: Blood pressure remains high with a systolic in the 180s, patient still  complaining of headache. Changed metoprolol to coreg BID with parameters. Will continue to monitor blood pressure overnight. Hopeful discharge home tomorrow if blood pressure stable.     Please continue current pain regimen: Dilaudid 0.6mg Q 3 hours, Benadryl 50mg IV Q 3 hours. May be given together. She has been tolerating and this is the regimen that has worked for her on previous admissions.     CODE STATUS: FULL CODE          Noelle Doss, APRN-CNP

## 2025-04-26 NOTE — NURSING NOTE
BSSR complete Batsheva resting with eyes closed breathing even and unlabored call light in reach bed alarm on for safety    General:

## 2025-04-26 NOTE — HOSPITAL COURSE
Hospital Course   Batsheva Page is a 49 y.o. year old female patient with past medical history of ESRD on hemodialysis, recurrent MSSA/MRSA bacteremia, endocarditis s/p aortic valve replacement and mitral valve replacement, HTN, HLD, seizure disorder, anemia, hyponatremia. Patient presented to [facility] emergency department 4/17 with complaints that her dialysis catheter may be infected. She complained of hurting at the site, redness, drainage. She did report receiving dialysis 4/16, however stated that her catheter had not been working intermittently and her dialysis sessions have been being interrupted.      ED Course:  On arrival to ED patient was afebrile T36.5.  Normotensive.  Normal sinus rhythm.  Oxygen saturation 100% on room air.  CBC unremarkable, no significant leukocytosis.  Chemistry showed elevated creatinine near baseline at 6.95, BUN 41.  Significant hyperkalemia with potassium 8.2.  Lactate normal.  Hyperkalemia was treated with insulin, dextrose, calcium and improved to 7.6.  Patient with right femoral tunneled dialysis catheter, did have redness and drainage at the site concerning for site infection therefore no urgent dialysis was initiated through this catheter.  She was given vancomycin and Zosyn due to concern for infection.  Patient was admitted to the ICU for new temporary dialysis catheter placement and emergent dialysis.     Interval ICU Events:  4/17: Admitted to the ICU with needs for emergent dialysis.  Right femoral tunneled dialysis catheter in place, but reportedly not working adequately.  Left femoral temporary dialysis catheter placed by ICU attending.  Possible cellulitis at the right femoral catheter site, but no signs of systemic infection.  Received emergent dialysis.  Consult placed to IR for tunneled dialysis catheter exchange.     Labs improved after HD, patient stabilized and transferred to SDU. She is now s/p tunneled catheter placement by IR. Has been being  followed by nephrology. Receiving dialysis during hospitalization. IV ATB stopped after line removal, Dr. Soto recommending continuing Vancomycin dwell on discharge. Recommendation to remove temp catheter, cleared by ID for discharge. She was hypertensive, medications adjusted per nephrology.

## 2025-04-26 NOTE — CARE PLAN
The patient's goals for the shift include      The clinical goals for the shift include pain control, monitor BP

## 2025-04-27 VITALS
DIASTOLIC BLOOD PRESSURE: 90 MMHG | WEIGHT: 153 LBS | BODY MASS INDEX: 25.49 KG/M2 | HEART RATE: 66 BPM | HEIGHT: 65 IN | SYSTOLIC BLOOD PRESSURE: 168 MMHG | OXYGEN SATURATION: 100 % | RESPIRATION RATE: 18 BRPM | TEMPERATURE: 98.1 F

## 2025-04-27 LAB
ALBUMIN SERPL BCP-MCNC: 3.3 G/DL (ref 3.4–5)
ANION GAP SERPL CALCULATED.3IONS-SCNC: 16 MMOL/L (ref 10–20)
BUN SERPL-MCNC: 38 MG/DL (ref 6–23)
CALCIUM SERPL-MCNC: 6.9 MG/DL (ref 8.6–10.3)
CHLORIDE SERPL-SCNC: 96 MMOL/L (ref 98–107)
CO2 SERPL-SCNC: 27 MMOL/L (ref 21–32)
CREAT SERPL-MCNC: 6.92 MG/DL (ref 0.5–1.05)
EGFRCR SERPLBLD CKD-EPI 2021: 7 ML/MIN/1.73M*2
ERYTHROCYTE [DISTWIDTH] IN BLOOD BY AUTOMATED COUNT: 16.5 % (ref 11.5–14.5)
GLUCOSE SERPL-MCNC: 108 MG/DL (ref 74–99)
HCT VFR BLD AUTO: 32 % (ref 36–46)
HGB BLD-MCNC: 10.2 G/DL (ref 12–16)
MAGNESIUM SERPL-MCNC: 1.99 MG/DL (ref 1.6–2.4)
MCH RBC QN AUTO: 30.7 PG (ref 26–34)
MCHC RBC AUTO-ENTMCNC: 31.9 G/DL (ref 32–36)
MCV RBC AUTO: 96 FL (ref 80–100)
NRBC BLD-RTO: 0 /100 WBCS (ref 0–0)
PHOSPHATE SERPL-MCNC: 6.1 MG/DL (ref 2.5–4.9)
PLATELET # BLD AUTO: 220 X10*3/UL (ref 150–450)
POTASSIUM SERPL-SCNC: 4.1 MMOL/L (ref 3.5–5.3)
RBC # BLD AUTO: 3.32 X10*6/UL (ref 4–5.2)
SODIUM SERPL-SCNC: 135 MMOL/L (ref 136–145)
WBC # BLD AUTO: 6.4 X10*3/UL (ref 4.4–11.3)

## 2025-04-27 PROCEDURE — 83735 ASSAY OF MAGNESIUM: CPT | Performed by: NURSE PRACTITIONER

## 2025-04-27 PROCEDURE — 2500000001 HC RX 250 WO HCPCS SELF ADMINISTERED DRUGS (ALT 637 FOR MEDICARE OP): Performed by: NURSE PRACTITIONER

## 2025-04-27 PROCEDURE — 2500000004 HC RX 250 GENERAL PHARMACY W/ HCPCS (ALT 636 FOR OP/ED): Performed by: NURSE PRACTITIONER

## 2025-04-27 PROCEDURE — 36415 COLL VENOUS BLD VENIPUNCTURE: CPT | Performed by: NURSE PRACTITIONER

## 2025-04-27 PROCEDURE — 2500000004 HC RX 250 GENERAL PHARMACY W/ HCPCS (ALT 636 FOR OP/ED): Mod: JZ | Performed by: NURSE PRACTITIONER

## 2025-04-27 PROCEDURE — 2500000001 HC RX 250 WO HCPCS SELF ADMINISTERED DRUGS (ALT 637 FOR MEDICARE OP): Performed by: INTERNAL MEDICINE

## 2025-04-27 PROCEDURE — 99239 HOSP IP/OBS DSCHRG MGMT >30: CPT | Performed by: NURSE PRACTITIONER

## 2025-04-27 PROCEDURE — 85027 COMPLETE CBC AUTOMATED: CPT | Performed by: NURSE PRACTITIONER

## 2025-04-27 PROCEDURE — 80069 RENAL FUNCTION PANEL: CPT | Performed by: NURSE PRACTITIONER

## 2025-04-27 RX ORDER — CARVEDILOL 12.5 MG/1
12.5 TABLET ORAL 2 TIMES DAILY
Status: DISCONTINUED | OUTPATIENT
Start: 2025-04-27 | End: 2025-04-27

## 2025-04-27 RX ORDER — CLONIDINE HYDROCHLORIDE 0.3 MG/1
0.3 TABLET ORAL EVERY 8 HOURS SCHEDULED
Qty: 90 TABLET | Refills: 2 | Status: SHIPPED | OUTPATIENT
Start: 2025-04-27

## 2025-04-27 RX ORDER — CARVEDILOL 12.5 MG/1
12.5 TABLET ORAL 2 TIMES DAILY
Status: DISCONTINUED | OUTPATIENT
Start: 2025-04-27 | End: 2025-04-27 | Stop reason: HOSPADM

## 2025-04-27 RX ORDER — HEPARIN SODIUM 1000 [USP'U]/ML
1000 INJECTION, SOLUTION INTRAVENOUS; SUBCUTANEOUS
Status: CANCELLED | OUTPATIENT
Start: 2025-04-28

## 2025-04-27 RX ORDER — CARVEDILOL 12.5 MG/1
12.5 TABLET ORAL 2 TIMES DAILY
Qty: 60 TABLET | Refills: 2 | Status: SHIPPED | OUTPATIENT
Start: 2025-04-27

## 2025-04-27 RX ADMIN — DIPHENHYDRAMINE HYDROCHLORIDE 50 MG: 50 INJECTION, SOLUTION INTRAMUSCULAR; INTRAVENOUS at 16:03

## 2025-04-27 RX ADMIN — HYDROMORPHONE HYDROCHLORIDE 0.6 MG: 1 INJECTION, SOLUTION INTRAMUSCULAR; INTRAVENOUS; SUBCUTANEOUS at 16:03

## 2025-04-27 RX ADMIN — DIPHENHYDRAMINE HYDROCHLORIDE 50 MG: 50 INJECTION, SOLUTION INTRAMUSCULAR; INTRAVENOUS at 04:54

## 2025-04-27 RX ADMIN — HYDROMORPHONE HYDROCHLORIDE 0.6 MG: 1 INJECTION, SOLUTION INTRAMUSCULAR; INTRAVENOUS; SUBCUTANEOUS at 04:54

## 2025-04-27 RX ADMIN — HYDROMORPHONE HYDROCHLORIDE 0.6 MG: 1 INJECTION, SOLUTION INTRAMUSCULAR; INTRAVENOUS; SUBCUTANEOUS at 12:58

## 2025-04-27 RX ADMIN — PREGABALIN 50 MG: 50 CAPSULE ORAL at 09:23

## 2025-04-27 RX ADMIN — DIPHENHYDRAMINE HYDROCHLORIDE 50 MG: 50 INJECTION, SOLUTION INTRAMUSCULAR; INTRAVENOUS at 09:20

## 2025-04-27 RX ADMIN — CLONIDINE HYDROCHLORIDE 0.3 MG: 0.3 TABLET ORAL at 13:01

## 2025-04-27 RX ADMIN — DIPHENHYDRAMINE HYDROCHLORIDE 50 MG: 50 INJECTION, SOLUTION INTRAMUSCULAR; INTRAVENOUS at 01:49

## 2025-04-27 RX ADMIN — CARVEDILOL 12.5 MG: 12.5 TABLET, FILM COATED ORAL at 13:01

## 2025-04-27 RX ADMIN — CALCITRIOL CAPSULES 0.25 MCG 0.5 MCG: 0.25 CAPSULE ORAL at 09:20

## 2025-04-27 RX ADMIN — ASPIRIN 81 MG: 81 TABLET, COATED ORAL at 09:20

## 2025-04-27 RX ADMIN — CLONIDINE HYDROCHLORIDE 0.3 MG: 0.3 TABLET ORAL at 05:01

## 2025-04-27 RX ADMIN — ERGOCALCIFEROL 1.25 MG: 1.25 CAPSULE ORAL at 09:31

## 2025-04-27 RX ADMIN — ATORVASTATIN CALCIUM 40 MG: 40 TABLET, FILM COATED ORAL at 09:20

## 2025-04-27 RX ADMIN — DIPHENHYDRAMINE HYDROCHLORIDE 50 MG: 50 INJECTION, SOLUTION INTRAMUSCULAR; INTRAVENOUS at 12:58

## 2025-04-27 RX ADMIN — HYDROMORPHONE HYDROCHLORIDE 0.6 MG: 1 INJECTION, SOLUTION INTRAMUSCULAR; INTRAVENOUS; SUBCUTANEOUS at 09:19

## 2025-04-27 RX ADMIN — HYDROMORPHONE HYDROCHLORIDE 0.6 MG: 1 INJECTION, SOLUTION INTRAMUSCULAR; INTRAVENOUS; SUBCUTANEOUS at 01:49

## 2025-04-27 ASSESSMENT — PAIN SCALES - GENERAL
PAINLEVEL_OUTOF10: 10 - WORST POSSIBLE PAIN
PAINLEVEL_OUTOF10: 10 - WORST POSSIBLE PAIN
PAINLEVEL_OUTOF10: 0 - NO PAIN
PAINLEVEL_OUTOF10: 0 - NO PAIN
PAINLEVEL_OUTOF10: 10 - WORST POSSIBLE PAIN

## 2025-04-27 ASSESSMENT — COGNITIVE AND FUNCTIONAL STATUS - GENERAL
CLIMB 3 TO 5 STEPS WITH RAILING: A LITTLE
DAILY ACTIVITIY SCORE: 24
MOBILITY SCORE: 23

## 2025-04-27 ASSESSMENT — PAIN DESCRIPTION - DESCRIPTORS: DESCRIPTORS: ACHING

## 2025-04-27 ASSESSMENT — PAIN - FUNCTIONAL ASSESSMENT
PAIN_FUNCTIONAL_ASSESSMENT: 0-10

## 2025-04-27 ASSESSMENT — PAIN DESCRIPTION - ORIENTATION
ORIENTATION: RIGHT

## 2025-04-27 ASSESSMENT — PAIN DESCRIPTION - LOCATION
LOCATION: GROIN

## 2025-04-27 NOTE — NURSING NOTE
Brie resting rating pain 10 at catheter site  declined to percocet   IV dilaudid and IV benadryl given as ordered call light in reach

## 2025-04-27 NOTE — NURSING NOTE
I have reached out several times for removal of dialysis cath to ICU  No one has been available   Call to nursing supervisor

## 2025-04-27 NOTE — CARE PLAN
The patient's goals for the shift include      The clinical goals for the shift include adequate pain control    Over the shift, the patient did not make progress toward the following goals. Barriers to progression include . Recommendations to address these barriers include   Problem: Pain - Adult  Goal: Verbalizes/displays adequate comfort level or baseline comfort level  Outcome: Progressing     Problem: Safety - Adult  Goal: Free from fall injury  Outcome: Progressing     Problem: Discharge Planning  Goal: Discharge to home or other facility with appropriate resources  Outcome: Progressing     Problem: Chronic Conditions and Co-morbidities  Goal: Patient's chronic conditions and co-morbidity symptoms are monitored and maintained or improved  Outcome: Progressing     Problem: Nutrition  Goal: Nutrient intake appropriate for maintaining nutritional needs  Outcome: Progressing     Problem: Pain  Goal: Takes deep breaths with improved pain control throughout the shift  Outcome: Progressing  Goal: Turns in bed with improved pain control throughout the shift  Outcome: Progressing  Goal: Walks with improved pain control throughout the shift  Outcome: Progressing  Goal: Performs ADL's with improved pain control throughout shift  Outcome: Progressing  Goal: Participates in PT with improved pain control throughout the shift  Outcome: Progressing  Goal: Free from opioid side effects throughout the shift  Outcome: Progressing  Goal: Free from acute confusion related to pain meds throughout the shift  Outcome: Progressing     Problem: Skin  Goal: Decreased wound size/increased tissue granulation at next dressing change  Outcome: Progressing  Goal: Participates in plan/prevention/treatment measures  Outcome: Progressing  Goal: Prevent/manage excess moisture  Outcome: Progressing  Goal: Prevent/minimize sheer/friction injuries  Outcome: Progressing  Goal: Promote/optimize nutrition  Outcome: Progressing  Goal: Promote skin  healing  Outcome: Progressing   .

## 2025-04-27 NOTE — NURSING NOTE
Pt medicated for c/o pain in Rt groin with dilaudid, and benadryl for itching. Fluid given to pt. Call light in reach.

## 2025-04-27 NOTE — NURSING NOTE
Brie medicated with dilaudid and IV benadryl at this time prior to IV access being removed per her request and Noelle Doss CNP stated it was ok to give prior to IV access being removed and give some time after before discharging Brie   Nursing supervisor has been called to remove left groin dialysis access

## 2025-04-27 NOTE — DISCHARGE SUMMARY
Discharge Diagnosis  Right foot infection    Issues Requiring Follow-Up  Follow up with ID, nephrology, PCP outpatient    Test Results Pending At Discharge  Pending Labs       No current pending labs.            Hospital Course  Hospital Course   Batsheva Page is a 49 y.o. year old female patient with past medical history of ESRD on hemodialysis, recurrent MSSA/MRSA bacteremia, endocarditis s/p aortic valve replacement and mitral valve replacement, HTN, HLD, seizure disorder, anemia, hyponatremia. Patient presented to [facility] emergency department 4/17 with complaints that her dialysis catheter may be infected. She complained of hurting at the site, redness, drainage. She did report receiving dialysis 4/16, however stated that her catheter had not been working intermittently and her dialysis sessions have been being interrupted.      ED Course:  On arrival to ED patient was afebrile T36.5.  Normotensive.  Normal sinus rhythm.  Oxygen saturation 100% on room air.  CBC unremarkable, no significant leukocytosis.  Chemistry showed elevated creatinine near baseline at 6.95, BUN 41.  Significant hyperkalemia with potassium 8.2.  Lactate normal.  Hyperkalemia was treated with insulin, dextrose, calcium and improved to 7.6.  Patient with right femoral tunneled dialysis catheter, did have redness and drainage at the site concerning for site infection therefore no urgent dialysis was initiated through this catheter.  She was given vancomycin and Zosyn due to concern for infection.  Patient was admitted to the ICU for new temporary dialysis catheter placement and emergent dialysis.     Interval ICU Events:  4/17: Admitted to the ICU with needs for emergent dialysis.  Right femoral tunneled dialysis catheter in place, but reportedly not working adequately.  Left femoral temporary dialysis catheter placed by ICU attending.  Possible cellulitis at the right femoral catheter site, but no signs of systemic infection.   Received emergent dialysis.  Consult placed to IR for tunneled dialysis catheter exchange.     Labs improved after HD, patient stabilized and transferred to SDU. She is now s/p tunneled catheter placement by IR. Has been being followed by nephrology. Receiving dialysis during hospitalization. IV ATB stopped after line removal, Dr. Soto recommending continuing Vancomycin dwell on discharge. Recommendation to remove temp catheter, cleared by ID for discharge. She was hypertensive, medications adjusted per nephrology.    Cleared by all consultants. Blood pressure improved. Stable for discharge home today.     Pertinent Physical Exam At Time of Discharge  Physical Exam  HENT:      Head: Normocephalic and atraumatic.   Eyes:      Extraocular Movements: Extraocular movements intact.      Conjunctiva/sclera: Conjunctivae normal.   Cardiovascular:      Rate and Rhythm: Normal rate and regular rhythm.      Pulses: Normal pulses.      Heart sounds: Normal heart sounds.   Pulmonary:      Effort: Pulmonary effort is normal.      Breath sounds: Normal breath sounds.   Abdominal:      General: Bowel sounds are normal.      Palpations: Abdomen is soft.      Tenderness: There is no abdominal tenderness.   Musculoskeletal:         General: Normal range of motion.   Skin:     General: Skin is dry.   Neurological:      General: No focal deficit present.      Mental Status: She is alert and oriented to person, place, and time.         Home Medications     Medication List      START taking these medications     carvedilol 12.5 mg tablet; Commonly known as: Coreg; Take 1 tablet (12.5   mg) by mouth 2 times a day.   vancomycin 5 mg/mL in sodium chloride 0.9% solution; 2 mL by   intra-catheter route 3 (three) times a week.; Start taking on: April 28, 2025     CHANGE how you take these medications     cloNIDine 0.3 mg tablet; Commonly known as: Catapres; Take 1 tablet (0.3   mg) by mouth every 8 hours.; What changed: medication strength,  how much   to take     CONTINUE taking these medications     acetaminophen 325 mg tablet; Commonly known as: Tylenol; Take 2 tablets   (650 mg) by mouth every 6 hours as needed for mild pain (1 - 3).   aspirin 81 mg EC tablet; Take 1 tablet (81 mg) by mouth once daily.   atorvastatin 40 mg tablet; Commonly known as: Lipitor   calcitriol 0.25 mcg capsule; Commonly known as: Rocaltrol   epoetin brandy-epbx 20,000 unit/mL solution; Commonly known as: Retacrit;   Inject 6,440 Units under the skin 3 times a week. Do not start before   January 17, 2024.   ergocalciferol 1250 mcg (50,000 units) capsule; Commonly known as:   Vitamin D-2   hydrOXYzine HCL 25 mg tablet; Commonly known as: Atarax; Take 1 tablet   (25 mg) by mouth every 6 hours if needed for itching (1st line) for up to   7 days.   levETIRAcetam 500 mg tablet; Commonly known as: Keppra   oxyCODONE-acetaminophen 5-325 mg tablet; Commonly known as: Percocet;   Take 1 tablet by mouth every 6 hours as needed for moderate pain (4 - 6).   pregabalin 25 mg capsule; Commonly known as: Lyrica; Take 2 capsules (50   mg) by mouth 2 times a day.   promethazine 25 mg tablet; Commonly known as: Phenergan     STOP taking these medications     methocarbamol 750 mg tablet; Commonly known as: Robaxin   metoprolol tartrate 25 mg tablet; Commonly known as: Lopressor       Outpatient Follow-Up  No future appointments.    Time spent on discharge: 35 minutes    Noelle Doss, ORI-CNP

## 2025-04-27 NOTE — PROGRESS NOTES
"Batsheva Page is a 49 y.o. female on day 10 of admission presenting with Right foot infection.    Subjective   Patient is seen for end-stage kidney disease and hyperkalemia admitted with malfunctioning right femoral permacath which was changed patient was not discharged because of high blood pressure clinically she said she feels fine she has no complaints       Objective     Physical Exam  Constitutional:       General: She is not in acute distress.     Appearance: Normal appearance. She is not toxic-appearing.   Neck:      Vascular: No carotid bruit.   Cardiovascular:      Heart sounds: No murmur heard.     No friction rub. No gallop.   Pulmonary:      Breath sounds: No wheezing, rhonchi or rales.   Abdominal:      Tenderness: There is no abdominal tenderness. There is no right CVA tenderness, left CVA tenderness or rebound.   Musculoskeletal:         General: No tenderness.      Cervical back: Neck supple.      Right lower leg: No edema.      Left lower leg: No edema.   Lymphadenopathy:      Cervical: No cervical adenopathy.         Last Recorded Vitals  Blood pressure (!) 153/97, pulse 71, temperature 36.5 °C (97.7 °F), temperature source Oral, resp. rate 18, height 1.651 m (5' 5\"), weight 69.4 kg (153 lb), SpO2 95%.    Intake/Output last 3 Shifts:  I/O last 3 completed shifts:  In: 600 (8.6 mL/kg) [P.O.:600]  Out: - (0 mL/kg)   Weight: 69.4 kg   Current Medications[1]   Relevant Results    Results for orders placed or performed during the hospital encounter of 04/17/25 (from the past 96 hours)   CBC   Result Value Ref Range    WBC 6.1 4.4 - 11.3 x10*3/uL    nRBC 0.0 0.0 - 0.0 /100 WBCs    RBC 3.37 (L) 4.00 - 5.20 x10*6/uL    Hemoglobin 10.2 (L) 12.0 - 16.0 g/dL    Hematocrit 32.5 (L) 36.0 - 46.0 %    MCV 96 80 - 100 fL    MCH 30.3 26.0 - 34.0 pg    MCHC 31.4 (L) 32.0 - 36.0 g/dL    RDW 16.7 (H) 11.5 - 14.5 %    Platelets 194 150 - 450 x10*3/uL   Renal Function Panel   Result Value Ref Range    Glucose 87 " 74 - 99 mg/dL    Sodium 138 136 - 145 mmol/L    Potassium 4.4 3.5 - 5.3 mmol/L    Chloride 98 98 - 107 mmol/L    Bicarbonate 27 21 - 32 mmol/L    Anion Gap 17 10 - 20 mmol/L    Urea Nitrogen 23 6 - 23 mg/dL    Creatinine 4.90 (H) 0.50 - 1.05 mg/dL    eGFR 10 (L) >60 mL/min/1.73m*2    Calcium 7.3 (L) 8.6 - 10.3 mg/dL    Phosphorus 4.4 2.5 - 4.9 mg/dL    Albumin 3.4 3.4 - 5.0 g/dL   Magnesium   Result Value Ref Range    Magnesium 1.93 1.60 - 2.40 mg/dL   CBC   Result Value Ref Range    WBC 6.6 4.4 - 11.3 x10*3/uL    nRBC 0.0 0.0 - 0.0 /100 WBCs    RBC 3.25 (L) 4.00 - 5.20 x10*6/uL    Hemoglobin 9.9 (L) 12.0 - 16.0 g/dL    Hematocrit 31.3 (L) 36.0 - 46.0 %    MCV 96 80 - 100 fL    MCH 30.5 26.0 - 34.0 pg    MCHC 31.6 (L) 32.0 - 36.0 g/dL    RDW 16.6 (H) 11.5 - 14.5 %    Platelets 199 150 - 450 x10*3/uL   Renal Function Panel   Result Value Ref Range    Glucose 71 (L) 74 - 99 mg/dL    Sodium 138 136 - 145 mmol/L    Potassium 4.6 3.5 - 5.3 mmol/L    Chloride 98 98 - 107 mmol/L    Bicarbonate 23 21 - 32 mmol/L    Anion Gap 22 (H) 10 - 20 mmol/L    Urea Nitrogen 36 (H) 6 - 23 mg/dL    Creatinine 6.92 (H) 0.50 - 1.05 mg/dL    eGFR 7 (L) >60 mL/min/1.73m*2    Calcium 6.8 (L) 8.6 - 10.3 mg/dL    Phosphorus 6.0 (H) 2.5 - 4.9 mg/dL    Albumin 3.2 (L) 3.4 - 5.0 g/dL   Magnesium   Result Value Ref Range    Magnesium 1.96 1.60 - 2.40 mg/dL   CBC   Result Value Ref Range    WBC 7.2 4.4 - 11.3 x10*3/uL    nRBC 0.0 0.0 - 0.0 /100 WBCs    RBC 3.41 (L) 4.00 - 5.20 x10*6/uL    Hemoglobin 10.8 (L) 12.0 - 16.0 g/dL    Hematocrit 33.0 (L) 36.0 - 46.0 %    MCV 97 80 - 100 fL    MCH 31.7 26.0 - 34.0 pg    MCHC 32.7 32.0 - 36.0 g/dL    RDW 16.9 (H) 11.5 - 14.5 %    Platelets 250 150 - 450 x10*3/uL   Renal Function Panel   Result Value Ref Range    Glucose 118 (H) 74 - 99 mg/dL    Sodium 137 136 - 145 mmol/L    Potassium 4.1 3.5 - 5.3 mmol/L    Chloride 97 (L) 98 - 107 mmol/L    Bicarbonate 27 21 - 32 mmol/L    Anion Gap 17 10 - 20 mmol/L     Urea Nitrogen 28 (H) 6 - 23 mg/dL    Creatinine 5.11 (H) 0.50 - 1.05 mg/dL    eGFR 10 (L) >60 mL/min/1.73m*2    Calcium 7.4 (L) 8.6 - 10.3 mg/dL    Phosphorus 5.4 (H) 2.5 - 4.9 mg/dL    Albumin 3.4 3.4 - 5.0 g/dL   Magnesium   Result Value Ref Range    Magnesium 1.96 1.60 - 2.40 mg/dL   CBC   Result Value Ref Range    WBC 6.4 4.4 - 11.3 x10*3/uL    nRBC 0.0 0.0 - 0.0 /100 WBCs    RBC 3.32 (L) 4.00 - 5.20 x10*6/uL    Hemoglobin 10.2 (L) 12.0 - 16.0 g/dL    Hematocrit 32.0 (L) 36.0 - 46.0 %    MCV 96 80 - 100 fL    MCH 30.7 26.0 - 34.0 pg    MCHC 31.9 (L) 32.0 - 36.0 g/dL    RDW 16.5 (H) 11.5 - 14.5 %    Platelets 220 150 - 450 x10*3/uL   Renal Function Panel   Result Value Ref Range    Glucose 108 (H) 74 - 99 mg/dL    Sodium 135 (L) 136 - 145 mmol/L    Potassium 4.1 3.5 - 5.3 mmol/L    Chloride 96 (L) 98 - 107 mmol/L    Bicarbonate 27 21 - 32 mmol/L    Anion Gap 16 10 - 20 mmol/L    Urea Nitrogen 38 (H) 6 - 23 mg/dL    Creatinine 6.92 (H) 0.50 - 1.05 mg/dL    eGFR 7 (L) >60 mL/min/1.73m*2    Calcium 6.9 (L) 8.6 - 10.3 mg/dL    Phosphorus 6.1 (H) 2.5 - 4.9 mg/dL    Albumin 3.3 (L) 3.4 - 5.0 g/dL   Magnesium   Result Value Ref Range    Magnesium 1.99 1.60 - 2.40 mg/dL         Assessment & Plan:     End-stage renal disease dialysis tomorrow morning if not discharged  Hyperkalemia solved  Infection of dialysis catheter site and malfunctioning dialysis catheter that was exchanged  Hypertension increase clonidine to 0.3 mg 3 times daily patient's beta-blocker was switched to Coreg yesterday however blood pressure still on the high side I will increase the dose of Coreg and add Norvasc if needed    Zhou Pedersen MD         [1]   Current Facility-Administered Medications:     acetaminophen (Tylenol) tablet 650 mg, 650 mg, oral, q6h PRN, Noelle Doss, APRN-CNP, 650 mg at 04/26/25 1608    albuterol 2.5 mg /3 mL (0.083 %) nebulizer solution 2.5 mg, 2.5 mg, nebulization, Once PRN, Jorge Suggs MD    aspirin EC tablet 81  mg, 81 mg, oral, Daily, Noelle Doss APRN-CNP, 81 mg at 04/26/25 0944    atorvastatin (Lipitor) tablet 40 mg, 40 mg, oral, Daily, Noelle Doss APRN-CNP, 40 mg at 04/26/25 0945    calcitriol (Rocaltrol) capsule 0.5 mcg, 0.5 mcg, oral, Daily, ORI Kincaid-CNP, 0.5 mcg at 04/26/25 0945    carvedilol (Coreg) tablet 6.25 mg, 6.25 mg, oral, BID, Noelle Doss APRN-CNP, 6.25 mg at 04/26/25 2054    cloNIDine (Catapres) tablet 0.3 mg, 0.3 mg, oral, q8h KIMBERLY, Zhou Pedersen MD, 0.3 mg at 04/27/25 0501    diphenhydrAMINE (BENADryl) injection 50 mg, 50 mg, intravenous, q3h PRN, ORI Kincaid-CNP, 50 mg at 04/27/25 0454    epoetin brandy-epbx (Retacrit) injection 6,500 Units, 6,500 Units, subcutaneous, Once per day on Monday Wednesday Friday, ORI Kincaid-CNP, 6,500 Units at 04/25/25 1801    ergocalciferol (Vitamin D-2) capsule 1.25 mg, 1.25 mg, oral, Every Sunday, ORI Kincaid-CNP, 1.25 mg at 04/20/25 0814    heparin 1,000 unit/mL injection 1,000 Units, 1,000 Units, intra-catheter, After Dialysis, ORI Cheatham-CNP    heparin 1,000 unit/mL injection 1,000 Units, 1,000 Units, intra-catheter, After Dialysis, ORI Cheatham-CNP, 1,000 Units at 04/23/25 1600    hydrALAZINE (Apresoline) injection 5 mg, 5 mg, intravenous, q6h PRN, Vania Escalante, ORI-CNP, 5 mg at 04/25/25 1120    HYDROmorphone (Dilaudid) injection 0.4 mg, 0.4 mg, intravenous, q5 min PRN, Jorge Suggs MD    HYDROmorphone (Dilaudid) injection 0.6 mg, 0.6 mg, intravenous, q3h PRN, ANGELA Kincaid, 0.6 mg at 04/27/25 0454    hydrOXYzine HCL (Atarax) tablet 25 mg, 25 mg, oral, q6h PRN, HERBERT MichaelCNP, 25 mg at 04/25/25 1314    lactated Ringer's infusion, 100 mL/hr, intravenous, Continuous, Jorge Suggs MD    levETIRAcetam (Keppra) tablet 750 mg, 750 mg, oral, Nightly, ANGELA Kincaid, 750 mg at 04/24/25 2114    lidocaine (Xylocaine) 10 mg/mL (1 %) injection 0.1 mL, 0.1  mL, subcutaneous, Once, Jorge Suggs MD    mirtazapine (Remeron) tablet 15 mg, 15 mg, oral, Nightly, ANGELA Michael, 15 mg at 04/26/25 2053    mupirocin (Bactroban) 2 % ointment, , Topical, TID, ANGELA Huynh, Given at 04/23/25 2000    oxyCODONE-acetaminophen (Percocet)  mg per tablet 1 tablet, 1 tablet, oral, q6h PRN, ANGELA Kincaid, 1 tablet at 04/18/25 0632    oxyCODONE-acetaminophen (Percocet) 5-325 mg per tablet 1 tablet, 1 tablet, oral, q6h PRN, ANGELA Kincaid    pregabalin (Lyrica) capsule 50 mg, 50 mg, oral, BID, ANGELA Kincaid, 50 mg at 04/26/25 0945    promethazine (Phenergan) tablet 12.5 mg, 12.5 mg, oral, q6h PRN, ANGELA Huynh, 12.5 mg at 04/23/25 2001    sodium chloride 0.9 % bolus 200 mL, 200 mL, intravenous, q1h PRN, Vu Pedersen MD, Stopped at 04/24/25 1640    vancomycin 5 mg/mL in NS lock, 2 mL, intra-catheter, Once, Adama Soto MD, Restarted at 04/25/25 1044

## 2025-04-27 NOTE — SIGNIFICANT EVENT
Clarification, did not ask nursing to medicate with dilaudid and benadryl prior to discharge. Request patient be medicated for pain prior to dialysis catheter removal. Then wait some time after before discharging. Notified nurse to reiterate instruction.

## 2025-04-29 ENCOUNTER — PATIENT OUTREACH (OUTPATIENT)
Dept: CARE COORDINATION | Facility: CLINIC | Age: 50
End: 2025-04-29
Payer: MEDICARE

## 2025-04-29 NOTE — PROGRESS NOTES
[facility]  Admitted 4/17/2025  Discharged 4/27/2025  Dx: Right Femoral Dialysis catheter site infection - removed  S.p: new Right Groin Dialysis catheter    Vancomycin 3 x week after dialysis  Coreg increased 12.5mg twice a day  Clonidine 0.3mg three times day    No follow up scheduled at this time    Outreach call to patient to support a smooth transition of care from recent admission.  Patient states, she is doing okay. Discussed new dialysis site. She states, the area is sore but no drainage from site. Previous dialysis site is sore with a small amount of bloody drainage. No pus noted from either site. Patient toleration medication changes without any side effects. Reviewed discharge medications, discharge instructions, assessed social needs, and provided education on importance of follow-up appointment with provider.      Engagement  Call Start Time: 1549 (4/29/2025  3:49 PM)    Medications  Medications reviewed with patient/caregiver?: Yes (4/29/2025  3:49 PM)  Is the patient having any side effects they believe may be caused by any medication additions or changes?: No (4/29/2025  3:49 PM)  Does the patient have all medications ordered at discharge?: Yes (4/29/2025  3:49 PM)  Care Management Interventions: Provided patient education (4/29/2025  3:49 PM)  Prescription Comments: Discharged on Vancomycin, increased Coreg, increased Clonidine. Discontinued Robaxin and Metoprolol Tartrate (4/29/2025  3:49 PM)  Is the patient taking all medications as directed (includes completed medication regime)?: Yes (4/29/2025  3:49 PM)    Appointments  Does the patient have a primary care provider?: Yes (4/29/2025  3:49 PM)    Self Management  What is the home health agency?: not applicable (4/29/2025  3:49 PM)  What Durable Medical Equipment (DME) was ordered?: not applicable (4/29/2025  3:49 PM)    Patient Teaching  Does the patient have access to their discharge instructions?: Yes (4/29/2025  3:49 PM)  Care Management  Interventions: Reviewed instructions with patient (4/29/2025  3:49 PM)  What is the patient's perception of their health status since discharge?: Improving (4/29/2025  3:49 PM)  Is the patient/caregiver able to teach back the hierarchy of who to call/visit for symptoms/problems? PCP, Specialist, Home Health nurse, Urgent Care, ED, 911: Yes (4/29/2025  3:49 PM)    Will continue to monitor through transition period.    Demetria Martinez RN/CM  [facility] [place] [facility]  [phone]

## 2025-05-02 VITALS
HEIGHT: 65 IN | DIASTOLIC BLOOD PRESSURE: 90 MMHG | HEART RATE: 66 BPM | SYSTOLIC BLOOD PRESSURE: 168 MMHG | RESPIRATION RATE: 18 BRPM | TEMPERATURE: 98.1 F | WEIGHT: 153 LBS | BODY MASS INDEX: 25.49 KG/M2 | OXYGEN SATURATION: 100 %

## 2025-05-15 ENCOUNTER — PATIENT OUTREACH (OUTPATIENT)
Dept: CARE COORDINATION | Facility: CLINIC | Age: 50
End: 2025-05-15
Payer: MEDICARE

## 2025-05-15 NOTE — PROGRESS NOTES
Outreach call to patient to check in after hospital discharge to support smooth transition of care. No one answered the phone and no option to leave a message.  Will continue to follow.      Demetria Martinez RN/CM  [facility]  [phone]

## 2025-06-04 ENCOUNTER — PATIENT OUTREACH (OUTPATIENT)
Dept: CARE COORDINATION | Facility: CLINIC | Age: 50
End: 2025-06-04
Payer: MEDICARE

## 2025-06-04 NOTE — PROGRESS NOTES
Outreach call to patient to check in 30 days after hospital discharge to support smooth transition of care.  No answer and voicemail is full. No additional outreach needed at this time. CM will close case.    Demetria Martinez RN/CM  [facility] [place] [facility]  [phone]

## 2025-06-08 ENCOUNTER — HOSPITAL ENCOUNTER (INPATIENT)
Facility: HOSPITAL | Age: 50
End: 2025-06-08
Attending: INTERNAL MEDICINE | Admitting: INTERNAL MEDICINE
Payer: MEDICARE

## 2025-06-08 VITALS
DIASTOLIC BLOOD PRESSURE: 81 MMHG | BODY MASS INDEX: 25.49 KG/M2 | HEIGHT: 65 IN | OXYGEN SATURATION: 99 % | SYSTOLIC BLOOD PRESSURE: 149 MMHG | TEMPERATURE: 97.3 F | WEIGHT: 153 LBS | HEART RATE: 66 BPM | RESPIRATION RATE: 18 BRPM

## 2025-06-08 DIAGNOSIS — N18.6 ESRD (END STAGE RENAL DISEASE) ON DIALYSIS (MULTI): ICD-10-CM

## 2025-06-08 DIAGNOSIS — Z99.2 ESRD (END STAGE RENAL DISEASE) ON DIALYSIS (MULTI): ICD-10-CM

## 2025-06-08 DIAGNOSIS — T82.9XXA COMPLICATION ASSOCIATED WITH DIALYSIS CATHETER: Primary | ICD-10-CM

## 2025-06-08 DIAGNOSIS — I10 BENIGN ESSENTIAL HYPERTENSION: ICD-10-CM

## 2025-06-08 LAB
ALBUMIN SERPL BCP-MCNC: 4.4 G/DL (ref 3.4–5)
ALP SERPL-CCNC: 45 U/L (ref 33–110)
ALT SERPL W P-5'-P-CCNC: 7 U/L (ref 7–45)
ANION GAP SERPL CALCULATED.3IONS-SCNC: 22 MMOL/L (ref 10–20)
AST SERPL W P-5'-P-CCNC: 16 U/L (ref 9–39)
BACTERIA BLD CULT: NORMAL
BACTERIA BLD CULT: NORMAL
BASOPHILS # BLD AUTO: 0.03 X10*3/UL (ref 0–0.1)
BASOPHILS NFR BLD AUTO: 0.5 %
BILIRUB SERPL-MCNC: 0.5 MG/DL (ref 0–1.2)
BUN SERPL-MCNC: 44 MG/DL (ref 6–23)
CALCIUM SERPL-MCNC: 7.5 MG/DL (ref 8.6–10.3)
CHLORIDE SERPL-SCNC: 90 MMOL/L (ref 98–107)
CO2 SERPL-SCNC: 24 MMOL/L (ref 21–32)
CREAT SERPL-MCNC: 7.6 MG/DL (ref 0.5–1.05)
EGFRCR SERPLBLD CKD-EPI 2021: 6 ML/MIN/1.73M*2
EOSINOPHIL # BLD AUTO: 0.17 X10*3/UL (ref 0–0.7)
EOSINOPHIL NFR BLD AUTO: 2.9 %
ERYTHROCYTE [DISTWIDTH] IN BLOOD BY AUTOMATED COUNT: 15.9 % (ref 11.5–14.5)
GLUCOSE SERPL-MCNC: 90 MG/DL (ref 74–99)
HCT VFR BLD AUTO: 34 % (ref 36–46)
HGB BLD-MCNC: 10.6 G/DL (ref 12–16)
IMM GRANULOCYTES # BLD AUTO: 0.02 X10*3/UL (ref 0–0.7)
IMM GRANULOCYTES NFR BLD AUTO: 0.3 % (ref 0–0.9)
LACTATE SERPL-SCNC: 0.9 MMOL/L (ref 0.4–2)
LYMPHOCYTES # BLD AUTO: 0.65 X10*3/UL (ref 1.2–4.8)
LYMPHOCYTES NFR BLD AUTO: 11.2 %
MCH RBC QN AUTO: 31.1 PG (ref 26–34)
MCHC RBC AUTO-ENTMCNC: 31.2 G/DL (ref 32–36)
MCV RBC AUTO: 100 FL (ref 80–100)
MONOCYTES # BLD AUTO: 0.31 X10*3/UL (ref 0.1–1)
MONOCYTES NFR BLD AUTO: 5.4 %
NEUTROPHILS # BLD AUTO: 4.61 X10*3/UL (ref 1.2–7.7)
NEUTROPHILS NFR BLD AUTO: 79.7 %
NRBC BLD-RTO: 0 /100 WBCS (ref 0–0)
PLATELET # BLD AUTO: 147 X10*3/UL (ref 150–450)
POTASSIUM SERPL-SCNC: 5.2 MMOL/L (ref 3.5–5.3)
PROT SERPL-MCNC: 9.2 G/DL (ref 6.4–8.2)
RBC # BLD AUTO: 3.41 X10*6/UL (ref 4–5.2)
SODIUM SERPL-SCNC: 131 MMOL/L (ref 136–145)
WBC # BLD AUTO: 5.8 X10*3/UL (ref 4.4–11.3)

## 2025-06-08 PROCEDURE — 2500000004 HC RX 250 GENERAL PHARMACY W/ HCPCS (ALT 636 FOR OP/ED): Performed by: NURSE PRACTITIONER

## 2025-06-08 PROCEDURE — 80053 COMPREHEN METABOLIC PANEL: CPT

## 2025-06-08 PROCEDURE — 36415 COLL VENOUS BLD VENIPUNCTURE: CPT

## 2025-06-08 PROCEDURE — 1200000002 HC GENERAL ROOM WITH TELEMETRY DAILY

## 2025-06-08 PROCEDURE — 99222 1ST HOSP IP/OBS MODERATE 55: CPT | Performed by: NURSE PRACTITIONER

## 2025-06-08 PROCEDURE — 96365 THER/PROPH/DIAG IV INF INIT: CPT

## 2025-06-08 PROCEDURE — 85025 COMPLETE CBC W/AUTO DIFF WBC: CPT

## 2025-06-08 PROCEDURE — 83605 ASSAY OF LACTIC ACID: CPT

## 2025-06-08 PROCEDURE — 0J2TXYZ CHANGE OTHER DEVICE IN TRUNK SUBCUTANEOUS TISSUE AND FASCIA, EXTERNAL APPROACH: ICD-10-PCS | Performed by: INTERNAL MEDICINE

## 2025-06-08 PROCEDURE — 87075 CULTR BACTERIA EXCEPT BLOOD: CPT | Mod: WESLAB

## 2025-06-08 PROCEDURE — 2500000004 HC RX 250 GENERAL PHARMACY W/ HCPCS (ALT 636 FOR OP/ED)

## 2025-06-08 PROCEDURE — 2500000001 HC RX 250 WO HCPCS SELF ADMINISTERED DRUGS (ALT 637 FOR MEDICARE OP): Performed by: NURSE PRACTITIONER

## 2025-06-08 PROCEDURE — 96375 TX/PRO/DX INJ NEW DRUG ADDON: CPT

## 2025-06-08 PROCEDURE — 87040 BLOOD CULTURE FOR BACTERIA: CPT | Mod: WESLAB

## 2025-06-08 PROCEDURE — 99285 EMERGENCY DEPT VISIT HI MDM: CPT | Mod: 25 | Performed by: INTERNAL MEDICINE

## 2025-06-08 RX ORDER — PREGABALIN 75 MG/1
75 CAPSULE ORAL DAILY
Status: ACTIVE | OUTPATIENT
Start: 2025-06-09

## 2025-06-08 RX ORDER — HYDROXYZINE HYDROCHLORIDE 25 MG/1
25 TABLET, FILM COATED ORAL EVERY 6 HOURS PRN
Status: DISPENSED | OUTPATIENT
Start: 2025-06-08

## 2025-06-08 RX ORDER — CALCITRIOL 0.25 UG/1
0.5 CAPSULE ORAL DAILY
Status: DISPENSED | OUTPATIENT
Start: 2025-06-09

## 2025-06-08 RX ORDER — ASPIRIN 81 MG/1
81 TABLET ORAL DAILY
Status: DISCONTINUED | OUTPATIENT
Start: 2025-06-08 | End: 2025-06-08

## 2025-06-08 RX ORDER — VANCOMYCIN 1 G/200ML
1000 INJECTION, SOLUTION INTRAVENOUS ONCE
Status: COMPLETED | OUTPATIENT
Start: 2025-06-08 | End: 2025-06-08

## 2025-06-08 RX ORDER — DIPHENHYDRAMINE HYDROCHLORIDE 50 MG/ML
25 INJECTION, SOLUTION INTRAMUSCULAR; INTRAVENOUS DAILY PRN
Status: DISPENSED | OUTPATIENT
Start: 2025-06-08

## 2025-06-08 RX ORDER — VANCOMYCIN HYDROCHLORIDE 1 G/20ML
INJECTION, POWDER, LYOPHILIZED, FOR SOLUTION INTRAVENOUS DAILY PRN
Status: DISPENSED | OUTPATIENT
Start: 2025-06-08

## 2025-06-08 RX ORDER — HEPARIN SODIUM 5000 [USP'U]/ML
5000 INJECTION, SOLUTION INTRAVENOUS; SUBCUTANEOUS EVERY 12 HOURS
Status: DISPENSED | OUTPATIENT
Start: 2025-06-08

## 2025-06-08 RX ORDER — ATORVASTATIN CALCIUM 40 MG/1
40 TABLET, FILM COATED ORAL NIGHTLY
Status: DISPENSED | OUTPATIENT
Start: 2025-06-08

## 2025-06-08 RX ORDER — PANTOPRAZOLE SODIUM 40 MG/1
40 TABLET, DELAYED RELEASE ORAL
Status: ACTIVE | OUTPATIENT
Start: 2025-06-09

## 2025-06-08 RX ORDER — ASPIRIN 81 MG/1
81 TABLET ORAL DAILY
Status: ACTIVE | OUTPATIENT
Start: 2025-06-09

## 2025-06-08 RX ORDER — OXYCODONE AND ACETAMINOPHEN 5; 325 MG/1; MG/1
1 TABLET ORAL EVERY 6 HOURS PRN
Status: DISPENSED | OUTPATIENT
Start: 2025-06-08

## 2025-06-08 RX ORDER — ERGOCALCIFEROL 1.25 MG/1
1.25 CAPSULE ORAL
Status: DISPENSED | OUTPATIENT
Start: 2025-06-08

## 2025-06-08 RX ORDER — ACETAMINOPHEN 325 MG/1
650 TABLET ORAL EVERY 6 HOURS PRN
Status: ACTIVE | OUTPATIENT
Start: 2025-06-08

## 2025-06-08 RX ORDER — HYDRALAZINE HYDROCHLORIDE 20 MG/ML
10 INJECTION INTRAMUSCULAR; INTRAVENOUS ONCE
Status: COMPLETED | OUTPATIENT
Start: 2025-06-08 | End: 2025-06-08

## 2025-06-08 RX ORDER — DIPHENHYDRAMINE HCL 25 MG
25 TABLET ORAL ONCE
Status: DISCONTINUED | OUTPATIENT
Start: 2025-06-08 | End: 2025-06-08

## 2025-06-08 RX ORDER — CARVEDILOL 12.5 MG/1
12.5 TABLET ORAL 2 TIMES DAILY
Status: DISPENSED | OUTPATIENT
Start: 2025-06-08

## 2025-06-08 RX ORDER — DIPHENHYDRAMINE HYDROCHLORIDE 50 MG/ML
25 INJECTION, SOLUTION INTRAMUSCULAR; INTRAVENOUS ONCE
Status: COMPLETED | OUTPATIENT
Start: 2025-06-09 | End: 2025-06-09

## 2025-06-08 RX ADMIN — PIPERACILLIN SODIUM AND TAZOBACTAM SODIUM 2.25 G: 2; .25 INJECTION, SOLUTION INTRAVENOUS at 14:14

## 2025-06-08 RX ADMIN — HYDROXYZINE HYDROCHLORIDE 25 MG: 25 TABLET, FILM COATED ORAL at 21:00

## 2025-06-08 RX ADMIN — CLONIDINE HYDROCHLORIDE 0.3 MG: 0.2 TABLET ORAL at 21:01

## 2025-06-08 RX ADMIN — CARVEDILOL 12.5 MG: 12.5 TABLET, FILM COATED ORAL at 21:00

## 2025-06-08 RX ADMIN — VANCOMYCIN 1000 MG: 1 INJECTION, SOLUTION INTRAVENOUS at 14:43

## 2025-06-08 RX ADMIN — PIPERACILLIN SODIUM AND TAZOBACTAM SODIUM 2.25 G: 2; .25 INJECTION, SOLUTION INTRAVENOUS at 21:04

## 2025-06-08 RX ADMIN — DIPHENHYDRAMINE HYDROCHLORIDE 25 MG: 50 INJECTION, SOLUTION INTRAMUSCULAR; INTRAVENOUS at 15:36

## 2025-06-08 RX ADMIN — HYDROMORPHONE HYDROCHLORIDE 0.2 MG: 0.5 INJECTION, SOLUTION INTRAMUSCULAR; INTRAVENOUS; SUBCUTANEOUS at 21:02

## 2025-06-08 RX ADMIN — HYDROMORPHONE HYDROCHLORIDE 0.5 MG: 1 INJECTION, SOLUTION INTRAMUSCULAR; INTRAVENOUS; SUBCUTANEOUS at 14:14

## 2025-06-08 RX ADMIN — HYDROMORPHONE HYDROCHLORIDE 0.2 MG: 0.5 INJECTION, SOLUTION INTRAMUSCULAR; INTRAVENOUS; SUBCUTANEOUS at 17:00

## 2025-06-08 RX ADMIN — HYDRALAZINE HYDROCHLORIDE 10 MG: 20 INJECTION INTRAMUSCULAR; INTRAVENOUS at 17:18

## 2025-06-08 SDOH — SOCIAL STABILITY: SOCIAL INSECURITY: ARE YOU MARRIED, WIDOWED, DIVORCED, SEPARATED, NEVER MARRIED, OR LIVING WITH A PARTNER?: LIVING WITH PARTNER

## 2025-06-08 SDOH — SOCIAL STABILITY: SOCIAL INSECURITY: DOES ANYONE TRY TO KEEP YOU FROM HAVING/CONTACTING OTHER FRIENDS OR DOING THINGS OUTSIDE YOUR HOME?: NO

## 2025-06-08 SDOH — HEALTH STABILITY: PHYSICAL HEALTH: ON AVERAGE, HOW MANY DAYS PER WEEK DO YOU ENGAGE IN MODERATE TO STRENUOUS EXERCISE (LIKE A BRISK WALK)?: 0 DAYS

## 2025-06-08 SDOH — ECONOMIC STABILITY: FOOD INSECURITY: WITHIN THE PAST 12 MONTHS, YOU WORRIED THAT YOUR FOOD WOULD RUN OUT BEFORE YOU GOT THE MONEY TO BUY MORE.: NEVER TRUE

## 2025-06-08 SDOH — ECONOMIC STABILITY: HOUSING INSECURITY: IN THE LAST 12 MONTHS, WAS THERE A TIME WHEN YOU WERE NOT ABLE TO PAY THE MORTGAGE OR RENT ON TIME?: NO

## 2025-06-08 SDOH — SOCIAL STABILITY: SOCIAL INSECURITY: DO YOU FEEL ANYONE HAS EXPLOITED OR TAKEN ADVANTAGE OF YOU FINANCIALLY OR OF YOUR PERSONAL PROPERTY?: NO

## 2025-06-08 SDOH — SOCIAL STABILITY: SOCIAL INSECURITY: WITHIN THE LAST YEAR, HAVE YOU BEEN HUMILIATED OR EMOTIONALLY ABUSED IN OTHER WAYS BY YOUR PARTNER OR EX-PARTNER?: NO

## 2025-06-08 SDOH — ECONOMIC STABILITY: FOOD INSECURITY: WITHIN THE PAST 12 MONTHS, THE FOOD YOU BOUGHT JUST DIDN'T LAST AND YOU DIDN'T HAVE MONEY TO GET MORE.: NEVER TRUE

## 2025-06-08 SDOH — SOCIAL STABILITY: SOCIAL NETWORK: HOW OFTEN DO YOU ATTEND MEETINGS OF THE CLUBS OR ORGANIZATIONS YOU BELONG TO?: NEVER

## 2025-06-08 SDOH — ECONOMIC STABILITY: HOUSING INSECURITY: IN THE PAST 12 MONTHS, HOW MANY TIMES HAVE YOU MOVED WHERE YOU WERE LIVING?: 0

## 2025-06-08 SDOH — SOCIAL STABILITY: SOCIAL NETWORK: HOW OFTEN DO YOU ATTEND CHURCH OR RELIGIOUS SERVICES?: MORE THAN 4 TIMES PER YEAR

## 2025-06-08 SDOH — ECONOMIC STABILITY: HOUSING INSECURITY: AT ANY TIME IN THE PAST 12 MONTHS, WERE YOU HOMELESS OR LIVING IN A SHELTER (INCLUDING NOW)?: NO

## 2025-06-08 SDOH — SOCIAL STABILITY: SOCIAL NETWORK: HOW OFTEN DO YOU GET TOGETHER WITH FRIENDS OR RELATIVES?: MORE THAN THREE TIMES A WEEK

## 2025-06-08 SDOH — SOCIAL STABILITY: SOCIAL INSECURITY: ARE YOU OR HAVE YOU BEEN THREATENED OR ABUSED PHYSICALLY, EMOTIONALLY, OR SEXUALLY BY ANYONE?: NO

## 2025-06-08 SDOH — ECONOMIC STABILITY: INCOME INSECURITY: IN THE PAST 12 MONTHS HAS THE ELECTRIC, GAS, OIL, OR WATER COMPANY THREATENED TO SHUT OFF SERVICES IN YOUR HOME?: NO

## 2025-06-08 SDOH — HEALTH STABILITY: PHYSICAL HEALTH: ON AVERAGE, HOW MANY MINUTES DO YOU ENGAGE IN EXERCISE AT THIS LEVEL?: 0 MIN

## 2025-06-08 SDOH — ECONOMIC STABILITY: FOOD INSECURITY: HOW HARD IS IT FOR YOU TO PAY FOR THE VERY BASICS LIKE FOOD, HOUSING, MEDICAL CARE, AND HEATING?: NOT HARD AT ALL

## 2025-06-08 SDOH — SOCIAL STABILITY: SOCIAL INSECURITY: HAVE YOU HAD ANY THOUGHTS OF HARMING ANYONE ELSE?: NO

## 2025-06-08 SDOH — SOCIAL STABILITY: SOCIAL INSECURITY: WITHIN THE LAST YEAR, HAVE YOU BEEN AFRAID OF YOUR PARTNER OR EX-PARTNER?: NO

## 2025-06-08 SDOH — SOCIAL STABILITY: SOCIAL INSECURITY: ABUSE: ADULT

## 2025-06-08 SDOH — SOCIAL STABILITY: SOCIAL INSECURITY: DO YOU FEEL UNSAFE GOING BACK TO THE PLACE WHERE YOU ARE LIVING?: NO

## 2025-06-08 SDOH — SOCIAL STABILITY: SOCIAL INSECURITY: HAS ANYONE EVER THREATENED TO HURT YOUR FAMILY OR YOUR PETS?: NO

## 2025-06-08 SDOH — ECONOMIC STABILITY: TRANSPORTATION INSECURITY: IN THE PAST 12 MONTHS, HAS LACK OF TRANSPORTATION KEPT YOU FROM MEDICAL APPOINTMENTS OR FROM GETTING MEDICATIONS?: NO

## 2025-06-08 SDOH — SOCIAL STABILITY: SOCIAL INSECURITY: HAVE YOU HAD THOUGHTS OF HARMING ANYONE ELSE?: NO

## 2025-06-08 SDOH — SOCIAL STABILITY: SOCIAL INSECURITY: ARE THERE ANY APPARENT SIGNS OF INJURIES/BEHAVIORS THAT COULD BE RELATED TO ABUSE/NEGLECT?: NO

## 2025-06-08 SDOH — SOCIAL STABILITY: SOCIAL INSECURITY: WERE YOU ABLE TO COMPLETE ALL THE BEHAVIORAL HEALTH SCREENINGS?: YES

## 2025-06-08 ASSESSMENT — COGNITIVE AND FUNCTIONAL STATUS - GENERAL
MOBILITY SCORE: 24
DAILY ACTIVITIY SCORE: 24
PATIENT BASELINE BEDBOUND: NO

## 2025-06-08 ASSESSMENT — ENCOUNTER SYMPTOMS: CHILLS: 1

## 2025-06-08 ASSESSMENT — PAIN DESCRIPTION - DESCRIPTORS
DESCRIPTORS: SHARP

## 2025-06-08 ASSESSMENT — ACTIVITIES OF DAILY LIVING (ADL)
HEARING - RIGHT EAR: FUNCTIONAL
TOILETING: INDEPENDENT
GROOMING: INDEPENDENT
DRESSING YOURSELF: INDEPENDENT
JUDGMENT_ADEQUATE_SAFELY_COMPLETE_DAILY_ACTIVITIES: YES
WALKS IN HOME: INDEPENDENT
ADEQUATE_TO_COMPLETE_ADL: YES
FEEDING YOURSELF: INDEPENDENT
PATIENT'S MEMORY ADEQUATE TO SAFELY COMPLETE DAILY ACTIVITIES?: YES
LACK_OF_TRANSPORTATION: NO
BATHING: INDEPENDENT
LACK_OF_TRANSPORTATION: NO
HEARING - LEFT EAR: FUNCTIONAL

## 2025-06-08 ASSESSMENT — PAIN - FUNCTIONAL ASSESSMENT
PAIN_FUNCTIONAL_ASSESSMENT: 0-10

## 2025-06-08 ASSESSMENT — COLUMBIA-SUICIDE SEVERITY RATING SCALE - C-SSRS
1. IN THE PAST MONTH, HAVE YOU WISHED YOU WERE DEAD OR WISHED YOU COULD GO TO SLEEP AND NOT WAKE UP?: NO
6. HAVE YOU EVER DONE ANYTHING, STARTED TO DO ANYTHING, OR PREPARED TO DO ANYTHING TO END YOUR LIFE?: NO
2. HAVE YOU ACTUALLY HAD ANY THOUGHTS OF KILLING YOURSELF?: NO

## 2025-06-08 ASSESSMENT — PAIN SCALES - GENERAL
PAINLEVEL_OUTOF10: 7
PAINLEVEL_OUTOF10: 7
PAINLEVEL_OUTOF10: 6
PAINLEVEL_OUTOF10: 10 - WORST POSSIBLE PAIN
PAINLEVEL_OUTOF10: 10 - WORST POSSIBLE PAIN
PAINLEVEL_OUTOF10: 8
PAINLEVEL_OUTOF10: 10 - WORST POSSIBLE PAIN

## 2025-06-08 ASSESSMENT — PAIN DESCRIPTION - ORIENTATION: ORIENTATION: RIGHT

## 2025-06-08 ASSESSMENT — LIFESTYLE VARIABLES
HOW OFTEN DO YOU HAVE A DRINK CONTAINING ALCOHOL: NEVER
SUBSTANCE_ABUSE_PAST_12_MONTHS: NO
PRESCIPTION_ABUSE_PAST_12_MONTHS: NO
AUDIT-C TOTAL SCORE: 0
HOW OFTEN DO YOU HAVE 6 OR MORE DRINKS ON ONE OCCASION: NEVER
HOW MANY STANDARD DRINKS CONTAINING ALCOHOL DO YOU HAVE ON A TYPICAL DAY: PATIENT DOES NOT DRINK
AUDIT-C TOTAL SCORE: 0
SKIP TO QUESTIONS 9-10: 1

## 2025-06-08 ASSESSMENT — PATIENT HEALTH QUESTIONNAIRE - PHQ9
1. LITTLE INTEREST OR PLEASURE IN DOING THINGS: NOT AT ALL
SUM OF ALL RESPONSES TO PHQ9 QUESTIONS 1 & 2: 0
2. FEELING DOWN, DEPRESSED OR HOPELESS: NOT AT ALL

## 2025-06-08 ASSESSMENT — PAIN DESCRIPTION - LOCATION: LOCATION: GROIN

## 2025-06-08 NOTE — SIGNIFICANT EVENT
06/08/25 1700   Provider Notification   Reason for Communication Abnormal vitals   Provider Name Zack/Orly   Provider Role Attending physician   Method of Communication Secure Text   Details of Communication ok so bp went down to 172/93, I waited 45 min and now its back up to 192/85 (this was 45 min post giving ivp hydralazine)   Response Waiting for response   Notification Time 7960

## 2025-06-08 NOTE — CARE PLAN
Problem: Pain - Adult  Goal: Verbalizes/displays adequate comfort level or baseline comfort level  Outcome: Progressing     Problem: Safety - Adult  Goal: Free from fall injury  Outcome: Progressing     Problem: Discharge Planning  Goal: Discharge to home or other facility with appropriate resources  Outcome: Progressing     Problem: Chronic Conditions and Co-morbidities  Goal: Patient's chronic conditions and co-morbidity symptoms are monitored and maintained or improved  Outcome: Progressing     Problem: Nutrition  Goal: Nutrient intake appropriate for maintaining nutritional needs  Outcome: Progressing   The patient's goals for the shift include pain control    The clinical goals for the shift include pain control

## 2025-06-08 NOTE — ED PROVIDER NOTES
HPI   Chief Complaint   Patient presents with    Vascular Access Problem       HPI  Patient is a 49-year-old female with history of ESRD on hemodialysis, recurrent MSSA/M RSA bacteremia, hypertension, hyperlipidemia presenting for evaluation of a hemodialysis catheter problem.  Patient states that on Friday at her hemodialysis catheter started to slip out and the lock is now exposed.  She states she has to tape it down or it will fall out.  She states she also has had increasing pain and tenderness at the site and noticed some redness and purulent drainage and is concerned it is infected.  She states it has been infected before in the past and she has had it replaced by interventional radiology and had to be admitted here.  She states that she does get dialysis Monday Wednesday Friday and did receive her Friday dialysis treatment.  She denies any fevers or chills but does admit to generalized malaise.  Otherwise has no acute complaints.      Patient History   Medical History[1]  Surgical History[2]  Family History[3]  Social History[4]    Physical Exam   ED Triage Vitals [06/08/25 1152]   Temperature Heart Rate Respirations BP   36.7 °C (98.1 °F) 84 18 (!) 170/93      Pulse Ox Temp Source Heart Rate Source Patient Position   100 % Tympanic Monitor Sitting      BP Location FiO2 (%)     Left arm --       Physical Exam  Vitals and nursing note reviewed.   Constitutional:       General: She is not in acute distress.     Appearance: She is well-developed.   HENT:      Head: Normocephalic and atraumatic.   Eyes:      Conjunctiva/sclera: Conjunctivae normal.   Cardiovascular:      Rate and Rhythm: Normal rate and regular rhythm.      Heart sounds: No murmur heard.  Pulmonary:      Effort: Pulmonary effort is normal. No respiratory distress.      Breath sounds: Normal breath sounds.   Musculoskeletal:         General: No swelling.      Cervical back: Neck supple.      Comments: Right femoral dialysis catheter in place with  surrounding erythema no purulent drainage, lock exposed   Skin:     General: Skin is warm and dry.      Capillary Refill: Capillary refill takes less than 2 seconds.   Neurological:      Mental Status: She is alert.   Psychiatric:         Mood and Affect: Mood normal.           ED Course & MDM   Diagnoses as of 06/08/25 2100   Complication associated with dialysis catheter                 No data recorded     [place] Coma Scale Score: 15 (06/08/25 1650 : Chelle Fabian RN)                           Medical Decision Making  Parts of this chart have been completed using voice recognition software. Please excuse any errors of transcription.  My thought process and reason for plan has been formulated from the time that I saw the patient until the time of disposition and is not specific to one specific moment during their visit and furthermore my MDM encompasses this entire chart and not only this text box.      HPI: Detailed above.    Exam: A medically appropriate exam performed, outlined above, given the known history and presentation.    History obtained from: Patient      Medications given during visit:  Medications   cloNIDine (Catapres) tablet 0.3 mg ( oral Canceled Entry 6/8/25 2100)   carvedilol (Coreg) tablet 12.5 mg (has no administration in time range)   calcitriol (Rocaltrol) capsule 0.5 mcg (has no administration in time range)   atorvastatin (Lipitor) tablet 40 mg (has no administration in time range)   acetaminophen (Tylenol) tablet 650 mg (has no administration in time range)   ergocalciferol (Vitamin D-2) capsule 1.25 mg (1.25 mg oral Not Given 6/8/25 1614)   hydrOXYzine HCL (Atarax) tablet 25 mg (has no administration in time range)   levETIRAcetam (Keppra) tablet 750 mg (has no administration in time range)   oxyCODONE-acetaminophen (Percocet) 5-325 mg per tablet 1 tablet ( oral Canceled Entry 6/8/25 1640)   pregabalin (Lyrica) capsule 75 mg (has no administration in time range)   vancomycin  (Vancocin) pharmacy to dose - pharmacy monitoring (has no administration in time range)   diphenhydrAMINE (BENADryl) injection 25 mg (25 mg intravenous Given 6/8/25 1536)   aspirin EC tablet 81 mg (has no administration in time range)   piperacillin-tazobactam (Zosyn) 2.25 g in dextrose (iso) IV 50 mL (has no administration in time range)   pantoprazole (ProtoNix) EC tablet 40 mg (has no administration in time range)   heparin (porcine) injection 5,000 Units (has no administration in time range)   HYDROmorphone (Dilaudid) injection 0.2 mg (0.2 mg intravenous Given 6/8/25 1700)   vancomycin (Xellia) 1,000 mg in diluent combination  mL (0 mg intravenous Stopped 6/8/25 1548)   piperacillin-tazobactam (Zosyn) 2.25 g in dextrose (iso) IV 50 mL (0 g intravenous Stopped 6/8/25 1445)   HYDROmorphone (Dilaudid) injection 0.5 mg (0.5 mg intravenous Given 6/8/25 1414)   hydrALAZINE (Apresoline) injection 10 mg (10 mg intravenous Given 6/8/25 1718)        Diagnostic/tests  Labs Reviewed   CBC WITH AUTO DIFFERENTIAL - Abnormal       Result Value    WBC 5.8      nRBC 0.0      RBC 3.41 (*)     Hemoglobin 10.6 (*)     Hematocrit 34.0 (*)           MCH 31.1      MCHC 31.2 (*)     RDW 15.9 (*)     Platelets 147 (*)     Neutrophils % 79.7      Immature Granulocytes %, Automated 0.3      Lymphocytes % 11.2      Monocytes % 5.4      Eosinophils % 2.9      Basophils % 0.5      Neutrophils Absolute 4.61      Immature Granulocytes Absolute, Automated 0.02      Lymphocytes Absolute 0.65 (*)     Monocytes Absolute 0.31      Eosinophils Absolute 0.17      Basophils Absolute 0.03     COMPREHENSIVE METABOLIC PANEL - Abnormal    Glucose 90      Sodium 131 (*)     Potassium 5.2      Chloride 90 (*)     Bicarbonate 24      Anion Gap 22 (*)     Urea Nitrogen 44 (*)     Creatinine 7.60 (*)     eGFR 6 (*)     Calcium 7.5 (*)     Albumin 4.4      Alkaline Phosphatase 45      Total Protein 9.2 (*)     AST 16      Bilirubin, Total 0.5       ALT 7     BLOOD CULTURE - Normal    Blood Culture Loaded on Instrument - Culture in progress     BLOOD CULTURE - Normal    Blood Culture Loaded on Instrument - Culture in progress     LACTATE - Normal    Lactate 0.9      Narrative:     Venipuncture immediately after or during the administration of Metamizole may lead to falsely low results. Testing should be performed immediately prior to Metamizole dosing.   TISSUE/WOUND CULTURE/SMEAR   CBC   BASIC METABOLIC PANEL   VANCOMYCIN      Consult to Interventional Radiology    (Results Pending)        Considerations/further MDM:  Patient is a 49-year-old female presenting for evaluation of vascular access problem    Patient awake alert nontoxic-appearing.  Vital signs are within normal limits.  She does have a hemodialysis catheter in her right femoral region with no purulent discharge identified though there is some surrounding erythema.  The lock is exposed on the hemodialysis catheter.  Lactate and blood cultures were obtained the patient was empirically started on vancomycin and Zosyn for concerns of hemodialysis catheter infection.  Laboratory workup is with evidence of the patient's end-stage renal disease but is otherwise unremarkable.  Lactate is within normal limits.  The patient is admitted to hospitalist service for further care.  She is updated on results and agreeable with plan of care.      Procedure  Procedures       [1]   Past Medical History:  Diagnosis Date    Aortic valve replaced 2022    CHF (congestive heart failure)     Chronic pain     Coronary artery disease     Disease of thyroid gland     ESRD (end stage renal disease) (Multi)     H/O mitral valve replacement 2013    and 2022    Heart disease     History of transfusion     Hypertension     Mitral valve regurgitation     Seizures (Multi)     Stroke (Multi)    [2]   Past Surgical History:  Procedure Laterality Date    AORTIC VALVE REPLACEMENT  09/26/2022    bioprosthetic    CORONARY ARTERY BYPASS  GRAFT      IR CVC  01/12/2024    exchange    IR CVC  05/07/2024    exchange    IR CVC  08/20/2024    removal    IR CVC TUNNELED  09/09/2022    [place] [facility] [place]    IR CVC TUNNELED  12/28/2022    IR CVC TUNN[place]20[place] [facility] [place] [facility]    MITRAL VALVE REPAIR  2013    MITRAL VALVE REPLACEMENT  09/26/2022    bioprosthetic    PARATHYROIDECTOMY      TRICUSPID VALVULOPLASTY  2013    US GUIDED PERCUTANEOUS PLACEMENT  07/14/2022    US GUIDED PERCUTANEOUS PLACEMENT [facility]   [3]   Family History  Problem Relation Name Age of Onset    No Known Problems Mother      No Known Problems Father     [4]   Social History  Tobacco Use    Smoking status: Never    Smokeless tobacco: Never   Vaping Use    Vaping status: Never Used   Substance Use Topics    Alcohol use: Never    Drug use: Never        Alecia Frank PA-C  06/08/25 2100

## 2025-06-08 NOTE — H&P
History Of Present Illness  Batsheva Page is a 49 y.o. female presenting with right groin HD line dislodged/possible line infection and cellulitis.  Patient is a 49-year-old female patient with past medical history of end-stage renal disease(dialysis Monday/Wednesday/Friday), hypertension, MRSA/MSSA bacteremia, AVR, endocarditis, seizures; who presented at [facility] with dislodged right groin HD catheter and possible site infection.  Patient report finishing dialysis Friday when noted dialysis catheter sliding out; in the following day patient noted purulent drainage around dialysis catheter entry site.  She denies any fever but reports some chills, denies any nausea or vomiting.  Patient denies chest pain or shortness of breath. Patient also reports significant erythema around catheter entry site.  ED course  Patient received pip-tazo and vancomycin  Blood cultures were sent and in progress  Patient will be admitted for possible dialysis line infection, IR consultation for line exchange.   Past Medical History  Medical History[1]    Surgical History  Surgical History[2]     Social History  She reports that she has never smoked. She has never used smokeless tobacco. She reports that she does not drink alcohol and does not use drugs.    Family History  Family History[3]     Allergies  Kayexalate, Metoclopramide hcl, Prochlorperazine, Zofran [ondansetron hcl], and Ondansetron    Review of Systems   Constitutional:  Positive for chills.   Skin:         Right groin HD line erythema and purulent drainage   All other systems reviewed and are negative.       Physical Exam  Vitals reviewed.   Constitutional:       Appearance: She is ill-appearing.   HENT:      Head: Normocephalic.      Nose: Nose normal.      Mouth/Throat:      Mouth: Mucous membranes are moist.   Eyes:      Extraocular Movements: Extraocular movements intact.   Cardiovascular:      Rate and Rhythm: Rhythm irregular.   Pulmonary:     "  Effort: Pulmonary effort is normal.   Abdominal:      General: Bowel sounds are normal.   Musculoskeletal:         General: Normal range of motion.      Cervical back: Neck supple.   Skin:     Findings: Erythema present.      Comments: Right groin, HD line site   Neurological:      Mental Status: She is alert and oriented to person, place, and time.          Last Recorded Vitals  Blood pressure (!) 170/93, pulse 84, temperature 36.7 °C (98.1 °F), temperature source Tympanic, resp. rate 18, height 1.651 m (5' 5\"), weight 69.4 kg (153 lb), SpO2 100%.    Relevant Results    No results found.      Results for orders placed or performed during the hospital encounter of 06/08/25 (from the past 24 hours)   CBC and Auto Differential   Result Value Ref Range    WBC 5.8 4.4 - 11.3 x10*3/uL    nRBC 0.0 0.0 - 0.0 /100 WBCs    RBC 3.41 (L) 4.00 - 5.20 x10*6/uL    Hemoglobin 10.6 (L) 12.0 - 16.0 g/dL    Hematocrit 34.0 (L) 36.0 - 46.0 %     80 - 100 fL    MCH 31.1 26.0 - 34.0 pg    MCHC 31.2 (L) 32.0 - 36.0 g/dL    RDW 15.9 (H) 11.5 - 14.5 %    Platelets 147 (L) 150 - 450 x10*3/uL    Neutrophils % 79.7 40.0 - 80.0 %    Immature Granulocytes %, Automated 0.3 0.0 - 0.9 %    Lymphocytes % 11.2 13.0 - 44.0 %    Monocytes % 5.4 2.0 - 10.0 %    Eosinophils % 2.9 0.0 - 6.0 %    Basophils % 0.5 0.0 - 2.0 %    Neutrophils Absolute 4.61 1.20 - 7.70 x10*3/uL    Immature Granulocytes Absolute, Automated 0.02 0.00 - 0.70 x10*3/uL    Lymphocytes Absolute 0.65 (L) 1.20 - 4.80 x10*3/uL    Monocytes Absolute 0.31 0.10 - 1.00 x10*3/uL    Eosinophils Absolute 0.17 0.00 - 0.70 x10*3/uL    Basophils Absolute 0.03 0.00 - 0.10 x10*3/uL   Comprehensive metabolic panel   Result Value Ref Range    Glucose 90 74 - 99 mg/dL    Sodium 131 (L) 136 - 145 mmol/L    Potassium 5.2 3.5 - 5.3 mmol/L    Chloride 90 (L) 98 - 107 mmol/L    Bicarbonate 24 21 - 32 mmol/L    Anion Gap 22 (H) 10 - 20 mmol/L    Urea Nitrogen 44 (H) 6 - 23 mg/dL    Creatinine 7.60 " (H) 0.50 - 1.05 mg/dL    eGFR 6 (L) >60 mL/min/1.73m*2    Calcium 7.5 (L) 8.6 - 10.3 mg/dL    Albumin 4.4 3.4 - 5.0 g/dL    Alkaline Phosphatase 45 33 - 110 U/L    Total Protein 9.2 (H) 6.4 - 8.2 g/dL    AST 16 9 - 39 U/L    Bilirubin, Total 0.5 0.0 - 1.2 mg/dL    ALT 7 7 - 45 U/L   Lactate   Result Value Ref Range    Lactate 0.9 0.4 - 2.0 mmol/L    Scheduled medications  Scheduled Medications[4]  Continuous medications  Continuous Medications[5]  PRN medications  PRN Medications[6]   Assessment & Plan  Complication associated with dialysis catheter  - per patient HD line sliding out and noted erythema and purulent drainage-> blood cultures are in progress, ordered Zosyn and vancomycin, wound culture ordered  - ID consultation placed  -IR consultation placed  - Hx of MSSA/MRSA bacteremia      #ESRD  - HD-> M/W/F last HD completed 6/6  - nephrology consult placed    #HTN  - resumed home medications    #Seizure  - resumed home medication  - seizure precaution    #CAD  - ASA and statin    #Anxiety/depression  - resumed home medications     GI prophylaxis  -PPI    DVT prophylaxis  - subcutaneous heparin     Plan  IR consult for line exchange  Follow labs and culture  Follow up with ID recommendation  Continue with zosyn and vancomycin for now    I spent 55 minutes in the professional and overall care of this patient.      Hetal Villalba, APRN-CNP         [1]   Past Medical History:  Diagnosis Date    Aortic valve replaced 2022    CHF (congestive heart failure)     Chronic pain     Coronary artery disease     Disease of thyroid gland     ESRD (end stage renal disease) (Multi)     H/O mitral valve replacement 2013    and 2022    Heart disease     History of transfusion     Hypertension     Mitral valve regurgitation     Seizures (Multi)     Stroke (Multi)    [2]   Past Surgical History:  Procedure Laterality Date    AORTIC VALVE REPLACEMENT  09/26/2022    bioprosthetic    CORONARY ARTERY BYPASS GRAFT      IR CVC  01/12/2024     exchange    IR CVC  05/07/2024    exchange    IR CVC  08/20/2024    removal    IR CVC TUNNELED  09/09/2022    IR CVC TUNN[place] [facility] [place] [facility]    IR CVC TUNNELED  12/28/2022    IR CVC TUNNELED 12/28/2022 [facility] [place] [facility]    MITRAL VALVE REPAIR  2013    MITRAL VALVE REPLACEMENT  09/26/2022    bioprosthetic    PARATHYROIDECTOMY      TRICUSPID VALVULOPLASTY  2013    US GUIDED PERCUTANEOUS PLACEMENT  07/14/2022    US GUIDED PERCUTANEOUS PLACEMENT [facility]   [3]   Family History  Problem Relation Name Age of Onset    No Known Problems Mother      No Known Problems Father     [4] [START ON 6/9/2025] aspirin, 81 mg, oral, Daily  atorvastatin, 40 mg, oral, Nightly  [START ON 6/9/2025] calcitriol, 0.5 mcg, oral, Daily  carvedilol, 12.5 mg, oral, BID  cloNIDine, 0.3 mg, oral, q8h KIMBERLY  ergocalciferol, 1.25 mg, oral, Every Sunday  levETIRAcetam, 750 mg, oral, Nightly  piperacillin-tazobactam, 2.25 g, intravenous, q12h  [START ON 6/9/2025] pregabalin, 75 mg, oral, Daily  vancomycin, 1,000 mg, intravenous, Once  [5]    [6] PRN medications: acetaminophen, diphenhydrAMINE, hydrOXYzine HCL, oxyCODONE-acetaminophen, vancomycin

## 2025-06-08 NOTE — SIGNIFICANT EVENT
06/08/25 1700   Provider Notification   Reason for Communication Abnormal vitals   Provider Name Zack/Orly   Provider Role Attending physician   Method of Communication Secure Text   Details of Communication ok so bp went down to 172/93, I waited 45 min and now its back up to 192/85 (this was 45 min post giving ivp hydralazine)   Response Waiting for response   Notification Time 0620

## 2025-06-08 NOTE — NURSING NOTE
BP elevated even after giving Dilaudid prn pain. CNP Hunker messaged, new order for hydralazine IVP ordered... awaiting pharmacy  to approve.

## 2025-06-08 NOTE — CONSULTS
Vancomycin Dosing by Pharmacy- HEMODIALYSIS, NON-FUNCTIONING PORT    Batsheva Page is a 49 y.o. year old female who Pharmacy has been consulted for vancomycin dosing for cellulitis/skin and soft tissue infection, infected dialysis port. Based on the patient's indication and renal status this patient will be dosed based on a target level of Other Indication: 15-20 mcg/mL    Patient is currently awaiting HD line exchange.    Initial Dosin mg x 1    Visit Vitals  BP (!) 170/93 (BP Location: Left arm, Patient Position: Sitting)   Pulse 84   Temp 36.7 °C (98.1 °F) (Tympanic)   Resp 18           Lab Results   Component Value Date    CREATININE 7.60 (H) 2025    CREATININE 6.92 (H) 2025    CREATININE 5.11 (H) 2025    CREATININE 6.92 (H) 2025       No intake/output data recorded.    Lab Results   Component Value Date    PATIENTTEMP 37.0 2024    PATIENTTEMP 37.0 2022    PATIENTTEMP 37.0 2022    PATIENTTEMP 37.0 2022          Assessment/Plan     Random level within 24 hours of first dose will be obtained on  at 05:00. May be obtained sooner if clinically indicated.   Will continue to monitor renal function daily while on vancomycin and order serum creatinine at least every 48 hours if not already ordered.  Follow for continued vancomycin needs, clinical response, and signs/symptoms of toxicity.     Cezar ButlerD

## 2025-06-08 NOTE — ASSESSMENT & PLAN NOTE
- per patient HD line sliding out and noted erythema and purulent drainage-> blood cultures are in progress, ordered Zosyn and vancomycin, wound culture ordered  - ID consultation placed  -IR consultation placed  - Hx of MSSA/MRSA bacteremia      #ESRD  - HD-> M/W/F last HD completed 6/6  - nephrology consult placed    #HTN  - resumed home medications    #Seizure  - resumed home medication  - seizure precaution    #CAD  - ASA and statin    #Anxiety/depression  - resumed home medications     GI prophylaxis  -PPI    DVT prophylaxis  - subcutaneous heparin

## 2025-06-08 NOTE — ED TRIAGE NOTES
Dialysis cath problem. State that may be infected, does leak . Last Dialysis was on Friday and was told to come in

## 2025-06-09 ENCOUNTER — APPOINTMENT (OUTPATIENT)
Dept: DIALYSIS | Facility: HOSPITAL | Age: 50
End: 2025-06-09
Payer: MEDICARE

## 2025-06-09 LAB
ALBUMIN SERPL BCP-MCNC: 3.7 G/DL (ref 3.4–5)
ANION GAP SERPL CALCULATED.3IONS-SCNC: 23 MMOL/L (ref 10–20)
ANION GAP SERPL CALCULATED.3IONS-SCNC: 23 MMOL/L (ref 10–20)
BUN SERPL-MCNC: 55 MG/DL (ref 6–23)
BUN SERPL-MCNC: 57 MG/DL (ref 6–23)
CALCIUM SERPL-MCNC: 6.4 MG/DL (ref 8.6–10.3)
CALCIUM SERPL-MCNC: 6.9 MG/DL (ref 8.6–10.3)
CHLORIDE SERPL-SCNC: 94 MMOL/L (ref 98–107)
CHLORIDE SERPL-SCNC: 94 MMOL/L (ref 98–107)
CO2 SERPL-SCNC: 21 MMOL/L (ref 21–32)
CO2 SERPL-SCNC: 22 MMOL/L (ref 21–32)
CREAT SERPL-MCNC: 10.02 MG/DL (ref 0.5–1.05)
CREAT SERPL-MCNC: 8.91 MG/DL (ref 0.5–1.05)
EGFRCR SERPLBLD CKD-EPI 2021: 4 ML/MIN/1.73M*2
EGFRCR SERPLBLD CKD-EPI 2021: 5 ML/MIN/1.73M*2
ERYTHROCYTE [DISTWIDTH] IN BLOOD BY AUTOMATED COUNT: 15.9 % (ref 11.5–14.5)
GLUCOSE BLD MANUAL STRIP-MCNC: 100 MG/DL (ref 74–99)
GLUCOSE BLD MANUAL STRIP-MCNC: 121 MG/DL (ref 74–99)
GLUCOSE BLD MANUAL STRIP-MCNC: 122 MG/DL (ref 74–99)
GLUCOSE SERPL-MCNC: 50 MG/DL (ref 74–99)
GLUCOSE SERPL-MCNC: 87 MG/DL (ref 74–99)
HCT VFR BLD AUTO: 28 % (ref 36–46)
HCT VFR BLD AUTO: 29.9 % (ref 36–46)
HGB BLD-MCNC: 8.8 G/DL (ref 12–16)
HGB BLD-MCNC: 9.5 G/DL (ref 12–16)
IRON SATN MFR SERPL: 24 % (ref 25–45)
IRON SERPL-MCNC: 43 UG/DL (ref 35–150)
MCH RBC QN AUTO: 31 PG (ref 26–34)
MCHC RBC AUTO-ENTMCNC: 31.4 G/DL (ref 32–36)
MCV RBC AUTO: 99 FL (ref 80–100)
NRBC BLD-RTO: 0 /100 WBCS (ref 0–0)
PHOSPHATE SERPL-MCNC: 7 MG/DL (ref 2.5–4.9)
PLATELET # BLD AUTO: 128 X10*3/UL (ref 150–450)
POTASSIUM SERPL-SCNC: 4.6 MMOL/L (ref 3.5–5.3)
POTASSIUM SERPL-SCNC: 5.9 MMOL/L (ref 3.5–5.3)
RBC # BLD AUTO: 2.84 X10*6/UL (ref 4–5.2)
SODIUM SERPL-SCNC: 132 MMOL/L (ref 136–145)
SODIUM SERPL-SCNC: 134 MMOL/L (ref 136–145)
TIBC SERPL-MCNC: 178 UG/DL (ref 240–445)
UIBC SERPL-MCNC: 135 UG/DL (ref 110–370)
VANCOMYCIN SERPL-MCNC: 20.8 UG/ML (ref 5–20)
WBC # BLD AUTO: 5.1 X10*3/UL (ref 4.4–11.3)

## 2025-06-09 PROCEDURE — 1200000002 HC GENERAL ROOM WITH TELEMETRY DAILY

## 2025-06-09 PROCEDURE — 2500000005 HC RX 250 GENERAL PHARMACY W/O HCPCS: Performed by: INTERNAL MEDICINE

## 2025-06-09 PROCEDURE — 80202 ASSAY OF VANCOMYCIN: CPT | Performed by: NURSE PRACTITIONER

## 2025-06-09 PROCEDURE — 6350000001 HC RX 635 EPOETIN >10,000 UNITS: Performed by: INTERNAL MEDICINE

## 2025-06-09 PROCEDURE — 2500000002 HC RX 250 W HCPCS SELF ADMINISTERED DRUGS (ALT 637 FOR MEDICARE OP, ALT 636 FOR OP/ED): Performed by: INTERNAL MEDICINE

## 2025-06-09 PROCEDURE — 2500000001 HC RX 250 WO HCPCS SELF ADMINISTERED DRUGS (ALT 637 FOR MEDICARE OP): Performed by: NURSE PRACTITIONER

## 2025-06-09 PROCEDURE — 87186 SC STD MICRODIL/AGAR DIL: CPT | Mod: WESLAB | Performed by: NURSE PRACTITIONER

## 2025-06-09 PROCEDURE — 36415 COLL VENOUS BLD VENIPUNCTURE: CPT | Performed by: INTERNAL MEDICINE

## 2025-06-09 PROCEDURE — 82947 ASSAY GLUCOSE BLOOD QUANT: CPT

## 2025-06-09 PROCEDURE — 85027 COMPLETE CBC AUTOMATED: CPT | Performed by: NURSE PRACTITIONER

## 2025-06-09 PROCEDURE — 36415 COLL VENOUS BLD VENIPUNCTURE: CPT | Performed by: NURSE PRACTITIONER

## 2025-06-09 PROCEDURE — 83550 IRON BINDING TEST: CPT | Performed by: NURSE PRACTITIONER

## 2025-06-09 PROCEDURE — 99233 SBSQ HOSP IP/OBS HIGH 50: CPT | Performed by: NURSE PRACTITIONER

## 2025-06-09 PROCEDURE — 80048 BASIC METABOLIC PNL TOTAL CA: CPT | Mod: CCI | Performed by: INTERNAL MEDICINE

## 2025-06-09 PROCEDURE — 80069 RENAL FUNCTION PANEL: CPT | Performed by: NURSE PRACTITIONER

## 2025-06-09 PROCEDURE — 2500000004 HC RX 250 GENERAL PHARMACY W/ HCPCS (ALT 636 FOR OP/ED): Performed by: NURSE PRACTITIONER

## 2025-06-09 PROCEDURE — 87081 CULTURE SCREEN ONLY: CPT | Mod: WESLAB | Performed by: NURSE PRACTITIONER

## 2025-06-09 PROCEDURE — 99222 1ST HOSP IP/OBS MODERATE 55: CPT | Performed by: NURSE PRACTITIONER

## 2025-06-09 PROCEDURE — 2500000004 HC RX 250 GENERAL PHARMACY W/ HCPCS (ALT 636 FOR OP/ED): Performed by: INTERNAL MEDICINE

## 2025-06-09 PROCEDURE — 2500000004 HC RX 250 GENERAL PHARMACY W/ HCPCS (ALT 636 FOR OP/ED): Performed by: REGISTERED NURSE

## 2025-06-09 PROCEDURE — 85014 HEMATOCRIT: CPT | Performed by: NURSE PRACTITIONER

## 2025-06-09 RX ORDER — HEPARIN 100 UNIT/ML
5 SYRINGE INTRAVENOUS ONCE
Status: DISCONTINUED | OUTPATIENT
Start: 2025-06-09 | End: 2025-06-19 | Stop reason: HOSPADM

## 2025-06-09 RX ORDER — DIPHENHYDRAMINE HYDROCHLORIDE 50 MG/ML
25 INJECTION, SOLUTION INTRAMUSCULAR; INTRAVENOUS EVERY 6 HOURS PRN
Status: DISCONTINUED | OUTPATIENT
Start: 2025-06-09 | End: 2025-06-09

## 2025-06-09 RX ORDER — DEXTROSE 50 % IN WATER (D50W) INTRAVENOUS SYRINGE
12.5
Status: DISCONTINUED | OUTPATIENT
Start: 2025-06-09 | End: 2025-06-19 | Stop reason: HOSPADM

## 2025-06-09 RX ORDER — DIPHENHYDRAMINE HYDROCHLORIDE 50 MG/ML
50 INJECTION, SOLUTION INTRAMUSCULAR; INTRAVENOUS
Status: DISCONTINUED | OUTPATIENT
Start: 2025-06-09 | End: 2025-06-13

## 2025-06-09 RX ORDER — DEXTROSE 50 % IN WATER (D50W) INTRAVENOUS SYRINGE
25 ONCE
Status: COMPLETED | OUTPATIENT
Start: 2025-06-09 | End: 2025-06-09

## 2025-06-09 RX ORDER — DEXTROSE 50 % IN WATER (D50W) INTRAVENOUS SYRINGE
25
Status: DISCONTINUED | OUTPATIENT
Start: 2025-06-09 | End: 2025-06-19 | Stop reason: HOSPADM

## 2025-06-09 RX ADMIN — HYDROMORPHONE HYDROCHLORIDE 0.4 MG: 1 INJECTION, SOLUTION INTRAMUSCULAR; INTRAVENOUS; SUBCUTANEOUS at 13:42

## 2025-06-09 RX ADMIN — CARVEDILOL 12.5 MG: 12.5 TABLET, FILM COATED ORAL at 22:28

## 2025-06-09 RX ADMIN — DIPHENHYDRAMINE HYDROCHLORIDE 25 MG: 50 INJECTION, SOLUTION INTRAMUSCULAR; INTRAVENOUS at 00:04

## 2025-06-09 RX ADMIN — HYDROMORPHONE HYDROCHLORIDE 0.4 MG: 1 INJECTION, SOLUTION INTRAMUSCULAR; INTRAVENOUS; SUBCUTANEOUS at 22:25

## 2025-06-09 RX ADMIN — CLONIDINE HYDROCHLORIDE 0.3 MG: 0.2 TABLET ORAL at 13:42

## 2025-06-09 RX ADMIN — HYDROMORPHONE HYDROCHLORIDE 0.2 MG: 0.5 INJECTION, SOLUTION INTRAMUSCULAR; INTRAVENOUS; SUBCUTANEOUS at 00:04

## 2025-06-09 RX ADMIN — INSULIN HUMAN 10 UNITS: 100 INJECTION, SOLUTION PARENTERAL at 09:42

## 2025-06-09 RX ADMIN — HYDROMORPHONE HYDROCHLORIDE 0.2 MG: 0.5 INJECTION, SOLUTION INTRAMUSCULAR; INTRAVENOUS; SUBCUTANEOUS at 02:56

## 2025-06-09 RX ADMIN — ASPIRIN 81 MG: 81 TABLET, COATED ORAL at 08:40

## 2025-06-09 RX ADMIN — DIPHENHYDRAMINE HYDROCHLORIDE 50 MG: 50 INJECTION, SOLUTION INTRAMUSCULAR; INTRAVENOUS at 11:45

## 2025-06-09 RX ADMIN — HYDROMORPHONE HYDROCHLORIDE 0.2 MG: 0.5 INJECTION, SOLUTION INTRAMUSCULAR; INTRAVENOUS; SUBCUTANEOUS at 05:28

## 2025-06-09 RX ADMIN — DIPHENHYDRAMINE HYDROCHLORIDE 50 MG: 50 INJECTION, SOLUTION INTRAMUSCULAR; INTRAVENOUS at 22:26

## 2025-06-09 RX ADMIN — EPOETIN ALFA-EPBX 6000 UNITS: 20000 INJECTION, SOLUTION INTRAVENOUS; SUBCUTANEOUS at 17:25

## 2025-06-09 RX ADMIN — HYDROMORPHONE HYDROCHLORIDE 0.4 MG: 1 INJECTION, SOLUTION INTRAMUSCULAR; INTRAVENOUS; SUBCUTANEOUS at 17:41

## 2025-06-09 RX ADMIN — DIPHENHYDRAMINE HYDROCHLORIDE 25 MG: 50 INJECTION, SOLUTION INTRAMUSCULAR; INTRAVENOUS at 05:27

## 2025-06-09 RX ADMIN — ATORVASTATIN CALCIUM 40 MG: 40 TABLET, FILM COATED ORAL at 22:27

## 2025-06-09 RX ADMIN — DIPHENHYDRAMINE HYDROCHLORIDE 50 MG: 50 INJECTION, SOLUTION INTRAMUSCULAR; INTRAVENOUS at 19:03

## 2025-06-09 RX ADMIN — CARVEDILOL 12.5 MG: 12.5 TABLET, FILM COATED ORAL at 08:40

## 2025-06-09 RX ADMIN — PIPERACILLIN SODIUM AND TAZOBACTAM SODIUM 2.25 G: 2; .25 INJECTION, SOLUTION INTRAVENOUS at 13:42

## 2025-06-09 RX ADMIN — CLONIDINE HYDROCHLORIDE 0.3 MG: 0.2 TABLET ORAL at 05:42

## 2025-06-09 RX ADMIN — CLONIDINE HYDROCHLORIDE 0.3 MG: 0.2 TABLET ORAL at 22:27

## 2025-06-09 RX ADMIN — PIPERACILLIN SODIUM AND TAZOBACTAM SODIUM 2.25 G: 2; .25 INJECTION, SOLUTION INTRAVENOUS at 05:30

## 2025-06-09 RX ADMIN — CALCITRIOL CAPSULES 0.25 MCG 0.5 MCG: 0.25 CAPSULE ORAL at 08:40

## 2025-06-09 RX ADMIN — DEXTROSE MONOHYDRATE 25 G: 25 INJECTION, SOLUTION INTRAVENOUS at 09:39

## 2025-06-09 RX ADMIN — HYDROMORPHONE HYDROCHLORIDE 0.4 MG: 1 INJECTION, SOLUTION INTRAMUSCULAR; INTRAVENOUS; SUBCUTANEOUS at 09:39

## 2025-06-09 RX ADMIN — PIPERACILLIN SODIUM AND TAZOBACTAM SODIUM 2.25 G: 2; .25 INJECTION, SOLUTION INTRAVENOUS at 22:37

## 2025-06-09 SDOH — SOCIAL STABILITY: SOCIAL NETWORK: HOW OFTEN DO YOU ATTEND MEETINGS OF THE CLUBS OR ORGANIZATIONS YOU BELONG TO?: NEVER

## 2025-06-09 SDOH — HEALTH STABILITY: PHYSICAL HEALTH: ON AVERAGE, HOW MANY MINUTES DO YOU ENGAGE IN EXERCISE AT THIS LEVEL?: 0 MIN

## 2025-06-09 SDOH — ECONOMIC STABILITY: FOOD INSECURITY: WITHIN THE PAST 12 MONTHS, THE FOOD YOU BOUGHT JUST DIDN'T LAST AND YOU DIDN'T HAVE MONEY TO GET MORE.: NEVER TRUE

## 2025-06-09 SDOH — ECONOMIC STABILITY: FOOD INSECURITY: WITHIN THE PAST 12 MONTHS, YOU WORRIED THAT YOUR FOOD WOULD RUN OUT BEFORE YOU GOT THE MONEY TO BUY MORE.: NEVER TRUE

## 2025-06-09 SDOH — ECONOMIC STABILITY: FOOD INSECURITY: HOW HARD IS IT FOR YOU TO PAY FOR THE VERY BASICS LIKE FOOD, HOUSING, MEDICAL CARE, AND HEATING?: NOT HARD AT ALL

## 2025-06-09 SDOH — HEALTH STABILITY: MENTAL HEALTH: HOW OFTEN DO YOU HAVE A DRINK CONTAINING ALCOHOL?: NEVER

## 2025-06-09 SDOH — ECONOMIC STABILITY: HOUSING INSECURITY: IN THE PAST 12 MONTHS, HOW MANY TIMES HAVE YOU MOVED WHERE YOU WERE LIVING?: 0

## 2025-06-09 SDOH — ECONOMIC STABILITY: INCOME INSECURITY: IN THE PAST 12 MONTHS HAS THE ELECTRIC, GAS, OIL, OR WATER COMPANY THREATENED TO SHUT OFF SERVICES IN YOUR HOME?: NO

## 2025-06-09 SDOH — SOCIAL STABILITY: SOCIAL INSECURITY: ARE YOU MARRIED, WIDOWED, DIVORCED, SEPARATED, NEVER MARRIED, OR LIVING WITH A PARTNER?: LIVING WITH PARTNER

## 2025-06-09 SDOH — SOCIAL STABILITY: SOCIAL INSECURITY: WITHIN THE LAST YEAR, HAVE YOU BEEN AFRAID OF YOUR PARTNER OR EX-PARTNER?: NO

## 2025-06-09 SDOH — SOCIAL STABILITY: SOCIAL NETWORK: HOW OFTEN DO YOU GET TOGETHER WITH FRIENDS OR RELATIVES?: MORE THAN THREE TIMES A WEEK

## 2025-06-09 SDOH — HEALTH STABILITY: MENTAL HEALTH: HOW MANY DRINKS CONTAINING ALCOHOL DO YOU HAVE ON A TYPICAL DAY WHEN YOU ARE DRINKING?: PATIENT DOES NOT DRINK

## 2025-06-09 SDOH — HEALTH STABILITY: MENTAL HEALTH: HOW OFTEN DO YOU HAVE SIX OR MORE DRINKS ON ONE OCCASION?: NEVER

## 2025-06-09 SDOH — SOCIAL STABILITY: SOCIAL INSECURITY: WITHIN THE LAST YEAR, HAVE YOU BEEN HUMILIATED OR EMOTIONALLY ABUSED IN OTHER WAYS BY YOUR PARTNER OR EX-PARTNER?: NO

## 2025-06-09 SDOH — ECONOMIC STABILITY: HOUSING INSECURITY: AT ANY TIME IN THE PAST 12 MONTHS, WERE YOU HOMELESS OR LIVING IN A SHELTER (INCLUDING NOW)?: NO

## 2025-06-09 SDOH — ECONOMIC STABILITY: HOUSING INSECURITY: IN THE LAST 12 MONTHS, WAS THERE A TIME WHEN YOU WERE NOT ABLE TO PAY THE MORTGAGE OR RENT ON TIME?: NO

## 2025-06-09 SDOH — HEALTH STABILITY: PHYSICAL HEALTH: ON AVERAGE, HOW MANY DAYS PER WEEK DO YOU ENGAGE IN MODERATE TO STRENUOUS EXERCISE (LIKE A BRISK WALK)?: 0 DAYS

## 2025-06-09 SDOH — SOCIAL STABILITY: SOCIAL NETWORK: HOW OFTEN DO YOU ATTEND CHURCH OR RELIGIOUS SERVICES?: MORE THAN 4 TIMES PER YEAR

## 2025-06-09 SDOH — ECONOMIC STABILITY: TRANSPORTATION INSECURITY: IN THE PAST 12 MONTHS, HAS LACK OF TRANSPORTATION KEPT YOU FROM MEDICAL APPOINTMENTS OR FROM GETTING MEDICATIONS?: NO

## 2025-06-09 ASSESSMENT — PAIN SCALES - GENERAL
PAINLEVEL_OUTOF10: 10 - WORST POSSIBLE PAIN
PAINLEVEL_OUTOF10: 10 - WORST POSSIBLE PAIN
PAINLEVEL_OUTOF10: 7
PAINLEVEL_OUTOF10: 0 - NO PAIN
PAINLEVEL_OUTOF10: 4
PAINLEVEL_OUTOF10: 0 - NO PAIN
PAINLEVEL_OUTOF10: 4
PAINLEVEL_OUTOF10: 0 - NO PAIN
PAINLEVEL_OUTOF10: 0 - NO PAIN
PAINLEVEL_OUTOF10: 10 - WORST POSSIBLE PAIN
PAINLEVEL_OUTOF10: 9
PAINLEVEL_OUTOF10: 0 - NO PAIN
PAINLEVEL_OUTOF10: 4
PAINLEVEL_OUTOF10: 10 - WORST POSSIBLE PAIN

## 2025-06-09 ASSESSMENT — COGNITIVE AND FUNCTIONAL STATUS - GENERAL
MOBILITY SCORE: 24
DAILY ACTIVITIY SCORE: 24
MOBILITY SCORE: 24
DAILY ACTIVITIY SCORE: 24

## 2025-06-09 ASSESSMENT — PAIN - FUNCTIONAL ASSESSMENT
PAIN_FUNCTIONAL_ASSESSMENT: 0-10
PAIN_FUNCTIONAL_ASSESSMENT: WONG-BAKER FACES
PAIN_FUNCTIONAL_ASSESSMENT: 0-10
PAIN_FUNCTIONAL_ASSESSMENT: 0-10

## 2025-06-09 ASSESSMENT — PAIN DESCRIPTION - ORIENTATION
ORIENTATION: RIGHT

## 2025-06-09 ASSESSMENT — PAIN DESCRIPTION - DESCRIPTORS
DESCRIPTORS: SHARP
DESCRIPTORS: SHARP
DESCRIPTORS: DISCOMFORT
DESCRIPTORS: SHARP

## 2025-06-09 ASSESSMENT — PAIN DESCRIPTION - LOCATION
LOCATION: GROIN
LOCATION: GENERALIZED

## 2025-06-09 ASSESSMENT — LIFESTYLE VARIABLES
AUDIT-C TOTAL SCORE: 0
SKIP TO QUESTIONS 9-10: 1

## 2025-06-09 ASSESSMENT — ACTIVITIES OF DAILY LIVING (ADL)
EFFECT OF PAIN ON DAILY ACTIVITIES: UNABLE TO REST
LACK_OF_TRANSPORTATION: NO
LACK_OF_TRANSPORTATION: NO

## 2025-06-09 ASSESSMENT — PAIN SCALES - WONG BAKER
WONGBAKER_NUMERICALRESPONSE: NO HURT
WONGBAKER_NUMERICALRESPONSE: HURTS WORST

## 2025-06-09 NOTE — PROGRESS NOTES
Batsheva Page is a 49 y.o. female on day 1 of admission presenting with Complication associated with dialysis catheter.      Subjective   Pt was seen and examined at bedside. She is alert and oriented. She reports a 10/10 pain at the catheter site. She experience numbness of her legs which is not new for her. Her last BM was Sunday, states she as not been eating much. She is NPO now. Overall, she seems well without acute distress. She is anxious regarding the catheter exchange. She denies subjective fever, chills, HA, dizziness, lightheaded, vision change, flu sx, SOB, CP, palpitation, abdominal pain, NV, diarrhea, and constipation. Her dialysis planned for later today.       Objective     Last Recorded Vitals  /70 (BP Location: Left arm, Patient Position: Sitting)   Pulse 63   Temp 36.5 °C (97.7 °F) (Oral)   Resp 18   Wt 69.4 kg (153 lb)   SpO2 99%   Intake/Output last 3 Shifts:    Intake/Output Summary (Last 24 hours) at 6/9/2025 0922  Last data filed at 6/9/2025 0600  Gross per 24 hour   Intake 300 ml   Output 0 ml   Net 300 ml       Admission Weight  Weight: 69.4 kg (153 lb) (06/08/25 1152)    Daily Weight  06/08/25 : 69.4 kg (153 lb)    Image Results  IR CVC tunneled  Narrative: Interpreted By:  Chris Lott,   STUDY:  IR CVC TUNNELED; 4/24/2025 4:50 pm      INDICATION:  Malfunctioning tunneled hemodialysis catheter right groin.      COMPARISON:  None      ACCESSION NUMBER(S):  [id number]      ORDERING CLINICIAN:  PAT BRYAN      TECHNIQUE:  Exchange of tunneled central venous catheter without port.  Fluoroscopy time 0.4 minutes; dose 2.76 mGy air kerma.      FINDINGS:  Informed consent obtained. Patient positioned supine and connected to  physiologic monitoring. Conscious sedation provided Versed and  fentanyl. All elements of maximal sterile barrier were utilized  including cap, mask, sterile gown, sterile gloves, large sterile  drape, hand scrub, 2% chlorhexidine for cleaning the  right chest wall  and indwelling catheter. Skin and subcutaneous tract around insertion  site anesthetized with lidocaine. Using blunt dissection as needed,  the indwelling catheter was removed over a guidewire. A new 14.5  French x 44 cm double-lumen cuffed catheter (Palindrome) was advanced  over the wire with tip positioned near inferior cavoatrial junction.  Both lumens aspirated and flushed freely, locked with heparin.  Catheter secured and covered with dressing. Patient tolerated  procedure well.      Impression: Exchange of tunneled central venous hemodialysis catheter entering at  right common femoral vein. The catheter is ready for use.          Signed by: Chris Lott 4/28/2025 10:17 AM  Dictation workstation:   [id number]      Physical Exam  Vitals and nursing note reviewed.   Constitutional:       General: She is not in acute distress.     Appearance: Normal appearance. She is not toxic-appearing.   HENT:      Head: Normocephalic and atraumatic.      Nose: Nose normal.      Mouth/Throat:      Mouth: Mucous membranes are moist.      Pharynx: Oropharynx is clear.   Eyes:      General: Lids are normal.      Conjunctiva/sclera: Conjunctivae normal.      Pupils: Pupils are equal, round, and reactive to light.   Cardiovascular:      Rate and Rhythm: Normal rate and regular rhythm.      Comments: Loud    Pulmonary:      Effort: Pulmonary effort is normal. No respiratory distress.      Breath sounds: Normal breath sounds. No stridor. No wheezing or rhonchi.   Abdominal:      General: Abdomen is flat. There is no distension.      Palpations: Abdomen is soft.      Tenderness: There is no abdominal tenderness.   Musculoskeletal:      Cervical back: Normal range of motion and neck supple.   Skin:     General: Skin is warm and dry.      Capillary Refill: Capillary refill takes 2 to 3 seconds.      Findings: Erythema (right femoral dialysis catheter with mild erythema and minimal drainage) present.      Comments:  Erosions/wound throughout the BLE, pt states its from the chronic pruritus    Neurological:      General: No focal deficit present.      Mental Status: She is alert and oriented to person, place, and time.      Cranial Nerves: No cranial nerve deficit.      Motor: No weakness.   Psychiatric:         Mood and Affect: Mood normal.         Behavior: Behavior normal.         Thought Content: Thought content normal.       Relevant Results  Lab Results   Component Value Date    GLUCOSE 88 06/10/2025    CALCIUM 6.4 (L) 06/10/2025     (L) 06/10/2025    K 5.6 (H) 06/10/2025    CO2 17 (L) 06/10/2025    CL 94 (L) 06/10/2025    BUN 65 (H) 06/10/2025    CREATININE 10.32 (H) 06/10/2025      Lab Results   Component Value Date    WBC 4.8 06/10/2025    HGB 8.8 (L) 06/10/2025    HCT 28.5 (L) 06/10/2025     (H) 06/10/2025     (L) 06/10/2025        CBC, CMP, Vital signs, wound culture reviewed personally by me.     Assessment and Plan    Infected hemodialysis catheter   per patient HD line sliding noted erythema and purulent drainage  -blood cultures are in progress  - Stop Zosyn per ID  -Continue vancomycin pending CYNTHIA per ID  - wound culture shows +4 staphylococcus aures  - ID consultation placed  - IR consultation placed  - Hx of MSSA/MRSA bacteremia       ESRD  hyperkalemia  - HD-> M/W/F last HD completed 6/6  - nephrology consult placed  - Hyperkalemia likely corrected with dialysis, given sodium bicarb     HTN  - BP highest of 164/93 AM, last reading is 121/74  - monitor blood pressure close     Anemia  -Likely secondary to CKD vs ABNER  -Check iron panel-reviewed, occult stool  -Hemoglobin dropped from 9.5 to 8.8, repeat in AM  -PPI     Seizure  - resumed home medication  - seizure precaution     CAD  - ASA and statin  - no chest pain currently     Anxiety/depression  - resumed home medications      GI prophylaxis  -PPI     DVT prophylaxis  - subcutaneous heparin          Plan  Admit to tele  Monitor  fluid/electrolytes close  IR consult for HD line exchange today  Dialysis per nephrology once access is obtained  Continue ATB, cultures showed +4 Staph aureus, ID on consult  DVT prophylaxis  CBC and BMP in AM     ROSALIND WILLIAMSON NP Attestation: I was present with the PA student who participated in the documentation of this note. I have personally seen and re-examined the patient and performed the medical decision-making components (assessment and plan of care). I have reviewed the PA student documentation and verified the findings in the note as written with additions or exceptions as stated in the body of this note.

## 2025-06-09 NOTE — PROGRESS NOTES
Batsheva Page is a 49 y.o. female on day 1 of admission presenting with Complication associated with dialysis catheter.      Subjective   Patient seen and examined. Awake/alert/oriented. Resting in bed. Reports severe pain to the groin area catheter site and severe itching. Denies chest pain, shortness of breath, nausea, or vomiting. States she did have some chest tightness yesterday when her blood pressure was high but that this has since resolved. Discussed pain/itching regimen that has worked for her in the past and adjustments have been made.        Objective     Last Recorded Vitals  /70 (BP Location: Left arm, Patient Position: Sitting)   Pulse 63   Temp 36.5 °C (97.7 °F) (Oral)   Resp 18   Wt 69.4 kg (153 lb)   SpO2 99%   Intake/Output last 3 Shifts:    Intake/Output Summary (Last 24 hours) at 6/9/2025 0943  Last data filed at 6/9/2025 0600  Gross per 24 hour   Intake 300 ml   Output 0 ml   Net 300 ml       Admission Weight  Weight: 69.4 kg (153 lb) (06/08/25 1152)    Daily Weight  06/08/25 : 69.4 kg (153 lb)    Image Results  IR CVC tunneled  Narrative: Interpreted By:  Chris Lott,   STUDY:  IR CVC TUNNELED; 4/24/2025 4:50 pm      INDICATION:  Malfunctioning tunneled hemodialysis catheter right groin.      COMPARISON:  None      ACCESSION NUMBER(S):  [id number]      ORDERING CLINICIAN:  PAT BRYAN      TECHNIQUE:  Exchange of tunneled central venous catheter without port.  Fluoroscopy time 0.4 minutes; dose 2.76 mGy air kerma.      FINDINGS:  Informed consent obtained. Patient positioned supine and connected to  physiologic monitoring. Conscious sedation provided Versed and  fentanyl. All elements of maximal sterile barrier were utilized  including cap, mask, sterile gown, sterile gloves, large sterile  drape, hand scrub, 2% chlorhexidine for cleaning the right chest wall  and indwelling catheter. Skin and subcutaneous tract around insertion  site anesthetized with lidocaine.  Using blunt dissection as needed,  the indwelling catheter was removed over a guidewire. A new 14.5  French x 44 cm double-lumen cuffed catheter (Palindrome) was advanced  over the wire with tip positioned near inferior cavoatrial junction.  Both lumens aspirated and flushed freely, locked with heparin.  Catheter secured and covered with dressing. Patient tolerated  procedure well.      Impression: Exchange of tunneled central venous hemodialysis catheter entering at  right common femoral vein. The catheter is ready for use.          Signed by: Chris Lott 4/28/2025 10:17 AM  Dictation workstation:   [id number]      Physical Exam  Vitals reviewed.   Constitutional:       Appearance: Normal appearance.   HENT:      Head: Normocephalic and atraumatic.   Eyes:      Extraocular Movements: Extraocular movements intact.      Conjunctiva/sclera: Conjunctivae normal.   Cardiovascular:      Rate and Rhythm: Normal rate and regular rhythm.   Pulmonary:      Effort: Pulmonary effort is normal.      Breath sounds: Normal breath sounds. No wheezing, rhonchi or rales.   Abdominal:      General: Bowel sounds are normal.      Palpations: Abdomen is soft.      Tenderness: There is no abdominal tenderness.   Genitourinary:     Comments: Right groin HD site with erythema  Musculoskeletal:         General: Normal range of motion.   Skin:     General: Skin is warm and dry.   Neurological:      General: No focal deficit present.      Mental Status: She is alert and oriented to person, place, and time.         Relevant Results  Lab Results   Component Value Date    GLUCOSE 87 06/09/2025    CALCIUM 6.4 (L) 06/09/2025     (L) 06/09/2025    K 5.9 (H) 06/09/2025    CO2 21 06/09/2025    CL 94 (L) 06/09/2025    BUN 55 (H) 06/09/2025    CREATININE 8.91 (H) 06/09/2025      Lab Results   Component Value Date    WBC 5.1 06/09/2025    HGB 8.8 (L) 06/09/2025    HCT 28.0 (L) 06/09/2025    MCV 99 06/09/2025     (L) 06/09/2025                  This patient has a central line   Reason for the central line remaining today? Dialysis/Hemapheresis        CBC and BMP results reviewed. Discussed elevated potassium results with Dr. Pedersen, nephrology, and orders have been placed for IV insulin. Patient refused lokelma.    Assessment & Plan  Complication associated with dialysis catheter  - per patient HD line sliding out and noted erythema and purulent drainage  -blood cultures are in progress  - continue Zosyn and vancomycin  - wound culture ordered  - ID consultation placed  - IR consultation placed  - Hx of MSSA/MRSA bacteremia    - pain/itching management: Dilaudid 0.4mg IV Q 3 hours with Benadryl 50mg IV Q 3 hours, regimen has worked for her on previous admissions    ESRD  - HD-> M/W/F last HD completed 6/6  - nephrology consult placed    Hyperkalemia  -potassium 5.9  -refused lokelma, states cannot tolerate. Give IV insulin and D50 per nephrology  -repeat this afternoon    HTN  - Systolic in the 190s on admission with chest tightness-symptoms resolved once back on home regimen  - resumed home medications  - monitor blood pressure close    Anemia  -Likely secondary to CKD vs ABNER  -Check iron panel, occult stool  -Hemoglobin dropped from 10.6 to 8.8, repeat -H/H this afternoon  -PPI    Seizure  - resumed home medication  - seizure precaution    CAD  - ASA and statin  - no chest pain currently    Anxiety/depression  - resumed home medications     GI prophylaxis  -PPI    DVT prophylaxis  - subcutaneous heparin     Plan  Admit to tele  Monitor fluid/electrolytes close  Pain/itching regimen adjusted  IR consult for HD line exchange  Dialysis per nephrology once access is obtained  Continue ATB, follow cultures, ID on consult  DVT prophylaxis  Repeat potassium and H/H this afternoon  CBC and BMP in AM             Noelle Doss, APRN-CNP

## 2025-06-09 NOTE — CONSULTS
"Inpatient consult to Acute Care Surgery  Consult performed by: Adela Downing, APRN-CNP  Consult ordered by: Vu Pedersen MD      Reason For Consult  Mahurkar     [address]     History Of Present Illness  Batsheva Page is a 49 y.o. female on day 1 of admission presenting with Complication associated with dialysis catheter.    Per the H&P the patient presented with right groin hemodialysis line dislodged/possible line infection and cellulitis and was admitted with complication associated with dialysis catheter, ESRD, hypertension, seizure, CAD, anxiety/depression.  We are asked see the patient as above.    Patient states that the current dialysis catheter that she has in her right groin was just placed this past April by IR.  Denies having any other access.  Says that they have tried her IJ's and her subclavian's in the past and are not able to get lines there due to those areas are \"all blocked\" and the only current available area that she has for access is her left groin.  States that she has had multiple mahurkar's in the past.    Her current potassium level today was 5.9 and it is currently ordered to be rechecked because it says mild hemolysis detected and the result may be falsely elevated due to hemolysis or other interference.  Clinical correlation is recommended.  Repeat testing may be considered.    Denies taking any anticoagulants or prescription antiplatelet medications prior to admission.  Please see her below past medical and past surgical history.        Past Medical History  Past Medical History:   Diagnosis Date    Aortic valve replaced 2022    CHF (congestive heart failure)     Chronic pain     Coronary artery disease     Disease of thyroid gland     ESRD (end stage renal disease) (Multi)     H/O mitral valve replacement 2013    and 2022    Heart disease     History of transfusion     Hypertension     Mitral valve regurgitation     Seizures (Multi)     Stroke (Multi)  "       Surgical History  Past Surgical History:  Procedure Laterality Date    AORTIC VALVE REPLACEMENT  09/26/2022    bioprosthetic    CORONARY ARTERY BYPASS GRAFT      IR CVC  01/12/2024    exchange    IR CVC  05/07/2024    exchange    IR CVC  08/20/2024    removal    IR CVC TUNNELED  09/09/2022    IR CVC TUNNELED 9/9/2022 [facility] [place]    IR CVC TUNNELED  12/28/2022    IR CVC TUNNELED 12/28/2022 [facility] [place] [facility]    MITRAL VALVE REPAIR  2013    MITRAL VALVE REPLACEMENT  09/26/2022    bioprosthetic    PARATHYROIDECTOMY      TRICUSPID VALVULOPLASTY  2013    US GUIDED PERCUTANEOUS PLACEMENT  07/14/2022    US GUIDED PERCUTANEOUS PLACEMENT [facility]         Social History  Comes from home with her friend  Not working  Never smoker  No alcohol or drugs     Family History  Noncontributory     Allergies  RX Allergies[1]     Review of Systems  1) Anesthetic complications: No known personal or family history of anesthetic complications  2) General: No significant unintentional weight loss or gain, fevers, chills, weakness, fatigue, appetite loss.  Complication associated with dialysis catheter as per HPI.  3) HEENT: Negative.  4) Cardiac: No angina or leg edema.  5) Pulmonary: No asthma, wheezing, sob.  6) GI: No abd pain, n/v, appetite loss, changes in bowel habits, etc.  7) Hematologic: No known personal or family history of bleeding diathesis.  8) Endocrine: No diabetes.  9) : Patient states that she is anuric.  10) Musculoskeletal: No acute muscle/bone/joint pain/stiffness/swelling.  11) Neuro: History of seizures.  12) Psych: Anxiety and depression is listed in H&P but patient denies anxiety and depression.        Physical Exam  1) VS-noted as documented.  2) General-laying in bed in no acute distress. Not septic appearing. Pleasant and cooperative. Looks fine and comfortable.   3) Neuro-Awake, alert, oriented to person, place, and time. Speech is normal. Affect is normal. Follows commands  appropriately.  4) HEENT-Head normocephalic and externally atraumatic. Conjunctiva pink. Sclera anicteric. MMM.  5) Heart-RRR.   6) Lungs-Clear. Speaks in complete sentences and does not have any accessory muscle use.  7) Extremities-Bilateral lower extremity edema with the right being more so than the left. The patient's extremities are warm and normal color.  Has multiple scabbed areas that she states is from scratching. Current dialysis catheter to her right upper thigh/groin area does have some mild surrounding erythema.  8) Skin-Warm and dry. No diaphoresis or jaundice.  9) Psych-Normal mood. Appropriate affect.   10) Abdomen-Soft. Positive bowel sounds. Non distended.  Nondistended.  Nontender.    Patient's aunt at bedside    Vital signs in last 24 hours:  Temp:  [36.3 °C (97.3 °F)-36.5 °C (97.7 °F)] 36.5 °C (97.7 °F)  Heart Rate:  [61-70] 63  Resp:  [16-18] 18  BP: (137-192)/(70-93) 138/70  Heart Rate:  [61-70]   Temp:  [36.3 °C (97.3 °F)-36.5 °C (97.7 °F)]   Resp:  [16-18]   BP: (137-192)/(70-93)   SpO2:  [99 %-100 %]      Intake/Output last 3 Shifts:  I/O last 3 completed shifts:  In: 300 (4.3 mL/kg) [P.O.:200; IV Piggyback:100]  Out: 0 (0 mL/kg)   Weight: 69.4 kg     Scheduled medications  Scheduled Medications[2]  Continuous medications  Continuous Medications[3]  PRN medications  PRN Medications[4]    Relevant Results  Results from last 7 days   Lab Units 06/09/25  1127 06/09/25  0540 06/08/25  1246   WBC AUTO x10*3/uL  --  5.1 5.8   HEMOGLOBIN g/dL 9.5* 8.8* 10.6*   HEMATOCRIT % 29.9* 28.0* 34.0*   PLATELETS AUTO x10*3/uL  --  128* 147*      Results from last 7 days   Lab Units 06/09/25  1127 06/09/25  0540 06/08/25  1246   SODIUM mmol/L 134* 132* 131*   POTASSIUM mmol/L 4.6 5.9* 5.2   CHLORIDE mmol/L 94* 94* 90*   CO2 mmol/L 22 21 24   BUN mg/dL 57* 55* 44*   CREATININE mg/dL 10.02* 8.91* 7.60*   GLUCOSE mg/dL 50* 87 90   CALCIUM mg/dL 6.9* 6.4* 7.5*       No results found for the last 7  "days.    Imaging  No results found.    Cardiology, Vascular, and Other Imaging  No other imaging results found for the past 7 days      Assessment/Plan   Assessment & Plan  Complication associated with dialysis catheter    End-stage renal disease on hemodialysis (Multi)    Hyperkalemia    For the above A/P's:  It was discussed with the patient and her aunt at bedside that potential risks of mahurkar placement include but are not limited to bleeding, infection, line sepsis, arterial stick, pneumothorax with subsequent chest tube, arrhythmia, line malfunction, line displacement, DVT, vein stenosis, need for replacement or removal, clotting of the catheter and/or vessel, and inserting it in the wrong place-consent obtained and supplies gathered  We were going to do the mahurkar but then decided we are going to wait to see what her repeat potassium comes back as first since patient already has plans to have her tunneled dialysis catheter that is currently partially out, dislodged, and with concern for infection and is being replaced tomorrow.    1233-Repeat potassium just resulted and is 4.6. In secure chat with   Dr Vu Pedersen and Dr Chambers-Per Dr Vu Pedersen-we can hold off right now on the HD catheter.      Adela Downing, ORI-CNP                [1]   Allergies  Allergen Reactions    Kayexalate Seizure    Metoclopramide Hcl Other     anxious, agitated, \"skin crawl    Prochlorperazine Other     anxious, agitated, \"skin crawl    Zofran [Ondansetron Hcl] Headache    Ondansetron Other and Headache   [2] aspirin, 81 mg, oral, Daily  atorvastatin, 40 mg, oral, Nightly  calcitriol, 0.5 mcg, oral, Daily  carvedilol, 12.5 mg, oral, BID  cloNIDine, 0.3 mg, oral, q8h KIMBERLY  epoetin brandy or biosimilar, 6,000 Units, subcutaneous, Once per day on Monday Wednesday Friday  ergocalciferol, 1.25 mg, oral, Every Sunday  heparin, 5,000 Units, subcutaneous, q12h  heparin flush, 5 mL, intra-catheter, Once  levETIRAcetam, 750 mg, oral, " Nightly  pantoprazole, 40 mg, oral, Daily before breakfast  piperacillin-tazobactam, 2.25 g, intravenous, q8h  pregabalin, 75 mg, oral, Daily  sodium zirconium cyclosilicate, 10 g, oral, q8h  [3]    [4] PRN medications: acetaminophen, dextrose, dextrose, diphenhydrAMINE, glucagon, glucagon, HYDROmorphone, oxyCODONE-acetaminophen, vancomycin

## 2025-06-09 NOTE — PROGRESS NOTES
06/09/25 1256   Physical Activity   On average, how many days per week do you engage in moderate to strenuous exercise (like a brisk walk)? 0 days   On average, how many minutes do you engage in exercise at this level? 0 min   Financial Resource Strain   How hard is it for you to pay for the very basics like food, housing, medical care, and heating? Not hard   Housing Stability   In the last 12 months, was there a time when you were not able to pay the mortgage or rent on time? N   In the past 12 months, how many times have you moved where you were living? 0   At any time in the past 12 months, were you homeless or living in a shelter (including now)? N   Transportation Needs   In the past 12 months, has lack of transportation kept you from medical appointments or from getting medications? no   In the past 12 months, has lack of transportation kept you from meetings, work, or from getting things needed for daily living? No   Food Insecurity   Within the past 12 months, you worried that your food would run out before you got the money to buy more. Never true   Within the past 12 months, the food you bought just didn't last and you didn't have money to get more. Never true   Stress   Do you feel stress - tense, restless, nervous, or anxious, or unable to sleep at night because your mind is troubled all the time - these days? Not at all   Social Connections   In a typical week, how many times do you talk on the phone with family, friends, or neighbors? More than 3   How often do you get together with friends or relatives? More than 3   How often do you attend Gnosticist or Oriental orthodox services? More than 4   Do you belong to any clubs or organizations such as Gnosticist groups, unions, fraternal or athletic groups, or school groups? No   How often do you attend meetings of the clubs or organizations you belong to? Never   Are you , , , , never , or living with a partner? Living with    Intimate Partner Violence   Within the last year, have you been afraid of your partner or ex-partner? No   Within the last year, have you been humiliated or emotionally abused in other ways by your partner or ex-partner? No   Within the last year, have you been kicked, hit, slapped, or otherwise physically hurt by your partner or ex-partner? No   Within the last year, have you been raped or forced to have any kind of sexual activity by your partner or ex-partner? No   Alcohol Use   Q1: How often do you have a drink containing alcohol? Never   Q2: How many drinks containing alcohol do you have on a typical day when you are drinking? None   Q3: How often do you have six or more drinks on one occasion? Never   Utilities   In the past 12 months has the electric, gas, oil, or water company threatened to shut off services in your home? No   Health Literacy   How often do you need to have someone help you when you read instructions, pamphlets, or other written material from your doctor or pharmacy? Never   Follow-Ups   We make community resources available to all of our patients to assist with everyday needs. We may be able to connect you with those resources. Would you be interested? N

## 2025-06-09 NOTE — CONSULTS
INFECTIOUS DISEASES CONSULTATION NOTE      Referred by Dung Dixon MD    Reason For Consult  Rule out dialysis catheter infection    History Of Present Illness  Batsheva Page is a 49 y.o. female presenting with concern for dialysis catheter infection/septicemia.  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  She has previously undergone both aortic and mitral valve replacements.  Her history and anatomy is such that she has very limited options for vascular access.  Current dialysis access is through a tunneled catheter in the right femoral position.  This catheter became partially dislodged and then developed purulent drainage.  She did not have any constitutional symptoms.  She was admitted and placed on vancomycin and Zosyn and consultation is requested.  There were plans to remove the catheter today, but as she wishes to have it done under general anesthesia that will apparently be done tomorrow     Past Medical History  Please see my multiple previous consultations     Social History  Please see my multiple previous consultations    Family History  Please see my multiple previous consultations    Allergies  Kayexalate, Metoclopramide hcl, Prochlorperazine, Zofran [ondansetron hcl], and Ondansetron     Review of Systems  Detailed review of systems completed.  No significant additional positives beyond what is mentioned above    Physical Exam  Vital signs:  Visit Vitals  /70 (BP Location: Left arm, Patient Position: Sitting)   Pulse 63   Temp 36.5 °C (97.7 °F) (Oral)   Resp 18      General: Resting comfortably, not toxic, no acute distress  HEENT:  No scleral icterus or conjunctival suffusion, oral mucosa moist  Nodes:  Negative  Lungs:  Clear to auscultation  Breasts:  Not examined  Heart:  S1, S2 normal, no pathologic murmur appreciated, crisp prosthetic valve sounds  Abdomen:  Soft, nontender.   Back:  No spinal or CVA  "tenderness  Genitalia:  Not examined  Extremities: Partially dislodged dialysis catheter in the right femoral position with a large surrounding collarette of erythema.  No malodor, crepitus, tenderness  Neurologic:  Alert.  Grossly non-focal.    Skin: No mucocutaneous signs of infectious endocarditis    Relevant Results  Results from last 72 hours   Lab Units 06/09/25  1127 06/09/25  0540 06/08/25  1246   WBC AUTO x10*3/uL  --  5.1 5.8   HEMOGLOBIN g/dL 9.5* 8.8* 10.6*   HEMATOCRIT % 29.9* 28.0* 34.0*   PLATELETS AUTO x10*3/uL  --  128* 147*   NEUTROS PCT AUTO %  --   --  79.7   LYMPHS PCT AUTO %  --   --  11.2   MONOS PCT AUTO %  --   --  5.4   EOS PCT AUTO %  --   --  2.9     Results from last 72 hours   Lab Units 06/09/25  1127   CREATININE mg/dL 10.02*   ANION GAP mmol/L 23*   EGFR mL/min/1.73m*2 4*     Microbiology:  Blood (6/8): Negative X2  Right femoral wound (6/9): Pending      ASSESSMENT:  Rule out dialysis catheter infection  Catheter has become partially dislodged and has surrounding erythema.  Blood cultures negative to date.  Extensive prior history of MSSA and MRSA catheter-associated infection      PLANS:  -   Continue vancomycin and Zosyn pending further observation and incubation of cultures  -   Anticipate removal of the dialysis catheter on 6/10  -   Continue \"vancomycin dwell\" after each dialysis       THANK YOU FOR ASKING ME TO ASSIST YOU IN THE CARE OF YOUR PATIENT    Adama Soto MD  [email or web address] (formerly [email or web address]  Office:  [phone]      "

## 2025-06-09 NOTE — CARE PLAN
The patient's goals for the shift include ir- new cath?     The clinical goals for the shift include Control pain.dialysis

## 2025-06-09 NOTE — ASSESSMENT & PLAN NOTE
- per patient HD line sliding out and noted erythema and purulent drainage  -blood cultures are in progress  - continue Zosyn and vancomycin  - wound culture ordered  - ID consultation placed  - IR consultation placed  - Hx of MSSA/MRSA bacteremia    - pain/itching management: Dilaudid 0.4mg IV Q 3 hours with Benadryl 50mg IV Q 3 hours, regimen has worked for her on previous admissions    ESRD  - HD-> M/W/F last HD completed 6/6  - nephrology consult placed    Hyperkalemia  -potassium 5.9  -refused lokelma, states cannot tolerate. Give IV insulin and D50 per nephrology  -repeat this afternoon    HTN  - Systolic in the 190s on admission with chest tightness-symptoms resolved once back on home regimen  - resumed home medications  - monitor blood pressure close    Anemia  -Likely secondary to CKD vs ABNER  -Check iron panel, occult stool  -Hemoglobin dropped from 10.6 to 8.8, repeat -H/H this afternoon  -PPI    Seizure  - resumed home medication  - seizure precaution    CAD  - ASA and statin  - no chest pain currently    Anxiety/depression  - resumed home medications     GI prophylaxis  -PPI    DVT prophylaxis  - subcutaneous heparin     Plan  Admit to tele  Monitor fluid/electrolytes close  Pain/itching regimen adjusted  IR consult for HD line exchange  Dialysis per nephrology once access is obtained  Continue ATB, follow cultures, ID on consult  DVT prophylaxis  Repeat potassium and H/H this afternoon  CBC and BMP in AM

## 2025-06-09 NOTE — PROGRESS NOTES
TCC met with patient. Assessment complete. Patient lives with her significant other. Patient typically independent. Patient does dialysis at [facility] on MWF. Patient does not have a PCP, but follows Dr. Zhou Pedersen. Patient fills prescriptions at [facility] in [place]. At this time the discharge plan is for patient to return home with no skilled services. Will follow.      06/09/25 7958   Discharge Planning   Living Arrangements Spouse/significant other   Support Systems Spouse/significant other;Family members   Type of Residence Private residence   Home or Post Acute Services None   Expected Discharge Disposition Home   Does the patient need discharge transport arranged? No   Financial Resource Strain   How hard is it for you to pay for the very basics like food, housing, medical care, and heating? Not hard   Housing Stability   In the last 12 months, was there a time when you were not able to pay the mortgage or rent on time? N   In the past 12 months, how many times have you moved where you were living? 0   At any time in the past 12 months, were you homeless or living in a shelter (including now)? N   Transportation Needs   In the past 12 months, has lack of transportation kept you from medical appointments or from getting medications? no   In the past 12 months, has lack of transportation kept you from meetings, work, or from getting things needed for daily living? No

## 2025-06-09 NOTE — PROGRESS NOTES
Vancomycin Dosing by Pharmacy- [facility]    Batsheva Page is a 49 y.o. year old female who Pharmacy has been consulted for vancomycin dosing for cellulitis/skin and soft tissue infection and infected HD line. Based on the patient's indication and renal status this patient will be dosed based on a target level of Other Indication: 15-20 mcg/mL    Patient is currently in [place]    Last vancomycin dose (incl. date and time): 1000 mg on 6/8 at 1445.    Vancomycin level (incl. date and time): 20.8 mcg/mL on 6/9 at 0540    Lab Results   Component Value Date    VANCORANDOM 20.8 (H) 06/09/2025    VANCOTROUGH 27.9 (HH) 09/26/2022       Visit Vitals  /70 (BP Location: Left arm, Patient Position: Sitting)   Pulse 63   Temp 36.5 °C (97.7 °F) (Oral)   Resp 18           Lab Results   Component Value Date    CREATININE 10.02 (H) 06/09/2025    CREATININE 8.91 (H) 06/09/2025    CREATININE 7.60 (H) 06/08/2025    CREATININE 6.92 (H) 04/27/2025       I/O last 3 completed shifts:  In: 300 (4.3 mL/kg) [P.O.:200; IV Piggyback:100]  Out: 0 (0 mL/kg)   Weight: 69.4 kg     Assessment/Plan     Level is above target trough goal.   hold vancomycin.  Random level within 24 hours will be obtained on 6/10 at 0500. May be obtained sooner if clinically indicated.   Will continue to monitor renal function daily while on vancomycin and order serum creatinine at least every 48 hours if not already ordered.  Follow for continued vancomycin needs, clinical response, and signs/symptoms of toxicity.     Jesenia Lee, PharmD

## 2025-06-09 NOTE — CARE PLAN
The patient's goals for the shift include pain control    The clinical goals for the shift include pain control    Over the shift, the patient did not make progress toward the following goals. Barriers to progression include . Recommendations to address these barriers include   Problem: Pain - Adult  Goal: Verbalizes/displays adequate comfort level or baseline comfort level  Outcome: Progressing     Problem: Safety - Adult  Goal: Free from fall injury  Outcome: Progressing     Problem: Discharge Planning  Goal: Discharge to home or other facility with appropriate resources  Outcome: Progressing     Problem: Chronic Conditions and Co-morbidities  Goal: Patient's chronic conditions and co-morbidity symptoms are monitored and maintained or improved  Outcome: Progressing     Problem: Nutrition  Goal: Nutrient intake appropriate for maintaining nutritional needs  Outcome: Progressing     Problem: Pain  Goal: Takes deep breaths with improved pain control throughout the shift  Outcome: Progressing  Goal: Turns in bed with improved pain control throughout the shift  Outcome: Progressing  Goal: Walks with improved pain control throughout the shift  Outcome: Progressing  Goal: Performs ADL's with improved pain control throughout shift  Outcome: Progressing  Goal: Participates in PT with improved pain control throughout the shift  Outcome: Progressing  Goal: Free from opioid side effects throughout the shift  Outcome: Progressing  Goal: Free from acute confusion related to pain meds throughout the shift  Outcome: Progressing   .

## 2025-06-09 NOTE — NURSING NOTE
Upon rounding, right femoral permacath with plain tegaderm dressing not dated. No CHG and edge of dressing sitting on insertion site.  Routine dressing change done, site without redness, edema or bleeding noted. Sutures are not in patients skin and line cuff is exposed, patient states line was accidentally pulled during a recent dialysis visit. Patient states line to be removed tomorrow and new line inserted.

## 2025-06-09 NOTE — CONSULTS
Reason For Consult  End Stage renal disease on hemodialysis    History Of Present Illness  Batsheva Page is a 49 y.o. female with a past medical history of end-stage renal disease on hemodialysis, congestive heart failure, hypertension, history of multiple line infections who presents to the hospital with dislodged dialysis catheter with the cuff exposed, some drainage and infection at the site.  Patient reports pain at the site of the dialysis catheter otherwise she denies any symptoms.  She was supposed to go for tunneled dialysis catheter exchange today but she is refusing to go without anesthesia and I was informed that there is no anesthesia available with interventional radiology to replace the dialysis catheter until noon tomorrow at the earliest.  Her potassium this morning was 5.9, mildly hemolyzed. Received insulin dextrose, notably she refused the Lokelma.  We are consulted for management of end-stage renal disease on hemodialysis.     Past Medical History  She has a past medical history of Aortic valve replaced (2022), CHF (congestive heart failure), Chronic pain, Coronary artery disease, Disease of thyroid gland, ESRD (end stage renal disease) (Multi), H/O mitral valve replacement (2013), Heart disease, History of transfusion, Hypertension, Mitral valve regurgitation, Seizures (Multi), and Stroke (Multi).    Surgical History  She has a past surgical history that includes IR CVC tunneled (09/09/2022); IR CVC tunneled (12/28/2022); US guided percutaneous placement (07/14/2022); Parathyroidectomy; Coronary artery bypass graft; Aortic valve replacement (09/26/2022); Mitral valve replacement (09/26/2022); Mitral valve repair (2013); Tricuspid valvuloplasty (2013); IR CVC (01/12/2024); IR CVC (05/07/2024); and IR CVC (08/20/2024).     Social History  She reports that she has never smoked. She has never used smokeless tobacco. She reports that she does not drink alcohol and does not use drugs.    Family  "History  Family History[1]     Allergies  Kayexalate, Metoclopramide hcl, Prochlorperazine, Zofran [ondansetron hcl], and Ondansetron    Review of Systems  10 point review of systems was obtained and is negative other than what is indicated above in the HPI     Physical Exam  General: Awake, alert, no acute distress  Head/ears/nose/throat:  Normocephalic, atraumatic, moist mucous membranes  Respiratory: Diminished breath sounds in the right lung field, normal respiratory effort  Cardiovascular:  S1 and S2, no rubs  Gastrointestinal:  Soft, positive bowel sounds, no rebound or guarding  Extremities: 1+ edema, no cyanosis  Musculoskeletal:  No injury or deformity noted  Neurologic:  Alert, responsive, cooperative with exam  Skin:  No jaundice, dry          I&O 24HR    Intake/Output Summary (Last 24 hours) at 6/9/2025 1109  Last data filed at 6/9/2025 0600  Gross per 24 hour   Intake 300 ml   Output 0 ml   Net 300 ml       Vitals 24HR  Heart Rate:  [61-84]   Temp:  [36.3 °C (97.3 °F)-36.7 °C (98.1 °F)]   Resp:  [16-18]   BP: (137-192)/(70-93)   Height:  [165.1 cm (5' 5\")]   Weight:  [69.4 kg (153 lb)]   SpO2:  [99 %-100 %]       Relevant Results  Results for orders placed or performed during the hospital encounter of 06/08/25 (from the past 24 hours)   CBC and Auto Differential   Result Value Ref Range    WBC 5.8 4.4 - 11.3 x10*3/uL    nRBC 0.0 0.0 - 0.0 /100 WBCs    RBC 3.41 (L) 4.00 - 5.20 x10*6/uL    Hemoglobin 10.6 (L) 12.0 - 16.0 g/dL    Hematocrit 34.0 (L) 36.0 - 46.0 %     80 - 100 fL    MCH 31.1 26.0 - 34.0 pg    MCHC 31.2 (L) 32.0 - 36.0 g/dL    RDW 15.9 (H) 11.5 - 14.5 %    Platelets 147 (L) 150 - 450 x10*3/uL    Neutrophils % 79.7 40.0 - 80.0 %    Immature Granulocytes %, Automated 0.3 0.0 - 0.9 %    Lymphocytes % 11.2 13.0 - 44.0 %    Monocytes % 5.4 2.0 - 10.0 %    Eosinophils % 2.9 0.0 - 6.0 %    Basophils % 0.5 0.0 - 2.0 %    Neutrophils Absolute 4.61 1.20 - 7.70 x10*3/uL    Immature Granulocytes " Absolute, Automated 0.02 0.00 - 0.70 x10*3/uL    Lymphocytes Absolute 0.65 (L) 1.20 - 4.80 x10*3/uL    Monocytes Absolute 0.31 0.10 - 1.00 x10*3/uL    Eosinophils Absolute 0.17 0.00 - 0.70 x10*3/uL    Basophils Absolute 0.03 0.00 - 0.10 x10*3/uL   Comprehensive metabolic panel   Result Value Ref Range    Glucose 90 74 - 99 mg/dL    Sodium 131 (L) 136 - 145 mmol/L    Potassium 5.2 3.5 - 5.3 mmol/L    Chloride 90 (L) 98 - 107 mmol/L    Bicarbonate 24 21 - 32 mmol/L    Anion Gap 22 (H) 10 - 20 mmol/L    Urea Nitrogen 44 (H) 6 - 23 mg/dL    Creatinine 7.60 (H) 0.50 - 1.05 mg/dL    eGFR 6 (L) >60 mL/min/1.73m*2    Calcium 7.5 (L) 8.6 - 10.3 mg/dL    Albumin 4.4 3.4 - 5.0 g/dL    Alkaline Phosphatase 45 33 - 110 U/L    Total Protein 9.2 (H) 6.4 - 8.2 g/dL    AST 16 9 - 39 U/L    Bilirubin, Total 0.5 0.0 - 1.2 mg/dL    ALT 7 7 - 45 U/L   Lactate   Result Value Ref Range    Lactate 0.9 0.4 - 2.0 mmol/L   Blood Culture    Specimen: Peripheral Venipuncture; Blood culture   Result Value Ref Range    Blood Culture Loaded on Instrument - Culture in progress    Blood Culture    Specimen: Peripheral Venipuncture; Blood culture   Result Value Ref Range    Blood Culture Loaded on Instrument - Culture in progress    CBC   Result Value Ref Range    WBC 5.1 4.4 - 11.3 x10*3/uL    nRBC 0.0 0.0 - 0.0 /100 WBCs    RBC 2.84 (L) 4.00 - 5.20 x10*6/uL    Hemoglobin 8.8 (L) 12.0 - 16.0 g/dL    Hematocrit 28.0 (L) 36.0 - 46.0 %    MCV 99 80 - 100 fL    MCH 31.0 26.0 - 34.0 pg    MCHC 31.4 (L) 32.0 - 36.0 g/dL    RDW 15.9 (H) 11.5 - 14.5 %    Platelets 128 (L) 150 - 450 x10*3/uL   Basic metabolic panel   Result Value Ref Range    Glucose 87 74 - 99 mg/dL    Sodium 132 (L) 136 - 145 mmol/L    Potassium 5.9 (H) 3.5 - 5.3 mmol/L    Chloride 94 (L) 98 - 107 mmol/L    Bicarbonate 21 21 - 32 mmol/L    Anion Gap 23 (H) 10 - 20 mmol/L    Urea Nitrogen 55 (H) 6 - 23 mg/dL    Creatinine 8.91 (H) 0.50 - 1.05 mg/dL    eGFR 5 (L) >60 mL/min/1.73m*2     Calcium 6.4 (L) 8.6 - 10.3 mg/dL   Vancomycin   Result Value Ref Range    Vancomycin 20.8 (H) 5.0 - 20.0 ug/mL   Iron and TIBC   Result Value Ref Range    Iron 43 35 - 150 ug/dL    UIBC 135 110 - 370 ug/dL    TIBC 178 (L) 240 - 445 ug/dL    % Saturation 24 (L) 25 - 45 %          Assessment & Plan  Complication associated with dialysis catheter    End-stage renal disease on hemodialysis, with a dislodged tunneled dialysis catheter and concern for line infection  Hyperkalemia  Hypertension  Anemia    Plan: Patient was supposed to go for tunneled dialysis catheter exchange today but she is refusing to go without anesthesia and I was informed that there is no anesthesia available with interventional radiology to replace the dialysis catheter until noon tomorrow at the earliest.  Her potassium this morning was 5.9, mildly hemolyzed. Received insulin and dextrose, notably she refused the Lokelma. Repeat renal function panel pending. I have consulted and asked general surgery if they can place a temporary dialysis catheter in the meantime so patient can be dialyzed today. They are asking about waiting to see the repeat renal function panel after medical treatment first. I believe even if the potassium temporarily improves with insulin and dextrose then it will rebound up again given ESRD and needing HD. Awaiting repeat labs now. Ensure renal diet. Dialysis orders for today with 2K bath are in place for when HD line is placed. Thank you for your consultation.     Vu Pedersen MD         [1]   Family History  Problem Relation Name Age of Onset    No Known Problems Mother      No Known Problems Father

## 2025-06-10 ENCOUNTER — APPOINTMENT (OUTPATIENT)
Dept: DIALYSIS | Facility: HOSPITAL | Age: 50
End: 2025-06-10
Payer: MEDICARE

## 2025-06-10 ENCOUNTER — ANESTHESIA (OUTPATIENT)
Dept: CARDIOLOGY | Facility: HOSPITAL | Age: 50
End: 2025-06-10
Payer: MEDICARE

## 2025-06-10 ENCOUNTER — APPOINTMENT (OUTPATIENT)
Dept: CARDIOLOGY | Facility: HOSPITAL | Age: 50
DRG: 314 | End: 2025-06-10
Payer: MEDICARE

## 2025-06-10 ENCOUNTER — ANESTHESIA EVENT (OUTPATIENT)
Dept: CARDIOLOGY | Facility: HOSPITAL | Age: 50
End: 2025-06-10
Payer: MEDICARE

## 2025-06-10 LAB
ALBUMIN SERPL BCP-MCNC: 3.5 G/DL (ref 3.4–5)
ANION GAP SERPL CALCULATED.3IONS-SCNC: 28 MMOL/L (ref 10–20)
BUN SERPL-MCNC: 65 MG/DL (ref 6–23)
CALCIUM SERPL-MCNC: 6.4 MG/DL (ref 8.6–10.3)
CHLORIDE SERPL-SCNC: 94 MMOL/L (ref 98–107)
CO2 SERPL-SCNC: 17 MMOL/L (ref 21–32)
CREAT SERPL-MCNC: 10.32 MG/DL (ref 0.5–1.05)
EGFRCR SERPLBLD CKD-EPI 2021: 4 ML/MIN/1.73M*2
ERYTHROCYTE [DISTWIDTH] IN BLOOD BY AUTOMATED COUNT: 16.1 % (ref 11.5–14.5)
GLUCOSE BLD MANUAL STRIP-MCNC: 105 MG/DL (ref 74–99)
GLUCOSE BLD MANUAL STRIP-MCNC: 105 MG/DL (ref 74–99)
GLUCOSE SERPL-MCNC: 88 MG/DL (ref 74–99)
HCT VFR BLD AUTO: 28.5 % (ref 36–46)
HGB BLD-MCNC: 8.8 G/DL (ref 12–16)
MCH RBC QN AUTO: 31.7 PG (ref 26–34)
MCHC RBC AUTO-ENTMCNC: 30.9 G/DL (ref 32–36)
MCV RBC AUTO: 103 FL (ref 80–100)
NRBC BLD-RTO: 0 /100 WBCS (ref 0–0)
PHOSPHATE SERPL-MCNC: 8.1 MG/DL (ref 2.5–4.9)
PLATELET # BLD AUTO: 130 X10*3/UL (ref 150–450)
POTASSIUM SERPL-SCNC: 5.6 MMOL/L (ref 3.5–5.3)
RBC # BLD AUTO: 2.78 X10*6/UL (ref 4–5.2)
SODIUM SERPL-SCNC: 133 MMOL/L (ref 136–145)
STAPHYLOCOCCUS SPEC CULT: ABNORMAL
VANCOMYCIN SERPL-MCNC: 18.7 UG/ML (ref 5–20)
WBC # BLD AUTO: 4.8 X10*3/UL (ref 4.4–11.3)

## 2025-06-10 PROCEDURE — 8010000001 HC DIALYSIS - HEMODIALYSIS PER DAY

## 2025-06-10 PROCEDURE — 5A1D70Z PERFORMANCE OF URINARY FILTRATION, INTERMITTENT, LESS THAN 6 HOURS PER DAY: ICD-10-PCS | Performed by: INTERNAL MEDICINE

## 2025-06-10 PROCEDURE — 2500000004 HC RX 250 GENERAL PHARMACY W/ HCPCS (ALT 636 FOR OP/ED): Performed by: NURSE PRACTITIONER

## 2025-06-10 PROCEDURE — 36415 COLL VENOUS BLD VENIPUNCTURE: CPT | Performed by: INTERNAL MEDICINE

## 2025-06-10 PROCEDURE — 2500000004 HC RX 250 GENERAL PHARMACY W/ HCPCS (ALT 636 FOR OP/ED): Performed by: ANESTHESIOLOGIST ASSISTANT

## 2025-06-10 PROCEDURE — A36558 PR INSERT TUNNELED CV CATH W/O PORT OR PUMP: Performed by: ANESTHESIOLOGY

## 2025-06-10 PROCEDURE — 77001 FLUOROGUIDE FOR VEIN DEVICE: CPT | Performed by: STUDENT IN AN ORGANIZED HEALTH CARE EDUCATION/TRAINING PROGRAM

## 2025-06-10 PROCEDURE — C1769 GUIDE WIRE: HCPCS

## 2025-06-10 PROCEDURE — 2500000005 HC RX 250 GENERAL PHARMACY W/O HCPCS: Performed by: ANESTHESIOLOGIST ASSISTANT

## 2025-06-10 PROCEDURE — 2500000001 HC RX 250 WO HCPCS SELF ADMINISTERED DRUGS (ALT 637 FOR MEDICARE OP): Performed by: INTERNAL MEDICINE

## 2025-06-10 PROCEDURE — 2500000004 HC RX 250 GENERAL PHARMACY W/ HCPCS (ALT 636 FOR OP/ED): Performed by: STUDENT IN AN ORGANIZED HEALTH CARE EDUCATION/TRAINING PROGRAM

## 2025-06-10 PROCEDURE — 2500000001 HC RX 250 WO HCPCS SELF ADMINISTERED DRUGS (ALT 637 FOR MEDICARE OP): Performed by: NURSE PRACTITIONER

## 2025-06-10 PROCEDURE — 82947 ASSAY GLUCOSE BLOOD QUANT: CPT

## 2025-06-10 PROCEDURE — 36581 REPLACE TUNNELED CV CATH: CPT | Performed by: STUDENT IN AN ORGANIZED HEALTH CARE EDUCATION/TRAINING PROGRAM

## 2025-06-10 PROCEDURE — 1200000002 HC GENERAL ROOM WITH TELEMETRY DAILY

## 2025-06-10 PROCEDURE — 80069 RENAL FUNCTION PANEL: CPT | Performed by: INTERNAL MEDICINE

## 2025-06-10 PROCEDURE — 85027 COMPLETE CBC AUTOMATED: CPT | Performed by: INTERNAL MEDICINE

## 2025-06-10 PROCEDURE — A36558 PR INSERT TUNNELED CV CATH W/O PORT OR PUMP: Performed by: ANESTHESIOLOGIST ASSISTANT

## 2025-06-10 PROCEDURE — 7100000002 HC RECOVERY ROOM TIME - EACH INCREMENTAL 1 MINUTE

## 2025-06-10 PROCEDURE — 80202 ASSAY OF VANCOMYCIN: CPT | Performed by: NURSE PRACTITIONER

## 2025-06-10 PROCEDURE — 76937 US GUIDE VASCULAR ACCESS: CPT | Performed by: STUDENT IN AN ORGANIZED HEALTH CARE EDUCATION/TRAINING PROGRAM

## 2025-06-10 PROCEDURE — 3700000001 HC GENERAL ANESTHESIA TIME - INITIAL BASE CHARGE

## 2025-06-10 PROCEDURE — 2500000005 HC RX 250 GENERAL PHARMACY W/O HCPCS: Performed by: INTERNAL MEDICINE

## 2025-06-10 PROCEDURE — C1894 INTRO/SHEATH, NON-LASER: HCPCS

## 2025-06-10 PROCEDURE — 99232 SBSQ HOSP IP/OBS MODERATE 35: CPT | Performed by: NURSE PRACTITIONER

## 2025-06-10 PROCEDURE — 7100000001 HC RECOVERY ROOM TIME - INITIAL BASE CHARGE

## 2025-06-10 PROCEDURE — 2720000007 HC OR 272 NO HCPCS

## 2025-06-10 PROCEDURE — 3700000002 HC GENERAL ANESTHESIA TIME - EACH INCREMENTAL 1 MINUTE

## 2025-06-10 RX ORDER — DEXMEDETOMIDINE IN 0.9 % NACL 20 MCG/5ML
SYRINGE (ML) INTRAVENOUS AS NEEDED
Status: DISCONTINUED | OUTPATIENT
Start: 2025-06-10 | End: 2025-06-10

## 2025-06-10 RX ORDER — SODIUM CHLORIDE, SODIUM LACTATE, POTASSIUM CHLORIDE, CALCIUM CHLORIDE 600; 310; 30; 20 MG/100ML; MG/100ML; MG/100ML; MG/100ML
100 INJECTION, SOLUTION INTRAVENOUS CONTINUOUS
Status: DISCONTINUED | OUTPATIENT
Start: 2025-06-10 | End: 2025-06-10

## 2025-06-10 RX ORDER — MIDAZOLAM HYDROCHLORIDE 1 MG/ML
INJECTION, SOLUTION INTRAMUSCULAR; INTRAVENOUS AS NEEDED
Status: DISCONTINUED | OUTPATIENT
Start: 2025-06-10 | End: 2025-06-10

## 2025-06-10 RX ORDER — DEXTROSE 50 % IN WATER (D50W) INTRAVENOUS SYRINGE
25 ONCE
Status: DISCONTINUED | OUTPATIENT
Start: 2025-06-10 | End: 2025-06-10

## 2025-06-10 RX ORDER — KETAMINE HYDROCHLORIDE 100 MG/ML
INJECTION, SOLUTION INTRAMUSCULAR; INTRAVENOUS AS NEEDED
Status: DISCONTINUED | OUTPATIENT
Start: 2025-06-10 | End: 2025-06-10

## 2025-06-10 RX ORDER — HEPARIN SODIUM 1000 [USP'U]/ML
1000 INJECTION, SOLUTION INTRAVENOUS; SUBCUTANEOUS
Status: DISCONTINUED | OUTPATIENT
Start: 2025-06-10 | End: 2025-06-11

## 2025-06-10 RX ORDER — VANCOMYCIN 750 MG/150ML
750 INJECTION, SOLUTION INTRAVENOUS ONCE
Status: COMPLETED | OUTPATIENT
Start: 2025-06-10 | End: 2025-06-10

## 2025-06-10 RX ORDER — FENTANYL CITRATE 50 UG/ML
25 INJECTION, SOLUTION INTRAMUSCULAR; INTRAVENOUS EVERY 5 MIN PRN
Status: ACTIVE | OUTPATIENT
Start: 2025-06-10 | End: 2025-06-10

## 2025-06-10 RX ORDER — FENTANYL CITRATE 50 UG/ML
50 INJECTION, SOLUTION INTRAMUSCULAR; INTRAVENOUS EVERY 5 MIN PRN
Status: ACTIVE | OUTPATIENT
Start: 2025-06-10 | End: 2025-06-10

## 2025-06-10 RX ORDER — OXYCODONE AND ACETAMINOPHEN 5; 325 MG/1; MG/1
1 TABLET ORAL EVERY 6 HOURS PRN
Refills: 0 | Status: DISCONTINUED | OUTPATIENT
Start: 2025-06-10 | End: 2025-06-13

## 2025-06-10 RX ORDER — LIDOCAINE HYDROCHLORIDE 10 MG/ML
0.1 INJECTION, SOLUTION INFILTRATION; PERINEURAL ONCE
Status: DISCONTINUED | OUTPATIENT
Start: 2025-06-10 | End: 2025-06-17

## 2025-06-10 RX ORDER — SODIUM BICARBONATE 1 MEQ/ML
50 SYRINGE (ML) INTRAVENOUS ONCE
Status: COMPLETED | OUTPATIENT
Start: 2025-06-10 | End: 2025-06-10

## 2025-06-10 RX ORDER — SODIUM CHLORIDE 9 MG/ML
20 INJECTION, SOLUTION INTRAVENOUS CONTINUOUS
Status: DISCONTINUED | OUTPATIENT
Start: 2025-06-10 | End: 2025-06-10

## 2025-06-10 RX ORDER — LIDOCAINE HYDROCHLORIDE 10 MG/ML
INJECTION, SOLUTION EPIDURAL; INFILTRATION; INTRACAUDAL; PERINEURAL AS NEEDED
Status: DISCONTINUED | OUTPATIENT
Start: 2025-06-10 | End: 2025-06-10 | Stop reason: HOSPADM

## 2025-06-10 RX ORDER — FENTANYL CITRATE 50 UG/ML
INJECTION, SOLUTION INTRAMUSCULAR; INTRAVENOUS CONTINUOUS PRN
Status: DISCONTINUED | OUTPATIENT
Start: 2025-06-10 | End: 2025-06-10

## 2025-06-10 RX ORDER — PROPOFOL 10 MG/ML
INJECTION, EMULSION INTRAVENOUS CONTINUOUS PRN
Status: DISCONTINUED | OUTPATIENT
Start: 2025-06-10 | End: 2025-06-10

## 2025-06-10 RX ADMIN — DIPHENHYDRAMINE HYDROCHLORIDE 50 MG: 50 INJECTION, SOLUTION INTRAMUSCULAR; INTRAVENOUS at 07:54

## 2025-06-10 RX ADMIN — SODIUM BICARBONATE 50 MEQ: 84 INJECTION INTRAVENOUS at 09:22

## 2025-06-10 RX ADMIN — PROPOFOL 100 MCG/KG/MIN: 10 INJECTION, EMULSION INTRAVENOUS at 12:33

## 2025-06-10 RX ADMIN — Medication 20 MCG: at 12:26

## 2025-06-10 RX ADMIN — DIPHENHYDRAMINE HYDROCHLORIDE 50 MG: 50 INJECTION, SOLUTION INTRAMUSCULAR; INTRAVENOUS at 17:44

## 2025-06-10 RX ADMIN — DIPHENHYDRAMINE HYDROCHLORIDE 50 MG: 50 INJECTION, SOLUTION INTRAMUSCULAR; INTRAVENOUS at 20:59

## 2025-06-10 RX ADMIN — HYDROMORPHONE HYDROCHLORIDE 0.4 MG: 1 INJECTION, SOLUTION INTRAMUSCULAR; INTRAVENOUS; SUBCUTANEOUS at 14:41

## 2025-06-10 RX ADMIN — SODIUM CHLORIDE: 900 INJECTION, SOLUTION INTRAVENOUS at 12:28

## 2025-06-10 RX ADMIN — Medication 4 L/MIN: at 12:31

## 2025-06-10 RX ADMIN — DIPHENHYDRAMINE HYDROCHLORIDE 50 MG: 50 INJECTION, SOLUTION INTRAMUSCULAR; INTRAVENOUS at 14:41

## 2025-06-10 RX ADMIN — SODIUM CHLORIDE 20 ML/HR: 900 INJECTION, SOLUTION INTRAVENOUS at 11:40

## 2025-06-10 RX ADMIN — PIPERACILLIN SODIUM AND TAZOBACTAM SODIUM 2.25 G: 2; .25 INJECTION, SOLUTION INTRAVENOUS at 05:43

## 2025-06-10 RX ADMIN — HYDROMORPHONE HYDROCHLORIDE 0.4 MG: 1 INJECTION, SOLUTION INTRAMUSCULAR; INTRAVENOUS; SUBCUTANEOUS at 10:57

## 2025-06-10 RX ADMIN — VANCOMYCIN 750 MG: 750 INJECTION, SOLUTION INTRAVENOUS at 17:59

## 2025-06-10 RX ADMIN — DIPHENHYDRAMINE HYDROCHLORIDE 50 MG: 50 INJECTION, SOLUTION INTRAMUSCULAR; INTRAVENOUS at 03:18

## 2025-06-10 RX ADMIN — KETAMINE HYDROCHLORIDE 100 MG: 100 INJECTION INTRAMUSCULAR; INTRAVENOUS at 12:31

## 2025-06-10 RX ADMIN — CLONIDINE HYDROCHLORIDE 0.3 MG: 0.2 TABLET ORAL at 21:00

## 2025-06-10 RX ADMIN — LIDOCAINE HYDROCHLORIDE 5 ML: 10 INJECTION, SOLUTION EPIDURAL; INFILTRATION; INTRACAUDAL; PERINEURAL at 12:48

## 2025-06-10 RX ADMIN — Medication 20 MCG: at 12:20

## 2025-06-10 RX ADMIN — HYDROMORPHONE HYDROCHLORIDE 0.4 MG: 1 INJECTION, SOLUTION INTRAMUSCULAR; INTRAVENOUS; SUBCUTANEOUS at 17:44

## 2025-06-10 RX ADMIN — SODIUM CHLORIDE: 9 INJECTION, SOLUTION INTRAVENOUS at 12:28

## 2025-06-10 RX ADMIN — CLONIDINE HYDROCHLORIDE 0.3 MG: 0.2 TABLET ORAL at 05:41

## 2025-06-10 RX ADMIN — MIDAZOLAM 2 MG: 1 INJECTION INTRAMUSCULAR; INTRAVENOUS at 12:20

## 2025-06-10 RX ADMIN — HYDROMORPHONE HYDROCHLORIDE 0.4 MG: 1 INJECTION, SOLUTION INTRAMUSCULAR; INTRAVENOUS; SUBCUTANEOUS at 20:59

## 2025-06-10 RX ADMIN — KETAMINE HYDROCHLORIDE 100 MG: 100 INJECTION INTRAMUSCULAR; INTRAVENOUS at 12:25

## 2025-06-10 RX ADMIN — HYDROMORPHONE HYDROCHLORIDE 0.4 MG: 1 INJECTION, SOLUTION INTRAMUSCULAR; INTRAVENOUS; SUBCUTANEOUS at 07:53

## 2025-06-10 RX ADMIN — HYDROMORPHONE HYDROCHLORIDE 0.4 MG: 1 INJECTION, SOLUTION INTRAMUSCULAR; INTRAVENOUS; SUBCUTANEOUS at 03:18

## 2025-06-10 RX ADMIN — CARVEDILOL 12.5 MG: 12.5 TABLET, FILM COATED ORAL at 20:59

## 2025-06-10 SDOH — HEALTH STABILITY: MENTAL HEALTH: CURRENT SMOKER: 0

## 2025-06-10 ASSESSMENT — PAIN - FUNCTIONAL ASSESSMENT
PAIN_FUNCTIONAL_ASSESSMENT: 0-10
PAIN_FUNCTIONAL_ASSESSMENT: CPOT (CRITICAL CARE PAIN OBSERVATION TOOL)
PAIN_FUNCTIONAL_ASSESSMENT: WONG-BAKER FACES
PAIN_FUNCTIONAL_ASSESSMENT: 0-10
PAIN_FUNCTIONAL_ASSESSMENT: WONG-BAKER FACES
PAIN_FUNCTIONAL_ASSESSMENT: 0-10

## 2025-06-10 ASSESSMENT — COGNITIVE AND FUNCTIONAL STATUS - GENERAL
MOBILITY SCORE: 24
DAILY ACTIVITIY SCORE: 24
DAILY ACTIVITIY SCORE: 24
MOBILITY SCORE: 24

## 2025-06-10 ASSESSMENT — PAIN DESCRIPTION - LOCATION
LOCATION: GROIN

## 2025-06-10 ASSESSMENT — PAIN SCALES - GENERAL
PAINLEVEL_OUTOF10: 8
PAINLEVEL_OUTOF10: 10 - WORST POSSIBLE PAIN
PAIN_LEVEL: 3
PAINLEVEL_OUTOF10: 10 - WORST POSSIBLE PAIN
PAINLEVEL_OUTOF10: 0 - NO PAIN
PAINLEVEL_OUTOF10: 8
PAINLEVEL_OUTOF10: 8
PAINLEVEL_OUTOF10: 10 - WORST POSSIBLE PAIN
PAINLEVEL_OUTOF10: 10 - WORST POSSIBLE PAIN
PAINLEVEL_OUTOF10: 0 - NO PAIN
PAINLEVEL_OUTOF10: 10 - WORST POSSIBLE PAIN
PAINLEVEL_OUTOF10: 10 - WORST POSSIBLE PAIN
PAINLEVEL_OUTOF10: 0 - NO PAIN
PAINLEVEL_OUTOF10: 2
PAINLEVEL_OUTOF10: 0 - NO PAIN

## 2025-06-10 ASSESSMENT — PAIN DESCRIPTION - DESCRIPTORS
DESCRIPTORS: SHARP
DESCRIPTORS: ACHING;DISCOMFORT
DESCRIPTORS: SHARP
DESCRIPTORS: SHARP
DESCRIPTORS: ACHING
DESCRIPTORS: ACHING
DESCRIPTORS: SHARP

## 2025-06-10 ASSESSMENT — PAIN SCALES - WONG BAKER
WONGBAKER_NUMERICALRESPONSE: HURTS WORST
WONGBAKER_NUMERICALRESPONSE: NO HURT

## 2025-06-10 ASSESSMENT — PAIN DESCRIPTION - ORIENTATION
ORIENTATION: RIGHT

## 2025-06-10 NOTE — ANESTHESIA PREPROCEDURE EVALUATION
"Patient: Batsheva Page    Procedure Information       Date/Time: 06/10/25 1200    Scheduled providers: Melvin Medina MD; DANIELLA Ritter; Chava Fernández MD    Procedure: IR CVC TUNNELED    Location: [facility]            Visit Vitals  /88 (BP Location: Right arm, Patient Position: Sitting)   Pulse 61   Temp 36.8 °C (98.2 °F) (Tympanic)   Resp 18   Ht 1.651 m (5' 5\")   Wt 69 kg (152 lb 1.9 oz)   SpO2 96%   BMI 25.31 kg/m²   OB Status Menopausal   Smoking Status Never   BSA 1.78 m²        Current Outpatient Medications   Medication Instructions    acetaminophen (Tylenol) 325 mg tablet Take 2 tablets (650 mg) by mouth every 6 hours as needed for mild pain (1 - 3).    aspirin 81 mg, oral, Daily    atorvastatin (LIPITOR) 40 mg, Daily    calcitriol (ROCALTROL) 0.5 mcg, Daily    carvedilol (COREG) 12.5 mg, oral, 2 times daily    cloNIDine (CATAPRES) 0.3 mg, oral, Every 8 hours scheduled    epoetin brandy-epbx (RETACRIT) 100 Units/kg, subcutaneous, 3 times weekly    ergocalciferol (Vitamin D-2) 1.25 MG (84941 UT) capsule 1 capsule, Once Weekly    hydrOXYzine HCL (ATARAX) 25 mg, oral, Every 6 hours PRN    levETIRAcetam (KEPPRA) 750 mg, Nightly    oxyCODONE-acetaminophen (Percocet) 5-325 mg tablet Take 1 tablet by mouth every 6 hours as needed for moderate pain (4 - 6).    pregabalin (LYRICA) 50 mg, oral, 2 times daily    promethazine (PHENERGAN) 25 mg, Daily PRN    vancomycin 5 mg/mL in sodium chloride 0.9% solution 2 mL, intra-catheter, 3 times weekly        Allergies[1]     Surgical History[2]     Relevant Problems   Cardiac   (+) Arteriosclerosis of coronary artery bypass graft   (+) Benign essential hypertension   (+) Paroxysmal atrial fibrillation (Multi)   (+) Primary hypertension      Neuro   (+) Anxiety   (+) Depression with anxiety   (+) Major depressive disorder, recurrent, severe with psychotic symptoms (Multi)   (+) Seizure (Multi)      /Renal   (+) ESRD (end stage " renal disease) on dialysis (Multi)   (+) End-stage renal disease on hemodialysis (Multi)   (+) Hyponatremia      Hematology   (+) Anemia of chronic disease   (+) Anemia secondary to renal failure   (+) Iron deficiency anemia      ID   (+) MRSA (methicillin resistant Staphylococcus aureus) infection   (+) MRSA bacteremia   (+) Right foot infection   (+) Septic shock (Multi)       Active Ambulatory Problems     Diagnosis Date Noted    Abnormal renal function 11/09/2023    Anemia of chronic disease 11/09/2023    Anemia secondary to renal failure 11/09/2023    ESRD (end stage renal disease) on dialysis (Multi) 11/09/2023    Anxiety 11/09/2023    Arteriovenous fistula 11/09/2023    Asthenia 11/09/2023    Benign essential hypertension 11/09/2023    Presence of prosthetic heart valve 11/09/2023    Arteriosclerosis of coronary artery bypass graft 11/09/2023    Dependence on renal dialysis 11/09/2023    Depression with anxiety 11/09/2023    Electrolyte imbalance 11/09/2023    End-stage renal disease on hemodialysis (Multi) 11/09/2023    Endocarditis 11/09/2023    Primary hypertension 11/09/2023    Low blood pressure 11/09/2023    Pseudoaneurysm 11/09/2023    Hypocalcemia 11/09/2023    Hyponatremia 11/09/2023    Insomnia 11/09/2023    Iron deficiency anemia 11/09/2023    Major depressive disorder, recurrent, severe with psychotic symptoms (Multi) 11/09/2023    Melena 11/09/2023    MRSA (methicillin resistant Staphylococcus aureus) infection 11/09/2023    Paroxysmal atrial fibrillation (Multi) 11/09/2023    Pleural effusion 11/09/2023    Seizure (Multi) 11/09/2023    Sepsis (Multi) 11/09/2023    S/P aortic valve replacement 11/09/2023    S/P MVR (mitral valve replacement) 11/09/2023    MRSA bacteremia 01/15/2024    Sepsis, due to unspecified organism, unspecified whether acute organ dysfunction present (Multi) 04/28/2024    Abscess 08/18/2024    History of endocarditis 08/26/2024    Septic shock (Multi) 12/14/2024    Bleeding  "due to dialysis catheter placement 12/16/2024    Right foot infection 04/17/2025    Hyperkalemia 04/17/2025     Resolved Ambulatory Problems     Diagnosis Date Noted    Hyperkalemia 11/09/2023     Past Medical History:   Diagnosis Date    Aortic valve replaced 2022    CHF (congestive heart failure)     Chronic pain     Coronary artery disease     Disease of thyroid gland     ESRD (end stage renal disease) (Multi)     H/O mitral valve replacement 2013    Heart disease     History of transfusion     Hypertension     Mitral valve regurgitation     Seizures (Multi)     Stroke (Multi)        Clinical information reviewed:   Tobacco  Allergies  Meds   Med Hx  Surg Hx   Fam Hx  Soc Hx        NPO Detail:    No data recorded     Physical Exam    Airway  Mallampati: III  TM distance: >3 FB  Neck ROM: full  Mouth opening: 3 or more finger widths     Cardiovascular - normal exam   Dental     (+) upper dentures, lower dentures     Pulmonary - normal exam   Abdominal - normal exam           Anesthesia Plan    History of general anesthesia?: yes  History of complications of general anesthesia?: no    ASA 4     MAC   (Plan on establishing additional IV access during sedation for continued IV antibiotic therapy after the procedure)  The patient is not a current smoker.    intravenous induction   Anesthetic plan and risks discussed with patient.    Plan discussed with CAA.               [1]   Allergies  Allergen Reactions    Kayexalate Seizure    Metoclopramide Hcl Other     anxious, agitated, \"skin crawl    Prochlorperazine Other     anxious, agitated, \"skin crawl    Zofran [Ondansetron Hcl] Headache    Ondansetron Other and Headache   [2]   Past Surgical History:  Procedure Laterality Date    AORTIC VALVE REPLACEMENT  09/26/2022    bioprosthetic    CORONARY ARTERY BYPASS GRAFT      IR CVC  01/12/2024    exchange    IR CVC  05/07/2024    exchange    IR CVC  08/20/2024    removal    IR CVC TUNNELED  09/09/2022    IR CVC " TUNNELED 9/9/2022 [facility]    IR CVC TUNNELED  12/28/2022    IR CVC TUNNELED 12/28/2022 [facility]    MITRAL VALVE REPAIR  2013    MITRAL VALVE REPLACEMENT  09/26/2022    bioprosthetic    PARATHYROIDECTOMY      TRICUSPID VALVULOPLASTY  2013    US GUIDED PERCUTANEOUS PLACEMENT  07/14/2022    US GUIDED PERCUTANEOUS PLACEMENT [facility]

## 2025-06-10 NOTE — PROGRESS NOTES
"INFECTIOUS DISEASES PROGRESS NOTE    Consulted / following patient for:  Displaced and infected tunneled dialysis catheter    Subjective   Interval History:   No systemic symptoms, anxious to have the catheter removed later today     Objective   PHYSICAL EXAMINATION  Vital signs:  Visit Vitals  /74 (BP Location: Right arm, Patient Position: Lying)   Pulse 61   Temp 36.3 °C (97.3 °F) (Oral)   Resp 19      General: Resting comfortably, no acute distress  Lungs:  Clear to auscultation  Heart:  S1, S2 normal, crisp valve sounds, no pathologic murmur appreciated  Abdomen:  Soft, nontender. No palpable organs or masses  Extremities: No change in the appearance of the partially dislodged right femoral dialysis catheter.  Surrounding erythema diminishing    Relevant Results  WBC: 4800    Microbiology:  Blood (6/8): Negative X2  Right femoral wound (6/9): Staph aureus        ASSESSMENT:  Rule out dialysis catheter infection  Catheter has become partially dislodged and has surrounding erythema.  Blood cultures negative to date.  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  Does not appear septic.  Preliminary culture showing Staph aureus        PLANS:  -   Continue vancomycin pending CYNTHIA  -   Stop Zosyn  -   Anticipate removal of the dialysis catheter today  -   Continue \"vancomycin dwell\" after each dialysis       Adama Soto MD  [facility]  Office:  [phone]  "

## 2025-06-10 NOTE — POST-PROCEDURE NOTE
Report to Receiving RN:    Report To: Stephanie  Time Report Called: 1393  Hand-Off Communication: tx ended approx 1.5 hours early; pt refused further tx stating pain at catheter insertion site; Dr. LUCINA Pedersen; pt discharged stable back to nursing floor; primary nurse will inform IR about the patient's c/o pain at the catheter insertion site  Complications During Treatment: Yes, see note above  Ultrafiltration Treatment: Yes, 1.3 liters  Medications Administered During Dialysis: No  Blood Products Administered During Dialysis: No  Labs Sent During Dialysis: No  Heparin Drip Rate Changes: No  Dialysis Catheter Dressing: clean, dry and intact  Last Dressing Change: 6/10/25    Electronic Signatures:  Caroline Morales RN (Signed CE)   Authored: CE      Last Updated: 4:51 PM by CAROLINE MORALES

## 2025-06-10 NOTE — NURSING NOTE
Assumed care of pt. Awake, alert & pleasant. Right groin dressing cdi. Remains npo for procedure today. Call light within reach. Chg complete this am per night shift.

## 2025-06-10 NOTE — CARE PLAN
Problem: Pain - Adult  Goal: Verbalizes/displays adequate comfort level or baseline comfort level  Outcome: Progressing  Flowsheets (Taken 6/10/2025 1953)  Verbalizes/displays adequate comfort level or baseline comfort level:   Assess pain using appropriate pain scale   Encourage patient to monitor pain and request assistance   Administer analgesics based on type and severity of pain and evaluate response   Implement non-pharmacological measures as appropriate and evaluate response     Problem: Discharge Planning  Goal: Discharge to home or other facility with appropriate resources  Outcome: Progressing  Flowsheets (Taken 6/10/2025 1954)  Discharge to home or other facility with appropriate resources: Identify barriers to discharge with patient and caregiver     Problem: Chronic Conditions and Co-morbidities  Goal: Patient's chronic conditions and co-morbidity symptoms are monitored and maintained or improved  Outcome: Progressing  Flowsheets (Taken 6/10/2025 1953)  Care Plan - Patient's Chronic Conditions and Co-Morbidity Symptoms are Monitored and Maintained or Improved:   Collaborate with multidisciplinary team to address chronic and comorbid conditions and prevent exacerbation or deterioration   Monitor and assess patient's chronic conditions and comorbid symptoms for stability, deterioration, or improvement

## 2025-06-10 NOTE — PRE-PROCEDURE NOTE
Report from Sending RN:    Report From: Crystal Maurer  Recent Surgery of Procedure: Yes, femoral hd placed 6/10/25  Baseline Level of Consciousness (LOC): a/o x3  Oxygen Use: Yes, 4 liters  Type: nasal cannula  Diabetic: Yes, 88  Last BP Med Given Day of Dialysis: See MAR  Last Pain Med Given: See MAR  Lab Tests to be Obtained with Dialysis: No  Blood Transfusion to be Given During Dialysis: No  Available IV Access: Yes  Medications to be Administered During Dialysis: No  Continuous IV Infusion Running: Yes, LR at 100 ml/hr  Restraints on Currently or in the Last 24 Hours: No  Hand-Off Communication: full code; stable for hd tx;   Dialysis Catheter Dressing: tbd  Last Dressing Change: tbd

## 2025-06-10 NOTE — PROGRESS NOTES
Vancomycin Dosing by Pharmacy- FOLLOW-UP (HEMODIALYSIS)-tunneled dialysis catheter replaced    Batsheva Page is a 49 y.o. year old female who Pharmacy has been consulted for vancomycin dosing for cellulitis/skin and soft tissue infection. Based on the patient's indication and renal status this patient will be dosed based on a pre-HD level of 20-25 mcg/mL.     Patient is currently on hemodialysis User Schedule (TBD). Dialysis today 6/10    Current vancomycin regimen or maintenance dose: one time dose of 750 mg administered on 6/10 at 18:00     Vancomycin level 18.7 mcg/mL 6/10@3:41, was treating as DAKOTA but     Lab Results   Component Value Date    VANCORANDOM 18.7 06/10/2025    VANCOTROUGH 27.9 (HH) 09/26/2022       Visit Vitals  /90   Pulse 58   Temp 35.6 °C (96.1 °F) (Temporal)   Resp 14        Lab Results   Component Value Date    CREATININE 10.32 (H) 06/10/2025    CREATININE 10.02 (H) 06/09/2025    CREATININE 8.91 (H) 06/09/2025    CREATININE 7.60 (H) 06/08/2025       I/O last 3 completed shifts:  In: 830 (11.1 mL/kg) [P.O.:680; IV Piggyback:150]  Out: 0 (0 mL/kg)   Weight: 74.7 kg     Assessment/Plan     Level is 18.7 on 6/10@ 3:41, patient was DAKOTA now back to dialysis dosing.  Continue current regimen  Next pre-HD level will be obtained on 6/11 at 5:00. Incase schedule goes back to M/W/F. May be obtained sooner if clinically indicated.    Will continue to monitor renal function daily while on vancomycin and order serum creatinine at least every 48 hours if not already ordered.  Follow for continued vancomycin needs, clinical response, and signs/symptoms of toxicity.     Maurice García[facility]

## 2025-06-10 NOTE — PROGRESS NOTES
I attempted to see the patient today however she was not in the room, was taken down by interventional radiology for tunneled dialysis catheter exchange.  She is scheduled for dialysis to begin right after she comes back from IR.    Vu Pedersen MD

## 2025-06-10 NOTE — CARE PLAN
Problem: Pain - Adult  Goal: Verbalizes/displays adequate comfort level or baseline comfort level  Outcome: Progressing  Flowsheets (Taken 6/10/2025 1953)  Verbalizes/displays adequate comfort level or baseline comfort level:   Assess pain using appropriate pain scale   Encourage patient to monitor pain and request assistance   Administer analgesics based on type and severity of pain and evaluate response   Implement non-pharmacological measures as appropriate and evaluate response     Problem: Chronic Conditions and Co-morbidities  Goal: Patient's chronic conditions and co-morbidity symptoms are monitored and maintained or improved  Outcome: Progressing  Flowsheets (Taken 6/10/2025 1953)  Care Plan - Patient's Chronic Conditions and Co-Morbidity Symptoms are Monitored and Maintained or Improved:   Collaborate with multidisciplinary team to address chronic and comorbid conditions and prevent exacerbation or deterioration   Monitor and assess patient's chronic conditions and comorbid symptoms for stability, deterioration, or improvement

## 2025-06-10 NOTE — PROGRESS NOTES
Patient not medically clear. Patient going to IR today for dialysis catheter replacement. At the time of discharge, patient will return home with no skilled services. Will follow.      06/10/25 1101   Discharge Planning   Home or Post Acute Services None   Expected Discharge Disposition Home   Does the patient need discharge transport arranged? No

## 2025-06-10 NOTE — POST-PROCEDURE NOTE
Interventional Radiology Brief Postprocedure Note    Attending: Manuel Medina    Diagnosis: Dislodged tunneled dialysis catheter    Description of procedure: Exchange of right femoral tunneled dialysis catheter     Anesthesia:  MAC Deep    Complications: None    Estimated Blood Loss: minimal    Medications  As of 06/10/25 1333      vancomycin (Xellia) 1,000 mg in diluent combination  mL (mL/hr) Total volume:  Not documented* Dosing weight:  69.4   *Total volume has not been documented. View each administration to see the amount administered.     Date/Time Rate/Dose/Volume Action       06/08/25  1443 1,000 mg - 200 mL/hr (over 60 min) New Bag      1548  (over 60 min) Stopped               piperacillin-tazobactam (Zosyn) 2.25 g in dextrose (iso) IV 50 mL (g) Total dose:  9 g Dosing weight:  69.4      Date/Time Rate/Dose/Volume Action       06/08/25  1414 2.25 g (over 30 min) New Bag      1445  (over 30 min) Stopped      1613 *2.25 g (over 30 min) Missed      2104 2.25 g (over 30 min) New Bag      2134 50 mL Stopped     06/09/25  0530 2.25 g (over 30 min) New Bag      0600 50 mL Stopped      1342 2.25 g (over 30 min) New Bag      1424 50 mL Stopped      2237 2.25 g (over 30 min) New Bag     06/10/25  0036  (over 30 min) Stopped      0543 2.25 g (over 30 min) New Bag      0721  (over 30 min) Stopped               HYDROmorphone (Dilaudid) injection 0.5 mg (mg) Total dose:  0.5 mg Dosing weight:  69.4      Date/Time Rate/Dose/Volume Action       06/08/25  1414 0.5 mg Given               HYDROmorphone (Dilaudid) injection 0.2 mg (mg) Total dose:  1 mg* Dosing weight:  69.4   *Administration not included in total     Date/Time Rate/Dose/Volume Action       06/08/25  1700 0.2 mg Given      2102 0.2 mg Given     06/09/25  0004 0.2 mg Given      0256 0.2 mg Given      0528 0.2 mg Given      1001 *0.2 mg Missed               HYDROmorphone (Dilaudid) injection 0.4 mg (mg) Total dose:  2.4 mg Dosing weight:  69.4       Date/Time Rate/Dose/Volume Action       06/09/25  0939 0.4 mg Given      1342 0.4 mg Given      1741 0.4 mg Given      2225 0.4 mg Given     06/10/25  0318 0.4 mg Given      0753 0.4 mg Given               HYDROmorphone (Dilaudid) injection 0.4 mg (mg) Total dose:  0.4 mg Dosing weight:  74.7      Date/Time Rate/Dose/Volume Action       06/10/25  1057 0.4 mg Given               diphenhydrAMINE (Sominex) tablet 25 mg (mg) Total dose:  0 mg* Dosing weight:  69.4   *Administration not included in total     Date/Time Rate/Dose/Volume Action       06/08/25  1441 *25 mg Missed               cloNIDine (Catapres) tablet 0.3 mg (mg) Total volume:  Not documented*   *Total volume has not been documented. View each administration to see the amount administered.     Date/Time Rate/Dose/Volume Action       06/08/25  Canceled Entry      2101 0.3 mg Given     06/09/25  0542 0.3 mg Given      1342 0.3 mg Given      2227 0.3 mg Given     06/10/25  0541 0.3 mg Given               levETIRAcetam (Keppra) tablet 750 mg (mg) Total volume:  Not documented*   *Total volume has not been documented. View each administration to see the amount administered.     Date/Time Rate/Dose/Volume Action       06/08/25  2100 *750 mg Missed     06/10/25  0037 *750 mg Missed               carvedilol (Coreg) tablet 12.5 mg (mg) Total dose:  37.5 mg* Dosing weight:  69.4   *Administration not included in total     Date/Time Rate/Dose/Volume Action       06/08/25  2100 12.5 mg Given     06/09/25  0840 12.5 mg Given      2228 12.5 mg Given     06/10/25  0801 *12.5 mg Missed               calcitriol (Rocaltrol) capsule 0.5 mcg (mcg) Total dose:  0.5 mcg*   *Administration not included in total     Date/Time Rate/Dose/Volume Action       06/09/25  0840 0.5 mcg Given     06/10/25  0801 *0.5 mcg Missed               atorvastatin (Lipitor) tablet 40 mg (mg) Total dose:  40 mg*   *Administration not included in total     Date/Time Rate/Dose/Volume Action        06/08/25  2100 *40 mg Missed     06/09/25  2227 40 mg Given               aspirin EC tablet 81 mg (mg) Total dose:  Cannot be calculated*   *Administration dose not documented     Date/Time Rate/Dose/Volume Action       06/08/25  Canceled Entry               aspirin EC tablet 81 mg (mg) Total dose:  81 mg*   *Administration not included in total     Date/Time Rate/Dose/Volume Action       06/09/25  0840 81 mg Given     06/10/25  0801 *81 mg Missed               ergocalciferol (Vitamin D-2) capsule 1.25 mg (mg) Total dose:  0 mg*   *Administration not included in total     Date/Time Rate/Dose/Volume Action       06/08/25  1614 *1.25 mg Missed               hydrOXYzine HCL (Atarax) tablet 25 mg (mg) Total dose:  25 mg Dosing weight:  69.4      Date/Time Rate/Dose/Volume Action       06/08/25  2100 25 mg Given               oxyCODONE-acetaminophen (Percocet) 5-325 mg per tablet 1 tablet (tablet) Total dose:  Cannot be calculated* Dosing weight:  69.4   *Administration dose not documented     Date/Time Rate/Dose/Volume Action       06/08/25  Canceled Entry               pregabalin (Lyrica) capsule 75 mg (mg) Total dose:  0 mg*   *Administration not included in total     Date/Time Rate/Dose/Volume Action       06/09/25  0837 *75 mg Missed     06/10/25  0801 *75 mg Missed               diphenhydrAMINE (BENADryl) injection 25 mg (mg) Total dose:  75 mg Dosing weight:  69.4      Date/Time Rate/Dose/Volume Action       06/08/25  1536 25 mg Given     06/09/25  0004 25 mg Given      0527 25 mg Given               diphenhydrAMINE (BENADryl) injection 50 mg (mg) Total dose:  250 mg Dosing weight:  69.4      Date/Time Rate/Dose/Volume Action       06/09/25  1145 50 mg Given      1903 50 mg Given      2226 50 mg Given     06/10/25  0318 50 mg Given      0754 50 mg Given               pantoprazole (ProtoNix) EC tablet 40 mg (mg) Total dose:  0 mg* Dosing weight:  69.4   *Administration not included in total     Date/Time  Rate/Dose/Volume Action       06/09/25  0601 *40 mg Missed     06/10/25  0721 *40 mg Missed               heparin (porcine) injection 5,000 Units (Units) Total dose:  0 Units* Dosing weight:  69.4   *Administration not included in total     Date/Time Rate/Dose/Volume Action       06/08/25  2242 *5,000 Units Missed     06/09/25  0546 *5,000 Units Missed      1725 *5,000 Units Missed     06/10/25  0548 *5,000 Units Missed               hydrALAZINE (Apresoline) injection 10 mg (mg) Total dose:  10 mg Dosing weight:  69.4      Date/Time Rate/Dose/Volume Action       06/08/25  1718 10 mg Given               sodium zirconium cyclosilicate (Lokelma) packet 10 g (g) Total dose:  0 g* Dosing weight:  69.4   *Administration not included in total     Date/Time Rate/Dose/Volume Action       06/09/25  0836 *10 g Missed      1603 *10 g Missed     06/10/25  0035 *10 g Missed      0801 *10 g Missed               dextrose 50 % injection 25 g (g) Total dose:  25 g Dosing weight:  69.4      Date/Time Rate/Dose/Volume Action       06/09/25  0939 25 g Given               dextrose 50 % injection 25 g (g) Total dose:  0 g* Dosing weight:  74.7   *Administration not included in total     Date/Time Rate/Dose/Volume Action       06/10/25  0918 *25 g (over 5 min) Missed               insulin regular (HumuLIN, NovoLIN) bolus from bag 10 Units (Units) Total dose:  Cannot be calculated* Dosing weight:  69.4   *Administration dose not documented     Date/Time Rate/Dose/Volume Action       06/09/25  1001  Stopped      1021  Stopped      Canceled Entry               insulin regular (HumuLIN R,NovoLIN R) injection 10 Units (Units) Total dose:  10 Units Dosing weight:  69.4      Date/Time Rate/Dose/Volume Action       06/09/25  0942 10 Units Given               insulin regular (HumuLIN R,NovoLIN R) injection 10 Units (Units) Total dose:  0 Units* Dosing weight:  74.7   *Administration not included in total     Date/Time Rate/Dose/Volume Action        06/10/25  0918 *10 Units (over 1 min) Missed               epoetin brandy-epbx (Retacrit) injection 6,000 Units (Units) Total dose:  6,000 Units Dosing weight:  69.4      Date/Time Rate/Dose/Volume Action       06/09/25  1725 6,000 Units Given               sodium bicarbonate 8.4 % (1 mEq/mL) 50 mEq (mEq) Total dose:  50 mEq Dosing weight:  74.7      Date/Time Rate/Dose/Volume Action       06/10/25  0922 50 mEq Given               sodium chloride 0.9% infusion (mL/hr) Total volume:  16 mL* Dosing weight:  69   *From user-documented volume     Date/Time Rate/Dose/Volume Action       06/10/25  1140 20 mL/hr New Bag      1228 16 mL                oxygen (O2) therapy (L/min) Total volume:  Not documented*   *Total volume has not been documented. View each administration to see the amount administered.     Date/Time Rate/Dose/Volume Action       06/10/25  1231 4 L/min - 240,000 mL/hr Given               lidocaine PF (Xylocaine) 10 mg/mL (1 %) injection (mL) Total volume:  5 mL      Date/Time Rate/Dose/Volume Action       06/10/25  1248 5 mL Given                   No specimens collected      See detailed result report with images in PACS.    The patient tolerated the procedure well without incident or complication and is in stable condition.

## 2025-06-10 NOTE — ANESTHESIA POSTPROCEDURE EVALUATION
Patient: Batsheva Page    Procedure Summary       Date: 06/10/25 Room / Location: [facility]    Anesthesia Start: 1218 Anesthesia Stop: 1308    Procedure: IR CVC TUNNELED Diagnosis: (Dislodged HD line)    Scheduled Providers: Melvin Medina MD; DANIELLA Ritter; Chava Fernández MD Responsible Provider: Chava Fernández MD    Anesthesia Type: MAC ASA Status: 4            Anesthesia Type: MAC    Vitals Value Taken Time   /90 06/10/25 14:02   Temp 36.5 °C (97.7 °F) 06/10/25 13:10   Pulse 112 06/10/25 14:06   Resp 12 06/10/25 14:06   SpO2 95 % 06/10/25 14:06   Vitals shown include unfiled device data.    Anesthesia Post Evaluation    Patient location during evaluation: bedside  Patient participation: complete - patient participated  Level of consciousness: awake  Pain score: 3  Pain management: adequate  Multimodal analgesia pain management approach  Airway patency: patent  Two or more strategies used to mitigate risk of obstructive sleep apnea  Cardiovascular status: acceptable  Respiratory status: acceptable  Hydration status: acceptable  Postoperative Nausea and Vomiting: none        No notable events documented.

## 2025-06-10 NOTE — CARE PLAN
Problem: Pain - Adult  Goal: Verbalizes/displays adequate comfort level or baseline comfort level  Outcome: Progressing     Problem: Safety - Adult  Goal: Free from fall injury  Outcome: Progressing     Problem: Discharge Planning  Goal: Discharge to home or other facility with appropriate resources  Outcome: Progressing     Problem: Chronic Conditions and Co-morbidities  Goal: Patient's chronic conditions and co-morbidity symptoms are monitored and maintained or improved  Outcome: Progressing     Problem: Nutrition  Goal: Nutrient intake appropriate for maintaining nutritional needs  Outcome: Progressing     Problem: Pain  Goal: Takes deep breaths with improved pain control throughout the shift  Outcome: Progressing  Goal: Turns in bed with improved pain control throughout the shift  Outcome: Progressing  Goal: Walks with improved pain control throughout the shift  Outcome: Progressing  Goal: Performs ADL's with improved pain control throughout shift  Outcome: Progressing  Goal: Participates in PT with improved pain control throughout the shift  Outcome: Progressing  Goal: Free from opioid side effects throughout the shift  Outcome: Progressing  Goal: Free from acute confusion related to pain meds throughout the shift  Outcome: Progressing     Problem: Skin  Goal: Participates in plan/prevention/treatment measures  Outcome: Progressing  Goal: Prevent/manage excess moisture  Outcome: Progressing  Goal: Promote skin healing  Outcome: Progressing   The patient's goals for the shift include pain control    The clinical goals for the shift include new cath for dialysis    Over the shift, the patient did not make progress toward the following goals. Barriers to progression include Pain management. Recommendations to address these barriers include IR HD CATH Placement in AM.

## 2025-06-10 NOTE — CARE PLAN
Problem: Safety - Medical Restraint  Goal: Remains free of injury from restraints (Restraint for Interference with Medical Device)  Outcome: Met

## 2025-06-10 NOTE — PRE-PROCEDURE NOTE
Interventional Radiology Preprocedure Note    Indication for procedure: The encounter diagnosis was Complication associated with dialysis catheter.    Relevant review of systems: NA    Relevant Labs:   Lab Results   Component Value Date    CREATININE 10.32 (H) 06/10/2025    EGFR 4 (L) 06/10/2025    INR 1.1 04/21/2025    PROTIME 11.7 04/21/2025       Planned Sedation/Anesthesia: Deep    Airway assessment: normal    Directed physical examination:    GENERAL: awake, no acute distress  HEENT: AT/NC  NECK: supple  CV: RRR  PULM: non-labored breathing  ABDOMEN: soft, ND  NEURO: AAOx3  MSK: full ROM  SKIN: no rash      Mallampati: III (soft and hard palate and base of uvula visible)    ASA Score: ASA 3 - Patient with moderate systemic disease with functional limitations    Benefits, risks and alternatives of procedure and planned sedation have been discussed with the patient and/or their representative. All questions answered and they agree to proceed.

## 2025-06-10 NOTE — CARE PLAN
The patient's goals for the shift include IR - new cath placement,     The clinical goals for the shift include dialysis- normalize labs

## 2025-06-11 LAB
ALBUMIN SERPL BCP-MCNC: 3.7 G/DL (ref 3.4–5)
ANION GAP SERPL CALCULATED.3IONS-SCNC: 19 MMOL/L (ref 10–20)
BACTERIA SPEC CULT: ABNORMAL
BUN SERPL-MCNC: 38 MG/DL (ref 6–23)
CALCIUM SERPL-MCNC: 7.8 MG/DL (ref 8.6–10.3)
CHLORIDE SERPL-SCNC: 97 MMOL/L (ref 98–107)
CO2 SERPL-SCNC: 25 MMOL/L (ref 21–32)
CREAT SERPL-MCNC: 7.19 MG/DL (ref 0.5–1.05)
EGFRCR SERPLBLD CKD-EPI 2021: 6 ML/MIN/1.73M*2
ERYTHROCYTE [DISTWIDTH] IN BLOOD BY AUTOMATED COUNT: 16.2 % (ref 11.5–14.5)
GLUCOSE BLD MANUAL STRIP-MCNC: 110 MG/DL (ref 74–99)
GLUCOSE SERPL-MCNC: 97 MG/DL (ref 74–99)
GRAM STN SPEC: ABNORMAL
GRAM STN SPEC: ABNORMAL
HCT VFR BLD AUTO: 29.8 % (ref 36–46)
HGB BLD-MCNC: 9.2 G/DL (ref 12–16)
MCH RBC QN AUTO: 31.3 PG (ref 26–34)
MCHC RBC AUTO-ENTMCNC: 30.9 G/DL (ref 32–36)
MCV RBC AUTO: 101 FL (ref 80–100)
NRBC BLD-RTO: 0 /100 WBCS (ref 0–0)
PHOSPHATE SERPL-MCNC: 6.2 MG/DL (ref 2.5–4.9)
PLATELET # BLD AUTO: 125 X10*3/UL (ref 150–450)
POTASSIUM SERPL-SCNC: 4.9 MMOL/L (ref 3.5–5.3)
RBC # BLD AUTO: 2.94 X10*6/UL (ref 4–5.2)
SODIUM SERPL-SCNC: 136 MMOL/L (ref 136–145)
VANCOMYCIN SERPL-MCNC: 30.9 UG/ML (ref 5–20)
WBC # BLD AUTO: 3.8 X10*3/UL (ref 4.4–11.3)

## 2025-06-11 PROCEDURE — 2500000001 HC RX 250 WO HCPCS SELF ADMINISTERED DRUGS (ALT 637 FOR MEDICARE OP): Performed by: NURSE PRACTITIONER

## 2025-06-11 PROCEDURE — 2500000004 HC RX 250 GENERAL PHARMACY W/ HCPCS (ALT 636 FOR OP/ED): Performed by: NURSE PRACTITIONER

## 2025-06-11 PROCEDURE — 80069 RENAL FUNCTION PANEL: CPT | Performed by: INTERNAL MEDICINE

## 2025-06-11 PROCEDURE — 1200000002 HC GENERAL ROOM WITH TELEMETRY DAILY

## 2025-06-11 PROCEDURE — 2500000001 HC RX 250 WO HCPCS SELF ADMINISTERED DRUGS (ALT 637 FOR MEDICARE OP): Performed by: INTERNAL MEDICINE

## 2025-06-11 PROCEDURE — 99232 SBSQ HOSP IP/OBS MODERATE 35: CPT | Performed by: NURSE PRACTITIONER

## 2025-06-11 PROCEDURE — 85027 COMPLETE CBC AUTOMATED: CPT | Performed by: INTERNAL MEDICINE

## 2025-06-11 PROCEDURE — 2500000002 HC RX 250 W HCPCS SELF ADMINISTERED DRUGS (ALT 637 FOR MEDICARE OP, ALT 636 FOR OP/ED): Performed by: INTERNAL MEDICINE

## 2025-06-11 PROCEDURE — 80202 ASSAY OF VANCOMYCIN: CPT | Performed by: NURSE PRACTITIONER

## 2025-06-11 PROCEDURE — 82947 ASSAY GLUCOSE BLOOD QUANT: CPT

## 2025-06-11 PROCEDURE — 36415 COLL VENOUS BLD VENIPUNCTURE: CPT | Performed by: INTERNAL MEDICINE

## 2025-06-11 RX ORDER — MUPIROCIN 20 MG/G
OINTMENT TOPICAL 2 TIMES DAILY
Status: CANCELLED | OUTPATIENT
Start: 2025-06-11 | End: 2025-06-16

## 2025-06-11 RX ORDER — HEPARIN SODIUM 1000 [USP'U]/ML
1000 INJECTION, SOLUTION INTRAVENOUS; SUBCUTANEOUS
Status: CANCELLED | OUTPATIENT
Start: 2025-06-12

## 2025-06-11 RX ORDER — SULFAMETHOXAZOLE AND TRIMETHOPRIM 400; 80 MG/1; MG/1
1 TABLET ORAL 2 TIMES DAILY
Status: DISCONTINUED | OUTPATIENT
Start: 2025-06-11 | End: 2025-06-19 | Stop reason: HOSPADM

## 2025-06-11 RX ORDER — HEPARIN SODIUM 1000 [USP'U]/ML
1000 INJECTION, SOLUTION INTRAVENOUS; SUBCUTANEOUS
Status: DISCONTINUED | OUTPATIENT
Start: 2025-06-12 | End: 2025-06-11

## 2025-06-11 RX ADMIN — HYDROMORPHONE HYDROCHLORIDE 0.4 MG: 1 INJECTION, SOLUTION INTRAMUSCULAR; INTRAVENOUS; SUBCUTANEOUS at 15:23

## 2025-06-11 RX ADMIN — CARVEDILOL 12.5 MG: 12.5 TABLET, FILM COATED ORAL at 10:13

## 2025-06-11 RX ADMIN — HYDROMORPHONE HYDROCHLORIDE 0.4 MG: 1 INJECTION, SOLUTION INTRAMUSCULAR; INTRAVENOUS; SUBCUTANEOUS at 09:14

## 2025-06-11 RX ADMIN — PREGABALIN 75 MG: 75 CAPSULE ORAL at 10:13

## 2025-06-11 RX ADMIN — DIPHENHYDRAMINE HYDROCHLORIDE 50 MG: 50 INJECTION, SOLUTION INTRAMUSCULAR; INTRAVENOUS at 12:21

## 2025-06-11 RX ADMIN — CALCITRIOL CAPSULES 0.25 MCG 0.5 MCG: 0.25 CAPSULE ORAL at 10:13

## 2025-06-11 RX ADMIN — DIPHENHYDRAMINE HYDROCHLORIDE 50 MG: 50 INJECTION, SOLUTION INTRAMUSCULAR; INTRAVENOUS at 18:38

## 2025-06-11 RX ADMIN — DIPHENHYDRAMINE HYDROCHLORIDE 50 MG: 50 INJECTION, SOLUTION INTRAMUSCULAR; INTRAVENOUS at 00:03

## 2025-06-11 RX ADMIN — HYDROMORPHONE HYDROCHLORIDE 0.4 MG: 1 INJECTION, SOLUTION INTRAMUSCULAR; INTRAVENOUS; SUBCUTANEOUS at 12:21

## 2025-06-11 RX ADMIN — DIPHENHYDRAMINE HYDROCHLORIDE 50 MG: 50 INJECTION, SOLUTION INTRAMUSCULAR; INTRAVENOUS at 21:51

## 2025-06-11 RX ADMIN — ASPIRIN 81 MG: 81 TABLET, COATED ORAL at 10:13

## 2025-06-11 RX ADMIN — HYDROMORPHONE HYDROCHLORIDE 0.4 MG: 1 INJECTION, SOLUTION INTRAMUSCULAR; INTRAVENOUS; SUBCUTANEOUS at 21:51

## 2025-06-11 RX ADMIN — DIPHENHYDRAMINE HYDROCHLORIDE 50 MG: 50 INJECTION, SOLUTION INTRAMUSCULAR; INTRAVENOUS at 09:14

## 2025-06-11 RX ADMIN — CLONIDINE HYDROCHLORIDE 0.3 MG: 0.2 TABLET ORAL at 14:10

## 2025-06-11 RX ADMIN — HYDROMORPHONE HYDROCHLORIDE 0.4 MG: 1 INJECTION, SOLUTION INTRAMUSCULAR; INTRAVENOUS; SUBCUTANEOUS at 05:54

## 2025-06-11 RX ADMIN — DIPHENHYDRAMINE HYDROCHLORIDE 50 MG: 50 INJECTION, SOLUTION INTRAMUSCULAR; INTRAVENOUS at 15:23

## 2025-06-11 RX ADMIN — HYDROMORPHONE HYDROCHLORIDE 0.4 MG: 1 INJECTION, SOLUTION INTRAMUSCULAR; INTRAVENOUS; SUBCUTANEOUS at 18:38

## 2025-06-11 RX ADMIN — SALINE NASAL SPRAY 1 SPRAY: 1.5 SOLUTION NASAL at 15:23

## 2025-06-11 RX ADMIN — HEPARIN SODIUM 5000 UNITS: 5000 INJECTION, SOLUTION INTRAVENOUS; SUBCUTANEOUS at 05:44

## 2025-06-11 RX ADMIN — DIPHENHYDRAMINE HYDROCHLORIDE 50 MG: 50 INJECTION, SOLUTION INTRAMUSCULAR; INTRAVENOUS at 05:54

## 2025-06-11 RX ADMIN — HYDROMORPHONE HYDROCHLORIDE 0.4 MG: 1 INJECTION, SOLUTION INTRAMUSCULAR; INTRAVENOUS; SUBCUTANEOUS at 00:03

## 2025-06-11 RX ADMIN — SULFAMETHOXAZOLE AND TRIMETHOPRIM 1 TABLET: 400; 80 TABLET ORAL at 22:02

## 2025-06-11 RX ADMIN — SULFAMETHOXAZOLE AND TRIMETHOPRIM 1 TABLET: 400; 80 TABLET ORAL at 15:43

## 2025-06-11 RX ADMIN — CARVEDILOL 12.5 MG: 12.5 TABLET, FILM COATED ORAL at 21:59

## 2025-06-11 RX ADMIN — CLONIDINE HYDROCHLORIDE 0.3 MG: 0.2 TABLET ORAL at 05:43

## 2025-06-11 RX ADMIN — LEVETIRACETAM 750 MG: 250 TABLET, FILM COATED ORAL at 21:59

## 2025-06-11 RX ADMIN — ATORVASTATIN CALCIUM 40 MG: 40 TABLET, FILM COATED ORAL at 21:59

## 2025-06-11 RX ADMIN — CLONIDINE HYDROCHLORIDE 0.3 MG: 0.2 TABLET ORAL at 21:59

## 2025-06-11 ASSESSMENT — PAIN DESCRIPTION - LOCATION
LOCATION: GROIN

## 2025-06-11 ASSESSMENT — PAIN - FUNCTIONAL ASSESSMENT
PAIN_FUNCTIONAL_ASSESSMENT: 0-10

## 2025-06-11 ASSESSMENT — COGNITIVE AND FUNCTIONAL STATUS - GENERAL
MOBILITY SCORE: 24
DAILY ACTIVITIY SCORE: 24

## 2025-06-11 ASSESSMENT — PAIN SCALES - GENERAL
PAINLEVEL_OUTOF10: 7
PAINLEVEL_OUTOF10: 10 - WORST POSSIBLE PAIN
PAINLEVEL_OUTOF10: 10 - WORST POSSIBLE PAIN
PAINLEVEL_OUTOF10: 8
PAINLEVEL_OUTOF10: 7
PAINLEVEL_OUTOF10: 7
PAINLEVEL_OUTOF10: 10 - WORST POSSIBLE PAIN
PAINLEVEL_OUTOF10: 10 - WORST POSSIBLE PAIN
PAINLEVEL_OUTOF10: 7
PAINLEVEL_OUTOF10: 10 - WORST POSSIBLE PAIN
PAINLEVEL_OUTOF10: 7
PAINLEVEL_OUTOF10: 10 - WORST POSSIBLE PAIN

## 2025-06-11 ASSESSMENT — PAIN DESCRIPTION - ORIENTATION
ORIENTATION: RIGHT

## 2025-06-11 ASSESSMENT — PAIN DESCRIPTION - DESCRIPTORS
DESCRIPTORS: ACHING
DESCRIPTORS: ACHING;DISCOMFORT
DESCRIPTORS: ACHING;DISCOMFORT;SHARP
DESCRIPTORS: ACHING
DESCRIPTORS: SHARP;DISCOMFORT
DESCRIPTORS: DISCOMFORT;SHARP

## 2025-06-11 NOTE — PROGRESS NOTES
Anticipate discharge soon. ID to sign off. Patient will return home with no skilled services. Will follow.      06/11/25 1038   Discharge Planning   Home or Post Acute Services None   Expected Discharge Disposition Home   Does the patient need discharge transport arranged? No

## 2025-06-11 NOTE — PROGRESS NOTES
Vancomycin Dosing by Pharmacy- FOLLOW-UP (HEMODIALYSIS)    Batsheva Page is a 49 y.o. year old female who Pharmacy has been consulted for vancomycin dosing for line infection. Based on the patient's indication and renal status this patient will be dosed based on a pre-HD level of 20-25 mcg/mL.     Patient is currently on hemodialysis User Schedule (TBD).    Current vancomycin regimen or maintenance dose: 750 mg after each dialysis session      Lab Results   Component Value Date    VANCORANDOM 30.9 (H) 06/11/2025    VANCOTROUGH 27.9 (HH) 09/26/2022       Visit Vitals  /83 (BP Location: Right arm, Patient Position: Sitting)   Pulse 69   Temp 36.6 °C (97.9 °F) (Oral)   Resp 16        Lab Results   Component Value Date    CREATININE 7.19 (H) 06/11/2025    CREATININE 10.32 (H) 06/10/2025    CREATININE 10.02 (H) 06/09/2025    CREATININE 8.91 (H) 06/09/2025       I/O last 3 completed shifts:  In: 1748 (25.3 mL/kg) [P.O.:1080; I.V.:18 (0.3 mL/kg); Other:400; IV Piggyback:250]  Out: 3028 (43.9 mL/kg) [Other:3016; Blood:12]  Weight: 69 kg     Assessment/Plan     Continue current regimen  Next pre-HD level will be obtained on 6/12 at AM labs. May be obtained sooner if clinically indicated.    Will continue to monitor renal function daily while on vancomycin and order serum creatinine at least every 48 hours if not already ordered.  Follow for continued vancomycin needs, clinical response, and signs/symptoms of toxicity.     Radha Fallon, PharmD

## 2025-06-11 NOTE — PROGRESS NOTES
Batsheva Page is a 49 y.o. female on day 3 of admission presenting with Complication associated with dialysis catheter.      Subjective   Pt seen and examined at bedside. Awake/alert/oriented.  Denies chest pain, shortness of breath, fevers, chills, nausea, or vomiting.  She does report severe pain to hemodialysis site.  States that she could not tolerate her full dialysis treatment yesterday.  Also complaining of epistaxis.  No spray ordered and interventional radiologist reconsulted to assess dialysis catheter site.       Objective     Last Recorded Vitals  /78 (BP Location: Right arm, Patient Position: Sitting)   Pulse 66   Temp 36.6 °C (97.9 °F) (Oral)   Resp 16   Wt 69 kg (152 lb 1.9 oz)   SpO2 99%   Intake/Output last 3 Shifts:    Intake/Output Summary (Last 24 hours) at 6/11/2025 1413  Last data filed at 6/11/2025 0600  Gross per 24 hour   Intake 1632 ml   Output 3016 ml   Net -1384 ml       Admission Weight  Weight: 69.4 kg (153 lb) (06/08/25 1152)    Daily Weight  06/10/25 : 69 kg (152 lb 1.9 oz)    Image Results  IR CVC tunneled  Narrative: Interpreted By:  Melvin Medina,   STUDY:  IR CVC TUNNELED;  6/10/2025 1:08 pm      INDICATION:  Signs/Symptoms:Dislodged HD line.      COMPARISON:  None.      ACCESSION NUMBER(S):  [id number]      ORDERING CLINICIAN:  HALLEY CHAMORRO      TECHNIQUE:  INTERVENTIONALIST(S):  Manuel Medina MD      CONSENT:  The patient/patient's POA/next of kin was informed of the nature of  the proposed procedure. The purposes, alternatives, risks, and  benefits were explained and discussed. All questions were answered  and consent was obtained.      RADIATION EXPOSURE:  Fluoroscopy time: 0.7 min  Dose: 3.72 mGy  Dose Area Product (DAP): 1342 mGy*cm^2      SEDATION:  Mac anesthesia was provided by the department of anesthesiology in  maintained throughout the duration of the procedure.      MEDICATION:  None.      TIME OUT:  A time out was performed immediately  prior to procedure start with  the interventional team, correctly identifying the patient name, date  of birth, MRN, procedure, anatomy (including marking of site and  side), patient position, procedure consent form, relevant laboratory  and imaging test results, antibiotic administration, safety  precautions, and procedure-specific equipment needs.      COMPLICATIONS:  No immediate adverse events identified.      FINDINGS:  In the recumbent position, the patient was positioned on the  angiography table. MAC anesthesia was initiated by the Department of  Anesthesiology and maintained throughout the duration of the  procedure. The right inguinal cutaneous tissues and existing catheter  were prepared and draped in usual sterile manner. An 035 Amplatz wire  was placed through 1 of the lumens of the existing catheter. The  existing catheter was removed.  After Lidocaine 1% local anesthesia,  a 14.5 Turkmen x 44 cm hemodialysis catheter was then placed with  tract continuity and its central catheter tip(s) to reside at the  cavoatrial junction. A fluoroscopic spot image of the chest was  acquired in the AP projection to confirm optimal location. The  catheter ports were aspirated and flushed without resistance with  normal saline. The catheter ports were then charged with  high-concentration heparin. The external portions of the catheter  were secured with a purse-string 2-0 polypropylene suture and sterile  dressings.      The patient tolerated the procedure without complication.      Impression: 1. Uncomplicated exchange of a tunneled right femoral 14.5 Fr x 44 cm  hemodialysis catheter. The catheter is ready for immediate use.      Performed and dictated at [facility].      MACRO:  None.      Signed by: Melvin Medina 6/10/2025 4:02 PM  Dictation workstation:   [id number]      Physical Exam  Vitals and nursing note reviewed.   Constitutional:       General: She is not in acute  distress.     Appearance: Normal appearance. She is not toxic-appearing.   HENT:      Head: Normocephalic and atraumatic.      Nose: Nose normal.      Mouth/Throat:      Mouth: Mucous membranes are moist.   Eyes:      General: Lids are normal.      Extraocular Movements: Extraocular movements intact.      Conjunctiva/sclera: Conjunctivae normal.   Cardiovascular:      Rate and Rhythm: Normal rate and regular rhythm.   Pulmonary:      Effort: Pulmonary effort is normal.      Breath sounds: Normal breath sounds. No wheezing, rhonchi or rales.   Abdominal:      General: Bowel sounds are normal.      Palpations: Abdomen is soft.      Tenderness: There is no abdominal tenderness.   Musculoskeletal:         General: Normal range of motion.      Cervical back: Normal range of motion and neck supple.   Skin:     General: Skin is warm and dry.      Capillary Refill: Capillary refill takes less than 2 seconds.      Findings: Erythema present.   Neurological:      General: No focal deficit present.      Mental Status: She is alert and oriented to person, place, and time.       Relevant Results  Lab Results   Component Value Date    GLUCOSE 97 06/11/2025    CALCIUM 7.8 (L) 06/11/2025     06/11/2025    K 4.9 06/11/2025    CO2 25 06/11/2025    CL 97 (L) 06/11/2025    BUN 38 (H) 06/11/2025    CREATININE 7.19 (H) 06/11/2025      Lab Results   Component Value Date    WBC 3.8 (L) 06/11/2025    HGB 9.2 (L) 06/11/2025    HCT 29.8 (L) 06/11/2025     (H) 06/11/2025     (L) 06/11/2025        CBC, BMP, Vital signs reviewed    Assessment and Plan    Infected hemodialysis catheter   per patient HD line sliding noted erythema and purulent drainage  -blood cultures are in progress  - Stop Zosyn per ID  -Continue vancomycin pending CYNTHIA per ID  - wound culture shows +4 staphylococcus aures  - ID consultation following, appreciate recs.  Plan for Bactrim and vancomycin as well.  - Status post hemodialysis catheter exchange on  6/10/2025.  Now complaining of severe pain to new hemodialysis catheter site.  Interventional radiologist reconsulted.  Appreciate recommendations.  - Hx of MSSA/MRSA bacteremia       ESRD on hemodialysis  hyperkalemia resolved  - HD-> M/W/F   - nephrology consult placed  - Patient unable to complete full dialysis on 6/10/2025  - Hemodialysis per nephrology     HTN  - Continue antihypertensives  - Monitor blood pressure close     Anemia  -Likely secondary to CKD vs ABNER  -Check iron panel-reviewed, occult stool  - Hemoglobin 9.2-stable  -PPI     Seizure  - resumed home medication  - seizure precaution     CAD  - ASA and statin  - no chest pain currently     Anxiety/depression  - resumed home medications      GI prophylaxis  -PPI     DVT prophylaxis  - subcutaneous heparin     Epistaxis  - Nasal spray  - Monitor H&H  - ENT consult if needed     Plan  Monitor on telemetry  Monitor fluid/electrolytes close  Reconsult interventional radiologist to assess new hemodialysis catheter site  Hemodialysis per nephrology  Continue antibiotics, infectious disease following.  Recommendation for Bactrim and vancomycin as well  DVT prophylaxis  CBC and BMP in AM     Noelle Doss, APRN-CNP

## 2025-06-11 NOTE — NURSING NOTE
Pt has a R femoral dialysis catheter, initial drsg on when placed yesterday 6/10 silver drsg noted, site without any redness, swelling or drainage.

## 2025-06-11 NOTE — CARE PLAN
The patient's goals for the shift include Rest well & safety    The clinical goals for the shift include Patient will have adequate pain control.

## 2025-06-11 NOTE — PROGRESS NOTES
"INFECTIOUS DISEASES PROGRESS NOTE    Consulted / following patient for:  Displaced and infected tunneled dialysis catheter    Subjective   Interval History:   No systemic symptoms  Complains of persistent pain at the site of the replaced right femoral dialysis catheter    Objective   PHYSICAL EXAMINATION  Vital signs:  Visit Vitals  /78 (BP Location: Right arm, Patient Position: Sitting)   Pulse 66   Temp 36.6 °C (97.9 °F) (Oral)   Resp 16      General: Resting comfortably, no acute distress  Lungs:  Clear to auscultation  Heart:  S1, S2 normal, crisp valve sounds, no pathologic murmur appreciated  Abdomen:  Soft, nontender. No palpable organs or masses  Extremities: New tunneled right femoral dialysis catheter in place.  Tender.  Surrounding erythema resolved    Relevant Results  WBC: 3800    Microbiology:  Blood (6/8): Negative X2  Right femoral wound (6/9): MRSA, susceptible to Bactrim        ASSESSMENT:  Rule out dialysis catheter infection  Catheter became partially dislodged and developed surrounding erythema.  Blood cultures negative to date.  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  Does not appear septic.  Wound culture growing MRSA, as expected.  Catheter has been exchanged but she is complaining of significant pain --awaiting reassessment by IR        PLANS:  -   If okay with Dr. Pedersen, will change intravenous vancomycin to oral TMP-SMX for 7 days  -   Await reassessment of dialysis catheter by IR physician  -   Continue \"vancomycin dwell\" after each dialysis, will ask the assistance of Dr. Pedersen    Secure chat message sent to Dr. Pedersen: He responded and has ordered the \"vancomycin dwell\" and approved transition to oral TMP-SMX    Adama Soto MD  [facility]  Office:  [phone]  "

## 2025-06-11 NOTE — PROGRESS NOTES
"Batsheva Page is a 49 y.o. female on day 3 of admission presenting with Complication associated with dialysis catheter.      Subjective   Patient came off dialysis about an hour and a half early yesterday due to significant pain in the femoral dialysis catheter site after it was exchanged, which I asked that interventional radiology be notified about.  She also reports nosebleeds otherwise denies any symptoms.  Patient's nurse was by the bedside who was notified about these issues.       Objective          Vitals 24HR  Heart Rate:  []   Temp:  [35.6 °C (96.1 °F)-36.8 °C (98.2 °F)]   Resp:  [9-20]   BP: ()/(62-99)   Height:  [165.1 cm (5' 5\")]   Weight:  [69 kg (152 lb 1.9 oz)]   SpO2:  [94 %-100 %]       Intake/Output last 3 Shifts:    Intake/Output Summary (Last 24 hours) at 6/11/2025 1101  Last data filed at 6/11/2025 0600  Gross per 24 hour   Intake 1748 ml   Output 3028 ml   Net -1280 ml       Physical Exam  Constitutional:       General: She is awake. She is not in acute distress.  Cardiovascular:      Heart sounds:      No friction rub.   Pulmonary:      Effort: Pulmonary effort is normal.      Comments: Mildly diminished breath sounds  Abdominal:      General: Bowel sounds are normal.      Palpations: Abdomen is soft.      Tenderness: There is no guarding or rebound.   Musculoskeletal:      Comments: Mild edema.  Right femoral tunneled dialysis catheter present with no erythema or swelling around the site   Neurological:      Mental Status: She is alert.         Relevant Results  Results for orders placed or performed during the hospital encounter of 06/08/25 (from the past 24 hours)   CBC   Result Value Ref Range    WBC 3.8 (L) 4.4 - 11.3 x10*3/uL    nRBC 0.0 0.0 - 0.0 /100 WBCs    RBC 2.94 (L) 4.00 - 5.20 x10*6/uL    Hemoglobin 9.2 (L) 12.0 - 16.0 g/dL    Hematocrit 29.8 (L) 36.0 - 46.0 %     (H) 80 - 100 fL    MCH 31.3 26.0 - 34.0 pg    MCHC 30.9 (L) 32.0 - 36.0 g/dL    RDW 16.2 " (H) 11.5 - 14.5 %    Platelets 125 (L) 150 - 450 x10*3/uL   Renal Function Panel   Result Value Ref Range    Glucose 97 74 - 99 mg/dL    Sodium 136 136 - 145 mmol/L    Potassium 4.9 3.5 - 5.3 mmol/L    Chloride 97 (L) 98 - 107 mmol/L    Bicarbonate 25 21 - 32 mmol/L    Anion Gap 19 10 - 20 mmol/L    Urea Nitrogen 38 (H) 6 - 23 mg/dL    Creatinine 7.19 (H) 0.50 - 1.05 mg/dL    eGFR 6 (L) >60 mL/min/1.73m*2    Calcium 7.8 (L) 8.6 - 10.3 mg/dL    Phosphorus 6.2 (H) 2.5 - 4.9 mg/dL    Albumin 3.7 3.4 - 5.0 g/dL   Vancomycin   Result Value Ref Range    Vancomycin 30.9 (H) 5.0 - 20.0 ug/mL                 Assessment & Plan  Complication associated with dialysis catheter    End-stage renal disease on hemodialysis (Multi)    Hyperkalemia    End-stage renal disease on hemodialysis  Hyperkalemia, resolved  Hypertension  Anemia     Plan: Patient is s/p tunneled dialysis catheter exchange today but she came off dialysis early yesterday due to significant pain in the dialysis catheter site which I asked that interventional radiology be notified about.  Also asked the nurse to notify the primary team about her nosebleeds.  Will await evaluation of the dialysis catheter and plan for next dialysis session tomorrow.  Ensure renal diet.    Vu Pedersen MD

## 2025-06-11 NOTE — NURSING NOTE
Upon arrival at the patients bedside  the patient was sitting on the edge of the bed eating salad w/ a friend at the bedside. Head to toe assessment, findings recorded, msp+4, The patient c/o rt groin pain treated with medications and adjuvant therapy, rounding ensued, care transferred at the bedside w/ the patient reporting that her nose is bleeding, Rn attempted to apply ice pack and or ice to help promote vasoconstriction but the patient refused.  The patient remained free of distress care transferred without incidence.

## 2025-06-12 ENCOUNTER — APPOINTMENT (OUTPATIENT)
Dept: DIALYSIS | Facility: HOSPITAL | Age: 50
End: 2025-06-12
Payer: MEDICARE

## 2025-06-12 LAB
ALBUMIN SERPL BCP-MCNC: 3.7 G/DL (ref 3.4–5)
ANION GAP SERPL CALCULATED.3IONS-SCNC: 23 MMOL/L (ref 10–20)
BACTERIA BLD CULT: NORMAL
BACTERIA BLD CULT: NORMAL
BUN SERPL-MCNC: 46 MG/DL (ref 6–23)
CALCIUM SERPL-MCNC: 7.3 MG/DL (ref 8.6–10.3)
CHLORIDE SERPL-SCNC: 97 MMOL/L (ref 98–107)
CO2 SERPL-SCNC: 22 MMOL/L (ref 21–32)
CREAT SERPL-MCNC: 8.72 MG/DL (ref 0.5–1.05)
EGFRCR SERPLBLD CKD-EPI 2021: 5 ML/MIN/1.73M*2
ERYTHROCYTE [DISTWIDTH] IN BLOOD BY AUTOMATED COUNT: 15.7 % (ref 11.5–14.5)
GLUCOSE BLD MANUAL STRIP-MCNC: 102 MG/DL (ref 74–99)
GLUCOSE BLD MANUAL STRIP-MCNC: 109 MG/DL (ref 74–99)
GLUCOSE BLD MANUAL STRIP-MCNC: 94 MG/DL (ref 74–99)
GLUCOSE SERPL-MCNC: 89 MG/DL (ref 74–99)
HCT VFR BLD AUTO: 29.3 % (ref 36–46)
HGB BLD-MCNC: 9.3 G/DL (ref 12–16)
MCH RBC QN AUTO: 31.2 PG (ref 26–34)
MCHC RBC AUTO-ENTMCNC: 31.7 G/DL (ref 32–36)
MCV RBC AUTO: 98 FL (ref 80–100)
NRBC BLD-RTO: 0 /100 WBCS (ref 0–0)
PHOSPHATE SERPL-MCNC: 7.1 MG/DL (ref 2.5–4.9)
PLATELET # BLD AUTO: 121 X10*3/UL (ref 150–450)
POTASSIUM SERPL-SCNC: 5.2 MMOL/L (ref 3.5–5.3)
RBC # BLD AUTO: 2.98 X10*6/UL (ref 4–5.2)
SODIUM SERPL-SCNC: 137 MMOL/L (ref 136–145)
WBC # BLD AUTO: 4.1 X10*3/UL (ref 4.4–11.3)

## 2025-06-12 PROCEDURE — 2500000001 HC RX 250 WO HCPCS SELF ADMINISTERED DRUGS (ALT 637 FOR MEDICARE OP): Performed by: NURSE PRACTITIONER

## 2025-06-12 PROCEDURE — 2500000004 HC RX 250 GENERAL PHARMACY W/ HCPCS (ALT 636 FOR OP/ED): Performed by: NURSE PRACTITIONER

## 2025-06-12 PROCEDURE — 1210000001 HC SEMI-PRIVATE ROOM DAILY

## 2025-06-12 PROCEDURE — 36415 COLL VENOUS BLD VENIPUNCTURE: CPT | Performed by: INTERNAL MEDICINE

## 2025-06-12 PROCEDURE — A4217 STERILE WATER/SALINE, 500 ML: HCPCS | Performed by: INTERNAL MEDICINE

## 2025-06-12 PROCEDURE — 82947 ASSAY GLUCOSE BLOOD QUANT: CPT

## 2025-06-12 PROCEDURE — 8010000001 HC DIALYSIS - HEMODIALYSIS PER DAY

## 2025-06-12 PROCEDURE — 2500000001 HC RX 250 WO HCPCS SELF ADMINISTERED DRUGS (ALT 637 FOR MEDICARE OP): Performed by: INTERNAL MEDICINE

## 2025-06-12 PROCEDURE — 85027 COMPLETE CBC AUTOMATED: CPT | Performed by: INTERNAL MEDICINE

## 2025-06-12 PROCEDURE — 99232 SBSQ HOSP IP/OBS MODERATE 35: CPT | Performed by: NURSE PRACTITIONER

## 2025-06-12 PROCEDURE — 2500000002 HC RX 250 W HCPCS SELF ADMINISTERED DRUGS (ALT 637 FOR MEDICARE OP, ALT 636 FOR OP/ED): Performed by: INTERNAL MEDICINE

## 2025-06-12 PROCEDURE — 2500000004 HC RX 250 GENERAL PHARMACY W/ HCPCS (ALT 636 FOR OP/ED): Performed by: INTERNAL MEDICINE

## 2025-06-12 PROCEDURE — 80069 RENAL FUNCTION PANEL: CPT | Performed by: INTERNAL MEDICINE

## 2025-06-12 RX ORDER — AMLODIPINE BESYLATE 5 MG/1
5 TABLET ORAL DAILY
Status: DISCONTINUED | OUTPATIENT
Start: 2025-06-12 | End: 2025-06-19 | Stop reason: HOSPADM

## 2025-06-12 RX ORDER — SEVELAMER CARBONATE 800 MG/1
800 TABLET, FILM COATED ORAL
Status: DISCONTINUED | OUTPATIENT
Start: 2025-06-12 | End: 2025-06-19 | Stop reason: HOSPADM

## 2025-06-12 RX ORDER — OXYCODONE HYDROCHLORIDE 5 MG/1
10 TABLET ORAL EVERY 4 HOURS PRN
Refills: 0 | Status: DISCONTINUED | OUTPATIENT
Start: 2025-06-12 | End: 2025-06-13

## 2025-06-12 RX ORDER — AMLODIPINE BESYLATE 5 MG/1
5 TABLET ORAL DAILY
Status: DISCONTINUED | OUTPATIENT
Start: 2025-06-12 | End: 2025-06-12

## 2025-06-12 RX ORDER — CLONIDINE HYDROCHLORIDE 0.3 MG/1
0.3 TABLET ORAL EVERY 8 HOURS SCHEDULED
Status: DISCONTINUED | OUTPATIENT
Start: 2025-06-12 | End: 2025-06-19 | Stop reason: HOSPADM

## 2025-06-12 RX ORDER — HYDROMORPHONE HYDROCHLORIDE 1 MG/ML
INJECTION, SOLUTION INTRAMUSCULAR; INTRAVENOUS; SUBCUTANEOUS
Status: DISPENSED
Start: 2025-06-12 | End: 2025-06-13

## 2025-06-12 RX ORDER — HYDROMORPHONE HYDROCHLORIDE 1 MG/ML
0.6 INJECTION, SOLUTION INTRAMUSCULAR; INTRAVENOUS; SUBCUTANEOUS
Status: DISCONTINUED | OUTPATIENT
Start: 2025-06-12 | End: 2025-06-13

## 2025-06-12 RX ORDER — HYDRALAZINE HYDROCHLORIDE 20 MG/ML
5 INJECTION INTRAMUSCULAR; INTRAVENOUS ONCE
Status: COMPLETED | OUTPATIENT
Start: 2025-06-12 | End: 2025-06-12

## 2025-06-12 RX ADMIN — HYDROMORPHONE HYDROCHLORIDE 0.4 MG: 1 INJECTION, SOLUTION INTRAMUSCULAR; INTRAVENOUS; SUBCUTANEOUS at 09:57

## 2025-06-12 RX ADMIN — PREGABALIN 75 MG: 75 CAPSULE ORAL at 12:59

## 2025-06-12 RX ADMIN — CLONIDINE HYDROCHLORIDE 0.3 MG: 0.2 TABLET ORAL at 06:46

## 2025-06-12 RX ADMIN — OXYCODONE HYDROCHLORIDE 10 MG: 5 TABLET ORAL at 21:48

## 2025-06-12 RX ADMIN — SULFAMETHOXAZOLE AND TRIMETHOPRIM 1 TABLET: 400; 80 TABLET ORAL at 21:44

## 2025-06-12 RX ADMIN — ASPIRIN 81 MG: 81 TABLET, COATED ORAL at 12:59

## 2025-06-12 RX ADMIN — DIPHENHYDRAMINE HYDROCHLORIDE 50 MG: 50 INJECTION, SOLUTION INTRAMUSCULAR; INTRAVENOUS at 13:03

## 2025-06-12 RX ADMIN — CARVEDILOL 12.5 MG: 12.5 TABLET, FILM COATED ORAL at 21:43

## 2025-06-12 RX ADMIN — HYDRALAZINE HYDROCHLORIDE 5 MG: 20 INJECTION INTRAMUSCULAR; INTRAVENOUS at 12:05

## 2025-06-12 RX ADMIN — SULFAMETHOXAZOLE AND TRIMETHOPRIM 1 TABLET: 400; 80 TABLET ORAL at 12:59

## 2025-06-12 RX ADMIN — DIPHENHYDRAMINE HYDROCHLORIDE 50 MG: 50 INJECTION, SOLUTION INTRAMUSCULAR; INTRAVENOUS at 06:46

## 2025-06-12 RX ADMIN — CLONIDINE HYDROCHLORIDE 0.3 MG: 0.3 TABLET ORAL at 21:43

## 2025-06-12 RX ADMIN — AMLODIPINE BESYLATE 5 MG: 5 TABLET ORAL at 11:16

## 2025-06-12 RX ADMIN — VANCOMYCIN HYDROCHLORIDE 2 ML: 500 INJECTION, POWDER, LYOPHILIZED, FOR SOLUTION INTRAVENOUS at 13:00

## 2025-06-12 RX ADMIN — HYDROMORPHONE HYDROCHLORIDE 0.4 MG: 1 INJECTION, SOLUTION INTRAMUSCULAR; INTRAVENOUS; SUBCUTANEOUS at 06:46

## 2025-06-12 RX ADMIN — HYDROMORPHONE HYDROCHLORIDE 0.6 MG: 1 INJECTION, SOLUTION INTRAMUSCULAR; INTRAVENOUS; SUBCUTANEOUS at 12:58

## 2025-06-12 RX ADMIN — CALCITRIOL CAPSULES 0.25 MCG 0.5 MCG: 0.25 CAPSULE ORAL at 12:59

## 2025-06-12 RX ADMIN — Medication 2 ML: at 13:01

## 2025-06-12 RX ADMIN — CLONIDINE HYDROCHLORIDE 0.3 MG: 0.3 TABLET ORAL at 13:01

## 2025-06-12 RX ADMIN — CARVEDILOL 12.5 MG: 12.5 TABLET, FILM COATED ORAL at 11:16

## 2025-06-12 RX ADMIN — DIPHENHYDRAMINE HYDROCHLORIDE 50 MG: 50 INJECTION, SOLUTION INTRAMUSCULAR; INTRAVENOUS at 09:57

## 2025-06-12 ASSESSMENT — PAIN - FUNCTIONAL ASSESSMENT
PAIN_FUNCTIONAL_ASSESSMENT: 0-10

## 2025-06-12 ASSESSMENT — COGNITIVE AND FUNCTIONAL STATUS - GENERAL
MOBILITY SCORE: 24
DAILY ACTIVITIY SCORE: 24
DAILY ACTIVITIY SCORE: 24
MOBILITY SCORE: 24

## 2025-06-12 ASSESSMENT — PAIN SCALES - GENERAL
PAINLEVEL_OUTOF10: 7
PAINLEVEL_OUTOF10: 10 - WORST POSSIBLE PAIN
PAINLEVEL_OUTOF10: 7
PAINLEVEL_OUTOF10: 7

## 2025-06-12 ASSESSMENT — PAIN DESCRIPTION - DESCRIPTORS
DESCRIPTORS: ACHING
DESCRIPTORS: ACHING;DISCOMFORT;SHARP
DESCRIPTORS: SHARP

## 2025-06-12 ASSESSMENT — PAIN DESCRIPTION - ORIENTATION
ORIENTATION: INNER;RIGHT
ORIENTATION: RIGHT
ORIENTATION: RIGHT

## 2025-06-12 ASSESSMENT — PAIN DESCRIPTION - LOCATION
LOCATION: GROIN
LOCATION: LEG
LOCATION: ABDOMEN

## 2025-06-12 NOTE — PROGRESS NOTES
Batsheva Page is a 49 y.o. female on day 4 of admission presenting with Complication associated with dialysis catheter.      Subjective   Patient was seen on dialysis and the catheter was working well however she continues to have significant pain in the dialysis catheter site.  She was evaluated by interventional radiology for this.  She has no other complaints.  Her blood pressure was significantly elevated in the 190s systolic this morning, now coming down to 173 systolic.       Objective          Vitals 24HR  Heart Rate:  [62-87]   Temp:  [36 °C (96.8 °F)-36.8 °C (98.2 °F)]   Resp:  [16-17]   BP: (160-195)/(66-98)   SpO2:  [97 %-99 %]       Intake/Output last 3 Shifts:    Intake/Output Summary (Last 24 hours) at 6/12/2025 1040  Last data filed at 6/12/2025 1000  Gross per 24 hour   Intake 644 ml   Output 0 ml   Net 644 ml       Physical Exam  Constitutional:       General: She is awake. She is not in acute distress.  Cardiovascular:      Heart sounds:      No friction rub.   Pulmonary:      Effort: Pulmonary effort is normal.      Comments: Mildly diminished breath sounds  Abdominal:      General: Bowel sounds are normal.      Palpations: Abdomen is soft.      Tenderness: There is no guarding or rebound.   Musculoskeletal:      Comments: 1+ edema.  Right femoral tunneled dialysis catheter present     Relevant Results  Results for orders placed or performed during the hospital encounter of 06/08/25 (from the past 24 hours)   POCT GLUCOSE   Result Value Ref Range    POCT Glucose 110 (H) 74 - 99 mg/dL   CBC   Result Value Ref Range    WBC 4.1 (L) 4.4 - 11.3 x10*3/uL    nRBC 0.0 0.0 - 0.0 /100 WBCs    RBC 2.98 (L) 4.00 - 5.20 x10*6/uL    Hemoglobin 9.3 (L) 12.0 - 16.0 g/dL    Hematocrit 29.3 (L) 36.0 - 46.0 %    MCV 98 80 - 100 fL    MCH 31.2 26.0 - 34.0 pg    MCHC 31.7 (L) 32.0 - 36.0 g/dL    RDW 15.7 (H) 11.5 - 14.5 %    Platelets 121 (L) 150 - 450 x10*3/uL   Renal Function Panel   Result Value Ref Range     Glucose 89 74 - 99 mg/dL    Sodium 137 136 - 145 mmol/L    Potassium 5.2 3.5 - 5.3 mmol/L    Chloride 97 (L) 98 - 107 mmol/L    Bicarbonate 22 21 - 32 mmol/L    Anion Gap 23 (H) 10 - 20 mmol/L    Urea Nitrogen 46 (H) 6 - 23 mg/dL    Creatinine 8.72 (H) 0.50 - 1.05 mg/dL    eGFR 5 (L) >60 mL/min/1.73m*2    Calcium 7.3 (L) 8.6 - 10.3 mg/dL    Phosphorus 7.1 (H) 2.5 - 4.9 mg/dL    Albumin 3.7 3.4 - 5.0 g/dL   POCT GLUCOSE   Result Value Ref Range    POCT Glucose 102 (H) 74 - 99 mg/dL                 Assessment & Plan  Complication associated with dialysis catheter    End-stage renal disease on hemodialysis (Multi)    Hyperkalemia    End-stage renal disease on hemodialysis  Hyperkalemia, resolved  Hypertension  Anemia     Plan: Patient is s/p tunneled dialysis catheter exchange today but still with significant pain in the dialysis catheter site which interventional radiology evaluated and is being notified about again today.  Increased ultrafiltration to 2.5 L.  Add amlodipine 5 mg once a day.  Start Renvela for hyperphosphatemia.  Ensure renal diet.    Vu Pedersen MD

## 2025-06-12 NOTE — CARE PLAN
IR Attending Note    Ms. Page is 48 yo female with ESRD on hemodialysis, end-stage vascular access with central thoracic venous occlusion, currently receiving HD through tunneled R CFV HD catheter.    Dislodged R groin catheter was exchanged on 6/9 for new catheter.  HD on 6/10 shortened secondary to significant pain at the HD catheter site.  Consulted for reassessment of HD catheter.    In my conversation with her this morning, she reports catheter pain which she states she first noticed last Thursday (6/5), somewhat worsened since TDC exchange.  This seemed exacerbated by her HD session on 6/10.    Catheter site is clear appearing, without erythema or drainage.  Dressing intact.  Tender to palpation in the R groin generally.    Her options for catheter manipulation are quite limited.  Review of imaging suggests prior L groin catheters, with associated iliac venous stenosis.  Translumbar or transhepatic catheter options are not advised as an alternative access at this time.  Removal of the R groin catheter would come with some risk of inability to replace into the presumably stenotic vein.    I discussed this with her.  Ultimately, we decided that she will reattempt HD today through the existing catheter, as some of her discomfort may be related to the recent exchange, now days ago.    IR will continue to be available for consultation if issues persist.

## 2025-06-12 NOTE — NURSING NOTE
Upon arrival at the patients bedside the patient was laying in bed c/o pain at the hemodialysis right femoral  site . The patient reports the pain constant 7/10 the lowest. The patient also reports having 3 catheter replacements since 12/24. The patient medicated with medications and adjuvant therapy, patient stable, rounding ensued  no changes of condition to report.

## 2025-06-12 NOTE — PROGRESS NOTES
"INFECTIOUS DISEASES PROGRESS NOTE    Consulted / following patient for:  Displaced and infected tunneled dialysis catheter    Subjective   Interval History:   No systemic symptoms  Complains of persistent pain at the site of the replaced right femoral dialysis catheter, exacerbated by active dialysis    Objective   PHYSICAL EXAMINATION  Vital signs:  Visit Vitals  BP (!) 202/93   Pulse 59   Temp 36.1 °C (97 °F) (Temporal)   Resp 16      General: Undergoing hemodialysis through the right femoral catheter, complaining of pain  Lungs:  Clear to auscultation  Heart:  S1, S2 normal, crisp valve sounds, no pathologic murmur appreciated  Abdomen:  Soft, nontender. No palpable organs or masses  Extremities: New tunneled right femoral dialysis catheter in place.  No suppuration.  Tender..  Surrounding erythema resolved    Relevant Results  WBC: 4100    Microbiology:  Blood (6/8): Negative X2  Right femoral wound (6/9): MRSA, susceptible to Bactrim        ASSESSMENT:  Rule out dialysis catheter infection  Catheter became partially dislodged and developed surrounding erythema.  Blood cultures negative to date.  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  Does not appear septic.  Wound culture growing MRSA, as expected.  Catheter has been exchanged but she is complaining of significant pain -- awaiting further reassessment by IR        PLANS:  -   Continue TMP-SMX for 5 more days (6/17)  -   Await reassessment of dialysis catheter by IR physician  -   Continue \"vancomycin dwell\" after each dialysis, will ask the assistance of Dr. Nuvia Soto MD  [facility] [email or web address]  Office:  [phone]  "

## 2025-06-12 NOTE — PROGRESS NOTES
Batsheva Page is a 49 y.o. female on day 4 of admission presenting with Complication associated with dialysis catheter.      Subjective   Pt seen and examined at bedside. Awake/alert/oriented.  Denies chest pain, shortness of breath, fevers, chills, nausea, or vomiting.  Still having pain to dialysis site, medicated for pain by RN. Seen by IR this morning, plan to attempt HD today with existing catheter and monitor. If pain persists, IR available for consult.        Objective     Last Recorded Vitals  BP (!) 179/98   Pulse 64   Temp 36.1 °C (97 °F) (Temporal)   Resp 16   Wt 69 kg (152 lb 1.9 oz)   SpO2 97%   Intake/Output last 3 Shifts:    Intake/Output Summary (Last 24 hours) at 6/12/2025 1010  Last data filed at 6/12/2025 0921  Gross per 24 hour   Intake 644 ml   Output 0 ml   Net 644 ml       Admission Weight  Weight: 69.4 kg (153 lb) (06/08/25 1152)    Daily Weight  06/10/25 : 69 kg (152 lb 1.9 oz)    Image Results  IR CVC tunneled  Narrative: Interpreted By:  Melvin Medina,   STUDY:  IR CVC TUNNELED;  6/10/2025 1:08 pm      INDICATION:  Signs/Symptoms:Dislodged HD line.      COMPARISON:  None.      ACCESSION NUMBER(S):  [id number]      ORDERING CLINICIAN:  HALLEY CHAMORRO      TECHNIQUE:  INTERVENTIONALIST(S):  Manuel Medina MD      CONSENT:  The patient/patient's POA/next of kin was informed of the nature of  the proposed procedure. The purposes, alternatives, risks, and  benefits were explained and discussed. All questions were answered  and consent was obtained.      RADIATION EXPOSURE:  Fluoroscopy time: 0.7 min  Dose: 3.72 mGy  Dose Area Product (DAP): 1342 mGy*cm^2      SEDATION:  Mac anesthesia was provided by the department of anesthesiology in  maintained throughout the duration of the procedure.      MEDICATION:  None.      TIME OUT:  A time out was performed immediately prior to procedure start with  the interventional team, correctly identifying the patient name, date  of birth,  MRN, procedure, anatomy (including marking of site and  side), patient position, procedure consent form, relevant laboratory  and imaging test results, antibiotic administration, safety  precautions, and procedure-specific equipment needs.      COMPLICATIONS:  No immediate adverse events identified.      FINDINGS:  In the recumbent position, the patient was positioned on the  angiography table. MAC anesthesia was initiated by the Department of  Anesthesiology and maintained throughout the duration of the  procedure. The right inguinal cutaneous tissues and existing catheter  were prepared and draped in usual sterile manner. An 035 Amplatz wire  was placed through 1 of the lumens of the existing catheter. The  existing catheter was removed.  After Lidocaine 1% local anesthesia,  a 14.5 Ghanaian x 44 cm hemodialysis catheter was then placed with  tract continuity and its central catheter tip(s) to reside at the  cavoatrial junction. A fluoroscopic spot image of the chest was  acquired in the AP projection to confirm optimal location. The  catheter ports were aspirated and flushed without resistance with  normal saline. The catheter ports were then charged with  high-concentration heparin. The external portions of the catheter  were secured with a purse-string 2-0 polypropylene suture and sterile  dressings.      The patient tolerated the procedure without complication.      Impression: 1. Uncomplicated exchange of a tunneled right femoral 14.5 Fr x 44 cm  hemodialysis catheter. The catheter is ready for immediate use.      Performed and dictated at [facility].      MACRO:  None.      Signed by: Melvin Medina 6/10/2025 4:02 PM  Dictation workstation:   [id number]      Physical Exam  Vitals and nursing note reviewed.   Constitutional:       General: She is not in acute distress.     Appearance: Normal appearance. She is not toxic-appearing.   HENT:      Head: Normocephalic and  atraumatic.      Nose: Nose normal.      Mouth/Throat:      Mouth: Mucous membranes are moist.   Eyes:      General: Lids are normal.      Extraocular Movements: Extraocular movements intact.      Conjunctiva/sclera: Conjunctivae normal.   Cardiovascular:      Rate and Rhythm: Normal rate and regular rhythm.   Pulmonary:      Effort: Pulmonary effort is normal.      Breath sounds: Normal breath sounds. No wheezing, rhonchi or rales.   Abdominal:      General: Bowel sounds are normal.      Palpations: Abdomen is soft.      Tenderness: There is no abdominal tenderness.   Musculoskeletal:         General: Normal range of motion.      Cervical back: Normal range of motion and neck supple.   Skin:     General: Skin is warm and dry.      Capillary Refill: Capillary refill takes less than 2 seconds.      Findings: Erythema present.   Neurological:      General: No focal deficit present.      Mental Status: She is alert and oriented to person, place, and time.       Relevant Results  Lab Results   Component Value Date    GLUCOSE 89 06/12/2025    CALCIUM 7.3 (L) 06/12/2025     06/12/2025    K 5.2 06/12/2025    CO2 22 06/12/2025    CL 97 (L) 06/12/2025    BUN 46 (H) 06/12/2025    CREATININE 8.72 (H) 06/12/2025      Lab Results   Component Value Date    WBC 4.1 (L) 06/12/2025    HGB 9.3 (L) 06/12/2025    HCT 29.3 (L) 06/12/2025    MCV 98 06/12/2025     (L) 06/12/2025        CBC, BMP, Vital signs reviewed    Assessment and Plan    Infected hemodialysis catheter   per patient HD line sliding noted erythema and purulent drainage  -blood cultures negative to date  - wound culture MRSA  - ID consultation following, appreciate recs.  Plan for Bactrim and vancomycin dwell  - Status post hemodialysis catheter exchange on 6/10/2025.  Now complaining of severe pain to new hemodialysis catheter site.  Interventional radiologist reconsulted.  Appreciate recommendations.     ESRD on hemodialysis  hyperkalemia resolved  - HD->  M/W/F   - nephrology consult placed  - Patient unable to complete full dialysis on 6/10/2025, HD 6/12/25  - Hemodialysis per nephrology     HTN  - Continue antihypertensives  - Monitor blood pressure close     Anemia  - Likely secondary to CKD vs ABNER  - Check iron panel-reviewed, occult stool  - Hemoglobin 9.2-stable  - PPI     Seizure  - resumed home medication  - seizure precaution     CAD  - ASA and statin  - no chest pain currently     Anxiety/depression  - resumed home medications      GI prophylaxis  -PPI     DVT prophylaxis  - subcutaneous heparin     Epistaxis  - Nasal spray  - Monitor H&H  - ENT consult if needed     Plan  Monitor on telemetry  Monitor fluid/electrolytes close  Appreciate recs from IR,  Seen by IR this morning, plan to attempt HD today with existing catheter and monitor. If pain persists, IR available for reconsult.   Hemodialysis per nephrology  Continue antibiotics, infectious disease following.  Recommendation for Bactrim and vancomycin dwell  DVT prophylaxis  CBC and BMP in AM    Plan for discharge home when able to tolerate HD      Noelle Doss, APRN-CNP

## 2025-06-12 NOTE — POST-PROCEDURE NOTE
Report to Receiving RN:    Report To: FARAZ Snyder  Time Report Called: 1232  Hand-Off Communication: Patient was able to complete her full 3hr dialysis. 2.5L removed, BP reading 196/95 HR 65. I do not have a manual BP to check, but she is in transport to return to room. Catheter ports were locked with saline only, capped securely. I am sending her with another set of caps so you can put the vanco dwell in and cap it off again.   Complications During Treatment: Yes, hypertension, dr AURE Pedersen aware  Ultrafiltration Treatment: Yes, 2.5L  Medications Administered During Dialysis: Yes, BP and pain meds given by FARAZ Ca  Blood Products Administered During Dialysis: No  Labs Sent During Dialysis: No  Heparin Drip Rate Changes: No  Dialysis Catheter Dressing: clean, dry and intact  Last Dressing Change: today    Electronic Signatures:  Heidy HANNA

## 2025-06-12 NOTE — NURSING NOTE
Patient's to L. Upper Arm infiltrated,patient a hard stick, IV nurse called to help obtain IV access. IV nurse unsuccessful with placing a new line on patient. NP notified via secure chat of loss of IV access, adjustments made to patient's medications. Will continue to assess and provide nursing care.

## 2025-06-12 NOTE — PROGRESS NOTES
Anticipate discharge soon. Patient will return home with no skilled services. Will follow as needed.      06/12/25 1057   Discharge Planning   Home or Post Acute Services None   Expected Discharge Disposition Home   Does the patient need discharge transport arranged? No

## 2025-06-12 NOTE — PRE-PROCEDURE NOTE
Report from Sending RN:    Report From: FARAZ Snyder  Recent Surgery of Procedure: Yes, new TDC placed 6/10/25  Baseline Level of Consciousness (LOC): A&Ox4  Oxygen Use: No  Type: n/a  Diabetic: No  Last BP Med Given Day of Dialysis: see EMAR  Last Pain Med Given: see EMAR  Lab Tests to be Obtained with Dialysis: No  Blood Transfusion to be Given During Dialysis: No  Available IV Access: Yes  Medications to be Administered During Dialysis: No  Continuous IV Infusion Running: No  Restraints on Currently or in the Last 24 Hours: No  Hand-Off Communication: stable and ready for dialysis in HD room  Dialysis Catheter Dressing: will assess when patient arrives  Last Dressing Change: will assess when patient arrives

## 2025-06-12 NOTE — CARE PLAN
Problem: Pain - Adult  Goal: Verbalizes/displays adequate comfort level or baseline comfort level  Outcome: Progressing  Flowsheets (Taken 6/11/2025 2226)  Verbalizes/displays adequate comfort level or baseline comfort level:   Assess pain using appropriate pain scale   Encourage patient to monitor pain and request assistance   Administer analgesics based on type and severity of pain and evaluate response   Implement non-pharmacological measures as appropriate and evaluate response

## 2025-06-12 NOTE — NURSING NOTE
Vascular access note    Patient with Rt femoral dialysis catheters, dressing D&I.  Called to place PIV, attempted x 1 to place accucath in Rt AC, unable to obtain blood return or flush after threading catheter, line removed intact, assessed bilateral FA, Rt FA no veins visible to attempt, Lt FA edematous from infiltration of PIV.  RN aware.  Patient a known difficult IV access due to her complicated vasculature.

## 2025-06-13 LAB
ANION GAP SERPL CALCULATED.3IONS-SCNC: 16 MMOL/L (ref 10–20)
BUN SERPL-MCNC: 28 MG/DL (ref 6–23)
CALCIUM SERPL-MCNC: 8.4 MG/DL (ref 8.6–10.3)
CHLORIDE SERPL-SCNC: 98 MMOL/L (ref 98–107)
CO2 SERPL-SCNC: 27 MMOL/L (ref 21–32)
CREAT SERPL-MCNC: 5.48 MG/DL (ref 0.5–1.05)
EGFRCR SERPLBLD CKD-EPI 2021: 9 ML/MIN/1.73M*2
ERYTHROCYTE [DISTWIDTH] IN BLOOD BY AUTOMATED COUNT: 15.8 % (ref 11.5–14.5)
GLUCOSE BLD MANUAL STRIP-MCNC: 106 MG/DL (ref 74–99)
GLUCOSE BLD MANUAL STRIP-MCNC: 130 MG/DL (ref 74–99)
GLUCOSE BLD MANUAL STRIP-MCNC: 131 MG/DL (ref 74–99)
GLUCOSE BLD MANUAL STRIP-MCNC: 98 MG/DL (ref 74–99)
GLUCOSE SERPL-MCNC: 91 MG/DL (ref 74–99)
HCT VFR BLD AUTO: 30.8 % (ref 36–46)
HGB BLD-MCNC: 9.5 G/DL (ref 12–16)
MCH RBC QN AUTO: 30.9 PG (ref 26–34)
MCHC RBC AUTO-ENTMCNC: 30.8 G/DL (ref 32–36)
MCV RBC AUTO: 100 FL (ref 80–100)
NRBC BLD-RTO: 0 /100 WBCS (ref 0–0)
PLATELET # BLD AUTO: 142 X10*3/UL (ref 150–450)
POTASSIUM SERPL-SCNC: 4.3 MMOL/L (ref 3.5–5.3)
RBC # BLD AUTO: 3.07 X10*6/UL (ref 4–5.2)
SODIUM SERPL-SCNC: 137 MMOL/L (ref 136–145)
WBC # BLD AUTO: 4 X10*3/UL (ref 4.4–11.3)

## 2025-06-13 PROCEDURE — 99233 SBSQ HOSP IP/OBS HIGH 50: CPT | Performed by: NURSE PRACTITIONER

## 2025-06-13 PROCEDURE — 2500000001 HC RX 250 WO HCPCS SELF ADMINISTERED DRUGS (ALT 637 FOR MEDICARE OP): Performed by: INTERNAL MEDICINE

## 2025-06-13 PROCEDURE — 85027 COMPLETE CBC AUTOMATED: CPT | Performed by: NURSE PRACTITIONER

## 2025-06-13 PROCEDURE — 2500000001 HC RX 250 WO HCPCS SELF ADMINISTERED DRUGS (ALT 637 FOR MEDICARE OP): Performed by: NURSE PRACTITIONER

## 2025-06-13 PROCEDURE — 1200000002 HC GENERAL ROOM WITH TELEMETRY DAILY

## 2025-06-13 PROCEDURE — 99497 ADVNCD CARE PLAN 30 MIN: CPT

## 2025-06-13 PROCEDURE — 2500000001 HC RX 250 WO HCPCS SELF ADMINISTERED DRUGS (ALT 637 FOR MEDICARE OP)

## 2025-06-13 PROCEDURE — 36415 COLL VENOUS BLD VENIPUNCTURE: CPT | Performed by: NURSE PRACTITIONER

## 2025-06-13 PROCEDURE — 80048 BASIC METABOLIC PNL TOTAL CA: CPT | Performed by: NURSE PRACTITIONER

## 2025-06-13 PROCEDURE — 99223 1ST HOSP IP/OBS HIGH 75: CPT

## 2025-06-13 PROCEDURE — 82947 ASSAY GLUCOSE BLOOD QUANT: CPT

## 2025-06-13 PROCEDURE — 2500000002 HC RX 250 W HCPCS SELF ADMINISTERED DRUGS (ALT 637 FOR MEDICARE OP, ALT 636 FOR OP/ED): Performed by: INTERNAL MEDICINE

## 2025-06-13 RX ORDER — LIDOCAINE 50 MG/G
1 OINTMENT TOPICAL EVERY 6 HOURS PRN
Status: DISCONTINUED | OUTPATIENT
Start: 2025-06-13 | End: 2025-06-19 | Stop reason: HOSPADM

## 2025-06-13 RX ORDER — NALOXONE HYDROCHLORIDE 0.4 MG/ML
0.4 INJECTION, SOLUTION INTRAMUSCULAR; INTRAVENOUS; SUBCUTANEOUS EVERY 5 MIN PRN
Status: DISCONTINUED | OUTPATIENT
Start: 2025-06-13 | End: 2025-06-13

## 2025-06-13 RX ORDER — HYDROMORPHONE HYDROCHLORIDE 5 MG/5ML
0.5 SOLUTION ORAL ONCE AS NEEDED
Refills: 0 | Status: DISCONTINUED | OUTPATIENT
Start: 2025-06-13 | End: 2025-06-13

## 2025-06-13 RX ORDER — METHOCARBAMOL 500 MG/1
500 TABLET, FILM COATED ORAL EVERY 8 HOURS SCHEDULED
Status: DISCONTINUED | OUTPATIENT
Start: 2025-06-13 | End: 2025-06-19 | Stop reason: HOSPADM

## 2025-06-13 RX ORDER — ACETAMINOPHEN 325 MG/1
650 TABLET ORAL EVERY 6 HOURS
Status: DISCONTINUED | OUTPATIENT
Start: 2025-06-13 | End: 2025-06-19 | Stop reason: HOSPADM

## 2025-06-13 RX ORDER — HYDROMORPHONE HYDROCHLORIDE 2 MG/1
1 TABLET ORAL EVERY 6 HOURS
Refills: 0 | Status: DISCONTINUED | OUTPATIENT
Start: 2025-06-13 | End: 2025-06-14

## 2025-06-13 RX ORDER — LORAZEPAM 0.5 MG/1
0.25 TABLET ORAL EVERY 6 HOURS PRN
Status: DISCONTINUED | OUTPATIENT
Start: 2025-06-13 | End: 2025-06-14

## 2025-06-13 RX ORDER — NALOXONE HYDROCHLORIDE 1 MG/ML
0.4 INJECTION INTRAMUSCULAR; INTRAVENOUS; SUBCUTANEOUS EVERY 5 MIN PRN
Status: DISCONTINUED | OUTPATIENT
Start: 2025-06-13 | End: 2025-06-13

## 2025-06-13 RX ORDER — OXYCODONE HYDROCHLORIDE 5 MG/1
5 TABLET ORAL EVERY 4 HOURS PRN
Refills: 0 | Status: DISCONTINUED | OUTPATIENT
Start: 2025-06-13 | End: 2025-06-19 | Stop reason: HOSPADM

## 2025-06-13 RX ORDER — DIPHENHYDRAMINE HCL 50 MG
50 CAPSULE ORAL EVERY 6 HOURS PRN
Status: DISCONTINUED | OUTPATIENT
Start: 2025-06-13 | End: 2025-06-14

## 2025-06-13 RX ORDER — LORAZEPAM 0.5 MG/1
0.25 TABLET ORAL ONCE AS NEEDED
Status: COMPLETED | OUTPATIENT
Start: 2025-06-13 | End: 2025-06-13

## 2025-06-13 RX ORDER — HYDROMORPHONE HYDROCHLORIDE 2 MG/1
2 TABLET ORAL EVERY 6 HOURS SCHEDULED
Refills: 0 | Status: DISCONTINUED | OUTPATIENT
Start: 2025-06-13 | End: 2025-06-13

## 2025-06-13 RX ORDER — HYDROMORPHONE HYDROCHLORIDE 2 MG/1
1 TABLET ORAL ONCE AS NEEDED
Refills: 0 | Status: COMPLETED | OUTPATIENT
Start: 2025-06-13 | End: 2025-06-13

## 2025-06-13 RX ORDER — NALOXONE HYDROCHLORIDE 0.4 MG/ML
0.2 INJECTION, SOLUTION INTRAMUSCULAR; INTRAVENOUS; SUBCUTANEOUS EVERY 5 MIN PRN
Status: DISCONTINUED | OUTPATIENT
Start: 2025-06-13 | End: 2025-06-19 | Stop reason: HOSPADM

## 2025-06-13 RX ORDER — NALOXONE HYDROCHLORIDE 0.4 MG/ML
0.2 INJECTION, SOLUTION INTRAMUSCULAR; INTRAVENOUS; SUBCUTANEOUS EVERY 5 MIN PRN
Status: DISCONTINUED | OUTPATIENT
Start: 2025-06-13 | End: 2025-06-13

## 2025-06-13 RX ADMIN — LORAZEPAM 0.25 MG: 0.5 TABLET ORAL at 18:15

## 2025-06-13 RX ADMIN — SULFAMETHOXAZOLE AND TRIMETHOPRIM 1 TABLET: 400; 80 TABLET ORAL at 20:55

## 2025-06-13 RX ADMIN — OXYCODONE HYDROCHLORIDE 5 MG: 5 TABLET ORAL at 20:56

## 2025-06-13 RX ADMIN — CLONIDINE HYDROCHLORIDE 0.3 MG: 0.3 TABLET ORAL at 06:03

## 2025-06-13 RX ADMIN — CALCITRIOL CAPSULES 0.25 MCG 0.5 MCG: 0.25 CAPSULE ORAL at 08:11

## 2025-06-13 RX ADMIN — ACETAMINOPHEN 650 MG: 325 TABLET ORAL at 11:20

## 2025-06-13 RX ADMIN — AMLODIPINE BESYLATE 5 MG: 5 TABLET ORAL at 08:12

## 2025-06-13 RX ADMIN — CARVEDILOL 12.5 MG: 12.5 TABLET, FILM COATED ORAL at 20:56

## 2025-06-13 RX ADMIN — CLONIDINE HYDROCHLORIDE 0.3 MG: 0.3 TABLET ORAL at 20:56

## 2025-06-13 RX ADMIN — SALINE NASAL SPRAY 1 SPRAY: 1.5 SOLUTION NASAL at 11:23

## 2025-06-13 RX ADMIN — CLONIDINE HYDROCHLORIDE 0.3 MG: 0.3 TABLET ORAL at 13:39

## 2025-06-13 RX ADMIN — METHOCARBAMOL TABLETS 500 MG: 500 TABLET, COATED ORAL at 21:00

## 2025-06-13 RX ADMIN — HYDROMORPHONE HYDROCHLORIDE 1 MG: 2 TABLET ORAL at 11:20

## 2025-06-13 RX ADMIN — DIPHENHYDRAMINE HYDROCHLORIDE 50 MG: 50 CAPSULE ORAL at 11:20

## 2025-06-13 RX ADMIN — ASPIRIN 81 MG: 81 TABLET, COATED ORAL at 08:11

## 2025-06-13 RX ADMIN — HYDROMORPHONE HYDROCHLORIDE 1 MG: 2 TABLET ORAL at 18:16

## 2025-06-13 RX ADMIN — OXYCODONE HYDROCHLORIDE 5 MG: 5 TABLET ORAL at 13:39

## 2025-06-13 RX ADMIN — METHOCARBAMOL TABLETS 500 MG: 500 TABLET, COATED ORAL at 13:39

## 2025-06-13 RX ADMIN — HYDROMORPHONE HYDROCHLORIDE 1 MG: 2 TABLET ORAL at 16:58

## 2025-06-13 RX ADMIN — OXYCODONE HYDROCHLORIDE 10 MG: 5 TABLET ORAL at 06:07

## 2025-06-13 RX ADMIN — SALINE NASAL SPRAY 1 SPRAY: 1.5 SOLUTION NASAL at 15:49

## 2025-06-13 RX ADMIN — CARVEDILOL 12.5 MG: 12.5 TABLET, FILM COATED ORAL at 08:11

## 2025-06-13 RX ADMIN — SULFAMETHOXAZOLE AND TRIMETHOPRIM 1 TABLET: 400; 80 TABLET ORAL at 08:18

## 2025-06-13 ASSESSMENT — ENCOUNTER SYMPTOMS
GASTROINTESTINAL NEGATIVE: 1
FATIGUE: 1
DIZZINESS: 0
ROS SKIN COMMENTS: CELLULITIS
MYALGIAS: 1
COLOR CHANGE: 1
SHORTNESS OF BREATH: 0
CHEST TIGHTNESS: 0
LIGHT-HEADEDNESS: 0

## 2025-06-13 ASSESSMENT — COGNITIVE AND FUNCTIONAL STATUS - GENERAL
MOBILITY SCORE: 24
DAILY ACTIVITIY SCORE: 24

## 2025-06-13 ASSESSMENT — PAIN SCALES - GENERAL
PAINLEVEL_OUTOF10: 9
PAINLEVEL_OUTOF10: 10 - WORST POSSIBLE PAIN
PAINLEVEL_OUTOF10: 0 - NO PAIN
PAINLEVEL_OUTOF10: 10 - WORST POSSIBLE PAIN

## 2025-06-13 ASSESSMENT — PAIN - FUNCTIONAL ASSESSMENT
PAIN_FUNCTIONAL_ASSESSMENT: 0-10
PAIN_FUNCTIONAL_ASSESSMENT: WONG-BAKER FACES
PAIN_FUNCTIONAL_ASSESSMENT: 0-10

## 2025-06-13 ASSESSMENT — PAIN DESCRIPTION - LOCATION
LOCATION: GROIN
LOCATION: LEG

## 2025-06-13 ASSESSMENT — PAIN DESCRIPTION - DESCRIPTORS
DESCRIPTORS: SHARP

## 2025-06-13 ASSESSMENT — PAIN DESCRIPTION - ORIENTATION
ORIENTATION: RIGHT

## 2025-06-13 NOTE — INDIVIDUALIZED OVERALL PLAN OF CARE NOTE
Update: Spoke to the patient regarding her conversation with IR.  She was told by IR that she does not have many options for dialysis access.  We did explore this.  She states that she was preparing for this in the last couple of years.  She states that this would possibly be a relief as she does not have to make a decision.  We talked about the option of continuing dialysis with her current lying which is causing her pain-and aiming for better pain control with outpatient palliative care.  We also discussed stopping dialysis and implementing comfort measures.  She stated that this is a hard decision, I did validate this.  I did ask if we could bring her partner and daughter in to discuss this with her.  She stated that her partner was coming in tonight after work, and she would discuss her options.  I did update the primary team.  We will continue to follow.

## 2025-06-13 NOTE — NURSING NOTE
Pt has a R femoral dialysis catheter, drsg dry and intact, dated 6/12, pt having discomfort to area, slight redness noted, no swelling or drainage noted to area.

## 2025-06-13 NOTE — CARE PLAN
The patient's goals for the shift include Rest well & safety    The clinical goals for the shift include patients pain will be managed      Problem: Pain - Adult  Goal: Verbalizes/displays adequate comfort level or baseline comfort level  Outcome: Progressing     Problem: Safety - Adult  Goal: Free from fall injury  Outcome: Progressing     Problem: Discharge Planning  Goal: Discharge to home or other facility with appropriate resources  Outcome: Progressing     Problem: Chronic Conditions and Co-morbidities  Goal: Patient's chronic conditions and co-morbidity symptoms are monitored and maintained or improved  Outcome: Progressing     Problem: Nutrition  Goal: Nutrient intake appropriate for maintaining nutritional needs  Outcome: Progressing     Problem: Pain  Goal: Takes deep breaths with improved pain control throughout the shift  Outcome: Progressing  Goal: Turns in bed with improved pain control throughout the shift  Outcome: Progressing  Goal: Walks with improved pain control throughout the shift  Outcome: Progressing  Goal: Performs ADL's with improved pain control throughout shift  Outcome: Progressing  Goal: Participates in PT with improved pain control throughout the shift  Outcome: Progressing  Goal: Free from opioid side effects throughout the shift  Outcome: Progressing  Goal: Free from acute confusion related to pain meds throughout the shift  Outcome: Progressing     Problem: Skin  Goal: Participates in plan/prevention/treatment measures  Outcome: Progressing  Goal: Prevent/manage excess moisture  Outcome: Progressing  Goal: Promote skin healing  Outcome: Progressing  Goal: Decreased wound size/increased tissue granulation at next dressing change  Outcome: Progressing  Goal: Promote/optimize nutrition  Outcome: Progressing     Problem: Fall/Injury  Goal: Not fall by end of shift  Outcome: Progressing  Goal: Be free from injury by end of the shift  Outcome: Progressing

## 2025-06-13 NOTE — SIGNIFICANT EVENT
48 yo woman, ESRD on long term peritoneal and hemodialysis, with associated complications.  She has continued to experience sharp pain at the right groin tunneled HD catheter insertion site, prior to and after the recent guidewire exchange of the catheter on Mon 6/10.    She did receive a full HD session yesterday, however with severe pain.    I discussed her case with Dr. BRITTANY Pedersen who has followed her ESRD course longitudinally and with Ms. Page this afternoon.  She has end-stage vascular access.  Unfortunately there is no definitive alternative site to place a catheter, as many of her previously used sites/veins are occluded.  In the absence of active concern for infection, I do not think that exchange of her existing R groin catheter will address the pain she is experiencing.    I suspect she also has L groin/iliac vein stenosis, and other alternative end-stage access approaches (translumbar/transhepatic) come with risk and associated pain.    As the current right groin catheter is functional, I would recommend maximum medical management and palliation, with this current catheter remaining in place.    IR will continue to be available for consultation/discussion moving forward.

## 2025-06-13 NOTE — PROGRESS NOTES
Batsheva Page is a 49 y.o. female on day 5 of admission presenting with Complication associated with dialysis catheter.      Subjective   Pt seen and examined at bedside. Awake/alert/oriented.  Denies chest pain, shortness of breath, fevers, chills, nausea, or vomiting.  Still having pain to dialysis site, medicated for pain by RN. Lost IV access overnight, pain meds changed to PO however not as effective. Consulted palliative who is making adjustments. Vascular surgery consulted as well.      Objective     Last Recorded Vitals  BP (!) 149/94 (BP Location: Right arm, Patient Position: Sitting)   Pulse 62   Temp 36.3 °C (97.3 °F) (Oral)   Resp 17   Wt 69 kg (152 lb 1.9 oz)   SpO2 95%   Intake/Output last 3 Shifts:    Intake/Output Summary (Last 24 hours) at 6/13/2025 1054  Last data filed at 6/13/2025 0603  Gross per 24 hour   Intake 840 ml   Output 5400 ml   Net -4560 ml       Admission Weight  Weight: 69.4 kg (153 lb) (06/08/25 1152)    Daily Weight  06/10/25 : 69 kg (152 lb 1.9 oz)    Image Results  IR CVC tunneled  Narrative: Interpreted By:  Melvin Medina,   STUDY:  IR CVC TUNNELED;  6/10/2025 1:08 pm      INDICATION:  Signs/Symptoms:Dislodged HD line.      COMPARISON:  None.      ACCESSION NUMBER(S):  [id number]      ORDERING CLINICIAN:  HALLEY CHAMORRO      TECHNIQUE:  INTERVENTIONALIST(S):  Manuel Medina MD      CONSENT:  The patient/patient's POA/next of kin was informed of the nature of  the proposed procedure. The purposes, alternatives, risks, and  benefits were explained and discussed. All questions were answered  and consent was obtained.      RADIATION EXPOSURE:  Fluoroscopy time: 0.7 min  Dose: 3.72 mGy  Dose Area Product (DAP): 1342 mGy*cm^2      SEDATION:  Mac anesthesia was provided by the department of anesthesiology in  maintained throughout the duration of the procedure.      MEDICATION:  None.      TIME OUT:  A time out was performed immediately prior to procedure start  with  the interventional team, correctly identifying the patient name, date  of birth, MRN, procedure, anatomy (including marking of site and  side), patient position, procedure consent form, relevant laboratory  and imaging test results, antibiotic administration, safety  precautions, and procedure-specific equipment needs.      COMPLICATIONS:  No immediate adverse events identified.      FINDINGS:  In the recumbent position, the patient was positioned on the  angiography table. MAC anesthesia was initiated by the Department of  Anesthesiology and maintained throughout the duration of the  procedure. The right inguinal cutaneous tissues and existing catheter  were prepared and draped in usual sterile manner. An 035 Amplatz wire  was placed through 1 of the lumens of the existing catheter. The  existing catheter was removed.  After Lidocaine 1% local anesthesia,  a 14.5 Estonian x 44 cm hemodialysis catheter was then placed with  tract continuity and its central catheter tip(s) to reside at the  cavoatrial junction. A fluoroscopic spot image of the chest was  acquired in the AP projection to confirm optimal location. The  catheter ports were aspirated and flushed without resistance with  normal saline. The catheter ports were then charged with  high-concentration heparin. The external portions of the catheter  were secured with a purse-string 2-0 polypropylene suture and sterile  dressings.      The patient tolerated the procedure without complication.      Impression: 1. Uncomplicated exchange of a tunneled right femoral 14.5 Fr x 44 cm  hemodialysis catheter. The catheter is ready for immediate use.      Performed and dictated at [facility].      MACRO:  None.      Signed by: Melvin Medina 6/10/2025 4:02 PM  Dictation workstation:   [id number]      Physical Exam  Vitals and nursing note reviewed.   Constitutional:       General: She is not in acute distress.     Appearance:  Normal appearance. She is not toxic-appearing.   HENT:      Head: Normocephalic and atraumatic.      Nose: Nose normal.      Mouth/Throat:      Mouth: Mucous membranes are moist.   Eyes:      General: Lids are normal.      Extraocular Movements: Extraocular movements intact.      Conjunctiva/sclera: Conjunctivae normal.   Cardiovascular:      Rate and Rhythm: Normal rate and regular rhythm.   Pulmonary:      Effort: Pulmonary effort is normal.      Breath sounds: Normal breath sounds. No wheezing, rhonchi or rales.   Abdominal:      General: Bowel sounds are normal.      Palpations: Abdomen is soft.      Tenderness: There is no abdominal tenderness.   Musculoskeletal:         General: Normal range of motion.      Cervical back: Normal range of motion and neck supple.   Skin:     General: Skin is warm and dry.      Capillary Refill: Capillary refill takes less than 2 seconds.      Findings: Erythema present.   Neurological:      General: No focal deficit present.      Mental Status: She is alert and oriented to person, place, and time.       Relevant Results  Lab Results   Component Value Date    GLUCOSE 91 06/13/2025    CALCIUM 8.4 (L) 06/13/2025     06/13/2025    K 4.3 06/13/2025    CO2 27 06/13/2025    CL 98 06/13/2025    BUN 28 (H) 06/13/2025    CREATININE 5.48 (H) 06/13/2025      Lab Results   Component Value Date    WBC 4.0 (L) 06/13/2025    HGB 9.5 (L) 06/13/2025    HCT 30.8 (L) 06/13/2025     06/13/2025     (L) 06/13/2025        CBC, BMP, Vital signs reviewed    Assessment and Plan    Infected hemodialysis catheter   per patient HD line sliding noted erythema and purulent drainage  -blood cultures negative to date  - wound culture MRSA  - ID consultation following, appreciate recs.  Plan for Bactrim and vancomycin dwell  - Status post hemodialysis catheter exchange on 6/10/2025.  Now complaining of severe pain to new hemodialysis catheter site.  Interventional radiologist reconsulted.   Appreciate recommendations. Will get palliative care on board and vascular surgery input as well. .     ESRD on hemodialysis  hyperkalemia resolved  - HD-> M/W/F   - nephrology consult placed  - Patient unable to complete full dialysis on 6/10/2025, HD 6/12/25  - Hemodialysis per nephrology     HTN  - Continue antihypertensives  - Monitor blood pressure close     Anemia  - Likely secondary to CKD vs ABNER  - Check iron panel-reviewed, occult stool  - Hemoglobin 9.2-stable  - PPI     Seizure  - resumed home medication  - seizure precaution     CAD  - ASA and statin  - no chest pain currently     Anxiety/depression  - resumed home medications      GI prophylaxis  -PPI     DVT prophylaxis  - subcutaneous heparin     Epistaxis  - Nasal spray  - Monitor H&H  - ENT consult if needed     Plan  Monitor on telemetry  Monitor fluid/electrolytes close  Hemodialysis per nephrology  Continue antibiotics, infectious disease following.  Recommendation for Bactrim and vancomycin dwell  DVT prophylaxis  CBC and BMP in AM    Plan for discharge home when able to tolerate HD     Discussed with palliative, nephrology, ID. Pain medications have been adjusted. Vascular surgery consulted, appreciate recs. IR notified of persistent pain at catheter site as well.      Noelle Doss, ORI-CNP

## 2025-06-13 NOTE — CARE PLAN
The patient's goals for the shift include Rest well & safety    The clinical goals for the shift include Pain Managment, Palliative Consult      Problem: Pain - Adult  Goal: Verbalizes/displays adequate comfort level or baseline comfort level  Outcome: Progressing     Problem: Safety - Adult  Goal: Free from fall injury  Outcome: Progressing     Problem: Discharge Planning  Goal: Discharge to home or other facility with appropriate resources  Outcome: Progressing     Problem: Chronic Conditions and Co-morbidities  Goal: Patient's chronic conditions and co-morbidity symptoms are monitored and maintained or improved  Outcome: Progressing     Problem: Nutrition  Goal: Nutrient intake appropriate for maintaining nutritional needs  Outcome: Progressing     Problem: Pain  Goal: Takes deep breaths with improved pain control throughout the shift  Outcome: Progressing  Goal: Turns in bed with improved pain control throughout the shift  Outcome: Progressing  Goal: Walks with improved pain control throughout the shift  Outcome: Progressing  Goal: Performs ADL's with improved pain control throughout shift  Outcome: Progressing  Goal: Participates in PT with improved pain control throughout the shift  Outcome: Progressing  Goal: Free from opioid side effects throughout the shift  Outcome: Progressing  Goal: Free from acute confusion related to pain meds throughout the shift  Outcome: Progressing     Problem: Skin  Goal: Participates in plan/prevention/treatment measures  Outcome: Progressing  Goal: Prevent/manage excess moisture  Outcome: Progressing  Goal: Promote skin healing  Outcome: Progressing  Goal: Decreased wound size/increased tissue granulation at next dressing change  Outcome: Progressing  Goal: Promote/optimize nutrition  Outcome: Progressing     Problem: Fall/Injury  Goal: Not fall by end of shift  Outcome: Progressing  Goal: Be free from injury by end of the shift  Outcome: Progressing

## 2025-06-13 NOTE — NURSING NOTE
IV RN attempted IV access per request of patient without success. Palliative CNP made aware. Hospitalist made aware.

## 2025-06-13 NOTE — PROGRESS NOTES
Batsheva Page is a 49 y.o. female on day 5 of admission presenting with Complication associated with dialysis catheter.      Subjective   Patient still with significant stabbing pain at the new tunneled dialysis catheter site otherwise no complaints.  She did undergo dialysis yesterday and it appears the catheter itself is working.       Objective          Vitals 24HR  Heart Rate:  [62-70]   Temp:  [36.3 °C (97.3 °F)-36.6 °C (97.9 °F)]   Resp:  [17-18]   BP: (115-200)/(69-98)   SpO2:  [95 %-100 %]       Intake/Output last 3 Shifts:    Intake/Output Summary (Last 24 hours) at 6/13/2025 1246  Last data filed at 6/13/2025 0603  Gross per 24 hour   Intake 840 ml   Output 5400 ml   Net -4560 ml       Physical Exam  Constitutional:       General: She is awake. She is not in acute distress.  Cardiovascular:      Heart sounds:      No friction rub.   Pulmonary:      Effort: Pulmonary effort is normal.      Comments: Mildly diminished breath sounds  Abdominal:      General: Bowel sounds are normal.      Palpations: Abdomen is soft.      Tenderness: There is no guarding or rebound.   Musculoskeletal:      Comments: Mild edema.  Right femoral tunneled dialysis catheter present with mild erythema surrounding, no drainage    Relevant Results  Results for orders placed or performed during the hospital encounter of 06/08/25 (from the past 24 hours)   POCT GLUCOSE   Result Value Ref Range    POCT Glucose 94 74 - 99 mg/dL   POCT GLUCOSE   Result Value Ref Range    POCT Glucose 109 (H) 74 - 99 mg/dL   CBC   Result Value Ref Range    WBC 4.0 (L) 4.4 - 11.3 x10*3/uL    nRBC 0.0 0.0 - 0.0 /100 WBCs    RBC 3.07 (L) 4.00 - 5.20 x10*6/uL    Hemoglobin 9.5 (L) 12.0 - 16.0 g/dL    Hematocrit 30.8 (L) 36.0 - 46.0 %     80 - 100 fL    MCH 30.9 26.0 - 34.0 pg    MCHC 30.8 (L) 32.0 - 36.0 g/dL    RDW 15.8 (H) 11.5 - 14.5 %    Platelets 142 (L) 150 - 450 x10*3/uL   Basic Metabolic Panel   Result Value Ref Range    Glucose 91 74 - 99  mg/dL    Sodium 137 136 - 145 mmol/L    Potassium 4.3 3.5 - 5.3 mmol/L    Chloride 98 98 - 107 mmol/L    Bicarbonate 27 21 - 32 mmol/L    Anion Gap 16 10 - 20 mmol/L    Urea Nitrogen 28 (H) 6 - 23 mg/dL    Creatinine 5.48 (H) 0.50 - 1.05 mg/dL    eGFR 9 (L) >60 mL/min/1.73m*2    Calcium 8.4 (L) 8.6 - 10.3 mg/dL   POCT GLUCOSE   Result Value Ref Range    POCT Glucose 130 (H) 74 - 99 mg/dL   POCT GLUCOSE   Result Value Ref Range    POCT Glucose 106 (H) 74 - 99 mg/dL                 Assessment & Plan  Complication associated with dialysis catheter    End-stage renal disease on hemodialysis (Multi)    Hyperkalemia    End-stage renal disease on hemodialysis  Hyperkalemia, resolved  Hypertension  Anemia     Plan: Patient is s/p tunneled dialysis catheter exchange but still with significant pain in the dialysis catheter site which interventional radiology evaluated and was notified about again today, awaiting further input.  Vascular surgery is also now on consult.  Added amlodipine 5 mg once a day for better blood pressure control.  Started Renvela for hyperphosphatemia.  Ensure renal diet.    Vu Pedersen MD

## 2025-06-13 NOTE — PROGRESS NOTES
"INFECTIOUS DISEASES PROGRESS NOTE    Consulted / following patient for:  Displaced and infected tunneled dialysis catheter    Subjective   Interval History:   No systemic symptoms  Complains of persistent pain at the site of the replaced right femoral dialysis catheter, exacerbated by active dialysis.  Patient states that she has never had this kind of discomfort from a femoral dialysis catheter previously    Objective   PHYSICAL EXAMINATION  Vital signs:  Visit Vitals  BP (!) 149/94 (BP Location: Right arm, Patient Position: Sitting)   Pulse 62   Temp 36.3 °C (97.3 °F) (Oral)   Resp 17      General: Alert, not toxic  Lungs:  Clear to auscultation  Heart:  S1, S2 normal, crisp valve sounds, no pathologic murmur appreciated  Abdomen:  Soft, nontender. No palpable organs or masses  Extremities: New tunneled right femoral dialysis catheter in place.  No suppuration or localized erythema or suppuration.  Very tender to palpation around the catheter    Relevant Results  WBC: 4000    Microbiology:  Blood (6/8): Negative X2  Right femoral wound (6/9): MRSA, susceptible to Bactrim        ASSESSMENT:  Rule out dialysis catheter infection  Catheter became partially dislodged and developed surrounding erythema.  Blood cultures negative to date.  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  Does not appear septic.  Wound culture growing MRSA, as expected.  Catheter has been exchanged but she is complaining of significant pain -- awaiting further reassessment by IR (at this point I do not see any notes in the chart)        PLANS:  -   Continue TMP-SMX for 7 more days (6/20)  -   Await reassessment of dialysis catheter by IR physician, any further suggestions?  -   Continue \"vancomycin dwell\" after each dialysis    WILL SIGN OFF.  PLEASE RE-CONSULT PRN.  THANK YOU.      Adama Soto MD  [facility]  Office:  [phone]  "

## 2025-06-13 NOTE — CONSULTS
Consults    Reason For Consult  Reason for Consult: communication / medical decision making and symptom management     History Of Present Illness  Batsheva Page is a 49 y.o. 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     The patient came in with an infected dialysis cath.  States that her catheter started to slip out of the R femoral site.  She also reported increasing pain and tenderness redness purulent drainage at the site.  Due to several infections in the past the patient presented to the emergency room to have it checked.  In the Emergency room the patient was given Zosyn and Vanco.  Blood cultures were drawn.  Lactate was within normal limits. The catheter was replaced on 6/10/2025. The patient continues to report severe burning, stabbing pain a 10/10 at the site.       Symptoms (0 - 10, Best to Worst)  [place] Symptom Assessment System  0-10 (Numeric) Pain Score: 10 - Worst possible pain    BM in last 48 hours? yes    Emotional/Psychological/Spiritual Needs  Over the past two weeks, how often has the patient been bothered by having little interest or pleasure in doing things?  occurrence (last two weeks): not at all    Over the past two weeks, how often has the patient been bothered by feeling down, depressed, or hopeless?  occurrence (last two weeks): not at all    Screening for spiritual needs?  Yes  Sikhism and importance of Sikh: Sabianism   Source for spiritual support/meaning:     Spiritual Hx:  Are you spiritual or Presybeterian? No  What’s your lidya background? Sabianism   During difficult times in your life, what have you relied on  for strength? Declines spiritual care at this time       Serious Illness Conversation  What is your understanding now of where you are with your illness:  full understanding   How much information about what is likely to be ahead with your illness would you like from me: full   What are your most important goals if your health situation worsens:  evaluating  What are your biggest fears and worries about the future with your health:  how much longer can she hold on  What gives you strength as you think about the future with your illness:  her support system  What abilities are so critical to your life that you can’t imagine living without them:  evaluating   If you become sicker, how much are you willing to go through for the possibility of gaining more time:  ongoing discussion  How much does your family know about your priorities and wishes:  full     Personal/Social History  She reports that she has never smoked. She has never used smokeless tobacco. She reports that she does not drink alcohol and does not use drugs.    Functional Status        Caregiving/Caregiver Support  Does the patient require assistance in some or all components of his care, including coordination of medical care? No  If Yes, which person serves that role?    Caregiver emotional or practical needs:      Past Medical History  She has a past medical history of Aortic valve replaced (2022), CHF (congestive heart failure), Chronic pain, Coronary artery disease, Disease of thyroid gland, ESRD (end stage renal disease) (Multi), H/O mitral valve replacement (2013), Heart disease, History of transfusion, Hypertension, Mitral valve regurgitation, Seizures (Multi), and Stroke (Multi).    Surgical History  She has a past surgical history that includes IR CVC tunneled (09/09/2022); IR CVC tunneled (12/28/2022); US guided percutaneous placement (07/14/2022); Parathyroidectomy; Coronary artery bypass graft; Aortic valve replacement (09/26/2022); Mitral  "valve replacement (09/26/2022); Mitral valve repair (2013); Tricuspid valvuloplasty (2013); IR CVC (01/12/2024); IR CVC (05/07/2024); and IR CVC (08/20/2024).     Family History  Family History[1]  Allergies  Kayexalate, Metoclopramide hcl, Prochlorperazine, Zofran [ondansetron hcl], and Ondansetron    Review of Systems   Constitutional:  Positive for fatigue.   HENT: Negative.     Respiratory:  Negative for chest tightness and shortness of breath.    Cardiovascular:  Positive for leg swelling. Negative for chest pain.   Gastrointestinal: Negative.    Musculoskeletal:  Positive for myalgias.   Skin:  Positive for color change.        Cellulitis    Neurological:  Negative for dizziness and light-headedness.        Physical Exam  Vitals and nursing note reviewed.   Constitutional:       General: She is not in acute distress.  HENT:      Head: Normocephalic and atraumatic.      Mouth/Throat:      Mouth: Mucous membranes are moist.   Eyes:      Pupils: Pupils are equal, round, and reactive to light.   Cardiovascular:      Rate and Rhythm: Normal rate and regular rhythm.      Heart sounds: Murmur heard.   Pulmonary:      Effort: Pulmonary effort is normal. No respiratory distress.      Breath sounds: Normal breath sounds.   Abdominal:      General: Bowel sounds are normal.   Genitourinary:     Comments: deferred  Musculoskeletal:      Cervical back: Neck supple. No rigidity.      Right lower leg: Edema present.      Comments: RLE cellulitis around the infection site in the upper R leg. There is warmth, tenderness, and swelling present   Skin:     General: Skin is dry.      Capillary Refill: Capillary refill takes 2 to 3 seconds.   Neurological:      Mental Status: She is oriented to person, place, and time.      Motor: Weakness present.         Last Recorded Vitals  Blood pressure (!) 149/94, pulse 62, temperature 36.3 °C (97.3 °F), temperature source Oral, resp. rate 17, height 1.651 m (5' 5\"), weight 69 kg (152 lb 1.9 " oz), SpO2 95%.    Relevant Results  Results for orders placed or performed during the hospital encounter of 06/08/25 (from the past 24 hours)   POCT GLUCOSE   Result Value Ref Range    POCT Glucose 94 74 - 99 mg/dL   POCT GLUCOSE   Result Value Ref Range    POCT Glucose 109 (H) 74 - 99 mg/dL   CBC   Result Value Ref Range    WBC 4.0 (L) 4.4 - 11.3 x10*3/uL    nRBC 0.0 0.0 - 0.0 /100 WBCs    RBC 3.07 (L) 4.00 - 5.20 x10*6/uL    Hemoglobin 9.5 (L) 12.0 - 16.0 g/dL    Hematocrit 30.8 (L) 36.0 - 46.0 %     80 - 100 fL    MCH 30.9 26.0 - 34.0 pg    MCHC 30.8 (L) 32.0 - 36.0 g/dL    RDW 15.8 (H) 11.5 - 14.5 %    Platelets 142 (L) 150 - 450 x10*3/uL   Basic Metabolic Panel   Result Value Ref Range    Glucose 91 74 - 99 mg/dL    Sodium 137 136 - 145 mmol/L    Potassium 4.3 3.5 - 5.3 mmol/L    Chloride 98 98 - 107 mmol/L    Bicarbonate 27 21 - 32 mmol/L    Anion Gap 16 10 - 20 mmol/L    Urea Nitrogen 28 (H) 6 - 23 mg/dL    Creatinine 5.48 (H) 0.50 - 1.05 mg/dL    eGFR 9 (L) >60 mL/min/1.73m*2    Calcium 8.4 (L) 8.6 - 10.3 mg/dL   POCT GLUCOSE   Result Value Ref Range    POCT Glucose 130 (H) 74 - 99 mg/dL   POCT GLUCOSE   Result Value Ref Range    POCT Glucose 106 (H) 74 - 99 mg/dL      IR CVC tunneled  Result Date: 6/10/2025  Interpreted By:  Melvin Medina, STUDY: IR CVC TUNNELED;  6/10/2025 1:08 pm   INDICATION: Signs/Symptoms:Dislodged HD line.   COMPARISON: None.   ACCESSION NUMBER(S): [id number]   ORDERING CLINICIAN: HALLEY CHAMORRO   TECHNIQUE: INTERVENTIONALIST(S): Manuel Medina MD   CONSENT: The patient/patient's POA/next of kin was informed of the nature of the proposed procedure. The purposes, alternatives, risks, and benefits were explained and discussed. All questions were answered and consent was obtained.   RADIATION EXPOSURE: Fluoroscopy time: 0.7 min Dose: 3.72 mGy Dose Area Product (DAP): 1342 mGy*cm^2   SEDATION: Mac anesthesia was provided by the department of anesthesiology in [facility]  throughout the duration of the procedure.   MEDICATION: None.   TIME OUT: A time out was performed immediately prior to procedure start with the interventional team, correctly identifying the patient name, date of birth, MRN, procedure, anatomy (including marking of site and side), patient position, procedure consent form, relevant laboratory and imaging test results, antibiotic administration, safety precautions, and procedure-specific equipment needs.   COMPLICATIONS: No immediate adverse events identified.   FINDINGS: In the recumbent position, the patient was positioned on the angiography table. MAC anesthesia was initiated by the Department of Anesthesiology and maintained throughout the duration of the procedure. The right inguinal cutaneous tissues and existing catheter were prepared and draped in usual sterile manner. An 035 Amplatz wire was placed through 1 of the lumens of the existing catheter. The existing catheter was removed.  After Lidocaine 1% local anesthesia, a 14.5 Slovak x 44 cm hemodialysis catheter was then placed with tract continuity and its central catheter tip(s) to reside at the cavoatrial junction. A fluoroscopic spot image of the chest was acquired in the AP projection to confirm optimal location. The catheter ports were aspirated and flushed without resistance with normal saline. The catheter ports were then charged with high-concentration heparin. The external portions of the catheter were secured with a purse-string 2-0 polypropylene suture and sterile dressings.   The patient tolerated the procedure without complication.       1. Uncomplicated exchange of a tunneled right femoral 14.5 Fr x 44 cm hemodialysis catheter. The catheter is ready for immediate use.   Performed and dictated at [facility].   MACRO: None.   Signed by: Melvin eMdina 6/10/2025 4:02 PM Dictation workstation:   [id number]     Scheduled medications  Scheduled  Medications[2]  Continuous medications  Continuous Medications[3]  PRN medications  PRN Medications[4]      Assessment/Plan   IMP:    Infected hemodialysis cath  ESRD on Hemodialysis (limited access - now with a fem tunneled cath)  CAD  HTN  Anemia  Pain   Anxiety/ Depression   Palliative Care   DNR CCA DNI   The patient has decision making capacity  Has one child, Xiao, who is her only child and the patient is .   She does have a significant other - Chava  MICAH in EPIC - POA-HC is incorrect (as it lists her daugther as the patient - I reached out to case management to assist with completing a new one.     6/13/2025  Goals of care discussion with the patient.  We talked about CODE STATUS and this was changed in epic per our conversation.  Discussion regarding goals of care.  The patient is aware of her current condition and trajectory.  We did talk about how long the patient has been on dialysis (21 years) and quality of life.  She stated that she does value quality of life.  She has a stated that being on dialysis for 21 years is very hard.  We asked about systems of support that she stated that her daughter Xiao and sig gabino Larsen is her support system.  He denies depression, but does endorse anxiety.  She stated that she does not know how much longer she can continue to do dialysis given her current quality of life.  She states that the only reason for her to continue is for her daughter and significant other.  We did talk about lack of dialysis access.  The patient states she does understand that her access is limited-and she is waiting for the decision to be made for her to stop dialysis versus her making a decision to stop dialysis.  She states that not having access would be bittersweet-only had a relief that she does not have to make a decision about completing dialysis-and on the other hand knowing that not receiving dialysis is life limiting.    I did adjust her pain regimen -the patient states  that she is having pain in the RLE that is a 10 out of 10 and sharp stabbing and burning.  Patient also has lost IV access.    I scheduled Tylenol 650mg every 6 hours  Dilaudid 1 mg as scheduled every 6 hours  Robaxin 500 mg scheduled every 8 hours  Oxy IR 5 mg as needed every 4 hours  Lidocaine 5% topical solution as needed every 4 hours    Patient is getting Benadryl as needed 50 mg for anxiety and Lyrica for restless leg syndrome.    PDMP was reviewed, MME was calculated to with the patient was getting in the hospital as well as with the patient was getting outpatient.  A similar regimen was prescribed with breakthrough medications.    Will monitor for effectiveness.    Symptom Management  Pain: yes   Medications recommended for pain?  Yes  Tiredness: no  Nausea: no  Depression: no  Anxiety: yes   Drowsiness: no  Appetite: fair   Wellbeing: MANJINDER   Dyspnea: no  Intervention recommended for dyspnea?  no  Other:   Intervention recommended for constipation?  No    Patient/proxy preference for information  Prefers full information    Goals of Care  Treatment model of care    I spent 60 minutes in the professional and overall care of this patient.  ACP time was 20 minutes.      ORI Goff-SAL       [1]   Family History  Problem Relation Name Age of Onset    No Known Problems Mother      No Known Problems Father     [2] acetaminophen, 650 mg, oral, q6h  amLODIPine, 5 mg, oral, Daily  aspirin, 81 mg, oral, Daily  atorvastatin, 40 mg, oral, Nightly  calcitriol, 0.5 mcg, oral, Daily  carvedilol, 12.5 mg, oral, BID  cloNIDine, 0.3 mg, oral, q8h KIMBERLY  epoetin brandy or biosimilar, 6,000 Units, subcutaneous, Once per day on Monday Wednesday Friday  ergocalciferol, 1.25 mg, oral, Every Sunday  heparin, 5,000 Units, subcutaneous, q12h  heparin flush, 5 mL, intra-catheter, Once  HYDROmorphone, 1 mg, oral, q6h  levETIRAcetam, 750 mg, oral, Nightly  lidocaine, 0.1 mL, subcutaneous, Once  methocarbamol, 500 mg, oral, q8h  KIMBERLY  pantoprazole, 40 mg, oral, Daily before breakfast  pregabalin, 75 mg, oral, Daily  sevelamer carbonate, 800 mg, oral, TID  sodium chloride, 1 spray, Each Nostril, Before meals & nightly  sulfamethoxazole-trimethoprim, 1 tablet, oral, BID  vancomycin 5 mg/mL + heparin 2500 units/mL in NS lock, 2 mL, intra-catheter, After Dialysis  vancomycin 5 mg/mL + heparin 2500 units/mL in NS lock, 2 mL, intra-catheter, After Dialysis  [3]    [4] PRN medications: dextrose, dextrose, diphenhydrAMINE, glucagon, glucagon, lidocaine, naloxone, oxyCODONE

## 2025-06-13 NOTE — PROGRESS NOTES
Patient not medically clear. Patient having pain in dialysis access. Palliative consulted. At this time the discharge plan is for patient to return home with no skilled services. Will follow.      06/13/25 1054   Discharge Planning   Home or Post Acute Services None   Expected Discharge Disposition Home   Does the patient need discharge transport arranged? No

## 2025-06-14 ENCOUNTER — APPOINTMENT (OUTPATIENT)
Dept: DIALYSIS | Facility: HOSPITAL | Age: 50
End: 2025-06-14
Payer: MEDICARE

## 2025-06-14 PROBLEM — Z51.5 PALLIATIVE CARE ENCOUNTER: Status: ACTIVE | Noted: 2025-06-14

## 2025-06-14 LAB
ALBUMIN SERPL BCP-MCNC: 3.6 G/DL (ref 3.4–5)
ANION GAP SERPL CALCULATED.3IONS-SCNC: 18 MMOL/L (ref 10–20)
BUN SERPL-MCNC: 42 MG/DL (ref 6–23)
CALCIUM SERPL-MCNC: 7.6 MG/DL (ref 8.6–10.3)
CHLORIDE SERPL-SCNC: 97 MMOL/L (ref 98–107)
CO2 SERPL-SCNC: 26 MMOL/L (ref 21–32)
CREAT SERPL-MCNC: 7.05 MG/DL (ref 0.5–1.05)
EGFRCR SERPLBLD CKD-EPI 2021: 7 ML/MIN/1.73M*2
ERYTHROCYTE [DISTWIDTH] IN BLOOD BY AUTOMATED COUNT: 15.8 % (ref 11.5–14.5)
GLUCOSE BLD MANUAL STRIP-MCNC: 102 MG/DL (ref 74–99)
GLUCOSE BLD MANUAL STRIP-MCNC: 137 MG/DL (ref 74–99)
GLUCOSE BLD MANUAL STRIP-MCNC: 93 MG/DL (ref 74–99)
GLUCOSE BLD MANUAL STRIP-MCNC: 97 MG/DL (ref 74–99)
GLUCOSE SERPL-MCNC: 83 MG/DL (ref 74–99)
HCT VFR BLD AUTO: 28.5 % (ref 36–46)
HGB BLD-MCNC: 9.1 G/DL (ref 12–16)
MCH RBC QN AUTO: 30.8 PG (ref 26–34)
MCHC RBC AUTO-ENTMCNC: 31.9 G/DL (ref 32–36)
MCV RBC AUTO: 97 FL (ref 80–100)
NRBC BLD-RTO: 0 /100 WBCS (ref 0–0)
PHOSPHATE SERPL-MCNC: 5.2 MG/DL (ref 2.5–4.9)
PLATELET # BLD AUTO: 153 X10*3/UL (ref 150–450)
POTASSIUM SERPL-SCNC: 4.6 MMOL/L (ref 3.5–5.3)
RBC # BLD AUTO: 2.95 X10*6/UL (ref 4–5.2)
SODIUM SERPL-SCNC: 136 MMOL/L (ref 136–145)
WBC # BLD AUTO: 4.7 X10*3/UL (ref 4.4–11.3)

## 2025-06-14 PROCEDURE — 99233 SBSQ HOSP IP/OBS HIGH 50: CPT

## 2025-06-14 PROCEDURE — 2500000004 HC RX 250 GENERAL PHARMACY W/ HCPCS (ALT 636 FOR OP/ED): Performed by: INTERNAL MEDICINE

## 2025-06-14 PROCEDURE — 2500000001 HC RX 250 WO HCPCS SELF ADMINISTERED DRUGS (ALT 637 FOR MEDICARE OP): Performed by: NURSE PRACTITIONER

## 2025-06-14 PROCEDURE — 99223 1ST HOSP IP/OBS HIGH 75: CPT | Performed by: NURSE PRACTITIONER

## 2025-06-14 PROCEDURE — 80069 RENAL FUNCTION PANEL: CPT | Performed by: INTERNAL MEDICINE

## 2025-06-14 PROCEDURE — 36415 COLL VENOUS BLD VENIPUNCTURE: CPT | Performed by: INTERNAL MEDICINE

## 2025-06-14 PROCEDURE — 2500000004 HC RX 250 GENERAL PHARMACY W/ HCPCS (ALT 636 FOR OP/ED): Performed by: NURSE PRACTITIONER

## 2025-06-14 PROCEDURE — 2500000001 HC RX 250 WO HCPCS SELF ADMINISTERED DRUGS (ALT 637 FOR MEDICARE OP): Performed by: INTERNAL MEDICINE

## 2025-06-14 PROCEDURE — 2500000001 HC RX 250 WO HCPCS SELF ADMINISTERED DRUGS (ALT 637 FOR MEDICARE OP)

## 2025-06-14 PROCEDURE — 2500000002 HC RX 250 W HCPCS SELF ADMINISTERED DRUGS (ALT 637 FOR MEDICARE OP, ALT 636 FOR OP/ED): Performed by: INTERNAL MEDICINE

## 2025-06-14 PROCEDURE — 6350000001 HC RX 635 EPOETIN >10,000 UNITS: Performed by: INTERNAL MEDICINE

## 2025-06-14 PROCEDURE — A4217 STERILE WATER/SALINE, 500 ML: HCPCS | Performed by: INTERNAL MEDICINE

## 2025-06-14 PROCEDURE — 8010000001 HC DIALYSIS - HEMODIALYSIS PER DAY

## 2025-06-14 PROCEDURE — 99233 SBSQ HOSP IP/OBS HIGH 50: CPT | Performed by: NURSE PRACTITIONER

## 2025-06-14 PROCEDURE — 1200000002 HC GENERAL ROOM WITH TELEMETRY DAILY

## 2025-06-14 PROCEDURE — 82947 ASSAY GLUCOSE BLOOD QUANT: CPT

## 2025-06-14 PROCEDURE — 2500000004 HC RX 250 GENERAL PHARMACY W/ HCPCS (ALT 636 FOR OP/ED)

## 2025-06-14 PROCEDURE — 85027 COMPLETE CBC AUTOMATED: CPT | Performed by: INTERNAL MEDICINE

## 2025-06-14 RX ORDER — DIPHENHYDRAMINE HYDROCHLORIDE 50 MG/ML
50 INJECTION, SOLUTION INTRAMUSCULAR; INTRAVENOUS ONCE
Status: COMPLETED | OUTPATIENT
Start: 2025-06-14 | End: 2025-06-14

## 2025-06-14 RX ORDER — DIPHENHYDRAMINE HYDROCHLORIDE 50 MG/ML
50 INJECTION, SOLUTION INTRAMUSCULAR; INTRAVENOUS ONCE
Status: DISCONTINUED | OUTPATIENT
Start: 2025-06-14 | End: 2025-06-14

## 2025-06-14 RX ORDER — HYDROMORPHONE HYDROCHLORIDE 1 MG/ML
0.6 INJECTION, SOLUTION INTRAMUSCULAR; INTRAVENOUS; SUBCUTANEOUS ONCE
Status: DISCONTINUED | OUTPATIENT
Start: 2025-06-14 | End: 2025-06-14

## 2025-06-14 RX ORDER — LORAZEPAM 0.5 MG/1
0.5 TABLET ORAL EVERY 6 HOURS PRN
Status: DISCONTINUED | OUTPATIENT
Start: 2025-06-14 | End: 2025-06-19 | Stop reason: HOSPADM

## 2025-06-14 RX ORDER — DIPHENHYDRAMINE HYDROCHLORIDE 50 MG/ML
25 INJECTION, SOLUTION INTRAMUSCULAR; INTRAVENOUS EVERY 6 HOURS PRN
Status: DISCONTINUED | OUTPATIENT
Start: 2025-06-14 | End: 2025-06-15

## 2025-06-14 RX ADMIN — CLONIDINE HYDROCHLORIDE 0.3 MG: 0.3 TABLET ORAL at 21:27

## 2025-06-14 RX ADMIN — ACETAMINOPHEN 650 MG: 325 TABLET ORAL at 00:29

## 2025-06-14 RX ADMIN — DIPHENHYDRAMINE HYDROCHLORIDE 25 MG: 50 INJECTION, SOLUTION INTRAMUSCULAR; INTRAVENOUS at 20:19

## 2025-06-14 RX ADMIN — SALINE NASAL SPRAY 1 SPRAY: 1.5 SOLUTION NASAL at 20:21

## 2025-06-14 RX ADMIN — HYDROMORPHONE HYDROCHLORIDE 0.4 MG: 1 INJECTION, SOLUTION INTRAMUSCULAR; INTRAVENOUS; SUBCUTANEOUS at 13:09

## 2025-06-14 RX ADMIN — CARVEDILOL 12.5 MG: 12.5 TABLET, FILM COATED ORAL at 20:21

## 2025-06-14 RX ADMIN — DIPHENHYDRAMINE HYDROCHLORIDE 50 MG: 50 INJECTION, SOLUTION INTRAMUSCULAR; INTRAVENOUS at 15:03

## 2025-06-14 RX ADMIN — LORAZEPAM 0.5 MG: 0.5 TABLET ORAL at 15:03

## 2025-06-14 RX ADMIN — HYDROMORPHONE HYDROCHLORIDE 0.4 MG: 1 INJECTION, SOLUTION INTRAMUSCULAR; INTRAVENOUS; SUBCUTANEOUS at 20:19

## 2025-06-14 RX ADMIN — HYDROMORPHONE HYDROCHLORIDE 1 MG: 2 TABLET ORAL at 06:08

## 2025-06-14 RX ADMIN — METHOCARBAMOL TABLETS 500 MG: 500 TABLET, COATED ORAL at 16:57

## 2025-06-14 RX ADMIN — VANCOMYCIN HYDROCHLORIDE 2 ML: 500 INJECTION, POWDER, LYOPHILIZED, FOR SOLUTION INTRAVENOUS at 15:43

## 2025-06-14 RX ADMIN — HYDROMORPHONE HYDROCHLORIDE 0.4 MG: 0.5 INJECTION, SOLUTION INTRAMUSCULAR; INTRAVENOUS; SUBCUTANEOUS at 15:42

## 2025-06-14 RX ADMIN — METHOCARBAMOL TABLETS 500 MG: 500 TABLET, COATED ORAL at 06:08

## 2025-06-14 RX ADMIN — OXYCODONE HYDROCHLORIDE 5 MG: 5 TABLET ORAL at 03:01

## 2025-06-14 RX ADMIN — HYDROMORPHONE HYDROCHLORIDE 0.4 MG: 1 INJECTION, SOLUTION INTRAMUSCULAR; INTRAVENOUS; SUBCUTANEOUS at 23:21

## 2025-06-14 RX ADMIN — AMLODIPINE BESYLATE 5 MG: 5 TABLET ORAL at 10:04

## 2025-06-14 RX ADMIN — SULFAMETHOXAZOLE AND TRIMETHOPRIM 1 TABLET: 400; 80 TABLET ORAL at 20:20

## 2025-06-14 RX ADMIN — ACETAMINOPHEN 650 MG: 325 TABLET ORAL at 23:21

## 2025-06-14 RX ADMIN — HYDROMORPHONE HYDROCHLORIDE 1 MG: 2 TABLET ORAL at 00:29

## 2025-06-14 RX ADMIN — CLONIDINE HYDROCHLORIDE 0.3 MG: 0.3 TABLET ORAL at 06:08

## 2025-06-14 RX ADMIN — CARVEDILOL 12.5 MG: 12.5 TABLET, FILM COATED ORAL at 10:04

## 2025-06-14 RX ADMIN — CLONIDINE HYDROCHLORIDE 0.3 MG: 0.3 TABLET ORAL at 13:55

## 2025-06-14 RX ADMIN — ACETAMINOPHEN 650 MG: 325 TABLET ORAL at 06:08

## 2025-06-14 RX ADMIN — METHOCARBAMOL TABLETS 500 MG: 500 TABLET, COATED ORAL at 21:27

## 2025-06-14 RX ADMIN — EPOETIN ALFA-EPBX 6000 UNITS: 20000 INJECTION, SOLUTION INTRAVENOUS; SUBCUTANEOUS at 15:43

## 2025-06-14 ASSESSMENT — COGNITIVE AND FUNCTIONAL STATUS - GENERAL
MOBILITY SCORE: 24
MOBILITY SCORE: 24
DAILY ACTIVITIY SCORE: 24

## 2025-06-14 ASSESSMENT — PAIN - FUNCTIONAL ASSESSMENT
PAIN_FUNCTIONAL_ASSESSMENT: 0-10

## 2025-06-14 ASSESSMENT — PAIN DESCRIPTION - LOCATION
LOCATION: GROIN

## 2025-06-14 ASSESSMENT — PAIN SCALES - GENERAL
PAINLEVEL_OUTOF10: 10 - WORST POSSIBLE PAIN
PAINLEVEL_OUTOF10: 10 - WORST POSSIBLE PAIN
PAINLEVEL_OUTOF10: 9
PAINLEVEL_OUTOF10: 8
PAINLEVEL_OUTOF10: 9
PAINLEVEL_OUTOF10: 10 - WORST POSSIBLE PAIN
PAINLEVEL_OUTOF10: 7
PAINLEVEL_OUTOF10: 10 - WORST POSSIBLE PAIN

## 2025-06-14 ASSESSMENT — PAIN DESCRIPTION - DESCRIPTORS
DESCRIPTORS: ACHING
DESCRIPTORS: ACHING;STABBING
DESCRIPTORS: ACHING
DESCRIPTORS: ACHING

## 2025-06-14 ASSESSMENT — PAIN DESCRIPTION - ORIENTATION
ORIENTATION: RIGHT

## 2025-06-14 NOTE — ASSESSMENT & PLAN NOTE
Infected hemodialysis cath  Tunneled cath was exchanged by IR on 6/10.   Having significant pain which is worse than prior - per IR there are no definitive alternative sites to place a catheter, as many of her previously used sites/veins are occluded.   Site, tender, swollen, and warm   Vascular consulted - no further options vascular wise regarding fistula or graft placement.  Blood cultures and Lactate WNL  Wound culture with MRSA   Patient on Vanco - ID following     ESRD on Hemodialysis (limited access - now with a fem tunneled cath)  Dialysis MWF   The access line was clear  The patient has end stage dialysis access  The goal is to limit the pain in the site as another exchange of the site could risk current access         CAD/HTN  Medication management   Nephrology titrating BP meds for better control      Anemia  Of Chronic disease  Monitor H/H     Pain  From dialysis cath  RLS - this is managed with Lyrica and Benadryl at home  Scheduled Tylenol 650mg every 6 hours  Dilaudid 1 mg as scheduled every 6 hours  Robaxin 500 mg scheduled every 8 hours  Oxy IR 5 mg as needed every 4 hours  Lidocaine 5% topical solution as needed every 4 hours    The patient has been requesting IV Dilaudid and IV Benadryl during dialysis for severe pain  The patient does not have IV access        Anxiety/ Depression  As needed Ativan     Palliative Care   DNR CCA DNI   The patient has decision making capacity  Has one child, Xiao, who is her only child and the patient is .   She does have a significant other - Chava OBRIEN in EPIC - POA-HC is incorrect (as it lists her daugther as the patient - I reached out to case management to assist with completing a new one.      6/14/2025  I did talk to the patient about adjusting her pain medications today.  She stated that she just wanted IV medications for pain during dialysis and did not want the rest of her pain medication adjusted today.  She states that her dialysis cath is not  right.  We did talk alternate options that were given to her such as through the liver or the heart, and the patient is not agreeable to this.  The patient is very somber today due to the news she was given yesterday about there being very limited options for a new dialysis site.  She states that she is not ready to die, and she really needs to think about her options moving forward.  She states she just wants to leave and go home.  We did talk about pain control with leaving here.  We also discussed palliative care with [facility] who can assist with pain control in the outpatient setting.  She states that she needs to think about this.  I did give her a one-time dose of IV Dilaudid during dialysis, as the patient has had this regimen previously this week.  I will follow-up tomorrow to further reassess goals of care, discharge planning, and pain control.  I did discuss this with the attending team.    6/13/2025  Goals of care discussion with the patient.  We talked about CODE STATUS and this was changed in epic per our conversation.  Discussion regarding goals of care.  The patient is aware of her current condition and trajectory.  We did talk about how long the patient has been on dialysis (21 years) and quality of life.  She stated that she does value quality of life.  She has a stated that being on dialysis for 21 years is very hard.  We asked about systems of support that she stated that her daughter Xiao and sig jenelleh Chava is her support system.  He denies depression, but does endorse anxiety.  She stated that she does not know how much longer she can continue to do dialysis given her current quality of life.  She states that the only reason for her to continue is for her daughter and significant other.  We did talk about lack of dialysis access.  The patient states she does understand that her access is limited-and she is waiting for the decision to be made for her to stop dialysis versus her making a  decision to stop dialysis.  She states that not having access would be bittersweet-only had a relief that she does not have to make a decision about completing dialysis-and on the other hand knowing that not receiving dialysis is life limiting.     I did adjust her pain regimen -the patient states that she is having pain in the RLE that is a 10 out of 10 and sharp stabbing and burning.  Patient also has lost IV access.     I scheduled Tylenol 650mg every 6 hours  Dilaudid 1 mg as scheduled every 6 hours  Robaxin 500 mg scheduled every 8 hours  Oxy IR 5 mg as needed every 4 hours  Lidocaine 5% topical solution as needed every 4 hours     Patient is getting Benadryl as needed 50 mg for anxiety and Lyrica for restless leg syndrome.     PDMP was reviewed, MME was calculated to with the patient was getting in the hospital as well as with the patient was getting outpatient.  A similar regimen was prescribed with breakthrough medications.     Will monitor for effectiveness.     Update: Spoke to the patient regarding her conversation with IR.  She was told by IR that she does not have many options for dialysis access.  We did explore this.  She states that she was preparing for this in the last couple of years.  She states that this would possibly be a relief as she does not have to make a decision.  We talked about the option of continuing dialysis with her current lying which is causing her pain-and aiming for better pain control with outpatient palliative care.  We also discussed stopping dialysis and implementing comfort measures.  She stated that this is a hard decision, I did validate this.  I did ask if we could bring her partner and daughter in to discuss this with her.  She stated that her partner was coming in tonight after work, and she would discuss her options.  I did update the primary team.  We will continue to follow.

## 2025-06-14 NOTE — CARE PLAN
The patient's goals for the shift include Rest well & safety    The clinical goals for the shift include Pain Managment, Palliative Consult    Over the shift, the patient did not make progress toward the following goals. Barriers to progression include . Recommendations to address these barriers include .  Problem: Pain - Adult  Goal: Verbalizes/displays adequate comfort level or baseline comfort level  Outcome: Progressing     Problem: Safety - Adult  Goal: Free from fall injury  Outcome: Progressing

## 2025-06-14 NOTE — PRE-PROCEDURE NOTE
Report from Sending RN:    Report From: Ivory Sim RN  Recent Surgery of Procedure: No  Baseline Level of Consciousness (LOC): x4  Oxygen Use: No  Type: na  Diabetic: No  Last BP Med Given Day of Dialysis: see mar  Last Pain Med Given: see mar  Lab Tests to be Obtained with Dialysis: No  Blood Transfusion to be Given During Dialysis: No  Available IV Access: Yno  Medications to be Administered During Dialysis: No  Continuous IV Infusion Running: No  Restraints on Currently or in the Last 24 Hours: No  Hand-Off Communication: ready for hd  Dialysis Catheter Dressing: will assess upon arrival  Last Dressing Change: will assess upon arrival

## 2025-06-14 NOTE — CARE PLAN
Problem: Pain - Adult  Goal: Verbalizes/displays adequate comfort level or baseline comfort level  Outcome: Progressing   The patient's goals for the shift include Rest well & safety    The clinical goals for the shift include pain will be maintained throughout shift

## 2025-06-14 NOTE — PROGRESS NOTES
Batsheva Page is a 49 y.o. female on day 6 of admission presenting with Complication associated with dialysis catheter.      Subjective   Still reports significant pain in her dialysis catheter site but was seen on dialysis and tolerating treatment, no other complaints.        Objective          Vitals 24HR  Heart Rate:  [58-73]   Temp:  [36.2 °C (97.1 °F)-36.6 °C (97.9 °F)]   Resp:  [17-18]   BP: (140-210)/()   SpO2:  [95 %-98 %]       Intake/Output last 3 Shifts:    Intake/Output Summary (Last 24 hours) at 6/14/2025 1628  Last data filed at 6/14/2025 1554  Gross per 24 hour   Intake 1600 ml   Output 5800 ml   Net -4200 ml       Physical Exam  Constitutional:       General: She is awake. She is not in acute distress.  Cardiovascular:      Heart sounds:      No friction rub.   Pulmonary:      Effort: Pulmonary effort is normal.      Comments: Mildly diminished breath sounds L lung field  Abdominal:      General: Bowel sounds are normal.      Palpations: Abdomen is soft.      Tenderness: There is no guarding or rebound.   Musculoskeletal:      Comments: Mild edema.    Relevant Results  Results for orders placed or performed during the hospital encounter of 06/08/25 (from the past 24 hours)   POCT GLUCOSE   Result Value Ref Range    POCT Glucose 131 (H) 74 - 99 mg/dL   CBC   Result Value Ref Range    WBC 4.7 4.4 - 11.3 x10*3/uL    nRBC 0.0 0.0 - 0.0 /100 WBCs    RBC 2.95 (L) 4.00 - 5.20 x10*6/uL    Hemoglobin 9.1 (L) 12.0 - 16.0 g/dL    Hematocrit 28.5 (L) 36.0 - 46.0 %    MCV 97 80 - 100 fL    MCH 30.8 26.0 - 34.0 pg    MCHC 31.9 (L) 32.0 - 36.0 g/dL    RDW 15.8 (H) 11.5 - 14.5 %    Platelets 153 150 - 450 x10*3/uL   Renal Function Panel   Result Value Ref Range    Glucose 83 74 - 99 mg/dL    Sodium 136 136 - 145 mmol/L    Potassium 4.6 3.5 - 5.3 mmol/L    Chloride 97 (L) 98 - 107 mmol/L    Bicarbonate 26 21 - 32 mmol/L    Anion Gap 18 10 - 20 mmol/L    Urea Nitrogen 42 (H) 6 - 23 mg/dL    Creatinine  7.05 (H) 0.50 - 1.05 mg/dL    eGFR 7 (L) >60 mL/min/1.73m*2    Calcium 7.6 (L) 8.6 - 10.3 mg/dL    Phosphorus 5.2 (H) 2.5 - 4.9 mg/dL    Albumin 3.6 3.4 - 5.0 g/dL   POCT GLUCOSE   Result Value Ref Range    POCT Glucose 102 (H) 74 - 99 mg/dL   POCT GLUCOSE   Result Value Ref Range    POCT Glucose 97 74 - 99 mg/dL                 Assessment & Plan  Complication associated with dialysis catheter    End-stage renal disease on hemodialysis (Multi)    Hyperkalemia    Palliative care encounter    End-stage renal disease on hemodialysis  Hyperkalemia, resolved  Hypertension  Anemia     Plan: Patient is s/p tunneled dialysis catheter exchange but still with significant pain in the dialysis catheter site which interventional radiology evaluated who advised medical management of the pain and recommended against new catheter due to her lack of alternate vasculature options.  Vascular surgery is also on consult.  Renvela for hyperphosphatemia.  Ensure renal diet.    Vu Pedersen MD

## 2025-06-14 NOTE — CONSULTS
Reason for Consult   End-stage renal disease, dialysis access dysfunction    History Of Present Illness    This is a 49-year-old female with past history of end-stage renal disease on dialysis, MRSA bacteremia with history of mitral valve endocarditis who presented to the emergency department on 6/8/2025 with concern of right groin tunneled line hemodialysis catheter dislodgment, dysfunction.  She underwent a wire catheter exchange on 6/10/2025 and had worsening pain in the groin and thigh.  Our service was consulted to discuss further options.  Long discussion with IR regarding patient being deemed end-stage vascular access.  Palliative care is involved as well to discuss possible transition to hospice.  Patient was tearful on exam.  Discussed that there are no further options vascular wise regarding fistula or graft placement.  She rates her right thigh groin pain 10 out of 10.  She is to undergo dialysis and is hoping for IV pain medication at that time because they have lost IV access and oral pain medications are giving her little relief.  Tells me she wants to think further regarding continued dialysis via the right groin catheter even though she has severe pain versus discussing end-of-life care.     Of note, patient last seen by our service in January 2020 for at that time it was discussed that there were no further options for AV fistula graft placement.  The patient has had multiple extensive surgeries bilateral upper extremities as well as left thigh.  Our service had advised continued dialysis using tunneled catheter.     Past Medical History  Medical History[1]      Surgical History  Surgical History[2]       Social History  Social History     Socioeconomic History    Marital status:      Spouse name: Not on file    Number of children: Not on file    Years of education: Not on file    Highest education level: Not on file   Occupational History    Not on file   Tobacco Use    Smoking status:  Never    Smokeless tobacco: Never   Vaping Use    Vaping status: Never Used   Substance and Sexual Activity    Alcohol use: Never    Drug use: Never    Sexual activity: Not on file     Comment: post menopausal from dialysys   Other Topics Concern    Not on file   Social History Narrative    Not on file     Social Drivers of Health     Financial Resource Strain: Low Risk  (6/9/2025)    Overall Financial Resource Strain (CARDIA)     Difficulty of Paying Living Expenses: Not hard at all   Food Insecurity: No Food Insecurity (6/9/2025)    Hunger Vital Sign     Worried About Running Out of Food in the Last Year: Never true     Ran Out of Food in the Last Year: Never true   Transportation Needs: No Transportation Needs (6/9/2025)    PRAPARE - Transportation     Lack of Transportation (Medical): No     Lack of Transportation (Non-Medical): No   Physical Activity: Inactive (6/9/2025)    Exercise Vital Sign     Days of Exercise per Week: 0 days     Minutes of Exercise per Session: 0 min   Stress: No Stress Concern Present (6/9/2025)    [employer] - Occupational Stress Questionnaire     Feeling of Stress : Not at all   Social Connections: Moderately Integrated (6/9/2025)    Social Connection and Isolation Panel [NHANES]     Frequency of Communication with Friends and Family: More than three times a week     Frequency of Social Gatherings with Friends and Family: More than three times a week     Attends [employer] Services: More than 4 times per year     Active Member of Clubs or Organizations: No     Attends Club or Organization Meetings: Never     Marital Status: Living with partner   Intimate Partner Violence: Not At Risk (6/9/2025)    Humiliation, Afraid, Rape, and Kick questionnaire     Fear of Current or Ex-Partner: No     Emotionally Abused: No     Physically Abused: No     Sexually Abused: No   Housing Stability: Low Risk  (6/9/2025)    Housing Stability Vital Sign     Unable to Pay for  Housing in the Last Year: No     Number of Times Moved in the Last Year: 0     Homeless in the Last Year: No         Family History  Family History[3]       Allergies  RX Allergies[4]      Relevant Results  Results for orders placed or performed during the hospital encounter of 06/08/25 (from the past 24 hours)   POCT GLUCOSE   Result Value Ref Range    POCT Glucose 106 (H) 74 - 99 mg/dL   POCT GLUCOSE   Result Value Ref Range    POCT Glucose 98 74 - 99 mg/dL   POCT GLUCOSE   Result Value Ref Range    POCT Glucose 131 (H) 74 - 99 mg/dL   CBC   Result Value Ref Range    WBC 4.7 4.4 - 11.3 x10*3/uL    nRBC 0.0 0.0 - 0.0 /100 WBCs    RBC 2.95 (L) 4.00 - 5.20 x10*6/uL    Hemoglobin 9.1 (L) 12.0 - 16.0 g/dL    Hematocrit 28.5 (L) 36.0 - 46.0 %    MCV 97 80 - 100 fL    MCH 30.8 26.0 - 34.0 pg    MCHC 31.9 (L) 32.0 - 36.0 g/dL    RDW 15.8 (H) 11.5 - 14.5 %    Platelets 153 150 - 450 x10*3/uL   Renal Function Panel   Result Value Ref Range    Glucose 83 74 - 99 mg/dL    Sodium 136 136 - 145 mmol/L    Potassium 4.6 3.5 - 5.3 mmol/L    Chloride 97 (L) 98 - 107 mmol/L    Bicarbonate 26 21 - 32 mmol/L    Anion Gap 18 10 - 20 mmol/L    Urea Nitrogen 42 (H) 6 - 23 mg/dL    Creatinine 7.05 (H) 0.50 - 1.05 mg/dL    eGFR 7 (L) >60 mL/min/1.73m*2    Calcium 7.6 (L) 8.6 - 10.3 mg/dL    Phosphorus 5.2 (H) 2.5 - 4.9 mg/dL    Albumin 3.6 3.4 - 5.0 g/dL   POCT GLUCOSE   Result Value Ref Range    POCT Glucose 102 (H) 74 - 99 mg/dL     Imaging  No results found.    Cardiology, Vascular, and Other Imaging  No other imaging results found for the past 2 days        Physical exam  Constitutional:  Alert and oriented to person, place, date/time in no acute distress.  Ill-appearing  HEENT:  Atraumatic, normocephalic. PERRL. EOMI.  Nares patent.  Mucous membranes moist.    Neck:  Trachea midline.  Respiratory:  Clear to auscultation.  Cardiac:  Regular rate and rhythm.    Abdominal:  Soft, nontender, nondistended, bowel sounds  present.  Musculoskeletal:  Moves extremities freely.  Dermatological: Right groin tunneled dialysis catheter, tender upon palpation of the surrounding skin.  No erythema.  No impressive edema.  Neurological: Alert and oriented to person, place, date/time  Psych:  Calm, cooperative    Assessment and Plan    End-stage renal disease, dialysis catheter dysfunction      49-year-old female end-stage renal disease on dialysis via right groin catheter who has severe pain associated with catheter placement.  No further options regarding fistula/graft creation, discussed at bedside on 6/14/2025.  Patient aware from previous discussion last year.  IR has deemed the patient is end-stage vascular access with no definitive alternative site to place a catheter.  Nephrology is following, awaiting dialysis again.  Patient to decide continued use of catheter versus transitioning to palliative care/hospice.  Palliative care is following    Our service will sign off             [1]   Past Medical History:  Diagnosis Date    Aortic valve replaced 2022    CHF (congestive heart failure)     Chronic pain     Coronary artery disease     Disease of thyroid gland     ESRD (end stage renal disease) (Multi)     H/O mitral valve replacement 2013    and 2022    Heart disease     History of transfusion     Hypertension     Mitral valve regurgitation     Seizures (Multi)     Stroke (Multi)    [2]   Past Surgical History:  Procedure Laterality Date    AORTIC VALVE REPLACEMENT  09/26/2022    bioprosthetic    CORONARY ARTERY BYPASS GRAFT      IR CVC  01/12/2024    exchange    IR CVC  05/07/2024    exchange    IR CVC  08/20/2024    removal    IR CVC TUNNELED  09/09/2022    IR CVC TUNNELED 9/9/2022 [facility]    IR CVC TUNNELED  12/28/2022    IR CVC TUNNELED 12/28/2022 [facility]    MITRAL VALVE REPAIR  2013    MITRAL VALVE REPLACEMENT  09/26/2022    bioprosthetic    PARATHYROIDECTOMY      TRICUSPID VALVULOPLASTY  2013    US GUIDED  "PERCUTANEOUS PLACEMENT  07/14/2022    US GUIDED PERCUTANEOUS PLACEMENT [facility]   [3]   Family History  Problem Relation Name Age of Onset    No Known Problems Mother      No Known Problems Father     [4]   Allergies  Allergen Reactions    Kayexalate Seizure    Metoclopramide Hcl Other     anxious, agitated, \"skin crawl    Prochlorperazine Other     anxious, agitated, \"skin crawl    Zofran [Ondansetron Hcl] Headache    Ondansetron Other and Headache     "

## 2025-06-14 NOTE — PROGRESS NOTES
"Batsheva Page is a 49 y.o. female on day 6 of admission presenting with Complication associated with dialysis catheter.    Subjective   The patient was seen and examined.  Asking for IV medication through her dialysis treatment for pain.    Objective     Physical Exam  Vitals and nursing note reviewed.   Constitutional:       General: She is not in acute distress.  HENT:      Head: Normocephalic and atraumatic.      Mouth/Throat:      Mouth: Mucous membranes are moist.   Eyes:      Pupils: Pupils are equal, round, and reactive to light.   Cardiovascular:      Rate and Rhythm: Normal rate and regular rhythm.      Heart sounds: Murmur heard.   Pulmonary:      Effort: Pulmonary effort is normal. No respiratory distress.      Breath sounds: Normal breath sounds.   Abdominal:      General: Bowel sounds are normal.   Genitourinary:     Comments: deferred  Musculoskeletal:      Cervical back: Neck supple. No rigidity.      Right lower leg: Edema present.      Comments: RLE cellulitis around the infection site in the upper R leg. There is warmth, tenderness, and swelling present   Skin:     General: Skin is dry.      Capillary Refill: Capillary refill takes 2 to 3 seconds.   Neurological:      Mental Status: She is oriented to person, place, and time.      Motor: Weakness present.     Last Recorded Vitals  Blood pressure (!) 140/93, pulse 58, temperature 36.6 °C (97.9 °F), temperature source Oral, resp. rate 17, height 1.651 m (5' 5\"), weight 69 kg (152 lb 1.9 oz), SpO2 98%.  Intake/Output last 3 Shifts:  No intake/output data recorded.    Relevant Results  Results for orders placed or performed during the hospital encounter of 06/08/25 (from the past 24 hours)   POCT GLUCOSE   Result Value Ref Range    POCT Glucose 106 (H) 74 - 99 mg/dL   POCT GLUCOSE   Result Value Ref Range    POCT Glucose 98 74 - 99 mg/dL   POCT GLUCOSE   Result Value Ref Range    POCT Glucose 131 (H) 74 - 99 mg/dL   CBC   Result Value Ref Range "    WBC 4.7 4.4 - 11.3 x10*3/uL    nRBC 0.0 0.0 - 0.0 /100 WBCs    RBC 2.95 (L) 4.00 - 5.20 x10*6/uL    Hemoglobin 9.1 (L) 12.0 - 16.0 g/dL    Hematocrit 28.5 (L) 36.0 - 46.0 %    MCV 97 80 - 100 fL    MCH 30.8 26.0 - 34.0 pg    MCHC 31.9 (L) 32.0 - 36.0 g/dL    RDW 15.8 (H) 11.5 - 14.5 %    Platelets 153 150 - 450 x10*3/uL   Renal Function Panel   Result Value Ref Range    Glucose 83 74 - 99 mg/dL    Sodium 136 136 - 145 mmol/L    Potassium 4.6 3.5 - 5.3 mmol/L    Chloride 97 (L) 98 - 107 mmol/L    Bicarbonate 26 21 - 32 mmol/L    Anion Gap 18 10 - 20 mmol/L    Urea Nitrogen 42 (H) 6 - 23 mg/dL    Creatinine 7.05 (H) 0.50 - 1.05 mg/dL    eGFR 7 (L) >60 mL/min/1.73m*2    Calcium 7.6 (L) 8.6 - 10.3 mg/dL    Phosphorus 5.2 (H) 2.5 - 4.9 mg/dL    Albumin 3.6 3.4 - 5.0 g/dL   POCT GLUCOSE   Result Value Ref Range    POCT Glucose 102 (H) 74 - 99 mg/dL      IR CVC tunneled  Result Date: 6/10/2025  Interpreted By:  Melvin Medina, STUDY: IR CVC TUNNELED;  6/10/2025 1:08 pm   INDICATION: Signs/Symptoms:Dislodged HD line.   COMPARISON: None.   ACCESSION NUMBER(S): [id number]   ORDERING CLINICIAN: HALLEY CHAMORRO   TECHNIQUE: INTERVENTIONALIST(S): Manuel Medina MD   CONSENT: The patient/patient's POA/next of kin was informed of the nature of the proposed procedure. The purposes, alternatives, risks, and benefits were explained and discussed. All questions were answered and consent was obtained.   RADIATION EXPOSURE: Fluoroscopy time: 0.7 min Dose: 3.72 mGy Dose Area Product (DAP): 1342 mGy*cm^2   SEDATION: Mac anesthesia was provided by the department of anesthesiology in maintained throughout the duration of the procedure.   MEDICATION: None.   TIME OUT: A time out was performed immediately prior to procedure start with the interventional team, correctly identifying the patient name, date of birth, MRN, procedure, anatomy (including marking of site and side), patient position, procedure consent form, relevant laboratory  and imaging test results, antibiotic administration, safety precautions, and procedure-specific equipment needs.   COMPLICATIONS: No immediate adverse events identified.   FINDINGS: In the recumbent position, the patient was positioned on the angiography table. MAC anesthesia was initiated by the Department of Anesthesiology and maintained throughout the duration of the procedure. The right inguinal cutaneous tissues and existing catheter were prepared and draped in usual sterile manner. An 035 Amplatz wire was placed through 1 of the lumens of the existing catheter. The existing catheter was removed.  After Lidocaine 1% local anesthesia, a 14.5 Tristanian x 44 cm hemodialysis catheter was then placed with tract continuity and its central catheter tip(s) to reside at the cavoatrial junction. A fluoroscopic spot image of the chest was acquired in the AP projection to confirm optimal location. The catheter ports were aspirated and flushed without resistance with normal saline. The catheter ports were then charged with high-concentration heparin. The external portions of the catheter were secured with a purse-string 2-0 polypropylene suture and sterile dressings.   The patient tolerated the procedure without complication.       1. Uncomplicated exchange of a tunneled right femoral 14.5 Fr x 44 cm hemodialysis catheter. The catheter is ready for immediate use.   Performed and dictated at [facility].   MACRO: None.   Signed by: Melvin Medina 6/10/2025 4:02 PM Dictation workstation:   [id number]     Scheduled medications  Scheduled Medications[1]  Continuous medications  Continuous Medications[2]  PRN medications  PRN Medications[3]     Assessment & Plan  Palliative care encounter  Infected hemodialysis cath  Tunneled cath was exchanged by IR on 6/10.   Having significant pain which is worse than prior - per IR there are no definitive alternative sites to place a catheter, as many of  her previously used sites/veins are occluded.   Site, tender, swollen, and warm   Vascular consulted - no further options vascular wise regarding fistula or graft placement.  Blood cultures and Lactate WNL  Wound culture with MRSA   Patient on Vanco - ID following     ESRD on Hemodialysis (limited access - now with a fem tunneled cath)  Dialysis MWF   The access line was clear  The patient has end stage dialysis access  The goal is to limit the pain in the site as another exchange of the site could risk current access         CAD/HTN  Medication management   Nephrology titrating BP meds for better control      Anemia  Of Chronic disease  Monitor H/H     Pain  From dialysis cath  RLS - this is managed with Lyrica and Benadryl at home  Scheduled Tylenol 650mg every 6 hours  Dilaudid 1 mg as scheduled every 6 hours  Robaxin 500 mg scheduled every 8 hours  Oxy IR 5 mg as needed every 4 hours  Lidocaine 5% topical solution as needed every 4 hours    The patient has been requesting IV Dilaudid and IV Benadryl during dialysis for severe pain  The patient does not have IV access        [facility]/ Depression  [facility]     Palliative Care   DNR CCA DNI   The patient has decision making capacity  Has one child, Xiao, who is her only child and the patient is .   She does have a significant other - Chava OBRIEN in EPIC - POA-HC is incorrect (as it lists her daugther as the patient - I reached out to case management to assist with completing a new one.      6/14/2025  I did talk to the patient about adjusting her pain medications today.  She stated that she just wanted IV medications for pain during dialysis and did not want the rest of her pain medication adjusted today.  She states that her dialysis cath is not right.  We did talk alternate options that were given to her such as through the liver or the heart, and the patient is not agreeable to this.  The patient is very somber today due to the news she was given  yesterday about there being very limited options for a new dialysis site.  She states that she is not ready to die, and she really needs to think about her options moving forward.  She states she just wants to leave and go home.  We did talk about pain control with leaving here.  We also discussed palliative care with [facility] who can assist with pain control in the outpatient setting.  She states that she needs to think about this.  I did give her a one-time dose of IV Dilaudid during dialysis, as the patient has had this regimen previously this week.  I will follow-up tomorrow to further reassess goals of care, discharge planning, and pain control.  I did discuss this with the attending team.    6/13/2025  Goals of care discussion with the patient.  We talked about CODE STATUS and this was changed in epic per our conversation.  Discussion regarding goals of care.  The patient is aware of her current condition and trajectory.  We did talk about how long the patient has been on dialysis (21 years) and quality of life.  She stated that she does value quality of life.  She has a stated that being on dialysis for 21 years is very hard.  We asked about systems of support that she stated that her daughter Xiao and sig gabino Larsen is her support system.  He denies depression, but does endorse anxiety.  She stated that she does not know how much longer she can continue to do dialysis given her current quality of life.  She states that the only reason for her to continue is for her daughter and significant other.  We did talk about lack of dialysis access.  The patient states she does understand that her access is limited-and she is waiting for the decision to be made for her to stop dialysis versus her making a decision to stop dialysis.  She states that not having access would be bittersweet-only had a relief that she does not have to make a decision about completing dialysis-and on the other hand knowing that not  receiving dialysis is life limiting.     I did adjust her pain regimen -the patient states that she is having pain in the RLE that is a 10 out of 10 and sharp stabbing and burning.  Patient also has lost IV access.     I scheduled Tylenol 650mg every 6 hours  Dilaudid 1 mg as scheduled every 6 hours  Robaxin 500 mg scheduled every 8 hours  Oxy IR 5 mg as needed every 4 hours  Lidocaine 5% topical solution as needed every 4 hours     Patient is getting Benadryl as needed 50 mg for anxiety and Lyrica for restless leg syndrome.     PDMP was reviewed, MME was calculated to with the patient was getting in the hospital as well as with the patient was getting outpatient.  A similar regimen was prescribed with breakthrough medications.     Will monitor for effectiveness.     Update: Spoke to the patient regarding her conversation with IR.  She was told by IR that she does not have many options for dialysis access.  We did explore this.  She states that she was preparing for this in the last couple of years.  She states that this would possibly be a relief as she does not have to make a decision.  We talked about the option of continuing dialysis with her current lying which is causing her pain-and aiming for better pain control with outpatient palliative care.  We also discussed stopping dialysis and implementing comfort measures.  She stated that this is a hard decision, I did validate this.  I did ask if we could bring her partner and daughter in to discuss this with her.  She stated that her partner was coming in tonight after work, and she would discuss her options.  I did update the primary team.  We will continue to follow.    I spent 60 minutes in the professional and overall care of this patient.      Vannesa He, APRN-CNP         [1] acetaminophen, 650 mg, oral, q6h  amLODIPine, 5 mg, oral, Daily  aspirin, 81 mg, oral, Daily  atorvastatin, 40 mg, oral, Nightly  calcitriol, 0.5 mcg, oral, Daily  carvedilol, 12.5  mg, oral, BID  cloNIDine, 0.3 mg, oral, q8h KIMBERLY  epoetin brandy or biosimilar, 6,000 Units, subcutaneous, Once per day on Monday Wednesday Friday  ergocalciferol, 1.25 mg, oral, Every Sunday  heparin, 5,000 Units, subcutaneous, q12h  heparin flush, 5 mL, intra-catheter, Once  HYDROmorphone, 1 mg, oral, q6h  levETIRAcetam, 750 mg, oral, Nightly  lidocaine, 0.1 mL, subcutaneous, Once  methocarbamol, 500 mg, oral, q8h KIMBERLY  pantoprazole, 40 mg, oral, Daily before breakfast  pregabalin, 75 mg, oral, Daily  sevelamer carbonate, 800 mg, oral, TID  sodium chloride, 1 spray, Each Nostril, Before meals & nightly  sulfamethoxazole-trimethoprim, 1 tablet, oral, BID  vancomycin 5 mg/mL + heparin 2500 units/mL in NS lock, 2 mL, intra-catheter, After Dialysis  vancomycin 5 mg/mL + heparin 2500 units/mL in NS lock, 2 mL, intra-catheter, After Dialysis  [2]    [3] PRN medications: dextrose, dextrose, diphenhydrAMINE, glucagon, glucagon, lidocaine, LORazepam, naloxone, oxyCODONE

## 2025-06-14 NOTE — PROGRESS NOTES
"Batsheva Page is a 49 y.o. female on day 6 of admission presenting with Complication associated with dialysis catheter.      Subjective   Pt seen and examined at bedside. Awake/alert/oriented.  Denies chest pain, shortness of breath, fevers, chills, nausea, or vomiting.  Still having pain to dialysis site, medicated for pain by RN.  Patient is tearful, reported excruciating pain during dialysis, stating \"I do not want to die.\"  Given IV Dilaudid twice in dialysis in addition to IV Benadryl.  Palliative care following.  Discussed with vascular NP as well as palliative care NP.     Objective     Last Recorded Vitals  /75   Pulse 68   Temp 36.6 °C (97.9 °F) (Temporal)   Resp 17   Wt 69 kg (152 lb 1.9 oz)   SpO2 98%   Intake/Output last 3 Shifts:    Intake/Output Summary (Last 24 hours) at 6/14/2025 1602  Last data filed at 6/14/2025 1554  Gross per 24 hour   Intake 1600 ml   Output 5800 ml   Net -4200 ml       Admission Weight  Weight: 69.4 kg (153 lb) (06/08/25 1152)    Daily Weight  06/10/25 : 69 kg (152 lb 1.9 oz)    Image Results  IR CVC tunneled  Narrative: Interpreted By:  Melvin Medina,   STUDY:  IR CVC TUNNELED;  6/10/2025 1:08 pm      INDICATION:  Signs/Symptoms:Dislodged HD line.      COMPARISON:  None.      ACCESSION NUMBER(S):  [id number]      ORDERING CLINICIAN:  HALLEY CHAMORRO      TECHNIQUE:  INTERVENTIONALIST(S):  Manuel Medina MD      CONSENT:  The patient/patient's POA/next of kin was informed of the nature of  the proposed procedure. The purposes, alternatives, risks, and  benefits were explained and discussed. All questions were answered  and consent was obtained.      RADIATION EXPOSURE:  Fluoroscopy time: 0.7 min  Dose: 3.72 mGy  Dose Area Product (DAP): 1342 mGy*cm^2      SEDATION:  Mac anesthesia was provided by the department of anesthesiology in  maintained throughout the duration of the procedure.      MEDICATION:  None.      TIME OUT:  A time out was performed " immediately prior to procedure start with  the interventional team, correctly identifying the patient name, date  of birth, MRN, procedure, anatomy (including marking of site and  side), patient position, procedure consent form, relevant laboratory  and imaging test results, antibiotic administration, safety  precautions, and procedure-specific equipment needs.      COMPLICATIONS:  No immediate adverse events identified.      FINDINGS:  In the recumbent position, the patient was positioned on the  angiography table. MAC anesthesia was initiated by the Department of  Anesthesiology and maintained throughout the duration of the  procedure. The right inguinal cutaneous tissues and existing catheter  were prepared and draped in usual sterile manner. An 035 Amplatz wire  was placed through 1 of the lumens of the existing catheter. The  existing catheter was removed.  After Lidocaine 1% local anesthesia,  a 14.5 Armenian x 44 cm hemodialysis catheter was then placed with  tract continuity and its central catheter tip(s) to reside at the  cavoatrial junction. A fluoroscopic spot image of the chest was  acquired in the AP projection to confirm optimal location. The  catheter ports were aspirated and flushed without resistance with  normal saline. The catheter ports were then charged with  high-concentration heparin. The external portions of the catheter  were secured with a purse-string 2-0 polypropylene suture and sterile  dressings.      The patient tolerated the procedure without complication.      Impression: 1. Uncomplicated exchange of a tunneled right femoral 14.5 Fr x 44 cm  hemodialysis catheter. The catheter is ready for immediate use.      Performed and dictated at [facility].      MACRO:  None.      Signed by: Melvin Medina 6/10/2025 4:02 PM  Dictation workstation:   [id number]      Physical Exam  Vitals and nursing note reviewed.   Constitutional:       General: She is  not in acute distress.     Appearance: Normal appearance. She is not toxic-appearing.   HENT:      Head: Normocephalic and atraumatic.      Nose: Nose normal.      Mouth/Throat:      Mouth: Mucous membranes are moist.   Eyes:      General: Lids are normal.      Extraocular Movements: Extraocular movements intact.      Conjunctiva/sclera: Conjunctivae normal.   Cardiovascular:      Rate and Rhythm: Normal rate and regular rhythm.   Pulmonary:      Effort: Pulmonary effort is normal.      Breath sounds: Normal breath sounds. No wheezing, rhonchi or rales.   Abdominal:      General: Bowel sounds are normal.      Palpations: Abdomen is soft.      Tenderness: There is no abdominal tenderness.   Musculoskeletal:         General: Normal range of motion.      Cervical back: Normal range of motion and neck supple.   Skin:     General: Skin is warm and dry.      Capillary Refill: Capillary refill takes less than 2 seconds.      Findings: Erythema present.   Neurological:      General: No focal deficit present.      Mental Status: She is alert and oriented to person, place, and time.       Relevant Results  Lab Results   Component Value Date    GLUCOSE 83 06/14/2025    CALCIUM 7.6 (L) 06/14/2025     06/14/2025    K 4.6 06/14/2025    CO2 26 06/14/2025    CL 97 (L) 06/14/2025    BUN 42 (H) 06/14/2025    CREATININE 7.05 (H) 06/14/2025      Lab Results   Component Value Date    WBC 4.7 06/14/2025    HGB 9.1 (L) 06/14/2025    HCT 28.5 (L) 06/14/2025    MCV 97 06/14/2025     06/14/2025        CBC, BMP, Vital signs reviewed    Assessment and Plan    Infected hemodialysis catheter   per patient HD line sliding noted erythema and purulent drainage  -blood cultures negative to date  - wound culture MRSA  - ID consultation following, appreciate recs.  Plan for Bactrim and vancomycin dwell  - Status post hemodialysis catheter exchange on 6/10/2025.  Still complaining of severe pain to new hemodialysis catheter site.     -Patient has been seen by vascular surgery and interventional radiology.  Unfortunately patient has no other options for access.  Recommendation is to use the dialysis catheter in place however it is causing her significant pain.  Palliative care has been following and medications are being adjusted, appreciate recommendations     ESRD on hemodialysis  hyperkalemia resolved  - HD-> M/W/F   - nephrology consult placed  - Patient unable to complete full dialysis on 6/10/2025, HD 6/12/25  - Hemodialysis per nephrology     HTN  - Continue antihypertensives  - Monitor blood pressure close     Anemia  - Likely secondary to CKD vs ABNER  - Check iron panel-reviewed, occult stool  - Hemoglobin 9.2-stable  - PPI     Seizure  - resumed home medication  - seizure precaution     CAD  - ASA and statin  - no chest pain currently     Anxiety/depression  - resumed home medications      GI prophylaxis  -PPI     DVT prophylaxis  - subcutaneous heparin     Epistaxis  - Nasal spray  - Monitor H&H  - ENT consult if needed     Plan  Monitor on telemetry  Monitor fluid/electrolytes close  Hemodialysis per nephrology  Continue antibiotics, infectious disease following.  Recommendation for Bactrim and vancomycin dwell  DVT prophylaxis  CBC and BMP in AM  Pain management  Palliative care following    Plan for discharge home when able to tolerate HD     Discussed with palliative, nephrology, vascular surgery. Pain medications have been adjusted.      ORI Kincaid-CNP

## 2025-06-14 NOTE — POST-PROCEDURE NOTE
Report to Receiving RN:    Report To: Ivory Sim, RN   Time Report Called: 1540  Hand-Off Communication: treatment completed 30 mn early DR. AURE BAUTISTA aware. 2.5 liters removed. Vanco locks in cvc. Last bp 163/77 hr 80  Complications During Treatment: Yes  Ultrafiltration Treatment: Yes  Medications Administered During Dialysis: Yes, SEE MAR  Blood Products Administered During Dialysis: No  Labs Sent During Dialysis: No  Heparin Drip Rate Changes: No  CVC DS2025 CLEAN DRY INTACT. NEXT DSG CHANGE DUE 2025        Last Updated: 3:54 PM by DAVON WALSH

## 2025-06-15 ENCOUNTER — APPOINTMENT (OUTPATIENT)
Dept: RADIOLOGY | Facility: HOSPITAL | Age: 50
DRG: 314 | End: 2025-06-15
Payer: MEDICARE

## 2025-06-15 VITALS
BODY MASS INDEX: 25.34 KG/M2 | SYSTOLIC BLOOD PRESSURE: 147 MMHG | OXYGEN SATURATION: 95 % | RESPIRATION RATE: 18 BRPM | HEIGHT: 65 IN | HEART RATE: 71 BPM | TEMPERATURE: 97.9 F | WEIGHT: 152.12 LBS | DIASTOLIC BLOOD PRESSURE: 96 MMHG

## 2025-06-15 LAB
ANION GAP SERPL CALCULATED.3IONS-SCNC: 18 MMOL/L (ref 10–20)
BUN SERPL-MCNC: 26 MG/DL (ref 6–23)
CALCIUM SERPL-MCNC: 7.8 MG/DL (ref 8.6–10.3)
CHLORIDE SERPL-SCNC: 96 MMOL/L (ref 98–107)
CO2 SERPL-SCNC: 26 MMOL/L (ref 21–32)
CREAT SERPL-MCNC: 4.99 MG/DL (ref 0.5–1.05)
EGFRCR SERPLBLD CKD-EPI 2021: 10 ML/MIN/1.73M*2
ERYTHROCYTE [DISTWIDTH] IN BLOOD BY AUTOMATED COUNT: 15.8 % (ref 11.5–14.5)
GLUCOSE BLD MANUAL STRIP-MCNC: 102 MG/DL (ref 74–99)
GLUCOSE BLD MANUAL STRIP-MCNC: 107 MG/DL (ref 74–99)
GLUCOSE BLD MANUAL STRIP-MCNC: 116 MG/DL (ref 74–99)
GLUCOSE BLD MANUAL STRIP-MCNC: 150 MG/DL (ref 74–99)
GLUCOSE SERPL-MCNC: 81 MG/DL (ref 74–99)
HCT VFR BLD AUTO: 28.1 % (ref 36–46)
HGB BLD-MCNC: 9 G/DL (ref 12–16)
MCH RBC QN AUTO: 31 PG (ref 26–34)
MCHC RBC AUTO-ENTMCNC: 32 G/DL (ref 32–36)
MCV RBC AUTO: 97 FL (ref 80–100)
NRBC BLD-RTO: 0 /100 WBCS (ref 0–0)
PLATELET # BLD AUTO: 165 X10*3/UL (ref 150–450)
POTASSIUM SERPL-SCNC: 4.5 MMOL/L (ref 3.5–5.3)
RBC # BLD AUTO: 2.9 X10*6/UL (ref 4–5.2)
SODIUM SERPL-SCNC: 135 MMOL/L (ref 136–145)
WBC # BLD AUTO: 4.4 X10*3/UL (ref 4.4–11.3)

## 2025-06-15 PROCEDURE — 2500000002 HC RX 250 W HCPCS SELF ADMINISTERED DRUGS (ALT 637 FOR MEDICARE OP, ALT 636 FOR OP/ED): Performed by: INTERNAL MEDICINE

## 2025-06-15 PROCEDURE — 2500000001 HC RX 250 WO HCPCS SELF ADMINISTERED DRUGS (ALT 637 FOR MEDICARE OP): Performed by: INTERNAL MEDICINE

## 2025-06-15 PROCEDURE — 99232 SBSQ HOSP IP/OBS MODERATE 35: CPT | Performed by: NURSE PRACTITIONER

## 2025-06-15 PROCEDURE — 74176 CT ABD & PELVIS W/O CONTRAST: CPT

## 2025-06-15 PROCEDURE — 36415 COLL VENOUS BLD VENIPUNCTURE: CPT | Performed by: NURSE PRACTITIONER

## 2025-06-15 PROCEDURE — 85027 COMPLETE CBC AUTOMATED: CPT | Performed by: NURSE PRACTITIONER

## 2025-06-15 PROCEDURE — 74176 CT ABD & PELVIS W/O CONTRAST: CPT | Performed by: RADIOLOGY

## 2025-06-15 PROCEDURE — 2500000004 HC RX 250 GENERAL PHARMACY W/ HCPCS (ALT 636 FOR OP/ED): Performed by: NURSE PRACTITIONER

## 2025-06-15 PROCEDURE — 2500000001 HC RX 250 WO HCPCS SELF ADMINISTERED DRUGS (ALT 637 FOR MEDICARE OP): Performed by: NURSE PRACTITIONER

## 2025-06-15 PROCEDURE — 80048 BASIC METABOLIC PNL TOTAL CA: CPT | Performed by: NURSE PRACTITIONER

## 2025-06-15 PROCEDURE — 1200000002 HC GENERAL ROOM WITH TELEMETRY DAILY

## 2025-06-15 PROCEDURE — 2500000001 HC RX 250 WO HCPCS SELF ADMINISTERED DRUGS (ALT 637 FOR MEDICARE OP)

## 2025-06-15 PROCEDURE — 2500000004 HC RX 250 GENERAL PHARMACY W/ HCPCS (ALT 636 FOR OP/ED): Performed by: INTERNAL MEDICINE

## 2025-06-15 PROCEDURE — 82947 ASSAY GLUCOSE BLOOD QUANT: CPT

## 2025-06-15 PROCEDURE — 99233 SBSQ HOSP IP/OBS HIGH 50: CPT

## 2025-06-15 RX ORDER — DIPHENHYDRAMINE HYDROCHLORIDE 50 MG/ML
25 INJECTION, SOLUTION INTRAMUSCULAR; INTRAVENOUS
Status: DISCONTINUED | OUTPATIENT
Start: 2025-06-15 | End: 2025-06-15

## 2025-06-15 RX ORDER — DIPHENHYDRAMINE HYDROCHLORIDE 50 MG/ML
50 INJECTION, SOLUTION INTRAMUSCULAR; INTRAVENOUS
Status: DISCONTINUED | OUTPATIENT
Start: 2025-06-15 | End: 2025-06-19 | Stop reason: HOSPADM

## 2025-06-15 RX ADMIN — SULFAMETHOXAZOLE AND TRIMETHOPRIM 1 TABLET: 400; 80 TABLET ORAL at 08:15

## 2025-06-15 RX ADMIN — HYDROMORPHONE HYDROCHLORIDE 0.4 MG: 1 INJECTION, SOLUTION INTRAMUSCULAR; INTRAVENOUS; SUBCUTANEOUS at 21:23

## 2025-06-15 RX ADMIN — CARVEDILOL 12.5 MG: 12.5 TABLET, FILM COATED ORAL at 08:14

## 2025-06-15 RX ADMIN — ACETAMINOPHEN 650 MG: 325 TABLET ORAL at 11:30

## 2025-06-15 RX ADMIN — DIPHENHYDRAMINE HYDROCHLORIDE 50 MG: 50 INJECTION, SOLUTION INTRAMUSCULAR; INTRAVENOUS at 21:23

## 2025-06-15 RX ADMIN — CLONIDINE HYDROCHLORIDE 0.3 MG: 0.3 TABLET ORAL at 21:24

## 2025-06-15 RX ADMIN — HYDROMORPHONE HYDROCHLORIDE 0.4 MG: 1 INJECTION, SOLUTION INTRAMUSCULAR; INTRAVENOUS; SUBCUTANEOUS at 12:17

## 2025-06-15 RX ADMIN — HYDROMORPHONE HYDROCHLORIDE 0.4 MG: 1 INJECTION, SOLUTION INTRAMUSCULAR; INTRAVENOUS; SUBCUTANEOUS at 05:33

## 2025-06-15 RX ADMIN — CLONIDINE HYDROCHLORIDE 0.3 MG: 0.3 TABLET ORAL at 14:25

## 2025-06-15 RX ADMIN — METHOCARBAMOL TABLETS 500 MG: 500 TABLET, COATED ORAL at 05:32

## 2025-06-15 RX ADMIN — HYDROMORPHONE HYDROCHLORIDE 0.4 MG: 1 INJECTION, SOLUTION INTRAMUSCULAR; INTRAVENOUS; SUBCUTANEOUS at 09:13

## 2025-06-15 RX ADMIN — ACETAMINOPHEN 650 MG: 325 TABLET ORAL at 05:32

## 2025-06-15 RX ADMIN — AMLODIPINE BESYLATE 5 MG: 5 TABLET ORAL at 08:15

## 2025-06-15 RX ADMIN — DIPHENHYDRAMINE HYDROCHLORIDE 25 MG: 50 INJECTION, SOLUTION INTRAMUSCULAR; INTRAVENOUS at 02:32

## 2025-06-15 RX ADMIN — SULFAMETHOXAZOLE AND TRIMETHOPRIM 1 TABLET: 400; 80 TABLET ORAL at 21:24

## 2025-06-15 RX ADMIN — METHOCARBAMOL TABLETS 500 MG: 500 TABLET, COATED ORAL at 14:25

## 2025-06-15 RX ADMIN — HYDROMORPHONE HYDROCHLORIDE 0.4 MG: 1 INJECTION, SOLUTION INTRAMUSCULAR; INTRAVENOUS; SUBCUTANEOUS at 02:32

## 2025-06-15 RX ADMIN — METHOCARBAMOL TABLETS 500 MG: 500 TABLET, COATED ORAL at 21:24

## 2025-06-15 RX ADMIN — SALINE NASAL SPRAY 1 SPRAY: 1.5 SOLUTION NASAL at 21:24

## 2025-06-15 RX ADMIN — DIPHENHYDRAMINE HYDROCHLORIDE 25 MG: 50 INJECTION, SOLUTION INTRAMUSCULAR; INTRAVENOUS at 12:17

## 2025-06-15 RX ADMIN — DIPHENHYDRAMINE HYDROCHLORIDE 25 MG: 50 INJECTION, SOLUTION INTRAMUSCULAR; INTRAVENOUS at 09:13

## 2025-06-15 RX ADMIN — ASPIRIN 81 MG: 81 TABLET, COATED ORAL at 08:20

## 2025-06-15 RX ADMIN — CALCITRIOL CAPSULES 0.25 MCG 0.5 MCG: 0.25 CAPSULE ORAL at 08:15

## 2025-06-15 RX ADMIN — DIPHENHYDRAMINE HYDROCHLORIDE 50 MG: 50 INJECTION, SOLUTION INTRAMUSCULAR; INTRAVENOUS at 18:17

## 2025-06-15 RX ADMIN — CLONIDINE HYDROCHLORIDE 0.3 MG: 0.3 TABLET ORAL at 05:35

## 2025-06-15 RX ADMIN — HYDROMORPHONE HYDROCHLORIDE 0.4 MG: 1 INJECTION, SOLUTION INTRAMUSCULAR; INTRAVENOUS; SUBCUTANEOUS at 15:26

## 2025-06-15 RX ADMIN — PREGABALIN 75 MG: 75 CAPSULE ORAL at 08:14

## 2025-06-15 RX ADMIN — CARVEDILOL 12.5 MG: 12.5 TABLET, FILM COATED ORAL at 21:23

## 2025-06-15 RX ADMIN — DIPHENHYDRAMINE HYDROCHLORIDE 25 MG: 50 INJECTION, SOLUTION INTRAMUSCULAR; INTRAVENOUS at 15:26

## 2025-06-15 RX ADMIN — HYDROMORPHONE HYDROCHLORIDE 0.4 MG: 1 INJECTION, SOLUTION INTRAMUSCULAR; INTRAVENOUS; SUBCUTANEOUS at 18:17

## 2025-06-15 ASSESSMENT — PAIN DESCRIPTION - LOCATION
LOCATION: GROIN

## 2025-06-15 ASSESSMENT — PAIN DESCRIPTION - ORIENTATION
ORIENTATION: RIGHT

## 2025-06-15 ASSESSMENT — PAIN SCALES - GENERAL
PAINLEVEL_OUTOF10: 10 - WORST POSSIBLE PAIN
PAINLEVEL_OUTOF10: 7
PAINLEVEL_OUTOF10: 5 - MODERATE PAIN
PAINLEVEL_OUTOF10: 10 - WORST POSSIBLE PAIN
PAINLEVEL_OUTOF10: 7
PAINLEVEL_OUTOF10: 7
PAINLEVEL_OUTOF10: 8
PAINLEVEL_OUTOF10: 8
PAINLEVEL_OUTOF10: 5 - MODERATE PAIN
PAINLEVEL_OUTOF10: 10 - WORST POSSIBLE PAIN
PAINLEVEL_OUTOF10: 10 - WORST POSSIBLE PAIN

## 2025-06-15 ASSESSMENT — PAIN DESCRIPTION - DESCRIPTORS
DESCRIPTORS: SHOOTING
DESCRIPTORS: STABBING
DESCRIPTORS: SHOOTING
DESCRIPTORS: SHOOTING
DESCRIPTORS: CRAMPING;STABBING
DESCRIPTORS: ACHING
DESCRIPTORS: ACHING

## 2025-06-15 NOTE — CARE PLAN
Problem: Pain - Adult  Goal: Verbalizes/displays adequate comfort level or baseline comfort level  Outcome: Progressing   The patient's goals for the shift include Rest well & safety    The clinical goals for the shift include pain control

## 2025-06-15 NOTE — CARE PLAN
The patient's goals for the shift include Rest well & safety    The clinical goals for the shift include pain control      Problem: Pain - Adult  Goal: Verbalizes/displays adequate comfort level or baseline comfort level  Outcome: Progressing     Problem: Safety - Adult  Goal: Free from fall injury  Outcome: Progressing     Problem: Discharge Planning  Goal: Discharge to home or other facility with appropriate resources  Outcome: Progressing     Problem: Chronic Conditions and Co-morbidities  Goal: Patient's chronic conditions and co-morbidity symptoms are monitored and maintained or improved  Outcome: Progressing     Problem: Nutrition  Goal: Nutrient intake appropriate for maintaining nutritional needs  Outcome: Progressing     Problem: Pain  Goal: Takes deep breaths with improved pain control throughout the shift  Outcome: Progressing  Goal: Turns in bed with improved pain control throughout the shift  Outcome: Progressing  Goal: Walks with improved pain control throughout the shift  Outcome: Progressing  Goal: Performs ADL's with improved pain control throughout shift  Outcome: Progressing  Goal: Participates in PT with improved pain control throughout the shift  Outcome: Progressing  Goal: Free from opioid side effects throughout the shift  Outcome: Progressing  Goal: Free from acute confusion related to pain meds throughout the shift  Outcome: Progressing     Problem: Skin  Goal: Participates in plan/prevention/treatment measures  Outcome: Progressing  Goal: Prevent/manage excess moisture  Outcome: Progressing  Goal: Promote skin healing  Outcome: Progressing  Goal: Decreased wound size/increased tissue granulation at next dressing change  Outcome: Progressing  Goal: Promote/optimize nutrition  Outcome: Progressing     Problem: Fall/Injury  Goal: Not fall by end of shift  Outcome: Progressing  Goal: Be free from injury by end of the shift  Outcome: Progressing

## 2025-06-15 NOTE — PROGRESS NOTES
06/15/25 1006   Discharge Planning   Expected Discharge Disposition Home   Does the patient need discharge transport arranged? No     Referral made to [facility] for palliative care per request of Palliative medicine.

## 2025-06-15 NOTE — ASSESSMENT & PLAN NOTE
Infected hemodialysis cath  Tunneled cath was exchanged by IR on 6/10.   Having significant pain which is worse than prior - per IR there are no definitive alternative sites to place a catheter, as many of her previously used sites/veins are occluded.   Site, tender, swollen, and warm   Vascular consulted - no further options vascular wise regarding fistula or graft placement.  Blood cultures and Lactate WNL  Wound culture with MRSA   Patient on Vanco - ID following     ESRD on Hemodialysis (limited access - now with a fem tunneled cath)  Dialysis MWF   Current line has good access, The patient has end stage dialysis access  The goal is to limit the pain in the site as another exchange of the site could risk current access       The patient is tolerating dialysis from a fluid removal standpoint, but having severe pain at rest from the line and during dialysis.     CAD/HTN  Medication management   Nephrology titrating BP meds for better control      Anemia  Of Chronic disease  Monitor H/H     Pain  From dialysis cath  RLS - this is managed with Lyrica and Benadryl at home    6/13/2025  Scheduled Tylenol 650mg every 6 hours  Dilaudid 1 mg as scheduled every 6 hours  Robaxin 500 mg scheduled every 8 hours  Oxy IR 5 mg as needed every 4 hours  Lidocaine 5% topical solution as needed every 4 hours    The patient has been requesting IV Dilaudid and IV Benadryl during dialysis for severe pain  The patient does not have IV access      6/14/2025  IV access was regained last night. PO Dilaudid was discontinued by the primary team and IV Dilaudid was re-ordered.     Current regimen   Scheduled Tylenol 650mg every 6 hours  Robaxin 500 mg scheduled every 8 hours  Oxy IR 5 mg as needed every 4 hours  Lidocaine 5% topical solution as needed every 4 hours  Dilaudid 0.2mg IV q3h as needed for moderate pain  Dilaudid 0.4mg IV q3h as needed for severe pain    Recommendations from palliative care:  Dose PO analgesics to determine a  regimen where the patient can transition home. Follow up with [facility]iative care to transition home with a team who will manage pain and navigate treatment goals.     MME dose in the past 24h timeframe  45MME  Current home MME dose   30 MME     Recommendations for home dosing  Dilaudid 2mg PO q6h (40MME) OR  Oxycodone 10mg PO q8h (45MME)  Per UptoDate Oxy dosing is not preferable more than 3 times a day - renal patients can develop Oxy toxicity. STRONG Recommendation for Dilaudid to discharge home.     Anxiety/ Depression /Pruritis  As needed Ativan   Benadryl for itching     Palliative Care   DNR CCA DNI   The patient has decision making capacity  Has one child, Xiao, who is her only child and the patient is .   She does have a significant other - Chava OBRIEN in EPIC - POA-HC is incorrect (as it lists her daugther as the patient - I reached out to case management to assist with completing a new one.      6/15/2025  Discussion with the patient. She states that her hopes are that dialysis gets easier with her current line. We did discuss palliative care outpatient where a team can follow her and help to define treatment goals down the line and  manage her pain. She is agreeable to this. Her pain is better controlled with the current regimen of IV pain medications. Per the primary team the patient understands that she will be sent back home on PO pain medications. The patient states that Oxy or Dilaudid have the same effect and does not have a preference for going home on one of the other. I recommend with ESRD - Dilaudid being optimal. Goals of care are well established. Pain is being managed by the primary team, they have our recommendations, also a referral to palliative care with [facility] has been placed by care coordination.      6/14/2025  I did talk to the patient about adjusting her pain medications today.  She stated that she just wanted IV medications for pain during dialysis and did not  want the rest of her pain medication adjusted today.  She states that her dialysis cath is not right.  We did talk alternate options that were given to her such as through the liver or the heart, and the patient is not agreeable to this.  The patient is very somber today due to the news she was given yesterday about there being very limited options for a new dialysis site.  She states that she is not ready to die, and she really needs to think about her options moving forward.  She states she just wants to leave and go home.  We did talk about pain control with leaving here.  We also discussed palliative care with [facility] who can assist with pain control in the outpatient setting.  She states that she needs to think about this.  I did give her a one-time dose of IV Dilaudid during dialysis, as the patient has had this regimen previously this week.  I will follow-up tomorrow to further reassess goals of care, discharge planning, and pain control.  I did discuss this with the attending team.    6/13/2025  Goals of care discussion with the patient.  We talked about CODE STATUS and this was changed in epic per our conversation.  Discussion regarding goals of care.  The patient is aware of her current condition and trajectory.  We did talk about how long the patient has been on dialysis (21 years) and quality of life.  She stated that she does value quality of life.  She has a stated that being on dialysis for 21 years is very hard.  We asked about systems of support that she stated that her daughter Xiao and sig gabino Larsen is her support system.  He denies depression, but does endorse anxiety.  She stated that she does not know how much longer she can continue to do dialysis given her current quality of life.  She states that the only reason for her to continue is for her daughter and significant other.  We did talk about lack of dialysis access.  The patient states she does understand that her access is  limited-and she is waiting for the decision to be made for her to stop dialysis versus her making a decision to stop dialysis.  She states that not having access would be bittersweet-only had a relief that she does not have to make a decision about completing dialysis-and on the other hand knowing that not receiving dialysis is life limiting.     I did adjust her pain regimen -the patient states that she is having pain in the RLE that is a 10 out of 10 and sharp stabbing and burning.  Patient also has lost IV access.     I scheduled Tylenol 650mg every 6 hours  Dilaudid 1 mg as scheduled every 6 hours  Robaxin 500 mg scheduled every 8 hours  Oxy IR 5 mg as needed every 4 hours  Lidocaine 5% topical solution as needed every 4 hours     Patient is getting Benadryl as needed 50 mg for anxiety and Lyrica for restless leg syndrome.     PDMP was reviewed, MME was calculated to with the patient was getting in the hospital as well as with the patient was getting outpatient.  A similar regimen was prescribed with breakthrough medications.     Will monitor for effectiveness.     Update: Spoke to the patient regarding her conversation with IR.  She was told by IR that she does not have many options for dialysis access.  We did explore this.  She states that she was preparing for this in the last couple of years.  She states that this would possibly be a relief as she does not have to make a decision.  We talked about the option of continuing dialysis with her current lying which is causing her pain-and aiming for better pain control with outpatient palliative care.  We also discussed stopping dialysis and implementing comfort measures.  She stated that this is a hard decision, I did validate this.  I did ask if we could bring her partner and daughter in to discuss this with her.  She stated that her partner was coming in tonight after work, and she would discuss her options.  I did update the primary team.  We will continue to  follow.

## 2025-06-15 NOTE — PROGRESS NOTES
"Batsheva Page is a 49 y.o. female on day 7 of admission presenting with Complication associated with dialysis catheter.    Subjective   The patient was seen and examined. Sitting up in bed. Endorses that her pain is better controlled today, albeit still severe.     Objective     Physical Exam  Vitals and nursing note reviewed.   Constitutional:       General: She is not in acute distress.  HENT:      Head: Normocephalic and atraumatic.      Mouth/Throat:      Mouth: Mucous membranes are moist.   Eyes:      Pupils: Pupils are equal, round, and reactive to light.   Cardiovascular:      Rate and Rhythm: Normal rate and regular rhythm.      Heart sounds: Murmur heard.   Pulmonary:      Effort: Pulmonary effort is normal. No respiratory distress.      Breath sounds: Normal breath sounds.   Abdominal:      General: Bowel sounds are normal.   Genitourinary:     Comments: deferred  Musculoskeletal:      Cervical back: Neck supple. No rigidity.      Right lower leg: Edema present.      Comments: RLE cellulitis around the infection site in the upper R leg. There is warmth, tenderness, and swelling present   Skin:     General: Skin is dry.      Capillary Refill: Capillary refill takes 2 to 3 seconds.   Neurological:      Mental Status: She is oriented to person, place, and time.      Motor: Weakness present.     Last Recorded Vitals  Blood pressure 148/80, pulse 64, temperature 36.7 °C (98.1 °F), temperature source Oral, resp. rate 17, height 1.651 m (5' 5\"), weight 69 kg (152 lb 1.9 oz), SpO2 97%.  Intake/Output last 3 Shifts:  I/O last 3 completed shifts:  In: 2062 (29.9 mL/kg) [P.O.:462; I.V.:800 (11.6 mL/kg); Other:800]  Out: 5800 (84.1 mL/kg) [Other:5800]  Weight: 69 kg     Relevant Results  Results for orders placed or performed during the hospital encounter of 06/08/25 (from the past 24 hours)   POCT GLUCOSE   Result Value Ref Range    POCT Glucose 93 74 - 99 mg/dL   POCT GLUCOSE   Result Value Ref Range    POCT " Glucose 137 (H) 74 - 99 mg/dL   Basic Metabolic Panel   Result Value Ref Range    Glucose 81 74 - 99 mg/dL    Sodium 135 (L) 136 - 145 mmol/L    Potassium 4.5 3.5 - 5.3 mmol/L    Chloride 96 (L) 98 - 107 mmol/L    Bicarbonate 26 21 - 32 mmol/L    Anion Gap 18 10 - 20 mmol/L    Urea Nitrogen 26 (H) 6 - 23 mg/dL    Creatinine 4.99 (H) 0.50 - 1.05 mg/dL    eGFR 10 (L) >60 mL/min/1.73m*2    Calcium 7.8 (L) 8.6 - 10.3 mg/dL   CBC   Result Value Ref Range    WBC 4.4 4.4 - 11.3 x10*3/uL    nRBC 0.0 0.0 - 0.0 /100 WBCs    RBC 2.90 (L) 4.00 - 5.20 x10*6/uL    Hemoglobin 9.0 (L) 12.0 - 16.0 g/dL    Hematocrit 28.1 (L) 36.0 - 46.0 %    MCV 97 80 - 100 fL    MCH 31.0 26.0 - 34.0 pg    MCHC 32.0 32.0 - 36.0 g/dL    RDW 15.8 (H) 11.5 - 14.5 %    Platelets 165 150 - 450 x10*3/uL   POCT GLUCOSE   Result Value Ref Range    POCT Glucose 102 (H) 74 - 99 mg/dL         Scheduled medications  Scheduled Medications[1]  Continuous medications  Continuous Medications[2]  PRN medications  PRN Medications[3]     Assessment & Plan  Palliative care encounter  Infected hemodialysis cath  Tunneled cath was exchanged by IR on 6/10.   Having significant pain which is worse than prior - per IR there are no definitive alternative sites to place a catheter, as many of her previously used sites/veins are occluded.   Site, tender, swollen, and warm   Vascular consulted - no further options vascular wise regarding fistula or graft placement.  Blood cultures and Lactate WNL  Wound culture with MRSA   Patient on Vanco - ID following     ESRD on Hemodialysis (limited access - now with a fem tunneled cath)  Dialysis MWF   Current line has good access, The patient has end stage dialysis access  The goal is to limit the pain in the site as another exchange of the site could risk current access       The patient is tolerating dialysis from a fluid removal standpoint, but having severe pain at rest from the line and during dialysis.     CAD/HTN  Medication  management   Nephrology titrating BP meds for better control      Anemia  Of Chronic disease  Monitor H/H     Pain  From dialysis cath  RLS - this is managed with Lyrica and Benadryl at home    6/13/2025  Scheduled Tylenol 650mg every 6 hours  Dilaudid 1 mg as scheduled every 6 hours  Robaxin 500 mg scheduled every 8 hours  Oxy IR 5 mg as needed every 4 hours  Lidocaine 5% topical solution as needed every 4 hours    The patient has been requesting IV Dilaudid and IV Benadryl during dialysis for severe pain  The patient does not have IV access      6/14/2025  IV access was regained last night. PO Dilaudid was discontinued by the primary team and IV Dilaudid was re-ordered.     Current regimen   Scheduled Tylenol 650mg every 6 hours  Robaxin 500 mg scheduled every 8 hours  Oxy IR 5 mg as needed every 4 hours  Lidocaine 5% topical solution as needed every 4 hours  Dilaudid 0.2mg IV q3h as needed for moderate pain  Dilaudid 0.4mg IV q3h as needed for severe pain    Recommendations from palliative care:  Dose PO analgesics to determine a regimen where the patient can transition home. Follow up with [facility] Palliative care to transition home with a team who will manage pain and navigate treatment goals.     MME dose in the past 24h timeframe  45MME  Current home MME dose   30 MME     Recommendations for home dosing  Dilaudid 2mg PO q6h (40MME) OR  Oxycodone 10mg PO q8h (45MME)  Per UptoDate Oxy dosing is not preferable more than 3 times a day - renal patients can develop Oxy toxicity. STRONG Recommendation for Dilaudid to discharge home.     Anxiety/ Depression /Pruritis  As needed Ativan   Benadryl for itching     Palliative Care   DNR CCA DNI   The patient has decision making capacity  Has one child, Xiao, who is her only child and the patient is .   She does have a significant other - Chava OBRIEN in EPIC - POA-HC is incorrect (as it lists her daugther as the patient - I reached out to case management to  assist with completing a new one.      6/15/2025  Discussion with the patient. She states that her hopes are that dialysis gets easier with her current line. We did discuss palliative care outpatient where a team can follow her and help to define treatment goals down the line and  manage her pain. She is agreeable to this. Her pain is better controlled with the current regimen of IV pain medications. Per the primary team the patient understands that she will be sent back home on PO pain medications. The patient states that Oxy or Dilaudid have the same effect and does not have a preference for going home on one of the other. I recommend with ESRD - Dilaudid being optimal. Goals of care are well established. Pain is being managed by the primary team, they have our recommendations, also a referral to palliative care with [facility] has been placed by care coordination.      6/14/2025  I did talk to the patient about adjusting her pain medications today.  She stated that she just wanted IV medications for pain during dialysis and did not want the rest of her pain medication adjusted today.  She states that her dialysis cath is not right.  We did talk alternate options that were given to her such as through the liver or the heart, and the patient is not agreeable to this.  The patient is very somber today due to the news she was given yesterday about there being very limited options for a new dialysis site.  She states that she is not ready to die, and she really needs to think about her options moving forward.  She states she just wants to leave and go home.  We did talk about pain control with leaving here.  We also discussed palliative care with [facility] who can assist with pain control in the outpatient setting.  She states that she needs to think about this.  I did give her a one-time dose of IV Dilaudid during dialysis, as the patient has had this regimen previously this week.  I will follow-up tomorrow to  further reassess goals of care, discharge planning, and pain control.  I did discuss this with the attending team.    6/13/2025  Goals of care discussion with the patient.  We talked about CODE STATUS and this was changed in epic per our conversation.  Discussion regarding goals of care.  The patient is aware of her current condition and trajectory.  We did talk about how long the patient has been on dialysis (21 years) and quality of life.  She stated that she does value quality of life.  She has a stated that being on dialysis for 21 years is very hard.  We asked about systems of support that she stated that her daughter Xiao and brian Larsen is her support system.  He denies depression, but does endorse anxiety.  She stated that she does not know how much longer she can continue to do dialysis given her current quality of life.  She states that the only reason for her to continue is for her daughter and significant other.  We did talk about lack of dialysis access.  The patient states she does understand that her access is limited-and she is waiting for the decision to be made for her to stop dialysis versus her making a decision to stop dialysis.  She states that not having access would be bittersweet-only had a relief that she does not have to make a decision about completing dialysis-and on the other hand knowing that not receiving dialysis is life limiting.     I did adjust her pain regimen -the patient states that she is having pain in the RLE that is a 10 out of 10 and sharp stabbing and burning.  Patient also has lost IV access.     I scheduled Tylenol 650mg every 6 hours  Dilaudid 1 mg as scheduled every 6 hours  Robaxin 500 mg scheduled every 8 hours  Oxy IR 5 mg as needed every 4 hours  Lidocaine 5% topical solution as needed every 4 hours     Patient is getting Benadryl as needed 50 mg for anxiety and Lyrica for restless leg syndrome.     PDMP was reviewed, MME was calculated to with the patient  was getting in the hospital as well as with the patient was getting outpatient.  A similar regimen was prescribed with breakthrough medications.     Will monitor for effectiveness.     Update: Spoke to the patient regarding her conversation with IR.  She was told by IR that she does not have many options for dialysis access.  We did explore this.  She states that she was preparing for this in the last couple of years.  She states that this would possibly be a relief as she does not have to make a decision.  We talked about the option of continuing dialysis with her current lying which is causing her pain-and aiming for better pain control with outpatient palliative care.  We also discussed stopping dialysis and implementing comfort measures.  She stated that this is a hard decision, I did validate this.  I did ask if we could bring her partner and daughter in to discuss this with her.  She stated that her partner was coming in tonight after work, and she would discuss her options.  I did update the primary team.  We will continue to follow.        I spent 50 minutes in the professional and overall care of this patient.      Vannesa He, APRN-CNP         [1] acetaminophen, 650 mg, oral, q6h  amLODIPine, 5 mg, oral, Daily  aspirin, 81 mg, oral, Daily  atorvastatin, 40 mg, oral, Nightly  calcitriol, 0.5 mcg, oral, Daily  carvedilol, 12.5 mg, oral, BID  cloNIDine, 0.3 mg, oral, q8h KIMBERLY  epoetin brandy or biosimilar, 6,000 Units, subcutaneous, Once per day on Monday Wednesday Friday  ergocalciferol, 1.25 mg, oral, Every Sunday  heparin, 5,000 Units, subcutaneous, q12h  heparin flush, 5 mL, intra-catheter, Once  levETIRAcetam, 750 mg, oral, Nightly  lidocaine, 0.1 mL, subcutaneous, Once  methocarbamol, 500 mg, oral, q8h KIMBERLY  pantoprazole, 40 mg, oral, Daily before breakfast  pregabalin, 75 mg, oral, Daily  sevelamer carbonate, 800 mg, oral, TID  sodium chloride, 1 spray, Each Nostril, Before meals &  nightly  sulfamethoxazole-trimethoprim, 1 tablet, oral, BID  vancomycin 5 mg/mL + heparin 2500 units/mL in NS lock, 2 mL, intra-catheter, After Dialysis  vancomycin 5 mg/mL + heparin 2500 units/mL in NS lock, 2 mL, intra-catheter, After Dialysis  [2]    [3] PRN medications: dextrose, dextrose, diphenhydrAMINE, glucagon, glucagon, HYDROmorphone, HYDROmorphone, lidocaine, LORazepam, naloxone, oxyCODONE

## 2025-06-15 NOTE — PROGRESS NOTES
Batsheva Page is a 49 y.o. female on day 7 of admission presenting with Complication associated with dialysis catheter.      Subjective   Pt seen and examined at bedside. Awake/alert/oriented.  Denies chest pain, shortness of breath, fevers, chills, nausea, or vomiting.  Still having pain to dialysis site.  IV access was obtained last night and patient was placed back on IV Dilaudid as needed.     Objective     Last Recorded Vitals  /80 (BP Location: Left arm, Patient Position: Lying)   Pulse 64   Temp 36.7 °C (98.1 °F) (Oral)   Resp 17   Wt 69 kg (152 lb 1.9 oz)   SpO2 97%   Intake/Output last 3 Shifts:    Intake/Output Summary (Last 24 hours) at 6/15/2025 1129  Last data filed at 6/14/2025 2358  Gross per 24 hour   Intake 2062 ml   Output 5800 ml   Net -3738 ml       Admission Weight  Weight: 69.4 kg (153 lb) (06/08/25 1152)    Daily Weight  06/10/25 : 69 kg (152 lb 1.9 oz)    Image Results  IR CVC tunneled  Narrative: Interpreted By:  Melvin Medina,   STUDY:  IR CVC TUNNELED;  6/10/2025 1:08 pm      INDICATION:  Signs/Symptoms:Dislodged HD line.      COMPARISON:  None.      ACCESSION NUMBER(S):  [id number]      ORDERING CLINICIAN:  HALLEY CHAMORRO      TECHNIQUE:  INTERVENTIONALIST(S):  Manuel Medina MD      CONSENT:  The patient/patient's POA/next of kin was informed of the nature of  the proposed procedure. The purposes, alternatives, risks, and  benefits were explained and discussed. All questions were answered  and consent was obtained.      RADIATION EXPOSURE:  Fluoroscopy time: 0.7 min  Dose: 3.72 mGy  Dose Area Product (DAP): 1342 mGy*cm^2      SEDATION:  Mac anesthesia was provided by the department of anesthesiology in  maintained throughout the duration of the procedure.      MEDICATION:  None.      TIME OUT:  A time out was performed immediately prior to procedure start with  the interventional team, correctly identifying the patient name, date  of birth, MRN, procedure, anatomy  (including marking of site and  side), patient position, procedure consent form, relevant laboratory  and imaging test results, antibiotic administration, safety  precautions, and procedure-specific equipment needs.      COMPLICATIONS:  No immediate adverse events identified.      FINDINGS:  In the recumbent position, the patient was positioned on the  angiography table. MAC anesthesia was initiated by the Department of  Anesthesiology and maintained throughout the duration of the  procedure. The right inguinal cutaneous tissues and existing catheter  were prepared and draped in usual sterile manner. An 035 Amplatz wire  was placed through 1 of the lumens of the existing catheter. The  existing catheter was removed.  After Lidocaine 1% local anesthesia,  a 14.5 Somali x 44 cm hemodialysis catheter was then placed with  tract continuity and its central catheter tip(s) to reside at the  cavoatrial junction. A fluoroscopic spot image of the chest was  acquired in the AP projection to confirm optimal location. The  catheter ports were aspirated and flushed without resistance with  normal saline. The catheter ports were then charged with  high-concentration heparin. The external portions of the catheter  were secured with a purse-string 2-0 polypropylene suture and sterile  dressings.      The patient tolerated the procedure without complication.      Impression: 1. Uncomplicated exchange of a tunneled right femoral 14.5 Fr x 44 cm  hemodialysis catheter. The catheter is ready for immediate use.      Performed and dictated at [facility].      MACRO:  None.      Signed by: Melvin Medina 6/10/2025 4:02 PM  Dictation workstation:   [id number]      Physical Exam  Vitals and nursing note reviewed.   Constitutional:       General: She is not in acute distress.     Appearance: Normal appearance. She is not toxic-appearing.   HENT:      Head: Normocephalic and atraumatic.      Nose: Nose  normal.      Mouth/Throat:      Mouth: Mucous membranes are moist.   Eyes:      General: Lids are normal.      Extraocular Movements: Extraocular movements intact.      Conjunctiva/sclera: Conjunctivae normal.   Cardiovascular:      Rate and Rhythm: Normal rate and regular rhythm.   Pulmonary:      Effort: Pulmonary effort is normal.      Breath sounds: Normal breath sounds. No wheezing, rhonchi or rales.   Abdominal:      General: Bowel sounds are normal.      Palpations: Abdomen is soft.      Tenderness: There is no abdominal tenderness.   Musculoskeletal:         General: Normal range of motion.      Cervical back: Normal range of motion and neck supple.   Skin:     General: Skin is warm and dry.      Capillary Refill: Capillary refill takes less than 2 seconds.   Neurological:      General: No focal deficit present.      Mental Status: She is alert and oriented to person, place, and time.       Relevant Results  Lab Results   Component Value Date    GLUCOSE 81 06/15/2025    CALCIUM 7.8 (L) 06/15/2025     (L) 06/15/2025    K 4.5 06/15/2025    CO2 26 06/15/2025    CL 96 (L) 06/15/2025    BUN 26 (H) 06/15/2025    CREATININE 4.99 (H) 06/15/2025      Lab Results   Component Value Date    WBC 4.4 06/15/2025    HGB 9.0 (L) 06/15/2025    HCT 28.1 (L) 06/15/2025    MCV 97 06/15/2025     06/15/2025        CBC, BMP, Vital signs reviewed    Assessment and Plan    Infected hemodialysis catheter   per patient HD line sliding noted erythema and purulent drainage  -blood cultures negative to date  - wound culture MRSA  - ID consultation following, appreciate recs.  Plan for Bactrim and vancomycin dwell  - Status post hemodialysis catheter exchange on 6/10/2025.  Still complaining of severe pain to new hemodialysis catheter site.    -Patient has been seen by vascular surgery and interventional radiology.  Unfortunately patient has no other options for access.  Recommendation is to use the dialysis catheter in place  however it is causing her significant pain.  Palliative care has been following and medications are being adjusted, appreciate recommendations     ESRD on hemodialysis  hyperkalemia resolved  - HD-> M/W/F   - nephrology consult placed  - Patient unable to complete full dialysis on 6/10/2025, HD 6/12/25  - Hemodialysis per nephrology     HTN  - Continue antihypertensives  - Monitor blood pressure close     Anemia  - Likely secondary to CKD vs ABNER  - Check iron panel-reviewed, occult stool  - Hemoglobin 9.2-stable  - PPI     Seizure  - resumed home medication  - seizure precaution     CAD  - ASA and statin  - no chest pain currently     Anxiety/depression  - resumed home medications      GI prophylaxis  -PPI     DVT prophylaxis  - subcutaneous heparin     Epistaxis  - Nasal spray  - Monitor H&H  - ENT consult if needed     Plan  Monitor on telemetry  Monitor fluid/electrolytes close  Hemodialysis per nephrology  Continue antibiotics, infectious disease following.  Recommendation for Bactrim and vancomycin dwell  DVT prophylaxis  CBC and BMP in AM  Pain management  Palliative care following    Plan for discharge home with palliative care referral when able to tolerate HD   Discussed with palliative care nurse practitioner.     Noelle Doss, APRN-CNP

## 2025-06-16 ENCOUNTER — APPOINTMENT (OUTPATIENT)
Dept: DIALYSIS | Facility: HOSPITAL | Age: 50
End: 2025-06-16
Payer: MEDICARE

## 2025-06-16 LAB
ANION GAP SERPL CALCULATED.3IONS-SCNC: 17 MMOL/L (ref 10–20)
BUN SERPL-MCNC: 38 MG/DL (ref 6–23)
CALCIUM SERPL-MCNC: 8.1 MG/DL (ref 8.6–10.3)
CHLORIDE SERPL-SCNC: 98 MMOL/L (ref 98–107)
CO2 SERPL-SCNC: 25 MMOL/L (ref 21–32)
CREAT SERPL-MCNC: 6.25 MG/DL (ref 0.5–1.05)
EGFRCR SERPLBLD CKD-EPI 2021: 8 ML/MIN/1.73M*2
ERYTHROCYTE [DISTWIDTH] IN BLOOD BY AUTOMATED COUNT: 15.5 % (ref 11.5–14.5)
GLUCOSE BLD MANUAL STRIP-MCNC: 84 MG/DL (ref 74–99)
GLUCOSE SERPL-MCNC: 86 MG/DL (ref 74–99)
HCT VFR BLD AUTO: 29.6 % (ref 36–46)
HGB BLD-MCNC: 9.4 G/DL (ref 12–16)
MCH RBC QN AUTO: 30.4 PG (ref 26–34)
MCHC RBC AUTO-ENTMCNC: 31.8 G/DL (ref 32–36)
MCV RBC AUTO: 96 FL (ref 80–100)
NRBC BLD-RTO: 0 /100 WBCS (ref 0–0)
PLATELET # BLD AUTO: 189 X10*3/UL (ref 150–450)
POTASSIUM SERPL-SCNC: 5.1 MMOL/L (ref 3.5–5.3)
RBC # BLD AUTO: 3.09 X10*6/UL (ref 4–5.2)
SODIUM SERPL-SCNC: 135 MMOL/L (ref 136–145)
WBC # BLD AUTO: 4.6 X10*3/UL (ref 4.4–11.3)

## 2025-06-16 PROCEDURE — 2500000002 HC RX 250 W HCPCS SELF ADMINISTERED DRUGS (ALT 637 FOR MEDICARE OP, ALT 636 FOR OP/ED): Performed by: INTERNAL MEDICINE

## 2025-06-16 PROCEDURE — A4217 STERILE WATER/SALINE, 500 ML: HCPCS | Performed by: INTERNAL MEDICINE

## 2025-06-16 PROCEDURE — 99232 SBSQ HOSP IP/OBS MODERATE 35: CPT | Performed by: NURSE PRACTITIONER

## 2025-06-16 PROCEDURE — 2500000001 HC RX 250 WO HCPCS SELF ADMINISTERED DRUGS (ALT 637 FOR MEDICARE OP)

## 2025-06-16 PROCEDURE — 1200000002 HC GENERAL ROOM WITH TELEMETRY DAILY

## 2025-06-16 PROCEDURE — 6350000001 HC RX 635 EPOETIN >10,000 UNITS: Performed by: INTERNAL MEDICINE

## 2025-06-16 PROCEDURE — 2500000001 HC RX 250 WO HCPCS SELF ADMINISTERED DRUGS (ALT 637 FOR MEDICARE OP): Performed by: NURSE PRACTITIONER

## 2025-06-16 PROCEDURE — 8010000001 HC DIALYSIS - HEMODIALYSIS PER DAY

## 2025-06-16 PROCEDURE — 80048 BASIC METABOLIC PNL TOTAL CA: CPT | Performed by: NURSE PRACTITIONER

## 2025-06-16 PROCEDURE — 82947 ASSAY GLUCOSE BLOOD QUANT: CPT

## 2025-06-16 PROCEDURE — 36415 COLL VENOUS BLD VENIPUNCTURE: CPT | Performed by: NURSE PRACTITIONER

## 2025-06-16 PROCEDURE — 2500000004 HC RX 250 GENERAL PHARMACY W/ HCPCS (ALT 636 FOR OP/ED): Performed by: INTERNAL MEDICINE

## 2025-06-16 PROCEDURE — 2500000004 HC RX 250 GENERAL PHARMACY W/ HCPCS (ALT 636 FOR OP/ED): Performed by: NURSE PRACTITIONER

## 2025-06-16 PROCEDURE — 2500000001 HC RX 250 WO HCPCS SELF ADMINISTERED DRUGS (ALT 637 FOR MEDICARE OP): Performed by: INTERNAL MEDICINE

## 2025-06-16 PROCEDURE — 85027 COMPLETE CBC AUTOMATED: CPT | Performed by: NURSE PRACTITIONER

## 2025-06-16 RX ADMIN — SULFAMETHOXAZOLE AND TRIMETHOPRIM 1 TABLET: 400; 80 TABLET ORAL at 08:05

## 2025-06-16 RX ADMIN — HYDROMORPHONE HYDROCHLORIDE 0.4 MG: 1 INJECTION, SOLUTION INTRAMUSCULAR; INTRAVENOUS; SUBCUTANEOUS at 11:51

## 2025-06-16 RX ADMIN — CALCITRIOL CAPSULES 0.25 MCG 0.5 MCG: 0.25 CAPSULE ORAL at 08:05

## 2025-06-16 RX ADMIN — CLONIDINE HYDROCHLORIDE 0.3 MG: 0.3 TABLET ORAL at 06:45

## 2025-06-16 RX ADMIN — METHOCARBAMOL TABLETS 500 MG: 500 TABLET, COATED ORAL at 15:20

## 2025-06-16 RX ADMIN — METHOCARBAMOL TABLETS 500 MG: 500 TABLET, COATED ORAL at 21:04

## 2025-06-16 RX ADMIN — DIPHENHYDRAMINE HYDROCHLORIDE 50 MG: 50 INJECTION, SOLUTION INTRAMUSCULAR; INTRAVENOUS at 15:20

## 2025-06-16 RX ADMIN — ACETAMINOPHEN 650 MG: 325 TABLET ORAL at 13:06

## 2025-06-16 RX ADMIN — HYDROMORPHONE HYDROCHLORIDE 0.4 MG: 1 INJECTION, SOLUTION INTRAMUSCULAR; INTRAVENOUS; SUBCUTANEOUS at 15:19

## 2025-06-16 RX ADMIN — SULFAMETHOXAZOLE AND TRIMETHOPRIM 1 TABLET: 400; 80 TABLET ORAL at 21:03

## 2025-06-16 RX ADMIN — AMLODIPINE BESYLATE 5 MG: 5 TABLET ORAL at 08:05

## 2025-06-16 RX ADMIN — DIPHENHYDRAMINE HYDROCHLORIDE 50 MG: 50 INJECTION, SOLUTION INTRAMUSCULAR; INTRAVENOUS at 22:20

## 2025-06-16 RX ADMIN — CLONIDINE HYDROCHLORIDE 0.3 MG: 0.3 TABLET ORAL at 15:20

## 2025-06-16 RX ADMIN — HYDROMORPHONE HYDROCHLORIDE 0.4 MG: 1 INJECTION, SOLUTION INTRAMUSCULAR; INTRAVENOUS; SUBCUTANEOUS at 22:20

## 2025-06-16 RX ADMIN — EPOETIN ALFA-EPBX 6000 UNITS: 20000 INJECTION, SOLUTION INTRAVENOUS; SUBCUTANEOUS at 17:39

## 2025-06-16 RX ADMIN — ACETAMINOPHEN 650 MG: 325 TABLET ORAL at 00:27

## 2025-06-16 RX ADMIN — CLONIDINE HYDROCHLORIDE 0.3 MG: 0.3 TABLET ORAL at 21:03

## 2025-06-16 RX ADMIN — HYDROMORPHONE HYDROCHLORIDE 0.4 MG: 1 INJECTION, SOLUTION INTRAMUSCULAR; INTRAVENOUS; SUBCUTANEOUS at 08:01

## 2025-06-16 RX ADMIN — DIPHENHYDRAMINE HYDROCHLORIDE 50 MG: 50 INJECTION, SOLUTION INTRAMUSCULAR; INTRAVENOUS at 00:27

## 2025-06-16 RX ADMIN — DIPHENHYDRAMINE HYDROCHLORIDE 50 MG: 50 INJECTION, SOLUTION INTRAMUSCULAR; INTRAVENOUS at 08:02

## 2025-06-16 RX ADMIN — METHOCARBAMOL TABLETS 500 MG: 500 TABLET, COATED ORAL at 05:30

## 2025-06-16 RX ADMIN — VANCOMYCIN HYDROCHLORIDE 2 ML: 500 INJECTION, POWDER, LYOPHILIZED, FOR SOLUTION INTRAVENOUS at 13:09

## 2025-06-16 RX ADMIN — ACETAMINOPHEN 650 MG: 325 TABLET ORAL at 05:30

## 2025-06-16 RX ADMIN — DIPHENHYDRAMINE HYDROCHLORIDE 50 MG: 50 INJECTION, SOLUTION INTRAMUSCULAR; INTRAVENOUS at 04:46

## 2025-06-16 RX ADMIN — DIPHENHYDRAMINE HYDROCHLORIDE 50 MG: 50 INJECTION, SOLUTION INTRAMUSCULAR; INTRAVENOUS at 19:10

## 2025-06-16 RX ADMIN — SEVELAMER CARBONATE 800 MG: 800 TABLET, FILM COATED ORAL at 08:05

## 2025-06-16 RX ADMIN — ACETAMINOPHEN 650 MG: 325 TABLET ORAL at 17:39

## 2025-06-16 RX ADMIN — HYDROMORPHONE HYDROCHLORIDE 0.4 MG: 1 INJECTION, SOLUTION INTRAMUSCULAR; INTRAVENOUS; SUBCUTANEOUS at 04:46

## 2025-06-16 RX ADMIN — HYDROMORPHONE HYDROCHLORIDE 0.4 MG: 1 INJECTION, SOLUTION INTRAMUSCULAR; INTRAVENOUS; SUBCUTANEOUS at 00:27

## 2025-06-16 RX ADMIN — HYDROMORPHONE HYDROCHLORIDE 0.4 MG: 1 INJECTION, SOLUTION INTRAMUSCULAR; INTRAVENOUS; SUBCUTANEOUS at 19:10

## 2025-06-16 RX ADMIN — ASPIRIN 81 MG: 81 TABLET, COATED ORAL at 08:04

## 2025-06-16 RX ADMIN — DIPHENHYDRAMINE HYDROCHLORIDE 50 MG: 50 INJECTION, SOLUTION INTRAMUSCULAR; INTRAVENOUS at 11:51

## 2025-06-16 RX ADMIN — CARVEDILOL 12.5 MG: 12.5 TABLET, FILM COATED ORAL at 21:04

## 2025-06-16 RX ADMIN — CARVEDILOL 12.5 MG: 12.5 TABLET, FILM COATED ORAL at 08:05

## 2025-06-16 ASSESSMENT — PAIN DESCRIPTION - DESCRIPTORS
DESCRIPTORS: ACHING
DESCRIPTORS: STABBING
DESCRIPTORS: CRAMPING
DESCRIPTORS: STABBING
DESCRIPTORS: ACHING

## 2025-06-16 ASSESSMENT — PAIN DESCRIPTION - ORIENTATION
ORIENTATION: RIGHT

## 2025-06-16 ASSESSMENT — PAIN DESCRIPTION - LOCATION
LOCATION: GROIN

## 2025-06-16 ASSESSMENT — PAIN SCALES - GENERAL
PAINLEVEL_OUTOF10: 10 - WORST POSSIBLE PAIN
PAINLEVEL_OUTOF10: 9
PAINLEVEL_OUTOF10: 10 - WORST POSSIBLE PAIN
PAINLEVEL_OUTOF10: 10 - WORST POSSIBLE PAIN
PAINLEVEL_OUTOF10: 9
PAINLEVEL_OUTOF10: 10 - WORST POSSIBLE PAIN
PAINLEVEL_OUTOF10: 9

## 2025-06-16 ASSESSMENT — COGNITIVE AND FUNCTIONAL STATUS - GENERAL
MOBILITY SCORE: 24
DAILY ACTIVITIY SCORE: 24

## 2025-06-16 NOTE — CARE PLAN
Problem: Skin  Goal: Participates in plan/prevention/treatment measures  Outcome: Progressing  Flowsheets (Taken 6/16/2025 1015)  Participates in plan/prevention/treatment measures: Increase activity/out of bed for meals     Problem: Skin  Goal: Prevent/manage excess moisture  Outcome: Progressing  Flowsheets (Taken 6/16/2025 1015)  Prevent/manage excess moisture:   Monitor for/manage infection if present   Follow provider orders for dressing changes     Problem: Skin  Goal: Promote skin healing  Outcome: Progressing  Flowsheets (Taken 6/16/2025 1015)  Promote skin healing:   Ensure correct size (line/device) and apply per  instructions   Assess skin/pad under line(s)/device(s)     Problem: Skin  Goal: Decreased wound size/increased tissue granulation at next dressing change  Outcome: Progressing  Flowsheets (Taken 6/16/2025 1015)  Decreased wound size/increased tissue granulation at next dressing change: Protective dressings over bony prominences     Problem: Skin  Goal: Promote/optimize nutrition  Outcome: Progressing  Flowsheets (Taken 6/16/2025 1015)  Promote/optimize nutrition:   Consume > 50% meals/supplements   Monitor/record intake including meals   Offer water/supplements/favorite foods   The patient's goals for the shift include pain management and safety    The clinical goals for the shift include remain safe

## 2025-06-16 NOTE — POST-PROCEDURE NOTE
Report to Receiving RN:    Report To: FARAZ Trejo  Time Report Called: [id number]  Hand-Off Communication: Patient requested off dialysis 10min early, Dr LIBIA Pedersen notified/approved. 1.9L removed, BP reading 145/84 HR 71. Catheter ports were locked with saline only, capped securely. I am sending her with another set of caps so you can put the vanco dwell in and cap it off again. Patient is transport to return to room.   Complications During Treatment: No  Ultrafiltration Treatment: Yes, 1.9L  Medications Administered During Dialysis: Yes, administered by FARAZ Chadwick  Blood Products Administered During Dialysis: No  Labs Sent During Dialysis: No  Heparin Drip Rate Changes: N/A  Dialysis Catheter Dressing: clean,dry and intact  Last Dressing Change: 6/12/25    Electronic Signatures:  Heidy HANNA

## 2025-06-16 NOTE — PROGRESS NOTES
"Batsheva Page is a 49 y.o. female on day 8 of admission presenting with Complication associated with dialysis catheter.    Subjective   Seen for end-stage kidney disease and dialysis therapy she is seen and examined on dialysis therapy tolerating procedure well has pain at the catheter site but she is medicated and looks comfortable at this time       Objective     Physical Exam  Neck:      Vascular: No carotid bruit.   Cardiovascular:      Rate and Rhythm: Normal rate and regular rhythm.      Heart sounds: No murmur heard.     No friction rub. No gallop.   Pulmonary:      Breath sounds: No wheezing, rhonchi or rales.   Chest:      Chest wall: No tenderness.   Abdominal:      General: There is no distension.      Tenderness: There is no abdominal tenderness. There is no guarding or rebound.   Musculoskeletal:         General: No swelling or tenderness.      Cervical back: Neck supple.      Right lower leg: No edema.      Left lower leg: No edema.   Lymphadenopathy:      Cervical: No cervical adenopathy.         Last Recorded Vitals  Blood pressure 127/73, pulse 66, temperature 35.6 °C (96.1 °F), temperature source Temporal, resp. rate 16, height 1.651 m (5' 5\"), weight 69 kg (152 lb 1.9 oz), SpO2 100%.    Intake/Output last 3 Shifts:  I/O last 3 completed shifts:  In: 722 (10.5 mL/kg) [P.O.:722]  Out: - (0 mL/kg)   Weight: 69 kg   Current Medications[1]   Relevant Results    Results for orders placed or performed during the hospital encounter of 06/08/25 (from the past 96 hours)   POCT GLUCOSE   Result Value Ref Range    POCT Glucose 94 74 - 99 mg/dL   POCT GLUCOSE   Result Value Ref Range    POCT Glucose 109 (H) 74 - 99 mg/dL   CBC   Result Value Ref Range    WBC 4.0 (L) 4.4 - 11.3 x10*3/uL    nRBC 0.0 0.0 - 0.0 /100 WBCs    RBC 3.07 (L) 4.00 - 5.20 x10*6/uL    Hemoglobin 9.5 (L) 12.0 - 16.0 g/dL    Hematocrit 30.8 (L) 36.0 - 46.0 %     80 - 100 fL    MCH 30.9 26.0 - 34.0 pg    MCHC 30.8 (L) 32.0 - " 36.0 g/dL    RDW 15.8 (H) 11.5 - 14.5 %    Platelets 142 (L) 150 - 450 x10*3/uL   Basic Metabolic Panel   Result Value Ref Range    Glucose 91 74 - 99 mg/dL    Sodium 137 136 - 145 mmol/L    Potassium 4.3 3.5 - 5.3 mmol/L    Chloride 98 98 - 107 mmol/L    Bicarbonate 27 21 - 32 mmol/L    Anion Gap 16 10 - 20 mmol/L    Urea Nitrogen 28 (H) 6 - 23 mg/dL    Creatinine 5.48 (H) 0.50 - 1.05 mg/dL    eGFR 9 (L) >60 mL/min/1.73m*2    Calcium 8.4 (L) 8.6 - 10.3 mg/dL   POCT GLUCOSE   Result Value Ref Range    POCT Glucose 130 (H) 74 - 99 mg/dL   POCT GLUCOSE   Result Value Ref Range    POCT Glucose 106 (H) 74 - 99 mg/dL   POCT GLUCOSE   Result Value Ref Range    POCT Glucose 98 74 - 99 mg/dL   POCT GLUCOSE   Result Value Ref Range    POCT Glucose 131 (H) 74 - 99 mg/dL   CBC   Result Value Ref Range    WBC 4.7 4.4 - 11.3 x10*3/uL    nRBC 0.0 0.0 - 0.0 /100 WBCs    RBC 2.95 (L) 4.00 - 5.20 x10*6/uL    Hemoglobin 9.1 (L) 12.0 - 16.0 g/dL    Hematocrit 28.5 (L) 36.0 - 46.0 %    MCV 97 80 - 100 fL    MCH 30.8 26.0 - 34.0 pg    MCHC 31.9 (L) 32.0 - 36.0 g/dL    RDW 15.8 (H) 11.5 - 14.5 %    Platelets 153 150 - 450 x10*3/uL   Renal Function Panel   Result Value Ref Range    Glucose 83 74 - 99 mg/dL    Sodium 136 136 - 145 mmol/L    Potassium 4.6 3.5 - 5.3 mmol/L    Chloride 97 (L) 98 - 107 mmol/L    Bicarbonate 26 21 - 32 mmol/L    Anion Gap 18 10 - 20 mmol/L    Urea Nitrogen 42 (H) 6 - 23 mg/dL    Creatinine 7.05 (H) 0.50 - 1.05 mg/dL    eGFR 7 (L) >60 mL/min/1.73m*2    Calcium 7.6 (L) 8.6 - 10.3 mg/dL    Phosphorus 5.2 (H) 2.5 - 4.9 mg/dL    Albumin 3.6 3.4 - 5.0 g/dL   POCT GLUCOSE   Result Value Ref Range    POCT Glucose 102 (H) 74 - 99 mg/dL   POCT GLUCOSE   Result Value Ref Range    POCT Glucose 97 74 - 99 mg/dL   POCT GLUCOSE   Result Value Ref Range    POCT Glucose 93 74 - 99 mg/dL   POCT GLUCOSE   Result Value Ref Range    POCT Glucose 137 (H) 74 - 99 mg/dL   Basic Metabolic Panel   Result Value Ref Range    Glucose 81 74  - 99 mg/dL    Sodium 135 (L) 136 - 145 mmol/L    Potassium 4.5 3.5 - 5.3 mmol/L    Chloride 96 (L) 98 - 107 mmol/L    Bicarbonate 26 21 - 32 mmol/L    Anion Gap 18 10 - 20 mmol/L    Urea Nitrogen 26 (H) 6 - 23 mg/dL    Creatinine 4.99 (H) 0.50 - 1.05 mg/dL    eGFR 10 (L) >60 mL/min/1.73m*2    Calcium 7.8 (L) 8.6 - 10.3 mg/dL   CBC   Result Value Ref Range    WBC 4.4 4.4 - 11.3 x10*3/uL    nRBC 0.0 0.0 - 0.0 /100 WBCs    RBC 2.90 (L) 4.00 - 5.20 x10*6/uL    Hemoglobin 9.0 (L) 12.0 - 16.0 g/dL    Hematocrit 28.1 (L) 36.0 - 46.0 %    MCV 97 80 - 100 fL    MCH 31.0 26.0 - 34.0 pg    MCHC 32.0 32.0 - 36.0 g/dL    RDW 15.8 (H) 11.5 - 14.5 %    Platelets 165 150 - 450 x10*3/uL   POCT GLUCOSE   Result Value Ref Range    POCT Glucose 102 (H) 74 - 99 mg/dL   POCT GLUCOSE   Result Value Ref Range    POCT Glucose 116 (H) 74 - 99 mg/dL   POCT GLUCOSE   Result Value Ref Range    POCT Glucose 107 (H) 74 - 99 mg/dL   POCT GLUCOSE   Result Value Ref Range    POCT Glucose 150 (H) 74 - 99 mg/dL   Basic Metabolic Panel   Result Value Ref Range    Glucose 86 74 - 99 mg/dL    Sodium 135 (L) 136 - 145 mmol/L    Potassium 5.1 3.5 - 5.3 mmol/L    Chloride 98 98 - 107 mmol/L    Bicarbonate 25 21 - 32 mmol/L    Anion Gap 17 10 - 20 mmol/L    Urea Nitrogen 38 (H) 6 - 23 mg/dL    Creatinine 6.25 (H) 0.50 - 1.05 mg/dL    eGFR 8 (L) >60 mL/min/1.73m*2    Calcium 8.1 (L) 8.6 - 10.3 mg/dL   CBC   Result Value Ref Range    WBC 4.6 4.4 - 11.3 x10*3/uL    nRBC 0.0 0.0 - 0.0 /100 WBCs    RBC 3.09 (L) 4.00 - 5.20 x10*6/uL    Hemoglobin 9.4 (L) 12.0 - 16.0 g/dL    Hematocrit 29.6 (L) 36.0 - 46.0 %    MCV 96 80 - 100 fL    MCH 30.4 26.0 - 34.0 pg    MCHC 31.8 (L) 32.0 - 36.0 g/dL    RDW 15.5 (H) 11.5 - 14.5 %    Platelets 189 150 - 450 x10*3/uL       Assessment/Plan   End-stage renal disease on hemodialysis plan to continue dialysis Monday Wednesday Friday the patient is showing some interest in peritoneal dialysis I will refer her to general surgery for  evaluation of placement of a peritoneal dialysis catheter especially with her extensive history of infection in the past  Hyperkalemia, resolved  Hypertension  Anemia continue with Epogen therapy  Dialysis catheter malfunction status post replacement    Zhou Pedersen MD         [1]   Current Facility-Administered Medications:     acetaminophen (Tylenol) tablet 650 mg, 650 mg, oral, q6h, ORI Goff-CNP, 650 mg at 06/16/25 0530    amLODIPine (Norvasc) tablet 5 mg, 5 mg, oral, Daily, Vu Pedersen MD, 5 mg at 06/16/25 0805    aspirin EC tablet 81 mg, 81 mg, oral, Daily, Orstua Orly, APRN-CNP, 81 mg at 06/16/25 0804    atorvastatin (Lipitor) tablet 40 mg, 40 mg, oral, Nightly, Orsolya Dameron, APRN-CNP, 40 mg at 06/11/25 2159    calcitriol (Rocaltrol) capsule 0.5 mcg, 0.5 mcg, oral, Daily, ORI Platt-CNP, 0.5 mcg at 06/16/25 0805    carvedilol (Coreg) tablet 12.5 mg, 12.5 mg, oral, BID, Hetal Villalba, APRN-CNP, 12.5 mg at 06/16/25 0805    cloNIDine (Catapres) tablet 0.3 mg, 0.3 mg, oral, q8h KIMBERLY, Vu Pedersen MD, 0.3 mg at 06/16/25 0645    dextrose 50 % injection 12.5 g, 12.5 g, intravenous, q15 min PRN, ORI Kincaid-CNP    dextrose 50 % injection 25 g, 25 g, intravenous, q15 min PRN, ORI Kincaid-CNP    diphenhydrAMINE (BENADryl) injection 50 mg, 50 mg, intravenous, q3h PRN, ORI Kincaid-CNP, 50 mg at 06/16/25 0802    epoetin brandy-epbx (Retacrit) injection 6,000 Units, 6,000 Units, subcutaneous, Once per day on Monday Wednesday Friday, Vu Pedersen MD, 6,000 Units at 06/14/25 1543    ergocalciferol (Vitamin D-2) capsule 1.25 mg, 1.25 mg, oral, Every Sunday, ANGELA Platt    glucagon (Glucagen) injection 1 mg, 1 mg, intramuscular, q15 min PRN, ANGELA Kincaid    glucagon (Glucagen) injection 1 mg, 1 mg, intramuscular, q15 min PRN, ANGELA Kincaid    heparin (porcine) injection 5,000 Units, 5,000 Units, subcutaneous, q12h, ANGELA Platt,  5,000 Units at 06/11/25 0544    heparin flush 100 unit/mL syringe 500 Units, 5 mL, intra-catheter, Once, ANGELA Mike    HYDROmorphone (Dilaudid) injection 0.2 mg, 0.2 mg, intravenous, q3h PRN, Everett Lopez DO    HYDROmorphone (Dilaudid) injection 0.4 mg, 0.4 mg, intravenous, q3h PRN, Everett Lopez DO, 0.4 mg at 06/16/25 0801    levETIRAcetam (Keppra) tablet 750 mg, 750 mg, oral, Nightly, Orsolya Orly, APRN-CNP, 750 mg at 06/11/25 2159    lidocaine (Xylocaine) 10 mg/mL (1 %) injection 0.1 mL, 0.1 mL, subcutaneous, Once, Chava Fernández MD    lidocaine (Xylocaine) 5 % ointment 1 Application, 1 Application, Topical, q6h PRN, ANGELA Goff    LORazepam (Ativan) tablet 0.5 mg, 0.5 mg, oral, q6h PRN, Noelle Doss, ORI-CNP, 0.5 mg at 06/14/25 1503    methocarbamol (Robaxin) tablet 500 mg, 500 mg, oral, q8h KIMBERLY, Vannesa He APRKYAE-CNP, 500 mg at 06/16/25 0530    naloxone (Narcan) injection 0.2 mg, 0.2 mg, subcutaneous, q5 min PRN, ORI Goff-CNP    oxyCODONE (Roxicodone) immediate release tablet 5 mg, 5 mg, oral, q4h PRN, Vannesa He APRN-CNP, 5 mg at 06/14/25 0301    pantoprazole (ProtoNix) EC tablet 40 mg, 40 mg, oral, Daily before breakfast, Robolya Orly, APRN-CNP    pregabalin (Lyrica) capsule 75 mg, 75 mg, oral, Daily, Orsolya Orly, APRN-CNP, 75 mg at 06/15/25 0814    sevelamer carbonate (Renvela) tablet 800 mg, 800 mg, oral, TID, Rami M Azem, MD, 800 mg at 06/16/25 0805    sodium chloride (Ocean) 0.65 % nasal spray 1 spray, 1 spray, Each Nostril, Before meals & nightly, Noelle Doss, ORI-CNP, 1 spray at 06/15/25 2124    sulfamethoxazole-trimethoprim (Bactrim) 400-80 mg per tablet 1 tablet, 1 tablet, oral, BID, Adama Soto MD, 1 tablet at 06/16/25 0805    vancomycin 5 mg/mL + heparin 2500 units/mL in NS lock, 2 mL, intra-catheter, After Dialysis, Vu Pedersen MD    vancomycin 5 mg/mL + heparin 2500 units/mL in NS lock, 2 mL, intra-catheter, After  Dialysis, Vu Pedersen MD

## 2025-06-16 NOTE — PROGRESS NOTES
Anticipate discharge soon. At the time of discharge, patient will return home. [facility] was consulted for palliative care once she is home. Will follow.      06/16/25 1110   Discharge Planning   Home or Post Acute Services None   Expected Discharge Disposition Home   Does the patient need discharge transport arranged? No

## 2025-06-16 NOTE — PROGRESS NOTES
"Batsheva Page is a 49 y.o. female on day 8 of admission presenting with Complication associated with dialysis catheter.    Subjective   Patient resting in bed. States pain is managed with current regimen most of the time. She does have increased pain during dialysis. Denies chest pain, shortness of breath, abdominal pain, nausea, fevers, chills.        Objective     Physical Exam  Constitutional:       Appearance: She is ill-appearing.   HENT:      Head: Normocephalic and atraumatic.      Mouth/Throat:      Mouth: Mucous membranes are moist.      Pharynx: Oropharynx is clear.   Eyes:      Extraocular Movements: Extraocular movements intact.      Conjunctiva/sclera: Conjunctivae normal.      Pupils: Pupils are equal, round, and reactive to light.   Cardiovascular:      Rate and Rhythm: Normal rate and regular rhythm.      Pulses: Normal pulses.      Heart sounds: Normal heart sounds.   Pulmonary:      Effort: Pulmonary effort is normal.      Breath sounds: Normal breath sounds.   Abdominal:      General: Bowel sounds are normal.      Palpations: Abdomen is soft.      Tenderness: There is no abdominal tenderness.   Musculoskeletal:         General: Normal range of motion.      Cervical back: Normal range of motion and neck supple.   Skin:     General: Skin is warm and dry.      Capillary Refill: Capillary refill takes less than 2 seconds.   Neurological:      General: No focal deficit present.      Mental Status: She is alert and oriented to person, place, and time.   Psychiatric:         Mood and Affect: Affect is tearful.         Behavior: Behavior normal.       Last Recorded Vitals  Blood pressure 144/76, pulse 71, temperature 36.2 °C (97.2 °F), temperature source Temporal, resp. rate 16, height 1.651 m (5' 5\"), weight 69 kg (152 lb 1.9 oz), SpO2 100%.  Intake/Output last 3 Shifts:  I/O last 3 completed shifts:  In: 722 (10.5 mL/kg) [P.O.:722]  Out: - (0 mL/kg)   Weight: 69 kg     Relevant Results  Results " for orders placed or performed during the hospital encounter of 06/08/25 (from the past 24 hours)   POCT GLUCOSE   Result Value Ref Range    POCT Glucose 107 (H) 74 - 99 mg/dL   POCT GLUCOSE   Result Value Ref Range    POCT Glucose 150 (H) 74 - 99 mg/dL   Basic Metabolic Panel   Result Value Ref Range    Glucose 86 74 - 99 mg/dL    Sodium 135 (L) 136 - 145 mmol/L    Potassium 5.1 3.5 - 5.3 mmol/L    Chloride 98 98 - 107 mmol/L    Bicarbonate 25 21 - 32 mmol/L    Anion Gap 17 10 - 20 mmol/L    Urea Nitrogen 38 (H) 6 - 23 mg/dL    Creatinine 6.25 (H) 0.50 - 1.05 mg/dL    eGFR 8 (L) >60 mL/min/1.73m*2    Calcium 8.1 (L) 8.6 - 10.3 mg/dL   CBC   Result Value Ref Range    WBC 4.6 4.4 - 11.3 x10*3/uL    nRBC 0.0 0.0 - 0.0 /100 WBCs    RBC 3.09 (L) 4.00 - 5.20 x10*6/uL    Hemoglobin 9.4 (L) 12.0 - 16.0 g/dL    Hematocrit 29.6 (L) 36.0 - 46.0 %    MCV 96 80 - 100 fL    MCH 30.4 26.0 - 34.0 pg    MCHC 31.8 (L) 32.0 - 36.0 g/dL    RDW 15.5 (H) 11.5 - 14.5 %    Platelets 189 150 - 450 x10*3/uL     CT abdomen pelvis wo IV contrast  Result Date: 6/15/2025  Interpreted By:  Melvin Williamson, STUDY: CT ABDOMEN PELVIS WO IV CONTRAST;  6/15/2025 1:10 pm   INDICATION: 48 y/o   F with  Signs/Symptoms:femoral dialysis line placement-pain.     LIMITATIONS: Evaluation of the solid organs is limited due to non use of IV contrast.   ACCESSION NUMBER(S): [id number]   ORDERING CLINICIAN: PAT BRYAN   TECHNIQUE: Thin-section noncontrast spiral axial images were obtained from the xiphoid down through the symphysis pubis. Sagittal and coronal reconstruction images were generated. Bone, mediastinal, lung, and liver windows were reviewed.   COMPARISON: Prior exam from 12/02/2024   FINDINGS: LUNG BASES: Small left pleural effusion with mild associated left basilar atelectasis. Small partially loculated lateral and posterior right pleural effusion with associated atelectasis. Punctate posterior right basilar calcified granuloma. Previous heart  surgery.   LIVER: Mild hepatomegaly with the liver measuring 19.0 cm in length on the right, compared to 20.1 cm previously. Liver density was  within the limits of normal. No gross liver lesion in this unenhaced exam.   GALLBLADDER: No calcified stone, gallbladder wall thickening, or adjacent edema.   BILE DUCTS: No intrahepatic biliary ductal dilatation. Common bile duct was within the limits of normal.   SPLEEN: Mild splenomegaly with the spleen measuring 13.2 cm AP by 12.4 cm longitudinally, previously measuring 13.4 x 13.2 cm respectively. No gross splenic mass..   PANCREAS: Mild pancreatic atrophy. No pancreatic mass or inflammation, or ductal dilatation.   KIDNEYS/ADRENALS: No adrenal mass or enlargement. There is prominent bilateral native renal atrophy. There are multiple tiny calcifications in both kidneys which could be vascular and/or stones. No hydronephrosis. There is an exophytic posterior mid to upper pole right renal cyst measuring 12 mm, and a small anterior lower pole left renal cortical cyst. No suspicious mass in either kidney in this unenhanced exam. No ureteral stone or dilatation.   BLADDER/PELVIS: Urinary bladder was empty and collapsed.. No uterine enlargement. No adnexal lesion on the left. There is asymmetric hypodense fullness of the right ovary measuring 49 x 45 mm, compared to 49 x 44 mm when remeasured at the same level on the previous exam from 12/02/2024, grossly stable.   GREAT VESSELS/RETROPERITONEUM: Catheter in the IVC as described below. Mild mural calcifications in the abdominal aorta and iliac and femoral arteries. No abdominal aortic aneurysm. Multiple small stable periaortic retroperitoneal lymph nodes in the abdomen. No suspicious mesenteric adenopathy. No suspicious pelvic or inguinal adenopathy.   PERITONEUM: No ascites. No pneumoperitoneum. No peritoneal or mesenteric mass or inflammation.   BOWEL: The stomach was not well distended.. There was no small bowel dilatation  or small bowel wall thickening. No small-bowel obstruction. There is diffuse retained colonic stool throughout the colon and rectum, least pronounced in the sigmoid and most pronounced from the cecum through the transverse colon. Some of the stool is hyperdense, suggesting stasis. There was no colonic wall thickening or large bowel obstruction.  No edema adjacent to the colon. The cecal appendix was intact.   BONES: Bones are diffusely increased in density. No destructive lytic or blastic bone lesion.   ABDOMINAL WALL: Patient has a right femoral venous catheter extends cephalad all the way to the junction of the inferior vena cava and right atrium..       Prominent atrophy of the native kidneys. Please see above for details. Small bilateral renal cysts. No hydronephrosis. Urinary bladder was empty and collapsed.   Diffusely dense bones, most likely due to renal osteodystrophy.   Right femoral venous catheter that extends all the way to the junction of the right atrium with the inferior vena cava.   Splenomegaly, slightly improved.   Pancreatic atrophy.   Mild nonspecific periaortic retroperitoneal adenopathy.   Stable nonspecific asymmetric fullness of the right ovary. This could be due to stable postmenopausal cyst or stable neoplasm. Recommend further evaluation with pelvic ultrasound.   Diffuse retained colonic stool as described with findings of stool stasis. Currently without associated edema or obstruction.   Small bilateral pleural effusions with associated atelectasis, right greater than left. Mild fluid in the fissures.   MACRO: None   Signed by: Melvin Williamson 6/15/2025 3:05 PM Dictation workstation:   [id number]      This patient has a central line   Reason for the central line remaining today? Dialysis/Hemapheresis        Assessment & Plan  Complication associated with dialysis catheter  - per patient HD line sliding out and noted erythema and purulent drainage  -blood cultures are in progress  -  continue Zosyn and vancomycin  - wound culture ordered  - ID consultation placed  - IR consultation placed  - Hx of MSSA/MRSA bacteremia    - pain/itching management: Dilaudid 0.4mg IV Q 3 hours with Benadryl 50mg IV Q 3 hours, regimen has worked for her on previous admissions    ESRD  - HD-> M/W/F   - nephrology following   - Dialyzed today     Hyperkalemia  -potassium 5.9  -refused lokelma, states cannot tolerate. Give IV insulin and D50 per nephrology  -Improved     HTN  - resumed home medications  - vitals as ordered     Anemia  -Likely secondary to CKD vs ABNER  -Check iron panel, occult stool  -stable   -PPI    Seizure  - resumed home medication  - seizure precaution    CAD  - ASA and statin  - no chest pain currently    Anxiety/depression  - resumed home medications     GI prophylaxis  -PPI    DVT prophylaxis  - subcutaneous heparin     Plan  Admit to tele  Monitor fluid/electrolytes   Pain/itching regimen   Dialysis per nephrology   Continue ATB, follow cultures, ID on consult  DVT prophylaxis  Repeat potassium and H/H this afternoon  CBC and BMP in AM    Plan to discharge to home with palliative care when medically stable. Per nephrology note, referral to surgery for possible peritoneal dialysis access.     Vania Escalante, APRN-CNP

## 2025-06-16 NOTE — INDIVIDUALIZED OVERALL PLAN OF CARE NOTE
Goals of care established. Would recommend discharge to home with PO hydromorphone 2 mg PO q6 hours as needed when pt is medically stable. D/w attending service. We will sign off at this time.

## 2025-06-16 NOTE — ASSESSMENT & PLAN NOTE
- per patient HD line sliding out and noted erythema and purulent drainage  -blood cultures are in progress  - continue Zosyn and vancomycin  - wound culture ordered  - ID consultation placed  - IR consultation placed  - Hx of MSSA/MRSA bacteremia    - pain/itching management: Dilaudid 0.4mg IV Q 3 hours with Benadryl 50mg IV Q 3 hours, regimen has worked for her on previous admissions    ESRD  - HD-> M/W/F   - nephrology following   - Dialyzed today     Hyperkalemia  -potassium 5.9  -refused lokelma, states cannot tolerate. Give IV insulin and D50 per nephrology  -Improved     HTN  - resumed home medications  - vitals as ordered     Anemia  -Likely secondary to CKD vs ABNER  -Check iron panel, occult stool  -stable   -PPI    Seizure  - resumed home medication  - seizure precaution    CAD  - ASA and statin  - no chest pain currently    Anxiety/depression  - resumed home medications     GI prophylaxis  -PPI    DVT prophylaxis  - subcutaneous heparin     Plan  Admit to tele  Monitor fluid/electrolytes   Pain/itching regimen   Dialysis per nephrology   Continue ATB, follow cultures, ID on consult  DVT prophylaxis  Repeat potassium and H/H this afternoon  CBC and BMP in AM

## 2025-06-16 NOTE — PRE-PROCEDURE NOTE
Report from Sending RN:    Report From: FARAZ Trejo  Recent Surgery of Procedure: No  Baseline Level of Consciousness (LOC): A&Ox4  Oxygen Use: No  Type: n/a  Diabetic: No  Last BP Med Given Day of Dialysis: see EMAR  Last Pain Med Given: see EMAR  Lab Tests to be Obtained with Dialysis: No  Blood Transfusion to be Given During Dialysis: No  Available IV Access: Yes  Medications to be Administered During Dialysis: No  Continuous IV Infusion Running: No  Restraints on Currently or in the Last 24 Hours: No  Hand-Off Communication: stable and ready for dialysis in HD room  Dialysis Catheter Dressing: will assess when patient arrives  Last Dressing Change: will assess when patient arrives

## 2025-06-17 LAB
ANION GAP SERPL CALCULATED.3IONS-SCNC: 15 MMOL/L (ref 10–20)
BUN SERPL-MCNC: 28 MG/DL (ref 6–23)
CALCIUM SERPL-MCNC: 8.2 MG/DL (ref 8.6–10.3)
CHLORIDE SERPL-SCNC: 99 MMOL/L (ref 98–107)
CO2 SERPL-SCNC: 27 MMOL/L (ref 21–32)
CREAT SERPL-MCNC: 4.48 MG/DL (ref 0.5–1.05)
EGFRCR SERPLBLD CKD-EPI 2021: 11 ML/MIN/1.73M*2
ERYTHROCYTE [DISTWIDTH] IN BLOOD BY AUTOMATED COUNT: 15.5 % (ref 11.5–14.5)
GLUCOSE SERPL-MCNC: 90 MG/DL (ref 74–99)
HCT VFR BLD AUTO: 31 % (ref 36–46)
HGB BLD-MCNC: 9.5 G/DL (ref 12–16)
MCH RBC QN AUTO: 30.7 PG (ref 26–34)
MCHC RBC AUTO-ENTMCNC: 30.6 G/DL (ref 32–36)
MCV RBC AUTO: 100 FL (ref 80–100)
NRBC BLD-RTO: 0 /100 WBCS (ref 0–0)
PLATELET # BLD AUTO: 181 X10*3/UL (ref 150–450)
POTASSIUM SERPL-SCNC: 4.4 MMOL/L (ref 3.5–5.3)
RBC # BLD AUTO: 3.09 X10*6/UL (ref 4–5.2)
SODIUM SERPL-SCNC: 137 MMOL/L (ref 136–145)
WBC # BLD AUTO: 4 X10*3/UL (ref 4.4–11.3)

## 2025-06-17 PROCEDURE — 2500000001 HC RX 250 WO HCPCS SELF ADMINISTERED DRUGS (ALT 637 FOR MEDICARE OP): Performed by: INTERNAL MEDICINE

## 2025-06-17 PROCEDURE — 85027 COMPLETE CBC AUTOMATED: CPT | Performed by: NURSE PRACTITIONER

## 2025-06-17 PROCEDURE — 2500000004 HC RX 250 GENERAL PHARMACY W/ HCPCS (ALT 636 FOR OP/ED): Performed by: NURSE PRACTITIONER

## 2025-06-17 PROCEDURE — 2500000001 HC RX 250 WO HCPCS SELF ADMINISTERED DRUGS (ALT 637 FOR MEDICARE OP): Performed by: NURSE PRACTITIONER

## 2025-06-17 PROCEDURE — 1200000002 HC GENERAL ROOM WITH TELEMETRY DAILY

## 2025-06-17 PROCEDURE — 2500000005 HC RX 250 GENERAL PHARMACY W/O HCPCS: Performed by: NURSE PRACTITIONER

## 2025-06-17 PROCEDURE — 36415 COLL VENOUS BLD VENIPUNCTURE: CPT | Performed by: NURSE PRACTITIONER

## 2025-06-17 PROCEDURE — 80048 BASIC METABOLIC PNL TOTAL CA: CPT | Performed by: NURSE PRACTITIONER

## 2025-06-17 PROCEDURE — 2500000004 HC RX 250 GENERAL PHARMACY W/ HCPCS (ALT 636 FOR OP/ED): Mod: JW | Performed by: INTERNAL MEDICINE

## 2025-06-17 PROCEDURE — 2500000002 HC RX 250 W HCPCS SELF ADMINISTERED DRUGS (ALT 637 FOR MEDICARE OP, ALT 636 FOR OP/ED): Performed by: INTERNAL MEDICINE

## 2025-06-17 PROCEDURE — 99232 SBSQ HOSP IP/OBS MODERATE 35: CPT | Performed by: NURSE PRACTITIONER

## 2025-06-17 PROCEDURE — 2500000001 HC RX 250 WO HCPCS SELF ADMINISTERED DRUGS (ALT 637 FOR MEDICARE OP)

## 2025-06-17 RX ORDER — LIDOCAINE 560 MG/1
1 PATCH PERCUTANEOUS; TOPICAL; TRANSDERMAL DAILY
Status: DISCONTINUED | OUTPATIENT
Start: 2025-06-17 | End: 2025-06-19 | Stop reason: HOSPADM

## 2025-06-17 RX ADMIN — AMLODIPINE BESYLATE 5 MG: 5 TABLET ORAL at 08:12

## 2025-06-17 RX ADMIN — METHOCARBAMOL TABLETS 500 MG: 500 TABLET, COATED ORAL at 14:42

## 2025-06-17 RX ADMIN — DIPHENHYDRAMINE HYDROCHLORIDE 50 MG: 50 INJECTION, SOLUTION INTRAMUSCULAR; INTRAVENOUS at 20:40

## 2025-06-17 RX ADMIN — DIPHENHYDRAMINE HYDROCHLORIDE 50 MG: 50 INJECTION, SOLUTION INTRAMUSCULAR; INTRAVENOUS at 23:54

## 2025-06-17 RX ADMIN — HYDROMORPHONE HYDROCHLORIDE 0.4 MG: 1 INJECTION, SOLUTION INTRAMUSCULAR; INTRAVENOUS; SUBCUTANEOUS at 06:02

## 2025-06-17 RX ADMIN — DIPHENHYDRAMINE HYDROCHLORIDE 50 MG: 50 INJECTION, SOLUTION INTRAMUSCULAR; INTRAVENOUS at 12:23

## 2025-06-17 RX ADMIN — CARVEDILOL 12.5 MG: 12.5 TABLET, FILM COATED ORAL at 08:12

## 2025-06-17 RX ADMIN — CLONIDINE HYDROCHLORIDE 0.3 MG: 0.3 TABLET ORAL at 14:42

## 2025-06-17 RX ADMIN — CLONIDINE HYDROCHLORIDE 0.3 MG: 0.3 TABLET ORAL at 21:45

## 2025-06-17 RX ADMIN — HYDROMORPHONE HYDROCHLORIDE 0.4 MG: 1 INJECTION, SOLUTION INTRAMUSCULAR; INTRAVENOUS; SUBCUTANEOUS at 01:52

## 2025-06-17 RX ADMIN — DIPHENHYDRAMINE HYDROCHLORIDE 50 MG: 50 INJECTION, SOLUTION INTRAMUSCULAR; INTRAVENOUS at 09:17

## 2025-06-17 RX ADMIN — CLONIDINE HYDROCHLORIDE 0.3 MG: 0.3 TABLET ORAL at 06:01

## 2025-06-17 RX ADMIN — LIDOCAINE 4% 1 PATCH: 40 PATCH TOPICAL at 12:23

## 2025-06-17 RX ADMIN — HYDROMORPHONE HYDROCHLORIDE 0.4 MG: 1 INJECTION, SOLUTION INTRAMUSCULAR; INTRAVENOUS; SUBCUTANEOUS at 12:23

## 2025-06-17 RX ADMIN — HYDROMORPHONE HYDROCHLORIDE 0.4 MG: 1 INJECTION, SOLUTION INTRAMUSCULAR; INTRAVENOUS; SUBCUTANEOUS at 09:17

## 2025-06-17 RX ADMIN — DIPHENHYDRAMINE HYDROCHLORIDE 50 MG: 50 INJECTION, SOLUTION INTRAMUSCULAR; INTRAVENOUS at 06:02

## 2025-06-17 RX ADMIN — HYDROMORPHONE HYDROCHLORIDE 0.4 MG: 1 INJECTION, SOLUTION INTRAMUSCULAR; INTRAVENOUS; SUBCUTANEOUS at 23:54

## 2025-06-17 RX ADMIN — DIPHENHYDRAMINE HYDROCHLORIDE 50 MG: 50 INJECTION, SOLUTION INTRAMUSCULAR; INTRAVENOUS at 16:38

## 2025-06-17 RX ADMIN — METHOCARBAMOL TABLETS 500 MG: 500 TABLET, COATED ORAL at 21:45

## 2025-06-17 RX ADMIN — SULFAMETHOXAZOLE AND TRIMETHOPRIM 1 TABLET: 400; 80 TABLET ORAL at 08:13

## 2025-06-17 RX ADMIN — METHOCARBAMOL TABLETS 500 MG: 500 TABLET, COATED ORAL at 06:01

## 2025-06-17 RX ADMIN — HYDROMORPHONE HYDROCHLORIDE 0.4 MG: 1 INJECTION, SOLUTION INTRAMUSCULAR; INTRAVENOUS; SUBCUTANEOUS at 20:40

## 2025-06-17 RX ADMIN — HYDROMORPHONE HYDROCHLORIDE 0.4 MG: 1 INJECTION, SOLUTION INTRAMUSCULAR; INTRAVENOUS; SUBCUTANEOUS at 16:38

## 2025-06-17 RX ADMIN — OXYCODONE HYDROCHLORIDE 5 MG: 5 TABLET ORAL at 21:44

## 2025-06-17 RX ADMIN — CARVEDILOL 12.5 MG: 12.5 TABLET, FILM COATED ORAL at 20:37

## 2025-06-17 RX ADMIN — DIPHENHYDRAMINE HYDROCHLORIDE 50 MG: 50 INJECTION, SOLUTION INTRAMUSCULAR; INTRAVENOUS at 01:52

## 2025-06-17 RX ADMIN — SULFAMETHOXAZOLE AND TRIMETHOPRIM 1 TABLET: 400; 80 TABLET ORAL at 20:37

## 2025-06-17 ASSESSMENT — COGNITIVE AND FUNCTIONAL STATUS - GENERAL
MOBILITY SCORE: 24
MOBILITY SCORE: 24
DAILY ACTIVITIY SCORE: 24

## 2025-06-17 ASSESSMENT — PAIN SCALES - GENERAL
PAINLEVEL_OUTOF10: 10 - WORST POSSIBLE PAIN
PAINLEVEL_OUTOF10: 10 - WORST POSSIBLE PAIN
PAINLEVEL_OUTOF10: 9
PAINLEVEL_OUTOF10: 10 - WORST POSSIBLE PAIN
PAINLEVEL_OUTOF10: 6
PAINLEVEL_OUTOF10: 10 - WORST POSSIBLE PAIN
PAINLEVEL_OUTOF10: 7
PAINLEVEL_OUTOF10: 10 - WORST POSSIBLE PAIN
PAINLEVEL_OUTOF10: 10 - WORST POSSIBLE PAIN
PAINLEVEL_OUTOF10: 6

## 2025-06-17 ASSESSMENT — PAIN DESCRIPTION - ORIENTATION
ORIENTATION: RIGHT

## 2025-06-17 ASSESSMENT — PAIN DESCRIPTION - DESCRIPTORS
DESCRIPTORS: ACHING
DESCRIPTORS: SHARP
DESCRIPTORS: SHOOTING
DESCRIPTORS: ACHING
DESCRIPTORS: SHARP;SHOOTING

## 2025-06-17 ASSESSMENT — PAIN DESCRIPTION - LOCATION
LOCATION: GROIN

## 2025-06-17 NOTE — PROGRESS NOTES
"Batsheva Page is a 49 y.o. female on day 9 of admission presenting with Complication associated with dialysis catheter.    Subjective   Patient sitting on side of bed. States pain is controlled when she is lying down, but nearly unbearable with walking. Denies chest pain, shortness of breath, abdominal pain, fevers, chills.        Objective     Physical Exam  Constitutional:       Appearance: Normal appearance.   HENT:      Head: Normocephalic and atraumatic.      Mouth/Throat:      Mouth: Mucous membranes are moist.      Pharynx: Oropharynx is clear.   Eyes:      Extraocular Movements: Extraocular movements intact.      Conjunctiva/sclera: Conjunctivae normal.      Pupils: Pupils are equal, round, and reactive to light.   Cardiovascular:      Rate and Rhythm: Normal rate and regular rhythm.      Pulses: Normal pulses.      Heart sounds: Normal heart sounds.   Pulmonary:      Effort: Pulmonary effort is normal.      Breath sounds: Normal breath sounds.   Abdominal:      General: Bowel sounds are normal.      Palpations: Abdomen is soft.      Tenderness: There is no abdominal tenderness.   Musculoskeletal:         General: Normal range of motion.      Cervical back: Normal range of motion and neck supple.   Skin:     General: Skin is warm and dry.      Capillary Refill: Capillary refill takes less than 2 seconds.   Neurological:      General: No focal deficit present.      Mental Status: She is alert and oriented to person, place, and time.   Psychiatric:         Mood and Affect: Mood normal.         Behavior: Behavior normal.         Last Recorded Vitals  Blood pressure 172/79, pulse 65, temperature 36.4 °C (97.5 °F), temperature source Oral, resp. rate 18, height 1.651 m (5' 5\"), weight 69 kg (152 lb 1.9 oz), SpO2 96%.  Intake/Output last 3 Shifts:  I/O last 3 completed shifts:  In: 1100 (15.9 mL/kg) [P.O.:300; I.V.:400 (5.8 mL/kg); Other:400]  Out: 4244 (61.5 mL/kg) [Other:4244]  Weight: 69 kg     Relevant " Results  Results for orders placed or performed during the hospital encounter of 06/08/25 (from the past 24 hours)   POCT GLUCOSE   Result Value Ref Range    POCT Glucose 84 74 - 99 mg/dL   Basic Metabolic Panel   Result Value Ref Range    Glucose 90 74 - 99 mg/dL    Sodium 137 136 - 145 mmol/L    Potassium 4.4 3.5 - 5.3 mmol/L    Chloride 99 98 - 107 mmol/L    Bicarbonate 27 21 - 32 mmol/L    Anion Gap 15 10 - 20 mmol/L    Urea Nitrogen 28 (H) 6 - 23 mg/dL    Creatinine 4.48 (H) 0.50 - 1.05 mg/dL    eGFR 11 (L) >60 mL/min/1.73m*2    Calcium 8.2 (L) 8.6 - 10.3 mg/dL   CBC   Result Value Ref Range    WBC 4.0 (L) 4.4 - 11.3 x10*3/uL    nRBC 0.0 0.0 - 0.0 /100 WBCs    RBC 3.09 (L) 4.00 - 5.20 x10*6/uL    Hemoglobin 9.5 (L) 12.0 - 16.0 g/dL    Hematocrit 31.0 (L) 36.0 - 46.0 %     80 - 100 fL    MCH 30.7 26.0 - 34.0 pg    MCHC 30.6 (L) 32.0 - 36.0 g/dL    RDW 15.5 (H) 11.5 - 14.5 %    Platelets 181 150 - 450 x10*3/uL       Assessment & Plan  Complication associated with dialysis catheter  - per patient HD line sliding out and noted erythema and purulent drainage  - blood cultures are negative   - Continue Bactrim through 6/20  - wound culture - MRSA susceptible to Bactrim   - ID consultation placed - appreciate above recs, signed off.   - IR consultation placed - rewired   - Hx of MSSA/MRSA bacteremia    - Continue Vanco dwell to dialysis line   - pain/itching management: Dilaudid 0.4mg IV Q 3 hours with Benadryl 50mg IV Q 3 hours, regimen has worked for her on previous admissions   - Palliative medicine consulted and recommend Dilaudid 2 mg po q 6 hours PRN at discharge     ESRD  - HD-> M/W/F   - nephrology following     Hyperkalemia  -potassium 5.9 on admission   -refused lokelma, states cannot tolerate. Give IV insulin and D50 per nephrology  -Improved     HTN  - resumed home medications  - vitals as ordered     Anemia  -Likely secondary to CKD vs ABNER  -Check iron panel, occult stool  -stable    -PPI    Seizure  - resumed home medication  - seizure precaution    CAD  - ASA and statin  - no chest pain currently    Anxiety/depression  - resumed home medications     GI prophylaxis  -PPI    DVT prophylaxis  - subcutaneous heparin     Plan  Admit to tele  Monitor fluid/electrolytes   Pain/itching regimen   Dialysis per nephrology   DVT prophylaxis  CBC and BMP in AM  Consider discharge to home in the next 24-48 hours       Vania Escalante, APRN-CNP

## 2025-06-17 NOTE — ASSESSMENT & PLAN NOTE
- per patient HD line sliding out and noted erythema and purulent drainage  - blood cultures are negative   - Continue Bactrim through 6/20  - wound culture - MRSA susceptible to Bactrim   - ID consultation placed - appreciate above recs, signed off.   - IR consultation placed - rewired   - Hx of MSSA/MRSA bacteremia    - Continue Vanco dwell to dialysis line   - pain/itching management: Dilaudid 0.4mg IV Q 3 hours with Benadryl 50mg IV Q 3 hours, regimen has worked for her on previous admissions   - Palliative medicine consulted and recommend Dilaudid 2 mg po q 6 hours PRN at discharge     ESRD  - HD-> M/W/F   - nephrology following     Hyperkalemia  -potassium 5.9 on admission   -refused lokelma, states cannot tolerate. Give IV insulin and D50 per nephrology  -Improved     HTN  - resumed home medications  - vitals as ordered     Anemia  -Likely secondary to CKD vs ABNER  -Check iron panel, occult stool  -stable   -PPI    Seizure  - resumed home medication  - seizure precaution    CAD  - ASA and statin  - no chest pain currently    Anxiety/depression  - resumed home medications     GI prophylaxis  -PPI    DVT prophylaxis  - subcutaneous heparin     Plan  Admit to tele  Monitor fluid/electrolytes   Pain/itching regimen   Dialysis per nephrology   DVT prophylaxis  CBC and BMP in AM  Consider discharge to home in the next 24-48 hours

## 2025-06-17 NOTE — NURSING NOTE
Vascular access note    Patient with Rt femoral dialysis catheters, dressing change done using sterile technique, insertion site tender per patient, small amount of dried blood over insertion site, sutures intact. Patient tolerated well.

## 2025-06-17 NOTE — CARE PLAN
The patient's goals for the shift include Rest well & safety    The clinical goals for the shift include pain management, safety    Over the shift, the patient did not make progress toward the following goals. Barriers to progression include . Recommendations to address these barriers include .  Problem: Pain - Adult  Goal: Verbalizes/displays adequate comfort level or baseline comfort level  Outcome: Progressing     Problem: Safety - Adult  Goal: Free from fall injury  Outcome: Progressing

## 2025-06-17 NOTE — CONSULTS
"Nutrition Initial Assessment:   Nutrition Assessment    Reason for Assessment: Admission nursing screening    Patient is a 49 y.o. female presenting with complication associated with dialysis catheter. Seeing Pt due to LOS day 9.     Nutrition History:  Energy Intake: Good > 75 %, Fair 50-75 %  Food and Nutrient History: Seeing Pt due to LOS day 9. Pt continues with renal diet order, eating well % of all meals. Wt fluctuating +/- 10 lbs, pt noted to be on dialysis which explains wt fluctuations. Denies N/V at this time. No nutritional concerns. Continue to monitor PO intakes and wt trends.    Anthropometrics:  Height: 165.1 cm (5' 5\")   Weight: 69 kg (152 lb 1.9 oz)   BMI (Calculated): 25.31    Weight History:   Daily Weight  06/10/25 : 69 kg (152 lb 1.9 oz)  04/27/25 : 69.4 kg (153 lb)  12/16/24 : 67.6 kg (149 lb 0.5 oz)  12/06/24 : 71.9 kg (158 lb 9.6 oz)  12/02/24 : 68 kg (149 lb 14.6 oz)  09/19/24 : 70.7 kg (155 lb 14.4 oz)  08/20/24 : 65 kg (143 lb 4.8 oz)  05/07/24 : 66.2 kg (145 lb 15.1 oz)  04/05/24 : 69.8 kg (153 lb 14.1 oz)  02/08/24 : 62 kg (136 lb 11 oz)     Weight Change %:  Significant Weight Loss: No    Nutrition Focused Physical Exam Findings:  Subcutaneous Fat Loss:   Defer Subcutaneous Fat Loss Assessment: Defer all  Defer All Reason: unable to assess  Muscle Wasting:  Defer Muscle Wasting Assessment: Defer all  Defer All Reason: unable to assess  Edema:  Edema: +2 mild  Edema Location: BUE  Physical Findings:  Skin: Positive (wound to R anterior leg noted.)    Nutrition Significant Labs:  CBC Trend:   Results from last 7 days   Lab Units 06/17/25  0705 06/16/25  0435 06/15/25  0516 06/14/25  0521   WBC AUTO x10*3/uL 4.0* 4.6 4.4 4.7   RBC AUTO x10*6/uL 3.09* 3.09* 2.90* 2.95*   HEMOGLOBIN g/dL 9.5* 9.4* 9.0* 9.1*   HEMATOCRIT % 31.0* 29.6* 28.1* 28.5*   MCV fL 100 96 97 97   PLATELETS AUTO x10*3/uL 181 189 165 153    , BMP Trend:   Results from last 7 days   Lab Units 06/17/25  0705 " 06/16/25  0435 06/15/25  0516 06/14/25  0521   GLUCOSE mg/dL 90 86 81 83   CALCIUM mg/dL 8.2* 8.1* 7.8* 7.6*   SODIUM mmol/L 137 135* 135* 136   POTASSIUM mmol/L 4.4 5.1 4.5 4.6   CO2 mmol/L 27 25 26 26   CHLORIDE mmol/L 99 98 96* 97*   BUN mg/dL 28* 38* 26* 42*   CREATININE mg/dL 4.48* 6.25* 4.99* 7.05*    , A1C:  Lab Results   Component Value Date    HGBA1C 4.6 12/23/2022       Nutrition Specific Medications:  Scheduled Medications[1]     I/O:   Last BM Date: 06/11/25;      Dietary Orders (From admission, onward)       Start     Ordered    06/08/25 1623  May Participate in Room Service  ( ROOM SERVICE MAY PARTICIPATE)  Once        Question:  .  Answer:  Yes    06/08/25 1622    06/08/25 1510  Adult diet Renal; Potassium Restricted 2 gm (50mEq); 2 - 3 grams Sodium  Diet effective now        Question Answer Comment   Diet type Renal    Potassium restriction: Potassium Restricted 2 gm (50mEq)    Sodium restriction: 2 - 3 grams Sodium        06/08/25 1509                     Estimated Needs:   Total Energy Estimated Needs in 24 hours (kCal): 2070 kCal  Method for Estimating Needs: 30kcals/kg BW  Total Protein Estimated Needs in 24 Hours (g):  (82-90)  Method for Estimating 24 Hour Protein Needs: 1.2-1.3g/kg BW     Method for Estimating 24 Hour Fluid Needs: 1 ml/kcal or per MD        Nutrition Diagnosis   Malnutrition Diagnosis  Patient has Malnutrition Diagnosis: No    Nutrition Diagnosis  Patient has Nutrition Diagnosis: Yes  Diagnosis Status (1): New  Nutrition Diagnosis 1: Increased nutrient needs  Related to (1): increased demand for nutrient  As Evidenced by (1): hemodialysis       Nutrition Interventions/Recommendations   Nutrition prescription for oral nutrition    Nutrition Recommendations:  Individualized Nutrition Prescription Provided for : Continue with renal diet.    Nutrition Interventions/Goals:   Meals and Snacks: Mineral-modified diet  Goal: Consume 3 meals daily.      Education Documentation  No  documentation found.    N/a    Nutrition Monitoring and Evaluation   Estimated Energy Intake: Energy intake greater or equal to 75% of estimated energy needs  Intake / Amount of food: Consumes at least 75% or more of meals/snacks/supplements, Meets > 75% estimated energy needs    Body Weight: Body weight - Maintain stable weight    Electrolyte and Renal Panel: Electrolytes within normal limits    Goal Status: New goal(s) identified    Time Spent (min): 60 minutes       [1] acetaminophen, 650 mg, oral, q6h  amLODIPine, 5 mg, oral, Daily  aspirin, 81 mg, oral, Daily  atorvastatin, 40 mg, oral, Nightly  calcitriol, 0.5 mcg, oral, Daily  carvedilol, 12.5 mg, oral, BID  cloNIDine, 0.3 mg, oral, q8h KIMBERLY  epoetin brandy or biosimilar, 6,000 Units, subcutaneous, Once per day on Monday Wednesday Friday  ergocalciferol, 1.25 mg, oral, Every Sunday  heparin, 5,000 Units, subcutaneous, q12h  heparin flush, 5 mL, intra-catheter, Once  levETIRAcetam, 750 mg, oral, Nightly  lidocaine, 0.1 mL, subcutaneous, Once  methocarbamol, 500 mg, oral, q8h KIMBERLY  pantoprazole, 40 mg, oral, Daily before breakfast  pregabalin, 75 mg, oral, Daily  sevelamer carbonate, 800 mg, oral, TID  sodium chloride, 1 spray, Each Nostril, Before meals & nightly  sulfamethoxazole-trimethoprim, 1 tablet, oral, BID  vancomycin 5 mg/mL + heparin 2500 units/mL in NS lock, 2 mL, intra-catheter, After Dialysis  vancomycin 5 mg/mL + heparin 2500 units/mL in NS lock, 2 mL, intra-catheter, After Dialysis

## 2025-06-17 NOTE — PROGRESS NOTES
"Batsheva Page is a 49 y.o. female on day 9 of admission presenting with Complication associated with dialysis catheter.    Subjective   Seen for end-stage kidney disease and dialysis therapy was dialyzed through a femoral permacath continue to have significant pain at the site of the catheter exit       Objective     Physical Exam  Neck:      Vascular: No carotid bruit.   Cardiovascular:      Rate and Rhythm: Normal rate and regular rhythm.      Heart sounds: No murmur heard.     No friction rub. No gallop.   Pulmonary:      Breath sounds: No wheezing, rhonchi or rales.   Chest:      Chest wall: No tenderness.   Abdominal:      General: There is no distension.      Tenderness: There is no abdominal tenderness. There is no guarding or rebound.   Musculoskeletal:         General: No swelling or tenderness.      Cervical back: Neck supple.      Right lower leg: No edema.      Left lower leg: No edema.   Lymphadenopathy:      Cervical: No cervical adenopathy.         Last Recorded Vitals  Blood pressure 155/90, pulse 70, temperature 36.7 °C (98.1 °F), temperature source Oral, resp. rate 18, height 1.651 m (5' 5\"), weight 69 kg (152 lb 1.9 oz), SpO2 96%.    Intake/Output last 3 Shifts:  I/O last 3 completed shifts:  In: 1100 (15.9 mL/kg) [P.O.:300; I.V.:400 (5.8 mL/kg); Other:400]  Out: 4244 (61.5 mL/kg) [Other:4244]  Weight: 69 kg   Current Medications[1]   Relevant Results    Results for orders placed or performed during the hospital encounter of 06/08/25 (from the past 96 hours)   POCT GLUCOSE   Result Value Ref Range    POCT Glucose 106 (H) 74 - 99 mg/dL   POCT GLUCOSE   Result Value Ref Range    POCT Glucose 98 74 - 99 mg/dL   POCT GLUCOSE   Result Value Ref Range    POCT Glucose 131 (H) 74 - 99 mg/dL   CBC   Result Value Ref Range    WBC 4.7 4.4 - 11.3 x10*3/uL    nRBC 0.0 0.0 - 0.0 /100 WBCs    RBC 2.95 (L) 4.00 - 5.20 x10*6/uL    Hemoglobin 9.1 (L) 12.0 - 16.0 g/dL    Hematocrit 28.5 (L) 36.0 - 46.0 %    " MCV 97 80 - 100 fL    MCH 30.8 26.0 - 34.0 pg    MCHC 31.9 (L) 32.0 - 36.0 g/dL    RDW 15.8 (H) 11.5 - 14.5 %    Platelets 153 150 - 450 x10*3/uL   Renal Function Panel   Result Value Ref Range    Glucose 83 74 - 99 mg/dL    Sodium 136 136 - 145 mmol/L    Potassium 4.6 3.5 - 5.3 mmol/L    Chloride 97 (L) 98 - 107 mmol/L    Bicarbonate 26 21 - 32 mmol/L    Anion Gap 18 10 - 20 mmol/L    Urea Nitrogen 42 (H) 6 - 23 mg/dL    Creatinine 7.05 (H) 0.50 - 1.05 mg/dL    eGFR 7 (L) >60 mL/min/1.73m*2    Calcium 7.6 (L) 8.6 - 10.3 mg/dL    Phosphorus 5.2 (H) 2.5 - 4.9 mg/dL    Albumin 3.6 3.4 - 5.0 g/dL   POCT GLUCOSE   Result Value Ref Range    POCT Glucose 102 (H) 74 - 99 mg/dL   POCT GLUCOSE   Result Value Ref Range    POCT Glucose 97 74 - 99 mg/dL   POCT GLUCOSE   Result Value Ref Range    POCT Glucose 93 74 - 99 mg/dL   POCT GLUCOSE   Result Value Ref Range    POCT Glucose 137 (H) 74 - 99 mg/dL   Basic Metabolic Panel   Result Value Ref Range    Glucose 81 74 - 99 mg/dL    Sodium 135 (L) 136 - 145 mmol/L    Potassium 4.5 3.5 - 5.3 mmol/L    Chloride 96 (L) 98 - 107 mmol/L    Bicarbonate 26 21 - 32 mmol/L    Anion Gap 18 10 - 20 mmol/L    Urea Nitrogen 26 (H) 6 - 23 mg/dL    Creatinine 4.99 (H) 0.50 - 1.05 mg/dL    eGFR 10 (L) >60 mL/min/1.73m*2    Calcium 7.8 (L) 8.6 - 10.3 mg/dL   CBC   Result Value Ref Range    WBC 4.4 4.4 - 11.3 x10*3/uL    nRBC 0.0 0.0 - 0.0 /100 WBCs    RBC 2.90 (L) 4.00 - 5.20 x10*6/uL    Hemoglobin 9.0 (L) 12.0 - 16.0 g/dL    Hematocrit 28.1 (L) 36.0 - 46.0 %    MCV 97 80 - 100 fL    MCH 31.0 26.0 - 34.0 pg    MCHC 32.0 32.0 - 36.0 g/dL    RDW 15.8 (H) 11.5 - 14.5 %    Platelets 165 150 - 450 x10*3/uL   POCT GLUCOSE   Result Value Ref Range    POCT Glucose 102 (H) 74 - 99 mg/dL   POCT GLUCOSE   Result Value Ref Range    POCT Glucose 116 (H) 74 - 99 mg/dL   POCT GLUCOSE   Result Value Ref Range    POCT Glucose 107 (H) 74 - 99 mg/dL   POCT GLUCOSE   Result Value Ref Range    POCT Glucose 150 (H) 74  - 99 mg/dL   Basic Metabolic Panel   Result Value Ref Range    Glucose 86 74 - 99 mg/dL    Sodium 135 (L) 136 - 145 mmol/L    Potassium 5.1 3.5 - 5.3 mmol/L    Chloride 98 98 - 107 mmol/L    Bicarbonate 25 21 - 32 mmol/L    Anion Gap 17 10 - 20 mmol/L    Urea Nitrogen 38 (H) 6 - 23 mg/dL    Creatinine 6.25 (H) 0.50 - 1.05 mg/dL    eGFR 8 (L) >60 mL/min/1.73m*2    Calcium 8.1 (L) 8.6 - 10.3 mg/dL   CBC   Result Value Ref Range    WBC 4.6 4.4 - 11.3 x10*3/uL    nRBC 0.0 0.0 - 0.0 /100 WBCs    RBC 3.09 (L) 4.00 - 5.20 x10*6/uL    Hemoglobin 9.4 (L) 12.0 - 16.0 g/dL    Hematocrit 29.6 (L) 36.0 - 46.0 %    MCV 96 80 - 100 fL    MCH 30.4 26.0 - 34.0 pg    MCHC 31.8 (L) 32.0 - 36.0 g/dL    RDW 15.5 (H) 11.5 - 14.5 %    Platelets 189 150 - 450 x10*3/uL   POCT GLUCOSE   Result Value Ref Range    POCT Glucose 84 74 - 99 mg/dL   Basic Metabolic Panel   Result Value Ref Range    Glucose 90 74 - 99 mg/dL    Sodium 137 136 - 145 mmol/L    Potassium 4.4 3.5 - 5.3 mmol/L    Chloride 99 98 - 107 mmol/L    Bicarbonate 27 21 - 32 mmol/L    Anion Gap 15 10 - 20 mmol/L    Urea Nitrogen 28 (H) 6 - 23 mg/dL    Creatinine 4.48 (H) 0.50 - 1.05 mg/dL    eGFR 11 (L) >60 mL/min/1.73m*2    Calcium 8.2 (L) 8.6 - 10.3 mg/dL   CBC   Result Value Ref Range    WBC 4.0 (L) 4.4 - 11.3 x10*3/uL    nRBC 0.0 0.0 - 0.0 /100 WBCs    RBC 3.09 (L) 4.00 - 5.20 x10*6/uL    Hemoglobin 9.5 (L) 12.0 - 16.0 g/dL    Hematocrit 31.0 (L) 36.0 - 46.0 %     80 - 100 fL    MCH 30.7 26.0 - 34.0 pg    MCHC 30.6 (L) 32.0 - 36.0 g/dL    RDW 15.5 (H) 11.5 - 14.5 %    Platelets 181 150 - 450 x10*3/uL       Assessment/Plan   End-stage renal disease continue dialysis on Monday Wednesday Friday patient was given the name and phone number for the surgeon to make an appointment for evaluation of PD catheter insertion  Hyperkalemia, resolved  Hypertension  Anemia continue with Epogen therapy  Dialysis catheter malfunction status post replacement    Zhou Pedersen MD          [1]   Current Facility-Administered Medications:     acetaminophen (Tylenol) tablet 650 mg, 650 mg, oral, q6h, Vannesa He, ORI-CNP, 650 mg at 06/16/25 1739    amLODIPine (Norvasc) tablet 5 mg, 5 mg, oral, Daily, Vu Pedersen MD, 5 mg at 06/17/25 0812    aspirin EC tablet 81 mg, 81 mg, oral, Daily, Hetal Villalba, ORI-CNP, 81 mg at 06/16/25 0804    atorvastatin (Lipitor) tablet 40 mg, 40 mg, oral, Nightly, Robolya Orly, APRN-CNP, 40 mg at 06/11/25 2159    calcitriol (Rocaltrol) capsule 0.5 mcg, 0.5 mcg, oral, Daily, Hetal Villalba, APRKAYE-CNP, 0.5 mcg at 06/16/25 0805    carvedilol (Coreg) tablet 12.5 mg, 12.5 mg, oral, BID, Hetal Villalba, ORI-CNP, 12.5 mg at 06/17/25 0812    cloNIDine (Catapres) tablet 0.3 mg, 0.3 mg, oral, q8h KIMBERLY, Vu Pedersen MD, 0.3 mg at 06/17/25 0601    dextrose 50 % injection 12.5 g, 12.5 g, intravenous, q15 min PRN, ORI Kincaid-CNP    dextrose 50 % injection 25 g, 25 g, intravenous, q15 min PRN, ORI Kincaid-CNP    diphenhydrAMINE (BENADryl) injection 50 mg, 50 mg, intravenous, q3h PRN, ORI Kincaid-CNP, 50 mg at 06/17/25 0917    epoetin brandy-epbx (Retacrit) injection 6,000 Units, 6,000 Units, subcutaneous, Once per day on Monday Wednesday Friday, Vu Pedersen MD, 6,000 Units at 06/16/25 1739    ergocalciferol (Vitamin D-2) capsule 1.25 mg, 1.25 mg, oral, Every Sunday, ORI Platt-CNP    glucagon (Glucagen) injection 1 mg, 1 mg, intramuscular, q15 min PRN, ANGELA Kincaid    glucagon (Glucagen) injection 1 mg, 1 mg, intramuscular, q15 min PRN, ANGELA Kincaid    heparin (porcine) injection 5,000 Units, 5,000 Units, subcutaneous, q12h, OIR Platt-CNP, 5,000 Units at 06/11/25 0544    heparin flush 100 unit/mL syringe 500 Units, 5 mL, intra-catheter, Once, ANGELA Mike    HYDROmorphone (Dilaudid) injection 0.2 mg, 0.2 mg, intravenous, q3h PRN, Everett Lopez DO    HYDROmorphone (Dilaudid) injection 0.4  mg, 0.4 mg, intravenous, q3h PRN, Everett Lopez, , 0.4 mg at 06/17/25 0917    levETIRAcetam (Keppra) tablet 750 mg, 750 mg, oral, Nightly, Carolina Orly, APRN-CNP, 750 mg at 06/11/25 2159    lidocaine (Xylocaine) 5 % ointment 1 Application, 1 Application, Topical, q6h PRN, ANGELA Goff    lidocaine 4 % patch 1 patch, 1 patch, transdermal, Daily, ANGELA Huynh    LORazepam (Ativan) tablet 0.5 mg, 0.5 mg, oral, q6h PRN, ANGELA Kincaid, 0.5 mg at 06/14/25 1503    methocarbamol (Robaxin) tablet 500 mg, 500 mg, oral, q8h KIMBERLY, ORI Goff-CNP, 500 mg at 06/17/25 0601    naloxone (Narcan) injection 0.2 mg, 0.2 mg, subcutaneous, q5 min PRN, ORI Goff-CNP    oxyCODONE (Roxicodone) immediate release tablet 5 mg, 5 mg, oral, q4h PRN, ORI Goff-CNP, 5 mg at 06/14/25 0301    pantoprazole (ProtoNix) EC tablet 40 mg, 40 mg, oral, Daily before breakfast, Carolina CEDRIC VillalbaN-CNP    pregabalin (Lyrica) capsule 75 mg, 75 mg, oral, Daily, Orsolya Orly, APRN-CNP, 75 mg at 06/15/25 0814    sevelamer carbonate (Renvela) tablet 800 mg, 800 mg, oral, TID, Vu Pedersen MD, 800 mg at 06/16/25 0805    sodium chloride (Ocean) 0.65 % nasal spray 1 spray, 1 spray, Each Nostril, Before meals & nightly, ANGELA Kincaid, 1 spray at 06/15/25 2124    sulfamethoxazole-trimethoprim (Bactrim) 400-80 mg per tablet 1 tablet, 1 tablet, oral, BID, Adama Soto MD, 1 tablet at 06/17/25 0813    vancomycin 5 mg/mL + heparin 2500 units/mL in NS lock, 2 mL, intra-catheter, After Dialysis, Vu Pedersen MD, 2 mL at 06/16/25 1309    vancomycin 5 mg/mL + heparin 2500 units/mL in NS lock, 2 mL, intra-catheter, After Dialysis, Vu Pedersen MD, 2 mL at 06/16/25 1309

## 2025-06-17 NOTE — PROGRESS NOTES
Anticipate discharge soon. At the time of discharge, patient will return home. [facility] was consulted for palliative care once she is home. Will follow.      06/17/25 1136   Discharge Planning   Home or Post Acute Services None   Expected Discharge Disposition Home   Does the patient need discharge transport arranged? No

## 2025-06-17 NOTE — CARE PLAN
Problem: Pain - Adult  Goal: Verbalizes/displays adequate comfort level or baseline comfort level  Outcome: Progressing   The patient's goals for the shift include Rest well & safety    The clinical goals for the shift include patient pain remain contolled through out shift

## 2025-06-18 ENCOUNTER — APPOINTMENT (OUTPATIENT)
Dept: DIALYSIS | Facility: HOSPITAL | Age: 50
End: 2025-06-18
Payer: MEDICARE

## 2025-06-18 LAB
ANION GAP SERPL CALCULATED.3IONS-SCNC: 17 MMOL/L (ref 10–20)
BUN SERPL-MCNC: 36 MG/DL (ref 6–23)
CALCIUM SERPL-MCNC: 7.9 MG/DL (ref 8.6–10.3)
CHLORIDE SERPL-SCNC: 97 MMOL/L (ref 98–107)
CO2 SERPL-SCNC: 25 MMOL/L (ref 21–32)
CREAT SERPL-MCNC: 5.67 MG/DL (ref 0.5–1.05)
EGFRCR SERPLBLD CKD-EPI 2021: 9 ML/MIN/1.73M*2
ERYTHROCYTE [DISTWIDTH] IN BLOOD BY AUTOMATED COUNT: 15.4 % (ref 11.5–14.5)
GLUCOSE BLD MANUAL STRIP-MCNC: 83 MG/DL (ref 74–99)
GLUCOSE BLD MANUAL STRIP-MCNC: 88 MG/DL (ref 74–99)
GLUCOSE SERPL-MCNC: 84 MG/DL (ref 74–99)
HCT VFR BLD AUTO: 30 % (ref 36–46)
HGB BLD-MCNC: 9.4 G/DL (ref 12–16)
MCH RBC QN AUTO: 30.5 PG (ref 26–34)
MCHC RBC AUTO-ENTMCNC: 31.3 G/DL (ref 32–36)
MCV RBC AUTO: 97 FL (ref 80–100)
NRBC BLD-RTO: 0 /100 WBCS (ref 0–0)
PLATELET # BLD AUTO: 195 X10*3/UL (ref 150–450)
POTASSIUM SERPL-SCNC: 4.7 MMOL/L (ref 3.5–5.3)
RBC # BLD AUTO: 3.08 X10*6/UL (ref 4–5.2)
SODIUM SERPL-SCNC: 134 MMOL/L (ref 136–145)
WBC # BLD AUTO: 4.4 X10*3/UL (ref 4.4–11.3)

## 2025-06-18 PROCEDURE — 2500000001 HC RX 250 WO HCPCS SELF ADMINISTERED DRUGS (ALT 637 FOR MEDICARE OP): Performed by: NURSE PRACTITIONER

## 2025-06-18 PROCEDURE — 80048 BASIC METABOLIC PNL TOTAL CA: CPT | Performed by: NURSE PRACTITIONER

## 2025-06-18 PROCEDURE — 82947 ASSAY GLUCOSE BLOOD QUANT: CPT

## 2025-06-18 PROCEDURE — 85027 COMPLETE CBC AUTOMATED: CPT | Performed by: NURSE PRACTITIONER

## 2025-06-18 PROCEDURE — 1200000002 HC GENERAL ROOM WITH TELEMETRY DAILY

## 2025-06-18 PROCEDURE — 2500000004 HC RX 250 GENERAL PHARMACY W/ HCPCS (ALT 636 FOR OP/ED): Performed by: INTERNAL MEDICINE

## 2025-06-18 PROCEDURE — 2500000001 HC RX 250 WO HCPCS SELF ADMINISTERED DRUGS (ALT 637 FOR MEDICARE OP)

## 2025-06-18 PROCEDURE — 36415 COLL VENOUS BLD VENIPUNCTURE: CPT | Performed by: NURSE PRACTITIONER

## 2025-06-18 PROCEDURE — 6350000001 HC RX 635 EPOETIN >10,000 UNITS: Performed by: INTERNAL MEDICINE

## 2025-06-18 PROCEDURE — 2500000001 HC RX 250 WO HCPCS SELF ADMINISTERED DRUGS (ALT 637 FOR MEDICARE OP): Performed by: INTERNAL MEDICINE

## 2025-06-18 PROCEDURE — 2500000004 HC RX 250 GENERAL PHARMACY W/ HCPCS (ALT 636 FOR OP/ED): Performed by: NURSE PRACTITIONER

## 2025-06-18 PROCEDURE — 99232 SBSQ HOSP IP/OBS MODERATE 35: CPT | Performed by: NURSE PRACTITIONER

## 2025-06-18 PROCEDURE — 2500000005 HC RX 250 GENERAL PHARMACY W/O HCPCS: Performed by: NURSE PRACTITIONER

## 2025-06-18 PROCEDURE — 90947 DIALYSIS REPEATED EVAL: CPT

## 2025-06-18 PROCEDURE — 2500000002 HC RX 250 W HCPCS SELF ADMINISTERED DRUGS (ALT 637 FOR MEDICARE OP, ALT 636 FOR OP/ED): Performed by: INTERNAL MEDICINE

## 2025-06-18 RX ORDER — HEPARIN SODIUM 1000 [USP'U]/ML
1000 INJECTION, SOLUTION INTRAVENOUS; SUBCUTANEOUS
Status: CANCELLED | OUTPATIENT
Start: 2025-06-18

## 2025-06-18 RX ADMIN — CLONIDINE HYDROCHLORIDE 0.3 MG: 0.3 TABLET ORAL at 22:02

## 2025-06-18 RX ADMIN — HYDROMORPHONE HYDROCHLORIDE 0.4 MG: 1 INJECTION, SOLUTION INTRAMUSCULAR; INTRAVENOUS; SUBCUTANEOUS at 18:27

## 2025-06-18 RX ADMIN — DIPHENHYDRAMINE HYDROCHLORIDE 50 MG: 50 INJECTION, SOLUTION INTRAMUSCULAR; INTRAVENOUS at 02:30

## 2025-06-18 RX ADMIN — HYDROMORPHONE HYDROCHLORIDE 0.4 MG: 1 INJECTION, SOLUTION INTRAMUSCULAR; INTRAVENOUS; SUBCUTANEOUS at 14:24

## 2025-06-18 RX ADMIN — CARVEDILOL 12.5 MG: 12.5 TABLET, FILM COATED ORAL at 08:06

## 2025-06-18 RX ADMIN — CLONIDINE HYDROCHLORIDE 0.3 MG: 0.3 TABLET ORAL at 13:56

## 2025-06-18 RX ADMIN — Medication 2 ML: at 16:24

## 2025-06-18 RX ADMIN — METHOCARBAMOL TABLETS 500 MG: 500 TABLET, COATED ORAL at 13:56

## 2025-06-18 RX ADMIN — SULFAMETHOXAZOLE AND TRIMETHOPRIM 1 TABLET: 400; 80 TABLET ORAL at 21:54

## 2025-06-18 RX ADMIN — DIPHENHYDRAMINE HYDROCHLORIDE 50 MG: 50 INJECTION, SOLUTION INTRAMUSCULAR; INTRAVENOUS at 10:52

## 2025-06-18 RX ADMIN — LIDOCAINE 4% 1 PATCH: 40 PATCH TOPICAL at 08:07

## 2025-06-18 RX ADMIN — DIPHENHYDRAMINE HYDROCHLORIDE 50 MG: 50 INJECTION, SOLUTION INTRAMUSCULAR; INTRAVENOUS at 14:24

## 2025-06-18 RX ADMIN — HYDROMORPHONE HYDROCHLORIDE 0.4 MG: 1 INJECTION, SOLUTION INTRAMUSCULAR; INTRAVENOUS; SUBCUTANEOUS at 21:53

## 2025-06-18 RX ADMIN — CARVEDILOL 12.5 MG: 12.5 TABLET, FILM COATED ORAL at 21:53

## 2025-06-18 RX ADMIN — HYDROMORPHONE HYDROCHLORIDE 0.4 MG: 1 INJECTION, SOLUTION INTRAMUSCULAR; INTRAVENOUS; SUBCUTANEOUS at 02:30

## 2025-06-18 RX ADMIN — EPOETIN ALFA-EPBX 6000 UNITS: 20000 INJECTION, SOLUTION INTRAVENOUS; SUBCUTANEOUS at 17:20

## 2025-06-18 RX ADMIN — AMLODIPINE BESYLATE 5 MG: 5 TABLET ORAL at 08:06

## 2025-06-18 RX ADMIN — DIPHENHYDRAMINE HYDROCHLORIDE 50 MG: 50 INJECTION, SOLUTION INTRAMUSCULAR; INTRAVENOUS at 05:50

## 2025-06-18 RX ADMIN — METHOCARBAMOL TABLETS 500 MG: 500 TABLET, COATED ORAL at 21:53

## 2025-06-18 RX ADMIN — DIPHENHYDRAMINE HYDROCHLORIDE 50 MG: 50 INJECTION, SOLUTION INTRAMUSCULAR; INTRAVENOUS at 21:53

## 2025-06-18 RX ADMIN — SULFAMETHOXAZOLE AND TRIMETHOPRIM 1 TABLET: 400; 80 TABLET ORAL at 08:06

## 2025-06-18 RX ADMIN — Medication 2 ML: at 16:23

## 2025-06-18 RX ADMIN — HYDROMORPHONE HYDROCHLORIDE 0.4 MG: 1 INJECTION, SOLUTION INTRAMUSCULAR; INTRAVENOUS; SUBCUTANEOUS at 10:18

## 2025-06-18 RX ADMIN — CLONIDINE HYDROCHLORIDE 0.3 MG: 0.3 TABLET ORAL at 05:50

## 2025-06-18 RX ADMIN — METHOCARBAMOL TABLETS 500 MG: 500 TABLET, COATED ORAL at 05:50

## 2025-06-18 RX ADMIN — DIPHENHYDRAMINE HYDROCHLORIDE 50 MG: 50 INJECTION, SOLUTION INTRAMUSCULAR; INTRAVENOUS at 18:27

## 2025-06-18 RX ADMIN — HYDROMORPHONE HYDROCHLORIDE 0.4 MG: 1 INJECTION, SOLUTION INTRAMUSCULAR; INTRAVENOUS; SUBCUTANEOUS at 05:50

## 2025-06-18 ASSESSMENT — PAIN DESCRIPTION - DESCRIPTORS
DESCRIPTORS: SHARP
DESCRIPTORS: ACHING

## 2025-06-18 ASSESSMENT — PAIN SCALES - GENERAL
PAINLEVEL_OUTOF10: 5 - MODERATE PAIN
PAINLEVEL_OUTOF10: 10 - WORST POSSIBLE PAIN
PAINLEVEL_OUTOF10: 10 - WORST POSSIBLE PAIN
PAINLEVEL_OUTOF10: 9
PAINLEVEL_OUTOF10: 10 - WORST POSSIBLE PAIN

## 2025-06-18 ASSESSMENT — PAIN DESCRIPTION - ORIENTATION
ORIENTATION: RIGHT

## 2025-06-18 ASSESSMENT — PAIN - FUNCTIONAL ASSESSMENT
PAIN_FUNCTIONAL_ASSESSMENT: 0-10

## 2025-06-18 ASSESSMENT — PAIN DESCRIPTION - LOCATION
LOCATION: GROIN

## 2025-06-18 ASSESSMENT — PAIN SCALES - WONG BAKER: WONGBAKER_NUMERICALRESPONSE: HURTS WHOLE LOT

## 2025-06-18 NOTE — CARE PLAN
The patient's goals for the shift include Rest well & safety    The clinical goals for the shift include manage pain    Over the shift, the patient did not make progress toward the following goals. Barriers to progression include . Recommendations to address these barriers include .  Problem: Pain - Adult  Goal: Verbalizes/displays adequate comfort level or baseline comfort level  6/17/2025 2214 by Freida Jara RN  Outcome: Progressing  6/17/2025 2214 by Freida Jara RN  Outcome: Progressing     Problem: Safety - Adult  Goal: Free from fall injury  6/17/2025 2214 by Freida Jara RN  Outcome: Progressing  6/17/2025 2214 by Freida Jara RN  Outcome: Progressing

## 2025-06-18 NOTE — POST-PROCEDURE NOTE
Report to Receiving RN:    Report To: FARAZ Sim  Time Report Called: 1325  Hand-Off Communication: Patient requested off dialysis 15min early, Dr LIBIA Pedersen notified/approved. 1847cc removed, BP reading 163/104 HR 75. Catheter ports were locked with saline only, capped securely. I am sending her with another set of caps so you can put the vanco dwell in and cap it off again. Patient is transport to return to room.   Complications During Treatment: No  Ultrafiltration Treatment: Yes, 1847cc  Medications Administered During Dialysis: Yes, pain meds/benadryl  Blood Products Administered During Dialysis: No  Labs Sent During Dialysis: No  Heparin Drip Rate Changes: N/A  Dialysis Catheter Dressing: clean, dry and intact  Last Dressing Change: 6/17/25    Electronic Signatures:  Heidy HANNA

## 2025-06-18 NOTE — CARE PLAN
Problem: Pain - Adult  Goal: Verbalizes/displays adequate comfort level or baseline comfort level  6/18/2025 0937 by Ivory Sim, RN  Outcome: Progressing  6/18/2025 0917 by Ivory Sim, RN  Outcome: Progressing   The patient's goals for the shift include Rest well & safety    The clinical goals for the shift include patient pain remain controlled

## 2025-06-18 NOTE — PRE-PROCEDURE NOTE
Report from Sending RN:    Report From: Ivory Sim RN   Recent Surgery of Procedure: No  Baseline Level of Consciousness (LOC): a/o x4  Oxygen Use: No  Type: n/a  Diabetic: No  Last BP Med Given Day of Dialysis: see MAR   Last Pain Med Given: none  Lab Tests to be Obtained with Dialysis: No  Blood Transfusion to be Given During Dialysis: No  Available IV Access: Yes  Medications to be Administered During Dialysis: No  Continuous IV Infusion Running: No  Restraints on Currently or in the Last 24 Hours: No  Hand-Off Communication: a/o x4, independent, VSS for dialysis   Dialysis Catheter Dressing: TBD   Last Dressing Change: TBD

## 2025-06-18 NOTE — CARE PLAN
The patient's goals for the shift include Rest well & safety    The clinical goals for the shift include manage pain    Over the shift, the patient did not make progress toward the following goals. Barriers to progression include . Recommendations to address these barriers include .  Problem: Pain - Adult  Goal: Verbalizes/displays adequate comfort level or baseline comfort level  Outcome: Progressing     Problem: Safety - Adult  Goal: Free from fall injury  Outcome: Progressing

## 2025-06-18 NOTE — CARE PLAN
Problem: Pain - Adult  Goal: Verbalizes/displays adequate comfort level or baseline comfort level  6/18/2025 0938 by Ivory Sim RN  Outcome: Progressing  6/18/2025 0937 by Ivory Sim RN  Outcome: Progressing  6/18/2025 0917 by Ivory Sim, FARAZ  Outcome: Progressing   The patient's goals for the shift include Rest well & safety    The clinical goals for the shift include patient pain remain controlled

## 2025-06-18 NOTE — PROGRESS NOTES
"Batsheva Page is a 49 y.o. female on day 10 of admission presenting with Complication associated with dialysis catheter.    Subjective   Patient sitting on side of bed. Pain at cath site worse today after dialysis. Denies chest pain, shortness of breath, abdominal pain.        Objective     Physical Exam  Constitutional:       Appearance: She is ill-appearing.   HENT:      Head: Normocephalic and atraumatic.      Mouth/Throat:      Mouth: Mucous membranes are moist.      Pharynx: Oropharynx is clear.   Eyes:      Extraocular Movements: Extraocular movements intact.      Conjunctiva/sclera: Conjunctivae normal.      Pupils: Pupils are equal, round, and reactive to light.   Cardiovascular:      Rate and Rhythm: Normal rate and regular rhythm.      Pulses: Normal pulses.      Heart sounds: Normal heart sounds.   Pulmonary:      Effort: Pulmonary effort is normal.      Breath sounds: Normal breath sounds.   Abdominal:      General: Bowel sounds are normal.      Palpations: Abdomen is soft.      Tenderness: There is no abdominal tenderness.   Musculoskeletal:         General: Normal range of motion.      Cervical back: Normal range of motion and neck supple.   Skin:     General: Skin is warm and dry.      Capillary Refill: Capillary refill takes less than 2 seconds.   Neurological:      General: No focal deficit present.      Mental Status: She is alert and oriented to person, place, and time.   Psychiatric:         Mood and Affect: Mood normal.         Behavior: Behavior normal.         Last Recorded Vitals  Blood pressure 171/90, pulse 77, temperature 36.3 °C (97.3 °F), temperature source Oral, resp. rate 18, height 1.651 m (5' 5\"), weight 69 kg (152 lb 1.9 oz), SpO2 93%.  Intake/Output last 3 Shifts:  I/O last 3 completed shifts:  In: 120 (1.7 mL/kg) [P.O.:120]  Out: - (0 mL/kg)   Weight: 69 kg     Relevant Results  Results for orders placed or performed during the hospital encounter of 06/08/25 (from the past " 24 hours)   CBC   Result Value Ref Range    WBC 4.4 4.4 - 11.3 x10*3/uL    nRBC 0.0 0.0 - 0.0 /100 WBCs    RBC 3.08 (L) 4.00 - 5.20 x10*6/uL    Hemoglobin 9.4 (L) 12.0 - 16.0 g/dL    Hematocrit 30.0 (L) 36.0 - 46.0 %    MCV 97 80 - 100 fL    MCH 30.5 26.0 - 34.0 pg    MCHC 31.3 (L) 32.0 - 36.0 g/dL    RDW 15.4 (H) 11.5 - 14.5 %    Platelets 195 150 - 450 x10*3/uL   Basic Metabolic Panel   Result Value Ref Range    Glucose 84 74 - 99 mg/dL    Sodium 134 (L) 136 - 145 mmol/L    Potassium 4.7 3.5 - 5.3 mmol/L    Chloride 97 (L) 98 - 107 mmol/L    Bicarbonate 25 21 - 32 mmol/L    Anion Gap 17 10 - 20 mmol/L    Urea Nitrogen 36 (H) 6 - 23 mg/dL    Creatinine 5.67 (H) 0.50 - 1.05 mg/dL    eGFR 9 (L) >60 mL/min/1.73m*2    Calcium 7.9 (L) 8.6 - 10.3 mg/dL   POCT GLUCOSE   Result Value Ref Range    POCT Glucose 83 74 - 99 mg/dL   POCT GLUCOSE   Result Value Ref Range    POCT Glucose 88 74 - 99 mg/dL       This patient has a central line   Reason for the central line remaining today? Dialysis/Hemapheresis                 Assessment & Plan  Complication associated with dialysis catheter  - per patient HD line sliding out and noted erythema and purulent drainage  - blood cultures are negative   - Continue Bactrim through 6/20  - wound culture - MRSA susceptible to Bactrim   - ID consultation placed - appreciate above recs, signed off.   - IR consultation placed - rewired   - Hx of MSSA/MRSA bacteremia    - Continue Vanco dwell to dialysis line   - pain/itching management: Dilaudid 0.4mg IV Q 3 hours with Benadryl 50mg IV Q 3 hours, regimen has worked for her on previous admissions   - Palliative medicine consulted and recommend Dilaudid 2 mg po q 6 hours PRN at discharge     ESRD  - HD-> M/W/F   - nephrology following     Hyperkalemia  -potassium 5.9 on admission   -refused [facility], states cannot tolerate. Give IV insulin and D50 per nephrology  -Improved     HTN  - resumed home medications  - vitals as ordered      Anemia  -Likely secondary to CKD vs ABNER  -Check iron panel, occult stool  -stable   -PPI    Seizure  - resumed home medication  - seizure precaution    CAD  - ASA and statin  - no chest pain currently    Anxiety/depression  - resumed home medications     GI prophylaxis  -PPI    DVT prophylaxis  - subcutaneous heparin     Plan  Admit to tele  Monitor fluid/electrolytes   Pain/itching regimen   Dialysis per nephrology   DVT prophylaxis  CBC and BMP in AM  Consider discharge to home in the next 24-48 hours       Vania Escalante, APRN-CNP

## 2025-06-18 NOTE — NURSING NOTE
On rounding, R femoral hemodialysis catheter dressing is current, dry and intact. Both lumens are clamped with caps securely in place.

## 2025-06-18 NOTE — CARE PLAN
Problem: Pain - Adult  Goal: Verbalizes/displays adequate comfort level or baseline comfort level  6/18/2025 0938 by Ivory Sim RN  Outcome: Progressing  6/18/2025 0938 by Ivory Sim RN  Outcome: Progressing  6/18/2025 0937 by Ivory Sim RN  Outcome: Progressing  6/18/2025 0917 by Ivory Sim RN  Outcome: Progressing   The patient's goals for the shift include Rest well & safety    The clinical goals for the shift include patient pain remain controlled

## 2025-06-19 VITALS
TEMPERATURE: 97.9 F | RESPIRATION RATE: 18 BRPM | OXYGEN SATURATION: 99 % | HEIGHT: 65 IN | SYSTOLIC BLOOD PRESSURE: 162 MMHG | BODY MASS INDEX: 25.34 KG/M2 | WEIGHT: 152.12 LBS | DIASTOLIC BLOOD PRESSURE: 92 MMHG | HEART RATE: 75 BPM

## 2025-06-19 PROBLEM — Z51.5 PALLIATIVE CARE ENCOUNTER: Status: RESOLVED | Noted: 2025-06-14 | Resolved: 2025-06-19

## 2025-06-19 PROBLEM — E87.5 HYPERKALEMIA: Status: RESOLVED | Noted: 2025-04-17 | Resolved: 2025-06-19

## 2025-06-19 PROCEDURE — 2500000002 HC RX 250 W HCPCS SELF ADMINISTERED DRUGS (ALT 637 FOR MEDICARE OP, ALT 636 FOR OP/ED): Performed by: INTERNAL MEDICINE

## 2025-06-19 PROCEDURE — 2500000001 HC RX 250 WO HCPCS SELF ADMINISTERED DRUGS (ALT 637 FOR MEDICARE OP): Performed by: NURSE PRACTITIONER

## 2025-06-19 PROCEDURE — 2500000001 HC RX 250 WO HCPCS SELF ADMINISTERED DRUGS (ALT 637 FOR MEDICARE OP): Performed by: INTERNAL MEDICINE

## 2025-06-19 PROCEDURE — 2500000004 HC RX 250 GENERAL PHARMACY W/ HCPCS (ALT 636 FOR OP/ED): Performed by: NURSE PRACTITIONER

## 2025-06-19 PROCEDURE — 2500000004 HC RX 250 GENERAL PHARMACY W/ HCPCS (ALT 636 FOR OP/ED): Mod: JZ | Performed by: INTERNAL MEDICINE

## 2025-06-19 PROCEDURE — 99239 HOSP IP/OBS DSCHRG MGMT >30: CPT | Performed by: NURSE PRACTITIONER

## 2025-06-19 RX ORDER — LIDOCAINE 560 MG/1
1 PATCH PERCUTANEOUS; TOPICAL; TRANSDERMAL DAILY
Qty: 30 PATCH | Refills: 1 | Status: SHIPPED | OUTPATIENT
Start: 2025-06-20

## 2025-06-19 RX ORDER — AMLODIPINE BESYLATE 5 MG/1
5 TABLET ORAL DAILY
Qty: 30 TABLET | Refills: 1 | Status: SHIPPED | OUTPATIENT
Start: 2025-06-20

## 2025-06-19 RX ORDER — SEVELAMER CARBONATE 800 MG/1
800 TABLET, FILM COATED ORAL
Qty: 90 TABLET | Refills: 1 | Status: SHIPPED | OUTPATIENT
Start: 2025-06-19

## 2025-06-19 RX ORDER — METHOCARBAMOL 500 MG/1
500 TABLET, FILM COATED ORAL EVERY 8 HOURS SCHEDULED
Qty: 90 TABLET | Refills: 1 | Status: SHIPPED | OUTPATIENT
Start: 2025-06-19

## 2025-06-19 RX ORDER — SULFAMETHOXAZOLE AND TRIMETHOPRIM 400; 80 MG/1; MG/1
1 TABLET ORAL 2 TIMES DAILY
Qty: 3 TABLET | Refills: 0 | Status: SHIPPED | OUTPATIENT
Start: 2025-06-19 | End: 2025-06-21

## 2025-06-19 RX ORDER — PREGABALIN 75 MG/1
75 CAPSULE ORAL DAILY
Qty: 30 CAPSULE | Refills: 1 | Status: SHIPPED | OUTPATIENT
Start: 2025-06-20

## 2025-06-19 RX ORDER — HYDROMORPHONE HYDROCHLORIDE 2 MG/1
2 TABLET ORAL EVERY 6 HOURS PRN
Qty: 12 TABLET | Refills: 0 | Status: SHIPPED | OUTPATIENT
Start: 2025-06-19

## 2025-06-19 RX ADMIN — DIPHENHYDRAMINE HYDROCHLORIDE 50 MG: 50 INJECTION, SOLUTION INTRAMUSCULAR; INTRAVENOUS at 00:39

## 2025-06-19 RX ADMIN — DIPHENHYDRAMINE HYDROCHLORIDE 50 MG: 50 INJECTION, SOLUTION INTRAMUSCULAR; INTRAVENOUS at 03:06

## 2025-06-19 RX ADMIN — CARVEDILOL 12.5 MG: 12.5 TABLET, FILM COATED ORAL at 08:08

## 2025-06-19 RX ADMIN — DIPHENHYDRAMINE HYDROCHLORIDE 50 MG: 50 INJECTION, SOLUTION INTRAMUSCULAR; INTRAVENOUS at 12:51

## 2025-06-19 RX ADMIN — SALINE NASAL SPRAY 1 SPRAY: 1.5 SOLUTION NASAL at 06:21

## 2025-06-19 RX ADMIN — DIPHENHYDRAMINE HYDROCHLORIDE 50 MG: 50 INJECTION, SOLUTION INTRAMUSCULAR; INTRAVENOUS at 09:43

## 2025-06-19 RX ADMIN — SULFAMETHOXAZOLE AND TRIMETHOPRIM 1 TABLET: 400; 80 TABLET ORAL at 08:08

## 2025-06-19 RX ADMIN — DIPHENHYDRAMINE HYDROCHLORIDE 50 MG: 50 INJECTION, SOLUTION INTRAMUSCULAR; INTRAVENOUS at 06:11

## 2025-06-19 RX ADMIN — HYDROMORPHONE HYDROCHLORIDE 0.4 MG: 1 INJECTION, SOLUTION INTRAMUSCULAR; INTRAVENOUS; SUBCUTANEOUS at 06:10

## 2025-06-19 RX ADMIN — HYDROMORPHONE HYDROCHLORIDE 0.4 MG: 1 INJECTION, SOLUTION INTRAMUSCULAR; INTRAVENOUS; SUBCUTANEOUS at 00:40

## 2025-06-19 RX ADMIN — HYDROMORPHONE HYDROCHLORIDE 0.4 MG: 1 INJECTION, SOLUTION INTRAMUSCULAR; INTRAVENOUS; SUBCUTANEOUS at 03:06

## 2025-06-19 RX ADMIN — AMLODIPINE BESYLATE 5 MG: 5 TABLET ORAL at 08:08

## 2025-06-19 RX ADMIN — HYDROMORPHONE HYDROCHLORIDE 0.4 MG: 1 INJECTION, SOLUTION INTRAMUSCULAR; INTRAVENOUS; SUBCUTANEOUS at 09:43

## 2025-06-19 RX ADMIN — CLONIDINE HYDROCHLORIDE 0.3 MG: 0.3 TABLET ORAL at 06:19

## 2025-06-19 RX ADMIN — HYDROMORPHONE HYDROCHLORIDE 0.4 MG: 1 INJECTION, SOLUTION INTRAMUSCULAR; INTRAVENOUS; SUBCUTANEOUS at 12:51

## 2025-06-19 RX ADMIN — PANTOPRAZOLE SODIUM 40 MG: 40 TABLET, DELAYED RELEASE ORAL at 06:21

## 2025-06-19 ASSESSMENT — PAIN DESCRIPTION - DESCRIPTORS
DESCRIPTORS: SHARP

## 2025-06-19 ASSESSMENT — PAIN SCALES - WONG BAKER
WONGBAKER_NUMERICALRESPONSE: HURTS WHOLE LOT
WONGBAKER_NUMERICALRESPONSE: HURTS EVEN MORE

## 2025-06-19 ASSESSMENT — PAIN SCALES - GENERAL
PAINLEVEL_OUTOF10: 10 - WORST POSSIBLE PAIN
PAINLEVEL_OUTOF10: 5 - MODERATE PAIN
PAINLEVEL_OUTOF10: 10 - WORST POSSIBLE PAIN
PAINLEVEL_OUTOF10: 6
PAINLEVEL_OUTOF10: 5 - MODERATE PAIN
PAINLEVEL_OUTOF10: 10 - WORST POSSIBLE PAIN

## 2025-06-19 ASSESSMENT — PAIN - FUNCTIONAL ASSESSMENT
PAIN_FUNCTIONAL_ASSESSMENT: 0-10

## 2025-06-19 ASSESSMENT — PAIN DESCRIPTION - ORIENTATION
ORIENTATION: RIGHT
ORIENTATION: RIGHT

## 2025-06-19 ASSESSMENT — PAIN DESCRIPTION - LOCATION
LOCATION: GROIN
LOCATION: GROIN

## 2025-06-19 NOTE — CARE PLAN
The patient's goals for the shift include Rest well & safety    The clinical goals for the shift include manage pain

## 2025-06-19 NOTE — DISCHARGE SUMMARY
Discharge Diagnosis  Complication associated with dialysis catheter       Issues Requiring Follow-Up      Discharge Meds     Medication List      START taking these medications     amLODIPine 5 mg tablet; Commonly known as: Norvasc; Take 1 tablet (5 mg)   by mouth once daily.; Start taking on: June 20, 2025   HYDROmorphone 2 mg tablet; Commonly known as: Dilaudid; Take 1 tablet (2   mg) by mouth every 6 hours if needed for severe pain (7 - 10).   lidocaine 4 % patch; Place 1 patch over 12 hours on the skin once daily.   Remove & discard patch within 12 hours or as directed by MD.; Start taking   on: June 20, 2025   methocarbamol 500 mg tablet; Commonly known as: Robaxin; Take 1 tablet   (500 mg) by mouth every 8 hours.   sevelamer carbonate 800 mg tablet; Commonly known as: Renvela; Take 1   tablet (800 mg) by mouth 3 times daily (morning, midday, late afternoon).   Swallow tablet whole; do not crush, break, or chew.   sulfamethoxazole-trimethoprim 400-80 mg tablet; Commonly known as:   Bactrim; Take 1 tablet by mouth 2 times a day for 3 doses.     CHANGE how you take these medications     pregabalin 75 mg capsule; Commonly known as: Lyrica; Take 1 capsule (75   mg) by mouth once daily.; Start taking on: June 20, 2025; What changed:   medication strength, how much to take, when to take this     CONTINUE taking these medications     acetaminophen 325 mg tablet; Commonly known as: Tylenol; Take 2 tablets   (650 mg) by mouth every 6 hours as needed for mild pain (1 - 3).   aspirin 81 mg EC tablet; Take 1 tablet (81 mg) by mouth once daily.   atorvastatin 40 mg tablet; Commonly known as: Lipitor   calcitriol 0.25 mcg capsule; Commonly known as: Rocaltrol   carvedilol 12.5 mg tablet; Commonly known as: Coreg; Take 1 tablet (12.5   mg) by mouth 2 times a day.   cloNIDine 0.3 mg tablet; Commonly known as: Catapres; Take 1 tablet (0.3   mg) by mouth every 8 hours.   epoetin brandy-epbx 20,000 unit/mL solution; Commonly known  as: Retacrit;   Inject 6,440 Units under the skin 3 times a week. Do not start before   January 17, 2024.   ergocalciferol 1250 mcg (50,000 units) capsule; Commonly known as:   Vitamin D-2   levETIRAcetam 500 mg tablet; Commonly known as: Keppra   promethazine 25 mg tablet; Commonly known as: Phenergan   vancomycin 5 mg/mL in sodium chloride 0.9% solution; 2 mL by   intra-catheter route 3 (three) times a week.     STOP taking these medications     hydrOXYzine HCL 25 mg tablet; Commonly known as: Atarax   oxyCODONE-acetaminophen 5-325 mg tablet; Commonly known as: Percocet       Test Results Pending At Discharge  Pending Labs       No current pending labs.            Hospital Course   Presented to ER 6/8 with concern for right groin HD line dislodgement. Nephrology consulted for continued dialysis while admitted. General surgery was consulted for possible Mahurkar placement, but deferred to IR for tunneled cath. On 6/10, patient had exchange of right femoral tunneled cath. ID consulted. Cultures negative.  Recommendations for Bactrim through 6/20 as well as continued plan for vancomycin dwell in dialysis line after dialysis. Patient developed severe pain at cath site. Re-evaluation by IR suggests patient has very limited possibilities for dialysis access. Palliative medicine consulted for goals of care and pain management. Plan for outpatient palliative medicine at discharge and recommendations for home-going pain management given. Vascular surgery consulted and they reviewed that as discussed last year, there are no fistula/graft options. Per nephrology recommendation, patient to schedule general surgery visit as outpatient to discuss possibility of peritoneal access. Cleared for discharge by consultants.     Pertinent Physical Exam At Time of Discharge  Physical Exam  Constitutional:       Appearance: She is ill-appearing.   HENT:      Head: Normocephalic and atraumatic.      Mouth/Throat:      Mouth: Mucous  membranes are moist.      Pharynx: Oropharynx is clear.   Eyes:      Extraocular Movements: Extraocular movements intact.      Conjunctiva/sclera: Conjunctivae normal.      Pupils: Pupils are equal, round, and reactive to light.   Cardiovascular:      Rate and Rhythm: Normal rate and regular rhythm.      Pulses: Normal pulses.      Heart sounds: Normal heart sounds.   Pulmonary:      Effort: Pulmonary effort is normal.      Breath sounds: Normal breath sounds.   Abdominal:      General: Bowel sounds are normal.      Palpations: Abdomen is soft.      Tenderness: There is no abdominal tenderness.   Musculoskeletal:         General: Normal range of motion.      Cervical back: Normal range of motion and neck supple.   Skin:     General: Skin is warm and dry.      Capillary Refill: Capillary refill takes less than 2 seconds.   Neurological:      General: No focal deficit present.      Mental Status: She is alert and oriented to person, place, and time.   Psychiatric:         Mood and Affect: Mood normal.         Behavior: Behavior normal.         Outpatient Follow-Up  No future appointments.      Vania Escalante, APRN-CNP

## 2025-06-19 NOTE — PROGRESS NOTES
Anticipate discharge soon. Patient will return home with no skilled services. [facility] notified that patient will be leaving so that they will follow up with her in the home. Will follow as needed.      06/19/25 1042   Discharge Planning   Home or Post Acute Services None   Expected Discharge Disposition Home   Does the patient need discharge transport arranged? No

## 2025-06-19 NOTE — PROGRESS NOTES
Patient seen for end-stage kidney disease and dialysis therapy she was dialyzed yesterday did very well have patient feels a little bit better no new complaints he wants to go home it is okay from renal point of view to be discharged patient will make an appointment with general surgery as an outpatient to entertain the idea of placing a peritoneal dialysis catheter.

## 2025-06-19 NOTE — CARE PLAN
Problem: Pain - Adult  Goal: Verbalizes/displays adequate comfort level or baseline comfort level  Outcome: Progressing   The patient's goals for the shift include Rest well & safety    The clinical goals for the shift include Pt will be HDS this shift

## 2025-06-26 DIAGNOSIS — Z99.2 ESRD (END STAGE RENAL DISEASE) ON DIALYSIS (MULTI): Primary | ICD-10-CM

## 2025-06-26 DIAGNOSIS — N18.6 ESRD (END STAGE RENAL DISEASE) ON DIALYSIS (MULTI): Primary | ICD-10-CM

## 2025-07-01 ENCOUNTER — PRE-ADMISSION TESTING (OUTPATIENT)
Dept: PREADMISSION TESTING | Facility: HOSPITAL | Age: 50
End: 2025-07-01
Payer: MEDICARE

## 2025-07-01 VITALS
WEIGHT: 148.15 LBS | OXYGEN SATURATION: 97 % | DIASTOLIC BLOOD PRESSURE: 101 MMHG | HEIGHT: 65 IN | SYSTOLIC BLOOD PRESSURE: 179 MMHG | BODY MASS INDEX: 24.68 KG/M2

## 2025-07-01 PROCEDURE — 99214 OFFICE O/P EST MOD 30 MIN: CPT

## 2025-07-01 ASSESSMENT — ENCOUNTER SYMPTOMS
EYES NEGATIVE: 1
HEMATOLOGIC/LYMPHATIC NEGATIVE: 1
GASTROINTESTINAL NEGATIVE: 1
ENDOCRINE NEGATIVE: 1
MUSCULOSKELETAL NEGATIVE: 1
RESPIRATORY NEGATIVE: 1
ALLERGIC/IMMUNOLOGIC NEGATIVE: 1
CONSTITUTIONAL NEGATIVE: 1
NEUROLOGICAL NEGATIVE: 1
PSYCHIATRIC NEGATIVE: 1

## 2025-07-01 ASSESSMENT — DUKE ACTIVITY SCORE INDEX (DASI)
CAN YOU TAKE CARE OF YOURSELF (EAT, DRESS, BATHE, OR USE TOILET): YES
CAN YOU DO MODERATE WORK AROUND THE HOUSE LIKE VACUUMING, SWEEPING FLOORS OR CARRYING GROCERIES: YES
CAN YOU DO HEAVY WORK AROUND THE HOUSE LIKE SCRUBBING FLOORS OR LIFTING AND MOVING HEAVY FURNITURE: NO
CAN YOU RUN A SHORT DISTANCE: NO
DASI METS SCORE: 5.4
CAN YOU PARTICIPATE IN STRENOUS SPORTS LIKE SWIMMING, SINGLES TENNIS, FOOTBALL, BASKETBALL, OR SKIING: NO
CAN YOU CLIMB A FLIGHT OF STAIRS OR WALK UP A HILL: YES
CAN YOU DO LIGHT WORK AROUND THE HOUSE LIKE DUSTING OR WASHING DISHES: YES
CAN YOU WALK A BLOCK OR TWO ON LEVEL GROUND: NO
CAN YOU HAVE SEXUAL RELATIONS: YES
CAN YOU WALK INDOORS, SUCH AS AROUND YOUR HOUSE: YES
TOTAL_SCORE: 21.45
CAN YOU DO YARD WORK LIKE RAKING LEAVES, WEEDING OR PUSHING A MOWER: NO
CAN YOU PARTICIPATE IN MODERATE RECREATIONAL ACTIVITIES LIKE GOLF, BOWLING, DANCING, DOUBLES TENNIS OR THROWING A BASEBALL OR FOOTBALL: NO

## 2025-07-01 ASSESSMENT — PAIN SCALES - GENERAL: PAINLEVEL_OUTOF10: 0 - NO PAIN

## 2025-07-01 ASSESSMENT — PAIN - FUNCTIONAL ASSESSMENT: PAIN_FUNCTIONAL_ASSESSMENT: 0-10

## 2025-07-01 NOTE — H&P (VIEW-ONLY)
CPM/PAT Evaluation       Name: Batsheva Page (Batsheva Page)  /Age: 1975/49 y.o.     In-Person       Chief Complaint: ESRD    HPI: Batsheva Page is a 49 year old female with a history of ESRD. She has been having issues with her dialysis catheter in the right groin. She recently had it exchanged this month but she is still having issues with the catheter at dialysis. She states she was able to get a full dialysis yesterday. She is scheduled for a  dialysis catheter exchange. She denies fever, chills, nausea, vomiting, chest pain, SOB, dizziness, and palpitations.     Medical History[1]    Surgical History[2]    Social History     Tobacco Use    Smoking status: Never    Smokeless tobacco: Never   Substance Use Topics    Alcohol use: Never     Social History     Substance and Sexual Activity   Drug Use Never         Family History[3]    Allergies[4]  Current Outpatient Medications   Medication Sig Dispense Refill    acetaminophen (Tylenol) 325 mg tablet Take 2 tablets (650 mg) by mouth every 6 hours as needed for mild pain (1 - 3). 30 tablet 0    amLODIPine (Norvasc) 5 mg tablet Take 1 tablet (5 mg) by mouth once daily. 30 tablet 1    aspirin 81 mg EC tablet Take 1 tablet (81 mg) by mouth once daily. 30 tablet 2    atorvastatin (Lipitor) 40 mg tablet Take 1 tablet (40 mg) by mouth once daily.      calcitriol (Rocaltrol) 0.25 mcg capsule Take 2 capsules (0.5 mcg) by mouth once daily.      carvedilol (Coreg) 12.5 mg tablet Take 1 tablet (12.5 mg) by mouth 2 times a day. 60 tablet 2    cloNIDine (Catapres) 0.3 mg tablet Take 1 tablet (0.3 mg) by mouth every 8 hours. 90 tablet 2    epoetin brandy-epbx (RETACRIT) 20,000 unit/mL solution Inject 6,440 Units under the skin 3 times a week. Do not start before 2024. 30 mL 2    HYDROmorphone (Dilaudid) 2 mg tablet Take 1 tablet (2 mg) by mouth every 6 hours if needed for severe pain (7 - 10). 12 tablet 0    levETIRAcetam (Keppra) 500 mg  tablet Take 1.5 tablets (750 mg) by mouth once daily at bedtime.      lidocaine 4 % patch Place 1 patch over 12 hours on the skin once daily. Remove & discard patch within 12 hours or as directed by MD. 30 patch 1    methocarbamol (Robaxin) 500 mg tablet Take 1 tablet (500 mg) by mouth every 8 hours. 90 tablet 1    pregabalin (Lyrica) 75 mg capsule Take 1 capsule (75 mg) by mouth once daily. (Patient taking differently: Take 1 capsule (75 mg) by mouth 2 times a day.) 30 capsule 1    promethazine (Phenergan) 25 mg tablet Take 1 tablet (25 mg) by mouth once daily as needed.      vancomycin 5 mg/mL in sodium chloride 0.9% solution 2 mL by intra-catheter route 3 (three) times a week.      ergocalciferol (Vitamin D-2) 1.25 MG (34134 UT) capsule Take 1 capsule (1.25 mg) by mouth 1 (one) time per week. FRIDAY 1     No current facility-administered medications for this visit.        Review of Systems   Constitutional: Negative.    HENT: Negative.     Eyes: Negative.    Respiratory: Negative.     Cardiovascular:  Positive for leg swelling.   Gastrointestinal: Negative.    Endocrine: Negative.    Genitourinary:         ESRD   Musculoskeletal: Negative.    Skin: Negative.    Allergic/Immunologic: Negative.    Neurological: Negative.    Hematological: Negative.    Psychiatric/Behavioral: Negative.               Physical Exam  Vitals reviewed.   Constitutional:       Appearance: Normal appearance.   HENT:      Head: Normocephalic and atraumatic.      Nose: Nose normal.      Mouth/Throat:      Mouth: Mucous membranes are moist.      Pharynx: Oropharynx is clear.   Eyes:      Extraocular Movements: Extraocular movements intact.      Conjunctiva/sclera: Conjunctivae normal.   Cardiovascular:      Rate and Rhythm: Normal rate and regular rhythm.   Pulmonary:      Effort: Pulmonary effort is normal.      Breath sounds: Normal breath sounds.   Abdominal:      General: Bowel sounds are normal.      Palpations: Abdomen is soft.  "  Genitourinary:     Comments: Assessment deferred to physician  Musculoskeletal:      Cervical back: Normal range of motion and neck supple.      Right lower leg: Edema present.      Left lower leg: Edema present.   Skin:     General: Skin is warm and dry.   Neurological:      General: No focal deficit present.      Mental Status: She is alert and oriented to person, place, and time.   Psychiatric:         Mood and Affect: Mood normal.         Behavior: Behavior normal.         Thought Content: Thought content normal.         Judgment: Judgment normal.                  Visit Vitals  BP (!) 179/101   Ht 1.651 m (5' 5\")   Wt 67.2 kg (148 lb 2.4 oz)   SpO2 97%   BMI 24.65 kg/m²   OB Status Menopausal   Smoking Status Never   BSA 1.76 m²     ASA: III  CHADS: 8.5%  RCRI: 15%  DASI: 21.45  METS: 5.4  STOP BAN        Assessment and Plan:     ESRD: Dialysis catheter exchange  Hypertension: Amlodipine, Carvedilol  Atrial fibrillation  CAD: Aspirin  Hyperlipidemia: Atorvastatin  Anemia: Epoetin Daniel-epbx  Seizures: Keppra. Last seizure over a year ago  Anxiety/depression  HX of Aortic Valve Replacement, Mitral Valve Repair and Tricuspid valve repair    EKG 25  TTE 24  CONCLUSIONS:   1. Poorly visualized anatomical structures due to suboptimal image quality.   2. The left ventricular systolic function is normal, with a visually estimated ejection fraction of 65-70%.   3. Abnormal septal motion consistent with post-operative status.   4. There is normal right ventricular global systolic function.   5. Mildly enlarged right ventricle.   6. There is a St. Devonte bioprosthetic type mitral valve prosthesis with a 27 mm reported size. The peak and mean gradients are 13 mmHg and 6 mmHg respectively.   7. Status post tricuspid valve repair.   8. Mildly elevated right ventricular systolic pressure.   9. There is Inspiris bioprosthetic type aortic valve bioprosthesis with a 21 mm reported size.  10. Echo findings are " "consistent with normal aortic valve prosthesis structure and function.  11. Echo findings are consistent with normal mitral valve prosthesis structure and function.    Paty Hinds, APRN-CNP           [1]   Past Medical History:  Diagnosis Date    Aortic valve replaced 2022    CHF (congestive heart failure)     Chronic pain     Coronary artery disease     Disease of thyroid gland     ESRD (end stage renal disease) (Multi)     H/O mitral valve replacement 2013    and 2022    Heart disease     History of transfusion     Hypertension     Mitral valve regurgitation     Seizures (Multi)     Stroke (Multi)    [2]   Past Surgical History:  Procedure Laterality Date    AORTIC VALVE REPLACEMENT  09/26/2022    bioprosthetic    CORONARY ARTERY BYPASS GRAFT      IR CVC  01/12/2024    exchange    IR CVC  05/07/2024    exchange    IR CVC  08/20/2024    removal    IR CVC TUNNELED  09/09/2022    IR CVC TUNNELED 9/9/2022 [facility] [place] [facility]IENT [facility]GAC[facility]    IR CVC TUNNELED  12/28/2022    IR CVC TUNNELED 12/28/2022 [facility] [place] INPATIENT [facility]GAC[facility]    MITRAL VALVE REPAIR  2013    MITRAL VALVE REPLACEMENT  09/26/2022    bioprosthetic    PARATHYROIDECTOMY      TRICUSPID VALVULOPLASTY  2013    US GUIDED PERCUTANEOUS PLACEMENT  07/14/2022    US GUIDED PERCUTANEOUS PLACEMENT [facility]   [3]   Family History  Problem Relation Name Age of Onset    No Known Problems Mother      No Known Problems Father     [4]   Allergies  Allergen Reactions    Compazine [Prochlorperazine] Other     anxious, agitated, \"skin crawl    Kayexalate Seizure    Reglan [Metoclopramide Hcl] Other     anxious, agitated, \"skin crawl    Zofran [Ondansetron Hcl] Headache     "

## 2025-07-01 NOTE — H&P (VIEW-ONLY)
CPM/PAT Evaluation       Name: Batsheva Page (Batsheva Page)  /Age: 1975/49 y.o.     In-Person       Chief Complaint: ESRD    HPI: Batsheva Page is a 49 year old female with a history of ESRD. She has been having issues with her dialysis catheter in the right groin. She recently had it exchanged this month but she is still having issues with the catheter at dialysis. She states she was able to get a full dialysis yesterday. She is scheduled for a  dialysis catheter exchange. She denies fever, chills, nausea, vomiting, chest pain, SOB, dizziness, and palpitations.     Medical History[1]    Surgical History[2]    Social History     Tobacco Use    Smoking status: Never    Smokeless tobacco: Never   Substance Use Topics    Alcohol use: Never     Social History     Substance and Sexual Activity   Drug Use Never         Family History[3]    Allergies[4]  Current Outpatient Medications   Medication Sig Dispense Refill    acetaminophen (Tylenol) 325 mg tablet Take 2 tablets (650 mg) by mouth every 6 hours as needed for mild pain (1 - 3). 30 tablet 0    amLODIPine (Norvasc) 5 mg tablet Take 1 tablet (5 mg) by mouth once daily. 30 tablet 1    aspirin 81 mg EC tablet Take 1 tablet (81 mg) by mouth once daily. 30 tablet 2    atorvastatin (Lipitor) 40 mg tablet Take 1 tablet (40 mg) by mouth once daily.      calcitriol (Rocaltrol) 0.25 mcg capsule Take 2 capsules (0.5 mcg) by mouth once daily.      carvedilol (Coreg) 12.5 mg tablet Take 1 tablet (12.5 mg) by mouth 2 times a day. 60 tablet 2    cloNIDine (Catapres) 0.3 mg tablet Take 1 tablet (0.3 mg) by mouth every 8 hours. 90 tablet 2    epoetin brandy-epbx (RETACRIT) 20,000 unit/mL solution Inject 6,440 Units under the skin 3 times a week. Do not start before 2024. 30 mL 2    HYDROmorphone (Dilaudid) 2 mg tablet Take 1 tablet (2 mg) by mouth every 6 hours if needed for severe pain (7 - 10). 12 tablet 0    levETIRAcetam (Keppra) 500 mg  tablet Take 1.5 tablets (750 mg) by mouth once daily at bedtime.      lidocaine 4 % patch Place 1 patch over 12 hours on the skin once daily. Remove & discard patch within 12 hours or as directed by MD. 30 patch 1    methocarbamol (Robaxin) 500 mg tablet Take 1 tablet (500 mg) by mouth every 8 hours. 90 tablet 1    pregabalin (Lyrica) 75 mg capsule Take 1 capsule (75 mg) by mouth once daily. (Patient taking differently: Take 1 capsule (75 mg) by mouth 2 times a day.) 30 capsule 1    promethazine (Phenergan) 25 mg tablet Take 1 tablet (25 mg) by mouth once daily as needed.      vancomycin 5 mg/mL in sodium chloride 0.9% solution 2 mL by intra-catheter route 3 (three) times a week.      ergocalciferol (Vitamin D-2) 1.25 MG (49685 UT) capsule Take 1 capsule (1.25 mg) by mouth 1 (one) time per week. FRIDAY 1     No current facility-administered medications for this visit.        Review of Systems   Constitutional: Negative.    HENT: Negative.     Eyes: Negative.    Respiratory: Negative.     Cardiovascular:  Positive for leg swelling.   Gastrointestinal: Negative.    Endocrine: Negative.    Genitourinary:         ESRD   Musculoskeletal: Negative.    Skin: Negative.    Allergic/Immunologic: Negative.    Neurological: Negative.    Hematological: Negative.    Psychiatric/Behavioral: Negative.               Physical Exam  Vitals reviewed.   Constitutional:       Appearance: Normal appearance.   HENT:      Head: Normocephalic and atraumatic.      Nose: Nose normal.      Mouth/Throat:      Mouth: Mucous membranes are moist.      Pharynx: Oropharynx is clear.   Eyes:      Extraocular Movements: Extraocular movements intact.      Conjunctiva/sclera: Conjunctivae normal.   Cardiovascular:      Rate and Rhythm: Normal rate and regular rhythm.   Pulmonary:      Effort: Pulmonary effort is normal.      Breath sounds: Normal breath sounds.   Abdominal:      General: Bowel sounds are normal.      Palpations: Abdomen is soft.  "  Genitourinary:     Comments: Assessment deferred to physician  Musculoskeletal:      Cervical back: Normal range of motion and neck supple.      Right lower leg: Edema present.      Left lower leg: Edema present.   Skin:     General: Skin is warm and dry.   Neurological:      General: No focal deficit present.      Mental Status: She is alert and oriented to person, place, and time.   Psychiatric:         Mood and Affect: Mood normal.         Behavior: Behavior normal.         Thought Content: Thought content normal.         Judgment: Judgment normal.                  Visit Vitals  BP (!) 179/101   Ht 1.651 m (5' 5\")   Wt 67.2 kg (148 lb 2.4 oz)   SpO2 97%   BMI 24.65 kg/m²   OB Status Menopausal   Smoking Status Never   BSA 1.76 m²     ASA: III  CHADS: 8.5%  RCRI: 15%  DASI: 21.45  METS: 5.4  STOP BAN        Assessment and Plan:     ESRD: Dialysis catheter exchange  Hypertension: Amlodipine, Carvedilol  Atrial fibrillation  CAD: Aspirin  Hyperlipidemia: Atorvastatin  Anemia: Epoetin Daniel-epbx  Seizures: Keppra. Last seizure over a year ago  Anxiety/depression  HX of Aortic Valve Replacement, Mitral Valve Repair and Tricuspid valve repair    EKG 25  TTE 24  CONCLUSIONS:   1. Poorly visualized anatomical structures due to suboptimal image quality.   2. The left ventricular systolic function is normal, with a visually estimated ejection fraction of 65-70%.   3. Abnormal septal motion consistent with post-operative status.   4. There is normal right ventricular global systolic function.   5. Mildly enlarged right ventricle.   6. There is a St. Devonte bioprosthetic type mitral valve prosthesis with a 27 mm reported size. The peak and mean gradients are 13 mmHg and 6 mmHg respectively.   7. Status post tricuspid valve repair.   8. Mildly elevated right ventricular systolic pressure.   9. There is Inspiris bioprosthetic type aortic valve bioprosthesis with a 21 mm reported size.  10. Echo findings are " "consistent with normal aortic valve prosthesis structure and function.  11. Echo findings are consistent with normal mitral valve prosthesis structure and function.    Paty Hinds, APRN-CNP           [1]   Past Medical History:  Diagnosis Date    Aortic valve replaced 2022    CHF (congestive heart failure)     Chronic pain     Coronary artery disease     Disease of thyroid gland     ESRD (end stage renal disease) (Multi)     H/O mitral valve replacement 2013    and 2022    Heart disease     History of transfusion     Hypertension     Mitral valve regurgitation     Seizures (Multi)     Stroke (Multi)    [2]   Past Surgical History:  Procedure Laterality Date    AORTIC VALVE REPLACEMENT  09/26/2022    bioprosthetic    CORONARY ARTERY BYPASS GRAFT      IR CVC  01/12/2024    exchange    IR CVC  05/07/2024    exchange    IR CVC  08/20/2024    removal    IR CVC TUNNELED  09/09/2022    IR CVC TUNNELED 9/9/2022 [place] [facility]    IR CVC TUNNELED  12/28/2022    IR CVC TUNNELED 12/28/2022 [place] [facility]    MITRAL VALVE REPAIR  2013    MITRAL VALVE REPLACEMENT  09/26/2022    bioprosthetic    PARATHYROIDECTOMY      TRICUSPID VALVULOPLASTY  2013    US GUIDED PERCUTANEOUS PLACEMENT  07/14/2022    US GUIDED PERCUTANEOUS PLACEMENT [facility]   [3]   Family History  Problem Relation Name Age of Onset    No Known Problems Mother      No Known Problems Father     [4]   Allergies  Allergen Reactions    Compazine [Prochlorperazine] Other     anxious, agitated, \"skin crawl    Kayexalate Seizure    Reglan [Metoclopramide Hcl] Other     anxious, agitated, \"skin crawl    Zofran [Ondansetron Hcl] Headache     "

## 2025-07-01 NOTE — CPM/PAT H&P
CPM/PAT Evaluation       Name: Batsheva Page (Batsheva Page)  /Age: 1975/49 y.o.     In-Person       Chief Complaint: ESRD    HPI: Batsheva Page is a 49 year old female with a history of ESRD. She has been having issues with her dialysis catheter in the right groin. She recently had it exchanged this month but she is still having issues with the catheter at dialysis. She states she was able to get a full dialysis yesterday. She is scheduled for a  dialysis catheter exchange. She denies fever, chills, nausea, vomiting, chest pain, SOB, dizziness, and palpitations.     Medical History[1]    Surgical History[2]    Social History     Tobacco Use    Smoking status: Never    Smokeless tobacco: Never   Substance Use Topics    Alcohol use: Never     Social History     Substance and Sexual Activity   Drug Use Never         Family History[3]    Allergies[4]  Current Outpatient Medications   Medication Sig Dispense Refill    acetaminophen (Tylenol) 325 mg tablet Take 2 tablets (650 mg) by mouth every 6 hours as needed for mild pain (1 - 3). 30 tablet 0    amLODIPine (Norvasc) 5 mg tablet Take 1 tablet (5 mg) by mouth once daily. 30 tablet 1    aspirin 81 mg EC tablet Take 1 tablet (81 mg) by mouth once daily. 30 tablet 2    atorvastatin (Lipitor) 40 mg tablet Take 1 tablet (40 mg) by mouth once daily.      calcitriol (Rocaltrol) 0.25 mcg capsule Take 2 capsules (0.5 mcg) by mouth once daily.      carvedilol (Coreg) 12.5 mg tablet Take 1 tablet (12.5 mg) by mouth 2 times a day. 60 tablet 2    cloNIDine (Catapres) 0.3 mg tablet Take 1 tablet (0.3 mg) by mouth every 8 hours. 90 tablet 2    epoetin brandy-epbx (RETACRIT) 20,000 unit/mL solution Inject 6,440 Units under the skin 3 times a week. Do not start before 2024. 30 mL 2    HYDROmorphone (Dilaudid) 2 mg tablet Take 1 tablet (2 mg) by mouth every 6 hours if needed for severe pain (7 - 10). 12 tablet 0    levETIRAcetam (Keppra) 500 mg  tablet Take 1.5 tablets (750 mg) by mouth once daily at bedtime.      lidocaine 4 % patch Place 1 patch over 12 hours on the skin once daily. Remove & discard patch within 12 hours or as directed by MD. 30 patch 1    methocarbamol (Robaxin) 500 mg tablet Take 1 tablet (500 mg) by mouth every 8 hours. 90 tablet 1    pregabalin (Lyrica) 75 mg capsule Take 1 capsule (75 mg) by mouth once daily. (Patient taking differently: Take 1 capsule (75 mg) by mouth 2 times a day.) 30 capsule 1    promethazine (Phenergan) 25 mg tablet Take 1 tablet (25 mg) by mouth once daily as needed.      vancomycin 5 mg/mL in sodium chloride 0.9% solution 2 mL by intra-catheter route 3 (three) times a week.      ergocalciferol (Vitamin D-2) 1.25 MG (27703 UT) capsule Take 1 capsule (1.25 mg) by mouth 1 (one) time per week. FRIDAY 1     No current facility-administered medications for this visit.        Review of Systems   Constitutional: Negative.    HENT: Negative.     Eyes: Negative.    Respiratory: Negative.     Cardiovascular:  Positive for leg swelling.   Gastrointestinal: Negative.    Endocrine: Negative.    Genitourinary:         ESRD   Musculoskeletal: Negative.    Skin: Negative.    Allergic/Immunologic: Negative.    Neurological: Negative.    Hematological: Negative.    Psychiatric/Behavioral: Negative.               Physical Exam  Vitals reviewed.   Constitutional:       Appearance: Normal appearance.   HENT:      Head: Normocephalic and atraumatic.      Nose: Nose normal.      Mouth/Throat:      Mouth: Mucous membranes are moist.      Pharynx: Oropharynx is clear.   Eyes:      Extraocular Movements: Extraocular movements intact.      Conjunctiva/sclera: Conjunctivae normal.   Cardiovascular:      Rate and Rhythm: Normal rate and regular rhythm.   Pulmonary:      Effort: Pulmonary effort is normal.      Breath sounds: Normal breath sounds.   Abdominal:      General: Bowel sounds are normal.      Palpations: Abdomen is soft.  "  Genitourinary:     Comments: Assessment deferred to physician  Musculoskeletal:      Cervical back: Normal range of motion and neck supple.      Right lower leg: Edema present.      Left lower leg: Edema present.   Skin:     General: Skin is warm and dry.   Neurological:      General: No focal deficit present.      Mental Status: She is alert and oriented to person, place, and time.   Psychiatric:         Mood and Affect: Mood normal.         Behavior: Behavior normal.         Thought Content: Thought content normal.         Judgment: Judgment normal.                  Visit Vitals  BP (!) 179/101   Ht 1.651 m (5' 5\")   Wt 67.2 kg (148 lb 2.4 oz)   SpO2 97%   BMI 24.65 kg/m²   OB Status Menopausal   Smoking Status Never   BSA 1.76 m²     ASA: III  CHADS: 8.5%  RCRI: 15%  DASI: 21.45  METS: 5.4  STOP BAN        Assessment and Plan:     ESRD: Dialysis catheter exchange  Hypertension: Amlodipine, Carvedilol  Atrial fibrillation  CAD: Aspirin  Hyperlipidemia: Atorvastatin  Anemia: Epoetin Daniel-epbx  Seizures: Keppra. Last seizure over a year ago  Anxiety/depression  HX of Aortic Valve Replacement, Mitral Valve Repair and Tricuspid valve repair    EKG 25  TTE 24  CONCLUSIONS:   1. Poorly visualized anatomical structures due to suboptimal image quality.   2. The left ventricular systolic function is normal, with a visually estimated ejection fraction of 65-70%.   3. Abnormal septal motion consistent with post-operative status.   4. There is normal right ventricular global systolic function.   5. Mildly enlarged right ventricle.   6. There is a St. Devonte bioprosthetic type mitral valve prosthesis with a 27 mm reported size. The peak and mean gradients are 13 mmHg and 6 mmHg respectively.   7. Status post tricuspid valve repair.   8. Mildly elevated right ventricular systolic pressure.   9. There is Inspiris bioprosthetic type aortic valve bioprosthesis with a 21 mm reported size.  10. Echo findings are " "consistent with normal aortic valve prosthesis structure and function.  11. Echo findings are consistent with normal mitral valve prosthesis structure and function.    Paty Hinds, APRN-CNP           [1]   Past Medical History:  Diagnosis Date    Aortic valve replaced 2022    CHF (congestive heart failure)     Chronic pain     Coronary artery disease     Disease of thyroid gland     ESRD (end stage renal disease) (Multi)     H/O mitral valve replacement 2013    and 2022    Heart disease     History of transfusion     Hypertension     Mitral valve regurgitation     Seizures (Multi)     Stroke (Multi)    [2]   Past Surgical History:  Procedure Laterality Date    AORTIC VALVE REPLACEMENT  09/26/2022    bioprosthetic    CORONARY ARTERY BYPASS GRAFT      IR CVC  01/12/2024    exchange    IR CVC  05/07/2024    exchange    IR CVC  08/20/2024    removal    IR CVC TUNNELED  09/09/2022    IR CVC TUNNELED 9/9/2022 [facility] [place] [facility]    IR CVC TUNNELED  12/28/2022    IR CVC TUNNELED 12/28/2022 [facility] [place] [facility]    MITRAL VALVE REPAIR  2013    MITRAL VALVE REPLACEMENT  09/26/2022    bioprosthetic    PARATHYROIDECTOMY      TRICUSPID VALVULOPLASTY  2013    US GUIDED PERCUTANEOUS PLACEMENT  07/14/2022    US GUIDED PERCUTANEOUS PLACEMENT [facility]   [3]   Family History  Problem Relation Name Age of Onset    No Known Problems Mother      No Known Problems Father     [4]   Allergies  Allergen Reactions    Compazine [Prochlorperazine] Other     anxious, agitated, \"skin crawl    Kayexalate Seizure    Reglan [Metoclopramide Hcl] Other     anxious, agitated, \"skin crawl    Zofran [Ondansetron Hcl] Headache     "

## 2025-07-01 NOTE — PREPROCEDURE INSTRUCTIONS
Medication List            Accurate as of July 1, 2025  8:51 AM. Always use your most recent med list.                acetaminophen 325 mg tablet  Commonly known as: Tylenol  Take 2 tablets (650 mg) by mouth every 6 hours as needed for mild pain (1 - 3).     amLODIPine 5 mg tablet  Commonly known as: Norvasc  Take 1 tablet (5 mg) by mouth once daily.     aspirin 81 mg EC tablet  Take 1 tablet (81 mg) by mouth once daily.     atorvastatin 40 mg tablet  Commonly known as: Lipitor     calcitriol 0.25 mcg capsule  Commonly known as: Rocaltrol     carvedilol 12.5 mg tablet  Commonly known as: Coreg  Take 1 tablet (12.5 mg) by mouth 2 times a day.     cloNIDine 0.3 mg tablet  Commonly known as: Catapres  Take 1 tablet (0.3 mg) by mouth every 8 hours.     epoetin brandy-epbx 20,000 unit/mL solution  Commonly known as: Retacrit  Inject 6,440 Units under the skin 3 times a week. Do not start before January 17, 2024.     ergocalciferol 1250 mcg (50,000 units) capsule  Commonly known as: Vitamin D-2     HYDROmorphone 2 mg tablet  Commonly known as: Dilaudid  Take 1 tablet (2 mg) by mouth every 6 hours if needed for severe pain (7 - 10).     levETIRAcetam 500 mg tablet  Commonly known as: Keppra     lidocaine 4 % patch  Place 1 patch over 12 hours on the skin once daily. Remove & discard patch within 12 hours or as directed by MD.     methocarbamol 500 mg tablet  Commonly known as: Robaxin  Take 1 tablet (500 mg) by mouth every 8 hours.     pregabalin 75 mg capsule  Commonly known as: Lyrica  Take 1 capsule (75 mg) by mouth once daily.     promethazine 25 mg tablet  Commonly known as: Phenergan     sevelamer carbonate 800 mg tablet  Commonly known as: Renvela  Take 1 tablet (800 mg) by mouth 3 times daily (morning, midday, late afternoon). Swallow tablet whole; do not crush, break, or chew.     vancomycin 5 mg/mL in sodium chloride 0.9% solution  2 mL by intra-catheter route 3 (three) times a week.                Why must I  stop eating and drinking near surgery time?  With sedation, food or liquid in your stomach can enter your lungs causing serious complications  Increases nausea and vomiting    When do I need to stop eating and drinking before my surgery?   Do not eat or drink after midnight the night before your surgery/procedure.  You may have small sips of water to take your medication.    NURSE FROM INTERVENTIONAL RADIOLOGY WILL CALL WITH INSTRUCTIONS    PAT DISCHARGE INSTRUCTIONS      [facility]  [id number] [place] [address], [id number] [phone]  Second Floor  *PLEASE CALL ABOVE NUMBER FOR ARRIVAL TIME     Please let your surgeon know if:      You develop any open sores, shingles, burning or painful urination as these may increase your risk of an infection.   You no longer wish to have the surgery.   Any other personal circumstances change that may lead to the need to cancel or defer this surgery-such as being sick or getting admitted to any hospital within one week of your planned procedure.    Your contact details change, such as a change of address or phone number.    Starting now:     Please DO NOT drink alcohol or smoke for 24 hours before surgery. It is well known that quitting smoking can make a huge difference to your health and recovery from surgery. The longer you abstain from smoking, the better your chances of a healthy recovery. If you need help with quitting, call 4-367-QUIT-NOW to be connected to a trained counselor who will discuss the best methods to help you quit.     On the morning of surgery:   Wear comfortable, loose fitting clothes which open in the front. Please do not wear moisturizers, creams, lotions, makeup or perfume.    Please bring with you to surgery:   Photo ID and insurance card   Current list of medicines and allergies   Pacemaker/ Defibrillator/Heart stent cards   CPAP machine and mask    Slings/ splints/ crutches   A copy of your complete advanced  directive/DHPOA.    Please do NOT bring with you to surgery:   All jewelry and valuables should be left at home.   Prosthetic devices such as contact lenses, hearing aids, dentures, eyelash extensions, hairpins and body piercings must be removed prior to going in to the surgical suite.    After outpatient surgery:   A responsible adult MUST accompany you at the time of discharge and stay with you for 24 hours after your surgery. You may NOT drive yourself home after surgery.    Do not drive, operate machinery, make critical decisions or do activities that require co-ordination or balance until after a night’s sleep.   Do not drink alcoholic beverages for 24 hours.   Instructions for resuming your medications will be provided by your surgeon.    CALL YOUR DOCTOR AFTER SURGERY IF YOU HAVE:     Chills and/or a fever of 101° F or higher.    Redness, swelling, pus or drainage from your surgical wound or a bad smell from the wound.    Lightheadedness, fainting or confusion.    Persistent vomiting (throwing up) and are not able to eat or drink for 12 hours.    Three or more loose, watery bowel movements in 24 hours (diarrhea).   Difficulty or pain while urinating( after non-urological surgery)    Pain and swelling in your legs, especially if it is only on one side.    Difficulty breathing or are breathing faster than normal.    Any new concerning symptoms.

## 2025-07-03 ENCOUNTER — ANESTHESIA EVENT (OUTPATIENT)
Dept: CARDIOLOGY | Facility: HOSPITAL | Age: 50
End: 2025-07-03
Payer: MEDICARE

## 2025-07-03 ENCOUNTER — ANESTHESIA (OUTPATIENT)
Dept: CARDIOLOGY | Facility: HOSPITAL | Age: 50
End: 2025-07-03
Payer: MEDICARE

## 2025-07-03 ENCOUNTER — HOSPITAL ENCOUNTER (OUTPATIENT)
Dept: CARDIOLOGY | Facility: HOSPITAL | Age: 50
Discharge: HOME | End: 2025-07-03
Payer: MEDICARE

## 2025-07-03 VITALS
WEIGHT: 148 LBS | BODY MASS INDEX: 24.66 KG/M2 | OXYGEN SATURATION: 96 % | HEIGHT: 65 IN | RESPIRATION RATE: 14 BRPM | HEART RATE: 57 BPM | SYSTOLIC BLOOD PRESSURE: 176 MMHG | DIASTOLIC BLOOD PRESSURE: 102 MMHG | TEMPERATURE: 97.7 F

## 2025-07-03 DIAGNOSIS — Z99.2 ESRD (END STAGE RENAL DISEASE) ON DIALYSIS (MULTI): ICD-10-CM

## 2025-07-03 DIAGNOSIS — N18.6 ESRD (END STAGE RENAL DISEASE) ON DIALYSIS (MULTI): ICD-10-CM

## 2025-07-03 LAB — B-HCG SERPL-ACNC: 3 MIU/ML

## 2025-07-03 PROCEDURE — C1894 INTRO/SHEATH, NON-LASER: HCPCS

## 2025-07-03 PROCEDURE — 2500000004 HC RX 250 GENERAL PHARMACY W/ HCPCS (ALT 636 FOR OP/ED): Performed by: RADIOLOGY

## 2025-07-03 PROCEDURE — 84702 CHORIONIC GONADOTROPIN TEST: CPT | Performed by: NURSE PRACTITIONER

## 2025-07-03 PROCEDURE — 77001 FLUOROGUIDE FOR VEIN DEVICE: CPT | Performed by: RADIOLOGY

## 2025-07-03 PROCEDURE — 36415 COLL VENOUS BLD VENIPUNCTURE: CPT | Performed by: NURSE PRACTITIONER

## 2025-07-03 PROCEDURE — 2720000007 HC OR 272 NO HCPCS

## 2025-07-03 PROCEDURE — 7100000009 HC PHASE TWO TIME - INITIAL BASE CHARGE

## 2025-07-03 PROCEDURE — 2500000005 HC RX 250 GENERAL PHARMACY W/O HCPCS: Performed by: RADIOLOGY

## 2025-07-03 PROCEDURE — 3700000002 HC GENERAL ANESTHESIA TIME - EACH INCREMENTAL 1 MINUTE

## 2025-07-03 PROCEDURE — 2500000004 HC RX 250 GENERAL PHARMACY W/ HCPCS (ALT 636 FOR OP/ED): Performed by: NURSE ANESTHETIST, CERTIFIED REGISTERED

## 2025-07-03 PROCEDURE — 3700000001 HC GENERAL ANESTHESIA TIME - INITIAL BASE CHARGE

## 2025-07-03 PROCEDURE — 7100000010 HC PHASE TWO TIME - EACH INCREMENTAL 1 MINUTE

## 2025-07-03 PROCEDURE — C1769 GUIDE WIRE: HCPCS

## 2025-07-03 PROCEDURE — 2500000005 HC RX 250 GENERAL PHARMACY W/O HCPCS: Performed by: NURSE ANESTHETIST, CERTIFIED REGISTERED

## 2025-07-03 PROCEDURE — 36581 REPLACE TUNNELED CV CATH: CPT | Performed by: RADIOLOGY

## 2025-07-03 RX ORDER — ALBUTEROL SULFATE 0.83 MG/ML
2.5 SOLUTION RESPIRATORY (INHALATION) ONCE AS NEEDED
OUTPATIENT
Start: 2025-07-03

## 2025-07-03 RX ORDER — LIDOCAINE HYDROCHLORIDE 20 MG/ML
INJECTION, SOLUTION EPIDURAL; INFILTRATION; INTRACAUDAL; PERINEURAL AS NEEDED
Status: DISCONTINUED | OUTPATIENT
Start: 2025-07-03 | End: 2025-07-03

## 2025-07-03 RX ORDER — OXYCODONE HYDROCHLORIDE 5 MG/1
5 TABLET ORAL EVERY 4 HOURS PRN
Refills: 0 | OUTPATIENT
Start: 2025-07-03

## 2025-07-03 RX ORDER — MIDAZOLAM HYDROCHLORIDE 1 MG/ML
INJECTION, SOLUTION INTRAMUSCULAR; INTRAVENOUS AS NEEDED
Status: DISCONTINUED | OUTPATIENT
Start: 2025-07-03 | End: 2025-07-03

## 2025-07-03 RX ORDER — HYDRALAZINE HYDROCHLORIDE 20 MG/ML
5 INJECTION INTRAMUSCULAR; INTRAVENOUS EVERY 30 MIN PRN
OUTPATIENT
Start: 2025-07-03

## 2025-07-03 RX ORDER — DIPHENHYDRAMINE HYDROCHLORIDE 50 MG/ML
12.5 INJECTION, SOLUTION INTRAMUSCULAR; INTRAVENOUS ONCE AS NEEDED
OUTPATIENT
Start: 2025-07-03

## 2025-07-03 RX ORDER — DEXMEDETOMIDINE IN 0.9 % NACL 20 MCG/5ML
SYRINGE (ML) INTRAVENOUS AS NEEDED
Status: DISCONTINUED | OUTPATIENT
Start: 2025-07-03 | End: 2025-07-03

## 2025-07-03 RX ORDER — CEFAZOLIN SODIUM 1 G/50ML
1 SOLUTION INTRAVENOUS ONCE
Status: DISCONTINUED | OUTPATIENT
Start: 2025-07-03 | End: 2025-07-04 | Stop reason: HOSPADM

## 2025-07-03 RX ORDER — HEPARIN SODIUM 1000 [USP'U]/ML
INJECTION, SOLUTION INTRAVENOUS; SUBCUTANEOUS AS NEEDED
Status: DISCONTINUED | OUTPATIENT
Start: 2025-07-03 | End: 2025-07-03 | Stop reason: HOSPADM

## 2025-07-03 RX ORDER — SODIUM CHLORIDE 9 MG/ML
INJECTION, SOLUTION INTRAVENOUS CONTINUOUS PRN
Status: DISCONTINUED | OUTPATIENT
Start: 2025-07-03 | End: 2025-07-03

## 2025-07-03 RX ORDER — GLYCOPYRROLATE 0.2 MG/ML
INJECTION INTRAMUSCULAR; INTRAVENOUS AS NEEDED
Status: DISCONTINUED | OUTPATIENT
Start: 2025-07-03 | End: 2025-07-03

## 2025-07-03 RX ORDER — PROPOFOL 10 MG/ML
INJECTION, EMULSION INTRAVENOUS AS NEEDED
Status: DISCONTINUED | OUTPATIENT
Start: 2025-07-03 | End: 2025-07-03

## 2025-07-03 RX ADMIN — Medication 25 MG: at 15:41

## 2025-07-03 RX ADMIN — HEPARIN SODIUM 5200 UNITS: 1000 INJECTION INTRAVENOUS; SUBCUTANEOUS at 15:59

## 2025-07-03 RX ADMIN — PROPOFOL 30 MG: 10 INJECTION, EMULSION INTRAVENOUS at 15:55

## 2025-07-03 RX ADMIN — LIDOCAINE HYDROCHLORIDE 60 MG: 20 INJECTION, SOLUTION EPIDURAL; INFILTRATION; INTRACAUDAL; PERINEURAL at 15:41

## 2025-07-03 RX ADMIN — Medication 2 L/MIN: at 15:38

## 2025-07-03 RX ADMIN — Medication 8 MCG: at 15:41

## 2025-07-03 RX ADMIN — PROPOFOL 50 MG: 10 INJECTION, EMULSION INTRAVENOUS at 15:41

## 2025-07-03 RX ADMIN — Medication 10 MG: at 15:58

## 2025-07-03 RX ADMIN — Medication 4 MCG: at 15:48

## 2025-07-03 RX ADMIN — Medication 4 MCG: at 15:58

## 2025-07-03 RX ADMIN — Medication 10 MG: at 15:52

## 2025-07-03 RX ADMIN — PROPOFOL 20 MG: 10 INJECTION, EMULSION INTRAVENOUS at 15:50

## 2025-07-03 RX ADMIN — Medication 15 MG: at 15:47

## 2025-07-03 RX ADMIN — MIDAZOLAM 2 MG: 1 INJECTION INTRAMUSCULAR; INTRAVENOUS at 15:41

## 2025-07-03 RX ADMIN — GLYCOPYRROLATE 0.3 MG: 0.2 INJECTION INTRAMUSCULAR; INTRAVENOUS at 15:44

## 2025-07-03 RX ADMIN — SODIUM CHLORIDE: 9 INJECTION, SOLUTION INTRAVENOUS at 15:36

## 2025-07-03 SDOH — HEALTH STABILITY: MENTAL HEALTH: CURRENT SMOKER: 0

## 2025-07-03 ASSESSMENT — PAIN SCALES - GENERAL
PAIN_LEVEL: 0
PAINLEVEL_OUTOF10: 8

## 2025-07-03 ASSESSMENT — PAIN - FUNCTIONAL ASSESSMENT: PAIN_FUNCTIONAL_ASSESSMENT: 0-10

## 2025-07-03 NOTE — NURSING NOTE
END OF PROCEDURE MONITORING CRITERIA  01. BP is within +/- 20% of preprocedure  02. Oxygen sat is at 92% or above on RA, or existing order for O2 treatment, or at pre-sedation levels, otherwise new O2 order needed.  03. Unless the patient has a pre-procedure history of diminished level of consciousness, s/he is easily arousable and when aroused is able to responds appropriately for his/her age.  04. Significant complications related to the specific procedure are absent, have been controlled, or have been evaluated, including:      A. Pain.      B. Wound drainage.      C. All drains and tubes are patent.      D. Nausea and Vomiting. Vomiting is not persisten and has not occurred within 15 minutes prior to discharge.      E. Bladder distention. Voided bladder and/or no symptoms or urinary retention (e.g., bladder distention, frequent voiding in small amounts).      F. Neurovascular status.      G. Level of Consciousness consistent with pre procedural status.        Significant other  affirmed that they were driving the patient home.

## 2025-07-03 NOTE — Clinical Note
The right DP pulse is 2+. Enbrel Counseling:  I discussed with the patient the risks of etanercept including but not limited to myelosuppression, immunosuppression, autoimmune hepatitis, demyelinating diseases, lymphoma, and infections.  The patient understands that monitoring is required including a PPD at baseline and must alert us or the primary physician if symptoms of infection or other concerning signs are noted.

## 2025-07-03 NOTE — ANESTHESIA PREPROCEDURE EVALUATION
Patient: Batsheva Page    Procedure Information       Anesthesia Start Date/Time: 07/03/25 1533    Scheduled providers: Chris Lott MD; CAROLINE Luz; Karlo De La Vega MD    Procedure: IR CVC EXCHANGE    Location: [facility]            Relevant Problems   Anesthesia (within normal limits)      Cardiac   (+) Arteriosclerosis of coronary artery bypass graft   (+) Benign essential hypertension   (+) Paroxysmal atrial fibrillation (Multi)   (+) Primary hypertension      Pulmonary (within normal limits)      Neuro   (+) Anxiety   (+) Depression with anxiety   (+) Major depressive disorder, recurrent, severe with psychotic symptoms (Multi)   (+) Seizure (Multi)      GI (within normal limits)      /Renal   (+) ESRD (end stage renal disease) on dialysis (Multi)   (+) End-stage renal disease on hemodialysis (Multi)   (+) Hyponatremia      Liver (within normal limits)      Endocrine (within normal limits)      Hematology   (+) Anemia of chronic disease   (+) Anemia secondary to renal failure   (+) Iron deficiency anemia      Musculoskeletal (within normal limits)      HEENT (within normal limits)      ID   (+) MRSA (methicillin resistant Staphylococcus aureus) infection   (+) MRSA bacteremia   (+) Right foot infection   (+) Septic shock (Multi)      Skin (within normal limits)      GYN (within normal limits)       Clinical information reviewed:   Tobacco  Allergies  Meds  Problems  Med Hx  Surg Hx  OB Status    Fam Hx          NPO Detail:  NPO/Void Status  Date of Last Liquid: 07/03/25  Time of Last Liquid: 0800  Date of Last Solid: 07/02/25  Time of Last Solid: 1800         Physical Exam    Airway  Mallampati: II  TM distance: >3 FB  Neck ROM: full  Mouth opening: 3 or more finger widths     Cardiovascular    Dental - normal exam     Pulmonary    Abdominal            Anesthesia Plan    History of general anesthesia?: yes  History of complications of general anesthesia?:  no    ASA 3     MAC     The patient is not a current smoker.  Patient was not previously instructed to abstain from smoking on day of procedure.  Patient did not smoke on day of procedure.  Education provided regarding risk of obstructive sleep apnea.  intravenous induction   Anesthetic plan and risks discussed with patient.    Plan discussed with CRNA.

## 2025-07-03 NOTE — ANESTHESIA POSTPROCEDURE EVALUATION
Patient: Batsheva Page    Procedure Summary       Date: 07/03/25 Room / Location: [facility]    Anesthesia Start: 1533 Anesthesia Stop: 1615    Procedure: IR CVC EXCHANGE Diagnosis:       ESRD (end stage renal disease) on dialysis (Multi)      (change permacath with MAC anasthesia)    Scheduled Providers: Chris Lott MD; ORI Luz-TINO; Karlo De La Vega MD Responsible Provider: Karlo De La Vega MD    Anesthesia Type: MAC ASA Status: 3            Anesthesia Type: MAC    Vitals Value Taken Time   BP  07/03/25 16:45   Temp  07/03/25 16:45   Pulse 57 07/03/25 16:16   Resp 14 07/03/25 16:16   SpO2 96 % 07/03/25 16:16       Anesthesia Post Evaluation    Patient location during evaluation: PACU  Patient participation: complete - patient participated  Level of consciousness: awake  Pain score: 0  Pain management: adequate  Multimodal analgesia pain management approach  Airway patency: patent  Two or more strategies used to mitigate risk of obstructive sleep apnea  Cardiovascular status: acceptable  Respiratory status: acceptable  Hydration status: acceptable  Postoperative Nausea and Vomiting: none        There were no known notable events for this encounter.

## 2025-07-09 ENCOUNTER — PREP FOR PROCEDURE (OUTPATIENT)
Dept: RADIOLOGY | Facility: HOSPITAL | Age: 50
End: 2025-07-09
Payer: MEDICARE

## 2025-07-09 DIAGNOSIS — T82.9XXA COMPLICATION ASSOCIATED WITH DIALYSIS CATHETER: Primary | ICD-10-CM

## 2025-07-10 ENCOUNTER — LAB REQUISITION (OUTPATIENT)
Dept: LAB | Facility: HOSPITAL | Age: 50
End: 2025-07-10
Payer: MEDICARE

## 2025-07-10 PROCEDURE — 87205 SMEAR GRAM STAIN: CPT

## 2025-07-10 PROCEDURE — 87075 CULTR BACTERIA EXCEPT BLOOD: CPT

## 2025-07-10 PROCEDURE — 87040 BLOOD CULTURE FOR BACTERIA: CPT

## 2025-07-11 ENCOUNTER — HOSPITAL ENCOUNTER (INPATIENT)
Dept: CARDIOLOGY | Facility: HOSPITAL | Age: 50
DRG: 314 | End: 2025-07-11
Attending: INTERNAL MEDICINE | Admitting: INTERNAL MEDICINE
Payer: MEDICARE

## 2025-07-11 ENCOUNTER — ANESTHESIA EVENT (OUTPATIENT)
Dept: CARDIOLOGY | Facility: HOSPITAL | Age: 50
End: 2025-07-11
Payer: MEDICARE

## 2025-07-11 ENCOUNTER — APPOINTMENT (OUTPATIENT)
Dept: CARDIOLOGY | Facility: HOSPITAL | Age: 50
End: 2025-07-11
Payer: MEDICARE

## 2025-07-11 ENCOUNTER — TELEPHONE (OUTPATIENT)
Dept: INTERNAL MEDICINE | Facility: HOSPITAL | Age: 50
End: 2025-07-11

## 2025-07-11 ENCOUNTER — ANESTHESIA (OUTPATIENT)
Dept: CARDIOLOGY | Facility: HOSPITAL | Age: 50
End: 2025-07-11
Payer: MEDICARE

## 2025-07-11 DIAGNOSIS — I33.0 CHRONIC BACTERIAL ENDOCARDITIS (HHS-HCC): ICD-10-CM

## 2025-07-11 DIAGNOSIS — T82.9XXA COMPLICATION ASSOCIATED WITH DIALYSIS CATHETER: ICD-10-CM

## 2025-07-11 DIAGNOSIS — Z99.2 ESRD (END STAGE RENAL DISEASE) ON DIALYSIS (MULTI): ICD-10-CM

## 2025-07-11 DIAGNOSIS — B49 FUNGEMIA: Primary | ICD-10-CM

## 2025-07-11 DIAGNOSIS — N18.6 ESRD (END STAGE RENAL DISEASE) ON DIALYSIS (MULTI): ICD-10-CM

## 2025-07-11 LAB — GLUCOSE BLD MANUAL STRIP-MCNC: 100 MG/DL (ref 74–99)

## 2025-07-11 PROCEDURE — 93005 ELECTROCARDIOGRAM TRACING: CPT

## 2025-07-11 PROCEDURE — 2500000001 HC RX 250 WO HCPCS SELF ADMINISTERED DRUGS (ALT 637 FOR MEDICARE OP): Performed by: NURSE PRACTITIONER

## 2025-07-11 PROCEDURE — 2500000001 HC RX 250 WO HCPCS SELF ADMINISTERED DRUGS (ALT 637 FOR MEDICARE OP): Performed by: ANESTHESIOLOGY

## 2025-07-11 PROCEDURE — 2500000005 HC RX 250 GENERAL PHARMACY W/O HCPCS: Performed by: INTERNAL MEDICINE

## 2025-07-11 PROCEDURE — 2500000004 HC RX 250 GENERAL PHARMACY W/ HCPCS (ALT 636 FOR OP/ED): Performed by: ANESTHESIOLOGY

## 2025-07-11 PROCEDURE — 99223 1ST HOSP IP/OBS HIGH 75: CPT | Performed by: NURSE PRACTITIONER

## 2025-07-11 PROCEDURE — 2720000007 HC OR 272 NO HCPCS

## 2025-07-11 PROCEDURE — 36580 REPLACE CVAD CATH: CPT

## 2025-07-11 PROCEDURE — C1894 INTRO/SHEATH, NON-LASER: HCPCS

## 2025-07-11 PROCEDURE — 7100000002 HC RECOVERY ROOM TIME - EACH INCREMENTAL 1 MINUTE

## 2025-07-11 PROCEDURE — 3700000001 HC GENERAL ANESTHESIA TIME - INITIAL BASE CHARGE

## 2025-07-11 PROCEDURE — 36589 REMOVAL TUNNELED CV CATH: CPT | Performed by: RADIOLOGY

## 2025-07-11 PROCEDURE — 93010 ELECTROCARDIOGRAM REPORT: CPT | Performed by: INTERNAL MEDICINE

## 2025-07-11 PROCEDURE — 82947 ASSAY GLUCOSE BLOOD QUANT: CPT

## 2025-07-11 PROCEDURE — 2500000004 HC RX 250 GENERAL PHARMACY W/ HCPCS (ALT 636 FOR OP/ED): Performed by: INTERNAL MEDICINE

## 2025-07-11 PROCEDURE — 3700000002 HC GENERAL ANESTHESIA TIME - EACH INCREMENTAL 1 MINUTE

## 2025-07-11 PROCEDURE — 2500000004 HC RX 250 GENERAL PHARMACY W/ HCPCS (ALT 636 FOR OP/ED): Performed by: NURSE PRACTITIONER

## 2025-07-11 PROCEDURE — 0JPWXXZ REMOVAL OF TUNNELED VASCULAR ACCESS DEVICE FROM LOWER EXTREMITY SUBCUTANEOUS TISSUE AND FASCIA, EXTERNAL APPROACH: ICD-10-PCS | Performed by: RADIOLOGY

## 2025-07-11 PROCEDURE — 2500000004 HC RX 250 GENERAL PHARMACY W/ HCPCS (ALT 636 FOR OP/ED): Performed by: PHARMACY

## 2025-07-11 PROCEDURE — 2550000001 HC RX 255 CONTRASTS: Performed by: RADIOLOGY

## 2025-07-11 PROCEDURE — 7100000001 HC RECOVERY ROOM TIME - INITIAL BASE CHARGE

## 2025-07-11 PROCEDURE — 2060000001 HC INTERMEDIATE ICU ROOM DAILY

## 2025-07-11 PROCEDURE — 36556 INSERT NON-TUNNEL CV CATH: CPT | Mod: RT

## 2025-07-11 RX ORDER — OXYCODONE HYDROCHLORIDE 5 MG/1
5 TABLET ORAL EVERY 4 HOURS PRN
Status: DISCONTINUED | OUTPATIENT
Start: 2025-07-11 | End: 2025-07-16

## 2025-07-11 RX ORDER — FENTANYL CITRATE 50 UG/ML
50 INJECTION, SOLUTION INTRAMUSCULAR; INTRAVENOUS EVERY 5 MIN PRN
Status: ACTIVE | OUTPATIENT
Start: 2025-07-11 | End: 2025-07-11

## 2025-07-11 RX ORDER — ATORVASTATIN CALCIUM 40 MG/1
40 TABLET, FILM COATED ORAL NIGHTLY
Status: DISCONTINUED | OUTPATIENT
Start: 2025-07-11 | End: 2025-07-17 | Stop reason: HOSPADM

## 2025-07-11 RX ORDER — LIDOCAINE HYDROCHLORIDE 20 MG/ML
INJECTION, SOLUTION INFILTRATION; PERINEURAL AS NEEDED
Status: DISCONTINUED | OUTPATIENT
Start: 2025-07-11 | End: 2025-07-11

## 2025-07-11 RX ORDER — LIDOCAINE 560 MG/1
1 PATCH PERCUTANEOUS; TOPICAL; TRANSDERMAL EVERY 24 HOURS
Status: DISCONTINUED | OUTPATIENT
Start: 2025-07-11 | End: 2025-07-17 | Stop reason: HOSPADM

## 2025-07-11 RX ORDER — METHOCARBAMOL 500 MG/1
500 TABLET, FILM COATED ORAL EVERY 8 HOURS SCHEDULED
Status: DISCONTINUED | OUTPATIENT
Start: 2025-07-11 | End: 2025-07-11

## 2025-07-11 RX ORDER — ASPIRIN 81 MG/1
81 TABLET ORAL DAILY
Status: DISCONTINUED | OUTPATIENT
Start: 2025-07-11 | End: 2025-07-17 | Stop reason: HOSPADM

## 2025-07-11 RX ORDER — HYDRALAZINE HYDROCHLORIDE 20 MG/ML
5 INJECTION INTRAMUSCULAR; INTRAVENOUS EVERY 30 MIN PRN
Status: DISCONTINUED | OUTPATIENT
Start: 2025-07-11 | End: 2025-07-11

## 2025-07-11 RX ORDER — LEVETIRACETAM 250 MG/1
750 TABLET ORAL NIGHTLY
Status: DISCONTINUED | OUTPATIENT
Start: 2025-07-11 | End: 2025-07-17 | Stop reason: HOSPADM

## 2025-07-11 RX ORDER — OXYCODONE HYDROCHLORIDE 5 MG/1
5 TABLET ORAL EVERY 4 HOURS PRN
Status: DISCONTINUED | OUTPATIENT
Start: 2025-07-11 | End: 2025-07-11 | Stop reason: SDUPTHER

## 2025-07-11 RX ORDER — MIDAZOLAM HYDROCHLORIDE 1 MG/ML
INJECTION, SOLUTION INTRAMUSCULAR; INTRAVENOUS AS NEEDED
Status: DISCONTINUED | OUTPATIENT
Start: 2025-07-11 | End: 2025-07-11

## 2025-07-11 RX ORDER — LABETALOL HYDROCHLORIDE 5 MG/ML
5 INJECTION, SOLUTION INTRAVENOUS ONCE AS NEEDED
Status: DISCONTINUED | OUTPATIENT
Start: 2025-07-11 | End: 2025-07-17 | Stop reason: HOSPADM

## 2025-07-11 RX ORDER — ERGOCALCIFEROL 1.25 MG/1
1.25 CAPSULE ORAL
Status: DISCONTINUED | OUTPATIENT
Start: 2025-07-18 | End: 2025-07-17 | Stop reason: HOSPADM

## 2025-07-11 RX ORDER — FENTANYL CITRATE 50 UG/ML
25 INJECTION, SOLUTION INTRAMUSCULAR; INTRAVENOUS EVERY 5 MIN PRN
Status: ACTIVE | OUTPATIENT
Start: 2025-07-11 | End: 2025-07-11

## 2025-07-11 RX ORDER — NITROGLYCERIN 20 MG/G
1 OINTMENT TOPICAL ONCE
Status: COMPLETED | OUTPATIENT
Start: 2025-07-11 | End: 2025-07-11

## 2025-07-11 RX ORDER — DIPHENHYDRAMINE HYDROCHLORIDE 50 MG/ML
12.5 INJECTION, SOLUTION INTRAMUSCULAR; INTRAVENOUS ONCE AS NEEDED
Status: DISCONTINUED | OUTPATIENT
Start: 2025-07-11 | End: 2025-07-11

## 2025-07-11 RX ORDER — VANCOMYCIN HYDROCHLORIDE 1 G/20ML
INJECTION, POWDER, LYOPHILIZED, FOR SOLUTION INTRAVENOUS DAILY PRN
Status: DISCONTINUED | OUTPATIENT
Start: 2025-07-11 | End: 2025-07-15

## 2025-07-11 RX ORDER — DIPHENHYDRAMINE HYDROCHLORIDE 50 MG/ML
25 INJECTION, SOLUTION INTRAMUSCULAR; INTRAVENOUS
Status: DISCONTINUED | OUTPATIENT
Start: 2025-07-11 | End: 2025-07-12

## 2025-07-11 RX ORDER — AMLODIPINE BESYLATE 5 MG/1
5 TABLET ORAL DAILY
Status: DISCONTINUED | OUTPATIENT
Start: 2025-07-12 | End: 2025-07-17 | Stop reason: HOSPADM

## 2025-07-11 RX ORDER — HYDRALAZINE HYDROCHLORIDE 20 MG/ML
INJECTION INTRAMUSCULAR; INTRAVENOUS AS NEEDED
Status: DISCONTINUED | OUTPATIENT
Start: 2025-07-11 | End: 2025-07-11

## 2025-07-11 RX ORDER — CARVEDILOL 12.5 MG/1
12.5 TABLET ORAL 2 TIMES DAILY
Status: DISCONTINUED | OUTPATIENT
Start: 2025-07-11 | End: 2025-07-17 | Stop reason: HOSPADM

## 2025-07-11 RX ORDER — LIDOCAINE HYDROCHLORIDE 10 MG/ML
0.1 INJECTION, SOLUTION INFILTRATION; PERINEURAL ONCE
Status: DISCONTINUED | OUTPATIENT
Start: 2025-07-11 | End: 2025-07-17 | Stop reason: HOSPADM

## 2025-07-11 RX ORDER — LABETALOL HYDROCHLORIDE 5 MG/ML
INJECTION, SOLUTION INTRAVENOUS AS NEEDED
Status: DISCONTINUED | OUTPATIENT
Start: 2025-07-11 | End: 2025-07-11

## 2025-07-11 RX ORDER — IODIXANOL 320 MG/ML
INJECTION, SOLUTION INTRAVASCULAR AS NEEDED
Status: DISCONTINUED | OUTPATIENT
Start: 2025-07-11 | End: 2025-07-11 | Stop reason: HOSPADM

## 2025-07-11 RX ORDER — VANCOMYCIN 1.25 G/250ML
1750 INJECTION, SOLUTION INTRAVENOUS ONCE
Status: COMPLETED | OUTPATIENT
Start: 2025-07-11 | End: 2025-07-11

## 2025-07-11 RX ORDER — HYDRALAZINE HYDROCHLORIDE 20 MG/ML
5 INJECTION INTRAMUSCULAR; INTRAVENOUS EVERY 4 HOURS PRN
Status: DISCONTINUED | OUTPATIENT
Start: 2025-07-11 | End: 2025-07-17 | Stop reason: HOSPADM

## 2025-07-11 RX ORDER — ALBUTEROL SULFATE 0.83 MG/ML
2.5 SOLUTION RESPIRATORY (INHALATION) ONCE AS NEEDED
Status: DISCONTINUED | OUTPATIENT
Start: 2025-07-11 | End: 2025-07-11

## 2025-07-11 RX ORDER — CALCITRIOL 0.25 UG/1
0.5 CAPSULE ORAL DAILY
Status: DISCONTINUED | OUTPATIENT
Start: 2025-07-11 | End: 2025-07-17 | Stop reason: HOSPADM

## 2025-07-11 RX ORDER — PROPOFOL 10 MG/ML
INJECTION, EMULSION INTRAVENOUS AS NEEDED
Status: DISCONTINUED | OUTPATIENT
Start: 2025-07-11 | End: 2025-07-11

## 2025-07-11 RX ORDER — METHOCARBAMOL 500 MG/1
500 TABLET, FILM COATED ORAL EVERY 8 HOURS SCHEDULED
Status: DISCONTINUED | OUTPATIENT
Start: 2025-07-11 | End: 2025-07-17 | Stop reason: HOSPADM

## 2025-07-11 RX ORDER — PREGABALIN 75 MG/1
75 CAPSULE ORAL DAILY
Status: DISCONTINUED | OUTPATIENT
Start: 2025-07-11 | End: 2025-07-17 | Stop reason: HOSPADM

## 2025-07-11 RX ADMIN — SODIUM CHLORIDE: 9 INJECTION, SOLUTION INTRAVENOUS at 16:18

## 2025-07-11 RX ADMIN — HYDROMORPHONE HYDROCHLORIDE 0.4 MG: 0.5 INJECTION, SOLUTION INTRAMUSCULAR; INTRAVENOUS; SUBCUTANEOUS at 20:55

## 2025-07-11 RX ADMIN — MICAFUNGIN SODIUM 100 MG: 100 INJECTION, POWDER, LYOPHILIZED, FOR SOLUTION INTRAVENOUS at 21:58

## 2025-07-11 RX ADMIN — LEVETIRACETAM 750 MG: 250 TABLET, FILM COATED ORAL at 20:27

## 2025-07-11 RX ADMIN — IODIXANOL 3 ML: 320 INJECTION, SOLUTION INTRAVASCULAR at 17:33

## 2025-07-11 RX ADMIN — CARVEDILOL 12.5 MG: 12.5 TABLET, FILM COATED ORAL at 20:27

## 2025-07-11 RX ADMIN — SODIUM CHLORIDE: 9 INJECTION, SOLUTION INTRAVENOUS at 16:17

## 2025-07-11 RX ADMIN — HYDRALAZINE HYDROCHLORIDE 5 MG: 20 INJECTION INTRAMUSCULAR; INTRAVENOUS at 16:53

## 2025-07-11 RX ADMIN — HYDRALAZINE HYDROCHLORIDE 5 MG: 20 INJECTION INTRAMUSCULAR; INTRAVENOUS at 20:30

## 2025-07-11 RX ADMIN — PROPOFOL 100 MCG/KG/MIN: 10 INJECTION, EMULSION INTRAVENOUS at 17:20

## 2025-07-11 RX ADMIN — HYDRALAZINE HYDROCHLORIDE 5 MG: 20 INJECTION INTRAMUSCULAR; INTRAVENOUS at 16:44

## 2025-07-11 RX ADMIN — LABETALOL HYDROCHLORIDE 10 MG: 5 INJECTION INTRAVENOUS at 16:35

## 2025-07-11 RX ADMIN — PROPOFOL 20 MG: 10 INJECTION, EMULSION INTRAVENOUS at 17:24

## 2025-07-11 RX ADMIN — CLONIDINE HYDROCHLORIDE 0.3 MG: 0.2 TABLET ORAL at 20:41

## 2025-07-11 RX ADMIN — DIPHENHYDRAMINE HYDROCHLORIDE 25 MG: 50 INJECTION, SOLUTION INTRAMUSCULAR; INTRAVENOUS at 18:00

## 2025-07-11 RX ADMIN — NITROGLYCERIN 1 INCH: 20 OINTMENT TOPICAL at 22:27

## 2025-07-11 RX ADMIN — PREGABALIN 75 MG: 75 CAPSULE ORAL at 20:27

## 2025-07-11 RX ADMIN — LIDOCAINE HYDROCHLORIDE 100 MG: 20 INJECTION, SOLUTION INFILTRATION; PERINEURAL at 17:21

## 2025-07-11 RX ADMIN — METHOCARBAMOL 500 MG: 500 TABLET ORAL at 20:41

## 2025-07-11 RX ADMIN — OXYCODONE HYDROCHLORIDE 5 MG: 5 TABLET ORAL at 20:27

## 2025-07-11 RX ADMIN — HYDRALAZINE HYDROCHLORIDE 10 MG: 20 INJECTION INTRAMUSCULAR; INTRAVENOUS at 17:06

## 2025-07-11 RX ADMIN — ATORVASTATIN CALCIUM 40 MG: 40 TABLET, FILM COATED ORAL at 20:27

## 2025-07-11 RX ADMIN — VANCOMYCIN 1750 MG: 1.25 INJECTION, SOLUTION INTRAVENOUS at 20:35

## 2025-07-11 RX ADMIN — PROPOFOL 20 MG: 10 INJECTION, EMULSION INTRAVENOUS at 17:29

## 2025-07-11 RX ADMIN — MIDAZOLAM 2 MG: 1 INJECTION INTRAMUSCULAR; INTRAVENOUS at 16:19

## 2025-07-11 RX ADMIN — PROPOFOL 60 MG: 10 INJECTION, EMULSION INTRAVENOUS at 17:21

## 2025-07-11 SDOH — SOCIAL STABILITY: SOCIAL INSECURITY: ARE THERE ANY APPARENT SIGNS OF INJURIES/BEHAVIORS THAT COULD BE RELATED TO ABUSE/NEGLECT?: NO

## 2025-07-11 SDOH — SOCIAL STABILITY: SOCIAL INSECURITY: DO YOU FEEL UNSAFE GOING BACK TO THE PLACE WHERE YOU ARE LIVING?: NO

## 2025-07-11 SDOH — SOCIAL STABILITY: SOCIAL INSECURITY: ARE YOU MARRIED, WIDOWED, DIVORCED, SEPARATED, NEVER MARRIED, OR LIVING WITH A PARTNER?: LIVING WITH PARTNER

## 2025-07-11 SDOH — ECONOMIC STABILITY: TRANSPORTATION INSECURITY: IN THE PAST 12 MONTHS, HAS LACK OF TRANSPORTATION KEPT YOU FROM MEDICAL APPOINTMENTS OR FROM GETTING MEDICATIONS?: NO

## 2025-07-11 SDOH — SOCIAL STABILITY: SOCIAL INSECURITY: WITHIN THE LAST YEAR, HAVE YOU BEEN AFRAID OF YOUR PARTNER OR EX-PARTNER?: NO

## 2025-07-11 SDOH — ECONOMIC STABILITY: FOOD INSECURITY: WITHIN THE PAST 12 MONTHS, YOU WORRIED THAT YOUR FOOD WOULD RUN OUT BEFORE YOU GOT THE MONEY TO BUY MORE.: NEVER TRUE

## 2025-07-11 SDOH — ECONOMIC STABILITY: FOOD INSECURITY: HOW HARD IS IT FOR YOU TO PAY FOR THE VERY BASICS LIKE FOOD, HOUSING, MEDICAL CARE, AND HEATING?: NOT HARD AT ALL

## 2025-07-11 SDOH — ECONOMIC STABILITY: HOUSING INSECURITY: AT ANY TIME IN THE PAST 12 MONTHS, WERE YOU HOMELESS OR LIVING IN A SHELTER (INCLUDING NOW)?: NO

## 2025-07-11 SDOH — SOCIAL STABILITY: SOCIAL INSECURITY: DO YOU FEEL ANYONE HAS EXPLOITED OR TAKEN ADVANTAGE OF YOU FINANCIALLY OR OF YOUR PERSONAL PROPERTY?: NO

## 2025-07-11 SDOH — SOCIAL STABILITY: SOCIAL INSECURITY: HAVE YOU HAD ANY THOUGHTS OF HARMING ANYONE ELSE?: NO

## 2025-07-11 SDOH — SOCIAL STABILITY: SOCIAL INSECURITY: WITHIN THE LAST YEAR, HAVE YOU BEEN HUMILIATED OR EMOTIONALLY ABUSED IN OTHER WAYS BY YOUR PARTNER OR EX-PARTNER?: NO

## 2025-07-11 SDOH — ECONOMIC STABILITY: FOOD INSECURITY: WITHIN THE PAST 12 MONTHS, THE FOOD YOU BOUGHT JUST DIDN'T LAST AND YOU DIDN'T HAVE MONEY TO GET MORE.: NEVER TRUE

## 2025-07-11 SDOH — ECONOMIC STABILITY: HOUSING INSECURITY: IN THE LAST 12 MONTHS, WAS THERE A TIME WHEN YOU WERE NOT ABLE TO PAY THE MORTGAGE OR RENT ON TIME?: NO

## 2025-07-11 SDOH — SOCIAL STABILITY: SOCIAL NETWORK: HOW OFTEN DO YOU GET TOGETHER WITH FRIENDS OR RELATIVES?: MORE THAN THREE TIMES A WEEK

## 2025-07-11 SDOH — SOCIAL STABILITY: SOCIAL NETWORK: HOW OFTEN DO YOU ATTEND MEETINGS OF THE CLUBS OR ORGANIZATIONS YOU BELONG TO?: NEVER

## 2025-07-11 SDOH — HEALTH STABILITY: PHYSICAL HEALTH: ON AVERAGE, HOW MANY MINUTES DO YOU ENGAGE IN EXERCISE AT THIS LEVEL?: 0 MIN

## 2025-07-11 SDOH — ECONOMIC STABILITY: HOUSING INSECURITY: DO YOU FEEL UNSAFE GOING BACK TO THE PLACE WHERE YOU LIVE?: NO

## 2025-07-11 SDOH — HEALTH STABILITY: PHYSICAL HEALTH: ON AVERAGE, HOW MANY DAYS PER WEEK DO YOU ENGAGE IN MODERATE TO STRENUOUS EXERCISE (LIKE A BRISK WALK)?: 0 DAYS

## 2025-07-11 SDOH — SOCIAL STABILITY: SOCIAL INSECURITY: DOES ANYONE TRY TO KEEP YOU FROM HAVING/CONTACTING OTHER FRIENDS OR DOING THINGS OUTSIDE YOUR HOME?: NO

## 2025-07-11 SDOH — ECONOMIC STABILITY: INCOME INSECURITY: IN THE PAST 12 MONTHS HAS THE ELECTRIC, GAS, OIL, OR WATER COMPANY THREATENED TO SHUT OFF SERVICES IN YOUR HOME?: NO

## 2025-07-11 SDOH — SOCIAL STABILITY: SOCIAL NETWORK: HOW OFTEN DO YOU ATTEND CHURCH OR RELIGIOUS SERVICES?: MORE THAN 4 TIMES PER YEAR

## 2025-07-11 SDOH — SOCIAL STABILITY: SOCIAL INSECURITY: ARE YOU OR HAVE YOU BEEN THREATENED OR ABUSED PHYSICALLY, EMOTIONALLY, OR SEXUALLY BY ANYONE?: NO

## 2025-07-11 SDOH — ECONOMIC STABILITY: HOUSING INSECURITY: IN THE PAST 12 MONTHS, HOW MANY TIMES HAVE YOU MOVED WHERE YOU WERE LIVING?: 0

## 2025-07-11 SDOH — SOCIAL STABILITY: SOCIAL INSECURITY: ABUSE: ADULT

## 2025-07-11 SDOH — SOCIAL STABILITY: SOCIAL INSECURITY
ASK PARENT OR GUARDIAN: ARE THERE TIMES WHEN YOU, YOUR CHILD(REN), OR ANY MEMBER OF YOUR HOUSEHOLD FEEL UNSAFE, HARMED, OR THREATENED AROUND PERSONS WITH WHOM YOU KNOW OR LIVE?: NO

## 2025-07-11 SDOH — SOCIAL STABILITY: SOCIAL INSECURITY: HAVE YOU HAD THOUGHTS OF HARMING ANYONE ELSE?: NO

## 2025-07-11 SDOH — SOCIAL STABILITY: SOCIAL INSECURITY: WERE YOU ABLE TO COMPLETE ALL THE BEHAVIORAL HEALTH SCREENINGS?: YES

## 2025-07-11 SDOH — SOCIAL STABILITY: SOCIAL INSECURITY: HAS ANYONE EVER THREATENED TO HURT YOUR FAMILY OR YOUR PETS?: NO

## 2025-07-11 ASSESSMENT — COGNITIVE AND FUNCTIONAL STATUS - GENERAL
PATIENT BASELINE BEDBOUND: NO
STANDING UP FROM CHAIR USING ARMS: A LITTLE
DAILY ACTIVITIY SCORE: 24
CLIMB 3 TO 5 STEPS WITH RAILING: A LITTLE
MOBILITY SCORE: 21
WALKING IN HOSPITAL ROOM: A LITTLE

## 2025-07-11 ASSESSMENT — PAIN SCALES - GENERAL
PAINLEVEL_OUTOF10: 8
PAINLEVEL_OUTOF10: 0 - NO PAIN
PAINLEVEL_OUTOF10: 10 - WORST POSSIBLE PAIN
PAINLEVEL_OUTOF10: 8
PAINLEVEL_OUTOF10: 8

## 2025-07-11 ASSESSMENT — LIFESTYLE VARIABLES
HOW OFTEN DO YOU HAVE 6 OR MORE DRINKS ON ONE OCCASION: NEVER
SUBSTANCE_ABUSE_PAST_12_MONTHS: NO
AUDIT-C TOTAL SCORE: 0
HOW OFTEN DO YOU HAVE A DRINK CONTAINING ALCOHOL: NEVER
SKIP TO QUESTIONS 9-10: 1
PRESCIPTION_ABUSE_PAST_12_MONTHS: NO
AUDIT-C TOTAL SCORE: 0
HOW MANY STANDARD DRINKS CONTAINING ALCOHOL DO YOU HAVE ON A TYPICAL DAY: PATIENT DOES NOT DRINK

## 2025-07-11 ASSESSMENT — ACTIVITIES OF DAILY LIVING (ADL)
HEARING - LEFT EAR: FUNCTIONAL
TOILETING: INDEPENDENT
HEARING - RIGHT EAR: FUNCTIONAL
WALKS IN HOME: INDEPENDENT
BATHING: INDEPENDENT
JUDGMENT_ADEQUATE_SAFELY_COMPLETE_DAILY_ACTIVITIES: YES
PATIENT'S MEMORY ADEQUATE TO SAFELY COMPLETE DAILY ACTIVITIES?: YES
ADEQUATE_TO_COMPLETE_ADL: YES
EFFECT OF PAIN ON DAILY ACTIVITIES: UNABLE TO SLEEP
GROOMING: INDEPENDENT
LACK_OF_TRANSPORTATION: NO
DRESSING YOURSELF: INDEPENDENT
LACK_OF_TRANSPORTATION: NO
FEEDING YOURSELF: INDEPENDENT

## 2025-07-11 ASSESSMENT — ENCOUNTER SYMPTOMS
NUMBNESS: 0
PALPITATIONS: 0
ABDOMINAL DISTENTION: 0
COUGH: 0
RHINORRHEA: 0
APPETITE CHANGE: 0
NAUSEA: 0
DYSURIA: 0
DIARRHEA: 0
CONSTIPATION: 0
ABDOMINAL PAIN: 0
VOMITING: 0
DIZZINESS: 0
SHORTNESS OF BREATH: 0
FATIGUE: 0
CHEST TIGHTNESS: 0
LIGHT-HEADEDNESS: 0
FEVER: 0

## 2025-07-11 ASSESSMENT — PAIN DESCRIPTION - DESCRIPTORS
DESCRIPTORS: SHARP
DESCRIPTORS: SHARP

## 2025-07-11 ASSESSMENT — PATIENT HEALTH QUESTIONNAIRE - PHQ9
1. LITTLE INTEREST OR PLEASURE IN DOING THINGS: NOT AT ALL
2. FEELING DOWN, DEPRESSED OR HOPELESS: NOT AT ALL
SUM OF ALL RESPONSES TO PHQ9 QUESTIONS 1 & 2: 0

## 2025-07-11 NOTE — ANESTHESIA POSTPROCEDURE EVALUATION
Patient: Batsheva Page    Procedure Summary       Date: 07/11/25 Room / Location: [facility]    Anesthesia Start: 1617 Anesthesia Stop: 1750    Procedure: IR CVC EXCHANGE Diagnosis:       Complication associated with dialysis catheter      Complication associated with dialysis catheter      (tdc cuff out)    Scheduled Providers: Chris Lott MD; Masoud Serrato MD Responsible Provider: Masoud Serrato MD    Anesthesia Type: general ASA Status: 3            Anesthesia Type: general    Vitals Value Taken Time   /90 07/11/25 18:31   Temp 35.4 °C (95.7 °F) 07/11/25 17:55   Pulse 64 07/11/25 18:46   Resp 28 07/11/25 18:47   SpO2 96 % 07/11/25 18:47   Vitals shown include unfiled device data.    Anesthesia Post Evaluation    Patient location during evaluation: PACU  Patient participation: complete - patient participated  Level of consciousness: awake  Pain management: adequate  Airway patency: patent  Cardiovascular status: acceptable  Respiratory status: acceptable  Hydration status: acceptable  Postoperative Nausea and Vomiting: none        No notable events documented.

## 2025-07-11 NOTE — ASSESSMENT & PLAN NOTE
Bacteremia  Blood cultures 7/10/2025 blood cultures grew yeast and Staphylococcus epidermidis  Will cover with broad-spectrum antibiotics and antifungal  Status post over the wire removal of HD catheter 7/11/2025  Follow cultures  Consult infectious disease, appreciate recommendations    Displaced hemodialysis catheter   Per IR note: 7/11/25 Over wire removal of tunneled HD catheter in right upper thigh. Sheath left in place to maintain access to be used for replacement of tunneled line once blood cultures clear     ESRD on hemodialysis  Renally dose meds, avoid nephrotoxic medications  Nephrology consulted, appreciate recommendations  Last HD 7/10/25     Hypertension  Continue antihypertensives  Monitor blood pressure close     Seizure  resumed home medication  seizure precaution     Coronary artery disease  Aspirin/statin     Anxiety/depression  resumed home medications        Plan  Monitor on telemetry  Monitor fluid/electrolytes close  Broad-spectrum antibiotics  Follow cultures  Consult ID and nephrology, appreciate recommendations  DVT prophylaxis  CBC and BMP in AM

## 2025-07-11 NOTE — ANESTHESIA PREPROCEDURE EVALUATION
Patient: Batsheva Page    Procedure Information       Date/Time: 07/11/25 1400    Scheduled providers: Chris Lott MD; Masoud Serrato MD    Procedure: IR CVC EXCHANGE    Location: [facility]            Relevant Problems   Cardiac   (+) Arteriosclerosis of coronary artery bypass graft   (+) Benign essential hypertension   (+) Paroxysmal atrial fibrillation (Multi)   (+) Primary hypertension      Neuro   (+) Anxiety   (+) Depression with anxiety   (+) Major depressive disorder, recurrent, severe with psychotic symptoms (Multi)   (+) Seizure (Multi)      /Renal   (+) ESRD (end stage renal disease) on dialysis (Multi)   (+) End-stage renal disease on hemodialysis (Multi)   (+) Hyponatremia      Hematology   (+) Anemia of chronic disease   (+) Anemia secondary to renal failure   (+) Iron deficiency anemia      ID   (+) MRSA (methicillin resistant Staphylococcus aureus) infection   (+) MRSA bacteremia   (+) Right foot infection   (+) Septic shock (Multi)     Echo 12/2024:  CONCLUSIONS:   1. The left ventricular systolic function is normal, with a visually estimated ejection fraction of 65-70%.   2. There is a [employer] bioprosthetic type mitral valve prosthesis with a 27 mm reported size.   3. Status post tricuspid valve repair.   4. Right ventricular systolic pressure is within normal limits.   5. There is [employer] bioprosthetic type aortic valve bioprosthesis with a 21 mm reported size.   6. Echo findings are consistent with normal aortic valve prosthesis structure and function.   7. Echo findings are consistent with normal mitral valve prosthesis structure and function.   8. Mass seen attached to the SVC catheter, concerning for infection.   9. There is plaque visualized in the descending aorta.    Clinical information reviewed:    Allergies                NPO Detail:  No data recorded     Physical Exam    Airway  Mallampati: II  TM distance: >3 FB  Neck ROM: full     Cardiovascular     Dental    Pulmonary    Abdominal            Anesthesia Plan    History of general anesthesia?: yes  History of complications of general anesthesia?: no    ASA 3     general     intravenous induction   Anesthetic plan and risks discussed with patient.    Plan discussed with CRNA.

## 2025-07-11 NOTE — POST-PROCEDURE NOTE
Interventional Radiology Brief Postprocedure Note      Diagnosis: Displaced Tunneled HD cathter (cuff out);  Candidemia    Description of procedure: Over wire removal of tunneled HD catheter in right upper thigh. Sheath left in place to maintain access to be used for replacement of tunneled line once blood cultures clear.    Anesthesia:  MAC Deep    Complications: None    Estimated Blood Loss: none    Medications (Filter: Administrations occurring from 1740 to 1740 on 07/11/25) As of 07/11/25 1740      None          No specimens collected      See detailed result report with images in PACS.    The patient tolerated the procedure well without incident or complication and is in stable condition.

## 2025-07-11 NOTE — CONSULTS
Vancomycin Dosing by Pharmacy- INITIAL (HEMODIALYSIS)    Batsheva Page is a 49 y.o. year old female who Pharmacy has been consulted for vancomycin dosing for line infection. Based on the patient's indication and renal status this patient will be dosed based on a Pre-HD level of 20-25 mcg/mL.     Patient is currently on hemodialysis User Schedule (TBD) (seems to be MWF usually)    Visit Vitals  BP (!) 184/90 (BP Location: Left arm, Patient Position: Lying)   Pulse 62   Temp 35.4 °C (95.7 °F) (Temporal)   Resp 22           Lab Results   Component Value Date    CREATININE 5.67 (H) 06/18/2025    CREATININE 4.48 (H) 06/17/2025    CREATININE 6.25 (H) 06/16/2025    CREATININE 4.99 (H) 06/15/2025       No intake/output data recorded.    Assessment/Plan     Initial Loading Dosing:will be given a loading dose of 2000 mg   Vancomycin maintenance dose: 500 mg after each dialysis session MWF  Pre-HD level will be obtained on 07/16/25 at AM lab draw. May be obtained sooner if clinically indicated.   Will continue to monitor renal function daily while on vancomycin and order serum creatinine at least every 48 hours if not already ordered.  Follow for continued vancomycin needs, clinical response, and signs/symptoms of toxicity.     Jagdish Bautista, CezarD

## 2025-07-11 NOTE — H&P
"Chief complaint: Line infection, bacteremia    History Of Present Illness  Batsheva Page is a 49 y.o. female with a past medical history of end-stage renal disease, on dialysis, hypertension, MRSA/MSSA bacteremia, AVR, endocarditis, seizures who presented to [facility] for dialysis catheter exchange.  Patient was recently admitted to this hospital.  During that admission she did have her dialysis catheter exchanged however continued to have difficulties with dialysis following discharge.  Blood cultures obtained on 7/10/2025 came back positive for gram-positive cocci in clusters, Staphylococcus epidermidis and yeast.      Patient underwent over wire removal of tunneled hemodialysis catheter in right upper thigh by Dr. Lott on 7/11/2025, sheath left in place to maintain access, to be used for replacement of tunneled line once blood cultures clear.  Patient seen and examined in PACU.  Drowsy but awake, limited historian.  Stating \"I cannot breathe\" and requesting to hang her legs over the bed.  She was placed on supplemental oxygen.  Her pulse ox is in the 90s.  Vital signs stable.  History obtained from the chart.  Stat CMP, CBC with differential, lactic acid, PT/INR ordered.  Will start on vancomycin and Zosyn.  Consult infectious disease and nephrology.  Patient admitted to [facility] for further evaluation and treatment.         Past Medical History  Medical History[1]    Surgical History  Surgical History[2]     Social History  She reports that she has never smoked. She has never used smokeless tobacco. She reports that she does not drink alcohol and does not use drugs.    Family History  Family History[3]     Allergies  Compazine [prochlorperazine], Kayexalate, Reglan [metoclopramide hcl], and Zofran [ondansetron hcl]    Review of Systems   Constitutional:  Negative for appetite change, fatigue and fever.   HENT:  Negative for congestion and rhinorrhea.    Respiratory:  Negative for cough, chest " "tightness and shortness of breath.    Cardiovascular:  Negative for chest pain and palpitations.   Gastrointestinal:  Negative for abdominal distention, abdominal pain, constipation, diarrhea, nausea and vomiting.   Genitourinary:  Negative for dysuria and urgency.   Neurological:  Negative for dizziness, light-headedness and numbness.   All other systems reviewed and are negative.       Physical Exam  Vitals reviewed.   Constitutional:       Appearance: Normal appearance.   HENT:      Head: Normocephalic and atraumatic.      Nose: Nose normal.      Mouth/Throat:      Mouth: Mucous membranes are moist.   Eyes:      Extraocular Movements: Extraocular movements intact.      Conjunctiva/sclera: Conjunctivae normal.   Cardiovascular:      Rate and Rhythm: Normal rate and regular rhythm.   Pulmonary:      Effort: Pulmonary effort is normal.      Breath sounds: Normal breath sounds. No wheezing, rhonchi or rales.   Abdominal:      General: Bowel sounds are normal.      Palpations: Abdomen is soft.      Tenderness: There is no abdominal tenderness.   Musculoskeletal:         General: Normal range of motion.      Cervical back: Normal range of motion and neck supple.   Skin:     General: Skin is warm and dry.      Capillary Refill: Capillary refill takes less than 2 seconds.   Neurological:      General: No focal deficit present.      Mental Status: She is alert and oriented to person, place, and time.   Psychiatric:         Mood and Affect: Mood normal.         Behavior: Behavior normal.          Last Recorded Vitals  Blood pressure (!) 230/109, pulse 57, temperature 36.7 °C (98.1 °F), temperature source Temporal, resp. rate 19, height 1.651 m (5' 5\"), weight 67.1 kg (148 lb), SpO2 93%.    Relevant Results  Lab Results   Component Value Date    GLUCOSE 84 06/18/2025    CALCIUM 7.9 (L) 06/18/2025     (L) 06/18/2025    K 4.7 06/18/2025    CO2 25 06/18/2025    CL 97 (L) 06/18/2025    BUN 36 (H) 06/18/2025    CREATININE " 5.67 (H) 06/18/2025      Lab Results   Component Value Date    WBC 4.4 06/18/2025    HGB 9.4 (L) 06/18/2025    HCT 30.0 (L) 06/18/2025    MCV 97 06/18/2025     06/18/2025    IR CVC exchange  Result Date: 7/10/2025  Interpreted By:  Chris Lott, STUDY: IR CVC EXCHANGE; 7/3/2025 4:17 pm   INDICATION: End-stage renal disease. Externalization of cuff of tunneled central venous catheter in right groin.   COMPARISON: None   ACCESSION NUMBER(S): [id number]   ORDERING CLINICIAN: SADIE BAUTISTA   TECHNIQUE: Exchange of tunneled central venous catheter without port. Fluoroscopy time 0.1 minutes; dose 0.9 mGy air kerma.   FINDINGS: Informed consent obtained. Patient positioned supine and connected to physiologic monitoring. Intravenous sedation provided by anesthesiology. All elements of maximal sterile barrier were utilized including cap, mask, sterile gown, sterile gloves, large sterile drape, hand scrub, 2% chlorhexidine for cleaning the right groin-upper thigh and indwelling catheter. Skin and subcutaneous tract around insertion site anesthetized with lidocaine. Using blunt dissection as needed, the indwelling catheter was removed over a guidewire. A new 14.5 French x 44 cm double-lumen cuffed catheter (Palindrome) was advanced over the wire with tip positioned near inferior cavoatrial junction. Both lumens aspirated and flushed freely, locked with heparin. Catheter secured and covered with dressing. Patient tolerated procedure well.       Exchange of tunneled central venous hemodialysis catheter. The catheter is ready for use.     Signed by: Chris Lott 7/10/2025 11:07 AM Dictation workstation:   [id number]       Assessment & Plan  Complication associated with dialysis catheter    Bacteremia  Blood cultures 7/10/2025 blood cultures grew yeast and Staphylococcus epidermidis  Will cover with broad-spectrum antibiotics and antifungal  Status post over the wire removal of HD catheter 7/11/2025  Follow  cultures  Consult infectious disease, appreciate recommendations    Displaced hemodialysis catheter   Per IR note: 7/11/25 Over wire removal of tunneled HD catheter in right upper thigh. Sheath left in place to maintain access to be used for replacement of tunneled line once blood cultures clear     ESRD on hemodialysis  Renally dose meds, avoid nephrotoxic medications  Nephrology consulted, appreciate recommendations  Last HD 7/10/25     Hypertension  Continue antihypertensives  Monitor blood pressure close     Seizure  resumed home medication  seizure precaution     Coronary artery disease  Aspirin/statin     Anxiety/depression  resumed home medications        Plan  Monitor on telemetry  Monitor fluid/electrolytes close  Broad-spectrum antibiotics  Follow cultures  Consult ID and nephrology, appreciate recommendations  DVT prophylaxis  CBC and BMP in AM              Noelle Doss, APRN-CNP         [1]   Past Medical History:  Diagnosis Date    Aortic valve replaced 2022    CHF (congestive heart failure)     Chronic pain     Coronary artery disease     Disease of thyroid gland     ESRD (end stage renal disease) (Multi)     H/O mitral valve replacement 2013    and 2022    Heart disease     History of transfusion     Hypertension     Mitral valve regurgitation     Seizures (Multi)     Stroke (Multi)    [2]   Past Surgical History:  Procedure Laterality Date    AORTIC VALVE REPLACEMENT  09/26/2022    bioprosthetic    CORONARY ARTERY BYPASS GRAFT      IR CVC  01/12/2024    exchange    IR CVC  05/07/2024    exchange    IR CVC  08/20/2024    removal    IR CVC TUNNELED  09/09/2022    IR CVC TUNNELED 9/9/2022 [facility]    IR CVC TUNNELED  12/28/2022    IR CVC TUNNELED 12/28/2022 [facility] INPATIENT LEGAC[facility]    MITRAL VALVE REPAIR  2013    MITRAL VALVE REPLACEMENT  09/26/2022    bioprosthetic    PARATHYROIDECTOMY      TRICUSPID VALVULOPLASTY  2013    US GUIDED PERCUTANEOUS PLACEMENT  07/14/2022    US GUIDED  PERCUTANEOUS PLACEMENT [facility]   [3]   Family History  Problem Relation Name Age of Onset    No Known Problems Mother      No Known Problems Father

## 2025-07-11 NOTE — SIGNIFICANT EVENT
"Patient arrives to PACU bay 8 awake and alert. Patient is taking simple mask off face while RN attempting report stating \"I need this off I'm awake and I can breathe\" Patient requesting to sit up in bed at this time. Patient sat up in bed per request, vitals on monitor are stable, oxygen is at 96% on RA.  Patient then requesting to \"dangle\" on the side of the bed to \"breathe better\" This RN explained to patient if she is having a hard time breathing we cannot put the bed rail down at this time. Patient then stating she needs benadryl, and pain medications. Provider Noelle REHMAN, at bedside attempting to give verbal reassurance on order sets etc. Patient doesn't appear receptive to information. Patient continues to wail intermittently followed with normal talking with normal demenor. Patient requesting \"can I get 50 of dilaudid, I mean benadryl , it has to be IV\" Patient rapidly requesting medications and specific treatment courses but doesn't appear receptive to any attempts to reassure or educate on flow of her treatment plan.   "

## 2025-07-12 LAB
ALBUMIN SERPL BCP-MCNC: 3.3 G/DL (ref 3.4–5)
ALP SERPL-CCNC: 48 U/L (ref 33–110)
ALT SERPL W P-5'-P-CCNC: 5 U/L (ref 7–45)
ANION GAP SERPL CALCULATED.3IONS-SCNC: 21 MMOL/L (ref 10–20)
AST SERPL W P-5'-P-CCNC: 11 U/L (ref 9–39)
BACTERIA BLD AEROBE CULT: ABNORMAL
BACTERIA BLD AEROBE CULT: ABNORMAL
BACTERIA BLD CULT: ABNORMAL
BACTERIA BLD CULT: ABNORMAL
BASOPHILS # BLD AUTO: 0.05 X10*3/UL (ref 0–0.1)
BASOPHILS NFR BLD AUTO: 0.6 %
BILIRUB SERPL-MCNC: 0.6 MG/DL (ref 0–1.2)
BUN SERPL-MCNC: 46 MG/DL (ref 6–23)
CALCIUM SERPL-MCNC: 7.1 MG/DL (ref 8.6–10.3)
CHLORIDE SERPL-SCNC: 95 MMOL/L (ref 98–107)
CO2 SERPL-SCNC: 22 MMOL/L (ref 21–32)
CREAT SERPL-MCNC: 8.41 MG/DL (ref 0.5–1.05)
EGFRCR SERPLBLD CKD-EPI 2021: 5 ML/MIN/1.73M*2
EOSINOPHIL # BLD AUTO: 0.32 X10*3/UL (ref 0–0.7)
EOSINOPHIL NFR BLD AUTO: 3.9 %
ERYTHROCYTE [DISTWIDTH] IN BLOOD BY AUTOMATED COUNT: 15.9 % (ref 11.5–14.5)
GLUCOSE SERPL-MCNC: 82 MG/DL (ref 74–99)
GRAM STN SPEC: ABNORMAL
GRAM STN SPEC: ABNORMAL
HCT VFR BLD AUTO: 29.3 % (ref 36–46)
HGB BLD-MCNC: 9.1 G/DL (ref 12–16)
HOLD SPECIMEN: NORMAL
HOLD SPECIMEN: NORMAL
IMM GRANULOCYTES # BLD AUTO: 0.04 X10*3/UL (ref 0–0.7)
IMM GRANULOCYTES NFR BLD AUTO: 0.5 % (ref 0–0.9)
INR PPP: 1.2 (ref 0.9–1.2)
LACTATE BLDV-SCNC: 1.2 MMOL/L (ref 0.4–2)
LYMPHOCYTES # BLD AUTO: 0.81 X10*3/UL (ref 1.2–4.8)
LYMPHOCYTES NFR BLD AUTO: 10 %
MAGNESIUM SERPL-MCNC: 1.69 MG/DL (ref 1.6–2.4)
MCH RBC QN AUTO: 30.8 PG (ref 26–34)
MCHC RBC AUTO-ENTMCNC: 31.1 G/DL (ref 32–36)
MCV RBC AUTO: 99 FL (ref 80–100)
MONOCYTES # BLD AUTO: 0.38 X10*3/UL (ref 0.1–1)
MONOCYTES NFR BLD AUTO: 4.7 %
NEUTROPHILS # BLD AUTO: 6.53 X10*3/UL (ref 1.2–7.7)
NEUTROPHILS NFR BLD AUTO: 80.3 %
NRBC BLD-RTO: 0 /100 WBCS (ref 0–0)
PLATELET # BLD AUTO: 183 X10*3/UL (ref 150–450)
POTASSIUM SERPL-SCNC: 5.1 MMOL/L (ref 3.5–5.3)
PROT SERPL-MCNC: 6.9 G/DL (ref 6.4–8.2)
PROTHROMBIN TIME: 12.6 SECONDS (ref 9.3–12.7)
RBC # BLD AUTO: 2.95 X10*6/UL (ref 4–5.2)
SODIUM SERPL-SCNC: 133 MMOL/L (ref 136–145)
VANCOMYCIN SERPL-MCNC: 36 UG/ML (ref 5–20)
WBC # BLD AUTO: 8.1 X10*3/UL (ref 4.4–11.3)

## 2025-07-12 PROCEDURE — 85610 PROTHROMBIN TIME: CPT | Performed by: NURSE PRACTITIONER

## 2025-07-12 PROCEDURE — 2500000001 HC RX 250 WO HCPCS SELF ADMINISTERED DRUGS (ALT 637 FOR MEDICARE OP): Performed by: NURSE PRACTITIONER

## 2025-07-12 PROCEDURE — 83735 ASSAY OF MAGNESIUM: CPT | Performed by: INTERNAL MEDICINE

## 2025-07-12 PROCEDURE — 99223 1ST HOSP IP/OBS HIGH 75: CPT

## 2025-07-12 PROCEDURE — 2500000004 HC RX 250 GENERAL PHARMACY W/ HCPCS (ALT 636 FOR OP/ED): Performed by: INTERNAL MEDICINE

## 2025-07-12 PROCEDURE — 2500000004 HC RX 250 GENERAL PHARMACY W/ HCPCS (ALT 636 FOR OP/ED)

## 2025-07-12 PROCEDURE — 2060000001 HC INTERMEDIATE ICU ROOM DAILY

## 2025-07-12 PROCEDURE — 85025 COMPLETE CBC W/AUTO DIFF WBC: CPT | Performed by: NURSE PRACTITIONER

## 2025-07-12 PROCEDURE — 2500000001 HC RX 250 WO HCPCS SELF ADMINISTERED DRUGS (ALT 637 FOR MEDICARE OP): Performed by: ANESTHESIOLOGY

## 2025-07-12 PROCEDURE — 80202 ASSAY OF VANCOMYCIN: CPT | Performed by: NURSE PRACTITIONER

## 2025-07-12 PROCEDURE — 36415 COLL VENOUS BLD VENIPUNCTURE: CPT | Performed by: INTERNAL MEDICINE

## 2025-07-12 PROCEDURE — 99232 SBSQ HOSP IP/OBS MODERATE 35: CPT | Performed by: NURSE PRACTITIONER

## 2025-07-12 PROCEDURE — 36415 COLL VENOUS BLD VENIPUNCTURE: CPT | Performed by: NURSE PRACTITIONER

## 2025-07-12 PROCEDURE — 87040 BLOOD CULTURE FOR BACTERIA: CPT | Mod: WESLAB | Performed by: INTERNAL MEDICINE

## 2025-07-12 PROCEDURE — 83605 ASSAY OF LACTIC ACID: CPT | Performed by: NURSE PRACTITIONER

## 2025-07-12 PROCEDURE — 2500000004 HC RX 250 GENERAL PHARMACY W/ HCPCS (ALT 636 FOR OP/ED): Mod: JW | Performed by: NURSE PRACTITIONER

## 2025-07-12 PROCEDURE — 80053 COMPREHEN METABOLIC PANEL: CPT | Performed by: NURSE PRACTITIONER

## 2025-07-12 RX ORDER — SODIUM CHLORIDE FOR INHALATION 3 %
3 VIAL, NEBULIZER (ML) INHALATION
Status: DISCONTINUED | OUTPATIENT
Start: 2025-07-12 | End: 2025-07-17 | Stop reason: HOSPADM

## 2025-07-12 RX ORDER — DIPHENHYDRAMINE HYDROCHLORIDE 50 MG/ML
50 INJECTION, SOLUTION INTRAMUSCULAR; INTRAVENOUS EVERY 4 HOURS PRN
Status: DISCONTINUED | OUTPATIENT
Start: 2025-07-12 | End: 2025-07-13

## 2025-07-12 RX ORDER — ASPIRIN 81 MG/1
81 TABLET ORAL DAILY
Status: CANCELLED | OUTPATIENT
Start: 2025-07-13

## 2025-07-12 RX ADMIN — PREGABALIN 75 MG: 75 CAPSULE ORAL at 10:47

## 2025-07-12 RX ADMIN — HYDROMORPHONE HYDROCHLORIDE 0.4 MG: 0.5 INJECTION, SOLUTION INTRAMUSCULAR; INTRAVENOUS; SUBCUTANEOUS at 14:29

## 2025-07-12 RX ADMIN — AMLODIPINE BESYLATE 5 MG: 5 TABLET ORAL at 10:48

## 2025-07-12 RX ADMIN — DIPHENHYDRAMINE HYDROCHLORIDE 25 MG: 50 INJECTION, SOLUTION INTRAMUSCULAR; INTRAVENOUS at 01:06

## 2025-07-12 RX ADMIN — HYDROMORPHONE HYDROCHLORIDE 0.4 MG: 0.5 INJECTION, SOLUTION INTRAMUSCULAR; INTRAVENOUS; SUBCUTANEOUS at 01:03

## 2025-07-12 RX ADMIN — CALCITRIOL CAPSULES 0.25 MCG 0.5 MCG: 0.25 CAPSULE ORAL at 10:55

## 2025-07-12 RX ADMIN — OXYCODONE HYDROCHLORIDE 5 MG: 5 TABLET ORAL at 20:45

## 2025-07-12 RX ADMIN — METHOCARBAMOL 500 MG: 500 TABLET ORAL at 21:00

## 2025-07-12 RX ADMIN — DIPHENHYDRAMINE HYDROCHLORIDE 25 MG: 50 INJECTION, SOLUTION INTRAMUSCULAR; INTRAVENOUS at 10:48

## 2025-07-12 RX ADMIN — CLONIDINE HYDROCHLORIDE 0.3 MG: 0.2 TABLET ORAL at 21:00

## 2025-07-12 RX ADMIN — DIPHENHYDRAMINE HYDROCHLORIDE 50 MG: 50 INJECTION, SOLUTION INTRAMUSCULAR; INTRAVENOUS at 14:44

## 2025-07-12 RX ADMIN — CARVEDILOL 12.5 MG: 12.5 TABLET, FILM COATED ORAL at 20:39

## 2025-07-12 RX ADMIN — HYDROMORPHONE HYDROCHLORIDE 0.4 MG: 0.5 INJECTION, SOLUTION INTRAMUSCULAR; INTRAVENOUS; SUBCUTANEOUS at 03:59

## 2025-07-12 RX ADMIN — HYDROMORPHONE HYDROCHLORIDE 0.4 MG: 0.5 INJECTION, SOLUTION INTRAMUSCULAR; INTRAVENOUS; SUBCUTANEOUS at 21:33

## 2025-07-12 RX ADMIN — HYDROMORPHONE HYDROCHLORIDE 0.4 MG: 0.5 INJECTION, SOLUTION INTRAMUSCULAR; INTRAVENOUS; SUBCUTANEOUS at 10:48

## 2025-07-12 RX ADMIN — CARVEDILOL 12.5 MG: 12.5 TABLET, FILM COATED ORAL at 10:48

## 2025-07-12 RX ADMIN — LEVETIRACETAM 750 MG: 250 TABLET, FILM COATED ORAL at 20:39

## 2025-07-12 RX ADMIN — MICAFUNGIN SODIUM 100 MG: 100 INJECTION, POWDER, LYOPHILIZED, FOR SOLUTION INTRAVENOUS at 20:38

## 2025-07-12 RX ADMIN — METHOCARBAMOL 500 MG: 500 TABLET ORAL at 14:44

## 2025-07-12 RX ADMIN — METHOCARBAMOL 500 MG: 500 TABLET ORAL at 05:03

## 2025-07-12 RX ADMIN — HYDROMORPHONE HYDROCHLORIDE 0.4 MG: 0.5 INJECTION, SOLUTION INTRAMUSCULAR; INTRAVENOUS; SUBCUTANEOUS at 18:27

## 2025-07-12 RX ADMIN — CLONIDINE HYDROCHLORIDE 0.3 MG: 0.2 TABLET ORAL at 14:45

## 2025-07-12 RX ADMIN — CLONIDINE HYDROCHLORIDE 0.3 MG: 0.2 TABLET ORAL at 05:03

## 2025-07-12 RX ADMIN — ATORVASTATIN CALCIUM 40 MG: 40 TABLET, FILM COATED ORAL at 20:39

## 2025-07-12 RX ADMIN — DIPHENHYDRAMINE HYDROCHLORIDE 50 MG: 50 INJECTION, SOLUTION INTRAMUSCULAR; INTRAVENOUS at 21:33

## 2025-07-12 RX ADMIN — DIPHENHYDRAMINE HYDROCHLORIDE 25 MG: 50 INJECTION, SOLUTION INTRAMUSCULAR; INTRAVENOUS at 03:59

## 2025-07-12 RX ADMIN — DIPHENHYDRAMINE HYDROCHLORIDE 50 MG: 50 INJECTION, SOLUTION INTRAMUSCULAR; INTRAVENOUS at 18:27

## 2025-07-12 SDOH — SOCIAL STABILITY: SOCIAL NETWORK: HOW OFTEN DO YOU GET TOGETHER WITH FRIENDS OR RELATIVES?: MORE THAN THREE TIMES A WEEK

## 2025-07-12 SDOH — HEALTH STABILITY: MENTAL HEALTH: HOW MANY DRINKS CONTAINING ALCOHOL DO YOU HAVE ON A TYPICAL DAY WHEN YOU ARE DRINKING?: PATIENT DOES NOT DRINK

## 2025-07-12 SDOH — ECONOMIC STABILITY: INCOME INSECURITY: IN THE PAST 12 MONTHS HAS THE ELECTRIC, GAS, OIL, OR WATER COMPANY THREATENED TO SHUT OFF SERVICES IN YOUR HOME?: NO

## 2025-07-12 SDOH — SOCIAL STABILITY: SOCIAL NETWORK: HOW OFTEN DO YOU ATTEND MEETINGS OF THE CLUBS OR ORGANIZATIONS YOU BELONG TO?: NEVER

## 2025-07-12 SDOH — SOCIAL STABILITY: SOCIAL INSECURITY: WITHIN THE LAST YEAR, HAVE YOU BEEN AFRAID OF YOUR PARTNER OR EX-PARTNER?: NO

## 2025-07-12 SDOH — HEALTH STABILITY: MENTAL HEALTH: HOW OFTEN DO YOU HAVE A DRINK CONTAINING ALCOHOL?: NEVER

## 2025-07-12 SDOH — HEALTH STABILITY: PHYSICAL HEALTH: ON AVERAGE, HOW MANY MINUTES DO YOU ENGAGE IN EXERCISE AT THIS LEVEL?: 0 MIN

## 2025-07-12 SDOH — SOCIAL STABILITY: SOCIAL INSECURITY: ARE YOU MARRIED, WIDOWED, DIVORCED, SEPARATED, NEVER MARRIED, OR LIVING WITH A PARTNER?: LIVING WITH PARTNER

## 2025-07-12 SDOH — HEALTH STABILITY: PHYSICAL HEALTH: ON AVERAGE, HOW MANY DAYS PER WEEK DO YOU ENGAGE IN MODERATE TO STRENUOUS EXERCISE (LIKE A BRISK WALK)?: 0 DAYS

## 2025-07-12 SDOH — HEALTH STABILITY: MENTAL HEALTH: HOW OFTEN DO YOU HAVE SIX OR MORE DRINKS ON ONE OCCASION?: NEVER

## 2025-07-12 SDOH — SOCIAL STABILITY: SOCIAL INSECURITY: WITHIN THE LAST YEAR, HAVE YOU BEEN HUMILIATED OR EMOTIONALLY ABUSED IN OTHER WAYS BY YOUR PARTNER OR EX-PARTNER?: NO

## 2025-07-12 SDOH — ECONOMIC STABILITY: HOUSING INSECURITY: AT ANY TIME IN THE PAST 12 MONTHS, WERE YOU HOMELESS OR LIVING IN A SHELTER (INCLUDING NOW)?: NO

## 2025-07-12 SDOH — ECONOMIC STABILITY: HOUSING INSECURITY: IN THE LAST 12 MONTHS, WAS THERE A TIME WHEN YOU WERE NOT ABLE TO PAY THE MORTGAGE OR RENT ON TIME?: NO

## 2025-07-12 SDOH — ECONOMIC STABILITY: HOUSING INSECURITY: IN THE PAST 12 MONTHS, HOW MANY TIMES HAVE YOU MOVED WHERE YOU WERE LIVING?: 0

## 2025-07-12 SDOH — ECONOMIC STABILITY: TRANSPORTATION INSECURITY: IN THE PAST 12 MONTHS, HAS LACK OF TRANSPORTATION KEPT YOU FROM MEDICAL APPOINTMENTS OR FROM GETTING MEDICATIONS?: NO

## 2025-07-12 SDOH — SOCIAL STABILITY: SOCIAL NETWORK: HOW OFTEN DO YOU ATTEND CHURCH OR RELIGIOUS SERVICES?: MORE THAN 4 TIMES PER YEAR

## 2025-07-12 SDOH — ECONOMIC STABILITY: FOOD INSECURITY: HOW HARD IS IT FOR YOU TO PAY FOR THE VERY BASICS LIKE FOOD, HOUSING, MEDICAL CARE, AND HEATING?: NOT HARD AT ALL

## 2025-07-12 SDOH — ECONOMIC STABILITY: FOOD INSECURITY: WITHIN THE PAST 12 MONTHS, YOU WORRIED THAT YOUR FOOD WOULD RUN OUT BEFORE YOU GOT THE MONEY TO BUY MORE.: NEVER TRUE

## 2025-07-12 SDOH — ECONOMIC STABILITY: FOOD INSECURITY: WITHIN THE PAST 12 MONTHS, THE FOOD YOU BOUGHT JUST DIDN'T LAST AND YOU DIDN'T HAVE MONEY TO GET MORE.: NEVER TRUE

## 2025-07-12 ASSESSMENT — PAIN SCALES - GENERAL
PAINLEVEL_OUTOF10: 3
PAINLEVEL_OUTOF10: 10 - WORST POSSIBLE PAIN
PAINLEVEL_OUTOF10: 10 - WORST POSSIBLE PAIN
PAINLEVEL_OUTOF10: 8
PAINLEVEL_OUTOF10: 10 - WORST POSSIBLE PAIN
PAINLEVEL_OUTOF10: 0 - NO PAIN
PAINLEVEL_OUTOF10: 9
PAINLEVEL_OUTOF10: 7
PAINLEVEL_OUTOF10: 8
PAINLEVEL_OUTOF10: 10 - WORST POSSIBLE PAIN
PAINLEVEL_OUTOF10: 0 - NO PAIN

## 2025-07-12 ASSESSMENT — COGNITIVE AND FUNCTIONAL STATUS - GENERAL
MOBILITY SCORE: 23
MOBILITY SCORE: 23
CLIMB 3 TO 5 STEPS WITH RAILING: A LITTLE
DAILY ACTIVITIY SCORE: 24
CLIMB 3 TO 5 STEPS WITH RAILING: A LITTLE

## 2025-07-12 ASSESSMENT — PAIN - FUNCTIONAL ASSESSMENT
PAIN_FUNCTIONAL_ASSESSMENT: 0-10

## 2025-07-12 ASSESSMENT — PAIN DESCRIPTION - DESCRIPTORS
DESCRIPTORS: SHARP

## 2025-07-12 ASSESSMENT — ENCOUNTER SYMPTOMS
MYALGIAS: 1
SHORTNESS OF BREATH: 1
NERVOUS/ANXIOUS: 1
ROS SKIN COMMENTS: ITCHING
FATIGUE: 1
FACIAL SWELLING: 1

## 2025-07-12 ASSESSMENT — ACTIVITIES OF DAILY LIVING (ADL)
LACK_OF_TRANSPORTATION: NO
EFFECT OF PAIN ON DAILY ACTIVITIES: UNABLE TO SLEEP

## 2025-07-12 ASSESSMENT — LIFESTYLE VARIABLES
AUDIT-C TOTAL SCORE: 0
SKIP TO QUESTIONS 9-10: 1

## 2025-07-12 ASSESSMENT — PAIN DESCRIPTION - ORIENTATION
ORIENTATION: RIGHT
ORIENTATION: RIGHT

## 2025-07-12 ASSESSMENT — PAIN DESCRIPTION - LOCATION
LOCATION: GROIN
LOCATION: GROIN

## 2025-07-12 NOTE — NURSING NOTE
"Pt c/o \" difficulty breathing\" \" chest tightness\". Appears anxious.   4liters o2 applied for comfort only.  EKG completed.  Dr Francis at bedside, updated and evaluated.  Vitals as charted.  Bp improved from earlier.  Pt encouraged.  Orders to be entered for nitro paste  "

## 2025-07-12 NOTE — CONSULTS
Consults    Reason For Consult  Reason for Consult: communication / medical decision making     History Of Present Illness  Batsheva Page is a 49 y.o. female with past medical history of ESRD / dialysis dependent (limited access now through a femoral tunneled cath) is to follow up for consideration of peritoneal dialysis (PD), has had several dialysis cath associated infections with MRSA and MSSA, endocarditis, Aortic valve replaced (2022), HFpEF, Chronic pain, Coronary artery disease, Disease of thyroid gland, H/O mitral valve replacement (2013), Mitral valve regurgitation, Seizures, and Stroke, anxiety and depression.     The patient was having trouble with her access site during outpatient dialysis.  Last dialysis was Thursday per the patient. Subsequently underwent over wire removal of tunneled hemodialysis catheter in right upper thigh by Dr. Lott on 7/11/2025, sheath left in place to maintain access, to be used for replacement of tunneled line once blood cultures clear.  In the PACU patient became very short of breath thought to be due to fluid overload and was therefore admitted to our hospital. Currently on 4L O2 via NC. Not on home O2. Cultures from the line showed Staph and Candida. Is currently on Vanco and Micafungin. Palliative care was consulted for goals of care.      Symptoms (0 - 10, Best to Worst)  [place] Symptom Assessment System  0-10 (Numeric) Pain Score: 8    BM in last 48 hours? yes    Emotional/Psychological/Spiritual Needs  Over the past two weeks, how often has the patient been bothered by having little interest or pleasure in doing things?  occurrence (last two weeks): not at all    Over the past two weeks, how often has the patient been bothered by feeling down, depressed, or hopeless?  occurrence (last two weeks): not at all    Screening for spiritual needs?  Yes  Quaker and importance of Catholic: N/A  Source for spiritual support/meaning: N/A       Serious Illness  Conversation  What is your understanding now of where you are with your illness:  Has full understanding  How much information about what is likely to be ahead with your illness   would you like from me: full  What are your most important goals if your health situation worsens:  maintaining independence  What are your biggest fears and worries about the future with your health:  not continuing dialysis   What gives you strength as you think about the future with your illness:  family  What abilities are so critical to your life that you can’t imagine living without them:  family  If you become sicker, how much are you willing to go through for the possibility of gaining more time:  ongoing discussion  How much does your family know about your priorities and wishes:  they have full understanding    Personal/Social History  She reports that she has never smoked. She has never used smokeless tobacco. She reports that she does not drink alcohol and does not use drugs.    Caregiving/Caregiver Support  Does the patient require assistance in some or all components of his care, including coordination of medical care? No    Caregiver emotional or practical needs:      Past Medical History  She has a past medical history of Aortic valve replaced (2022), CHF (congestive heart failure), Chronic pain, Coronary artery disease, Disease of thyroid gland, ESRD (end stage renal disease) (Multi), H/O mitral valve replacement (2013), Heart disease, History of transfusion, Hypertension, Mitral valve regurgitation, Seizures (Multi), and Stroke (Multi).    Surgical History  She has a past surgical history that includes IR CVC tunneled (09/09/2022); IR CVC tunneled (12/28/2022); US guided percutaneous placement (07/14/2022); Parathyroidectomy; Coronary artery bypass graft; Aortic valve replacement (09/26/2022); Mitral valve replacement (09/26/2022); Mitral valve repair (2013); Tricuspid valvuloplasty (2013); IR CVC (01/12/2024); IR CVC  "(05/07/2024); and IR CVC (08/20/2024).     Family History  Family History[1]  Allergies  Compazine [prochlorperazine], Kayexalate, Reglan [metoclopramide hcl], and Zofran [ondansetron hcl]    Review of Systems   Constitutional:  Positive for fatigue.   HENT:  Positive for facial swelling.    Respiratory:  Positive for shortness of breath.    Cardiovascular:  Positive for leg swelling.   Musculoskeletal:  Positive for myalgias.   Skin:         Itching    Psychiatric/Behavioral:  The patient is nervous/anxious.         Physical Exam  Vitals and nursing note reviewed.   Constitutional:       Appearance: She is ill-appearing.   HENT:      Head: Normocephalic and atraumatic.      Mouth/Throat:      Mouth: Mucous membranes are moist.   Eyes:      Pupils: Pupils are equal, round, and reactive to light.   Cardiovascular:      Rate and Rhythm: Normal rate and regular rhythm.   Pulmonary:      Effort: No respiratory distress.      Comments: O2 via NC   Abdominal:      General: Bowel sounds are normal.   Musculoskeletal:         General: Swelling present.      Cervical back: Neck supple.      Comments: Face, R hand, and R leg swelling   Skin:     General: Skin is dry.      Capillary Refill: Capillary refill takes 2 to 3 seconds.   Neurological:      Mental Status: She is oriented to person, place, and time.         Last Recorded Vitals  Blood pressure 134/72, pulse 67, temperature 36.7 °C (98.1 °F), temperature source Temporal, resp. rate 17, height 1.651 m (5' 5\"), weight 75.8 kg (167 lb 1.7 oz), SpO2 98%.    Relevant Results  Results for orders placed or performed during the hospital encounter of 07/11/25 (from the past 24 hours)   POCT GLUCOSE   Result Value Ref Range    POCT Glucose 100 (H) 74 - 99 mg/dL   PST Top   Result Value Ref Range    Extra Tube Hold for add-ons.    Lavender Top   Result Value Ref Range    Extra Tube Hold for add-ons.    CBC and Auto Differential   Result Value Ref Range    WBC 8.1 4.4 - 11.3 " x10*3/uL    nRBC 0.0 0.0 - 0.0 /100 WBCs    RBC 2.95 (L) 4.00 - 5.20 x10*6/uL    Hemoglobin 9.1 (L) 12.0 - 16.0 g/dL    Hematocrit 29.3 (L) 36.0 - 46.0 %    MCV 99 80 - 100 fL    MCH 30.8 26.0 - 34.0 pg    MCHC 31.1 (L) 32.0 - 36.0 g/dL    RDW 15.9 (H) 11.5 - 14.5 %    Platelets 183 150 - 450 x10*3/uL    Neutrophils % 80.3 40.0 - 80.0 %    Immature Granulocytes %, Automated 0.5 0.0 - 0.9 %    Lymphocytes % 10.0 13.0 - 44.0 %    Monocytes % 4.7 2.0 - 10.0 %    Eosinophils % 3.9 0.0 - 6.0 %    Basophils % 0.6 0.0 - 2.0 %    Neutrophils Absolute 6.53 1.20 - 7.70 x10*3/uL    Immature Granulocytes Absolute, Automated 0.04 0.00 - 0.70 x10*3/uL    Lymphocytes Absolute 0.81 (L) 1.20 - 4.80 x10*3/uL    Monocytes Absolute 0.38 0.10 - 1.00 x10*3/uL    Eosinophils Absolute 0.32 0.00 - 0.70 x10*3/uL    Basophils Absolute 0.05 0.00 - 0.10 x10*3/uL   Comprehensive metabolic panel   Result Value Ref Range    Glucose 82 74 - 99 mg/dL    Sodium 133 (L) 136 - 145 mmol/L    Potassium 5.1 3.5 - 5.3 mmol/L    Chloride 95 (L) 98 - 107 mmol/L    Bicarbonate 22 21 - 32 mmol/L    Anion Gap 21 (H) 10 - 20 mmol/L    Urea Nitrogen 46 (H) 6 - 23 mg/dL    Creatinine 8.41 (H) 0.50 - 1.05 mg/dL    eGFR 5 (L) >60 mL/min/1.73m*2    Calcium 7.1 (L) 8.6 - 10.3 mg/dL    Albumin 3.3 (L) 3.4 - 5.0 g/dL    Alkaline Phosphatase 48 33 - 110 U/L    Total Protein 6.9 6.4 - 8.2 g/dL    AST 11 9 - 39 U/L    Bilirubin, Total 0.6 0.0 - 1.2 mg/dL    ALT 5 (L) 7 - 45 U/L   Protime-INR   Result Value Ref Range    Protime 12.6 9.3 - 12.7 seconds    INR 1.2 0.9 - 1.2   Blood Gas Lactic Acid, Venous   Result Value Ref Range    POCT Lactate, Venous 1.2 0.4 - 2.0 mmol/L   Magnesium   Result Value Ref Range    Magnesium 1.69 1.60 - 2.40 mg/dL   Vancomycin   Result Value Ref Range    Vancomycin 36.0 (H) 5.0 - 20.0 ug/mL           Assessment/Plan   IMP:    Bacteremia  Hemodialysis cath infection  ESRD on hemodialysis  Pain   Anxiety  Benadryl IV q4h as needed for pain and  itching, states this   Palliative Care   DNR CCA DNI   The patient has decision-making capacity  Daughter Xiao is DPOA-HC, document in EMR    7/12/2025  Discussion with the patient regarding goals of care.  We did discuss former chrome status, the patient stated that she would like to continue the CODE STATUS of DNR CCA DNI.  We also discussed dialysis options.  She stated that she still plans to consult with a PD nephrologist for consideration for PD access outpatient.  The plan is to continue her treatment medical care.  As far as pain management updates patient states her pain is controlled on her current regimen.  She does state that her anxiety could be managed better is asking for an increase in dose of Benadryl.  I did increase from 25 mg every 3 hours to 50 mg every 4 hours.  Also with complaints of chest congestion.  I did order sodium chloride nebulizer.  Will monitor for effectiveness.    Symptom Management  Pain: moderate  Medications recommended for pain?  Yes  Tiredness: no  Nausea: no  Depression: no  Anxiety: mild  Drowsiness: no  Appetite: good  Wellbeing: fair  Dyspnea: mild  Intervention recommended for dyspnea?  yes  Other:   Intervention recommended for constipation?  No    Goals of Care  Treatment model of care.    I spent 90 minutes in the professional and overall care of this patient.  Palliative care will continue to follow.      Vannesa He, APRN-CNP           [1]   Family History  Problem Relation Name Age of Onset    No Known Problems Mother      No Known Problems Father

## 2025-07-12 NOTE — CARE PLAN
The patient's goals for the shift include  pain control    The clinical goals for the shift include HDS, pain cintrol    Over the shift, the patient did not make progress toward the following goals. Barriers to progression include frequent anxiety and pain. Recommendations to address these barriers include rest and medications.

## 2025-07-12 NOTE — PROGRESS NOTES
07/12/25 1700   Physical Activity   On average, how many days per week do you engage in moderate to strenuous exercise (like a brisk walk)? 0 days   On average, how many minutes do you engage in exercise at this level? 0 min   Financial Resource Strain   How hard is it for you to pay for the very basics like food, housing, medical care, and heating? Not hard   Housing Stability   In the last 12 months, was there a time when you were not able to pay the mortgage or rent on time? N   In the past 12 months, how many times have you moved where you were living? 0   At any time in the past 12 months, were you homeless or living in a shelter (including now)? N   Transportation Needs   In the past 12 months, has lack of transportation kept you from medical appointments or from getting medications? no   In the past 12 months, has lack of transportation kept you from meetings, work, or from getting things needed for daily living? No   Food Insecurity   Within the past 12 months, you worried that your food would run out before you got the money to buy more. Never true   Within the past 12 months, the food you bought just didn't last and you didn't have money to get more. Never true   Stress   Do you feel stress - tense, restless, nervous, or anxious, or unable to sleep at night because your mind is troubled all the time - these days? Not at all   Social Connections   In a typical week, how many times do you talk on the phone with family, friends, or neighbors? More than 3   How often do you get together with friends or relatives? More than 3   How often do you attend Voodoo or Samaritan services? More than 4   Do you belong to any clubs or organizations such as Voodoo groups, unions, fraternal or athletic groups, or school groups? No   How often do you attend meetings of the clubs or organizations you belong to? Never   Are you , , , , never , or living with a partner? Living with    Intimate Partner Violence   Within the last year, have you been afraid of your partner or ex-partner? No   Within the last year, have you been humiliated or emotionally abused in other ways by your partner or ex-partner? No   Within the last year, have you been kicked, hit, slapped, or otherwise physically hurt by your partner or ex-partner? No   Within the last year, have you been raped or forced to have any kind of sexual activity by your partner or ex-partner? No   Alcohol Use   Q1: How often do you have a drink containing alcohol? Never   Q2: How many drinks containing alcohol do you have on a typical day when you are drinking? None   Q3: How often do you have six or more drinks on one occasion? Never   Utilities   In the past 12 months has the electric, gas, oil, or water company threatened to shut off services in your home? No   Health Literacy   How often do you need to have someone help you when you read instructions, pamphlets, or other written material from your doctor or pharmacy? Never   Follow-Ups   We make community resources available to all of our patients to assist with everyday needs. We may be able to connect you with those resources. Would you be interested? N

## 2025-07-12 NOTE — PROGRESS NOTES
Batsheva Page is a 49 y.o. female on day 1 of admission presenting with Dialysis catheter complications      Subjective   Patient today is lying in bed, alert and sleepy. She had overnight SOB and CP related to tightness. She is currently on 4L NC with resolved SOB and decreased Chest pain tightness. Denies fever, chills or N/V.       Objective     Last Recorded Vitals  /72 (BP Location: Left arm, Patient Position: Lying)   Pulse 67   Temp 36.7 °C (98.1 °F) (Temporal)   Resp 17   Wt 75.8 kg (167 lb 1.7 oz)   SpO2 98%   Intake/Output last 3 Shifts:    Intake/Output Summary (Last 24 hours) at 7/12/2025 1414  Last data filed at 7/12/2025 0000  Gross per 24 hour   Intake 1390 ml   Output 5 ml   Net 1385 ml       Admission Weight  Weight: 67.1 kg (148 lb) (07/11/25 1327)    Daily Weight  07/12/25 : 75.8 kg (167 lb 1.7 oz)    Image Results  IR CVC exchange  Narrative: Interpreted By:  Chris Lott,   STUDY:  IR CVC EXCHANGE; 7/3/2025 4:17 pm      INDICATION:  End-stage renal disease. Externalization of cuff of tunneled central  venous catheter in right groin.      COMPARISON:  None      ACCESSION NUMBER(S):  [id number]      ORDERING CLINICIAN:  SADIE BAUTISTA      TECHNIQUE:  Exchange of tunneled central venous catheter without port.  Fluoroscopy time 0.1 minutes; dose 0.9 mGy air kerma.      FINDINGS:  Informed consent obtained. Patient positioned supine and connected to  physiologic monitoring. Intravenous sedation provided by  anesthesiology. All elements of maximal sterile barrier were utilized  including cap, mask, sterile gown, sterile gloves, large sterile  drape, hand scrub, 2% chlorhexidine for cleaning the right  groin-upper thigh and indwelling catheter. Skin and subcutaneous  tract around insertion site anesthetized with lidocaine. Using blunt  dissection as needed, the indwelling catheter was removed over a  guidewire. A new 14.5 French x 44 cm double-lumen cuffed catheter  (Palindrome) was  advanced over the wire with tip positioned near  inferior cavoatrial junction. Both lumens aspirated and flushed  freely, locked with heparin. Catheter secured and covered with  dressing. Patient tolerated procedure well.      Impression: Exchange of tunneled central venous hemodialysis catheter. The  catheter is ready for use.          Signed by: Chris Lott 7/10/2025 11:07 AM  Dictation workstation:   [id number]      Physical Exam  Constitutional:       General: She is not in acute distress.     Appearance: She is ill-appearing. She is not diaphoretic.   HENT:      Head: Normocephalic and atraumatic.      Mouth/Throat:      Mouth: Mucous membranes are moist.      Pharynx: Oropharynx is clear.   Eyes:      Extraocular Movements: Extraocular movements intact.      Conjunctiva/sclera: Conjunctivae normal.   Cardiovascular:      Rate and Rhythm: Normal rate and regular rhythm.      Heart sounds: Normal heart sounds.   Pulmonary:      Effort: Pulmonary effort is normal.      Breath sounds: Normal breath sounds. No wheezing.   Chest:      Chest wall: No tenderness.   Abdominal:      General: Abdomen is flat. Bowel sounds are normal.      Palpations: Abdomen is soft.      Tenderness: There is no abdominal tenderness. There is no guarding.   Musculoskeletal:         General: Normal range of motion.      Cervical back: Normal range of motion and neck supple.   Skin:     General: Skin is warm.      Findings: No erythema or rash.      Comments: Right groin with cathter dialysis warm to touch, TTP. No bleeding, streaking, induration, drainage, or pus.    Dressing clean, dry and intact.   Neurological:      General: No focal deficit present.      Mental Status: She is alert. Mental status is at baseline.   Psychiatric:         Mood and Affect: Mood normal.         Behavior: Behavior normal.       Relevant Results  Lab Results   Component Value Date    GLUCOSE 82 07/12/2025    CALCIUM 7.1 (L) 07/12/2025     (L)  07/12/2025    K 5.1 07/12/2025    CO2 22 07/12/2025    CL 95 (L) 07/12/2025    BUN 46 (H) 07/12/2025    CREATININE 8.41 (H) 07/12/2025     Lab Results   Component Value Date    WBC 8.1 07/12/2025    HGB 9.1 (L) 07/12/2025    HCT 29.3 (L) 07/12/2025    MCV 99 07/12/2025     07/12/2025    IR CVC exchange  Result Date: 7/10/2025  Interpreted By:  Chris Lott, STUDY: IR CVC EXCHANGE; 7/3/2025 4:17 pm   INDICATION: End-stage renal disease. Externalization of cuff of tunneled central venous catheter in right groin.   COMPARISON: None   ACCESSION NUMBER(S): [id number]   ORDERING CLINICIAN: SADIE BAUTISTA   TECHNIQUE: Exchange of tunneled central venous catheter without port. Fluoroscopy time 0.1 minutes; dose 0.9 mGy air kerma.   FINDINGS: Informed consent obtained. Patient positioned supine and connected to physiologic monitoring. Intravenous sedation provided by anesthesiology. All elements of maximal sterile barrier were utilized including cap, mask, sterile gown, sterile gloves, large sterile drape, hand scrub, 2% chlorhexidine for cleaning the right groin-upper thigh and indwelling catheter. Skin and subcutaneous tract around insertion site anesthetized with lidocaine. Using blunt dissection as needed, the indwelling catheter was removed over a guidewire. A new 14.5 French x 44 cm double-lumen cuffed catheter (Palindrome) was advanced over the wire with tip positioned near inferior cavoatrial junction. Both lumens aspirated and flushed freely, locked with heparin. Catheter secured and covered with dressing. Patient tolerated procedure well.       Exchange of tunneled central venous hemodialysis catheter. The catheter is ready for use.     Signed by: Chris Lott 7/10/2025 11:07 AM Dictation workstation:   [id number]                           Assessment & Plan    Bacteremia  Blood cultures 7/10/2025 blood cultures grew yeast and Staphylococcus epidermidis  Will cover with broad-spectrum antibiotics and  antifungal  7/11/25 over wire removal of tunneled HD catheter  Follow cultures  Consult infectious disease, appreciate recommendations     Displaced hemodialysis catheter   Per IR note: 7/11/25 Over wire removal of tunneled HD catheter in right upper thigh. Sheath left in place to maintain access to be used for replacement of tunneled line once blood cultures clear      ESRD on hemodialysis  Renally dose meds, avoid nephrotoxic medications  Nephrology consulted, appreciate recommendations  Last HD 7/10/25     Hypertension  Continue antihypertensives  Monitor blood pressure close     Seizure  resumed home medication  seizure precaution     Coronary artery disease  Aspirin/statin     Anxiety/depression  resumed home medications         Plan  Monitor on telemetry  Monitor fluid/electrolytes close  Broad-spectrum antibiotics  Follow cultures  Consult ID and nephrology, appreciate recommendations  DVT prophylaxis  CBC and BMP in AM    Chiquita PLASENCIA       NP Attestation: I was present with the PA student who participated in the documentation of this note. I have personally seen and re-examined the patient and performed the medical decision-making components (assessment and plan of care). I have reviewed the PA student documentation and verified the findings in the note as written with additions or exceptions as stated in the body of this note.      Noelle Doss, APRN-CNP

## 2025-07-12 NOTE — PROGRESS NOTES
07/12/25 1702   Discharge Planning   Living Arrangements Spouse/significant other   Support Systems Spouse/significant other;Children;Family members   Assistance Needed none   Type of Residence Private residence   Number of Stairs to Enter Residence 4   Number of Stairs Within Residence 0   Do you have animals or pets at home? No   Who is requesting discharge planning? Provider   Home or Post Acute Services None   Expected Discharge Disposition Home   Does the patient need discharge transport arranged? No   Patient Choice   Provider Choice list and CMS website (https://medicare.gov/care-compare#search) for post-acute Quality and Resource Measure Data were provided and reviewed with: Other (Comment)  (no skilled needs)   Patient / Family choosing to utilize agency / facility established prior to hospitalization No   Stroke Family Assessment   Stroke Family Assessment Needed No

## 2025-07-12 NOTE — PROGRESS NOTES
07/12/25 1703   [facility] Disability Status   Are you deaf or do you have serious difficulty hearing? N   Are you blind or do you have serious difficulty seeing, even when wearing glasses? N   Because of a physical, mental, or emotional condition, do you have serious difficulty concentrating, remembering, or making decisions? (5 years old or older) N   Do you have serious difficulty walking or climbing stairs? N   Do you have serious difficulty dressing or bathing? N   Because of a physical, mental, or emotional condition, do you have serious difficulty doing errands alone such as visiting the doctor? N

## 2025-07-12 NOTE — PROGRESS NOTES
"Batsheva Page is a 49 y.o. female on day 1 of admission presenting with No Principal Problem: There is no principal problem currently on the Problem List. Please update the Problem List and refresh..    Subjective   Interval History:   Afebrile, no chills  On low-flow oxygen  No cough, chest pain  No nausea vomiting      Review of Systems   All other systems reviewed and are negative.      Objective   Range of Vitals (last 24 hours)  Heart Rate:  [57-96]   Temp:  [35.4 °C (95.7 °F)-37 °C (98.6 °F)]   Resp:  [14-22]   BP: (126-237)/()   Height:  [165.1 cm (5' 5\")]   Weight:  [67.1 kg (148 lb)-75.8 kg (167 lb 1.7 oz)]   SpO2:  [92 %-100 %]   Daily Weight  07/12/25 : 75.8 kg (167 lb 1.7 oz)    Body mass index is 27.81 kg/m².    Physical Exam  Constitutional:       Appearance: Normal appearance.   HENT:      Head: Normocephalic and atraumatic.      Right Ear: External ear normal.      Left Ear: External ear normal.      Nose: Nose normal.   Eyes:      General: No scleral icterus.     Extraocular Movements: Extraocular movements intact.      Conjunctiva/sclera: Conjunctivae normal.   Cardiovascular:      Rate and Rhythm: Normal rate and regular rhythm.      Heart sounds: Normal heart sounds, S1 normal and S2 normal.   Pulmonary:      Effort: Pulmonary effort is normal.      Breath sounds: Decreased breath sounds present.   Abdominal:      General: Bowel sounds are normal.      Palpations: Abdomen is soft.      Tenderness: There is no abdominal tenderness.   Musculoskeletal:      Cervical back: Normal range of motion and neck supple.      Right lower leg: No edema.      Left lower leg: No edema.   Skin:     General: Skin is warm and dry.   Neurological:      Mental Status: She is alert.   Psychiatric:         Behavior: Behavior normal. Behavior is cooperative.        Antibiotics  micafungin (Mycamine)  mg in 100 mL D5W (VBS)  vancomycin  VANCOMYCIN 5 MG/ML IN NS LOCK    Relevant Results  Labs  Results " from last 72 hours   Lab Units 07/12/25  0635   WBC AUTO x10*3/uL 8.1   HEMOGLOBIN g/dL 9.1*   HEMATOCRIT % 29.3*   PLATELETS AUTO x10*3/uL 183   NEUTROS PCT AUTO % 80.3   LYMPHS PCT AUTO % 10.0   MONOS PCT AUTO % 4.7   EOS PCT AUTO % 3.9     Results from last 72 hours   Lab Units 07/12/25  0635   SODIUM mmol/L 133*   POTASSIUM mmol/L 5.1   CHLORIDE mmol/L 95*   CO2 mmol/L 22   BUN mg/dL 46*   CREATININE mg/dL 8.41*   GLUCOSE mg/dL 82   CALCIUM mg/dL 7.1*   ANION GAP mmol/L 21*   EGFR mL/min/1.73m*2 5*     Results from last 72 hours   Lab Units 07/12/25  0635   ALK PHOS U/L 48   BILIRUBIN TOTAL mg/dL 0.6   PROTEIN TOTAL g/dL 6.9   ALT U/L 5*   AST U/L 11   ALBUMIN g/dL 3.3*     Estimated Creatinine Clearance: 8.1 mL/min (A) (by C-G formula based on SCr of 8.41 mg/dL (H)).  C-Reactive Protein   Date Value Ref Range Status   12/03/2024 27.39 (H) <1.00 mg/dL Final     CRP   Date Value Ref Range Status   12/25/2022 9.90 (A) mg/dL Final     Comment:     REF VALUE  < 1.00     12/23/2022 23.46 (A) mg/dL Final     Comment:     REF VALUE  < 1.00       Microbiology  Susceptibility data from last 14 days.  Collected Specimen Info Organism   07/10/25 Blood culture from Arterial Line Staphylococcus epidermidis     Candida (Nakaseomyces) glabrata       Imaging  IR CVC exchange  Result Date: 7/10/2025  Interpreted By:  Chris oLtt, STUDY: IR CVC EXCHANGE; 7/3/2025 4:17 pm   INDICATION: End-stage renal disease. Externalization of cuff of tunneled central venous catheter in right groin.   COMPARISON: None   ACCESSION NUMBER(S): [id number]   ORDERING CLINICIAN: SADIE BAUTISTA   TECHNIQUE: Exchange of tunneled central venous catheter without port. Fluoroscopy time 0.1 minutes; dose 0.9 mGy air kerma.   FINDINGS: Informed consent obtained. Patient positioned supine and connected to physiologic monitoring. Intravenous sedation provided by anesthesiology. All elements of maximal sterile barrier were utilized including cap, mask, sterile  gown, sterile gloves, large sterile drape, hand scrub, 2% chlorhexidine for cleaning the right groin-upper thigh and indwelling catheter. Skin and subcutaneous tract around insertion site anesthetized with lidocaine. Using blunt dissection as needed, the indwelling catheter was removed over a guidewire. A new 14.5 French x 44 cm double-lumen cuffed catheter (Palindrome) was advanced over the wire with tip positioned near inferior cavoatrial junction. Both lumens aspirated and flushed freely, locked with heparin. Catheter secured and covered with dressing. Patient tolerated procedure well.       Exchange of tunneled central venous hemodialysis catheter. The catheter is ready for use.     Signed by: Chris Lott 7/10/2025 11:07 AM Dictation workstation:   [id number]    CT abdomen pelvis wo IV contrast  Result Date: 6/15/2025  Interpreted By:  Melvin Williamson, STUDY: CT ABDOMEN PELVIS WO IV CONTRAST;  6/15/2025 1:10 pm   INDICATION: 48 y/o   F with  Signs/Symptoms:femoral dialysis line placement-pain.     LIMITATIONS: Evaluation of the solid organs is limited due to non use of IV contrast.   ACCESSION NUMBER(S): [id number]   ORDERING CLINICIAN: PAT BRYAN   TECHNIQUE: Thin-section noncontrast spiral axial images were obtained from the xiphoid down through the symphysis pubis. Sagittal and coronal reconstruction images were generated. Bone, mediastinal, lung, and liver windows were reviewed.   COMPARISON: Prior exam from 12/02/2024   FINDINGS: LUNG BASES: Small left pleural effusion with mild associated left basilar atelectasis. Small partially loculated lateral and posterior right pleural effusion with associated atelectasis. Punctate posterior right basilar calcified granuloma. Previous heart surgery.   LIVER: Mild hepatomegaly with the liver measuring 19.0 cm in length on the right, compared to 20.1 cm previously. Liver density was  within the limits of normal. No gross liver lesion in this unenhaced exam.    GALLBLADDER: No calcified stone, gallbladder wall thickening, or adjacent edema.   BILE DUCTS: No intrahepatic biliary ductal dilatation. Common bile duct was within the limits of normal.   SPLEEN: Mild splenomegaly with the spleen measuring 13.2 cm AP by 12.4 cm longitudinally, previously measuring 13.4 x 13.2 cm respectively. No gross splenic mass..   PANCREAS: Mild pancreatic atrophy. No pancreatic mass or inflammation, or ductal dilatation.   KIDNEYS/ADRENALS: No adrenal mass or enlargement. There is prominent bilateral native renal atrophy. There are multiple tiny calcifications in both kidneys which could be vascular and/or stones. No hydronephrosis. There is an exophytic posterior mid to upper pole right renal cyst measuring 12 mm, and a small anterior lower pole left renal cortical cyst. No suspicious mass in either kidney in this unenhanced exam. No ureteral stone or dilatation.   BLADDER/PELVIS: Urinary bladder was empty and collapsed.. No uterine enlargement. No adnexal lesion on the left. There is asymmetric hypodense fullness of the right ovary measuring 49 x 45 mm, compared to 49 x 44 mm when remeasured at the same level on the previous exam from 12/02/2024, grossly stable.   GREAT VESSELS/RETROPERITONEUM: Catheter in the IVC as described below. Mild mural calcifications in the abdominal aorta and iliac and femoral arteries. No abdominal aortic aneurysm. Multiple small stable periaortic retroperitoneal lymph nodes in the abdomen. No suspicious mesenteric adenopathy. No suspicious pelvic or inguinal adenopathy.   PERITONEUM: No ascites. No pneumoperitoneum. No peritoneal or mesenteric mass or inflammation.   BOWEL: The stomach was not well distended.. There was no small bowel dilatation or small bowel wall thickening. No small-bowel obstruction. There is diffuse retained colonic stool throughout the colon and rectum, least pronounced in the sigmoid and most pronounced from the cecum through the  transverse colon. Some of the stool is hyperdense, suggesting stasis. There was no colonic wall thickening or large bowel obstruction.  No edema adjacent to the colon. The cecal appendix was intact.   BONES: Bones are diffusely increased in density. No destructive lytic or blastic bone lesion.   ABDOMINAL WALL: Patient has a right femoral venous catheter extends cephalad all the way to the junction of the inferior vena cava and right atrium..       Prominent atrophy of the native kidneys. Please see above for details. Small bilateral renal cysts. No hydronephrosis. Urinary bladder was empty and collapsed.   Diffusely dense bones, most likely due to renal osteodystrophy.   Right femoral venous catheter that extends all the way to the junction of the right atrium with the inferior vena cava.   Splenomegaly, slightly improved.   Pancreatic atrophy.   Mild nonspecific periaortic retroperitoneal adenopathy.   Stable nonspecific asymmetric fullness of the right ovary. This could be due to stable postmenopausal cyst or stable neoplasm. Recommend further evaluation with pelvic ultrasound.   Diffuse retained colonic stool as described with findings of stool stasis. Currently without associated edema or obstruction.   Small bilateral pleural effusions with associated atelectasis, right greater than left. Mild fluid in the fissures.   MACRO: None   Signed by: Melvin Williamson 6/15/2025 3:05 PM Dictation workstation:   [id number]      Assessment/Plan   Fungemia-most likely line related-s/p removal of right thigh hemodialysis catheter replacement over guidewire  Positive blood culture for Staphylococcus epidermidis-contaminant versus bloodstream infection-patient has history of endocarditis     Continue vancomycin  Transthoracic echocardiogram  IV micafungin  Follow-up repeat blood cultures  Supportive care  Monitor temperature and WBC       This is a complex infectious disease issue and the following was performed today (for more  details please see the above note): Management decisions reflecting the added complexity (e.g., changes in antimicrobial therapy, infection control strategies).        Cordell Steinberg MD

## 2025-07-12 NOTE — SIGNIFICANT EVENT
Patient complains of shortness of breath and chest discomfort.  On exam, she is uncomfortable, in some respiratory distress.  Family members at bedside.  Patient is alert, cooperative with exam.  She is currently on Peysha cannula.  Lungs are diminished with few wheezes at bases.  Patient tells me that she is normally not on oxygen at home.  Heart: Regular S1-S2.  Monitor demonstrates sinus rhythm.  Abdomen is soft.  Extremities +2 edema.  EKG, reviewed: Normal sinus rhythm, no acute ischemic changes.  Impression and plan.  Sepsis, septicemia, fungemia.  Antibiotic therapy per ID.  S/p dialysis catheter removal.  Last dialysis was yesterday.  Patient is not making any urine.  I am worried that she might be developing some respiratory distress from becoming volume overloaded.  Will try topical nitroglycerin patch to help with preload and blood pressure control.

## 2025-07-12 NOTE — CONSULTS
Reason For Consult  ESRD on HD     History Of Present Illness  Batsheva Page is a 49 y.o. female   PMH; end-stage renal disease on hemodialysis, congestive heart failure, hypertension, history of multiple line infections, AVR, endocarditis, seizures   Patient presented to [facility] for dialysis catheter exchange.  Patient was recently admitted to this hospital.  During that admission she did have her dialysis catheter exchanged however continued to have difficulties with dialysis following discharge.  Blood cultures obtained on 7/10/2025 came back positive for gram-positive cocci in clusters, Staphylococcus epidermidis and yeast.       Patient underwent over wire removal of tunneled hemodialysis catheter in right upper thigh by Dr. Lott on 7/11/2025, sheath left in place to maintain access, to be used for replacement of tunneled line once blood cultures clear.      Nephrology was consulted for end-stage kidney disease.     Past Medical History  She has a past medical history of Aortic valve replaced (2022), CHF (congestive heart failure), Chronic pain, Coronary artery disease, Disease of thyroid gland, ESRD (end stage renal disease) (Multi), H/O mitral valve replacement (2013), Heart disease, History of transfusion, Hypertension, Mitral valve regurgitation, Seizures (Multi), and Stroke (Multi).    Surgical History  She has a past surgical history that includes IR CVC tunneled (09/09/2022); IR CVC tunneled (12/28/2022); US guided percutaneous placement (07/14/2022); Parathyroidectomy; Coronary artery bypass graft; Aortic valve replacement (09/26/2022); Mitral valve replacement (09/26/2022); Mitral valve repair (2013); Tricuspid valvuloplasty (2013); IR CVC (01/12/2024); IR CVC (05/07/2024); and IR CVC (08/20/2024).     Social History  She reports that she has never smoked. She has never used smokeless tobacco. She reports that she does not drink alcohol and does not use drugs.    Family History  Family  History[1]     Allergies  Compazine [prochlorperazine], Kayexalate, Reglan [metoclopramide hcl], and Zofran [ondansetron hcl]  Scheduled medications  Scheduled Medications[2]  Continuous medications  Continuous Medications[3]  PRN medications  PRN Medications[4]    Review of Systems  12 systems were reviewed and pertinent findings were addressed in HPI       Physical Exam  GENERAL: No distress.   SKIN: No rashes or lesions.  EYES: PERRLA, EOMI  HEENT: Head: Normocephalic  Neck: supple and no adenopathy  LUNGS: Lungs clear to auscultation.   CARDIAC: Normal S1 and S2; no murmurs.   ABDOMEN: Soft, non-tender.   EXTREMITIES: No ulcers, Extremities normal.   NEURO: No focal deficit.             I&O 24HR    Intake/Output Summary (Last 24 hours) at 7/12/2025 1948  Last data filed at 7/12/2025 1800  Gross per 24 hour   Intake 1170 ml   Output 0 ml   Net 1170 ml       Vitals 24HR  Heart Rate:  [58-67]   Temp:  [36 °C (96.8 °F)-37 °C (98.6 °F)]   Resp:  [17-22]   BP: (132-151)/(72-89)   Weight:  [75.8 kg (167 lb 1.7 oz)]   SpO2:  [93 %-98 %]     Relevant Results  Results for orders placed or performed during the hospital encounter of 07/11/25 (from the past 24 hours)   POCT GLUCOSE   Result Value Ref Range    POCT Glucose 100 (H) 74 - 99 mg/dL   PST Top   Result Value Ref Range    Extra Tube Hold for add-ons.    Lavender Top   Result Value Ref Range    Extra Tube Hold for add-ons.    CBC and Auto Differential   Result Value Ref Range    WBC 8.1 4.4 - 11.3 x10*3/uL    nRBC 0.0 0.0 - 0.0 /100 WBCs    RBC 2.95 (L) 4.00 - 5.20 x10*6/uL    Hemoglobin 9.1 (L) 12.0 - 16.0 g/dL    Hematocrit 29.3 (L) 36.0 - 46.0 %    MCV 99 80 - 100 fL    MCH 30.8 26.0 - 34.0 pg    MCHC 31.1 (L) 32.0 - 36.0 g/dL    RDW 15.9 (H) 11.5 - 14.5 %    Platelets 183 150 - 450 x10*3/uL    Neutrophils % 80.3 40.0 - 80.0 %    Immature Granulocytes %, Automated 0.5 0.0 - 0.9 %    Lymphocytes % 10.0 13.0 - 44.0 %    Monocytes % 4.7 2.0 - 10.0 %    Eosinophils %  3.9 0.0 - 6.0 %    Basophils % 0.6 0.0 - 2.0 %    Neutrophils Absolute 6.53 1.20 - 7.70 x10*3/uL    Immature Granulocytes Absolute, Automated 0.04 0.00 - 0.70 x10*3/uL    Lymphocytes Absolute 0.81 (L) 1.20 - 4.80 x10*3/uL    Monocytes Absolute 0.38 0.10 - 1.00 x10*3/uL    Eosinophils Absolute 0.32 0.00 - 0.70 x10*3/uL    Basophils Absolute 0.05 0.00 - 0.10 x10*3/uL   Comprehensive metabolic panel   Result Value Ref Range    Glucose 82 74 - 99 mg/dL    Sodium 133 (L) 136 - 145 mmol/L    Potassium 5.1 3.5 - 5.3 mmol/L    Chloride 95 (L) 98 - 107 mmol/L    Bicarbonate 22 21 - 32 mmol/L    Anion Gap 21 (H) 10 - 20 mmol/L    Urea Nitrogen 46 (H) 6 - 23 mg/dL    Creatinine 8.41 (H) 0.50 - 1.05 mg/dL    eGFR 5 (L) >60 mL/min/1.73m*2    Calcium 7.1 (L) 8.6 - 10.3 mg/dL    Albumin 3.3 (L) 3.4 - 5.0 g/dL    Alkaline Phosphatase 48 33 - 110 U/L    Total Protein 6.9 6.4 - 8.2 g/dL    AST 11 9 - 39 U/L    Bilirubin, Total 0.6 0.0 - 1.2 mg/dL    ALT 5 (L) 7 - 45 U/L   Protime-INR   Result Value Ref Range    Protime 12.6 9.3 - 12.7 seconds    INR 1.2 0.9 - 1.2   Blood Gas Lactic Acid, Venous   Result Value Ref Range    POCT Lactate, Venous 1.2 0.4 - 2.0 mmol/L   Magnesium   Result Value Ref Range    Magnesium 1.69 1.60 - 2.40 mg/dL   Vancomycin   Result Value Ref Range    Vancomycin 36.0 (H) 5.0 - 20.0 ug/mL          Assessment & Plan  ESRD (end stage renal disease) on dialysis (Multi)      Assessment:   End-stage renal disease on hemodialysis,   Dialysis line infection, staph epi bacteremia and fungemia.  Hyperkalemia  Hypertension  Anemia    PLAN;   The patient line was removed.  Will start Lokelma for the next 2 days.  Will need a dialysis line on Monday, likely temporary.  Dose antibiotics to end-stage kidney disease.  Blood pressure was significantly elevated on arrival but is much better now.    Jerrod Pickett MD         [1]   Family History  Problem Relation Name Age of Onset    No Known Problems Mother      No Known  Problems Father     [2] amLODIPine, 5 mg, oral, Daily  [Held by provider] aspirin, 81 mg, oral, Daily  atorvastatin, 40 mg, oral, Nightly  calcitriol, 0.5 mcg, oral, Daily  carvedilol, 12.5 mg, oral, BID  cloNIDine, 0.3 mg, oral, q8h KIMBERLY  [START ON 7/18/2025] ergocalciferol, 1.25 mg, oral, Every Friday  levETIRAcetam, 750 mg, oral, Nightly  lidocaine, 0.1 mL, subcutaneous, Once  lidocaine, 1 patch, transdermal, q24h  methocarbamol, 500 mg, oral, q8h KIMBERLY  micafungin, 100 mg, intravenous, q24h  pregabalin, 75 mg, oral, Daily  sodium chloride, 3 mL, nebulization, q6h while awake  [3]    [4] PRN medications: diphenhydrAMINE, hydrALAZINE, HYDROmorphone, labetaloL, oxyCODONE, oxygen, vancomycin

## 2025-07-12 NOTE — CONSULTS
Inpatient consult to Infectious Diseases  Consult performed by: Cordell Steinberg MD  Consult ordered by: Noelle Doss, ORI-CNP            Primary MD: Zhou Pedersen MD    Reason For Consult  Positive blood cultures    History Of Present Illness  Batsheva Page is a 49 y.o. female presenting with displaced dialysis catheter.  She has history of MSSA and MRSA catheter associated bloodstream infection.  She recently had positive right femoral wound culture for MRSA and was placed on Bactrim.  Previous blood cultures were reviewed about the last 10 years.  Positive blood culture for MRSA and 5/5/2022, 5/7/2022, and 5/9/2022, with negative blood culture on 5/12/2022  Positive blood culture on 7/10/2022, 7/14/2022, 7/15/2022  She also had positive blood culture on 8/23/2022, 9/1/2022, 9/3/2022, 9/5/2022  Result positive blood cultures on 6/20/2023, 6/22/2023  She also had positive blood cultures on 9/12/2023, 9/14/2023, 9/18/2023, 9/19/2023.  She has a positive blood culture 1/8/2024, 1/9/2024  She has a positive blood culture on 4/27/2024.  Also positive blood culture on 12/2/2024, 12/3/2024.  She also has history of endocarditis and seizures.  Blood cultures obtained on 7/20/2025 was positive for Staphylococcus epidermidis and yeast.  He underwent over-the-wire removal of tunneled hemodialysis catheter in right upper thigh.  She denies any fever or chills.  She denies any chest pain or shortness of breath  She denies any nausea vomiting or diarrhea.  She is on IV vancomycin and Zosyn    Past Medical History  She has a past medical history of Aortic valve replaced (2022), CHF (congestive heart failure), Chronic pain, Coronary artery disease, Disease of thyroid gland, ESRD (end stage renal disease) (Multi), H/O mitral valve replacement (2013), Heart disease, History of transfusion, Hypertension, Mitral valve regurgitation, Seizures (Multi), and Stroke (Multi).    Surgical History  She has a past surgical  "history that includes IR CVC tunneled (09/09/2022); IR CVC tunneled (12/28/2022); US guided percutaneous placement (07/14/2022); Parathyroidectomy; Coronary artery bypass graft; Aortic valve replacement (09/26/2022); Mitral valve replacement (09/26/2022); Mitral valve repair (2013); Tricuspid valvuloplasty (2013); IR CVC (01/12/2024); IR CVC (05/07/2024); and IR CVC (08/20/2024).     Social History     Occupational History    Not on file   Tobacco Use    Smoking status: Never    Smokeless tobacco: Never   Vaping Use    Vaping status: Never Used   Substance and Sexual Activity    Alcohol use: Never    Drug use: Never    Sexual activity: Not on file     Comment: post menopausal from dialysys     Travel History   Travel since 06/11/25    No documented travel since 06/11/25           Family History  Family History[1]  Allergies  Compazine [prochlorperazine], Kayexalate, Reglan [metoclopramide hcl], and Zofran [ondansetron hcl]       There is no immunization history on file for this patient.  Medications  Home medications:  Prescriptions Prior to Admission[2]  Current medications:  Scheduled medications  Scheduled Medications[3]  Continuous medications  Continuous Medications[4]  PRN medications  PRN Medications[5]    Review of Systems   All other systems reviewed and are negative.       Objective  Range of Vitals (last 24 hours)  Heart Rate:  [57-96]   Temp:  [35.4 °C (95.7 °F)-36.7 °C (98.1 °F)]   Resp:  [14-22]   BP: (126-230)/()   Height:  [165.1 cm (5' 5\")]   Weight:  [67.1 kg (148 lb)]   SpO2:  [93 %-100 %]   Daily Weight  07/11/25 : 67.1 kg (148 lb)    Body mass index is 24.63 kg/m².     Physical Exam  Constitutional:       Appearance: Normal appearance.   HENT:      Head: Normocephalic and atraumatic.      Right Ear: External ear normal.      Left Ear: External ear normal.      Nose: Nose normal.   Eyes:      General: No scleral icterus.     Extraocular Movements: Extraocular movements intact.      " "Conjunctiva/sclera: Conjunctivae normal.   Cardiovascular:      Rate and Rhythm: Normal rate and regular rhythm.      Heart sounds: Normal heart sounds, S1 normal and S2 normal.   Pulmonary:      Effort: Pulmonary effort is normal.      Breath sounds: Decreased breath sounds present.   Abdominal:      General: Bowel sounds are normal.      Palpations: Abdomen is soft.      Tenderness: There is no abdominal tenderness.   Musculoskeletal:      Cervical back: Normal range of motion and neck supple.      Right lower leg: No edema.      Left lower leg: No edema.   Skin:     General: Skin is warm and dry.   Neurological:      Mental Status: She is alert.   Psychiatric:         Behavior: Behavior normal. Behavior is cooperative.          Relevant Results  Outside Hospital Results    Labs              CrCl cannot be calculated (Patient's most recent lab result is older than the maximum 3 days allowed.).  C-Reactive Protein   Date Value Ref Range Status   12/03/2024 27.39 (H) <1.00 mg/dL Final     CRP   Date Value Ref Range Status   12/25/2022 9.90 (A) mg/dL Final     Comment:     REF VALUE  < 1.00     12/23/2022 23.46 (A) mg/dL Final     Comment:     REF VALUE  < 1.00       Sedimentation Rate   Date Value Ref Range Status   12/03/2024 48 (H) 0 - 20 mm/h Final   12/23/2022 96 (H) 0 - 20 mm/h Final     No results found for: \"HIV1X2\", \"HIVCONF\", \"BJCQGA0KC\"  Hepatitis C Ab   Date Value Ref Range Status   12/23/2022 NONREACTIVE NONREACTIVE Final     Comment:      Results from patients taking biotin supplements or receiving   high-dose biotin therapy should be interpreted with caution   due to possible interference with this test. Providers may    contact their local laboratory for further information.       Microbiology  Susceptibility data from last 90 days.  Collected Specimen Info Organism Clindamycin Erythromycin Oxacillin Tetracycline Trimethoprim/Sulfamethoxazole Vancomycin   07/10/25 Blood culture from Arterial Line " Staphylococcus epidermidis         06/09/25 Swab from Anterior Nares Methicillin Resistant Staphylococcus aureus (MRSA)         06/09/25 Tissue/Biopsy from Skin Lesion Methicillin Resistant Staphylococcus aureus (MRSA)  S  S  R  S  S  S   04/17/25 Swab from Anterior Nares Methicillin Susceptible Staphylococcus aureus (MSSA)             Imaging  IR CVC exchange  Result Date: 7/10/2025  Interpreted By:  Chris Lott, STUDY: IR CVC EXCHANGE; 7/3/2025 4:17 pm   INDICATION: End-stage renal disease. Externalization of cuff of tunneled central venous catheter in right groin.   COMPARISON: None   ACCESSION NUMBER(S): [id number]   ORDERING CLINICIAN: SADIE BAUTISTA   TECHNIQUE: Exchange of tunneled central venous catheter without port. Fluoroscopy time 0.1 minutes; dose 0.9 mGy air kerma.   FINDINGS: Informed consent obtained. Patient positioned supine and connected to physiologic monitoring. Intravenous sedation provided by anesthesiology. All elements of maximal sterile barrier were utilized including cap, mask, sterile gown, sterile gloves, large sterile drape, hand scrub, 2% chlorhexidine for cleaning the right groin-upper thigh and indwelling catheter. Skin and subcutaneous tract around insertion site anesthetized with lidocaine. Using blunt dissection as needed, the indwelling catheter was removed over a guidewire. A new 14.5 French x 44 cm double-lumen cuffed catheter (WellDocrome) was advanced over the wire with tip positioned near inferior cavoatrial junction. Both lumens aspirated and flushed freely, locked with heparin. Catheter secured and covered with dressing. Patient tolerated procedure well.       Exchange of tunneled central venous hemodialysis catheter. The catheter is ready for use.     Signed by: Chris Lott 7/10/2025 11:07 AM Dictation workstation:   [id number]    CT abdomen pelvis wo IV contrast  Result Date: 6/15/2025  Interpreted By:  Melvin Williamson, STUDY: CT ABDOMEN PELVIS WO IV CONTRAST;   6/15/2025 1:10 pm   INDICATION: 50 y/o   F with  Signs/Symptoms:femoral dialysis line placement-pain.     LIMITATIONS: Evaluation of the solid organs is limited due to non use of IV contrast.   ACCESSION NUMBER(S): [id number]   ORDERING CLINICIAN: PAT BRYAN   TECHNIQUE: Thin-section noncontrast spiral axial images were obtained from the xiphoid down through the symphysis pubis. Sagittal and coronal reconstruction images were generated. Bone, mediastinal, lung, and liver windows were reviewed.   COMPARISON: Prior exam from 12/02/2024   FINDINGS: LUNG BASES: Small left pleural effusion with mild associated left basilar atelectasis. Small partially loculated lateral and posterior right pleural effusion with associated atelectasis. Punctate posterior right basilar calcified granuloma. Previous heart surgery.   LIVER: Mild hepatomegaly with the liver measuring 19.0 cm in length on the right, compared to 20.1 cm previously. Liver density was  within the limits of normal. No gross liver lesion in this unenhaced exam.   GALLBLADDER: No calcified stone, gallbladder wall thickening, or adjacent edema.   BILE DUCTS: No intrahepatic biliary ductal dilatation. Common bile duct was within the limits of normal.   SPLEEN: Mild splenomegaly with the spleen measuring 13.2 cm AP by 12.4 cm longitudinally, previously measuring 13.4 x 13.2 cm respectively. No gross splenic mass..   PANCREAS: Mild pancreatic atrophy. No pancreatic mass or inflammation, or ductal dilatation.   KIDNEYS/ADRENALS: No adrenal mass or enlargement. There is prominent bilateral native renal atrophy. There are multiple tiny calcifications in both kidneys which could be vascular and/or stones. No hydronephrosis. There is an exophytic posterior mid to upper pole right renal cyst measuring 12 mm, and a small anterior lower pole left renal cortical cyst. No suspicious mass in either kidney in this unenhanced exam. No ureteral stone or dilatation.    BLADDER/PELVIS: Urinary bladder was empty and collapsed.. No uterine enlargement. No adnexal lesion on the left. There is asymmetric hypodense fullness of the right ovary measuring 49 x 45 mm, compared to 49 x 44 mm when remeasured at the same level on the previous exam from 12/02/2024, grossly stable.   GREAT VESSELS/RETROPERITONEUM: Catheter in the IVC as described below. Mild mural calcifications in the abdominal aorta and iliac and femoral arteries. No abdominal aortic aneurysm. Multiple small stable periaortic retroperitoneal lymph nodes in the abdomen. No suspicious mesenteric adenopathy. No suspicious pelvic or inguinal adenopathy.   PERITONEUM: No ascites. No pneumoperitoneum. No peritoneal or mesenteric mass or inflammation.   BOWEL: The stomach was not well distended.. There was no small bowel dilatation or small bowel wall thickening. No small-bowel obstruction. There is diffuse retained colonic stool throughout the colon and rectum, least pronounced in the sigmoid and most pronounced from the cecum through the transverse colon. Some of the stool is hyperdense, suggesting stasis. There was no colonic wall thickening or large bowel obstruction.  No edema adjacent to the colon. The cecal appendix was intact.   BONES: Bones are diffusely increased in density. No destructive lytic or blastic bone lesion.   ABDOMINAL WALL: Patient has a right femoral venous catheter extends cephalad all the way to the junction of the inferior vena cava and right atrium..       Prominent atrophy of the native kidneys. Please see above for details. Small bilateral renal cysts. No hydronephrosis. Urinary bladder was empty and collapsed.   Diffusely dense bones, most likely due to renal osteodystrophy.   Right femoral venous catheter that extends all the way to the junction of the right atrium with the inferior vena cava.   Splenomegaly, slightly improved.   Pancreatic atrophy.   Mild nonspecific periaortic retroperitoneal  adenopathy.   Stable nonspecific asymmetric fullness of the right ovary. This could be due to stable postmenopausal cyst or stable neoplasm. Recommend further evaluation with pelvic ultrasound.   Diffuse retained colonic stool as described with findings of stool stasis. Currently without associated edema or obstruction.   Small bilateral pleural effusions with associated atelectasis, right greater than left. Mild fluid in the fissures.   MACRO: None   Signed by: Melvin Williamson 6/15/2025 3:05 PM Dictation workstation:   [id number]     Assessment/Plan   Fungemia-most likely line related-s/p removal of right thigh hemodialysis catheter replacement over guidewire  Positive blood culture for Staphylococcus epidermidis-contaminant versus bloodstream infection-patient has history of endocarditis    Continue vancomycin  Transthoracic echocardiogram  IV micafungin  Repeat blood cultures in a.m.  Supportive care  Monitor temperature  Time spent reviewing chart evaluating patient and formulating management plan is 40 minutes    This is a complex infectious disease issue and the following was performed today (for more details please see the above note): Management decisions reflecting the added complexity (e.g., changes in antimicrobial therapy, infection control strategies).     Cordell Steinberg MD       [1]   Family History  Problem Relation Name Age of Onset    No Known Problems Mother      No Known Problems Father     [2]   Medications Prior to Admission   Medication Sig Dispense Refill Last Dose/Taking    acetaminophen (Tylenol) 325 mg tablet Take 2 tablets (650 mg) by mouth every 6 hours as needed for mild pain (1 - 3). 30 tablet 0 7/10/2025    amLODIPine (Norvasc) 5 mg tablet Take 1 tablet (5 mg) by mouth once daily. 30 tablet 1 7/10/2025    aspirin 81 mg EC tablet Take 1 tablet (81 mg) by mouth once daily. 30 tablet 2 7/10/2025 Morning    atorvastatin (Lipitor) 40 mg tablet Take 1 tablet (40 mg) by mouth once daily.    7/10/2025    calcitriol (Rocaltrol) 0.25 mcg capsule Take 2 capsules (0.5 mcg) by mouth once daily.   7/10/2025    carvedilol (Coreg) 12.5 mg tablet Take 1 tablet (12.5 mg) by mouth 2 times a day. 60 tablet 2 7/10/2025    cloNIDine (Catapres) 0.3 mg tablet Take 1 tablet (0.3 mg) by mouth every 8 hours. 90 tablet 2 7/11/2025    epoetin brandy-epbx (RETACRIT) 20,000 unit/mL solution Inject 6,440 Units under the skin 3 times a week. Do not start before January 17, 2024. 30 mL 2 7/10/2025    ergocalciferol (Vitamin D-2) 1.25 MG (03939 UT) capsule Take 1 capsule (1.25 mg) by mouth 1 (one) time per week. FRIDAY   7/10/2025    HYDROmorphone (Dilaudid) 2 mg tablet Take 1 tablet (2 mg) by mouth every 6 hours if needed for severe pain (7 - 10). 12 tablet 0 7/10/2025    levETIRAcetam (Keppra) 500 mg tablet Take 1.5 tablets (750 mg) by mouth once daily at bedtime.   7/10/2025    lidocaine 4 % patch Place 1 patch over 12 hours on the skin once daily. Remove & discard patch within 12 hours or as directed by MD. 30 patch 1 Past Month    methocarbamol (Robaxin) 500 mg tablet Take 1 tablet (500 mg) by mouth every 8 hours. 90 tablet 1 7/10/2025    pregabalin (Lyrica) 75 mg capsule Take 1 capsule (75 mg) by mouth once daily. (Patient taking differently: Take 1 capsule (75 mg) by mouth 2 times a day.) 30 capsule 1 7/10/2025    promethazine (Phenergan) 25 mg tablet Take 1 tablet (25 mg) by mouth once daily as needed.   Past Month    vancomycin 5 mg/mL in sodium chloride 0.9% solution 2 mL by intra-catheter route 3 (three) times a week.   7/10/2025    sevelamer carbonate (Renvela) 800 mg tablet Take 1 tablet (800 mg) by mouth 3 times daily (morning, midday, late afternoon). Swallow tablet whole; do not crush, break, or chew. (Patient not taking: Reported on 7/11/2025) 90 tablet 1 Not Taking   [3] [START ON 7/12/2025] amLODIPine, 5 mg, oral, Daily  [Held by provider] aspirin, 81 mg, oral, Daily  atorvastatin, 40 mg, oral,  Nightly  calcitriol, 0.5 mcg, oral, Daily  carvedilol, 12.5 mg, oral, BID  cloNIDine, 0.3 mg, oral, q8h KIMBERLY  [START ON 7/18/2025] ergocalciferol, 1.25 mg, oral, Every Friday  levETIRAcetam, 750 mg, oral, Nightly  lidocaine, 0.1 mL, subcutaneous, Once  lidocaine, 1 patch, transdermal, q24h  methocarbamol, 500 mg, oral, q8h KIMBERLY  micafungin, 100 mg, intravenous, q24h  pregabalin, 75 mg, oral, Daily  vancomycin, 1,750 mg, intravenous, Once    [4]    [5] PRN medications: diphenhydrAMINE, fentaNYL PF, fentaNYL PF, hydrALAZINE, HYDROmorphone, labetaloL, meperidine, oxyCODONE, oxygen, vancomycin

## 2025-07-12 NOTE — NURSING NOTE
Unable to draw from right groin sheath for labs.  Pt anxious and unable to lay flat.  Unable to find vein for venipuncture.  Dr LANETTE chew.  Notified at Bedside

## 2025-07-12 NOTE — CARE PLAN
Problem: Pain - Adult  Goal: Verbalizes/displays adequate comfort level or baseline comfort level  Outcome: Progressing     Problem: Safety - Adult  Goal: Free from fall injury  Outcome: Progressing     Problem: Discharge Planning  Goal: Discharge to home or other facility with appropriate resources  Outcome: Progressing     Problem: Chronic Conditions and Co-morbidities  Goal: Patient's chronic conditions and co-morbidity symptoms are monitored and maintained or improved  Outcome: Progressing     Problem: Nutrition  Goal: Nutrient intake appropriate for maintaining nutritional needs  Outcome: Progressing     Problem: Fall/Injury  Goal: Not fall by end of shift  Outcome: Progressing  Goal: Be free from injury by end of the shift  Outcome: Progressing  Goal: Verbalize understanding of personal risk factors for fall in the hospital  Outcome: Progressing  Goal: Verbalize understanding of risk factor reduction measures to prevent injury from fall in the home  Outcome: Progressing  Goal: Use assistive devices by end of the shift  Outcome: Progressing  Goal: Pace activities to prevent fatigue by end of the shift  Outcome: Progressing     Problem: Pain  Goal: Takes deep breaths with improved pain control throughout the shift  Outcome: Progressing  Goal: Turns in bed with improved pain control throughout the shift  Outcome: Progressing  Goal: Walks with improved pain control throughout the shift  Outcome: Progressing  Goal: Performs ADL's with improved pain control throughout shift  Outcome: Progressing  Goal: Participates in PT with improved pain control throughout the shift  Outcome: Progressing  Goal: Free from opioid side effects throughout the shift  Outcome: Progressing  Goal: Free from acute confusion related to pain meds throughout the shift  Outcome: Progressing     Problem: Skin  Goal: Decreased wound size/increased tissue granulation at next dressing change  Outcome: Progressing  Goal: Participates in  plan/prevention/treatment measures  Outcome: Progressing  Goal: Prevent/manage excess moisture  Outcome: Progressing  Goal: Prevent/minimize sheer/friction injuries  Outcome: Progressing  Goal: Promote/optimize nutrition  Outcome: Progressing  Goal: Promote skin healing  Outcome: Progressing  Flowsheets (Taken 7/12/2025 5531)  Promote skin healing:   Protective dressings over bony prominences   Assess skin/pad under line(s)/device(s)   Turn/reposition every 2 hours/use positioning/transfer devices   Ensure correct size (line/device) and apply per  instructions   Rotate device position/do not position patient on device

## 2025-07-12 NOTE — PROGRESS NOTES
Batsheva Page is a 49 y.o. female on day 1 of admission presenting with No Principal Problem: There is no principal problem currently on the Problem List. Please update the Problem List and refresh..    Patient lives with her boyfriend in a single level house. She occasionally uses a cane. She is independent with ADLs, iADLs, and daily tasks. She no longer drives, her boyfriend and daughter provide. PCP is Dr Zhou Pedersen.  ADOD 07/14/2025  [facility] scores: no therapy orders  No skilled needs identified  Plan is to dc home with no needs, family will transport.     Nerissa Goins RN

## 2025-07-13 LAB
ANION GAP SERPL CALCULATED.3IONS-SCNC: 23 MMOL/L (ref 10–20)
BACTERIA BLD AEROBE CULT: ABNORMAL
BACTERIA BLD CULT: ABNORMAL
BACTERIA BLD CULT: NORMAL
BACTERIA BLD CULT: NORMAL
BUN SERPL-MCNC: 50 MG/DL (ref 6–23)
CALCIUM SERPL-MCNC: 7.3 MG/DL (ref 8.6–10.3)
CHLORIDE SERPL-SCNC: 94 MMOL/L (ref 98–107)
CO2 SERPL-SCNC: 22 MMOL/L (ref 21–32)
CREAT SERPL-MCNC: 9.58 MG/DL (ref 0.5–1.05)
EGFRCR SERPLBLD CKD-EPI 2021: 5 ML/MIN/1.73M*2
ERYTHROCYTE [DISTWIDTH] IN BLOOD BY AUTOMATED COUNT: 16 % (ref 11.5–14.5)
GLUCOSE SERPL-MCNC: 97 MG/DL (ref 74–99)
GRAM STN SPEC: ABNORMAL
GRAM STN SPEC: ABNORMAL
HCT VFR BLD AUTO: 33.1 % (ref 36–46)
HGB BLD-MCNC: 10 G/DL (ref 12–16)
MCH RBC QN AUTO: 30.6 PG (ref 26–34)
MCHC RBC AUTO-ENTMCNC: 30.2 G/DL (ref 32–36)
MCV RBC AUTO: 101 FL (ref 80–100)
NRBC BLD-RTO: 0 /100 WBCS (ref 0–0)
PLATELET # BLD AUTO: 183 X10*3/UL (ref 150–450)
POTASSIUM SERPL-SCNC: 5 MMOL/L (ref 3.5–5.3)
RBC # BLD AUTO: 3.27 X10*6/UL (ref 4–5.2)
SODIUM SERPL-SCNC: 134 MMOL/L (ref 136–145)
VANCOMYCIN SERPL-MCNC: 32 UG/ML (ref 5–20)
WBC # BLD AUTO: 7 X10*3/UL (ref 4.4–11.3)

## 2025-07-13 PROCEDURE — 80202 ASSAY OF VANCOMYCIN: CPT | Performed by: NURSE PRACTITIONER

## 2025-07-13 PROCEDURE — 2500000004 HC RX 250 GENERAL PHARMACY W/ HCPCS (ALT 636 FOR OP/ED)

## 2025-07-13 PROCEDURE — 85027 COMPLETE CBC AUTOMATED: CPT | Performed by: NURSE PRACTITIONER

## 2025-07-13 PROCEDURE — 2500000001 HC RX 250 WO HCPCS SELF ADMINISTERED DRUGS (ALT 637 FOR MEDICARE OP): Performed by: NURSE PRACTITIONER

## 2025-07-13 PROCEDURE — 2500000005 HC RX 250 GENERAL PHARMACY W/O HCPCS: Performed by: NURSE PRACTITIONER

## 2025-07-13 PROCEDURE — 36415 COLL VENOUS BLD VENIPUNCTURE: CPT | Performed by: NURSE PRACTITIONER

## 2025-07-13 PROCEDURE — 99232 SBSQ HOSP IP/OBS MODERATE 35: CPT

## 2025-07-13 PROCEDURE — 99232 SBSQ HOSP IP/OBS MODERATE 35: CPT | Performed by: NURSE PRACTITIONER

## 2025-07-13 PROCEDURE — 2500000004 HC RX 250 GENERAL PHARMACY W/ HCPCS (ALT 636 FOR OP/ED): Performed by: INTERNAL MEDICINE

## 2025-07-13 PROCEDURE — 2060000001 HC INTERMEDIATE ICU ROOM DAILY

## 2025-07-13 PROCEDURE — 2500000004 HC RX 250 GENERAL PHARMACY W/ HCPCS (ALT 636 FOR OP/ED): Performed by: NURSE PRACTITIONER

## 2025-07-13 PROCEDURE — 80048 BASIC METABOLIC PNL TOTAL CA: CPT | Performed by: NURSE PRACTITIONER

## 2025-07-13 RX ORDER — IPRATROPIUM BROMIDE AND ALBUTEROL SULFATE 2.5; .5 MG/3ML; MG/3ML
3 SOLUTION RESPIRATORY (INHALATION)
Status: DISCONTINUED | OUTPATIENT
Start: 2025-07-13 | End: 2025-07-17 | Stop reason: HOSPADM

## 2025-07-13 RX ORDER — DIPHENHYDRAMINE HYDROCHLORIDE 50 MG/ML
50 INJECTION, SOLUTION INTRAMUSCULAR; INTRAVENOUS
Status: DISCONTINUED | OUTPATIENT
Start: 2025-07-13 | End: 2025-07-17

## 2025-07-13 RX ORDER — LORAZEPAM 0.5 MG/1
0.25 TABLET ORAL EVERY 6 HOURS PRN
Status: DISCONTINUED | OUTPATIENT
Start: 2025-07-13 | End: 2025-07-17

## 2025-07-13 RX ADMIN — METHOCARBAMOL 500 MG: 500 TABLET ORAL at 21:00

## 2025-07-13 RX ADMIN — CARVEDILOL 12.5 MG: 12.5 TABLET, FILM COATED ORAL at 20:24

## 2025-07-13 RX ADMIN — HYDROMORPHONE HYDROCHLORIDE 0.4 MG: 0.5 INJECTION, SOLUTION INTRAMUSCULAR; INTRAVENOUS; SUBCUTANEOUS at 04:39

## 2025-07-13 RX ADMIN — DIPHENHYDRAMINE HYDROCHLORIDE 50 MG: 50 INJECTION, SOLUTION INTRAMUSCULAR; INTRAVENOUS at 05:42

## 2025-07-13 RX ADMIN — DIPHENHYDRAMINE HYDROCHLORIDE 50 MG: 50 INJECTION, SOLUTION INTRAMUSCULAR; INTRAVENOUS at 20:23

## 2025-07-13 RX ADMIN — CLONIDINE HYDROCHLORIDE 0.3 MG: 0.2 TABLET ORAL at 13:13

## 2025-07-13 RX ADMIN — CLONIDINE HYDROCHLORIDE 0.3 MG: 0.2 TABLET ORAL at 21:00

## 2025-07-13 RX ADMIN — DIPHENHYDRAMINE HYDROCHLORIDE 50 MG: 50 INJECTION, SOLUTION INTRAMUSCULAR; INTRAVENOUS at 13:48

## 2025-07-13 RX ADMIN — HYDROMORPHONE HYDROCHLORIDE 0.4 MG: 0.5 INJECTION, SOLUTION INTRAMUSCULAR; INTRAVENOUS; SUBCUTANEOUS at 16:52

## 2025-07-13 RX ADMIN — AMLODIPINE BESYLATE 5 MG: 5 TABLET ORAL at 09:55

## 2025-07-13 RX ADMIN — METHOCARBAMOL 500 MG: 500 TABLET ORAL at 13:13

## 2025-07-13 RX ADMIN — METHOCARBAMOL 500 MG: 500 TABLET ORAL at 05:00

## 2025-07-13 RX ADMIN — HYDROMORPHONE HYDROCHLORIDE 0.4 MG: 0.5 INJECTION, SOLUTION INTRAMUSCULAR; INTRAVENOUS; SUBCUTANEOUS at 13:48

## 2025-07-13 RX ADMIN — CLONIDINE HYDROCHLORIDE 0.3 MG: 0.2 TABLET ORAL at 05:00

## 2025-07-13 RX ADMIN — HYDROMORPHONE HYDROCHLORIDE 0.4 MG: 0.5 INJECTION, SOLUTION INTRAMUSCULAR; INTRAVENOUS; SUBCUTANEOUS at 01:12

## 2025-07-13 RX ADMIN — DIPHENHYDRAMINE HYDROCHLORIDE 50 MG: 50 INJECTION, SOLUTION INTRAMUSCULAR; INTRAVENOUS at 01:15

## 2025-07-13 RX ADMIN — DIPHENHYDRAMINE HYDROCHLORIDE 50 MG: 50 INJECTION, SOLUTION INTRAMUSCULAR; INTRAVENOUS at 16:53

## 2025-07-13 RX ADMIN — CALCITRIOL CAPSULES 0.25 MCG 0.5 MCG: 0.25 CAPSULE ORAL at 09:56

## 2025-07-13 RX ADMIN — HYDROMORPHONE HYDROCHLORIDE 0.4 MG: 0.5 INJECTION, SOLUTION INTRAMUSCULAR; INTRAVENOUS; SUBCUTANEOUS at 20:23

## 2025-07-13 RX ADMIN — LIDOCAINE 1 PATCH: 4 PATCH TOPICAL at 20:00

## 2025-07-13 RX ADMIN — HYDROMORPHONE HYDROCHLORIDE 0.4 MG: 0.5 INJECTION, SOLUTION INTRAMUSCULAR; INTRAVENOUS; SUBCUTANEOUS at 09:52

## 2025-07-13 RX ADMIN — PREGABALIN 75 MG: 75 CAPSULE ORAL at 09:55

## 2025-07-13 RX ADMIN — LEVETIRACETAM 750 MG: 250 TABLET, FILM COATED ORAL at 20:26

## 2025-07-13 RX ADMIN — ATORVASTATIN CALCIUM 40 MG: 40 TABLET, FILM COATED ORAL at 20:26

## 2025-07-13 RX ADMIN — MICAFUNGIN SODIUM 100 MG: 100 INJECTION, POWDER, LYOPHILIZED, FOR SOLUTION INTRAVENOUS at 20:26

## 2025-07-13 RX ADMIN — CARVEDILOL 12.5 MG: 12.5 TABLET, FILM COATED ORAL at 09:56

## 2025-07-13 RX ADMIN — DIPHENHYDRAMINE HYDROCHLORIDE 50 MG: 50 INJECTION, SOLUTION INTRAMUSCULAR; INTRAVENOUS at 09:52

## 2025-07-13 ASSESSMENT — COGNITIVE AND FUNCTIONAL STATUS - GENERAL
DAILY ACTIVITIY SCORE: 24
MOVING TO AND FROM BED TO CHAIR: A LITTLE
STANDING UP FROM CHAIR USING ARMS: A LITTLE
CLIMB 3 TO 5 STEPS WITH RAILING: A LOT
MOBILITY SCORE: 18
WALKING IN HOSPITAL ROOM: A LOT

## 2025-07-13 ASSESSMENT — PAIN SCALES - GENERAL
PAINLEVEL_OUTOF10: 10 - WORST POSSIBLE PAIN
PAINLEVEL_OUTOF10: 10 - WORST POSSIBLE PAIN
PAINLEVEL_OUTOF10: 8
PAINLEVEL_OUTOF10: 3
PAINLEVEL_OUTOF10: 0 - NO PAIN
PAINLEVEL_OUTOF10: 3
PAINLEVEL_OUTOF10: 8
PAINLEVEL_OUTOF10: 8
PAINLEVEL_OUTOF10: 10 - WORST POSSIBLE PAIN
PAINLEVEL_OUTOF10: 3
PAINLEVEL_OUTOF10: 0 - NO PAIN

## 2025-07-13 ASSESSMENT — PAIN DESCRIPTION - ORIENTATION
ORIENTATION: RIGHT

## 2025-07-13 ASSESSMENT — PAIN DESCRIPTION - DESCRIPTORS
DESCRIPTORS: ACHING

## 2025-07-13 ASSESSMENT — PAIN DESCRIPTION - LOCATION
LOCATION: GROIN
LOCATION: LEG
LOCATION: GROIN
LOCATION: GROIN

## 2025-07-13 NOTE — PROGRESS NOTES
Batsheva Page is a 49 y.o. female on day 2 of admission presenting with No Principal Problem: There is no principal problem currently on the Problem List. Please update the Problem List and refresh..      Subjective   Patient is feeling well, no N/V, no CP or SOB.          Objective          Vitals 24HR  Heart Rate:  [55-65]   Temp:  [35.9 °C (96.6 °F)-36.6 °C (97.9 °F)]   Resp:  [16-17]   BP: (122-145)/(56-89)   Weight:  [79.8 kg (175 lb 14.8 oz)]   SpO2:  [93 %-100 %]     Intake/Output last 3 Shifts:    Intake/Output Summary (Last 24 hours) at 7/13/2025 1449  Last data filed at 7/13/2025 0600  Gross per 24 hour   Intake 360 ml   Output 0 ml   Net 360 ml       Physical Exam  GENERAL: No distress.   SKIN: No rashes or lesions.  EYES: PERRLA, EOMI  HEENT: Head: Normocephalic  Neck: supple and no adenopathy  LUNGS: Lungs clear to auscultation.   CARDIAC: Normal S1 and S2; no murmurs.   ABDOMEN: Soft, non-tender.   EXTREMITIES: No ulcers, Extremities normal.   NEURO: No focal deficit.     Relevant Results                  Scheduled medications  Scheduled Medications[1]  Continuous medications  Continuous Medications[2]  PRN medications  PRN Medications[3]  Results for orders placed or performed during the hospital encounter of 07/11/25 (from the past 24 hours)   Vancomycin   Result Value Ref Range    Vancomycin 32.0 (H) 5.0 - 20.0 ug/mL   CBC   Result Value Ref Range    WBC 7.0 4.4 - 11.3 x10*3/uL    nRBC 0.0 0.0 - 0.0 /100 WBCs    RBC 3.27 (L) 4.00 - 5.20 x10*6/uL    Hemoglobin 10.0 (L) 12.0 - 16.0 g/dL    Hematocrit 33.1 (L) 36.0 - 46.0 %     (H) 80 - 100 fL    MCH 30.6 26.0 - 34.0 pg    MCHC 30.2 (L) 32.0 - 36.0 g/dL    RDW 16.0 (H) 11.5 - 14.5 %    Platelets 183 150 - 450 x10*3/uL   Basic Metabolic Panel   Result Value Ref Range    Glucose 97 74 - 99 mg/dL    Sodium 134 (L) 136 - 145 mmol/L    Potassium 5.0 3.5 - 5.3 mmol/L    Chloride 94 (L) 98 - 107 mmol/L    Bicarbonate 22 21 - 32 mmol/L    Anion  Gap 23 (H) 10 - 20 mmol/L    Urea Nitrogen 50 (H) 6 - 23 mg/dL    Creatinine 9.58 (H) 0.50 - 1.05 mg/dL    eGFR 5 (L) >60 mL/min/1.73m*2    Calcium 7.3 (L) 8.6 - 10.3 mg/dL       Assessment & Plan  ESRD (end stage renal disease) on dialysis (Multi)    Palliative care encounter      49 y.o. female   PMH; end-stage renal disease on hemodialysis, congestive heart failure, hypertension, history of multiple line infections, AVR, endocarditis, seizures       End-stage renal disease on hemodialysis,   Dialysis line infection, staph epi bacteremia and fungemia.  Hyperkalemia  Hypertension  Anemia     PLAN;   The patient line was removed.  Patient refused Lokelma and Veltassa.  Will need a dialysis line on Monday, likely temporary until blood cultures are clear.  Dose antibiotics to end-stage kidney disease.  Blood pressure was significantly elevated on arrival but has been stable since then.      Jerrod Pickett MD           [1] amLODIPine, 5 mg, oral, Daily  [Held by provider] aspirin, 81 mg, oral, Daily  atorvastatin, 40 mg, oral, Nightly  calcitriol, 0.5 mcg, oral, Daily  carvedilol, 12.5 mg, oral, BID  cloNIDine, 0.3 mg, oral, q8h KIMBERLY  [START ON 7/18/2025] ergocalciferol, 1.25 mg, oral, Every Friday  ipratropium-albuteroL, 3 mL, nebulization, TID  levETIRAcetam, 750 mg, oral, Nightly  lidocaine, 0.1 mL, subcutaneous, Once  lidocaine, 1 patch, transdermal, q24h  methocarbamol, 500 mg, oral, q8h KIMBERLY  micafungin, 100 mg, intravenous, q24h  patiromer calcium sorbitex, 1 packet, oral, Daily  pregabalin, 75 mg, oral, Daily  sodium chloride, 3 mL, nebulization, q6h while awake  [2]    [3] PRN medications: diphenhydrAMINE, hydrALAZINE, HYDROmorphone, labetaloL, LORazepam, oxyCODONE, oxygen, vancomycin

## 2025-07-13 NOTE — CARE PLAN
The patient's goals for the shift include  manage electrolytes.    The clinical goals for the shift include Patient will remain HDS this shift    Over the shift, the patient did not make progress toward the following goals. Barriers to progression include HD patient. Recommendations to address these barriers include place HD cath, monitor PO intake/fluids.    Problem: Pain - Adult  Goal: Verbalizes/displays adequate comfort level or baseline comfort level  Outcome: Progressing     Problem: Safety - Adult  Goal: Free from fall injury  Outcome: Progressing     Problem: Discharge Planning  Goal: Discharge to home or other facility with appropriate resources  Outcome: Progressing     Problem: Chronic Conditions and Co-morbidities  Goal: Patient's chronic conditions and co-morbidity symptoms are monitored and maintained or improved  Outcome: Progressing     Problem: Nutrition  Goal: Nutrient intake appropriate for maintaining nutritional needs  Outcome: Progressing     Problem: Fall/Injury  Goal: Not fall by end of shift  Outcome: Progressing  Goal: Be free from injury by end of the shift  Outcome: Progressing  Goal: Verbalize understanding of personal risk factors for fall in the hospital  Outcome: Progressing  Goal: Verbalize understanding of risk factor reduction measures to prevent injury from fall in the home  Outcome: Progressing  Goal: Use assistive devices by end of the shift  Outcome: Progressing  Goal: Pace activities to prevent fatigue by end of the shift  Outcome: Progressing     Problem: Pain  Goal: Takes deep breaths with improved pain control throughout the shift  Outcome: Progressing  Goal: Turns in bed with improved pain control throughout the shift  Outcome: Progressing  Goal: Walks with improved pain control throughout the shift  Outcome: Progressing  Goal: Performs ADL's with improved pain control throughout shift  Outcome: Progressing  Goal: Participates in PT with improved pain control throughout  the shift  Outcome: Progressing  Goal: Free from opioid side effects throughout the shift  Outcome: Progressing  Goal: Free from acute confusion related to pain meds throughout the shift  Outcome: Progressing     Problem: Skin  Goal: Decreased wound size/increased tissue granulation at next dressing change  Outcome: Progressing  Goal: Participates in plan/prevention/treatment measures  Outcome: Progressing  Goal: Prevent/manage excess moisture  Outcome: Progressing  Goal: Prevent/minimize sheer/friction injuries  Outcome: Progressing  Goal: Promote/optimize nutrition  Outcome: Progressing  Goal: Promote skin healing  Outcome: Progressing

## 2025-07-13 NOTE — CARE PLAN
The patient's goals for the shift include      The clinical goals for the shift include Patient will remain HDS this shift      Problem: Pain - Adult  Goal: Verbalizes/displays adequate comfort level or baseline comfort level  Outcome: Progressing     Problem: Safety - Adult  Goal: Free from fall injury  Outcome: Progressing     Problem: Discharge Planning  Goal: Discharge to home or other facility with appropriate resources  Outcome: Progressing     Problem: Chronic Conditions and Co-morbidities  Goal: Patient's chronic conditions and co-morbidity symptoms are monitored and maintained or improved  Outcome: Progressing     Problem: Nutrition  Goal: Nutrient intake appropriate for maintaining nutritional needs  Outcome: Progressing     Problem: Fall/Injury  Goal: Not fall by end of shift  Outcome: Progressing  Goal: Be free from injury by end of the shift  Outcome: Progressing  Goal: Verbalize understanding of personal risk factors for fall in the hospital  Outcome: Progressing  Goal: Verbalize understanding of risk factor reduction measures to prevent injury from fall in the home  Outcome: Progressing  Goal: Use assistive devices by end of the shift  Outcome: Progressing  Goal: Pace activities to prevent fatigue by end of the shift  Outcome: Progressing     Problem: Pain  Goal: Takes deep breaths with improved pain control throughout the shift  Outcome: Progressing  Goal: Turns in bed with improved pain control throughout the shift  Outcome: Progressing  Goal: Walks with improved pain control throughout the shift  Outcome: Progressing  Goal: Performs ADL's with improved pain control throughout shift  Outcome: Progressing  Goal: Participates in PT with improved pain control throughout the shift  Outcome: Progressing  Goal: Free from opioid side effects throughout the shift  Outcome: Progressing  Goal: Free from acute confusion related to pain meds throughout the shift  Outcome: Progressing     Problem: Skin  Goal:  Decreased wound size/increased tissue granulation at next dressing change  Outcome: Progressing  Goal: Participates in plan/prevention/treatment measures  Outcome: Progressing  Goal: Prevent/manage excess moisture  Outcome: Progressing  Goal: Prevent/minimize sheer/friction injuries  Outcome: Progressing  Goal: Promote/optimize nutrition  Outcome: Progressing  Goal: Promote skin healing  Outcome: Progressing

## 2025-07-13 NOTE — PROGRESS NOTES
Vancomycin Dosing by Pharmacy- FOLLOW-UP (HEMODIALYSIS)    Batsheva Page is a 49 y.o. year old female who Pharmacy has been consulted for vancomycin dosing for line infection. Based on the patient's indication and renal status this patient will be dosed based on a pre-HD level of 20-25 mcg/mL.     Patient is currently on hemodialysis User Schedule (TBD). Needs new dialysis line placed, likely on Monday 7/14 per nephrology note. Last dialysis was 7/10.    Current vancomycin regimen or maintenance dose: 750 mg after each dialysis session - to be ordered once new line is placed starting Monday 7/14         Lab Results   Component Value Date    VANCORANDOM 32.0 (H) 07/13/2025    VANCOTROUGH 27.9 (HH) 09/26/2022       Visit Vitals  /75 (BP Location: Left arm, Patient Position: Lying)   Pulse 57   Temp 35.9 °C (96.6 °F) (Temporal)   Resp 16        Lab Results   Component Value Date    CREATININE 9.58 (H) 07/13/2025    CREATININE 8.41 (H) 07/12/2025    CREATININE 5.67 (H) 06/18/2025    CREATININE 4.48 (H) 06/17/2025       I/O last 3 completed shifts:  In: 1170 (14.7 mL/kg) [P.O.:720; IV Piggyback:450]  Out: 0 (0 mL/kg)   Weight: 79.8 kg     Assessment/Plan     Continue current regimen. No dialysis today, needs new line placed on Monday 7/14.  Next pre-HD level will be obtained on 7/16 at 0500 prior to 2nd HD session. May be obtained sooner if clinically indicated.    Will continue to monitor renal function daily while on vancomycin and order serum creatinine at least every 48 hours if not already ordered.  Follow for continued vancomycin needs, clinical response, and signs/symptoms of toxicity.     Jesenia Lee, PharmD

## 2025-07-13 NOTE — ASSESSMENT & PLAN NOTE
Bacteremia  With fungemia on Micafungin  Blood cultures with Staph Epidermidis and Candida   Repeat blood cultures NGTD  Hx of Endocarditis TTE Pending   ID following     Hemodialysis cath infection  S/p removal of R fem cath and guidewire placement for patency     ESRD on hemodialysis  Plans for dialysis once a site is obtained hopefully Monday  Last Dialysis was 7/10/2025    Pain   -Per PDMP the patient is prescribed Lyrica 75mg daily and Percocet 5-325mg TID.   -Primary team dosing her pain medication in the hospital as   Dilaudid 0.4mg IV q3hrs as needed  Oxycodone IR 5mg q4hrs as needed     RLS  Continue Lyrica dose 75mg daily  Anxiety  Benadryl IV q4h as needed for pruritus and anxiety - is effective     Palliative Care   DNR CCA DNI   The patient has decision-making capacity  Daughter Xiao is DPOA-HC, document in EMR     7/13/2025  The patient remains on O2 via NC, states that her congestion is better.  He also endorses her pain is well-controlled with the current regimen as well as her pruritus and anxiety.  She states that she is hoping for dialysis tomorrow to help with fluid overload.  There are no changes today from a palliative care perspective.  We will continue to follow.    7/12/2025  Discussion with the patient regarding goals of care.  We did discuss former chrome status, the patient stated that she would like to continue the CODE STATUS of DNR CCA DNI.  We also discussed dialysis options.  She stated that she still plans to consult with a PD nephrologist for consideration for PD access outpatient.  The plan is to continue her treatment medical care.  As far as pain management updates patient states her pain is controlled on her current regimen.  She does state that her anxiety could be managed better is asking for an increase in dose of Benadryl.  I did increase from 25 mg every 3 hours to 50 mg every 4 hours.  Also with complaints of chest congestion.  I did order sodium chloride nebulizer.   Will monitor for effectiveness.

## 2025-07-13 NOTE — PROGRESS NOTES
Batsheva Page is a 49 y.o. female on day 2 of admission presenting with No Principal Problem: There is no principal problem currently on the Problem List. Please update the Problem List and refresh..    Subjective   Interval History:   Afebrile, no chills  No cough, chest pain or shortness of breath  No nausea vomiting or diarrhea    Review of Systems   All other systems reviewed and are negative.      Objective   Range of Vitals (last 24 hours)  Heart Rate:  [56-67]   Temp:  [36 °C (96.8 °F)-36.6 °C (97.9 °F)]   Resp:  [17]   BP: (123-145)/(69-89)   Weight:  [79.8 kg (175 lb 14.8 oz)]   SpO2:  [95 %-100 %]   Daily Weight  07/13/25 : 79.8 kg (175 lb 14.8 oz)    Body mass index is 29.28 kg/m².    Physical Exam  Constitutional:       Appearance: Normal appearance.   HENT:      Head: Normocephalic and atraumatic.      Right Ear: External ear normal.      Left Ear: External ear normal.      Nose: Nose normal.   Eyes:      General: No scleral icterus.     Extraocular Movements: Extraocular movements intact.      Conjunctiva/sclera: Conjunctivae normal.   Cardiovascular:      Rate and Rhythm: Normal rate and regular rhythm.      Heart sounds: Normal heart sounds, S1 normal and S2 normal.   Pulmonary:      Effort: Pulmonary effort is normal.      Breath sounds: Decreased breath sounds present.   Abdominal:      General: Bowel sounds are normal.      Palpations: Abdomen is soft.      Tenderness: There is no abdominal tenderness.   Musculoskeletal:      Cervical back: Normal range of motion and neck supple.      Right lower leg: No edema.      Left lower leg: No edema.   Skin:     General: Skin is warm and dry.   Neurological:      Mental Status: She is alert.   Psychiatric:         Behavior: Behavior normal. Behavior is cooperative.        Antibiotics  micafungin (Mycamine)  mg in 100 mL D5W (VBS)  vancomycin  VANCOMYCIN 5 MG/ML IN NS LOCK    Relevant Results  Labs  Results from last 72 hours   Lab Units  07/13/25  0541 07/12/25  0635   WBC AUTO x10*3/uL 7.0 8.1   HEMOGLOBIN g/dL 10.0* 9.1*   HEMATOCRIT % 33.1* 29.3*   PLATELETS AUTO x10*3/uL 183 183   NEUTROS PCT AUTO %  --  80.3   LYMPHS PCT AUTO %  --  10.0   MONOS PCT AUTO %  --  4.7   EOS PCT AUTO %  --  3.9     Results from last 72 hours   Lab Units 07/13/25  0541 07/12/25  0635   SODIUM mmol/L 134* 133*   POTASSIUM mmol/L 5.0 5.1   CHLORIDE mmol/L 94* 95*   CO2 mmol/L 22 22   BUN mg/dL 50* 46*   CREATININE mg/dL 9.58* 8.41*   GLUCOSE mg/dL 97 82   CALCIUM mg/dL 7.3* 7.1*   ANION GAP mmol/L 23* 21*   EGFR mL/min/1.73m*2 5* 5*     Results from last 72 hours   Lab Units 07/12/25  0635   ALK PHOS U/L 48   BILIRUBIN TOTAL mg/dL 0.6   PROTEIN TOTAL g/dL 6.9   ALT U/L 5*   AST U/L 11   ALBUMIN g/dL 3.3*     Estimated Creatinine Clearance: 7.3 mL/min (A) (by C-G formula based on SCr of 9.58 mg/dL (H)).  C-Reactive Protein   Date Value Ref Range Status   12/03/2024 27.39 (H) <1.00 mg/dL Final     CRP   Date Value Ref Range Status   12/25/2022 9.90 (A) mg/dL Final     Comment:     REF VALUE  < 1.00     12/23/2022 23.46 (A) mg/dL Final     Comment:     REF VALUE  < 1.00       Microbiology  Susceptibility data from last 14 days.  Collected Specimen Info Organism   07/10/25 Blood culture from Arterial Line Staphylococcus epidermidis     Candida (Nakaseomyces) glabrata   07/10/25 Blood culture from Arterial Line Candida (Nakaseomyces) glabrata     Imaging  IR CVC exchange  Result Date: 7/10/2025  Interpreted By:  Chris Lott, STUDY: IR CVC EXCHANGE; 7/3/2025 4:17 pm   INDICATION: End-stage renal disease. Externalization of cuff of tunneled central venous catheter in right groin.   COMPARISON: None   ACCESSION NUMBER(S): [id number]   ORDERING CLINICIAN: SADIE BAUTISTA   TECHNIQUE: Exchange of tunneled central venous catheter without port. Fluoroscopy time 0.1 minutes; dose 0.9 mGy air kerma.   FINDINGS: Informed consent obtained. Patient positioned supine and connected to  physiologic monitoring. Intravenous sedation provided by anesthesiology. All elements of maximal sterile barrier were utilized including cap, mask, sterile gown, sterile gloves, large sterile drape, hand scrub, 2% chlorhexidine for cleaning the right groin-upper thigh and indwelling catheter. Skin and subcutaneous tract around insertion site anesthetized with lidocaine. Using blunt dissection as needed, the indwelling catheter was removed over a guidewire. A new 14.5 French x 44 cm double-lumen cuffed catheter (Palindrome) was advanced over the wire with tip positioned near inferior cavoatrial junction. Both lumens aspirated and flushed freely, locked with heparin. Catheter secured and covered with dressing. Patient tolerated procedure well.       Exchange of tunneled central venous hemodialysis catheter. The catheter is ready for use.     Signed by: Chris Lott 7/10/2025 11:07 AM Dictation workstation:   [id number]    CT abdomen pelvis wo IV contrast  Result Date: 6/15/2025  Interpreted By:  Melvin Williamson, STUDY: CT ABDOMEN PELVIS WO IV CONTRAST;  6/15/2025 1:10 pm   INDICATION: 50 y/o   F with  Signs/Symptoms:femoral dialysis line placement-pain.     LIMITATIONS: Evaluation of the solid organs is limited due to non use of IV contrast.   ACCESSION NUMBER(S): [id number]   ORDERING CLINICIAN: PAT BRYAN   TECHNIQUE: Thin-section noncontrast spiral axial images were obtained from the xiphoid down through the symphysis pubis. Sagittal and coronal reconstruction images were generated. Bone, mediastinal, lung, and liver windows were reviewed.   COMPARISON: Prior exam from 12/02/2024   FINDINGS: LUNG BASES: Small left pleural effusion with mild associated left basilar atelectasis. Small partially loculated lateral and posterior right pleural effusion with associated atelectasis. Punctate posterior right basilar calcified granuloma. Previous heart surgery.   LIVER: Mild hepatomegaly with the liver measuring 19.0  cm in length on the right, compared to 20.1 cm previously. Liver density was  within the limits of normal. No gross liver lesion in this unenhaced exam.   GALLBLADDER: No calcified stone, gallbladder wall thickening, or adjacent edema.   BILE DUCTS: No intrahepatic biliary ductal dilatation. Common bile duct was within the limits of normal.   SPLEEN: Mild splenomegaly with the spleen measuring 13.2 cm AP by 12.4 cm longitudinally, previously measuring 13.4 x 13.2 cm respectively. No gross splenic mass..   PANCREAS: Mild pancreatic atrophy. No pancreatic mass or inflammation, or ductal dilatation.   KIDNEYS/ADRENALS: No adrenal mass or enlargement. There is prominent bilateral native renal atrophy. There are multiple tiny calcifications in both kidneys which could be vascular and/or stones. No hydronephrosis. There is an exophytic posterior mid to upper pole right renal cyst measuring 12 mm, and a small anterior lower pole left renal cortical cyst. No suspicious mass in either kidney in this unenhanced exam. No ureteral stone or dilatation.   BLADDER/PELVIS: Urinary bladder was empty and collapsed.. No uterine enlargement. No adnexal lesion on the left. There is asymmetric hypodense fullness of the right ovary measuring 49 x 45 mm, compared to 49 x 44 mm when remeasured at the same level on the previous exam from 12/02/2024, grossly stable.   GREAT VESSELS/RETROPERITONEUM: Catheter in the IVC as described below. Mild mural calcifications in the abdominal aorta and iliac and femoral arteries. No abdominal aortic aneurysm. Multiple small stable periaortic retroperitoneal lymph nodes in the abdomen. No suspicious mesenteric adenopathy. No suspicious pelvic or inguinal adenopathy.   PERITONEUM: No ascites. No pneumoperitoneum. No peritoneal or mesenteric mass or inflammation.   BOWEL: The stomach was not well distended.. There was no small bowel dilatation or small bowel wall thickening. No small-bowel obstruction. There  is diffuse retained colonic stool throughout the colon and rectum, least pronounced in the sigmoid and most pronounced from the cecum through the transverse colon. Some of the stool is hyperdense, suggesting stasis. There was no colonic wall thickening or large bowel obstruction.  No edema adjacent to the colon. The cecal appendix was intact.   BONES: Bones are diffusely increased in density. No destructive lytic or blastic bone lesion.   ABDOMINAL WALL: Patient has a right femoral venous catheter extends cephalad all the way to the junction of the inferior vena cava and right atrium..       Prominent atrophy of the native kidneys. Please see above for details. Small bilateral renal cysts. No hydronephrosis. Urinary bladder was empty and collapsed.   Diffusely dense bones, most likely due to renal osteodystrophy.   Right femoral venous catheter that extends all the way to the junction of the right atrium with the inferior vena cava.   Splenomegaly, slightly improved.   Pancreatic atrophy.   Mild nonspecific periaortic retroperitoneal adenopathy.   Stable nonspecific asymmetric fullness of the right ovary. This could be due to stable postmenopausal cyst or stable neoplasm. Recommend further evaluation with pelvic ultrasound.   Diffuse retained colonic stool as described with findings of stool stasis. Currently without associated edema or obstruction.   Small bilateral pleural effusions with associated atelectasis, right greater than left. Mild fluid in the fissures.   MACRO: None   Signed by: Melvin Williamson 6/15/2025 3:05 PM Dictation workstation:   [id number]     Assessment/Plan   Fungemia-most likely line related-s/p removal of right thigh hemodialysis catheter replacement over guidewire  Positive blood culture for Staphylococcus epidermidis-contaminant versus bloodstream infection-patient has history of endocarditis     Continue vancomycin  Transthoracic echocardiogram  IV micafungin  Follow-up repeat blood  cultures-7/12/2025  Supportive care  Monitor temperature and WBC  Long-term plan is 14 days of antibiotic therapy from negative blood culture  Ideally, patient should have ophthalmologic evaluation to rule out endophthalmitis          This is a complex infectious disease issue and the following was performed today (for more details please see the above note): Management decisions reflecting the added complexity (e.g., changes in antimicrobial therapy, infection control strategies).     Cordell Steinberg MD

## 2025-07-13 NOTE — PROGRESS NOTES
"Batsheva Page is a 49 y.o. female on day 2 of admission presenting with No Principal Problem: There is no principal problem currently on the Problem List. Please update the Problem List and refresh..    Subjective   Patient was seen and examined.  States her chest and gesturing is much better.  Also states her pain is well-controlled with the current regimen.  Lying in bed resting at this time.  Appears to be comfortable.       Objective     Physical Exam  Vitals and nursing note reviewed.   Constitutional:       Appearance: She is ill-appearing.   HENT:      Head: Normocephalic and atraumatic.      Mouth/Throat:      Mouth: Mucous membranes are moist.   Eyes:      Pupils: Pupils are equal, round, and reactive to light.   Cardiovascular:      Rate and Rhythm: Normal rate and regular rhythm.   Pulmonary:      Effort: No respiratory distress.      Comments: O2 via NC   Abdominal:      General: Bowel sounds are normal.   Musculoskeletal:         General: Swelling present.      Cervical back: Neck supple.      Comments: Face, R hand, and R leg swelling   Skin:     General: Skin is dry.      Capillary Refill: Capillary refill takes 2 to 3 seconds.   Neurological:      Mental Status: She is oriented to person, place, and time.     Last Recorded Vitals  Blood pressure 130/75, pulse 55, temperature 35.9 °C (96.6 °F), temperature source Temporal, resp. rate 16, height 1.651 m (5' 5\"), weight 79.8 kg (175 lb 14.8 oz), SpO2 98%.  Intake/Output last 3 Shifts:  I/O last 3 completed shifts:  In: 1170 (14.7 mL/kg) [P.O.:720; IV Piggyback:450]  Out: 0 (0 mL/kg)   Weight: 79.8 kg     Relevant Results  Results for orders placed or performed during the hospital encounter of 07/11/25 (from the past 24 hours)   Vancomycin   Result Value Ref Range    Vancomycin 32.0 (H) 5.0 - 20.0 ug/mL   CBC   Result Value Ref Range    WBC 7.0 4.4 - 11.3 x10*3/uL    nRBC 0.0 0.0 - 0.0 /100 WBCs    RBC 3.27 (L) 4.00 - 5.20 x10*6/uL    Hemoglobin " 10.0 (L) 12.0 - 16.0 g/dL    Hematocrit 33.1 (L) 36.0 - 46.0 %     (H) 80 - 100 fL    MCH 30.6 26.0 - 34.0 pg    MCHC 30.2 (L) 32.0 - 36.0 g/dL    RDW 16.0 (H) 11.5 - 14.5 %    Platelets 183 150 - 450 x10*3/uL   Basic Metabolic Panel   Result Value Ref Range    Glucose 97 74 - 99 mg/dL    Sodium 134 (L) 136 - 145 mmol/L    Potassium 5.0 3.5 - 5.3 mmol/L    Chloride 94 (L) 98 - 107 mmol/L    Bicarbonate 22 21 - 32 mmol/L    Anion Gap 23 (H) 10 - 20 mmol/L    Urea Nitrogen 50 (H) 6 - 23 mg/dL    Creatinine 9.58 (H) 0.50 - 1.05 mg/dL    eGFR 5 (L) >60 mL/min/1.73m*2    Calcium 7.3 (L) 8.6 - 10.3 mg/dL      IR CVC exchange  Result Date: 7/10/2025  Interpreted By:  Chris Lott, STUDY: IR CVC EXCHANGE; 7/3/2025 4:17 pm   INDICATION: End-stage renal disease. Externalization of cuff of tunneled central venous catheter in right groin.   COMPARISON: None   ACCESSION NUMBER(S): [id number]   ORDERING CLINICIAN: SADIE BAUTISTA   TECHNIQUE: Exchange of tunneled central venous catheter without port. Fluoroscopy time 0.1 minutes; dose 0.9 mGy air kerma.   FINDINGS: Informed consent obtained. Patient positioned supine and connected to physiologic monitoring. Intravenous sedation provided by anesthesiology. All elements of maximal sterile barrier were utilized including cap, mask, sterile gown, sterile gloves, large sterile drape, hand scrub, 2% chlorhexidine for cleaning the right groin-upper thigh and indwelling catheter. Skin and subcutaneous tract around insertion site anesthetized with lidocaine. Using blunt dissection as needed, the indwelling catheter was removed over a guidewire. A new 14.5 French x 44 cm double-lumen cuffed catheter (Palindrome) was advanced over the wire with tip positioned near inferior cavoatrial junction. Both lumens aspirated and flushed freely, locked with heparin. Catheter secured and covered with dressing. Patient tolerated procedure well.       Exchange of tunneled central venous  hemodialysis catheter. The catheter is ready for use.     Signed by: Chris Lott 7/10/2025 11:07 AM Dictation workstation:   [id number]       Scheduled medications  Scheduled Medications[1]  Continuous medications  Continuous Medications[2]  PRN medications  PRN Medications[3]     Assessment & Plan  Palliative care encounter  Bacteremia  With fungemia on Micafungin  Blood cultures with Staph Epidermidis and Candida   Repeat blood cultures NGTD  Hx of Endocarditis TTE Pending   ID following     Hemodialysis cath infection  S/p removal of R fem cath and guidewire placement for patency     ESRD on hemodialysis  Plans for dialysis once a site is obtained hopefully Monday  Last Dialysis was 7/10/2025    Pain   -Per PDMP the patient is prescribed Lyrica 75mg daily and Percocet 5-325mg TID.   -Primary team dosing her pain medication in the hospital as   Dilaudid 0.4mg IV q3hrs as needed  Oxycodone IR 5mg q4hrs as needed     RLS  Continue Lyrica dose 75mg daily  Anxiety  Benadryl IV q4h as needed for pruritus and anxiety - is effective     Palliative Care   DNR CCA DNI   The patient has decision-making capacity  Daughter Xiao is DPOA-HC, document in EMR     7/13/2025  The patient remains on O2 via NC, states that her congestion is better.  He also endorses her pain is well-controlled with the current regimen as well as her pruritus and anxiety.  She states that she is hoping for dialysis tomorrow to help with fluid overload.  There are no changes today from a palliative care perspective.  We will continue to follow.    7/12/2025  Discussion with the patient regarding goals of care.  We did discuss former chrome status, the patient stated that she would like to continue the CODE STATUS of DNR CCA DNI.  We also discussed dialysis options.  She stated that she still plans to consult with a PD nephrologist for consideration for PD access outpatient.  The plan is to continue her treatment medical care.  As far as pain  management updates patient states her pain is controlled on her current regimen.  She does state that her anxiety could be managed better is asking for an increase in dose of Benadryl.  I did increase from 25 mg every 3 hours to 50 mg every 4 hours.  Also with complaints of chest congestion.  I did order sodium chloride nebulizer.  Will monitor for effectiveness.     I spent 30 minutes in the professional and overall care of this patient.      Vannesa He, ORI-CNP         [1] amLODIPine, 5 mg, oral, Daily  [Held by provider] aspirin, 81 mg, oral, Daily  atorvastatin, 40 mg, oral, Nightly  calcitriol, 0.5 mcg, oral, Daily  carvedilol, 12.5 mg, oral, BID  cloNIDine, 0.3 mg, oral, q8h KIMBERLY  [START ON 7/18/2025] ergocalciferol, 1.25 mg, oral, Every Friday  ipratropium-albuteroL, 3 mL, nebulization, TID  levETIRAcetam, 750 mg, oral, Nightly  lidocaine, 0.1 mL, subcutaneous, Once  lidocaine, 1 patch, transdermal, q24h  methocarbamol, 500 mg, oral, q8h KIMBERLY  micafungin, 100 mg, intravenous, q24h  patiromer calcium sorbitex, 1 packet, oral, Daily  pregabalin, 75 mg, oral, Daily  sodium chloride, 3 mL, nebulization, q6h while awake  [2]    [3] PRN medications: diphenhydrAMINE, hydrALAZINE, HYDROmorphone, labetaloL, oxyCODONE, oxygen, vancomycin

## 2025-07-13 NOTE — PROGRESS NOTES
Batsheva Page is a 49 y.o. female on day 2 of admission presenting with Dialysis catheter complications      Subjective   Patient today is lying in bed, alert/orientated. She is on 2L NC,with slight Chest pain tightness, improved from yesterday. Denies fever, chills or N/V.       Objective     Last Recorded Vitals  /56 (BP Location: Left arm, Patient Position: Lying)   Pulse 57   Temp 35.9 °C (96.6 °F) (Temporal)   Resp 17   Wt 79.8 kg (175 lb 14.8 oz)   SpO2 93%   Intake/Output last 3 Shifts:    Intake/Output Summary (Last 24 hours) at 7/13/2025 1302  Last data filed at 7/13/2025 0600  Gross per 24 hour   Intake 360 ml   Output 0 ml   Net 360 ml       Admission Weight  Weight: 67.1 kg (148 lb) (07/11/25 1327)    Daily Weight  07/13/25 : 79.8 kg (175 lb 14.8 oz)    Image Results  IR CVC exchange  Narrative: Interpreted By:  Chris Lott,   STUDY:  IR CVC EXCHANGE; 7/3/2025 4:17 pm      INDICATION:  End-stage renal disease. Externalization of cuff of tunneled central  venous catheter in right groin.      COMPARISON:  None      ACCESSION NUMBER(S):  [id number]      ORDERING CLINICIAN:  SADIE BAUTISTA      TECHNIQUE:  Exchange of tunneled central venous catheter without port.  Fluoroscopy time 0.1 minutes; dose 0.9 mGy air kerma.      FINDINGS:  Informed consent obtained. Patient positioned supine and connected to  physiologic monitoring. Intravenous sedation provided by  anesthesiology. All elements of maximal sterile barrier were utilized  including cap, mask, sterile gown, sterile gloves, large sterile  drape, hand scrub, 2% chlorhexidine for cleaning the right  groin-upper thigh and indwelling catheter. Skin and subcutaneous  tract around insertion site anesthetized with lidocaine. Using blunt  dissection as needed, the indwelling catheter was removed over a  guidewire. A new 14.5 French x 44 cm double-lumen cuffed catheter  (Palindrome) was advanced over the wire with tip positioned  near  inferior cavoatrial junction. Both lumens aspirated and flushed  freely, locked with heparin. Catheter secured and covered with  dressing. Patient tolerated procedure well.      Impression: Exchange of tunneled central venous hemodialysis catheter. The  catheter is ready for use.          Signed by: Chris Lott 7/10/2025 11:07 AM  Dictation workstation:   [id number]      Physical Exam  Constitutional:       General: She is not in acute distress.     Appearance: She is ill-appearing. She is not diaphoretic.   HENT:      Head: Normocephalic and atraumatic.      Mouth/Throat:      Mouth: Mucous membranes are moist.      Pharynx: Oropharynx is clear.   Eyes:      Extraocular Movements: Extraocular movements intact.      Conjunctiva/sclera: Conjunctivae normal.   Cardiovascular:      Rate and Rhythm: Normal rate and regular rhythm.      Heart sounds: Normal heart sounds.   Pulmonary:      Effort: Pulmonary effort is normal.      Breath sounds: No wheezing or rales.      Comments: Decreased BS  Chest:      Chest wall: No tenderness.   Abdominal:      General: Abdomen is flat. Bowel sounds are normal.      Palpations: Abdomen is soft.      Tenderness: There is no abdominal tenderness. There is no guarding.   Musculoskeletal:         General: Normal range of motion.      Cervical back: Normal range of motion and neck supple.      Left lower leg: No edema.   Skin:     General: Skin is warm.      Findings: No erythema or rash.      Comments: Right groin with cathter dialysis warm to touch, TTP. No bleeding, streaking, induration, drainage, or pus.  Dressing clean, dry and intact.   Neurological:      General: No focal deficit present.      Mental Status: She is alert. Mental status is at baseline.   Psychiatric:         Mood and Affect: Mood normal.         Behavior: Behavior normal.         Relevant Results  Lab Results   Component Value Date    GLUCOSE 97 07/13/2025    CALCIUM 7.3 (L) 07/13/2025     (L)  07/13/2025    K 5.0 07/13/2025    CO2 22 07/13/2025    CL 94 (L) 07/13/2025    BUN 50 (H) 07/13/2025    CREATININE 9.58 (H) 07/13/2025     Lab Results   Component Value Date    WBC 7.0 07/13/2025    HGB 10.0 (L) 07/13/2025    HCT 33.1 (L) 07/13/2025     (H) 07/13/2025     07/13/2025    IR CVC exchange  Result Date: 7/10/2025  Interpreted By:  Chris Lott, STUDY: IR CVC EXCHANGE; 7/3/2025 4:17 pm   INDICATION: End-stage renal disease. Externalization of cuff of tunneled central venous catheter in right groin.   COMPARISON: None   ACCESSION NUMBER(S): [id number]   ORDERING CLINICIAN: SADIE BAUTISTA   TECHNIQUE: Exchange of tunneled central venous catheter without port. Fluoroscopy time 0.1 minutes; dose 0.9 mGy air kerma.   FINDINGS: Informed consent obtained. Patient positioned supine and connected to physiologic monitoring. Intravenous sedation provided by anesthesiology. All elements of maximal sterile barrier were utilized including cap, mask, sterile gown, sterile gloves, large sterile drape, hand scrub, 2% chlorhexidine for cleaning the right groin-upper thigh and indwelling catheter. Skin and subcutaneous tract around insertion site anesthetized with lidocaine. Using blunt dissection as needed, the indwelling catheter was removed over a guidewire. A new 14.5 French x 44 cm double-lumen cuffed catheter (Palindrome) was advanced over the wire with tip positioned near inferior cavoatrial junction. Both lumens aspirated and flushed freely, locked with heparin. Catheter secured and covered with dressing. Patient tolerated procedure well.       Exchange of tunneled central venous hemodialysis catheter. The catheter is ready for use.     Signed by: Chris Lott 7/10/2025 11:07 AM Dictation workstation:   [id number]     BMP, CBC results reviewed. Vitals reviewed.  Assessment & Plan  ESRD (end stage renal disease) on dialysis (Multi)    Palliative care encounter    Bacteremia  Blood cultures 7/10/2025  blood cultures grew yeast and Staphylococcus epidermidis  Will cover with broad-spectrum antibiotics and antifungal  7/11/25 over wire removal of tunneled HD catheter  Follow cultures  Consult infectious disease, appreciate recommendations     Displaced hemodialysis catheter   Per IR note: 7/11/25 Over wire removal of tunneled HD catheter in right upper thigh. Sheath left in place to maintain access to be used for replacement of tunneled line once blood cultures clear      ESRD on hemodialysis  Renally dose meds, avoid nephrotoxic medications  Nephrology consulted, appreciate recommendations  Last HD 7/10/25  -She is interested in talking to Dr. Pedersen about peritoneal dialysis options when he returns     Hypertension  Continue antihypertensives  Monitor blood pressure close     Seizure  resumed home medication  seizure precaution     Coronary artery disease  Aspirin/statin     Anxiety/depression  resumed home medications         Plan  Monitor on telemetry  Monitor fluid/electrolytes close  Broad-spectrum antibiotics  Follow cultures  Consult ID and nephrology, appreciate recommendations  DVT prophylaxis  CBC and BMP in AM    Chiquita PLASENCIA       NP Attestation: I was present with the PA student who participated in the documentation of this note. I have personally seen and re-examined the patient and performed the medical decision-making components (assessment and plan of care). I have reviewed the PA student documentation and verified the findings in the note as written with additions or exceptions as stated in the body of this note.      Noelle Doss, ORI-CNP

## 2025-07-14 ENCOUNTER — APPOINTMENT (OUTPATIENT)
Dept: CARDIOLOGY | Facility: HOSPITAL | Age: 50
End: 2025-07-14
Payer: MEDICARE

## 2025-07-14 ENCOUNTER — ANESTHESIA EVENT (OUTPATIENT)
Dept: CARDIOLOGY | Facility: HOSPITAL | Age: 50
End: 2025-07-14
Payer: MEDICARE

## 2025-07-14 ENCOUNTER — ANESTHESIA (OUTPATIENT)
Dept: CARDIOLOGY | Facility: HOSPITAL | Age: 50
End: 2025-07-14
Payer: MEDICARE

## 2025-07-14 ENCOUNTER — APPOINTMENT (OUTPATIENT)
Dept: DIALYSIS | Facility: HOSPITAL | Age: 50
End: 2025-07-14
Payer: MEDICARE

## 2025-07-14 VITALS
DIASTOLIC BLOOD PRESSURE: 86 MMHG | TEMPERATURE: 97 F | RESPIRATION RATE: 18 BRPM | SYSTOLIC BLOOD PRESSURE: 157 MMHG | HEIGHT: 65 IN | WEIGHT: 175.93 LBS | BODY MASS INDEX: 29.31 KG/M2 | OXYGEN SATURATION: 98 % | HEART RATE: 66 BPM

## 2025-07-14 LAB
ANION GAP SERPL CALCULATED.3IONS-SCNC: 22 MMOL/L (ref 10–20)
AORTIC VALVE MEAN GRADIENT: 18 MMHG
AORTIC VALVE PEAK VELOCITY: 3.07 M/S
AV PEAK GRADIENT: 38 MMHG
AVA (PEAK VEL): 1.57 CM2
AVA (VTI): 1.63 CM2
BUN SERPL-MCNC: 60 MG/DL (ref 6–23)
CALCIUM SERPL-MCNC: 7.4 MG/DL (ref 8.6–10.3)
CHLORIDE SERPL-SCNC: 94 MMOL/L (ref 98–107)
CO2 SERPL-SCNC: 22 MMOL/L (ref 21–32)
CREAT SERPL-MCNC: 10.67 MG/DL (ref 0.5–1.05)
EGFRCR SERPLBLD CKD-EPI 2021: 4 ML/MIN/1.73M*2
EJECTION FRACTION APICAL 4 CHAMBER: 59.1
EJECTION FRACTION: 60 %
ERYTHROCYTE [DISTWIDTH] IN BLOOD BY AUTOMATED COUNT: 16.4 % (ref 11.5–14.5)
GLUCOSE BLD MANUAL STRIP-MCNC: 78 MG/DL (ref 74–99)
GLUCOSE SERPL-MCNC: 89 MG/DL (ref 74–99)
HCT VFR BLD AUTO: 30.7 % (ref 36–46)
HGB BLD-MCNC: 9.3 G/DL (ref 12–16)
LEFT ATRIUM VOLUME AREA LENGTH INDEX BSA: 26.6 ML/M2
LEFT VENTRICLE INTERNAL DIMENSION DIASTOLE: 3.82 CM (ref 3.5–6)
LEFT VENTRICULAR OUTFLOW TRACT DIAMETER: 2 CM
LV EJECTION FRACTION BIPLANE: 59 %
MCH RBC QN AUTO: 30.8 PG (ref 26–34)
MCHC RBC AUTO-ENTMCNC: 30.3 G/DL (ref 32–36)
MCV RBC AUTO: 102 FL (ref 80–100)
MITRAL VALVE E/A RATIO: 2.23
NRBC BLD-RTO: 0 /100 WBCS (ref 0–0)
PLATELET # BLD AUTO: 196 X10*3/UL (ref 150–450)
POTASSIUM SERPL-SCNC: 5.3 MMOL/L (ref 3.5–5.3)
RBC # BLD AUTO: 3.02 X10*6/UL (ref 4–5.2)
RIGHT VENTRICLE PEAK SYSTOLIC PRESSURE: 63 MMHG
SODIUM SERPL-SCNC: 133 MMOL/L (ref 136–145)
TRICUSPID ANNULAR PLANE SYSTOLIC EXCURSION: 2.4 CM
WBC # BLD AUTO: 8.3 X10*3/UL (ref 4.4–11.3)

## 2025-07-14 PROCEDURE — 7100000001 HC RECOVERY ROOM TIME - INITIAL BASE CHARGE

## 2025-07-14 PROCEDURE — 94640 AIRWAY INHALATION TREATMENT: CPT

## 2025-07-14 PROCEDURE — 2720000007 HC OR 272 NO HCPCS

## 2025-07-14 PROCEDURE — 82947 ASSAY GLUCOSE BLOOD QUANT: CPT

## 2025-07-14 PROCEDURE — 7100000002 HC RECOVERY ROOM TIME - EACH INCREMENTAL 1 MINUTE

## 2025-07-14 PROCEDURE — 3700000001 HC GENERAL ANESTHESIA TIME - INITIAL BASE CHARGE

## 2025-07-14 PROCEDURE — 5A1D70Z PERFORMANCE OF URINARY FILTRATION, INTERMITTENT, LESS THAN 6 HOURS PER DAY: ICD-10-PCS | Performed by: STUDENT IN AN ORGANIZED HEALTH CARE EDUCATION/TRAINING PROGRAM

## 2025-07-14 PROCEDURE — 36581 REPLACE TUNNELED CV CATH: CPT | Performed by: RADIOLOGY

## 2025-07-14 PROCEDURE — C1894 INTRO/SHEATH, NON-LASER: HCPCS

## 2025-07-14 PROCEDURE — 36415 COLL VENOUS BLD VENIPUNCTURE: CPT | Performed by: NURSE PRACTITIONER

## 2025-07-14 PROCEDURE — A36589 PR REMOVAL TUNNELED CV CATH W/O SUBQ PORT OR PUMP: Performed by: ANESTHESIOLOGY

## 2025-07-14 PROCEDURE — 9420000001 HC RT PATIENT EDUCATION 5 MIN

## 2025-07-14 PROCEDURE — 2500000004 HC RX 250 GENERAL PHARMACY W/ HCPCS (ALT 636 FOR OP/ED): Performed by: INTERNAL MEDICINE

## 2025-07-14 PROCEDURE — 99232 SBSQ HOSP IP/OBS MODERATE 35: CPT

## 2025-07-14 PROCEDURE — 2500000005 HC RX 250 GENERAL PHARMACY W/O HCPCS

## 2025-07-14 PROCEDURE — 77001 FLUOROGUIDE FOR VEIN DEVICE: CPT | Performed by: RADIOLOGY

## 2025-07-14 PROCEDURE — A36589 PR REMOVAL TUNNELED CV CATH W/O SUBQ PORT OR PUMP: Performed by: ANESTHESIOLOGIST ASSISTANT

## 2025-07-14 PROCEDURE — 77001 FLUOROGUIDE FOR VEIN DEVICE: CPT

## 2025-07-14 PROCEDURE — 2500000002 HC RX 250 W HCPCS SELF ADMINISTERED DRUGS (ALT 637 FOR MEDICARE OP, ALT 636 FOR OP/ED): Performed by: RADIOLOGY

## 2025-07-14 PROCEDURE — 2500000001 HC RX 250 WO HCPCS SELF ADMINISTERED DRUGS (ALT 637 FOR MEDICARE OP): Performed by: NURSE PRACTITIONER

## 2025-07-14 PROCEDURE — 3700000002 HC GENERAL ANESTHESIA TIME - EACH INCREMENTAL 1 MINUTE

## 2025-07-14 PROCEDURE — 36561 INSERT TUNNELED CV CATH: CPT | Mod: RT

## 2025-07-14 PROCEDURE — 80048 BASIC METABOLIC PNL TOTAL CA: CPT | Performed by: NURSE PRACTITIONER

## 2025-07-14 PROCEDURE — 2500000005 HC RX 250 GENERAL PHARMACY W/O HCPCS: Performed by: ANESTHESIOLOGIST ASSISTANT

## 2025-07-14 PROCEDURE — 2500000004 HC RX 250 GENERAL PHARMACY W/ HCPCS (ALT 636 FOR OP/ED): Mod: JW | Performed by: NURSE PRACTITIONER

## 2025-07-14 PROCEDURE — 8010000001 HC DIALYSIS - HEMODIALYSIS PER DAY

## 2025-07-14 PROCEDURE — 93306 TTE W/DOPPLER COMPLETE: CPT

## 2025-07-14 PROCEDURE — 2060000001 HC INTERMEDIATE ICU ROOM DAILY

## 2025-07-14 PROCEDURE — 2500000004 HC RX 250 GENERAL PHARMACY W/ HCPCS (ALT 636 FOR OP/ED)

## 2025-07-14 PROCEDURE — 93306 TTE W/DOPPLER COMPLETE: CPT | Performed by: INTERNAL MEDICINE

## 2025-07-14 PROCEDURE — 2500000004 HC RX 250 GENERAL PHARMACY W/ HCPCS (ALT 636 FOR OP/ED): Performed by: ANESTHESIOLOGIST ASSISTANT

## 2025-07-14 PROCEDURE — 85027 COMPLETE CBC AUTOMATED: CPT | Performed by: NURSE PRACTITIONER

## 2025-07-14 PROCEDURE — 2500000004 HC RX 250 GENERAL PHARMACY W/ HCPCS (ALT 636 FOR OP/ED): Performed by: RADIOLOGY

## 2025-07-14 RX ORDER — IPRATROPIUM BROMIDE AND ALBUTEROL SULFATE 2.5; .5 MG/3ML; MG/3ML
3 SOLUTION RESPIRATORY (INHALATION) EVERY 2 HOUR PRN
Status: DISCONTINUED | OUTPATIENT
Start: 2025-07-14 | End: 2025-07-17 | Stop reason: HOSPADM

## 2025-07-14 RX ORDER — MIDAZOLAM HYDROCHLORIDE 1 MG/ML
INJECTION, SOLUTION INTRAMUSCULAR; INTRAVENOUS AS NEEDED
Status: DISCONTINUED | OUTPATIENT
Start: 2025-07-14 | End: 2025-07-14

## 2025-07-14 RX ORDER — PANTOPRAZOLE SODIUM 40 MG/10ML
40 INJECTION, POWDER, LYOPHILIZED, FOR SOLUTION INTRAVENOUS
Status: DISCONTINUED | OUTPATIENT
Start: 2025-07-15 | End: 2025-07-17 | Stop reason: HOSPADM

## 2025-07-14 RX ORDER — GLYCOPYRROLATE 0.2 MG/ML
INJECTION INTRAMUSCULAR; INTRAVENOUS AS NEEDED
Status: DISCONTINUED | OUTPATIENT
Start: 2025-07-14 | End: 2025-07-14

## 2025-07-14 RX ORDER — FENTANYL CITRATE 50 UG/ML
INJECTION, SOLUTION INTRAMUSCULAR; INTRAVENOUS AS NEEDED
Status: DISCONTINUED | OUTPATIENT
Start: 2025-07-14 | End: 2025-07-14

## 2025-07-14 RX ORDER — DEXTROSE 50 % IN WATER (D50W) INTRAVENOUS SYRINGE
25
Status: DISCONTINUED | OUTPATIENT
Start: 2025-07-14 | End: 2025-07-17 | Stop reason: HOSPADM

## 2025-07-14 RX ORDER — VANCOMYCIN 750 MG/150ML
750 INJECTION, SOLUTION INTRAVENOUS ONCE
Status: COMPLETED | OUTPATIENT
Start: 2025-07-14 | End: 2025-07-14

## 2025-07-14 RX ORDER — DEXTROSE 50 % IN WATER (D50W) INTRAVENOUS SYRINGE
12.5
Status: DISCONTINUED | OUTPATIENT
Start: 2025-07-14 | End: 2025-07-17 | Stop reason: HOSPADM

## 2025-07-14 RX ORDER — PANTOPRAZOLE SODIUM 40 MG/1
40 TABLET, DELAYED RELEASE ORAL
Status: DISCONTINUED | OUTPATIENT
Start: 2025-07-15 | End: 2025-07-17 | Stop reason: HOSPADM

## 2025-07-14 RX ORDER — LIDOCAINE HYDROCHLORIDE 10 MG/ML
INJECTION, SOLUTION EPIDURAL; INFILTRATION; INTRACAUDAL; PERINEURAL AS NEEDED
Status: DISCONTINUED | OUTPATIENT
Start: 2025-07-14 | End: 2025-07-14 | Stop reason: HOSPADM

## 2025-07-14 RX ORDER — ONDANSETRON HYDROCHLORIDE 2 MG/ML
INJECTION, SOLUTION INTRAVENOUS AS NEEDED
Status: DISCONTINUED | OUTPATIENT
Start: 2025-07-14 | End: 2025-07-14

## 2025-07-14 RX ORDER — DEXMEDETOMIDINE IN 0.9 % NACL 20 MCG/5ML
SYRINGE (ML) INTRAVENOUS AS NEEDED
Status: DISCONTINUED | OUTPATIENT
Start: 2025-07-14 | End: 2025-07-14

## 2025-07-14 RX ORDER — PROPOFOL 10 MG/ML
INJECTION, EMULSION INTRAVENOUS CONTINUOUS PRN
Status: DISCONTINUED | OUTPATIENT
Start: 2025-07-14 | End: 2025-07-14

## 2025-07-14 RX ADMIN — HYDROMORPHONE HYDROCHLORIDE 0.4 MG: 0.5 INJECTION, SOLUTION INTRAMUSCULAR; INTRAVENOUS; SUBCUTANEOUS at 10:39

## 2025-07-14 RX ADMIN — IPRATROPIUM BROMIDE AND ALBUTEROL SULFATE 3 ML: 2.5; .5 SOLUTION RESPIRATORY (INHALATION) at 12:12

## 2025-07-14 RX ADMIN — HYDROMORPHONE HYDROCHLORIDE 0.4 MG: 0.5 INJECTION, SOLUTION INTRAMUSCULAR; INTRAVENOUS; SUBCUTANEOUS at 06:53

## 2025-07-14 RX ADMIN — MIDAZOLAM 2 MG: 1 INJECTION INTRAMUSCULAR; INTRAVENOUS at 12:47

## 2025-07-14 RX ADMIN — IPRATROPIUM BROMIDE AND ALBUTEROL SULFATE 3 ML: 2.5; .5 SOLUTION RESPIRATORY (INHALATION) at 07:27

## 2025-07-14 RX ADMIN — ATORVASTATIN CALCIUM 40 MG: 40 TABLET, FILM COATED ORAL at 21:45

## 2025-07-14 RX ADMIN — FENTANYL CITRATE 50 MCG: 50 INJECTION, SOLUTION INTRAMUSCULAR; INTRAVENOUS at 13:02

## 2025-07-14 RX ADMIN — SODIUM CHLORIDE SOLN NEBU 3% 3 ML: 3 NEBU SOLN at 20:31

## 2025-07-14 RX ADMIN — HYDROMORPHONE HYDROCHLORIDE 0.4 MG: 0.5 INJECTION, SOLUTION INTRAMUSCULAR; INTRAVENOUS; SUBCUTANEOUS at 00:46

## 2025-07-14 RX ADMIN — DIPHENHYDRAMINE HYDROCHLORIDE 50 MG: 50 INJECTION, SOLUTION INTRAMUSCULAR; INTRAVENOUS at 00:47

## 2025-07-14 RX ADMIN — VANCOMYCIN 750 MG: 750 INJECTION, SOLUTION INTRAVENOUS at 19:56

## 2025-07-14 RX ADMIN — PROPOFOL 50 MCG/KG/MIN: 10 INJECTION, EMULSION INTRAVENOUS at 13:03

## 2025-07-14 RX ADMIN — DIPHENHYDRAMINE HYDROCHLORIDE 50 MG: 50 INJECTION, SOLUTION INTRAMUSCULAR; INTRAVENOUS at 10:40

## 2025-07-14 RX ADMIN — Medication 8 MCG: at 13:03

## 2025-07-14 RX ADMIN — Medication 20 MG: at 12:53

## 2025-07-14 RX ADMIN — Medication 20 MCG: at 12:47

## 2025-07-14 RX ADMIN — Medication 30 MG: at 12:49

## 2025-07-14 RX ADMIN — LIDOCAINE HYDROCHLORIDE 10 ML: 10 INJECTION, SOLUTION EPIDURAL; INFILTRATION; INTRACAUDAL; PERINEURAL at 13:15

## 2025-07-14 RX ADMIN — HYDROMORPHONE HYDROCHLORIDE 0.4 MG: 0.5 INJECTION, SOLUTION INTRAMUSCULAR; INTRAVENOUS; SUBCUTANEOUS at 04:00

## 2025-07-14 RX ADMIN — DIPHENHYDRAMINE HYDROCHLORIDE 50 MG: 50 INJECTION, SOLUTION INTRAMUSCULAR; INTRAVENOUS at 17:07

## 2025-07-14 RX ADMIN — HYDROMORPHONE HYDROCHLORIDE 0.4 MG: 0.5 INJECTION, SOLUTION INTRAMUSCULAR; INTRAVENOUS; SUBCUTANEOUS at 21:46

## 2025-07-14 RX ADMIN — SODIUM CHLORIDE SOLN NEBU 3% 3 ML: 3 NEBU SOLN at 12:12

## 2025-07-14 RX ADMIN — METHOCARBAMOL 500 MG: 500 TABLET ORAL at 05:00

## 2025-07-14 RX ADMIN — DIPHENHYDRAMINE HYDROCHLORIDE 50 MG: 50 INJECTION, SOLUTION INTRAMUSCULAR; INTRAVENOUS at 04:01

## 2025-07-14 RX ADMIN — SODIUM CHLORIDE: 9 INJECTION, SOLUTION INTRAVENOUS at 12:45

## 2025-07-14 RX ADMIN — ONDANSETRON HYDROCHLORIDE 4 MG: 2 INJECTION INTRAMUSCULAR; INTRAVENOUS at 13:34

## 2025-07-14 RX ADMIN — SODIUM CHLORIDE SOLN NEBU 3% 3 ML: 3 NEBU SOLN at 07:27

## 2025-07-14 RX ADMIN — DIPHENHYDRAMINE HYDROCHLORIDE 50 MG: 50 INJECTION, SOLUTION INTRAMUSCULAR; INTRAVENOUS at 06:53

## 2025-07-14 RX ADMIN — DIPHENHYDRAMINE HYDROCHLORIDE 50 MG: 50 INJECTION, SOLUTION INTRAMUSCULAR; INTRAVENOUS at 21:46

## 2025-07-14 RX ADMIN — IPRATROPIUM BROMIDE AND ALBUTEROL SULFATE 3 ML: 2.5; .5 SOLUTION RESPIRATORY (INHALATION) at 20:31

## 2025-07-14 RX ADMIN — MICAFUNGIN SODIUM 100 MG: 100 INJECTION, POWDER, LYOPHILIZED, FOR SOLUTION INTRAVENOUS at 22:00

## 2025-07-14 RX ADMIN — GLYCOPYRROLATE 0.3 MG: 0.2 INJECTION INTRAMUSCULAR; INTRAVENOUS at 12:47

## 2025-07-14 RX ADMIN — CLONIDINE HYDROCHLORIDE 0.3 MG: 0.2 TABLET ORAL at 05:00

## 2025-07-14 RX ADMIN — CARVEDILOL 12.5 MG: 12.5 TABLET, FILM COATED ORAL at 10:00

## 2025-07-14 RX ADMIN — LEVETIRACETAM 750 MG: 250 TABLET, FILM COATED ORAL at 21:45

## 2025-07-14 RX ADMIN — PROPOFOL 100 MCG/KG/MIN: 10 INJECTION, EMULSION INTRAVENOUS at 12:52

## 2025-07-14 RX ADMIN — CARVEDILOL 12.5 MG: 12.5 TABLET, FILM COATED ORAL at 21:46

## 2025-07-14 SDOH — HEALTH STABILITY: MENTAL HEALTH: CURRENT SMOKER: 0

## 2025-07-14 ASSESSMENT — COGNITIVE AND FUNCTIONAL STATUS - GENERAL
TOILETING: A LITTLE
STANDING UP FROM CHAIR USING ARMS: A LITTLE
MOVING FROM LYING ON BACK TO SITTING ON SIDE OF FLAT BED WITH BEDRAILS: A LITTLE
TURNING FROM BACK TO SIDE WHILE IN FLAT BAD: A LITTLE
WALKING IN HOSPITAL ROOM: A LITTLE
CLIMB 3 TO 5 STEPS WITH RAILING: A LITTLE
MOVING TO AND FROM BED TO CHAIR: A LITTLE
DRESSING REGULAR LOWER BODY CLOTHING: A LITTLE
PERSONAL GROOMING: A LITTLE
HELP NEEDED FOR BATHING: A LITTLE
MOBILITY SCORE: 18
DAILY ACTIVITIY SCORE: 19
DRESSING REGULAR UPPER BODY CLOTHING: A LITTLE

## 2025-07-14 ASSESSMENT — PAIN SCALES - GENERAL
PAINLEVEL_OUTOF10: 7
PAINLEVEL_OUTOF10: 10 - WORST POSSIBLE PAIN
PAINLEVEL_OUTOF10: 0 - NO PAIN
PAINLEVEL_OUTOF10: 0 - NO PAIN
PAINLEVEL_OUTOF10: 10 - WORST POSSIBLE PAIN
PAINLEVEL_OUTOF10: 10 - WORST POSSIBLE PAIN
PAIN_LEVEL: 0
PAINLEVEL_OUTOF10: 0 - NO PAIN
PAINLEVEL_OUTOF10: 0 - NO PAIN

## 2025-07-14 ASSESSMENT — PAIN - FUNCTIONAL ASSESSMENT
PAIN_FUNCTIONAL_ASSESSMENT: 0-10
PAIN_FUNCTIONAL_ASSESSMENT: CPOT (CRITICAL CARE PAIN OBSERVATION TOOL)
PAIN_FUNCTIONAL_ASSESSMENT: 0-10
PAIN_FUNCTIONAL_ASSESSMENT: CPOT (CRITICAL CARE PAIN OBSERVATION TOOL)
PAIN_FUNCTIONAL_ASSESSMENT: CPOT (CRITICAL CARE PAIN OBSERVATION TOOL)
PAIN_FUNCTIONAL_ASSESSMENT: 0-10
PAIN_FUNCTIONAL_ASSESSMENT: CPOT (CRITICAL CARE PAIN OBSERVATION TOOL)
PAIN_FUNCTIONAL_ASSESSMENT: CPOT (CRITICAL CARE PAIN OBSERVATION TOOL)
PAIN_FUNCTIONAL_ASSESSMENT: 0-10
PAIN_FUNCTIONAL_ASSESSMENT: CPOT (CRITICAL CARE PAIN OBSERVATION TOOL)
PAIN_FUNCTIONAL_ASSESSMENT: 0-10
PAIN_FUNCTIONAL_ASSESSMENT: 0-10
PAIN_FUNCTIONAL_ASSESSMENT: CPOT (CRITICAL CARE PAIN OBSERVATION TOOL)
PAIN_FUNCTIONAL_ASSESSMENT: 0-10

## 2025-07-14 ASSESSMENT — PAIN DESCRIPTION - DESCRIPTORS
DESCRIPTORS: SHARP
DESCRIPTORS: ACHING
DESCRIPTORS: ACHING
DESCRIPTORS: ACHING;SHOOTING;THROBBING

## 2025-07-14 ASSESSMENT — PAIN DESCRIPTION - LOCATION
LOCATION: OTHER (COMMENT)
LOCATION: GROIN

## 2025-07-14 ASSESSMENT — PAIN DESCRIPTION - ORIENTATION
ORIENTATION: RIGHT

## 2025-07-14 NOTE — POST-PROCEDURE NOTE
Patient remains lethargic. Arousable with physical stimulation, able to follow basic commands, not communicative. Spoke with dialysis RN, feels comfortable receiving patient; patient does have history of lethargy and difficulty waking up from procedures. VS very stable, on 2L NC. Patient denies any pain. Pending transport.

## 2025-07-14 NOTE — PROGRESS NOTES
Pt going off floor to echo at this time.  She does have a diet order in however she has a lot of pain and believes she'll need general sedation for today's procedure, so she has not eaten or drank anything since midnight.          Will give daily mediations post IR CVC line replacement.

## 2025-07-14 NOTE — PROGRESS NOTES
Batsheva Page is a 49 y.o. female on day 3 of admission presenting with No Principal Problem: There is no principal problem currently on the Problem List. Please update the Problem List and refresh..      Subjective   Patient is feeling well, no N/V, no CP or SOB.          Objective          Vitals 24HR  Heart Rate:  [56-70]   Temp:  [35.6 °C (96.1 °F)-36.6 °C (97.9 °F)]   Resp:  [9-18]   BP: ()/(57-86)   Weight:  [72.8 kg (160 lb 7.9 oz)]   SpO2:  [96 %-100 %]     Intake/Output last 3 Shifts:    Intake/Output Summary (Last 24 hours) at 7/14/2025 1713  Last data filed at 7/14/2025 1522  Gross per 24 hour   Intake 300 ml   Output 7 ml   Net 293 ml       Physical Exam  GENERAL: No distress.   SKIN: No rashes or lesions.  EYES: PERRLA, EOMI  HEENT: Head: Normocephalic  Neck: supple and no adenopathy  LUNGS: Lungs clear to auscultation.   CARDIAC: Normal S1 and S2; no murmurs.   ABDOMEN: Soft, non-tender.   EXTREMITIES: No ulcers, Extremities normal.   NEURO: No focal deficit.     Relevant Results                  Scheduled medications  Scheduled Medications[1]  Continuous medications  Continuous Medications[2]  PRN medications  PRN Medications[3]  Results for orders placed or performed during the hospital encounter of 07/11/25 (from the past 24 hours)   CBC   Result Value Ref Range    WBC 8.3 4.4 - 11.3 x10*3/uL    nRBC 0.0 0.0 - 0.0 /100 WBCs    RBC 3.02 (L) 4.00 - 5.20 x10*6/uL    Hemoglobin 9.3 (L) 12.0 - 16.0 g/dL    Hematocrit 30.7 (L) 36.0 - 46.0 %     (H) 80 - 100 fL    MCH 30.8 26.0 - 34.0 pg    MCHC 30.3 (L) 32.0 - 36.0 g/dL    RDW 16.4 (H) 11.5 - 14.5 %    Platelets 196 150 - 450 x10*3/uL   Basic Metabolic Panel   Result Value Ref Range    Glucose 89 74 - 99 mg/dL    Sodium 133 (L) 136 - 145 mmol/L    Potassium 5.3 3.5 - 5.3 mmol/L    Chloride 94 (L) 98 - 107 mmol/L    Bicarbonate 22 21 - 32 mmol/L    Anion Gap 22 (H) 10 - 20 mmol/L    Urea Nitrogen 60 (H) 6 - 23 mg/dL    Creatinine 10.67  (H) 0.50 - 1.05 mg/dL    eGFR 4 (L) >60 mL/min/1.73m*2    Calcium 7.4 (L) 8.6 - 10.3 mg/dL   Transthoracic Echo Complete   Result Value Ref Range    AV pk peggy 3.07 m/s    LVOT diam 2.00 cm    AV mn grad 18 mmHg    MV E/A ratio 2.23     Tricuspid annular plane systolic excursion 2.4 cm    LV Biplane EF 59 %    LA vol index A/L 26.6 ml/m2    LV EF 60 %    RVSP 63 mmHg    LVIDd 3.82 cm    AV pk grad 38 mmHg    Aortic Valve Area by Continuity of VTI 1.63 cm2    Aortic Valve Area by Continuity of Peak Velocity 1.57 cm2    LV A4C EF 59.1        Assessment & Plan  ESRD (end stage renal disease) on dialysis (Multi)    Palliative care encounter      49 y.o. female   PMH; end-stage renal disease on hemodialysis, congestive heart failure, hypertension, history of multiple line infections, AVR, endocarditis, seizures       End-stage renal disease on hemodialysis,   Dialysis line infection, staph epi bacteremia and fungemia.  Hyperkalemia  Hypertension  Anemia     PLAN;   Tunneled dialysis catheter was placed in the right groin area.  Discussed with ID who okayed the placement.  Hemodialysis today, see orders.  The patient was seen on hemodialysis today.  Dose antibiotics to end-stage kidney disease.  Blood pressure was significantly elevated on arrival but has been stable since then.      Jerrod Pickett MD             [1] amLODIPine, 5 mg, oral, Daily  aspirin, 81 mg, oral, Daily  atorvastatin, 40 mg, oral, Nightly  calcitriol, 0.5 mcg, oral, Daily  carvedilol, 12.5 mg, oral, BID  cloNIDine, 0.3 mg, oral, q8h KIMBERLY  [START ON 7/18/2025] ergocalciferol, 1.25 mg, oral, Every Friday  ipratropium-albuteroL, 3 mL, nebulization, TID  levETIRAcetam, 750 mg, oral, Nightly  lidocaine, 0.1 mL, subcutaneous, Once  lidocaine, 1 patch, transdermal, q24h  methocarbamol, 500 mg, oral, q8h KIMBERLY  micafungin, 100 mg, intravenous, q24h  [START ON 7/15/2025] pantoprazole, 40 mg, oral, Daily before breakfast   Or  [START ON 7/15/2025] pantoprazole,  40 mg, intravenous, Daily before breakfast  patiromer calcium sorbitex, 1 packet, oral, Daily  pregabalin, 75 mg, oral, Daily  sodium chloride, 3 mL, nebulization, q6h while awake  vancomycin, 750 mg, intravenous, Once     [2]    [3] PRN medications: dextrose, dextrose, diphenhydrAMINE, glucagon, glucagon, hydrALAZINE, HYDROmorphone, ipratropium-albuteroL, labetaloL, LORazepam, oxyCODONE, oxygen, vancomycin

## 2025-07-14 NOTE — ANESTHESIA POSTPROCEDURE EVALUATION
Patient: Batsheva Page    Procedure Summary       Date: 07/14/25 Room / Location: [facility]    Anesthesia Start: 1246 Anesthesia Stop: 1347    Procedure: IR CVC EXCHANGE Diagnosis: (fungemia, ESRD)    Scheduled Providers: Chris Lott MD Responsible Provider: Karlo De La Vega MD    Anesthesia Type: MAC ASA Status: 3            Anesthesia Type: MAC    Vitals Value Taken Time   /69 07/14/25 14:10   Temp 36 07/14/25 14:20   Pulse 60 07/14/25 14:10   Resp 11 07/14/25 14:10   SpO2 98 % 07/14/25 14:10       Anesthesia Post Evaluation    Patient location during evaluation: PACU  Patient participation: complete - patient participated  Level of consciousness: awake  Pain score: 0  Pain management: adequate  Multimodal analgesia pain management approach  Airway patency: patent  Two or more strategies used to mitigate risk of obstructive sleep apnea  Cardiovascular status: acceptable  Respiratory status: acceptable  Hydration status: acceptable  Postoperative Nausea and Vomiting: none        There were no known notable events for this encounter.

## 2025-07-14 NOTE — PROGRESS NOTES
07/14/25 1216   Discharge Planning   Who is requesting discharge planning? Provider   Home or Post Acute Services None   Expected Discharge Disposition Home   Does the patient need discharge transport arranged? No     Plan for dialysis catheter exchange. Discharge plan to return home when medically ready. Family will transport.

## 2025-07-14 NOTE — PROGRESS NOTES
Batsheva Temple is a 49 y.o. female on day 3 of admission presenting with No Principal Problem: There is no principal problem currently on the Problem List. Please update the Problem List and refresh..      Subjective   Seen during dialysis. S/p HD cath replacement. Somewhat drowsy but arousable . Denies any pain or shortness of breath. No complaints.        Objective     Last Recorded Vitals  /72   Pulse 61   Temp 35.6 °C (96.1 °F) (Temporal)   Resp 17   Wt 72.8 kg (160 lb 7.9 oz)   SpO2 100%   Intake/Output last 3 Shifts:    Intake/Output Summary (Last 24 hours) at 7/14/2025 1551  Last data filed at 7/14/2025 1522  Gross per 24 hour   Intake 300 ml   Output 7 ml   Net 293 ml       Admission Weight  Weight: 67.1 kg (148 lb) (07/11/25 1327)    Daily Weight  07/14/25 : 72.8 kg (160 lb 7.9 oz)    Image Results  Electrocardiogram, 12-lead PRN ACS symptoms  Normal sinus rhythm  Nonspecific ST abnormality  Abnormal ECG  No previous ECGs available  Transthoracic [place] [address]             Phone [phone]    TRANSTHORACIC ECHOCARDIOGRAM REPORT    Patient Name:       BATSHEVA TEMPLE Reading Physician:    [id number] Willi Anderson MD  Study Date:         7/14/2025            Ordering Provider:    [id number] XANDER CEVALLOS  MRN/PID:            [id number]             Fellow:  Accession#:         [id number]         Nurse:  Date of Birth/Age:  1975 / 49      Sonographer:          Darcie hirsch                                      [facility]  Gender Assigned at  F                    Additional Staff:  Birth:  Height:             195.58 cm            Admit Date:  Weight:             79.38 kg             Admission Status:     Inpatient -                                                                  Routine  BSA / BMI:          2.11 m2 / 20.75      Department Location:  [facility]                      kg/m2  Blood Pressure: 146 /74 mmHg    Study Type:    TRANSTHORACIC ECHO (TTE) COMPLETE  Diagnosis/ICD: Acute and subacute infective endocarditis-I33.0  Indication:    Chronic bacterial endocarditis  CPT Codes:     Echo Complete w Full [id number]    Patient History:  Valve Disorders:   Aortic Valve Replacement, Mitral Valve Replacement and                     Tricuspid Valve Repair.  Pertinent History: CAD and HTN.    Study Detail: The following Echo studies were performed: 2D, M-Mode, Doppler and                color flow.       PHYSICIAN INTERPRETATION:  Left Ventricle: Left ventricular ejection fraction is normal by visual estimate at 60%. There are no regional wall motion abnormalities. The left ventricular cavity size is normal. There is mild increased septal and mildly increased posterior left ventricular wall thickness. There is left ventricular concentric remodeling. Spectral Doppler shows a Grade I (impaired relaxation pattern) of left ventricular diastolic filling with normal left atrial filling pressure.  Left Atrium: The left atrial size is moderately dilated.  Right Ventricle: The right ventricle is mildly enlarged. There is normal right ventricular global systolic function.  Right Atrium: The right atrial size is mild to moderately dilated.  Aortic Valve: There is a prosthetic aortic valve present. The aortic valve area by VTI is 1.63 cmï¿½ with a peak velocity of 3.07 m/s. The peak and mean gradients are 38 mmHg and 18 mmHg, respectively, with a dimensionless index of 0.52. Echo findings are consistent with normal aortic valve prosthesis structure and function. There is no evidence of aortic valve regurgitation.  Mitral Valve: There is a prosthetic mitral valve present. The doppler estimated peak and mean diastolic gradients are 19 mmHg and 6 mmHg, respectively. There is a  [employer] bioprosthetic type mitral valve prosthesis with a 27 mm reported size. The peak and mean gradients are 18.5 mmHg and 6 mmHg respectively. Echo findings are consistent with normal mitral valve prosthesis structure and function. There is no evidence of mitral valve regurgitation. The E Vmax is 1.83 m/s.  Tricuspid Valve: Status post tricuspid valve repair. The doppler estimated peak and mean diastolic gradients are 15.2 mmHg and 6.0 mmHg, respectively. No evidence of tricuspid regurgitation. The Doppler estimated right ventricular systolic pressure (RVSP) is moderately elevated at 63 mmHg.  Pulmonic Valve: The pulmonic valve is not well visualized. There is trace pulmonic valve regurgitation.  Pericardium: No pericardial effusion noted.  Aorta: The aortic root was not well visualized.  Systemic Veins: The inferior vena cava appears normal in size, IVC inspiratory collapse is not well visualized.  In comparison to the previous echocardiogram(s): Compared with study dated 12/6/2024,.       CONCLUSIONS:   1. Left ventricular ejection fraction is normal by visual estimate at 60%.   2. Spectral Doppler shows a Grade I (impaired relaxation pattern) of left ventricular diastolic filling with normal left atrial filling pressure.   3. There is normal right ventricular global systolic function.   4. Mildly enlarged right ventricle.   5. The left atrial size is moderately dilated.   6. The right atrial size is mild to moderately dilated.   7. There is a [employer] bioprosthetic type mitral valve prosthesis with a 27 mm reported size. The peak and mean gradients are 18.5 mmHg and 6 mmHg respectively.   8. Status post tricuspid valve repair.   9. The Doppler estimated RVSP is moderately elevated at 63 mmHg.  10. Echo findings are consistent with normal mitral valve prosthesis structure and function.  11. Normal aortic valve prosthesis structure and function.    QUANTITATIVE DATA SUMMARY:     2D MEASUREMENTS:              Normal Ranges:  LAs:             2.90 cm     (2.7-4.0cm)  IVSd:            1.00 cm     (0.6-1.1cm)  LVPWd:           1.02 cm     (0.6-1.1cm)  LVIDd:           3.82 cm     (3.9-5.9cm)  LVIDs:           2.60 cm  LV Mass Index:   56.8 g/m2  LVEDV Index:     40.25 ml/m2  LV % FS          31.9 %       LEFT ATRIUM:                  Normal Ranges:  LA Vol A4C:        37.9 ml    (22+/-6mL/m2)  LA Vol A2C:        78.7 ml  LA Vol BP:         56.2 ml  LA Vol Index A4C:  17.9ml/m2  LA Vol Index A2C:  37.2 ml/m2  LA Vol Index BP:   26.6 ml/m2  LA Area A4C:       15.2 cm2  LA Area A2C:       21.3 cm2  LA Major Axis A4C: 5.2 cm  LA Major Axis A2C: 4.9 cm  LA Vol A4C:        34.6 ml  LA Vol A2C:        72.3 ml  LA Vol Index BSA:  25.3 ml/m2       RIGHT ATRIUM:                 Normal Ranges:  RA Vol A4C:        38.4 ml    (8.3-19.5ml)  RA Vol Index A4C:  18.2 ml/m2  RA Area A4C:       13.8 cm2  RA Major Axis A4C: 4.2 cm       M-MODE MEASUREMENTS:         Normal Ranges:  Ao Root:             2.60 cm (2.0-3.7cm)  LAs:                 3.40 cm (2.7-4.0cm)       AORTA MEASUREMENTS:         Normal Ranges:  Asc Ao, d:          3.10 cm (2.1-3.4cm)       LV SYSTOLIC FUNCTION:                       Normal Ranges:  EF-A4C View:    59 % (>=55%)  EF-A2C View:    59 %  EF-Biplane:     59 %  EF-Visual:      60 %  LV EF Reported: 60 %       LV DIASTOLIC FUNCTION:             Normal Ranges:  MV Peak E:             1.83 m/s    (0.7-1.2 m/s)  MV Peak A:             0.82 m/s    (0.42-0.7 m/s)  E/A Ratio:             2.23        (1.0-2.2)  PulmV Sys Jamarcus:         39.80 cm/s  PulmV Martinez Jamarcus:        36.00 cm/s  PulmV S/D Jamarcus:         1.10  PulmV A Revs Jamarcus:      19.70 cm/s  PulmV A Revs Dur:      151.00 msec       MITRAL VALVE:           Normal Ranges:  MV Vmax:      2.15 m/s  (<=1.3m/s)  MV peak P.5 mmHg (<5mmHg)  MV mean P.7 mmHg  (<48mmHg)  MV DT:        296 msec  (150-240msec)       AORTIC VALVE:                      Normal Ranges:  AoV  Vmax:                3.07 m/s  (<=1.7m/s)  AoV Peak P.7 mmHg (<20mmHg)  AoV Mean P.0 mmHg (1.7-11.5mmHg)  LVOT Max Jamarcus:            1.53 m/s  (<=1.1m/s)  AoV VTI:                 65.90 cm  (18-25cm)  LVOT VTI:                34.20 cm  LVOT Diameter:           2.00 cm   (1.8-2.4cm)  AoV Area, VTI:           1.63 cm2  (2.5-5.5cm2)  AoV Area,Vmax:           1.57 cm2  (2.5-4.5cm2)  AoV Dimensionless Index: 0.52       RIGHT VENTRICLE:  RV Basal 3.22 cm  RV Mid   2.62 cm  RV Major 8.9 cm  TAPSE:   24.0 mm       TRICUSPID VALVE/RVSP:           Normal Ranges:  Peak TR Velocity:     3.87 m/s  TV Vmax               1.95 m/s  Est. RA Pressure:     3 mmHg  RV Syst Pressure:     63 mmHg   (< 30mmHg)  TV mean P.0 mmHg  TV Peak PG:           15.2 mmHg  TV VTI:               57.20 cm  IVC Diam:             1.35 cm       PULMONIC VALVE:           Normal Ranges:  PV Max Jamarcus:     2.3 m/s   (0.6-0.9m/s)  PV Max P.5 mmHg       PULMONARY VEINS:  PulmV A Revs Dur: 151.00 msec  PulmV A Revs Jamarcus: 19.70 cm/s  PulmV Martinez Jamarcus:   36.00 cm/s  PulmV S/D Jamarcus:    1.10  PulmV Sys Jamarcus:    39.80 cm/s       [id number] Willi Anderson MD  Electronically signed on 2025 at 11:52:47 AM       ** Final **      Physical Exam  Vitals and nursing note reviewed. Chaperone present: Dialysis nurse.   Constitutional:       Appearance: Normal appearance.      Comments: Somewhat drowsy but easily arousable    HENT:      Head: Normocephalic and atraumatic.      Right Ear: External ear normal.      Left Ear: External ear normal.      Nose: Nose normal.      Mouth/Throat:      Mouth: Mucous membranes are moist.   Eyes:      Extraocular Movements: Extraocular movements intact.      Conjunctiva/sclera: Conjunctivae normal.      Pupils: Pupils are equal, round, and reactive to light.   Cardiovascular:      Rate and Rhythm: Normal rate.      Pulses: Normal pulses.      Heart sounds: Normal heart sounds.   Pulmonary:       Effort: Pulmonary effort is normal.      Breath sounds: Normal breath sounds.   Abdominal:      General: Bowel sounds are normal.      Palpations: Abdomen is soft.   Musculoskeletal:         General: Normal range of motion.      Cervical back: Normal range of motion.   Skin:     General: Skin is warm and dry.      Capillary Refill: Capillary refill takes less than 2 seconds.      Comments: Diffuse scattered areas of hypopigmentation BL-UE    Neurological:      Mental Status: She is alert and oriented to person, place, and time.         Relevant Results                    This patient has a central line   Reason for the central line remaining today? Dialysis/Hemapheresis and Hemodynamic monitoring            Assessment & Plan  ESRD (end stage renal disease) on dialysis (Multi)    Palliative care encounter    Bacteremia  Blood cultures 7/10/2025 + yeast and Staphylococcus epidermidis  Continue ABX and antifungal per ID  Follow cultures     Displaced HD catheter   S/p replacement 7/14     ESRD on hemodialysis  Renally dose meds, avoid nephrotoxic medications  Reportedly Interested in talking to Dr. Pedersen about peritoneal dialysis options when he returns  Nephrology following      HTN  Continue antihypertensives  Monitor BP      Seizure  Resumed home medication  Seizure precaution     CAD  Aspirin/statin     Anxiety/depression  Resumed home medications    DVT/GI  SCD, PPI     Disposition: Home once medically optimized and cleared by all consultants           Shira Lim, ORI-CNP

## 2025-07-14 NOTE — PROGRESS NOTES
Bathseva Page is a 49 y.o. female on day 3 of admission presenting with No Principal Problem: There is no principal problem currently on the Problem List. Please update the Problem List and refresh..    Subjective   Interval History:    Afebrile, no chills  S/p central venous cath  Undergoing dialysis when seen    Review of Systems   All other systems reviewed and are negative.      Objective   Range of Vitals (last 24 hours)  Heart Rate:  [55-70]   Temp:  [35.9 °C (96.6 °F)-36.6 °C (97.9 °F)]   Resp:  [16-18]   BP: (122-157)/(56-86)   SpO2:  [93 %-100 %]   Daily Weight  07/13/25 : 79.8 kg (175 lb 14.8 oz)    Body mass index is 29.28 kg/m².    Physical Exam  Constitutional:       Appearance: Normal appearance.   HENT:      Head: Normocephalic and atraumatic.      Right Ear: External ear normal.      Left Ear: External ear normal.      Nose: Nose normal.   Eyes:      General: No scleral icterus.     Extraocular Movements: Extraocular movements intact.      Conjunctiva/sclera: Conjunctivae normal.   Cardiovascular:      Rate and Rhythm: Normal rate and regular rhythm.      Heart sounds: Normal heart sounds, S1 normal and S2 normal.   Pulmonary:      Effort: Pulmonary effort is normal.      Breath sounds: Decreased breath sounds present.   Abdominal:      General: Bowel sounds are normal.      Palpations: Abdomen is soft.      Tenderness: There is no abdominal tenderness.   Musculoskeletal:      Cervical back: Normal range of motion and neck supple.      Right lower leg: No edema.      Left lower leg: No edema.   Skin:     General: Skin is warm and dry.   Neurological:      Mental Status: She is alert.   Psychiatric:         Behavior: Behavior normal. Behavior is cooperative.        Antibiotics  micafungin (Mycamine)  mg in 100 mL D5W (VBS)  vancomycin  VANCOMYCIN 5 MG/ML IN NS LOCK    Relevant Results  Labs  Results from last 72 hours   Lab Units 07/14/25  0423 07/13/25  0541 07/12/25  0635   WBC AUTO  x10*3/uL 8.3 7.0 8.1   HEMOGLOBIN g/dL 9.3* 10.0* 9.1*   HEMATOCRIT % 30.7* 33.1* 29.3*   PLATELETS AUTO x10*3/uL 196 183 183   NEUTROS PCT AUTO %  --   --  80.3   LYMPHS PCT AUTO %  --   --  10.0   MONOS PCT AUTO %  --   --  4.7   EOS PCT AUTO %  --   --  3.9     Results from last 72 hours   Lab Units 07/14/25  0423 07/13/25  0541 07/12/25  0635   SODIUM mmol/L 133* 134* 133*   POTASSIUM mmol/L 5.3 5.0 5.1   CHLORIDE mmol/L 94* 94* 95*   CO2 mmol/L 22 22 22   BUN mg/dL 60* 50* 46*   CREATININE mg/dL 10.67* 9.58* 8.41*   GLUCOSE mg/dL 89 97 82   CALCIUM mg/dL 7.4* 7.3* 7.1*   ANION GAP mmol/L 22* 23* 21*   EGFR mL/min/1.73m*2 4* 5* 5*     Results from last 72 hours   Lab Units 07/12/25  0635   ALK PHOS U/L 48   BILIRUBIN TOTAL mg/dL 0.6   PROTEIN TOTAL g/dL 6.9   ALT U/L 5*   AST U/L 11   ALBUMIN g/dL 3.3*     Estimated Creatinine Clearance: 6.6 mL/min (A) (by C-G formula based on SCr of 10.67 mg/dL (H)).  C-Reactive Protein   Date Value Ref Range Status   12/03/2024 27.39 (H) <1.00 mg/dL Final     CRP   Date Value Ref Range Status   12/25/2022 9.90 (A) mg/dL Final     Comment:     REF VALUE  < 1.00     12/23/2022 23.46 (A) mg/dL Final     Comment:     REF VALUE  < 1.00       Microbiology  Susceptibility data from last 14 days.  Collected Specimen Info Organism   07/10/25 Blood culture from Arterial Line Staphylococcus epidermidis     Candida (Nakaseomyces) glabrata   07/10/25 Blood culture from Arterial Line Candida (Nakaseomyces) glabrata       Imaging  Electrocardiogram, 12-lead PRN ACS symptoms  Result Date: 7/14/2025  Normal sinus rhythm Nonspecific ST abnormality Abnormal ECG No previous ECGs available    Transthoracic Echo Complete  Result Date: 7/14/2025           [facility] [id number] [place], [place] [address]            Phone [phone] TRANSTHORACIC ECHOCARDIOGRAM REPORT Patient Name:       RITA TEMPLE Reading Physician:    [id number] Willi                                                                 Justin SANTOS Study Date:         7/14/2025            Ordering Provider:    [id number] XANDER MONTEGORGEKISHAN MRN/PID:            [id number]             Fellow: Accession#:         [id number]         Nurse: Date of Birth/Age:  1975 / 49      Sonographer:          Darcie hirsch RDCS Gender Assigned at  F                    Additional Staff: Birth: Height:             195.58 cm            Admit Date: Weight:             79.38 kg             Admission Status:     Inpatient -                                                                Routine BSA / BMI:          2.11 m2 / 20.75      Department Location:  [facility]                     kg/m2 Blood Pressure: 146 /74 mmHg Study Type:    TRANSTHORACIC ECHO (TTE) COMPLETE Diagnosis/ICD: Acute and subacute infective endocarditis-I33.0 Indication:    Chronic bacterial endocarditis CPT Codes:     Echo Complete w Full [id number] Patient History: Valve Disorders:   Aortic Valve Replacement, Mitral Valve Replacement and                    Tricuspid Valve Repair. Pertinent History: CAD and HTN. Study Detail: The following Echo studies were performed: 2D, M-Mode, Doppler and               color flow.  PHYSICIAN INTERPRETATION: Left Ventricle: Left ventricular ejection fraction is normal by visual estimate at 60%. There are no regional wall motion abnormalities. The left ventricular cavity size is normal. There is mild increased septal and mildly increased posterior left ventricular wall thickness. There is left ventricular concentric remodeling. Spectral Doppler shows a Grade I (impaired relaxation pattern) of left ventricular diastolic filling with normal left atrial filling pressure. Left Atrium: The left atrial size is moderately dilated. Right Ventricle: The right ventricle is mildly enlarged. There is normal right ventricular  global systolic function. Right Atrium: The right atrial size is mild to moderately dilated. Aortic Valve: There is a prosthetic aortic valve present. The aortic valve area by VTI is 1.63 cmï¿½ with a peak velocity of 3.07 m/s. The peak and mean gradients are 38 mmHg and 18 mmHg, respectively, with a dimensionless index of 0.52. Echo findings are consistent with normal aortic valve prosthesis structure and function. There is no evidence of aortic valve regurgitation. Mitral Valve: There is a prosthetic mitral valve present. The doppler estimated peak and mean diastolic gradients are 19 mmHg and 6 mmHg, respectively. There is a [employer] bioprosthetic type mitral valve prosthesis with a 27 mm reported size. The peak and mean gradients are 18.5 mmHg and 6 mmHg respectively. Echo findings are consistent with normal mitral valve prosthesis structure and function. There is no evidence of mitral valve regurgitation. The E Vmax is 1.83 m/s. Tricuspid Valve: Status post tricuspid valve repair. The doppler estimated peak and mean diastolic gradients are 15.2 mmHg and 6.0 mmHg, respectively. No evidence of tricuspid regurgitation. The Doppler estimated right ventricular systolic pressure (RVSP) is moderately elevated at 63 mmHg. Pulmonic Valve: The pulmonic valve is not well visualized. There is trace pulmonic valve regurgitation. Pericardium: No pericardial effusion noted. Aorta: The aortic root was not well visualized. Systemic Veins: The inferior vena cava appears normal in size, IVC inspiratory collapse is not well visualized. In comparison to the previous echocardiogram(s): Compared with study dated 12/6/2024,.  CONCLUSIONS:  1. Left ventricular ejection fraction is normal by visual estimate at 60%.  2. Spectral Doppler shows a Grade I (impaired relaxation pattern) of left ventricular diastolic filling with normal left atrial filling pressure.  3. There is normal right ventricular global systolic function.  4. Mildly  enlarged right ventricle.  5. The left atrial size is moderately dilated.  6. The right atrial size is mild to moderately dilated.  7. There is a [employer] bioprosthetic type mitral valve prosthesis with a 27 mm reported size. The peak and mean gradients are 18.5 mmHg and 6 mmHg respectively.  8. Status post tricuspid valve repair.  9. The Doppler estimated RVSP is moderately elevated at 63 mmHg. 10. Echo findings are consistent with normal mitral valve prosthesis structure and function. 11. Normal aortic valve prosthesis structure and function. QUANTITATIVE DATA SUMMARY:  2D MEASUREMENTS:             Normal Ranges: LAs:             2.90 cm     (2.7-4.0cm) IVSd:            1.00 cm     (0.6-1.1cm) LVPWd:           1.02 cm     (0.6-1.1cm) LVIDd:           3.82 cm     (3.9-5.9cm) LVIDs:           2.60 cm LV Mass Index:   56.8 g/m2 LVEDV Index:     40.25 ml/m2 LV % FS          31.9 %  LEFT ATRIUM:                  Normal Ranges: LA Vol A4C:        37.9 ml    (22+/-6mL/m2) LA Vol A2C:        78.7 ml LA Vol BP:         56.2 ml LA Vol Index A4C:  17.9ml/m2 LA Vol Index A2C:  37.2 ml/m2 LA Vol Index BP:   26.6 ml/m2 LA Area A4C:       15.2 cm2 LA Area A2C:       21.3 cm2 LA Major Axis A4C: 5.2 cm LA Major Axis A2C: 4.9 cm LA Vol A4C:        34.6 ml LA Vol A2C:        72.3 ml LA Vol Index BSA:  25.3 ml/m2  RIGHT ATRIUM:                 Normal Ranges: RA Vol A4C:        38.4 ml    (8.3-19.5ml) RA Vol Index A4C:  18.2 ml/m2 RA Area A4C:       13.8 cm2 RA Major Axis A4C: 4.2 cm  M-MODE MEASUREMENTS:         Normal Ranges: Ao Root:             2.60 cm (2.0-3.7cm) LAs:                 3.40 cm (2.7-4.0cm)  AORTA MEASUREMENTS:         Normal Ranges: Asc Ao, d:          3.10 cm (2.1-3.4cm)  LV SYSTOLIC FUNCTION:                      Normal Ranges: EF-A4C View:    59 % (>=55%) EF-A2C View:    59 % EF-Biplane:     59 % EF-Visual:      60 % LV EF Reported: 60 %  LV DIASTOLIC FUNCTION:             Normal Ranges: MV Peak E:              1.83 m/s    (0.7-1.2 m/s) MV Peak A:             0.82 m/s    (0.42-0.7 m/s) E/A Ratio:             2.23        (1.0-2.2) PulmV Sys Jamarcus:         39.80 cm/s PulmV Martinez Jamarcus:        36.00 cm/s PulmV S/D Jamarcus:         1.10 PulmV A Revs Jamarcus:      19.70 cm/s PulmV A Revs Dur:      151.00 msec  MITRAL VALVE:           Normal Ranges: MV Vmax:      2.15 m/s  (<=1.3m/s) MV peak P.5 mmHg (<5mmHg) MV mean P.7 mmHg  (<48mmHg) MV DT:        296 msec  (150-240msec)  AORTIC VALVE:                      Normal Ranges: AoV Vmax:                3.07 m/s  (<=1.7m/s) AoV Peak P.7 mmHg (<20mmHg) AoV Mean P.0 mmHg (1.7-11.5mmHg) LVOT Max Jamarcus:            1.53 m/s  (<=1.1m/s) AoV VTI:                 65.90 cm  (18-25cm) LVOT VTI:                34.20 cm LVOT Diameter:           2.00 cm   (1.8-2.4cm) AoV Area, VTI:           1.63 cm2  (2.5-5.5cm2) AoV Area,Vmax:           1.57 cm2  (2.5-4.5cm2) AoV Dimensionless Index: 0.52  RIGHT VENTRICLE: RV Basal 3.22 cm RV Mid   2.62 cm RV Major 8.9 cm TAPSE:   24.0 mm  TRICUSPID VALVE/RVSP:           Normal Ranges: Peak TR Velocity:     3.87 m/s TV Vmax               1.95 m/s Est. RA Pressure:     3 mmHg RV Syst Pressure:     63 mmHg   (< 30mmHg) TV mean P.0 mmHg TV Peak PG:           15.2 mmHg TV VTI:               57.20 cm IVC Diam:             1.35 cm  PULMONIC VALVE:           Normal Ranges: PV Max Jamarcus:     2.3 m/s   (0.6-0.9m/s) PV Max P.5 mmHg  PULMONARY VEINS: PulmV A Revs Dur: 151.00 msec PulmV A Revs Jamarcus: 19.70 cm/s PulmV Martinez Jamarcus:   36.00 cm/s PulmV S/D Jamarcus:    1.10 PulmV Sys Jamarcus:    39.80 cm/s  [id number] Willi Anderson MD Electronically signed on 2025 at 11:52:47 AM  ** Final **     IR CVC exchange  Result Date: 7/10/2025  Interpreted By:  Chris Lott, STUDY: IR CVC EXCHANGE; 7/3/2025 4:17 pm   INDICATION: End-stage renal disease. Externalization of cuff of tunneled central venous catheter in right groin.   COMPARISON:  None   ACCESSION NUMBER(S): [id number]   ORDERING CLINICIAN: SADIE BAUTISTA   TECHNIQUE: Exchange of tunneled central venous catheter without port. Fluoroscopy time 0.1 minutes; dose 0.9 mGy air kerma.   FINDINGS: Informed consent obtained. Patient positioned supine and connected to physiologic monitoring. Intravenous sedation provided by anesthesiology. All elements of maximal sterile barrier were utilized including cap, mask, sterile gown, sterile gloves, large sterile drape, hand scrub, 2% chlorhexidine for cleaning the right groin-upper thigh and indwelling catheter. Skin and subcutaneous tract around insertion site anesthetized with lidocaine. Using blunt dissection as needed, the indwelling catheter was removed over a guidewire. A new 14.5 French x 44 cm double-lumen cuffed catheter (McPhyrome) was advanced over the wire with tip positioned near inferior cavoatrial junction. Both lumens aspirated and flushed freely, locked with heparin. Catheter secured and covered with dressing. Patient tolerated procedure well.       Exchange of tunneled central venous hemodialysis catheter. The catheter is ready for use.     Signed by: Chris Lott 7/10/2025 11:07 AM Dictation workstation:   [id number]    CT abdomen pelvis wo IV contrast  Result Date: 6/15/2025  Interpreted By:  Melvin Williamson, STUDY: CT ABDOMEN PELVIS WO IV CONTRAST;  6/15/2025 1:10 pm   INDICATION: 48 y/o   F with  Signs/Symptoms:femoral dialysis line placement-pain.     LIMITATIONS: Evaluation of the solid organs is limited due to non use of IV contrast.   ACCESSION NUMBER(S): [id number]   ORDERING CLINICIAN: PAT BRYAN   TECHNIQUE: Thin-section noncontrast spiral axial images were obtained from the xiphoid down through the symphysis pubis. Sagittal and coronal reconstruction images were generated. Bone, mediastinal, lung, and liver windows were reviewed.   COMPARISON: Prior exam from 12/02/2024   FINDINGS: LUNG BASES: Small left pleural effusion  with mild associated left basilar atelectasis. Small partially loculated lateral and posterior right pleural effusion with associated atelectasis. Punctate posterior right basilar calcified granuloma. Previous heart surgery.   LIVER: Mild hepatomegaly with the liver measuring 19.0 cm in length on the right, compared to 20.1 cm previously. Liver density was  within the limits of normal. No gross liver lesion in this unenhaced exam.   GALLBLADDER: No calcified stone, gallbladder wall thickening, or adjacent edema.   BILE DUCTS: No intrahepatic biliary ductal dilatation. Common bile duct was within the limits of normal.   SPLEEN: Mild splenomegaly with the spleen measuring 13.2 cm AP by 12.4 cm longitudinally, previously measuring 13.4 x 13.2 cm respectively. No gross splenic mass..   PANCREAS: Mild pancreatic atrophy. No pancreatic mass or inflammation, or ductal dilatation.   KIDNEYS/ADRENALS: No adrenal mass or enlargement. There is prominent bilateral native renal atrophy. There are multiple tiny calcifications in both kidneys which could be vascular and/or stones. No hydronephrosis. There is an exophytic posterior mid to upper pole right renal cyst measuring 12 mm, and a small anterior lower pole left renal cortical cyst. No suspicious mass in either kidney in this unenhanced exam. No ureteral stone or dilatation.   BLADDER/PELVIS: Urinary bladder was empty and collapsed.. No uterine enlargement. No adnexal lesion on the left. There is asymmetric hypodense fullness of the right ovary measuring 49 x 45 mm, compared to 49 x 44 mm when remeasured at the same level on the previous exam from 12/02/2024, grossly stable.   GREAT VESSELS/RETROPERITONEUM: Catheter in the IVC as described below. Mild mural calcifications in the abdominal aorta and iliac and femoral arteries. No abdominal aortic aneurysm. Multiple small stable periaortic retroperitoneal lymph nodes in the abdomen. No suspicious mesenteric adenopathy. No  suspicious pelvic or inguinal adenopathy.   PERITONEUM: No ascites. No pneumoperitoneum. No peritoneal or mesenteric mass or inflammation.   BOWEL: The stomach was not well distended.. There was no small bowel dilatation or small bowel wall thickening. No small-bowel obstruction. There is diffuse retained colonic stool throughout the colon and rectum, least pronounced in the sigmoid and most pronounced from the cecum through the transverse colon. Some of the stool is hyperdense, suggesting stasis. There was no colonic wall thickening or large bowel obstruction.  No edema adjacent to the colon. The cecal appendix was intact.   BONES: Bones are diffusely increased in density. No destructive lytic or blastic bone lesion.   ABDOMINAL WALL: Patient has a right femoral venous catheter extends cephalad all the way to the junction of the inferior vena cava and right atrium..       Prominent atrophy of the native kidneys. Please see above for details. Small bilateral renal cysts. No hydronephrosis. Urinary bladder was empty and collapsed.   Diffusely dense bones, most likely due to renal osteodystrophy.   Right femoral venous catheter that extends all the way to the junction of the right atrium with the inferior vena cava.   Splenomegaly, slightly improved.   Pancreatic atrophy.   Mild nonspecific periaortic retroperitoneal adenopathy.   Stable nonspecific asymmetric fullness of the right ovary. This could be due to stable postmenopausal cyst or stable neoplasm. Recommend further evaluation with pelvic ultrasound.   Diffuse retained colonic stool as described with findings of stool stasis. Currently without associated edema or obstruction.   Small bilateral pleural effusions with associated atelectasis, right greater than left. Mild fluid in the fissures.   MACRO: None   Signed by: Melvin Williamson 6/15/2025 3:05 PM Dictation workstation:   [id number]      Assessment/Plan   Fungemia-most likely line related-s/p removal of  right thigh hemodialysis catheter replacement over guidewire  Positive blood culture for Staphylococcus epidermidis-contaminant versus bloodstream infection-patient has history of endocarditis     Continue vancomycin  Transthoracic echocardiogram-negative for endocarditis  IV micafungin  Follow-up repeat blood cultures-7/12/2025  Supportive care  Okay to place central venous catheter-communicated with interventional radiology earlier today    Time spent reviewing chart, formulating management plan, discussion with interdisciplinary team is 35 minutes    Monitor temperature and WBC  Long-term plan is 14 days of antibiotic therapy from negative blood culture  Ideally, patient should have ophthalmologic evaluation to rule out endophthalmitis           This is a complex infectious disease issue and the following was performed today (for more details please see the above note): Management decisions reflecting the added complexity (e.g., changes in antimicrobial therapy, infection control strategies).     Cordell Steinberg MD

## 2025-07-14 NOTE — POST-PROCEDURE NOTE
Patient lethargic, arousable to gentle physical stimulation. Denies pain, follows basic commands. Spoke with SDU RN Karen, patient to go to dialysis straight from recovery. Will observe patient until less drowsy then transfer. Patient denies acute needs.

## 2025-07-14 NOTE — PROGRESS NOTES
Pt returned to floor from dialysis, message sent to pharmacy for post treatment vancomycin.     Skin is very warm to touch, axillary temp is 37.3 C.

## 2025-07-14 NOTE — ASSESSMENT & PLAN NOTE
Bacteremia  With fungemia on Micafungin  Blood cultures with Staph Epidermidis and Candida   Repeat blood cultures NGTD  Hx of Endocarditis TTE Pending   ID following     Hemodialysis cath infection  S/p removal of R fem cath and guidewire placement for patency   Plans for replacement cath today.     ESRD on hemodialysis  Plans for dialysis once a site is obtained hopefully Monday  Last Dialysis was 7/10/2025 - hopes of dialysis today.     Pain   -Per PDMP the patient is prescribed Lyrica 75mg daily and Percocet 5-325mg TID.   -Primary team dosing her pain medication in the hospital as   Dilaudid 0.4mg IV q3hrs as needed  Oxycodone IR 5mg q4hrs as needed     RLS  Continue Lyrica dose 75mg daily    Anxiety  Benadryl IV q4h as needed for pruritus and anxiety - is effective     Palliative Care   DNR CCA DNI   The patient has decision-making capacity  Daughter Xiao is DPOA-HC, document in EMR     7/14/2025  Palliative care following mostly for goals of care.  Symptoms appear to be controlled at this time.  Patient was unable to receive dialysis since last week Thursday.  Expect many of the symptoms to resolve once the patient does receive dialysis.  There are no changes today from a palliative care perspective.  Suspect we will be able to sign off once access is in place and the patient has a solid dialysis access plan.  We will continue to follow.    7/13/2025  The patient remains on O2 via NC, states that her congestion is better.  He also endorses her pain is well-controlled with the current regimen as well as her pruritus and anxiety.  She states that she is hoping for dialysis tomorrow to help with fluid overload.  There are no changes today from a palliative care perspective.  We will continue to follow.    7/12/2025  Discussion with the patient regarding goals of care.  We did discuss former chrome status, the patient stated that she would like to continue the CODE STATUS of DNR CCA DNI.  We also discussed  dialysis options.  She stated that she still plans to consult with a PD nephrologist for consideration for PD access outpatient.  The plan is to continue her treatment medical care.  As far as pain management updates patient states her pain is controlled on her current regimen.  She does state that her anxiety could be managed better is asking for an increase in dose of Benadryl.  I did increase from 25 mg every 3 hours to 50 mg every 4 hours.  Also with complaints of chest congestion.  I did order sodium chloride nebulizer.  Will monitor for effectiveness.

## 2025-07-14 NOTE — PRE-PROCEDURE NOTE
Report from Sending RN:    Report From: FARAZ [place]  Recent Surgery of Procedure: Yes, new TDC placed  Baseline Level of Consciousness (LOC): A&Ox4  Oxygen Use: Yes, 2L as needed   Type: n/c  Diabetic: No  Last BP Med Given Day of Dialysis: see EMAR  Last Pain Med Given: see EMAR  Lab Tests to be Obtained with Dialysis: No  Blood Transfusion to be Given During Dialysis: No  Available IV Access: Yes  Medications to be Administered During Dialysis: No  Continuous IV Infusion Running: No  Restraints on Currently or in the Last 24 Hours: No  Hand-Off Communication: stable and ready for dialysis in HD room after TDC is replaced  Dialysis Catheter Dressing: will assess when patient arrives  Last Dressing Change: will assess when patient arrives

## 2025-07-14 NOTE — ANESTHESIA PREPROCEDURE EVALUATION
Patient: Batsheva Page    Procedure Information       Date/Time: 07/14/25 1200    Scheduled providers: Chris Lott MD    Procedure: IR CVC EXCHANGE    Location: [facility]            Relevant Problems   Anesthesia (within normal limits)      Cardiac   (+) Arteriosclerosis of coronary artery bypass graft   (+) Benign essential hypertension   (+) Paroxysmal atrial fibrillation (Multi)   (+) Primary hypertension      Pulmonary (within normal limits)      Neuro   (+) Anxiety   (+) Depression with anxiety   (+) Major depressive disorder, recurrent, severe with psychotic symptoms (Multi)   (+) Seizure (Multi)      GI (within normal limits)      /Renal   (+) ESRD (end stage renal disease) on dialysis (Multi)   (+) End-stage renal disease on hemodialysis (Multi)   (+) Hyponatremia      Liver (within normal limits)      Endocrine (within normal limits)      Hematology   (+) Anemia of chronic disease   (+) Anemia secondary to renal failure   (+) Iron deficiency anemia      Musculoskeletal (within normal limits)      HEENT (within normal limits)      ID   (+) MRSA (methicillin resistant Staphylococcus aureus) infection   (+) MRSA bacteremia   (+) Right foot infection   (+) Septic shock (Multi)      Skin (within normal limits)      GYN (within normal limits)       Clinical information reviewed:                 There were no vitals filed for this visit.    Surgical History[1]  Medical History[2]  Current Medications[3]  Prior to Admission medications    Medication Sig Start Date End Date Taking? Authorizing Provider   acetaminophen (Tylenol) 325 mg tablet Take 2 tablets (650 mg) by mouth every 6 hours as needed for mild pain (1 - 3). 1/15/24   Washington Perdue DO   amLODIPine (Norvasc) 5 mg tablet Take 1 tablet (5 mg) by mouth once daily. 6/20/25   Vania Escalante APRN-CNP   aspirin 81 mg EC tablet Take 1 tablet (81 mg) by mouth once daily. 1/15/24   Washington Perdue DO   atorvastatin  (Lipitor) 40 mg tablet Take 1 tablet (40 mg) by mouth once daily. 1/10/23   Historical Provider, MD   calcitriol (Rocaltrol) 0.25 mcg capsule Take 2 capsules (0.5 mcg) by mouth once daily. 4/26/23   Historical Provider, MD   carvedilol (Coreg) 12.5 mg tablet Take 1 tablet (12.5 mg) by mouth 2 times a day. 4/27/25   ANGELA Kincaid   cloNIDine (Catapres) 0.3 mg tablet Take 1 tablet (0.3 mg) by mouth every 8 hours. 4/27/25   ANGELA Kincaid   epoetin brandy-epbx (RETACRIT) 20,000 unit/mL solution Inject 6,440 Units under the skin 3 times a week. Do not start before January 17, 2024. 1/17/24   Washington Perdue, DO   ergocalciferol (Vitamin D-2) 1.25 MG (47962 UT) capsule Take 1 capsule (1.25 mg) by mouth 1 (one) time per week. FRIDAY 8/8/23   Historical Provider, MD   HYDROmorphone (Dilaudid) 2 mg tablet Take 1 tablet (2 mg) by mouth every 6 hours if needed for severe pain (7 - 10). 6/19/25   ANGELA Huynh   levETIRAcetam (Keppra) 500 mg tablet Take 1.5 tablets (750 mg) by mouth once daily at bedtime.    Historical Provider, MD   lidocaine 4 % patch Place 1 patch over 12 hours on the skin once daily. Remove & discard patch within 12 hours or as directed by MD. 6/20/25   ANGELA Huynh   methocarbamol (Robaxin) 500 mg tablet Take 1 tablet (500 mg) by mouth every 8 hours. 6/19/25   ANGELA Huynh   pregabalin (Lyrica) 75 mg capsule Take 1 capsule (75 mg) by mouth once daily.  Patient taking differently: Take 1 capsule (75 mg) by mouth 2 times a day. 6/20/25   ANGELA Huynh   promethazine (Phenergan) 25 mg tablet Take 1 tablet (25 mg) by mouth once daily as needed. 8/30/23   Historical Provider, MD   sevelamer carbonate (Renvela) 800 mg tablet Take 1 tablet (800 mg) by mouth 3 times daily (morning, midday, late afternoon). Swallow tablet whole; do not crush, break, or chew.  Patient not taking: Reported on 7/11/2025 6/19/25   Vania DYE  ANGELA Escalante   vancomycin 5 mg/mL in sodium chloride 0.9% solution 2 mL by intra-catheter route 3 (three) times a week. 4/28/25   ANGELA Kincaid     RX Allergies[4]  Social History     Tobacco Use    Smoking status: Never    Smokeless tobacco: Never   Substance Use Topics    Alcohol use: Never         Chemistry    Lab Results   Component Value Date/Time     (L) 07/14/2025 0423    K 5.3 07/14/2025 0423    CL 94 (L) 07/14/2025 0423    CO2 22 07/14/2025 0423    BUN 60 (H) 07/14/2025 0423    CREATININE 10.67 (H) 07/14/2025 0423    Lab Results   Component Value Date/Time    CALCIUM 7.4 (L) 07/14/2025 0423    ALKPHOS 48 07/12/2025 0635    AST 11 07/12/2025 0635    ALT 5 (L) 07/12/2025 0635    BILITOT 0.6 07/12/2025 0635          Lab Results   Component Value Date/Time    WBC 8.3 07/14/2025 0423    HGB 9.3 (L) 07/14/2025 0423    HCT 30.7 (L) 07/14/2025 0423     07/14/2025 0423     Lab Results   Component Value Date/Time    PROTIME 12.6 07/12/2025 0635    PROTIME 15.6 (H) 12/10/2024 2120    INR 1.2 07/12/2025 0635    INR 1.4 (H) 12/10/2024 2120     Encounter Date: 04/17/25   ECG 12 lead   Result Value    Ventricular Rate 73    Atrial Rate 73    NC Interval 202    QRS Duration 74    QT Interval 392    QTC Calculation(Bazett) 431    P Axis 55    R Axis -12    T Axis 55    QRS Count 13    Q Onset 223    P Onset 122    P Offset 178    T Offset 419    QTC Fredericia 418    Narrative    Normal sinus rhythm  Anterior infarct , age undetermined  Abnormal ECG  When compared with ECG of 14-DEC-2024 09:53,  Anterior infarct is now Present  T wave amplitude has increased in Lateral leads  Confirmed by Sade Solomon ([id number]) on 4/18/2025 1:03:27 PM       NPO Detail:  No data recorded     Physical Exam    Airway  Mallampati: II  TM distance: >3 FB  Neck ROM: full  Mouth opening: 3 or more finger widths     Cardiovascular    Dental - normal exam     Pulmonary    Abdominal            Anesthesia Plan    History  of general anesthesia?: yes  History of complications of general anesthesia?: no    ASA 3     MAC     The patient is not a current smoker.  Patient was not previously instructed to abstain from smoking on day of procedure.  Patient did not smoke on day of procedure.  Education provided regarding risk of obstructive sleep apnea.  intravenous induction   Anesthetic plan and risks discussed with patient.    Plan discussed with CRNA and CAA.           [1]   Past Surgical History:  Procedure Laterality Date    AORTIC VALVE REPLACEMENT  09/26/2022    bioprosthetic    CORONARY ARTERY BYPASS GRAFT      IR CVC  01/12/2024    exchange    IR CVC  05/07/2024    exchange    IR CVC  08/20/2024    removal    IR CVC TUNNELED  09/09/2022    IR CVC TUNNELED [place] [facility]    IR CVC TUNNELED  12/28/2022    IR CVC TUNNELED 12[place]/20[place] [facility] [place]    MITRAL VALVE REPAIR  2013    MITRAL VALVE REPLACEMENT  09/26/2022    bioprosthetic    PARATHYROIDECTOMY      TRICUSPID VALVULOPLASTY  2013    US GUIDED PERCUTANEOUS PLACEMENT  07/14/2022    US GUIDED PERCUTANEOUS PLACEMENT [facility]   [2]   Past Medical History:  Diagnosis Date    Aortic valve replaced 2022    CHF (congestive heart failure)     Chronic pain     Coronary artery disease     Disease of thyroid gland     ESRD (end stage renal disease) (Multi)     H/O mitral valve replacement 2013    and 2022    Heart disease     History of transfusion     Hypertension     Mitral valve regurgitation     Seizures (Multi)     Stroke (Multi)    [3] No current facility-administered medications for this visit.  No current outpatient medications on file.    Facility-Administered Medications Ordered in Other Visits:     amLODIPine (Norvasc) tablet 5 mg, 5 mg, oral, Daily, ORI Kincaid-CNP, 5 mg at 07/13/25 0955    [Held by provider] aspirin EC tablet 81 mg, 81 mg, oral, Daily, ANGELA Kincaid    atorvastatin (Lipitor) tablet 40 mg, 40 mg, oral,  Nightly, Noelle Doss, APRN-CNP, 40 mg at 07/13/25 2026    calcitriol (Rocaltrol) capsule 0.5 mcg, 0.5 mcg, oral, Daily, Noelle Doss APRN-CNP, 0.5 mcg at 07/13/25 0956    carvedilol (Coreg) tablet 12.5 mg, 12.5 mg, oral, BID, Noelle Doss APRN-CNP, 12.5 mg at 07/14/25 1000    cloNIDine (Catapres) tablet 0.3 mg, 0.3 mg, oral, q8h KIMBERLY, Noelle Doss, APRN-CNP, 0.3 mg at 07/14/25 0500    diphenhydrAMINE (BENADryl) injection 50 mg, 50 mg, intravenous, q3h PRN, Noelle Doss APRN-CNP, 50 mg at 07/14/25 1040    [START ON 7/18/2025] ergocalciferol (Vitamin D-2) capsule 1.25 mg, 1.25 mg, oral, Every Friday, Noelle Doss, APRN-CNP    hydrALAZINE (Apresoline) injection 5 mg, 5 mg, intravenous, q4h PRN, Noelle Doss APRN-CNP, 5 mg at 07/11/25 2030    HYDROmorphone (Dilaudid) injection 0.4 mg, 0.4 mg, intravenous, q3h PRN, Noelle Doss APRN-CNP, 0.4 mg at 07/14/25 1039    ipratropium-albuteroL (Duo-Neb) 0.5-2.5 mg/3 mL nebulizer solution 3 mL, 3 mL, nebulization, TID, Chris Lott MD, 3 mL at 07/14/25 0727    ipratropium-albuteroL (Duo-Neb) 0.5-2.5 mg/3 mL nebulizer solution 3 mL, 3 mL, nebulization, q2h PRN, Chris Lott MD    labetaloL (Normodyne,Trandate) injection 5 mg, 5 mg, intravenous, Once PRN, Masoud Serrato MD    levETIRAcetam (Keppra) tablet 750 mg, 750 mg, oral, Nightly, ANGELA Kincaid, 750 mg at 07/13/25 2026    lidocaine (Xylocaine) 10 mg/mL (1 %) injection 0.1 mL, 0.1 mL, subcutaneous, Once, Masoud Serrato MD    lidocaine 4 % patch 1 patch, 1 patch, transdermal, q24h, ANGELA Kincaid, 1 patch at 07/13/25 2000    LORazepam (Ativan) tablet 0.25 mg, 0.25 mg, oral, q6h PRN, ANGELA Kincaid    methocarbamol (Robaxin) tablet 500 mg, 500 mg, oral, q8h KIMBERLY, ANGELA Kincaid, 500 mg at 07/14/25 0500    micafungin (Mycamine) 100 mg in dextrose 5% 100 mL IV, 100 mg, intravenous, q24h, Cordell Steinberg MD, Stopped at 07/13/25  "2126    oxyCODONE (Roxicodone) immediate release tablet 5 mg, 5 mg, oral, q4h PRN, Masoud Serrato MD, 5 mg at 07/12/25 2045    oxygen (O2) therapy, , inhalation, Continuous PRN - O2/gases, Karlo De La Vega MD    patiromer calcium sorbitex (Veltassa) packet 8.4 g, 1 packet, oral, Daily, Jerrod Pickett MD    pregabalin (Lyrica) capsule 75 mg, 75 mg, oral, Daily, ANGELA Kincaid, 75 mg at 07/13/25 0955    sodium chloride 3 % nebulizer solution 3 mL, 3 mL, nebulization, q6h while awake, ANGELA Goff, 3 mL at 07/14/25 0727    vancomycin (Vancocin) pharmacy to dose - pharmacy monitoring, , miscellaneous, Daily PRN, ANGELA Kincaid  [4]   Allergies  Allergen Reactions    Compazine [Prochlorperazine] Other     anxious, agitated, \"skin crawl    Kayexalate Seizure    Reglan [Metoclopramide Hcl] Other     anxious, agitated, \"skin crawl    Zofran [Ondansetron Hcl] Headache     "

## 2025-07-14 NOTE — POST-PROCEDURE NOTE
Report to Receiving RN:    Report To:  FARAZ Jones  Time Report Called: 1928 via gantto secured chat  Hand-Off Communication: The patient removed 3 liters of fluid. pt current vitals are 163/93 HR 87 temp 36.1. Catheter ports were flushed with normal saline and capped.  She complained of having pain in her legs rating it a 10 from a scale of 1-10. no other issues to report.  Bed scale weight says she weigh 65.9 kg.   Complications During Treatment: No  Ultrafiltration Treatment: No  Medications Administered During Dialysis: No  Blood Products Administered During Dialysis: No  Labs Sent During Dialysis: No  Heparin Drip Rate Changes: No  Dialysis Catheter Dressing: Yes  Last Dressing Change: 7/14/2025 after catheter exchange    Electronic Signatures:   (Signed Jeferson Fairbanks (OCDT)    Last Updated: 7:30 PM by JEFERSON FAIRBANKS S

## 2025-07-14 NOTE — PROGRESS NOTES
"Batsheva Page is a 49 y.o. female on day 3 of admission presenting with No Principal Problem: There is no principal problem currently on the Problem List. Please update the Problem List and refresh..    Subjective   Patient was seen and examined.  Plans for right femoral dialysis cath replacement today.  Also plans for TTE today.  The goal is for dialysis today following cath insertion.  Upon my assessment patient was resting.  Is having pain but controlled with current regimen.  Additionally having shortness of breath due to fluid overload.    Objective   Physical Exam  Vitals and nursing note reviewed.   Constitutional:       Appearance: She is ill-appearing.   HENT:      Head: Normocephalic and atraumatic.      Mouth/Throat:      Mouth: Mucous membranes are moist.   Eyes:      Pupils: Pupils are equal, round, and reactive to light.   Cardiovascular:      Rate and Rhythm: Normal rate and regular rhythm.   Pulmonary:      Effort: No respiratory distress.      Comments: O2 via NC   Abdominal:      General: Bowel sounds are normal.   Musculoskeletal:         General: Swelling present.      Cervical back: Neck supple.      Comments: Face, R hand, and R leg swelling   Skin:     General: Skin is dry.      Capillary Refill: Capillary refill takes 2 to 3 seconds.   Neurological:      Mental Status: She is oriented to person, place, and time.        Last Recorded Vitals  Blood pressure 101/69, pulse 60, temperature 36.2 °C (97.2 °F), temperature source Temporal, resp. rate 11, height 1.651 m (5' 5\"), weight 72.8 kg (160 lb 7.9 oz), SpO2 98%.  Intake/Output last 3 Shifts:  No intake/output data recorded.    Relevant Results  Results for orders placed or performed during the hospital encounter of 07/11/25 (from the past 24 hours)   CBC   Result Value Ref Range    WBC 8.3 4.4 - 11.3 x10*3/uL    nRBC 0.0 0.0 - 0.0 /100 WBCs    RBC 3.02 (L) 4.00 - 5.20 x10*6/uL    Hemoglobin 9.3 (L) 12.0 - 16.0 g/dL    Hematocrit 30.7 " (L) 36.0 - 46.0 %     (H) 80 - 100 fL    MCH 30.8 26.0 - 34.0 pg    MCHC 30.3 (L) 32.0 - 36.0 g/dL    RDW 16.4 (H) 11.5 - 14.5 %    Platelets 196 150 - 450 x10*3/uL   Basic Metabolic Panel   Result Value Ref Range    Glucose 89 74 - 99 mg/dL    Sodium 133 (L) 136 - 145 mmol/L    Potassium 5.3 3.5 - 5.3 mmol/L    Chloride 94 (L) 98 - 107 mmol/L    Bicarbonate 22 21 - 32 mmol/L    Anion Gap 22 (H) 10 - 20 mmol/L    Urea Nitrogen 60 (H) 6 - 23 mg/dL    Creatinine 10.67 (H) 0.50 - 1.05 mg/dL    eGFR 4 (L) >60 mL/min/1.73m*2    Calcium 7.4 (L) 8.6 - 10.3 mg/dL   Transthoracic Echo Complete   Result Value Ref Range    AV pk peggy 3.07 m/s    LVOT diam 2.00 cm    AV mn grad 18 mmHg    MV E/A ratio 2.23     Tricuspid annular plane systolic excursion 2.4 cm    LV Biplane EF 59 %    LA vol index A/L 26.6 ml/m2    LV EF 60 %    RVSP 63 mmHg    LVIDd 3.82 cm    AV pk grad 38 mmHg    Aortic Valve Area by Continuity of VTI 1.63 cm2    Aortic Valve Area by Continuity of Peak Velocity 1.57 cm2    LV A4C EF 59.1      Electrocardiogram, 12-lead PRN ACS symptoms  Result Date: 7/14/2025  Normal sinus rhythm Nonspecific ST abnormality Abnormal ECG No previous ECGs available    Transthoracic Echo Complete  Result Date: 7/14/2025           [place] [address]            Phone [phone] TRANSTHORACIC ECHOCARDIOGRAM REPORT Patient Name:       RITA TEMPLE Reading Physician:    [id number] Willi Anderson MD Study Date:         7/14/2025            Ordering Provider:    [id number] XANDER CEVALLOS MRN/PID:            [id number]             Fellow: Accession#:         [id number]         Nurse: Date of Birth/Age:  1975 / 49      Sonographer:          Darcie hirsch                                      RD Gender Assigned at  F                     Additional Staff: Birth: Height:             195.58 cm            Admit Date: Weight:             79.38 kg             Admission Status:     Inpatient -                                                                Routine BSA / BMI:          2.11 m2 / 20.75      Department Location:  [facility]                     kg/m2 Blood Pressure: 146 /74 mmHg Study Type:    TRANSTHORACIC ECHO (TTE) COMPLETE Diagnosis/ICD: Acute and subacute infective endocarditis-I33.0 Indication:    Chronic bacterial endocarditis CPT Codes:     Echo Complete w Full [id number] Patient History: Valve Disorders:   Aortic Valve Replacement, Mitral Valve Replacement and                    Tricuspid Valve Repair. Pertinent History: CAD and HTN. Study Detail: The following Echo studies were performed: 2D, M-Mode, Doppler and               color flow.  PHYSICIAN INTERPRETATION: Left Ventricle: Left ventricular ejection fraction is normal by visual estimate at 60%. There are no regional wall motion abnormalities. The left ventricular cavity size is normal. There is mild increased septal and mildly increased posterior left ventricular wall thickness. There is left ventricular concentric remodeling. Spectral Doppler shows a Grade I (impaired relaxation pattern) of left ventricular diastolic filling with normal left atrial filling pressure. Left Atrium: The left atrial size is moderately dilated. Right Ventricle: The right ventricle is mildly enlarged. There is normal right ventricular global systolic function. Right Atrium: The right atrial size is mild to moderately dilated. Aortic Valve: There is a prosthetic aortic valve present. The aortic valve area by VTI is 1.63 cmï¿½ with a peak velocity of 3.07 m/s. The peak and mean gradients are 38 mmHg and 18 mmHg, respectively, with a dimensionless index of 0.52. Echo findings are consistent with normal aortic valve prosthesis structure and function. There is no evidence of aortic valve  regurgitation. Mitral Valve: There is a prosthetic mitral valve present. The doppler estimated peak and mean diastolic gradients are 19 mmHg and 6 mmHg, respectively. There is a [employer] bioprosthetic type mitral valve prosthesis with a 27 mm reported size. The peak and mean gradients are 18.5 mmHg and 6 mmHg respectively. Echo findings are consistent with normal mitral valve prosthesis structure and function. There is no evidence of mitral valve regurgitation. The E Vmax is 1.83 m/s. Tricuspid Valve: Status post tricuspid valve repair. The doppler estimated peak and mean diastolic gradients are 15.2 mmHg and 6.0 mmHg, respectively. No evidence of tricuspid regurgitation. The Doppler estimated right ventricular systolic pressure (RVSP) is moderately elevated at 63 mmHg. Pulmonic Valve: The pulmonic valve is not well visualized. There is trace pulmonic valve regurgitation. Pericardium: No pericardial effusion noted. Aorta: The aortic root was not well visualized. Systemic Veins: The inferior vena cava appears normal in size, IVC inspiratory collapse is not well visualized. In comparison to the previous echocardiogram(s): Compared with study dated 12/6/2024,.  CONCLUSIONS:  1. Left ventricular ejection fraction is normal by visual estimate at 60%.  2. Spectral Doppler shows a Grade I (impaired relaxation pattern) of left ventricular diastolic filling with normal left atrial filling pressure.  3. There is normal right ventricular global systolic function.  4. Mildly enlarged right ventricle.  5. The left atrial size is moderately dilated.  6. The right atrial size is mild to moderately dilated.  7. There is a [employer] bioprosthetic type mitral valve prosthesis with a 27 mm reported size. The peak and mean gradients are 18.5 mmHg and 6 mmHg respectively.  8. Status post tricuspid valve repair.  9. The Doppler estimated RVSP is moderately elevated at 63 mmHg. 10. Echo findings are consistent with normal mitral valve  prosthesis structure and function. 11. Normal aortic valve prosthesis structure and function. QUANTITATIVE DATA SUMMARY:  2D MEASUREMENTS:             Normal Ranges: LAs:             2.90 cm     (2.7-4.0cm) IVSd:            1.00 cm     (0.6-1.1cm) LVPWd:           1.02 cm     (0.6-1.1cm) LVIDd:           3.82 cm     (3.9-5.9cm) LVIDs:           2.60 cm LV Mass Index:   56.8 g/m2 LVEDV Index:     40.25 ml/m2 LV % FS          31.9 %  LEFT ATRIUM:                  Normal Ranges: LA Vol A4C:        37.9 ml    (22+/-6mL/m2) LA Vol A2C:        78.7 ml LA Vol BP:         56.2 ml LA Vol Index A4C:  17.9ml/m2 LA Vol Index A2C:  37.2 ml/m2 LA Vol Index BP:   26.6 ml/m2 LA Area A4C:       15.2 cm2 LA Area A2C:       21.3 cm2 LA Major Axis A4C: 5.2 cm LA Major Axis A2C: 4.9 cm LA Vol A4C:        34.6 ml LA Vol A2C:        72.3 ml LA Vol Index BSA:  25.3 ml/m2  RIGHT ATRIUM:                 Normal Ranges: RA Vol A4C:        38.4 ml    (8.3-19.5ml) RA Vol Index A4C:  18.2 ml/m2 RA Area A4C:       13.8 cm2 RA Major Axis A4C: 4.2 cm  M-MODE MEASUREMENTS:         Normal Ranges: Ao Root:             2.60 cm (2.0-3.7cm) LAs:                 3.40 cm (2.7-4.0cm)  AORTA MEASUREMENTS:         Normal Ranges: Asc Ao, d:          3.10 cm (2.1-3.4cm)  LV SYSTOLIC FUNCTION:                      Normal Ranges: EF-A4C View:    59 % (>=55%) EF-A2C View:    59 % EF-Biplane:     59 % EF-Visual:      60 % LV EF Reported: 60 %  LV DIASTOLIC FUNCTION:             Normal Ranges: MV Peak E:             1.83 m/s    (0.7-1.2 m/s) MV Peak A:             0.82 m/s    (0.42-0.7 m/s) E/A Ratio:             2.23        (1.0-2.2) PulmV Sys Jamarcus:         39.80 cm/s PulmV Martinez Jamarcus:        36.00 cm/s PulmV S/D Jamarcus:         1.10 PulmV A Revs Jamarcus:      19.70 cm/s PulmV A Revs Dur:      151.00 msec  MITRAL VALVE:           Normal Ranges: MV Vmax:      2.15 m/s  (<=1.3m/s) MV peak P.5 mmHg (<5mmHg) MV mean P.7 mmHg  (<48mmHg) MV DT:        296 msec   (150-240msec)  AORTIC VALVE:                      Normal Ranges: AoV Vmax:                3.07 m/s  (<=1.7m/s) AoV Peak P.7 mmHg (<20mmHg) AoV Mean P.0 mmHg (1.7-11.5mmHg) LVOT Max Jamarcus:            1.53 m/s  (<=1.1m/s) AoV VTI:                 65.90 cm  (18-25cm) LVOT VTI:                34.20 cm LVOT Diameter:           2.00 cm   (1.8-2.4cm) AoV Area, VTI:           1.63 cm2  (2.5-5.5cm2) AoV Area,Vmax:           1.57 cm2  (2.5-4.5cm2) AoV Dimensionless Index: 0.52  RIGHT VENTRICLE: RV Basal 3.22 cm RV Mid   2.62 cm RV Major 8.9 cm TAPSE:   24.0 mm  TRICUSPID VALVE/RVSP:           Normal Ranges: Peak TR Velocity:     3.87 m/s TV Vmax               1.95 m/s Est. RA Pressure:     3 mmHg RV Syst Pressure:     63 mmHg   (< 30mmHg) TV mean P.0 mmHg TV Peak PG:           15.2 mmHg TV VTI:               57.20 cm IVC Diam:             1.35 cm  PULMONIC VALVE:           Normal Ranges: PV Max Jamarcus:     2.3 m/s   (0.6-0.9m/s) PV Max P.5 mmHg  PULMONARY VEINS: PulmV A Revs Dur: 151.00 msec PulmV A Revs Jamarcus: 19.70 cm/s PulmV Martinez Jamarcus:   36.00 cm/s PulmV S/D Jamarcus:    1.10 PulmV Sys Jamarcus:    39.80 cm/s  [id number] Willi Anderson MD Electronically signed on 2025 at 11:52:47 AM  ** Final **     Scheduled medications  Scheduled Medications[1]  Continuous medications  Continuous Medications[2]  PRN medications  PRN Medications[3]     Assessment & Plan  Palliative care encounter  Bacteremia  With fungemia on Micafungin  Blood cultures with Staph Epidermidis and Candida   Repeat blood cultures NGTD  Hx of Endocarditis TTE Pending   ID following     Hemodialysis cath infection  S/p removal of R fem cath and guidewire placement for patency   Plans for replacement cath today.     ESRD on hemodialysis  Plans for dialysis once a site is obtained hopefully Monday  Last Dialysis was 7/10/2025 - hopes of dialysis today.     Pain   -Per PDMP the patient is prescribed Lyrica 75mg daily and  Percocet 5-325mg TID.   -Primary team dosing her pain medication in the hospital as   Dilaudid 0.4mg IV q3hrs as needed  Oxycodone IR 5mg q4hrs as needed     RLS  Continue Lyrica dose 75mg daily    Anxiety  Benadryl IV q4h as needed for pruritus and anxiety - is effective     Palliative Care   DNR CCA DNI   The patient has decision-making capacity  Daughter Xiao is DPOA-HC, document in EMR     7/14/2025  Palliative care following mostly for goals of care.  Symptoms appear to be controlled at this time.  Patient was unable to receive dialysis since last week Thursday.  Expect many of the symptoms to resolve once the patient does receive dialysis.  There are no changes today from a palliative care perspective.  Suspect we will be able to sign off once access is in place and the patient has a solid dialysis access plan.  We will continue to follow.    7/13/2025  The patient remains on O2 via NC, states that her congestion is better.  He also endorses her pain is well-controlled with the current regimen as well as her pruritus and anxiety.  She states that she is hoping for dialysis tomorrow to help with fluid overload.  There are no changes today from a palliative care perspective.  We will continue to follow.    7/12/2025  Discussion with the patient regarding goals of care.  We did discuss former chrome status, the patient stated that she would like to continue the CODE STATUS of DNR CCA DNI.  We also discussed dialysis options.  She stated that she still plans to consult with a PD nephrologist for consideration for PD access outpatient.  The plan is to continue her treatment medical care.  As far as pain management updates patient states her pain is controlled on her current regimen.  She does state that her anxiety could be managed better is asking for an increase in dose of Benadryl.  I did increase from 25 mg every 3 hours to 50 mg every 4 hours.  Also with complaints of chest congestion.  I did order sodium  chloride nebulizer.  Will monitor for effectiveness.     I spent 30 minutes in the professional and overall care of this patient.      ROI Goff-SAL         [1] amLODIPine, 5 mg, oral, Daily  [Held by provider] aspirin, 81 mg, oral, Daily  atorvastatin, 40 mg, oral, Nightly  calcitriol, 0.5 mcg, oral, Daily  carvedilol, 12.5 mg, oral, BID  cloNIDine, 0.3 mg, oral, q8h KIMBERLY  [START ON 7/18/2025] ergocalciferol, 1.25 mg, oral, Every Friday  ipratropium-albuteroL, 3 mL, nebulization, TID  levETIRAcetam, 750 mg, oral, Nightly  lidocaine, 0.1 mL, subcutaneous, Once  lidocaine, 1 patch, transdermal, q24h  methocarbamol, 500 mg, oral, q8h KIMBERLY  micafungin, 100 mg, intravenous, q24h  patiromer calcium sorbitex, 1 packet, oral, Daily  pregabalin, 75 mg, oral, Daily  sodium chloride, 3 mL, nebulization, q6h while awake  [2]    [3] PRN medications: dextrose, dextrose, diphenhydrAMINE, glucagon, glucagon, hydrALAZINE, HYDROmorphone, ipratropium-albuteroL, labetaloL, LORazepam, oxyCODONE, oxygen, vancomycin

## 2025-07-14 NOTE — PROGRESS NOTES
Pt was in dialysis and requested her IV benadryl and dilaudid. I went over to assess, she was oriented to self place and situation, off of the month. She was too lethargic for me to comfortably give the IV dilaudid. I had wasted the partial dose with a witnessed RN prior to going to dialysis so I couldn't return the dose to the [facility]. The rest of the dose was wasted with witness RN Karlo Griggs.     Per pt wishes, she does not want to continue to get dialysis with the place she goes. She says it's very unsanitary and she feels like she's prone to infection. Message sent to case management to address prior to dc planning.

## 2025-07-15 DIAGNOSIS — B49 FUNGEMIA: ICD-10-CM

## 2025-07-15 LAB
ANION GAP SERPL CALCULATED.3IONS-SCNC: 22 MMOL/L (ref 10–20)
BASOPHILS # BLD AUTO: 0.04 X10*3/UL (ref 0–0.1)
BASOPHILS NFR BLD AUTO: 0.4 %
BUN SERPL-MCNC: 33 MG/DL (ref 6–23)
CALCIUM SERPL-MCNC: 7.7 MG/DL (ref 8.6–10.3)
CHLORIDE SERPL-SCNC: 95 MMOL/L (ref 98–107)
CO2 SERPL-SCNC: 22 MMOL/L (ref 21–32)
CREAT SERPL-MCNC: 7.54 MG/DL (ref 0.5–1.05)
EGFRCR SERPLBLD CKD-EPI 2021: 6 ML/MIN/1.73M*2
EOSINOPHIL # BLD AUTO: 0.01 X10*3/UL (ref 0–0.7)
EOSINOPHIL NFR BLD AUTO: 0.1 %
ERYTHROCYTE [DISTWIDTH] IN BLOOD BY AUTOMATED COUNT: 16.7 % (ref 11.5–14.5)
GLUCOSE BLD MANUAL STRIP-MCNC: 73 MG/DL (ref 74–99)
GLUCOSE BLD MANUAL STRIP-MCNC: 82 MG/DL (ref 74–99)
GLUCOSE SERPL-MCNC: 82 MG/DL (ref 74–99)
HCT VFR BLD AUTO: 29.8 % (ref 36–46)
HGB BLD-MCNC: 9.2 G/DL (ref 12–16)
IMM GRANULOCYTES # BLD AUTO: 0.04 X10*3/UL (ref 0–0.7)
IMM GRANULOCYTES NFR BLD AUTO: 0.4 % (ref 0–0.9)
LYMPHOCYTES # BLD AUTO: 0.43 X10*3/UL (ref 1.2–4.8)
LYMPHOCYTES NFR BLD AUTO: 4.3 %
MCH RBC QN AUTO: 31.5 PG (ref 26–34)
MCHC RBC AUTO-ENTMCNC: 30.9 G/DL (ref 32–36)
MCV RBC AUTO: 102 FL (ref 80–100)
MONOCYTES # BLD AUTO: 0.56 X10*3/UL (ref 0.1–1)
MONOCYTES NFR BLD AUTO: 5.6 %
NEUTROPHILS # BLD AUTO: 8.93 X10*3/UL (ref 1.2–7.7)
NEUTROPHILS NFR BLD AUTO: 89.2 %
NRBC BLD-RTO: 0 /100 WBCS (ref 0–0)
PLATELET # BLD AUTO: 212 X10*3/UL (ref 150–450)
POTASSIUM SERPL-SCNC: 5 MMOL/L (ref 3.5–5.3)
RBC # BLD AUTO: 2.92 X10*6/UL (ref 4–5.2)
SODIUM SERPL-SCNC: 134 MMOL/L (ref 136–145)
WBC # BLD AUTO: 10 X10*3/UL (ref 4.4–11.3)

## 2025-07-15 PROCEDURE — 2500000001 HC RX 250 WO HCPCS SELF ADMINISTERED DRUGS (ALT 637 FOR MEDICARE OP)

## 2025-07-15 PROCEDURE — 2500000001 HC RX 250 WO HCPCS SELF ADMINISTERED DRUGS (ALT 637 FOR MEDICARE OP): Performed by: NURSE PRACTITIONER

## 2025-07-15 PROCEDURE — 85025 COMPLETE CBC W/AUTO DIFF WBC: CPT

## 2025-07-15 PROCEDURE — 2500000002 HC RX 250 W HCPCS SELF ADMINISTERED DRUGS (ALT 637 FOR MEDICARE OP, ALT 636 FOR OP/ED): Performed by: RADIOLOGY

## 2025-07-15 PROCEDURE — 2500000004 HC RX 250 GENERAL PHARMACY W/ HCPCS (ALT 636 FOR OP/ED): Performed by: INTERNAL MEDICINE

## 2025-07-15 PROCEDURE — 99232 SBSQ HOSP IP/OBS MODERATE 35: CPT

## 2025-07-15 PROCEDURE — 36415 COLL VENOUS BLD VENIPUNCTURE: CPT

## 2025-07-15 PROCEDURE — 2500000004 HC RX 250 GENERAL PHARMACY W/ HCPCS (ALT 636 FOR OP/ED): Performed by: NURSE PRACTITIONER

## 2025-07-15 PROCEDURE — 82947 ASSAY GLUCOSE BLOOD QUANT: CPT

## 2025-07-15 PROCEDURE — 1200000002 HC GENERAL ROOM WITH TELEMETRY DAILY

## 2025-07-15 PROCEDURE — 2500000002 HC RX 250 W HCPCS SELF ADMINISTERED DRUGS (ALT 637 FOR MEDICARE OP, ALT 636 FOR OP/ED): Performed by: INTERNAL MEDICINE

## 2025-07-15 PROCEDURE — 80048 BASIC METABOLIC PNL TOTAL CA: CPT

## 2025-07-15 PROCEDURE — 2500000005 HC RX 250 GENERAL PHARMACY W/O HCPCS

## 2025-07-15 PROCEDURE — 94640 AIRWAY INHALATION TREATMENT: CPT

## 2025-07-15 RX ORDER — MICAFUNGIN IN SODIUM CHLORIDE 100 MG/100ML
100 INJECTION INTRAVENOUS DAILY
Qty: 900 ML | Refills: 0 | Status: SHIPPED
Start: 2025-07-15 | End: 2025-07-24

## 2025-07-15 RX ADMIN — METHOCARBAMOL 500 MG: 500 TABLET ORAL at 23:20

## 2025-07-15 RX ADMIN — CARVEDILOL 12.5 MG: 12.5 TABLET, FILM COATED ORAL at 23:20

## 2025-07-15 RX ADMIN — ASPIRIN 81 MG: 81 TABLET, DELAYED RELEASE ORAL at 08:52

## 2025-07-15 RX ADMIN — METHOCARBAMOL 500 MG: 500 TABLET ORAL at 07:18

## 2025-07-15 RX ADMIN — CALCITRIOL CAPSULES 0.25 MCG 0.5 MCG: 0.25 CAPSULE ORAL at 08:51

## 2025-07-15 RX ADMIN — PREGABALIN 75 MG: 75 CAPSULE ORAL at 08:51

## 2025-07-15 RX ADMIN — CARVEDILOL 12.5 MG: 12.5 TABLET, FILM COATED ORAL at 08:51

## 2025-07-15 RX ADMIN — AMLODIPINE BESYLATE 5 MG: 5 TABLET ORAL at 08:51

## 2025-07-15 RX ADMIN — ATORVASTATIN CALCIUM 40 MG: 40 TABLET, FILM COATED ORAL at 23:20

## 2025-07-15 RX ADMIN — HYDROMORPHONE HYDROCHLORIDE 0.4 MG: 0.5 INJECTION, SOLUTION INTRAMUSCULAR; INTRAVENOUS; SUBCUTANEOUS at 05:43

## 2025-07-15 RX ADMIN — CLONIDINE HYDROCHLORIDE 0.3 MG: 0.2 TABLET ORAL at 23:19

## 2025-07-15 RX ADMIN — CLONIDINE HYDROCHLORIDE 0.3 MG: 0.2 TABLET ORAL at 07:18

## 2025-07-15 RX ADMIN — PANTOPRAZOLE SODIUM 40 MG: 40 TABLET, DELAYED RELEASE ORAL at 08:57

## 2025-07-15 RX ADMIN — SODIUM CHLORIDE SOLN NEBU 3% 3 ML: 3 NEBU SOLN at 19:26

## 2025-07-15 RX ADMIN — DIPHENHYDRAMINE HYDROCHLORIDE 50 MG: 50 INJECTION, SOLUTION INTRAMUSCULAR; INTRAVENOUS at 14:56

## 2025-07-15 RX ADMIN — SODIUM CHLORIDE SOLN NEBU 3% 3 ML: 3 NEBU SOLN at 11:18

## 2025-07-15 RX ADMIN — METHOCARBAMOL 500 MG: 500 TABLET ORAL at 00:16

## 2025-07-15 RX ADMIN — IPRATROPIUM BROMIDE AND ALBUTEROL SULFATE 3 ML: 2.5; .5 SOLUTION RESPIRATORY (INHALATION) at 07:46

## 2025-07-15 RX ADMIN — DIPHENHYDRAMINE HYDROCHLORIDE 50 MG: 50 INJECTION, SOLUTION INTRAMUSCULAR; INTRAVENOUS at 01:58

## 2025-07-15 RX ADMIN — CLONIDINE HYDROCHLORIDE 0.3 MG: 0.2 TABLET ORAL at 00:58

## 2025-07-15 RX ADMIN — MICAFUNGIN SODIUM 100 MG: 100 INJECTION, POWDER, LYOPHILIZED, FOR SOLUTION INTRAVENOUS at 23:32

## 2025-07-15 RX ADMIN — DIPHENHYDRAMINE HYDROCHLORIDE 50 MG: 50 INJECTION, SOLUTION INTRAMUSCULAR; INTRAVENOUS at 10:24

## 2025-07-15 RX ADMIN — HYDROMORPHONE HYDROCHLORIDE 0.4 MG: 0.5 INJECTION, SOLUTION INTRAMUSCULAR; INTRAVENOUS; SUBCUTANEOUS at 18:23

## 2025-07-15 RX ADMIN — DIPHENHYDRAMINE HYDROCHLORIDE 50 MG: 50 INJECTION, SOLUTION INTRAMUSCULAR; INTRAVENOUS at 23:21

## 2025-07-15 RX ADMIN — DIPHENHYDRAMINE HYDROCHLORIDE 50 MG: 50 INJECTION, SOLUTION INTRAMUSCULAR; INTRAVENOUS at 18:23

## 2025-07-15 RX ADMIN — HYDROMORPHONE HYDROCHLORIDE 0.4 MG: 0.5 INJECTION, SOLUTION INTRAMUSCULAR; INTRAVENOUS; SUBCUTANEOUS at 14:56

## 2025-07-15 RX ADMIN — SODIUM CHLORIDE SOLN NEBU 3% 3 ML: 3 NEBU SOLN at 07:46

## 2025-07-15 RX ADMIN — HYDROMORPHONE HYDROCHLORIDE 0.4 MG: 0.5 INJECTION, SOLUTION INTRAMUSCULAR; INTRAVENOUS; SUBCUTANEOUS at 23:19

## 2025-07-15 RX ADMIN — IPRATROPIUM BROMIDE AND ALBUTEROL SULFATE 3 ML: 2.5; .5 SOLUTION RESPIRATORY (INHALATION) at 11:18

## 2025-07-15 RX ADMIN — IPRATROPIUM BROMIDE AND ALBUTEROL SULFATE 3 ML: 2.5; .5 SOLUTION RESPIRATORY (INHALATION) at 19:26

## 2025-07-15 RX ADMIN — LEVETIRACETAM 750 MG: 250 TABLET, FILM COATED ORAL at 23:20

## 2025-07-15 RX ADMIN — DIPHENHYDRAMINE HYDROCHLORIDE 50 MG: 50 INJECTION, SOLUTION INTRAMUSCULAR; INTRAVENOUS at 05:42

## 2025-07-15 RX ADMIN — HYDROMORPHONE HYDROCHLORIDE 0.4 MG: 0.5 INJECTION, SOLUTION INTRAMUSCULAR; INTRAVENOUS; SUBCUTANEOUS at 10:23

## 2025-07-15 RX ADMIN — HYDROMORPHONE HYDROCHLORIDE 0.4 MG: 0.5 INJECTION, SOLUTION INTRAMUSCULAR; INTRAVENOUS; SUBCUTANEOUS at 01:58

## 2025-07-15 ASSESSMENT — COGNITIVE AND FUNCTIONAL STATUS - GENERAL
DRESSING REGULAR UPPER BODY CLOTHING: A LITTLE
TOILETING: A LITTLE
EATING MEALS: A LITTLE
DAILY ACTIVITIY SCORE: 20
PERSONAL GROOMING: A LITTLE

## 2025-07-15 ASSESSMENT — PAIN SCALES - GENERAL
PAINLEVEL_OUTOF10: 10 - WORST POSSIBLE PAIN
PAINLEVEL_OUTOF10: 8
PAINLEVEL_OUTOF10: 10 - WORST POSSIBLE PAIN
PAINLEVEL_OUTOF10: 8
PAINLEVEL_OUTOF10: 5 - MODERATE PAIN

## 2025-07-15 ASSESSMENT — PAIN DESCRIPTION - DESCRIPTORS
DESCRIPTORS: SHARP
DESCRIPTORS: ACHING
DESCRIPTORS: SHARP
DESCRIPTORS: SHARP
DESCRIPTORS: ACHING;SHARP
DESCRIPTORS: SHARP
DESCRIPTORS: ACHING

## 2025-07-15 ASSESSMENT — PAIN DESCRIPTION - LOCATION
LOCATION: GROIN
LOCATION: GENERALIZED

## 2025-07-15 ASSESSMENT — PAIN DESCRIPTION - ORIENTATION: ORIENTATION: RIGHT

## 2025-07-15 NOTE — PROGRESS NOTES
Vancomycin Dosing by Pharmacy- Cessation of Therapy    Consult to pharmacy for vancomycin dosing has been discontinued by the prescriber, pharmacy will sign off at this time.    Please call pharmacy if there are further questions or re-enter a consult if vancomycin is resumed.     Maurice García, [facility]

## 2025-07-15 NOTE — PROGRESS NOTES
"Batsheva Page is a 49 y.o. female on day 4 of admission presenting with ESRD and malfuctioning HD catheter.    Subjective   Symptoms (0 - 10, Best to Worst)  [place] Symptom Assessment System  0-10 (Numeric) Pain Score: 10 - Worst possible pain  Pt. sleeping (received her IV Dilaudid and Benadryl prior to my assessment),  Arousable  Update offered by SDU RNSrinivasan - no issues to report  Pt. received HD yesterday, removal of 3 L  Supplemental oxygen - 3 L NC pulse oxing 93-95%     Objective     Physical Exam  Vitals and nursing note reviewed.   Constitutional:       Appearance: She is ill-appearing (Chronically).   HENT:      Head: Normocephalic and atraumatic.   Eyes:      Conjunctiva/sclera: Conjunctivae normal.      Pupils: Pupils are equal, round, and reactive to light.   Cardiovascular:      Rate and Rhythm: Normal rate and regular rhythm.      Pulses: Normal pulses.      Heart sounds: Normal heart sounds.      Comments: Noted BUE edema  R femoral HD catheter  Pulmonary:      Effort: Pulmonary effort is normal.      Breath sounds: Normal breath sounds.      Comments: On supplemental oxygen 3 L NC  Abdominal:      General: Abdomen is flat. Bowel sounds are normal.      Palpations: Abdomen is soft.   Musculoskeletal:         General: Normal range of motion.      Cervical back: Neck supple.   Skin:     General: Skin is warm and dry.      Capillary Refill: Capillary refill takes 2 to 3 seconds.      Coloration: Skin is pale.   Neurological:      Mental Status: She is oriented to person, place, and time. Mental status is at baseline.      Motor: Weakness present.      Comments: Sleepy (medication related)     Last Recorded Vitals  Blood pressure 140/76, pulse 84, temperature 37.1 °C (98.8 °F), temperature source Temporal, resp. rate 16, height 1.651 m (5' 5\"), weight 69.6 kg (153 lb 7 oz), SpO2 93%.  Intake/Output last 3 Shifts:  I/O last 3 completed shifts:  In: 1344 (19.3 mL/kg) [P.O.:444; I.V.:400 (5.7 " mL/kg); Other:400; IV Piggyback:100]  Out: 3407 (49 mL/kg) [Other:3400; Blood:7]  Weight: 69.6 kg     Relevant Results  Results for orders placed or performed during the hospital encounter of 07/11/25 (from the past 24 hours)   POCT GLUCOSE   Result Value Ref Range    POCT Glucose 78 74 - 99 mg/dL   Basic Metabolic Panel   Result Value Ref Range    Glucose 82 74 - 99 mg/dL    Sodium 134 (L) 136 - 145 mmol/L    Potassium 5.0 3.5 - 5.3 mmol/L    Chloride 95 (L) 98 - 107 mmol/L    Bicarbonate 22 21 - 32 mmol/L    Anion Gap 22 (H) 10 - 20 mmol/L    Urea Nitrogen 33 (H) 6 - 23 mg/dL    Creatinine 7.54 (H) 0.50 - 1.05 mg/dL    eGFR 6 (L) >60 mL/min/1.73m*2    Calcium 7.7 (L) 8.6 - 10.3 mg/dL   CBC and Auto Differential   Result Value Ref Range    WBC 10.0 4.4 - 11.3 x10*3/uL    nRBC 0.0 0.0 - 0.0 /100 WBCs    RBC 2.92 (L) 4.00 - 5.20 x10*6/uL    Hemoglobin 9.2 (L) 12.0 - 16.0 g/dL    Hematocrit 29.8 (L) 36.0 - 46.0 %     (H) 80 - 100 fL    MCH 31.5 26.0 - 34.0 pg    MCHC 30.9 (L) 32.0 - 36.0 g/dL    RDW 16.7 (H) 11.5 - 14.5 %    Platelets 212 150 - 450 x10*3/uL    Neutrophils % 89.2 40.0 - 80.0 %    Immature Granulocytes %, Automated 0.4 0.0 - 0.9 %    Lymphocytes % 4.3 13.0 - 44.0 %    Monocytes % 5.6 2.0 - 10.0 %    Eosinophils % 0.1 0.0 - 6.0 %    Basophils % 0.4 0.0 - 2.0 %    Neutrophils Absolute 8.93 (H) 1.20 - 7.70 x10*3/uL    Immature Granulocytes Absolute, Automated 0.04 0.00 - 0.70 x10*3/uL    Lymphocytes Absolute 0.43 (L) 1.20 - 4.80 x10*3/uL    Monocytes Absolute 0.56 0.10 - 1.00 x10*3/uL    Eosinophils Absolute 0.01 0.00 - 0.70 x10*3/uL    Basophils Absolute 0.04 0.00 - 0.10 x10*3/uL      Transthoracic Echo Complete  Result Date: 7/14/2025           [place] [id number]            Phone [phone] TRANSTHORACIC ECHOCARDIOGRAM REPORT Patient Name:       RITA TEMPLE Reading Physician:    [id number] Willi                                                                 Justin SANTOS Study Date:         7/14/2025            Ordering Provider:    [id number] XANDER MONTEGORGEKISHAN MRN/PID:            [id number]             Fellow: Accession#:         [id number]         Nurse: Date of Birth/Age:  1975 / 49      Sonographer:          Darcie hirsch RDCS Gender Assigned at  F                    Additional Staff: Birth: Height:             195.58 cm            Admit Date: Weight:             79.38 kg             Admission Status:     Inpatient -                                                                Routine BSA / BMI:          2.11 m2 / 20.75      Department Location:  [facility]                     kg/m2 Blood Pressure: 146 /74 mmHg Study Type:    TRANSTHORACIC ECHO (TTE) COMPLETE Diagnosis/ICD: Acute and subacute infective endocarditis-I33.0 Indication:    Chronic bacterial endocarditis CPT Codes:     Echo Complete w Full Doppler-[id number] Patient History: Valve Disorders:   Aortic Valve Replacement, Mitral Valve Replacement and                    Tricuspid Valve Repair. Pertinent History: CAD and HTN. Study Detail: The following Echo studies were performed: 2D, M-Mode, Doppler and               color flow.  PHYSICIAN INTERPRETATION: Left Ventricle: Left ventricular ejection fraction is normal by visual estimate at 60%. There are no regional wall motion abnormalities. The left ventricular cavity size is normal. There is mild increased septal and mildly increased posterior left ventricular wall thickness. There is left ventricular concentric remodeling. Spectral Doppler shows a Grade I (impaired relaxation pattern) of left ventricular diastolic filling with normal left atrial filling pressure. Left Atrium: The left atrial size is moderately dilated. Right Ventricle: The right ventricle is mildly enlarged. There is normal right ventricular global  systolic function. Right Atrium: The right atrial size is mild to moderately dilated. Aortic Valve: There is a prosthetic aortic valve present. The aortic valve area by VTI is 1.63 cmï¿½ with a peak velocity of 3.07 m/s. The peak and mean gradients are 38 mmHg and 18 mmHg, respectively, with a dimensionless index of 0.52. Echo findings are consistent with normal aortic valve prosthesis structure and function. There is no evidence of aortic valve regurgitation. Mitral Valve: There is a prosthetic mitral valve present. The doppler estimated peak and mean diastolic gradients are 19 mmHg and 6 mmHg, respectively. There is a [employer] bioprosthetic type mitral valve prosthesis with a 27 mm reported size. The peak and mean gradients are 18.5 mmHg and 6 mmHg respectively. Echo findings are consistent with normal mitral valve prosthesis structure and function. There is no evidence of mitral valve regurgitation. The E Vmax is 1.83 m/s. Tricuspid Valve: Status post tricuspid valve repair. The doppler estimated peak and mean diastolic gradients are 15.2 mmHg and 6.0 mmHg, respectively. No evidence of tricuspid regurgitation. The Doppler estimated right ventricular systolic pressure (RVSP) is moderately elevated at 63 mmHg. Pulmonic Valve: The pulmonic valve is not well visualized. There is trace pulmonic valve regurgitation. Pericardium: No pericardial effusion noted. Aorta: The aortic root was not well visualized. Systemic Veins: The inferior vena cava appears normal in size, IVC inspiratory collapse is not well visualized. In comparison to the previous echocardiogram(s): Compared with study dated 12/6/2024,.  CONCLUSIONS:  1. Left ventricular ejection fraction is normal by visual estimate at 60%.  2. Spectral Doppler shows a Grade I (impaired relaxation pattern) of left ventricular diastolic filling with normal left atrial filling pressure.  3. There is normal right ventricular global systolic function.  4. Mildly enlarged  right ventricle.  5. The left atrial size is moderately dilated.  6. The right atrial size is mild to moderately dilated.  7. There is a [employer] bioprosthetic type mitral valve prosthesis with a 27 mm reported size. The peak and mean gradients are 18.5 mmHg and 6 mmHg respectively.  8. Status post tricuspid valve repair.  9. The Doppler estimated RVSP is moderately elevated at 63 mmHg. 10. Echo findings are consistent with normal mitral valve prosthesis structure and function. 11. Normal aortic valve prosthesis structure and function. QUANTITATIVE DATA SUMMARY:  2D MEASUREMENTS:             Normal Ranges: LAs:             2.90 cm     (2.7-4.0cm) IVSd:            1.00 cm     (0.6-1.1cm) LVPWd:           1.02 cm     (0.6-1.1cm) LVIDd:           3.82 cm     (3.9-5.9cm) LVIDs:           2.60 cm LV Mass Index:   56.8 g/m2 LVEDV Index:     40.25 ml/m2 LV % FS          31.9 %  LEFT ATRIUM:                  Normal Ranges: LA Vol A4C:        37.9 ml    (22+/-6mL/m2) LA Vol A2C:        78.7 ml LA Vol BP:         56.2 ml LA Vol Index A4C:  17.9ml/m2 LA Vol Index A2C:  37.2 ml/m2 LA Vol Index BP:   26.6 ml/m2 LA Area A4C:       15.2 cm2 LA Area A2C:       21.3 cm2 LA Major Axis A4C: 5.2 cm LA Major Axis A2C: 4.9 cm LA Vol A4C:        34.6 ml LA Vol A2C:        72.3 ml LA Vol Index BSA:  25.3 ml/m2  RIGHT ATRIUM:                 Normal Ranges: RA Vol A4C:        38.4 ml    (8.3-19.5ml) RA Vol Index A4C:  18.2 ml/m2 RA Area A4C:       13.8 cm2 RA Major Axis A4C: 4.2 cm  M-MODE MEASUREMENTS:         Normal Ranges: Ao Root:             2.60 cm (2.0-3.7cm) LAs:                 3.40 cm (2.7-4.0cm)  AORTA MEASUREMENTS:         Normal Ranges: Asc Ao, d:          3.10 cm (2.1-3.4cm)  LV SYSTOLIC FUNCTION:                      Normal Ranges: EF-A4C View:    59 % (>=55%) EF-A2C View:    59 % EF-Biplane:     59 % EF-Visual:      60 % LV EF Reported: 60 %  LV DIASTOLIC FUNCTION:             Normal Ranges: MV Peak E:             1.83 m/s     (0.7-1.2 m/s) MV Peak A:             0.82 m/s    (0.42-0.7 m/s) E/A Ratio:             2.23        (1.0-2.2) PulmV Sys Jamarcus:         39.80 cm/s PulmV Martinez Jamarcus:        36.00 cm/s PulmV S/D Jamarcus:         1.10 PulmV A Revs Jamarcus:      19.70 cm/s PulmV A Revs Dur:      151.00 msec  MITRAL VALVE:           Normal Ranges: MV Vmax:      2.15 m/s  (<=1.3m/s) MV peak P.5 mmHg (<5mmHg) MV mean P.7 mmHg  (<48mmHg) MV DT:        296 msec  (150-240msec)  AORTIC VALVE:                      Normal Ranges: AoV Vmax:                3.07 m/s  (<=1.7m/s) AoV Peak P.7 mmHg (<20mmHg) AoV Mean P.0 mmHg (1.7-11.5mmHg) LVOT Max Jamarcus:            1.53 m/s  (<=1.1m/s) AoV VTI:                 65.90 cm  (18-25cm) LVOT VTI:                34.20 cm LVOT Diameter:           2.00 cm   (1.8-2.4cm) AoV Area, VTI:           1.63 cm2  (2.5-5.5cm2) AoV Area,Vmax:           1.57 cm2  (2.5-4.5cm2) AoV Dimensionless Index: 0.52  RIGHT VENTRICLE: RV Basal 3.22 cm RV Mid   2.62 cm RV Major 8.9 cm TAPSE:   24.0 mm  TRICUSPID VALVE/RVSP:           Normal Ranges: Peak TR Velocity:     3.87 m/s TV Vmax               1.95 m/s Est. RA Pressure:     3 mmHg RV Syst Pressure:     63 mmHg   (< 30mmHg) TV mean P.0 mmHg TV Peak PG:           15.2 mmHg TV VTI:               57.20 cm IVC Diam:             1.35 cm  PULMONIC VALVE:           Normal Ranges: PV Max Jamarcus:     2.3 m/s   (0.6-0.9m/s) PV Max P.5 mmHg  PULMONARY VEINS: PulmV A Revs Dur: 151.00 msec PulmV A Revs Jamarcus: 19.70 cm/s PulmV Martinez Jamarcus:   36.00 cm/s PulmV S/D Jamarcus:    1.10 PulmV Sys Jamarcus:    39.80 cm/s  [id number] Willi Anderson MD Electronically signed on 2025 at 11:52:47 AM  ** Final **      Scheduled medications  Scheduled Medications[1]  Continuous medications  Continuous Medications[2]  PRN medications  PRN Medications[3]     Assessment/Plan   Bacteremia  With fungemia on Micafungin - discontinued on   Blood cultures with Staph Epidermidis  and Candida   Repeat blood cultures NGTD  Hx of Endocarditis TTE completed on 7/14:   LVEF - 60% with no evidence of endocarditis -  Echo findings are consistent with normal mitral valve prosthesis structure and function. Normal aortic valve prosthesis structure and function.  ID following      Hemodialysis cath infection  S/p removal of R fem cath and guidewire placement for patency   Replacement cath completed.      ESRD on hemodialysis  Plans for dialysis once a site is obtained hopefully Monday  Last Dialysis was Monday, 7/14 (3 L removed)      Pain   -Per PDMP the patient is prescribed Lyrica 75mg daily and Percocet 5-325mg TID.   -Primary team dosing her pain medication in the hospital as:   Dilaudid 0.4mg IV q3hrs as needed  Oxycodone IR 5mg q4hrs as needed      RLS  Continue Lyrica dose 75mg daily     Anxiety  Benadryl IV q4h as needed for pruritus and anxiety - is effective      Palliative Care   CODE STATUS - DNR CCA DNI   The patient has decision-making capacity  Daughter Xiao is DPOA-HC, document in EMR  Disease modifying model of treatment     7/15/2025  Brie is sleeping at time of assessment, easily arousable.  She has maintained her current pain management regimen: Robaxin scheduled every 8 hours, Lyrica daily, As needed: Benadryl 50 mg IV and Dilaudid 0.4 mg IV every 3 hours (which pt. is consistently receiving) - with effectiveness.  TTE echo completed on Monday, 7/14 - with LVEF 60%, prosthetic mitral and aortic valves with no evidence of infection and normal function.  Micafugin was discontinued on 7/14/25.  ID continues to follow.  As GOC are well established for Brie, Palliative Medicine will sign off and remain available as needed.  Updated primary team.    7/14/2025  Palliative care following mostly for goals of care.  Symptoms appear to be controlled at this time.  Patient was unable to receive dialysis since last week Thursday.  Expect many of the symptoms to resolve once the  patient does receive dialysis.  There are no changes today from a palliative care perspective.  Suspect we will be able to sign off once access is in place and the patient has a solid dialysis access plan.  We will continue to follow.    I spent 28 noncontinuous minutes in the professional and overall care of this patient.     7/13/2025 (maintaining colleagues notes for patient care continuity)  The patient remains on O2 via NC, states that her congestion is better.  He also endorses her pain is well-controlled with the current regimen as well as her pruritus and anxiety.  She states that she is hoping for dialysis tomorrow to help with fluid overload.  There are no changes today from a palliative care perspective.  We will continue to follow.     7/12/2025  Discussion with the patient regarding goals of care.  We did discuss former chrome status, the patient stated that she would like to continue the CODE STATUS of DNR CCA DNI.  We also discussed dialysis options.  She stated that she still plans to consult with a PD nephrologist for consideration for PD access outpatient.  The plan is to continue her treatment medical care.  As far as pain management updates patient states her pain is controlled on her current regimen.  She does state that her anxiety could be managed better is asking for an increase in dose of Benadryl.  I did increase from 25 mg every 3 hours to 50 mg every 4 hours.  Also with complaints of chest congestion.  I did order sodium chloride nebulizer.  Will monitor for effectiveness.      Loree Klein, APRN-CNP           [1] amLODIPine, 5 mg, oral, Daily  aspirin, 81 mg, oral, Daily  atorvastatin, 40 mg, oral, Nightly  calcitriol, 0.5 mcg, oral, Daily  carvedilol, 12.5 mg, oral, BID  cloNIDine, 0.3 mg, oral, q8h [facility]  [START ON 7/18/2025] ergocalciferol, 1.25 mg, oral, Every Friday  ipratropium-albuteroL, 3 mL, nebulization, TID  levETIRAcetam, 750 mg, oral, Nightly  lidocaine, 0.1 mL, subcutaneous,  Once  lidocaine, 1 patch, transdermal, q24h  methocarbamol, 500 mg, oral, q8h KIMBERLY  micafungin, 100 mg, intravenous, q24h  pantoprazole, 40 mg, oral, Daily before breakfast   Or  pantoprazole, 40 mg, intravenous, Daily before breakfast  patiromer calcium sorbitex, 1 packet, oral, Daily  pregabalin, 75 mg, oral, Daily  sodium chloride, 3 mL, nebulization, q6h while awake  [2]    [3] PRN medications: dextrose, dextrose, diphenhydrAMINE, glucagon, glucagon, hydrALAZINE, HYDROmorphone, ipratropium-albuteroL, labetaloL, LORazepam, oxyCODONE, oxygen

## 2025-07-15 NOTE — CARE PLAN
Problem: Pain - Adult  Goal: Verbalizes/displays adequate comfort level or baseline comfort level  Outcome: Progressing     Problem: Safety - Adult  Goal: Free from fall injury  Outcome: Progressing     Problem: Discharge Planning  Goal: Discharge to home or other facility with appropriate resources  Outcome: Progressing     Problem: Chronic Conditions and Co-morbidities  Goal: Patient's chronic conditions and co-morbidity symptoms are monitored and maintained or improved  Outcome: Progressing     Problem: Nutrition  Goal: Nutrient intake appropriate for maintaining nutritional needs  Outcome: Progressing     Problem: Fall/Injury  Goal: Not fall by end of shift  Outcome: Progressing  Goal: Be free from injury by end of the shift  Outcome: Progressing  Goal: Verbalize understanding of personal risk factors for fall in the hospital  Outcome: Progressing  Goal: Verbalize understanding of risk factor reduction measures to prevent injury from fall in the home  Outcome: Progressing  Goal: Use assistive devices by end of the shift  Outcome: Progressing  Goal: Pace activities to prevent fatigue by end of the shift  Outcome: Progressing     Problem: Pain  Goal: Takes deep breaths with improved pain control throughout the shift  Outcome: Progressing  Goal: Turns in bed with improved pain control throughout the shift  Outcome: Progressing  Goal: Walks with improved pain control throughout the shift  Outcome: Progressing  Goal: Performs ADL's with improved pain control throughout shift  Outcome: Progressing  Goal: Participates in PT with improved pain control throughout the shift  Outcome: Progressing  Goal: Free from opioid side effects throughout the shift  Outcome: Progressing  Goal: Free from acute confusion related to pain meds throughout the shift  Outcome: Progressing     Problem: Skin  Goal: Decreased wound size/increased tissue granulation at next dressing change  Outcome: Progressing  Goal: Participates in  plan/prevention/treatment measures  Outcome: Progressing  Goal: Prevent/manage excess moisture  Outcome: Progressing  Goal: Prevent/minimize sheer/friction injuries  Outcome: Progressing  Goal: Promote/optimize nutrition  Outcome: Progressing  Goal: Promote skin healing  Outcome: Progressing  Flowsheets (Taken 7/15/2025 1214)  Promote skin healing:   Assess skin/pad under line(s)/device(s)   Protective dressings over bony prominences   Turn/reposition every 2 hours/use positioning/transfer devices   Ensure correct size (line/device) and apply per  instructions     Problem: Respiratory  Goal: Clear secretions with interventions this shift  Outcome: Progressing  Goal: Minimize anxiety/maximize coping throughout shift  Outcome: Progressing  Goal: Minimal/no exertional discomfort or dyspnea this shift  Outcome: Progressing  Goal: No signs of respiratory distress (eg. Use of accessory muscles. Peds grunting)  Outcome: Progressing

## 2025-07-15 NOTE — PROGRESS NOTES
Batsheva Page is a 49 y.o. female on day 4 of admission presenting with No Principal Problem: There is no principal problem currently on the Problem List. Please update the Problem List and refresh..      Subjective   Laying in bed. Awake alert oriented x3 . Denies any pain or shortness of breath. No complaints.        Objective     Last Recorded Vitals  /77 (BP Location: Left arm, Patient Position: Lying)   Pulse 73   Temp 36.4 °C (97.5 °F) (Temporal)   Resp 13   Wt 69.6 kg (153 lb 7 oz)   SpO2 93%   Intake/Output last 3 Shifts:    Intake/Output Summary (Last 24 hours) at 7/15/2025 1514  Last data filed at 7/15/2025 0900  Gross per 24 hour   Intake 1484 ml   Output 3400 ml   Net -1916 ml       Admission Weight  Weight: 67.1 kg (148 lb) (07/11/25 1327)    Daily Weight  07/15/25 : 69.6 kg (153 lb 7 oz)    Image Results  Electrocardiogram, 12-lead PRN ACS symptoms  Normal sinus rhythm  Nonspecific ST abnormality  Abnormal ECG  No previous ECGs available      Physical Exam  Vitals and nursing note reviewed.   Constitutional:       Appearance: Normal appearance.   HENT:      Head: Normocephalic and atraumatic.      Right Ear: External ear normal.      Left Ear: External ear normal.      Nose: Nose normal.      Mouth/Throat:      Mouth: Mucous membranes are moist.   Eyes:      Extraocular Movements: Extraocular movements intact.      Conjunctiva/sclera: Conjunctivae normal.      Pupils: Pupils are equal, round, and reactive to light.   Cardiovascular:      Rate and Rhythm: Normal rate.      Pulses: Normal pulses.      Heart sounds: Normal heart sounds.   Pulmonary:      Effort: Pulmonary effort is normal.      Breath sounds: Normal breath sounds.   Abdominal:      General: Bowel sounds are normal.      Palpations: Abdomen is soft.   Musculoskeletal:         General: Normal range of motion.      Cervical back: Normal range of motion.   Skin:     General: Skin is warm and dry.      Capillary Refill:  Capillary refill takes less than 2 seconds.      Comments: Diffuse scattered areas of hypopigmentation BL-UE   R-fem HD cath dressing CDI   Neurological:      Mental Status: She is alert and oriented to person, place, and time.         Relevant Results                    This patient has a central line   Reason for the central line remaining today? Dialysis/Hemapheresis and Hemodynamic monitoring            Assessment & Plan  ESRD (end stage renal disease) on dialysis (Multi)    Palliative care encounter    Bacteremia  Blood cultures 7/10/2025 + yeast and Staphylococcus epidermidis  Continue ABX and antifungal per ID  Follow cultures     Displaced HD catheter   ESRD on hemodialysis  S/p replacement 7/14  Renally dose meds, avoid nephrotoxic medications  Reportedly Interested in talking to Dr. Pedersen about peritoneal dialysis options   Nephrology following     Seizure  Resumed home medication  Seizure precautions     HTN  Continue antihypertensives  Monitor BP      CAD  Aspirin/statin     Anxiety/depression  Resumed home medications    DVT/GI  SCD, PPI     Disposition: Home once medically optimized and cleared by all consultants             Shira iLm, APRN-CNP

## 2025-07-15 NOTE — PROGRESS NOTES
"Batsheva Page is a 49 y.o. female on day 4 of admission presenting with No Principal Problem: There is no principal problem currently on the Problem List. Please update the Problem List and refresh..    Subjective   Seen for end-stage kidney disease and dialysis therapy she was dialyzed yesterday admitted with bacteremia with Staph epidermidis and also she has fungemia her dialysis catheter was exchanged through a guidewire she is resting comfortably no new complaints at this time no fever no chills renal dynamically stable       Objective     Physical Exam  Neck:      Vascular: No carotid bruit.   Cardiovascular:      Rate and Rhythm: Normal rate and regular rhythm.      Heart sounds: Murmur (Systolic ejection murmur at the left sternal border) heard.      No friction rub. No gallop.   Pulmonary:      Breath sounds: No wheezing, rhonchi or rales.   Chest:      Chest wall: No tenderness.   Abdominal:      General: There is no distension.      Tenderness: There is no abdominal tenderness. There is no guarding or rebound.   Musculoskeletal:         General: No swelling or tenderness.      Cervical back: Neck supple.      Right lower leg: No edema.      Left lower leg: No edema.   Lymphadenopathy:      Cervical: No cervical adenopathy.         Last Recorded Vitals  Blood pressure 140/76, pulse 84, temperature 37.1 °C (98.8 °F), temperature source Temporal, resp. rate 16, height 1.651 m (5' 5\"), weight 69.6 kg (153 lb 7 oz), SpO2 93%.    Intake/Output last 3 Shifts:  I/O last 3 completed shifts:  In: 1344 (19.3 mL/kg) [P.O.:444; I.V.:400 (5.7 mL/kg); Other:400; IV Piggyback:100]  Out: 3407 (49 mL/kg) [Other:3400; Blood:7]  Weight: 69.6 kg   Current Medications[1]   Relevant Results    Results for orders placed or performed during the hospital encounter of 07/11/25 (from the past 96 hours)   POCT GLUCOSE   Result Value Ref Range    POCT Glucose 100 (H) 74 - 99 mg/dL   Electrocardiogram, 12-lead PRN ACS symptoms "   Result Value Ref Range    Ventricular Rate 62 BPM    Atrial Rate 62 BPM    OR Interval 152 ms    QRS Duration 66 ms    QT Interval 442 ms    QTC Calculation(Bazett) 448 ms    P Axis 63 degrees    R Axis -5 degrees    T Axis 44 degrees    QRS Count 10 beats    Q Onset 221 ms    P Onset 145 ms    P Offset 194 ms    T Offset 442 ms    QTC Fredericia 447 ms   PST Top   Result Value Ref Range    Extra Tube Hold for add-ons.    Lavender Top   Result Value Ref Range    Extra Tube Hold for add-ons.    CBC and Auto Differential   Result Value Ref Range    WBC 8.1 4.4 - 11.3 x10*3/uL    nRBC 0.0 0.0 - 0.0 /100 WBCs    RBC 2.95 (L) 4.00 - 5.20 x10*6/uL    Hemoglobin 9.1 (L) 12.0 - 16.0 g/dL    Hematocrit 29.3 (L) 36.0 - 46.0 %    MCV 99 80 - 100 fL    MCH 30.8 26.0 - 34.0 pg    MCHC 31.1 (L) 32.0 - 36.0 g/dL    RDW 15.9 (H) 11.5 - 14.5 %    Platelets 183 150 - 450 x10*3/uL    Neutrophils % 80.3 40.0 - 80.0 %    Immature Granulocytes %, Automated 0.5 0.0 - 0.9 %    Lymphocytes % 10.0 13.0 - 44.0 %    Monocytes % 4.7 2.0 - 10.0 %    Eosinophils % 3.9 0.0 - 6.0 %    Basophils % 0.6 0.0 - 2.0 %    Neutrophils Absolute 6.53 1.20 - 7.70 x10*3/uL    Immature Granulocytes Absolute, Automated 0.04 0.00 - 0.70 x10*3/uL    Lymphocytes Absolute 0.81 (L) 1.20 - 4.80 x10*3/uL    Monocytes Absolute 0.38 0.10 - 1.00 x10*3/uL    Eosinophils Absolute 0.32 0.00 - 0.70 x10*3/uL    Basophils Absolute 0.05 0.00 - 0.10 x10*3/uL   Comprehensive metabolic panel   Result Value Ref Range    Glucose 82 74 - 99 mg/dL    Sodium 133 (L) 136 - 145 mmol/L    Potassium 5.1 3.5 - 5.3 mmol/L    Chloride 95 (L) 98 - 107 mmol/L    Bicarbonate 22 21 - 32 mmol/L    Anion Gap 21 (H) 10 - 20 mmol/L    Urea Nitrogen 46 (H) 6 - 23 mg/dL    Creatinine 8.41 (H) 0.50 - 1.05 mg/dL    eGFR 5 (L) >60 mL/min/1.73m*2    Calcium 7.1 (L) 8.6 - 10.3 mg/dL    Albumin 3.3 (L) 3.4 - 5.0 g/dL    Alkaline Phosphatase 48 33 - 110 U/L    Total Protein 6.9 6.4 - 8.2 g/dL    AST 11 9 -  39 U/L    Bilirubin, Total 0.6 0.0 - 1.2 mg/dL    ALT 5 (L) 7 - 45 U/L   Protime-INR   Result Value Ref Range    Protime 12.6 9.3 - 12.7 seconds    INR 1.2 0.9 - 1.2   Blood Gas Lactic Acid, Venous   Result Value Ref Range    POCT Lactate, Venous 1.2 0.4 - 2.0 mmol/L   Blood Culture    Specimen: Peripheral Venipuncture; Blood culture   Result Value Ref Range    Blood Culture No growth at 2 days    Magnesium   Result Value Ref Range    Magnesium 1.69 1.60 - 2.40 mg/dL   Vancomycin   Result Value Ref Range    Vancomycin 36.0 (H) 5.0 - 20.0 ug/mL   Blood Culture    Specimen: Peripheral Venipuncture; Blood culture   Result Value Ref Range    Blood Culture No growth at 2 days    Vancomycin   Result Value Ref Range    Vancomycin 32.0 (H) 5.0 - 20.0 ug/mL   CBC   Result Value Ref Range    WBC 7.0 4.4 - 11.3 x10*3/uL    nRBC 0.0 0.0 - 0.0 /100 WBCs    RBC 3.27 (L) 4.00 - 5.20 x10*6/uL    Hemoglobin 10.0 (L) 12.0 - 16.0 g/dL    Hematocrit 33.1 (L) 36.0 - 46.0 %     (H) 80 - 100 fL    MCH 30.6 26.0 - 34.0 pg    MCHC 30.2 (L) 32.0 - 36.0 g/dL    RDW 16.0 (H) 11.5 - 14.5 %    Platelets 183 150 - 450 x10*3/uL   Basic Metabolic Panel   Result Value Ref Range    Glucose 97 74 - 99 mg/dL    Sodium 134 (L) 136 - 145 mmol/L    Potassium 5.0 3.5 - 5.3 mmol/L    Chloride 94 (L) 98 - 107 mmol/L    Bicarbonate 22 21 - 32 mmol/L    Anion Gap 23 (H) 10 - 20 mmol/L    Urea Nitrogen 50 (H) 6 - 23 mg/dL    Creatinine 9.58 (H) 0.50 - 1.05 mg/dL    eGFR 5 (L) >60 mL/min/1.73m*2    Calcium 7.3 (L) 8.6 - 10.3 mg/dL   CBC   Result Value Ref Range    WBC 8.3 4.4 - 11.3 x10*3/uL    nRBC 0.0 0.0 - 0.0 /100 WBCs    RBC 3.02 (L) 4.00 - 5.20 x10*6/uL    Hemoglobin 9.3 (L) 12.0 - 16.0 g/dL    Hematocrit 30.7 (L) 36.0 - 46.0 %     (H) 80 - 100 fL    MCH 30.8 26.0 - 34.0 pg    MCHC 30.3 (L) 32.0 - 36.0 g/dL    RDW 16.4 (H) 11.5 - 14.5 %    Platelets 196 150 - 450 x10*3/uL   Basic Metabolic Panel   Result Value Ref Range    Glucose 89 74 - 99  mg/dL    Sodium 133 (L) 136 - 145 mmol/L    Potassium 5.3 3.5 - 5.3 mmol/L    Chloride 94 (L) 98 - 107 mmol/L    Bicarbonate 22 21 - 32 mmol/L    Anion Gap 22 (H) 10 - 20 mmol/L    Urea Nitrogen 60 (H) 6 - 23 mg/dL    Creatinine 10.67 (H) 0.50 - 1.05 mg/dL    eGFR 4 (L) >60 mL/min/1.73m*2    Calcium 7.4 (L) 8.6 - 10.3 mg/dL   Transthoracic Echo Complete   Result Value Ref Range    AV pk peggy 3.07 m/s    LVOT diam 2.00 cm    AV mn grad 18 mmHg    MV E/A ratio 2.23     Tricuspid annular plane systolic excursion 2.4 cm    LV Biplane EF 59 %    LA vol index A/L 26.6 ml/m2    LV EF 60 %    RVSP 63 mmHg    LVIDd 3.82 cm    AV pk grad 38 mmHg    Aortic Valve Area by Continuity of VTI 1.63 cm2    Aortic Valve Area by Continuity of Peak Velocity 1.57 cm2    LV A4C EF 59.1    POCT GLUCOSE   Result Value Ref Range    POCT Glucose 78 74 - 99 mg/dL   Basic Metabolic Panel   Result Value Ref Range    Glucose 82 74 - 99 mg/dL    Sodium 134 (L) 136 - 145 mmol/L    Potassium 5.0 3.5 - 5.3 mmol/L    Chloride 95 (L) 98 - 107 mmol/L    Bicarbonate 22 21 - 32 mmol/L    Anion Gap 22 (H) 10 - 20 mmol/L    Urea Nitrogen 33 (H) 6 - 23 mg/dL    Creatinine 7.54 (H) 0.50 - 1.05 mg/dL    eGFR 6 (L) >60 mL/min/1.73m*2    Calcium 7.7 (L) 8.6 - 10.3 mg/dL   CBC and Auto Differential   Result Value Ref Range    WBC 10.0 4.4 - 11.3 x10*3/uL    nRBC 0.0 0.0 - 0.0 /100 WBCs    RBC 2.92 (L) 4.00 - 5.20 x10*6/uL    Hemoglobin 9.2 (L) 12.0 - 16.0 g/dL    Hematocrit 29.8 (L) 36.0 - 46.0 %     (H) 80 - 100 fL    MCH 31.5 26.0 - 34.0 pg    MCHC 30.9 (L) 32.0 - 36.0 g/dL    RDW 16.7 (H) 11.5 - 14.5 %    Platelets 212 150 - 450 x10*3/uL    Neutrophils % 89.2 40.0 - 80.0 %    Immature Granulocytes %, Automated 0.4 0.0 - 0.9 %    Lymphocytes % 4.3 13.0 - 44.0 %    Monocytes % 5.6 2.0 - 10.0 %    Eosinophils % 0.1 0.0 - 6.0 %    Basophils % 0.4 0.0 - 2.0 %    Neutrophils Absolute 8.93 (H) 1.20 - 7.70 x10*3/uL    Immature Granulocytes Absolute, Automated  0.04 0.00 - 0.70 x10*3/uL    Lymphocytes Absolute 0.43 (L) 1.20 - 4.80 x10*3/uL    Monocytes Absolute 0.56 0.10 - 1.00 x10*3/uL    Eosinophils Absolute 0.01 0.00 - 0.70 x10*3/uL    Basophils Absolute 0.04 0.00 - 0.10 x10*3/uL   POCT GLUCOSE   Result Value Ref Range    POCT Glucose 82 74 - 99 mg/dL       Assessment/Plan   End-stage kidney disease my plan is to schedule for dialysis tomorrow morning  Bacteremia with Staph epidermidis antibiotics per infectious disease  Fungemia continue antibiotic per infectious disease dialysis catheter was changed  Anemia of chronic kidney disease  Hypertension  History of bacterial endocarditis  Coronary artery disease          Zhou Pedersen MD         [1]   Current Facility-Administered Medications:     amLODIPine (Norvasc) tablet 5 mg, 5 mg, oral, Daily, Noelle Doss, APRN-CNP, 5 mg at 07/15/25 0851    aspirin EC tablet 81 mg, 81 mg, oral, Daily, Shira Lim APRN-CNP, 81 mg at 07/15/25 0852    atorvastatin (Lipitor) tablet 40 mg, 40 mg, oral, Nightly, Noelle Doss, APRN-CNP, 40 mg at 07/14/25 2145    calcitriol (Rocaltrol) capsule 0.5 mcg, 0.5 mcg, oral, Daily, Noelle Doss, APRN-CNP, 0.5 mcg at 07/15/25 0851    carvedilol (Coreg) tablet 12.5 mg, 12.5 mg, oral, BID, Noelle Doss, APRN-CNP, 12.5 mg at 07/15/25 0851    cloNIDine (Catapres) tablet 0.3 mg, 0.3 mg, oral, q8h KIMBERLY, Noelle Doss, APRN-CNP, 0.3 mg at 07/15/25 0718    dextrose 50 % injection 12.5 g, 12.5 g, intravenous, q15 min PRN, Anjana Vee, APRN-CNP    dextrose 50 % injection 25 g, 25 g, intravenous, q15 min PRN, Anjana Vee APRN-CNP    diphenhydrAMINE (BENADryl) injection 50 mg, 50 mg, intravenous, q3h PRN, ANGELA Kincaid, 50 mg at 07/15/25 1024    [START ON 7/18/2025] ergocalciferol (Vitamin D-2) capsule 1.25 mg, 1.25 mg, oral, Every Friday, ANGELA Kincaid    glucagon (Glucagen) injection 1 mg, 1 mg, intramuscular, q15 min PRN, ORI Valentin-CNP     glucagon (Glucagen) injection 1 mg, 1 mg, intramuscular, q15 min PRN, ANGELA Valentin    hydrALAZINE (Apresoline) injection 5 mg, 5 mg, intravenous, q4h PRN, ORI Kincaid-CNP, 5 mg at 07/11/25 2030    HYDROmorphone (Dilaudid) injection 0.4 mg, 0.4 mg, intravenous, q3h PRN, ORI Kincaid-CNP, 0.4 mg at 07/15/25 1023    ipratropium-albuteroL (Duo-Neb) 0.5-2.5 mg/3 mL nebulizer solution 3 mL, 3 mL, nebulization, TID, Chris Lott MD, 3 mL at 07/15/25 0746    ipratropium-albuteroL (Duo-Neb) 0.5-2.5 mg/3 mL nebulizer solution 3 mL, 3 mL, nebulization, q2h PRN, Chris Lott MD    labetaloL (Normodyne,Trandate) injection 5 mg, 5 mg, intravenous, Once PRN, Masoud Serrato MD    levETIRAcetam (Keppra) tablet 750 mg, 750 mg, oral, Nightly, ANGELA Kincaid, 750 mg at 07/14/25 2145    lidocaine (Xylocaine) 10 mg/mL (1 %) injection 0.1 mL, 0.1 mL, subcutaneous, Once, Masoud Serrato MD    lidocaine 4 % patch 1 patch, 1 patch, transdermal, q24h, ORI Kincaid-CNP, 1 patch at 07/13/25 2000    LORazepam (Ativan) tablet 0.25 mg, 0.25 mg, oral, q6h PRN, ANGELA Kincaid    methocarbamol (Robaxin) tablet 500 mg, 500 mg, oral, q8h KIMBERLY, ORI Kincaid-CNP, 500 mg at 07/15/25 0718    micafungin (Mycamine) 100 mg in dextrose 5% 100 mL IV, 100 mg, intravenous, q24h, Cordell Steinberg MD, Stopped at 07/14/25 2310    oxyCODONE (Roxicodone) immediate release tablet 5 mg, 5 mg, oral, q4h PRN, Masoud Serrato MD, 5 mg at 07/12/25 2045    oxygen (O2) therapy, , inhalation, Continuous PRN - O2/gases, Karlo De La Vega MD    pantoprazole (ProtoNix) EC tablet 40 mg, 40 mg, oral, Daily before breakfast, 40 mg at 07/15/25 0857 **OR** pantoprazole (Protonix) injection 40 mg, 40 mg, intravenous, Daily before breakfast, Shira Lim, APRN-CNP    patiromer calcium sorbitex (Veltassa) packet 8.4 g, 1 packet, oral, Daily, Jerrod Pickett MD    pregabalin (Lyrica)  capsule 75 mg, 75 mg, oral, Daily, Noelle Doss, ORI-CNP, 75 mg at 07/15/25 0851    sodium chloride 3 % nebulizer solution 3 mL, 3 mL, nebulization, q6h while awake, ORI Goff-CNP, 3 mL at 07/15/25 0746

## 2025-07-15 NOTE — PROGRESS NOTES
07/15/25 1411   Discharge Planning   Living Arrangements Spouse/significant other   Support Systems Spouse/significant other;Children   Type of Residence Private residence   Who is requesting discharge planning? Provider   Home or Post Acute Services None   Expected Discharge Disposition Home   Does the patient need discharge transport arranged? No     Dialysis catheter changed. Pt to continue IV atb's, dialysis tomorrow.     Discharge plan to return home when medically ready.

## 2025-07-16 ENCOUNTER — TELEPHONE (OUTPATIENT)
Dept: HOME HEALTH SERVICES | Facility: HOME HEALTH | Age: 50
End: 2025-07-16

## 2025-07-16 ENCOUNTER — APPOINTMENT (OUTPATIENT)
Dept: DIALYSIS | Facility: HOSPITAL | Age: 50
End: 2025-07-16
Payer: MEDICARE

## 2025-07-16 LAB
ANION GAP SERPL CALCULATED.3IONS-SCNC: 23 MMOL/L (ref 10–20)
ATRIAL RATE: 62 BPM
BACTERIA BLD CULT: NORMAL
BACTERIA BLD CULT: NORMAL
BASOPHILS # BLD AUTO: 0.05 X10*3/UL (ref 0–0.1)
BASOPHILS NFR BLD AUTO: 0.7 %
BUN SERPL-MCNC: 44 MG/DL (ref 6–23)
CALCIUM SERPL-MCNC: 7.5 MG/DL (ref 8.6–10.3)
CHLORIDE SERPL-SCNC: 94 MMOL/L (ref 98–107)
CO2 SERPL-SCNC: 23 MMOL/L (ref 21–32)
CREAT SERPL-MCNC: 8.78 MG/DL (ref 0.5–1.05)
EGFRCR SERPLBLD CKD-EPI 2021: 5 ML/MIN/1.73M*2
EOSINOPHIL # BLD AUTO: 0.36 X10*3/UL (ref 0–0.7)
EOSINOPHIL NFR BLD AUTO: 4.9 %
ERYTHROCYTE [DISTWIDTH] IN BLOOD BY AUTOMATED COUNT: 16.2 % (ref 11.5–14.5)
GLUCOSE BLD MANUAL STRIP-MCNC: 91 MG/DL (ref 74–99)
GLUCOSE SERPL-MCNC: 77 MG/DL (ref 74–99)
HCT VFR BLD AUTO: 30 % (ref 36–46)
HGB BLD-MCNC: 9.5 G/DL (ref 12–16)
IMM GRANULOCYTES # BLD AUTO: 0.02 X10*3/UL (ref 0–0.7)
IMM GRANULOCYTES NFR BLD AUTO: 0.3 % (ref 0–0.9)
LYMPHOCYTES # BLD AUTO: 0.72 X10*3/UL (ref 1.2–4.8)
LYMPHOCYTES NFR BLD AUTO: 9.8 %
MCH RBC QN AUTO: 31.1 PG (ref 26–34)
MCHC RBC AUTO-ENTMCNC: 31.7 G/DL (ref 32–36)
MCV RBC AUTO: 98 FL (ref 80–100)
MONOCYTES # BLD AUTO: 0.66 X10*3/UL (ref 0.1–1)
MONOCYTES NFR BLD AUTO: 9 %
NEUTROPHILS # BLD AUTO: 5.51 X10*3/UL (ref 1.2–7.7)
NEUTROPHILS NFR BLD AUTO: 75.3 %
NRBC BLD-RTO: 0 /100 WBCS (ref 0–0)
P AXIS: 63 DEGREES
P OFFSET: 194 MS
P ONSET: 145 MS
PLATELET # BLD AUTO: 193 X10*3/UL (ref 150–450)
POTASSIUM SERPL-SCNC: 5.1 MMOL/L (ref 3.5–5.3)
PR INTERVAL: 152 MS
Q ONSET: 221 MS
QRS COUNT: 10 BEATS
QRS DURATION: 66 MS
QT INTERVAL: 442 MS
QTC CALCULATION(BAZETT): 448 MS
QTC FREDERICIA: 447 MS
R AXIS: -5 DEGREES
RBC # BLD AUTO: 3.05 X10*6/UL (ref 4–5.2)
SODIUM SERPL-SCNC: 135 MMOL/L (ref 136–145)
T AXIS: 44 DEGREES
T OFFSET: 442 MS
VENTRICULAR RATE: 62 BPM
WBC # BLD AUTO: 7.3 X10*3/UL (ref 4.4–11.3)

## 2025-07-16 PROCEDURE — 2500000004 HC RX 250 GENERAL PHARMACY W/ HCPCS (ALT 636 FOR OP/ED)

## 2025-07-16 PROCEDURE — 99232 SBSQ HOSP IP/OBS MODERATE 35: CPT

## 2025-07-16 PROCEDURE — 94640 AIRWAY INHALATION TREATMENT: CPT

## 2025-07-16 PROCEDURE — 2500000002 HC RX 250 W HCPCS SELF ADMINISTERED DRUGS (ALT 637 FOR MEDICARE OP, ALT 636 FOR OP/ED): Performed by: RADIOLOGY

## 2025-07-16 PROCEDURE — 2500000004 HC RX 250 GENERAL PHARMACY W/ HCPCS (ALT 636 FOR OP/ED): Performed by: NURSE PRACTITIONER

## 2025-07-16 PROCEDURE — 2500000001 HC RX 250 WO HCPCS SELF ADMINISTERED DRUGS (ALT 637 FOR MEDICARE OP)

## 2025-07-16 PROCEDURE — 2500000005 HC RX 250 GENERAL PHARMACY W/O HCPCS

## 2025-07-16 PROCEDURE — 82374 ASSAY BLOOD CARBON DIOXIDE: CPT

## 2025-07-16 PROCEDURE — 85025 COMPLETE CBC W/AUTO DIFF WBC: CPT

## 2025-07-16 PROCEDURE — 36415 COLL VENOUS BLD VENIPUNCTURE: CPT

## 2025-07-16 PROCEDURE — 2500000001 HC RX 250 WO HCPCS SELF ADMINISTERED DRUGS (ALT 637 FOR MEDICARE OP): Performed by: NURSE PRACTITIONER

## 2025-07-16 PROCEDURE — 8010000001 HC DIALYSIS - HEMODIALYSIS PER DAY

## 2025-07-16 PROCEDURE — 2500000004 HC RX 250 GENERAL PHARMACY W/ HCPCS (ALT 636 FOR OP/ED): Performed by: INTERNAL MEDICINE

## 2025-07-16 PROCEDURE — 1200000002 HC GENERAL ROOM WITH TELEMETRY DAILY

## 2025-07-16 PROCEDURE — 82947 ASSAY GLUCOSE BLOOD QUANT: CPT

## 2025-07-16 PROCEDURE — 9420000001 HC RT PATIENT EDUCATION 5 MIN

## 2025-07-16 RX ORDER — HEPARIN SODIUM 1000 [USP'U]/ML
1000 INJECTION, SOLUTION INTRAVENOUS; SUBCUTANEOUS
Status: DISCONTINUED | OUTPATIENT
Start: 2025-07-16 | End: 2025-07-17 | Stop reason: HOSPADM

## 2025-07-16 RX ORDER — OXYCODONE HYDROCHLORIDE 5 MG/1
5 TABLET ORAL EVERY 4 HOURS PRN
Refills: 0 | Status: DISCONTINUED | OUTPATIENT
Start: 2025-07-16 | End: 2025-07-17

## 2025-07-16 RX ORDER — ACETAMINOPHEN 325 MG/1
650 TABLET ORAL EVERY 6 HOURS PRN
Status: DISCONTINUED | OUTPATIENT
Start: 2025-07-16 | End: 2025-07-17 | Stop reason: HOSPADM

## 2025-07-16 RX ADMIN — CLONIDINE HYDROCHLORIDE 0.3 MG: 0.2 TABLET ORAL at 06:20

## 2025-07-16 RX ADMIN — AMLODIPINE BESYLATE 5 MG: 5 TABLET ORAL at 11:36

## 2025-07-16 RX ADMIN — DIPHENHYDRAMINE HYDROCHLORIDE 50 MG: 50 INJECTION, SOLUTION INTRAMUSCULAR; INTRAVENOUS at 23:40

## 2025-07-16 RX ADMIN — DIPHENHYDRAMINE HYDROCHLORIDE 50 MG: 50 INJECTION, SOLUTION INTRAMUSCULAR; INTRAVENOUS at 15:00

## 2025-07-16 RX ADMIN — ATORVASTATIN CALCIUM 40 MG: 40 TABLET, FILM COATED ORAL at 22:13

## 2025-07-16 RX ADMIN — SODIUM CHLORIDE SOLN NEBU 3% 3 ML: 3 NEBU SOLN at 19:04

## 2025-07-16 RX ADMIN — HYDROMORPHONE HYDROCHLORIDE 0.4 MG: 0.5 INJECTION, SOLUTION INTRAMUSCULAR; INTRAVENOUS; SUBCUTANEOUS at 06:21

## 2025-07-16 RX ADMIN — IPRATROPIUM BROMIDE AND ALBUTEROL SULFATE 3 ML: 2.5; .5 SOLUTION RESPIRATORY (INHALATION) at 13:28

## 2025-07-16 RX ADMIN — IPRATROPIUM BROMIDE AND ALBUTEROL SULFATE 3 ML: 2.5; .5 SOLUTION RESPIRATORY (INHALATION) at 19:04

## 2025-07-16 RX ADMIN — MICAFUNGIN SODIUM 100 MG: 100 INJECTION, POWDER, LYOPHILIZED, FOR SOLUTION INTRAVENOUS at 22:13

## 2025-07-16 RX ADMIN — CLONIDINE HYDROCHLORIDE 0.3 MG: 0.2 TABLET ORAL at 22:13

## 2025-07-16 RX ADMIN — HYDROMORPHONE HYDROCHLORIDE 0.4 MG: 0.5 INJECTION, SOLUTION INTRAMUSCULAR; INTRAVENOUS; SUBCUTANEOUS at 20:00

## 2025-07-16 RX ADMIN — CARVEDILOL 12.5 MG: 12.5 TABLET, FILM COATED ORAL at 11:36

## 2025-07-16 RX ADMIN — HYDROMORPHONE HYDROCHLORIDE 0.4 MG: 0.5 INJECTION, SOLUTION INTRAMUSCULAR; INTRAVENOUS; SUBCUTANEOUS at 15:00

## 2025-07-16 RX ADMIN — DIPHENHYDRAMINE HYDROCHLORIDE 50 MG: 50 INJECTION, SOLUTION INTRAMUSCULAR; INTRAVENOUS at 03:24

## 2025-07-16 RX ADMIN — CALCITRIOL CAPSULES 0.25 MCG 0.5 MCG: 0.25 CAPSULE ORAL at 11:36

## 2025-07-16 RX ADMIN — HYDROMORPHONE HYDROCHLORIDE 0.4 MG: 0.5 INJECTION, SOLUTION INTRAMUSCULAR; INTRAVENOUS; SUBCUTANEOUS at 23:40

## 2025-07-16 RX ADMIN — HEPARIN SODIUM 2600 UNITS: 1000 INJECTION, SOLUTION INTRAVENOUS; SUBCUTANEOUS at 10:33

## 2025-07-16 RX ADMIN — PREGABALIN 75 MG: 75 CAPSULE ORAL at 11:36

## 2025-07-16 RX ADMIN — METHOCARBAMOL 500 MG: 500 TABLET ORAL at 22:13

## 2025-07-16 RX ADMIN — ACETAMINOPHEN 650 MG: 325 TABLET ORAL at 16:53

## 2025-07-16 RX ADMIN — SODIUM CHLORIDE SOLN NEBU 3% 3 ML: 3 NEBU SOLN at 13:28

## 2025-07-16 RX ADMIN — LEVETIRACETAM 750 MG: 250 TABLET, FILM COATED ORAL at 22:13

## 2025-07-16 RX ADMIN — DIPHENHYDRAMINE HYDROCHLORIDE 50 MG: 50 INJECTION, SOLUTION INTRAMUSCULAR; INTRAVENOUS at 20:00

## 2025-07-16 RX ADMIN — CARVEDILOL 12.5 MG: 12.5 TABLET, FILM COATED ORAL at 22:13

## 2025-07-16 RX ADMIN — DIPHENHYDRAMINE HYDROCHLORIDE 50 MG: 50 INJECTION, SOLUTION INTRAMUSCULAR; INTRAVENOUS at 06:22

## 2025-07-16 RX ADMIN — ASPIRIN 81 MG: 81 TABLET, DELAYED RELEASE ORAL at 11:36

## 2025-07-16 RX ADMIN — METHOCARBAMOL 500 MG: 500 TABLET ORAL at 15:00

## 2025-07-16 RX ADMIN — HYDROMORPHONE HYDROCHLORIDE 0.4 MG: 0.5 INJECTION, SOLUTION INTRAMUSCULAR; INTRAVENOUS; SUBCUTANEOUS at 03:24

## 2025-07-16 RX ADMIN — METHOCARBAMOL 500 MG: 500 TABLET ORAL at 06:23

## 2025-07-16 RX ADMIN — PANTOPRAZOLE SODIUM 40 MG: 40 TABLET, DELAYED RELEASE ORAL at 06:20

## 2025-07-16 RX ADMIN — DIPHENHYDRAMINE HYDROCHLORIDE 50 MG: 50 INJECTION, SOLUTION INTRAMUSCULAR; INTRAVENOUS at 09:29

## 2025-07-16 RX ADMIN — SODIUM CHLORIDE 6.25 MG: 9 INJECTION, SOLUTION INTRAVENOUS at 17:55

## 2025-07-16 RX ADMIN — HYDROMORPHONE HYDROCHLORIDE 0.4 MG: 0.5 INJECTION, SOLUTION INTRAMUSCULAR; INTRAVENOUS; SUBCUTANEOUS at 11:35

## 2025-07-16 RX ADMIN — CLONIDINE HYDROCHLORIDE 0.3 MG: 0.2 TABLET ORAL at 15:00

## 2025-07-16 ASSESSMENT — PAIN - FUNCTIONAL ASSESSMENT
PAIN_FUNCTIONAL_ASSESSMENT: 0-10

## 2025-07-16 ASSESSMENT — PAIN DESCRIPTION - DESCRIPTORS
DESCRIPTORS: SHARP
DESCRIPTORS: ACHING;STABBING
DESCRIPTORS: ACHING
DESCRIPTORS: STABBING;ACHING;BURNING
DESCRIPTORS: SHOOTING
DESCRIPTORS: SHARP
DESCRIPTORS: ACHING;SHARP
DESCRIPTORS: SHARP
DESCRIPTORS: SHARP

## 2025-07-16 ASSESSMENT — COGNITIVE AND FUNCTIONAL STATUS - GENERAL
CLIMB 3 TO 5 STEPS WITH RAILING: A LITTLE
MOBILITY SCORE: 21
DAILY ACTIVITIY SCORE: 24
WALKING IN HOSPITAL ROOM: A LITTLE
STANDING UP FROM CHAIR USING ARMS: A LITTLE

## 2025-07-16 ASSESSMENT — PAIN SCALES - GENERAL
PAINLEVEL_OUTOF10: 7
PAINLEVEL_OUTOF10: 10 - WORST POSSIBLE PAIN
PAINLEVEL_OUTOF10: 2
PAINLEVEL_OUTOF10: 10 - WORST POSSIBLE PAIN
PAINLEVEL_OUTOF10: 8
PAINLEVEL_OUTOF10: 10 - WORST POSSIBLE PAIN
PAINLEVEL_OUTOF10: 8
PAINLEVEL_OUTOF10: 10 - WORST POSSIBLE PAIN
PAINLEVEL_OUTOF10: 8

## 2025-07-16 ASSESSMENT — PAIN DESCRIPTION - LOCATION
LOCATION: GROIN
LOCATION: GENERALIZED
LOCATION: GENERALIZED
LOCATION: GROIN
LOCATION: GROIN

## 2025-07-16 ASSESSMENT — PAIN DESCRIPTION - ORIENTATION
ORIENTATION: RIGHT

## 2025-07-16 NOTE — PROGRESS NOTES
"Batsheva Page is a 49 y.o. female on day 5 of admission presenting with No Principal Problem: There is no principal problem currently on the Problem List. Please update the Problem List and refresh..    Subjective   Interval History:   Patient seen while undergoing dialysis  Afebrile, reports intermittent chills  Denies shortness of breath or chest pain  Denies nausea, vomiting, diarrhea, abdominal pain    Review of Systems   All other systems reviewed and are negative.      Objective   Range of Vitals (last 24 hours)  Heart Rate:  [69-78]   Temp:  [36.7 °C (98.1 °F)-37 °C (98.6 °F)]   Resp:  [15-19]   BP: (136-177)/(58-88)   Height:  [165.1 cm (5' 5\")]   SpO2:  [92 %-97 %]   Daily Weight  07/15/25 : 69.6 kg (153 lb 7 oz)    Body mass index is 25.53 kg/m².    Physical Exam  Constitutional:       Appearance: Normal appearance.   HENT:      Head: Normocephalic and atraumatic.      Right Ear: External ear normal.      Left Ear: External ear normal.      Nose: Nose normal.   Eyes:      General: No scleral icterus.     Extraocular Movements: Extraocular movements intact.      Conjunctiva/sclera: Conjunctivae normal.   Cardiovascular:      Rate and Rhythm: Normal rate and regular rhythm.      Heart sounds: Normal heart sounds, S1 normal and S2 normal.   Pulmonary:      Effort: Pulmonary effort is normal.      Breath sounds: Decreased breath sounds present.   Abdominal:      General: Bowel sounds are normal.      Palpations: Abdomen is soft.      Tenderness: There is no abdominal tenderness.   Musculoskeletal:      Cervical back: Normal range of motion and neck supple.      Right lower leg: No edema.      Left lower leg: No edema.   Skin:     General: Skin is warm and dry.   Neurological:      Mental Status: She is alert.   Psychiatric:         Behavior: Behavior normal. Behavior is cooperative.     Antibiotics  micafungin - 100 mg/100 mL  micafungin (Mycamine)  mg in 100 mL D5W (VBS)  VANCOMYCIN 5 MG/ML IN NS " LOCK    Relevant Results  Labs  Results from last 72 hours   Lab Units 07/16/25  0523 07/15/25  0744 07/14/25  0423   WBC AUTO x10*3/uL 7.3 10.0 8.3   HEMOGLOBIN g/dL 9.5* 9.2* 9.3*   HEMATOCRIT % 30.0* 29.8* 30.7*   PLATELETS AUTO x10*3/uL 193 212 196   NEUTROS PCT AUTO % 75.3 89.2  --    LYMPHS PCT AUTO % 9.8 4.3  --    MONOS PCT AUTO % 9.0 5.6  --    EOS PCT AUTO % 4.9 0.1  --      Results from last 72 hours   Lab Units 07/16/25  0523 07/15/25  0744 07/14/25  0423   SODIUM mmol/L 135* 134* 133*   POTASSIUM mmol/L 5.1 5.0 5.3   CHLORIDE mmol/L 94* 95* 94*   CO2 mmol/L 23 22 22   BUN mg/dL 44* 33* 60*   CREATININE mg/dL 8.78* 7.54* 10.67*   GLUCOSE mg/dL 77 82 89   CALCIUM mg/dL 7.5* 7.7* 7.4*   ANION GAP mmol/L 23* 22* 22*   EGFR mL/min/1.73m*2 5* 6* 4*         Estimated Creatinine Clearance: 7.5 mL/min (A) (by C-G formula based on SCr of 8.78 mg/dL (H)).  C-Reactive Protein   Date Value Ref Range Status   12/03/2024 27.39 (H) <1.00 mg/dL Final     CRP   Date Value Ref Range Status   12/25/2022 9.90 (A) mg/dL Final     Comment:     REF VALUE  < 1.00     12/23/2022 23.46 (A) mg/dL Final     Comment:     REF VALUE  < 1.00       Microbiology  Susceptibility data from last 14 days.  Collected Specimen Info Organism   07/10/25 Blood culture from Arterial Line Staphylococcus epidermidis     Candida (Nakaseomyces) glabrata   07/10/25 Blood culture from Arterial Line Candida (Nakaseomyces) glabrata       Imaging  Electrocardiogram, 12-lead PRN ACS symptoms  Result Date: 7/16/2025  Normal sinus rhythm Nonspecific ST abnormality Abnormal ECG No previous ECGs available Confirmed by Willi Anderson ([id number]) on 7/16/2025 10:04:23 AM    Transthoracic Echo Complete  Result Date: 7/14/2025           [place] [id number]            Phone [phone] TRANSTHORACIC ECHOCARDIOGRAM REPORT Patient Name:       RITA TEMPLE Reading Physician:    [id number] Willi                                                                 Justin SANTOS Study Date:         7/14/2025            Ordering Provider:    [id number] XANDER MONTEGORGEKISHAN MRN/PID:            [id number]             Fellow: Accession#:         [id number]         Nurse: Date of Birth/Age:  1975 / 49      Sonographer:          Darcie hirsch RDCS Gender Assigned at  F                    Additional Staff: Birth: Height:             195.58 cm            Admit Date: Weight:             79.38 kg             Admission Status:     Inpatient -                                                                Routine BSA / BMI:          2.11 m2 / 20.75      Department Location:  [facility]                     kg/m2 Blood Pressure: 146 /74 mmHg Study Type:    TRANSTHORACIC ECHO (TTE) COMPLETE Diagnosis/ICD: Acute and subacute infective endocarditis-I33.0 Indication:    Chronic bacterial endocarditis CPT Codes:     Echo Complete w Full Doppler-[id number] Patient History: Valve Disorders:   Aortic Valve Replacement, Mitral Valve Replacement and                    Tricuspid Valve Repair. Pertinent History: CAD and HTN. Study Detail: The following Echo studies were performed: 2D, M-Mode, Doppler and               color flow.  PHYSICIAN INTERPRETATION: Left Ventricle: Left ventricular ejection fraction is normal by visual estimate at 60%. There are no regional wall motion abnormalities. The left ventricular cavity size is normal. There is mild increased septal and mildly increased posterior left ventricular wall thickness. There is left ventricular concentric remodeling. Spectral Doppler shows a Grade I (impaired relaxation pattern) of left ventricular diastolic filling with normal left atrial filling pressure. Left Atrium: The left atrial size is moderately dilated. Right Ventricle: The right ventricle is mildly enlarged. There is normal right  ventricular global systolic function. Right Atrium: The right atrial size is mild to moderately dilated. Aortic Valve: There is a prosthetic aortic valve present. The aortic valve area by VTI is 1.63 cmï¿½ with a peak velocity of 3.07 m/s. The peak and mean gradients are 38 mmHg and 18 mmHg, respectively, with a dimensionless index of 0.52. Echo findings are consistent with normal aortic valve prosthesis structure and function. There is no evidence of aortic valve regurgitation. Mitral Valve: There is a prosthetic mitral valve present. The doppler estimated peak and mean diastolic gradients are 19 mmHg and 6 mmHg, respectively. There is a [employer] [place] bioprosthetic type mitral valve prosthesis with a 27 mm reported size. The peak and mean gradients are 18.5 mmHg and 6 mmHg respectively. Echo findings are consistent with normal mitral valve prosthesis structure and function. There is no evidence of mitral valve regurgitation. The E Vmax is 1.83 m/s. Tricuspid Valve: Status post tricuspid valve repair. The doppler estimated peak and mean diastolic gradients are 15.2 mmHg and 6.0 mmHg, respectively. No evidence of tricuspid regurgitation. The Doppler estimated right ventricular systolic pressure (RVSP) is moderately elevated at 63 mmHg. Pulmonic Valve: The pulmonic valve is not well visualized. There is trace pulmonic valve regurgitation. Pericardium: No pericardial effusion noted. Aorta: The aortic root was not well visualized. Systemic Veins: The inferior vena cava appears normal in size, IVC inspiratory collapse is not well visualized. In comparison to the previous echocardiogram(s): Compared with study dated 12/6/2024,.  CONCLUSIONS:  1. Left ventricular ejection fraction is normal by visual estimate at 60%.  2. Spectral Doppler shows a Grade I (impaired relaxation pattern) of left ventricular diastolic filling with normal left atrial filling pressure.  3. There is normal right ventricular global systolic function.  4.  Mildly enlarged right ventricle.  5. The left atrial size is moderately dilated.  6. The right atrial size is mild to moderately dilated.  7. There is a [employer] Devonte bioprosthetic type mitral valve prosthesis with a 27 mm reported size. The peak and mean gradients are 18.5 mmHg and 6 mmHg respectively.  8. Status post tricuspid valve repair.  9. The Doppler estimated RVSP is moderately elevated at 63 mmHg. 10. Echo findings are consistent with normal mitral valve prosthesis structure and function. 11. Normal aortic valve prosthesis structure and function. QUANTITATIVE DATA SUMMARY:  2D MEASUREMENTS:             Normal Ranges: LAs:             2.90 cm     (2.7-4.0cm) IVSd:            1.00 cm     (0.6-1.1cm) LVPWd:           1.02 cm     (0.6-1.1cm) LVIDd:           3.82 cm     (3.9-5.9cm) LVIDs:           2.60 cm LV Mass Index:   56.8 g/m2 LVEDV Index:     40.25 ml/m2 LV % FS          31.9 %  LEFT ATRIUM:                  Normal Ranges: LA Vol A4C:        37.9 ml    (22+/-6mL/m2) LA Vol A2C:        78.7 ml LA Vol BP:         56.2 ml LA Vol Index A4C:  17.9ml/m2 LA Vol Index A2C:  37.2 ml/m2 LA Vol Index BP:   26.6 ml/m2 LA Area A4C:       15.2 cm2 LA Area A2C:       21.3 cm2 LA Major Axis A4C: 5.2 cm LA Major Axis A2C: 4.9 cm LA Vol A4C:        34.6 ml LA Vol A2C:        72.3 ml LA Vol Index BSA:  25.3 ml/m2  RIGHT ATRIUM:                 Normal Ranges: RA Vol A4C:        38.4 ml    (8.3-19.5ml) RA Vol Index A4C:  18.2 ml/m2 RA Area A4C:       13.8 cm2 RA Major Axis A4C: 4.2 cm  M-MODE MEASUREMENTS:         Normal Ranges: Ao Root:             2.60 cm (2.0-3.7cm) LAs:                 3.40 cm (2.7-4.0cm)  AORTA MEASUREMENTS:         Normal Ranges: Asc Ao, d:          3.10 cm (2.1-3.4cm)  LV SYSTOLIC FUNCTION:                      Normal Ranges: EF-A4C View:    59 % (>=55%) EF-A2C View:    59 % EF-Biplane:     59 % EF-Visual:      60 % LV EF Reported: 60 %  LV DIASTOLIC FUNCTION:             Normal Ranges: MV Peak E:              1.83 m/s    (0.7-1.2 m/s) MV Peak A:             0.82 m/s    (0.42-0.7 m/s) E/A Ratio:             2.23        (1.0-2.2) PulmV Sys Jamarcus:         39.80 cm/s PulmV Martinez Jamarcus:        36.00 cm/s PulmV S/D Jamarcus:         1.10 PulmV A Revs Jamarcus:      19.70 cm/s PulmV A Revs Dur:      151.00 msec  MITRAL VALVE:           Normal Ranges: MV Vmax:      2.15 m/s  (<=1.3m/s) MV peak P.5 mmHg (<5mmHg) MV mean P.7 mmHg  (<48mmHg) MV DT:        296 msec  (150-240msec)  AORTIC VALVE:                      Normal Ranges: AoV Vmax:                3.07 m/s  (<=1.7m/s) AoV Peak P.7 mmHg (<20mmHg) AoV Mean P.0 mmHg (1.7-11.5mmHg) LVOT Max Jamarcus:            1.53 m/s  (<=1.1m/s) AoV VTI:                 65.90 cm  (18-25cm) LVOT VTI:                34.20 cm LVOT Diameter:           2.00 cm   (1.8-2.4cm) AoV Area, VTI:           1.63 cm2  (2.5-5.5cm2) AoV Area,Vmax:           1.57 cm2  (2.5-4.5cm2) AoV Dimensionless Index: 0.52  RIGHT VENTRICLE: RV Basal 3.22 cm RV Mid   2.62 cm RV Major 8.9 cm TAPSE:   24.0 mm  TRICUSPID VALVE/RVSP:           Normal Ranges: Peak TR Velocity:     3.87 m/s TV Vmax               1.95 m/s Est. RA Pressure:     3 mmHg RV Syst Pressure:     63 mmHg   (< 30mmHg) TV mean P.0 mmHg TV Peak PG:           15.2 mmHg TV VTI:               57.20 cm IVC Diam:             1.35 cm  PULMONIC VALVE:           Normal Ranges: PV Max Jamarcus:     2.3 m/s   (0.6-0.9m/s) PV Max P.5 mmHg  PULMONARY VEINS: PulmV A Revs Dur: 151.00 msec PulmV A Revs Jamarcus: 19.70 cm/s PulmV Martinez Jamarcus:   36.00 cm/s PulmV S/D Jamarcus:    1.10 PulmV Sys Jamarcus:    39.80 cm/s  [id number] Willi Anderson MD Electronically signed on 2025 at 11:52:47 AM  ** Final **     IR CVC exchange  Result Date: 7/10/2025  Interpreted By:  Chris Lott, STUDY: IR CVC EXCHANGE; 7/3/2025 4:17 pm   INDICATION: End-stage renal disease. Externalization of cuff of tunneled central venous catheter in right groin.   COMPARISON:  None   ACCESSION NUMBER(S): [id number]   ORDERING CLINICIAN: SADIE BAUTISTA   TECHNIQUE: Exchange of tunneled central venous catheter without port. Fluoroscopy time 0.1 minutes; dose 0.9 mGy air kerma.   FINDINGS: Informed consent obtained. Patient positioned supine and connected to physiologic monitoring. Intravenous sedation provided by anesthesiology. All elements of maximal sterile barrier were utilized including cap, mask, sterile gown, sterile gloves, large sterile drape, hand scrub, 2% chlorhexidine for cleaning the right groin-upper thigh and indwelling catheter. Skin and subcutaneous tract around insertion site anesthetized with lidocaine. Using blunt dissection as needed, the indwelling catheter was removed over a guidewire. A new 14.5 French x 44 cm double-lumen cuffed catheter ([employer] Wear[employer]) was advanced over the wire with tip positioned near inferior cavoatrial junction. Both lumens aspirated and flushed freely, locked with heparin. Catheter secured and covered with dressing. Patient tolerated procedure well.       Exchange of tunneled central venous hemodialysis catheter. The catheter is ready for use.     Signed by: Chris Lott 7/10/2025 11:07 AM Dictation workstation:   [id number]        Assessment/Plan   Fungemia-most likely line related-s/p removal of right thigh hemodialysis catheter replacement over guidewire, positive blood culture on 7/10/2025, blood culture on 7/12/2025, pending  Positive blood culture for Staphylococcus epidermidis-contaminant versus bloodstream infection-patient has history of endocarditis-negative transthoracic echocardiogram     Continue IV micafungin  Follow-up repeat blood cultures-7/12/2025  Supportive care  Interventional radiology consult-placement of central line for IV micafungin-previously discussed with Dr. Lott (Per Dr Steinberg 7/15/25)  Monitor temperature and WBC  Long-term plan is 14 days of micafungin-7/24/2025  Weekly CBC with differential, CMP while on  antibiotic therapy  Ideally, patient should have ophthalmologic evaluation to rule out endophthalmitis-can be done postdischarge as outpatient          This is a complex infectious disease issue and the following was performed today (for more details please see the above note): Management decisions reflecting the added complexity (e.g., changes in antimicrobial therapy, infection control strategies).     Neisha Menard, APRN-CNP

## 2025-07-16 NOTE — TELEPHONE ENCOUNTER
Infusion Ins Check - 7/16/2025    This patient has [employer] Medicare (Gold Plus HMO) with Part D Benefits  and secondary coverage with [place] Medicaid ([facility] Only)     Part D with secondary coverage  (Part D Copays Due)-  Micafungin 100mg IV Q 24  Total estimated costs: $0.00/week  Medication Co-Pay Amount: $ 0.00 (1 week supply)  The Part D co-pay amount is an ESTIMATE and can vary based on where the patient is in the Medicare out-of-pocket(OOP).    ([employer] Medicare (HMO)- Per Anna (Services/Supply Coverage)  Deductible:  $ 257.00 (met), once met 100% coverage.  Out-of-pocket: $ 9,350.00 (not met).  Per Anna Amount: $0.00 ( 1 week supply) *Calculated  based on deductible being met - this is an ESTIMATE*     Skilled Nursing: covered by Medicare if patient is Homebound    ** Unsuccessful call to the patient, unable to leave a voicemail as her messages are full **        Neha Saucedo, [facility]  Infusion  II

## 2025-07-16 NOTE — CARE PLAN
Problem: Respiratory  Goal: Minimal/no exertional discomfort or dyspnea this shift  7/16/2025 1910 by Nasrin Ortez, RRT  Outcome: Progressing  7/16/2025 1910 by Nasrin Ortez, RRT  Outcome: Progressing  Goal: No signs of respiratory distress (eg. Use of accessory muscles. Peds grunting)  7/16/2025 1910 by Nasrin Ortez, RRT  Outcome: Progressing  7/16/2025 1910 by Nasrin Ortez, RRT  Outcome: Progressing

## 2025-07-16 NOTE — PRE-PROCEDURE NOTE
Report from Sending RN:    Report From: Kavya Rowe RN  Recent Surgery of Procedure: No  Baseline Level of Consciousness (LOC): x4  Oxygen Use: Yes  Type: 2l nc  Diabetic: No  Last BP Med Given Day of Dialysis: see mar  Last Pain Med Given: see mar  Lab Tests to be Obtained with Dialysis: No  Blood Transfusion to be Given During Dialysis: No  Available IV Access: Yes  Medications to be Administered During Dialysis: No  Continuous IV Infusion Running: No  Restraints on Currently or in the Last 24 Hours: No  Hand-Off Communication: Patient ready for HD  Dialysis Catheter Dressing: will assess upon arrival  Last Dressing Change: will assess upon arrival

## 2025-07-16 NOTE — PROCEDURES
Vascular Access Team Procedure Note     Visit Date: 7/16/2025      Patient Name: Batsheva Page         MRN: [id number]             Procedure: Pt has a R femoral dialysis catheter, under drsg that is intact there is a moderate amt of dried blood (dated 7/14 when it was placed). I will change drsg. Drsg has been changed using sterile technique, site without any redness, swelling or drainage.                           Yesenia Damico RN  7/16/2025  4:37 PM

## 2025-07-16 NOTE — PROGRESS NOTES
Batsheva Page is a 49 y.o. female on day 5 of admission presenting with No Principal Problem: There is no principal problem currently on the Problem List. Please update the Problem List and refresh..      Subjective   Laying in bed after HD. Somewhat drowsy but easily awakened oriented x3 . Denies any pain or shortness of breath at the moment. No complaints.        Objective     Last Recorded Vitals  /81 (BP Location: Left arm, Patient Position: Lying) Comment: RN notified  Pulse 78   Temp 36.7 °C (98.1 °F) (Oral)   Resp 19   Wt 69.6 kg (153 lb 7 oz)   SpO2 93%   Intake/Output last 3 Shifts:    Intake/Output Summary (Last 24 hours) at 7/16/2025 1505  Last data filed at 7/16/2025 1216  Gross per 24 hour   Intake 1600 ml   Output 4800 ml   Net -3200 ml       Admission Weight  Weight: 67.1 kg (148 lb) (07/11/25 1327)    Daily Weight  07/15/25 : 69.6 kg (153 lb 7 oz)    Image Results  IR CVC exchange  Narrative: Interpreted By:  Chris Lott,   STUDY:  IR CVC EXCHANGE; 7/11/2025 5:42 pm      INDICATION:  Tunneled dialysis catheter cuff out. Fungemia      COMPARISON:  None      ACCESSION NUMBER(S):  [id number]      ORDERING CLINICIAN:  PAT BRYAN      TECHNIQUE:  Exchange of tunneled central venous catheter without port.      FINDINGS:  Informed consent obtained. Patient positioned supine and connected to  physiologic monitoring. Intravenous sedation provided by  anesthesiology.. All elements of maximal sterile barrier were  utilized including cap, mask, sterile gown, sterile gloves, large  sterile drape, hand scrub, 2% chlorhexidine for cleaning the right  chest wall and indwelling catheter. Skin and subcutaneous tract  around insertion site anesthetized with lidocaine. Indwelling right  upper thigh catheter was removed over Amplatz wire. Given patient's  history of very poor remaining vascular access, decision made to  maintain access at current right common femoral site by advancement  of a  12 [place] sheath over the wire. The sheath was secured with a  suture and covered with a dressing.      Impression: Over-the-wire removal of tunneled central venous catheter and right  upper thigh with insertion of a sheath to maintain venous access.  This will be used to reinserted Tunneled line widens blood cultures  clear.          Signed by: Chris Lott 7/16/2025 1:27 PM  Dictation workstation:   [id number]  Electrocardiogram, 12-lead PRN ACS symptoms  Normal sinus rhythm  Nonspecific ST abnormality  Abnormal ECG  No previous ECGs available  Confirmed by Willi Anderson ([id number]) on 7/16/2025 10:04:23 AM      Physical Exam  Vitals and nursing note reviewed.   Constitutional:       Appearance: Normal appearance.   HENT:      Head: Normocephalic and atraumatic.      Right Ear: External ear normal.      Left Ear: External ear normal.      Nose: Nose normal.      Mouth/Throat:      Mouth: Mucous membranes are moist.     Eyes:      Extraocular Movements: Extraocular movements intact.      Conjunctiva/sclera: Conjunctivae normal.      Pupils: Pupils are equal, round, and reactive to light.       Cardiovascular:      Rate and Rhythm: Normal rate.      Pulses: Normal pulses.      Heart sounds: Normal heart sounds.   Pulmonary:      Effort: Pulmonary effort is normal.      Breath sounds: Normal breath sounds.   Abdominal:      General: Bowel sounds are normal.      Palpations: Abdomen is soft.     Musculoskeletal:         General: Normal range of motion.      Cervical back: Normal range of motion.     Skin:     General: Skin is warm and dry.      Capillary Refill: Capillary refill takes less than 2 seconds.      Comments: Diffuse scattered areas of hypopigmentation BL-UE   R-fem HD cath dressing CDI     Neurological:      Mental Status: She is oriented to person, place, and time.         Relevant Results                    This patient has a central line   Reason for the central line remaining today? Dialysis/Hemapheresis  and Hemodynamic monitoring      Assessment & Plan  ESRD (end stage renal disease) on dialysis (Multi)    Palliative care encounter    Bacteremia  Blood cultures 7/10/2025 + yeast and Staphylococcus epidermidis  Continue ABX and antifungal per ID  Pending IR placement of central line for IV Micafungin  Follow cultures     Displaced HD catheter   ESRD on hemodialysis  S/p replacement 7/14  Renally dose meds, avoid nephrotoxic medications  Reportedly Interested in talking to Dr. Pedersen about peritoneal dialysis options   Nephrology following     Seizure  Resumed home medication  Seizure precautions     HTN  Continue antihypertensives  Monitor BP      CAD  Aspirin/statin     Anxiety/depression  Resumed home medications    DVT/GI  SCD, PPI     Disposition: Home once medically optimized and cleared by all consultants    7/16/25: Seen on floor after HD, tolerated well. No specific complaints at the moment. Pending IR placement of central line for IV micafungin. Appreciate ongoing reccs from specialists.            Shira Lim, APRN-CNP

## 2025-07-16 NOTE — NURSING NOTE
Assumed care for patient at 1900. Patient in bed. Routine night meds along with prn pain meds given. Bed  locked, low and call light in reach. Will continue to follow plan of care.

## 2025-07-16 NOTE — PROGRESS NOTES
"Batsheva Page is a 49 y.o. female on day 5 of admission presenting with No Principal Problem: There is no principal problem currently on the Problem List. Please update the Problem List and refresh..    Subjective   Seen for end-stage kidney disease and dialysis therapy came back from her dialysis therapy she is complain of some muscle cramping otherwise she is more awake and responsive no fever no chills and hemodynamically stable       Objective     Physical Exam  Neck:      Vascular: No carotid bruit.     Cardiovascular:      Rate and Rhythm: Normal rate and regular rhythm.      Heart sounds: Murmur (Systolic ejection murmur at the left sternal border) heard.      No friction rub. No gallop.   Pulmonary:      Breath sounds: No wheezing, rhonchi or rales.   Chest:      Chest wall: No tenderness.   Abdominal:      General: There is no distension.      Tenderness: There is no abdominal tenderness. There is no guarding or rebound.     Musculoskeletal:         General: No swelling or tenderness.      Cervical back: Neck supple.      Right lower leg: No edema.      Left lower leg: No edema.   Lymphadenopathy:      Cervical: No cervical adenopathy.         Last Recorded Vitals  Blood pressure 165/88, pulse 70, temperature 36.8 °C (98.2 °F), temperature source Temporal, resp. rate 18, height 1.651 m (5' 5\"), weight 69.6 kg (153 lb 7 oz), SpO2 95%.    Intake/Output last 3 Shifts:  I/O last 3 completed shifts:  In: 1284 (18.4 mL/kg) [P.O.:684; I.V.:200 (2.9 mL/kg); Other:400]  Out: 3400 (48.9 mL/kg) [Other:3400]  Weight: 69.6 kg   Current Medications[1]   Relevant Results    Results for orders placed or performed during the hospital encounter of 07/11/25 (from the past 96 hours)   Vancomycin   Result Value Ref Range    Vancomycin 32.0 (H) 5.0 - 20.0 ug/mL   CBC   Result Value Ref Range    WBC 7.0 4.4 - 11.3 x10*3/uL    nRBC 0.0 0.0 - 0.0 /100 WBCs    RBC 3.27 (L) 4.00 - 5.20 x10*6/uL    Hemoglobin 10.0 (L) 12.0 - " Patient, Dalton Kent (MRN #[id number]), presented with a recorded BMI of 35.44 kg/m^2 and a documented comorbidity(s):  - Obstructive Sleep Apnea  - Hypertension  to which the severe obesity is a contributing factor. This is consistent with the definition of severe obesity (BMI 35.0-39.9) with comorbidity (ICD-10 E66.01, Z68.35). The patient's severe obesity was monitored, evaluated, addressed and/or treated. This addendum to the medical record is made on 02/29/2020.   16.0 g/dL    Hematocrit 33.1 (L) 36.0 - 46.0 %     (H) 80 - 100 fL    MCH 30.6 26.0 - 34.0 pg    MCHC 30.2 (L) 32.0 - 36.0 g/dL    RDW 16.0 (H) 11.5 - 14.5 %    Platelets 183 150 - 450 x10*3/uL   Basic Metabolic Panel   Result Value Ref Range    Glucose 97 74 - 99 mg/dL    Sodium 134 (L) 136 - 145 mmol/L    Potassium 5.0 3.5 - 5.3 mmol/L    Chloride 94 (L) 98 - 107 mmol/L    Bicarbonate 22 21 - 32 mmol/L    Anion Gap 23 (H) 10 - 20 mmol/L    Urea Nitrogen 50 (H) 6 - 23 mg/dL    Creatinine 9.58 (H) 0.50 - 1.05 mg/dL    eGFR 5 (L) >60 mL/min/1.73m*2    Calcium 7.3 (L) 8.6 - 10.3 mg/dL   CBC   Result Value Ref Range    WBC 8.3 4.4 - 11.3 x10*3/uL    nRBC 0.0 0.0 - 0.0 /100 WBCs    RBC 3.02 (L) 4.00 - 5.20 x10*6/uL    Hemoglobin 9.3 (L) 12.0 - 16.0 g/dL    Hematocrit 30.7 (L) 36.0 - 46.0 %     (H) 80 - 100 fL    MCH 30.8 26.0 - 34.0 pg    MCHC 30.3 (L) 32.0 - 36.0 g/dL    RDW 16.4 (H) 11.5 - 14.5 %    Platelets 196 150 - 450 x10*3/uL   Basic Metabolic Panel   Result Value Ref Range    Glucose 89 74 - 99 mg/dL    Sodium 133 (L) 136 - 145 mmol/L    Potassium 5.3 3.5 - 5.3 mmol/L    Chloride 94 (L) 98 - 107 mmol/L    Bicarbonate 22 21 - 32 mmol/L    Anion Gap 22 (H) 10 - 20 mmol/L    Urea Nitrogen 60 (H) 6 - 23 mg/dL    Creatinine 10.67 (H) 0.50 - 1.05 mg/dL    eGFR 4 (L) >60 mL/min/1.73m*2    Calcium 7.4 (L) 8.6 - 10.3 mg/dL   Transthoracic Echo Complete   Result Value Ref Range    AV pk peggy 3.07 m/s    LVOT diam 2.00 cm    AV mn grad 18 mmHg    MV E/A ratio 2.23     Tricuspid annular plane systolic excursion 2.4 cm    LV Biplane EF 59 %    LA vol index A/L 26.6 ml/m2    LV EF 60 %    RVSP 63 mmHg    LVIDd 3.82 cm    AV pk grad 38 mmHg    Aortic Valve Area by Continuity of VTI 1.63 cm2    Aortic Valve Area by Continuity of Peak Velocity 1.57 cm2    LV A4C EF 59.1    POCT GLUCOSE   Result Value Ref Range    POCT Glucose 78 74 - 99 mg/dL   Basic Metabolic Panel   Result Value Ref Range    Glucose 82 74 - 99  mg/dL    Sodium 134 (L) 136 - 145 mmol/L    Potassium 5.0 3.5 - 5.3 mmol/L    Chloride 95 (L) 98 - 107 mmol/L    Bicarbonate 22 21 - 32 mmol/L    Anion Gap 22 (H) 10 - 20 mmol/L    Urea Nitrogen 33 (H) 6 - 23 mg/dL    Creatinine 7.54 (H) 0.50 - 1.05 mg/dL    eGFR 6 (L) >60 mL/min/1.73m*2    Calcium 7.7 (L) 8.6 - 10.3 mg/dL   CBC and Auto Differential   Result Value Ref Range    WBC 10.0 4.4 - 11.3 x10*3/uL    nRBC 0.0 0.0 - 0.0 /100 WBCs    RBC 2.92 (L) 4.00 - 5.20 x10*6/uL    Hemoglobin 9.2 (L) 12.0 - 16.0 g/dL    Hematocrit 29.8 (L) 36.0 - 46.0 %     (H) 80 - 100 fL    MCH 31.5 26.0 - 34.0 pg    MCHC 30.9 (L) 32.0 - 36.0 g/dL    RDW 16.7 (H) 11.5 - 14.5 %    Platelets 212 150 - 450 x10*3/uL    Neutrophils % 89.2 40.0 - 80.0 %    Immature Granulocytes %, Automated 0.4 0.0 - 0.9 %    Lymphocytes % 4.3 13.0 - 44.0 %    Monocytes % 5.6 2.0 - 10.0 %    Eosinophils % 0.1 0.0 - 6.0 %    Basophils % 0.4 0.0 - 2.0 %    Neutrophils Absolute 8.93 (H) 1.20 - 7.70 x10*3/uL    Immature Granulocytes Absolute, Automated 0.04 0.00 - 0.70 x10*3/uL    Lymphocytes Absolute 0.43 (L) 1.20 - 4.80 x10*3/uL    Monocytes Absolute 0.56 0.10 - 1.00 x10*3/uL    Eosinophils Absolute 0.01 0.00 - 0.70 x10*3/uL    Basophils Absolute 0.04 0.00 - 0.10 x10*3/uL   POCT GLUCOSE   Result Value Ref Range    POCT Glucose 82 74 - 99 mg/dL   POCT GLUCOSE   Result Value Ref Range    POCT Glucose 73 (L) 74 - 99 mg/dL   Basic Metabolic Panel   Result Value Ref Range    Glucose 77 74 - 99 mg/dL    Sodium 135 (L) 136 - 145 mmol/L    Potassium 5.1 3.5 - 5.3 mmol/L    Chloride 94 (L) 98 - 107 mmol/L    Bicarbonate 23 21 - 32 mmol/L    Anion Gap 23 (H) 10 - 20 mmol/L    Urea Nitrogen 44 (H) 6 - 23 mg/dL    Creatinine 8.78 (H) 0.50 - 1.05 mg/dL    eGFR 5 (L) >60 mL/min/1.73m*2    Calcium 7.5 (L) 8.6 - 10.3 mg/dL   CBC and Auto Differential   Result Value Ref Range    WBC 7.3 4.4 - 11.3 x10*3/uL    nRBC 0.0 0.0 - 0.0 /100 WBCs    RBC 3.05 (L) 4.00 - 5.20  x10*6/uL    Hemoglobin 9.5 (L) 12.0 - 16.0 g/dL    Hematocrit 30.0 (L) 36.0 - 46.0 %    MCV 98 80 - 100 fL    MCH 31.1 26.0 - 34.0 pg    MCHC 31.7 (L) 32.0 - 36.0 g/dL    RDW 16.2 (H) 11.5 - 14.5 %    Platelets 193 150 - 450 x10*3/uL    Neutrophils % 75.3 40.0 - 80.0 %    Immature Granulocytes %, Automated 0.3 0.0 - 0.9 %    Lymphocytes % 9.8 13.0 - 44.0 %    Monocytes % 9.0 2.0 - 10.0 %    Eosinophils % 4.9 0.0 - 6.0 %    Basophils % 0.7 0.0 - 2.0 %    Neutrophils Absolute 5.51 1.20 - 7.70 x10*3/uL    Immature Granulocytes Absolute, Automated 0.02 0.00 - 0.70 x10*3/uL    Lymphocytes Absolute 0.72 (L) 1.20 - 4.80 x10*3/uL    Monocytes Absolute 0.66 0.10 - 1.00 x10*3/uL    Eosinophils Absolute 0.36 0.00 - 0.70 x10*3/uL    Basophils Absolute 0.05 0.00 - 0.10 x10*3/uL       Assessment/Plan   End-stage kidney diseas plan to continue dialysis on Monday Wednesday and Fridaye  Bacteremia with Staph epidermidis antibiotics per infectious disease  Fungemia continue antibiotic per infectious disease dialysis catheter was changed  Anemia of chronic kidney disease  Hypertension  History of bacterial endocarditis  Coronary artery disease          Zhou Pedersen MD           [1]   Current Facility-Administered Medications:     amLODIPine (Norvasc) tablet 5 mg, 5 mg, oral, Daily, Noelle Doss, APRN-CNP, 5 mg at 07/15/25 0851    aspirin EC tablet 81 mg, 81 mg, oral, Daily, Shira Lim APRN-CNP, 81 mg at 07/15/25 0852    atorvastatin (Lipitor) tablet 40 mg, 40 mg, oral, Nightly, Noelle Doss, APRN-CNP, 40 mg at 07/15/25 2320    calcitriol (Rocaltrol) capsule 0.5 mcg, 0.5 mcg, oral, Daily, ANGELA Kincaid, 0.5 mcg at 07/15/25 0851    carvedilol (Coreg) tablet 12.5 mg, 12.5 mg, oral, BID, ANGELA Kincaid, 12.5 mg at 07/15/25 2320    cloNIDine (Catapres) tablet 0.3 mg, 0.3 mg, oral, q8h KIMBERLY, ANGELA Kincaid, 0.3 mg at 07/16/25 0620    dextrose 50 % injection 12.5 g, 12.5 g, intravenous, q15  min PRN, ORI Valentin-CNP    dextrose 50 % injection 25 g, 25 g, intravenous, q15 min PRN, ORI Valentin-CNP    diphenhydrAMINE (BENADryl) injection 50 mg, 50 mg, intravenous, q3h PRN, ORI Kincaid-CNP, 50 mg at 07/16/25 0929    [START ON 7/18/2025] ergocalciferol (Vitamin D-2) capsule 1.25 mg, 1.25 mg, oral, Every Friday, ORI Kincaid-CNP    glucagon (Glucagen) injection 1 mg, 1 mg, intramuscular, q15 min PRN, ORI Valentin-CNP    glucagon (Glucagen) injection 1 mg, 1 mg, intramuscular, q15 min PRN, ORI Valentin-CNP    heparin 1,000 unit/mL injection 1,000 Units, 1,000 Units, intra-catheter, After Dialysis, Zhou Pedersen MD, 2,600 Units at 07/16/25 1033    heparin 1,000 unit/mL injection 1,000 Units, 1,000 Units, intra-catheter, After Dialysis, Zhou Pedersen MD, 2,600 Units at 07/16/25 1033    hydrALAZINE (Apresoline) injection 5 mg, 5 mg, intravenous, q4h PRN, ORI Kincaid-CNP, 5 mg at 07/11/25 2030    HYDROmorphone (Dilaudid) injection 0.4 mg, 0.4 mg, intravenous, q3h PRN, ORI Kincaid-CNP, 0.4 mg at 07/16/25 0621    ipratropium-albuteroL (Duo-Neb) 0.5-2.5 mg/3 mL nebulizer solution 3 mL, 3 mL, nebulization, TID, Chris Lott MD, 3 mL at 07/15/25 1926    ipratropium-albuteroL (Duo-Neb) 0.5-2.5 mg/3 mL nebulizer solution 3 mL, 3 mL, nebulization, q2h PRN, Chris Lott MD    labetaloL (Normodyne,Trandate) injection 5 mg, 5 mg, intravenous, Once PRN, Masoud Serrato MD    levETIRAcetam (Keppra) tablet 750 mg, 750 mg, oral, Nightly, ANGELA Kincaid, 750 mg at 07/15/25 2320    lidocaine (Xylocaine) 10 mg/mL (1 %) injection 0.1 mL, 0.1 mL, subcutaneous, Once, Masoud Serrato MD    lidocaine 4 % patch 1 patch, 1 patch, transdermal, q24h, ANGELA Kincaid, 1 patch at 07/13/25 2000    LORazepam (Ativan) tablet 0.25 mg, 0.25 mg, oral, q6h PRN, ANGELA Kincaid    methocarbamol (Robaxin) tablet 500 mg, 500  mg, oral, q8h KIMBERLY, ORI Kincaid-CNP, 500 mg at 07/16/25 0623    micafungin (Mycamine) 100 mg in dextrose 5% 100 mL IV, 100 mg, intravenous, q24h, Cordell Steinberg MD, Stopped at 07/16/25 0047    oxyCODONE (Roxicodone) immediate release tablet 5 mg, 5 mg, oral, q4h PRN, Masoud Serrato MD, 5 mg at 07/12/25 2045    oxygen (O2) therapy, , inhalation, Continuous PRN - O2/gases, Karlo De La Vega MD    pantoprazole (ProtoNix) EC tablet 40 mg, 40 mg, oral, Daily before breakfast, 40 mg at 07/16/25 0620 **OR** pantoprazole (Protonix) injection 40 mg, 40 mg, intravenous, Daily before breakfast, ORI Blake-CNP    patiromer calcium sorbitex (Veltassa) packet 8.4 g, 1 packet, oral, Daily, Jerrod Pickett MD    pregabalin (Lyrica) capsule 75 mg, 75 mg, oral, Daily, ROI Kincaid-CNP, 75 mg at 07/15/25 0851    sodium chloride 3 % nebulizer solution 3 mL, 3 mL, nebulization, q6h while awake, ANGELA Goff, 3 mL at 07/15/25 1926

## 2025-07-16 NOTE — CARE PLAN
The patient's goals for the shift include      The clinical goals for the shift include pain management    Problem: Pain - Adult  Goal: Verbalizes/displays adequate comfort level or baseline comfort level  Outcome: Progressing     Problem: Safety - Adult  Goal: Free from fall injury  Outcome: Progressing     Problem: Discharge Planning  Goal: Discharge to home or other facility with appropriate resources  Outcome: Progressing     Problem: Chronic Conditions and Co-morbidities  Goal: Patient's chronic conditions and co-morbidity symptoms are monitored and maintained or improved  Outcome: Progressing     Problem: Nutrition  Goal: Nutrient intake appropriate for maintaining nutritional needs  Outcome: Progressing     Problem: Fall/Injury  Goal: Not fall by end of shift  Outcome: Progressing  Goal: Be free from injury by end of the shift  Outcome: Progressing  Goal: Verbalize understanding of personal risk factors for fall in the hospital  Outcome: Progressing  Goal: Verbalize understanding of risk factor reduction measures to prevent injury from fall in the home  Outcome: Progressing  Goal: Use assistive devices by end of the shift  Outcome: Progressing  Goal: Pace activities to prevent fatigue by end of the shift  Outcome: Progressing     Problem: Pain  Goal: Takes deep breaths with improved pain control throughout the shift  Outcome: Progressing  Goal: Turns in bed with improved pain control throughout the shift  Outcome: Progressing  Goal: Walks with improved pain control throughout the shift  Outcome: Progressing  Goal: Performs ADL's with improved pain control throughout shift  Outcome: Progressing  Goal: Participates in PT with improved pain control throughout the shift  Outcome: Progressing  Goal: Free from opioid side effects throughout the shift  Outcome: Progressing  Goal: Free from acute confusion related to pain meds throughout the shift  Outcome: Progressing     Problem: Skin  Goal: Decreased wound  size/increased tissue granulation at next dressing change  Outcome: Progressing  Flowsheets (Taken 7/16/2025 1223)  Decreased wound size/increased tissue granulation at next dressing change: Protective dressings over bony prominences  Goal: Participates in plan/prevention/treatment measures  Outcome: Progressing  Flowsheets (Taken 7/16/2025 1223)  Participates in plan/prevention/treatment measures:   Increase activity/out of bed for meals   Elevate heels  Goal: Prevent/manage excess moisture  Outcome: Progressing  Flowsheets (Taken 7/16/2025 1223)  Prevent/manage excess moisture:   Moisturize dry skin   Monitor for/manage infection if present  Goal: Prevent/minimize sheer/friction injuries  Outcome: Progressing  Flowsheets (Taken 7/16/2025 1223)  Prevent/minimize sheer/friction injuries:   HOB 30 degrees or less   Increase activity/out of bed for meals  Goal: Promote/optimize nutrition  Outcome: Progressing  Flowsheets (Taken 7/16/2025 1223)  Promote/optimize nutrition:   Monitor/record intake including meals   Consume > 50% meals/supplements  Goal: Promote skin healing  Outcome: Progressing  Flowsheets (Taken 7/16/2025 1223)  Promote skin healing: Assess skin/pad under line(s)/device(s)     Problem: Respiratory  Goal: Clear secretions with interventions this shift  Outcome: Progressing  Goal: Minimize anxiety/maximize coping throughout shift  Outcome: Progressing  Goal: Minimal/no exertional discomfort or dyspnea this shift  Outcome: Progressing  Goal: No signs of respiratory distress (eg. Use of accessory muscles. Peds grunting)  Outcome: Progressing

## 2025-07-16 NOTE — CARE PLAN
Per yesterdays note by Dr. Steinberg, pt will have IV Micafungin long term, stop date is 724/25. Sent secure chat to Dr. Steinberg asking if pt will have the IV therapy during HD. Waiting for response.  Will need new discharge order and order for [facility] if pt will need home IV Therapy.  12:46 pt will need home IV infusion;  pharmacy unable to contact pt. Will attempt to contact pt or family to inform Pt will need a midline.  13:02 spoke to pt in her room via vital chat. Pt is aware that Pharmacy will contact her. Pt was in dialysis this AM. Pt said that her boyfriend will be teachable and willing to assist her with the IV Infusions.    DISCHARGE PLAN: TBD--DO NOT DISCHARGE PATIENT BEFORE SPEAKING WITH CARE COORDINATION.- NEED FOR IV MEDS TO BE DELIVERED AT PTS HOME; MUST HAVE [facility] NURSING SET UP WITH A START DATE; PT MUST HAVE MIDLINE PLACED

## 2025-07-16 NOTE — POST-PROCEDURE NOTE
Report to Receiving RN:    Report To: Bethanie Jaimes RN  Time Report Called: [id number]  Hand-Off Communication: Treatment completed 2 liters removed last bp was 165/88 hr 70. A heparin dwell was administered at 2.6 per lumen fill volume. Patient is resting, with no complaints.  Complications During Treatment: No  Ultrafiltration Treatment: Yes  Medications Administered During Dialysis: Yes, heparin dwell  Blood Products Administered During Dialysis: No  Labs Sent During Dialysis: N/A  Heparin Drip Rate Changes: N/A  Dialysis Catheter Dressing: dried blood but fine.  Last Dressing Change: 7/14/2025         Last Updated: 10:59 AM by DAVON WALSH

## 2025-07-17 ENCOUNTER — APPOINTMENT (OUTPATIENT)
Dept: RADIOLOGY | Facility: HOSPITAL | Age: 50
End: 2025-07-17
Payer: MEDICARE

## 2025-07-17 ENCOUNTER — ANESTHESIA EVENT (OUTPATIENT)
Dept: CARDIOLOGY | Facility: HOSPITAL | Age: 50
End: 2025-07-17

## 2025-07-17 ENCOUNTER — HOSPITAL ENCOUNTER (INPATIENT)
Facility: HOSPITAL | Age: 50
DRG: 871 | End: 2025-07-17
Attending: INTERNAL MEDICINE | Admitting: STUDENT IN AN ORGANIZED HEALTH CARE EDUCATION/TRAINING PROGRAM
Payer: MEDICARE

## 2025-07-17 VITALS
HEIGHT: 65 IN | HEART RATE: 84 BPM | TEMPERATURE: 98.4 F | OXYGEN SATURATION: 100 % | DIASTOLIC BLOOD PRESSURE: 77 MMHG | BODY MASS INDEX: 25.56 KG/M2 | WEIGHT: 153.44 LBS | RESPIRATION RATE: 20 BRPM | SYSTOLIC BLOOD PRESSURE: 148 MMHG

## 2025-07-17 DIAGNOSIS — R78.81 BACTEREMIA: Primary | ICD-10-CM

## 2025-07-17 DIAGNOSIS — R60.1 GENERALIZED EDEMA: ICD-10-CM

## 2025-07-17 DIAGNOSIS — R60.0 EDEMA OF RIGHT UPPER ARM: ICD-10-CM

## 2025-07-17 LAB
ALBUMIN SERPL BCP-MCNC: 3.7 G/DL (ref 3.4–5)
ALP SERPL-CCNC: 107 U/L (ref 33–110)
ALT SERPL W P-5'-P-CCNC: 5 U/L (ref 7–45)
ANION GAP SERPL CALCULATED.3IONS-SCNC: 19 MMOL/L (ref 10–20)
ANION GAP SERPL CALCULATED.3IONS-SCNC: 19 MMOL/L (ref 10–20)
AST SERPL W P-5'-P-CCNC: 21 U/L (ref 9–39)
BASOPHILS # BLD AUTO: 0.04 X10*3/UL (ref 0–0.1)
BASOPHILS NFR BLD AUTO: 0.5 %
BILIRUB SERPL-MCNC: 0.7 MG/DL (ref 0–1.2)
BUN SERPL-MCNC: 22 MG/DL (ref 6–23)
BUN SERPL-MCNC: 22 MG/DL (ref 6–23)
CALCIUM SERPL-MCNC: 8 MG/DL (ref 8.6–10.3)
CALCIUM SERPL-MCNC: 8 MG/DL (ref 8.6–10.3)
CHLORIDE SERPL-SCNC: 96 MMOL/L (ref 98–107)
CHLORIDE SERPL-SCNC: 96 MMOL/L (ref 98–107)
CO2 SERPL-SCNC: 24 MMOL/L (ref 21–32)
CO2 SERPL-SCNC: 24 MMOL/L (ref 21–32)
CREAT SERPL-MCNC: 5.97 MG/DL (ref 0.5–1.05)
CREAT SERPL-MCNC: 5.97 MG/DL (ref 0.5–1.05)
EGFRCR SERPLBLD CKD-EPI 2021: 8 ML/MIN/1.73M*2
EGFRCR SERPLBLD CKD-EPI 2021: 8 ML/MIN/1.73M*2
EOSINOPHIL # BLD AUTO: 0.04 X10*3/UL (ref 0–0.7)
EOSINOPHIL NFR BLD AUTO: 0.5 %
ERYTHROCYTE [DISTWIDTH] IN BLOOD BY AUTOMATED COUNT: 16.1 % (ref 11.5–14.5)
FLUAV RNA RESP QL NAA+PROBE: NOT DETECTED
FLUBV RNA RESP QL NAA+PROBE: NOT DETECTED
GLUCOSE BLD MANUAL STRIP-MCNC: 107 MG/DL (ref 74–99)
GLUCOSE BLD MANUAL STRIP-MCNC: 113 MG/DL (ref 74–99)
GLUCOSE BLD MANUAL STRIP-MCNC: 117 MG/DL (ref 74–99)
GLUCOSE BLD MANUAL STRIP-MCNC: 117 MG/DL (ref 74–99)
GLUCOSE SERPL-MCNC: 88 MG/DL (ref 74–99)
GLUCOSE SERPL-MCNC: 88 MG/DL (ref 74–99)
HCT VFR BLD AUTO: 35.8 % (ref 36–46)
HGB BLD-MCNC: 11 G/DL (ref 12–16)
IMM GRANULOCYTES # BLD AUTO: 0.03 X10*3/UL (ref 0–0.7)
IMM GRANULOCYTES NFR BLD AUTO: 0.4 % (ref 0–0.9)
LYMPHOCYTES # BLD AUTO: 0.48 X10*3/UL (ref 1.2–4.8)
LYMPHOCYTES NFR BLD AUTO: 6 %
MCH RBC QN AUTO: 30.5 PG (ref 26–34)
MCHC RBC AUTO-ENTMCNC: 30.7 G/DL (ref 32–36)
MCV RBC AUTO: 99 FL (ref 80–100)
MONOCYTES # BLD AUTO: 0.58 X10*3/UL (ref 0.1–1)
MONOCYTES NFR BLD AUTO: 7.2 %
NEUTROPHILS # BLD AUTO: 6.87 X10*3/UL (ref 1.2–7.7)
NEUTROPHILS NFR BLD AUTO: 85.4 %
NRBC BLD-RTO: 0 /100 WBCS (ref 0–0)
PLATELET # BLD AUTO: 177 X10*3/UL (ref 150–450)
POTASSIUM SERPL-SCNC: 4.2 MMOL/L (ref 3.5–5.3)
POTASSIUM SERPL-SCNC: 4.2 MMOL/L (ref 3.5–5.3)
PROT SERPL-MCNC: 8.4 G/DL (ref 6.4–8.2)
RBC # BLD AUTO: 3.61 X10*6/UL (ref 4–5.2)
RSV RNA RESP QL NAA+PROBE: NOT DETECTED
SARS-COV-2 RNA RESP QL NAA+PROBE: NOT DETECTED
SODIUM SERPL-SCNC: 135 MMOL/L (ref 136–145)
SODIUM SERPL-SCNC: 135 MMOL/L (ref 136–145)
WBC # BLD AUTO: 8 X10*3/UL (ref 4.4–11.3)

## 2025-07-17 PROCEDURE — 85025 COMPLETE CBC W/AUTO DIFF WBC: CPT

## 2025-07-17 PROCEDURE — 74176 CT ABD & PELVIS W/O CONTRAST: CPT

## 2025-07-17 PROCEDURE — 2500000001 HC RX 250 WO HCPCS SELF ADMINISTERED DRUGS (ALT 637 FOR MEDICARE OP): Performed by: NURSE PRACTITIONER

## 2025-07-17 PROCEDURE — 94664 DEMO&/EVAL PT USE INHALER: CPT

## 2025-07-17 PROCEDURE — 87154 CUL TYP ID BLD PTHGN 6+ TRGT: CPT | Mod: WESLAB | Performed by: NURSE PRACTITIONER

## 2025-07-17 PROCEDURE — 87637 SARSCOV2&INF A&B&RSV AMP PRB: CPT | Performed by: INTERNAL MEDICINE

## 2025-07-17 PROCEDURE — 99233 SBSQ HOSP IP/OBS HIGH 50: CPT

## 2025-07-17 PROCEDURE — 87081 CULTURE SCREEN ONLY: CPT | Mod: WESLAB

## 2025-07-17 PROCEDURE — 80048 BASIC METABOLIC PNL TOTAL CA: CPT | Mod: CCI | Performed by: REGISTERED NURSE

## 2025-07-17 PROCEDURE — 2500000004 HC RX 250 GENERAL PHARMACY W/ HCPCS (ALT 636 FOR OP/ED): Performed by: INTERNAL MEDICINE

## 2025-07-17 PROCEDURE — 80053 COMPREHEN METABOLIC PANEL: CPT

## 2025-07-17 PROCEDURE — 2500000004 HC RX 250 GENERAL PHARMACY W/ HCPCS (ALT 636 FOR OP/ED): Mod: JW

## 2025-07-17 PROCEDURE — 2500000004 HC RX 250 GENERAL PHARMACY W/ HCPCS (ALT 636 FOR OP/ED)

## 2025-07-17 PROCEDURE — 87077 CULTURE AEROBIC IDENTIFY: CPT | Mod: WESLAB | Performed by: REGISTERED NURSE

## 2025-07-17 PROCEDURE — 94640 AIRWAY INHALATION TREATMENT: CPT

## 2025-07-17 PROCEDURE — 74176 CT ABD & PELVIS W/O CONTRAST: CPT | Performed by: STUDENT IN AN ORGANIZED HEALTH CARE EDUCATION/TRAINING PROGRAM

## 2025-07-17 PROCEDURE — 36415 COLL VENOUS BLD VENIPUNCTURE: CPT | Performed by: REGISTERED NURSE

## 2025-07-17 PROCEDURE — 71045 X-RAY EXAM CHEST 1 VIEW: CPT | Performed by: RADIOLOGY

## 2025-07-17 PROCEDURE — 1210000001 HC SEMI-PRIVATE ROOM DAILY

## 2025-07-17 PROCEDURE — 36415 COLL VENOUS BLD VENIPUNCTURE: CPT

## 2025-07-17 PROCEDURE — 71250 CT THORAX DX C-: CPT | Performed by: STUDENT IN AN ORGANIZED HEALTH CARE EDUCATION/TRAINING PROGRAM

## 2025-07-17 PROCEDURE — 2500000001 HC RX 250 WO HCPCS SELF ADMINISTERED DRUGS (ALT 637 FOR MEDICARE OP)

## 2025-07-17 PROCEDURE — 71045 X-RAY EXAM CHEST 1 VIEW: CPT

## 2025-07-17 PROCEDURE — 2500000005 HC RX 250 GENERAL PHARMACY W/O HCPCS

## 2025-07-17 PROCEDURE — 82947 ASSAY GLUCOSE BLOOD QUANT: CPT

## 2025-07-17 PROCEDURE — 87077 CULTURE AEROBIC IDENTIFY: CPT | Mod: WESLAB | Performed by: NURSE PRACTITIONER

## 2025-07-17 PROCEDURE — 2500000002 HC RX 250 W HCPCS SELF ADMINISTERED DRUGS (ALT 637 FOR MEDICARE OP, ALT 636 FOR OP/ED): Performed by: RADIOLOGY

## 2025-07-17 RX ORDER — DIPHENHYDRAMINE HCL 50 MG
50 CAPSULE ORAL EVERY 4 HOURS PRN
Status: DISCONTINUED | OUTPATIENT
Start: 2025-07-17 | End: 2025-07-17 | Stop reason: HOSPADM

## 2025-07-17 RX ORDER — HYDROMORPHONE HYDROCHLORIDE 2 MG/1
1 TABLET ORAL EVERY 4 HOURS PRN
Refills: 0 | Status: DISCONTINUED | OUTPATIENT
Start: 2025-07-17 | End: 2025-07-17 | Stop reason: HOSPADM

## 2025-07-17 RX ORDER — VANCOMYCIN HYDROCHLORIDE 1 G/20ML
INJECTION, POWDER, LYOPHILIZED, FOR SOLUTION INTRAVENOUS DAILY PRN
Status: DISCONTINUED | OUTPATIENT
Start: 2025-07-17 | End: 2025-07-18

## 2025-07-17 RX ORDER — VANCOMYCIN 750 MG/150ML
750 INJECTION, SOLUTION INTRAVENOUS
Status: DISCONTINUED | OUTPATIENT
Start: 2025-07-18 | End: 2025-07-17 | Stop reason: HOSPADM

## 2025-07-17 RX ORDER — VANCOMYCIN 750 MG/150ML
750 INJECTION, SOLUTION INTRAVENOUS ONCE
Status: DISCONTINUED | OUTPATIENT
Start: 2025-07-17 | End: 2025-07-17 | Stop reason: HOSPADM

## 2025-07-17 RX ORDER — VANCOMYCIN HYDROCHLORIDE 1 G/20ML
INJECTION, POWDER, LYOPHILIZED, FOR SOLUTION INTRAVENOUS DAILY PRN
Status: DISCONTINUED | OUTPATIENT
Start: 2025-07-17 | End: 2025-07-17 | Stop reason: HOSPADM

## 2025-07-17 RX ORDER — HYDROMORPHONE HYDROCHLORIDE 2 MG/1
2 TABLET ORAL EVERY 4 HOURS PRN
Refills: 0 | Status: DISCONTINUED | OUTPATIENT
Start: 2025-07-17 | End: 2025-07-17 | Stop reason: HOSPADM

## 2025-07-17 RX ORDER — ACETAMINOPHEN 325 MG/1
650 TABLET ORAL EVERY 6 HOURS PRN
Status: DISCONTINUED | OUTPATIENT
Start: 2025-07-17 | End: 2025-07-20

## 2025-07-17 RX ADMIN — DIPHENHYDRAMINE HYDROCHLORIDE 50 MG: 50 INJECTION, SOLUTION INTRAMUSCULAR; INTRAVENOUS at 13:02

## 2025-07-17 RX ADMIN — METHOCARBAMOL 500 MG: 500 TABLET ORAL at 21:20

## 2025-07-17 RX ADMIN — ASPIRIN 81 MG: 81 TABLET, DELAYED RELEASE ORAL at 08:33

## 2025-07-17 RX ADMIN — CALCITRIOL CAPSULES 0.25 MCG 0.5 MCG: 0.25 CAPSULE ORAL at 08:32

## 2025-07-17 RX ADMIN — CARVEDILOL 12.5 MG: 12.5 TABLET, FILM COATED ORAL at 08:33

## 2025-07-17 RX ADMIN — SODIUM CHLORIDE SOLN NEBU 3% 3 ML: 3 NEBU SOLN at 07:09

## 2025-07-17 RX ADMIN — HYDROMORPHONE HYDROCHLORIDE 0.4 MG: 0.5 INJECTION, SOLUTION INTRAMUSCULAR; INTRAVENOUS; SUBCUTANEOUS at 13:02

## 2025-07-17 RX ADMIN — PANTOPRAZOLE SODIUM 40 MG: 40 INJECTION, POWDER, LYOPHILIZED, FOR SOLUTION INTRAVENOUS at 06:22

## 2025-07-17 RX ADMIN — IPRATROPIUM BROMIDE AND ALBUTEROL SULFATE 3 ML: 2.5; .5 SOLUTION RESPIRATORY (INHALATION) at 19:26

## 2025-07-17 RX ADMIN — DIPHENHYDRAMINE HYDROCHLORIDE 50 MG: 50 INJECTION, SOLUTION INTRAMUSCULAR; INTRAVENOUS at 09:43

## 2025-07-17 RX ADMIN — METHOCARBAMOL 500 MG: 500 TABLET ORAL at 13:02

## 2025-07-17 RX ADMIN — SODIUM CHLORIDE SOLN NEBU 3% 3 ML: 3 NEBU SOLN at 19:26

## 2025-07-17 RX ADMIN — HYDROMORPHONE HYDROCHLORIDE 2 MG: 2 TABLET ORAL at 21:20

## 2025-07-17 RX ADMIN — DOXYCYCLINE 100 MG: 100 INJECTION, POWDER, LYOPHILIZED, FOR SOLUTION INTRAVENOUS at 21:21

## 2025-07-17 RX ADMIN — MICAFUNGIN SODIUM 100 MG: 100 INJECTION, POWDER, LYOPHILIZED, FOR SOLUTION INTRAVENOUS at 22:40

## 2025-07-17 RX ADMIN — CARVEDILOL 12.5 MG: 12.5 TABLET, FILM COATED ORAL at 21:20

## 2025-07-17 RX ADMIN — HYDROMORPHONE HYDROCHLORIDE 2 MG: 2 TABLET ORAL at 17:34

## 2025-07-17 RX ADMIN — CLONIDINE HYDROCHLORIDE 0.3 MG: 0.2 TABLET ORAL at 13:02

## 2025-07-17 RX ADMIN — CLONIDINE HYDROCHLORIDE 0.3 MG: 0.2 TABLET ORAL at 05:30

## 2025-07-17 RX ADMIN — ACETAMINOPHEN 650 MG: 325 TABLET ORAL at 16:34

## 2025-07-17 RX ADMIN — DIPHENHYDRAMINE HYDROCHLORIDE 50 MG: 50 INJECTION, SOLUTION INTRAMUSCULAR; INTRAVENOUS at 06:23

## 2025-07-17 RX ADMIN — ACETAMINOPHEN 650 MG: 325 TABLET ORAL at 00:03

## 2025-07-17 RX ADMIN — AMLODIPINE BESYLATE 5 MG: 5 TABLET ORAL at 08:32

## 2025-07-17 RX ADMIN — PREGABALIN 75 MG: 75 CAPSULE ORAL at 08:32

## 2025-07-17 RX ADMIN — METHOCARBAMOL 500 MG: 500 TABLET ORAL at 05:30

## 2025-07-17 RX ADMIN — HYDROMORPHONE HYDROCHLORIDE 0.4 MG: 0.5 INJECTION, SOLUTION INTRAMUSCULAR; INTRAVENOUS; SUBCUTANEOUS at 03:44

## 2025-07-17 RX ADMIN — CLONIDINE HYDROCHLORIDE 0.3 MG: 0.2 TABLET ORAL at 21:20

## 2025-07-17 RX ADMIN — DIPHENHYDRAMINE HYDROCHLORIDE 50 MG: 50 CAPSULE ORAL at 21:20

## 2025-07-17 RX ADMIN — HYDROMORPHONE HYDROCHLORIDE 0.4 MG: 0.5 INJECTION, SOLUTION INTRAMUSCULAR; INTRAVENOUS; SUBCUTANEOUS at 09:43

## 2025-07-17 RX ADMIN — ACETAMINOPHEN 650 MG: 325 TABLET ORAL at 08:32

## 2025-07-17 RX ADMIN — ACETAMINOPHEN 650 MG: 325 TABLET ORAL at 05:30

## 2025-07-17 RX ADMIN — DIPHENHYDRAMINE HYDROCHLORIDE 50 MG: 50 INJECTION, SOLUTION INTRAMUSCULAR; INTRAVENOUS at 03:44

## 2025-07-17 RX ADMIN — HYDROMORPHONE HYDROCHLORIDE 0.4 MG: 0.5 INJECTION, SOLUTION INTRAMUSCULAR; INTRAVENOUS; SUBCUTANEOUS at 06:23

## 2025-07-17 RX ADMIN — DIPHENHYDRAMINE HYDROCHLORIDE 50 MG: 50 CAPSULE ORAL at 17:34

## 2025-07-17 RX ADMIN — IPRATROPIUM BROMIDE AND ALBUTEROL SULFATE 3 ML: 2.5; .5 SOLUTION RESPIRATORY (INHALATION) at 07:09

## 2025-07-17 ASSESSMENT — PAIN SCALES - GENERAL
PAINLEVEL_OUTOF10: 10 - WORST POSSIBLE PAIN
PAINLEVEL_OUTOF10: 0 - NO PAIN
PAINLEVEL_OUTOF10: 10 - WORST POSSIBLE PAIN
PAINLEVEL_OUTOF10: 8
PAINLEVEL_OUTOF10: 10 - WORST POSSIBLE PAIN
PAINLEVEL_OUTOF10: 10 - WORST POSSIBLE PAIN
PAINLEVEL_OUTOF10: 6
PAINLEVEL_OUTOF10: 0 - NO PAIN
PAINLEVEL_OUTOF10: 10 - WORST POSSIBLE PAIN
PAINLEVEL_OUTOF10: 0 - NO PAIN
PAINLEVEL_OUTOF10: 10 - WORST POSSIBLE PAIN

## 2025-07-17 ASSESSMENT — PAIN DESCRIPTION - ORIENTATION
ORIENTATION: RIGHT
ORIENTATION: RIGHT

## 2025-07-17 ASSESSMENT — PAIN DESCRIPTION - DESCRIPTORS
DESCRIPTORS: SHARP;SHOOTING
DESCRIPTORS: SHARP;SHOOTING
DESCRIPTORS: STABBING;SHOOTING
DESCRIPTORS: ACHING
DESCRIPTORS: STABBING;SHARP
DESCRIPTORS: SHOOTING;STABBING
DESCRIPTORS: SHOOTING;STABBING
DESCRIPTORS: SHARP;SHOOTING
DESCRIPTORS: SHOOTING;STABBING
DESCRIPTORS: STABBING;SHOOTING

## 2025-07-17 ASSESSMENT — PAIN DESCRIPTION - LOCATION
LOCATION: GROIN
LOCATION: GENERALIZED
LOCATION: GENERALIZED
LOCATION: GROIN
LOCATION: GROIN
LOCATION: GENERALIZED

## 2025-07-17 ASSESSMENT — ENCOUNTER SYMPTOMS
CHILLS: 0
FEVER: 1

## 2025-07-17 NOTE — PROGRESS NOTES
Batsheva Page is a 49 y.o. female on day 6 of admission presenting with No Principal Problem: There is no principal problem currently on the Problem List. Please update the Problem List and refresh..    Subjective   Interval History:   Patient seen and examined  Interval temperature spikes  Patient denies any other complaints  Denies any cough, chest pain or shortness of breath  No nausea vomiting or diarrhea  Transfer to [facility] in progress      Review of Systems   Constitutional:  Positive for fever. Negative for chills.   All other systems reviewed and are negative.      Objective   Range of Vitals (last 24 hours)  Heart Rate:  []   Temp:  [36.7 °C (98.1 °F)-38.7 °C (101.7 °F)]   Resp:  [16-20]   BP: (141-177)/(58-88)   SpO2:  [92 %-99 %]   Daily Weight  07/15/25 : 69.6 kg (153 lb 7 oz)    Body mass index is 25.53 kg/m².    Physical Exam  Constitutional:       Appearance: Normal appearance.   HENT:      Head: Normocephalic and atraumatic.      Right Ear: External ear normal.      Left Ear: External ear normal.      Nose: Nose normal.   Eyes:      General: No scleral icterus.     Extraocular Movements: Extraocular movements intact.      Conjunctiva/sclera: Conjunctivae normal.   Cardiovascular:      Rate and Rhythm: Normal rate and regular rhythm.      Heart sounds: Normal heart sounds, S1 normal and S2 normal.   Pulmonary:      Effort: Pulmonary effort is normal.      Breath sounds: Decreased breath sounds present.   Abdominal:      General: Bowel sounds are normal.      Palpations: Abdomen is soft.      Tenderness: There is no abdominal tenderness.   Musculoskeletal:      Cervical back: Normal range of motion and neck supple.      Right lower leg: No edema.      Left lower leg: No edema.   Skin:     General: Skin is warm and dry.   Neurological:      Mental Status: She is alert.   Psychiatric:         Behavior: Behavior normal. Behavior is cooperative.         Antibiotics  micafungin - 100 mg/100 mL  micafungin (Mycamine)  mg in 100 mL D5W (VBS)  VANCOMYCIN 5 MG/ML IN NS LOCK    Relevant Results  Labs  Results from last 72 hours   Lab Units 07/17/25  0548 07/16/25  0523 07/15/25  0744   WBC AUTO x10*3/uL 8.0 7.3 10.0   HEMOGLOBIN g/dL 11.0* 9.5* 9.2*   HEMATOCRIT % 35.8* 30.0* 29.8*   PLATELETS AUTO x10*3/uL 177 193 212   NEUTROS PCT AUTO % 85.4 75.3 89.2   LYMPHS PCT AUTO % 6.0 9.8 4.3   MONOS PCT AUTO % 7.2 9.0 5.6   EOS PCT AUTO % 0.5 4.9 0.1     Results from last 72 hours   Lab Units 07/17/25  0548 07/16/25  0523 07/15/25  0744   SODIUM mmol/L 135*  135* 135* 134*   POTASSIUM mmol/L 4.2  4.2 5.1 5.0   CHLORIDE mmol/L 96*  96* 94* 95*   CO2 mmol/L 24  24 23 22   BUN mg/dL 22  22 44* 33*   CREATININE mg/dL 5.97*  5.97* 8.78* 7.54*   GLUCOSE mg/dL 88  88 77 82   CALCIUM mg/dL 8.0*  8.0* 7.5* 7.7*   ANION GAP mmol/L 19  19 23* 22*   EGFR mL/min/1.73m*2 8*  8* 5* 6*     Results from last 72 hours   Lab Units 07/17/25  0548   ALK PHOS U/L 107   BILIRUBIN TOTAL mg/dL 0.7   PROTEIN TOTAL g/dL 8.4*   ALT U/L 5*   AST U/L 21   ALBUMIN g/dL 3.7     Estimated Creatinine Clearance: 11 mL/min (A) (by C-G formula based on SCr of 5.97 mg/dL (H)).  C-Reactive Protein   Date Value Ref Range Status   12/03/2024 27.39 (H) <1.00 mg/dL Final     CRP   Date Value Ref Range Status   12/25/2022 9.90 (A) mg/dL Final     Comment:     REF VALUE  < 1.00     12/23/2022 23.46 (A) mg/dL Final     Comment:     REF VALUE  < 1.00       Microbiology  Susceptibility data from last 14 days.  Collected Specimen Info Organism   07/10/25 Blood culture from Arterial Line Staphylococcus epidermidis     Candida (Nakaseomyces) glabrata   07/10/25 Blood culture from Arterial Line Candida (Nakaseomyces) glabrata     Imaging  CT chest abdomen pelvis wo IV contrast  Result Date: 7/17/2025  Interpreted By:  Antonio Zarate, STUDY: CT CHEST ABDOMEN PELVIS WO CONTRAST;  7/17/2025 1:25 pm   INDICATION:  Signs/Symptoms:febrile s/p r fem hd catheter exchange.     COMPARISON: Abdomen CT scan 06/20/2020   ACCESSION NUMBER(S): [id number]   ORDERING CLINICIAN: MIGUEL ALCARAZ   TECHNIQUE: CT of the chest, abdomen and pelvis was performed. Contiguous axial images were obtained at 3 mm slice thickness through the chest, abdomen and pelvis. Coronal and sagittal reconstructions at 3 mm slice thickness were performed.  No intravenous contrast was administered; positive oral contrast was given.   FINDINGS: Please note that the study is limited without intravenous contrast.   CHEST:     LUNG/PLEURA/LARGE AIRWAYS: Loculated right-greater-than-left pleural effusion with surrounding atelectasis and scarring with the largest pocket along the right major fissure measuring 6.7 x 4.3 cm. Scattered bilateral airspace opacities. No pneumothorax. Central airways are patent.   VESSELS: Mild thoracic aortic vascular calcifications. Mild coronary artery calcifications.   HEART: Cardiomegaly. No pericardial effusion. Aortic valve and mitral valve calcifications. Epicardial pacing wire noted.   MEDIASTINUM AND HERIBERTO: Few subcentimeter mediastinal lymph nodes likely reactive. The esophagus appears normal.   CHEST WALL AND LOWER NECK: Previous median sternotomy. No chest wall masses. What is visualized of the thyroid gland is unremarkable right shoulder/axillary region vascular grafts noted.   ABDOMEN:   LIVER: The liver is normal in size without evidence of focal liver lesions.   BILE DUCTS: No biliary dilatation   GALLBLADDER: Distended without acute changes   PANCREAS: Atrophic changes. No duct dilatation   SPLEEN: No splenomegaly   ADRENAL GLANDS: No nodule   KIDNEYS AND URETERS: Atrophic native kidneys without hydronephrosis.   PELVIS:   BLADDER: Decompressed   REPRODUCTIVE ORGANS: Right adnexal cyst measuring 4.5 cm.   BOWEL: No bowel dilatation. Large colonic stool burden. No bowel dilatation. Trace pelvic ascites. No pneumoperitoneum.    VESSELS: Multifocal vascular calcifications. Right femoral venous catheter that extends all the way to the junction of the right atrium with the inferior vena cava.   PERITONEUM/RETROPERITONEUM/LYMPH NODES: No enlarged lymph nodes.   ABDOMINAL WALL: Subcutaneous edema.   BONES: Multilevel degenerative spine changes and facet joint disease.       Chest 1. Bilateral loculated pleural and fissural pleural effusion of indeterminate sterility, largest pocket along the right major fissure measuring 6.7 x 4.3 cm with surrounding compressive atelectasis. 2. Scattered bilateral upper lobe predominant airspace opacities could be infectious. 3. Multiple mildly enlarged mediastinal lymph nodes possibly reactive.   Abdomen-Pelvis 1. No acute abnormality in the abdomen or pelvis within the limitation of this unenhanced study. 2. Persistent right adnexal cystic lesion measuring 4.5 cm which could be further assessed by dedicated nonemergent outpatient pelvic ultrasound     MACRO: None   Signed by: Antonio Zarate 7/17/2025 3:09 PM Dictation workstation:   [id number]    XR chest 1 view  Result Date: 7/17/2025  Interpreted By:  Radha Tompkins, STUDY: XR CHEST 1 VIEW 7/17/2025 11:12 am   INDICATION: Pneumonia   COMPARISON: 12/14/2024   ACCESSION NUMBER(S): [id number]   ORDERING CLINICIAN: XANDER CEVALLOS   TECHNIQUE: AP erect view of the chest   FINDINGS: Postoperative change from median sternotomy and aortic valve replacement is noted. The heart is mildly enlarged. There is a catheter extending in the IVC and into the right atrium.   There is a new 4-1/2 cm rounded soft tissue density in the right mid lung zone which may represent round pneumonia, round atelectasis, or pseudotumor from pleural fluid loculated along the right minor fissure. The possibility of a pulmonary neoplasm at this location is not entirely excluded. Additionally, there is left perihilar infiltrate noted.   The cardiac size is stable.   There are stent grafts  within both upper extremities.       Mild cardiomegaly with aortic valve replacement.   4-1/2 cm rounded soft tissue density in the right mid lung zone which may represent round pneumonia, round atelectasis, or pseudotumor from loculated pleural fluid with pulmonary neoplasm not excluded. Close follow-up is recommended. A noncontrast CT examination of the chest could be considered for further assessment.   Left perihilar infiltrate.   Signed by: Radha Tompkins 7/17/2025 11:53 AM Dictation workstation:   [id number]    IR CVC exchange  Result Date: 7/16/2025  Interpreted By:  Chris Lott, STUDY: IR CVC EXCHANGE; 7/11/2025 5:42 pm   INDICATION: Tunneled dialysis catheter cuff out. Fungemia   COMPARISON: None   ACCESSION NUMBER(S): [id number]   ORDERING CLINICIAN: PAT BRYAN   TECHNIQUE: Exchange of tunneled central venous catheter without port.   FINDINGS: Informed consent obtained. Patient positioned supine and connected to physiologic monitoring. Intravenous sedation provided by anesthesiology.. All elements of maximal sterile barrier were utilized including cap, mask, sterile gown, sterile gloves, large sterile drape, hand scrub, 2% chlorhexidine for cleaning the right chest wall and indwelling catheter. Skin and subcutaneous tract around insertion site anesthetized with lidocaine. Indwelling right upper thigh catheter was removed over Amplatz wire. Given patient's history of very poor remaining vascular access, decision made to maintain access at current right common femoral site by advancement of a 12 Romanian sheath over the wire. The sheath was secured with a suture and covered with a dressing.       Over-the-wire removal of tunneled central venous catheter and right upper thigh with insertion of a sheath to maintain venous access. This will be used to reinserted Tunneled line widens blood cultures clear.     Signed by: Chris Lott 7/16/2025 1:27 PM Dictation workstation:    [id number]    Electrocardiogram, 12-lead PRN ACS symptoms  Result Date: 7/16/2025  Normal sinus rhythm Nonspecific ST abnormality Abnormal ECG No previous ECGs available Confirmed by Willi Anderson ([id number]) on 7/16/2025 10:04:23 AM    Transthoracic Echo Complete  Result Date: 7/14/2025           [place] [address]            Phone [phone] TRANSTHORACIC ECHOCARDIOGRAM REPORT Patient Name:       RITA PABLO MAVERICK Reading Physician:    [id number] Willi Anderson MD Study Date:         7/14/2025            Ordering Provider:    [id number] XANDER CEVALLOS MRN/PID:            [id number]             Fellow: Accession#:         [id number]         Nurse: Date of Birth/Age:  1975 / 49      Sonographer:          Darcie hirsch RDCS Gender Assigned at  F                    Additional Staff: Birth: Height:             195.58 cm            Admit Date: Weight:             79.38 kg             Admission Status:     Inpatient -                                                                Routine BSA / BMI:          2.11 m2 / 20.75      Department Location:  [facility]                     kg/m2 Blood Pressure: 146 /74 mmHg Study Type:    TRANSTHORACIC ECHO (TTE) COMPLETE Diagnosis/ICD: Acute and subacute infective endocarditis-I33.0 Indication:    Chronic bacterial endocarditis CPT Codes:     Echo Complete w Full Doppler-[id number] Patient History: Valve Disorders:   Aortic Valve Replacement, Mitral Valve Replacement and                    Tricuspid Valve Repair. Pertinent History: CAD and HTN. Study Detail: The following Echo studies were performed: 2D, M-Mode, Doppler and               color flow.  PHYSICIAN INTERPRETATION: Left Ventricle: Left ventricular ejection fraction is normal by visual  estimate at 60%. There are no regional wall motion abnormalities. The left ventricular cavity size is normal. There is mild increased septal and mildly increased posterior left ventricular wall thickness. There is left ventricular concentric remodeling. Spectral Doppler shows a Grade I (impaired relaxation pattern) of left ventricular diastolic filling with normal left atrial filling pressure. Left Atrium: The left atrial size is moderately dilated. Right Ventricle: The right ventricle is mildly enlarged. There is normal right ventricular global systolic function. Right Atrium: The right atrial size is mild to moderately dilated. Aortic Valve: There is a prosthetic aortic valve present. The aortic valve area by VTI is 1.63 cmï¿½ with a peak velocity of 3.07 m/s. The peak and mean gradients are 38 mmHg and 18 mmHg, respectively, with a dimensionless index of 0.52. Echo findings are consistent with normal aortic valve prosthesis structure and function. There is no evidence of aortic valve regurgitation. Mitral Valve: There is a prosthetic mitral valve present. The doppler estimated peak and mean diastolic gradients are 19 mmHg and 6 mmHg, respectively. There is a [employer] bioprosthetic type mitral valve prosthesis with a 27 mm reported size. The peak and mean gradients are 18.5 mmHg and 6 mmHg respectively. Echo findings are consistent with normal mitral valve prosthesis structure and function. There is no evidence of mitral valve regurgitation. The E Vmax is 1.83 m/s. Tricuspid Valve: Status post tricuspid valve repair. The doppler estimated peak and mean diastolic gradients are 15.2 mmHg and 6.0 mmHg, respectively. No evidence of tricuspid regurgitation. The Doppler estimated right ventricular systolic pressure (RVSP) is moderately elevated at 63 mmHg. Pulmonic Valve: The pulmonic valve is not well visualized. There is trace pulmonic valve regurgitation. Pericardium: No pericardial effusion noted. Aorta: The aortic  root was not well visualized. Systemic Veins: The inferior vena cava appears normal in size, IVC inspiratory collapse is not well visualized. In comparison to the previous echocardiogram(s): Compared with study dated 12/6/2024,.  CONCLUSIONS:  1. Left ventricular ejection fraction is normal by visual estimate at 60%.  2. Spectral Doppler shows a Grade I (impaired relaxation pattern) of left ventricular diastolic filling with normal left atrial filling pressure.  3. There is normal right ventricular global systolic function.  4. Mildly enlarged right ventricle.  5. The left atrial size is moderately dilated.  6. The right atrial size is mild to moderately dilated.  7. There is a [employer] Devonte bioprosthetic type mitral valve prosthesis with a 27 mm reported size. The peak and mean gradients are 18.5 mmHg and 6 mmHg respectively.  8. Status post tricuspid valve repair.  9. The Doppler estimated RVSP is moderately elevated at 63 mmHg. 10. Echo findings are consistent with normal mitral valve prosthesis structure and function. 11. Normal aortic valve prosthesis structure and function. QUANTITATIVE DATA SUMMARY:  2D MEASUREMENTS:             Normal Ranges: LAs:             2.90 cm     (2.7-4.0cm) IVSd:            1.00 cm     (0.6-1.1cm) LVPWd:           1.02 cm     (0.6-1.1cm) LVIDd:           3.82 cm     (3.9-5.9cm) LVIDs:           2.60 cm LV Mass Index:   56.8 g/m2 LVEDV Index:     40.25 ml/m2 LV % FS          31.9 %  LEFT ATRIUM:                  Normal Ranges: LA Vol A4C:        37.9 ml    (22+/-6mL/m2) LA Vol A2C:        78.7 ml LA Vol BP:         56.2 ml LA Vol Index A4C:  17.9ml/m2 LA Vol Index A2C:  37.2 ml/m2 LA Vol Index BP:   26.6 ml/m2 LA Area A4C:       15.2 cm2 LA Area A2C:       21.3 cm2 LA Major Axis A4C: 5.2 cm LA Major Axis A2C: 4.9 cm LA Vol A4C:        34.6 ml LA Vol A2C:        72.3 ml LA Vol Index BSA:  25.3 ml/m2  RIGHT ATRIUM:                 Normal Ranges: RA Vol A4C:        38.4 ml    (8.3-19.5ml) RA  Vol Index A4C:  18.2 ml/m2 RA Area A4C:       13.8 cm2 RA Major Axis A4C: 4.2 cm  M-MODE MEASUREMENTS:         Normal Ranges: Ao Root:             2.60 cm (2.0-3.7cm) LAs:                 3.40 cm (2.7-4.0cm)  AORTA MEASUREMENTS:         Normal Ranges: Asc Ao, d:          3.10 cm (2.1-3.4cm)  LV SYSTOLIC FUNCTION:                      Normal Ranges: EF-A4C View:    59 % (>=55%) EF-A2C View:    59 % EF-Biplane:     59 % EF-Visual:      60 % LV EF Reported: 60 %  LV DIASTOLIC FUNCTION:             Normal Ranges: MV Peak E:             1.83 m/s    (0.7-1.2 m/s) MV Peak A:             0.82 m/s    (0.42-0.7 m/s) E/A Ratio:             2.23        (1.0-2.2) PulmV Sys Jamarcus:         39.80 cm/s PulmV Martinez Jamarcus:        36.00 cm/s PulmV S/D Jamarcus:         1.10 PulmV A Revs Jamarcus:      19.70 cm/s PulmV A Revs Dur:      151.00 msec  MITRAL VALVE:           Normal Ranges: MV Vmax:      2.15 m/s  (<=1.3m/s) MV peak P.5 mmHg (<5mmHg) MV mean P.7 mmHg  (<48mmHg) MV DT:        296 msec  (150-240msec)  AORTIC VALVE:                      Normal Ranges: AoV Vmax:                3.07 m/s  (<=1.7m/s) AoV Peak P.7 mmHg (<20mmHg) AoV Mean P.0 mmHg (1.7-11.5mmHg) LVOT Max Jamarcus:            1.53 m/s  (<=1.1m/s) AoV VTI:                 65.90 cm  (18-25cm) LVOT VTI:                34.20 cm LVOT Diameter:           2.00 cm   (1.8-2.4cm) AoV Area, VTI:           1.63 cm2  (2.5-5.5cm2) AoV Area,Vmax:           1.57 cm2  (2.5-4.5cm2) AoV Dimensionless Index: 0.52  RIGHT VENTRICLE: RV Basal 3.22 cm RV Mid   2.62 cm RV Major 8.9 cm TAPSE:   24.0 mm  TRICUSPID VALVE/RVSP:           Normal Ranges: Peak TR Velocity:     3.87 m/s TV Vmax               1.95 m/s Est. RA Pressure:     3 mmHg RV Syst Pressure:     63 mmHg   (< 30mmHg) TV mean P.0 mmHg TV Peak PG:           15.2 mmHg TV VTI:               57.20 cm IVC Diam:             1.35 cm  PULMONIC VALVE:           Normal Ranges: PV Max Jamarcus:     2.3  m/s   (0.6-0.9m/s) PV Max P.5 mmHg  PULMONARY VEINS: PulmV A Revs Dur: 151.00 msec PulmV A Revs Jamarcus: 19.70 cm/s PulmV Martinez Jamarcus:   36.00 cm/s PulmV S/D Jamarcus:    1.10 PulmV Sys Jamarcus:    39.80 cm/s  [id number] Willi Anderson MD Electronically signed on 2025 at 11:52:47 AM  ** Final **     IR CVC exchange  Result Date: 7/10/2025  Interpreted By:  Chris Lott, STUDY: IR CVC EXCHANGE; 7/3/2025 4:17 pm   INDICATION: End-stage renal disease. Externalization of cuff of tunneled central venous catheter in right groin.   COMPARISON: None   ACCESSION NUMBER(S): [id number]   ORDERING CLINICIAN: SADIE BAUTISTA   TECHNIQUE: Exchange of tunneled central venous catheter without port. Fluoroscopy time 0.1 minutes; dose 0.9 mGy air kerma.   FINDINGS: Informed consent obtained. Patient positioned supine and connected to physiologic monitoring. Intravenous sedation provided by anesthesiology. All elements of maximal sterile barrier were utilized including cap, mask, sterile gown, sterile gloves, large sterile drape, hand scrub, 2% chlorhexidine for cleaning the right groin-upper thigh and indwelling catheter. Skin and subcutaneous tract around insertion site anesthetized with lidocaine. Using blunt dissection as needed, the indwelling catheter was removed over a guidewire. A new 14.5 French x 44 cm double-lumen cuffed catheter (Palindrome) was advanced over the wire with tip positioned near inferior cavoatrial junction. Both lumens aspirated and flushed freely, locked with heparin. Catheter secured and covered with dressing. Patient tolerated procedure well.       Exchange of tunneled central venous hemodialysis catheter. The catheter is ready for use.     Signed by: Chris Lott 7/10/2025 11:07 AM Dictation workstation:   [id number]     Assessment/Plan   Fungemia-most likely line related-s/p removal of right thigh hemodialysis catheter replacement over guidewire, positive blood culture on 7/10/2025, blood culture on  7/12/2025, pending  Positive blood culture for Staphylococcus epidermidis-contaminant versus bloodstream infection-patient has history of endocarditis-negative transthoracic echocardiogram  Fever-etiology unclear-negative coronavirus/influenza/RSV PCR  Interval CT chest abdomen pelvis without contrast remarkable for bilateral pleural effusion, loculated, with atelectasis versus airspace opacities     Continue IV Micafungin  IV vancomycin-empiric  Nasal MRSA PCR  IV Zosyn-empiric  IV doxycycline  Mycoplasma IgM  Pulmonary consult regarding pleural effusions  Follow-up repeat blood cultures-7/12/2025  Supportive care  Interventional radiology consult-placement of central line for IV micafungin-previously discussed with Dr. Lott (Per Dr Steinberg 7/15/25)  Monitor temperature and WBC  Long-term plan is 14 days of micafungin-7/24/2025  Weekly CBC with differential, CMP while on antibiotic therapy  Ideally, patient should have ophthalmologic evaluation to rule out endophthalmitis-can be done postdischarge as outpatient     Time spent reviewing chart, examining patient, formulating management plan is 35 minutes     This is a complex infectious disease issue and the following was performed today (for more details please see the above note): Management decisions reflecting the added complexity (e.g., changes in antimicrobial therapy, infection control strategies).     Cordell Steinberg MD

## 2025-07-17 NOTE — PROGRESS NOTES
"Batsheva Page is a 49 y.o. female on day 6 of admission presenting with No Principal Problem: There is no principal problem currently on the Problem List. Please update the Problem List and refresh..    Subjective   Seen for end-stage kidney disease and dialysis therapy patient is being treated for bacteremia with Staph epidermidis and fungemia she is on antibiotic therapy ordered by infectious disease unfortunately the patient did spike temperature last night up to 39 and does not feel well       Objective     Physical Exam  Neck:      Vascular: No carotid bruit.     Cardiovascular:      Rate and Rhythm: Normal rate and regular rhythm.      Heart sounds: Murmur (Systolic ejection murmur at the left sternal border) heard.      No friction rub. No gallop.   Pulmonary:      Breath sounds: No wheezing, rhonchi or rales.   Chest:      Chest wall: No tenderness.   Abdominal:      General: There is no distension.      Tenderness: There is no abdominal tenderness. There is no guarding or rebound.     Musculoskeletal:         General: No swelling or tenderness.      Cervical back: Neck supple.      Right lower leg: No edema.      Left lower leg: No edema.   Lymphadenopathy:      Cervical: No cervical adenopathy.         Last Recorded Vitals  Blood pressure 148/81, pulse 86, temperature (!) 39 °C (102.2 °F), temperature source Oral, resp. rate 18, height 1.651 m (5' 5\"), weight 69.6 kg (153 lb 7 oz), SpO2 93%.    Intake/Output last 3 Shifts:  I/O last 3 completed shifts:  In: 1600 (23 mL/kg) [I.V.:1200 (17.2 mL/kg); Other:400]  Out: 4800 (69 mL/kg) [Other:4800]  Weight: 69.6 kg   Current Medications[1]   Relevant Results    Results for orders placed or performed during the hospital encounter of 07/11/25 (from the past 96 hours)   CBC   Result Value Ref Range    WBC 8.3 4.4 - 11.3 x10*3/uL    nRBC 0.0 0.0 - 0.0 /100 WBCs    RBC 3.02 (L) 4.00 - 5.20 x10*6/uL    Hemoglobin 9.3 (L) 12.0 - 16.0 g/dL    Hematocrit 30.7 (L) " 36.0 - 46.0 %     (H) 80 - 100 fL    MCH 30.8 26.0 - 34.0 pg    MCHC 30.3 (L) 32.0 - 36.0 g/dL    RDW 16.4 (H) 11.5 - 14.5 %    Platelets 196 150 - 450 x10*3/uL   Basic Metabolic Panel   Result Value Ref Range    Glucose 89 74 - 99 mg/dL    Sodium 133 (L) 136 - 145 mmol/L    Potassium 5.3 3.5 - 5.3 mmol/L    Chloride 94 (L) 98 - 107 mmol/L    Bicarbonate 22 21 - 32 mmol/L    Anion Gap 22 (H) 10 - 20 mmol/L    Urea Nitrogen 60 (H) 6 - 23 mg/dL    Creatinine 10.67 (H) 0.50 - 1.05 mg/dL    eGFR 4 (L) >60 mL/min/1.73m*2    Calcium 7.4 (L) 8.6 - 10.3 mg/dL   Transthoracic Echo Complete   Result Value Ref Range    AV pk peggy 3.07 m/s    LVOT diam 2.00 cm    AV mn grad 18 mmHg    MV E/A ratio 2.23     Tricuspid annular plane systolic excursion 2.4 cm    LV Biplane EF 59 %    LA vol index A/L 26.6 ml/m2    LV EF 60 %    RVSP 63 mmHg    LVIDd 3.82 cm    AV pk grad 38 mmHg    Aortic Valve Area by Continuity of VTI 1.63 cm2    Aortic Valve Area by Continuity of Peak Velocity 1.57 cm2    LV A4C EF 59.1    POCT GLUCOSE   Result Value Ref Range    POCT Glucose 78 74 - 99 mg/dL   Basic Metabolic Panel   Result Value Ref Range    Glucose 82 74 - 99 mg/dL    Sodium 134 (L) 136 - 145 mmol/L    Potassium 5.0 3.5 - 5.3 mmol/L    Chloride 95 (L) 98 - 107 mmol/L    Bicarbonate 22 21 - 32 mmol/L    Anion Gap 22 (H) 10 - 20 mmol/L    Urea Nitrogen 33 (H) 6 - 23 mg/dL    Creatinine 7.54 (H) 0.50 - 1.05 mg/dL    eGFR 6 (L) >60 mL/min/1.73m*2    Calcium 7.7 (L) 8.6 - 10.3 mg/dL   CBC and Auto Differential   Result Value Ref Range    WBC 10.0 4.4 - 11.3 x10*3/uL    nRBC 0.0 0.0 - 0.0 /100 WBCs    RBC 2.92 (L) 4.00 - 5.20 x10*6/uL    Hemoglobin 9.2 (L) 12.0 - 16.0 g/dL    Hematocrit 29.8 (L) 36.0 - 46.0 %     (H) 80 - 100 fL    MCH 31.5 26.0 - 34.0 pg    MCHC 30.9 (L) 32.0 - 36.0 g/dL    RDW 16.7 (H) 11.5 - 14.5 %    Platelets 212 150 - 450 x10*3/uL    Neutrophils % 89.2 40.0 - 80.0 %    Immature Granulocytes %, Automated 0.4 0.0 -  0.9 %    Lymphocytes % 4.3 13.0 - 44.0 %    Monocytes % 5.6 2.0 - 10.0 %    Eosinophils % 0.1 0.0 - 6.0 %    Basophils % 0.4 0.0 - 2.0 %    Neutrophils Absolute 8.93 (H) 1.20 - 7.70 x10*3/uL    Immature Granulocytes Absolute, Automated 0.04 0.00 - 0.70 x10*3/uL    Lymphocytes Absolute 0.43 (L) 1.20 - 4.80 x10*3/uL    Monocytes Absolute 0.56 0.10 - 1.00 x10*3/uL    Eosinophils Absolute 0.01 0.00 - 0.70 x10*3/uL    Basophils Absolute 0.04 0.00 - 0.10 x10*3/uL   POCT GLUCOSE   Result Value Ref Range    POCT Glucose 82 74 - 99 mg/dL   POCT GLUCOSE   Result Value Ref Range    POCT Glucose 73 (L) 74 - 99 mg/dL   Basic Metabolic Panel   Result Value Ref Range    Glucose 77 74 - 99 mg/dL    Sodium 135 (L) 136 - 145 mmol/L    Potassium 5.1 3.5 - 5.3 mmol/L    Chloride 94 (L) 98 - 107 mmol/L    Bicarbonate 23 21 - 32 mmol/L    Anion Gap 23 (H) 10 - 20 mmol/L    Urea Nitrogen 44 (H) 6 - 23 mg/dL    Creatinine 8.78 (H) 0.50 - 1.05 mg/dL    eGFR 5 (L) >60 mL/min/1.73m*2    Calcium 7.5 (L) 8.6 - 10.3 mg/dL   CBC and Auto Differential   Result Value Ref Range    WBC 7.3 4.4 - 11.3 x10*3/uL    nRBC 0.0 0.0 - 0.0 /100 WBCs    RBC 3.05 (L) 4.00 - 5.20 x10*6/uL    Hemoglobin 9.5 (L) 12.0 - 16.0 g/dL    Hematocrit 30.0 (L) 36.0 - 46.0 %    MCV 98 80 - 100 fL    MCH 31.1 26.0 - 34.0 pg    MCHC 31.7 (L) 32.0 - 36.0 g/dL    RDW 16.2 (H) 11.5 - 14.5 %    Platelets 193 150 - 450 x10*3/uL    Neutrophils % 75.3 40.0 - 80.0 %    Immature Granulocytes %, Automated 0.3 0.0 - 0.9 %    Lymphocytes % 9.8 13.0 - 44.0 %    Monocytes % 9.0 2.0 - 10.0 %    Eosinophils % 4.9 0.0 - 6.0 %    Basophils % 0.7 0.0 - 2.0 %    Neutrophils Absolute 5.51 1.20 - 7.70 x10*3/uL    Immature Granulocytes Absolute, Automated 0.02 0.00 - 0.70 x10*3/uL    Lymphocytes Absolute 0.72 (L) 1.20 - 4.80 x10*3/uL    Monocytes Absolute 0.66 0.10 - 1.00 x10*3/uL    Eosinophils Absolute 0.36 0.00 - 0.70 x10*3/uL    Basophils Absolute 0.05 0.00 - 0.10 x10*3/uL   POCT GLUCOSE    Result Value Ref Range    POCT Glucose 91 74 - 99 mg/dL   Basic Metabolic Panel   Result Value Ref Range    Glucose 88 74 - 99 mg/dL    Sodium 135 (L) 136 - 145 mmol/L    Potassium 4.2 3.5 - 5.3 mmol/L    Chloride 96 (L) 98 - 107 mmol/L    Bicarbonate 24 21 - 32 mmol/L    Anion Gap 19 10 - 20 mmol/L    Urea Nitrogen 22 6 - 23 mg/dL    Creatinine 5.97 (H) 0.50 - 1.05 mg/dL    eGFR 8 (L) >60 mL/min/1.73m*2    Calcium 8.0 (L) 8.6 - 10.3 mg/dL   CBC and Auto Differential   Result Value Ref Range    WBC 8.0 4.4 - 11.3 x10*3/uL    nRBC 0.0 0.0 - 0.0 /100 WBCs    RBC 3.61 (L) 4.00 - 5.20 x10*6/uL    Hemoglobin 11.0 (L) 12.0 - 16.0 g/dL    Hematocrit 35.8 (L) 36.0 - 46.0 %    MCV 99 80 - 100 fL    MCH 30.5 26.0 - 34.0 pg    MCHC 30.7 (L) 32.0 - 36.0 g/dL    RDW 16.1 (H) 11.5 - 14.5 %    Platelets 177 150 - 450 x10*3/uL    Neutrophils % 85.4 40.0 - 80.0 %    Immature Granulocytes %, Automated 0.4 0.0 - 0.9 %    Lymphocytes % 6.0 13.0 - 44.0 %    Monocytes % 7.2 2.0 - 10.0 %    Eosinophils % 0.5 0.0 - 6.0 %    Basophils % 0.5 0.0 - 2.0 %    Neutrophils Absolute 6.87 1.20 - 7.70 x10*3/uL    Immature Granulocytes Absolute, Automated 0.03 0.00 - 0.70 x10*3/uL    Lymphocytes Absolute 0.48 (L) 1.20 - 4.80 x10*3/uL    Monocytes Absolute 0.58 0.10 - 1.00 x10*3/uL    Eosinophils Absolute 0.04 0.00 - 0.70 x10*3/uL    Basophils Absolute 0.04 0.00 - 0.10 x10*3/uL   Comprehensive metabolic panel   Result Value Ref Range    Glucose 88 74 - 99 mg/dL    Sodium 135 (L) 136 - 145 mmol/L    Potassium 4.2 3.5 - 5.3 mmol/L    Chloride 96 (L) 98 - 107 mmol/L    Bicarbonate 24 21 - 32 mmol/L    Anion Gap 19 10 - 20 mmol/L    Urea Nitrogen 22 6 - 23 mg/dL    Creatinine 5.97 (H) 0.50 - 1.05 mg/dL    eGFR 8 (L) >60 mL/min/1.73m*2    Calcium 8.0 (L) 8.6 - 10.3 mg/dL    Albumin 3.7 3.4 - 5.0 g/dL    Alkaline Phosphatase 107 33 - 110 U/L    Total Protein 8.4 (H) 6.4 - 8.2 g/dL    AST 21 9 - 39 U/L    Bilirubin, Total 0.7 0.0 - 1.2 mg/dL    ALT 5 (L) 7  - 45 U/L   POCT GLUCOSE   Result Value Ref Range    POCT Glucose 117 (H) 74 - 99 mg/dL       Assessment/Plan   End-stage kidney diseas plan to continue dialysis Monday Wednesday and Friday  Bacteremia with Staph epidermidis antibiotics per infectious disease  Fungemia continue antibiotic per infectious disease dialysis catheter was changed  Anemia of chronic kidney disease  Hypertension  History of bacterial endocarditis  Coronary artery disease    This is a very complex case with her recurrent bacteremias and line infections and the lack of veins to place dialysis catheters and the lack for veins to create AV fistulas in this patient with presence of prosthetic valve and persistent fever I feel the patient needs to be transferred to tertiary care  mainly to the [facility] for further treatment and option of peritoneal dialysis I have discussed the case in details with the nurse practitioner who will be making arrangements to transfer patient to [facility]      Zhou Pedersen MD             [1]   Current Facility-Administered Medications:     acetaminophen (Tylenol) tablet 650 mg, 650 mg, oral, q6h PRN, Shira Lim, APRN-CNP, 650 mg at 07/17/25 0530    amLODIPine (Norvasc) tablet 5 mg, 5 mg, oral, Daily, Noelle Doss, APRN-CNP, 5 mg at 07/16/25 1136    aspirin EC tablet 81 mg, 81 mg, oral, Daily, Shira CHUN Amusat, APRN-CNP, 81 mg at 07/16/25 1136    atorvastatin (Lipitor) tablet 40 mg, 40 mg, oral, Nightly, Noelle Doss, APRN-CNP, 40 mg at 07/16/25 2213    calcitriol (Rocaltrol) capsule 0.5 mcg, 0.5 mcg, oral, Daily, Noelle Doss, APRN-CNP, 0.5 mcg at 07/16/25 1136    carvedilol (Coreg) tablet 12.5 mg, 12.5 mg, oral, BID, Noelle Doss, APRN-CNP, 12.5 mg at 07/16/25 2213    cloNIDine (Catapres) tablet 0.3 mg, 0.3 mg, oral, q8h KIMBERLY, Noelle Doss, APRN-CNP, 0.3 mg at 07/17/25 0530    dextrose 50 % injection 12.5 g, 12.5 g, intravenous, q15 min PRN, Anjana Vee, APRN-CNP    dextrose  50 % injection 25 g, 25 g, intravenous, q15 min PRN, ORI Valentin-CNP    diphenhydrAMINE (BENADryl) injection 50 mg, 50 mg, intravenous, q3h PRN, ORI Blake-CNP, 50 mg at 07/17/25 0623    [START ON 7/18/2025] ergocalciferol (Vitamin D-2) capsule 1.25 mg, 1.25 mg, oral, Every Friday, ORI Kincaid-CNP    glucagon (Glucagen) injection 1 mg, 1 mg, intramuscular, q15 min PRN, ORI Valentin-CNP    glucagon (Glucagen) injection 1 mg, 1 mg, intramuscular, q15 min PRN, ORI Valentin-CNP    heparin 1,000 unit/mL injection 1,000 Units, 1,000 Units, intra-catheter, After Dialysis, Zhou Pedersen MD, 2,600 Units at 07/16/25 1033    heparin 1,000 unit/mL injection 1,000 Units, 1,000 Units, intra-catheter, After Dialysis, Zhou Pedersen MD, 2,600 Units at 07/16/25 1033    hydrALAZINE (Apresoline) injection 5 mg, 5 mg, intravenous, q4h PRN, ORI Kincaid-CNP, 5 mg at 07/11/25 2030    HYDROmorphone (Dilaudid) injection 0.4 mg, 0.4 mg, intravenous, q3h PRN, ORI Blake-CNP, 0.4 mg at 07/17/25 0623    ipratropium-albuteroL (Duo-Neb) 0.5-2.5 mg/3 mL nebulizer solution 3 mL, 3 mL, nebulization, TID, Chris Lott MD, 3 mL at 07/17/25 0709    ipratropium-albuteroL (Duo-Neb) 0.5-2.5 mg/3 mL nebulizer solution 3 mL, 3 mL, nebulization, q2h PRN, Chris Lott MD    labetaloL (Normodyne,Trandate) injection 5 mg, 5 mg, intravenous, Once PRN, Masoud Serrato MD    levETIRAcetam (Keppra) tablet 750 mg, 750 mg, oral, Nightly, ANGELA Kincaid, 750 mg at 07/16/25 2213    lidocaine (Xylocaine) 10 mg/mL (1 %) injection 0.1 mL, 0.1 mL, subcutaneous, Once, Masoud Serrato MD    lidocaine 4 % patch 1 patch, 1 patch, transdermal, q24h, ANGELA Kincaid, 1 patch at 07/13/25 2000    LORazepam (Ativan) tablet 0.25 mg, 0.25 mg, oral, q6h PRN, ANGELA Kincaid    methocarbamol (Robaxin) tablet 500 mg, 500 mg, oral, q8h KIMBERLY, ANGELA Kincaid, 500 mg  at 07/17/25 0530    micafungin (Mycamine) 100 mg in dextrose 5% 100 mL IV, 100 mg, intravenous, q24h, Cordell Steinberg MD, Stopped at 07/16/25 2349    oxyCODONE (Roxicodone) immediate release tablet 5 mg, 5 mg, oral, q4h PRN, Masoud Serrato MD    oxygen (O2) therapy, , inhalation, Continuous PRN - O2/gases, Karlo De La Vega MD    pantoprazole (ProtoNix) EC tablet 40 mg, 40 mg, oral, Daily before breakfast, 40 mg at 07/16/25 0620 **OR** pantoprazole (Protonix) injection 40 mg, 40 mg, intravenous, Daily before breakfast, ORI Blake-CNP, 40 mg at 07/17/25 0622    patiromer calcium sorbitex (Veltassa) packet 8.4 g, 1 packet, oral, Daily, Jerrod Pickett MD    pregabalin (Lyrica) capsule 75 mg, 75 mg, oral, Daily, ORI Kincaid-CNP, 75 mg at 07/16/25 1136    sodium chloride 3 % nebulizer solution 3 mL, 3 mL, nebulization, q6h while awake, ORI Goff-CNP, 3 mL at 07/17/25 0709

## 2025-07-17 NOTE — PROGRESS NOTES
07/17/25 1529   Discharge Planning   Expected Discharge Disposition Short Term A  (transfer to [facility])

## 2025-07-17 NOTE — NURSING NOTE
Hospitalist notified regarding elevated temp. New order for blood culture. Will continue to follow plan of care.

## 2025-07-17 NOTE — NURSING NOTE
Assumed care of patient at 1900. Patient in bed. Visitor at bedside. Will continue to follow plan of care.

## 2025-07-17 NOTE — NURSING NOTE
Patient sitting at the end of bed refusing to place o2 back on. Stating she will wait till Hospitalist comes to bedside to talk to her. Shira notified, educated patient on importance of o2 placement with patient previously desatting.

## 2025-07-17 NOTE — PROGRESS NOTES
Batsheva Page is a 49 y.o. female on day 6 of admission presenting with No Principal Problem: There is no principal problem currently on the Problem List. Please update the Problem List and refresh..      Subjective   Laying in bed. States she feels tired.        Objective     Last Recorded Vitals  /81 (BP Location: Left arm, Patient Position: Lying)   Pulse 86   Temp (!) 38.1 °C (100.6 °F) (Oral)   Resp 18   Wt 69.6 kg (153 lb 7 oz)   SpO2 93%   Intake/Output last 3 Shifts:    Intake/Output Summary (Last 24 hours) at 7/17/2025 1006  Last data filed at 7/17/2025 0632  Gross per 24 hour   Intake 1200 ml   Output 4800 ml   Net -3600 ml       Admission Weight  Weight: 67.1 kg (148 lb) (07/11/25 1327)    Daily Weight  07/15/25 : 69.6 kg (153 lb 7 oz)    Image Results  IR CVC exchange  Narrative: Interpreted By:  Chris Lott,   STUDY:  IR CVC EXCHANGE; 7/11/2025 5:42 pm      INDICATION:  Tunneled dialysis catheter cuff out. Fungemia      COMPARISON:  None      ACCESSION NUMBER(S):  [id number]      ORDERING CLINICIAN:  PAT BRYAN      TECHNIQUE:  Exchange of tunneled central venous catheter without port.      FINDINGS:  Informed consent obtained. Patient positioned supine and connected to  physiologic monitoring. Intravenous sedation provided by  anesthesiology.. All elements of maximal sterile barrier were  utilized including cap, mask, sterile gown, sterile gloves, large  sterile drape, hand scrub, 2% chlorhexidine for cleaning the right  chest wall and indwelling catheter. Skin and subcutaneous tract  around insertion site anesthetized with lidocaine. Indwelling right  upper thigh catheter was removed over Amplatz wire. Given patient's  history of very poor remaining vascular access, decision made to  maintain access at current right common femoral site by advancement  of a 12 Bulgarian sheath over the wire. The sheath was secured with a  suture and covered with a dressing.      Impression:  Over-the-wire removal of tunneled central venous catheter and right  upper thigh with insertion of a sheath to maintain venous access.  This will be used to reinserted Tunneled line widens blood cultures  clear.          Signed by: Chris Lott 7/16/2025 1:27 PM  Dictation workstation:   [id number]  Electrocardiogram, 12-lead PRN ACS symptoms  Normal sinus rhythm  Nonspecific ST abnormality  Abnormal ECG  No previous ECGs available  Confirmed by Willi Anderson ([id number]) on 7/16/2025 10:04:23 AM      Physical Exam  Vitals and nursing note reviewed.   Constitutional:       Appearance: Normal appearance.   HENT:      Head: Normocephalic and atraumatic.      Right Ear: External ear normal.      Left Ear: External ear normal.      Nose: Nose normal.      Mouth/Throat:      Mouth: Mucous membranes are moist.     Eyes:      Extraocular Movements: Extraocular movements intact.      Conjunctiva/sclera: Conjunctivae normal.      Pupils: Pupils are equal, round, and reactive to light.       Cardiovascular:      Rate and Rhythm: Normal rate.      Pulses: Normal pulses.      Heart sounds: Normal heart sounds.   Pulmonary:      Effort: Pulmonary effort is normal.      Breath sounds: Normal breath sounds.   Abdominal:      General: Bowel sounds are normal.      Palpations: Abdomen is soft.     Musculoskeletal:         General: Normal range of motion.      Cervical back: Normal range of motion.     Skin:     General: Skin is warm and dry.      Capillary Refill: Capillary refill takes less than 2 seconds.      Comments: Diffuse scattered areas of hypopigmentation BL-UE   R-fem HD cath dressing CDI     Neurological:      Mental Status: She is oriented to person, place, and time.         Relevant Results          This patient has a central line   Reason for the central line remaining today? Dialysis/Hemapheresis and Hemodynamic monitoring      Assessment & Plan  ESRD (end stage renal disease) on dialysis (Multi)    Palliative care  encounter      Bacteremia  Blood cultures 7/10/2025 + yeast and Staphylococcus epidermidis  Now newly Febrile- Blood cultures 7/16/2025 pending  Continue ABX and antifungal per ID  Pending IR placement of central line for IV Micafungin  Follow cultures     Displaced HD catheter   ESRD on hemodialysis  S/p replacement 7/14/25 w/IR  Renally dose meds, avoid nephrotoxic medications  Interested in talking to Dr. Pedersen about peritoneal dialysis options   Nephrology following     Seizure  Continue home medication  Seizure precautions     HTN  Continue home BP meds  Monitor BP      CAD  Aspirin/statin     Anxiety/depression  Continue home medications    DVT/GI  SCD, PPI     Disposition: TBD    7/16/25: Seen on floor after HD, tolerated well. No specific complaints at the moment. Pending IR placement of central line for IV micafungin. Appreciate ongoing reccs from specialists.     7/17/25: S/p R-femoral HD catheter 7/14/25. Became newly febrile yesterday. Blood cultures obtained yesterday per ID pending. Case discussed with nephrology and ID: will initiate transfer to [facility] for higher level of care with concern for recurrent bacteremia and possible need for acute PD.  Addendum 1550: Accepted to [facility], transfer pending bed availability.   Addendum 4101: Patient re-assessed w/attending physician. On exam patient awake, somewhat drowsy and disoriented, but redirectable, 92% ~5L, Asking for more pain medication. Discussed w/attending and nursing: change IV narcotics and benadryl to PO. Q4h vitals. CT reviewed: pulmonology consult.            Shira Lim, ORI-CNP

## 2025-07-17 NOTE — NURSING NOTE
Shira notified patient fever returned, and patient o2 @ 56%. RT called, o2 increased, and tylenol given.

## 2025-07-17 NOTE — NURSING NOTE
Attempted to place patient back in the bed, patient refused to listen to this nurse and the PCA. Patiently actively nodding off at the side of the bed. Placed o2 back on patient pulse ox read 76 on room air.

## 2025-07-17 NOTE — CARE PLAN
The patient's goals for the shift include      The clinical goals for the shift include pain management    Problem: Pain - Adult  Goal: Verbalizes/displays adequate comfort level or baseline comfort level  Outcome: Progressing     Problem: Safety - Adult  Goal: Free from fall injury  Outcome: Progressing     Problem: Discharge Planning  Goal: Discharge to home or other facility with appropriate resources  Outcome: Progressing     Problem: Chronic Conditions and Co-morbidities  Goal: Patient's chronic conditions and co-morbidity symptoms are monitored and maintained or improved  Outcome: Progressing     Problem: Nutrition  Goal: Nutrient intake appropriate for maintaining nutritional needs  Outcome: Progressing     Problem: Fall/Injury  Goal: Not fall by end of shift  Outcome: Progressing  Goal: Be free from injury by end of the shift  Outcome: Progressing  Goal: Verbalize understanding of personal risk factors for fall in the hospital  Outcome: Progressing  Goal: Verbalize understanding of risk factor reduction measures to prevent injury from fall in the home  Outcome: Progressing  Goal: Use assistive devices by end of the shift  Outcome: Progressing  Goal: Pace activities to prevent fatigue by end of the shift  Outcome: Progressing     Problem: Pain  Goal: Takes deep breaths with improved pain control throughout the shift  Outcome: Progressing  Goal: Turns in bed with improved pain control throughout the shift  Outcome: Progressing  Goal: Walks with improved pain control throughout the shift  Outcome: Progressing  Goal: Performs ADL's with improved pain control throughout shift  Outcome: Progressing  Goal: Participates in PT with improved pain control throughout the shift  Outcome: Progressing  Goal: Free from opioid side effects throughout the shift  Outcome: Progressing  Goal: Free from acute confusion related to pain meds throughout the shift  Outcome: Progressing     Problem: Skin  Goal: Decreased wound  size/increased tissue granulation at next dressing change  Outcome: Progressing  Flowsheets (Taken 7/17/2025 1429)  Decreased wound size/increased tissue granulation at next dressing change: Protective dressings over bony prominences  Goal: Participates in plan/prevention/treatment measures  Outcome: Progressing  Flowsheets (Taken 7/17/2025 1429)  Participates in plan/prevention/treatment measures:   Elevate heels   Increase activity/out of bed for meals  Goal: Prevent/manage excess moisture  Outcome: Progressing  Flowsheets (Taken 7/17/2025 1429)  Prevent/manage excess moisture:   Monitor for/manage infection if present   Moisturize dry skin  Goal: Prevent/minimize sheer/friction injuries  Outcome: Progressing  Flowsheets (Taken 7/17/2025 1429)  Prevent/minimize sheer/friction injuries:   HOB 30 degrees or less   Use pull sheet  Goal: Promote/optimize nutrition  Outcome: Progressing  Flowsheets (Taken 7/17/2025 1429)  Promote/optimize nutrition:   Offer water/supplements/favorite foods   Monitor/record intake including meals  Goal: Promote skin healing  Outcome: Progressing  Flowsheets (Taken 7/17/2025 1429)  Promote skin healing:   Assess skin/pad under line(s)/device(s)   Protective dressings over bony prominences     Problem: Respiratory  Goal: Clear secretions with interventions this shift  Outcome: Progressing  Goal: Minimize anxiety/maximize coping throughout shift  Outcome: Progressing  Goal: Minimal/no exertional discomfort or dyspnea this shift  Outcome: Progressing  Goal: No signs of respiratory distress (eg. Use of accessory muscles. Peds grunting)  Outcome: Progressing

## 2025-07-18 ENCOUNTER — ANESTHESIA (OUTPATIENT)
Dept: CARDIOLOGY | Facility: HOSPITAL | Age: 50
End: 2025-07-18

## 2025-07-18 ENCOUNTER — APPOINTMENT (OUTPATIENT)
Dept: NEUROLOGY | Facility: HOSPITAL | Age: 50
DRG: 871 | End: 2025-07-18
Payer: MEDICARE

## 2025-07-18 DIAGNOSIS — B49 FUNGEMIA: ICD-10-CM

## 2025-07-18 PROBLEM — E21.3 HYPERPARATHYROIDISM (MULTI): Status: ACTIVE | Noted: 2025-07-18

## 2025-07-18 PROBLEM — G89.29 CHRONIC PAIN: Status: ACTIVE | Noted: 2025-07-18

## 2025-07-18 PROBLEM — R01.1 MURMUR, CARDIAC: Status: ACTIVE | Noted: 2025-07-18

## 2025-07-18 LAB
ALBUMIN SERPL BCP-MCNC: 3.2 G/DL (ref 3.4–5)
ANION GAP SERPL CALC-SCNC: 18 MMOL/L (ref 10–20)
BUN SERPL-MCNC: 37 MG/DL (ref 6–23)
CALCIUM SERPL-MCNC: 7.3 MG/DL (ref 8.6–10.6)
CHLORIDE SERPL-SCNC: 95 MMOL/L (ref 98–107)
CO2 SERPL-SCNC: 25 MMOL/L (ref 21–32)
CREAT SERPL-MCNC: 8.28 MG/DL (ref 0.5–1.05)
EGFRCR SERPLBLD CKD-EPI 2021: 5 ML/MIN/1.73M*2
ERYTHROCYTE [DISTWIDTH] IN BLOOD BY AUTOMATED COUNT: 16.4 % (ref 11.5–14.5)
GLUCOSE BLD MANUAL STRIP-MCNC: 110 MG/DL (ref 74–99)
GLUCOSE BLD MANUAL STRIP-MCNC: 116 MG/DL (ref 74–99)
GLUCOSE BLD MANUAL STRIP-MCNC: 133 MG/DL (ref 74–99)
GLUCOSE BLD MANUAL STRIP-MCNC: 139 MG/DL (ref 74–99)
GLUCOSE BLD MANUAL STRIP-MCNC: 152 MG/DL (ref 74–99)
GLUCOSE SERPL-MCNC: 101 MG/DL (ref 74–99)
HBV SURFACE AB SER-ACNC: 630.9 MIU/ML
HBV SURFACE AG SERPL QL IA: NONREACTIVE
HCT VFR BLD AUTO: 27.1 % (ref 36–46)
HGB BLD-MCNC: 8.2 G/DL (ref 12–16)
LEVETIRACETAM SERPL-MCNC: 8 UG/ML (ref 10–40)
MAGNESIUM SERPL-MCNC: 1.85 MG/DL (ref 1.6–2.4)
MCH RBC QN AUTO: 29.9 PG (ref 26–34)
MCHC RBC AUTO-ENTMCNC: 30.3 G/DL (ref 32–36)
MCV RBC AUTO: 99 FL (ref 80–100)
NRBC BLD-RTO: 0 /100 WBCS (ref 0–0)
P MIRABILIS DNA BLD POS QL NAA+PROBE: DETECTED
PHOSPHATE SERPL-MCNC: 3.9 MG/DL (ref 2.5–4.9)
PLATELET # BLD AUTO: 117 X10*3/UL (ref 150–450)
POTASSIUM SERPL-SCNC: 4.2 MMOL/L (ref 3.5–5.3)
RBC # BLD AUTO: 2.74 X10*6/UL (ref 4–5.2)
SODIUM SERPL-SCNC: 134 MMOL/L (ref 136–145)
VANCOMYCIN SERPL-MCNC: 28.9 UG/ML (ref 5–20)
WBC # BLD AUTO: 8.4 X10*3/UL (ref 4.4–11.3)

## 2025-07-18 PROCEDURE — 99239 HOSP IP/OBS DSCHRG MGMT >30: CPT

## 2025-07-18 PROCEDURE — 83735 ASSAY OF MAGNESIUM: CPT | Performed by: STUDENT IN AN ORGANIZED HEALTH CARE EDUCATION/TRAINING PROGRAM

## 2025-07-18 PROCEDURE — 87340 HEPATITIS B SURFACE AG IA: CPT | Performed by: INTERNAL MEDICINE

## 2025-07-18 PROCEDURE — 5A1D70Z PERFORMANCE OF URINARY FILTRATION, INTERMITTENT, LESS THAN 6 HOURS PER DAY: ICD-10-PCS | Performed by: INTERNAL MEDICINE

## 2025-07-18 PROCEDURE — 80069 RENAL FUNCTION PANEL: CPT | Performed by: STUDENT IN AN ORGANIZED HEALTH CARE EDUCATION/TRAINING PROGRAM

## 2025-07-18 PROCEDURE — 87077 CULTURE AEROBIC IDENTIFY: CPT | Performed by: STUDENT IN AN ORGANIZED HEALTH CARE EDUCATION/TRAINING PROGRAM

## 2025-07-18 PROCEDURE — 99223 1ST HOSP IP/OBS HIGH 75: CPT | Performed by: STUDENT IN AN ORGANIZED HEALTH CARE EDUCATION/TRAINING PROGRAM

## 2025-07-18 PROCEDURE — 86706 HEP B SURFACE ANTIBODY: CPT | Performed by: INTERNAL MEDICINE

## 2025-07-18 PROCEDURE — 99222 1ST HOSP IP/OBS MODERATE 55: CPT | Performed by: STUDENT IN AN ORGANIZED HEALTH CARE EDUCATION/TRAINING PROGRAM

## 2025-07-18 PROCEDURE — 80202 ASSAY OF VANCOMYCIN: CPT

## 2025-07-18 PROCEDURE — 2500000001 HC RX 250 WO HCPCS SELF ADMINISTERED DRUGS (ALT 637 FOR MEDICARE OP): Performed by: STUDENT IN AN ORGANIZED HEALTH CARE EDUCATION/TRAINING PROGRAM

## 2025-07-18 PROCEDURE — 36415 COLL VENOUS BLD VENIPUNCTURE: CPT | Performed by: INTERNAL MEDICINE

## 2025-07-18 PROCEDURE — 80177 DRUG SCRN QUAN LEVETIRACETAM: CPT | Performed by: STUDENT IN AN ORGANIZED HEALTH CARE EDUCATION/TRAINING PROGRAM

## 2025-07-18 PROCEDURE — 85027 COMPLETE CBC AUTOMATED: CPT | Performed by: STUDENT IN AN ORGANIZED HEALTH CARE EDUCATION/TRAINING PROGRAM

## 2025-07-18 PROCEDURE — 1210000001 HC SEMI-PRIVATE ROOM DAILY

## 2025-07-18 PROCEDURE — 2500000004 HC RX 250 GENERAL PHARMACY W/ HCPCS (ALT 636 FOR OP/ED): Performed by: STUDENT IN AN ORGANIZED HEALTH CARE EDUCATION/TRAINING PROGRAM

## 2025-07-18 PROCEDURE — 36415 COLL VENOUS BLD VENIPUNCTURE: CPT | Performed by: STUDENT IN AN ORGANIZED HEALTH CARE EDUCATION/TRAINING PROGRAM

## 2025-07-18 PROCEDURE — 82947 ASSAY GLUCOSE BLOOD QUANT: CPT

## 2025-07-18 PROCEDURE — 2500000004 HC RX 250 GENERAL PHARMACY W/ HCPCS (ALT 636 FOR OP/ED)

## 2025-07-18 RX ORDER — DEXTROSE 50 % IN WATER (D50W) INTRAVENOUS SYRINGE
12.5
Status: DISCONTINUED | OUTPATIENT
Start: 2025-07-18 | End: 2025-07-23 | Stop reason: HOSPADM

## 2025-07-18 RX ORDER — VANCOMYCIN HYDROCHLORIDE 500 MG/100ML
500 INJECTION, SOLUTION INTRAVENOUS ONCE
Status: COMPLETED | OUTPATIENT
Start: 2025-07-18 | End: 2025-07-18

## 2025-07-18 RX ORDER — CARVEDILOL 12.5 MG/1
12.5 TABLET ORAL 2 TIMES DAILY
Status: DISCONTINUED | OUTPATIENT
Start: 2025-07-18 | End: 2025-07-18

## 2025-07-18 RX ORDER — NAPROXEN SODIUM 220 MG/1
81 TABLET, FILM COATED ORAL DAILY
Status: DISCONTINUED | OUTPATIENT
Start: 2025-07-18 | End: 2025-07-23 | Stop reason: HOSPADM

## 2025-07-18 RX ORDER — HYDRALAZINE HYDROCHLORIDE 25 MG/1
25 TABLET, FILM COATED ORAL 3 TIMES DAILY PRN
Status: DISCONTINUED | OUTPATIENT
Start: 2025-07-18 | End: 2025-07-23 | Stop reason: HOSPADM

## 2025-07-18 RX ORDER — PREGABALIN 75 MG/1
75 CAPSULE ORAL DAILY
Status: DISCONTINUED | OUTPATIENT
Start: 2025-07-18 | End: 2025-07-23 | Stop reason: HOSPADM

## 2025-07-18 RX ORDER — LEVETIRACETAM 500 MG/1
750 TABLET ORAL NIGHTLY
Status: DISCONTINUED | OUTPATIENT
Start: 2025-07-18 | End: 2025-07-23 | Stop reason: HOSPADM

## 2025-07-18 RX ORDER — DIPHENHYDRAMINE HCL 25 MG
25 CAPSULE ORAL EVERY 6 HOURS PRN
Status: DISCONTINUED | OUTPATIENT
Start: 2025-07-18 | End: 2025-07-22

## 2025-07-18 RX ORDER — MICAFUNGIN SODIUM 100 MG/5ML
100 INJECTION, POWDER, LYOPHILIZED, FOR SOLUTION INTRAVENOUS EVERY 24 HOURS
Status: DISCONTINUED | OUTPATIENT
Start: 2025-07-18 | End: 2025-07-23 | Stop reason: HOSPADM

## 2025-07-18 RX ORDER — OXYCODONE AND ACETAMINOPHEN 5; 325 MG/1; MG/1
1 TABLET ORAL EVERY 4 HOURS
COMMUNITY
Start: 2025-06-19

## 2025-07-18 RX ORDER — CALCITRIOL 0.5 UG/1
0.5 CAPSULE ORAL DAILY
Status: DISCONTINUED | OUTPATIENT
Start: 2025-07-18 | End: 2025-07-23 | Stop reason: HOSPADM

## 2025-07-18 RX ORDER — ATORVASTATIN CALCIUM 40 MG/1
40 TABLET, FILM COATED ORAL NIGHTLY
Status: DISCONTINUED | OUTPATIENT
Start: 2025-07-18 | End: 2025-07-23 | Stop reason: HOSPADM

## 2025-07-18 RX ORDER — OXYCODONE HYDROCHLORIDE 5 MG/1
5 TABLET ORAL EVERY 6 HOURS PRN
Status: DISCONTINUED | OUTPATIENT
Start: 2025-07-18 | End: 2025-07-23 | Stop reason: HOSPADM

## 2025-07-18 RX ORDER — NALOXONE HYDROCHLORIDE 0.4 MG/ML
0.4 INJECTION, SOLUTION INTRAMUSCULAR; INTRAVENOUS; SUBCUTANEOUS EVERY 5 MIN PRN
Status: DISCONTINUED | OUTPATIENT
Start: 2025-07-18 | End: 2025-07-23 | Stop reason: HOSPADM

## 2025-07-18 RX ORDER — CARVEDILOL 6.25 MG/1
6.25 TABLET ORAL 2 TIMES DAILY
Status: DISCONTINUED | OUTPATIENT
Start: 2025-07-18 | End: 2025-07-23 | Stop reason: HOSPADM

## 2025-07-18 RX ORDER — AMLODIPINE BESYLATE 5 MG/1
5 TABLET ORAL DAILY
Status: DISCONTINUED | OUTPATIENT
Start: 2025-07-18 | End: 2025-07-22

## 2025-07-18 RX ORDER — ERGOCALCIFEROL 1.25 MG/1
1.25 CAPSULE ORAL WEEKLY
Status: DISCONTINUED | OUTPATIENT
Start: 2025-07-18 | End: 2025-07-23 | Stop reason: HOSPADM

## 2025-07-18 RX ORDER — IPRATROPIUM BROMIDE AND ALBUTEROL SULFATE 2.5; .5 MG/3ML; MG/3ML
3 SOLUTION RESPIRATORY (INHALATION) EVERY 6 HOURS PRN
Status: DISCONTINUED | OUTPATIENT
Start: 2025-07-18 | End: 2025-07-23 | Stop reason: HOSPADM

## 2025-07-18 RX ORDER — PANTOPRAZOLE SODIUM 40 MG/1
40 TABLET, DELAYED RELEASE ORAL
Status: DISCONTINUED | OUTPATIENT
Start: 2025-07-18 | End: 2025-07-23 | Stop reason: HOSPADM

## 2025-07-18 RX ORDER — METHOCARBAMOL 500 MG/1
500 TABLET, FILM COATED ORAL EVERY 8 HOURS SCHEDULED
Status: DISCONTINUED | OUTPATIENT
Start: 2025-07-18 | End: 2025-07-20

## 2025-07-18 RX ORDER — SODIUM CHLORIDE FOR INHALATION 3 %
3 VIAL, NEBULIZER (ML) INHALATION
Status: DISCONTINUED | OUTPATIENT
Start: 2025-07-18 | End: 2025-07-18

## 2025-07-18 RX ORDER — DEXTROSE 50 % IN WATER (D50W) INTRAVENOUS SYRINGE
25
Status: DISCONTINUED | OUTPATIENT
Start: 2025-07-18 | End: 2025-07-23 | Stop reason: HOSPADM

## 2025-07-18 RX ORDER — CLONIDINE HYDROCHLORIDE 0.1 MG/1
0.3 TABLET ORAL EVERY 8 HOURS SCHEDULED
Status: DISCONTINUED | OUTPATIENT
Start: 2025-07-18 | End: 2025-07-19

## 2025-07-18 RX ADMIN — METHOCARBAMOL TABLETS 500 MG: 500 TABLET, COATED ORAL at 05:17

## 2025-07-18 RX ADMIN — ACETAMINOPHEN 650 MG: 325 TABLET ORAL at 03:39

## 2025-07-18 RX ADMIN — CLONIDINE HYDROCHLORIDE 0.3 MG: 0.1 TABLET ORAL at 22:35

## 2025-07-18 RX ADMIN — METHOCARBAMOL TABLETS 500 MG: 500 TABLET, COATED ORAL at 14:52

## 2025-07-18 RX ADMIN — LEVETIRACETAM 750 MG: 500 TABLET, FILM COATED ORAL at 20:58

## 2025-07-18 RX ADMIN — PIPERACILLIN SODIUM AND TAZOBACTAM SODIUM 2.25 G: 2; .25 INJECTION, SOLUTION INTRAVENOUS at 18:40

## 2025-07-18 RX ADMIN — PIPERACILLIN SODIUM AND TAZOBACTAM SODIUM 2.25 G: 2; .25 INJECTION, SOLUTION INTRAVENOUS at 09:56

## 2025-07-18 RX ADMIN — CARVEDILOL 6.25 MG: 6.25 TABLET, FILM COATED ORAL at 20:58

## 2025-07-18 RX ADMIN — PREGABALIN 75 MG: 75 CAPSULE ORAL at 08:54

## 2025-07-18 RX ADMIN — CLONIDINE HYDROCHLORIDE 0.3 MG: 0.1 TABLET ORAL at 14:52

## 2025-07-18 RX ADMIN — CARVEDILOL 6.25 MG: 6.25 TABLET, FILM COATED ORAL at 10:29

## 2025-07-18 RX ADMIN — CLONIDINE HYDROCHLORIDE 0.3 MG: 0.1 TABLET ORAL at 06:04

## 2025-07-18 RX ADMIN — PIPERACILLIN SODIUM AND TAZOBACTAM SODIUM 2.25 G: 2; .25 INJECTION, SOLUTION INTRAVENOUS at 03:39

## 2025-07-18 RX ADMIN — ASPIRIN 81 MG CHEWABLE TABLET 81 MG: 81 TABLET CHEWABLE at 08:54

## 2025-07-18 RX ADMIN — ATORVASTATIN CALCIUM 40 MG: 40 TABLET, FILM COATED ORAL at 20:58

## 2025-07-18 RX ADMIN — CALCITRIOL 0.5 MCG: 0.5 CAPSULE, LIQUID FILLED ORAL at 08:53

## 2025-07-18 RX ADMIN — METHOCARBAMOL TABLETS 500 MG: 500 TABLET, COATED ORAL at 22:35

## 2025-07-18 RX ADMIN — VANCOMYCIN HYDROCHLORIDE 500 MG: 500 INJECTION, SOLUTION INTRAVENOUS at 01:42

## 2025-07-18 SDOH — SOCIAL STABILITY: SOCIAL INSECURITY
WITHIN THE LAST YEAR, HAVE YOU BEEN KICKED, HIT, SLAPPED, OR OTHERWISE PHYSICALLY HURT BY YOUR PARTNER OR EX-PARTNER?: PATIENT DECLINED

## 2025-07-18 SDOH — SOCIAL STABILITY: SOCIAL INSECURITY: DOES ANYONE TRY TO KEEP YOU FROM HAVING/CONTACTING OTHER FRIENDS OR DOING THINGS OUTSIDE YOUR HOME?: NO

## 2025-07-18 SDOH — ECONOMIC STABILITY: TRANSPORTATION INSECURITY
IN THE PAST 12 MONTHS, HAS LACK OF TRANSPORTATION KEPT YOU FROM MEDICAL APPOINTMENTS OR FROM GETTING MEDICATIONS?: PATIENT DECLINED

## 2025-07-18 SDOH — SOCIAL STABILITY: SOCIAL INSECURITY: DO YOU FEEL UNSAFE GOING BACK TO THE PLACE WHERE YOU ARE LIVING?: NO

## 2025-07-18 SDOH — ECONOMIC STABILITY: INCOME INSECURITY
IN THE PAST 12 MONTHS HAS THE ELECTRIC, GAS, OIL, OR WATER COMPANY THREATENED TO SHUT OFF SERVICES IN YOUR HOME?: PATIENT DECLINED

## 2025-07-18 SDOH — SOCIAL STABILITY: SOCIAL INSECURITY: WERE YOU ABLE TO COMPLETE ALL THE BEHAVIORAL HEALTH SCREENINGS?: YES

## 2025-07-18 SDOH — SOCIAL STABILITY: SOCIAL INSECURITY: DO YOU FEEL ANYONE HAS EXPLOITED OR TAKEN ADVANTAGE OF YOU FINANCIALLY OR OF YOUR PERSONAL PROPERTY?: NO

## 2025-07-18 SDOH — SOCIAL STABILITY: SOCIAL INSECURITY
WITHIN THE LAST YEAR, HAVE YOU BEEN HUMILIATED OR EMOTIONALLY ABUSED IN OTHER WAYS BY YOUR PARTNER OR EX-PARTNER?: PATIENT DECLINED

## 2025-07-18 SDOH — ECONOMIC STABILITY: FOOD INSECURITY: WITHIN THE PAST 12 MONTHS, THE FOOD YOU BOUGHT JUST DIDN'T LAST AND YOU DIDN'T HAVE MONEY TO GET MORE.: PATIENT DECLINED

## 2025-07-18 SDOH — SOCIAL STABILITY: SOCIAL INSECURITY
WITHIN THE LAST YEAR, HAVE YOU BEEN RAPED OR FORCED TO HAVE ANY KIND OF SEXUAL ACTIVITY BY YOUR PARTNER OR EX-PARTNER?: PATIENT DECLINED

## 2025-07-18 SDOH — ECONOMIC STABILITY: FOOD INSECURITY
WITHIN THE PAST 12 MONTHS, YOU WORRIED THAT YOUR FOOD WOULD RUN OUT BEFORE YOU GOT THE MONEY TO BUY MORE.: PATIENT DECLINED

## 2025-07-18 SDOH — ECONOMIC STABILITY: HOUSING INSECURITY: IN THE LAST 12 MONTHS, WAS THERE A TIME WHEN YOU WERE NOT ABLE TO PAY THE MORTGAGE OR RENT ON TIME?: PATIENT DECLINED

## 2025-07-18 SDOH — SOCIAL STABILITY: SOCIAL INSECURITY: WITHIN THE LAST YEAR, HAVE YOU BEEN AFRAID OF YOUR PARTNER OR EX-PARTNER?: PATIENT DECLINED

## 2025-07-18 SDOH — ECONOMIC STABILITY: HOUSING INSECURITY: DO YOU FEEL UNSAFE GOING BACK TO THE PLACE WHERE YOU LIVE?: NO

## 2025-07-18 SDOH — SOCIAL STABILITY: SOCIAL INSECURITY: HAVE YOU HAD ANY THOUGHTS OF HARMING ANYONE ELSE?: NO

## 2025-07-18 SDOH — SOCIAL STABILITY: SOCIAL INSECURITY: HAS ANYONE EVER THREATENED TO HURT YOUR FAMILY OR YOUR PETS?: NO

## 2025-07-18 SDOH — SOCIAL STABILITY: SOCIAL INSECURITY: HAVE YOU HAD THOUGHTS OF HARMING ANYONE ELSE?: NO

## 2025-07-18 SDOH — SOCIAL STABILITY: SOCIAL INSECURITY: ARE YOU OR HAVE YOU BEEN THREATENED OR ABUSED PHYSICALLY, EMOTIONALLY, OR SEXUALLY BY ANYONE?: NO

## 2025-07-18 SDOH — SOCIAL STABILITY: SOCIAL INSECURITY: ARE THERE ANY APPARENT SIGNS OF INJURIES/BEHAVIORS THAT COULD BE RELATED TO ABUSE/NEGLECT?: NO

## 2025-07-18 SDOH — SOCIAL STABILITY: SOCIAL INSECURITY: ABUSE: ADULT

## 2025-07-18 ASSESSMENT — COGNITIVE AND FUNCTIONAL STATUS - GENERAL
MOVING TO AND FROM BED TO CHAIR: A LITTLE
WALKING IN HOSPITAL ROOM: A LITTLE
TURNING FROM BACK TO SIDE WHILE IN FLAT BAD: A LOT
MOBILITY SCORE: 20
PATIENT BASELINE BEDBOUND: NO
CLIMB 3 TO 5 STEPS WITH RAILING: A LOT
MOVING TO AND FROM BED TO CHAIR: A LITTLE
STANDING UP FROM CHAIR USING ARMS: A LOT
MOVING TO AND FROM BED TO CHAIR: A LOT
WALKING IN HOSPITAL ROOM: A LITTLE
DAILY ACTIVITIY SCORE: 24
CLIMB 3 TO 5 STEPS WITH RAILING: A LITTLE
WALKING IN HOSPITAL ROOM: A LOT
MOBILITY SCORE: 13
STANDING UP FROM CHAIR USING ARMS: A LITTLE
MOVING FROM LYING ON BACK TO SITTING ON SIDE OF FLAT BED WITH BEDRAILS: A LITTLE
DAILY ACTIVITIY SCORE: 24
CLIMB 3 TO 5 STEPS WITH RAILING: A LITTLE

## 2025-07-18 ASSESSMENT — LIFESTYLE VARIABLES
AUDIT-C TOTAL SCORE: 0
HOW OFTEN DO YOU HAVE A DRINK CONTAINING ALCOHOL: NEVER
HOW OFTEN DO YOU HAVE 6 OR MORE DRINKS ON ONE OCCASION: NEVER
HOW MANY STANDARD DRINKS CONTAINING ALCOHOL DO YOU HAVE ON A TYPICAL DAY: PATIENT DOES NOT DRINK
AUDIT-C TOTAL SCORE: 0
SKIP TO QUESTIONS 9-10: 1

## 2025-07-18 ASSESSMENT — ACTIVITIES OF DAILY LIVING (ADL)
HEARING - LEFT EAR: FUNCTIONAL
WALKS IN HOME: INDEPENDENT
DRESSING YOURSELF: INDEPENDENT
JUDGMENT_ADEQUATE_SAFELY_COMPLETE_DAILY_ACTIVITIES: YES
TOILETING: INDEPENDENT
GROOMING: INDEPENDENT
PATIENT'S MEMORY ADEQUATE TO SAFELY COMPLETE DAILY ACTIVITIES?: YES
LACK_OF_TRANSPORTATION: PATIENT DECLINED
BATHING: INDEPENDENT
ADEQUATE_TO_COMPLETE_ADL: YES
FEEDING YOURSELF: INDEPENDENT
LACK_OF_TRANSPORTATION: PATIENT DECLINED
HEARING - RIGHT EAR: FUNCTIONAL

## 2025-07-18 ASSESSMENT — ENCOUNTER SYMPTOMS
WOUND: 1
VOMITING: 0
CHILLS: 1
SHORTNESS OF BREATH: 0
COUGH: 0
NAUSEA: 1
FEVER: 1
ABDOMINAL PAIN: 1
DIARRHEA: 0

## 2025-07-18 ASSESSMENT — PAIN - FUNCTIONAL ASSESSMENT: PAIN_FUNCTIONAL_ASSESSMENT: 0-10

## 2025-07-18 ASSESSMENT — PAIN SCALES - GENERAL: PAINLEVEL_OUTOF10: 0 - NO PAIN

## 2025-07-18 NOTE — CONSULTS
Vancomycin Dosing by Pharmacy- INITIAL (HEMODIALYSIS)    Batsheva Page is a 49 y.o. year old female who Pharmacy has been consulted for vancomycin dosing for pneumonia. Based on the patient's indication and renal status this patient will be dosed based on a Pre-HD level of 20-25 mcg/mL.     Patient is currently on hemodialysis MWF     Visit Vitals  /77 (BP Location: Left arm, Patient Position: Sitting)   Pulse 84   Temp 36.9 °C (98.4 °F) (Oral)   Resp 20           Lab Results   Component Value Date    CREATININE 5.97 (H) 07/17/2025    CREATININE 5.97 (H) 07/17/2025    CREATININE 8.78 (H) 07/16/2025    CREATININE 7.54 (H) 07/15/2025       I/O last 3 completed shifts:  In: 1600 (23 mL/kg) [I.V.:1200 (17.2 mL/kg); Other:400]  Out: 4800 (69 mL/kg) [Other:4800]  Weight: 69.6 kg     Assessment/Plan     Initial Loading Dosing:will not be given a loading dose   Vancomycin maintenance dose: 750 mg after each dialysis session MWF  Pre-HD level will be obtained on 7/18/25 at 10:00. May be obtained sooner if clinically indicated.   Will continue to monitor renal function daily while on vancomycin and order serum creatinine at least every 48 hours if not already ordered.  Follow for continued vancomycin needs, clinical response, and signs/symptoms of toxicity.     Sarah Norton, [facility]

## 2025-07-18 NOTE — PROGRESS NOTES
Pharmacy Medication History Review    Batsheva Page is a 49 y.o. female admitted for Bacteremia. Pharmacy reviewed the patient's [id number] medications and allergies for accuracy.    Medications ADDED:  Benadryl   Oxycodone-Acetaminophen   Medications CHANGED:  N/A  Medications REMOVED/NOT TAKING:   Tylenol   Aspirin   Atorvastatin   Calcitriol   Retacrit D2  Mycamine   Promethazine   Vancomycin      The list below reflects the updated PTA list.   Prior to Admission Medications   Prescriptions Last Dose Informant   HYDROmorphone (Dilaudid) 2 mg tablet Not Taking Other   Sig: Take 1 tablet (2 mg) by mouth every 6 hours if needed for severe pain (7 - 10).   Patient not taking: Reported on 7/18/2025 Pt never picked up, med on hold.    acetaminophen (Tylenol) 325 mg tablet Not Taking Spouse/Significant Other   Sig: Take 2 tablets (650 mg) by mouth every 6 hours as needed for mild pain (1 - 3).   Patient not taking: Reported on 7/18/2025   amLODIPine (Norvasc) 5 mg tablet Not Taking Other   Sig: Take 1 tablet (5 mg) by mouth once daily.   Patient not taking: Reported on 7/18/2025   aspirin 81 mg EC tablet Not Taking Other   Sig: Take 1 tablet (81 mg) by mouth once daily.   Patient not taking: Reported on 7/18/2025   carvedilol (Coreg) 12.5 mg tablet  Other   Sig: Take 1 tablet (12.5 mg) by mouth 2 times a day.  Medication still active, pt has not picked up med since 4/27/25 30 day supply.      cloNIDine (Catapres) 0.3 mg tablet  Other, Spouse/Significant Other   Sig: Take 1 tablet (0.3 mg) by mouth every 8 hours.   diphenhydramine HCl (BENADRYL ORAL)  Other, Spouse/Significant Other   Sig: Take 1 tablet by mouth once daily as needed. Take on dialysis days   epoetin brandy-epbx (RETACRIT) 20,000 unit/mL solution Not Taking Other   Sig: Inject 6,440 Units under the skin 3 times a week. Do not start before January 17, 2024.   Patient not taking: Reported on 7/18/2025  Partner did not know about med, might be  "given at dialysis.    levETIRAcetam (Keppra) 500 mg tablet  Other, Spouse/Significant Other   Sig: Take 1.5 tablets (750 mg) by mouth once daily at bedtime.  No recent fill history, pts partner reported she still takes.    lidocaine 4 % patch  Other   Sig: Place 1 patch over 12 hours on the skin once daily. Remove & discard patch within 12 hours or as directed by MD. On hold    methocarbamol (Robaxin) 500 mg tablet  Other   Sig: Take 1 tablet (500 mg) by mouth every 8 hours.   micafungin (Mycamine) IV Not Taking Other   Sig: Infuse 100 mL (100 mg) into a venous catheter once daily for 9 days.   Patient not taking: Reported on 2025   oxyCODONE-acetaminophen (Percocet) 5-325 mg tablet  Other, Spouse/Significant Other   Sig: Take 1 tablet by mouth every 4 hours.   pregabalin (Lyrica) 75 mg capsule  Other   Sig: Take 1 capsule (75 mg) by mouth once daily.   Patient taking differently: Take 1 capsule (75 mg) by mouth 2 times a day.   sevelamer carbonate (Renvela) 800 mg tablet  Other   Sig: Take 1 tablet (800 mg) by mouth 3 times daily (morning, midday, late afternoon). Swallow tablet whole; do not crush, break, or chew.   Patient not taking: Reported on 2025  Pt picked up med on , but reported not taking on .    vancomycin 5 mg/mL in sodium chloride 0.9% solution Not Taking Other   Si mL by intra-catheter route 3 (three) times a week.   Patient not taking: Reported on 2025      Facility-Administered Medications: None        The list below reflects the updated allergy list. Please review each documented allergy for additional clarification and justification.  Allergies  Reviewed by Breanna Phoenix on 2025        Severity Reactions Comments    Compazine [prochlorperazine] High Other anxious, agitated, \"skin crawl    Kayexalate High Seizure     Reglan [metoclopramide Hcl] High Other anxious, agitated, \"skin crawl    Zofran [ondansetron Hcl] Not Specified Headache             Patient declines " "M2B at discharge.     Sources:   [facility]  Pharmacy dispense history  Patient Interview Unable to provide any details  Spouse Chava Saleem [phone] Moderate historian, Child Xiao Poor historian, Other [facility] [phone] Good historian  Chart Review  Care Everywhere     Additional Comments:  I went to interview pt, there was a lot going on in her shared room. Pt appeared to be a bit confused.   I called pts daughter, she did not know her moms meds, she did give me her mom partners phone number to reach out to.   I also called Pts partner for a med list, he was able to name a few but was not really sure about everything she takes.   I followed up with calling her pharmacy to confirm her meds and dosages.       THEODORA PEREIRA [place]  Pharmacy Technician  07/18/25     Secure Chat preferred   If no response call [phone] or [facility] \"Med Rec\"   "

## 2025-07-18 NOTE — CARE PLAN
Problem: Pain - Adult  Goal: Verbalizes/displays adequate comfort level or baseline comfort level  Outcome: Progressing     Problem: Safety - Adult  Goal: Free from fall injury  Outcome: Progressing     Problem: Chronic Conditions and Co-morbidities  Goal: Patient's chronic conditions and co-morbidity symptoms are monitored and maintained or improved  Outcome: Progressing     Problem: Skin  Goal: Decreased wound size/increased tissue granulation at next dressing change  Outcome: Progressing  Flowsheets (Taken 7/18/2025 0111)  Decreased wound size/increased tissue granulation at next dressing change: Promote sleep for wound healing  Goal: Participates in plan/prevention/treatment measures  Outcome: Progressing  Flowsheets (Taken 7/18/2025 0111)  Participates in plan/prevention/treatment measures: Elevate heels  Goal: Prevent/minimize sheer/friction injuries  Outcome: Progressing  Flowsheets (Taken 7/18/2025 0111)  Prevent/minimize sheer/friction injuries:   HOB 30 degrees or less   Turn/reposition every 2 hours/use positioning/transfer devices   The patient's goals for the shift include      The clinical goals for the shift include patient will remain hemodynamically stable, no signs and symptoms of respiratory depression/distress

## 2025-07-18 NOTE — PROGRESS NOTES
Vancomycin Dosing by Pharmacy- FOLLOW UP    Batsheva Page is a 49 y.o. year old female who Pharmacy has been consulted for vancomycin dosing for OSH suspected line infection, r/o endocarditis  . Based on the patient's indication and renal status this patient is being dosed based on a goal pre-HD level of 20-25.     Renal function: ESRD on iHD (MWF)     Current vancomycin dose: Received 500 mg IV on  AM, as patient did not receive post-HD dose on  at OSH.     Most recent random level: 28.9 mcg/mL    Visit Vitals  /71   Pulse 75   Temp 36.5 °C (97.7 °F)   Resp 16        Lab Results   Component Value Date    CREATININE 8.28 (H) 2025    CREATININE 5.97 (H) 2025    CREATININE 5.97 (H) 2025    CREATININE 8.78 (H) 2025        Patient weight is as follows:   Vitals:    25 0600   Weight: 64.7 kg (142 lb 10.2 oz)       Cultures:  No results found for the encounter in last 14 days.       No intake/output data recorded.  I/O during current shift:  No intake/output data recorded.    Temp (24hrs), Av.1 °C (100.5 °F), Min:36.2 °C (97.2 °F), Max:39.3 °C (102.7 °F)      Assessment/Plan    Above goal random/trough level. Will hold post-HD dose on , and follow up random level on  AM.     The next level will be obtained on  at AM labs. May be obtained sooner if clinically indicated.   Will continue to monitor renal function daily while on vancomycin and order serum creatinine at least every 48 hours if not already ordered.  Follow for continued vancomycin needs, clinical response, and signs/symptoms of toxicity.       Washington Clifton, PharmD

## 2025-07-18 NOTE — CONSULTS
Vancomycin Dosing by Pharmacy- INITIAL    Batsheva Page is a 49 y.o. year old female who Pharmacy has been consulted for vancomycin dosing for line infections. Based on the patient's indication and renal status this patient will be dosed based on a goal pre-HD level of 20-25.     CKD patient on MWF iHD. Last session . Next session anticipated .   Patient continuing treatment from OSH. Pt never received  post dialysis dose. Dosing patient with 500mg now overnight (013), then will continue with  preHD lab, dialysis session, and post HD dose. Went with 500mg instead of 750 for overnight dose due to wt < 70 and late dose <12 hours before next dialysis.    Visit Vitals  /75 (BP Location: Left arm, Patient Position: Lying)   Pulse 75   Temp 36.2 °C (97.2 °F) (Temporal)   Resp 18        Lab Results   Component Value Date    CREATININE 5.97 (H) 2025    CREATININE 5.97 (H) 2025    CREATININE 8.78 (H) 2025    CREATININE 7.54 (H) 07/15/2025        Patient weight is as follows: There were no vitals filed for this visit.    Cultures:  No results found for the encounter in last 14 days.        No intake/output data recorded.  I/O during current shift:  No intake/output data recorded.    Temp (24hrs), Av.3 °C (101 °F), Min:36.2 °C (97.2 °F), Max:39.3 °C (102.7 °F)         Assessment/Plan     Patient received one LD (1750mg)  and 1 MD (750mg) . Patient never received  post dialysis dose so giving dose now  0130.  Will initiate vancomycin maintenance dosing based on pre-Hd return level  Follow-up level will be ordered on  at 1000 unless clinically indicated sooner.  Scheduled mid AM lab 1000 opposed to 0500 due to timing of this last dose. Would like to avoid overlap with dialysis if possible.  Will continue to monitor renal function daily while on vancomycin and order serum creatinine at least every 48 hours if not already ordered.  Follow for continued  vancomycin needs, clinical response, and signs/symptoms of toxicity.       Hamlet Schaefer, PharmD

## 2025-07-18 NOTE — PROGRESS NOTES
This am pt was sedated requiring sternal rub, now improving, still not at cognitive baseline. Suspect polypharm in setting her esrd. Possible contribution from infection or seizures. Pt has been declining her keppra. Ordered eeg, hold opioids and Benadryl. Monitoring for opioid withdrawal, nursing aware. Had discussion with pt this am with nursing, later not recalling our conversation per report from, nursing reporting no signs of discomfort. Pt requested dilaudid for Hd, then requested iv benadryl, then refused transport to Hd when this request was declined based on above. Daughter came and came to bedside to discuss care plan, continue to work up polymicrobial infection. Repeat blood cultures. Continue abx. Will need eye exam given fungemia. Pt with e/o pna and loculated effusions in ct, needs throacentesis. Likey on Monday given weekend, will place ir consult this weekend. Discuss plans for holding sedation. Pt continued to refused Hd, daughter reports pt is currently having signs of confusion. Pt reports she needs the Benadryl for her itching and anxiety, discussed retrialing small oral doses when in more stable cognitive status, pt states only higher doses will work. We discussed monitoring for opioid withdrawal. Daughter also expressed she has been refusing to take her keppra. States she gets in periods where she will refuse care or get agitated and she will be available to talk to her if needed. Daughter also endorses significant concerns about her sedation at the osh. Plan to discuss need for ramon with id.

## 2025-07-18 NOTE — H&P
History Of Present Illness  Batsheva Page is a 49 y.o. female presenting with PMH notable for HTN, Hx FSGS, ESRD (MWF), Hyperparathyroidism, Coronary Valvuloplasty, DVT (not presently on OAC), Hx Recurrent bacteremia and HD graft Infection, Chronic Pain w/Hx of Opiate Dependence transferred from OSH at request of ID for management of fungemia, recurrent bacteremia, and possible source control w/challenges of possible line, recurrence of endocarditis and/or PNA infection considered by ID and HD needs.     Pt was notably conversant w/RN n arrival overnight to floor though asking for opiates and then fell asleep, and though vitals stable and objectively non distressed per RN was not able/or willing to sustain wakefulness enough or for further questions w/this writer at bedside.     [facility] recent IM, ID and nursing documentation reviewed.   Chart review of out pt opiate and pain hx/rationale reviewed   DC summary from similar episode DC 6/19 reviewed.   Complex course, multiple consults. No DC summary was available to review at time of admission         Past Medical History  She has a past medical history of Aortic valve replaced (2022), CHF (congestive heart failure), Chronic pain, Coronary artery disease, Disease of thyroid gland, ESRD (end stage renal disease) (Multi), H/O mitral valve replacement (2013), Heart disease, History of transfusion, Hypertension, Mitral valve regurgitation, Seizures (Multi), and Stroke (Multi).    Surgical History  She has a past surgical history that includes IR CVC tunneled (09/09/2022); IR CVC tunneled (12/28/2022); US guided percutaneous placement (07/14/2022); Parathyroidectomy; Coronary artery bypass graft; Aortic valve replacement (09/26/2022); Mitral valve replacement (09/26/2022); Mitral valve repair (2013); Tricuspid valvuloplasty (2013); IR CVC (01/12/2024); IR CVC (05/07/2024); and IR CVC (08/20/2024).     Social History  Per EMR pt denies current ETOH Or drug use.      Family History  Reviewed as available in EMR not acutely relevant      Allergies  Compazine [prochlorperazine], Kayexalate, Reglan [metoclopramide hcl], and Zofran [ondansetron hcl]    Review of Systems  A 10 point ROS is limited by pt alertness or cooperation      Physical Exam  GEN mid aged  F, overweight, resting supine and non distressed and relaxed expression on face  HEENT NCAT appearing w/typical distribution of hair, trachea midline  CV Murmur noted, RR, no JVD noted though position may limit exam   PULM CTAB wearing NC, no wheeze or cough   ABD soft no guarding   EXT no pretibial pitting edema noted, LE skin has multiple excoriations or small healing wounds, a faded rosary tattoo present distal RLE   NEURO no tremors, leg restlessness or jerks, no facial asymmetry noted   PSYCH not agitated, unable to assess capacity or judgement in this context      Last Recorded Vitals  /75 (BP Location: Left arm, Patient Position: Lying)   Pulse 75   Temp 36.2 °C (97.2 °F) (Temporal)   Resp 18   Wt 64.7 kg (142 lb 10.2 oz)   SpO2 92%     Relevant Results (please see EMR for results from [facility] course)   Narrative & Impression   Interpreted By:  Antonio Zarate,   STUDY:  CT CHEST ABDOMEN PELVIS WO CONTRAST;  7/17/2025 1:25 pm      INDICATION:  Signs/Symptoms:febrile s/p r fem hd catheter exchange.          COMPARISON:  Abdomen CT scan 06/20/2020      ACCESSION NUMBER(S):  [id number]      ORDERING CLINICIAN:  MIGUEL ALCARAZ      TECHNIQUE:  CT of the chest, abdomen and pelvis was performed. Contiguous axial  images were obtained at 3 mm slice thickness through the chest,  abdomen and pelvis. Coronal and sagittal reconstructions at 3 mm  slice thickness were performed.  No intravenous contrast was  administered; positive oral contrast was given.      FINDINGS:  Please note that the study is limited without intravenous contrast.      CHEST:          LUNG/PLEURA/LARGE AIRWAYS:  Loculated  right-greater-than-left pleural effusion with surrounding  atelectasis and scarring with the largest pocket along the right  major fissure measuring 6.7 x 4.3 cm. Scattered bilateral airspace  opacities. No pneumothorax. Central airways are patent.      VESSELS:  Mild thoracic aortic vascular calcifications. Mild coronary artery  calcifications.      HEART:  Cardiomegaly. No pericardial effusion. Aortic valve and mitral valve  calcifications. Epicardial pacing wire noted.      MEDIASTINUM AND HERIBERTO:  Few subcentimeter mediastinal lymph nodes likely reactive. The  esophagus appears normal.      CHEST WALL AND LOWER NECK:  Previous median sternotomy. No chest wall masses. What is visualized  of the thyroid gland is unremarkable right shoulder/axillary region  vascular grafts noted.      ABDOMEN:      LIVER:  The liver is normal in size without evidence of focal liver lesions.      BILE DUCTS:  No biliary dilatation      GALLBLADDER:  Distended without acute changes      PANCREAS:  Atrophic changes. No duct dilatation      SPLEEN:  No splenomegaly      ADRENAL GLANDS:  No nodule      KIDNEYS AND URETERS:  Atrophic native kidneys without hydronephrosis.      PELVIS:      BLADDER:  Decompressed      REPRODUCTIVE ORGANS:  Right adnexal cyst measuring 4.5 cm.      BOWEL:  No bowel dilatation. Large colonic stool burden. No bowel dilatation.  Trace pelvic ascites. No pneumoperitoneum.      VESSELS:  Multifocal vascular calcifications. Right femoral venous catheter  that extends all the way to the junction of the right atrium with the  inferior vena cava.      PERITONEUM/RETROPERITONEUM/LYMPH NODES:  No enlarged lymph nodes.      ABDOMINAL WALL:  Subcutaneous edema.      BONES:  Multilevel degenerative spine changes and facet joint disease.      IMPRESSION:  Chest  1. Bilateral loculated pleural and fissural pleural effusion of  indeterminate sterility, largest pocket along the right major fissure  measuring 6.7 x 4.3 cm with  surrounding compressive atelectasis.  2. Scattered bilateral upper lobe predominant airspace opacities  could be infectious.  3. Multiple mildly enlarged mediastinal lymph nodes possibly reactive.      Abdomen-Pelvis  1. No acute abnormality in the abdomen or pelvis within the  limitation of this unenhanced study.  2. Persistent right adnexal cystic lesion measuring 4.5 cm which  could be further assessed by dedicated nonemergent outpatient pelvic  ultrasound     Narrative & Impression   Interpreted By:  Jeny Edmond,   STUDY:  CT HEAD WO IV CONTRAST;  12/14/2024 4:11 am      INDICATION:  Signs/Symptoms:Double vision, dizziness.      COMPARISON:  12/31/2022      ACCESSION NUMBER(S):  [id number]      ORDERING CLINICIAN:  HARITHA RAMON      TECHNIQUE:  Axial noncontrast CT images of the head.      FINDINGS:  BRAIN PARENCHYMA:  Gray-white matter interfaces are preserved. No  mass effect or midline shift.      HEMORRHAGE: No acute intracranial hemorrhage.  VENTRICLES and EXTRA-AXIAL SPACES: The ventricles and sulci are  within normal limits in size for brain volume. No abnormal extraaxial  fluid collection. EXTRACRANIAL SOFT TISSUES: Within normal limits.  PARANASAL SINUSES/MASTOIDS:  The visualized paranasal sinuses and  mastoid air cells are aerated. CALVARIUM: No depressed skull  fracture. No destructive osseous lesion.      OTHER FINDINGS: None.      IMPRESSION:  No acute intracranial abnormality.          MACRO:  None      Signed by: Jeny Edmond 12/14/2024 4:38 AM  Dictation workstation:   [id number]     Excerpt Trans thoracic Echo this [place] [address]             Phone [phone]     TRANSTHORACIC ECHOCARDIOGRAM REPORT     Patient Name:       RITA TEMPLE Reading Physician:    [id number] Willi Anderson MD  Study Date:         7/14/2025            Ordering Provider:    [id number]  XANDER CEVALLOS  MRN/PID:            [id number]             Fellow:  Accession#:         [id number]         Nurse:  Date of Birth/Age:  1975 / 49      Sonographer:          Darcie hirsch RDCS  Gender Assigned at  F                    Additional Staff:  Birth:  Height:             195.58 cm            Admit Date:  Weight:             79.38 kg             Admission Status:     Inpatient -                                                                 Routine  BSA / BMI:          2.11 m2 / 20.75      Department Location:  [facility]                      kg/m2  Blood Pressure: 146 /74 mmHg     Study Type:    TRANSTHORACIC ECHO (TTE) COMPLETE  Diagnosis/ICD: Acute and subacute infective endocarditis-I33.0  Indication:    Chronic bacterial endocarditis  CPT Codes:     Echo Complete w Full Doppler-[id number]     Patient History:  Valve Disorders:   Aortic Valve Replacement, Mitral Valve Replacement and                     Tricuspid Valve Repair.  Pertinent History: CAD and HTN.     Study Detail: The following Echo studies were performed: 2D, M-Mode, Doppler and                color flow.        PHYSICIAN INTERPRETATION:  Left Ventricle: Left ventricular ejection fraction is normal by visual estimate at 60%. There are no regional wall motion abnormalities. The left ventricular cavity size is normal. There is mild increased septal and mildly increased posterior left ventricular wall thickness. There is left ventricular concentric remodeling. Spectral Doppler shows a Grade I (impaired relaxation pattern) of left ventricular diastolic filling with normal left atrial filling pressure.  Left Atrium: The left atrial size is moderately dilated.  Right Ventricle: The right ventricle is mildly enlarged. There is normal right ventricular global systolic function.  Right Atrium: The right atrial  size is mild to moderately dilated.  Aortic Valve: There is a prosthetic aortic valve present. The aortic valve area by VTI is 1.63 cmï¿½ with a peak velocity of 3.07 m/s. The peak and mean gradients are 38 mmHg and 18 mmHg, respectively, with a dimensionless index of 0.52. Echo findings are consistent with normal aortic valve prosthesis structure and function. There is no evidence of aortic valve regurgitation.  Mitral Valve: There is a prosthetic mitral valve present. The doppler estimated peak and mean diastolic gradients are 19 mmHg and 6 mmHg, respectively. There is a [employer] bioprosthetic type mitral valve prosthesis with a 27 mm reported size. The peak and mean gradients are 18.5 mmHg and 6 mmHg respectively. Echo findings are consistent with normal mitral valve prosthesis structure and function. There is no evidence of mitral valve regurgitation. The E Vmax is 1.83 m/s.  Tricuspid Valve: Status post tricuspid valve repair. The doppler estimated peak and mean diastolic gradients are 15.2 mmHg and 6.0 mmHg, respectively. No evidence of tricuspid regurgitation. The Doppler estimated right ventricular systolic pressure (RVSP) is moderately elevated at 63 mmHg.  Pulmonic Valve: The pulmonic valve is not well visualized. There is trace pulmonic valve regurgitation.  Pericardium: No pericardial effusion noted.  Aorta: The aortic root was not well visualized.  Systemic Veins: The inferior vena cava appears normal in size, IVC inspiratory collapse is not well visualized.  In comparison to the previous echocardiogram(s): Compared with study dated 12/6/2024,.        CONCLUSIONS:   1. Left ventricular ejection fraction is normal by visual estimate at 60%.   2. Spectral Doppler shows a Grade I (impaired relaxation pattern) of left ventricular diastolic filling with normal left atrial filling pressure.   3. There is normal right ventricular global systolic function.   4. Mildly enlarged right ventricle.   5. The left  atrial size is moderately dilated.   6. The right atrial size is mild to moderately dilated.   7. There is a [employer] bioprosthetic type mitral valve prosthesis with a 27 mm reported size. The peak and mean gradients are 18.5 mmHg and 6 mmHg respectively.   8. Status post tricuspid valve repair.   9. The Doppler estimated RVSP is moderately elevated at 63 mmHg.  10. Echo findings are consistent with normal mitral valve prosthesis structure and function.  11. Normal aortic valve prosthesis structure and function.        Assessment/Plan   Batsheva Page is a 49 y.o. female presenting with PMH notable for HTN, Hx FSGS, ESRD (MWF), Hyperparathyroidism, Coronary Valvuloplasty, DVT (not presently on OAC), Hx Recurrent bacteremia and HD graft Infection, Chronic Pain w/Hx of Opiate Dependence transferred from OSH at request of ID for management of fungemia, recurrent bacteremia, and possible source control w/challenges of possible line, recurrence of endocarditis and/or PNA infection considered by ID and HD needs.   Assessment & Plan  Bacteremia  W/Fungemia   Per OSH ID (see note 7/17/25), recurrent + Cx, ID considered may also involve contamination of some vials, re source initially though to have been line related, endocarditis, or assoc w/CT findings of loculations/PNA (above). Skin notably has multiple suferficial wounds/excoriations of LE   Bcx 7/10 staph epidermidis, candida glabrata  Bcx 7/12 no growth   Bcx prelim 7/17 Gram neg proteus mirabilis  Note that DC summary 6/19 incl plan for vancomycin dwell in HD line after ea HD session and may be warranted discuss w ID   Cont on Micofungin (14 days total through 7/24/25)   Cont Vancomycin + Zosyn   Cont source control (MRSA lab, LAURA and pulm consult appear to be next steps in workup from OSH, consider possibility of more occult source such as OM given ESR, CRP, unknown if ivdu hx etc   ID consult   History of endocarditis  May benefit from LAURA (last 2024),  appears only TTE performed thus far this course w/o obvious vegetations noted and w/expected prosthesic valve structure and function   Murmur, cardiac  History of valvuloplasty, see echo above   Not on any OAC, used to take eliquis 2.5 BID for hx DVT by EMR (?)  ESRD (end stage renal disease) (Multi)  On HD, issues w/access per oSH given Fem line was removed/exchanged around 7/10 -7/14 (latest placement), IR note 7/16 indicates dressing changed at R fem HD cath site due to excessive bleeding   On hyperparathyroid meds  Used to take sevelemar, not ordered/taking at OSH  MWF HD   Nephro LW was suggesting PD given poor venous access and frequent line infections or bacteremia/fungemia, would also r/o or ascertain if hx illit drug tampering is occurring which may introduce staph/candida as well   Consult nephro HD   Hyperparathyroidism (Multi)  Cont home meds as ordered at LW   Chronic pain  Recent RN charting at OSH suggests pain out of proportion w exam often asleep or desat and confused or lethargic as asking for more (at LW was also on PCA etc)  Opiate needs exceeding expected given RX hx   Cont home lyrica  Avoid potentiating agents such as excessive benadryl w/concurrent use opiates  Judicious multimodal regimen here   Has been on percocets, various other opiates and lyrica w/out pt provider for restless leg and pain per EMR (see records), from what appears to be pcp and neuro, unknown if hx documented IVDU   added narcan and hold opiates for acute resp distress, lethargy or change in vitals and notify attending       CODE DNR DNI per records/OSH admit   DIET renal   DVT prophy SCDs as at OSH     Jessica Jim, DO

## 2025-07-18 NOTE — PROGRESS NOTES
Vancomycin Dosing by Pharmacy- Cessation of Therapy    Consult to pharmacy for vancomycin dosing has been discontinued by the prescriber, pharmacy will sign off at this time.    Please call pharmacy if there are further questions or re-enter a consult if vancomycin is resumed.     Desean Roberts, CezarD

## 2025-07-18 NOTE — ASSESSMENT & PLAN NOTE
W/Fungemia   Per OSH ID (see note 7/17/25), recurrent + Cx, ID considered may also involve contamination of some vials, re source initially though to have been line related, endocarditis, or assoc w/CT findings of loculations/PNA (above). Skin notably has multiple suferficial wounds/excoriations of LE   Bcx 7/10 staph epidermidis, candida glabrata  Bcx 7/12 no growth   Bcx prelim 7/17 Gram neg proteus mirabilis  Note that DC summary 6/19 incl plan for vancomycin dwell in HD line after ea HD session and may be warranted discuss w ID   Cont on Micofungin (14 days total through 7/24/25)   Cont Vancomycin + Zosyn   Cont source control (MRSA lab, LAURA and pulm consult appear to be next steps in workup from OSH, consider possibility of more occult source such as OM given ESR, CRP, unknown if ivdu hx etc   ID consult

## 2025-07-18 NOTE — CARE PLAN
The patient's goals for the shift include will be safe and free from falls or injury during the shift    The clinical goals for the shift include Pt will be alert and participate fully in conversation during the shift    Problem: Pain - Adult  Goal: Verbalizes/displays adequate comfort level or baseline comfort level  Outcome: Progressing     Problem: Safety - Adult  Goal: Free from fall injury  Outcome: Progressing     Problem: Chronic Conditions and Co-morbidities  Goal: Patient's chronic conditions and co-morbidity symptoms are monitored and maintained or improved  Outcome: Progressing     Problem: Nutrition  Goal: Nutrient intake appropriate for maintaining nutritional needs  Outcome: Progressing   Patient non compliant with medication and physician orders, pt is requesting for dilaudid and refused to go for dialysis session.

## 2025-07-18 NOTE — NURSING NOTE
Report from Sending RN:    Report From: Jigar RUTH  Recent Surgery of Procedure: No  Baseline Level of Consciousness (LOC): Oriented x4 but lethargic  Oxygen Use: Yes, 2L  Type: NC  Diabetic: Yes  Last BP Med Given Day of Dialysis: Coreg 1029   Last Pain Med Given: N/A  Lab Tests to be Obtained with Dialysis: No  Blood Transfusion to be Given During Dialysis: No  Available IV Access: Yes  Medications to be Administered During Dialysis: No  Continuous IV Infusion Running: No  Restraints on Currently or in the Last 24 Hours: No  Hand-Off Communication: Pt is stable but lethargic.  She is resting but easily roused by verbal stimuli.  Recent VS obtained, /73, HR 89.  Dialysis Catheter Dressing: Yes  Last Dressing Change:

## 2025-07-18 NOTE — ASSESSMENT & PLAN NOTE
Recent RN charting at OSH suggests pain out of proportion w exam often asleep or desat and confused or lethargic as asking for more (at LW was also on PCA etc)  Opiate needs exceeding expected given RX hx   Cont home lyrica  Avoid potentiating agents such as excessive benadryl w/concurrent use opiates  Judicious multimodal regimen here   Has been on percocets, various other opiates and lyrica w/out pt provider for restless leg and pain per EMR (see records), from what appears to be pcp and neuro, unknown if hx documented IVDU   added narcan and hold opiates for acute resp distress, lethargy or change in vitals and notify attending

## 2025-07-18 NOTE — CARE PLAN
The patient's goals for the shift include will be safe and free from falls or injury during the shift    The clinical goals for the shift include Pt will be alert and participate fully in conversation during the shift

## 2025-07-18 NOTE — PROGRESS NOTES
Social Work Assessment      07/12/25 1708   Discharge Planning   Living Arrangements Spouse/significant other   Support Systems Spouse/significant other;Children;Family members   Assistance Needed none   Type of Residence Private residence   Number of Stairs to Enter Residence 4   Number of Stairs Within Residence 0   Do you have animals or pets at home? No   Who is requesting discharge planning? Provider   Home or Post Acute Services None   Expected Discharge Disposition Home   Does the patient need discharge transport arranged? No   Patient Choice   Provider Choice list and CMS website (https://medicare.gov/care-compare#search) for post-acute Quality and Resource Measure Data were provided and reviewed with: Other (Comment)  (no skilled needs)   Patient / Family choosing to utilize agency / facility established prior to hospitalization No   Stroke Family Assessment   Stroke Family Assessment Needed No      Patient was a transfer from [facility] to [facility].  Per previous assessment, patient lives at home with her significant other.  At that time, patient had no skilled needs.

## 2025-07-18 NOTE — ASSESSMENT & PLAN NOTE
History of valvuloplasty, see echo above   Not on any OAC, used to take eliquis 2.5 BID for hx DVT by EMR (?)

## 2025-07-18 NOTE — NURSING NOTE
Patient is lethargic and difficult to arouse. Patient did not respond to verbal or tactile stimulation. Patient responded to sternal rub and was drowsy. However, pt oriented X4 and was able to answer all questions then drift back to sleep in the middle of conversation. MD notified

## 2025-07-18 NOTE — NURSING NOTE
"Patient remain drowsy and is requesting dilaudid and Benadryl. RN administered scheduled clonidine 0.3 mg and Robaxin 500 mg tablets. Patient spits out 2 out of the clonidine and Robaxin tablets. Patient constantly asking for dilaudid and said \"I'm not going for dialysis.\" Patient then requested for Benadryl , RN informed pt that all sedating meds was on hold and MD had come to the bedside to explain to her . Pt replied \" I don't remember.\" MD Zavala was notified. Transporter was on the floor to take pt and she said   \"I'm not going anywhere until I have my Benadryl.\" Pt lying on the bed, no sign of distress noted, bed in lowest position and call light within reach.   "

## 2025-07-18 NOTE — NURSING NOTE
7/17/25 11pm (direct admit from OSH)  -patient awake but is very sleepy  -afebrile on initial vitals  -on o2 2lpm NC  -denies chest pain, chest discomfort, dizziness or lightheadedness  -no complains of difficulty breathing or shortness of breath  -patient has scattered scars on upper and lower extremities - per patient all of it was from previous AV fistulas  -right Forearm IV access and right groin HD access  -per patient she does not urinate anymore  -with wounds on upper and lower extremities  -belongings: cell phone, , eye glasses, clothes    7/18/25 0319  -chills, febrile -> 38.1  -providers notified and acknowledged  -PRN meds given    7/18/25 0456  -patient more awake  -stile afebrile ->39.2  -critical lab -> (+) gram negative bacilli on blood culture per lab  -notified provider - acknowledged    7/18/25 0700 (bedside report and initial rounds with day shift RN)  -patient awake and alert  -no complains of chest pain or difficulty breathing  -patient is calm and comfortable in bed  -patient verbalized that the pain medications that was changed to oral form does not work for her pain

## 2025-07-18 NOTE — DISCHARGE SUMMARY
Discharge Diagnosis  Fever  Recurrent Bacteremia  ESRD on HD         Issues Requiring Follow-Up  Transfer to [facility] for higher level of care    Discharge Meds     Medication List      ASK your doctor about these medications     acetaminophen 325 mg tablet; Commonly known as: Tylenol; Take 2 tablets   (650 mg) by mouth every 6 hours as needed for mild pain (1 - 3).   amLODIPine 5 mg tablet; Commonly known as: Norvasc; Take 1 tablet (5 mg)   by mouth once daily.   aspirin 81 mg EC tablet; Take 1 tablet (81 mg) by mouth once daily.   atorvastatin 40 mg tablet; Commonly known as: Lipitor   calcitriol 0.25 mcg capsule; Commonly known as: Rocaltrol   carvedilol 12.5 mg tablet; Commonly known as: Coreg; Take 1 tablet (12.5   mg) by mouth 2 times a day.   cloNIDine 0.3 mg tablet; Commonly known as: Catapres; Take 1 tablet (0.3   mg) by mouth every 8 hours.   epoetin brandy-epbx 20,000 unit/mL solution; Commonly known as: Retacrit;   Inject 6,440 Units under the skin 3 times a week. Do not start before   January 17, 2024.   ergocalciferol 1250 mcg (50,000 units) capsule; Commonly known as:   Vitamin D-2   HYDROmorphone 2 mg tablet; Commonly known as: Dilaudid; Take 1 tablet (2   mg) by mouth every 6 hours if needed for severe pain (7 - 10).   levETIRAcetam 500 mg tablet; Commonly known as: Keppra   lidocaine 4 % patch; Place 1 patch over 12 hours on the skin once daily.   Remove & discard patch within 12 hours or as directed by MD.   methocarbamol 500 mg tablet; Commonly known as: Robaxin; Take 1 tablet   (500 mg) by mouth every 8 hours.   micafungin IV; Commonly known as: Mycamine; Infuse 100 mL (100 mg) into   a venous catheter once daily for 9 days.   pregabalin 75 mg capsule; Commonly known as: Lyrica; Take 1 capsule (75   mg) by mouth once daily.   promethazine 25 mg tablet; Commonly known as: Phenergan   sevelamer carbonate 800 mg tablet; Commonly known as: Renvela; Take 1   tablet (800 mg) by mouth 3 times daily  (morning, midday, late afternoon).   Swallow tablet whole; do not crush, break, or chew.   vancomycin 5 mg/mL in sodium chloride 0.9% solution; 2 mL by   intra-catheter route 3 (three) times a week.       Test Results Pending At Discharge  Pending Labs       No current pending labs.            Hospital Course  49-year-old female, history of ESRD on HD, hx of recurrent bacteremia related to HD access sites, admitted to [facility] 7/11/2025 for recurrent bacteremia related to malfunctioning HD catheter.  Blood cultures on admit 1/2 bottles with gram-positive cocci with 2/2 bottles demonstrating yeast.  Started on IV micafungin per ID.  Received evaluation from nephrology, ID and palliative medicine.  S/p exchange of HD cath by IR on 7/14/2025: right femoral, under wire, tunneled.    On the evening of 7/16/2025, patient became abruptly febrile.  Repeat blood cultures obtained at that time per ID recommendations.  Patient remained febrile through 7/17/2025.  On 7/17/25 patient was assessed by nephrology. Due to her aforementioned complex medical history, nephrology advised transfer to higher level of care, specifically [facility], for concern for recurrent bacteremia in a chronically--and now acutely-- ill patient with dwindling options for vascular access for continued HD.     Patient was accepted by [facility] internal medicine service on the afternoon of 7/17/2025.  Patient was subsequently transferred to [facility] on the evening of 7/17/2025 for higher level of care.    Pertinent Physical Exam At Time of Discharge  Physical Exam  Vitals and nursing note reviewed.   Constitutional:       Appearance: Normal appearance. She is ill-appearing.   HENT:      Head: Normocephalic and atraumatic.      Right Ear: External ear normal.      Left Ear: External ear normal.      Nose: Nose normal.      Mouth/Throat:      Mouth: Mucous membranes are moist.     Eyes:      Extraocular Movements: Extraocular movements intact.      Conjunctiva/sclera:  Conjunctivae normal.       Cardiovascular:      Rate and Rhythm: Normal rate.      Pulses: Normal pulses.   Pulmonary:      Effort: Pulmonary effort is normal.      Comments: Diminished globally but no acute respiratory distress  5L 02 NC  Abdominal:      Palpations: Abdomen is soft.     Musculoskeletal:         General: Normal range of motion.      Cervical back: Normal range of motion and neck supple.     Skin:     General: Skin is warm and dry.      Capillary Refill: Capillary refill takes less than 2 seconds.      Comments: R fem HD cath dressing CDI  Abrasions BL-LE      Neurological:      Mental Status: She is alert and oriented to person, place, and time.     Psychiatric:         Mood and Affect: Mood normal.         Behavior: Behavior normal.         Outpatient Follow-Up  Future Appointments   Date Time Provider Department Center   7/18/2025  2:30 PM [facility]       Time spent on discharge 35 minutes    ORI Blake-CNP

## 2025-07-18 NOTE — ASSESSMENT & PLAN NOTE
On HD, issues w/access per oSH given Fem line was removed/exchanged around 7/10 -7/14 (latest placement), IR note 7/16 indicates dressing changed at R fem HD cath site due to excessive bleeding   On hyperparathyroid meds  Used to take sevelemar, not ordered/taking at OSH  MWF HD   Nephro LW was suggesting PD given poor venous access and frequent line infections or bacteremia/fungemia, would also r/o or ascertain if hx illit drug tampering is occurring which may introduce staph/candida as well   Consult nephro HD

## 2025-07-18 NOTE — SIGNIFICANT EVENT
Rapid Response Nurse Note: RADAR alert: 7    Pager time: [phone]  Arrival time: 456  Event end time: 506  Location: [id number]  [x] Triage by phone or secure messaging    Rapid response initiated by:  [] Rapid response RN [] Family [] Nursing Supervisor [] Physician   [x] RADAR auto page [] Sepsis auto-page [] RN [] RT   [] NP/PA [] Other:     Primary reason for call:   [] BAT [] New CPAP/BiPAP [] Bleeding [] Change in mental status   [] Chest pain [] Code blue [] FiO2 >/= 50% [] HR </= 40 bpm   [] HR >/= 130 bpm [] Hyperglycemia [] Hypoglycemia [x] RADAR    [] RR </= 8 bpm [] RR >/= 30 bpm [] SBP </= 90 mmHg [] SpO2 < 90%   [] Seizure [] Sepsis [] Shortness of breath  [] Staff concern: see comments     Initial VS and/or RADAR VS: T 39.2 °C; HR 92; RR 19; /76; SPO2 92%.    Interventions:  [x] None [] ABG/VBG [] Assist w/ICU transfer [] BAT paged    [] Bag mask [] Blood [] Cardioversion [] Code Blue   [] Code blue for intubation [] Code status changed [] Chest x-ray [] EKG   [] IV fluid/bolus [] KUB x-ray [] Labs/cultures [] Medication   [] Nebulizer treatment [] NIPPV (CPAP/BiPAP) [] Oxygen [] Oral airway   [] Peripheral IV [] Palliative care consult [] CT/MRI [] Sepsis protocol    [] Suctioned [] Other:     Outcome:  [] Coded and  [] Code blue for intubation [] Coded and transferred to ICU []  on division   [x] Remained on division (no change) [] Remained on division + additional monitoring [] Remained in ED [] Transferred to ED   [] Transferred to ICU [] Transferred to inpatient status [] Transferred for interventions (procedure) [] Transferred to ICU stepdown    [] Transferred to surgery [] Transferred to telemetry [] Sepsis protocol [] STEMI protocol   [] Stroke protocol [x] Bedside nurse instructed to page rapid response for any concerns or acute change in condition/VS     Additional Comments: Reviewed above RADAR VS with bedside RN via phone.  Vital signs within patient's current trends. Patient  has been febrile and just recently received tylenol, antibiotic coverage zosyn/vanco.  No acute change in condition.  No interventions by rapid response team indicated at this time.  Staff to page rapid response for any concerns or acute change in condition or VS.

## 2025-07-18 NOTE — ASSESSMENT & PLAN NOTE
May benefit from LAURA (last 2024), appears only TTE performed thus far this course w/o obvious vegetations noted and w/expected prosthesic valve structure and function

## 2025-07-19 ENCOUNTER — APPOINTMENT (OUTPATIENT)
Dept: NEUROLOGY | Facility: HOSPITAL | Age: 50
DRG: 871 | End: 2025-07-19
Payer: MEDICARE

## 2025-07-19 PROBLEM — R60.0 EDEMA OF RIGHT UPPER ARM: Status: ACTIVE | Noted: 2025-07-19

## 2025-07-19 LAB
ALBUMIN SERPL BCP-MCNC: 2.9 G/DL (ref 3.4–5)
ANION GAP SERPL CALC-SCNC: 24 MMOL/L (ref 10–20)
BACTERIA BLD AEROBE CULT: ABNORMAL
BACTERIA BLD CULT: ABNORMAL
BUN SERPL-MCNC: 47 MG/DL (ref 6–23)
CALCIUM SERPL-MCNC: 7 MG/DL (ref 8.6–10.6)
CHLORIDE SERPL-SCNC: 94 MMOL/L (ref 98–107)
CO2 SERPL-SCNC: 21 MMOL/L (ref 21–32)
CREAT SERPL-MCNC: 9.17 MG/DL (ref 0.5–1.05)
EGFRCR SERPLBLD CKD-EPI 2021: 5 ML/MIN/1.73M*2
ERYTHROCYTE [DISTWIDTH] IN BLOOD BY AUTOMATED COUNT: 16.8 % (ref 11.5–14.5)
GLUCOSE BLD MANUAL STRIP-MCNC: 106 MG/DL (ref 74–99)
GLUCOSE BLD MANUAL STRIP-MCNC: 116 MG/DL (ref 74–99)
GLUCOSE BLD MANUAL STRIP-MCNC: 146 MG/DL (ref 74–99)
GLUCOSE BLD MANUAL STRIP-MCNC: 97 MG/DL (ref 74–99)
GLUCOSE SERPL-MCNC: 96 MG/DL (ref 74–99)
GRAM STN SPEC: ABNORMAL
HCT VFR BLD AUTO: 26.6 % (ref 36–46)
HGB BLD-MCNC: 8.1 G/DL (ref 12–16)
MCH RBC QN AUTO: 29.9 PG (ref 26–34)
MCHC RBC AUTO-ENTMCNC: 30.5 G/DL (ref 32–36)
MCV RBC AUTO: 98 FL (ref 80–100)
NRBC BLD-RTO: 0 /100 WBCS (ref 0–0)
PHOSPHATE SERPL-MCNC: 4.9 MG/DL (ref 2.5–4.9)
PLATELET # BLD AUTO: 132 X10*3/UL (ref 150–450)
POTASSIUM SERPL-SCNC: 4.5 MMOL/L (ref 3.5–5.3)
RBC # BLD AUTO: 2.71 X10*6/UL (ref 4–5.2)
SODIUM SERPL-SCNC: 134 MMOL/L (ref 136–145)
STAPHYLOCOCCUS SPEC CULT: NORMAL
VANCOMYCIN SERPL-MCNC: 24 UG/ML (ref 5–20)
WBC # BLD AUTO: 7.9 X10*3/UL (ref 4.4–11.3)

## 2025-07-19 PROCEDURE — 36415 COLL VENOUS BLD VENIPUNCTURE: CPT | Performed by: STUDENT IN AN ORGANIZED HEALTH CARE EDUCATION/TRAINING PROGRAM

## 2025-07-19 PROCEDURE — 99233 SBSQ HOSP IP/OBS HIGH 50: CPT | Performed by: STUDENT IN AN ORGANIZED HEALTH CARE EDUCATION/TRAINING PROGRAM

## 2025-07-19 PROCEDURE — 2500000004 HC RX 250 GENERAL PHARMACY W/ HCPCS (ALT 636 FOR OP/ED): Performed by: STUDENT IN AN ORGANIZED HEALTH CARE EDUCATION/TRAINING PROGRAM

## 2025-07-19 PROCEDURE — 2500000001 HC RX 250 WO HCPCS SELF ADMINISTERED DRUGS (ALT 637 FOR MEDICARE OP): Performed by: STUDENT IN AN ORGANIZED HEALTH CARE EDUCATION/TRAINING PROGRAM

## 2025-07-19 PROCEDURE — 80069 RENAL FUNCTION PANEL: CPT | Performed by: STUDENT IN AN ORGANIZED HEALTH CARE EDUCATION/TRAINING PROGRAM

## 2025-07-19 PROCEDURE — 80202 ASSAY OF VANCOMYCIN: CPT | Performed by: STUDENT IN AN ORGANIZED HEALTH CARE EDUCATION/TRAINING PROGRAM

## 2025-07-19 PROCEDURE — 1210000001 HC SEMI-PRIVATE ROOM DAILY

## 2025-07-19 PROCEDURE — 82947 ASSAY GLUCOSE BLOOD QUANT: CPT

## 2025-07-19 PROCEDURE — 95720 EEG PHY/QHP EA INCR W/VEEG: CPT | Performed by: STUDENT IN AN ORGANIZED HEALTH CARE EDUCATION/TRAINING PROGRAM

## 2025-07-19 PROCEDURE — 85027 COMPLETE CBC AUTOMATED: CPT | Performed by: STUDENT IN AN ORGANIZED HEALTH CARE EDUCATION/TRAINING PROGRAM

## 2025-07-19 RX ORDER — CLONIDINE HYDROCHLORIDE 0.1 MG/1
0.1 TABLET ORAL EVERY 8 HOURS SCHEDULED
Status: DISCONTINUED | OUTPATIENT
Start: 2025-07-19 | End: 2025-07-23 | Stop reason: HOSPADM

## 2025-07-19 RX ADMIN — METHOCARBAMOL TABLETS 500 MG: 500 TABLET, COATED ORAL at 14:39

## 2025-07-19 RX ADMIN — PANTOPRAZOLE SODIUM 40 MG: 40 TABLET, DELAYED RELEASE ORAL at 06:05

## 2025-07-19 RX ADMIN — PIPERACILLIN SODIUM AND TAZOBACTAM SODIUM 2.25 G: 2; .25 INJECTION, SOLUTION INTRAVENOUS at 02:23

## 2025-07-19 RX ADMIN — CLONIDINE HYDROCHLORIDE 0.3 MG: 0.1 TABLET ORAL at 06:05

## 2025-07-19 RX ADMIN — PIPERACILLIN SODIUM AND TAZOBACTAM SODIUM 2.25 G: 2; .25 INJECTION, SOLUTION INTRAVENOUS at 09:41

## 2025-07-19 RX ADMIN — MICAFUNGIN SODIUM 100 MG: 100 INJECTION, POWDER, LYOPHILIZED, FOR SOLUTION INTRAVENOUS at 00:21

## 2025-07-19 RX ADMIN — CALCITRIOL 0.5 MCG: 0.5 CAPSULE, LIQUID FILLED ORAL at 08:31

## 2025-07-19 RX ADMIN — LEVETIRACETAM 750 MG: 500 TABLET, FILM COATED ORAL at 21:12

## 2025-07-19 RX ADMIN — PIPERACILLIN SODIUM AND TAZOBACTAM SODIUM 2.25 G: 2; .25 INJECTION, SOLUTION INTRAVENOUS at 18:18

## 2025-07-19 RX ADMIN — ATORVASTATIN CALCIUM 40 MG: 40 TABLET, FILM COATED ORAL at 21:12

## 2025-07-19 RX ADMIN — CLONIDINE HYDROCHLORIDE 0.3 MG: 0.1 TABLET ORAL at 14:46

## 2025-07-19 RX ADMIN — CARVEDILOL 6.25 MG: 6.25 TABLET, FILM COATED ORAL at 08:31

## 2025-07-19 RX ADMIN — PREGABALIN 75 MG: 75 CAPSULE ORAL at 08:31

## 2025-07-19 RX ADMIN — METHOCARBAMOL TABLETS 500 MG: 500 TABLET, COATED ORAL at 06:05

## 2025-07-19 RX ADMIN — ASPIRIN 81 MG CHEWABLE TABLET 81 MG: 81 TABLET CHEWABLE at 08:31

## 2025-07-19 ASSESSMENT — ACTIVITIES OF DAILY LIVING (ADL): LACK_OF_TRANSPORTATION: NO

## 2025-07-19 ASSESSMENT — COGNITIVE AND FUNCTIONAL STATUS - GENERAL
STANDING UP FROM CHAIR USING ARMS: A LITTLE
MOBILITY SCORE: 20
WALKING IN HOSPITAL ROOM: A LITTLE
MOVING TO AND FROM BED TO CHAIR: A LITTLE
CLIMB 3 TO 5 STEPS WITH RAILING: A LITTLE

## 2025-07-19 ASSESSMENT — PAIN SCALES - GENERAL
PAINLEVEL_OUTOF10: 10 - WORST POSSIBLE PAIN
PAINLEVEL_OUTOF10: 0 - NO PAIN

## 2025-07-19 ASSESSMENT — PAIN - FUNCTIONAL ASSESSMENT
PAIN_FUNCTIONAL_ASSESSMENT: 0-10
PAIN_FUNCTIONAL_ASSESSMENT: 0-10

## 2025-07-19 NOTE — SIGNIFICANT EVENT
"Preliminary EEG Report     This vEEG is indicative of a mild diffuse encephalopathy. No epileptiform activity or lateralizing signs are seen. .    This EEG was read from 12:51 to 13:43 on 07/19/25 . The final impression will be available tomorrow under Chart Review in the Media tab. When ready to discontinue video EEG, place \"Discontinue Continuous VEEG\" order.     Judith Ramachandran MD   [id number]  Epilepsy Fellow, [id number]  "

## 2025-07-19 NOTE — HOSPITAL COURSE
Batsheva Page is a 49 y.o. 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 Pt had HD line replaced with temporary line as was thouhgt to be source. Her cultures are positive for candida galbrata and staph epi. She later had a fever at osh and more recent cultures growing proteus. Her current culutres are still positive for yeast and GNRs. Her imaging of her lungs is concerning for pna and loculated effusions of unclear sterility. She had echo at osh that did not show abnormal signs on her prothetic valves.   ID consulted, continue cefepime and micafungin. Planning thoracenteiss to be ordered for Monday. At OSH she had complaints of pain but severe somnolence and concerns for polypharm. On presented she was almost obtunded required sternal rub to awaken on multiple occasions, sedating medications weaned off and monitoring cognitive changes. Sara agrees concerns for polypharm, pt appears to have issues with chemical coping, asking for larger doses or they will not work for her she says. At this time still evaluating encephalopathy, she has been non-adherant to her seizure meds, pending EEG. There is possible contribution from her infecton. Mentation has show more stable improvement but pt has declined care (HD, medication) while attempting to negotiate to get IV dialudid and benadryl after extensive counselling from primary and nursing.

## 2025-07-19 NOTE — CARE PLAN
The patient's goals for the shift include will be safe and free from falls or injury during the shift    The clinical goals for the shift include Pt will comply with physician orders and be agreeable to dialysis session during the shift      Problem: Skin  Goal: Decreased wound size/increased tissue granulation at next dressing change  Flowsheets (Taken 7/19/2025 1138)  Decreased wound size/increased tissue granulation at next dressing change: Utilize specialty bed per algorithm  Goal: Participates in plan/prevention/treatment measures  Flowsheets (Taken 7/19/2025 1138)  Participates in plan/prevention/treatment measures: Discuss with provider PT/OT consult  Goal: Prevent/manage excess moisture  Flowsheets (Taken 7/19/2025 1138)  Prevent/manage excess moisture: Moisturize dry skin  Goal: Prevent/minimize sheer/friction injuries  Flowsheets (Taken 7/19/2025 1138)  Prevent/minimize sheer/friction injuries:   Use pull sheet   Utilize specialty bed per algorithm

## 2025-07-19 NOTE — PROGRESS NOTES
07/19/25 1549   Discharge Planning   Living Arrangements Friends   Support Systems Friends/neighbors;Children   Assistance Needed none   Type of Residence Private residence   Expected Discharge Disposition Home H   Does the patient need discharge transport arranged? Yes   Financial Resource Strain   How hard is it for you to pay for the very basics like food, housing, medical care, and heating? Not very   Housing Stability   In the last 12 months, was there a time when you were not able to pay the mortgage or rent on time? N   At any time in the past 12 months, were you homeless or living in a shelter (including now)? N   Transportation Needs   In the past 12 months, has lack of transportation kept you from medical appointments or from getting medications? no   In the past 12 months, has lack of transportation kept you from meetings, work, or from getting things needed for daily living? No   Patient Choice   Provider Choice list and CMS website (https://medicare.gov/care-compare#search) for post-acute Quality and Resource Measure Data were provided and reviewed with: Patient   Intensity of Service   Intensity of Service 0-30 min     PCP: Zhou Pedersen-nephrologist (Per pt she doesn't have a PCP because she sees her nephro MD often)  DATE OF LAST VISIT: a few weeks ago   PHARMACY: [facility]   RECENT FALLS: denies   EQUIPMENT USED IN HOME: cane-doesn't use   HOME O2/CPAP/NEBS: N/A   TRANSPORT HOME: friend or dtr   CURRENT HC:  N/A   HD SCHEDULE: MWF at [facility] at 5am     Address, phone and emergency contact information verified. Per pt she was independent with ADLs prior to admission-pt denies any PT/OT needs on DC at this time but pt states if she needs IV abx on DC she would be agreeable and AOC is [facility]. All questions and concerns answered. Will continue to follow.

## 2025-07-19 NOTE — PROGRESS NOTES
Assessment/Plan   Batsheva Page is a 49 y.o. 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 Pt had HD line replaced with temporary line as was thouhgt to be source. Her cultures are positive for candida galbrata and staph epi. She later had a fever at osh and more recent cultures growing proteus. Her current culutres are still positive for yeast and GNRs. Her imaging of her lungs is concerning for pna and loculated effusions of unclear sterility. She had echo at osh that did not show abnormal signs on her prothetic valves.   ID consulted, continue cefepime and micafungin. Planning thoracenteiss to be ordered for Monday. At OSH she had complaints of pain but severe somnolence and concerns for polypharm. On presented she was almost obtunded required sternal rub to awaken on multiple occasions, sedating medications weaned off and monitoring cognitive changes. Sara agrees concerns for polypharm, pt appears to have issues with chemical coping, asking for larger doses or they will not work for her she says. At this time still evaluating encephalopathy, she has been non-adherant to her seizure meds, pending EEG. There is possible contribution from her infecton. Mentation has show more stable improvement but pt has declined care (HD, medication) while attempting to negotiate to get IV dialudid and benadryl after extensive counselling from primary and nursing. .    Proteus, staph epi bacteremia  Maida galbrata fungemia  PNA with loculated effusion  H/o bacteremia, graft infection, endocarditis  - suspecting line v. Effusions as source, possible valves given replacements but pt had normal echo. Will see if pt needs  ramon  - no current need for optho exam unless develops symtpoms per discussion.   Repeat blood cultures for clearance, will order for 7/20  - will order thora for 7/21.   - continue micafungin and cefepime, appreciate ID recs.     ESRD (end stage renal disease) (Multi)  Hyperparathyroidism  - Hd per nephro  - continue management of hyperparathyroidism per nephro  - R fem line replaced, left sheath in place per record, will need new line once source control achieve  - pt counselled about her declining hd, possible need for a line holiday if unable to clear cultures, need to maintain RRP, advised that she will not be negoting pain medication or benadryl IV for care espicially when there are known safety risks. Today pt was agreeable to proceed with HD.     AMS  - suspect ams 2/2 polypharm in setting of esrd. Has h/o opioid dependence. Appears to have chemical coping. Unclear indicaiton of opioids for Restless leg syndrome as well. There were concerns with family about her opioid use in the hospital and level of sedation, pt continuing to ask for these medications and benadryl despite not able to be worken up. Prior to dc had meds changed from iv to oral. She requiried sternal rub upon presentation to awaken, could not maintain her attention/alertness. After holding meds this has improved, yet pt despite counselling still requesting and declining care for medication. Had conversation with patient and family, family reports pt will act up at times and to involve family to help to talk to patient if needed  - overall improved. Will avoid opioids and benadryl for now, still not at baseline, also with poor insight. She has asked to dc home on 2 occasions despite discussion of seriousness of her condition.   - she does not have capacity to sign ama at this time.   - eeg ordered, pt has not been adherant to her AEDs  - could be contributed by infection as above.        Scheduled outpatient appointments in system:   No future  appointments.  ---------------------------------------------------------------------------------------------------  Subjective   No events,  Despite 2 conversations yesterday regarding her care needs and illness and one with her daughter present, pt declined hd yesterday, did state she wanted to be discharged, continued to ask for sedating medication. Today she is more and appears more calm and interactive today. She was still not interested in hd. We discussed possible issues and need to go a period without a line, issues with goiong a prolonged period without HD. Reports she feels bad but does not specify. Denies uri sx, dizziness, headache, vision change. Reports she hurts everywhere and has anxiety.  Discussed her results and work up needs and pt stated she wanted to go home. She stated this yesterday as well. After further counselling she was agreeable to go to HD. Nursing notified.      ---------------------------------------------------------------------------------------------------  Objective   Last Recorded Vitals  Blood pressure 109/63, pulse 64, temperature 36.4 °C (97.5 °F), resp. rate 17, weight 64.7 kg (142 lb 10.2 oz), SpO2 100%.  Intake/Output last 3 Shifts:  I/O last 3 completed shifts:  In: 581 (9 mL/kg) [P.O.:581]  Out: - (0 mL/kg)   Weight: 64.7 kg     Physical Exam  Vitals and nursing note reviewed.   Constitutional:       General: She is not in acute distress.     Appearance: Normal appearance. She is not ill-appearing or toxic-appearing.      Comments: Appear sleepy, but maintains alertness and attention, maintains appropriate conversatin   HENT:      Head: Normocephalic and atraumatic.      Mouth/Throat:      Mouth: Mucous membranes are moist.     Eyes:      General: No scleral icterus.     Extraocular Movements: Extraocular movements intact.      Conjunctiva/sclera: Conjunctivae normal.     Neck:      Comments: No jvd  Cardiovascular:      Rate and Rhythm: Normal rate and regular rhythm.       Heart sounds: S1 normal and S2 normal. Murmur heard.   Pulmonary:      Effort: Pulmonary effort is normal. No respiratory distress.      Breath sounds: No wheezing, rhonchi or rales.   Abdominal:      General: Bowel sounds are normal. There is no distension.      Palpations: Abdomen is soft.      Tenderness: There is no abdominal tenderness. There is no guarding or rebound.     Musculoskeletal:         General: No swelling or deformity.      Cervical back: Neck supple.     Skin:     General: Skin is warm and dry.      Findings: Lesion present. No rash.      Comments: Mulitple lesions to lower extremities, scattered scabs     Neurological:      General: No focal deficit present.      Mental Status: She is alert and oriented to person, place, and time. Mental status is at baseline.      Sensory: Sensory deficit present.      Motor: No weakness.     Psychiatric:         Mood and Affect: Mood normal.      Comments: Impaired insight         Relevant Results  Lab Results   Component Value Date    WBC 8.4 07/18/2025    HGB 8.2 (L) 07/18/2025    HCT 27.1 (L) 07/18/2025    MCV 99 07/18/2025     (L) 07/18/2025      Lab Results   Component Value Date    GLUCOSE 101 (H) 07/18/2025    CALCIUM 7.3 (L) 07/18/2025     (L) 07/18/2025    K 4.2 07/18/2025    CO2 25 07/18/2025    CL 95 (L) 07/18/2025    BUN 37 (H) 07/18/2025    CREATININE 8.28 (H) 07/18/2025     Scheduled medications  Scheduled Medications[1]  Continuous medications  Continuous Medications[2]  PRN medications  PRN Medications[3]    John Zavala MD           [1] [Held by provider] amLODIPine, 5 mg, oral, Daily  aspirin, 81 mg, oral, Daily  atorvastatin, 40 mg, oral, Nightly  calcitriol, 0.5 mcg, oral, Daily  carvedilol, 6.25 mg, oral, BID  cloNIDine, 0.3 mg, oral, q8h KIMBERLY  ergocalciferol, 1.25 mg, oral, Weekly  levETIRAcetam, 750 mg, oral, Nightly  methocarbamol, 500 mg, oral, q8h KIMBERLY  micafungin, 100 mg, intravenous, q24h  pantoprazole, 40 mg,  oral, Daily before breakfast  patiromer calcium sorbitex, 8.4 g, oral, Daily  piperacillin-tazobactam, 2.25 g, intravenous, q8h  pregabalin, 75 mg, oral, Daily  [2]    [3] PRN medications: acetaminophen, dextrose, dextrose, [Held by provider] diphenhydrAMINE, diphenhydramine-zinc acetate, glucagon, glucagon, hydrALAZINE, ipratropium-albuteroL, naloxone, [Held by provider] oxyCODONE

## 2025-07-20 LAB
ALBUMIN SERPL BCP-MCNC: 3.1 G/DL (ref 3.4–5)
ANION GAP SERPL CALC-SCNC: 25 MMOL/L (ref 10–20)
BACTERIA BLD AEROBE CULT: ABNORMAL
BACTERIA BLD CULT: ABNORMAL
BACTERIA BLD CULT: NORMAL
BACTERIA BLD CULT: NORMAL
BUN SERPL-MCNC: 56 MG/DL (ref 6–23)
CALCIUM SERPL-MCNC: 6.9 MG/DL (ref 8.6–10.6)
CHLORIDE SERPL-SCNC: 92 MMOL/L (ref 98–107)
CO2 SERPL-SCNC: 22 MMOL/L (ref 21–32)
CREAT SERPL-MCNC: 10.06 MG/DL (ref 0.5–1.05)
EGFRCR SERPLBLD CKD-EPI 2021: 4 ML/MIN/1.73M*2
ERYTHROCYTE [DISTWIDTH] IN BLOOD BY AUTOMATED COUNT: 16.9 % (ref 11.5–14.5)
GLUCOSE BLD MANUAL STRIP-MCNC: 105 MG/DL (ref 74–99)
GLUCOSE BLD MANUAL STRIP-MCNC: 127 MG/DL (ref 74–99)
GLUCOSE BLD MANUAL STRIP-MCNC: 142 MG/DL (ref 74–99)
GLUCOSE BLD MANUAL STRIP-MCNC: 96 MG/DL (ref 74–99)
GLUCOSE SERPL-MCNC: 90 MG/DL (ref 74–99)
GRAM STN SPEC: ABNORMAL
HCT VFR BLD AUTO: 27.4 % (ref 36–46)
HGB BLD-MCNC: 8.4 G/DL (ref 12–16)
MCH RBC QN AUTO: 30.3 PG (ref 26–34)
MCHC RBC AUTO-ENTMCNC: 30.7 G/DL (ref 32–36)
MCV RBC AUTO: 99 FL (ref 80–100)
NRBC BLD-RTO: 0 /100 WBCS (ref 0–0)
PHOSPHATE SERPL-MCNC: 4.3 MG/DL (ref 2.5–4.9)
PLATELET # BLD AUTO: 141 X10*3/UL (ref 150–450)
POTASSIUM SERPL-SCNC: 4.7 MMOL/L (ref 3.5–5.3)
RBC # BLD AUTO: 2.77 X10*6/UL (ref 4–5.2)
SODIUM SERPL-SCNC: 134 MMOL/L (ref 136–145)
WBC # BLD AUTO: 8.1 X10*3/UL (ref 4.4–11.3)

## 2025-07-20 PROCEDURE — 1210000001 HC SEMI-PRIVATE ROOM DAILY

## 2025-07-20 PROCEDURE — 87075 CULTR BACTERIA EXCEPT BLOOD: CPT | Performed by: STUDENT IN AN ORGANIZED HEALTH CARE EDUCATION/TRAINING PROGRAM

## 2025-07-20 PROCEDURE — 2500000001 HC RX 250 WO HCPCS SELF ADMINISTERED DRUGS (ALT 637 FOR MEDICARE OP): Performed by: STUDENT IN AN ORGANIZED HEALTH CARE EDUCATION/TRAINING PROGRAM

## 2025-07-20 PROCEDURE — 36415 COLL VENOUS BLD VENIPUNCTURE: CPT | Performed by: STUDENT IN AN ORGANIZED HEALTH CARE EDUCATION/TRAINING PROGRAM

## 2025-07-20 PROCEDURE — 95715 VEEG EA 12-26HR INTMT MNTR: CPT

## 2025-07-20 PROCEDURE — 85027 COMPLETE CBC AUTOMATED: CPT | Performed by: STUDENT IN AN ORGANIZED HEALTH CARE EDUCATION/TRAINING PROGRAM

## 2025-07-20 PROCEDURE — 95700 EEG CONT REC W/VID EEG TECH: CPT

## 2025-07-20 PROCEDURE — 2500000004 HC RX 250 GENERAL PHARMACY W/ HCPCS (ALT 636 FOR OP/ED): Performed by: STUDENT IN AN ORGANIZED HEALTH CARE EDUCATION/TRAINING PROGRAM

## 2025-07-20 PROCEDURE — 99233 SBSQ HOSP IP/OBS HIGH 50: CPT | Performed by: STUDENT IN AN ORGANIZED HEALTH CARE EDUCATION/TRAINING PROGRAM

## 2025-07-20 PROCEDURE — 2500000005 HC RX 250 GENERAL PHARMACY W/O HCPCS: Performed by: STUDENT IN AN ORGANIZED HEALTH CARE EDUCATION/TRAINING PROGRAM

## 2025-07-20 PROCEDURE — 82947 ASSAY GLUCOSE BLOOD QUANT: CPT

## 2025-07-20 PROCEDURE — 80069 RENAL FUNCTION PANEL: CPT | Performed by: STUDENT IN AN ORGANIZED HEALTH CARE EDUCATION/TRAINING PROGRAM

## 2025-07-20 RX ORDER — ACETAMINOPHEN 325 MG/1
975 TABLET ORAL EVERY 8 HOURS
Status: DISCONTINUED | OUTPATIENT
Start: 2025-07-20 | End: 2025-07-22

## 2025-07-20 RX ORDER — DICLOFENAC SODIUM 10 MG/G
4 GEL TOPICAL 3 TIMES DAILY
Status: DISCONTINUED | OUTPATIENT
Start: 2025-07-20 | End: 2025-07-23 | Stop reason: HOSPADM

## 2025-07-20 RX ORDER — LIDOCAINE 40 MG/G
CREAM TOPICAL 3 TIMES DAILY
Status: DISCONTINUED | OUTPATIENT
Start: 2025-07-20 | End: 2025-07-23 | Stop reason: HOSPADM

## 2025-07-20 RX ORDER — METHOCARBAMOL 500 MG/1
500 TABLET, FILM COATED ORAL EVERY 6 HOURS PRN
Status: DISCONTINUED | OUTPATIENT
Start: 2025-07-20 | End: 2025-07-23 | Stop reason: HOSPADM

## 2025-07-20 RX ADMIN — ATORVASTATIN CALCIUM 40 MG: 40 TABLET, FILM COATED ORAL at 21:52

## 2025-07-20 RX ADMIN — LEVETIRACETAM 750 MG: 500 TABLET, FILM COATED ORAL at 21:52

## 2025-07-20 RX ADMIN — ACETAMINOPHEN 975 MG: 325 TABLET ORAL at 15:06

## 2025-07-20 RX ADMIN — METHOCARBAMOL TABLETS 500 MG: 500 TABLET, COATED ORAL at 15:06

## 2025-07-20 RX ADMIN — LIDOCAINE 4%: 4 CREAM TOPICAL at 18:10

## 2025-07-20 RX ADMIN — METHOCARBAMOL TABLETS 500 MG: 500 TABLET, COATED ORAL at 06:24

## 2025-07-20 RX ADMIN — PIPERACILLIN SODIUM AND TAZOBACTAM SODIUM 2.25 G: 2; .25 INJECTION, SOLUTION INTRAVENOUS at 02:21

## 2025-07-20 RX ADMIN — MICAFUNGIN SODIUM 100 MG: 100 INJECTION, POWDER, LYOPHILIZED, FOR SOLUTION INTRAVENOUS at 23:53

## 2025-07-20 RX ADMIN — CLONIDINE HYDROCHLORIDE 0.1 MG: 0.1 TABLET ORAL at 15:06

## 2025-07-20 RX ADMIN — CLONIDINE HYDROCHLORIDE 0.1 MG: 0.1 TABLET ORAL at 00:10

## 2025-07-20 RX ADMIN — METHOCARBAMOL 500 MG: 500 TABLET ORAL at 21:52

## 2025-07-20 RX ADMIN — PIPERACILLIN SODIUM AND TAZOBACTAM SODIUM 2.25 G: 2; .25 INJECTION, SOLUTION INTRAVENOUS at 18:10

## 2025-07-20 RX ADMIN — MICAFUNGIN SODIUM 100 MG: 100 INJECTION, POWDER, LYOPHILIZED, FOR SOLUTION INTRAVENOUS at 00:10

## 2025-07-20 RX ADMIN — PIPERACILLIN SODIUM AND TAZOBACTAM SODIUM 2.25 G: 2; .25 INJECTION, SOLUTION INTRAVENOUS at 10:39

## 2025-07-20 RX ADMIN — CLONIDINE HYDROCHLORIDE 0.1 MG: 0.1 TABLET ORAL at 23:53

## 2025-07-20 RX ADMIN — PANTOPRAZOLE SODIUM 40 MG: 40 TABLET, DELAYED RELEASE ORAL at 06:24

## 2025-07-20 ASSESSMENT — PAIN - FUNCTIONAL ASSESSMENT: PAIN_FUNCTIONAL_ASSESSMENT: 0-10

## 2025-07-20 ASSESSMENT — PAIN SCALES - GENERAL: PAINLEVEL_OUTOF10: 7

## 2025-07-20 NOTE — NURSING NOTE
Patient c/o restless legs and generalized pain throughout the night but refused tylenol and robaxin. Pt agreed and took AM dose of robaxin this morning. Pt remained oriented x4 but showed possible signs of opioid withdrawal including chills, diaphoresis, irritability, restlessness, and tremors. Fever noted on AM vitals however room temp was high and pt had 4 blankets on. Notified MD Garber and applied cold packs to b/l groin and b/l axillary and gave pt a personal fan. Temp normalized within 30 minutes. Pt voiced throughout the night that she wanted oxycodone and that she wants to leave today to go home. MD Garber made aware. Patient still hooked up to EEG monitor.   PAM Johnson RN

## 2025-07-20 NOTE — PROGRESS NOTES
Assessment/Plan   Batsheva Page is a 49 y.o. 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 Pt had HD line replaced with temporary line as was thouhgt to be source. Her cultures are positive for candida galbrata and staph epi. She later had a fever at osh and more recent cultures growing proteus. Her current culutres are still positive for yeast and GNRs. Her imaging of her lungs is concerning for pna and loculated effusions of unclear sterility. She had echo at osh that did not show abnormal signs on her prothetic valves.   ID consulted, continue cefepime and micafungin. Planning thoracenteiss to be ordered for Monday. At OSH she had complaints of pain but severe somnolence and concerns for polypharm. On presented she was almost obtunded required sternal rub to awaken on multiple occasions, sedating medications weaned off and monitoring cognitive changes. Sara agrees concerns for polypharm, pt appears to have issues with chemical coping, asking for larger doses or they will not work for her she says. At this time still evaluating encephalopathy, she has been non-adherant to her seizure meds, pending EEG. There is possible contribution from her infecton. Mentation has show more stable improvement but pt has declined care (HD, medication) while attempting to negotiate to get IV dialudid and benadryl after extensive counselling from primary and nursing. . Pt has been more alert but still sleeps most of the day. EEG with signs of moderate encephalopathy. Optimizing non-sedating pain medications, pt remains febrile. Cultures remain positive. Pt is ordered a LAURA and thoracentesis. NPO at midnight in  case of need for  sedation tomorrow.     Proteus, staph epi bacteremia  Maida galbrata fungemia  PNA with loculated effusion  H/o bacteremia, graft infection, endocarditis  - suspecting line v. Effusions as source, possible valves given replacements but pt had normal echo.  - no current need for optho exam unless develops symtpoms per discussion.   Repeat blood cultures for clearance, will order for 7/20  - will order thora for 7/21.   - continue micafungin and cefepime, appreciate ID recs.   -pt remains febrile. Cultures remain positive. Pt is ordered a LAURA and thoracentesis. NPO at midnight in  case of need for sedation tomorrow.     ESRD (end stage renal disease) (Multi)  Hyperparathyroidism  - Hd per nephro  - continue management of hyperparathyroidism per nephro  - R fem line replaced, left sheath in place per record, will need new line once source control achieve  - pt counselled about her declining hd, possible need for a line holiday if unable to clear cultures, need to maintain RRP, advised that she will not be negoting pain medication or benadryl IV for care espicially when there are known safety risks. Today pt was agreeable to proceed with HD.   - Pt likely to need line replaced again.   - at osh she was reporting looking into PD, she will talk about that op, unclear how that will affect her infection risk.     AMS  - suspect ams 2/2 polypharm in setting of esrd. Has h/o opioid dependence. Appears to have chemical coping. Unclear indicaiton of opioids for Restless leg syndrome as well. There were concerns with family about her opioid use in the hospital and level of sedation, pt continuing to ask for these medications and benadryl despite not able to be worken up. Prior to dc had meds changed from iv to oral. She requiried sternal rub upon presentation to awaken, could not maintain her attention/alertness. After holding meds this has improved, yet pt despite counselling still requesting and declining care for  medication. Had conversation with patient and family, family reports pt will act up at times and to involve family to help to talk to patient if needed  - overall improved. Will avoid opioids and benadryl for now, still not at baseline, also with poor insight. She has asked to dc home on 2 occasions despite discussion of seriousness of her condition.   - she does not have capacity to sign ama at this time.   - eeg ordered, pt has not been adherant to her AEDs  - could be contributed by infection as above.   -EEG with signs of moderate encephalopathy. Continue to avoid sedating medicaitons. Pt is on lyrica as well as methocarbamol. Is now reporting pain again. Made methocarbamol prn, monitor for sedation.      Scheduled outpatient appointments in system:   Future Appointments   Date Time Provider Department Center   7/21/2025  7:00 AM HD [facility]     ---------------------------------------------------------------------------------------------------  Subjective   No events,  pt sleepy but arrousable, able to maintain conversation, reports overall feeling better but appears very drowsy. She denied any complaints including dizziness, cp, sob, abd or LUTS. Pt later was more alert and crying per nursing about pain, reported generalized and more in righ tleg. Assessing further.      ---------------------------------------------------------------------------------------------------  Objective   Last Recorded Vitals  Blood pressure 132/70, pulse 83, temperature 36.5 °C (97.7 °F), resp. rate 16, weight 64.7 kg (142 lb 10.2 oz), SpO2 96%.  Intake/Output last 3 Shifts:  No intake/output data recorded.    Physical Exam  Vitals and nursing note reviewed.   Constitutional:       General: She is not in acute distress.     Appearance: Normal appearance. She is not ill-appearing or toxic-appearing.      Comments: Appear sleepy, but maintains alertness and attention, maintains appropriate conversatin    HENT:      Head: Normocephalic and atraumatic.      Mouth/Throat:      Mouth: Mucous membranes are moist.     Eyes:      General: No scleral icterus.     Extraocular Movements: Extraocular movements intact.      Conjunctiva/sclera: Conjunctivae normal.     Neck:      Comments: No jvd  Cardiovascular:      Rate and Rhythm: Normal rate and regular rhythm.      Heart sounds: S1 normal and S2 normal. Murmur heard.   Pulmonary:      Effort: Pulmonary effort is normal. No respiratory distress.      Breath sounds: No wheezing, rhonchi or rales.   Abdominal:      General: Bowel sounds are normal. There is no distension.      Palpations: Abdomen is soft.      Tenderness: There is no abdominal tenderness. There is no guarding or rebound.     Musculoskeletal:         General: No swelling or deformity.      Cervical back: Neck supple.     Skin:     General: Skin is warm and dry.      Findings: Lesion present. No rash.      Comments: Mulitple lesions to lower extremities, scattered scabs     Neurological:      General: No focal deficit present.      Mental Status: She is alert and oriented to person, place, and time. Mental status is at baseline.      Sensory: Sensory deficit present.      Motor: No weakness.     Psychiatric:         Mood and Affect: Mood normal.      Comments: Impaired insight         Relevant Results  Lab Results   Component Value Date    WBC 8.1 07/20/2025    HGB 8.4 (L) 07/20/2025    HCT 27.4 (L) 07/20/2025    MCV 99 07/20/2025     (L) 07/20/2025      Lab Results   Component Value Date    GLUCOSE 90 07/20/2025    CALCIUM 6.9 (L) 07/20/2025     (L) 07/20/2025    K 4.7 07/20/2025    CO2 22 07/20/2025    CL 92 (L) 07/20/2025    BUN 56 (H) 07/20/2025    CREATININE 10.06 (H) 07/20/2025     Scheduled medications  Scheduled Medications[1]  Continuous medications  Continuous Medications[2]  PRN medications  PRN Medications[3]    John Zavala MD             [1] acetaminophen, 975 mg, oral,  q8h  [Held by provider] amLODIPine, 5 mg, oral, Daily  aspirin, 81 mg, oral, Daily  atorvastatin, 40 mg, oral, Nightly  calcitriol, 0.5 mcg, oral, Daily  [Held by provider] carvedilol, 6.25 mg, oral, BID  cloNIDine, 0.1 mg, oral, q8h KIMBERLY  diclofenac sodium, 4 g, Topical, TID  ergocalciferol, 1.25 mg, oral, Weekly  levETIRAcetam, 750 mg, oral, Nightly  lidocaine, , Topical, TID  micafungin, 100 mg, intravenous, q24h  pantoprazole, 40 mg, oral, Daily before breakfast  patiromer calcium sorbitex, 8.4 g, oral, Daily  piperacillin-tazobactam, 2.25 g, intravenous, q8h  pregabalin, 75 mg, oral, Daily  sodium chloride, 500 mL, intravenous, Once     [2]    [3] PRN medications: dextrose, dextrose, [Held by provider] diphenhydrAMINE, diphenhydramine-zinc acetate, glucagon, glucagon, hydrALAZINE, ipratropium-albuteroL, methocarbamol, naloxone, [Held by provider] oxyCODONE

## 2025-07-20 NOTE — NURSING NOTE
"Patient lethargic the morning. Awakes to voice and is oriented times 3. Patient refusing morning meds stating \"I'm good\" while shaking her no when this nurse attempted to give meds. This nurse verbally confirmed with patient that she did not want meds and patient states no. This nurse educated patient on medications she was refusing, Refusal maintained. Patient appears restless and jittery once awake. Patient denies pain. no other signs or symptoms of distress. Will continue to monitor  "

## 2025-07-21 ENCOUNTER — APPOINTMENT (OUTPATIENT)
Dept: CARDIOLOGY | Facility: HOSPITAL | Age: 50
End: 2025-07-21
Payer: MEDICARE

## 2025-07-21 ENCOUNTER — ANESTHESIA EVENT (OUTPATIENT)
Dept: CARDIOLOGY | Facility: HOSPITAL | Age: 50
DRG: 871 | End: 2025-07-21
Payer: MEDICARE

## 2025-07-21 ENCOUNTER — APPOINTMENT (OUTPATIENT)
Dept: CARDIOLOGY | Facility: HOSPITAL | Age: 50
DRG: 871 | End: 2025-07-21
Payer: MEDICARE

## 2025-07-21 ENCOUNTER — APPOINTMENT (OUTPATIENT)
Dept: VASCULAR MEDICINE | Facility: HOSPITAL | Age: 50
DRG: 871 | End: 2025-07-21
Payer: MEDICARE

## 2025-07-21 ENCOUNTER — ANESTHESIA (OUTPATIENT)
Dept: CARDIOLOGY | Facility: HOSPITAL | Age: 50
DRG: 871 | End: 2025-07-21
Payer: MEDICARE

## 2025-07-21 ENCOUNTER — APPOINTMENT (OUTPATIENT)
Dept: DIALYSIS | Facility: HOSPITAL | Age: 50
End: 2025-07-21
Payer: MEDICARE

## 2025-07-21 ENCOUNTER — APPOINTMENT (OUTPATIENT)
Dept: RADIOLOGY | Facility: HOSPITAL | Age: 50
DRG: 871 | End: 2025-07-21
Payer: MEDICARE

## 2025-07-21 VITALS
DIASTOLIC BLOOD PRESSURE: 75 MMHG | BODY MASS INDEX: 23.74 KG/M2 | SYSTOLIC BLOOD PRESSURE: 129 MMHG | HEART RATE: 76 BPM | WEIGHT: 142.64 LBS | OXYGEN SATURATION: 99 % | RESPIRATION RATE: 17 BRPM | TEMPERATURE: 97.3 F

## 2025-07-21 PROBLEM — J44.9 CHRONIC OBSTRUCTIVE PULMONARY DISEASE (MULTI): Status: ACTIVE | Noted: 2025-07-21

## 2025-07-21 PROBLEM — I63.9 CVA (CEREBRAL VASCULAR ACCIDENT) (MULTI): Status: ACTIVE | Noted: 2025-07-21

## 2025-07-21 PROBLEM — F11.90 OPIOID USE DISORDER: Status: ACTIVE | Noted: 2025-07-21

## 2025-07-21 PROBLEM — R21 RASH: Status: ACTIVE | Noted: 2025-07-21

## 2025-07-21 LAB
ALBUMIN SERPL BCP-MCNC: 3.1 G/DL (ref 3.4–5)
ANION GAP SERPL CALC-SCNC: 25 MMOL/L (ref 10–20)
BACTERIA BLD AEROBE CULT: ABNORMAL
BACTERIA BLD CULT: ABNORMAL
BUN SERPL-MCNC: 66 MG/DL (ref 6–23)
CALCIUM SERPL-MCNC: 6.6 MG/DL (ref 8.6–10.6)
CHLORIDE SERPL-SCNC: 91 MMOL/L (ref 98–107)
CO2 SERPL-SCNC: 23 MMOL/L (ref 21–32)
CREAT SERPL-MCNC: 11.33 MG/DL (ref 0.5–1.05)
EGFRCR SERPLBLD CKD-EPI 2021: 4 ML/MIN/1.73M*2
EJECTION FRACTION: 58 %
ERYTHROCYTE [DISTWIDTH] IN BLOOD BY AUTOMATED COUNT: 16.9 % (ref 11.5–14.5)
GLUCOSE BLD MANUAL STRIP-MCNC: 104 MG/DL (ref 74–99)
GLUCOSE BLD MANUAL STRIP-MCNC: 116 MG/DL (ref 74–99)
GLUCOSE BLD MANUAL STRIP-MCNC: 140 MG/DL (ref 74–99)
GLUCOSE BLD MANUAL STRIP-MCNC: 497 MG/DL (ref 74–99)
GLUCOSE SERPL-MCNC: 89 MG/DL (ref 74–99)
GRAM STN SPEC: ABNORMAL
HCT VFR BLD AUTO: 30 % (ref 36–46)
HGB BLD-MCNC: 9 G/DL (ref 12–16)
MCH RBC QN AUTO: 29 PG (ref 26–34)
MCHC RBC AUTO-ENTMCNC: 30 G/DL (ref 32–36)
MCV RBC AUTO: 97 FL (ref 80–100)
NRBC BLD-RTO: 0 /100 WBCS (ref 0–0)
PHOSPHATE SERPL-MCNC: 6.6 MG/DL (ref 2.5–4.9)
PLATELET # BLD AUTO: 192 X10*3/UL (ref 150–450)
POTASSIUM SERPL-SCNC: 4.4 MMOL/L (ref 3.5–5.3)
POTASSIUM SERPL-SCNC: 7.8 MMOL/L (ref 3.5–5.3)
RBC # BLD AUTO: 3.1 X10*6/UL (ref 4–5.2)
SODIUM SERPL-SCNC: 131 MMOL/L (ref 136–145)
WBC # BLD AUTO: 8.9 X10*3/UL (ref 4.4–11.3)

## 2025-07-21 PROCEDURE — 82947 ASSAY GLUCOSE BLOOD QUANT: CPT

## 2025-07-21 PROCEDURE — 99152 MOD SED SAME PHYS/QHP 5/>YRS: CPT

## 2025-07-21 PROCEDURE — 3700000001 HC GENERAL ANESTHESIA TIME - INITIAL BASE CHARGE

## 2025-07-21 PROCEDURE — 2500000001 HC RX 250 WO HCPCS SELF ADMINISTERED DRUGS (ALT 637 FOR MEDICARE OP): Performed by: STUDENT IN AN ORGANIZED HEALTH CARE EDUCATION/TRAINING PROGRAM

## 2025-07-21 PROCEDURE — 85027 COMPLETE CBC AUTOMATED: CPT | Performed by: STUDENT IN AN ORGANIZED HEALTH CARE EDUCATION/TRAINING PROGRAM

## 2025-07-21 PROCEDURE — 36415 COLL VENOUS BLD VENIPUNCTURE: CPT | Performed by: STUDENT IN AN ORGANIZED HEALTH CARE EDUCATION/TRAINING PROGRAM

## 2025-07-21 PROCEDURE — 93320 DOPPLER ECHO COMPLETE: CPT

## 2025-07-21 PROCEDURE — 1210000001 HC SEMI-PRIVATE ROOM DAILY

## 2025-07-21 PROCEDURE — 8010000001 HC DIALYSIS - HEMODIALYSIS PER DAY

## 2025-07-21 PROCEDURE — 93312 ECHO TRANSESOPHAGEAL: CPT

## 2025-07-21 PROCEDURE — 93971 EXTREMITY STUDY: CPT

## 2025-07-21 PROCEDURE — 80069 RENAL FUNCTION PANEL: CPT | Performed by: STUDENT IN AN ORGANIZED HEALTH CARE EDUCATION/TRAINING PROGRAM

## 2025-07-21 PROCEDURE — 99233 SBSQ HOSP IP/OBS HIGH 50: CPT | Performed by: STUDENT IN AN ORGANIZED HEALTH CARE EDUCATION/TRAINING PROGRAM

## 2025-07-21 PROCEDURE — 84132 ASSAY OF SERUM POTASSIUM: CPT | Performed by: STUDENT IN AN ORGANIZED HEALTH CARE EDUCATION/TRAINING PROGRAM

## 2025-07-21 PROCEDURE — 2500000005 HC RX 250 GENERAL PHARMACY W/O HCPCS

## 2025-07-21 PROCEDURE — A93312 PR ECHO HEART,TRANSESOPHAGEAL,COMPLETE: Performed by: ANESTHESIOLOGY

## 2025-07-21 PROCEDURE — 2500000004 HC RX 250 GENERAL PHARMACY W/ HCPCS (ALT 636 FOR OP/ED): Performed by: STUDENT IN AN ORGANIZED HEALTH CARE EDUCATION/TRAINING PROGRAM

## 2025-07-21 PROCEDURE — A93312 PR ECHO HEART,TRANSESOPHAGEAL,COMPLETE

## 2025-07-21 PROCEDURE — 93971 EXTREMITY STUDY: CPT | Performed by: INTERNAL MEDICINE

## 2025-07-21 PROCEDURE — 93325 DOPPLER ECHO COLOR FLOW MAPG: CPT

## 2025-07-21 PROCEDURE — 99222 1ST HOSP IP/OBS MODERATE 55: CPT | Performed by: INTERNAL MEDICINE

## 2025-07-21 PROCEDURE — 3700000002 HC GENERAL ANESTHESIA TIME - EACH INCREMENTAL 1 MINUTE

## 2025-07-21 PROCEDURE — 2500000004 HC RX 250 GENERAL PHARMACY W/ HCPCS (ALT 636 FOR OP/ED)

## 2025-07-21 RX ORDER — LIDOCAINE HYDROCHLORIDE 10 MG/ML
0.1 INJECTION, SOLUTION INFILTRATION; PERINEURAL ONCE
OUTPATIENT
Start: 2025-07-21 | End: 2025-07-21

## 2025-07-21 RX ORDER — DROPERIDOL 2.5 MG/ML
0.62 INJECTION, SOLUTION INTRAMUSCULAR; INTRAVENOUS ONCE AS NEEDED
OUTPATIENT
Start: 2025-07-21

## 2025-07-21 RX ORDER — ALBUTEROL SULFATE 0.83 MG/ML
2.5 SOLUTION RESPIRATORY (INHALATION) ONCE AS NEEDED
OUTPATIENT
Start: 2025-07-21

## 2025-07-21 RX ORDER — OXYCODONE HYDROCHLORIDE 5 MG/1
10 TABLET ORAL EVERY 4 HOURS PRN
Refills: 0 | OUTPATIENT
Start: 2025-07-21

## 2025-07-21 RX ORDER — HYDRALAZINE HYDROCHLORIDE 20 MG/ML
5 INJECTION INTRAMUSCULAR; INTRAVENOUS EVERY 10 MIN PRN
OUTPATIENT
Start: 2025-07-21

## 2025-07-21 RX ORDER — PROPOFOL 10 MG/ML
INJECTION, EMULSION INTRAVENOUS AS NEEDED
Status: DISCONTINUED | OUTPATIENT
Start: 2025-07-21 | End: 2025-07-21

## 2025-07-21 RX ORDER — LABETALOL HYDROCHLORIDE 5 MG/ML
5 INJECTION, SOLUTION INTRAVENOUS EVERY 10 MIN PRN
OUTPATIENT
Start: 2025-07-21

## 2025-07-21 RX ORDER — ACETAMINOPHEN 325 MG/1
650 TABLET ORAL EVERY 4 HOURS PRN
OUTPATIENT
Start: 2025-07-21

## 2025-07-21 RX ORDER — FENTANYL CITRATE 50 UG/ML
12.5 INJECTION, SOLUTION INTRAMUSCULAR; INTRAVENOUS EVERY 5 MIN PRN
Refills: 0 | OUTPATIENT
Start: 2025-07-21

## 2025-07-21 RX ORDER — PHENYLEPHRINE HYDROCHLORIDE 10 MG/ML
INJECTION INTRAVENOUS AS NEEDED
Status: DISCONTINUED | OUTPATIENT
Start: 2025-07-21 | End: 2025-07-21

## 2025-07-21 RX ORDER — SODIUM CHLORIDE, SODIUM LACTATE, POTASSIUM CHLORIDE, CALCIUM CHLORIDE 600; 310; 30; 20 MG/100ML; MG/100ML; MG/100ML; MG/100ML
5 INJECTION, SOLUTION INTRAVENOUS CONTINUOUS
OUTPATIENT
Start: 2025-07-21 | End: 2025-07-22

## 2025-07-21 RX ORDER — OXYCODONE AND ACETAMINOPHEN 5; 325 MG/1; MG/1
1 TABLET ORAL EVERY 4 HOURS PRN
Refills: 0 | OUTPATIENT
Start: 2025-07-21

## 2025-07-21 RX ORDER — LIDOCAINE HYDROCHLORIDE 20 MG/ML
SOLUTION OROPHARYNGEAL AS NEEDED
Status: DISCONTINUED | OUTPATIENT
Start: 2025-07-21 | End: 2025-07-21 | Stop reason: HOSPADM

## 2025-07-21 RX ADMIN — PROPOFOL 100 MCG/KG/MIN: 10 INJECTION, EMULSION INTRAVENOUS at 11:39

## 2025-07-21 RX ADMIN — CLONIDINE HYDROCHLORIDE 0.1 MG: 0.1 TABLET ORAL at 17:46

## 2025-07-21 RX ADMIN — PROPOFOL 10 MG: 10 INJECTION, EMULSION INTRAVENOUS at 11:45

## 2025-07-21 RX ADMIN — PIPERACILLIN SODIUM AND TAZOBACTAM SODIUM 2.25 G: 2; .25 INJECTION, SOLUTION INTRAVENOUS at 02:42

## 2025-07-21 RX ADMIN — LEVETIRACETAM 750 MG: 500 TABLET, FILM COATED ORAL at 21:29

## 2025-07-21 RX ADMIN — CLONIDINE HYDROCHLORIDE 0.1 MG: 0.1 TABLET ORAL at 09:11

## 2025-07-21 RX ADMIN — BENZOCAINE 2 SPRAY: 200 SPRAY DENTAL; ORAL; PERIODONTAL at 11:30

## 2025-07-21 RX ADMIN — MICAFUNGIN SODIUM 100 MG: 100 INJECTION, POWDER, LYOPHILIZED, FOR SOLUTION INTRAVENOUS at 22:58

## 2025-07-21 RX ADMIN — PREGABALIN 75 MG: 75 CAPSULE ORAL at 09:11

## 2025-07-21 RX ADMIN — LIDOCAINE HYDROCHLORIDE 1.25 ML: 20 SOLUTION ORAL; TOPICAL at 11:35

## 2025-07-21 RX ADMIN — PHENYLEPHRINE HYDROCHLORIDE 80 MCG: 10 INJECTION INTRAVENOUS at 11:58

## 2025-07-21 RX ADMIN — PROPOFOL 20 MG: 10 INJECTION, EMULSION INTRAVENOUS at 11:40

## 2025-07-21 RX ADMIN — PROPOFOL 20 MG: 10 INJECTION, EMULSION INTRAVENOUS at 11:41

## 2025-07-21 RX ADMIN — PHENYLEPHRINE HYDROCHLORIDE 80 MCG: 10 INJECTION INTRAVENOUS at 12:04

## 2025-07-21 RX ADMIN — PHENYLEPHRINE HYDROCHLORIDE 80 MCG: 10 INJECTION INTRAVENOUS at 12:10

## 2025-07-21 RX ADMIN — PIPERACILLIN SODIUM AND TAZOBACTAM SODIUM 2.25 G: 2; .25 INJECTION, SOLUTION INTRAVENOUS at 17:47

## 2025-07-21 ASSESSMENT — PAIN SCALES - GENERAL
PAINLEVEL_OUTOF10: 0 - NO PAIN
PAINLEVEL_OUTOF10: 7
PAINLEVEL_OUTOF10: 0 - NO PAIN
PAINLEVEL_OUTOF10: 0 - NO PAIN

## 2025-07-21 ASSESSMENT — COGNITIVE AND FUNCTIONAL STATUS - GENERAL
CLIMB 3 TO 5 STEPS WITH RAILING: A LITTLE
MOBILITY SCORE: 20
DAILY ACTIVITIY SCORE: 24
MOVING TO AND FROM BED TO CHAIR: A LITTLE
WALKING IN HOSPITAL ROOM: A LITTLE
STANDING UP FROM CHAIR USING ARMS: A LITTLE

## 2025-07-21 NOTE — CARE PLAN
The patient's goals for the shift include      The clinical goals for the shift include pt will remain safe and stable during shift      During shift, pt remain safe and stable. Pt c/o pain during shift, but refused all PRN and most schedule medications. Dr. Bravo made aware. Pt had a LAURA done, awaiting results. Pt went to received dialysis and per dialysis nurse,pt refused. Repeat lab drawn completed, awaiting results. No significant event during shift.    Hansel Saldana, RN

## 2025-07-21 NOTE — NURSING NOTE
Report to Receiving RN:    Report To:   lorraine  Time Report Called: 1700  Hand-Off Communication: Pt refused further tx after 40 min as she could not get Benadryl; AMA for dialysis signed all doctors aware  Complications During Treatment: No  Ultrafiltration Treatment: No  Medications Administered During Dialysis: No  Blood Products Administered During Dialysis: No  Labs Sent During Dialysis: Yes  Heparin Drip Rate Changes: N/A  Dialysis Catheter Dressing: intact  Last Dressing Change:     Last Updated: 4:46 PM by KATLYN BELLA

## 2025-07-21 NOTE — PROGRESS NOTES
Assessment/Plan   Batsheva Page is a 49 y.o. 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 Pt had HD line replaced with temporary line as was thouhgt to be source. Her cultures are positive for candida galbrata and staph epi. She later had a fever at osh and more recent cultures growing proteus. Her current culutres are still positive for yeast and GNRs. Her imaging of her lungs is concerning for pna and loculated effusions of unclear sterility. She had echo at osh that did not show abnormal signs on her prothetic valves.   ID consulted, continue cefepime and micafungin. Planning thoracenteiss to be ordered for Monday. At OSH she had complaints of pain but severe somnolence and concerns for polypharm. On presented she was almost obtunded required sternal rub to awaken on multiple occasions, sedating medications weaned off and monitoring cognitive changes. Sara agrees concerns for polypharm, pt appears to have issues with chemical coping, asking for larger doses or they will not work for her she says. At this time still evaluating encephalopathy, she has been non-adherant to her seizure meds, pending EEG. There is possible contribution from her infecton. Mentation has show more stable improvement but pt has declined care (HD, medication) while attempting to negotiate to get IV dialudid and benadryl after extensive counselling from primary and nursing. . Pt has been more alert but still sleeps most of the day. EEG with signs of moderate encephalopathy. Optimizing non-sedating pain medications, pt remains febrile. Cultures remain positive. Pt is ordered a LAURA and thoracentesis. NPO at midnight in  case of need for  sedation tomorrow.     Proteus, staph epi bacteremia  Maida galbrata fungemia  PNA with loculated effusion  H/o bacteremia, graft infection, endocarditis, prosthetic valve x3  - suspecting line v. Effusions as source, possible valves given replacements but pt had normal echo.  - no current need for optho exam unless develops symtpoms per discussion.   Repeat blood cultures for clearance, will order for 7/20. So far no further staph epi growth. No further candida growth since negative fungal culture 7/18. No current growth to date on culture 7/20, monitoring clearance of proteus.   - will order thoracentesis and LAURA, pt is aware. Discussed the sedation requirements for each. She is reqeusting general anesthesia for her thoracentesis, explained to patient about how its done that its done with local anesthetic. Unclear if pt will proceed with light sedation. Given mentation, plan to avoid sedation except as needed for interventions. Pt was in agreement with temporary code reversal to do procedure during conversations about the testing.    - pt may need new line v. Abx lock therapy. Per outside record, IR left sheath in place to assist in replacement.   - continue micafungin and cefepime, appreciate ID recs.   -pt remains febrile. Cultures remain positive. Pt is ordered a LAURA and thoracentesis. NPO at midnight in  case of need for sedation     ESRD (end stage renal disease) (Multi)  Hyperparathyroidism  - Hd per nephro  - continue management of hyperparathyroidism per nephro  - R fem line replaced, left sheath in place per record, will need new line once source control achieve  - pt counselled about her declining hd, possible need for a line holiday if unable to clear cultures, need to maintain RRP, advised that she will not be negoting pain medication or benadryl IV for care espicially when there are known safety risks. Today pt was agreeable to proceed with HD.   - Pt likely to need line replaced again.   - at osh  she was reporting looking into PD, she will talk about that op, unclear how that will affect her infection risk.     AMS  - suspect ams 2/2 polypharm in setting of esrd. Has h/o opioid dependence. Appears to have chemical coping. Unclear indicaiton of opioids for Restless leg syndrome as well. There were concerns with family about her opioid use in the hospital and level of sedation, pt continuing to ask for these medications and benadryl despite not able to be worken up. Prior to dc had meds changed from iv to oral. She requiried sternal rub upon presentation to awaken, could not maintain her attention/alertness. After holding meds this has improved, yet pt despite counselling still requesting and declining care for medication. Had conversation with patient and family, family reports pt will act up at times and to involve family to help to talk to patient if needed  - overall improved. Will avoid opioids and benadryl for now, still not at baseline, also with poor insight. She has asked to dc home on 2 occasions despite discussion of seriousness of her condition.   - she does not have capacity to sign ama at this time.   - eeg ordered, pt has not been adherant to her AEDs  - could be contributed by infection as above.   -EEG with signs of moderate encephalopathy. Continue to avoid sedating medicaitons. Pt is on lyrica as well as methocarbamol. Is now reporting pain again. Made methocarbamol prn, monitor for sedation. Stop methocarbamol.   - Pt conitnues to have sleepiness but is more alert. She continues to report poor memory of our conversation but she was able to explain the testing we discussed. The memory tends to surround our discussions over sedating meds. Pt also reported not seeing provider on several occasions. Addressed with nursing, involving floor liaison as family also expressing concerns about not treating. Have reviewed risks and discussed at this time it is imperitive that work on cognitive recovery,  avoiding anything that will contribute to delirum, has not show any stability to resume medicaitons with sedating properties at this time. Nephrology in agreement pt does not need benadryl to complete her hd treatment. She completed on Saturday without it (although it was requested by her and declined by nephrology).  Chemical dependency consult was discussed with patient. She is in agreement with consultation, discussed with service.     HTN with current hypotensino  - pt is mostly refusing medications at this time  - her carvedilol, amlodpine are on hold  - clonidine reduced to 0.1mg tid    H/o Dvt  No longer on ac    Thrombocytopenia,  - mild intermittant low plts since at least 2019 noted in record. Currenlty noted on x 3 days,  Currently has not downtrended further. Suspected related to infection. If further drops may need further work up.     Scheduled outpatient appointments in system:   No future appointments.    ---------------------------------------------------------------------------------------------------  Subjective   No events, pt declining dialysis this am again without benadryl. Counseled by provider then nephrology. Pt is still delirious, still reports to nursing issues that have been addressed, not seeing her provider after being seen for a visit. She is sitting comfortably in bed, no signs of pain. She continues to report pain and need for benadryl to relax for her hd session. She is complaining about having to go through too much, that she is tired of the interventions but she would not want to change to just comfort care. Had disucsison with family, updated on plan for testing/additional work up, issues with her delirium, family expressing need to provie medications to patient, address safety issues and continued to recommend against as previously discussing. Patient and family mentioning she has been on it for 20 years, I have explained that it can be reevalauted for restart when it is safe  but this is a different clinical context than past use. She explained also asked why she was able to get all the medications at the other hospital. Previously the daughter had expressed her concern about this situation during our initial meeting, again explained the safety concerns and current status and recommendations. Family said they would be in this afternoon.   ---------------------------------------------------------------------------------------------------  Objective   Last Recorded Vitals  Blood pressure 146/86, pulse 76, temperature 36.3 °C (97.3 °F), temperature source Temporal, resp. rate 19, weight 64.8 kg (142 lb 13.7 oz), SpO2 99%.  Intake/Output last 3 Shifts:  No intake/output data recorded.    Physical Exam  Vitals and nursing note reviewed.   Constitutional:       General: She is not in acute distress.     Appearance: Normal appearance. She is not ill-appearing or toxic-appearing.      Comments: Appear sleepy, but maintains alertness and attention, maintains appropriate conversatin   HENT:      Head: Normocephalic and atraumatic.      Mouth/Throat:      Mouth: Mucous membranes are moist.     Eyes:      General: No scleral icterus.     Extraocular Movements: Extraocular movements intact.      Conjunctiva/sclera: Conjunctivae normal.     Neck:      Comments: No jvd  Cardiovascular:      Rate and Rhythm: Normal rate and regular rhythm.      Heart sounds: S1 normal and S2 normal. Murmur heard.   Pulmonary:      Effort: Pulmonary effort is normal. No respiratory distress.      Breath sounds: No wheezing, rhonchi or rales.   Abdominal:      General: Bowel sounds are normal. There is no distension.      Palpations: Abdomen is soft.      Tenderness: There is no abdominal tenderness. There is no guarding or rebound.     Musculoskeletal:         General: No swelling or deformity.      Cervical back: Neck supple.     Skin:     General: Skin is warm and dry.      Findings: Lesion present. No rash.       Comments: Mulitple lesions to lower extremities, scattered scabs     Neurological:      General: No focal deficit present.      Mental Status: She is alert and oriented to person, place, and time. Mental status is at baseline.      Sensory: Sensory deficit present.      Motor: No weakness.     Psychiatric:         Mood and Affect: Mood normal.      Comments: Impaired insight, poor memory. Pt sitting in bed, sitting still, no grimacing, calm conversation.          Relevant Results  Lab Results   Component Value Date    WBC 8.1 07/20/2025    HGB 8.4 (L) 07/20/2025    HCT 27.4 (L) 07/20/2025    MCV 99 07/20/2025     (L) 07/20/2025      Lab Results   Component Value Date    GLUCOSE 90 07/20/2025    CALCIUM 6.9 (L) 07/20/2025     (L) 07/20/2025    K 4.7 07/20/2025    CO2 22 07/20/2025    CL 92 (L) 07/20/2025    BUN 56 (H) 07/20/2025    CREATININE 10.06 (H) 07/20/2025     Scheduled medications  Scheduled Medications[1]  Continuous medications  Continuous Medications[2]  PRN medications  PRN Medications[3]    John Zavala MD               [1] acetaminophen, 975 mg, oral, q8h  [Held by provider] amLODIPine, 5 mg, oral, Daily  aspirin, 81 mg, oral, Daily  atorvastatin, 40 mg, oral, Nightly  calcitriol, 0.5 mcg, oral, Daily  [Held by provider] carvedilol, 6.25 mg, oral, BID  cloNIDine, 0.1 mg, oral, q8h KIMBERLY  diclofenac sodium, 4 g, Topical, TID  epoetin brandy or biosimilar, 12,000 Units, intravenous, Once per day on Monday Wednesday Friday  ergocalciferol, 1.25 mg, oral, Weekly  levETIRAcetam, 750 mg, oral, Nightly  lidocaine, , Topical, TID  micafungin, 100 mg, intravenous, q24h  pantoprazole, 40 mg, oral, Daily before breakfast  patiromer calcium sorbitex, 8.4 g, oral, Daily  piperacillin-tazobactam, 2.25 g, intravenous, q8h  pregabalin, 75 mg, oral, Daily  sodium chloride, 500 mL, intravenous, Once     [2]    [3] PRN medications: dextrose, dextrose, [Held by provider] diphenhydrAMINE,  diphenhydramine-zinc acetate, glucagon, glucagon, hydrALAZINE, ipratropium-albuteroL, [Held by provider] methocarbamol, naloxone, [Held by provider] oxyCODONE

## 2025-07-21 NOTE — CARE PLAN
The patient's goals for the shift include      The clinical goals for the shift include pt will remain hds    Over the shift, the patient did not make progress toward the following goals. Barriers to progression include . Recommendations to address these barriers include .

## 2025-07-21 NOTE — CARE PLAN
Transitional Care Coordinator Note: Patient discussed with medical team, per medical team patient is not medically ready. Patient will need a thoracentesis, ramon, get cultures cleared, see if she needs allison or abx line lock therapy.   Discharge dispo: May Need HC for IV ATB.  ADOD 2-3 Days.  José Miguel Nunes RN  Transitional Care Coordinator

## 2025-07-21 NOTE — INTERVAL H&P NOTE
H&P reviewed. The patient was examined and there are no changes to the H&P.    Discussed with patient, and she is agreeable to reversing DNR code status for 24 hours for the procedure.

## 2025-07-21 NOTE — NURSING NOTE
Transport arrived to take patient to dialysis, she refused. She stated she can not complete dialysis without benadryl 50 mg IV prior. Team notified and dialysis updated.

## 2025-07-21 NOTE — SIGNIFICANT EVENT
Attempted to see patient in the afternoon, but she was not in her room. Discussed with RN (Jennifer Carreno), who reported pt was at vascular for US, then scheduled for HD. Pt starting HD late afternoon per nurse. Will plan to see patient tomorrow.

## 2025-07-21 NOTE — ANESTHESIA PREPROCEDURE EVALUATION
Patient: Batsheva Page    Procedure Information       Date/Time: 07/21/25 1100    Scheduled providers: Zachery Mcallister MD; ORI Moore-TINO    Procedure: TRANSESOPHAGEAL ECHO (LAURA)    Location: [facility]        49 y.o. 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 Pt had HD line replaced with temporary line as was thouhgt to be source. Her cultures are positive for candida galbrata and staph epi. She later had a fever at osh and more recent cultures growing proteus. Her current culutres are still positive for yeast and GNRs.        Relevant Problems   Anesthesia (within normal limits)      Cardiac   (+) Arteriosclerosis of coronary artery bypass graft   (+) Benign essential hypertension   (+) Murmur, cardiac (S/p MVR/AVR surgery)   (+) Paroxysmal atrial fibrillation (Multi)   (+) Primary hypertension      Pulmonary  Longtime smoker on chart -- pt denies   (-) Chronic obstructive pulmonary disease (Multi) (Pt denies)      Neuro   (+) Anxiety   (+) CVA (cerebral vascular accident) (Multi) (Pt states had CVA ~2015 -- no residual deficits)   (+) Depression with anxiety   (+) Major depressive disorder, recurrent, severe with psychotic symptoms (Multi)   (+) Opioid use disorder   (+) Seizure (Multi) (Pt states last seizure ~2023, no problems since)      GI (within normal limits)   (-) Gastroesophageal reflux disease (Pt denies)      /Renal   (+) ESRD (end stage renal disease) (Multi)   (+) End-stage renal disease on hemodialysis (Multi) (Last dialysis Saturday 7/19/25)   (+) Hyponatremia      Liver (within normal limits)  Pt denies alcohol use      Endocrine   (+) Hyperparathyroidism  (Multi)      Hematology   (+) Anemia of chronic disease   (+) Anemia secondary to renal failure   (+) Iron deficiency anemia   (-) History of blood transfusion (Pt denies)      Musculoskeletal (within normal limits)      HEENT (within normal limits)      ID  Hx endocarditis   (+) Bacteremia   (+) MRSA (methicillin resistant Staphylococcus aureus) infection   (+) MRSA bacteremia   (+) Right foot infection   (+) Septic shock (Multi)      Skin   (+) Rash (Multiple skin blemishes throughout)      GYN (within normal limits)       Past Medical History  She has a past medical history of Aortic valve replaced (2022), CHF (congestive heart failure), Chronic pain, Coronary artery disease, Disease of thyroid gland, ESRD (end stage renal disease) (Multi), H/O mitral valve replacement (2013), Heart disease, History of transfusion, Hypertension, Mitral valve regurgitation, Seizures (Multi), and Stroke (Multi).       Surgical History  She has a past surgical history that includes IR CVC tunneled (09/09/2022); IR CVC tunneled (12/28/2022); US guided percutaneous placement (07/14/2022); Parathyroidectomy; Coronary artery bypass graft; Aortic valve replacement (09/26/2022); Mitral valve replacement (09/26/2022); Mitral valve repair (2013); Tricuspid valvuloplasty (2013); IR CVC (01/12/2024); IR CVC (05/07/2024); and IR CVC (08/20/2024).      Clinical information reviewed:    Allergies  Meds               NPO Detail:  No data recorded     Physical Exam    Airway  Mallampati: II  TM distance: >3 FB  Neck ROM: full  Mouth opening: 3 or more finger widths     Cardiovascular   Rhythm: irregular  Rate: abnormal     Dental     (+) implants     Pulmonary    Abdominal Abdomen: soft  Bowel sounds: normal     Other findings: Pt with upper and lower dentures that she states are screwed in place and do not come out        Anesthesia Plan    History of general anesthesia?: yes  History of complications of general anesthesia?: no    ASA 3     MAC    (Plan sedation, primarily with propofol)  Current smoker: Pt denies.    intravenous induction   Anesthetic plan and risks discussed with patient.  Use of blood products discussed with patient who consented to blood products.    Plan discussed with CRNA and attending.

## 2025-07-21 NOTE — PROGRESS NOTES
Discussed chemical dependency consult with patient, she is in agreement with proceeding with consult request

## 2025-07-21 NOTE — CONSULTS
Batsheva Page   49 y.o.      Vitals:    07/21/25 0600   Weight: 64.8 kg (142 lb 13.7 oz)      MRN/Room: [id number]    Reason for consult: ESRD on dialysis  Requesting physician: Dr. Zavala    History Of Present Illness  Batsheva Page is a 49 y.o. 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 Pt had HD line replaced with temporary line as was thouhgt to be source. Her cultures are positive for candida galbrata and staph epi. She later had a fever at osh and more recent cultures growing proteus. Her current culutres are still positive for yeast and GNRs.    Past Medical History  She has a past medical history of Aortic valve replaced (2022), CHF (congestive heart failure), Chronic pain, Coronary artery disease, Disease of thyroid gland, ESRD (end stage renal disease) (Multi), H/O mitral valve replacement (2013), Heart disease, History of transfusion, Hypertension, Mitral valve regurgitation, Seizures (Multi), and Stroke (Multi).    Surgical History  She has a past surgical history that includes IR CVC tunneled (09/09/2022); IR CVC tunneled (12/28/2022); US guided percutaneous placement (07/14/2022); Parathyroidectomy; Coronary artery bypass graft; Aortic valve replacement (09/26/2022); Mitral valve replacement (09/26/2022); Mitral valve repair (2013); Tricuspid valvuloplasty (2013); IR CVC (01/12/2024); IR CVC (05/07/2024); and IR CVC (08/20/2024).     Social History  She reports that she has never smoked. She has never used smokeless tobacco. She reports that she does not drink alcohol and does not use drugs.    Family History  Family History[1]     Allergies  Compazine [prochlorperazine],  Kayexalate, Reglan [metoclopramide hcl], and Zofran [ondansetron hcl]        Meds:   acetaminophen, 975 mg, q8h  [Held by provider] amLODIPine, 5 mg, Daily  aspirin, 81 mg, Daily  atorvastatin, 40 mg, Nightly  calcitriol, 0.5 mcg, Daily  [Held by provider] carvedilol, 6.25 mg, BID  cloNIDine, 0.1 mg, q8h KIMBERLY  diclofenac sodium, 4 g, TID  ergocalciferol, 1.25 mg, Weekly  levETIRAcetam, 750 mg, Nightly  lidocaine, , TID  micafungin, 100 mg, q24h  pantoprazole, 40 mg, Daily before breakfast  patiromer calcium sorbitex, 8.4 g, Daily  piperacillin-tazobactam, 2.25 g, q8h  pregabalin, 75 mg, Daily  sodium chloride, 500 mL, Once         dextrose, 12.5 g, q15 min PRN  dextrose, 25 g, q15 min PRN  [Held by provider] diphenhydrAMINE, 25 mg, q6h PRN  diphenhydramine-zinc acetate, , TID PRN  glucagon, 1 mg, q15 min PRN  glucagon, 1 mg, q15 min PRN  hydrALAZINE, 25 mg, TID PRN  ipratropium-albuteroL, 3 mL, q6h PRN  [Held by provider] methocarbamol, 500 mg, q6h PRN  naloxone, 0.4 mg, q5 min PRN  [Held by provider] oxyCODONE, 5 mg, q6h PRN      @medscheduled@  PRN Medications[2]  Prior to Admission Medications   Prescriptions Last Dose Informant Patient Reported? Taking?   HYDROmorphone (Dilaudid) 2 mg tablet Not Taking Other No No   Sig: Take 1 tablet (2 mg) by mouth every 6 hours if needed for severe pain (7 - 10).   Patient not taking: Reported on 7/18/2025   acetaminophen (Tylenol) 325 mg tablet Not Taking Spouse/Significant Other No No   Sig: Take 2 tablets (650 mg) by mouth every 6 hours as needed for mild pain (1 - 3).   Patient not taking: Reported on 7/18/2025   amLODIPine (Norvasc) 5 mg tablet Not Taking Other No No   Sig: Take 1 tablet (5 mg) by mouth once daily.   Patient not taking: Reported on 7/18/2025   aspirin 81 mg EC tablet Not Taking Other No No   Sig: Take 1 tablet (81 mg) by mouth once daily.   Patient not taking: Reported on 7/18/2025   carvedilol (Coreg) 12.5 mg tablet  Other No No   Sig: Take 1 tablet  (12.5 mg) by mouth 2 times a day.   cloNIDine (Catapres) 0.3 mg tablet  Other, Spouse/Significant Other No No   Sig: Take 1 tablet (0.3 mg) by mouth every 8 hours.   diphenhydramine HCl (BENADRYL ORAL)  Other, Spouse/Significant Other Yes No   Sig: Take 1 tablet by mouth once daily as needed. Take on dialysis days   epoetin brandy-epbx (RETACRIT) 20,000 unit/mL solution Not Taking Other No No   Sig: Inject 6,440 Units under the skin 3 times a week. Do not start before 2024.   Patient not taking: Reported on 2025   levETIRAcetam (Keppra) 500 mg tablet  Other, Spouse/Significant Other Yes No   Sig: Take 1.5 tablets (750 mg) by mouth once daily at bedtime.   lidocaine 4 % patch  Other No No   Sig: Place 1 patch over 12 hours on the skin once daily. Remove & discard patch within 12 hours or as directed by MD.   methocarbamol (Robaxin) 500 mg tablet  Other No No   Sig: Take 1 tablet (500 mg) by mouth every 8 hours.   micafungin (Mycamine) IV Not Taking Other No No   Sig: Infuse 100 mL (100 mg) into a venous catheter once daily for 9 days.   Patient not taking: Reported on 2025   oxyCODONE-acetaminophen (Percocet) 5-325 mg tablet  Other, Spouse/Significant Other Yes Yes   Sig: Take 1 tablet by mouth every 4 hours.   pregabalin (Lyrica) 75 mg capsule  Other No No   Sig: Take 1 capsule (75 mg) by mouth once daily.   Patient taking differently: Take 1 capsule (75 mg) by mouth 2 times a day.   sevelamer carbonate (Renvela) 800 mg tablet  Other No No   Sig: Take 1 tablet (800 mg) by mouth 3 times daily (morning, midday, late afternoon). Swallow tablet whole; do not crush, break, or chew.   Patient not taking: Reported on 2025   vancomycin 5 mg/mL in sodium chloride 0.9% solution Not Taking Other No No   Si mL by intra-catheter route 3 (three) times a week.   Patient not taking: Reported on 2025      Facility-Administered Medications: None     Current Medications[3]      ROS:  The patient is  awake and oriented. No dizziness or lightheadedness. No chills and no fever. No headaches. No nausea and no vomiting. No shortness of breath. No cough. No chest pain. No abdominal pain. No diarrhea. No hematemesis or hemoptysis. No hematuria. No rectal bleeding. No melena. No epistaxis. No urinary symptoms. No flank pain. No leg edema. No leg pain. No itching. Overall, the rest of the review of systems is also negative.  12 point review of systems otherwise negative as stated in HPI.        Physical Exam:        Vitals:    07/21/25 0632   BP: 154/87   Pulse: 74   Resp: 18   Temp: 36.3 °C (97.3 °F)   SpO2: 98%     General: The patient is awake, oriented, and is not in any distress.  Head and Neck: Normocephalic. No periorbital edema.  Eyes: Not icteric.   Respiratory: Symmetric chest expansion. No respiratory distress.  Skin: No maculopapular rash.  Musculoskeletal: No peripheral edema.  Neuro Exam: Speech is fluent. Moves extremities.        Blood Labs:  Results for orders placed or performed during the hospital encounter of 07/17/25 (from the past 24 hours)   POCT GLUCOSE   Result Value Ref Range    POCT Glucose 96 74 - 99 mg/dL   Blood Culture    Specimen: Peripheral Venipuncture; Blood culture   Result Value Ref Range    Blood Culture Loaded on Instrument - Culture in progress    Blood Culture    Specimen: Peripheral Venipuncture; Blood culture   Result Value Ref Range    Blood Culture Loaded on Instrument - Culture in progress    POCT GLUCOSE   Result Value Ref Range    POCT Glucose 142 (H) 74 - 99 mg/dL   POCT GLUCOSE   Result Value Ref Range    POCT Glucose 127 (H) 74 - 99 mg/dL   POCT GLUCOSE   Result Value Ref Range    POCT Glucose 116 (H) 74 - 99 mg/dL      Lab Results   Component Value Date    GLUCOSE 90 07/20/2025    CALCIUM 6.9 (L) 07/20/2025     (L) 07/20/2025    K 4.7 07/20/2025    CO2 22 07/20/2025    CL 92 (L) 07/20/2025    BUN 56 (H) 07/20/2025    CREATININE 10.06 (H) 07/20/2025        Imaging:  === 12/23/22 ===    US RIGHT UPPER QUADRANT    - Impression -  Atrophic right kidney. Otherwise, unremarkable right upper quadrant  ultrasound.      I personally reviewed the images/study and I agree with the resident  findings as stated. This study was interpreted at [facility], [place], [place].      Assessment and Plan:  49-year-old female with multiple medical problems including end-stage renal disease on hemodialysis through a right femoral catheter.  She has history of multiple infections.  She gets her dialysis every Tuesday, Thursday, and Saturday.  She was dialyzed Saturday and we will continue her dialysis as per her regular schedule.        Freddy Coleman MD  Senior Attending Physician  Director of Onco-Nephrology Program  Division of Nephrology & Hypertension  [facility]        [1]   Family History  Problem Relation Name Age of Onset    No Known Problems Mother      No Known Problems Father     [2] PRN medications: dextrose, dextrose, [Held by provider] diphenhydrAMINE, diphenhydramine-zinc acetate, glucagon, glucagon, hydrALAZINE, ipratropium-albuteroL, [Held by provider] methocarbamol, naloxone, [Held by provider] oxyCODONE  [3]   Current Facility-Administered Medications   Medication Dose Route Frequency Provider Last Rate Last Admin    acetaminophen (Tylenol) tablet 975 mg  975 mg oral q8h John Zavala MD   975 mg at 07/20/25 1506    [Held by provider] amLODIPine (Norvasc) tablet 5 mg  5 mg oral Daily Jessica Rodriguezs, DO        aspirin chewable tablet 81 mg  81 mg oral Daily Jessica Jim, DO   81 mg at 07/19/25 0831    atorvastatin (Lipitor) tablet 40 mg  40 mg oral Nightly Jessica Rodriguezs, DO   40 mg at 07/20/25 2152    calcitriol (Rocaltrol) capsule 0.5 mcg  0.5 mcg oral Daily Jessica Jim, DO   0.5 mcg at 07/19/25 0831    [Held by provider] carvedilol (Coreg) tablet 6.25 mg  6.25 mg oral  BID John Zavala MD   6.25 mg at 07/19/25 0831    cloNIDine (Catapres) tablet 0.1 mg  0.1 mg oral q8h KIMBERLY John Zavala MD   0.1 mg at 07/21/25 0911    dextrose 50 % injection 12.5 g  12.5 g intravenous q15 min PRN Jessica Jim DO        dextrose 50 % injection 25 g  25 g intravenous q15 min PRN Jessica Jim DO        diclofenac sodium (Voltaren) 1 % gel 4 g  4 g Topical TID John Zavala MD        [Held by provider] diphenhydrAMINE (BENADryl) capsule 25 mg  25 mg oral q6h PRN Jessica Jim DO        diphenhydramine-zinc acetate cream   Topical TID PRN Jessica Jim DO        ergocalciferol (Vitamin D-2) capsule 1.25 mg  1.25 mg oral Weekly Jessica Jim DO        glucagon (Glucagen) injection 1 mg  1 mg intramuscular q15 min PRN Jessica Jim DO        glucagon (Glucagen) injection 1 mg  1 mg intramuscular q15 min PRN Jessica Jim DO        hydrALAZINE (Apresoline) tablet 25 mg  25 mg oral TID PRN Jessica Jim DO        ipratropium-albuteroL (Duo-Neb) 0.5-2.5 mg/3 mL nebulizer solution 3 mL  3 mL nebulization q6h PRN Jessica Jim DO        levETIRAcetam (Keppra) tablet 750 mg  750 mg oral Nightly Jessica Jim DO   750 mg at 07/20/25 2152    lidocaine (LMX) 4 % cream   Topical TID John Zavala MD   Given at 07/20/25 1810    [Held by provider] methocarbamol (Robaxin) tablet 500 mg  500 mg oral q6h PRN John Zavala MD   500 mg at 07/20/25 2152    micafungin (Mycamine) 100 mg in dextrose 5%  mL  100 mg intravenous q24h Jessica Jim DO   Stopped at 07/21/25 0130    naloxone (Narcan) injection 0.4 mg  0.4 mg intravenous q5 min PRN Jessica Jim DO        [Held by provider] oxyCODONE (Roxicodone) immediate release tablet 5 mg  5 mg oral q6h PRN Jessica Jim DO        pantoprazole (ProtoNix) EC tablet 40 mg  40 mg oral Daily before breakfast Jessica Jim DO   40 mg at 07/20/25 0624     patiromer calcium sorbitex (Veltassa) packet 8.4 g  8.4 g oral Daily Jessica Jim DO        piperacillin-tazobactam (Zosyn) 2.25 g in dextrose (iso) IV 50 mL  2.25 g intravenous q8h Jessica Jim DO   Stopped at 07/21/25 0335    pregabalin (Lyrica) capsule 75 mg  75 mg oral Daily Jessica Jim DO   75 mg at 07/21/25 0911    sodium chloride 0.9 % bolus 500 mL  500 mL intravenous Once John Zavala MD

## 2025-07-21 NOTE — NURSING NOTE
Report from Sending RN:    Report From: Ann (FARAZ)  Recent Surgery of Procedure: No  Baseline Level of Consciousness (LOC): a/o x 4 - drowsey  Oxygen Use: Yes  Type: NC 1 lpm  Diabetic: No  Last BP Med Given Day of Dialysis: See MAR - clonidine 0.1 mg q8h @ 2353; robaxin 500 mg q6h @ 2152  Last Pain Med Given: held  Lab Tests to be Obtained with Dialysis: possible AM labs if not obtained prior to treatment  Blood Transfusion to be Given During Dialysis: No, unlikely as HgB 8.4  Available IV Access: Yes, #20 RFA  Medications to be Administered During Dialysis: No  Continuous IV Infusion Running: No  Restraints on Currently or in the Last 24 Hours: N/A  Hand-Off Communication: Admitted for poss line infection.  No acute issues or concerns that would delay treatment this morning.  Able to stand for wt with assistance.  Travels by HD cart.  VS @ 0643 --> 36.3 - 74 - 18 - 154/87 - 98% on NC 1 lpm  Dialysis Catheter Dressing: Fem HD line  Last Dressing Change: assessment pending upon arrival to unit

## 2025-07-21 NOTE — NURSING NOTE
Patient refused Tylenol, but took all other hs medications. Patient stated she normally take benadryl prior to dialysis. Team updated about patient concern.

## 2025-07-21 NOTE — ANESTHESIA POSTPROCEDURE EVALUATION
Patient: Batsheva Page    Procedure Summary       Date: 07/21/25 Room / Location: [facility]    Anesthesia Start: 1115 Anesthesia Stop: 1228    Procedure: TRANSESOPHAGEAL ECHO (LAUAR) Diagnosis:       Bacteremia      (Eval for endocarditis of prosthetic valves in setting of bacteremia and fungemia)    Scheduled Providers: Zachery Mcallister MD; ORI Moore-CRNA Responsible Provider: Zachery Mcallister MD    Anesthesia Type: MAC ASA Status: 3            Anesthesia Type: MAC    Vitals Value Taken Time   /78 07/21/25 12:17   Temp 36.4 07/21/25 12:28   Pulse 75 07/21/25 12:17   Resp 20 07/21/25 12:17   SpO2 100 % 07/21/25 12:17       Anesthesia Post Evaluation    Patient location during evaluation: bedside  Patient participation: complete - patient participated  Level of consciousness: awake and alert  Pain management: satisfactory to patient  Airway patency: patent  Cardiovascular status: acceptable and hemodynamically stable  Respiratory status: acceptable, room air and spontaneous ventilation  Hydration status: acceptable  Postoperative Nausea and Vomiting: none        There were no known notable events for this encounter.

## 2025-07-22 ENCOUNTER — APPOINTMENT (OUTPATIENT)
Dept: RADIOLOGY | Facility: HOSPITAL | Age: 50
DRG: 871 | End: 2025-07-22
Payer: MEDICARE

## 2025-07-22 LAB
ALBUMIN SERPL BCP-MCNC: 3.4 G/DL (ref 3.4–5)
ANION GAP BLDA CALCULATED.4IONS-SCNC: 15 MMO/L (ref 10–25)
ANION GAP SERPL CALC-SCNC: 26 MMOL/L (ref 10–20)
BACTERIA BLD AEROBE CULT: ABNORMAL
BACTERIA BLD CULT: ABNORMAL
BASE EXCESS BLDA CALC-SCNC: -1.2 MMOL/L (ref -2–3)
BODY TEMPERATURE: 37 DEGREES CELSIUS
BUN SERPL-MCNC: 57 MG/DL (ref 6–23)
CA-I BLDA-SCNC: 0.75 MMOL/L (ref 1.1–1.33)
CALCIUM SERPL-MCNC: 7.4 MG/DL (ref 8.6–10.6)
CHLORIDE BLDA-SCNC: 95 MMOL/L (ref 98–107)
CHLORIDE SERPL-SCNC: 92 MMOL/L (ref 98–107)
CO2 SERPL-SCNC: 22 MMOL/L (ref 21–32)
CREAT SERPL-MCNC: 10.19 MG/DL (ref 0.5–1.05)
EGFRCR SERPLBLD CKD-EPI 2021: 4 ML/MIN/1.73M*2
ERYTHROCYTE [DISTWIDTH] IN BLOOD BY AUTOMATED COUNT: 16.9 % (ref 11.5–14.5)
GLUCOSE BLD MANUAL STRIP-MCNC: 109 MG/DL (ref 74–99)
GLUCOSE BLD MANUAL STRIP-MCNC: 143 MG/DL (ref 74–99)
GLUCOSE BLD MANUAL STRIP-MCNC: 95 MG/DL (ref 74–99)
GLUCOSE BLDA-MCNC: 119 MG/DL (ref 74–99)
GLUCOSE SERPL-MCNC: 89 MG/DL (ref 74–99)
GRAM STN SPEC: ABNORMAL
HCO3 BLDA-SCNC: 22.7 MMOL/L (ref 22–26)
HCT VFR BLD AUTO: 33.8 % (ref 36–46)
HCT VFR BLD EST: 48 % (ref 36–46)
HGB BLD-MCNC: 10.3 G/DL (ref 12–16)
HGB BLDA-MCNC: 16.1 G/DL (ref 12–16)
INHALED O2 CONCENTRATION: 21 %
LACTATE BLDA-SCNC: 1.6 MMOL/L (ref 0.4–2)
MCH RBC QN AUTO: 29.9 PG (ref 26–34)
MCHC RBC AUTO-ENTMCNC: 30.5 G/DL (ref 32–36)
MCV RBC AUTO: 98 FL (ref 80–100)
NRBC BLD-RTO: 0 /100 WBCS (ref 0–0)
OXYHGB MFR BLDA: 89.9 % (ref 94–98)
PCO2 BLDA: 35 MM HG (ref 38–42)
PH BLDA: 7.42 PH (ref 7.38–7.42)
PHOSPHATE SERPL-MCNC: 4.9 MG/DL (ref 2.5–4.9)
PLATELET # BLD AUTO: 253 X10*3/UL (ref 150–450)
PO2 BLDA: 64 MM HG (ref 85–95)
POTASSIUM BLDA-SCNC: 4.2 MMOL/L (ref 3.5–5.3)
POTASSIUM SERPL-SCNC: 4.4 MMOL/L (ref 3.5–5.3)
RBC # BLD AUTO: 3.44 X10*6/UL (ref 4–5.2)
SAO2 % BLDA: 93 % (ref 94–100)
SODIUM BLDA-SCNC: 128 MMOL/L (ref 136–145)
SODIUM SERPL-SCNC: 136 MMOL/L (ref 136–145)
WBC # BLD AUTO: 8.4 X10*3/UL (ref 4.4–11.3)

## 2025-07-22 PROCEDURE — 82947 ASSAY GLUCOSE BLOOD QUANT: CPT

## 2025-07-22 PROCEDURE — 71045 X-RAY EXAM CHEST 1 VIEW: CPT

## 2025-07-22 PROCEDURE — 71045 X-RAY EXAM CHEST 1 VIEW: CPT | Performed by: RADIOLOGY

## 2025-07-22 PROCEDURE — 99222 1ST HOSP IP/OBS MODERATE 55: CPT | Performed by: PSYCHIATRY & NEUROLOGY

## 2025-07-22 PROCEDURE — 99233 SBSQ HOSP IP/OBS HIGH 50: CPT | Performed by: STUDENT IN AN ORGANIZED HEALTH CARE EDUCATION/TRAINING PROGRAM

## 2025-07-22 PROCEDURE — 2500000001 HC RX 250 WO HCPCS SELF ADMINISTERED DRUGS (ALT 637 FOR MEDICARE OP): Performed by: STUDENT IN AN ORGANIZED HEALTH CARE EDUCATION/TRAINING PROGRAM

## 2025-07-22 PROCEDURE — 1200000002 HC GENERAL ROOM WITH TELEMETRY DAILY

## 2025-07-22 PROCEDURE — 99233 SBSQ HOSP IP/OBS HIGH 50: CPT | Performed by: NURSE PRACTITIONER

## 2025-07-22 PROCEDURE — 36415 COLL VENOUS BLD VENIPUNCTURE: CPT | Performed by: STUDENT IN AN ORGANIZED HEALTH CARE EDUCATION/TRAINING PROGRAM

## 2025-07-22 PROCEDURE — 80069 RENAL FUNCTION PANEL: CPT | Performed by: STUDENT IN AN ORGANIZED HEALTH CARE EDUCATION/TRAINING PROGRAM

## 2025-07-22 PROCEDURE — 84132 ASSAY OF SERUM POTASSIUM: CPT | Performed by: STUDENT IN AN ORGANIZED HEALTH CARE EDUCATION/TRAINING PROGRAM

## 2025-07-22 PROCEDURE — 2500000004 HC RX 250 GENERAL PHARMACY W/ HCPCS (ALT 636 FOR OP/ED): Performed by: STUDENT IN AN ORGANIZED HEALTH CARE EDUCATION/TRAINING PROGRAM

## 2025-07-22 PROCEDURE — 85027 COMPLETE CBC AUTOMATED: CPT | Performed by: STUDENT IN AN ORGANIZED HEALTH CARE EDUCATION/TRAINING PROGRAM

## 2025-07-22 RX ORDER — AMLODIPINE BESYLATE 10 MG/1
10 TABLET ORAL DAILY
Status: DISCONTINUED | OUTPATIENT
Start: 2025-07-23 | End: 2025-07-23 | Stop reason: HOSPADM

## 2025-07-22 RX ORDER — B-COMPLEX WITH VITAMIN C
1 TABLET ORAL DAILY
Status: DISCONTINUED | OUTPATIENT
Start: 2025-07-22 | End: 2025-07-23 | Stop reason: HOSPADM

## 2025-07-22 RX ORDER — DIPHENHYDRAMINE HCL 25 MG
25 CAPSULE ORAL EVERY 6 HOURS PRN
Status: DISCONTINUED | OUTPATIENT
Start: 2025-07-22 | End: 2025-07-23 | Stop reason: HOSPADM

## 2025-07-22 RX ORDER — ACETAMINOPHEN 325 MG/1
650 TABLET ORAL EVERY 6 HOURS PRN
Status: DISCONTINUED | OUTPATIENT
Start: 2025-07-22 | End: 2025-07-23 | Stop reason: HOSPADM

## 2025-07-22 RX ADMIN — LEVETIRACETAM 750 MG: 500 TABLET, FILM COATED ORAL at 21:15

## 2025-07-22 RX ADMIN — CLONIDINE HYDROCHLORIDE 0.1 MG: 0.1 TABLET ORAL at 09:58

## 2025-07-22 RX ADMIN — PIPERACILLIN SODIUM AND TAZOBACTAM SODIUM 2.25 G: 2; .25 INJECTION, SOLUTION INTRAVENOUS at 01:18

## 2025-07-22 RX ADMIN — CARVEDILOL 6.25 MG: 6.25 TABLET, FILM COATED ORAL at 21:15

## 2025-07-22 RX ADMIN — CLONIDINE HYDROCHLORIDE 0.1 MG: 0.1 TABLET ORAL at 00:35

## 2025-07-22 RX ADMIN — PIPERACILLIN SODIUM AND TAZOBACTAM SODIUM 2.25 G: 2; .25 INJECTION, SOLUTION INTRAVENOUS at 09:57

## 2025-07-22 RX ADMIN — Medication 1 TABLET: at 12:48

## 2025-07-22 RX ADMIN — MICAFUNGIN SODIUM 100 MG: 100 INJECTION, POWDER, LYOPHILIZED, FOR SOLUTION INTRAVENOUS at 23:31

## 2025-07-22 RX ADMIN — PREGABALIN 75 MG: 75 CAPSULE ORAL at 09:58

## 2025-07-22 RX ADMIN — ATORVASTATIN CALCIUM 40 MG: 40 TABLET, FILM COATED ORAL at 21:15

## 2025-07-22 RX ADMIN — PIPERACILLIN SODIUM AND TAZOBACTAM SODIUM 2.25 G: 2; .25 INJECTION, SOLUTION INTRAVENOUS at 18:08

## 2025-07-22 ASSESSMENT — COGNITIVE AND FUNCTIONAL STATUS - GENERAL
STANDING UP FROM CHAIR USING ARMS: A LITTLE
CLIMB 3 TO 5 STEPS WITH RAILING: A LITTLE
MOVING TO AND FROM BED TO CHAIR: A LITTLE
WALKING IN HOSPITAL ROOM: A LITTLE
MOBILITY SCORE: 20
DAILY ACTIVITIY SCORE: 24

## 2025-07-22 ASSESSMENT — PAIN SCALES - GENERAL: PAINLEVEL_OUTOF10: 10 - WORST POSSIBLE PAIN

## 2025-07-22 ASSESSMENT — PAIN - FUNCTIONAL ASSESSMENT: PAIN_FUNCTIONAL_ASSESSMENT: 0-10

## 2025-07-22 NOTE — CARE PLAN
The patient's goals for the shift include      The clinical goals for the shift include Pt will remain safe and stable during shift.

## 2025-07-22 NOTE — CARE PLAN
Problem: Pain - Adult  Goal: Verbalizes/displays adequate comfort level or baseline comfort level  Outcome: Progressing  Flowsheets (Taken 7/21/2025 2321)  Verbalizes/displays adequate comfort level or baseline comfort level:   Assess pain using appropriate pain scale   Implement non-pharmacological measures as appropriate and evaluate response     Problem: Safety - Adult  Goal: Free from fall injury  Outcome: Progressing     Problem: Nutrition  Goal: Nutrient intake appropriate for maintaining nutritional needs  Outcome: Progressing     Problem: Skin  Goal: Participates in plan/prevention/treatment measures  Outcome: Progressing  Flowsheets (Taken 7/21/2025 2321)  Participates in plan/prevention/treatment measures: Elevate heels  Goal: Prevent/manage excess moisture  Outcome: Progressing  Flowsheets (Taken 7/21/2025 2321)  Prevent/manage excess moisture:   Moisturize dry skin   Monitor for/manage infection if present  Goal: Prevent/minimize sheer/friction injuries  Outcome: Progressing  Flowsheets (Taken 7/21/2025 2321)  Prevent/minimize sheer/friction injuries:   HOB 30 degrees or less   Turn/reposition every 2 hours/use positioning/transfer devices  Goal: Promote/optimize nutrition  Outcome: Progressing  Flowsheets (Taken 7/21/2025 2321)  Promote/optimize nutrition: Monitor/record intake including meals   The patient's goals for the shift include      The clinical goals for the shift include Patient will remain HDS and safe free from fall/injury throughout the shift.

## 2025-07-22 NOTE — PROGRESS NOTES
Batsheva Page is a 49 y.o. female on day 5 of admission presenting with Bacteremia.    Subjective   Pt resting in bed. Denies sob, n/v/d, fever, cough, chills, pain, chest pains, light head, dizziness, constipation        Objective     Physical Exam  Vitals and nursing note reviewed.   Constitutional:       Appearance: She is ill-appearing.     Cardiovascular:      Rate and Rhythm: Normal rate and regular rhythm.      Comments: SR 80  Pulmonary:      Comments: Joon lung sounds dim  Pox 91% room air  Abdominal:      General: There is distension.      Comments: Non-tender to palpation   Genitourinary:     Comments: States anuric    Musculoskeletal:      Comments: Ble without edema  Rt upper arm edema     Skin:     General: Skin is warm and dry.     Neurological:      General: No focal deficit present.      Mental Status: She is alert and oriented to person, place, and time.     Psychiatric:      Comments: Pt states that she will refuse dialysis tx's because she cannot get IV benadryl during tx. The dr's have explained the risks-vs-benefits and she verbalized that she will refuse tx and it has been noted in psyche consult note. Pt has also asked to speak with someone from palliative care         Last Recorded Vitals  Blood pressure 154/89, pulse 80, temperature 35.4 °C (95.7 °F), temperature source Temporal, resp. rate 18, weight 66.7 kg (147 lb 0.8 oz), SpO2 91%.  Intake/Output last 3 Shifts:  I/O last 3 completed shifts:  In: 1474.6 (22.1 mL/kg) [P.O.:500; I.V.:424.6 (6.4 mL/kg); Other:400; IV Piggyback:150]  Out: 373 (5.6 mL/kg) [Other:373]  Weight: 66.7 kg     Relevant Results    Scheduled medications  acetaminophen, 975 mg, oral, q8h  amLODIPine, 5 mg, oral, Daily  aspirin, 81 mg, oral, Daily  atorvastatin, 40 mg, oral, Nightly  B complex-vitamin C, 1 tablet, oral, Daily  calcitriol, 0.5 mcg, oral, Daily  carvedilol, 6.25 mg, oral, BID  cloNIDine, 0.1 mg, oral, q8h KIMBERLY  diclofenac sodium, 4 g, Topical,  TID  epoetin brandy or biosimilar, 12,000 Units, intravenous, Once per day on Monday Wednesday Friday  ergocalciferol, 1.25 mg, oral, Weekly  levETIRAcetam, 750 mg, oral, Nightly  lidocaine, , Topical, TID  micafungin, 100 mg, intravenous, q24h  pantoprazole, 40 mg, oral, Daily before breakfast  patiromer calcium sorbitex, 8.4 g, oral, Daily  piperacillin-tazobactam, 2.25 g, intravenous, q8h  pregabalin, 75 mg, oral, Daily  sodium chloride, 500 mL, intravenous, Once    Continuous medications     PRN medications  PRN medications: dextrose, dextrose, diphenhydrAMINE, diphenhydramine-zinc acetate, glucagon, glucagon, hydrALAZINE, ipratropium-albuteroL, [Held by provider] methocarbamol, naloxone, [Held by provider] oxyCODONE      This patient has a central line   Reason for the central line remaining today? Dialysis/Hemapheresis    Results for orders placed or performed during the hospital encounter of 07/17/25 (from the past 24 hours)   POCT GLUCOSE   Result Value Ref Range    POCT Glucose 104 (H) 74 - 99 mg/dL   Potassium   Result Value Ref Range    Potassium 4.4 3.5 - 5.3 mmol/L   POCT GLUCOSE   Result Value Ref Range    POCT Glucose 497 (H) 74 - 99 mg/dL   POCT GLUCOSE   Result Value Ref Range    POCT Glucose 140 (H) 74 - 99 mg/dL   CBC   Result Value Ref Range    WBC 8.4 4.4 - 11.3 x10*3/uL    nRBC 0.0 0.0 - 0.0 /100 WBCs    RBC 3.44 (L) 4.00 - 5.20 x10*6/uL    Hemoglobin 10.3 (L) 12.0 - 16.0 g/dL    Hematocrit 33.8 (L) 36.0 - 46.0 %    MCV 98 80 - 100 fL    MCH 29.9 26.0 - 34.0 pg    MCHC 30.5 (L) 32.0 - 36.0 g/dL    RDW 16.9 (H) 11.5 - 14.5 %    Platelets 253 150 - 450 x10*3/uL   Renal Function Panel   Result Value Ref Range    Glucose 89 74 - 99 mg/dL    Sodium 136 136 - 145 mmol/L    Potassium 4.4 3.5 - 5.3 mmol/L    Chloride 92 (L) 98 - 107 mmol/L    Bicarbonate 22 21 - 32 mmol/L    Anion Gap 26 (H) 10 - 20 mmol/L    Urea Nitrogen 57 (H) 6 - 23 mg/dL    Creatinine 10.19 (H) 0.50 - 1.05 mg/dL    eGFR 4 (L) >60  mL/min/1.73m*2    Calcium 7.4 (L) 8.6 - 10.6 mg/dL    Phosphorus 4.9 2.5 - 4.9 mg/dL    Albumin 3.4 3.4 - 5.0 g/dL   POCT GLUCOSE   Result Value Ref Range    POCT Glucose 109 (H) 74 - 99 mg/dL   POCT GLUCOSE   Result Value Ref Range    POCT Glucose 95 74 - 99 mg/dL                 Assessment & Plan  Bacteremia    ESRD (end stage renal disease) (Multi)    History of endocarditis    Murmur, cardiac    Hyperparathyroidism (Multi)    Chronic pain    Edema of right upper arm    CVA (cerebral vascular accident) (Multi)    Opioid use disorder    Rash    Did not Tolerate hemodialysis with no fluid removed.  Pt wanted tx term after 40mins because she was not given benadryl iv with tx    Bp have been elevated on floor, euvolemic on exam and has stable electrolytes. K+=4.4      Outpatient Dialysis schedule: [facility] [place]/Dr Pedersen        Access: rt fem cath- no issues - able to achieve   Rt chest midline  7/23-for LAURA, Thoracentesis     Anemia of ESRD:   epoetin brandy (Epogen) injection 12,000 Units .. Current hgb 10.3.. will cont to monitor     CKD-MBD Phosphate Binder:B complex-vitamin C tablet 1 tablet daily, calcitriol (Rocaltrol) capsule 0.5 mcg daily, ergocalciferol (Vitamin D-2) capsule 1.25 mg weekly     Plan HD tomorrow with UF as tolerated     Renal diet      Please obtain daily standing wt (if possible)     Medication to be adjusted for ESRD      Patient to continue regular HD schedule while inpatient and to follow with the outpatient nephrologist at discharge       ORI Bush-CNP

## 2025-07-22 NOTE — PROGRESS NOTES
Assessment/Plan         Batsheva Page is a 49 y.o. 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 Pt had HD line replaced with temporary line as was thought to be source. Her cultures are positive for candida galbrata and staph epi. She later had a fever at osh and more recent cultures growing proteus. Her current cultures are still positive for yeast and GNRs. Her imaging of her lungs is concerning for pna and loculated effusions of unclear sterility. She had echo at osh that did not show abnormal signs on her prosthetic valves.   ID consulted, on IV zosyn and micafungin. At OSH she had complaints of pain but severe somnolence and concerns for polypharm. On presented she was almost obtunded required sternal rub to awaken on multiple occasions, sedating medications weaned off and monitoring cognitive changes. Pt appears to have issues with chemical coping, so psych team was consulted.  EEG with signs of moderate encephalopathy. Optimizing non-sedating pain medications, pt remains afebrile. Cultures remain positive.     7/22  -psych team evaluated the patient today. she remains firmly in the pre-contemplative stage regarding her opioid and benadryl use. she was not open to medication changes or considering suboxone, and stated her outpatient provider plans to continue prescribing her opioids as before.  -regarding benadryl, she continues to refuse po form and requests iv administration only. she was offered po benadryl at dialysis as a compromise, but remains resistant. she also declined hydroxyzine when offered as an alternative. psych supports the primary team’s plan to avoid unnecessary interventions like iv  benadryl, especially given the lack of clear medical indication.  -her sedation medications (benadryl, lyrica, and oxycodone) were held this afternoon due to concerns for altered mentation; will reassess tomorrow and resume as appropriate.  -patient was also offered a palliative care consult during admission. she expressed a preference to engage with them as an outpatient, citing prior contact and a focus on comfort and pain control. she remains open to following up with them once discharged.  -in terms of thora, IR team has scheduled thoracentesis for tomorrow. the procedure was delayed today due to scheduling limitations and ir team availability. patient will remain npo midnight as sedation is required due to her procedural anxiety.    -Pt had ramon, no infection on vavles, does have echodensity on her hd cath.   -Pending final ID recs. cultures appear no growth  -She need thora of the loculated effusion. Then need to decide on plans for the catheter.   -Avoid unnecessary IV pain med and IV benadryl.      ------------------------------------------------------------    #Proteus, staph epi bacteremia  #Maida galbrata fungemia  #PNA with loculated effusion  #H/o bacteremia, graft infection, endocarditis, prosthetic valve x3  - suspecting line v. Effusions as source, possible valves given replacements but pt had normal echo.  - no current need for optho exam unless develops symtpoms per discussion.   Repeat blood cultures for clearance, will order for 7/20. So far no further staph epi growth. No further candida growth since negative fungal culture 7/18. No current growth to date on culture 7/20, monitoring clearance of proteus.   - ordered thoracentesis and RAMON. Discussed the sedation requirements for each. She is reqeusting general anesthesia for her thoracentesis, explained to patient about how its done that its done with local anesthetic. Unclear if pt will proceed with light sedation. -Given mentation, plan to  avoid sedation except as needed for interventions. Pt was in agreement with temporary code reversal to do procedure during conversations about the testing.    - pt may need new line v. Abx lock therapy. Per outside record, IR left sheath in place to assist in replacement.   -appreciate ID recs.   -pt remains afebrile. Cultures remain positive.   -NPO at midnight in  case of need for sedation         ESRD (end stage renal disease) (Multi)  Hyperparathyroidism  - Hd per nephro  - continue management of hyperparathyroidism per nephro  - R fem line replaced, left sheath in place per record, will need new line once source control achieve  - pt counselled about her declining hd, possible need for a line holiday if unable to clear cultures, need to maintain RRP, advised that she will not be negoting pain medication or benadryl IV for care espicially when there are known safety risks. Today pt was agreeable to proceed with HD.   - Pt likely to need line replaced again.   - at osh she was reporting looking into PD, she will talk about that op, unclear how that will affect her infection risk.     AMS, suspect ams 2/2 polypharm in setting of esrd.   -Has h/o opioid dependence. Appears to have chemical coping.   -Unclear indicaiton of opioids for Restless leg syndrome as well.   -There were concerns with family about her opioid use in the hospital and level of sedation, pt continuing to ask for these medications and benadryl despite not able to be worken up. Prior to dc had meds changed from iv to oral. She requiried sternal rub upon presentation to awaken, could not maintain her attention/alertness. After holding meds this has improved, yet pt despite counselling still requesting and declining care for medication. Had conversation with patient and family, family reports pt will act up at times and to involve family to help to talk to patient if needed  - overall improved. Will avoid IV opioids and benadryl for now, still not  at baseline, also with poor insight.   -She has asked to dc home on 2 occasions despite discussion of seriousness of her condition.   - she does not have capacity to sign ama at this time.   -EEG with signs of moderate encephalopathy. Continue to avoid sedating medicaitons. Pt is on lyrica as well as methocarbamol. Is now reporting pain again. Made methocarbamol prn, monitor for sedation. Stop methocarbamol.        HTN with current hypotensino  - pt is mostly refusing medications at this time  - her carvedilol, amlodpine are on hold  - clonidine reduced to 0.1mg tid    H/o Dvt  No longer on ac    Thrombocytopenia,  - mild intermittant low plts since at least 2019 noted in record. Daciaenlty noted on x 3 days,  Currently has not downtrended further. Suspected related to infection. If further drops may need further work up.     Scheduled outpatient appointments in system:   No future appointments.    ---------------------------------------------------------------------------------------------------  Subjective   No events  ---------------------------------------------------------------------------------------------------  Objective   Last Recorded Vitals  Blood pressure 160/85, pulse 78, temperature 36.5 °C (97.7 °F), resp. rate 19, weight 64.8 kg (142 lb 13.7 oz), SpO2 98%.  Intake/Output last 3 Shifts:  I/O last 3 completed shifts:  In: 824.6 (12.7 mL/kg) [I.V.:424.6 (6.6 mL/kg); Other:400]  Out: 373 (5.8 mL/kg) [Other:373]  Weight: 64.8 kg     Physical Exam  Vitals and nursing note reviewed.   Constitutional:       General: She is not in acute distress.     Appearance: Normal appearance. She is not ill-appearing or toxic-appearing.      Comments: Appear sleepy, but maintains alertness and attention, maintains appropriate conversatin   HENT:      Head: Normocephalic and atraumatic.      Mouth/Throat:      Mouth: Mucous membranes are moist.     Eyes:      General: No scleral icterus.     Extraocular Movements:  Extraocular movements intact.      Conjunctiva/sclera: Conjunctivae normal.     Neck:      Comments: No jvd  Cardiovascular:      Rate and Rhythm: Normal rate and regular rhythm.      Heart sounds: S1 normal and S2 normal. Murmur heard.   Pulmonary:      Effort: Pulmonary effort is normal. No respiratory distress.      Breath sounds: No wheezing, rhonchi or rales.   Abdominal:      General: Bowel sounds are normal. There is no distension.      Palpations: Abdomen is soft.      Tenderness: There is no abdominal tenderness. There is no guarding or rebound.     Musculoskeletal:         General: No swelling or deformity.      Cervical back: Neck supple.     Skin:     General: Skin is warm and dry.      Findings: Lesion present. No rash.      Comments: Mulitple lesions to lower extremities, scattered scabs     Neurological:      General: No focal deficit present.      Mental Status: She is alert and oriented to person, place, and time. Mental status is at baseline.      Sensory: Sensory deficit present.      Motor: No weakness.     Psychiatric:         Mood and Affect: Mood normal.      Comments: Impaired insight, poor memory. Pt sitting in bed, sitting still, no grimacing, calm conversation.          Relevant Results  Lab Results   Component Value Date    WBC 8.9 07/21/2025    HGB 9.0 (L) 07/21/2025    HCT 30.0 (L) 07/21/2025    MCV 97 07/21/2025     07/21/2025      Lab Results   Component Value Date    GLUCOSE 89 07/21/2025    CALCIUM 6.6 (L) 07/21/2025     (L) 07/21/2025    K 4.4 07/21/2025    CO2 23 07/21/2025    CL 91 (L) 07/21/2025    BUN 66 (H) 07/21/2025    CREATININE 11.33 (H) 07/21/2025     Scheduled medications  Scheduled Medications[1]  Continuous medications  Continuous Medications[2]  PRN medications  PRN Medications[3]    Kelly Melendrez MD                 [1] acetaminophen, 975 mg, oral, q8h  [Held by provider] amLODIPine, 5 mg, oral, Daily  aspirin, 81 mg, oral, Daily  atorvastatin, 40 mg,  oral, Nightly  calcitriol, 0.5 mcg, oral, Daily  [Held by provider] carvedilol, 6.25 mg, oral, BID  cloNIDine, 0.1 mg, oral, q8h KIMBERLY  diclofenac sodium, 4 g, Topical, TID  epoetin brandy or biosimilar, 12,000 Units, intravenous, Once per day on Monday Wednesday Friday  ergocalciferol, 1.25 mg, oral, Weekly  levETIRAcetam, 750 mg, oral, Nightly  lidocaine, , Topical, TID  micafungin, 100 mg, intravenous, q24h  pantoprazole, 40 mg, oral, Daily before breakfast  patiromer calcium sorbitex, 8.4 g, oral, Daily  piperacillin-tazobactam, 2.25 g, intravenous, q8h  pregabalin, 75 mg, oral, Daily  sodium chloride, 500 mL, intravenous, Once     [2]    [3] PRN medications: dextrose, dextrose, [Held by provider] diphenhydrAMINE, diphenhydramine-zinc acetate, glucagon, glucagon, hydrALAZINE, ipratropium-albuteroL, [Held by provider] methocarbamol, naloxone, [Held by provider] oxyCODONE

## 2025-07-22 NOTE — PROGRESS NOTES
"Inpatient consult to Chemical Dependency  Consult performed by: Moise Kay MD  Consult ordered by: John Zavala MD           HISTORY OF PRESENT ILLNESS:  Batsheva Page is a 49 y.o. female with a past psychiatric history of unspecified anxiety and a past medical history of ESRD, hypoparathyroidism, opioid dependency, and restless leg syndrome who was admitted to [facility] on 7/17/25 for bacteriemia and fungemia. Psychiatry was consulted on 7/21/25 for substance dependence.    On chart review, Ms. Page is followed by nephrology and neurology at [facility] and has been receiving dialysis for the past 20 years. She also has extensive opioid use for treatment refractory restless leg syndrome requiring percocet 5-325mg QID. There have been numerous times where she has required sternal rub due to somnolence, and patient's daughter has been concerned about her increasing need for substances in the past. On this admission she was scheduled for an outpatient catheter change on 7/10 at [facility]. There is was discovered that she had bacteremia and fungemia and was transferred to [facility] for treatment and to establish new dialysis access.     On interview, patient states that she is aggravated, and wants to leave the hospital. She admits that she might have been \"overmedicated,\" while at the previous hospital, however she feels in general she is able to tolerate her home regimen without issue. She states that she is \"tired of trying new things.\" She previously has been prescribed IV benadryl for her dialysis treatments during prior hospitalizations per her outpatient nephrologist, and as an outpatient she reports taking \"almost a whole bottle\" of benadryl OTC prior to treatments. She states that she will refuse dialysis treatments without the benadryl despite understanding consequences. She states that she has previously tried hydroxyzine and SSRIs for anxiety related to dialysis and that these have not been " "effective.     She rates her anxiety as a 10/10 due to being angry with being in the hospital and not getting her usual medications. She rates her depression as a 5/10 due to being in the hospital. She denies SI, HI, or hallucinations.     For her pain control for restless leg syndrome she reports having previously tried gabapentin and other unspecified medications. She was then offered either oxycodone or methadone and reports that she did not want to be on methadone due to \"not wanting to be a zombie.\" She is not interested in suboxone.     Per chart, prior to transfer, pt had hydromorphone 0.4mg IV Q3H PRN severe pain and diphenhydramine 50mg IV Q3H PRN itching, pt was receiving 6 doses of each in the days prior to transfer    PSYCHIATRIC REVIEW OF SYSTEMS  - Depression: negative  - Anxiety: \"10/10\"   - Suzanne: negative  - Psychosis: negative  - Delirium: negative   - Trauma: negative    PSYCHIATRIC HISTORY  - Prior psychiatric diagnoses: unspecified anxiety   - Prior hospitalizations: none  - History of suicide attempts: none   - History of self-harm/self-injurious behavior(s): none  - History of abuse: denies history of verbal, physical, or sexual abuse   - History of trauma: denies history of significant trauma / none  - History of committing violence: denies     - Current/most recent psychiatric medications: none  - Past psychiatric medications: previously tried SSRI for anxiety   - Past psychiatric treatments: none    - Current psychiatrist:  none  - Current mental health agency: none  - Current : none  - Current outpatient treatment: none  - Guardian or payee: none    Family psychiatric history:      - Psychiatric disorders: grandmother with Hx of ECT unknown Dx     - Suicide: denies      - Substance use: denies      - Meds that have worked or not in relatives: NA /      - Neurologic diseases: denies     SUBSTANCE USE HISTORY   On interview, the patient reports...  Tobacco: denies  Alcohol: " denies  Cannabis: denies  Other substances: denies     Substance use history per chart...  She reports that she has never smoked. She has never used smokeless tobacco. She reports that she does not drink alcohol and does not use drugs.    SOCIAL HISTORY  On interview, the patient reports...     - Living situation: currently lives with boyfriend      - Current employment/source of income: disability     - Current stressors: lack of medications, being in hospital     - Marital history/children: Daughter age 29      - Supportive family relationships: yes     - Education: unknown     - Religiousness/spirituality: Hindu     - Legal History: denies DUI/JANUARY, past incarceration, probation/parole, and active charges/warrants      - Access to firearms: denies     -  History/Weapons training: denies   Social history per chart...  Social History[1]     PAST MEDICAL HISTORY  Medical History[2]   -    PAST SURGICAL HISTORY  Surgical History[3]       FAMILY HISTORY  Family History[4]     ALLERGIES  Compazine [prochlorperazine], Kayexalate, Reglan [metoclopramide hcl], and Zofran [ondansetron hcl]    Some components of the patient's history were obtained through personal review of the patient's available medical records.    OARRS REVIEW  OARRS checked: yes, 7/22/25  OARRS comments:  Unintentional overdose risk score = 320 (average). 15 total providers listed    OBJECTIVE  VITALS      7/21/2025     7:55 PM 7/22/2025    12:15 AM 7/22/2025    12:35 AM 7/22/2025     4:04 AM 7/22/2025     4:05 AM 7/22/2025     5:00 AM 7/22/2025     8:00 AM   Vitals   Systolic 132 158 163 160 160  163   Diastolic 83 85 77 90 85  83   Heart Rate 90 85 84 78   66   Temp 36.6 °C (97.9 °F) 36.1 °C (97 °F)  36.5 °C (97.7 °F)   35.9 °C (96.6 °F)   Resp       18   Weight (lb)      147.05    BMI      24.47 kg/m2    BSA (m2)      1.75 m2         MENTAL STATUS EXAM  - Appearance: awake, alert, and not acutely distressed; appears stated age with  "appropriate hygiene and grooming; dressed in hospital attire  - Attitude: calm, cooperative, and engaged in conversation, guarded, rigid  - Behavior: maintains appropriate eye contact; no aggressive or agitated behavior  - Motor Activity: no psychomotor agitation or retardation; no abnormal, involuntary, or jerky movements; no tremors or tics; no gross muscle stiffness, or spasms; no signs of akithisia or bradykinesia appreciated  - Speech: spontaneous with regular rate, volume, tone, and quantity; articulates clearly and coherently; no pressured speech  - Mood: \"irritable\" (patient reported)  - Affect: euthymic, congruent with mood and topic of conversation; demonstrates full-range; does not appear elated, irritable or tearful; non-labile      - Thought Process: linear, goal-directed; no loose associations or gross thought disorganization appreciated  - Thought Content: denies suicidal or homicidal ideation; no delusional content elicited; no preoccupations or paranoid ideation identified  - Perceptual Disturbances: denies auditory or visual hallucinations; does not appear to be responding to internal stimuli  - Cognition: alert and grossly oriented; no deficits in attention, concentration or language; able to follow and participate in conversation; answers questions appropriately throughout the interview; adequate fund of knowledge  - Insight: limited, as she understands the consequences but she does not seem to appreciate the concerns of the team or willingness to make a plan  - Judgement: limited, as evidenced by the patient's unwillingness to cooperation with staff and hospital routines, and not wanted to create a treatment plan    PHYSICAL EXAM  General: awake, alert, conversant; resting comfortably in bed  HEENT: NCAT, PERRL, no scleral icterus, no JVD appreciated  Cardiac: appears well-perfused, not endorsing chest pain  Pulm: non-labored, on room air  EXT/MSK: no asymmetry noted  Neuro: Aox4 , able to " follow complex commands, no gross focal deficits noted    MEDICAL REVIEW OF SYSTEMS  Review of Systems     HOME MEDICATIONS  Medication Documentation Review Audit       Reviewed by Breanna Phoenix (Technician) on 07/18/25 at 1506      Medication Order Taking? Sig Documenting Provider Last Dose Status   acetaminophen (Tylenol) 325 mg tablet [id number] No Take 2 tablets (650 mg) by mouth every 6 hours as needed for mild pain (1 - 3).   Patient not taking: Reported on 7/18/2025    Washington Perdue, DO Not Taking Flag for Review   amLODIPine (Norvasc) 5 mg tablet [id number] No Take 1 tablet (5 mg) by mouth once daily.   Patient not taking: Reported on 7/18/2025    ANGELA Huynh Not Taking Flag for Review   aspirin 81 mg EC tablet [id number] No Take 1 tablet (81 mg) by mouth once daily.   Patient not taking: Reported on 7/18/2025    Washington Perdue, DO Not Taking Flag for Review   carvedilol (Coreg) 12.5 mg tablet [id number]  Take 1 tablet (12.5 mg) by mouth 2 times a day. ANGELA Kincaid  Active   cloNIDine (Catapres) 0.3 mg tablet [id number]  Take 1 tablet (0.3 mg) by mouth every 8 hours. ANGELA Kincaid  Active   diphenhydramine HCl (BENADRYL ORAL) [id number]  Take 1 tablet by mouth once daily as needed. Take on dialysis days Historical Provider, MD  Active   epoetin brandy-epbx (RETACRIT) 20,000 unit/mL solution [id number] No Inject 6,440 Units under the skin 3 times a week. Do not start before January 17, 2024.   Patient not taking: Reported on 7/18/2025    Washington Perdue, DO Not Taking Active   HYDROmorphone (Dilaudid) 2 mg tablet [id number] No Take 1 tablet (2 mg) by mouth every 6 hours if needed for severe pain (7 - 10).   Patient not taking: Reported on 7/18/2025    ANGELA Huynh Not Taking Active   levETIRAcetam (Keppra) 500 mg tablet [id number]  Take 1.5 tablets (750 mg) by mouth once daily at bedtime. Historical Provider, MD  Active   lidocaine 4 % patch  [id number]  Place 1 patch over 12 hours on the skin once daily. Remove & discard patch within 12 hours or as directed by MD. ANGELA Huynh  Active   methocarbamol (Robaxin) 500 mg tablet [id number]  Take 1 tablet (500 mg) by mouth every 8 hours. ANGELA Huynh  Active   micafungin (Mycamine) IV [id number] No Infuse 100 mL (100 mg) into a venous catheter once daily for 9 days.   Patient not taking: Reported on 7/18/2025    Cordell Steinberg MD Not Taking Flag for Review   oxyCODONE-acetaminophen (Percocet) 5-325 mg tablet [id number] Yes Take 1 tablet by mouth every 4 hours. Historical Provider, MD  Active   pregabalin (Lyrica) 75 mg capsule [id number]  Take 1 capsule (75 mg) by mouth once daily.   Patient taking differently: Take 1 capsule (75 mg) by mouth 2 times a day.    ANGELA Huynh  Active   sevelamer carbonate (Renvela) 800 mg tablet [id number]  Take 1 tablet (800 mg) by mouth 3 times daily (morning, midday, late afternoon). Swallow tablet whole; do not crush, break, or chew.   Patient not taking: Reported on 7/1/2025    ANGELA Huynh  Active   vancomycin 5 mg/mL in sodium chloride 0.9% solution [id number] No 2 mL by intra-catheter route 3 (three) times a week.   Patient not taking: Reported on 7/18/2025    ANGELA Kincaid Not Taking Flag for Review                     CURRENT MEDICATIONS  Scheduled medications  Scheduled Medications[5]    Continuous medications  Continuous Medications[6]    PRN medications  PRN Medications[7]     LABS  Results for orders placed or performed during the hospital encounter of 07/17/25 (from the past 24 hours)   Transesophageal Echo (LAURA)   Result Value Ref Range    LV EF 58 %   Vascular US upper extremity venous duplex right   Result Value Ref Range    BSA 1.72 m2   CBC   Result Value Ref Range    WBC 8.9 4.4 - 11.3 x10*3/uL    nRBC 0.0 0.0 - 0.0 /100 WBCs    RBC 3.10 (L) 4.00 - 5.20 x10*6/uL    Hemoglobin 9.0 (L)  12.0 - 16.0 g/dL    Hematocrit 30.0 (L) 36.0 - 46.0 %    MCV 97 80 - 100 fL    MCH 29.0 26.0 - 34.0 pg    MCHC 30.0 (L) 32.0 - 36.0 g/dL    RDW 16.9 (H) 11.5 - 14.5 %    Platelets 192 150 - 450 x10*3/uL   Renal Function Panel   Result Value Ref Range    Glucose 89 74 - 99 mg/dL    Sodium 131 (L) 136 - 145 mmol/L    Potassium 7.8 (HH) 3.5 - 5.3 mmol/L    Chloride 91 (L) 98 - 107 mmol/L    Bicarbonate 23 21 - 32 mmol/L    Anion Gap 25 (H) 10 - 20 mmol/L    Urea Nitrogen 66 (H) 6 - 23 mg/dL    Creatinine 11.33 (H) 0.50 - 1.05 mg/dL    eGFR 4 (L) >60 mL/min/1.73m*2    Calcium 6.6 (L) 8.6 - 10.6 mg/dL    Phosphorus 6.6 (H) 2.5 - 4.9 mg/dL    Albumin 3.1 (L) 3.4 - 5.0 g/dL   POCT GLUCOSE   Result Value Ref Range    POCT Glucose 104 (H) 74 - 99 mg/dL   Potassium   Result Value Ref Range    Potassium 4.4 3.5 - 5.3 mmol/L   POCT GLUCOSE   Result Value Ref Range    POCT Glucose 497 (H) 74 - 99 mg/dL   POCT GLUCOSE   Result Value Ref Range    POCT Glucose 140 (H) 74 - 99 mg/dL   CBC   Result Value Ref Range    WBC 8.4 4.4 - 11.3 x10*3/uL    nRBC 0.0 0.0 - 0.0 /100 WBCs    RBC 3.44 (L) 4.00 - 5.20 x10*6/uL    Hemoglobin 10.3 (L) 12.0 - 16.0 g/dL    Hematocrit 33.8 (L) 36.0 - 46.0 %    MCV 98 80 - 100 fL    MCH 29.9 26.0 - 34.0 pg    MCHC 30.5 (L) 32.0 - 36.0 g/dL    RDW 16.9 (H) 11.5 - 14.5 %    Platelets 253 150 - 450 x10*3/uL   Renal Function Panel   Result Value Ref Range    Glucose 89 74 - 99 mg/dL    Sodium 136 136 - 145 mmol/L    Potassium 4.4 3.5 - 5.3 mmol/L    Chloride 92 (L) 98 - 107 mmol/L    Bicarbonate 22 21 - 32 mmol/L    Anion Gap 26 (H) 10 - 20 mmol/L    Urea Nitrogen 57 (H) 6 - 23 mg/dL    Creatinine 10.19 (H) 0.50 - 1.05 mg/dL    eGFR 4 (L) >60 mL/min/1.73m*2    Calcium 7.4 (L) 8.6 - 10.6 mg/dL    Phosphorus 4.9 2.5 - 4.9 mg/dL    Albumin 3.4 3.4 - 5.0 g/dL   POCT GLUCOSE   Result Value Ref Range    POCT Glucose 109 (H) 74 - 99 mg/dL        IMAGING  Imaging  No results found.    Cardiology, Vascular, and Other  Imaging  Transesophageal Echo (LAURA)  Result Date: 7/21/2025  [facility] - Dept of Anesthesiology    [address] [place] [address]       Tel [phone] and Fax [phone] TRANSESOPHAGEAL ECHOCARDIOGRAM REPORT  Patient Name:       RITA Aguiar Physician:    [id number] Raeann Delarosa MD Study Date:         7/21/2025           Ordering Provider:    [id number] ROBBIN GARCIA MRN/PID:            [id number]            Fellow:               Esa Coe Accession#:         [id number]        Nurse:                Giovanni Escalante RN Date of Birth/Age:  1975 / 49     Sonographer:                     years Gender assigned at  F                   Additional Staff:     Catina Morejon RN Birth: Height:             165.10 cm           Admit Date:           7/18/2025 Weight:             64.41 kg            Admission Status:     Inpatient -                                                               Routine BSA / BMI:          1.71 m2 / 23.63     Encounter#:           [id number]                     kg/m2 Blood Pressure:     166/95 mmHg         Department Location:  [facility]                                                               Non Invasive Study Type:    TRANSESOPHAGEAL ECHO (LAURA) Diagnosis/ICD: Bacteremia-R78.81 Indication:    Positive Blood Cultures CPT Code:      LAURA Complete-44641 Patient History: Allergies:        COMPAZINE, KAYEXALATE, REGLAN, ZOFRAN. Smoker:           Never. Valve Disorders:  Aortic Valve Replacement and Mitral Valve Replacement. Pertinent         COPD, CAD and CVA. Hyperthyroid, ESRD, MVR Replacement, History:          Seizures,. Study Detail: The following Echo studies were performed: 2D. Agitated saline               used as a contrast agent for intraseptal flow evaluation.               Patient's heart rhythm is normal  sinus rhythm. There is no               evidence of QRS widening on the EKG tracing. A bubble study was               not performed.  PHYSICIAN INTERPRETATION: LAURA Details: The LAURA probe used was Probe 6. Technically adequate omniplane transesophageal echocardiogram performed. LAURA Medication: The pharynx was anesthetized with viscous lidocaine and Cetacaine spray. The patient was sedated by Anesthesia; please refer to anesthesia flow sheet for medications used. Total intraservice time for moderate sedation was 30 minutes. LAURA Procedure: The probe was passed without difficulty. Left Ventricle: The left ventricular systolic function is normal with a visually estimated ejection fraction of 55-60%. The left ventricular cavity size is normal. Left ventricular diastolic filling was not assessed. Left Atrium: The left atrial size was not assessed. A bubble study using agitated saline was not performed. There is no thrombus visualized in the left atrial appendage, the left atrial appendage Doppler velocities are reduced and there is spontaneous contrast noted in the left atrial appendage. Right Ventricle: The right ventricle is normal in size. There is normal right ventricular global systolic function. Right Atrium: The right atrial size was not assessed. There is a device visualized in the right atrium. The tip of the femoral dialysis catheter is visualized in the RA via the IVC. There is a small (0.3 cm), thin echodensity attached to it, more likely fibrinous material vs vegetation. Aortic Valve: There is no visualized aortic valve vegetation. There is a prosthetic aortic valve present. There is an Kruse bioprosthetic type aortic valve bioprosthesis with a 21 mm reported size. There is trace to mild aortic valve regurgitation. Mitral Valve: There is a prosthetic mitral valve present. The doppler estimated peak and mean diastolic pressure gradients are 10.5 mmHg and 4 mmHg, respectively. There was no mitral valve  vegetation visualized. There is a [place] bioprosthetic type mitral valve prosthesis with a 27 mm reported size. The peak and mean gradients are 10 mmHg and 4 mmHg respectively. The mean gradient of the mitral valve is 4 mmHg. There is trace mitral valve regurgitation. Tricuspid Valve: No vegetation is seen on the tricuspid valve. Status post tricuspid valve repair. The doppler estimated peak and mean diastolic gradients are 2.8 mmHg and 2.0 mmHg, respectively. There is moderate tricuspid regurgitation. Pulmonic Valve: No pulmonic valve vegetation visualized. The pulmonic valve is structurally normal. There is trace pulmonic valve regurgitation. Grossly normal although views somewhat limited due to shadowing from aortic valve prosthesis. Pericardium: There is no pericardial effusion noted. Aorta: The aortic root is normal. There is no dilatation of the ascending aorta. Pulmonary Veins: Left upper and right upper pulmonary veins appear normal. In comparison to the previous echocardiogram(s): Compared with study dated 7/14/2025, this is a transesophageal echo. Valves were better visualized with no vegetations seen. See findings re: catheter tip echodensity. Tricuspid regurgitation was better visualized on this exam and characterized as moderate.  CONCLUSIONS:  1. The left ventricular systolic function is normal with a visually estimated ejection fraction of 55-60%.  2. There is normal right ventricular global systolic function.  3. The tip of the femoral dialysis catheter is visualized in the RA via the IVC. There is a small (0.3 cm), thin echodensity attached to it, more likely fibrinous material vs vegetation.  4. No mitral valve vegetation visualized.  5. There is a [place]. Devonte bioprosthetic type mitral valve prosthesis with a 27 mm reported size. The peak and mean gradients are 10 mmHg and 4 mmHg respectively.  6. Status post tricuspid valve repair.  7. Moderate tricuspid regurgitation visualized.  8. No tricuspid  valve vegetation.  9. No aortic valve vegetation visualized. 10. There is an Kruse bioprosthetic type aortic valve bioprosthesis with a 21 mm reported size. 11. No pulmonic valve vegetation visualized. 12. Overall, there is no evidence of vegetation on the native or prosthetic valves. There is a small, thin echodensity on the tip of the dialysis catheter in the RA via the IVC. 13. Compared with study dated 2025, this is a transesophageal echo. Valves were better visualized with no vegetations seen. See findings re: catheter tip echodensity. Tricuspid regurgitation was better visualized on this exam and characterized as moderate. QUANTITATIVE DATA SUMMARY:  AORTA MEASUREMENTS:         Normal Ranges: Ao Sinus, d:        2.90 cm (2.1-3.5cm)  LV SYSTOLIC FUNCTION:                      Normal Ranges: EF-Visual:      58 % LV EF Reported: 58 %  MITRAL VALVE:           Normal Ranges: MV Vmax:      1.62 m/s  (<=1.3m/s) MV peak PG:   10.5 mmHg (<5mmHg) MV mean P.0 mmHg  (<2mmHg)  TRICUSPID VALVE/RVSP:          Normal Ranges: TV Vmax               0.83 m/s TV mean P.0 mmHg TV Peak P.8 mmHg  [id number] Raeann Delarosa MD Electronically signed on 2025 at 2:49:03 PM  ** Final **     Vascular US upper extremity venous duplex right  Result Date: 2025  Preliminary Cardiology Report           [place] [address]   Tel [phone] and Fax [phone]        Preliminary Vascular Lab Report  VASC US UPPER EXTREMITY VENOUS DUPLEX RIGHT  Patient Name:     RITA Aguiar Physician: [id number] Radha TEMPLE Study Date:       2025           Ordering           [id number] ROBBIN                                       Physician:         RADHA MRN/PID:          [id number]            Technologist:      David NICE Accession#:       [id number]        Technologist 2: Date of           1975           Encounter#:        [id number] Birth/Age: Gender:           F Admission Status: Inpatient           Location           [facility]                                       Performed:  Diagnosis/ICD: Generalized edema (anasarca)-R60.1 Indication:    Limb swelling. Procedure/CPT: 78876 Peripheral venous duplex scan for DVT Limited  Patient History: LE Edema, Renal Failure, CAD and HTN. Multiple failed grafts.  PRELIMINARY CONCLUSIONS: Right Upper Venous: No evidence of acute deep vein thrombus visualized in the right upper extremity. Technically difficult exam due to positioning. The right IJV technically difficult to compress.  Imaging & Doppler Findings:  Right               Compressible Thrombus        Flow Internal Jugular        Yes        None   Spontaneous/Phasic Subclavian Proximal     Yes        None       Pulsatile Subclavian Mid          Yes        None       Pulsatile Subclavian Distal       Yes        None       Pulsatile Axillary                Yes        None       Pulsatile Brachial                Yes        None Cephalic                Yes        None Basilic                 Yes        None  Left                  Flow Subclavian Proximal Pulsatile VASCULAR PRELIMINARY REPORT completed by David NICE on 7/21/2025 at 1:18:31 PM  ** Final **          PSYCHIATRIC RISK ASSESSMENT  - Violence Risk Factors:  none  - Acute Risk of Harm to Others is Considered: Low  - Suicide Risk Factors: having a disability , chronic medical illness, and chronic pain  - Protective Factors: Rastafarian affiliation/spirituality and positive family relationships  - Acute Risk of Harm to Self is Considered: Low    ASSESSMENT AND PLAN  Ms. Page is a 50 yo female with a PMHx of ESRD, restless leg syndrome and opioid dependency who was admitted on 7/17 for bacteriemia and fungemia. Patient initially admitted at [facility] and was transferred due to increased somnolence requiring multiple sternal rubs, and increased  "treatment for her infections. She has previously needed Benadryl prior to dialysis treatments, either IV during previous admissions or PO as an outpatient. She additionally uses percocet QID for management of her treatment refractory restless leg syndrome. Psychiatry was consulted due to substance dependence, and possibility of starting suboxone while in patient.     Based on interview Ms. Page is in the pre contemplative phase as it pertains to her benadryl and opioid use. She is not interested in changing her regimen. Further consideration to change would warrant discussion with outpatient provider as there is limited utility to change if she will resume home dosing after discharge. She may benefit from a reduced dose, but this needs to be coordinated with outpatient provider to develop a long term plan.     In regards to the benadryl, she is refusing interventions (including HD) without IV. Discussed with patient that while she has had IV diphenhydramine during past admissions, if able to take PO it is not indicated to use IV and would need to consider using either PO or other options for short term during current admission to tolerate dialysis.    At this time of psychiatry interview, pt understands the consequences of continued dialysis refusal.     Additionally, would recommend consulting palliative care, as her primary concerns seem to be pain relief and comfort. She says that she has engaged with them during previous admission 4/2025 and is open to further discussion of GOC.    IMPRESSION  #Adjustment disorder with anxiety   #Opioid dependence     RECOMMENDATIONS  Safety:  - Patient does not currently meet criteria for inpatient psychiatric admission.   - Patient does not require a 1:1 sitter from a psychiatric perspective at this time.  - To evaluate decision-making capacity, recommend use of the Capacity Evaluation Tool. Search “[facility] Capacity Evaluation\" under SmartText   - Defer to primary team " decision for 1:1 sitter.  - As with all hospitalized patients, recommend delirium precautions such as minimizing use of benzos, opiates, and anticholinergic meds, to the extent possible, adequate treatment of pain, frequent reorientation, maintaining sleep-wake cycle (blinds closed at night, open during day), other supportive interventions such as adequate hydration, regular bowel movements, PT as able, etc.    Medications:  No acute medication recommendations at this time    Ancillary Services:  - none- patient denies     Follow-up:  - N/A     ==========  - Discussed recommendations with primary team.  - Psychiatry will sign off.  However, if the patient would like to discuss other options for anxiety management, please re-engage psychiatry    Thank you for allowing us to participate in the care of this patient. Please page [phone] with any questions or concerns.    Patient staffed with attending physician Dr. Espinosa, who agrees with above plan.    Medication Consent  Medication Consent: n/a; consult service    ELOY GALLOWAY, MS3         [1]   Social History  Socioeconomic History    Marital status:    Tobacco Use    Smoking status: Never    Smokeless tobacco: Never   Vaping Use    Vaping status: Never Used   Substance and Sexual Activity    Alcohol use: Never    Drug use: Never     Social Drivers of Health     Financial Resource Strain: Low Risk  (7/19/2025)    Overall Financial Resource Strain (CARDIA)     Difficulty of Paying Living Expenses: Not very hard   Food Insecurity: Patient Declined (7/18/2025)    Hunger Vital Sign     Worried About Running Out of Food in the Last Year: Patient declined     Ran Out of Food in the Last Year: Patient declined   Transportation Needs: No Transportation Needs (7/19/2025)    PRAPARE - Transportation     Lack of Transportation (Medical): No     Lack of Transportation (Non-Medical): No   Physical Activity: Inactive (7/12/2025)    Exercise Vital Sign     Days of  Exercise per Week: 0 days     Minutes of Exercise per Session: 0 min   Stress: No Stress Concern Present (7/12/2025)    [place] [employer] - Occupational Stress Questionnaire     Feeling of Stress : Not at all   Social Connections: Moderately Integrated (7/12/2025)    Social Connection and Isolation Panel     Frequency of Communication with Friends and Family: More than three times a week     Frequency of Social Gatherings with Friends and Family: More than three times a week     Attends Jainism Services: More than 4 times per year     Active Member of Clubs or Organizations: No     Attends Club or Organization Meetings: Never     Marital Status: Living with partner   Intimate Partner Violence: Patient Declined (7/18/2025)    Humiliation, Afraid, Rape, and Kick questionnaire     Fear of Current or Ex-Partner: Patient declined     Emotionally Abused: Patient declined     Physically Abused: Patient declined     Sexually Abused: Patient declined   Housing Stability: Unknown (7/19/2025)    Housing Stability Vital Sign     Unable to Pay for Housing in the Last Year: No     Homeless in the Last Year: No   [2]   Past Medical History:  Diagnosis Date    Aortic valve replaced 2022    CHF (congestive heart failure)     Chronic pain     Coronary artery disease     Disease of thyroid gland     ESRD (end stage renal disease) (Multi)     H/O mitral valve replacement 2013    and 2022    Heart disease     History of transfusion     Hypertension     Mitral valve regurgitation     Seizures (Multi)     Stroke (Multi)    [3]   Past Surgical History:  Procedure Laterality Date    AORTIC VALVE REPLACEMENT  09/26/2022    bioprosthetic    CORONARY ARTERY BYPASS GRAFT      IR CVC  01/12/2024    exchange    IR CVC  05/07/2024    exchange    IR CVC  08/20/2024    removal    IR CVC TUNNELED  09/09/2022    IR CVC TUNNELED 9/9/2022 [facility] INPATIENT LEGACY    IR CVC TUNNELED  12/28/2022    IR CVC TUNNELED 12/28/2022 [facility]  INPATIENT LEGACY    MITRAL VALVE REPAIR  2013    MITRAL VALVE REPLACEMENT  09/26/2022    bioprosthetic    PARATHYROIDECTOMY      TRICUSPID VALVULOPLASTY  2013    US GUIDED PERCUTANEOUS PLACEMENT  07/14/2022    US GUIDED PERCUTANEOUS PLACEMENT [facility]   [4]   Family History  Problem Relation Name Age of Onset    No Known Problems Mother      No Known Problems Father     [5] acetaminophen, 975 mg, oral, q8h  [Held by provider] amLODIPine, 5 mg, oral, Daily  aspirin, 81 mg, oral, Daily  atorvastatin, 40 mg, oral, Nightly  calcitriol, 0.5 mcg, oral, Daily  [Held by provider] carvedilol, 6.25 mg, oral, BID  cloNIDine, 0.1 mg, oral, q8h KIMBERLY  diclofenac sodium, 4 g, Topical, TID  epoetin brandy or biosimilar, 12,000 Units, intravenous, Once per day on Monday Wednesday Friday  ergocalciferol, 1.25 mg, oral, Weekly  levETIRAcetam, 750 mg, oral, Nightly  lidocaine, , Topical, TID  micafungin, 100 mg, intravenous, q24h  pantoprazole, 40 mg, oral, Daily before breakfast  patiromer calcium sorbitex, 8.4 g, oral, Daily  piperacillin-tazobactam, 2.25 g, intravenous, q8h  pregabalin, 75 mg, oral, Daily  sodium chloride, 500 mL, intravenous, Once  [6]    [7] PRN medications: dextrose, dextrose, [Held by provider] diphenhydrAMINE, diphenhydramine-zinc acetate, glucagon, glucagon, hydrALAZINE, ipratropium-albuteroL, [Held by provider] methocarbamol, naloxone, [Held by provider] oxyCODONE

## 2025-07-22 NOTE — NURSING NOTE
End of Shift Note:    1603: This RN went in to pt room to give pt her medication. Pt was hard to arouse. When name was called, pt will  wake up and immediately dozed back off. When this RN tried to put medication cup in pt hand , she will grab the cup and dozed off again. VSS except respirations of 10. Dr. Floyd was notified to come to bedside. Lab was collected as ordered Chest X-Ray ordered. Dr. Floyd came to bedside. During that time pt came more alert. Daughter called floor upset and this RN explain the situation. Pt daughter was speaking aggressively towards this RN while trying to explain the situation. Daughter asked to to speak to the MD. Pt threaten to leave AMA. Dr. Floyd. Dr. Floyd was notified to call daughter. At this, pt is in the bed in lowest position, call bell within reach. No new orders at this time.    Jennifer Carreno,RN

## 2025-07-22 NOTE — SIGNIFICANT EVENT
"Capacity Assessment Tool    \"Capacity\" is the \"ability\" to make a decision.  The decision in question must be specific (one decision), relevant to a patient's current condition (appropriate), and timely (neither prospective nor retrospective).    Capacity varies based on knowledge base (explanation/understanding of clinical information), cognitive processing, acute psychiatric illness, and other clinical conditions.    In order to be deemed \"capacitated\" to make a single decision at one point in time, a patient must demonstrate all 4 of the following elements:    *Ability to consistently communicate a choice (consistent over time with adequate information)  *Ability to understand the relevant information (accurate knowledge of condition)  *Ability to appreciate the situation and its consequences (risks/benefits, pros/cons)  *Ability to reason about treatment options (without undue influence of a person or condition, eg. suicidality or acute psychosis)      Current Decision    Clinical issue:   AMA discharge    Did the appropriate team address relevant information with the patient:  Yes    Date: 7/22/2025    If \"NO\" is selected for appropriate team, then please discuss with the appropriate team.  The appropriate team should be encouraged to address relevant information with the patient AND reevaluate capacity when appropriate.    Capacity Evaluation    Patient demonstrates ability to consistently communicate choice:  Yes     Patient demonstrates ability to understand the relevant information:  No     Patient demonstrates ability to appreciate the situation and its consequences:  No     Patient demonstrates ability to reason about treatment options:  No     If ANY of the above items are answered \"NO,\" the patient LACKS CAPACITY for that specific decision at hand, at that specific time.  Further capacity evaluations can be done as needed.         "

## 2025-07-23 ENCOUNTER — APPOINTMENT (OUTPATIENT)
Dept: RADIOLOGY | Facility: HOSPITAL | Age: 50
DRG: 871 | End: 2025-07-23
Payer: MEDICARE

## 2025-07-23 VITALS
SYSTOLIC BLOOD PRESSURE: 161 MMHG | WEIGHT: 147.05 LBS | RESPIRATION RATE: 18 BRPM | HEART RATE: 84 BPM | BODY MASS INDEX: 24.47 KG/M2 | DIASTOLIC BLOOD PRESSURE: 96 MMHG | TEMPERATURE: 98.1 F | OXYGEN SATURATION: 98 %

## 2025-07-23 LAB
ALBUMIN SERPL BCP-MCNC: 3.3 G/DL (ref 3.4–5)
ANION GAP SERPL CALC-SCNC: 27 MMOL/L (ref 10–20)
BACTERIA BLD AEROBE CULT: ABNORMAL
BACTERIA BLD AEROBE CULT: ABNORMAL
BACTERIA BLD CULT: ABNORMAL
BACTERIA BLD CULT: ABNORMAL
BASOPHILS # BLD AUTO: 0.05 X10*3/UL (ref 0–0.1)
BASOPHILS NFR BLD AUTO: 0.6 %
BUN SERPL-MCNC: 65 MG/DL (ref 6–23)
CALCIUM SERPL-MCNC: 6.7 MG/DL (ref 8.6–10.6)
CHLORIDE SERPL-SCNC: 94 MMOL/L (ref 98–107)
CO2 SERPL-SCNC: 20 MMOL/L (ref 21–32)
CREAT SERPL-MCNC: 11.23 MG/DL (ref 0.5–1.05)
EGFRCR SERPLBLD CKD-EPI 2021: 4 ML/MIN/1.73M*2
EOSINOPHIL # BLD AUTO: 0.47 X10*3/UL (ref 0–0.7)
EOSINOPHIL NFR BLD AUTO: 5.6 %
ERYTHROCYTE [DISTWIDTH] IN BLOOD BY AUTOMATED COUNT: 17 % (ref 11.5–14.5)
GLUCOSE BLD MANUAL STRIP-MCNC: 101 MG/DL (ref 74–99)
GLUCOSE SERPL-MCNC: 90 MG/DL (ref 74–99)
GRAM STN SPEC: ABNORMAL
GRAM STN SPEC: ABNORMAL
HCT VFR BLD AUTO: 32.8 % (ref 36–46)
HGB BLD-MCNC: 9.8 G/DL (ref 12–16)
IMM GRANULOCYTES # BLD AUTO: 0.22 X10*3/UL (ref 0–0.7)
IMM GRANULOCYTES NFR BLD AUTO: 2.6 % (ref 0–0.9)
LYMPHOCYTES # BLD AUTO: 1.27 X10*3/UL (ref 1.2–4.8)
LYMPHOCYTES NFR BLD AUTO: 15.2 %
MAGNESIUM SERPL-MCNC: 2.1 MG/DL (ref 1.6–2.4)
MCH RBC QN AUTO: 29.2 PG (ref 26–34)
MCHC RBC AUTO-ENTMCNC: 29.9 G/DL (ref 32–36)
MCV RBC AUTO: 98 FL (ref 80–100)
MONOCYTES # BLD AUTO: 0.48 X10*3/UL (ref 0.1–1)
MONOCYTES NFR BLD AUTO: 5.8 %
NEUTROPHILS # BLD AUTO: 5.84 X10*3/UL (ref 1.2–7.7)
NEUTROPHILS NFR BLD AUTO: 70.2 %
NRBC BLD-RTO: 0 /100 WBCS (ref 0–0)
PHOSPHATE SERPL-MCNC: 5.3 MG/DL (ref 2.5–4.9)
PLATELET # BLD AUTO: 250 X10*3/UL (ref 150–450)
POTASSIUM SERPL-SCNC: 4.4 MMOL/L (ref 3.5–5.3)
RBC # BLD AUTO: 3.36 X10*6/UL (ref 4–5.2)
SODIUM SERPL-SCNC: 137 MMOL/L (ref 136–145)
WBC # BLD AUTO: 8.3 X10*3/UL (ref 4.4–11.3)

## 2025-07-23 PROCEDURE — 85025 COMPLETE CBC W/AUTO DIFF WBC: CPT | Performed by: INTERNAL MEDICINE

## 2025-07-23 PROCEDURE — 2500000001 HC RX 250 WO HCPCS SELF ADMINISTERED DRUGS (ALT 637 FOR MEDICARE OP): Performed by: STUDENT IN AN ORGANIZED HEALTH CARE EDUCATION/TRAINING PROGRAM

## 2025-07-23 PROCEDURE — 36415 COLL VENOUS BLD VENIPUNCTURE: CPT | Performed by: INTERNAL MEDICINE

## 2025-07-23 PROCEDURE — 82947 ASSAY GLUCOSE BLOOD QUANT: CPT

## 2025-07-23 PROCEDURE — 2500000004 HC RX 250 GENERAL PHARMACY W/ HCPCS (ALT 636 FOR OP/ED): Performed by: STUDENT IN AN ORGANIZED HEALTH CARE EDUCATION/TRAINING PROGRAM

## 2025-07-23 PROCEDURE — 99239 HOSP IP/OBS DSCHRG MGMT >30: CPT | Performed by: STUDENT IN AN ORGANIZED HEALTH CARE EDUCATION/TRAINING PROGRAM

## 2025-07-23 PROCEDURE — 80069 RENAL FUNCTION PANEL: CPT | Performed by: INTERNAL MEDICINE

## 2025-07-23 PROCEDURE — 83735 ASSAY OF MAGNESIUM: CPT | Performed by: STUDENT IN AN ORGANIZED HEALTH CARE EDUCATION/TRAINING PROGRAM

## 2025-07-23 PROCEDURE — 99231 SBSQ HOSP IP/OBS SF/LOW 25: CPT | Performed by: INTERNAL MEDICINE

## 2025-07-23 RX ADMIN — CALCITRIOL 0.5 MCG: 0.5 CAPSULE, LIQUID FILLED ORAL at 08:39

## 2025-07-23 RX ADMIN — PIPERACILLIN SODIUM AND TAZOBACTAM SODIUM 2.25 G: 2; .25 INJECTION, SOLUTION INTRAVENOUS at 10:18

## 2025-07-23 RX ADMIN — CARVEDILOL 6.25 MG: 6.25 TABLET, FILM COATED ORAL at 08:38

## 2025-07-23 RX ADMIN — AMLODIPINE BESYLATE 10 MG: 10 TABLET ORAL at 08:39

## 2025-07-23 RX ADMIN — PIPERACILLIN SODIUM AND TAZOBACTAM SODIUM 2.25 G: 2; .25 INJECTION, SOLUTION INTRAVENOUS at 02:40

## 2025-07-23 RX ADMIN — ASPIRIN 81 MG CHEWABLE TABLET 81 MG: 81 TABLET CHEWABLE at 08:38

## 2025-07-23 ASSESSMENT — COGNITIVE AND FUNCTIONAL STATUS - GENERAL
MOBILITY SCORE: 16
CLIMB 3 TO 5 STEPS WITH RAILING: A LOT
TURNING FROM BACK TO SIDE WHILE IN FLAT BAD: A LITTLE
MOVING TO AND FROM BED TO CHAIR: A LITTLE
STANDING UP FROM CHAIR USING ARMS: A LITTLE
MOVING FROM LYING ON BACK TO SITTING ON SIDE OF FLAT BED WITH BEDRAILS: A LITTLE
WALKING IN HOSPITAL ROOM: A LOT

## 2025-07-23 ASSESSMENT — PAIN SCALES - GENERAL: PAINLEVEL_OUTOF10: 7

## 2025-07-23 NOTE — CONSULTS
"Patient was transported to IR department for thoracentesis. On interview of patient she refuses thoracentesis without any kind of sedation. \"I don't want to feel anything\" per patient. I discussed at length the risks and benefits of sedation and thoracentesis to the patient. She states she does not want the thoracentesis procedure. Patient was sent back to room by transport, managing team notified.  "

## 2025-07-23 NOTE — PROGRESS NOTES
Batsheva PABLO Camilo   49 y.o.    @WT@  MRN/Room: [id number]    Subjective:   I visited and examined the patient on bedside.  She had an uneventful night.      Meds:   Current Medications[1]         Physical Examination:        Vitals:    07/23/25 0751   BP: (!) 161/96   Pulse: 84   Resp: 18   Temp: 36.7 °C (98.1 °F)   SpO2: 98%     General: The patient is awake, oriented, and is not in any distress.  Head and Neck: Normocephalic. No periorbital edema.  Eyes: non-icteric  Respiratory: Symmetric chest expansion. No respiratory distress.  Skin: No maculopapular rash.  Musculoskeletal: No peripheral edema.  Neuro Exam: Speech is fluent. Moves extremities.    Imaging:  === 12/23/22 ===    US RIGHT UPPER QUADRANT    - Impression -  Atrophic right kidney. Otherwise, unremarkable right upper quadrant  ultrasound.      I personally reviewed the images/study and I agree with the resident  findings as stated. This study was interpreted at [place], [place].       Blood Labs:  Results for orders placed or performed during the hospital encounter of 07/17/25 (from the past 24 hours)   POCT GLUCOSE   Result Value Ref Range    POCT Glucose 95 74 - 99 mg/dL   Blood Gas Arterial Full Panel   Result Value Ref Range    POCT pH, Arterial 7.42 7.38 - 7.42 pH    POCT pCO2, Arterial 35 (L) 38 - 42 mm Hg    POCT pO2, Arterial 64 (L) 85 - 95 mm Hg    POCT SO2, Arterial 93 (L) 94 - 100 %    POCT Oxy Hemoglobin, Arterial 89.9 (L) 94.0 - 98.0 %    POCT Hematocrit Calculated, Arterial 48.0 (H) 36.0 - 46.0 %    POCT Sodium, Arterial 128 (L) 136 - 145 mmol/L    POCT Potassium, Arterial 4.2 3.5 - 5.3 mmol/L    POCT Chloride, Arterial 95 (L) 98 - 107 mmol/L    POCT Ionized Calcium, Arterial 0.75 (LL) 1.10 - 1.33 mmol/L    POCT Glucose, Arterial 119 (H) 74 - 99 mg/dL    POCT Lactate, Arterial 1.6 0.4 - 2.0 mmol/L    POCT Base Excess, Arterial -1.2 -2.0 - 3.0 mmol/L    POCT HCO3 Calculated,  Arterial 22.7 22.0 - 26.0 mmol/L    POCT Hemoglobin, Arterial 16.1 (H) 12.0 - 16.0 g/dL    POCT Anion Gap, Arterial 15 10 - 25 mmo/L    Patient Temperature 37.0 degrees Celsius    FiO2 21 %   POCT GLUCOSE   Result Value Ref Range    POCT Glucose 143 (H) 74 - 99 mg/dL   POCT GLUCOSE   Result Value Ref Range    POCT Glucose 101 (H) 74 - 99 mg/dL        Lab Results   Component Value Date    GLUCOSE 89 07/22/2025    CALCIUM 7.4 (L) 07/22/2025     07/22/2025    K 4.4 07/22/2025    CO2 22 07/22/2025    CL 92 (L) 07/22/2025    BUN 57 (H) 07/22/2025    CREATININE 10.19 (H) 07/22/2025             Assessment and Plan:  49-year-old female with end-stage renal disease on hemodialysis sure right femoral catheter.  She will be dialyzed today as per her regular schedule.          Freddy Coleman MD  Senior Attending Physician  Director of Onco-Nephrology Program  Division of Nephrology & Hypertension  [facility]         [1]   Current Facility-Administered Medications   Medication Dose Route Frequency Provider Last Rate Last Admin    acetaminophen (Tylenol) tablet 650 mg  650 mg oral q6h PRN Kelly Melendrez MD        amLODIPine (Norvasc) tablet 10 mg  10 mg oral Daily Kelly Melendrez MD   10 mg at 07/23/25 0839    aspirin chewable tablet 81 mg  81 mg oral Daily Jessica Jim DO   81 mg at 07/23/25 0838    atorvastatin (Lipitor) tablet 40 mg  40 mg oral Nightly Jessica Jim DO   40 mg at 07/22/25 2115    B complex-vitamin C tablet 1 tablet  1 tablet oral Daily Kelly Melendrez MD   1 tablet at 07/22/25 1248    calcitriol (Rocaltrol) capsule 0.5 mcg  0.5 mcg oral Daily Jessica Jim DO   0.5 mcg at 07/23/25 0839    carvedilol (Coreg) tablet 6.25 mg  6.25 mg oral BID Kelly Melendrez MD   6.25 mg at 07/23/25 0838    [Held by provider] cloNIDine (Catapres) tablet 0.1 mg  0.1 mg oral q8h [facility] John Zavala MD   0.1 mg at 07/22/25 0958    dextrose 50 % injection 12.5 g   12.5 g intravenous q15 min PRN Jessica Jim DO        dextrose 50 % injection 25 g  25 g intravenous q15 min PRN Jessica Jim DO        diclofenac sodium (Voltaren) 1 % gel 4 g  4 g Topical TID John Zavala MD        diphenhydrAMINE (BENADryl) capsule 25 mg  25 mg oral q6h PRN Kelly Melendrez MD        diphenhydramine-zinc acetate cream   Topical TID PRN Jessica Jim DO        epoetin brandy (Epogen) injection 12,000 Units  12,000 Units intravenous Once per day on Monday Wednesday Friday ORI Bush-CNP        ergocalciferol (Vitamin D-2) capsule 1.25 mg  1.25 mg oral Weekly Jessica Jim DO        glucagon (Glucagen) injection 1 mg  1 mg intramuscular q15 min PRN Jessica Jim DO        glucagon (Glucagen) injection 1 mg  1 mg intramuscular q15 min PRN Jessica Jim DO        hydrALAZINE (Apresoline) tablet 25 mg  25 mg oral TID PRN Jessica Jim DO        ipratropium-albuteroL (Duo-Neb) 0.5-2.5 mg/3 mL nebulizer solution 3 mL  3 mL nebulization q6h PRN Jessica Jim DO        levETIRAcetam (Keppra) tablet 750 mg  750 mg oral Nightly Jessica Jim DO   750 mg at 07/22/25 2115    lidocaine (LMX) 4 % cream   Topical TID John Zavala MD   Given at 07/20/25 1810    [Held by provider] methocarbamol (Robaxin) tablet 500 mg  500 mg oral q6h PRN John Zavala MD   500 mg at 07/20/25 2152    micafungin (Mycamine) 100 mg in dextrose 5%  mL  100 mg intravenous q24h Jessica Jim DO   Stopped at 07/23/25 0036    naloxone (Narcan) injection 0.4 mg  0.4 mg intravenous q5 min PRN Jessica Jim DO        [Held by provider] oxyCODONE (Roxicodone) immediate release tablet 5 mg  5 mg oral q6h PRN Jessica Jim DO        pantoprazole (ProtoNix) EC tablet 40 mg  40 mg oral Daily before breakfast Jessica Jim DO   40 mg at 07/20/25 0624    patiromer calcium sorbitex (Veltassa) packet 8.4 g  8.4 g oral Daily Jessica LOONEY  DO Hernán        piperacillin-tazobactam (Zosyn) 2.25 g in dextrose (iso) IV 50 mL  2.25 g intravenous q8h Jessica Jim DO   Stopped at 07/23/25 0325    [Held by provider] pregabalin (Lyrica) capsule 75 mg  75 mg oral Daily Jessica Jim DO   75 mg at 07/22/25 0958    sodium chloride 0.9 % bolus 500 mL  500 mL intravenous Once John Zavala MD

## 2025-07-23 NOTE — DISCHARGE SUMMARY
Discharge Diagnosis  Bacteremia    Issues Requiring Follow-Up  PCP follow up     Test Results Pending At Discharge  Pending Labs       Order Current Status    Blood Culture Preliminary result    Blood Culture Preliminary result    Blood Culture Preliminary result    Blood Culture Preliminary result            Hospital Course    The writer took the care of Ms. Batsheva Page on 7/22. At the time of transition, I was informed of her ongoing clinical complexity, including persistent fungemia and bacteremia, recent HD catheter manipulation, and concerns for encephalopathy likely precipitated by polypharmacy in the setting of ESRD. Per the previous notes, upon initial evaluation on admission, the patient was significantly somnolent, requiring sternal rub for arousal. In light of this, several sedating agents, including IV Benadryl, oxycodone, and Lyrica, were held yesterday for close monitoring of her mental status. Notably, her alertness and orientation showed measurable improvement by this morning. This morning, the patient’s daughter presented to the bedside visibly distressed, expressing frustration over events that occurred last night upon concerns for AMS by the nursing staff. Specifically, she was upset that security was asked to perform a check on the patient’s room due to concerns about altered mentation and possible unprescribed substance use. Per nursing, this room check was initiated following standard policy, with supervisor approval, in response to concerns regarding ingestion of a substance not documented in the hospital record. The daughter perceived this action as accusatory and stated it contributed to a negative experience. She conveyed that the cumulative distress has led her to consider seeking transfer of care to [facility]. I met with the daughter and provided clarification regarding the rationale behind recent care decisions. I explained that medication holds were implemented temporarily for safety,  given the patient’s altered mental status yesterday. I also updated her on today’s thoracentesis status: the procedure was rescheduled for tomorrow due to limited IR staffing and inability to accommodate the patient’s request for sedation. The patient remained NPO this am in anticipation of sedation for the procedure, given her anxiety during invasive interventions. We also revisited earlier discussions about alternative anxiolytics, including PO Benadryl and hydroxyzine, both of which the patient has declined. Of note, She continues to request IV Benadryl as done during her outpatient dialysis sessions, though this approach is not medically indicated and is being avoided in accordance with psychiatry team recommendations. Of note, the psychiatry team had previously assessed the patient and determined she remains in the pre-contemplative stage regarding her use of opioids and antihistamines. She was not receptive to considering Suboxone or dose adjustments and has indicated that her outpatient provider plans to continue her current regimen. Psychiatry endorsed the medical team’s position to withhold IV Benadryl unless clinically indicated. During admission, the patient was also offered a palliative care consultation, which she declined in favor of outpatient follow-up, citing prior engagement and interest in continuing those services at home for symptom control. From an infectious disease standpoint, she remains afebrile. Recent LAURA ruled out prosthetic valve involvement; however, echodensity was noted on the HD catheter. Pt was discharged with her daughter ERIKA. AMA form was signed. Daughter states they are heading to [facility] immediately.     Visit Vitals  BP (!) 161/96   Pulse 84   Temp 36.7 °C (98.1 °F)   Resp 18     Vitals:    07/22/25 0500   Weight: 66.7 kg (147 lb 0.8 oz)         There is no immunization history on file for this patient.    Results      Pertinent Physical Exam At Time of Discharge  Physical  Exam    Home Medications     Medication List      ASK your doctor about these medications     acetaminophen 325 mg tablet; Commonly known as: Tylenol; Take 2 tablets   (650 mg) by mouth every 6 hours as needed for mild pain (1 - 3).   amLODIPine 5 mg tablet; Commonly known as: Norvasc; Take 1 tablet (5 mg)   by mouth once daily.   aspirin 81 mg EC tablet; Take 1 tablet (81 mg) by mouth once daily.   BENADRYL ORAL   carvedilol 12.5 mg tablet; Commonly known as: Coreg; Take 1 tablet (12.5   mg) by mouth 2 times a day.   cloNIDine 0.3 mg tablet; Commonly known as: Catapres; Take 1 tablet (0.3   mg) by mouth every 8 hours.   epoetin brandy-epbx 20,000 unit/mL solution; Commonly known as: Retacrit;   Inject 6,440 Units under the skin 3 times a week. Do not start before   January 17, 2024.   HYDROmorphone 2 mg tablet; Commonly known as: Dilaudid; Take 1 tablet (2   mg) by mouth every 6 hours if needed for severe pain (7 - 10).   levETIRAcetam 500 mg tablet; Commonly known as: Keppra   lidocaine 4 % patch; Place 1 patch over 12 hours on the skin once daily.   Remove & discard patch within 12 hours or as directed by MD.   methocarbamol 500 mg tablet; Commonly known as: Robaxin; Take 1 tablet   (500 mg) by mouth every 8 hours.   micafungin IV; Commonly known as: Mycamine; Infuse 100 mL (100 mg) into   a venous catheter once daily for 9 days.   oxyCODONE-acetaminophen 5-325 mg tablet; Commonly known as: Percocet   pregabalin 75 mg capsule; Commonly known as: Lyrica; Take 1 capsule (75   mg) by mouth once daily.   sevelamer carbonate 800 mg tablet; Commonly known as: Renvela; Take 1   tablet (800 mg) by mouth 3 times daily (morning, midday, late afternoon).   Swallow tablet whole; do not crush, break, or chew.   vancomycin 5 mg/mL in sodium chloride 0.9% solution; 2 mL by   intra-catheter route 3 (three) times a week.       Outpatient Follow-Up  Future Appointments   Date Time Provider Department [place]   7/23/2025  5:30 PM [facility]       Kelly Melendrez MD

## 2025-07-23 NOTE — CARE PLAN
The patient's goals for the shift include will be safe and free from falls or injury during the shift    The clinical goals for the shift include Pt will remain safe and stable during shift.    Problem: Skin  Goal: Decreased wound size/increased tissue granulation at next dressing change  Flowsheets (Taken 7/23/2025 1236)  Decreased wound size/increased tissue granulation at next dressing change:   Promote sleep for wound healing   Protective dressings over bony prominences  Goal: Participates in plan/prevention/treatment measures  Flowsheets (Taken 7/23/2025 1236)  Participates in plan/prevention/treatment measures:   Discuss with provider PT/OT consult   Elevate heels  Goal: Prevent/manage excess moisture  Flowsheets (Taken 7/23/2025 1236)  Prevent/manage excess moisture: Moisturize dry skin  Goal: Prevent/minimize sheer/friction injuries  Flowsheets (Taken 7/23/2025 1236)  Prevent/minimize sheer/friction injuries: HOB 30 degrees or less  Goal: Promote/optimize nutrition  Flowsheets (Taken 7/23/2025 1236)  Promote/optimize nutrition: Monitor/record intake including meals  Goal: Promote skin healing  Flowsheets (Taken 7/23/2025 1236)  Promote skin healing: Assess skin/pad under line(s)/device(s)

## 2025-07-24 ENCOUNTER — PATIENT OUTREACH (OUTPATIENT)
Dept: CARE COORDINATION | Facility: CLINIC | Age: 50
End: 2025-07-24
Payer: MEDICARE

## 2025-07-24 LAB
BACTERIA BLD CULT: NORMAL
BACTERIA BLD CULT: NORMAL

## 2025-07-25 ASSESSMENT — ENCOUNTER SYMPTOMS: FEVER: 1

## 2025-08-21 ENCOUNTER — PATIENT OUTREACH (OUTPATIENT)
Dept: CARE COORDINATION | Facility: CLINIC | Age: 50
End: 2025-08-21
Payer: MEDICARE

## 2025-09-05 ENCOUNTER — LAB REQUISITION (OUTPATIENT)
Dept: LAB | Facility: HOSPITAL | Age: 50
End: 2025-09-05
Payer: MEDICARE

## 2025-09-05 PROCEDURE — 87075 CULTR BACTERIA EXCEPT BLOOD: CPT

## 2025-09-05 PROCEDURE — 87040 BLOOD CULTURE FOR BACTERIA: CPT

## 2025-09-07 LAB — BACTERIA BLD CULT: NORMAL
